# Patient Record
Sex: FEMALE | Race: WHITE | Employment: OTHER | URBAN - METROPOLITAN AREA
[De-identification: names, ages, dates, MRNs, and addresses within clinical notes are randomized per-mention and may not be internally consistent; named-entity substitution may affect disease eponyms.]

---

## 2017-10-04 PROBLEM — R30.0 DYSURIA: Status: ACTIVE | Noted: 2017-10-04

## 2017-10-04 PROBLEM — R39.12 WEAK URINARY STREAM: Status: ACTIVE | Noted: 2017-10-04

## 2017-10-04 PROBLEM — R35.0 FREQUENCY OF URINATION: Status: ACTIVE | Noted: 2017-10-04

## 2017-11-08 PROBLEM — R39.12 WEAK URINARY STREAM: Status: RESOLVED | Noted: 2017-10-04 | Resolved: 2017-11-08

## 2017-11-08 PROBLEM — R30.0 DYSURIA: Status: RESOLVED | Noted: 2017-10-04 | Resolved: 2017-11-08

## 2018-04-30 ENCOUNTER — APPOINTMENT (OUTPATIENT)
Dept: GENERAL RADIOLOGY | Age: 73
DRG: 872 | End: 2018-04-30
Payer: MEDICARE

## 2018-04-30 ENCOUNTER — HOSPITAL ENCOUNTER (INPATIENT)
Age: 73
LOS: 4 days | Discharge: NURSING, REHAB OR LONG-TERM CARE | DRG: 872 | End: 2018-05-04
Attending: EMERGENCY MEDICINE | Admitting: FAMILY MEDICINE
Payer: MEDICARE

## 2018-04-30 ENCOUNTER — APPOINTMENT (OUTPATIENT)
Dept: CT IMAGING | Age: 73
DRG: 872 | End: 2018-04-30
Payer: MEDICARE

## 2018-04-30 DIAGNOSIS — A41.9 SEVERE SEPSIS (HCC): Primary | ICD-10-CM

## 2018-04-30 DIAGNOSIS — E86.0 DEHYDRATION: ICD-10-CM

## 2018-04-30 DIAGNOSIS — J18.9 PNEUMONIA DUE TO ORGANISM: ICD-10-CM

## 2018-04-30 DIAGNOSIS — R65.20 SEVERE SEPSIS (HCC): Primary | ICD-10-CM

## 2018-04-30 LAB
-: ABNORMAL
ABSOLUTE EOS #: 0 K/UL (ref 0–0.4)
ABSOLUTE IMMATURE GRANULOCYTE: ABNORMAL K/UL (ref 0–0.3)
ABSOLUTE LYMPH #: 0.54 K/UL (ref 1–4.8)
ABSOLUTE MONO #: 0.91 K/UL (ref 0.2–0.8)
ACETAMINOPHEN LEVEL: <10 UG/ML (ref 10–30)
AMORPHOUS: ABNORMAL
AMPHETAMINE SCREEN URINE: NEGATIVE
ANION GAP SERPL CALCULATED.3IONS-SCNC: 14 MMOL/L (ref 9–17)
BACTERIA: ABNORMAL
BARBITURATE SCREEN URINE: NEGATIVE
BASOPHILS # BLD: 0 %
BASOPHILS ABSOLUTE: 0 K/UL (ref 0–0.2)
BENZODIAZEPINE SCREEN, URINE: POSITIVE
BILIRUBIN URINE: NEGATIVE
BNP INTERPRETATION: NORMAL
BUN BLDV-MCNC: 17 MG/DL (ref 8–23)
BUN/CREAT BLD: 9 (ref 9–20)
BUPRENORPHINE URINE: ABNORMAL
CALCIUM SERPL-MCNC: 9.5 MG/DL (ref 8.6–10.4)
CANNABINOID SCREEN URINE: NEGATIVE
CASTS UA: ABNORMAL /LPF
CHLORIDE BLD-SCNC: 102 MMOL/L (ref 98–107)
CO2: 25 MMOL/L (ref 20–31)
COCAINE METABOLITE, URINE: NEGATIVE
COLOR: YELLOW
COMMENT UA: ABNORMAL
CREAT SERPL-MCNC: 1.99 MG/DL (ref 0.5–0.9)
CRYSTALS, UA: ABNORMAL /HPF
DIFFERENTIAL TYPE: ABNORMAL
EKG ATRIAL RATE: 73 BPM
EKG P AXIS: 37 DEGREES
EKG P-R INTERVAL: 152 MS
EKG Q-T INTERVAL: 404 MS
EKG QRS DURATION: 82 MS
EKG QTC CALCULATION (BAZETT): 445 MS
EKG R AXIS: 41 DEGREES
EKG T AXIS: 13 DEGREES
EKG VENTRICULAR RATE: 73 BPM
EOSINOPHILS RELATIVE PERCENT: 0 % (ref 1–4)
EPITHELIAL CELLS UA: ABNORMAL /HPF (ref 0–5)
ETHANOL PERCENT: <0.01 %
ETHANOL: <10 MG/DL
GFR AFRICAN AMERICAN: 30 ML/MIN
GFR NON-AFRICAN AMERICAN: 25 ML/MIN
GFR SERPL CREATININE-BSD FRML MDRD: ABNORMAL ML/MIN/{1.73_M2}
GFR SERPL CREATININE-BSD FRML MDRD: ABNORMAL ML/MIN/{1.73_M2}
GLUCOSE BLD-MCNC: 137 MG/DL (ref 70–99)
GLUCOSE URINE: NEGATIVE
HCT VFR BLD CALC: 46.9 % (ref 36–46)
HEMOGLOBIN: 15.4 G/DL (ref 12–16)
IMMATURE GRANULOCYTES: ABNORMAL %
KETONES, URINE: NEGATIVE
LACTIC ACID, SEPSIS WHOLE BLOOD: ABNORMAL MMOL/L (ref 0.5–1.9)
LACTIC ACID, SEPSIS WHOLE BLOOD: NORMAL MMOL/L (ref 0.5–1.9)
LACTIC ACID, SEPSIS WHOLE BLOOD: NORMAL MMOL/L (ref 0.5–1.9)
LACTIC ACID, SEPSIS: 1.7 MMOL/L (ref 0.5–1.9)
LACTIC ACID, SEPSIS: 1.8 MMOL/L (ref 0.5–1.9)
LACTIC ACID, SEPSIS: 2 MMOL/L (ref 0.5–1.9)
LACTIC ACID: 4.1 MMOL/L (ref 0.5–2.2)
LEUKOCYTE ESTERASE, URINE: NEGATIVE
LYMPHOCYTES # BLD: 3 % (ref 24–44)
MCH RBC QN AUTO: 30.8 PG (ref 26–34)
MCHC RBC AUTO-ENTMCNC: 32.9 G/DL (ref 31–37)
MCV RBC AUTO: 93.5 FL (ref 80–100)
MDMA URINE: ABNORMAL
METHADONE SCREEN, URINE: NEGATIVE
METHAMPHETAMINE, URINE: ABNORMAL
MONOCYTES # BLD: 5 % (ref 1–7)
MUCUS: ABNORMAL
MYOGLOBIN: 675 NG/ML (ref 25–58)
NITRITE, URINE: NEGATIVE
NRBC AUTOMATED: ABNORMAL PER 100 WBC
OPIATES, URINE: NEGATIVE
OTHER OBSERVATIONS UA: ABNORMAL
OXYCODONE SCREEN URINE: POSITIVE
PDW BLD-RTO: 14.2 % (ref 11.5–14.5)
PH UA: 5.5 (ref 5–8)
PHENCYCLIDINE, URINE: NEGATIVE
PLATELET # BLD: 132 K/UL (ref 130–400)
PLATELET ESTIMATE: ABNORMAL
PMV BLD AUTO: 11.3 FL (ref 6–12)
POTASSIUM SERPL-SCNC: 4.6 MMOL/L (ref 3.7–5.3)
PRO-BNP: 263 PG/ML
PROPOXYPHENE, URINE: ABNORMAL
PROTEIN UA: NEGATIVE
RBC # BLD: 5.02 M/UL (ref 4–5.2)
RBC # BLD: ABNORMAL 10*6/UL
RBC UA: ABNORMAL /HPF (ref 0–2)
RENAL EPITHELIAL, UA: ABNORMAL /HPF
SALICYLATE LEVEL: <1 MG/DL (ref 3–10)
SEG NEUTROPHILS: 92 % (ref 36–66)
SEGMENTED NEUTROPHILS ABSOLUTE COUNT: 16.65 K/UL (ref 1.8–7.7)
SODIUM BLD-SCNC: 141 MMOL/L (ref 135–144)
SPECIFIC GRAVITY UA: 1.01 (ref 1–1.03)
TEST INFORMATION: ABNORMAL
TOTAL CK: 233 U/L (ref 26–192)
TOXIC TRICYCLIC SC,BLOOD: NEGATIVE
TRICHOMONAS: ABNORMAL
TRICYCLIC ANTIDEPRESSANTS, UR: ABNORMAL
TROPONIN INTERP: ABNORMAL
TROPONIN T: <0.03 NG/ML
TURBIDITY: ABNORMAL
URINE HGB: ABNORMAL
UROBILINOGEN, URINE: NORMAL
WBC # BLD: 18.1 K/UL (ref 3.5–11)
WBC # BLD: ABNORMAL 10*3/UL
WBC UA: ABNORMAL /HPF (ref 0–5)
YEAST: ABNORMAL

## 2018-04-30 PROCEDURE — 2580000003 HC RX 258: Performed by: PHYSICIAN ASSISTANT

## 2018-04-30 PROCEDURE — 73610 X-RAY EXAM OF ANKLE: CPT

## 2018-04-30 PROCEDURE — 73502 X-RAY EXAM HIP UNI 2-3 VIEWS: CPT

## 2018-04-30 PROCEDURE — 73630 X-RAY EXAM OF FOOT: CPT

## 2018-04-30 PROCEDURE — 83880 ASSAY OF NATRIURETIC PEPTIDE: CPT

## 2018-04-30 PROCEDURE — 70450 CT HEAD/BRAIN W/O DYE: CPT

## 2018-04-30 PROCEDURE — 6360000002 HC RX W HCPCS: Performed by: PHYSICIAN ASSISTANT

## 2018-04-30 PROCEDURE — 93005 ELECTROCARDIOGRAM TRACING: CPT

## 2018-04-30 PROCEDURE — 71046 X-RAY EXAM CHEST 2 VIEWS: CPT

## 2018-04-30 PROCEDURE — G0480 DRUG TEST DEF 1-7 CLASSES: HCPCS

## 2018-04-30 PROCEDURE — 87040 BLOOD CULTURE FOR BACTERIA: CPT

## 2018-04-30 PROCEDURE — 36415 COLL VENOUS BLD VENIPUNCTURE: CPT

## 2018-04-30 PROCEDURE — 72125 CT NECK SPINE W/O DYE: CPT

## 2018-04-30 PROCEDURE — 2000000000 HC ICU R&B

## 2018-04-30 PROCEDURE — 96374 THER/PROPH/DIAG INJ IV PUSH: CPT

## 2018-04-30 PROCEDURE — 83605 ASSAY OF LACTIC ACID: CPT

## 2018-04-30 PROCEDURE — 84484 ASSAY OF TROPONIN QUANT: CPT

## 2018-04-30 PROCEDURE — 99285 EMERGENCY DEPT VISIT HI MDM: CPT

## 2018-04-30 PROCEDURE — 96361 HYDRATE IV INFUSION ADD-ON: CPT

## 2018-04-30 PROCEDURE — 87205 SMEAR GRAM STAIN: CPT

## 2018-04-30 PROCEDURE — 73562 X-RAY EXAM OF KNEE 3: CPT

## 2018-04-30 PROCEDURE — 80048 BASIC METABOLIC PNL TOTAL CA: CPT

## 2018-04-30 PROCEDURE — 85025 COMPLETE CBC W/AUTO DIFF WBC: CPT

## 2018-04-30 PROCEDURE — 2580000003 HC RX 258: Performed by: INTERNAL MEDICINE

## 2018-04-30 PROCEDURE — 81001 URINALYSIS AUTO W/SCOPE: CPT

## 2018-04-30 PROCEDURE — 83874 ASSAY OF MYOGLOBIN: CPT

## 2018-04-30 PROCEDURE — 80307 DRUG TEST PRSMV CHEM ANLYZR: CPT

## 2018-04-30 PROCEDURE — 82550 ASSAY OF CK (CPK): CPT

## 2018-04-30 RX ORDER — 0.9 % SODIUM CHLORIDE 0.9 %
1100 INTRAVENOUS SOLUTION INTRAVENOUS ONCE
Status: COMPLETED | OUTPATIENT
Start: 2018-04-30 | End: 2018-04-30

## 2018-04-30 RX ORDER — SODIUM CHLORIDE 0.9 % (FLUSH) 0.9 %
10 SYRINGE (ML) INJECTION EVERY 12 HOURS SCHEDULED
Status: DISCONTINUED | OUTPATIENT
Start: 2018-04-30 | End: 2018-05-04 | Stop reason: HOSPADM

## 2018-04-30 RX ORDER — SODIUM CHLORIDE 9 MG/ML
INJECTION, SOLUTION INTRAVENOUS CONTINUOUS
Status: DISCONTINUED | OUTPATIENT
Start: 2018-04-30 | End: 2018-05-04

## 2018-04-30 RX ORDER — PANTOPRAZOLE SODIUM 40 MG/1
40 TABLET, DELAYED RELEASE ORAL DAILY
Refills: 0 | Status: ON HOLD | COMMUNITY
Start: 2018-03-10 | End: 2020-02-25 | Stop reason: HOSPADM

## 2018-04-30 RX ORDER — ONDANSETRON 2 MG/ML
4 INJECTION INTRAMUSCULAR; INTRAVENOUS EVERY 6 HOURS PRN
Status: DISCONTINUED | OUTPATIENT
Start: 2018-04-30 | End: 2018-05-04 | Stop reason: HOSPADM

## 2018-04-30 RX ORDER — SIMVASTATIN 40 MG
40 TABLET ORAL NIGHTLY
Status: DISCONTINUED | OUTPATIENT
Start: 2018-04-30 | End: 2018-04-30

## 2018-04-30 RX ORDER — BENZTROPINE MESYLATE 2 MG/1
2 TABLET ORAL DAILY
Status: ON HOLD | COMMUNITY
End: 2018-05-04 | Stop reason: HOSPADM

## 2018-04-30 RX ORDER — SODIUM CHLORIDE 0.9 % (FLUSH) 0.9 %
10 SYRINGE (ML) INJECTION EVERY 12 HOURS SCHEDULED
Status: DISCONTINUED | OUTPATIENT
Start: 2018-04-30 | End: 2018-04-30 | Stop reason: SDUPTHER

## 2018-04-30 RX ORDER — SODIUM CHLORIDE 0.9 % (FLUSH) 0.9 %
10 SYRINGE (ML) INJECTION PRN
Status: DISCONTINUED | OUTPATIENT
Start: 2018-04-30 | End: 2018-05-04 | Stop reason: HOSPADM

## 2018-04-30 RX ORDER — SODIUM CHLORIDE 0.9 % (FLUSH) 0.9 %
10 SYRINGE (ML) INJECTION PRN
Status: DISCONTINUED | OUTPATIENT
Start: 2018-04-30 | End: 2018-04-30 | Stop reason: SDUPTHER

## 2018-04-30 RX ORDER — ALPRAZOLAM 1 MG/1
1 TABLET ORAL 3 TIMES DAILY PRN
Status: ON HOLD | COMMUNITY
End: 2018-05-04 | Stop reason: HOSPADM

## 2018-04-30 RX ORDER — BISACODYL 10 MG
10 SUPPOSITORY, RECTAL RECTAL DAILY PRN
Status: DISCONTINUED | OUTPATIENT
Start: 2018-04-30 | End: 2018-05-04 | Stop reason: HOSPADM

## 2018-04-30 RX ORDER — ONDANSETRON 2 MG/ML
4 INJECTION INTRAMUSCULAR; INTRAVENOUS EVERY 6 HOURS PRN
Status: DISCONTINUED | OUTPATIENT
Start: 2018-04-30 | End: 2018-04-30 | Stop reason: SDUPTHER

## 2018-04-30 RX ORDER — CITALOPRAM 20 MG/1
20 TABLET ORAL DAILY
Status: DISCONTINUED | OUTPATIENT
Start: 2018-04-30 | End: 2018-05-04 | Stop reason: HOSPADM

## 2018-04-30 RX ORDER — LEVOFLOXACIN 5 MG/ML
500 INJECTION, SOLUTION INTRAVENOUS EVERY 24 HOURS
Status: DISCONTINUED | OUTPATIENT
Start: 2018-05-01 | End: 2018-05-03

## 2018-04-30 RX ORDER — CALCIUM CARBONATE 200(500)MG
500 TABLET,CHEWABLE ORAL 3 TIMES DAILY PRN
Status: DISCONTINUED | OUTPATIENT
Start: 2018-04-30 | End: 2018-05-04 | Stop reason: HOSPADM

## 2018-04-30 RX ORDER — NICOTINE 21 MG/24HR
1 PATCH, TRANSDERMAL 24 HOURS TRANSDERMAL DAILY PRN
Status: DISCONTINUED | OUTPATIENT
Start: 2018-04-30 | End: 2018-05-04 | Stop reason: HOSPADM

## 2018-04-30 RX ORDER — 0.9 % SODIUM CHLORIDE 0.9 %
1000 INTRAVENOUS SOLUTION INTRAVENOUS ONCE
Status: COMPLETED | OUTPATIENT
Start: 2018-04-30 | End: 2018-04-30

## 2018-04-30 RX ORDER — QUETIAPINE FUMARATE 100 MG/1
100 TABLET, FILM COATED ORAL 2 TIMES DAILY
Status: ON HOLD | COMMUNITY
End: 2018-05-04 | Stop reason: HOSPADM

## 2018-04-30 RX ORDER — SIMVASTATIN 40 MG
40 TABLET ORAL DAILY
Status: DISCONTINUED | OUTPATIENT
Start: 2018-05-01 | End: 2018-05-04 | Stop reason: HOSPADM

## 2018-04-30 RX ORDER — LEVOFLOXACIN 5 MG/ML
750 INJECTION, SOLUTION INTRAVENOUS ONCE
Status: COMPLETED | OUTPATIENT
Start: 2018-04-30 | End: 2018-04-30

## 2018-04-30 RX ORDER — SODIUM CHLORIDE 9 MG/ML
INJECTION, SOLUTION INTRAVENOUS CONTINUOUS
Status: DISCONTINUED | OUTPATIENT
Start: 2018-04-30 | End: 2018-04-30

## 2018-04-30 RX ORDER — MAGNESIUM SULFATE 1 G/100ML
1 INJECTION INTRAVENOUS PRN
Status: DISCONTINUED | OUTPATIENT
Start: 2018-04-30 | End: 2018-04-30 | Stop reason: SDUPTHER

## 2018-04-30 RX ORDER — CEFTRIAXONE 1 G/1
1 INJECTION, POWDER, FOR SOLUTION INTRAMUSCULAR; INTRAVENOUS EVERY 24 HOURS
Status: DISCONTINUED | OUTPATIENT
Start: 2018-05-01 | End: 2018-04-30 | Stop reason: SDUPTHER

## 2018-04-30 RX ORDER — TRAZODONE HYDROCHLORIDE 50 MG/1
75 TABLET ORAL NIGHTLY
Status: DISCONTINUED | OUTPATIENT
Start: 2018-04-30 | End: 2018-05-04 | Stop reason: HOSPADM

## 2018-04-30 RX ORDER — LEVOFLOXACIN 5 MG/ML
500 INJECTION, SOLUTION INTRAVENOUS ONCE
Status: DISCONTINUED | OUTPATIENT
Start: 2018-04-30 | End: 2018-04-30

## 2018-04-30 RX ORDER — ONDANSETRON 4 MG/1
4 TABLET, ORALLY DISINTEGRATING ORAL EVERY 6 HOURS PRN
Status: DISCONTINUED | OUTPATIENT
Start: 2018-04-30 | End: 2018-05-04 | Stop reason: HOSPADM

## 2018-04-30 RX ADMIN — SODIUM CHLORIDE 1000 ML: 9 INJECTION, SOLUTION INTRAVENOUS at 17:30

## 2018-04-30 RX ADMIN — LEVOFLOXACIN 750 MG: 5 INJECTION, SOLUTION INTRAVENOUS at 17:52

## 2018-04-30 RX ADMIN — Medication 10 ML: at 22:31

## 2018-04-30 RX ADMIN — CEFTRIAXONE SODIUM 1 G: 1 INJECTION, POWDER, FOR SOLUTION INTRAMUSCULAR; INTRAVENOUS at 17:47

## 2018-04-30 RX ADMIN — SODIUM CHLORIDE 1000 ML: 9 INJECTION, SOLUTION INTRAVENOUS at 15:38

## 2018-04-30 RX ADMIN — SODIUM CHLORIDE: 9 INJECTION, SOLUTION INTRAVENOUS at 22:26

## 2018-04-30 ASSESSMENT — PAIN SCALES - GENERAL
PAINLEVEL_OUTOF10: 0

## 2018-05-01 ENCOUNTER — APPOINTMENT (OUTPATIENT)
Dept: GENERAL RADIOLOGY | Age: 73
DRG: 872 | End: 2018-05-01
Payer: MEDICARE

## 2018-05-01 PROBLEM — R33.9 URINE RETENTION: Status: ACTIVE | Noted: 2018-05-01
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
## 2018-05-01 PROBLEM — N17.9 ACUTE KIDNEY INJURY (HCC): Status: ACTIVE | Noted: 2018-05-01

## 2018-05-01 LAB
ANION GAP SERPL CALCULATED.3IONS-SCNC: 9 MMOL/L (ref 9–17)
BUN BLDV-MCNC: 20 MG/DL (ref 8–23)
BUN/CREAT BLD: 20 (ref 9–20)
CALCIUM SERPL-MCNC: 8.7 MG/DL (ref 8.6–10.4)
CHLORIDE BLD-SCNC: 109 MMOL/L (ref 98–107)
CO2: 25 MMOL/L (ref 20–31)
CREAT SERPL-MCNC: 0.99 MG/DL (ref 0.5–0.9)
GFR AFRICAN AMERICAN: >60 ML/MIN
GFR NON-AFRICAN AMERICAN: 55 ML/MIN
GFR SERPL CREATININE-BSD FRML MDRD: ABNORMAL ML/MIN/{1.73_M2}
GFR SERPL CREATININE-BSD FRML MDRD: ABNORMAL ML/MIN/{1.73_M2}
GLUCOSE BLD-MCNC: 92 MG/DL (ref 70–99)
HCT VFR BLD CALC: 41.1 % (ref 36–46)
HEMOGLOBIN: 13.5 G/DL (ref 12–16)
INR BLD: 1.1
MCH RBC QN AUTO: 30.5 PG (ref 26–34)
MCHC RBC AUTO-ENTMCNC: 32.8 G/DL (ref 31–37)
MCV RBC AUTO: 92.8 FL (ref 80–100)
NRBC AUTOMATED: ABNORMAL PER 100 WBC
PDW BLD-RTO: 14.2 % (ref 11.5–14.5)
PLATELET # BLD: 121 K/UL (ref 130–400)
PMV BLD AUTO: 11.4 FL (ref 6–12)
POTASSIUM SERPL-SCNC: 4 MMOL/L (ref 3.7–5.3)
PROTHROMBIN TIME: 11.4 SEC (ref 9.7–11.6)
RBC # BLD: 4.43 M/UL (ref 4–5.2)
SODIUM BLD-SCNC: 143 MMOL/L (ref 135–144)
WBC # BLD: 13 K/UL (ref 3.5–11)

## 2018-05-01 PROCEDURE — 1200000000 HC SEMI PRIVATE

## 2018-05-01 PROCEDURE — 6370000000 HC RX 637 (ALT 250 FOR IP): Performed by: INTERNAL MEDICINE

## 2018-05-01 PROCEDURE — 97530 THERAPEUTIC ACTIVITIES: CPT

## 2018-05-01 PROCEDURE — 6370000000 HC RX 637 (ALT 250 FOR IP): Performed by: NURSE PRACTITIONER

## 2018-05-01 PROCEDURE — 6360000002 HC RX W HCPCS: Performed by: INTERNAL MEDICINE

## 2018-05-01 PROCEDURE — 85610 PROTHROMBIN TIME: CPT

## 2018-05-01 PROCEDURE — 2580000003 HC RX 258: Performed by: PHYSICIAN ASSISTANT

## 2018-05-01 PROCEDURE — 2580000003 HC RX 258: Performed by: INTERNAL MEDICINE

## 2018-05-01 PROCEDURE — 51798 US URINE CAPACITY MEASURE: CPT

## 2018-05-01 PROCEDURE — 80048 BASIC METABOLIC PNL TOTAL CA: CPT

## 2018-05-01 PROCEDURE — 85027 COMPLETE CBC AUTOMATED: CPT

## 2018-05-01 PROCEDURE — 87086 URINE CULTURE/COLONY COUNT: CPT

## 2018-05-01 PROCEDURE — 51702 INSERT TEMP BLADDER CATH: CPT

## 2018-05-01 PROCEDURE — 6360000002 HC RX W HCPCS: Performed by: NURSE PRACTITIONER

## 2018-05-01 PROCEDURE — 72100 X-RAY EXAM L-S SPINE 2/3 VWS: CPT

## 2018-05-01 PROCEDURE — 2580000003 HC RX 258: Performed by: NURSE PRACTITIONER

## 2018-05-01 PROCEDURE — 36415 COLL VENOUS BLD VENIPUNCTURE: CPT

## 2018-05-01 PROCEDURE — 97116 GAIT TRAINING THERAPY: CPT

## 2018-05-01 PROCEDURE — 97162 PT EVAL MOD COMPLEX 30 MIN: CPT

## 2018-05-01 PROCEDURE — 99222 1ST HOSP IP/OBS MODERATE 55: CPT | Performed by: INTERNAL MEDICINE

## 2018-05-01 PROCEDURE — G8979 MOBILITY GOAL STATUS: HCPCS

## 2018-05-01 PROCEDURE — G8978 MOBILITY CURRENT STATUS: HCPCS

## 2018-05-01 RX ORDER — QUETIAPINE FUMARATE 100 MG/1
100 TABLET, FILM COATED ORAL 2 TIMES DAILY
Status: DISCONTINUED | OUTPATIENT
Start: 2018-05-01 | End: 2018-05-04 | Stop reason: HOSPADM

## 2018-05-01 RX ORDER — BENZTROPINE MESYLATE 1 MG/1
2 TABLET ORAL DAILY
Status: DISCONTINUED | OUTPATIENT
Start: 2018-05-01 | End: 2018-05-04 | Stop reason: HOSPADM

## 2018-05-01 RX ORDER — METOPROLOL SUCCINATE 25 MG/1
25 TABLET, EXTENDED RELEASE ORAL DAILY
Status: DISCONTINUED | OUTPATIENT
Start: 2018-05-01 | End: 2018-05-04 | Stop reason: HOSPADM

## 2018-05-01 RX ORDER — ALPRAZOLAM 0.5 MG/1
0.5 TABLET ORAL 3 TIMES DAILY PRN
Status: DISCONTINUED | OUTPATIENT
Start: 2018-05-01 | End: 2018-05-04 | Stop reason: HOSPADM

## 2018-05-01 RX ORDER — OXYCODONE HYDROCHLORIDE AND ACETAMINOPHEN 5; 325 MG/1; MG/1
1 TABLET ORAL EVERY 4 HOURS PRN
Status: DISCONTINUED | OUTPATIENT
Start: 2018-05-01 | End: 2018-05-02

## 2018-05-01 RX ORDER — FAMOTIDINE 20 MG/1
20 TABLET, FILM COATED ORAL DAILY
Status: DISCONTINUED | OUTPATIENT
Start: 2018-05-01 | End: 2018-05-02

## 2018-05-01 RX ORDER — GABAPENTIN 300 MG/1
300 CAPSULE ORAL 3 TIMES DAILY
Status: DISCONTINUED | OUTPATIENT
Start: 2018-05-01 | End: 2018-05-04 | Stop reason: HOSPADM

## 2018-05-01 RX ORDER — CHLORDIAZEPOXIDE HYDROCHLORIDE 5 MG/1
5 CAPSULE, GELATIN COATED ORAL 3 TIMES DAILY
Status: DISCONTINUED | OUTPATIENT
Start: 2018-05-01 | End: 2018-05-04 | Stop reason: HOSPADM

## 2018-05-01 RX ORDER — MELOXICAM 7.5 MG/1
7.5 TABLET ORAL DAILY
Status: DISCONTINUED | OUTPATIENT
Start: 2018-05-01 | End: 2018-05-04 | Stop reason: HOSPADM

## 2018-05-01 RX ADMIN — CHLORDIAZEPOXIDE HYDROCHLORIDE 5 MG: 5 CAPSULE, GELATIN COATED ORAL at 20:48

## 2018-05-01 RX ADMIN — ONDANSETRON 4 MG: 2 INJECTION INTRAMUSCULAR; INTRAVENOUS at 06:42

## 2018-05-01 RX ADMIN — Medication 10 ML: at 08:03

## 2018-05-01 RX ADMIN — MELOXICAM 7.5 MG: 7.5 TABLET ORAL at 11:46

## 2018-05-01 RX ADMIN — FAMOTIDINE 20 MG: 20 TABLET ORAL at 11:51

## 2018-05-01 RX ADMIN — BENZTROPINE MESYLATE 2 MG: 1 TABLET ORAL at 11:46

## 2018-05-01 RX ADMIN — QUETIAPINE FUMARATE 100 MG: 100 TABLET ORAL at 11:46

## 2018-05-01 RX ADMIN — ANTACID TABLETS 500 MG: 500 TABLET, CHEWABLE ORAL at 18:31

## 2018-05-01 RX ADMIN — CHLORDIAZEPOXIDE HYDROCHLORIDE 5 MG: 5 CAPSULE, GELATIN COATED ORAL at 13:55

## 2018-05-01 RX ADMIN — GABAPENTIN 300 MG: 300 CAPSULE ORAL at 20:44

## 2018-05-01 RX ADMIN — SIMVASTATIN 40 MG: 40 TABLET, FILM COATED ORAL at 08:03

## 2018-05-01 RX ADMIN — CEFTRIAXONE SODIUM 1 G: 1 INJECTION, POWDER, FOR SOLUTION INTRAMUSCULAR; INTRAVENOUS at 18:32

## 2018-05-01 RX ADMIN — ALPRAZOLAM 0.5 MG: 0.5 TABLET ORAL at 07:26

## 2018-05-01 RX ADMIN — SODIUM CHLORIDE: 9 INJECTION, SOLUTION INTRAVENOUS at 11:51

## 2018-05-01 RX ADMIN — ANTACID TABLETS 500 MG: 500 TABLET, CHEWABLE ORAL at 06:39

## 2018-05-01 RX ADMIN — QUETIAPINE FUMARATE 100 MG: 100 TABLET ORAL at 20:44

## 2018-05-01 RX ADMIN — TRAZODONE HYDROCHLORIDE 75 MG: 50 TABLET ORAL at 20:44

## 2018-05-01 RX ADMIN — OXYCODONE HYDROCHLORIDE AND ACETAMINOPHEN 1 TABLET: 5; 325 TABLET ORAL at 11:46

## 2018-05-01 RX ADMIN — METOPROLOL SUCCINATE 25 MG: 25 TABLET, FILM COATED, EXTENDED RELEASE ORAL at 11:47

## 2018-05-01 RX ADMIN — CITALOPRAM HYDROBROMIDE 20 MG: 20 TABLET ORAL at 08:03

## 2018-05-01 RX ADMIN — GABAPENTIN 300 MG: 300 CAPSULE ORAL at 13:55

## 2018-05-01 RX ADMIN — ANTACID TABLETS 500 MG: 500 TABLET, CHEWABLE ORAL at 00:11

## 2018-05-01 RX ADMIN — LEVOFLOXACIN 500 MG: 5 INJECTION, SOLUTION INTRAVENOUS at 20:21

## 2018-05-01 RX ADMIN — SODIUM CHLORIDE: 9 INJECTION, SOLUTION INTRAVENOUS at 05:03

## 2018-05-01 RX ADMIN — TRAZODONE HYDROCHLORIDE 75 MG: 50 TABLET ORAL at 00:11

## 2018-05-01 RX ADMIN — OXYCODONE HYDROCHLORIDE AND ACETAMINOPHEN 1 TABLET: 5; 325 TABLET ORAL at 15:45

## 2018-05-01 RX ADMIN — ALPRAZOLAM 0.5 MG: 0.5 TABLET ORAL at 15:45

## 2018-05-01 ASSESSMENT — PAIN SCALES - GENERAL
PAINLEVEL_OUTOF10: 6
PAINLEVEL_OUTOF10: 0
PAINLEVEL_OUTOF10: 8
PAINLEVEL_OUTOF10: 0
PAINLEVEL_OUTOF10: 8
PAINLEVEL_OUTOF10: 0

## 2018-05-01 ASSESSMENT — PAIN DESCRIPTION - LOCATION
LOCATION: LEG;FOOT;HIP
LOCATION: KNEE

## 2018-05-01 ASSESSMENT — PAIN DESCRIPTION - DESCRIPTORS: DESCRIPTORS: THROBBING

## 2018-05-01 ASSESSMENT — PAIN DESCRIPTION - ORIENTATION
ORIENTATION: LEFT

## 2018-05-01 ASSESSMENT — PAIN DESCRIPTION - PAIN TYPE: TYPE: ACUTE PAIN

## 2018-05-02 ENCOUNTER — APPOINTMENT (OUTPATIENT)
Dept: GENERAL RADIOLOGY | Age: 73
DRG: 872 | End: 2018-05-02
Payer: MEDICARE

## 2018-05-02 LAB
ANION GAP SERPL CALCULATED.3IONS-SCNC: 11 MMOL/L (ref 9–17)
BUN BLDV-MCNC: 12 MG/DL (ref 8–23)
BUN/CREAT BLD: 14 (ref 9–20)
CALCIUM SERPL-MCNC: 8.2 MG/DL (ref 8.6–10.4)
CHLORIDE BLD-SCNC: 108 MMOL/L (ref 98–107)
CO2: 24 MMOL/L (ref 20–31)
CREAT SERPL-MCNC: 0.88 MG/DL (ref 0.5–0.9)
CULTURE: ABNORMAL
CULTURE: NO GROWTH
CULTURE: NORMAL
GFR AFRICAN AMERICAN: >60 ML/MIN
GFR NON-AFRICAN AMERICAN: >60 ML/MIN
GFR SERPL CREATININE-BSD FRML MDRD: ABNORMAL ML/MIN/{1.73_M2}
GFR SERPL CREATININE-BSD FRML MDRD: ABNORMAL ML/MIN/{1.73_M2}
GLUCOSE BLD-MCNC: 86 MG/DL (ref 70–99)
HCT VFR BLD CALC: 39.5 % (ref 36–46)
HEMOGLOBIN: 12.7 G/DL (ref 12–16)
Lab: ABNORMAL
Lab: NORMAL
MCH RBC QN AUTO: 30.2 PG (ref 26–34)
MCHC RBC AUTO-ENTMCNC: 32.2 G/DL (ref 31–37)
MCV RBC AUTO: 93.7 FL (ref 80–100)
NRBC AUTOMATED: ABNORMAL PER 100 WBC
PDW BLD-RTO: 14.3 % (ref 11.5–14.5)
PLATELET # BLD: 116 K/UL (ref 130–400)
PMV BLD AUTO: 11.6 FL (ref 6–12)
POTASSIUM SERPL-SCNC: 4 MMOL/L (ref 3.7–5.3)
RBC # BLD: 4.22 M/UL (ref 4–5.2)
SODIUM BLD-SCNC: 143 MMOL/L (ref 135–144)
SPECIMEN DESCRIPTION: ABNORMAL
SPECIMEN DESCRIPTION: ABNORMAL
SPECIMEN DESCRIPTION: NORMAL
SPECIMEN DESCRIPTION: NORMAL
STATUS: ABNORMAL
STATUS: NORMAL
WBC # BLD: 8.4 K/UL (ref 3.5–11)

## 2018-05-02 PROCEDURE — 85027 COMPLETE CBC AUTOMATED: CPT

## 2018-05-02 PROCEDURE — 97530 THERAPEUTIC ACTIVITIES: CPT

## 2018-05-02 PROCEDURE — 36415 COLL VENOUS BLD VENIPUNCTURE: CPT

## 2018-05-02 PROCEDURE — 6360000002 HC RX W HCPCS: Performed by: INTERNAL MEDICINE

## 2018-05-02 PROCEDURE — 6370000000 HC RX 637 (ALT 250 FOR IP): Performed by: NURSE PRACTITIONER

## 2018-05-02 PROCEDURE — 71046 X-RAY EXAM CHEST 2 VIEWS: CPT

## 2018-05-02 PROCEDURE — 1200000000 HC SEMI PRIVATE

## 2018-05-02 PROCEDURE — 97110 THERAPEUTIC EXERCISES: CPT

## 2018-05-02 PROCEDURE — 6370000000 HC RX 637 (ALT 250 FOR IP): Performed by: INTERNAL MEDICINE

## 2018-05-02 PROCEDURE — 2580000003 HC RX 258: Performed by: NURSE PRACTITIONER

## 2018-05-02 PROCEDURE — 6370000000 HC RX 637 (ALT 250 FOR IP): Performed by: FAMILY MEDICINE

## 2018-05-02 PROCEDURE — 97116 GAIT TRAINING THERAPY: CPT

## 2018-05-02 PROCEDURE — 80048 BASIC METABOLIC PNL TOTAL CA: CPT

## 2018-05-02 PROCEDURE — 6360000002 HC RX W HCPCS: Performed by: NURSE PRACTITIONER

## 2018-05-02 PROCEDURE — 2580000003 HC RX 258: Performed by: INTERNAL MEDICINE

## 2018-05-02 PROCEDURE — 2580000003 HC RX 258: Performed by: PHYSICIAN ASSISTANT

## 2018-05-02 RX ORDER — OXYCODONE HYDROCHLORIDE AND ACETAMINOPHEN 5; 325 MG/1; MG/1
1 TABLET ORAL EVERY 4 HOURS PRN
Status: DISCONTINUED | OUTPATIENT
Start: 2018-05-02 | End: 2018-05-04 | Stop reason: HOSPADM

## 2018-05-02 RX ORDER — FAMOTIDINE 20 MG/1
40 TABLET, FILM COATED ORAL DAILY
Status: DISCONTINUED | OUTPATIENT
Start: 2018-05-03 | End: 2018-05-04 | Stop reason: HOSPADM

## 2018-05-02 RX ORDER — ACETAMINOPHEN 325 MG/1
650 TABLET ORAL EVERY 4 HOURS PRN
Status: DISCONTINUED | OUTPATIENT
Start: 2018-05-02 | End: 2018-05-04 | Stop reason: HOSPADM

## 2018-05-02 RX ADMIN — OXYCODONE HYDROCHLORIDE AND ACETAMINOPHEN 1 TABLET: 5; 325 TABLET ORAL at 19:00

## 2018-05-02 RX ADMIN — SODIUM CHLORIDE: 9 INJECTION, SOLUTION INTRAVENOUS at 02:13

## 2018-05-02 RX ADMIN — CHLORDIAZEPOXIDE HYDROCHLORIDE 5 MG: 5 CAPSULE, GELATIN COATED ORAL at 14:10

## 2018-05-02 RX ADMIN — OXYCODONE HYDROCHLORIDE AND ACETAMINOPHEN 1 TABLET: 5; 325 TABLET ORAL at 07:16

## 2018-05-02 RX ADMIN — Medication 10 ML: at 22:44

## 2018-05-02 RX ADMIN — MELOXICAM 7.5 MG: 7.5 TABLET ORAL at 09:11

## 2018-05-02 RX ADMIN — SIMVASTATIN 40 MG: 40 TABLET, FILM COATED ORAL at 09:11

## 2018-05-02 RX ADMIN — METOPROLOL SUCCINATE 25 MG: 25 TABLET, FILM COATED, EXTENDED RELEASE ORAL at 09:11

## 2018-05-02 RX ADMIN — SODIUM CHLORIDE: 9 INJECTION, SOLUTION INTRAVENOUS at 14:32

## 2018-05-02 RX ADMIN — ALPRAZOLAM 0.5 MG: 0.5 TABLET ORAL at 14:27

## 2018-05-02 RX ADMIN — CHLORDIAZEPOXIDE HYDROCHLORIDE 5 MG: 5 CAPSULE, GELATIN COATED ORAL at 22:36

## 2018-05-02 RX ADMIN — CHLORDIAZEPOXIDE HYDROCHLORIDE 5 MG: 5 CAPSULE, GELATIN COATED ORAL at 09:12

## 2018-05-02 RX ADMIN — LEVOFLOXACIN 500 MG: 5 INJECTION, SOLUTION INTRAVENOUS at 17:24

## 2018-05-02 RX ADMIN — CEFTRIAXONE SODIUM 1 G: 1 INJECTION, POWDER, FOR SOLUTION INTRAMUSCULAR; INTRAVENOUS at 18:04

## 2018-05-02 RX ADMIN — ANTACID TABLETS 500 MG: 500 TABLET, CHEWABLE ORAL at 18:04

## 2018-05-02 RX ADMIN — QUETIAPINE FUMARATE 100 MG: 100 TABLET ORAL at 09:11

## 2018-05-02 RX ADMIN — OXYCODONE HYDROCHLORIDE AND ACETAMINOPHEN 1 TABLET: 5; 325 TABLET ORAL at 02:26

## 2018-05-02 RX ADMIN — GABAPENTIN 300 MG: 300 CAPSULE ORAL at 22:37

## 2018-05-02 RX ADMIN — GABAPENTIN 300 MG: 300 CAPSULE ORAL at 09:11

## 2018-05-02 RX ADMIN — FAMOTIDINE 20 MG: 20 TABLET ORAL at 09:11

## 2018-05-02 RX ADMIN — ALPRAZOLAM 0.5 MG: 0.5 TABLET ORAL at 20:08

## 2018-05-02 RX ADMIN — ACETAMINOPHEN 650 MG: 325 TABLET ORAL at 18:04

## 2018-05-02 RX ADMIN — GABAPENTIN 300 MG: 300 CAPSULE ORAL at 14:10

## 2018-05-02 RX ADMIN — BENZTROPINE MESYLATE 2 MG: 1 TABLET ORAL at 09:11

## 2018-05-02 RX ADMIN — QUETIAPINE FUMARATE 100 MG: 100 TABLET ORAL at 22:38

## 2018-05-02 RX ADMIN — TRAZODONE HYDROCHLORIDE 75 MG: 50 TABLET ORAL at 22:38

## 2018-05-02 RX ADMIN — CITALOPRAM HYDROBROMIDE 20 MG: 20 TABLET ORAL at 09:11

## 2018-05-02 ASSESSMENT — PAIN DESCRIPTION - FREQUENCY
FREQUENCY: CONTINUOUS
FREQUENCY: INTERMITTENT

## 2018-05-02 ASSESSMENT — PAIN DESCRIPTION - ONSET
ONSET: ON-GOING
ONSET: PROGRESSIVE

## 2018-05-02 ASSESSMENT — PAIN DESCRIPTION - LOCATION
LOCATION: GENERALIZED
LOCATION: LEG;HIP;FOOT

## 2018-05-02 ASSESSMENT — PAIN SCALES - GENERAL
PAINLEVEL_OUTOF10: 10
PAINLEVEL_OUTOF10: 9
PAINLEVEL_OUTOF10: 6
PAINLEVEL_OUTOF10: 0
PAINLEVEL_OUTOF10: 5
PAINLEVEL_OUTOF10: 3

## 2018-05-02 ASSESSMENT — PAIN DESCRIPTION - PAIN TYPE
TYPE: ACUTE PAIN
TYPE: ACUTE PAIN

## 2018-05-02 ASSESSMENT — PAIN DESCRIPTION - DESCRIPTORS: DESCRIPTORS: PATIENT UNABLE TO DESCRIBE

## 2018-05-02 ASSESSMENT — PAIN DESCRIPTION - ORIENTATION: ORIENTATION: LEFT;RIGHT

## 2018-05-03 LAB
HCT VFR BLD CALC: 39 % (ref 36–46)
HEMOGLOBIN: 13 G/DL (ref 12–16)
MCH RBC QN AUTO: 30.9 PG (ref 26–34)
MCHC RBC AUTO-ENTMCNC: 33.2 G/DL (ref 31–37)
MCV RBC AUTO: 93 FL (ref 80–100)
NRBC AUTOMATED: ABNORMAL PER 100 WBC
PDW BLD-RTO: 14.2 % (ref 11.5–14.5)
PLATELET # BLD: 122 K/UL (ref 130–400)
PMV BLD AUTO: 10.7 FL (ref 6–12)
RBC # BLD: 4.2 M/UL (ref 4–5.2)
WBC # BLD: 6 K/UL (ref 3.5–11)

## 2018-05-03 PROCEDURE — 6370000000 HC RX 637 (ALT 250 FOR IP): Performed by: FAMILY MEDICINE

## 2018-05-03 PROCEDURE — 2580000003 HC RX 258: Performed by: PHYSICIAN ASSISTANT

## 2018-05-03 PROCEDURE — 6370000000 HC RX 637 (ALT 250 FOR IP): Performed by: INTERNAL MEDICINE

## 2018-05-03 PROCEDURE — 97110 THERAPEUTIC EXERCISES: CPT

## 2018-05-03 PROCEDURE — 85027 COMPLETE CBC AUTOMATED: CPT

## 2018-05-03 PROCEDURE — 97116 GAIT TRAINING THERAPY: CPT

## 2018-05-03 PROCEDURE — 2580000003 HC RX 258: Performed by: INTERNAL MEDICINE

## 2018-05-03 PROCEDURE — 1200000000 HC SEMI PRIVATE

## 2018-05-03 PROCEDURE — 99221 1ST HOSP IP/OBS SF/LOW 40: CPT | Performed by: INTERNAL MEDICINE

## 2018-05-03 PROCEDURE — 6370000000 HC RX 637 (ALT 250 FOR IP): Performed by: NURSE PRACTITIONER

## 2018-05-03 PROCEDURE — 36415 COLL VENOUS BLD VENIPUNCTURE: CPT

## 2018-05-03 RX ADMIN — SIMVASTATIN 40 MG: 40 TABLET, FILM COATED ORAL at 09:53

## 2018-05-03 RX ADMIN — ANTACID TABLETS 500 MG: 500 TABLET, CHEWABLE ORAL at 21:24

## 2018-05-03 RX ADMIN — OXYCODONE HYDROCHLORIDE AND ACETAMINOPHEN 1 TABLET: 5; 325 TABLET ORAL at 05:24

## 2018-05-03 RX ADMIN — GABAPENTIN 300 MG: 300 CAPSULE ORAL at 14:00

## 2018-05-03 RX ADMIN — GABAPENTIN 300 MG: 300 CAPSULE ORAL at 09:54

## 2018-05-03 RX ADMIN — SODIUM CHLORIDE: 9 INJECTION, SOLUTION INTRAVENOUS at 06:53

## 2018-05-03 RX ADMIN — Medication 10 ML: at 21:22

## 2018-05-03 RX ADMIN — CHLORDIAZEPOXIDE HYDROCHLORIDE 5 MG: 5 CAPSULE, GELATIN COATED ORAL at 14:01

## 2018-05-03 RX ADMIN — QUETIAPINE FUMARATE 100 MG: 100 TABLET ORAL at 21:22

## 2018-05-03 RX ADMIN — TRAZODONE HYDROCHLORIDE 75 MG: 50 TABLET ORAL at 21:22

## 2018-05-03 RX ADMIN — CITALOPRAM HYDROBROMIDE 20 MG: 20 TABLET ORAL at 09:53

## 2018-05-03 RX ADMIN — QUETIAPINE FUMARATE 100 MG: 100 TABLET ORAL at 09:54

## 2018-05-03 RX ADMIN — BENZTROPINE MESYLATE 2 MG: 1 TABLET ORAL at 09:54

## 2018-05-03 RX ADMIN — SODIUM CHLORIDE: 9 INJECTION, SOLUTION INTRAVENOUS at 20:09

## 2018-05-03 RX ADMIN — MELOXICAM 7.5 MG: 7.5 TABLET ORAL at 09:54

## 2018-05-03 RX ADMIN — CHLORDIAZEPOXIDE HYDROCHLORIDE 5 MG: 5 CAPSULE, GELATIN COATED ORAL at 09:54

## 2018-05-03 RX ADMIN — OXYCODONE HYDROCHLORIDE AND ACETAMINOPHEN 1 TABLET: 5; 325 TABLET ORAL at 20:19

## 2018-05-03 RX ADMIN — GABAPENTIN 300 MG: 300 CAPSULE ORAL at 21:21

## 2018-05-03 RX ADMIN — METOPROLOL SUCCINATE 25 MG: 25 TABLET, FILM COATED, EXTENDED RELEASE ORAL at 09:54

## 2018-05-03 RX ADMIN — CHLORDIAZEPOXIDE HYDROCHLORIDE 5 MG: 5 CAPSULE, GELATIN COATED ORAL at 21:21

## 2018-05-03 RX ADMIN — ANTACID TABLETS 500 MG: 500 TABLET, CHEWABLE ORAL at 09:55

## 2018-05-03 RX ADMIN — FAMOTIDINE 40 MG: 20 TABLET, FILM COATED ORAL at 09:54

## 2018-05-03 RX ADMIN — Medication 10 ML: at 09:54

## 2018-05-03 ASSESSMENT — PAIN DESCRIPTION - PAIN TYPE
TYPE: ACUTE PAIN
TYPE: ACUTE PAIN

## 2018-05-03 ASSESSMENT — PAIN DESCRIPTION - ONSET: ONSET: ON-GOING

## 2018-05-03 ASSESSMENT — PAIN DESCRIPTION - FREQUENCY: FREQUENCY: CONTINUOUS

## 2018-05-03 ASSESSMENT — PAIN SCALES - GENERAL
PAINLEVEL_OUTOF10: 7
PAINLEVEL_OUTOF10: 8
PAINLEVEL_OUTOF10: 5

## 2018-05-03 ASSESSMENT — PAIN DESCRIPTION - ORIENTATION
ORIENTATION: RIGHT;LEFT
ORIENTATION: RIGHT;LEFT;ANTERIOR

## 2018-05-03 ASSESSMENT — PAIN DESCRIPTION - LOCATION
LOCATION: LEG
LOCATION: LEG;HIP

## 2018-05-03 ASSESSMENT — PAIN DESCRIPTION - DESCRIPTORS: DESCRIPTORS: ACHING;SHARP

## 2018-05-04 VITALS
HEIGHT: 63 IN | TEMPERATURE: 98.6 F | DIASTOLIC BLOOD PRESSURE: 81 MMHG | HEART RATE: 74 BPM | BODY MASS INDEX: 31.89 KG/M2 | OXYGEN SATURATION: 92 % | SYSTOLIC BLOOD PRESSURE: 141 MMHG | RESPIRATION RATE: 16 BRPM | WEIGHT: 180 LBS

## 2018-05-04 LAB
HCT VFR BLD CALC: 43.5 % (ref 36–46)
HEMOGLOBIN: 14.3 G/DL (ref 12–16)
MCH RBC QN AUTO: 30.5 PG (ref 26–34)
MCHC RBC AUTO-ENTMCNC: 32.8 G/DL (ref 31–37)
MCV RBC AUTO: 93 FL (ref 80–100)
NRBC AUTOMATED: NORMAL PER 100 WBC
PDW BLD-RTO: 14 % (ref 11.5–14.5)
PLATELET # BLD: 152 K/UL (ref 130–400)
PMV BLD AUTO: 10.7 FL (ref 6–12)
RBC # BLD: 4.68 M/UL (ref 4–5.2)
WBC # BLD: 6 K/UL (ref 3.5–11)

## 2018-05-04 PROCEDURE — 97116 GAIT TRAINING THERAPY: CPT

## 2018-05-04 PROCEDURE — 6370000000 HC RX 637 (ALT 250 FOR IP): Performed by: INTERNAL MEDICINE

## 2018-05-04 PROCEDURE — 6370000000 HC RX 637 (ALT 250 FOR IP): Performed by: FAMILY MEDICINE

## 2018-05-04 PROCEDURE — 85027 COMPLETE CBC AUTOMATED: CPT

## 2018-05-04 PROCEDURE — 90792 PSYCH DIAG EVAL W/MED SRVCS: CPT | Performed by: NURSE PRACTITIONER

## 2018-05-04 PROCEDURE — 2580000003 HC RX 258: Performed by: PHYSICIAN ASSISTANT

## 2018-05-04 PROCEDURE — 36415 COLL VENOUS BLD VENIPUNCTURE: CPT

## 2018-05-04 PROCEDURE — 97110 THERAPEUTIC EXERCISES: CPT

## 2018-05-04 PROCEDURE — 6370000000 HC RX 637 (ALT 250 FOR IP): Performed by: NURSE PRACTITIONER

## 2018-05-04 RX ORDER — ESCITALOPRAM OXALATE 10 MG/1
10 TABLET ORAL DAILY
Qty: 30 TABLET | Refills: 0 | Status: ON HOLD
Start: 2018-05-04 | End: 2020-03-10

## 2018-05-04 RX ORDER — CLONAZEPAM 0.5 MG/1
0.5 TABLET ORAL 3 TIMES DAILY
Qty: 21 TABLET | Refills: 0 | Status: SHIPPED | OUTPATIENT
Start: 2018-05-04 | End: 2018-06-17

## 2018-05-04 RX ADMIN — METOPROLOL SUCCINATE 25 MG: 25 TABLET, FILM COATED, EXTENDED RELEASE ORAL at 08:17

## 2018-05-04 RX ADMIN — QUETIAPINE FUMARATE 100 MG: 100 TABLET ORAL at 08:17

## 2018-05-04 RX ADMIN — SIMVASTATIN 40 MG: 40 TABLET, FILM COATED ORAL at 08:17

## 2018-05-04 RX ADMIN — FAMOTIDINE 40 MG: 20 TABLET, FILM COATED ORAL at 08:17

## 2018-05-04 RX ADMIN — OXYCODONE HYDROCHLORIDE AND ACETAMINOPHEN 1 TABLET: 5; 325 TABLET ORAL at 11:35

## 2018-05-04 RX ADMIN — GABAPENTIN 300 MG: 300 CAPSULE ORAL at 15:01

## 2018-05-04 RX ADMIN — Medication 10 ML: at 08:18

## 2018-05-04 RX ADMIN — ANTACID TABLETS 500 MG: 500 TABLET, CHEWABLE ORAL at 08:17

## 2018-05-04 RX ADMIN — GABAPENTIN 300 MG: 300 CAPSULE ORAL at 08:17

## 2018-05-04 RX ADMIN — CHLORDIAZEPOXIDE HYDROCHLORIDE 5 MG: 5 CAPSULE, GELATIN COATED ORAL at 08:18

## 2018-05-04 RX ADMIN — MELOXICAM 7.5 MG: 7.5 TABLET ORAL at 08:17

## 2018-05-04 RX ADMIN — BENZTROPINE MESYLATE 2 MG: 1 TABLET ORAL at 08:18

## 2018-05-04 RX ADMIN — OXYCODONE HYDROCHLORIDE AND ACETAMINOPHEN 1 TABLET: 5; 325 TABLET ORAL at 06:15

## 2018-05-04 RX ADMIN — CITALOPRAM HYDROBROMIDE 20 MG: 20 TABLET ORAL at 08:18

## 2018-05-04 ASSESSMENT — PAIN DESCRIPTION - LOCATION
LOCATION: LEG
LOCATION: LEG

## 2018-05-04 ASSESSMENT — PAIN SCALES - GENERAL
PAINLEVEL_OUTOF10: 5
PAINLEVEL_OUTOF10: 7
PAINLEVEL_OUTOF10: 5
PAINLEVEL_OUTOF10: 7
PAINLEVEL_OUTOF10: 5

## 2018-05-04 ASSESSMENT — PAIN DESCRIPTION - PAIN TYPE
TYPE: ACUTE PAIN
TYPE: ACUTE PAIN

## 2018-05-04 ASSESSMENT — PAIN DESCRIPTION - ORIENTATION
ORIENTATION: RIGHT;LEFT
ORIENTATION: RIGHT;LEFT

## 2018-05-04 ASSESSMENT — PAIN DESCRIPTION - DESCRIPTORS: DESCRIPTORS: ACHING;SHARP

## 2018-05-04 ASSESSMENT — PAIN DESCRIPTION - FREQUENCY: FREQUENCY: CONTINUOUS

## 2018-05-04 ASSESSMENT — PAIN DESCRIPTION - ONSET: ONSET: ON-GOING

## 2018-05-06 LAB
CULTURE: NORMAL
CULTURE: NORMAL
Lab: NORMAL
SPECIMEN DESCRIPTION: NORMAL
SPECIMEN DESCRIPTION: NORMAL
STATUS: NORMAL
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
## 2018-06-17 ENCOUNTER — APPOINTMENT (OUTPATIENT)
Dept: CT IMAGING | Age: 73
End: 2018-06-17
Payer: MEDICARE

## 2018-06-17 ENCOUNTER — HOSPITAL ENCOUNTER (EMERGENCY)
Age: 73
Discharge: HOME | End: 2018-06-17
Attending: EMERGENCY MEDICINE
Payer: MEDICARE

## 2018-06-17 VITALS
RESPIRATION RATE: 16 BRPM | BODY MASS INDEX: 29.53 KG/M2 | DIASTOLIC BLOOD PRESSURE: 63 MMHG | HEART RATE: 68 BPM | OXYGEN SATURATION: 98 % | WEIGHT: 160.5 LBS | HEIGHT: 62 IN | TEMPERATURE: 98.6 F | SYSTOLIC BLOOD PRESSURE: 133 MMHG

## 2018-06-17 DIAGNOSIS — R10.9 FLANK PAIN: Primary | ICD-10-CM

## 2018-06-17 LAB
-: NORMAL
ABSOLUTE EOS #: 0.1 K/UL (ref 0–0.4)
ABSOLUTE IMMATURE GRANULOCYTE: ABNORMAL K/UL (ref 0–0.3)
ABSOLUTE LYMPH #: 1.2 K/UL (ref 1–4.8)
ABSOLUTE MONO #: 0.5 K/UL (ref 0.2–0.8)
AMORPHOUS: NORMAL
ANION GAP SERPL CALCULATED.3IONS-SCNC: 11 MMOL/L (ref 9–17)
BACTERIA: NORMAL
BASOPHILS # BLD: 0 % (ref 0–2)
BASOPHILS ABSOLUTE: 0 K/UL (ref 0–0.2)
BILIRUBIN URINE: NEGATIVE
BUN BLDV-MCNC: 14 MG/DL (ref 8–23)
BUN/CREAT BLD: 11 (ref 9–20)
CALCIUM SERPL-MCNC: 8.9 MG/DL (ref 8.6–10.4)
CASTS UA: NORMAL /LPF
CHLORIDE BLD-SCNC: 106 MMOL/L (ref 98–107)
CO2: 24 MMOL/L (ref 20–31)
COLOR: YELLOW
COMMENT UA: ABNORMAL
CREAT SERPL-MCNC: 1.31 MG/DL (ref 0.5–0.9)
CRYSTALS, UA: NORMAL /HPF
DIFFERENTIAL TYPE: ABNORMAL
EOSINOPHILS RELATIVE PERCENT: 3 % (ref 1–4)
EPITHELIAL CELLS UA: NORMAL /HPF (ref 0–5)
GFR AFRICAN AMERICAN: 48 ML/MIN
GFR NON-AFRICAN AMERICAN: 40 ML/MIN
GFR SERPL CREATININE-BSD FRML MDRD: ABNORMAL ML/MIN/{1.73_M2}
GFR SERPL CREATININE-BSD FRML MDRD: ABNORMAL ML/MIN/{1.73_M2}
GLUCOSE BLD-MCNC: 103 MG/DL (ref 70–99)
GLUCOSE URINE: NEGATIVE
HCT VFR BLD CALC: 44.1 % (ref 36–46)
HEMOGLOBIN: 14.3 G/DL (ref 12–16)
IMMATURE GRANULOCYTES: ABNORMAL %
KETONES, URINE: NEGATIVE
LEUKOCYTE ESTERASE, URINE: NEGATIVE
LYMPHOCYTES # BLD: 29 % (ref 24–44)
MCH RBC QN AUTO: 30.5 PG (ref 26–34)
MCHC RBC AUTO-ENTMCNC: 32.5 G/DL (ref 31–37)
MCV RBC AUTO: 94.1 FL (ref 80–100)
MONOCYTES # BLD: 12 % (ref 1–7)
MUCUS: NORMAL
NITRITE, URINE: NEGATIVE
NRBC AUTOMATED: ABNORMAL PER 100 WBC
OTHER OBSERVATIONS UA: NORMAL
PDW BLD-RTO: 14.1 % (ref 11.5–14.5)
PH UA: 5.5 (ref 5–8)
PLATELET # BLD: 146 K/UL (ref 130–400)
PLATELET ESTIMATE: ABNORMAL
PMV BLD AUTO: 11.4 FL (ref 6–12)
POTASSIUM SERPL-SCNC: 4.7 MMOL/L (ref 3.7–5.3)
PROTEIN UA: NEGATIVE
RBC # BLD: 4.69 M/UL (ref 4–5.2)
RBC # BLD: ABNORMAL 10*6/UL
RBC UA: NORMAL /HPF (ref 0–2)
RENAL EPITHELIAL, UA: NORMAL /HPF
SEG NEUTROPHILS: 56 % (ref 36–66)
SEGMENTED NEUTROPHILS ABSOLUTE COUNT: 2.3 K/UL (ref 1.8–7.7)
SODIUM BLD-SCNC: 141 MMOL/L (ref 135–144)
SPECIFIC GRAVITY UA: 1.01 (ref 1–1.03)
TRICHOMONAS: NORMAL
TURBIDITY: ABNORMAL
URINE HGB: ABNORMAL
UROBILINOGEN, URINE: NORMAL
WBC # BLD: 4.1 K/UL (ref 3.5–11)
WBC # BLD: ABNORMAL 10*3/UL
WBC UA: NORMAL /HPF (ref 0–5)
YEAST: NORMAL

## 2018-06-17 PROCEDURE — 74176 CT ABD & PELVIS W/O CONTRAST: CPT

## 2018-06-17 PROCEDURE — 96375 TX/PRO/DX INJ NEW DRUG ADDON: CPT

## 2018-06-17 PROCEDURE — 99284 EMERGENCY DEPT VISIT MOD MDM: CPT

## 2018-06-17 PROCEDURE — 81001 URINALYSIS AUTO W/SCOPE: CPT

## 2018-06-17 PROCEDURE — 96374 THER/PROPH/DIAG INJ IV PUSH: CPT

## 2018-06-17 PROCEDURE — 2580000003 HC RX 258: Performed by: NURSE PRACTITIONER

## 2018-06-17 PROCEDURE — 6360000002 HC RX W HCPCS: Performed by: NURSE PRACTITIONER

## 2018-06-17 PROCEDURE — 87086 URINE CULTURE/COLONY COUNT: CPT

## 2018-06-17 PROCEDURE — 80048 BASIC METABOLIC PNL TOTAL CA: CPT

## 2018-06-17 PROCEDURE — 85025 COMPLETE CBC W/AUTO DIFF WBC: CPT

## 2018-06-17 RX ORDER — 0.9 % SODIUM CHLORIDE 0.9 %
500 INTRAVENOUS SOLUTION INTRAVENOUS ONCE
Status: COMPLETED | OUTPATIENT
Start: 2018-06-17 | End: 2018-06-17

## 2018-06-17 RX ORDER — MORPHINE SULFATE 2 MG/ML
2 INJECTION, SOLUTION INTRAMUSCULAR; INTRAVENOUS ONCE
Status: COMPLETED | OUTPATIENT
Start: 2018-06-17 | End: 2018-06-17

## 2018-06-17 RX ORDER — 0.9 % SODIUM CHLORIDE 0.9 %
500 INTRAVENOUS SOLUTION INTRAVENOUS ONCE
Status: DISCONTINUED | OUTPATIENT
Start: 2018-06-17 | End: 2018-06-17 | Stop reason: HOSPADM

## 2018-06-17 RX ORDER — ONDANSETRON 4 MG/1
4 TABLET, FILM COATED ORAL EVERY 6 HOURS PRN
Status: ON HOLD | COMMUNITY
End: 2020-02-25 | Stop reason: HOSPADM

## 2018-06-17 RX ORDER — ONDANSETRON 2 MG/ML
4 INJECTION INTRAMUSCULAR; INTRAVENOUS ONCE
Status: COMPLETED | OUTPATIENT
Start: 2018-06-17 | End: 2018-06-17

## 2018-06-17 RX ORDER — CLONAZEPAM 0.5 MG/1
0.5 TABLET ORAL 2 TIMES DAILY
Status: ON HOLD | COMMUNITY
End: 2020-07-07 | Stop reason: SDUPTHER

## 2018-06-17 RX ORDER — BUSPIRONE HYDROCHLORIDE 7.5 MG/1
20 TABLET ORAL 3 TIMES DAILY
Status: ON HOLD | COMMUNITY
End: 2020-03-10

## 2018-06-17 RX ORDER — CALCIUM CARBONATE 200(500)MG
1 TABLET,CHEWABLE ORAL 3 TIMES DAILY PRN
COMMUNITY
End: 2022-09-01

## 2018-06-17 RX ORDER — POLYETHYLENE GLYCOL 3350 17 G/17G
17 POWDER, FOR SOLUTION ORAL DAILY PRN
Status: ON HOLD | COMMUNITY
End: 2020-04-06 | Stop reason: HOSPADM

## 2018-06-17 RX ORDER — METHOCARBAMOL 500 MG/1
500 TABLET, FILM COATED ORAL 3 TIMES DAILY PRN
Qty: 20 TABLET | Refills: 0 | Status: SHIPPED | OUTPATIENT
Start: 2018-06-17 | End: 2018-06-27

## 2018-06-17 RX ADMIN — MORPHINE SULFATE 2 MG: 2 INJECTION, SOLUTION INTRAMUSCULAR; INTRAVENOUS at 11:13

## 2018-06-17 RX ADMIN — ONDANSETRON 4 MG: 2 INJECTION INTRAMUSCULAR; INTRAVENOUS at 09:51

## 2018-06-17 RX ADMIN — SODIUM CHLORIDE 500 ML: 9 INJECTION, SOLUTION INTRAVENOUS at 09:51

## 2018-06-17 ASSESSMENT — ENCOUNTER SYMPTOMS
VOMITING: 0
ABDOMINAL PAIN: 1
BACK PAIN: 1
DIARRHEA: 0
CONSTIPATION: 0
SHORTNESS OF BREATH: 0
NAUSEA: 1

## 2018-06-17 ASSESSMENT — PAIN SCALES - GENERAL
PAINLEVEL_OUTOF10: 9
PAINLEVEL_OUTOF10: 9
PAINLEVEL_OUTOF10: 6

## 2018-06-19 LAB
CULTURE: NO GROWTH
Lab: NORMAL
SPECIMEN DESCRIPTION: NORMAL
STATUS: NORMAL

## 2018-06-29 ENCOUNTER — APPOINTMENT (OUTPATIENT)
Dept: CT IMAGING | Age: 73
End: 2018-06-29
Payer: MEDICARE

## 2018-06-29 ENCOUNTER — HOSPITAL ENCOUNTER (EMERGENCY)
Age: 73
Discharge: HOME | End: 2018-06-29
Attending: EMERGENCY MEDICINE
Payer: MEDICARE

## 2018-06-29 VITALS
HEART RATE: 85 BPM | DIASTOLIC BLOOD PRESSURE: 87 MMHG | RESPIRATION RATE: 16 BRPM | OXYGEN SATURATION: 99 % | SYSTOLIC BLOOD PRESSURE: 155 MMHG | TEMPERATURE: 98.3 F | HEIGHT: 62 IN | WEIGHT: 158 LBS | BODY MASS INDEX: 29.08 KG/M2

## 2018-06-29 DIAGNOSIS — R10.30 LOWER ABDOMINAL PAIN: Primary | ICD-10-CM

## 2018-06-29 LAB
-: NORMAL
ABSOLUTE EOS #: 0.1 K/UL (ref 0–0.4)
ABSOLUTE IMMATURE GRANULOCYTE: ABNORMAL K/UL (ref 0–0.3)
ABSOLUTE LYMPH #: 1.2 K/UL (ref 1–4.8)
ABSOLUTE MONO #: 0.6 K/UL (ref 0.2–0.8)
AMORPHOUS: NORMAL
ANION GAP SERPL CALCULATED.3IONS-SCNC: 12 MMOL/L (ref 9–17)
APPEARANCE: CLEAR
BACTERIA: NORMAL
BASOPHILS # BLD: 1 % (ref 0–2)
BASOPHILS ABSOLUTE: 0 K/UL (ref 0–0.2)
BILIRUBIN URINE: NEGATIVE
BILIRUBIN, POC: NEGATIVE
BLOOD URINE, POC: NORMAL
BUN BLDV-MCNC: 6 MG/DL (ref 8–23)
BUN/CREAT BLD: 6 (ref 9–20)
CALCIUM SERPL-MCNC: 9.4 MG/DL (ref 8.6–10.4)
CASTS UA: NORMAL /LPF
CHLORIDE BLD-SCNC: 104 MMOL/L (ref 98–107)
CHP ED QC CHECK: YES
CLARITY, POC: CLEAR
CO2: 24 MMOL/L (ref 20–31)
COLOR, POC: YELLOW
COLOR: YELLOW
COMMENT UA: ABNORMAL
CREAT SERPL-MCNC: 1 MG/DL (ref 0.5–0.9)
CRYSTALS, UA: NORMAL /HPF
DIFFERENTIAL TYPE: ABNORMAL
EOSINOPHILS RELATIVE PERCENT: 2 % (ref 1–4)
EPITHELIAL CELLS UA: NORMAL /HPF (ref 0–5)
GFR AFRICAN AMERICAN: >60 ML/MIN
GFR NON-AFRICAN AMERICAN: 55 ML/MIN
GFR SERPL CREATININE-BSD FRML MDRD: ABNORMAL ML/MIN/{1.73_M2}
GFR SERPL CREATININE-BSD FRML MDRD: ABNORMAL ML/MIN/{1.73_M2}
GLUCOSE BLD-MCNC: 105 MG/DL (ref 70–99)
GLUCOSE URINE, POC: NEGATIVE
GLUCOSE URINE: NEGATIVE
HCT VFR BLD CALC: 44.9 % (ref 36–46)
HEMOGLOBIN: 14.6 G/DL (ref 12–16)
IMMATURE GRANULOCYTES: ABNORMAL %
KETONES, POC: NEGATIVE
KETONES, URINE: NEGATIVE
LEUKOCYTE EST, POC: NEGATIVE
LEUKOCYTE ESTERASE, URINE: NEGATIVE
LYMPHOCYTES # BLD: 30 % (ref 24–44)
MCH RBC QN AUTO: 30.1 PG (ref 26–34)
MCHC RBC AUTO-ENTMCNC: 32.4 G/DL (ref 31–37)
MCV RBC AUTO: 93 FL (ref 80–100)
MONOCYTES # BLD: 14 % (ref 1–7)
MUCUS: NORMAL
NITRITE, POC: NEGATIVE
NITRITE, URINE: NEGATIVE
NRBC AUTOMATED: ABNORMAL PER 100 WBC
OTHER OBSERVATIONS UA: NORMAL
PDW BLD-RTO: 13.8 % (ref 11.5–14.5)
PH UA: 6.5 (ref 5–8)
PH, POC: 7
PLATELET # BLD: 130 K/UL (ref 130–400)
PLATELET ESTIMATE: ABNORMAL
PMV BLD AUTO: 10.8 FL (ref 6–12)
POTASSIUM SERPL-SCNC: 4 MMOL/L (ref 3.7–5.3)
PROTEIN UA: NEGATIVE
PROTEIN, POC: NEGATIVE
RBC # BLD: 4.83 M/UL (ref 4–5.2)
RBC # BLD: ABNORMAL 10*6/UL
RBC UA: NORMAL /HPF (ref 0–2)
RENAL EPITHELIAL, UA: NORMAL /HPF
SEG NEUTROPHILS: 53 % (ref 36–66)
SEGMENTED NEUTROPHILS ABSOLUTE COUNT: 2.2 K/UL (ref 1.8–7.7)
SODIUM BLD-SCNC: 140 MMOL/L (ref 135–144)
SPECIFIC GRAVITY UA: 1 (ref 1–1.03)
SPECIFIC GRAVITY, POC: 1.01
TRICHOMONAS: NORMAL
TURBIDITY: CLEAR
URINE HGB: ABNORMAL
UROBILINOGEN, POC: 0.2
UROBILINOGEN, URINE: NORMAL
WBC # BLD: 4.1 K/UL (ref 3.5–11)
WBC # BLD: ABNORMAL 10*3/UL
WBC UA: NORMAL /HPF (ref 0–5)
YEAST: NORMAL

## 2018-06-29 PROCEDURE — 96374 THER/PROPH/DIAG INJ IV PUSH: CPT

## 2018-06-29 PROCEDURE — 6360000004 HC RX CONTRAST MEDICATION: Performed by: EMERGENCY MEDICINE

## 2018-06-29 PROCEDURE — 99284 EMERGENCY DEPT VISIT MOD MDM: CPT

## 2018-06-29 PROCEDURE — 80048 BASIC METABOLIC PNL TOTAL CA: CPT

## 2018-06-29 PROCEDURE — 81001 URINALYSIS AUTO W/SCOPE: CPT

## 2018-06-29 PROCEDURE — 74177 CT ABD & PELVIS W/CONTRAST: CPT

## 2018-06-29 PROCEDURE — 2580000003 HC RX 258: Performed by: EMERGENCY MEDICINE

## 2018-06-29 PROCEDURE — 6360000002 HC RX W HCPCS: Performed by: EMERGENCY MEDICINE

## 2018-06-29 PROCEDURE — 85025 COMPLETE CBC W/AUTO DIFF WBC: CPT

## 2018-06-29 RX ORDER — PHENAZOPYRIDINE HYDROCHLORIDE 200 MG/1
200 TABLET, FILM COATED ORAL 3 TIMES DAILY PRN
Qty: 15 TABLET | Refills: 0 | Status: SHIPPED | OUTPATIENT
Start: 2018-06-29 | End: 2020-02-10 | Stop reason: ALTCHOICE

## 2018-06-29 RX ORDER — 0.9 % SODIUM CHLORIDE 0.9 %
80 INTRAVENOUS SOLUTION INTRAVENOUS ONCE
Status: COMPLETED | OUTPATIENT
Start: 2018-06-29 | End: 2018-06-29

## 2018-06-29 RX ORDER — SODIUM CHLORIDE 0.9 % (FLUSH) 0.9 %
10 SYRINGE (ML) INJECTION
Status: COMPLETED | OUTPATIENT
Start: 2018-06-29 | End: 2018-06-29

## 2018-06-29 RX ORDER — MORPHINE SULFATE 4 MG/ML
4 INJECTION, SOLUTION INTRAMUSCULAR; INTRAVENOUS ONCE
Status: COMPLETED | OUTPATIENT
Start: 2018-06-29 | End: 2018-06-29

## 2018-06-29 RX ADMIN — MORPHINE SULFATE 4 MG: 4 INJECTION, SOLUTION INTRAMUSCULAR; INTRAVENOUS at 11:06

## 2018-06-29 RX ADMIN — SODIUM CHLORIDE 80 ML: 9 INJECTION, SOLUTION INTRAVENOUS at 10:37

## 2018-06-29 RX ADMIN — IOPAMIDOL 75 ML: 755 INJECTION, SOLUTION INTRAVENOUS at 10:36

## 2018-06-29 RX ADMIN — Medication 10 ML: at 10:37

## 2018-06-29 ASSESSMENT — PAIN DESCRIPTION - DESCRIPTORS: DESCRIPTORS: CONSTANT

## 2018-06-29 ASSESSMENT — ENCOUNTER SYMPTOMS
ABDOMINAL PAIN: 0
SHORTNESS OF BREATH: 0
CONSTIPATION: 0
EYE DISCHARGE: 0
DIARRHEA: 0
COUGH: 0
COLOR CHANGE: 0
FACIAL SWELLING: 0
VOMITING: 0
EYE REDNESS: 0

## 2018-06-29 ASSESSMENT — PAIN DESCRIPTION - ORIENTATION: ORIENTATION: LOWER

## 2018-06-29 ASSESSMENT — PAIN DESCRIPTION - PAIN TYPE: TYPE: ACUTE PAIN

## 2018-06-29 ASSESSMENT — PAIN SCALES - GENERAL
PAINLEVEL_OUTOF10: 9
PAINLEVEL_OUTOF10: 6
PAINLEVEL_OUTOF10: 9

## 2018-06-29 ASSESSMENT — PAIN DESCRIPTION - PROGRESSION: CLINICAL_PROGRESSION: GRADUALLY IMPROVING

## 2018-06-29 ASSESSMENT — PAIN DESCRIPTION - LOCATION: LOCATION: ABDOMEN

## 2018-06-29 NOTE — ED NOTES
Pt to room 14 via w/c with c/o suprapubic pain and dysuria, onset approximately 3 days ago. Pt reports she was seen and tx'd at urgent care at time of onset, dx with UTI, and Rx'd PO Cipro with no relief. Pt states \"Last night around 9:30 I went to pee and I had these excruciating bladder spasms and it's so painful\". Pt reports she was also seen and tx'd per PCP approximately 1 month ago for UTI and was Rx'd PO Bactrim, states \"I was okay after the Bactrim but then it just seemed like it came back again\". Pt reports diaphoresis with bladder pain last night, slight lightheadedness, and intermittent nausea. Pt denies any CP, SOB, emesis, diarrhea, or fevers. Abd soft, rounded, and BS active x4 quads. Respirations even and non labored. Skin pale, cool, and dry, MMM. NAD noted.       Terrell Khan RN  06/29/18 6525

## 2018-06-29 NOTE — ED PROVIDER NOTES
[address] [place] [address] ED  eMERGENCY dEPARTMENT eNCOUnter      Pt Name: Geovanna Lerma  MRN: [id number]  [place] 1945  Date of evaluation: 6/29/2018  Provider: Theodore Santo MD    CHIEF COMPLAINT       Chief Complaint   Patient presents with    Abdominal Pain    Dysuria         HISTORY OF PRESENT ILLNESS  (Location/Symptom, Timing/Onset, Context/Setting, Quality, Duration, Modifying Factors, Severity.)   Geovanna Lerma is a 67 y.o. female who presents to the emergency department For abdominal pain. 3 days ago she went to an urgent care center and was diagnosed with UTI and was put on Cipro. Tendinitis seemed to get a lot worse and she had felt severe pain in the suprapubic area and describes it as a bladder spasm. No gross hematuria or back pain. She states she broke out into a sweat but did not have fever. No injury. Several weeks ago she was also diagnosed with UTI and was placed on Bactrim. Nursing Notes were reviewed. ALLERGIES     Compazine [prochlorperazine maleate]; Penicillin v potassium; and Penicillins    CURRENT MEDICATIONS       Previous Medications    ACETAMINOPHEN 500 MG CAPS    Take 1 tablet by mouth as needed for Pain (follow OTC instructions)    BUSPIRONE (BUSPAR) 7.5 MG TABLET    Take 7.5 mg by mouth 3 times daily    CALCIUM CARBONATE (TUMS) 500 MG CHEWABLE TABLET    Take 1 tablet by mouth 3 times daily as needed for Heartburn    CLONAZEPAM (KLONOPIN) 0.5 MG TABLET    Take 0.5 mg by mouth every 8 hours. .    ESCITALOPRAM (LEXAPRO) 10 MG TABLET    Take 1 tablet by mouth daily    FAMOTIDINE (PEPCID) 40 MG TABLET    take 1 tablet by mouth once daily    GABAPENTIN (NEURONTIN) 300 MG CAPSULE    Take 300 mg by mouth 3 times daily. MELOXICAM (MOBIC) 7.5 MG TABLET    Take 7.5 mg by mouth daily.     METOPROLOL SUCCINATE (TOPROL XL) 25 MG EXTENDED RELEASE TABLET    take 1 tablet by mouth once daily    ONDANSETRON (ZOFRAN) 4 MG TABLET    Take 4 mg by mouth every 6 hours as needed for Nausea or Vomiting    OYSTER SHELL 500 MG TABS    Take 500 mg by mouth daily    PANTOPRAZOLE (PROTONIX) 40 MG TABLET    Take 40 mg by mouth daily     POLYETHYLENE GLYCOL (MIRALAX) PACK PACKET    Take 17 g by mouth daily as needed    SIMVASTATIN (ZOCOR) 40 MG TABLET    Take 40 mg by mouth nightly. TRAZODONE (DESYREL) 150 MG TABLET    Take 75 mg by mouth nightly. PAST MEDICAL HISTORY         Diagnosis Date    Anxiety     Arthritis     Back pain     Bronchitis     Caffeine use     8 coffee / day    Carpal tunnel syndrome     Cataracts, bilateral     Constipation     Depression     Diarrhea     GERD (gastroesophageal reflux disease)     Hyperlipidemia     Mumps     MVP (mitral valve prolapse)     Recurrent UTI     Sinusitis     Ulcerative esophagitis        SURGICAL HISTORY           Procedure Laterality Date    APPENDECTOMY      CARPAL TUNNEL RELEASE      CYSTOSCOPY      HAND SURGERY      HYSTERECTOMY      Abdominal    OVARY REMOVAL      RHINOPLASTY      TONSILLECTOMY      TOTAL HIP ARTHROPLASTY      TOTAL NEPHRECTOMY      atrophic from infections, age 13   [employer] TUBAL LIGATION           FAMILY HISTORY       Family History   Problem Relation Age of Onset    Cancer Mother         uterine    Heart Attack Mother     Heart Attack Father      Family Status   Relation Status    Mother     Father         SOCIAL HISTORY      reports that she has been smoking Cigarettes. She has a 50.00 pack-year smoking history. She has never used smokeless tobacco. She reports that she does not drink alcohol or use drugs. REVIEW OF SYSTEMS    (2-9 systems for level 4, 10 or more for level 5)     Review of Systems   Constitutional: Negative for chills, fatigue and fever. HENT: Negative for congestion, ear discharge and facial swelling. Eyes: Negative for discharge and redness. Respiratory: Negative for cough and shortness of breath. Cardiovascular: Negative for chest pain. Gastrointestinal: Negative for abdominal pain, constipation, diarrhea and vomiting. Genitourinary: Positive for dysuria. Negative for flank pain and hematuria. Musculoskeletal: Negative for arthralgias. Skin: Negative for color change and rash. Neurological: Negative for syncope, numbness and headaches. Hematological: Negative for adenopathy. Psychiatric/Behavioral: Negative for confusion. The patient is not nervous/anxious. Except as noted above the remainder of the review of systems was reviewed and negative. PHYSICAL EXAM    (up to 7 for level 4, 8 or more for level 5)     Vitals:    06/29/18 0831   BP: (!) 155/87   Pulse: 85   Resp: 16   Temp: 98.3 °F (36.8 °C)   TempSrc: Oral   SpO2: 99%   Weight: 158 lb (71.7 kg)   Height: 5' 2\" (1.575 m)       Physical Exam   Constitutional: She is oriented to person, place, and time. She appears well-developed and well-nourished. No distress. HENT:   Head: Normocephalic and atraumatic. Eyes: Right eye exhibits no discharge. Left eye exhibits no discharge. No scleral icterus. Neck: Neck supple. Cardiovascular: Normal rate and regular rhythm. Pulmonary/Chest: Effort normal and breath sounds normal. No stridor. No respiratory distress. She has no wheezes. She has no rales. Abdominal: Soft. She exhibits no distension and no mass. There is no tenderness. There is no rebound and no guarding. Musculoskeletal: Normal range of motion. Lymphadenopathy:     She has no cervical adenopathy. Neurological: She is alert and oriented to person, place, and time. Skin: Skin is warm and dry. No rash noted. She is not diaphoretic. No erythema. Psychiatric: She has a normal mood and affect. Her behavior is normal.   Vitals reviewed.         DIAGNOSTIC RESULTS     EKG: All EKG's are interpreted by the Emergency Department Physician who either signs or Co-signs this chart in the absence of a cardiologist.    Not indicated    RADIOLOGY:   Skyler Sober without evidence cholecystitis. Small hiatal hernia. LABS:  Labs Reviewed   URINALYSIS - Abnormal; Notable for the following:        Result Value    Specific Gravity, UA 1.003 (*)     Urine Hgb TRACE (*)     All other components within normal limits   CBC WITH AUTO DIFFERENTIAL - Abnormal; Notable for the following:     Monocytes 14 (*)     All other components within normal limits   BASIC METABOLIC PANEL - Abnormal; Notable for the following:     Glucose 105 (*)     BUN 6 (*)     CREATININE 1.00 (*)     Bun/Cre Ratio 6 (*)     GFR Non- 55 (*)     All other components within normal limits   POCT URINALYSIS DIPSTICK - Normal   MICROSCOPIC URINALYSIS       All other labs were within normal range or not returned as of this dictation. EMERGENCY DEPARTMENT COURSE and DIFFERENTIAL DIAGNOSIS/MDM:   Vitals:    Vitals:    06/29/18 0831   BP: (!) 155/87   Pulse: 85   Resp: 16   Temp: 98.3 °F (36.8 °C)   TempSrc: Oral   SpO2: 99%   Weight: 158 lb (71.7 kg)   Height: 5' 2\" (1.575 m)       Orders Placed This Encounter   Medications    0.9 % sodium chloride bolus    iopamidol (ISOVUE-370) 76 % injection 75 mL    sodium chloride flush 0.9 % injection 10 mL    morphine injection 4 mg    phenazopyridine (PYRIDIUM) 200 MG tablet     Sig: Take 1 tablet by mouth 3 times daily as needed for Pain (bladder spasm/pain)     Dispense:  15 tablet     Refill:  0       Medical Decision Making: The patient's workup is negative. She will be treated symptomatically with Pyridium and will follow-up with her family doctor. Treatment diagnosis and follow-up were discussed with the patient. CONSULTS:  None    PROCEDURES:  None    FINAL IMPRESSION      1.  Lower abdominal pain          DISPOSITION/PLAN   DISPOSITION Decision To Discharge 06/29/2018 12:05:43 PM      PATIENT REFERRED TO:   Matthew Mcdonald MD  [address] [facility] [address] [phone]      As needed    [facility] ED  [place] [address]

## 2020-02-04 ENCOUNTER — INITIAL CONSULT (OUTPATIENT)
Dept: BARIATRICS/WEIGHT MGMT | Age: 75
End: 2020-02-04
Payer: MEDICARE

## 2020-02-04 VITALS
BODY MASS INDEX: 34.27 KG/M2 | SYSTOLIC BLOOD PRESSURE: 128 MMHG | HEART RATE: 72 BPM | DIASTOLIC BLOOD PRESSURE: 70 MMHG | HEIGHT: 62 IN | RESPIRATION RATE: 20 BRPM | WEIGHT: 186.2 LBS

## 2020-02-04 PROBLEM — R13.19 ESOPHAGEAL DYSPHAGIA: Status: ACTIVE | Noted: 2020-02-04

## 2020-02-04 PROCEDURE — 4004F PT TOBACCO SCREEN RCVD TLK: CPT | Performed by: SURGERY

## 2020-02-04 PROCEDURE — 3017F COLORECTAL CA SCREEN DOC REV: CPT | Performed by: SURGERY

## 2020-02-04 PROCEDURE — 1123F ACP DISCUSS/DSCN MKR DOCD: CPT | Performed by: SURGERY

## 2020-02-04 PROCEDURE — G8417 CALC BMI ABV UP PARAM F/U: HCPCS | Performed by: SURGERY

## 2020-02-04 PROCEDURE — 1090F PRES/ABSN URINE INCON ASSESS: CPT | Performed by: SURGERY

## 2020-02-04 PROCEDURE — G8484 FLU IMMUNIZE NO ADMIN: HCPCS | Performed by: SURGERY

## 2020-02-04 PROCEDURE — 99204 OFFICE O/P NEW MOD 45 MIN: CPT | Performed by: SURGERY

## 2020-02-04 PROCEDURE — 4040F PNEUMOC VAC/ADMIN/RCVD: CPT | Performed by: SURGERY

## 2020-02-04 PROCEDURE — G8400 PT W/DXA NO RESULTS DOC: HCPCS | Performed by: SURGERY

## 2020-02-04 PROCEDURE — G8427 DOCREV CUR MEDS BY ELIG CLIN: HCPCS | Performed by: SURGERY

## 2020-02-04 NOTE — PATIENT INSTRUCTIONS
myotomy. In this surgery, the doctor makes small cuts on the muscles inside the esophagus. This allows the opening to the stomach to relax so that food and drinks can pass through. It can be done through a thin tube that is placed in a small cut in the belly. Sometimes it's done through a larger cut (incision). You will stay in the hospital for 1 or 2 days after the surgery. Peroral endoscopic myotomy (POEM). This surgery also involves making small cuts on the muscles. But it's done through a scope that goes in through the mouth. You will stay in the hospital for a day after the surgery. Achalasia may also be treated with a botulinum toxin A (Botox) injection. Botox is injected into the muscle near the opening to the stomach through a scope. It helps the muscle relax. You may need another shot of Botox after 6 months or a year. Follow-up care is a key part of your treatment and safety. Be sure to make and go to all appointments, and call your doctor if you are having problems. It's also a good idea to know your test results and keep a list of the medicines you take. Where can you learn more? Go to [email or web address] and sign in to your [employer] account. Enter A002 in the [employer] Manager box to learn more about \"Learning About Achalasia. \"     If you do not have an account, please click on the \"Sign Up Now\" link. Current as of: August 11, 2019  Content Version: 12.3  © 4653-8433 Healthwise, Incorporated. Care instructions adapted under license by [facility] ([facility]). If you have questions about a medical condition or this instruction, always ask your healthcare professional. [address] any warranty or liability for your use of this information.

## 2020-02-04 NOTE — PROGRESS NOTES
[]   [] Gallstones   []   []   Anemia   []   [] Refulx or Heartburn   []   []   Blood Clots   []   []      High Cholesterol   [x]   [] Muscoloskeletal YES NO   High Triglycerides   []   [] Joint Limitations   []   []      Muscle Weakness   []   []   Eyes, Ears, Nose, Throat YES NO Multiple Sclerosis   []   []   Cataracts   []   [] Arthritis   []   []   Glasses   [x]   []      Blurred Vision   [x]   [] Cancer   []   []   Hearing Aids   []   [] Type:     Ringing in Ears   []   []      Difficulty Swallowing   [x]   [] Encodrine YES NO      Diabetes   []   []   Neurological YES NO Thyroid   []   []   Stroke   []   []      Seizure   []   [] Psychiatric Disorder YES NO   Dizziness/Blackouts/Fainting   []   [] Depression   [x]   []   Memory Impairement   [x]   [] Bipolar   []   []   Parkinson's   []   [] Anxiety disorder   [x]   []           Genitourinary/Gyn YES NO Skin Intact   [x]   []   Urinary Infection   []   []      Stones   []   [] Sleep YES NO   Kidney Disease   []   [] Excessive daytime sleepiness   []   []   Incontinent   []   [] Snoring   []   []   Irregular menstrual cycles   []   [] Unrefreshed sleep   []   []   Possibly Pregnant? []     [] Other:     Date of LMP:   Preferred location:               Allergies:   Allergies   Allergen Reactions    Compazine [Prochlorperazine Maleate]     Penicillin V Potassium     Penicillins Other (See Comments)     shock       Past Surgical History:  Past Surgical History:   Procedure Laterality Date    APPENDECTOMY      CARPAL TUNNEL RELEASE      CYSTOSCOPY      HAND SURGERY      HYSTERECTOMY      Abdominal    OVARY REMOVAL      RHINOPLASTY      TONSILLECTOMY      TOTAL HIP ARTHROPLASTY      TOTAL NEPHRECTOMY      atrophic from infections, age 13   [employer] TUBAL LIGATION         Family History:  Family History   Problem Relation Age of Onset    Cancer Mother         uterine    Heart Attack Mother     Heart Attack Father        Social History:  Social History Socioeconomic History    Marital status:      Spouse name: Not on file    Number of children: 2    Years of education: Not on file    Highest education level: Not on file   Occupational History    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   Social Needs    Financial resource strain: Not on file    Food insecurity:     Worry: Not on file     Inability: Not on file    Transportation needs:     Medical: Not on file     Non-medical: Not on file   Tobacco Use    Smoking status: Current Every Day Smoker     Packs/day: 1.00     Years: 50.00     Pack years: 50.00     Types: Cigarettes    Smokeless tobacco: Never Used   Substance and Sexual Activity    Alcohol use: No    Drug use: No    Sexual activity: Never   Lifestyle    Physical activity:     Days per week: Not on file     Minutes per session: Not on file    Stress: Not on file   Relationships    Social connections:     Talks on phone: Not on file     Gets together: Not on file     Attends Christianity service: Not on file     Active member of club or organization: Not on file     Attends meetings of clubs or organizations: Not on file     Relationship status: Not on file    Intimate partner violence:     Fear of current or ex partner: Not on file     Emotionally abused: Not on file     Physically abused: Not on file     Forced sexual activity: Not on file   Other Topics Concern    Not on file   Social History Narrative    Not on file         Objective      Physical Exam   Constitutional:  Vital signs are normal. The patient appears well-developed and well-nourished. HEENT:   Head: Normocephalic. Atraumatic  Eyes: pupils are equal and reactive. No scleral icterus is present. Neck: No mass and no thyromegaly present. Cardiovascular: Normal rate, regular rhythm, S1 normal and S2 normal.    Pulmonary/Chest: Effort normal and breath sounds normal.   Abdominal: Soft. Normal appearance. There is no organomegaly. No tenderness.  There is no rigidity, no rebound, no guarding and no Kendrick's sign. Musculoskeletal:        Right lower leg: Normal. No tenderness and no edema. Left lower leg: Normal. No tenderness and no edema. Lymphadenopathy:     No cervical adenopathy, No Exrtemity Adenopathy. Neurological: The patient is alert and oriented. Skin: Skin is warm, dry and intact. Psychiatric: The patient has a normal mood and affect. Speech is normal and behavior is normal. Judgment and thought content normal. Cognition and memory are normal.     Esophagram:    FINDINGS:    There is no abnormality of swallowing function. There is a moderate-sized hiatal hernia resulting in a tortuous  redundant distal esophagus. Evidence of esophageal dysmotility with incomplete and week primary  stripping wave and disorganized tertiary contractions, resulting in a  standing column of contrast.    Esophageal contour pattern is normal. There are no esophageal  strictures, diverticula or abnormal filling defects. No gastroesophageal  reflux is observed. 13 mm barium pill passed easily from the esophagus to the stomach. IMPRESSION:   IMPRESSION:    1. Moderate sized sliding hiatal hernia with a tortuous distal  esophagus. 2. Evidence of esophageal dysmotility with limited peristalsis and a  standing column of contrast.    3. No focal esophageal stricture or narrowing of the GE junction. No  evidence to suggest achalasia. Assessment     Patient Active Problem List   Diagnosis    Frequency of urination    Severe sepsis (HCC)    Back pain    GERD (gastroesophageal reflux disease)    MVP (mitral valve prolapse)    Depression    Anxiety    Urine retention    Acute kidney injury ([address]    Esophageal dysphagia     Hiatal hernia hernia and GERD  Esophageal motility-possible achalasia    Plan   We discussed the diagnosis of achalasia. Without esophageal manometry I explained that this cannot be definitively diagnosed.   Her esophagram does not suggest true

## 2020-02-05 ENCOUNTER — TELEPHONE (OUTPATIENT)
Dept: BARIATRICS/WEIGHT MGMT | Age: 75
End: 2020-02-05

## 2020-02-05 NOTE — TELEPHONE ENCOUNTER
Pt saw Dr. Mahamed Ugalde in consultation yesterday and will be a general surgery case later this month. She states that she forgot to tell him about this additional problem and is calling for advise. She has sudden and unanticipated diarrhea. States \"it just shoots out of me and I don't know what to do\".  She takes lomotil after the fact but is looking to be more proactive about her situation

## 2020-02-10 ENCOUNTER — HOSPITAL ENCOUNTER (OUTPATIENT)
Dept: PREADMISSION TESTING | Age: 75
Discharge: HOME | End: 2020-02-14
Payer: MEDICARE

## 2020-02-10 VITALS
OXYGEN SATURATION: 93 % | HEART RATE: 94 BPM | BODY MASS INDEX: 34.24 KG/M2 | DIASTOLIC BLOOD PRESSURE: 62 MMHG | HEIGHT: 62 IN | TEMPERATURE: 97.3 F | WEIGHT: 186.07 LBS | SYSTOLIC BLOOD PRESSURE: 113 MMHG

## 2020-02-10 LAB
ANION GAP SERPL CALCULATED.3IONS-SCNC: 17 MMOL/L (ref 9–17)
BUN BLDV-MCNC: 16 MG/DL (ref 8–23)
BUN/CREAT BLD: ABNORMAL (ref 9–20)
CALCIUM SERPL-MCNC: 10.9 MG/DL (ref 8.6–10.4)
CHLORIDE BLD-SCNC: 99 MMOL/L (ref 98–107)
CO2: 24 MMOL/L (ref 20–31)
CREAT SERPL-MCNC: 1.18 MG/DL (ref 0.5–0.9)
GFR AFRICAN AMERICAN: 54 ML/MIN
GFR NON-AFRICAN AMERICAN: 45 ML/MIN
GFR SERPL CREATININE-BSD FRML MDRD: ABNORMAL ML/MIN/{1.73_M2}
GFR SERPL CREATININE-BSD FRML MDRD: ABNORMAL ML/MIN/{1.73_M2}
GLUCOSE BLD-MCNC: 86 MG/DL (ref 70–99)
HCT VFR BLD CALC: 50.9 % (ref 36.3–47.1)
HEMOGLOBIN: 16.5 G/DL (ref 11.9–15.1)
INR BLD: 1
POTASSIUM SERPL-SCNC: 3.9 MMOL/L (ref 3.7–5.3)
PROTHROMBIN TIME: 11 SEC (ref 9–12)
SODIUM BLD-SCNC: 140 MMOL/L (ref 135–144)

## 2020-02-10 PROCEDURE — 85014 HEMATOCRIT: CPT

## 2020-02-10 PROCEDURE — 36415 COLL VENOUS BLD VENIPUNCTURE: CPT

## 2020-02-10 PROCEDURE — 85018 HEMOGLOBIN: CPT

## 2020-02-10 PROCEDURE — 93005 ELECTROCARDIOGRAM TRACING: CPT | Performed by: SURGERY

## 2020-02-10 PROCEDURE — 85610 PROTHROMBIN TIME: CPT

## 2020-02-10 PROCEDURE — 80048 BASIC METABOLIC PNL TOTAL CA: CPT

## 2020-02-10 RX ORDER — AMITRIPTYLINE HYDROCHLORIDE 25 MG/1
25 TABLET, FILM COATED ORAL 3 TIMES DAILY
Status: ON HOLD | COMMUNITY
End: 2020-07-07 | Stop reason: SDUPTHER

## 2020-02-10 RX ORDER — AMLODIPINE BESYLATE 10 MG/1
15 TABLET ORAL NIGHTLY
Status: ON HOLD | COMMUNITY
End: 2020-04-06 | Stop reason: HOSPADM

## 2020-02-10 RX ORDER — FAMOTIDINE 10 MG
10 TABLET ORAL NIGHTLY
Status: ON HOLD | COMMUNITY
End: 2020-04-06 | Stop reason: HOSPADM

## 2020-02-10 RX ORDER — SODIUM CHLORIDE, SODIUM LACTATE, POTASSIUM CHLORIDE, CALCIUM CHLORIDE 600; 310; 30; 20 MG/100ML; MG/100ML; MG/100ML; MG/100ML
1000 INJECTION, SOLUTION INTRAVENOUS CONTINUOUS
Status: CANCELLED | OUTPATIENT
Start: 2020-02-10

## 2020-02-10 ASSESSMENT — PAIN DESCRIPTION - DESCRIPTORS: DESCRIPTORS: BURNING;SQUEEZING

## 2020-02-10 ASSESSMENT — PAIN DESCRIPTION - PROGRESSION: CLINICAL_PROGRESSION: GRADUALLY WORSENING

## 2020-02-10 ASSESSMENT — PAIN DESCRIPTION - LOCATION: LOCATION: CHEST;THROAT

## 2020-02-10 ASSESSMENT — PAIN SCALES - GENERAL: PAINLEVEL_OUTOF10: 8

## 2020-02-10 ASSESSMENT — PAIN DESCRIPTION - FREQUENCY: FREQUENCY: CONTINUOUS

## 2020-02-10 ASSESSMENT — PAIN DESCRIPTION - ORIENTATION: ORIENTATION: ANTERIOR

## 2020-02-10 ASSESSMENT — PAIN DESCRIPTION - PAIN TYPE: TYPE: CHRONIC PAIN

## 2020-02-10 ASSESSMENT — PAIN DESCRIPTION - ONSET: ONSET: ON-GOING

## 2020-02-10 NOTE — H&P (VIEW-ONLY)
History and Physical    Pt Name: Nelson Reilly  MRN: [id number]  YOB: 1945  Date of evaluation: 2/10/2020  Primary Care Physician: Jean Calvin MD  Patient evaluated at the request of  Dr. Shawn Pascual    Reason for evaluation:   Hiatal hernia, dysphagia , GERD  SUBJECTIVE:   History of Chief Complaint:   According to Dr. Shawn Pascual note in Feb/2020    Oralia Dill is a 76 y.o. female , The patient is a 76 y.o. female being seen regarding dysphagia which is worsening, a known hiatal hernia and possible achalasia. The patient's PCP is Soni Willoughby MD .  Patient is seen with her son today. She reports worsening heartburn, vomiting, and epigastric discomfort over the last several months. She reports struggling with GERD for a long time now. At some point she was diagnosed with achalasia. Review of her medical records does show barium upper GIs which confirm severe esophageal dilatation with tertiary contractions. She reports that she had manometry but I am unable to find this in any of her records. She does report that she can tolerate most foods although the vomiting does happen on a daily basis. She does not vomit everything that she eats. She continues to smoke 1/2 pack a day. Past Medical History      has a past medical history of Anxiety, Arthritis, Back pain, Bronchitis, Caffeine use, Carpal tunnel syndrome, Cataracts, bilateral, Constipation, Depression, Diarrhea, GERD (gastroesophageal reflux disease), Hyperlipidemia, Mumps, MVP (mitral valve prolapse), Recurrent UTI, Sinusitis, and Ulcerative esophagitis. Past Surgical History   has a past surgical history that includes Hysterectomy; total nephrectomy; Tonsillectomy; Appendectomy; Cystoscopy; Ovary removal; Tubal ligation; rhinoplasty; Carpal tunnel release; Hand surgery; and Total hip arthroplasty.        Medications   Scheduled Meds:  Current Outpatient Rx   Medication Sig Dispense Refill    Oyster Shell 500 MG TABS Take 500 mg by mouth 2 times daily       phenazopyridine (PYRIDIUM) 200 MG tablet Take 1 tablet by mouth 3 times daily as needed for Pain (bladder spasm/pain) 15 tablet 0    clonazePAM (KLONOPIN) 0.5 MG tablet Take 0.5 mg by mouth 2 times daily as needed.  busPIRone (BUSPAR) 7.5 MG tablet Take 7.5 mg by mouth 3 times daily      ondansetron (ZOFRAN) 4 MG tablet Take 4 mg by mouth every 6 hours as needed for Nausea or Vomiting      polyethylene glycol (MIRALAX) PACK packet Take 17 g by mouth daily as needed      Acetaminophen 500 MG CAPS Take 1 tablet by mouth as needed for Pain (follow OTC instructions)      calcium carbonate (TUMS) 500 MG chewable tablet Take 1 tablet by mouth 3 times daily as needed for Heartburn      escitalopram (LEXAPRO) 10 MG tablet Take 1 tablet by mouth daily 30 tablet 0    pantoprazole (PROTONIX) 40 MG tablet Take 40 mg by mouth 2 times daily   0    metoprolol succinate (TOPROL XL) 25 MG extended release tablet take 1 tablet by mouth once daily  0    gabapentin (NEURONTIN) 300 MG capsule Take 300 mg by mouth 3 times daily.  simvastatin (ZOCOR) 40 MG tablet Take 40 mg by mouth nightly.  traZODone (DESYREL) 150 MG tablet Take 100 mg by mouth nightly        Continuous Infusions:  PRN Meds:. Allergies  is allergic to compazine [prochlorperazine maleate]; penicillin v potassium; and penicillins. Family History    family history includes Cancer in her mother; Heart Attack in her father and mother. Family Status   Relation Name Status    Mother      Father           Social History  Social History     Socioeconomic History    Marital status:       Spouse name: Not on file    Number of children: 2    Years of education: Not on file    Highest education level: Not on file   Occupational History    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   Social Needs    Financial resource strain: Not on file    Food insecurity:     Worry: Not on file     Inability: Not on file   [place] needs:     Medical: Not on file     Non-medical: Not on file   Tobacco Use    Smoking status: Current Every Day Smoker     Packs/day: 1.00     Years: 50.00     Pack years: 50.00     Types: Cigarettes    Smokeless tobacco: Never Used   Substance and Sexual Activity    Alcohol use: No    Drug use: No    Sexual activity: Never   Lifestyle    Physical activity:     Days per week: Not on file     Minutes per session: Not on file    Stress: Not on file   Relationships    Social connections:     Talks on phone: Not on file     Gets together: Not on file     Attends Hindu service: Not on file     Active member of club or organization: Not on file     Attends meetings of clubs or organizations: Not on file     Relationship status: Not on file    Intimate partner violence:     Fear of current or ex partner: Not on file     Emotionally abused: Not on file     Physically abused: Not on file     Forced sexual activity: Not on file   Other Topics Concern    Not on file   Social History Narrative    Not on file               Hobbies:  Great grandmother , brain games     OBJECTIVE:   VITALS:  vitals were not taken for this visit. CONSTITUTIONAL: Alert and oriented times 3, no acute distress and cooperative to examination. friendly and pleasant     SKIN: rash No    HEENT: Head is normocephalic, atraumatic. EOMI, PERRLA    Oral air way :slightly narrow Yes    NECK: neck supple, no lymphadenopathy noted, trachea midline and straight       2+ carotid, no bruit    LUNGS: Chest expands equally bilaterally upon respiration, no accessory muscle used. Ausculation reveals no adventitious breath sounds. CARDIOVASCULAR: \"Heart sounds are normal.  Regular rate and rhythm without murmur,    ABDOMEN: Bowel sounds are present in all four quadrants      GENATALIA:Deferred. NEUROLOGIC: \"CN II-XII are grossly intact.        EXTREMITIES: Pitting edema:  No,  Varicose veins: No     [place]

## 2020-02-10 NOTE — ANESTHESIA PRE-OP
Anesthesia Focused Assessment    STOP-BANG Sleep Apnea Questionnaire    Obstructive Sleep Apnea:                                                                 No     Machine used:                                                                                  No            SNORE loudly (heard through closed doors)? No      TIRED, fatigued, sleepy during daytime? No      OBSERVED stopping breathing during sleep? No      High blood PRESSURE being treated? Yes      BMI over 35? No  AGE over 48? Yes  NECK circumference over 16\"? No  GENDER (male)? No                High risk 5-8  Intermediate risk 3-4  Low risk 0-2    Total 3  High risk 5-8  Intermediate risk 3-4  Low risk 0-2      Type 1 DM:                                                                                        No    TYPE 2:                                                                                             No    If yes, insulin dependent? No    Coronary Artery Disease :                                                                No        Active smoker                                                                                   Yes    Drinks Alcohol                                                                                   No    Dentition: Dentures                                                                            No              Partial                                                                                                 Yes    Any loose or missing teeth                                                                 No    Defib / AICD / Pacemaker:                                                               No    Renal Failure: No    If Yes, On dialysis?       Hx of anesthesia complications with Patient                               No    Hx of anesthesia complications with family

## 2020-02-10 NOTE — H&P
History and Physical    Pt Name: Anjum Goff  MRN: [id number]  YOB: 1945  Date of evaluation: 2/10/2020  Primary Care Physician: Sera Belle MD  Patient evaluated at the request of  Dr. Jeremiah Alex    Reason for evaluation:   Hiatal hernia, dysphagia , GERD  SUBJECTIVE:   History of Chief Complaint:   According to Dr. Jeremiah Alex note in Feb/2020    Paris Márquez is a 76 y.o. female , The patient is a 76 y.o. female being seen regarding dysphagia which is worsening, a known hiatal hernia and possible achalasia. The patient's PCP is Felicita Dewey MD .  Patient is seen with her son today. She reports worsening heartburn, vomiting, and epigastric discomfort over the last several months. She reports struggling with GERD for a long time now. At some point she was diagnosed with achalasia. Review of her medical records does show barium upper GIs which confirm severe esophageal dilatation with tertiary contractions. She reports that she had manometry but I am unable to find this in any of her records. She does report that she can tolerate most foods although the vomiting does happen on a daily basis. She does not vomit everything that she eats. She continues to smoke 1/2 pack a day. Past Medical History      has a past medical history of Anxiety, Arthritis, Back pain, Bronchitis, Caffeine use, Carpal tunnel syndrome, Cataracts, bilateral, Constipation, Depression, Diarrhea, GERD (gastroesophageal reflux disease), Hyperlipidemia, Mumps, MVP (mitral valve prolapse), Recurrent UTI, Sinusitis, and Ulcerative esophagitis. Past Surgical History   has a past surgical history that includes Hysterectomy; total nephrectomy; Tonsillectomy; Appendectomy; Cystoscopy; Ovary removal; Tubal ligation; rhinoplasty; Carpal tunnel release; Hand surgery; and Total hip arthroplasty.        Medications   Scheduled Meds:  Current Outpatient Rx   Medication Sig Dispense Refill    Oyster Shell 500 MG TABS

## 2020-02-11 LAB
EKG ATRIAL RATE: 80 BPM
EKG P AXIS: 44 DEGREES
EKG P-R INTERVAL: 146 MS
EKG Q-T INTERVAL: 360 MS
EKG QRS DURATION: 82 MS
EKG QTC CALCULATION (BAZETT): 415 MS
EKG R AXIS: 56 DEGREES
EKG T AXIS: 32 DEGREES
EKG VENTRICULAR RATE: 80 BPM

## 2020-02-23 ENCOUNTER — ANESTHESIA EVENT (OUTPATIENT)
Dept: OPERATING ROOM | Age: 75
DRG: 165 | End: 2020-02-23
Payer: MEDICARE

## 2020-02-24 ENCOUNTER — ANESTHESIA (OUTPATIENT)
Dept: OPERATING ROOM | Age: 75
DRG: 165 | End: 2020-02-24
Payer: MEDICARE

## 2020-02-24 ENCOUNTER — HOSPITAL ENCOUNTER (INPATIENT)
Age: 75
LOS: 1 days | Discharge: HOME HEALTH | DRG: 165 | End: 2020-02-28
Attending: SURGERY | Admitting: SURGERY
Payer: MEDICARE

## 2020-02-24 VITALS — OXYGEN SATURATION: 96 % | TEMPERATURE: 96.9 F | DIASTOLIC BLOOD PRESSURE: 68 MMHG | SYSTOLIC BLOOD PRESSURE: 124 MMHG

## 2020-02-24 PROBLEM — K44.9 PARAESOPHAGEAL HERNIA: Status: ACTIVE | Noted: 2020-02-24

## 2020-02-24 PROCEDURE — 43281 LAP PARAESOPHAG HERN REPAIR: CPT | Performed by: SURGERY

## 2020-02-24 PROCEDURE — 2580000003 HC RX 258: Performed by: SURGERY

## 2020-02-24 PROCEDURE — 6360000002 HC RX W HCPCS: Performed by: SURGERY

## 2020-02-24 PROCEDURE — 2500000003 HC RX 250 WO HCPCS: Performed by: NURSE ANESTHETIST, CERTIFIED REGISTERED

## 2020-02-24 PROCEDURE — 0DV44ZZ RESTRICTION OF ESOPHAGOGASTRIC JUNCTION, PERCUTANEOUS ENDOSCOPIC APPROACH: ICD-10-PCS | Performed by: SURGERY

## 2020-02-24 PROCEDURE — 2500000003 HC RX 250 WO HCPCS: Performed by: SURGERY

## 2020-02-24 PROCEDURE — 2709999900 HC NON-CHARGEABLE SUPPLY: Performed by: SURGERY

## 2020-02-24 PROCEDURE — 51701 INSERT BLADDER CATHETER: CPT

## 2020-02-24 PROCEDURE — 6370000000 HC RX 637 (ALT 250 FOR IP): Performed by: SURGERY

## 2020-02-24 PROCEDURE — 3600000019 HC SURGERY ROBOT ADDTL 15MIN: Performed by: SURGERY

## 2020-02-24 PROCEDURE — G0378 HOSPITAL OBSERVATION PER HR: HCPCS

## 2020-02-24 PROCEDURE — 3600000009 HC SURGERY ROBOT BASE: Performed by: SURGERY

## 2020-02-24 PROCEDURE — 7100000000 HC PACU RECOVERY - FIRST 15 MIN: Performed by: SURGERY

## 2020-02-24 PROCEDURE — 2580000003 HC RX 258: Performed by: NURSE ANESTHETIST, CERTIFIED REGISTERED

## 2020-02-24 PROCEDURE — S2900 ROBOTIC SURGICAL SYSTEM: HCPCS | Performed by: SURGERY

## 2020-02-24 PROCEDURE — 2720000010 HC SURG SUPPLY STERILE: Performed by: SURGERY

## 2020-02-24 PROCEDURE — 8E0W4CZ ROBOTIC ASSISTED PROCEDURE OF TRUNK REGION, PERCUTANEOUS ENDOSCOPIC APPROACH: ICD-10-PCS | Performed by: SURGERY

## 2020-02-24 PROCEDURE — 3700000000 HC ANESTHESIA ATTENDED CARE: Performed by: SURGERY

## 2020-02-24 PROCEDURE — 6360000002 HC RX W HCPCS

## 2020-02-24 PROCEDURE — 6360000002 HC RX W HCPCS: Performed by: NURSE ANESTHETIST, CERTIFIED REGISTERED

## 2020-02-24 PROCEDURE — 3700000001 HC ADD 15 MINUTES (ANESTHESIA): Performed by: SURGERY

## 2020-02-24 PROCEDURE — 0BQT4ZZ REPAIR DIAPHRAGM, PERCUTANEOUS ENDOSCOPIC APPROACH: ICD-10-PCS | Performed by: SURGERY

## 2020-02-24 PROCEDURE — 51798 US URINE CAPACITY MEASURE: CPT

## 2020-02-24 PROCEDURE — 7100000001 HC PACU RECOVERY - ADDTL 15 MIN: Performed by: SURGERY

## 2020-02-24 RX ORDER — SODIUM CHLORIDE 0.9 % (FLUSH) 0.9 %
10 SYRINGE (ML) INJECTION PRN
Status: DISCONTINUED | OUTPATIENT
Start: 2020-02-24 | End: 2020-02-28 | Stop reason: HOSPADM

## 2020-02-24 RX ORDER — NICOTINE 21 MG/24HR
1 PATCH, TRANSDERMAL 24 HOURS TRANSDERMAL DAILY
Status: DISCONTINUED | OUTPATIENT
Start: 2020-02-24 | End: 2020-02-28 | Stop reason: HOSPADM

## 2020-02-24 RX ORDER — GLYCOPYRROLATE 1 MG/5 ML
SYRINGE (ML) INTRAVENOUS PRN
Status: DISCONTINUED | OUTPATIENT
Start: 2020-02-24 | End: 2020-02-24 | Stop reason: SDUPTHER

## 2020-02-24 RX ORDER — HEPARIN SODIUM 5000 [USP'U]/ML
5000 INJECTION, SOLUTION INTRAVENOUS; SUBCUTANEOUS
Status: COMPLETED | OUTPATIENT
Start: 2020-02-24 | End: 2020-02-24

## 2020-02-24 RX ORDER — SODIUM CHLORIDE, SODIUM LACTATE, POTASSIUM CHLORIDE, AND CALCIUM CHLORIDE .6; .31; .03; .02 G/100ML; G/100ML; G/100ML; G/100ML
500 INJECTION, SOLUTION INTRAVENOUS ONCE
Status: DISCONTINUED | OUTPATIENT
Start: 2020-02-24 | End: 2020-02-28 | Stop reason: HOSPADM

## 2020-02-24 RX ORDER — LIDOCAINE HYDROCHLORIDE 10 MG/ML
INJECTION, SOLUTION EPIDURAL; INFILTRATION; INTRACAUDAL; PERINEURAL PRN
Status: DISCONTINUED | OUTPATIENT
Start: 2020-02-24 | End: 2020-02-24 | Stop reason: SDUPTHER

## 2020-02-24 RX ORDER — CLONAZEPAM 0.5 MG/1
0.5 TABLET ORAL 2 TIMES DAILY
Status: DISCONTINUED | OUTPATIENT
Start: 2020-02-24 | End: 2020-02-28 | Stop reason: HOSPADM

## 2020-02-24 RX ORDER — SODIUM CHLORIDE, SODIUM LACTATE, POTASSIUM CHLORIDE, CALCIUM CHLORIDE 600; 310; 30; 20 MG/100ML; MG/100ML; MG/100ML; MG/100ML
1000 INJECTION, SOLUTION INTRAVENOUS CONTINUOUS
Status: DISCONTINUED | OUTPATIENT
Start: 2020-02-24 | End: 2020-02-24

## 2020-02-24 RX ORDER — SODIUM CHLORIDE, SODIUM LACTATE, POTASSIUM CHLORIDE, CALCIUM CHLORIDE 600; 310; 30; 20 MG/100ML; MG/100ML; MG/100ML; MG/100ML
INJECTION, SOLUTION INTRAVENOUS CONTINUOUS
Status: DISCONTINUED | OUTPATIENT
Start: 2020-02-24 | End: 2020-02-28 | Stop reason: HOSPADM

## 2020-02-24 RX ORDER — MAGNESIUM HYDROXIDE 1200 MG/15ML
LIQUID ORAL CONTINUOUS PRN
Status: COMPLETED | OUTPATIENT
Start: 2020-02-24 | End: 2020-02-24

## 2020-02-24 RX ORDER — ESCITALOPRAM OXALATE 10 MG/1
10 TABLET ORAL DAILY
Status: DISCONTINUED | OUTPATIENT
Start: 2020-02-25 | End: 2020-02-28 | Stop reason: HOSPADM

## 2020-02-24 RX ORDER — PROPOFOL 10 MG/ML
INJECTION, EMULSION INTRAVENOUS PRN
Status: DISCONTINUED | OUTPATIENT
Start: 2020-02-24 | End: 2020-02-24 | Stop reason: SDUPTHER

## 2020-02-24 RX ORDER — SIMETHICONE 80 MG
80 TABLET,CHEWABLE ORAL EVERY 6 HOURS PRN
Status: DISCONTINUED | OUTPATIENT
Start: 2020-02-24 | End: 2020-02-28 | Stop reason: HOSPADM

## 2020-02-24 RX ORDER — PANTOPRAZOLE SODIUM 40 MG/1
40 TABLET, DELAYED RELEASE ORAL DAILY
Status: DISCONTINUED | OUTPATIENT
Start: 2020-02-25 | End: 2020-02-28 | Stop reason: HOSPADM

## 2020-02-24 RX ORDER — AMITRIPTYLINE HYDROCHLORIDE 10 MG/1
10 TABLET, FILM COATED ORAL 3 TIMES DAILY
Status: DISCONTINUED | OUTPATIENT
Start: 2020-02-24 | End: 2020-02-28 | Stop reason: HOSPADM

## 2020-02-24 RX ORDER — BUSPIRONE HYDROCHLORIDE 5 MG/1
7.5 TABLET ORAL 3 TIMES DAILY
Status: DISCONTINUED | OUTPATIENT
Start: 2020-02-24 | End: 2020-02-28 | Stop reason: HOSPADM

## 2020-02-24 RX ORDER — GABAPENTIN 300 MG/1
300 CAPSULE ORAL 3 TIMES DAILY
Status: DISCONTINUED | OUTPATIENT
Start: 2020-02-24 | End: 2020-02-28 | Stop reason: HOSPADM

## 2020-02-24 RX ORDER — BUPIVACAINE HYDROCHLORIDE AND EPINEPHRINE 5; 5 MG/ML; UG/ML
INJECTION, SOLUTION PERINEURAL PRN
Status: DISCONTINUED | OUTPATIENT
Start: 2020-02-24 | End: 2020-02-24 | Stop reason: ALTCHOICE

## 2020-02-24 RX ORDER — KETOROLAC TROMETHAMINE 30 MG/ML
INJECTION, SOLUTION INTRAMUSCULAR; INTRAVENOUS PRN
Status: DISCONTINUED | OUTPATIENT
Start: 2020-02-24 | End: 2020-02-24 | Stop reason: SDUPTHER

## 2020-02-24 RX ORDER — METOPROLOL SUCCINATE 25 MG/1
25 TABLET, EXTENDED RELEASE ORAL DAILY
Status: DISCONTINUED | OUTPATIENT
Start: 2020-02-25 | End: 2020-02-28 | Stop reason: HOSPADM

## 2020-02-24 RX ORDER — AMLODIPINE BESYLATE 10 MG/1
10 TABLET ORAL NIGHTLY
Status: DISCONTINUED | OUTPATIENT
Start: 2020-02-24 | End: 2020-02-28 | Stop reason: HOSPADM

## 2020-02-24 RX ORDER — ONDANSETRON 2 MG/ML
INJECTION INTRAMUSCULAR; INTRAVENOUS PRN
Status: DISCONTINUED | OUTPATIENT
Start: 2020-02-24 | End: 2020-02-24 | Stop reason: SDUPTHER

## 2020-02-24 RX ORDER — DEXAMETHASONE SODIUM PHOSPHATE 10 MG/ML
INJECTION INTRAMUSCULAR; INTRAVENOUS PRN
Status: DISCONTINUED | OUTPATIENT
Start: 2020-02-24 | End: 2020-02-24 | Stop reason: SDUPTHER

## 2020-02-24 RX ORDER — TRAZODONE HYDROCHLORIDE 100 MG/1
100 TABLET ORAL NIGHTLY
Status: DISCONTINUED | OUTPATIENT
Start: 2020-02-24 | End: 2020-02-28 | Stop reason: HOSPADM

## 2020-02-24 RX ORDER — OXYCODONE HYDROCHLORIDE 5 MG/1
5 TABLET ORAL EVERY 4 HOURS PRN
Status: DISCONTINUED | OUTPATIENT
Start: 2020-02-24 | End: 2020-02-28 | Stop reason: HOSPADM

## 2020-02-24 RX ORDER — SODIUM CHLORIDE, SODIUM LACTATE, POTASSIUM CHLORIDE, CALCIUM CHLORIDE 600; 310; 30; 20 MG/100ML; MG/100ML; MG/100ML; MG/100ML
INJECTION, SOLUTION INTRAVENOUS CONTINUOUS PRN
Status: DISCONTINUED | OUTPATIENT
Start: 2020-02-24 | End: 2020-02-24 | Stop reason: SDUPTHER

## 2020-02-24 RX ORDER — ONDANSETRON 2 MG/ML
4 INJECTION INTRAMUSCULAR; INTRAVENOUS EVERY 6 HOURS PRN
Status: DISCONTINUED | OUTPATIENT
Start: 2020-02-24 | End: 2020-02-28 | Stop reason: HOSPADM

## 2020-02-24 RX ORDER — GLUCOSAMINE/CHONDR SU A SOD 750-600 MG
TABLET ORAL
Status: ON HOLD | COMMUNITY
Start: 2020-02-14 | End: 2021-12-19 | Stop reason: HOSPADM

## 2020-02-24 RX ORDER — PHENYLEPHRINE HYDROCHLORIDE 10 MG/ML
INJECTION INTRAVENOUS PRN
Status: DISCONTINUED | OUTPATIENT
Start: 2020-02-24 | End: 2020-02-24 | Stop reason: SDUPTHER

## 2020-02-24 RX ORDER — KETOROLAC TROMETHAMINE 15 MG/ML
15 INJECTION, SOLUTION INTRAMUSCULAR; INTRAVENOUS EVERY 6 HOURS PRN
Status: DISCONTINUED | OUTPATIENT
Start: 2020-02-24 | End: 2020-02-28 | Stop reason: HOSPADM

## 2020-02-24 RX ORDER — FENTANYL CITRATE 50 UG/ML
INJECTION, SOLUTION INTRAMUSCULAR; INTRAVENOUS PRN
Status: DISCONTINUED | OUTPATIENT
Start: 2020-02-24 | End: 2020-02-24 | Stop reason: SDUPTHER

## 2020-02-24 RX ORDER — ROCURONIUM BROMIDE 10 MG/ML
INJECTION, SOLUTION INTRAVENOUS PRN
Status: DISCONTINUED | OUTPATIENT
Start: 2020-02-24 | End: 2020-02-24 | Stop reason: SDUPTHER

## 2020-02-24 RX ORDER — HEPARIN SODIUM 5000 [USP'U]/ML
INJECTION, SOLUTION INTRAVENOUS; SUBCUTANEOUS
Status: COMPLETED
Start: 2020-02-24 | End: 2020-02-24

## 2020-02-24 RX ORDER — VANCOMYCIN HYDROCHLORIDE 1 G/200ML
1000 INJECTION, SOLUTION INTRAVENOUS EVERY 12 HOURS
Status: COMPLETED | OUTPATIENT
Start: 2020-02-24 | End: 2020-02-24

## 2020-02-24 RX ORDER — SODIUM CHLORIDE 0.9 % (FLUSH) 0.9 %
10 SYRINGE (ML) INJECTION EVERY 12 HOURS SCHEDULED
Status: DISCONTINUED | OUTPATIENT
Start: 2020-02-24 | End: 2020-02-28 | Stop reason: HOSPADM

## 2020-02-24 RX ORDER — SODIUM CHLORIDE, SODIUM LACTATE, POTASSIUM CHLORIDE, CALCIUM CHLORIDE 600; 310; 30; 20 MG/100ML; MG/100ML; MG/100ML; MG/100ML
INJECTION, SOLUTION INTRAVENOUS CONTINUOUS
Status: DISCONTINUED | OUTPATIENT
Start: 2020-02-24 | End: 2020-02-24

## 2020-02-24 RX ORDER — NEOSTIGMINE METHYLSULFATE 5 MG/5 ML
SYRINGE (ML) INTRAVENOUS PRN
Status: DISCONTINUED | OUTPATIENT
Start: 2020-02-24 | End: 2020-02-24 | Stop reason: SDUPTHER

## 2020-02-24 RX ADMIN — GABAPENTIN 300 MG: 300 CAPSULE ORAL at 20:39

## 2020-02-24 RX ADMIN — DEXAMETHASONE SODIUM PHOSPHATE 10 MG: 10 INJECTION INTRAMUSCULAR; INTRAVENOUS at 07:29

## 2020-02-24 RX ADMIN — AMLODIPINE BESYLATE 10 MG: 10 TABLET ORAL at 20:39

## 2020-02-24 RX ADMIN — ONDANSETRON 4 MG: 2 INJECTION, SOLUTION INTRAMUSCULAR; INTRAVENOUS at 09:23

## 2020-02-24 RX ADMIN — PHENYLEPHRINE HYDROCHLORIDE 100 MCG: 10 INJECTION INTRAVENOUS at 07:37

## 2020-02-24 RX ADMIN — Medication 0.6 MG: at 09:23

## 2020-02-24 RX ADMIN — BUSPIRONE HYDROCHLORIDE 7.5 MG: 5 TABLET ORAL at 20:39

## 2020-02-24 RX ADMIN — ROCURONIUM BROMIDE 50 MG: 10 INJECTION INTRAVENOUS at 07:15

## 2020-02-24 RX ADMIN — LIDOCAINE HYDROCHLORIDE 50 MG: 10 INJECTION, SOLUTION EPIDURAL; INFILTRATION; INTRACAUDAL; PERINEURAL at 07:15

## 2020-02-24 RX ADMIN — OXYCODONE HYDROCHLORIDE 5 MG: 5 TABLET ORAL at 17:13

## 2020-02-24 RX ADMIN — HEPARIN SODIUM 5000 UNITS: 5000 INJECTION, SOLUTION INTRAVENOUS; SUBCUTANEOUS at 06:55

## 2020-02-24 RX ADMIN — OXYCODONE HYDROCHLORIDE 5 MG: 5 TABLET ORAL at 13:05

## 2020-02-24 RX ADMIN — KETOROLAC TROMETHAMINE 15 MG: 15 INJECTION, SOLUTION INTRAMUSCULAR; INTRAVENOUS at 20:39

## 2020-02-24 RX ADMIN — HEPARIN SODIUM 5000 UNITS: 5000 INJECTION INTRAVENOUS; SUBCUTANEOUS at 06:55

## 2020-02-24 RX ADMIN — TRAZODONE HYDROCHLORIDE 100 MG: 100 TABLET ORAL at 20:39

## 2020-02-24 RX ADMIN — VANCOMYCIN HYDROCHLORIDE 1000 MG: 1 INJECTION, SOLUTION INTRAVENOUS at 20:38

## 2020-02-24 RX ADMIN — CLONAZEPAM 0.5 MG: 0.5 TABLET ORAL at 20:38

## 2020-02-24 RX ADMIN — ROCURONIUM BROMIDE 10 MG: 10 INJECTION INTRAVENOUS at 08:32

## 2020-02-24 RX ADMIN — SODIUM CHLORIDE, POTASSIUM CHLORIDE, SODIUM LACTATE AND CALCIUM CHLORIDE: 600; 310; 30; 20 INJECTION, SOLUTION INTRAVENOUS at 12:04

## 2020-02-24 RX ADMIN — OXYCODONE HYDROCHLORIDE 5 MG: 5 TABLET ORAL at 20:38

## 2020-02-24 RX ADMIN — SODIUM CHLORIDE, POTASSIUM CHLORIDE, SODIUM LACTATE AND CALCIUM CHLORIDE: 600; 310; 30; 20 INJECTION, SOLUTION INTRAVENOUS at 07:09

## 2020-02-24 RX ADMIN — GABAPENTIN 300 MG: 300 CAPSULE ORAL at 12:06

## 2020-02-24 RX ADMIN — Medication 3 MG: at 09:23

## 2020-02-24 RX ADMIN — AMITRIPTYLINE HYDROCHLORIDE 10 MG: 10 TABLET, FILM COATED ORAL at 20:39

## 2020-02-24 RX ADMIN — VANCOMYCIN HYDROCHLORIDE 1250 MG: 10 INJECTION, POWDER, LYOPHILIZED, FOR SOLUTION INTRAVENOUS at 07:24

## 2020-02-24 RX ADMIN — FENTANYL CITRATE 100 MCG: 50 INJECTION INTRAMUSCULAR; INTRAVENOUS at 07:15

## 2020-02-24 RX ADMIN — BUSPIRONE HYDROCHLORIDE 7.5 MG: 5 TABLET ORAL at 12:07

## 2020-02-24 RX ADMIN — PROPOFOL 200 MG: 10 INJECTION, EMULSION INTRAVENOUS at 07:15

## 2020-02-24 RX ADMIN — KETOROLAC TROMETHAMINE 30 MG: 30 INJECTION, SOLUTION INTRAMUSCULAR at 09:24

## 2020-02-24 RX ADMIN — AMITRIPTYLINE HYDROCHLORIDE 10 MG: 10 TABLET, FILM COATED ORAL at 12:07

## 2020-02-24 ASSESSMENT — PULMONARY FUNCTION TESTS
PIF_VALUE: 24
PIF_VALUE: 23
PIF_VALUE: 17
PIF_VALUE: 21
PIF_VALUE: 23
PIF_VALUE: 1
PIF_VALUE: 23
PIF_VALUE: 19
PIF_VALUE: 19
PIF_VALUE: 24
PIF_VALUE: 23
PIF_VALUE: 24
PIF_VALUE: 23
PIF_VALUE: 23
PIF_VALUE: 24
PIF_VALUE: 24
PIF_VALUE: 22
PIF_VALUE: 23
PIF_VALUE: 16
PIF_VALUE: 24
PIF_VALUE: 23
PIF_VALUE: 24
PIF_VALUE: 17
PIF_VALUE: 23
PIF_VALUE: 23
PIF_VALUE: 1
PIF_VALUE: 19
PIF_VALUE: 18
PIF_VALUE: 3
PIF_VALUE: 24
PIF_VALUE: 0
PIF_VALUE: 23
PIF_VALUE: 6
PIF_VALUE: 23
PIF_VALUE: 17
PIF_VALUE: 4
PIF_VALUE: 20
PIF_VALUE: 23
PIF_VALUE: 18
PIF_VALUE: 18
PIF_VALUE: 23
PIF_VALUE: 23
PIF_VALUE: 20
PIF_VALUE: 22
PIF_VALUE: 23
PIF_VALUE: 24
PIF_VALUE: 23
PIF_VALUE: 24
PIF_VALUE: 23
PIF_VALUE: 19
PIF_VALUE: 18
PIF_VALUE: 21
PIF_VALUE: 17
PIF_VALUE: 25
PIF_VALUE: 23
PIF_VALUE: 21
PIF_VALUE: 1
PIF_VALUE: 1
PIF_VALUE: 23
PIF_VALUE: 24
PIF_VALUE: 19
PIF_VALUE: 23
PIF_VALUE: 21
PIF_VALUE: 23
PIF_VALUE: 15
PIF_VALUE: 22
PIF_VALUE: 23
PIF_VALUE: 15
PIF_VALUE: 23
PIF_VALUE: 24
PIF_VALUE: 23
PIF_VALUE: 24
PIF_VALUE: 23
PIF_VALUE: 21
PIF_VALUE: 23
PIF_VALUE: 17
PIF_VALUE: 21
PIF_VALUE: 23
PIF_VALUE: 1
PIF_VALUE: 23
PIF_VALUE: 20
PIF_VALUE: 24
PIF_VALUE: 19
PIF_VALUE: 21
PIF_VALUE: 23
PIF_VALUE: 0
PIF_VALUE: 15
PIF_VALUE: 17
PIF_VALUE: 16
PIF_VALUE: 24
PIF_VALUE: 23
PIF_VALUE: 24
PIF_VALUE: 24
PIF_VALUE: 21
PIF_VALUE: 23
PIF_VALUE: 20
PIF_VALUE: 23
PIF_VALUE: 23
PIF_VALUE: 19
PIF_VALUE: 21
PIF_VALUE: 17
PIF_VALUE: 6
PIF_VALUE: 17
PIF_VALUE: 7
PIF_VALUE: 20
PIF_VALUE: 22
PIF_VALUE: 19
PIF_VALUE: 23
PIF_VALUE: 24
PIF_VALUE: 18
PIF_VALUE: 23
PIF_VALUE: 18
PIF_VALUE: 23
PIF_VALUE: 18
PIF_VALUE: 23
PIF_VALUE: 23
PIF_VALUE: 24
PIF_VALUE: 23
PIF_VALUE: 23
PIF_VALUE: 24
PIF_VALUE: 23

## 2020-02-24 ASSESSMENT — PAIN DESCRIPTION - DESCRIPTORS: DESCRIPTORS: ACHING

## 2020-02-24 ASSESSMENT — PAIN SCALES - GENERAL
PAINLEVEL_OUTOF10: 6
PAINLEVEL_OUTOF10: 7
PAINLEVEL_OUTOF10: 6
PAINLEVEL_OUTOF10: 5
PAINLEVEL_OUTOF10: 7
PAINLEVEL_OUTOF10: 0
PAINLEVEL_OUTOF10: 0

## 2020-02-24 ASSESSMENT — PAIN - FUNCTIONAL ASSESSMENT: PAIN_FUNCTIONAL_ASSESSMENT: ACTIVITIES ARE NOT PREVENTED

## 2020-02-24 ASSESSMENT — PAIN DESCRIPTION - LOCATION: LOCATION: ABDOMEN;CHEST

## 2020-02-24 ASSESSMENT — PAIN DESCRIPTION - PAIN TYPE: TYPE: ACUTE PAIN;SURGICAL PAIN

## 2020-02-24 ASSESSMENT — PAIN DESCRIPTION - FREQUENCY: FREQUENCY: CONTINUOUS

## 2020-02-24 ASSESSMENT — PAIN DESCRIPTION - ORIENTATION: ORIENTATION: MID

## 2020-02-24 ASSESSMENT — PAIN DESCRIPTION - ONSET: ONSET: ON-GOING

## 2020-02-24 ASSESSMENT — PAIN DESCRIPTION - PROGRESSION: CLINICAL_PROGRESSION: GRADUALLY WORSENING

## 2020-02-24 NOTE — ANESTHESIA PRE PROCEDURE
Department of Anesthesiology  Preprocedure Note       Name:  Joseline Rice   Age:  76 y.o.  :  1945                                          MRN:  [id number]         Date:  2020      Surgeon: Luis E Luciano):  Abhijit Mcgowan MD    Procedure: XI LAPAROSCOPIC ROBOTIC HIATAL HERNIA REPAIR, NISSEN FUNDOPLICATION (N/A )    Medications prior to admission:   Prior to Admission medications    Medication Sig Start Date End Date Taking? Authorizing Provider   RA VITAMIN D-3 50 MCG (2000) CAPS take 1 capsule by mouth once daily 20  Yes Historical Provider, MD   famotidine (PEPCID) 10 MG tablet Take 10 mg by mouth nightly   Yes Historical Provider, MD   amitriptyline (ELAVIL) 10 MG tablet Take 10 mg by mouth three times daily   Yes Historical Provider, MD   amLODIPine (NORVASC) 10 MG tablet Take 10 mg by mouth nightly   Yes Historical Provider, MD   Oyster Shell 500 MG TABS Take 500 mg by mouth 2 times daily    Yes Historical Provider, MD   clonazePAM (KLONOPIN) 0.5 MG tablet Take 0.5 mg by mouth 2 times daily. Taking 1/2 tablet BID   Yes Historical Provider, MD   busPIRone (BUSPAR) 7.5 MG tablet Take 7.5 mg by mouth 3 times daily Taking 20 mg TID. Yes Historical Provider, MD   polyethylene glycol (MIRALAX) PACK packet Take 17 g by mouth daily as needed   Yes Historical Provider, MD   calcium carbonate (TUMS) 500 MG chewable tablet Take 1 tablet by mouth 3 times daily as needed for Heartburn   Yes Historical Provider, MD   escitalopram (LEXAPRO) 10 MG tablet Take 1 tablet by mouth daily 18  Yes Sheree Corrales MD   pantoprazole (PROTONIX) 40 MG tablet Take 40 mg by mouth daily  3/10/18  Yes Historical Provider, MD   metoprolol succinate (TOPROL XL) 25 MG extended release tablet take 1 tablet by mouth once daily 17  Yes Historical Provider, MD   gabapentin (NEURONTIN) 300 MG capsule Take 300 mg by mouth 3 times daily.    Yes Historical Provider, MD   simvastatin (ZOCOR) 40 MG tablet Take 40 mg by mouth nightly. Yes Historical Provider, MD   traZODone (DESYREL) 150 MG tablet Take 100 mg by mouth nightly    Yes Historical Provider, MD   ondansetron (ZOFRAN) 4 MG tablet Take 4 mg by mouth every 6 hours as needed for Nausea or Vomiting    Historical Provider, MD   Acetaminophen 500 MG CAPS Take 1 tablet by mouth as needed for Pain (follow OTC instructions)    Historical Provider, MD       Current medications:    Current Facility-Administered Medications   Medication Dose Route Frequency Provider Last Rate Last Dose    heparin (porcine) injection 5,000 Units  5,000 Units Subcutaneous On Call to [address], MD        vancomycin (VANCOCIN) 1250 mg in dextrose 5 % 250 mL IVPB  1,250 mg Intravenous On Call to [address] [facility], MD        lactated ringers infusion 1,000 mL  1,000 mL Intravenous Continuous Alyce Ureña MD        heparin (porcine) 5000 UNIT/ML injection                Allergies:     Allergies   Allergen Reactions    Compazine [Prochlorperazine Maleate]     Penicillin V Potassium     Penicillins Other (See Comments)     shock       Problem List:    Patient Active Problem List   Diagnosis Code    Frequency of urination R35.0    Severe sepsis ([facility] [address] A41.9, R65.20    Back pain M54.9    GERD (gastroesophageal reflux disease) K21.9    MVP (mitral valve prolapse) I34.1    Depression F32.9    Anxiety F41.9    Urine retention R33.9    Acute kidney injury ([facility] [address] N17.9    Esophageal dysphagia R13.10       Past Medical History:        Diagnosis Date    Anxiety     Arthritis     Back pain     Bronchitis     Caffeine use     8 coffee / day    Carpal tunnel syndrome     Cataracts, bilateral     Constipation     Depression     Diarrhea     GERD (gastroesophageal reflux disease)     Hyperlipidemia     Hypertension     Mumps     MVP (mitral valve prolapse)     Dr. Hsu Bending in May 2019    Recurrent UTI     Dr. Duff     Sinusitis     Ulcerative esophagitis     Wellness

## 2020-02-24 NOTE — ANESTHESIA POSTPROCEDURE EVALUATION
Department of Anesthesiology  Postprocedure Note    Patient: Keshia Concepcion  MRN: [id number]  YOB: 1945  Date of evaluation: 2/24/2020  Time:  1:48 PM     Procedure Summary     Date:  02/24/20 Room / Location:  [address]    Anesthesia Start:  7867 Anesthesia Stop:  5670    Procedure:  XI LAPAROSCOPIC ROBOTIC HIATAL HERNIA REPAIR, CHERYL FUNDOPLICATION (N/A ) Diagnosis:  (HIATAL HERNIA)    Surgeon:  Laila Davidson MD Responsible Provider:  Heinz Saint, MD    Anesthesia Type:  general ASA Status:  3          Anesthesia Type: general    Alhaji Phase I: Alhaji Score: 9    Alhaji Phase II:      Last vitals: Reviewed and per EMR flowsheets.        Anesthesia Post Evaluation    Patient location during evaluation: PACU  Patient participation: complete - patient participated  Level of consciousness: awake and alert  Pain score: 5  Airway patency: patent  Nausea & Vomiting: no nausea and no vomiting  Complications: no  Cardiovascular status: hemodynamically stable  Respiratory status: room air  Hydration status: euvolemic

## 2020-02-25 ENCOUNTER — APPOINTMENT (OUTPATIENT)
Dept: GENERAL RADIOLOGY | Age: 75
DRG: 165 | End: 2020-02-25
Attending: SURGERY
Payer: MEDICARE

## 2020-02-25 LAB
ANION GAP SERPL CALCULATED.3IONS-SCNC: 14 MMOL/L (ref 9–17)
BILIRUBIN URINE: NEGATIVE
BUN BLDV-MCNC: 10 MG/DL (ref 8–23)
BUN/CREAT BLD: ABNORMAL (ref 9–20)
CALCIUM SERPL-MCNC: 8.6 MG/DL (ref 8.6–10.4)
CHLORIDE BLD-SCNC: 103 MMOL/L (ref 98–107)
CO2: 21 MMOL/L (ref 20–31)
COLOR: YELLOW
COMMENT UA: NORMAL
CREAT SERPL-MCNC: 1.03 MG/DL (ref 0.5–0.9)
D-DIMER QUANTITATIVE: 1.64 MG/L FEU
EKG ATRIAL RATE: 81 BPM
EKG P AXIS: 43 DEGREES
EKG P-R INTERVAL: 150 MS
EKG Q-T INTERVAL: 342 MS
EKG QRS DURATION: 72 MS
EKG QTC CALCULATION (BAZETT): 397 MS
EKG R AXIS: 27 DEGREES
EKG T AXIS: 52 DEGREES
EKG VENTRICULAR RATE: 81 BPM
GFR AFRICAN AMERICAN: >60 ML/MIN
GFR NON-AFRICAN AMERICAN: 52 ML/MIN
GFR SERPL CREATININE-BSD FRML MDRD: ABNORMAL ML/MIN/{1.73_M2}
GFR SERPL CREATININE-BSD FRML MDRD: ABNORMAL ML/MIN/{1.73_M2}
GLUCOSE BLD-MCNC: 97 MG/DL (ref 70–99)
GLUCOSE URINE: NEGATIVE
HCT VFR BLD CALC: 43.9 % (ref 36.3–47.1)
HEMOGLOBIN: 13.7 G/DL (ref 11.9–15.1)
KETONES, URINE: NEGATIVE
LEUKOCYTE ESTERASE, URINE: NEGATIVE
MCH RBC QN AUTO: 31.9 PG (ref 25.2–33.5)
MCHC RBC AUTO-ENTMCNC: 31.2 G/DL (ref 28.4–34.8)
MCV RBC AUTO: 102.1 FL (ref 82.6–102.9)
NITRITE, URINE: NEGATIVE
NRBC AUTOMATED: 0 PER 100 WBC
PDW BLD-RTO: 13.8 % (ref 11.8–14.4)
PH UA: 6 (ref 5–8)
PLATELET # BLD: 168 K/UL (ref 138–453)
PMV BLD AUTO: 12.2 FL (ref 8.1–13.5)
POTASSIUM SERPL-SCNC: 4 MMOL/L (ref 3.7–5.3)
PROTEIN UA: NEGATIVE
RBC # BLD: 4.3 M/UL (ref 3.95–5.11)
SODIUM BLD-SCNC: 138 MMOL/L (ref 135–144)
SPECIFIC GRAVITY UA: 1.01 (ref 1–1.03)
TROPONIN INTERP: ABNORMAL
TROPONIN T: ABNORMAL NG/ML
TROPONIN, HIGH SENSITIVITY: 16 NG/L (ref 0–14)
TROPONIN, HIGH SENSITIVITY: 17 NG/L (ref 0–14)
TROPONIN, HIGH SENSITIVITY: 18 NG/L (ref 0–14)
TURBIDITY: CLEAR
URINE HGB: NEGATIVE
UROBILINOGEN, URINE: NORMAL
WBC # BLD: 10 K/UL (ref 3.5–11.3)

## 2020-02-25 PROCEDURE — G0378 HOSPITAL OBSERVATION PER HR: HCPCS

## 2020-02-25 PROCEDURE — 51701 INSERT BLADDER CATHETER: CPT

## 2020-02-25 PROCEDURE — 51798 US URINE CAPACITY MEASURE: CPT

## 2020-02-25 PROCEDURE — 6370000000 HC RX 637 (ALT 250 FOR IP): Performed by: SURGERY

## 2020-02-25 PROCEDURE — 93010 ELECTROCARDIOGRAM REPORT: CPT | Performed by: INTERNAL MEDICINE

## 2020-02-25 PROCEDURE — 81003 URINALYSIS AUTO W/O SCOPE: CPT

## 2020-02-25 PROCEDURE — 36415 COLL VENOUS BLD VENIPUNCTURE: CPT

## 2020-02-25 PROCEDURE — 99024 POSTOP FOLLOW-UP VISIT: CPT | Performed by: SURGERY

## 2020-02-25 PROCEDURE — 85379 FIBRIN DEGRADATION QUANT: CPT

## 2020-02-25 PROCEDURE — 71045 X-RAY EXAM CHEST 1 VIEW: CPT

## 2020-02-25 PROCEDURE — 96374 THER/PROPH/DIAG INJ IV PUSH: CPT

## 2020-02-25 PROCEDURE — 96372 THER/PROPH/DIAG INJ SC/IM: CPT

## 2020-02-25 PROCEDURE — 2580000003 HC RX 258: Performed by: SURGERY

## 2020-02-25 PROCEDURE — 84484 ASSAY OF TROPONIN QUANT: CPT

## 2020-02-25 PROCEDURE — 6360000002 HC RX W HCPCS: Performed by: SURGERY

## 2020-02-25 PROCEDURE — 80048 BASIC METABOLIC PNL TOTAL CA: CPT

## 2020-02-25 PROCEDURE — 93005 ELECTROCARDIOGRAM TRACING: CPT | Performed by: SURGERY

## 2020-02-25 PROCEDURE — 85027 COMPLETE CBC AUTOMATED: CPT

## 2020-02-25 RX ORDER — OXYCODONE HYDROCHLORIDE 5 MG/1
5 TABLET ORAL EVERY 6 HOURS PRN
Qty: 28 TABLET | Refills: 0 | Status: ON HOLD | OUTPATIENT
Start: 2020-02-25 | End: 2020-04-06 | Stop reason: HOSPADM

## 2020-02-25 RX ORDER — ONDANSETRON 4 MG/1
4 TABLET, FILM COATED ORAL DAILY PRN
Qty: 30 TABLET | Refills: 0 | Status: ON HOLD | OUTPATIENT
Start: 2020-02-25 | End: 2020-04-06 | Stop reason: HOSPADM

## 2020-02-25 RX ORDER — TAMSULOSIN HYDROCHLORIDE 0.4 MG/1
0.4 CAPSULE ORAL DAILY
Status: DISCONTINUED | OUTPATIENT
Start: 2020-02-25 | End: 2020-02-28 | Stop reason: HOSPADM

## 2020-02-25 RX ORDER — PANTOPRAZOLE SODIUM 40 MG/1
40 TABLET, DELAYED RELEASE ORAL
Qty: 30 TABLET | Refills: 5 | Status: ON HOLD | OUTPATIENT
Start: 2020-02-25 | End: 2020-04-06 | Stop reason: HOSPADM

## 2020-02-25 RX ADMIN — OXYCODONE HYDROCHLORIDE 5 MG: 5 TABLET ORAL at 16:24

## 2020-02-25 RX ADMIN — CLONAZEPAM 0.5 MG: 0.5 TABLET ORAL at 07:42

## 2020-02-25 RX ADMIN — BUSPIRONE HYDROCHLORIDE 7.5 MG: 5 TABLET ORAL at 22:40

## 2020-02-25 RX ADMIN — TAMSULOSIN HYDROCHLORIDE 0.4 MG: 0.4 CAPSULE ORAL at 09:52

## 2020-02-25 RX ADMIN — OXYCODONE HYDROCHLORIDE 5 MG: 5 TABLET ORAL at 00:31

## 2020-02-25 RX ADMIN — ENOXAPARIN SODIUM 40 MG: 40 INJECTION SUBCUTANEOUS at 09:53

## 2020-02-25 RX ADMIN — AMITRIPTYLINE HYDROCHLORIDE 10 MG: 10 TABLET, FILM COATED ORAL at 22:41

## 2020-02-25 RX ADMIN — OXYCODONE HYDROCHLORIDE 5 MG: 5 TABLET ORAL at 11:50

## 2020-02-25 RX ADMIN — GABAPENTIN 300 MG: 300 CAPSULE ORAL at 09:51

## 2020-02-25 RX ADMIN — SODIUM CHLORIDE, POTASSIUM CHLORIDE, SODIUM LACTATE AND CALCIUM CHLORIDE: 600; 310; 30; 20 INJECTION, SOLUTION INTRAVENOUS at 00:31

## 2020-02-25 RX ADMIN — GABAPENTIN 300 MG: 300 CAPSULE ORAL at 22:40

## 2020-02-25 RX ADMIN — BUSPIRONE HYDROCHLORIDE 7.5 MG: 5 TABLET ORAL at 11:54

## 2020-02-25 RX ADMIN — PANTOPRAZOLE SODIUM 40 MG: 40 TABLET, DELAYED RELEASE ORAL at 09:51

## 2020-02-25 RX ADMIN — OXYCODONE HYDROCHLORIDE 5 MG: 5 TABLET ORAL at 22:45

## 2020-02-25 RX ADMIN — KETOROLAC TROMETHAMINE 15 MG: 15 INJECTION, SOLUTION INTRAMUSCULAR; INTRAVENOUS at 05:33

## 2020-02-25 RX ADMIN — ESCITALOPRAM OXALATE 10 MG: 10 TABLET ORAL at 09:51

## 2020-02-25 RX ADMIN — BUSPIRONE HYDROCHLORIDE 7.5 MG: 5 TABLET ORAL at 09:52

## 2020-02-25 RX ADMIN — METOPROLOL SUCCINATE 25 MG: 25 TABLET, FILM COATED, EXTENDED RELEASE ORAL at 09:51

## 2020-02-25 RX ADMIN — OXYCODONE HYDROCHLORIDE 5 MG: 5 TABLET ORAL at 05:33

## 2020-02-25 RX ADMIN — CLONAZEPAM 0.5 MG: 0.5 TABLET ORAL at 22:40

## 2020-02-25 RX ADMIN — AMLODIPINE BESYLATE 10 MG: 10 TABLET ORAL at 22:40

## 2020-02-25 RX ADMIN — GABAPENTIN 300 MG: 300 CAPSULE ORAL at 11:54

## 2020-02-25 RX ADMIN — TRAZODONE HYDROCHLORIDE 100 MG: 100 TABLET ORAL at 22:40

## 2020-02-25 RX ADMIN — AMITRIPTYLINE HYDROCHLORIDE 10 MG: 10 TABLET, FILM COATED ORAL at 07:42

## 2020-02-25 RX ADMIN — AMITRIPTYLINE HYDROCHLORIDE 10 MG: 10 TABLET, FILM COATED ORAL at 11:54

## 2020-02-25 ASSESSMENT — PAIN DESCRIPTION - LOCATION
LOCATION: ABDOMEN;CHEST
LOCATION: CHEST

## 2020-02-25 ASSESSMENT — PAIN DESCRIPTION - DESCRIPTORS
DESCRIPTORS: BURNING;DISCOMFORT
DESCRIPTORS: PRESSURE
DESCRIPTORS: DULL;ACHING;PRESSURE
DESCRIPTORS: PRESSURE

## 2020-02-25 ASSESSMENT — PAIN DESCRIPTION - PAIN TYPE
TYPE: ACUTE PAIN
TYPE: ACUTE PAIN
TYPE: ACUTE PAIN;SURGICAL PAIN
TYPE: ACUTE PAIN

## 2020-02-25 ASSESSMENT — PAIN DESCRIPTION - PROGRESSION
CLINICAL_PROGRESSION: NOT CHANGED

## 2020-02-25 ASSESSMENT — PAIN DESCRIPTION - ORIENTATION
ORIENTATION: MID

## 2020-02-25 ASSESSMENT — PAIN SCALES - GENERAL
PAINLEVEL_OUTOF10: 5
PAINLEVEL_OUTOF10: 7
PAINLEVEL_OUTOF10: 6
PAINLEVEL_OUTOF10: 7
PAINLEVEL_OUTOF10: 6
PAINLEVEL_OUTOF10: 5
PAINLEVEL_OUTOF10: 8

## 2020-02-25 ASSESSMENT — PAIN DESCRIPTION - FREQUENCY
FREQUENCY: CONTINUOUS
FREQUENCY: INTERMITTENT
FREQUENCY: INTERMITTENT
FREQUENCY: CONTINUOUS

## 2020-02-25 ASSESSMENT — PAIN DESCRIPTION - ONSET
ONSET: ON-GOING
ONSET: SUDDEN
ONSET: ON-GOING
ONSET: GRADUAL

## 2020-02-25 NOTE — CONSULTS
tablet by mouth every morning (before breakfast) 2/25/20  Yes Patricia Earl,    RA VITAMIN D-3 50 MCG (2000 UT) CAPS take 1 capsule by mouth once daily 2/14/20  Yes Historical Provider, MD   famotidine (PEPCID) 10 MG tablet Take 10 mg by mouth nightly   Yes Historical Provider, MD   amitriptyline (ELAVIL) 10 MG tablet Take 10 mg by mouth three times daily   Yes Historical Provider, MD   amLODIPine (NORVASC) 10 MG tablet Take 10 mg by mouth nightly   Yes Historical Provider, MD   Oyster Shell 500 MG TABS Take 500 mg by mouth 2 times daily    Yes Historical Provider, MD   clonazePAM (KLONOPIN) 0.5 MG tablet Take 0.5 mg by mouth 2 times daily. Taking 1/2 tablet BID   Yes Historical Provider, MD   busPIRone (BUSPAR) 7.5 MG tablet Take 7.5 mg by mouth 3 times daily Taking 20 mg TID. Yes Historical Provider, MD   polyethylene glycol (MIRALAX) PACK packet Take 17 g by mouth daily as needed   Yes Historical Provider, MD   calcium carbonate (TUMS) 500 MG chewable tablet Take 1 tablet by mouth 3 times daily as needed for Heartburn   Yes Historical Provider, MD   escitalopram (LEXAPRO) 10 MG tablet Take 1 tablet by mouth daily 5/4/18  Yes Sabi Villegas MD   metoprolol succinate (TOPROL XL) 25 MG extended release tablet take 1 tablet by mouth once daily 9/30/17  Yes Historical Provider, MD   gabapentin (NEURONTIN) 300 MG capsule Take 300 mg by mouth 3 times daily. Yes Historical Provider, MD   simvastatin (ZOCOR) 40 MG tablet Take 40 mg by mouth nightly. Yes Historical Provider, MD   traZODone (DESYREL) 150 MG tablet Take 100 mg by mouth nightly    Yes Historical Provider, MD   Acetaminophen 500 MG CAPS Take 1 tablet by mouth as needed for Pain (follow OTC instructions)    Historical Provider, MD       Allergies:  Compazine [prochlorperazine maleate]; Penicillin v potassium; and Penicillins    Social History:   reports that she has been smoking cigarettes. She has a 50.00 pack-year smoking history.  She has never used smokeless tobacco. She reports that she does not drink alcohol or use drugs. Family History: family history includes Cancer in her mother; Heart Attack in her father and mother; Other in her maternal grandfather, paternal grandfather, and paternal grandmother. REVIEW OF SYSTEMS:    · Constitutional: there has been no unanticipated weight loss. There's been No change in energy level, No change in activity level. · Eyes: No visual changes or diplopia. No scleral icterus. · ENT: No Headaches, hearing loss or vertigo. No mouth sores or sore throat. · Cardiovascular: Yes chest pain, Yes dyspnea on exertion, No palpitations or No loss of consciousness. No cough, hemoptysis, No pleuritic pain, or phlebitis. · Respiratory: No cough or wheezing, no sputum production. No hematemesis. · Gastrointestinal: No abdominal pain, appetite loss, blood in stools. No change in bowel or bladder habits. · Genitourinary: No dysuria, trouble voiding, or hematuria. · Musculoskeletal:  No gait disturbance, No weakness or joint complaints. · Integumentary: No rash or pruritis. · Neurological: No headache, diplopia, change in muscle strength, numbness or tingling. No change in gait, balance, coordination, mood, affect, memory, mentation, behavior. · Psychiatric: No anxiety, or depression. · Endocrine: No temperature intolerance. No excessive thirst, fluid intake, or urination. No tremor. · Hematologic/Lymphatic: No abnormal bruising or bleeding, blood clots or swollen lymph nodes. · Allergic/Immunologic: No nasal congestion or hives. PHYSICAL EXAM:    Physical Examination:    /74   Pulse 87   Temp 99.3 °F (37.4 °C) (Oral)   Resp 16   Ht 5' 2\" (1.575 m)   Wt 183 lb 6.4 oz (83.2 kg)   SpO2 94%   BMI 33.54 kg/m²    Constitutional and General Appearance: alert, cooperative, no distress and appears stated age  HEENT: PERRL, no cervical lymphadenopathy. No masses palpable.  Normal oral mucosa  Respiratory:  · Normal excursion and expansion without use of accessory muscles  · Resp Auscultation: Good respiratory effort. No for increased work of breathing. On auscultation: clear to auscultation bilaterally  Cardiovascular:  · The apical impulse is not displaced  · Heart tones are crisp and normal. regular S1 and S2.  · Jugular venous pulsation Normal  · The carotid upstroke is normal in amplitude and contour without delay or bruit  · Peripheral pulses are symmetrical and full   Abdomen:  · No masses or tenderness  · Bowel sounds present  Extremities:  ·  No Cyanosis or Clubbing  ·  Lower extremity edema: No  ·  Skin: Warm and dry  Neurological:  · Alert and oriented. · Moves all extremities well  · No abnormalities of mood, affect, memory, mentation, or behavior are noted    DATA:    Diagnostics:    Ekg. Normal sinus rhythm    Labs:     CBC:   Recent Labs     02/25/20  0602   WBC 10.0   HGB 13.7   HCT 43.9        BMP:   Recent Labs     02/25/20  0602      K 4.0   CO2 21   BUN 10   CREATININE 1.03*   LABGLOM 52*   GLUCOSE 97     BNP: No results for input(s): BNP in the last 72 hours. PT/INR: No results for input(s): PROTIME, INR in the last 72 hours. APTT:No results for input(s): APTT in the last 72 hours. CARDIAC ENZYMES:No results for input(s): CKTOTAL, CKMB, CKMBINDEX, TROPONINI in the last 72 hours. FASTING LIPID PANEL:No results found for: HDL, LDLDIRECT, LDLCALC, TRIG  LIVER PROFILE:No results for input(s): AST, ALT, LABALBU in the last 72 hours. IMPRESSION:    Patient Active Problem List   Diagnosis    Frequency of urination    Severe sepsis (HCC)    Back pain    GERD (gastroesophageal reflux disease)    MVP (mitral valve prolapse)    Depression    Anxiety    Urine retention    Acute kidney injury ([facility] [address]    Esophageal dysphagia    Paraesophageal hernia       RECOMMENDATIONS:  1. Patient presents with atypical chest pain.  She had normal troponins and this is due

## 2020-02-25 NOTE — PROGRESS NOTES
Contacted Andie Larkin, DO multiple times to let her know patient is unable to void, bladder scan showed 440 in bladder, asked for another order to either straight cath or place chu twice and MD never responded.  RN will continue to monitor

## 2020-02-25 NOTE — OP NOTE
performed,a syeda fundoplication was performed.  An 0 silk suture was used to re-create the angle of His.  Once the angle of His was re-created, 3-0 silk sutures were used to suture the greater curvature of the stomach to the right alphonse.  The wrap was not too tight. With the fundoplication completed, I inspected both sides of the stomach to make sure there was no evidence of any ischemia. Having confirmed this, then the bougie was removed under direct vision. I then removed the [facility] under direct vision and then all ports instruments were removed under direct vision as well. With all ports out, all skin incisions were injected with local anesthetic solution,they were closed with 4-0 Monocryl suture and then DermaFlex material was  applied. The patient tolerated the procedure well, was transferred to  recovery room in satisfactory condition.  All sponge and instrument counts were correct at the completion of the case.  Dr Josephine Amezquita was present for the entirety of the procedure

## 2020-02-26 ENCOUNTER — APPOINTMENT (OUTPATIENT)
Dept: CT IMAGING | Age: 75
DRG: 165 | End: 2020-02-26
Attending: SURGERY
Payer: MEDICARE

## 2020-02-26 LAB
ABSOLUTE EOS #: 0.06 K/UL (ref 0–0.44)
ABSOLUTE IMMATURE GRANULOCYTE: 0.07 K/UL (ref 0–0.3)
ABSOLUTE LYMPH #: 0.95 K/UL (ref 1.1–3.7)
ABSOLUTE MONO #: 0.62 K/UL (ref 0.1–1.2)
ANION GAP SERPL CALCULATED.3IONS-SCNC: 10 MMOL/L (ref 9–17)
BASOPHILS # BLD: 0 % (ref 0–2)
BASOPHILS ABSOLUTE: 0.03 K/UL (ref 0–0.2)
BUN BLDV-MCNC: 5 MG/DL (ref 8–23)
BUN/CREAT BLD: ABNORMAL (ref 9–20)
CALCIUM SERPL-MCNC: 8.3 MG/DL (ref 8.6–10.4)
CHLORIDE BLD-SCNC: 103 MMOL/L (ref 98–107)
CO2: 25 MMOL/L (ref 20–31)
CREAT SERPL-MCNC: 0.88 MG/DL (ref 0.5–0.9)
DIFFERENTIAL TYPE: ABNORMAL
EKG ATRIAL RATE: 91 BPM
EKG P AXIS: 31 DEGREES
EKG P-R INTERVAL: 148 MS
EKG Q-T INTERVAL: 328 MS
EKG QRS DURATION: 78 MS
EKG QTC CALCULATION (BAZETT): 403 MS
EKG R AXIS: 35 DEGREES
EKG T AXIS: 42 DEGREES
EKG VENTRICULAR RATE: 91 BPM
EOSINOPHILS RELATIVE PERCENT: 1 % (ref 1–4)
GFR AFRICAN AMERICAN: >60 ML/MIN
GFR NON-AFRICAN AMERICAN: >60 ML/MIN
GFR SERPL CREATININE-BSD FRML MDRD: ABNORMAL ML/MIN/{1.73_M2}
GFR SERPL CREATININE-BSD FRML MDRD: ABNORMAL ML/MIN/{1.73_M2}
GLUCOSE BLD-MCNC: 108 MG/DL (ref 70–99)
HCT VFR BLD CALC: 37.7 % (ref 36.3–47.1)
HEMOGLOBIN: 11.7 G/DL (ref 11.9–15.1)
IMMATURE GRANULOCYTES: 1 %
LV EF: 68 %
LVEF MODALITY: NORMAL
LYMPHOCYTES # BLD: 11 % (ref 24–43)
MCH RBC QN AUTO: 31.5 PG (ref 25.2–33.5)
MCHC RBC AUTO-ENTMCNC: 31 G/DL (ref 28.4–34.8)
MCV RBC AUTO: 101.3 FL (ref 82.6–102.9)
MONOCYTES # BLD: 7 % (ref 3–12)
NRBC AUTOMATED: 0 PER 100 WBC
PDW BLD-RTO: 13.8 % (ref 11.8–14.4)
PLATELET # BLD: 160 K/UL (ref 138–453)
PLATELET ESTIMATE: ABNORMAL
PMV BLD AUTO: 11.6 FL (ref 8.1–13.5)
POTASSIUM SERPL-SCNC: 4.4 MMOL/L (ref 3.7–5.3)
RBC # BLD: 3.72 M/UL (ref 3.95–5.11)
RBC # BLD: ABNORMAL 10*6/UL
SEG NEUTROPHILS: 80 % (ref 36–65)
SEGMENTED NEUTROPHILS ABSOLUTE COUNT: 7.32 K/UL (ref 1.5–8.1)
SODIUM BLD-SCNC: 138 MMOL/L (ref 135–144)
TROPONIN INTERP: ABNORMAL
TROPONIN T: ABNORMAL NG/ML
TROPONIN, HIGH SENSITIVITY: 17 NG/L (ref 0–14)
WBC # BLD: 9.1 K/UL (ref 3.5–11.3)
WBC # BLD: ABNORMAL 10*3/UL

## 2020-02-26 PROCEDURE — 6370000000 HC RX 637 (ALT 250 FOR IP): Performed by: SURGERY

## 2020-02-26 PROCEDURE — 80048 BASIC METABOLIC PNL TOTAL CA: CPT

## 2020-02-26 PROCEDURE — 2580000003 HC RX 258: Performed by: SURGERY

## 2020-02-26 PROCEDURE — 71260 CT THORAX DX C+: CPT

## 2020-02-26 PROCEDURE — 84484 ASSAY OF TROPONIN QUANT: CPT

## 2020-02-26 PROCEDURE — G0378 HOSPITAL OBSERVATION PER HR: HCPCS

## 2020-02-26 PROCEDURE — 36415 COLL VENOUS BLD VENIPUNCTURE: CPT

## 2020-02-26 PROCEDURE — 6370000000 HC RX 637 (ALT 250 FOR IP): Performed by: STUDENT IN AN ORGANIZED HEALTH CARE EDUCATION/TRAINING PROGRAM

## 2020-02-26 PROCEDURE — 6360000004 HC RX CONTRAST MEDICATION: Performed by: SURGERY

## 2020-02-26 PROCEDURE — 93306 TTE W/DOPPLER COMPLETE: CPT

## 2020-02-26 PROCEDURE — 93005 ELECTROCARDIOGRAM TRACING: CPT | Performed by: NURSE PRACTITIONER

## 2020-02-26 PROCEDURE — 93010 ELECTROCARDIOGRAM REPORT: CPT | Performed by: INTERNAL MEDICINE

## 2020-02-26 PROCEDURE — 6360000002 HC RX W HCPCS: Performed by: SURGERY

## 2020-02-26 PROCEDURE — 99024 POSTOP FOLLOW-UP VISIT: CPT | Performed by: SURGERY

## 2020-02-26 PROCEDURE — 6370000000 HC RX 637 (ALT 250 FOR IP): Performed by: NURSE PRACTITIONER

## 2020-02-26 PROCEDURE — 85025 COMPLETE CBC W/AUTO DIFF WBC: CPT

## 2020-02-26 PROCEDURE — 94761 N-INVAS EAR/PLS OXIMETRY MLT: CPT

## 2020-02-26 PROCEDURE — 96372 THER/PROPH/DIAG INJ SC/IM: CPT

## 2020-02-26 RX ORDER — NITROGLYCERIN 0.4 MG/1
0.4 TABLET SUBLINGUAL 3 TIMES DAILY PRN
Status: DISCONTINUED | OUTPATIENT
Start: 2020-02-26 | End: 2020-02-28 | Stop reason: HOSPADM

## 2020-02-26 RX ORDER — CYCLOBENZAPRINE HCL 5 MG
5 TABLET ORAL EVERY 8 HOURS
Status: DISCONTINUED | OUTPATIENT
Start: 2020-02-26 | End: 2020-02-28 | Stop reason: HOSPADM

## 2020-02-26 RX ADMIN — OXYCODONE HYDROCHLORIDE 5 MG: 5 TABLET ORAL at 12:51

## 2020-02-26 RX ADMIN — BUSPIRONE HYDROCHLORIDE 7.5 MG: 5 TABLET ORAL at 13:37

## 2020-02-26 RX ADMIN — PANTOPRAZOLE SODIUM 40 MG: 40 TABLET, DELAYED RELEASE ORAL at 09:51

## 2020-02-26 RX ADMIN — OXYCODONE HYDROCHLORIDE 5 MG: 5 TABLET ORAL at 21:18

## 2020-02-26 RX ADMIN — GABAPENTIN 300 MG: 300 CAPSULE ORAL at 09:50

## 2020-02-26 RX ADMIN — GABAPENTIN 300 MG: 300 CAPSULE ORAL at 21:18

## 2020-02-26 RX ADMIN — TAMSULOSIN HYDROCHLORIDE 0.4 MG: 0.4 CAPSULE ORAL at 09:51

## 2020-02-26 RX ADMIN — CYCLOBENZAPRINE HYDROCHLORIDE 5 MG: 5 TABLET, FILM COATED ORAL at 01:31

## 2020-02-26 RX ADMIN — AMITRIPTYLINE HYDROCHLORIDE 10 MG: 10 TABLET, FILM COATED ORAL at 09:50

## 2020-02-26 RX ADMIN — BUSPIRONE HYDROCHLORIDE 7.5 MG: 5 TABLET ORAL at 09:51

## 2020-02-26 RX ADMIN — TRAZODONE HYDROCHLORIDE 100 MG: 100 TABLET ORAL at 21:18

## 2020-02-26 RX ADMIN — ESCITALOPRAM OXALATE 10 MG: 10 TABLET ORAL at 09:51

## 2020-02-26 RX ADMIN — SODIUM CHLORIDE, POTASSIUM CHLORIDE, SODIUM LACTATE AND CALCIUM CHLORIDE: 600; 310; 30; 20 INJECTION, SOLUTION INTRAVENOUS at 04:32

## 2020-02-26 RX ADMIN — GABAPENTIN 300 MG: 300 CAPSULE ORAL at 13:37

## 2020-02-26 RX ADMIN — OXYCODONE HYDROCHLORIDE 5 MG: 5 TABLET ORAL at 16:58

## 2020-02-26 RX ADMIN — OXYCODONE HYDROCHLORIDE 5 MG: 5 TABLET ORAL at 02:54

## 2020-02-26 RX ADMIN — OXYCODONE HYDROCHLORIDE 5 MG: 5 TABLET ORAL at 07:40

## 2020-02-26 RX ADMIN — IOHEXOL 75 ML: 350 INJECTION, SOLUTION INTRAVENOUS at 10:14

## 2020-02-26 RX ADMIN — Medication 10 ML: at 09:52

## 2020-02-26 RX ADMIN — BUSPIRONE HYDROCHLORIDE 7.5 MG: 5 TABLET ORAL at 21:19

## 2020-02-26 RX ADMIN — AMITRIPTYLINE HYDROCHLORIDE 10 MG: 10 TABLET, FILM COATED ORAL at 13:37

## 2020-02-26 RX ADMIN — CLONAZEPAM 0.5 MG: 0.5 TABLET ORAL at 09:51

## 2020-02-26 RX ADMIN — CYCLOBENZAPRINE HYDROCHLORIDE 5 MG: 5 TABLET, FILM COATED ORAL at 17:21

## 2020-02-26 RX ADMIN — NITROGLYCERIN 0.4 MG: 0.4 TABLET SUBLINGUAL at 01:07

## 2020-02-26 RX ADMIN — ENOXAPARIN SODIUM 40 MG: 40 INJECTION SUBCUTANEOUS at 09:51

## 2020-02-26 RX ADMIN — CYCLOBENZAPRINE HYDROCHLORIDE 5 MG: 5 TABLET, FILM COATED ORAL at 09:51

## 2020-02-26 RX ADMIN — METOPROLOL SUCCINATE 25 MG: 25 TABLET, FILM COATED, EXTENDED RELEASE ORAL at 09:51

## 2020-02-26 RX ADMIN — AMITRIPTYLINE HYDROCHLORIDE 10 MG: 10 TABLET, FILM COATED ORAL at 21:19

## 2020-02-26 RX ADMIN — CLONAZEPAM 0.5 MG: 0.5 TABLET ORAL at 21:18

## 2020-02-26 ASSESSMENT — PAIN SCALES - GENERAL
PAINLEVEL_OUTOF10: 8
PAINLEVEL_OUTOF10: 7
PAINLEVEL_OUTOF10: 5
PAINLEVEL_OUTOF10: 5
PAINLEVEL_OUTOF10: 6
PAINLEVEL_OUTOF10: 9
PAINLEVEL_OUTOF10: 5
PAINLEVEL_OUTOF10: 6
PAINLEVEL_OUTOF10: 5
PAINLEVEL_OUTOF10: 6

## 2020-02-26 ASSESSMENT — PAIN DESCRIPTION - PAIN TYPE: TYPE: SURGICAL PAIN

## 2020-02-26 ASSESSMENT — PAIN DESCRIPTION - LOCATION: LOCATION: ABDOMEN

## 2020-02-26 ASSESSMENT — PAIN DESCRIPTION - DESCRIPTORS: DESCRIPTORS: ACHING

## 2020-02-26 ASSESSMENT — PAIN DESCRIPTION - FREQUENCY: FREQUENCY: CONTINUOUS

## 2020-02-26 NOTE — PLAN OF CARE
Problem: Falls - Risk of:  Goal: Will remain free from falls  Description  Will remain free from falls  2/26/2020 0517 by Erica Almaraz, RN  Outcome: Met This Shift  2/25/2020 1800 by Vicki Castro RN  Outcome: Ongoing  Goal: Absence of physical injury  Description  Absence of physical injury  2/26/2020 0517 by Erica Almaraz, RN  Outcome: Met This Shift  2/25/2020 1800 by Vicki Castro RN  Outcome: Ongoing     Problem: Pain:  Goal: Pain level will decrease  Description  Pain level will decrease  2/26/2020 0517 by Erica Almaraz, RN  Outcome: Ongoing  2/25/2020 1800 by Vicki Castro RN  Outcome: Ongoing  Goal: Control of acute pain  Description  Control of acute pain  2/26/2020 0517 by Erica Almaraz, RN  Outcome: Ongoing  2/25/2020 1800 by Vicki Castro RN  Outcome: Ongoing  Goal: Control of chronic pain  Description  Control of chronic pain  2/26/2020 0517 by Erica Almaraz, RN  Outcome: Ongoing  2/25/2020 1800 by Vicki Castro RN  Outcome: Ongoing

## 2020-02-26 NOTE — PROGRESS NOTES
Notified Dr. Abdullahi Craig of patients temp of 101.2. Patient is 83% on 4L NC. No new orders. Will continue to monitor.

## 2020-02-26 NOTE — PROGRESS NOTES
[facility]   Section of Cardiology  Progress Note      Date:  2/26/2020  Patient: Bismark Montes  Admission:  2/24/2020  5:38 AM  Admit DX: Paraesophageal hernia [K44.9]  Age:  76 y.o., 1945                           LOS: 0 days     Reason for evaluation:   Chest pain  SP surgery      SUBJECTIVE:     The patient was seen and examined. Notes and labs reviewed. There were complications over night. She was hypoxic overnight. CT scan of the chest showed no evidence of pulmonary embolism. She however had atelectasis and this could be secondary to either medications from surgery or pain from the surgical procedure      OBJECTIVE:    /63   Pulse 86   Temp 98.7 °F (37.1 °C) (Oral)   Resp 18   Ht 5' 2\" (1.575 m)   Wt 183 lb 6.4 oz (83.2 kg)   SpO2 (!) 89%   BMI 33.54 kg/m²     Intake/Output Summary (Last 24 hours) at 2/26/2020 1554  Last data filed at 2/26/2020 1030  Gross per 24 hour   Intake --   Output 3200 ml   Net -3200 ml       EXAM:   CONSTITUTIONAL:  awake, alert, cooperative, no apparent distress, and appears stated age. HEENT: Normal jugular venous pulsations, no carotid bruits. Head is atraumatic, normocephalic. Eyes and oral mucosa are normal.  LUNGS: Good respiratory effort. No for increased work of breathing. On auscultation: clear to auscultation bilaterally  CARDIOVASCULAR:  Normal apical impulse, regular rate and rhythm, normal S1 and S2, no S3 or S4, and no murmur or rub noted. ABDOMEN: Soft, nontender, nondistended. Bowel sounds present. No masses or tenderness. SKIN: Warm and dry. EXTREMITIES: No lower extremity edema. Motor movement grossly intact. No cyanosis or clubbing.     Current Inpatient Medications:   cyclobenzaprine  5 mg Oral Q8H    tamsulosin  0.4 mg Oral Daily    sodium chloride flush  10 mL Intravenous 2 times per day    enoxaparin  40 mg Subcutaneous Daily    pantoprazole  40 mg Oral Daily    amitriptyline  10 mg Oral TID    amLODIPine  10 mg Oral

## 2020-02-26 NOTE — PROGRESS NOTES
Surgery Progress Note            PATIENT NAME: Krzysztof Ramirez     TODAY'S DATE: 2/26/2020, 6:37 AM    SUBJECTIVE:    Pt S+E. Overnight patient hypoxic while on 5 L NC. She was also noted to be febrile with temperature of 101.2. She denies CP and SOB. Abdominal incision pain is well controlled. She is ambulating with walker. She is tolerating FLD without nausea or vomiting. OBJECTIVE:   VITALS:  BP (!) 111/58   Pulse 84   Temp 99.9 °F (37.7 °C) (Oral)   Resp 16   Ht 5' 2\" (1.575 m)   Wt 183 lb 6.4 oz (83.2 kg)   SpO2 90%   BMI 33.54 kg/m²      INTAKE/OUTPUT:      Intake/Output Summary (Last 24 hours) at 2/26/2020 7192  Last data filed at 2/26/2020 0433  Gross per 24 hour   Intake --   Output 3330 ml   Net -3330 ml       PHYSICAL EXAM  Constitutional:  Vital signs are normal. The patient appears well-developed and well-nourished. HEENT:      Head: Normocephalic. Atraumatic     Eyes: pupils are equal and reactive. No scleral icterus is present. Neck: No mass and no thyromegaly present. Cardiovascular: Normal rate, regular rhythm, S1 normal and S2 normal.  Bilateral pulses present. Pulmonary/Chest: Effort normal and breath sounds normal. No retractions. Abdominal: Soft. Incisions C/D/I. Tender at incisions. Musculoskeletal:      Right lower leg: Normal. No tenderness and no edema. Left lower leg: Normal. No tenderness and no edema. Lymphadenopathy:     No cervical adenopathy, No Exrtemity Adenopathy. Neurological: The patient is alert and oriented. Moving all four extremities equally, sensation grossly intact bilateral.  Skin: Skin is warm, dry and intact. POD #2 HHR with Nissen Fundoplication  - AM labs  - OOB, ambulate, IS  - CTA Thorax to evaluate for PE  - FLD, no carbonation or straws  - Cardiology recs  - Home O2 eval  - will possibly pull chu catheter later today      Electronically signed by Vanessa Jefferson DO  on 2/26/2020 at 6:37 AM     Patient seen and examined. Agree with above assessment plan. Respiratory status not improving. In part likely due to her heavy smoking history.   Agree with CT of the chest to evaluate for PE.

## 2020-02-26 NOTE — PROGRESS NOTES
Spoke with NP Tasneem Cruz via telephone. Ordered EKG. New order see MAR. Asked general surgery resident if they wanted a pulmonary consult. They said no. Will continue to monitor.

## 2020-02-27 PROBLEM — Z98.890 HISTORY OF REPAIR OF HIATAL HERNIA: Status: ACTIVE | Noted: 2020-02-27

## 2020-02-27 PROBLEM — Z87.19 HISTORY OF REPAIR OF HIATAL HERNIA: Status: ACTIVE | Noted: 2020-02-27

## 2020-02-27 LAB
ABSOLUTE EOS #: 0.09 K/UL (ref 0–0.44)
ABSOLUTE IMMATURE GRANULOCYTE: 0.07 K/UL (ref 0–0.3)
ABSOLUTE LYMPH #: 0.71 K/UL (ref 1.1–3.7)
ABSOLUTE MONO #: 0.59 K/UL (ref 0.1–1.2)
ANION GAP SERPL CALCULATED.3IONS-SCNC: 9 MMOL/L (ref 9–17)
BASOPHILS # BLD: 0 % (ref 0–2)
BASOPHILS ABSOLUTE: 0.03 K/UL (ref 0–0.2)
BUN BLDV-MCNC: 5 MG/DL (ref 8–23)
BUN/CREAT BLD: ABNORMAL (ref 9–20)
CALCIUM SERPL-MCNC: 8.3 MG/DL (ref 8.6–10.4)
CHLORIDE BLD-SCNC: 103 MMOL/L (ref 98–107)
CO2: 24 MMOL/L (ref 20–31)
CREAT SERPL-MCNC: 0.81 MG/DL (ref 0.5–0.9)
DIFFERENTIAL TYPE: ABNORMAL
EOSINOPHILS RELATIVE PERCENT: 1 % (ref 1–4)
GFR AFRICAN AMERICAN: >60 ML/MIN
GFR NON-AFRICAN AMERICAN: >60 ML/MIN
GFR SERPL CREATININE-BSD FRML MDRD: ABNORMAL ML/MIN/{1.73_M2}
GFR SERPL CREATININE-BSD FRML MDRD: ABNORMAL ML/MIN/{1.73_M2}
GLUCOSE BLD-MCNC: 96 MG/DL (ref 70–99)
HCT VFR BLD CALC: 38.5 % (ref 36.3–47.1)
HEMOGLOBIN: 11.9 G/DL (ref 11.9–15.1)
IMMATURE GRANULOCYTES: 1 %
LYMPHOCYTES # BLD: 8 % (ref 24–43)
MCH RBC QN AUTO: 31.2 PG (ref 25.2–33.5)
MCHC RBC AUTO-ENTMCNC: 30.9 G/DL (ref 28.4–34.8)
MCV RBC AUTO: 101 FL (ref 82.6–102.9)
MONOCYTES # BLD: 6 % (ref 3–12)
NRBC AUTOMATED: 0 PER 100 WBC
PDW BLD-RTO: 13.6 % (ref 11.8–14.4)
PLATELET # BLD: ABNORMAL K/UL (ref 138–453)
PLATELET ESTIMATE: ABNORMAL
PLATELET, FLUORESCENCE: 130 K/UL (ref 138–453)
PLATELET, IMMATURE FRACTION: 5.3 % (ref 1.1–10.3)
PMV BLD AUTO: ABNORMAL FL (ref 8.1–13.5)
POTASSIUM SERPL-SCNC: 4.1 MMOL/L (ref 3.7–5.3)
RBC # BLD: 3.81 M/UL (ref 3.95–5.11)
RBC # BLD: ABNORMAL 10*6/UL
SEG NEUTROPHILS: 84 % (ref 36–65)
SEGMENTED NEUTROPHILS ABSOLUTE COUNT: 7.89 K/UL (ref 1.5–8.1)
SODIUM BLD-SCNC: 136 MMOL/L (ref 135–144)
WBC # BLD: 9.4 K/UL (ref 3.5–11.3)
WBC # BLD: ABNORMAL 10*3/UL

## 2020-02-27 PROCEDURE — 80048 BASIC METABOLIC PNL TOTAL CA: CPT

## 2020-02-27 PROCEDURE — 2700000000 HC OXYGEN THERAPY PER DAY

## 2020-02-27 PROCEDURE — 6370000000 HC RX 637 (ALT 250 FOR IP): Performed by: STUDENT IN AN ORGANIZED HEALTH CARE EDUCATION/TRAINING PROGRAM

## 2020-02-27 PROCEDURE — 85025 COMPLETE CBC W/AUTO DIFF WBC: CPT

## 2020-02-27 PROCEDURE — 36415 COLL VENOUS BLD VENIPUNCTURE: CPT

## 2020-02-27 PROCEDURE — 1200000000 HC SEMI PRIVATE

## 2020-02-27 PROCEDURE — 6360000002 HC RX W HCPCS: Performed by: SURGERY

## 2020-02-27 PROCEDURE — 99024 POSTOP FOLLOW-UP VISIT: CPT | Performed by: SURGERY

## 2020-02-27 PROCEDURE — 6370000000 HC RX 637 (ALT 250 FOR IP): Performed by: SURGERY

## 2020-02-27 PROCEDURE — 51798 US URINE CAPACITY MEASURE: CPT

## 2020-02-27 PROCEDURE — 85055 RETICULATED PLATELET ASSAY: CPT

## 2020-02-27 PROCEDURE — 94760 N-INVAS EAR/PLS OXIMETRY 1: CPT

## 2020-02-27 RX ADMIN — CYCLOBENZAPRINE HYDROCHLORIDE 5 MG: 5 TABLET, FILM COATED ORAL at 01:15

## 2020-02-27 RX ADMIN — BUSPIRONE HYDROCHLORIDE 7.5 MG: 5 TABLET ORAL at 14:11

## 2020-02-27 RX ADMIN — OXYCODONE HYDROCHLORIDE 5 MG: 5 TABLET ORAL at 17:47

## 2020-02-27 RX ADMIN — PANTOPRAZOLE SODIUM 40 MG: 40 TABLET, DELAYED RELEASE ORAL at 08:28

## 2020-02-27 RX ADMIN — BUSPIRONE HYDROCHLORIDE 7.5 MG: 5 TABLET ORAL at 08:27

## 2020-02-27 RX ADMIN — BUSPIRONE HYDROCHLORIDE 7.5 MG: 5 TABLET ORAL at 21:25

## 2020-02-27 RX ADMIN — GABAPENTIN 300 MG: 300 CAPSULE ORAL at 08:28

## 2020-02-27 RX ADMIN — TAMSULOSIN HYDROCHLORIDE 0.4 MG: 0.4 CAPSULE ORAL at 08:28

## 2020-02-27 RX ADMIN — OXYCODONE HYDROCHLORIDE 5 MG: 5 TABLET ORAL at 12:31

## 2020-02-27 RX ADMIN — TRAZODONE HYDROCHLORIDE 100 MG: 100 TABLET ORAL at 21:25

## 2020-02-27 RX ADMIN — ENOXAPARIN SODIUM 40 MG: 40 INJECTION SUBCUTANEOUS at 08:27

## 2020-02-27 RX ADMIN — AMITRIPTYLINE HYDROCHLORIDE 10 MG: 10 TABLET, FILM COATED ORAL at 14:11

## 2020-02-27 RX ADMIN — CLONAZEPAM 0.5 MG: 0.5 TABLET ORAL at 08:28

## 2020-02-27 RX ADMIN — AMITRIPTYLINE HYDROCHLORIDE 10 MG: 10 TABLET, FILM COATED ORAL at 21:25

## 2020-02-27 RX ADMIN — CLONAZEPAM 0.5 MG: 0.5 TABLET ORAL at 21:25

## 2020-02-27 RX ADMIN — OXYCODONE HYDROCHLORIDE 5 MG: 5 TABLET ORAL at 05:52

## 2020-02-27 RX ADMIN — GABAPENTIN 300 MG: 300 CAPSULE ORAL at 14:11

## 2020-02-27 RX ADMIN — CYCLOBENZAPRINE HYDROCHLORIDE 5 MG: 5 TABLET, FILM COATED ORAL at 17:47

## 2020-02-27 RX ADMIN — METOPROLOL SUCCINATE 25 MG: 25 TABLET, FILM COATED, EXTENDED RELEASE ORAL at 08:28

## 2020-02-27 RX ADMIN — SIMETHICONE 80 MG: 80 TABLET, CHEWABLE ORAL at 21:25

## 2020-02-27 RX ADMIN — CYCLOBENZAPRINE HYDROCHLORIDE 5 MG: 5 TABLET, FILM COATED ORAL at 08:28

## 2020-02-27 RX ADMIN — SIMETHICONE 80 MG: 80 TABLET, CHEWABLE ORAL at 12:31

## 2020-02-27 RX ADMIN — ESCITALOPRAM OXALATE 10 MG: 10 TABLET ORAL at 08:28

## 2020-02-27 RX ADMIN — AMITRIPTYLINE HYDROCHLORIDE 10 MG: 10 TABLET, FILM COATED ORAL at 08:27

## 2020-02-27 RX ADMIN — GABAPENTIN 300 MG: 300 CAPSULE ORAL at 21:25

## 2020-02-27 ASSESSMENT — PAIN SCALES - GENERAL
PAINLEVEL_OUTOF10: 3
PAINLEVEL_OUTOF10: 3
PAINLEVEL_OUTOF10: 7
PAINLEVEL_OUTOF10: 6
PAINLEVEL_OUTOF10: 6
PAINLEVEL_OUTOF10: 2

## 2020-02-27 ASSESSMENT — PAIN DESCRIPTION - LOCATION: LOCATION: ABDOMEN

## 2020-02-27 ASSESSMENT — PAIN DESCRIPTION - DESCRIPTORS: DESCRIPTORS: ACHING

## 2020-02-27 ASSESSMENT — PAIN DESCRIPTION - PAIN TYPE: TYPE: SURGICAL PAIN

## 2020-02-27 NOTE — PROGRESS NOTES
Dr Ashleigh Christian made aware of low oxygen levels and if an acapella would benefit patient  Order received and respiratory called to assist patient.   No further orders received at this time

## 2020-02-27 NOTE — PLAN OF CARE
Problem: Falls - Risk of:  Goal: Will remain free from falls  Description  Will remain free from falls  2/27/2020 1815 by Sherin Hurtado RN  Outcome: Ongoing  2/27/2020 0804 by Candice Kohler RN  Outcome: Ongoing  Goal: Absence of physical injury  Description  Absence of physical injury  2/27/2020 1815 by Sherin Hurtado RN  Outcome: Ongoing  2/27/2020 0804 by Candice Kohler RN  Outcome: Ongoing     Problem: Pain:  Goal: Pain level will decrease  Description  Pain level will decrease  2/27/2020 1815 by Sherin Hurtado RN  Outcome: Ongoing  2/27/2020 0804 by Candice Kohler RN  Outcome: Ongoing  Goal: Control of acute pain  Description  Control of acute pain  2/27/2020 1815 by Sherin Hurtado RN  Outcome: Ongoing  2/27/2020 0804 by Candice Kohler RN  Outcome: Ongoing  Goal: Control of chronic pain  Description  Control of chronic pain  Outcome: Ongoing

## 2020-02-27 NOTE — DISCHARGE INSTR - COC
Continuity of Care Form    Patient Name: Lindsey Mosher   :  1945  MRN:  [id number]    Admit date:  2020  Discharge date:  20    Code Status Order: Full Code   Advance Directives:   [facility] Documentation     Date/Time Healthcare Directive Type of Healthcare Directive Copy in [address]  [address] Agent's Name Healthcare Agent's Phone Number    [phone]  Yes, patient has an advance directive for healthcare treatment copy requested from patient -- -- -- -- --          Admitting Physician:  Rayetta Dance, MD  PCP: Nasir Headley MD    Discharging Nurse: Valentin Hilariopherd [facility] Unit/Room#: 4316/4420-35  Discharging Unit Phone Number: [phone]    Emergency Contact:   Extended Emergency Contact Information  Primary Emergency Contact: Hamilton Ramirez  Home Phone: [phone]  Relation: Child    Past Surgical History:  Past Surgical History:   Procedure Laterality Date    APPENDECTOMY      CARPAL TUNNEL RELEASE      COLONOSCOPY      CYSTOSCOPY      EYE SURGERY      patricia IOL    GASTRIC FUNDOPLICATION N/A 8320    XI LAPAROSCOPIC ROBOTIC HIATAL HERNIA REPAIR, CHERYL FUNDOPLICATION performed by Rayetta Dance, MD at [address] [facility]  2020    LAPAROSCOPIC ROBOTIC [facility], CHERYL FUNDOPLICATION     HYSTERECTOMY      Abdominal    JOINT REPLACEMENT Right     right hip    OVARY REMOVAL      RHINOPLASTY      TONSILLECTOMY      TOTAL HIP ARTHROPLASTY      TOTAL NEPHRECTOMY      atrophic from infections, age 13   [facility] TUBAL LIGATION         Immunization History: There is no immunization history on file for this patient.     Active Problems:  Patient Active Problem List   Diagnosis Code    Frequency of urination R35.0    Severe sepsis (HCC) A41.9, R65.20    Back pain M54.9    GERD (gastroesophageal reflux disease) K21.9    MVP (mitral valve prolapse) I34.1    Depression F32.9    Anxiety F41.9

## 2020-02-28 VITALS
TEMPERATURE: 98.2 F | HEIGHT: 62 IN | RESPIRATION RATE: 16 BRPM | OXYGEN SATURATION: 93 % | DIASTOLIC BLOOD PRESSURE: 63 MMHG | SYSTOLIC BLOOD PRESSURE: 139 MMHG | HEART RATE: 90 BPM | BODY MASS INDEX: 33.75 KG/M2 | WEIGHT: 183.4 LBS

## 2020-02-28 PROCEDURE — 6370000000 HC RX 637 (ALT 250 FOR IP): Performed by: SURGERY

## 2020-02-28 PROCEDURE — 6370000000 HC RX 637 (ALT 250 FOR IP): Performed by: STUDENT IN AN ORGANIZED HEALTH CARE EDUCATION/TRAINING PROGRAM

## 2020-02-28 PROCEDURE — 6360000002 HC RX W HCPCS: Performed by: SURGERY

## 2020-02-28 PROCEDURE — 2580000003 HC RX 258: Performed by: SURGERY

## 2020-02-28 PROCEDURE — 99024 POSTOP FOLLOW-UP VISIT: CPT | Performed by: SURGERY

## 2020-02-28 RX ORDER — CALCIUM CARBONATE 200(500)MG
500 TABLET,CHEWABLE ORAL 3 TIMES DAILY PRN
Status: DISCONTINUED | OUTPATIENT
Start: 2020-02-28 | End: 2020-02-28 | Stop reason: HOSPADM

## 2020-02-28 RX ADMIN — PANTOPRAZOLE SODIUM 40 MG: 40 TABLET, DELAYED RELEASE ORAL at 08:21

## 2020-02-28 RX ADMIN — OXYCODONE HYDROCHLORIDE 5 MG: 5 TABLET ORAL at 04:20

## 2020-02-28 RX ADMIN — BUSPIRONE HYDROCHLORIDE 7.5 MG: 5 TABLET ORAL at 08:21

## 2020-02-28 RX ADMIN — SIMETHICONE 80 MG: 80 TABLET, CHEWABLE ORAL at 08:21

## 2020-02-28 RX ADMIN — TAMSULOSIN HYDROCHLORIDE 0.4 MG: 0.4 CAPSULE ORAL at 08:20

## 2020-02-28 RX ADMIN — AMITRIPTYLINE HYDROCHLORIDE 10 MG: 10 TABLET, FILM COATED ORAL at 08:21

## 2020-02-28 RX ADMIN — CYCLOBENZAPRINE HYDROCHLORIDE 5 MG: 5 TABLET, FILM COATED ORAL at 08:20

## 2020-02-28 RX ADMIN — ENOXAPARIN SODIUM 40 MG: 40 INJECTION SUBCUTANEOUS at 08:20

## 2020-02-28 RX ADMIN — SODIUM CHLORIDE, POTASSIUM CHLORIDE, SODIUM LACTATE AND CALCIUM CHLORIDE: 600; 310; 30; 20 INJECTION, SOLUTION INTRAVENOUS at 06:21

## 2020-02-28 RX ADMIN — GABAPENTIN 300 MG: 300 CAPSULE ORAL at 08:20

## 2020-02-28 RX ADMIN — OXYCODONE HYDROCHLORIDE 5 MG: 5 TABLET ORAL at 08:21

## 2020-02-28 RX ADMIN — METOPROLOL SUCCINATE 25 MG: 25 TABLET, FILM COATED, EXTENDED RELEASE ORAL at 08:20

## 2020-02-28 RX ADMIN — ESCITALOPRAM OXALATE 10 MG: 10 TABLET ORAL at 08:20

## 2020-02-28 RX ADMIN — CLONAZEPAM 0.5 MG: 0.5 TABLET ORAL at 08:21

## 2020-02-28 RX ADMIN — CYCLOBENZAPRINE HYDROCHLORIDE 5 MG: 5 TABLET, FILM COATED ORAL at 01:29

## 2020-02-28 ASSESSMENT — PAIN SCALES - GENERAL
PAINLEVEL_OUTOF10: 7
PAINLEVEL_OUTOF10: 6
PAINLEVEL_OUTOF10: 7
PAINLEVEL_OUTOF10: 5

## 2020-02-28 ASSESSMENT — PAIN DESCRIPTION - LOCATION: LOCATION: ABDOMEN

## 2020-02-28 ASSESSMENT — PAIN DESCRIPTION - PAIN TYPE: TYPE: SURGICAL PAIN

## 2020-02-28 ASSESSMENT — PAIN DESCRIPTION - DESCRIPTORS: DESCRIPTORS: PRESSURE;ACHING

## 2020-02-28 ASSESSMENT — PAIN DESCRIPTION - ORIENTATION: ORIENTATION: UPPER

## 2020-02-28 NOTE — PLAN OF CARE
Problem: Falls - Risk of:  Goal: Will remain free from falls  Description  Will remain free from falls  2/28/2020 1223 by Ayaka Luna RN  Outcome: Met This Shift  2/28/2020 0431 by Yumi Callejas RN  Outcome: Ongoing  Goal: Absence of physical injury  Description  Absence of physical injury  2/28/2020 1223 by Ayaka Luna RN  Outcome: Met This Shift  2/28/2020 0431 by Yumi Callejas RN  Outcome: Ongoing     Problem: Pain:  Goal: Pain level will decrease  Description  Pain level will decrease  2/28/2020 1223 by Ayaka Luna RN  Outcome: Met This Shift  2/28/2020 0431 by Yumi Callejas RN  Outcome: Ongoing  Goal: Control of acute pain  Description  Control of acute pain  2/28/2020 1223 by Ayaka Luna RN  Outcome: Met This Shift  2/28/2020 0431 by Yumi Callejas RN  Outcome: Ongoing  Goal: Control of chronic pain  Description  Control of chronic pain  Outcome: Met This Shift

## 2020-02-28 NOTE — CARE COORDINATION
Transitional Planning  Faxed DME order signed by attending , face to face co signed by attending, H&P, home oxygen eval to [facility] notified VIA [facility] for [employer]. Oxygen delivered to room      9:25  Spoke with pt about home care feel she would benefit from [facility]  Provided her with list for freedom of choice she has had [facility] in past and would like to resume.   Perfect served Dr for home care order as ulises is signed    10:30  Home care order placed  12:30  Called [facility] to notify of discharge spoke with Selena Mohs      Discharge [facility]  Written by: Denny Loja RN    Patient Name: Mendoza Rigoberto  Attending Provider: No att. providers found  Admit Date: 2020  5:38 AM  MRN: [id number]  Account: [de-identified]                     : 1945  Discharge Date: 2020      Disposition: home with [facility] and Home Oxygen with Monet Barreto RN

## 2020-02-29 ENCOUNTER — HOSPITAL ENCOUNTER (EMERGENCY)
Age: 75
Discharge: ANOTHER HOSPITAL | End: 2020-02-29
Attending: EMERGENCY MEDICINE | Admitting: INTERNAL MEDICINE
Payer: MEDICARE

## 2020-02-29 ENCOUNTER — APPOINTMENT (OUTPATIENT)
Dept: CT IMAGING | Age: 75
End: 2020-02-29
Payer: MEDICARE

## 2020-02-29 ENCOUNTER — HOSPITAL ENCOUNTER (INPATIENT)
Age: 75
LOS: 37 days | Discharge: NURSING, REHAB OR LONG-TERM CARE | DRG: 004 | End: 2020-04-06
Attending: INTERNAL MEDICINE | Admitting: INTERNAL MEDICINE
Payer: MEDICARE

## 2020-02-29 ENCOUNTER — APPOINTMENT (OUTPATIENT)
Dept: GENERAL RADIOLOGY | Age: 75
End: 2020-02-29
Payer: MEDICARE

## 2020-02-29 ENCOUNTER — APPOINTMENT (OUTPATIENT)
Dept: GENERAL RADIOLOGY | Age: 75
DRG: 004 | End: 2020-02-29
Attending: INTERNAL MEDICINE
Payer: MEDICARE

## 2020-02-29 VITALS
RESPIRATION RATE: 20 BRPM | DIASTOLIC BLOOD PRESSURE: 82 MMHG | SYSTOLIC BLOOD PRESSURE: 125 MMHG | OXYGEN SATURATION: 95 % | HEIGHT: 63 IN | TEMPERATURE: 96.3 F | BODY MASS INDEX: 32.97 KG/M2 | WEIGHT: 186.07 LBS | HEART RATE: 80 BPM

## 2020-02-29 PROBLEM — J69.0 ASPIRATION PNEUMONIA (HCC): Status: ACTIVE | Noted: 2020-02-29

## 2020-02-29 PROBLEM — J96.90 RESPIRATORY FAILURE (HCC): Status: ACTIVE | Noted: 2020-02-29

## 2020-02-29 LAB
% CKMB: 2.8 % (ref 0–3)
-: NORMAL
ABSOLUTE EOS #: 0.1 K/UL (ref 0–0.4)
ABSOLUTE IMMATURE GRANULOCYTE: 0.1 K/UL (ref 0–0.3)
ABSOLUTE LYMPH #: 0.68 K/UL (ref 1–4.8)
ABSOLUTE MONO #: 0.49 K/UL (ref 0.2–0.8)
AMORPHOUS: NORMAL
ANION GAP SERPL CALCULATED.3IONS-SCNC: 14 MMOL/L (ref 9–17)
BACTERIA: NORMAL
BASOPHILS # BLD: 1 %
BASOPHILS ABSOLUTE: 0.1 K/UL (ref 0–0.2)
BILIRUBIN URINE: NEGATIVE
BNP INTERPRETATION: ABNORMAL
BUN BLDV-MCNC: 9 MG/DL (ref 8–23)
BUN/CREAT BLD: 8 (ref 9–20)
CALCIUM SERPL-MCNC: 10.6 MG/DL (ref 8.6–10.4)
CASTS UA: NORMAL /LPF (ref 0–8)
CHLORIDE BLD-SCNC: 98 MMOL/L (ref 98–107)
CK MB: 3.4 NG/ML
CKMB INTERPRETATION: NORMAL
CO2: 27 MMOL/L (ref 20–31)
COLOR: YELLOW
COMMENT UA: ABNORMAL
CREAT SERPL-MCNC: 1.07 MG/DL (ref 0.5–0.9)
CRYSTALS, UA: NORMAL /HPF
DIFFERENTIAL TYPE: ABNORMAL
EOSINOPHILS RELATIVE PERCENT: 1 % (ref 1–4)
EPITHELIAL CELLS UA: NORMAL /HPF (ref 0–5)
FIO2: 100
GFR AFRICAN AMERICAN: >60 ML/MIN
GFR NON-AFRICAN AMERICAN: 50 ML/MIN
GFR SERPL CREATININE-BSD FRML MDRD: ABNORMAL ML/MIN/{1.73_M2}
GFR SERPL CREATININE-BSD FRML MDRD: ABNORMAL ML/MIN/{1.73_M2}
GLUCOSE BLD-MCNC: 108 MG/DL (ref 70–99)
GLUCOSE URINE: NEGATIVE
HCT VFR BLD CALC: 42.3 % (ref 36.3–47.1)
HEMOGLOBIN: 13.2 G/DL (ref 11.9–15.1)
IMMATURE GRANULOCYTES: 1 %
KETONES, URINE: NEGATIVE
LACTIC ACID, SEPSIS WHOLE BLOOD: ABNORMAL MMOL/L (ref 0.5–1.9)
LACTIC ACID, SEPSIS WHOLE BLOOD: NORMAL MMOL/L (ref 0.5–1.9)
LACTIC ACID, SEPSIS: 1.3 MMOL/L (ref 0.5–1.9)
LACTIC ACID, SEPSIS: 2.5 MMOL/L (ref 0.5–1.9)
LEUKOCYTE ESTERASE, URINE: NEGATIVE
LYMPHOCYTES # BLD: 7 % (ref 24–44)
MAGNESIUM: 1.6 MG/DL (ref 1.6–2.6)
MCH RBC QN AUTO: 31.1 PG (ref 25.2–33.5)
MCHC RBC AUTO-ENTMCNC: 31.2 G/DL (ref 28.4–34.8)
MCV RBC AUTO: 99.8 FL (ref 82.6–102.9)
MONOCYTES # BLD: 5 % (ref 1–7)
MUCUS: NORMAL
MYOGLOBIN: 125 NG/ML (ref 25–58)
NEGATIVE BASE EXCESS, ART: ABNORMAL (ref 0–2)
NITRITE, URINE: NEGATIVE
NRBC AUTOMATED: 0 PER 100 WBC
O2 DEVICE/FLOW/%: ABNORMAL
OTHER OBSERVATIONS UA: NORMAL
PATIENT TEMP: 37
PDW BLD-RTO: 13.6 % (ref 11.8–14.4)
PH UA: 6 (ref 5–8)
PLATELET # BLD: 201 K/UL (ref 138–453)
PLATELET ESTIMATE: ABNORMAL
PMV BLD AUTO: 11.2 FL (ref 8.1–13.5)
POC HCO3: 26.9 MMOL/L (ref 22–27)
POC O2 SATURATION: 99 %
POC PCO2 TEMP: ABNORMAL MM HG
POC PCO2: 48 MM HG (ref 32–45)
POC PH TEMP: ABNORMAL
POC PH: 7.36 (ref 7.35–7.45)
POC PO2 TEMP: ABNORMAL MM HG
POC PO2: 130 MM HG (ref 75–95)
POSITIVE BASE EXCESS, ART: 1 (ref 0–2)
POTASSIUM SERPL-SCNC: 3.7 MMOL/L (ref 3.7–5.3)
PRO-BNP: 469 PG/ML
PROTEIN UA: ABNORMAL
RBC # BLD: 4.24 M/UL (ref 3.95–5.11)
RBC # BLD: ABNORMAL 10*6/UL
RBC UA: NORMAL /HPF (ref 0–4)
RENAL EPITHELIAL, UA: NORMAL /HPF
SEG NEUTROPHILS: 85 % (ref 36–66)
SEGMENTED NEUTROPHILS ABSOLUTE COUNT: 8.23 K/UL (ref 1.8–7.7)
SODIUM BLD-SCNC: 139 MMOL/L (ref 135–144)
SPECIFIC GRAVITY UA: 1.06 (ref 1–1.03)
TCO2 (CALC), ART: 28 MMOL/L (ref 23–28)
TOTAL CK: 123 U/L (ref 26–192)
TRICHOMONAS: NORMAL
TROPONIN INTERP: ABNORMAL
TROPONIN INTERP: NORMAL
TROPONIN T: ABNORMAL NG/ML
TROPONIN T: NORMAL NG/ML
TROPONIN, HIGH SENSITIVITY: 13 NG/L (ref 0–14)
TROPONIN, HIGH SENSITIVITY: 21 NG/L (ref 0–14)
TURBIDITY: CLEAR
URINE HGB: ABNORMAL
UROBILINOGEN, URINE: NORMAL
WBC # BLD: 9.7 K/UL (ref 3.5–11.3)
WBC # BLD: ABNORMAL 10*3/UL
WBC UA: NORMAL /HPF (ref 0–5)
YEAST: NORMAL

## 2020-02-29 PROCEDURE — 71260 CT THORAX DX C+: CPT

## 2020-02-29 PROCEDURE — 82553 CREATINE MB FRACTION: CPT

## 2020-02-29 PROCEDURE — 74018 RADEX ABDOMEN 1 VIEW: CPT

## 2020-02-29 PROCEDURE — 80048 BASIC METABOLIC PNL TOTAL CA: CPT

## 2020-02-29 PROCEDURE — 6360000002 HC RX W HCPCS: Performed by: STUDENT IN AN ORGANIZED HEALTH CARE EDUCATION/TRAINING PROGRAM

## 2020-02-29 PROCEDURE — 6360000004 HC RX CONTRAST MEDICATION: Performed by: EMERGENCY MEDICINE

## 2020-02-29 PROCEDURE — 82803 BLOOD GASES ANY COMBINATION: CPT

## 2020-02-29 PROCEDURE — 36600 WITHDRAWAL OF ARTERIAL BLOOD: CPT

## 2020-02-29 PROCEDURE — 87040 BLOOD CULTURE FOR BACTERIA: CPT

## 2020-02-29 PROCEDURE — 81001 URINALYSIS AUTO W/SCOPE: CPT

## 2020-02-29 PROCEDURE — 82550 ASSAY OF CK (CPK): CPT

## 2020-02-29 PROCEDURE — 99291 CRITICAL CARE FIRST HOUR: CPT | Performed by: INTERNAL MEDICINE

## 2020-02-29 PROCEDURE — 94640 AIRWAY INHALATION TREATMENT: CPT

## 2020-02-29 PROCEDURE — 93005 ELECTROCARDIOGRAM TRACING: CPT

## 2020-02-29 PROCEDURE — 96366 THER/PROPH/DIAG IV INF ADDON: CPT

## 2020-02-29 PROCEDURE — 96365 THER/PROPH/DIAG IV INF INIT: CPT

## 2020-02-29 PROCEDURE — 2000000000 HC ICU R&B

## 2020-02-29 PROCEDURE — 2700000000 HC OXYGEN THERAPY PER DAY

## 2020-02-29 PROCEDURE — 2500000003 HC RX 250 WO HCPCS: Performed by: STUDENT IN AN ORGANIZED HEALTH CARE EDUCATION/TRAINING PROGRAM

## 2020-02-29 PROCEDURE — 94660 CPAP INITIATION&MGMT: CPT

## 2020-02-29 PROCEDURE — 87641 MR-STAPH DNA AMP PROBE: CPT

## 2020-02-29 PROCEDURE — 6360000002 HC RX W HCPCS: Performed by: EMERGENCY MEDICINE

## 2020-02-29 PROCEDURE — 2580000003 HC RX 258: Performed by: EMERGENCY MEDICINE

## 2020-02-29 PROCEDURE — 6370000000 HC RX 637 (ALT 250 FOR IP): Performed by: EMERGENCY MEDICINE

## 2020-02-29 PROCEDURE — 6370000000 HC RX 637 (ALT 250 FOR IP): Performed by: STUDENT IN AN ORGANIZED HEALTH CARE EDUCATION/TRAINING PROGRAM

## 2020-02-29 PROCEDURE — 96375 TX/PRO/DX INJ NEW DRUG ADDON: CPT

## 2020-02-29 PROCEDURE — 2500000003 HC RX 250 WO HCPCS: Performed by: EMERGENCY MEDICINE

## 2020-02-29 PROCEDURE — 71045 X-RAY EXAM CHEST 1 VIEW: CPT

## 2020-02-29 PROCEDURE — 94761 N-INVAS EAR/PLS OXIMETRY MLT: CPT

## 2020-02-29 PROCEDURE — 2580000003 HC RX 258: Performed by: STUDENT IN AN ORGANIZED HEALTH CARE EDUCATION/TRAINING PROGRAM

## 2020-02-29 PROCEDURE — 36415 COLL VENOUS BLD VENIPUNCTURE: CPT

## 2020-02-29 PROCEDURE — 83605 ASSAY OF LACTIC ACID: CPT

## 2020-02-29 PROCEDURE — 83880 ASSAY OF NATRIURETIC PEPTIDE: CPT

## 2020-02-29 PROCEDURE — 96368 THER/DIAG CONCURRENT INF: CPT

## 2020-02-29 PROCEDURE — 99285 EMERGENCY DEPT VISIT HI MDM: CPT

## 2020-02-29 PROCEDURE — 84484 ASSAY OF TROPONIN QUANT: CPT

## 2020-02-29 PROCEDURE — 93005 ELECTROCARDIOGRAM TRACING: CPT | Performed by: EMERGENCY MEDICINE

## 2020-02-29 PROCEDURE — 96367 TX/PROPH/DG ADDL SEQ IV INF: CPT

## 2020-02-29 PROCEDURE — 85025 COMPLETE CBC W/AUTO DIFF WBC: CPT

## 2020-02-29 PROCEDURE — 83735 ASSAY OF MAGNESIUM: CPT

## 2020-02-29 PROCEDURE — 83874 ASSAY OF MYOGLOBIN: CPT

## 2020-02-29 PROCEDURE — C9113 INJ PANTOPRAZOLE SODIUM, VIA: HCPCS | Performed by: STUDENT IN AN ORGANIZED HEALTH CARE EDUCATION/TRAINING PROGRAM

## 2020-02-29 RX ORDER — SODIUM CHLORIDE 0.9 % (FLUSH) 0.9 %
10 SYRINGE (ML) INJECTION PRN
Status: CANCELLED | OUTPATIENT
Start: 2020-02-29

## 2020-02-29 RX ORDER — NICOTINE 21 MG/24HR
1 PATCH, TRANSDERMAL 24 HOURS TRANSDERMAL DAILY
Status: DISCONTINUED | OUTPATIENT
Start: 2020-02-29 | End: 2020-04-03

## 2020-02-29 RX ORDER — SODIUM CHLORIDE 0.9 % (FLUSH) 0.9 %
10 SYRINGE (ML) INJECTION ONCE
Status: COMPLETED | OUTPATIENT
Start: 2020-02-29 | End: 2020-02-29

## 2020-02-29 RX ORDER — GABAPENTIN 300 MG/1
300 CAPSULE ORAL 3 TIMES DAILY
Status: DISCONTINUED | OUTPATIENT
Start: 2020-02-29 | End: 2020-03-21

## 2020-02-29 RX ORDER — ONDANSETRON 2 MG/ML
4 INJECTION INTRAMUSCULAR; INTRAVENOUS EVERY 6 HOURS PRN
Status: CANCELLED | OUTPATIENT
Start: 2020-02-29

## 2020-02-29 RX ORDER — METHYLPREDNISOLONE SODIUM SUCCINATE 125 MG/2ML
125 INJECTION, POWDER, LYOPHILIZED, FOR SOLUTION INTRAMUSCULAR; INTRAVENOUS ONCE
Status: COMPLETED | OUTPATIENT
Start: 2020-02-29 | End: 2020-02-29

## 2020-02-29 RX ORDER — ACETAMINOPHEN 325 MG/1
650 TABLET ORAL EVERY 6 HOURS PRN
Status: DISCONTINUED | OUTPATIENT
Start: 2020-02-29 | End: 2020-03-19

## 2020-02-29 RX ORDER — CLINDAMYCIN PHOSPHATE 900 MG/50ML
900 INJECTION INTRAVENOUS ONCE
Status: COMPLETED | OUTPATIENT
Start: 2020-02-29 | End: 2020-02-29

## 2020-02-29 RX ORDER — CLINDAMYCIN PHOSPHATE 600 MG/50ML
600 INJECTION INTRAVENOUS EVERY 8 HOURS
Status: DISCONTINUED | OUTPATIENT
Start: 2020-02-29 | End: 2020-03-08

## 2020-02-29 RX ORDER — IPRATROPIUM BROMIDE AND ALBUTEROL SULFATE 2.5; .5 MG/3ML; MG/3ML
1 SOLUTION RESPIRATORY (INHALATION) ONCE
Status: COMPLETED | OUTPATIENT
Start: 2020-02-29 | End: 2020-02-29

## 2020-02-29 RX ORDER — METOPROLOL SUCCINATE 25 MG/1
25 TABLET, EXTENDED RELEASE ORAL DAILY
Status: CANCELLED | OUTPATIENT
Start: 2020-02-29

## 2020-02-29 RX ORDER — ESCITALOPRAM OXALATE 10 MG/1
10 TABLET ORAL DAILY
Status: CANCELLED | OUTPATIENT
Start: 2020-02-29

## 2020-02-29 RX ORDER — 0.9 % SODIUM CHLORIDE 0.9 %
30 INTRAVENOUS SOLUTION INTRAVENOUS ONCE
Status: COMPLETED | OUTPATIENT
Start: 2020-02-29 | End: 2020-02-29

## 2020-02-29 RX ORDER — LEVOFLOXACIN 5 MG/ML
500 INJECTION, SOLUTION INTRAVENOUS EVERY 24 HOURS
Status: DISCONTINUED | OUTPATIENT
Start: 2020-02-29 | End: 2020-02-29

## 2020-02-29 RX ORDER — ESCITALOPRAM OXALATE 10 MG/1
20 TABLET ORAL DAILY
Status: DISCONTINUED | OUTPATIENT
Start: 2020-03-01 | End: 2020-04-06 | Stop reason: HOSPADM

## 2020-02-29 RX ORDER — METOPROLOL SUCCINATE 25 MG/1
25 TABLET, EXTENDED RELEASE ORAL DAILY
Status: DISCONTINUED | OUTPATIENT
Start: 2020-02-29 | End: 2020-02-29

## 2020-02-29 RX ORDER — SODIUM CHLORIDE 9 MG/ML
10 INJECTION INTRAVENOUS DAILY
Status: DISCONTINUED | OUTPATIENT
Start: 2020-02-29 | End: 2020-02-29

## 2020-02-29 RX ORDER — AMITRIPTYLINE HYDROCHLORIDE 10 MG/1
10 TABLET, FILM COATED ORAL 3 TIMES DAILY
Status: CANCELLED | OUTPATIENT
Start: 2020-02-29

## 2020-02-29 RX ORDER — FAMOTIDINE 20 MG/1
10 TABLET, FILM COATED ORAL NIGHTLY
Status: CANCELLED | OUTPATIENT
Start: 2020-02-29

## 2020-02-29 RX ORDER — BUSPIRONE HYDROCHLORIDE 10 MG/1
20 TABLET ORAL 3 TIMES DAILY
Status: DISCONTINUED | OUTPATIENT
Start: 2020-02-29 | End: 2020-04-06 | Stop reason: HOSPADM

## 2020-02-29 RX ORDER — MORPHINE SULFATE 2 MG/ML
2 INJECTION, SOLUTION INTRAMUSCULAR; INTRAVENOUS ONCE
Status: COMPLETED | OUTPATIENT
Start: 2020-02-29 | End: 2020-02-29

## 2020-02-29 RX ORDER — ACETAMINOPHEN 650 MG/1
650 SUPPOSITORY RECTAL EVERY 6 HOURS PRN
Status: DISCONTINUED | OUTPATIENT
Start: 2020-02-29 | End: 2020-03-19

## 2020-02-29 RX ORDER — SODIUM CHLORIDE 0.9 % (FLUSH) 0.9 %
10 SYRINGE (ML) INJECTION EVERY 12 HOURS SCHEDULED
Status: CANCELLED | OUTPATIENT
Start: 2020-02-29

## 2020-02-29 RX ORDER — ONDANSETRON 2 MG/ML
4 INJECTION INTRAMUSCULAR; INTRAVENOUS ONCE
Status: COMPLETED | OUTPATIENT
Start: 2020-02-29 | End: 2020-02-29

## 2020-02-29 RX ORDER — PANTOPRAZOLE SODIUM 40 MG/1
40 TABLET, DELAYED RELEASE ORAL
Status: DISCONTINUED | OUTPATIENT
Start: 2020-03-01 | End: 2020-02-29

## 2020-02-29 RX ORDER — ONDANSETRON 4 MG/1
4 TABLET, FILM COATED ORAL DAILY PRN
Status: CANCELLED | OUTPATIENT
Start: 2020-02-29

## 2020-02-29 RX ORDER — 0.9 % SODIUM CHLORIDE 0.9 %
80 INTRAVENOUS SOLUTION INTRAVENOUS ONCE
Status: COMPLETED | OUTPATIENT
Start: 2020-02-29 | End: 2020-02-29

## 2020-02-29 RX ORDER — METOPROLOL SUCCINATE 25 MG/1
25 TABLET, EXTENDED RELEASE ORAL NIGHTLY
Status: DISCONTINUED | OUTPATIENT
Start: 2020-02-29 | End: 2020-04-06 | Stop reason: HOSPADM

## 2020-02-29 RX ORDER — POLYETHYLENE GLYCOL 3350 17 G/17G
17 POWDER, FOR SOLUTION ORAL DAILY PRN
Status: CANCELLED | OUTPATIENT
Start: 2020-02-29

## 2020-02-29 RX ORDER — PANTOPRAZOLE SODIUM 40 MG/10ML
40 INJECTION, POWDER, LYOPHILIZED, FOR SOLUTION INTRAVENOUS DAILY
Status: DISCONTINUED | OUTPATIENT
Start: 2020-02-29 | End: 2020-02-29

## 2020-02-29 RX ORDER — ACETAMINOPHEN 650 MG/1
650 SUPPOSITORY RECTAL EVERY 6 HOURS PRN
Status: CANCELLED | OUTPATIENT
Start: 2020-02-29

## 2020-02-29 RX ORDER — AMITRIPTYLINE HYDROCHLORIDE 10 MG/1
10 TABLET, FILM COATED ORAL 3 TIMES DAILY
Status: DISCONTINUED | OUTPATIENT
Start: 2020-02-29 | End: 2020-03-30

## 2020-02-29 RX ORDER — POLYETHYLENE GLYCOL 3350 17 G/17G
17 POWDER, FOR SOLUTION ORAL DAILY PRN
Status: DISCONTINUED | OUTPATIENT
Start: 2020-02-29 | End: 2020-03-27

## 2020-02-29 RX ORDER — SODIUM CHLORIDE 9 MG/ML
10 INJECTION INTRAVENOUS DAILY
Status: DISCONTINUED | OUTPATIENT
Start: 2020-03-01 | End: 2020-04-06 | Stop reason: HOSPADM

## 2020-02-29 RX ORDER — METOPROLOL TARTRATE 5 MG/5ML
5 INJECTION INTRAVENOUS EVERY 6 HOURS PRN
Status: DISCONTINUED | OUTPATIENT
Start: 2020-02-29 | End: 2020-04-06 | Stop reason: HOSPADM

## 2020-02-29 RX ORDER — ONDANSETRON 2 MG/ML
4 INJECTION INTRAMUSCULAR; INTRAVENOUS EVERY 6 HOURS PRN
Status: DISCONTINUED | OUTPATIENT
Start: 2020-02-29 | End: 2020-04-06 | Stop reason: HOSPADM

## 2020-02-29 RX ORDER — AMLODIPINE BESYLATE 10 MG/1
10 TABLET ORAL NIGHTLY
Status: DISCONTINUED | OUTPATIENT
Start: 2020-02-29 | End: 2020-04-06 | Stop reason: HOSPADM

## 2020-02-29 RX ORDER — IPRATROPIUM BROMIDE AND ALBUTEROL SULFATE 2.5; .5 MG/3ML; MG/3ML
1 SOLUTION RESPIRATORY (INHALATION)
Status: DISCONTINUED | OUTPATIENT
Start: 2020-02-29 | End: 2020-03-04

## 2020-02-29 RX ORDER — GABAPENTIN 300 MG/1
300 CAPSULE ORAL 3 TIMES DAILY
Status: CANCELLED | OUTPATIENT
Start: 2020-02-29

## 2020-02-29 RX ORDER — BUSPIRONE HYDROCHLORIDE 5 MG/1
7.5 TABLET ORAL 3 TIMES DAILY
Status: DISCONTINUED | OUTPATIENT
Start: 2020-02-29 | End: 2020-02-29

## 2020-02-29 RX ORDER — PANTOPRAZOLE SODIUM 40 MG/10ML
40 INJECTION, POWDER, LYOPHILIZED, FOR SOLUTION INTRAVENOUS 2 TIMES DAILY
Status: DISCONTINUED | OUTPATIENT
Start: 2020-02-29 | End: 2020-03-09

## 2020-02-29 RX ORDER — SODIUM CHLORIDE 0.9 % (FLUSH) 0.9 %
10 SYRINGE (ML) INJECTION PRN
Status: DISCONTINUED | OUTPATIENT
Start: 2020-02-29 | End: 2020-04-06 | Stop reason: HOSPADM

## 2020-02-29 RX ORDER — LEVOFLOXACIN 5 MG/ML
500 INJECTION, SOLUTION INTRAVENOUS ONCE
Status: COMPLETED | OUTPATIENT
Start: 2020-02-29 | End: 2020-02-29

## 2020-02-29 RX ORDER — BUSPIRONE HYDROCHLORIDE 5 MG/1
7.5 TABLET ORAL 3 TIMES DAILY
Status: CANCELLED | OUTPATIENT
Start: 2020-02-29

## 2020-02-29 RX ORDER — SODIUM CHLORIDE, SODIUM LACTATE, POTASSIUM CHLORIDE, CALCIUM CHLORIDE 600; 310; 30; 20 MG/100ML; MG/100ML; MG/100ML; MG/100ML
INJECTION, SOLUTION INTRAVENOUS CONTINUOUS
Status: DISCONTINUED | OUTPATIENT
Start: 2020-02-29 | End: 2020-03-15

## 2020-02-29 RX ORDER — FAMOTIDINE 20 MG/1
10 TABLET, FILM COATED ORAL NIGHTLY
Status: DISCONTINUED | OUTPATIENT
Start: 2020-02-29 | End: 2020-02-29

## 2020-02-29 RX ORDER — TRAZODONE HYDROCHLORIDE 100 MG/1
100 TABLET ORAL NIGHTLY
Status: DISCONTINUED | OUTPATIENT
Start: 2020-02-29 | End: 2020-02-29

## 2020-02-29 RX ORDER — BISACODYL 10 MG
10 SUPPOSITORY, RECTAL RECTAL DAILY PRN
Status: DISCONTINUED | OUTPATIENT
Start: 2020-02-29 | End: 2020-03-04

## 2020-02-29 RX ORDER — SODIUM CHLORIDE 0.9 % (FLUSH) 0.9 %
10 SYRINGE (ML) INJECTION EVERY 12 HOURS SCHEDULED
Status: DISCONTINUED | OUTPATIENT
Start: 2020-02-29 | End: 2020-04-06 | Stop reason: HOSPADM

## 2020-02-29 RX ORDER — ACETAMINOPHEN 325 MG/1
650 TABLET ORAL EVERY 6 HOURS PRN
Status: CANCELLED | OUTPATIENT
Start: 2020-02-29

## 2020-02-29 RX ORDER — ESCITALOPRAM OXALATE 10 MG/1
10 TABLET ORAL DAILY
Status: DISCONTINUED | OUTPATIENT
Start: 2020-02-29 | End: 2020-02-29

## 2020-02-29 RX ADMIN — LEVOFLOXACIN 500 MG: 5 INJECTION, SOLUTION INTRAVENOUS at 05:02

## 2020-02-29 RX ADMIN — ONDANSETRON 4 MG: 2 INJECTION INTRAMUSCULAR; INTRAVENOUS at 19:32

## 2020-02-29 RX ADMIN — GABAPENTIN 300 MG: 300 CAPSULE ORAL at 13:11

## 2020-02-29 RX ADMIN — AZTREONAM 1 G: 1 INJECTION, POWDER, LYOPHILIZED, FOR SOLUTION INTRAMUSCULAR; INTRAVENOUS at 22:16

## 2020-02-29 RX ADMIN — Medication 2 SPRAY: at 14:50

## 2020-02-29 RX ADMIN — SODIUM CHLORIDE, POTASSIUM CHLORIDE, SODIUM LACTATE AND CALCIUM CHLORIDE: 600; 310; 30; 20 INJECTION, SOLUTION INTRAVENOUS at 12:30

## 2020-02-29 RX ADMIN — IPRATROPIUM BROMIDE AND ALBUTEROL SULFATE 1 AMPULE: .5; 3 SOLUTION RESPIRATORY (INHALATION) at 15:05

## 2020-02-29 RX ADMIN — PANTOPRAZOLE SODIUM 40 MG: 40 INJECTION, POWDER, FOR SOLUTION INTRAVENOUS at 22:11

## 2020-02-29 RX ADMIN — LEVOFLOXACIN 500 MG: 5 INJECTION, SOLUTION INTRAVENOUS at 12:30

## 2020-02-29 RX ADMIN — IOPAMIDOL 75 ML: 755 INJECTION, SOLUTION INTRAVENOUS at 06:20

## 2020-02-29 RX ADMIN — ACETAMINOPHEN 650 MG: 325 TABLET ORAL at 19:22

## 2020-02-29 RX ADMIN — BUSPIRONE HYDROCHLORIDE 20 MG: 10 TABLET ORAL at 22:11

## 2020-02-29 RX ADMIN — BISACODYL 10 MG: 10 SUPPOSITORY RECTAL at 18:38

## 2020-02-29 RX ADMIN — CLINDAMYCIN IN 5 PERCENT DEXTROSE 600 MG: 12 INJECTION, SOLUTION INTRAVENOUS at 14:46

## 2020-02-29 RX ADMIN — AMITRIPTYLINE HYDROCHLORIDE 10 MG: 10 TABLET, FILM COATED ORAL at 22:11

## 2020-02-29 RX ADMIN — AMITRIPTYLINE HYDROCHLORIDE 10 MG: 10 TABLET, FILM COATED ORAL at 13:11

## 2020-02-29 RX ADMIN — ONDANSETRON 4 MG: 2 INJECTION INTRAMUSCULAR; INTRAVENOUS at 04:37

## 2020-02-29 RX ADMIN — MORPHINE SULFATE 2 MG: 2 INJECTION, SOLUTION INTRAMUSCULAR; INTRAVENOUS at 04:38

## 2020-02-29 RX ADMIN — IPRATROPIUM BROMIDE AND ALBUTEROL SULFATE 1 AMPULE: .5; 3 SOLUTION RESPIRATORY (INHALATION) at 20:00

## 2020-02-29 RX ADMIN — IPRATROPIUM BROMIDE AND ALBUTEROL SULFATE 1 AMPULE: .5; 3 SOLUTION RESPIRATORY (INHALATION) at 04:31

## 2020-02-29 RX ADMIN — SODIUM CHLORIDE, PRESERVATIVE FREE 10 ML: 5 INJECTION INTRAVENOUS at 22:11

## 2020-02-29 RX ADMIN — GABAPENTIN 300 MG: 300 CAPSULE ORAL at 22:11

## 2020-02-29 RX ADMIN — VANCOMYCIN HYDROCHLORIDE 2500 MG: 1 INJECTION, POWDER, LYOPHILIZED, FOR SOLUTION INTRAVENOUS at 06:35

## 2020-02-29 RX ADMIN — BUSPIRONE HYDROCHLORIDE 20 MG: 10 TABLET ORAL at 13:10

## 2020-02-29 RX ADMIN — SODIUM CHLORIDE 1000 ML: 9 INJECTION, SOLUTION INTRAVENOUS at 04:38

## 2020-02-29 RX ADMIN — AZTREONAM 1 G: 1 INJECTION, POWDER, LYOPHILIZED, FOR SOLUTION INTRAMUSCULAR; INTRAVENOUS at 14:45

## 2020-02-29 RX ADMIN — METHYLPREDNISOLONE SODIUM SUCCINATE 125 MG: 125 INJECTION, POWDER, FOR SOLUTION INTRAMUSCULAR; INTRAVENOUS at 04:37

## 2020-02-29 RX ADMIN — SODIUM CHLORIDE 80 ML: 0.9 INJECTION, SOLUTION INTRAVENOUS at 05:45

## 2020-02-29 RX ADMIN — CLINDAMYCIN PHOSPHATE 900 MG: 900 INJECTION, SOLUTION INTRAVENOUS at 05:12

## 2020-02-29 RX ADMIN — Medication 10 ML: at 06:20

## 2020-02-29 RX ADMIN — CLINDAMYCIN IN 5 PERCENT DEXTROSE 600 MG: 12 INJECTION, SOLUTION INTRAVENOUS at 23:30

## 2020-02-29 ASSESSMENT — PULMONARY FUNCTION TESTS
PIF_VALUE: 17
PIF_VALUE: 18
PIF_VALUE: 18
PIF_VALUE: 17

## 2020-02-29 ASSESSMENT — PAIN DESCRIPTION - PAIN TYPE: TYPE: ACUTE PAIN

## 2020-02-29 ASSESSMENT — PAIN SCALES - GENERAL
PAINLEVEL_OUTOF10: 0
PAINLEVEL_OUTOF10: 9
PAINLEVEL_OUTOF10: 9
PAINLEVEL_OUTOF10: 3

## 2020-02-29 ASSESSMENT — PAIN DESCRIPTION - LOCATION: LOCATION: HEAD

## 2020-02-29 NOTE — CONSULTS
Active member of club or organization: Not on file     Attends meetings of clubs or organizations: Not on file     Relationship status: Not on file    Intimate partner violence:     Fear of current or ex partner: Not on file     Emotionally abused: Not on file     Physically abused: Not on file     Forced sexual activity: Not on file   Other Topics Concern    Not on file   Social History Narrative    Not on file       Family History:       Problem Relation Age of Onset    Cancer Mother         uterine    Heart Attack Mother     Heart Attack Father     Other Maternal Grandfather         foot gangrene    Other Paternal Grandmother         bleeding ulcers    Other Paternal Grandfather         lung cancer       REVIEW OF SYSTEMS:    General: Denies fever, chills   HEENT: Denies sore throat  Cardiovascular: Denies chest pain  Pulmonary: +For SOB; denies productive cough   GI:  Denies significant abdominal pain but reports distention and an episode of emesis; Denies any BM  : Denies hematuria  Endocrine: Denies diabetes, thyroid problems.   Hem/Onc: Denies hx of bleeding disorders  Neurological: Denies h/o CVA, TIA  Skin: Denies rashes, ulcers  Musculoskeletal: Denies muscle, joint, back pain      PHYSICAL EXAM:    VITALS:  BP (!) 110/56   Pulse 76   Temp 99.5 °F (37.5 °C) (Core)   Resp 24   Ht 5' 2\" (1.575 m)   Wt 196 lb 3.4 oz (89 kg)   SpO2 95%   BMI 35.89 kg/m²     CONSTITUTIONAL: awake, alert, NAD, BIPAP mask in place  HEAD: atraumatic, normocephalic  EYES: sclera clear  ENT: ears are symmetric, nares patent  NECK: Supple, symmetrical, trachea midline  LUNGS: mild increased WOB ; no audible wheezing or crackles   CARDIOVASCULAR: +S1/S2  ABDOMEN: softly distended, no peritoneal signs; incisions C/D/I with some bruising present but no drainage and healing well, TTP at incision sites  MUSCULOSKELETAL: full range of motion noted  NEUROLOGIC: Awake, alert, oriented to name, place and time  EXTREMITIES: peripheral pulses normal  SKIN: normal coloration and turgor      CBC:   Lab Results   Component Value Date    WBC 9.7 02/29/2020    RBC 4.24 02/29/2020    HGB 13.2 02/29/2020    HCT 42.3 02/29/2020    MCV 99.8 02/29/2020    MCH 31.1 02/29/2020    MCHC 31.2 02/29/2020    RDW 13.6 02/29/2020     02/29/2020    MPV 11.2 02/29/2020     BMP:    Lab Results   Component Value Date     02/29/2020    K 3.7 02/29/2020    CL 98 02/29/2020    CO2 27 02/29/2020    BUN 9 02/29/2020    CREATININE 1.07 02/29/2020    CALCIUM 10.6 02/29/2020    GFRAA >60 02/29/2020    LABGLOM 50 02/29/2020    GLUCOSE 108 02/29/2020       Pertinent Radiology:     Ct Chest Pulmonary Embolism W Contrast  Result Date: 2/29/2020  1. No evidence of pulmonary embolism. 2.  Bilateral small pleural effusions. Basilar atelectasis. 3.  Consolidative process in the left lower lobe, with interval worsening since prior examination. 4.  Patchy ground-glass infiltrates involving both upper lobes and superior segment of the right middle lobe, with interval worsening since prior study. 5.  Status post hiatal hernia repair with patulous esophagus. Other findings as above. Ct Chest Pulmonary Embolism W Contrast  Result Date: 2/26/2020  No evidence of large or central pulmonary embolism; small peripheral nonobstructing PE could be missed on this study, especially to the lower lobes. Hazy ground-glass and atelectatic changes upper lobes, more on the right; developing infectious or inflammatory causes should be included. Bilat pleural effusions with considerable mostly lower lobe volume loss. Abnormal appearing esophagus status post reported hiatal hernia repair; some distal thickening and recurrent hiatal hernia suspected. Mild emphysema. Additional findings, as noted in the body of the report. RECOMMENDATIONS: Clinical correlation to the above with appropriate imaging follow-up.              ASSESSMENT     Respiratory distress

## 2020-02-29 NOTE — ED PROVIDER NOTES
(ELAVIL) 10 MG tablet Take 10 mg by mouth three times dailyHistorical Med      amLODIPine (NORVASC) 10 MG tablet Take 10 mg by mouth nightlyHistorical Med      Oyster Shell 500 MG TABS Take 500 mg by mouth 2 times daily Historical Med      clonazePAM (KLONOPIN) 0.5 MG tablet Take 0.5 mg by mouth 2 times daily. Taking 1/2 tablet BIDHistorical Med      busPIRone (BUSPAR) 7.5 MG tablet Take 7.5 mg by mouth 3 times daily Taking 20 mg TID. Historical Med      polyethylene glycol (MIRALAX) PACK packet Take 17 g by mouth daily as neededHistorical Med      Acetaminophen 500 MG CAPS Take 1 tablet by mouth as needed for Pain (follow OTC instructions)Historical Med      calcium carbonate (TUMS) 500 MG chewable tablet Take 1 tablet by mouth 3 times daily as needed for HeartburnHistorical Med      escitalopram (LEXAPRO) 10 MG tablet Take 1 tablet by mouth daily, Disp-30 tablet, R-0NO PRINT      metoprolol succinate (TOPROL XL) 25 MG extended release tablet take 1 tablet by mouth once daily, R-0Historical Med      gabapentin (NEURONTIN) 300 MG capsule Take 300 mg by mouth 3 times daily. simvastatin (ZOCOR) 40 MG tablet Take 40 mg by mouth nightly.       traZODone (DESYREL) 150 MG tablet Take 100 mg by mouth nightly Historical Med             PAST MEDICAL HISTORY         Diagnosis Date    Anxiety     Arthritis     Back pain     Bronchitis     Caffeine use     8 coffee / day    Carpal tunnel syndrome     Cataracts, bilateral     Constipation     Depression     Diarrhea     GERD (gastroesophageal reflux disease)     Hyperlipidemia     Hypertension     Mumps     MVP (mitral valve prolapse)     Dr. Sarah Workman in May 2019    Recurrent UTI     Dr. Concepcion Estrada    Sinusitis     Ulcerative esophagitis     Wellness examination     Dr. Effie Wagner seen in Jan 2020       SURGICAL HISTORY           Procedure Laterality Date    APPENDECTOMY      CARPAL TUNNEL RELEASE      COLONOSCOPY      CYSTOSCOPY      EYE SURGERY      patricia IOL    GASTRIC FUNDOPLICATION N/A 5151    XI LAPAROSCOPIC ROBOTIC HIATAL HERNIA REPAIR, CHERYL FUNDOPLICATION performed by Kennedi Diaz MD at [address]  2020    LAPAROSCOPIC ROBOTIC HIATAL HERNIA REPAIR, CHERYL FUNDOPLICATION     HYSTERECTOMY      Abdominal    JOINT REPLACEMENT Right     right hip    OVARY REMOVAL      RHINOPLASTY      TONSILLECTOMY      TOTAL HIP ARTHROPLASTY      TOTAL NEPHRECTOMY      atrophic from infections, age 13   [facility] TUBAL LIGATION           FAMILY HISTORY           Problem Relation Age of Onset    Cancer Mother         uterine    Heart Attack Mother     Heart Attack Father     Other Maternal Grandfather         foot gangrene    Other Paternal Grandmother         bleeding ulcers    Other Paternal Grandfather         lung cancer     Family Status   Relation Name Status    Mother     [facility] [employer] Father     [employer] Sister     [employer] MGM      MGF      PGM      PGF      Sister          SOCIAL HISTORY      reports that she has been smoking cigarettes. She has a 50.00 pack-year smoking history. She has never used smokeless tobacco. She reports that she does not drink alcohol or use drugs. REVIEW OF SYSTEMS    (2-9 systems for level 4, 10 or more for level 5)     Review of Systems   All other systems reviewed and are negative. Except as noted above the remainder of the review of systems was reviewed and negative. PHYSICAL EXAM    (up to 7 for level 4, 8 or more for level 5)     Vitals:    20 0836 20 0851 20 0906 20 0921   BP: 121/83 126/81 131/85 125/82   Pulse: 78 79 80 80   Resp:  20   Temp:       TempSrc:       SpO2: 94% 94% 95% 95%   Weight:       Height:           Physical exam reflects a female in distress. She is currently afebrile she is tachypneic tachycardic. She is hypoxic on arrival.  She is mildly cyanotic. Integument is dry. Bun/Cre Ratio 8 (*)     Calcium 10.6 (*)     GFR Non- 50 (*)     All other components within normal limits   BRAIN NATRIURETIC PEPTIDE - Abnormal; Notable for the following components:    Pro- (*)     All other components within normal limits   CBC WITH AUTO DIFFERENTIAL - Abnormal; Notable for the following components:    Seg Neutrophils 85 (*)     Lymphocytes 7 (*)     Immature Granulocytes 1 (*)     Segs Absolute 8.23 (*)     Absolute Lymph # 0.68 (*)     All other components within normal limits   LACTATE, SEPSIS - Abnormal; Notable for the following components:    Lactic Acid, Sepsis 2.5 (*)     All other components within normal limits   TROP/MYOGLOBIN - Abnormal; Notable for the following components:    Troponin, High Sensitivity 21 (*)     Myoglobin 125 (*)     All other components within normal limits   POC PANEL (G3)-ART - Abnormal; Notable for the following components:    POC pCO2 48 (*)     POC PO2 130 (*)     All other components within normal limits   CULTURE, BLOOD 1   CULTURE, BLOOD 1   LACTATE, SEPSIS   CK ISOENZYMES   MAGNESIUM   URINALYSIS       All other labs were within normal range or not returned as of this dictation. EMERGENCY DEPARTMENT COURSE and DIFFERENTIAL DIAGNOSIS/MDM:   Vitals:    Vitals:    02/29/20 0836 02/29/20 0851 02/29/20 0906 02/29/20 0921   BP: 121/83 126/81 131/85 125/82   Pulse: 78 79 80 80   Resp: 21 20 20 20   Temp:       TempSrc:       SpO2: 94% 94% 95% 95%   Weight:       Height:         Patient was evaluated on arrival she was placed on BiPAP which she tolerates well and her vital signs significantly improved. She is afebrile. Chest x-ray demonstrates postoperative change possible pleural effusion on the left and appearance of pneumonia on the right. Blood cultures were obtained as is lactic acid. She is started on antibiotic coverage. She did receive fluid bolus. Investigations confirm her worsening pneumonia.   Patient been stable on

## 2020-02-29 NOTE — PLAN OF CARE
Problem: Pain:  Goal: Pain level will decrease  Description  Pain level will decrease  Outcome: Met This Shift  Goal: Control of acute pain  Description  Control of acute pain  Outcome: Met This Shift     Problem: Anxiety/Stress:  Goal: Level of anxiety will decrease  Description  Level of anxiety will decrease  Outcome: Met This Shift

## 2020-02-29 NOTE — ED NOTES
Pt taken off bi-pap for a drink, her oxygen saturation dropped into upper 70's. Pt placed back on bi-pap and sats zia into the mid 90's.      Madai Nicole RN  02/29/20 8109

## 2020-02-29 NOTE — ED NOTES
Pt to ED via EMS for respiratory distress. Upon arrival to home EMS state that patient o2 saturation was 50%, pt was placed on CPAP and only came up to 70%. Upon arrival pt is in apparent distress, 70% on cpap, Rachel BOWENS places patient on bipap immediately, SPO2 90%. Pt is A&Ox4, tachypneic, skin cool & dry, able to answer questions appropriately. R side lungs C/D. L side lung sounds very diminished. ST on monitor, EKG obtained.  Will monitor      Oumou Oneill RN  02/29/20 5563

## 2020-02-29 NOTE — PROGRESS NOTES
Pharmacy Note  Vancomycin Consult    Anuj Ram is a 76 y.o. female started on Vancomycin for aspiration pneumonia prophylaxis; consult received from Dr. Ludivina Christopher to manage therapy. Also receiving the following antibiotics: levaquin. Patient Active Problem List   Diagnosis    Frequency of urination    Severe sepsis (HCC)    Back pain    GERD (gastroesophageal reflux disease)    MVP (mitral valve prolapse)    Depression    Anxiety    Urine retention    Acute kidney injury ([address]    Esophageal dysphagia    Paraesophageal hernia    History of repair of hiatal hernia    Aspiration pneumonia (HCC)    Respiratory failure ([facility])       Allergies:  Compazine [prochlorperazine maleate]; Penicillin v potassium; and Penicillins     Temp max: 99.5 F    Recent Labs     02/27/20  0537 02/29/20  0433   BUN 5* 9       Recent Labs     02/27/20  0537 02/29/20  0433   CREATININE 0.81 1.07*       Recent Labs     02/27/20  0537 02/29/20  0433   WBC 9.4 9.7         Intake/Output Summary (Last 24 hours) at 2/29/2020 1224  Last data filed at 2/29/2020 1219  Gross per 24 hour   Intake --   Output 400 ml   Net -400 ml       Culture Date      Source                       Results  N/a    Ht Readings from Last 1 Encounters:   02/29/20 5' 2\" (1.575 m)        Wt Readings from Last 1 Encounters:   02/29/20 196 lb 3.4 oz (89 kg)         Body mass index is 35.89 kg/m². Estimated Creatinine Clearance: 48 mL/min (A) (based on SCr of 1.07 mg/dL (H)). Goal Trough Level: 15-20 mcg/mL    Assessment/Plan:  Patient received 2500mg loading dose before being transferred to 's. Will initiate vancomycin 1250 mg IV every 24 hours starting tomorrow. Timing of trough level will be determined based on culture results, renal function, and clinical response. Thank you for the consult. Will continue to follow.      Kylah OronaD

## 2020-02-29 NOTE — H&P
Critical Care - History and Physical Examination    Patient's name:  Maya Jung  Medical Record Number: [id number]  Patient's account/billing number: [de-identified]  Patient's YOB: 1945  Age: 76 y.o. Date of Admission: 2/29/2020 10:37 AM  Date of History and Physical Examination: 2/29/2020      Primary Care Physician: Lalita Neves MD      Code Status: Full Code    Chief complaint: ACUTE RESPIRATORY FAILURE; UPPER GI BLEED; RECENT NISSEN    HISTORY OF PRESENT ILLNESS:       Maya Jung is a 76 y. o. with PMH of presentation to [facility] from home after d/c [place] from [facility]'s s/p nissen fundoplication on 7/03/29 with bariatrics by Dr. Maida Castro. Pt came with worsening SOB since d/c, complaint of dark red emesis this morning. At outside hospital she was placed on Bipap after arriving with O2 sats 60%. Bp and HR were stable, but pt was quite dependent on bipap to maintain sat. CT-PE showed bilateral pleural effusions, no PE. Pt was afebrile, WBC 9.7; hg 13.2; creatine slightly over 1. Was started on vanc, levaquin, clindamycin due to penicillin allergy. Blood cultures were taken and the pt was transferred to [facility] ICU for impending resp failure with surgery consulted. Admission ABG showed pH of 7.36, PCO2 48of , HCO3 of 26.9* and Lactic of 1.3    [facility] [employer] felt the patient was having issues maintaining O2 levels on high fio2 during transit, but pt saturating well with good volumes during transition to Bipap in the ICU.       Past Medical History:        Diagnosis Date    Anxiety     Arthritis     Back pain     Bronchitis     Caffeine use     8 coffee / day    Carpal tunnel syndrome     Cataracts, bilateral     Constipation     Depression     Diarrhea     GERD (gastroesophageal reflux disease)     Hyperlipidemia     Hypertension     Mumps     MVP (mitral valve prolapse)     Dr. Nancy Amezcua in May 2019    Recurrent UTI     Dr. Joselin Shrestha    Sinusitis     Ulcerative esophagitis     Negative for lightheadedness ,chest pain, palpitations   · Respiratory:increased SOB; hypoxia; cough  · Gastrointestinal: positive for emesis with dark red material; some n/v; little abdominal pain; constipation. · Genitourinary:urinary retention  · Musculoskeletal: Negative for joint pain   · Neurological: Negative for headache, change in muscle strength numbness/tingling    Physical Exam:    Vitals: /65   Pulse 83   Temp 99.3 °F (37.4 °C) (Core)   Resp 18   Ht 5' 2\" (1.575 m)   Wt 196 lb 3.4 oz (89 kg)   SpO2 96%   BMI 35.89 kg/m²     Last Body weight:   Wt Readings from Last 3 Encounters:   02/29/20 196 lb 3.4 oz (89 kg)   02/29/20 186 lb 1.1 oz (84.4 kg)   02/24/20 183 lb 6.4 oz (83.2 kg)       Body Mass Index : Body mass index is 35.89 kg/m². PHYSICAL EXAMINATION :  · General appearance: awake, alert, cooperative on bipap  · HEENT: Head: Normocephalic, no lesions, without obvious abnormality. · Lungs: clear to auscultation bilaterally  · Heart: regular rate and rhythm, S1, S2 normal, no murmur  · Abdomen: well-healing surgical scars;   · Extremities: extremities normal, atraumatic, no cyanosis or edema  · Neurological:  Awake, alert, oriented to name, place and time. Cranial nerves II-XII are grossly intact. Reflexes normal and symmetric.  Sensation grossly normal  · Eye no icterus no redness    Any additional physical findings:    VENT SETTINGS (Comprehensive) (if applicable):  Vent Information  Vent Type: Servo i  Vent Mode: NIV/PC  Rate Set: 15 bmp  FiO2 : 60 %  PEEP/CPAP: 7  I Time/ I Time %: 0.9 s  Additional Respiratory  Assessments  Pulse: 83  Resp: 18  SpO2: 96 %    Laboratory findings:-    CBC:   Recent Labs     02/29/20 0433   WBC 9.7   HGB 13.2        BMP:    Recent Labs     02/27/20  0537 02/29/20 0433    139   K 4.1 3.7    98   CO2 24 27   BUN 5* 9   CREATININE 0.81 1.07*   GLUCOSE 96 108*     S. Calcium:  Recent Labs     02/29/20 0433   CALCIUM 10.6* view of the chest.  Patient rotation to the left. Normal lung volume. Prominent pulmonary vasculature. Bibasilar heterogeneous opacities. No lobar consolidation. No pleural effusion or pneumothorax. Grossly stable cardiomediastinal silhouette and great vessels given patient rotation with redemonstration of cardiomegaly. Stable regional skeleton. Cardiomegaly and pulmonary vascular congestion. Bibasilar heterogeneous opacities may represent subsegmental atelectasis or mild pulmonary edema. Superimposed infection is difficult to entirely exclude. Ct Chest Pulmonary Embolism W Contrast    Result Date: 2/29/2020  EXAMINATION: CTA OF THE CHEST 2/29/2020 5:26 am TECHNIQUE: CTA of the chest was performed after the administration of intravenous contrast.  Multiplanar reformatted images are provided for review. MIP images are provided for review. Dose modulation, iterative reconstruction, and/or weight based adjustment of the mA/kV was utilized to reduce the radiation dose to as low as reasonably achievable. COMPARISON: 26 February 2020 HISTORY: ORDERING SYSTEM PROVIDED HISTORY: Chest Pain r/o PE TECHNOLOGIST PROVIDED HISTORY: Chest Pain r/o PE FINDINGS: Pulmonary Arteries: Pulmonary arteries are adequately opacified for evaluation. No evidence of intraluminal filling defect to suggest pulmonary embolism. Main pulmonary artery is normal in caliber. Mediastinum: Esophagus is dilated and fluid-filled. Mild mediastinal adenopathy is noted with 12 mm pretracheal and borderline size pre-vascular lymph nodes. No acute aortic abnormality. Cardiomegaly is present. No pericardial effusion or epicardial adenopathy is noted. Lungs/pleura: Small bilateral pleural effusions are noted. There is consolidative process involving much of the left lower lobe with patchy ground-glass infiltrates in the upper lobes, and superior aspect of the right middle lobe. Atelectasis is evident.  Upper Abdomen: Status post hiatal patchy areas of infiltrate in upper lung field and also a small right pleural effusion with mild atelectasis previously seen right basilar atelectasis improved but worsening left lower lobe atelectasis/consolidation. CT scan of the chest was also showed severe esophageal dilatation, surgery service has been consulted, also abdominal x-ray was concerning for ileus. In [facility] hospital she was kept on noninvasive ventilation on arrival to ICU she was alert and awake on noninvasive ventilation she was alert awake on my exam she was able to talk complete sentences and not in distress but tachypneic she was maintaining saturation 94 to 96% on 60% FiO2 with pressure control of 10 and PEEP of 7. Arterial blood gas incident shows 7.3 6/48/130/27    She does have also hypovolemia with increasing creatinine especially she had CT scan twice both with contrast for PE protocol on 02/2620 and also today 02/29/2020. She never was diagnosed with COPD but she had long history of smoking 50 pack year and CT scan of the chest shows centrilobular mild emphysematous changes present. Continue with noninvasive ventilation. Will watch closely for aspiration/vomiting. I would advise to keep n.p.o. but will leave it up to surgery service. Hold antihypertensive medication. IV fluids to continue monitor urine output. Saldana's catheter. May try with high flow nasal cannula in between the noninvasive ventilation. DuoNeb to start. Monitor blood sugar. Surgery service has been consulted. Encourage deep breathing incentive spirometry and cough while she is off noninvasive ventilation. Chest x-ray tomorrow. Total critical care time caring for this patient with life threatening, unstable organ failure, including direct patient contact, management of life support systems, review of data including imaging and labs, discussions with other team members and physicians at least 48  Min so far today, excluding procedures.       Please note that this chart was generated using voice recognition Dragon dictation software. Although every effort was made to ensure the accuracy of this automated transcription, some errors in transcription may have occurred.       Alyssia Parks MD  2/29/2020 12:56 PM

## 2020-03-01 ENCOUNTER — APPOINTMENT (OUTPATIENT)
Dept: GENERAL RADIOLOGY | Age: 75
DRG: 004 | End: 2020-03-01
Attending: INTERNAL MEDICINE
Payer: MEDICARE

## 2020-03-01 ENCOUNTER — APPOINTMENT (OUTPATIENT)
Dept: CT IMAGING | Age: 75
DRG: 004 | End: 2020-03-01
Attending: INTERNAL MEDICINE
Payer: MEDICARE

## 2020-03-01 ENCOUNTER — ANESTHESIA EVENT (OUTPATIENT)
Dept: ICU | Age: 75
DRG: 004 | End: 2020-03-01
Payer: MEDICARE

## 2020-03-01 ENCOUNTER — ANESTHESIA (OUTPATIENT)
Dept: ICU | Age: 75
DRG: 004 | End: 2020-03-01
Payer: MEDICARE

## 2020-03-01 LAB
ABSOLUTE EOS #: 0 K/UL (ref 0–0.4)
ABSOLUTE IMMATURE GRANULOCYTE: 0.1 K/UL (ref 0–0.3)
ABSOLUTE LYMPH #: 0.3 K/UL (ref 1–4.8)
ABSOLUTE MONO #: 0.4 K/UL (ref 0.1–0.8)
ALBUMIN SERPL-MCNC: 2.4 G/DL (ref 3.5–5.2)
ALBUMIN/GLOBULIN RATIO: 0.7 (ref 1–2.5)
ALLEN TEST: POSITIVE
ALP BLD-CCNC: 80 U/L (ref 35–104)
ALT SERPL-CCNC: 193 U/L (ref 5–33)
ANION GAP SERPL CALCULATED.3IONS-SCNC: 15 MMOL/L (ref 9–17)
AST SERPL-CCNC: 22 U/L
BASOPHILS # BLD: 0 % (ref 0–2)
BASOPHILS ABSOLUTE: 0 K/UL (ref 0–0.2)
BILIRUB SERPL-MCNC: 0.34 MG/DL (ref 0.3–1.2)
BUN BLDV-MCNC: 10 MG/DL (ref 8–23)
BUN/CREAT BLD: ABNORMAL (ref 9–20)
CALCIUM SERPL-MCNC: 9.6 MG/DL (ref 8.6–10.4)
CHLORIDE BLD-SCNC: 104 MMOL/L (ref 98–107)
CO2: 22 MMOL/L (ref 20–31)
CREAT SERPL-MCNC: 0.79 MG/DL (ref 0.5–0.9)
DIFFERENTIAL TYPE: ABNORMAL
EOSINOPHILS RELATIVE PERCENT: 0 % (ref 1–4)
FIO2: 60
GFR AFRICAN AMERICAN: >60 ML/MIN
GFR NON-AFRICAN AMERICAN: >60 ML/MIN
GFR SERPL CREATININE-BSD FRML MDRD: ABNORMAL ML/MIN/{1.73_M2}
GFR SERPL CREATININE-BSD FRML MDRD: ABNORMAL ML/MIN/{1.73_M2}
GLUCOSE BLD-MCNC: 110 MG/DL (ref 70–99)
HCT VFR BLD CALC: 35.4 % (ref 36.3–47.1)
HCT VFR BLD CALC: 35.8 % (ref 36.3–47.1)
HEMOGLOBIN: 10.8 G/DL (ref 11.9–15.1)
HEMOGLOBIN: 11.3 G/DL (ref 11.9–15.1)
IMMATURE GRANULOCYTES: 1 %
LYMPHOCYTES # BLD: 3 % (ref 24–44)
MAGNESIUM: 1.8 MG/DL (ref 1.6–2.6)
MCH RBC QN AUTO: 30.4 PG (ref 25.2–33.5)
MCHC RBC AUTO-ENTMCNC: 30.2 G/DL (ref 28.4–34.8)
MCV RBC AUTO: 100.8 FL (ref 82.6–102.9)
MODE: ABNORMAL
MONOCYTES # BLD: 4 % (ref 1–7)
MORPHOLOGY: NORMAL
MRSA, DNA, NASAL: NORMAL
NEGATIVE BASE EXCESS, ART: ABNORMAL (ref 0–2)
NRBC AUTOMATED: 0 PER 100 WBC
O2 DEVICE/FLOW/%: ABNORMAL
PATIENT TEMP: 38
PDW BLD-RTO: 13.7 % (ref 11.8–14.4)
PHOSPHORUS: 3.6 MG/DL (ref 2.6–4.5)
PLATELET # BLD: 164 K/UL (ref 138–453)
PLATELET ESTIMATE: ABNORMAL
PMV BLD AUTO: 11 FL (ref 8.1–13.5)
POC HCO3: 28.8 MMOL/L (ref 21–28)
POC O2 SATURATION: 93 % (ref 94–98)
POC PCO2 TEMP: 49 MM HG
POC PCO2: 46.4 MM HG (ref 35–48)
POC PH TEMP: 7.39
POC PH: 7.4 (ref 7.35–7.45)
POC PO2 TEMP: 73 MM HG
POC PO2: 67.8 MM HG (ref 83–108)
POSITIVE BASE EXCESS, ART: 3 (ref 0–3)
POTASSIUM SERPL-SCNC: 4.1 MMOL/L (ref 3.7–5.3)
RBC # BLD: 3.55 M/UL (ref 3.95–5.11)
RBC # BLD: ABNORMAL 10*6/UL
SAMPLE SITE: ABNORMAL
SEG NEUTROPHILS: 92 % (ref 36–66)
SEGMENTED NEUTROPHILS ABSOLUTE COUNT: 9.2 K/UL (ref 1.8–7.7)
SODIUM BLD-SCNC: 141 MMOL/L (ref 135–144)
SPECIMEN DESCRIPTION: NORMAL
TCO2 (CALC), ART: 30 MMOL/L (ref 22–29)
TOTAL PROTEIN: 5.7 G/DL (ref 6.4–8.3)
TROPONIN INTERP: NORMAL
TROPONIN T: NORMAL NG/ML
TROPONIN, HIGH SENSITIVITY: 12 NG/L (ref 0–14)
WBC # BLD: 10 K/UL (ref 3.5–11.3)
WBC # BLD: ABNORMAL 10*3/UL

## 2020-03-01 PROCEDURE — 6360000002 HC RX W HCPCS

## 2020-03-01 PROCEDURE — C9113 INJ PANTOPRAZOLE SODIUM, VIA: HCPCS | Performed by: STUDENT IN AN ORGANIZED HEALTH CARE EDUCATION/TRAINING PROGRAM

## 2020-03-01 PROCEDURE — 99291 CRITICAL CARE FIRST HOUR: CPT | Performed by: INTERNAL MEDICINE

## 2020-03-01 PROCEDURE — 82803 BLOOD GASES ANY COMBINATION: CPT

## 2020-03-01 PROCEDURE — 94770 HC ETCO2 MONITOR DAILY: CPT

## 2020-03-01 PROCEDURE — 36600 WITHDRAWAL OF ARTERIAL BLOOD: CPT

## 2020-03-01 PROCEDURE — 84100 ASSAY OF PHOSPHORUS: CPT

## 2020-03-01 PROCEDURE — 6360000002 HC RX W HCPCS: Performed by: STUDENT IN AN ORGANIZED HEALTH CARE EDUCATION/TRAINING PROGRAM

## 2020-03-01 PROCEDURE — 36415 COLL VENOUS BLD VENIPUNCTURE: CPT

## 2020-03-01 PROCEDURE — 6370000000 HC RX 637 (ALT 250 FOR IP): Performed by: STUDENT IN AN ORGANIZED HEALTH CARE EDUCATION/TRAINING PROGRAM

## 2020-03-01 PROCEDURE — 94640 AIRWAY INHALATION TREATMENT: CPT

## 2020-03-01 PROCEDURE — 93005 ELECTROCARDIOGRAM TRACING: CPT | Performed by: STUDENT IN AN ORGANIZED HEALTH CARE EDUCATION/TRAINING PROGRAM

## 2020-03-01 PROCEDURE — 5A1955Z RESPIRATORY VENTILATION, GREATER THAN 96 CONSECUTIVE HOURS: ICD-10-PCS | Performed by: INTERNAL MEDICINE

## 2020-03-01 PROCEDURE — 80053 COMPREHEN METABOLIC PANEL: CPT

## 2020-03-01 PROCEDURE — 0BH18EZ INSERTION OF ENDOTRACHEAL AIRWAY INTO TRACHEA, VIA NATURAL OR ARTIFICIAL OPENING ENDOSCOPIC: ICD-10-PCS | Performed by: ANESTHESIOLOGY

## 2020-03-01 PROCEDURE — 85014 HEMATOCRIT: CPT

## 2020-03-01 PROCEDURE — 2700000000 HC OXYGEN THERAPY PER DAY

## 2020-03-01 PROCEDURE — 85025 COMPLETE CBC W/AUTO DIFF WBC: CPT

## 2020-03-01 PROCEDURE — 2000000000 HC ICU R&B

## 2020-03-01 PROCEDURE — 2500000003 HC RX 250 WO HCPCS: Performed by: STUDENT IN AN ORGANIZED HEALTH CARE EDUCATION/TRAINING PROGRAM

## 2020-03-01 PROCEDURE — 31500 INSERT EMERGENCY AIRWAY: CPT | Performed by: ANESTHESIOLOGY

## 2020-03-01 PROCEDURE — 6360000004 HC RX CONTRAST MEDICATION: Performed by: STUDENT IN AN ORGANIZED HEALTH CARE EDUCATION/TRAINING PROGRAM

## 2020-03-01 PROCEDURE — 31500 INSERT EMERGENCY AIRWAY: CPT

## 2020-03-01 PROCEDURE — 71260 CT THORAX DX C+: CPT

## 2020-03-01 PROCEDURE — 2580000003 HC RX 258: Performed by: STUDENT IN AN ORGANIZED HEALTH CARE EDUCATION/TRAINING PROGRAM

## 2020-03-01 PROCEDURE — 94660 CPAP INITIATION&MGMT: CPT

## 2020-03-01 PROCEDURE — 71045 X-RAY EXAM CHEST 1 VIEW: CPT

## 2020-03-01 PROCEDURE — 6360000004 HC RX CONTRAST MEDICATION: Performed by: SURGERY

## 2020-03-01 PROCEDURE — 94761 N-INVAS EAR/PLS OXIMETRY MLT: CPT

## 2020-03-01 PROCEDURE — 6370000000 HC RX 637 (ALT 250 FOR IP): Performed by: SURGERY

## 2020-03-01 PROCEDURE — 84484 ASSAY OF TROPONIN QUANT: CPT

## 2020-03-01 PROCEDURE — 85018 HEMOGLOBIN: CPT

## 2020-03-01 PROCEDURE — 83735 ASSAY OF MAGNESIUM: CPT

## 2020-03-01 PROCEDURE — 94002 VENT MGMT INPAT INIT DAY: CPT

## 2020-03-01 RX ORDER — FENTANYL CITRATE 50 UG/ML
50 INJECTION, SOLUTION INTRAMUSCULAR; INTRAVENOUS
Status: DISCONTINUED | OUTPATIENT
Start: 2020-03-01 | End: 2020-03-09

## 2020-03-01 RX ORDER — ONDANSETRON 2 MG/ML
4 INJECTION INTRAMUSCULAR; INTRAVENOUS ONCE
Status: COMPLETED | OUTPATIENT
Start: 2020-03-01 | End: 2020-03-01

## 2020-03-01 RX ORDER — FENTANYL CITRATE 50 UG/ML
50 INJECTION, SOLUTION INTRAMUSCULAR; INTRAVENOUS ONCE
Status: COMPLETED | OUTPATIENT
Start: 2020-03-01 | End: 2020-03-01

## 2020-03-01 RX ORDER — ALBUTEROL SULFATE 2.5 MG/3ML
2.5 SOLUTION RESPIRATORY (INHALATION) 2 TIMES DAILY
Status: DISCONTINUED | OUTPATIENT
Start: 2020-03-02 | End: 2020-03-04

## 2020-03-01 RX ORDER — LORAZEPAM 2 MG/ML
0.5 INJECTION INTRAMUSCULAR ONCE
Status: DISCONTINUED | OUTPATIENT
Start: 2020-03-01 | End: 2020-03-01

## 2020-03-01 RX ORDER — SCOLOPAMINE TRANSDERMAL SYSTEM 1 MG/1
1 PATCH, EXTENDED RELEASE TRANSDERMAL
Status: DISCONTINUED | OUTPATIENT
Start: 2020-03-01 | End: 2020-03-02 | Stop reason: SDUPTHER

## 2020-03-01 RX ORDER — CLONAZEPAM 0.5 MG/1
0.5 TABLET ORAL 2 TIMES DAILY
Status: DISCONTINUED | OUTPATIENT
Start: 2020-03-01 | End: 2020-04-06 | Stop reason: HOSPADM

## 2020-03-01 RX ORDER — FENTANYL CITRATE 50 UG/ML
INJECTION, SOLUTION INTRAMUSCULAR; INTRAVENOUS
Status: COMPLETED
Start: 2020-03-01 | End: 2020-03-01

## 2020-03-01 RX ORDER — SCOLOPAMINE TRANSDERMAL SYSTEM 1 MG/1
1 PATCH, EXTENDED RELEASE TRANSDERMAL
Status: DISCONTINUED | OUTPATIENT
Start: 2020-03-01 | End: 2020-03-06

## 2020-03-01 RX ORDER — PROPOFOL 10 MG/ML
10 INJECTION, EMULSION INTRAVENOUS
Status: DISCONTINUED | OUTPATIENT
Start: 2020-03-01 | End: 2020-03-02

## 2020-03-01 RX ORDER — FENTANYL CITRATE 50 UG/ML
100 INJECTION, SOLUTION INTRAMUSCULAR; INTRAVENOUS ONCE
Status: COMPLETED | OUTPATIENT
Start: 2020-03-01 | End: 2020-03-01

## 2020-03-01 RX ORDER — PROPOFOL 10 MG/ML
INJECTION, EMULSION INTRAVENOUS
Status: COMPLETED
Start: 2020-03-01 | End: 2020-03-01

## 2020-03-01 RX ORDER — TRAZODONE HYDROCHLORIDE 100 MG/1
100 TABLET ORAL NIGHTLY
Status: DISCONTINUED | OUTPATIENT
Start: 2020-03-01 | End: 2020-04-06 | Stop reason: HOSPADM

## 2020-03-01 RX ADMIN — FENTANYL CITRATE 50 MCG: 50 INJECTION, SOLUTION INTRAMUSCULAR; INTRAVENOUS at 13:38

## 2020-03-01 RX ADMIN — ESCITALOPRAM OXALATE 20 MG: 10 TABLET ORAL at 07:52

## 2020-03-01 RX ADMIN — BUSPIRONE HYDROCHLORIDE 20 MG: 10 TABLET ORAL at 07:51

## 2020-03-01 RX ADMIN — PANTOPRAZOLE SODIUM 40 MG: 40 INJECTION, POWDER, FOR SOLUTION INTRAVENOUS at 21:43

## 2020-03-01 RX ADMIN — SODIUM CHLORIDE, POTASSIUM CHLORIDE, SODIUM LACTATE AND CALCIUM CHLORIDE: 600; 310; 30; 20 INJECTION, SOLUTION INTRAVENOUS at 00:59

## 2020-03-01 RX ADMIN — PROPOFOL 10 MCG/KG/MIN: 10 INJECTION, EMULSION INTRAVENOUS at 20:35

## 2020-03-01 RX ADMIN — FENTANYL CITRATE 50 MCG: 50 INJECTION, SOLUTION INTRAMUSCULAR; INTRAVENOUS at 18:03

## 2020-03-01 RX ADMIN — TRAZODONE HYDROCHLORIDE 100 MG: 100 TABLET ORAL at 01:00

## 2020-03-01 RX ADMIN — IOHEXOL 75 ML: 350 INJECTION, SOLUTION INTRAVENOUS at 18:22

## 2020-03-01 RX ADMIN — IPRATROPIUM BROMIDE AND ALBUTEROL SULFATE 1 AMPULE: .5; 3 SOLUTION RESPIRATORY (INHALATION) at 11:45

## 2020-03-01 RX ADMIN — IPRATROPIUM BROMIDE AND ALBUTEROL SULFATE 1 AMPULE: .5; 3 SOLUTION RESPIRATORY (INHALATION) at 08:06

## 2020-03-01 RX ADMIN — IOHEXOL 50 ML: 240 INJECTION, SOLUTION INTRATHECAL; INTRAVASCULAR; INTRAVENOUS; ORAL at 17:14

## 2020-03-01 RX ADMIN — ONDANSETRON 4 MG: 2 INJECTION INTRAMUSCULAR; INTRAVENOUS at 13:39

## 2020-03-01 RX ADMIN — GABAPENTIN 300 MG: 300 CAPSULE ORAL at 07:52

## 2020-03-01 RX ADMIN — FENTANYL CITRATE 50 MCG: 50 INJECTION, SOLUTION INTRAMUSCULAR; INTRAVENOUS at 20:46

## 2020-03-01 RX ADMIN — CLINDAMYCIN IN 5 PERCENT DEXTROSE 600 MG: 12 INJECTION, SOLUTION INTRAVENOUS at 13:19

## 2020-03-01 RX ADMIN — Medication 25 MCG/HR: at 21:34

## 2020-03-01 RX ADMIN — PANTOPRAZOLE SODIUM 40 MG: 40 INJECTION, POWDER, FOR SOLUTION INTRAVENOUS at 07:51

## 2020-03-01 RX ADMIN — GABAPENTIN 300 MG: 300 CAPSULE ORAL at 13:21

## 2020-03-01 RX ADMIN — IPRATROPIUM BROMIDE AND ALBUTEROL SULFATE 1 AMPULE: .5; 3 SOLUTION RESPIRATORY (INHALATION) at 16:27

## 2020-03-01 RX ADMIN — ONDANSETRON 4 MG: 2 INJECTION INTRAMUSCULAR; INTRAVENOUS at 18:05

## 2020-03-01 RX ADMIN — CLINDAMYCIN IN 5 PERCENT DEXTROSE 600 MG: 12 INJECTION, SOLUTION INTRAVENOUS at 22:45

## 2020-03-01 RX ADMIN — CLINDAMYCIN IN 5 PERCENT DEXTROSE 600 MG: 12 INJECTION, SOLUTION INTRAVENOUS at 06:20

## 2020-03-01 RX ADMIN — ONDANSETRON 4 MG: 2 INJECTION INTRAMUSCULAR; INTRAVENOUS at 04:42

## 2020-03-01 RX ADMIN — CLONAZEPAM 0.5 MG: 0.5 TABLET ORAL at 08:54

## 2020-03-01 RX ADMIN — ONDANSETRON 4 MG: 2 INJECTION INTRAMUSCULAR; INTRAVENOUS at 12:02

## 2020-03-01 RX ADMIN — BUSPIRONE HYDROCHLORIDE 20 MG: 10 TABLET ORAL at 13:20

## 2020-03-01 RX ADMIN — AMITRIPTYLINE HYDROCHLORIDE 10 MG: 10 TABLET, FILM COATED ORAL at 13:21

## 2020-03-01 RX ADMIN — Medication 10 ML: at 07:54

## 2020-03-01 RX ADMIN — MAGNESIUM HYDROXIDE 30 ML: 400 SUSPENSION ORAL at 08:09

## 2020-03-01 RX ADMIN — IPRATROPIUM BROMIDE AND ALBUTEROL SULFATE 1 AMPULE: .5; 3 SOLUTION RESPIRATORY (INHALATION) at 19:33

## 2020-03-01 RX ADMIN — SODIUM CHLORIDE, PRESERVATIVE FREE 10 ML: 5 INJECTION INTRAVENOUS at 07:53

## 2020-03-01 RX ADMIN — BISACODYL 10 MG: 10 SUPPOSITORY RECTAL at 14:42

## 2020-03-01 RX ADMIN — ALBUTEROL SULFATE 2.5 MG: 2.5 SOLUTION RESPIRATORY (INHALATION) at 23:13

## 2020-03-01 RX ADMIN — FENTANYL CITRATE 100 MCG: 50 INJECTION, SOLUTION INTRAMUSCULAR; INTRAVENOUS at 16:45

## 2020-03-01 RX ADMIN — AMITRIPTYLINE HYDROCHLORIDE 10 MG: 10 TABLET, FILM COATED ORAL at 07:51

## 2020-03-01 RX ADMIN — AZTREONAM 1 G: 1 INJECTION, POWDER, LYOPHILIZED, FOR SOLUTION INTRAMUSCULAR; INTRAVENOUS at 06:53

## 2020-03-01 RX ADMIN — SODIUM CHLORIDE, PRESERVATIVE FREE 10 ML: 5 INJECTION INTRAVENOUS at 21:43

## 2020-03-01 RX ADMIN — Medication 1 MG/HR: at 21:05

## 2020-03-01 RX ADMIN — AZTREONAM 1 G: 1 INJECTION, POWDER, LYOPHILIZED, FOR SOLUTION INTRAMUSCULAR; INTRAVENOUS at 13:19

## 2020-03-01 RX ADMIN — ONDANSETRON 4 MG: 2 INJECTION INTRAMUSCULAR; INTRAVENOUS at 09:55

## 2020-03-01 RX ADMIN — AZTREONAM 1 G: 1 INJECTION, POWDER, LYOPHILIZED, FOR SOLUTION INTRAMUSCULAR; INTRAVENOUS at 21:46

## 2020-03-01 ASSESSMENT — PAIN SCALES - GENERAL
PAINLEVEL_OUTOF10: 9
PAINLEVEL_OUTOF10: 0

## 2020-03-01 ASSESSMENT — PULMONARY FUNCTION TESTS
PIF_VALUE: 15
PIF_VALUE: 14
PIF_VALUE: 24
PIF_VALUE: 15
PIF_VALUE: 24
PIF_VALUE: 15

## 2020-03-01 NOTE — PROGRESS NOTES
Surgery Progress Note            PATIENT NAME: Garland Mena     TODAY'S DATE: 3/1/2020, 8:10 AM    SUBJECTIVE:    Pt S+E. No acute events overnight. Had small BM per nurse. Patient states she has some heartburn this AM.  Denies CP and current SOB on HFNC. States she has some nausea but no vomiting this AM.  Denies abdominal pain. Denies fevers and chills      OBJECTIVE:   VITALS:  BP 97/68   Pulse 78   Temp 98.5 °F (36.9 °C) (Oral)   Resp 24   Ht 5' 2\" (1.575 m)   Wt 196 lb 3.4 oz (89 kg)   SpO2 94%   BMI 35.89 kg/m²      INTAKE/OUTPUT:      Intake/Output Summary (Last 24 hours) at 3/1/2020 0810  Last data filed at 3/1/2020 0600  Gross per 24 hour   Intake 1910.5 ml   Output 2630 ml   Net -719.5 ml       PHYSICAL EXAM  Constitutional:  Vital signs are normal. The patient appears well-developed and well-nourished. HEENT:      Head: Normocephalic. Atraumatic     Eyes: pupils are equal and reactive. No scleral icterus is present. Neck: No mass and no thyromegaly present. Cardiovascular: Normal rate, regular rhythm, S1 normal and S2 normal.  Bilateral pulses present. Pulmonary/Chest: Effort normal and breath sounds normal. No retractions. Abdominal: Soft. Incisions look fine. Tender at incisions. Musculoskeletal:      Right lower leg: Normal. No tenderness and no edema. Left lower leg: Normal. No tenderness and no edema. Lymphadenopathy:     No cervical adenopathy, No Exrtemity Adenopathy. Neurological: The patient is alert and oriented. Moving all four extremities equally, sensation grossly intact bilateral.  Skin: Skin is warm, dry and intact. Respiratory distress secondary to atelectasis vs PNA  Abdominal distention likely secondary to post op ileus  POD#5 hiatal hernia repair w/ syeda fundoplication (significantly large hiatal hernia)  BENEDICT    1. Keep NPO   2. Gentle IVF hydration   3. Soap suds enema not tolerated, given magnesium citrate  4.  Admitted to ICU under critical care due to pulmonary concerns. Transitioned from BIPAP to HFNC this AM . Duoneb q4h. Clinda IV per crit care. 5.  BENEDICT resolved  6.   Will follow    Electronically signed by Ricky Espinal DO  on 3/1/2020 at 8:10 AM

## 2020-03-01 NOTE — DISCHARGE INSTR - COC
cmH20    Rehab Therapies: {THERAPEUTIC INTERVENTION:[id number]}  Weight Bearing Status/Restrictions: No weight bearing restirctions  Other Medical Equipment (for information only, NOT a DME order):  hospital bed  Other Treatments: ***    Patient's personal belongings (please select all that are sent with patient):  Glasses    RN SIGNATURE:  Electronically signed by Corey Cruz RN on 4/6/2020 at 10:39 AM  CASE MANAGEMENT/SOCIAL WORK SECTION    Inpatient Status Date: 02/29/2020    Readmission Risk Assessment Score:  Readmission Risk              Risk of Unplanned Readmission:        12           Discharging to Facility/ Agency   · [address] [facility]    Dialysis Facility (if applicable)   · Name:  · Address:  · Dialysis Schedule:  · Phone:  · Fax:    / signature:Electronically signed by Brock Sanchez RN on 4/6/2020 at 8:14 AM      PHYSICIAN SECTION    Prognosis: Fair    Condition at Discharge: Stable    Rehab Potential (if transferring to Rehab): Fair    Recommended Labs or Other Treatments After Discharge:   TPN as per surgery / PCP  Patient has hypotensive episodes with opioid analgesics  Not tolerating tube feeds  Tolerating CPAP      Physician Certification: I certify the above information and transfer of Mortimer Guys  is necessary for the continuing treatment of the diagnosis listed and that she requires LTAC for greater 30 days.      Update Admission H&P: No change in H&P    PHYSICIAN SIGNATURE:  Electronically signed by Robert Escobedo MD on 4/4/20 at 10:18 AM EDT

## 2020-03-01 NOTE — PROGRESS NOTES
Anuradha De La O, PPatient Assessment complete. Aspiration pneumonia, unspecified aspiration pneumonia type, unspecified laterality, unspecified part [facility] [J69.0] . Vitals:    02/29/20 2002   BP:    Pulse:    Resp: 20   Temp:    SpO2: 94%      Pt has a hx of smoking, here for SOB and hypoxia.      RR 20  Breath Sounds: clear, dim      · Bronchodilator assessment at level  3  [x]    Bronchodilator Assessment  BRONCHODILATOR ASSESSMENT SCORE  Score 0 1 2 3 4 5   Breath Sounds   []  Patient Baseline []  No Wheeze good aeration [x]  Faint, scattered wheezing, good aeration []  Expiratory Wheezing and or moderately diminished []  Insp/Exp wheeze and/or very diminished []  Insp/Exp and/ or marked distress   Respiratory Rate   []  Patient Baseline []  Less than 20 []  Less than 20 [x]  20-25 []  Greater than 25 []  Greater than 25   Peak flow % of Pred or PB []  NA   []  Greater than 90%  []  81-90% []  71-80% []  Less than or equal to 70%  or unable to perform []  Unable due to Respiratory Distress   Dyspnea re []  Patient Baseline []  No SOB []  No SOB [x]  SOB on exertion []  SOB min activity []  At rest/acute   e FEV% Predicted       []  NA []  Above 69%  []  Unable []  Above 60-69%  []  Unable []  Above 50-59%  []  Unable []  Above 35-49%  []  Unable []  Less than 35%  []  Unable

## 2020-03-01 NOTE — PLAN OF CARE
BRONCHOSPASM/BRONCHOCONSTRICTION     [x]         IMPROVE AERATION/BREATH SOUNDS  [x]   ADMINISTER BRONCHODILATOR THERAPY AS APPROPRIATE  [x]   ASSESS BREATH SOUNDS  [x]   IMPLEMENT AEROSOL/MDI PROTOCOL  [x]   PATIENT EDUCATION AS NEEDED    PROVIDE ADEQUATE OXYGENATION WITH ACCEPTABLE SP02/ABG'S    [x]  IDENTIFY APPROPRIATE OXYGEN THERAPY  [x]   MONITOR SP02/ABG'S AS NEEDED   [x]   PATIENT EDUCATION AS NEEDED    NONINVASIVE VENTILATION    PROVIDE OPTIMAL VENTILATION/ACCEPTABLE SPO2   IMPLEMENT NONINVASIVE VENTILATION PROTOCOL   MAINTAIN ACCEPTABLE SPO2   ASSESS SKIN INTEGRITY/BREAKDOWN SCORE   PATIENT EDUCATION AS NEEDED   BIPAP AS NEEDED

## 2020-03-01 NOTE — PROGRESS NOTES
time. Cranial nerves II-XII are grossly intact. Reflexes normal and symmetric.  Sensation grossly normal  · Eye no icterus no redness  · Psych: Patient teary, anxious    MEDICATIONS:  Scheduled Meds:   traZODone  100 mg Oral Nightly    magnesium citrate  296 mL Oral Once    amitriptyline  10 mg Oral TID    [Held by provider] amLODIPine  10 mg Oral Nightly    gabapentin  300 mg Oral TID    sodium chloride flush  10 mL Intravenous 2 times per day    busPIRone  20 mg Oral TID    [Held by provider] metoprolol succinate  25 mg Oral Nightly    nicotine  1 patch Transdermal Daily    pantoprazole  40 mg Intravenous BID    And    sodium chloride (PF)  10 mL Intravenous Daily    ipratropium-albuterol  1 ampule Inhalation Q4H WA    aztreonam  1 g Intravenous Q8H    clindamycin (CLEOCIN) IV  600 mg Intravenous Q8H    escitalopram  20 mg Oral Daily     Continuous Infusions:   lactated ringers 100 mL/hr at 03/01/20 0059     PRN Meds:   magnesium hydroxide, 30 mL, Daily PRN  bismuth subsalicylate, 30 mL, U6K PRN  sodium chloride flush, 10 mL, PRN  acetaminophen, 650 mg, Q6H PRN    Or  acetaminophen, 650 mg, Q6H PRN  polyethylene glycol, 17 g, Daily PRN  glycerin moisturizing mouth spray, 2 spray, PRN  metoprolol, 5 mg, Q6H PRN  bisacodyl, 10 mg, Daily PRN  ondansetron, 4 mg, Q6H PRN        SUPPORT DEVICES: [] Ventilator [x] BIPAP  [x] Nasal Cannula [] Room Air    VENT SETTINGS (Comprehensive) (if applicable):    Vent Information  Skin Assessment: Clean, dry, & intact  Vent Type: Servo i  Vent Mode: NIV/PC  Pressure Ordered: (S) 8  Rate Set: 15 bmp  FiO2 : (S) 40 %  PEEP/CPAP: 7  I Time/ I Time %: 0.9 s  Humidification Source: Heated wire  Humidification Temp: 31  Additional Respiratory  Assessments  Pulse: 88  Resp: 22  SpO2: 92 %  Humidification Source: Heated wire  Humidification Temp: 31    ABGs:     Lab Results   Component Value Date    PDZ9RRM 28 02/29/2020    FIO2 100.0 02/29/2020     DATA:  Complete Blood XR ABDOMEN (KUB) (SINGLE AP VIEW)   Final Result   Large amount of stool in the proximal colon with gas in the transverse and   proximal descending colon may reflect ileus or constipation. ASSESSMENT:     Patient Active Problem List    Diagnosis Date Noted    Aspiration pneumonia ([facility] 02/29/2020    Respiratory failure ([facility] 02/29/2020    Centrilobular emphysema (HCC)     Atelectasis     Pleural effusion     Esophageal dilatation     History of repair of hiatal hernia 02/27/2020    Paraesophageal hernia 02/24/2020    Esophageal dysphagia 02/04/2020    Urine retention 05/01/2018    Acute kidney injury ([facility] 05/01/2018    Back pain     GERD (gastroesophageal reflux disease)     MVP (mitral valve prolapse)     Depression     Anxiety     [facility] 04/30/2018    Frequency of urination 10/04/2017          PLAN:     WEAN PER PROTOCOL:  [] No   [] Yes  [] N/A    ICU PROPHYLAXIS:  Stress ulcer:  [x] PPI Agent  [x] [id number] [] Sucralfate  [] Other:  VTE:   [x] Enoxaparin -- held for now [] Unfract. Heparin Subcut  [x] EPC Cuffs    NUTRITION:  [] NPO [] Tube Feeding (Specify: ) [] TPN  [x] PO -- sips with meds   HOME MEDS RECONCILED: [] No  [x] Yes    CONSULTATION NEEDED:  [x] No  [] Yes    FAMILY UPDATED:    [x] No  [] Yes    TRANSFER OUT OF ICU:   [] No  [x] Yes    CNS:   mentating well at present  Resume psych meds when able  Elavil; klonopin; lexapro; neurontin;trazadone-restarted home Klonopin  Patient teary this morning    [employer]  Echo with mild mitral regurg; pulmonary HTN  norvasc 10mg; toprol 25 mg q; zocor 40 mg qd -- holding antihypertensive medications for hypotension this morning    Have low index of suspicion for tachycardia and hypotension the patient is having GI bleed    Pulm  CT-PE negative; echo with increased pulm pressures----suggested bilateral pleural effusions; consolidation of LLL, possible post surgical atelectasis or injury vs PNA.    Wean fio2  Intubate

## 2020-03-02 ENCOUNTER — TELEPHONE (OUTPATIENT)
Dept: BARIATRICS/WEIGHT MGMT | Age: 75
End: 2020-03-02

## 2020-03-02 LAB
ALLEN TEST: POSITIVE
ANION GAP SERPL CALCULATED.3IONS-SCNC: 12 MMOL/L (ref 9–17)
BUN BLDV-MCNC: 13 MG/DL (ref 8–23)
BUN/CREAT BLD: ABNORMAL (ref 9–20)
CALCIUM SERPL-MCNC: 8.4 MG/DL (ref 8.6–10.4)
CHLORIDE BLD-SCNC: 103 MMOL/L (ref 98–107)
CO2: 25 MMOL/L (ref 20–31)
CREAT SERPL-MCNC: 0.78 MG/DL (ref 0.5–0.9)
EKG ATRIAL RATE: 94 BPM
EKG P AXIS: 41 DEGREES
EKG P-R INTERVAL: 146 MS
EKG Q-T INTERVAL: 358 MS
EKG QRS DURATION: 74 MS
EKG QTC CALCULATION (BAZETT): 447 MS
EKG R AXIS: 70 DEGREES
EKG T AXIS: 15 DEGREES
EKG VENTRICULAR RATE: 94 BPM
FIO2: 60
GFR AFRICAN AMERICAN: >60 ML/MIN
GFR NON-AFRICAN AMERICAN: >60 ML/MIN
GFR SERPL CREATININE-BSD FRML MDRD: ABNORMAL ML/MIN/{1.73_M2}
GFR SERPL CREATININE-BSD FRML MDRD: ABNORMAL ML/MIN/{1.73_M2}
GLUCOSE BLD-MCNC: 138 MG/DL (ref 70–99)
MAGNESIUM: 2.3 MG/DL (ref 1.6–2.6)
MODE: ABNORMAL
NEGATIVE BASE EXCESS, ART: ABNORMAL (ref 0–2)
O2 DEVICE/FLOW/%: ABNORMAL
PATIENT TEMP: 37.7
POC HCO3: 30.6 MMOL/L (ref 21–28)
POC O2 SATURATION: 98 % (ref 94–98)
POC PCO2 TEMP: 47 MM HG
POC PCO2: 45.2 MM HG (ref 35–48)
POC PH TEMP: 7.43
POC PH: 7.44 (ref 7.35–7.45)
POC PO2 TEMP: 105 MM HG
POC PO2: 100.6 MM HG (ref 83–108)
POSITIVE BASE EXCESS, ART: 6 (ref 0–3)
POTASSIUM SERPL-SCNC: 3.4 MMOL/L (ref 3.7–5.3)
SAMPLE SITE: ABNORMAL
SODIUM BLD-SCNC: 140 MMOL/L (ref 135–144)
TCO2 (CALC), ART: 32 MMOL/L (ref 22–29)

## 2020-03-02 PROCEDURE — 6370000000 HC RX 637 (ALT 250 FOR IP): Performed by: STUDENT IN AN ORGANIZED HEALTH CARE EDUCATION/TRAINING PROGRAM

## 2020-03-02 PROCEDURE — 80048 BASIC METABOLIC PNL TOTAL CA: CPT

## 2020-03-02 PROCEDURE — C9113 INJ PANTOPRAZOLE SODIUM, VIA: HCPCS | Performed by: STUDENT IN AN ORGANIZED HEALTH CARE EDUCATION/TRAINING PROGRAM

## 2020-03-02 PROCEDURE — 6360000002 HC RX W HCPCS: Performed by: STUDENT IN AN ORGANIZED HEALTH CARE EDUCATION/TRAINING PROGRAM

## 2020-03-02 PROCEDURE — 2500000003 HC RX 250 WO HCPCS: Performed by: STUDENT IN AN ORGANIZED HEALTH CARE EDUCATION/TRAINING PROGRAM

## 2020-03-02 PROCEDURE — 2000000000 HC ICU R&B

## 2020-03-02 PROCEDURE — 2700000000 HC OXYGEN THERAPY PER DAY

## 2020-03-02 PROCEDURE — 99291 CRITICAL CARE FIRST HOUR: CPT | Performed by: INTERNAL MEDICINE

## 2020-03-02 PROCEDURE — 94640 AIRWAY INHALATION TREATMENT: CPT

## 2020-03-02 PROCEDURE — 94761 N-INVAS EAR/PLS OXIMETRY MLT: CPT

## 2020-03-02 PROCEDURE — 2580000003 HC RX 258: Performed by: STUDENT IN AN ORGANIZED HEALTH CARE EDUCATION/TRAINING PROGRAM

## 2020-03-02 PROCEDURE — 36600 WITHDRAWAL OF ARTERIAL BLOOD: CPT

## 2020-03-02 PROCEDURE — 94770 HC ETCO2 MONITOR DAILY: CPT

## 2020-03-02 PROCEDURE — 83735 ASSAY OF MAGNESIUM: CPT

## 2020-03-02 PROCEDURE — 94003 VENT MGMT INPAT SUBQ DAY: CPT

## 2020-03-02 PROCEDURE — 82803 BLOOD GASES ANY COMBINATION: CPT

## 2020-03-02 PROCEDURE — 36415 COLL VENOUS BLD VENIPUNCTURE: CPT

## 2020-03-02 PROCEDURE — 93010 ELECTROCARDIOGRAM REPORT: CPT | Performed by: INTERNAL MEDICINE

## 2020-03-02 RX ORDER — POTASSIUM CHLORIDE 7.45 MG/ML
10 INJECTION INTRAVENOUS PRN
Status: DISCONTINUED | OUTPATIENT
Start: 2020-03-02 | End: 2020-04-06 | Stop reason: HOSPADM

## 2020-03-02 RX ORDER — POTASSIUM CHLORIDE 20 MEQ/1
40 TABLET, EXTENDED RELEASE ORAL PRN
Status: DISCONTINUED | OUTPATIENT
Start: 2020-03-02 | End: 2020-04-06 | Stop reason: HOSPADM

## 2020-03-02 RX ORDER — POTASSIUM CHLORIDE 7.45 MG/ML
10 INJECTION INTRAVENOUS
Status: ACTIVE | OUTPATIENT
Start: 2020-03-02 | End: 2020-03-02

## 2020-03-02 RX ADMIN — PANTOPRAZOLE SODIUM 40 MG: 40 INJECTION, POWDER, FOR SOLUTION INTRAVENOUS at 08:28

## 2020-03-02 RX ADMIN — POTASSIUM CHLORIDE 10 MEQ: 7.46 INJECTION, SOLUTION INTRAVENOUS at 10:51

## 2020-03-02 RX ADMIN — Medication 10 ML: at 08:29

## 2020-03-02 RX ADMIN — CLINDAMYCIN IN 5 PERCENT DEXTROSE 600 MG: 12 INJECTION, SOLUTION INTRAVENOUS at 13:22

## 2020-03-02 RX ADMIN — CLINDAMYCIN IN 5 PERCENT DEXTROSE 600 MG: 12 INJECTION, SOLUTION INTRAVENOUS at 06:16

## 2020-03-02 RX ADMIN — AZTREONAM 1 G: 1 INJECTION, POWDER, LYOPHILIZED, FOR SOLUTION INTRAMUSCULAR; INTRAVENOUS at 05:44

## 2020-03-02 RX ADMIN — AZTREONAM 1 G: 1 INJECTION, POWDER, LYOPHILIZED, FOR SOLUTION INTRAMUSCULAR; INTRAVENOUS at 21:09

## 2020-03-02 RX ADMIN — PANTOPRAZOLE SODIUM 40 MG: 40 INJECTION, POWDER, FOR SOLUTION INTRAVENOUS at 20:51

## 2020-03-02 RX ADMIN — ALBUTEROL SULFATE 2.5 MG: 2.5 SOLUTION RESPIRATORY (INHALATION) at 23:15

## 2020-03-02 RX ADMIN — ENOXAPARIN SODIUM 40 MG: 40 INJECTION SUBCUTANEOUS at 08:26

## 2020-03-02 RX ADMIN — BISACODYL 10 MG: 10 SUPPOSITORY RECTAL at 22:22

## 2020-03-02 RX ADMIN — IPRATROPIUM BROMIDE AND ALBUTEROL SULFATE 1 AMPULE: .5; 3 SOLUTION RESPIRATORY (INHALATION) at 11:16

## 2020-03-02 RX ADMIN — POTASSIUM CHLORIDE 10 MEQ: 7.46 INJECTION, SOLUTION INTRAVENOUS at 10:00

## 2020-03-02 RX ADMIN — POTASSIUM CHLORIDE 10 MEQ: 7.46 INJECTION, SOLUTION INTRAVENOUS at 13:02

## 2020-03-02 RX ADMIN — SODIUM CHLORIDE, POTASSIUM CHLORIDE, SODIUM LACTATE AND CALCIUM CHLORIDE: 600; 310; 30; 20 INJECTION, SOLUTION INTRAVENOUS at 20:43

## 2020-03-02 RX ADMIN — Medication 6 MG/HR: at 20:15

## 2020-03-02 RX ADMIN — Medication 2 MCG/MIN: at 13:16

## 2020-03-02 RX ADMIN — CLINDAMYCIN IN 5 PERCENT DEXTROSE 600 MG: 12 INJECTION, SOLUTION INTRAVENOUS at 21:09

## 2020-03-02 RX ADMIN — Medication 100 MCG/HR: at 19:09

## 2020-03-02 RX ADMIN — POTASSIUM CHLORIDE 10 MEQ: 7.46 INJECTION, SOLUTION INTRAVENOUS at 08:17

## 2020-03-02 RX ADMIN — IPRATROPIUM BROMIDE AND ALBUTEROL SULFATE 1 AMPULE: .5; 3 SOLUTION RESPIRATORY (INHALATION) at 19:21

## 2020-03-02 RX ADMIN — IPRATROPIUM BROMIDE AND ALBUTEROL SULFATE 1 AMPULE: .5; 3 SOLUTION RESPIRATORY (INHALATION) at 07:30

## 2020-03-02 RX ADMIN — IPRATROPIUM BROMIDE AND ALBUTEROL SULFATE 1 AMPULE: .5; 3 SOLUTION RESPIRATORY (INHALATION) at 14:59

## 2020-03-02 RX ADMIN — ALBUTEROL SULFATE 2.5 MG: 2.5 SOLUTION RESPIRATORY (INHALATION) at 03:21

## 2020-03-02 RX ADMIN — AZTREONAM 1 G: 1 INJECTION, POWDER, LYOPHILIZED, FOR SOLUTION INTRAMUSCULAR; INTRAVENOUS at 13:21

## 2020-03-02 RX ADMIN — SODIUM CHLORIDE, PRESERVATIVE FREE 10 ML: 5 INJECTION INTRAVENOUS at 20:51

## 2020-03-02 ASSESSMENT — PULMONARY FUNCTION TESTS
PIF_VALUE: 20
PIF_VALUE: 24
PIF_VALUE: 25
PIF_VALUE: 23
PIF_VALUE: 12
PIF_VALUE: 21
PIF_VALUE: 23
PIF_VALUE: 16

## 2020-03-02 ASSESSMENT — PAIN SCALES - GENERAL: PAINLEVEL_OUTOF10: 0

## 2020-03-02 NOTE — TELEPHONE ENCOUNTER
Karson Katz states he spoke with Dr. Momo Shaw a little while ago and would like to speak with him again PACU then floor

## 2020-03-02 NOTE — FLOWSHEET NOTE
03/02/20 1300   Provider Notification   Reason for Communication Evaluate; Review case   Provider Name Dr. Juan M Rausch   Provider Notification Resident   Method of Communication Face to face   Response See orders   Notification Time 1300     Continue to monitor patient closely  Agustin Monteiro

## 2020-03-02 NOTE — ANESTHESIA PROCEDURE NOTES
Airway  Date/Time: 3/1/2020 8:38 PM  Urgency: emergent    Difficult airway    General Information and Staff    Patient location during procedure: ICU    Consent for Airway (if performed for an anesthetic, see related documentation for consents)  Patient identity confirmed: per hospital policy  Consent: The procedure was performed in an emergent situation. Verbal consent not obtained. Written consent not obtained.   Risks and benefits: risks, benefits and alternatives were not discussed      Indications and Patient Condition  Indications for airway management: airway protection, pulmonary toilet and respiratory failure  Spontaneous ventilation: present  Sedation level: minimal  Preoxygenated: yes  MILS not maintained throughout      Final Airway Details  Final airway type: endotracheal airway      Successful airway: ETT  Cuffed: yes   Successful intubation technique: direct laryngoscopy  Facilitating devices/methods: cricoid pressure  Endotracheal tube insertion site: oral  Blade: Ritika  Blade size: #3  ETT size (mm): 7.5  Cormack-Lehane Classification: grade IIa - partial view of glottis  Placement verified by: chest auscultation and capnometry   Inital cuff pressure (cm H2O): 23  Measured from: lips  Number of attempts at approach: 2  Ventilation between attempts: bag mask  Number of other approaches attempted: 2    Other Attempts  Unsuccessful attempted endotracheal techniques: video laryngoscopy    Additional Comments  Pt with pneumonia,aspiration increasing resp distress,on PS,

## 2020-03-02 NOTE — PROGRESS NOTES
following commands   Extremities:  peripheral pulses normal, no pedal edema,.     MEDICATIONS:  Scheduled Meds:   potassium chloride  10 mEq Intravenous Q1H    traZODone  100 mg Oral Nightly    magnesium citrate  296 mL Oral Once    enoxaparin  40 mg Subcutaneous Daily    clonazePAM  0.5 mg Oral BID    scopolamine  1 patch Transdermal Q72H    scopolamine  1 patch Transdermal Q72H    albuterol  2.5 mg Nebulization BID    amitriptyline  10 mg Oral TID    [Held by provider] amLODIPine  10 mg Oral Nightly    gabapentin  300 mg Oral TID    sodium chloride flush  10 mL Intravenous 2 times per day    busPIRone  20 mg Oral TID    [Held by provider] metoprolol succinate  25 mg Oral Nightly    nicotine  1 patch Transdermal Daily    pantoprazole  40 mg Intravenous BID    And    sodium chloride (PF)  10 mL Intravenous Daily    ipratropium-albuterol  1 ampule Inhalation Q4H WA    aztreonam  1 g Intravenous Q8H    clindamycin (CLEOCIN) IV  600 mg Intravenous Q8H    escitalopram  20 mg Oral Daily     Continuous Infusions:   fentaNYL 25 mcg/hr (03/01/20 2134)    midazolam 2 mg/hr (03/01/20 2140)    lactated ringers 50 mL/hr at 03/01/20 1143     PRN Meds:   potassium chloride, 40 mEq, PRN    Or  potassium alternative oral replacement, 40 mEq, PRN    Or  potassium chloride, 10 mEq, PRN  magnesium hydroxide, 30 mL, Daily PRN  bismuth subsalicylate, 30 mL, T0J PRN  fentanNYL, 50 mcg, Q3H PRN  sodium chloride flush, 10 mL, PRN  acetaminophen, 650 mg, Q6H PRN    Or  acetaminophen, 650 mg, Q6H PRN  polyethylene glycol, 17 g, Daily PRN  glycerin moisturizing mouth spray, 2 spray, PRN  metoprolol, 5 mg, Q6H PRN  bisacodyl, 10 mg, Daily PRN  ondansetron, 4 mg, Q6H PRN        SUPPORT DEVICES: [x] Ventilator [] BIPAP  [] Nasal Cannula [] Room Air    VENT SETTINGS (Comprehensive) (if applicable):  Vent Information  $Ventilation: $Initial Day  Skin Assessment: Clean, dry, & intact  Equipment Changed: HME  Vent Type: Servo NEGATIVE 02/29/2020    UROBILINOGEN Normal 02/29/2020    BILIRUBINUR NEGATIVE 02/29/2020    BILIRUBINUR Negative 06/29/2018    BLOODU Non-hemolyzed trace 06/29/2018    GLUCOSEU NEGATIVE 02/29/2020    KETUA NEGATIVE 02/29/2020    AMORPHOUS NOT REPORTED 02/29/2020       CARDIAC ENZYMES:   Recent Labs     02/29/20  0433   CKMB 3.4     BNP: No results for input(s): BNP in the last 72 hours. LFTS  Recent Labs     03/01/20  0432   ALKPHOS 80   *   AST 22   BILITOT 0.34   LABALBU 2.4*     Last 3 Blood Glucose:   Recent Labs     02/29/20  0433 03/01/20  0432 03/02/20  0402   GLUCOSE 108* 110* 138*     Cultures during this admission:     Blood cultures:                 [x] None drawn      [] Negative             []  Positive (Details:  )  Urine Culture:                   [x] None drawn      [] Negative             []  Positive (Details:  )  Sputum Culture:               [x] None drawn       [] Negative             []  Positive (Details:  )   Endotracheal aspirate:     [x] None drawn       [] Negative             []  Positive (Details:  )      ASSESSMENT:     Active Problems:    Aspiration pneumonia (HCC)    Centrilobular emphysema (HCC)    Atelectasis    Pleural effusion    Esophageal dilatation  Resolved Problems:    * No resolved hospital problems. *    PLAN:   1. Status post intubated on 3/1/2020 to prevent risk of aspiration from esophageal dilatation. Currently on fentanyl and Versed. 2. Bilateral lower lobe consolidation with possible aspiration as well. Continue IV aztreonam and IV clindamycin. Continue duonebs and RT aerosol protocol. 3. Centrilobular emphysema. Continue as needed DuoNeb and RT aerosol protocol. 4. Hypertension. Hold antihypertensive due to multiple episodes of hypotension. 5. Status post laparoscopic hiatal hernia repair and Nissen fundoplication on 2/80/9292. Bariatric following. 6. Esophageal dilatation likely due to #4. Bariatric following.   7. Mild BENEDICT likely due to

## 2020-03-02 NOTE — PLAN OF CARE
Problem: OXYGENATION/RESPIRATORY FUNCTION  Goal: Patient will maintain patent airway  3/2/2020 0922 by Fallon Paul RCP  Outcome: Ongoing  3/2/2020 0624 by Michelle Cabral RN  Outcome: Ongoing  3/1/2020 2040 by Michaela Arevalo RCP  Outcome: Ongoing     Problem: OXYGENATION/RESPIRATORY FUNCTION  Goal: Patient will achieve/maintain normal respiratory rate/effort  Description  Respiratory rate and effort will be within normal limits for the patient  3/2/2020 4090 by Fallon Paul RCP  Outcome: Ongoing  3/2/2020 0624 by Michelle Cabral RN  Outcome: Ongoing  3/1/2020 2040 by Michaela Arevalo RCP  Outcome: Ongoing     Problem: MECHANICAL VENTILATION  Goal: Patient will maintain patent airway  3/2/2020 0922 by Fallon Paul RCP  Outcome: Ongoing  3/2/2020 0624 by Michelle Cabral RN  Outcome: Ongoing  3/1/2020 2040 by Michaela Arevalo RCP  Outcome: Ongoing     Problem: MECHANICAL VENTILATION  Goal: Oral health is maintained or improved  3/2/2020 0922 by Fallon Paul RCP  Outcome: Ongoing  3/2/2020 0624 by Michelle Cabral RN  Outcome: Ongoing  3/1/2020 2040 by Michaela Arevalo RCP  Outcome: Ongoing     Problem: MECHANICAL VENTILATION  Goal: ET tube will be managed safely  3/2/2020 0922 by Fallon Paul RCP  Outcome: Ongoing  3/2/2020 0624 by Michelle Cabral RN  Outcome: Ongoing  3/1/2020 2040 by Michaela Arevalo RCP  Outcome: Ongoing     Problem: MECHANICAL VENTILATION  Goal: Ability to express needs and understand communication  3/2/2020 0922 by Fallon Paul RCP  Outcome: Ongoing  3/2/2020 0624 by Michelle Cabral RN  Outcome: Ongoing  3/1/2020 2040 by Michaela Arevalo RCP  Outcome: Ongoing     Problem: MECHANICAL VENTILATION  Goal: Mobility/activity is maintained at optimum level for patient  3/2/2020 8271 by Fallon Paul RCP  Outcome: Ongoing  3/2/2020 0624 by Michelle Cabral RN  Outcome: Ongoing  3/1/2020 2040 by Michaela Arevalo RCP  Outcome: Ongoing

## 2020-03-02 NOTE — FLOWSHEET NOTE
03/02/20 1130   Provider Notification   Reason for Communication Evaluate; Review case   Provider Name Dr. Astrid Alejandre   Provider Notification Physician   Method of Communication Face to face   Response At bedside   Notification Time    Continue to monitor patient closely  Agustin Monteiro RN

## 2020-03-02 NOTE — PROGRESS NOTES
Patient seen and examined at bedside. Discussed CT scan findings in depth with patient. Discussed worsening of her bilateral pneumonia and a dilated/distended esophagus with contrast material present in the esophagus. Overall we discussed that since the patient is requiring more O2 requirements, she has increased from high flow nasal cannula now to BiPAP mask that we should prophylactically intubate her due to this very large dilated contrast-filled esophagus and her worsening O2 status. Patient was in agreement, we discussed these findings with her son over the phone. The anesthesia team was then contacted. The patient then underwent rapid sequence intubation. After successful intubation and a 16 Algerian OG tube was placed 25 cm into her esophagus and 400 mL contrast fluid was removed. Discussed with critical care resident at bedside. Critical care team to manage ventilator. Recommend OG tube be taped at the lip at 25 cm and to not be advanced.       Electronically signed by Myla Rodriguez DO on 3/1/2020 at 8:42 PM

## 2020-03-02 NOTE — FLOWSHEET NOTE
03/02/20 1100   Provider Notification   Reason for Communication Evaluate; Review case   Provider Name Dr. Brodie Thurston   Provider Notification Physician   Method of Communication Face to face   Response At bedside   Notification Time [phone]   Continue to monitor patient closely  Renetta Shukla RN

## 2020-03-02 NOTE — PROGRESS NOTES
Ventilator Bronchodilator assessment    Breath sounds: clear,diminished  Inspiratory Pressure: 20  Plateau Pressure: 14    Patient assessed at level 3        []    Bronchodilator Assessment    BRONCHODILATOR ASSESSMENT SCORE  Score 0 (Home) 1 2 3 4   Breath Sounds   []  Chronic Ventilator: Patient at baseline []  Mild Wheezes/ Clear []  Intermittent wheezes with good air entry [x]  Bilateral/unilateral wheezing with diminished air entry []  Insp/Exp wheeze and/or poor aeration   Ventilator Pressures   []  Chronic Ventilator [x]  Insp. Pressure less than 25 cm H20 [x]  Insp. Pressure less than 25 cm H20 []  Insp. Pressure exceeds 25 cm H20 []  Insp.  Pressure exceeds 30 cm H20   Plateau Pressure []  NA   []  Plateau Pressure less than 4  []  Plateau Pressure less than or equal to 5 [x]  Plateau Pressure greater than or equal to 6 []  Plateau Pressure greater than or equal to 8       MAMIE JACK  9:26 AM

## 2020-03-02 NOTE — FLOWSHEET NOTE
03/02/20 0830   Provider Notification   Reason for Communication Evaluate; Review case   Provider Name Dr. Shell Vega   Provider Notification Resident   Method of Communication Face to face   Response In department   Notification Time [id number]     Continue to monitor patient closely  Estefany Jolley RN

## 2020-03-02 NOTE — PLAN OF CARE
Problem: Restraint Use - Nonviolent/Non-Self-Destructive Behavior:  Goal: Absence of restraint-related injury  Description  Absence of restraint-related injury  Outcome: Met This Shift     Problem: Pain:  Goal: Pain level will decrease  Description  Pain level will decrease  Outcome: Ongoing  Goal: Control of acute pain  Description  Control of acute pain  Outcome: Ongoing  Goal: Control of chronic pain  Description  Control of chronic pain  Outcome: Ongoing     Problem: Discharge Planning:  Goal: Participates in care planning  Description  Participates in care planning  Outcome: Ongoing  Goal: Discharged to appropriate level of care  Description  Discharged to appropriate level of care  Outcome: Ongoing     Problem: Airway Clearance - Ineffective:  Goal: Ability to maintain a clear airway will improve  Description  Ability to maintain a clear airway will improve  Outcome: Ongoing     Problem: Anxiety/Stress:  Goal: Level of anxiety will decrease  Description  Level of anxiety will decrease  Outcome: Ongoing     Problem: Aspiration:  Goal: Absence of aspiration  Description  Absence of aspiration  Outcome: Ongoing     Problem:  Bowel Function - Altered:  Goal: Bowel elimination is within specified parameters  Description  Bowel elimination is within specified parameters  Outcome: Ongoing     Problem: Cardiac Output - Decreased:  Goal: Hemodynamic stability will improve  Description  Hemodynamic stability will improve  Outcome: Ongoing     Problem: Fluid Volume - Imbalance:  Goal: Absence of imbalanced fluid volume signs and symptoms  Description  Absence of imbalanced fluid volume signs and symptoms  Outcome: Ongoing     Problem: Gas Exchange - Impaired:  Goal: Levels of oxygenation will improve  Description  Levels of oxygenation will improve  Outcome: Ongoing     Problem: Mental Status - Impaired:  Goal: Mental status will be restored to baseline  Description  Mental status will be restored to baseline  Outcome: JOSE RAMON Esparza  Outcome: Ongoing  Goal: Oral health is maintained or improved  3/2/2020 0624 by Carmen David RN  Outcome: Ongoing  3/1/2020 2040 by JOSE RAMON Sen  Outcome: Ongoing  Goal: ET tube will be managed safely  3/2/2020 0624 by Carmen David RN  Outcome: Ongoing  3/1/2020 2040 by JOSE RAMON Sen  Outcome: Ongoing  Goal: Ability to express needs and understand communication  3/2/2020 0624 by Carmen David RN  Outcome: Ongoing  3/1/2020 2040 by JOSE RAMON Sen  Outcome: Ongoing  Goal: Mobility/activity is maintained at optimum level for patient  3/2/2020 4662 by Carmen David RN  Outcome: Ongoing  3/1/2020 2040 by JOSE RAMON Sen  Outcome: Ongoing     Problem: SKIN INTEGRITY  Goal: Skin integrity is maintained or improved  3/2/2020 0624 by Carmen David RN  Outcome: Ongoing  3/1/2020 2040 by JOSE RAMON Sen  Outcome: Ongoing     Problem: Restraint Use - Nonviolent/Non-Self-Destructive Behavior:  Goal: Absence of restraint indications  Description  Absence of restraint indications  Outcome: Not Met This Shift

## 2020-03-02 NOTE — PROGRESS NOTES
Anesthesia at bedside to intubate patient. Dr Dimas Lopez at bedside.    2017 0.1 mg of atropine given   2019 100 mg lidocaine given  2020 100 mg propofol given by Dr Dimas Lopez  2020 100 mg succ given    First intubation attempt unsuccessful 2021  Bagging patient 2022  Second intubation attempt successful 2023    Dr Dimas Lopez placed OG at 22 , requested not to be advanced

## 2020-03-03 ENCOUNTER — APPOINTMENT (OUTPATIENT)
Dept: GENERAL RADIOLOGY | Age: 75
DRG: 004 | End: 2020-03-03
Attending: INTERNAL MEDICINE
Payer: MEDICARE

## 2020-03-03 LAB
ALLEN TEST: POSITIVE
ANION GAP SERPL CALCULATED.3IONS-SCNC: 12 MMOL/L (ref 9–17)
BUN BLDV-MCNC: 12 MG/DL (ref 8–23)
BUN/CREAT BLD: ABNORMAL (ref 9–20)
CALCIUM SERPL-MCNC: 8.6 MG/DL (ref 8.6–10.4)
CHLORIDE BLD-SCNC: 104 MMOL/L (ref 98–107)
CO2: 25 MMOL/L (ref 20–31)
CREAT SERPL-MCNC: 0.63 MG/DL (ref 0.5–0.9)
EKG ATRIAL RATE: 88 BPM
EKG P AXIS: 47 DEGREES
EKG P-R INTERVAL: 152 MS
EKG Q-T INTERVAL: 404 MS
EKG QRS DURATION: 82 MS
EKG QTC CALCULATION (BAZETT): 488 MS
EKG R AXIS: 64 DEGREES
EKG T AXIS: 11 DEGREES
EKG VENTRICULAR RATE: 88 BPM
FIO2: 70
GFR AFRICAN AMERICAN: >60 ML/MIN
GFR NON-AFRICAN AMERICAN: >60 ML/MIN
GFR SERPL CREATININE-BSD FRML MDRD: ABNORMAL ML/MIN/{1.73_M2}
GFR SERPL CREATININE-BSD FRML MDRD: ABNORMAL ML/MIN/{1.73_M2}
GLUCOSE BLD-MCNC: 107 MG/DL (ref 70–99)
HCT VFR BLD CALC: 29.5 % (ref 36.3–47.1)
HEMOGLOBIN: 9.2 G/DL (ref 11.9–15.1)
MCH RBC QN AUTO: 31.2 PG (ref 25.2–33.5)
MCHC RBC AUTO-ENTMCNC: 31.2 G/DL (ref 28.4–34.8)
MCV RBC AUTO: 100 FL (ref 82.6–102.9)
MODE: ABNORMAL
NEGATIVE BASE EXCESS, ART: ABNORMAL (ref 0–2)
NRBC AUTOMATED: 0 PER 100 WBC
O2 DEVICE/FLOW/%: ABNORMAL
PATIENT TEMP: ABNORMAL
PDW BLD-RTO: 14.6 % (ref 11.8–14.4)
PLATELET # BLD: 203 K/UL (ref 138–453)
PMV BLD AUTO: 11.8 FL (ref 8.1–13.5)
POC HCO3: 29.9 MMOL/L (ref 21–28)
POC O2 SATURATION: 95 % (ref 94–98)
POC PCO2 TEMP: ABNORMAL MM HG
POC PCO2: 47 MM HG (ref 35–48)
POC PH TEMP: ABNORMAL
POC PH: 7.41 (ref 7.35–7.45)
POC PO2 TEMP: ABNORMAL MM HG
POC PO2: 75.8 MM HG (ref 83–108)
POSITIVE BASE EXCESS, ART: 4 (ref 0–3)
POTASSIUM SERPL-SCNC: 3.7 MMOL/L (ref 3.7–5.3)
RBC # BLD: 2.95 M/UL (ref 3.95–5.11)
SAMPLE SITE: ABNORMAL
SODIUM BLD-SCNC: 141 MMOL/L (ref 135–144)
TCO2 (CALC), ART: 31 MMOL/L (ref 22–29)
WBC # BLD: 11 K/UL (ref 3.5–11.3)

## 2020-03-03 PROCEDURE — 6360000002 HC RX W HCPCS: Performed by: STUDENT IN AN ORGANIZED HEALTH CARE EDUCATION/TRAINING PROGRAM

## 2020-03-03 PROCEDURE — 99291 CRITICAL CARE FIRST HOUR: CPT | Performed by: INTERNAL MEDICINE

## 2020-03-03 PROCEDURE — 97161 PT EVAL LOW COMPLEX 20 MIN: CPT

## 2020-03-03 PROCEDURE — C9113 INJ PANTOPRAZOLE SODIUM, VIA: HCPCS | Performed by: STUDENT IN AN ORGANIZED HEALTH CARE EDUCATION/TRAINING PROGRAM

## 2020-03-03 PROCEDURE — 2580000003 HC RX 258: Performed by: STUDENT IN AN ORGANIZED HEALTH CARE EDUCATION/TRAINING PROGRAM

## 2020-03-03 PROCEDURE — 36600 WITHDRAWAL OF ARTERIAL BLOOD: CPT

## 2020-03-03 PROCEDURE — 6370000000 HC RX 637 (ALT 250 FOR IP): Performed by: STUDENT IN AN ORGANIZED HEALTH CARE EDUCATION/TRAINING PROGRAM

## 2020-03-03 PROCEDURE — 2000000000 HC ICU R&B

## 2020-03-03 PROCEDURE — 94761 N-INVAS EAR/PLS OXIMETRY MLT: CPT

## 2020-03-03 PROCEDURE — 94640 AIRWAY INHALATION TREATMENT: CPT

## 2020-03-03 PROCEDURE — 94003 VENT MGMT INPAT SUBQ DAY: CPT

## 2020-03-03 PROCEDURE — 82803 BLOOD GASES ANY COMBINATION: CPT

## 2020-03-03 PROCEDURE — 2500000003 HC RX 250 WO HCPCS: Performed by: STUDENT IN AN ORGANIZED HEALTH CARE EDUCATION/TRAINING PROGRAM

## 2020-03-03 PROCEDURE — 85027 COMPLETE CBC AUTOMATED: CPT

## 2020-03-03 PROCEDURE — 94770 HC ETCO2 MONITOR DAILY: CPT

## 2020-03-03 PROCEDURE — 71045 X-RAY EXAM CHEST 1 VIEW: CPT

## 2020-03-03 PROCEDURE — 36415 COLL VENOUS BLD VENIPUNCTURE: CPT

## 2020-03-03 PROCEDURE — 80048 BASIC METABOLIC PNL TOTAL CA: CPT

## 2020-03-03 PROCEDURE — 97110 THERAPEUTIC EXERCISES: CPT

## 2020-03-03 PROCEDURE — 2700000000 HC OXYGEN THERAPY PER DAY

## 2020-03-03 RX ORDER — FUROSEMIDE 10 MG/ML
40 INJECTION INTRAMUSCULAR; INTRAVENOUS ONCE
Status: COMPLETED | OUTPATIENT
Start: 2020-03-03 | End: 2020-03-03

## 2020-03-03 RX ORDER — SODIUM CHLORIDE, SODIUM LACTATE, POTASSIUM CHLORIDE, AND CALCIUM CHLORIDE .6; .31; .03; .02 G/100ML; G/100ML; G/100ML; G/100ML
250 INJECTION, SOLUTION INTRAVENOUS ONCE
Status: COMPLETED | OUTPATIENT
Start: 2020-03-03 | End: 2020-03-03

## 2020-03-03 RX ORDER — METHYLPREDNISOLONE SODIUM SUCCINATE 40 MG/ML
40 INJECTION, POWDER, LYOPHILIZED, FOR SOLUTION INTRAMUSCULAR; INTRAVENOUS EVERY 8 HOURS
Status: DISCONTINUED | OUTPATIENT
Start: 2020-03-03 | End: 2020-03-08

## 2020-03-03 RX ADMIN — IPRATROPIUM BROMIDE AND ALBUTEROL SULFATE 1 AMPULE: .5; 3 SOLUTION RESPIRATORY (INHALATION) at 11:25

## 2020-03-03 RX ADMIN — IPRATROPIUM BROMIDE AND ALBUTEROL SULFATE 1 AMPULE: .5; 3 SOLUTION RESPIRATORY (INHALATION) at 19:46

## 2020-03-03 RX ADMIN — AZTREONAM 1 G: 1 INJECTION, POWDER, LYOPHILIZED, FOR SOLUTION INTRAMUSCULAR; INTRAVENOUS at 21:14

## 2020-03-03 RX ADMIN — AZTREONAM 1 G: 1 INJECTION, POWDER, LYOPHILIZED, FOR SOLUTION INTRAMUSCULAR; INTRAVENOUS at 05:32

## 2020-03-03 RX ADMIN — IPRATROPIUM BROMIDE AND ALBUTEROL SULFATE 1 AMPULE: .5; 3 SOLUTION RESPIRATORY (INHALATION) at 15:44

## 2020-03-03 RX ADMIN — Medication 10 ML: at 09:26

## 2020-03-03 RX ADMIN — PANTOPRAZOLE SODIUM 40 MG: 40 INJECTION, POWDER, FOR SOLUTION INTRAVENOUS at 09:26

## 2020-03-03 RX ADMIN — SODIUM CHLORIDE, PRESERVATIVE FREE 10 ML: 5 INJECTION INTRAVENOUS at 20:51

## 2020-03-03 RX ADMIN — SODIUM CHLORIDE, POTASSIUM CHLORIDE, SODIUM LACTATE AND CALCIUM CHLORIDE: 600; 310; 30; 20 INJECTION, SOLUTION INTRAVENOUS at 14:29

## 2020-03-03 RX ADMIN — METHYLPREDNISOLONE SODIUM SUCCINATE 40 MG: 40 INJECTION, POWDER, FOR SOLUTION INTRAMUSCULAR; INTRAVENOUS at 19:43

## 2020-03-03 RX ADMIN — ALBUTEROL SULFATE 2.5 MG: 2.5 SOLUTION RESPIRATORY (INHALATION) at 23:26

## 2020-03-03 RX ADMIN — ALBUTEROL SULFATE 2.5 MG: 2.5 SOLUTION RESPIRATORY (INHALATION) at 03:10

## 2020-03-03 RX ADMIN — Medication 100 MCG/HR: at 05:10

## 2020-03-03 RX ADMIN — IPRATROPIUM BROMIDE AND ALBUTEROL SULFATE 1 AMPULE: .5; 3 SOLUTION RESPIRATORY (INHALATION) at 07:55

## 2020-03-03 RX ADMIN — FUROSEMIDE 40 MG: 10 INJECTION, SOLUTION INTRAMUSCULAR; INTRAVENOUS at 12:30

## 2020-03-03 RX ADMIN — CLINDAMYCIN IN 5 PERCENT DEXTROSE 600 MG: 12 INJECTION, SOLUTION INTRAVENOUS at 21:14

## 2020-03-03 RX ADMIN — METHYLPREDNISOLONE SODIUM SUCCINATE 40 MG: 40 INJECTION, POWDER, FOR SOLUTION INTRAMUSCULAR; INTRAVENOUS at 11:10

## 2020-03-03 RX ADMIN — AZTREONAM 1 G: 1 INJECTION, POWDER, LYOPHILIZED, FOR SOLUTION INTRAMUSCULAR; INTRAVENOUS at 13:51

## 2020-03-03 RX ADMIN — CLINDAMYCIN IN 5 PERCENT DEXTROSE 600 MG: 12 INJECTION, SOLUTION INTRAVENOUS at 13:51

## 2020-03-03 RX ADMIN — CLINDAMYCIN IN 5 PERCENT DEXTROSE 600 MG: 12 INJECTION, SOLUTION INTRAVENOUS at 05:32

## 2020-03-03 RX ADMIN — ENOXAPARIN SODIUM 40 MG: 40 INJECTION SUBCUTANEOUS at 09:25

## 2020-03-03 RX ADMIN — PANTOPRAZOLE SODIUM 40 MG: 40 INJECTION, POWDER, FOR SOLUTION INTRAVENOUS at 20:46

## 2020-03-03 RX ADMIN — Medication 100 MCG/HR: at 15:11

## 2020-03-03 RX ADMIN — Medication 4 MG/HR: at 19:41

## 2020-03-03 RX ADMIN — SODIUM CHLORIDE, POTASSIUM CHLORIDE, SODIUM LACTATE AND CALCIUM CHLORIDE 250 ML: 600; 310; 30; 20 INJECTION, SOLUTION INTRAVENOUS at 01:23

## 2020-03-03 RX ADMIN — SODIUM CHLORIDE, POTASSIUM CHLORIDE, SODIUM LACTATE AND CALCIUM CHLORIDE: 600; 310; 30; 20 INJECTION, SOLUTION INTRAVENOUS at 14:30

## 2020-03-03 ASSESSMENT — PAIN - FUNCTIONAL ASSESSMENT: PAIN_FUNCTIONAL_ASSESSMENT: ACTIVITIES ARE NOT PREVENTED

## 2020-03-03 ASSESSMENT — PULMONARY FUNCTION TESTS
PIF_VALUE: 24
PIF_VALUE: 26
PIF_VALUE: 30
PIF_VALUE: 23
PIF_VALUE: 25
PIF_VALUE: 25

## 2020-03-03 ASSESSMENT — PAIN DESCRIPTION - PAIN TYPE: TYPE: ACUTE PAIN

## 2020-03-03 ASSESSMENT — PAIN SCALES - GENERAL: PAINLEVEL_OUTOF10: 0

## 2020-03-03 ASSESSMENT — PAIN DESCRIPTION - LOCATION: LOCATION: ABDOMEN

## 2020-03-03 ASSESSMENT — PAIN SCALES - WONG BAKER: WONGBAKER_NUMERICALRESPONSE: 2

## 2020-03-03 NOTE — PLAN OF CARE
falls  Description  Will remain free from falls  Outcome: Ongoing  Goal: Absence of physical injury  Description  Absence of physical injury  Outcome: Ongoing     Problem: OXYGENATION/RESPIRATORY FUNCTION  Goal: Patient will maintain patent airway  Outcome: Ongoing  Goal: Patient will achieve/maintain normal respiratory rate/effort  Description  Respiratory rate and effort will be within normal limits for the patient  Outcome: Ongoing     Problem: MECHANICAL VENTILATION  Goal: Patient will maintain patent airway  Outcome: Ongoing  Goal: Oral health is maintained or improved  Outcome: Ongoing  Goal: ET tube will be managed safely  Outcome: Ongoing  Goal: Ability to express needs and understand communication  Outcome: Ongoing  Goal: Mobility/activity is maintained at optimum level for patient  Outcome: Ongoing     Problem: SKIN INTEGRITY  Goal: Skin integrity is maintained or improved  Outcome: Ongoing     Problem: Restraint Use - Nonviolent/Non-Self-Destructive Behavior:  Goal: Absence of restraint indications  Description  Absence of restraint indications  3/3/2020 0218 by Shanae Jimenez, RN  Outcome: Not Met This Shift  3/2/2020 1746 by Valentin Muir RN  Outcome: Not Met This Shift

## 2020-03-03 NOTE — PROGRESS NOTES
Physical Therapy    Facility/Department: [address] [facility]U  Initial Assessment    NAME: Alee Quiñones  : 1945  MRN: [id number]    Date of Service: 3/3/2020  Pt presented with respiratory distress- was recently discharged from the hospital.     Discharge Recommendations:  Continue to assess pending progress   PT Equipment Recommendations  Equipment Needed: (TBD)    Assessment   Body structures, Functions, Activity limitations: Decreased strength;Decreased functional mobility   Assessment: Pt's evaluation limited to AAROM only this date as pt is inbutated and with sedation per notes. Will have to continue to assess discharge needs once able to attempt mobility. Prognosis: Good  Decision Making: Low Complexity  PT Education: PT Role;Plan of Care;Family Education;Home Exercise Program  Patient Education: Educated on role of supine exercises while intubated  REQUIRES PT FOLLOW UP: Yes  Activity Tolerance  Activity Tolerance: Treatment limited secondary to medical complications (free text)  Activity Tolerance: Inbutated/sedated       Patient Diagnosis(es): There were no encounter diagnoses. has a past medical history of Anxiety, Arthritis, Back pain, Bronchitis, Caffeine use, Carpal tunnel syndrome, Cataracts, bilateral, Constipation, Depression, Diarrhea, GERD (gastroesophageal reflux disease), Hyperlipidemia, Hypertension, Mumps, MVP (mitral valve prolapse), Recurrent UTI, Sinusitis, Ulcerative esophagitis, and Wellness examination. has a past surgical history that includes Hysterectomy; total nephrectomy; Tonsillectomy; Appendectomy; Cystoscopy; Ovary removal; Tubal ligation; rhinoplasty; Carpal tunnel release; Hand surgery; Total hip arthroplasty; eye surgery; Colonoscopy; joint replacement (Right); hiatal hernia repair (2020); and Gastric fundoplication (N/A, ).     Restrictions  Restrictions/Precautions  Restrictions/Precautions: General Precautions  Required Braces or Orthoses?: No  Position Activity Restriction  Other position/activity restrictions: up with assistance, intubated/sedated  Vision/Hearing  Vision: Within Functional Limits  Hearing: Within functional limits     Subjective  General  Patient assessed for rehabilitation services?: Yes  Response To Previous Treatment: Not applicable  Family / Caregiver Present: Yes(son at end of session)  Follows Commands: Within Functional Limits(Follows simple commands)  Subjective  Subjective: Pt supine in bed and agreeable to therapy this afternoon. Pt able to answer most yes/no questions for writer but limited communication secondary to pt being intubated. Pt does point to abdomen when asked about pain.    Pain Screening  Patient Currently in Pain: Yes  Pain Assessment  Pain Assessment: Faces  Figueroa-Baker Pain Rating: Hurts a little bit  Pain Type: Acute pain  Pain Location: Abdomen  Functional Pain Assessment: Activities are not prevented  Non-Pharmaceutical Pain Intervention(s): Distraction;Repositioned  Response to Pain Intervention: Patient Satisfied  Vital Signs  Patient Currently in Pain: Yes       Social/Functional History  Social/Functional History  Lives With: Alone  Type of Home: House  Home Layout: One level  Home Access: Stairs to enter with rails  Entrance Stairs - Number of Steps: 3  Entrance Stairs - Rails: Left  Bathroom Shower/Tub: Walk-in shower  Bathroom Toilet: Standard  Bathroom Equipment: Grab bars in shower, Shower chair  Home Equipment: Rolling walker, 4 wheeled walker, Cane(Pt's son reports use of rollator at all times)  ADL Assistance: Independent  Homemaking Assistance: Independent  Homemaking Responsibilities: Yes  Meal Prep Responsibility: Primary  Laundry Responsibility: Primary  Cleaning Responsibility: Primary  Shopping Responsibility: Primary  Ambulation Assistance: Independent  Transfer Assistance: Independent  Active : No  Patient's  Info: son drives  Mode of Transportation: Family  Occupation: Retired  Type of

## 2020-03-03 NOTE — PROGRESS NOTES
Nutrition Assessment    Type and Reason for Visit: Initial(vent )    Nutrition Recommendations: Start nutrition as able. If TPN needed, suggest starting with 200 g Dex, 50 g AA at 41.7 ml/hr with 250 mL 20% lipids. Will continue to follow/monitor plans. Nutrition Assessment: Chart reviewed. S/p robotic hiatal hernia repair with fundoplication on 1/67/5384. Pt came in with with c/o SOB, and episode of emesis. Pt intubated 3/1/20. No nutrition at present. Malnutrition Assessment:  · Malnutrition Status: At risk for malnutrition  · Context: Acute illness or injury  · Findings of the 6 clinical characteristics of malnutrition (Minimum of 2 out of 6 clinical characteristics is required to make the diagnosis of moderate or severe Protein Calorie Malnutrition based on AND/ASPEN Guidelines):  1. Energy Intake-Less than or equal to 75% of estimated energy requirement, Greater than or equal to 7 days    2. Weight Loss-No significant weight loss, (3 weeks)  3. Fat Loss-No significant subcutaneous fat loss,    4. Muscle Loss-No significant muscle mass loss,    5. Fluid Accumulation-No significant fluid accumulation,    6.   Strength-Not measured    Nutrition Risk Level: High    Nutrient Needs:  · Estimated Daily Total Kcal: 1300 kcal/day   · Estimated Daily Protein (g):  g pro/day     Nutrition Diagnosis:   · Problem: Inadequate oral intake  · Etiology: related to Alteration in GI function, Impaired respiratory function-inability to consume food     Signs and symptoms:  as evidenced by NPO status due to medical condition    Objective Information:  · Current Nutrition Therapies:  · Oral Diet Orders: NPO   · Anthropometric Measures:  · Ht: 5' 2\" (157.5 cm)   · Current Body Wt: 197 lb 12 oz (89.7 kg)  · Usual Body Wt: 186 lb on 2/10/20 (standing scale)   · Ideal Body Wt: 110 lb (49.9 kg), % Ideal Body 179%  · BMI Classification: BMI 35.0 - 39.9 Obese Class II    Nutrition Interventions:   Start nutrition as able. If TPN needed, suggest starting with 200 g Dex, 50 g AA at 41.7 ml/hr with 250 mL 20% lipids.    Continued Inpatient Monitoring, Education Not Indicated    Nutrition Evaluation:   · Evaluation: Goals set   · Goals: meet % of estimated nutrition needs     · Monitoring: Nutrition Progression, I&O, Weight, Pertinent Labs, Monitor Bowel Function      Electronically signed by Vianney Sung RD, LD on 3/3/20 at 11:40 AM    Contact Number: [phone]

## 2020-03-03 NOTE — PROGRESS NOTES
PRN        SUPPORT DEVICES: [x] Ventilator [] BIPAP  [] Nasal Cannula [] Room Air    VENT SETTINGS (Comprehensive) (if applicable):  Vent Information  $Ventilation: $Subsequent Day  Skin Assessment: Clean, dry, & intact  Equipment Changed: HME  Vent Type: Servo i  Vent Mode: PRVC  Vt Ordered: 420 mL  Pressure Ordered: 8  Rate Set: 20 bmp  Pressure Support: 6 cmH20  FiO2 : 70 %  Sensitivity: 5  PEEP/CPAP: 10  I Time/ I Time %: 0.7 s  Humidification Source: HME  Humidification Temp: 31  Additional Respiratory  Assessments  Pulse: 76  Resp: 20  SpO2: 96 %  End Tidal CO2: 48 (%)  Position: Semi-Shelton's  Humidification Source: HME  Humidification Temp: 31  Oral Care Completed?: Yes  Oral Care: Mouthwash, Mouth swabbed, Mouth suctioned  Subglottic Suction Done?: Yes  Skin barrier applied: Yes    ABGs:   Lab Results   Component Value Date    FCP9WEZ 31 03/03/2020    FIO2 70.0 03/03/2020     Lactic Acid:   Lab Results   Component Value Date    LACTA 4.1 04/30/2018     DATA:  Complete Blood Count:   Recent Labs     03/01/20  0432 03/01/20  1022 03/03/20  0715   WBC 10.0  --  11.0   HGB 10.8* 11.3* 9.2*   .8  --  100.0     --  203   RBC 3.55*  --  2.95*   HCT 35.8* 35.4* 29.5*   MCH 30.4  --  31.2   MCHC 30.2  --  31.2   RDW 13.7  --  14.6*   MPV 11.0  --  11.8      PT/INR:    Lab Results   Component Value Date    PROTIME 11.0 02/10/2020    INR 1.0 02/10/2020     Basal Metabolic Profile:   Recent Labs     03/01/20  0432 03/02/20  0402 03/03/20  0715    140 141   K 4.1 3.4* 3.7   BUN 10 13 12   CREATININE 0.79 0.78 0.63    103 104   CO2 22 25 25      Magnesium:   Lab Results   Component Value Date    MG 2.3 03/02/2020    MG 1.8 03/01/2020    MG 1.6 02/29/2020     Phosphorus:   Lab Results   Component Value Date    PHOS 3.6 03/01/2020     S.  Calcium:  Recent Labs     03/03/20  0715   CALCIUM 8.6     Urinalysis:   Lab Results   Component Value Date    NITRU NEGATIVE 02/29/2020    COLORU YELLOW hypotension. 5. Status post laparoscopic hiatal hernia repair and Nissen fundoplication on 9/22/8179. Bariatric following. 6. Esophageal dilatation likely due to #4. Bariatric following. 7. Mild BENEDICT likely due to hypotension. Resolved. 8. Possible upper GI bleed. Hemoglobin initially down trended from 13 to 9.2. Continue IV Protonix for now. Continue to monitor Hb.          Continue DVT prophylaxis with Lovenox and EPC cuff  Continue GI prophylaxis with Protonix  Pt/OT consulted        Joseline Garcia MD.  Internal Medicine Resident   [id number] [facility]   3/3/2020, 8:18 AM

## 2020-03-03 NOTE — PLAN OF CARE
BRONCHOSPASM/BRONCHOCONSTRICTION     [x]         IMPROVE AERATION/BREATH SOUNDS  [x]   ADMINISTER BRONCHODILATOR THERAPY AS APPROPRIATE  [x]   ASSESS BREATH SOUNDS  [x]   IMPLEMENT AEROSOL/MDI PROTOCOL  [x]   PATIENT EDUCATION AS NEEDED   PROVIDE ADEQUATE OXYGENATION WITH ACCEPTABLE SP02/ABG'S    [x]  IDENTIFY APPROPRIATE OXYGEN THERAPY  [x]   MONITOR SP02/ABG'S AS NEEDED   [x]   PATIENT EDUCATION AS NEEDED   Ventilator Bronchodilator assessment    Breath sounds: diminished  Inspiratory Pressure: 25  Plateau Pressure: 21    Patient assessed at level 3          [x]    Bronchodilator Assessment    BRONCHODILATOR ASSESSMENT SCORE  Score 0 (Home) 1 2 3 4   Breath Sounds   []  Chronic Ventilator: Patient at baseline []  Mild Wheezes/ Clear []  Intermittent wheezes with good air entry [x]  Bilateral/unilateral wheezing with diminished air entry []  Insp/Exp wheeze and/or poor aeration   Ventilator Pressures   []  Chronic Ventilator []  Insp. Pressure less than 25 cm H20 []  Insp. Pressure less than 25 cm H20 [x]  Insp. Pressure exceeds 25 cm H20 []  Insp.  Pressure exceeds 30 cm H20   Plateau Pressure []  NA   [x]  Plateau Pressure less than 4  []  Plateau Pressure less than or equal to 5 []  Plateau Pressure greater than or equal to 6 []  Plateau Pressure greater than or equal to 8       Fausto & Jaquan  1:49 PM   Problem: OXYGENATION/RESPIRATORY FUNCTION  Goal: Patient will maintain patent airway  Outcome: Ongoing  Goal: Patient will achieve/maintain normal respiratory rate/effort  Respiratory rate and effort will be within normal limits for the patient  Outcome: Ongoing    Problem: MECHANICAL VENTILATION  Goal: Patient will maintain patent airway  Outcome: Ongoing  Goal: Oral health is maintained or improved  Outcome: Ongoing  Goal: ET tube will be managed safely  Outcome: Ongoing  Goal: Ability to express needs and understand communication  Outcome: Ongoing  Goal: Mobility/activity is maintained at optimum level for patient  Outcome: Ongoing    Problem: ASPIRATION PRECAUTIONS  Goal: Patients risk of aspiration is minimized  Outcome: Ongoing    Problem: SKIN INTEGRITY  Goal: Skin integrity is maintained or improved  Outcome: Ongoing

## 2020-03-03 NOTE — FLOWSHEET NOTE
Patient alert but unable to speak due to tube. Son in chair, bedside.  engaged in conversation. Patient coping and son is supportive. Son has big sense of humor.  provided empathetic listening.

## 2020-03-04 ENCOUNTER — APPOINTMENT (OUTPATIENT)
Dept: GENERAL RADIOLOGY | Age: 75
DRG: 004 | End: 2020-03-04
Attending: INTERNAL MEDICINE
Payer: MEDICARE

## 2020-03-04 LAB
ABSOLUTE EOS #: 0 K/UL (ref 0–0.4)
ABSOLUTE IMMATURE GRANULOCYTE: 0.28 K/UL (ref 0–0.3)
ABSOLUTE LYMPH #: 0.47 K/UL (ref 1–4.8)
ABSOLUTE MONO #: 0.19 K/UL (ref 0.1–0.8)
ALLEN TEST: POSITIVE
BASOPHILS # BLD: 0 % (ref 0–2)
BASOPHILS ABSOLUTE: 0 K/UL (ref 0–0.2)
DIFFERENTIAL TYPE: ABNORMAL
EOSINOPHILS RELATIVE PERCENT: 0 % (ref 1–4)
FIO2: 55
GLUCOSE BLD-MCNC: 120 MG/DL (ref 65–105)
HCT VFR BLD CALC: 30.8 % (ref 36.3–47.1)
HEMOGLOBIN: 9.6 G/DL (ref 11.9–15.1)
IMMATURE GRANULOCYTES: 3 %
LYMPHOCYTES # BLD: 5 % (ref 24–44)
MCH RBC QN AUTO: 31.4 PG (ref 25.2–33.5)
MCHC RBC AUTO-ENTMCNC: 31.2 G/DL (ref 28.4–34.8)
MCV RBC AUTO: 100.7 FL (ref 82.6–102.9)
MODE: ABNORMAL
MONOCYTES # BLD: 2 % (ref 1–7)
MORPHOLOGY: ABNORMAL
NEGATIVE BASE EXCESS, ART: ABNORMAL (ref 0–2)
NRBC AUTOMATED: 0 PER 100 WBC
O2 DEVICE/FLOW/%: ABNORMAL
PATIENT TEMP: ABNORMAL
PDW BLD-RTO: 14.6 % (ref 11.8–14.4)
PLATELET # BLD: 234 K/UL (ref 138–453)
PLATELET ESTIMATE: ABNORMAL
PMV BLD AUTO: 11.8 FL (ref 8.1–13.5)
POC HCO3: 33 MMOL/L (ref 21–28)
POC O2 SATURATION: 94 % (ref 94–98)
POC PCO2 TEMP: ABNORMAL MM HG
POC PCO2: 38.1 MM HG (ref 35–48)
POC PH TEMP: ABNORMAL
POC PH: 7.54 (ref 7.35–7.45)
POC PO2 TEMP: ABNORMAL MM HG
POC PO2: 63 MM HG (ref 83–108)
POSITIVE BASE EXCESS, ART: 10 (ref 0–3)
RBC # BLD: 3.06 M/UL (ref 3.95–5.11)
RBC # BLD: ABNORMAL 10*6/UL
SAMPLE SITE: ABNORMAL
SEG NEUTROPHILS: 90 % (ref 36–66)
SEGMENTED NEUTROPHILS ABSOLUTE COUNT: 8.46 K/UL (ref 1.8–7.7)
TCO2 (CALC), ART: 34 MMOL/L (ref 22–29)
WBC # BLD: 9.4 K/UL (ref 3.5–11.3)
WBC # BLD: ABNORMAL 10*3/UL

## 2020-03-04 PROCEDURE — 36600 WITHDRAWAL OF ARTERIAL BLOOD: CPT

## 2020-03-04 PROCEDURE — 02HV33Z INSERTION OF INFUSION DEVICE INTO SUPERIOR VENA CAVA, PERCUTANEOUS APPROACH: ICD-10-PCS | Performed by: INTERNAL MEDICINE

## 2020-03-04 PROCEDURE — 94003 VENT MGMT INPAT SUBQ DAY: CPT

## 2020-03-04 PROCEDURE — 36569 INSJ PICC 5 YR+ W/O IMAGING: CPT

## 2020-03-04 PROCEDURE — 85025 COMPLETE CBC W/AUTO DIFF WBC: CPT

## 2020-03-04 PROCEDURE — 82803 BLOOD GASES ANY COMBINATION: CPT

## 2020-03-04 PROCEDURE — 36415 COLL VENOUS BLD VENIPUNCTURE: CPT

## 2020-03-04 PROCEDURE — 6360000002 HC RX W HCPCS: Performed by: STUDENT IN AN ORGANIZED HEALTH CARE EDUCATION/TRAINING PROGRAM

## 2020-03-04 PROCEDURE — 2500000003 HC RX 250 WO HCPCS: Performed by: STUDENT IN AN ORGANIZED HEALTH CARE EDUCATION/TRAINING PROGRAM

## 2020-03-04 PROCEDURE — 6370000000 HC RX 637 (ALT 250 FOR IP): Performed by: STUDENT IN AN ORGANIZED HEALTH CARE EDUCATION/TRAINING PROGRAM

## 2020-03-04 PROCEDURE — 2700000000 HC OXYGEN THERAPY PER DAY

## 2020-03-04 PROCEDURE — C1751 CATH, INF, PER/CENT/MIDLINE: HCPCS

## 2020-03-04 PROCEDURE — 2580000003 HC RX 258: Performed by: STUDENT IN AN ORGANIZED HEALTH CARE EDUCATION/TRAINING PROGRAM

## 2020-03-04 PROCEDURE — 94640 AIRWAY INHALATION TREATMENT: CPT

## 2020-03-04 PROCEDURE — 94770 HC ETCO2 MONITOR DAILY: CPT

## 2020-03-04 PROCEDURE — 94761 N-INVAS EAR/PLS OXIMETRY MLT: CPT

## 2020-03-04 PROCEDURE — 82947 ASSAY GLUCOSE BLOOD QUANT: CPT

## 2020-03-04 PROCEDURE — 2000000000 HC ICU R&B

## 2020-03-04 PROCEDURE — 76937 US GUIDE VASCULAR ACCESS: CPT

## 2020-03-04 PROCEDURE — C9113 INJ PANTOPRAZOLE SODIUM, VIA: HCPCS | Performed by: STUDENT IN AN ORGANIZED HEALTH CARE EDUCATION/TRAINING PROGRAM

## 2020-03-04 PROCEDURE — 71045 X-RAY EXAM CHEST 1 VIEW: CPT

## 2020-03-04 PROCEDURE — 99291 CRITICAL CARE FIRST HOUR: CPT | Performed by: INTERNAL MEDICINE

## 2020-03-04 RX ORDER — SODIUM CHLORIDE 0.9 % (FLUSH) 0.9 %
10 SYRINGE (ML) INJECTION PRN
Status: DISCONTINUED | OUTPATIENT
Start: 2020-03-04 | End: 2020-04-06 | Stop reason: HOSPADM

## 2020-03-04 RX ORDER — SODIUM CHLORIDE 0.9 % (FLUSH) 0.9 %
10 SYRINGE (ML) INJECTION EVERY 12 HOURS SCHEDULED
Status: DISCONTINUED | OUTPATIENT
Start: 2020-03-04 | End: 2020-04-06 | Stop reason: HOSPADM

## 2020-03-04 RX ORDER — IPRATROPIUM BROMIDE AND ALBUTEROL SULFATE 2.5; .5 MG/3ML; MG/3ML
1 SOLUTION RESPIRATORY (INHALATION) 4 TIMES DAILY
Status: DISCONTINUED | OUTPATIENT
Start: 2020-03-04 | End: 2020-03-17

## 2020-03-04 RX ORDER — IPRATROPIUM BROMIDE AND ALBUTEROL SULFATE 2.5; .5 MG/3ML; MG/3ML
1 SOLUTION RESPIRATORY (INHALATION) EVERY 6 HOURS
Status: DISCONTINUED | OUTPATIENT
Start: 2020-03-04 | End: 2020-03-04

## 2020-03-04 RX ORDER — ALBUTEROL SULFATE 2.5 MG/3ML
2.5 SOLUTION RESPIRATORY (INHALATION) 2 TIMES DAILY
Status: DISCONTINUED | OUTPATIENT
Start: 2020-03-05 | End: 2020-03-05

## 2020-03-04 RX ORDER — ALBUTEROL SULFATE 2.5 MG/3ML
2.5 SOLUTION RESPIRATORY (INHALATION) EVERY 6 HOURS PRN
Status: DISCONTINUED | OUTPATIENT
Start: 2020-03-04 | End: 2020-03-18

## 2020-03-04 RX ORDER — BISACODYL 10 MG
10 SUPPOSITORY, RECTAL RECTAL DAILY
Status: DISCONTINUED | OUTPATIENT
Start: 2020-03-04 | End: 2020-04-06 | Stop reason: HOSPADM

## 2020-03-04 RX ORDER — LIDOCAINE HYDROCHLORIDE 10 MG/ML
5 INJECTION, SOLUTION INFILTRATION; PERINEURAL ONCE
Status: DISCONTINUED | OUTPATIENT
Start: 2020-03-04 | End: 2020-04-06 | Stop reason: HOSPADM

## 2020-03-04 RX ADMIN — SODIUM CHLORIDE, PRESERVATIVE FREE 10 ML: 5 INJECTION INTRAVENOUS at 09:20

## 2020-03-04 RX ADMIN — Medication 100 MCG/HR: at 20:16

## 2020-03-04 RX ADMIN — SODIUM CHLORIDE, POTASSIUM CHLORIDE, SODIUM LACTATE AND CALCIUM CHLORIDE: 600; 310; 30; 20 INJECTION, SOLUTION INTRAVENOUS at 12:35

## 2020-03-04 RX ADMIN — METHYLPREDNISOLONE SODIUM SUCCINATE 40 MG: 40 INJECTION, POWDER, FOR SOLUTION INTRAMUSCULAR; INTRAVENOUS at 09:22

## 2020-03-04 RX ADMIN — ASCORBIC ACID, VITAMIN A PALMITATE, CHOLECALCIFEROL, THIAMINE HYDROCHLORIDE, RIBOFLAVIN-5 PHOSPHATE SODIUM, PYRIDOXINE HYDROCHLORIDE, NIACINAMIDE, DEXPANTHENOL, ALPHA-TOCOPHEROL ACETATE, VITAMIN K1, FOLIC ACID, BIOTIN, CYANOCOBALAMIN: 200; 3300; 200; 6; 3.6; 6; 40; 15; 10; 150; 600; 60; 5 INJECTION, SOLUTION INTRAVENOUS at 18:21

## 2020-03-04 RX ADMIN — SODIUM CHLORIDE, POTASSIUM CHLORIDE, SODIUM LACTATE AND CALCIUM CHLORIDE: 600; 310; 30; 20 INJECTION, SOLUTION INTRAVENOUS at 12:37

## 2020-03-04 RX ADMIN — CLINDAMYCIN IN 5 PERCENT DEXTROSE 600 MG: 12 INJECTION, SOLUTION INTRAVENOUS at 05:08

## 2020-03-04 RX ADMIN — METHYLPREDNISOLONE SODIUM SUCCINATE 40 MG: 40 INJECTION, POWDER, FOR SOLUTION INTRAMUSCULAR; INTRAVENOUS at 02:21

## 2020-03-04 RX ADMIN — SODIUM CHLORIDE, PRESERVATIVE FREE 10 ML: 5 INJECTION INTRAVENOUS at 21:17

## 2020-03-04 RX ADMIN — CLINDAMYCIN IN 5 PERCENT DEXTROSE 600 MG: 12 INJECTION, SOLUTION INTRAVENOUS at 13:55

## 2020-03-04 RX ADMIN — ENOXAPARIN SODIUM 40 MG: 40 INJECTION SUBCUTANEOUS at 09:20

## 2020-03-04 RX ADMIN — IPRATROPIUM BROMIDE AND ALBUTEROL SULFATE 1 AMPULE: .5; 3 SOLUTION RESPIRATORY (INHALATION) at 15:52

## 2020-03-04 RX ADMIN — IPRATROPIUM BROMIDE AND ALBUTEROL SULFATE 1 AMPULE: .5; 3 SOLUTION RESPIRATORY (INHALATION) at 07:19

## 2020-03-04 RX ADMIN — SODIUM CHLORIDE, POTASSIUM CHLORIDE, SODIUM LACTATE AND CALCIUM CHLORIDE: 600; 310; 30; 20 INJECTION, SOLUTION INTRAVENOUS at 18:29

## 2020-03-04 RX ADMIN — METHYLPREDNISOLONE SODIUM SUCCINATE 40 MG: 40 INJECTION, POWDER, FOR SOLUTION INTRAMUSCULAR; INTRAVENOUS at 18:10

## 2020-03-04 RX ADMIN — AZTREONAM 1 G: 1 INJECTION, POWDER, LYOPHILIZED, FOR SOLUTION INTRAMUSCULAR; INTRAVENOUS at 05:07

## 2020-03-04 RX ADMIN — Medication 100 MCG/HR: at 00:20

## 2020-03-04 RX ADMIN — IPRATROPIUM BROMIDE AND ALBUTEROL SULFATE 1 AMPULE: .5; 3 SOLUTION RESPIRATORY (INHALATION) at 11:35

## 2020-03-04 RX ADMIN — Medication 4 MG/HR: at 12:34

## 2020-03-04 RX ADMIN — PANTOPRAZOLE SODIUM 40 MG: 40 INJECTION, POWDER, FOR SOLUTION INTRAVENOUS at 09:20

## 2020-03-04 RX ADMIN — Medication 100 MCG/HR: at 10:20

## 2020-03-04 RX ADMIN — CLINDAMYCIN IN 5 PERCENT DEXTROSE 600 MG: 12 INJECTION, SOLUTION INTRAVENOUS at 21:16

## 2020-03-04 RX ADMIN — IPRATROPIUM BROMIDE AND ALBUTEROL SULFATE 1 AMPULE: .5; 3 SOLUTION RESPIRATORY (INHALATION) at 20:23

## 2020-03-04 RX ADMIN — I.V. FAT EMULSION 100 ML: 20 EMULSION INTRAVENOUS at 18:25

## 2020-03-04 RX ADMIN — ALBUTEROL SULFATE 2.5 MG: 2.5 SOLUTION RESPIRATORY (INHALATION) at 03:01

## 2020-03-04 RX ADMIN — ONDANSETRON 4 MG: 2 INJECTION INTRAMUSCULAR; INTRAVENOUS at 12:33

## 2020-03-04 RX ADMIN — BISACODYL 10 MG: 10 SUPPOSITORY RECTAL at 10:09

## 2020-03-04 RX ADMIN — Medication 10 ML: at 09:20

## 2020-03-04 RX ADMIN — PANTOPRAZOLE SODIUM 40 MG: 40 INJECTION, POWDER, FOR SOLUTION INTRAVENOUS at 21:16

## 2020-03-04 ASSESSMENT — PULMONARY FUNCTION TESTS
PIF_VALUE: 23
PIF_VALUE: 25
PIF_VALUE: 19
PIF_VALUE: 19
PIF_VALUE: 12
PIF_VALUE: 26
PIF_VALUE: 24

## 2020-03-04 NOTE — PLAN OF CARE
Imbalance:  Goal: Absence of imbalanced fluid volume signs and symptoms  Description  Absence of imbalanced fluid volume signs and symptoms  3/3/2020 2007 by Jose L Parham RN  Outcome: Ongoing     Problem: Gas Exchange - Impaired:  Goal: Levels of oxygenation will improve  Description  Levels of oxygenation will improve  3/3/2020 2007 by Jose L Parham RN  Outcome: Ongoing     Problem: Mental Status - Impaired:  Goal: Mental status will be restored to baseline  Description  Mental status will be restored to baseline  3/3/2020 2007 by Jose L Parham RN  Outcome: Ongoing     Problem: Nutrition Deficit:  Goal: Ability to achieve adequate nutritional intake will improve  Description  Ability to achieve adequate nutritional intake will improve  3/3/2020 2007 by Jose L Parham RN  Outcome: Ongoing     Problem: Pain:  Description  Pain management should include both nonpharmacologic and pharmacologic interventions.   Goal: Recognizes and communicates pain  Description  Recognizes and communicates pain  3/4/2020 0554 by Vidhi Bhatti RN  Outcome: Ongoing  3/3/2020 2007 by Jose L Parham RN  Outcome: Ongoing  Goal: Control of acute pain  Description  Control of acute pain  3/3/2020 2007 by Jose L Parham RN  Outcome: Ongoing  Goal: Control of chronic pain  Description  Control of chronic pain  3/3/2020 2007 by Jose L Parham RN  Outcome: Ongoing     Problem: Skin Integrity - Impaired:  Goal: Will show no infection signs and symptoms  Description  Will show no infection signs and symptoms  3/3/2020 2007 by Jose L Parham RN  Outcome: Ongoing  Goal: Absence of new skin breakdown  Description  Absence of new skin breakdown  3/3/2020 2007 by Jose L Parham RN  Outcome: Ongoing     Problem: Sleep Pattern Disturbance:  Goal: Appears well-rested  Description  Appears well-rested  3/3/2020 2007 by Jose L Parham RN  Outcome: Ongoing     Problem: Risk for Impaired Skin Integrity  Goal: Tissue integrity - skin maintained or improved  3/4/2020 0503 by Irma Quinn RCP  Outcome: Ongoing  3/3/2020 2007 by Karlee Rivera RN  Outcome: Ongoing     Problem: Restraint Use - Nonviolent/Non-Self-Destructive Behavior:  Goal: Absence of restraint-related injury  Description  Absence of restraint-related injury  3/4/2020 0554 by Justice Kehr, RN  Outcome: Ongoing  3/3/2020 2007 by Karlee Rivera RN  Outcome: Ongoing     Problem: Nutrition  Goal: Optimal nutrition therapy  3/3/2020 2007 by Karlee Rivera RN  Outcome: Ongoing     Problem: Musculor/Skeletal Functional Status  Goal: Highest potential functional level  3/3/2020 2007 by Karlee Rivera RN  Outcome: Ongoing     Problem: Restraint Use - Nonviolent/Non-Self-Destructive Behavior:  Goal: Absence of restraint indications  Description  Absence of restraint indications  3/4/2020 0554 by Justice Kehr, RN  Outcome: Not Met This Shift  3/3/2020 2007 by Karlee Rivera RN  Outcome: Ongoing

## 2020-03-04 NOTE — PROGRESS NOTES
Critical Care Team - Daily Progress Note      Date and time: 3/4/2020 6:51 AM  Patient's name:  Richard Waldrop  Medical Record Number: [id number]  Patient's account/billing number: [de-identified]  Patient's YOB: 1945  Age: 76 y.o. Date of Admission: 2/29/2020 10:37 AM  Length of stay during current admission: 4  Primary Care Physician: Gabrielle Guan MD  ICU Attending Physician: Dr. Pineda Preciado Status: Full Code    Reason for ICU admission: No chief complaint on file. Subjective:     OVERNIGHT EVENTS: Overnight patient was getting frequent episodes of anxiety. She is still on fentanyl 100 continuous, Versed 6. Urine output 2100 in 24 hours. Patient is still intubated, on PRVC mode, 60/12/20/420. Hemoglobin stable at 9.6 this morning. ABGs from this morning showed metabolic alkalosis with pH 7.5, PCO2 38, HCO3 33. Surgery recommending remain n.p.o., considering TPN. Continue bowel prep regimen. She is remained on aztreonam and clindamycin for aspiration pneumonia. Chest x-ray from this morning showed Cardiomegaly and pulmonary vascular congestion.  Generalized interstitial prominence. Findings are essentially unchanged.      AWAKE & FOLLOWING COMMANDS:  [] No   [x] Yes    CURRENT VENTILATION STATUS:     [x] Ventilator  [] BIPAP  [] Nasal Cannula [] Room Air      IF INTUBATED, ET TUBE MARKING AT LOWER LIP:    24 cms    SECRETIONS Amount:  [] Small [] Moderate  [] Large  [x] None  Color:     [] White [] Colored  [] Bloody    SEDATION:  RAAS Score:  [] Propofol gtt  [x] Versed gtt  [] Ativan gtt   [] No Sedation    PARALYZED:  [x] No    [] Yes    DIARRHEA:                [x] No                [] Yes  (C. Difficile status: [] positive                                                                                                                       [] negative                                                                                                                     [] NITRU NEGATIVE 02/29/2020    COLORU YELLOW 02/29/2020    PHUR 6.0 02/29/2020    WBCUA None 02/29/2020    RBCUA 2 TO 5 02/29/2020    MUCUS NOT REPORTED 02/29/2020    TRICHOMONAS NOT REPORTED 02/29/2020    YEAST NOT REPORTED 02/29/2020    BACTERIA NOT REPORTED 02/29/2020    CLARITYU Clear 06/29/2018    SPECGRAV 1.059 02/29/2020    LEUKOCYTESUR NEGATIVE 02/29/2020    UROBILINOGEN Normal 02/29/2020    BILIRUBINUR NEGATIVE 02/29/2020    BILIRUBINUR Negative 06/29/2018    BLOODU Non-hemolyzed trace 06/29/2018    GLUCOSEU NEGATIVE 02/29/2020    KETUA NEGATIVE 02/29/2020    AMORPHOUS NOT REPORTED 02/29/2020     Last 3 Blood Glucose:   Recent Labs     03/02/20  0402 03/03/20  0715   GLUCOSE 138* 107*      Cultures during this admission:     Blood cultures:                 [x] None drawn      [] Negative             []  Positive (Details:  )  Urine Culture:                   [x] None drawn      [] Negative             []  Positive (Details:  )  Sputum Culture:               [x] None drawn       [] Negative             []  Positive (Details:  )   Endotracheal aspirate:     [x] None drawn       [] Negative             []  Positive (Details:  )      ASSESSMENT:     Active Problems:    Aspiration pneumonia (HCC)    Centrilobular emphysema (HCC)    Atelectasis    Pleural effusion    Esophageal dilatation  Resolved Problems:    * No resolved hospital problems. *    PLAN:   1. Status post intubated on 3/1/2020 due to high O2 requirment and to prevent resp failure. Currently on continuous fentanyl and Versed. 2. Bilateral lower lobe consolidation with possible aspiration as well. Continue IV aztreonam and IV clindamycin.  Continue duonebs and RT aerosol protocol. Start Iv solumedrol 40 Q8h  3. Centrilobular emphysema. Continue as needed DuoNeb and RT aerosol protocol. Start Iv solumedrol 40 Q8h. 4. Hypertension. Hold antihypertensive due to multiple episodes of hypotension.    5. Status post laparoscopic hiatal hernia repair and

## 2020-03-04 NOTE — PLAN OF CARE
Nonviolent/Non-Self-Destructive Behavior:  Goal: Absence of restraint indications  Description  Absence of restraint indications  Outcome: Ongoing  Goal: Absence of restraint-related injury  Description  Absence of restraint-related injury  Outcome: Ongoing     Problem: Nutrition  Goal: Optimal nutrition therapy  3/3/2020 2007 by Robson Aguilar RN  Outcome: Ongoing  3/3/2020 1141 by Carole Moore RD, LD  Outcome: Ongoing  Note:   Nutrition Problem: Inadequate oral intake  Intervention: Food and/or Nutrient Delivery: (Start nutrition as able.  If TPN needed, suggest starting with 200 g Dex, 50 g AA at 41.7 ml/hr with 250 mL 20% lipids. )  Nutritional Goals: meet % of estimated nutrition needs       Problem: Musculor/Skeletal Functional Status  Goal: Highest potential functional level  Outcome: Ongoing

## 2020-03-04 NOTE — PROGRESS NOTES
Nutrition Assessment    Type and Reason for Visit: Reassess, Consult(PN ordering/management )    Nutrition Recommendations: Start Parenteral Nutrition-start with Premix Central Solution (5/20) at 41.7 mL/hr with 100 mL 20% lipids (=1080 kcal and 50 g pro/day). Monitor labs and modify PN as needed/able. Nutrition Assessment: Pt remains on vent. Nutrition plan discussed with critical care team. Plan to start TPN tonight--consulted for ordering/management. Order for PICC line noted. Malnutrition Assessment:  · Malnutrition Status: At risk for malnutrition  · Context: Acute illness or injury  · Findings of the 6 clinical characteristics of malnutrition (Minimum of 2 out of 6 clinical characteristics is required to make the diagnosis of moderate or severe Protein Calorie Malnutrition based on AND/ASPEN Guidelines):  1. Energy Intake-Less than or equal to 75% of estimated energy requirement, Greater than or equal to 7 days    2. Weight Loss-No significant weight loss, 3 weeks  3. Fat Loss-No significant subcutaneous fat loss,    4. Muscle Loss-No significant muscle mass loss,    5. Fluid Accumulation-No significant fluid accumulation,    6.   Strength-Not measured    Nutrition Risk Level: High    Nutrient Needs:  · Estimated Daily Total Kcal: 3138-9860 kcal/day   · Estimated Daily Protein (g):  g pro/day     Nutrition Diagnosis:   · Problem: Inadequate oral intake  · Etiology: related to Alteration in GI function, Impaired respiratory function-inability to consume food     Signs and symptoms:  as evidenced by NPO status due to medical condition, need for Parenteral Nutrition Support    Objective Information:  · Current Nutrition Therapies:  · Oral Diet Orders: NPO   · Anthropometric Measures:  · Ht: 5' 2\" (157.5 cm)   · Current Body Wt: 197 lb 12 oz (89.7 kg)  · Admission Body Wt: 197 lb 12 oz (89.7 kg)  · Usual Body Wt: 186 lb on 2/10/20  · Ideal Body Wt: 110 lb (49.9 kg), % Ideal Body 179%  · BMI

## 2020-03-04 NOTE — PROCEDURES
History/labs/allergies reviewed  Placed by Yulissa PIZARRO   Assisted by none  Consent signed and obtained by physician  Time out performed using two identifiers  Catheter type triple  lumen picc  Product type solo2 w 3cg  Lot # [id number]  Expiration date 12/31/20  Catheter size 5  Khmer  Trimmed at 39  Total length 40  External catheter length 0 cm  Location [facility]  Number of attempts 1  Estimated blood loss 2 ml  Pre procedure cardiac Rhythm NS per bedside telemetry and/or 3CG Tracing. Placement verified by- CXR and/or 3cg Max P wave noted by amplitude changes of the P wave, positive blood return, flushes easily  Special equipment used- ultrasound, micro-introducer technique and 3cg technology if indicated  Catheter secured with statlock  Dressing applied- Tegaderm CHG  Lidocaine administered intradermally conc. 1% 2 mL  PICC line education:   [ x ] Discussed with patient/Family or POA prior to signing Informed Procedural Consent. Risks and Benefits along with reason for procedure were discussed and teaching was reinforced with an education handout on PICC insertion. [facility] FAQ Catheter Associated Blood Stream Infections and [address] REV. 7/13 Nursing and Bard Booklet left at bedside or in chart. Patient (Family or POA) acknowledged understanding of information taught and agreed to procedure. [  ] Was not discussed with patient/family or POA due to pts medical status at time of procedure. pts family or POA not available to discuss PICC education.  [facility] FAQ Catheter Associated Blood Stream Infections and [address] REV. 7/13 Nursing and Bard Booklet left at bedside or in chart

## 2020-03-05 ENCOUNTER — APPOINTMENT (OUTPATIENT)
Dept: GENERAL RADIOLOGY | Age: 75
DRG: 004 | End: 2020-03-05
Attending: INTERNAL MEDICINE
Payer: MEDICARE

## 2020-03-05 LAB
ALBUMIN SERPL-MCNC: 2 G/DL (ref 3.5–5.2)
ALBUMIN/GLOBULIN RATIO: 0.5 (ref 1–2.5)
ALLEN TEST: POSITIVE
ALP BLD-CCNC: 79 U/L (ref 35–104)
ALT SERPL-CCNC: 61 U/L (ref 5–33)
ANION GAP SERPL CALCULATED.3IONS-SCNC: 12 MMOL/L (ref 9–17)
AST SERPL-CCNC: 22 U/L
BILIRUB SERPL-MCNC: 0.25 MG/DL (ref 0.3–1.2)
BUN BLDV-MCNC: 16 MG/DL (ref 8–23)
BUN/CREAT BLD: ABNORMAL (ref 9–20)
CALCIUM SERPL-MCNC: 8.5 MG/DL (ref 8.6–10.4)
CHLORIDE BLD-SCNC: 102 MMOL/L (ref 98–107)
CO2: 24 MMOL/L (ref 20–31)
CREAT SERPL-MCNC: 0.48 MG/DL (ref 0.5–0.9)
FIO2: 50
GFR AFRICAN AMERICAN: >60 ML/MIN
GFR NON-AFRICAN AMERICAN: >60 ML/MIN
GFR SERPL CREATININE-BSD FRML MDRD: ABNORMAL ML/MIN/{1.73_M2}
GFR SERPL CREATININE-BSD FRML MDRD: ABNORMAL ML/MIN/{1.73_M2}
GLUCOSE BLD-MCNC: 133 MG/DL (ref 65–105)
GLUCOSE BLD-MCNC: 133 MG/DL (ref 65–105)
GLUCOSE BLD-MCNC: 137 MG/DL (ref 70–99)
GLUCOSE BLD-MCNC: 139 MG/DL (ref 74–100)
MAGNESIUM: 2.3 MG/DL (ref 1.6–2.6)
MODE: ABNORMAL
NEGATIVE BASE EXCESS, ART: ABNORMAL (ref 0–2)
O2 DEVICE/FLOW/%: ABNORMAL
PATIENT TEMP: ABNORMAL
PHOSPHORUS: 2 MG/DL (ref 2.6–4.5)
POC HCO3: 30.2 MMOL/L (ref 21–28)
POC O2 SATURATION: 90 % (ref 94–98)
POC PCO2 TEMP: ABNORMAL MM HG
POC PCO2: 44.5 MM HG (ref 35–48)
POC PH TEMP: ABNORMAL
POC PH: 7.44 (ref 7.35–7.45)
POC PO2 TEMP: ABNORMAL MM HG
POC PO2: 56 MM HG (ref 83–108)
POSITIVE BASE EXCESS, ART: 5 (ref 0–3)
POTASSIUM SERPL-SCNC: 4.4 MMOL/L (ref 3.7–5.3)
SAMPLE SITE: ABNORMAL
SODIUM BLD-SCNC: 138 MMOL/L (ref 135–144)
TCO2 (CALC), ART: 32 MMOL/L (ref 22–29)
TOTAL PROTEIN: 5.7 G/DL (ref 6.4–8.3)
TRIGL SERPL-MCNC: 215 MG/DL

## 2020-03-05 PROCEDURE — 97110 THERAPEUTIC EXERCISES: CPT

## 2020-03-05 PROCEDURE — 2500000003 HC RX 250 WO HCPCS: Performed by: STUDENT IN AN ORGANIZED HEALTH CARE EDUCATION/TRAINING PROGRAM

## 2020-03-05 PROCEDURE — 82803 BLOOD GASES ANY COMBINATION: CPT

## 2020-03-05 PROCEDURE — 6360000002 HC RX W HCPCS: Performed by: STUDENT IN AN ORGANIZED HEALTH CARE EDUCATION/TRAINING PROGRAM

## 2020-03-05 PROCEDURE — 36600 WITHDRAWAL OF ARTERIAL BLOOD: CPT

## 2020-03-05 PROCEDURE — 83735 ASSAY OF MAGNESIUM: CPT

## 2020-03-05 PROCEDURE — 2580000003 HC RX 258: Performed by: STUDENT IN AN ORGANIZED HEALTH CARE EDUCATION/TRAINING PROGRAM

## 2020-03-05 PROCEDURE — 6370000000 HC RX 637 (ALT 250 FOR IP): Performed by: STUDENT IN AN ORGANIZED HEALTH CARE EDUCATION/TRAINING PROGRAM

## 2020-03-05 PROCEDURE — 36415 COLL VENOUS BLD VENIPUNCTURE: CPT

## 2020-03-05 PROCEDURE — 84100 ASSAY OF PHOSPHORUS: CPT

## 2020-03-05 PROCEDURE — 94640 AIRWAY INHALATION TREATMENT: CPT

## 2020-03-05 PROCEDURE — C9113 INJ PANTOPRAZOLE SODIUM, VIA: HCPCS | Performed by: STUDENT IN AN ORGANIZED HEALTH CARE EDUCATION/TRAINING PROGRAM

## 2020-03-05 PROCEDURE — 94761 N-INVAS EAR/PLS OXIMETRY MLT: CPT

## 2020-03-05 PROCEDURE — 94003 VENT MGMT INPAT SUBQ DAY: CPT

## 2020-03-05 PROCEDURE — 99291 CRITICAL CARE FIRST HOUR: CPT | Performed by: INTERNAL MEDICINE

## 2020-03-05 PROCEDURE — 84478 ASSAY OF TRIGLYCERIDES: CPT

## 2020-03-05 PROCEDURE — 80053 COMPREHEN METABOLIC PANEL: CPT

## 2020-03-05 PROCEDURE — 82947 ASSAY GLUCOSE BLOOD QUANT: CPT

## 2020-03-05 PROCEDURE — 2700000000 HC OXYGEN THERAPY PER DAY

## 2020-03-05 PROCEDURE — 71045 X-RAY EXAM CHEST 1 VIEW: CPT

## 2020-03-05 PROCEDURE — 94770 HC ETCO2 MONITOR DAILY: CPT

## 2020-03-05 PROCEDURE — 99024 POSTOP FOLLOW-UP VISIT: CPT | Performed by: SURGERY

## 2020-03-05 PROCEDURE — 2000000000 HC ICU R&B

## 2020-03-05 RX ADMIN — METHYLPREDNISOLONE SODIUM SUCCINATE 40 MG: 40 INJECTION, POWDER, FOR SOLUTION INTRAMUSCULAR; INTRAVENOUS at 10:01

## 2020-03-05 RX ADMIN — IPRATROPIUM BROMIDE AND ALBUTEROL SULFATE 1 AMPULE: .5; 3 SOLUTION RESPIRATORY (INHALATION) at 07:34

## 2020-03-05 RX ADMIN — SODIUM CHLORIDE, PRESERVATIVE FREE 10 ML: 5 INJECTION INTRAVENOUS at 07:41

## 2020-03-05 RX ADMIN — IPRATROPIUM BROMIDE AND ALBUTEROL SULFATE 1 AMPULE: .5; 3 SOLUTION RESPIRATORY (INHALATION) at 11:07

## 2020-03-05 RX ADMIN — PANTOPRAZOLE SODIUM 40 MG: 40 INJECTION, POWDER, FOR SOLUTION INTRAVENOUS at 07:40

## 2020-03-05 RX ADMIN — PANTOPRAZOLE SODIUM 40 MG: 40 INJECTION, POWDER, FOR SOLUTION INTRAVENOUS at 21:16

## 2020-03-05 RX ADMIN — Medication 7 MG/HR: at 01:41

## 2020-03-05 RX ADMIN — IPRATROPIUM BROMIDE AND ALBUTEROL SULFATE 1 AMPULE: .5; 3 SOLUTION RESPIRATORY (INHALATION) at 19:23

## 2020-03-05 RX ADMIN — SODIUM CHLORIDE, POTASSIUM CHLORIDE, SODIUM LACTATE AND CALCIUM CHLORIDE: 600; 310; 30; 20 INJECTION, SOLUTION INTRAVENOUS at 05:00

## 2020-03-05 RX ADMIN — SODIUM CHLORIDE, PRESERVATIVE FREE 10 ML: 5 INJECTION INTRAVENOUS at 21:23

## 2020-03-05 RX ADMIN — METHYLPREDNISOLONE SODIUM SUCCINATE 40 MG: 40 INJECTION, POWDER, FOR SOLUTION INTRAMUSCULAR; INTRAVENOUS at 18:55

## 2020-03-05 RX ADMIN — Medication 10 ML: at 07:41

## 2020-03-05 RX ADMIN — SODIUM CHLORIDE, PRESERVATIVE FREE 10 ML: 5 INJECTION INTRAVENOUS at 21:16

## 2020-03-05 RX ADMIN — CLINDAMYCIN IN 5 PERCENT DEXTROSE 600 MG: 12 INJECTION, SOLUTION INTRAVENOUS at 21:16

## 2020-03-05 RX ADMIN — ENOXAPARIN SODIUM 40 MG: 40 INJECTION SUBCUTANEOUS at 07:40

## 2020-03-05 RX ADMIN — METHYLPREDNISOLONE SODIUM SUCCINATE 40 MG: 40 INJECTION, POWDER, FOR SOLUTION INTRAMUSCULAR; INTRAVENOUS at 02:39

## 2020-03-05 RX ADMIN — I.V. FAT EMULSION 100 ML: 20 EMULSION INTRAVENOUS at 18:56

## 2020-03-05 RX ADMIN — CLINDAMYCIN IN 5 PERCENT DEXTROSE 600 MG: 12 INJECTION, SOLUTION INTRAVENOUS at 05:52

## 2020-03-05 RX ADMIN — POTASSIUM CHLORIDE: 2 INJECTION, SOLUTION, CONCENTRATE INTRAVENOUS at 18:53

## 2020-03-05 RX ADMIN — BISACODYL 10 MG: 10 SUPPOSITORY RECTAL at 07:40

## 2020-03-05 RX ADMIN — IPRATROPIUM BROMIDE AND ALBUTEROL SULFATE 1 AMPULE: .5; 3 SOLUTION RESPIRATORY (INHALATION) at 15:17

## 2020-03-05 RX ADMIN — Medication 100 MCG/HR: at 05:41

## 2020-03-05 RX ADMIN — Medication 75 MCG/HR: at 18:53

## 2020-03-05 RX ADMIN — Medication 7 MG/HR: at 12:49

## 2020-03-05 RX ADMIN — CLINDAMYCIN IN 5 PERCENT DEXTROSE 600 MG: 12 INJECTION, SOLUTION INTRAVENOUS at 13:49

## 2020-03-05 ASSESSMENT — PULMONARY FUNCTION TESTS
PIF_VALUE: 25
PIF_VALUE: 16
PIF_VALUE: 18
PIF_VALUE: 28
PIF_VALUE: 29
PIF_VALUE: 25
PIF_VALUE: 27
PIF_VALUE: 28
PIF_VALUE: 18
PIF_VALUE: 24
PIF_VALUE: 22
PIF_VALUE: 20
PIF_VALUE: 20
PIF_VALUE: 22
PIF_VALUE: 23
PIF_VALUE: 26
PIF_VALUE: 13
PIF_VALUE: 22

## 2020-03-05 ASSESSMENT — PAIN DESCRIPTION - LOCATION: LOCATION: ABDOMEN

## 2020-03-05 ASSESSMENT — PAIN DESCRIPTION - PAIN TYPE: TYPE: ACUTE PAIN

## 2020-03-05 NOTE — PLAN OF CARE
membranes  Description  Structural intactness and normal physiological function of skin and  mucous membranes.   3/4/2020 1947 by Bryan Torre RN  Outcome: Ongoing     Problem: Falls - Risk of:  Goal: Will remain free from falls  Description  Will remain free from falls  3/5/2020 0655 by West Frey RN  Outcome: Ongoing  3/4/2020 1947 by Bryan Torre RN  Outcome: Ongoing  Goal: Absence of physical injury  Description  Absence of physical injury  3/4/2020 1947 by Bryan Torre RN  Outcome: Ongoing     Problem: OXYGENATION/RESPIRATORY FUNCTION  Goal: Patient will maintain patent airway  3/4/2020 1947 by Bryan Torre RN  Outcome: Ongoing  3/4/2020 1919 by Raul Bacon RCP  Outcome: Ongoing  Goal: Patient will achieve/maintain normal respiratory rate/effort  Description  Respiratory rate and effort will be within normal limits for the patient  3/4/2020 1947 by Bryan Torre RN  Outcome: Ongoing  3/4/2020 1919 by Raul Bacon RCP  Outcome: Ongoing     Problem: MECHANICAL VENTILATION  Goal: Patient will maintain patent airway  3/4/2020 1947 by Bryan Torre RN  Outcome: Ongoing  3/4/2020 1919 by Raul Bacon RCP  Outcome: Ongoing  Goal: Oral health is maintained or improved  3/4/2020 1947 by Bryan Torre RN  Outcome: Ongoing  3/4/2020 1919 by Raul Bacon RCP  Outcome: Ongoing  Goal: ET tube will be managed safely  3/4/2020 1947 by Bryan Torre RN  Outcome: Ongoing  3/4/2020 1919 by Raul Bacon RCP  Outcome: Ongoing  Goal: Ability to express needs and understand communication  3/4/2020 1947 by Bryan Torre RN  Outcome: Ongoing  3/4/2020 1919 by Raul Bacon RCP  Outcome: Ongoing  Goal: Mobility/activity is maintained at optimum level for patient  3/4/2020 1947 by Bryan Torre RN  Outcome: Ongoing  3/4/2020 1919 by Raul Bacon RCP  Outcome: Ongoing     Problem: SKIN INTEGRITY  Goal: Skin integrity is maintained or improved  3/4/2020 1947 by Bryan Torre RN  Outcome:

## 2020-03-05 NOTE — PLAN OF CARE
be restored to baseline  Description  Mental status will be restored to baseline  Outcome: Ongoing     Problem: Nutrition Deficit:  Goal: Ability to achieve adequate nutritional intake will improve  Description  Ability to achieve adequate nutritional intake will improve  Outcome: Ongoing     Problem: Pain:  Goal: Recognizes and communicates pain  Description  Recognizes and communicates pain  3/4/2020 1947 by Luis Sheffield RN  Outcome: Ongoing  3/4/2020 0554 by Magdalena Mcclure RN  Outcome: Ongoing  Goal: Control of acute pain  Description  Control of acute pain  Outcome: Ongoing  Goal: Control of chronic pain  Description  Control of chronic pain  Outcome: Ongoing     Problem: Skin Integrity - Impaired:  Goal: Will show no infection signs and symptoms  Description  Will show no infection signs and symptoms  Outcome: Ongoing  Goal: Absence of new skin breakdown  Description  Absence of new skin breakdown  Outcome: Ongoing     Problem: Sleep Pattern Disturbance:  Goal: Appears well-rested  Description  Appears well-rested  Outcome: Ongoing     Problem: Risk for Impaired Skin Integrity  Goal: Tissue integrity - skin and mucous membranes  Description  Structural intactness and normal physiological function of skin and  mucous membranes.   Outcome: Ongoing     Problem: Falls - Risk of:  Goal: Will remain free from falls  Description  Will remain free from falls  Outcome: Ongoing  Goal: Absence of physical injury  Description  Absence of physical injury  Outcome: Ongoing     Problem: OXYGENATION/RESPIRATORY FUNCTION  Goal: Patient will maintain patent airway  3/4/2020 1947 by Luis Sheffield RN  Outcome: Ongoing  3/4/2020 1919 by Alexandre Kinsey RCP  Outcome: Ongoing  3/4/2020 0906 by Cass Mosher RCP  Outcome: Ongoing  Goal: Patient will achieve/maintain normal respiratory rate/effort  Description  Respiratory rate and effort will be within normal limits for the patient  3/4/2020 1947 by Luis Sheffield RN  Outcome: Ongoing  3/4/2020 1919 by Flavia Diamond RCP  Outcome: Ongoing  3/4/2020 0906 by ZULEYMA RojasP  Outcome: Ongoing     Problem: MECHANICAL VENTILATION  Goal: Patient will maintain patent airway  3/4/2020 1947 by Kaylen Conroy RN  Outcome: Ongoing  3/4/2020 1919 by Flavia Diamond RCP  Outcome: Ongoing  3/4/2020 0906 by ZULEYMA RojasP  Outcome: Ongoing  3/4/2020 0554 by Mitzy Damon RN  Outcome: Ongoing  Goal: Oral health is maintained or improved  3/4/2020 1947 by Kaylen Conroy RN  Outcome: Ongoing  3/4/2020 1919 by Flavia Diamond RCP  Outcome: Ongoing  3/4/2020 0906 by ZULEYMA RojasP  Outcome: Ongoing  Goal: ET tube will be managed safely  3/4/2020 1947 by Kaylne Conroy RN  Outcome: Ongoing  3/4/2020 1919 by ZULEYMA LynchP  Outcome: Ongoing  3/4/2020 0906 by Camilla Mak RCP  Outcome: Ongoing  3/4/2020 0554 by Mitzy Damon RN  Outcome: Ongoing  Goal: Ability to express needs and understand communication  3/4/2020 1947 by Kaylen Conroy RN  Outcome: Ongoing  3/4/2020 1919 by Flavia Diamond RCP  Outcome: Ongoing  3/4/2020 0906 by Camilla Mak RCP  Outcome: Ongoing  Goal: Mobility/activity is maintained at optimum level for patient  3/4/2020 1947 by Kaylen Conroy RN  Outcome: Ongoing  3/4/2020 1919 by Flavia Diamond RCP  Outcome: Ongoing  3/4/2020 0906 by ZULEYMA RojasP  Outcome: Ongoing     Problem: SKIN INTEGRITY  Goal: Skin integrity is maintained or improved  3/4/2020 1947 by Kaylen Conroy RN  Outcome: Ongoing  3/4/2020 1919 by Flavia Diamond RCP  Outcome: Ongoing     Problem: Restraint Use - Nonviolent/Non-Self-Destructive Behavior:  Goal: Absence of restraint indications  Description  Absence of restraint indications  3/4/2020 1947 by Kaylen Conroy RN  Outcome: Ongoing  3/4/2020 0554 by Mitzy Damon RN  Outcome: Not Met This Shift  Goal: Absence of restraint-related injury  Description  Absence of restraint-related injury  3/4/2020 1947 by Maria A Force

## 2020-03-05 NOTE — PLAN OF CARE
Problem: OXYGENATION/RESPIRATORY FUNCTION  Goal: Patient will maintain patent airway  3/5/2020 0922 by Anali Grewal RCP  Outcome: Ongoing  3/4/2020 1947 by Michelle Agarwal RN  Outcome: Ongoing     Problem: OXYGENATION/RESPIRATORY FUNCTION  Goal: Patient will achieve/maintain normal respiratory rate/effort  Description  Respiratory rate and effort will be within normal limits for the patient  3/5/2020 6998 by ZULEYMA RiveraP  Outcome: Ongoing  3/4/2020 1947 by Michelle Agarwal RN  Outcome: Ongoing     Problem: MECHANICAL VENTILATION  Goal: Patient will maintain patent airway  3/5/2020 0922 by Anali Grewal RCP  Outcome: Ongoing  3/4/2020 1947 by Michelle Agarwal RN  Outcome: Ongoing     Problem: MECHANICAL VENTILATION  Goal: Oral health is maintained or improved  3/5/2020 0922 by Anali Grewal RCP  Outcome: Ongoing  3/4/2020 1947 by Michelle Agarwal RN  Outcome: Ongoing     Problem: MECHANICAL VENTILATION  Goal: ET tube will be managed safely  3/5/2020 0922 by Anali Grewal RCP  Outcome: Ongoing  3/4/2020 1947 by Michelle Agarwal RN  Outcome: Ongoing     Problem: MECHANICAL VENTILATION  Goal: Mobility/activity is maintained at optimum level for patient  3/5/2020 8990 by Anali Grewal RCP  Outcome: Ongoing  3/4/2020 1947 by Michelle Agarwal RN  Outcome: Ongoing

## 2020-03-05 NOTE — CARE COORDINATION
Transition planning:  Met with pt's son Jim Adams at bedside, pt is awake remains intubated nods head yes to speak with son and returns to eyes closed and does not participate with conversation. Discussed d/c needs and plans informed pt may likely need SNF or rehab after hospital stay d/t prolonged ventilation and weakness. He informs that his mom will not go to a SNF and she will return home. His brother Hillary Disla is the primary local point of contact and he will discuss with him. He informs they have a list.   He informs their goal is for the pt to return home and continue with [address] and additional support through the [employer]. CM provided a [employer] pamphlet with contact information and description of programs. Discussed pt having two recent inpatient stays in the past 30-days. He informs he understands but his mom will not go to a SNF. He does inform that she has bad knees and lives alone. Reassurance offered and CM informs we will continue to follow pt's progress. CM to continue to follow and monitor progress    [address] from Kartik Garcia on unit, she informs that she will need an updated home oxygen order and may need a new home O2 eval. She will continue to follow pt's progress.

## 2020-03-05 NOTE — FLOWSHEET NOTE
DATE: 3/5/2020  NAME: Adalberto Johnson  MRN: [id number]   : 1945    Discharge Recommendations: Further therapy recommended at discharge. Subjective: RN agreeable to ROM. Pain: Pt shook head \"yes\" but did not rate pain and could not give location  Patient follows: Most Commands  Is patient on ventilator: Yes  Is patient on sedation: No  Precautions: B wrist restraints    Therapeutic exercises:  UE/LE(s)  Bilateral Active-assist range of motion all planes x 10 reps  bilateral gastrocnemius stretching 2 reps x 30 seconds    Goals  Short Term Goals  Short term goal 1: Pt to participate in AROM x4 extremities for gross strengthening  Short term goal 2: Pt to tolerate 30 minutes of therapy for endurance  Short term goal 3: Progress mobility as medically able          Plan: Progress functional mobility as medically appropriate.    Time In: 1153 am  Time Out: 1210 pm  Time Coded Minutes (treatment minutes): 17  Rehab Potential: fair  Treatments/week: 2-3x until extubated    [address], PTA

## 2020-03-05 NOTE — TELEPHONE ENCOUNTER
Pt in hospital - assume this issue was addressed with daily round of communication assist from hospital staff

## 2020-03-05 NOTE — PROGRESS NOTES
03/05/2020    PHOS 3.6 03/01/2020     S. Calcium:  Recent Labs     03/05/20  0424   CALCIUM 8.5*     Urinalysis:   Lab Results   Component Value Date    NITRU NEGATIVE 02/29/2020    COLORU YELLOW 02/29/2020    PHUR 6.0 02/29/2020    WBCUA None 02/29/2020    RBCUA 2 TO 5 02/29/2020    MUCUS NOT REPORTED 02/29/2020    TRICHOMONAS NOT REPORTED 02/29/2020    YEAST NOT REPORTED 02/29/2020    BACTERIA NOT REPORTED 02/29/2020    CLARITYU Clear 06/29/2018    SPECGRAV 1.059 02/29/2020    LEUKOCYTESUR NEGATIVE 02/29/2020    UROBILINOGEN Normal 02/29/2020    BILIRUBINUR NEGATIVE 02/29/2020    BILIRUBINUR Negative 06/29/2018    BLOODU Non-hemolyzed trace 06/29/2018    GLUCOSEU NEGATIVE 02/29/2020    KETUA NEGATIVE 02/29/2020    AMORPHOUS NOT REPORTED 02/29/2020     Last 3 Blood Glucose:   Recent Labs     03/03/20  0715 03/05/20  0424   GLUCOSE 107* 137*      Cultures during this admission:     Blood cultures:                 [x] None drawn      [] Negative             []  Positive (Details:  )  Urine Culture:                   [x] None drawn      [] Negative             []  Positive (Details:  )  Sputum Culture:               [x] None drawn       [] Negative             []  Positive (Details:  )   Endotracheal aspirate:     [x] None drawn       [] Negative             []  Positive (Details:  )      ASSESSMENT:     Active Problems:    Aspiration pneumonia (HCC)    Centrilobular emphysema (HCC)    Atelectasis    Pleural effusion    Esophageal dilatation  Resolved Problems:    * No resolved hospital problems. *    PLAN:   1. Status post intubated on 3/1/2020 due to high O2 requirment and to prevent resp failure. Currently on continuous fentanyl and Versed.-Continue to wean ventilator, consider extubation tomorrow if able to have bowel movement decompress abdomen  2. Bilateral lower lobe consolidation with possible aspiration as well. Continue IV aztreonam and IV clindamycin.  Continue duonebs and RT aerosol protocol.  Start Iv

## 2020-03-05 NOTE — PROGRESS NOTES
Surgery Progress Note            PATIENT NAME: Elena Ramirez     TODAY'S DATE: 3/5/2020, 6:57 AM    SUBJECTIVE:    Pt S+E. FiO2 slowing weaning down. Alert and denies abdominal pain. No bowel movement in several days. No NGT output. OBJECTIVE:   VITALS:  /69   Pulse 83   Temp 97.9 °F (36.6 °C) (Core)   Resp 20   Ht 5' 2\" (1.575 m)   Wt 197 lb 12 oz (89.7 kg)   SpO2 94%   BMI 36.17 kg/m²      INTAKE/OUTPUT:      Intake/Output Summary (Last 24 hours) at 3/5/2020 0657  Last data filed at 3/5/2020 0600  Gross per 24 hour   Intake 3087.3 ml   Output 893 ml   Net 2194.3 ml       PHYSICAL EXAM  Constitutional:  Vital signs are normal. Alert and awake. HEENT:      Head: Normocephalic. Atraumatic     Eyes: pupils are equal and reactive. Cardiovascular: Regular rate and rhythm  Pulmonary/Chest:Mechanical ventilation, decreasing FiO2, rhonci heard b/l  Neurological: No gross motor deficits  Skin: Skin is warm, dry and intact. POD#11 from hiatal hernia repair with syeda funduplication  Pneumonia- AM CXR, Abx per ICU, vent management per ICU     Plan:  1. Keep NPO; continue TPN  2. IVF hydration - recommend total fluid order - currently running at 200 cc/hr. 3. Bowel regimen: No bowel movement with suppository yesterday - recommend soap suds enema today  4. Mechanical ventilation per critical care  5. Medical management per primary team. Will follow along. Electronically signed by Bryant Horton DO  on 3/5/2020 at 6:57 AM     Patient seen and examined. Agree with above A/P. Still not weaning off vent well which is not entirely unexpected. Appreciate [facility] help. Will be more aggressive today trying to get bowels working.

## 2020-03-06 LAB
ALLEN TEST: ABNORMAL
ANION GAP SERPL CALCULATED.3IONS-SCNC: 12 MMOL/L (ref 9–17)
BUN BLDV-MCNC: 15 MG/DL (ref 8–23)
BUN/CREAT BLD: ABNORMAL (ref 9–20)
CALCIUM SERPL-MCNC: 8.4 MG/DL (ref 8.6–10.4)
CHLORIDE BLD-SCNC: 100 MMOL/L (ref 98–107)
CO2: 23 MMOL/L (ref 20–31)
CREAT SERPL-MCNC: 0.52 MG/DL (ref 0.5–0.9)
CULTURE: NORMAL
CULTURE: NORMAL
FIO2: 40
GFR AFRICAN AMERICAN: >60 ML/MIN
GFR NON-AFRICAN AMERICAN: >60 ML/MIN
GFR SERPL CREATININE-BSD FRML MDRD: ABNORMAL ML/MIN/{1.73_M2}
GFR SERPL CREATININE-BSD FRML MDRD: ABNORMAL ML/MIN/{1.73_M2}
GLUCOSE BLD-MCNC: 125 MG/DL (ref 70–99)
Lab: NORMAL
Lab: NORMAL
MAGNESIUM: 1.9 MG/DL (ref 1.6–2.6)
MODE: ABNORMAL
NEGATIVE BASE EXCESS, ART: ABNORMAL (ref 0–2)
O2 DEVICE/FLOW/%: ABNORMAL
PATIENT TEMP: ABNORMAL
PHOSPHORUS: 2.2 MG/DL (ref 2.6–4.5)
POC HCO3: 28.3 MMOL/L (ref 21–28)
POC O2 SATURATION: 91 % (ref 94–98)
POC PCO2 TEMP: ABNORMAL MM HG
POC PCO2: 37.2 MM HG (ref 35–48)
POC PH TEMP: ABNORMAL
POC PH: 7.49 (ref 7.35–7.45)
POC PO2 TEMP: ABNORMAL MM HG
POC PO2: 55.1 MM HG (ref 83–108)
POSITIVE BASE EXCESS, ART: 5 (ref 0–3)
POTASSIUM SERPL-SCNC: 4 MMOL/L (ref 3.7–5.3)
SAMPLE SITE: ABNORMAL
SODIUM BLD-SCNC: 135 MMOL/L (ref 135–144)
SPECIMEN DESCRIPTION: NORMAL
SPECIMEN DESCRIPTION: NORMAL
TCO2 (CALC), ART: 29 MMOL/L (ref 22–29)

## 2020-03-06 PROCEDURE — 94761 N-INVAS EAR/PLS OXIMETRY MLT: CPT

## 2020-03-06 PROCEDURE — 6370000000 HC RX 637 (ALT 250 FOR IP): Performed by: STUDENT IN AN ORGANIZED HEALTH CARE EDUCATION/TRAINING PROGRAM

## 2020-03-06 PROCEDURE — 36600 WITHDRAWAL OF ARTERIAL BLOOD: CPT

## 2020-03-06 PROCEDURE — 6360000002 HC RX W HCPCS: Performed by: STUDENT IN AN ORGANIZED HEALTH CARE EDUCATION/TRAINING PROGRAM

## 2020-03-06 PROCEDURE — 2580000003 HC RX 258: Performed by: STUDENT IN AN ORGANIZED HEALTH CARE EDUCATION/TRAINING PROGRAM

## 2020-03-06 PROCEDURE — 94770 HC ETCO2 MONITOR DAILY: CPT

## 2020-03-06 PROCEDURE — 82803 BLOOD GASES ANY COMBINATION: CPT

## 2020-03-06 PROCEDURE — C9113 INJ PANTOPRAZOLE SODIUM, VIA: HCPCS | Performed by: STUDENT IN AN ORGANIZED HEALTH CARE EDUCATION/TRAINING PROGRAM

## 2020-03-06 PROCEDURE — 94003 VENT MGMT INPAT SUBQ DAY: CPT

## 2020-03-06 PROCEDURE — 83735 ASSAY OF MAGNESIUM: CPT

## 2020-03-06 PROCEDURE — 84100 ASSAY OF PHOSPHORUS: CPT

## 2020-03-06 PROCEDURE — 80048 BASIC METABOLIC PNL TOTAL CA: CPT

## 2020-03-06 PROCEDURE — 2500000003 HC RX 250 WO HCPCS: Performed by: STUDENT IN AN ORGANIZED HEALTH CARE EDUCATION/TRAINING PROGRAM

## 2020-03-06 PROCEDURE — 94640 AIRWAY INHALATION TREATMENT: CPT

## 2020-03-06 PROCEDURE — 97110 THERAPEUTIC EXERCISES: CPT

## 2020-03-06 PROCEDURE — 2000000000 HC ICU R&B

## 2020-03-06 PROCEDURE — 36415 COLL VENOUS BLD VENIPUNCTURE: CPT

## 2020-03-06 PROCEDURE — 2700000000 HC OXYGEN THERAPY PER DAY

## 2020-03-06 RX ORDER — LORAZEPAM 2 MG/ML
0.5 INJECTION INTRAMUSCULAR ONCE
Status: COMPLETED | OUTPATIENT
Start: 2020-03-06 | End: 2020-03-06

## 2020-03-06 RX ADMIN — Medication 10 ML: at 07:43

## 2020-03-06 RX ADMIN — IPRATROPIUM BROMIDE AND ALBUTEROL SULFATE 1 AMPULE: .5; 3 SOLUTION RESPIRATORY (INHALATION) at 15:25

## 2020-03-06 RX ADMIN — PANTOPRAZOLE SODIUM 40 MG: 40 INJECTION, POWDER, FOR SOLUTION INTRAVENOUS at 07:43

## 2020-03-06 RX ADMIN — BISACODYL 10 MG: 10 SUPPOSITORY RECTAL at 07:44

## 2020-03-06 RX ADMIN — DEXMEDETOMIDINE HYDROCHLORIDE 0.2 MCG/KG/HR: 100 INJECTION, SOLUTION INTRAVENOUS at 20:13

## 2020-03-06 RX ADMIN — Medication 7 MG/HR: at 13:30

## 2020-03-06 RX ADMIN — CLINDAMYCIN IN 5 PERCENT DEXTROSE 600 MG: 12 INJECTION, SOLUTION INTRAVENOUS at 20:19

## 2020-03-06 RX ADMIN — LORAZEPAM 0.5 MG: 2 INJECTION INTRAMUSCULAR; INTRAVENOUS at 19:21

## 2020-03-06 RX ADMIN — Medication 8 MG/HR: at 01:09

## 2020-03-06 RX ADMIN — Medication 100 MCG/HR: at 23:05

## 2020-03-06 RX ADMIN — I.V. FAT EMULSION 100 ML: 20 EMULSION INTRAVENOUS at 18:19

## 2020-03-06 RX ADMIN — Medication 240 ML: at 10:16

## 2020-03-06 RX ADMIN — POTASSIUM CHLORIDE: 2 INJECTION, SOLUTION, CONCENTRATE INTRAVENOUS at 18:19

## 2020-03-06 RX ADMIN — SODIUM CHLORIDE, POTASSIUM CHLORIDE, SODIUM LACTATE AND CALCIUM CHLORIDE: 600; 310; 30; 20 INJECTION, SOLUTION INTRAVENOUS at 03:36

## 2020-03-06 RX ADMIN — CLINDAMYCIN IN 5 PERCENT DEXTROSE 600 MG: 12 INJECTION, SOLUTION INTRAVENOUS at 14:01

## 2020-03-06 RX ADMIN — IPRATROPIUM BROMIDE AND ALBUTEROL SULFATE 1 AMPULE: .5; 3 SOLUTION RESPIRATORY (INHALATION) at 11:25

## 2020-03-06 RX ADMIN — ENOXAPARIN SODIUM 40 MG: 40 INJECTION SUBCUTANEOUS at 07:44

## 2020-03-06 RX ADMIN — PANTOPRAZOLE SODIUM 40 MG: 40 INJECTION, POWDER, FOR SOLUTION INTRAVENOUS at 20:19

## 2020-03-06 RX ADMIN — Medication 100 MCG/HR: at 13:50

## 2020-03-06 RX ADMIN — ACETAMINOPHEN 650 MG: 650 SUPPOSITORY RECTAL at 01:48

## 2020-03-06 RX ADMIN — IPRATROPIUM BROMIDE AND ALBUTEROL SULFATE 1 AMPULE: .5; 3 SOLUTION RESPIRATORY (INHALATION) at 19:19

## 2020-03-06 RX ADMIN — METHYLPREDNISOLONE SODIUM SUCCINATE 40 MG: 40 INJECTION, POWDER, FOR SOLUTION INTRAMUSCULAR; INTRAVENOUS at 18:19

## 2020-03-06 RX ADMIN — IPRATROPIUM BROMIDE AND ALBUTEROL SULFATE 1 AMPULE: .5; 3 SOLUTION RESPIRATORY (INHALATION) at 07:46

## 2020-03-06 RX ADMIN — METHYLPREDNISOLONE SODIUM SUCCINATE 40 MG: 40 INJECTION, POWDER, FOR SOLUTION INTRAMUSCULAR; INTRAVENOUS at 03:40

## 2020-03-06 RX ADMIN — METHYLPREDNISOLONE SODIUM SUCCINATE 40 MG: 40 INJECTION, POWDER, FOR SOLUTION INTRAMUSCULAR; INTRAVENOUS at 10:02

## 2020-03-06 RX ADMIN — SODIUM CHLORIDE, PRESERVATIVE FREE 10 ML: 5 INJECTION INTRAVENOUS at 20:20

## 2020-03-06 RX ADMIN — SODIUM CHLORIDE, POTASSIUM CHLORIDE, SODIUM LACTATE AND CALCIUM CHLORIDE: 600; 310; 30; 20 INJECTION, SOLUTION INTRAVENOUS at 14:03

## 2020-03-06 RX ADMIN — CLINDAMYCIN IN 5 PERCENT DEXTROSE 600 MG: 12 INJECTION, SOLUTION INTRAVENOUS at 05:14

## 2020-03-06 RX ADMIN — Medication 100 MCG/HR: at 05:08

## 2020-03-06 ASSESSMENT — PULMONARY FUNCTION TESTS
PIF_VALUE: 16
PIF_VALUE: 25
PIF_VALUE: 25
PIF_VALUE: 21
PIF_VALUE: 20
PIF_VALUE: 20
PIF_VALUE: 27
PIF_VALUE: 12
PIF_VALUE: 41
PIF_VALUE: 18
PIF_VALUE: 25
PIF_VALUE: 27

## 2020-03-06 NOTE — FLOWSHEET NOTE
DATE: 3/6/2020  NAME: Ge Reno  MRN: [id number]   : 1945    Discharge Recommendations: Continue to Assess (pending progress)     Subjective: RN and pt agreeable to ROM  Pain: shook head \"no\"  Patient follows: Most Commands  Is patient on ventilator: Yes  Is patient on sedation: No  Precautions: B wrist restraints    Therapeutic exercises:  UE/LE(s)  Bilateral Active-assist range of motion all planes x 10 reps  bilateral gastrocnemius stretching 2 reps x 30 seconds    Goals  Short Term Goals  Short term goal 1: Pt to participate in AROM x4 extremities for gross strengthening  Short term goal 2: Pt to tolerate 30 minutes of therapy for endurance  Short term goal 3: Progress mobility as medically able          Plan: Progress functional mobility as medically appropriate.    Time In: 239 pm  Time Out: 256  pm  Time Coded Minutes (treatment minutes): 15 minutes  Rehab Potential: fair  Treatments/week: 2-3x     Jennifer Zhou, PTA

## 2020-03-06 NOTE — FLOWSHEET NOTE
Patient fading in and out of sleep. Her two sons were present in room. They were in good spirits, with one of them saying he is relieved that she is going to get better and that it's just going to take some time. Engaged in conversation. Good sense of humor.  provided active listening and emotional support. Family expressed gratitude for visit. Follow as needed/requested.        03/06/20 1541   Encounter Summary   Services provided to: Patient and family together   Referral/Consult From: [facility] Family members   Continue Visiting   (3/6/2020)   Complexity of Encounter Low   Length of Encounter 15 minutes   Spiritual Assessment Completed Yes   Routine   Type Follow up   Assessment Approachable;Sleeping   Intervention Explored feelings, thoughts, concerns;Sustaining presence/ Ministry of presence   Outcome Expressed gratitude;Coping

## 2020-03-06 NOTE — PROGRESS NOTES
VITAL SIGNS:  BP (!) 122/59   Pulse 81   Temp 100.6 °F (38.1 °C) (Core)   Resp 29   Ht 5' 2\" (1.575 m)   Wt 207 lb 0.2 oz (93.9 kg)   SpO2 96%   BMI 37.86 kg/m²   Tmax over 24 hours:  Temp (24hrs), Av.4 °F (37.4 °C), Min:98.6 °F (37 °C), Max:100.6 °F (38.1 °C)    Patient Vitals for the past 8 hrs:   BP Temp Temp src Pulse Resp SpO2 Weight   20 0600 (!) 122/59 -- -- 81 29 -- 207 lb 0.2 oz (93.9 kg)   20 0500 (!) 141/77 -- -- 91 27 96 % --   20 0400 116/65 100.6 °F (38.1 °C) CORE 69 25 95 % --   20 0309 -- -- -- 72 25 94 % --   20 0300 (!) 109/51 -- -- 72 25 -- --   20 0200 (!) 142/66 -- -- 90 (!) 36 -- --   20 0100 (!) 117/57 -- -- 77 26 -- --   20 0038 -- -- -- 94 (!) 39 -- --   20 0037 -- -- -- 96 (!) 35 -- --   20 0034 -- -- -- 92 (!) 31 -- --   20 0033 -- -- -- 94 (!) 31 -- --   20 0008 -- -- -- 86 (!) 34 -- --   20 0006 -- -- -- 97 (!) 34 -- --   20 0002 -- -- -- 87 (!) 35 -- --   20 0000 126/69 99.9 °F (37.7 °C) CORE 79 29 92 % --       Intake/Output Summary (Last 24 hours) at 3/6/2020 0730  Last data filed at 3/6/2020 0400  Gross per 24 hour   Intake 3826.42 ml   Output 1110 ml   Net 2716.42 ml     Date 20 0000 - 20 2359   Shift 4801-7758 9310-1942 3979-9116 24 Hour Total   INTAKE   I.V.(mL/kg) 846. 9(9)   846. 9(9)   TPN(mL/kg) 563.4(6)   563.4(6)   Shift Total(mL/kg) 1410.2(15)   1410.2(15)   OUTPUT   Urine(mL/kg/hr) 365   365   Shift Total(mL/kg) 365(3.9)   365(3.9)   Weight (kg) 93.9 93.9 93.9 93.9     Wt Readings from Last 3 Encounters:   20 207 lb 0.2 oz (93.9 kg)   20 186 lb 1.1 oz (84.4 kg)   20 183 lb 6.4 oz (83.2 kg)     Body mass index is 37.86 kg/m². PHYSICAL EXAM:  Constitutional: intubated, awake and alert. Following commands.   Respiratory: Clear to auscultation on anterior chest.  Cardiovascular: regular rate and rhythm, normal S1, S2, no murmur noted  Abdomen: Date    NITRU NEGATIVE 02/29/2020    COLORU YELLOW 02/29/2020    PHUR 6.0 02/29/2020    WBCUA None 02/29/2020    RBCUA 2 TO 5 02/29/2020    MUCUS NOT REPORTED 02/29/2020    TRICHOMONAS NOT REPORTED 02/29/2020    YEAST NOT REPORTED 02/29/2020    BACTERIA NOT REPORTED 02/29/2020    CLARITYU Clear 06/29/2018    SPECGRAV 1.059 02/29/2020    LEUKOCYTESUR NEGATIVE 02/29/2020    UROBILINOGEN Normal 02/29/2020    BILIRUBINUR NEGATIVE 02/29/2020    BILIRUBINUR Negative 06/29/2018    BLOODU Non-hemolyzed trace 06/29/2018    GLUCOSEU NEGATIVE 02/29/2020    KETUA NEGATIVE 02/29/2020    AMORPHOUS NOT REPORTED 02/29/2020     LFTS  Recent Labs     03/05/20  0424   ALKPHOS 79   ALT 61*   AST 22   BILITOT 0.25*   LABALBU 2.0*     Last 3 Blood Glucose:   Recent Labs     03/05/20  0424 03/06/20  0507   GLUCOSE 137* 125*      Cultures during this admission:     Blood cultures:                 [x] None drawn      [] Negative             []  Positive (Details:  )  Urine Culture:                   [x] None drawn      [] Negative             []  Positive (Details:  )  Sputum Culture:               [x] None drawn       [] Negative             []  Positive (Details:  )   Endotracheal aspirate:     [x] None drawn       [] Negative             []  Positive (Details:  )    ASSESSMENT:     Active Problems:    Aspiration pneumonia (HCC)    Centrilobular emphysema (HCC)    Atelectasis    Pleural effusion    Esophageal dilatation  Resolved Problems:    * No resolved hospital problems. *    PLAN:   1. Status post intubated on 3/1/2020 due to high O2 requirment and to prevent resp failure. Currently on continuous fentanyl and Versed. Currently on PRVC with 55/12/20/420. No ABGs obtained this morning. 2. Bilateral lower lobe consolidation with possible aspiration. IV aztreonam stopped on 3/4. Continue IV clindamycin.  Continue duonebs, RT aerosol protocol and Solu-Medrol 40 every 8 h.   3. Centrilobular emphysema.  Continue as needed DuoNeb and RT aerosol protocol. Continue Iv solumedrol 40 Q8h. 4. Hypertension. Hold antihypertensive due to multiple episodes of hypotension. 5. Status post laparoscopic hiatal hernia repair and Nissen fundoplication on 3/93/9700.  Bariatric following. 6. Esophageal dilatation. NG placed in the esophagus. Patient currently on TPN. 7. Mild BENEDICT likely due to hypotension. Resolved.   8. Possible upper GI bleed.  Hemoglobin stable at 9.6. Continue IV Protonix for now. Continue to monitor Hb.         Continue DVT prophylaxis with Lovenox and EPC cuff  Continue GI prophylaxis with Protonix  PT/OT consulted          MD Bernice.  Internal Medicine Resident   [facility]   3/6/2020, 7:30 AM

## 2020-03-06 NOTE — PLAN OF CARE
Ongoing  3/6/2020 0555 by Lola Block RN  Outcome: Ongoing  Goal: Absence of physical injury  Description  Absence of physical injury  Outcome: Ongoing     Problem: OXYGENATION/RESPIRATORY FUNCTION  Goal: Patient will maintain patent airway  3/6/2020 1636 by Nu Sanchez RN  Outcome: Ongoing  3/6/2020 0555 by Lola Block RN  Outcome: Ongoing  Goal: Patient will achieve/maintain normal respiratory rate/effort  Description  Respiratory rate and effort will be within normal limits for the patient  Outcome: Ongoing     Problem: MECHANICAL VENTILATION  Goal: Patient will maintain patent airway  Outcome: Ongoing  Goal: Oral health is maintained or improved  Outcome: Ongoing  Goal: ET tube will be managed safely  Outcome: Ongoing  Goal: Ability to express needs and understand communication  Outcome: Ongoing  Goal: Mobility/activity is maintained at optimum level for patient  Outcome: Ongoing     Problem: SKIN INTEGRITY  Goal: Skin integrity is maintained or improved  Outcome: Ongoing     Problem: Restraint Use - Nonviolent/Non-Self-Destructive Behavior:  Goal: Absence of restraint-related injury  Description  Absence of restraint-related injury  3/6/2020 1636 by Nu Sanchez RN  Outcome: Ongoing  3/6/2020 0555 by Lola Block RN  Outcome: Ongoing     Problem: Nutrition  Goal: Optimal nutrition therapy  Outcome: Ongoing     Problem: Musculor/Skeletal Functional Status  Goal: Highest potential functional level  Outcome: Ongoing

## 2020-03-06 NOTE — PROGRESS NOTES
Nutrition Assessment (Parenteral Nutrition)    Type and Reason for Visit: Reassess    Nutrition Recommendations: Continue Parenteral Nutrition-increase Na, Ca, Mg, and Phos. Will continue to monitor PN adequacy/ labs - modify PN as needed. Nutrition Assessment: Pt remains on vent. TPN continues. No BM x few days. Malnutrition Assessment:  · Malnutrition Status: At risk for malnutrition  · Context: Acute illness or injury  · Findings of the 6 clinical characteristics of malnutrition (Minimum of 2 out of 6 clinical characteristics is required to make the diagnosis of moderate or severe Protein Calorie Malnutrition based on AND/ASPEN Guidelines):  1. Energy Intake-Less than or equal to 75% of estimated energy requirement, Greater than or equal to 7 days    2. Weight Loss-No significant weight loss, (3 weeks)  3. Fat Loss-No significant subcutaneous fat loss,    4. Muscle Loss-No significant muscle mass loss,    5. Fluid Accumulation-No significant fluid accumulation,    6.   Strength-Not measured    Nutrition Risk Level: High    Nutrient Needs:  · Estimated Daily Total Kcal: 5361-8513 kcal/day   · Estimated Daily Protein (g):  g pro/day     Nutrition Diagnosis:   · Problem: Inadequate oral intake  · Etiology: related to Alteration in GI function, Impaired respiratory function-inability to consume food     Signs and symptoms:  as evidenced by NPO status due to medical condition, Nutrition support - PN    Objective Information:  · Current Nutrition Therapies:  · Oral Diet Orders: NPO   · Parenteral Nutrition Orders:  · Type and Formula: 2-in-1 Custom(250 g Dex, 80 g AA)   · Lipids: 100ml, Daily  · Rate/Volume: 55 mL/hr   · Duration: Continuous  · Current PN Order Provides: 1370 kcal and 80 g pro/day   · Additional Calories: none  · Anthropometric Measures:  · Ht: 5' 2\" (157.5 cm)   · Current Body Wt: 207 lb 0.2 oz (93.9 kg)  · Admission Body Wt: 197 lb 12 oz (89.7 kg)  · Usual Body Wt: (186 lb on

## 2020-03-06 NOTE — PLAN OF CARE
Problem: OXYGENATION/RESPIRATORY FUNCTION  Goal: Patient will maintain patent airway  3/5/2020 1939 by Franklin Jorge RCP  Outcome: Ongoing     Problem: OXYGENATION/RESPIRATORY FUNCTION  Goal: Patient will achieve/maintain normal respiratory rate/effort  Description  Respiratory rate and effort will be within normal limits for the patient  3/5/2020 1939 by Franklin Jorge RCP  Outcome: Ongoing     Problem: MECHANICAL VENTILATION  Goal: Patient will maintain patent airway  3/5/2020 1939 by Franklin Jorge RCP  Outcome: Ongoing     Problem: MECHANICAL VENTILATION  Goal: Oral health is maintained or improved  3/5/2020 1939 by Franklin Jorge RCP  Outcome: Ongoing     Problem: MECHANICAL VENTILATION  Goal: ET tube will be managed safely  3/5/2020 1939 by Franklin Jorge RCP  Outcome: Ongoing     Problem: MECHANICAL VENTILATION  Goal: Ability to express needs and understand communication  3/5/2020 1939 by Franklin Jorge RCP  Outcome: Ongoing     Problem: MECHANICAL VENTILATION  Goal: Mobility/activity is maintained at optimum level for patient  3/5/2020 1939 by Franklin Jorge RCP  Outcome: Ongoing     Problem: SKIN INTEGRITY  Goal: Skin integrity is maintained or improved  Outcome: Ongoing

## 2020-03-07 LAB
ABSOLUTE EOS #: 0 K/UL (ref 0–0.4)
ABSOLUTE IMMATURE GRANULOCYTE: 0.78 K/UL (ref 0–0.3)
ABSOLUTE LYMPH #: 1.67 K/UL (ref 1–4.8)
ABSOLUTE MONO #: 0 K/UL (ref 0.1–0.8)
ALLEN TEST: POSITIVE
ANION GAP SERPL CALCULATED.3IONS-SCNC: 12 MMOL/L (ref 9–17)
BASOPHILS # BLD: 0 % (ref 0–2)
BASOPHILS ABSOLUTE: 0 K/UL (ref 0–0.2)
BUN BLDV-MCNC: 16 MG/DL (ref 8–23)
BUN/CREAT BLD: ABNORMAL (ref 9–20)
CALCIUM SERPL-MCNC: 8.4 MG/DL (ref 8.6–10.4)
CHLORIDE BLD-SCNC: 102 MMOL/L (ref 98–107)
CO2: 22 MMOL/L (ref 20–31)
CREAT SERPL-MCNC: 0.42 MG/DL (ref 0.5–0.9)
DIFFERENTIAL TYPE: ABNORMAL
EKG ATRIAL RATE: 73 BPM
EKG P AXIS: 51 DEGREES
EKG P-R INTERVAL: 146 MS
EKG Q-T INTERVAL: 384 MS
EKG QRS DURATION: 82 MS
EKG QTC CALCULATION (BAZETT): 423 MS
EKG R AXIS: 63 DEGREES
EKG T AXIS: 41 DEGREES
EKG VENTRICULAR RATE: 73 BPM
EOSINOPHILS RELATIVE PERCENT: 0 % (ref 1–4)
FIO2: 60
GFR AFRICAN AMERICAN: >60 ML/MIN
GFR NON-AFRICAN AMERICAN: >60 ML/MIN
GFR SERPL CREATININE-BSD FRML MDRD: ABNORMAL ML/MIN/{1.73_M2}
GFR SERPL CREATININE-BSD FRML MDRD: ABNORMAL ML/MIN/{1.73_M2}
GLUCOSE BLD-MCNC: 143 MG/DL (ref 65–105)
GLUCOSE BLD-MCNC: 177 MG/DL (ref 70–99)
HCT VFR BLD CALC: 30 % (ref 36.3–47.1)
HEMOGLOBIN: 9.3 G/DL (ref 11.9–15.1)
IMMATURE GRANULOCYTES: 7 %
LIPASE: 54 U/L (ref 13–60)
LYMPHOCYTES # BLD: 15 % (ref 24–44)
MAGNESIUM: 2 MG/DL (ref 1.6–2.6)
MCH RBC QN AUTO: 31.5 PG (ref 25.2–33.5)
MCHC RBC AUTO-ENTMCNC: 31 G/DL (ref 28.4–34.8)
MCV RBC AUTO: 101.7 FL (ref 82.6–102.9)
MODE: ABNORMAL
MONOCYTES # BLD: 0 % (ref 1–7)
MORPHOLOGY: ABNORMAL
NEGATIVE BASE EXCESS, ART: ABNORMAL (ref 0–2)
NRBC AUTOMATED: 0.2 PER 100 WBC
O2 DEVICE/FLOW/%: ABNORMAL
PATIENT TEMP: ABNORMAL
PDW BLD-RTO: 14.7 % (ref 11.8–14.4)
PHOSPHORUS: 3.2 MG/DL (ref 2.6–4.5)
PLATELET # BLD: 265 K/UL (ref 138–453)
PLATELET ESTIMATE: ABNORMAL
PMV BLD AUTO: 12.3 FL (ref 8.1–13.5)
POC HCO3: 26.5 MMOL/L (ref 21–28)
POC O2 SATURATION: 90 % (ref 94–98)
POC PCO2 TEMP: ABNORMAL MM HG
POC PCO2: 36.9 MM HG (ref 35–48)
POC PH TEMP: ABNORMAL
POC PH: 7.46 (ref 7.35–7.45)
POC PO2 TEMP: ABNORMAL MM HG
POC PO2: 54.5 MM HG (ref 83–108)
POSITIVE BASE EXCESS, ART: 3 (ref 0–3)
POTASSIUM SERPL-SCNC: 4.4 MMOL/L (ref 3.7–5.3)
RBC # BLD: 2.95 M/UL (ref 3.95–5.11)
RBC # BLD: ABNORMAL 10*6/UL
SAMPLE SITE: ABNORMAL
SEG NEUTROPHILS: 78 % (ref 36–66)
SEGMENTED NEUTROPHILS ABSOLUTE COUNT: 8.65 K/UL (ref 1.8–7.7)
SODIUM BLD-SCNC: 136 MMOL/L (ref 135–144)
TCO2 (CALC), ART: 28 MMOL/L (ref 22–29)
TRIGL SERPL-MCNC: 209 MG/DL
TROPONIN INTERP: ABNORMAL
TROPONIN T: ABNORMAL NG/ML
TROPONIN, HIGH SENSITIVITY: 19 NG/L (ref 0–14)
WBC # BLD: 11.1 K/UL (ref 3.5–11.3)
WBC # BLD: ABNORMAL 10*3/UL

## 2020-03-07 PROCEDURE — 82947 ASSAY GLUCOSE BLOOD QUANT: CPT

## 2020-03-07 PROCEDURE — 93005 ELECTROCARDIOGRAM TRACING: CPT | Performed by: STUDENT IN AN ORGANIZED HEALTH CARE EDUCATION/TRAINING PROGRAM

## 2020-03-07 PROCEDURE — 2500000003 HC RX 250 WO HCPCS: Performed by: STUDENT IN AN ORGANIZED HEALTH CARE EDUCATION/TRAINING PROGRAM

## 2020-03-07 PROCEDURE — 82803 BLOOD GASES ANY COMBINATION: CPT

## 2020-03-07 PROCEDURE — 83690 ASSAY OF LIPASE: CPT

## 2020-03-07 PROCEDURE — 6370000000 HC RX 637 (ALT 250 FOR IP): Performed by: STUDENT IN AN ORGANIZED HEALTH CARE EDUCATION/TRAINING PROGRAM

## 2020-03-07 PROCEDURE — 94761 N-INVAS EAR/PLS OXIMETRY MLT: CPT

## 2020-03-07 PROCEDURE — 36415 COLL VENOUS BLD VENIPUNCTURE: CPT

## 2020-03-07 PROCEDURE — 2700000000 HC OXYGEN THERAPY PER DAY

## 2020-03-07 PROCEDURE — 94770 HC ETCO2 MONITOR DAILY: CPT

## 2020-03-07 PROCEDURE — 2580000003 HC RX 258: Performed by: STUDENT IN AN ORGANIZED HEALTH CARE EDUCATION/TRAINING PROGRAM

## 2020-03-07 PROCEDURE — 83735 ASSAY OF MAGNESIUM: CPT

## 2020-03-07 PROCEDURE — C9113 INJ PANTOPRAZOLE SODIUM, VIA: HCPCS | Performed by: STUDENT IN AN ORGANIZED HEALTH CARE EDUCATION/TRAINING PROGRAM

## 2020-03-07 PROCEDURE — 93010 ELECTROCARDIOGRAM REPORT: CPT | Performed by: INTERNAL MEDICINE

## 2020-03-07 PROCEDURE — 6360000002 HC RX W HCPCS: Performed by: STUDENT IN AN ORGANIZED HEALTH CARE EDUCATION/TRAINING PROGRAM

## 2020-03-07 PROCEDURE — 2000000000 HC ICU R&B

## 2020-03-07 PROCEDURE — 84478 ASSAY OF TRIGLYCERIDES: CPT

## 2020-03-07 PROCEDURE — 80048 BASIC METABOLIC PNL TOTAL CA: CPT

## 2020-03-07 PROCEDURE — 99291 CRITICAL CARE FIRST HOUR: CPT | Performed by: INTERNAL MEDICINE

## 2020-03-07 PROCEDURE — 85025 COMPLETE CBC W/AUTO DIFF WBC: CPT

## 2020-03-07 PROCEDURE — 94640 AIRWAY INHALATION TREATMENT: CPT

## 2020-03-07 PROCEDURE — 84484 ASSAY OF TROPONIN QUANT: CPT

## 2020-03-07 PROCEDURE — 84100 ASSAY OF PHOSPHORUS: CPT

## 2020-03-07 PROCEDURE — 37799 UNLISTED PX VASCULAR SURGERY: CPT

## 2020-03-07 PROCEDURE — 94003 VENT MGMT INPAT SUBQ DAY: CPT

## 2020-03-07 RX ORDER — OLANZAPINE 10 MG/1
5 INJECTION, POWDER, LYOPHILIZED, FOR SOLUTION INTRAMUSCULAR ONCE
Status: COMPLETED | OUTPATIENT
Start: 2020-03-07 | End: 2020-03-07

## 2020-03-07 RX ORDER — FENTANYL CITRATE 50 UG/ML
50 INJECTION, SOLUTION INTRAMUSCULAR; INTRAVENOUS
Status: DISCONTINUED | OUTPATIENT
Start: 2020-03-07 | End: 2020-03-09

## 2020-03-07 RX ORDER — HALOPERIDOL 5 MG/ML
5 INJECTION INTRAMUSCULAR ONCE
Status: COMPLETED | OUTPATIENT
Start: 2020-03-07 | End: 2020-03-07

## 2020-03-07 RX ADMIN — ENOXAPARIN SODIUM 40 MG: 40 INJECTION SUBCUTANEOUS at 07:15

## 2020-03-07 RX ADMIN — PANTOPRAZOLE SODIUM 40 MG: 40 INJECTION, POWDER, FOR SOLUTION INTRAVENOUS at 21:54

## 2020-03-07 RX ADMIN — SODIUM CHLORIDE, PRESERVATIVE FREE 10 ML: 5 INJECTION INTRAVENOUS at 21:15

## 2020-03-07 RX ADMIN — IPRATROPIUM BROMIDE AND ALBUTEROL SULFATE 1 AMPULE: .5; 3 SOLUTION RESPIRATORY (INHALATION) at 15:22

## 2020-03-07 RX ADMIN — FENTANYL CITRATE 50 MCG: 50 INJECTION, SOLUTION INTRAMUSCULAR; INTRAVENOUS at 22:50

## 2020-03-07 RX ADMIN — DEXMEDETOMIDINE HYDROCHLORIDE 0.8 MCG/KG/HR: 100 INJECTION, SOLUTION INTRAVENOUS at 19:13

## 2020-03-07 RX ADMIN — OLANZAPINE 5 MG: 10 INJECTION, POWDER, LYOPHILIZED, FOR SOLUTION INTRAMUSCULAR at 20:58

## 2020-03-07 RX ADMIN — METHYLPREDNISOLONE SODIUM SUCCINATE 40 MG: 40 INJECTION, POWDER, FOR SOLUTION INTRAMUSCULAR; INTRAVENOUS at 10:02

## 2020-03-07 RX ADMIN — HALOPERIDOL LACTATE 5 MG: 5 INJECTION, SOLUTION INTRAMUSCULAR at 14:36

## 2020-03-07 RX ADMIN — I.V. FAT EMULSION 100 ML: 20 EMULSION INTRAVENOUS at 18:03

## 2020-03-07 RX ADMIN — SODIUM CHLORIDE, POTASSIUM CHLORIDE, SODIUM LACTATE AND CALCIUM CHLORIDE 100 ML/HR: 600; 310; 30; 20 INJECTION, SOLUTION INTRAVENOUS at 01:38

## 2020-03-07 RX ADMIN — METHYLPREDNISOLONE SODIUM SUCCINATE 40 MG: 40 INJECTION, POWDER, FOR SOLUTION INTRAMUSCULAR; INTRAVENOUS at 18:04

## 2020-03-07 RX ADMIN — IPRATROPIUM BROMIDE AND ALBUTEROL SULFATE 1 AMPULE: .5; 3 SOLUTION RESPIRATORY (INHALATION) at 07:31

## 2020-03-07 RX ADMIN — SODIUM CHLORIDE, PRESERVATIVE FREE 10 ML: 5 INJECTION INTRAVENOUS at 21:14

## 2020-03-07 RX ADMIN — DEXMEDETOMIDINE HYDROCHLORIDE 1.3 MCG/KG/HR: 100 INJECTION, SOLUTION INTRAVENOUS at 22:52

## 2020-03-07 RX ADMIN — IPRATROPIUM BROMIDE AND ALBUTEROL SULFATE 1 AMPULE: .5; 3 SOLUTION RESPIRATORY (INHALATION) at 20:11

## 2020-03-07 RX ADMIN — SODIUM CHLORIDE, POTASSIUM CHLORIDE, SODIUM LACTATE AND CALCIUM CHLORIDE: 600; 310; 30; 20 INJECTION, SOLUTION INTRAVENOUS at 16:24

## 2020-03-07 RX ADMIN — FENTANYL CITRATE 50 MCG: 50 INJECTION, SOLUTION INTRAMUSCULAR; INTRAVENOUS at 16:34

## 2020-03-07 RX ADMIN — IPRATROPIUM BROMIDE AND ALBUTEROL SULFATE 1 AMPULE: .5; 3 SOLUTION RESPIRATORY (INHALATION) at 12:03

## 2020-03-07 RX ADMIN — DEXMEDETOMIDINE HYDROCHLORIDE 0.4 MCG/KG/HR: 100 INJECTION, SOLUTION INTRAVENOUS at 09:26

## 2020-03-07 RX ADMIN — WATER 2.1 ML: 1 INJECTION INTRAMUSCULAR; INTRAVENOUS; SUBCUTANEOUS at 21:13

## 2020-03-07 RX ADMIN — BISACODYL 10 MG: 10 SUPPOSITORY RECTAL at 07:15

## 2020-03-07 RX ADMIN — Medication 5 MG/HR: at 00:32

## 2020-03-07 RX ADMIN — CLINDAMYCIN IN 5 PERCENT DEXTROSE 600 MG: 12 INJECTION, SOLUTION INTRAVENOUS at 14:12

## 2020-03-07 RX ADMIN — FENTANYL CITRATE 50 MCG: 50 INJECTION, SOLUTION INTRAMUSCULAR; INTRAVENOUS at 19:52

## 2020-03-07 RX ADMIN — POTASSIUM CHLORIDE: 2 INJECTION, SOLUTION, CONCENTRATE INTRAVENOUS at 18:03

## 2020-03-07 RX ADMIN — PANTOPRAZOLE SODIUM 40 MG: 40 INJECTION, POWDER, FOR SOLUTION INTRAVENOUS at 07:14

## 2020-03-07 RX ADMIN — Medication 10 ML: at 07:15

## 2020-03-07 RX ADMIN — METHYLPREDNISOLONE SODIUM SUCCINATE 40 MG: 40 INJECTION, POWDER, FOR SOLUTION INTRAMUSCULAR; INTRAVENOUS at 02:02

## 2020-03-07 RX ADMIN — CLINDAMYCIN IN 5 PERCENT DEXTROSE 600 MG: 12 INJECTION, SOLUTION INTRAVENOUS at 05:22

## 2020-03-07 ASSESSMENT — PAIN SCALES - GENERAL: PAINLEVEL_OUTOF10: 10

## 2020-03-07 ASSESSMENT — PULMONARY FUNCTION TESTS
PIF_VALUE: 21
PIF_VALUE: 22
PIF_VALUE: 14

## 2020-03-07 NOTE — PROGRESS NOTES
Nutrition Assessment (Parenteral Nutrition)    Type and Reason for Visit: Reassess    Nutrition Recommendations:   -Continue NPO   -Continue PN 2-in-1 central custom @ 55 mL/hr (1320 mLs) + 20% of 100 mL IV lipids+ 250 gms dextrose + 80 gms AA ~> 1370 kcals, 80 gms protein  -Suggest increasing Ca in next bag   -Will continue to monitor TPN and labs     Nutrition Assessment: Pt remains NPO and intubated. TPN continues. Pt remains constipated w/ tinkling bowel sounds. Malnutrition Assessment:  · Malnutrition Status: At risk for malnutrition  · Context: Acute illness or injury  · Findings of the 6 clinical characteristics of malnutrition (Minimum of 2 out of 6 clinical characteristics is required to make the diagnosis of moderate or severe Protein Calorie Malnutrition based on AND/ASPEN Guidelines):  1. Energy Intake-Greater than 75% of estimated energy requirement, (x 2 days )    2. Weight Loss-No significant weight loss, (3 weeks)  3. Fat Loss-No significant subcutaneous fat loss,    4. Muscle Loss-No significant muscle mass loss,    5. Fluid Accumulation-Mild fluid accumulation, Extremities, Generalized  6.   Strength-Not measured    Nutrition Risk Level: High    Nutrient Needs:  · Estimated Daily Total Kcal: 9515-8166 kcal/day   · Estimated Daily Protein (g):  g pro/day     Nutrition Diagnosis:   · Problem: Inadequate oral intake  · Etiology: related to Alteration in GI function, Impaired respiratory function-inability to consume food     Signs and symptoms:  as evidenced by NPO status due to medical condition, Nutrition support - PN    Objective Information:  · Wound Type: Surgical Wound  · Current Nutrition Therapies:  · Oral Diet Orders: NPO   · Parenteral Nutrition Orders:  · Type and Formula: 2-in-1 Custom(250 g Dex, 80 g AA)   · Lipids: 100ml, Daily  · Rate/Volume: 55 mL/hr   · Duration: Continuous  · Current PN Order Provides: 1370 kcal and 80 g pro/day   · Goal PN Orders Provides: 2189 kcal, 80 gm pro/d  · Additional Calories: none  · Anthropometric Measures:  · Ht: 5' 2\" (157.5 cm)   · Current Body Wt: 216 lb (98 kg)  · Admission Body Wt: 197 lb 12 oz (89.7 kg)  · Usual Body Wt: (186 lb on 2/10/20)  · Ideal Body Wt: 110 lb (49.9 kg), % Ideal Body 179% (adm/ideal)   · BMI Classification: BMI 35.0 - 39.9 Obese Class II    Nutrition Interventions:   Continue NPO, Continue Parenteral Nutrition  Continued Inpatient Monitoring, Education Not Indicated    Nutrition Evaluation:   · Evaluation: Goal achieved   · Goals: meet % of estimated nutrition needs    · Monitoring: PN Intake, PN Tolerance, Skin Integrity, Wound Healing, I&O, Weight, Pertinent Labs, Monitor Bowel Function      Electronically signed by Aminata Garcia RD, LD on 3/7/20 at 9:53 AM EST    Contact Number:[phone]

## 2020-03-07 NOTE — PLAN OF CARE
Mental status will be restored to baseline  Description: Mental status will be restored to baseline  Outcome: Ongoing     Problem: Nutrition Deficit:  Goal: Ability to achieve adequate nutritional intake will improve  Description: Ability to achieve adequate nutritional intake will improve  Outcome: Ongoing     Problem: Pain:  Goal: Recognizes and communicates pain  Description: Recognizes and communicates pain  Outcome: Ongoing  Goal: Control of acute pain  Description: Control of acute pain  Outcome: Ongoing  Goal: Control of chronic pain  Description: Control of chronic pain  Outcome: Ongoing     Problem: Skin Integrity - Impaired:  Goal: Will show no infection signs and symptoms  Description: Will show no infection signs and symptoms  Outcome: Ongoing  Goal: Absence of new skin breakdown  Description: Absence of new skin breakdown  Outcome: Ongoing     Problem: Sleep Pattern Disturbance:  Goal: Appears well-rested  Description: Appears well-rested  Outcome: Ongoing     Problem: Risk for Impaired Skin Integrity  Goal: Tissue integrity - skin and mucous membranes  Description: Structural intactness and normal physiological function of skin and  mucous membranes.   Outcome: Ongoing     Problem: Falls - Risk of:  Goal: Will remain free from falls  Description: Will remain free from falls  Outcome: Ongoing  Goal: Absence of physical injury  Description: Absence of physical injury  Outcome: Ongoing     Problem: OXYGENATION/RESPIRATORY FUNCTION  Goal: Patient will maintain patent airway  3/7/2020 1258 by Margueritte Klinefelter, RN  Outcome: Ongoing  3/7/2020 1037 by Devin Guaman RCP  Outcome: Ongoing  3/7/2020 0407 by Fadumo Osorio RCP  Outcome: Ongoing  Goal: Patient will achieve/maintain normal respiratory rate/effort  Description: Respiratory rate and effort will be within normal limits for the patient  3/7/2020 1258 by Margueritte Klinefelter, RN  Outcome: Ongoing  3/7/2020 1037 by Devin Guaman RCP  Outcome:

## 2020-03-07 NOTE — PROGRESS NOTES
03/07/20  0501   CALCIUM 8.4*     Urinalysis:   Lab Results   Component Value Date    NITRU NEGATIVE 02/29/2020    COLORU YELLOW 02/29/2020    PHUR 6.0 02/29/2020    WBCUA None 02/29/2020    RBCUA 2 TO 5 02/29/2020    MUCUS NOT REPORTED 02/29/2020    TRICHOMONAS NOT REPORTED 02/29/2020    YEAST NOT REPORTED 02/29/2020    BACTERIA NOT REPORTED 02/29/2020    CLARITYU Clear 06/29/2018    SPECGRAV 1.059 02/29/2020    LEUKOCYTESUR NEGATIVE 02/29/2020    UROBILINOGEN Normal 02/29/2020    BILIRUBINUR NEGATIVE 02/29/2020    BILIRUBINUR Negative 06/29/2018    BLOODU Non-hemolyzed trace 06/29/2018    GLUCOSEU NEGATIVE 02/29/2020    KETUA NEGATIVE 02/29/2020    AMORPHOUS NOT REPORTED 02/29/2020     LFTS  Recent Labs     03/05/20  0424   ALKPHOS 79   ALT 61*   AST 22   BILITOT 0.25*   LABALBU 2.0*     Last 3 Blood Glucose:   Recent Labs     03/05/20  0424 03/06/20  0507 03/07/20  0501   GLUCOSE 137* 125* 177*      Cultures during this admission:     Blood cultures:                 [x] None drawn      [] Negative             []  Positive (Details:  )  Urine Culture:                   [x] None drawn      [] Negative             []  Positive (Details:  )  Sputum Culture:               [x] None drawn       [] Negative             []  Positive (Details:  )   Endotracheal aspirate:     [x] None drawn       [] Negative             []  Positive (Details:  )    ASSESSMENT:     Active Problems:    Aspiration pneumonia (HCC)    Centrilobular emphysema (HCC)    Atelectasis    Pleural effusion    Esophageal dilatation  Resolved Problems:    * No resolved hospital problems. *    PLAN:   1. Status post intubated on 3/1/2020 due to high O2 requirment and to prevent resp failure and risk of aspiration. on continuous fentanyl, versed and precedex. Currently on PRVC with 60/12/20/420. ABGs from this morning 7.46/36/26/90.  2. Bilateral lower lobe consolidation with possible aspiration. IV aztreonam stopped on 3/4. Continue IV clindamycin. Continue duonebs, RT aerosol protocol and Solu-Medrol 40 every 8 h.    3. Centrilobular emphysema. Continue as needed DuoNeb and RT aerosol protocol. Continue Iv solumedrol 40 Q8h. 4. Hypertension. Hold meds for now as BP is in acceptable range. 5. Status post laparoscopic hiatal hernia repair and Nissen fundoplication on 1/84/7026.  Bariatric following. 6. Esophageal dilatation. NG placed in the esophagus. Patient currently on TPN. 7. Mild BENEDICT likely due to hypotension. Resolved.   8. Possible upper GI bleed.  Hemoglobin stable at 9.6. Continue IV Protonix for now. Continue to monitor Hb.         Continue DVT prophylaxis with Lovenox and EPC cuff  Continue GI prophylaxis with Protonix  PT/OT consulted          MD Bernice.  Internal Medicine Resident   [id number]   3/7/2020, 8:09 AM

## 2020-03-07 NOTE — FLOWSHEET NOTE
Assessment  Patient is a 76year old female. Patient was just extubated and she is very afraid and shaking. Patient is [employer] and so we talk about it being [place]- a day of rest.  A time to trust in the [facility]. Patient son Marquise Yen was in the room on a chair close to the bed. Patient also has another son. Patient said that her chest hurts lot and that she doesn't feel comfortable  In the position  She is laying. Intervention   provided a ministry of presence and active, compassionate listening to the patient and her son.  also  explored the patient's coping skills an her support system.  nurtured hope in the patient letting her know that [facility] would not abandon her.  prayed with the patietn. Outcome  Patient expressed gratitude. 03/07/20 1649   Encounter Summary   Services provided to: Patient and family together   Referral/Consult From: Nurse   Support System Family members   Place of [employer]   Continue Visiting   (3/07/2020)   Complexity of Encounter High   Length of Encounter 30 minutes   Spiritual Assessment Completed Yes   Spiritual/Confucianist   Type Spiritual support   Assessment Approachable;Tearful; Anxious; Fearful;Coping   Intervention Active listening;Explored feelings, thoughts, concerns;Explored coping resources;Nurtured hope;Prayer;Sustaining presence/ Ministry of presence   Outcome Expressed gratitude

## 2020-03-07 NOTE — PLAN OF CARE
Problem: OXYGENATION/RESPIRATORY FUNCTION  Goal: Patient will maintain patent airway  3/7/2020 1037 by ZULEYMA CollazoP  Outcome: Ongoing  3/7/2020 0407 by Keesha Mo RCANGI  Outcome: Ongoing  Goal: Patient will achieve/maintain normal respiratory rate/effort  Description: Respiratory rate and effort will be within normal limits for the patient  3/7/2020 1037 by Shayne Pompa, RCP  Outcome: Ongoing  3/7/2020 0407 by Keesha Mo RCP  Outcome: Ongoing     Problem: MECHANICAL VENTILATION  Goal: Patient will maintain patent airway  3/7/2020 1037 by Shayne Pompa, RCP  Outcome: Ongoing  3/7/2020 0407 by Keesha Mo RCP  Outcome: Ongoing  Goal: Oral health is maintained or improved  3/7/2020 1037 by Shayne Pompa, RCP  Outcome: Ongoing  3/7/2020 0407 by Keesha Mo RCP  Outcome: Ongoing  Goal: ET tube will be managed safely  3/7/2020 1037 by Shayne Pompa, RCP  Outcome: Ongoing  3/7/2020 0407 by Keesha Mo RCP  Outcome: Ongoing  Goal: Ability to express needs and understand communication  3/7/2020 1037 by Shayne Pompa RCP  Outcome: Ongoing  3/7/2020 0407 by Keesha Mo RCP  Outcome: Ongoing  Goal: Mobility/activity is maintained at optimum level for patient  3/7/2020 1037 by Shayne Pompa, RCP  Outcome: Ongoing  3/7/2020 0407 by Keesha Mo RCP  Outcome: Ongoing     Problem: SKIN INTEGRITY  Goal: Skin integrity is maintained or improved  3/7/2020 0407 by Keesha Mo RCP  Outcome: Ongoing

## 2020-03-08 LAB
ABSOLUTE EOS #: 0.27 K/UL (ref 0–0.4)
ABSOLUTE IMMATURE GRANULOCYTE: 0.82 K/UL (ref 0–0.3)
ABSOLUTE LYMPH #: 1.09 K/UL (ref 1–4.8)
ABSOLUTE MONO #: 0.14 K/UL (ref 0.1–0.8)
ALLEN TEST: ABNORMAL
ANION GAP SERPL CALCULATED.3IONS-SCNC: 11 MMOL/L (ref 9–17)
BASOPHILS # BLD: 0 % (ref 0–2)
BASOPHILS ABSOLUTE: 0 K/UL (ref 0–0.2)
BUN BLDV-MCNC: 18 MG/DL (ref 8–23)
BUN/CREAT BLD: ABNORMAL (ref 9–20)
CALCIUM SERPL-MCNC: 8.5 MG/DL (ref 8.6–10.4)
CHLORIDE BLD-SCNC: 101 MMOL/L (ref 98–107)
CO2: 21 MMOL/L (ref 20–31)
CREAT SERPL-MCNC: 0.42 MG/DL (ref 0.5–0.9)
DATE, STOOL #1: ABNORMAL
DATE, STOOL #2: ABNORMAL
DATE, STOOL #3: ABNORMAL
DIFFERENTIAL TYPE: ABNORMAL
EOSINOPHILS RELATIVE PERCENT: 2 % (ref 1–4)
FIO2: 75
GFR AFRICAN AMERICAN: >60 ML/MIN
GFR NON-AFRICAN AMERICAN: >60 ML/MIN
GFR SERPL CREATININE-BSD FRML MDRD: ABNORMAL ML/MIN/{1.73_M2}
GFR SERPL CREATININE-BSD FRML MDRD: ABNORMAL ML/MIN/{1.73_M2}
GLUCOSE BLD-MCNC: 179 MG/DL (ref 74–100)
GLUCOSE BLD-MCNC: 182 MG/DL (ref 70–99)
HCT VFR BLD CALC: 31.3 % (ref 36.3–47.1)
HEMOCCULT SP1 STL QL: POSITIVE
HEMOCCULT SP2 STL QL: ABNORMAL
HEMOCCULT SP3 STL QL: ABNORMAL
HEMOGLOBIN: 10.2 G/DL (ref 11.9–15.1)
IMMATURE GRANULOCYTES: 6 %
LYMPHOCYTES # BLD: 8 % (ref 24–44)
MAGNESIUM: 2 MG/DL (ref 1.6–2.6)
MCH RBC QN AUTO: 32.1 PG (ref 25.2–33.5)
MCHC RBC AUTO-ENTMCNC: 32.6 G/DL (ref 28.4–34.8)
MCV RBC AUTO: 98.4 FL (ref 82.6–102.9)
MODE: ABNORMAL
MONOCYTES # BLD: 1 % (ref 1–7)
MORPHOLOGY: ABNORMAL
NEGATIVE BASE EXCESS, ART: ABNORMAL (ref 0–2)
NRBC AUTOMATED: 0.1 PER 100 WBC
O2 DEVICE/FLOW/%: ABNORMAL
PATIENT TEMP: ABNORMAL
PDW BLD-RTO: 14.6 % (ref 11.8–14.4)
PHOSPHORUS: 3.5 MG/DL (ref 2.6–4.5)
PLATELET # BLD: 381 K/UL (ref 138–453)
PLATELET ESTIMATE: ABNORMAL
PMV BLD AUTO: 12.1 FL (ref 8.1–13.5)
POC HCO3: 25.2 MMOL/L (ref 21–28)
POC O2 SATURATION: 94 % (ref 94–98)
POC PCO2 TEMP: ABNORMAL MM HG
POC PCO2: 31.1 MM HG (ref 35–48)
POC PH TEMP: ABNORMAL
POC PH: 7.52 (ref 7.35–7.45)
POC PO2 TEMP: ABNORMAL MM HG
POC PO2: 64.3 MM HG (ref 83–108)
POSITIVE BASE EXCESS, ART: 3 (ref 0–3)
POTASSIUM SERPL-SCNC: 3.8 MMOL/L (ref 3.7–5.3)
RBC # BLD: 3.18 M/UL (ref 3.95–5.11)
RBC # BLD: ABNORMAL 10*6/UL
SAMPLE SITE: ABNORMAL
SEG NEUTROPHILS: 83 % (ref 36–66)
SEGMENTED NEUTROPHILS ABSOLUTE COUNT: 11.28 K/UL (ref 1.8–7.7)
SODIUM BLD-SCNC: 133 MMOL/L (ref 135–144)
TCO2 (CALC), ART: 26 MMOL/L (ref 22–29)
TIME, STOOL #1: ABNORMAL
TIME, STOOL #2: ABNORMAL
TIME, STOOL #3: ABNORMAL
WBC # BLD: 13.6 K/UL (ref 3.5–11.3)
WBC # BLD: ABNORMAL 10*3/UL

## 2020-03-08 PROCEDURE — 2580000003 HC RX 258: Performed by: STUDENT IN AN ORGANIZED HEALTH CARE EDUCATION/TRAINING PROGRAM

## 2020-03-08 PROCEDURE — 36415 COLL VENOUS BLD VENIPUNCTURE: CPT

## 2020-03-08 PROCEDURE — 2000000000 HC ICU R&B

## 2020-03-08 PROCEDURE — 2700000000 HC OXYGEN THERAPY PER DAY

## 2020-03-08 PROCEDURE — G0328 FECAL BLOOD SCRN IMMUNOASSAY: HCPCS

## 2020-03-08 PROCEDURE — 99291 CRITICAL CARE FIRST HOUR: CPT | Performed by: INTERNAL MEDICINE

## 2020-03-08 PROCEDURE — 84100 ASSAY OF PHOSPHORUS: CPT

## 2020-03-08 PROCEDURE — 94640 AIRWAY INHALATION TREATMENT: CPT

## 2020-03-08 PROCEDURE — C9113 INJ PANTOPRAZOLE SODIUM, VIA: HCPCS | Performed by: STUDENT IN AN ORGANIZED HEALTH CARE EDUCATION/TRAINING PROGRAM

## 2020-03-08 PROCEDURE — 2500000003 HC RX 250 WO HCPCS: Performed by: STUDENT IN AN ORGANIZED HEALTH CARE EDUCATION/TRAINING PROGRAM

## 2020-03-08 PROCEDURE — 83735 ASSAY OF MAGNESIUM: CPT

## 2020-03-08 PROCEDURE — 6370000000 HC RX 637 (ALT 250 FOR IP): Performed by: STUDENT IN AN ORGANIZED HEALTH CARE EDUCATION/TRAINING PROGRAM

## 2020-03-08 PROCEDURE — 94761 N-INVAS EAR/PLS OXIMETRY MLT: CPT

## 2020-03-08 PROCEDURE — 6360000002 HC RX W HCPCS: Performed by: STUDENT IN AN ORGANIZED HEALTH CARE EDUCATION/TRAINING PROGRAM

## 2020-03-08 PROCEDURE — 82947 ASSAY GLUCOSE BLOOD QUANT: CPT

## 2020-03-08 PROCEDURE — 82803 BLOOD GASES ANY COMBINATION: CPT

## 2020-03-08 PROCEDURE — 36600 WITHDRAWAL OF ARTERIAL BLOOD: CPT

## 2020-03-08 PROCEDURE — 85025 COMPLETE CBC W/AUTO DIFF WBC: CPT

## 2020-03-08 PROCEDURE — 80048 BASIC METABOLIC PNL TOTAL CA: CPT

## 2020-03-08 RX ORDER — OLANZAPINE 10 MG/1
5 INJECTION, POWDER, LYOPHILIZED, FOR SOLUTION INTRAMUSCULAR ONCE
Status: DISCONTINUED | OUTPATIENT
Start: 2020-03-08 | End: 2020-03-08

## 2020-03-08 RX ORDER — MIDAZOLAM HYDROCHLORIDE 1 MG/ML
0.5 INJECTION INTRAMUSCULAR; INTRAVENOUS ONCE
Status: COMPLETED | OUTPATIENT
Start: 2020-03-08 | End: 2020-03-08

## 2020-03-08 RX ORDER — PREDNISONE 20 MG/1
40 TABLET ORAL DAILY
Status: DISCONTINUED | OUTPATIENT
Start: 2020-03-08 | End: 2020-03-09

## 2020-03-08 RX ORDER — QUETIAPINE FUMARATE 25 MG/1
25 TABLET, FILM COATED ORAL NIGHTLY PRN
Status: DISCONTINUED | OUTPATIENT
Start: 2020-03-08 | End: 2020-03-09

## 2020-03-08 RX ORDER — MIDAZOLAM HYDROCHLORIDE 1 MG/ML
INJECTION INTRAMUSCULAR; INTRAVENOUS
Status: DISPENSED
Start: 2020-03-08 | End: 2020-03-08

## 2020-03-08 RX ORDER — DIPHENHYDRAMINE HYDROCHLORIDE 50 MG/ML
25 INJECTION INTRAMUSCULAR; INTRAVENOUS ONCE
Status: COMPLETED | OUTPATIENT
Start: 2020-03-08 | End: 2020-03-08

## 2020-03-08 RX ORDER — HALOPERIDOL 5 MG/ML
INJECTION INTRAMUSCULAR
Status: DISPENSED
Start: 2020-03-08 | End: 2020-03-08

## 2020-03-08 RX ORDER — QUETIAPINE FUMARATE 25 MG/1
25 TABLET, FILM COATED ORAL ONCE
Status: COMPLETED | OUTPATIENT
Start: 2020-03-08 | End: 2020-03-09

## 2020-03-08 RX ORDER — QUETIAPINE FUMARATE 25 MG/1
25 TABLET, FILM COATED ORAL 2 TIMES DAILY
Status: DISCONTINUED | OUTPATIENT
Start: 2020-03-08 | End: 2020-03-08

## 2020-03-08 RX ORDER — LORAZEPAM 2 MG/ML
1 INJECTION INTRAMUSCULAR ONCE
Status: COMPLETED | OUTPATIENT
Start: 2020-03-08 | End: 2020-03-08

## 2020-03-08 RX ORDER — ZIPRASIDONE MESYLATE 20 MG/ML
20 INJECTION, POWDER, LYOPHILIZED, FOR SOLUTION INTRAMUSCULAR ONCE
Status: COMPLETED | OUTPATIENT
Start: 2020-03-08 | End: 2020-03-08

## 2020-03-08 RX ORDER — OLANZAPINE 10 MG/1
5 INJECTION, POWDER, LYOPHILIZED, FOR SOLUTION INTRAMUSCULAR ONCE
Status: COMPLETED | OUTPATIENT
Start: 2020-03-08 | End: 2020-03-08

## 2020-03-08 RX ORDER — HALOPERIDOL 5 MG/ML
10 INJECTION INTRAMUSCULAR ONCE
Status: COMPLETED | OUTPATIENT
Start: 2020-03-08 | End: 2020-03-08

## 2020-03-08 RX ADMIN — WATER 2.1 ML: 1 INJECTION INTRAMUSCULAR; INTRAVENOUS; SUBCUTANEOUS at 01:37

## 2020-03-08 RX ADMIN — CLONAZEPAM 0.5 MG: 0.5 TABLET ORAL at 09:45

## 2020-03-08 RX ADMIN — AMITRIPTYLINE HYDROCHLORIDE 10 MG: 10 TABLET, FILM COATED ORAL at 08:22

## 2020-03-08 RX ADMIN — IPRATROPIUM BROMIDE AND ALBUTEROL SULFATE 1 AMPULE: .5; 3 SOLUTION RESPIRATORY (INHALATION) at 20:11

## 2020-03-08 RX ADMIN — ESCITALOPRAM OXALATE 20 MG: 10 TABLET ORAL at 08:22

## 2020-03-08 RX ADMIN — DEXMEDETOMIDINE HYDROCHLORIDE 1.4 MCG/KG/HR: 100 INJECTION, SOLUTION INTRAVENOUS at 03:25

## 2020-03-08 RX ADMIN — AMITRIPTYLINE HYDROCHLORIDE 10 MG: 10 TABLET, FILM COATED ORAL at 13:43

## 2020-03-08 RX ADMIN — BUSPIRONE HYDROCHLORIDE 20 MG: 10 TABLET ORAL at 13:43

## 2020-03-08 RX ADMIN — CLONAZEPAM 0.5 MG: 0.5 TABLET ORAL at 20:41

## 2020-03-08 RX ADMIN — IPRATROPIUM BROMIDE AND ALBUTEROL SULFATE 1 AMPULE: .5; 3 SOLUTION RESPIRATORY (INHALATION) at 11:16

## 2020-03-08 RX ADMIN — GABAPENTIN 300 MG: 300 CAPSULE ORAL at 08:22

## 2020-03-08 RX ADMIN — IPRATROPIUM BROMIDE AND ALBUTEROL SULFATE 1 AMPULE: .5; 3 SOLUTION RESPIRATORY (INHALATION) at 15:40

## 2020-03-08 RX ADMIN — ONDANSETRON 4 MG: 2 INJECTION INTRAMUSCULAR; INTRAVENOUS at 19:40

## 2020-03-08 RX ADMIN — SODIUM CHLORIDE, PRESERVATIVE FREE 10 ML: 5 INJECTION INTRAVENOUS at 20:33

## 2020-03-08 RX ADMIN — PANTOPRAZOLE SODIUM 40 MG: 40 INJECTION, POWDER, FOR SOLUTION INTRAVENOUS at 20:28

## 2020-03-08 RX ADMIN — LORAZEPAM 1 MG: 2 INJECTION INTRAMUSCULAR; INTRAVENOUS at 10:56

## 2020-03-08 RX ADMIN — METHYLPREDNISOLONE SODIUM SUCCINATE 40 MG: 40 INJECTION, POWDER, FOR SOLUTION INTRAMUSCULAR; INTRAVENOUS at 03:55

## 2020-03-08 RX ADMIN — WATER 1.2 ML: 1 INJECTION INTRAMUSCULAR; INTRAVENOUS; SUBCUTANEOUS at 11:54

## 2020-03-08 RX ADMIN — FENTANYL CITRATE 50 MCG: 50 INJECTION, SOLUTION INTRAMUSCULAR; INTRAVENOUS at 21:38

## 2020-03-08 RX ADMIN — MIDAZOLAM HYDROCHLORIDE 0.5 MG: 1 INJECTION, SOLUTION INTRAMUSCULAR; INTRAVENOUS at 00:11

## 2020-03-08 RX ADMIN — PANTOPRAZOLE SODIUM 40 MG: 40 INJECTION, POWDER, FOR SOLUTION INTRAVENOUS at 08:23

## 2020-03-08 RX ADMIN — METOPROLOL SUCCINATE 25 MG: 25 TABLET, FILM COATED, EXTENDED RELEASE ORAL at 20:27

## 2020-03-08 RX ADMIN — METHYLPREDNISOLONE SODIUM SUCCINATE 40 MG: 40 INJECTION, POWDER, FOR SOLUTION INTRAMUSCULAR; INTRAVENOUS at 09:45

## 2020-03-08 RX ADMIN — OLANZAPINE 5 MG: 10 INJECTION, POWDER, LYOPHILIZED, FOR SOLUTION INTRAMUSCULAR at 01:36

## 2020-03-08 RX ADMIN — DEXMEDETOMIDINE HYDROCHLORIDE 1.4 MCG/KG/HR: 100 INJECTION, SOLUTION INTRAVENOUS at 07:03

## 2020-03-08 RX ADMIN — ONDANSETRON 4 MG: 2 INJECTION INTRAMUSCULAR; INTRAVENOUS at 15:38

## 2020-03-08 RX ADMIN — DEXMEDETOMIDINE HYDROCHLORIDE 0.8 MCG/KG/HR: 100 INJECTION, SOLUTION INTRAVENOUS at 10:45

## 2020-03-08 RX ADMIN — BUSPIRONE HYDROCHLORIDE 20 MG: 10 TABLET ORAL at 08:22

## 2020-03-08 RX ADMIN — I.V. FAT EMULSION 100 ML: 20 EMULSION INTRAVENOUS at 18:11

## 2020-03-08 RX ADMIN — PREDNISONE 40 MG: 20 TABLET ORAL at 12:11

## 2020-03-08 RX ADMIN — ACETAMINOPHEN 650 MG: 650 SUPPOSITORY RECTAL at 15:42

## 2020-03-08 RX ADMIN — IPRATROPIUM BROMIDE AND ALBUTEROL SULFATE 1 AMPULE: .5; 3 SOLUTION RESPIRATORY (INHALATION) at 07:22

## 2020-03-08 RX ADMIN — BUSPIRONE HYDROCHLORIDE 20 MG: 10 TABLET ORAL at 20:29

## 2020-03-08 RX ADMIN — SODIUM CHLORIDE, POTASSIUM CHLORIDE, SODIUM LACTATE AND CALCIUM CHLORIDE: 600; 310; 30; 20 INJECTION, SOLUTION INTRAVENOUS at 15:32

## 2020-03-08 RX ADMIN — ENOXAPARIN SODIUM 40 MG: 40 INJECTION SUBCUTANEOUS at 08:22

## 2020-03-08 RX ADMIN — FENTANYL CITRATE 50 MCG: 50 INJECTION, SOLUTION INTRAMUSCULAR; INTRAVENOUS at 03:21

## 2020-03-08 RX ADMIN — ZIPRASIDONE MESYLATE 20 MG: 20 INJECTION, POWDER, LYOPHILIZED, FOR SOLUTION INTRAMUSCULAR at 11:52

## 2020-03-08 RX ADMIN — TRAZODONE HYDROCHLORIDE 100 MG: 100 TABLET ORAL at 20:36

## 2020-03-08 RX ADMIN — QUETIAPINE FUMARATE 25 MG: 25 TABLET ORAL at 20:36

## 2020-03-08 RX ADMIN — POTASSIUM CHLORIDE: 2 INJECTION, SOLUTION, CONCENTRATE INTRAVENOUS at 18:10

## 2020-03-08 RX ADMIN — HALOPERIDOL LACTATE 10 MG: 5 INJECTION, SOLUTION INTRAMUSCULAR at 10:56

## 2020-03-08 RX ADMIN — Medication 10 ML: at 08:23

## 2020-03-08 RX ADMIN — GABAPENTIN 300 MG: 300 CAPSULE ORAL at 13:43

## 2020-03-08 RX ADMIN — DIPHENHYDRAMINE HYDROCHLORIDE 25 MG: 50 INJECTION, SOLUTION INTRAMUSCULAR; INTRAVENOUS at 10:56

## 2020-03-08 RX ADMIN — CLINDAMYCIN IN 5 PERCENT DEXTROSE 600 MG: 12 INJECTION, SOLUTION INTRAVENOUS at 04:58

## 2020-03-08 RX ADMIN — AMITRIPTYLINE HYDROCHLORIDE 10 MG: 10 TABLET, FILM COATED ORAL at 20:29

## 2020-03-08 RX ADMIN — FENTANYL CITRATE 50 MCG: 50 INJECTION, SOLUTION INTRAMUSCULAR; INTRAVENOUS at 06:15

## 2020-03-08 RX ADMIN — FENTANYL CITRATE 50 MCG: 50 INJECTION, SOLUTION INTRAMUSCULAR; INTRAVENOUS at 23:28

## 2020-03-08 ASSESSMENT — PAIN SCALES - GENERAL
PAINLEVEL_OUTOF10: 0
PAINLEVEL_OUTOF10: 2
PAINLEVEL_OUTOF10: 2
PAINLEVEL_OUTOF10: 0

## 2020-03-08 NOTE — PROGRESS NOTES
mg, Q6H PRN      SUPPORT DEVICES: [x] Ventilator [] BIPAP  [] Nasal Cannula [] Room Air    VENT SETTINGS (Comprehensive) (if applicable):  Vent Information  $Ventilation: $Subsequent Day  Ventilator Stopped: Yes  Skin Assessment: Clean, dry, & intact  Equipment Changed: Expiratory Filter  Vent Type: Servo i  Vent Mode: PRVC  Vt Ordered: 420 mL  Pressure Ordered: 8  Rate Set: 20 bmp  Pressure Support: 8 cmH20  FiO2 : 75 %  Sensitivity: 5  PEEP/CPAP: 12  I Time/ I Time %: 0.7 s  Humidification Source: Heated wire  Humidification Temp: 31  Humidification Temp Measured: 31  Circuit Condensation: Drained  Additional Respiratory  Assessments  Pulse: 73  Resp: (!) 37  SpO2: 93 %  End Tidal CO2: 33 (%)  Position: Shelton's  Humidification Source: Heated wire  Humidification Temp: 31  Circuit Condensation: Drained  Oral Care Completed?: Yes  Oral Care: Mouth swabbed  Subglottic Suction Done?: Yes  Skin barrier applied: No    ABGs:   Lab Results   Component Value Date    CWN2ZAM 26 03/08/2020    FIO2 75.0 03/08/2020     Lactic Acid:   Lab Results   Component Value Date    LACTA 4.1 04/30/2018     DATA:  Complete Blood Count:   Recent Labs     03/07/20  0501 03/08/20  0512   WBC 11.1 13.6*   HGB 9.3* 10.2*   .7 98.4    381   RBC 2.95* 3.18*   HCT 30.0* 31.3*   MCH 31.5 32.1   MCHC 31.0 32.6   RDW 14.7* 14.6*   MPV 12.3 12.1      PT/INR:    Lab Results   Component Value Date    PROTIME 11.0 02/10/2020    INR 1.0 02/10/2020     Basal Metabolic Profile:   Recent Labs     03/06/20  0507 03/07/20  0501 03/08/20  0512    136 133*   K 4.0 4.4 3.8   BUN 15 16 18   CREATININE 0.52 0.42* 0.42*    102 101   CO2 23 22 21      Magnesium:   Lab Results   Component Value Date    MG 2.0 03/08/2020    MG 2.0 03/07/2020    MG 1.9 03/06/2020     Phosphorus:   Lab Results   Component Value Date    PHOS 3.5 03/08/2020    PHOS 3.2 03/07/2020    PHOS 2.2 03/06/2020     S.  Calcium:  Recent Labs     03/08/20  0512   CALCIUM

## 2020-03-08 NOTE — PLAN OF CARE
improve  Outcome: Ongoing     Problem: Pain:  Goal: Recognizes and communicates pain  Description: Recognizes and communicates pain  Outcome: Ongoing  Goal: Control of acute pain  Description: Control of acute pain  Outcome: Ongoing  Goal: Control of chronic pain  Description: Control of chronic pain  Outcome: Ongoing     Problem: Skin Integrity - Impaired:  Goal: Will show no infection signs and symptoms  Description: Will show no infection signs and symptoms  Outcome: Ongoing  Goal: Absence of new skin breakdown  Description: Absence of new skin breakdown  Outcome: Ongoing     Problem: Sleep Pattern Disturbance:  Goal: Appears well-rested  Description: Appears well-rested  Outcome: Ongoing     Problem: Risk for Impaired Skin Integrity  Goal: Tissue integrity - skin and mucous membranes  Description: Structural intactness and normal physiological function of skin and  mucous membranes.   Outcome: Ongoing     Problem: Falls - Risk of:  Goal: Will remain free from falls  Description: Will remain free from falls  Outcome: Ongoing  Goal: Absence of physical injury  Description: Absence of physical injury  Outcome: Ongoing     Problem: OXYGENATION/RESPIRATORY FUNCTION  Goal: Patient will maintain patent airway  Outcome: Ongoing  Goal: Patient will achieve/maintain normal respiratory rate/effort  Description: Respiratory rate and effort will be within normal limits for the patient  Outcome: Ongoing     Problem: Restraint Use - Nonviolent/Non-Self-Destructive Behavior:  Goal: Absence of restraint indications  Description: Absence of restraint indications  Outcome: Ongoing  Goal: Absence of restraint-related injury  Description: Absence of restraint-related injury  Outcome: Ongoing     Problem: Nutrition  Goal: Optimal nutrition therapy  3/8/2020 1637 by Rob Aggarwal RN  Outcome: Ongoing  3/8/2020 1238 by Anand Chester RD, LD  Outcome: Ongoing  Note: Nutrition Problem: Inadequate oral intake  Intervention: Food and/or Nutrient Delivery: Continue NPO, Continue Parenteral Nutrition  Nutritional Goals: meet % of estimated nutrition needs       Problem: Musculor/Skeletal Functional Status  Goal: Highest potential functional level  Outcome: Ongoing

## 2020-03-08 NOTE — PROGRESS NOTES
Patient have episodes of emesis. Emesis appears to be dark in color. Critical care resident notified of occurences.

## 2020-03-09 ENCOUNTER — TELEPHONE (OUTPATIENT)
Dept: BARIATRICS/WEIGHT MGMT | Age: 75
End: 2020-03-09

## 2020-03-09 LAB
ABSOLUTE EOS #: 0 K/UL (ref 0–0.44)
ABSOLUTE IMMATURE GRANULOCYTE: 1.19 K/UL (ref 0–0.3)
ABSOLUTE LYMPH #: 0.85 K/UL (ref 1.1–3.7)
ABSOLUTE MONO #: 0.85 K/UL (ref 0.1–1.2)
ANION GAP SERPL CALCULATED.3IONS-SCNC: 10 MMOL/L (ref 9–17)
BASOPHILS # BLD: 1 % (ref 0–2)
BASOPHILS ABSOLUTE: 0.17 K/UL (ref 0–0.2)
BUN BLDV-MCNC: 27 MG/DL (ref 8–23)
BUN/CREAT BLD: ABNORMAL (ref 9–20)
CALCIUM SERPL-MCNC: 8.4 MG/DL (ref 8.6–10.4)
CHLORIDE BLD-SCNC: 102 MMOL/L (ref 98–107)
CO2: 22 MMOL/L (ref 20–31)
CREAT SERPL-MCNC: 0.54 MG/DL (ref 0.5–0.9)
DIFFERENTIAL TYPE: ABNORMAL
EKG ATRIAL RATE: 74 BPM
EKG P AXIS: 49 DEGREES
EKG P-R INTERVAL: 150 MS
EKG Q-T INTERVAL: 380 MS
EKG QRS DURATION: 70 MS
EKG QTC CALCULATION (BAZETT): 421 MS
EKG R AXIS: 58 DEGREES
EKG T AXIS: 31 DEGREES
EKG VENTRICULAR RATE: 74 BPM
EOSINOPHILS RELATIVE PERCENT: 0 % (ref 1–4)
GFR AFRICAN AMERICAN: >60 ML/MIN
GFR NON-AFRICAN AMERICAN: >60 ML/MIN
GFR SERPL CREATININE-BSD FRML MDRD: ABNORMAL ML/MIN/{1.73_M2}
GFR SERPL CREATININE-BSD FRML MDRD: ABNORMAL ML/MIN/{1.73_M2}
GLUCOSE BLD-MCNC: 135 MG/DL (ref 70–99)
HCT VFR BLD CALC: 33 % (ref 36.3–47.1)
HEMOGLOBIN: 10.3 G/DL (ref 11.9–15.1)
IMMATURE GRANULOCYTES: 7 %
LYMPHOCYTES # BLD: 5 % (ref 24–43)
MAGNESIUM: 2 MG/DL (ref 1.6–2.6)
MCH RBC QN AUTO: 30.9 PG (ref 25.2–33.5)
MCHC RBC AUTO-ENTMCNC: 31.2 G/DL (ref 28.4–34.8)
MCV RBC AUTO: 99.1 FL (ref 82.6–102.9)
MONOCYTES # BLD: 5 % (ref 3–12)
MORPHOLOGY: ABNORMAL
NRBC AUTOMATED: 0.1 PER 100 WBC
PDW BLD-RTO: 14.6 % (ref 11.8–14.4)
PHOSPHORUS: 3.6 MG/DL (ref 2.6–4.5)
PLATELET # BLD: 319 K/UL (ref 138–453)
PLATELET ESTIMATE: ABNORMAL
PMV BLD AUTO: 11.9 FL (ref 8.1–13.5)
POTASSIUM SERPL-SCNC: 4 MMOL/L (ref 3.7–5.3)
RBC # BLD: 3.33 M/UL (ref 3.95–5.11)
RBC # BLD: ABNORMAL 10*6/UL
SEG NEUTROPHILS: 82 % (ref 36–65)
SEGMENTED NEUTROPHILS ABSOLUTE COUNT: 13.94 K/UL (ref 1.5–8.1)
SODIUM BLD-SCNC: 134 MMOL/L (ref 135–144)
WBC # BLD: 17 K/UL (ref 3.5–11.3)
WBC # BLD: ABNORMAL 10*3/UL

## 2020-03-09 PROCEDURE — 6360000002 HC RX W HCPCS: Performed by: STUDENT IN AN ORGANIZED HEALTH CARE EDUCATION/TRAINING PROGRAM

## 2020-03-09 PROCEDURE — 2580000003 HC RX 258: Performed by: STUDENT IN AN ORGANIZED HEALTH CARE EDUCATION/TRAINING PROGRAM

## 2020-03-09 PROCEDURE — 83735 ASSAY OF MAGNESIUM: CPT

## 2020-03-09 PROCEDURE — 6370000000 HC RX 637 (ALT 250 FOR IP): Performed by: STUDENT IN AN ORGANIZED HEALTH CARE EDUCATION/TRAINING PROGRAM

## 2020-03-09 PROCEDURE — 80048 BASIC METABOLIC PNL TOTAL CA: CPT

## 2020-03-09 PROCEDURE — 2700000000 HC OXYGEN THERAPY PER DAY

## 2020-03-09 PROCEDURE — 6370000000 HC RX 637 (ALT 250 FOR IP): Performed by: SURGERY

## 2020-03-09 PROCEDURE — 84100 ASSAY OF PHOSPHORUS: CPT

## 2020-03-09 PROCEDURE — 6360000002 HC RX W HCPCS

## 2020-03-09 PROCEDURE — 97530 THERAPEUTIC ACTIVITIES: CPT

## 2020-03-09 PROCEDURE — 93010 ELECTROCARDIOGRAM REPORT: CPT | Performed by: INTERNAL MEDICINE

## 2020-03-09 PROCEDURE — 94761 N-INVAS EAR/PLS OXIMETRY MLT: CPT

## 2020-03-09 PROCEDURE — 2500000003 HC RX 250 WO HCPCS: Performed by: STUDENT IN AN ORGANIZED HEALTH CARE EDUCATION/TRAINING PROGRAM

## 2020-03-09 PROCEDURE — 36415 COLL VENOUS BLD VENIPUNCTURE: CPT

## 2020-03-09 PROCEDURE — C9113 INJ PANTOPRAZOLE SODIUM, VIA: HCPCS | Performed by: STUDENT IN AN ORGANIZED HEALTH CARE EDUCATION/TRAINING PROGRAM

## 2020-03-09 PROCEDURE — 85025 COMPLETE CBC W/AUTO DIFF WBC: CPT

## 2020-03-09 PROCEDURE — 99291 CRITICAL CARE FIRST HOUR: CPT | Performed by: INTERNAL MEDICINE

## 2020-03-09 PROCEDURE — 94640 AIRWAY INHALATION TREATMENT: CPT

## 2020-03-09 PROCEDURE — 2000000000 HC ICU R&B

## 2020-03-09 RX ORDER — MIDAZOLAM HYDROCHLORIDE 1 MG/ML
INJECTION INTRAMUSCULAR; INTRAVENOUS
Status: COMPLETED
Start: 2020-03-09 | End: 2020-03-09

## 2020-03-09 RX ORDER — SCOLOPAMINE TRANSDERMAL SYSTEM 1 MG/1
1 PATCH, EXTENDED RELEASE TRANSDERMAL
Status: DISCONTINUED | OUTPATIENT
Start: 2020-03-09 | End: 2020-03-16

## 2020-03-09 RX ORDER — PROMETHAZINE HYDROCHLORIDE 25 MG/ML
12.5 INJECTION, SOLUTION INTRAMUSCULAR; INTRAVENOUS ONCE
Status: COMPLETED | OUTPATIENT
Start: 2020-03-09 | End: 2020-03-09

## 2020-03-09 RX ORDER — MIDAZOLAM HYDROCHLORIDE 1 MG/ML
2 INJECTION INTRAMUSCULAR; INTRAVENOUS
Status: DISCONTINUED | OUTPATIENT
Start: 2020-03-09 | End: 2020-03-09

## 2020-03-09 RX ORDER — PANTOPRAZOLE SODIUM 40 MG/1
40 TABLET, DELAYED RELEASE ORAL
Status: DISCONTINUED | OUTPATIENT
Start: 2020-03-09 | End: 2020-03-09

## 2020-03-09 RX ORDER — PROCHLORPERAZINE EDISYLATE 5 MG/ML
10 INJECTION INTRAMUSCULAR; INTRAVENOUS EVERY 6 HOURS PRN
Status: DISCONTINUED | OUTPATIENT
Start: 2020-03-09 | End: 2020-03-09

## 2020-03-09 RX ADMIN — ONDANSETRON 4 MG: 2 INJECTION INTRAMUSCULAR; INTRAVENOUS at 01:05

## 2020-03-09 RX ADMIN — FENTANYL CITRATE 50 MCG: 50 INJECTION, SOLUTION INTRAMUSCULAR; INTRAVENOUS at 13:45

## 2020-03-09 RX ADMIN — MIDAZOLAM HYDROCHLORIDE 2 MG: 1 INJECTION, SOLUTION INTRAMUSCULAR; INTRAVENOUS at 04:10

## 2020-03-09 RX ADMIN — Medication 10 ML: at 09:28

## 2020-03-09 RX ADMIN — ENOXAPARIN SODIUM 40 MG: 40 INJECTION SUBCUTANEOUS at 09:11

## 2020-03-09 RX ADMIN — ONDANSETRON 4 MG: 2 INJECTION INTRAMUSCULAR; INTRAVENOUS at 21:37

## 2020-03-09 RX ADMIN — CLONAZEPAM 0.5 MG: 0.5 TABLET ORAL at 08:37

## 2020-03-09 RX ADMIN — FENTANYL CITRATE 50 MCG: 50 INJECTION, SOLUTION INTRAMUSCULAR; INTRAVENOUS at 09:15

## 2020-03-09 RX ADMIN — GABAPENTIN 300 MG: 300 CAPSULE ORAL at 00:10

## 2020-03-09 RX ADMIN — IPRATROPIUM BROMIDE AND ALBUTEROL SULFATE 1 AMPULE: .5; 3 SOLUTION RESPIRATORY (INHALATION) at 11:55

## 2020-03-09 RX ADMIN — IPRATROPIUM BROMIDE AND ALBUTEROL SULFATE 1 AMPULE: .5; 3 SOLUTION RESPIRATORY (INHALATION) at 07:56

## 2020-03-09 RX ADMIN — ONDANSETRON 4 MG: 2 INJECTION INTRAMUSCULAR; INTRAVENOUS at 15:49

## 2020-03-09 RX ADMIN — MIDAZOLAM HYDROCHLORIDE 2 MG: 1 INJECTION, SOLUTION INTRAMUSCULAR; INTRAVENOUS at 02:17

## 2020-03-09 RX ADMIN — MIDAZOLAM HYDROCHLORIDE 2 MG: 1 INJECTION, SOLUTION INTRAMUSCULAR; INTRAVENOUS at 10:41

## 2020-03-09 RX ADMIN — QUETIAPINE FUMARATE 25 MG: 25 TABLET ORAL at 00:07

## 2020-03-09 RX ADMIN — SODIUM CHLORIDE 8 MG/HR: 9 INJECTION, SOLUTION INTRAVENOUS at 10:26

## 2020-03-09 RX ADMIN — PANTOPRAZOLE SODIUM 40 MG: 40 TABLET, DELAYED RELEASE ORAL at 08:34

## 2020-03-09 RX ADMIN — PROMETHAZINE HYDROCHLORIDE 12.5 MG: 25 INJECTION, SOLUTION INTRAMUSCULAR; INTRAVENOUS at 10:37

## 2020-03-09 RX ADMIN — IPRATROPIUM BROMIDE AND ALBUTEROL SULFATE 1 AMPULE: .5; 3 SOLUTION RESPIRATORY (INHALATION) at 15:55

## 2020-03-09 RX ADMIN — SODIUM CHLORIDE 80 MG: 9 INJECTION, SOLUTION INTRAVENOUS at 09:29

## 2020-03-09 RX ADMIN — I.V. FAT EMULSION 100 ML: 20 EMULSION INTRAVENOUS at 17:48

## 2020-03-09 RX ADMIN — SODIUM CHLORIDE 8 MG/HR: 9 INJECTION, SOLUTION INTRAVENOUS at 17:38

## 2020-03-09 RX ADMIN — DEXMEDETOMIDINE HYDROCHLORIDE 0.8 MCG/KG/HR: 100 INJECTION, SOLUTION INTRAVENOUS at 20:50

## 2020-03-09 RX ADMIN — DEXMEDETOMIDINE HYDROCHLORIDE 0.2 MCG/KG/HR: 100 INJECTION, SOLUTION INTRAVENOUS at 14:24

## 2020-03-09 RX ADMIN — FENTANYL CITRATE 50 MCG: 50 INJECTION, SOLUTION INTRAMUSCULAR; INTRAVENOUS at 01:47

## 2020-03-09 RX ADMIN — POTASSIUM CHLORIDE: 2 INJECTION, SOLUTION, CONCENTRATE INTRAVENOUS at 17:43

## 2020-03-09 RX ADMIN — IPRATROPIUM BROMIDE AND ALBUTEROL SULFATE 1 AMPULE: .5; 3 SOLUTION RESPIRATORY (INHALATION) at 19:14

## 2020-03-09 RX ADMIN — MIDAZOLAM HYDROCHLORIDE 2 MG: 1 INJECTION, SOLUTION INTRAMUSCULAR; INTRAVENOUS at 06:16

## 2020-03-09 ASSESSMENT — PAIN SCALES - GENERAL
PAINLEVEL_OUTOF10: 5
PAINLEVEL_OUTOF10: 9
PAINLEVEL_OUTOF10: 0
PAINLEVEL_OUTOF10: 7
PAINLEVEL_OUTOF10: 9
PAINLEVEL_OUTOF10: 9

## 2020-03-09 ASSESSMENT — PAIN - FUNCTIONAL ASSESSMENT: PAIN_FUNCTIONAL_ASSESSMENT: ACTIVITIES ARE NOT PREVENTED

## 2020-03-09 ASSESSMENT — PAIN DESCRIPTION - DESCRIPTORS: DESCRIPTORS: ACHING

## 2020-03-09 ASSESSMENT — PAIN DESCRIPTION - PAIN TYPE: TYPE: ACUTE PAIN

## 2020-03-09 ASSESSMENT — PAIN DESCRIPTION - LOCATION: LOCATION: ABDOMEN

## 2020-03-09 NOTE — PLAN OF CARE
Problem: OXYGENATION/RESPIRATORY FUNCTION  Goal: Patient will maintain patent airway  3/8/2020 2017 by Chasity eLmus RCP  Outcome: Ongoing     Problem: OXYGENATION/RESPIRATORY FUNCTION  Goal: Patient will achieve/maintain normal respiratory rate/effort  Description: Respiratory rate and effort will be within normal limits for the patient  3/8/2020 2017 by Chasity Lemus RCP  Outcome: Ongoing     Problem: RESPIRATORY  Intervention: Respiratory assessment  Note: BRONCHOSPASM/BRONCHOCONSTRICTION     [x]         IMPROVE AERATION/BREATH SOUNDS  [x]   ADMINISTER BRONCHODILATOR THERAPY AS APPROPRIATE  [x]   ASSESS BREATH SOUNDS  []   IMPLEMENT AEROSOL/MDI PROTOCOL  [x]   PATIENT EDUCATION AS NEEDED

## 2020-03-09 NOTE — PROGRESS NOTES
Critical Care Team - Daily Progress Note      Date and time: 3/9/2020 8:37 AM  Patient's name:  Geronimo Dillon  Medical Record Number: [id number]  Patient's account/billing number: [de-identified]  Patient's YOB: 1945  Age: 76 y.o. Date of Admission: 2/29/2020 10:37 AM  Length of stay during current admission: 9      Primary Care Physician: Vika Sierra MD  ICU Attending Physician: Dr. Rene Reese Status: Full Code    Reason for ICU admission: worsening shortness of breath    Subjective:     OVERNIGHT EVENTS:   Overnight patient had an episode of 100 temperature. Also she had 3-4 episodes of coffee-ground colored emesis. This morning she had 2-3 episodes of same coffee-ground colored emesis. She is also complaining of epigastric pain. Patient still has no bowel movement. She is still on heated high flow oxygen 60 FiO2 and 45 L. Leukocytosis worsened to 17 from 10 yesterday. Hemoglobin stable at 10.3. She is still getting anxious and nervous. She was given multiple doses of Haldol olanzapine and Benadryl but did not help much. Currently she is on Versed 2 IV push every 2 hours.     AWAKE & FOLLOWING COMMANDS:  [] No   [x] Yes    CURRENT VENTILATION STATUS:     [] Ventilator  [] BIPAP  [x] Nasal Cannula [] Room Air      SECRETIONS Amount:  [] Small [] Moderate  [] Large  [x] None  Color:     [] White [] Colored  [] Bloody    SEDATION:  RAAS Score:  [] Propofol gtt  [] Versed gtt  [] Ativan gtt   [x] No Sedation    PARALYZED:  [x] No    [] Yes    DIARRHEA:                [x] No                [] Yes  (C. Difficile status: [] positive                                                                                                                       [] negative                                                                                                                     [] pending)    VASOPRESSORS:  [x] No    [] Yes    If yes -   [] Levophed       [] Dopamine     [] Vasopressin       [] Date    LACTA 4.1 04/30/2018     DATA:  Complete Blood Count:   Recent Labs     03/07/20  0501 03/08/20  0512 03/09/20  0424   WBC 11.1 13.6* 17.0*   HGB 9.3* 10.2* 10.3*   .7 98.4 99.1    381 319   RBC 2.95* 3.18* 3.33*   HCT 30.0* 31.3* 33.0*   MCH 31.5 32.1 30.9   MCHC 31.0 32.6 31.2   RDW 14.7* 14.6* 14.6*   MPV 12.3 12.1 11.9        PT/INR:    Lab Results   Component Value Date    PROTIME 11.0 02/10/2020    INR 1.0 02/10/2020     PTT:  No results found for: APTT, PTT    Basal Metabolic Profile:   Recent Labs     03/07/20  0501 03/08/20  0512 03/09/20  0424    133* 134*   K 4.4 3.8 4.0   BUN 16 18 27*   CREATININE 0.42* 0.42* 0.54    101 102   CO2 22 21 22      Magnesium:   Lab Results   Component Value Date    MG 2.0 03/09/2020    MG 2.0 03/08/2020    MG 2.0 03/07/2020     Phosphorus:   Lab Results   Component Value Date    PHOS 3.6 03/09/2020    PHOS 3.5 03/08/2020    PHOS 3.2 03/07/2020     S. Calcium:  Recent Labs     03/09/20  0424   CALCIUM 8.4*     S. Ionized Calcium:No results for input(s): IONCA in the last 72 hours. Urinalysis:   Lab Results   Component Value Date    NITRU NEGATIVE 02/29/2020    COLORU YELLOW 02/29/2020    PHUR 6.0 02/29/2020    WBCUA None 02/29/2020    RBCUA 2 TO 5 02/29/2020    MUCUS NOT REPORTED 02/29/2020    TRICHOMONAS NOT REPORTED 02/29/2020    YEAST NOT REPORTED 02/29/2020    BACTERIA NOT REPORTED 02/29/2020    CLARITYU Clear 06/29/2018    SPECGRAV 1.059 02/29/2020    LEUKOCYTESUR NEGATIVE 02/29/2020    UROBILINOGEN Normal 02/29/2020    BILIRUBINUR NEGATIVE 02/29/2020    BILIRUBINUR Negative 06/29/2018    BLOODU Non-hemolyzed trace 06/29/2018    GLUCOSEU NEGATIVE 02/29/2020    KETUA NEGATIVE 02/29/2020    AMORPHOUS NOT REPORTED 02/29/2020       CARDIAC ENZYMES: No results for input(s): CKMB, CKMBINDEX, TROPONINI in the last 72 hours. Invalid input(s): CKTOTAL;3  BNP: No results for input(s): BNP in the last 72 hours.     LFTS  No results for

## 2020-03-09 NOTE — PROGRESS NOTES
[facility])  Occupational Therapy Not Seen Note    DATE: 3/9/2020  Name: Enma Gama  : 1945  MRN: [id number]    Patient not available for Occupational Therapy due to:    [] Testing:    [] Hemodialysis    [] Blood Transfusion in Progress    []Refusal by Patient:    [] Surgery/Procedure:    [] Strict Bedrest    [] Sedation    [] Spine Precautions     [] Pt being transferred to palliative care at this time. Spoke with pt/family and OT services to be defered. [] Pt independent with functional mobility and functional tasks.  Pt with no OT acute care needs at this time, will defer OT eval.    [x] Other: Per RN, pt with multiple episodes of nausea/vomiting this AM.     Next Scheduled Treatment: Will check back as able or 3/10/2020    Aurelia Silva OTR/L

## 2020-03-09 NOTE — PLAN OF CARE
Problem: Restraint Use - Nonviolent/Non-Self-Destructive Behavior:  Goal: Absence of restraint indications  Description: Absence of restraint indications  3/9/2020 0032 by Addison Amaya RN  Outcome: Ongoing  3/8/2020 1637 by Angela Cruz RN  Outcome: Ongoing  Goal: Absence of restraint-related injury  Description: Absence of restraint-related injury  3/9/2020 0032 by Addison Amaya RN  Outcome: Ongoing  3/8/2020 1637 by Angela Cruz RN  Outcome: Ongoing     Problem: Nutrition  Goal: Optimal nutrition therapy  3/9/2020 0032 by Addison Amaya RN  Outcome: Ongoing  3/8/2020 1637 by Angela Cruz RN  Outcome: Ongoing  3/8/2020 1238 by Anuel Roberto RD, LD  Outcome: Ongoing  Note: Nutrition Problem: Inadequate oral intake  Intervention: Food and/or Nutrient Delivery: Continue NPO, Continue Parenteral Nutrition  Nutritional Goals: meet % of estimated nutrition needs       Problem: Musculor/Skeletal Functional Status  Goal: Highest potential functional level  3/9/2020 0032 by Addison Amaya RN  Outcome: Ongoing  3/8/2020 1637 by Angela Cruz RN  Outcome: Ongoing

## 2020-03-09 NOTE — PLAN OF CARE
Problem: RESPIRATORY  Intervention: Respiratory assessment  Note: BRONCHOSPASM/BRONCHOCONSTRICTION     [x]         IMPROVE AERATION/BREATH SOUNDS  [x]   ADMINISTER BRONCHODILATOR THERAPY AS APPROPRIATE  [x]   ASSESS BREATH SOUNDS  []   IMPLEMENT AEROSOL/MDI PROTOCOL  [x]   PATIENT EDUCATION AS NEEDED

## 2020-03-09 NOTE — PROGRESS NOTES
session. Family / Caregiver Present: Yes(son)  Subjective  Subjective: Pt in bed; anxious; reporting abdominal pain. Agreeable to PT with encouragement  General Comment  Comments: Pt returned to bed after session with call light in reach  Pain Screening  Patient Currently in Pain: Yes  Pain Assessment  Pain Assessment: 0-10  Pain Level: 5  Pain Type: Acute pain  Pain Location: Abdomen  Pain Descriptors: Aching  Functional Pain Assessment: Activities are not prevented  Vital Signs  Patient Currently in Pain: Yes       Orientation  Orientation  Overall Orientation Status: Within Functional Limits  Cognition      Objective   Bed mobility  Supine to Sit: Moderate assistance(cues given for sequencing, cues to reach for hand rail)  Sit to Supine: Maximum assistance  Scooting: Moderate assistance           Balance  Posture: Fair  Sitting - Static: Fair;+(pt sat EOB for 15' with Michael/CGA; very anxious requiring constant redirection and cues for pursed lip breathing)      Goals  Short term goals  Time Frame for Short term goals: 14 visits  Short term goal 1: Pt to participate in AROM x4 extremities for gross strengthening  Short term goal 2: Pt to tolerate 30 minutes of therapy for endurance  Short term goal 3: Progress mobility as medically able  Patient Goals   Patient goals : None stated    Plan    Plan  Times per week: 2-3x  Current Treatment Recommendations: Strengthening, ROM, Functional Mobility Training, Home Exercise Program, Patient/Caregiver Education & Training, Positioning  Safety Devices  Type of devices:  All fall risk precautions in place, Call light within reach, Patient at risk for falls, Left in bed, Nurse notified  Restraints  Initially in place: No  Restraints: bilateral soft wrist restraints in place upon arrival and donned again prior to exit     Therapy Time   Individual Concurrent Group Co-treatment   Time In 0243         Time Out 0313         Minutes 30         Timed Code Treatment Minutes: 30

## 2020-03-10 ENCOUNTER — APPOINTMENT (OUTPATIENT)
Dept: GENERAL RADIOLOGY | Age: 75
DRG: 004 | End: 2020-03-10
Attending: INTERNAL MEDICINE
Payer: MEDICARE

## 2020-03-10 LAB
ABSOLUTE EOS #: 0.2 K/UL (ref 0–0.4)
ABSOLUTE IMMATURE GRANULOCYTE: 0 K/UL (ref 0–0.3)
ABSOLUTE LYMPH #: 1.58 K/UL (ref 1–4.8)
ABSOLUTE MONO #: 1.19 K/UL (ref 0.1–0.8)
BASOPHILS # BLD: 0 % (ref 0–2)
BASOPHILS ABSOLUTE: 0 K/UL (ref 0–0.2)
DIFFERENTIAL TYPE: ABNORMAL
EOSINOPHILS RELATIVE PERCENT: 1 % (ref 1–4)
HCT VFR BLD CALC: 31.6 % (ref 36.3–47.1)
HEMOGLOBIN: 9.8 G/DL (ref 11.9–15.1)
IMMATURE GRANULOCYTES: 0 %
LYMPHOCYTES # BLD: 8 % (ref 24–44)
MCH RBC QN AUTO: 31.1 PG (ref 25.2–33.5)
MCHC RBC AUTO-ENTMCNC: 31 G/DL (ref 28.4–34.8)
MCV RBC AUTO: 100.3 FL (ref 82.6–102.9)
MONOCYTES # BLD: 6 % (ref 1–7)
MORPHOLOGY: ABNORMAL
NRBC AUTOMATED: 0.1 PER 100 WBC
PDW BLD-RTO: 14.8 % (ref 11.8–14.4)
PLATELET # BLD: 291 K/UL (ref 138–453)
PLATELET ESTIMATE: ABNORMAL
PMV BLD AUTO: 11.9 FL (ref 8.1–13.5)
RBC # BLD: 3.15 M/UL (ref 3.95–5.11)
RBC # BLD: ABNORMAL 10*6/UL
SEG NEUTROPHILS: 85 % (ref 36–66)
SEGMENTED NEUTROPHILS ABSOLUTE COUNT: 16.83 K/UL (ref 1.8–7.7)
WBC # BLD: 19.8 K/UL (ref 3.5–11.3)
WBC # BLD: ABNORMAL 10*3/UL

## 2020-03-10 PROCEDURE — 6370000000 HC RX 637 (ALT 250 FOR IP): Performed by: STUDENT IN AN ORGANIZED HEALTH CARE EDUCATION/TRAINING PROGRAM

## 2020-03-10 PROCEDURE — 97164 PT RE-EVAL EST PLAN CARE: CPT

## 2020-03-10 PROCEDURE — 2000000000 HC ICU R&B

## 2020-03-10 PROCEDURE — 2580000003 HC RX 258: Performed by: STUDENT IN AN ORGANIZED HEALTH CARE EDUCATION/TRAINING PROGRAM

## 2020-03-10 PROCEDURE — 97530 THERAPEUTIC ACTIVITIES: CPT

## 2020-03-10 PROCEDURE — 94761 N-INVAS EAR/PLS OXIMETRY MLT: CPT

## 2020-03-10 PROCEDURE — C9113 INJ PANTOPRAZOLE SODIUM, VIA: HCPCS | Performed by: STUDENT IN AN ORGANIZED HEALTH CARE EDUCATION/TRAINING PROGRAM

## 2020-03-10 PROCEDURE — 71045 X-RAY EXAM CHEST 1 VIEW: CPT

## 2020-03-10 PROCEDURE — 94640 AIRWAY INHALATION TREATMENT: CPT

## 2020-03-10 PROCEDURE — 6360000002 HC RX W HCPCS: Performed by: STUDENT IN AN ORGANIZED HEALTH CARE EDUCATION/TRAINING PROGRAM

## 2020-03-10 PROCEDURE — 99024 POSTOP FOLLOW-UP VISIT: CPT | Performed by: SURGERY

## 2020-03-10 PROCEDURE — 2700000000 HC OXYGEN THERAPY PER DAY

## 2020-03-10 PROCEDURE — 36415 COLL VENOUS BLD VENIPUNCTURE: CPT

## 2020-03-10 PROCEDURE — 85025 COMPLETE CBC W/AUTO DIFF WBC: CPT

## 2020-03-10 PROCEDURE — 2500000003 HC RX 250 WO HCPCS: Performed by: STUDENT IN AN ORGANIZED HEALTH CARE EDUCATION/TRAINING PROGRAM

## 2020-03-10 PROCEDURE — 99291 CRITICAL CARE FIRST HOUR: CPT | Performed by: INTERNAL MEDICINE

## 2020-03-10 RX ORDER — METOCLOPRAMIDE HYDROCHLORIDE 5 MG/ML
10 INJECTION INTRAMUSCULAR; INTRAVENOUS EVERY 6 HOURS
Status: DISCONTINUED | OUTPATIENT
Start: 2020-03-10 | End: 2020-04-06 | Stop reason: HOSPADM

## 2020-03-10 RX ORDER — ESCITALOPRAM OXALATE 20 MG/1
20 TABLET ORAL DAILY
Status: ON HOLD | COMMUNITY
End: 2020-04-06 | Stop reason: HOSPADM

## 2020-03-10 RX ORDER — SODIUM CHLORIDE 9 MG/ML
10 INJECTION INTRAVENOUS DAILY
Status: DISCONTINUED | OUTPATIENT
Start: 2020-03-10 | End: 2020-03-10

## 2020-03-10 RX ORDER — TRAZODONE HYDROCHLORIDE 100 MG/1
100 TABLET ORAL NIGHTLY PRN
Status: ON HOLD | COMMUNITY
End: 2020-04-06 | Stop reason: HOSPADM

## 2020-03-10 RX ORDER — SODIUM CHLORIDE 9 MG/ML
10 INJECTION INTRAVENOUS 2 TIMES DAILY
Status: DISCONTINUED | OUTPATIENT
Start: 2020-03-11 | End: 2020-03-14

## 2020-03-10 RX ORDER — PANTOPRAZOLE SODIUM 40 MG/10ML
40 INJECTION, POWDER, LYOPHILIZED, FOR SOLUTION INTRAVENOUS DAILY
Status: DISCONTINUED | OUTPATIENT
Start: 2020-03-10 | End: 2020-03-10

## 2020-03-10 RX ORDER — PANTOPRAZOLE SODIUM 40 MG/10ML
40 INJECTION, POWDER, LYOPHILIZED, FOR SOLUTION INTRAVENOUS 2 TIMES DAILY
Status: DISCONTINUED | OUTPATIENT
Start: 2020-03-11 | End: 2020-03-14

## 2020-03-10 RX ORDER — BUSPIRONE HYDROCHLORIDE 10 MG/1
20 TABLET ORAL 3 TIMES DAILY
Status: ON HOLD | COMMUNITY
End: 2020-04-06 | Stop reason: HOSPADM

## 2020-03-10 RX ADMIN — IPRATROPIUM BROMIDE AND ALBUTEROL SULFATE 1 AMPULE: .5; 3 SOLUTION RESPIRATORY (INHALATION) at 07:12

## 2020-03-10 RX ADMIN — DEXMEDETOMIDINE HYDROCHLORIDE 0.7 MCG/KG/HR: 100 INJECTION, SOLUTION INTRAVENOUS at 08:54

## 2020-03-10 RX ADMIN — GABAPENTIN 300 MG: 300 CAPSULE ORAL at 20:36

## 2020-03-10 RX ADMIN — SODIUM CHLORIDE 8 MG/HR: 9 INJECTION, SOLUTION INTRAVENOUS at 04:00

## 2020-03-10 RX ADMIN — DEXMEDETOMIDINE HYDROCHLORIDE 0.7 MCG/KG/HR: 100 INJECTION, SOLUTION INTRAVENOUS at 15:59

## 2020-03-10 RX ADMIN — BUSPIRONE HYDROCHLORIDE 20 MG: 10 TABLET ORAL at 13:41

## 2020-03-10 RX ADMIN — POTASSIUM CHLORIDE: 2 INJECTION, SOLUTION, CONCENTRATE INTRAVENOUS at 17:35

## 2020-03-10 RX ADMIN — IPRATROPIUM BROMIDE AND ALBUTEROL SULFATE 1 AMPULE: .5; 3 SOLUTION RESPIRATORY (INHALATION) at 16:25

## 2020-03-10 RX ADMIN — IPRATROPIUM BROMIDE AND ALBUTEROL SULFATE 1 AMPULE: .5; 3 SOLUTION RESPIRATORY (INHALATION) at 19:20

## 2020-03-10 RX ADMIN — METOCLOPRAMIDE 10 MG: 5 INJECTION, SOLUTION INTRAMUSCULAR; INTRAVENOUS at 23:43

## 2020-03-10 RX ADMIN — TRAZODONE HYDROCHLORIDE 100 MG: 100 TABLET ORAL at 20:36

## 2020-03-10 RX ADMIN — AMITRIPTYLINE HYDROCHLORIDE 10 MG: 10 TABLET, FILM COATED ORAL at 13:41

## 2020-03-10 RX ADMIN — GABAPENTIN 300 MG: 300 CAPSULE ORAL at 13:41

## 2020-03-10 RX ADMIN — SODIUM CHLORIDE, POTASSIUM CHLORIDE, SODIUM LACTATE AND CALCIUM CHLORIDE: 600; 310; 30; 20 INJECTION, SOLUTION INTRAVENOUS at 21:42

## 2020-03-10 RX ADMIN — GABAPENTIN 300 MG: 300 CAPSULE ORAL at 09:50

## 2020-03-10 RX ADMIN — METOCLOPRAMIDE 10 MG: 5 INJECTION, SOLUTION INTRAMUSCULAR; INTRAVENOUS at 17:28

## 2020-03-10 RX ADMIN — AMITRIPTYLINE HYDROCHLORIDE 10 MG: 10 TABLET, FILM COATED ORAL at 09:52

## 2020-03-10 RX ADMIN — SODIUM CHLORIDE, PRESERVATIVE FREE 10 ML: 5 INJECTION INTRAVENOUS at 20:36

## 2020-03-10 RX ADMIN — BISACODYL 10 MG: 10 SUPPOSITORY RECTAL at 09:50

## 2020-03-10 RX ADMIN — CLONAZEPAM 0.5 MG: 0.5 TABLET ORAL at 20:36

## 2020-03-10 RX ADMIN — ESCITALOPRAM OXALATE 20 MG: 10 TABLET ORAL at 09:51

## 2020-03-10 RX ADMIN — SODIUM CHLORIDE, POTASSIUM CHLORIDE, SODIUM LACTATE AND CALCIUM CHLORIDE: 600; 310; 30; 20 INJECTION, SOLUTION INTRAVENOUS at 02:00

## 2020-03-10 RX ADMIN — IPRATROPIUM BROMIDE AND ALBUTEROL SULFATE 1 AMPULE: .5; 3 SOLUTION RESPIRATORY (INHALATION) at 11:34

## 2020-03-10 RX ADMIN — CLONAZEPAM 0.5 MG: 0.5 TABLET ORAL at 09:50

## 2020-03-10 RX ADMIN — BUSPIRONE HYDROCHLORIDE 20 MG: 10 TABLET ORAL at 20:36

## 2020-03-10 RX ADMIN — ONDANSETRON 4 MG: 2 INJECTION INTRAMUSCULAR; INTRAVENOUS at 08:48

## 2020-03-10 RX ADMIN — I.V. FAT EMULSION 100 ML: 20 EMULSION INTRAVENOUS at 17:34

## 2020-03-10 RX ADMIN — BUSPIRONE HYDROCHLORIDE 20 MG: 10 TABLET ORAL at 09:50

## 2020-03-10 RX ADMIN — SODIUM CHLORIDE 8 MG/HR: 9 INJECTION, SOLUTION INTRAVENOUS at 16:05

## 2020-03-10 ASSESSMENT — PAIN DESCRIPTION - LOCATION: LOCATION: ABDOMEN;LEG;BACK

## 2020-03-10 ASSESSMENT — PAIN DESCRIPTION - DESCRIPTORS: DESCRIPTORS: ACHING;DISCOMFORT

## 2020-03-10 ASSESSMENT — PAIN SCALES - GENERAL: PAINLEVEL_OUTOF10: 8

## 2020-03-10 ASSESSMENT — PAIN DESCRIPTION - PAIN TYPE: TYPE: ACUTE PAIN

## 2020-03-10 NOTE — PROGRESS NOTES
Nutrition Assessment (Parenteral Nutrition)    Type and Reason for Visit: Reassess    Nutrition Recommendations: Continue Parenteral Nutrition-increase Dex, AA in next bag. Continue to monitor PN adequacy/labs and for start of oral diet. Nutrition Assessment: Pt remains NPO , TPN continues. Malnutrition Assessment:  · Malnutrition Status: At risk for malnutrition  · Context: Acute illness or injury  · Findings of the 6 clinical characteristics of malnutrition (Minimum of 2 out of 6 clinical characteristics is required to make the diagnosis of moderate or severe Protein Calorie Malnutrition based on AND/ASPEN Guidelines):  1. Energy Intake-Greater than 75% of estimated energy requirement, Greater than or equal to 5 days    2. Weight Loss-No significant weight loss, (3 weeks)  3. Fat Loss-No significant subcutaneous fat loss,    4. Muscle Loss-No significant muscle mass loss,    5. Fluid Accumulation-Mild fluid accumulation, Extremities, Generalized  6.   Strength-Not measured    Nutrition Risk Level: High    Nutrient Needs:  · Estimated Daily Total Kcal: 1600 kcal/day   · Estimated Daily Protein (g):  g pro/day     Nutrition Diagnosis:   · Problem: Inadequate oral intake  · Etiology: related to Alteration in GI function     Signs and symptoms:  as evidenced by NPO status due to medical condition, Nutrition support - PN    Objective Information:  · Wound Type: Surgical Wound  · Current Nutrition Therapies:  · Oral Diet Orders: NPO   · Parenteral Nutrition Orders:  · Type and Formula: 2-in-1 Custom(250 g Dex, 80 g AA)   · Lipids: 100ml, Daily  · Rate/Volume: 55 mL/hr   · Duration: Continuous  · Current PN Order Provides: 1370 kcal and 80 g pro/day   · Anthropometric Measures:  · Ht: 5' 2\" (157.5 cm)   · Current Body Wt: 198 lb 10.2 oz (90.1 kg)  · Admission Body Wt: 197 lb 12 oz (89.7 kg)  · Usual Body Wt: (186 lb on 2/10/20)  · Ideal Body Wt: 110 lb (49.9 kg), % Ideal Body 179% (adm/ideal) · BMI Classification: BMI 35.0 - 39.9 Obese Class II    Nutrition Interventions:   Continue Parenteral Nutrition-increase Dex, AA in next bag.   Continued Inpatient Monitoring, Education Not Indicated    Nutrition Evaluation:   · Evaluation: Goal achieved   · Goals: meet % of estimated nutrition needs    · Monitoring: PN Intake, PN Tolerance, Pertinent Labs, Weight, Monitor Bowel Function, Nausea or Vomiting, I&O      Electronically signed by Sharifa Castaneda RD, VEE on 3/10/20 at 12:44 PM EDT    Contact Number: [phone]

## 2020-03-10 NOTE — PLAN OF CARE
Problem: Pain:  Description: Pain management should include both nonpharmacologic and pharmacologic interventions.   Goal: Pain level will decrease  Description: Pain level will decrease  3/10/2020 1527 by Dick Mijares RN  Outcome: Ongoing  3/10/2020 0740 by Carolyne Wick RN  Outcome: Ongoing  Goal: Control of acute pain  Description: Control of acute pain  3/10/2020 1527 by Dick Mijares RN  Outcome: Ongoing  3/10/2020 0740 by Carolyne Wick RN  Outcome: Ongoing  Goal: Control of chronic pain  Description: Control of chronic pain  3/10/2020 1527 by Dick Mijares RN  Outcome: Ongoing  3/10/2020 0740 by Carolyne Wick RN  Outcome: Ongoing     Problem: Discharge Planning:  Goal: Participates in care planning  Description: Participates in care planning  3/10/2020 1527 by Dick Mijares RN  Outcome: Ongoing  3/10/2020 0740 by Carolyne Wick RN  Outcome: Ongoing  Goal: Discharged to appropriate level of care  Description: Discharged to appropriate level of care  3/10/2020 1527 by Dick Mijares RN  Outcome: Ongoing  3/10/2020 0740 by Carolyne Wick RN  Outcome: Ongoing     Problem: Airway Clearance - Ineffective:  Goal: Ability to maintain a clear airway will improve  Description: Ability to maintain a clear airway will improve  3/10/2020 1527 by Dick Mijares RN  Outcome: Ongoing  3/10/2020 0740 by Carolyne Wick RN  Outcome: Ongoing     Problem: Anxiety/Stress:  Goal: Level of anxiety will decrease  Description: Level of anxiety will decrease  3/10/2020 1527 by Dick Mijares RN  Outcome: Ongoing  3/10/2020 0740 by Carolyne Wick RN  Outcome: Ongoing     Problem: Anxiety/Stress:  Goal: Level of anxiety will decrease  Description: Level of anxiety will decrease  3/10/2020 1527 by Dick Mijares RN  Outcome: Ongoing  3/10/2020 0740 by Carolyne Wick RN  Outcome: Ongoing     Problem: Aspiration:  Goal: Absence of aspiration  Description: Absence of aspiration  3/10/2020 1527 by Irina DO

## 2020-03-10 NOTE — PLAN OF CARE
improve  Outcome: Ongoing     Problem: Pain:  Goal: Recognizes and communicates pain  Description: Recognizes and communicates pain  Outcome: Ongoing  Goal: Control of acute pain  Description: Control of acute pain  Outcome: Ongoing  Goal: Control of chronic pain  Description: Control of chronic pain  Outcome: Ongoing     Problem: Skin Integrity - Impaired:  Goal: Will show no infection signs and symptoms  Description: Will show no infection signs and symptoms  Outcome: Ongoing  Goal: Absence of new skin breakdown  Description: Absence of new skin breakdown  Outcome: Ongoing     Problem: Sleep Pattern Disturbance:  Goal: Appears well-rested  Description: Appears well-rested  Outcome: Ongoing     Problem: Risk for Impaired Skin Integrity  Goal: Tissue integrity - skin and mucous membranes  Description: Structural intactness and normal physiological function of skin and  mucous membranes.   Outcome: Ongoing     Problem: Falls - Risk of:  Goal: Will remain free from falls  Description: Will remain free from falls  Outcome: Ongoing  Goal: Absence of physical injury  Description: Absence of physical injury  Outcome: Ongoing     Problem: OXYGENATION/RESPIRATORY FUNCTION  Goal: Patient will maintain patent airway  Outcome: Ongoing  Goal: Patient will achieve/maintain normal respiratory rate/effort  Description: Respiratory rate and effort will be within normal limits for the patient  Outcome: Ongoing     Problem: Restraint Use - Nonviolent/Non-Self-Destructive Behavior:  Goal: Absence of restraint indications  Description: Absence of restraint indications  Outcome: Ongoing  Goal: Absence of restraint-related injury  Description: Absence of restraint-related injury  Outcome: Ongoing     Problem: Nutrition  Goal: Optimal nutrition therapy  Outcome: Ongoing     Problem: Musculor/Skeletal Functional Status  Goal: Highest potential functional level  Outcome: Ongoing     Problem: RESPIRATORY  Intervention: Respiratory

## 2020-03-10 NOTE — PROGRESS NOTES
Surgery Progress Note            PATIENT NAME: Anuj Ram     TODAY'S DATE: 3/10/2020, 7:44 AM    SUBJECTIVE:    Pt S+E. Patient is continues to be very anxious. She is on precedex for anxiety per the ICU. She did have episode of emesis last night. She denies current nausea and is requesting water to drink. OBJECTIVE:   VITALS:  /68   Pulse 73   Temp 99.1 °F (37.3 °C) (Oral)   Resp (!) 34   Ht 5' 2\" (1.575 m)   Wt 198 lb 10.2 oz (90.1 kg)   SpO2 90%   BMI 36.33 kg/m²      INTAKE/OUTPUT:      Intake/Output Summary (Last 24 hours) at 3/10/2020 0744  Last data filed at 3/10/2020 0600  Gross per 24 hour   Intake 4602 ml   Output 1830 ml   Net 2772 ml       PHYSICAL EXAM  Constitutional:  Vital signs are normal. The patient appears well-developed and well-nourished. HEENT:      Head: Normocephalic. Atraumatic     Eyes: pupils are equal and reactive. No scleral icterus is present. Neck: No mass and no thyromegaly present. Cardiovascular: Normal rate, regular rhythm, S1 normal and S2 normal.  Bilateral pulses present. Pulmonary/Chest: Effort normal and breath sounds normal. No retractions. Abdominal: Soft. Incisions look fine. Tender at incisions. Musculoskeletal:      Right lower leg: Normal. No tenderness and no edema. Left lower leg: Normal. No tenderness and no edema. Lymphadenopathy:     No cervical adenopathy, No Exrtemity Adenopathy. Neurological: The patient is alert and oriented. Moving all four extremities equally, sensation grossly intact bilateral.  Skin: Skin is warm, dry and intact. POD#16 from hiatal hernia repair with syeda funduplication  Pneumonia- AM CXR, Abx per ICU  Anxiety     Plan:  1. Starts sips of Water; continue TPN  2. Bowel regimen  3. Steroids have been discontinued  4. Medical management per primary team. Will follow along. Electronically signed by Becca Holland DO  on 3/10/2020 at 7:44 AM     Patient seen and examined.   Agree with above assessment and plan. Very anxious. She admits that she works herself up and worsens her nausea. Hemodynamically, she is doing well. We will try sips of water again today. I tried to reinforce the need to go very slow.

## 2020-03-10 NOTE — PROGRESS NOTES
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 Home information is obtained through yes/no questions with pt and confirmed with son. Home information obtained from initial evaluation. Objective          AROM RLE (degrees)  RLE AROM: WFL  AROM LLE (degrees)  LLE AROM : WFL  AROM RUE (degrees)  RUE AROM : WFL  AROM LUE (degrees)  LUE AROM : WFL  Strength RLE  Comment: grossly 4-/5  Strength LLE  Comment: grossly 4-/5  Strength RUE  Comment: grossly 4-/5  Strength LUE  Comment: grossly 4-/5     Sensation  Overall Sensation Status: Impaired  Additional Comments: Pt reports numbness/tingling in bilateral hands and feet  Bed mobility  Supine to Sit: Moderate assistance(Cues for sequencing and use of bed rail)  Sit to Supine: Maximum assistance(mostly LE management)  Scooting: Moderate assistance  Comment: Pt able to sit EOB x8 minutes SBA this date. Pt very anxious and requires cues for sequencing but able to perform tasks. Transfers  Sit to Stand: Minimal Assistance;2 Person Assistance  Stand to sit: Minimal Assistance;2 Person Assistance  Comment: min Ax2 HHA- able to stand x30 seconds with max verbal cues secondary to anxiety.  Oxygen saturation remained in 90s throughout all attempts of mobility  Ambulation  Ambulation?: No  Stairs/Curb  Stairs?: No     Balance  Posture: Fair  Sitting - Static: Fair;+  Sitting - Dynamic: Fair  Standing - Static: Fair;-  Standing - Dynamic: Fair;-  Comments: Standing balance assessed with HHA        Plan   Plan  Times per week: 5x  Times per day: Daily  Current Treatment Recommendations: Strengthening, ROM, Functional Mobility Training, Home Exercise Program, Patient/Caregiver Education & Training, Positioning, Balance Training, Transfer

## 2020-03-10 NOTE — PROGRESS NOTES
Pt has vomited 3 times thus far this shift once moderately large amount brown coffee ground appearing liquid. Each time pt vomits she was coughing strongly prior. Resident informed.   zofran given

## 2020-03-10 NOTE — PROGRESS NOTES
prophylaxis-Lovenox held. EPC cuffs. Aspiration precautions  GI prophylaxis-PPI drip    Alpa Blanco MD      Department of Internal Medicine  [facility], [place]         3/10/2020, 1:03 PM    Attending Physician Statement  I have discussed the care of Obey Barnes, including pertinent history and exam findings with the resident. I have reviewed the key elements of all parts of the encounter with the resident. I have seen and examined the patient with the resident. I agree with the assessment and plan and status of the problem list as documented. I have seen the patient during my rounds today, have reviewed the labs, chart seen and medications reviewed. Yesterday she was started on Precedex drip he is much less anxious and much more cooperative today although she still complain of feeling nervous and anxious, she did not require any fentanyl this morning and she is on Precedex drip. She continues to have regurgitation and intermittent vomiting according to nursing staff especially every time she coughs she has regurgitation/vomiting, her hemoglobin has been stable and she is currently on Protonix drip, she is also on TPN she was seen by surgery and she was started on sips today, she is also started on her home dose of Klonopin, Lexapro, Neurontin and BuSpar if she tolerated oral.  She remained on high flow nasal cannula with 60% FiO2 and 40 L, she is not in distress but  mildly tachypneic and anxious, she has bilateral basal crackles she has poor inspiratory effort, she does not mobilize. We will try to wean Precedex drip. DC Protonix drip and start Protonix daily. Hold amitriptyline until confirmed dose. Continue with Lexapro, Klonopin, BuSpar and Neurontin. Will use fentanyl as needed. Aspiration precaution as much as can be done. Continue with DuoNeb aerosol    Discussed with son and updated. Discussed with nursing staff, treatment plan discussed.     Total critical care

## 2020-03-10 NOTE — PLAN OF CARE
BRONCHOSPASM/BRONCHOCONSTRICTION     [x]         IMPROVE AERATION/BREATH SOUNDS  [x]   ADMINISTER BRONCHODILATOR THERAPY AS APPROPRIATE  [x]   ASSESS BREATH SOUNDS  [x]   IMPLEMENT AEROSOL/MDI PROTOCOL  [x]   PATIENT EDUCATION AS NEEDED   PATIENT REFUSES TO WEAR BIPAP     [x] Risks and benefits explained to patient   [x] Patient refuses to wear Bipap  [x] Patient verbalizes understanding of information presented.       PROVIDE ADEQUATE OXYGENATION WITH ACCEPTABLE SP02/ABG'S    [x]  IDENTIFY APPROPRIATE OXYGEN THERAPY  [x]   MONITOR SP02/ABG'S AS NEEDED   [x]   PATIENT EDUCATION AS NEEDED

## 2020-03-11 LAB
-: NORMAL
ABSOLUTE EOS #: 0 K/UL (ref 0–0.4)
ABSOLUTE IMMATURE GRANULOCYTE: 0.48 K/UL (ref 0–0.3)
ABSOLUTE LYMPH #: 1.43 K/UL (ref 1–4.8)
ABSOLUTE MONO #: 0.48 K/UL (ref 0.1–0.8)
ANION GAP SERPL CALCULATED.3IONS-SCNC: 11 MMOL/L (ref 9–17)
BASOPHILS # BLD: 0 % (ref 0–2)
BASOPHILS ABSOLUTE: 0 K/UL (ref 0–0.2)
BUN BLDV-MCNC: 24 MG/DL (ref 8–23)
BUN/CREAT BLD: ABNORMAL (ref 9–20)
CALCIUM SERPL-MCNC: 8.2 MG/DL (ref 8.6–10.4)
CHLORIDE BLD-SCNC: 105 MMOL/L (ref 98–107)
CO2: 24 MMOL/L (ref 20–31)
CREAT SERPL-MCNC: 0.7 MG/DL (ref 0.5–0.9)
DIFFERENTIAL TYPE: ABNORMAL
EOSINOPHILS RELATIVE PERCENT: 0 % (ref 1–4)
GFR AFRICAN AMERICAN: >60 ML/MIN
GFR NON-AFRICAN AMERICAN: >60 ML/MIN
GFR SERPL CREATININE-BSD FRML MDRD: ABNORMAL ML/MIN/{1.73_M2}
GFR SERPL CREATININE-BSD FRML MDRD: ABNORMAL ML/MIN/{1.73_M2}
GLUCOSE BLD-MCNC: 139 MG/DL (ref 70–99)
GLUCOSE BLD-MCNC: 146 MG/DL (ref 65–105)
GLUCOSE BLD-MCNC: 157 MG/DL (ref 65–105)
HCT VFR BLD CALC: 30.7 % (ref 36.3–47.1)
HEMOGLOBIN: 9.7 G/DL (ref 11.9–15.1)
IMMATURE GRANULOCYTES: 2 %
LYMPHOCYTES # BLD: 6 % (ref 24–44)
MAGNESIUM: 2 MG/DL (ref 1.6–2.6)
MCH RBC QN AUTO: 31.5 PG (ref 25.2–33.5)
MCHC RBC AUTO-ENTMCNC: 31.6 G/DL (ref 28.4–34.8)
MCV RBC AUTO: 99.7 FL (ref 82.6–102.9)
MONOCYTES # BLD: 2 % (ref 1–7)
MORPHOLOGY: ABNORMAL
NRBC AUTOMATED: 0 PER 100 WBC
NUCLEATED RED BLOOD CELLS: 1 PER 100 WBC
PDW BLD-RTO: 15.1 % (ref 11.8–14.4)
PHOSPHORUS: 3.5 MG/DL (ref 2.6–4.5)
PLATELET # BLD: 362 K/UL (ref 138–453)
PLATELET ESTIMATE: ABNORMAL
PMV BLD AUTO: 12.4 FL (ref 8.1–13.5)
POTASSIUM SERPL-SCNC: 3.9 MMOL/L (ref 3.7–5.3)
RBC # BLD: 3.08 M/UL (ref 3.95–5.11)
RBC # BLD: ABNORMAL 10*6/UL
REASON FOR REJECTION: NORMAL
SEG NEUTROPHILS: 90 % (ref 36–66)
SEGMENTED NEUTROPHILS ABSOLUTE COUNT: 21.51 K/UL (ref 1.8–7.7)
SODIUM BLD-SCNC: 140 MMOL/L (ref 135–144)
WBC # BLD: 23.9 K/UL (ref 3.5–11.3)
WBC # BLD: ABNORMAL 10*3/UL
ZZ NTE CLEAN UP: ORDERED TEST: NORMAL
ZZ NTE WITH NAME CLEAN UP: SPECIMEN SOURCE: NORMAL

## 2020-03-11 PROCEDURE — 97530 THERAPEUTIC ACTIVITIES: CPT

## 2020-03-11 PROCEDURE — 6360000002 HC RX W HCPCS: Performed by: STUDENT IN AN ORGANIZED HEALTH CARE EDUCATION/TRAINING PROGRAM

## 2020-03-11 PROCEDURE — C9113 INJ PANTOPRAZOLE SODIUM, VIA: HCPCS | Performed by: STUDENT IN AN ORGANIZED HEALTH CARE EDUCATION/TRAINING PROGRAM

## 2020-03-11 PROCEDURE — 6370000000 HC RX 637 (ALT 250 FOR IP): Performed by: SURGERY

## 2020-03-11 PROCEDURE — 84100 ASSAY OF PHOSPHORUS: CPT

## 2020-03-11 PROCEDURE — 6370000000 HC RX 637 (ALT 250 FOR IP): Performed by: STUDENT IN AN ORGANIZED HEALTH CARE EDUCATION/TRAINING PROGRAM

## 2020-03-11 PROCEDURE — 2580000003 HC RX 258: Performed by: STUDENT IN AN ORGANIZED HEALTH CARE EDUCATION/TRAINING PROGRAM

## 2020-03-11 PROCEDURE — 83735 ASSAY OF MAGNESIUM: CPT

## 2020-03-11 PROCEDURE — 99291 CRITICAL CARE FIRST HOUR: CPT | Performed by: INTERNAL MEDICINE

## 2020-03-11 PROCEDURE — 99024 POSTOP FOLLOW-UP VISIT: CPT | Performed by: SURGERY

## 2020-03-11 PROCEDURE — 80048 BASIC METABOLIC PNL TOTAL CA: CPT

## 2020-03-11 PROCEDURE — 2500000003 HC RX 250 WO HCPCS: Performed by: STUDENT IN AN ORGANIZED HEALTH CARE EDUCATION/TRAINING PROGRAM

## 2020-03-11 PROCEDURE — 82947 ASSAY GLUCOSE BLOOD QUANT: CPT

## 2020-03-11 PROCEDURE — 94761 N-INVAS EAR/PLS OXIMETRY MLT: CPT

## 2020-03-11 PROCEDURE — 36415 COLL VENOUS BLD VENIPUNCTURE: CPT

## 2020-03-11 PROCEDURE — 97110 THERAPEUTIC EXERCISES: CPT

## 2020-03-11 PROCEDURE — 2700000000 HC OXYGEN THERAPY PER DAY

## 2020-03-11 PROCEDURE — 2000000000 HC ICU R&B

## 2020-03-11 PROCEDURE — 94640 AIRWAY INHALATION TREATMENT: CPT

## 2020-03-11 PROCEDURE — 85025 COMPLETE CBC W/AUTO DIFF WBC: CPT

## 2020-03-11 RX ORDER — GUAIFENESIN DEXTROMETHORPHAN HYDROBROMIDE ORAL SOLUTION 10; 100 MG/5ML; MG/5ML
10 SOLUTION ORAL EVERY 4 HOURS PRN
Status: DISCONTINUED | OUTPATIENT
Start: 2020-03-11 | End: 2020-04-06 | Stop reason: HOSPADM

## 2020-03-11 RX ORDER — CHLORPROMAZINE HYDROCHLORIDE 25 MG/1
25 TABLET, FILM COATED ORAL EVERY 6 HOURS PRN
Status: DISCONTINUED | OUTPATIENT
Start: 2020-03-11 | End: 2020-03-11

## 2020-03-11 RX ADMIN — METOPROLOL SUCCINATE 25 MG: 25 TABLET, FILM COATED, EXTENDED RELEASE ORAL at 20:01

## 2020-03-11 RX ADMIN — ACETAMINOPHEN 650 MG: 325 TABLET ORAL at 21:00

## 2020-03-11 RX ADMIN — PANTOPRAZOLE SODIUM 40 MG: 40 INJECTION, POWDER, FOR SOLUTION INTRAVENOUS at 08:04

## 2020-03-11 RX ADMIN — GABAPENTIN 300 MG: 300 CAPSULE ORAL at 08:06

## 2020-03-11 RX ADMIN — Medication 10 ML: at 10:07

## 2020-03-11 RX ADMIN — METOCLOPRAMIDE 10 MG: 5 INJECTION, SOLUTION INTRAMUSCULAR; INTRAVENOUS at 05:40

## 2020-03-11 RX ADMIN — IPRATROPIUM BROMIDE AND ALBUTEROL SULFATE 1 AMPULE: .5; 3 SOLUTION RESPIRATORY (INHALATION) at 16:05

## 2020-03-11 RX ADMIN — GABAPENTIN 300 MG: 300 CAPSULE ORAL at 13:12

## 2020-03-11 RX ADMIN — BUSPIRONE HYDROCHLORIDE 20 MG: 10 TABLET ORAL at 20:00

## 2020-03-11 RX ADMIN — BUSPIRONE HYDROCHLORIDE 20 MG: 10 TABLET ORAL at 08:04

## 2020-03-11 RX ADMIN — IPRATROPIUM BROMIDE AND ALBUTEROL SULFATE 1 AMPULE: .5; 3 SOLUTION RESPIRATORY (INHALATION) at 11:12

## 2020-03-11 RX ADMIN — GABAPENTIN 300 MG: 300 CAPSULE ORAL at 20:01

## 2020-03-11 RX ADMIN — PANTOPRAZOLE SODIUM 40 MG: 40 INJECTION, POWDER, FOR SOLUTION INTRAVENOUS at 19:59

## 2020-03-11 RX ADMIN — IPRATROPIUM BROMIDE AND ALBUTEROL SULFATE 1 AMPULE: .5; 3 SOLUTION RESPIRATORY (INHALATION) at 19:29

## 2020-03-11 RX ADMIN — ONDANSETRON 4 MG: 2 INJECTION INTRAMUSCULAR; INTRAVENOUS at 04:11

## 2020-03-11 RX ADMIN — BUSPIRONE HYDROCHLORIDE 20 MG: 10 TABLET ORAL at 13:12

## 2020-03-11 RX ADMIN — CLONAZEPAM 0.5 MG: 0.5 TABLET ORAL at 20:04

## 2020-03-11 RX ADMIN — METOCLOPRAMIDE 10 MG: 5 INJECTION, SOLUTION INTRAMUSCULAR; INTRAVENOUS at 17:42

## 2020-03-11 RX ADMIN — Medication 10 ML: at 20:19

## 2020-03-11 RX ADMIN — CLONAZEPAM 0.5 MG: 0.5 TABLET ORAL at 08:04

## 2020-03-11 RX ADMIN — TRAZODONE HYDROCHLORIDE 100 MG: 100 TABLET ORAL at 20:00

## 2020-03-11 RX ADMIN — IPRATROPIUM BROMIDE AND ALBUTEROL SULFATE 1 AMPULE: .5; 3 SOLUTION RESPIRATORY (INHALATION) at 07:38

## 2020-03-11 RX ADMIN — METOCLOPRAMIDE 10 MG: 5 INJECTION, SOLUTION INTRAMUSCULAR; INTRAVENOUS at 11:44

## 2020-03-11 RX ADMIN — ONDANSETRON 4 MG: 2 INJECTION INTRAMUSCULAR; INTRAVENOUS at 22:00

## 2020-03-11 RX ADMIN — SODIUM CHLORIDE, PRESERVATIVE FREE 10 ML: 5 INJECTION INTRAVENOUS at 08:05

## 2020-03-11 RX ADMIN — POTASSIUM CHLORIDE: 2 INJECTION, SOLUTION, CONCENTRATE INTRAVENOUS at 17:42

## 2020-03-11 RX ADMIN — ONDANSETRON 4 MG: 2 INJECTION INTRAMUSCULAR; INTRAVENOUS at 10:07

## 2020-03-11 RX ADMIN — ESCITALOPRAM OXALATE 20 MG: 10 TABLET ORAL at 08:05

## 2020-03-11 RX ADMIN — I.V. FAT EMULSION 100 ML: 20 EMULSION INTRAVENOUS at 17:42

## 2020-03-11 ASSESSMENT — PULMONARY FUNCTION TESTS: PEFR_L/MIN: 24

## 2020-03-11 ASSESSMENT — PAIN SCALES - GENERAL
PAINLEVEL_OUTOF10: 0
PAINLEVEL_OUTOF10: 8
PAINLEVEL_OUTOF10: 0

## 2020-03-11 ASSESSMENT — PAIN DESCRIPTION - PAIN TYPE
TYPE: SURGICAL PAIN
TYPE: CHRONIC PAIN

## 2020-03-11 NOTE — PROGRESS NOTES
signed by Cooper Rivera DO  on 3/11/2020 at 6:53 AM     Patient seen and examined. Agree with above A/P. Discussed situation with son and patient. She does relay that all her vomiting is occurring after coughing. She did hold down small amounts of water and some PO meds yesterday. Discussed trying to take in warm liquids today. Will also try some robitussin to help with cough suppression.

## 2020-03-11 NOTE — PROGRESS NOTES
used while assessing standing balance  Exercises  Comments: EOB ~12min, grossly CGA. cues throughout on deep breath, verbal and demonstration. Pt repsponded best to demonstration. RR up to 40 bpm, able to return to 24 bpm with demonstration. frequent rest breaks. supine 15x Bilat: ankle pumps, quad sets, glute sets. pt performed sit to stand 3x, ambulataed 2nd time. Slow with all mobility, initially dizzy EOB and returned supine, slowly raised HOB and less dizziness with 2nd attempt.    AROM RLE (degrees)  RLE AROM: WFL  AROM LLE (degrees)  LLE AROM : WFL  AROM RUE (degrees)  RUE AROM : WFL  AROM LUE (degrees)  LUE AROM : WFL           Goals  Short term goals  Time Frame for Short term goals: 14 visits  Short term goal 1: Pt to ambulate 100ft supervision with RW  Short term goal 2: Pt to sit <> stand transfer supervision  Short term goal 3: Pt to tolerate 30 minutes of therapy for endurance  Short term goal 4: Pt to demonstrate supervision bed mobility  Short term goal 5: Pt to demonstrate good to good - standing balance to decrease risk of falls  Patient Goals   Patient goals : Pt would like to feel better    Plan    Plan  Times per week: 5x  Times per day: Daily  Current Treatment Recommendations: Strengthening, ROM, Functional Mobility Training, Home Exercise Program, Patient/Caregiver Education & Training, Positioning, Balance Training, Transfer Training, Endurance Training, Gait Training, Safety Education & Training  Safety Devices  Type of devices: Call light within reach, Nurse notified, Gait belt, Patient at risk for falls, Left in bed, All fall risk precautions in place  Restraints  Initially in place: No  Restraints: None     Therapy Time   Individual Concurrent Group Co-treatment   Time In 0907         Time Out 0945         Minutes 38         Timed Code Treatment Minutes: 31 [facility] Ruth, PT

## 2020-03-11 NOTE — PROGRESS NOTES
2953-3740 24 Hour Total   INTAKE   Shift Total(mL/kg)       OUTPUT   Urine(mL/kg/hr) 410(0.6)   410   Shift Total(mL/kg) 410(4.6)   410(4.6)   Weight (kg) 90 90 90 90     Wt Readings from Last 3 Encounters:   03/11/20 198 lb 6.6 oz (90 kg)   02/29/20 186 lb 1.1 oz (84.4 kg)   02/24/20 183 lb 6.4 oz (83.2 kg)     Body mass index is 36.29 kg/m². PHYSICAL EXAM:  Constitutional: Patient is very anxious, keep on asking please see my life. She is alert and oriented to time place and person. Respiratory: Bilateral basal crackles with poor inspiratory effort. Clear to auscultation on anterior chest.  No wheezes appreciated. .  Cardiovascular: regular rate and rhythm, normal S1, S2, no murmur noted and 2+ pulses throughout  Abdomen: soft, nontender, nondistended, no masses or organomegaly  NEUROLOGIC. Alert and oriented but very anxious. Extremities:  peripheral pulses normal, no pedal edema,.       Any additional physical findings:      MEDICATIONS:  Scheduled Meds:   pantoprazole  40 mg Intravenous BID    And    sodium chloride (PF)  10 mL Intravenous BID    metoclopramide  10 mg Intravenous Q6H    scopolamine  1 patch Transdermal Q72H    milk and molasses  240 mL Rectal Once    bisacodyl  10 mg Rectal Daily    ipratropium-albuterol  1 ampule Inhalation 4x daily    lidocaine 1 % injection  5 mL Intradermal Once    sodium chloride flush  10 mL Intravenous 2 times per day    fat emulsion  100 mL Intravenous Daily    traZODone  100 mg Oral Nightly    magnesium citrate  296 mL Oral Once    [Held by provider] enoxaparin  40 mg Subcutaneous Daily    clonazePAM  0.5 mg Oral BID    [Held by provider] amitriptyline  10 mg Oral TID    [Held by provider] amLODIPine  10 mg Oral Nightly    gabapentin  300 mg Oral TID    sodium chloride flush  10 mL Intravenous 2 times per day    busPIRone  20 mg Oral TID    metoprolol succinate  25 mg Oral Nightly    nicotine  1 patch Transdermal Daily    sodium chloride (PF)  10 mL Intravenous Daily    escitalopram  20 mg Oral Daily     Continuous Infusions:   PN-Adult 2-in-1 Central Line (Standard) 60 mL/hr at 03/10/20 1735    dexmedetomidine (PRECEDEX) IV infusion Stopped (03/10/20 1915)    lactated ringers 50 mL/hr at 03/10/20 2142     PRN Meds:   chlorproMAZINE, 25 mg, Q6H PRN  albuterol, 2.5 mg, Q6H PRN  sodium chloride flush, 10 mL, PRN  potassium chloride, 40 mEq, PRN    Or  potassium alternative oral replacement, 40 mEq, PRN    Or  potassium chloride, 10 mEq, PRN  magnesium hydroxide, 30 mL, Daily PRN  sodium chloride flush, 10 mL, PRN  acetaminophen, 650 mg, Q6H PRN    Or  acetaminophen, 650 mg, Q6H PRN  polyethylene glycol, 17 g, Daily PRN  glycerin moisturizing mouth spray, 2 spray, PRN  metoprolol, 5 mg, Q6H PRN  ondansetron, 4 mg, Q6H PRN        SUPPORT DEVICES: [] Ventilator [] BIPAP  [x]?  Nasal Cannula (high flow heated O2)  [] Room Air    VENT SETTINGS (Comprehensive) (if applicable):  Vent Information  $Ventilation: $Subsequent Day  Ventilator Stopped: Yes  Skin Assessment: Clean, dry, & intact  Equipment Changed: Expiratory Filter  Vent Type: Servo i  Vent Mode: PRVC  Vt Ordered: 420 mL  Pressure Ordered: 8  Rate Set: 20 bmp  Pressure Support: 8 cmH20  FiO2 : 55 %  Sensitivity: 5  PEEP/CPAP: 12  I Time/ I Time %: 0.7 s  Humidification Source: Heated wire  Humidification Temp: 31  Humidification Temp Measured: 31  Circuit Condensation: Drained  Additional Respiratory  Assessments  Pulse: 105  Resp: (!) 39  SpO2: 94 %  End Tidal CO2: 33 (%)  Position: Shelton's  Humidification Source: Heated wire  Humidification Temp: 31  Circuit Condensation: Drained  Oral Care Completed?: Yes  Oral Care: Mouth swabbed, Mouth moisturizer, Mouth suctioned  Subglottic Suction Done?: Yes  Skin barrier applied: No    ABGs:     Lab Results   Component Value Date    WFC7LSB 26 03/08/2020    FIO2 75.0 03/08/2020     Lactic Acid:   Lab Results   Component Value Date    LACTA 4.1 protocol. 6. Centrilobular emphysema. Continue as needed DuoNeb and RT aerosol protocol  7. Status post laparoscopic hiatal hernia repair and Nissen fundoplication on 7/23/6999. XTOVFJC following. 8. Mild BENEDICT likely due to hypotension. Resolve  9. Leukocytosis likely reactive. Clinically afebrile and no signs of infection. Will continue to monitor         Diet-n.p.o.. Few sips of water permissible. DVT prophylaxis-Lovenox held. EPC cuffs. Aspiration precautions  GI prophylaxis-PPI drip          Leobardo Spangler MD.  Internal Medicine Resident   [address] [facility]   3/11/2020, 8:14 AM        Attending Physician Statement  I have discussed the care of Vannesa Torres, including pertinent history and exam findings with the resident. I have reviewed the key elements of all parts of the encounter with the resident. I have seen and examined the patient with the resident. I agree with the assessment and plan and status of the problem list as documented. I have reviewed the chart, lab seen and medications reviewed. Although she is afebrile but her white cell count is increased to 23 today  She is off Precedex drip and currently is on her oral medication although she is not keeping it down, she continued to have regurgitation from esophagus and vomiting every time she has cough, cough is likely related to regurgitation of gastric content through the esophagus causing her to have cough and because of increased intrathoracic pressure from cough most likely regurgitation of food from esophagus likely secondary to dysmotility. Need to follow-up WBC count and temperature. Need definite plan from surgery service and apparently surgery service does not want endoscopy to no need for GI evaluation unless surgery service wound endoscopy or they want to do endoscopy themselves as she continued to have vomiting and aspiration. She is on Reglan.   She is already been treated with clindamycin and Azactam initially for aspiration pneumonitis. She is only on sips and surgery want her to be on warm sips now we will continue with Klonopin Neurontin Lexapro and BuSpar if she is able to keep it down as she has severe anxiety. She is on high flow 60% and 40 L and will continue. Total critical care time caring for this patient with life threatening, unstable organ failure, including direct patient contact, management of life support systems, review of data including imaging and labs, discussions with other team members and physicians at least 27  Min so far today, excluding procedures. Please note that this chart was generated using voice recognition Dragon dictation software. Although every effort was made to ensure the accuracy of this automated transcription, some errors in transcription may have occurred.       William Maza MD  3/11/2020 11:56 AM

## 2020-03-11 NOTE — PLAN OF CARE
Problem: Pain:  Goal: Pain level will decrease  Description: Pain level will decrease  3/11/2020 0020 by Andrea Ruvalcaba RN  Outcome: Ongoing  3/10/2020 1527 by Mana Shukla RN  Outcome: Ongoing  Goal: Control of acute pain  Description: Control of acute pain  3/11/2020 0020 by Andrea Ruvalcaba RN  Outcome: Ongoing  3/10/2020 1527 by Mana Shukla RN  Outcome: Ongoing  Goal: Control of chronic pain  Description: Control of chronic pain  3/11/2020 0020 by Andrea Ruvalcaba RN  Outcome: Ongoing  3/10/2020 1527 by Mana Shukla RN  Outcome: Ongoing     Problem: Discharge Planning:  Goal: Participates in care planning  Description: Participates in care planning  3/11/2020 0020 by Andrea Ruvalcaba RN  Outcome: Ongoing  3/10/2020 1527 by Mana Shukla RN  Outcome: Ongoing  Goal: Discharged to appropriate level of care  Description: Discharged to appropriate level of care  3/11/2020 0020 by Andrea Ruvalcaba RN  Outcome: Ongoing  3/10/2020 1527 by Mana Shukla RN  Outcome: Ongoing     Problem: Airway Clearance - Ineffective:  Goal: Ability to maintain a clear airway will improve  Description: Ability to maintain a clear airway will improve  3/11/2020 0020 by Andrea Ruvalcaba RN  Outcome: Ongoing  3/10/2020 1527 by Mana Shukla RN  Outcome: Ongoing     Problem: Anxiety/Stress:  Goal: Level of anxiety will decrease  Description: Level of anxiety will decrease  3/11/2020 0020 by Andrea Ruvalcaba RN  Outcome: Ongoing  3/10/2020 1527 by Mana Shukla RN  Outcome: Ongoing     Problem: Aspiration:  Goal: Absence of aspiration  Description: Absence of aspiration  3/11/2020 0020 by Andrea Ruvalcaba RN  Outcome: Ongoing  3/10/2020 1527 by Mana Shukla RN  Outcome: Ongoing     Problem:  Bowel Function - Altered:  Goal: Bowel elimination is within specified parameters  Description: Bowel elimination is within specified parameters  3/11/2020 0020 by Andrea Ruvalcaba RN  Outcome: Ongoing  3/10/2020 1527 by Mana Shukla RN  Outcome: Ongoing Problem: Cardiac Output - Decreased:  Goal: Hemodynamic stability will improve  Description: Hemodynamic stability will improve  3/11/2020 0020 by Bubba Hernandez RN  Outcome: Ongoing  3/10/2020 1527 by Deepali Clark RN  Outcome: Ongoing     Problem: Fluid Volume - Imbalance:  Goal: Absence of imbalanced fluid volume signs and symptoms  Description: Absence of imbalanced fluid volume signs and symptoms  3/11/2020 0020 by Bubba Hernandez RN  Outcome: Ongoing  3/10/2020 1527 by Deepali Clark RN  Outcome: Ongoing     Problem: Gas Exchange - Impaired:  Goal: Levels of oxygenation will improve  Description: Levels of oxygenation will improve  3/11/2020 0020 by Bubba Hernandez RN  Outcome: Ongoing  3/10/2020 1527 by Deepali Clark RN  Outcome: Ongoing     Problem: Mental Status - Impaired:  Goal: Mental status will be restored to baseline  Description: Mental status will be restored to baseline  3/11/2020 0020 by Bubba Hernandez RN  Outcome: Ongoing  3/10/2020 1527 by Deepali Clark RN  Outcome: Ongoing     Problem: Nutrition Deficit:  Goal: Ability to achieve adequate nutritional intake will improve  Description: Ability to achieve adequate nutritional intake will improve  3/11/2020 0020 by Bubba Hernandez RN  Outcome: Ongoing  3/10/2020 1527 by Deepali Clark RN  Outcome: Ongoing     Problem: Pain:  Goal: Recognizes and communicates pain  Description: Recognizes and communicates pain  3/11/2020 0020 by Bubba Hernandez RN  Outcome: Ongoing  3/10/2020 1527 by Deepali Clark RN  Outcome: Ongoing  Goal: Control of acute pain  Description: Control of acute pain  3/11/2020 0020 by Bubba Hernandez RN  Outcome: Ongoing  3/10/2020 1527 by Deepali Clark RN  Outcome: Ongoing  Goal: Control of chronic pain  Description: Control of chronic pain  3/11/2020 0020 by Bubba Hernandez RN  Outcome: Ongoing  3/10/2020 1527 by Deepali Clark RN  Outcome: Ongoing     Problem: Skin Integrity - Impaired:  Goal: Will show no infection signs and symptoms  Description: Will show no infection signs and symptoms  3/11/2020 0020 by Olga Mcgraw RN  Outcome: Ongoing  3/10/2020 1527 by Atiya Cartagena RN  Outcome: Ongoing  Goal: Absence of new skin breakdown  Description: Absence of new skin breakdown  3/11/2020 0020 by Olga Mcgraw RN  Outcome: Ongoing  3/10/2020 1527 by Atiya Cartagena RN  Outcome: Ongoing     Problem: Sleep Pattern Disturbance:  Goal: Appears well-rested  Description: Appears well-rested  3/11/2020 0020 by Olga Mcgraw RN  Outcome: Ongoing  3/10/2020 1527 by Atiya Cartagena RN  Outcome: Ongoing     Problem: Risk for Impaired Skin Integrity  Goal: Tissue integrity - skin and mucous membranes  Description: Structural intactness and normal physiological function of skin and  mucous membranes.   3/11/2020 0020 by Olga Mcgraw RN  Outcome: Ongoing  3/10/2020 1527 by Atiya Cartagena RN  Outcome: Ongoing     Problem: Falls - Risk of:  Goal: Will remain free from falls  Description: Will remain free from falls  3/11/2020 0020 by Olga Mcgraw RN  Outcome: Ongoing  3/10/2020 1527 by Atiya Cartagena RN  Outcome: Ongoing  Goal: Absence of physical injury  Description: Absence of physical injury  3/11/2020 0020 by Olga Mcgraw RN  Outcome: Ongoing  3/10/2020 1527 by Atiya Cartagena RN  Outcome: Ongoing     Problem: OXYGENATION/RESPIRATORY FUNCTION  Goal: Patient will maintain patent airway  3/11/2020 0020 by Olga Mcgraw RN  Outcome: Ongoing  3/10/2020 1527 by Atiya Cartagena RN  Outcome: Ongoing  Goal: Patient will achieve/maintain normal respiratory rate/effort  Description: Respiratory rate and effort will be within normal limits for the patient  3/11/2020 0020 by Olga Mcgraw RN  Outcome: Ongoing  3/10/2020 1527 by Atiya Cartagena RN  Outcome: Ongoing     Problem: Nutrition  Goal: Optimal nutrition therapy  3/11/2020 0020 by Olga Mcgraw RN  Outcome: Ongoing  3/10/2020 1527 by Atiya Cartagena RN  Outcome: Ongoing     Problem: Musculor/Skeletal Functional Status  Goal: Highest potential functional level  3/11/2020 0020 by Ashely Hathaway RN  Outcome: Ongoing  3/10/2020 1527 by Monse See RN  Outcome: Ongoing

## 2020-03-11 NOTE — PLAN OF CARE
BRONCHOSPASM/BRONCHOCONSTRICTION     [x]         IMPROVE AERATION/BREATH SOUNDS  [x]   ADMINISTER BRONCHODILATOR THERAPY AS APPROPRIATE  [x]   ASSESS BREATH SOUNDS  [x]   IMPLEMENT AEROSOL/MDI PROTOCOL  []   PATIENT EDUCATION AS NEEDED

## 2020-03-11 NOTE — PLAN OF CARE
improve  Outcome: Ongoing     Problem: Pain:  Goal: Recognizes and communicates pain  Description: Recognizes and communicates pain  Outcome: Ongoing  Goal: Control of acute pain  Description: Control of acute pain  Outcome: Ongoing  Goal: Control of chronic pain  Description: Control of chronic pain  Outcome: Ongoing     Problem: Skin Integrity - Impaired:  Goal: Will show no infection signs and symptoms  Description: Will show no infection signs and symptoms  Outcome: Ongoing  Goal: Absence of new skin breakdown  Description: Absence of new skin breakdown  Outcome: Ongoing     Problem: Sleep Pattern Disturbance:  Goal: Appears well-rested  Description: Appears well-rested  Outcome: Ongoing     Problem: Risk for Impaired Skin Integrity  Goal: Tissue integrity - skin and mucous membranes  Description: Structural intactness and normal physiological function of skin and  mucous membranes.   Outcome: Ongoing     Problem: Falls - Risk of:  Goal: Will remain free from falls  Description: Will remain free from falls  Outcome: Ongoing  Goal: Absence of physical injury  Description: Absence of physical injury  Outcome: Ongoing     Problem: OXYGENATION/RESPIRATORY FUNCTION  Goal: Patient will maintain patent airway  Outcome: Ongoing  Goal: Patient will achieve/maintain normal respiratory rate/effort  Description: Respiratory rate and effort will be within normal limits for the patient  Outcome: Ongoing     Problem: Nutrition  Goal: Optimal nutrition therapy  Outcome: Ongoing     Problem: Musculor/Skeletal Functional Status  Goal: Highest potential functional level  Outcome: Ongoing

## 2020-03-12 LAB
ABSOLUTE EOS #: 0 K/UL (ref 0–0.4)
ABSOLUTE IMMATURE GRANULOCYTE: 0.62 K/UL (ref 0–0.3)
ABSOLUTE LYMPH #: 0.92 K/UL (ref 1–4.8)
ABSOLUTE MONO #: 0.46 K/UL (ref 0.1–0.8)
ALBUMIN SERPL-MCNC: 1.8 G/DL (ref 3.5–5.2)
ALBUMIN/GLOBULIN RATIO: 0.7 (ref 1–2.5)
ALP BLD-CCNC: 94 U/L (ref 35–104)
ALT SERPL-CCNC: 114 U/L (ref 5–33)
ANION GAP SERPL CALCULATED.3IONS-SCNC: 10 MMOL/L (ref 9–17)
AST SERPL-CCNC: 63 U/L
BASOPHILS # BLD: 0 % (ref 0–2)
BASOPHILS ABSOLUTE: 0 K/UL (ref 0–0.2)
BILIRUB SERPL-MCNC: 0.3 MG/DL (ref 0.3–1.2)
BUN BLDV-MCNC: 24 MG/DL (ref 8–23)
BUN/CREAT BLD: ABNORMAL (ref 9–20)
CALCIUM SERPL-MCNC: 7.9 MG/DL (ref 8.6–10.4)
CHLORIDE BLD-SCNC: 102 MMOL/L (ref 98–107)
CO2: 25 MMOL/L (ref 20–31)
CREAT SERPL-MCNC: 0.67 MG/DL (ref 0.5–0.9)
DIFFERENTIAL TYPE: ABNORMAL
EOSINOPHILS RELATIVE PERCENT: 0 % (ref 1–4)
GFR AFRICAN AMERICAN: >60 ML/MIN
GFR NON-AFRICAN AMERICAN: >60 ML/MIN
GFR SERPL CREATININE-BSD FRML MDRD: ABNORMAL ML/MIN/{1.73_M2}
GFR SERPL CREATININE-BSD FRML MDRD: ABNORMAL ML/MIN/{1.73_M2}
GLUCOSE BLD-MCNC: 134 MG/DL (ref 70–99)
GLUCOSE BLD-MCNC: 138 MG/DL (ref 65–105)
HCT VFR BLD CALC: 26.5 % (ref 36.3–47.1)
HEMOGLOBIN: 8.1 G/DL (ref 11.9–15.1)
IMMATURE GRANULOCYTES: 4 %
LYMPHOCYTES # BLD: 6 % (ref 24–44)
MCH RBC QN AUTO: 31.8 PG (ref 25.2–33.5)
MCHC RBC AUTO-ENTMCNC: 30.6 G/DL (ref 28.4–34.8)
MCV RBC AUTO: 103.9 FL (ref 82.6–102.9)
MONOCYTES # BLD: 3 % (ref 1–7)
MORPHOLOGY: ABNORMAL
MORPHOLOGY: ABNORMAL
NRBC AUTOMATED: 0.1 PER 100 WBC
PDW BLD-RTO: 15.2 % (ref 11.8–14.4)
PLATELET # BLD: 266 K/UL (ref 138–453)
PLATELET ESTIMATE: ABNORMAL
PMV BLD AUTO: 11.8 FL (ref 8.1–13.5)
POTASSIUM SERPL-SCNC: 3.8 MMOL/L (ref 3.7–5.3)
RBC # BLD: 2.55 M/UL (ref 3.95–5.11)
RBC # BLD: ABNORMAL 10*6/UL
SEG NEUTROPHILS: 87 % (ref 36–66)
SEGMENTED NEUTROPHILS ABSOLUTE COUNT: 13.4 K/UL (ref 1.8–7.7)
SODIUM BLD-SCNC: 137 MMOL/L (ref 135–144)
TOTAL PROTEIN: 4.5 G/DL (ref 6.4–8.3)
WBC # BLD: 15.4 K/UL (ref 3.5–11.3)
WBC # BLD: ABNORMAL 10*3/UL

## 2020-03-12 PROCEDURE — 2700000000 HC OXYGEN THERAPY PER DAY

## 2020-03-12 PROCEDURE — C9113 INJ PANTOPRAZOLE SODIUM, VIA: HCPCS | Performed by: STUDENT IN AN ORGANIZED HEALTH CARE EDUCATION/TRAINING PROGRAM

## 2020-03-12 PROCEDURE — 2500000003 HC RX 250 WO HCPCS: Performed by: STUDENT IN AN ORGANIZED HEALTH CARE EDUCATION/TRAINING PROGRAM

## 2020-03-12 PROCEDURE — 97166 OT EVAL MOD COMPLEX 45 MIN: CPT

## 2020-03-12 PROCEDURE — 80053 COMPREHEN METABOLIC PANEL: CPT

## 2020-03-12 PROCEDURE — 94640 AIRWAY INHALATION TREATMENT: CPT

## 2020-03-12 PROCEDURE — 85025 COMPLETE CBC W/AUTO DIFF WBC: CPT

## 2020-03-12 PROCEDURE — 97110 THERAPEUTIC EXERCISES: CPT

## 2020-03-12 PROCEDURE — 82947 ASSAY GLUCOSE BLOOD QUANT: CPT

## 2020-03-12 PROCEDURE — 97535 SELF CARE MNGMENT TRAINING: CPT

## 2020-03-12 PROCEDURE — 2580000003 HC RX 258: Performed by: EMERGENCY MEDICINE

## 2020-03-12 PROCEDURE — 6370000000 HC RX 637 (ALT 250 FOR IP): Performed by: STUDENT IN AN ORGANIZED HEALTH CARE EDUCATION/TRAINING PROGRAM

## 2020-03-12 PROCEDURE — 2580000003 HC RX 258: Performed by: STUDENT IN AN ORGANIZED HEALTH CARE EDUCATION/TRAINING PROGRAM

## 2020-03-12 PROCEDURE — 94761 N-INVAS EAR/PLS OXIMETRY MLT: CPT

## 2020-03-12 PROCEDURE — 6360000002 HC RX W HCPCS: Performed by: STUDENT IN AN ORGANIZED HEALTH CARE EDUCATION/TRAINING PROGRAM

## 2020-03-12 PROCEDURE — 36415 COLL VENOUS BLD VENIPUNCTURE: CPT

## 2020-03-12 PROCEDURE — 99291 CRITICAL CARE FIRST HOUR: CPT | Performed by: INTERNAL MEDICINE

## 2020-03-12 PROCEDURE — 97530 THERAPEUTIC ACTIVITIES: CPT

## 2020-03-12 PROCEDURE — 6370000000 HC RX 637 (ALT 250 FOR IP): Performed by: SURGERY

## 2020-03-12 PROCEDURE — 2000000000 HC ICU R&B

## 2020-03-12 RX ORDER — SODIUM CHLORIDE, SODIUM LACTATE, POTASSIUM CHLORIDE, AND CALCIUM CHLORIDE .6; .31; .03; .02 G/100ML; G/100ML; G/100ML; G/100ML
500 INJECTION, SOLUTION INTRAVENOUS ONCE
Status: COMPLETED | OUTPATIENT
Start: 2020-03-12 | End: 2020-03-12

## 2020-03-12 RX ADMIN — GABAPENTIN 300 MG: 300 CAPSULE ORAL at 13:23

## 2020-03-12 RX ADMIN — BUSPIRONE HYDROCHLORIDE 20 MG: 10 TABLET ORAL at 13:23

## 2020-03-12 RX ADMIN — ACETAMINOPHEN 650 MG: 325 TABLET ORAL at 12:01

## 2020-03-12 RX ADMIN — GABAPENTIN 300 MG: 300 CAPSULE ORAL at 21:13

## 2020-03-12 RX ADMIN — IPRATROPIUM BROMIDE AND ALBUTEROL SULFATE 1 AMPULE: .5; 3 SOLUTION RESPIRATORY (INHALATION) at 11:08

## 2020-03-12 RX ADMIN — SODIUM CHLORIDE, POTASSIUM CHLORIDE, SODIUM LACTATE AND CALCIUM CHLORIDE 500 ML: 600; 310; 30; 20 INJECTION, SOLUTION INTRAVENOUS at 15:25

## 2020-03-12 RX ADMIN — METOCLOPRAMIDE 10 MG: 5 INJECTION, SOLUTION INTRAMUSCULAR; INTRAVENOUS at 00:00

## 2020-03-12 RX ADMIN — METOCLOPRAMIDE 10 MG: 5 INJECTION, SOLUTION INTRAMUSCULAR; INTRAVENOUS at 11:48

## 2020-03-12 RX ADMIN — ONDANSETRON 4 MG: 2 INJECTION INTRAMUSCULAR; INTRAVENOUS at 02:05

## 2020-03-12 RX ADMIN — CLONAZEPAM 0.5 MG: 0.5 TABLET ORAL at 21:13

## 2020-03-12 RX ADMIN — I.V. FAT EMULSION 100 ML: 20 EMULSION INTRAVENOUS at 17:45

## 2020-03-12 RX ADMIN — ACETAMINOPHEN 650 MG: 325 TABLET ORAL at 20:06

## 2020-03-12 RX ADMIN — ONDANSETRON 4 MG: 2 INJECTION INTRAMUSCULAR; INTRAVENOUS at 21:08

## 2020-03-12 RX ADMIN — ESCITALOPRAM OXALATE 20 MG: 10 TABLET ORAL at 07:59

## 2020-03-12 RX ADMIN — BUSPIRONE HYDROCHLORIDE 20 MG: 10 TABLET ORAL at 21:13

## 2020-03-12 RX ADMIN — SODIUM CHLORIDE, PRESERVATIVE FREE 10 ML: 5 INJECTION INTRAVENOUS at 08:00

## 2020-03-12 RX ADMIN — GABAPENTIN 300 MG: 300 CAPSULE ORAL at 04:41

## 2020-03-12 RX ADMIN — GABAPENTIN 300 MG: 300 CAPSULE ORAL at 07:59

## 2020-03-12 RX ADMIN — PANTOPRAZOLE SODIUM 40 MG: 40 INJECTION, POWDER, FOR SOLUTION INTRAVENOUS at 07:59

## 2020-03-12 RX ADMIN — IPRATROPIUM BROMIDE AND ALBUTEROL SULFATE 1 AMPULE: .5; 3 SOLUTION RESPIRATORY (INHALATION) at 07:49

## 2020-03-12 RX ADMIN — Medication 10 ML: at 21:15

## 2020-03-12 RX ADMIN — ENOXAPARIN SODIUM 40 MG: 40 INJECTION SUBCUTANEOUS at 09:47

## 2020-03-12 RX ADMIN — CLONAZEPAM 0.5 MG: 0.5 TABLET ORAL at 07:57

## 2020-03-12 RX ADMIN — METOCLOPRAMIDE 10 MG: 5 INJECTION, SOLUTION INTRAMUSCULAR; INTRAVENOUS at 17:45

## 2020-03-12 RX ADMIN — METOCLOPRAMIDE 10 MG: 5 INJECTION, SOLUTION INTRAMUSCULAR; INTRAVENOUS at 06:07

## 2020-03-12 RX ADMIN — PANTOPRAZOLE SODIUM 40 MG: 40 INJECTION, POWDER, FOR SOLUTION INTRAVENOUS at 21:13

## 2020-03-12 RX ADMIN — ONDANSETRON 4 MG: 2 INJECTION INTRAMUSCULAR; INTRAVENOUS at 06:07

## 2020-03-12 RX ADMIN — BISACODYL 10 MG: 10 SUPPOSITORY RECTAL at 07:58

## 2020-03-12 RX ADMIN — IPRATROPIUM BROMIDE AND ALBUTEROL SULFATE 1 AMPULE: .5; 3 SOLUTION RESPIRATORY (INHALATION) at 15:15

## 2020-03-12 RX ADMIN — SODIUM CHLORIDE, POTASSIUM CHLORIDE, SODIUM LACTATE AND CALCIUM CHLORIDE: 600; 310; 30; 20 INJECTION, SOLUTION INTRAVENOUS at 04:41

## 2020-03-12 RX ADMIN — POTASSIUM CHLORIDE: 2 INJECTION, SOLUTION, CONCENTRATE INTRAVENOUS at 17:45

## 2020-03-12 RX ADMIN — IPRATROPIUM BROMIDE AND ALBUTEROL SULFATE 1 AMPULE: .5; 3 SOLUTION RESPIRATORY (INHALATION) at 19:54

## 2020-03-12 RX ADMIN — TRAZODONE HYDROCHLORIDE 100 MG: 100 TABLET ORAL at 21:13

## 2020-03-12 RX ADMIN — BUSPIRONE HYDROCHLORIDE 20 MG: 10 TABLET ORAL at 07:58

## 2020-03-12 RX ADMIN — SODIUM CHLORIDE, POTASSIUM CHLORIDE, SODIUM LACTATE AND CALCIUM CHLORIDE 500 ML: 600; 310; 30; 20 INJECTION, SOLUTION INTRAVENOUS at 18:33

## 2020-03-12 ASSESSMENT — PAIN DESCRIPTION - PAIN TYPE
TYPE: ACUTE PAIN;CHRONIC PAIN
TYPE: CHRONIC PAIN

## 2020-03-12 ASSESSMENT — PAIN SCALES - GENERAL
PAINLEVEL_OUTOF10: 0
PAINLEVEL_OUTOF10: 9
PAINLEVEL_OUTOF10: 0
PAINLEVEL_OUTOF10: 8
PAINLEVEL_OUTOF10: 5
PAINLEVEL_OUTOF10: 8
PAINLEVEL_OUTOF10: 6
PAINLEVEL_OUTOF10: 9

## 2020-03-12 ASSESSMENT — PAIN - FUNCTIONAL ASSESSMENT: PAIN_FUNCTIONAL_ASSESSMENT: ACTIVITIES ARE NOT PREVENTED

## 2020-03-12 ASSESSMENT — PAIN DESCRIPTION - LOCATION
LOCATION: LEG
LOCATION: BACK;KNEE
LOCATION: GENERALIZED
LOCATION: BACK;KNEE

## 2020-03-12 ASSESSMENT — PAIN DESCRIPTION - DESCRIPTORS: DESCRIPTORS: ACHING;DISCOMFORT;SORE

## 2020-03-12 ASSESSMENT — PAIN DESCRIPTION - ORIENTATION
ORIENTATION: RIGHT;LEFT;MID
ORIENTATION: RIGHT;LEFT

## 2020-03-12 ASSESSMENT — PAIN DESCRIPTION - FREQUENCY: FREQUENCY: CONTINUOUS

## 2020-03-12 ASSESSMENT — PAIN DESCRIPTION - PROGRESSION: CLINICAL_PROGRESSION: GRADUALLY WORSENING

## 2020-03-12 ASSESSMENT — PAIN DESCRIPTION - ONSET: ONSET: ON-GOING

## 2020-03-12 NOTE — PLAN OF CARE
Problem: Pain:  Description: Pain management should include both nonpharmacologic and pharmacologic interventions. Goal: Pain level will decrease  Description: Pain level will decrease  3/11/2020 2156 by Libia Huerat RN  Outcome: Ongoing  3/11/2020 1550 by Unruly Negron RN  Outcome: Ongoing  Goal: Control of acute pain  Description: Control of acute pain  3/11/2020 2156 by Libia Huerta RN  Outcome: Ongoing  3/11/2020 1550 by Unruly Negron RN  Outcome: Ongoing  Goal: Control of chronic pain  Description: Control of chronic pain  3/11/2020 2156 by Libia Huerta RN  Outcome: Ongoing  3/11/2020 1550 by Unruly Negron RN  Outcome: Ongoing     Problem: Discharge Planning:  Goal: Participates in care planning  Description: Participates in care planning  3/11/2020 2156 by Libia Huerta RN  Outcome: Ongoing  3/11/2020 1550 by Unruly Negron RN  Outcome: Ongoing  Goal: Discharged to appropriate level of care  Description: Discharged to appropriate level of care  3/11/2020 2156 by Libia Huerta RN  Outcome: Ongoing  3/11/2020 1550 by Unruly Negron RN  Outcome: Ongoing     Problem: Airway Clearance - Ineffective:  Goal: Ability to maintain a clear airway will improve  Description: Ability to maintain a clear airway will improve  3/11/2020 2156 by Libia Huerta RN  Outcome: Ongoing  3/11/2020 1550 by Unruly Negron RN  Outcome: Ongoing     Problem: Anxiety/Stress:  Goal: Level of anxiety will decrease  Description: Level of anxiety will decrease  3/11/2020 2156 by Libia Huerta RN  Outcome: Ongoing  3/11/2020 1550 by Unruly Negron RN  Outcome: Ongoing     Problem: Aspiration:  Goal: Absence of aspiration  Description: Absence of aspiration  3/11/2020 2156 by Libia Huerta RN  Outcome: Ongoing  3/11/2020 [place] by Unruly Negron RN  Outcome: Ongoing     Problem:  Bowel Function - Altered:  Goal: Bowel elimination is within specified Briseida Edmondson RN  Outcome: Ongoing  Goal: Patient will achieve/maintain normal respiratory rate/effort  Description: Respiratory rate and effort will be within normal limits for the patient  3/11/2020 2156 by Vani Arana RN  Outcome: Ongoing  3/11/2020 2126 by Cecilia Mcdonnell RCP  Outcome: Ongoing  3/11/2020 1550 by Briseida Edmondson RN  Outcome: Ongoing     Problem: Nutrition  Goal: Optimal nutrition therapy  3/11/2020 2156 by Vani Arana RN  Outcome: Ongoing  3/11/2020 1550 by Briseida Edmondson RN  Outcome: Ongoing     Problem: Musculor/Skeletal Functional Status  Goal: Highest potential functional level  3/11/2020 2156 by Vani Arana RN  Outcome: Ongoing  3/11/2020 1550 by Briseida Edmondson RN  Outcome: Ongoing

## 2020-03-12 NOTE — PLAN OF CARE
Problem: OXYGENATION/RESPIRATORY FUNCTION  Goal: Patient will maintain patent airway  3/11/2020 2126 by Ariella Hoyt RCP  Outcome: Ongoing  Goal: Patient will achieve/maintain normal respiratory rate/effort  Description: Respiratory rate and effort will be within normal limits for the patient  3/11/2020 2126 by Ariella Hoyt RCP  Outcome: Ongoing     BRONCHOSPASM/BRONCHOCONSTRICTION   [x]         IMPROVE AERATION/BREATH SOUNDS  [x]   ADMINISTER BRONCHODILATOR THERAPY AS APPROPRIATE  [x]   ASSESS BREATH SOUNDS  []   IMPLEMENT AEROSOL/MDI PROTOCOL  [x]   PATIENT EDUCATION AS NEEDED

## 2020-03-12 NOTE — CARE COORDINATION
Care Transition  Met with patient and son, patient is titrating down high flow requirements. If able to transition to nasal cannula oxygen, patient would like to go to [facility] where she once was before. Referral sent.      Called Samara Guidry at [facility] to notify of referral.

## 2020-03-12 NOTE — PROGRESS NOTES
Braces or Orthoses?: No  Position Activity Restriction  Other position/activity restrictions: up with assistance, extubated on 3/6; high flow nasal cannula  Subjective   General  Chart Reviewed: Yes  Response To Previous Treatment: Patient with no complaints from previous session. Family / Caregiver Present: No  Subjective  Subjective: Pt alert in bed; in good spirits and eager to mobilize. Reports 8/10 generalized pain. General Comment  Comments: Pt left in chair with call light in reach and BLEs elevated. Pain Screening  Patient Currently in Pain: Yes  Pain Assessment  Pain Assessment: 0-10  Pain Level: 8  Pain Type: Chronic pain  Pain Location: Generalized  Functional Pain Assessment: Activities are not prevented  Non-Pharmaceutical Pain Intervention(s): Ambulation/Increased Activity;Repositioned;Relaxation techniques; Emotional support;Distraction  Vital Signs  Patient Currently in Pain: Yes       Orientation  Orientation  Overall Orientation Status: Within Normal Limits  Cognition      Objective   Bed mobility  Supine to Sit: Moderate assistance  Scooting: Minimal assistance  Transfers  Sit to Stand: Minimal Assistance  Stand to sit: Minimal Assistance  Bed to Chair: Minimal assistance  Stand Pivot Transfers: Minimal Assistance  Ambulation  Ambulation?: Yes  Ambulation 1  Surface: level tile  Device: Rolling Walker  Assistance: Minimal assistance  Quality of Gait: decreased step length, required assist advancing RW, fatigued quickly  Distance: 1ft fwd and several lateral steps from bed to chair  Comments: on hi-flow O2; SPO2 remained 94% during transfer to chair  Stairs/Curb  Stairs?: No     Balance  Posture: Fair  Sitting - Static: Fair;+  Sitting - Dynamic: Fair  Standing - Static: Fair;-  Standing - Dynamic: Fair       Exercises:  Seated: LAQ x 5 reps  Ankle pumps x 10 reps    Goals  Short term goals  Time Frame for Short term goals: 14 visits  Short term goal 1: Pt to ambulate 100ft supervision with RW  Short term goal 2: Pt to sit <> stand transfer supervision  Short term goal 3: Pt to tolerate 30 minutes of therapy for endurance  Short term goal 4: Pt to demonstrate supervision bed mobility  Short term goal 5: Pt to demonstrate good to good - standing balance to decrease risk of falls  Patient Goals   Patient goals : Pt would like to feel better    Plan    Plan  Times per week: 5x  Times per day: Daily  Current Treatment Recommendations: Strengthening, ROM, Functional Mobility Training, Home Exercise Program, Patient/Caregiver Education & Training, Positioning, Balance Training, Transfer Training, Endurance Training, Gait Training, Safety Education & Training  Safety Devices  Type of devices:  All fall risk precautions in place, Left in chair, Call light within reach, Chair alarm in place, Gait belt, Patient at risk for falls, Nurse notified  Restraints  Initially in place: No  Restraints: None     Therapy Time   Individual Concurrent Group Co-treatment   Time In 0207         Time Out 0246         Minutes 39         Timed Code Treatment Minutes: Eleuterio Huddleston 157, PTA

## 2020-03-12 NOTE — PROGRESS NOTES
(03/12/20 0442)     PRN Meds:   dextromethorphan-guaiFENesin, 10 mL, Q4H PRN  albuterol, 2.5 mg, Q6H PRN  sodium chloride flush, 10 mL, PRN  potassium chloride, 40 mEq, PRN    Or  potassium alternative oral replacement, 40 mEq, PRN    Or  potassium chloride, 10 mEq, PRN  magnesium hydroxide, 30 mL, Daily PRN  sodium chloride flush, 10 mL, PRN  acetaminophen, 650 mg, Q6H PRN    Or  acetaminophen, 650 mg, Q6H PRN  polyethylene glycol, 17 g, Daily PRN  glycerin moisturizing mouth spray, 2 spray, PRN  metoprolol, 5 mg, Q6H PRN  ondansetron, 4 mg, Q6H PRN      SUPPORT DEVICES: [] Ventilator [] BIPAP  [x] Nasal Cannula (high flow heated O2)  Room Air    VENT SETTINGS (Comprehensive) (if applicable):  Vent Information  $Ventilation: $Subsequent Day  Ventilator Stopped: Yes  Skin Assessment: Clean, dry, & intact  Equipment Changed: Expiratory Filter  Vent Type: Servo i  Vent Mode: PRVC  Vt Ordered: 420 mL  Pressure Ordered: 8  Rate Set: 20 bmp  Pressure Support: 8 cmH20  FiO2 : 40 %  Sensitivity: 5  PEEP/CPAP: 12  I Time/ I Time %: 0.7 s  Humidification Source: Heated wire  Humidification Temp: 31  Humidification Temp Measured: 30.8  Circuit Condensation: Drained  Additional Respiratory  Assessments  Pulse: 98  Resp: 28  SpO2: 93 %  End Tidal CO2: 33 (%)  Position: Shelton's  Humidification Source: Heated wire  Humidification Temp: 31  Circuit Condensation: Drained  Oral Care Completed?: Yes  Oral Care: Mouth swabbed, Mouth moisturizer, Mouth suctioned  Subglottic Suction Done?: Yes  Skin barrier applied: No    ABGs:   Lab Results   Component Value Date    MBM2MIA 26 03/08/2020    FIO2 75.0 03/08/2020     Lactic Acid:   Lab Results   Component Value Date    LACTA 4.1 04/30/2018     DATA:  Complete Blood Count:   Recent Labs     03/10/20  0441 03/11/20  0544   WBC 19.8* 23.9*   HGB 9.8* 9.7*   .3 99.7    362   RBC 3.15* 3.08*   HCT 31.6* 30.7*   MCH 31.1 31.5   MCHC 31.0 31.6   RDW 14.8* 15.1*   MPV 11.9 12.4 signs of infection. Will continue to monitor         Diet-n.p.o. . Few sips of water permissible. DVT prophylaxis-Lovenox held.  EPC cuffs. Aspiration precautions  GI prophylaxis-PPI         Bina Garcia MD.  Internal Medicine Resident   [facility], [place]   3/12/2020, 5:23 AM      Attending Physician Statement  I have discussed the care of Nikky Lara, including pertinent history and exam findings with the resident. I have reviewed the key elements of all parts of the encounter with the resident. I have seen and examined the patient with the resident. I agree with the assessment and plan and status of the problem list as documented. I have seen the patient during my round today, reviewed chart and labs seen. She is doing better today she is able to tolerate some liquid diet without vomiting she does have mild cough but did not have any vomiting. She has low-grade temperature to 100 today but her WBC count is better 15.4. Clinically she looks better she is feeling better she is able to tolerate her multiple anti-anxiety medication and she is less nervous and less tremorous. She is on high flow nasal cannula with 40 to 50% FiO2 and on 30 L. Encourage her to have incentive spirometry deep breathing. On TPN and on liquid diet  On klonipin, neurontin, buspar, neurontin  On metoprolol her blood pressure is borderline    Total critical care time caring for this patient with life threatening, unstable organ failure, including direct patient contact, management of life support systems, review of data including imaging and labs, discussions with other team members and physicians at least 27  Min so far today, excluding procedures. Please note that this chart was generated using voice recognition Dragon dictation software. Although every effort was made to ensure the accuracy of this automated transcription, some errors in transcription may have occurred.       Devon Nolan MD  3/12/2020 2:09 PM

## 2020-03-12 NOTE — PROGRESS NOTES
Occupational Therapy   Occupational Therapy Initial Assessment  Date: 3/12/2020   Patient Name: Henna Hernandez  MRN: [id number]     : 1945    Copied from H&P:  Henna Hernandez is a 76 y. o. with PMH of presentation to [facility] from home after d/c yesteday from [facility]'s s/p nissen fundoplication on  with bariatrics by Dr. Mauri Wheatley. Pt came with worsening SOB since d/c, complaint of dark red emesis this morning. At outside hospital she was placed on Bipap after arriving with O2 sats 60%. Bp and HR were stable, but pt was quite dependent on bipap to maintain sat. CT-PE showed bilateral pleural effusions, no PE. Pt was afebrile, WBC 9.7; hg 13.2; creatine slightly over 1. Was started on vanc, levaquin, clindamycin due to penicillin allergy. Blood cultures were taken and the pt was transferred to [facility]'s ICU for impending resp failure with surgery consulted. Date of Service: 3/12/2020    Discharge Recommendations:    Further therapy recommended at discharge. OT Equipment Recommendations  Other: CTA pending progress     Assessment   Performance deficits / Impairments: Decreased functional mobility ; Decreased ADL status; Decreased endurance  Assessment: Pt would benefit from continued acute care and post acute care OT to address moderate deficits in ADL/ functional activities, endurance and functional transfers/ mobility. Pt required mod A for functional transfers and use of high flow O2. Treatment Diagnosis: Pneumonia  Prognosis: Good  Decision Making: Medium Complexity  OT Education: Plan of Care;OT Role;Transfer Training  REQUIRES OT FOLLOW UP: Yes  Activity Tolerance  Activity Tolerance: Patient Tolerated treatment well  Safety Devices  Safety Devices in place: Yes  Type of devices: Patient at risk for falls;Call light within reach;Nurse notified; Left in bed;Gait belt  Restraints  Initially in place: No         Patient Diagnosis(es): There were no encounter diagnoses.      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 Home information is obtained through yes/no questions with pt and confirmed with son. Objective   Vision: Within Functional Limits  Hearing: Within functional limits    Orientation  Overall Orientation Status: Within Functional Limits  Observation/Palpation  Posture: Good  Observation: pt reports anxiety throughout session, cues for proper pursed lipped breathing   Balance  Sitting Balance: Contact guard assistance(seated EOB )  Standing Balance: Minimal assistance(2 person assistance for safety )  Functional Mobility  Functional - Mobility Device: Other(face to face assistance )  Activity: Other  Assist Level: Moderate assistance  Functional Mobility Comments: steps <> BSC  Toilet Transfers  Toilet - Technique: Stand step  Equipment Used: Standard bedside commode  Toilet Transfer: Moderate assistance  Toilet Transfers Comments: min A x2      ADL  Feeding: Independent  Grooming: Contact guard assistance;Setup(to wash face seated on BSC)  UE Bathing: Minimal assistance;Setup(to wash back seated on BSC)  LE Bathing: Moderate assistance;Setup  UE Dressing: Minimal assistance;Setup(to don/ doff gown seated EOB )  LE Dressing: Moderate assistance;Setup(to don socks supine in bed )  Toileting: Maximum assistance(pericare following toileting on BSC)  Additional Comments: Pt supine in bed on arrival. Pt assisted to complete bed mobility and sit at EOB. Pt required VCs for proper pursed lipped breathing reports increased anxiety with activity. Pt assisted to complete sit > stand transfer an steps to [facility]. Pt completed toileting and was assisted to complete pericare. Pt assisted to return to bed, call light in reach and RN notiifed on therapist exit. Tone RUE  RUE Tone: Normotonic  Tone LUE  LUE Tone: Normotonic  Coordination  Movements Are Fluid And Coordinated: Yes        Bed mobility  Supine to Sit: Minimal assistance  Sit to Supine:  Moderate assistance  Scooting: Contact guard assistance  Comment: HOB raised      Transfers  Stand Step Transfers: Minimal assistance;2 Person assistance  Sit to stand: Minimal assistance;2 Person assistance  Stand to sit: Minimal assistance;2 Person assistance    Cognition  Overall Cognitive Status: [facility]  Cognition Comment: Pt reports aniety during session, respiratory rate between 25-27, -105     Perception  Overall Perceptual Status: [facility]        Sensation  Overall Sensation Status: Impaired  Additional Comments: Pt reports numbness/tingling in bilateral fingers and toes, chronic    LUE AROM (degrees)  LUE AROM : WFL  RUE AROM (degrees)  RUE AROM : WFL  LUE Strength  Gross LUE Strength: WFL  RUE Strength  Gross RUE Strength: WFL     Plan   Plan  Times per week: 4-5x/wk   Current Treatment Recommendations: Functional Mobility Training, Endurance Training, Safety Education & Training, Equipment Evaluation, Education, & procurement, Patient/Caregiver Education & Training, Self-Care / ADL    AM-PAC Score  AM-[facility] Inpatient Daily Activity Raw Score: 16 (03/12/20 1329)  AM-PAC Inpatient ADL T-Scale Score : 35.96 (03/12/20 1329)  ADL Inpatient CMS 0-100% Score: 53.32 (03/12/20 1329)  ADL Inpatient CMS G-Code Modifier : CK (03/12/20 1329)    Goals  Short term goals  Time Frame for Short term goals: by discharge, pt will  Short term goal 1: demo I in UE ADL activities   Short term goal 2: demo mod A for LE ADL activities   Short term goal 3: demo CGA for functional transfers/ mobility with use of LRD and good safety

## 2020-03-13 LAB
ABSOLUTE EOS #: 0 K/UL (ref 0–0.4)
ABSOLUTE IMMATURE GRANULOCYTE: 0.52 K/UL (ref 0–0.3)
ABSOLUTE LYMPH #: 1.7 K/UL (ref 1–4.8)
ABSOLUTE MONO #: 0.52 K/UL (ref 0.1–0.8)
ANION GAP SERPL CALCULATED.3IONS-SCNC: 8 MMOL/L (ref 9–17)
BASOPHILS # BLD: 0 % (ref 0–2)
BASOPHILS ABSOLUTE: 0 K/UL (ref 0–0.2)
BUN BLDV-MCNC: 27 MG/DL (ref 8–23)
BUN/CREAT BLD: ABNORMAL (ref 9–20)
CALCIUM SERPL-MCNC: 7.9 MG/DL (ref 8.6–10.4)
CHLORIDE BLD-SCNC: 106 MMOL/L (ref 98–107)
CO2: 25 MMOL/L (ref 20–31)
CREAT SERPL-MCNC: 0.63 MG/DL (ref 0.5–0.9)
DIFFERENTIAL TYPE: ABNORMAL
EOSINOPHILS RELATIVE PERCENT: 0 % (ref 1–4)
GFR AFRICAN AMERICAN: >60 ML/MIN
GFR NON-AFRICAN AMERICAN: >60 ML/MIN
GFR SERPL CREATININE-BSD FRML MDRD: ABNORMAL ML/MIN/{1.73_M2}
GFR SERPL CREATININE-BSD FRML MDRD: ABNORMAL ML/MIN/{1.73_M2}
GLUCOSE BLD-MCNC: 131 MG/DL (ref 70–99)
GLUCOSE BLD-MCNC: 142 MG/DL (ref 65–105)
HCT VFR BLD CALC: 23.7 % (ref 36.3–47.1)
HEMOGLOBIN: 7.3 G/DL (ref 11.9–15.1)
IMMATURE GRANULOCYTES: 4 %
LYMPHOCYTES # BLD: 13 % (ref 24–44)
MAGNESIUM: 1.9 MG/DL (ref 1.6–2.6)
MCH RBC QN AUTO: 31.6 PG (ref 25.2–33.5)
MCHC RBC AUTO-ENTMCNC: 30.8 G/DL (ref 28.4–34.8)
MCV RBC AUTO: 102.6 FL (ref 82.6–102.9)
MONOCYTES # BLD: 4 % (ref 1–7)
MORPHOLOGY: ABNORMAL
MORPHOLOGY: ABNORMAL
NRBC AUTOMATED: 0.2 PER 100 WBC
PDW BLD-RTO: 15.4 % (ref 11.8–14.4)
PHOSPHORUS: 3.3 MG/DL (ref 2.6–4.5)
PLATELET # BLD: 301 K/UL (ref 138–453)
PLATELET ESTIMATE: ABNORMAL
PMV BLD AUTO: 12.1 FL (ref 8.1–13.5)
POTASSIUM SERPL-SCNC: 3.9 MMOL/L (ref 3.7–5.3)
RBC # BLD: 2.31 M/UL (ref 3.95–5.11)
RBC # BLD: ABNORMAL 10*6/UL
SEG NEUTROPHILS: 79 % (ref 36–66)
SEGMENTED NEUTROPHILS ABSOLUTE COUNT: 10.36 K/UL (ref 1.8–7.7)
SODIUM BLD-SCNC: 139 MMOL/L (ref 135–144)
WBC # BLD: 13.1 K/UL (ref 3.5–11.3)
WBC # BLD: ABNORMAL 10*3/UL

## 2020-03-13 PROCEDURE — 2580000003 HC RX 258: Performed by: STUDENT IN AN ORGANIZED HEALTH CARE EDUCATION/TRAINING PROGRAM

## 2020-03-13 PROCEDURE — 6360000002 HC RX W HCPCS: Performed by: STUDENT IN AN ORGANIZED HEALTH CARE EDUCATION/TRAINING PROGRAM

## 2020-03-13 PROCEDURE — 2000000000 HC ICU R&B

## 2020-03-13 PROCEDURE — 36415 COLL VENOUS BLD VENIPUNCTURE: CPT

## 2020-03-13 PROCEDURE — 83735 ASSAY OF MAGNESIUM: CPT

## 2020-03-13 PROCEDURE — 6370000000 HC RX 637 (ALT 250 FOR IP): Performed by: SURGERY

## 2020-03-13 PROCEDURE — 94640 AIRWAY INHALATION TREATMENT: CPT

## 2020-03-13 PROCEDURE — 99024 POSTOP FOLLOW-UP VISIT: CPT | Performed by: SURGERY

## 2020-03-13 PROCEDURE — 97530 THERAPEUTIC ACTIVITIES: CPT

## 2020-03-13 PROCEDURE — 2580000003 HC RX 258: Performed by: EMERGENCY MEDICINE

## 2020-03-13 PROCEDURE — 85025 COMPLETE CBC W/AUTO DIFF WBC: CPT

## 2020-03-13 PROCEDURE — C9113 INJ PANTOPRAZOLE SODIUM, VIA: HCPCS | Performed by: STUDENT IN AN ORGANIZED HEALTH CARE EDUCATION/TRAINING PROGRAM

## 2020-03-13 PROCEDURE — 6370000000 HC RX 637 (ALT 250 FOR IP): Performed by: STUDENT IN AN ORGANIZED HEALTH CARE EDUCATION/TRAINING PROGRAM

## 2020-03-13 PROCEDURE — 80048 BASIC METABOLIC PNL TOTAL CA: CPT

## 2020-03-13 PROCEDURE — 84100 ASSAY OF PHOSPHORUS: CPT

## 2020-03-13 PROCEDURE — 2500000003 HC RX 250 WO HCPCS: Performed by: STUDENT IN AN ORGANIZED HEALTH CARE EDUCATION/TRAINING PROGRAM

## 2020-03-13 PROCEDURE — 2700000000 HC OXYGEN THERAPY PER DAY

## 2020-03-13 PROCEDURE — 99291 CRITICAL CARE FIRST HOUR: CPT | Performed by: INTERNAL MEDICINE

## 2020-03-13 PROCEDURE — 82947 ASSAY GLUCOSE BLOOD QUANT: CPT

## 2020-03-13 PROCEDURE — 97110 THERAPEUTIC EXERCISES: CPT

## 2020-03-13 RX ORDER — SODIUM CHLORIDE, SODIUM LACTATE, POTASSIUM CHLORIDE, AND CALCIUM CHLORIDE .6; .31; .03; .02 G/100ML; G/100ML; G/100ML; G/100ML
500 INJECTION, SOLUTION INTRAVENOUS ONCE
Status: COMPLETED | OUTPATIENT
Start: 2020-03-13 | End: 2020-03-13

## 2020-03-13 RX ADMIN — SODIUM CHLORIDE, POTASSIUM CHLORIDE, SODIUM LACTATE AND CALCIUM CHLORIDE: 600; 310; 30; 20 INJECTION, SOLUTION INTRAVENOUS at 00:54

## 2020-03-13 RX ADMIN — TRAZODONE HYDROCHLORIDE 100 MG: 100 TABLET ORAL at 20:17

## 2020-03-13 RX ADMIN — IPRATROPIUM BROMIDE AND ALBUTEROL SULFATE 1 AMPULE: .5; 3 SOLUTION RESPIRATORY (INHALATION) at 11:06

## 2020-03-13 RX ADMIN — I.V. FAT EMULSION 100 ML: 20 EMULSION INTRAVENOUS at 18:03

## 2020-03-13 RX ADMIN — Medication 10 ML: at 03:06

## 2020-03-13 RX ADMIN — BUSPIRONE HYDROCHLORIDE 20 MG: 10 TABLET ORAL at 08:29

## 2020-03-13 RX ADMIN — POTASSIUM CHLORIDE: 2 INJECTION, SOLUTION, CONCENTRATE INTRAVENOUS at 18:01

## 2020-03-13 RX ADMIN — ACETAMINOPHEN 650 MG: 325 TABLET ORAL at 03:14

## 2020-03-13 RX ADMIN — SODIUM CHLORIDE, POTASSIUM CHLORIDE, SODIUM LACTATE AND CALCIUM CHLORIDE 500 ML: 600; 310; 30; 20 INJECTION, SOLUTION INTRAVENOUS at 18:23

## 2020-03-13 RX ADMIN — PANTOPRAZOLE SODIUM 40 MG: 40 INJECTION, POWDER, FOR SOLUTION INTRAVENOUS at 08:30

## 2020-03-13 RX ADMIN — Medication 10 ML: at 08:30

## 2020-03-13 RX ADMIN — BUSPIRONE HYDROCHLORIDE 20 MG: 10 TABLET ORAL at 14:43

## 2020-03-13 RX ADMIN — METOCLOPRAMIDE 10 MG: 5 INJECTION, SOLUTION INTRAMUSCULAR; INTRAVENOUS at 00:30

## 2020-03-13 RX ADMIN — Medication 10 ML: at 14:42

## 2020-03-13 RX ADMIN — IPRATROPIUM BROMIDE AND ALBUTEROL SULFATE 1 AMPULE: .5; 3 SOLUTION RESPIRATORY (INHALATION) at 08:08

## 2020-03-13 RX ADMIN — IPRATROPIUM BROMIDE AND ALBUTEROL SULFATE 1 AMPULE: .5; 3 SOLUTION RESPIRATORY (INHALATION) at 14:48

## 2020-03-13 RX ADMIN — GABAPENTIN 300 MG: 300 CAPSULE ORAL at 14:43

## 2020-03-13 RX ADMIN — ENOXAPARIN SODIUM 40 MG: 40 INJECTION SUBCUTANEOUS at 08:28

## 2020-03-13 RX ADMIN — ACETAMINOPHEN 650 MG: 325 TABLET ORAL at 08:26

## 2020-03-13 RX ADMIN — ONDANSETRON 4 MG: 2 INJECTION INTRAMUSCULAR; INTRAVENOUS at 03:06

## 2020-03-13 RX ADMIN — PANTOPRAZOLE SODIUM 40 MG: 40 INJECTION, POWDER, FOR SOLUTION INTRAVENOUS at 20:14

## 2020-03-13 RX ADMIN — ONDANSETRON 4 MG: 2 INJECTION INTRAMUSCULAR; INTRAVENOUS at 10:30

## 2020-03-13 RX ADMIN — GABAPENTIN 300 MG: 300 CAPSULE ORAL at 08:28

## 2020-03-13 RX ADMIN — SODIUM CHLORIDE, POTASSIUM CHLORIDE, SODIUM LACTATE AND CALCIUM CHLORIDE: 600; 310; 30; 20 INJECTION, SOLUTION INTRAVENOUS at 18:18

## 2020-03-13 RX ADMIN — IPRATROPIUM BROMIDE AND ALBUTEROL SULFATE 1 AMPULE: .5; 3 SOLUTION RESPIRATORY (INHALATION) at 20:35

## 2020-03-13 RX ADMIN — ESCITALOPRAM OXALATE 20 MG: 10 TABLET ORAL at 08:27

## 2020-03-13 RX ADMIN — METOPROLOL SUCCINATE 25 MG: 25 TABLET, FILM COATED, EXTENDED RELEASE ORAL at 20:54

## 2020-03-13 RX ADMIN — ACETAMINOPHEN 650 MG: 325 TABLET ORAL at 20:54

## 2020-03-13 RX ADMIN — SODIUM CHLORIDE, PRESERVATIVE FREE 10 ML: 5 INJECTION INTRAVENOUS at 20:15

## 2020-03-13 RX ADMIN — Medication 10 ML: at 23:26

## 2020-03-13 RX ADMIN — GABAPENTIN 300 MG: 300 CAPSULE ORAL at 20:14

## 2020-03-13 RX ADMIN — CLONAZEPAM 0.5 MG: 0.5 TABLET ORAL at 08:27

## 2020-03-13 RX ADMIN — CLONAZEPAM 0.5 MG: 0.5 TABLET ORAL at 20:13

## 2020-03-13 RX ADMIN — METOCLOPRAMIDE 10 MG: 5 INJECTION, SOLUTION INTRAMUSCULAR; INTRAVENOUS at 06:26

## 2020-03-13 RX ADMIN — METOCLOPRAMIDE 10 MG: 5 INJECTION, SOLUTION INTRAMUSCULAR; INTRAVENOUS at 18:00

## 2020-03-13 RX ADMIN — BUSPIRONE HYDROCHLORIDE 20 MG: 10 TABLET ORAL at 20:17

## 2020-03-13 RX ADMIN — ACETAMINOPHEN 650 MG: 325 TABLET ORAL at 14:41

## 2020-03-13 RX ADMIN — Medication 10 ML: at 20:14

## 2020-03-13 RX ADMIN — METOCLOPRAMIDE 10 MG: 5 INJECTION, SOLUTION INTRAMUSCULAR; INTRAVENOUS at 12:36

## 2020-03-13 ASSESSMENT — PAIN SCALES - WONG BAKER
WONGBAKER_NUMERICALRESPONSE: 8

## 2020-03-13 ASSESSMENT — PAIN DESCRIPTION - PROGRESSION
CLINICAL_PROGRESSION: NOT CHANGED
CLINICAL_PROGRESSION: GRADUALLY WORSENING

## 2020-03-13 ASSESSMENT — PAIN DESCRIPTION - ONSET
ONSET: ON-GOING

## 2020-03-13 ASSESSMENT — PAIN DESCRIPTION - PAIN TYPE
TYPE: CHRONIC PAIN;ACUTE PAIN
TYPE: CHRONIC PAIN
TYPE: ACUTE PAIN;CHRONIC PAIN

## 2020-03-13 ASSESSMENT — PAIN SCALES - GENERAL
PAINLEVEL_OUTOF10: 8
PAINLEVEL_OUTOF10: 2
PAINLEVEL_OUTOF10: 8
PAINLEVEL_OUTOF10: 2
PAINLEVEL_OUTOF10: 4

## 2020-03-13 ASSESSMENT — PAIN DESCRIPTION - FREQUENCY
FREQUENCY: CONTINUOUS

## 2020-03-13 ASSESSMENT — PAIN DESCRIPTION - LOCATION
LOCATION: ABDOMEN;BACK;KNEE
LOCATION: ABDOMEN

## 2020-03-13 ASSESSMENT — PAIN DESCRIPTION - DESCRIPTORS
DESCRIPTORS: SORE
DESCRIPTORS: DISCOMFORT
DESCRIPTORS: SORE

## 2020-03-13 ASSESSMENT — PAIN DESCRIPTION - ORIENTATION
ORIENTATION: MID
ORIENTATION: RIGHT;LEFT

## 2020-03-13 NOTE — PLAN OF CARE
improve  Outcome: Ongoing     Problem: Pain:  Goal: Recognizes and communicates pain  Description: Recognizes and communicates pain  Outcome: Ongoing  Goal: Control of acute pain  Description: Control of acute pain  Outcome: Ongoing  Goal: Control of chronic pain  Description: Control of chronic pain  Outcome: Ongoing     Problem: Skin Integrity - Impaired:  Goal: Will show no infection signs and symptoms  Description: Will show no infection signs and symptoms  Outcome: Ongoing  Goal: Absence of new skin breakdown  Description: Absence of new skin breakdown  Outcome: Ongoing     Problem: Sleep Pattern Disturbance:  Goal: Appears well-rested  Description: Appears well-rested  Outcome: Ongoing     Problem: Risk for Impaired Skin Integrity  Goal: Tissue integrity - skin and mucous membranes  Description: Structural intactness and normal physiological function of skin and  mucous membranes.   Outcome: Ongoing     Problem: Falls - Risk of:  Goal: Will remain free from falls  Description: Will remain free from falls  Outcome: Ongoing  Goal: Absence of physical injury  Description: Absence of physical injury  Outcome: Ongoing     Problem: OXYGENATION/RESPIRATORY FUNCTION  Goal: Patient will maintain patent airway  3/13/2020 0435 by Krystian Lewis RN  Outcome: Ongoing  3/12/2020 2202 by Roderick Bryant RCP  Outcome: Ongoing  Goal: Patient will achieve/maintain normal respiratory rate/effort  Description: Respiratory rate and effort will be within normal limits for the patient  3/13/2020 0435 by Krystian Lewis RN  Outcome: Ongoing  3/12/2020 2202 by Roderick Bryant RCP  Outcome: Ongoing     Problem: Nutrition  Goal: Optimal nutrition therapy  Outcome: Ongoing     Problem: Musculor/Skeletal Functional Status  Goal: Highest potential functional level  Outcome: Ongoing

## 2020-03-13 NOTE — PROGRESS NOTES
Attending Physician Statement  I have discussed the care of Cece Farmer, including pertinent history and exam findings with the resident. I have reviewed the key elements of all parts of the encounter with the resident. I have seen and examined the patient with the resident. I agree with the assessment and plan and status of the problem list as documented. I have seen the patient during my round today, I have reviewed the chart, lab seen and medications reviewed. Overnight events noted and she is gradually improving she is more alert less tachycardic and less anxious yesterday she had episodes of hypotension requiring fluid bolus overnight this morning her blood pressure is better. She has mild cough she did not have significant vomiting and she is able to tolerate liquid diet she still on TPN abdomen is soft mild tenderness present no rigidity she is tolerating clear liquid. She still on high flow nasal cannula 50% / 25 L and continued to have suboptimal effort for any incentive spirometry. Her WBC count is better and decreasing to 13 hemoglobin though dropped to 7.3. Continue with clear liquid and TPN. Continue with Klonopin, BuSpar, gabapentin, trazodone and Lexapro. We will hold beta-blocker if she is hypotensive. Continue effort for incentive spirometry deep breathing and out of bed to chair. We will transfer to stepdown unit and will follow as pulmonary    Total critical care time caring for this patient with life threatening, unstable organ failure, including direct patient contact, management of life support systems, review of data including imaging and labs, discussions with other team members and physicians at least 27  Min so far today, excluding procedures. Please note that this chart was generated using voice recognition Dragon dictation software.  Although every effort was made to ensure the accuracy of this automated transcription, some errors in transcription may have

## 2020-03-13 NOTE — PROGRESS NOTES
03/12/20  0459 03/13/20  0549   WBC 23.9* 15.4* 13.1*   HGB 9.7* 8.1* 7.3*   MCV 99.7 103.9* 102.6    266 301   RBC 3.08* 2.55* 2.31*   HCT 30.7* 26.5* 23.7*   MCH 31.5 31.8 31.6   MCHC 31.6 30.6 30.8   RDW 15.1* 15.2* 15.4*   MPV 12.4 11.8 12.1        PT/INR:    Lab Results   Component Value Date    PROTIME 11.0 02/10/2020    INR 1.0 02/10/2020     PTT:  No results found for: APTT, PTT    Basal Metabolic Profile:   Recent Labs     03/11/20  1117 03/12/20  0459 03/13/20  0549    137 139   K 3.9 3.8 3.9   BUN 24* 24* 27*   CREATININE 0.70 0.67 0.63    102 106   CO2 24 25 25      Magnesium:   Lab Results   Component Value Date    MG 1.9 03/13/2020    MG 2.0 03/11/2020    MG 2.0 03/09/2020     Phosphorus:   Lab Results   Component Value Date    PHOS 3.3 03/13/2020    PHOS 3.5 03/11/2020    PHOS 3.6 03/09/2020     S. Calcium:  Recent Labs     03/13/20  0549   CALCIUM 7.9*     S. Ionized Calcium:No results for input(s): IONCA in the last 72 hours. Urinalysis:   Lab Results   Component Value Date    NITRU NEGATIVE 02/29/2020    COLORU YELLOW 02/29/2020    PHUR 6.0 02/29/2020    WBCUA None 02/29/2020    RBCUA 2 TO 5 02/29/2020    MUCUS NOT REPORTED 02/29/2020    TRICHOMONAS NOT REPORTED 02/29/2020    YEAST NOT REPORTED 02/29/2020    BACTERIA NOT REPORTED 02/29/2020    CLARITYU Clear 06/29/2018    SPECGRAV 1.059 02/29/2020    LEUKOCYTESUR NEGATIVE 02/29/2020    UROBILINOGEN Normal 02/29/2020    BILIRUBINUR NEGATIVE 02/29/2020    BILIRUBINUR Negative 06/29/2018    BLOODU Non-hemolyzed trace 06/29/2018    GLUCOSEU NEGATIVE 02/29/2020    KETUA NEGATIVE 02/29/2020    AMORPHOUS NOT REPORTED 02/29/2020       CARDIAC ENZYMES: No results for input(s): CKMB, CKMBINDEX, TROPONINI in the last 72 hours. Invalid input(s): CKTOTAL;3  BNP: No results for input(s): BNP in the last 72 hours.     LFTS  Recent Labs     03/12/20  0459   ALKPHOS 94   *   AST 63*   BILITOT 0.30   LABALBU 1.8* bed to chair.     We will transfer to stepdown unit and will follow as pulmonary     Total critical care time caring for this patient with life threatening, unstable organ failure, including direct patient contact, management of life support systems, review of data including imaging and labs, discussions with other team members and physicians at least 27  Min so far today, excluding procedures.        Please note that this chart was generated using voice recognition Dragon dictation software. Although every effort was made to ensure the accuracy of this automated transcription, some errors in transcription may have occurred.         Bakari Mora MD  3/13/2020 8:20 PM

## 2020-03-14 ENCOUNTER — APPOINTMENT (OUTPATIENT)
Dept: GENERAL RADIOLOGY | Age: 75
DRG: 004 | End: 2020-03-14
Attending: INTERNAL MEDICINE
Payer: MEDICARE

## 2020-03-14 LAB
ABSOLUTE EOS #: 0.52 K/UL (ref 0–0.4)
ABSOLUTE IMMATURE GRANULOCYTE: 0.65 K/UL (ref 0–0.3)
ABSOLUTE LYMPH #: 1.43 K/UL (ref 1–4.8)
ABSOLUTE MONO #: 0.91 K/UL (ref 0.1–0.8)
ANION GAP SERPL CALCULATED.3IONS-SCNC: 10 MMOL/L (ref 9–17)
BASOPHILS # BLD: 0 % (ref 0–2)
BASOPHILS ABSOLUTE: 0 K/UL (ref 0–0.2)
BLOOD BANK SPECIMEN: NORMAL
BUN BLDV-MCNC: 26 MG/DL (ref 8–23)
BUN/CREAT BLD: ABNORMAL (ref 9–20)
CALCIUM SERPL-MCNC: 8 MG/DL (ref 8.6–10.4)
CHLORIDE BLD-SCNC: 104 MMOL/L (ref 98–107)
CO2: 24 MMOL/L (ref 20–31)
CREAT SERPL-MCNC: 0.71 MG/DL (ref 0.5–0.9)
DIFFERENTIAL TYPE: ABNORMAL
EOSINOPHILS RELATIVE PERCENT: 4 % (ref 1–4)
GFR AFRICAN AMERICAN: >60 ML/MIN
GFR NON-AFRICAN AMERICAN: >60 ML/MIN
GFR SERPL CREATININE-BSD FRML MDRD: ABNORMAL ML/MIN/{1.73_M2}
GFR SERPL CREATININE-BSD FRML MDRD: ABNORMAL ML/MIN/{1.73_M2}
GLUCOSE BLD-MCNC: 128 MG/DL (ref 65–105)
GLUCOSE BLD-MCNC: 132 MG/DL (ref 65–105)
GLUCOSE BLD-MCNC: 135 MG/DL (ref 65–105)
GLUCOSE BLD-MCNC: 137 MG/DL (ref 70–99)
GLUCOSE BLD-MCNC: 141 MG/DL (ref 65–105)
GLUCOSE BLD-MCNC: 146 MG/DL (ref 65–105)
HCT VFR BLD CALC: 20.8 % (ref 36.3–47.1)
HCT VFR BLD CALC: 22.9 % (ref 36.3–47.1)
HCT VFR BLD CALC: 25.2 % (ref 36.3–47.1)
HEMOGLOBIN: 6.3 G/DL (ref 11.9–15.1)
HEMOGLOBIN: 7.1 G/DL (ref 11.9–15.1)
HEMOGLOBIN: 7.8 G/DL (ref 11.9–15.1)
IMMATURE GRANULOCYTES: 5 %
LYMPHOCYTES # BLD: 11 % (ref 24–44)
MCH RBC QN AUTO: 31.1 PG (ref 25.2–33.5)
MCH RBC QN AUTO: 31.2 PG (ref 25.2–33.5)
MCHC RBC AUTO-ENTMCNC: 30.3 G/DL (ref 28.4–34.8)
MCHC RBC AUTO-ENTMCNC: 31 G/DL (ref 28.4–34.8)
MCV RBC AUTO: 100.4 FL (ref 82.6–102.9)
MCV RBC AUTO: 103 FL (ref 82.6–102.9)
MONOCYTES # BLD: 7 % (ref 1–7)
MORPHOLOGY: ABNORMAL
MORPHOLOGY: ABNORMAL
NRBC AUTOMATED: 0.5 PER 100 WBC
NRBC AUTOMATED: 1.2 PER 100 WBC
NUCLEATED RED BLOOD CELLS: 1 PER 100 WBC
PDW BLD-RTO: 15.4 % (ref 11.8–14.4)
PDW BLD-RTO: 15.6 % (ref 11.8–14.4)
PLATELET # BLD: 284 K/UL (ref 138–453)
PLATELET # BLD: 307 K/UL (ref 138–453)
PLATELET ESTIMATE: ABNORMAL
PMV BLD AUTO: 11.5 FL (ref 8.1–13.5)
PMV BLD AUTO: 12.5 FL (ref 8.1–13.5)
POTASSIUM SERPL-SCNC: 4.3 MMOL/L (ref 3.7–5.3)
RBC # BLD: 2.02 M/UL (ref 3.95–5.11)
RBC # BLD: 2.28 M/UL (ref 3.95–5.11)
RBC # BLD: ABNORMAL 10*6/UL
SEG NEUTROPHILS: 73 % (ref 36–66)
SEGMENTED NEUTROPHILS ABSOLUTE COUNT: 9.49 K/UL (ref 1.8–7.7)
SODIUM BLD-SCNC: 138 MMOL/L (ref 135–144)
WBC # BLD: 13 K/UL (ref 3.5–11.3)
WBC # BLD: 13.1 K/UL (ref 3.5–11.3)
WBC # BLD: ABNORMAL 10*3/UL

## 2020-03-14 PROCEDURE — 97535 SELF CARE MNGMENT TRAINING: CPT

## 2020-03-14 PROCEDURE — 2580000003 HC RX 258: Performed by: STUDENT IN AN ORGANIZED HEALTH CARE EDUCATION/TRAINING PROGRAM

## 2020-03-14 PROCEDURE — 6370000000 HC RX 637 (ALT 250 FOR IP): Performed by: STUDENT IN AN ORGANIZED HEALTH CARE EDUCATION/TRAINING PROGRAM

## 2020-03-14 PROCEDURE — 2700000000 HC OXYGEN THERAPY PER DAY

## 2020-03-14 PROCEDURE — 85025 COMPLETE CBC W/AUTO DIFF WBC: CPT

## 2020-03-14 PROCEDURE — 94640 AIRWAY INHALATION TREATMENT: CPT

## 2020-03-14 PROCEDURE — 85018 HEMOGLOBIN: CPT

## 2020-03-14 PROCEDURE — 6360000002 HC RX W HCPCS: Performed by: STUDENT IN AN ORGANIZED HEALTH CARE EDUCATION/TRAINING PROGRAM

## 2020-03-14 PROCEDURE — P9040 RBC LEUKOREDUCED IRRADIATED: HCPCS

## 2020-03-14 PROCEDURE — 2000000000 HC ICU R&B

## 2020-03-14 PROCEDURE — 86901 BLOOD TYPING SEROLOGIC RH(D): CPT

## 2020-03-14 PROCEDURE — 86920 COMPATIBILITY TEST SPIN: CPT

## 2020-03-14 PROCEDURE — 85027 COMPLETE CBC AUTOMATED: CPT

## 2020-03-14 PROCEDURE — 82947 ASSAY GLUCOSE BLOOD QUANT: CPT

## 2020-03-14 PROCEDURE — 36415 COLL VENOUS BLD VENIPUNCTURE: CPT

## 2020-03-14 PROCEDURE — 2500000003 HC RX 250 WO HCPCS: Performed by: STUDENT IN AN ORGANIZED HEALTH CARE EDUCATION/TRAINING PROGRAM

## 2020-03-14 PROCEDURE — 94770 HC ETCO2 MONITOR DAILY: CPT

## 2020-03-14 PROCEDURE — 86900 BLOOD TYPING SEROLOGIC ABO: CPT

## 2020-03-14 PROCEDURE — 6360000002 HC RX W HCPCS: Performed by: NURSE PRACTITIONER

## 2020-03-14 PROCEDURE — C9113 INJ PANTOPRAZOLE SODIUM, VIA: HCPCS | Performed by: STUDENT IN AN ORGANIZED HEALTH CARE EDUCATION/TRAINING PROGRAM

## 2020-03-14 PROCEDURE — 6360000002 HC RX W HCPCS: Performed by: HOSPITALIST

## 2020-03-14 PROCEDURE — 80048 BASIC METABOLIC PNL TOTAL CA: CPT

## 2020-03-14 PROCEDURE — 94761 N-INVAS EAR/PLS OXIMETRY MLT: CPT

## 2020-03-14 PROCEDURE — 74018 RADEX ABDOMEN 1 VIEW: CPT

## 2020-03-14 PROCEDURE — 85014 HEMATOCRIT: CPT

## 2020-03-14 PROCEDURE — 99232 SBSQ HOSP IP/OBS MODERATE 35: CPT | Performed by: HOSPITALIST

## 2020-03-14 PROCEDURE — 2580000003 HC RX 258: Performed by: NURSE PRACTITIONER

## 2020-03-14 PROCEDURE — 86850 RBC ANTIBODY SCREEN: CPT

## 2020-03-14 PROCEDURE — 99233 SBSQ HOSP IP/OBS HIGH 50: CPT | Performed by: INTERNAL MEDICINE

## 2020-03-14 RX ORDER — LORAZEPAM 2 MG/ML
1 INJECTION INTRAMUSCULAR ONCE
Status: COMPLETED | OUTPATIENT
Start: 2020-03-14 | End: 2020-03-14

## 2020-03-14 RX ORDER — LORAZEPAM 2 MG/ML
1 INJECTION INTRAMUSCULAR EVERY 6 HOURS PRN
Status: DISCONTINUED | OUTPATIENT
Start: 2020-03-14 | End: 2020-04-06 | Stop reason: HOSPADM

## 2020-03-14 RX ORDER — 0.9 % SODIUM CHLORIDE 0.9 %
250 INTRAVENOUS SOLUTION INTRAVENOUS ONCE
Status: COMPLETED | OUTPATIENT
Start: 2020-03-14 | End: 2020-03-14

## 2020-03-14 RX ADMIN — ONDANSETRON 4 MG: 2 INJECTION INTRAMUSCULAR; INTRAVENOUS at 04:14

## 2020-03-14 RX ADMIN — METOCLOPRAMIDE 10 MG: 5 INJECTION, SOLUTION INTRAMUSCULAR; INTRAVENOUS at 12:37

## 2020-03-14 RX ADMIN — Medication 10 ML: at 09:06

## 2020-03-14 RX ADMIN — SODIUM CHLORIDE, POTASSIUM CHLORIDE, SODIUM LACTATE AND CALCIUM CHLORIDE: 600; 310; 30; 20 INJECTION, SOLUTION INTRAVENOUS at 09:40

## 2020-03-14 RX ADMIN — Medication 10 ML: at 09:07

## 2020-03-14 RX ADMIN — POTASSIUM CHLORIDE: 2 INJECTION, SOLUTION, CONCENTRATE INTRAVENOUS at 18:28

## 2020-03-14 RX ADMIN — METOCLOPRAMIDE 10 MG: 5 INJECTION, SOLUTION INTRAMUSCULAR; INTRAVENOUS at 18:27

## 2020-03-14 RX ADMIN — ONDANSETRON 4 MG: 2 INJECTION INTRAMUSCULAR; INTRAVENOUS at 16:38

## 2020-03-14 RX ADMIN — LORAZEPAM 1 MG: 2 INJECTION INTRAMUSCULAR; INTRAVENOUS at 23:17

## 2020-03-14 RX ADMIN — I.V. FAT EMULSION 100 ML: 20 EMULSION INTRAVENOUS at 18:28

## 2020-03-14 RX ADMIN — PANTOPRAZOLE SODIUM 40 MG: 40 INJECTION, POWDER, FOR SOLUTION INTRAVENOUS at 09:07

## 2020-03-14 RX ADMIN — METOCLOPRAMIDE 10 MG: 5 INJECTION, SOLUTION INTRAMUSCULAR; INTRAVENOUS at 04:14

## 2020-03-14 RX ADMIN — METOCLOPRAMIDE 10 MG: 5 INJECTION, SOLUTION INTRAMUSCULAR; INTRAVENOUS at 06:15

## 2020-03-14 RX ADMIN — ACETAMINOPHEN 650 MG: 325 TABLET ORAL at 03:48

## 2020-03-14 RX ADMIN — LORAZEPAM 1 MG: 2 INJECTION INTRAMUSCULAR at 11:01

## 2020-03-14 RX ADMIN — IPRATROPIUM BROMIDE AND ALBUTEROL SULFATE 1 AMPULE: .5; 3 SOLUTION RESPIRATORY (INHALATION) at 15:46

## 2020-03-14 RX ADMIN — ONDANSETRON 4 MG: 2 INJECTION INTRAMUSCULAR; INTRAVENOUS at 10:16

## 2020-03-14 RX ADMIN — LORAZEPAM 1 MG: 2 INJECTION INTRAMUSCULAR at 19:01

## 2020-03-14 RX ADMIN — ONDANSETRON 4 MG: 2 INJECTION INTRAMUSCULAR; INTRAVENOUS at 23:14

## 2020-03-14 RX ADMIN — SODIUM CHLORIDE, PRESERVATIVE FREE 10 ML: 5 INJECTION INTRAVENOUS at 09:08

## 2020-03-14 RX ADMIN — SODIUM CHLORIDE 8 MG/HR: 9 INJECTION, SOLUTION INTRAVENOUS at 12:03

## 2020-03-14 RX ADMIN — SODIUM CHLORIDE 250 ML: 9 INJECTION, SOLUTION INTRAVENOUS at 09:56

## 2020-03-14 ASSESSMENT — PAIN DESCRIPTION - LOCATION: LOCATION: ABDOMEN

## 2020-03-14 ASSESSMENT — PAIN DESCRIPTION - PAIN TYPE: TYPE: CHRONIC PAIN

## 2020-03-14 ASSESSMENT — PAIN SCALES - GENERAL
PAINLEVEL_OUTOF10: 8
PAINLEVEL_OUTOF10: 8

## 2020-03-14 ASSESSMENT — PAIN DESCRIPTION - FREQUENCY: FREQUENCY: CONTINUOUS

## 2020-03-14 NOTE — PROGRESS NOTES
Surgery Progress Note            PATIENT NAME: Nikky Lara     TODAY'S DATE: 3/14/2020, 8:06 AM    SUBJECTIVE:    Pt seen and examined at bedside. States that she feels okay but has been throwing up - dark coffee ground like emesis. Hasn't passed a BM but feels like she needs to and wants something to help it pass. Vitals normal and stable. Hgb dropped to 6.3 this AM.      OBJECTIVE:   VITALS:  /61   Pulse 80   Temp 99.3 °F (37.4 °C) (Oral)   Resp 25   Ht 5' 2\" (1.575 m)   Wt 205 lb 4.8 oz (93.1 kg)   SpO2 91%   BMI 37.55 kg/m²      INTAKE/OUTPUT:      Intake/Output Summary (Last 24 hours) at 3/14/2020 0806  Last data filed at 3/14/2020 0641  Gross per 24 hour   Intake 3603.54 ml   Output 1275 ml   Net 2328.54 ml       PHYSICAL EXAM  Constitutional:  Alert and oriented  HEENT:      Head: Normocephalic. Atraumatic     Eyes: pupils are equal and reactive. Cardiovascular: Regular rate and rhythm  Pulmonary/Chest: No obvious respiratory distress, on high flow  Abdominal: Soft. Non distended, mild tenderness to palpation diffusely although no guarding or rebound tenderness  Musculoskeletal: equal ROM  Neurological: No gross motor deficits equally  Skin: Skin is warm, dry and intact. S/p hiatal hernia repair with syeda funduplication on 3/75/14  Pneumonia- AM CXR, Abx per primary  Anxiety  Anemia     Plan:  1. Continue TPN; Continue CLD  2. Monitor H/H - 6.3 this AM  1. Recommend PRBC  2. Likely will need EGD  3. Medical management per primary team.  4. Continue aggressive bowel regimen    Electronically signed by Morales Cuellar DO  on 3/14/2020 at 8:06 AM       General Surgery Attending     I have seen and examined this patient with Dr. Valeriano Ferreira, including review of pertinent history and exam findings, labs, imaging, vitals. I have performed the key elements of the encounter.  I agree with the assessment, plan, and orders as documented by the surgical resident and any pertinent changes or updates have been

## 2020-03-14 NOTE — PROGRESS NOTES
hour   Intake 3833.54 ml   Output 740 ml   Net 3093.54 ml       Labs:  Hematology:  Recent Labs     03/12/20  0459 03/13/20  0549 03/14/20  0544 03/14/20  1439   WBC 15.4* 13.1* 13.0*  --    RBC 2.55* 2.31* 2.02*  --    HGB 8.1* 7.3* 6.3* 7.8*   HCT 26.5* 23.7* 20.8* 25.2*   .9* 102.6 103.0*  --    MCH 31.8 31.6 31.2  --    MCHC 30.6 30.8 30.3  --    RDW 15.2* 15.4* 15.6*  --     301 307  --    MPV 11.8 12.1 12.5  --      Chemistry:  Recent Labs     03/12/20  0459 03/13/20  0549 03/14/20  1624    139 138   K 3.8 3.9 4.3    106 104   CO2 25 25 24   GLUCOSE 134* 131* 137*   BUN 24* 27* 26*   CREATININE 0.67 0.63 0.71   MG  --  1.9  --    ANIONGAP 10 8* 10   LABGLOM >60 >60 >60   GFRAA >60 >60 >60   CALCIUM 7.9* 7.9* 8.0*   PHOS  --  3.3  --      Recent Labs     03/12/20  0459 03/12/20  1530 03/13/20  1054 03/14/20  0554 03/14/20  0736 03/14/20  1101 03/14/20  1555   PROT 4.5*  --   --   --   --   --   --    LABALBU 1.8*  --   --   --   --   --   --    AST 63*  --   --   --   --   --   --    *  --   --   --   --   --   --    ALKPHOS 94  --   --   --   --   --   --    BILITOT 0.30  --   --   --   --   --   --    POCGLU  --  138* 142* 135* 146* 141* 128*     ABG:  Lab Results   Component Value Date    POCPH 7.518 03/08/2020    POCPCO2 31.1 03/08/2020    POCPO2 64.3 03/08/2020    POCHCO3 25.2 03/08/2020    NBEA NOT REPORTED 03/08/2020    PBEA 3 03/08/2020    WTF1CFF 26 03/08/2020    IYDF9YNU 94 03/08/2020    FIO2 75.0 03/08/2020     Lab Results   Component Value Date/Time    SPECIAL 2ML LT HAND 02/29/2020 04:43 AM     Lab Results   Component Value Date/Time    CULTURE NO GROWTH 6 DAYS 02/29/2020 04:43 AM       Radiology:  Aneta Wesley Chest Portable    Result Date: 3/10/2020  Interval extubation. No significant change in bilateral airspace disease. Probable small effusions.        Physical Examination:        General appearance:  alert, cooperative and no distress  Mental Status:  oriented to person, place and time and normal affect  Lungs: Bilateral crackles  Heart:  regular rate and rhythm, no murmur  Abdomen: Dominant is soft. No distention. Generalized tenderness noted. Extremities:  no edema, redness, tenderness in the calves  Skin:  no gross lesions, rashes, induration    Assessment:        Hospital Problems           Last Modified POA    Aspiration pneumonia ([address] 2/29/2020 Yes    Centrilobular emphysema ([address] 2/29/2020 Yes    Atelectasis 2/29/2020 Yes    Pleural effusion 2/29/2020 Yes    Esophageal dilatation 2/29/2020 Yes          Plan:        1. Acute respiratory failure -patient was intubated and stayed in ICU for extended period of time. Currently on high flow oxygen. 2. Coffee-ground emesis -patient had another episode this morning. Continue Protonix drip. She had PRBC transfusion. Repeat CBC is stable. Patient is hemodynamically stable. 3. Depression anxiety -stable for now. Ativan IV as needed. 4. Centrilobular emphysema -stable. Management per pulmonology. 5. Bilateral lower lobe consolidation with possible aspiration -improving. Continue antibiotics. 6. Status post laparoscopic hiatal hernia repair and Nissen fundoplication on 6/76/8219 -general surgery is following. Management per surgery.     Leia Avilez DO  3/14/2020  6:40 PM

## 2020-03-14 NOTE — PROGRESS NOTES
goal 3: demo CGA for functional transfers/ mobility with use of LRD and good safety awareness during functional activities   Short term goal 4: demo increased activity tolerance of of 20+ min to assist with ADL/ functional activities   Short term goal 5: demo understanding and I use of ECWS, fall prevention and proper pursed lipped breathing tech during functional activities   Patient Goals   Patient goals : to move better       Therapy Time   Individual Concurrent Group Co-treatment   Time In  14:40         Time Out  15:40         Minutes  60             Time Code min:60 min    NIRAJ Cruz/L

## 2020-03-14 NOTE — H&P
Neuro Critical Care Sign Out to Internal Medicine      Date and time: 3/13/2020 8:43 PM  Patient's name:  Nelson Reilly  Medical Record Number: [id number]  Patient's account/billing number: [de-identified]  Patient's YOB: 1945  Age: 76 y.o. Date of Admission: 2/29/2020 10:37 AM  Length of stay during current admission: 13    Primary Care Physician: Jean Calvin MD    Code Status: Full Code    Mode of physician to physician communication:        [x] Via telephone   [] In person     Date and time of sign-out: 3/13/2020 8:43 PM    Accepting Internal Medicine Attending: Dr. Covarrubias Expose    Accepting Medicine Team: Intermed      Patient's current ICU Bed:  116    Patient's assigned bed on floor:  402        [] Med-Surg Monitored [x] Step-down       [] Psychiatry ICU       [] Psych floor     Reason for ICU admission:     ACUTE RESPIRATORY FAILURE; UPPER GI BLEED    ICU course summary:     Nelson Reilly is a 76 y. o. with PMH of presentation to [facility] from home after d/c [facility] from [facility]'s s/p nissen fundoplication on 4/24/15 with bariatrics by Dr. Shawn Pascual. Pt came with worsening SOB since d/c, complaint of dark red emesis this morning. At outside hospital she was placed on Bipap after arriving with O2 sats 60%. Bp and HR were stable, but pt was quite dependent on bipap to maintain sat. CT-PE showed bilateral pleural effusions, no PE. Pt was afebrile, WBC 9.7; hg 13.2; creatine slightly over 1. Was started on vanc, levaquin, clindamycin due to penicillin allergy. Blood cultures were taken and the pt was transferred to [facility]'s ICU for impending resp failure with surgery consulted. On hospital day 1, pt mentating well this morning, wore BiPAP overnight with high flow nasal cannula this morning, not tachypneic saturating well on 40% FiO2. Pt subsequently developed respiratory distress, intubated on HOD 1 due to concern for airway protection. Clindamycin/aztreonam, supportive care was continued.   Pt started on assessment:    1. Recommended Follow-up:     1. Status post intubated on 3/1/2020 due to high O2 requirment and to prevent resp failure and risk of aspiration. Extubated on 3/6/2020. off of precedex ggt.  Currently on high flow oxygen fio2 50% 25 L  2. coffee ground color emesis. Improved. Hemoglobin 7.3 down trending from 9.7. continue Iv Protonix bid. Scopolamine patch per gen surgery. Okay for sips of water. Continued on TPN. Bowel regimen. Continue Reglan 10 Q 6 h  3. Severe anxiety with panic attacks. Off of Precedex drip. Continue home dose BuSpar 20, Lexapro 20, clonopin 0.5 bid, amitriptyline 10 (on hold, will confirm the home dose)   4. Insomnia. Continue trazodone 100 nightly  5. Bilateral lower lobe consolidation with possible aspiration. Completed antibiotic course I.e IV aztreonam and clindamycin. Stopped po prednisone because she said she is not tolerating. Continue duonebs, RT aerosol protocol. 6. Centrilobular emphysema. Continue as needed DuoNeb and RT aerosol protocol  7. Status post laparoscopic hiatal hernia repair and Nissen fundoplication on 0/91/1820. LXVUZSU following. 8. Mild BENEDICT likely due to hypotension. Resolve  9. Leukocytosis likely reactive. Improving. Clinically afebrile and no signs of infection.           Above mentioned assessment and plan was discussed by me with the admitting medicine resident. The medicine team assigned to the patient by medicine admitting resident will be following up the patient from now onwards on the floor.      Karl Gillespie MD  Neuro Critical Care  3/13/2020, 8:43 PM

## 2020-03-14 NOTE — PROGRESS NOTES
acetaminophen, polyethylene glycol, glycerin moisturizing mouth spray, metoprolol, ondansetron    Objective:    CBC:   Recent Labs     03/12/20  0459 03/13/20  0549 03/14/20  0544   WBC 15.4* 13.1* 13.0*   HGB 8.1* 7.3* 6.3*    301 307     BMP:    Recent Labs     03/11/20  1117 03/12/20  0459 03/13/20  0549    137 139   K 3.9 3.8 3.9    102 106   CO2 24 25 25   BUN 24* 24* 27*   CREATININE 0.70 0.67 0.63   GLUCOSE 139* 134* 131*     Calcium:  Recent Labs     03/13/20  0549   CALCIUM 7.9*     Ionized Calcium:No results for input(s): IONCA in the last 72 hours. Magnesium:  Recent Labs     03/13/20  0549   MG 1.9     Phosphorus:  Recent Labs     03/13/20  0549   PHOS 3.3     BNP:No results for input(s): BNP in the last 72 hours. Glucose:  Recent Labs     03/14/20  0554 03/14/20  0736 03/14/20  1101   POCGLU 135* 146* 141*     HgbA1C: No results for input(s): LABA1C in the last 72 hours. INR: No results for input(s): INR in the last 72 hours. Hepatic:   Recent Labs     03/12/20  0459   ALKPHOS 94   *   AST 63*   PROT 4.5*   BILITOT 0.30   LABALBU 1.8*     Amylase and Lipase:No results for input(s): LACTA, AMYLASE in the last 72 hours. Lactic Acid: No results for input(s): LACTA in the last 72 hours. CARDIAC ENZYMES:No results for input(s): CKTOTAL, CKMB, CKMBINDEX, TROPONINI in the last 72 hours. BNP: No results for input(s): BNP in the last 72 hours. Lipids: No results for input(s): CHOL, TRIG, HDL, LDLCALC in the last 72 hours. Invalid input(s): LDL  ABGs: No results found for: PH, PCO2, PO2, HCO3, O2SAT  Thyroid: No results found for: TSH   Urinalysis: No results for input(s): BACTERIA, BLOODU, CLARITYU, COLORU, PHUR, PROTEINU, RBCUA, SPECGRAV, BILIRUBINUR, NITRU, WBCUA, LEUKOCYTESUR, GLUCOSEU in the last 72 hours.     CULTURES:    CXR  Showed persistent diffuse bilateral airspace opacity suggesting pulmonary edema or multilobar pneumonia     CT chest showed multifocal pneumonia hypotensive and dropped hemoglobin to 6.3 and she received packed RBC, GI was consulted and general surgery already on the case. Her last blood pressure is 138/64. She is on Reglan and scopolamine patch per surgery service. She is alert and awake not in distress, and she is only slightly tachypneic and she is on high flow nasal cannula 40% 30 L and she was saturating 96% she has bilateral distant breath sounds slightly decreased at bases and mild crackles at bases poor respiratory effort. From pulmonary standpoint we will continue with bronchodilators no need for steroids, continue with high flow nasal cannula and wean as tolerated encourage ambulation cough and incentive spirometry. She has severe anxiety and she is on multiple medications but currently she is n.p.o. so her medications are on hold which include Klonopin, BuSpar, trazodone, Neurontin, Lexapro    Please note that this chart was generated using voice recognition Dragon dictation software. Although every effort was made to ensure the accuracy of this automated transcription, some errors in transcription may have occurred.     Chyna Arceo MD  3/14/2020 6:02 PM

## 2020-03-15 ENCOUNTER — APPOINTMENT (OUTPATIENT)
Dept: GENERAL RADIOLOGY | Age: 75
DRG: 004 | End: 2020-03-15
Attending: INTERNAL MEDICINE
Payer: MEDICARE

## 2020-03-15 LAB
ABSOLUTE EOS #: 0 K/UL (ref 0–0.4)
ABSOLUTE IMMATURE GRANULOCYTE: 0.49 K/UL (ref 0–0.3)
ABSOLUTE LYMPH #: 1.34 K/UL (ref 1–4.8)
ABSOLUTE MONO #: 0.85 K/UL (ref 0.1–0.8)
ANION GAP SERPL CALCULATED.3IONS-SCNC: 9 MMOL/L (ref 9–17)
BASOPHILS # BLD: 0 % (ref 0–2)
BASOPHILS ABSOLUTE: 0 K/UL (ref 0–0.2)
BUN BLDV-MCNC: 22 MG/DL (ref 8–23)
BUN/CREAT BLD: ABNORMAL (ref 9–20)
CALCIUM SERPL-MCNC: 7.8 MG/DL (ref 8.6–10.4)
CHLORIDE BLD-SCNC: 104 MMOL/L (ref 98–107)
CO2: 25 MMOL/L (ref 20–31)
CREAT SERPL-MCNC: 0.69 MG/DL (ref 0.5–0.9)
DIFFERENTIAL TYPE: ABNORMAL
EOSINOPHILS RELATIVE PERCENT: 0 % (ref 1–4)
GFR AFRICAN AMERICAN: >60 ML/MIN
GFR NON-AFRICAN AMERICAN: >60 ML/MIN
GFR SERPL CREATININE-BSD FRML MDRD: ABNORMAL ML/MIN/{1.73_M2}
GFR SERPL CREATININE-BSD FRML MDRD: ABNORMAL ML/MIN/{1.73_M2}
GLUCOSE BLD-MCNC: 139 MG/DL (ref 70–99)
HCT VFR BLD CALC: 20.9 % (ref 36.3–47.1)
HCT VFR BLD CALC: 22.5 % (ref 36.3–47.1)
HCT VFR BLD CALC: 22.7 % (ref 36.3–47.1)
HCT VFR BLD CALC: 24.7 % (ref 36.3–47.1)
HCT VFR BLD CALC: 24.9 % (ref 36.3–47.1)
HCT VFR BLD CALC: 26.9 % (ref 36.3–47.1)
HEMOGLOBIN: 6.5 G/DL (ref 11.9–15.1)
HEMOGLOBIN: 7 G/DL (ref 11.9–15.1)
HEMOGLOBIN: 7 G/DL (ref 11.9–15.1)
HEMOGLOBIN: 7.8 G/DL (ref 11.9–15.1)
HEMOGLOBIN: 7.9 G/DL (ref 11.9–15.1)
HEMOGLOBIN: 8.4 G/DL (ref 11.9–15.1)
IMMATURE GRANULOCYTES: 4 %
LYMPHOCYTES # BLD: 11 % (ref 24–44)
MCH RBC QN AUTO: 31.5 PG (ref 25.2–33.5)
MCH RBC QN AUTO: 31.8 PG (ref 25.2–33.5)
MCHC RBC AUTO-ENTMCNC: 30.8 G/DL (ref 28.4–34.8)
MCHC RBC AUTO-ENTMCNC: 31.1 G/DL (ref 28.4–34.8)
MCV RBC AUTO: 102.3 FL (ref 82.6–102.9)
MCV RBC AUTO: 102.3 FL (ref 82.6–102.9)
MONOCYTES # BLD: 7 % (ref 1–7)
MORPHOLOGY: ABNORMAL
NRBC AUTOMATED: 0.6 PER 100 WBC
NRBC AUTOMATED: 0.9 PER 100 WBC
NUCLEATED RED BLOOD CELLS: 1 PER 100 WBC
PDW BLD-RTO: 15.5 % (ref 11.8–14.4)
PDW BLD-RTO: 15.8 % (ref 11.8–14.4)
PLATELET # BLD: 288 K/UL (ref 138–453)
PLATELET # BLD: 289 K/UL (ref 138–453)
PLATELET ESTIMATE: ABNORMAL
PMV BLD AUTO: 11.3 FL (ref 8.1–13.5)
PMV BLD AUTO: 12 FL (ref 8.1–13.5)
POTASSIUM SERPL-SCNC: 4.1 MMOL/L (ref 3.7–5.3)
RBC # BLD: 2.2 M/UL (ref 3.95–5.11)
RBC # BLD: 2.22 M/UL (ref 3.95–5.11)
RBC # BLD: ABNORMAL 10*6/UL
SEG NEUTROPHILS: 78 % (ref 36–66)
SEGMENTED NEUTROPHILS ABSOLUTE COUNT: 9.52 K/UL (ref 1.8–7.7)
SODIUM BLD-SCNC: 138 MMOL/L (ref 135–144)
WBC # BLD: 12 K/UL (ref 3.5–11.3)
WBC # BLD: 12.2 K/UL (ref 3.5–11.3)
WBC # BLD: ABNORMAL 10*3/UL

## 2020-03-15 PROCEDURE — 2500000003 HC RX 250 WO HCPCS: Performed by: STUDENT IN AN ORGANIZED HEALTH CARE EDUCATION/TRAINING PROGRAM

## 2020-03-15 PROCEDURE — 85018 HEMOGLOBIN: CPT

## 2020-03-15 PROCEDURE — 80048 BASIC METABOLIC PNL TOTAL CA: CPT

## 2020-03-15 PROCEDURE — 94640 AIRWAY INHALATION TREATMENT: CPT

## 2020-03-15 PROCEDURE — 6360000002 HC RX W HCPCS: Performed by: HOSPITALIST

## 2020-03-15 PROCEDURE — 6360000002 HC RX W HCPCS: Performed by: STUDENT IN AN ORGANIZED HEALTH CARE EDUCATION/TRAINING PROGRAM

## 2020-03-15 PROCEDURE — 99291 CRITICAL CARE FIRST HOUR: CPT | Performed by: INTERNAL MEDICINE

## 2020-03-15 PROCEDURE — 6370000000 HC RX 637 (ALT 250 FOR IP): Performed by: STUDENT IN AN ORGANIZED HEALTH CARE EDUCATION/TRAINING PROGRAM

## 2020-03-15 PROCEDURE — 94760 N-INVAS EAR/PLS OXIMETRY 1: CPT

## 2020-03-15 PROCEDURE — 85027 COMPLETE CBC AUTOMATED: CPT

## 2020-03-15 PROCEDURE — 85025 COMPLETE CBC W/AUTO DIFF WBC: CPT

## 2020-03-15 PROCEDURE — 51798 US URINE CAPACITY MEASURE: CPT

## 2020-03-15 PROCEDURE — 51701 INSERT BLADDER CATHETER: CPT

## 2020-03-15 PROCEDURE — 94761 N-INVAS EAR/PLS OXIMETRY MLT: CPT

## 2020-03-15 PROCEDURE — 51702 INSERT TEMP BLADDER CATH: CPT

## 2020-03-15 PROCEDURE — 36415 COLL VENOUS BLD VENIPUNCTURE: CPT

## 2020-03-15 PROCEDURE — C9113 INJ PANTOPRAZOLE SODIUM, VIA: HCPCS | Performed by: STUDENT IN AN ORGANIZED HEALTH CARE EDUCATION/TRAINING PROGRAM

## 2020-03-15 PROCEDURE — 2580000003 HC RX 258: Performed by: STUDENT IN AN ORGANIZED HEALTH CARE EDUCATION/TRAINING PROGRAM

## 2020-03-15 PROCEDURE — 2000000000 HC ICU R&B

## 2020-03-15 PROCEDURE — 85014 HEMATOCRIT: CPT

## 2020-03-15 PROCEDURE — P9016 RBC LEUKOCYTES REDUCED: HCPCS

## 2020-03-15 PROCEDURE — 6370000000 HC RX 637 (ALT 250 FOR IP): Performed by: SURGERY

## 2020-03-15 PROCEDURE — 71045 X-RAY EXAM CHEST 1 VIEW: CPT

## 2020-03-15 PROCEDURE — 86900 BLOOD TYPING SEROLOGIC ABO: CPT

## 2020-03-15 PROCEDURE — 2700000000 HC OXYGEN THERAPY PER DAY

## 2020-03-15 RX ORDER — FENTANYL CITRATE 50 UG/ML
25 INJECTION, SOLUTION INTRAMUSCULAR; INTRAVENOUS ONCE
Status: COMPLETED | OUTPATIENT
Start: 2020-03-15 | End: 2020-03-15

## 2020-03-15 RX ORDER — 0.9 % SODIUM CHLORIDE 0.9 %
20 INTRAVENOUS SOLUTION INTRAVENOUS ONCE
Status: COMPLETED | OUTPATIENT
Start: 2020-03-15 | End: 2020-03-15

## 2020-03-15 RX ORDER — 0.9 % SODIUM CHLORIDE 0.9 %
20 INTRAVENOUS SOLUTION INTRAVENOUS ONCE
Status: DISCONTINUED | OUTPATIENT
Start: 2020-03-15 | End: 2020-03-27

## 2020-03-15 RX ORDER — DEXTROSE, SODIUM CHLORIDE, AND POTASSIUM CHLORIDE 5; .45; .15 G/100ML; G/100ML; G/100ML
INJECTION INTRAVENOUS CONTINUOUS
Status: DISCONTINUED | OUTPATIENT
Start: 2020-03-15 | End: 2020-03-17

## 2020-03-15 RX ADMIN — IPRATROPIUM BROMIDE AND ALBUTEROL SULFATE 1 AMPULE: .5; 3 SOLUTION RESPIRATORY (INHALATION) at 11:56

## 2020-03-15 RX ADMIN — METOCLOPRAMIDE 10 MG: 5 INJECTION, SOLUTION INTRAMUSCULAR; INTRAVENOUS at 17:39

## 2020-03-15 RX ADMIN — IPRATROPIUM BROMIDE AND ALBUTEROL SULFATE 1 AMPULE: .5; 3 SOLUTION RESPIRATORY (INHALATION) at 20:09

## 2020-03-15 RX ADMIN — ONDANSETRON 4 MG: 2 INJECTION INTRAMUSCULAR; INTRAVENOUS at 06:13

## 2020-03-15 RX ADMIN — BISACODYL 10 MG: 10 SUPPOSITORY RECTAL at 08:52

## 2020-03-15 RX ADMIN — METOCLOPRAMIDE 10 MG: 5 INJECTION, SOLUTION INTRAMUSCULAR; INTRAVENOUS at 01:00

## 2020-03-15 RX ADMIN — LORAZEPAM 1 MG: 2 INJECTION INTRAMUSCULAR at 16:59

## 2020-03-15 RX ADMIN — SODIUM CHLORIDE, POTASSIUM CHLORIDE, SODIUM LACTATE AND CALCIUM CHLORIDE: 600; 310; 30; 20 INJECTION, SOLUTION INTRAVENOUS at 03:17

## 2020-03-15 RX ADMIN — METOCLOPRAMIDE 10 MG: 5 INJECTION, SOLUTION INTRAMUSCULAR; INTRAVENOUS at 12:01

## 2020-03-15 RX ADMIN — SODIUM CHLORIDE 8 MG/HR: 9 INJECTION, SOLUTION INTRAVENOUS at 02:21

## 2020-03-15 RX ADMIN — I.V. FAT EMULSION 100 ML: 20 EMULSION INTRAVENOUS at 17:36

## 2020-03-15 RX ADMIN — LORAZEPAM 1 MG: 2 INJECTION INTRAMUSCULAR at 23:51

## 2020-03-15 RX ADMIN — POTASSIUM CHLORIDE, DEXTROSE MONOHYDRATE AND SODIUM CHLORIDE 100 ML/HR: 150; 5; 450 INJECTION, SOLUTION INTRAVENOUS at 08:54

## 2020-03-15 RX ADMIN — ACETAMINOPHEN 650 MG: 650 SUPPOSITORY RECTAL at 15:47

## 2020-03-15 RX ADMIN — POTASSIUM CHLORIDE: 2 INJECTION, SOLUTION, CONCENTRATE INTRAVENOUS at 17:36

## 2020-03-15 RX ADMIN — ONDANSETRON 4 MG: 2 INJECTION INTRAMUSCULAR; INTRAVENOUS at 20:33

## 2020-03-15 RX ADMIN — METOCLOPRAMIDE 10 MG: 5 INJECTION, SOLUTION INTRAMUSCULAR; INTRAVENOUS at 06:15

## 2020-03-15 RX ADMIN — LORAZEPAM 1 MG: 2 INJECTION INTRAMUSCULAR at 10:30

## 2020-03-15 RX ADMIN — IPRATROPIUM BROMIDE AND ALBUTEROL SULFATE 1 AMPULE: .5; 3 SOLUTION RESPIRATORY (INHALATION) at 15:20

## 2020-03-15 RX ADMIN — SODIUM CHLORIDE 8 MG/HR: 9 INJECTION, SOLUTION INTRAVENOUS at 12:40

## 2020-03-15 RX ADMIN — ONDANSETRON 4 MG: 2 INJECTION INTRAMUSCULAR; INTRAVENOUS at 12:03

## 2020-03-15 RX ADMIN — SODIUM CHLORIDE 20 ML: 9 INJECTION, SOLUTION INTRAVENOUS at 13:56

## 2020-03-15 RX ADMIN — FENTANYL CITRATE 25 MCG: 50 INJECTION, SOLUTION INTRAMUSCULAR; INTRAVENOUS at 21:14

## 2020-03-15 RX ADMIN — IPRATROPIUM BROMIDE AND ALBUTEROL SULFATE 1 AMPULE: .5; 3 SOLUTION RESPIRATORY (INHALATION) at 08:18

## 2020-03-15 RX ADMIN — SODIUM CHLORIDE 8 MG/HR: 9 INJECTION, SOLUTION INTRAVENOUS at 22:13

## 2020-03-15 RX ADMIN — LORAZEPAM 1 MG: 2 INJECTION INTRAMUSCULAR at 04:30

## 2020-03-15 ASSESSMENT — PAIN SCALES - GENERAL
PAINLEVEL_OUTOF10: 8

## 2020-03-15 ASSESSMENT — PAIN DESCRIPTION - LOCATION
LOCATION: ABDOMEN
LOCATION: ABDOMEN
LOCATION: ABDOMEN;CHEST;KNEE

## 2020-03-15 ASSESSMENT — PAIN DESCRIPTION - FREQUENCY
FREQUENCY: CONTINUOUS

## 2020-03-15 ASSESSMENT — PAIN DESCRIPTION - PAIN TYPE
TYPE: ACUTE PAIN;CHRONIC PAIN
TYPE: CHRONIC PAIN;ACUTE PAIN
TYPE: ACUTE PAIN;CHRONIC PAIN

## 2020-03-15 ASSESSMENT — PAIN DESCRIPTION - PROGRESSION
CLINICAL_PROGRESSION: NOT CHANGED

## 2020-03-15 ASSESSMENT — PAIN DESCRIPTION - ORIENTATION: ORIENTATION: RIGHT;LEFT

## 2020-03-15 ASSESSMENT — PAIN DESCRIPTION - ONSET
ONSET: ON-GOING

## 2020-03-15 NOTE — PROGRESS NOTES
NG tube placed with Dr Mcdonough Leader and no complications. Intermittent suction on and 350ml of output. Patient tolerated . Family called to check on patient and had many concerns about mothers health. Writer did contact attending as  family requested a call since they are not allowed to visit at this time. Writer contacted primary and voiced concerns that family had and would like to be contacted.

## 2020-03-15 NOTE — PROGRESS NOTES
Order received to transfer patient to [facility]. Writer called report as well as updated family.

## 2020-03-15 NOTE — PROGRESS NOTES
by the surgical resident and any pertinent changes or updates have been made. NGT with nonbloody output. Exam stable. Labs noted. Spoke with Dr. Will Bradshaw today to give him update. EGD possibly tomorrow or this week pending hemoglobin trend. She got one unit PRBC yesterday. Decrease total fluids to 100 ml/hr. Continue PPI drip. Likely will get another PRBC unit today by medicine per report. Patient updated on everything and agreeable to plans.    Dr. Anju Lutz

## 2020-03-15 NOTE — PROGRESS NOTES
Education Not Indicated    Nutrition Evaluation:   · Evaluation: Goal achieved   · Goals: meet % of estimated nutrition needs    · Monitoring: PN Intake, PN Tolerance, Pertinent Labs, Wound Healing, Monitor Bowel Function      Electronically signed by Jose Arshad RD, LD on 3/15/20 at 2:38 PM EDT    Contact Number: [phone]

## 2020-03-15 NOTE — PROGRESS NOTES
Critical Care Team - Daily Progress Note      Date and time: 3/15/2020 2:59 AM  Patient's name:  Lindsey Mosher  Medical Record Number: [id number]  Patient's account/billing number: [de-identified]  Patient's YOB: 1945  Age: 76 y.o. Date of Admission: 2/29/2020 10:37 AM  Length of stay during current admission: 15      Primary Care Physician: Nasir Headley MD  ICU Attending Physician: Dr. Angeles Liriano Status: Full Code    Reason for ICU admission: No chief complaint on file.         SUBJECTIVE:     OVERNIGHT EVENTS:       Patient was transferred again to ICU because of her upper GI bleed  Coffee ground emesis and changed to bright red hemetemesis  GI consulted  Started on Protonix  NG output 400- 450 aylin brown liquid content  Hemodynamically stable  Hb dropped to 6.3  TENDERNESS IN THE EPIGASTRIUM AND SUPRAPUBIC AREA  BLADDER SCAN SHOWING   T-max of 100.3 overnight      AWAKE & FOLLOWING COMMANDS:  [] No   [x] Yes    CURRENT VENTILATION STATUS:     [] Ventilator  [] BIPAP  [x] Nasal Cannula [] Room Air      IF INTUBATED, ET TUBE MARKING AT LOWER LIP:       cms    SECRETIONS Amount:  [] Small [] Moderate  [] Large  [x] None  Color:     [] White [] Colored  [] Bloody    SEDATION:  RAAS Score:  [] Propofol gtt  [] Versed gtt  [] Ativan gtt   [x] No Sedation    PARALYZED:  [x] No    [] Yes    DIARRHEA:                [x] No                [] Yes  (C. Difficile status: [] positive                                                                                                                       [] negative                                                                                                                     [] pending)    VASOPRESSORS:  [x] No    [] Yes    If yes -   [] Levophed       [] Dopamine     [] Vasopressin       [] Dobutamine  [] Phenylephrine         [] Epinephrine    CENTRAL LINES:     [x] No   [] Yes   (Date of Insertion:   )           If yes -     [] Right IJ     [] Left IJ [] Component Value Date    PHOS 3.3 03/13/2020    PHOS 3.5 03/11/2020    PHOS 3.6 03/09/2020     S. Calcium:  Recent Labs     03/14/20  1624   CALCIUM 8.0*     S. Ionized Calcium:No results for input(s): IONCA in the last 72 hours. Urinalysis:   Lab Results   Component Value Date    NITRU NEGATIVE 02/29/2020    COLORU YELLOW 02/29/2020    PHUR 6.0 02/29/2020    WBCUA None 02/29/2020    RBCUA 2 TO 5 02/29/2020    MUCUS NOT REPORTED 02/29/2020    TRICHOMONAS NOT REPORTED 02/29/2020    YEAST NOT REPORTED 02/29/2020    BACTERIA NOT REPORTED 02/29/2020    CLARITYU Clear 06/29/2018    SPECGRAV 1.059 02/29/2020    LEUKOCYTESUR NEGATIVE 02/29/2020    UROBILINOGEN Normal 02/29/2020    BILIRUBINUR NEGATIVE 02/29/2020    BILIRUBINUR Negative 06/29/2018    BLOODU Non-hemolyzed trace 06/29/2018    GLUCOSEU NEGATIVE 02/29/2020    KETUA NEGATIVE 02/29/2020    AMORPHOUS NOT REPORTED 02/29/2020       CARDIAC ENZYMES: No results for input(s): CKMB, CKMBINDEX, TROPONINI in the last 72 hours. Invalid input(s): CKTOTAL;3  BNP: No results for input(s): BNP in the last 72 hours. LFTS  Recent Labs     03/12/20  0459   ALKPHOS 94   *   AST 63*   BILITOT 0.30   LABALBU 1.8*       AMYLASE/LIPASE/AMMONIA  No results for input(s): AMYLASE, LIPASE, AMMONIA in the last 72 hours. Last 3 Blood Glucose:   Recent Labs     03/12/20  0459 03/13/20  0549 03/14/20  1624   GLUCOSE 134* 131* 137*      HgBA1c:  No results found for: LABA1C      TSH:  No results found for: TSH  ANEMIA STUDIES  No results for input(s): LABIRON, TIBC, FERRITIN, CVUJCSTU34, FOLATE, OCCULTBLD in the last 72 hours.             Cultures during this admission:     Blood cultures:                 [x] None drawn      [] Negative             []  Positive (Details:  )  Urine Culture:                   [x] None drawn      [] Negative             []  Positive (Details:  )  Sputum Culture:               [x] None drawn       [] Negative             []  Positive (Details:

## 2020-03-15 NOTE — PLAN OF CARE
Ongoing  3/14/2020 1923 by Nancy Amezcua RN  Outcome: Ongoing  Goal: Control of acute pain  Description: Control of acute pain  3/15/2020 0540 by Terry Beyer RN  Outcome: Ongoing  3/14/2020 1923 by Nancy Amezcua RN  Outcome: Ongoing  Goal: Control of chronic pain  Description: Control of chronic pain  3/15/2020 0540 by Terry Beyer RN  Outcome: Ongoing  3/14/2020 1923 by Nancy Amezcua RN  Outcome: Ongoing     Problem: Skin Integrity - Impaired:  Goal: Will show no infection signs and symptoms  Description: Will show no infection signs and symptoms  3/15/2020 0540 by Terry Beyer RN  Outcome: Ongoing  3/14/2020 1923 by Nancy Amezcua RN  Outcome: Ongoing  Goal: Absence of new skin breakdown  Description: Absence of new skin breakdown  3/15/2020 0540 by Terry Beyer RN  Outcome: Ongoing  3/14/2020 1923 by Nancy Amezcua RN  Outcome: Ongoing     Problem: Sleep Pattern Disturbance:  Goal: Appears well-rested  Description: Appears well-rested  3/15/2020 0540 by Terry Beyer RN  Outcome: Ongoing  3/14/2020 1923 by Nancy Amezcua RN  Outcome: Ongoing     Problem: Risk for Impaired Skin Integrity  Goal: Tissue integrity - skin and mucous membranes  Description: Structural intactness and normal physiological function of skin and  mucous membranes.   3/15/2020 0540 by Terry Beyer RN  Outcome: Ongoing  3/14/2020 1923 by Nancy Amezcua RN  Outcome: Ongoing     Problem: Falls - Risk of:  Goal: Will remain free from falls  Description: Will remain free from falls  3/15/2020 0540 by Terry Beyer RN  Outcome: Ongoing  3/14/2020 1923 by Nancy Amezcua RN  Outcome: Ongoing  Goal: Absence of physical injury  Description: Absence of physical injury  3/15/2020 0540 by Terry Beyer RN  Outcome: Ongoing  3/14/2020 1923 by Nancy Amezcua RN  Outcome: Ongoing     Problem: OXYGENATION/RESPIRATORY FUNCTION  Goal: Patient will maintain patent airway  3/15/2020 0540 by Terry Beyer RN  Outcome:

## 2020-03-15 NOTE — PROGRESS NOTES
Had a discussion with general surgeon resident. No intervention per general surgery except for monitoring of H&H. Planning for endoscopy by general surgery on Monday.     Kendrick Obando MD,  Internal Medicine Resident, PGY-2   [address]  3/15/2020,1:18 AM

## 2020-03-16 ENCOUNTER — APPOINTMENT (OUTPATIENT)
Dept: GENERAL RADIOLOGY | Age: 75
DRG: 004 | End: 2020-03-16
Attending: INTERNAL MEDICINE
Payer: MEDICARE

## 2020-03-16 LAB
ABO/RH: NORMAL
ABSOLUTE EOS #: 0.32 K/UL (ref 0–0.4)
ABSOLUTE IMMATURE GRANULOCYTE: 0.24 K/UL (ref 0–0.3)
ABSOLUTE LYMPH #: 1.3 K/UL (ref 1–4.8)
ABSOLUTE MONO #: 0.65 K/UL (ref 0.1–0.8)
ANION GAP SERPL CALCULATED.3IONS-SCNC: 10 MMOL/L (ref 9–17)
ANTIBODY SCREEN: NEGATIVE
ARM BAND NUMBER: NORMAL
BASOPHILS # BLD: 0 % (ref 0–2)
BASOPHILS ABSOLUTE: 0 K/UL (ref 0–0.2)
BLD PROD TYP BPU: NORMAL
BLD PROD TYP BPU: NORMAL
BUN BLDV-MCNC: 17 MG/DL (ref 8–23)
BUN/CREAT BLD: ABNORMAL (ref 9–20)
CALCIUM SERPL-MCNC: 7.8 MG/DL (ref 8.6–10.4)
CHLORIDE BLD-SCNC: 103 MMOL/L (ref 98–107)
CO2: 21 MMOL/L (ref 20–31)
CREAT SERPL-MCNC: 0.64 MG/DL (ref 0.5–0.9)
CROSSMATCH RESULT: NORMAL
CROSSMATCH RESULT: NORMAL
DIFFERENTIAL TYPE: ABNORMAL
DISPENSE STATUS BLOOD BANK: NORMAL
DISPENSE STATUS BLOOD BANK: NORMAL
EOSINOPHILS RELATIVE PERCENT: 4 % (ref 1–4)
EXPIRATION DATE: NORMAL
GFR AFRICAN AMERICAN: >60 ML/MIN
GFR NON-AFRICAN AMERICAN: >60 ML/MIN
GFR SERPL CREATININE-BSD FRML MDRD: ABNORMAL ML/MIN/{1.73_M2}
GFR SERPL CREATININE-BSD FRML MDRD: ABNORMAL ML/MIN/{1.73_M2}
GLUCOSE BLD-MCNC: 135 MG/DL (ref 70–99)
HCT VFR BLD CALC: 24.7 % (ref 36.3–47.1)
HCT VFR BLD CALC: 25.2 % (ref 36.3–47.1)
HCT VFR BLD CALC: 25.8 % (ref 36.3–47.1)
HEMOGLOBIN: 7.8 G/DL (ref 11.9–15.1)
HEMOGLOBIN: 7.9 G/DL (ref 11.9–15.1)
HEMOGLOBIN: 8.1 G/DL (ref 11.9–15.1)
IMMATURE GRANULOCYTES: 3 %
LYMPHOCYTES # BLD: 16 % (ref 24–44)
MAGNESIUM: 1.7 MG/DL (ref 1.6–2.6)
MCH RBC QN AUTO: 31.9 PG (ref 25.2–33.5)
MCHC RBC AUTO-ENTMCNC: 31.4 G/DL (ref 28.4–34.8)
MCV RBC AUTO: 101.6 FL (ref 82.6–102.9)
MONOCYTES # BLD: 8 % (ref 1–7)
MORPHOLOGY: ABNORMAL
NRBC AUTOMATED: 0.6 PER 100 WBC
PDW BLD-RTO: 15.9 % (ref 11.8–14.4)
PHOSPHORUS: 3.8 MG/DL (ref 2.6–4.5)
PLATELET # BLD: 237 K/UL (ref 138–453)
PLATELET ESTIMATE: ABNORMAL
PMV BLD AUTO: 10.9 FL (ref 8.1–13.5)
POTASSIUM SERPL-SCNC: 4.2 MMOL/L (ref 3.7–5.3)
RBC # BLD: 2.54 M/UL (ref 3.95–5.11)
RBC # BLD: ABNORMAL 10*6/UL
SEG NEUTROPHILS: 69 % (ref 36–66)
SEGMENTED NEUTROPHILS ABSOLUTE COUNT: 5.59 K/UL (ref 1.8–7.7)
SODIUM BLD-SCNC: 134 MMOL/L (ref 135–144)
TRANSFUSION STATUS: NORMAL
TRANSFUSION STATUS: NORMAL
UNIT DIVISION: 0
UNIT DIVISION: 0
UNIT NUMBER: NORMAL
UNIT NUMBER: NORMAL
WBC # BLD: 8.1 K/UL (ref 3.5–11.3)
WBC # BLD: ABNORMAL 10*3/UL

## 2020-03-16 PROCEDURE — 6360000002 HC RX W HCPCS: Performed by: STUDENT IN AN ORGANIZED HEALTH CARE EDUCATION/TRAINING PROGRAM

## 2020-03-16 PROCEDURE — 2500000003 HC RX 250 WO HCPCS: Performed by: STUDENT IN AN ORGANIZED HEALTH CARE EDUCATION/TRAINING PROGRAM

## 2020-03-16 PROCEDURE — 2700000000 HC OXYGEN THERAPY PER DAY

## 2020-03-16 PROCEDURE — 6360000002 HC RX W HCPCS

## 2020-03-16 PROCEDURE — 94761 N-INVAS EAR/PLS OXIMETRY MLT: CPT

## 2020-03-16 PROCEDURE — 6370000000 HC RX 637 (ALT 250 FOR IP): Performed by: STUDENT IN AN ORGANIZED HEALTH CARE EDUCATION/TRAINING PROGRAM

## 2020-03-16 PROCEDURE — 36415 COLL VENOUS BLD VENIPUNCTURE: CPT

## 2020-03-16 PROCEDURE — 99291 CRITICAL CARE FIRST HOUR: CPT | Performed by: INTERNAL MEDICINE

## 2020-03-16 PROCEDURE — 6360000002 HC RX W HCPCS: Performed by: SURGERY

## 2020-03-16 PROCEDURE — 97530 THERAPEUTIC ACTIVITIES: CPT

## 2020-03-16 PROCEDURE — 74018 RADEX ABDOMEN 1 VIEW: CPT

## 2020-03-16 PROCEDURE — 80048 BASIC METABOLIC PNL TOTAL CA: CPT

## 2020-03-16 PROCEDURE — 85018 HEMOGLOBIN: CPT

## 2020-03-16 PROCEDURE — 97535 SELF CARE MNGMENT TRAINING: CPT

## 2020-03-16 PROCEDURE — 6360000002 HC RX W HCPCS: Performed by: HOSPITALIST

## 2020-03-16 PROCEDURE — 94640 AIRWAY INHALATION TREATMENT: CPT

## 2020-03-16 PROCEDURE — 85025 COMPLETE CBC W/AUTO DIFF WBC: CPT

## 2020-03-16 PROCEDURE — 2000000000 HC ICU R&B

## 2020-03-16 PROCEDURE — 2580000003 HC RX 258: Performed by: STUDENT IN AN ORGANIZED HEALTH CARE EDUCATION/TRAINING PROGRAM

## 2020-03-16 PROCEDURE — 84100 ASSAY OF PHOSPHORUS: CPT

## 2020-03-16 PROCEDURE — 85014 HEMATOCRIT: CPT

## 2020-03-16 PROCEDURE — 99024 POSTOP FOLLOW-UP VISIT: CPT | Performed by: SURGERY

## 2020-03-16 PROCEDURE — C9113 INJ PANTOPRAZOLE SODIUM, VIA: HCPCS | Performed by: STUDENT IN AN ORGANIZED HEALTH CARE EDUCATION/TRAINING PROGRAM

## 2020-03-16 PROCEDURE — 83735 ASSAY OF MAGNESIUM: CPT

## 2020-03-16 RX ORDER — FENTANYL CITRATE 50 UG/ML
INJECTION, SOLUTION INTRAMUSCULAR; INTRAVENOUS
Status: COMPLETED
Start: 2020-03-16 | End: 2020-03-16

## 2020-03-16 RX ORDER — FENTANYL CITRATE 50 UG/ML
25 INJECTION, SOLUTION INTRAMUSCULAR; INTRAVENOUS EVERY 4 HOURS PRN
Status: DISCONTINUED | OUTPATIENT
Start: 2020-03-16 | End: 2020-03-17

## 2020-03-16 RX ORDER — FENTANYL CITRATE 50 UG/ML
50 INJECTION, SOLUTION INTRAMUSCULAR; INTRAVENOUS ONCE
Status: COMPLETED | OUTPATIENT
Start: 2020-03-16 | End: 2020-03-16

## 2020-03-16 RX ADMIN — LORAZEPAM 1 MG: 2 INJECTION INTRAMUSCULAR at 15:00

## 2020-03-16 RX ADMIN — ONDANSETRON 4 MG: 2 INJECTION INTRAMUSCULAR; INTRAVENOUS at 11:20

## 2020-03-16 RX ADMIN — FENTANYL CITRATE 25 MCG: 50 INJECTION, SOLUTION INTRAMUSCULAR; INTRAVENOUS at 21:40

## 2020-03-16 RX ADMIN — CLONAZEPAM 0.5 MG: 0.5 TABLET ORAL at 12:40

## 2020-03-16 RX ADMIN — IPRATROPIUM BROMIDE AND ALBUTEROL SULFATE 1 AMPULE: .5; 3 SOLUTION RESPIRATORY (INHALATION) at 11:02

## 2020-03-16 RX ADMIN — POTASSIUM CHLORIDE: 2 INJECTION, SOLUTION, CONCENTRATE INTRAVENOUS at 18:28

## 2020-03-16 RX ADMIN — LORAZEPAM 1 MG: 2 INJECTION INTRAMUSCULAR at 23:54

## 2020-03-16 RX ADMIN — ONDANSETRON 4 MG: 2 INJECTION INTRAMUSCULAR; INTRAVENOUS at 04:51

## 2020-03-16 RX ADMIN — GABAPENTIN 300 MG: 300 CAPSULE ORAL at 16:17

## 2020-03-16 RX ADMIN — METOCLOPRAMIDE 10 MG: 5 INJECTION, SOLUTION INTRAMUSCULAR; INTRAVENOUS at 12:51

## 2020-03-16 RX ADMIN — GABAPENTIN 300 MG: 300 CAPSULE ORAL at 21:36

## 2020-03-16 RX ADMIN — FENTANYL CITRATE 50 MCG: 50 INJECTION, SOLUTION INTRAMUSCULAR; INTRAVENOUS at 08:36

## 2020-03-16 RX ADMIN — TRAZODONE HYDROCHLORIDE 100 MG: 100 TABLET ORAL at 21:37

## 2020-03-16 RX ADMIN — ACETAMINOPHEN 650 MG: 650 SUPPOSITORY RECTAL at 00:04

## 2020-03-16 RX ADMIN — IPRATROPIUM BROMIDE AND ALBUTEROL SULFATE 1 AMPULE: .5; 3 SOLUTION RESPIRATORY (INHALATION) at 08:21

## 2020-03-16 RX ADMIN — METOCLOPRAMIDE 10 MG: 5 INJECTION, SOLUTION INTRAMUSCULAR; INTRAVENOUS at 06:12

## 2020-03-16 RX ADMIN — BUSPIRONE HYDROCHLORIDE 20 MG: 10 TABLET ORAL at 16:17

## 2020-03-16 RX ADMIN — CLONAZEPAM 0.5 MG: 0.5 TABLET ORAL at 21:37

## 2020-03-16 RX ADMIN — POTASSIUM CHLORIDE, DEXTROSE MONOHYDRATE AND SODIUM CHLORIDE: 150; 5; 450 INJECTION, SOLUTION INTRAVENOUS at 00:11

## 2020-03-16 RX ADMIN — LORAZEPAM 1 MG: 2 INJECTION INTRAMUSCULAR at 08:47

## 2020-03-16 RX ADMIN — METOCLOPRAMIDE 10 MG: 5 INJECTION, SOLUTION INTRAMUSCULAR; INTRAVENOUS at 00:24

## 2020-03-16 RX ADMIN — BISACODYL 10 MG: 10 SUPPOSITORY RECTAL at 08:42

## 2020-03-16 RX ADMIN — METOCLOPRAMIDE 10 MG: 5 INJECTION, SOLUTION INTRAMUSCULAR; INTRAVENOUS at 23:58

## 2020-03-16 RX ADMIN — SODIUM CHLORIDE 8 MG/HR: 9 INJECTION, SOLUTION INTRAVENOUS at 08:34

## 2020-03-16 RX ADMIN — METOCLOPRAMIDE 10 MG: 5 INJECTION, SOLUTION INTRAMUSCULAR; INTRAVENOUS at 18:39

## 2020-03-16 RX ADMIN — ESCITALOPRAM OXALATE 20 MG: 10 TABLET ORAL at 12:41

## 2020-03-16 RX ADMIN — SODIUM CHLORIDE 8 MG/HR: 9 INJECTION, SOLUTION INTRAVENOUS at 18:36

## 2020-03-16 RX ADMIN — IPRATROPIUM BROMIDE AND ALBUTEROL SULFATE 1 AMPULE: .5; 3 SOLUTION RESPIRATORY (INHALATION) at 15:20

## 2020-03-16 RX ADMIN — ACETAMINOPHEN 650 MG: 325 TABLET ORAL at 16:19

## 2020-03-16 RX ADMIN — GABAPENTIN 300 MG: 300 CAPSULE ORAL at 12:42

## 2020-03-16 RX ADMIN — FENTANYL CITRATE 25 MCG: 50 INJECTION, SOLUTION INTRAMUSCULAR; INTRAVENOUS at 12:37

## 2020-03-16 RX ADMIN — BUSPIRONE HYDROCHLORIDE 20 MG: 10 TABLET ORAL at 21:36

## 2020-03-16 RX ADMIN — IPRATROPIUM BROMIDE AND ALBUTEROL SULFATE 1 AMPULE: .5; 3 SOLUTION RESPIRATORY (INHALATION) at 20:13

## 2020-03-16 RX ADMIN — I.V. FAT EMULSION 100 ML: 20 EMULSION INTRAVENOUS at 22:16

## 2020-03-16 RX ADMIN — BUSPIRONE HYDROCHLORIDE 20 MG: 10 TABLET ORAL at 12:41

## 2020-03-16 ASSESSMENT — PAIN DESCRIPTION - PROGRESSION

## 2020-03-16 ASSESSMENT — PAIN DESCRIPTION - LOCATION
LOCATION: ABDOMEN

## 2020-03-16 ASSESSMENT — PAIN - FUNCTIONAL ASSESSMENT: PAIN_FUNCTIONAL_ASSESSMENT: PREVENTS OR INTERFERES SOME ACTIVE ACTIVITIES AND ADLS

## 2020-03-16 ASSESSMENT — PAIN DESCRIPTION - PAIN TYPE
TYPE: ACUTE PAIN;CHRONIC PAIN
TYPE: ACUTE PAIN;CHRONIC PAIN
TYPE: ACUTE PAIN
TYPE: ACUTE PAIN

## 2020-03-16 ASSESSMENT — PAIN SCALES - GENERAL
PAINLEVEL_OUTOF10: 8

## 2020-03-16 ASSESSMENT — PAIN DESCRIPTION - ONSET
ONSET: ON-GOING

## 2020-03-16 ASSESSMENT — PAIN DESCRIPTION - ORIENTATION
ORIENTATION: RIGHT;LEFT
ORIENTATION: LOWER
ORIENTATION: RIGHT;LEFT

## 2020-03-16 ASSESSMENT — PAIN DESCRIPTION - DESCRIPTORS
DESCRIPTORS: ACHING;DISCOMFORT
DESCRIPTORS: DISCOMFORT
DESCRIPTORS: ACHING;CONSTANT;DISCOMFORT;SORE
DESCRIPTORS: ACHING;CONSTANT;DISCOMFORT;DULL;SORE

## 2020-03-16 ASSESSMENT — PAIN DESCRIPTION - FREQUENCY
FREQUENCY: CONTINUOUS

## 2020-03-16 NOTE — PROGRESS NOTES
arthroplasty; eye surgery; Colonoscopy; joint replacement (Right); hiatal hernia repair (02/24/2020); Gastric fundoplication (N/A, 8/70/0228); and hc cath power picc triple (3/4/2020). Restrictions  Restrictions/Precautions  Restrictions/Precautions: General Precautions, Fall Risk  Required Braces or Orthoses?: No  Position Activity Restriction  Other position/activity restrictions: up with assistance, extubated on 3/6; high flow nasal cannula     Subjective   General  Patient assessed for rehabilitation services?: Yes  Family / Caregiver Present: No  General Comment  Comments: RN ok'd pt for therapy this PM. Pt agreeable to OT session and pleasant/cooperative throughout  Pain Assessment  Pain Assessment: 0-10  Pain Level: 8  Pain Type: Acute pain  Pain Location: Abdomen  Pain Descriptors: Aching;Discomfort  Vital Signs  Patient Currently in Pain: Yes     Objective    ADL  UE Bathing: Setup;Verbal cueing; Increased time to complete;Maximum assistance(pt requires Max A to complete UB bathing while sitting EOB; setup, mod VCs, and increased time throughout)  UE Dressing: Setup;Verbal cueing; Increased time to complete;Minimal assistance(pt doffed dirty and donned clean gown while supine in bed with VCs and increased time)  Additional Comments: pt requires Max encouragement throughout session. Pt completed UB bathing while seated EOB; pt doffed and donned gown while supine. Pt c/o nausea while seated upright requesting to return to supine to complete dressing       Balance  Sitting Balance: Contact guard assistance(pt sat EOB ~8 minutes for bathing activity; CGA requried throughout as pt fatigues quickly)  Standing Balance: Unable to assess(comment)(pt declining standing this date d/t nausea and fatigue)  Bed mobility  Supine to Sit: Moderate assistance  Sit to Supine: Moderate assistance  Scooting:  Moderate assistance  Comment: HOB raised; max VCs throughout for technique, increased time required Plan   Plan  Times per week: 4-5x/wk   Current Treatment Recommendations: Functional Mobility Training, Endurance Training, Safety Education & Training, Equipment Evaluation, Education, & procurement, Patient/Caregiver Education & Training, Self-Care / ADL, Pain Management, Balance Training, Strengthening    AM-PAC Score        AM-PAC Inpatient Daily Activity Raw Score: 12 (03/16/20 1623)  AM-PAC Inpatient ADL T-Scale Score : 30.6 (03/16/20 1623)  ADL Inpatient CMS 0-100% Score: 66.57 (03/16/20 1623)  ADL Inpatient CMS G-Code Modifier : CL (03/16/20 1623)    Goals  Short term goals  Time Frame for Short term goals: by discharge, pt will  Short term goal 1: demo I in UE ADL activities   Short term goal 2: demo mod A for LE ADL activities   Short term goal 3: demo CGA for functional transfers/ mobility with use of LRD and good safety awareness during functional activities   Short term goal 4: demo increased activity tolerance of of 20+ min to assist with ADL/ functional activities   Short term goal 5: demo understanding and I use of ECWS, fall prevention and proper pursed lipped breathing tech during functional activities   Patient Goals   Patient goals : to move better       Therapy Time   Individual Concurrent Group Co-treatment   Time In 1537         Time Out 1613         Minutes 36         Timed Code Treatment Minutes: 24 Minutes       Tiffanie Ahmadi OTR/L

## 2020-03-16 NOTE — PROGRESS NOTES
Critical Care Team - Daily Progress Note      Date and time: 3/16/2020 1:09 PM  Patient's name:  Tiarra Almazan  Medical Record Number: [id number]  Patient's account/billing number: [de-identified]  Patient's YOB: 1945  Age: 76 y.o. Date of Admission: 2/29/2020 10:37 AM  Length of stay during current admission: 16      Primary Care Physician: Carito Ferrari MD  ICU Attending Physician: Dr. Tabitha Mccloud Status: Full Code    Reason for ICU admission: No chief complaint on file.         SUBJECTIVE:     OVERNIGHT EVENTS:       Patient received 1 unit of PRBC yesterday  Hemoglobin stable at 7.8  Complaining of belly pain 8 out of 10  We will do KUB  1 dose of fentanyl      AWAKE & FOLLOWING COMMANDS:  [] No   [x] Yes    CURRENT VENTILATION STATUS:     [] Ventilator  [] BIPAP  [x] Nasal Cannula [] Room Air      IF INTUBATED, ET TUBE MARKING AT LOWER LIP:       cms    SECRETIONS Amount:  [] Small [] Moderate  [] Large  [x] None  Color:     [] White [] Colored  [] Bloody    SEDATION:  RAAS Score:  [] Propofol gtt  [] Versed gtt  [] Ativan gtt   [x] No Sedation    PARALYZED:  [x] No    [] Yes    DIARRHEA:                [x] No                [] Yes  (C. Difficile status: [] positive                                                                                                                       [] negative                                                                                                                     [] pending)    VASOPRESSORS:  [x] No    [] Yes    If yes -   [] Levophed       [] Dopamine     [] Vasopressin       [] Dobutamine  [] Phenylephrine         [] Epinephrine    CENTRAL LINES:     [x] No   [] Yes   (Date of Insertion:   )           If yes -     [] Right IJ     [] Left IJ [] Right Femoral [] Left Femoral                   [] Right Subclavian [] Left Subclavian       DUDLEY'S CATHETER:   [x] No   [] Yes  (Date of Insertion:   )     URINE OUTPUT:            [x] Good   [] Low [] Anuric      OBJECTIVE:     VITAL SIGNS:  BP (!) 133/52   Pulse 91   Temp 100 °F (37.8 °C) (Core)   Resp 25   Ht 5' 2\" (1.575 m)   Wt 206 lb 5.6 oz (93.6 kg)   SpO2 100%   BMI 37.74 kg/m²   Tmax over 24 hours:  Temp (24hrs), Av.4 °F (38 °C), Min:99.7 °F (37.6 °C), Max:101.5 °F (38.6 °C)      Patient Vitals for the past 8 hrs:   BP Temp Temp src Pulse Resp SpO2 Weight   20 1200 (!) 133/52 100 °F (37.8 °C) CORE 91 25 -- --   20 1103 -- -- -- -- 23 100 % --   20 1100 (!) 103/48 -- -- 81 (!) 33 -- --   20 1000 (!) 112/59 -- -- 87 (!) 34 -- --   20 0813 -- -- -- -- 25 100 % --   20 0800 (!) 133/53 100.2 °F (37.9 °C) CORE 92 26 99 % --   20 0700 138/63 -- -- 93 (!) 36 97 % --   20 0600 (!) 130/50 -- -- 82 (!) 36 99 % 206 lb 5.6 oz (93.6 kg)         Intake/Output Summary (Last 24 hours) at 3/16/2020 1309  Last data filed at 3/16/2020 1254  Gross per 24 hour   Intake 5000.5 ml   Output 4225 ml   Net 775.5 ml     Date 20 0000 - 20 2359   Shift 8792-4178 9844-8752 3809-6604 24 Hour Total   INTAKE   I.V.(mL/kg) 268(2.9)   268(2.9)   TPN(mL/kg) 457(4.9)   457(4.9)   Shift Total(mL/kg) 725(7.7)   725(7.7)   OUTPUT   Urine(mL/kg/hr) 1475(2) 950  2425   Emesis/NG output(mL/kg) 900(9.6)   900(9.6)   Shift Total(mL/kg) 2375(25.4) 950(10.1)  3325(35.5)   Weight (kg) 93.6 93.6 93.6 93.6     Wt Readings from Last 3 Encounters:   20 206 lb 5.6 oz (93.6 kg)   20 186 lb 1.1 oz (84.4 kg)   20 183 lb 6.4 oz (83.2 kg)     Body mass index is 37.74 kg/m². PHYSICAL EXAM:  Constitutional: Appears well, no distress. On HFNC  EENT: PERRLA, EOMI, sclera clear, anicteric, oropharynx clear, no lesions, neck supple with midline trachea. Neck: Supple, symmetrical, trachea midline, no adenopathy, thyroid symmetric, no jvd skin normal  Respiratory: clear to auscultation, no wheezes or rales and unlabored breathing.  No intercostal tenderness  Cardiovascular: regular rate and rhythm, normal S1, S2, no murmur noted and 2+ pulses throughout  Abdomen: tenderness in epigastrium and suprapubic area  NEUROLOGIC Awake, alert, oriented to name, place and time. Cranial nerves II-XII are grossly intact. Motor is 5 out of 5 bilaterally. Sensory is intact.   Extremities:  peripheral pulses normal, no pedal edema, no clubbing or cyanosis  SKIN: normal coloration and turgor    Any additional physical findings:      MEDICATIONS:  Scheduled Meds:   sodium chloride  20 mL Intravenous Once    metoclopramide  10 mg Intravenous Q6H    scopolamine  1 patch Transdermal Q72H    milk and molasses  240 mL Rectal Once    bisacodyl  10 mg Rectal Daily    ipratropium-albuterol  1 ampule Inhalation 4x daily    lidocaine 1 % injection  5 mL Intradermal Once    sodium chloride flush  10 mL Intravenous 2 times per day    fat emulsion  100 mL Intravenous Daily    traZODone  100 mg Oral Nightly    magnesium citrate  296 mL Oral Once    clonazePAM  0.5 mg Oral BID    [Held by provider] amitriptyline  10 mg Oral TID    [Held by provider] amLODIPine  10 mg Oral Nightly    gabapentin  300 mg Oral TID    sodium chloride flush  10 mL Intravenous 2 times per day    busPIRone  20 mg Oral TID    [Held by provider] metoprolol succinate  25 mg Oral Nightly    nicotine  1 patch Transdermal Daily    sodium chloride (PF)  10 mL Intravenous Daily    escitalopram  20 mg Oral Daily     Continuous Infusions:   PN-Adult 2-in-1 Central Line (Standard)      dextrose 5% and 0.45% NaCl with KCl 20 mEq 30 mL/hr at 03/16/20 0011    PN-Adult 2-in-1 Central Line (Standard) 60 mL/hr at 03/15/20 1736    pantoprozole (PROTONIX) infusion 8 mg/hr (03/16/20 0834)     PRN Meds:   fentanNYL, 25 mcg, Q4H PRN  LORazepam, 1 mg, Q6H PRN  dextromethorphan-guaiFENesin, 10 mL, Q4H PRN  albuterol, 2.5 mg, Q6H PRN  sodium chloride flush, 10 mL, PRN  potassium chloride, 40 mEq, PRN physicians at least 27  Min so far today, excluding procedures. Please note that this chart was generated using voice recognition Dragon dictation software. Although every effort was made to ensure the accuracy of this automated transcription, some errors in transcription may have occurred.       Brittnee Santamaria MD  3/16/2020 2:51 PM

## 2020-03-16 NOTE — PROGRESS NOTES
Nutrition Assessment (Parenteral Nutrition)    Type and Reason for Visit: Reassess    Nutrition Recommendations:   - Continue TPN - suggest increase Dextrose to 300 gm and increase NaCl in next bag. At 60 mL/hr with 300 gm Dextrose, 90 gm AA, and 100 mL 20% IL will provide 1580 kcal and 90 gm pro per day. - Monitor labs and modify TPN as needed. - Will monitor for restart of liquid diet, bowel function, and plan of care. Nutrition Assessment: TPN to continue. RN reports pt did not have any more emesis overnight but with c/o nausea. NG remains to suction. Noted last BM 3/15. Malnutrition Assessment:  · Malnutrition Status: At risk for malnutrition    Nutrition Risk Level: High    Nutrient Needs:  · Estimated Daily Total Kcal: 1600 kcal/day   · Estimated Daily Protein (g):  g pro/day     Nutrition Diagnosis:   · Problem: Inadequate oral intake  · Etiology: related to Alteration in GI function, Nausea, Vomiting     Signs and symptoms:  as evidenced by Nutrition support - PN    Objective Information:  · Nutrition-Focused Physical Findings: Last BM 3/15.   · Wound Type: Surgical Wound  · Current Nutrition Therapies:  · Oral Diet Orders: NPO   · Parenteral Nutrition Orders:  · Type and Formula: 2-in-1 Custom(290 gm Dextrose, 90 gm AA)   · Lipids: 100ml, Daily  · Rate/Volume: 60 mL/hr (1440 mL/day)  · Duration: Continuous  · Current PN Order Provides: 1546 kcal and 90 gm pro/day  · Goal PN Orders Provides: 1580 kcal and 90 gm pro/day  · Additional Calories: D5%0.45%NaCl+20mEqKCl at 30 mL/hr = 122 kcal/day  · Anthropometric Measures:  · Ht: 5' 2\" (157.5 cm)   · Current Body Wt: 206 lb 5.8 oz (93.6 kg)  · Admission Body Wt: 197 lb 12 oz (89.7 kg)  · Usual Body Wt: (186 lb on 2/10/20)  · Ideal Body Wt: 110 lb (49.9 kg), % Ideal Body 179% (adm/ideal)   · BMI Classification: BMI 35.0 - 39.9 Obese Class II    Nutrition Interventions:   Modify current Parenteral Nutrition(Suggest in next bag increase Dextrose to 300 gm and increase NaCl.)  Continued Inpatient Monitoring, Education Not Indicated    Nutrition Evaluation:   · Evaluation: Goal achieved   · Goals: meet % of estimated nutrition needs    · Monitoring: PN Intake, PN Tolerance, Skin Integrity, I&O, Weight, Pertinent Labs, Nausea or Vomiting, Monitor Hemodynamic Status, Monitor Bowel Function    Electronically signed by Marygrace Brunner, RD, LD on 3/16/20 at 12:56 PM EDT    Contact Number: [phone]

## 2020-03-17 ENCOUNTER — ANESTHESIA (OUTPATIENT)
Dept: ICU | Age: 75
DRG: 004 | End: 2020-03-17
Payer: MEDICARE

## 2020-03-17 ENCOUNTER — APPOINTMENT (OUTPATIENT)
Dept: CT IMAGING | Age: 75
DRG: 004 | End: 2020-03-17
Attending: INTERNAL MEDICINE
Payer: MEDICARE

## 2020-03-17 ENCOUNTER — APPOINTMENT (OUTPATIENT)
Dept: GENERAL RADIOLOGY | Age: 75
DRG: 004 | End: 2020-03-17
Attending: INTERNAL MEDICINE
Payer: MEDICARE

## 2020-03-17 ENCOUNTER — ANESTHESIA EVENT (OUTPATIENT)
Dept: ICU | Age: 75
DRG: 004 | End: 2020-03-17
Payer: MEDICARE

## 2020-03-17 LAB
-: ABNORMAL
ABSOLUTE EOS #: 0.1 K/UL (ref 0–0.4)
ABSOLUTE EOS #: 0.12 K/UL (ref 0–0.4)
ABSOLUTE IMMATURE GRANULOCYTE: 0 K/UL (ref 0–0.3)
ABSOLUTE IMMATURE GRANULOCYTE: 0.1 K/UL (ref 0–0.3)
ABSOLUTE LYMPH #: 0.71 K/UL (ref 1–4.8)
ABSOLUTE LYMPH #: 1.02 K/UL (ref 1–4.8)
ABSOLUTE MONO #: 0.36 K/UL (ref 0.1–0.8)
ABSOLUTE MONO #: 0.71 K/UL (ref 0.1–0.8)
ALLEN TEST: POSITIVE
ALLEN TEST: POSITIVE
AMORPHOUS: ABNORMAL
ANION GAP SERPL CALCULATED.3IONS-SCNC: 8 MMOL/L (ref 9–17)
ANION GAP SERPL CALCULATED.3IONS-SCNC: 8 MMOL/L (ref 9–17)
BACTERIA: ABNORMAL
BASOPHILS # BLD: 0 % (ref 0–2)
BASOPHILS # BLD: 0 % (ref 0–2)
BASOPHILS ABSOLUTE: 0 K/UL (ref 0–0.2)
BASOPHILS ABSOLUTE: 0 K/UL (ref 0–0.2)
BILIRUBIN URINE: NEGATIVE
BUN BLDV-MCNC: 15 MG/DL (ref 8–23)
BUN BLDV-MCNC: 16 MG/DL (ref 8–23)
BUN/CREAT BLD: ABNORMAL (ref 9–20)
BUN/CREAT BLD: ABNORMAL (ref 9–20)
CALCIUM SERPL-MCNC: 8 MG/DL (ref 8.6–10.4)
CALCIUM SERPL-MCNC: 8.2 MG/DL (ref 8.6–10.4)
CASTS UA: ABNORMAL /LPF (ref 0–2)
CHLORIDE BLD-SCNC: 104 MMOL/L (ref 98–107)
CHLORIDE BLD-SCNC: 104 MMOL/L (ref 98–107)
CO2: 22 MMOL/L (ref 20–31)
CO2: 23 MMOL/L (ref 20–31)
COLOR: YELLOW
CREAT SERPL-MCNC: 0.61 MG/DL (ref 0.5–0.9)
CREAT SERPL-MCNC: 0.79 MG/DL (ref 0.5–0.9)
CRYSTALS, UA: ABNORMAL /HPF
DIFFERENTIAL TYPE: ABNORMAL
DIFFERENTIAL TYPE: ABNORMAL
EOSINOPHILS RELATIVE PERCENT: 1 % (ref 1–4)
EOSINOPHILS RELATIVE PERCENT: 2 % (ref 1–4)
EPITHELIAL CELLS UA: ABNORMAL /HPF (ref 0–5)
FIO2: 35
FIO2: 50
GFR AFRICAN AMERICAN: >60 ML/MIN
GFR AFRICAN AMERICAN: >60 ML/MIN
GFR NON-AFRICAN AMERICAN: >60 ML/MIN
GFR NON-AFRICAN AMERICAN: >60 ML/MIN
GFR SERPL CREATININE-BSD FRML MDRD: ABNORMAL ML/MIN/{1.73_M2}
GLUCOSE BLD-MCNC: 134 MG/DL (ref 70–99)
GLUCOSE BLD-MCNC: 147 MG/DL (ref 74–100)
GLUCOSE BLD-MCNC: 157 MG/DL (ref 70–99)
GLUCOSE URINE: NEGATIVE
HCT VFR BLD CALC: 24.9 % (ref 36.3–47.1)
HCT VFR BLD CALC: 25.3 % (ref 36.3–47.1)
HCT VFR BLD CALC: 27.5 % (ref 36.3–47.1)
HCT VFR BLD CALC: 30.9 % (ref 36.3–47.1)
HEMOGLOBIN: 7.6 G/DL (ref 11.9–15.1)
HEMOGLOBIN: 7.8 G/DL (ref 11.9–15.1)
HEMOGLOBIN: 8.6 G/DL (ref 11.9–15.1)
HEMOGLOBIN: 9.9 G/DL (ref 11.9–15.1)
IMMATURE GRANULOCYTES: 0 %
IMMATURE GRANULOCYTES: 1 %
KETONES, URINE: NEGATIVE
LACTIC ACID, WHOLE BLOOD: 1.6 MMOL/L (ref 0.7–2.1)
LACTIC ACID, WHOLE BLOOD: 1.8 MMOL/L (ref 0.7–2.1)
LEUKOCYTE ESTERASE, URINE: NEGATIVE
LYMPHOCYTES # BLD: 17 % (ref 24–44)
LYMPHOCYTES # BLD: 7 % (ref 24–44)
MCH RBC QN AUTO: 30.9 PG (ref 25.2–33.5)
MCH RBC QN AUTO: 31.4 PG (ref 25.2–33.5)
MCHC RBC AUTO-ENTMCNC: 30.5 G/DL (ref 28.4–34.8)
MCHC RBC AUTO-ENTMCNC: 31.3 G/DL (ref 28.4–34.8)
MCV RBC AUTO: 102.9 FL (ref 82.6–102.9)
MCV RBC AUTO: 98.9 FL (ref 82.6–102.9)
MODE: ABNORMAL
MODE: ABNORMAL
MONOCYTES # BLD: 6 % (ref 1–7)
MONOCYTES # BLD: 7 % (ref 1–7)
MORPHOLOGY: ABNORMAL
MORPHOLOGY: ABNORMAL
MUCUS: ABNORMAL
NEGATIVE BASE EXCESS, ART: ABNORMAL (ref 0–2)
NEGATIVE BASE EXCESS, ART: ABNORMAL (ref 0–2)
NITRITE, URINE: NEGATIVE
NRBC AUTOMATED: 0.2 PER 100 WBC
NRBC AUTOMATED: 0.3 PER 100 WBC
O2 DEVICE/FLOW/%: ABNORMAL
O2 DEVICE/FLOW/%: ABNORMAL
OTHER OBSERVATIONS UA: ABNORMAL
PATIENT TEMP: 39.8
PATIENT TEMP: 40
PDW BLD-RTO: 15.5 % (ref 11.8–14.4)
PDW BLD-RTO: 15.7 % (ref 11.8–14.4)
PH UA: 5.5 (ref 5–8)
PLATELET # BLD: 234 K/UL (ref 138–453)
PLATELET # BLD: 244 K/UL (ref 138–453)
PLATELET ESTIMATE: ABNORMAL
PLATELET ESTIMATE: ABNORMAL
PMV BLD AUTO: 10.7 FL (ref 8.1–13.5)
PMV BLD AUTO: 11 FL (ref 8.1–13.5)
POC HCO3: 24.4 MMOL/L (ref 21–28)
POC HCO3: 25 MMOL/L (ref 21–28)
POC O2 SATURATION: 96 % (ref 94–98)
POC O2 SATURATION: 97 % (ref 94–98)
POC PCO2 TEMP: 35 MM HG
POC PCO2 TEMP: 42 MM HG
POC PCO2: 30.9 MM HG (ref 35–48)
POC PCO2: 37.1 MM HG (ref 35–48)
POC PH TEMP: 7.39
POC PH TEMP: 7.46
POC PH: 7.44 (ref 7.35–7.45)
POC PH: 7.51 (ref 7.35–7.45)
POC PO2 TEMP: 97 MM HG
POC PO2 TEMP: 98 MM HG
POC PO2: 80.6 MM HG (ref 83–108)
POC PO2: 80.8 MM HG (ref 83–108)
POSITIVE BASE EXCESS, ART: 1 (ref 0–3)
POSITIVE BASE EXCESS, ART: 2 (ref 0–3)
POTASSIUM SERPL-SCNC: 4.3 MMOL/L (ref 3.7–5.3)
POTASSIUM SERPL-SCNC: 4.4 MMOL/L (ref 3.7–5.3)
PROTEIN UA: ABNORMAL
RBC # BLD: 2.42 M/UL (ref 3.95–5.11)
RBC # BLD: 2.78 M/UL (ref 3.95–5.11)
RBC # BLD: ABNORMAL 10*6/UL
RBC # BLD: ABNORMAL 10*6/UL
RBC UA: ABNORMAL /HPF (ref 0–2)
RENAL EPITHELIAL, UA: ABNORMAL /HPF
SAMPLE SITE: ABNORMAL
SAMPLE SITE: ABNORMAL
SEG NEUTROPHILS: 75 % (ref 36–66)
SEG NEUTROPHILS: 84 % (ref 36–66)
SEGMENTED NEUTROPHILS ABSOLUTE COUNT: 4.5 K/UL (ref 1.8–7.7)
SEGMENTED NEUTROPHILS ABSOLUTE COUNT: 8.58 K/UL (ref 1.8–7.7)
SODIUM BLD-SCNC: 134 MMOL/L (ref 135–144)
SODIUM BLD-SCNC: 135 MMOL/L (ref 135–144)
SPECIFIC GRAVITY UA: 1.01 (ref 1–1.03)
TCO2 (CALC), ART: 25 MMOL/L (ref 22–29)
TCO2 (CALC), ART: 26 MMOL/L (ref 22–29)
TRICHOMONAS: ABNORMAL
TURBIDITY: ABNORMAL
URINE HGB: ABNORMAL
UROBILINOGEN, URINE: NORMAL
WBC # BLD: 10.2 K/UL (ref 3.5–11.3)
WBC # BLD: 6 K/UL (ref 3.5–11.3)
WBC # BLD: ABNORMAL 10*3/UL
WBC # BLD: ABNORMAL 10*3/UL
WBC UA: ABNORMAL /HPF (ref 0–5)
YEAST: ABNORMAL

## 2020-03-17 PROCEDURE — 87086 URINE CULTURE/COLONY COUNT: CPT

## 2020-03-17 PROCEDURE — 6360000002 HC RX W HCPCS

## 2020-03-17 PROCEDURE — 87070 CULTURE OTHR SPECIMN AEROBIC: CPT

## 2020-03-17 PROCEDURE — 0100U HC RESPIRPTHGN MULT REV TRANS & AMP PRB TECH 21 TRGT: CPT

## 2020-03-17 PROCEDURE — 2500000003 HC RX 250 WO HCPCS: Performed by: STUDENT IN AN ORGANIZED HEALTH CARE EDUCATION/TRAINING PROGRAM

## 2020-03-17 PROCEDURE — 94640 AIRWAY INHALATION TREATMENT: CPT

## 2020-03-17 PROCEDURE — 89220 SPUTUM SPECIMEN COLLECTION: CPT

## 2020-03-17 PROCEDURE — 82947 ASSAY GLUCOSE BLOOD QUANT: CPT

## 2020-03-17 PROCEDURE — 71045 X-RAY EXAM CHEST 1 VIEW: CPT

## 2020-03-17 PROCEDURE — 2000000000 HC ICU R&B

## 2020-03-17 PROCEDURE — 94761 N-INVAS EAR/PLS OXIMETRY MLT: CPT

## 2020-03-17 PROCEDURE — 2580000003 HC RX 258: Performed by: STUDENT IN AN ORGANIZED HEALTH CARE EDUCATION/TRAINING PROGRAM

## 2020-03-17 PROCEDURE — 6360000002 HC RX W HCPCS: Performed by: HOSPITALIST

## 2020-03-17 PROCEDURE — 82803 BLOOD GASES ANY COMBINATION: CPT

## 2020-03-17 PROCEDURE — 74177 CT ABD & PELVIS W/CONTRAST: CPT

## 2020-03-17 PROCEDURE — 74018 RADEX ABDOMEN 1 VIEW: CPT

## 2020-03-17 PROCEDURE — 6360000002 HC RX W HCPCS: Performed by: STUDENT IN AN ORGANIZED HEALTH CARE EDUCATION/TRAINING PROGRAM

## 2020-03-17 PROCEDURE — 2500000003 HC RX 250 WO HCPCS

## 2020-03-17 PROCEDURE — 94660 CPAP INITIATION&MGMT: CPT

## 2020-03-17 PROCEDURE — C9113 INJ PANTOPRAZOLE SODIUM, VIA: HCPCS | Performed by: STUDENT IN AN ORGANIZED HEALTH CARE EDUCATION/TRAINING PROGRAM

## 2020-03-17 PROCEDURE — 94770 HC ETCO2 MONITOR DAILY: CPT

## 2020-03-17 PROCEDURE — 94003 VENT MGMT INPAT SUBQ DAY: CPT

## 2020-03-17 PROCEDURE — 85014 HEMATOCRIT: CPT

## 2020-03-17 PROCEDURE — 71260 CT THORAX DX C+: CPT

## 2020-03-17 PROCEDURE — 85025 COMPLETE CBC W/AUTO DIFF WBC: CPT

## 2020-03-17 PROCEDURE — 87641 MR-STAPH DNA AMP PROBE: CPT

## 2020-03-17 PROCEDURE — 99291 CRITICAL CARE FIRST HOUR: CPT | Performed by: INTERNAL MEDICINE

## 2020-03-17 PROCEDURE — 87040 BLOOD CULTURE FOR BACTERIA: CPT

## 2020-03-17 PROCEDURE — 6370000000 HC RX 637 (ALT 250 FOR IP): Performed by: STUDENT IN AN ORGANIZED HEALTH CARE EDUCATION/TRAINING PROGRAM

## 2020-03-17 PROCEDURE — 6360000002 HC RX W HCPCS: Performed by: SURGERY

## 2020-03-17 PROCEDURE — 2700000000 HC OXYGEN THERAPY PER DAY

## 2020-03-17 PROCEDURE — 93005 ELECTROCARDIOGRAM TRACING: CPT | Performed by: STUDENT IN AN ORGANIZED HEALTH CARE EDUCATION/TRAINING PROGRAM

## 2020-03-17 PROCEDURE — 43235 EGD DIAGNOSTIC BRUSH WASH: CPT | Performed by: SURGERY

## 2020-03-17 PROCEDURE — 87205 SMEAR GRAM STAIN: CPT

## 2020-03-17 PROCEDURE — 80048 BASIC METABOLIC PNL TOTAL CA: CPT

## 2020-03-17 PROCEDURE — 83605 ASSAY OF LACTIC ACID: CPT

## 2020-03-17 PROCEDURE — 85018 HEMOGLOBIN: CPT

## 2020-03-17 PROCEDURE — 0BH18EZ INSERTION OF ENDOTRACHEAL AIRWAY INTO TRACHEA, VIA NATURAL OR ARTIFICIAL OPENING ENDOSCOPIC: ICD-10-PCS | Performed by: ANESTHESIOLOGY

## 2020-03-17 PROCEDURE — 6360000004 HC RX CONTRAST MEDICATION: Performed by: STUDENT IN AN ORGANIZED HEALTH CARE EDUCATION/TRAINING PROGRAM

## 2020-03-17 PROCEDURE — 6370000000 HC RX 637 (ALT 250 FOR IP): Performed by: INTERNAL MEDICINE

## 2020-03-17 PROCEDURE — 36600 WITHDRAWAL OF ARTERIAL BLOOD: CPT

## 2020-03-17 PROCEDURE — 76937 US GUIDE VASCULAR ACCESS: CPT

## 2020-03-17 PROCEDURE — 81001 URINALYSIS AUTO W/SCOPE: CPT

## 2020-03-17 PROCEDURE — 3609017100 HC EGD: Performed by: SURGERY

## 2020-03-17 PROCEDURE — 31500 INSERT EMERGENCY AIRWAY: CPT | Performed by: ANESTHESIOLOGY

## 2020-03-17 PROCEDURE — 36415 COLL VENOUS BLD VENIPUNCTURE: CPT

## 2020-03-17 RX ORDER — SODIUM CHLORIDE 9 MG/ML
INJECTION, SOLUTION INTRAVENOUS CONTINUOUS
Status: DISCONTINUED | OUTPATIENT
Start: 2020-03-17 | End: 2020-04-06 | Stop reason: HOSPADM

## 2020-03-17 RX ORDER — MIDAZOLAM HYDROCHLORIDE 1 MG/ML
2 INJECTION INTRAMUSCULAR; INTRAVENOUS ONCE
Status: DISCONTINUED | OUTPATIENT
Start: 2020-03-17 | End: 2020-04-03

## 2020-03-17 RX ORDER — PROPOFOL 10 MG/ML
10 INJECTION, EMULSION INTRAVENOUS
Status: DISCONTINUED | OUTPATIENT
Start: 2020-03-17 | End: 2020-03-17

## 2020-03-17 RX ORDER — VANCOMYCIN HYDROCHLORIDE 1 G/200ML
1000 INJECTION, SOLUTION INTRAVENOUS EVERY 12 HOURS
Status: DISCONTINUED | OUTPATIENT
Start: 2020-03-17 | End: 2020-03-18 | Stop reason: RX

## 2020-03-17 RX ORDER — 0.9 % SODIUM CHLORIDE 0.9 %
500 INTRAVENOUS SOLUTION INTRAVENOUS ONCE
Status: COMPLETED | OUTPATIENT
Start: 2020-03-17 | End: 2020-03-17

## 2020-03-17 RX ORDER — 0.9 % SODIUM CHLORIDE 0.9 %
1000 INTRAVENOUS SOLUTION INTRAVENOUS ONCE
Status: COMPLETED | OUTPATIENT
Start: 2020-03-17 | End: 2020-03-17

## 2020-03-17 RX ORDER — MIDAZOLAM HYDROCHLORIDE 1 MG/ML
INJECTION INTRAMUSCULAR; INTRAVENOUS
Status: DISPENSED
Start: 2020-03-17 | End: 2020-03-17

## 2020-03-17 RX ORDER — PROPOFOL 10 MG/ML
INJECTION, EMULSION INTRAVENOUS
Status: COMPLETED
Start: 2020-03-17 | End: 2020-03-17

## 2020-03-17 RX ORDER — FENTANYL CITRATE 50 UG/ML
50 INJECTION, SOLUTION INTRAMUSCULAR; INTRAVENOUS ONCE
Status: DISCONTINUED | OUTPATIENT
Start: 2020-03-17 | End: 2020-03-22

## 2020-03-17 RX ORDER — PANTOPRAZOLE SODIUM 40 MG/10ML
40 INJECTION, POWDER, LYOPHILIZED, FOR SOLUTION INTRAVENOUS 2 TIMES DAILY
Status: DISCONTINUED | OUTPATIENT
Start: 2020-03-17 | End: 2020-04-06 | Stop reason: HOSPADM

## 2020-03-17 RX ORDER — SODIUM CHLORIDE 9 MG/ML
10 INJECTION INTRAVENOUS 2 TIMES DAILY
Status: DISCONTINUED | OUTPATIENT
Start: 2020-03-17 | End: 2020-04-06 | Stop reason: HOSPADM

## 2020-03-17 RX ORDER — FENTANYL CITRATE 50 UG/ML
50 INJECTION, SOLUTION INTRAMUSCULAR; INTRAVENOUS
Status: DISCONTINUED | OUTPATIENT
Start: 2020-03-17 | End: 2020-03-19

## 2020-03-17 RX ORDER — FENTANYL CITRATE 50 UG/ML
INJECTION, SOLUTION INTRAMUSCULAR; INTRAVENOUS
Status: COMPLETED
Start: 2020-03-17 | End: 2020-03-17

## 2020-03-17 RX ORDER — 0.9 % SODIUM CHLORIDE 0.9 %
1000 INTRAVENOUS SOLUTION INTRAVENOUS ONCE
Status: COMPLETED | OUTPATIENT
Start: 2020-03-18 | End: 2020-03-18

## 2020-03-17 RX ORDER — SUCRALFATE 1 G/1
1 TABLET ORAL EVERY 6 HOURS SCHEDULED
Status: DISCONTINUED | OUTPATIENT
Start: 2020-03-17 | End: 2020-04-06 | Stop reason: HOSPADM

## 2020-03-17 RX ORDER — MIDAZOLAM HYDROCHLORIDE 1 MG/ML
1 INJECTION INTRAMUSCULAR; INTRAVENOUS ONCE
Status: COMPLETED | OUTPATIENT
Start: 2020-03-17 | End: 2020-03-17

## 2020-03-17 RX ORDER — PROPOFOL 10 MG/ML
INJECTION, EMULSION INTRAVENOUS
Status: DISCONTINUED
Start: 2020-03-17 | End: 2020-03-17 | Stop reason: WASHOUT

## 2020-03-17 RX ORDER — CLINDAMYCIN PHOSPHATE 600 MG/50ML
600 INJECTION INTRAVENOUS EVERY 8 HOURS
Status: DISCONTINUED | OUTPATIENT
Start: 2020-03-17 | End: 2020-03-17

## 2020-03-17 RX ORDER — IPRATROPIUM BROMIDE AND ALBUTEROL SULFATE 2.5; .5 MG/3ML; MG/3ML
1 SOLUTION RESPIRATORY (INHALATION) EVERY 6 HOURS
Status: DISCONTINUED | OUTPATIENT
Start: 2020-03-17 | End: 2020-03-18

## 2020-03-17 RX ORDER — FENTANYL CITRATE 50 UG/ML
50 INJECTION, SOLUTION INTRAMUSCULAR; INTRAVENOUS ONCE
Status: COMPLETED | OUTPATIENT
Start: 2020-03-17 | End: 2020-03-17

## 2020-03-17 RX ADMIN — GABAPENTIN 300 MG: 300 CAPSULE ORAL at 08:33

## 2020-03-17 RX ADMIN — POTASSIUM CHLORIDE: 2 INJECTION, SOLUTION, CONCENTRATE INTRAVENOUS at 18:32

## 2020-03-17 RX ADMIN — SODIUM CHLORIDE: 9 INJECTION, SOLUTION INTRAVENOUS at 15:51

## 2020-03-17 RX ADMIN — BUSPIRONE HYDROCHLORIDE 20 MG: 10 TABLET ORAL at 16:27

## 2020-03-17 RX ADMIN — PROPOFOL 10 MCG/KG/MIN: 10 INJECTION, EMULSION INTRAVENOUS at 13:35

## 2020-03-17 RX ADMIN — IPRATROPIUM BROMIDE AND ALBUTEROL SULFATE 1 AMPULE: .5; 3 SOLUTION RESPIRATORY (INHALATION) at 19:44

## 2020-03-17 RX ADMIN — IPRATROPIUM BROMIDE AND ALBUTEROL SULFATE 1 AMPULE: .5; 3 SOLUTION RESPIRATORY (INHALATION) at 08:26

## 2020-03-17 RX ADMIN — FENTANYL CITRATE 25 MCG: 50 INJECTION, SOLUTION INTRAMUSCULAR; INTRAVENOUS at 06:38

## 2020-03-17 RX ADMIN — SODIUM CHLORIDE 8 MG/HR: 9 INJECTION, SOLUTION INTRAVENOUS at 05:30

## 2020-03-17 RX ADMIN — FENTANYL CITRATE 50 MCG: 50 INJECTION, SOLUTION INTRAMUSCULAR; INTRAVENOUS at 07:33

## 2020-03-17 RX ADMIN — ONDANSETRON 4 MG: 2 INJECTION INTRAMUSCULAR; INTRAVENOUS at 02:03

## 2020-03-17 RX ADMIN — IOHEXOL 75 ML: 350 INJECTION, SOLUTION INTRAVENOUS at 15:15

## 2020-03-17 RX ADMIN — IPRATROPIUM BROMIDE AND ALBUTEROL SULFATE 1 AMPULE: .5; 3 SOLUTION RESPIRATORY (INHALATION) at 15:29

## 2020-03-17 RX ADMIN — LORAZEPAM 1 MG: 2 INJECTION INTRAMUSCULAR at 09:11

## 2020-03-17 RX ADMIN — ACETAMINOPHEN 650 MG: 650 SUPPOSITORY RECTAL at 11:01

## 2020-03-17 RX ADMIN — ACETAMINOPHEN 650 MG: 325 TABLET ORAL at 17:01

## 2020-03-17 RX ADMIN — GABAPENTIN 300 MG: 300 CAPSULE ORAL at 16:27

## 2020-03-17 RX ADMIN — POTASSIUM CHLORIDE, DEXTROSE MONOHYDRATE AND SODIUM CHLORIDE: 150; 5; 450 INJECTION, SOLUTION INTRAVENOUS at 05:33

## 2020-03-17 RX ADMIN — DEXMEDETOMIDINE HYDROCHLORIDE 0.2 MCG/KG/HR: 100 INJECTION, SOLUTION INTRAVENOUS at 19:38

## 2020-03-17 RX ADMIN — MEROPENEM 1 G: 1 INJECTION, POWDER, FOR SOLUTION INTRAVENOUS at 21:07

## 2020-03-17 RX ADMIN — FENTANYL CITRATE 50 MCG: 50 INJECTION, SOLUTION INTRAMUSCULAR; INTRAVENOUS at 16:40

## 2020-03-17 RX ADMIN — SUCRALFATE 1 G: 1 TABLET ORAL at 10:48

## 2020-03-17 RX ADMIN — SUCRALFATE 1 G: 1 TABLET ORAL at 18:44

## 2020-03-17 RX ADMIN — ESCITALOPRAM OXALATE 20 MG: 10 TABLET ORAL at 08:33

## 2020-03-17 RX ADMIN — METOCLOPRAMIDE 10 MG: 5 INJECTION, SOLUTION INTRAMUSCULAR; INTRAVENOUS at 11:54

## 2020-03-17 RX ADMIN — METOCLOPRAMIDE 10 MG: 5 INJECTION, SOLUTION INTRAMUSCULAR; INTRAVENOUS at 05:34

## 2020-03-17 RX ADMIN — SODIUM CHLORIDE, PRESERVATIVE FREE 10 ML: 5 INJECTION INTRAVENOUS at 08:14

## 2020-03-17 RX ADMIN — FENTANYL CITRATE 50 MCG: 50 INJECTION, SOLUTION INTRAMUSCULAR; INTRAVENOUS at 14:17

## 2020-03-17 RX ADMIN — ONDANSETRON 4 MG: 2 INJECTION INTRAMUSCULAR; INTRAVENOUS at 09:11

## 2020-03-17 RX ADMIN — BUSPIRONE HYDROCHLORIDE 20 MG: 10 TABLET ORAL at 08:33

## 2020-03-17 RX ADMIN — CLONAZEPAM 0.5 MG: 0.5 TABLET ORAL at 08:33

## 2020-03-17 RX ADMIN — SODIUM CHLORIDE, PRESERVATIVE FREE 10 ML: 5 INJECTION INTRAVENOUS at 21:03

## 2020-03-17 RX ADMIN — SODIUM CHLORIDE, PRESERVATIVE FREE 10 ML: 5 INJECTION INTRAVENOUS at 21:04

## 2020-03-17 RX ADMIN — VANCOMYCIN HYDROCHLORIDE 1000 MG: 1 INJECTION, SOLUTION INTRAVENOUS at 16:29

## 2020-03-17 RX ADMIN — GABAPENTIN 300 MG: 300 CAPSULE ORAL at 21:07

## 2020-03-17 RX ADMIN — PANTOPRAZOLE SODIUM 40 MG: 40 INJECTION, POWDER, FOR SOLUTION INTRAVENOUS at 21:07

## 2020-03-17 RX ADMIN — BISACODYL 10 MG: 10 SUPPOSITORY RECTAL at 09:03

## 2020-03-17 RX ADMIN — METOCLOPRAMIDE 10 MG: 5 INJECTION, SOLUTION INTRAMUSCULAR; INTRAVENOUS at 18:44

## 2020-03-17 RX ADMIN — FENTANYL CITRATE 25 MCG: 50 INJECTION, SOLUTION INTRAMUSCULAR; INTRAVENOUS at 01:59

## 2020-03-17 RX ADMIN — ACETAMINOPHEN 650 MG: 325 TABLET ORAL at 02:55

## 2020-03-17 RX ADMIN — MEROPENEM 1 G: 1 INJECTION, POWDER, FOR SOLUTION INTRAVENOUS at 12:17

## 2020-03-17 RX ADMIN — Medication 10 ML: at 21:07

## 2020-03-17 RX ADMIN — FENTANYL CITRATE 25 MCG: 50 INJECTION, SOLUTION INTRAMUSCULAR; INTRAVENOUS at 10:58

## 2020-03-17 RX ADMIN — SODIUM CHLORIDE, PRESERVATIVE FREE 10 ML: 5 INJECTION INTRAVENOUS at 08:15

## 2020-03-17 RX ADMIN — TRAZODONE HYDROCHLORIDE 100 MG: 100 TABLET ORAL at 21:07

## 2020-03-17 RX ADMIN — BUSPIRONE HYDROCHLORIDE 20 MG: 10 TABLET ORAL at 21:07

## 2020-03-17 RX ADMIN — LORAZEPAM 1 MG: 2 INJECTION INTRAMUSCULAR at 18:13

## 2020-03-17 RX ADMIN — SODIUM CHLORIDE 500 ML: 9 INJECTION, SOLUTION INTRAVENOUS at 16:56

## 2020-03-17 RX ADMIN — CLONAZEPAM 0.5 MG: 0.5 TABLET ORAL at 21:07

## 2020-03-17 RX ADMIN — SODIUM CHLORIDE 1000 ML: 9 INJECTION, SOLUTION INTRAVENOUS at 13:50

## 2020-03-17 RX ADMIN — IPRATROPIUM BROMIDE AND ALBUTEROL SULFATE 1 AMPULE: .5; 3 SOLUTION RESPIRATORY (INHALATION) at 12:14

## 2020-03-17 RX ADMIN — Medication 1 MG/HR: at 19:37

## 2020-03-17 RX ADMIN — Medication 2 MCG/MIN: at 20:41

## 2020-03-17 RX ADMIN — MIDAZOLAM HYDROCHLORIDE 1 MG: 1 INJECTION, SOLUTION INTRAMUSCULAR; INTRAVENOUS at 07:33

## 2020-03-17 ASSESSMENT — PAIN SCALES - GENERAL
PAINLEVEL_OUTOF10: 8

## 2020-03-17 ASSESSMENT — PAIN DESCRIPTION - PROGRESSION
CLINICAL_PROGRESSION: NOT CHANGED

## 2020-03-17 ASSESSMENT — PAIN DESCRIPTION - ORIENTATION
ORIENTATION: LOWER

## 2020-03-17 ASSESSMENT — PAIN DESCRIPTION - LOCATION
LOCATION: ABDOMEN

## 2020-03-17 ASSESSMENT — PAIN DESCRIPTION - PAIN TYPE
TYPE: ACUTE PAIN

## 2020-03-17 ASSESSMENT — PULMONARY FUNCTION TESTS
PIF_VALUE: 18
PIF_VALUE: 11
PIF_VALUE: 19
PIF_VALUE: 18
PIF_VALUE: 10
PIF_VALUE: 9
PIF_VALUE: 17

## 2020-03-17 ASSESSMENT — PAIN DESCRIPTION - FREQUENCY
FREQUENCY: CONTINUOUS

## 2020-03-17 ASSESSMENT — PAIN DESCRIPTION - ONSET
ONSET: ON-GOING

## 2020-03-17 ASSESSMENT — PAIN DESCRIPTION - DESCRIPTORS
DESCRIPTORS: DISCOMFORT
DESCRIPTORS: DISCOMFORT
DESCRIPTORS: ACHING;DISCOMFORT
DESCRIPTORS: DISCOMFORT

## 2020-03-17 NOTE — PROGRESS NOTES
mL/day)  · Duration: Continuous  · Current PN Order Provides: 1580 kcal and 90 gm pro/day  · Goal PN Orders Provides: 1580 kcal and 90 gm pro/day  · Additional Calories: Propofol at 16.7 mL/hr = 441 kcal/day  · Anthropometric Measures:  · Ht: 5' 2\" (157.5 cm)   · Current Body Wt: 205 lb 0.4 oz (93 kg)  · Admission Body Wt: 197 lb 12 oz (89.7 kg)  · Usual Body Wt: (186 lb on 2/10/20)  · Ideal Body Wt: 110 lb (49.9 kg), % Ideal Body 179% (adm/ideal)   · BMI Classification: BMI 35.0 - 39.9 Obese Class II    Nutrition Interventions:   Modify current Parenteral Nutrition(Suggest discontinuing IV lipids with TPN d/t start of propofol.)  Continued Inpatient Monitoring, Education Not Indicated    Nutrition Evaluation:   · Evaluation: Goal achieved   · Goals: meet % of estimated nutrition needs    · Monitoring: PN Intake, PN Tolerance, Skin Integrity, I&O, Weight, Pertinent Labs, Monitor Hemodynamic Status, Monitor Bowel Function    Electronically signed by Tahira Gilliam RD, LD on 3/17/20 at 3:23 PM EDT    Contact Number: [phone]

## 2020-03-17 NOTE — ANESTHESIA PROCEDURE NOTES
Airway  Date/Time: 3/17/2020 1:30 PM  Urgency: urgent    Airway not difficult    General Information and Staff    Patient location during procedure: ICU  Anesthesiologist: Chris Arroyo MD  Performed: anesthesiologist     Consent for Airway (if performed for an anesthetic, see related documentation for consents)  Patient identity confirmed: per hospital policy  Consent: The procedure was performed in an emergent situation. Verbal consent not obtained. Written consent not obtained.   Risks and benefits: risks, benefits and alternatives were not discussed      Code status verified:yes  Indications and Patient Condition  Indications for airway management: respiratory failure and airway protection  Spontaneous ventilation: present  Sedation level: deep  Preoxygenated: yes  Patient position: sniffing  MILS not maintained throughout  Mask difficulty assessment: not attempted    Final Airway Details  Final airway type: endotracheal airway      Successful airway: ETT  Cuffed: yes   Successful intubation technique: direct laryngoscopy  Endotracheal tube insertion site: oral  Blade: Ritika  Blade size: #3  ETT size (mm): 7.5  Cormack-Lehane Classification: grade IIb - view of arytenoids or posterior of glottis only  Placement verified by: chest auscultation and capnometry   Measured from: gums  ETT to gums (cm): 21  Number of attempts at approach: 1  Ventilation between attempts: bag mask  Number of other approaches attempted: 0    no

## 2020-03-17 NOTE — PLAN OF CARE
membranes  Description: Structural intactness and normal physiological function of skin and  mucous membranes.   Outcome: Ongoing     Problem: OXYGENATION/RESPIRATORY FUNCTION  Goal: Patient will achieve/maintain normal respiratory rate/effort  Description: Respiratory rate and effort will be within normal limits for the patient  3/17/2020 0527 by Shama Caba RN  Outcome: Ongoing  3/16/2020 2208 by Rosemarie Szymanski RCP  Outcome: Ongoing     Problem: Nutrition  Goal: Optimal nutrition therapy  Outcome: Ongoing     Problem: Musculor/Skeletal Functional Status  Goal: Highest potential functional level  Outcome: Ongoing     Problem: Discharge Planning:  Goal: Discharged to appropriate level of care  Description: Discharged to appropriate level of care  Outcome: Not Met This Shift     Problem: Nutrition Deficit:  Goal: Ability to achieve adequate nutritional intake will improve  Description: Ability to achieve adequate nutritional intake will improve  Outcome: Not Met This Shift

## 2020-03-17 NOTE — PROGRESS NOTES
Surgery Progress Note            PATIENT NAME: Missy Pagan     TODAY'S DATE: 3/17/2020, 7:59 AM    SUBJECTIVE:    Pt seen and examined at bedside. No emesis. Hemoglobin stable. Patient anxious. OBJECTIVE:   VITALS:  BP (!) 115/43   Pulse 89   Temp 100.8 °F (38.2 °C) (Core)   Resp 28   Ht 5' 2\" (1.575 m)   Wt 205 lb 0.4 oz (93 kg)   SpO2 93%   BMI 37.50 kg/m²      INTAKE/OUTPUT:      Intake/Output Summary (Last 24 hours) at 3/17/2020 0759  Last data filed at 3/17/2020 0400  Gross per 24 hour   Intake 2457.95 ml   Output 3925 ml   Net -1467.05 ml       PHYSICAL EXAM  Constitutional:  Alert and oriented. Anxious  HEENT:      Head: Normocephalic. Atraumatic     Eyes: pupils are equal and reactive. Cardiovascular: Regular rate and rhythm  Pulmonary/Chest: Regular respiratory rate, on high flow  Abdominal: Soft. Non distended, non tender to palpation  Musculoskeletal: equal ROM  Neurological: No gross motor deficits equally  Skin: Skin is warm, dry and intact. S/p hiatal hernia repair with syeda funduplication on 2/55/11  Pneumonia- AM CXR, Abx per primary  Anxiety  Anemia     Plan:  1. Continue TPN  2. NPO with NGT to LIS -- will clamp NGT later today. Ok to start clears 30 cc every 15 minutes. No carbonated beverages or straws  3. Monitor H/H  1. Hgb 7.6 (8.1)  4. EGD performed at bedside: Appears to be an ulcer at the GE junction and gastritis  5. Continue to monitor hemodynamics - stable at this time  6. Add carafate 4 times a day  7. Ok to do protonix BID instead of drip  8. Continue bowel regimen  9.  Medical management per primary team.      Electronically signed by Julia Harris DO  on 3/17/2020 at 7:59 AM

## 2020-03-17 NOTE — PLAN OF CARE
Lana Schmitz RCP  Reactivated  Goal: ET tube will be managed safely  3/17/2020 1738 by Emilia Gamble RN  Outcome: Ongoing  3/17/2020 1547 by Lana Schmitz RCP  Reactivated     Problem: Nutrition  Goal: Optimal nutrition therapy  3/17/2020 1738 by Emilia Gamble RN  Outcome: Ongoing  3/17/2020 0527 by Lubna Domingo RN  Outcome: Ongoing     Problem: Musculor/Skeletal Functional Status  Goal: Highest potential functional level  3/17/2020 0527 by Lubna Domingo RN  Outcome: Ongoing     Problem: Discharge Planning:  Goal: Discharged to appropriate level of care  Description: Discharged to appropriate level of care  3/17/2020 0527 by Lubna Domingo RN  Outcome: Not Met This Shift     Problem: Restraint Use - Nonviolent/Non-Self-Destructive Behavior:  Goal: Absence of restraint indications  Description: Absence of restraint indications  Outcome: Not Met This Shift

## 2020-03-17 NOTE — PROGRESS NOTES
auscultation, no wheezes or rales and unlabored breathing. No intercostal tenderness  Cardiovascular: regular rate and rhythm, normal S1, S2, no murmur noted and 2+ pulses throughout  Abdomen: tenderness in epigastrium and suprapubic area  NEUROLOGIC Awake, alert, oriented to name, place and time. Cranial nerves II-XII are grossly intact. Motor is 5 out of 5 bilaterally. Sensory is intact.   Extremities:  peripheral pulses normal, no pedal edema, no clubbing or cyanosis  SKIN: normal coloration and turgor    Any additional physical findings:      MEDICATIONS:  Scheduled Meds:   fentanNYL  50 mcg Intravenous Once    midazolam  2 mg Intravenous Once    sucralfate  1 g Oral 4 times per day    meropenem  1 g Intravenous Q8H    sodium chloride  20 mL Intravenous Once    metoclopramide  10 mg Intravenous Q6H    milk and molasses  240 mL Rectal Once    bisacodyl  10 mg Rectal Daily    ipratropium-albuterol  1 ampule Inhalation 4x daily    lidocaine 1 % injection  5 mL Intradermal Once    sodium chloride flush  10 mL Intravenous 2 times per day    fat emulsion  100 mL Intravenous Daily    traZODone  100 mg Oral Nightly    magnesium citrate  296 mL Oral Once    clonazePAM  0.5 mg Oral BID    [Held by provider] amitriptyline  10 mg Oral TID    [Held by provider] amLODIPine  10 mg Oral Nightly    gabapentin  300 mg Oral TID    sodium chloride flush  10 mL Intravenous 2 times per day    busPIRone  20 mg Oral TID    [Held by provider] metoprolol succinate  25 mg Oral Nightly    nicotine  1 patch Transdermal Daily    sodium chloride (PF)  10 mL Intravenous Daily    escitalopram  20 mg Oral Daily     Continuous Infusions:   PN-Adult 2-in-1 Central Line (Standard) 60 mL/hr at 03/16/20 1828    dextrose 5% and 0.45% NaCl with KCl 20 mEq 22 mL/hr at 03/17/20 0533    pantoprozole (PROTONIX) infusion 8 mg/hr (03/17/20 0530)     PRN Meds:   fentanNYL, 25 mcg, Q4H PRN  LORazepam, 1 mg, Q6H   --  237  --   --  234   RBC 2.22*  --  2.54*  --   --  2.42*   HCT 22.7*   < > 25.8* 24.7* 25.2* 24.9*   MCH 31.5  --  31.9  --   --  31.4   MCHC 30.8  --  31.4  --   --  30.5   RDW 15.8*  --  15.9*  --   --  15.7*   MPV 12.0  --  10.9  --   --  11.0    < > = values in this interval not displayed. PT/INR:    Lab Results   Component Value Date    PROTIME 11.0 02/10/2020    INR 1.0 02/10/2020     PTT:  No results found for: APTT, PTT    Basal Metabolic Profile:   Recent Labs     03/15/20  1019 03/16/20  0406 03/17/20  0438    134* 134*   K 4.1 4.2 4.4   BUN 22 17 15   CREATININE 0.69 0.64 0.61    103 104   CO2 25 21 22      Magnesium:   Lab Results   Component Value Date    MG 1.7 03/16/2020    MG 1.9 03/13/2020    MG 2.0 03/11/2020     Phosphorus:   Lab Results   Component Value Date    PHOS 3.8 03/16/2020    PHOS 3.3 03/13/2020    PHOS 3.5 03/11/2020     S. Calcium:  Recent Labs     03/17/20  0438   CALCIUM 8.0*     S. Ionized Calcium:No results for input(s): IONCA in the last 72 hours. Urinalysis:   Lab Results   Component Value Date    NITRU NEGATIVE 02/29/2020    COLORU YELLOW 02/29/2020    PHUR 6.0 02/29/2020    WBCUA None 02/29/2020    RBCUA 2 TO 5 02/29/2020    MUCUS NOT REPORTED 02/29/2020    TRICHOMONAS NOT REPORTED 02/29/2020    YEAST NOT REPORTED 02/29/2020    BACTERIA NOT REPORTED 02/29/2020    CLARITYU Clear 06/29/2018    SPECGRAV 1.059 02/29/2020    LEUKOCYTESUR NEGATIVE 02/29/2020    UROBILINOGEN Normal 02/29/2020    BILIRUBINUR NEGATIVE 02/29/2020    BILIRUBINUR Negative 06/29/2018    BLOODU Non-hemolyzed trace 06/29/2018    GLUCOSEU NEGATIVE 02/29/2020    KETUA NEGATIVE 02/29/2020    AMORPHOUS NOT REPORTED 02/29/2020       CARDIAC ENZYMES: No results for input(s): CKMB, CKMBINDEX, TROPONINI in the last 72 hours. Invalid input(s): CKTOTAL;3  BNP: No results for input(s): BNP in the last 72 hours.     LFTS  No results for input(s): ALKPHOS, ALT, AST, BILITOT, BILIDIR, LABALBU in the last 72 hours. AMYLASE/LIPASE/AMMONIA  No results for input(s): AMYLASE, LIPASE, AMMONIA in the last 72 hours. Last 3 Blood Glucose:   Recent Labs     03/14/20  1624 03/15/20  1019 03/16/20  0406 03/17/20  0438   GLUCOSE 137* 139* 135* 134*      HgBA1c:  No results found for: LABA1C      TSH:  No results found for: TSH  ANEMIA STUDIES  No results for input(s): LABIRON, TIBC, FERRITIN, LTKRVDEW50, FOLATE, OCCULTBLD in the last 72 hours.             Cultures during this admission:     Blood cultures:                 [x] None drawn      [] Negative             []  Positive (Details:  )  Urine Culture:                   [x] None drawn      [] Negative             []  Positive (Details:  )  Sputum Culture:               [x] None drawn       [] Negative             []  Positive (Details:  )   Endotracheal aspirate:     [x] None drawn       [] Negative             []  Positive (Details:  )             Chest Xray (3/17/2020):    ASSESSMENT:     Patient Active Problem List   Diagnosis    Frequency of urination    Severe sepsis ([address]    Back pain    GERD (gastroesophageal reflux disease)    MVP (mitral valve prolapse)    Depression    Anxiety    Urine retention    Acute kidney injury ([address]    Esophageal dysphagia    Paraesophageal hernia    History of repair of hiatal hernia    Aspiration pneumonia ([address]    Respiratory failure ([address]    Centrilobular emphysema ([address]    Atelectasis    Pleural effusion    Esophageal dilatation           PLAN:     Acute hypoxic respiratory failure from possible PE/aspiration/pneumomediastinum  Acute blood loss anemia from possible upper GI bleed from GE junction ulcer  On Protonix drip  Hemoglobin today 9.9   We will do H&H every 6 hours  Will transfuse if hemoglobin less than 7  Undergone EGD showed GE junction ulcer and gastritis    After procedure, High grade fever 40.3, Tachycardic, tachypnea  Switched from High flow to BIPAP  Intubated  Sedated time.     Ventilator setting post PRVC/22/400/5/100% will wean FiO2  ABG ABG was done and reviewed  CXR done and reviewed endotracheal tube was in good position I did not see pneumomediastinum on chest x-ray, basilar atelectasis infiltrate seen as previously  We will give fluid bolus as she is tachycardic. We will get blood culture urinalysis and tracheal aspirate culture. We will start her on broad-spectrum antibiotic with meropenem and vancomycin. We will follow-up H&H later. Carafate was started by surgery and will continue with PPI drip. We will check lactic acid and if elevated then will give her more fluid and then follow-up lactic acid. Will start on propofol drip post and if she does not respond she had responded previously better to Precedex then will start Precedex drip. We will try fentanyl otherwise will start fentanyl drip. We will continue with BuSpar Klonopin Lexapro and trazodone  Will continue with IV fluids. ID Consultation  We will get CT scan of the chest and abdomen pelvis to determine source of infection, discussed with surgery attending Dr. Karissa Campbell he has no suspicion of any leak and does not want to give contrast for CT scan. As she become acutely tachypneic tachycardic and had not been on DVT prophylaxis for days because of GI bleeding we will get a CTA chest for PE protocol along with CT of the abdomen and pelvis.        Discussed with nursing staff, treatment plan discussed  Discussed with the respiratory therapy      Total critical care time caring for this patient with life threatening, unstable organ failure, including direct patient contact, management of life support systems, review of data including imaging and labs, discussions with other team members and physicians at least 39  Min so far today, excluding procedures.        Please note that this chart was generated using voice recognition Dragon dictation software.  Although every effort was made to ensure the accuracy of

## 2020-03-17 NOTE — PROGRESS NOTES
Patient is less responsive. , RR 40s, SpO2 97%. Decision to intubate.  at bedside. Anesthesia also at bedside. Etomidate 14 mg given @ 1326  Succ 100 mg given @ 1326    7.5 ETT placed @ 1327, secured 23 @ lips   + Bilat breath sounds, + color change. CXR ordered STAT.

## 2020-03-17 NOTE — PROGRESS NOTES
Was paged by nursing about patient's clinical status after procedure. Came to bedside. Patient on BIPAP. She states she hurts in her chest. Clinically, she is breathing >40 times per minute, HR in 120s, febrile to 40 F. CXR reviewed ordered by critical care - no signs of pneumomediastinum. There is a left pleural effusion, which appears stable from CXR two days prior. Would not recommend UGI or CT scan at this point, because patient at high risk for aspirating contrast and making matters worse. Will continue to follow. Labs pending from sepsis work up per critical care.     Electronically signed by Brian Palafox DO on 3/17/2020 at 12:22 PM

## 2020-03-17 NOTE — PROGRESS NOTES
Pharmacy Note  Vancomycin Consult    [facility] Doctor is a 76 y.o. female started on Vancomycin for aspiration pneumonia/intraabdominal infection; consult received from Dr. Darlyn Ruiz to manage therapy. Also receiving the following antibiotics: meropenem. Patient Active Problem List   Diagnosis    Frequency of urination    Severe sepsis (HCC)    Back pain    GERD (gastroesophageal reflux disease)    MVP (mitral valve prolapse)    Depression    Anxiety    Urine retention    Acute kidney injury ([facility] [facility]    Esophageal dysphagia    Paraesophageal hernia    History of repair of hiatal hernia    Aspiration pneumonia (HCC)    Respiratory failure (HCC)    Centrilobular emphysema (HCC)    Atelectasis    Pleural effusion    Esophageal dilatation       Allergies:  Compazine [prochlorperazine maleate]; Penicillin v potassium; and Penicillins     Temp max: 100.8F    Recent Labs     03/16/20  0406 03/17/20  0438   BUN 17 15       Recent Labs     03/16/20  0406 03/17/20  0438   CREATININE 0.64 0.61       Recent Labs     03/16/20  0406 03/17/20  0438   WBC 8.1 6.0         Intake/Output Summary (Last 24 hours) at 3/17/2020 1147  Last data filed at 3/17/2020 1100  Gross per 24 hour   Intake 2813.95 ml   Output 4105 ml   Net -1291.05 ml       Culture Date      Source                       Results  03.17.20              MRSA Nares              Pend  03.17.20              Blood                          Pend  03.17.20              Sputum                       Pend    Ht Readings from Last 1 Encounters:   02/29/20 5' 2\" (1.575 m)        Wt Readings from Last 1 Encounters:   03/17/20 205 lb 0.4 oz (93 kg)         Body mass index is 37.5 kg/m². Estimated Creatinine Clearance: 86 mL/min (based on SCr of 0.61 mg/dL). Goal Trough Level: 10-20 mcg/mL    Assessment/Plan:  Will initiate vancomycin 1000 mg IV every 12 hours.   Timing of trough level will be determined based on culture results, renal function, and clinical

## 2020-03-17 NOTE — CARE COORDINATION
Patient now intubated, follow progress.  Prior transition plan - [facility], possible LTACH

## 2020-03-17 NOTE — PROGRESS NOTES
Attending Physician Statement  I have discussed the care of Marcos Maza, including pertinent history and exam findings with the resident. I have reviewed the key elements of all parts of the encounter with the resident. I have seen and examined the patient with the resident. I agree with the assessment and plan and status of the problem list as documented. I have seen the patient during the round today this afternoon, events noted from yesterday and this morning. She remained on high flow nasal cannula 40% and 30 L, she had intermittent low-grade fever with WBC count of 10.2 and she has a hemoglobin of 8.6 without any decrease in hemoglobin and NG tube was reported to be dark bilious material and no active bleeding was seen. She had endoscopy done by surgery service today apparently tolerated procedure well and according to surgery service superficial ulcer seen at the gastroesophageal junction and mild gastritis no active bleeding was seen and they recommended to his start patient on Protonix twice daily and discontinue drip. Later patient become more tachycardic and tachypneic she had a fever spike of 103 apparently was having tremors as she had a history of difficult intubation per anesthesia for her previous surgery anesthesia apparently was called for intubation and she was intubated apparently not a difficult intubation per anesthesia this time. Ventilator setting post PRVC/22/400/5/100% will wean FiO2  ABG ABG was done and reviewed  CXR done and reviewed endotracheal tube was in good position I did not see pneumomediastinum on chest x-ray, basilar atelectasis infiltrate seen as previously  We will give fluid bolus as she is tachycardic. We will get blood culture urinalysis and tracheal aspirate culture. We will start her on broad-spectrum antibiotic with meropenem and vancomycin. We will follow-up H&H later. Carafate was started by surgery and will continue with PPI drip.   We will check lactic acid and if elevated then will give her more fluid and then follow-up lactic acid. Will start on propofol drip post and if she does not respond she had responded previously better to Precedex then will start Precedex drip. We will try fentanyl otherwise will start fentanyl drip. We will continue with BuSpar Klonopin Lexapro and trazodone  Will continue with IV fluids. ID Consultation  We will get CT scan of the chest and abdomen pelvis to determine source of infection, discussed with surgery attending Dr. Marivel Sauer he has no suspicion of any leak and does not want to give contrast for CT scan. As she become acutely tachypneic tachycardic and had not been on DVT prophylaxis for days because of GI bleeding we will get a CTA chest for PE protocol along with CT of the abdomen and pelvis. Discussed with nursing staff, treatment plan discussed  Discussed with the respiratory therapy     Total critical care time caring for this patient with life threatening, unstable organ failure, including direct patient contact, management of life support systems, review of data including imaging and labs, discussions with other team members and physicians at least 39  Min so far today, excluding procedures. Please note that this chart was generated using voice recognition Dragon dictation software. Although every effort was made to ensure the accuracy of this automated transcription, some errors in transcription may have occurred.     Chyna Arceo MD  3/17/2020 1:52 PM

## 2020-03-17 NOTE — PROGRESS NOTES
Had discussion with SON, Mr Karl Ojeda, need for emergent intubation because of worsening respiratory status over phone. Agreed with it. Was confirmed Dr Ann Marie Baker. Barba Bosworth, MD,  Internal Medicine Resident, PGY-2,   [address].   3/17/2020,2:20 PM

## 2020-03-18 ENCOUNTER — APPOINTMENT (OUTPATIENT)
Dept: GENERAL RADIOLOGY | Age: 75
DRG: 004 | End: 2020-03-18
Attending: INTERNAL MEDICINE
Payer: MEDICARE

## 2020-03-18 LAB
ABSOLUTE EOS #: 0.14 K/UL (ref 0–0.44)
ABSOLUTE IMMATURE GRANULOCYTE: 0.14 K/UL (ref 0–0.3)
ABSOLUTE LYMPH #: 0.83 K/UL (ref 1.1–3.7)
ABSOLUTE MONO #: 0.42 K/UL (ref 0.1–1.2)
ADENOVIRUS PCR: NOT DETECTED
ALLEN TEST: POSITIVE
ANION GAP SERPL CALCULATED.3IONS-SCNC: 10 MMOL/L (ref 9–17)
BASOPHILS # BLD: 0 % (ref 0–2)
BASOPHILS ABSOLUTE: 0 K/UL (ref 0–0.2)
BORDETELLA PARAPERTUSSIS: NOT DETECTED
BORDETELLA PERTUSSIS PCR: NOT DETECTED
BUN BLDV-MCNC: 16 MG/DL (ref 8–23)
BUN/CREAT BLD: ABNORMAL (ref 9–20)
CALCIUM SERPL-MCNC: 7.7 MG/DL (ref 8.6–10.4)
CHLAMYDIA PNEUMONIAE BY PCR: NOT DETECTED
CHLORIDE BLD-SCNC: 110 MMOL/L (ref 98–107)
CO2: 20 MMOL/L (ref 20–31)
CORONAVIRUS 229E PCR: NOT DETECTED
CORONAVIRUS HKU1 PCR: NOT DETECTED
CORONAVIRUS NL63 PCR: NOT DETECTED
CORONAVIRUS OC43 PCR: NOT DETECTED
CREAT SERPL-MCNC: 0.64 MG/DL (ref 0.5–0.9)
DIFFERENTIAL TYPE: ABNORMAL
EKG ATRIAL RATE: 120 BPM
EKG Q-T INTERVAL: 288 MS
EKG QRS DURATION: 64 MS
EKG QTC CALCULATION (BAZETT): 398 MS
EKG R AXIS: 61 DEGREES
EKG T AXIS: 36 DEGREES
EKG VENTRICULAR RATE: 115 BPM
EOSINOPHILS RELATIVE PERCENT: 1 % (ref 1–4)
FIO2: 40
GFR AFRICAN AMERICAN: >60 ML/MIN
GFR NON-AFRICAN AMERICAN: >60 ML/MIN
GFR SERPL CREATININE-BSD FRML MDRD: ABNORMAL ML/MIN/{1.73_M2}
GFR SERPL CREATININE-BSD FRML MDRD: ABNORMAL ML/MIN/{1.73_M2}
GLUCOSE BLD-MCNC: 127 MG/DL (ref 65–105)
GLUCOSE BLD-MCNC: 146 MG/DL (ref 65–105)
GLUCOSE BLD-MCNC: 197 MG/DL (ref 70–99)
GLUCOSE BLD-MCNC: 211 MG/DL (ref 74–100)
HCT VFR BLD CALC: 25.1 % (ref 36.3–47.1)
HCT VFR BLD CALC: 25.3 % (ref 36.3–47.1)
HCT VFR BLD CALC: 28.1 % (ref 36.3–47.1)
HEMOGLOBIN: 7.5 G/DL (ref 11.9–15.1)
HEMOGLOBIN: 7.7 G/DL (ref 11.9–15.1)
HEMOGLOBIN: 8.6 G/DL (ref 11.9–15.1)
HUMAN METAPNEUMOVIRUS PCR: NOT DETECTED
IMMATURE GRANULOCYTES: 1 %
INFLUENZA A BY PCR: NOT DETECTED
INFLUENZA A H1 (2009) PCR: NORMAL
INFLUENZA A H1 PCR: NORMAL
INFLUENZA A H3 PCR: NORMAL
INFLUENZA B BY PCR: NOT DETECTED
LACTIC ACID, WHOLE BLOOD: 1.3 MMOL/L (ref 0.7–2.1)
LACTIC ACID, WHOLE BLOOD: 1.5 MMOL/L (ref 0.7–2.1)
LYMPHOCYTES # BLD: 6 % (ref 24–43)
MAGNESIUM: 1.8 MG/DL (ref 1.6–2.6)
MCH RBC QN AUTO: 31.1 PG (ref 25.2–33.5)
MCHC RBC AUTO-ENTMCNC: 29.6 G/DL (ref 28.4–34.8)
MCV RBC AUTO: 105 FL (ref 82.6–102.9)
MODE: ABNORMAL
MONOCYTES # BLD: 3 % (ref 3–12)
MORPHOLOGY: ABNORMAL
MORPHOLOGY: ABNORMAL
MRSA, DNA, NASAL: NORMAL
MYCOPLASMA PNEUMONIAE PCR: NOT DETECTED
NEGATIVE BASE EXCESS, ART: ABNORMAL (ref 0–2)
NRBC AUTOMATED: 0 PER 100 WBC
O2 DEVICE/FLOW/%: ABNORMAL
PARAINFLUENZA 1 PCR: NOT DETECTED
PARAINFLUENZA 2 PCR: NOT DETECTED
PARAINFLUENZA 3 PCR: NOT DETECTED
PARAINFLUENZA 4 PCR: NOT DETECTED
PATIENT TEMP: ABNORMAL
PDW BLD-RTO: 15.7 % (ref 11.8–14.4)
PHOSPHORUS: 3.3 MG/DL (ref 2.6–4.5)
PLATELET # BLD: 241 K/UL (ref 138–453)
PLATELET ESTIMATE: ABNORMAL
PMV BLD AUTO: 11.1 FL (ref 8.1–13.5)
POC HCO3: 25 MMOL/L (ref 21–28)
POC LACTIC ACID: 1.04 MMOL/L (ref 0.56–1.39)
POC O2 SATURATION: 96 % (ref 94–98)
POC PCO2 TEMP: ABNORMAL MM HG
POC PCO2: 35.9 MM HG (ref 35–48)
POC PH TEMP: ABNORMAL
POC PH: 7.45 (ref 7.35–7.45)
POC PO2 TEMP: ABNORMAL MM HG
POC PO2: 75.1 MM HG (ref 83–108)
POSITIVE BASE EXCESS, ART: 1 (ref 0–3)
POTASSIUM SERPL-SCNC: 3.7 MMOL/L (ref 3.7–5.3)
RBC # BLD: 2.41 M/UL (ref 3.95–5.11)
RBC # BLD: ABNORMAL 10*6/UL
RESP SYNCYTIAL VIRUS PCR: NOT DETECTED
RHINO/ENTEROVIRUS PCR: NOT DETECTED
SAMPLE SITE: ABNORMAL
SEG NEUTROPHILS: 89 % (ref 36–65)
SEGMENTED NEUTROPHILS ABSOLUTE COUNT: 12.37 K/UL (ref 1.5–8.1)
SODIUM BLD-SCNC: 140 MMOL/L (ref 135–144)
SPECIMEN DESCRIPTION: NORMAL
SPECIMEN DESCRIPTION: NORMAL
TCO2 (CALC), ART: 26 MMOL/L (ref 22–29)
WBC # BLD: 13.9 K/UL (ref 3.5–11.3)
WBC # BLD: ABNORMAL 10*3/UL

## 2020-03-18 PROCEDURE — C9113 INJ PANTOPRAZOLE SODIUM, VIA: HCPCS | Performed by: STUDENT IN AN ORGANIZED HEALTH CARE EDUCATION/TRAINING PROGRAM

## 2020-03-18 PROCEDURE — 2500000003 HC RX 250 WO HCPCS: Performed by: STUDENT IN AN ORGANIZED HEALTH CARE EDUCATION/TRAINING PROGRAM

## 2020-03-18 PROCEDURE — 97110 THERAPEUTIC EXERCISES: CPT

## 2020-03-18 PROCEDURE — 82947 ASSAY GLUCOSE BLOOD QUANT: CPT

## 2020-03-18 PROCEDURE — 80048 BASIC METABOLIC PNL TOTAL CA: CPT

## 2020-03-18 PROCEDURE — 2580000003 HC RX 258: Performed by: STUDENT IN AN ORGANIZED HEALTH CARE EDUCATION/TRAINING PROGRAM

## 2020-03-18 PROCEDURE — 6370000000 HC RX 637 (ALT 250 FOR IP): Performed by: STUDENT IN AN ORGANIZED HEALTH CARE EDUCATION/TRAINING PROGRAM

## 2020-03-18 PROCEDURE — 6360000002 HC RX W HCPCS: Performed by: STUDENT IN AN ORGANIZED HEALTH CARE EDUCATION/TRAINING PROGRAM

## 2020-03-18 PROCEDURE — 6360000002 HC RX W HCPCS: Performed by: EMERGENCY MEDICINE

## 2020-03-18 PROCEDURE — 99291 CRITICAL CARE FIRST HOUR: CPT | Performed by: INTERNAL MEDICINE

## 2020-03-18 PROCEDURE — 82803 BLOOD GASES ANY COMBINATION: CPT

## 2020-03-18 PROCEDURE — 85025 COMPLETE CBC W/AUTO DIFF WBC: CPT

## 2020-03-18 PROCEDURE — 2000000000 HC ICU R&B

## 2020-03-18 PROCEDURE — 99024 POSTOP FOLLOW-UP VISIT: CPT | Performed by: SURGERY

## 2020-03-18 PROCEDURE — 85018 HEMOGLOBIN: CPT

## 2020-03-18 PROCEDURE — 94770 HC ETCO2 MONITOR DAILY: CPT

## 2020-03-18 PROCEDURE — 83605 ASSAY OF LACTIC ACID: CPT

## 2020-03-18 PROCEDURE — 94761 N-INVAS EAR/PLS OXIMETRY MLT: CPT

## 2020-03-18 PROCEDURE — 83735 ASSAY OF MAGNESIUM: CPT

## 2020-03-18 PROCEDURE — 2700000000 HC OXYGEN THERAPY PER DAY

## 2020-03-18 PROCEDURE — 94640 AIRWAY INHALATION TREATMENT: CPT

## 2020-03-18 PROCEDURE — 94003 VENT MGMT INPAT SUBQ DAY: CPT

## 2020-03-18 PROCEDURE — 6370000000 HC RX 637 (ALT 250 FOR IP): Performed by: INTERNAL MEDICINE

## 2020-03-18 PROCEDURE — 99222 1ST HOSP IP/OBS MODERATE 55: CPT | Performed by: INTERNAL MEDICINE

## 2020-03-18 PROCEDURE — 93010 ELECTROCARDIOGRAM REPORT: CPT | Performed by: INTERNAL MEDICINE

## 2020-03-18 PROCEDURE — 2580000003 HC RX 258: Performed by: INTERNAL MEDICINE

## 2020-03-18 PROCEDURE — 36415 COLL VENOUS BLD VENIPUNCTURE: CPT

## 2020-03-18 PROCEDURE — 84100 ASSAY OF PHOSPHORUS: CPT

## 2020-03-18 PROCEDURE — 85014 HEMATOCRIT: CPT

## 2020-03-18 PROCEDURE — 71045 X-RAY EXAM CHEST 1 VIEW: CPT

## 2020-03-18 PROCEDURE — 36600 WITHDRAWAL OF ARTERIAL BLOOD: CPT

## 2020-03-18 PROCEDURE — 6370000000 HC RX 637 (ALT 250 FOR IP): Performed by: EMERGENCY MEDICINE

## 2020-03-18 RX ORDER — NICOTINE POLACRILEX 4 MG
15 LOZENGE BUCCAL PRN
Status: DISCONTINUED | OUTPATIENT
Start: 2020-03-18 | End: 2020-04-06 | Stop reason: HOSPADM

## 2020-03-18 RX ORDER — DEXTROSE MONOHYDRATE 25 G/50ML
12.5 INJECTION, SOLUTION INTRAVENOUS PRN
Status: DISCONTINUED | OUTPATIENT
Start: 2020-03-18 | End: 2020-04-06 | Stop reason: HOSPADM

## 2020-03-18 RX ORDER — DEXTROSE MONOHYDRATE 50 MG/ML
100 INJECTION, SOLUTION INTRAVENOUS PRN
Status: DISCONTINUED | OUTPATIENT
Start: 2020-03-18 | End: 2020-04-06 | Stop reason: HOSPADM

## 2020-03-18 RX ORDER — ALBUTEROL SULFATE 2.5 MG/3ML
2.5 SOLUTION RESPIRATORY (INHALATION) EVERY 6 HOURS PRN
Status: DISCONTINUED | OUTPATIENT
Start: 2020-03-18 | End: 2020-04-06 | Stop reason: HOSPADM

## 2020-03-18 RX ADMIN — IPRATROPIUM BROMIDE AND ALBUTEROL SULFATE 1 AMPULE: .5; 3 SOLUTION RESPIRATORY (INHALATION) at 03:15

## 2020-03-18 RX ADMIN — INSULIN LISPRO 1 UNITS: 100 INJECTION, SOLUTION INTRAVENOUS; SUBCUTANEOUS at 17:14

## 2020-03-18 RX ADMIN — METOCLOPRAMIDE 10 MG: 5 INJECTION, SOLUTION INTRAMUSCULAR; INTRAVENOUS at 00:11

## 2020-03-18 RX ADMIN — DEXMEDETOMIDINE HYDROCHLORIDE 0.9 MCG/KG/HR: 100 INJECTION, SOLUTION INTRAVENOUS at 12:33

## 2020-03-18 RX ADMIN — CLONAZEPAM 0.5 MG: 0.5 TABLET ORAL at 20:35

## 2020-03-18 RX ADMIN — MEROPENEM 1 G: 1 INJECTION, POWDER, FOR SOLUTION INTRAVENOUS at 05:30

## 2020-03-18 RX ADMIN — ENOXAPARIN SODIUM 40 MG: 40 INJECTION SUBCUTANEOUS at 15:00

## 2020-03-18 RX ADMIN — SUCRALFATE 1 G: 1 TABLET ORAL at 13:27

## 2020-03-18 RX ADMIN — FENTANYL CITRATE 50 MCG: 50 INJECTION, SOLUTION INTRAMUSCULAR; INTRAVENOUS at 23:21

## 2020-03-18 RX ADMIN — SODIUM CHLORIDE: 9 INJECTION, SOLUTION INTRAVENOUS at 00:04

## 2020-03-18 RX ADMIN — SUCRALFATE 1 G: 1 TABLET ORAL at 16:55

## 2020-03-18 RX ADMIN — GABAPENTIN 300 MG: 300 CAPSULE ORAL at 13:31

## 2020-03-18 RX ADMIN — I.V. FAT EMULSION 100 ML: 20 EMULSION INTRAVENOUS at 18:38

## 2020-03-18 RX ADMIN — DEXMEDETOMIDINE HYDROCHLORIDE 0.9 MCG/KG/HR: 100 INJECTION, SOLUTION INTRAVENOUS at 23:43

## 2020-03-18 RX ADMIN — METOCLOPRAMIDE 10 MG: 5 INJECTION, SOLUTION INTRAMUSCULAR; INTRAVENOUS at 23:34

## 2020-03-18 RX ADMIN — SUCRALFATE 1 G: 1 TABLET ORAL at 00:49

## 2020-03-18 RX ADMIN — ACETAMINOPHEN 650 MG: 325 TABLET ORAL at 23:25

## 2020-03-18 RX ADMIN — METOCLOPRAMIDE 10 MG: 5 INJECTION, SOLUTION INTRAMUSCULAR; INTRAVENOUS at 06:01

## 2020-03-18 RX ADMIN — SODIUM CHLORIDE 1000 ML: 9 INJECTION, SOLUTION INTRAVENOUS at 00:04

## 2020-03-18 RX ADMIN — FENTANYL CITRATE 50 MCG: 50 INJECTION, SOLUTION INTRAMUSCULAR; INTRAVENOUS at 20:26

## 2020-03-18 RX ADMIN — CLONAZEPAM 0.5 MG: 0.5 TABLET ORAL at 09:06

## 2020-03-18 RX ADMIN — ACETAMINOPHEN 650 MG: 325 TABLET ORAL at 16:54

## 2020-03-18 RX ADMIN — PANTOPRAZOLE SODIUM 40 MG: 40 INJECTION, POWDER, FOR SOLUTION INTRAVENOUS at 20:27

## 2020-03-18 RX ADMIN — CALCIUM GLUCONATE: 98 INJECTION, SOLUTION INTRAVENOUS at 18:38

## 2020-03-18 RX ADMIN — DEXMEDETOMIDINE HYDROCHLORIDE 0.9 MCG/KG/HR: 100 INJECTION, SOLUTION INTRAVENOUS at 02:17

## 2020-03-18 RX ADMIN — VANCOMYCIN HYDROCHLORIDE 1000 MG: 1 INJECTION, SOLUTION INTRAVENOUS at 00:49

## 2020-03-18 RX ADMIN — FENTANYL CITRATE 50 MCG: 50 INJECTION, SOLUTION INTRAMUSCULAR; INTRAVENOUS at 09:03

## 2020-03-18 RX ADMIN — PANTOPRAZOLE SODIUM 40 MG: 40 INJECTION, POWDER, FOR SOLUTION INTRAVENOUS at 08:56

## 2020-03-18 RX ADMIN — DEXMEDETOMIDINE HYDROCHLORIDE 0.9 MCG/KG/HR: 100 INJECTION, SOLUTION INTRAVENOUS at 07:47

## 2020-03-18 RX ADMIN — GABAPENTIN 300 MG: 300 CAPSULE ORAL at 20:35

## 2020-03-18 RX ADMIN — BUSPIRONE HYDROCHLORIDE 20 MG: 10 TABLET ORAL at 20:35

## 2020-03-18 RX ADMIN — MEROPENEM 1 G: 1 INJECTION, POWDER, FOR SOLUTION INTRAVENOUS at 13:18

## 2020-03-18 RX ADMIN — METOCLOPRAMIDE 10 MG: 5 INJECTION, SOLUTION INTRAMUSCULAR; INTRAVENOUS at 13:25

## 2020-03-18 RX ADMIN — Medication 10 ML: at 20:27

## 2020-03-18 RX ADMIN — FENTANYL CITRATE 50 MCG: 50 INJECTION, SOLUTION INTRAMUSCULAR; INTRAVENOUS at 17:05

## 2020-03-18 RX ADMIN — Medication 1000 MG: at 13:50

## 2020-03-18 RX ADMIN — METOCLOPRAMIDE 10 MG: 5 INJECTION, SOLUTION INTRAMUSCULAR; INTRAVENOUS at 16:55

## 2020-03-18 RX ADMIN — TRAZODONE HYDROCHLORIDE 100 MG: 100 TABLET ORAL at 20:35

## 2020-03-18 RX ADMIN — DEXMEDETOMIDINE HYDROCHLORIDE 0.9 MCG/KG/HR: 100 INJECTION, SOLUTION INTRAVENOUS at 18:06

## 2020-03-18 RX ADMIN — SUCRALFATE 1 G: 1 TABLET ORAL at 06:01

## 2020-03-18 RX ADMIN — Medication 2 MG/HR: at 18:02

## 2020-03-18 RX ADMIN — GABAPENTIN 300 MG: 300 CAPSULE ORAL at 08:55

## 2020-03-18 RX ADMIN — SUCRALFATE 1 G: 1 TABLET ORAL at 23:26

## 2020-03-18 RX ADMIN — ESCITALOPRAM OXALATE 20 MG: 10 TABLET ORAL at 08:55

## 2020-03-18 RX ADMIN — BUSPIRONE HYDROCHLORIDE 20 MG: 10 TABLET ORAL at 08:55

## 2020-03-18 RX ADMIN — FENTANYL CITRATE 50 MCG: 50 INJECTION, SOLUTION INTRAMUSCULAR; INTRAVENOUS at 15:10

## 2020-03-18 RX ADMIN — BUSPIRONE HYDROCHLORIDE 20 MG: 10 TABLET ORAL at 13:31

## 2020-03-18 RX ADMIN — MEROPENEM 1 G: 1 INJECTION, POWDER, FOR SOLUTION INTRAVENOUS at 20:26

## 2020-03-18 RX ADMIN — Medication 10 ML: at 08:56

## 2020-03-18 RX ADMIN — IPRATROPIUM BROMIDE AND ALBUTEROL SULFATE 1 AMPULE: .5; 3 SOLUTION RESPIRATORY (INHALATION) at 08:11

## 2020-03-18 RX ADMIN — BISACODYL 10 MG: 10 SUPPOSITORY RECTAL at 08:56

## 2020-03-18 ASSESSMENT — PULMONARY FUNCTION TESTS
PIF_VALUE: 17
PIF_VALUE: 10
PIF_VALUE: 8
PIF_VALUE: 8
PIF_VALUE: 16
PIF_VALUE: 13
PIF_VALUE: 7
PIF_VALUE: 14
PIF_VALUE: 10
PIF_VALUE: 14
PIF_VALUE: 11
PIF_VALUE: 10
PIF_VALUE: 11
PIF_VALUE: 16
PIF_VALUE: 20
PIF_VALUE: 20
PIF_VALUE: 21
PIF_VALUE: 9
PIF_VALUE: 15
PIF_VALUE: 19
PIF_VALUE: 10
PIF_VALUE: 20
PIF_VALUE: 12
PIF_VALUE: 17
PIF_VALUE: 19
PIF_VALUE: 7
PIF_VALUE: 14
PIF_VALUE: 20
PIF_VALUE: 12
PIF_VALUE: 19
PIF_VALUE: 14
PIF_VALUE: 18
PIF_VALUE: 14
PIF_VALUE: 9
PIF_VALUE: 19
PIF_VALUE: 19

## 2020-03-18 NOTE — PROGRESS NOTES
40 mg Intravenous BID    And    sodium chloride (PF)  10 mL Intravenous BID    sodium chloride  20 mL Intravenous Once    metoclopramide  10 mg Intravenous Q6H    milk and molasses  240 mL Rectal Once    bisacodyl  10 mg Rectal Daily    lidocaine 1 % injection  5 mL Intradermal Once    sodium chloride flush  10 mL Intravenous 2 times per day    traZODone  100 mg Oral Nightly    magnesium citrate  296 mL Oral Once    clonazePAM  0.5 mg Oral BID    [Held by provider] amitriptyline  10 mg Oral TID    [Held by provider] amLODIPine  10 mg Oral Nightly    gabapentin  300 mg Oral TID    sodium chloride flush  10 mL Intravenous 2 times per day    busPIRone  20 mg Oral TID    [Held by provider] metoprolol succinate  25 mg Oral Nightly    nicotine  1 patch Transdermal Daily    sodium chloride (PF)  10 mL Intravenous Daily    escitalopram  20 mg Oral Daily     Continuous Infusions:   sodium chloride 75 mL/hr at 03/18/20 0004    PN-Adult 2-in-1 Central Line (Standard) 60 mL/hr at 03/17/20 1832    norepinephrine 3 mcg/min (03/18/20 0620)    midazolam 2 mg/hr (03/18/20 0929)    dexmedetomidine (PRECEDEX) IV infusion 0.9 mcg/kg/hr (03/18/20 0747)     PRN Meds:   fentanNYL, 50 mcg, Q2H PRN  LORazepam, 1 mg, Q6H PRN  dextromethorphan-guaiFENesin, 10 mL, Q4H PRN  albuterol, 2.5 mg, Q6H PRN  sodium chloride flush, 10 mL, PRN  potassium chloride, 40 mEq, PRN    Or  potassium alternative oral replacement, 40 mEq, PRN    Or  potassium chloride, 10 mEq, PRN  magnesium hydroxide, 30 mL, Daily PRN  sodium chloride flush, 10 mL, PRN  acetaminophen, 650 mg, Q6H PRN    Or  acetaminophen, 650 mg, Q6H PRN  polyethylene glycol, 17 g, Daily PRN  glycerin moisturizing mouth spray, 2 spray, PRN  [Held by provider] metoprolol, 5 mg, Q6H PRN  ondansetron, 4 mg, Q6H PRN        SUPPORT DEVICES: [x] Ventilator [] BIPAP  [] Nasal Cannula [] Room Air    VENT SETTINGS (Comprehensive) (if applicable):   PRVC Fi02% 40, Rate 22, Tv 400, with life threatening, unstable organ failure, including direct patient contact, management of life support systems, review of data including imaging and labs, discussions with other team members and physicians at least 27  Min so far today, excluding procedures. Please note that this chart was generated using voice recognition Dragon dictation software. Although every effort was made to ensure the accuracy of this automated transcription, some errors in transcription may have occurred.           Ashia Edgar MD  3/18/2020 6:07 PM

## 2020-03-18 NOTE — PROGRESS NOTES
Surgery Progress Note            PATIENT NAME: Liu Frederick     TODAY'S DATE: 3/18/2020, 7:19 AM    SUBJECTIVE:    Pt seen and examined at bedside. On minimal pressor support. Afebrile overnight, but was febrile to 40.1 yesterday. Ulcer noted on scope. OBJECTIVE:   VITALS:  BP (!) 114/58   Pulse 64   Temp 97.7 °F (36.5 °C) (Core)   Resp (!) 34   Ht 5' 2\" (1.575 m)   Wt 199 lb 8.3 oz (90.5 kg)   SpO2 100%   BMI 36.49 kg/m²      INTAKE/OUTPUT:      Intake/Output Summary (Last 24 hours) at 3/18/2020 0719  Last data filed at 3/18/2020 5073  Gross per 24 hour   Intake 6030.9 ml   Output 4725 ml   Net 1305.9 ml       PHYSICAL EXAM  Constitutional:  Sedated  HEENT:      Head: Normocephalic. Atraumatic     Eyes: pupils are equal and reactive. Cardiovascular: Regular rate and rhythm  Pulmonary/Chest: tachypnic, regular rate and rhythm  Abdominal: Soft. Non distended, non tender to palpation  Musculoskeletal: equal ROM  Neurological: No gross motor deficits equally  Skin: Skin is warm, dry and intact. S/p hiatal hernia repair with syeda funduplication on 9/16/63  Pneumonia- AM CXR, Abx per primary  Anxiety  Anemia  GE junction ulcer     Plan:  1. Continue TPN  2. NPO with NGT to LIS   3. EGD performed at bedside: Appears to be an ulcer at the GE junction and gastritis  4. Add carafate 4 times a day  5. Ok to do protonix BID instead of drip  6. Continue bowel regimen  7. Medical management per primary team.      Electronically signed by Latosha Matias DO  on 3/18/2020 at 7:19 AM     Patient seen and examined. Agree with above A/P. Events of yesterday noted and reviewed with CC attending yesterday. Unsure as to etiology of high fever yesterday but this has resolved. Vent weaning per CCM.   Will continue with NG and GI risk reduction regimen

## 2020-03-18 NOTE — PLAN OF CARE
Problem: Falls - Risk of:  Goal: Will remain free from falls  Description: Will remain free from falls  3/18/2020 1525 by Mustapha Rodriguez RN  Outcome: Met This Shift  Problem: Pain:  Goal: Pain level will decrease  Description: Pain level will decrease  3/18/2020 1525 by Mustapha Rodriguez RN  Outcome: Ongoing     Problem: Anxiety/Stress:  Goal: Level of anxiety will decrease  Description: Level of anxiety will decrease  3/18/2020 1525 by Mustapha Rodriguez RN  Outcome: Ongoing  Problem: Nutrition Deficit:  Goal: Ability to achieve adequate nutritional intake will improve  Description: Ability to achieve adequate nutritional intake will improve  3/18/2020 1525 by Mustapha Rodriguez RN  Outcome: Ongoing  Problem: Skin Integrity - Impaired:  Goal: Will show no infection signs and symptoms  Description: Will show no infection signs and symptoms  3/18/2020 1525 by Mustapha Rodriguez RN  Outcome: Ongoing  Problem: Falls - Risk of:  Goal: Absence of physical injury  Description: Absence of physical injury  Problem: Restraint Use - Nonviolent/Non-Self-Destructive Behavior:  Goal: Absence of restraint indications  Description: Absence of restraint indications  3/18/2020 1525 by Anaya Rodriguez RN  Outcome: Ongoing

## 2020-03-18 NOTE — PROGRESS NOTES
03/18/20 1139   Vent Information   Vent Mode CPAP   Pressure Support 8 cmH20   FiO2  30 %   Sensitivity 3   PEEP/CPAP 5     Started wean 113

## 2020-03-18 NOTE — PLAN OF CARE
BRONCHOSPASM/BRONCHOCONSTRICTION     [x]         IMPROVE AERATION/BREATH SOUNDS  [x]   ADMINISTER BRONCHODILATOR THERAPY AS APPROPRIATE  [x]   ASSESS BREATH SOUNDS  [x]   IMPLEMENT AEROSOL/MDI PROTOCOL  [x]   PATIENT EDUCATION AS NEEDED     PROVIDE ADEQUATE OXYGENATION WITH ACCEPTABLE SP02/ABG'S    [x]  IDENTIFY APPROPRIATE OXYGEN THERAPY  [x]   MONITOR SP02/ABG'S AS NEEDED   [x]   PATIENT EDUCATION AS NEEDED     Problem: OXYGENATION/RESPIRATORY FUNCTION  Goal: Patient will maintain patent airway  3/18/2020 1041 by Sam Harper RCP  Outcome: Ongoing  3/18/2020 0805 by Emy Martinez RN  Outcome: Ongoing     Problem: OXYGENATION/RESPIRATORY FUNCTION  Goal: Patient will achieve/maintain normal respiratory rate/effort  Description: Respiratory rate and effort will be within normal limits for the patient  3/18/2020 1041 by Sam Harper RCP  Outcome: Ongoing  3/18/2020 0805 by Emy Martinez RN  Outcome: Ongoing     Problem: MECHANICAL VENTILATION  Goal: Patient will maintain patent airway  3/18/2020 1041 by Sam Harper RCP  Outcome: Ongoing  3/18/2020 0805 by Emy Martinez RN  Outcome: Ongoing     Problem: MECHANICAL VENTILATION  Goal: Oral health is maintained or improved  3/18/2020 1041 by Sam Harper RCP  Outcome: Ongoing  3/18/2020 0805 by Emy Martinez RN  Outcome: Ongoing     Problem: MECHANICAL VENTILATION  Goal: ET tube will be managed safely  3/18/2020 1041 by Sam Harper RCP  Outcome: Ongoing  3/18/2020 0805 by Emy Martinez RN  Outcome: Ongoing     Problem: MECHANICAL VENTILATION  Goal: Ability to express needs and understand communication  3/18/2020 1041 by Sam Harper RCP  Outcome: Ongoing  3/18/2020 0805 by Emy Martinez RN  Outcome: Ongoing     Problem: MECHANICAL VENTILATION  Goal: Mobility/activity is maintained at optimum level for patient  3/18/2020 1041 by Sam Harper RCP  Outcome: Ongoing  3/18/2020 0805 by Emy Martinez RN  Outcome: Ongoing    Ventilator Bronchodilator assessment    Breath sounds: Clear, Diminished   Inspiratory Pressure: 21  Plateau Pressure: 20    Patient assessed at level 1. Pt does not not have a history of COPD  Pt does not show signs of respiratory distress          [x]    Bronchodilator Assessment    BRONCHODILATOR ASSESSMENT SCORE  Score 0 (Home) 1 2 3 4   Breath Sounds   []  Chronic Ventilator: Patient at baseline [x]  Mild Wheezes/ Clear []  Intermittent wheezes with good air entry []  Bilateral/unilateral wheezing with diminished air entry []  Insp/Exp wheeze and/or poor aeration   Ventilator Pressures   []  Chronic Ventilator [x]  Insp. Pressure less than 25 cm H20 []  Insp. Pressure less than 25 cm H20 []  Insp. Pressure exceeds 25 cm H20 []  Insp.  Pressure exceeds 30 cm H20   Plateau Pressure []  NA   [x]  Plateau Pressure less than 4  []  Plateau Pressure less than or equal to 5 []  Plateau Pressure greater than or equal to 6 []  Plateau Pressure greater than or equal to [facility]  10:43 AM

## 2020-03-18 NOTE — PLAN OF CARE
Problem: Pain:  Goal: Pain level will decrease  Description: Pain level will decrease  Outcome: Ongoing  Goal: Control of acute pain  Description: Control of acute pain  Outcome: Ongoing  Goal: Control of chronic pain  Description: Control of chronic pain  Outcome: Ongoing     Problem: Discharge Planning:  Goal: Participates in care planning  Description: Participates in care planning  Outcome: Ongoing  Goal: Discharged to appropriate level of care  Description: Discharged to appropriate level of care  Outcome: Ongoing     Problem: Anxiety/Stress:  Goal: Level of anxiety will decrease  Description: Level of anxiety will decrease  Outcome: Ongoing     Problem: Bowel Function - Altered:  Goal: Bowel elimination is within specified parameters  Description: Bowel elimination is within specified parameters  Outcome: Ongoing     Problem: Nutrition Deficit:  Goal: Ability to achieve adequate nutritional intake will improve  Description: Ability to achieve adequate nutritional intake will improve  Outcome: Ongoing     Problem: Pain:  Goal: Recognizes and communicates pain  Description: Recognizes and communicates pain  Outcome: Ongoing  Goal: Control of acute pain  Description: Control of acute pain  Outcome: Ongoing  Goal: Control of chronic pain  Description: Control of chronic pain  Outcome: Ongoing     Problem: Skin Integrity - Impaired:  Goal: Will show no infection signs and symptoms  Description: Will show no infection signs and symptoms  Outcome: Ongoing  Goal: Absence of new skin breakdown  Description: Absence of new skin breakdown  Outcome: Ongoing     Problem: Sleep Pattern Disturbance:  Goal: Appears well-rested  Description: Appears well-rested  Outcome: Ongoing     Problem: Risk for Impaired Skin Integrity  Goal: Tissue integrity - skin and mucous membranes  Description: Structural intactness and normal physiological function of skin and  mucous membranes.   Outcome: Ongoing     Problem: Falls - Risk of:  Goal: Will remain free from falls  Description: Will remain free from falls  Outcome: Ongoing  Goal: Absence of physical injury  Description: Absence of physical injury  Outcome: Ongoing     Problem: OXYGENATION/RESPIRATORY FUNCTION  Goal: Patient will maintain patent airway  Outcome: Ongoing  Goal: Patient will achieve/maintain normal respiratory rate/effort  Description: Respiratory rate and effort will be within normal limits for the patient  Outcome: Ongoing     Problem: MECHANICAL VENTILATION  Goal: Patient will maintain patent airway  Outcome: Ongoing  Goal: Oral health is maintained or improved  Outcome: Ongoing  Goal: ET tube will be managed safely  Outcome: Ongoing  Goal: Ability to express needs and understand communication  Outcome: Ongoing  Goal: Mobility/activity is maintained at optimum level for patient  Outcome: Ongoing     Problem: SKIN INTEGRITY  Goal: Skin integrity is maintained or improved  Outcome: Ongoing     Problem: Nutrition  Goal: Optimal nutrition therapy  Outcome: Ongoing     Problem: Musculor/Skeletal Functional Status  Goal: Highest potential functional level  Outcome: Ongoing

## 2020-03-19 LAB
ABSOLUTE EOS #: 0.18 K/UL (ref 0–0.4)
ABSOLUTE IMMATURE GRANULOCYTE: 0 K/UL (ref 0–0.3)
ABSOLUTE LYMPH #: 1.25 K/UL (ref 1–4.8)
ABSOLUTE MONO #: 0.53 K/UL (ref 0.1–0.8)
ALBUMIN SERPL-MCNC: 1.4 G/DL (ref 3.5–5.2)
ALBUMIN/GLOBULIN RATIO: 0.4 (ref 1–2.5)
ALLEN TEST: POSITIVE
ALP BLD-CCNC: 122 U/L (ref 35–104)
ALT SERPL-CCNC: 81 U/L (ref 5–33)
ANION GAP SERPL CALCULATED.3IONS-SCNC: 10 MMOL/L (ref 9–17)
AST SERPL-CCNC: 26 U/L
BASOPHILS # BLD: 0 % (ref 0–2)
BASOPHILS ABSOLUTE: 0 K/UL (ref 0–0.2)
BILIRUB SERPL-MCNC: 0.18 MG/DL (ref 0.3–1.2)
BUN BLDV-MCNC: 15 MG/DL (ref 8–23)
BUN/CREAT BLD: ABNORMAL (ref 9–20)
CALCIUM SERPL-MCNC: 7.7 MG/DL (ref 8.6–10.4)
CHLORIDE BLD-SCNC: 104 MMOL/L (ref 98–107)
CO2: 21 MMOL/L (ref 20–31)
CREAT SERPL-MCNC: 0.55 MG/DL (ref 0.5–0.9)
CULTURE: ABNORMAL
DIFFERENTIAL TYPE: ABNORMAL
DIRECT EXAM: ABNORMAL
EOSINOPHILS RELATIVE PERCENT: 2 % (ref 1–4)
FIO2: 40
GFR AFRICAN AMERICAN: >60 ML/MIN
GFR NON-AFRICAN AMERICAN: >60 ML/MIN
GFR SERPL CREATININE-BSD FRML MDRD: ABNORMAL ML/MIN/{1.73_M2}
GFR SERPL CREATININE-BSD FRML MDRD: ABNORMAL ML/MIN/{1.73_M2}
GLUCOSE BLD-MCNC: 102 MG/DL (ref 65–105)
GLUCOSE BLD-MCNC: 120 MG/DL (ref 65–105)
GLUCOSE BLD-MCNC: 134 MG/DL (ref 70–99)
GLUCOSE BLD-MCNC: 144 MG/DL (ref 74–100)
GLUCOSE BLD-MCNC: 91 MG/DL (ref 65–105)
HCT VFR BLD CALC: 24.3 % (ref 36.3–47.1)
HCT VFR BLD CALC: 27.8 % (ref 36.3–47.1)
HCT VFR BLD CALC: 29.6 % (ref 36.3–47.1)
HEMOGLOBIN: 7.3 G/DL (ref 11.9–15.1)
HEMOGLOBIN: 8.3 G/DL (ref 11.9–15.1)
HEMOGLOBIN: 9.3 G/DL (ref 11.9–15.1)
IMMATURE GRANULOCYTES: 0 %
LYMPHOCYTES # BLD: 14 % (ref 24–44)
Lab: ABNORMAL
Lab: ABNORMAL
MCH RBC QN AUTO: 30.8 PG (ref 25.2–33.5)
MCHC RBC AUTO-ENTMCNC: 30 G/DL (ref 28.4–34.8)
MCV RBC AUTO: 102.5 FL (ref 82.6–102.9)
MODE: NORMAL
MONOCYTES # BLD: 6 % (ref 1–7)
MORPHOLOGY: ABNORMAL
NEGATIVE BASE EXCESS, ART: NORMAL (ref 0–2)
NRBC AUTOMATED: 0.2 PER 100 WBC
O2 DEVICE/FLOW/%: NORMAL
PATIENT TEMP: NORMAL
PDW BLD-RTO: 15.1 % (ref 11.8–14.4)
PLATELET # BLD: 192 K/UL (ref 138–453)
PLATELET ESTIMATE: ABNORMAL
PMV BLD AUTO: 11 FL (ref 8.1–13.5)
POC HCO3: 24.3 MMOL/L (ref 21–28)
POC O2 SATURATION: 97 % (ref 94–98)
POC PCO2 TEMP: NORMAL MM HG
POC PCO2: 36.8 MM HG (ref 35–48)
POC PH TEMP: NORMAL
POC PH: 7.43 (ref 7.35–7.45)
POC PO2 TEMP: NORMAL MM HG
POC PO2: 89.5 MM HG (ref 83–108)
POSITIVE BASE EXCESS, ART: 0 (ref 0–3)
POTASSIUM SERPL-SCNC: 3.6 MMOL/L (ref 3.7–5.3)
RBC # BLD: 2.37 M/UL (ref 3.95–5.11)
RBC # BLD: ABNORMAL 10*6/UL
SAMPLE SITE: NORMAL
SEG NEUTROPHILS: 78 % (ref 36–66)
SEGMENTED NEUTROPHILS ABSOLUTE COUNT: 6.94 K/UL (ref 1.8–7.7)
SODIUM BLD-SCNC: 135 MMOL/L (ref 135–144)
SPECIMEN DESCRIPTION: ABNORMAL
SPECIMEN DESCRIPTION: ABNORMAL
TCO2 (CALC), ART: 26 MMOL/L (ref 22–29)
TOTAL PROTEIN: 4.9 G/DL (ref 6.4–8.3)
WBC # BLD: 8.9 K/UL (ref 3.5–11.3)
WBC # BLD: ABNORMAL 10*3/UL

## 2020-03-19 PROCEDURE — 80053 COMPREHEN METABOLIC PANEL: CPT

## 2020-03-19 PROCEDURE — 85025 COMPLETE CBC W/AUTO DIFF WBC: CPT

## 2020-03-19 PROCEDURE — 94761 N-INVAS EAR/PLS OXIMETRY MLT: CPT

## 2020-03-19 PROCEDURE — 6360000002 HC RX W HCPCS: Performed by: STUDENT IN AN ORGANIZED HEALTH CARE EDUCATION/TRAINING PROGRAM

## 2020-03-19 PROCEDURE — 2700000000 HC OXYGEN THERAPY PER DAY

## 2020-03-19 PROCEDURE — C9113 INJ PANTOPRAZOLE SODIUM, VIA: HCPCS | Performed by: STUDENT IN AN ORGANIZED HEALTH CARE EDUCATION/TRAINING PROGRAM

## 2020-03-19 PROCEDURE — 6370000000 HC RX 637 (ALT 250 FOR IP): Performed by: STUDENT IN AN ORGANIZED HEALTH CARE EDUCATION/TRAINING PROGRAM

## 2020-03-19 PROCEDURE — 2580000003 HC RX 258: Performed by: STUDENT IN AN ORGANIZED HEALTH CARE EDUCATION/TRAINING PROGRAM

## 2020-03-19 PROCEDURE — 82947 ASSAY GLUCOSE BLOOD QUANT: CPT

## 2020-03-19 PROCEDURE — 82803 BLOOD GASES ANY COMBINATION: CPT

## 2020-03-19 PROCEDURE — 2000000000 HC ICU R&B

## 2020-03-19 PROCEDURE — 2500000003 HC RX 250 WO HCPCS: Performed by: STUDENT IN AN ORGANIZED HEALTH CARE EDUCATION/TRAINING PROGRAM

## 2020-03-19 PROCEDURE — 6360000002 HC RX W HCPCS: Performed by: HOSPITALIST

## 2020-03-19 PROCEDURE — 36620 INSERTION CATHETER ARTERY: CPT

## 2020-03-19 PROCEDURE — 99291 CRITICAL CARE FIRST HOUR: CPT | Performed by: INTERNAL MEDICINE

## 2020-03-19 PROCEDURE — 94770 HC ETCO2 MONITOR DAILY: CPT

## 2020-03-19 PROCEDURE — 36600 WITHDRAWAL OF ARTERIAL BLOOD: CPT

## 2020-03-19 PROCEDURE — 94003 VENT MGMT INPAT SUBQ DAY: CPT

## 2020-03-19 PROCEDURE — 85018 HEMOGLOBIN: CPT

## 2020-03-19 PROCEDURE — 36415 COLL VENOUS BLD VENIPUNCTURE: CPT

## 2020-03-19 PROCEDURE — 99233 SBSQ HOSP IP/OBS HIGH 50: CPT | Performed by: INTERNAL MEDICINE

## 2020-03-19 PROCEDURE — 6360000002 HC RX W HCPCS: Performed by: EMERGENCY MEDICINE

## 2020-03-19 PROCEDURE — 85014 HEMATOCRIT: CPT

## 2020-03-19 RX ORDER — ACETAMINOPHEN 650 MG/1
650 SUPPOSITORY RECTAL EVERY 6 HOURS PRN
Status: DISCONTINUED | OUTPATIENT
Start: 2020-03-19 | End: 2020-04-06 | Stop reason: HOSPADM

## 2020-03-19 RX ORDER — FENTANYL CITRATE 50 UG/ML
50 INJECTION, SOLUTION INTRAMUSCULAR; INTRAVENOUS
Status: DISCONTINUED | OUTPATIENT
Start: 2020-03-19 | End: 2020-03-19

## 2020-03-19 RX ORDER — ACETAMINOPHEN 325 MG/1
650 TABLET ORAL EVERY 4 HOURS PRN
Status: DISCONTINUED | OUTPATIENT
Start: 2020-03-19 | End: 2020-04-06 | Stop reason: HOSPADM

## 2020-03-19 RX ADMIN — FENTANYL CITRATE 50 MCG: 50 INJECTION, SOLUTION INTRAMUSCULAR; INTRAVENOUS at 12:28

## 2020-03-19 RX ADMIN — METOPROLOL TARTRATE 5 MG: 5 INJECTION, SOLUTION INTRAVENOUS at 22:31

## 2020-03-19 RX ADMIN — ENOXAPARIN SODIUM 40 MG: 40 INJECTION SUBCUTANEOUS at 08:16

## 2020-03-19 RX ADMIN — GABAPENTIN 300 MG: 300 CAPSULE ORAL at 08:15

## 2020-03-19 RX ADMIN — GABAPENTIN 300 MG: 300 CAPSULE ORAL at 20:32

## 2020-03-19 RX ADMIN — TRAZODONE HYDROCHLORIDE 100 MG: 100 TABLET ORAL at 20:32

## 2020-03-19 RX ADMIN — BUSPIRONE HYDROCHLORIDE 20 MG: 10 TABLET ORAL at 08:14

## 2020-03-19 RX ADMIN — METOCLOPRAMIDE 10 MG: 5 INJECTION, SOLUTION INTRAMUSCULAR; INTRAVENOUS at 18:07

## 2020-03-19 RX ADMIN — MEROPENEM 1 G: 1 INJECTION, POWDER, FOR SOLUTION INTRAVENOUS at 20:07

## 2020-03-19 RX ADMIN — FENTANYL CITRATE 50 MCG: 50 INJECTION, SOLUTION INTRAMUSCULAR; INTRAVENOUS at 06:38

## 2020-03-19 RX ADMIN — MEROPENEM 1 G: 1 INJECTION, POWDER, FOR SOLUTION INTRAVENOUS at 04:00

## 2020-03-19 RX ADMIN — SUCRALFATE 1 G: 1 TABLET ORAL at 18:06

## 2020-03-19 RX ADMIN — CLONAZEPAM 0.5 MG: 0.5 TABLET ORAL at 20:32

## 2020-03-19 RX ADMIN — FENTANYL CITRATE 50 MCG: 50 INJECTION, SOLUTION INTRAMUSCULAR; INTRAVENOUS at 11:28

## 2020-03-19 RX ADMIN — SODIUM CHLORIDE, PRESERVATIVE FREE 10 ML: 5 INJECTION INTRAVENOUS at 20:32

## 2020-03-19 RX ADMIN — SUCRALFATE 1 G: 1 TABLET ORAL at 11:27

## 2020-03-19 RX ADMIN — Medication 50 MCG/HR: at 13:58

## 2020-03-19 RX ADMIN — DEXMEDETOMIDINE HYDROCHLORIDE 1 MCG/KG/HR: 100 INJECTION, SOLUTION INTRAVENOUS at 03:47

## 2020-03-19 RX ADMIN — ACETAMINOPHEN 650 MG: 325 TABLET ORAL at 11:15

## 2020-03-19 RX ADMIN — METOCLOPRAMIDE 10 MG: 5 INJECTION, SOLUTION INTRAMUSCULAR; INTRAVENOUS at 11:27

## 2020-03-19 RX ADMIN — ACETAMINOPHEN 650 MG: 325 TABLET ORAL at 18:05

## 2020-03-19 RX ADMIN — DEXMEDETOMIDINE HYDROCHLORIDE 0.7 MCG/KG/HR: 100 INJECTION, SOLUTION INTRAVENOUS at 12:19

## 2020-03-19 RX ADMIN — FENTANYL CITRATE 50 MCG: 50 INJECTION, SOLUTION INTRAMUSCULAR; INTRAVENOUS at 02:51

## 2020-03-19 RX ADMIN — FENTANYL CITRATE 50 MCG: 50 INJECTION, SOLUTION INTRAMUSCULAR; INTRAVENOUS at 04:01

## 2020-03-19 RX ADMIN — Medication 125 MCG/HR: at 21:52

## 2020-03-19 RX ADMIN — SUCRALFATE 1 G: 1 TABLET ORAL at 23:42

## 2020-03-19 RX ADMIN — LORAZEPAM 1 MG: 2 INJECTION INTRAMUSCULAR at 22:00

## 2020-03-19 RX ADMIN — SUCRALFATE 1 G: 1 TABLET ORAL at 05:25

## 2020-03-19 RX ADMIN — METOCLOPRAMIDE 10 MG: 5 INJECTION, SOLUTION INTRAMUSCULAR; INTRAVENOUS at 05:19

## 2020-03-19 RX ADMIN — GABAPENTIN 300 MG: 300 CAPSULE ORAL at 14:01

## 2020-03-19 RX ADMIN — CLONAZEPAM 0.5 MG: 0.5 TABLET ORAL at 13:39

## 2020-03-19 RX ADMIN — I.V. FAT EMULSION 100 ML: 20 EMULSION INTRAVENOUS at 18:06

## 2020-03-19 RX ADMIN — PANTOPRAZOLE SODIUM 40 MG: 40 INJECTION, POWDER, FOR SOLUTION INTRAVENOUS at 20:56

## 2020-03-19 RX ADMIN — BUSPIRONE HYDROCHLORIDE 20 MG: 10 TABLET ORAL at 20:32

## 2020-03-19 RX ADMIN — FENTANYL CITRATE 50 MCG: 50 INJECTION, SOLUTION INTRAMUSCULAR; INTRAVENOUS at 01:20

## 2020-03-19 RX ADMIN — LORAZEPAM 1 MG: 2 INJECTION INTRAMUSCULAR at 01:10

## 2020-03-19 RX ADMIN — Medication 10 ML: at 20:57

## 2020-03-19 RX ADMIN — DEXMEDETOMIDINE HYDROCHLORIDE 1 MCG/KG/HR: 100 INJECTION, SOLUTION INTRAVENOUS at 08:08

## 2020-03-19 RX ADMIN — ACETAMINOPHEN 650 MG: 325 TABLET ORAL at 05:25

## 2020-03-19 RX ADMIN — BISACODYL 10 MG: 10 SUPPOSITORY RECTAL at 08:14

## 2020-03-19 RX ADMIN — ESCITALOPRAM OXALATE 20 MG: 10 TABLET ORAL at 08:16

## 2020-03-19 RX ADMIN — MEROPENEM 1 G: 1 INJECTION, POWDER, FOR SOLUTION INTRAVENOUS at 12:15

## 2020-03-19 RX ADMIN — CALCIUM GLUCONATE: 98 INJECTION, SOLUTION INTRAVENOUS at 18:15

## 2020-03-19 RX ADMIN — BUSPIRONE HYDROCHLORIDE 20 MG: 10 TABLET ORAL at 14:02

## 2020-03-19 RX ADMIN — SODIUM CHLORIDE: 9 INJECTION, SOLUTION INTRAVENOUS at 03:00

## 2020-03-19 RX ADMIN — METOCLOPRAMIDE 10 MG: 5 INJECTION, SOLUTION INTRAMUSCULAR; INTRAVENOUS at 23:42

## 2020-03-19 ASSESSMENT — PULMONARY FUNCTION TESTS
PIF_VALUE: 10
PIF_VALUE: 8
PIF_VALUE: 17
PIF_VALUE: 18
PIF_VALUE: 14
PIF_VALUE: 13
PIF_VALUE: 18
PIF_VALUE: 18
PIF_VALUE: 7
PIF_VALUE: 6
PIF_VALUE: 13
PIF_VALUE: 12
PIF_VALUE: 14
PIF_VALUE: 15
PIF_VALUE: 14
PIF_VALUE: 6
PIF_VALUE: 14
PIF_VALUE: 11
PIF_VALUE: 9
PIF_VALUE: 10
PIF_VALUE: 10
PIF_VALUE: 9
PIF_VALUE: 22
PIF_VALUE: 11
PIF_VALUE: 12
PIF_VALUE: 9
PIF_VALUE: 11
PIF_VALUE: 10
PIF_VALUE: 19
PIF_VALUE: 10
PIF_VALUE: 13
PIF_VALUE: 17
PIF_VALUE: 12
PIF_VALUE: 17
PIF_VALUE: 14
PIF_VALUE: 10
PIF_VALUE: 8
PIF_VALUE: 16
PIF_VALUE: 10
PIF_VALUE: 11
PIF_VALUE: 11
PIF_VALUE: 18
PIF_VALUE: 10
PIF_VALUE: 6
PIF_VALUE: 17
PIF_VALUE: 14
PIF_VALUE: 21
PIF_VALUE: 12
PIF_VALUE: 16
PIF_VALUE: 22
PIF_VALUE: 14
PIF_VALUE: 12
PIF_VALUE: 14
PIF_VALUE: 10
PIF_VALUE: 18
PIF_VALUE: 15
PIF_VALUE: 6
PIF_VALUE: 10
PIF_VALUE: 10
PIF_VALUE: 16
PIF_VALUE: 7
PIF_VALUE: 10
PIF_VALUE: 11
PIF_VALUE: 5
PIF_VALUE: 10
PIF_VALUE: 13
PIF_VALUE: 17
PIF_VALUE: 10
PIF_VALUE: 7
PIF_VALUE: 13
PIF_VALUE: 13
PIF_VALUE: 10
PIF_VALUE: 9
PIF_VALUE: 9
PIF_VALUE: 17
PIF_VALUE: 9
PIF_VALUE: 6
PIF_VALUE: 9
PIF_VALUE: 7
PIF_VALUE: 13
PIF_VALUE: 16
PIF_VALUE: 9
PIF_VALUE: 13
PIF_VALUE: 11
PIF_VALUE: 11
PIF_VALUE: 9
PIF_VALUE: 20
PIF_VALUE: 11

## 2020-03-19 NOTE — PROGRESS NOTES
Critical Care Team - Daily Progress Note      Date and time: 3/19/2020 7:33 AM  Patient's name:  Cece Farmer  Medical Record Number: [id number]  Patient's account/billing number: [de-identified]  Patient's YOB: 1945  Age: 76 y.o. Date of Admission: 2/29/2020 10:37 AM  Length of stay during current admission: 19  Primary Care Physician: Holly Fontana MD  ICU Attending Physician: Dr. Wilner Cross Status: Full Code    Reason for ICU admission: ACUTE RESPIRATORY FAILURE; UPPER GI BLEED; RECENT NISSEN      SUBJECTIVE:   OVERNIGHT EVENTS:     No issues overnight noted. Patient is alert and oriented x3, following commands. Patient is still on fentanyl 50 every 1 hour, Versed 3 and Precedex 1 mcg/kg. She is on normal saline 75 mL/h and TPN 65 mL/h. She was febrile with a T-max of 102 Fahrenheit, Responded well to Tylenol and ice packs. She is still on Levophed 2. Urine output  2 L in last 24 hours with positive fluid balance of 1854. She is intubated, on PRVC mode 22/400/5/40.     AWAKE & FOLLOWING COMMANDS:  [] No   [x] Yes    CURRENT VENTILATION STATUS:     [x] Ventilator  [] BIPAP  [] Nasal Cannula [] Room Air      SECRETIONS Amount:  [x] Small [] Moderate  [] Large  [] None  Color:     [] White [x] Colored  [] Bloody    SEDATION:  RAAS Score:  [] Propofol gtt  [x] Versed gtt  [] Ativan gtt   [] No Sedation    PARALYZED:  [x] No    [] Yes    DIARRHEA:                [x] No                [] Yes  (C. Difficile status: [] positive                                                                                                                       [] negative                                                                                                                     [] pending)    VASOPRESSORS:  [] No    [x] Yes    If yes -   [x] Levophed       [] Dopamine     [] Vasopressin       [] Dobutamine  [] Phenylephrine         [] Epinephrine    CENTRAL LINES:     [x] No  RUE PICC line [] Yes   (Date of Insertion:   )           If yes -     [] Right IJ     [] Left IJ [] Right Femoral [] Left Femoral                   [] Right Subclavian [] Left Subclavian       DUDLEY'S CATHETER:   [] No   [x] Yes  (Date of Insertion:   )     URINE OUTPUT:            [x] Good  [] Low              [] Anuric    OBJECTIVE:     VITAL SIGNS:  BP (!) 111/56   Pulse 65   Temp 100.8 °F (38.2 °C) (Core)   Resp 29   Ht 5' 2\" (1.575 m)   Wt 209 lb 10.5 oz (95.1 kg)   SpO2 98%   BMI 38.35 kg/m²   Tmax over 24 hours:  Temp (24hrs), Av.3 °F (37.9 °C), Min:97.2 °F (36.2 °C), Max:102.6 °F (39.2 °C)      Patient Vitals for the past 8 hrs:   BP Temp Temp src Pulse Resp SpO2 Weight   20 0645 (!) 111/56 -- -- 65 29 98 % --   20 0630 (!) 115/57 -- -- 66 (!) 31 99 % --   20 0615 (!) 117/49 -- -- 66 29 99 % --   20 0600 (!) 120/50 -- -- 67 30 99 % --   20 0545 (!) 120/51 -- -- 67 (!) 31 99 % --   20 0541 -- -- -- -- 30 99 % --   20 0530 (!) 116/55 -- -- 68 (!) 31 99 % --   20 0515 (!) 120/49 -- -- 68 (!) 31 99 % 209 lb 10.5 oz (95.1 kg)   20 0500 (!) 111/47 -- -- 68 (!) 32 98 % --   20 0445 (!) 110/52 -- -- 64 29 98 % --   20 0430 103/66 -- -- 63 28 98 % --   20 0415 108/62 -- -- 68 27 98 % --   20 0400 (!) 117/59 100.8 °F (38.2 °C) CORE 63 28 98 % --   20 0345 (!) 109/55 -- -- 65 28 97 % --   20 0330 (!) 115/52 -- -- 64 27 97 % --   20 0315 128/61 -- -- 71 30 98 % --   20 0300 125/62 -- -- 71 (!) 32 98 % --   20 0245 (!) 123/59 -- -- 70 (!) 33 98 % --   20 0230 (!) 127/59 -- -- 68 30 99 % --   20 0215 (!) 122/59 -- -- 69 (!) 32 98 % --   20 0200 (!) 123/58 -- -- 71 29 98 % --   20 0145 (!) 120/56 -- -- 72 (!) 31 98 % --   20 0130 (!) 117/57 -- -- 72 27 98 % --   20 0115 (!) 118/54 -- -- 70 (!) 31 98 % --   20 0100 131/62 -- -- 73 28 99 % --   20 0045 127/60 -- -- 75 28 99 % --   20

## 2020-03-19 NOTE — PROGRESS NOTES
Nutrition Assessment (Parenteral Nutrition)    Type and Reason for Visit: Reassess    Nutrition Recommendations:   Increased K in PN. Recommend Vital Af (Semielemental)  Trickle tube feed at 10 ml per hour. Nutrition Assessment: Vent and TPN continue. No diprivan . Trickle Tube feed to start. Malnutrition Assessment:  · Malnutrition Status: At risk for malnutrition  · Context: Acute illness or injury  · Findings of the 6 clinical characteristics of malnutrition (Minimum of 2 out of 6 clinical characteristics is required to make the diagnosis of moderate or severe Protein Calorie Malnutrition based on AND/ASPEN Guidelines):  1. Energy Intake-Greater than 75% of estimated energy requirement, Greater than or equal to 5 days(with start of TPN)    2. Weight Loss-No significant weight loss, (3 weeks)  3. Fat Loss-Mild subcutaneous fat loss, Orbital  4. Muscle Loss-No significant muscle mass loss,    5. Fluid Accumulation-(Mild to moderate fluid accumulation), Extremities, Generalized  6.  Strength-Not measured    Nutrition Risk Level: High    Nutrient Needs:  · Estimated Daily Total Kcal: 7873-4261 kcal/day  · Estimated Daily Protein (g):  g pro/day     Nutrition Diagnosis:   · Problem: Inadequate oral intake  · Etiology: related to Impaired respiratory function-inability to consume food, Alteration in GI function, Nausea, Vomiting     Signs and symptoms:  as evidenced by NPO status due to medical condition, Nutrition support - EN, Nutrition support - PN    Objective Information:  · Nutrition-Focused Physical Findings: Last BM 3/15.   · Wound Type: Surgical Wound  · Current Nutrition Therapies:  · Oral Diet Orders: NPO   · Parenteral Nutrition Orders:  · Type and Formula: (300 g Dex, 90 g AA )   · Lipids: 100ml, Daily  · Rate/Volume: 60 mL/hr   · Duration: Continuous  · Goal PN Orders Provides: 1580 kcal, 90 g protein  · Anthropometric Measures:  · Ht: 5' 2\" (157.5 cm)   · Current Body Wt: 209 lb

## 2020-03-19 NOTE — PROGRESS NOTES
[facility])  Occupational Therapy Not Seen Note    DATE: 3/19/2020  Name: Bismark Montes  : 1945  MRN: [id number]    Patient not available for Occupational Therapy due to:    [] Testing:    [] Hemodialysis    [] Blood Transfusion in Progress    []Refusal by Patient:    [] Surgery/Procedure:    [] Strict Bedrest    [] Sedation    [] Spine Precautions     [] Pt being transferred to palliative care at this time. Spoke with pt/family and OT services to be defered. [] Pt independent with functional mobility and functional tasks.  Pt with no OT acute care needs at this time, will defer OT eval.    [x] Other: Intubated/sedated (fentanyl)    Next Scheduled Treatment: Will check 3/20/2020    Seymour Gaming OTR/L

## 2020-03-19 NOTE — PLAN OF CARE
Problem: Pain:  Goal: Pain level will decrease  Description: Pain level will decrease  Outcome: Ongoing  Goal: Control of acute pain  Description: Control of acute pain  Outcome: Ongoing  Goal: Control of chronic pain  Description: Control of chronic pain  Outcome: Ongoing     Problem: Discharge Planning:  Goal: Participates in care planning  Description: Participates in care planning  Outcome: Ongoing  Goal: Discharged to appropriate level of care  Description: Discharged to appropriate level of care  Outcome: Ongoing     Problem: Anxiety/Stress:  Goal: Level of anxiety will decrease  Description: Level of anxiety will decrease  Outcome: Ongoing     Problem: Bowel Function - Altered:  Goal: Bowel elimination is within specified parameters  Description: Bowel elimination is within specified parameters  Outcome: Ongoing     Problem: Nutrition Deficit:  Goal: Ability to achieve adequate nutritional intake will improve  Description: Ability to achieve adequate nutritional intake will improve  Outcome: Ongoing     Problem: Pain:  Goal: Recognizes and communicates pain  Description: Recognizes and communicates pain  Outcome: Ongoing  Goal: Control of acute pain  Description: Control of acute pain  Outcome: Ongoing  Goal: Control of chronic pain  Description: Control of chronic pain  Outcome: Ongoing     Problem: Skin Integrity - Impaired:  Goal: Will show no infection signs and symptoms  Description: Will show no infection signs and symptoms  Outcome: Ongoing  Goal: Absence of new skin breakdown  Description: Absence of new skin breakdown  Outcome: Ongoing     Problem: Sleep Pattern Disturbance:  Goal: Appears well-rested  Description: Appears well-rested  Outcome: Ongoing     Problem: Risk for Impaired Skin Integrity  Goal: Tissue integrity - skin and mucous membranes  Description: Structural intactness and normal physiological function of skin and  mucous membranes.   Outcome: Ongoing     Problem: Falls - Risk INTEGRITY  Goal: Skin integrity is maintained or improved  3/19/2020 1845 by Greg Butler RN  Outcome: Ongoing  3/19/2020 0822 by Mary Beal RCP  Outcome: Ongoing     Problem: Nutrition  Goal: Optimal nutrition therapy  Outcome: Ongoing     Problem: Musculor/Skeletal Functional Status  Goal: Highest potential functional level  Outcome: Ongoing     Problem: Restraint Use - Nonviolent/Non-Self-Destructive Behavior:  Goal: Absence of restraint indications  Description: Absence of restraint indications  Outcome: Ongoing  Goal: Absence of restraint-related injury  Description: Absence of restraint-related injury  Outcome: Ongoing

## 2020-03-19 NOTE — PLAN OF CARE
tube will be managed safely  3/19/2020 0242 by Kashif Martinez RN  Outcome: Ongoing  3/18/2020 1932 by Carmina Buerger, RCP  Outcome: Ongoing  Goal: Ability to express needs and understand communication  3/19/2020 0242 by Kashif Martinez RN  Outcome: Ongoing  3/18/2020 1932 by Carmina Buerger, RCP  Outcome: Ongoing  Goal: Mobility/activity is maintained at optimum level for patient  3/19/2020 0242 by Kashif Martinez RN  Outcome: Ongoing  3/18/2020 1932 by Carmina Buerger, RCP  Outcome: Ongoing     Problem: SKIN INTEGRITY  Goal: Skin integrity is maintained or improved  3/19/2020 0242 by Kashif Martinez RN  Outcome: Ongoing  3/18/2020 1932 by Carmina Buerger, RCP  Outcome: Ongoing     Problem: Nutrition  Goal: Optimal nutrition therapy  Outcome: Ongoing     Problem: Musculor/Skeletal Functional Status  Goal: Highest potential functional level  Outcome: Ongoing     Problem: Restraint Use - Nonviolent/Non-Self-Destructive Behavior:  Goal: Absence of restraint indications  Description: Absence of restraint indications  3/19/2020 0242 by Kashif Martinez RN  Outcome: Not Met This Shift  3/18/2020 1525 by Robert Elizabeth RN  Outcome: Ongoing

## 2020-03-19 NOTE — PROGRESS NOTES
[facility] -Progress Note    Today's Date and Time: 3/19/2020, 9:30 AM    Impression :     · Acute respiratory failure s/p intubation 3-17  · S/p EGD 3-17  · PUD  · GERD  · S/P hiatal hernia repair and nisson fundoplication 7-23-99  · Pleural effusion  · Acute blood loss anemia  · BENEDICT    Recommendations:   · Continue IV meropenem day 3  · Discontinue Vancomycin  · No treatment for candida in urine or sputum at this time, likely represents colonization and normal teresa  · Supportive care  · Monitor clinical progression  · Low probability of COVID, no need to test at this time    Medical Decision Making/Summary/Discussion:3/19/2020     ·   Infection Control Recommendations   · [place] Precautions      Antimicrobial Stewardship Recommendations     · Simplification of therapy  · Targeted therapy    Coordination of Outpatient Care:   · Estimated Length of IV antimicrobials:TBD  · Patient will need Midline Catheter Insertion: TBD  · Patient will need PICC line Insertion:  · Patient will need: Home IV , [place],  SNF,  LTAC:TBD  · Patient will need outpatient wound care:TBD    Chief complaint/reason for consultation:    High grade fever    History of Present Illness:   Mortimer Guys is a 76y.o.-year-old  female who was initially admitted on 2/29/2020. Patient seen at the request of Dr. Vin Kowalski:    Patient has a history of severe GERD despite being on PPI, H2 blocker and Tums. She underwent laparoscopic robotic hiatal hernia repair and fundoplication last month on 02/24/2020 and postoperatively she developed hypoxia. CT chest done on 02/26/2020 showed bibasilar atelectasis/consolidation and patchy area of infiltrate in the right upper lobe. Patient was discharged on home oxygen on 2-28. She presented back to [facility] ER on 2-29 with worsening shortness of breath, dyspnea as well as an episode of dark red emesis. Her SaO2 was 60 % in the ED.  There 296 mL Oral Once    clonazePAM  0.5 mg Oral BID    [Held by provider] amitriptyline  10 mg Oral TID    [Held by provider] amLODIPine  10 mg Oral Nightly    gabapentin  300 mg Oral TID    sodium chloride flush  10 mL Intravenous 2 times per day    busPIRone  20 mg Oral TID    [Held by provider] metoprolol succinate  25 mg Oral Nightly    nicotine  1 patch Transdermal Daily    sodium chloride (PF)  10 mL Intravenous Daily    escitalopram  20 mg Oral Daily       Social History:     Social History     Socioeconomic History    Marital status:       Spouse name: Not on file    Number of children: 2    Years of education: Not on file    Highest education level: Not on file   Occupational History    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   Social Needs    Financial resource strain: Not on file    Food insecurity     Worry: Not on file     Inability: Not on file   [employer] needs     Medical: Not on file     Non-medical: Not on file   Tobacco Use    Smoking status: Current Every Day Smoker     Packs/day: 1.00     Years: 50.00     Pack years: 50.00     Types: Cigarettes    Smokeless tobacco: Never Used   Substance and Sexual Activity    Alcohol use: No    Drug use: No    Sexual activity: Never   Lifestyle    Physical activity     Days per week: Not on file     Minutes per session: Not on file    Stress: Not on file   Relationships    Social connections     Talks on phone: Not on file     Gets together: Not on file     Attends Yazidism service: Not on file     Active member of club or organization: Not on file     Attends meetings of clubs or organizations: Not on file     Relationship status: Not on file    Intimate partner violence     Fear of current or ex partner: Not on file     Emotionally abused: Not on file     Physically abused: Not on file     Forced sexual activity: Not on file   Other Topics Concern    Not on file   Social History Narrative    Not on file       Family History:     Family History   Problem Relation Age of Onset    Cancer Mother         uterine    Heart Attack Mother     Heart Attack Father     Other Maternal Grandfather         foot gangrene    Other Paternal Grandmother         bleeding ulcers    Other Paternal Grandfather         lung cancer        Allergies:   Compazine [prochlorperazine maleate]; Penicillin v potassium; and Penicillins     Review of Systems:     3/19/2020 UTO, pt intubated, sedated. Constitutional: No fevers or chills. No systemic complaints  Head: No headaches  Eyes: No double vision or blurry vision. No conjunctival inflammation. ENT: No sore throat or runny nose. . No hearing loss, tinnitus or vertigo. Cardiovascular: No chest pain or palpitations. No shortness of breath. No HOLLAND  Lung: No shortness of breath or cough. No sputum production  Abdomen: No nausea, vomiting, diarrhea, or abdominal pain. Francisca Mantis No cramps. Genitourinary: No increased urinary frequency, or dysuria. No hematuria. No suprapubic or CVA pain  Musculoskeletal: No muscle aches or pains. No joint effusions, swelling or deformities  Hematologic: No bleeding or bruising. Neurologic: No headache, weakness, numbness, or tingling. Integument: No rash, no ulcers. Psychiatric: No depression. Endocrine: No polyuria, no polydipsia, no polyphagia.     Physical Examination :     Patient Vitals for the past 8 hrs:   BP Temp Temp src Pulse Resp SpO2 Weight   03/19/20 0915 (!) 117/50 -- -- 72 (!) 31 98 % --   03/19/20 0900 (!) 116/51 -- -- 67 (!) 31 98 % --   03/19/20 0845 (!) 115/51 -- -- 67 30 99 % --   03/19/20 0830 125/61 -- -- 70 30 99 % --   03/19/20 0815 (!) 118/59 -- -- 68 (!) 31 99 % --   03/19/20 0800 (!) 106/49 99.9 °F (37.7 °C) Oral 69 30 99 % --   03/19/20 0745 102/60 -- -- 72 (!) 34 98 % --   03/19/20 0740 -- -- -- 67 30 98 % --   03/19/20 0730 (!) 115/56 -- -- 66 30 98 % --   03/19/20 0645 (!) 111/56 -- -- 65 29 98 % --   03/19/20 0630 (!) 115/57 -- -- 66 (!) 31 99 % --   03/19/20

## 2020-03-20 ENCOUNTER — APPOINTMENT (OUTPATIENT)
Dept: GENERAL RADIOLOGY | Age: 75
DRG: 004 | End: 2020-03-20
Attending: INTERNAL MEDICINE
Payer: MEDICARE

## 2020-03-20 LAB
ABSOLUTE EOS #: 0.1 K/UL (ref 0–0.4)
ABSOLUTE IMMATURE GRANULOCYTE: 0.19 K/UL (ref 0–0.3)
ABSOLUTE LYMPH #: 0.68 K/UL (ref 1–4.8)
ABSOLUTE MONO #: 0.29 K/UL (ref 0.1–0.8)
ALLEN TEST: ABNORMAL
ANION GAP SERPL CALCULATED.3IONS-SCNC: 10 MMOL/L (ref 9–17)
BASOPHILS # BLD: 0 % (ref 0–2)
BASOPHILS ABSOLUTE: 0 K/UL (ref 0–0.2)
BUN BLDV-MCNC: 14 MG/DL (ref 8–23)
BUN/CREAT BLD: ABNORMAL (ref 9–20)
CALCIUM SERPL-MCNC: 8 MG/DL (ref 8.6–10.4)
CHLORIDE BLD-SCNC: 100 MMOL/L (ref 98–107)
CO2: 24 MMOL/L (ref 20–31)
CREAT SERPL-MCNC: 0.46 MG/DL (ref 0.5–0.9)
DIFFERENTIAL TYPE: ABNORMAL
EOSINOPHILS RELATIVE PERCENT: 1 % (ref 1–4)
FIO2: 50
GFR AFRICAN AMERICAN: >60 ML/MIN
GFR NON-AFRICAN AMERICAN: >60 ML/MIN
GFR SERPL CREATININE-BSD FRML MDRD: ABNORMAL ML/MIN/{1.73_M2}
GFR SERPL CREATININE-BSD FRML MDRD: ABNORMAL ML/MIN/{1.73_M2}
GLUCOSE BLD-MCNC: 121 MG/DL (ref 65–105)
GLUCOSE BLD-MCNC: 126 MG/DL (ref 65–105)
GLUCOSE BLD-MCNC: 127 MG/DL (ref 70–99)
GLUCOSE BLD-MCNC: 130 MG/DL (ref 65–105)
GLUCOSE BLD-MCNC: 133 MG/DL (ref 65–105)
GLUCOSE BLD-MCNC: 136 MG/DL (ref 74–100)
HCT VFR BLD CALC: 23.1 % (ref 36.3–47.1)
HCT VFR BLD CALC: 26.1 % (ref 36.3–47.1)
HCT VFR BLD CALC: 26.4 % (ref 36.3–47.1)
HEMOGLOBIN: 7.4 G/DL (ref 11.9–15.1)
HEMOGLOBIN: 8.1 G/DL (ref 11.9–15.1)
HEMOGLOBIN: 8.2 G/DL (ref 11.9–15.1)
IMMATURE GRANULOCYTES: 2 %
LYMPHOCYTES # BLD: 7 % (ref 24–44)
MAGNESIUM: 1.7 MG/DL (ref 1.6–2.6)
MCH RBC QN AUTO: 30.3 PG (ref 25.2–33.5)
MCHC RBC AUTO-ENTMCNC: 31 G/DL (ref 28.4–34.8)
MCV RBC AUTO: 97.8 FL (ref 82.6–102.9)
MODE: ABNORMAL
MONOCYTES # BLD: 3 % (ref 1–7)
MORPHOLOGY: ABNORMAL
MORPHOLOGY: ABNORMAL
NEGATIVE BASE EXCESS, ART: ABNORMAL (ref 0–2)
NRBC AUTOMATED: 0 PER 100 WBC
O2 DEVICE/FLOW/%: ABNORMAL
PATIENT TEMP: ABNORMAL
PDW BLD-RTO: 14.7 % (ref 11.8–14.4)
PHOSPHORUS: 3.1 MG/DL (ref 2.6–4.5)
PLATELET # BLD: 228 K/UL (ref 138–453)
PLATELET ESTIMATE: ABNORMAL
PMV BLD AUTO: 10.8 FL (ref 8.1–13.5)
POC HCO3: 28 MMOL/L (ref 21–28)
POC LACTIC ACID: 0.84 MMOL/L (ref 0.56–1.39)
POC O2 SATURATION: 92 % (ref 94–98)
POC PCO2 TEMP: ABNORMAL MM HG
POC PCO2: 44.3 MM HG (ref 35–48)
POC PH TEMP: ABNORMAL
POC PH: 7.41 (ref 7.35–7.45)
POC PO2 TEMP: ABNORMAL MM HG
POC PO2: 62.4 MM HG (ref 83–108)
POSITIVE BASE EXCESS, ART: 3 (ref 0–3)
POTASSIUM SERPL-SCNC: 3.5 MMOL/L (ref 3.7–5.3)
RBC # BLD: 2.67 M/UL (ref 3.95–5.11)
RBC # BLD: ABNORMAL 10*6/UL
SAMPLE SITE: ABNORMAL
SEG NEUTROPHILS: 87 % (ref 36–66)
SEGMENTED NEUTROPHILS ABSOLUTE COUNT: 8.44 K/UL (ref 1.8–7.7)
SODIUM BLD-SCNC: 134 MMOL/L (ref 135–144)
TCO2 (CALC), ART: 29 MMOL/L (ref 22–29)
WBC # BLD: 9.7 K/UL (ref 3.5–11.3)
WBC # BLD: ABNORMAL 10*3/UL

## 2020-03-20 PROCEDURE — 71045 X-RAY EXAM CHEST 1 VIEW: CPT

## 2020-03-20 PROCEDURE — 6370000000 HC RX 637 (ALT 250 FOR IP): Performed by: STUDENT IN AN ORGANIZED HEALTH CARE EDUCATION/TRAINING PROGRAM

## 2020-03-20 PROCEDURE — C9113 INJ PANTOPRAZOLE SODIUM, VIA: HCPCS | Performed by: STUDENT IN AN ORGANIZED HEALTH CARE EDUCATION/TRAINING PROGRAM

## 2020-03-20 PROCEDURE — 2580000003 HC RX 258: Performed by: STUDENT IN AN ORGANIZED HEALTH CARE EDUCATION/TRAINING PROGRAM

## 2020-03-20 PROCEDURE — 6360000002 HC RX W HCPCS: Performed by: STUDENT IN AN ORGANIZED HEALTH CARE EDUCATION/TRAINING PROGRAM

## 2020-03-20 PROCEDURE — 2000000000 HC ICU R&B

## 2020-03-20 PROCEDURE — 82803 BLOOD GASES ANY COMBINATION: CPT

## 2020-03-20 PROCEDURE — 6360000002 HC RX W HCPCS: Performed by: EMERGENCY MEDICINE

## 2020-03-20 PROCEDURE — 80048 BASIC METABOLIC PNL TOTAL CA: CPT

## 2020-03-20 PROCEDURE — 6370000000 HC RX 637 (ALT 250 FOR IP): Performed by: INTERNAL MEDICINE

## 2020-03-20 PROCEDURE — 99233 SBSQ HOSP IP/OBS HIGH 50: CPT | Performed by: INTERNAL MEDICINE

## 2020-03-20 PROCEDURE — 37799 UNLISTED PX VASCULAR SURGERY: CPT

## 2020-03-20 PROCEDURE — 99291 CRITICAL CARE FIRST HOUR: CPT | Performed by: INTERNAL MEDICINE

## 2020-03-20 PROCEDURE — 97110 THERAPEUTIC EXERCISES: CPT

## 2020-03-20 PROCEDURE — 2500000003 HC RX 250 WO HCPCS: Performed by: STUDENT IN AN ORGANIZED HEALTH CARE EDUCATION/TRAINING PROGRAM

## 2020-03-20 PROCEDURE — 94761 N-INVAS EAR/PLS OXIMETRY MLT: CPT

## 2020-03-20 PROCEDURE — 94640 AIRWAY INHALATION TREATMENT: CPT

## 2020-03-20 PROCEDURE — 85018 HEMOGLOBIN: CPT

## 2020-03-20 PROCEDURE — 94770 HC ETCO2 MONITOR DAILY: CPT

## 2020-03-20 PROCEDURE — 83605 ASSAY OF LACTIC ACID: CPT

## 2020-03-20 PROCEDURE — 2700000000 HC OXYGEN THERAPY PER DAY

## 2020-03-20 PROCEDURE — 84100 ASSAY OF PHOSPHORUS: CPT

## 2020-03-20 PROCEDURE — 85025 COMPLETE CBC W/AUTO DIFF WBC: CPT

## 2020-03-20 PROCEDURE — 94003 VENT MGMT INPAT SUBQ DAY: CPT

## 2020-03-20 PROCEDURE — 83735 ASSAY OF MAGNESIUM: CPT

## 2020-03-20 PROCEDURE — 85014 HEMATOCRIT: CPT

## 2020-03-20 PROCEDURE — 82947 ASSAY GLUCOSE BLOOD QUANT: CPT

## 2020-03-20 PROCEDURE — 99024 POSTOP FOLLOW-UP VISIT: CPT | Performed by: SURGERY

## 2020-03-20 PROCEDURE — 6360000002 HC RX W HCPCS: Performed by: HOSPITALIST

## 2020-03-20 RX ORDER — FLUCONAZOLE 200 MG/1
400 TABLET ORAL DAILY
Status: DISCONTINUED | OUTPATIENT
Start: 2020-03-20 | End: 2020-03-22

## 2020-03-20 RX ORDER — ALBUTEROL SULFATE 2.5 MG/3ML
2.5 SOLUTION RESPIRATORY (INHALATION) 2 TIMES DAILY
Status: DISCONTINUED | OUTPATIENT
Start: 2020-03-21 | End: 2020-03-24

## 2020-03-20 RX ORDER — 0.9 % SODIUM CHLORIDE 0.9 %
500 INTRAVENOUS SOLUTION INTRAVENOUS ONCE
Status: COMPLETED | OUTPATIENT
Start: 2020-03-20 | End: 2020-03-20

## 2020-03-20 RX ORDER — IPRATROPIUM BROMIDE AND ALBUTEROL SULFATE 2.5; .5 MG/3ML; MG/3ML
1 SOLUTION RESPIRATORY (INHALATION) 4 TIMES DAILY
Status: DISCONTINUED | OUTPATIENT
Start: 2020-03-20 | End: 2020-03-24

## 2020-03-20 RX ADMIN — Medication 150 MCG/HR: at 18:17

## 2020-03-20 RX ADMIN — GABAPENTIN 300 MG: 300 CAPSULE ORAL at 20:32

## 2020-03-20 RX ADMIN — SODIUM CHLORIDE 500 ML: 9 INJECTION, SOLUTION INTRAVENOUS at 21:35

## 2020-03-20 RX ADMIN — I.V. FAT EMULSION 100 ML: 20 EMULSION INTRAVENOUS at 17:28

## 2020-03-20 RX ADMIN — Medication 10 ML: at 20:33

## 2020-03-20 RX ADMIN — SUCRALFATE 1 G: 1 TABLET ORAL at 17:00

## 2020-03-20 RX ADMIN — ENOXAPARIN SODIUM 40 MG: 40 INJECTION SUBCUTANEOUS at 08:02

## 2020-03-20 RX ADMIN — METOCLOPRAMIDE 10 MG: 5 INJECTION, SOLUTION INTRAMUSCULAR; INTRAVENOUS at 06:25

## 2020-03-20 RX ADMIN — METOPROLOL TARTRATE 5 MG: 5 INJECTION, SOLUTION INTRAVENOUS at 04:53

## 2020-03-20 RX ADMIN — Medication 10 ML: at 08:20

## 2020-03-20 RX ADMIN — MEROPENEM 1 G: 1 INJECTION, POWDER, FOR SOLUTION INTRAVENOUS at 04:20

## 2020-03-20 RX ADMIN — Medication 4 MG/HR: at 04:30

## 2020-03-20 RX ADMIN — SODIUM CHLORIDE, PRESERVATIVE FREE 10 ML: 5 INJECTION INTRAVENOUS at 20:33

## 2020-03-20 RX ADMIN — BUSPIRONE HYDROCHLORIDE 20 MG: 10 TABLET ORAL at 08:01

## 2020-03-20 RX ADMIN — ACETAMINOPHEN 650 MG: 325 TABLET ORAL at 06:25

## 2020-03-20 RX ADMIN — SUCRALFATE 1 G: 1 TABLET ORAL at 06:25

## 2020-03-20 RX ADMIN — MEROPENEM 1 G: 1 INJECTION, POWDER, FOR SOLUTION INTRAVENOUS at 20:31

## 2020-03-20 RX ADMIN — IPRATROPIUM BROMIDE AND ALBUTEROL SULFATE 1 AMPULE: .5; 3 SOLUTION RESPIRATORY (INHALATION) at 19:46

## 2020-03-20 RX ADMIN — Medication 10 ML: at 08:19

## 2020-03-20 RX ADMIN — CLONAZEPAM 0.5 MG: 0.5 TABLET ORAL at 20:32

## 2020-03-20 RX ADMIN — TRAZODONE HYDROCHLORIDE 100 MG: 100 TABLET ORAL at 20:31

## 2020-03-20 RX ADMIN — FLUCONAZOLE 400 MG: 200 TABLET ORAL at 18:35

## 2020-03-20 RX ADMIN — CLONAZEPAM 0.5 MG: 0.5 TABLET ORAL at 11:57

## 2020-03-20 RX ADMIN — Medication 100 MCG/HR: at 11:55

## 2020-03-20 RX ADMIN — IPRATROPIUM BROMIDE AND ALBUTEROL SULFATE 1 AMPULE: .5; 3 SOLUTION RESPIRATORY (INHALATION) at 11:01

## 2020-03-20 RX ADMIN — ACETAMINOPHEN 650 MG: 325 TABLET ORAL at 20:32

## 2020-03-20 RX ADMIN — BISACODYL 10 MG: 10 SUPPOSITORY RECTAL at 07:18

## 2020-03-20 RX ADMIN — ONDANSETRON 4 MG: 2 INJECTION INTRAMUSCULAR; INTRAVENOUS at 06:25

## 2020-03-20 RX ADMIN — Medication 150 MCG/HR: at 04:31

## 2020-03-20 RX ADMIN — METOCLOPRAMIDE 10 MG: 5 INJECTION, SOLUTION INTRAMUSCULAR; INTRAVENOUS at 17:00

## 2020-03-20 RX ADMIN — IPRATROPIUM BROMIDE AND ALBUTEROL SULFATE 1 AMPULE: .5; 3 SOLUTION RESPIRATORY (INHALATION) at 15:31

## 2020-03-20 RX ADMIN — MEROPENEM 1 G: 1 INJECTION, POWDER, FOR SOLUTION INTRAVENOUS at 12:00

## 2020-03-20 RX ADMIN — METOCLOPRAMIDE 10 MG: 5 INJECTION, SOLUTION INTRAMUSCULAR; INTRAVENOUS at 11:10

## 2020-03-20 RX ADMIN — ESCITALOPRAM OXALATE 20 MG: 10 TABLET ORAL at 08:05

## 2020-03-20 RX ADMIN — Medication 240 ML: at 15:00

## 2020-03-20 RX ADMIN — LORAZEPAM 1 MG: 2 INJECTION INTRAMUSCULAR at 13:30

## 2020-03-20 RX ADMIN — BUSPIRONE HYDROCHLORIDE 20 MG: 10 TABLET ORAL at 13:30

## 2020-03-20 RX ADMIN — GABAPENTIN 300 MG: 300 CAPSULE ORAL at 13:30

## 2020-03-20 RX ADMIN — PANTOPRAZOLE SODIUM 40 MG: 40 INJECTION, POWDER, FOR SOLUTION INTRAVENOUS at 08:03

## 2020-03-20 RX ADMIN — SUCRALFATE 1 G: 1 TABLET ORAL at 11:10

## 2020-03-20 RX ADMIN — Medication 6 MCG/MIN: at 08:51

## 2020-03-20 RX ADMIN — POTASSIUM CHLORIDE: 2 INJECTION, SOLUTION, CONCENTRATE INTRAVENOUS at 17:29

## 2020-03-20 RX ADMIN — GABAPENTIN 300 MG: 300 CAPSULE ORAL at 08:02

## 2020-03-20 RX ADMIN — BUSPIRONE HYDROCHLORIDE 20 MG: 10 TABLET ORAL at 20:31

## 2020-03-20 ASSESSMENT — PULMONARY FUNCTION TESTS
PIF_VALUE: 14
PIF_VALUE: 19
PIF_VALUE: 25
PIF_VALUE: 7
PIF_VALUE: 17
PIF_VALUE: 14
PIF_VALUE: 14
PIF_VALUE: 13
PIF_VALUE: 19
PIF_VALUE: 27
PIF_VALUE: 7
PIF_VALUE: 12
PIF_VALUE: 14
PIF_VALUE: 9
PIF_VALUE: 19
PIF_VALUE: 15
PIF_VALUE: 10
PIF_VALUE: 14
PIF_VALUE: 12
PIF_VALUE: 20
PIF_VALUE: 9
PIF_VALUE: 37
PIF_VALUE: 9
PIF_VALUE: 21
PIF_VALUE: 27
PIF_VALUE: 17
PIF_VALUE: 23
PIF_VALUE: 20
PIF_VALUE: 15
PIF_VALUE: 10
PIF_VALUE: 6
PIF_VALUE: 5
PIF_VALUE: 10
PIF_VALUE: 12
PIF_VALUE: 9
PIF_VALUE: 13
PIF_VALUE: 11
PIF_VALUE: 11
PIF_VALUE: 10
PIF_VALUE: 17
PIF_VALUE: 14
PIF_VALUE: 16
PIF_VALUE: 10
PIF_VALUE: 24
PIF_VALUE: 23
PIF_VALUE: 9
PIF_VALUE: 9
PIF_VALUE: 15
PIF_VALUE: 8
PIF_VALUE: 4
PIF_VALUE: 10
PIF_VALUE: 10
PIF_VALUE: 26
PIF_VALUE: 13
PIF_VALUE: 12
PIF_VALUE: 11
PIF_VALUE: 27
PIF_VALUE: 13
PIF_VALUE: 24
PIF_VALUE: 14
PIF_VALUE: 12
PIF_VALUE: 33
PIF_VALUE: 20
PIF_VALUE: 7
PIF_VALUE: 7
PIF_VALUE: 13
PIF_VALUE: 11
PIF_VALUE: 18
PIF_VALUE: 9
PIF_VALUE: 15
PIF_VALUE: 15
PIF_VALUE: 17

## 2020-03-20 NOTE — PROGRESS NOTES
Occupational Therapy Not Seen Note    DATE: 3/20/2020  Name: Christelle Isabel  : 1945  MRN: [id number]    Patient not available for Occupational Therapy due to:    Sedation (fentanyl & versed) / intubated    Next Scheduled Treatment: 2020    Electronically signed by KYRA Almeida on 3/20/2020 at 1:54 PM

## 2020-03-20 NOTE — CARE COORDINATION
Care Transition  45 [facility] liaison to discuss referral. She is still following. She is unable to take patient patient remains on TPN.

## 2020-03-20 NOTE — PROGRESS NOTES
Nutrition Assessment (Parenteral Nutrition)    Type and Reason for Visit: Reassess    Nutrition Recommendations: Increase K in PN. No other changes recommended. Nutrition Assessment: Emesis this AM. Tube feed held. Vent and PN continue. Malnutrition Assessment:  · Malnutrition Status: At risk for malnutrition  · Context: Acute illness or injury  · Findings of the 6 clinical characteristics of malnutrition (Minimum of 2 out of 6 clinical characteristics is required to make the diagnosis of moderate or severe Protein Calorie Malnutrition based on AND/ASPEN Guidelines):  1. Energy Intake-Greater than 75% of estimated energy requirement, Greater than or equal to 5 days(with start of TPN)    2. Weight Loss-No significant weight loss, (3 weeks)  3. Fat Loss-Mild subcutaneous fat loss, Orbital  4. Muscle Loss-No significant muscle mass loss,    5. Fluid Accumulation-(Mild to moderate fluid accumulation), Extremities, Generalized  6.  Strength-Not measured    Nutrition Risk Level: High    Nutrient Needs:  · Estimated Daily Total Kcal: 8032-8238 kcal/day  · Estimated Daily Protein (g):  g pro/day   Nutrition Diagnosis:   · Problem: Inadequate oral intake  · Etiology: related to Impaired respiratory function-inability to consume food, Alteration in GI function, Nausea, Vomiting     Signs and symptoms:  as evidenced by Nutrition support - PN, NPO status due to medical condition    Objective Information:  · Nutrition-Focused Physical Findings: Last BM 3/15.   · Wound Type: Surgical Wound  · Current Nutrition Therapies:  · Oral Diet Orders: NPO   · Parenteral Nutrition Orders:  · Type and Formula: (300 g Dex, 90 g AA )   · Lipids: 100ml, Daily  · Rate/Volume: 60 mL/hr   · Duration: Continuous  · Goal PN Orders Provides: 1580 kcal, 90 g protein  · Anthropometric Measures:  · Ht: 5' 2\" (157.5 cm)   · Current Body Wt: 213 lb (96.6 kg)  · Admission Body Wt: 197 lb 12 oz (89.7 kg)  · Usual Body Wt: (186 lb on

## 2020-03-20 NOTE — PROGRESS NOTES
[x] Yes    If yes -   [x] Levophed       [] Dopamine     [] Vasopressin       [] Dobutamine  [] Phenylephrine         [] Epinephrine    CENTRAL LINES:     [x] No  RUE PICC line [] Yes   (Date of Insertion:   )           If yes -     [] Right IJ     [] Left IJ [] Right Femoral [] Left Femoral                   [] Right Subclavian [] Left Subclavian       DUDLEY'S CATHETER:   [] No   [x] Yes  (Date of Insertion:   )     URINE OUTPUT:            [x] Good  [] Low              [] Anuric  OBJECTIVE:     VITAL SIGNS:  BP (!) 133/59   Pulse 79   Temp 99.9 °F (37.7 °C) (Core)   Resp 19   Ht 5' 2\" (1.575 m)   Wt 213 lb 10 oz (96.9 kg)   SpO2 100%   BMI 39.07 kg/m²   Tmax over 24 hours:  Temp (24hrs), Av.5 °F (38.1 °C), Min:99.3 °F (37.4 °C), Max:101.8 °F (38.8 °C)      Patient Vitals for the past 8 hrs:   BP Temp Temp src Pulse Resp SpO2 Weight   20 0700 -- -- -- 79 19 100 % --   20 0645 -- -- -- 83 18 100 % --   20 0630 -- -- -- 83 20 100 % --   20 0615 -- -- -- 85 18 100 % --   20 0600 -- -- -- 88 18 100 % 213 lb 10 oz (96.9 kg)   20 0545 -- -- -- 89 25 100 % --   20 0544 -- -- -- 89 21 93 % --   20 0533 -- -- -- 88 28 99 % --   20 0530 -- -- -- 84 22 98 % --   20 0515 -- -- -- 80 26 100 % --   20 0500 -- -- -- 80 20 100 % --   20 0445 -- -- -- 90 20 100 % --   20 0430 -- -- -- 90 19 100 % --   20 0415 -- -- -- 93 20 97 % --   20 0400 (!) 133/59 99.9 °F (37.7 °C) CORE 91 19 100 % --   20 0345 -- -- -- 93 17 100 % --   20 0330 -- -- -- 97 23 94 % --   20 0326 -- -- -- 97 19 93 % --   20 0315 -- -- -- 100 24 92 % --   20 0300 -- -- -- 108 (!) 34 93 % --   20 0245 -- -- -- 99 24 96 % --   20 0230 -- -- -- 105 28 95 % --   20 0215 -- -- -- 95 26 94 % --   20 0200 -- -- -- 88 17 95 % --   20 0145 -- -- -- 88 17 96 % --   20 0130 -- -- -- 85 16 96 % --   20 Information  $Ventilation: $Subsequent Day  Ventilator Started: Yes  Ventilator Stopped: Yes  Skin Assessment: Clean, dry, & intact  Equipment Changed: HME  Vent Type: Servo i  Vent Mode: PRVC  Vt Ordered: 400 mL  Pressure Ordered: 7  Rate Set: 22 bmp  Pressure Support: 8 cmH20  FiO2 : 70 %  Sensitivity: 3  PEEP/CPAP: 8  I Time/ I Time %: 0.75 s  Cuff Pressure (cm H2O): 25 cm H2O  Humidification Source: HME  Humidification Temp: 31  Humidification Temp Measured: 31  Circuit Condensation: Drained  Nitric Oxide/Epoprostenol In Use?: No  Additional Respiratory  Assessments  Pulse: 79  Resp: 19  SpO2: 100 %  End Tidal CO2: 44 (%)  pCO2 (TCOM, mmHg): 36 mmHg  Position: Semi-Shelton's  Humidification Source: HME  Humidification Temp: 31  Circuit Condensation: Drained  Oral Care Completed?: Yes  Oral Care: Teeth brushed, Mouth swabbed, Mouth suctioned  Subglottic Suction Done?: Yes  Cuff Pressure (cm H2O): 25 cm H2O  Skin barrier applied: No    ABGs:   Lab Results   Component Value Date    KCN1HLU 29 03/20/2020    FIO2 50.0 03/20/2020     Lactic Acid:   Lab Results   Component Value Date    LACTA 4.1 04/30/2018         DATA:  Complete Blood Count:   Recent Labs     03/18/20  0416  03/19/20  0404 03/19/20  1107 03/19/20  2235 03/20/20  0511   WBC 13.9*  --  8.9  --   --  9.7   HGB 7.5*   < > 7.3* 8.3* 9.3* 8.1*   .0*  --  102.5  --   --  97.8     --  192  --   --  228   RBC 2.41*  --  2.37*  --   --  2.67*   HCT 25.3*   < > 24.3* 27.8* 29.6* 26.1*   MCH 31.1  --  30.8  --   --  30.3   MCHC 29.6  --  30.0  --   --  31.0   RDW 15.7*  --  15.1*  --   --  14.7*   MPV 11.1  --  11.0  --   --  10.8    < > = values in this interval not displayed.         PT/INR:    Lab Results   Component Value Date    PROTIME 11.0 02/10/2020    INR 1.0 02/10/2020     PTT:  No results found for: APTT, PTT    Basal Metabolic Profile:   Recent Labs     03/18/20  0416 03/19/20  0404 03/20/20  0511    135 134*   K 3.7 3.6* 3.5*   BUN 16 15 14   CREATININE 0.64 0.55 0.46*   * 104 100   CO2 20 21 24      Magnesium:   Lab Results   Component Value Date    MG 1.7 03/20/2020    MG 1.8 03/18/2020    MG 1.7 03/16/2020     Phosphorus:   Lab Results   Component Value Date    PHOS 3.1 03/20/2020    PHOS 3.3 03/18/2020    PHOS 3.8 03/16/2020     S. Calcium:  Recent Labs     03/20/20  0511   CALCIUM 8.0*     S. Ionized Calcium:No results for input(s): IONCA in the last 72 hours. Urinalysis:   Lab Results   Component Value Date    NITRU NEGATIVE 03/17/2020    COLORU YELLOW 03/17/2020    PHUR 5.5 03/17/2020    WBCUA 0 TO 2 03/17/2020    RBCUA 20 TO 50 03/17/2020    MUCUS NOT REPORTED 03/17/2020    TRICHOMONAS NOT REPORTED 03/17/2020    YEAST MANY 03/17/2020    BACTERIA NOT REPORTED 03/17/2020    CLARITYU Clear 06/29/2018    SPECGRAV 1.013 03/17/2020    LEUKOCYTESUR NEGATIVE 03/17/2020    UROBILINOGEN Normal 03/17/2020    BILIRUBINUR NEGATIVE 03/17/2020    BILIRUBINUR Negative 06/29/2018    BLOODU Non-hemolyzed trace 06/29/2018    GLUCOSEU NEGATIVE 03/17/2020    KETUA NEGATIVE 03/17/2020    AMORPHOUS NOT REPORTED 03/17/2020       CARDIAC ENZYMES: No results for input(s): CKMB, CKMBINDEX, TROPONINI in the last 72 hours. Invalid input(s): CKTOTAL;3  BNP: No results for input(s): BNP in the last 72 hours. LFTS  Recent Labs     03/19/20  0404   ALKPHOS 122*   ALT 81*   AST 26   BILITOT 0.18*   LABALBU 1.4*       AMYLASE/LIPASE/AMMONIA  No results for input(s): AMYLASE, LIPASE, AMMONIA in the last 72 hours. Last 3 Blood Glucose:   Recent Labs     03/17/20  1423 03/18/20  0416 03/19/20  0404 03/20/20  0511   GLUCOSE 157* 197* 134* 127*      HgBA1c:  No results found for: LABA1C      TSH:  No results found for: TSH  ANEMIA STUDIES  No results for input(s): LABIRON, TIBC, FERRITIN, [id number], FOLATE, OCCULTBLD in the last 72 hours.     Cultures during this admission:     Blood cultures:                 [] None drawn      [x] Negative (3/17) []  Positive (Details:  )  Urine Culture:                   [] None drawn      [] Negative             [x]  Positive (Details: candida )  Sputum Culture:               [x] None drawn       [] Negative             []  Positive (Details:  )   Endotracheal aspirate:     [] None drawn       [] Negative             [x]  Positive (Details: pseudohyphae, budding yeast cells, rare mixed bacterial )   Respiratory Viral PCR     [] None drawn      [x] Negative (3/17)            []  Positive (Details:  )    Chest Xray (3/20/2020):  1. Moderate pulmonary vascular congestion. Small bilateral pleural effusions and bibasilar airspace disease, left greater than right. Follow-up is recommended to document resolution. ASSESSMENT:     Patient Active Problem List   Diagnosis    Frequency of urination    Severe sepsis (HCC)    Back pain    GERD (gastroesophageal reflux disease)    MVP (mitral valve prolapse)    Depression    Anxiety    Urine retention    Acute kidney injury ([facility] [place] [address]    Esophageal dysphagia    Paraesophageal hernia    History of repair of hiatal hernia    Aspiration pneumonia (HCC)    Respiratory failure (HCC)    Centrilobular emphysema (HCC)    Atelectasis    Pleural effusion    Esophageal dilatation    Upper GI bleeding    Acute on chronic blood loss anemia     PLAN:   Acute hypoxic respiratory failure likely due to aspiration pneumonitis   Intubated 3/17 due to respiratory distress. Currently sedated with Versed 4, fentanyl ggt at 150. On PRVC mode 22/400/8/70. ABG today 7.4/36/89/24  CXR today showed findings compatible with pulmonary edema  and small pleural effusions. CT chest/abd/pelvis 3/17. Negative for PE. Indicated b/l plural effusions. Mildly dilated esophagus w/ air-fluid level. No pneumomediastinum. Hepatic steatosis. R nephrectomy. Currently on meropenem 1 g every 8 hour. Vancomycin discontinued on 3/18.     Acute blood loss anemia from possible upper GI bleed from GE junction ulcer on EGD 3/17  Currently on Protonix 40 twice daily. Carafate 4 times daily  Hemoglobin 8.1 today down from 9.3 yesterday   Monitor H&H every 12 hour. Will transfuse if hemoglobin less than 7     Hx of severe Anxiety and panic attacks  Continue with BuSpar, Lexapro, and trazodone. Ativan PRN. Sepsis likely due to underlying aspiration pneumonitis. Resolving   ID following along  Viral PCR panel negative. Tracheal aspirate positive for pseudohyphae/hyphae. Mix bacteria. Blood cultures negative so far. Urine culture grew Candida, Per ID no need of treatment. Continue meropenem  Leukocytosis resolved, 9.7 today  UOP 2.9 L per 24-hour    S/p hiatal hernia repair and Nissen fundoplication on 5/70/0320  Surgery following along           Diet: TPN, TF on hold   NG to low intermittent suction  DVT - Lovenox 40 mg qd, SCDs        Karla Duenas MD.  Internal Medicine Resident   [facility], [facility]   3/20/2020, 7:36 AM      Attending Physician Statement  I have discussed the care of Umair Hutchinson, including pertinent history and exam findings with the resident. I have reviewed the key elements of all parts of the encounter with the resident. I have seen and examined the patient with the resident. I agree with the assessment and plan and status of the problem list as documented. I am seeing the patient doing well on today, events noted overnight. She was started on 10 mL of tube feeding which she did not tolerate and had vomiting and tube feeding was stopped. Overnight she was requiring more FiO2 and she was on 70% FiO2 early morning her PEEP was 8, her endotracheal secretion had no change, chest x-ray did not show any change in the bilateral infiltrate but showed bilateral interstitial worsening infiltrate which could be edema but otherwise no change. She also had worsening hypotension this morning requiring Levophed highest up to 10 arabella and coming down again.   She had been

## 2020-03-20 NOTE — PROGRESS NOTES
[facility] -Progress Note    Today's Date and Time: 3/20/2020, 8:34 AM    Impression :     · Acute respiratory failure s/p intubation 3-17  · S/p EGD 3-17  · PUD  · GERD  · S/P hiatal hernia repair and nisson fundoplication 2-57-92  · Pleural effusion  · Acute blood loss anemia  · BENEDICT    Recommendations:   · Continue IV meropenem day 4  · Discontinued Vancomycin  · Diflucan 400 mg IV q day x 7 days  · Supportive care  · Monitor clinical progression  · Low probability of COVID, no need to test at this time    Medical Decision Making/Summary/Discussion:3/20/2020     ·   Infection Control Recommendations   · Gay Precautions    Antimicrobial Stewardship Recommendations     · Simplification of therapy  · Targeted therapy    Coordination of Outpatient Care:   · Estimated Length of IV antimicrobials:TBD  · Patient will need Midline Catheter Insertion: TBD  · Patient will need PICC line Insertion:  · Patient will need: Home IV , [place],  SNF,  LTAC:TBD  · Patient will need outpatient wound care:TBD    Chief complaint/reason for consultation:    High grade fever    History of Present Illness:   Enma Gama is a 76y.o.-year-old  female who was initially admitted on 2/29/2020. Patient seen at the request of Dr. Bernadette Mckenzie:    Patient has a history of severe GERD despite being on PPI, H2 blocker and Tums. She underwent laparoscopic robotic hiatal hernia repair and fundoplication last month on 02/24/2020 and postoperatively she developed hypoxia. CT chest done on 02/26/2020 showed bibasilar atelectasis/consolidation and patchy area of infiltrate in the right upper lobe. Patient was discharged on home oxygen on 2-28. She presented back to [facility] ER on 2-29 with worsening shortness of breath, dyspnea as well as an episode of dark red emesis. Her SaO2 was 60 % in the ED. There was concern of aspiration.   CTA chest done was negative for PE, showed bilateral pleural effusions and severe esophageal dilatation, surgery was consulted. Patient was transferred to [facility] for further management. Patient was started on noninvasive ventilation and then intubated and admitted to the ICU. She improved, was extubated on 3-7 to high flow O2. Pt was transferred out of the ICU on 3-13. On 3-14 pts hgb dropped to 6.3 and she developed coffee-ground then bright red emesis, and was again transferred back to medical ICU. General surgery performed endoscopy on 3/17 which showed an ulcer at the GE junction and mild gastritis with no active bleeding. After the procedure she developed acute respiratory distress and a fever of 39.8. She was emergently intubated and started on Meropenem and Vancomycin. ID is consulted for antibiotic management. Patient was continued on meropenem. Vancomycin D/C    CURRENT EXAMINATION: 3/20/2020     Pt seen and examined in the ICU. She is intubated following commands. On Levophed at very small doses     Febrile with T max 100. F  VSS  Chest x-ray shows bilateral infiltrates  On TPN. Patient could not tolerate tube feeds last night    HRR  Abd soft, quiet bowel sounds. Unable to tolerate any tube feeds    Per RN, no acute issues    Labs, X rays reviewed: 3/20/2020    BUN: 16->15->14  Cr: 0.64->0.55    WBC: 10.2--> 13.9-->8.9-> 9.7  Hb: 7.7-->7.3-> 8.2  Plat: 241-->192-> 228    3-17 Viral PCR negative    Cultures:  Urine:  · 3-17: Candida albicans  Blood:  · 3-17: No growth  Sputum :  · Tracheal aspirate 3-17: Pseudohyphae, few budding yeast cells seen. Candida albicans  Wound:  ·     Discussed with patient, RN    I have personally reviewed the past medical history, past surgical history, medications, social history, and family history, and I have updated the database accordingly.   Past Medical History:     Past Medical History:   Diagnosis Date    Anxiety     Arthritis     Back pain     Bronchitis     Caffeine use     8 coffee / day    Carpal tunnel syndrome     Cataracts, bilateral     Constipation     Depression     Diarrhea     GERD (gastroesophageal reflux disease)     Hyperlipidemia     Hypertension     Mumps     MVP (mitral valve prolapse)     Dr. Tasha Donnelly in May 2019    Recurrent UTI     Dr. Enid Mann    Sinusitis     Ulcerative esophagitis     Wellness examination     Dr. Baron Justice seen in Jan 2020       Past Surgical  History:     Past Surgical History:   Procedure Laterality Date    APPENDECTOMY      CARPAL TUNNEL RELEASE      COLONOSCOPY      CYSTOSCOPY      EGD  3/17/2020         EYE SURGERY      patricia IOL    GASTRIC FUNDOPLICATION N/A 2/77/2075    XI LAPAROSCOPIC ROBOTIC HIATAL HERNIA REPAIR, CHERYL FUNDOPLICATION performed by Janet Hernandez MD at [address] [facility] CATH POWER PICC TRIPLE  3/4/2020        [address] [place] [address]  02/24/2020    LAPAROSCOPIC ROBOTIC HIATAL HERNIA REPAIR, CHERYL FUNDOPLICATION     HYSTERECTOMY      Abdominal    JOINT REPLACEMENT Right     right hip    OVARY REMOVAL      RHINOPLASTY      TONSILLECTOMY      TOTAL HIP ARTHROPLASTY      TOTAL NEPHRECTOMY      atrophic from infections, age 13   [facility] TUBAL LIGATION      UPPER GASTROINTESTINAL ENDOSCOPY N/A 3/17/2020    BEDSIDE EGD ESOPHAGOGASTRODUODENOSCOPY (ICU) performed by [facility] MD at [facility]       Medications:      fat emulsion  100 mL Intravenous Daily    enoxaparin  40 mg Subcutaneous Daily    insulin lispro  0-6 Units Subcutaneous Q6H    fentanNYL  50 mcg Intravenous Once    midazolam  2 mg Intravenous Once    sucralfate  1 g Oral 4 times per day    meropenem  1 g Intravenous Q8H    pantoprazole  40 mg Intravenous BID    And    sodium chloride (PF)  10 mL Intravenous BID    sodium chloride  20 mL Intravenous Once    metoclopramide  10 mg Intravenous Q6H    milk and molasses  240 mL Rectal Once    bisacodyl  10 mg Rectal Daily    lidocaine 1 % injection  5 mL Intradermal Once    sodium chloride flush  10 mL Intravenous 2 times per day    traZODone  100 mg Oral Nightly    magnesium citrate  296 mL Oral Once    clonazePAM  0.5 mg Oral BID    [Held by provider] amitriptyline  10 mg Oral TID    [Held by provider] amLODIPine  10 mg Oral Nightly    gabapentin  300 mg Oral TID    sodium chloride flush  10 mL Intravenous 2 times per day    busPIRone  20 mg Oral TID    [Held by provider] metoprolol succinate  25 mg Oral Nightly    nicotine  1 patch Transdermal Daily    sodium chloride (PF)  10 mL Intravenous Daily    escitalopram  20 mg Oral Daily       Social History:     Social History     Socioeconomic History    Marital status:       Spouse name: Not on file    Number of children: 2    Years of education: Not on file    Highest education level: Not on file   Occupational History    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   Social Needs    Financial resource strain: Not on file    Food insecurity     Worry: Not on file     Inability: Not on file   [employer] needs     Medical: Not on file     Non-medical: Not on file   Tobacco Use    Smoking status: Current Every Day Smoker     Packs/day: 1.00     Years: 50.00     Pack years: 50.00     Types: Cigarettes    Smokeless tobacco: Never Used   Substance and Sexual Activity    Alcohol use: No    Drug use: No    Sexual activity: Never   Lifestyle    Physical activity     Days per week: Not on file     Minutes per session: Not on file    Stress: Not on file   Relationships    Social connections     Talks on phone: Not on file     Gets together: Not on file     Attends Christianity service: Not on file     Active member of club or organization: Not on file     Attends meetings of clubs or organizations: Not on file     Relationship status: Not on file    Intimate partner violence     Fear of current or ex partner: Not on file     Emotionally abused: Not on file     Physically abused: Not on file     Forced sexual activity: Not BMP:   Recent Labs     03/18/20  0416 03/19/20  0404 03/20/20  0511    135 134*   K 3.7 3.6* 3.5*   * 104 100   CO2 20 21 24   BUN 16 15 14   CREATININE 0.64 0.55 0.46*   MG 1.8  --  1.7     Hepatic Function Panel:   Recent Labs     03/19/20  0404   PROT 4.9*   LABALBU 1.4*   BILITOT 0.18*   ALKPHOS 122*   ALT 81*   AST 26     No results for input(s): RPR in the last 72 hours. No results for input(s): HIV in the last 72 hours. No results for input(s): BC in the last 72 hours. Lab Results   Component Value Date    MUCUS NOT REPORTED 03/17/2020    RBC 2.67 03/20/2020    TRICHOMONAS NOT REPORTED 03/17/2020    WBC 9.7 03/20/2020    YEAST MANY 03/17/2020    TURBIDITY CLOUDY 03/17/2020     Lab Results   Component Value Date    CREATININE 0.46 03/20/2020    GLUCOSE 127 03/20/2020       Medical Decision Making-Imaging:   CT chest PE: 3/17  CT PA: No gross pulmonary embolism. 2. Bilateral pleural effusion with bibasilar relaxation atelectasis could be   infectious or inflammatory including sequela from aspiration pneumonitis or   sequela from CHF. 3. Breathing motion greatly blurs detail.  Even considering this, there   appears to be some patchy ground-glass or alveolar density lateral within the   mid and upper lungs bilateral, right greater than left which can be seen with   pneumonitis or pulmonary edema. 4. Mildly dilated esophagus with air-fluid level. 5. No pneumomediastinum. 6. Mild mediastinal lymph node enlargement is very likely reactive. 7. CT ABDOMEN AND PELVIS: No CT evidence of an acute intra-abdominal or   intrapelvic process. 8. Hepatic steatosis. 9. Right nephrectomy. Chest x-ray 3/18: Lenka Curia Moderate pulmonary vascular congestion.  Small bilateral pleural effusions   and bibasilar airspace disease, left greater than right.  Follow-up is   recommended to document resolution.      X-ray abdomen 3/17:  Chest: No significant interval change.  Bibasilar airspace opacities and

## 2020-03-20 NOTE — PLAN OF CARE
Problem: Pain:  Goal: Pain level will decrease  Description: Pain level will decrease  Outcome: Ongoing  Goal: Control of acute pain  Description: Control of acute pain  Outcome: Ongoing  Goal: Control of chronic pain  Description: Control of chronic pain  Outcome: Ongoing     Problem: Discharge Planning:  Goal: Participates in care planning  Description: Participates in care planning  Outcome: Ongoing  Goal: Discharged to appropriate level of care  Description: Discharged to appropriate level of care  Outcome: Ongoing     Problem: Anxiety/Stress:  Goal: Level of anxiety will decrease  Description: Level of anxiety will decrease  Outcome: Ongoing     Problem:  Bowel Function - Altered:  Goal: Bowel elimination is within specified parameters  Description: Bowel elimination is within specified parameters  Outcome: Ongoing     Problem: Nutrition Deficit:  Goal: Ability to achieve adequate nutritional intake will improve  Description: Ability to achieve adequate nutritional intake will improve  Outcome: Ongoing     Problem: Pain:  Goal: Recognizes and communicates pain  Description: Recognizes and communicates pain  Outcome: Ongoing  Goal: Control of acute pain  Description: Control of acute pain  Outcome: Ongoing  Goal: Control of chronic pain  Description: Control of chronic pain  Outcome: Ongoing     Problem: Skin Integrity - Impaired:  Goal: Will show no infection signs and symptoms  Description: Will show no infection signs and symptoms  3/20/2020 1618 by Terrence Smith RN  Outcome: Ongoing  3/20/2020 0938 by Kiera Acuna RCP  Outcome: Ongoing  Goal: Absence of new skin breakdown  Description: Absence of new skin breakdown  3/20/2020 1618 by Terrence Smith RN  Outcome: Ongoing  3/20/2020 0938 by Kiera Acuna RCP  Outcome: Ongoing     Problem: Sleep Pattern Disturbance:  Goal: Appears well-rested  Description: Appears well-rested  Outcome: Ongoing     Problem: Risk for Impaired Skin Integrity  Goal: Tissue integrity - skin and mucous membranes  Description: Structural intactness and normal physiological function of skin and  mucous membranes.   Outcome: Ongoing     Problem: Falls - Risk of:  Goal: Will remain free from falls  Description: Will remain free from falls  Outcome: Ongoing  Goal: Absence of physical injury  Description: Absence of physical injury  Outcome: Ongoing     Problem: OXYGENATION/RESPIRATORY FUNCTION  Goal: Patient will maintain patent airway  3/20/2020 1618 by Jerry Pena RN  Outcome: Ongoing  3/20/2020 0938 by Tata Fowler RCP  Outcome: Ongoing  Goal: Patient will achieve/maintain normal respiratory rate/effort  Description: Respiratory rate and effort will be within normal limits for the patient  3/20/2020 1618 by Jerry Pena RN  Outcome: Ongoing  3/20/2020 0938 by Tata Fowler RCP  Outcome: Ongoing     Problem: MECHANICAL VENTILATION  Goal: Patient will maintain patent airway  3/20/2020 1618 by Jerry Pena RN  Outcome: Ongoing  3/20/2020 0938 by Tata Fowler RCP  Outcome: Ongoing  Goal: Oral health is maintained or improved  3/20/2020 1618 by Jerry Pena RN  Outcome: Ongoing  3/20/2020 0938 by Tata Fowler RCP  Outcome: Ongoing  Goal: ET tube will be managed safely  3/20/2020 1618 by Jerry Pena RN  Outcome: Ongoing  3/20/2020 0938 by Tata Fowler RCP  Outcome: Ongoing  Goal: Ability to express needs and understand communication  3/20/2020 1618 by Jerry Pena RN  Outcome: Ongoing  3/20/2020 0938 by Tata Fowler RCP  Outcome: Ongoing  Goal: Mobility/activity is maintained at optimum level for patient  3/20/2020 1618 by Jerry Pena RN  Outcome: Ongoing  3/20/2020 0938 by Tata Fowler RCP  Outcome: Ongoing     Problem: SKIN INTEGRITY  Goal: Skin integrity is maintained or improved  3/20/2020 1618 by Jerry Pena RN  Outcome: Ongoing  3/20/2020 0938 by Tata Fowler RCP  Outcome: Ongoing     Problem: Nutrition  Goal:

## 2020-03-20 NOTE — FLOWSHEET NOTE
0300 -  Tan colored emesis from patient's mouth. Tube feeding stopped, NG connected to LIWS. 300mL returned.

## 2020-03-21 ENCOUNTER — APPOINTMENT (OUTPATIENT)
Dept: GENERAL RADIOLOGY | Age: 75
DRG: 004 | End: 2020-03-21
Attending: INTERNAL MEDICINE
Payer: MEDICARE

## 2020-03-21 LAB
ABSOLUTE EOS #: 0.07 K/UL (ref 0–0.4)
ABSOLUTE IMMATURE GRANULOCYTE: 0.22 K/UL (ref 0–0.3)
ABSOLUTE LYMPH #: 0.37 K/UL (ref 1–4.8)
ABSOLUTE MONO #: 0.22 K/UL (ref 0.1–0.8)
ALLEN TEST: ABNORMAL
ALLEN TEST: ABNORMAL
ANION GAP SERPL CALCULATED.3IONS-SCNC: 9 MMOL/L (ref 9–17)
BASOPHILS # BLD: 0 % (ref 0–2)
BASOPHILS ABSOLUTE: 0 K/UL (ref 0–0.2)
BUN BLDV-MCNC: 14 MG/DL (ref 8–23)
BUN/CREAT BLD: ABNORMAL (ref 9–20)
CALCIUM SERPL-MCNC: 7.8 MG/DL (ref 8.6–10.4)
CHLORIDE BLD-SCNC: 100 MMOL/L (ref 98–107)
CO2: 27 MMOL/L (ref 20–31)
CREAT SERPL-MCNC: 0.43 MG/DL (ref 0.5–0.9)
DIFFERENTIAL TYPE: ABNORMAL
EOSINOPHILS RELATIVE PERCENT: 1 % (ref 1–4)
FIO2: 45
FIO2: 50
GFR AFRICAN AMERICAN: >60 ML/MIN
GFR NON-AFRICAN AMERICAN: >60 ML/MIN
GFR SERPL CREATININE-BSD FRML MDRD: ABNORMAL ML/MIN/{1.73_M2}
GFR SERPL CREATININE-BSD FRML MDRD: ABNORMAL ML/MIN/{1.73_M2}
GLUCOSE BLD-MCNC: 109 MG/DL (ref 65–105)
GLUCOSE BLD-MCNC: 113 MG/DL (ref 65–105)
GLUCOSE BLD-MCNC: 119 MG/DL (ref 65–105)
GLUCOSE BLD-MCNC: 120 MG/DL (ref 65–105)
GLUCOSE BLD-MCNC: 120 MG/DL (ref 70–99)
GLUCOSE BLD-MCNC: 120 MG/DL (ref 74–100)
GLUCOSE BLD-MCNC: 128 MG/DL (ref 65–105)
HCT VFR BLD CALC: 20.5 % (ref 36.3–47.1)
HCT VFR BLD CALC: 24.3 % (ref 36.3–47.1)
HCT VFR BLD CALC: 24.7 % (ref 36.3–47.1)
HEMOGLOBIN: 6.2 G/DL (ref 11.9–15.1)
HEMOGLOBIN: 7.7 G/DL (ref 11.9–15.1)
HEMOGLOBIN: 8.1 G/DL (ref 11.9–15.1)
IMMATURE GRANULOCYTES: 3 %
LIPASE: 29 U/L (ref 13–60)
LYMPHOCYTES # BLD: 5 % (ref 24–44)
MCH RBC QN AUTO: 31 PG (ref 25.2–33.5)
MCHC RBC AUTO-ENTMCNC: 31.7 G/DL (ref 28.4–34.8)
MCV RBC AUTO: 98 FL (ref 82.6–102.9)
MODE: ABNORMAL
MODE: ABNORMAL
MONOCYTES # BLD: 3 % (ref 1–7)
MORPHOLOGY: ABNORMAL
NEGATIVE BASE EXCESS, ART: ABNORMAL (ref 0–2)
NEGATIVE BASE EXCESS, ART: ABNORMAL (ref 0–2)
NRBC AUTOMATED: 0 PER 100 WBC
O2 DEVICE/FLOW/%: ABNORMAL
O2 DEVICE/FLOW/%: ABNORMAL
PATIENT TEMP: 37.4
PATIENT TEMP: 38.3
PDW BLD-RTO: 14.8 % (ref 11.8–14.4)
PLATELET # BLD: 223 K/UL (ref 138–453)
PLATELET ESTIMATE: ABNORMAL
PMV BLD AUTO: 10.7 FL (ref 8.1–13.5)
POC HCO3: 32.9 MMOL/L (ref 21–28)
POC HCO3: 34.1 MMOL/L (ref 21–28)
POC O2 SATURATION: 94 % (ref 94–98)
POC O2 SATURATION: 97 % (ref 94–98)
POC PCO2 TEMP: 54 MM HG
POC PCO2 TEMP: 58 MM HG
POC PCO2: 52.6 MM HG (ref 35–48)
POC PCO2: 55.1 MM HG (ref 35–48)
POC PH TEMP: 7.38
POC PH TEMP: 7.4
POC PH: 7.4 (ref 7.35–7.45)
POC PH: 7.4 (ref 7.35–7.45)
POC PO2 TEMP: 80 MM HG
POC PO2 TEMP: 90 MM HG
POC PO2: 73.2 MM HG (ref 83–108)
POC PO2: 88 MM HG (ref 83–108)
POSITIVE BASE EXCESS, ART: 7 (ref 0–3)
POSITIVE BASE EXCESS, ART: 8 (ref 0–3)
POTASSIUM SERPL-SCNC: 3.6 MMOL/L (ref 3.7–5.3)
RBC # BLD: 2.48 M/UL (ref 3.95–5.11)
RBC # BLD: ABNORMAL 10*6/UL
SAMPLE SITE: ABNORMAL
SAMPLE SITE: ABNORMAL
SEG NEUTROPHILS: 88 % (ref 36–66)
SEGMENTED NEUTROPHILS ABSOLUTE COUNT: 6.52 K/UL (ref 1.8–7.7)
SODIUM BLD-SCNC: 136 MMOL/L (ref 135–144)
TCO2 (CALC), ART: 35 MMOL/L (ref 22–29)
TCO2 (CALC), ART: 36 MMOL/L (ref 22–29)
WBC # BLD: 7.4 K/UL (ref 3.5–11.3)
WBC # BLD: ABNORMAL 10*3/UL

## 2020-03-21 PROCEDURE — 6370000000 HC RX 637 (ALT 250 FOR IP): Performed by: STUDENT IN AN ORGANIZED HEALTH CARE EDUCATION/TRAINING PROGRAM

## 2020-03-21 PROCEDURE — 85018 HEMOGLOBIN: CPT

## 2020-03-21 PROCEDURE — 83690 ASSAY OF LIPASE: CPT

## 2020-03-21 PROCEDURE — 6360000002 HC RX W HCPCS: Performed by: EMERGENCY MEDICINE

## 2020-03-21 PROCEDURE — 6360000002 HC RX W HCPCS: Performed by: STUDENT IN AN ORGANIZED HEALTH CARE EDUCATION/TRAINING PROGRAM

## 2020-03-21 PROCEDURE — 6370000000 HC RX 637 (ALT 250 FOR IP): Performed by: INTERNAL MEDICINE

## 2020-03-21 PROCEDURE — C9113 INJ PANTOPRAZOLE SODIUM, VIA: HCPCS | Performed by: STUDENT IN AN ORGANIZED HEALTH CARE EDUCATION/TRAINING PROGRAM

## 2020-03-21 PROCEDURE — 85025 COMPLETE CBC W/AUTO DIFF WBC: CPT

## 2020-03-21 PROCEDURE — 86920 COMPATIBILITY TEST SPIN: CPT

## 2020-03-21 PROCEDURE — 99233 SBSQ HOSP IP/OBS HIGH 50: CPT | Performed by: INTERNAL MEDICINE

## 2020-03-21 PROCEDURE — 94761 N-INVAS EAR/PLS OXIMETRY MLT: CPT

## 2020-03-21 PROCEDURE — 6360000002 HC RX W HCPCS: Performed by: INTERNAL MEDICINE

## 2020-03-21 PROCEDURE — 2700000000 HC OXYGEN THERAPY PER DAY

## 2020-03-21 PROCEDURE — 82803 BLOOD GASES ANY COMBINATION: CPT

## 2020-03-21 PROCEDURE — 86850 RBC ANTIBODY SCREEN: CPT

## 2020-03-21 PROCEDURE — 80048 BASIC METABOLIC PNL TOTAL CA: CPT

## 2020-03-21 PROCEDURE — 94003 VENT MGMT INPAT SUBQ DAY: CPT

## 2020-03-21 PROCEDURE — 94640 AIRWAY INHALATION TREATMENT: CPT

## 2020-03-21 PROCEDURE — P9016 RBC LEUKOCYTES REDUCED: HCPCS

## 2020-03-21 PROCEDURE — 94770 HC ETCO2 MONITOR DAILY: CPT

## 2020-03-21 PROCEDURE — 2580000003 HC RX 258: Performed by: STUDENT IN AN ORGANIZED HEALTH CARE EDUCATION/TRAINING PROGRAM

## 2020-03-21 PROCEDURE — 99291 CRITICAL CARE FIRST HOUR: CPT | Performed by: INTERNAL MEDICINE

## 2020-03-21 PROCEDURE — 86900 BLOOD TYPING SEROLOGIC ABO: CPT

## 2020-03-21 PROCEDURE — 85014 HEMATOCRIT: CPT

## 2020-03-21 PROCEDURE — 71045 X-RAY EXAM CHEST 1 VIEW: CPT

## 2020-03-21 PROCEDURE — 2500000003 HC RX 250 WO HCPCS: Performed by: STUDENT IN AN ORGANIZED HEALTH CARE EDUCATION/TRAINING PROGRAM

## 2020-03-21 PROCEDURE — 2000000000 HC ICU R&B

## 2020-03-21 PROCEDURE — 86901 BLOOD TYPING SEROLOGIC RH(D): CPT

## 2020-03-21 PROCEDURE — 37799 UNLISTED PX VASCULAR SURGERY: CPT

## 2020-03-21 RX ORDER — GABAPENTIN 250 MG/5ML
300 SOLUTION ORAL EVERY 8 HOURS SCHEDULED
Status: DISCONTINUED | OUTPATIENT
Start: 2020-03-21 | End: 2020-04-06 | Stop reason: HOSPADM

## 2020-03-21 RX ORDER — MAGNESIUM CARB/ALUMINUM HYDROX 105-160MG
45 TABLET,CHEWABLE ORAL DAILY PRN
Status: DISCONTINUED | OUTPATIENT
Start: 2020-03-21 | End: 2020-03-27

## 2020-03-21 RX ORDER — 0.9 % SODIUM CHLORIDE 0.9 %
250 INTRAVENOUS SOLUTION INTRAVENOUS ONCE
Status: COMPLETED | OUTPATIENT
Start: 2020-03-21 | End: 2020-03-22

## 2020-03-21 RX ORDER — MINERAL OIL AND WHITE PETROLATUM 150; 830 MG/G; MG/G
OINTMENT OPHTHALMIC PRN
Status: DISCONTINUED | OUTPATIENT
Start: 2020-03-21 | End: 2020-04-06 | Stop reason: HOSPADM

## 2020-03-21 RX ADMIN — GABAPENTIN 300 MG: 300 CAPSULE ORAL at 14:35

## 2020-03-21 RX ADMIN — Medication 125 MCG/HR: at 01:51

## 2020-03-21 RX ADMIN — ALBUTEROL SULFATE 2.5 MG: 2.5 SOLUTION RESPIRATORY (INHALATION) at 03:35

## 2020-03-21 RX ADMIN — SODIUM CHLORIDE, PRESERVATIVE FREE 10 ML: 5 INJECTION INTRAVENOUS at 09:49

## 2020-03-21 RX ADMIN — BUSPIRONE HYDROCHLORIDE 20 MG: 10 TABLET ORAL at 09:34

## 2020-03-21 RX ADMIN — I.V. FAT EMULSION 100 ML: 20 EMULSION INTRAVENOUS at 19:24

## 2020-03-21 RX ADMIN — METOCLOPRAMIDE 10 MG: 5 INJECTION, SOLUTION INTRAMUSCULAR; INTRAVENOUS at 18:36

## 2020-03-21 RX ADMIN — SUCRALFATE 1 G: 1 TABLET ORAL at 18:36

## 2020-03-21 RX ADMIN — FLUCONAZOLE 400 MG: 200 TABLET ORAL at 09:34

## 2020-03-21 RX ADMIN — PANTOPRAZOLE SODIUM 40 MG: 40 INJECTION, POWDER, FOR SOLUTION INTRAVENOUS at 00:30

## 2020-03-21 RX ADMIN — SUCRALFATE 1 G: 1 TABLET ORAL at 12:58

## 2020-03-21 RX ADMIN — SODIUM CHLORIDE 250 ML: 9 INJECTION, SOLUTION INTRAVENOUS at 17:49

## 2020-03-21 RX ADMIN — Medication 10 ML: at 09:49

## 2020-03-21 RX ADMIN — Medication 10 ML: at 21:51

## 2020-03-21 RX ADMIN — METOCLOPRAMIDE 10 MG: 5 INJECTION, SOLUTION INTRAMUSCULAR; INTRAVENOUS at 00:29

## 2020-03-21 RX ADMIN — ALBUTEROL SULFATE 2.5 MG: 2.5 SOLUTION RESPIRATORY (INHALATION) at 23:20

## 2020-03-21 RX ADMIN — PANTOPRAZOLE SODIUM 40 MG: 40 INJECTION, POWDER, FOR SOLUTION INTRAVENOUS at 09:35

## 2020-03-21 RX ADMIN — ESCITALOPRAM OXALATE 20 MG: 10 TABLET ORAL at 09:34

## 2020-03-21 RX ADMIN — SODIUM CHLORIDE, PRESERVATIVE FREE 10 ML: 5 INJECTION INTRAVENOUS at 21:51

## 2020-03-21 RX ADMIN — SUCRALFATE 1 G: 1 TABLET ORAL at 00:29

## 2020-03-21 RX ADMIN — IPRATROPIUM BROMIDE AND ALBUTEROL SULFATE 1 AMPULE: .5; 3 SOLUTION RESPIRATORY (INHALATION) at 16:08

## 2020-03-21 RX ADMIN — IPRATROPIUM BROMIDE AND ALBUTEROL SULFATE 1 AMPULE: .5; 3 SOLUTION RESPIRATORY (INHALATION) at 11:13

## 2020-03-21 RX ADMIN — Medication 10 ML: at 09:48

## 2020-03-21 RX ADMIN — TRAZODONE HYDROCHLORIDE 100 MG: 100 TABLET ORAL at 21:50

## 2020-03-21 RX ADMIN — BUSPIRONE HYDROCHLORIDE 20 MG: 10 TABLET ORAL at 14:40

## 2020-03-21 RX ADMIN — SUCRALFATE 1 G: 1 TABLET ORAL at 06:28

## 2020-03-21 RX ADMIN — ACETAMINOPHEN 650 MG: 325 TABLET ORAL at 06:38

## 2020-03-21 RX ADMIN — GABAPENTIN 300 MG: 300 CAPSULE ORAL at 09:34

## 2020-03-21 RX ADMIN — MINERAL OIL 45 ML: 1000 SOLUTION ORAL at 22:04

## 2020-03-21 RX ADMIN — METOPROLOL TARTRATE 5 MG: 5 INJECTION, SOLUTION INTRAVENOUS at 13:25

## 2020-03-21 RX ADMIN — Medication 150 MCG/HR: at 21:25

## 2020-03-21 RX ADMIN — Medication 150 MCG/HR: at 16:00

## 2020-03-21 RX ADMIN — MEROPENEM 1 G: 1 INJECTION, POWDER, FOR SOLUTION INTRAVENOUS at 21:45

## 2020-03-21 RX ADMIN — GABAPENTIN 300 MG: 250 SOLUTION ORAL at 21:50

## 2020-03-21 RX ADMIN — ENOXAPARIN SODIUM 40 MG: 40 INJECTION SUBCUTANEOUS at 09:35

## 2020-03-21 RX ADMIN — Medication 4 MG/HR: at 22:27

## 2020-03-21 RX ADMIN — CLONAZEPAM 0.5 MG: 0.5 TABLET ORAL at 21:50

## 2020-03-21 RX ADMIN — ACETAMINOPHEN 650 MG: 650 SUPPOSITORY RECTAL at 15:19

## 2020-03-21 RX ADMIN — IPRATROPIUM BROMIDE AND ALBUTEROL SULFATE 1 AMPULE: .5; 3 SOLUTION RESPIRATORY (INHALATION) at 20:16

## 2020-03-21 RX ADMIN — POTASSIUM CHLORIDE: 2 INJECTION, SOLUTION, CONCENTRATE INTRAVENOUS at 19:23

## 2020-03-21 RX ADMIN — MEROPENEM 1 G: 1 INJECTION, POWDER, FOR SOLUTION INTRAVENOUS at 04:01

## 2020-03-21 RX ADMIN — METOCLOPRAMIDE 10 MG: 5 INJECTION, SOLUTION INTRAMUSCULAR; INTRAVENOUS at 12:58

## 2020-03-21 RX ADMIN — CLONAZEPAM 0.5 MG: 0.5 TABLET ORAL at 09:34

## 2020-03-21 RX ADMIN — Medication 125 MCG/HR: at 08:18

## 2020-03-21 RX ADMIN — METOCLOPRAMIDE 10 MG: 5 INJECTION, SOLUTION INTRAMUSCULAR; INTRAVENOUS at 06:28

## 2020-03-21 RX ADMIN — MEROPENEM 1 G: 1 INJECTION, POWDER, FOR SOLUTION INTRAVENOUS at 14:32

## 2020-03-21 RX ADMIN — IPRATROPIUM BROMIDE AND ALBUTEROL SULFATE 1 AMPULE: .5; 3 SOLUTION RESPIRATORY (INHALATION) at 07:27

## 2020-03-21 RX ADMIN — PANTOPRAZOLE SODIUM 40 MG: 40 INJECTION, POWDER, FOR SOLUTION INTRAVENOUS at 21:50

## 2020-03-21 RX ADMIN — BISACODYL 10 MG: 10 SUPPOSITORY RECTAL at 09:35

## 2020-03-21 RX ADMIN — BUSPIRONE HYDROCHLORIDE 20 MG: 10 TABLET ORAL at 21:50

## 2020-03-21 RX ADMIN — ACETAMINOPHEN 650 MG: 650 SUPPOSITORY RECTAL at 19:37

## 2020-03-21 ASSESSMENT — PULMONARY FUNCTION TESTS
PIF_VALUE: 17
PIF_VALUE: 12
PIF_VALUE: 13
PIF_VALUE: 27
PIF_VALUE: 25
PIF_VALUE: 13
PIF_VALUE: 19
PIF_VALUE: 12

## 2020-03-21 NOTE — PLAN OF CARE
Pressure exceeds 25 cm H20 []  Insp.  Pressure exceeds 30 cm H20   Plateau Pressure []  NA   [x]  Plateau Pressure less than 4  []  Plateau Pressure less than or equal to 5 []  Plateau Pressure greater than or equal to 6 []  Plateau Pressure greater than or equal to 8       LAYO GOLDBERG  7:30 AM

## 2020-03-21 NOTE — PROGRESS NOTES
atelectasis could be   infectious or inflammatory including sequela from aspiration pneumonitis or   sequela from CHF. 3. Breathing motion greatly blurs detail.  Even considering this, there   appears to be some patchy ground-glass or alveolar density lateral within the   mid and upper lungs bilateral, right greater than left which can be seen with   pneumonitis or pulmonary edema. 4. Mildly dilated esophagus with air-fluid level. 5. No pneumomediastinum. 6. Mild mediastinal lymph node enlargement is very likely reactive. 7. CT ABDOMEN AND PELVIS: No CT evidence of an acute intra-abdominal or   intrapelvic process. 8. Hepatic steatosis. 9. Right nephrectomy. Chest x-ray 3/18: Cipriano Ny Moderate pulmonary vascular congestion.  Small bilateral pleural effusions   and bibasilar airspace disease, left greater than right.  Follow-up is   recommended to document resolution. X-ray abdomen 3/17:  Chest: No significant interval change.  Bibasilar airspace opacities and   small left pleural effusion.       2.  Abdomen: Enteric tube with distal tip in the mid stomach.  No evidence of   bowel obstruction.  Large stool burden. Medical Decision [id number]:       Medical Decision Making-Other:     Note:  · Labs, medications, radiologic studies were reviewed with personal review of films  · Moderate Large amounts of data were reviewed  · Discussed with nursing Staff, Discharge planner  · Infection Control and Prevention measures reviewed  · All prior entries were reviewed  · Administer medications as ordered  · Prognosis: Fair  · Discharge planning reviewed  · Follow up as outpatient. Thank you for allowing us to participate in the care of this patient. Please call with questions. Pamela Spangler MD  PGY1 Internal Medicine Resident  [facility], [address]:    I have discussed the case, including pertinent history and exam findings with the residents and students.

## 2020-03-21 NOTE — PLAN OF CARE
Radha Marcus RN  Outcome: Ongoing  Goal: Control of acute pain  Description: Control of acute pain  3/21/2020 0218 by Aroldo Villatoro RN  Outcome: Ongoing  3/20/2020 1618 by Paz Villaseñor RN  Outcome: Ongoing  Goal: Control of chronic pain  Description: Control of chronic pain  3/21/2020 0218 by Aroldo Villatoro RN  Outcome: Ongoing  3/20/2020 1618 by Paz Villaseñor RN  Outcome: Ongoing     Problem: Skin Integrity - Impaired:  Goal: Will show no infection signs and symptoms  Description: Will show no infection signs and symptoms  3/21/2020 0218 by Aroldo Villatoro RN  Outcome: Ongoing  3/20/2020 1618 by Paz Villaseñor RN  Outcome: Ongoing  Goal: Absence of new skin breakdown  Description: Absence of new skin breakdown  3/21/2020 0218 by Aroldo Villatoro RN  Outcome: Ongoing  3/20/2020 1618 by Paz Villaseñor RN  Outcome: Ongoing     Problem: Sleep Pattern Disturbance:  Goal: Appears well-rested  Description: Appears well-rested  3/21/2020 0218 by Aroldo Villatoro RN  Outcome: Ongoing  3/20/2020 1618 by Paz Villaseñor RN  Outcome: Ongoing     Problem: Risk for Impaired Skin Integrity  Goal: Tissue integrity - skin and mucous membranes  Description: Structural intactness and normal physiological function of skin and  mucous membranes.   3/21/2020 0218 by Aroldo Villatoro RN  Outcome: Ongoing  3/20/2020 1618 by Paz Villaseñor RN  Outcome: Ongoing     Problem: Falls - Risk of:  Goal: Will remain free from falls  Description: Will remain free from falls  3/21/2020 0218 by Aroldo Villatoro RN  Outcome: Ongoing  3/20/2020 1618 by Paz Villaseñor RN  Outcome: Ongoing  Goal: Absence of physical injury  Description: Absence of physical injury  3/21/2020 0218 by Aroldo Villatoro RN  Outcome: Ongoing  3/20/2020 1618 by Paz Villaseñor RN  Outcome: Ongoing     Problem: OXYGENATION/RESPIRATORY FUNCTION  Goal: Patient will maintain patent airway  3/21/2020 0218 by Aroldo Villatoro RN  Outcome: Ongoing  3/20/2020 1618 by Paz Villaseñor RN  Outcome: Ongoing  Goal: Patient will achieve/maintain normal respiratory rate/effort  Description: Respiratory rate and effort will be within normal limits for the patient  3/21/2020 0206 by Mariela Abdul RN  Outcome: Ongoing  3/20/2020 1618 by Alejo Conroy RN  Outcome: Ongoing     Problem: MECHANICAL VENTILATION  Goal: Patient will maintain patent airway  3/21/2020 0218 by Mariela Abdul RN  Outcome: Ongoing  3/20/2020 1618 by Alejo Conroy RN  Outcome: Ongoing  Goal: Oral health is maintained or improved  3/21/2020 0218 by Mariela Abdul RN  Outcome: Ongoing  3/20/2020 1618 by Alejo Conroy RN  Outcome: Ongoing  Goal: ET tube will be managed safely  3/21/2020 0218 by Mariela Abdul RN  Outcome: Ongoing  3/20/2020 1618 by Alejo Conroy RN  Outcome: Ongoing  Goal: Ability to express needs and understand communication  3/21/2020 0218 by Mariela Abdul RN  Outcome: Ongoing  3/20/2020 1618 by Alejo Conroy RN  Outcome: Ongoing  Goal: Mobility/activity is maintained at optimum level for patient  3/21/2020 0218 by Mariela Abdul RN  Outcome: Ongoing  3/20/2020 1618 by Alejo Conroy RN  Outcome: Ongoing     Problem: SKIN INTEGRITY  Goal: Skin integrity is maintained or improved  3/21/2020 0218 by Mariela Abdul RN  Outcome: Ongoing  3/20/2020 1618 by Alejo Conroy RN  Outcome: Ongoing     Problem: Nutrition  Goal: Optimal nutrition therapy  3/21/2020 0218 by Mariela Abdul RN  Outcome: Ongoing  3/20/2020 1618 by Alejo Conroy RN  Outcome: Ongoing     Problem: Musculor/Skeletal Functional Status  Goal: Highest potential functional level  3/21/2020 0218 by Mariela Abdul RN  Outcome: Ongoing  3/20/2020 1618 by Alejo Conroy RN  Outcome: Ongoing     Problem: Restraint Use - Nonviolent/Non-Self-Destructive Behavior:  Goal: Absence of restraint indications  Description: Absence of restraint indications  3/21/2020 0218 by Mariela Abdul RN  Outcome: Not Met This Shift  3/20/2020 1618 by Alejo Conroy RN  Outcome:

## 2020-03-21 NOTE — PROGRESS NOTES
Critical Care Team - Daily Progress Note      Date and time: 3/21/2020 10:26 AM  Patient's name:  Umair Hutchinson  Medical Record Number: [id number]  Patient's account/billing number: [de-identified]  Patient's YOB: 1945  Age: 76 y.o. Date of Admission: 2/29/2020 10:37 AM  Length of stay during current admission: 21      Primary Care Physician: Bart Simons MD  ICU Attending Physician: Dr. Lupe Coles Status: Full Code    Reason for ICU admission: No chief complaint on file.         SUBJECTIVE:     OVERNIGHT EVENTS:       Patient is sedated on Versed   following commands  Intubated  Still having fever T-max 100.8    AWAKE & FOLLOWING COMMANDS:  [] No   [x] Yes    CURRENT VENTILATION STATUS:     [x] Ventilator  [] BIPAP  [] Nasal Cannula [] Room Air      IF INTUBATED, ET TUBE MARKING AT LOWER LIP:       cms    SECRETIONS Amount:  [] Small [] Moderate  [] Large  [x] None  Color:     [] White [] Colored  [] Bloody    SEDATION:  RAAS Score:  [] Propofol gtt  [x] Versed gtt  [] Ativan gtt   [] No Sedation    PARALYZED:  [x] No    [] Yes    DIARRHEA:                [x] No                [] Yes  (C. Difficile status: [] positive                                                                                                                       [] negative                                                                                                                     [] pending)    VASOPRESSORS:  [x] No    [] Yes    If yes -   [] Levophed       [] Dopamine     [] Vasopressin       [] Dobutamine  [] Phenylephrine         [] Epinephrine    CENTRAL LINES:     [x] No   [] Yes   (Date of Insertion:   )           If yes -     [] Right IJ     [] Left IJ [] Right Femoral [] Left Femoral                   [] Right Subclavian [] Left Subclavian       DUDLEY'S CATHETER:   [] No   [x] Yes  (Date of Insertion:   )     URINE OUTPUT:            [x] Good   [] Low              [] Anuric      OBJECTIVE:     VITAL SIGNS:  BP (!) sedated on Versed, following commands  Extremities:  peripheral pulses normal, no pedal edema, no clubbing or cyanosis  SKIN: normal coloration and turgor    Any additional physical findings:      MEDICATIONS:  Scheduled Meds:   ipratropium-albuterol  1 ampule Inhalation 4x daily    albuterol  2.5 mg Nebulization BID    fluconazole  400 mg Oral Daily    fat emulsion  100 mL Intravenous Daily    enoxaparin  40 mg Subcutaneous Daily    insulin lispro  0-6 Units Subcutaneous Q6H    fentanNYL  50 mcg Intravenous Once    midazolam  2 mg Intravenous Once    sucralfate  1 g Oral 4 times per day    meropenem  1 g Intravenous Q8H    pantoprazole  40 mg Intravenous BID    And    sodium chloride (PF)  10 mL Intravenous BID    sodium chloride  20 mL Intravenous Once    metoclopramide  10 mg Intravenous Q6H    milk and molasses  240 mL Rectal Once    bisacodyl  10 mg Rectal Daily    lidocaine 1 % injection  5 mL Intradermal Once    sodium chloride flush  10 mL Intravenous 2 times per day    traZODone  100 mg Oral Nightly    magnesium citrate  296 mL Oral Once    clonazePAM  0.5 mg Oral BID    [Held by provider] amitriptyline  10 mg Oral TID    [Held by provider] amLODIPine  10 mg Oral Nightly    gabapentin  300 mg Oral TID    sodium chloride flush  10 mL Intravenous 2 times per day    busPIRone  20 mg Oral TID    [Held by provider] metoprolol succinate  25 mg Oral Nightly    nicotine  1 patch Transdermal Daily    sodium chloride (PF)  10 mL Intravenous Daily    escitalopram  20 mg Oral Daily     Continuous Infusions:   PN-Adult 2-in-1 Central Line (Standard) 65 mL/hr at 03/20/20 1729    fentaNYL 125 mcg/hr (03/21/20 0818)    dextrose      sodium chloride 10 mL/hr at 03/19/20 1400    norepinephrine Stopped (03/21/20 0350)    midazolam 2 mg/hr (03/21/20 0645)     PRN Meds:   acetaminophen, 650 mg, Q4H PRN    Or  acetaminophen, 650 mg, Q6H PRN  albuterol, 2.5 mg, Q6H PRN  glucose, 15 g, PRN  dextrose, 12.5 g, PRN  glucagon (rDNA), 1 mg, PRN  dextrose, 100 mL/hr, PRN  LORazepam, 1 mg, Q6H PRN  dextromethorphan-guaiFENesin, 10 mL, Q4H PRN  sodium chloride flush, 10 mL, PRN  potassium chloride, 40 mEq, PRN    Or  potassium alternative oral replacement, 40 mEq, PRN    Or  potassium chloride, 10 mEq, PRN  magnesium hydroxide, 30 mL, Daily PRN  sodium chloride flush, 10 mL, PRN  polyethylene glycol, 17 g, Daily PRN  glycerin moisturizing mouth spray, 2 spray, PRN  metoprolol, 5 mg, Q6H PRN  ondansetron, 4 mg, Q6H PRN        SUPPORT DEVICES: [x] Ventilator [] BIPAP  [] Nasal Cannula [] Room Air    VENT SETTINGS (Comprehensive) (if applicable):    Vent Information  $Ventilation: $Subsequent Day  Ventilator Started: Yes  Ventilator Stopped: Yes  Skin Assessment: Clean, dry, & intact  Equipment Changed: HME  Vent Type: Servo i  Vent Mode: PRVC  Vt Ordered: 400 mL  Pressure Ordered: 7  Rate Set: 22 bmp  Pressure Support: 8 cmH20  FiO2 : 100 %  Sensitivity: 1  PEEP/CPAP: 8  I Time/ I Time %: 0.8 s  Cuff Pressure (cm H2O): 25 cm H2O  Humidification Source: HME  Humidification Temp: 31  Humidification Temp Measured: 31  Circuit Condensation: Drained  Nitric Oxide/Epoprostenol In Use?: No  Additional Respiratory  Assessments  Pulse: 97  Resp: 21  SpO2: 100 %  End Tidal CO2: 44 (%)  pCO2 (TCOM, mmHg): 36 mmHg  Position: Semi-Shelton's  Humidification Source: HME  Humidification Temp: 31  Circuit Condensation: Drained  Oral Care Completed?: Yes  Oral Care: Mouthwash, Lip moisturizer applied, Mouth moisturizer, Mouth suctioned  Subglottic Suction Done?: Yes  Cuff Pressure (cm H2O): 25 cm H2O  Skin barrier applied: No    ABGs:     Lab Results   Component Value Date    LRJ9OQT 35 03/21/2020    FIO2 50.0 03/21/2020     Lactic Acid:   Lab Results   Component Value Date    LACTA 4.1 04/30/2018         DATA:  Complete Blood Count:   Recent Labs     03/19/20  0404  03/20/20  0511 03/20/20  1116 03/20/20  2320 03/21/20  6600 WBC 8.9  --  9.7  --   --  7.4   HGB 7.3*   < > 8.1* 8.2* 7.4* 7.7*   .5  --  97.8  --   --  98.0     --  228  --   --  223   RBC 2.37*  --  2.67*  --   --  2.48*   HCT 24.3*   < > 26.1* 26.4* 23.1* 24.3*   MCH 30.8  --  30.3  --   --  31.0   MCHC 30.0  --  31.0  --   --  31.7   RDW 15.1*  --  14.7*  --   --  14.8*   MPV 11.0  --  10.8  --   --  10.7    < > = values in this interval not displayed. PT/INR:    Lab Results   Component Value Date    PROTIME 11.0 02/10/2020    INR 1.0 02/10/2020     PTT:  No results found for: APTT, PTT    Basal Metabolic Profile:   Recent Labs     03/19/20  0404 03/20/20  0511 03/21/20  0859    134* 136   K 3.6* 3.5* 3.6*   BUN 15 14 14   CREATININE 0.55 0.46* 0.43*    100 100   CO2 21 24 27      Magnesium:   Lab Results   Component Value Date    MG 1.7 03/20/2020    MG 1.8 03/18/2020    MG 1.7 03/16/2020     Phosphorus:   Lab Results   Component Value Date    PHOS 3.1 03/20/2020    PHOS 3.3 03/18/2020    PHOS 3.8 03/16/2020     S. Calcium:  Recent Labs     03/21/20  0859   CALCIUM 7.8*     S. Ionized Calcium:No results for input(s): IONCA in the last 72 hours. Urinalysis:   Lab Results   Component Value Date    NITRU NEGATIVE 03/17/2020    COLORU YELLOW 03/17/2020    PHUR 5.5 03/17/2020    WBCUA 0 TO 2 03/17/2020    RBCUA 20 TO 50 03/17/2020    MUCUS NOT REPORTED 03/17/2020    TRICHOMONAS NOT REPORTED 03/17/2020    YEAST MANY 03/17/2020    BACTERIA NOT REPORTED 03/17/2020    CLARITYU Clear 06/29/2018    SPECGRAV 1.013 03/17/2020    LEUKOCYTESUR NEGATIVE 03/17/2020    UROBILINOGEN Normal 03/17/2020    BILIRUBINUR NEGATIVE 03/17/2020    BILIRUBINUR Negative 06/29/2018    BLOODU Non-hemolyzed trace 06/29/2018    GLUCOSEU NEGATIVE 03/17/2020    KETUA NEGATIVE 03/17/2020    AMORPHOUS NOT REPORTED 03/17/2020       CARDIAC ENZYMES: No results for input(s): CKMB, CKMBINDEX, TROPONINI in the last 72 hours.     Invalid input(s): CKTOTAL;3  BNP: No results

## 2020-03-21 NOTE — PROGRESS NOTES
Nutrition Assessment (Parenteral Nutrition)    Type and Reason for Visit: Reassess    Nutrition Recommendations:   -Continue with current TPN-increase KCl in next TPN bag-no other changes made  -Monitor labs daily and adjust as needed    Nutrition Assessment: Discussed with RN-continue with TPN at this time. TF remains on hold due to intolerance, possible PEG tube in future. Malnutrition Assessment:  · Malnutrition Status: At risk for malnutrition  · Context: Acute illness or injury  · Findings of the 6 clinical characteristics of malnutrition (Minimum of 2 out of 6 clinical characteristics is required to make the diagnosis of moderate or severe Protein Calorie Malnutrition based on AND/ASPEN Guidelines):  1. Energy Intake-Greater than 75% of estimated energy requirement, Greater than or equal to 5 days(with start of TPN)    2. Weight Loss-No significant weight loss, (3 weeks)  3. Fat Loss-Mild subcutaneous fat loss, Orbital  4. Muscle Loss-No significant muscle mass loss,    5. Fluid Accumulation-(Mild to moderate fluid accumulation), Extremities, Generalized  6.   Strength-Not measured    Nutrition Risk Level: High    Nutrient Needs:  · Estimated Daily Total Kcal: 7549-6976 kcal/day  · Estimated Daily Protein (g):  g pro/day     Nutrition Diagnosis:   · Problem: Inadequate oral intake  · Etiology: related to Impaired respiratory function-inability to consume food, Alteration in GI function, Nausea, Vomiting     Signs and symptoms:  as evidenced by Nutrition support - PN, NPO status due to medical condition    Objective Information:  · Nutrition-Focused Physical Findings: -  · Wound Type: Surgical Wound  · Current Nutrition Therapies:  · Oral Diet Orders: NPO   · Oral Diet intake: NPO  · Oral Nutrition Supplement (ONS) Orders: None  · ONS intake: NPO  · Parenteral Nutrition Orders:  · Type and Formula: 2-in-1 Custom(300gm dextrose, 90gm AA)   · Lipids: 100ml, Daily  · Rate/Volume: 65ml per hr  · Duration: Continuous  · Current PN Order Provides: see below  · Goal PN Orders Provides: 1580 kcal, 90gm protein  · Additional Calories: none   · Anthropometric Measures:  · Ht: 5' 2\" (157.5 cm)   · Current Body Wt: 206 lb 12.7 oz (93.8 kg)  · Admission Body Wt: 197 lb 12 oz (89.7 kg)  · Usual Body Wt: (186 lb on 2/10/20)  · Ideal Body Wt: 110 lb (49.9 kg), % Ideal Body 179% (adm/ideal)   · BMI Classification: BMI 35.0 - 39.9 Obese Class II    Nutrition Interventions:   Continue Parenteral Nutrition  Continued Inpatient Monitoring, Education Not Indicated    Nutrition Evaluation:   · Evaluation: Progressing toward goals   · Goals: meet % of estimated nutrition needs    · Monitoring: Nutrition Progression, PN Intake, PN Tolerance, Wound Healing, Weight, Pertinent Labs, Monitor Bowel Function      Electronically signed by Supa Aponte RD, LD on 3/21/20 at 10:24 AM EDT    Contact Number: [phone]

## 2020-03-22 ENCOUNTER — APPOINTMENT (OUTPATIENT)
Dept: GENERAL RADIOLOGY | Age: 75
DRG: 004 | End: 2020-03-22
Attending: INTERNAL MEDICINE
Payer: MEDICARE

## 2020-03-22 LAB
ABO/RH: NORMAL
ABSOLUTE EOS #: 0.28 K/UL (ref 0–0.4)
ABSOLUTE IMMATURE GRANULOCYTE: 0.07 K/UL (ref 0–0.3)
ABSOLUTE LYMPH #: 1.1 K/UL (ref 1–4.8)
ABSOLUTE MONO #: 0.28 K/UL (ref 0.1–0.8)
ALLEN TEST: ABNORMAL
ANTIBODY SCREEN: NEGATIVE
ARM BAND NUMBER: NORMAL
BASOPHILS # BLD: 0 % (ref 0–2)
BASOPHILS ABSOLUTE: 0 K/UL (ref 0–0.2)
BLD PROD TYP BPU: NORMAL
CROSSMATCH RESULT: NORMAL
DIFFERENTIAL TYPE: ABNORMAL
DISPENSE STATUS BLOOD BANK: NORMAL
EOSINOPHILS RELATIVE PERCENT: 4 % (ref 1–4)
EXPIRATION DATE: NORMAL
FIO2: 45
GLUCOSE BLD-MCNC: 113 MG/DL (ref 65–105)
GLUCOSE BLD-MCNC: 122 MG/DL (ref 65–105)
GLUCOSE BLD-MCNC: 125 MG/DL (ref 65–105)
GLUCOSE BLD-MCNC: 171 MG/DL (ref 65–105)
HCT VFR BLD CALC: 24.7 % (ref 36.3–47.1)
HEMOGLOBIN: 7.6 G/DL (ref 11.9–15.1)
IMMATURE GRANULOCYTES: 1 %
LYMPHOCYTES # BLD: 16 % (ref 24–44)
MCH RBC QN AUTO: 29.6 PG (ref 25.2–33.5)
MCHC RBC AUTO-ENTMCNC: 30.8 G/DL (ref 28.4–34.8)
MCV RBC AUTO: 96.1 FL (ref 82.6–102.9)
MODE: ABNORMAL
MONOCYTES # BLD: 4 % (ref 1–7)
MORPHOLOGY: ABNORMAL
NEGATIVE BASE EXCESS, ART: ABNORMAL (ref 0–2)
NRBC AUTOMATED: 0 PER 100 WBC
O2 DEVICE/FLOW/%: ABNORMAL
PATIENT TEMP: 37.9
PDW BLD-RTO: 15.9 % (ref 11.8–14.4)
PLATELET # BLD: 200 K/UL (ref 138–453)
PLATELET ESTIMATE: ABNORMAL
PMV BLD AUTO: 10.7 FL (ref 8.1–13.5)
POC HCO3: 35.5 MMOL/L (ref 21–28)
POC O2 SATURATION: 97 % (ref 94–98)
POC PCO2 TEMP: 57 MM HG
POC PCO2: 55.1 MM HG (ref 35–48)
POC PH TEMP: 7.4
POC PH: 7.42 (ref 7.35–7.45)
POC PO2 TEMP: 102 MM HG
POC PO2: 96.9 MM HG (ref 83–108)
POSITIVE BASE EXCESS, ART: 10 (ref 0–3)
RBC # BLD: 2.57 M/UL (ref 3.95–5.11)
RBC # BLD: ABNORMAL 10*6/UL
SAMPLE SITE: ABNORMAL
SEG NEUTROPHILS: 75 % (ref 36–66)
SEGMENTED NEUTROPHILS ABSOLUTE COUNT: 5.17 K/UL (ref 1.8–7.7)
TCO2 (CALC), ART: 37 MMOL/L (ref 22–29)
TRANSFUSION STATUS: NORMAL
UNIT DIVISION: 0
UNIT NUMBER: NORMAL
WBC # BLD: 6.9 K/UL (ref 3.5–11.3)
WBC # BLD: ABNORMAL 10*3/UL

## 2020-03-22 PROCEDURE — 2580000003 HC RX 258: Performed by: STUDENT IN AN ORGANIZED HEALTH CARE EDUCATION/TRAINING PROGRAM

## 2020-03-22 PROCEDURE — 6370000000 HC RX 637 (ALT 250 FOR IP): Performed by: STUDENT IN AN ORGANIZED HEALTH CARE EDUCATION/TRAINING PROGRAM

## 2020-03-22 PROCEDURE — C9113 INJ PANTOPRAZOLE SODIUM, VIA: HCPCS | Performed by: STUDENT IN AN ORGANIZED HEALTH CARE EDUCATION/TRAINING PROGRAM

## 2020-03-22 PROCEDURE — 85018 HEMOGLOBIN: CPT

## 2020-03-22 PROCEDURE — 94003 VENT MGMT INPAT SUBQ DAY: CPT

## 2020-03-22 PROCEDURE — 6360000002 HC RX W HCPCS: Performed by: EMERGENCY MEDICINE

## 2020-03-22 PROCEDURE — 6370000000 HC RX 637 (ALT 250 FOR IP): Performed by: INTERNAL MEDICINE

## 2020-03-22 PROCEDURE — 6360000002 HC RX W HCPCS: Performed by: STUDENT IN AN ORGANIZED HEALTH CARE EDUCATION/TRAINING PROGRAM

## 2020-03-22 PROCEDURE — 94761 N-INVAS EAR/PLS OXIMETRY MLT: CPT

## 2020-03-22 PROCEDURE — 71045 X-RAY EXAM CHEST 1 VIEW: CPT

## 2020-03-22 PROCEDURE — 82947 ASSAY GLUCOSE BLOOD QUANT: CPT

## 2020-03-22 PROCEDURE — 94640 AIRWAY INHALATION TREATMENT: CPT

## 2020-03-22 PROCEDURE — 94770 HC ETCO2 MONITOR DAILY: CPT

## 2020-03-22 PROCEDURE — 2700000000 HC OXYGEN THERAPY PER DAY

## 2020-03-22 PROCEDURE — 2000000000 HC ICU R&B

## 2020-03-22 PROCEDURE — 82803 BLOOD GASES ANY COMBINATION: CPT

## 2020-03-22 PROCEDURE — 85025 COMPLETE CBC W/AUTO DIFF WBC: CPT

## 2020-03-22 PROCEDURE — 99233 SBSQ HOSP IP/OBS HIGH 50: CPT | Performed by: INTERNAL MEDICINE

## 2020-03-22 PROCEDURE — 85014 HEMATOCRIT: CPT

## 2020-03-22 PROCEDURE — 2500000003 HC RX 250 WO HCPCS: Performed by: STUDENT IN AN ORGANIZED HEALTH CARE EDUCATION/TRAINING PROGRAM

## 2020-03-22 PROCEDURE — 99291 CRITICAL CARE FIRST HOUR: CPT | Performed by: INTERNAL MEDICINE

## 2020-03-22 PROCEDURE — 6360000002 HC RX W HCPCS: Performed by: INTERNAL MEDICINE

## 2020-03-22 PROCEDURE — 37799 UNLISTED PX VASCULAR SURGERY: CPT

## 2020-03-22 RX ORDER — FLUCONAZOLE 2 MG/ML
400 INJECTION, SOLUTION INTRAVENOUS EVERY 24 HOURS
Status: COMPLETED | OUTPATIENT
Start: 2020-03-23 | End: 2020-03-26

## 2020-03-22 RX ADMIN — GABAPENTIN 300 MG: 250 SOLUTION ORAL at 23:57

## 2020-03-22 RX ADMIN — Medication 150 MCG/HR: at 23:07

## 2020-03-22 RX ADMIN — I.V. FAT EMULSION 100 ML: 20 EMULSION INTRAVENOUS at 18:30

## 2020-03-22 RX ADMIN — FLUCONAZOLE 400 MG: 200 TABLET ORAL at 10:04

## 2020-03-22 RX ADMIN — SUCRALFATE 1 G: 1 TABLET ORAL at 05:35

## 2020-03-22 RX ADMIN — TRAZODONE HYDROCHLORIDE 100 MG: 100 TABLET ORAL at 21:13

## 2020-03-22 RX ADMIN — IPRATROPIUM BROMIDE AND ALBUTEROL SULFATE 1 AMPULE: .5; 3 SOLUTION RESPIRATORY (INHALATION) at 12:22

## 2020-03-22 RX ADMIN — IPRATROPIUM BROMIDE AND ALBUTEROL SULFATE 1 AMPULE: .5; 3 SOLUTION RESPIRATORY (INHALATION) at 19:36

## 2020-03-22 RX ADMIN — METOCLOPRAMIDE 10 MG: 5 INJECTION, SOLUTION INTRAMUSCULAR; INTRAVENOUS at 14:25

## 2020-03-22 RX ADMIN — PANTOPRAZOLE SODIUM 40 MG: 40 INJECTION, POWDER, FOR SOLUTION INTRAVENOUS at 10:05

## 2020-03-22 RX ADMIN — IPRATROPIUM BROMIDE AND ALBUTEROL SULFATE 1 AMPULE: .5; 3 SOLUTION RESPIRATORY (INHALATION) at 15:25

## 2020-03-22 RX ADMIN — PANTOPRAZOLE SODIUM 40 MG: 40 INJECTION, POWDER, FOR SOLUTION INTRAVENOUS at 21:13

## 2020-03-22 RX ADMIN — CLONAZEPAM 0.5 MG: 0.5 TABLET ORAL at 10:04

## 2020-03-22 RX ADMIN — MEROPENEM 1 G: 1 INJECTION, POWDER, FOR SOLUTION INTRAVENOUS at 04:30

## 2020-03-22 RX ADMIN — BUSPIRONE HYDROCHLORIDE 20 MG: 10 TABLET ORAL at 14:25

## 2020-03-22 RX ADMIN — SODIUM CHLORIDE, PRESERVATIVE FREE 10 ML: 5 INJECTION INTRAVENOUS at 10:28

## 2020-03-22 RX ADMIN — Medication 10 ML: at 10:28

## 2020-03-22 RX ADMIN — ALBUTEROL SULFATE 2.5 MG: 2.5 SOLUTION RESPIRATORY (INHALATION) at 03:28

## 2020-03-22 RX ADMIN — ESCITALOPRAM OXALATE 20 MG: 10 TABLET ORAL at 10:04

## 2020-03-22 RX ADMIN — SODIUM CHLORIDE, PRESERVATIVE FREE 10 ML: 5 INJECTION INTRAVENOUS at 21:14

## 2020-03-22 RX ADMIN — INSULIN LISPRO 1 UNITS: 100 INJECTION, SOLUTION INTRAVENOUS; SUBCUTANEOUS at 23:56

## 2020-03-22 RX ADMIN — GABAPENTIN 300 MG: 250 SOLUTION ORAL at 17:00

## 2020-03-22 RX ADMIN — MINERAL OIL AND WHITE PETROLATUM: 150; 830 OINTMENT OPHTHALMIC at 14:26

## 2020-03-22 RX ADMIN — IPRATROPIUM BROMIDE AND ALBUTEROL SULFATE 1 AMPULE: .5; 3 SOLUTION RESPIRATORY (INHALATION) at 07:26

## 2020-03-22 RX ADMIN — SODIUM CHLORIDE, PRESERVATIVE FREE 10 ML: 5 INJECTION INTRAVENOUS at 10:06

## 2020-03-22 RX ADMIN — Medication 10 ML: at 21:14

## 2020-03-22 RX ADMIN — MEROPENEM 1 G: 1 INJECTION, POWDER, FOR SOLUTION INTRAVENOUS at 20:07

## 2020-03-22 RX ADMIN — BUSPIRONE HYDROCHLORIDE 20 MG: 10 TABLET ORAL at 10:04

## 2020-03-22 RX ADMIN — SUCRALFATE 1 G: 1 TABLET ORAL at 14:25

## 2020-03-22 RX ADMIN — SODIUM CHLORIDE: 9 INJECTION, SOLUTION INTRAVENOUS at 06:20

## 2020-03-22 RX ADMIN — MEROPENEM 1 G: 1 INJECTION, POWDER, FOR SOLUTION INTRAVENOUS at 14:26

## 2020-03-22 RX ADMIN — GABAPENTIN 300 MG: 250 SOLUTION ORAL at 05:35

## 2020-03-22 RX ADMIN — ACETAMINOPHEN 650 MG: 650 SUPPOSITORY RECTAL at 09:58

## 2020-03-22 RX ADMIN — SUCRALFATE 1 G: 1 TABLET ORAL at 18:00

## 2020-03-22 RX ADMIN — Medication 150 MCG/HR: at 16:23

## 2020-03-22 RX ADMIN — POTASSIUM CHLORIDE: 2 INJECTION, SOLUTION, CONCENTRATE INTRAVENOUS at 18:31

## 2020-03-22 RX ADMIN — Medication 150 MCG/HR: at 09:44

## 2020-03-22 RX ADMIN — CLONAZEPAM 0.5 MG: 0.5 TABLET ORAL at 21:13

## 2020-03-22 RX ADMIN — ALBUTEROL SULFATE 2.5 MG: 2.5 SOLUTION RESPIRATORY (INHALATION) at 23:10

## 2020-03-22 RX ADMIN — Medication 5 MG/HR: at 17:36

## 2020-03-22 RX ADMIN — Medication 150 MCG/HR: at 04:06

## 2020-03-22 RX ADMIN — METOCLOPRAMIDE 10 MG: 5 INJECTION, SOLUTION INTRAMUSCULAR; INTRAVENOUS at 05:35

## 2020-03-22 RX ADMIN — Medication 10 ML: at 10:06

## 2020-03-22 RX ADMIN — METOCLOPRAMIDE 10 MG: 5 INJECTION, SOLUTION INTRAMUSCULAR; INTRAVENOUS at 18:00

## 2020-03-22 RX ADMIN — SUCRALFATE 1 G: 1 TABLET ORAL at 00:34

## 2020-03-22 RX ADMIN — METOCLOPRAMIDE 10 MG: 5 INJECTION, SOLUTION INTRAMUSCULAR; INTRAVENOUS at 00:34

## 2020-03-22 RX ADMIN — METOCLOPRAMIDE 10 MG: 5 INJECTION, SOLUTION INTRAMUSCULAR; INTRAVENOUS at 23:57

## 2020-03-22 RX ADMIN — BUSPIRONE HYDROCHLORIDE 20 MG: 10 TABLET ORAL at 21:13

## 2020-03-22 RX ADMIN — INSULIN LISPRO 1 UNITS: 100 INJECTION, SOLUTION INTRAVENOUS; SUBCUTANEOUS at 18:10

## 2020-03-22 RX ADMIN — SUCRALFATE 1 G: 1 TABLET ORAL at 23:57

## 2020-03-22 ASSESSMENT — PULMONARY FUNCTION TESTS
PIF_VALUE: 26
PIF_VALUE: 18
PIF_VALUE: 14
PIF_VALUE: 15
PIF_VALUE: 15
PIF_VALUE: 26

## 2020-03-22 NOTE — PROGRESS NOTES
Surgery Progress Note            PATIENT NAME: Zahida Smith     TODAY'S DATE: 3/22/2020, 7:53 AM    SUBJECTIVE:    Pt seen and examined. No clinical change overnight. Remains intubated and sedated. NGT with 800 ml output. Had 2 soap opal enemas and mineral oil enema with no results. Abd benign. Febrile and tachy. OBJECTIVE:   VITALS:  BP (!) 98/39   Pulse 97   Temp 101.7 °F (38.7 °C) (Oral)   Resp 18   Ht 5' 2\" (1.575 m)   Wt 209 lb 10.5 oz (95.1 kg)   SpO2 96%   BMI 38.35 kg/m²      INTAKE/OUTPUT:      Intake/Output Summary (Last 24 hours) at 3/22/2020 0753  Last data filed at 3/22/2020 0600  Gross per 24 hour   Intake 3289 ml   Output 3368 ml   Net -79 ml       PHYSICAL EXAM  Constitutional:  Sedated and intubated  HEENT:      Head: Normocephalic. Atraumatic     Eyes: pupils are equal and reactive. Cardiovascular: Tachycardic, regular rhythm  Pulmonary/Chest: Tachypnic,   Abdominal: Soft. Non distended, non tender to palpation  Musculoskeletal: equal ROM. + Pitting edema  Neurological: No gross motor deficits equally  Skin: Skin is warm, dry and intact. S/p hiatal hernia repair with syeda funduplication on 2/17/53  Pneumonia- AM CXR, Abx per primary  Anxiety  Anemia  GE junction ulcer     Plan:  1. Continue TPN  2. NGT LIWS   3. Carafate 4 times a day  4. Ok to do protonix BID  5. BID soap suds enema, mineral oil enema, add dulcolax as well  6. Discussed with son at bedside yesterday - will tentatively plan for PEG due to needing enteral access. Timing will be based on febrile state and extubation timing. Will plan to perform PEG prior to extubation. May need to consider trach as well if patient is not weaning well.    7. Medical management per primary team.      Electronically signed by Stephany Miles DO  on 3/22/2020 at 7:53 AM

## 2020-03-22 NOTE — PROGRESS NOTES
deformities  Hematologic: No bleeding or bruising. Neurologic: No headache, weakness, numbness, or tingling. Integument: No rash, no ulcers. Psychiatric: No depression. Endocrine: No polyuria, no polydipsia, no polyphagia. Physical Examination :     Patient Vitals for the past 8 hrs:   BP Temp Temp src Pulse Resp SpO2   03/22/20 1330 -- -- -- 93 18 --   03/22/20 1300 (!) 97/44 -- -- 96 23 --   03/22/20 1230 -- -- -- 104 25 --   03/22/20 1228 -- -- -- -- 23 99 %   03/22/20 1222 -- -- -- 102 24 96 %   03/22/20 1200 -- -- -- 111 28 --   03/22/20 1130 -- -- -- 116 24 --   03/22/20 1100 (!) 107/46 -- -- 120 28 --   03/22/20 0900 (!) 96/50 102.2 °F (39 °C) CORE 126 25 --   03/22/20 0830 -- -- -- 121 24 --   03/22/20 0731 -- -- -- -- 18 96 %   03/22/20 0730 -- -- -- 97 20 97 %   03/22/20 0727 -- -- -- 94 18 98 %   03/22/20 0715 -- -- -- 97 19 96 %   03/22/20 0700 (!) 98/39 -- -- 96 20 96 %     General Appearance: Sedated, intubated,   Head:  Normocephalic, no trauma  Eyes: Pupils equal, round, reactive to light   Neck:Supple, without lymphadenopathy. Thyroid normal, No bruits. Pulmonary/Chest: Clear to auscultation. Coarse breath sounds/ crackles appreciated diffusely   Cardiovascular: Regular rate and rhythm without murmurs, rubs, or gallops. Abdomen: Soft, non tender. Bowel sounds very quiet. No organomegaly  All four Extremities:Generalized edema, bilateral LE 2+ edema  Neurologic: Intubated and sedated  Skin: Warm and dry with good turgor. No signs of peripheral arterial or venous insufficiency. No ulcerations. No open wounds.  Abd incisions clean    Medical Decision Making -Laboratory:   I have independently reviewed/ordered the following labs:    CBC with Differential:   Recent Labs     03/21/20  0609  03/21/20  2319 03/22/20  0536   WBC 7.4  --   --  6.9   HGB 7.7*   < > 8.1* 7.6*   HCT 24.3*   < > 24.7* 24.7*     --   --  200   LYMPHOPCT 5*  --   --  16*   MONOPCT 3  --   --  4    < > = values in effusion.       2.  Abdomen: Enteric tube with distal tip in the mid stomach.  No evidence of   bowel obstruction.  Large stool burden. Medical Decision [facility]fls-Vbqhln[facility]:     Results for Maico Bowers (MRN [id number]) as of 3/22/2020 14:57   Ref. Range 3/17/2020 23:28   Chlamydia pneumoniae By PCR Latest Ref Range: Not Detected  Not Detected   Rhino/Enterovirus PCR Latest Ref Range: Not Detected  Not Detected   Specimen Description Unknown . NASOPHARYNGEAL SWAB   Human Metapneumovirus PCR Latest Ref Range: Not Detected  Not Detected   Influenza A H1 PCR Latest Ref Range: Not Detected  NOT REPORTED   Adenovirus PCR Latest Ref Range: Not Detected  Not Detected   B Pertussis by PCR Latest Ref Range: Not Detected  Not Detected   Coronavirus 229E PCR Latest Ref Range: Not Detected  Not Detected   Coronavirus HKU1 PCR Latest Ref Range: Not Detected  Not Detected   Coronavirus NL63 PCR Latest Ref Range: Not Detected  Not Detected   Coronavirus OC Latest Ref Range: Not Detected  Not Detected   Influenza A by PCR Latest Ref Range: Not Detected  Not Detected   Influenza A H1 (2009) PCR Latest Ref Range: Not Detected  NOT REPORTED   Influenza A H3 PCR Latest Ref Range: Not Detected  NOT REPORTED   Influenza B by PCR Latest Ref Range: Not Detected  Not Detected   Parainfluenza 1 PCR Latest Ref Range: Not Detected  Not Detected   Parainfluenza 2 PCR Latest Ref Range: Not Detected  Not Detected   Parainfluenza 3 PCR Latest Ref Range: Not Detected  Not Detected   Parainfluenza 4 PCR Latest Ref Range: Not Detected  Not Detected   Resp Syncytial Virus PCR Latest Ref Range: Not Detected  Not Detected   Mycoplasma pneumo by PCR Latest Ref Range: Not Detected  Not Detected       3/19/2020  7:50 AM - [place], [facility] Incoming Lab Results From [facility]     Specimen Information: Urine        Component Collected Lab   Specimen Description 03/17/2020  2:47  [address]   . URINE    Special Requests 03/17/2020  2:47 PM

## 2020-03-22 NOTE — PLAN OF CARE
Problem: Pain:  Goal: Pain level will decrease  Description: Pain level will decrease  3/22/2020 0246 by Tejal Aguayo RN  Outcome: Ongoing  3/21/2020 1818 by Tre Monroy RN  Outcome: Ongoing  Goal: Control of acute pain  Description: Control of acute pain  3/22/2020 0246 by Tejal Aguayo RN  Outcome: Ongoing  3/21/2020 1818 by Tre Monroy RN  Outcome: Ongoing  Goal: Control of chronic pain  Description: Control of chronic pain  3/22/2020 0246 by Tejal Aguayo RN  Outcome: Ongoing  3/21/2020 1818 by Tre Monroy RN  Outcome: Ongoing     Problem: Discharge Planning:  Goal: Participates in care planning  Description: Participates in care planning  3/22/2020 0246 by Tejal Aguayo RN  Outcome: Ongoing  3/21/2020 1818 by Tre Monroy RN  Outcome: Ongoing  Goal: Discharged to appropriate level of care  Description: Discharged to appropriate level of care  3/22/2020 0246 by Tejal Aguayo RN  Outcome: Ongoing  3/21/2020 1818 by Tre Monroy RN  Outcome: Ongoing     Problem: Anxiety/Stress:  Goal: Level of anxiety will decrease  Description: Level of anxiety will decrease  3/22/2020 0246 by Tejal Aguayo RN  Outcome: Ongoing  3/21/2020 1818 by Tre Monroy RN  Outcome: Ongoing     Problem:  Bowel Function - Altered:  Goal: Bowel elimination is within specified parameters  Description: Bowel elimination is within specified parameters  3/22/2020 0246 by Tejal Aguayo RN  Outcome: Ongoing  3/21/2020 1818 by Tre Monroy RN  Outcome: Ongoing     Problem: Nutrition Deficit:  Goal: Ability to achieve adequate nutritional intake will improve  Description: Ability to achieve adequate nutritional intake will improve  3/22/2020 0246 by Tejal Aguayo RN  Outcome: Ongoing  3/21/2020 1818 by Tre Monroy RN  Outcome: Ongoing     Problem: Pain:  Goal: Recognizes and communicates pain  Description: Recognizes and communicates pain  3/22/2020 0246 by Tejal Aguayo RN  Outcome: Ongoing  3/21/2020

## 2020-03-22 NOTE — PROGRESS NOTES
Nutrition Assessment (Parenteral Nutrition)    Type and Reason for Visit: Reassess    Nutrition Recommendations:   -Continue with current TPN-no changes made to next bag  -Monitor labs/plans and adjust as needed    Nutrition Assessment: Remains intubated-NG output noted and no BM. Continue with TPN at this time with possible PEG in future. No new labs for today. Malnutrition Assessment:  · Malnutrition Status: At risk for malnutrition  · Context: Acute illness or injury  · Findings of the 6 clinical characteristics of malnutrition (Minimum of 2 out of 6 clinical characteristics is required to make the diagnosis of moderate or severe Protein Calorie Malnutrition based on AND/ASPEN Guidelines):  1. Energy Intake-Greater than 75% of estimated energy requirement, Greater than or equal to 5 days(with start of TPN)    2. Weight Loss-No significant weight loss, (3 weeks)  3. Fat Loss-Mild subcutaneous fat loss, Orbital  4. Muscle Loss-No significant muscle mass loss,    5. Fluid Accumulation-(Mild to moderate fluid accumulation), Extremities, Generalized  6.   Strength-Not measured    Nutrition Risk Level: High    Nutrient Needs:  · Estimated Daily Total Kcal: 7203-7441 kcal/day  · Estimated Daily Protein (g):  g pro/day     Nutrition Diagnosis:   · Problem: Inadequate oral intake  · Etiology: related to Impaired respiratory function-inability to consume food, Alteration in GI function, Nausea, Vomiting     Signs and symptoms:  as evidenced by Nutrition support - PN, NPO status due to medical condition    Objective Information:  · Nutrition-Focused Physical Findings: -  · Wound Type: Surgical Wound  · Current Nutrition Therapies:  · Oral Diet Orders: NPO   · Oral Diet intake: NPO  · Oral Nutrition Supplement (ONS) Orders: None  · ONS intake: NPO  · Parenteral Nutrition Orders:  · Type and Formula: 2-in-1 Custom(300gm dextrose, 90gm AA)   · Lipids: 100ml, Daily  · Rate/Volume: 65ml per hr  · Duration: Continuous  · Current PN Order Provides: see below  · Goal PN Orders Provides: 1580 kcal, 90gm protein  · Additional Calories: none   · Anthropometric Measures:  · Ht: 5' 2\" (157.5 cm)   · Current Body Wt: 206 lb 12.7 oz (93.8 kg)  · Admission Body Wt: 197 lb 12 oz (89.7 kg)  · Usual Body Wt: (186 lb on 2/10/20)  · Ideal Body Wt: 110 lb (49.9 kg), % Ideal Body 179% (adm/ideal)   · BMI Classification: BMI 35.0 - 39.9 Obese Class II    Nutrition Interventions:   Continue Parenteral Nutrition  Continued Inpatient Monitoring, Education Not Indicated    Nutrition Evaluation:   · Evaluation: Progressing toward goals   · Goals: meet % of estimated nutrition needs    · Monitoring: Nutrition Progression, PN Intake, PN Tolerance, Wound Healing, I&O, Weight, Pertinent Labs, Monitor Bowel Function      Electronically signed by Scott Live RD, LD on 3/22/20 at 11:30 AM EDT    Contact Number: [phone]

## 2020-03-22 NOTE — PROGRESS NOTES
Critical Care Team - Daily Progress Note    Date and time: 3/22/2020 7:36 AM  Patient's name:  Zahida Smith  Medical Record Number: [id number]  Patient's account/billing number: [de-identified]  Patient's YOB: 1945  Age: 76 y.o. Date of Admission: 2/29/2020 10:37 AM  Length of stay during current admission: 22  Primary Care Physician: Toi Sin MD  ICU Attending Physician: Dr. Von Simpson Status: Full Code     Reason for ICU admission: aspiration pneumonitis     SUBJECTIVE:   OVERNIGHT EVENTS:   No issues overnight noted. Patient is still intubated on PRVC mode 18/400/8/40 5%. ABG from this morning 7.4/55/96/35. She is sedated with Versed 5 and fentanyl 150 continues. She has been off of Levophed since yesterday. This morning blood pressure 98/39 with a map 53. She follows commands. Urine output 2.5 L last 24 hours. Febrile, tachycardic. NG with 800 mL output in last 24 hours. Overnight she was desatted multiple times with saturation in 80s. She was suctioned with bright red secretion came out.     AWAKE & FOLLOWING COMMANDS:  [] No   [x] Yes    CURRENT VENTILATION STATUS:     [x] Ventilator  [] BIPAP  [] Nasal Cannula [] Room Air      SECRETIONS Amount:  [] Small [] Moderate  [] Large  [x] None  Color:     [] White [] Colored  [] Bloody    SEDATION:  RAAS Score:  [] Propofol gtt  [x] Versed gtt  [] Ativan gtt   [] No Sedation    PARALYZED:  [x] No    [] Yes    DIARRHEA:                [x] No                [] Yes  (C. Difficile status: [] positive                                                                                                                       [] negative                                                                                                                     [] pending)    VASOPRESSORS:  [x] No    [] Yes    If yes -   [] Levophed       [] Dopamine     [] Vasopressin       [] Dobutamine  [] Phenylephrine         [] Epinephrine    CENTRAL LINES:     [] No [x] Yes   (Date of Insertion:   )           If yes -     [] Right IJ     [] Left IJ [] Right Femoral [] Left Femoral                   [x] Right Subclavian [] Left Subclavian     DUDLEY'S CATHETER:   [] No   [x] Yes  (Date of Insertion:   )     URINE OUTPUT:            [x] Good   [] Low              [] Anuric    OBJECTIVE:   VITAL SIGNS:  BP (!) 156/61   Pulse 94   Temp 101.7 °F (38.7 °C) (Oral)   Resp 18   Ht 5' 2\" (1.575 m)   Wt 209 lb 10.5 oz (95.1 kg)   SpO2 96%   BMI 38.35 kg/m²   Tmax over 24 hours:  Temp (24hrs), Av.2 °F (38.4 °C), Min:100.4 °F (38 °C), Max:101.7 °F (38.7 °C)      Patient Vitals for the past 8 hrs:   Pulse Resp SpO2 Weight   20 0731 -- 18 96 % --   20 0727 94 18 98 % --   20 0607 -- -- -- 209 lb 10.5 oz (95.1 kg)   20 0334 89 19 100 % --         Intake/Output Summary (Last 24 hours) at 3/22/2020 0736  Last data filed at 3/22/2020 0600  Gross per 24 hour   Intake 3289 ml   Output 2568 ml   Net 721 ml     Date 20 0000 - 20 2359   Shift 1274-6206 2754-2556 7715-9482 24 Hour Total   INTAKE   I.V.(mL/kg) 305(3.2)   305(3.2)   TPN(mL/kg) 587(6.2)   587(6.2)   Shift Total(mL/kg) 892(9.4)   892(9.4)   OUTPUT   Urine(mL/kg/hr) 920   920   Shift Total(mL/kg) 920(9.7)   920(9.7)   Weight (kg) 95.1 95.1 95.1 95.1     Wt Readings from Last 3 Encounters:   20 209 lb 10.5 oz (95.1 kg)   20 186 lb 1.1 oz (84.4 kg)   20 183 lb 6.4 oz (83.2 kg)     Body mass index is 38.35 kg/m². PHYSICAL EXAM:  Constitutional: Intubated, sedated on Versed, following commands  EENT: PERRLA, EOMI, sclera clear, anicteric, oropharynx clear, no lesions, neck supple with midline trachea. Neck: Supple, symmetrical, trachea midline, no adenopathy, thyroid symmetric, no jvd skin normal  Respiratory: clear to auscultation, no wheezes or rales and unlabored breathing.  No intercostal tenderness  Cardiovascular: regular rate and rhythm, normal S1, S2, no murmur (03/22/20 7208)     PRN Meds:   artificial tears, , PRN  mineral oil, 45 mL, Daily PRN  acetaminophen, 650 mg, Q4H PRN    Or  acetaminophen, 650 mg, Q6H PRN  albuterol, 2.5 mg, Q6H PRN  glucose, 15 g, PRN  dextrose, 12.5 g, PRN  glucagon (rDNA), 1 mg, PRN  dextrose, 100 mL/hr, PRN  LORazepam, 1 mg, Q6H PRN  dextromethorphan-guaiFENesin, 10 mL, Q4H PRN  sodium chloride flush, 10 mL, PRN  potassium chloride, 40 mEq, PRN    Or  potassium alternative oral replacement, 40 mEq, PRN    Or  potassium chloride, 10 mEq, PRN  magnesium hydroxide, 30 mL, Daily PRN  sodium chloride flush, 10 mL, PRN  polyethylene glycol, 17 g, Daily PRN  glycerin moisturizing mouth spray, 2 spray, PRN  metoprolol, 5 mg, Q6H PRN  ondansetron, 4 mg, Q6H PRN        SUPPORT DEVICES: [x] Ventilator [] BIPAP  [] Nasal Cannula [] Room Air    VENT SETTINGS (Comprehensive) (if applicable):  Vent Information  $Ventilation: $Subsequent Day  Ventilator Started: Yes  Ventilator Stopped: Yes  Skin Assessment: Clean, dry, & intact  Equipment Changed: HME  Vent Type: Servo i  Vent Mode: PRVC  Vt Ordered: 400 mL  Pressure Ordered: 7  Rate Set: 16 bmp  Pressure Support: 8 cmH20  FiO2 : 45 %  Sensitivity: 1  PEEP/CPAP: 8  I Time/ I Time %: 0.75 s  Cuff Pressure (cm H2O): 25 cm H2O  Humidification Source: HME  Humidification Temp: 31  Humidification Temp Measured: 31  Circuit Condensation: Drained  Nitric Oxide/Epoprostenol In Use?: No  Additional Respiratory  Assessments  Pulse: 94  Resp: 18  SpO2: 96 %  End Tidal CO2: 45 (%)  pCO2 (TCOM, mmHg): 36 mmHg  Position: Semi-Shelton's  Humidification Source: E  Humidification Temp: 31  Circuit Condensation: Drained  Oral Care Completed?: Yes  Oral Care: Teeth brushed, Mouth swabbed, Mouth moisturizer, Mouth suctioned  Subglottic Suction Done?: Yes  Cuff Pressure (cm H2O): 25 cm H2O  Skin barrier applied: No    ABGs:   Lab Results   Component Value Date    MVM1TNF 37 03/22/2020    FIO2 45.0 03/22/2020     Lactic Acid: Lab Results   Component Value Date    LACTA 4.1 04/30/2018     DATA:  Complete Blood Count:   Recent Labs     03/20/20  0511  03/21/20  0609 03/21/20  1455 03/21/20  2319 03/22/20  0536   WBC 9.7  --  7.4  --   --  6.9   HGB 8.1*   < > 7.7* 6.2* 8.1* 7.6*   MCV 97.8  --  98.0  --   --  96.1     --  223  --   --  200   RBC 2.67*  --  2.48*  --   --  2.57*   HCT 26.1*   < > 24.3* 20.5* 24.7* 24.7*   MCH 30.3  --  31.0  --   --  29.6   MCHC 31.0  --  31.7  --   --  30.8   RDW 14.7*  --  14.8*  --   --  15.9*   MPV 10.8  --  10.7  --   --  10.7    < > = values in this interval not displayed. PT/INR:    Lab Results   Component Value Date    PROTIME 11.0 02/10/2020    INR 1.0 02/10/2020     PTT:  No results found for: APTT, PTT    Basal Metabolic Profile:   Recent Labs     03/20/20  0511 03/21/20  0859   * 136   K 3.5* 3.6*   BUN 14 14   CREATININE 0.46* 0.43*    100   CO2 24 27      Magnesium:   Lab Results   Component Value Date    MG 1.7 03/20/2020    MG 1.8 03/18/2020    MG 1.7 03/16/2020     Phosphorus:   Lab Results   Component Value Date    PHOS 3.1 03/20/2020    PHOS 3.3 03/18/2020    PHOS 3.8 03/16/2020     S. Calcium:  Recent Labs     03/21/20  0859   CALCIUM 7.8*     S. Ionized Calcium:No results for input(s): IONCA in the last 72 hours.       Urinalysis:   Lab Results   Component Value Date    NITRU NEGATIVE 03/17/2020    COLORU YELLOW 03/17/2020    PHUR 5.5 03/17/2020    WBCUA 0 TO 2 03/17/2020    RBCUA 20 TO 50 03/17/2020    MUCUS NOT REPORTED 03/17/2020    TRICHOMONAS NOT REPORTED 03/17/2020    YEAST MANY 03/17/2020    BACTERIA NOT REPORTED 03/17/2020    CLARITYU Clear 06/29/2018    SPECGRAV 1.013 03/17/2020    LEUKOCYTESUR NEGATIVE 03/17/2020    UROBILINOGEN Normal 03/17/2020    BILIRUBINUR NEGATIVE 03/17/2020    BILIRUBINUR Negative 06/29/2018    BLOODU Non-hemolyzed trace 06/29/2018    GLUCOSEU NEGATIVE 03/17/2020    KETUA NEGATIVE 03/17/2020    AMORPHOUS NOT REPORTED 03/17/2020

## 2020-03-22 NOTE — PLAN OF CARE
Hernando Garcia RCP  Outcome: Ongoing  3/22/2020 7798 by Samantha Ruff RCP  Outcome: Ongoing  Goal: Oral health is maintained or improved  3/22/2020 1949 by Fatmata Garay RN  Outcome: Ongoing  3/22/2020 1940 by Hernando Garcia RCP  Outcome: Ongoing  3/22/2020 0814 by Samantha Ruff RCP  Outcome: Ongoing  Goal: ET tube will be managed safely  3/22/2020 1949 by Fatmata Garay RN  Outcome: Ongoing  3/22/2020 1940 by Hernando Garcia RCP  Outcome: Ongoing  3/22/2020 0814 by Samantha Ruff RCP  Outcome: Ongoing  Goal: Ability to express needs and understand communication  3/22/2020 1949 by Fatmata Garay RN  Outcome: Ongoing  3/22/2020 1940 by Hernando Garcia RCP  Outcome: Ongoing  3/22/2020 0814 by Samantha Ruff RCP  Outcome: Ongoing  Goal: Mobility/activity is maintained at optimum level for patient  3/22/2020 1949 by Fatmata Garay RN  Outcome: Ongoing  3/22/2020 1940 by Hernando Garcia RCP  Outcome: Ongoing  3/22/2020 0814 by Samantha Ruff RCP  Outcome: Ongoing     Problem: SKIN INTEGRITY  Goal: Skin integrity is maintained or improved  3/22/2020 1949 by Fatmata Garay RN  Outcome: Ongoing  3/22/2020 1940 by Hernando Garcia RCANGI  Outcome: Ongoing  3/22/2020 0814 by Samantha Ruff RCP  Outcome: Ongoing     Problem: Nutrition  Goal: Optimal nutrition therapy  Description: Nutrition Problem: Inadequate oral intake  Intervention: Food and/or Nutrient Delivery: Continue Parenteral Nutrition  Nutritional Goals: meet % of estimated nutrition needs    Outcome: Ongoing     Problem: Musculor/Skeletal Functional Status  Goal: Highest potential functional level  Outcome: Ongoing     Problem: Restraint Use - Nonviolent/Non-Self-Destructive Behavior:  Goal: Absence of restraint indications  Description: Absence of restraint indications  Outcome: Ongoing  Goal: Absence of restraint-related injury  Description: Absence of restraint-related injury  Outcome: Ongoing

## 2020-03-22 NOTE — PLAN OF CARE
Problem: Restraint Use - Nonviolent/Non-Self-Destructive Behavior:  Goal: Absence of restraint indications  Description: Absence of restraint indications  3/22/2020 0246 by Tejal Aguayo RN  Outcome: Not Met This Shift  3/22/2020 0246 by Tejal Aguayo RN  Outcome: Not Met This Shift  3/21/2020 1818 by Tre Monroy RN  Outcome: Ongoing  Goal: Absence of restraint-related injury  Description: Absence of restraint-related injury  3/22/2020 0246 by Tejal Aguayo RN  Outcome: Not Met This Shift  3/22/2020 0246 by Tejal Aguayo RN  Outcome: Not Met This Shift  3/21/2020 1818 by Tre Monroy RN  Outcome: Ongoing

## 2020-03-23 ENCOUNTER — APPOINTMENT (OUTPATIENT)
Dept: GENERAL RADIOLOGY | Age: 75
DRG: 004 | End: 2020-03-23
Attending: INTERNAL MEDICINE
Payer: MEDICARE

## 2020-03-23 LAB
ABSOLUTE EOS #: 0.16 K/UL (ref 0–0.44)
ABSOLUTE IMMATURE GRANULOCYTE: 0.26 K/UL (ref 0–0.3)
ABSOLUTE LYMPH #: 0.95 K/UL (ref 1.1–3.7)
ABSOLUTE MONO #: 0.25 K/UL (ref 0.1–1.2)
ALLEN TEST: ABNORMAL
ANION GAP SERPL CALCULATED.3IONS-SCNC: 10 MMOL/L (ref 9–17)
BASOPHILS # BLD: 0 % (ref 0–2)
BASOPHILS ABSOLUTE: <0.03 K/UL (ref 0–0.2)
BUN BLDV-MCNC: 15 MG/DL (ref 8–23)
BUN/CREAT BLD: ABNORMAL (ref 9–20)
CALCIUM SERPL-MCNC: 8.6 MG/DL (ref 8.6–10.4)
CHLORIDE BLD-SCNC: 105 MMOL/L (ref 98–107)
CO2: 32 MMOL/L (ref 20–31)
CREAT SERPL-MCNC: 0.38 MG/DL (ref 0.5–0.9)
CULTURE: NORMAL
CULTURE: NORMAL
DIFFERENTIAL TYPE: ABNORMAL
EOSINOPHILS RELATIVE PERCENT: 2 % (ref 1–4)
FIO2: 45
GFR AFRICAN AMERICAN: >60 ML/MIN
GFR NON-AFRICAN AMERICAN: >60 ML/MIN
GFR SERPL CREATININE-BSD FRML MDRD: ABNORMAL ML/MIN/{1.73_M2}
GFR SERPL CREATININE-BSD FRML MDRD: ABNORMAL ML/MIN/{1.73_M2}
GLUCOSE BLD-MCNC: 120 MG/DL (ref 65–105)
GLUCOSE BLD-MCNC: 139 MG/DL (ref 70–99)
GLUCOSE BLD-MCNC: 146 MG/DL (ref 65–105)
GLUCOSE BLD-MCNC: 149 MG/DL (ref 74–100)
GLUCOSE BLD-MCNC: 155 MG/DL (ref 65–105)
HCT VFR BLD CALC: 25.3 % (ref 36.3–47.1)
HCT VFR BLD CALC: 25.8 % (ref 36.3–47.1)
HCT VFR BLD CALC: 29.4 % (ref 36.3–47.1)
HEMOGLOBIN: 7.7 G/DL (ref 11.9–15.1)
HEMOGLOBIN: 7.9 G/DL (ref 11.9–15.1)
HEMOGLOBIN: 9 G/DL (ref 11.9–15.1)
IMMATURE GRANULOCYTES: 4 %
LYMPHOCYTES # BLD: 14 % (ref 24–43)
Lab: NORMAL
Lab: NORMAL
MAGNESIUM: 1.9 MG/DL (ref 1.6–2.6)
MCH RBC QN AUTO: 29.7 PG (ref 25.2–33.5)
MCHC RBC AUTO-ENTMCNC: 30.4 G/DL (ref 28.4–34.8)
MCV RBC AUTO: 97.7 FL (ref 82.6–102.9)
MODE: ABNORMAL
MONOCYTES # BLD: 4 % (ref 3–12)
NEGATIVE BASE EXCESS, ART: ABNORMAL (ref 0–2)
NRBC AUTOMATED: 0 PER 100 WBC
O2 DEVICE/FLOW/%: ABNORMAL
PATIENT TEMP: ABNORMAL
PDW BLD-RTO: 15.6 % (ref 11.8–14.4)
PHOSPHORUS: 2.6 MG/DL (ref 2.6–4.5)
PLATELET # BLD: 173 K/UL (ref 138–453)
PLATELET ESTIMATE: ABNORMAL
PMV BLD AUTO: 10.5 FL (ref 8.1–13.5)
POC HCO3: 37.8 MMOL/L (ref 21–28)
POC LACTIC ACID: 1.2 MMOL/L (ref 0.56–1.39)
POC O2 SATURATION: 99 % (ref 94–98)
POC PCO2 TEMP: ABNORMAL MM HG
POC PCO2: 69.5 MM HG (ref 35–48)
POC PH TEMP: ABNORMAL
POC PH: 7.34 (ref 7.35–7.45)
POC PO2 TEMP: ABNORMAL MM HG
POC PO2: 142.5 MM HG (ref 83–108)
POSITIVE BASE EXCESS, ART: 10 (ref 0–3)
POTASSIUM SERPL-SCNC: 4.1 MMOL/L (ref 3.7–5.3)
RBC # BLD: 2.59 M/UL (ref 3.95–5.11)
RBC # BLD: ABNORMAL 10*6/UL
SAMPLE SITE: ABNORMAL
SEG NEUTROPHILS: 77 % (ref 36–65)
SEGMENTED NEUTROPHILS ABSOLUTE COUNT: 5.34 K/UL (ref 1.5–8.1)
SODIUM BLD-SCNC: 147 MMOL/L (ref 135–144)
SPECIMEN DESCRIPTION: NORMAL
SPECIMEN DESCRIPTION: NORMAL
TCO2 (CALC), ART: 40 MMOL/L (ref 22–29)
WBC # BLD: 7 K/UL (ref 3.5–11.3)
WBC # BLD: ABNORMAL 10*3/UL

## 2020-03-23 PROCEDURE — 2500000003 HC RX 250 WO HCPCS: Performed by: STUDENT IN AN ORGANIZED HEALTH CARE EDUCATION/TRAINING PROGRAM

## 2020-03-23 PROCEDURE — 2580000003 HC RX 258: Performed by: STUDENT IN AN ORGANIZED HEALTH CARE EDUCATION/TRAINING PROGRAM

## 2020-03-23 PROCEDURE — 94770 HC ETCO2 MONITOR DAILY: CPT

## 2020-03-23 PROCEDURE — 6370000000 HC RX 637 (ALT 250 FOR IP): Performed by: STUDENT IN AN ORGANIZED HEALTH CARE EDUCATION/TRAINING PROGRAM

## 2020-03-23 PROCEDURE — 6360000002 HC RX W HCPCS: Performed by: STUDENT IN AN ORGANIZED HEALTH CARE EDUCATION/TRAINING PROGRAM

## 2020-03-23 PROCEDURE — 94761 N-INVAS EAR/PLS OXIMETRY MLT: CPT

## 2020-03-23 PROCEDURE — 2000000000 HC ICU R&B

## 2020-03-23 PROCEDURE — 82947 ASSAY GLUCOSE BLOOD QUANT: CPT

## 2020-03-23 PROCEDURE — 94640 AIRWAY INHALATION TREATMENT: CPT

## 2020-03-23 PROCEDURE — 82803 BLOOD GASES ANY COMBINATION: CPT

## 2020-03-23 PROCEDURE — 99233 SBSQ HOSP IP/OBS HIGH 50: CPT | Performed by: INTERNAL MEDICINE

## 2020-03-23 PROCEDURE — 85025 COMPLETE CBC W/AUTO DIFF WBC: CPT

## 2020-03-23 PROCEDURE — C9113 INJ PANTOPRAZOLE SODIUM, VIA: HCPCS | Performed by: STUDENT IN AN ORGANIZED HEALTH CARE EDUCATION/TRAINING PROGRAM

## 2020-03-23 PROCEDURE — 6360000002 HC RX W HCPCS: Performed by: EMERGENCY MEDICINE

## 2020-03-23 PROCEDURE — 99222 1ST HOSP IP/OBS MODERATE 55: CPT | Performed by: FAMILY MEDICINE

## 2020-03-23 PROCEDURE — 6370000000 HC RX 637 (ALT 250 FOR IP): Performed by: INTERNAL MEDICINE

## 2020-03-23 PROCEDURE — 71045 X-RAY EXAM CHEST 1 VIEW: CPT

## 2020-03-23 PROCEDURE — 80048 BASIC METABOLIC PNL TOTAL CA: CPT

## 2020-03-23 PROCEDURE — 94664 DEMO&/EVAL PT USE INHALER: CPT

## 2020-03-23 PROCEDURE — 83605 ASSAY OF LACTIC ACID: CPT

## 2020-03-23 PROCEDURE — 85014 HEMATOCRIT: CPT

## 2020-03-23 PROCEDURE — 37799 UNLISTED PX VASCULAR SURGERY: CPT

## 2020-03-23 PROCEDURE — 84100 ASSAY OF PHOSPHORUS: CPT

## 2020-03-23 PROCEDURE — 85018 HEMOGLOBIN: CPT

## 2020-03-23 PROCEDURE — 99291 CRITICAL CARE FIRST HOUR: CPT | Performed by: INTERNAL MEDICINE

## 2020-03-23 PROCEDURE — 83735 ASSAY OF MAGNESIUM: CPT

## 2020-03-23 PROCEDURE — 2700000000 HC OXYGEN THERAPY PER DAY

## 2020-03-23 PROCEDURE — 94003 VENT MGMT INPAT SUBQ DAY: CPT

## 2020-03-23 PROCEDURE — 6360000002 HC RX W HCPCS: Performed by: INTERNAL MEDICINE

## 2020-03-23 RX ADMIN — SODIUM CHLORIDE, PRESERVATIVE FREE 10 ML: 5 INJECTION INTRAVENOUS at 20:11

## 2020-03-23 RX ADMIN — POTASSIUM CHLORIDE: 2 INJECTION, SOLUTION, CONCENTRATE INTRAVENOUS at 18:12

## 2020-03-23 RX ADMIN — IPRATROPIUM BROMIDE AND ALBUTEROL SULFATE 1 AMPULE: .5; 3 SOLUTION RESPIRATORY (INHALATION) at 07:15

## 2020-03-23 RX ADMIN — Medication 4 MG/HR: at 11:16

## 2020-03-23 RX ADMIN — GABAPENTIN 300 MG: 250 SOLUTION ORAL at 23:33

## 2020-03-23 RX ADMIN — GABAPENTIN 300 MG: 250 SOLUTION ORAL at 06:36

## 2020-03-23 RX ADMIN — IPRATROPIUM BROMIDE AND ALBUTEROL SULFATE 1 AMPULE: .5; 3 SOLUTION RESPIRATORY (INHALATION) at 11:00

## 2020-03-23 RX ADMIN — METOCLOPRAMIDE 10 MG: 5 INJECTION, SOLUTION INTRAMUSCULAR; INTRAVENOUS at 06:36

## 2020-03-23 RX ADMIN — ESCITALOPRAM OXALATE 20 MG: 10 TABLET ORAL at 08:19

## 2020-03-23 RX ADMIN — PANTOPRAZOLE SODIUM 40 MG: 40 INJECTION, POWDER, FOR SOLUTION INTRAVENOUS at 08:18

## 2020-03-23 RX ADMIN — METOCLOPRAMIDE 10 MG: 5 INJECTION, SOLUTION INTRAMUSCULAR; INTRAVENOUS at 17:37

## 2020-03-23 RX ADMIN — BUSPIRONE HYDROCHLORIDE 20 MG: 10 TABLET ORAL at 14:15

## 2020-03-23 RX ADMIN — INSULIN LISPRO 1 UNITS: 100 INJECTION, SOLUTION INTRAVENOUS; SUBCUTANEOUS at 17:43

## 2020-03-23 RX ADMIN — SUCRALFATE 1 G: 1 TABLET ORAL at 23:33

## 2020-03-23 RX ADMIN — CLONAZEPAM 0.5 MG: 0.5 TABLET ORAL at 08:29

## 2020-03-23 RX ADMIN — IPRATROPIUM BROMIDE AND ALBUTEROL SULFATE 1 AMPULE: .5; 3 SOLUTION RESPIRATORY (INHALATION) at 15:08

## 2020-03-23 RX ADMIN — BUSPIRONE HYDROCHLORIDE 20 MG: 10 TABLET ORAL at 08:19

## 2020-03-23 RX ADMIN — SUCRALFATE 1 G: 1 TABLET ORAL at 06:36

## 2020-03-23 RX ADMIN — FLUCONAZOLE 400 MG: 2 INJECTION, SOLUTION INTRAVENOUS at 10:47

## 2020-03-23 RX ADMIN — Medication 2 MCG/MIN: at 06:55

## 2020-03-23 RX ADMIN — SODIUM CHLORIDE, PRESERVATIVE FREE 10 ML: 5 INJECTION INTRAVENOUS at 20:12

## 2020-03-23 RX ADMIN — SUCRALFATE 1 G: 1 TABLET ORAL at 13:02

## 2020-03-23 RX ADMIN — Medication 150 MCG/HR: at 05:22

## 2020-03-23 RX ADMIN — ALBUTEROL SULFATE 2.5 MG: 2.5 SOLUTION RESPIRATORY (INHALATION) at 23:39

## 2020-03-23 RX ADMIN — Medication 10 ML: at 20:11

## 2020-03-23 RX ADMIN — MEROPENEM 1 G: 1 INJECTION, POWDER, FOR SOLUTION INTRAVENOUS at 12:59

## 2020-03-23 RX ADMIN — METOCLOPRAMIDE 10 MG: 5 INJECTION, SOLUTION INTRAMUSCULAR; INTRAVENOUS at 23:33

## 2020-03-23 RX ADMIN — CLONAZEPAM 0.5 MG: 0.5 TABLET ORAL at 20:11

## 2020-03-23 RX ADMIN — Medication 150 MCG/HR: at 12:01

## 2020-03-23 RX ADMIN — PANTOPRAZOLE SODIUM 40 MG: 40 INJECTION, POWDER, FOR SOLUTION INTRAVENOUS at 20:10

## 2020-03-23 RX ADMIN — METOCLOPRAMIDE 10 MG: 5 INJECTION, SOLUTION INTRAMUSCULAR; INTRAVENOUS at 11:57

## 2020-03-23 RX ADMIN — SUCRALFATE 1 G: 1 TABLET ORAL at 17:38

## 2020-03-23 RX ADMIN — MINERAL OIL AND WHITE PETROLATUM: 150; 830 OINTMENT OPHTHALMIC at 04:00

## 2020-03-23 RX ADMIN — BISACODYL 10 MG: 10 SUPPOSITORY RECTAL at 08:19

## 2020-03-23 RX ADMIN — Medication 10 ML: at 08:20

## 2020-03-23 RX ADMIN — MEROPENEM 1 G: 1 INJECTION, POWDER, FOR SOLUTION INTRAVENOUS at 20:10

## 2020-03-23 RX ADMIN — IPRATROPIUM BROMIDE AND ALBUTEROL SULFATE 1 AMPULE: .5; 3 SOLUTION RESPIRATORY (INHALATION) at 19:42

## 2020-03-23 RX ADMIN — TRAZODONE HYDROCHLORIDE 100 MG: 100 TABLET ORAL at 20:11

## 2020-03-23 RX ADMIN — MEROPENEM 1 G: 1 INJECTION, POWDER, FOR SOLUTION INTRAVENOUS at 04:00

## 2020-03-23 RX ADMIN — I.V. FAT EMULSION 100 ML: 20 EMULSION INTRAVENOUS at 18:13

## 2020-03-23 RX ADMIN — GABAPENTIN 300 MG: 250 SOLUTION ORAL at 14:15

## 2020-03-23 RX ADMIN — Medication 125 MCG/HR: at 19:02

## 2020-03-23 RX ADMIN — BUSPIRONE HYDROCHLORIDE 20 MG: 10 TABLET ORAL at 20:11

## 2020-03-23 ASSESSMENT — PULMONARY FUNCTION TESTS
PIF_VALUE: 24
PIF_VALUE: 32
PIF_VALUE: 28
PIF_VALUE: 12
PIF_VALUE: 31

## 2020-03-23 ASSESSMENT — PAIN SCALES - GENERAL
PAINLEVEL_OUTOF10: 0
PAINLEVEL_OUTOF10: 4
PAINLEVEL_OUTOF10: 0

## 2020-03-23 NOTE — PROGRESS NOTES
Occupational Therapy    Occupational Therapy Not Seen Note    DATE: 3/23/2020  Name: Bismark Montes  : 1945  MRN: [id number]    Patient not available for Occupational Therapy due to:    Sedation: Intubated/sedated.         Electronically signed by JEWEL Regalado on 3/23/2020 at 9:42 AM

## 2020-03-23 NOTE — PROGRESS NOTES
Critical Care Team - Daily Progress Note      Date and time: 3/23/2020 6:56 AM  Patient's name:  Cece Farmer  Medical Record Number: [id number]  Patient's account/billing number: [de-identified]  Patient's YOB: 1945  Age: 76 y.o. Date of Admission: 2/29/2020 10:37 AM  Length of stay during current admission: 23      Primary Care Physician: Holly Fontana MD  ICU Attending Physician: Dr. Ruperto Rojas Status: Full Code    Reason for ICU admission: No chief complaint on file. Subjective:   OVERNIGHT EVENTS: No issues overnight noted. Patient was started back on Levophed yesterday in the morning because of low blood pressure. It went up to 10 later weaned off to 2 this morning. She is still intubated on PRVC mode 20/400/10/40. Feeding Diet: TPN    Fluids: [facility] 10  Family: Son considering withdrawing support if she is not going to get better. Analgesic: Fentanyl 150  Sedation: Versed 5  Thrombo-prophylaxis: [] Enoxaparin, [] Unfract. Heparin Subcutaneously, [x] EPC Cuffs  Mobility: none  Heads up:   Ulcer prophylaxis: [x] PPI Agent, [] E1Vyusd, [] Sucralfate, [] Other:  Glycemic control: good   Spontaneous breathing trial: none   Bowel regimen/urine output: Zero BM/24 hours, 2.4 L UOP/24 hrs, OG  ml/24 hrs, negative fluid balance of 895.    Indwelling catheter/lines: yes/ right basilic PICC line/ art line left radial.   De-escalation: still on meropenem day 7 and fluconazole day 4    AWAKE & FOLLOWING COMMANDS:  [] No   [x] Yes    CURRENT VENTILATION STATUS:     [x] Ventilator  [] BIPAP  [] Nasal Cannula [] Room Air      SECRETIONS Amount:  [x] Small [] Moderate  [] Large  [] None  Color:     [] White [] Colored  [] Bloody    SEDATION:  RAAS Score:  [] Propofol gtt  [x] Versed gtt  [] Ativan gtt   [] No Sedation    PARALYZED:  [x] No    [] Yes    DIARRHEA:                [x] No                [] Yes  (C. Difficile status: [] positive Mouth swabbed, Mouth suctioned  Subglottic Suction Done?: Yes  Cuff Pressure (cm H2O): 25 cm H2O  Skin barrier applied: No    ABGs:   Lab Results   Component Value Date    VNG8URA 40 03/23/2020    FIO2 45.0 03/23/2020     Lactic Acid:   Lab Results   Component Value Date    LACTA 4.1 04/30/2018     DATA:  Complete Blood Count:   Recent Labs     03/21/20  0609  03/22/20  0536 03/22/20  2354 03/23/20  0511   WBC 7.4  --  6.9  --  7.0   HGB 7.7*   < > 7.6* 7.9* 7.7*   MCV 98.0  --  96.1  --  97.7     --  200  --  173   RBC 2.48*  --  2.57*  --  2.59*   HCT 24.3*   < > 24.7* 25.8* 25.3*   MCH 31.0  --  29.6  --  29.7   MCHC 31.7  --  30.8  --  30.4   RDW 14.8*  --  15.9*  --  15.6*   MPV 10.7  --  10.7  --  10.5    < > = values in this interval not displayed. PT/INR:    Lab Results   Component Value Date    PROTIME 11.0 02/10/2020    INR 1.0 02/10/2020     PTT:  No results found for: APTT, PTT    Basal Metabolic Profile:   Recent Labs     03/21/20  0859 03/23/20  0511    147*   K 3.6* 4.1   BUN 14 15   CREATININE 0.43* 0.38*    105   CO2 27 32*      Magnesium:   Lab Results   Component Value Date    MG 1.9 03/23/2020    MG 1.7 03/20/2020    MG 1.8 03/18/2020     Phosphorus:   Lab Results   Component Value Date    PHOS 2.6 03/23/2020    PHOS 3.1 03/20/2020    PHOS 3.3 03/18/2020     S. Calcium:  Recent Labs     03/23/20  0511   CALCIUM 8.6     S. Ionized Calcium:No results for input(s): IONCA in the last 72 hours.       Urinalysis:   Lab Results   Component Value Date    NITRU NEGATIVE 03/17/2020    COLORU YELLOW 03/17/2020    PHUR 5.5 03/17/2020    WBCUA 0 TO 2 03/17/2020    RBCUA 20 TO 50 03/17/2020    MUCUS NOT REPORTED 03/17/2020    TRICHOMONAS NOT REPORTED 03/17/2020    YEAST MANY 03/17/2020    BACTERIA NOT REPORTED 03/17/2020    CLARITYU Clear 06/29/2018    SPECGRAV 1.013 03/17/2020    LEUKOCYTESUR NEGATIVE 03/17/2020    UROBILINOGEN Normal 03/17/2020    BILIRUBINUR NEGATIVE 03/17/2020 BILIRUBINUR Negative 06/29/2018    BLOODU Non-hemolyzed trace 06/29/2018    GLUCOSEU NEGATIVE 03/17/2020    KETUA NEGATIVE 03/17/2020    AMORPHOUS NOT REPORTED 03/17/2020       CARDIAC ENZYMES: No results for input(s): CKMB, CKMBINDEX, TROPONINI in the last 72 hours. Invalid input(s): CKTOTAL;3  BNP: No results for input(s): BNP in the last 72 hours. LFTS  No results for input(s): ALKPHOS, ALT, AST, BILITOT, BILIDIR, LABALBU in the last 72 hours. AMYLASE/LIPASE/AMMONIA  Recent Labs     03/21/20  0859   LIPASE 29       Last 3 Blood Glucose:   Recent Labs     03/21/20  0859 03/23/20  0511   GLUCOSE 120* 139*      HgBA1c:  No results found for: LABA1C    TSH:  No results found for: TSH  ANEMIA STUDIES  No results for input(s): LABIRON, TIBC, FERRITIN, [id number], FOLATE, OCCULTBLD in the last 72 hours. Cultures during this admission:   Blood cultures:                 []? None drawn      [x]? Negative             []?  Positive (Details:  )  Urine Culture:                   []? None drawn      []? Negative             [x]? Positive (Details: PRESUMPTIVE FLORA ALBICANS >[id number] CFU/MLAbnormal  )  Sputum Culture:               [x]? None drawn       [x]? Negative             []?  Positive (Details: )   Endotracheal aspirate:     []? None drawn       []? Negative             [x]? Positive (Details: PREDOMINANT ORGANISM: PSEUDOHYPHAE/HYPHAE SEENFEW BUDDING YEAST CELLS SEEN)   ASSESSMENT:     Active Problems:    Aspiration pneumonia (HCC)    Centrilobular emphysema (HCC)    Atelectasis    Pleural effusion    Esophageal dilatation    Upper GI bleeding    Acute on chronic blood loss anemia    Shock (HCC)    Fever    Acute postoperative respiratory insufficiency    Critical illness polyneuropathy ([facility] [address]  Resolved Problems:    * No resolved hospital problems. *    PLAN:   Acute hypoxic respiratory failure likely due to aspiration pneumonitis.  Worsening   Intubated 3/17 due to respiratory distress and high O2

## 2020-03-23 NOTE — PLAN OF CARE
Problem: OXYGENATION/RESPIRATORY FUNCTION  Goal: Patient will maintain patent airway  3/23/2020 0754 by Jamey Chavez RCP  Outcome: Ongoing     Problem: OXYGENATION/RESPIRATORY FUNCTION  Goal: Patient will achieve/maintain normal respiratory rate/effort  Description: Respiratory rate and effort will be within normal limits for the patient  3/23/2020 0754 by Jamey Chavez RCP  Outcome: Ongoing     Problem: MECHANICAL VENTILATION  Goal: Patient will maintain patent airway  3/23/2020 0754 by Jamey Chavez RCP  Outcome: Ongoing     Problem: MECHANICAL VENTILATION  Goal: Oral health is maintained or improved  3/23/2020 0754 by Jamey Chavez RCP  Outcome: Ongoing     Problem: MECHANICAL VENTILATION  Goal: ET tube will be managed safely  3/23/2020 0754 by Jamey Chavez RCP  Outcome: Ongoing     Problem: MECHANICAL VENTILATION  Goal: Ability to express needs and understand communication  3/23/2020 0754 by Jamey Chavez RCP  Outcome: Ongoing     Problem: MECHANICAL VENTILATION  Goal: Mobility/activity is maintained at optimum level for patient  3/23/2020 0754 by Jamey Chavez RCP  Outcome: Ongoing     Problem: SKIN INTEGRITY  Goal: Skin integrity is maintained or improved  3/23/2020 0754 by Jamey Chavez RCP  Outcome: Ongoing

## 2020-03-23 NOTE — PLAN OF CARE
RN  Outcome: Ongoing  Goal: Control of acute pain  Description: Control of acute pain  3/23/2020 1221 by Sam Live RN  Outcome: Ongoing  3/23/2020 0814 by Aroldo Villatoro RN  Outcome: Ongoing  Goal: Control of chronic pain  Description: Control of chronic pain  3/23/2020 0814 by Aroldo Villatoro RN  Outcome: Ongoing     Problem: Skin Integrity - Impaired:  Goal: Will show no infection signs and symptoms  Description: Will show no infection signs and symptoms  3/23/2020 1221 by Sam Live RN  Outcome: Ongoing  3/23/2020 0814 by Aroldo Villatoro RN  Outcome: Ongoing  Goal: Absence of new skin breakdown  Description: Absence of new skin breakdown  3/23/2020 0814 by Aroldo Villatoro RN  Outcome: Ongoing     Problem: Sleep Pattern Disturbance:  Goal: Appears well-rested  Description: Appears well-rested  3/23/2020 1221 by Sma Live RN  Outcome: Ongoing  3/23/2020 0814 by Aroldo Villatoro RN  Outcome: Ongoing     Problem: Risk for Impaired Skin Integrity  Goal: Tissue integrity - skin and mucous membranes  Description: Structural intactness and normal physiological function of skin and  mucous membranes.   3/23/2020 1221 by Sam Live RN  Outcome: Ongoing  3/23/2020 0814 by Aroldo Villatoro RN  Outcome: Ongoing     Problem: Falls - Risk of:  Goal: Will remain free from falls  Description: Will remain free from falls  3/23/2020 0814 by Aroldo Villatoro RN  Outcome: Ongoing  Goal: Absence of physical injury  Description: Absence of physical injury  3/23/2020 0814 by Aroldo Villatoro RN  Outcome: Ongoing     Problem: OXYGENATION/RESPIRATORY FUNCTION  Goal: Patient will maintain patent airway  3/23/2020 0814 by Aroldo Villatoro RN  Outcome: Ongoing  3/23/2020 0754 by Pedro Rg [facility]  Outcome: Ongoing  Goal: Patient will achieve/maintain normal respiratory rate/effort  Description: Respiratory rate and effort will be within normal limits for the patient  3/23/2020 0156 by Aroldo Villatoro RN  Outcome: Ramon Ware RN  Outcome: Ongoing  3/23/2020 0814 by Crystal Modi RN  Outcome: Ongoing     Problem: Restraint Use - Nonviolent/Non-Self-Destructive Behavior:  Goal: Absence of restraint-related injury  Description: Absence of restraint-related injury  3/23/2020 1221 by Alexandra Lynch RN  Outcome: Ongoing  3/23/2020 0814 by Crystal Modi RN  Outcome: Not Met This Shift     Problem: Restraint Use - Nonviolent/Non-Self-Destructive Behavior:  Goal: Absence of restraint indications  Description: Absence of restraint indications  3/23/2020 1221 by Alexandra Lynch RN  Outcome: Not Met This Shift  Note: Continue with bilateral soft wrist restraints d/t pt reaching for lines/tubes. RN to continue to monitor.   3/23/2020 0814 by Crystal Modi RN  Outcome: Not Met This Shift

## 2020-03-23 NOTE — CONSULTS
ICU and extubated on high flow and transferred out of ICU on 3/13. On 3/14 patient had a drop in her hemoglobin to 6.3 and developed coffee-ground and then bright red emesis and was transferred back to the MICU. General surgery performed endoscopy and showed an ulcer at the GE junction and mild gastritis with no active bleeding on 3/17. After the procedure patient developed acute respiratory distress and fever and was reintubated. ID has been consulted. Patient remains intubated and febrile and on pressors. Palliative care consulted for goals of care, CODE STATUS discussion, family support and symptom management.       Active Hospital Problems    Diagnosis Date Noted    Acute postoperative respiratory insufficiency [J95.89]     Critical illness polyneuropathy (HCC) [G62.81]     Shock ([address] [R57.9]     Fever [R50.9]     Upper GI bleeding [K92.2]     Acute on chronic blood loss anemia [D62]     Aspiration pneumonia (HCC) [J69.0] 02/29/2020    Centrilobular emphysema (HCC) [J43.2]     Atelectasis [J98.11]     Pleural effusion [J90]     Esophageal dilatation [K22.8]        PAST MEDICAL HISTORY      Diagnosis Date    Anxiety     Arthritis     Back pain     Bronchitis     Caffeine use     8 coffee / day    Carpal tunnel syndrome     Cataracts, bilateral     Constipation     Depression     Diarrhea     GERD (gastroesophageal reflux disease)     Hyperlipidemia     Hypertension     Mumps     MVP (mitral valve prolapse)     Dr. Feliz Trevino in May 2019    Recurrent UTI     Dr. Migue Rich    Sinusitis     Ulcerative esophagitis     Wellness examination     Dr. Mark Shaikh seen in Jan 2020       PAST SURGICAL HISTORY  Past Surgical History:   Procedure Laterality Date    APPENDECTOMY      CARPAL TUNNEL RELEASE      COLONOSCOPY      CYSTOSCOPY      EGD  3/17/2020         EYE SURGERY      patricia IOL    GASTRIC FUNDOPLICATION N/A 6/82/8701    XI LAPAROSCOPIC [address] [place] [address] FUNDOPLICATION performed by Melanie Rivero MD at [address] POWER PICC TRIPLE  3/4/2020         HIATAL HERNIA REPAIR  02/24/2020    LAPAROSCOPIC ROBOTIC HIATAL HERNIA REPAIR, CHERYL FUNDOPLICATION     HYSTERECTOMY      Abdominal    JOINT REPLACEMENT Right     right hip    OVARY REMOVAL      RHINOPLASTY      TONSILLECTOMY      TOTAL HIP ARTHROPLASTY      TOTAL NEPHRECTOMY      atrophic from infections, age 14    TUBAL LIGATION      UPPER GASTROINTESTINAL ENDOSCOPY N/A 3/17/2020    BEDSIDE EGD ESOPHAGOGASTRODUODENOSCOPY (ICU) performed by Melanie Rivero MD at [facility]       SOCIAL HISTORY  Social History     Tobacco Use    Smoking status: Current Every Day Smoker     Packs/day: 1.00     Years: 50.00     Pack years: 50.00     Types: Cigarettes    Smokeless tobacco: Never Used   Substance Use Topics    Alcohol use: No    Drug use: No       ALLERGIES  Allergies   Allergen Reactions    Compazine [Prochlorperazine Maleate]     Penicillin V Potassium     Penicillins Other (See Comments)     shock         MEDICATIONS  Current Medications    fluconazole  400 mg Intravenous Q24H    gabapentin  300 mg Oral 3 times per day    ipratropium-albuterol  1 ampule Inhalation 4x daily    albuterol  2.5 mg Nebulization BID    fat emulsion  100 mL Intravenous Daily    insulin lispro  0-6 Units Subcutaneous Q6H    midazolam  2 mg Intravenous Once    sucralfate  1 g Oral 4 times per day    meropenem  1 g Intravenous Q8H    pantoprazole  40 mg Intravenous BID    And    sodium chloride (PF)  10 mL Intravenous BID    sodium chloride  20 mL Intravenous Once    metoclopramide  10 mg Intravenous Q6H    milk and molasses  240 mL Rectal Once    bisacodyl  10 mg Rectal Daily    lidocaine 1 % injection  5 mL Intradermal Once    sodium chloride flush  10 mL Intravenous 2 times per day    traZODone  100 mg Oral Nightly    magnesium citrate  296 mL Oral Once    clonazePAM  0.5 mg Oral BID    [Held by provider] amitriptyline  10 mg Oral TID    [Held by provider] amLODIPine  10 mg Oral Nightly    sodium chloride flush  10 mL Intravenous 2 times per day    busPIRone  20 mg Oral TID    [Held by provider] metoprolol succinate  25 mg Oral Nightly    nicotine  1 patch Transdermal Daily    sodium chloride (PF)  10 mL Intravenous Daily    escitalopram  20 mg Oral Daily     artificial tears, mineral oil, acetaminophen **OR** acetaminophen, albuterol, glucose, dextrose, glucagon (rDNA), dextrose, LORazepam, dextromethorphan-guaiFENesin, sodium chloride flush, potassium chloride **OR** potassium alternative oral replacement **OR** potassium chloride, magnesium hydroxide, sodium chloride flush, polyethylene glycol, glycerin moisturizing mouth spray, metoprolol, ondansetron  IV Drips/Infusions   PN-Adult 2-in-1 Central Line (Standard) 65 mL/hr at 03/22/20 1831    fentaNYL 100 mcg/hr (03/23/20 0901)    dextrose      sodium chloride 10 mL/hr at 03/22/20 0620    norepinephrine Stopped (03/23/20 0656)    midazolam Stopped (03/23/20 0756)     Home Medications  No current facility-administered medications on file prior to encounter.       Current Outpatient Medications on File Prior to Encounter   Medication Sig Dispense Refill    escitalopram (LEXAPRO) 20 MG tablet Take 20 mg by mouth daily      traZODone (DESYREL) 100 MG tablet Take 100 mg by mouth nightly as needed for Sleep      busPIRone (BUSPAR) 10 MG tablet Take 20 mg by mouth 3 times daily      ondansetron (ZOFRAN) 4 MG tablet Take 1 tablet by mouth daily as needed for Nausea or Vomiting 30 tablet 0    pantoprazole (PROTONIX) 40 MG tablet Take 1 tablet by mouth every morning (before breakfast) 30 tablet 5    RA VITAMIN D-3 50 MCG (2000 UT) CAPS take 1 capsule by mouth once daily      famotidine (PEPCID) 10 MG tablet Take 10 mg by mouth nightly      amitriptyline (ELAVIL) 25 MG tablet Take 25 mg by mouth three times daily       amLODIPine (NORVASC) 10 MG tablet Take 15 mg by mouth nightly Takes with 5 mg tab for total daily dose of 15 mg      Oyster Shell 500 MG TABS Take 500 mg by mouth 2 times daily       clonazePAM (KLONOPIN) 0.5 MG tablet Take 0.5 mg by mouth 2 times daily. Taking 1/2 tablet BID      polyethylene glycol (MIRALAX) PACK packet Take 17 g by mouth daily as needed      Acetaminophen 500 MG CAPS Take 1 tablet by mouth as needed for Pain (follow OTC instructions)      calcium carbonate (TUMS) 500 MG chewable tablet Take 1 tablet by mouth 3 times daily as needed for Heartburn      metoprolol succinate (TOPROL XL) 25 MG extended release tablet take 1 tablet by mouth once daily  0    gabapentin (NEURONTIN) 300 MG capsule Take 300 mg by mouth 3 times daily.  simvastatin (ZOCOR) 40 MG tablet Take 40 mg by mouth nightly. Data         BP (!) 98/50   Pulse 83   Temp 98.5 °F (36.9 °C) (Oral)   Resp 20   Ht 5' 2\" (1.575 m)   Wt 209 lb 10.5 oz (95.1 kg)   SpO2 98%   BMI 38.35 kg/m²     Wt Readings from Last 3 Encounters:   03/22/20 209 lb 10.5 oz (95.1 kg)   02/29/20 186 lb 1.1 oz (84.4 kg)   02/24/20 183 lb 6.4 oz (83.2 kg)        Code Status: Full Code     ADVANCED CARE PLANNING:  Patient has capacity for medical decisions: no  Health Care Power of : no  Living Will: no     Personal, Social, and Family History  Marital Status: not asked  Living situation:not asked  Does patient understand diagnosis/treatment? no  Does caregiver understand diagnosis/treatment? not asked      Assessment        REVIEW OF SYSTEMS    ROS unable to be done, patient intubated and sedated    PHYSICAL ASSESSMENT:  Constitutional: Intubated, sedated  Head: Normocephalic and atraumatic. Eyes: EOM are normal. Pupils are equal, round   Neck: Normal range of motion. Neck supple. No tracheal deviation present.    Cardiovascular: Normal rate and regular rhythm, S1, S2, no murmur   Pulmonary/Chest: On mechanical ventilator, no rales or wheezes. Abdomen: Soft. No tenderness, not distended, no ascites, no organomegaly   Musculoskeletal: Normal range of motion. ,+ edema lower ext. Neurological: Intubated, sedated, not following commands  Skin: Normal turgor, no bleeding, no bruising     Palliative Performance Scale:  ___60%  Ambulation reduced; Significant disease; Can't do hobbies/housework; intake normal or reduced; occasional assist; LOC full/confusion  ___50%  Mainly sit/lie; Extensive disease; Can't do any work; Considerable assist; intake normal or reduced; LOC full/confusion  ___40%  Mainly in bed; Extensive disease; Mainly assist; intake normal or reduced; LOC full/confusion   ___30%  Bed Bound; Extensive disease; Total care; intake reduced; LOCfull/confusion  __x_20%  Bed Bound; Extensive disease; Total care; intake minimal; Drowsy/coma  ___10%  Bed Bound; Extensive disease;  Total care; Mouth care only; Drowsy/coma  ___0       Death      Plan      Palliative Interaction:  The patient was seen today and remains intubated, sedated and not following commands    No family member was present in patient's room    I met with critical care team and attending Dr. Lina Canavan and discussed patient's current medical conditions with them    Dr. Lina Canavan informed me that patient is showing some clinical improvement and he talked with patient's son today and discussed patient's current medical conditions with him    Dr. Lina Canavan told me that they would want to see for few day how the patient does clinically    I offered comfort and emotional support to the patient      Education/support to staff  Education/support to family  Education/support to patient  Discharge planning/helping to coordinate care  Communications with primary service  Caregiver support/education  Code status clarified: Full Code  Code status clarified: [facility]  Code status clarified: [facility]  Other major issues     Principle Problem/Diagnosis:  Acute respiratory distress    Additional Assessments:   Active Problems:    Aspiration pneumonia (HCC)    Centrilobular emphysema (HCC)    Atelectasis    Pleural effusion    Esophageal dilatation    Upper GI bleeding    Acute on chronic blood loss anemia    Shock (HCC)    Fever    Acute postoperative respiratory insufficiency    Critical illness polyneuropathy (HCC)    1- Symptom management/ pain control     Pain Assessment:  Pain is controlled with current analgesics. Medication(s) being used: acetaminophen, narcotic analgesics including fentanyl IV               Anxiety:  none                          Dyspnea:  none                          Fatigue:  none    Other: Intubated and sedated    We feel the patient symptoms are being controlled. her current regimen is reviewed by myself and discussed with the staff. 2- Goals of care evaluation   The patient goals of care are spiritual needs, strengthening relationships, preserve independence/autonomy/control and support for family/caregiver   Goals of care discussed with:    [] Patient independently    [] Patient and Family    [] Family or Healthcare DPOA independently    [x] Unable to discuss with patient, family/DPOA not present    3- Code Status  Full Code    4- Other recommendations   - We will continue to provide comfort and support to the patient and the family  Please call with any palliative questions or concerns. Palliative Care Team is available via perfect serve or via phone. Palliative Care will continue to follow Ms. Ramirez's care as needed. Thank you for allowing Palliative Care to participate in the care of Ms. Hanna Fuller . This note has been dictated by dragon, typing errors may be a possibility.     The total time I spent in seeing the patient, discussing goals of care, advanced directives, code status and other major issues was more than 55 minutes      Electronically signed by   Amaury Camargo MD  Palliative Care Team  on 3/23/2020 at 9:26 AM    Palliative care office: [phone]

## 2020-03-23 NOTE — PLAN OF CARE
Problem: Pain:  Goal: Pain level will decrease  Description: Pain level will decrease  3/23/2020 0814 by Germaine Kyle RN  Outcome: Ongoing  3/22/2020 1949 by Clayton Millan RN  Outcome: Ongoing  Goal: Control of acute pain  Description: Control of acute pain  3/23/2020 0814 by Germaine Kyle RN  Outcome: Ongoing  3/22/2020 1949 by Clayton Millan RN  Outcome: Ongoing  Goal: Control of chronic pain  Description: Control of chronic pain  3/23/2020 0814 by Germaine Kyle RN  Outcome: Ongoing  3/22/2020 1949 by Clayton Millan RN  Outcome: Ongoing     Problem: Discharge Planning:  Goal: Participates in care planning  Description: Participates in care planning  3/23/2020 0814 by Germaine Kyle RN  Outcome: Ongoing  3/22/2020 1949 by Clayton Millan RN  Outcome: Ongoing  Goal: Discharged to appropriate level of care  Description: Discharged to appropriate level of care  3/23/2020 0814 by Germaine Kyle RN  Outcome: Ongoing  3/22/2020 1949 by Clayton Millan RN  Outcome: Ongoing     Problem: Anxiety/Stress:  Goal: Level of anxiety will decrease  Description: Level of anxiety will decrease  3/23/2020 0814 by Germaine Kyle RN  Outcome: Ongoing  3/22/2020 1949 by Clayton Millan RN  Outcome: Ongoing     Problem:  Bowel Function - Altered:  Goal: Bowel elimination is within specified parameters  Description: Bowel elimination is within specified parameters  3/23/2020 0814 by Germaine Kyle RN  Outcome: Ongoing  3/22/2020 1949 by Clayton Millan RN  Outcome: Ongoing     Problem: Nutrition Deficit:  Goal: Ability to achieve adequate nutritional intake will improve  Description: Ability to achieve adequate nutritional intake will improve  3/23/2020 0814 by Germaine Kyle RN  Outcome: Ongoing  3/22/2020 1949 by Clayton Millan RN  Outcome: Ongoing     Problem: Pain:  Goal: Recognizes and communicates pain  Description: Recognizes and communicates pain  3/23/2020 0814 by Germaine Kyle RN  Outcome: Ongoing  3/22/2020 Casi Hays RCP  Outcome: Ongoing  3/22/2020 1949 by Tina Raya RN  Outcome: Ongoing  3/22/2020 1940 by Xochitl Castillo RCP  Outcome: Ongoing  Goal: Patient will achieve/maintain normal respiratory rate/effort  Description: Respiratory rate and effort will be within normal limits for the patient  3/23/2020 [id number] by Geno Hernandez RN  Outcome: Ongoing  3/23/2020 0754 by Casi Hays RCP  Outcome: Ongoing  3/22/2020 1949 by Tina Raya RN  Outcome: Ongoing  3/22/2020 1940 by Xochitl Castillo RCP  Outcome: Ongoing     Problem: MECHANICAL VENTILATION  Goal: Patient will maintain patent airway  3/23/2020 0814 by Geno Hernandez RN  Outcome: Ongoing  3/23/2020 0754 by Casi Hays RCP  Outcome: Ongoing  3/22/2020 1949 by Tina Raya RN  Outcome: Ongoing  3/22/2020 1940 by Xochitl Castillo RCP  Outcome: Ongoing  Goal: Oral health is maintained or improved  3/23/2020 0814 by Geno Hernandez RN  Outcome: Ongoing  3/23/2020 0754 by Casi Hays RCP  Outcome: Ongoing  3/22/2020 1949 by Tina Raya RN  Outcome: Ongoing  3/22/2020 1940 by Xochitl Castillo RCP  Outcome: Ongoing  Goal: ET tube will be managed safely  3/23/2020 0814 by Geno Hernandez RN  Outcome: Ongoing  3/23/2020 0754 by Casi Hays RCP  Outcome: Ongoing  3/22/2020 1949 by Tina Raya RN  Outcome: Ongoing  3/22/2020 1940 by Xochitl Castillo RCP  Outcome: Ongoing  Goal: Ability to express needs and understand communication  3/23/2020 0814 by Geno Hernandez RN  Outcome: Ongoing  3/23/2020 0754 by aCsi Hays RCP  Outcome: Ongoing  3/22/2020 1949 by Tina Raya RN  Outcome: Ongoing  3/22/2020 1940 by Xochitl Castillo RCP  Outcome: Ongoing  Goal: Mobility/activity is maintained at optimum level for patient  3/23/2020 0814 by Geno Hernandez RN  Outcome: Ongoing  3/23/2020 0754 by Casi Hays RCP  Outcome: Ongoing  3/22/2020 1949 by Tina Raya RN  Outcome: Ongoing  3/22/2020 1940 by Xochitl Castillo RCP  Outcome:

## 2020-03-23 NOTE — PROGRESS NOTES
incisions clean    Medical Decision Making -Laboratory:   I have independently reviewed/ordered the following labs:    CBC with Differential:   Recent Labs     03/22/20  0536  03/23/20  0511 03/23/20  1134   WBC 6.9  --  7.0  --    HGB 7.6*   < > 7.7* 9.0*   HCT 24.7*   < > 25.3* 29.4*     --  173  --    LYMPHOPCT 16*  --  14*  --    MONOPCT 4  --  4  --     < > = values in this interval not displayed. BMP:   Recent Labs     03/21/20  0859 03/23/20  0511    147*   K 3.6* 4.1    105   CO2 27 32*   BUN 14 15   CREATININE 0.43* 0.38*   MG  --  1.9     Hepatic Function Panel:   No results for input(s): PROT, LABALBU, BILIDIR, IBILI, BILITOT, ALKPHOS, ALT, AST in the last 72 hours. No results for input(s): RPR in the last 72 hours. No results for input(s): HIV in the last 72 hours. No results for input(s): BC in the last 72 hours. Lab Results   Component Value Date    MUCUS NOT REPORTED 03/17/2020    RBC 2.59 03/23/2020    TRICHOMONAS NOT REPORTED 03/17/2020    WBC 7.0 03/23/2020    YEAST MANY 03/17/2020    TURBIDITY CLOUDY 03/17/2020     Lab Results   Component Value Date    CREATININE 0.38 03/23/2020    GLUCOSE 139 03/23/2020       Medical Decision Making-Imaging:     3-23 CXR    EXAMINATION:   ONE XRAY VIEW OF THE CHEST       3/23/2020 6:51 am       COMPARISON:   03/22/2020       HISTORY:   ORDERING SYSTEM PROVIDED HISTORY: Intubated; eval for change   TECHNOLOGIST PROVIDED HISTORY:   Intubated; eval for change   Reason for Exam: uprt port   Acuity: Unknown   Type of Exam: Unknown       Respiratory failure.       FINDINGS:   Endotracheal tube projects 2.6 cm from the sangeeta.  Enteric tube passes   beneath the diaphragm.  Enlarged heart.  Multifocal opacities right greater   than left, similar to before accounting for positional differences.  No   pneumothorax.           Impression   Stable multifocal opacities accounting for positional differences.          CT chest PE: 3/17  CT PA: No gross

## 2020-03-24 ENCOUNTER — APPOINTMENT (OUTPATIENT)
Dept: GENERAL RADIOLOGY | Age: 75
DRG: 004 | End: 2020-03-24
Attending: INTERNAL MEDICINE
Payer: MEDICARE

## 2020-03-24 LAB
ABSOLUTE EOS #: 0.13 K/UL (ref 0–0.4)
ABSOLUTE IMMATURE GRANULOCYTE: 0.2 K/UL (ref 0–0.3)
ABSOLUTE LYMPH #: 1.37 K/UL (ref 1–4.8)
ABSOLUTE MONO #: 0.2 K/UL (ref 0.1–0.8)
ALLEN TEST: ABNORMAL
BASOPHILS # BLD: 0 % (ref 0–2)
BASOPHILS ABSOLUTE: 0 K/UL (ref 0–0.2)
DIFFERENTIAL TYPE: ABNORMAL
EOSINOPHILS RELATIVE PERCENT: 2 % (ref 1–4)
FIO2: 45
GLUCOSE BLD-MCNC: 108 MG/DL (ref 65–105)
GLUCOSE BLD-MCNC: 111 MG/DL (ref 65–105)
GLUCOSE BLD-MCNC: 127 MG/DL (ref 65–105)
GLUCOSE BLD-MCNC: 128 MG/DL (ref 65–105)
GLUCOSE BLD-MCNC: 139 MG/DL (ref 65–105)
HCT VFR BLD CALC: 26.5 % (ref 36.3–47.1)
HEMOGLOBIN: 8 G/DL (ref 11.9–15.1)
IMMATURE GRANULOCYTES: 3 %
LACTIC ACID, WHOLE BLOOD: 1.2 MMOL/L (ref 0.7–2.1)
LYMPHOCYTES # BLD: 21 % (ref 24–44)
MCH RBC QN AUTO: 29.9 PG (ref 25.2–33.5)
MCHC RBC AUTO-ENTMCNC: 30.2 G/DL (ref 28.4–34.8)
MCV RBC AUTO: 98.9 FL (ref 82.6–102.9)
MODE: ABNORMAL
MONOCYTES # BLD: 3 % (ref 1–7)
MORPHOLOGY: ABNORMAL
MORPHOLOGY: ABNORMAL
NEGATIVE BASE EXCESS, ART: ABNORMAL (ref 0–2)
NRBC AUTOMATED: 0 PER 100 WBC
O2 DEVICE/FLOW/%: ABNORMAL
PATIENT TEMP: ABNORMAL
PDW BLD-RTO: 15.6 % (ref 11.8–14.4)
PLATELET # BLD: 190 K/UL (ref 138–453)
PLATELET ESTIMATE: ABNORMAL
PMV BLD AUTO: 10.6 FL (ref 8.1–13.5)
POC HCO3: 37.9 MMOL/L (ref 21–28)
POC O2 SATURATION: 97 % (ref 94–98)
POC PCO2 TEMP: ABNORMAL MM HG
POC PCO2: 53 MM HG (ref 35–48)
POC PH TEMP: ABNORMAL
POC PH: 7.46 (ref 7.35–7.45)
POC PO2 TEMP: ABNORMAL MM HG
POC PO2: 86.4 MM HG (ref 83–108)
POSITIVE BASE EXCESS, ART: 13 (ref 0–3)
RBC # BLD: 2.68 M/UL (ref 3.95–5.11)
RBC # BLD: ABNORMAL 10*6/UL
SAMPLE SITE: ABNORMAL
SEG NEUTROPHILS: 71 % (ref 36–66)
SEGMENTED NEUTROPHILS ABSOLUTE COUNT: 4.6 K/UL (ref 1.8–7.7)
TCO2 (CALC), ART: 40 MMOL/L (ref 22–29)
WBC # BLD: 6.5 K/UL (ref 3.5–11.3)
WBC # BLD: ABNORMAL 10*3/UL

## 2020-03-24 PROCEDURE — 6370000000 HC RX 637 (ALT 250 FOR IP): Performed by: STUDENT IN AN ORGANIZED HEALTH CARE EDUCATION/TRAINING PROGRAM

## 2020-03-24 PROCEDURE — 99024 POSTOP FOLLOW-UP VISIT: CPT | Performed by: SURGERY

## 2020-03-24 PROCEDURE — 99291 CRITICAL CARE FIRST HOUR: CPT | Performed by: INTERNAL MEDICINE

## 2020-03-24 PROCEDURE — 6360000002 HC RX W HCPCS: Performed by: STUDENT IN AN ORGANIZED HEALTH CARE EDUCATION/TRAINING PROGRAM

## 2020-03-24 PROCEDURE — 71045 X-RAY EXAM CHEST 1 VIEW: CPT

## 2020-03-24 PROCEDURE — 2580000003 HC RX 258: Performed by: STUDENT IN AN ORGANIZED HEALTH CARE EDUCATION/TRAINING PROGRAM

## 2020-03-24 PROCEDURE — 2700000000 HC OXYGEN THERAPY PER DAY

## 2020-03-24 PROCEDURE — 2500000003 HC RX 250 WO HCPCS: Performed by: STUDENT IN AN ORGANIZED HEALTH CARE EDUCATION/TRAINING PROGRAM

## 2020-03-24 PROCEDURE — 83605 ASSAY OF LACTIC ACID: CPT

## 2020-03-24 PROCEDURE — 6360000002 HC RX W HCPCS: Performed by: INTERNAL MEDICINE

## 2020-03-24 PROCEDURE — 94003 VENT MGMT INPAT SUBQ DAY: CPT

## 2020-03-24 PROCEDURE — 2000000000 HC ICU R&B

## 2020-03-24 PROCEDURE — 6370000000 HC RX 637 (ALT 250 FOR IP): Performed by: INTERNAL MEDICINE

## 2020-03-24 PROCEDURE — C9113 INJ PANTOPRAZOLE SODIUM, VIA: HCPCS | Performed by: STUDENT IN AN ORGANIZED HEALTH CARE EDUCATION/TRAINING PROGRAM

## 2020-03-24 PROCEDURE — 94664 DEMO&/EVAL PT USE INHALER: CPT

## 2020-03-24 PROCEDURE — 85025 COMPLETE CBC W/AUTO DIFF WBC: CPT

## 2020-03-24 PROCEDURE — 94770 HC ETCO2 MONITOR DAILY: CPT

## 2020-03-24 PROCEDURE — 82803 BLOOD GASES ANY COMBINATION: CPT

## 2020-03-24 PROCEDURE — 94640 AIRWAY INHALATION TREATMENT: CPT

## 2020-03-24 PROCEDURE — 94761 N-INVAS EAR/PLS OXIMETRY MLT: CPT

## 2020-03-24 PROCEDURE — 6360000002 HC RX W HCPCS: Performed by: EMERGENCY MEDICINE

## 2020-03-24 PROCEDURE — 37799 UNLISTED PX VASCULAR SURGERY: CPT

## 2020-03-24 PROCEDURE — 99233 SBSQ HOSP IP/OBS HIGH 50: CPT | Performed by: INTERNAL MEDICINE

## 2020-03-24 RX ORDER — IPRATROPIUM BROMIDE AND ALBUTEROL SULFATE 2.5; .5 MG/3ML; MG/3ML
1 SOLUTION RESPIRATORY (INHALATION) EVERY 6 HOURS
Status: DISCONTINUED | OUTPATIENT
Start: 2020-03-24 | End: 2020-03-26

## 2020-03-24 RX ORDER — LABETALOL HYDROCHLORIDE 5 MG/ML
10 INJECTION, SOLUTION INTRAVENOUS ONCE
Status: DISCONTINUED | OUTPATIENT
Start: 2020-03-24 | End: 2020-04-06 | Stop reason: HOSPADM

## 2020-03-24 RX ADMIN — SUCRALFATE 1 G: 1 TABLET ORAL at 23:33

## 2020-03-24 RX ADMIN — METOCLOPRAMIDE 10 MG: 5 INJECTION, SOLUTION INTRAMUSCULAR; INTRAVENOUS at 06:54

## 2020-03-24 RX ADMIN — GABAPENTIN 300 MG: 250 SOLUTION ORAL at 22:09

## 2020-03-24 RX ADMIN — Medication 125 MCG/HR: at 03:00

## 2020-03-24 RX ADMIN — Medication 100 MCG/HR: at 09:44

## 2020-03-24 RX ADMIN — FLUCONAZOLE 400 MG: 2 INJECTION, SOLUTION INTRAVENOUS at 09:54

## 2020-03-24 RX ADMIN — MEROPENEM 1 G: 1 INJECTION, POWDER, FOR SOLUTION INTRAVENOUS at 20:08

## 2020-03-24 RX ADMIN — BUSPIRONE HYDROCHLORIDE 20 MG: 10 TABLET ORAL at 08:40

## 2020-03-24 RX ADMIN — BUSPIRONE HYDROCHLORIDE 20 MG: 10 TABLET ORAL at 20:20

## 2020-03-24 RX ADMIN — TRAZODONE HYDROCHLORIDE 100 MG: 100 TABLET ORAL at 20:20

## 2020-03-24 RX ADMIN — ESCITALOPRAM OXALATE 20 MG: 10 TABLET ORAL at 08:40

## 2020-03-24 RX ADMIN — SODIUM CHLORIDE, PRESERVATIVE FREE 10 ML: 5 INJECTION INTRAVENOUS at 22:10

## 2020-03-24 RX ADMIN — MAGNESIUM HYDROXIDE 30 ML: 400 SUSPENSION ORAL at 12:26

## 2020-03-24 RX ADMIN — METOCLOPRAMIDE 10 MG: 5 INJECTION, SOLUTION INTRAMUSCULAR; INTRAVENOUS at 12:23

## 2020-03-24 RX ADMIN — ACETAMINOPHEN 650 MG: 325 TABLET ORAL at 06:54

## 2020-03-24 RX ADMIN — METOPROLOL TARTRATE 5 MG: 5 INJECTION, SOLUTION INTRAVENOUS at 18:07

## 2020-03-24 RX ADMIN — BISACODYL 10 MG: 10 SUPPOSITORY RECTAL at 08:40

## 2020-03-24 RX ADMIN — CLONAZEPAM 0.5 MG: 0.5 TABLET ORAL at 08:42

## 2020-03-24 RX ADMIN — GABAPENTIN 300 MG: 250 SOLUTION ORAL at 06:54

## 2020-03-24 RX ADMIN — I.V. FAT EMULSION 100 ML: 20 EMULSION INTRAVENOUS at 17:54

## 2020-03-24 RX ADMIN — SUCRALFATE 1 G: 1 TABLET ORAL at 12:24

## 2020-03-24 RX ADMIN — SUCRALFATE 1 G: 1 TABLET ORAL at 18:42

## 2020-03-24 RX ADMIN — BUSPIRONE HYDROCHLORIDE 20 MG: 10 TABLET ORAL at 15:13

## 2020-03-24 RX ADMIN — METOCLOPRAMIDE 10 MG: 5 INJECTION, SOLUTION INTRAMUSCULAR; INTRAVENOUS at 17:54

## 2020-03-24 RX ADMIN — IPRATROPIUM BROMIDE AND ALBUTEROL SULFATE 1 AMPULE: .5; 3 SOLUTION RESPIRATORY (INHALATION) at 19:37

## 2020-03-24 RX ADMIN — Medication 6 MG/HR: at 00:57

## 2020-03-24 RX ADMIN — POTASSIUM CHLORIDE: 2 INJECTION, SOLUTION, CONCENTRATE INTRAVENOUS at 17:54

## 2020-03-24 RX ADMIN — Medication 10 ML: at 08:39

## 2020-03-24 RX ADMIN — GABAPENTIN 300 MG: 250 SOLUTION ORAL at 15:14

## 2020-03-24 RX ADMIN — ACETAMINOPHEN 650 MG: 325 TABLET ORAL at 20:19

## 2020-03-24 RX ADMIN — MEROPENEM 1 G: 1 INJECTION, POWDER, FOR SOLUTION INTRAVENOUS at 04:34

## 2020-03-24 RX ADMIN — MEROPENEM 1 G: 1 INJECTION, POWDER, FOR SOLUTION INTRAVENOUS at 12:23

## 2020-03-24 RX ADMIN — PANTOPRAZOLE SODIUM 40 MG: 40 INJECTION, POWDER, FOR SOLUTION INTRAVENOUS at 08:40

## 2020-03-24 RX ADMIN — IPRATROPIUM BROMIDE AND ALBUTEROL SULFATE 1 AMPULE: .5; 3 SOLUTION RESPIRATORY (INHALATION) at 16:11

## 2020-03-24 RX ADMIN — MINERAL OIL AND WHITE PETROLATUM: 150; 830 OINTMENT OPHTHALMIC at 04:34

## 2020-03-24 RX ADMIN — METOCLOPRAMIDE 10 MG: 5 INJECTION, SOLUTION INTRAMUSCULAR; INTRAVENOUS at 23:33

## 2020-03-24 RX ADMIN — ALBUTEROL SULFATE 2.5 MG: 2.5 SOLUTION RESPIRATORY (INHALATION) at 04:27

## 2020-03-24 RX ADMIN — PANTOPRAZOLE SODIUM 40 MG: 40 INJECTION, POWDER, FOR SOLUTION INTRAVENOUS at 22:09

## 2020-03-24 RX ADMIN — IPRATROPIUM BROMIDE AND ALBUTEROL SULFATE 1 AMPULE: .5; 3 SOLUTION RESPIRATORY (INHALATION) at 12:26

## 2020-03-24 RX ADMIN — CLONAZEPAM 0.5 MG: 0.5 TABLET ORAL at 20:20

## 2020-03-24 RX ADMIN — Medication 150 MCG/HR: at 19:01

## 2020-03-24 RX ADMIN — IPRATROPIUM BROMIDE AND ALBUTEROL SULFATE 1 AMPULE: .5; 3 SOLUTION RESPIRATORY (INHALATION) at 08:01

## 2020-03-24 RX ADMIN — SUCRALFATE 1 G: 1 TABLET ORAL at 06:54

## 2020-03-24 ASSESSMENT — PULMONARY FUNCTION TESTS
PIF_VALUE: 37
PIF_VALUE: 34
PIF_VALUE: 37
PIF_VALUE: 35
PIF_VALUE: 40
PIF_VALUE: 35
PIF_VALUE: 23
PIF_VALUE: 31
PIF_VALUE: 38
PIF_VALUE: 37
PIF_VALUE: 39
PIF_VALUE: 16
PIF_VALUE: 34
PIF_VALUE: 37
PIF_VALUE: 35
PIF_VALUE: 35
PIF_VALUE: 29

## 2020-03-24 ASSESSMENT — PAIN SCALES - GENERAL: PAINLEVEL_OUTOF10: 0

## 2020-03-24 NOTE — PROGRESS NOTES
17 g, Daily PRN  glycerin moisturizing mouth spray, 2 spray, PRN  metoprolol, 5 mg, Q6H PRN  ondansetron, 4 mg, Q6H PRN        SUPPORT DEVICES: [x] Ventilator [] BIPAP  [] Nasal Cannula [] Room Air    VENT SETTINGS (Comprehensive) (if applicable):  Vent Information  $Ventilation: $Subsequent Day  Ventilator Started: Yes  Ventilator Stopped: Yes  Skin Assessment: Clean, dry, & intact  Equipment ID: [id number]  Equipment Changed: HME  Vent Type: Servo i  Vent Mode: PRVC  Vt Ordered: 400 mL  Pressure Ordered: 7  Rate Set: 30 bmp  Pressure Support: 8 cmH20  FiO2 : 45 %  Sensitivity: 1  PEEP/CPAP: 10  I Time/ I Time %: 0.75 s  Cuff Pressure (cm H2O): 25 cm H2O  Humidification Source: HME  Humidification Temp: 31  Humidification Temp Measured: 31  Circuit Condensation: Drained  Nitric Oxide/Epoprostenol In Use?: No  Additional Respiratory  Assessments  Pulse: 94  Resp: 28  SpO2: 99 %  End Tidal CO2: 45 (%)  pCO2 (TCOM, mmHg): 36 mmHg  Position: Semi-Shelton's  Humidification Source: HME  Humidification Temp: 31  Circuit Condensation: Drained  Oral Care Completed?: Yes  Oral Care: Mouth swabbed, Mouth moisturizer, Mouth suctioned  Subglottic Suction Done?: Yes  Cuff Pressure (cm H2O): 25 cm H2O  Skin barrier applied: No    ABGs:   Lab Results   Component Value Date    VUT6RAS 40 03/24/2020    FIO2 45.0 03/24/2020     Lactic Acid:   Lab Results   Component Value Date    LACTA 4.1 04/30/2018     DATA:  Complete Blood Count:   Recent Labs     03/22/20  0536  03/23/20  0511 03/23/20  1134 03/24/20  0557   WBC 6.9  --  7.0  --  6.5   HGB 7.6*   < > 7.7* 9.0* 8.0*   MCV 96.1  --  97.7  --  98.9     --  173  --  190   RBC 2.57*  --  2.59*  --  2.68*   HCT 24.7*   < > 25.3* 29.4* 26.5*   MCH 29.6  --  29.7  --  29.9   MCHC 30.8  --  30.4  --  30.2   RDW 15.9*  --  15.6*  --  15.6*   MPV 10.7  --  10.5  --  10.6    < > = values in this interval not displayed.         PT/INR:    Lab Results   Component Value Date    PROTIME

## 2020-03-24 NOTE — PROGRESS NOTES
241-->192-> 228--> 200->173    3-17 Viral PCR negative    Cultures:  Urine:  · 3-17: Candida albicans  Blood:  · 3-17: No growth  Sputum :  · Tracheal aspirate 3-17: Pseudohyphae, few budding yeast cells seen. Candida albicans  Wound:  ·     Discussed with patient, RN, CC. I have personally reviewed the past medical history, past surgical history, medications, social history, and family history, and I have updated the database accordingly.   Past Medical History:     Past Medical History:   Diagnosis Date    Anxiety     Arthritis     Back pain     Bronchitis     Caffeine use     8 coffee / day    Carpal tunnel syndrome     Cataracts, bilateral     Constipation     Depression     Diarrhea     GERD (gastroesophageal reflux disease)     Hyperlipidemia     Hypertension     Mumps     MVP (mitral valve prolapse)     Dr. Vangie Mike in May 2019    Recurrent UTI     Dr. Davis Bone    Sinusitis     Ulcerative esophagitis     Wellness examination     Dr. Lucille Gaming seen in Jan 2020       Past Surgical  History:     Past Surgical History:   Procedure Laterality Date    APPENDECTOMY      CARPAL TUNNEL RELEASE      COLONOSCOPY      CYSTOSCOPY      EGD  3/17/2020         EYE SURGERY      patricia IOL    GASTRIC FUNDOPLICATION N/A 9/86/8553    XI LAPAROSCOPIC ROBOTIC HIATAL HERNIA REPAIR, CHERYL FUNDOPLICATION performed by Elijah Bass MD at [address] [place] [address] [address] CATH POWER PICC TRIPLE  3/4/2020        [facility]  02/24/2020    LAPAROSCOPIC ROBOTIC HIATAL HERNIA REPAIR, CHERYL FUNDOPLICATION     HYSTERECTOMY      Abdominal    JOINT REPLACEMENT Right     right hip    OVARY REMOVAL      RHINOPLASTY      TONSILLECTOMY      TOTAL HIP ARTHROPLASTY      TOTAL NEPHRECTOMY      atrophic from infections, age 13   Heddie Catena TUBAL LIGATION      UPPER GASTROINTESTINAL ENDOSCOPY N/A 3/17/2020    BEDSIDE EGD ESOPHAGOGASTRODUODENOSCOPY (ICU) performed by Elijah Bass MD at [facility] Medications:      fluconazole  400 mg Intravenous Q24H    gabapentin  300 mg Oral 3 times per day    ipratropium-albuterol  1 ampule Inhalation 4x daily    albuterol  2.5 mg Nebulization BID    fat emulsion  100 mL Intravenous Daily    insulin lispro  0-6 Units Subcutaneous Q6H    midazolam  2 mg Intravenous Once    sucralfate  1 g Oral 4 times per day    meropenem  1 g Intravenous Q8H    pantoprazole  40 mg Intravenous BID    And    sodium chloride (PF)  10 mL Intravenous BID    sodium chloride  20 mL Intravenous Once    metoclopramide  10 mg Intravenous Q6H    milk and molasses  240 mL Rectal Once    bisacodyl  10 mg Rectal Daily    lidocaine 1 % injection  5 mL Intradermal Once    sodium chloride flush  10 mL Intravenous 2 times per day    traZODone  100 mg Oral Nightly    magnesium citrate  296 mL Oral Once    clonazePAM  0.5 mg Oral BID    [Held by provider] amitriptyline  10 mg Oral TID    [Held by provider] amLODIPine  10 mg Oral Nightly    sodium chloride flush  10 mL Intravenous 2 times per day    busPIRone  20 mg Oral TID    [Held by provider] metoprolol succinate  25 mg Oral Nightly    nicotine  1 patch Transdermal Daily    sodium chloride (PF)  10 mL Intravenous Daily    escitalopram  20 mg Oral Daily       Social History:     Social History     Socioeconomic History    Marital status:       Spouse name: Not on file    Number of children: 2    Years of education: Not on file    Highest education level: Not on file   Occupational History    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   Social Needs    Financial resource strain: Not on file    Food insecurity     Worry: Not on file     Inability: Not on file   [employer] needs     Medical: Not on file     Non-medical: Not on file   Tobacco Use    Smoking status: Current Every Day Smoker     Packs/day: 1.00     Years: 50.00     Pack years: 50.00     Types: Cigarettes    Smokeless tobacco: Never Used   Substance and Sexual seen and examined the patient and the key elements of the encounter have been performed by me. I was present when the student obtained his information or examined the patient. I have reviewed the laboratory data, other diagnostic studies and discussed them with the residents. I have updated the medical record where necessary. I agree with the assessment, plan and orders as documented by the resident/ student.     Felix Stearns MD.    3/24/2020 11:23 AM

## 2020-03-24 NOTE — PLAN OF CARE
Problem: OXYGENATION/RESPIRATORY FUNCTION  Goal: Patient will maintain patent airway  3/24/2020 0812 by Jossy Osorio RCP  Outcome: Ongoing  3/24/2020 0509 by Lisa Mg RCP  Outcome: Ongoing  3/23/2020 2243 by Ania Mcgowan RN  Outcome: Ongoing     Problem: OXYGENATION/RESPIRATORY FUNCTION  Goal: Patient will achieve/maintain normal respiratory rate/effort  Description: Respiratory rate and effort will be within normal limits for the patient  3/24/2020 2822 by ZULEYMA CoreaP  Outcome: Ongoing  3/24/2020 0509 by ZULEYMA AyalaP  Outcome: Ongoing  3/23/2020 2243 by Ania Mcgowan RN  Outcome: Ongoing     Problem: MECHANICAL VENTILATION  Goal: Patient will maintain patent airway  3/24/2020 0812 by ZULEYMA CoreaP  Outcome: Ongoing  3/24/2020 0509 by Lisa Mg RCP  Outcome: Ongoing  3/23/2020 2243 by Ania Mcgowan RN  Outcome: Ongoing     Problem: MECHANICAL VENTILATION  Goal: Oral health is maintained or improved  3/24/2020 0812 by Jossy Osorio RCP  Outcome: Ongoing  3/24/2020 0509 by Lisa Mg RCP  Outcome: Ongoing  3/23/2020 2243 by Ania Mcgowan RN  Outcome: Ongoing     Problem: MECHANICAL VENTILATION  Goal: ET tube will be managed safely  3/24/2020 0812 by Jossy Osorio RCP  Outcome: Ongoing  3/24/2020 0509 by Lisa Mg RCP  Outcome: Ongoing  3/23/2020 2243 by Ania Mcgowan RN  Outcome: Ongoing     Problem: MECHANICAL VENTILATION  Goal: Ability to express needs and understand communication  3/24/2020 0812 by Jossy Osorio RCP  Outcome: Ongoing     Problem: MECHANICAL VENTILATION  Goal: Ability to express needs and understand communication  3/24/2020 0812 by ZULEYMA CoreaP  Outcome: Ongoing  3/24/2020 0509 by Lisa Mg RCP  Outcome: Ongoing  3/23/2020 2243 by Ania Mcgowan RN  Outcome: Ongoing     Problem: MECHANICAL VENTILATION  Goal: Mobility/activity is maintained at optimum level for patient  3/24/2020 9420 by ZULEYMA CoreaP  Outcome: Ongoing  3/24/2020 0509 by Alex Justice RCP  Outcome: Ongoing  3/23/2020 2243 by Princess Remington RN  Outcome: Ongoing     Problem: SKIN INTEGRITY  Goal: Skin integrity is maintained or improved  3/24/2020 0812 by Arabella Cardona RCP  Outcome: Ongoing  3/24/2020 0509 by Alex Justice RCP  Outcome: Ongoing  3/23/2020 2243 by Princess Macedo RN  Outcome: Ongoing

## 2020-03-24 NOTE — PLAN OF CARE
PROVIDE ADEQUATE OXYGENATION WITH ACCEPTABLE SP02/ABG'S    [x]  IDENTIFY APPROPRIATE OXYGEN THERAPY  [x]   MONITOR SP02/ABG'S AS NEEDED   [x]   PATIENT EDUCATION AS NEEDED        BRONCHOSPASM/BRONCHOCONSTRICTION     [x]         IMPROVE AERATION/BREATH SOUNDS  [x]   ADMINISTER BRONCHODILATOR THERAPY AS APPROPRIATE  [x]   ASSESS BREATH SOUNDS  []   IMPLEMENT AEROSOL/MDI PROTOCOL  [x]   PATIENT EDUCATION AS NEEDED    MECHANICAL VENTILATION     [x]   PROVIDE OPTIMAL VENTILATION  [x]   ASSESS FOR EXTUBATION READINESS  [x]   ASSESS FOR WEANING READINESS  [x]  EXTUBATE AS TOLERATED  [x]  IMPLEMENT ADULT MECHANICAL VENTILATION PROTOCOL  [x]  MAINTAIN ADEQUATE OXYGENATION  [x]  PERFORM SPONTANEOUS WEANING TRIAL AS TOLERATED    Assessment for weaning readiness    PRE-TRIAL PATIENT ASSESSMENT - COMPLETED AT 0800    Completed by:  Arabella Cardona  8:08 AM    PARAMETER CRITERIA FOR WEANING CRITERIA FOR WEANING   Diaphoresis, agitation or dyspnea None present No   Heart Rate    Pulse: 85 Pulse less than 50 or greater than 140   No   Blood Pressure  BP: (!) 935/50 Systolic Blood Pressure less than 90 mmHg No   Vaspressors Vasopressors greater than or equal to 5mcg No   SpO2: 99 %  FiO2 : 45 %     SpO2 less than 90% and or   FiO2 is greater than 50%  Peep is greater than 8   No   P/F ratio: 191  :No results found for: PHART, EWT3VQH, PO2ART, L5QJGFJF, UDN0WEW, BEART   PaO2/FiO2 less than or equal to 150 No   Sedation Patient in unable to follow simple commands on a continuous infusion of Midazolam, Ativan, Diprivan, or other hypnosedatives with the exception of PCA morphine, Fentanyl and Dilaudid with a basal rate Yes     Pt is currently on the ARDS protocol at this time, pt is not a ventilator wean candidate at this time.

## 2020-03-24 NOTE — PLAN OF CARE
pain  Description: Control of acute pain  3/23/2020 2243 by Ganesh Culver RN  Outcome: Ongoing  3/23/2020 1221 by Arnaldo Ledesma RN  Outcome: Ongoing  Goal: Control of chronic pain  Description: Control of chronic pain  Outcome: Ongoing     Problem: Skin Integrity - Impaired:  Goal: Will show no infection signs and symptoms  Description: Will show no infection signs and symptoms  3/23/2020 2243 by Ganesh Culver RN  Outcome: Ongoing  3/23/2020 1221 by Arnaldo Ledesma RN  Outcome: Ongoing  Goal: Absence of new skin breakdown  Description: Absence of new skin breakdown  Outcome: Ongoing     Problem: Sleep Pattern Disturbance:  Goal: Appears well-rested  Description: Appears well-rested  3/23/2020 2243 by Ganesh Culver RN  Outcome: Ongoing  3/23/2020 1221 by Arnaldo Ledesma RN  Outcome: Ongoing     Problem: Risk for Impaired Skin Integrity  Goal: Tissue integrity - skin and mucous membranes  Description: Structural intactness and normal physiological function of skin and  mucous membranes.   3/23/2020 2243 by Ganesh Culver RN  Outcome: Ongoing  3/23/2020 1221 by Arnaldo Ledesma RN  Outcome: Ongoing     Problem: Falls - Risk of:  Goal: Will remain free from falls  Description: Will remain free from falls  Outcome: Ongoing  Goal: Absence of physical injury  Description: Absence of physical injury  Outcome: Ongoing     Problem: OXYGENATION/RESPIRATORY FUNCTION  Goal: Patient will maintain patent airway  Outcome: Ongoing  Goal: Patient will achieve/maintain normal respiratory rate/effort  Description: Respiratory rate and effort will be within normal limits for the patient  Outcome: Ongoing     Problem: MECHANICAL VENTILATION  Goal: Patient will maintain patent airway  3/23/2020 2243 by Ganesh Culver RN  Outcome: Ongoing  3/23/2020 1221 by Arnaldo Ledesma RN  Outcome: Ongoing  Goal: Oral health is maintained or improved  3/23/2020 2243 by Ganesh Culver RN  Outcome: Ongoing  3/23/2020 1221 by Cheri

## 2020-03-25 LAB
ABSOLUTE EOS #: 0 K/UL (ref 0–0.4)
ABSOLUTE IMMATURE GRANULOCYTE: 0.3 K/UL (ref 0–0.3)
ABSOLUTE LYMPH #: 1.39 K/UL (ref 1–4.8)
ABSOLUTE MONO #: 0.4 K/UL (ref 0.1–0.8)
ALLEN TEST: ABNORMAL
ANION GAP SERPL CALCULATED.3IONS-SCNC: 10 MMOL/L (ref 9–17)
BASOPHILS # BLD: 0 % (ref 0–2)
BASOPHILS ABSOLUTE: 0 K/UL (ref 0–0.2)
BUN BLDV-MCNC: 17 MG/DL (ref 8–23)
BUN/CREAT BLD: ABNORMAL (ref 9–20)
CALCIUM SERPL-MCNC: 8.7 MG/DL (ref 8.6–10.4)
CHLORIDE BLD-SCNC: 98 MMOL/L (ref 98–107)
CO2: 31 MMOL/L (ref 20–31)
CREAT SERPL-MCNC: 0.46 MG/DL (ref 0.5–0.9)
DIFFERENTIAL TYPE: ABNORMAL
EOSINOPHILS RELATIVE PERCENT: 0 % (ref 1–4)
FIO2: 45
GFR AFRICAN AMERICAN: >60 ML/MIN
GFR NON-AFRICAN AMERICAN: >60 ML/MIN
GFR SERPL CREATININE-BSD FRML MDRD: ABNORMAL ML/MIN/{1.73_M2}
GFR SERPL CREATININE-BSD FRML MDRD: ABNORMAL ML/MIN/{1.73_M2}
GLUCOSE BLD-MCNC: 112 MG/DL (ref 65–105)
GLUCOSE BLD-MCNC: 115 MG/DL (ref 65–105)
GLUCOSE BLD-MCNC: 130 MG/DL (ref 70–99)
GLUCOSE BLD-MCNC: 143 MG/DL (ref 74–100)
HCT VFR BLD CALC: 29.5 % (ref 36.3–47.1)
HEMOGLOBIN: 9.1 G/DL (ref 11.9–15.1)
IMMATURE GRANULOCYTES: 3 %
LYMPHOCYTES # BLD: 14 % (ref 24–44)
MAGNESIUM: 1.9 MG/DL (ref 1.6–2.6)
MCH RBC QN AUTO: 30 PG (ref 25.2–33.5)
MCHC RBC AUTO-ENTMCNC: 30.8 G/DL (ref 28.4–34.8)
MCV RBC AUTO: 97.4 FL (ref 82.6–102.9)
MODE: ABNORMAL
MONOCYTES # BLD: 4 % (ref 1–7)
MORPHOLOGY: ABNORMAL
NEGATIVE BASE EXCESS, ART: ABNORMAL (ref 0–2)
NRBC AUTOMATED: 0.2 PER 100 WBC
NUCLEATED RED BLOOD CELLS: 2 PER 100 WBC
O2 DEVICE/FLOW/%: ABNORMAL
PATIENT TEMP: 37.4
PDW BLD-RTO: 15.4 % (ref 11.8–14.4)
PHOSPHORUS: 3.2 MG/DL (ref 2.6–4.5)
PLATELET # BLD: 213 K/UL (ref 138–453)
PLATELET ESTIMATE: ABNORMAL
PMV BLD AUTO: 10.6 FL (ref 8.1–13.5)
POC HCO3: 33.6 MMOL/L (ref 21–28)
POC O2 SATURATION: 99 % (ref 94–98)
POC PCO2 TEMP: 48 MM HG
POC PCO2: 47.2 MM HG (ref 35–48)
POC PH TEMP: 7.46
POC PH: 7.46 (ref 7.35–7.45)
POC PO2 TEMP: 123 MM HG
POC PO2: 120.8 MM HG (ref 83–108)
POSITIVE BASE EXCESS, ART: 9 (ref 0–3)
POTASSIUM SERPL-SCNC: 4.8 MMOL/L (ref 3.7–5.3)
RBC # BLD: 3.03 M/UL (ref 3.95–5.11)
RBC # BLD: ABNORMAL 10*6/UL
SAMPLE SITE: ABNORMAL
SEG NEUTROPHILS: 79 % (ref 36–66)
SEGMENTED NEUTROPHILS ABSOLUTE COUNT: 7.81 K/UL (ref 1.8–7.7)
SODIUM BLD-SCNC: 139 MMOL/L (ref 135–144)
TCO2 (CALC), ART: 35 MMOL/L (ref 22–29)
WBC # BLD: 9.9 K/UL (ref 3.5–11.3)
WBC # BLD: ABNORMAL 10*3/UL

## 2020-03-25 PROCEDURE — 82947 ASSAY GLUCOSE BLOOD QUANT: CPT

## 2020-03-25 PROCEDURE — 82803 BLOOD GASES ANY COMBINATION: CPT

## 2020-03-25 PROCEDURE — 99291 CRITICAL CARE FIRST HOUR: CPT | Performed by: INTERNAL MEDICINE

## 2020-03-25 PROCEDURE — 2000000000 HC ICU R&B

## 2020-03-25 PROCEDURE — 84100 ASSAY OF PHOSPHORUS: CPT

## 2020-03-25 PROCEDURE — 6370000000 HC RX 637 (ALT 250 FOR IP): Performed by: STUDENT IN AN ORGANIZED HEALTH CARE EDUCATION/TRAINING PROGRAM

## 2020-03-25 PROCEDURE — 2700000000 HC OXYGEN THERAPY PER DAY

## 2020-03-25 PROCEDURE — 6360000002 HC RX W HCPCS: Performed by: INTERNAL MEDICINE

## 2020-03-25 PROCEDURE — 6360000002 HC RX W HCPCS: Performed by: EMERGENCY MEDICINE

## 2020-03-25 PROCEDURE — 99233 SBSQ HOSP IP/OBS HIGH 50: CPT | Performed by: INTERNAL MEDICINE

## 2020-03-25 PROCEDURE — C9113 INJ PANTOPRAZOLE SODIUM, VIA: HCPCS | Performed by: STUDENT IN AN ORGANIZED HEALTH CARE EDUCATION/TRAINING PROGRAM

## 2020-03-25 PROCEDURE — 37799 UNLISTED PX VASCULAR SURGERY: CPT

## 2020-03-25 PROCEDURE — 2580000003 HC RX 258: Performed by: STUDENT IN AN ORGANIZED HEALTH CARE EDUCATION/TRAINING PROGRAM

## 2020-03-25 PROCEDURE — 94770 HC ETCO2 MONITOR DAILY: CPT

## 2020-03-25 PROCEDURE — 6360000002 HC RX W HCPCS: Performed by: STUDENT IN AN ORGANIZED HEALTH CARE EDUCATION/TRAINING PROGRAM

## 2020-03-25 PROCEDURE — 2500000003 HC RX 250 WO HCPCS: Performed by: STUDENT IN AN ORGANIZED HEALTH CARE EDUCATION/TRAINING PROGRAM

## 2020-03-25 PROCEDURE — 83735 ASSAY OF MAGNESIUM: CPT

## 2020-03-25 PROCEDURE — 85025 COMPLETE CBC W/AUTO DIFF WBC: CPT

## 2020-03-25 PROCEDURE — 6370000000 HC RX 637 (ALT 250 FOR IP): Performed by: INTERNAL MEDICINE

## 2020-03-25 PROCEDURE — 97110 THERAPEUTIC EXERCISES: CPT

## 2020-03-25 PROCEDURE — 94761 N-INVAS EAR/PLS OXIMETRY MLT: CPT

## 2020-03-25 PROCEDURE — 94640 AIRWAY INHALATION TREATMENT: CPT

## 2020-03-25 PROCEDURE — 94003 VENT MGMT INPAT SUBQ DAY: CPT

## 2020-03-25 PROCEDURE — 80048 BASIC METABOLIC PNL TOTAL CA: CPT

## 2020-03-25 RX ORDER — FUROSEMIDE 10 MG/ML
20 INJECTION INTRAMUSCULAR; INTRAVENOUS ONCE
Status: COMPLETED | OUTPATIENT
Start: 2020-03-25 | End: 2020-03-25

## 2020-03-25 RX ADMIN — IPRATROPIUM BROMIDE AND ALBUTEROL SULFATE 1 AMPULE: .5; 3 SOLUTION RESPIRATORY (INHALATION) at 13:08

## 2020-03-25 RX ADMIN — Medication 100 MCG/HR: at 11:42

## 2020-03-25 RX ADMIN — Medication 12 MCG/MIN: at 02:23

## 2020-03-25 RX ADMIN — BUSPIRONE HYDROCHLORIDE 20 MG: 10 TABLET ORAL at 08:32

## 2020-03-25 RX ADMIN — GABAPENTIN 300 MG: 250 SOLUTION ORAL at 14:49

## 2020-03-25 RX ADMIN — SODIUM CHLORIDE, PRESERVATIVE FREE 10 ML: 5 INJECTION INTRAVENOUS at 08:32

## 2020-03-25 RX ADMIN — SUCRALFATE 1 G: 1 TABLET ORAL at 05:38

## 2020-03-25 RX ADMIN — METOCLOPRAMIDE 10 MG: 5 INJECTION, SOLUTION INTRAMUSCULAR; INTRAVENOUS at 12:41

## 2020-03-25 RX ADMIN — Medication 10 ML: at 22:03

## 2020-03-25 RX ADMIN — SUCRALFATE 1 G: 1 TABLET ORAL at 17:55

## 2020-03-25 RX ADMIN — FUROSEMIDE 20 MG: 10 INJECTION, SOLUTION INTRAMUSCULAR; INTRAVENOUS at 15:21

## 2020-03-25 RX ADMIN — PANTOPRAZOLE SODIUM 40 MG: 40 INJECTION, POWDER, FOR SOLUTION INTRAVENOUS at 08:32

## 2020-03-25 RX ADMIN — INSULIN LISPRO 1 UNITS: 100 INJECTION, SOLUTION INTRAVENOUS; SUBCUTANEOUS at 05:02

## 2020-03-25 RX ADMIN — MEROPENEM 1 G: 1 INJECTION, POWDER, FOR SOLUTION INTRAVENOUS at 22:02

## 2020-03-25 RX ADMIN — IPRATROPIUM BROMIDE AND ALBUTEROL SULFATE 1 AMPULE: .5; 3 SOLUTION RESPIRATORY (INHALATION) at 02:04

## 2020-03-25 RX ADMIN — BUSPIRONE HYDROCHLORIDE 20 MG: 10 TABLET ORAL at 14:49

## 2020-03-25 RX ADMIN — Medication 10 ML: at 08:32

## 2020-03-25 RX ADMIN — MEROPENEM 1 G: 1 INJECTION, POWDER, FOR SOLUTION INTRAVENOUS at 12:40

## 2020-03-25 RX ADMIN — POLYETHYLENE GLYCOL 3350 17 G: 17 POWDER, FOR SOLUTION ORAL at 08:41

## 2020-03-25 RX ADMIN — SODIUM CHLORIDE, PRESERVATIVE FREE 10 ML: 5 INJECTION INTRAVENOUS at 22:05

## 2020-03-25 RX ADMIN — IPRATROPIUM BROMIDE AND ALBUTEROL SULFATE 1 AMPULE: .5; 3 SOLUTION RESPIRATORY (INHALATION) at 19:30

## 2020-03-25 RX ADMIN — GABAPENTIN 300 MG: 250 SOLUTION ORAL at 05:38

## 2020-03-25 RX ADMIN — BISACODYL 10 MG: 10 SUPPOSITORY RECTAL at 08:32

## 2020-03-25 RX ADMIN — ACETAMINOPHEN 650 MG: 325 TABLET ORAL at 22:02

## 2020-03-25 RX ADMIN — GABAPENTIN 300 MG: 250 SOLUTION ORAL at 22:02

## 2020-03-25 RX ADMIN — SODIUM CHLORIDE, PRESERVATIVE FREE 10 ML: 5 INJECTION INTRAVENOUS at 22:04

## 2020-03-25 RX ADMIN — Medication 100 MCG/HR: at 01:52

## 2020-03-25 RX ADMIN — SODIUM CHLORIDE: 9 INJECTION, SOLUTION INTRAVENOUS at 21:59

## 2020-03-25 RX ADMIN — FLUCONAZOLE 400 MG: 2 INJECTION, SOLUTION INTRAVENOUS at 10:52

## 2020-03-25 RX ADMIN — CLONAZEPAM 0.5 MG: 0.5 TABLET ORAL at 22:02

## 2020-03-25 RX ADMIN — ACETAMINOPHEN 650 MG: 325 TABLET ORAL at 08:41

## 2020-03-25 RX ADMIN — I.V. FAT EMULSION 100 ML: 20 EMULSION INTRAVENOUS at 19:57

## 2020-03-25 RX ADMIN — ESCITALOPRAM OXALATE 20 MG: 10 TABLET ORAL at 08:32

## 2020-03-25 RX ADMIN — METOCLOPRAMIDE 10 MG: 5 INJECTION, SOLUTION INTRAMUSCULAR; INTRAVENOUS at 05:38

## 2020-03-25 RX ADMIN — Medication 6 MG/HR: at 01:04

## 2020-03-25 RX ADMIN — CLONAZEPAM 0.5 MG: 0.5 TABLET ORAL at 08:41

## 2020-03-25 RX ADMIN — TRAZODONE HYDROCHLORIDE 100 MG: 100 TABLET ORAL at 22:02

## 2020-03-25 RX ADMIN — METOCLOPRAMIDE 10 MG: 5 INJECTION, SOLUTION INTRAMUSCULAR; INTRAVENOUS at 17:55

## 2020-03-25 RX ADMIN — ACETAMINOPHEN 650 MG: 325 TABLET ORAL at 14:49

## 2020-03-25 RX ADMIN — POTASSIUM CHLORIDE: 2 INJECTION, SOLUTION, CONCENTRATE INTRAVENOUS at 17:50

## 2020-03-25 RX ADMIN — Medication 100 MCG/HR: at 21:54

## 2020-03-25 RX ADMIN — MEROPENEM 1 G: 1 INJECTION, POWDER, FOR SOLUTION INTRAVENOUS at 05:00

## 2020-03-25 RX ADMIN — BUSPIRONE HYDROCHLORIDE 20 MG: 10 TABLET ORAL at 22:02

## 2020-03-25 RX ADMIN — PANTOPRAZOLE SODIUM 40 MG: 40 INJECTION, POWDER, FOR SOLUTION INTRAVENOUS at 22:02

## 2020-03-25 RX ADMIN — SUCRALFATE 1 G: 1 TABLET ORAL at 12:41

## 2020-03-25 RX ADMIN — MAGNESIUM HYDROXIDE 30 ML: 400 SUSPENSION ORAL at 08:41

## 2020-03-25 RX ADMIN — Medication 6 MG/HR: at 14:56

## 2020-03-25 RX ADMIN — IPRATROPIUM BROMIDE AND ALBUTEROL SULFATE 1 AMPULE: .5; 3 SOLUTION RESPIRATORY (INHALATION) at 08:32

## 2020-03-25 ASSESSMENT — PULMONARY FUNCTION TESTS
PIF_VALUE: 26
PIF_VALUE: 24
PIF_VALUE: 26
PIF_VALUE: 25
PIF_VALUE: 24
PIF_VALUE: 20
PIF_VALUE: 24
PIF_VALUE: 12
PIF_VALUE: 30
PIF_VALUE: 39
PIF_VALUE: 38
PIF_VALUE: 17
PIF_VALUE: 26
PIF_VALUE: 17
PIF_VALUE: 14
PIF_VALUE: 24
PIF_VALUE: 20
PIF_VALUE: 24
PIF_VALUE: 19
PIF_VALUE: 19
PIF_VALUE: 24
PIF_VALUE: 16
PIF_VALUE: 14
PIF_VALUE: 37
PIF_VALUE: 22
PIF_VALUE: 24
PIF_VALUE: 26
PIF_VALUE: 30
PIF_VALUE: 18
PIF_VALUE: 16
PIF_VALUE: 27
PIF_VALUE: 15
PIF_VALUE: 37
PIF_VALUE: 25
PIF_VALUE: 24
PIF_VALUE: 39
PIF_VALUE: 17
PIF_VALUE: 27
PIF_VALUE: 25
PIF_VALUE: 23
PIF_VALUE: 9
PIF_VALUE: 40
PIF_VALUE: 36
PIF_VALUE: 25
PIF_VALUE: 40
PIF_VALUE: 27
PIF_VALUE: 20
PIF_VALUE: 11
PIF_VALUE: 23
PIF_VALUE: 14
PIF_VALUE: 24
PIF_VALUE: 40
PIF_VALUE: 24

## 2020-03-25 ASSESSMENT — PAIN SCALES - GENERAL
PAINLEVEL_OUTOF10: 0

## 2020-03-25 NOTE — PLAN OF CARE
Problem: OXYGENATION/RESPIRATORY FUNCTION  Goal: Patient will maintain patent airway  Outcome: Ongoing     Problem: OXYGENATION/RESPIRATORY FUNCTION  Goal: Patient will achieve/maintain normal respiratory rate/effort  Description: Respiratory rate and effort will be within normal limits for the patient  Outcome: Ongoing     Problem: MECHANICAL VENTILATION  Goal: Patient will maintain patent airway  Outcome: Ongoing     Problem: MECHANICAL VENTILATION  Goal: Oral health is maintained or improved  Outcome: Ongoing     Problem: MECHANICAL VENTILATION  Goal: ET tube will be managed safely  Outcome: Ongoing     Problem: MECHANICAL VENTILATION  Goal: Ability to express needs and understand communication  Outcome: Ongoing     Problem: RESPIRATORY  Intervention: Respiratory assessment  Note: BRONCHOSPASM/BRONCHOCONSTRICTION     [x]         IMPROVE AERATION/BREATH SOUNDS  [x]   ADMINISTER BRONCHODILATOR THERAPY AS APPROPRIATE  [x]   ASSESS BREATH SOUNDS  [x]   IMPLEMENT AEROSOL/MDI PROTOCOL  [x]   PATIENT EDUCATION AS NEEDED   PROVIDE ADEQUATE OXYGENATION WITH ACCEPTABLE SP02/ABG'S    [x]  IDENTIFY APPROPRIATE OXYGEN THERAPY  [x]   MONITOR SP02/ABG'S AS NEEDED   [x]   PATIENT EDUCATION AS NEEDED   MECHANICAL VENTILATION     [x]  PROVIDE OPTIMAL VENTILATION  [x]   ASSESS FOR EXTUBATION READINESS  [x]   ASSESS FOR WEANING READINESS  [x]  EXTUBATE AS TOLERATED  [x]  IMPLEMENT ADULT MECHANICAL VENTILATION PROTOCOL  [x]  MAINTAIN ADEQUATE OXYGENATION  [x]  PERFORM SPONTANEOUS WEANING TRIAL AS TOLERATED

## 2020-03-25 NOTE — PLAN OF CARE
Problem: OXYGENATION/RESPIRATORY FUNCTION  Goal: Patient will maintain patent airway  3/24/2020 2213 by Sandra Mir RCP  Outcome: Ongoing     Problem: OXYGENATION/RESPIRATORY FUNCTION  Goal: Patient will achieve/maintain normal respiratory rate/effort  Description: Respiratory rate and effort will be within normal limits for the patient  3/24/2020 2213 by Sandra Mir RCP  Outcome: Ongoing     Problem: MECHANICAL VENTILATION  Goal: Patient will maintain patent airway  3/25/2020 0908 by Morgan Kruger RCP  Outcome: Ongoing  3/24/2020 2213 by Sandra Mir RCP  Outcome: Ongoing     Problem: MECHANICAL VENTILATION  Goal: Oral health is maintained or improved  3/25/2020 0908 by Morgan Kruger RCP  Outcome: Ongoing  3/24/2020 2213 by Sandra Mir RCP  Outcome: Ongoing     Problem: MECHANICAL VENTILATION  Goal: ET tube will be managed safely  3/25/2020 0908 by Morgan Kruger RCP  Outcome: Ongoing  3/24/2020 2213 by Sandra Mir RCP  Outcome: Ongoing     Problem: MECHANICAL VENTILATION  Goal: Ability to express needs and understand communication  3/25/2020 0908 by Morgan Kruger RCP  Outcome: Ongoing  3/24/2020 2213 by Sandra Mir RCP  Outcome: Ongoing     Problem: MECHANICAL VENTILATION  Goal: Mobility/activity is maintained at optimum level for patient  Outcome: Ongoing

## 2020-03-25 NOTE — PLAN OF CARE
Problem: OXYGENATION/RESPIRATORY FUNCTION  Goal: Patient will maintain patent airway  Outcome: Ongoing     Problem: OXYGENATION/RESPIRATORY FUNCTION  Goal: Patient will achieve/maintain normal respiratory rate/effort  Description: Respiratory rate and effort will be within normal limits for the patient  Outcome: Ongoing     Problem: MECHANICAL VENTILATION  Goal: Patient will maintain patent airway  3/25/2020 1927 by Tod Santo RCP  Outcome: Ongoing     Problem: MECHANICAL VENTILATION  Goal: Oral health is maintained or improved  3/25/2020 1927 by Tod Santo RCP  Outcome: Ongoing     Problem: MECHANICAL VENTILATION  Goal: ET tube will be managed safely  3/25/2020 1927 by Tod Santo RCP  Outcome: Ongoing     Problem: MECHANICAL VENTILATION  Goal: Ability to express needs and understand communication  3/25/2020 1927 by Tod Santo RCP  Outcome: Ongoing     Problem: MECHANICAL VENTILATION  Goal: Mobility/activity is maintained at optimum level for patient  3/25/2020 1927 by Tod Santo RCP  Outcome: Ongoing     Problem: SKIN INTEGRITY  Goal: Skin integrity is maintained or improved  Outcome: Ongoing

## 2020-03-25 NOTE — PROGRESS NOTES
kg)  · Admission Body Wt: 197 lb 12 oz (89.7 kg)  · Usual Body Wt: (186 lb on 2/10/20)  · Ideal Body Wt: 110 lb (49.9 kg), % Ideal Body 179% (adm/ideal)   · BMI Classification: BMI 35.0 - 39.9 Obese Class II    Nutrition Interventions:   Continue Parenteral Nutrition  Continued Inpatient Monitoring, Education Not Indicated    Nutrition Evaluation:   · Evaluation: Goal achieved   · Goals: meet % of estimated nutrition needs    · Monitoring: PN Intake, PN Tolerance, Pertinent Labs, Wound Healing      Electronically signed by Tano Shafer RD, LD on 3/25/20 at 11:01 AM EDT    Contact Number: [phone]

## 2020-03-25 NOTE — PROGRESS NOTES
[facility] -Progress Note    Today's Date and Time: 3/25/2020, 1:37 PM    Impression :     · Acute respiratory failure s/p intubation 3-17  · S/p EGD 3-17  · PUD  · GERD  · S/P hiatal hernia repair and Deshaun semi fundoplication [id number]  · Urine culture growing Candida  · Pleural effusion  · Acute blood loss anemia  · BENEDICT    Recommendations:   · Continue IV meropenem day 9  · Continue Diflucan 400 mg po q day x 7 days stop date 3-26  · PEG tube placement at some point as she is unable to tolerate tube feeds. · Supportive care  · Monitor clinical progression  · Low probability of COVID, no need to test at this time    Medical Decision Making/Summary/Discussion:3/25/2020     ·   Infection Control Recommendations   · [place] Precautions    Antimicrobial Stewardship Recommendations     · Simplification of therapy  · Targeted therapy    Coordination of Outpatient Care:   · Estimated Length of IV antimicrobials:TBD  · Patient will need Midline Catheter Insertion: TBD  · Patient will need PICC line Insertion:  · Patient will need: Home IV , [employer],  SNF,  LTAC:TBD  · Patient will need outpatient wound care:TBD    Chief complaint/reason for consultation:    High grade fever    History of Present Illness:   Nelson Reilly is a 76y.o.-year-old  female who was initially admitted on 2/29/2020. Patient seen at the request of Dr. Scott Angeles:    Patient has a history of severe GERD despite being on PPI, H2 blocker and Tums. She underwent laparoscopic robotic hiatal hernia repair and fundoplication last month on 02/24/2020 and postoperatively she developed hypoxia. CT chest done on 02/26/2020 showed bibasilar atelectasis/consolidation and patchy area of infiltrate in the right upper lobe. Patient was discharged on home oxygen on 2-28.      She presented back to [facility] ER on 2-29 with worsening shortness of breath, dyspnea as well as an episode of dark red emesis. Her SaO2 was 60 % in the ED. There was concern of aspiration. CTA chest done was negative for PE, showed bilateral pleural effusions and severe esophageal dilatation, surgery was consulted. Patient was transferred to [facility] for further management. Patient was started on noninvasive ventilation and then intubated and admitted to the ICU. She improved, was extubated on 3-7 to high flow O2. Pt was transferred out of the ICU on 3-13. On 3-14 pts hgb dropped to 6.3 and she developed coffee-ground then bright red emesis, and was again transferred back to medical ICU. General surgery performed endoscopy on 3/17 which showed an ulcer at the GE junction and mild gastritis with no active bleeding. After the procedure she developed acute respiratory distress and a fever of 39.8. She was emergently intubated and started on Meropenem and Vancomycin. ID is consulted for antibiotic management. Patient was continued on meropenem. Vancomycin D/C    CURRENT EXAMINATION: 3/25/2020     Pt seen and examined in the ICU. She is intubated. On fentanyl and versed  On Levophed intermittently    Sedation increased for improved vent complaince  ARDS protocol initiated    Afebrile  VS stable, intermittent hypotension    Has NG tube in place to LIS  Constipated. On reglan and dulcolax, has had multiple enemas without BM  On TPN. Patient could not tolerate tube feeds . Planning to place PEG tube once more stable. Chest x-rays (3-18-->3-21-20) show bilateral infiltrates   CXR 3-22-20: Shows worsening of bilateral infiltrates and new RLL opacity  CXR 3-23  Stable multifocal opacities accounting for positional differences  CXR 3-24: Improvement in diffuse interstitial opacities    HRR - tachycardic  Abd soft, quiet bowel sounds.     Labs, X rays reviewed: 3/25/2020    BUN: 16->15->14->17  Cr: 0.64->0.55-->0.43->0.38-->0.46    WBC: 10.2--> 13.9-->8.9-> 9.7-> 7.4-->6.9->7.0-> 6.5-->9.9  Hb: 7.7-->7.3-> 8.2-> Substance and Sexual Activity    Alcohol use: No    Drug use: No    Sexual activity: Never   Lifestyle    Physical activity     Days per week: Not on file     Minutes per session: Not on file    Stress: Not on file   Relationships    Social connections     Talks on phone: Not on file     Gets together: Not on file     Attends [employer] service: Not on file     Active member of club or organization: Not on file     Attends meetings of clubs or organizations: Not on file     Relationship status: Not on file    Intimate partner violence     Fear of current or ex partner: Not on file     Emotionally abused: Not on file     Physically abused: Not on file     Forced sexual activity: Not on file   Other Topics Concern    Not on file   Social History Narrative    Not on file       Family History:     Family History   Problem Relation Age of Onset    Cancer Mother         uterine    Heart Attack Mother     Heart Attack Father     Other Maternal Grandfather         foot gangrene    Other Paternal Grandmother         bleeding ulcers    Other Paternal Grandfather         lung cancer        Allergies:   Compazine [prochlorperazine maleate]; Penicillin v potassium; and Penicillins     Review of Systems:     3/25/2020 UTO, pt intubated, sedated. Constitutional: No fevers or chills. No systemic complaints  Head: No headaches  Eyes: No double vision or blurry vision. No conjunctival inflammation. ENT: No sore throat or runny nose. . No hearing loss, tinnitus or vertigo. Cardiovascular: No chest pain or palpitations. No shortness of breath. No HOLLAND  Lung: No shortness of breath or cough. No sputum production  Abdomen: No nausea, vomiting, diarrhea, or abdominal pain. Erven Hurl No cramps. Genitourinary: No increased urinary frequency, or dysuria. No hematuria. No suprapubic or CVA pain  Musculoskeletal: No muscle aches or pains. No joint effusions, swelling or deformities  Hematologic: No bleeding or bruising.   Neurologic: No headache, weakness, numbness, or tingling. Integument: No rash, no ulcers. Psychiatric: No depression. Endocrine: No polyuria, no polydipsia, no polyphagia. Physical Examination :     Patient Vitals for the past 8 hrs:   BP Temp Temp src Pulse Resp SpO2   03/25/20 1300 -- -- -- 113 (!) 36 90 %   03/25/20 1205 -- -- -- 103 -- --   03/25/20 1200 -- 99.9 °F (37.7 °C) Bladder 104 (!) 31 95 %   03/25/20 1141 -- -- -- 80 28 95 %   03/25/20 1100 -- 99.9 °F (37.7 °C) Bladder 82 23 95 %   03/25/20 1000 -- 99.9 °F (37.7 °C) Bladder 83 22 94 %   03/25/20 0945 -- -- -- 89 24 93 %   03/25/20 0930 -- -- -- 96 25 93 %   03/25/20 0926 -- -- -- 103 25 92 %   03/25/20 0915 -- -- -- 100 23 92 %   03/25/20 0900 -- 100 °F (37.8 °C) Bladder 100 21 94 %   03/25/20 0845 -- 100 °F (37.8 °C) Bladder 100 22 97 %   03/25/20 0837 -- -- -- 82 26 99 %   03/25/20 0830 -- -- -- 76 28 98 %   03/25/20 0815 -- -- -- 76 28 98 %   03/25/20 0801 -- 100.2 °F (37.9 °C) Oral -- -- --   03/25/20 0800 -- -- -- 76 28 98 %   03/25/20 0750 -- -- -- 76 -- --   03/25/20 0700 (!) 101/43 -- -- 75 28 98 %   03/25/20 0645 -- -- -- 75 28 99 %   03/25/20 0630 -- -- -- 76 29 99 %   03/25/20 0615 -- -- -- 83 28 98 %   03/25/20 0600 (!) 116/54 -- -- 75 30 99 %   03/25/20 0545 -- -- -- 76 30 99 %     General Appearance: Sedated, intubated,   Head:  Normocephalic, no trauma  Eyes: Pupils equal, round, reactive to light   Neck:Supple, without lymphadenopathy. Thyroid normal, No bruits. Pulmonary/Chest:  Coarse breath sounds/ crackles appreciated diffusely   Cardiovascular: Regular tachycardic, rate and rhythm without murmurs, rubs, or gallops. Abdomen: Soft, non tender. Bowel sounds very quiet. No organomegaly  All four Extremities:Generalized edema, bilateral LE 2+ edema  Neurologic: Intubated and sedated  Skin: Warm and dry with good turgor. No signs of peripheral arterial or venous insufficiency. No ulcerations. No open wounds.  Abd incisions clean    Medical Detected   Coronavirus NL63 PCR Latest Ref Range: Not Detected  Not Detected   Coronavirus OC Latest Ref Range: Not Detected  Not Detected   Influenza A by PCR Latest Ref Range: Not Detected  Not Detected   Influenza A H1 (2009) PCR Latest Ref Range: Not Detected  NOT REPORTED   Influenza A H3 PCR Latest Ref Range: Not Detected  NOT REPORTED   Influenza B by PCR Latest Ref Range: Not Detected  Not Detected   Parainfluenza 1 PCR Latest Ref Range: Not Detected  Not Detected   Parainfluenza 2 PCR Latest Ref Range: Not Detected  Not Detected   Parainfluenza 3 PCR Latest Ref Range: Not Detected  Not Detected   Parainfluenza 4 PCR Latest Ref Range: Not Detected  Not Detected   Resp Syncytial Virus PCR Latest Ref Range: Not Detected  Not Detected   Mycoplasma pneumo by PCR Latest Ref Range: Not Detected  Not Detected       3/19/2020  7:50 AM - Ashish Adams Incoming Lab Results From [employer]     Specimen Information: Urine        Component Collected Lab   Specimen Description 03/17/2020  2:47  [address]   . URINE    Special Requests 03/17/2020  2:47  [address]   NOT REPORTED    Culture Abnormal  03/17/2020  2:47  [address]   PRESUMPTIVE FLORA ALBICANS >[id number] CFU/ML    Testing Performed By       Medical Decision Making-Other:     Note:  · Labs, medications, radiologic studies were reviewed with personal review of films  · Moderate Large amounts of data were reviewed  · Discussed with nursing Staff, Discharge planner  · Infection Control and Prevention measures reviewed  · All prior entries were reviewed  · Administer medications as ordered  · Prognosis: Guarded  · Discharge planning reviewed  · Follow up as outpatient. Thank you for allowing us to participate in the care of this patient. Please call with questions.   Ciaran Medina MD.    3/25/2020 1:37 PM

## 2020-03-25 NOTE — PROGRESS NOTES
PRN  glucagon (rDNA), 1 mg, PRN  dextrose, 100 mL/hr, PRN  LORazepam, 1 mg, Q6H PRN  dextromethorphan-guaiFENesin, 10 mL, Q4H PRN  sodium chloride flush, 10 mL, PRN  potassium chloride, 40 mEq, PRN    Or  potassium alternative oral replacement, 40 mEq, PRN    Or  potassium chloride, 10 mEq, PRN  magnesium hydroxide, 30 mL, Daily PRN  sodium chloride flush, 10 mL, PRN  polyethylene glycol, 17 g, Daily PRN  glycerin moisturizing mouth spray, 2 spray, PRN  metoprolol, 5 mg, Q6H PRN  ondansetron, 4 mg, Q6H PRN        SUPPORT DEVICES: [x] Ventilator [] BIPAP  [] Nasal Cannula [] Room Air    VENT SETTINGS (Comprehensive) (if applicable):  Vent Information  $Ventilation: (P) $Subsequent Day  Ventilator Started: Yes  Ventilator Stopped: Yes  Skin Assessment: Clean, dry, & intact  Equipment ID: [id number]  Equipment Changed: HME  Vent Type: (P) Servo i  Vent Mode: (P) CPAP  Vt Ordered: 350 mL  Pressure Ordered: 7  Rate Set: 28 bmp  Pressure Support: 8 cmH20  FiO2 : 40 %  Sensitivity: 1  PEEP/CPAP: 8  I Time/ I Time %: 0.75 s  Cuff Pressure (cm H2O): 25 cm H2O  Humidification Source: HME  Humidification Temp: 31  Humidification Temp Measured: 31  Circuit Condensation: Drained  Nitric Oxide/Epoprostenol In Use?: No  Additional Respiratory  Assessments  Pulse: 76  Resp: 28  SpO2: 98 %  End Tidal CO2: 36 (%)  pCO2 (TCOM, mmHg): 36 mmHg  Position: Semi-Shelton's  Humidification Source: HME  Humidification Temp: 31  Circuit Condensation: Drained  Oral Care Completed?: Yes  Oral Care: Mouthwash, Mouth swabbed, Mouth moisturizer, Mouth suctioned  Subglottic Suction Done?: No  Cuff Pressure (cm H2O): 25 cm H2O  Skin barrier applied: No    ABGs:   Lab Results   Component Value Date    EDP3KYT 35 03/25/2020    FIO2 45.0 03/25/2020     Lactic Acid:   Lab Results   Component Value Date    LACTA 4.1 04/30/2018     DATA:  Complete Blood Count:   Recent Labs     03/23/20  0511 03/23/20  1134 03/24/20  0557 03/25/20  0433   WBC 7.0  --  6.5 9.9 HGB 7.7* 9.0* 8.0* 9.1*   MCV 97.7  --  98.9 97.4     --  190 213   RBC 2.59*  --  2.68* 3.03*   HCT 25.3* 29.4* 26.5* 29.5*   MCH 29.7  --  29.9 30.0   MCHC 30.4  --  30.2 30.8   RDW 15.6*  --  15.6* 15.4*   MPV 10.5  --  10.6 10.6        PT/INR:    Lab Results   Component Value Date    PROTIME 11.0 02/10/2020    INR 1.0 02/10/2020     PTT:  No results found for: APTT, PTT    Basal Metabolic Profile:   Recent Labs     03/23/20  0511 03/25/20  0433   * 139   K 4.1 4.8   BUN 15 17   CREATININE 0.38* 0.46*    98   CO2 32* 31      Magnesium:   Lab Results   Component Value Date    MG 1.9 03/25/2020    MG 1.9 03/23/2020    MG 1.7 03/20/2020     Phosphorus:   Lab Results   Component Value Date    PHOS 3.2 03/25/2020    PHOS 2.6 03/23/2020    PHOS 3.1 03/20/2020     S. Calcium:  Recent Labs     03/25/20  0433   CALCIUM 8.7     S. Ionized Calcium:No results for input(s): IONCA in the last 72 hours. Urinalysis:   Lab Results   Component Value Date    NITRU NEGATIVE 03/17/2020    COLORU YELLOW 03/17/2020    PHUR 5.5 03/17/2020    WBCUA 0 TO 2 03/17/2020    RBCUA 20 TO 50 03/17/2020    MUCUS NOT REPORTED 03/17/2020    TRICHOMONAS NOT REPORTED 03/17/2020    YEAST MANY 03/17/2020    BACTERIA NOT REPORTED 03/17/2020    CLARITYU Clear 06/29/2018    SPECGRAV 1.013 03/17/2020    LEUKOCYTESUR NEGATIVE 03/17/2020    UROBILINOGEN Normal 03/17/2020    BILIRUBINUR NEGATIVE 03/17/2020    BILIRUBINUR Negative 06/29/2018    BLOODU Non-hemolyzed trace 06/29/2018    GLUCOSEU NEGATIVE 03/17/2020    KETUA NEGATIVE 03/17/2020    AMORPHOUS NOT REPORTED 03/17/2020       CARDIAC ENZYMES: No results for input(s): CKMB, CKMBINDEX, TROPONINI in the last 72 hours. Invalid input(s): CKTOTAL;3  BNP: No results for input(s): BNP in the last 72 hours. LFTS  No results for input(s): ALKPHOS, ALT, AST, BILITOT, BILIDIR, LABALBU in the last 72 hours. AMYLASE/LIPASE/AMMONIA  No results for input(s):  AMYLASE, LIPASE, AMMONIA along, plan is for PEG tube once stable.      Hx of severe Anxiety and panic attacks  Continue with BuSpar, Lexapro, and trazodone. Ativan PRN.      Sepsis likely due to underlying aspiration pneumonitis. Resolving   ID following along  Viral PCR panel negative. Tracheal aspirate positive for pseudohyphae/hyphae. Mix bacteria. Urine culture grew Candida. Blood cultures negative so far.   Currently on meropenem day 9 and fluconazole day 6  Leukocytosis resolved  UOP 1 L in 24-hour.     S/p hiatal hernia repair and Nissen fundoplication on 0/43/6281  Surgery following along. Plan for PEG tube      palliative care following the patient.       Samara Whiting MD.  Internal Medicine Resident   [facility], [place]   3/25/2020, 8:40 AM

## 2020-03-25 NOTE — PLAN OF CARE
Patients safety rights and dignity maintained throughout this shift. Frequently monitoring bilat wrist.  ROM performed q 2. Bed in low position. Side rails up x 2. Bed alarm in place. Will continue to monitor. Annemarie Rogers

## 2020-03-26 LAB
ABSOLUTE EOS #: 0.09 K/UL (ref 0–0.4)
ABSOLUTE IMMATURE GRANULOCYTE: 0.38 K/UL (ref 0–0.3)
ABSOLUTE LYMPH #: 0.94 K/UL (ref 1–4.8)
ABSOLUTE MONO #: 0.19 K/UL (ref 0.1–0.8)
ALBUMIN SERPL-MCNC: 1.5 G/DL (ref 3.5–5.2)
ALBUMIN/GLOBULIN RATIO: 0.4 (ref 1–2.5)
ALLEN TEST: ABNORMAL
ALP BLD-CCNC: 133 U/L (ref 35–104)
ALT SERPL-CCNC: 25 U/L (ref 5–33)
ANION GAP SERPL CALCULATED.3IONS-SCNC: 8 MMOL/L (ref 9–17)
AST SERPL-CCNC: 34 U/L
BASOPHILS # BLD: 0 % (ref 0–2)
BASOPHILS ABSOLUTE: 0 K/UL (ref 0–0.2)
BILIRUB SERPL-MCNC: 0.29 MG/DL (ref 0.3–1.2)
BUN BLDV-MCNC: 18 MG/DL (ref 8–23)
BUN/CREAT BLD: ABNORMAL (ref 9–20)
CALCIUM SERPL-MCNC: 8.6 MG/DL (ref 8.6–10.4)
CHLORIDE BLD-SCNC: 98 MMOL/L (ref 98–107)
CO2: 31 MMOL/L (ref 20–31)
CREAT SERPL-MCNC: 0.45 MG/DL (ref 0.5–0.9)
DIFFERENTIAL TYPE: ABNORMAL
EOSINOPHILS RELATIVE PERCENT: 1 % (ref 1–4)
FIO2: 40
GFR AFRICAN AMERICAN: >60 ML/MIN
GFR NON-AFRICAN AMERICAN: >60 ML/MIN
GFR SERPL CREATININE-BSD FRML MDRD: ABNORMAL ML/MIN/{1.73_M2}
GFR SERPL CREATININE-BSD FRML MDRD: ABNORMAL ML/MIN/{1.73_M2}
GLUCOSE BLD-MCNC: 108 MG/DL (ref 65–105)
GLUCOSE BLD-MCNC: 111 MG/DL (ref 65–105)
GLUCOSE BLD-MCNC: 112 MG/DL (ref 65–105)
GLUCOSE BLD-MCNC: 116 MG/DL (ref 70–99)
GLUCOSE BLD-MCNC: 129 MG/DL (ref 74–100)
HCT VFR BLD CALC: 23.9 % (ref 36.3–47.1)
HEMOGLOBIN: 7.3 G/DL (ref 11.9–15.1)
IMMATURE GRANULOCYTES: 4 %
LYMPHOCYTES # BLD: 10 % (ref 24–44)
MCH RBC QN AUTO: 29.7 PG (ref 25.2–33.5)
MCHC RBC AUTO-ENTMCNC: 30.5 G/DL (ref 28.4–34.8)
MCV RBC AUTO: 97.2 FL (ref 82.6–102.9)
MODE: ABNORMAL
MONOCYTES # BLD: 2 % (ref 1–7)
MORPHOLOGY: ABNORMAL
NEGATIVE BASE EXCESS, ART: ABNORMAL (ref 0–2)
NRBC AUTOMATED: 0 PER 100 WBC
O2 DEVICE/FLOW/%: ABNORMAL
PATIENT TEMP: ABNORMAL
PDW BLD-RTO: 15.2 % (ref 11.8–14.4)
PLATELET # BLD: 188 K/UL (ref 138–453)
PLATELET ESTIMATE: ABNORMAL
PMV BLD AUTO: 10.7 FL (ref 8.1–13.5)
POC HCO3: 35.6 MMOL/L (ref 21–28)
POC LACTIC ACID: 0.92 MMOL/L (ref 0.56–1.39)
POC O2 SATURATION: 97 % (ref 94–98)
POC PCO2 TEMP: ABNORMAL MM HG
POC PCO2: 52.9 MM HG (ref 35–48)
POC PH TEMP: ABNORMAL
POC PH: 7.44 (ref 7.35–7.45)
POC PO2 TEMP: ABNORMAL MM HG
POC PO2: 87.8 MM HG (ref 83–108)
POSITIVE BASE EXCESS, ART: 10 (ref 0–3)
POTASSIUM SERPL-SCNC: 4.7 MMOL/L (ref 3.7–5.3)
RBC # BLD: 2.46 M/UL (ref 3.95–5.11)
RBC # BLD: ABNORMAL 10*6/UL
SAMPLE SITE: ABNORMAL
SEG NEUTROPHILS: 83 % (ref 36–66)
SEGMENTED NEUTROPHILS ABSOLUTE COUNT: 7.8 K/UL (ref 1.8–7.7)
SODIUM BLD-SCNC: 137 MMOL/L (ref 135–144)
TCO2 (CALC), ART: 37 MMOL/L (ref 22–29)
TOTAL PROTEIN: 5.6 G/DL (ref 6.4–8.3)
WBC # BLD: 9.4 K/UL (ref 3.5–11.3)
WBC # BLD: ABNORMAL 10*3/UL

## 2020-03-26 PROCEDURE — 2580000003 HC RX 258: Performed by: STUDENT IN AN ORGANIZED HEALTH CARE EDUCATION/TRAINING PROGRAM

## 2020-03-26 PROCEDURE — 87040 BLOOD CULTURE FOR BACTERIA: CPT

## 2020-03-26 PROCEDURE — 6370000000 HC RX 637 (ALT 250 FOR IP): Performed by: INTERNAL MEDICINE

## 2020-03-26 PROCEDURE — 6360000002 HC RX W HCPCS: Performed by: STUDENT IN AN ORGANIZED HEALTH CARE EDUCATION/TRAINING PROGRAM

## 2020-03-26 PROCEDURE — 80053 COMPREHEN METABOLIC PANEL: CPT

## 2020-03-26 PROCEDURE — 6370000000 HC RX 637 (ALT 250 FOR IP): Performed by: STUDENT IN AN ORGANIZED HEALTH CARE EDUCATION/TRAINING PROGRAM

## 2020-03-26 PROCEDURE — 36415 COLL VENOUS BLD VENIPUNCTURE: CPT

## 2020-03-26 PROCEDURE — 85025 COMPLETE CBC W/AUTO DIFF WBC: CPT

## 2020-03-26 PROCEDURE — 94761 N-INVAS EAR/PLS OXIMETRY MLT: CPT

## 2020-03-26 PROCEDURE — 82803 BLOOD GASES ANY COMBINATION: CPT

## 2020-03-26 PROCEDURE — 6360000002 HC RX W HCPCS: Performed by: EMERGENCY MEDICINE

## 2020-03-26 PROCEDURE — 82947 ASSAY GLUCOSE BLOOD QUANT: CPT

## 2020-03-26 PROCEDURE — 94770 HC ETCO2 MONITOR DAILY: CPT

## 2020-03-26 PROCEDURE — 94640 AIRWAY INHALATION TREATMENT: CPT

## 2020-03-26 PROCEDURE — 2000000000 HC ICU R&B

## 2020-03-26 PROCEDURE — 83605 ASSAY OF LACTIC ACID: CPT

## 2020-03-26 PROCEDURE — 6360000002 HC RX W HCPCS: Performed by: INTERNAL MEDICINE

## 2020-03-26 PROCEDURE — 94003 VENT MGMT INPAT SUBQ DAY: CPT

## 2020-03-26 PROCEDURE — C9113 INJ PANTOPRAZOLE SODIUM, VIA: HCPCS | Performed by: STUDENT IN AN ORGANIZED HEALTH CARE EDUCATION/TRAINING PROGRAM

## 2020-03-26 PROCEDURE — 2500000003 HC RX 250 WO HCPCS: Performed by: STUDENT IN AN ORGANIZED HEALTH CARE EDUCATION/TRAINING PROGRAM

## 2020-03-26 PROCEDURE — 37799 UNLISTED PX VASCULAR SURGERY: CPT

## 2020-03-26 PROCEDURE — 99232 SBSQ HOSP IP/OBS MODERATE 35: CPT | Performed by: INTERNAL MEDICINE

## 2020-03-26 PROCEDURE — 2700000000 HC OXYGEN THERAPY PER DAY

## 2020-03-26 PROCEDURE — 99291 CRITICAL CARE FIRST HOUR: CPT | Performed by: INTERNAL MEDICINE

## 2020-03-26 RX ORDER — HEPARIN SODIUM 5000 [USP'U]/ML
5000 INJECTION, SOLUTION INTRAVENOUS; SUBCUTANEOUS EVERY 8 HOURS SCHEDULED
Status: DISCONTINUED | OUTPATIENT
Start: 2020-03-26 | End: 2020-04-06 | Stop reason: HOSPADM

## 2020-03-26 RX ORDER — IPRATROPIUM BROMIDE AND ALBUTEROL SULFATE 2.5; .5 MG/3ML; MG/3ML
1 SOLUTION RESPIRATORY (INHALATION) 4 TIMES DAILY
Status: DISCONTINUED | OUTPATIENT
Start: 2020-03-26 | End: 2020-04-06 | Stop reason: HOSPADM

## 2020-03-26 RX ORDER — FUROSEMIDE 10 MG/ML
20 INJECTION INTRAMUSCULAR; INTRAVENOUS DAILY
Status: DISCONTINUED | OUTPATIENT
Start: 2020-03-26 | End: 2020-04-06 | Stop reason: HOSPADM

## 2020-03-26 RX ORDER — FUROSEMIDE 10 MG/ML
INJECTION INTRAMUSCULAR; INTRAVENOUS
Status: DISPENSED
Start: 2020-03-26 | End: 2020-03-26

## 2020-03-26 RX ORDER — ALBUTEROL SULFATE 2.5 MG/3ML
2.5 SOLUTION RESPIRATORY (INHALATION) 2 TIMES DAILY
Status: DISCONTINUED | OUTPATIENT
Start: 2020-03-27 | End: 2020-03-27

## 2020-03-26 RX ORDER — MIDODRINE HYDROCHLORIDE 5 MG/1
5 TABLET ORAL
Status: DISCONTINUED | OUTPATIENT
Start: 2020-03-26 | End: 2020-04-03

## 2020-03-26 RX ADMIN — GABAPENTIN 300 MG: 250 SOLUTION ORAL at 13:37

## 2020-03-26 RX ADMIN — SUCRALFATE 1 G: 1 TABLET ORAL at 22:37

## 2020-03-26 RX ADMIN — MEROPENEM 1 G: 1 INJECTION, POWDER, FOR SOLUTION INTRAVENOUS at 05:31

## 2020-03-26 RX ADMIN — POTASSIUM CHLORIDE: 2 INJECTION, SOLUTION, CONCENTRATE INTRAVENOUS at 17:13

## 2020-03-26 RX ADMIN — SODIUM CHLORIDE, PRESERVATIVE FREE 10 ML: 5 INJECTION INTRAVENOUS at 08:27

## 2020-03-26 RX ADMIN — IPRATROPIUM BROMIDE AND ALBUTEROL SULFATE 1 AMPULE: .5; 3 SOLUTION RESPIRATORY (INHALATION) at 07:41

## 2020-03-26 RX ADMIN — MEROPENEM 1 G: 1 INJECTION, POWDER, FOR SOLUTION INTRAVENOUS at 11:54

## 2020-03-26 RX ADMIN — METOCLOPRAMIDE 10 MG: 5 INJECTION, SOLUTION INTRAMUSCULAR; INTRAVENOUS at 11:54

## 2020-03-26 RX ADMIN — Medication 10 ML: at 08:27

## 2020-03-26 RX ADMIN — METOCLOPRAMIDE 10 MG: 5 INJECTION, SOLUTION INTRAMUSCULAR; INTRAVENOUS at 17:41

## 2020-03-26 RX ADMIN — METOCLOPRAMIDE 10 MG: 5 INJECTION, SOLUTION INTRAMUSCULAR; INTRAVENOUS at 05:31

## 2020-03-26 RX ADMIN — Medication 240 ML: at 09:23

## 2020-03-26 RX ADMIN — METOCLOPRAMIDE 10 MG: 5 INJECTION, SOLUTION INTRAMUSCULAR; INTRAVENOUS at 00:10

## 2020-03-26 RX ADMIN — Medication 100 MCG/HR: at 07:01

## 2020-03-26 RX ADMIN — BISACODYL 10 MG: 10 SUPPOSITORY RECTAL at 08:26

## 2020-03-26 RX ADMIN — MIDODRINE HYDROCHLORIDE 5 MG: 5 TABLET ORAL at 17:41

## 2020-03-26 RX ADMIN — IPRATROPIUM BROMIDE AND ALBUTEROL SULFATE 1 AMPULE: .5; 3 SOLUTION RESPIRATORY (INHALATION) at 13:33

## 2020-03-26 RX ADMIN — BUSPIRONE HYDROCHLORIDE 20 MG: 10 TABLET ORAL at 22:00

## 2020-03-26 RX ADMIN — ACETAMINOPHEN 650 MG: 325 TABLET ORAL at 07:16

## 2020-03-26 RX ADMIN — Medication 6 MG/HR: at 05:31

## 2020-03-26 RX ADMIN — MEROPENEM 1 G: 1 INJECTION, POWDER, FOR SOLUTION INTRAVENOUS at 21:00

## 2020-03-26 RX ADMIN — SUCRALFATE 1 G: 1 TABLET ORAL at 17:41

## 2020-03-26 RX ADMIN — SODIUM CHLORIDE, PRESERVATIVE FREE 10 ML: 5 INJECTION INTRAVENOUS at 21:00

## 2020-03-26 RX ADMIN — FLUCONAZOLE 400 MG: 2 INJECTION, SOLUTION INTRAVENOUS at 10:25

## 2020-03-26 RX ADMIN — METOCLOPRAMIDE 10 MG: 5 INJECTION, SOLUTION INTRAMUSCULAR; INTRAVENOUS at 22:38

## 2020-03-26 RX ADMIN — ESCITALOPRAM OXALATE 20 MG: 10 TABLET ORAL at 08:26

## 2020-03-26 RX ADMIN — MIDODRINE HYDROCHLORIDE 5 MG: 5 TABLET ORAL at 10:51

## 2020-03-26 RX ADMIN — PANTOPRAZOLE SODIUM 40 MG: 40 INJECTION, POWDER, FOR SOLUTION INTRAVENOUS at 08:26

## 2020-03-26 RX ADMIN — Medication 10 ML: at 07:15

## 2020-03-26 RX ADMIN — Medication 125 MCG/HR: at 22:33

## 2020-03-26 RX ADMIN — BUSPIRONE HYDROCHLORIDE 20 MG: 10 TABLET ORAL at 08:26

## 2020-03-26 RX ADMIN — TRAZODONE HYDROCHLORIDE 100 MG: 100 TABLET ORAL at 22:12

## 2020-03-26 RX ADMIN — I.V. FAT EMULSION 100 ML: 20 EMULSION INTRAVENOUS at 19:30

## 2020-03-26 RX ADMIN — Medication 125 MCG/HR: at 14:16

## 2020-03-26 RX ADMIN — SODIUM CHLORIDE: 9 INJECTION, SOLUTION INTRAVENOUS at 05:32

## 2020-03-26 RX ADMIN — GABAPENTIN 300 MG: 250 SOLUTION ORAL at 22:11

## 2020-03-26 RX ADMIN — Medication 10 ML: at 21:00

## 2020-03-26 RX ADMIN — HEPARIN SODIUM 5000 UNITS: 5000 INJECTION INTRAVENOUS; SUBCUTANEOUS at 22:12

## 2020-03-26 RX ADMIN — SUCRALFATE 1 G: 1 TABLET ORAL at 11:54

## 2020-03-26 RX ADMIN — SUCRALFATE 1 G: 1 TABLET ORAL at 00:10

## 2020-03-26 RX ADMIN — ACETAMINOPHEN 650 MG: 325 TABLET ORAL at 12:55

## 2020-03-26 RX ADMIN — FUROSEMIDE 20 MG: 10 INJECTION, SOLUTION INTRAMUSCULAR; INTRAVENOUS at 10:51

## 2020-03-26 RX ADMIN — SUCRALFATE 1 G: 1 TABLET ORAL at 05:33

## 2020-03-26 RX ADMIN — PANTOPRAZOLE SODIUM 40 MG: 40 INJECTION, POWDER, FOR SOLUTION INTRAVENOUS at 21:00

## 2020-03-26 RX ADMIN — BUSPIRONE HYDROCHLORIDE 20 MG: 10 TABLET ORAL at 13:37

## 2020-03-26 RX ADMIN — IPRATROPIUM BROMIDE AND ALBUTEROL SULFATE 1 AMPULE: .5; 3 SOLUTION RESPIRATORY (INHALATION) at 19:57

## 2020-03-26 RX ADMIN — IPRATROPIUM BROMIDE AND ALBUTEROL SULFATE 1 AMPULE: .5; 3 SOLUTION RESPIRATORY (INHALATION) at 02:55

## 2020-03-26 RX ADMIN — Medication 7 MG/HR: at 17:38

## 2020-03-26 RX ADMIN — GABAPENTIN 300 MG: 250 SOLUTION ORAL at 07:19

## 2020-03-26 RX ADMIN — ALBUTEROL SULFATE 2.5 MG: 2.5 SOLUTION RESPIRATORY (INHALATION) at 23:38

## 2020-03-26 RX ADMIN — CLONAZEPAM 0.5 MG: 0.5 TABLET ORAL at 22:37

## 2020-03-26 RX ADMIN — HEPARIN SODIUM 5000 UNITS: 5000 INJECTION INTRAVENOUS; SUBCUTANEOUS at 13:37

## 2020-03-26 ASSESSMENT — PULMONARY FUNCTION TESTS
PIF_VALUE: 23
PIF_VALUE: 26
PIF_VALUE: 13
PIF_VALUE: 22
PIF_VALUE: 30
PIF_VALUE: 12
PIF_VALUE: 15
PIF_VALUE: 24
PIF_VALUE: 21
PIF_VALUE: 28
PIF_VALUE: 27
PIF_VALUE: 29
PIF_VALUE: 7
PIF_VALUE: 32
PIF_VALUE: 18
PIF_VALUE: 16
PIF_VALUE: 22
PIF_VALUE: 23
PIF_VALUE: 12

## 2020-03-26 ASSESSMENT — PAIN SCALES - GENERAL
PAINLEVEL_OUTOF10: 0

## 2020-03-26 NOTE — PLAN OF CARE
Problem: OXYGENATION/RESPIRATORY FUNCTION  Goal: Patient will maintain patent airway  3/26/2020 0750 by Claudell Pou, RCP  Outcome: Ongoing     Problem: OXYGENATION/RESPIRATORY FUNCTION  Goal: Patient will achieve/maintain normal respiratory rate/effort  Description: Respiratory rate and effort will be within normal limits for the patient  3/26/2020 0750 by Claudell Pou, RCP  Outcome: Ongoing     Problem: MECHANICAL VENTILATION  Goal: Patient will maintain patent airway  3/26/2020 0750 by Claudell Pou, RCP  Outcome: Ongoing     Problem: MECHANICAL VENTILATION  Goal: Oral health is maintained or improved  3/26/2020 0750 by Claudell Pou, RCP  Outcome: Ongoing     Problem: MECHANICAL VENTILATION  Goal: ET tube will be managed safely  3/26/2020 0750 by Claudell Pou, RCP  Outcome: Ongoing     Problem: MECHANICAL VENTILATION  Goal: Ability to express needs and understand communication  3/26/2020 0750 by Claudell Pou, RCP  Outcome: Ongoing     Problem: MECHANICAL VENTILATION  Goal: Mobility/activity is maintained at optimum level for patient  3/26/2020 0750 by Claudell Pou, RCP  Outcome: Ongoing   Ventilator Bronchodilator assessment    Breath sounds: Rhonchi, Diminished  Inspiratory Pressure: 14  Plateau Pressure: unable  Patient assessed at level: 3          [x]    Bronchodilator Assessment    BRONCHODILATOR ASSESSMENT SCORE  Score 0 (Home) 1 2 3 4   Breath Sounds   []  Chronic Ventilator: Patient at baseline []  Mild Wheezes/ Clear []  Intermittent wheezes with good air entry [x]  Bilateral/unilateral wheezing with diminished air entry []  Insp/Exp wheeze and/or poor aeration   Ventilator Pressures   []  Chronic Ventilator [x]  Insp. Pressure less than 25 cm H20 [x]  Insp. Pressure less than 25 cm H20 []  Insp. Pressure exceeds 25 cm H20 []  Insp.  Pressure exceeds 30 cm H20   Plateau Pressure [x]  NA   []  Plateau Pressure less than 4  []  Plateau Pressure less than or equal to 5 []  [facility] [place]

## 2020-03-26 NOTE — FLOWSHEET NOTE
0600 Patient's son arrived to ICU stating he was to be here to meet with surgery team to discuss trach/peg. Writer requested son to wait in unit waiting area while write finishes patient care. 0630 Dr Cruz Hutchins at bedside, updated on pt condition. Temp max 101.1, pt response to stimulus increased [id number], -170, RR 35-45 and spO2 85-90% requiring periods of 100% fiO2 to increase and maintain spO2 above 90%. Patient improves when no stimulation with care. Dr Cruz Hutchins spoke with patient's son regarding surgery plans for next week. 4716 patient's son visiting at bedside, he was able to see the patient's response to stimulus as described above. He denied further questions for writer at this time. Day shift nurse reported to bedside for shift report. Son voiced new questions and concerns to day shift nurse. He voiced confusion about meeting with surgeon this early morning and confusion about timeframe for surgery, and why patient was not going to surgery this morning.   Son appeared frustrated, day shift nurse clarified that original plan for surgery was undecided based on patient condition and his conversation with surgery team.

## 2020-03-26 NOTE — PROGRESS NOTES
Critical Care Team - Daily Progress Note      Date and time: 3/26/2020 10:12 AM  Patient's name:  Adalberto Johnson  Medical Record Number: [id number]  Patient's account/billing number: [de-identified]  Patient's YOB: 1945  Age: 76 y.o. Date of Admission: 2/29/2020 10:37 AM  Length of stay during current admission: 26      Primary Care Physician: Keshav Zamarripa MD  ICU Attending Physician: Dr. Renae Ojeda Status: Full Code    Reason for ICU admission: No chief complaint on file.         SUBJECTIVE:     OVERNIGHT EVENTS:      Patient is still breath stacking  Overnight went up on FiO2 100%  Now on  FiO2 50%  Still has fever    AWAKE & FOLLOWING COMMANDS:  [x] No   [] Yes    CURRENT VENTILATION STATUS:     [x] Ventilator  [] BIPAP  [] Nasal Cannula [] Room Air      IF INTUBATED, ET TUBE MARKING AT LOWER LIP:       cms    SECRETIONS Amount:  [] Small [x] Moderate  [] Large  [] None  Color:     [] White [x] Colored  [] Bloody    SEDATION:  RAAS Score:  [] Propofol gtt  [x] Versed gtt  [] Ativan gtt   [] No Sedation    PARALYZED:  [x] No    [] Yes    DIARRHEA:                [x] No                [] Yes  (C. Difficile status: [] positive                                                                                                                       [] negative                                                                                                                     [] pending)    VASOPRESSORS:  [x] No    [] Yes    If yes -   [] Levophed       [] Dopamine     [] Vasopressin       [] Dobutamine  [] Phenylephrine         [] Epinephrine     CENTRAL LINES:     [x] No       DUDLEY'S CATHETER:   [] No   [x] Yes  (Date of Insertion:   )     URINE OUTPUT:            [x] Good   [] Low              [] Anuric      OBJECTIVE:     VITAL SIGNS:  BP (!) 101/43   Pulse 95   Temp 100.6 °F (38.1 °C) (Bladder)   Resp 27   Ht 5' 2\" (1.575 m)   Wt 206 lb 9.1 oz (93.7 kg)   SpO2 97%   BMI 37.78 kg/m²   Tmax over midazolam 7 mg/hr (03/26/20 0945)     PRN Meds:   artificial tears, , PRN  mineral oil, 45 mL, Daily PRN  acetaminophen, 650 mg, Q4H PRN    Or  acetaminophen, 650 mg, Q6H PRN  albuterol, 2.5 mg, Q6H PRN  glucose, 15 g, PRN  dextrose, 12.5 g, PRN  glucagon (rDNA), 1 mg, PRN  dextrose, 100 mL/hr, PRN  LORazepam, 1 mg, Q6H PRN  dextromethorphan-guaiFENesin, 10 mL, Q4H PRN  sodium chloride flush, 10 mL, PRN  potassium chloride, 40 mEq, PRN    Or  potassium alternative oral replacement, 40 mEq, PRN    Or  potassium chloride, 10 mEq, PRN  magnesium hydroxide, 30 mL, Daily PRN  sodium chloride flush, 10 mL, PRN  polyethylene glycol, 17 g, Daily PRN  glycerin moisturizing mouth spray, 2 spray, PRN  metoprolol, 5 mg, Q6H PRN  ondansetron, 4 mg, Q6H PRN        SUPPORT DEVICES: [x] Ventilator [] BIPAP  [] Nasal Cannula [] Room Air    VENT SETTINGS (Comprehensive) (if applicable):    Vent Information  $Ventilation: $Subsequent Day  Ventilator Started: Yes  Ventilator Stopped: Yes  Skin Assessment: Clean, dry, & intact  Equipment ID: [id number]  Equipment Changed: HME  Vent Type: Servo i  Vent Mode: PRVC  Vt Ordered: 350 mL  Pressure Ordered: 7  Rate Set: 28 bmp  Pressure Support: 8 cmH20  FiO2 : 50 %  Sensitivity: 1  PEEP/CPAP: 8  I Time/ I Time %: 0.7 s  Cuff Pressure (cm H2O): 25 cm H2O  Humidification Source: E  Humidification Temp: 31  Humidification Temp Measured: 31  Circuit Condensation: Drained  Nitric Oxide/Epoprostenol In Use?: No  Additional Respiratory  Assessments  Pulse: 95  Resp: 27  SpO2: 97 %  End Tidal CO2: 46 (%)  pCO2 (TCOM, mmHg): 36 mmHg  Position: Semi-Shelton's  Humidification Source: E  Humidification Temp: 31  Circuit Condensation: Drained  Oral Care Completed?: Yes  Oral Care: Mouthwash, Mouth suctioned, Lip moisturizer applied  Subglottic Suction Done?: Yes  Cuff Pressure (cm H2O): 25 cm H2O  Skin barrier applied: No    ABGs:     Lab Results   Component Value Date    FWD2EQX 37 03/26/2020    FIO2 40.0 03/26/20  0520   ALKPHOS 133*   ALT 25   AST 34*   BILITOT 0.29*   LABALBU 1.5*       AMYLASE/LIPASE/AMMONIA  No results for input(s): AMYLASE, LIPASE, AMMONIA in the last 72 hours. Last 3 Blood Glucose:   Recent Labs     03/25/20  0433 03/26/20  0520   GLUCOSE 130* 116*      HgBA1c:  No results found for: LABA1C      TSH:  No results found for: TSH  ANEMIA STUDIES  No results for input(s): LABIRON, TIBC, FERRITIN, [id number], FOLATE, OCCULTBLD in the last 72 hours. Cultures during this admission:     Blood cultures:                 [] None drawn      [x] Negative             []  Positive (Details:  )  Urine Culture:                   [] None drawn      [] Negative             [x]  Positive (Details: Candida albicans)  Sputum Culture:               [] None drawn       [] Negative             []  Positive (Details: Candida albicans)   Endotracheal aspirate:     [] None drawn       [] Negative             []  Positive (Details: Candida albicans)             Chest Xray (3/26/2020):    ASSESSMENT:     Patient Active Problem List   Diagnosis    Frequency of urination    Severe sepsis ([address]    Back pain    GERD (gastroesophageal reflux disease)    MVP (mitral valve prolapse)    Depression    Anxiety    Urine retention    Acute kidney injury ([address]    Esophageal dysphagia    Paraesophageal hernia    History of repair of hiatal hernia    Aspiration pneumonia ([address]    Respiratory failure ([address]    Centrilobular emphysema ([address]    Atelectasis    Pleural effusion    Esophageal dilatation    Upper GI bleeding    Acute on chronic blood loss anemia    Shock ([address]    Fever    Acute postoperative respiratory insufficiency    Critical illness polyneuropathy ([address]           PLAN:     Feeding  TPN 65 ml/hr    1. Fluids KVO    2. Family YES     3. Analgesic fentanyl  125 mcg gtt    4. Sedation versed 8    5. Thrombo-prophylaxis heparin 5000 tid    6. Mobility poor     7.  Heads up 30

## 2020-03-26 NOTE — CARE COORDINATION
Care Transition  Met with nurse and critial care team. Surgery round this morning and do not feel patient is appropriate for surgery. Patient will need trach and Peg when more medically stable. Critical care team will coordinate trach and peg surgery with the surgical team. Referrals to [address] [place] [address]

## 2020-03-26 NOTE — PROGRESS NOTES
[facility]  Progress Note    Ada Barba  MRN: [id number]  Date: 3/26/2020    Reason for F/U :   Fever, Aspiration pneumonia. Impression :   1. Acute hypoxic respiratory failure currently ventilator dependent-intubated 3/17  2. Aspiration pneumonia with ARDS-like picture  3. Hiatal hernia with recent fundoplication 8/84/1324  4. GI bleeding related to a GE junction ulcer status post EGD 3/17  5. Bilateral pleural effusions  6. Persistent fever    Recommendations:   · The patient continues on meropenem day #10  · Continue Diflucan through 3/26 given that the patient did have funguria as well as yeast isolated in the sputum  · If the fevers persist I would consider a repeat CT imaging of the chest  · The patient will have PEG tube and tracheostomy placement early next week per the surgical service    Infection Control Recommendations:   Universal precautions    Discharge Planning:   Estimated Length of IV antimicrobials: To be determined  Patient will need Midline Catheter Insertion/ PICC line Insertion: No  Patient will need: Home IV , [place],  SNF,  LTAC: Undetermined  Patient willneed outpatient wound care: No    MedicalDecision making / Summary of Stay:   Ada Barba is a 76y.o.-year-old  female who was initially admitted on 2/29/2020. Patient seen at the request of Dr. Eliana Thakkar     INITIAL HISTORY:     She underwent laparoscopic robotic hiatal hernia repair and fundoplication last month on 02/24/2020 and postoperatively she developed hypoxia. CT chest done on 02/26/2020 showed bibasilar atelectasis/consolidation and patchy area of infiltrate in the right upper lobe. Patient was discharged on home oxygen on 2-28.     She presented back to [facility] ER on 2-29 with worsening shortness of breath, dyspnea as well as an episode of dark red emesis. Her SaO2 was 60 % in the ED. There was concern of aspiration.     CTA chest done was negative for PE, showed bilateral pleural

## 2020-03-26 NOTE — PROGRESS NOTES
Nutrition Assessment (Parenteral Nutrition)    Type and Reason for Visit: Reassess    Nutrition Recommendations: Continue Parenteral Nutrition-decrease potassium slightly in next bag. Will continue to monitor TPN/labs and care plans. Nutrition Assessment: Pt remains on vent and TPN. Nutrition Risk Level: High    Nutrient Needs:  · Estimated Daily Total Kcal: 8126-7337 kcal/day  · Estimated Daily Protein (g):  g pro/day     Nutrition Diagnosis:   · Problem: Inadequate oral intake  · Etiology: related to Impaired respiratory function-inability to consume food, Alteration in GI function, Nausea, Vomiting     Signs and symptoms:  as evidenced by Nutrition support - PN, NPO status due to medical condition    Objective Information:  · Wound Type: Surgical Wound  · Current Nutrition Therapies:  · Oral Diet Orders: NPO   · Parenteral Nutrition Orders:  · Type and Formula: 2-in-1 Custom(300gm dextrose, 90gm AA)   · Lipids: 100ml, Daily  · Rate/Volume: 65ml per hr  · Duration: Continuous  · Current PN Order Provides: 1580 kcal, 90gm protein  · Anthropometric Measures:  · Ht: 5' 2\" (157.5 cm)   · Current Body Wt: 206 lb 9.1 oz (93.7 kg)  · Admission Body Wt: 197 lb 12 oz (89.7 kg)  · Usual Body Wt: (186 lb on 2/10/20)  · Ideal Body Wt: 110 lb (49.9 kg), % Ideal Body 179% (adm/ideal)   · BMI Classification: BMI 35.0 - 39.9 Obese Class II    Nutrition Interventions:   Continue Parenteral Nutrition-decrease potassium in next bag. Will continue to monitor TPN/labs and care plans.    Continued Inpatient Monitoring, Education Not Indicated    Nutrition Evaluation:   · Evaluation: Goal achieved   · Goals: meet % of estimated nutrition needs    · Monitoring: PN Intake, PN Tolerance, I&O, Weight, Pertinent Labs, Monitor Bowel Function      Electronically signed by Don Perez RD, LD on 3/26/20 at 2:15 PM EDT    Contact Number: [phone]

## 2020-03-27 ENCOUNTER — ANESTHESIA (OUTPATIENT)
Dept: OPERATING ROOM | Age: 75
DRG: 004 | End: 2020-03-27
Payer: MEDICARE

## 2020-03-27 ENCOUNTER — ANESTHESIA EVENT (OUTPATIENT)
Dept: OPERATING ROOM | Age: 75
DRG: 004 | End: 2020-03-27
Payer: MEDICARE

## 2020-03-27 ENCOUNTER — APPOINTMENT (OUTPATIENT)
Dept: GENERAL RADIOLOGY | Age: 75
DRG: 004 | End: 2020-03-27
Attending: INTERNAL MEDICINE
Payer: MEDICARE

## 2020-03-27 VITALS — RESPIRATION RATE: 20 BRPM | OXYGEN SATURATION: 100 % | TEMPERATURE: 98.2 F

## 2020-03-27 LAB
ABSOLUTE EOS #: 0.12 K/UL (ref 0–0.4)
ABSOLUTE IMMATURE GRANULOCYTE: 0.37 K/UL (ref 0–0.3)
ABSOLUTE LYMPH #: 1.35 K/UL (ref 1–4.8)
ABSOLUTE MONO #: 0.49 K/UL (ref 0.1–0.8)
ALLEN TEST: ABNORMAL
ANION GAP SERPL CALCULATED.3IONS-SCNC: 11 MMOL/L (ref 9–17)
BASOPHILS # BLD: 0 % (ref 0–2)
BASOPHILS ABSOLUTE: 0 K/UL (ref 0–0.2)
BUN BLDV-MCNC: 20 MG/DL (ref 8–23)
BUN/CREAT BLD: ABNORMAL (ref 9–20)
CALCIUM SERPL-MCNC: 9.2 MG/DL (ref 8.6–10.4)
CHLORIDE BLD-SCNC: 97 MMOL/L (ref 98–107)
CO2: 30 MMOL/L (ref 20–31)
CREAT SERPL-MCNC: 0.48 MG/DL (ref 0.5–0.9)
DIFFERENTIAL TYPE: ABNORMAL
EOSINOPHILS RELATIVE PERCENT: 1 % (ref 1–4)
FIO2: 50
GFR AFRICAN AMERICAN: >60 ML/MIN
GFR NON-AFRICAN AMERICAN: >60 ML/MIN
GFR SERPL CREATININE-BSD FRML MDRD: ABNORMAL ML/MIN/{1.73_M2}
GFR SERPL CREATININE-BSD FRML MDRD: ABNORMAL ML/MIN/{1.73_M2}
GLUCOSE BLD-MCNC: 112 MG/DL (ref 70–99)
GLUCOSE BLD-MCNC: 136 MG/DL (ref 65–105)
HCT VFR BLD CALC: 25.4 % (ref 36.3–47.1)
HEMOGLOBIN: 7.7 G/DL (ref 11.9–15.1)
IMMATURE GRANULOCYTES: 3 %
INR BLD: 1
LYMPHOCYTES # BLD: 11 % (ref 24–44)
MAGNESIUM: 2 MG/DL (ref 1.6–2.6)
MCH RBC QN AUTO: 29.4 PG (ref 25.2–33.5)
MCHC RBC AUTO-ENTMCNC: 30.3 G/DL (ref 28.4–34.8)
MCV RBC AUTO: 96.9 FL (ref 82.6–102.9)
MODE: ABNORMAL
MONOCYTES # BLD: 4 % (ref 1–7)
MORPHOLOGY: ABNORMAL
NEGATIVE BASE EXCESS, ART: ABNORMAL (ref 0–2)
NRBC AUTOMATED: 0 PER 100 WBC
O2 DEVICE/FLOW/%: ABNORMAL
PARTIAL THROMBOPLASTIN TIME: 26.2 SEC (ref 20.5–30.5)
PATIENT TEMP: ABNORMAL
PDW BLD-RTO: 15.5 % (ref 11.8–14.4)
PHOSPHORUS: 3.3 MG/DL (ref 2.6–4.5)
PLATELET # BLD: 215 K/UL (ref 138–453)
PLATELET ESTIMATE: ABNORMAL
PMV BLD AUTO: 11.3 FL (ref 8.1–13.5)
POC HCO3: 36.6 MMOL/L (ref 21–28)
POC O2 SATURATION: 91 % (ref 94–98)
POC PCO2 TEMP: ABNORMAL MM HG
POC PCO2: 49.7 MM HG (ref 35–48)
POC PH TEMP: ABNORMAL
POC PH: 7.47 (ref 7.35–7.45)
POC PO2 TEMP: ABNORMAL MM HG
POC PO2: 57.9 MM HG (ref 83–108)
POSITIVE BASE EXCESS, ART: 12 (ref 0–3)
POTASSIUM SERPL-SCNC: 4.8 MMOL/L (ref 3.7–5.3)
PROTHROMBIN TIME: 10 SEC (ref 9–12)
RBC # BLD: 2.62 M/UL (ref 3.95–5.11)
RBC # BLD: ABNORMAL 10*6/UL
SAMPLE SITE: ABNORMAL
SEG NEUTROPHILS: 81 % (ref 36–66)
SEGMENTED NEUTROPHILS ABSOLUTE COUNT: 9.97 K/UL (ref 1.8–7.7)
SODIUM BLD-SCNC: 138 MMOL/L (ref 135–144)
TCO2 (CALC), ART: 38 MMOL/L (ref 22–29)
WBC # BLD: 12.3 K/UL (ref 3.5–11.3)
WBC # BLD: ABNORMAL 10*3/UL

## 2020-03-27 PROCEDURE — 3700000000 HC ANESTHESIA ATTENDED CARE: Performed by: SURGERY

## 2020-03-27 PROCEDURE — 6370000000 HC RX 637 (ALT 250 FOR IP): Performed by: STUDENT IN AN ORGANIZED HEALTH CARE EDUCATION/TRAINING PROGRAM

## 2020-03-27 PROCEDURE — 6360000002 HC RX W HCPCS: Performed by: STUDENT IN AN ORGANIZED HEALTH CARE EDUCATION/TRAINING PROGRAM

## 2020-03-27 PROCEDURE — 6360000002 HC RX W HCPCS: Performed by: INTERNAL MEDICINE

## 2020-03-27 PROCEDURE — 85730 THROMBOPLASTIN TIME PARTIAL: CPT

## 2020-03-27 PROCEDURE — 99233 SBSQ HOSP IP/OBS HIGH 50: CPT | Performed by: INTERNAL MEDICINE

## 2020-03-27 PROCEDURE — C9113 INJ PANTOPRAZOLE SODIUM, VIA: HCPCS | Performed by: STUDENT IN AN ORGANIZED HEALTH CARE EDUCATION/TRAINING PROGRAM

## 2020-03-27 PROCEDURE — 2000000000 HC ICU R&B

## 2020-03-27 PROCEDURE — 2580000003 HC RX 258: Performed by: STUDENT IN AN ORGANIZED HEALTH CARE EDUCATION/TRAINING PROGRAM

## 2020-03-27 PROCEDURE — 6370000000 HC RX 637 (ALT 250 FOR IP): Performed by: SURGERY

## 2020-03-27 PROCEDURE — 3700000001 HC ADD 15 MINUTES (ANESTHESIA): Performed by: SURGERY

## 2020-03-27 PROCEDURE — 80048 BASIC METABOLIC PNL TOTAL CA: CPT

## 2020-03-27 PROCEDURE — 31600 PLANNED TRACHEOSTOMY: CPT | Performed by: SURGERY

## 2020-03-27 PROCEDURE — 2500000003 HC RX 250 WO HCPCS: Performed by: STUDENT IN AN ORGANIZED HEALTH CARE EDUCATION/TRAINING PROGRAM

## 2020-03-27 PROCEDURE — 3609013300 HC EGD TUBE PLACEMENT: Performed by: SURGERY

## 2020-03-27 PROCEDURE — 37799 UNLISTED PX VASCULAR SURGERY: CPT

## 2020-03-27 PROCEDURE — 6360000002 HC RX W HCPCS: Performed by: EMERGENCY MEDICINE

## 2020-03-27 PROCEDURE — 6370000000 HC RX 637 (ALT 250 FOR IP): Performed by: INTERNAL MEDICINE

## 2020-03-27 PROCEDURE — 82803 BLOOD GASES ANY COMBINATION: CPT

## 2020-03-27 PROCEDURE — 2720000010 HC SURG SUPPLY STERILE: Performed by: SURGERY

## 2020-03-27 PROCEDURE — 2709999900 HC NON-CHARGEABLE SUPPLY: Performed by: SURGERY

## 2020-03-27 PROCEDURE — 2580000003 HC RX 258: Performed by: SPECIALIST

## 2020-03-27 PROCEDURE — 83735 ASSAY OF MAGNESIUM: CPT

## 2020-03-27 PROCEDURE — 0DH63UZ INSERTION OF FEEDING DEVICE INTO STOMACH, PERCUTANEOUS APPROACH: ICD-10-PCS | Performed by: SURGERY

## 2020-03-27 PROCEDURE — 2500000003 HC RX 250 WO HCPCS: Performed by: SPECIALIST

## 2020-03-27 PROCEDURE — 2580000003 HC RX 258: Performed by: SURGERY

## 2020-03-27 PROCEDURE — 43246 EGD PLACE GASTROSTOMY TUBE: CPT | Performed by: SURGERY

## 2020-03-27 PROCEDURE — 85025 COMPLETE CBC W/AUTO DIFF WBC: CPT

## 2020-03-27 PROCEDURE — 94770 HC ETCO2 MONITOR DAILY: CPT

## 2020-03-27 PROCEDURE — 0B110F4 BYPASS TRACHEA TO CUTANEOUS WITH TRACHEOSTOMY DEVICE, OPEN APPROACH: ICD-10-PCS | Performed by: SURGERY

## 2020-03-27 PROCEDURE — 94761 N-INVAS EAR/PLS OXIMETRY MLT: CPT

## 2020-03-27 PROCEDURE — 74018 RADEX ABDOMEN 1 VIEW: CPT

## 2020-03-27 PROCEDURE — 82947 ASSAY GLUCOSE BLOOD QUANT: CPT

## 2020-03-27 PROCEDURE — 2700000000 HC OXYGEN THERAPY PER DAY

## 2020-03-27 PROCEDURE — 71045 X-RAY EXAM CHEST 1 VIEW: CPT

## 2020-03-27 PROCEDURE — 84100 ASSAY OF PHOSPHORUS: CPT

## 2020-03-27 PROCEDURE — 94003 VENT MGMT INPAT SUBQ DAY: CPT

## 2020-03-27 PROCEDURE — 99232 SBSQ HOSP IP/OBS MODERATE 35: CPT | Performed by: FAMILY MEDICINE

## 2020-03-27 PROCEDURE — 85610 PROTHROMBIN TIME: CPT

## 2020-03-27 PROCEDURE — 99291 CRITICAL CARE FIRST HOUR: CPT | Performed by: INTERNAL MEDICINE

## 2020-03-27 PROCEDURE — 3600000030 HC TRACHEOSTOMY: Performed by: SURGERY

## 2020-03-27 PROCEDURE — 6360000002 HC RX W HCPCS: Performed by: SPECIALIST

## 2020-03-27 PROCEDURE — 94640 AIRWAY INHALATION TREATMENT: CPT

## 2020-03-27 RX ORDER — POLYETHYLENE GLYCOL 3350 17 G/17G
17 POWDER, FOR SOLUTION ORAL DAILY
Status: DISCONTINUED | OUTPATIENT
Start: 2020-03-27 | End: 2020-04-06 | Stop reason: HOSPADM

## 2020-03-27 RX ORDER — ULTRASOUND COUPLING MEDIUM
GEL (GRAM) TOPICAL PRN
Status: DISCONTINUED | OUTPATIENT
Start: 2020-03-27 | End: 2020-03-27 | Stop reason: ALTCHOICE

## 2020-03-27 RX ORDER — SODIUM CHLORIDE 9 MG/ML
INJECTION, SOLUTION INTRAVENOUS CONTINUOUS PRN
Status: DISCONTINUED | OUTPATIENT
Start: 2020-03-27 | End: 2020-03-27 | Stop reason: SDUPTHER

## 2020-03-27 RX ORDER — FENTANYL CITRATE 50 UG/ML
INJECTION, SOLUTION INTRAMUSCULAR; INTRAVENOUS PRN
Status: DISCONTINUED | OUTPATIENT
Start: 2020-03-27 | End: 2020-03-27 | Stop reason: SDUPTHER

## 2020-03-27 RX ORDER — ROCURONIUM BROMIDE 10 MG/ML
INJECTION, SOLUTION INTRAVENOUS PRN
Status: DISCONTINUED | OUTPATIENT
Start: 2020-03-27 | End: 2020-03-27 | Stop reason: SDUPTHER

## 2020-03-27 RX ORDER — MAGNESIUM HYDROXIDE 1200 MG/15ML
LIQUID ORAL PRN
Status: DISCONTINUED | OUTPATIENT
Start: 2020-03-27 | End: 2020-03-27 | Stop reason: ALTCHOICE

## 2020-03-27 RX ORDER — MAGNESIUM CARB/ALUMINUM HYDROX 105-160MG
45 TABLET,CHEWABLE ORAL DAILY
Status: DISCONTINUED | OUTPATIENT
Start: 2020-03-27 | End: 2020-04-06 | Stop reason: HOSPADM

## 2020-03-27 RX ORDER — ALBUTEROL SULFATE 2.5 MG/3ML
2.5 SOLUTION RESPIRATORY (INHALATION) 2 TIMES DAILY
Status: DISCONTINUED | OUTPATIENT
Start: 2020-03-28 | End: 2020-04-06 | Stop reason: HOSPADM

## 2020-03-27 RX ADMIN — IPRATROPIUM BROMIDE AND ALBUTEROL SULFATE 1 AMPULE: .5; 3 SOLUTION RESPIRATORY (INHALATION) at 09:18

## 2020-03-27 RX ADMIN — Medication 10 ML: at 22:10

## 2020-03-27 RX ADMIN — IPRATROPIUM BROMIDE AND ALBUTEROL SULFATE 1 AMPULE: .5; 3 SOLUTION RESPIRATORY (INHALATION) at 12:34

## 2020-03-27 RX ADMIN — MEROPENEM 1 G: 1 INJECTION, POWDER, FOR SOLUTION INTRAVENOUS at 13:00

## 2020-03-27 RX ADMIN — ALBUTEROL SULFATE 2.5 MG: 2.5 SOLUTION RESPIRATORY (INHALATION) at 03:44

## 2020-03-27 RX ADMIN — MEROPENEM 1 G: 1 INJECTION, POWDER, FOR SOLUTION INTRAVENOUS at 05:17

## 2020-03-27 RX ADMIN — FENTANYL CITRATE 50 MCG: 50 INJECTION INTRAMUSCULAR; INTRAVENOUS at 10:32

## 2020-03-27 RX ADMIN — PHENYLEPHRINE HYDROCHLORIDE 200 MCG: 10 INJECTION INTRAVENOUS at 10:49

## 2020-03-27 RX ADMIN — Medication 125 MCG/HR: at 06:26

## 2020-03-27 RX ADMIN — HEPARIN SODIUM 5000 UNITS: 5000 INJECTION INTRAVENOUS; SUBCUTANEOUS at 14:32

## 2020-03-27 RX ADMIN — Medication 7 MG/HR: at 08:37

## 2020-03-27 RX ADMIN — Medication 100 MCG/HR: at 14:28

## 2020-03-27 RX ADMIN — TRAZODONE HYDROCHLORIDE 100 MG: 100 TABLET ORAL at 20:52

## 2020-03-27 RX ADMIN — MIDODRINE HYDROCHLORIDE 5 MG: 5 TABLET ORAL at 07:57

## 2020-03-27 RX ADMIN — PANTOPRAZOLE SODIUM 40 MG: 40 INJECTION, POWDER, FOR SOLUTION INTRAVENOUS at 22:10

## 2020-03-27 RX ADMIN — ROCURONIUM BROMIDE 50 MG: 10 INJECTION INTRAVENOUS at 10:29

## 2020-03-27 RX ADMIN — Medication 150 MCG/HR: at 23:40

## 2020-03-27 RX ADMIN — GABAPENTIN 300 MG: 250 SOLUTION ORAL at 21:12

## 2020-03-27 RX ADMIN — IPRATROPIUM BROMIDE AND ALBUTEROL SULFATE 1 AMPULE: .5; 3 SOLUTION RESPIRATORY (INHALATION) at 16:46

## 2020-03-27 RX ADMIN — PANTOPRAZOLE SODIUM 40 MG: 40 INJECTION, POWDER, FOR SOLUTION INTRAVENOUS at 08:40

## 2020-03-27 RX ADMIN — SUCRALFATE 1 G: 1 TABLET ORAL at 23:50

## 2020-03-27 RX ADMIN — MEROPENEM 1 G: 1 INJECTION, POWDER, FOR SOLUTION INTRAVENOUS at 20:52

## 2020-03-27 RX ADMIN — Medication 4 MG/HR: at 14:27

## 2020-03-27 RX ADMIN — PHENYLEPHRINE HYDROCHLORIDE 200 MCG: 10 INJECTION INTRAVENOUS at 10:54

## 2020-03-27 RX ADMIN — METOCLOPRAMIDE 10 MG: 5 INJECTION, SOLUTION INTRAMUSCULAR; INTRAVENOUS at 05:17

## 2020-03-27 RX ADMIN — SUCRALFATE 1 G: 1 TABLET ORAL at 17:26

## 2020-03-27 RX ADMIN — FUROSEMIDE 20 MG: 10 INJECTION, SOLUTION INTRAMUSCULAR; INTRAVENOUS at 08:40

## 2020-03-27 RX ADMIN — METOCLOPRAMIDE 10 MG: 5 INJECTION, SOLUTION INTRAMUSCULAR; INTRAVENOUS at 17:27

## 2020-03-27 RX ADMIN — IPRATROPIUM BROMIDE AND ALBUTEROL SULFATE 1 AMPULE: .5; 3 SOLUTION RESPIRATORY (INHALATION) at 20:02

## 2020-03-27 RX ADMIN — ACETAMINOPHEN 650 MG: 650 SUPPOSITORY RECTAL at 07:56

## 2020-03-27 RX ADMIN — CLONAZEPAM 0.5 MG: 0.5 TABLET ORAL at 20:58

## 2020-03-27 RX ADMIN — PHENYLEPHRINE HYDROCHLORIDE 100 MCG: 10 INJECTION INTRAVENOUS at 10:47

## 2020-03-27 RX ADMIN — METOCLOPRAMIDE 10 MG: 5 INJECTION, SOLUTION INTRAMUSCULAR; INTRAVENOUS at 23:50

## 2020-03-27 RX ADMIN — SODIUM CHLORIDE: 9 INJECTION, SOLUTION INTRAVENOUS at 10:21

## 2020-03-27 RX ADMIN — POTASSIUM CHLORIDE: 2 INJECTION, SOLUTION, CONCENTRATE INTRAVENOUS at 17:54

## 2020-03-27 RX ADMIN — MINERAL OIL 45 ML: 1000 SOLUTION ORAL at 12:45

## 2020-03-27 RX ADMIN — ACETAMINOPHEN 650 MG: 325 TABLET ORAL at 23:21

## 2020-03-27 RX ADMIN — Medication 3 MCG/MIN: at 08:38

## 2020-03-27 RX ADMIN — GABAPENTIN 300 MG: 250 SOLUTION ORAL at 14:32

## 2020-03-27 RX ADMIN — I.V. FAT EMULSION 100 ML: 20 EMULSION INTRAVENOUS at 17:54

## 2020-03-27 RX ADMIN — HEPARIN SODIUM 5000 UNITS: 5000 INJECTION INTRAVENOUS; SUBCUTANEOUS at 21:12

## 2020-03-27 RX ADMIN — MIDODRINE HYDROCHLORIDE 5 MG: 5 TABLET ORAL at 17:25

## 2020-03-27 RX ADMIN — BUSPIRONE HYDROCHLORIDE 20 MG: 10 TABLET ORAL at 20:52

## 2020-03-27 RX ADMIN — POLYETHYLENE GLYCOL 3350 17 G: 17 POWDER, FOR SOLUTION ORAL at 12:45

## 2020-03-27 RX ADMIN — ROCURONIUM BROMIDE 20 MG: 10 INJECTION INTRAVENOUS at 11:19

## 2020-03-27 RX ADMIN — FENTANYL CITRATE 50 MCG: 50 INJECTION INTRAMUSCULAR; INTRAVENOUS at 11:20

## 2020-03-27 RX ADMIN — BUSPIRONE HYDROCHLORIDE 20 MG: 10 TABLET ORAL at 14:32

## 2020-03-27 RX ADMIN — SODIUM CHLORIDE: 9 INJECTION, SOLUTION INTRAVENOUS at 10:38

## 2020-03-27 ASSESSMENT — PULMONARY FUNCTION TESTS
PIF_VALUE: 34
PIF_VALUE: 22
PIF_VALUE: 34
PIF_VALUE: 28
PIF_VALUE: 36
PIF_VALUE: 35
PIF_VALUE: 25
PIF_VALUE: 36
PIF_VALUE: 19
PIF_VALUE: 28
PIF_VALUE: 25
PIF_VALUE: 33
PIF_VALUE: 33
PIF_VALUE: 26
PIF_VALUE: 28
PIF_VALUE: 35
PIF_VALUE: 33
PIF_VALUE: 34
PIF_VALUE: 13
PIF_VALUE: 33
PIF_VALUE: 34
PIF_VALUE: 21
PIF_VALUE: 34
PIF_VALUE: 35
PIF_VALUE: 34
PIF_VALUE: 28
PIF_VALUE: 24
PIF_VALUE: 34
PIF_VALUE: 35
PIF_VALUE: 34
PIF_VALUE: 29
PIF_VALUE: 10
PIF_VALUE: 24
PIF_VALUE: 34
PIF_VALUE: 24
PIF_VALUE: 35
PIF_VALUE: 16
PIF_VALUE: 34
PIF_VALUE: 26
PIF_VALUE: 27
PIF_VALUE: 35
PIF_VALUE: 29
PIF_VALUE: 35
PIF_VALUE: 36
PIF_VALUE: 35
PIF_VALUE: 26
PIF_VALUE: 20
PIF_VALUE: 28
PIF_VALUE: 34
PIF_VALUE: 24
PIF_VALUE: 2
PIF_VALUE: 28
PIF_VALUE: 35
PIF_VALUE: 28
PIF_VALUE: 24
PIF_VALUE: 33
PIF_VALUE: 34
PIF_VALUE: 23
PIF_VALUE: 34
PIF_VALUE: 28
PIF_VALUE: 34
PIF_VALUE: 32
PIF_VALUE: 34
PIF_VALUE: 18
PIF_VALUE: 12

## 2020-03-27 ASSESSMENT — PAIN SCALES - GENERAL
PAINLEVEL_OUTOF10: 0

## 2020-03-27 NOTE — FLOWSHEET NOTE
DATE: 3/27/2020    NAME: Garrett Guan  MRN: [id number]   : 1945    Patient not seen this date for Physical Therapy due to:  [] Blood transfusion in progress  [] Hemodialysis  []  Patient Declined  [] Spine Precautions   [] Strict Bedrest  [x] Surgery/ Procedure; trach/peg  [] Testing      [] Other        [] PT being discontinued at this time. Patient independent. No further needs. [] PT being discontinued at this time as the patient has been transferred to palliative care. No further needs.     Franky Hogan, PTA

## 2020-03-27 NOTE — PROGRESS NOTES
Function      Electronically signed by Syed Jung RD, LD on 3/27/20 at 3:03 PM EDT    Contact Number: [phone]

## 2020-03-27 NOTE — CARE COORDINATION
Left VM for Agus at [facility] to discuss patient, await return call    [id number] received call from Topher at [facility], updated on trach/peg plan, they are following

## 2020-03-27 NOTE — OP NOTE
tracheal rings 2-3 with a #11 blade. A tracheal  was used to dilate the incision. A #7 Shiley was then opened. The balloon was checked. Anesthesia withdrew the ET tube under visualization. The Shiley tracheostomy tube was passed in the trachea with little resistance. A obturator was removed and the inner cannula was placed and connected to the vent. Anesthesia confirmed end tidal. Adequate tidal volumes were noted. The tracheostomy was then secured at the anterior neck with 2-0 nylon x2. The cuff was inflated and no evidence of air leak was noted. No evidence of bleeding was noted. The deep dermal layer superior and inferior to the tracheostomy were approximated using 3-0 vicryl suture. At this point, the procedure was concluded and the patient tolerated the procedure well. A stat CXR will be ordered and reviewed. Dr. Nhung Clemens was present throughout the procedure and all instruments were accounted for.     Plan:  - Keep gastrostomy tube clamped immediately postop  - Okay for medications through gastrostomy tube at 1730 today  - Okay to start tube feeds per critical care tomorrow morning at 0600  - Son updated by Dr. Nhung Clemens    Electronically signed by Mellissa Nichole DO  on 3/27/2020 at 11:53 AM

## 2020-03-27 NOTE — PLAN OF CARE
Problem: Pain:  Goal: Pain level will decrease  Description: Pain level will decrease  Outcome: Ongoing  Goal: Control of acute pain  Description: Control of acute pain  Outcome: Ongoing     Problem: Falls - Risk of:  Goal: Will remain free from falls  Description: Will remain free from falls  Outcome: Met This Shift  Goal: Absence of physical injury  Description: Absence of physical injury  Outcome: Met This Shift     Problem: Restraint Use - Nonviolent/Non-Self-Destructive Behavior:  Goal: Absence of restraint indications  Description: Absence of restraint indications  Outcome: Not Met This Shift  Goal: Absence of restraint-related injury  Description: Absence of restraint-related injury  Outcome: Met This Shift

## 2020-03-27 NOTE — PROGRESS NOTES
Pseudohyphae, few budding yeast cells seen. Candida albicans  Wound:  ·     Discussed with patient, RN. I have personally reviewed the past medical history, past surgical history, medications, social history, and family history, and I have updated the database accordingly.   Past Medical History:     Past Medical History:   Diagnosis Date    Anxiety     Arthritis     Back pain     Bronchitis     Caffeine use     8 coffee / day    Carpal tunnel syndrome     Cataracts, bilateral     Constipation     Depression     Diarrhea     GERD (gastroesophageal reflux disease)     Hyperlipidemia     Hypertension     Mumps     MVP (mitral valve prolapse)     Dr. Xavier Bowen in May 2019    Recurrent UTI     Dr. Farheen Jaimes    Sinusitis     Ulcerative esophagitis     Wellness examination     Dr. Chacho Osman seen in Jan 2020       Past Surgical  History:     Past Surgical History:   Procedure Laterality Date    APPENDECTOMY      CARPAL TUNNEL RELEASE      COLONOSCOPY      CYSTOSCOPY      EGD  3/17/2020         EYE SURGERY      patricia IOL    GASTRIC FUNDOPLICATION N/A 0/26/3647    XI LAPAROSCOPIC ROBOTIC HIATAL HERNIA REPAIR, CHERYL FUNDOPLICATION performed by Klever Latif MD at [address] [facility] [address] CATH POWER PICC TRIPLE  3/4/2020        [address] [place] [address]  02/24/2020    LAPAROSCOPIC ROBOTIC HIATAL HERNIA REPAIR, CHERYL FUNDOPLICATION     HYSTERECTOMY      Abdominal    JOINT REPLACEMENT Right     right hip    OVARY REMOVAL      RHINOPLASTY      TONSILLECTOMY      TOTAL HIP ARTHROPLASTY      TOTAL NEPHRECTOMY      atrophic from infections, age 13   [facility] TUBAL LIGATION      UPPER GASTROINTESTINAL ENDOSCOPY N/A 3/17/2020    BEDSIDE EGD ESOPHAGOGASTRODUODENOSCOPY (ICU) performed by Klever Latif MD at [facility] End[facility]       Medications:      [START ON 3/28/2020] albuterol  2.5 mg Nebulization BID    mineral oil  45 mL Per G Tube Daily    polyethylene glycol  17 g Per G Tube Daily    midodrine 5 mg Oral TID WC    heparin (porcine)  5,000 Units Subcutaneous 3 times per day    furosemide  20 mg Intravenous Daily    ipratropium-albuterol  1 ampule Inhalation 4x daily    labetalol  10 mg Intravenous Once    gabapentin  300 mg Oral 3 times per day    fat emulsion  100 mL Intravenous Daily    insulin lispro  0-6 Units Subcutaneous Q6H    midazolam  2 mg Intravenous Once    sucralfate  1 g Oral 4 times per day    meropenem  1 g Intravenous Q8H    pantoprazole  40 mg Intravenous BID    And    sodium chloride (PF)  10 mL Intravenous BID    metoclopramide  10 mg Intravenous Q6H    bisacodyl  10 mg Rectal Daily    lidocaine 1 % injection  5 mL Intradermal Once    sodium chloride flush  10 mL Intravenous 2 times per day    traZODone  100 mg Oral Nightly    magnesium citrate  296 mL Oral Once    clonazePAM  0.5 mg Oral BID    [Held by provider] amitriptyline  10 mg Oral TID    [Held by provider] amLODIPine  10 mg Oral Nightly    sodium chloride flush  10 mL Intravenous 2 times per day    busPIRone  20 mg Oral TID    [Held by provider] metoprolol succinate  25 mg Oral Nightly    nicotine  1 patch Transdermal Daily    sodium chloride (PF)  10 mL Intravenous Daily    escitalopram  20 mg Oral Daily       Social History:     Social History     Socioeconomic History    Marital status:      Spouse name: Not on file    Number of children: 2    Years of education: Not on file    Highest education level: Not on file   Occupational History    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   Social Needs    Financial resource strain: Not on file    Food insecurity     Worry: Not on file     Inability: Not on file   [employer] needs     Medical: Not on file     Non-medical: Not on file   Tobacco Use    Smoking status: Current Every Day Smoker     Packs/day: 1.00     Years: 50.00     Pack years: 50.00     Types: Cigarettes    Smokeless tobacco: Never Used   Substance and Sexual Activity    Alcohol use:  No

## 2020-03-27 NOTE — PROGRESS NOTES
Critical Care Team - Daily Progress Note      Date and time: 3/27/2020 2:29 PM  Patient's name:  Ada Barba  Medical Record Number: [id number]  Patient's account/billing number: [de-identified]  Patient's YOB: 1945  Age: 76 y.o. Date of Admission: 2/29/2020 10:37 AM  Length of stay during current admission: 27      Primary Care Physician: Luh Wyman MD  ICU Attending Physician: Dr. Hernando Stevenson Status: Full Code    Reason for ICU admission: No chief complaint on file. Subjective:   OVERNIGHT EVENTS: No issues overnight noted. This morning ABG showed low oxygen for which her FiO2 requirement increased to 70% and PEEP increased to 10. She gets tachypneic and agitated with stimulation. Surgery is planning to do PEG and trach today.  Feeding Diet: TPN 65   Fluids: Tim Polio   Family: uptodate with current treatment plan    Analgesic: fentanyl 125   Sedation: versed 7    Thrombo-prophylaxis: EPC Cuffs   Mobility: none   Heads up: 30   Ulcer prophylaxis: Protonix 40 twice daily, Carafate 1 g every 6 hour, Reglan 10 every 6 hour.  Glycemic control: good   Spontaneous breathing trial: not tolerating     Bowel regimen/urine output: NO BM/24 hours in spite of getting suppositories enemas and mineral oil. 3.2 L UOP/24H, No OG OP , + fluid balance of 196.  Indwelling catheter/lines: chu catheter/ right PICC line/ Left art line   De-escalation: PEG and trach to be done today.      AWAKE & FOLLOWING COMMANDS:  [x] No   [] Yes    CURRENT VENTILATION STATUS:     [x] Ventilator  [] BIPAP  [] Nasal Cannula [] Room Air      IF INTUBATED, ET TUBE MARKING AT LOWER LIP:  24cms    SECRETIONS Amount:  [x] Small [] Moderate  [] Large  [] None  Color:     [x] White [] Colored  [] Bloody    SEDATION:  RAAS Score:  [] Propofol gtt  [x] Versed gtt  [] Ativan gtt   [] No Sedation    PARALYZED:  [x] No      DIARRHEA:                [x] No                 VASOPRESSORS:  [x] No      CENTRAL LINES:     [x] No DUDLEY'S CATHETER:   [] No   [x] Yes  (Date of Insertion:   )     URINE OUTPUT:            [x] Good   [] Low              [] Anuric    REVIEW OF SYSTEMS:  Not obtained as patient is intubated    OBJECTIVE:     VITAL SIGNS:  BP (!) 101/43   Pulse 102   Temp 100 °F (37.8 °C) (Bladder)   Resp 25   Ht 5' 2\" (1.575 m)   Wt 203 lb 14.8 oz (92.5 kg)   SpO2 99%   BMI 37.30 kg/m²   Tmax over 24 hours:  Temp (24hrs), Av.9 °F (33.8 °C), Min:55.4 °F (13 °C), Max:100 °F (37.8 °C)      Patient Vitals for the past 8 hrs:   Pulse Resp SpO2   20 0928 102 25 99 %   20 0927 103 27 100 %   20 0700 107 (!) 31 99 %         Intake/Output Summary (Last 24 hours) at 3/27/2020 1429  Last data filed at 3/27/2020 1200  Gross per 24 hour   Intake 3509.6 ml   Output 3530 ml   Net -20.4 ml     Date 20 0000 - 20 2359   Shift 4623-5272 5553-3063 0144-0216 24 Hour Total   INTAKE   I.V.(mL/kg) 976. 9(10.6) 263.5(2.8)  1240.5(13.4)   TPN(mL/kg) 6348(48.3)   3056(52.8)   Shift Total(mL/kg) 2631. 9(28.5) 263.5(2.8)  2895. 5(31.3)   OUTPUT   Urine(mL/kg/hr) 750(1) 750  1500   Emesis/NG output(mL/kg) 500(5.4)   500(5.4)   Shift Total(mL/kg) 1250(13.5) 750(8.1)  2000(21.6)   Weight (kg) 92.5 92.5 92.5 92.5     Wt Readings from Last 3 Encounters:   20 203 lb 14.8 oz (92.5 kg)   20 186 lb 1.1 oz (84.4 kg)   20 183 lb 6.4 oz (83.2 kg)     Body mass index is 37.3 kg/m². PHYSICAL EXAM:  Constitutional: Intubated, sedated on Versed, not following any commands  Neck: Supple, symmetrical, trachea midline, no adenopathy, thyroid symmetric, no jvd skin normal  Respiratory: clear to auscultation, no wheezes or rales and unlabored breathing.  No intercostal tenderness  Cardiovascular: regular rate and rhythm, normal S1, S2, no murmur noted and 2+ pulses throughout  Abdomen: soft, nontender, nondistended, no masses or organomegaly  NEUROLOGIC Intubated, sedated and was not following any commands extremities:  peripheral pulses normal, no pedal edema, no clubbing or cyanosis  SKIN: normal coloration and turgor     Any additional physical findings:      MEDICATIONS:  Scheduled Meds:   [START ON 3/28/2020] albuterol  2.5 mg Nebulization BID    mineral oil  45 mL Per G Tube Daily    polyethylene glycol  17 g Per G Tube Daily    midodrine  5 mg Oral TID WC    heparin (porcine)  5,000 Units Subcutaneous 3 times per day    furosemide  20 mg Intravenous Daily    ipratropium-albuterol  1 ampule Inhalation 4x daily    labetalol  10 mg Intravenous Once    gabapentin  300 mg Oral 3 times per day    fat emulsion  100 mL Intravenous Daily    insulin lispro  0-6 Units Subcutaneous Q6H    midazolam  2 mg Intravenous Once    sucralfate  1 g Oral 4 times per day    meropenem  1 g Intravenous Q8H    pantoprazole  40 mg Intravenous BID    And    sodium chloride (PF)  10 mL Intravenous BID    metoclopramide  10 mg Intravenous Q6H    bisacodyl  10 mg Rectal Daily    lidocaine 1 % injection  5 mL Intradermal Once    sodium chloride flush  10 mL Intravenous 2 times per day    traZODone  100 mg Oral Nightly    magnesium citrate  296 mL Oral Once    clonazePAM  0.5 mg Oral BID    [Held by provider] amitriptyline  10 mg Oral TID    [Held by provider] amLODIPine  10 mg Oral Nightly    sodium chloride flush  10 mL Intravenous 2 times per day    busPIRone  20 mg Oral TID    [Held by provider] metoprolol succinate  25 mg Oral Nightly    nicotine  1 patch Transdermal Daily    sodium chloride (PF)  10 mL Intravenous Daily    escitalopram  20 mg Oral Daily     Continuous Infusions:   PN-Adult 2-in-1 Central Line (Standard) 65 mL/hr at 03/26/20 1713    fentaNYL 126 mcg/hr (03/27/20 0900)    dextrose      sodium chloride 10 mL/hr at 03/27/20 0900    norepinephrine 8 mcg/min (03/27/20 1405)    midazolam 4 mg/hr (03/27/20 1427)     PRN Meds:   artificial tears, , PRN  acetaminophen, 650 mg, Q4H PRN

## 2020-03-27 NOTE — ANESTHESIA PRE PROCEDURE
chewable tablet Take 1 tablet by mouth 3 times daily as needed for Heartburn    Historical Provider, MD   metoprolol succinate (TOPROL XL) 25 MG extended release tablet take 1 tablet by mouth once daily 9/30/17   Historical Provider, MD   gabapentin (NEURONTIN) 300 MG capsule Take 300 mg by mouth 3 times daily. Historical Provider, MD   simvastatin (ZOCOR) 40 MG tablet Take 40 mg by mouth nightly. Historical Provider, MD       Current medications:    No current facility-administered medications for this visit. No current outpatient medications on file.      Facility-Administered Medications Ordered in Other Visits   Medication Dose Route Frequency Provider Last Rate Last Dose    [START ON 3/28/2020] albuterol (PROVENTIL) nebulizer solution 2.5 mg  2.5 mg Nebulization BID [facility], DO        mineral oil liquid 45 mL  45 mL Per G Tube Daily [facility], DO        polyethylene glycol (GLYCOLAX) packet 17 g  17 g Per G Tube Daily Teresa South, DO        midodrine (PROAMATINE) tablet 5 mg  5 mg Oral TID WC [facility], DO   5 mg at 03/27/20 0757    heparin (porcine) injection 5,000 Units  5,000 Units Subcutaneous 3 times per day [facility], DO   5,000 Units at 03/26/20 2212    furosemide (LASIX) injection 20 mg  20 mg Intravenous Daily [facility], DO   20 mg at 03/27/20 0840    PN-Adult 2-in-1 Central Line (Standard)   Intravenous Continuous TPN Teresa South, DO 65 mL/hr at 03/26/20 1713      ipratropium-albuterol (DUONEB) nebulizer solution 1 ampule  1 ampule Inhalation 4x daily [facility], DO   1 ampule at 03/27/20 1234    labetalol (NORMODYNE;TRANDATE) injection 10 mg  10 mg Intravenous Once Teresa South, DO        gabapentin (NEURONTIN) 250 MG/5ML solution 300 mg  300 mg Oral 3 times per day Teresa South, DO   300 mg at 03/26/20 2211    lubrifresh P.M. (artificial tears) ophthalmic ointment   Both Eyes PRN [facility], DO        fentaNYL 20 mcg/mL Infusion  25 mcg/hr Intravenous Continuous Teresa South, DO 6.3 mL/hr at 03/27/20 0900 126 mcg/hr at 03/27/20 0900    acetaminophen (TYLENOL) tablet 650 mg  650 mg Oral Q4H PRN Jojo Paz, DO   650 mg at 03/26/20 1255    Or    acetaminophen (TYLENOL) suppository 650 mg  650 mg Rectal Q6H PRN Jojo Paz, DO   650 mg at 03/27/20 0756    albuterol (PROVENTIL) nebulizer solution 2.5 mg  2.5 mg Nebulization Q6H PRN Jojo Paz, DO        fat emulsion 20 % infusion 100 mL  100 mL Intravenous Daily Jojo Paz, DO   Stopped at 03/27/20 0825    glucose (GLUTOSE) 40 % oral gel 15 g  15 g Oral PRN Jojo Paz, DO        dextrose 50 % IV solution  12.5 g Intravenous PRN Jojo Paz, DO        glucagon (rDNA) injection 1 mg  1 mg Intramuscular PRN Jojo Paz, DO        dextrose 5 % solution  100 mL/hr Intravenous PRN Jojo Paz, DO        insulin lispro (HUMALOG) injection vial 0-6 Units  0-6 Units Subcutaneous Q6H Jojo Paz, DO   1 Units at 03/25/20 0502    midazolam (VERSED) injection 2 mg  2 mg Intravenous Once Jojo Paz, DO        sucralfate (CARAFATE) tablet 1 g  1 g Oral 4 times per day Jojo Paz, DO   1 g at 03/26/20 2237    meropenem (MERREM) 1 g in sodium chloride 0.9 % 100 mL IVPB (mini-bag)  1 g Intravenous Q8H Jojo Paz, DO   Stopped at 03/27/20 0825    0.9 % sodium chloride infusion   Intravenous Continuous Jojo Paz, DO 10 mL/hr at 03/27/20 0900      norepinephrine (LEVOPHED) 16 mg in dextrose 5% 250 mL infusion  2 mcg/min Intravenous Continuous Jojo Paz, DO 2.8 mL/hr at 03/27/20 0900 2.987 mcg/min at 03/27/20 0900    midazolam (VERSED) 100 mg in dextrose 5% 100 mL infusion  1 mg/hr Intravenous Continuous Jojo Paz, DO 7 mL/hr at 03/27/20 0900 7 mg/hr at 03/27/20 0900    pantoprazole (PROTONIX) injection 40 mg  40 mg Intravenous BID Jojo Paz, DO   40 mg at 03/27/20 0840    And    sodium chloride (PF) 0.9 % injection 10 mL  10 mL Intravenous BID Jojo Paz DO   10 mL at 03/26/20 2100    LORazepam (ATIVAN) injection 1 mg  1 mg Intravenous Q6H PRN Janann Older, DO   1 mg at 03/20/20 1330    dextromethorphan-guaiFENesin (ROBITUSSIN-DM)  MG/5ML liquid 10 mL  10 mL Oral Q4H PRN Janann Older, DO   10 mL at 03/13/20 2326    metoclopramide (REGLAN) injection 10 mg  10 mg Intravenous Q6H Janann Older, DO   10 mg at 03/27/20 0517    bisacodyl (DULCOLAX) suppository 10 mg  10 mg Rectal Daily Janann Older, DO   10 mg at 03/26/20 0826    lidocaine 1 % injection 5 mL  5 mL Intradermal Once Janann Older, DO        sodium chloride flush 0.9 % injection 10 mL  10 mL Intravenous 2 times per day Janann Older, DO   10 mL at 03/26/20 2100    sodium chloride flush 0.9 % injection 10 mL  10 mL Intravenous PRN Janann Older, DO        potassium chloride (KLOR-CON M) extended release tablet 40 mEq  40 mEq Oral PRN Janann Older, DO        Or    potassium bicarb-citric acid (EFFER-K) effervescent tablet 40 mEq  40 mEq Oral PRN Janann Older, DO        Or    potassium chloride 10 mEq/100 mL IVPB (Peripheral Line)  10 mEq Intravenous PRN Janann Older,  mL/hr at 03/02/20 1302 10 mEq at 03/02/20 1302    traZODone (DESYREL) tablet 100 mg  100 mg Oral Nightly Janann Older, DO   100 mg at 03/26/20 2212    magnesium hydroxide (MILK OF MAGNESIA) 400 MG/5ML suspension 30 mL  30 mL Oral Daily PRN Janann Older, DO   30 mL at 03/25/20 0841    magnesium citrate solution 296 mL  296 mL Oral Once Janann Older, DO   Stopped at 03/01/20 2044    clonazePAM (KLONOPIN) tablet 0.5 mg  0.5 mg Oral BID Janann Older, DO   0.5 mg at 03/26/20 2237    [Held by provider] amitriptyline (ELAVIL) tablet 10 mg  10 mg Oral TID Janann Older, DO   10 mg at 03/10/20 1341    [Held by provider] amLODIPine (NORVASC) tablet 10 mg  10 mg Oral Nightly Janann Older, DO        sodium chloride flush 0.9 % injection 10 mL  10 mL Intravenous 2 times per day Shani Loja DO   10 mL at 03/26/20 2100    sodium chloride flush 0.9 % injection 10 mL  10 mL Intravenous PRN Shani Loja DO        glycerin moisturizing mouth spray (OASIS) 35 % 2 spray  2 spray Mouth/Throat PRN Windber Banana, DO   2 spray at 02/29/20 1450    busPIRone (BUSPAR) tablet 20 mg  20 mg Oral TID Alvaro Banana, DO   20 mg at 03/26/20 2200    [Held by provider] metoprolol succinate (TOPROL XL) extended release tablet 25 mg  25 mg Oral Nightly Alvaro Banana, DO   25 mg at 03/13/20 2054    nicotine (NICODERM CQ) 14 MG/24HR 1 patch  1 patch Transdermal Daily Windber Banana, DO   1 patch at 03/27/20 0850    sodium chloride (PF) 0.9 % injection 10 mL  10 mL Intravenous Daily Windber Banana, DO   10 mL at 03/26/20 0715    metoprolol (LOPRESSOR) injection 5 mg  5 mg Intravenous Q6H PRN Alvaro Banana, DO   5 mg at 03/24/20 1807    escitalopram (LEXAPRO) tablet 20 mg  20 mg Oral Daily Windber Banana, DO   20 mg at 03/26/20 0826    ondansetron (ZOFRAN) injection 4 mg  4 mg Intravenous Q6H PRN Avlaro Banana, DO   4 mg at 03/20/20 9796       Allergies:     Allergies   Allergen Reactions    Prednisone Anxiety    Compazine [Prochlorperazine Maleate]     Penicillin V Potassium     Penicillins Other (See Comments)     shock       Problem List:    Patient Active Problem List   Diagnosis Code    Frequency of urination R35.0    Severe sepsis ([address] A41.9, R65.20    Back pain M54.9    GERD (gastroesophageal reflux disease) K21.9    MVP (mitral valve prolapse) I34.1    Depression F32.9    Anxiety F41.9    Urine retention R33.9    Acute kidney injury ([address] N17.9    Esophageal dysphagia R13.10    Paraesophageal hernia K44.9    History of repair of hiatal hernia Z98.890, Z87.19    Aspiration pneumonia ([address] J69.0    Respiratory failure ([address] J96.90    Centrilobular emphysema ([address] J43.2    Atelectasis J98.11    Pleural effusion J90    Esophageal dilatation K22.8    Upper GI bleeding K92.2    Acute on chronic blood loss anemia D62    Shock ([address] R57.9    Fever R50.9    Acute postoperative respiratory insufficiency J95.89    Critical illness polyneuropathy ([address] G62.81       Past Medical History: 125/82   02/28/20 139/63       NPO Status:                                                                                 BMI:   Wt Readings from Last 3 Encounters:   03/27/20 203 lb 14.8 oz (92.5 kg)   02/29/20 186 lb 1.1 oz (84.4 kg)   02/24/20 183 lb 6.4 oz (83.2 kg)     There is no height or weight on file to calculate BMI.    CBC:   Lab Results   Component Value Date    WBC 12.3 03/27/2020    RBC 2.62 03/27/2020    HGB 7.7 03/27/2020    HCT 25.4 03/27/2020    MCV 96.9 03/27/2020    RDW 15.5 03/27/2020     03/27/2020       CMP:   Lab Results   Component Value Date     03/27/2020    K 4.8 03/27/2020    CL 97 03/27/2020    CO2 30 03/27/2020    BUN 20 03/27/2020    CREATININE 0.48 03/27/2020    GFRAA >60 03/27/2020    LABGLOM >60 03/27/2020    GLUCOSE 112 03/27/2020    PROT 5.6 03/26/2020    CALCIUM 9.2 03/27/2020    BILITOT 0.29 03/26/2020    ALKPHOS 133 03/26/2020    AST 34 03/26/2020    ALT 25 03/26/2020       POC Tests:   Recent Labs     03/26/20  1115   POCGLU 111*       Coags:   Lab Results   Component Value Date    PROTIME 10.0 03/27/2020    INR 1.0 03/27/2020    APTT 26.2 03/27/2020       HCG (If Applicable): No results found for: PREGTESTUR, PREGSERUM, HCG, HCGQUANT     ABGs: No results found for: PHART, PO2ART, LFX9LUC, OKC3QLB, BEART, I2TLFPQD     Type & Screen (If Applicable):  No results found for: LABABO, [address]    Anesthesia Evaluation  Patient summary reviewed and Nursing notes reviewed no history of anesthetic complications:   Airway: Mallampati: II  TM distance: >3 FB   Neck ROM: full  Mouth opening: > = 3 FB Dental: normal exam   (+) partials  Comment: Very poor dentition     Pulmonary:Negative Pulmonary ROS and normal exam    (+) COPD:                             Cardiovascular:  Exercise tolerance: good (>4 METS),   (+) hypertension:,     (-) past MI, CAD, CABG/stent, dysrhythmias,  angina,  CHF and orthopnea      Rhythm: regular  Rate: normal           Beta Blocker:  Not on Beta Blocker         Neuro/Psych:   (+) neuromuscular disease:, psychiatric history:            GI/Hepatic/Renal:   (+) GERD:, PUD,           Endo/Other:                     Abdominal:           Vascular:                                          Anesthesia Plan      general     ASA 4       Induction: intravenous and inhalational.    MIPS: Postoperative opioids intended and Prophylactic antiemetics administered. Anesthetic plan and risks discussed with healthcare power of .       Plan discussed with CRNA and surgical team.                  Alexandr Patricia MD   3/27/2020

## 2020-03-27 NOTE — PLAN OF CARE
PROVIDE ADEQUATE OXYGENATION WITH ACCEPTABLE SP02/ABG'S    [x]  IDENTIFY APPROPRIATE OXYGEN THERAPY  [x]   MONITOR SP02/ABG'S AS NEEDED   [x]   PATIENT EDUCATION AS NEEDED     Problem: OXYGENATION/RESPIRATORY FUNCTION  Goal: Patient will maintain patent airway  3/26/2020 2007 by Lakeshia Alcantar RCP  Outcome: Ongoing     Problem: OXYGENATION/RESPIRATORY FUNCTION  Goal: Patient will achieve/maintain normal respiratory rate/effort  Description: Respiratory rate and effort will be within normal limits for the patient  3/26/2020 2007 by Lakeshia Alcantar RCP  Outcome: Ongoing     Problem: MECHANICAL VENTILATION  Goal: Patient will maintain patent airway  3/26/2020 2007 by Lakeshia Alcantar RCP  Outcome: Ongoing     Problem: MECHANICAL VENTILATION  Goal: Oral health is maintained or improved  3/26/2020 2007 by Lakeshia Alcantar RCP  Outcome: Ongoing     Problem: MECHANICAL VENTILATION  Goal: ET tube will be managed safely  3/26/2020 2007 by Lakeshia Alcantar RCP  Outcome: Ongoing     Problem: MECHANICAL VENTILATION  Goal: Ability to express needs and understand communication  3/26/2020 2007 by Lakeshia Alcantar RCP  Outcome: Ongoing     Problem: MECHANICAL VENTILATION  Goal: Mobility/activity is maintained at optimum level for patient  3/26/2020 2007 by Lakeshia Alcantar RCP  Outcome: Ongoing    Ventilator Bronchodilator assessment    Breath sounds: rhonchi, diminished  Inspiratory Pressure: 24  Plateau Pressure: 20      Patient assessed at level 2          [x]    Bronchodilator Assessment    BRONCHODILATOR ASSESSMENT SCORE  Score 0 (Home) 1 2 3 4   Breath Sounds   []  Chronic Ventilator: Patient at baseline []  Mild Wheezes/ Clear []  Intermittent wheezes with good air entry [x]  Bilateral/unilateral wheezing with diminished air entry []  Insp/Exp wheeze and/or poor aeration   Ventilator Pressures   []  Chronic Ventilator []  Insp. Pressure less than 25 cm H20 [x]  Insp. Pressure less than 25 cm H20 []  Insp.  Pressure exceeds 25 cm H20 []  Insp.  Pressure exceeds 30 cm H20   Plateau Pressure []  NA   []  Plateau Pressure less than 4  [x]  Plateau Pressure less than or equal to 5 []  Plateau Pressure greater than or equal to 6 []  Plateau Pressure greater than or equal to [facility]  8:09 PM

## 2020-03-27 NOTE — PROGRESS NOTES
[facility]  Occupational Therapy Not Seen Note    DATE: 3/27/2020  Name: Umair Hutchinson  : 1945  MRN: [id number]    Patient not available for Occupational Therapy due to:    [] Testing:    [] Hemodialysis    [] Blood Transfusion in Progress    []Refusal by Patient:    [x] Surgery/Procedure: Trach/PEG    [] Strict Bedrest    [] Sedation    [] Spine Precautions     [] Pt being transferred to palliative care at this time. Spoke with pt/family and OT services to be defered. [] Pt independent with functional mobility and functional tasks.  Pt with no OT acute care needs at this time, will defer OT eval.    [] Other    Next Scheduled Treatment: Ck 3/28    Guy Goins OTR/L

## 2020-03-28 ENCOUNTER — APPOINTMENT (OUTPATIENT)
Dept: GENERAL RADIOLOGY | Age: 75
DRG: 004 | End: 2020-03-28
Attending: INTERNAL MEDICINE
Payer: MEDICARE

## 2020-03-28 LAB
ABSOLUTE EOS #: 0.11 K/UL (ref 0–0.44)
ABSOLUTE IMMATURE GRANULOCYTE: 0.36 K/UL (ref 0–0.3)
ABSOLUTE LYMPH #: 1.49 K/UL (ref 1.1–3.7)
ABSOLUTE MONO #: 0.38 K/UL (ref 0.1–1.2)
ALLEN TEST: ABNORMAL
ANION GAP SERPL CALCULATED.3IONS-SCNC: 10 MMOL/L (ref 9–17)
BASOPHILS # BLD: 0 % (ref 0–2)
BASOPHILS ABSOLUTE: <0.03 K/UL (ref 0–0.2)
BUN BLDV-MCNC: 22 MG/DL (ref 8–23)
BUN/CREAT BLD: ABNORMAL (ref 9–20)
CALCIUM SERPL-MCNC: 9.3 MG/DL (ref 8.6–10.4)
CHLORIDE BLD-SCNC: 98 MMOL/L (ref 98–107)
CO2: 30 MMOL/L (ref 20–31)
CREAT SERPL-MCNC: 0.47 MG/DL (ref 0.5–0.9)
DIFFERENTIAL TYPE: ABNORMAL
EKG ATRIAL RATE: 340 BPM
EKG Q-T INTERVAL: 346 MS
EKG QRS DURATION: 80 MS
EKG QTC CALCULATION (BAZETT): 434 MS
EKG R AXIS: 50 DEGREES
EKG T AXIS: 24 DEGREES
EKG VENTRICULAR RATE: 95 BPM
EOSINOPHILS RELATIVE PERCENT: 1 % (ref 1–4)
FIO2: 45
GFR AFRICAN AMERICAN: >60 ML/MIN
GFR NON-AFRICAN AMERICAN: >60 ML/MIN
GFR SERPL CREATININE-BSD FRML MDRD: ABNORMAL ML/MIN/{1.73_M2}
GFR SERPL CREATININE-BSD FRML MDRD: ABNORMAL ML/MIN/{1.73_M2}
GLUCOSE BLD-MCNC: 113 MG/DL (ref 65–105)
GLUCOSE BLD-MCNC: 127 MG/DL (ref 65–105)
GLUCOSE BLD-MCNC: 136 MG/DL (ref 70–99)
GLUCOSE BLD-MCNC: 142 MG/DL (ref 65–105)
GLUCOSE BLD-MCNC: 144 MG/DL (ref 65–105)
GLUCOSE BLD-MCNC: 148 MG/DL (ref 65–105)
HCT VFR BLD CALC: 23.9 % (ref 36.3–47.1)
HEMOGLOBIN: 7.3 G/DL (ref 11.9–15.1)
IMMATURE GRANULOCYTES: 3 %
LYMPHOCYTES # BLD: 14 % (ref 24–43)
MCH RBC QN AUTO: 29.7 PG (ref 25.2–33.5)
MCHC RBC AUTO-ENTMCNC: 30.5 G/DL (ref 28.4–34.8)
MCV RBC AUTO: 97.2 FL (ref 82.6–102.9)
MODE: ABNORMAL
MONOCYTES # BLD: 4 % (ref 3–12)
NEGATIVE BASE EXCESS, ART: ABNORMAL (ref 0–2)
NRBC AUTOMATED: 0 PER 100 WBC
O2 DEVICE/FLOW/%: ABNORMAL
PATIENT TEMP: ABNORMAL
PDW BLD-RTO: 15.5 % (ref 11.8–14.4)
PLATELET # BLD: 253 K/UL (ref 138–453)
PLATELET ESTIMATE: ABNORMAL
PMV BLD AUTO: 10.9 FL (ref 8.1–13.5)
POC HCO3: 37.8 MMOL/L (ref 21–28)
POC O2 SATURATION: 99 % (ref 94–98)
POC PCO2 TEMP: ABNORMAL MM HG
POC PCO2: 56.1 MM HG (ref 35–48)
POC PH TEMP: ABNORMAL
POC PH: 7.44 (ref 7.35–7.45)
POC PO2 TEMP: ABNORMAL MM HG
POC PO2: 139.7 MM HG (ref 83–108)
POSITIVE BASE EXCESS, ART: 12 (ref 0–3)
POTASSIUM SERPL-SCNC: 4.3 MMOL/L (ref 3.7–5.3)
RBC # BLD: 2.46 M/UL (ref 3.95–5.11)
RBC # BLD: ABNORMAL 10*6/UL
SAMPLE SITE: ABNORMAL
SEG NEUTROPHILS: 78 % (ref 36–65)
SEGMENTED NEUTROPHILS ABSOLUTE COUNT: 8.58 K/UL (ref 1.5–8.1)
SODIUM BLD-SCNC: 138 MMOL/L (ref 135–144)
TCO2 (CALC), ART: 40 MMOL/L (ref 22–29)
WBC # BLD: 10.9 K/UL (ref 3.5–11.3)
WBC # BLD: ABNORMAL 10*3/UL

## 2020-03-28 PROCEDURE — 6370000000 HC RX 637 (ALT 250 FOR IP): Performed by: STUDENT IN AN ORGANIZED HEALTH CARE EDUCATION/TRAINING PROGRAM

## 2020-03-28 PROCEDURE — 2580000003 HC RX 258: Performed by: STUDENT IN AN ORGANIZED HEALTH CARE EDUCATION/TRAINING PROGRAM

## 2020-03-28 PROCEDURE — 94640 AIRWAY INHALATION TREATMENT: CPT

## 2020-03-28 PROCEDURE — 37799 UNLISTED PX VASCULAR SURGERY: CPT

## 2020-03-28 PROCEDURE — 82803 BLOOD GASES ANY COMBINATION: CPT

## 2020-03-28 PROCEDURE — 6360000002 HC RX W HCPCS: Performed by: STUDENT IN AN ORGANIZED HEALTH CARE EDUCATION/TRAINING PROGRAM

## 2020-03-28 PROCEDURE — 93005 ELECTROCARDIOGRAM TRACING: CPT | Performed by: STUDENT IN AN ORGANIZED HEALTH CARE EDUCATION/TRAINING PROGRAM

## 2020-03-28 PROCEDURE — 74018 RADEX ABDOMEN 1 VIEW: CPT

## 2020-03-28 PROCEDURE — 2500000003 HC RX 250 WO HCPCS: Performed by: STUDENT IN AN ORGANIZED HEALTH CARE EDUCATION/TRAINING PROGRAM

## 2020-03-28 PROCEDURE — 2000000000 HC ICU R&B

## 2020-03-28 PROCEDURE — 99291 CRITICAL CARE FIRST HOUR: CPT | Performed by: INTERNAL MEDICINE

## 2020-03-28 PROCEDURE — 93010 ELECTROCARDIOGRAM REPORT: CPT | Performed by: INTERNAL MEDICINE

## 2020-03-28 PROCEDURE — 80048 BASIC METABOLIC PNL TOTAL CA: CPT

## 2020-03-28 PROCEDURE — 2700000000 HC OXYGEN THERAPY PER DAY

## 2020-03-28 PROCEDURE — 85025 COMPLETE CBC W/AUTO DIFF WBC: CPT

## 2020-03-28 PROCEDURE — C9113 INJ PANTOPRAZOLE SODIUM, VIA: HCPCS | Performed by: STUDENT IN AN ORGANIZED HEALTH CARE EDUCATION/TRAINING PROGRAM

## 2020-03-28 PROCEDURE — 94003 VENT MGMT INPAT SUBQ DAY: CPT

## 2020-03-28 PROCEDURE — 94761 N-INVAS EAR/PLS OXIMETRY MLT: CPT

## 2020-03-28 PROCEDURE — 94770 HC ETCO2 MONITOR DAILY: CPT

## 2020-03-28 RX ADMIN — HEPARIN SODIUM 5000 UNITS: 5000 INJECTION INTRAVENOUS; SUBCUTANEOUS at 05:58

## 2020-03-28 RX ADMIN — IPRATROPIUM BROMIDE AND ALBUTEROL SULFATE 1 AMPULE: .5; 3 SOLUTION RESPIRATORY (INHALATION) at 15:29

## 2020-03-28 RX ADMIN — MINERAL OIL 45 ML: 1000 SOLUTION ORAL at 08:35

## 2020-03-28 RX ADMIN — MEROPENEM 1 G: 1 INJECTION, POWDER, FOR SOLUTION INTRAVENOUS at 20:51

## 2020-03-28 RX ADMIN — ESCITALOPRAM OXALATE 20 MG: 10 TABLET ORAL at 08:36

## 2020-03-28 RX ADMIN — ALBUTEROL SULFATE 2.5 MG: 2.5 SOLUTION RESPIRATORY (INHALATION) at 03:44

## 2020-03-28 RX ADMIN — METOCLOPRAMIDE 10 MG: 5 INJECTION, SOLUTION INTRAMUSCULAR; INTRAVENOUS at 05:59

## 2020-03-28 RX ADMIN — BUSPIRONE HYDROCHLORIDE 20 MG: 10 TABLET ORAL at 08:36

## 2020-03-28 RX ADMIN — PANTOPRAZOLE SODIUM 40 MG: 40 INJECTION, POWDER, FOR SOLUTION INTRAVENOUS at 22:24

## 2020-03-28 RX ADMIN — ALBUTEROL SULFATE 2.5 MG: 2.5 SOLUTION RESPIRATORY (INHALATION) at 23:18

## 2020-03-28 RX ADMIN — Medication 5 MG/HR: at 06:26

## 2020-03-28 RX ADMIN — BISACODYL 10 MG: 10 SUPPOSITORY RECTAL at 09:00

## 2020-03-28 RX ADMIN — MEROPENEM 1 G: 1 INJECTION, POWDER, FOR SOLUTION INTRAVENOUS at 11:40

## 2020-03-28 RX ADMIN — SODIUM CHLORIDE, PRESERVATIVE FREE 10 ML: 5 INJECTION INTRAVENOUS at 20:51

## 2020-03-28 RX ADMIN — SUCRALFATE 1 G: 1 TABLET ORAL at 05:59

## 2020-03-28 RX ADMIN — INSULIN LISPRO 1 UNITS: 100 INJECTION, SOLUTION INTRAVENOUS; SUBCUTANEOUS at 11:51

## 2020-03-28 RX ADMIN — INSULIN LISPRO 1 UNITS: 100 INJECTION, SOLUTION INTRAVENOUS; SUBCUTANEOUS at 05:59

## 2020-03-28 RX ADMIN — Medication 10 ML: at 22:24

## 2020-03-28 RX ADMIN — HEPARIN SODIUM 5000 UNITS: 5000 INJECTION INTRAVENOUS; SUBCUTANEOUS at 22:24

## 2020-03-28 RX ADMIN — METOCLOPRAMIDE 10 MG: 5 INJECTION, SOLUTION INTRAMUSCULAR; INTRAVENOUS at 11:29

## 2020-03-28 RX ADMIN — SUCRALFATE 1 G: 1 TABLET ORAL at 17:20

## 2020-03-28 RX ADMIN — SUCRALFATE 1 G: 1 TABLET ORAL at 11:40

## 2020-03-28 RX ADMIN — I.V. FAT EMULSION 100 ML: 20 EMULSION INTRAVENOUS at 17:41

## 2020-03-28 RX ADMIN — GABAPENTIN 300 MG: 250 SOLUTION ORAL at 13:27

## 2020-03-28 RX ADMIN — MIDODRINE HYDROCHLORIDE 5 MG: 5 TABLET ORAL at 08:36

## 2020-03-28 RX ADMIN — LORAZEPAM 1 MG: 2 INJECTION INTRAMUSCULAR at 11:31

## 2020-03-28 RX ADMIN — IPRATROPIUM BROMIDE AND ALBUTEROL SULFATE 1 AMPULE: .5; 3 SOLUTION RESPIRATORY (INHALATION) at 20:40

## 2020-03-28 RX ADMIN — IPRATROPIUM BROMIDE AND ALBUTEROL SULFATE 1 AMPULE: .5; 3 SOLUTION RESPIRATORY (INHALATION) at 08:33

## 2020-03-28 RX ADMIN — DEXMEDETOMIDINE HYDROCHLORIDE 0.2 MCG/KG/HR: 100 INJECTION, SOLUTION INTRAVENOUS at 11:25

## 2020-03-28 RX ADMIN — CLONAZEPAM 0.5 MG: 0.5 TABLET ORAL at 09:27

## 2020-03-28 RX ADMIN — POTASSIUM CHLORIDE: 2 INJECTION, SOLUTION, CONCENTRATE INTRAVENOUS at 17:41

## 2020-03-28 RX ADMIN — IPRATROPIUM BROMIDE AND ALBUTEROL SULFATE 1 AMPULE: .5; 3 SOLUTION RESPIRATORY (INHALATION) at 11:36

## 2020-03-28 RX ADMIN — HEPARIN SODIUM 5000 UNITS: 5000 INJECTION INTRAVENOUS; SUBCUTANEOUS at 13:27

## 2020-03-28 RX ADMIN — BUSPIRONE HYDROCHLORIDE 20 MG: 10 TABLET ORAL at 13:27

## 2020-03-28 RX ADMIN — INSULIN LISPRO 1 UNITS: 100 INJECTION, SOLUTION INTRAVENOUS; SUBCUTANEOUS at 17:28

## 2020-03-28 RX ADMIN — GABAPENTIN 300 MG: 250 SOLUTION ORAL at 05:59

## 2020-03-28 RX ADMIN — METOCLOPRAMIDE 10 MG: 5 INJECTION, SOLUTION INTRAMUSCULAR; INTRAVENOUS at 17:20

## 2020-03-28 RX ADMIN — FUROSEMIDE 20 MG: 10 INJECTION, SOLUTION INTRAMUSCULAR; INTRAVENOUS at 08:36

## 2020-03-28 RX ADMIN — DOCUSATE SODIUM 100 MG: 50 LIQUID ORAL at 09:27

## 2020-03-28 RX ADMIN — MIDODRINE HYDROCHLORIDE 5 MG: 5 TABLET ORAL at 17:19

## 2020-03-28 RX ADMIN — PANTOPRAZOLE SODIUM 40 MG: 40 INJECTION, POWDER, FOR SOLUTION INTRAVENOUS at 08:36

## 2020-03-28 RX ADMIN — METOCLOPRAMIDE 10 MG: 5 INJECTION, SOLUTION INTRAMUSCULAR; INTRAVENOUS at 23:30

## 2020-03-28 RX ADMIN — METOPROLOL TARTRATE 5 MG: 5 INJECTION, SOLUTION INTRAVENOUS at 04:57

## 2020-03-28 RX ADMIN — MEROPENEM 1 G: 1 INJECTION, POWDER, FOR SOLUTION INTRAVENOUS at 04:42

## 2020-03-28 RX ADMIN — POLYETHYLENE GLYCOL 3350 17 G: 17 POWDER, FOR SOLUTION ORAL at 09:27

## 2020-03-28 RX ADMIN — MIDODRINE HYDROCHLORIDE 5 MG: 5 TABLET ORAL at 11:40

## 2020-03-28 RX ADMIN — ACETAMINOPHEN 650 MG: 325 TABLET ORAL at 11:30

## 2020-03-28 RX ADMIN — Medication 10 ML: at 08:37

## 2020-03-28 ASSESSMENT — PULMONARY FUNCTION TESTS
PIF_VALUE: 18
PIF_VALUE: 9
PIF_VALUE: 6
PIF_VALUE: 17
PIF_VALUE: 18
PIF_VALUE: 24
PIF_VALUE: 9
PIF_VALUE: 17
PIF_VALUE: 17
PIF_VALUE: 9
PIF_VALUE: 15
PIF_VALUE: 11
PIF_VALUE: 18
PIF_VALUE: 17
PIF_VALUE: 17
PIF_VALUE: 8

## 2020-03-28 ASSESSMENT — PAIN SCALES - GENERAL
PAINLEVEL_OUTOF10: 2
PAINLEVEL_OUTOF10: 0

## 2020-03-28 NOTE — PROGRESS NOTES
Respiratory Therapist went to pt bedside to perform day 1 trach care of changing the inner cannula of the trach. Rt found no inner cannula, RT paged general surgery to bedside for the pt's trach. The trach in place was not a recognized type as it has no inner cannula. On flexible trach plate trach is labeld as SHILEY 7.0 SCT upon further research SCT stands for single cannula trach. Rt asked gen surg resident for more of this type of trach at bedside in case of dislodgement or plugging as there is no inner cannulas to change or clean. Rt also mentioned a trach with no inner cannula should probably be changed out at least once a week to help prevent bacterial growth, infections and plugging of the trach.

## 2020-03-28 NOTE — PROGRESS NOTES
Trach care done without incident. No inner cannula for this type trach. No oozing at stoma. No redness noted. Dr. Dominic Stovall aware of trach type and concerns.

## 2020-03-28 NOTE — PROGRESS NOTES
Critical Care Team - Daily Progress Note      Date and time: 3/28/2020 8:28 AM  Patient's name:  Nelson Reilly  Medical Record Number: [id number]  Patient's account/billing number: [de-identified]  Patient's YOB: 1945  Age: 76 y.o. Date of Admission: 2/29/2020 10:37 AM  Length of stay during current admission: 28  Primary Care Physician: Jean Calvin MD  ICU Attending Physician: Dr. Leland Marroquin Status: Full Code    Reason for ICU admission: No chief complaint on file. Subjective:   OVERNIGHT EVENTS: Overnight patient was hypertensive she was given a dose of Lopressor few hours later patient became hypotensive and patient was started on Levophed 10. She got trach and PEG tube yesterday. She is still intubated on PRVC mode 28/350/6/40, she is breathing over the vent with RR 40. ABG this morning 7.43/56/99/37. Feeding Diet: TPN 65, PEG tube feeding not yet started. Fluids: Rossana Spikes  Family: uptodate with current treatment plan   Analgesic: fentanyl 125  Sedation: versed 5   Thrombo-prophylaxis: EPC Cuffs, heparin subcu 5000 unit. Mobility: none  Heads up: 30  Ulcer prophylaxis: Protonix 40 twice daily, Carafate 1 g every 6 hour, Reglan 10 every 6 hour. Glycemic control: good  Spontaneous breathing trial: not tolerating    Bowel regimen/urine output: NO BM/24 hours in spite of getting suppositories enemas and mineral oil. 2.7 L UOP/24H, No OG OP , + fluid balance of 786. Indwelling catheter/lines: chu catheter/ right PICC line/ Left art line  De-escalation: We will start the patient on PEG tube feeding later today.  Will wean fentanyl and start on precedex     AWAKE & FOLLOWING COMMANDS:  [x] No   [] Yes    CURRENT VENTILATION STATUS:     [x] Ventilator  [] BIPAP  [] Nasal Cannula [] Room Air      IF INTUBATED, ET TUBE MARKING AT LOWER LIP:  24cms    SECRETIONS Amount:  [x] Small [] Moderate  [] Large  [] None  Color:     [x] White [] Colored  [] Bloody    SEDATION:  RAAS Score:  []

## 2020-03-28 NOTE — PROGRESS NOTES
Ventilator Bronchodilator assessment    Breath sounds: clear  Inspiratory Pressure: 18  Plateau Pressure: 19    Patient assessed at level 1  High PaCo2 on abgs. High HCO3 on abgs. []    Bronchodilator Assessment    BRONCHODILATOR ASSESSMENT SCORE  Score 0 (Home) 1 2 3 4   Breath Sounds   []  Chronic Ventilator: Patient at baseline [x]  Mild Wheezes/ Clear [x]  Intermittent wheezes with good air entry []  Bilateral/unilateral wheezing with diminished air entry []  Insp/Exp wheeze and/or poor aeration   Ventilator Pressures   []  Chronic Ventilator [x]  Insp. Pressure less than 25 cm H20 [x]  Insp. Pressure less than 25 cm H20 []  Insp. Pressure exceeds 25 cm H20 []  Insp.  Pressure exceeds 30 cm H20   Plateau Pressure []  NA   [x]  Plateau Pressure less than 4  [x]  Plateau Pressure less than or equal to 5 []  Plateau Pressure greater than or equal to 6 []  Plateau Pressure greater than or equal to [place]  8:31 AM

## 2020-03-28 NOTE — PLAN OF CARE
Problem: Pain:  Goal: Pain level will decrease  Description: Pain level will decrease  Outcome: Ongoing  Goal: Control of acute pain  Description: Control of acute pain  Outcome: Ongoing  Goal: Control of chronic pain  Description: Control of chronic pain  Outcome: Ongoing     Problem: Discharge Planning:  Goal: Participates in care planning  Description: Participates in care planning  Outcome: Ongoing  Goal: Discharged to appropriate level of care  Description: Discharged to appropriate level of care  Outcome: Ongoing     Problem: Anxiety/Stress:  Goal: Level of anxiety will decrease  Description: Level of anxiety will decrease  Outcome: Ongoing     Problem: Bowel Function - Altered:  Goal: Bowel elimination is within specified parameters  Description: Bowel elimination is within specified parameters  Outcome: Ongoing     Problem: Nutrition Deficit:  Goal: Ability to achieve adequate nutritional intake will improve  Description: Ability to achieve adequate nutritional intake will improve  Outcome: Ongoing     Problem: Pain:  Goal: Recognizes and communicates pain  Description: Recognizes and communicates pain  Outcome: Ongoing  Goal: Control of acute pain  Description: Control of acute pain  Outcome: Ongoing  Goal: Control of chronic pain  Description: Control of chronic pain  Outcome: Ongoing     Problem: Skin Integrity - Impaired:  Goal: Will show no infection signs and symptoms  Description: Will show no infection signs and symptoms  Outcome: Ongoing  Goal: Absence of new skin breakdown  Description: Absence of new skin breakdown  Outcome: Ongoing     Problem: Sleep Pattern Disturbance:  Goal: Appears well-rested  Description: Appears well-rested  Outcome: Ongoing     Problem: Risk for Impaired Skin Integrity  Goal: Tissue integrity - skin and mucous membranes  Description: Structural intactness and normal physiological function of skin and  mucous membranes.   Outcome: Ongoing     Problem: Falls - Risk

## 2020-03-29 LAB
ABSOLUTE EOS #: 0.5 K/UL (ref 0–0.4)
ABSOLUTE IMMATURE GRANULOCYTE: 0.38 K/UL (ref 0–0.3)
ABSOLUTE LYMPH #: 3.63 K/UL (ref 1–4.8)
ABSOLUTE MONO #: 0.75 K/UL (ref 0.1–0.8)
ALLEN TEST: ABNORMAL
ANION GAP SERPL CALCULATED.3IONS-SCNC: 11 MMOL/L (ref 9–17)
BASOPHILS # BLD: 1 % (ref 0–2)
BASOPHILS ABSOLUTE: 0.13 K/UL (ref 0–0.2)
BUN BLDV-MCNC: 22 MG/DL (ref 8–23)
BUN/CREAT BLD: ABNORMAL (ref 9–20)
CALCIUM SERPL-MCNC: 9.3 MG/DL (ref 8.6–10.4)
CHLORIDE BLD-SCNC: 98 MMOL/L (ref 98–107)
CO2: 29 MMOL/L (ref 20–31)
CREAT SERPL-MCNC: 0.44 MG/DL (ref 0.5–0.9)
DIFFERENTIAL TYPE: ABNORMAL
EOSINOPHILS RELATIVE PERCENT: 4 % (ref 1–4)
FIO2: 35
GFR AFRICAN AMERICAN: >60 ML/MIN
GFR NON-AFRICAN AMERICAN: >60 ML/MIN
GFR SERPL CREATININE-BSD FRML MDRD: ABNORMAL ML/MIN/{1.73_M2}
GFR SERPL CREATININE-BSD FRML MDRD: ABNORMAL ML/MIN/{1.73_M2}
GLUCOSE BLD-MCNC: 125 MG/DL (ref 65–105)
GLUCOSE BLD-MCNC: 130 MG/DL (ref 70–99)
GLUCOSE BLD-MCNC: 134 MG/DL (ref 65–105)
GLUCOSE BLD-MCNC: 137 MG/DL (ref 65–105)
HCT VFR BLD CALC: 25.5 % (ref 36.3–47.1)
HEMOGLOBIN: 7.7 G/DL (ref 11.9–15.1)
IMMATURE GRANULOCYTES: 3 %
LYMPHOCYTES # BLD: 29 % (ref 24–44)
MCH RBC QN AUTO: 29.1 PG (ref 25.2–33.5)
MCHC RBC AUTO-ENTMCNC: 30.2 G/DL (ref 28.4–34.8)
MCV RBC AUTO: 96.2 FL (ref 82.6–102.9)
MODE: ABNORMAL
MONOCYTES # BLD: 6 % (ref 1–7)
MORPHOLOGY: ABNORMAL
NEGATIVE BASE EXCESS, ART: ABNORMAL (ref 0–2)
NRBC AUTOMATED: 0.3 PER 100 WBC
O2 DEVICE/FLOW/%: ABNORMAL
PATIENT TEMP: 38.3
PDW BLD-RTO: 15.4 % (ref 11.8–14.4)
PLATELET # BLD: 319 K/UL (ref 138–453)
PLATELET ESTIMATE: ABNORMAL
PMV BLD AUTO: 10.8 FL (ref 8.1–13.5)
POC HCO3: 36.9 MMOL/L (ref 21–28)
POC O2 SATURATION: 94 % (ref 94–98)
POC PCO2 TEMP: 61 MM HG
POC PCO2: 57.5 MM HG (ref 35–48)
POC PH TEMP: 7.4
POC PH: 7.42 (ref 7.35–7.45)
POC PO2 TEMP: 80 MM HG
POC PO2: 73.5 MM HG (ref 83–108)
POSITIVE BASE EXCESS, ART: 11 (ref 0–3)
POTASSIUM SERPL-SCNC: 4.2 MMOL/L (ref 3.7–5.3)
RBC # BLD: 2.65 M/UL (ref 3.95–5.11)
RBC # BLD: ABNORMAL 10*6/UL
SAMPLE SITE: ABNORMAL
SEG NEUTROPHILS: 57 % (ref 36–66)
SEGMENTED NEUTROPHILS ABSOLUTE COUNT: 7.11 K/UL (ref 1.8–7.7)
SODIUM BLD-SCNC: 138 MMOL/L (ref 135–144)
TCO2 (CALC), ART: 39 MMOL/L (ref 22–29)
WBC # BLD: 12.5 K/UL (ref 3.5–11.3)
WBC # BLD: ABNORMAL 10*3/UL

## 2020-03-29 PROCEDURE — 82947 ASSAY GLUCOSE BLOOD QUANT: CPT

## 2020-03-29 PROCEDURE — 37799 UNLISTED PX VASCULAR SURGERY: CPT

## 2020-03-29 PROCEDURE — 80048 BASIC METABOLIC PNL TOTAL CA: CPT

## 2020-03-29 PROCEDURE — 2500000003 HC RX 250 WO HCPCS: Performed by: STUDENT IN AN ORGANIZED HEALTH CARE EDUCATION/TRAINING PROGRAM

## 2020-03-29 PROCEDURE — 6360000002 HC RX W HCPCS: Performed by: STUDENT IN AN ORGANIZED HEALTH CARE EDUCATION/TRAINING PROGRAM

## 2020-03-29 PROCEDURE — 6360000002 HC RX W HCPCS: Performed by: EMERGENCY MEDICINE

## 2020-03-29 PROCEDURE — 99232 SBSQ HOSP IP/OBS MODERATE 35: CPT | Performed by: INTERNAL MEDICINE

## 2020-03-29 PROCEDURE — 6370000000 HC RX 637 (ALT 250 FOR IP): Performed by: STUDENT IN AN ORGANIZED HEALTH CARE EDUCATION/TRAINING PROGRAM

## 2020-03-29 PROCEDURE — 99291 CRITICAL CARE FIRST HOUR: CPT | Performed by: INTERNAL MEDICINE

## 2020-03-29 PROCEDURE — 6360000002 HC RX W HCPCS

## 2020-03-29 PROCEDURE — 94003 VENT MGMT INPAT SUBQ DAY: CPT

## 2020-03-29 PROCEDURE — 94761 N-INVAS EAR/PLS OXIMETRY MLT: CPT

## 2020-03-29 PROCEDURE — 2580000003 HC RX 258: Performed by: STUDENT IN AN ORGANIZED HEALTH CARE EDUCATION/TRAINING PROGRAM

## 2020-03-29 PROCEDURE — 85025 COMPLETE CBC W/AUTO DIFF WBC: CPT

## 2020-03-29 PROCEDURE — 2000000000 HC ICU R&B

## 2020-03-29 PROCEDURE — 2700000000 HC OXYGEN THERAPY PER DAY

## 2020-03-29 PROCEDURE — C9113 INJ PANTOPRAZOLE SODIUM, VIA: HCPCS | Performed by: STUDENT IN AN ORGANIZED HEALTH CARE EDUCATION/TRAINING PROGRAM

## 2020-03-29 PROCEDURE — 2500000003 HC RX 250 WO HCPCS: Performed by: EMERGENCY MEDICINE

## 2020-03-29 PROCEDURE — 94770 HC ETCO2 MONITOR DAILY: CPT

## 2020-03-29 PROCEDURE — 94640 AIRWAY INHALATION TREATMENT: CPT

## 2020-03-29 PROCEDURE — 82803 BLOOD GASES ANY COMBINATION: CPT

## 2020-03-29 RX ORDER — FENTANYL CITRATE 50 UG/ML
100 INJECTION, SOLUTION INTRAMUSCULAR; INTRAVENOUS
Status: DISCONTINUED | OUTPATIENT
Start: 2020-03-29 | End: 2020-04-04

## 2020-03-29 RX ORDER — FENTANYL CITRATE 50 UG/ML
100 INJECTION, SOLUTION INTRAMUSCULAR; INTRAVENOUS
Status: DISCONTINUED | OUTPATIENT
Start: 2020-03-29 | End: 2020-03-29

## 2020-03-29 RX ORDER — VECURONIUM BROMIDE 1 MG/ML
1 INJECTION, POWDER, LYOPHILIZED, FOR SOLUTION INTRAVENOUS ONCE
Status: DISCONTINUED | OUTPATIENT
Start: 2020-03-29 | End: 2020-03-29 | Stop reason: DRUGHIGH

## 2020-03-29 RX ORDER — FENTANYL CITRATE 50 UG/ML
INJECTION, SOLUTION INTRAMUSCULAR; INTRAVENOUS
Status: COMPLETED
Start: 2020-03-29 | End: 2020-03-29

## 2020-03-29 RX ORDER — VECURONIUM BROMIDE 1 MG/ML
2 INJECTION, POWDER, LYOPHILIZED, FOR SOLUTION INTRAVENOUS ONCE
Status: DISCONTINUED | OUTPATIENT
Start: 2020-03-29 | End: 2020-03-29

## 2020-03-29 RX ORDER — FENTANYL CITRATE 50 UG/ML
75 INJECTION, SOLUTION INTRAMUSCULAR; INTRAVENOUS
Status: DISCONTINUED | OUTPATIENT
Start: 2020-03-29 | End: 2020-04-04

## 2020-03-29 RX ORDER — HYDRALAZINE HYDROCHLORIDE 20 MG/ML
5 INJECTION INTRAMUSCULAR; INTRAVENOUS ONCE
Status: COMPLETED | OUTPATIENT
Start: 2020-03-29 | End: 2020-03-29

## 2020-03-29 RX ORDER — VECURONIUM BROMIDE 1 MG/ML
9 INJECTION, POWDER, LYOPHILIZED, FOR SOLUTION INTRAVENOUS ONCE
Status: COMPLETED | OUTPATIENT
Start: 2020-03-30 | End: 2020-03-30

## 2020-03-29 RX ORDER — FENTANYL CITRATE 50 UG/ML
75 INJECTION, SOLUTION INTRAMUSCULAR; INTRAVENOUS
Status: DISCONTINUED | OUTPATIENT
Start: 2020-03-29 | End: 2020-03-29

## 2020-03-29 RX ORDER — VECURONIUM BROMIDE 1 MG/ML
9 INJECTION, POWDER, LYOPHILIZED, FOR SOLUTION INTRAVENOUS ONCE
Status: COMPLETED | OUTPATIENT
Start: 2020-03-29 | End: 2020-03-29

## 2020-03-29 RX ADMIN — IPRATROPIUM BROMIDE AND ALBUTEROL SULFATE 1 AMPULE: .5; 3 SOLUTION RESPIRATORY (INHALATION) at 11:34

## 2020-03-29 RX ADMIN — VECURONIUM BROMIDE 9 MG: 1 INJECTION, POWDER, LYOPHILIZED, FOR SOLUTION INTRAVENOUS at 04:47

## 2020-03-29 RX ADMIN — ACETAMINOPHEN 650 MG: 650 SUPPOSITORY RECTAL at 03:38

## 2020-03-29 RX ADMIN — MINERAL OIL 45 ML: 1000 SOLUTION ORAL at 08:05

## 2020-03-29 RX ADMIN — CLONAZEPAM 0.5 MG: 0.5 TABLET ORAL at 20:31

## 2020-03-29 RX ADMIN — MEROPENEM 1 G: 1 INJECTION, POWDER, FOR SOLUTION INTRAVENOUS at 05:11

## 2020-03-29 RX ADMIN — PANTOPRAZOLE SODIUM 40 MG: 40 INJECTION, POWDER, FOR SOLUTION INTRAVENOUS at 20:32

## 2020-03-29 RX ADMIN — CLONAZEPAM 0.5 MG: 0.5 TABLET ORAL at 08:05

## 2020-03-29 RX ADMIN — DOCUSATE SODIUM 100 MG: 50 LIQUID ORAL at 08:05

## 2020-03-29 RX ADMIN — INSULIN LISPRO 1 UNITS: 100 INJECTION, SOLUTION INTRAVENOUS; SUBCUTANEOUS at 23:27

## 2020-03-29 RX ADMIN — DEXMEDETOMIDINE HYDROCHLORIDE 0.2 MCG/KG/HR: 100 INJECTION, SOLUTION INTRAVENOUS at 08:16

## 2020-03-29 RX ADMIN — MEROPENEM 1 G: 1 INJECTION, POWDER, FOR SOLUTION INTRAVENOUS at 11:33

## 2020-03-29 RX ADMIN — SUCRALFATE 1 G: 1 TABLET ORAL at 23:29

## 2020-03-29 RX ADMIN — METOCLOPRAMIDE 10 MG: 5 INJECTION, SOLUTION INTRAMUSCULAR; INTRAVENOUS at 05:11

## 2020-03-29 RX ADMIN — SODIUM CHLORIDE, PRESERVATIVE FREE 10 ML: 5 INJECTION INTRAVENOUS at 20:32

## 2020-03-29 RX ADMIN — IPRATROPIUM BROMIDE AND ALBUTEROL SULFATE 1 AMPULE: .5; 3 SOLUTION RESPIRATORY (INHALATION) at 20:10

## 2020-03-29 RX ADMIN — DEXMEDETOMIDINE HYDROCHLORIDE 0.7 MCG/KG/HR: 100 INJECTION, SOLUTION INTRAVENOUS at 17:52

## 2020-03-29 RX ADMIN — FENTANYL CITRATE 100 MCG: 50 INJECTION, SOLUTION INTRAMUSCULAR; INTRAVENOUS at 04:22

## 2020-03-29 RX ADMIN — BISACODYL 10 MG: 10 SUPPOSITORY RECTAL at 08:05

## 2020-03-29 RX ADMIN — LORAZEPAM 1 MG: 2 INJECTION INTRAMUSCULAR at 08:30

## 2020-03-29 RX ADMIN — METOCLOPRAMIDE 10 MG: 5 INJECTION, SOLUTION INTRAMUSCULAR; INTRAVENOUS at 23:29

## 2020-03-29 RX ADMIN — HEPARIN SODIUM 5000 UNITS: 5000 INJECTION INTRAVENOUS; SUBCUTANEOUS at 05:11

## 2020-03-29 RX ADMIN — Medication 10 ML: at 20:32

## 2020-03-29 RX ADMIN — HEPARIN SODIUM 5000 UNITS: 5000 INJECTION INTRAVENOUS; SUBCUTANEOUS at 14:44

## 2020-03-29 RX ADMIN — SUCRALFATE 1 G: 1 TABLET ORAL at 16:45

## 2020-03-29 RX ADMIN — BUSPIRONE HYDROCHLORIDE 20 MG: 10 TABLET ORAL at 14:44

## 2020-03-29 RX ADMIN — LORAZEPAM 1 MG: 2 INJECTION INTRAMUSCULAR at 03:40

## 2020-03-29 RX ADMIN — Medication 5 MG/HR: at 00:47

## 2020-03-29 RX ADMIN — MIDODRINE HYDROCHLORIDE 5 MG: 5 TABLET ORAL at 11:34

## 2020-03-29 RX ADMIN — FENTANYL CITRATE 100 MCG: 50 INJECTION, SOLUTION INTRAMUSCULAR; INTRAVENOUS at 16:08

## 2020-03-29 RX ADMIN — HEPARIN SODIUM 5000 UNITS: 5000 INJECTION INTRAVENOUS; SUBCUTANEOUS at 21:53

## 2020-03-29 RX ADMIN — ALBUTEROL SULFATE 2.5 MG: 2.5 SOLUTION RESPIRATORY (INHALATION) at 03:31

## 2020-03-29 RX ADMIN — POTASSIUM CHLORIDE: 2 INJECTION, SOLUTION, CONCENTRATE INTRAVENOUS at 16:56

## 2020-03-29 RX ADMIN — HYDRALAZINE HYDROCHLORIDE 5 MG: 20 INJECTION INTRAMUSCULAR; INTRAVENOUS at 05:37

## 2020-03-29 RX ADMIN — ESCITALOPRAM OXALATE 20 MG: 10 TABLET ORAL at 08:05

## 2020-03-29 RX ADMIN — ALBUTEROL SULFATE 2.5 MG: 2.5 SOLUTION RESPIRATORY (INHALATION) at 23:34

## 2020-03-29 RX ADMIN — SUCRALFATE 1 G: 1 TABLET ORAL at 11:41

## 2020-03-29 RX ADMIN — BUSPIRONE HYDROCHLORIDE 20 MG: 10 TABLET ORAL at 08:05

## 2020-03-29 RX ADMIN — Medication 8 MG/HR: at 14:42

## 2020-03-29 RX ADMIN — TRAZODONE HYDROCHLORIDE 100 MG: 100 TABLET ORAL at 20:32

## 2020-03-29 RX ADMIN — PANTOPRAZOLE SODIUM 40 MG: 40 INJECTION, POWDER, FOR SOLUTION INTRAVENOUS at 08:05

## 2020-03-29 RX ADMIN — MEROPENEM 1 G: 1 INJECTION, POWDER, FOR SOLUTION INTRAVENOUS at 20:31

## 2020-03-29 RX ADMIN — Medication 10 ML: at 08:05

## 2020-03-29 RX ADMIN — DOCUSATE SODIUM 100 MG: 50 LIQUID ORAL at 20:31

## 2020-03-29 RX ADMIN — FUROSEMIDE 20 MG: 10 INJECTION, SOLUTION INTRAMUSCULAR; INTRAVENOUS at 08:05

## 2020-03-29 RX ADMIN — METOPROLOL TARTRATE 5 MG: 5 INJECTION, SOLUTION INTRAVENOUS at 05:02

## 2020-03-29 RX ADMIN — MIDODRINE HYDROCHLORIDE 5 MG: 5 TABLET ORAL at 16:45

## 2020-03-29 RX ADMIN — I.V. FAT EMULSION 100 ML: 20 EMULSION INTRAVENOUS at 16:56

## 2020-03-29 RX ADMIN — GABAPENTIN 300 MG: 250 SOLUTION ORAL at 14:45

## 2020-03-29 RX ADMIN — FENTANYL CITRATE 100 MCG: 50 INJECTION, SOLUTION INTRAMUSCULAR; INTRAVENOUS at 13:33

## 2020-03-29 RX ADMIN — METOCLOPRAMIDE 10 MG: 5 INJECTION, SOLUTION INTRAMUSCULAR; INTRAVENOUS at 17:23

## 2020-03-29 RX ADMIN — METOCLOPRAMIDE 10 MG: 5 INJECTION, SOLUTION INTRAMUSCULAR; INTRAVENOUS at 11:33

## 2020-03-29 RX ADMIN — POLYETHYLENE GLYCOL 3350 17 G: 17 POWDER, FOR SOLUTION ORAL at 08:05

## 2020-03-29 RX ADMIN — IPRATROPIUM BROMIDE AND ALBUTEROL SULFATE 1 AMPULE: .5; 3 SOLUTION RESPIRATORY (INHALATION) at 15:21

## 2020-03-29 RX ADMIN — IPRATROPIUM BROMIDE AND ALBUTEROL SULFATE 1 AMPULE: .5; 3 SOLUTION RESPIRATORY (INHALATION) at 08:08

## 2020-03-29 RX ADMIN — GABAPENTIN 300 MG: 250 SOLUTION ORAL at 21:53

## 2020-03-29 RX ADMIN — BUSPIRONE HYDROCHLORIDE 20 MG: 10 TABLET ORAL at 20:31

## 2020-03-29 ASSESSMENT — PAIN SCALES - GENERAL
PAINLEVEL_OUTOF10: 0
PAINLEVEL_OUTOF10: 3
PAINLEVEL_OUTOF10: 2
PAINLEVEL_OUTOF10: 0
PAINLEVEL_OUTOF10: 2

## 2020-03-29 ASSESSMENT — PULMONARY FUNCTION TESTS
PIF_VALUE: 10
PIF_VALUE: 10
PIF_VALUE: 20
PIF_VALUE: 13
PIF_VALUE: 18
PIF_VALUE: 13
PIF_VALUE: 12
PIF_VALUE: 9
PIF_VALUE: 17
PIF_VALUE: 26
PIF_VALUE: 16
PIF_VALUE: 10
PIF_VALUE: 11
PIF_VALUE: 10
PIF_VALUE: 21
PIF_VALUE: 10
PIF_VALUE: 13
PIF_VALUE: 17

## 2020-03-29 NOTE — PROGRESS NOTES
-- -- 96 (!) 36 94 % --         Intake/Output Summary (Last 24 hours) at 3/29/2020 1052  Last data filed at 3/29/2020 1000  Gross per 24 hour   Intake 2564.95 ml   Output 3750 ml   Net -1185.05 ml     Date 03/29/20 0000 - 03/29/20 2359   Shift 2503-3712 3348-0633 9247-6102 24 Hour Total   INTAKE   I.V.(mL/kg) 253. 6(3) 134(1.6)  387. 6(4.5)   NG/GT(mL/kg)  100(1.2)  100(1.2)   TPN(mL/kg) 589. 3(6.9) 194(2.3)  783. 3(9.2)   Shift Total(mL/kg) 843(9.8) 428(5)  3514(26.9)   OUTPUT   Urine(mL/kg/hr) 795(1.2) 1325  2120   Emesis/NG output(mL/kg) 100(1.2)   100(1.2)   Shift Total(mL/kg) 895(10.5) 1325(15.5)  2220(25.9)   Weight (kg) 85.6 85.6 85.6 85.6     Wt Readings from Last 3 Encounters:   03/29/20 188 lb 11.4 oz (85.6 kg)   02/29/20 186 lb 1.1 oz (84.4 kg)   02/24/20 183 lb 6.4 oz (83.2 kg)     Body mass index is 34.52 kg/m². PHYSICAL EXAM:  Constitutional: Intubated, sedated on Precedex and Versed, not following any commands  EENT: PERRLA, EOMI, sclera clear, anicteric, oropharynx clear, no lesions, neck supple with midline trachea. Neck: Supple, symmetrical, trachea midline, no adenopathy, thyroid symmetric, no jvd skin normal  Respiratory: clear to auscultation, no wheezes or rales and unlabored breathing.  No intercostal tenderness  Cardiovascular: regular rate and rhythm, normal S1, S2, no murmur noted and 2+ pulses throughout  Abdomen: Has a PEG tube   NEUROLOGIC Intubated, sedated on Precedex and Versed, not following any commands  Extremities:  peripheral pulses normal, no pedal edema, no clubbing or cyanosis  SKIN: normal coloration and turgor    Any additional physical findings:      MEDICATIONS:  Scheduled Meds:   docusate  100 mg Oral BID    albuterol  2.5 mg Nebulization BID    mineral oil  45 mL Per G Tube Daily    polyethylene glycol  17 g Per G Tube Daily    midodrine  5 mg Oral TID WC    heparin (porcine)  5,000 Units Subcutaneous 3 times per day    furosemide  20 mg Intravenous Daily    ipratropium-albuterol  1 ampule Inhalation 4x daily    labetalol  10 mg Intravenous Once    gabapentin  300 mg Oral 3 times per day    fat emulsion  100 mL Intravenous Daily    insulin lispro  0-6 Units Subcutaneous Q6H    midazolam  2 mg Intravenous Once    sucralfate  1 g Oral 4 times per day    meropenem  1 g Intravenous Q8H    pantoprazole  40 mg Intravenous BID    And    sodium chloride (PF)  10 mL Intravenous BID    metoclopramide  10 mg Intravenous Q6H    bisacodyl  10 mg Rectal Daily    lidocaine 1 % injection  5 mL Intradermal Once    sodium chloride flush  10 mL Intravenous 2 times per day    traZODone  100 mg Oral Nightly    magnesium citrate  296 mL Oral Once    clonazePAM  0.5 mg Oral BID    [Held by provider] amitriptyline  10 mg Oral TID    [Held by provider] amLODIPine  10 mg Oral Nightly    sodium chloride flush  10 mL Intravenous 2 times per day    busPIRone  20 mg Oral TID    [Held by provider] metoprolol succinate  25 mg Oral Nightly    nicotine  1 patch Transdermal Daily    sodium chloride (PF)  10 mL Intravenous Daily    escitalopram  20 mg Oral Daily     Continuous Infusions:   dexmedetomidine (PRECEDEX) IV infusion 0.4 mcg/kg/hr (03/29/20 0945)    PN-Adult 2-in-1 Central Line (Standard) 65 mL/hr at 03/28/20 1741    dextrose      sodium chloride 10 mL/hr at 03/27/20 0900    norepinephrine Stopped (03/29/20 0200)    midazolam 7 mg/hr (03/29/20 0800)     PRN Meds:   artificial tears, , PRN  acetaminophen, 650 mg, Q4H PRN    Or  acetaminophen, 650 mg, Q6H PRN  albuterol, 2.5 mg, Q6H PRN  glucose, 15 g, PRN  dextrose, 12.5 g, PRN  glucagon (rDNA), 1 mg, PRN  dextrose, 100 mL/hr, PRN  LORazepam, 1 mg, Q6H PRN  dextromethorphan-guaiFENesin, 10 mL, Q4H PRN  sodium chloride flush, 10 mL, PRN  potassium chloride, 40 mEq, PRN    Or  potassium alternative oral replacement, 40 mEq, PRN    Or  potassium chloride, 10 mEq, PRN  magnesium hydroxide, 30 mL, Daily PRN  sodium Profile:   Recent Labs     03/27/20  0400 03/28/20  0500 03/29/20  0412    138 138   K 4.8 4.3 4.2   BUN 20 22 22   CREATININE 0.48* 0.47* 0.44*   CL 97* 98 98   CO2 30 30 29      Magnesium:   Lab Results   Component Value Date    MG 2.0 03/27/2020    MG 1.9 03/25/2020    MG 1.9 03/23/2020     Phosphorus:   Lab Results   Component Value Date    PHOS 3.3 03/27/2020    PHOS 3.2 03/25/2020    PHOS 2.6 03/23/2020     S. Calcium:  Recent Labs     03/29/20  0412   CALCIUM 9.3     S. Ionized Calcium:No results for input(s): IONCA in the last 72 hours. Urinalysis:   Lab Results   Component Value Date    NITRU NEGATIVE 03/17/2020    COLORU YELLOW 03/17/2020    PHUR 5.5 03/17/2020    WBCUA 0 TO 2 03/17/2020    RBCUA 20 TO 50 03/17/2020    MUCUS NOT REPORTED 03/17/2020    TRICHOMONAS NOT REPORTED 03/17/2020    YEAST MANY 03/17/2020    BACTERIA NOT REPORTED 03/17/2020    CLARITYU Clear 06/29/2018    SPECGRAV 1.013 03/17/2020    LEUKOCYTESUR NEGATIVE 03/17/2020    UROBILINOGEN Normal 03/17/2020    BILIRUBINUR NEGATIVE 03/17/2020    BILIRUBINUR Negative 06/29/2018    BLOODU Non-hemolyzed trace 06/29/2018    GLUCOSEU NEGATIVE 03/17/2020    KETUA NEGATIVE 03/17/2020    AMORPHOUS NOT REPORTED 03/17/2020       CARDIAC ENZYMES: No results for input(s): CKMB, CKMBINDEX, TROPONINI in the last 72 hours. Invalid input(s): CKTOTAL;3  BNP: No results for input(s): BNP in the last 72 hours. LFTS  No results for input(s): ALKPHOS, ALT, AST, BILITOT, BILIDIR, LABALBU in the last 72 hours. AMYLASE/LIPASE/AMMONIA  No results for input(s): AMYLASE, LIPASE, AMMONIA in the last 72 hours. Last 3 Blood Glucose:   Recent Labs     03/27/20  0400 03/28/20  0500 03/29/20  0412   GLUCOSE 112* 136* 130*      HgBA1c:  No results found for: LABA1C      TSH:  No results found for: TSH  ANEMIA STUDIES  No results for input(s): LABIRON, TIBC, FERRITIN, [id number], FOLATE, OCCULTBLD in the last 72 hours.             Cultures during this admission:     Blood cultures:                 [] None drawn      [x] Negative             []  Positive (Details:  )  Urine Culture:                   [] None drawn      [] Negative             [x]  Positive (Details: candida albicans )  Sputum Culture:               [] None drawn       [] Negative             [x]  Positive (Details: candida albicans   )   Endotracheal aspirate:     [] None drawn       [] Negative             []  Positive (Details:  )             Chest Xray (3/29/2020):    ASSESSMENT:     Patient Active Problem List   Diagnosis    Frequency of urination    Severe sepsis ([address]    Back pain    GERD (gastroesophageal reflux disease)    MVP (mitral valve prolapse)    Depression    Anxiety    Urine retention    Acute kidney injury ([address]    Esophageal dysphagia    Paraesophageal hernia    History of repair of hiatal hernia    Aspiration pneumonia ([address]    Respiratory failure ([address]    Centrilobular emphysema ([address]    Atelectasis    Pleural effusion    Esophageal dilatation    Upper GI bleeding    Acute on chronic blood loss anemia    Shock ([address]    Fever    Acute postoperative respiratory insufficiency    Critical illness polyneuropathy ([address]           PLAN:       Feeding Diet:  TPN 65 ml/hr    1. Fluids TF 10 ml/hr    2. Family none     3. Analgesic precedex    4. Sedation precedex & versed    5. Thrombo-prophylaxis heparin 5000 U TID     6. Mobility none     7. Heads up yes     8. Ulcer prophylaxis  IV Protonix 40 bd    9. Glycemic control , low dose ISS, required none     10. Spontaneous breathing trial  none    11. Bowel regimen/urine output  glycolax daily, mineral oil daily, dulcolax suppository, UOP 2775 ml over 24 hrs. Lasix 20 mg IV daily, negative 922 ml yesterday    12. Indwelling catheter/lines right basilic PICC line, chu, PEG tube    13.  De-escalation none     Acute hypoxic respiratory failure from possible aspiration pneumonia with some component of COPD

## 2020-03-29 NOTE — PLAN OF CARE
Problem: OXYGENATION/RESPIRATORY FUNCTION  Goal: Patient will maintain patent airway  3/29/2020 0845 by Cassie Love RCP  Outcome: Ongoing     Problem: OXYGENATION/RESPIRATORY FUNCTION  Goal: Patient will achieve/maintain normal respiratory rate/effort  Description: Respiratory rate and effort will be within normal limits for the patient  3/29/2020 0845 by Cassie Love RCP  Outcome: Ongoing     Problem: MECHANICAL VENTILATION  Goal: Patient will maintain patent airway  3/29/2020 0845 by Cassie Love RCP  Outcome: Ongoing     Problem: MECHANICAL VENTILATION  Goal: Oral health is maintained or improved  3/29/2020 0845 by Cassie Love RCP  Outcome: Ongoing     Problem: MECHANICAL VENTILATION  Goal: Ability to express needs and understand communication  3/29/2020 0845 by Cassie Love RCP  Outcome: Ongoing     Problem: MECHANICAL VENTILATION  Goal: Mobility/activity is maintained at optimum level for patient  3/29/2020 0845 by Cassie Love RCP  Outcome: Ongoing     Problem: MECHANICAL VENTILATION  Goal: Tracheostomy will be managed safely  3/29/2020 0845 by Cassie Love RCP  Outcome: Ongoing     Problem: SKIN INTEGRITY  Goal: Skin integrity is maintained or improved  3/29/2020 0845 by Cassie Love RCP  Outcome: Ongoing    BRONCHOSPASM/BRONCHOCONSTRICTION     [x]         IMPROVE AERATION/BREATH SOUNDS  [x]   ADMINISTER BRONCHODILATOR THERAPY AS APPROPRIATE  [x]   ASSESS BREATH SOUNDS  [x]   IMPLEMENT AEROSOL/MDI PROTOCOL  [x]   PATIENT EDUCATION AS NEEDED

## 2020-03-29 NOTE — PROGRESS NOTES
Nutrition Assessment (Parenteral Nutrition)    Type and Reason for Visit: Reassess    Nutrition Recommendations:   - Continue current TPN - no changes made to next bag. Provides 1580 kcal and 90 gm pro per day. - Monitor labs and modify as needed. - Continue trickle TF of Vital AF 1.2 (semi-elemental) at 10 mL/hr - as able slowly advance to goal rate of 55 mL/hr.    - Will monitor bowel function and plan of care. Nutrition Assessment: RN reports TF were started yesterday but then pt having bile-like secretions - TF held and G-tube set to gravity. Trickle TF have been restarted at 10 mL/hr per RN. Pt recieving reglan. TPN to continue. Noted pt with BM overnight. Malnutrition Assessment:  · Malnutrition Status:  At risk for malnutrition    Nutrition Risk Level: High    Nutrient Needs:  · Estimated Daily Total Kcal: 4003-7735 kcal/day  · Estimated Daily Protein (g):  g pro/day     Nutrition Diagnosis:   · Problem: Inadequate oral intake  · Etiology: related to Impaired respiratory function-inability to consume food, Alteration in GI function, Nausea, Vomiting     Signs and symptoms:  as evidenced by NPO status due to medical condition, Nutrition support - PN, Nutrition support - EN    Objective Information:  · Wound Type: Surgical Wound  · Current Nutrition Therapies:  · Oral Diet Orders: NPO   · Tube Feeding (TF) Orders:   · Feeding Route: Gastrostomy  · Formula: Semi-elemental  · Rate (ml/hr):10 mL/hr - trickle feeds    · Volume (ml/day): 240 mL/day / 1320 mL/day  · Duration: Continuous  · Current TF & Flush Orders Provides: 10 mL/hr = 288 kcal and 18 gm pro/day  · Goal TF & Flush Orders Provides: 55 mL/hr = 1584 kcal and 99 gm pro/day  · Parenteral Nutrition Orders:  · Type and Formula: 2-in-1 Custom(300gm dextrose, 90gm AA)   · Lipids: 100ml, Daily  · Rate/Volume: 65ml per hr  · Duration: Continuous  · Current PN Order Provides: see below  · Goal PN Orders Provides: 1580 kcal, 90gm

## 2020-03-29 NOTE — PLAN OF CARE
Mita Bryan RN  Outcome: Ongoing  3/28/2020 2121 by Analilia Sanders  Outcome: Ongoing     Problem: SKIN INTEGRITY  Goal: Skin integrity is maintained or improved  3/29/2020 0936 by Tia Mendez RN  Outcome: Ongoing  3/29/2020 0845 by Junior Hyacinth RCP  Outcome: Ongoing  3/28/2020 2217 by Bernadette Esquivel RN  Outcome: Ongoing  3/28/2020 2121 by Analilia Sanders  Outcome: Ongoing     Problem: Nutrition  Goal: Optimal nutrition therapy  Description: Nutrition Problem: Inadequate oral intake  Intervention: Food and/or Nutrient Delivery: Continue Parenteral Nutrition  Nutritional Goals: meet % of estimated nutrition needs    3/29/2020 0936 by Tia Mendez RN  Outcome: Ongoing  3/28/2020 2217 by Bernadette Esquivel RN  Outcome: Ongoing     Problem: Musculor/Skeletal Functional Status  Goal: Highest potential functional level  3/29/2020 0936 by Tia Mendez RN  Outcome: Ongoing  3/28/2020 2217 by Bernadette Esquivel RN  Outcome: Ongoing     Problem: MECHANICAL VENTILATION  Goal: ET tube will be managed safely  3/28/2020 2121 by Analilia Sanders  Outcome: Completed     Problem: Restraint Use - Nonviolent/Non-Self-Destructive Behavior:  Goal: Absence of restraint indications  Description: Absence of restraint indications  3/29/2020 0936 by Tia Mendez RN  Outcome: Completed  3/28/2020 2217 by Bernadette Esquivel RN  Outcome: Ongoing  Goal: Absence of restraint-related injury  Description: Absence of restraint-related injury  3/29/2020 0936 by Tia Mendez RN  Outcome: Completed  3/28/2020 2217 by Bernadette Esquivel RN  Outcome: Ongoing

## 2020-03-29 NOTE — PROGRESS NOTES
03/29/20 1141   Surgical Airway (trach) Shiley Cuffed   Placement Date/Time: 03/27/20 1121   Timeout: Patient;Procedure;Site/Side;Appropriate Equipment; Consent Confirmed;Sterile Technique using full body drape; Correct Position  Placed By: In surgery  Surgical Airway Type: Tracheostomy  Brand: Devante  Style. .. Status Secured   Site Assessment Clean;Dry   Site Care Cleansed;Dried;Dressing applied   Inner Cannula Care No inner cannula   Ties Assessment Dry; Intact; Secure   trach care completed

## 2020-03-29 NOTE — CARE COORDINATION
Care Transition  Patient remains intubated and sedated. Needs trach exchanged by surgery either Monday or Tuesday. Patient temp was 100.9 in past 24 hours.

## 2020-03-30 ENCOUNTER — APPOINTMENT (OUTPATIENT)
Dept: CT IMAGING | Age: 75
DRG: 004 | End: 2020-03-30
Attending: INTERNAL MEDICINE
Payer: MEDICARE

## 2020-03-30 LAB
ABSOLUTE EOS #: 0 K/UL (ref 0–0.4)
ABSOLUTE IMMATURE GRANULOCYTE: 0.13 K/UL (ref 0–0.3)
ABSOLUTE LYMPH #: 1.78 K/UL (ref 1–4.8)
ABSOLUTE MONO #: 0.13 K/UL (ref 0.1–0.8)
ALLEN TEST: ABNORMAL
ANION GAP SERPL CALCULATED.3IONS-SCNC: 10 MMOL/L (ref 9–17)
BASOPHILS # BLD: 1 % (ref 0–2)
BASOPHILS ABSOLUTE: 0.13 K/UL (ref 0–0.2)
BUN BLDV-MCNC: 26 MG/DL (ref 8–23)
BUN/CREAT BLD: ABNORMAL (ref 9–20)
CALCIUM SERPL-MCNC: 9.3 MG/DL (ref 8.6–10.4)
CHLORIDE BLD-SCNC: 98 MMOL/L (ref 98–107)
CO2: 29 MMOL/L (ref 20–31)
CREAT SERPL-MCNC: 0.42 MG/DL (ref 0.5–0.9)
DIFFERENTIAL TYPE: ABNORMAL
EOSINOPHILS RELATIVE PERCENT: 0 % (ref 1–4)
FIO2: 35
GFR AFRICAN AMERICAN: >60 ML/MIN
GFR NON-AFRICAN AMERICAN: >60 ML/MIN
GFR SERPL CREATININE-BSD FRML MDRD: ABNORMAL ML/MIN/{1.73_M2}
GFR SERPL CREATININE-BSD FRML MDRD: ABNORMAL ML/MIN/{1.73_M2}
GLUCOSE BLD-MCNC: 123 MG/DL (ref 65–105)
GLUCOSE BLD-MCNC: 132 MG/DL (ref 65–105)
GLUCOSE BLD-MCNC: 138 MG/DL (ref 65–105)
GLUCOSE BLD-MCNC: 153 MG/DL (ref 65–105)
GLUCOSE BLD-MCNC: 159 MG/DL (ref 70–99)
HCT VFR BLD CALC: 24.3 % (ref 36.3–47.1)
HEMOGLOBIN: 7.4 G/DL (ref 11.9–15.1)
IMMATURE GRANULOCYTES: 1 %
LYMPHOCYTES # BLD: 14 % (ref 24–44)
MAGNESIUM: 2.1 MG/DL (ref 1.6–2.6)
MCH RBC QN AUTO: 29.4 PG (ref 25.2–33.5)
MCHC RBC AUTO-ENTMCNC: 30.5 G/DL (ref 28.4–34.8)
MCV RBC AUTO: 96.4 FL (ref 82.6–102.9)
MODE: ABNORMAL
MONOCYTES # BLD: 1 % (ref 1–7)
MORPHOLOGY: ABNORMAL
MORPHOLOGY: ABNORMAL
NEGATIVE BASE EXCESS, ART: ABNORMAL (ref 0–2)
NRBC AUTOMATED: 0.2 PER 100 WBC
NUCLEATED RED BLOOD CELLS: 1 PER 100 WBC
O2 DEVICE/FLOW/%: ABNORMAL
PATIENT TEMP: ABNORMAL
PDW BLD-RTO: 15.7 % (ref 11.8–14.4)
PHOSPHORUS: 3.6 MG/DL (ref 2.6–4.5)
PLATELET # BLD: 330 K/UL (ref 138–453)
PLATELET ESTIMATE: ABNORMAL
PMV BLD AUTO: 10.8 FL (ref 8.1–13.5)
POC HCO3: 32.1 MMOL/L (ref 21–28)
POC O2 SATURATION: 99 % (ref 94–98)
POC PCO2 TEMP: ABNORMAL MM HG
POC PCO2: 44.7 MM HG (ref 35–48)
POC PH TEMP: ABNORMAL
POC PH: 7.46 (ref 7.35–7.45)
POC PO2 TEMP: ABNORMAL MM HG
POC PO2: 112.7 MM HG (ref 83–108)
POSITIVE BASE EXCESS, ART: 8 (ref 0–3)
POTASSIUM SERPL-SCNC: 3.7 MMOL/L (ref 3.7–5.3)
RBC # BLD: 2.52 M/UL (ref 3.95–5.11)
RBC # BLD: ABNORMAL 10*6/UL
SAMPLE SITE: ABNORMAL
SEG NEUTROPHILS: 83 % (ref 36–66)
SEGMENTED NEUTROPHILS ABSOLUTE COUNT: 10.53 K/UL (ref 1.8–7.7)
SODIUM BLD-SCNC: 137 MMOL/L (ref 135–144)
TCO2 (CALC), ART: 34 MMOL/L (ref 22–29)
WBC # BLD: 12.7 K/UL (ref 3.5–11.3)
WBC # BLD: ABNORMAL 10*3/UL

## 2020-03-30 PROCEDURE — 2580000003 HC RX 258: Performed by: STUDENT IN AN ORGANIZED HEALTH CARE EDUCATION/TRAINING PROGRAM

## 2020-03-30 PROCEDURE — 71250 CT THORAX DX C-: CPT

## 2020-03-30 PROCEDURE — 6360000002 HC RX W HCPCS: Performed by: STUDENT IN AN ORGANIZED HEALTH CARE EDUCATION/TRAINING PROGRAM

## 2020-03-30 PROCEDURE — 6370000000 HC RX 637 (ALT 250 FOR IP): Performed by: STUDENT IN AN ORGANIZED HEALTH CARE EDUCATION/TRAINING PROGRAM

## 2020-03-30 PROCEDURE — 94761 N-INVAS EAR/PLS OXIMETRY MLT: CPT

## 2020-03-30 PROCEDURE — 2500000003 HC RX 250 WO HCPCS: Performed by: STUDENT IN AN ORGANIZED HEALTH CARE EDUCATION/TRAINING PROGRAM

## 2020-03-30 PROCEDURE — 93971 EXTREMITY STUDY: CPT

## 2020-03-30 PROCEDURE — 80048 BASIC METABOLIC PNL TOTAL CA: CPT

## 2020-03-30 PROCEDURE — 37799 UNLISTED PX VASCULAR SURGERY: CPT

## 2020-03-30 PROCEDURE — 94640 AIRWAY INHALATION TREATMENT: CPT

## 2020-03-30 PROCEDURE — 6360000002 HC RX W HCPCS: Performed by: INTERNAL MEDICINE

## 2020-03-30 PROCEDURE — 82803 BLOOD GASES ANY COMBINATION: CPT

## 2020-03-30 PROCEDURE — 99291 CRITICAL CARE FIRST HOUR: CPT | Performed by: INTERNAL MEDICINE

## 2020-03-30 PROCEDURE — 85025 COMPLETE CBC W/AUTO DIFF WBC: CPT

## 2020-03-30 PROCEDURE — 99233 SBSQ HOSP IP/OBS HIGH 50: CPT | Performed by: INTERNAL MEDICINE

## 2020-03-30 PROCEDURE — 2700000000 HC OXYGEN THERAPY PER DAY

## 2020-03-30 PROCEDURE — C9113 INJ PANTOPRAZOLE SODIUM, VIA: HCPCS | Performed by: STUDENT IN AN ORGANIZED HEALTH CARE EDUCATION/TRAINING PROGRAM

## 2020-03-30 PROCEDURE — 97110 THERAPEUTIC EXERCISES: CPT

## 2020-03-30 PROCEDURE — 83735 ASSAY OF MAGNESIUM: CPT

## 2020-03-30 PROCEDURE — 84100 ASSAY OF PHOSPHORUS: CPT

## 2020-03-30 PROCEDURE — 2580000003 HC RX 258: Performed by: INTERNAL MEDICINE

## 2020-03-30 PROCEDURE — 82947 ASSAY GLUCOSE BLOOD QUANT: CPT

## 2020-03-30 PROCEDURE — 2000000000 HC ICU R&B

## 2020-03-30 PROCEDURE — 94770 HC ETCO2 MONITOR DAILY: CPT

## 2020-03-30 PROCEDURE — 94003 VENT MGMT INPAT SUBQ DAY: CPT

## 2020-03-30 RX ORDER — PROPOFOL 10 MG/ML
10 INJECTION, EMULSION INTRAVENOUS
Status: DISCONTINUED | OUTPATIENT
Start: 2020-03-30 | End: 2020-04-03

## 2020-03-30 RX ORDER — AMITRIPTYLINE HYDROCHLORIDE 25 MG/1
25 TABLET, FILM COATED ORAL 3 TIMES DAILY
Status: DISCONTINUED | OUTPATIENT
Start: 2020-03-30 | End: 2020-04-06 | Stop reason: HOSPADM

## 2020-03-30 RX ADMIN — DOCUSATE SODIUM 100 MG: 50 LIQUID ORAL at 08:42

## 2020-03-30 RX ADMIN — MINERAL OIL 45 ML: 1000 SOLUTION ORAL at 08:39

## 2020-03-30 RX ADMIN — ALBUTEROL SULFATE 2.5 MG: 2.5 SOLUTION RESPIRATORY (INHALATION) at 03:15

## 2020-03-30 RX ADMIN — BUSPIRONE HYDROCHLORIDE 20 MG: 10 TABLET ORAL at 22:20

## 2020-03-30 RX ADMIN — Medication 10 ML: at 22:20

## 2020-03-30 RX ADMIN — CLONAZEPAM 0.5 MG: 0.5 TABLET ORAL at 08:38

## 2020-03-30 RX ADMIN — IPRATROPIUM BROMIDE AND ALBUTEROL SULFATE 1 AMPULE: .5; 3 SOLUTION RESPIRATORY (INHALATION) at 07:57

## 2020-03-30 RX ADMIN — METOCLOPRAMIDE 10 MG: 5 INJECTION, SOLUTION INTRAMUSCULAR; INTRAVENOUS at 06:15

## 2020-03-30 RX ADMIN — DOCUSATE SODIUM 100 MG: 50 LIQUID ORAL at 22:19

## 2020-03-30 RX ADMIN — IPRATROPIUM BROMIDE AND ALBUTEROL SULFATE 1 AMPULE: .5; 3 SOLUTION RESPIRATORY (INHALATION) at 11:13

## 2020-03-30 RX ADMIN — IPRATROPIUM BROMIDE AND ALBUTEROL SULFATE 1 AMPULE: .5; 3 SOLUTION RESPIRATORY (INHALATION) at 20:30

## 2020-03-30 RX ADMIN — DEXMEDETOMIDINE HYDROCHLORIDE 0.9 MCG/HR: 100 INJECTION, SOLUTION INTRAVENOUS at 23:30

## 2020-03-30 RX ADMIN — IPRATROPIUM BROMIDE AND ALBUTEROL SULFATE 1 AMPULE: .5; 3 SOLUTION RESPIRATORY (INHALATION) at 17:12

## 2020-03-30 RX ADMIN — PROPOFOL 10 MCG/KG/MIN: 10 INJECTION, EMULSION INTRAVENOUS at 11:14

## 2020-03-30 RX ADMIN — HEPARIN SODIUM 5000 UNITS: 5000 INJECTION INTRAVENOUS; SUBCUTANEOUS at 22:19

## 2020-03-30 RX ADMIN — AMITRIPTYLINE HYDROCHLORIDE 25 MG: 25 TABLET, FILM COATED ORAL at 22:20

## 2020-03-30 RX ADMIN — CLONAZEPAM 0.5 MG: 0.5 TABLET ORAL at 22:20

## 2020-03-30 RX ADMIN — MIDODRINE HYDROCHLORIDE 5 MG: 5 TABLET ORAL at 17:48

## 2020-03-30 RX ADMIN — PANTOPRAZOLE SODIUM 40 MG: 40 INJECTION, POWDER, FOR SOLUTION INTRAVENOUS at 22:20

## 2020-03-30 RX ADMIN — Medication 10 MG/HR: at 21:00

## 2020-03-30 RX ADMIN — ESCITALOPRAM OXALATE 20 MG: 10 TABLET ORAL at 08:39

## 2020-03-30 RX ADMIN — PANTOPRAZOLE SODIUM 40 MG: 40 INJECTION, POWDER, FOR SOLUTION INTRAVENOUS at 08:38

## 2020-03-30 RX ADMIN — BISACODYL 10 MG: 10 SUPPOSITORY RECTAL at 08:38

## 2020-03-30 RX ADMIN — SUCRALFATE 1 G: 1 TABLET ORAL at 06:15

## 2020-03-30 RX ADMIN — BUSPIRONE HYDROCHLORIDE 20 MG: 10 TABLET ORAL at 14:26

## 2020-03-30 RX ADMIN — FUROSEMIDE 20 MG: 10 INJECTION, SOLUTION INTRAMUSCULAR; INTRAVENOUS at 08:38

## 2020-03-30 RX ADMIN — FENTANYL CITRATE 100 MCG: 50 INJECTION, SOLUTION INTRAMUSCULAR; INTRAVENOUS at 22:19

## 2020-03-30 RX ADMIN — METOCLOPRAMIDE 10 MG: 5 INJECTION, SOLUTION INTRAMUSCULAR; INTRAVENOUS at 17:34

## 2020-03-30 RX ADMIN — DEXMEDETOMIDINE HYDROCHLORIDE 0.9 MCG/KG/HR: 100 INJECTION, SOLUTION INTRAVENOUS at 17:56

## 2020-03-30 RX ADMIN — TRAZODONE HYDROCHLORIDE 100 MG: 100 TABLET ORAL at 22:20

## 2020-03-30 RX ADMIN — ALBUTEROL SULFATE 2.5 MG: 2.5 SOLUTION RESPIRATORY (INHALATION) at 23:51

## 2020-03-30 RX ADMIN — AMITRIPTYLINE HYDROCHLORIDE 25 MG: 25 TABLET, FILM COATED ORAL at 14:26

## 2020-03-30 RX ADMIN — MINERAL OIL AND WHITE PETROLATUM: 150; 830 OINTMENT OPHTHALMIC at 22:19

## 2020-03-30 RX ADMIN — GABAPENTIN 300 MG: 250 SOLUTION ORAL at 06:15

## 2020-03-30 RX ADMIN — Medication 10 ML: at 08:38

## 2020-03-30 RX ADMIN — DEXMEDETOMIDINE HYDROCHLORIDE 0.8 MCG/KG/HR: 100 INJECTION, SOLUTION INTRAVENOUS at 00:51

## 2020-03-30 RX ADMIN — GABAPENTIN 300 MG: 250 SOLUTION ORAL at 22:20

## 2020-03-30 RX ADMIN — SUCRALFATE 1 G: 1 TABLET ORAL at 17:48

## 2020-03-30 RX ADMIN — MIDODRINE HYDROCHLORIDE 5 MG: 5 TABLET ORAL at 11:58

## 2020-03-30 RX ADMIN — VECURONIUM BROMIDE 9 MG: 1 INJECTION, POWDER, LYOPHILIZED, FOR SOLUTION INTRAVENOUS at 00:06

## 2020-03-30 RX ADMIN — Medication 10 MG/HR: at 08:57

## 2020-03-30 RX ADMIN — BUSPIRONE HYDROCHLORIDE 20 MG: 10 TABLET ORAL at 08:38

## 2020-03-30 RX ADMIN — POLYETHYLENE GLYCOL 3350 17 G: 17 POWDER, FOR SOLUTION ORAL at 08:41

## 2020-03-30 RX ADMIN — MEROPENEM 1 G: 1 INJECTION, POWDER, FOR SOLUTION INTRAVENOUS at 11:58

## 2020-03-30 RX ADMIN — HEPARIN SODIUM 5000 UNITS: 5000 INJECTION INTRAVENOUS; SUBCUTANEOUS at 14:26

## 2020-03-30 RX ADMIN — POTASSIUM CHLORIDE: 2 INJECTION, SOLUTION, CONCENTRATE INTRAVENOUS at 17:40

## 2020-03-30 RX ADMIN — HEPARIN SODIUM 5000 UNITS: 5000 INJECTION INTRAVENOUS; SUBCUTANEOUS at 06:12

## 2020-03-30 RX ADMIN — GABAPENTIN 300 MG: 250 SOLUTION ORAL at 14:26

## 2020-03-30 RX ADMIN — MEROPENEM 1 G: 1 INJECTION, POWDER, FOR SOLUTION INTRAVENOUS at 04:09

## 2020-03-30 RX ADMIN — DEXMEDETOMIDINE HYDROCHLORIDE 0.8 MCG/KG/HR: 100 INJECTION, SOLUTION INTRAVENOUS at 06:20

## 2020-03-30 RX ADMIN — MIDODRINE HYDROCHLORIDE 5 MG: 5 TABLET ORAL at 08:38

## 2020-03-30 RX ADMIN — SUCRALFATE 1 G: 1 TABLET ORAL at 11:57

## 2020-03-30 RX ADMIN — METOCLOPRAMIDE 10 MG: 5 INJECTION, SOLUTION INTRAMUSCULAR; INTRAVENOUS at 11:57

## 2020-03-30 RX ADMIN — MEROPENEM 1 G: 1 INJECTION, POWDER, FOR SOLUTION INTRAVENOUS at 20:11

## 2020-03-30 ASSESSMENT — PAIN SCALES - GENERAL
PAINLEVEL_OUTOF10: 2
PAINLEVEL_OUTOF10: 2
PAINLEVEL_OUTOF10: 0
PAINLEVEL_OUTOF10: 0

## 2020-03-30 ASSESSMENT — PULMONARY FUNCTION TESTS
PIF_VALUE: 11
PIF_VALUE: 10
PIF_VALUE: 13
PIF_VALUE: 14
PIF_VALUE: 14
PIF_VALUE: 19
PIF_VALUE: 23
PIF_VALUE: 10
PIF_VALUE: 13
PIF_VALUE: 14

## 2020-03-30 NOTE — PROGRESS NOTES
Her SaO2 was 60 % in the ED. There was concern of aspiration. CTA chest done was negative for PE, showed bilateral pleural effusions and severe esophageal dilatation, surgery was consulted. Patient was transferred to [facility] for further management. Patient was started on noninvasive ventilation and then intubated and admitted to the ICU. She improved, was extubated on 3-7 to high flow O2. Pt was transferred out of the ICU on 3-13. On 3-14 pts hgb dropped to 6.3 and she developed coffee-ground then bright red emesis, and was again transferred back to medical ICU. General surgery performed endoscopy on 3/17 which showed an ulcer at the GE junction and mild gastritis with no active bleeding. After the procedure she developed acute respiratory distress and a fever of 39.8. She was emergently intubated and started on Meropenem and Vancomycin. ID is consulted for antibiotic management. Patient was continued on meropenem. Vancomycin D/C    CURRENT EXAMINATION: 3/30/2020     Pt seen and examined in the ICU. S/P trach and PEG placement 3/27  Tracheal exchange is planned in OR on 3-28-20 pending approval by son. Off fentanyl  On Propofol and midazolam  On Levophed 2mcg    Afebrile - tmax 100  VS stable on levophed 3 mcg    Constipated  Flat plate of abdomen 4-35-25 with nonobstructive gas pattern with retained enteric contrast in the colon  On TPN. Patient could not tolerate tube feeds. Has superficial venous clot Rt  Mid forearm to antecubital area    Chest x-rays (3-18-->3-21-20) show bilateral infiltrates   CXR 3-22-20: Shows worsening of bilateral infiltrates and new RLL opacity  CXR 3-23  Stable multifocal opacities accounting for positional differences  CXR 3-24: Improvement in diffuse interstitial opacities  3-27-20: Small Lt effusion, diffuse infiltrates bilaterally    HRR   Abd soft, quiet bowel sounds.     Labs, X rays reviewed: 3/30/2020    BUN: 16->15->14->17->20  Cr: atrophic from infections, age 13   Kenzie Crow TRACHEOSTOMY N/A 3/27/2020    **ADD ON **WANTS 10:00AM **TRACHEOTOMY performed by Sandra Liriano MD at [address] ENDOSCOPY N/A 3/17/2020    BEDSIDE EGD ESOPHAGOGASTRODUODENOSCOPY (ICU) performed by Sandra Liriano MD at [facility]       Medications:      amitriptyline  25 mg Oral TID    docusate  100 mg Oral BID    albuterol  2.5 mg Nebulization BID    mineral oil  45 mL Per G Tube Daily    polyethylene glycol  17 g Per G Tube Daily    midodrine  5 mg Oral TID WC    heparin (porcine)  5,000 Units Subcutaneous 3 times per day    furosemide  20 mg Intravenous Daily    ipratropium-albuterol  1 ampule Inhalation 4x daily    labetalol  10 mg Intravenous Once    gabapentin  300 mg Oral 3 times per day    fat emulsion  100 mL Intravenous Daily    insulin lispro  0-6 Units Subcutaneous Q6H    midazolam  2 mg Intravenous Once    sucralfate  1 g Oral 4 times per day    meropenem  1 g Intravenous Q8H    pantoprazole  40 mg Intravenous BID    And    sodium chloride (PF)  10 mL Intravenous BID    metoclopramide  10 mg Intravenous Q6H    bisacodyl  10 mg Rectal Daily    lidocaine 1 % injection  5 mL Intradermal Once    sodium chloride flush  10 mL Intravenous 2 times per day    traZODone  100 mg Oral Nightly    magnesium citrate  296 mL Oral Once    clonazePAM  0.5 mg Oral BID    [Held by provider] amLODIPine  10 mg Oral Nightly    sodium chloride flush  10 mL Intravenous 2 times per day    busPIRone  20 mg Oral TID    [Held by provider] metoprolol succinate  25 mg Oral Nightly    nicotine  1 patch Transdermal Daily    sodium chloride (PF)  10 mL Intravenous Daily    escitalopram  20 mg Oral Daily       Social History:     Social History     Socioeconomic History    Marital status:       Spouse name: Not on file    Number of children: 2    Years of education: Not on file    Highest education tinnitus or vertigo. Cardiovascular: No chest pain or palpitations. No shortness of breath. No HOLLAND  Lung: No shortness of breath or cough. No sputum production  Abdomen: No nausea, vomiting, diarrhea, or abdominal pain. Lenka Curia No cramps. Genitourinary: No increased urinary frequency, or dysuria. No hematuria. No suprapubic or CVA pain  Musculoskeletal: No muscle aches or pains. No joint effusions, swelling or deformities  Hematologic: No bleeding or bruising. Neurologic: No headache, weakness, numbness, or tingling. Integument: No rash, no ulcers. Psychiatric: No depression. Endocrine: No polyuria, no polydipsia, no polyphagia.     Physical Examination :     Patient Vitals for the past 8 hrs:   BP Temp Temp src Pulse Resp SpO2   03/30/20 1245 -- -- -- 86 27 98 %   03/30/20 1230 -- -- -- 89 30 98 %   03/30/20 1215 -- -- -- 94 (!) 36 98 %   03/30/20 1200 134/63 100 °F (37.8 °C) CORE 91 (!) 32 97 %   03/30/20 1145 -- -- -- 86 (!) 33 99 %   03/30/20 1130 -- -- -- 99 (!) 33 96 %   03/30/20 1117 -- -- -- 84 (!) 42 97 %   03/30/20 1115 -- -- -- 77 (!) 39 97 %   03/30/20 1100 -- -- -- 84 (!) 37 97 %   03/30/20 1049 -- -- -- 83 (!) 40 97 %   03/30/20 1045 -- -- -- 87 (!) 46 97 %   03/30/20 1030 -- -- -- 91 (!) 39 97 %   03/30/20 1015 -- -- -- 92 (!) 37 97 %   03/30/20 1000 -- -- -- 86 (!) 41 98 %   03/30/20 0945 -- -- -- 64 -- 97 %   03/30/20 0930 -- -- -- 82 (!) 33 95 %   03/30/20 0923 -- -- -- 101 (!) 36 91 %   03/30/20 0915 -- -- -- 99 (!) 37 92 %   03/30/20 0900 -- -- -- 95 (!) 35 94 %   03/30/20 0845 -- -- -- 87 (!) 42 95 %   03/30/20 0830 -- -- -- 87 (!) 40 93 %   03/30/20 0815 -- -- -- 77 (!) 36 96 %   03/30/20 0804 -- -- -- 73 (!) 38 96 %   03/30/20 0800 (!) 117/47 98.2 °F (36.8 °C) CORE 58 28 96 %   03/30/20 0745 -- -- -- 58 (!) 33 96 %   03/30/20 0730 -- -- -- 59 (!) 33 96 %   03/30/20 0715 -- -- -- 59 30 96 %   03/30/20 0700 -- -- -- 59 (!) 32 96 %   03/30/20 0645 -- -- -- 58 (!) 32 96 %   03/30/20 0630 -- -- -- 58 Detected   Influenza A H1 (2009) PCR Latest Ref Range: Not Detected  NOT REPORTED   Influenza A H3 PCR Latest Ref Range: Not Detected  NOT REPORTED   Influenza B by PCR Latest Ref Range: Not Detected  Not Detected   Parainfluenza 1 PCR Latest Ref Range: Not Detected  Not Detected   Parainfluenza 2 PCR Latest Ref Range: Not Detected  Not Detected   Parainfluenza 3 PCR Latest Ref Range: Not Detected  Not Detected   Parainfluenza 4 PCR Latest Ref Range: Not Detected  Not Detected   Resp Syncytial Virus PCR Latest Ref Range: Not Detected  Not Detected   Mycoplasma pneumo by PCR Latest Ref Range: Not Detected  Not Detected       3/19/2020  7:50 AM - [place], [facility] Incoming Lab Results From [facility]     Specimen Information: Urine        Component Collected Lab   Specimen Description 03/17/2020  2:47  [address]   . URINE    Special Requests 03/17/2020  2:47  [address]   NOT REPORTED    Culture Abnormal  03/17/2020  2:47  [address]   PRESUMPTIVE FLORA ALBICANS >030284 CFU/ML    Testing Performed By       Medical Decision Making-Other:     Note:  · Labs, medications, radiologic studies were reviewed with personal review of films  · Moderate Large amounts of data were reviewed  · Discussed with nursing Staff, Discharge planner  · Infection Control and Prevention measures reviewed  · All prior entries were reviewed  · Administer medications as ordered  · Prognosis: Guarded  · Discharge planning reviewed  · Follow up as outpatient. Thank you for allowing us to participate in the care of this patient. Please call with questions.   Marisol Sierra MD.    3/30/2020 1:17 PM

## 2020-03-30 NOTE — CARE COORDINATION
[facility] to see how patient is doing, I returned the call with update and plan for surgery tomorrow at 7:30am, [facility] following.

## 2020-03-30 NOTE — PLAN OF CARE
Problem: Pain:  Goal: Pain level will decrease  Description: Pain level will decrease  3/29/2020 2002 by Rosalia Clark RN  Outcome: Ongoing  3/29/2020 0936 by Joshua Hamilton RN  Outcome: Ongoing  Goal: Control of acute pain  Description: Control of acute pain  3/29/2020 2002 by Rosalia Clark RN  Outcome: Ongoing  3/29/2020 0936 by Joshua Hamilton RN  Outcome: Ongoing  Goal: Control of chronic pain  Description: Control of chronic pain  3/29/2020 2002 by Rosalia Clark RN  Outcome: Ongoing  3/29/2020 0936 by Joshua Hamilton RN  Outcome: Ongoing     Problem: Discharge Planning:  Goal: Participates in care planning  Description: Participates in care planning  3/29/2020 2002 by Rosalia Clark RN  Outcome: Ongoing  3/29/2020 0936 by Joshua Hamilton RN  Outcome: Ongoing  Goal: Discharged to appropriate level of care  Description: Discharged to appropriate level of care  3/29/2020 2002 by Rosalia Clark RN  Outcome: Ongoing  3/29/2020 0936 by Joshua Hamilton RN  Outcome: Ongoing     Problem: Anxiety/Stress:  Goal: Level of anxiety will decrease  Description: Level of anxiety will decrease  3/29/2020 2002 by Rosalia Clark RN  Outcome: Ongoing  3/29/2020 0936 by Joshua Hamilton RN  Outcome: Ongoing     Problem:  Bowel Function - Altered:  Goal: Bowel elimination is within specified parameters  Description: Bowel elimination is within specified parameters  3/29/2020 2002 by Rosalia Clark RN  Outcome: Ongoing  3/29/2020 0936 by Joshua Hamilton RN  Outcome: Ongoing     Problem: Nutrition Deficit:  Goal: Ability to achieve adequate nutritional intake will improve  Description: Ability to achieve adequate nutritional intake will improve  3/29/2020 2002 by Rosalia Clark RN  Outcome: Ongoing  3/29/2020 0936 by Joshua Hamilton RN  Outcome: Ongoing     Problem: Pain:  Goal: Recognizes and communicates pain  Description: Recognizes and communicates pain  3/29/2020 2002 by Rosalia Clark RN  Outcome: Ongoing  3/29/2020 0936 by Ciara Good Ongoing  3/29/2020 0845 by Starr Michele RCP  Outcome: Ongoing  Goal: Patient will achieve/maintain normal respiratory rate/effort  Description: Respiratory rate and effort will be within normal limits for the patient  3/29/2020 2002 by Iraj Peng RN  Outcome: Ongoing  3/29/2020 0936 by Alejandro Urrutia RN  Outcome: Ongoing  3/29/2020 0845 by Starr Michele RCP  Outcome: Ongoing     Problem: MECHANICAL VENTILATION  Goal: Patient will maintain patent airway  3/29/2020 2002 by Iraj Peng RN  Outcome: Ongoing  3/29/2020 0936 by Alejandro Urrutia RN  Outcome: Ongoing  3/29/2020 0845 by Starr Michele RCP  Outcome: Ongoing  Goal: Oral health is maintained or improved  3/29/2020 2002 by Iraj Peng RN  Outcome: Ongoing  3/29/2020 0936 by Alejandro Urrutia RN  Outcome: Ongoing  3/29/2020 0845 by Starr Michele RCP  Outcome: Ongoing  Goal: Ability to express needs and understand communication  3/29/2020 2002 by Iraj Peng RN  Outcome: Ongoing  3/29/2020 0936 by Alejandro Urrutia RN  Outcome: Ongoing  3/29/2020 0845 by Starr Michele RCP  Outcome: Ongoing  Goal: Mobility/activity is maintained at optimum level for patient  3/29/2020 2002 by Iraj Peng RN  Outcome: Ongoing  3/29/2020 0936 by Alejandro Urrutia RN  Outcome: Ongoing  3/29/2020 0845 by Starr Michele RCP  Outcome: Ongoing  Goal: Tracheostomy will be managed safely  3/29/2020 2002 by Iraj Peng RN  Outcome: Ongoing  3/29/2020 0936 by Alejandro Urrutia RN  Outcome: Ongoing  3/29/2020 0845 by Starr Michele RCP  Outcome: Ongoing     Problem: SKIN INTEGRITY  Goal: Skin integrity is maintained or improved  3/29/2020 2002 by Iraj Peng RN  Outcome: Ongoing  3/29/2020 0936 by Alejandro Urrutia RN  Outcome: Ongoing  3/29/2020 0845 by Starr Michele RCP  Outcome: Ongoing     Problem: Nutrition  Goal: Optimal nutrition therapy  Description: Nutrition Problem: Inadequate oral intake  Intervention: Food and/or Nutrient Delivery: Continue Parenteral Nutrition  Nutritional Goals: meet % of estimated nutrition needs    3/29/2020 2002 by Veronica Pagan RN  Outcome: Ongoing  3/29/2020 0936 by Aislinn Hollingsworth RN  Outcome: Ongoing     Problem: Musculor/Skeletal Functional Status  Goal: Highest potential functional level  3/29/2020 2002 by Veronica Pagan RN  Outcome: Ongoing  3/29/2020 0936 by Aislinn Hollingsworth RN  Outcome: Ongoing

## 2020-03-30 NOTE — PROGRESS NOTES
Nutrition Assessment (Enteral/Parenteral Nutrition)    Type and Reason for Visit: Reassess    Nutrition Recommendations: Continue TPN- decrease kcals/pro in next bag d/t start of TF. Will continue to monitor TPN/labs and TF tolerance. Recommend eventual TF goal rate of 55 mL/hr. Nutrition Assessment: Tube Feeding advanced to 20 mL/hr this morning- to keep at this rate for now per RN. TPN continues at this time. Malnutrition Assessment:  · Malnutrition Status: At risk for malnutrition  · Context: Acute illness or injury  · Findings of the 6 clinical characteristics of malnutrition (Minimum of 2 out of 6 clinical characteristics is required to make the diagnosis of moderate or severe Protein Calorie Malnutrition based on AND/ASPEN Guidelines):  1. Energy Intake-Greater than 75% of estimated energy requirement, Greater than or equal to 5 days(with start of TPN)    2. Weight Loss-No significant weight loss, (3 weeks)  3. Fat Loss-Mild subcutaneous fat loss, Orbital  4. Muscle Loss-No significant muscle mass loss,    5. Fluid Accumulation-Moderate to severe fluid accumulation, Extremities, Generalized  6.  Strength-Not measured    Nutrition Risk Level: High    Nutrient Needs:  · Estimated Daily Total Kcal: 9377-1575 kcal/day  · Estimated Daily Protein (g):  g pro/day     Nutrition Diagnosis:   · Problem: Inadequate oral intake  · Etiology: related to Impaired respiratory function-inability to consume food, Alteration in GI function, Nausea, Vomiting     Signs and symptoms:  as evidenced by NPO status due to medical condition, Nutrition support - PN, Nutrition support - EN    Objective Information:  · Current Nutrition Therapies:  · Oral Diet Orders: NPO   · Enteral Nutrition Orders: Semi-elemental at 20 mL/hr =576 kcal and 36 g pro/day.    · Parenteral Nutrition Orders:  · Type and Formula: 2-in-1 Custom(300gm dextrose, 90gm AA)   · Lipids: 100ml, Daily  · Rate/Volume: 65ml per hr  · Duration: Continuous  · Current PN Orders Provides: 1580 kcal, 90gm protein  · Additional Calories: none   · Anthropometric Measures:  · Ht: 5' 2\" (157.5 cm)   · Current Body Wt: 195 lb 5.2 oz (88.6 kg)  · Admission Body Wt: 197 lb 12 oz (89.7 kg)  · Usual Body Wt: 186 lb on 2/10/20  · Ideal Body Wt: 110 lb (49.9 kg), % Ideal Body 177%   · BMI Classification: BMI 35.0 - 39.9 Obese Class II    Nutrition Interventions:   Continue Parenteral Nutrition, Continue current Tube Feeding  Continued Inpatient Monitoring, Education Not Indicated    Nutrition Evaluation:   · Evaluation: Goal achieved   · Goals: meet % of estimated nutrition needs    · Monitoring: TF Intake, TF Tolerance, I&O, Weight, Pertinent Labs, PN Intake, PN Tolerance      Electronically signed by Doroteo Ramirez RD, LD on 3/30/20 at 2:38 PM EDT    Contact Number: [phone]

## 2020-03-30 NOTE — PROGRESS NOTES
chloride flush  10 mL Intravenous 2 times per day    busPIRone  20 mg Oral TID    [Held by provider] metoprolol succinate  25 mg Oral Nightly    nicotine  1 patch Transdermal Daily    sodium chloride (PF)  10 mL Intravenous Daily    escitalopram  20 mg Oral Daily           [facility]  Mary Salgado MD  [employer] messaging  OFFICE: [phone]      Electronically signed by Mary Salgado MD on 3/29/2020 at 8:31 PM  Thank you for allowing us to participate in the care of this patient. Please call with questions. This note iscreated with the assistance of a speech recognition program.  While intending to generate a document that actually reflects the content of the visit, the document can still have some errors including those of syntax andsound a like substitutions which may escape proof reading. In such instances, actual meaning can be extrapolated by contextual diversion.

## 2020-03-30 NOTE — PROGRESS NOTES
Physical Therapy  DATE: 3/30/2020  NAME: Enma Gama  MRN: [id number]   : 1945    Discharge Recommendations: Continue to Assess (pending progress)     Subjective: RN agreeable to ROM. Pain: LIZZY, no facial grimacing noted  Patient follows: No Commands  Is patient on ventilator: Yes  Is patient on sedation: Yes  Precautions:General     Therapeutic exercises:  UE/LE(s)  Bilateral Passive range of motion all planes x 10 reps  bilateral gastrocnemius stretching 3 reps x 30 seconds  FDS switched to RLE. All vitals remained stable throughout       Goals  Short Term Goals  Short term goal 1: Pt to ambulate 100ft supervision with RW  Short term goal 2: Pt to sit <> stand transfer supervision  Short term goal 3: Pt to tolerate 30 minutes of therapy for endurance  Short term goal 4: Pt to demonstrate supervision bed mobility  Short term goal 5: Pt to demonstrate good to good - standing balance to decrease risk of falls    Plan: Progress functional mobility as medically appropriate.    Time In: 3074  Time Out: 1511  Time Coded Minutes (treatment minutes): 16  Rehab Potential: Fair  Treatments/week: 2-3x/wk    Dex Abdalla PTA

## 2020-03-30 NOTE — PROGRESS NOTES
[facility] -Progress Note    Today's Date and Time: 3/31/2020, 12:02 PM    Impression :     · Acute respiratory failure s/p intubation 3-17, trach placement 3-27  · S/P PEG placement 3-27  · S/p EGD 3-17  · PUD  · GERD  · S/P hiatal hernia repair and Deshaun semi fundoplication 5-20-52  · Urine culture growing Candida  · Pleural effusion  · Acute blood loss anemia  · BENEDICT    Recommendations:   · Discontinue IV meropenem. Stop date 3-31-20  · Discontinued Diflucan 400 mg po x 7 days stop date 3-26  · Supportive care  · Monitor clinical progression  · Low probability of COVID, no need to test at this time  · Abd xray    Medical Decision Making/Summary/Discussion:3/31/2020     ·   Infection Control Recommendations   · [place] Precautions    Antimicrobial Stewardship Recommendations     · Simplification of therapy  · Targeted therapy    Coordination of Outpatient Care:   · Estimated Length of IV antimicrobials:TBD  · Patient will need Midline Catheter Insertion: TBD  · Patient will need PICC line Insertion:  · Patient will need: Home IV , [place],  SNF,  LTAC:TBD  · Patient will need outpatient wound care:TBD    Chief complaint/reason for consultation:    High grade fever    History of Present Illness:   Adalberto Johnson is a 76y.o.-year-old  female who was initially admitted on 2/29/2020. Patient seen at the request of Dr. Bowser Meals:    Patient has a history of severe GERD despite being on PPI, H2 blocker and Tums. She underwent laparoscopic robotic hiatal hernia repair and fundoplication last month on 02/24/2020 and postoperatively she developed hypoxia. CT chest done on 02/26/2020 showed bibasilar atelectasis/consolidation and patchy area of infiltrate in the right upper lobe. Patient was discharged on home oxygen on 2-28. She presented back to [address] [place] [address] ER on 2-29 with worsening shortness of breath, dyspnea as well as an episode of dark red emesis. quiet bowel sounds. Labs, X rays reviewed: 3/31/2020    BUN: 16->15->14->17->20  Cr: 0.38-->0.46->0.48->0.42    WBC: 7.0-> 6.5-->9.9->9.4->12.3->10.3  Hb: 9.0-> 8.0-->9.1->7.3->7.7->7.2  Plat: 228--> 200->173->215->349    3-17 Viral PCR negative    Cultures:  Urine:  · 3-17: Candida albicans  Blood:  · 3-17: No growth  · 3-26 x 2 pending  Sputum :  · Tracheal aspirate 3-17: Pseudohyphae, few budding yeast cells seen. Candida albicans  Wound:  ·     Discussed with patient, RN. I have personally reviewed the past medical history, past surgical history, medications, social history, and family history, and I have updated the database accordingly.   Past Medical History:     Past Medical History:   Diagnosis Date    Anxiety     Arthritis     Back pain     Bronchitis     Caffeine use     8 coffee / day    Carpal tunnel syndrome     Cataracts, bilateral     Constipation     Depression     Diarrhea     GERD (gastroesophageal reflux disease)     Hyperlipidemia     Hypertension     Mumps     MVP (mitral valve prolapse)     Dr. Hall Cutting in May 2019    Recurrent UTI     Dr. Etta Granda    Sinusitis     Ulcerative esophagitis     Wellness examination     Dr. Smooth Dwyer seen in Jan 2020       Past Surgical  History:     Past Surgical History:   Procedure Laterality Date    APPENDECTOMY      CARPAL TUNNEL RELEASE      COLONOSCOPY      CYSTOSCOPY      EGD  3/17/2020         EYE SURGERY      patircia IOL    GASTRIC FUNDOPLICATION N/A 8/49/0247    XI LAPAROSCOPIC ROBOTIC HIATAL HERNIA REPAIR, CHERYL FUNDOPLICATION performed by Aime Lomas MD at [facility] N/A 3/27/2020    EGD PEG TUBE PLACEMENT performed by Aime Lomas MD at [address] CATH POWER PICC TRIPLE  3/4/2020         HIATAL HERNIA REPAIR  02/24/2020    LAPAROSCOPIC ROBOTIC HIATAL HERNIA REPAIR, CHERYL FUNDOPLICATION     HYSTERECTOMY      Abdominal    JOINT REPLACEMENT Right     right hip    OVARY REMOVAL      RHINOPLASTY      TONSILLECTOMY      TOTAL HIP ARTHROPLASTY      TOTAL NEPHRECTOMY      atrophic from infections, age 13   24 [facility]"" TRACHEOSTOMY N/A 3/27/2020    **ADD ON **WANTS 10:00AM **TRACHEOTOMY performed by Sudeep Moore MD at [facility] N/A 3/17/2020    BEDSIDE EGD ESOPHAGOGASTRODUODENOSCOPY (ICU) performed by Sudeep Moore MD at [facility]       Medications:      amitriptyline  25 mg Oral TID    docusate  100 mg Oral BID    albuterol  2.5 mg Nebulization BID    mineral oil  45 mL Per G Tube Daily    polyethylene glycol  17 g Per G Tube Daily    midodrine  5 mg Oral TID WC    heparin (porcine)  5,000 Units Subcutaneous 3 times per day    furosemide  20 mg Intravenous Daily    ipratropium-albuterol  1 ampule Inhalation 4x daily    labetalol  10 mg Intravenous Once    gabapentin  300 mg Oral 3 times per day    insulin lispro  0-6 Units Subcutaneous Q6H    midazolam  2 mg Intravenous Once    sucralfate  1 g Oral 4 times per day    meropenem  1 g Intravenous Q8H    pantoprazole  40 mg Intravenous BID    And    sodium chloride (PF)  10 mL Intravenous BID    metoclopramide  10 mg Intravenous Q6H    bisacodyl  10 mg Rectal Daily    lidocaine 1 % injection  5 mL Intradermal Once    sodium chloride flush  10 mL Intravenous 2 times per day    traZODone  100 mg Oral Nightly    magnesium citrate  296 mL Oral Once    clonazePAM  0.5 mg Oral BID    [Held by provider] amLODIPine  10 mg Oral Nightly    sodium chloride flush  10 mL Intravenous 2 times per day    busPIRone  20 mg Oral TID    [Held by provider] metoprolol succinate  25 mg Oral Nightly    nicotine  1 patch Transdermal Daily    sodium chloride (PF)  10 mL Intravenous Daily    escitalopram  20 mg Oral Daily       Social History:     Social History     Socioeconomic History    Marital status:       Spouse name: Not on file    Number of children: 2    Years of education: Not on file    Highest education level: Not on file   Occupational History    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   Social Needs    Financial resource strain: Not on file    Food insecurity     Worry: Not on file     Inability: Not on file   [employer] needs     Medical: Not on file     Non-medical: Not on file   Tobacco Use    Smoking status: Current Every Day Smoker     Packs/day: 1.00     Years: 50.00     Pack years: 50.00     Types: Cigarettes    Smokeless tobacco: Never Used   Substance and Sexual Activity    Alcohol use: No    Drug use: No    Sexual activity: Never   Lifestyle    Physical activity     Days per week: Not on file     Minutes per session: Not on file    Stress: Not on file   Relationships    Social connections     Talks on phone: Not on file     Gets together: Not on file     Attends Judaism service: Not on file     Active member of club or organization: Not on file     Attends meetings of clubs or organizations: Not on file     Relationship status: Not on file    Intimate partner violence     Fear of current or ex partner: Not on file     Emotionally abused: Not on file     Physically abused: Not on file     Forced sexual activity: Not on file   Other Topics Concern    Not on file   Social History Narrative    Not on file       Family History:     Family History   Problem Relation Age of Onset    Cancer Mother         uterine    Heart Attack Mother     Heart Attack Father     Other Maternal Grandfather         foot gangrene    Other Paternal Grandmother         bleeding ulcers    Other Paternal Grandfather         lung cancer        Allergies:   Prednisone; Compazine [prochlorperazine maleate]; Penicillin v potassium; and Penicillins     Review of Systems:     3/31/2020 UTO, trached on vent, sedated. Constitutional: No fevers or chills. No systemic complaints  Head: No headaches  Eyes: No double vision or blurry vision.  No conjunctival

## 2020-03-30 NOTE — PROGRESS NOTES
Dr. Brodie Thurston at bedside with patient's son, Artem Burgess & pt's other son  (via phone call on speaker with other son), about patient's overall prognosis, plan of care and any other questions that the patient's sons had.

## 2020-03-30 NOTE — PROGRESS NOTES
Critical Care Team - Daily Progress Note      Date and time: 3/30/2020 7:05 AM  Patient's name:  Nikky Lara  Medical Record Number: [id number]  Patient's account/billing number: [de-identified]  Patient's YOB: 1945  Age: 76 y.o. Date of Admission: 2/29/2020 10:37 AM  Length of stay during current admission: 30  Primary Care Physician: David Menard MD  ICU Attending Physician: Dr. Daryl Díaz     Code Status: Full Code    Reason for ICU admission: respiratory failure and UGIB  Subjective:   OVERNIGHT EVENTS: Overnight patient was tachypneic again with RR 45. She was paralyzed again with vecuronium 9 mg. Precedex increased to 0.8. She is on PRVC mode 26/400/5/30. She is breathing over the vent with 33. She is sedated with fentanyl push 100 as needed, Versed 10 and Precedex 0.8. She is still on Levophed 3 she is maintaining her blood pressure. Tube feed through PEG tube. She is on TPN 65 as well. Feeding Diet: TPN 65, tube feed through PEG   Family: uptodate with current treatment plan   Analgesic: fentanyl 100 push PRN  Sedation: versed 10, precedex 0.8  Thrombo-prophylaxis: EPC Cuffs, heparin subcu 5000 unit. Mobility: none  Heads up: 30  Ulcer prophylaxis: Protonix 40 twice daily, Carafate 1 g every 6 hour, Reglan 10 every 6 hour. Glycemic control: good  Spontaneous breathing trial: not tolerating    Bowel regimen/urine output: NO BM/24 hours in spite of getting suppositories enemas and mineral oil. 3.2 L UOP/24H, No OG OP , - fluid balance of 336.     AWAKE & FOLLOWING COMMANDS:  [x] No   [] Yes    CURRENT VENTILATION STATUS:     [x] Ventilator  [] BIPAP  [] Nasal Cannula [] Room Air      IF INTUBATED, ET TUBE MARKING AT LOWER LIP:  24cms    SECRETIONS Amount:  [x] Small [] Moderate  [] Large  [] None  Color:     [x] White [] Colored  [] Bloody    SEDATION:  RAAS Score:  [] Propofol gtt  [x] Versed gtt  [] Ativan gtt   [] No Sedation    PARALYZED:  [x] No      DIARRHEA: mouth spray, 2 spray, PRN  metoprolol, 5 mg, Q6H PRN  ondansetron, 4 mg, Q6H PRN        SUPPORT DEVICES: [x] Ventilator [] BIPAP  [] Nasal Cannula [] Room Air    VENT SETTINGS (Comprehensive) (if applicable):  Vent Information  $Ventilation: $Subsequent Day  Ventilator Started: Yes  Ventilator Stopped: Yes  Skin Assessment: Clean, dry, & intact  Equipment ID: [id number]  Equipment Changed: HME  Vent Type: Servo i  Vent Mode: PRVC  Vt Ordered: 400 mL  Pressure Ordered: 0  Rate Set: 26 bmp  Pressure Support: 8 cmH20  FiO2 : (S) 30 %  Sensitivity: 1  PEEP/CPAP: 5  I Time/ I Time %: 0.85 s  Cuff Pressure (cm H2O): 20 cm H2O  Humidification Source: HME  Humidification Temp: 31  Humidification Temp Measured: 31  Circuit Condensation: Drained  Nitric Oxide/Epoprostenol In Use?: No  Additional Respiratory  Assessments  Pulse: 59  Resp: (!) 32  SpO2: 96 %  End Tidal CO2: 31 (%)  pCO2 (TCOM, mmHg): 36 mmHg  Position: Semi-Shelton's  Humidification Source: HME  Humidification Temp: 31  Circuit Condensation: Drained  Oral Care Completed?: Yes  Oral Care: Mouthwash, Mouth swabbed, Mouth moisturizer, Suction toothette  Subglottic Suction Done?: Yes  Cuff Pressure (cm H2O): 20 cm H2O  Skin barrier applied: No    ABGs:     Lab Results   Component Value Date    HCE8HDR 34 03/30/2020    FIO2 35.0 03/30/2020     Lactic Acid:   Lab Results   Component Value Date    LACTA 4.1 04/30/2018         DATA:  Complete Blood Count:   Recent Labs     03/28/20  0442 03/29/20  0412 03/30/20  0337   WBC 10.9 12.5* 12.7*   HGB 7.3* 7.7* 7.4*   MCV 97.2 96.2 96.4    319 330   RBC 2.46* 2.65* 2.52*   HCT 23.9* 25.5* 24.3*   MCH 29.7 29.1 29.4   MCHC 30.5 30.2 30.5   RDW 15.5* 15.4* 15.7*   MPV 10.9 10.8 10.8        PT/INR:    Lab Results   Component Value Date    PROTIME 10.0 03/27/2020    INR 1.0 03/27/2020     PTT:    Lab Results   Component Value Date    APTT 26.2 03/27/2020       Basal Metabolic Profile:   Recent Labs     03/28/20  0500 None drawn      [x]? Negative             []?  Positive (Details:  )  Urine Culture:                   []? None drawn      []? Negative             [x]? Positive (Details: Candida albicans)  Sputum Culture:               []? None drawn       []? Negative             [x]? Positive (Details: Candida albicans)   Endotracheal aspirate:     []? None drawn       []? Negative             [x]? Positive (Details: Candida albicans)   ASSESSMENT:     Active Problems:    Aspiration pneumonia (HCC)    Centrilobular emphysema (HCC)    Atelectasis    Pleural effusion    Esophageal dilatation    Upper GI bleeding    Acute on chronic blood loss anemia    Shock (HCC)    Fever    Acute postoperative respiratory insufficiency    Critical illness polyneuropathy ([facility] [address]  Resolved Problems:    * No resolved hospital problems. *    PLAN:   1. Acute hypoxic respiratory failure likely due to aspiration pneumonitis. Worsening, Intubated on 3/17,  S/p trach tube 3/27, On ARDS protocol. Wean down on precedex and start propofol. Continue versed and fentanyl. Follow up on CT chest. Continue Iv lasix 20 daily. 2. Acute blood loss anemia from upper GI bleed from GE junction ulcer on EGD 3/17. Stable now, S/p 2 PRBC rec'd so far. Hb stable at 7.4. Currently on Protonix 40 bid. Carafate 1 gm Qid and Reglan 10 Q6H. On TPN 65. Tube feed through PEG tube. 3. Hx of severe Anxiety and panic attacks. Continue with BuSpar, Lexapro, and trazodone. Ativan PRN. 4. Sepsis with shock likely due to underlying aspiration pneumonitis. Resolving, currently on levophed 3. tracheal aspirate positive for pseudohyphae/hyphae. Urine culture grew Candida. Blood cultures negative so far. completed course of diflucan 400 stop date 3-26. Currently on IV meropenem Stop date 3-30-2. ID following along. 5. S/p hiatal hernia repair and Nissen fundoplication on 3/25/1886, s/p PEG tube 3/27. Surgery following along.       Nayana Good MD.  Internal Medicine Resident   [id number] [facility]

## 2020-03-30 NOTE — PLAN OF CARE
Problem: Pain:  Goal: Pain level will decrease  Description: Pain level will decrease  Outcome: Ongoing     Problem: Pain:  Goal: Control of acute pain  Description: Control of acute pain  Outcome: Ongoing     Problem: Pain:  Goal: Control of chronic pain  Description: Control of chronic pain  Outcome: Ongoing     Problem: Discharge Planning:  Goal: Participates in care planning  Description: Participates in care planning  Outcome: Ongoing     Problem: Discharge Planning:  Goal: Discharged to appropriate level of care  Description: Discharged to appropriate level of care  Outcome: Ongoing     Problem: Anxiety/Stress:  Goal: Level of anxiety will decrease  Description: Level of anxiety will decrease  Outcome: Ongoing     Problem:  Bowel Function - Altered:  Goal: Bowel elimination is within specified parameters  Description: Bowel elimination is within specified parameters  Outcome: Ongoing     Problem: Nutrition Deficit:  Goal: Ability to achieve adequate nutritional intake will improve  Description: Ability to achieve adequate nutritional intake will improve  Outcome: Ongoing     Problem: Pain:  Goal: Recognizes and communicates pain  Description: Recognizes and communicates pain  Outcome: Ongoing     Problem: Pain:  Goal: Control of acute pain  Description: Control of acute pain  Outcome: Ongoing     Problem: Pain:  Goal: Control of chronic pain  Description: Control of chronic pain  Outcome: Ongoing     Problem: Skin Integrity - Impaired:  Goal: Will show no infection signs and symptoms  Description: Will show no infection signs and symptoms  Outcome: Ongoing     Problem: Skin Integrity - Impaired:  Goal: Absence of new skin breakdown  Description: Absence of new skin breakdown  Outcome: Ongoing     Problem: Sleep Pattern Disturbance:  Goal: Appears well-rested  Description: Appears well-rested  Outcome: Ongoing     Problem: Risk for Impaired Skin Integrity  Goal: Tissue integrity - skin and mucous membranes  Description: Structural intactness and normal physiological function of skin and  mucous membranes.   Outcome: Ongoing     Problem: Falls - Risk of:  Goal: Will remain free from falls  Description: Will remain free from falls  Outcome: Ongoing     Problem: Falls - Risk of:  Goal: Absence of physical injury  Description: Absence of physical injury  Outcome: Ongoing     Problem: OXYGENATION/RESPIRATORY FUNCTION  Goal: Patient will maintain patent airway  3/30/2020 1045 by Ean Burton RN  Outcome: Ongoing     Problem: OXYGENATION/RESPIRATORY FUNCTION  Goal: Patient will achieve/maintain normal respiratory rate/effort  Description: Respiratory rate and effort will be within normal limits for the patient  3/30/2020 1045 by Ean Burton RN  Outcome: Ongoing     Problem: MECHANICAL VENTILATION  Goal: Patient will maintain patent airway  3/30/2020 1045 by Ean Burton RN  Outcome: Ongoing     Problem: MECHANICAL VENTILATION  Goal: Oral health is maintained or improved  3/30/2020 1045 by Ean Burton RN  Outcome: Ongoing     Problem: MECHANICAL VENTILATION  Goal: Ability to express needs and understand communication  3/30/2020 1045 by Ean Burton RN  Outcome: Ongoing     Problem: MECHANICAL VENTILATION  Goal: Mobility/activity is maintained at optimum level for patient  3/30/2020 1045 by Ean Burton RN  Outcome: Ongoing     Problem: MECHANICAL VENTILATION  Goal: Tracheostomy will be managed safely  3/30/2020 1045 by Ean Burton RN  Outcome: Ongoing     Problem: SKIN INTEGRITY  Goal: Skin integrity is maintained or improved  Outcome: Ongoing     Problem: Nutrition  Goal: Optimal nutrition therapy  Description: Nutrition Problem: Inadequate oral intake  Intervention: Food and/or Nutrient Delivery: Continue Parenteral Nutrition  Nutritional Goals: meet % of estimated nutrition needs    Outcome: Ongoing     Problem: Musculor/Skeletal Functional Status  Goal: Highest potential functional level  Outcome: Ongoing

## 2020-03-31 ENCOUNTER — APPOINTMENT (OUTPATIENT)
Dept: GENERAL RADIOLOGY | Age: 75
DRG: 004 | End: 2020-03-31
Attending: INTERNAL MEDICINE
Payer: MEDICARE

## 2020-03-31 LAB
ABSOLUTE EOS #: 0.1 K/UL (ref 0–0.4)
ABSOLUTE IMMATURE GRANULOCYTE: 0.1 K/UL (ref 0–0.3)
ABSOLUTE LYMPH #: 1.96 K/UL (ref 1–4.8)
ABSOLUTE MONO #: 0.72 K/UL (ref 0.1–0.8)
ALLEN TEST: ABNORMAL
BASOPHILS # BLD: 0 % (ref 0–2)
BASOPHILS ABSOLUTE: 0 K/UL (ref 0–0.2)
DIFFERENTIAL TYPE: ABNORMAL
EOSINOPHILS RELATIVE PERCENT: 1 % (ref 1–4)
FIO2: 40
GLUCOSE BLD-MCNC: 103 MG/DL (ref 65–105)
GLUCOSE BLD-MCNC: 110 MG/DL (ref 65–105)
GLUCOSE BLD-MCNC: 112 MG/DL (ref 65–105)
GLUCOSE BLD-MCNC: 113 MG/DL (ref 65–105)
HCT VFR BLD CALC: 23.9 % (ref 36.3–47.1)
HEMOGLOBIN: 7.2 G/DL (ref 11.9–15.1)
IMMATURE GRANULOCYTES: 1 %
LACTATE DEHYDROGENASE: 200 U/L (ref 135–214)
LYMPHOCYTES # BLD: 19 % (ref 24–44)
MCH RBC QN AUTO: 29.5 PG (ref 25.2–33.5)
MCHC RBC AUTO-ENTMCNC: 30.1 G/DL (ref 28.4–34.8)
MCV RBC AUTO: 98 FL (ref 82.6–102.9)
MODE: ABNORMAL
MONOCYTES # BLD: 7 % (ref 1–7)
MORPHOLOGY: ABNORMAL
MORPHOLOGY: ABNORMAL
NEGATIVE BASE EXCESS, ART: ABNORMAL (ref 0–2)
NRBC AUTOMATED: 0.4 PER 100 WBC
O2 DEVICE/FLOW/%: ABNORMAL
PATIENT TEMP: ABNORMAL
PDW BLD-RTO: 16.1 % (ref 11.8–14.4)
PLATELET # BLD: 349 K/UL (ref 138–453)
PLATELET ESTIMATE: ABNORMAL
PMV BLD AUTO: 11 FL (ref 8.1–13.5)
POC HCO3: 33.7 MMOL/L (ref 21–28)
POC O2 SATURATION: 98 % (ref 94–98)
POC PCO2 TEMP: ABNORMAL MM HG
POC PCO2: 46.7 MM HG (ref 35–48)
POC PH TEMP: ABNORMAL
POC PH: 7.47 (ref 7.35–7.45)
POC PO2 TEMP: ABNORMAL MM HG
POC PO2: 100.6 MM HG (ref 83–108)
POSITIVE BASE EXCESS, ART: 9 (ref 0–3)
RBC # BLD: 2.44 M/UL (ref 3.95–5.11)
RBC # BLD: ABNORMAL 10*6/UL
SAMPLE SITE: ABNORMAL
SEG NEUTROPHILS: 72 % (ref 36–66)
SEGMENTED NEUTROPHILS ABSOLUTE COUNT: 7.42 K/UL (ref 1.8–7.7)
TCO2 (CALC), ART: 35 MMOL/L (ref 22–29)
TOTAL PROTEIN: 6.2 G/DL (ref 6.4–8.3)
TRIGL SERPL-MCNC: 303 MG/DL
WBC # BLD: 10.3 K/UL (ref 3.5–11.3)
WBC # BLD: ABNORMAL 10*3/UL

## 2020-03-31 PROCEDURE — 2580000003 HC RX 258

## 2020-03-31 PROCEDURE — 6370000000 HC RX 637 (ALT 250 FOR IP): Performed by: STUDENT IN AN ORGANIZED HEALTH CARE EDUCATION/TRAINING PROGRAM

## 2020-03-31 PROCEDURE — 94640 AIRWAY INHALATION TREATMENT: CPT

## 2020-03-31 PROCEDURE — 2580000003 HC RX 258: Performed by: STUDENT IN AN ORGANIZED HEALTH CARE EDUCATION/TRAINING PROGRAM

## 2020-03-31 PROCEDURE — 83615 LACTATE (LD) (LDH) ENZYME: CPT

## 2020-03-31 PROCEDURE — 6360000002 HC RX W HCPCS: Performed by: INTERNAL MEDICINE

## 2020-03-31 PROCEDURE — 94770 HC ETCO2 MONITOR DAILY: CPT

## 2020-03-31 PROCEDURE — C9113 INJ PANTOPRAZOLE SODIUM, VIA: HCPCS | Performed by: STUDENT IN AN ORGANIZED HEALTH CARE EDUCATION/TRAINING PROGRAM

## 2020-03-31 PROCEDURE — 2500000003 HC RX 250 WO HCPCS: Performed by: STUDENT IN AN ORGANIZED HEALTH CARE EDUCATION/TRAINING PROGRAM

## 2020-03-31 PROCEDURE — 36415 COLL VENOUS BLD VENIPUNCTURE: CPT

## 2020-03-31 PROCEDURE — 37799 UNLISTED PX VASCULAR SURGERY: CPT

## 2020-03-31 PROCEDURE — 82947 ASSAY GLUCOSE BLOOD QUANT: CPT

## 2020-03-31 PROCEDURE — 82803 BLOOD GASES ANY COMBINATION: CPT

## 2020-03-31 PROCEDURE — 99291 CRITICAL CARE FIRST HOUR: CPT | Performed by: INTERNAL MEDICINE

## 2020-03-31 PROCEDURE — 2580000003 HC RX 258: Performed by: INTERNAL MEDICINE

## 2020-03-31 PROCEDURE — 2500000003 HC RX 250 WO HCPCS

## 2020-03-31 PROCEDURE — 84155 ASSAY OF PROTEIN SERUM: CPT

## 2020-03-31 PROCEDURE — 94003 VENT MGMT INPAT SUBQ DAY: CPT

## 2020-03-31 PROCEDURE — 99233 SBSQ HOSP IP/OBS HIGH 50: CPT | Performed by: INTERNAL MEDICINE

## 2020-03-31 PROCEDURE — 6360000002 HC RX W HCPCS: Performed by: STUDENT IN AN ORGANIZED HEALTH CARE EDUCATION/TRAINING PROGRAM

## 2020-03-31 PROCEDURE — 85025 COMPLETE CBC W/AUTO DIFF WBC: CPT

## 2020-03-31 PROCEDURE — 84478 ASSAY OF TRIGLYCERIDES: CPT

## 2020-03-31 PROCEDURE — 94761 N-INVAS EAR/PLS OXIMETRY MLT: CPT

## 2020-03-31 PROCEDURE — 2000000000 HC ICU R&B

## 2020-03-31 PROCEDURE — 2700000000 HC OXYGEN THERAPY PER DAY

## 2020-03-31 RX ORDER — VECURONIUM BROMIDE 1 MG/ML
INJECTION, POWDER, LYOPHILIZED, FOR SOLUTION INTRAVENOUS
Status: COMPLETED
Start: 2020-03-31 | End: 2020-03-31

## 2020-03-31 RX ORDER — VECURONIUM BROMIDE 1 MG/ML
9 INJECTION, POWDER, LYOPHILIZED, FOR SOLUTION INTRAVENOUS ONCE
Status: COMPLETED | OUTPATIENT
Start: 2020-03-31 | End: 2020-03-31

## 2020-03-31 RX ORDER — VECURONIUM BROMIDE 1 MG/ML
9 INJECTION, POWDER, LYOPHILIZED, FOR SOLUTION INTRAVENOUS ONCE
Status: DISCONTINUED | OUTPATIENT
Start: 2020-03-31 | End: 2020-03-31

## 2020-03-31 RX ADMIN — Medication 10 MG/HR: at 16:59

## 2020-03-31 RX ADMIN — DOCUSATE SODIUM 100 MG: 50 LIQUID ORAL at 21:02

## 2020-03-31 RX ADMIN — MEROPENEM 1 G: 1 INJECTION, POWDER, FOR SOLUTION INTRAVENOUS at 04:23

## 2020-03-31 RX ADMIN — PROPOFOL 25 MCG/KG/MIN: 10 INJECTION, EMULSION INTRAVENOUS at 09:33

## 2020-03-31 RX ADMIN — GABAPENTIN 300 MG: 250 SOLUTION ORAL at 23:01

## 2020-03-31 RX ADMIN — HEPARIN SODIUM 5000 UNITS: 5000 INJECTION INTRAVENOUS; SUBCUTANEOUS at 13:19

## 2020-03-31 RX ADMIN — SUCRALFATE 1 G: 1 TABLET ORAL at 13:03

## 2020-03-31 RX ADMIN — GABAPENTIN 300 MG: 250 SOLUTION ORAL at 13:17

## 2020-03-31 RX ADMIN — METOCLOPRAMIDE 10 MG: 5 INJECTION, SOLUTION INTRAMUSCULAR; INTRAVENOUS at 13:03

## 2020-03-31 RX ADMIN — FENTANYL CITRATE 100 MCG: 50 INJECTION, SOLUTION INTRAMUSCULAR; INTRAVENOUS at 15:57

## 2020-03-31 RX ADMIN — PANTOPRAZOLE SODIUM 40 MG: 40 INJECTION, POWDER, FOR SOLUTION INTRAVENOUS at 21:02

## 2020-03-31 RX ADMIN — ALBUTEROL SULFATE 2.5 MG: 2.5 SOLUTION RESPIRATORY (INHALATION) at 03:18

## 2020-03-31 RX ADMIN — MIDODRINE HYDROCHLORIDE 5 MG: 5 TABLET ORAL at 13:05

## 2020-03-31 RX ADMIN — PROPOFOL 45 MCG/KG/MIN: 10 INJECTION, EMULSION INTRAVENOUS at 15:33

## 2020-03-31 RX ADMIN — METOCLOPRAMIDE 10 MG: 5 INJECTION, SOLUTION INTRAMUSCULAR; INTRAVENOUS at 18:36

## 2020-03-31 RX ADMIN — MIDODRINE HYDROCHLORIDE 5 MG: 5 TABLET ORAL at 18:35

## 2020-03-31 RX ADMIN — HEPARIN SODIUM 5000 UNITS: 5000 INJECTION INTRAVENOUS; SUBCUTANEOUS at 21:03

## 2020-03-31 RX ADMIN — CLONAZEPAM 0.5 MG: 0.5 TABLET ORAL at 20:58

## 2020-03-31 RX ADMIN — FENTANYL CITRATE 100 MCG: 50 INJECTION, SOLUTION INTRAMUSCULAR; INTRAVENOUS at 05:57

## 2020-03-31 RX ADMIN — SODIUM CHLORIDE, PRESERVATIVE FREE 10 ML: 5 INJECTION INTRAVENOUS at 00:11

## 2020-03-31 RX ADMIN — MEROPENEM 1 G: 1 INJECTION, POWDER, FOR SOLUTION INTRAVENOUS at 20:58

## 2020-03-31 RX ADMIN — SUCRALFATE 1 G: 1 TABLET ORAL at 18:49

## 2020-03-31 RX ADMIN — Medication 10 ML: at 09:32

## 2020-03-31 RX ADMIN — Medication 10 ML: at 23:01

## 2020-03-31 RX ADMIN — IPRATROPIUM BROMIDE AND ALBUTEROL SULFATE 1 AMPULE: .5; 3 SOLUTION RESPIRATORY (INHALATION) at 07:45

## 2020-03-31 RX ADMIN — AMITRIPTYLINE HYDROCHLORIDE 25 MG: 25 TABLET, FILM COATED ORAL at 23:00

## 2020-03-31 RX ADMIN — METOPROLOL TARTRATE 5 MG: 5 INJECTION, SOLUTION INTRAVENOUS at 21:23

## 2020-03-31 RX ADMIN — SODIUM CHLORIDE, PRESERVATIVE FREE 10 ML: 5 INJECTION INTRAVENOUS at 23:02

## 2020-03-31 RX ADMIN — PROPOFOL 50 MCG/KG/MIN: 10 INJECTION, EMULSION INTRAVENOUS at 20:07

## 2020-03-31 RX ADMIN — PROPOFOL 50 MCG/KG/MIN: 10 INJECTION, EMULSION INTRAVENOUS at 23:09

## 2020-03-31 RX ADMIN — ALBUTEROL SULFATE 2.5 MG: 2.5 SOLUTION RESPIRATORY (INHALATION) at 23:55

## 2020-03-31 RX ADMIN — METOCLOPRAMIDE 10 MG: 5 INJECTION, SOLUTION INTRAMUSCULAR; INTRAVENOUS at 05:57

## 2020-03-31 RX ADMIN — PANTOPRAZOLE SODIUM 40 MG: 40 INJECTION, POWDER, FOR SOLUTION INTRAVENOUS at 09:28

## 2020-03-31 RX ADMIN — METOCLOPRAMIDE 10 MG: 5 INJECTION, SOLUTION INTRAMUSCULAR; INTRAVENOUS at 00:04

## 2020-03-31 RX ADMIN — VECURONIUM BROMIDE 9 MG: 10 INJECTION, POWDER, LYOPHILIZED, FOR SOLUTION INTRAVENOUS at 15:35

## 2020-03-31 RX ADMIN — Medication 10 ML: at 09:33

## 2020-03-31 RX ADMIN — VECURONIUM BROMIDE 9 MG: 10 INJECTION, POWDER, FOR SOLUTION INTRAVENOUS at 20:51

## 2020-03-31 RX ADMIN — FENTANYL CITRATE 100 MCG: 50 INJECTION, SOLUTION INTRAMUSCULAR; INTRAVENOUS at 20:07

## 2020-03-31 RX ADMIN — IPRATROPIUM BROMIDE AND ALBUTEROL SULFATE 1 AMPULE: .5; 3 SOLUTION RESPIRATORY (INHALATION) at 15:26

## 2020-03-31 RX ADMIN — WATER 10 ML: 1 INJECTION INTRAMUSCULAR; INTRAVENOUS; SUBCUTANEOUS at 21:03

## 2020-03-31 RX ADMIN — IPRATROPIUM BROMIDE AND ALBUTEROL SULFATE 1 AMPULE: .5; 3 SOLUTION RESPIRATORY (INHALATION) at 11:07

## 2020-03-31 RX ADMIN — BUSPIRONE HYDROCHLORIDE 20 MG: 10 TABLET ORAL at 23:00

## 2020-03-31 RX ADMIN — HEPARIN SODIUM 5000 UNITS: 5000 INJECTION INTRAVENOUS; SUBCUTANEOUS at 05:57

## 2020-03-31 RX ADMIN — IPRATROPIUM BROMIDE AND ALBUTEROL SULFATE 1 AMPULE: .5; 3 SOLUTION RESPIRATORY (INHALATION) at 20:13

## 2020-03-31 RX ADMIN — BUSPIRONE HYDROCHLORIDE 20 MG: 10 TABLET ORAL at 13:12

## 2020-03-31 RX ADMIN — Medication 10 MG/HR: at 07:59

## 2020-03-31 RX ADMIN — GABAPENTIN 300 MG: 250 SOLUTION ORAL at 06:37

## 2020-03-31 RX ADMIN — VECURONIUM BROMIDE 9 MG: 1 INJECTION, POWDER, LYOPHILIZED, FOR SOLUTION INTRAVENOUS at 20:51

## 2020-03-31 RX ADMIN — POTASSIUM CHLORIDE: 2 INJECTION, SOLUTION, CONCENTRATE INTRAVENOUS at 18:01

## 2020-03-31 RX ADMIN — SUCRALFATE 1 G: 1 TABLET ORAL at 00:04

## 2020-03-31 RX ADMIN — AMITRIPTYLINE HYDROCHLORIDE 25 MG: 25 TABLET, FILM COATED ORAL at 13:15

## 2020-03-31 RX ADMIN — DEXMEDETOMIDINE HYDROCHLORIDE 0.9 MCG/HR: 100 INJECTION, SOLUTION INTRAVENOUS at 05:55

## 2020-03-31 RX ADMIN — PROPOFOL 25 MCG/KG/MIN: 10 INJECTION, EMULSION INTRAVENOUS at 03:00

## 2020-03-31 RX ADMIN — MEROPENEM 1 G: 1 INJECTION, POWDER, FOR SOLUTION INTRAVENOUS at 12:25

## 2020-03-31 RX ADMIN — FENTANYL CITRATE 100 MCG: 50 INJECTION, SOLUTION INTRAMUSCULAR; INTRAVENOUS at 13:43

## 2020-03-31 RX ADMIN — ANTI-FUNGAL POWDER MICONAZOLE NITRATE TALC FREE: 1.42 POWDER TOPICAL at 23:02

## 2020-03-31 RX ADMIN — ACETAMINOPHEN 650 MG: 325 TABLET ORAL at 20:58

## 2020-03-31 RX ADMIN — SUCRALFATE 1 G: 1 TABLET ORAL at 05:57

## 2020-03-31 RX ADMIN — POLYETHYLENE GLYCOL 3350 17 G: 17 POWDER, FOR SOLUTION ORAL at 09:02

## 2020-03-31 RX ADMIN — BISACODYL 10 MG: 10 SUPPOSITORY RECTAL at 13:08

## 2020-03-31 RX ADMIN — FENTANYL CITRATE 100 MCG: 50 INJECTION, SOLUTION INTRAMUSCULAR; INTRAVENOUS at 00:51

## 2020-03-31 ASSESSMENT — PULMONARY FUNCTION TESTS
PIF_VALUE: 21
PIF_VALUE: 18
PIF_VALUE: 21
PIF_VALUE: 23
PIF_VALUE: 12
PIF_VALUE: 18
PIF_VALUE: 22

## 2020-03-31 ASSESSMENT — PAIN SCALES - GENERAL: PAINLEVEL_OUTOF10: 0

## 2020-03-31 NOTE — PROGRESS NOTES
Critical Care Team - Daily Progress Note      Date and time: 3/31/2020 7:41 AM  Patient's name:  Umair Hutchinson  Medical Record Number: [id number]  Patient's account/billing number: [de-identified]  Patient's YOB: 1945  Age: 76 y.o. Date of Admission: 2/29/2020 10:37 AM  Length of stay during current admission: 31  Primary Care Physician: Bart Simons MD  ICU Attending Physician: Dr. Janett Haley Status: Full Code    Reason for ICU admission: respiratory failure and UGIB    Subjective:   OVERNIGHT EVENTS: patient had an episode of green emesis yesterday and overnight. Tube feeds held. Patient is still not responding to commands. On PRVC mode 26/400/8/40, she is breathing over the vent with 33. She is sedated with fentanyl 50/100 push PRN, propofol 25 and Precedex 0.9. ABG today   7.46/46/100/33. She is still on Levophed 2. CT chest without contrast done yesterday showed   Multifocal ground-glass opacities and patchy basilar opacities favor pneumonia and edema. The nodular opacity in the left lower lung is most likely acute airspace disease given its interval development from prior exam.  Moderate bilateral pleural effusions. Background pneumonitis or edema are considered.     AWAKE & FOLLOWING COMMANDS:  [x] No   [] Yes    CURRENT VENTILATION STATUS:     [x] Ventilator  [] BIPAP  [] Nasal Cannula [] Room Air      IF INTUBATED, ET TUBE MARKING AT LOWER LIP:  24cms    SECRETIONS Amount:  [] Small [x] Moderate  [] Large  [] None  Color:     [x] White [] Colored  [] Bloody    SEDATION:  RAAS Score:  [] Propofol gtt  [x] Versed gtt  [] Ativan gtt   [] No Sedation    PARALYZED:  [x] No      DIARRHEA:                [x] No                 VASOPRESSORS:  [x] yes      CENTRAL LINES:     [x] No      DUDLEY'S CATHETER:   [] No   [x] Yes  (Date of Insertion:   )     URINE OUTPUT:            [x] Good   [] Low              [] Anuric    REVIEW OF SYSTEMS:  Not obtained as patient is intubated    OBJECTIVE: 10 mL, PRN  potassium chloride, 40 mEq, PRN    Or  potassium alternative oral replacement, 40 mEq, PRN    Or  potassium chloride, 10 mEq, PRN  magnesium hydroxide, 30 mL, Daily PRN  sodium chloride flush, 10 mL, PRN  glycerin moisturizing mouth spray, 2 spray, PRN  metoprolol, 5 mg, Q6H PRN  ondansetron, 4 mg, Q6H PRN        SUPPORT DEVICES: [x] Ventilator [] BIPAP  [] Nasal Cannula [] Room Air    VENT SETTINGS (Comprehensive) (if applicable):  Vent Information  $Ventilation: $Subsequent Day  Ventilator Started: Yes  Ventilator Stopped: Yes  Skin Assessment: Clean, dry, & intact  Equipment ID: [id number]  Equipment Changed: HME  Vent Type: Servo i  Vent Mode: PRVC  Vt Ordered: 400 mL  Pressure Ordered: 0  Rate Set: 26 bmp  Pressure Support: 8 cmH20  FiO2 : 40 %  Sensitivity: 1  PEEP/CPAP: 8  I Time/ I Time %: 0.7 s  Cuff Pressure (cm H2O): 20 cm H2O  Humidification Source: HME  Humidification Temp: 31  Humidification Temp Measured: 31  Circuit Condensation: Drained  Nitric Oxide/Epoprostenol In Use?: No  Additional Respiratory  Assessments  Pulse: 59  Resp: (!) 33  SpO2: 100 %  End Tidal CO2: 34 (%)  pCO2 (TCOM, mmHg): 36 mmHg  Position: Semi-Shelton's  Humidification Source: HME  Humidification Temp: 31  Circuit Condensation: Drained  Oral Care Completed?: Yes  Oral Care: Mouthwash, Lip moisturizer applied, Mouth moisturizer, Mouth suctioned  Subglottic Suction Done?: Yes  Cuff Pressure (cm H2O): 20 cm H2O  Skin barrier applied: No    ABGs:     Lab Results   Component Value Date    QDH0QIA 35 03/31/2020    FIO2 40.0 03/31/2020     Lactic Acid:   Lab Results   Component Value Date    LACTA 4.1 04/30/2018     DATA:  Complete Blood Count:   Recent Labs     03/29/20  0412 03/30/20  0337 03/31/20  0447   WBC 12.5* 12.7* 10.3   HGB 7.7* 7.4* 7.2*   MCV 96.2 96.4 98.0    330 349   RBC 2.65* 2.52* 2.44*   HCT 25.5* 24.3* 23.9*   MCH 29.1 29.4 29.5   MCHC 30.2 30.5 30.1   RDW 15.4* 15.7* 16.1*   MPV 10.8 10.8 11.0 PT/INR:    Lab Results   Component Value Date    PROTIME 10.0 03/27/2020    INR 1.0 03/27/2020     PTT:    Lab Results   Component Value Date    APTT 26.2 03/27/2020       Basal Metabolic Profile:   Recent Labs     03/29/20  0412 03/30/20  0338    137   K 4.2 3.7   BUN 22 26*   CREATININE 0.44* 0.42*   CL 98 98   CO2 29 29      Magnesium:   Lab Results   Component Value Date    MG 2.1 03/30/2020    MG 2.0 03/27/2020    MG 1.9 03/25/2020     Phosphorus:   Lab Results   Component Value Date    PHOS 3.6 03/30/2020    PHOS 3.3 03/27/2020    PHOS 3.2 03/25/2020     S. Calcium:  Recent Labs     03/30/20 0338   CALCIUM 9.3     S. Ionized Calcium:No results for input(s): IONCA in the last 72 hours. Urinalysis:   Lab Results   Component Value Date    NITRU NEGATIVE 03/17/2020    COLORU YELLOW 03/17/2020    PHUR 5.5 03/17/2020    WBCUA 0 TO 2 03/17/2020    RBCUA 20 TO 50 03/17/2020    MUCUS NOT REPORTED 03/17/2020    TRICHOMONAS NOT REPORTED 03/17/2020    YEAST MANY 03/17/2020    BACTERIA NOT REPORTED 03/17/2020    CLARITYU Clear 06/29/2018    SPECGRAV 1.013 03/17/2020    LEUKOCYTESUR NEGATIVE 03/17/2020    UROBILINOGEN Normal 03/17/2020    BILIRUBINUR NEGATIVE 03/17/2020    BILIRUBINUR Negative 06/29/2018    BLOODU Non-hemolyzed trace 06/29/2018    GLUCOSEU NEGATIVE 03/17/2020    KETUA NEGATIVE 03/17/2020    AMORPHOUS NOT REPORTED 03/17/2020       CARDIAC ENZYMES: No results for input(s): CKMB, CKMBINDEX, TROPONINI in the last 72 hours. Invalid input(s): CKTOTAL;3  BNP: No results for input(s): BNP in the last 72 hours. LFTS  No results for input(s): ALKPHOS, ALT, AST, BILITOT, BILIDIR, LABALBU in the last 72 hours. AMYLASE/LIPASE/AMMONIA  No results for input(s): AMYLASE, LIPASE, AMMONIA in the last 72 hours.     Last 3 Blood Glucose:   Recent Labs     03/29/20  0412 03/30/20  0338   GLUCOSE 130* 159*      HgBA1c:  No results found for: LABA1C      TSH:  No results found for: TSH  ANEMIA STUDIES  No along.  5. S/p hiatal hernia repair and Nissen fundoplication on 2/23/0923, s/p PEG tube 3/27. Surgery following along. 6. Emesis. Peg tube feed on hold now.          Kayley Moreno MD.  Internal Medicine Resident   [facility]   3/31/2020, 7:41 AM

## 2020-03-31 NOTE — PROGRESS NOTES
[facility]  Provided information about hospice  Validating patient/family distress  Continued communication updates    Will continue to support family and patient. Please contact via perfect serve with any issues.     Electronically signed by   Jamshid Torres RN  Palliative Care Team  on 3/31/2020 at 10:09 AM

## 2020-03-31 NOTE — PLAN OF CARE
Risk of:  Goal: Will remain free from falls  Description: Will remain free from falls  3/31/2020 1024 by Batsheva Teague RN  Outcome: Ongoing  3/31/2020 0647 by Maribel Last RN  Outcome: Ongoing

## 2020-03-31 NOTE — FLOWSHEET NOTE
SPIRITUAL CARE PROGRESS NOTE    Spiritual Assessment:  contacted patient's son Elodia Vaughn) following a request from another  and a nurse to see how he was doing with his mother in the hospital.  When  called patient's son, he shared he was doing as well as he could be.  asked patient's son about support he has and patient's son shared he has emotional support through family. Patient's son did not exhibit being in any emotional or spiritual distress during the phone call. Intervention:  provided active listening to patient's son and was a compassionate spiritual presence. Outcome: Chaplains will remain available to offer spiritual and emotional support as needed. 03/31/20 1543   Encounter Summary   Services provided to: Family   Referral/Consult From: Other    Support System Children   Continue Visiting   (3/31/2020)   Complexity of Encounter Moderate   Length of Encounter 15 minutes   Routine   Type Follow up   Assessment Approachable;Calm   Intervention Active listening;Explored feelings, thoughts, concerns   Outcome Comfort; Acceptance       Electronically signed by Isaac Vargas on 3/31/2020 at 3:48 PM.  [facility]  [phone]

## 2020-03-31 NOTE — DISCHARGE SUMMARY
Physician Discharge Summary     Patient ID:  Raman Meza  [id number]  76 y.o.  1945    Admit date: 2/24/2020    Discharge date and time: 2/28/2020 12:24 PM     Admitting Physician: Melisa Coates MD     Discharge Physician: aMya Fuentes    Admission Diagnoses: Paraesophageal hernia [K44.9]  History of repair of hiatal hernia [Z98.890, Z87.19]    Discharge Diagnoses: s/p paraesophageal hernia repair    Admission Condition: good    Discharged Condition: good    Indication for Admission: s/p hernia repair    Hospital Course: Complicated by PNA    Consults: none    Significant Diagnostic Studies: labs    Treatments: surgery    Discharge Exam:  /63   Pulse 90   Temp 98.2 °F (36.8 °C) (Oral)   Resp 16   Ht 5' 2\" (1.575 m)   Wt 183 lb 6.4 oz (83.2 kg)   SpO2 93%   BMI 33.54 kg/m²     General Appearance:    Alert, cooperative, no distress, appears stated age   Head:    Normocephalic, without obvious abnormality, atraumatic   Eyes:    PERRL, conjunctiva/corneas clear, EOM's intact, fundi     benign, both eyes   Ears:    Normal TM's and external ear canals, both ears   Nose:   Nares normal, septum midline, mucosa normal, no drainage    or sinus tenderness   Throat:   Lips, mucosa, and tongue normal; teeth and gums normal   Neck:   Supple, symmetrical, trachea midline, no adenopathy;     thyroid:  no enlargement/tenderness/nodules; no carotid    bruit or JVD   Back:     Symmetric, no curvature, ROM normal, no CVA tenderness   Lungs:     Clear to auscultation bilaterally, respirations unlabored   Chest Wall:    No tenderness or deformity    Heart:    Regular rate and rhythm, S1 and S2 normal, no murmur, rub   or gallop   Breast Exam:    No tenderness, masses, or nipple abnormality   Abdomen:     Soft, non-tender, bowel sounds active all four quadrants,     no masses, no organomegaly   Genitalia:    Normal female without lesion, discharge or tenderness   Rectal:    Normal tone ;guaiac negative stool   Extremities: Extremities normal, atraumatic, no cyanosis or edema   Pulses:   2+ and symmetric all extremities   Skin:   Skin color, texture, turgor normal, no rashes or lesions   Lymph nodes:   Cervical, supraclavicular, and axillary nodes normal   Neurologic:   CNII-XII intact, normal strength, sensation and reflexes     throughout       Disposition: home    In process/preliminary results:  Outstanding Order Results     No orders found from 1/26/2020 to 2/25/2020. Patient Instructions:   Discharge Medication List as of 2/28/2020  9:29 AM      START taking these medications    Details   oxyCODONE (ROXICODONE) 5 MG immediate release tablet Take 1 tablet by mouth every 6 hours as needed for Pain for up to 7 days. Intended supply: 7 days. Take lowest dose possible to manage pain, Disp-28 tablet, R-0Print         CONTINUE these medications which have CHANGED    Details   ondansetron (ZOFRAN) 4 MG tablet Take 1 tablet by mouth daily as needed for Nausea or Vomiting, Disp-30 tablet, R-0Print      pantoprazole (PROTONIX) 40 MG tablet Take 1 tablet by mouth every morning (before breakfast), Disp-30 tablet, R-5Print         CONTINUE these medications which have NOT CHANGED    Details   RA VITAMIN D-3 50 MCG (2000 UT) CAPS take 1 capsule by mouth once daily, DAWHistorical Med      famotidine (PEPCID) 10 MG tablet Take 10 mg by mouth nightlyHistorical Med      amitriptyline (ELAVIL) 25 MG tablet Take 10 mg by mouth three times dailyHistorical Med      amLODIPine (NORVASC) 10 MG tablet Take 10 mg by mouth nightlyHistorical Med      Oyster Shell 500 MG TABS Take 500 mg by mouth 2 times daily Historical Med      clonazePAM (KLONOPIN) 0.5 MG tablet Take 0.5 mg by mouth 2 times daily.  Taking 1/2 tablet BIDHistorical Med      polyethylene glycol (MIRALAX) PACK packet Take 17 g by mouth daily as neededHistorical Med      Acetaminophen 500 MG CAPS Take 1 tablet by mouth as needed for Pain (follow OTC instructions)Historical Med

## 2020-03-31 NOTE — FLOWSHEET NOTE
Dr. Hilary Yi here called the son Deboraha Mcburney and reviewed overall condition, explained about some asychronous and rapid breathing that may in time resolve and recover, and as well perhaps some additional time for bowel recovery; Deboraha Mcburney was understanding and stated that he was in agreement with Dr. Remington Perales to continue supportive care.  I was present for his conversation as well as Dr. Alfredo Vizcaino near by

## 2020-04-01 ENCOUNTER — ANESTHESIA EVENT (OUTPATIENT)
Dept: OPERATING ROOM | Age: 75
DRG: 004 | End: 2020-04-01
Payer: MEDICARE

## 2020-04-01 LAB
ABSOLUTE EOS #: 0 K/UL (ref 0–0.4)
ABSOLUTE IMMATURE GRANULOCYTE: 0.58 K/UL (ref 0–0.3)
ABSOLUTE LYMPH #: 1.04 K/UL (ref 1–4.8)
ABSOLUTE MONO #: 0.93 K/UL (ref 0.1–0.8)
ALLEN TEST: ABNORMAL
ANION GAP SERPL CALCULATED.3IONS-SCNC: 10 MMOL/L (ref 9–17)
BASOPHILS # BLD: 1 % (ref 0–2)
BASOPHILS ABSOLUTE: 0.12 K/UL (ref 0–0.2)
BUN BLDV-MCNC: 26 MG/DL (ref 8–23)
BUN/CREAT BLD: ABNORMAL (ref 9–20)
CALCIUM SERPL-MCNC: 9.1 MG/DL (ref 8.6–10.4)
CHLORIDE BLD-SCNC: 104 MMOL/L (ref 98–107)
CO2: 27 MMOL/L (ref 20–31)
CREAT SERPL-MCNC: 0.5 MG/DL (ref 0.5–0.9)
CULTURE: NORMAL
CULTURE: NORMAL
DIFFERENTIAL TYPE: ABNORMAL
EOSINOPHILS RELATIVE PERCENT: 0 % (ref 1–4)
FIO2: 40
GFR AFRICAN AMERICAN: >60 ML/MIN
GFR NON-AFRICAN AMERICAN: >60 ML/MIN
GFR SERPL CREATININE-BSD FRML MDRD: ABNORMAL ML/MIN/{1.73_M2}
GFR SERPL CREATININE-BSD FRML MDRD: ABNORMAL ML/MIN/{1.73_M2}
GLUCOSE BLD-MCNC: 116 MG/DL (ref 65–105)
GLUCOSE BLD-MCNC: 119 MG/DL (ref 70–99)
GLUCOSE BLD-MCNC: 120 MG/DL (ref 65–105)
GLUCOSE BLD-MCNC: 153 MG/DL (ref 65–105)
HCT VFR BLD CALC: 23.8 % (ref 36.3–47.1)
HCT VFR BLD CALC: 28.2 % (ref 36.3–47.1)
HEMOGLOBIN: 7 G/DL (ref 11.9–15.1)
HEMOGLOBIN: 8.6 G/DL (ref 11.9–15.1)
IMMATURE GRANULOCYTES: 5 %
LYMPHOCYTES # BLD: 9 % (ref 24–44)
Lab: NORMAL
Lab: NORMAL
MAGNESIUM: 2.1 MG/DL (ref 1.6–2.6)
MCH RBC QN AUTO: 28.9 PG (ref 25.2–33.5)
MCHC RBC AUTO-ENTMCNC: 29.4 G/DL (ref 28.4–34.8)
MCV RBC AUTO: 98.3 FL (ref 82.6–102.9)
MODE: ABNORMAL
MONOCYTES # BLD: 8 % (ref 1–7)
MORPHOLOGY: ABNORMAL
MORPHOLOGY: ABNORMAL
NEGATIVE BASE EXCESS, ART: ABNORMAL (ref 0–2)
NRBC AUTOMATED: 0.4 PER 100 WBC
O2 DEVICE/FLOW/%: ABNORMAL
PATIENT TEMP: ABNORMAL
PDW BLD-RTO: 16.6 % (ref 11.8–14.4)
PHOSPHORUS: 3.4 MG/DL (ref 2.6–4.5)
PLATELET # BLD: 418 K/UL (ref 138–453)
PLATELET ESTIMATE: ABNORMAL
PMV BLD AUTO: 10.8 FL (ref 8.1–13.5)
POC HCO3: 31.9 MMOL/L (ref 21–28)
POC O2 SATURATION: 98 % (ref 94–98)
POC PCO2 TEMP: ABNORMAL MM HG
POC PCO2: 45 MM HG (ref 35–48)
POC PH TEMP: ABNORMAL
POC PH: 7.46 (ref 7.35–7.45)
POC PO2 TEMP: ABNORMAL MM HG
POC PO2: 94.4 MM HG (ref 83–108)
POSITIVE BASE EXCESS, ART: 7 (ref 0–3)
POTASSIUM SERPL-SCNC: 4.4 MMOL/L (ref 3.7–5.3)
RBC # BLD: 2.42 M/UL (ref 3.95–5.11)
RBC # BLD: ABNORMAL 10*6/UL
SAMPLE SITE: ABNORMAL
SEG NEUTROPHILS: 77 % (ref 36–66)
SEGMENTED NEUTROPHILS ABSOLUTE COUNT: 8.93 K/UL (ref 1.8–7.7)
SODIUM BLD-SCNC: 141 MMOL/L (ref 135–144)
SPECIMEN DESCRIPTION: NORMAL
SPECIMEN DESCRIPTION: NORMAL
TCO2 (CALC), ART: 33 MMOL/L (ref 22–29)
WBC # BLD: 11.6 K/UL (ref 3.5–11.3)
WBC # BLD: ABNORMAL 10*3/UL

## 2020-04-01 PROCEDURE — 6370000000 HC RX 637 (ALT 250 FOR IP): Performed by: STUDENT IN AN ORGANIZED HEALTH CARE EDUCATION/TRAINING PROGRAM

## 2020-04-01 PROCEDURE — 2000000000 HC ICU R&B

## 2020-04-01 PROCEDURE — 86850 RBC ANTIBODY SCREEN: CPT

## 2020-04-01 PROCEDURE — 36430 TRANSFUSION BLD/BLD COMPNT: CPT

## 2020-04-01 PROCEDURE — 94770 HC ETCO2 MONITOR DAILY: CPT

## 2020-04-01 PROCEDURE — 6360000002 HC RX W HCPCS: Performed by: STUDENT IN AN ORGANIZED HEALTH CARE EDUCATION/TRAINING PROGRAM

## 2020-04-01 PROCEDURE — 86920 COMPATIBILITY TEST SPIN: CPT

## 2020-04-01 PROCEDURE — 80048 BASIC METABOLIC PNL TOTAL CA: CPT

## 2020-04-01 PROCEDURE — 82803 BLOOD GASES ANY COMBINATION: CPT

## 2020-04-01 PROCEDURE — 99291 CRITICAL CARE FIRST HOUR: CPT | Performed by: INTERNAL MEDICINE

## 2020-04-01 PROCEDURE — 99233 SBSQ HOSP IP/OBS HIGH 50: CPT | Performed by: INTERNAL MEDICINE

## 2020-04-01 PROCEDURE — 97110 THERAPEUTIC EXERCISES: CPT

## 2020-04-01 PROCEDURE — 94003 VENT MGMT INPAT SUBQ DAY: CPT

## 2020-04-01 PROCEDURE — 93005 ELECTROCARDIOGRAM TRACING: CPT | Performed by: STUDENT IN AN ORGANIZED HEALTH CARE EDUCATION/TRAINING PROGRAM

## 2020-04-01 PROCEDURE — 2580000003 HC RX 258: Performed by: STUDENT IN AN ORGANIZED HEALTH CARE EDUCATION/TRAINING PROGRAM

## 2020-04-01 PROCEDURE — 94761 N-INVAS EAR/PLS OXIMETRY MLT: CPT

## 2020-04-01 PROCEDURE — 84100 ASSAY OF PHOSPHORUS: CPT

## 2020-04-01 PROCEDURE — 94640 AIRWAY INHALATION TREATMENT: CPT

## 2020-04-01 PROCEDURE — 99213 OFFICE O/P EST LOW 20 MIN: CPT

## 2020-04-01 PROCEDURE — 85014 HEMATOCRIT: CPT

## 2020-04-01 PROCEDURE — C9113 INJ PANTOPRAZOLE SODIUM, VIA: HCPCS | Performed by: STUDENT IN AN ORGANIZED HEALTH CARE EDUCATION/TRAINING PROGRAM

## 2020-04-01 PROCEDURE — 2700000000 HC OXYGEN THERAPY PER DAY

## 2020-04-01 PROCEDURE — 86900 BLOOD TYPING SEROLOGIC ABO: CPT

## 2020-04-01 PROCEDURE — 85025 COMPLETE CBC W/AUTO DIFF WBC: CPT

## 2020-04-01 PROCEDURE — P9016 RBC LEUKOCYTES REDUCED: HCPCS

## 2020-04-01 PROCEDURE — 6360000002 HC RX W HCPCS: Performed by: INTERNAL MEDICINE

## 2020-04-01 PROCEDURE — 2500000003 HC RX 250 WO HCPCS: Performed by: STUDENT IN AN ORGANIZED HEALTH CARE EDUCATION/TRAINING PROGRAM

## 2020-04-01 PROCEDURE — 86901 BLOOD TYPING SEROLOGIC RH(D): CPT

## 2020-04-01 PROCEDURE — 83735 ASSAY OF MAGNESIUM: CPT

## 2020-04-01 PROCEDURE — 85018 HEMOGLOBIN: CPT

## 2020-04-01 PROCEDURE — 82947 ASSAY GLUCOSE BLOOD QUANT: CPT

## 2020-04-01 PROCEDURE — 37799 UNLISTED PX VASCULAR SURGERY: CPT

## 2020-04-01 RX ORDER — 0.9 % SODIUM CHLORIDE 0.9 %
20 INTRAVENOUS SOLUTION INTRAVENOUS ONCE
Status: COMPLETED | OUTPATIENT
Start: 2020-04-01 | End: 2020-04-01

## 2020-04-01 RX ORDER — QUETIAPINE FUMARATE 100 MG/1
100 TABLET, FILM COATED ORAL NIGHTLY
Status: DISCONTINUED | OUTPATIENT
Start: 2020-04-01 | End: 2020-04-06 | Stop reason: HOSPADM

## 2020-04-01 RX ORDER — QUETIAPINE FUMARATE 25 MG/1
50 TABLET, FILM COATED ORAL NIGHTLY
Status: DISCONTINUED | OUTPATIENT
Start: 2020-04-01 | End: 2020-04-01

## 2020-04-01 RX ADMIN — HEPARIN SODIUM 5000 UNITS: 5000 INJECTION INTRAVENOUS; SUBCUTANEOUS at 22:54

## 2020-04-01 RX ADMIN — SUCRALFATE 1 G: 1 TABLET ORAL at 00:41

## 2020-04-01 RX ADMIN — ALBUTEROL SULFATE 2.5 MG: 2.5 SOLUTION RESPIRATORY (INHALATION) at 23:16

## 2020-04-01 RX ADMIN — PROPOFOL 50 MCG/KG/MIN: 10 INJECTION, EMULSION INTRAVENOUS at 02:43

## 2020-04-01 RX ADMIN — CLONAZEPAM 0.5 MG: 0.5 TABLET ORAL at 22:54

## 2020-04-01 RX ADMIN — POLYETHYLENE GLYCOL 3350 17 G: 17 POWDER, FOR SOLUTION ORAL at 10:24

## 2020-04-01 RX ADMIN — INSULIN LISPRO 1 UNITS: 100 INJECTION, SOLUTION INTRAVENOUS; SUBCUTANEOUS at 13:19

## 2020-04-01 RX ADMIN — SODIUM CHLORIDE, PRESERVATIVE FREE 10 ML: 5 INJECTION INTRAVENOUS at 22:56

## 2020-04-01 RX ADMIN — PROPOFOL 50 MCG/KG/MIN: 10 INJECTION, EMULSION INTRAVENOUS at 10:30

## 2020-04-01 RX ADMIN — AMITRIPTYLINE HYDROCHLORIDE 25 MG: 25 TABLET, FILM COATED ORAL at 15:00

## 2020-04-01 RX ADMIN — MIDODRINE HYDROCHLORIDE 5 MG: 5 TABLET ORAL at 17:29

## 2020-04-01 RX ADMIN — FENTANYL CITRATE 100 MCG: 50 INJECTION, SOLUTION INTRAMUSCULAR; INTRAVENOUS at 11:02

## 2020-04-01 RX ADMIN — Medication 10 MG/HR: at 13:44

## 2020-04-01 RX ADMIN — DOCUSATE SODIUM 100 MG: 50 LIQUID ORAL at 08:30

## 2020-04-01 RX ADMIN — QUETIAPINE FUMARATE 100 MG: 100 TABLET ORAL at 22:54

## 2020-04-01 RX ADMIN — CLONAZEPAM 0.5 MG: 0.5 TABLET ORAL at 10:20

## 2020-04-01 RX ADMIN — Medication 10 MG/HR: at 23:10

## 2020-04-01 RX ADMIN — AMITRIPTYLINE HYDROCHLORIDE 25 MG: 25 TABLET, FILM COATED ORAL at 08:30

## 2020-04-01 RX ADMIN — MIDODRINE HYDROCHLORIDE 5 MG: 5 TABLET ORAL at 08:30

## 2020-04-01 RX ADMIN — IPRATROPIUM BROMIDE AND ALBUTEROL SULFATE 1 AMPULE: .5; 3 SOLUTION RESPIRATORY (INHALATION) at 09:02

## 2020-04-01 RX ADMIN — SODIUM CHLORIDE, PRESERVATIVE FREE 10 ML: 5 INJECTION INTRAVENOUS at 09:30

## 2020-04-01 RX ADMIN — BISACODYL 10 MG: 10 SUPPOSITORY RECTAL at 10:20

## 2020-04-01 RX ADMIN — PROPOFOL 50 MCG/KG/MIN: 10 INJECTION, EMULSION INTRAVENOUS at 13:45

## 2020-04-01 RX ADMIN — IPRATROPIUM BROMIDE AND ALBUTEROL SULFATE 1 AMPULE: .5; 3 SOLUTION RESPIRATORY (INHALATION) at 16:47

## 2020-04-01 RX ADMIN — METOCLOPRAMIDE 10 MG: 5 INJECTION, SOLUTION INTRAMUSCULAR; INTRAVENOUS at 13:08

## 2020-04-01 RX ADMIN — PROPOFOL 50 MCG/KG/MIN: 10 INJECTION, EMULSION INTRAVENOUS at 17:33

## 2020-04-01 RX ADMIN — ANTI-FUNGAL POWDER MICONAZOLE NITRATE TALC FREE: 1.42 POWDER TOPICAL at 22:56

## 2020-04-01 RX ADMIN — SODIUM CHLORIDE, PRESERVATIVE FREE 10 ML: 5 INJECTION INTRAVENOUS at 10:23

## 2020-04-01 RX ADMIN — GABAPENTIN 300 MG: 250 SOLUTION ORAL at 06:37

## 2020-04-01 RX ADMIN — MINERAL OIL 45 ML: 1000 SOLUTION ORAL at 10:25

## 2020-04-01 RX ADMIN — GABAPENTIN 300 MG: 250 SOLUTION ORAL at 22:53

## 2020-04-01 RX ADMIN — BUSPIRONE HYDROCHLORIDE 20 MG: 10 TABLET ORAL at 15:30

## 2020-04-01 RX ADMIN — DOCUSATE SODIUM 100 MG: 50 LIQUID ORAL at 22:53

## 2020-04-01 RX ADMIN — METOCLOPRAMIDE 10 MG: 5 INJECTION, SOLUTION INTRAMUSCULAR; INTRAVENOUS at 01:46

## 2020-04-01 RX ADMIN — SUCRALFATE 1 G: 1 TABLET ORAL at 10:23

## 2020-04-01 RX ADMIN — IPRATROPIUM BROMIDE AND ALBUTEROL SULFATE 1 AMPULE: .5; 3 SOLUTION RESPIRATORY (INHALATION) at 12:35

## 2020-04-01 RX ADMIN — HEPARIN SODIUM 5000 UNITS: 5000 INJECTION INTRAVENOUS; SUBCUTANEOUS at 06:36

## 2020-04-01 RX ADMIN — SUCRALFATE 1 G: 1 TABLET ORAL at 13:08

## 2020-04-01 RX ADMIN — METOCLOPRAMIDE 10 MG: 5 INJECTION, SOLUTION INTRAMUSCULAR; INTRAVENOUS at 06:37

## 2020-04-01 RX ADMIN — HEPARIN SODIUM 5000 UNITS: 5000 INJECTION INTRAVENOUS; SUBCUTANEOUS at 14:54

## 2020-04-01 RX ADMIN — PROPOFOL 50 MCG/KG/MIN: 10 INJECTION, EMULSION INTRAVENOUS at 23:09

## 2020-04-01 RX ADMIN — SUCRALFATE 1 G: 1 TABLET ORAL at 22:57

## 2020-04-01 RX ADMIN — FUROSEMIDE 20 MG: 10 INJECTION, SOLUTION INTRAMUSCULAR; INTRAVENOUS at 10:19

## 2020-04-01 RX ADMIN — POTASSIUM CHLORIDE: 2 INJECTION, SOLUTION, CONCENTRATE INTRAVENOUS at 17:48

## 2020-04-01 RX ADMIN — GABAPENTIN 300 MG: 250 SOLUTION ORAL at 15:04

## 2020-04-01 RX ADMIN — Medication 10 ML: at 10:23

## 2020-04-01 RX ADMIN — TRAZODONE HYDROCHLORIDE 100 MG: 100 TABLET ORAL at 22:54

## 2020-04-01 RX ADMIN — IPRATROPIUM BROMIDE AND ALBUTEROL SULFATE 1 AMPULE: .5; 3 SOLUTION RESPIRATORY (INHALATION) at 19:27

## 2020-04-01 RX ADMIN — Medication 10 MG/HR: at 02:30

## 2020-04-01 RX ADMIN — PROPOFOL 50 MCG/KG/MIN: 10 INJECTION, EMULSION INTRAVENOUS at 06:48

## 2020-04-01 RX ADMIN — METOCLOPRAMIDE 10 MG: 5 INJECTION, SOLUTION INTRAMUSCULAR; INTRAVENOUS at 17:30

## 2020-04-01 RX ADMIN — PANTOPRAZOLE SODIUM 40 MG: 40 INJECTION, POWDER, FOR SOLUTION INTRAVENOUS at 10:00

## 2020-04-01 RX ADMIN — PANTOPRAZOLE SODIUM 40 MG: 40 INJECTION, POWDER, FOR SOLUTION INTRAVENOUS at 22:54

## 2020-04-01 RX ADMIN — Medication 10 ML: at 22:55

## 2020-04-01 RX ADMIN — TRAZODONE HYDROCHLORIDE 100 MG: 100 TABLET ORAL at 00:41

## 2020-04-01 RX ADMIN — METOCLOPRAMIDE 10 MG: 5 INJECTION, SOLUTION INTRAMUSCULAR; INTRAVENOUS at 22:56

## 2020-04-01 RX ADMIN — AMITRIPTYLINE HYDROCHLORIDE 25 MG: 25 TABLET, FILM COATED ORAL at 22:54

## 2020-04-01 RX ADMIN — ANTI-FUNGAL POWDER MICONAZOLE NITRATE TALC FREE: 1.42 POWDER TOPICAL at 10:24

## 2020-04-01 RX ADMIN — MIDODRINE HYDROCHLORIDE 5 MG: 5 TABLET ORAL at 13:08

## 2020-04-01 RX ADMIN — ALBUTEROL SULFATE 2.5 MG: 2.5 SOLUTION RESPIRATORY (INHALATION) at 03:53

## 2020-04-01 RX ADMIN — SUCRALFATE 1 G: 1 TABLET ORAL at 17:30

## 2020-04-01 RX ADMIN — SODIUM CHLORIDE 20 ML: 0.9 INJECTION, SOLUTION INTRAVENOUS at 12:30

## 2020-04-01 RX ADMIN — FENTANYL CITRATE 100 MCG: 50 INJECTION, SOLUTION INTRAMUSCULAR; INTRAVENOUS at 06:37

## 2020-04-01 RX ADMIN — SODIUM CHLORIDE, PRESERVATIVE FREE 10 ML: 5 INJECTION INTRAVENOUS at 22:55

## 2020-04-01 RX ADMIN — BUSPIRONE HYDROCHLORIDE 20 MG: 10 TABLET ORAL at 22:54

## 2020-04-01 RX ADMIN — Medication 10 ML: at 10:24

## 2020-04-01 RX ADMIN — BUSPIRONE HYDROCHLORIDE 20 MG: 10 TABLET ORAL at 10:21

## 2020-04-01 RX ADMIN — ESCITALOPRAM OXALATE 20 MG: 10 TABLET ORAL at 10:21

## 2020-04-01 ASSESSMENT — PULMONARY FUNCTION TESTS
PIF_VALUE: 20
PIF_VALUE: 16
PIF_VALUE: 20
PIF_VALUE: 18
PIF_VALUE: 20

## 2020-04-01 NOTE — ANESTHESIA PRE PROCEDURE
for Heartburn    Historical Provider, MD   metoprolol succinate (TOPROL XL) 25 MG extended release tablet take 1 tablet by mouth once daily 9/30/17   Historical Provider, MD   gabapentin (NEURONTIN) 300 MG capsule Take 300 mg by mouth 3 times daily. Historical Provider, MD   simvastatin (ZOCOR) 40 MG tablet Take 40 mg by mouth nightly. Historical Provider, MD       Current medications:    No current facility-administered medications for this visit. No current outpatient medications on file.      Facility-Administered Medications Ordered in Other Visits   Medication Dose Route Frequency Provider Last Rate Last Dose    0.9 % sodium chloride bolus  20 mL Intravenous Once Haseeb Kendrick MD 10 mL/hr at 04/01/20 1230 20 mL at 04/01/20 1230    QUEtiapine (SEROQUEL) tablet 50 mg  50 mg Oral Nightly Amber Tiwari MD        PN-Adult 2-in-1 Central Line (Standard)   Intravenous Continuous TPN Kirk Chan MD 65 mL/hr at 03/31/20 1801      miconazole (MICOTIN) 2 % powder   Topical BID Haseeb Kendrick MD        propofol injection  10 mcg/kg/min Intravenous Titrated Torrey Block MD 26.6 mL/hr at 04/01/20 1030 50 mcg/kg/min at 04/01/20 1030    amitriptyline (ELAVIL) tablet 25 mg  25 mg Oral TID German Runner, MD   25 mg at 04/01/20 0830    fentaNYL (SUBLIMAZE) injection 100 mcg  100 mcg Intravenous Q2H PRN Boom Lazo MD   100 mcg at 04/01/20 1102    Or    fentaNYL (SUBLIMAZE) injection 75 mcg  75 mcg Intravenous Q2H PRN Boom Lazo MD        docusate (COLACE) 50 MG/5ML liquid 100 mg  100 mg Oral BID Darien Ramonita, DO   100 mg at 04/01/20 0830    albuterol (PROVENTIL) nebulizer solution 2.5 mg  2.5 mg Nebulization BID Darien Ramonita, DO   2.5 mg at 04/01/20 0353    mineral oil liquid 45 mL  45 mL Per G Tube Daily Darienhedy Bellos, DO   45 mL at 04/01/20 1025    polyethylene glycol (GLYCOLAX) packet 17 g  17 g Per G Tube Daily Darienhedy Bellos, DO   17 g at 04/01/20 1024    midodrine (PROAMATINE) tablet 5 mg  5 mg Oral TID WC [facility]   5 mg at 04/01/20 1308    heparin (porcine) injection 5,000 Units  5,000 Units Subcutaneous 3 times per day [facility] Massed, DO   5,000 Units at 04/01/20 0636    furosemide (LASIX) injection 20 mg  20 mg Intravenous Daily [facility] Massed, DO   20 mg at 04/01/20 1019    ipratropium-albuterol (DUONEB) nebulizer solution 1 ampule  1 ampule Inhalation 4x daily [facility] Massed, DO   1 ampule at 04/01/20 1235    labetalol (NORMODYNE;TRANDATE) injection 10 mg  10 mg Intravenous Once [facility], DO        gabapentin (NEURONTIN) 250 MG/5ML solution 300 mg  300 mg Oral 3 times per day [facility], DO   300 mg at 04/01/20 2625    lubrifresh P.M. (artificial tears) ophthalmic ointment   Both Eyes PRN [facility], DO        acetaminophen (TYLENOL) tablet 650 mg  650 mg Oral Q4H PRN [facility], DO   650 mg at 03/31/20 2058    Or    acetaminophen (TYLENOL) suppository 650 mg  650 mg Rectal Q6H PRN [facility], DO   650 mg at 03/29/20 0338    albuterol (PROVENTIL) nebulizer solution 2.5 mg  2.5 mg Nebulization Q6H PRN [facility], DO        glucose (GLUTOSE) 40 % oral gel 15 g  15 g Oral PRN [facility], DO        dextrose 50 % IV solution  12.5 g Intravenous PRN [facility], DO        glucagon (rDNA) injection 1 mg  1 mg Intramuscular PRN [facility], DO        dextrose 5 % solution  100 mL/hr Intravenous PRN [facility], DO        insulin lispro (HUMALOG) injection vial 0-6 Units  0-6 Units Subcutaneous Q6H [facility] Massed, DO   1 Units at 04/01/20 1319    midazolam (VERSED) injection 2 mg  2 mg Intravenous Once [facility] Massed, DO        sucralfate (CARAFATE) tablet 1 g  1 g Oral 4 times per day [facility] Massed, DO   1 g at 04/01/20 1308    0.9 % sodium chloride infusion   Intravenous Continuous [facility] Massed, DO 10 mL/hr at 03/27/20 0900      norepinephrine (LEVOPHED) 16 mg in dextrose 5% 250 mL infusion  2 mcg/min Intravenous Continuous Gaylin Massed, DO 5.6 mL/hr at 04/01/20 0250 6 mcg/min at emphysema ([facility]) J43.2    Atelectasis J98.11    Pleural effusion J90    Esophageal dilatation K22.8    Upper GI bleeding K92.2    Acute on chronic blood loss anemia D62    Shock ([facility]) R57.9    Fever R50.9    Acute postoperative respiratory insufficiency J95.89    Critical illness polyneuropathy ([facility]) G62.81       Past Medical History:        Diagnosis Date    Anxiety     Arthritis     Back pain     Bronchitis     Caffeine use     8 coffee / day    Carpal tunnel syndrome     Cataracts, bilateral     Constipation     Depression     Diarrhea     GERD (gastroesophageal reflux disease)     Hyperlipidemia     Hypertension     Mumps     MVP (mitral valve prolapse)     Dr. Venkat John in May 2019    Recurrent UTI     Dr. Joe Owen    Sinusitis     Ulcerative esophagitis     Wellness examination     Dr. Janet Sanchez seen in Jan 2020       Past Surgical History:        Procedure Laterality Date    APPENDECTOMY      CARPAL TUNNEL RELEASE      COLONOSCOPY      CYSTOSCOPY      EGD  3/17/2020         EYE SURGERY      patricia IOL    GASTRIC FUNDOPLICATION N/A 5/13/8261    XI LAPAROSCOPIC ROBOTIC HIATAL HERNIA REPAIR, CHERYL FUNDOPLICATION performed by Tyson Ramirez MD at [facility] N/A 3/27/2020    EGD PEG TUBE PLACEMENT performed by Tyson Ramirez MD at [address] [place] [address] CATH POWER PICC TRIPLE  3/4/2020         HIATAL HERNIA REPAIR  02/24/2020    LAPAROSCOPIC ROBOTIC HIATAL HERNIA REPAIR, CHERYL FUNDOPLICATION     HYSTERECTOMY      Abdominal    JOINT REPLACEMENT Right     right hip    OVARY REMOVAL      RHINOPLASTY      TONSILLECTOMY      TOTAL HIP ARTHROPLASTY      TOTAL NEPHRECTOMY      atrophic from infections, age 13   Tianna Butt TRACHEOSTOMY N/A 3/27/2020    **ADD ON **WANTS 10:00AM **TRACHEOTOMY performed by Tyson Ramirez MD at [address] N/A 3/17/2020    BEDSIDE EGD ESOPHAGOGASTRODUODENOSCOPY (ICU) performed by Mackenzie Taylor MD at [facility]       Social History:    Social History     Tobacco Use    Smoking status: Current Every Day Smoker     Packs/day: 1.00     Years: 50.00     Pack years: 50.00     Types: Cigarettes    Smokeless tobacco: Never Used   Substance Use Topics    Alcohol use: No                                Ready to quit: Not Answered  Counseling given: Not Answered      Vital Signs (Current): There were no vitals filed for this visit.                                            BP Readings from Last 3 Encounters:   04/01/20 (!) 91/46   02/29/20 125/82   02/28/20 139/63       NPO Status:                                                                                 BMI:   Wt Readings from Last 3 Encounters:   04/01/20 191 lb 2.2 oz (86.7 kg)   02/29/20 186 lb 1.1 oz (84.4 kg)   02/24/20 183 lb 6.4 oz (83.2 kg)     There is no height or weight on file to calculate BMI.    CBC:   Lab Results   Component Value Date    WBC 11.6 04/01/2020    RBC 2.42 04/01/2020    HGB 7.0 04/01/2020    HCT 23.8 04/01/2020    MCV 98.3 04/01/2020    RDW 16.6 04/01/2020     04/01/2020       CMP:   Lab Results   Component Value Date     04/01/2020    K 4.4 04/01/2020     04/01/2020    CO2 27 04/01/2020    BUN 26 04/01/2020    CREATININE 0.50 04/01/2020    GFRAA >60 04/01/2020    LABGLOM >60 04/01/2020    GLUCOSE 119 04/01/2020    PROT 6.2 03/31/2020    CALCIUM 9.1 04/01/2020    BILITOT 0.29 03/26/2020    ALKPHOS 133 03/26/2020    AST 34 03/26/2020    ALT 25 03/26/2020       POC Tests:   Recent Labs     04/01/20  1306   POCGLU 153*       Coags:   Lab Results   Component Value Date    PROTIME 10.0 03/27/2020    INR 1.0 03/27/2020    APTT 26.2 03/27/2020       HCG (If Applicable): No results found for: PREGTESTUR, PREGSERUM, HCG, HCGQUANT     ABGs: No results found for: PHART, PO2ART, EOO1EWR, BTC9YXS, BEART, A4PNFSYW     Type & Screen (If Applicable):  No results found for: LABABO,

## 2020-04-01 NOTE — PROGRESS NOTES
OVARY REMOVAL      RHINOPLASTY      TONSILLECTOMY      TOTAL HIP ARTHROPLASTY      TOTAL NEPHRECTOMY      atrophic from infections, age 13   [facility] TRACHEOSTOMY N/A 3/27/2020    **ADD ON **WANTS 10:00AM **TRACHEOTOMY performed by Lizeth Reis MD at [address] N/A 3/17/2020    BEDSIDE EGD ESOPHAGOGASTRODUODENOSCOPY (ICU) performed by Lizeth Reis MD at [facility]       Medications:      miconazole   Topical BID    amitriptyline  25 mg Oral TID    docusate  100 mg Oral BID    albuterol  2.5 mg Nebulization BID    mineral oil  45 mL Per G Tube Daily    polyethylene glycol  17 g Per G Tube Daily    midodrine  5 mg Oral TID WC    heparin (porcine)  5,000 Units Subcutaneous 3 times per day    furosemide  20 mg Intravenous Daily    ipratropium-albuterol  1 ampule Inhalation 4x daily    labetalol  10 mg Intravenous Once    gabapentin  300 mg Oral 3 times per day    insulin lispro  0-6 Units Subcutaneous Q6H    midazolam  2 mg Intravenous Once    sucralfate  1 g Oral 4 times per day    pantoprazole  40 mg Intravenous BID    And    sodium chloride (PF)  10 mL Intravenous BID    metoclopramide  10 mg Intravenous Q6H    bisacodyl  10 mg Rectal Daily    lidocaine 1 % injection  5 mL Intradermal Once    sodium chloride flush  10 mL Intravenous 2 times per day    traZODone  100 mg Oral Nightly    magnesium citrate  296 mL Oral Once    clonazePAM  0.5 mg Oral BID    [Held by provider] amLODIPine  10 mg Oral Nightly    sodium chloride flush  10 mL Intravenous 2 times per day    busPIRone  20 mg Oral TID    [Held by provider] metoprolol succinate  25 mg Oral Nightly    nicotine  1 patch Transdermal Daily    sodium chloride (PF)  10 mL Intravenous Daily    escitalopram  20 mg Oral Daily       Social History:     Social History     Socioeconomic History    Marital status:       Spouse name: Not on file    Number of children: 2 inflammation. ENT: No sore throat or runny nose. . No hearing loss, tinnitus or vertigo. Cardiovascular: No chest pain or palpitations. No shortness of breath. No HOLLAND  Lung: No shortness of breath or cough. No sputum production  Abdomen: No nausea, vomiting, diarrhea, or abdominal pain. [place] Semen No cramps. Genitourinary: No increased urinary frequency, or dysuria. No hematuria. No suprapubic or CVA pain  Musculoskeletal: No muscle aches or pains. No joint effusions, swelling or deformities  Hematologic: No bleeding or bruising. Neurologic: No headache, weakness, numbness, or tingling. Integument: No rash, no ulcers. Psychiatric: No depression. Endocrine: No polyuria, no polydipsia, no polyphagia. Physical Examination :     Patient Vitals for the past 8 hrs:   Pulse Resp SpO2 Weight   04/01/20 0901 -- 26 -- --   04/01/20 0654 -- -- -- 191 lb 2.2 oz (86.7 kg)   04/01/20 0600 83 27 99 % --   04/01/20 0545 85 (!) 31 99 % --   04/01/20 0530 85 26 99 % --   04/01/20 0515 87 26 99 % --   04/01/20 0500 91 29 98 % --   04/01/20 0445 90 25 98 % --   04/01/20 0430 95 (!) 34 97 % --   04/01/20 0415 93 (!) 33 97 % --   04/01/20 0400 86 25 99 % --   04/01/20 0357 85 27 99 % --   04/01/20 0345 82 26 99 % --   04/01/20 0330 83 25 100 % --   04/01/20 0315 85 26 99 % --   04/01/20 0300 86 27 99 % --     General Appearance: Sedated, trached,   Head:  Normocephalic, no trauma  Eyes: Pupils equal, round, reactive to light   Neck:Supple, without lymphadenopathy. Thyroid normal, No bruits. Trach site with small amount of blood  Pulmonary/Chest:  Coarse breath sounds/ crackles appreciated diffusely   Cardiovascular: Regular tachycardic, rate and rhythm without murmurs, rubs, or gallops. Abdomen: Soft, non tender. Bowel sounds very quiet. No organomegaly. Gtube in place, site is clean  All four Extremities:Generalized edema, bilateral LE 2+ edema  Neurologic:  sedated  Skin: Warm and dry with good turgor. No signs of peripheral arterial

## 2020-04-01 NOTE — PROGRESS NOTES
04/01/20 0530 85 26 99 % --   04/01/20 0515 87 26 99 % --   04/01/20 0500 91 29 98 % --   04/01/20 0445 90 25 98 % --   04/01/20 0430 95 (!) 34 97 % --   04/01/20 0415 93 (!) 33 97 % --   04/01/20 0400 86 25 99 % --   04/01/20 0357 85 27 99 % --   04/01/20 0345 82 26 99 % --   04/01/20 0330 83 25 100 % --         Intake/Output Summary (Last 24 hours) at 4/1/2020 1120  Last data filed at 4/1/2020 0648  Gross per 24 hour   Intake 2387.61 ml   Output 3180 ml   Net -792.39 ml     Date 04/01/20 0000 - 04/01/20 2359   Shift 9514-6942 5156-4765 5226-4159 24 Hour Total   INTAKE   I.V.(mL/kg) 514(5.9)   514(5.9)   TPN(mL/kg) 589(6.8)   589(6.8)   Shift Total(mL/kg) 1103(12.7)   1103(12.7)   OUTPUT   Urine(mL/kg/hr) 990(1.4)   990   Emesis/NG output(mL/kg) 400(4.6)   400(4.6)   Shift Total(mL/kg) 1390(16)   1390(16)   Weight (kg) 86.7 86.7 86.7 86.7     Wt Readings from Last 3 Encounters:   04/01/20 191 lb 2.2 oz (86.7 kg)   02/29/20 186 lb 1.1 oz (84.4 kg)   02/24/20 183 lb 6.4 oz (83.2 kg)     Body mass index is 34.96 kg/m². PHYSICAL EXAM:  Constitutional: Intubated, sedated on Versed and Precedex, not following any commands  Neck: trach intact, clean and dry  Respiratory: clear to auscultation, no wheezes or rales and unlabored breathing. Cardiovascular: regular rate and rhythm, normal S1, S2, no murmur noted and 2+ pulses throughout  Abdomen: softly distended, no masses or organomegaly. PEG tube in the epigastrium covered with abdominal binder.   NEUROLOGIC Intubated, sedated and was not following any commands   extremities:  peripheral pulses normal,1+ pedal edema, no clubbing or cyanosis  SKIN: normal coloration and turgor     Any additional physical findings:  MEDICATIONS:  Scheduled Meds:   sodium chloride  20 mL Intravenous Once    QUEtiapine  50 mg Oral Nightly    miconazole   Topical BID    amitriptyline  25 mg Oral TID    docusate  100 mg Oral BID    albuterol  2.5 mg Nebulization BID    mineral

## 2020-04-01 NOTE — PROGRESS NOTES
Nutrition Assessment (Parenteral Nutrition)    Type and Reason for Visit: Reassess    Nutrition Recommendations: Continue current TPN at this time. Will continue to monitor labs, weight, medications, and care plans. Modify PN as needed. Nutrition Assessment: Tube Feeding remains on hold at this time. TPN continues. Malnutrition Assessment:  · Malnutrition Status: At risk for malnutrition  · Context: Acute illness or injury  · Findings of the 6 clinical characteristics of malnutrition (Minimum of 2 out of 6 clinical characteristics is required to make the diagnosis of moderate or severe Protein Calorie Malnutrition based on AND/ASPEN Guidelines):  1. Energy Intake-Greater than 75% of estimated energy requirement, Greater than or equal to 5 days(with TPN)    2. Weight Loss-No significant weight loss,  3. Fat Loss-Mild subcutaneous fat loss, Orbital  4. Muscle Loss-No significant muscle mass loss,    5. Fluid Accumulation-Moderate to severe fluid accumulation, Extremities, Generalized  6.   Strength-Not measured    Nutrition Risk Level: High    Nutrient Needs:  · Estimated Daily Total Kcal: 5010-8019 kcal/day  · Estimated Daily Protein (g):  g pro/day     Nutrition Diagnosis:   · Problem: Inadequate oral intake  · Etiology: related to Impaired respiratory function-inability to consume food, Alteration in GI function     Signs and symptoms:  as evidenced by NPO status due to medical condition, Nutrition support - PN    Objective Information:  · Wound Type: Surgical Wound  · Current Nutrition Therapies:  · Oral Diet Orders: NPO   · Parenteral Nutrition Orders:  · Type and Formula:Custom Central :250 g Dex, 90 g AA  · Lipids: None  · Rate/Volume: 65 mL/hr  · Duration: Continuous  · Current PN Order Provides: 1210 kcal and 90 g pro/day   · Additional Calories: Propofol currently at 26.6 ml/jp=788 kcal/day  · Anthropometric Measures:  · Ht: 5' 2\" (157.5 cm)   · Current Body Wt: 191 lb 2.2 oz (86.7

## 2020-04-01 NOTE — PROGRESS NOTES
Physical Therapy  DATE: 2020  NAME: Lindsey Mosher  MRN: [id number]   : 1945    Discharge Recommendations: Continue to Assess (pending progress)     Subjective: RN agreeable to ROM. Pain: LIZZY, no facial grimacing noted  Patient follows: No Commands  Is patient on ventilator: Yes  Is patient on sedation: Yes  Precautions:General, L art line, Hgb 7.0     Therapeutic exercises:  UE/LE(s)  Bilateral Passive range of motion all planes x 10 reps  bilateral gastrocnemius stretching 3 reps x 30 seconds  FDS switched to RLE. All vitals remained stable throughout       Goals  Short Term Goals  Short term goal 1: Pt to ambulate 100ft supervision with RW  Short term goal 2: Pt to sit <> stand transfer supervision  Short term goal 3: Pt to tolerate 30 minutes of therapy for endurance  Short term goal 4: Pt to demonstrate supervision bed mobility  Short term goal 5: Pt to demonstrate good to good - standing balance to decrease risk of falls    Plan: Progress functional mobility as medically appropriate.    Time In: 06  Time Out: 0941  Time Coded Minutes (treatment minutes): 16  Rehab Potential: Fair  Treatments/week: 2-3x/wk    Tamika Penaloza, VENU negative

## 2020-04-01 NOTE — PROGRESS NOTES
ARTHROPLASTY      TOTAL NEPHRECTOMY      atrophic from infections, age 13   Juliana Samples TRACHEOSTOMY N/A 3/27/2020    **ADD ON **WANTS 10:00AM **TRACHEOTOMY performed by Veronika Claros MD at [address] ENDOSCOPY N/A 3/17/2020    BEDSIDE EGD ESOPHAGOGASTRODUODENOSCOPY (ICU) performed by Veronika Claros MD at [facility]       FAMILY HISTORY    Family History   Problem Relation Age of Onset    Cancer Mother         uterine    Heart Attack Mother     Heart Attack Father     Other Maternal Grandfather         foot gangrene    Other Paternal Grandmother         bleeding ulcers    Other Paternal Grandfather         lung cancer       SOCIAL HISTORY    Social History     Tobacco Use    Smoking status: Current Every Day Smoker     Packs/day: 1.00     Years: 50.00     Pack years: 50.00     Types: Cigarettes    Smokeless tobacco: Never Used   Substance Use Topics    Alcohol use: No    Drug use: No       ALLERGIES    Allergies   Allergen Reactions    Prednisone Anxiety    Compazine [Prochlorperazine Maleate]     Penicillin V Potassium     Penicillins Other (See Comments)     shock           Objective:      BP (!) 116/48   Pulse 83   Temp 100 °F (37.8 °C) (Oral)   Resp 26   Ht 5' 2\" (1.575 m)   Wt 191 lb 2.2 oz (86.7 kg)   SpO2 99%   BMI 34.96 kg/m²   Ed Risk Score: Ed Scale Score: 9    LABS    CBC:   Lab Results   Component Value Date    WBC 11.6 04/01/2020    RBC 2.42 04/01/2020    HGB 7.0 04/01/2020     CMP:  Albumin:    Lab Results   Component Value Date    LABALBU 1.5 03/26/2020     PT/INR:    Lab Results   Component Value Date    PROTIME 10.0 03/27/2020    INR 1.0 03/27/2020     HgBA1c:  No results found for: LABA1C  PTT: No components found for: LABPTT      Assessment:     Patient Active Problem List   Diagnosis    Frequency of urination    Severe sepsis (HCC)    Back pain    GERD (gastroesophageal reflux disease)    MVP (mitral valve prolapse)    Depression    Anxiety    Urine retention    Acute kidney injury ([address]    Esophageal dysphagia    Paraesophageal hernia    History of repair of hiatal hernia    Aspiration pneumonia (HCC)    Respiratory failure (HCC)    Centrilobular emphysema (HCC)    Atelectasis    Pleural effusion    Esophageal dilatation    Upper GI bleeding    Acute on chronic blood loss anemia    Shock ([address]    Fever    Acute postoperative respiratory insufficiency    Critical illness polyneuropathy ([address]       Measurements:     04/01/20 1115   Wound 04/01/20 Neck Mid;Distal non-healing area of tracheostomy incision   Date First Assessed/Time First Assessed: 04/01/20 1115   Present on Hospital Admission: No  Location: Neck  Wound Location Orientation: Mid;Distal  Wound Description (Comments): non-healing area of tracheostomy incision   Wound Non-Healing Surgical   Dressing Changed Changed/New   Dressing/Treatment Foam   Wound Cleansed Rinsed/Irrigated with saline   Dressing Change Due 04/04/20   Wound Length (cm) 1.2 cm   Wound Width (cm) 0.6 cm   Wound Depth (cm) 0.3 cm   Wound Surface Area (cm^2) 0.72 cm^2   Wound Volume (cm^3) 0.22 cm^3   Wound Assessment Dry;Red;Yellow   Drainage Amount None   Odor None   Any-wound Assessment Dry; Intact; Red     Inferior aspect of the trach plate is pushing into the distal incision when patient is positioned onto a head/ standard pillow at 30 degrees. Response to treatment:  Well tolerated by patient. Plan:     Plan of Care: Turn every 2 hours  Float heels off of bed with pillows under calves    Use lift sling to reposition patient to minimize potential for shear injury. Foam sacrum dressing to sacrococcygeal area. Peel back dressing, inspect skin beneath, re-secure. Change every 72 hours and prn wrinkles, soilage. Discontinue Sacral dressing if repeatedly soiled by incontinence.      Routine incontinence care with incontinence barrier cloths and zinc oxide

## 2020-04-01 NOTE — PROGRESS NOTES
Site thoroughly cleaned. Pressure sore below plate noted, cushioned with gauze, RN notified. Wound care consult requested.

## 2020-04-02 ENCOUNTER — APPOINTMENT (OUTPATIENT)
Dept: GENERAL RADIOLOGY | Age: 75
DRG: 004 | End: 2020-04-02
Attending: INTERNAL MEDICINE
Payer: MEDICARE

## 2020-04-02 ENCOUNTER — ANESTHESIA (OUTPATIENT)
Dept: OPERATING ROOM | Age: 75
DRG: 004 | End: 2020-04-02
Payer: MEDICARE

## 2020-04-02 VITALS — TEMPERATURE: 96.8 F | OXYGEN SATURATION: 100 % | RESPIRATION RATE: 18 BRPM

## 2020-04-02 LAB
ABO/RH: NORMAL
ABSOLUTE EOS #: 0.32 K/UL (ref 0–0.4)
ABSOLUTE IMMATURE GRANULOCYTE: 0.53 K/UL (ref 0–0.3)
ABSOLUTE LYMPH #: 2.33 K/UL (ref 1–4.8)
ABSOLUTE MONO #: 0.11 K/UL (ref 0.1–0.8)
ALBUMIN SERPL-MCNC: 2 G/DL (ref 3.5–5.2)
ALBUMIN/GLOBULIN RATIO: 0.5 (ref 1–2.5)
ALLEN TEST: ABNORMAL
ALP BLD-CCNC: 176 U/L (ref 35–104)
ALT SERPL-CCNC: 46 U/L (ref 5–33)
ANION GAP SERPL CALCULATED.3IONS-SCNC: 12 MMOL/L (ref 9–17)
ANTIBODY SCREEN: NEGATIVE
ARM BAND NUMBER: NORMAL
AST SERPL-CCNC: 51 U/L
BASOPHILS # BLD: 0 % (ref 0–2)
BASOPHILS ABSOLUTE: 0 K/UL (ref 0–0.2)
BILIRUB SERPL-MCNC: 0.25 MG/DL (ref 0.3–1.2)
BLD PROD TYP BPU: NORMAL
BUN BLDV-MCNC: 27 MG/DL (ref 8–23)
BUN/CREAT BLD: ABNORMAL (ref 9–20)
CALCIUM SERPL-MCNC: 9.3 MG/DL (ref 8.6–10.4)
CHLORIDE BLD-SCNC: 103 MMOL/L (ref 98–107)
CO2: 28 MMOL/L (ref 20–31)
CREAT SERPL-MCNC: 0.51 MG/DL (ref 0.5–0.9)
CROSSMATCH RESULT: NORMAL
DIFFERENTIAL TYPE: ABNORMAL
DISPENSE STATUS BLOOD BANK: NORMAL
EKG ATRIAL RATE: 82 BPM
EKG P AXIS: 58 DEGREES
EKG P-R INTERVAL: 134 MS
EKG Q-T INTERVAL: 346 MS
EKG QRS DURATION: 70 MS
EKG QTC CALCULATION (BAZETT): 404 MS
EKG R AXIS: 51 DEGREES
EKG T AXIS: 44 DEGREES
EKG VENTRICULAR RATE: 82 BPM
EOSINOPHILS RELATIVE PERCENT: 3 % (ref 1–4)
EXPIRATION DATE: NORMAL
FIO2: 40
GFR AFRICAN AMERICAN: >60 ML/MIN
GFR NON-AFRICAN AMERICAN: >60 ML/MIN
GFR SERPL CREATININE-BSD FRML MDRD: ABNORMAL ML/MIN/{1.73_M2}
GFR SERPL CREATININE-BSD FRML MDRD: ABNORMAL ML/MIN/{1.73_M2}
GLUCOSE BLD-MCNC: 124 MG/DL (ref 65–105)
GLUCOSE BLD-MCNC: 128 MG/DL (ref 65–105)
GLUCOSE BLD-MCNC: 128 MG/DL (ref 70–99)
GLUCOSE BLD-MCNC: 129 MG/DL (ref 65–105)
HCT VFR BLD CALC: 28.9 % (ref 36.3–47.1)
HEMOGLOBIN: 8.9 G/DL (ref 11.9–15.1)
IMMATURE GRANULOCYTES: 5 %
LYMPHOCYTES # BLD: 22 % (ref 24–44)
MCH RBC QN AUTO: 29.4 PG (ref 25.2–33.5)
MCHC RBC AUTO-ENTMCNC: 30.8 G/DL (ref 28.4–34.8)
MCV RBC AUTO: 95.4 FL (ref 82.6–102.9)
MODE: ABNORMAL
MONOCYTES # BLD: 1 % (ref 1–7)
MORPHOLOGY: ABNORMAL
NEGATIVE BASE EXCESS, ART: ABNORMAL (ref 0–2)
NRBC AUTOMATED: 0.2 PER 100 WBC
O2 DEVICE/FLOW/%: ABNORMAL
PATIENT TEMP: ABNORMAL
PDW BLD-RTO: 16.5 % (ref 11.8–14.4)
PLATELET # BLD: 385 K/UL (ref 138–453)
PLATELET ESTIMATE: ABNORMAL
PMV BLD AUTO: 10.8 FL (ref 8.1–13.5)
POC HCO3: 32.2 MMOL/L (ref 21–28)
POC O2 SATURATION: 96 % (ref 94–98)
POC PCO2 TEMP: ABNORMAL MM HG
POC PCO2: 46.5 MM HG (ref 35–48)
POC PH TEMP: ABNORMAL
POC PH: 7.45 (ref 7.35–7.45)
POC PO2 TEMP: ABNORMAL MM HG
POC PO2: 81.1 MM HG (ref 83–108)
POSITIVE BASE EXCESS, ART: 7 (ref 0–3)
POTASSIUM SERPL-SCNC: 3.8 MMOL/L (ref 3.7–5.3)
RBC # BLD: 3.03 M/UL (ref 3.95–5.11)
RBC # BLD: ABNORMAL 10*6/UL
SAMPLE SITE: ABNORMAL
SEG NEUTROPHILS: 69 % (ref 36–66)
SEGMENTED NEUTROPHILS ABSOLUTE COUNT: 7.31 K/UL (ref 1.8–7.7)
SODIUM BLD-SCNC: 143 MMOL/L (ref 135–144)
TCO2 (CALC), ART: 34 MMOL/L (ref 22–29)
TOTAL PROTEIN: 6.3 G/DL (ref 6.4–8.3)
TRANSFUSION STATUS: NORMAL
TRIGL SERPL-MCNC: 465 MG/DL
UNIT DIVISION: 0
UNIT NUMBER: NORMAL
WBC # BLD: 10.6 K/UL (ref 3.5–11.3)
WBC # BLD: ABNORMAL 10*3/UL

## 2020-04-02 PROCEDURE — 6370000000 HC RX 637 (ALT 250 FOR IP): Performed by: STUDENT IN AN ORGANIZED HEALTH CARE EDUCATION/TRAINING PROGRAM

## 2020-04-02 PROCEDURE — 6360000002 HC RX W HCPCS: Performed by: STUDENT IN AN ORGANIZED HEALTH CARE EDUCATION/TRAINING PROGRAM

## 2020-04-02 PROCEDURE — 3600000004 HC SURGERY LEVEL 4 BASE: Performed by: SURGERY

## 2020-04-02 PROCEDURE — 82803 BLOOD GASES ANY COMBINATION: CPT

## 2020-04-02 PROCEDURE — 80053 COMPREHEN METABOLIC PANEL: CPT

## 2020-04-02 PROCEDURE — 2000000000 HC ICU R&B

## 2020-04-02 PROCEDURE — 2580000003 HC RX 258: Performed by: STUDENT IN AN ORGANIZED HEALTH CARE EDUCATION/TRAINING PROGRAM

## 2020-04-02 PROCEDURE — 31613 TRACHEOSTOMA REVJ SIMPLE: CPT | Performed by: SURGERY

## 2020-04-02 PROCEDURE — 85025 COMPLETE CBC W/AUTO DIFF WBC: CPT

## 2020-04-02 PROCEDURE — 99291 CRITICAL CARE FIRST HOUR: CPT | Performed by: INTERNAL MEDICINE

## 2020-04-02 PROCEDURE — 2709999900 HC NON-CHARGEABLE SUPPLY: Performed by: SURGERY

## 2020-04-02 PROCEDURE — 3700000001 HC ADD 15 MINUTES (ANESTHESIA): Performed by: SURGERY

## 2020-04-02 PROCEDURE — 3700000000 HC ANESTHESIA ATTENDED CARE: Performed by: SURGERY

## 2020-04-02 PROCEDURE — 71045 X-RAY EXAM CHEST 1 VIEW: CPT

## 2020-04-02 PROCEDURE — 2500000003 HC RX 250 WO HCPCS: Performed by: STUDENT IN AN ORGANIZED HEALTH CARE EDUCATION/TRAINING PROGRAM

## 2020-04-02 PROCEDURE — 6360000002 HC RX W HCPCS: Performed by: INTERNAL MEDICINE

## 2020-04-02 PROCEDURE — 99233 SBSQ HOSP IP/OBS HIGH 50: CPT | Performed by: INTERNAL MEDICINE

## 2020-04-02 PROCEDURE — 94640 AIRWAY INHALATION TREATMENT: CPT

## 2020-04-02 PROCEDURE — C9113 INJ PANTOPRAZOLE SODIUM, VIA: HCPCS | Performed by: STUDENT IN AN ORGANIZED HEALTH CARE EDUCATION/TRAINING PROGRAM

## 2020-04-02 PROCEDURE — 2500000003 HC RX 250 WO HCPCS: Performed by: NURSE ANESTHETIST, CERTIFIED REGISTERED

## 2020-04-02 PROCEDURE — 37799 UNLISTED PX VASCULAR SURGERY: CPT

## 2020-04-02 PROCEDURE — 0B21XFZ CHANGE TRACHEOSTOMY DEVICE IN TRACHEA, EXTERNAL APPROACH: ICD-10-PCS | Performed by: SURGERY

## 2020-04-02 PROCEDURE — 94003 VENT MGMT INPAT SUBQ DAY: CPT

## 2020-04-02 PROCEDURE — 93010 ELECTROCARDIOGRAM REPORT: CPT | Performed by: INTERNAL MEDICINE

## 2020-04-02 PROCEDURE — 3600000014 HC SURGERY LEVEL 4 ADDTL 15MIN: Performed by: SURGERY

## 2020-04-02 PROCEDURE — 94761 N-INVAS EAR/PLS OXIMETRY MLT: CPT

## 2020-04-02 PROCEDURE — 82947 ASSAY GLUCOSE BLOOD QUANT: CPT

## 2020-04-02 PROCEDURE — 84478 ASSAY OF TRIGLYCERIDES: CPT

## 2020-04-02 PROCEDURE — 2700000000 HC OXYGEN THERAPY PER DAY

## 2020-04-02 RX ORDER — ROCURONIUM BROMIDE 10 MG/ML
INJECTION, SOLUTION INTRAVENOUS PRN
Status: DISCONTINUED | OUTPATIENT
Start: 2020-04-02 | End: 2020-04-02 | Stop reason: SDUPTHER

## 2020-04-02 RX ADMIN — BISACODYL 10 MG: 10 SUPPOSITORY RECTAL at 10:00

## 2020-04-02 RX ADMIN — GABAPENTIN 300 MG: 250 SOLUTION ORAL at 14:40

## 2020-04-02 RX ADMIN — METOCLOPRAMIDE 10 MG: 5 INJECTION, SOLUTION INTRAMUSCULAR; INTRAVENOUS at 06:16

## 2020-04-02 RX ADMIN — PANTOPRAZOLE SODIUM 40 MG: 40 INJECTION, POWDER, FOR SOLUTION INTRAVENOUS at 09:59

## 2020-04-02 RX ADMIN — ACETAMINOPHEN 650 MG: 325 TABLET ORAL at 11:10

## 2020-04-02 RX ADMIN — DOCUSATE SODIUM 100 MG: 50 LIQUID ORAL at 21:10

## 2020-04-02 RX ADMIN — Medication 10 ML: at 10:00

## 2020-04-02 RX ADMIN — IPRATROPIUM BROMIDE AND ALBUTEROL SULFATE 1 AMPULE: .5; 3 SOLUTION RESPIRATORY (INHALATION) at 20:24

## 2020-04-02 RX ADMIN — IPRATROPIUM BROMIDE AND ALBUTEROL SULFATE 1 AMPULE: .5; 3 SOLUTION RESPIRATORY (INHALATION) at 15:21

## 2020-04-02 RX ADMIN — METOCLOPRAMIDE 10 MG: 5 INJECTION, SOLUTION INTRAMUSCULAR; INTRAVENOUS at 12:02

## 2020-04-02 RX ADMIN — POTASSIUM CHLORIDE: 2 INJECTION, SOLUTION, CONCENTRATE INTRAVENOUS at 18:30

## 2020-04-02 RX ADMIN — ESCITALOPRAM OXALATE 20 MG: 10 TABLET ORAL at 10:01

## 2020-04-02 RX ADMIN — HEPARIN SODIUM 5000 UNITS: 5000 INJECTION INTRAVENOUS; SUBCUTANEOUS at 14:40

## 2020-04-02 RX ADMIN — PROPOFOL 50 MCG/KG/MIN: 10 INJECTION, EMULSION INTRAVENOUS at 16:59

## 2020-04-02 RX ADMIN — SUCRALFATE 1 G: 1 TABLET ORAL at 18:35

## 2020-04-02 RX ADMIN — IPRATROPIUM BROMIDE AND ALBUTEROL SULFATE 1 AMPULE: .5; 3 SOLUTION RESPIRATORY (INHALATION) at 11:45

## 2020-04-02 RX ADMIN — QUETIAPINE FUMARATE 100 MG: 100 TABLET ORAL at 21:10

## 2020-04-02 RX ADMIN — ALBUTEROL SULFATE 2.5 MG: 2.5 SOLUTION RESPIRATORY (INHALATION) at 23:18

## 2020-04-02 RX ADMIN — SUCRALFATE 1 G: 1 TABLET ORAL at 23:47

## 2020-04-02 RX ADMIN — SODIUM CHLORIDE, PRESERVATIVE FREE 10 ML: 5 INJECTION INTRAVENOUS at 21:14

## 2020-04-02 RX ADMIN — MIDODRINE HYDROCHLORIDE 5 MG: 5 TABLET ORAL at 12:02

## 2020-04-02 RX ADMIN — ACETAMINOPHEN 650 MG: 325 TABLET ORAL at 15:15

## 2020-04-02 RX ADMIN — METOCLOPRAMIDE 10 MG: 5 INJECTION, SOLUTION INTRAMUSCULAR; INTRAVENOUS at 23:46

## 2020-04-02 RX ADMIN — PANTOPRAZOLE SODIUM 40 MG: 40 INJECTION, POWDER, FOR SOLUTION INTRAVENOUS at 21:10

## 2020-04-02 RX ADMIN — ANTI-FUNGAL POWDER MICONAZOLE NITRATE TALC FREE: 1.42 POWDER TOPICAL at 09:59

## 2020-04-02 RX ADMIN — PROPOFOL 50 MCG/KG/MIN: 10 INJECTION, EMULSION INTRAVENOUS at 09:07

## 2020-04-02 RX ADMIN — AMITRIPTYLINE HYDROCHLORIDE 25 MG: 25 TABLET, FILM COATED ORAL at 10:01

## 2020-04-02 RX ADMIN — ROCURONIUM BROMIDE 40 MG: 10 INJECTION INTRAVENOUS at 08:18

## 2020-04-02 RX ADMIN — HEPARIN SODIUM 5000 UNITS: 5000 INJECTION INTRAVENOUS; SUBCUTANEOUS at 21:10

## 2020-04-02 RX ADMIN — SUCRALFATE 1 G: 1 TABLET ORAL at 12:02

## 2020-04-02 RX ADMIN — CLONAZEPAM 0.5 MG: 0.5 TABLET ORAL at 22:04

## 2020-04-02 RX ADMIN — MINERAL OIL 45 ML: 1000 SOLUTION ORAL at 09:59

## 2020-04-02 RX ADMIN — MIDODRINE HYDROCHLORIDE 5 MG: 5 TABLET ORAL at 10:01

## 2020-04-02 RX ADMIN — GABAPENTIN 300 MG: 250 SOLUTION ORAL at 21:10

## 2020-04-02 RX ADMIN — MIDODRINE HYDROCHLORIDE 5 MG: 5 TABLET ORAL at 18:35

## 2020-04-02 RX ADMIN — PROPOFOL 50 MCG/KG/MIN: 10 INJECTION, EMULSION INTRAVENOUS at 21:07

## 2020-04-02 RX ADMIN — SUCRALFATE 1 G: 1 TABLET ORAL at 06:16

## 2020-04-02 RX ADMIN — PROPOFOL 50 MCG/KG/MIN: 10 INJECTION, EMULSION INTRAVENOUS at 12:49

## 2020-04-02 RX ADMIN — BUSPIRONE HYDROCHLORIDE 20 MG: 10 TABLET ORAL at 21:10

## 2020-04-02 RX ADMIN — AMITRIPTYLINE HYDROCHLORIDE 25 MG: 25 TABLET, FILM COATED ORAL at 22:04

## 2020-04-02 RX ADMIN — HEPARIN SODIUM 5000 UNITS: 5000 INJECTION INTRAVENOUS; SUBCUTANEOUS at 06:16

## 2020-04-02 RX ADMIN — BUSPIRONE HYDROCHLORIDE 20 MG: 10 TABLET ORAL at 14:40

## 2020-04-02 RX ADMIN — DOCUSATE SODIUM 100 MG: 50 LIQUID ORAL at 09:57

## 2020-04-02 RX ADMIN — Medication 10 MG/HR: at 07:31

## 2020-04-02 RX ADMIN — ANTI-FUNGAL POWDER MICONAZOLE NITRATE TALC FREE: 1.42 POWDER TOPICAL at 21:19

## 2020-04-02 RX ADMIN — GABAPENTIN 300 MG: 250 SOLUTION ORAL at 06:16

## 2020-04-02 RX ADMIN — AMITRIPTYLINE HYDROCHLORIDE 25 MG: 25 TABLET, FILM COATED ORAL at 14:40

## 2020-04-02 RX ADMIN — ALBUTEROL SULFATE 2.5 MG: 2.5 SOLUTION RESPIRATORY (INHALATION) at 04:03

## 2020-04-02 RX ADMIN — TRAZODONE HYDROCHLORIDE 100 MG: 100 TABLET ORAL at 22:04

## 2020-04-02 RX ADMIN — METOCLOPRAMIDE 10 MG: 5 INJECTION, SOLUTION INTRAMUSCULAR; INTRAVENOUS at 18:35

## 2020-04-02 RX ADMIN — FUROSEMIDE 20 MG: 10 INJECTION, SOLUTION INTRAMUSCULAR; INTRAVENOUS at 11:53

## 2020-04-02 RX ADMIN — PROPOFOL 50 MCG/KG/MIN: 10 INJECTION, EMULSION INTRAVENOUS at 06:26

## 2020-04-02 RX ADMIN — Medication 10 ML: at 21:11

## 2020-04-02 ASSESSMENT — PULMONARY FUNCTION TESTS
PIF_VALUE: 22
PIF_VALUE: 28
PIF_VALUE: 27
PIF_VALUE: 30
PIF_VALUE: 20
PIF_VALUE: 5
PIF_VALUE: 30
PIF_VALUE: 34
PIF_VALUE: 30
PIF_VALUE: 16
PIF_VALUE: 5
PIF_VALUE: 29
PIF_VALUE: 19
PIF_VALUE: 28
PIF_VALUE: 29
PIF_VALUE: 3
PIF_VALUE: 18
PIF_VALUE: 28
PIF_VALUE: 17
PIF_VALUE: 20
PIF_VALUE: 3
PIF_VALUE: 26
PIF_VALUE: 16
PIF_VALUE: 31
PIF_VALUE: 10
PIF_VALUE: 29
PIF_VALUE: 3
PIF_VALUE: 26
PIF_VALUE: 25
PIF_VALUE: 29
PIF_VALUE: 21
PIF_VALUE: 25
PIF_VALUE: 25
PIF_VALUE: 27
PIF_VALUE: 3
PIF_VALUE: 17
PIF_VALUE: 30
PIF_VALUE: 26
PIF_VALUE: 12
PIF_VALUE: 26
PIF_VALUE: 34
PIF_VALUE: 33

## 2020-04-02 ASSESSMENT — PAIN SCALES - GENERAL
PAINLEVEL_OUTOF10: 0
PAINLEVEL_OUTOF10: 0

## 2020-04-02 ASSESSMENT — COPD QUESTIONNAIRES: CAT_SEVERITY: NO INTERVAL CHANGE

## 2020-04-02 NOTE — OP NOTE
Urine Odor Malodorous 4/1/2020  6:18 PM   Output (mL) 110 mL 4/2/2020  6:00 AM       [REMOVED] NG/OG/NJ/NE Tube (Removed)   Surrounding Skin Dry; Intact 3/7/2020  8:00 AM   Securement device Yes 3/7/2020  8:00 AM   Status Suction-low intermittent 3/7/2020  8:00 AM   Placement Verified by External Catheter Length 3/7/2020  8:00 AM   NG/OG/NJ/NE External Measurement (cm) 24 cm 3/7/2020  8:00 AM   Drainage Appearance Bile 3/7/2020  8:00 AM   Output (mL) 0 ml 3/4/2020  4:20 PM       [REMOVED] NG/OG/NJ/NE Tube Nasogastric Right nostril (Removed)   Surrounding Skin Dry; Intact 3/27/2020  4:00 AM   Securement device Yes 3/27/2020  4:00 AM   Status Suction-low intermittent 3/26/2020  8:00 PM   Placement Verified by Gastric Contents 3/26/2020  8:00 PM   NG/OG/NJ/NE External Measurement (cm) 65 cm 3/26/2020  8:00 PM   Drainage Appearance Bile;Green 3/27/2020  4:00 AM   Tube Feeding Semi-elemental 3/25/2020  4:00 PM   Tube Feeding Status Stopped 3/25/2020  4:00 PM   Rate/Schedule 10 mL/hr 3/20/2020 12:00 AM   Tube Feeding Intake (mL) 60 ml 3/25/2020  2:00 PM   Free Water Flush (mL) 240 mL 3/26/2020 12:00 PM   Residual Volume (ml) 0 ml 3/20/2020 12:00 AM   Output (mL) 275 ml 3/27/2020  4:00 AM       [REMOVED] Urethral Catheter 16 fr (Removed)   Catheter Indications Acute urinary retention/obstruction 2/26/2020  8:00 AM   Site Assessment No urethral drainage 2/26/2020  8:00 AM   Urine Color Yellow 2/26/2020  8:00 AM   Urine Appearance Clear 2/26/2020  8:00 AM   Output (mL) 300 mL 2/27/2020  7:00 AM       [REMOVED] Urethral Catheter Temperature probe 16 fr (Removed)   Catheter Indications Acute urinary retention/obstruction; Need for fluid management in critically ill patients in a critical care setting not able to be managed by other means such as BSC with hat, bedpan, urinal, condom catheter, or short term intermittent urethral catherization 3/14/2020  4:00 PM   Securement Device Date Changed 02/29/20 3/13/2020  7:33 PM   Site Assessment No urethral drainage 3/14/2020  4:00 PM   Urine Color Yellow 3/14/2020  4:00 PM   Urine Appearance Clear 3/14/2020  4:00 PM   Output (mL) 1300 mL 3/14/2020  5:00 PM       History: This is a 76year old female with respiratory failure requiring tracheostomy. The initial procedure was performed one week ago. Patient requires a trach with inner cannula before discharged to her long term facility. Procedure, purpose, risks, benefits and alternatives were discussed in detail with patient's family and informed consent was obtained. Details: The patient was brought to the OR and remained on her hospital bed in supine position. A timeout was performed to confirm patient and procedure. Patient is receiving antibiotics in the ICU. General anesthesia was then induced. The patient was then prepped and draped in the usual sterile fashion. A shoulder bump was then placed to gently hyperextend the patient's neck. The patient was preoxygenated by Anesthesia. A 6.0 cuffed siley with inner cannula was opened and balloon was tested and found to be intact. The trach sutures were then removed. The ventilator was disconnected from the trach and a bougie was easily passed through the trachea. Old trach was passed off over the bougie and the replacement trach was then placed into the tract following the curve of the bougie. Inner cannula was then attached. The ventilator was then attached to the trach and end tidal was confirmed. The trach was then sutured in place using vicryl suture. One single interrupted stich was placed at the inferior portion of the incision, again with vicryl suture. Drain sponge and velcro strap were then applied. This concluded the case. Patient tolerated well and was transferred back to the ICU in stable condition. Dr Adriel Nayak was present for the entirety of the case.     Electronically signed by Vani Nazario MD on 4/2/2020 at 8:50 AM

## 2020-04-02 NOTE — PROGRESS NOTES
[facility] -Progress Note    Today's Date and Time: 4/2/2020, 12:20 PM    Impression :     · Acute respiratory failure s/p intubation 3-17, trach placement 3-27  · S/P PEG placement 3-27  · S/p EGD 3-17  · PUD  · GERD  · S/P hiatal hernia repair and Deshaun semi fundoplication 3-15-80  · Urine culture growing Candida  · Pulmonary edema  · Pleural effusions  · Acute blood loss anemia  · BENEDICT    Recommendations:   · Discontinue IV meropenem. Stop date 3-31-20  · Discontinued Diflucan 400 mg po x 7 days stop date 3-26  · Supportive care  · Monitor clinical progression  · Low probability of COVID, no need to test at this time  · Diuretics    Medical Decision Making/Summary/Discussion:4/2/2020     ·   Infection Control Recommendations   · Mineral Precautions    Antimicrobial Stewardship Recommendations     · Simplification of therapy  · Targeted therapy    Coordination of Outpatient Care:   · Estimated Length of IV antimicrobials:TBD  · Patient will need Midline Catheter Insertion: TBD  · Patient will need PICC line Insertion:  · Patient will need: Home IV , [place],  SNF,  LTAC:TBD  · Patient will need outpatient wound care:TBD    Chief complaint/reason for consultation:    High grade fever    History of Present Illness:   Gem Charles is a 76y.o.-year-old  female who was initially admitted on 2/29/2020. Patient seen at the request of Dr. Efren Kolb:    Patient has a history of severe GERD despite being on PPI, H2 blocker and Tums. She underwent laparoscopic robotic hiatal hernia repair and fundoplication last month on 02/24/2020 and postoperatively she developed hypoxia. CT chest done on 02/26/2020 showed bibasilar atelectasis/consolidation and patchy area of infiltrate in the right upper lobe. Patient was discharged on home oxygen on 2-28.      She presented back to [facility] ER on 2-29 with worsening shortness of breath, dyspnea as well as an episode of dark red emesis. Her SaO2 was 60 % in the ED. There was concern of aspiration. CTA chest done was negative for PE, showed bilateral pleural effusions and severe esophageal dilatation, surgery was consulted. Patient was transferred to [facility] for further management. Patient was started on noninvasive ventilation and then intubated and admitted to the ICU. She improved, was extubated on 3-7 to high flow O2. Pt was transferred out of the ICU on 3-13. On 3-14 pts hgb dropped to 6.3 and she developed coffee-ground then bright red emesis, and was again transferred back to medical ICU. General surgery performed endoscopy on 3/17 which showed an ulcer at the GE junction and mild gastritis with no active bleeding. After the procedure she developed acute respiratory distress and a fever of 39.8. She was emergently intubated and started on Meropenem and Vancomycin. ID is consulted for antibiotic management. Patient was continued on meropenem. Vancomycin D/C    CURRENT EXAMINATION: 3/31/20     Pt seen and examined in the ICU. S/P trach and PEG placement 3/27  Tracheal exchange in OR done on 4-2-20. Off fentanyl  On Propofol and midazolam  Off Levophed    Afebrile. Single elevation to 100.8F  VS stable  Remains on ventilator    Flat plate of abdomen 4-84-19 with nonobstructive gas pattern with retained enteric contrast in the colon  On TPN. Patient could not tolerate tube feeds. Has superficial venous clot Rt  Mid forearm to antecubital area    Chest x-rays (3-18-->3-21-20) show bilateral infiltrates   CXR 3-22-20: Shows worsening of bilateral infiltrates and new RLL opacity  CXR 3-23  Stable multifocal opacities accounting for positional differences  CXR 3-24: Improvement in diffuse interstitial opacities  CXR 3-27-20: Small Lt effusion, diffuse infiltrates bilaterally    Abdominal x-ray 3-28-20:  Nonobstructive bowel gas pattern. .  Similar appearance of retained enteric   contrast in the colon. HRR   Abd soft, quiet bowel sounds. CT scan 3-30-20: Fluid retention, bilateral effusions  Wt 85 Kg-->89.2 Kg    Labs, X rays reviewed: 4/2/2020    BUN: 16->15->14->17->26  Cr: 0.38-->0.46->0.48->0.50    WBC: 7.0-> 6.5-->9.9->9.4->12.3->10.3-->11.6  Hb: 9.0-> 8.0-->9.1->7.3->7.7->7.0  Plat: 228--> 200->173->215->349-->410    3-17 Viral PCR negative    Cultures:  Urine:  · 3-17: Candida albicans  Blood:  · 3-17: No growth  · 3-26 x 2 pending  Sputum :  · Tracheal aspirate 3-17: Pseudohyphae, few budding yeast cells seen. Candida albicans  Wound:  ·     Discussed with patient, RN. I have personally reviewed the past medical history, past surgical history, medications, social history, and family history, and I have updated the database accordingly.   Past Medical History:     Past Medical History:   Diagnosis Date    Anxiety     Arthritis     Back pain     Bronchitis     Caffeine use     8 coffee / day    Carpal tunnel syndrome     Cataracts, bilateral     Constipation     Depression     Diarrhea     GERD (gastroesophageal reflux disease)     Hyperlipidemia     Hypertension     Mumps     MVP (mitral valve prolapse)     Dr. Nancy Amezcua in May 2019    Recurrent UTI     Dr. Joselin Shrestha    Sinusitis     Ulcerative esophagitis     Wellness examination     Dr. Sj Naylor seen in Jan 2020       Past Surgical  History:     Past Surgical History:   Procedure Laterality Date    APPENDECTOMY      CARPAL TUNNEL RELEASE      COLONOSCOPY      CYSTOSCOPY      EGD  3/17/2020         EYE SURGERY      patricia IOL    GASTRIC FUNDOPLICATION N/A 5/42/9194    XI LAPAROSCOPIC ROBOTIC HIATAL HERNIA REPAIR, CHERYL FUNDOPLICATION performed by Kolton Treadwell MD at [facility] N/A 3/27/2020    EGD PEG TUBE PLACEMENT performed by Kolton Treadwell MD at [facility] POWER PICC TRIPLE  3/4/2020         HIATAL HERNIA REPAIR  02/24/2020    LAPAROSCOPIC ROBOTIC HIATAL tachycardic, rate and rhythm without murmurs, rubs, or gallops. Abdomen: Soft, non tender. Bowel sounds very quiet. No organomegaly. Gtube in place, site is clean  All four Extremities:Generalized edema, bilateral LE 2+ edema  Neurologic:  sedated  Skin: Warm and dry with good turgor. No signs of peripheral arterial or venous insufficiency. No ulcerations. No open wounds. Abd incisions clean    Medical Decision Making -Laboratory:   I have independently reviewed/ordered the following labs:    CBC with Differential:   Recent Labs     04/01/20  0403 04/01/20  1857 04/02/20  0510   WBC 11.6*  --  10.6   HGB 7.0* 8.6* 8.9*   HCT 23.8* 28.2* 28.9*     --  385   LYMPHOPCT 9*  --  22*   MONOPCT 8*  --  1     BMP:   Recent Labs     04/01/20  0403 04/02/20  0510    143   K 4.4 3.8    103   CO2 27 28   BUN 26* 27*   CREATININE 0.50 0.51   MG 2.1  --      Hepatic Function Panel:   Recent Labs     03/31/20  0447 04/02/20  0510   PROT 6.2* 6.3*   LABALBU  --  2.0*   BILITOT  --  0.25*   ALKPHOS  --  176*   ALT  --  46*   AST  --  51*     No results for input(s): RPR in the last 72 hours. No results for input(s): HIV in the last 72 hours. No results for input(s): BC in the last 72 hours. Lab Results   Component Value Date    MUCUS NOT REPORTED 03/17/2020    RBC 3.03 04/02/2020    TRICHOMONAS NOT REPORTED 03/17/2020    WBC 10.6 04/02/2020    YEAST MANY 03/17/2020    TURBIDITY CLOUDY 03/17/2020     Lab Results   Component Value Date    CREATININE 0.51 04/02/2020    GLUCOSE 128 04/02/2020       Medical Decision Making-Imaging:     3-24 CXR    EXAMINATION:   ONE XRAY VIEW OF THE CHEST       3/24/2020 6:46 am       COMPARISON:   03/23/2020       HISTORY:   ORDERING SYSTEM PROVIDED HISTORY: Intubated; eval for change   TECHNOLOGIST PROVIDED HISTORY:   Intubated; eval for change       FINDINGS:   NG tube courses into the abdomen.  ETT terminates about 2.2 cm above the   sangeeta.  There is right PICC line in place.

## 2020-04-02 NOTE — PROGRESS NOTES
PRN  artificial tears, , PRN  acetaminophen, 650 mg, Q4H PRN    Or  acetaminophen, 650 mg, Q6H PRN  albuterol, 2.5 mg, Q6H PRN  glucose, 15 g, PRN  dextrose, 12.5 g, PRN  glucagon (rDNA), 1 mg, PRN  dextrose, 100 mL/hr, PRN  LORazepam, 1 mg, Q6H PRN  dextromethorphan-guaiFENesin, 10 mL, Q4H PRN  sodium chloride flush, 10 mL, PRN  potassium chloride, 40 mEq, PRN    Or  potassium alternative oral replacement, 40 mEq, PRN    Or  potassium chloride, 10 mEq, PRN  magnesium hydroxide, 30 mL, Daily PRN  sodium chloride flush, 10 mL, PRN  glycerin moisturizing mouth spray, 2 spray, PRN  metoprolol, 5 mg, Q6H PRN  ondansetron, 4 mg, Q6H PRN        SUPPORT DEVICES: [x] Ventilator [] BIPAP  [] Nasal Cannula [] Room Air    VENT SETTINGS (Comprehensive) (if applicable):  Vent Information  $Ventilation: $Subsequent Day  Ventilator Started: Yes  Ventilator Stopped: Yes  Skin Assessment: Clean, dry, & intact  Equipment ID: [id number]  Equipment Changed: HME  Vent Type: Servo i  Vent Mode: PRVC  Vt Ordered: 400 mL  Pressure Ordered: 0  Rate Set: 26 bmp  Pressure Support: 8 cmH20  FiO2 : 40 %  Sensitivity: 1  PEEP/CPAP: 5  I Time/ I Time %: 0.7 s  Cuff Pressure (cm H2O): (mlt)  Humidification Source: HME  Humidification Temp: 31  Humidification Temp Measured: 31  Circuit Condensation: Drained  Nitric Oxide/Epoprostenol In Use?: No  Additional Respiratory  Assessments  Pulse: 115  Resp: (!) 40  SpO2: 95 %  End Tidal CO2: 37 (%)  pCO2 (TCOM, mmHg): 36 mmHg  Position: Semi-Shelton's  Humidification Source: HME  Humidification Temp: 31  Circuit Condensation: Drained  Oral Care Completed?: Yes  Oral Care: Mouthwash, Mouth swabbed, Mouth moisturizer, Mouth suctioned, Teeth brushed  Subglottic Suction Done?: No  Cuff Pressure (cm H2O): (mlt)  Skin barrier applied: No    ABGs:     Lab Results   Component Value Date    ILF4OHM 34 04/02/2020    FIO2 40.0 04/02/2020     Lactic Acid:   Lab Results   Component Value Date    LACTA 4.1 04/30/2018 100 mg at night. Continue with BuSpar, Lexapro, and trazodone. Ativan PRN. Off of Versed. 4. Sepsis with shock likely due to underlying aspiration pneumonitis. Resolved. Currently on Levophed 2. tracheal aspirate positive for pseudohyphae/hyphae. Urine culture grew Candida. Blood cultures negative so far. completed course of diflucan 400 stop date 3-26 and  meropenem . ID following along. 5. S/p hiatal hernia repair and Nissen fundoplication on 2/77/4427, s/p PEG tube 3/27. Surgery following along. 6. Emesis. Peg tube feed on hold now.        Yenny Looney MD  PGY-1, Internal medicine resident  [facility], [place], [place]

## 2020-04-02 NOTE — PROGRESS NOTES
[J69.0] 02/29/2020    Centrilobular emphysema (HCC) [J43.2]     Atelectasis [J98.11]     Pleural effusion [J90]     Esophageal dilatation [K22.8]        76 y.o. female s/p hiatal hernia repair with syeda fundoplication on 5/26/59   -s/p trach/peg 3/27/20     Plan:  1. Continue TPN  2. Carafate 4 times a day  3. Protonix BID  4. Continue aggressive bowel reg  5. Continue reglan  6. Medical management per primary team.  7. After discussion with patients margarette Covarrubias, agreeable to tracheostomy exchange. Procedure scheduled for this AM.     Electronically signed by Sudha Hope MD  on 4/2/2020 at 7:03 AM       Senior Addendum:  Patient seen and examined. Trach site with erythema and secretions noted. Breathing much improved today. More comfortable. No green emesis, but did have 450 out of G tube overnight. Plan: Proceed to OR for tracheostomy evaluation and exchange.     Electronically signed by Anamaria John DO on 4/2/2020 at 7:29 AM

## 2020-04-02 NOTE — PROGRESS NOTES
Nutrition Assessment (Parenteral Nutrition)    Type and Reason for Visit: Reassess    Nutrition Recommendations:  -Continue NPO status  -Recommend decreasing TPN 2-in-1 central custom @ 65 mL/hr (1560 mLs) + 200 gms dextrose + 50 gms AA ~> 1040 kcals, 90 gms protein (w/ propofol running ~>1742 kcals/d)  -Restart trickle tube feeding as able of Vital 1.2 (Semi-elemental) @ 10 mL/hr - as able, slowly advance to goal rate of 55 mL/hr  -Will continue to monitor TPN and labds    Nutrition Assessment: Pt remains NPO and intubated. TPN continues. Tube feeding remains on hold - 900 mL G-tube output. Pt s/p trach exchange this morning. Will continue to monitor. Malnutrition Assessment:  · Malnutrition Status: At risk for malnutrition  · Context: Acute illness or injury  · Findings of the 6 clinical characteristics of malnutrition (Minimum of 2 out of 6 clinical characteristics is required to make the diagnosis of moderate or severe Protein Calorie Malnutrition based on AND/ASPEN Guidelines):  1. Energy Intake-Greater than 75% of estimated energy requirement, Greater than or equal to 7 days    2. Weight Loss-No significant weight loss, (3 weeks)  3. Fat Loss-Mild subcutaneous fat loss, Orbital  4. Muscle Loss-No significant muscle mass loss,    5. Fluid Accumulation-Moderate to severe fluid accumulation, Extremities, Generalized  6.   Strength-Not measured    Nutrition Risk Level: High    Nutrient Needs:  · Estimated Daily Total Kcal: 3078-7541 kcal/day  · Estimated Daily Protein (g):  g pro/day     Nutrition Diagnosis:   · Problem: Inadequate oral intake  · Etiology: related to Impaired respiratory function-inability to consume food, Alteration in GI function     Signs and symptoms:  as evidenced by NPO status due to medical condition, Nutrition support - PN    Objective Information:  · Wound Type: (non-healing wound )  · Current Nutrition Therapies:  · Oral Diet Orders: NPO   · Tube Feeding (TF) Orders:

## 2020-04-03 LAB
ABSOLUTE EOS #: 0 K/UL (ref 0–0.4)
ABSOLUTE IMMATURE GRANULOCYTE: 0.68 K/UL (ref 0–0.3)
ABSOLUTE LYMPH #: 2.26 K/UL (ref 1–4.8)
ABSOLUTE MONO #: 0.45 K/UL (ref 0.1–0.8)
ALLEN TEST: ABNORMAL
ANION GAP SERPL CALCULATED.3IONS-SCNC: 12 MMOL/L (ref 9–17)
BASOPHILS # BLD: 0 % (ref 0–2)
BASOPHILS ABSOLUTE: 0 K/UL (ref 0–0.2)
BUN BLDV-MCNC: 26 MG/DL (ref 8–23)
BUN/CREAT BLD: ABNORMAL (ref 9–20)
CALCIUM SERPL-MCNC: 9.4 MG/DL (ref 8.6–10.4)
CHLORIDE BLD-SCNC: 103 MMOL/L (ref 98–107)
CO2: 27 MMOL/L (ref 20–31)
CREAT SERPL-MCNC: 0.52 MG/DL (ref 0.5–0.9)
DIFFERENTIAL TYPE: ABNORMAL
EOSINOPHILS RELATIVE PERCENT: 0 % (ref 1–4)
FIO2: 40
GFR AFRICAN AMERICAN: >60 ML/MIN
GFR NON-AFRICAN AMERICAN: >60 ML/MIN
GFR SERPL CREATININE-BSD FRML MDRD: ABNORMAL ML/MIN/{1.73_M2}
GFR SERPL CREATININE-BSD FRML MDRD: ABNORMAL ML/MIN/{1.73_M2}
GLUCOSE BLD-MCNC: 104 MG/DL (ref 65–105)
GLUCOSE BLD-MCNC: 123 MG/DL (ref 70–99)
GLUCOSE BLD-MCNC: 127 MG/DL (ref 65–105)
GLUCOSE BLD-MCNC: 133 MG/DL (ref 65–105)
GLUCOSE BLD-MCNC: 134 MG/DL (ref 74–100)
GLUCOSE BLD-MCNC: 139 MG/DL (ref 65–105)
HCT VFR BLD CALC: 30.9 % (ref 36.3–47.1)
HEMOGLOBIN: 9.6 G/DL (ref 11.9–15.1)
IMMATURE GRANULOCYTES: 6 %
LYMPHOCYTES # BLD: 20 % (ref 24–44)
MAGNESIUM: 2.1 MG/DL (ref 1.6–2.6)
MCH RBC QN AUTO: 30.1 PG (ref 25.2–33.5)
MCHC RBC AUTO-ENTMCNC: 31.1 G/DL (ref 28.4–34.8)
MCV RBC AUTO: 96.9 FL (ref 82.6–102.9)
MODE: ABNORMAL
MONOCYTES # BLD: 4 % (ref 1–7)
MORPHOLOGY: ABNORMAL
NEGATIVE BASE EXCESS, ART: ABNORMAL (ref 0–2)
NRBC AUTOMATED: 0 PER 100 WBC
O2 DEVICE/FLOW/%: ABNORMAL
PATIENT TEMP: 37
PDW BLD-RTO: 16.4 % (ref 11.8–14.4)
PHOSPHORUS: 4 MG/DL (ref 2.6–4.5)
PLATELET # BLD: 439 K/UL (ref 138–453)
PLATELET ESTIMATE: ABNORMAL
PMV BLD AUTO: 10.7 FL (ref 8.1–13.5)
POC HCO3: 31.5 MMOL/L (ref 21–28)
POC O2 SATURATION: 95 % (ref 94–98)
POC PCO2 TEMP: ABNORMAL MM HG
POC PCO2: 47.5 MM HG (ref 35–48)
POC PH TEMP: ABNORMAL
POC PH: 7.43 (ref 7.35–7.45)
POC PO2 TEMP: ABNORMAL MM HG
POC PO2: 76.2 MM HG (ref 83–108)
POSITIVE BASE EXCESS, ART: 6 (ref 0–3)
POTASSIUM SERPL-SCNC: 4 MMOL/L (ref 3.7–5.3)
RBC # BLD: 3.19 M/UL (ref 3.95–5.11)
RBC # BLD: ABNORMAL 10*6/UL
SAMPLE SITE: ABNORMAL
SEG NEUTROPHILS: 70 % (ref 36–66)
SEGMENTED NEUTROPHILS ABSOLUTE COUNT: 7.91 K/UL (ref 1.8–7.7)
SODIUM BLD-SCNC: 142 MMOL/L (ref 135–144)
TCO2 (CALC), ART: 33 MMOL/L (ref 22–29)
WBC # BLD: 11.3 K/UL (ref 3.5–11.3)
WBC # BLD: ABNORMAL 10*3/UL

## 2020-04-03 PROCEDURE — 84100 ASSAY OF PHOSPHORUS: CPT

## 2020-04-03 PROCEDURE — 6370000000 HC RX 637 (ALT 250 FOR IP): Performed by: STUDENT IN AN ORGANIZED HEALTH CARE EDUCATION/TRAINING PROGRAM

## 2020-04-03 PROCEDURE — 6360000002 HC RX W HCPCS: Performed by: STUDENT IN AN ORGANIZED HEALTH CARE EDUCATION/TRAINING PROGRAM

## 2020-04-03 PROCEDURE — 82803 BLOOD GASES ANY COMBINATION: CPT

## 2020-04-03 PROCEDURE — 94640 AIRWAY INHALATION TREATMENT: CPT

## 2020-04-03 PROCEDURE — 2580000003 HC RX 258: Performed by: STUDENT IN AN ORGANIZED HEALTH CARE EDUCATION/TRAINING PROGRAM

## 2020-04-03 PROCEDURE — 83735 ASSAY OF MAGNESIUM: CPT

## 2020-04-03 PROCEDURE — 94003 VENT MGMT INPAT SUBQ DAY: CPT

## 2020-04-03 PROCEDURE — 80048 BASIC METABOLIC PNL TOTAL CA: CPT

## 2020-04-03 PROCEDURE — 2000000000 HC ICU R&B

## 2020-04-03 PROCEDURE — 2500000003 HC RX 250 WO HCPCS: Performed by: STUDENT IN AN ORGANIZED HEALTH CARE EDUCATION/TRAINING PROGRAM

## 2020-04-03 PROCEDURE — 99233 SBSQ HOSP IP/OBS HIGH 50: CPT | Performed by: INTERNAL MEDICINE

## 2020-04-03 PROCEDURE — 82947 ASSAY GLUCOSE BLOOD QUANT: CPT

## 2020-04-03 PROCEDURE — 2700000000 HC OXYGEN THERAPY PER DAY

## 2020-04-03 PROCEDURE — C9113 INJ PANTOPRAZOLE SODIUM, VIA: HCPCS | Performed by: STUDENT IN AN ORGANIZED HEALTH CARE EDUCATION/TRAINING PROGRAM

## 2020-04-03 PROCEDURE — 94761 N-INVAS EAR/PLS OXIMETRY MLT: CPT

## 2020-04-03 PROCEDURE — 94770 HC ETCO2 MONITOR DAILY: CPT

## 2020-04-03 PROCEDURE — 99291 CRITICAL CARE FIRST HOUR: CPT | Performed by: INTERNAL MEDICINE

## 2020-04-03 PROCEDURE — 85025 COMPLETE CBC W/AUTO DIFF WBC: CPT

## 2020-04-03 PROCEDURE — 37799 UNLISTED PX VASCULAR SURGERY: CPT

## 2020-04-03 PROCEDURE — 97110 THERAPEUTIC EXERCISES: CPT

## 2020-04-03 RX ORDER — 0.9 % SODIUM CHLORIDE 0.9 %
500 INTRAVENOUS SOLUTION INTRAVENOUS ONCE
Status: DISCONTINUED | OUTPATIENT
Start: 2020-04-03 | End: 2020-04-06 | Stop reason: HOSPADM

## 2020-04-03 RX ADMIN — DEXMEDETOMIDINE HYDROCHLORIDE 0.2 MCG/KG/HR: 100 INJECTION, SOLUTION INTRAVENOUS at 12:24

## 2020-04-03 RX ADMIN — SUCRALFATE 1 G: 1 TABLET ORAL at 17:25

## 2020-04-03 RX ADMIN — BUSPIRONE HYDROCHLORIDE 20 MG: 10 TABLET ORAL at 08:27

## 2020-04-03 RX ADMIN — CLONAZEPAM 0.5 MG: 0.5 TABLET ORAL at 08:34

## 2020-04-03 RX ADMIN — METOCLOPRAMIDE 10 MG: 5 INJECTION, SOLUTION INTRAMUSCULAR; INTRAVENOUS at 17:25

## 2020-04-03 RX ADMIN — IPRATROPIUM BROMIDE AND ALBUTEROL SULFATE 1 AMPULE: .5; 3 SOLUTION RESPIRATORY (INHALATION) at 08:43

## 2020-04-03 RX ADMIN — PANTOPRAZOLE SODIUM 40 MG: 40 INJECTION, POWDER, FOR SOLUTION INTRAVENOUS at 08:27

## 2020-04-03 RX ADMIN — BISACODYL 10 MG: 10 SUPPOSITORY RECTAL at 08:27

## 2020-04-03 RX ADMIN — METOCLOPRAMIDE 10 MG: 5 INJECTION, SOLUTION INTRAMUSCULAR; INTRAVENOUS at 06:19

## 2020-04-03 RX ADMIN — GABAPENTIN 300 MG: 250 SOLUTION ORAL at 12:25

## 2020-04-03 RX ADMIN — Medication 10 ML: at 08:30

## 2020-04-03 RX ADMIN — FENTANYL CITRATE 100 MCG: 50 INJECTION, SOLUTION INTRAMUSCULAR; INTRAVENOUS at 13:42

## 2020-04-03 RX ADMIN — PANTOPRAZOLE SODIUM 40 MG: 40 INJECTION, POWDER, FOR SOLUTION INTRAVENOUS at 21:39

## 2020-04-03 RX ADMIN — Medication 10 ML: at 08:28

## 2020-04-03 RX ADMIN — AMITRIPTYLINE HYDROCHLORIDE 25 MG: 25 TABLET, FILM COATED ORAL at 08:27

## 2020-04-03 RX ADMIN — ANTI-FUNGAL POWDER MICONAZOLE NITRATE TALC FREE: 1.42 POWDER TOPICAL at 08:31

## 2020-04-03 RX ADMIN — IPRATROPIUM BROMIDE AND ALBUTEROL SULFATE 1 AMPULE: .5; 3 SOLUTION RESPIRATORY (INHALATION) at 12:00

## 2020-04-03 RX ADMIN — GABAPENTIN 300 MG: 250 SOLUTION ORAL at 21:08

## 2020-04-03 RX ADMIN — IPRATROPIUM BROMIDE AND ALBUTEROL SULFATE 1 AMPULE: .5; 3 SOLUTION RESPIRATORY (INHALATION) at 15:38

## 2020-04-03 RX ADMIN — AMITRIPTYLINE HYDROCHLORIDE 25 MG: 25 TABLET, FILM COATED ORAL at 12:25

## 2020-04-03 RX ADMIN — BUSPIRONE HYDROCHLORIDE 20 MG: 10 TABLET ORAL at 12:25

## 2020-04-03 RX ADMIN — ALBUTEROL SULFATE 2.5 MG: 2.5 SOLUTION RESPIRATORY (INHALATION) at 03:31

## 2020-04-03 RX ADMIN — DEXMEDETOMIDINE HYDROCHLORIDE 0.4 MCG/KG/HR: 100 INJECTION, SOLUTION INTRAVENOUS at 21:07

## 2020-04-03 RX ADMIN — ANTI-FUNGAL POWDER MICONAZOLE NITRATE TALC FREE: 1.42 POWDER TOPICAL at 21:07

## 2020-04-03 RX ADMIN — SUCRALFATE 1 G: 1 TABLET ORAL at 12:26

## 2020-04-03 RX ADMIN — FUROSEMIDE 20 MG: 10 INJECTION, SOLUTION INTRAMUSCULAR; INTRAVENOUS at 08:27

## 2020-04-03 RX ADMIN — Medication 10 ML: at 08:29

## 2020-04-03 RX ADMIN — FENTANYL CITRATE 100 MCG: 50 INJECTION, SOLUTION INTRAMUSCULAR; INTRAVENOUS at 17:25

## 2020-04-03 RX ADMIN — QUETIAPINE FUMARATE 100 MG: 100 TABLET ORAL at 22:10

## 2020-04-03 RX ADMIN — GABAPENTIN 300 MG: 250 SOLUTION ORAL at 06:19

## 2020-04-03 RX ADMIN — CLONAZEPAM 0.5 MG: 0.5 TABLET ORAL at 21:06

## 2020-04-03 RX ADMIN — HEPARIN SODIUM 5000 UNITS: 5000 INJECTION INTRAVENOUS; SUBCUTANEOUS at 06:19

## 2020-04-03 RX ADMIN — IPRATROPIUM BROMIDE AND ALBUTEROL SULFATE 1 AMPULE: .5; 3 SOLUTION RESPIRATORY (INHALATION) at 20:23

## 2020-04-03 RX ADMIN — FENTANYL CITRATE 100 MCG: 50 INJECTION, SOLUTION INTRAMUSCULAR; INTRAVENOUS at 19:10

## 2020-04-03 RX ADMIN — BUSPIRONE HYDROCHLORIDE 20 MG: 10 TABLET ORAL at 21:06

## 2020-04-03 RX ADMIN — METOCLOPRAMIDE 10 MG: 5 INJECTION, SOLUTION INTRAMUSCULAR; INTRAVENOUS at 12:25

## 2020-04-03 RX ADMIN — FENTANYL CITRATE 100 MCG: 50 INJECTION, SOLUTION INTRAMUSCULAR; INTRAVENOUS at 10:24

## 2020-04-03 RX ADMIN — CALCIUM GLUCONATE: 98 INJECTION, SOLUTION INTRAVENOUS at 18:08

## 2020-04-03 RX ADMIN — DOCUSATE SODIUM 100 MG: 50 LIQUID ORAL at 08:28

## 2020-04-03 RX ADMIN — PROPOFOL 50 MCG/KG/MIN: 10 INJECTION, EMULSION INTRAVENOUS at 04:51

## 2020-04-03 RX ADMIN — Medication 10 ML: at 21:40

## 2020-04-03 RX ADMIN — HEPARIN SODIUM 5000 UNITS: 5000 INJECTION INTRAVENOUS; SUBCUTANEOUS at 12:25

## 2020-04-03 RX ADMIN — ESCITALOPRAM OXALATE 20 MG: 10 TABLET ORAL at 08:27

## 2020-04-03 RX ADMIN — POLYETHYLENE GLYCOL 3350 17 G: 17 POWDER, FOR SOLUTION ORAL at 08:34

## 2020-04-03 RX ADMIN — AMITRIPTYLINE HYDROCHLORIDE 25 MG: 25 TABLET, FILM COATED ORAL at 21:06

## 2020-04-03 RX ADMIN — TRAZODONE HYDROCHLORIDE 100 MG: 100 TABLET ORAL at 21:39

## 2020-04-03 RX ADMIN — HEPARIN SODIUM 5000 UNITS: 5000 INJECTION INTRAVENOUS; SUBCUTANEOUS at 21:06

## 2020-04-03 RX ADMIN — SUCRALFATE 1 G: 1 TABLET ORAL at 06:19

## 2020-04-03 RX ADMIN — PROPOFOL 40 MCG/KG/MIN: 10 INJECTION, EMULSION INTRAVENOUS at 09:27

## 2020-04-03 ASSESSMENT — PULMONARY FUNCTION TESTS
PIF_VALUE: 11
PIF_VALUE: 24
PIF_VALUE: 14
PIF_VALUE: 13
PIF_VALUE: 18
PIF_VALUE: 25
PIF_VALUE: 12

## 2020-04-03 ASSESSMENT — PAIN SCALES - GENERAL: PAINLEVEL_OUTOF10: 8

## 2020-04-03 NOTE — PROGRESS NOTES
12.3->10.3-->11.6->11.3  Hb: 9.1->7.3->7.7->7.->9.6  Plat: 349-->410->439    3-17 Viral PCR negative    Cultures:  Urine:  · 3-17: Candida albicans  Blood:  · 3-17: No growth  · 3-26 x 2 no growth  Sputum :  · Tracheal aspirate 3-17: Pseudohyphae, few budding yeast cells seen. Candida albicans  Wound:  ·     Discussed with patient, RN. I have personally reviewed the past medical history, past surgical history, medications, social history, and family history, and I have updated the database accordingly.   Past Medical History:     Past Medical History:   Diagnosis Date    Anxiety     Arthritis     Back pain     Bronchitis     Caffeine use     8 coffee / day    Carpal tunnel syndrome     Cataracts, bilateral     Constipation     Depression     Diarrhea     GERD (gastroesophageal reflux disease)     Hyperlipidemia     Hypertension     Mumps     MVP (mitral valve prolapse)     Dr. Kennedy Marshall in May 2019    Recurrent UTI     Dr. Zackery Skelton    Sinusitis     Ulcerative esophagitis     Wellness examination     Dr. Rosina Kenyon seen in Jan 2020       Past Surgical  History:     Past Surgical History:   Procedure Laterality Date    APPENDECTOMY      CARPAL TUNNEL RELEASE      COLONOSCOPY      CYSTOSCOPY      EGD  3/17/2020         EYE SURGERY      patricia IOL    GASTRIC FUNDOPLICATION N/A 5/77/7623    XI LAPAROSCOPIC ROBOTIC HIATAL HERNIA REPAIR, CHERYL FUNDOPLICATION performed by Mackenzie Taylor MD at [facility] N/A 3/27/2020    EGD PEG TUBE PLACEMENT performed by Mackenzie Taylor MD at [address] CATH POWER PICC TRIPLE  3/4/2020         HIATAL HERNIA REPAIR  02/24/2020    LAPAROSCOPIC ROBOTIC HIATAL HERNIA REPAIR, CHERYL FUNDOPLICATION     HYSTERECTOMY      Abdominal    JOINT REPLACEMENT Right     right hip    OVARY REMOVAL      RHINOPLASTY      TONSILLECTOMY      TOTAL HIP ARTHROPLASTY      TOTAL NEPHRECTOMY      atrophic from infections, age 14    TRACHEOSTOMY N/A 3/27/2020    **ADD ON **WANTS 10:00AM **TRACHEOTOMY performed by Deonna Cheema MD at [address] N/A 4/2/2020    TRACHEOTOMY EXCHANGE performed by Deonna Cheema MD at [address] ENDOSCOPY N/A 3/17/2020    BEDSIDE EGD ESOPHAGOGASTRODUODENOSCOPY (ICU) performed by Deonna Cheema MD at [facility]       Medications:      QUEtiapine  100 mg Oral Nightly    miconazole   Topical BID    amitriptyline  25 mg Oral TID    docusate  100 mg Oral BID    albuterol  2.5 mg Nebulization BID    mineral oil  45 mL Per G Tube Daily    polyethylene glycol  17 g Per G Tube Daily    heparin (porcine)  5,000 Units Subcutaneous 3 times per day    furosemide  20 mg Intravenous Daily    ipratropium-albuterol  1 ampule Inhalation 4x daily    labetalol  10 mg Intravenous Once    gabapentin  300 mg Oral 3 times per day    insulin lispro  0-6 Units Subcutaneous Q6H    sucralfate  1 g Oral 4 times per day    pantoprazole  40 mg Intravenous BID    And    sodium chloride (PF)  10 mL Intravenous BID    metoclopramide  10 mg Intravenous Q6H    bisacodyl  10 mg Rectal Daily    lidocaine 1 % injection  5 mL Intradermal Once    sodium chloride flush  10 mL Intravenous 2 times per day    traZODone  100 mg Oral Nightly    magnesium citrate  296 mL Oral Once    clonazePAM  0.5 mg Oral BID    [Held by provider] amLODIPine  10 mg Oral Nightly    sodium chloride flush  10 mL Intravenous 2 times per day    busPIRone  20 mg Oral TID    [Held by provider] metoprolol succinate  25 mg Oral Nightly    sodium chloride (PF)  10 mL Intravenous Daily    escitalopram  20 mg Oral Daily       Social History:     Social History     Socioeconomic History    Marital status:       Spouse name: Not on file    Number of children: 2    Years of education: Not on file    Highest education level: Not on file   Occupational History    Occupation: palpitations. No shortness of breath. No HOLLAND  Lung: No shortness of breath or cough. No sputum production  Abdomen: No nausea, vomiting, diarrhea, or abdominal pain. Vernestine Ket No cramps. Genitourinary: No increased urinary frequency, or dysuria. No hematuria. No suprapubic or CVA pain  Musculoskeletal: No muscle aches or pains. No joint effusions, swelling or deformities  Hematologic: No bleeding or bruising. Neurologic: No headache, weakness, numbness, or tingling. Integument: No rash, no ulcers. Psychiatric: No depression. Endocrine: No polyuria, no polydipsia, no polyphagia. Physical Examination :     Patient Vitals for the past 8 hrs:   BP Temp Temp src Pulse Resp SpO2   04/03/20 1100 -- -- -- 120 (!) 38 --   04/03/20 1000 -- -- -- 110 (!) 38 --   04/03/20 0900 -- -- -- 110 (!) 39 96 %   04/03/20 0848 -- -- -- 115 (!) 40 97 %   04/03/20 0800 (!) 148/67 99.5 °F (37.5 °C) CORE 87 29 100 %   04/03/20 0438 -- -- -- -- (!) 32 96 %     General Appearance: Sedated, trached,   Head:  Normocephalic, no trauma  Eyes: Pupils equal, round, reactive to light   Neck:Supple, without lymphadenopathy. Thyroid normal, No bruits. Trach site with small amount of blood  Pulmonary/Chest:  Coarse breath sounds/ crackles appreciated diffusely   Cardiovascular: Regular tachycardic, rate and rhythm without murmurs, rubs, or gallops. Abdomen: Soft, non tender. Bowel sounds hypoactive. No organomegaly. Gtube in place, site is clean  : [place] with clear urine  All four Extremities:Generalized edema, bilateral LE 2+ edema  Neurologic:  sedated  Skin: Warm and dry with good turgor. No signs of peripheral arterial or venous insufficiency. No ulcerations. No open wounds.  Abd incisions clean    Medical Decision Making -Laboratory:   I have independently reviewed/ordered the following labs:    CBC with Differential:   Recent Labs     04/02/20  0510 04/03/20  0458   WBC 10.6 11.3   HGB 8.9* 9.6*   HCT 28.9* 30.9*    439   LYMPHOPCT 22* 20* MONOPCT 1 4     BMP:   Recent Labs     20  0403 20  0510 20  0459    143 142   K 4.4 3.8 4.0    103 103   CO2 27 28 27   BUN 26* 27* 26*   CREATININE 0.50 0.51 0.52   MG 2.1  --  2.1     Hepatic Function Panel:   Recent Labs     20  0510   PROT 6.3*   LABALBU 2.0*   BILITOT 0.25*   ALKPHOS 176*   ALT 46*   AST 51*     No results for input(s): RPR in the last 72 hours. No results for input(s): HIV in the last 72 hours. No results for input(s): BC in the last 72 hours. Lab Results   Component Value Date    MUCUS NOT REPORTED 2020    RBC 3.19 2020    TRICHOMONAS NOT REPORTED 2020    WBC 11.3 2020    YEAST MANY 2020    TURBIDITY CLOUDY 2020     Lab Results   Component Value Date    CREATININE 0.52 2020    GLUCOSE 123 2020       Medical Decision Making-Imagin- CXR    EXAMINATION:   ONE XRAY VIEW OF THE CHEST       2020 1:22 pm       COMPARISON:   2020       HISTORY:   ORDERING SYSTEM PROVIDED HISTORY: s/p trach exchange   TECHNOLOGIST PROVIDED HISTORY:   s/p trach exchange   Acuity: Unknown   Type of Exam: Unknown       FINDINGS:   Tracheostomy tube in place appears to be in satisfactory position.  The heart   size is stable.  Bilateral pulmonary opacities are not significantly changed. No pneumothorax.  Bilateral pleural effusions.           Impression   1. Tracheostomy tube appears to be in satisfactory position   2.  Persistent bilateral pulmonary opacities and bilateral pleural effusions         3-24 CXR    EXAMINATION:   ONE XRAY VIEW OF THE CHEST       3/24/2020 6:46 am       COMPARISON:   2020       HISTORY:   ORDERING SYSTEM PROVIDED HISTORY: Intubated; eval for change   TECHNOLOGIST PROVIDED HISTORY:   Intubated; eval for change       FINDINGS:   NG tube courses into the abdomen.  ETT terminates about 2.2 cm above the   sangeeta.  There is right PICC line in place.       Normal cardiomediastinal silhouette.  No change in diffuse reticular   opacities with superimposed patchy airspace disease at the right lung base. A more homogeneous left lung base opacification also noted probably   combination of consolidation and effusion.  Overall no change.  No   pneumothorax.           Impression   No change in diffuse interstitial opacities with superimposed bibasilar   airspace disease left greater than right. 3-23 CXR    EXAMINATION:   ONE XRAY VIEW OF THE CHEST       3/23/2020 6:51 am       COMPARISON:   03/22/2020       HISTORY:   ORDERING SYSTEM PROVIDED HISTORY: Intubated; eval for change   TECHNOLOGIST PROVIDED HISTORY:   Intubated; eval for change   Reason for Exam: uprt port   Acuity: Unknown   Type of Exam: Unknown       Respiratory failure.       FINDINGS:   Endotracheal tube projects 2.6 cm from the sangeeta.  Enteric tube passes   beneath the diaphragm.  Enlarged heart.  Multifocal opacities right greater   than left, similar to before accounting for positional differences.  No   pneumothorax.           Impression   Stable multifocal opacities accounting for positional differences. CT chest PE: 3/17  CT PA: No gross pulmonary embolism. 2. Bilateral pleural effusion with bibasilar relaxation atelectasis could be   infectious or inflammatory including sequela from aspiration pneumonitis or   sequela from CHF. 3. Breathing motion greatly blurs detail.  Even considering this, there   appears to be some patchy ground-glass or alveolar density lateral within the   mid and upper lungs bilateral, right greater than left which can be seen with   pneumonitis or pulmonary edema. 4. Mildly dilated esophagus with air-fluid level. 5. No pneumomediastinum. 6. Mild mediastinal lymph node enlargement is very likely reactive. 7. CT ABDOMEN AND PELVIS: No CT evidence of an acute intra-abdominal or   intrapelvic process. 8. Hepatic steatosis. 9. Right nephrectomy. Chest x-ray 3/18:  . Not Detected  Not Detected   Parainfluenza 3 PCR Latest Ref Range: Not Detected  Not Detected   Parainfluenza 4 PCR Latest Ref Range: Not Detected  Not Detected   Resp Syncytial Virus PCR Latest Ref Range: Not Detected  Not Detected   Mycoplasma pneumo by PCR Latest Ref Range: Not Detected  Not Detected       3/19/2020  7:50 AM - [place], [place] Incoming Lab Results From [facility]     Specimen Information: Urine        Component Collected Lab   Specimen Description 03/17/2020  2:[address]  [address]   . URINE    Special Requests 03/17/2020  2:47  [address]   NOT REPORTED    Culture Abnormal  03/17/2020  2:47  [address]   PRESUMPTIVE FLORA ALBICANS >[id number] CFU/ML    Testing Performed By       Medical Decision Making-Other:     Note:  · Labs, medications, radiologic studies were reviewed with personal review of films  · Moderate Large amounts of data were reviewed  · Discussed with nursing Staff, Discharge planner  · Infection Control and Prevention measures reviewed  · All prior entries were reviewed  · Administer medications as ordered  · Prognosis: Guarded  · Discharge planning reviewed  · Follow up as outpatient. Thank you for allowing us to participate in the care of this patient. Please call with questions.   Minus MD Sher.    4/3/2020 11:44 AM

## 2020-04-03 NOTE — PROGRESS NOTES
Ventilator Bronchodilator assessment    Breath sounds:diminished ronchi  Inspiratory Pressure: 24  Plateau Pressure: 19    Patient assessed at level 3          []    Bronchodilator Assessment    BRONCHODILATOR ASSESSMENT SCORE  Score 0 (Home) 1 2 3 4   Breath Sounds   []  Chronic Ventilator: Patient at baseline []  Mild Wheezes/ Clear []  Intermittent wheezes with good air entry [x]  Bilateral/unilateral wheezing with diminished air entry []  Insp/Exp wheeze and/or poor aeration   Ventilator Pressures   []  Chronic Ventilator []  Insp. Pressure less than 25 cm H20 [x]  Insp. Pressure less than 25 cm H20 []  Insp. Pressure exceeds 25 cm H20 []  Insp.  Pressure exceeds 30 cm H20   Plateau Pressure []  NA   []  Plateau Pressure less than 4  []  Plateau Pressure less than or equal to 5 [x]  Plateau Pressure greater than or equal to 6 []  Plateau Pressure greater than or equal to [address]  8:46 AM

## 2020-04-03 NOTE — PROGRESS NOTES
Centrilobular emphysema (HCC) [J43.2]     Atelectasis [J98.11]     Pleural effusion [J90]     Esophageal dilatation [K22.8]        76 y.o. female s/p hiatal hernia repair with syeda fundoplication on 4/67/9018  -s/p trach/peg 3/27/2020  -s/p trach exchange 4/2/2020    Plan:  1. Continue TPN  2. OK to trickle tube feeds this AM  3. Carafate 4 times a day, Protonix BID  4. Continue aggressive bowel regimen  5. Continue reglan  6.  Medical management per primary team    Electronically signed by Bren Gomez MD  on 4/3/2020 at 6:39 AM

## 2020-04-03 NOTE — FLOWSHEET NOTE
DATE: 4/3/2020  NAME: Bismark Montes  MRN: [id number]   : 1945    Discharge Recommendations: Continue to Assess (pending progress)     Subjective: RN agreeable to ROM  Pain: LIZZY, no facial grimacing  Patient follows: No Commands  Is patient on ventilator: Yes  Is patient on sedation: Yes  Precautions: trach    Therapeutic exercises:  UE/LE(s)  Bilateral Passive range of motion all planes x 10 reps  bilateral gastrocnemius stretching 2 reps x 30 seconds    Goals  Short Term Goals  Short term goal 1: Pt to ambulate 100ft supervision with RW  Short term goal 2: Pt to sit <> stand transfer supervision  Short term goal 3: Pt to tolerate 30 minutes of therapy for endurance  Short term goal 4: Pt to demonstrate supervision bed mobility  Short term goal 5: Pt to demonstrate good to good - standing balance to decrease risk of falls    Plan: Progress functional mobility as medically appropriate.    Time In: 918 am  Time Out: 931 am  Time Coded Minutes (treatment minutes): 13  Rehab Potential: guarded  Treatments/week: 2-3x    [address], PTA

## 2020-04-04 LAB
ALLEN TEST: POSITIVE
FIO2: 40
GLUCOSE BLD-MCNC: 111 MG/DL (ref 65–105)
GLUCOSE BLD-MCNC: 124 MG/DL (ref 65–105)
GLUCOSE BLD-MCNC: 137 MG/DL (ref 65–105)
GLUCOSE BLD-MCNC: 142 MG/DL (ref 65–105)
GLUCOSE BLD-MCNC: 147 MG/DL (ref 65–105)
MODE: ABNORMAL
NEGATIVE BASE EXCESS, ART: ABNORMAL (ref 0–2)
O2 DEVICE/FLOW/%: ABNORMAL
PATIENT TEMP: ABNORMAL
POC HCO3: 32.5 MMOL/L (ref 21–28)
POC O2 SATURATION: 98 % (ref 94–98)
POC PCO2 TEMP: ABNORMAL MM HG
POC PCO2: 52.8 MM HG (ref 35–48)
POC PH TEMP: ABNORMAL
POC PH: 7.4 (ref 7.35–7.45)
POC PO2 TEMP: ABNORMAL MM HG
POC PO2: 103.7 MM HG (ref 83–108)
POSITIVE BASE EXCESS, ART: 6 (ref 0–3)
SAMPLE SITE: ABNORMAL
TCO2 (CALC), ART: 34 MMOL/L (ref 22–29)

## 2020-04-04 PROCEDURE — 82803 BLOOD GASES ANY COMBINATION: CPT

## 2020-04-04 PROCEDURE — 6370000000 HC RX 637 (ALT 250 FOR IP): Performed by: STUDENT IN AN ORGANIZED HEALTH CARE EDUCATION/TRAINING PROGRAM

## 2020-04-04 PROCEDURE — 2000000000 HC ICU R&B

## 2020-04-04 PROCEDURE — 2580000003 HC RX 258: Performed by: STUDENT IN AN ORGANIZED HEALTH CARE EDUCATION/TRAINING PROGRAM

## 2020-04-04 PROCEDURE — 94770 HC ETCO2 MONITOR DAILY: CPT

## 2020-04-04 PROCEDURE — 94003 VENT MGMT INPAT SUBQ DAY: CPT

## 2020-04-04 PROCEDURE — 36600 WITHDRAWAL OF ARTERIAL BLOOD: CPT

## 2020-04-04 PROCEDURE — 82947 ASSAY GLUCOSE BLOOD QUANT: CPT

## 2020-04-04 PROCEDURE — 99232 SBSQ HOSP IP/OBS MODERATE 35: CPT | Performed by: NURSE PRACTITIONER

## 2020-04-04 PROCEDURE — 6360000002 HC RX W HCPCS: Performed by: STUDENT IN AN ORGANIZED HEALTH CARE EDUCATION/TRAINING PROGRAM

## 2020-04-04 PROCEDURE — 94640 AIRWAY INHALATION TREATMENT: CPT

## 2020-04-04 PROCEDURE — 2700000000 HC OXYGEN THERAPY PER DAY

## 2020-04-04 PROCEDURE — 2500000003 HC RX 250 WO HCPCS: Performed by: STUDENT IN AN ORGANIZED HEALTH CARE EDUCATION/TRAINING PROGRAM

## 2020-04-04 PROCEDURE — C9113 INJ PANTOPRAZOLE SODIUM, VIA: HCPCS | Performed by: STUDENT IN AN ORGANIZED HEALTH CARE EDUCATION/TRAINING PROGRAM

## 2020-04-04 PROCEDURE — 99291 CRITICAL CARE FIRST HOUR: CPT | Performed by: INTERNAL MEDICINE

## 2020-04-04 PROCEDURE — 94761 N-INVAS EAR/PLS OXIMETRY MLT: CPT

## 2020-04-04 RX ADMIN — BUSPIRONE HYDROCHLORIDE 20 MG: 10 TABLET ORAL at 21:20

## 2020-04-04 RX ADMIN — INSULIN LISPRO 1 UNITS: 100 INJECTION, SOLUTION INTRAVENOUS; SUBCUTANEOUS at 13:23

## 2020-04-04 RX ADMIN — HEPARIN SODIUM 5000 UNITS: 5000 INJECTION INTRAVENOUS; SUBCUTANEOUS at 06:21

## 2020-04-04 RX ADMIN — DEXMEDETOMIDINE HYDROCHLORIDE 0.8 MCG/KG/HR: 100 INJECTION, SOLUTION INTRAVENOUS at 22:40

## 2020-04-04 RX ADMIN — SUCRALFATE 1 G: 1 TABLET ORAL at 18:16

## 2020-04-04 RX ADMIN — BUSPIRONE HYDROCHLORIDE 20 MG: 10 TABLET ORAL at 09:03

## 2020-04-04 RX ADMIN — I.V. FAT EMULSION 100 ML: 20 EMULSION INTRAVENOUS at 18:17

## 2020-04-04 RX ADMIN — GABAPENTIN 300 MG: 250 SOLUTION ORAL at 06:21

## 2020-04-04 RX ADMIN — METOCLOPRAMIDE 10 MG: 5 INJECTION, SOLUTION INTRAMUSCULAR; INTRAVENOUS at 00:33

## 2020-04-04 RX ADMIN — CLONAZEPAM 0.5 MG: 0.5 TABLET ORAL at 09:08

## 2020-04-04 RX ADMIN — SUCRALFATE 1 G: 1 TABLET ORAL at 13:05

## 2020-04-04 RX ADMIN — ONDANSETRON 4 MG: 2 INJECTION INTRAMUSCULAR; INTRAVENOUS at 00:54

## 2020-04-04 RX ADMIN — AMITRIPTYLINE HYDROCHLORIDE 25 MG: 25 TABLET, FILM COATED ORAL at 09:03

## 2020-04-04 RX ADMIN — PANTOPRAZOLE SODIUM 40 MG: 40 INJECTION, POWDER, FOR SOLUTION INTRAVENOUS at 21:20

## 2020-04-04 RX ADMIN — Medication 10 ML: at 09:11

## 2020-04-04 RX ADMIN — ANTI-FUNGAL POWDER MICONAZOLE NITRATE TALC FREE: 1.42 POWDER TOPICAL at 09:15

## 2020-04-04 RX ADMIN — TRAZODONE HYDROCHLORIDE 100 MG: 100 TABLET ORAL at 21:21

## 2020-04-04 RX ADMIN — BUSPIRONE HYDROCHLORIDE 20 MG: 10 TABLET ORAL at 13:05

## 2020-04-04 RX ADMIN — DOCUSATE SODIUM 100 MG: 50 LIQUID ORAL at 21:19

## 2020-04-04 RX ADMIN — AMITRIPTYLINE HYDROCHLORIDE 25 MG: 25 TABLET, FILM COATED ORAL at 13:05

## 2020-04-04 RX ADMIN — PANTOPRAZOLE SODIUM 40 MG: 40 INJECTION, POWDER, FOR SOLUTION INTRAVENOUS at 09:03

## 2020-04-04 RX ADMIN — METOCLOPRAMIDE 10 MG: 5 INJECTION, SOLUTION INTRAMUSCULAR; INTRAVENOUS at 13:05

## 2020-04-04 RX ADMIN — HEPARIN SODIUM 5000 UNITS: 5000 INJECTION INTRAVENOUS; SUBCUTANEOUS at 13:06

## 2020-04-04 RX ADMIN — IPRATROPIUM BROMIDE AND ALBUTEROL SULFATE 1 AMPULE: .5; 3 SOLUTION RESPIRATORY (INHALATION) at 07:35

## 2020-04-04 RX ADMIN — ESCITALOPRAM OXALATE 20 MG: 10 TABLET ORAL at 09:03

## 2020-04-04 RX ADMIN — IPRATROPIUM BROMIDE AND ALBUTEROL SULFATE 1 AMPULE: .5; 3 SOLUTION RESPIRATORY (INHALATION) at 19:50

## 2020-04-04 RX ADMIN — IPRATROPIUM BROMIDE AND ALBUTEROL SULFATE 1 AMPULE: .5; 3 SOLUTION RESPIRATORY (INHALATION) at 11:37

## 2020-04-04 RX ADMIN — METOCLOPRAMIDE 10 MG: 5 INJECTION, SOLUTION INTRAMUSCULAR; INTRAVENOUS at 18:16

## 2020-04-04 RX ADMIN — FUROSEMIDE 20 MG: 10 INJECTION, SOLUTION INTRAMUSCULAR; INTRAVENOUS at 09:04

## 2020-04-04 RX ADMIN — METOCLOPRAMIDE 10 MG: 5 INJECTION, SOLUTION INTRAMUSCULAR; INTRAVENOUS at 06:21

## 2020-04-04 RX ADMIN — HEPARIN SODIUM 5000 UNITS: 5000 INJECTION INTRAVENOUS; SUBCUTANEOUS at 21:20

## 2020-04-04 RX ADMIN — ANTI-FUNGAL POWDER MICONAZOLE NITRATE TALC FREE: 1.42 POWDER TOPICAL at 21:22

## 2020-04-04 RX ADMIN — SUCRALFATE 1 G: 1 TABLET ORAL at 00:54

## 2020-04-04 RX ADMIN — ALBUTEROL SULFATE 2.5 MG: 2.5 SOLUTION RESPIRATORY (INHALATION) at 23:25

## 2020-04-04 RX ADMIN — CLONAZEPAM 0.5 MG: 0.5 TABLET ORAL at 21:20

## 2020-04-04 RX ADMIN — ALBUTEROL SULFATE 2.5 MG: 2.5 SOLUTION RESPIRATORY (INHALATION) at 00:36

## 2020-04-04 RX ADMIN — GABAPENTIN 300 MG: 250 SOLUTION ORAL at 13:05

## 2020-04-04 RX ADMIN — AMITRIPTYLINE HYDROCHLORIDE 25 MG: 25 TABLET, FILM COATED ORAL at 21:20

## 2020-04-04 RX ADMIN — DEXMEDETOMIDINE HYDROCHLORIDE 0.4 MCG/KG/HR: 100 INJECTION, SOLUTION INTRAVENOUS at 09:55

## 2020-04-04 RX ADMIN — QUETIAPINE FUMARATE 100 MG: 100 TABLET ORAL at 21:21

## 2020-04-04 RX ADMIN — DOCUSATE SODIUM 100 MG: 50 LIQUID ORAL at 09:03

## 2020-04-04 RX ADMIN — GABAPENTIN 300 MG: 250 SOLUTION ORAL at 21:22

## 2020-04-04 RX ADMIN — IPRATROPIUM BROMIDE AND ALBUTEROL SULFATE 1 AMPULE: .5; 3 SOLUTION RESPIRATORY (INHALATION) at 15:40

## 2020-04-04 RX ADMIN — MINERAL OIL 45 ML: 1000 SOLUTION ORAL at 09:03

## 2020-04-04 RX ADMIN — POTASSIUM CHLORIDE: 2 INJECTION, SOLUTION, CONCENTRATE INTRAVENOUS at 18:17

## 2020-04-04 RX ADMIN — SUCRALFATE 1 G: 1 TABLET ORAL at 06:21

## 2020-04-04 RX ADMIN — ALBUTEROL SULFATE 2.5 MG: 2.5 SOLUTION RESPIRATORY (INHALATION) at 03:05

## 2020-04-04 ASSESSMENT — PULMONARY FUNCTION TESTS
PIF_VALUE: 24
PIF_VALUE: 17
PIF_VALUE: 24
PIF_VALUE: 10
PIF_VALUE: 16
PIF_VALUE: 21
PIF_VALUE: 23
PIF_VALUE: 23

## 2020-04-04 NOTE — PLAN OF CARE
Problem: OXYGENATION/RESPIRATORY FUNCTION  Goal: Patient will maintain patent airway  4/4/2020 1958 by Cassie Hart RCP  Outcome: Ongoing     Problem: OXYGENATION/RESPIRATORY FUNCTION  Goal: Patient will achieve/maintain normal respiratory rate/effort  Description: Respiratory rate and effort will be within normal limits for the patient  4/4/2020 1958 by Cassie Hart RCP  Outcome: Ongoing     Problem: MECHANICAL VENTILATION  Goal: Patient will maintain patent airway  4/4/2020 1958 by Cassie Hart RCP  Outcome: Ongoing     Problem: MECHANICAL VENTILATION  Goal: Oral health is maintained or improved  4/4/2020 1958 by Cassie Hart RCP  Outcome: Ongoing     Problem: MECHANICAL VENTILATION  Goal: Ability to express needs and understand communication  4/4/2020 1958 by Cassie Hart RCP  Outcome: Ongoing     Problem: MECHANICAL VENTILATION  Goal: Mobility/activity is maintained at optimum level for patient  4/4/2020 1958 by Cassie Hart RCP  Outcome: Ongoing     Problem: MECHANICAL VENTILATION  Goal: Tracheostomy will be managed safely  4/4/2020 1958 by Cassie Hart RCP  Outcome: Ongoing     Problem: SKIN INTEGRITY  Goal: Skin integrity is maintained or improved  4/4/2020 1958 by Cassie Hart RCP  Outcome: Ongoing

## 2020-04-04 NOTE — PROGRESS NOTES
INTENSIVE CARE UNIT  Resident Physician Progress Note    Patient - iTarra Almazan  Date of Admission -  2020 10:37 AM  Date of Evaluation -  2020  Room and Bed Number -  0107/0107-01   Hospital Day - 35  Reason for ICU admission: Acute respiratory failure  CODE STATUS: Full code    SUBJECTIVE:     OVERNIGHT EVENTS:    Reported to have vomited when tube feeds started yesterday. Tube feeds subsequently held. Patient had an episode of hypotension this morning. Resolved with IV fluid. TODAY: She denies any chest pain but nods in information when questioned about abdominal pain. Remains on Precedex was requiring only small infusion rate. Remains on TPN. Making good amounts of urine-about 2.8 L in last 24 hours. Sugars stable.     AWAKE & FOLLOWING COMMANDS:  [] No   [x] Yes    SECRETIONS Amount:  [x] Small [] Moderate  [] Large  [] None  Color:     [x] White [] Colored  [] Bloody    SEDATION:  RAAS Score:  [x]Precedex [] Versed gtt  [] Ativan gtt   [] No Sedation    PARALYZED:  [x] No    [] Yes    VASOPRESSORS:  [x] No    [] Yes  [] Levophed [] Dopamine [] Vasopressin  [] Dobutamine [] Phenylephrine [] Epinephrine      OBJECTIVE:     VITAL SIGNS:  /73   Pulse 88   Temp 99 °F (37.2 °C) (Core)   Resp 23   Ht 5' 2\" (1.575 m)   Wt 188 lb 4.4 oz (85.4 kg)   SpO2 97%   BMI 34.44 kg/m²   Tmax over 24 hours:  Temp (24hrs), Av.1 °F (37.3 °C), Min:98.8 °F (37.1 °C), Max:99.9 °F (37.7 °C)      Patient Vitals for the past 8 hrs:   BP Temp Temp src Pulse Resp SpO2 Weight   20 1137 -- -- -- 88 23 97 % --   20 1100 131/73 -- -- 90 25 97 % --   20 0900 (!) 113/56 -- -- 77 25 98 % --   20 0800 (!) 108/55 99 °F (37.2 °C) CORE 80 22 97 % --   20 0735 -- -- -- 82 23 97 % --   20 0700 (!) 159/71 -- -- 95 (!) 32 97 % --   20 0600 138/72 -- -- 88 23 99 % --   20 0500 (!) 95/46 98.8 °F (37.1 °C) Bladder 69 28 99 % 188 lb 4.4 oz (85.4 kg)   20 3904 -- -- -- -- 29 99 % --   04/04/20 0400 (!) 87/48 -- -- 77 28 98 % --         Intake/Output Summary (Last 24 hours) at 4/4/2020 1149  Last data filed at 4/4/2020 1100  Gross per 24 hour   Intake 2158 ml   Output 3025 ml   Net -867 ml     Date 04/04/20 0000 - 04/04/20 2359   Shift 7308-3591 9673-6478 5220-0372 24 Hour Total   INTAKE   I.V.(mL/kg) 120(1.4) 35(0.4)  155(1.8)   NG/GT(mL/kg) 152(1.8) 50(0.6)  202(2.4)   TPN(mL/kg) 775(9.1) 246(2.9)  1021(12)   Shift Total(mL/kg) 1047(12.3) 331(3.9)  1378(16.1)   OUTPUT   Urine(mL/kg/hr) 745(1.1) 980  1725   Shift Total(mL/kg) 745(8.7) 980(11.5)  1725(20.2)   Weight (kg) 85.4 85.4 85.4 85.4     Wt Readings from Last 3 Encounters:   04/04/20 188 lb 4.4 oz (85.4 kg)   02/29/20 186 lb 1.1 oz (84.4 kg)   02/24/20 183 lb 6.4 oz (83.2 kg)     Body mass index is 34.44 kg/m². PHYSICAL EXAM:  GEN:   mechanically ventilated via tracheostomy tube, mildly sedated with Precedex  EYES:  pupils equal, round, and reactive to light  HEENT:  Neck supple, no obvious bleeding around tracheostomy site  LUNGS:  Ventilated breath sounds  CV:    normal apical impulse, regular rate and rhythm and normal S1 and S2  ABDOMEN:   PEG tube in situ, normal bowel sounds, soft and non-distended  NEURO:   Obeys commands, most extremities albeit weakly.   SKIN:   Normal skin color, texture, turgor  EXTREMITIES:  No pedal or leg edema, no calf tenderness/swelling, no erythema, distal pulses intact       MEDICATIONS:  Scheduled Meds:   sodium chloride  500 mL Intravenous Once    QUEtiapine  100 mg Oral Nightly    miconazole   Topical BID    amitriptyline  25 mg Oral TID    docusate  100 mg Oral BID    albuterol  2.5 mg Nebulization BID    mineral oil  45 mL Per G Tube Daily    polyethylene glycol  17 g Per G Tube Daily    heparin (porcine)  5,000 Units Subcutaneous 3 times per day    furosemide  20 mg Intravenous Daily    ipratropium-albuterol  1 ampule Inhalation 4x daily    labetalol  10 mg $Subsequent Day  Ventilator Started: Yes  Ventilator Stopped: Yes  Skin Assessment: Clean, dry, & intact  Equipment ID: [id number]  Equipment Changed: HME  Vent Type: Servo i  Vent Mode: PRVC  Vt Ordered: 400 mL  Pressure Ordered: 0  Rate Set: 26 bmp  Pressure Support: 8 cmH20  FiO2 : 40 %  Sensitivity: 1  PEEP/CPAP: 5  I Time/ I Time %: 0.7 s  Cuff Pressure (cm H2O): 20 cm H2O  Humidification Source: HME  Humidification Temp: 31  Humidification Temp Measured: 31  Circuit Condensation: Drained  Nitric Oxide/Epoprostenol In Use?: No  Additional Respiratory  Assessments  Pulse: 88  Resp: 23  SpO2: 97 %  End Tidal CO2: 38 (%)  pCO2 (TCOM, mmHg): 36 mmHg  Position: Semi-[place]'s  Humidification Source: HME  Humidification Temp: 31  Circuit Condensation: Drained  Oral Care Completed?: Yes  Oral Care: Mouthwash, Lip moisturizer applied, Mouth swabbed, Mouth suctioned  Subglottic Suction Done?: No  Cuff Pressure (cm H2O): 20 cm H2O  Skin barrier applied: No    ABGs:   Arterial Blood Gas result:  pH 7.395; pCO2 52.8; pO2 103.7;  %O2 Sat 97%.   Lab Results   Component Value Date    CTI7OBD 34 04/04/2020    FIO2 40.0 04/04/2020         DATA:  Complete Blood Count:   Recent Labs     04/01/20  1857 04/02/20  0510 04/03/20  0458   WBC  --  10.6 11.3   RBC  --  3.03* 3.19*   HGB 8.6* 8.9* 9.6*   HCT 28.2* 28.9* 30.9*   MCV  --  95.4 96.9   MCH  --  29.4 30.1   MCHC  --  30.8 31.1   RDW  --  16.5* 16.4*   PLT  --  385 439   MPV  --  10.8 10.7        Last 3 Blood Glucose:   Recent Labs     04/02/20  0510 04/03/20  0459   GLUCOSE 128* 123*        PT/INR:    Lab Results   Component Value Date    PROTIME 10.0 03/27/2020    INR 1.0 03/27/2020     PTT:    Lab Results   Component Value Date    APTT 26.2 03/27/2020       Comprehensive Metabolic Profile:   Recent Labs     04/02/20  0510 04/03/20  0459    142   K 3.8 4.0    103   CO2 28 27   BUN 27* 26*   CREATININE 0.51 0.52   GLUCOSE 128* 123*   CALCIUM 9.3 9.4   PROT 6.3*  --

## 2020-04-04 NOTE — PLAN OF CARE
of:  Goal: Will remain free from falls  Description: Will remain free from falls  Outcome: Ongoing  Goal: Absence of physical injury  Description: Absence of physical injury  Outcome: Ongoing     Problem: OXYGENATION/RESPIRATORY FUNCTION  Goal: Patient will maintain patent airway  4/4/2020 1341 by Gustabo Harkins RN  Outcome: Ongoing  4/4/2020 0851 by Olegario Constantino RCP  Outcome: Ongoing  Goal: Patient will achieve/maintain normal respiratory rate/effort  Description: Respiratory rate and effort will be within normal limits for the patient  4/4/2020 1341 by Gustabo Harkins RN  Outcome: Ongoing  4/4/2020 0851 by Olegario Constantino RCP  Outcome: Ongoing     Problem: MECHANICAL VENTILATION  Goal: Patient will maintain patent airway  4/4/2020 1341 by Gustabo Harkins RN  Outcome: Ongoing  4/4/2020 0851 by Olegario Constantino RCP  Outcome: Ongoing  Goal: Oral health is maintained or improved  4/4/2020 1341 by Gustabo Harkins RN  Outcome: Ongoing  4/4/2020 0851 by Olegario Constantino RCP  Outcome: Ongoing  Goal: Ability to express needs and understand communication  4/4/2020 1341 by Gustabo Harkins RN  Outcome: Ongoing  4/4/2020 0851 by Olegario Constantino RCP  Outcome: Ongoing  Goal: Mobility/activity is maintained at optimum level for patient  4/4/2020 1341 by Gustabo Harkins RN  Outcome: Ongoing  4/4/2020 0851 by Olegario Constantino RCP  Outcome: Ongoing  Goal: Tracheostomy will be managed safely  4/4/2020 1341 by Gustabo Harkins RN  Outcome: Ongoing  4/4/2020 0851 by Olegario Constantino RCP  Outcome: Ongoing     Problem: SKIN INTEGRITY  Goal: Skin integrity is maintained or improved  4/4/2020 1341 by Gustabo Harkins RN  Outcome: Ongoing  4/4/2020 0851 by Olegario Constantino RCP  Outcome: Ongoing     Problem: Nutrition  Goal: Optimal nutrition therapy  Description: Nutrition Problem: Inadequate oral intake  Intervention: Food and/or Nutrient Delivery: Continue Parenteral Nutrition  Nutritional Goals: meet % of estimated nutrition needs    Outcome: Ongoing     Problem: Musculor/Skeletal Functional Status  Goal: Highest potential functional level  Outcome: Ongoing

## 2020-04-04 NOTE — PROGRESS NOTES
Centrilobular emphysema (HCC) [J43.2]     Atelectasis [J98.11]     Pleural effusion [J90]     Esophageal dilatation [K22.8]        76 y.o. female s/p hiatal hernia repair with syeda fundoplication on 3/21/7851  -s/p trach/peg 3/27/2020  -s/p trach exchange 4/2/2020    Plan:  1. Hole tube feeds. Keep G-tube to gravity  2. Serial abdominal exams  3. Continue TPN  4. Carafate 4 times a day, Protonix BID  5. Continue aggressive bowel regimen  6. Continue reglan  7.  Medical management per primary team    Electronically signed by Johanna Herndon DO  on 4/4/2020 at 7:06 AM

## 2020-04-04 NOTE — PLAN OF CARE
Ventilator Bronchodilator assessment    Breath sounds: Clear/Diminished  Inspiratory Pressure: 24  Plateau Pressure: 14    Patient assessed at level 3; keeping pt on Q4 treatments          [x]    Bronchodilator Assessment    BRONCHODILATOR ASSESSMENT SCORE  Score 0 (Home) 1 2 3 4   Breath Sounds   []  Chronic Ventilator: Patient at baseline []  Mild Wheezes/ Clear []  Intermittent wheezes with good air entry [x]  Bilateral/unilateral wheezing with diminished air entry []  Insp/Exp wheeze and/or poor aeration   Ventilator Pressures   []  Chronic Ventilator [x]  Insp. Pressure less than 25 cm H20 [x]  Insp. Pressure less than 25 cm H20 []  Insp. Pressure exceeds 25 cm H20 []  Insp.  Pressure exceeds 30 cm H20   Plateau Pressure []  NA   []  Plateau Pressure less than 4  []  Plateau Pressure less than or equal to 5 []  Plateau Pressure greater than or equal to 6 [x]  Plateau Pressure greater than or equal to Sophia Troncoso [id number]  8:52 AM

## 2020-04-04 NOTE — PROGRESS NOTES
[facility]  Progress Note    Sharon Olivo  MRN: [id number]  Date: 4/4/2020    Reason for F/U :   Acute respiratory failure    Impression :   1. Acute respiratory failure status post intubation 3/17, trach placement 3/27, Replacement in OR 4/2/2020  2. Status post PEG tube placement 327  3. Peptic ulcer disease  4. Status post hiatal hernia repair and Nissen semi-fundoplication 6/56/0385  5. Pulmonary edema  6. Pleural effusions  7. Acute kidney injury    Recommendations:   · Continue to monitor patient off antibiotics  · Meropenem discontinued 3/31/2020  · Diflucan discontinued 3/26/2020  · Continue supportive care    Infection Control Recommendations:   Standard precautions    Discharge Planning:   Estimated Length of IV antimicrobials: Stop 3/31/2020  Patient will need Midline Catheter Insertion/ PICC line Insertion: No  Patient will need: Home IV , [place],  SNF,  LTAC: Undetermined  Patient willneed outpatient wound care: No    MedicalDecision making / Summary of Stay:   Sharon Olivo is a 76y.o.-year-old  female who was initially admitted on 2/29/2020. Patient seen at the request of Dr. Gerard Ward:     Patient has a history of severe GERD despite being on PPI, H2 blocker and Tums. She underwent laparoscopic robotic hiatal hernia repair and fundoplication last month on 02/24/2020 and postoperatively she developed hypoxia. CT chest done on 02/26/2020 showed bibasilar atelectasis/consolidation and patchy area of infiltrate in the right upper lobe. Patient was discharged on home oxygen on 2-28.      She presented back to [facility] ER on 2-29 with worsening shortness of breath, dyspnea as well as an episode of dark red emesis. Her SaO2 was 60 % in the ED. There was concern of aspiration. CTA chest done was negative for PE, showed bilateral pleural effusions and severe esophageal dilatation, surgery was consulted.   Patient was transferred to [facility] for further management.     Patient was started on noninvasive ventilation and then intubated and admitted to the ICU. She improved, was extubated on 3-7 to high flow O2. Pt was transferred out of the ICU on 3-13.      On 3-14 pts hgb dropped to 6.3 and she developed coffee-ground then bright red emesis, and was again transferred back to medical ICU.     General surgery performed endoscopy on 3/17 which showed an ulcer at the GE junction and mild gastritis with no active bleeding. After the procedure she developed acute respiratory distress and a fever of 39.8. She was emergently intubated and started on Meropenem and Vancomycin.      ID is consulted for antibiotic management. Patient was continued on meropenem. Vancomycin D/C     S/P trach and PEG placement 3/27     Flat plate of abdomen 3-13-10 with nonobstructive gas pattern with retained enteric contrast in the colon  On TPN. Patient could not tolerate tube feeds. Has superficial venous clot Rt  Mid forearm to antecubital      Tracheal exchange in OR done on 20. Current evaluation:2020    Patient seen and examined in the ICU  T-max in 24 hours 99.1  Patient is sitting up in bed, shakes her head yes to every question, unable to perform accurate review of systems. No acute distress. BP (!) 143/58   Pulse 83   Temp 99.1 °F (37.3 °C) (Core)   Resp 24   Ht 5' 2\" (1.575 m)   Wt 188 lb 4.4 oz (85.4 kg)   SpO2 99%   BMI 34.44 kg/m²     Temperature Range: Temp: 99.1 °F (37.3 °C) Temp  Av °F (37.2 °C)  Min: 98.8 °F (37.1 °C)  Max: 99.1 °F (37.3 °C)        Physical Examination :     Physical Exam  Constitutional:       General: She is not in acute distress. HENT:      Head: Normocephalic and atraumatic. Neck:      Comments: Trach in place to vent  Cardiovascular:      Rate and Rhythm: Normal rate and regular rhythm. Pulmonary:      Effort: Pulmonary effort is normal. No respiratory distress. Breath sounds: Normal breath sounds.    Abdominal: General: Abdomen is flat. Palpations: Abdomen is soft. Comments: PEG tube in place. Surgical incisions without surrounding erythema or drainage. Skin:     General: Skin is warm and dry. Neurological:      Mental Status: She is alert. Laboratory data:   I have independently reviewed the followinglabs:  CBC with Differential:   Recent Labs     04/02/20  0510 04/03/20  0458   WBC 10.6 11.3   HGB 8.9* 9.6*   HCT 28.9* 30.9*    439   LYMPHOPCT 22* 20*   MONOPCT 1 4     BMP:   Recent Labs     04/02/20  0510 04/03/20  0459    142   K 3.8 4.0    103   CO2 28 27   BUN 27* 26*   CREATININE 0.51 0.52   MG  --  2.1     Hepatic Function Panel:   Recent Labs     04/02/20  0510   PROT 6.3*   LABALBU 2.0*   BILITOT 0.25*   ALKPHOS 176*   ALT 46*   AST 51*     No results for input(s): VANCOTROUGH in the last 72 hours. No results found for: CRP  No results found for: SEDRATE    No results for input(s): PROCAL in the last 72 hours.     Imaging Studies:     No new imaging  Cultures:     No new culture data  Medications:      fat emulsion  100 mL Intravenous Daily    sodium chloride  500 mL Intravenous Once    QUEtiapine  100 mg Oral Nightly    miconazole   Topical BID    amitriptyline  25 mg Oral TID    docusate  100 mg Oral BID    albuterol  2.5 mg Nebulization BID    mineral oil  45 mL Per G Tube Daily    polyethylene glycol  17 g Per G Tube Daily    heparin (porcine)  5,000 Units Subcutaneous 3 times per day    furosemide  20 mg Intravenous Daily    ipratropium-albuterol  1 ampule Inhalation 4x daily    labetalol  10 mg Intravenous Once    gabapentin  300 mg Oral 3 times per day    insulin lispro  0-6 Units Subcutaneous Q6H    sucralfate  1 g Oral 4 times per day    pantoprazole  40 mg Intravenous BID    And    sodium chloride (PF)  10 mL Intravenous BID    metoclopramide  10 mg Intravenous Q6H    bisacodyl  10 mg Rectal Daily    lidocaine 1 % injection  5 mL

## 2020-04-04 NOTE — PLAN OF CARE
Problem: OXYGENATION/RESPIRATORY FUNCTION  Goal: Patient will maintain patent airway  Outcome: Ongoing     Problem: OXYGENATION/RESPIRATORY FUNCTION  Goal: Patient will achieve/maintain normal respiratory rate/effort  Description: Respiratory rate and effort will be within normal limits for the patient  Outcome: Ongoing     Problem: MECHANICAL VENTILATION  Goal: Patient will maintain patent airway  Outcome: Ongoing     Problem: MECHANICAL VENTILATION  Goal: Oral health is maintained or improved  Outcome: Ongoing     Problem: MECHANICAL VENTILATION  Goal: Ability to express needs and understand communication  Outcome: Ongoing     Problem: MECHANICAL VENTILATION  Goal: Mobility/activity is maintained at optimum level for patient  Outcome: Ongoing     Problem: MECHANICAL VENTILATION  Goal: Tracheostomy will be managed safely  Outcome: Ongoing     Problem: SKIN INTEGRITY  Goal: Skin integrity is maintained or improved  Outcome: Ongoing

## 2020-04-05 LAB
ABSOLUTE EOS #: 0.1 K/UL (ref 0–0.4)
ABSOLUTE IMMATURE GRANULOCYTE: 0.59 K/UL (ref 0–0.3)
ABSOLUTE LYMPH #: 1.37 K/UL (ref 1–4.8)
ABSOLUTE MONO #: 0.29 K/UL (ref 0.1–0.8)
BASOPHILS # BLD: 0 % (ref 0–2)
BASOPHILS ABSOLUTE: 0 K/UL (ref 0–0.2)
DIFFERENTIAL TYPE: ABNORMAL
EOSINOPHILS RELATIVE PERCENT: 1 % (ref 1–4)
GLUCOSE BLD-MCNC: 100 MG/DL (ref 65–105)
GLUCOSE BLD-MCNC: 113 MG/DL (ref 65–105)
GLUCOSE BLD-MCNC: 115 MG/DL (ref 65–105)
HCT VFR BLD CALC: 35 % (ref 36.3–47.1)
HEMOGLOBIN: 10 G/DL (ref 11.9–15.1)
IMMATURE GRANULOCYTES: 6 %
LYMPHOCYTES # BLD: 14 % (ref 24–44)
MCH RBC QN AUTO: 28.8 PG (ref 25.2–33.5)
MCHC RBC AUTO-ENTMCNC: 28.6 G/DL (ref 28.4–34.8)
MCV RBC AUTO: 100.9 FL (ref 82.6–102.9)
MONOCYTES # BLD: 3 % (ref 1–7)
MORPHOLOGY: ABNORMAL
NRBC AUTOMATED: 0 PER 100 WBC
PDW BLD-RTO: 16.5 % (ref 11.8–14.4)
PLATELET # BLD: 424 K/UL (ref 138–453)
PLATELET ESTIMATE: ABNORMAL
PMV BLD AUTO: 10.8 FL (ref 8.1–13.5)
RBC # BLD: 3.47 M/UL (ref 3.95–5.11)
RBC # BLD: ABNORMAL 10*6/UL
SEG NEUTROPHILS: 76 % (ref 36–66)
SEGMENTED NEUTROPHILS ABSOLUTE COUNT: 7.45 K/UL (ref 1.8–7.7)
WBC # BLD: 9.8 K/UL (ref 3.5–11.3)
WBC # BLD: ABNORMAL 10*3/UL

## 2020-04-05 PROCEDURE — 6360000002 HC RX W HCPCS: Performed by: STUDENT IN AN ORGANIZED HEALTH CARE EDUCATION/TRAINING PROGRAM

## 2020-04-05 PROCEDURE — 94770 HC ETCO2 MONITOR DAILY: CPT

## 2020-04-05 PROCEDURE — 6370000000 HC RX 637 (ALT 250 FOR IP): Performed by: STUDENT IN AN ORGANIZED HEALTH CARE EDUCATION/TRAINING PROGRAM

## 2020-04-05 PROCEDURE — 2000000000 HC ICU R&B

## 2020-04-05 PROCEDURE — 2700000000 HC OXYGEN THERAPY PER DAY

## 2020-04-05 PROCEDURE — 99291 CRITICAL CARE FIRST HOUR: CPT | Performed by: INTERNAL MEDICINE

## 2020-04-05 PROCEDURE — 82947 ASSAY GLUCOSE BLOOD QUANT: CPT

## 2020-04-05 PROCEDURE — 2580000003 HC RX 258: Performed by: STUDENT IN AN ORGANIZED HEALTH CARE EDUCATION/TRAINING PROGRAM

## 2020-04-05 PROCEDURE — C9113 INJ PANTOPRAZOLE SODIUM, VIA: HCPCS | Performed by: STUDENT IN AN ORGANIZED HEALTH CARE EDUCATION/TRAINING PROGRAM

## 2020-04-05 PROCEDURE — 94640 AIRWAY INHALATION TREATMENT: CPT

## 2020-04-05 PROCEDURE — 94003 VENT MGMT INPAT SUBQ DAY: CPT

## 2020-04-05 PROCEDURE — 99233 SBSQ HOSP IP/OBS HIGH 50: CPT | Performed by: INTERNAL MEDICINE

## 2020-04-05 PROCEDURE — 2500000003 HC RX 250 WO HCPCS: Performed by: STUDENT IN AN ORGANIZED HEALTH CARE EDUCATION/TRAINING PROGRAM

## 2020-04-05 PROCEDURE — 85025 COMPLETE CBC W/AUTO DIFF WBC: CPT

## 2020-04-05 PROCEDURE — 94761 N-INVAS EAR/PLS OXIMETRY MLT: CPT

## 2020-04-05 PROCEDURE — 36415 COLL VENOUS BLD VENIPUNCTURE: CPT

## 2020-04-05 RX ADMIN — IPRATROPIUM BROMIDE AND ALBUTEROL SULFATE 1 AMPULE: .5; 3 SOLUTION RESPIRATORY (INHALATION) at 15:12

## 2020-04-05 RX ADMIN — CLONAZEPAM 0.5 MG: 0.5 TABLET ORAL at 08:42

## 2020-04-05 RX ADMIN — SODIUM CHLORIDE, PRESERVATIVE FREE 10 ML: 5 INJECTION INTRAVENOUS at 21:29

## 2020-04-05 RX ADMIN — ALBUTEROL SULFATE 2.5 MG: 2.5 SOLUTION RESPIRATORY (INHALATION) at 02:55

## 2020-04-05 RX ADMIN — ESCITALOPRAM OXALATE 20 MG: 10 TABLET ORAL at 07:53

## 2020-04-05 RX ADMIN — Medication 10 ML: at 13:18

## 2020-04-05 RX ADMIN — PANTOPRAZOLE SODIUM 40 MG: 40 INJECTION, POWDER, FOR SOLUTION INTRAVENOUS at 07:53

## 2020-04-05 RX ADMIN — IPRATROPIUM BROMIDE AND ALBUTEROL SULFATE 1 AMPULE: .5; 3 SOLUTION RESPIRATORY (INHALATION) at 19:57

## 2020-04-05 RX ADMIN — INSULIN LISPRO 1 UNITS: 100 INJECTION, SOLUTION INTRAVENOUS; SUBCUTANEOUS at 00:26

## 2020-04-05 RX ADMIN — SUCRALFATE 1 G: 1 TABLET ORAL at 00:23

## 2020-04-05 RX ADMIN — BUSPIRONE HYDROCHLORIDE 20 MG: 10 TABLET ORAL at 13:18

## 2020-04-05 RX ADMIN — DOCUSATE SODIUM 100 MG: 50 LIQUID ORAL at 07:55

## 2020-04-05 RX ADMIN — GABAPENTIN 300 MG: 250 SOLUTION ORAL at 13:18

## 2020-04-05 RX ADMIN — BISACODYL 10 MG: 10 SUPPOSITORY RECTAL at 07:54

## 2020-04-05 RX ADMIN — GABAPENTIN 300 MG: 250 SOLUTION ORAL at 21:26

## 2020-04-05 RX ADMIN — IPRATROPIUM BROMIDE AND ALBUTEROL SULFATE 1 AMPULE: .5; 3 SOLUTION RESPIRATORY (INHALATION) at 07:39

## 2020-04-05 RX ADMIN — HEPARIN SODIUM 5000 UNITS: 5000 INJECTION INTRAVENOUS; SUBCUTANEOUS at 21:26

## 2020-04-05 RX ADMIN — METOCLOPRAMIDE 10 MG: 5 INJECTION, SOLUTION INTRAMUSCULAR; INTRAVENOUS at 17:34

## 2020-04-05 RX ADMIN — GABAPENTIN 300 MG: 250 SOLUTION ORAL at 05:34

## 2020-04-05 RX ADMIN — Medication 10 ML: at 08:43

## 2020-04-05 RX ADMIN — SUCRALFATE 1 G: 1 TABLET ORAL at 17:34

## 2020-04-05 RX ADMIN — FUROSEMIDE 20 MG: 10 INJECTION, SOLUTION INTRAMUSCULAR; INTRAVENOUS at 07:54

## 2020-04-05 RX ADMIN — SUCRALFATE 1 G: 1 TABLET ORAL at 05:34

## 2020-04-05 RX ADMIN — SODIUM CHLORIDE, PRESERVATIVE FREE 10 ML: 5 INJECTION INTRAVENOUS at 08:42

## 2020-04-05 RX ADMIN — ACETAMINOPHEN 650 MG: 325 TABLET ORAL at 13:17

## 2020-04-05 RX ADMIN — HEPARIN SODIUM 5000 UNITS: 5000 INJECTION INTRAVENOUS; SUBCUTANEOUS at 05:34

## 2020-04-05 RX ADMIN — LORAZEPAM 1 MG: 2 INJECTION INTRAMUSCULAR at 05:01

## 2020-04-05 RX ADMIN — HEPARIN SODIUM 5000 UNITS: 5000 INJECTION INTRAVENOUS; SUBCUTANEOUS at 13:17

## 2020-04-05 RX ADMIN — SUCRALFATE 1 G: 1 TABLET ORAL at 12:30

## 2020-04-05 RX ADMIN — ANTI-FUNGAL POWDER MICONAZOLE NITRATE TALC FREE: 1.42 POWDER TOPICAL at 08:43

## 2020-04-05 RX ADMIN — TRAZODONE HYDROCHLORIDE 100 MG: 100 TABLET ORAL at 21:26

## 2020-04-05 RX ADMIN — DOCUSATE SODIUM 100 MG: 50 LIQUID ORAL at 21:26

## 2020-04-05 RX ADMIN — AMITRIPTYLINE HYDROCHLORIDE 25 MG: 25 TABLET, FILM COATED ORAL at 07:52

## 2020-04-05 RX ADMIN — SODIUM CHLORIDE, PRESERVATIVE FREE 10 ML: 5 INJECTION INTRAVENOUS at 08:43

## 2020-04-05 RX ADMIN — AMITRIPTYLINE HYDROCHLORIDE 25 MG: 25 TABLET, FILM COATED ORAL at 13:18

## 2020-04-05 RX ADMIN — LORAZEPAM 1 MG: 2 INJECTION INTRAMUSCULAR at 21:21

## 2020-04-05 RX ADMIN — PANTOPRAZOLE SODIUM 40 MG: 40 INJECTION, POWDER, FOR SOLUTION INTRAVENOUS at 21:26

## 2020-04-05 RX ADMIN — Medication 10 ML: at 07:56

## 2020-04-05 RX ADMIN — BUSPIRONE HYDROCHLORIDE 20 MG: 10 TABLET ORAL at 21:25

## 2020-04-05 RX ADMIN — POTASSIUM CHLORIDE: 2 INJECTION, SOLUTION, CONCENTRATE INTRAVENOUS at 17:35

## 2020-04-05 RX ADMIN — METOCLOPRAMIDE 10 MG: 5 INJECTION, SOLUTION INTRAMUSCULAR; INTRAVENOUS at 05:34

## 2020-04-05 RX ADMIN — CLONAZEPAM 0.5 MG: 0.5 TABLET ORAL at 21:28

## 2020-04-05 RX ADMIN — METOCLOPRAMIDE 10 MG: 5 INJECTION, SOLUTION INTRAMUSCULAR; INTRAVENOUS at 12:30

## 2020-04-05 RX ADMIN — Medication 10 ML: at 21:27

## 2020-04-05 RX ADMIN — I.V. FAT EMULSION 100 ML: 20 EMULSION INTRAVENOUS at 17:34

## 2020-04-05 RX ADMIN — ANTI-FUNGAL POWDER MICONAZOLE NITRATE TALC FREE: 1.42 POWDER TOPICAL at 21:30

## 2020-04-05 RX ADMIN — IPRATROPIUM BROMIDE AND ALBUTEROL SULFATE 1 AMPULE: .5; 3 SOLUTION RESPIRATORY (INHALATION) at 11:12

## 2020-04-05 RX ADMIN — METOCLOPRAMIDE 10 MG: 5 INJECTION, SOLUTION INTRAMUSCULAR; INTRAVENOUS at 00:23

## 2020-04-05 RX ADMIN — QUETIAPINE FUMARATE 100 MG: 100 TABLET ORAL at 21:26

## 2020-04-05 RX ADMIN — MINERAL OIL AND WHITE PETROLATUM: 150; 830 OINTMENT OPHTHALMIC at 07:54

## 2020-04-05 RX ADMIN — BUSPIRONE HYDROCHLORIDE 20 MG: 10 TABLET ORAL at 08:42

## 2020-04-05 RX ADMIN — AMITRIPTYLINE HYDROCHLORIDE 25 MG: 25 TABLET, FILM COATED ORAL at 21:26

## 2020-04-05 RX ADMIN — ALBUTEROL SULFATE 2.5 MG: 2.5 SOLUTION RESPIRATORY (INHALATION) at 23:31

## 2020-04-05 ASSESSMENT — PULMONARY FUNCTION TESTS
PIF_VALUE: 13
PIF_VALUE: 14
PIF_VALUE: 14
PIF_VALUE: 8
PIF_VALUE: 14
PIF_VALUE: 13

## 2020-04-05 ASSESSMENT — PAIN SCALES - GENERAL
PAINLEVEL_OUTOF10: 3
PAINLEVEL_OUTOF10: 0

## 2020-04-05 NOTE — PROGRESS NOTES
atraumatic. Neck:      Comments: Trach in place to vent  Cardiovascular:      Rate and Rhythm: Normal rate and regular rhythm. Pulmonary:      Effort: Pulmonary effort is normal. No respiratory distress. Breath sounds: Normal breath sounds. Abdominal:      General: Abdomen is flat. Palpations: Abdomen is soft. Comments: PEG tube in place. Surgical incisions without surrounding erythema or drainage. Skin:     General: Skin is warm and dry. Neurological:      Mental Status: She is alert. Laboratory data:   I have independently reviewed the followinglabs:  CBC with Differential:   Recent Labs     04/03/20  0458 04/05/20  0430   WBC 11.3 9.8   HGB 9.6* 10.0*   HCT 30.9* 35.0*    424   LYMPHOPCT 20* 14*   MONOPCT 4 3     BMP:   Recent Labs     04/03/20  0459      K 4.0      CO2 27   BUN 26*   CREATININE 0.52   MG 2.1     Hepatic Function Panel:   No results for input(s): PROT, LABALBU, BILIDIR, IBILI, BILITOT, ALKPHOS, ALT, AST in the last 72 hours. No results for input(s): VANCOTROUGH in the last 72 hours. No results found for: CRP  No results found for: SEDRATE    No results for input(s): PROCAL in the last 72 hours.     Imaging Studies:     No new imaging  Cultures:     No new culture data  Medications:      fat emulsion  100 mL Intravenous Daily    sodium chloride  500 mL Intravenous Once    QUEtiapine  100 mg Oral Nightly    miconazole   Topical BID    amitriptyline  25 mg Oral TID    docusate  100 mg Oral BID    albuterol  2.5 mg Nebulization BID    mineral oil  45 mL Per G Tube Daily    polyethylene glycol  17 g Per G Tube Daily    heparin (porcine)  5,000 Units Subcutaneous 3 times per day    furosemide  20 mg Intravenous Daily    ipratropium-albuterol  1 ampule Inhalation 4x daily    labetalol  10 mg Intravenous Once    gabapentin  300 mg Oral 3 times per day    insulin lispro  0-6 Units Subcutaneous Q6H    sucralfate  1 g Oral 4 times per day  pantoprazole  40 mg Intravenous BID    And    sodium chloride (PF)  10 mL Intravenous BID    metoclopramide  10 mg Intravenous Q6H    bisacodyl  10 mg Rectal Daily    lidocaine 1 % injection  5 mL Intradermal Once    sodium chloride flush  10 mL Intravenous 2 times per day    traZODone  100 mg Oral Nightly    magnesium citrate  296 mL Oral Once    clonazePAM  0.5 mg Oral BID    [Held by provider] amLODIPine  10 mg Oral Nightly    sodium chloride flush  10 mL Intravenous 2 times per day    busPIRone  20 mg Oral TID    [Held by provider] metoprolol succinate  25 mg Oral Nightly    sodium chloride (PF)  10 mL Intravenous Daily    escitalopram  20 mg Oral Daily     Medical Decision Making-Other:      Note:  · Labs, medications, radiologic studies were reviewed with personal review of films  · Moderate Large amounts of data were reviewed  · Discussed with nursing Staff, Discharge planner  · Infection Control and Prevention measures reviewed  · All prior entries were reviewed  · Administer medications as ordered  · Prognosis: Guarded  · Discharge planning reviewed  · Follow up as outpatient.     Thank you for allowing us to participate in the care of this patient. Please call with questions. Elvie Boone MD.    ATTESTATION:    I have discussed the case, including pertinent history and exam findings with the residents and students. I have seen and examined the patient and the key elements of the encounter have been performed by me. I was present when the student obtained his information or examined the patient. I have reviewed the laboratory data, other diagnostic studies and discussed them with the residents. I have updated the medical record where necessary. I agree with the assessment, plan and orders as documented by the resident/ student.     Angela Galvin MD.

## 2020-04-05 NOTE — PROGRESS NOTES
General Surgery:  Daily Progress Note                    PATIENT NAME: Hannah Pacheco       TODAY'S DATE: 4/5/2020, 7:04 AM      SUBJECTIVE:     Pt seen and examined at bedside. No acute issues overnight but patient has had issues tolerating tube feeds. Trickle tube feeds resulting in emesis again. Pt continues to have bowel movements/flatus and abdomen remains soft. Afebrile. VSS. OBJECTIVE:   VITALS:  BP (!) 108/40   Pulse 82   Temp 98.8 °F (37.1 °C)   Resp 27   Ht 5' 2\" (1.575 m)   Wt 188 lb 4.4 oz (85.4 kg)   SpO2 99%   BMI 34.44 kg/m²      INTAKE/OUTPUT:      Intake/Output Summary (Last 24 hours) at 4/5/2020 0704  Last data filed at 4/5/2020 0700  Gross per 24 hour   Intake 2185 ml   Output 2780 ml   Net -595 ml       PHYSICAL EXAM:  Constitutional: awake and alert. NAD   HEENT:  Trach in place  Heart: +S1/S2  Lungs: mechanically ventilated w/ tracheostomy - no distress    Abdomen: soft, non-distended, no diffuse peritoneal signs  Extremities: No cyanosis   Skin: Skin color, texture, turgor normal. No rashes or lesions.     Data:  CBC:   Lab Results   Component Value Date    WBC 9.8 04/05/2020    RBC 3.47 04/05/2020    HGB 10.0 04/05/2020    HCT 35.0 04/05/2020    .9 04/05/2020    MCH 28.8 04/05/2020    MCHC 28.6 04/05/2020    RDW 16.5 04/05/2020     04/05/2020    MPV 10.8 04/05/2020     BMP:    Lab Results   Component Value Date     04/03/2020    K 4.0 04/03/2020     04/03/2020    CO2 27 04/03/2020    BUN 26 04/03/2020    LABALBU 2.0 04/02/2020    CREATININE 0.52 04/03/2020    CALCIUM 9.4 04/03/2020    GFRAA >60 04/03/2020    LABGLOM >60 04/03/2020    GLUCOSE 123 04/03/2020         ASSESSMENT:  Active Hospital Problems    Diagnosis Date Noted    Acute postoperative respiratory insufficiency [J95.89]     Critical illness polyneuropathy ([address] [G62.81]     Shock ([facility] [R57.9]     Fever [R50.9]     Upper GI bleeding [K92.2]     Acute on chronic blood loss anemia [D62]     Aspiration pneumonia ([facility] [address] [J69.0] 02/29/2020    Centrilobular emphysema ([facility] [address] [J43.2]     Atelectasis [J98.11]     Pleural effusion [J90]     Esophageal dilatation [K22.8]        76 y.o. female s/p hiatal hernia repair with syeda fundoplication on 6/89/4800  -s/p trach/peg 3/27/2020  -s/p trach exchange 4/2/2020    Plan:  1. Will hold tube feeds for 24 hours from this am. Do not resume trickle tube feeds later today and we will re-assess tomorrow. 2. Continue reglan 10mg q6h   3. Serial abdominal exams  4. Continue TPN at this time  5. Carafate 4 times a day, Protonix BID  6. Scheduled bowel regimen to continue  7.  Medical management per primary team      Electronically signed by Deirdre Basurto DO  on 4/5/2020 at 7:04 AM

## 2020-04-05 NOTE — PROGRESS NOTES
Esophageal dilatation    Upper GI bleeding    Acute on chronic blood loss anemia    Shock (HCC)    Fever    Acute postoperative respiratory insufficiency    Critical illness polyneuropathy ([facility] [address]  Resolved Problems:    * No resolved hospital problems. *           PLAN:     WEAN PER PROTOCOL:  [] No   [] Yes  [x] N/A    ICU PROPHYLAXIS:  Stress ulcer:  [x] PPI Agent  [] X2Radse [] Sucralfate  [] Other:  VTE:   [] Enoxaparin  [x] Unfract. Heparin Subcut  [] EPC Cuffs    NUTRITION:  [] NPO [] Tube Feeding (Specify: ) [x] TPN  [] PO    HOME MEDS RECONCILED: [] No  [x] Yes    CONSULTATION NEEDED:  [x] No  [] Yes    FAMILY UPDATED:    [] No  [x] Yes    TRANSFER OUT OF ICU:   [] No  [x] Yes      Plan:  1. Acute respiratory failure with hypoxia due to aspiration pneumonitis. s/p intubation on 3/17, s/p tracheostomy on 3/27 with revision on 4/2. Improving. Continue mechanical ventilation. Wean Precedex as tolerated. 2. Acute blood loss anemia. Secondary to upper GI bleed. S/p 3 PRBC. hemoglobin stable. 3. Generalized anxiety disorder. Stable. Continue BuSpar, Lexapro and trazodone. Also on Seroquel 100 mg nightly. 4. Septic shock. Resolved. Completed meropenem and Diflucan. 5. Status post Nissen fundoplication and hiatal hernia repair. General surgery following. Hold TF per General Surgery at this time. Continue TPN, Reglan and aggressive bowel regimen. 6. Subcu heparin for DVT prophylaxis. 7. Discharge planning. Okay for transfer to [facility].         Josiah Roa DO  Emergency Medicine/Critical [address]  4/5/2020 7:28 AM

## 2020-04-06 ENCOUNTER — HOSPITAL ENCOUNTER (OUTPATIENT)
Dept: MEDSURG UNIT | Age: 75
Discharge: ANOTHER HOSPITAL | End: 2020-05-12
Attending: INTERNAL MEDICINE | Admitting: INTERNAL MEDICINE

## 2020-04-06 ENCOUNTER — TELEPHONE (OUTPATIENT)
Dept: PULMONOLOGY | Age: 75
End: 2020-04-06

## 2020-04-06 VITALS
RESPIRATION RATE: 25 BRPM | WEIGHT: 179.01 LBS | DIASTOLIC BLOOD PRESSURE: 67 MMHG | HEIGHT: 62 IN | OXYGEN SATURATION: 97 % | BODY MASS INDEX: 32.94 KG/M2 | HEART RATE: 101 BPM | SYSTOLIC BLOOD PRESSURE: 148 MMHG | TEMPERATURE: 99.5 F

## 2020-04-06 LAB
ABSOLUTE EOS #: 0.42 K/UL (ref 0–0.4)
ABSOLUTE IMMATURE GRANULOCYTE: 0.52 K/UL (ref 0–0.3)
ABSOLUTE LYMPH #: 1.87 K/UL (ref 1–4.8)
ABSOLUTE MONO #: 1.04 K/UL (ref 0.1–0.8)
ALLEN TEST: POSITIVE
ANION GAP SERPL CALCULATED.3IONS-SCNC: 12 MMOL/L (ref 9–17)
BASOPHILS # BLD: 0 % (ref 0–2)
BASOPHILS ABSOLUTE: 0 K/UL (ref 0–0.2)
BUN BLDV-MCNC: 27 MG/DL (ref 8–23)
BUN/CREAT BLD: ABNORMAL (ref 9–20)
CALCIUM SERPL-MCNC: 9.7 MG/DL (ref 8.6–10.4)
CHLORIDE BLD-SCNC: 105 MMOL/L (ref 98–107)
CO2: 26 MMOL/L (ref 20–31)
CREAT SERPL-MCNC: 0.54 MG/DL (ref 0.5–0.9)
DIFFERENTIAL TYPE: ABNORMAL
EOSINOPHILS RELATIVE PERCENT: 4 % (ref 1–4)
FIO2: 40
GFR AFRICAN AMERICAN: >60 ML/MIN
GFR NON-AFRICAN AMERICAN: >60 ML/MIN
GFR SERPL CREATININE-BSD FRML MDRD: ABNORMAL ML/MIN/{1.73_M2}
GFR SERPL CREATININE-BSD FRML MDRD: ABNORMAL ML/MIN/{1.73_M2}
GLUCOSE BLD-MCNC: 100 MG/DL (ref 65–105)
GLUCOSE BLD-MCNC: 117 MG/DL (ref 70–99)
GLUCOSE BLD-MCNC: 123 MG/DL (ref 74–100)
GLUCOSE BLD-MCNC: 124 MG/DL (ref 65–105)
HCT VFR BLD CALC: 34.5 % (ref 36.3–47.1)
HEMOGLOBIN: 10 G/DL (ref 11.9–15.1)
IMMATURE GRANULOCYTES: 5 %
LYMPHOCYTES # BLD: 18 % (ref 24–44)
MAGNESIUM: 1.8 MG/DL (ref 1.6–2.6)
MCH RBC QN AUTO: 29.1 PG (ref 25.2–33.5)
MCHC RBC AUTO-ENTMCNC: 29 G/DL (ref 28.4–34.8)
MCV RBC AUTO: 100.3 FL (ref 82.6–102.9)
MODE: ABNORMAL
MONOCYTES # BLD: 10 % (ref 1–7)
MORPHOLOGY: ABNORMAL
NEGATIVE BASE EXCESS, ART: ABNORMAL (ref 0–2)
NRBC AUTOMATED: 0 PER 100 WBC
O2 DEVICE/FLOW/%: ABNORMAL
PATIENT TEMP: 37.7
PDW BLD-RTO: 16.6 % (ref 11.8–14.4)
PHOSPHORUS: 4.1 MG/DL (ref 2.6–4.5)
PLATELET # BLD: 362 K/UL (ref 138–453)
PLATELET ESTIMATE: ABNORMAL
PMV BLD AUTO: 11.6 FL (ref 8.1–13.5)
POC HCO3: 28.4 MMOL/L (ref 21–28)
POC O2 SATURATION: 99 % (ref 94–98)
POC PCO2 TEMP: 43 MM HG
POC PCO2: 41.9 MM HG (ref 35–48)
POC PH TEMP: 7.43
POC PH: 7.44 (ref 7.35–7.45)
POC PO2 TEMP: 130 MM HG
POC PO2: 125.9 MM HG (ref 83–108)
POSITIVE BASE EXCESS, ART: 4 (ref 0–3)
POTASSIUM SERPL-SCNC: 4 MMOL/L (ref 3.7–5.3)
RBC # BLD: 3.44 M/UL (ref 3.95–5.11)
RBC # BLD: ABNORMAL 10*6/UL
SAMPLE SITE: ABNORMAL
SEG NEUTROPHILS: 63 % (ref 36–66)
SEGMENTED NEUTROPHILS ABSOLUTE COUNT: 6.55 K/UL (ref 1.8–7.7)
SODIUM BLD-SCNC: 143 MMOL/L (ref 135–144)
TCO2 (CALC), ART: 30 MMOL/L (ref 22–29)
TRIGL SERPL-MCNC: 468 MG/DL
WBC # BLD: 10.4 K/UL (ref 3.5–11.3)
WBC # BLD: ABNORMAL 10*3/UL

## 2020-04-06 PROCEDURE — 82803 BLOOD GASES ANY COMBINATION: CPT

## 2020-04-06 PROCEDURE — 99233 SBSQ HOSP IP/OBS HIGH 50: CPT | Performed by: INTERNAL MEDICINE

## 2020-04-06 PROCEDURE — 6360000002 HC RX W HCPCS: Performed by: STUDENT IN AN ORGANIZED HEALTH CARE EDUCATION/TRAINING PROGRAM

## 2020-04-06 PROCEDURE — 6370000000 HC RX 637 (ALT 250 FOR IP): Performed by: STUDENT IN AN ORGANIZED HEALTH CARE EDUCATION/TRAINING PROGRAM

## 2020-04-06 PROCEDURE — 84478 ASSAY OF TRIGLYCERIDES: CPT

## 2020-04-06 PROCEDURE — C9113 INJ PANTOPRAZOLE SODIUM, VIA: HCPCS | Performed by: STUDENT IN AN ORGANIZED HEALTH CARE EDUCATION/TRAINING PROGRAM

## 2020-04-06 PROCEDURE — 2580000003 HC RX 258: Performed by: STUDENT IN AN ORGANIZED HEALTH CARE EDUCATION/TRAINING PROGRAM

## 2020-04-06 PROCEDURE — 94770 HC ETCO2 MONITOR DAILY: CPT

## 2020-04-06 PROCEDURE — 94761 N-INVAS EAR/PLS OXIMETRY MLT: CPT

## 2020-04-06 PROCEDURE — 36415 COLL VENOUS BLD VENIPUNCTURE: CPT

## 2020-04-06 PROCEDURE — 80048 BASIC METABOLIC PNL TOTAL CA: CPT

## 2020-04-06 PROCEDURE — 84100 ASSAY OF PHOSPHORUS: CPT

## 2020-04-06 PROCEDURE — 82947 ASSAY GLUCOSE BLOOD QUANT: CPT

## 2020-04-06 PROCEDURE — 94640 AIRWAY INHALATION TREATMENT: CPT

## 2020-04-06 PROCEDURE — 85025 COMPLETE CBC W/AUTO DIFF WBC: CPT

## 2020-04-06 PROCEDURE — 2700000000 HC OXYGEN THERAPY PER DAY

## 2020-04-06 PROCEDURE — 36600 WITHDRAWAL OF ARTERIAL BLOOD: CPT

## 2020-04-06 PROCEDURE — 94003 VENT MGMT INPAT SUBQ DAY: CPT

## 2020-04-06 PROCEDURE — 83735 ASSAY OF MAGNESIUM: CPT

## 2020-04-06 RX ORDER — METOCLOPRAMIDE HYDROCHLORIDE 5 MG/ML
10 INJECTION INTRAMUSCULAR; INTRAVENOUS EVERY 6 HOURS
Qty: 20 ML | Refills: 0 | Status: ON HOLD | OUTPATIENT
Start: 2020-04-06 | End: 2020-11-10 | Stop reason: HOSPADM

## 2020-04-06 RX ORDER — MINERAL OIL AND WHITE PETROLATUM 150; 830 MG/G; MG/G
OINTMENT OPHTHALMIC PRN
Qty: 1 TUBE | Refills: 2 | Status: ON HOLD | OUTPATIENT
Start: 2020-04-06 | End: 2020-05-29 | Stop reason: HOSPADM

## 2020-04-06 RX ORDER — BUSPIRONE HYDROCHLORIDE 10 MG/1
20 TABLET ORAL 3 TIMES DAILY
Qty: 30 TABLET | Refills: 2 | Status: ON HOLD | OUTPATIENT
Start: 2020-04-06 | End: 2020-07-07 | Stop reason: SDUPTHER

## 2020-04-06 RX ORDER — ESCITALOPRAM OXALATE 20 MG/1
20 TABLET ORAL DAILY
Qty: 30 TABLET | Refills: 3 | Status: ON HOLD | OUTPATIENT
Start: 2020-04-06 | End: 2020-07-07 | Stop reason: SDUPTHER

## 2020-04-06 RX ORDER — QUETIAPINE FUMARATE 100 MG/1
100 TABLET, FILM COATED ORAL NIGHTLY
Qty: 60 TABLET | Refills: 3 | Status: ON HOLD | OUTPATIENT
Start: 2020-04-06 | End: 2020-12-16 | Stop reason: HOSPADM

## 2020-04-06 RX ORDER — FUROSEMIDE 20 MG/1
20 TABLET ORAL DAILY
Qty: 60 TABLET | Refills: 3 | Status: ON HOLD | OUTPATIENT
Start: 2020-04-06 | End: 2020-12-16 | Stop reason: HOSPADM

## 2020-04-06 RX ORDER — MAGNESIUM CARB/ALUMINUM HYDROX 105-160MG
45 TABLET,CHEWABLE ORAL DAILY
Qty: 100 ML | Refills: 2 | Status: ON HOLD | OUTPATIENT
Start: 2020-04-06 | End: 2020-05-29 | Stop reason: HOSPADM

## 2020-04-06 RX ORDER — PANTOPRAZOLE SODIUM 40 MG/10ML
40 INJECTION, POWDER, LYOPHILIZED, FOR SOLUTION INTRAVENOUS 2 TIMES DAILY
Qty: 2 EACH | Refills: 0 | Status: ON HOLD | OUTPATIENT
Start: 2020-04-06 | End: 2020-07-07 | Stop reason: HOSPADM

## 2020-04-06 RX ORDER — BISACODYL 10 MG
10 SUPPOSITORY, RECTAL RECTAL DAILY
Qty: 30 SUPPOSITORY | Refills: 0 | Status: SHIPPED | OUTPATIENT
Start: 2020-04-06 | End: 2020-05-06

## 2020-04-06 RX ORDER — POLYETHYLENE GLYCOL 3350 17 G/17G
17 POWDER, FOR SOLUTION ORAL DAILY
Qty: 527 G | Refills: 1 | Status: SHIPPED | OUTPATIENT
Start: 2020-04-06 | End: 2020-05-06

## 2020-04-06 RX ORDER — METOPROLOL SUCCINATE 25 MG/1
25 TABLET, EXTENDED RELEASE ORAL NIGHTLY
Qty: 30 TABLET | Refills: 3 | Status: ON HOLD | OUTPATIENT
Start: 2020-04-06 | End: 2020-05-15

## 2020-04-06 RX ORDER — SUCRALFATE 1 G/1
1 TABLET ORAL 4 TIMES DAILY
Qty: 120 TABLET | Refills: 3 | Status: SHIPPED | OUTPATIENT
Start: 2020-04-06

## 2020-04-06 RX ORDER — TRAZODONE HYDROCHLORIDE 100 MG/1
100 TABLET ORAL NIGHTLY
Qty: 30 TABLET | Refills: 0 | Status: ON HOLD | OUTPATIENT
Start: 2020-04-06 | End: 2021-11-25 | Stop reason: SDUPTHER

## 2020-04-06 RX ADMIN — AMITRIPTYLINE HYDROCHLORIDE 25 MG: 25 TABLET, FILM COATED ORAL at 08:19

## 2020-04-06 RX ADMIN — DOCUSATE SODIUM 100 MG: 50 LIQUID ORAL at 08:21

## 2020-04-06 RX ADMIN — METOCLOPRAMIDE 10 MG: 5 INJECTION, SOLUTION INTRAMUSCULAR; INTRAVENOUS at 00:25

## 2020-04-06 RX ADMIN — ESCITALOPRAM OXALATE 20 MG: 10 TABLET ORAL at 08:21

## 2020-04-06 RX ADMIN — Medication 10 ML: at 08:28

## 2020-04-06 RX ADMIN — SUCRALFATE 1 G: 1 TABLET ORAL at 05:26

## 2020-04-06 RX ADMIN — IPRATROPIUM BROMIDE AND ALBUTEROL SULFATE 1 AMPULE: .5; 3 SOLUTION RESPIRATORY (INHALATION) at 08:18

## 2020-04-06 RX ADMIN — ANTI-FUNGAL POWDER MICONAZOLE NITRATE TALC FREE: 1.42 POWDER TOPICAL at 08:25

## 2020-04-06 RX ADMIN — CLONAZEPAM 0.5 MG: 0.5 TABLET ORAL at 08:31

## 2020-04-06 RX ADMIN — FUROSEMIDE 20 MG: 10 INJECTION, SOLUTION INTRAMUSCULAR; INTRAVENOUS at 08:23

## 2020-04-06 RX ADMIN — METOCLOPRAMIDE 10 MG: 5 INJECTION, SOLUTION INTRAMUSCULAR; INTRAVENOUS at 05:26

## 2020-04-06 RX ADMIN — GABAPENTIN 300 MG: 250 SOLUTION ORAL at 05:26

## 2020-04-06 RX ADMIN — SUCRALFATE 1 G: 1 TABLET ORAL at 13:00

## 2020-04-06 RX ADMIN — LORAZEPAM 1 MG: 2 INJECTION INTRAMUSCULAR at 03:24

## 2020-04-06 RX ADMIN — ACETAMINOPHEN 650 MG: 325 TABLET ORAL at 09:22

## 2020-04-06 RX ADMIN — BUSPIRONE HYDROCHLORIDE 20 MG: 10 TABLET ORAL at 08:20

## 2020-04-06 RX ADMIN — LORAZEPAM 1 MG: 2 INJECTION INTRAMUSCULAR at 10:08

## 2020-04-06 RX ADMIN — HEPARIN SODIUM 5000 UNITS: 5000 INJECTION INTRAVENOUS; SUBCUTANEOUS at 05:27

## 2020-04-06 RX ADMIN — PANTOPRAZOLE SODIUM 40 MG: 40 INJECTION, POWDER, FOR SOLUTION INTRAVENOUS at 08:26

## 2020-04-06 RX ADMIN — SUCRALFATE 1 G: 1 TABLET ORAL at 00:27

## 2020-04-06 RX ADMIN — BISACODYL 10 MG: 10 SUPPOSITORY RECTAL at 09:23

## 2020-04-06 RX ADMIN — IPRATROPIUM BROMIDE AND ALBUTEROL SULFATE 1 AMPULE: .5; 3 SOLUTION RESPIRATORY (INHALATION) at 12:04

## 2020-04-06 RX ADMIN — Medication 10 ML: at 08:26

## 2020-04-06 RX ADMIN — MINERAL OIL 45 ML: 1000 SOLUTION ORAL at 08:24

## 2020-04-06 RX ADMIN — ALBUTEROL SULFATE 2.5 MG: 2.5 SOLUTION RESPIRATORY (INHALATION) at 04:01

## 2020-04-06 RX ADMIN — METOCLOPRAMIDE 10 MG: 5 INJECTION, SOLUTION INTRAMUSCULAR; INTRAVENOUS at 13:00

## 2020-04-06 ASSESSMENT — PULMONARY FUNCTION TESTS
PIF_VALUE: 14

## 2020-04-06 NOTE — PROGRESS NOTES
Centrilobular emphysema (HCC) [J43.2]     Atelectasis [J98.11]     Pleural effusion [J90]     Esophageal dilatation [K22.8]        76 y.o. female s/p hiatal hernia repair with syeda fundoplication on 3/26/6295  -s/p trach/peg 3/27/2020  -s/p trach exchange 4/2/2020    Plan:  1. Ok to trickle TF at 10 mL/hr  2. Continue reglan 10mg q6h   3. Serial abdominal exams  4. Continue TPN at this time  5. Carafate 4 times a day, Protonix BID  6. Scheduled bowel regimen to continue  7. Medical management per primary team  8.  From a surgical standpoint, ok to discharge to [facility]      Electronically signed by Abigail Merino DO  on 4/6/2020 at 5:48 AM

## 2020-04-06 NOTE — PLAN OF CARE
OXYGENATION/RESPIRATORY FUNCTION  Goal: Patient will maintain patent airway  4/6/2020 0743 by Lyndsay Tafoya RN  Outcome: Ongoing  4/5/2020 2015 by Elena Rome RCP  Outcome: Ongoing  Goal: Patient will achieve/maintain normal respiratory rate/effort  Description: Respiratory rate and effort will be within normal limits for the patient  4/6/2020 0743 by Lyndsay Tafoya RN  Outcome: Ongoing  4/5/2020 2015 by Elena Rome RCP  Outcome: Ongoing     Problem: MECHANICAL VENTILATION  Goal: Patient will maintain patent airway  4/6/2020 0743 by Lyndsay Tafoya RN  Outcome: Ongoing  4/5/2020 2015 by Elena Rome RCP  Outcome: Ongoing  Goal: Oral health is maintained or improved  4/6/2020 0743 by Lyndsay Tafoya RN  Outcome: Ongoing  4/5/2020 2015 by Elena Rome RCP  Outcome: Ongoing  Goal: Ability to express needs and understand communication  4/6/2020 0743 by Lyndsay Tafoya RN  Outcome: Ongoing  4/5/2020 2015 by Elena Rome RCP  Outcome: Ongoing  Goal: Mobility/activity is maintained at optimum level for patient  4/6/2020 0743 by Lyndsay Tafoya RN  Outcome: Ongoing  4/5/2020 2015 by Elena Rome RCP  Outcome: Ongoing  Goal: Tracheostomy will be managed safely  4/6/2020 0743 by Lyndsay Tafoya RN  Outcome: Ongoing  4/5/2020 2015 by Elena Rome RCP  Outcome: Ongoing     Problem: SKIN INTEGRITY  Goal: Skin integrity is maintained or improved  4/6/2020 0743 by Lyndsay Tafoya RN  Outcome: Ongoing  4/5/2020 2015 by Elena Rome RCP  Outcome: Ongoing     Problem: Nutrition  Goal: Optimal nutrition therapy  Description: Nutrition Problem: Inadequate oral intake  Intervention: Food and/or Nutrient Delivery: Continue Parenteral Nutrition  Nutritional Goals: meet % of estimated nutrition needs    Outcome: Ongoing     Problem: Musculor/Skeletal Functional Status  Goal: Highest potential functional level  Outcome: Ongoing

## 2020-04-06 NOTE — PROGRESS NOTES
Pt status- callesd report to [facility] AT Critical access hospital nurse . Questions answered. Transportation to be here at 1 pm..

## 2020-04-06 NOTE — PROGRESS NOTES
CPAP  Vt Ordered: 400 mL  Pressure Ordered: 0  Rate Set: 26 bmp  Pressure Support: 8 cmH20  FiO2 : 30 %  Sensitivity: 1  PEEP/CPAP: 5  I Time/ I Time %: 0.7 s  Cuff Pressure (cm H2O): 20 cm H2O  Humidification Source: HME  Humidification Temp: 31  Humidification Temp Measured: 31  Circuit Condensation: Drained  Nitric Oxide/Epoprostenol In Use?: No  Additional Respiratory  Assessments  Pulse: 109  Resp: 29  SpO2: 95 %  End Tidal CO2: 36 (%)  pCO2 (TCOM, mmHg): 40 mmHg  Position: Semi-Shelton's  Humidification Source: HME  Humidification Temp: 31  Circuit Condensation: Drained  Oral Care Completed?: Yes  Oral Care: Suction toothette, Mouth suctioned, Mouth swabbed, Mouthwash  Subglottic Suction Done?: No  Cuff Pressure (cm H2O): 20 cm H2O  Skin barrier applied: No    ABGs:   Arterial Blood Gas result:  pH 7.439; pCO2 41.9; pO2 125  Recent Labs     04/04/20  0434 04/06/20  0425   POCPH 7.397 7.439   POCPCO2 52.8* 41.9   POCPO2 103.7 125.9*   POCHCO3 32.5* 28.4*       Lab Results   Component Value Date    EDK3AXP 30 04/06/2020    FIO2 40.0 04/06/2020         DATA:  Complete Blood Count:   Recent Labs     04/05/20  0430 04/06/20  0513   WBC 9.8 10.4   RBC 3.47* 3.44*   HGB 10.0* 10.0*   HCT 35.0* 34.5*   .9 100.3   MCH 28.8 29.1   MCHC 28.6 29.0   RDW 16.5* 16.6*    362   MPV 10.8 11.6        Last 3 Blood Glucose:   Recent Labs     04/06/20  0513   GLUCOSE 117*        PT/INR:    Lab Results   Component Value Date    PROTIME 10.0 03/27/2020    INR 1.0 03/27/2020     PTT:    Lab Results   Component Value Date    APTT 26.2 03/27/2020       Comprehensive Metabolic Profile:   Recent Labs     04/06/20  0513      K 4.0      CO2 26   BUN 27*   CREATININE 0.54   GLUCOSE 117*   CALCIUM 9.7      Magnesium:   Lab Results   Component Value Date    MG 1.8 04/06/2020    MG 2.1 04/03/2020    MG 2.1 04/01/2020     Phosphorus:   Lab Results   Component Value Date    PHOS 4.1 04/06/2020    PHOS 4.0 04/03/2020 PHOS 3.4 04/01/2020     Ionized Calcium: No results found for: CAION     Urinalysis:   Lab Results   Component Value Date    NITRU NEGATIVE 03/17/2020    COLORU YELLOW 03/17/2020    PHUR 5.5 03/17/2020    WBCUA 0 TO 2 03/17/2020    RBCUA 20 TO 50 03/17/2020    MUCUS NOT REPORTED 03/17/2020    TRICHOMONAS NOT REPORTED 03/17/2020    YEAST MANY 03/17/2020    BACTERIA NOT REPORTED 03/17/2020    CLARITYU Clear 06/29/2018    SPECGRAV 1.013 03/17/2020    LEUKOCYTESUR NEGATIVE 03/17/2020    UROBILINOGEN Normal 03/17/2020    BILIRUBINUR NEGATIVE 03/17/2020    BILIRUBINUR Negative 06/29/2018    BLOODU Non-hemolyzed trace 06/29/2018    GLUCOSEU NEGATIVE 03/17/2020    KETUA NEGATIVE 03/17/2020    AMORPHOUS NOT REPORTED 03/17/2020       HgBA1c:  No results found for: LABA1C  TSH:  No results found for: TSH  Lactic Acid:   Lab Results   Component Value Date    LACTA 4.1 04/30/2018      Troponin: No results for input(s): TROPONINI in the last 72 hours.     Microbiology:        Other Labs:  CBC with Differential:    Lab Results   Component Value Date    WBC 10.4 04/06/2020    RBC 3.44 04/06/2020    HGB 10.0 04/06/2020    HCT 34.5 04/06/2020     04/06/2020    .3 04/06/2020    MCH 29.1 04/06/2020    MCHC 29.0 04/06/2020    RDW 16.6 04/06/2020    NRBC 1 03/30/2020    LYMPHOPCT 18 04/06/2020    MONOPCT 10 04/06/2020    BASOPCT 0 04/06/2020    MONOSABS 1.04 04/06/2020    LYMPHSABS 1.87 04/06/2020    EOSABS 0.42 04/06/2020    BASOSABS 0.00 04/06/2020    DIFFTYPE NOT REPORTED 04/06/2020     BMP:    Lab Results   Component Value Date     04/06/2020    K 4.0 04/06/2020     04/06/2020    CO2 26 04/06/2020    BUN 27 04/06/2020    LABALBU 2.0 04/02/2020    CREATININE 0.54 04/06/2020    CALCIUM 9.7 04/06/2020    GFRAA >60 04/06/2020    LABGLOM >60 04/06/2020    GLUCOSE 117 04/06/2020         Radiology/Imaging:  Persistent bilateral pulmonary opacities and bilateral pleural effusions    ASSESSMENT:     Active Problems: Aspiration pneumonia (HCC)    Centrilobular emphysema (HCC)    Atelectasis    Pleural effusion    Esophageal dilatation    Upper GI bleeding    Acute on chronic blood loss anemia    Shock (HCC)    Fever    Acute postoperative respiratory insufficiency    Critical illness polyneuropathy ([facility]  Resolved Problems:    * No resolved hospital problems. *      PLAN:     WEAN PER PROTOCOL:  [] No   [x] Yes  [] N/A    ICU PROPHYLAXIS:  Stress ulcer:  [x] PPI Agent  [] [id number] [] Sucralfate  [] Other:  VTE:   [] Enoxaparin  [x] Unfract. Heparin Subcut  [] EPC Cuffs    NUTRITION:  [] NPO [] Tube Feeding (Specify: ) [x] TPN  [] PO    HOME MEDS RECONCILED: [] No  [x] Yes    CONSULTATION NEEDED:  [x] No  [] Yes    FAMILY UPDATED:    [] No  [x] Yes    TRANSFER OUT OF ICU:   [] No  [x] Yes      Plan:  1. Acute respiratory failure with hypoxia due to aspiration pneumonitis. s/p intubation on 3/17, s/p tracheostomy on 3/27 with revision on 4/2. Improving. Continue mechanical ventilation. Off precedex. Tolerating weaning on CPAP    2. Acute blood loss anemia. Secondary to upper GI bleed. S/p 3 PRBC. hemoglobin stable. 3. Generalized anxiety disorder. Stable. Continue BuSpar, Lexapro and trazodone. Also on Seroquel 100 mg nightly. 4. Septic shock. Resolved. Completed meropenem and Diflucan. 5. Status post Nissen fundoplication and hiatal hernia repair. General surgery following. Hold TF per General Surgery at this time. Continue TPN, Reglan and aggressive bowel regimen. 6. Subcu heparin for DVT prophylaxis. 7. Discharge planning. Okay for [place]. Glenn Anaya MD  PGY-2 Internal Medicine Resident/ [address] [place] [address]  4/6/2020 12:12 PM  Attending Physician Statement  I have discussed the care of [facility] Doctor, including pertinent history and exam findings,  with the resident.  I have seen and examined the patient and the key elements of all parts of the encounter have been performed by me. I agree with the assessment, plan and orders as documented by the resident with additions . Treatment plan Discussed with nursing staff in detail , all questions answered . Electronically signed by Michael Dhillon MD on   4/6/20 at 8:16 PM EDT    Please note that this chart was generated using voice recognition Dragon dictation software. Although every effort was made to ensure the accuracy of this automated transcription, some errors in transcription may have occurred.

## 2020-04-07 LAB
ABSOLUTE EOS #: 0.19 K/UL (ref 0–0.44)
ABSOLUTE IMMATURE GRANULOCYTE: 0.3 K/UL (ref 0–0.3)
ABSOLUTE LYMPH #: 2.3 K/UL (ref 1.1–3.7)
ABSOLUTE MONO #: 1.05 K/UL (ref 0.1–1.2)
BASOPHILS # BLD: 1 % (ref 0–2)
BASOPHILS ABSOLUTE: 0.06 K/UL (ref 0–0.2)
DIFFERENTIAL TYPE: ABNORMAL
EOSINOPHILS RELATIVE PERCENT: 2 % (ref 1–4)
HCT VFR BLD CALC: 35.6 % (ref 36.3–47.1)
HEMOGLOBIN: 10.3 G/DL (ref 11.9–15.1)
IMMATURE GRANULOCYTES: 3 %
LYMPHOCYTES # BLD: 20 % (ref 24–43)
MCH RBC QN AUTO: 28.9 PG (ref 25.2–33.5)
MCHC RBC AUTO-ENTMCNC: 28.9 G/DL (ref 28.4–34.8)
MCV RBC AUTO: 100 FL (ref 82.6–102.9)
MONOCYTES # BLD: 9 % (ref 3–12)
NRBC AUTOMATED: 0 PER 100 WBC
PDW BLD-RTO: 16.5 % (ref 11.8–14.4)
PLATELET # BLD: 180 K/UL (ref 138–453)
PLATELET ESTIMATE: ABNORMAL
PMV BLD AUTO: 11.9 FL (ref 8.1–13.5)
RBC # BLD: 3.56 M/UL (ref 3.95–5.11)
RBC # BLD: ABNORMAL 10*6/UL
SEG NEUTROPHILS: 65 % (ref 36–65)
SEGMENTED NEUTROPHILS ABSOLUTE COUNT: 7.69 K/UL (ref 1.5–8.1)
WBC # BLD: 11.6 K/UL (ref 3.5–11.3)
WBC # BLD: ABNORMAL 10*3/UL

## 2020-04-07 PROCEDURE — 85025 COMPLETE CBC W/AUTO DIFF WBC: CPT

## 2020-04-10 LAB — MAGNESIUM: 2.9 MG/DL (ref 1.6–2.6)

## 2020-04-10 PROCEDURE — 83735 ASSAY OF MAGNESIUM: CPT

## 2020-04-11 LAB
ANION GAP SERPL CALCULATED.3IONS-SCNC: 19 MMOL/L (ref 9–17)
BUN BLDV-MCNC: 73 MG/DL (ref 8–23)
BUN/CREAT BLD: 48 (ref 9–20)
CALCIUM SERPL-MCNC: 10 MG/DL (ref 8.6–10.4)
CHLORIDE BLD-SCNC: 105 MMOL/L (ref 98–107)
CO2: 26 MMOL/L (ref 20–31)
CREAT SERPL-MCNC: 1.53 MG/DL (ref 0.5–0.9)
GFR AFRICAN AMERICAN: 40 ML/MIN
GFR NON-AFRICAN AMERICAN: 33 ML/MIN
GFR SERPL CREATININE-BSD FRML MDRD: ABNORMAL ML/MIN/{1.73_M2}
GFR SERPL CREATININE-BSD FRML MDRD: ABNORMAL ML/MIN/{1.73_M2}
GLUCOSE BLD-MCNC: 132 MG/DL (ref 70–99)
MAGNESIUM: 3 MG/DL (ref 1.6–2.6)
POTASSIUM SERPL-SCNC: 4.7 MMOL/L (ref 3.7–5.3)
PROCALCITONIN: 0.76 NG/ML
SODIUM BLD-SCNC: 150 MMOL/L (ref 135–144)

## 2020-04-11 PROCEDURE — 87086 URINE CULTURE/COLONY COUNT: CPT

## 2020-04-11 PROCEDURE — 83735 ASSAY OF MAGNESIUM: CPT

## 2020-04-11 PROCEDURE — 80048 BASIC METABOLIC PNL TOTAL CA: CPT

## 2020-04-11 PROCEDURE — 84145 PROCALCITONIN (PCT): CPT

## 2020-04-11 PROCEDURE — 87040 BLOOD CULTURE FOR BACTERIA: CPT

## 2020-04-12 LAB
CULTURE: NORMAL
Lab: NORMAL
SPECIMEN DESCRIPTION: NORMAL

## 2020-04-12 NOTE — DISCHARGE SUMMARY
metoprolol succinate (TOPROL XL) 25 MG extended release tablet Take 1 tablet by mouth nightly, Disp-30 tablet, R-3Normal      polyethylene glycol (GLYCOLAX) packet 17 g by Per G Tube route daily, Disp-527 g, R-1Normal      traZODone (DESYREL) 100 MG tablet Take 1 tablet by mouth nightly, Disp-30 tablet, R-0Normal         CONTINUE these medications which have NOT CHANGED    Details   RA VITAMIN D-3 50 MCG (2000 UT) CAPS take 1 capsule by mouth once daily, DAWHistorical Med      amitriptyline (ELAVIL) 25 MG tablet Take 25 mg by mouth three times daily Historical Med      Oyster Shell 500 MG TABS Take 500 mg by mouth 2 times daily Historical Med      clonazePAM (KLONOPIN) 0.5 MG tablet Take 0.5 mg by mouth 2 times daily. Taking 1/2 tablet BIDHistorical Med      Acetaminophen 500 MG CAPS Take 1 tablet by mouth as needed for Pain (follow OTC instructions)Historical Med      calcium carbonate (TUMS) 500 MG chewable tablet Take 1 tablet by mouth 3 times daily as needed for HeartburnHistorical Med      gabapentin (NEURONTIN) 300 MG capsule Take 300 mg by mouth 3 times daily. simvastatin (ZOCOR) 40 MG tablet Take 40 mg by mouth nightly.          STOP taking these medications       oxyCODONE (ROXICODONE) 5 MG immediate release tablet Comments:   Reason for Stopping:         ondansetron (ZOFRAN) 4 MG tablet Comments:   Reason for Stopping:         pantoprazole (PROTONIX) 40 MG tablet Comments:   Reason for Stopping:         famotidine (PEPCID) 10 MG tablet Comments:   Reason for Stopping:         amLODIPine (NORVASC) 10 MG tablet Comments:   Reason for Stopping:             Activity: activity as tolerated    Diet: TPN, advance TF as tolerated    Disposition: long term care facility    Follow-up:  Follow up with Kaia David MD,  As directed    Electronically signed by Pao Peres MD on 4/12/2020 at 9:08 AM     Time Spent on discharge is more than 30 minutes in the examination, evaluation, counseling and review of

## 2020-04-13 LAB
ALBUMIN SERPL-MCNC: 2.8 G/DL (ref 3.5–5.2)
ALBUMIN/GLOBULIN RATIO: ABNORMAL (ref 1–2.5)
ALP BLD-CCNC: 148 U/L (ref 35–104)
ALT SERPL-CCNC: 41 U/L (ref 5–33)
ANION GAP SERPL CALCULATED.3IONS-SCNC: 15 MMOL/L (ref 9–17)
AST SERPL-CCNC: 42 U/L
BILIRUB SERPL-MCNC: 0.36 MG/DL (ref 0.3–1.2)
BUN BLDV-MCNC: 68 MG/DL (ref 8–23)
BUN/CREAT BLD: 46 (ref 9–20)
CALCIUM SERPL-MCNC: 9.2 MG/DL (ref 8.6–10.4)
CHLORIDE BLD-SCNC: 106 MMOL/L (ref 98–107)
CO2: 22 MMOL/L (ref 20–31)
CREAT SERPL-MCNC: 1.49 MG/DL (ref 0.5–0.9)
GFR AFRICAN AMERICAN: 41 ML/MIN
GFR NON-AFRICAN AMERICAN: 34 ML/MIN
GFR SERPL CREATININE-BSD FRML MDRD: ABNORMAL ML/MIN/{1.73_M2}
GFR SERPL CREATININE-BSD FRML MDRD: ABNORMAL ML/MIN/{1.73_M2}
GLUCOSE BLD-MCNC: 124 MG/DL (ref 70–99)
MAGNESIUM: 2.8 MG/DL (ref 1.6–2.6)
PHOSPHORUS: 5.4 MG/DL (ref 2.6–4.5)
POTASSIUM SERPL-SCNC: 5.2 MMOL/L (ref 3.7–5.3)
SODIUM BLD-SCNC: 143 MMOL/L (ref 135–144)
TOTAL PROTEIN: 7.1 G/DL (ref 6.4–8.3)
TRIGL SERPL-MCNC: 349 MG/DL

## 2020-04-13 PROCEDURE — 84100 ASSAY OF PHOSPHORUS: CPT

## 2020-04-13 PROCEDURE — 80053 COMPREHEN METABOLIC PANEL: CPT

## 2020-04-13 PROCEDURE — 87070 CULTURE OTHR SPECIMN AEROBIC: CPT

## 2020-04-13 PROCEDURE — 83735 ASSAY OF MAGNESIUM: CPT

## 2020-04-13 PROCEDURE — 84478 ASSAY OF TRIGLYCERIDES: CPT

## 2020-04-13 PROCEDURE — 87205 SMEAR GRAM STAIN: CPT

## 2020-04-14 LAB
ANION GAP SERPL CALCULATED.3IONS-SCNC: 14 MMOL/L (ref 9–17)
BUN BLDV-MCNC: 61 MG/DL (ref 8–23)
BUN/CREAT BLD: 49 (ref 9–20)
CALCIUM SERPL-MCNC: 9.4 MG/DL (ref 8.6–10.4)
CHLORIDE BLD-SCNC: 109 MMOL/L (ref 98–107)
CO2: 22 MMOL/L (ref 20–31)
CREAT SERPL-MCNC: 1.25 MG/DL (ref 0.5–0.9)
GFR AFRICAN AMERICAN: 51 ML/MIN
GFR NON-AFRICAN AMERICAN: 42 ML/MIN
GFR SERPL CREATININE-BSD FRML MDRD: ABNORMAL ML/MIN/{1.73_M2}
GFR SERPL CREATININE-BSD FRML MDRD: ABNORMAL ML/MIN/{1.73_M2}
GLUCOSE BLD-MCNC: 118 MG/DL (ref 70–99)
POTASSIUM SERPL-SCNC: 4.4 MMOL/L (ref 3.7–5.3)
SODIUM BLD-SCNC: 145 MMOL/L (ref 135–144)

## 2020-04-14 PROCEDURE — 80048 BASIC METABOLIC PNL TOTAL CA: CPT

## 2020-04-15 LAB
ANION GAP SERPL CALCULATED.3IONS-SCNC: 16 MMOL/L (ref 9–17)
BUN BLDV-MCNC: 55 MG/DL (ref 8–23)
BUN/CREAT BLD: 52 (ref 9–20)
CALCIUM SERPL-MCNC: 9.8 MG/DL (ref 8.6–10.4)
CHLORIDE BLD-SCNC: 102 MMOL/L (ref 98–107)
CO2: 22 MMOL/L (ref 20–31)
CREAT SERPL-MCNC: 1.06 MG/DL (ref 0.5–0.9)
CULTURE: NORMAL
DIRECT EXAM: NORMAL
GFR AFRICAN AMERICAN: >60 ML/MIN
GFR NON-AFRICAN AMERICAN: 51 ML/MIN
GFR SERPL CREATININE-BSD FRML MDRD: ABNORMAL ML/MIN/{1.73_M2}
GFR SERPL CREATININE-BSD FRML MDRD: ABNORMAL ML/MIN/{1.73_M2}
GLUCOSE BLD-MCNC: 117 MG/DL (ref 70–99)
Lab: NORMAL
MAGNESIUM: 1.8 MG/DL (ref 1.6–2.6)
PHOSPHORUS: 3.1 MG/DL (ref 2.6–4.5)
PLATELET # BLD: 151 K/UL (ref 138–453)
POTASSIUM SERPL-SCNC: 3.8 MMOL/L (ref 3.7–5.3)
SODIUM BLD-SCNC: 140 MMOL/L (ref 135–144)
SPECIMEN DESCRIPTION: NORMAL

## 2020-04-15 PROCEDURE — 83735 ASSAY OF MAGNESIUM: CPT

## 2020-04-15 PROCEDURE — 80048 BASIC METABOLIC PNL TOTAL CA: CPT

## 2020-04-15 PROCEDURE — 85049 AUTOMATED PLATELET COUNT: CPT

## 2020-04-15 PROCEDURE — 84100 ASSAY OF PHOSPHORUS: CPT

## 2020-04-16 LAB
ABSOLUTE EOS #: 0.4 K/UL (ref 0–0.4)
ABSOLUTE IMMATURE GRANULOCYTE: 1.45 K/UL (ref 0–0.3)
ABSOLUTE LYMPH #: 2.38 K/UL (ref 1–4.8)
ABSOLUTE MONO #: 1.19 K/UL (ref 0.2–0.8)
ALBUMIN SERPL-MCNC: 2.9 G/DL (ref 3.5–5.2)
ALBUMIN/GLOBULIN RATIO: ABNORMAL (ref 1–2.5)
ALP BLD-CCNC: 143 U/L (ref 35–104)
ALT SERPL-CCNC: 38 U/L (ref 5–33)
ANION GAP SERPL CALCULATED.3IONS-SCNC: 16 MMOL/L (ref 9–17)
AST SERPL-CCNC: 44 U/L
BASOPHILS # BLD: 1 %
BASOPHILS ABSOLUTE: 0.13 K/UL (ref 0–0.2)
BILIRUB SERPL-MCNC: 0.27 MG/DL (ref 0.3–1.2)
BUN BLDV-MCNC: 50 MG/DL (ref 8–23)
BUN/CREAT BLD: 58 (ref 9–20)
CALCIUM SERPL-MCNC: 10.4 MG/DL (ref 8.6–10.4)
CHLORIDE BLD-SCNC: 99 MMOL/L (ref 98–107)
CO2: 23 MMOL/L (ref 20–31)
CREAT SERPL-MCNC: 0.86 MG/DL (ref 0.5–0.9)
DIFFERENTIAL TYPE: ABNORMAL
EOSINOPHILS RELATIVE PERCENT: 3 % (ref 1–4)
GFR AFRICAN AMERICAN: >60 ML/MIN
GFR NON-AFRICAN AMERICAN: >60 ML/MIN
GFR SERPL CREATININE-BSD FRML MDRD: ABNORMAL ML/MIN/{1.73_M2}
GFR SERPL CREATININE-BSD FRML MDRD: ABNORMAL ML/MIN/{1.73_M2}
GLUCOSE BLD-MCNC: 133 MG/DL (ref 70–99)
HCT VFR BLD CALC: 35.2 % (ref 36.3–47.1)
HEMOGLOBIN: 11.2 G/DL (ref 11.9–15.1)
IMMATURE GRANULOCYTES: 11 %
LYMPHOCYTES # BLD: 18 % (ref 24–44)
MAGNESIUM: 1.7 MG/DL (ref 1.6–2.6)
MCH RBC QN AUTO: 28.8 PG (ref 25.2–33.5)
MCHC RBC AUTO-ENTMCNC: 31.8 G/DL (ref 28.4–34.8)
MCV RBC AUTO: 90.5 FL (ref 82.6–102.9)
MONOCYTES # BLD: 9 % (ref 1–7)
NRBC AUTOMATED: 0.2 PER 100 WBC
PDW BLD-RTO: 15.5 % (ref 11.8–14.4)
PLATELET # BLD: 180 K/UL (ref 138–453)
PLATELET ESTIMATE: ABNORMAL
PMV BLD AUTO: ABNORMAL FL (ref 8.1–13.5)
POTASSIUM SERPL-SCNC: 3.5 MMOL/L (ref 3.7–5.3)
RBC # BLD: 3.89 M/UL (ref 3.95–5.11)
RBC # BLD: ABNORMAL 10*6/UL
SEG NEUTROPHILS: 58 % (ref 36–66)
SEGMENTED NEUTROPHILS ABSOLUTE COUNT: 7.65 K/UL (ref 1.8–7.7)
SODIUM BLD-SCNC: 138 MMOL/L (ref 135–144)
TOTAL PROTEIN: 7.2 G/DL (ref 6.4–8.3)
WBC # BLD: 13.2 K/UL (ref 3.5–11.3)
WBC # BLD: ABNORMAL 10*3/UL

## 2020-04-16 PROCEDURE — 83735 ASSAY OF MAGNESIUM: CPT

## 2020-04-16 PROCEDURE — 85025 COMPLETE CBC W/AUTO DIFF WBC: CPT

## 2020-04-16 PROCEDURE — 80053 COMPREHEN METABOLIC PANEL: CPT

## 2020-04-17 LAB
ANION GAP SERPL CALCULATED.3IONS-SCNC: 15 MMOL/L (ref 9–17)
BUN BLDV-MCNC: 50 MG/DL (ref 8–23)
BUN/CREAT BLD: 72 (ref 9–20)
CALCIUM SERPL-MCNC: 10.5 MG/DL (ref 8.6–10.4)
CHLORIDE BLD-SCNC: 96 MMOL/L (ref 98–107)
CO2: 24 MMOL/L (ref 20–31)
CREAT SERPL-MCNC: 0.69 MG/DL (ref 0.5–0.9)
CULTURE: NORMAL
CULTURE: NORMAL
GFR AFRICAN AMERICAN: >60 ML/MIN
GFR NON-AFRICAN AMERICAN: >60 ML/MIN
GFR SERPL CREATININE-BSD FRML MDRD: ABNORMAL ML/MIN/{1.73_M2}
GFR SERPL CREATININE-BSD FRML MDRD: ABNORMAL ML/MIN/{1.73_M2}
GLUCOSE BLD-MCNC: 121 MG/DL (ref 70–99)
Lab: NORMAL
Lab: NORMAL
MAGNESIUM: 2.4 MG/DL (ref 1.6–2.6)
POTASSIUM SERPL-SCNC: 3.9 MMOL/L (ref 3.7–5.3)
SODIUM BLD-SCNC: 135 MMOL/L (ref 135–144)
SPECIMEN DESCRIPTION: NORMAL
SPECIMEN DESCRIPTION: NORMAL
TRIGL SERPL-MCNC: 477 MG/DL

## 2020-04-17 PROCEDURE — 84478 ASSAY OF TRIGLYCERIDES: CPT

## 2020-04-17 PROCEDURE — 83735 ASSAY OF MAGNESIUM: CPT

## 2020-04-17 PROCEDURE — 80048 BASIC METABOLIC PNL TOTAL CA: CPT

## 2020-04-21 LAB — PLATELET # BLD: 166 K/UL (ref 138–453)

## 2020-04-21 PROCEDURE — 85049 AUTOMATED PLATELET COUNT: CPT

## 2020-04-28 PROCEDURE — 87070 CULTURE OTHR SPECIMN AEROBIC: CPT

## 2020-04-28 PROCEDURE — 87205 SMEAR GRAM STAIN: CPT

## 2020-04-29 LAB
CULTURE: ABNORMAL
DIRECT EXAM: ABNORMAL
Lab: ABNORMAL
SPECIMEN DESCRIPTION: ABNORMAL

## 2020-05-03 PROCEDURE — 87205 SMEAR GRAM STAIN: CPT

## 2020-05-03 PROCEDURE — 87186 SC STD MICRODIL/AGAR DIL: CPT

## 2020-05-03 PROCEDURE — 87077 CULTURE AEROBIC IDENTIFY: CPT

## 2020-05-03 PROCEDURE — 87070 CULTURE OTHR SPECIMN AEROBIC: CPT

## 2020-05-03 PROCEDURE — 87086 URINE CULTURE/COLONY COUNT: CPT

## 2020-05-03 PROCEDURE — 87040 BLOOD CULTURE FOR BACTERIA: CPT

## 2020-05-04 ENCOUNTER — TELEPHONE (OUTPATIENT)
Dept: BARIATRICS/WEIGHT MGMT | Age: 75
End: 2020-05-04

## 2020-05-06 LAB
CULTURE: ABNORMAL
DIRECT EXAM: ABNORMAL
Lab: ABNORMAL
Lab: ABNORMAL
SPECIMEN DESCRIPTION: ABNORMAL
SPECIMEN DESCRIPTION: ABNORMAL

## 2020-05-06 NOTE — TELEPHONE ENCOUNTER
Son called to check on previous phone inquiry about his mother who is vomiting  His call back number is [phone]

## 2020-05-07 NOTE — TELEPHONE ENCOUNTER
I spoke with [facility], they are not sending patients out of the facility at this time per the  of the company. I notified the son of this also.

## 2020-05-10 LAB
CULTURE: NORMAL
CULTURE: NORMAL
Lab: NORMAL
Lab: NORMAL
SPECIMEN DESCRIPTION: NORMAL
SPECIMEN DESCRIPTION: NORMAL

## 2020-05-12 ENCOUNTER — HOSPITAL ENCOUNTER (INPATIENT)
Age: 75
LOS: 21 days | Discharge: NURSING, REHAB OR LONG-TERM CARE | DRG: 326 | End: 2020-06-02
Attending: FAMILY MEDICINE | Admitting: FAMILY MEDICINE
Payer: MEDICARE

## 2020-05-12 PROBLEM — Z72.0 TOBACCO ABUSE: Status: ACTIVE | Noted: 2020-05-12

## 2020-05-12 PROBLEM — Z93.1 STATUS POST INSERTION OF PERCUTANEOUS ENDOSCOPIC GASTROSTOMY (PEG) TUBE (HCC): Status: ACTIVE | Noted: 2020-05-12

## 2020-05-12 PROBLEM — J96.11 CHRONIC RESPIRATORY FAILURE WITH HYPOXIA (HCC): Status: ACTIVE | Noted: 2020-05-12

## 2020-05-12 PROBLEM — Z98.890 S/P NISSEN FUNDOPLICATION (WITHOUT GASTROSTOMY TUBE) PROCEDURE: Status: ACTIVE | Noted: 2020-05-12

## 2020-05-12 PROBLEM — K56.609 BOWEL OBSTRUCTION (HCC): Status: ACTIVE | Noted: 2020-05-12

## 2020-05-12 LAB
ABSOLUTE EOS #: 0.3 K/UL (ref 0–0.4)
ABSOLUTE IMMATURE GRANULOCYTE: 0.46 K/UL (ref 0–0.3)
ABSOLUTE LYMPH #: 1.52 K/UL (ref 1–4.8)
ABSOLUTE MONO #: 0.91 K/UL (ref 0.1–0.8)
ALBUMIN SERPL-MCNC: 2.9 G/DL (ref 3.5–5.2)
ALBUMIN/GLOBULIN RATIO: 0.7 (ref 1–2.5)
ALP BLD-CCNC: 189 U/L (ref 35–104)
ALT SERPL-CCNC: 34 U/L (ref 5–33)
ANION GAP SERPL CALCULATED.3IONS-SCNC: 15 MMOL/L (ref 9–17)
AST SERPL-CCNC: 24 U/L
BASOPHILS # BLD: 0 % (ref 0–2)
BASOPHILS ABSOLUTE: 0 K/UL (ref 0–0.2)
BILIRUB SERPL-MCNC: 0.22 MG/DL (ref 0.3–1.2)
BILIRUBIN DIRECT: 0.09 MG/DL
BILIRUBIN, INDIRECT: 0.13 MG/DL (ref 0–1)
BUN BLDV-MCNC: 23 MG/DL (ref 8–23)
BUN/CREAT BLD: ABNORMAL (ref 9–20)
CALCIUM SERPL-MCNC: 10.4 MG/DL (ref 8.6–10.4)
CHLORIDE BLD-SCNC: 101 MMOL/L (ref 98–107)
CO2: 22 MMOL/L (ref 20–31)
CREAT SERPL-MCNC: 0.78 MG/DL (ref 0.5–0.9)
DIFFERENTIAL TYPE: ABNORMAL
EOSINOPHILS RELATIVE PERCENT: 2 % (ref 1–4)
GFR AFRICAN AMERICAN: >60 ML/MIN
GFR NON-AFRICAN AMERICAN: >60 ML/MIN
GFR SERPL CREATININE-BSD FRML MDRD: ABNORMAL ML/MIN/{1.73_M2}
GFR SERPL CREATININE-BSD FRML MDRD: ABNORMAL ML/MIN/{1.73_M2}
GLOBULIN: ABNORMAL G/DL (ref 1.5–3.8)
GLUCOSE BLD-MCNC: 103 MG/DL (ref 65–105)
GLUCOSE BLD-MCNC: 106 MG/DL (ref 70–99)
HCT VFR BLD CALC: 30.8 % (ref 36.3–47.1)
HEMOGLOBIN: 9 G/DL (ref 11.9–15.1)
IMMATURE GRANULOCYTES: 3 %
LYMPHOCYTES # BLD: 10 % (ref 24–44)
MCH RBC QN AUTO: 27.4 PG (ref 25.2–33.5)
MCHC RBC AUTO-ENTMCNC: 29.2 G/DL (ref 28.4–34.8)
MCV RBC AUTO: 93.6 FL (ref 82.6–102.9)
MONOCYTES # BLD: 6 % (ref 1–7)
MORPHOLOGY: ABNORMAL
NRBC AUTOMATED: 0.2 PER 100 WBC
NUCLEATED RED BLOOD CELLS: 1 PER 100 WBC
PDW BLD-RTO: 17.4 % (ref 11.8–14.4)
PLATELET # BLD: 383 K/UL (ref 138–453)
PLATELET ESTIMATE: ABNORMAL
PMV BLD AUTO: 10.6 FL (ref 8.1–13.5)
POTASSIUM SERPL-SCNC: 5.2 MMOL/L (ref 3.7–5.3)
RBC # BLD: 3.29 M/UL (ref 3.95–5.11)
RBC # BLD: ABNORMAL 10*6/UL
SEG NEUTROPHILS: 79 % (ref 36–66)
SEGMENTED NEUTROPHILS ABSOLUTE COUNT: 12.01 K/UL (ref 1.8–7.7)
SODIUM BLD-SCNC: 138 MMOL/L (ref 135–144)
TOTAL PROTEIN: 7 G/DL (ref 6.4–8.3)
WBC # BLD: 15.2 K/UL (ref 3.5–11.3)
WBC # BLD: ABNORMAL 10*3/UL

## 2020-05-12 PROCEDURE — 6360000002 HC RX W HCPCS: Performed by: FAMILY MEDICINE

## 2020-05-12 PROCEDURE — C9113 INJ PANTOPRAZOLE SODIUM, VIA: HCPCS | Performed by: NURSE PRACTITIONER

## 2020-05-12 PROCEDURE — 2060000000 HC ICU INTERMEDIATE R&B

## 2020-05-12 PROCEDURE — 2500000003 HC RX 250 WO HCPCS: Performed by: NURSE PRACTITIONER

## 2020-05-12 PROCEDURE — 36415 COLL VENOUS BLD VENIPUNCTURE: CPT

## 2020-05-12 PROCEDURE — 2580000003 HC RX 258: Performed by: NURSE PRACTITIONER

## 2020-05-12 PROCEDURE — 82947 ASSAY GLUCOSE BLOOD QUANT: CPT

## 2020-05-12 PROCEDURE — 99232 SBSQ HOSP IP/OBS MODERATE 35: CPT | Performed by: FAMILY MEDICINE

## 2020-05-12 PROCEDURE — 80048 BASIC METABOLIC PNL TOTAL CA: CPT

## 2020-05-12 PROCEDURE — 80076 HEPATIC FUNCTION PANEL: CPT

## 2020-05-12 PROCEDURE — 94761 N-INVAS EAR/PLS OXIMETRY MLT: CPT

## 2020-05-12 PROCEDURE — 6370000000 HC RX 637 (ALT 250 FOR IP): Performed by: FAMILY MEDICINE

## 2020-05-12 PROCEDURE — 6360000002 HC RX W HCPCS: Performed by: NURSE PRACTITIONER

## 2020-05-12 PROCEDURE — 85025 COMPLETE CBC W/AUTO DIFF WBC: CPT

## 2020-05-12 PROCEDURE — 2700000000 HC OXYGEN THERAPY PER DAY

## 2020-05-12 RX ORDER — SODIUM CHLORIDE 0.9 % (FLUSH) 0.9 %
10 SYRINGE (ML) INJECTION PRN
Status: DISCONTINUED | OUTPATIENT
Start: 2020-05-12 | End: 2020-06-02 | Stop reason: HOSPADM

## 2020-05-12 RX ORDER — AMITRIPTYLINE HYDROCHLORIDE 25 MG/1
25 TABLET, FILM COATED ORAL 3 TIMES DAILY
Status: DISCONTINUED | OUTPATIENT
Start: 2020-05-12 | End: 2020-06-02 | Stop reason: HOSPADM

## 2020-05-12 RX ORDER — PANTOPRAZOLE SODIUM 40 MG/10ML
40 INJECTION, POWDER, LYOPHILIZED, FOR SOLUTION INTRAVENOUS DAILY
Status: DISCONTINUED | OUTPATIENT
Start: 2020-05-12 | End: 2020-05-18

## 2020-05-12 RX ORDER — MORPHINE SULFATE 2 MG/ML
2 INJECTION, SOLUTION INTRAMUSCULAR; INTRAVENOUS EVERY 4 HOURS PRN
Status: DISCONTINUED | OUTPATIENT
Start: 2020-05-12 | End: 2020-05-13

## 2020-05-12 RX ORDER — FUROSEMIDE 10 MG/ML
20 INJECTION INTRAMUSCULAR; INTRAVENOUS DAILY
Status: DISCONTINUED | OUTPATIENT
Start: 2020-05-12 | End: 2020-05-15

## 2020-05-12 RX ORDER — LORAZEPAM 2 MG/ML
0.5 INJECTION INTRAMUSCULAR EVERY 4 HOURS PRN
Status: DISCONTINUED | OUTPATIENT
Start: 2020-05-12 | End: 2020-05-14

## 2020-05-12 RX ORDER — METOPROLOL TARTRATE 5 MG/5ML
5 INJECTION INTRAVENOUS EVERY 4 HOURS
Status: DISCONTINUED | OUTPATIENT
Start: 2020-05-12 | End: 2020-05-17

## 2020-05-12 RX ORDER — SODIUM CHLORIDE 9 MG/ML
10 INJECTION INTRAVENOUS DAILY
Status: DISCONTINUED | OUTPATIENT
Start: 2020-05-12 | End: 2020-05-18

## 2020-05-12 RX ORDER — BUSPIRONE HYDROCHLORIDE 10 MG/1
20 TABLET ORAL 3 TIMES DAILY
Status: DISCONTINUED | OUTPATIENT
Start: 2020-05-12 | End: 2020-06-02 | Stop reason: HOSPADM

## 2020-05-12 RX ORDER — ESCITALOPRAM OXALATE 10 MG/1
20 TABLET ORAL DAILY
Status: DISCONTINUED | OUTPATIENT
Start: 2020-05-12 | End: 2020-06-02 | Stop reason: HOSPADM

## 2020-05-12 RX ORDER — GABAPENTIN 300 MG/1
300 CAPSULE ORAL 3 TIMES DAILY
Status: DISCONTINUED | OUTPATIENT
Start: 2020-05-12 | End: 2020-06-02 | Stop reason: HOSPADM

## 2020-05-12 RX ORDER — SODIUM CHLORIDE 0.9 % (FLUSH) 0.9 %
10 SYRINGE (ML) INJECTION EVERY 12 HOURS SCHEDULED
Status: DISCONTINUED | OUTPATIENT
Start: 2020-05-12 | End: 2020-06-02 | Stop reason: HOSPADM

## 2020-05-12 RX ORDER — METOCLOPRAMIDE HYDROCHLORIDE 5 MG/ML
10 INJECTION INTRAMUSCULAR; INTRAVENOUS EVERY 6 HOURS
Status: DISCONTINUED | OUTPATIENT
Start: 2020-05-12 | End: 2020-06-02 | Stop reason: HOSPADM

## 2020-05-12 RX ORDER — POTASSIUM CHLORIDE 20 MEQ/1
40 TABLET, EXTENDED RELEASE ORAL PRN
Status: DISCONTINUED | OUTPATIENT
Start: 2020-05-12 | End: 2020-06-02 | Stop reason: HOSPADM

## 2020-05-12 RX ORDER — LORAZEPAM 2 MG/ML
0.5 INJECTION INTRAMUSCULAR EVERY 6 HOURS
Status: DISCONTINUED | OUTPATIENT
Start: 2020-05-12 | End: 2020-05-12

## 2020-05-12 RX ORDER — DEXTROSE AND SODIUM CHLORIDE 5; .45 G/100ML; G/100ML
INJECTION, SOLUTION INTRAVENOUS CONTINUOUS
Status: DISCONTINUED | OUTPATIENT
Start: 2020-05-12 | End: 2020-05-30

## 2020-05-12 RX ORDER — MAGNESIUM SULFATE 1 G/100ML
1 INJECTION INTRAVENOUS PRN
Status: DISCONTINUED | OUTPATIENT
Start: 2020-05-12 | End: 2020-06-02 | Stop reason: HOSPADM

## 2020-05-12 RX ORDER — NICOTINE 21 MG/24HR
1 PATCH, TRANSDERMAL 24 HOURS TRANSDERMAL DAILY PRN
Status: DISCONTINUED | OUTPATIENT
Start: 2020-05-12 | End: 2020-06-02 | Stop reason: HOSPADM

## 2020-05-12 RX ORDER — LORAZEPAM 2 MG/ML
0.5 INJECTION INTRAMUSCULAR EVERY 6 HOURS PRN
Status: CANCELLED | OUTPATIENT
Start: 2020-05-12

## 2020-05-12 RX ORDER — PANTOPRAZOLE SODIUM 40 MG/10ML
40 INJECTION, POWDER, LYOPHILIZED, FOR SOLUTION INTRAVENOUS DAILY
Status: DISCONTINUED | OUTPATIENT
Start: 2020-05-12 | End: 2020-05-12

## 2020-05-12 RX ORDER — POTASSIUM CHLORIDE 7.45 MG/ML
10 INJECTION INTRAVENOUS PRN
Status: DISCONTINUED | OUTPATIENT
Start: 2020-05-12 | End: 2020-06-02 | Stop reason: HOSPADM

## 2020-05-12 RX ADMIN — GABAPENTIN 300 MG: 300 CAPSULE ORAL at 20:12

## 2020-05-12 RX ADMIN — METOCLOPRAMIDE 10 MG: 5 INJECTION, SOLUTION INTRAMUSCULAR; INTRAVENOUS at 17:29

## 2020-05-12 RX ADMIN — Medication 10 ML: at 17:47

## 2020-05-12 RX ADMIN — AMITRIPTYLINE HYDROCHLORIDE 25 MG: 25 TABLET, FILM COATED ORAL at 20:12

## 2020-05-12 RX ADMIN — DEXTROSE AND SODIUM CHLORIDE: 5; 450 INJECTION, SOLUTION INTRAVENOUS at 17:27

## 2020-05-12 RX ADMIN — MORPHINE SULFATE 2 MG: 2 INJECTION, SOLUTION INTRAMUSCULAR; INTRAVENOUS at 17:27

## 2020-05-12 RX ADMIN — PANTOPRAZOLE SODIUM 40 MG: 40 INJECTION, POWDER, LYOPHILIZED, FOR SOLUTION INTRAVENOUS at 17:30

## 2020-05-12 RX ADMIN — METOPROLOL TARTRATE 5 MG: 1 INJECTION, SOLUTION INTRAVENOUS at 23:58

## 2020-05-12 RX ADMIN — BUSPIRONE HYDROCHLORIDE 20 MG: 10 TABLET ORAL at 20:12

## 2020-05-12 RX ADMIN — SODIUM CHLORIDE, PRESERVATIVE FREE 10 ML: 5 INJECTION INTRAVENOUS at 20:15

## 2020-05-12 RX ADMIN — ENOXAPARIN SODIUM 40 MG: 40 INJECTION SUBCUTANEOUS at 17:26

## 2020-05-12 RX ADMIN — LORAZEPAM 0.5 MG: 2 INJECTION INTRAMUSCULAR at 20:12

## 2020-05-12 RX ADMIN — ESCITALOPRAM OXALATE 20 MG: 10 TABLET ORAL at 20:12

## 2020-05-12 RX ADMIN — METOPROLOL TARTRATE 5 MG: 1 INJECTION, SOLUTION INTRAVENOUS at 17:26

## 2020-05-12 RX ADMIN — MORPHINE SULFATE 2 MG: 2 INJECTION, SOLUTION INTRAMUSCULAR; INTRAVENOUS at 23:59

## 2020-05-12 RX ADMIN — METOCLOPRAMIDE 10 MG: 5 INJECTION, SOLUTION INTRAMUSCULAR; INTRAVENOUS at 23:59

## 2020-05-12 RX ADMIN — FUROSEMIDE 20 MG: 10 INJECTION, SOLUTION INTRAMUSCULAR; INTRAVENOUS at 17:29

## 2020-05-12 ASSESSMENT — PAIN DESCRIPTION - PROGRESSION: CLINICAL_PROGRESSION: NOT CHANGED

## 2020-05-12 ASSESSMENT — PAIN SCALES - GENERAL
PAINLEVEL_OUTOF10: 7
PAINLEVEL_OUTOF10: 0
PAINLEVEL_OUTOF10: 7

## 2020-05-12 ASSESSMENT — PAIN DESCRIPTION - LOCATION: LOCATION: ABDOMEN

## 2020-05-12 ASSESSMENT — PAIN DESCRIPTION - FREQUENCY: FREQUENCY: CONTINUOUS

## 2020-05-12 ASSESSMENT — PAIN - FUNCTIONAL ASSESSMENT: PAIN_FUNCTIONAL_ASSESSMENT: PREVENTS OR INTERFERES SOME ACTIVE ACTIVITIES AND ADLS

## 2020-05-12 ASSESSMENT — PAIN DESCRIPTION - DESCRIPTORS: DESCRIPTORS: ACHING

## 2020-05-12 ASSESSMENT — PAIN DESCRIPTION - PAIN TYPE: TYPE: ACUTE PAIN

## 2020-05-12 ASSESSMENT — PAIN DESCRIPTION - ONSET: ONSET: ON-GOING

## 2020-05-12 NOTE — PROGRESS NOTES
Patient placed on cool aerosol 28% 5L via trach mask. Patient currently wearing Passy [place] valve.  Trach size: 6.0 Shiley

## 2020-05-12 NOTE — H&P
[facility]    HISTORY AND PHYSICAL EXAMINATION            Date:   5/12/2020  Patient name:  Marcos Maza  Date of admission:  5/12/2020  3:19 PM  MRN:   [id number]  Account:  [de-identified]  YOB: 1945  PCP:    Kierra Arthur MD  Room:   4266/8910-45  Code Status:    Full Code    Chief Complaint:     Abdominal pain/ nausea and vomiting    History Obtained From:     patient, electronic medical record    History of Present Illness:     Marcos Maza is a 76 y.o. Non-/non  female who presents with No chief complaint on file. and is admitted to the hospital for the management of SBO  Patient transferred to our facility from [facility] with possibility of bowel obstruction, 2 months ago she had prolonged extensive hospital stay, at the end of February had a Nissen fundoplication with possible aspiration issues and was admitted and stayed in the hospital for 37 days during that time she was intubated twice had hematemesis and had EGD that showed gastric ulcer and was seen by a specialist and after long course she was successfully transitioned to a trach and PEG and discharged to [facility] on 4/6. This time the patient has been having increasing abdominal pain nausea and vomiting over that has been worsening since she was discharged over at [facility]  Patient had ongoing issues since then, had SBO that resolved , since then she has an issue  with tolerating TF,  a PICC line placed on 4/25 at [facility] and was startd TPN , given she had persistent N/V, TF held and  PEG tube has been down to gravity for the last couple of days. She also has been treated for UTI and finishing a course of Zyvox ordered by ID.     On my evaluation patient seems so very anxious , has abdominal pain , nausea, She denies any chest pain, sob,  fever or chills    Past Medical History:     Past Medical History:   Diagnosis Date    Anxiety     Arthritis     Back calcium carbonate (TUMS) 500 MG chewable tablet Take 1 tablet by mouth 3 times daily as needed for Heartburn    Historical Provider, MD   gabapentin (NEURONTIN) 300 MG capsule Take 300 mg by mouth 3 times daily. Historical Provider, MD   simvastatin (ZOCOR) 40 MG tablet Take 40 mg by mouth nightly. Historical Provider, MD        Allergies:     Prednisone; Compazine [prochlorperazine maleate]; Penicillin v potassium; and Penicillins    Social History:     Tobacco:    reports that she has been smoking cigarettes. She has a 50.00 pack-year smoking history. She has never used smokeless tobacco.  Alcohol:      reports no history of alcohol use. Drug Use:  reports no history of drug use. Family History:     Family History   Problem Relation Age of Onset    Cancer Mother         uterine    Heart Attack Mother     Heart Attack Father     Other Maternal Grandfather         foot gangrene    Other Paternal Grandmother         bleeding ulcers    Other Paternal Grandfather         lung cancer       Review of Systems:     Positive and Negative as described in HPI.     CONSTITUTIONAL:  negative for fevers, chills, sweats, fatigue, weight loss  HEENT:  negative for vision, hearing changes, runny nose, throat pain  RESPIRATORY:  negative for shortness of breath, cough, congestion, wheezing  CARDIOVASCULAR:  negative for chest pain, palpitations  GASTROINTESTINAL:  negative for nausea, vomiting, diarrhea, constipation, change in bowel habits, abdominal pain   GENITOURINARY:  negative for difficulty of urination, burning with urination, frequency   INTEGUMENT:  negative for rash, skin lesions, easy bruising   HEMATOLOGIC/LYMPHATIC:  negative for swelling/edema   ALLERGIC/IMMUNOLOGIC:  negative for urticaria , itching  ENDOCRINE:  negative increase in drinking, increase in urination, hot or cold intolerance  MUSCULOSKELETAL:  negative joint pains, muscle aches, swelling of joints  NEUROLOGICAL:  negative for headaches, dizziness, lightheadedness, numbness, pain, tingling extremities  BEHAVIOR/PSYCH:  +ve for severe depression and  anxiety    Physical Exam:   BP (!) 131/59   Pulse 97   Temp 99.3 °F (37.4 °C) (Temporal)   Resp 29   SpO2 96%   Temp (24hrs), Av.3 °F (37.4 °C), Min:99.3 °F (37.4 °C), Max:99.3 °F (37.4 °C)    Recent Labs     20  1631   POCGLU 103     No intake or output data in the 24 hours ending 20 1727    General Appearance: alert, sick appearing, seems very anxious  Mental status: oriented to person, place, and time  Head: normocephalic, atraumatic  Eye: no icterus, redness, pupils equal and reactive, extraocular eye movements intact, conjunctiva clear  Ear: normal external ear, no discharge, hearing intact  Nose: no drainage noted  Mouth: mucous membranes moist  Neck: supple, no carotid bruits, thyroid not palpable  Lungs: Bilateral diminished  air entry, no  rales or rhonchi, normal effort  Cardiovascular: normal rate, regular rhythm, no murmur, gallop, rub  Abdomen: Soft, tender to lower abdomen , PEG tube noted,  nondistended, normal bowel sounds, no hepatomegaly or splenomegaly  Neurologic: There are no new focal motor or sensory deficits, normal muscle tone and bulk, no abnormal sensation, normal speech, cranial nerves II through XII grossly intact  Skin: No gross lesions, rashes, bruising or bleeding on exposed skin area  Extremities: peripheral pulses palpable, no pedal edema or calf pain with palpation  Psych: Very anxious     Investigations:      Laboratory Testing:  Recent Results (from the past 24 hour(s))   POC Glucose Fingerstick    Collection Time: 20  4:31 PM   Result Value Ref Range    POC Glucose 103 65 - 105 mg/dL       Imaging/Diagnostics:  No results found.     Assessment :      Hospital Problems           Last Modified POA    * (Principal) Bowel obstruction ([address] 2020 Yes    MVP (mitral valve prolapse) 2020 Yes    Centrilobular emphysema ([address] 2020 Yes

## 2020-05-13 ENCOUNTER — APPOINTMENT (OUTPATIENT)
Dept: GENERAL RADIOLOGY | Age: 75
DRG: 326 | End: 2020-05-13
Attending: FAMILY MEDICINE
Payer: MEDICARE

## 2020-05-13 LAB
ABSOLUTE EOS #: 0.16 K/UL (ref 0–0.4)
ABSOLUTE IMMATURE GRANULOCYTE: 0.16 K/UL (ref 0–0.3)
ABSOLUTE LYMPH #: 1.55 K/UL (ref 1–4.8)
ABSOLUTE MONO #: 1.09 K/UL (ref 0.1–0.8)
BASOPHILS # BLD: 0 % (ref 0–2)
BASOPHILS ABSOLUTE: 0 K/UL (ref 0–0.2)
C-REACTIVE PROTEIN: 115.1 MG/L (ref 0–5)
DIFFERENTIAL TYPE: ABNORMAL
EOSINOPHILS RELATIVE PERCENT: 1 % (ref 1–4)
GLUCOSE BLD-MCNC: 127 MG/DL (ref 65–105)
HCT VFR BLD CALC: 30.1 % (ref 36.3–47.1)
HEMOGLOBIN: 9 G/DL (ref 11.9–15.1)
IMMATURE GRANULOCYTES: 1 %
INR BLD: 1.1
LYMPHOCYTES # BLD: 10 % (ref 24–44)
MCH RBC QN AUTO: 26.9 PG (ref 25.2–33.5)
MCHC RBC AUTO-ENTMCNC: 29.9 G/DL (ref 28.4–34.8)
MCV RBC AUTO: 90.1 FL (ref 82.6–102.9)
MONOCYTES # BLD: 7 % (ref 1–7)
MORPHOLOGY: ABNORMAL
NRBC AUTOMATED: 0 PER 100 WBC
PDW BLD-RTO: 17.7 % (ref 11.8–14.4)
PLATELET # BLD: 386 K/UL (ref 138–453)
PLATELET ESTIMATE: ABNORMAL
PMV BLD AUTO: 10.5 FL (ref 8.1–13.5)
PROCALCITONIN: 0.16 NG/ML
PROTHROMBIN TIME: 11.1 SEC (ref 9–12)
RBC # BLD: 3.34 M/UL (ref 3.95–5.11)
RBC # BLD: ABNORMAL 10*6/UL
SEG NEUTROPHILS: 81 % (ref 36–66)
SEGMENTED NEUTROPHILS ABSOLUTE COUNT: 12.54 K/UL (ref 1.8–7.7)
WBC # BLD: 15.5 K/UL (ref 3.5–11.3)
WBC # BLD: ABNORMAL 10*3/UL

## 2020-05-13 PROCEDURE — APPSS60 APP SPLIT SHARED TIME 46-60 MINUTES: Performed by: NURSE PRACTITIONER

## 2020-05-13 PROCEDURE — 71045 X-RAY EXAM CHEST 1 VIEW: CPT

## 2020-05-13 PROCEDURE — 87077 CULTURE AEROBIC IDENTIFY: CPT

## 2020-05-13 PROCEDURE — 97530 THERAPEUTIC ACTIVITIES: CPT

## 2020-05-13 PROCEDURE — 99232 SBSQ HOSP IP/OBS MODERATE 35: CPT | Performed by: FAMILY MEDICINE

## 2020-05-13 PROCEDURE — 6370000000 HC RX 637 (ALT 250 FOR IP): Performed by: NURSE PRACTITIONER

## 2020-05-13 PROCEDURE — 2500000003 HC RX 250 WO HCPCS: Performed by: NURSE PRACTITIONER

## 2020-05-13 PROCEDURE — 84145 PROCALCITONIN (PCT): CPT

## 2020-05-13 PROCEDURE — 97166 OT EVAL MOD COMPLEX 45 MIN: CPT

## 2020-05-13 PROCEDURE — 97535 SELF CARE MNGMENT TRAINING: CPT

## 2020-05-13 PROCEDURE — 97162 PT EVAL MOD COMPLEX 30 MIN: CPT

## 2020-05-13 PROCEDURE — 2500000003 HC RX 250 WO HCPCS: Performed by: FAMILY MEDICINE

## 2020-05-13 PROCEDURE — 6370000000 HC RX 637 (ALT 250 FOR IP): Performed by: FAMILY MEDICINE

## 2020-05-13 PROCEDURE — 6360000002 HC RX W HCPCS: Performed by: NURSE PRACTITIONER

## 2020-05-13 PROCEDURE — 36415 COLL VENOUS BLD VENIPUNCTURE: CPT

## 2020-05-13 PROCEDURE — C9113 INJ PANTOPRAZOLE SODIUM, VIA: HCPCS | Performed by: NURSE PRACTITIONER

## 2020-05-13 PROCEDURE — 82947 ASSAY GLUCOSE BLOOD QUANT: CPT

## 2020-05-13 PROCEDURE — 94761 N-INVAS EAR/PLS OXIMETRY MLT: CPT

## 2020-05-13 PROCEDURE — 2580000003 HC RX 258: Performed by: NURSE PRACTITIONER

## 2020-05-13 PROCEDURE — 2060000000 HC ICU INTERMEDIATE R&B

## 2020-05-13 PROCEDURE — 6360000002 HC RX W HCPCS: Performed by: FAMILY MEDICINE

## 2020-05-13 PROCEDURE — 87086 URINE CULTURE/COLONY COUNT: CPT

## 2020-05-13 PROCEDURE — 2700000000 HC OXYGEN THERAPY PER DAY

## 2020-05-13 PROCEDURE — 86140 C-REACTIVE PROTEIN: CPT

## 2020-05-13 PROCEDURE — 87186 SC STD MICRODIL/AGAR DIL: CPT

## 2020-05-13 PROCEDURE — 99222 1ST HOSP IP/OBS MODERATE 55: CPT | Performed by: INTERNAL MEDICINE

## 2020-05-13 PROCEDURE — 85610 PROTHROMBIN TIME: CPT

## 2020-05-13 PROCEDURE — 85025 COMPLETE CBC W/AUTO DIFF WBC: CPT

## 2020-05-13 RX ORDER — LORAZEPAM 2 MG/ML
1 INJECTION INTRAMUSCULAR
Status: COMPLETED | OUTPATIENT
Start: 2020-05-13 | End: 2020-05-13

## 2020-05-13 RX ORDER — MORPHINE SULFATE 2 MG/ML
2 INJECTION, SOLUTION INTRAMUSCULAR; INTRAVENOUS
Status: DISCONTINUED | OUTPATIENT
Start: 2020-05-13 | End: 2020-05-23

## 2020-05-13 RX ORDER — QUETIAPINE FUMARATE 100 MG/1
100 TABLET, FILM COATED ORAL NIGHTLY
Status: DISCONTINUED | OUTPATIENT
Start: 2020-05-13 | End: 2020-06-02 | Stop reason: HOSPADM

## 2020-05-13 RX ORDER — ACETAMINOPHEN 325 MG/1
650 TABLET ORAL EVERY 4 HOURS PRN
Status: DISCONTINUED | OUTPATIENT
Start: 2020-05-13 | End: 2020-06-02 | Stop reason: HOSPADM

## 2020-05-13 RX ORDER — FUROSEMIDE 10 MG/ML
INJECTION INTRAMUSCULAR; INTRAVENOUS
Status: DISPENSED
Start: 2020-05-13 | End: 2020-05-13

## 2020-05-13 RX ORDER — CLONAZEPAM 0.5 MG/1
0.5 TABLET ORAL 2 TIMES DAILY
Status: DISCONTINUED | OUTPATIENT
Start: 2020-05-13 | End: 2020-05-31

## 2020-05-13 RX ORDER — TAMSULOSIN HYDROCHLORIDE 0.4 MG/1
0.4 CAPSULE ORAL DAILY
Status: DISCONTINUED | OUTPATIENT
Start: 2020-05-13 | End: 2020-06-02 | Stop reason: HOSPADM

## 2020-05-13 RX ORDER — TRAZODONE HYDROCHLORIDE 100 MG/1
100 TABLET ORAL NIGHTLY
Status: DISCONTINUED | OUTPATIENT
Start: 2020-05-13 | End: 2020-06-02 | Stop reason: HOSPADM

## 2020-05-13 RX ORDER — LORAZEPAM 2 MG/ML
0.5 INJECTION INTRAMUSCULAR ONCE
Status: COMPLETED | OUTPATIENT
Start: 2020-05-13 | End: 2020-05-13

## 2020-05-13 RX ADMIN — BUSPIRONE HYDROCHLORIDE 20 MG: 10 TABLET ORAL at 08:21

## 2020-05-13 RX ADMIN — ACETAMINOPHEN 650 MG: 325 TABLET ORAL at 10:54

## 2020-05-13 RX ADMIN — FUROSEMIDE 20 MG: 10 INJECTION, SOLUTION INTRAMUSCULAR; INTRAVENOUS at 09:18

## 2020-05-13 RX ADMIN — METOPROLOL TARTRATE 5 MG: 1 INJECTION, SOLUTION INTRAVENOUS at 08:22

## 2020-05-13 RX ADMIN — METOPROLOL TARTRATE 5 MG: 1 INJECTION, SOLUTION INTRAVENOUS at 13:51

## 2020-05-13 RX ADMIN — AMITRIPTYLINE HYDROCHLORIDE 25 MG: 25 TABLET, FILM COATED ORAL at 08:21

## 2020-05-13 RX ADMIN — POTASSIUM CHLORIDE: 2 INJECTION, SOLUTION, CONCENTRATE INTRAVENOUS at 18:08

## 2020-05-13 RX ADMIN — MORPHINE SULFATE 2 MG: 2 INJECTION, SOLUTION INTRAMUSCULAR; INTRAVENOUS at 21:01

## 2020-05-13 RX ADMIN — METOCLOPRAMIDE 10 MG: 5 INJECTION, SOLUTION INTRAMUSCULAR; INTRAVENOUS at 09:25

## 2020-05-13 RX ADMIN — LORAZEPAM 0.5 MG: 2 INJECTION INTRAMUSCULAR at 01:37

## 2020-05-13 RX ADMIN — DEXTROSE AND SODIUM CHLORIDE: 5; 450 INJECTION, SOLUTION INTRAVENOUS at 13:55

## 2020-05-13 RX ADMIN — LORAZEPAM 1 MG: 2 INJECTION INTRAMUSCULAR; INTRAVENOUS at 08:31

## 2020-05-13 RX ADMIN — SODIUM CHLORIDE, PRESERVATIVE FREE 10 ML: 5 INJECTION INTRAVENOUS at 23:03

## 2020-05-13 RX ADMIN — ESCITALOPRAM OXALATE 20 MG: 10 TABLET ORAL at 08:19

## 2020-05-13 RX ADMIN — GABAPENTIN 300 MG: 300 CAPSULE ORAL at 23:03

## 2020-05-13 RX ADMIN — I.V. FAT EMULSION 250 ML: 20 EMULSION INTRAVENOUS at 17:40

## 2020-05-13 RX ADMIN — PANTOPRAZOLE SODIUM 40 MG: 40 INJECTION, POWDER, LYOPHILIZED, FOR SOLUTION INTRAVENOUS at 08:26

## 2020-05-13 RX ADMIN — MORPHINE SULFATE 2 MG: 2 INJECTION, SOLUTION INTRAMUSCULAR; INTRAVENOUS at 03:49

## 2020-05-13 RX ADMIN — TRAZODONE HYDROCHLORIDE 100 MG: 100 TABLET ORAL at 21:01

## 2020-05-13 RX ADMIN — BUSPIRONE HYDROCHLORIDE 20 MG: 10 TABLET ORAL at 21:01

## 2020-05-13 RX ADMIN — DEXTROSE AND SODIUM CHLORIDE: 5; 450 INJECTION, SOLUTION INTRAVENOUS at 04:05

## 2020-05-13 RX ADMIN — GABAPENTIN 300 MG: 300 CAPSULE ORAL at 08:19

## 2020-05-13 RX ADMIN — SODIUM CHLORIDE, PRESERVATIVE FREE 10 ML: 5 INJECTION INTRAVENOUS at 09:20

## 2020-05-13 RX ADMIN — BUSPIRONE HYDROCHLORIDE 20 MG: 10 TABLET ORAL at 13:49

## 2020-05-13 RX ADMIN — GABAPENTIN 300 MG: 300 CAPSULE ORAL at 13:50

## 2020-05-13 RX ADMIN — METOCLOPRAMIDE 10 MG: 5 INJECTION, SOLUTION INTRAMUSCULAR; INTRAVENOUS at 21:01

## 2020-05-13 RX ADMIN — AMITRIPTYLINE HYDROCHLORIDE 25 MG: 25 TABLET, FILM COATED ORAL at 13:49

## 2020-05-13 RX ADMIN — MORPHINE SULFATE 2 MG: 2 INJECTION, SOLUTION INTRAMUSCULAR; INTRAVENOUS at 10:38

## 2020-05-13 RX ADMIN — METOCLOPRAMIDE 10 MG: 5 INJECTION, SOLUTION INTRAMUSCULAR; INTRAVENOUS at 03:48

## 2020-05-13 RX ADMIN — QUETIAPINE FUMARATE 100 MG: 100 TABLET ORAL at 21:01

## 2020-05-13 RX ADMIN — ENOXAPARIN SODIUM 40 MG: 40 INJECTION SUBCUTANEOUS at 08:25

## 2020-05-13 RX ADMIN — LORAZEPAM 0.5 MG: 2 INJECTION INTRAMUSCULAR at 03:07

## 2020-05-13 RX ADMIN — METOCLOPRAMIDE 10 MG: 5 INJECTION, SOLUTION INTRAMUSCULAR; INTRAVENOUS at 17:50

## 2020-05-13 RX ADMIN — AMITRIPTYLINE HYDROCHLORIDE 25 MG: 25 TABLET, FILM COATED ORAL at 21:01

## 2020-05-13 RX ADMIN — METOPROLOL TARTRATE 5 MG: 1 INJECTION, SOLUTION INTRAVENOUS at 22:58

## 2020-05-13 RX ADMIN — CLONAZEPAM 0.5 MG: 0.5 TABLET ORAL at 08:33

## 2020-05-13 RX ADMIN — CLONAZEPAM 0.5 MG: 0.5 TABLET ORAL at 21:01

## 2020-05-13 ASSESSMENT — PAIN DESCRIPTION - FREQUENCY
FREQUENCY: CONTINUOUS
FREQUENCY: CONTINUOUS

## 2020-05-13 ASSESSMENT — ENCOUNTER SYMPTOMS
ABDOMINAL DISTENTION: 1
SHORTNESS OF BREATH: 0
CONSTIPATION: 0
COUGH: 0
WHEEZING: 0
NAUSEA: 0
DIARRHEA: 0
SINUS PAIN: 0
ABDOMINAL PAIN: 1
VOMITING: 0
SINUS PRESSURE: 0

## 2020-05-13 ASSESSMENT — PAIN DESCRIPTION - ORIENTATION
ORIENTATION: LOWER

## 2020-05-13 ASSESSMENT — PAIN DESCRIPTION - PAIN TYPE
TYPE: ACUTE PAIN

## 2020-05-13 ASSESSMENT — PAIN SCALES - GENERAL
PAINLEVEL_OUTOF10: 8
PAINLEVEL_OUTOF10: 8
PAINLEVEL_OUTOF10: 9
PAINLEVEL_OUTOF10: 7
PAINLEVEL_OUTOF10: 7
PAINLEVEL_OUTOF10: 4

## 2020-05-13 ASSESSMENT — PAIN DESCRIPTION - ONSET: ONSET: ON-GOING

## 2020-05-13 ASSESSMENT — PAIN DESCRIPTION - LOCATION
LOCATION: ABDOMEN

## 2020-05-13 ASSESSMENT — PAIN DESCRIPTION - DESCRIPTORS: DESCRIPTORS: CONSTANT;ACHING

## 2020-05-13 ASSESSMENT — PAIN - FUNCTIONAL ASSESSMENT: PAIN_FUNCTIONAL_ASSESSMENT: PREVENTS OR INTERFERES SOME ACTIVE ACTIVITIES AND ADLS

## 2020-05-13 ASSESSMENT — PAIN DESCRIPTION - PROGRESSION
CLINICAL_PROGRESSION: NOT CHANGED
CLINICAL_PROGRESSION: NOT CHANGED

## 2020-05-13 NOTE — PROGRESS NOTES
Orthoses?: No  Position Activity Restriction  Other position/activity restrictions: Up with assistance   Vision/Hearing  Subjective  General  Patient assessed for rehabilitation services?: Yes  Response To Previous Treatment: Not applicable  Family / Caregiver Present: No  Follows Commands: Within Functional Limits  Pain Screening  Patient Currently in Pain: Yes  Pain Assessment  Pain Assessment: 0-10  Pain Level: 8  Pain Location: Abdomen  Vital Signs  Patient Currently in Pain: Yes     Orientation  Orientation  Overall Orientation Status: Within Normal Limits  Social/Functional History  Social/Functional History  Lives With: Alone  Type of Home: House  Home Layout: One level  Home Access: Stairs to enter with rails  Entrance Stairs - Number of Steps: 3 CHITRA   Entrance Stairs - Rails: Right  Bathroom Shower/Tub: Walk-in shower  Bathroom Toilet: Standard  Bathroom Equipment: Shower chair  Home Equipment: (none )  ADL Assistance: Needs assistance  Homemaking Assistance: Needs assistance  Homemaking Responsibilities: Yes  Meal Prep Responsibility: Primary(granddaughters completed )  Laundry Responsibility: Primary  Cleaning Responsibility: Secondary(granddaughters were completing home care )  Shopping Responsibility: Secondary(Pt reports son had groceries delievered to home )  Ambulation Assistance: Needs assistance  Transfer Assistance: Needs assistance  Active : No  Patient's  Info: son drives  Occupation: Retired  Leisure & Hobbies: play on [address], word searches, watch TV, go out to lunch   Additional Comments: Pt was at [facility] prior to admission, plans to return.  Pt reports pt was IND hospital admission and rehab stay, ~2 months ago   Cognition      Objective     AROM RLE (degrees)  RLE AROM: WFL  AROM LLE (degrees)  LLE AROM : WFL  AROM RUE (degrees)  RUE AROM : WFL  AROM LUE (degrees)  LUE AROM : WFL  Strength RLE  Strength RLE: WFL  Strength LLE  Strength LLE: WFL  Strength RUE  Strength RUE:

## 2020-05-13 NOTE — PROGRESS NOTES
[facility]    Progress Note    5/13/2020    7:53 AM    Name:   Enma Gama  MRN:     [id number]     Acct:      [de-identified]   Room:   [address] [place] [address] Day:  1  Admit Date:  5/12/2020  3:19 PM    PCP:   Tam Gale MD  Code Status:  Full Code    Subjective:     C/C: intractable nausea and vomiting  Interval History Status: not changed. Patient seen and evaluated in room sitting in bed in no acute distress. She is anxious at baseline. Vitals are stable. Awaiting general surgery's input for further diagnostic imaging. Continue n.p.o. with IV fluids for now. PEG tube to gravity. Only p.o. intake should be oral medication at this time    Brief History:     Per records:    Enma Gama is a 76 y.o. Non-/non  female who presents with No chief complaint on file. and is admitted to the hospital for the management of SBO  Patient transferred to our facility from [facility] with possibility of bowel obstruction, 2 months ago she had prolonged extensive hospital stay, at the end of February had a Nissen fundoplication with possible aspiration issues and was admitted and stayed in the hospital for 37 days during that time she was intubated twice had hematemesis and had EGD that showed gastric ulcer and was seen by a specialist and after long course she was successfully transitioned to a trach and PEG and discharged to [facility] on 4/6. This time the patient has been having increasing abdominal pain nausea and vomiting over that has been worsening since she was discharged over at [facility]  Patient had ongoing issues since then, had SBO that resolved , since then she has an issue  with tolerating TF,  a PICC line placed on 4/25 at [facility] and was startd TPN , given she had persistent N/V, TF held and  PEG tube has been down to gravity for the last couple of days.   She also has been treated for UTI and finishing a course of Zyvox ordered 34*   ALKPHOS  --  189*   BILITOT  --  0.22*   BILIDIR  --  0.09   POCGLU 103  --      ABG:  Lab Results   Component Value Date    POCPH 7.439 04/06/2020    POCPCO2 41.9 04/06/2020    POCPO2 125.9 04/06/2020    POCHCO3 28.4 04/06/2020    NBEA NOT REPORTED 04/06/2020    PBEA 4 04/06/2020    PDZ4AQW 30 04/06/2020    PRZK1EPZ 99 04/06/2020    FIO2 40.0 04/06/2020     Lab Results   Component Value Date/Time    SPECIAL NOT REPORTED 05/03/2020 09:20 PM     Lab Results   Component Value Date/Time    CULTURE NO GROWTH 6 DAYS 05/03/2020 09:20 PM       Radiology:  No results found. Physical Examination:        Physical Exam  Vitals signs and nursing note reviewed. Constitutional:       General: She is not in acute distress. Appearance: Normal appearance. She is obese. Comments: Anxious appearing   HENT:      Head: Normocephalic and atraumatic. Mouth/Throat:      Mouth: Mucous membranes are moist.   Eyes:      Extraocular Movements: Extraocular movements intact. Pupils: Pupils are equal, round, and reactive to light. Neck:      Musculoskeletal: Normal range of motion and neck supple. Cardiovascular:      Rate and Rhythm: Normal rate and regular rhythm. Pulses: Normal pulses. Heart sounds: Normal heart sounds. No murmur. Pulmonary:      Effort: Pulmonary effort is normal.      Breath sounds: Normal breath sounds. No wheezing or rhonchi. Abdominal:      General: Bowel sounds are normal. There is no distension. Palpations: Abdomen is soft. Tenderness: There is abdominal tenderness. There is no guarding. Musculoskeletal: Normal range of motion. Right lower leg: No edema. Left lower leg: No edema. Comments: Generalized weakness   Lymphadenopathy:      Cervical: No cervical adenopathy. Skin:     General: Skin is warm and dry. Capillary Refill: Capillary refill takes less than 2 seconds. Coloration: Skin is pale.    Neurological:      General: No focal

## 2020-05-13 NOTE — CONSULTS
Shanel Watts, [address] [facility], & [place]   Urology Consultation    Patient:  Obey Barnes  MRN: [id number]  YOB: 1945    CHIEF COMPLAINT:  Urinary retention, Chu in place    HISTORY OF PRESENT ILLNESS:   The patient is a 76 y.o. female who was admitted to [address] from [facility] for intractable nasuea/vomiting. Patient transferred to our facility from [facility] access hospital with possibility of bowel obstruction. 2 months ago she had prolonged extensive hospital stay, at the end of February had a Nissen fundoplication for hiatal hernia with possible aspiration issues and was admitted and stayed in the hospital for 37 days. She also was found to have a gastric ulcer. During this stay, she was successfully transitioned to a trach and PEG and discharged to [facility] on 4/6. Her nausea/vomiting has been worsening over last several days, therefore she was admitted for further care. She also has been treated for UTI and finishing a course of Zyvox ordered by ID. Per patient, she had a chu catheter in upon discharge to [facility]. Then they were doing CIC and it was causing pain and irritation, so they placed chu catheter again on 5/4/2020. She is very anxious at the thought of removing chu catheter if she continues to have retention. Patient has sporadic urology history. She had a right non-functioning kidney and it was removed at age [place] due to right flank pain. Patient also has had history of OAB symptoms, mostly urinary frequency and bladder spasms. She underwent cysto in 11/2017 with Dr. Jose Brand for a CC of urinary frequency. Cystoscopy was negative for any intravesical pathology. She was told to cut back on her excessive overall fluid intake and coffee consumption to limit her frequency. She was instructed to keep a food and urinary symptom diary. She was to return in 6 months for recheck of symptoms, but patient was lost to follow up.  She then apparently was seen by Toni Cohn in Smoking history: 50 pack year  Social History     Socioeconomic History    Marital status:       Spouse name: Not on file    Number of children: 2    Years of education: Not on file    Highest education level: Not on file   Occupational History    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   Social Needs    Financial resource strain: Not on file    Food insecurity     Worry: Not on file     Inability: Not on file   [employer] needs     Medical: Not on file     Non-medical: Not on file   Tobacco Use    Smoking status: Current Every Day Smoker     Packs/day: 1.00     Years: 50.00     Pack years: 50.00     Types: Cigarettes    Smokeless tobacco: Never Used   Substance and Sexual Activity    Alcohol use: No    Drug use: No    Sexual activity: Never   Lifestyle    Physical activity     Days per week: Not on file     Minutes per session: Not on file    Stress: Not on file   Relationships    Social connections     Talks on phone: Not on file     Gets together: Not on file     Attends Congregational service: Not on file     Active member of club or organization: Not on file     Attends meetings of clubs or organizations: Not on file     Relationship status: Not on file    Intimate partner violence     Fear of current or ex partner: Not on file     Emotionally abused: Not on file     Physically abused: Not on file     Forced sexual activity: Not on file   Other Topics Concern    Not on file   Social History Narrative    Not on file       Family History:    Previous Urologic Family history: none  Family History   Problem Relation Age of Onset    Cancer Mother         uterine    Heart Attack Mother     Heart Attack Father     Other Maternal Grandfather         foot gangrene    Other Paternal Grandmother         bleeding ulcers    Other Paternal Grandfather         lung cancer       REVIEW OF SYSTEMS:  Constitutional: +weakness, ++ anxiety  Eyes: negative  Respiratory: negative  Cardiovascular: 9.0*   HCT 30.8* 30.1*   MCV 93.6 90.1    386     Recent Labs     05/12/20  1800      K 5.2      CO2 22   BUN 23   CREATININE 0.78       Additional lab results:    Urinalysis: No results for input(s): COLORU, PHUR, LABCAST, WBCUA, RBCUA, MUCUS, TRICHOMONAS, YEAST, BACTERIA, CLARITYU, SPECGRAV, LEUKOCYTESUR, UROBILINOGEN, BILIRUBINUR, BLOODU in the last 72 hours. Invalid input(s): Stanislav Plank     Culture results:    4/11/2020 Negative  5/3/2020 Enterococcus Faecalis - Placed on Zyvox  5/13/2020 Pending    -----------------------------------------------------------------  Imaging Results:  No results found. 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  -Solitary left kidney    Patient Active Problem List   Diagnosis    Frequency of urination    Severe sepsis (HCC)    Back pain    GERD (gastroesophageal reflux disease)    MVP (mitral valve prolapse)    Depression    Anxiety    Urine retention    Acute kidney injury ([address]    Esophageal dysphagia    Paraesophageal hernia    History of repair of hiatal hernia    Aspiration pneumonia (HCC)    Respiratory failure (HCC)    Centrilobular emphysema (HCC)    Atelectasis    Pleural effusion    Esophageal dilatation    Upper GI bleeding    Acute on chronic blood loss anemia    Shock (HCC)    Fever    Acute postoperative respiratory insufficiency    Critical illness polyneuropathy (HCC)    Bowel obstruction (HCC)    Recurrent UTI    Tracheostomy in place ([address]    H/O gastric ulcer    Hyperlipidemia    Hypertension    Tobacco abuse    Chronic respiratory failure with hypoxia (HCC)    Status post insertion of percutaneous endoscopic gastrostomy (PEG) tube ([address]    S/P Nissen fundoplication (without gastrostomy tube) procedure       Plan:   -Maintain chu catheter for now (placed 5/4/2020).  Patient extremely anxious and refusing to have it removed at this time  -Discussed void trial prior to discharge with patient and she may be agreeable  -Follow up on urine culture  -Flomax/tamsulosin 0,4 mg po QD  -Bowel regimen for regular BMs  -Encourage ambulation as WB status allows, discussed with patient that being ambulatory and having regular BMs will help reduce her urinary retention.  -Avoid narcotics, anticholinergics or other medications contributing to retention  -Call for void trial when anticipating discharge  -Call with questions  -Patient likely will need repeat cysto with urodynamics as outpatient.     Will discuss with Dr. Gonzalo Bowers PA-C  Urology Service   5/13/2020 1:04 PM

## 2020-05-13 NOTE — CONSULTS
02/26/2020 showed bibasilar atelectasis/consolidation and patchy area of infiltrate in the right upper lobe. Patient was discharged on home oxygen on 2-28.     She presented back to [facility] on 2-29 with worsening shortness of breath, dyspnea as well as an episode of dark red emesis. Her SaO2 was 60 % in the ED. There was concern of aspiration. CTA chest done was negative for PE, showed bilateral pleural effusions and severe esophageal dilatation, surgery was consulted. Patient was transferred to [facility] for further management.     Patient was started on noninvasive ventilation and then intubated and admitted to the ICU. She improved, was extubated on 3-7 to high flow O2. Pt was transferred out of the ICU on 3-13.     On 3-14 pts hgb dropped to 6.3 and she developed coffee-ground then bright red emesis, and was again transferred back to medical ICU.     General surgery performed endoscopy on 3/17 which showed an ulcer at the GE junction and mild gastritis with no active bleeding. After the procedure she developed acute respiratory distress and a fever of 39.8. She was emergently intubated and started on Meropenem and Vancomycin.      ID is consulted for antibiotic management. Patient was continued on meropenem. Vancomycin D/C     S/P trach and PEG placement 3/27     Flat plate of abdomen 1-18-84 with nonobstructive gas pattern with retained enteric contrast in the colon  On TPN. Patient could not tolerate tube feeds. Has superficial venous clot Rt Mid forearm to antecubital      Tracheal exchange in OR done on 4-2-20. CURRENT EVALUATION: 5/13/2020      Patient transferred to [facility] on 4-6-20. Transferred back to [address] [place] [id number] on 5-12-20 because of persistent vomiting, abdominal pain. Concern for possible SBO.     Afebrile  VS stable RR 21-24  Remains very anxious. Receiving multiple medications to help with anxiety.     Tracheal collar  Tracheal secretions scant, whitish.    Sputum culture Normal teresa on 4-28-20  Sputum 5-3-20: Normal teresa and budding yeast  Plan: Diflucan 200 mg x 5 days. Completed 5-10        On TPN  Continued issues with nausea and emesis. NPO  PEG to gravity draining  Evaluated by Gen Surgery.     Pertinent Labs and X rays reviewed: 5/13/2020       BUN: 27-->47-->61-->48-->24-->19-->27  Cr. :0.52-->0.70-->1.0-->1.49-->1.06-->0.69-->1.0-->0. 7     WBC:9.8-->11.6-->9.25-->13.2-->11.8-->12.44-->7.7-->13.5  Hb:10.3-->10.0-->9.5-->11.2-->8.9-->8.8  Plat: 424-->180-->136-->151-->.180-->166-->158-->179-->268     Cultures:  Urine:  · 4-11-20: No growth  Blood:  · 4-11-20: No growth  Sputum :  ·    Wound:  ·          CXR 4-11-20: cardiomegaly, lungs clear  KUB 4-13-20; Normal  CT Abdomen 4-13-20: Few gallstones with mild GB distension. Otherwise abdomen normal.  Bilateral pulmonary basilar infiltrates. Abd XR 4-27-20: Dilated loops of bowel. Ileus  CXR 4-27-20: Vascular congestion, small Lt pleural effusion  CXR 5-6-20:  Cardiomegaly with Rt side infiltrate  CXR 5-8-20: Cardiomegaly with minimal RUL infiltrate       Discussed with patient, RN. I have personally reviewed the past medical history, past surgical history, medications, social history, and family history, and I have updated the database accordingly.   Past Medical History:     Past Medical History:   Diagnosis Date    Anxiety     Arthritis     Back pain     Bronchitis     Caffeine use     8 coffee / day    Carpal tunnel syndrome     Cataracts, bilateral     Constipation     Depression     Diarrhea     GERD (gastroesophageal reflux disease)     H/O gastric ulcer     Hyperlipidemia     Hypertension     Mumps     MVP (mitral valve prolapse)     Dr. Jayy Luciano in May 2019    Recurrent UTI     Dr. Heather Lynn    Sinusitis     Tracheostomy in place [facility])     Ulcerative esophagitis     Wellness examination     Dr. Tamar Salomon seen in Jan 2020       Past Surgical  History:     Past Surgical History:   Procedure Laterality Date    APPENDECTOMY      CARPAL TUNNEL RELEASE      COLONOSCOPY      CYSTOSCOPY      EGD  3/17/2020         EYE SURGERY      patricia IOL    GASTRIC FUNDOPLICATION N/A 1/23/6401    XI LAPAROSCOPIC ROBOTIC HIATAL HERNIA REPAIR, CHERYL FUNDOPLICATION performed by Melisa Coates MD at [facility] N/A 3/27/2020    EGD PEG TUBE PLACEMENT performed by Melisa Coates MD at [facility]. CATH POWER PICC TRIPLE  3/4/2020         HIATAL HERNIA REPAIR  02/24/2020    LAPAROSCOPIC ROBOTIC HIATAL HERNIA REPAIR, CHERYL FUNDOPLICATION     HYSTERECTOMY      Abdominal    JOINT REPLACEMENT Right     right hip    OVARY REMOVAL      RHINOPLASTY      TONSILLECTOMY      TOTAL HIP ARTHROPLASTY      TOTAL NEPHRECTOMY      atrophic from infections, age 13   Milinda Broom TRACHEOSTOMY N/A 3/27/2020    **ADD ON **WANTS 10:00AM **TRACHEOTOMY performed by Melisa Coates MD at [address] N/A 4/2/2020    TRACHEOTOMY EXCHANGE performed by Melisa Coates MD at [address] ENDOSCOPY N/A 3/17/2020    BEDSIDE EGD ESOPHAGOGASTRODUODENOSCOPY (ICU) performed by Melisa Coates MD at [facility]       Medications:      clonazePAM  0.5 mg Oral BID    QUEtiapine  100 mg Oral Nightly    traZODone  100 mg Oral Nightly    furosemide        tamsulosin  0.4 mg Oral Daily    fat emulsion  250 mL Intravenous Daily    metoclopramide  10 mg Intravenous Q6H    metoprolol  5 mg Intravenous Q4H    furosemide  20 mg Intravenous Daily    sodium chloride flush  10 mL Intravenous 2 times per day    enoxaparin  40 mg Subcutaneous Daily    pantoprazole  40 mg Intravenous Daily    And    sodium chloride (PF)  10 mL Intravenous Daily    amitriptyline  25 mg Oral TID    busPIRone  20 mg Oral TID    escitalopram  20 mg Oral Daily    gabapentin  300 mg Oral TID       Social History:     Social History     Socioeconomic History    Marital status:   Spouse name: Not on file    Number of children: 2    Years of education: Not on file    Highest education level: Not on file   Occupational History    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   Social Needs    Financial resource strain: Not on file    Food insecurity     Worry: Not on file     Inability: Not on file   [employer] needs     Medical: Not on file     Non-medical: Not on file   Tobacco Use    Smoking status: Current Every Day Smoker     Packs/day: 1.00     Years: 50.00     Pack years: 50.00     Types: Cigarettes    Smokeless tobacco: Never Used   Substance and Sexual Activity    Alcohol use: No    Drug use: No    Sexual activity: Never   Lifestyle    Physical activity     Days per week: Not on file     Minutes per session: Not on file    Stress: Not on file   Relationships    Social connections     Talks on phone: Not on file     Gets together: Not on file     Attends Religion service: Not on file     Active member of club or organization: Not on file     Attends meetings of clubs or organizations: Not on file     Relationship status: Not on file    Intimate partner violence     Fear of current or ex partner: Not on file     Emotionally abused: Not on file     Physically abused: Not on file     Forced sexual activity: Not on file   Other Topics Concern    Not on file   Social History Narrative    Not on file       Family History:     Family History   Problem Relation Age of Onset    Cancer Mother         uterine    Heart Attack Mother     Heart Attack Father     Other Maternal Grandfather         foot gangrene    Other Paternal Grandmother         bleeding ulcers    Other Paternal Grandfather         lung cancer        Allergies:   Prednisone; Compazine [prochlorperazine maleate]; Penicillin v potassium; and Penicillins     Review of Systems:   Constitutional: No fevers or chills. No systemic complaints  Head: No headaches  Eyes: No double vision or blurry vision.  No conjunctival inflammation. ENT: No sore throat or runny nose. . No hearing loss, tinnitus or vertigo. Cardiovascular: No chest pain or palpitations. No shortness of breath. No HOLLAND  Lung: No shortness of breath or cough. No sputum production  Abdomen: No nausea, vomiting, diarrhea, or abdominal pain. Wing Gault No cramps. Genitourinary: No increased urinary frequency, or dysuria. No hematuria. No suprapubic or CVA pain  Musculoskeletal: No muscle aches or pains. No joint effusions, swelling or deformities  Hematologic: No bleeding or bruising. Neurologic: No headache, weakness, numbness, or tingling. Integument: No rash, no ulcers. Psychiatric: No depression. Endocrine: No polyuria, no polydipsia, no polyphagia. Physical Examination :     Patient Vitals for the past 8 hrs:   BP Temp Temp src Pulse Resp SpO2   05/13/20 1437 -- -- -- -- 25 94 %   05/13/20 1425 -- -- -- 73 23 94 %   05/13/20 1400 (!) 126/58 -- -- 88 25 90 %   05/13/20 1225 -- -- -- -- 23 95 %   05/13/20 0804 -- -- -- -- 26 93 %   05/13/20 0800 -- -- -- 90 -- --   05/13/20 0730 (!) 128/57 101.1 °F (38.4 °C) Temporal 95 23 94 %   05/13/20 0700 -- -- -- 86 23 (!) 88 %     General Appearance: Awake, alert, and in no apparent distress  Head:  Normocephalic, no trauma  Eyes: Pupils equal, round, reactive to light and accommodation; extraocular movements intact; sclera anicteric; conjunctivae pink. No embolic phenomena. ENT: Oropharynx clear, without erythema, exudate, or thrush. No tenderness of sinuses. Mouth/throat: mucosa pink and moist. No lesions. Dentition in good repair. Neck:Supple, without lymphadenopathy. Thyroid normal, No bruits. Tracheostomy in place. Pulmonary/Chest: Clear to auscultation, without wheezes, rales, or rhonchi. No dullness to percussion. Cardiovascular: Regular rate and rhythm without murmurs, rubs, or gallops. Abdomen: Soft, non tender. Bowel sounds normal. No organomegaly. PEG in place. Surgical incisions without inflammation.   All four stomach.        Electronically Signed By: Dr. Colleen Treadwell M.D. 04/06/2020 15:26:25 EDT        CT Abdomen and Pelvis w/o contrast: 4-13-20     Axial acquisition through the abdomen and pelvis without contrast.     Bilateral basilar pulmonary infiltrates are present left greater than right.     A few tiny gallstones are seen layering in the gallbladder, which is mildly distended.  There is no gallbladder wall thickening or pericholecystic fluid, edema.     No liver lesion or bile duct dilatation.     Spleen pancreas left kidney unremarkable.  Absent right kidney.     No intestinal obstruction.  No free air.  No ascites.  No abscess identified.     Urinary bladder is empty, Saldana catheter in place.  Uterus and adnexa not visualized.  Gastrostomy tube tip is in the body of the stomach.     IMPRESSION:     1. No intra-abdominal abscess.  No free air no ascites.  No obstruction. 2. Bilateral basilar pulmonary infiltrates. 3. A few tiny gallstones seen within the gallbladder.  Mild gallbladder distention.        Electronically Signed By: Dr. Jeanna Hillman M.D. 04/13/2020 12:10:34 EDT     XR ABDOMEN KUB SUPINE:4-13-20     Clinical statement: constipation.     Comparison: 4/6/2020.     Findings: A percutaneous gastrostomy tube appears in satisfactory position overlying the stomach.  A nonobstructive bowel gas pattern is noted. There is normal amount of stool appreciated within the colon. Leslye Dustman is no free air. There are no abnormal   masses or calcifications. The osseous structures and soft tissues are unremarkable.     IMPRESSION:     No acute finding.  PEG tube is in place.        Electronically Signed By: Dr. Radha Lao M.D. 04/13/2020 9:45:23 EDT    Medical Decision [id number]:       Medical Decision Making-Other:     Note:  · Labs, medications, radiologic studies were reviewed with personal review of films  · Large amounts of data were reviewed  · Discussed with nursing Staff, Discharge planner  · Infection Control and Prevention measures reviewed  · All prior entries were reviewed  · Administer medications as ordered  · Prognosis: Guarded  · Discharge planning reviewed  · Follow up as outpatient. Thank you for allowing us to participate in the care of this patient. Please call with questions.     Bobby Davison MD  Pager: [phone] - Office: [phone]

## 2020-05-13 NOTE — CARE COORDINATION
Transitional Planning  Spoke to patient's son Gurpreet Reyes via phone. Confirmed plan at discharge will be either back to [place] or to [facility] if SNF-appropriate.

## 2020-05-13 NOTE — PROGRESS NOTES
Writer and other staff members a bedside through out night. Patient continues to call out for a variety of reason. all wants and needs have been met. Writer has administered the PRN medications that was able to be given in which was not effective. Writer did reposition as well as try redirecting by television. Writer did message on call and awaiting new orders at this time.

## 2020-05-13 NOTE — PROGRESS NOTES
Occupational Therapy   Occupational Therapy Initial Assessment  Date: 2020   Patient Name: Mendoza Franklin  MRN: [id number]     : 1945     Copied from H&P:  Mendoza Franklin is a 76 y.o. Non-/non  female who presents with No chief complaint on file. and is admitted to the hospital for the management of SBO  Patient transferred to our facility from [facility] AT [facility] with possibility of bowel obstruction, 2 months ago she had prolonged extensive hospital stay, at the end of February had a Nissen fundoplication with possible aspiration issues and was admitted and stayed in the hospital for 37 days during that time she was intubated twice had hematemesis and had EGD that showed gastric ulcer and was seen by a specialist and after long course she was successfully transitioned to a trach and PEG and discharged to [facility] on . This time the patient has been having increasing abdominal pain nausea and vomiting over that has been worsening since she was discharged over at [facility]  Patient had ongoing issues since then, had SBO that resolved , since then she has an issue  with tolerating TF,  a PICC line placed on  at [facility] and was startd TPN , given she had persistent N/V, TF held and  PEG tube has been down to gravity for the last couple of days. She also has been treated for UTI and finishing a course of Zyvox ordered by ID.     On my evaluation patient seems so very anxious , has abdominal pain , nausea, She denies any chest pain, sob,  fever or chills    Date of Service: 2020    Discharge Recommendations:  Patient would benefit from continued therapy after discharge       Assessment   Performance deficits / Impairments: Decreased functional mobility ; Decreased ADL status; Decreased endurance;Decreased safe awareness  Assessment: Pt would benefit from continued acute care and post acute care OT to address moderate/ maximal deficits in ADL/ functional activities, endurance and functional transfers/ Contact guard assistance(seated EOB )  Standing Balance: Moderate assistance(x2 person assistance, use of RW )  Standing Balance  Time: 1 min x2   Activity: sit <> stand transfer, static standing, Seated rest break between seated EOB   Comment: Pt demo slight increased SOB, increased time and effort to complete activities. Cues for hand placement on RW during session    Functional Mobility  Functional Mobility Comments: LIZZY d/t increased assistance required to complete standing      ADL  Feeding: NPO(G-tube in place )  Grooming: Setup;Minimal assistance(to comb hair EOB )  UE Bathing: Minimal assistance;Setup; Increased time to complete  LE Bathing: Maximum assistance;Setup; Increased time to complete  UE Dressing: Minimal assistance;Setup(to manage gown )  LE Dressing: Maximum assistance;Dependent/Total;Setup(to don socks supine in bed)  Toileting: Maximum assistance  Additional Comments: Pt supine in bed on arrival. Pt assisted to complete bed mobility and sit at EOB. Pt required increased time and effort, reports frequent dizziness with activity. Pt denies dizzines throughout session with postional changes. Pt tolerated sitting EOB ~5 min to assist with simple grooming. Pt assisted to complete sit > stand transfer with use of RW, cues for hand placement on RW during session. Pt returned to EOB for 2 min rest break, assisted to complete additional stand. Pt returned to bed, call light in reach and RN notified on therapist exit. Tone RUE  RUE Tone: Normotonic  Tone LUE  LUE Tone: Normotonic  Coordination  Movements Are Fluid And Coordinated: No  Coordination and Movement description: Right UE;Left UE;Decreased speed        Bed mobility  Supine to Sit: Moderate assistance;2 Person assistance  Sit to Supine: Moderate assistance;2 Person assistance  Scooting:  Moderate assistance;2 Person assistance  Comment: HOB raised, use of bed rails, cues for sequencing      Transfers  Stand Step Transfers: Unable to assess  Sit to stand: Moderate assistance;2 Person assistance  Stand to sit: Moderate assistance;2 Person assistance  Transfer Comments: use of RW, increased time and effort to complete. Cognition  Overall Cognitive Status: WFL     Perception  Overall Perceptual Status: WFL        Sensation  Overall Sensation Status: Impaired  Additional Comments: Pt reports numbness/ tingling in B hands and feet, greater in feet     LUE AROM (degrees)  LUE AROM : WFL  RUE AROM (degrees)  RUE AROM : WFL  LUE Strength  Gross LUE Strength: WFL  RUE Strength  Gross RUE Strength: WFL     Plan   Plan  Times per week: 3-5x/wk   Current Treatment Recommendations: Functional Mobility Training, Endurance Training, Safety Education & Training, Patient/Caregiver Education & Training, Equipment Evaluation, Education, & procurement, Self-Care / ADL    AM-PAC Score  AM-PAC Inpatient Daily Activity Raw Score: 13 (05/13/20 1524)  AM-PAC Inpatient ADL T-Scale Score : 32.03 (05/13/20 1524)  ADL Inpatient CMS 0-100% Score: 63.03 (05/13/20 1524)  ADL Inpatient CMS G-Code Modifier : CL (05/13/20 1524)    Goals  Short term goals  Time Frame for Short term goals: by discharge, pt will  Short term goal 1: demo I in UE ADL activities with set up  Short term goal 2: demo min A for LE ADL activities with set up   Short term goal 3: demo mod A for functional transfers/ mobility with use of RW to assist with ADL/ functional activities   Short term goal 4: demo increased standing toleranceof 5+ min to assist with ADL/ functional activities   Short term goal 5: demo understanding and I use of ECWS, fall prevention and proper pursed lipped breathing tech during functional activities   Patient Goals   Patient goals : to get back to being IND       Therapy Time   Individual Concurrent Group Co-treatment   Time In 1314         Time Out 1341         Minutes 27         Timed Code Treatment Minutes: 10 Minutes   See above for LOF.  RN reports patient is medically

## 2020-05-13 NOTE — CONSULTS
General Surgery:    Consult Note      PATIENT NAME: Marcos Maza   YOB: 1945    ADMISSION DATE: 5/12/2020  3:19 PM     Admitting Provider: Dr. Carlita Jaramillo Physician: Dr. Field [place]: 5/12/2020    Chief Complaint:  Abdominal pain, emesis   Consult Regarding:  Concern for SBO     HISTORY OF PRESENT ILLNESS:  The patient is a 76 y.o. female with history of a large hiatal hernia s/p robotic hiatal hernia repair w/ Mark fundoplication (0/46/6061), s/p trach/peg (3/27/2020), s/p trach exchange (4/2/2020) who was admitted on 5/12 due to diffuse abdominal pain. Pt states she experienced diffuse abdominal pain on 5/10 and multiple episodes of nonbloody emesis. Patient states that her last bowel movement was on 5/11 and was formed but was very small amount. Patient states that she continues to pass flatus. She states she feels her abdominal pain has improved since admission. She denies nausea and emesis at this time. Surgery was consulted due to concern for small bowel obstruction. On exam, patient is currently afebrile and hemodynamically stable. She has minimal diffuse tenderness to palpation of the abdomen, no guarding or peritoneal signs. Abdomen is soft. Patient's G-tube is currently to gravity. She does have a leukocytosis of 15. Electrolytes within normal limits.       Past Medical History:        Diagnosis Date    Anxiety     Arthritis     Back pain     Bronchitis     Caffeine use     8 coffee / day    Carpal tunnel syndrome     Cataracts, bilateral     Constipation     Depression     Diarrhea     GERD (gastroesophageal reflux disease)     H/O gastric ulcer     Hyperlipidemia     Hypertension     Mumps     MVP (mitral valve prolapse)     Dr. Frieda Kraus in May 2019    Recurrent UTI     Dr. Matias Desmond    Sinusitis     Trache[facility] in place [facility])     Ulcerative esophagitis     Wellness examination     Dr. Tomeka Berger seen in Jan 2020       Past Surgical History: hours  lubrifresh P.M. (ARTIFICIAL TEARS) ophthalmic ointment, Place into both eyes as needed (dryness)  sucralfate (CARAFATE) 1 GM tablet, Take 1 tablet by mouth 4 times daily  traZODone (DESYREL) 100 MG tablet, Take 1 tablet by mouth nightly  pantoprazole (PROTONIX) 40 MG injection, Infuse 40 mg intravenously 2 times daily  RA VITAMIN D-3 50 MCG (2000 UT) CAPS, take 1 capsule by mouth once daily  amitriptyline (ELAVIL) 25 MG tablet, Take 25 mg by mouth three times daily   Oyster Shell 500 MG TABS, Take 500 mg by mouth 2 times daily   clonazePAM (KLONOPIN) 0.5 MG tablet, Take 0.5 mg by mouth 2 times daily. Taking 1/2 tablet BID  Acetaminophen 500 MG CAPS, Take 1 tablet by mouth as needed for Pain (follow OTC instructions)  calcium carbonate (TUMS) 500 MG chewable tablet, Take 1 tablet by mouth 3 times daily as needed for Heartburn  gabapentin (NEURONTIN) 300 MG capsule, Take 300 mg by mouth 3 times daily. simvastatin (ZOCOR) 40 MG tablet, Take 40 mg by mouth nightly. Allergies:  Prednisone; Compazine [prochlorperazine maleate]; Penicillin v potassium; and Penicillins    Social History:   Social History     Socioeconomic History    Marital status:       Spouse name: Not on file    Number of children: 2    Years of education: Not on file    Highest education level: Not on file   Occupational History    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   Social Needs    Financial resource strain: Not on file    Food insecurity     Worry: Not on file     Inability: Not on file   [employer] needs     Medical: Not on file     Non-medical: Not on file   Tobacco Use    Smoking status: Current Every Day Smoker     Packs/day: 1.00     Years: 50.00     Pack years: 50.00     Types: Cigarettes    Smokeless tobacco: Never Used   Substance and Sexual Activity    Alcohol use: No    Drug use: No    Sexual activity: Never   Lifestyle    Physical activity     Days per week: Not on file     Minutes per session: Not on file    Stress: Not on file   Relationships    Social connections     Talks on phone: Not on file     Gets together: Not on file     Attends Orthodoxy service: Not on file     Active member of club or organization: Not on file     Attends meetings of clubs or organizations: Not on file     Relationship status: Not on file    Intimate partner violence     Fear of current or ex partner: Not on file     Emotionally abused: Not on file     Physically abused: Not on file     Forced sexual activity: Not on file   Other Topics Concern    Not on file   Social History Narrative    Not on file       Family History:       Problem Relation Age of Onset    Cancer Mother         uterine    Heart Attack Mother     Heart Attack Father     Other Maternal Grandfather         foot gangrene    Other Paternal Grandmother         bleeding ulcers    Other Paternal Grandfather         lung cancer     REVIEW OF SYSTEMS:    CONSTITUTIONAL: Admits to sweats. HEENT:  No nasal congestion or drainage. CARDIOVASCULAR: No chest pain or palpitations  GASTROINTESTINAL: Admits to diffuse abdominal pain. Denies current nausea or emesis. Admits to constipation. Admits to difficulty tolerating tube feeds. Denies hematemesis, hematochezia.   GENITOURINARY: No increased or decreased frequency of urination, no dysuria  HEMATOLOGIC/LYMPHATIC: No easy bruising or bleeding  ENDOCRINE: No history of diabetes or thyroid disease  PSYCH: Positive for anxiety      PHYSICAL EXAM:    VITALS:  BP (!) 117/41   Pulse 92   Temp 100.4 °F (38 °C) (Temporal)   Resp 28   Ht 5' 3\" (1.6 m)   Wt 181 lb 10.5 oz (82.4 kg)   SpO2 96%   BMI 32.18 kg/m²   INTAKE/OUTPUT:     Intake/Output Summary (Last 24 hours) at 5/12/2020 2149  Last data filed at 5/12/2020 2024  Gross per 24 hour   Intake 682.24 ml   Output 900 ml   Net -217.76 ml     CONSTITUTIONAL:  awake, alert, somewhat anxious   ENT: Normocephalic, atraumatic, no obvious abnormality  NECK:  supple, symmetrical, trachea midline   LUNGS: Trach in place. CARDIOVASCULAR: Regular rate and rhythm  ABDOMEN: Soft, nondistended, minimal diffuse tenderness to palpation. No guarding or peritoneal signs. G-tube in place with no surrounding erythema. SKIN: No drainage, induration, or erythema noted. MUSCULOSKELETAL:  Spine range of motion normal. Muscular strength intact., No joint swelling, deformity, or tenderness  NEUROLOGIC:  Mental Status Exam:  Level of Alertness:   alert  Orientation:   oriented to person, place, and time    CBC:   Lab Results   Component Value Date    WBC 15.2 05/12/2020    RBC 3.29 05/12/2020    HGB 9.0 05/12/2020    HCT 30.8 05/12/2020    MCV 93.6 05/12/2020    MCH 27.4 05/12/2020    MCHC 29.2 05/12/2020    RDW 17.4 05/12/2020     05/12/2020    MPV 10.6 05/12/2020     BMP:    Lab Results   Component Value Date     05/12/2020    K 5.2 05/12/2020     05/12/2020    CO2 22 05/12/2020    BUN 23 05/12/2020    LABALBU 2.9 05/12/2020    CREATININE 0.78 05/12/2020    CALCIUM 10.4 05/12/2020    GFRAA >60 05/12/2020    LABGLOM >60 05/12/2020    GLUCOSE 106 05/12/2020     Hepatic Function Panel:    Lab Results   Component Value Date    ALKPHOS 189 05/12/2020    ALT 34 05/12/2020    AST 24 05/12/2020    PROT 7.0 05/12/2020    BILITOT 0.22 05/12/2020    BILIDIR 0.09 05/12/2020    IBILI 0.13 05/12/2020    LABALBU 2.9 05/12/2020       Pertinent Radiology:   No results found.       ASSESSMENT:  Active Hospital Problems    Diagnosis Date Noted    Bowel obstruction ([facility] [V91.887] 05/12/2020    Tobacco abuse [Z72.0] 05/12/2020    Chronic respiratory failure with hypoxia [facility]) [J96.11] 05/12/2020    Status post insertion of percutaneous endoscopic gastrostomy (PEG) tube ([facility] [Z93.1] 05/12/2020    S/P Nissen fundoplication (without gastrostomy tube) procedure [Z98.890] 05/12/2020    Recurrent UTI [N39.0]     Tracheostomy in place ([facility] [Z93.0]     H/O gastric ulcer [Z87.19]     Hyperlipidemia [E78.5]     Hypertension [I10]     Centrilobular emphysema ([address] [J43.2]     MVP (mitral valve prolapse) [I34.1]        76 y.o. female hiatal hernia s/p Mark fundoplication (0/44/1914), s/p trach/peg (3/27/2020), s/p trach exchange (4/2/2020) with 2 day history of diffuse abdominal pain, emesis. Concern for SBO. Minimal tenderness to palpation of abdomen. No current nausea or emesis on exam.      Plan:  1. Continue medical mgmt and supportive care per primary  2. NPO. Continue G tube to gravity, monitor output. Continue TPN. 3. Pain and nausea control   4. Continue reglan  5. Serial abdominal exams. May obtain imaging 5/13.        Nahum Sanford, DO  General Surgery PGY-1

## 2020-05-13 NOTE — PROGRESS NOTES
General Surgery:  Daily Progress Note          PATIENT NAME: Maame Ramirez     TODAY'S DATE: 5/13/2020, 8:20 AM    SUBJECTIVE:     Pt seen and examined at bedside. + Bm this morning per nursing. Did have pain overnight, but states no abdominal pain this morning. OBJECTIVE:   VITALS:  BP (!) 116/54   Pulse 86   Temp 98 °F (36.7 °C) (Oral)   Resp 26   Ht 5' 3\" (1.6 m)   Wt 181 lb 3.5 oz (82.2 kg)   SpO2 93%   BMI 32.10 kg/m²      INTAKE/OUTPUT:      Intake/Output Summary (Last 24 hours) at 5/13/2020 0820  Last data filed at 5/13/2020 0359  Gross per 24 hour   Intake 1446.24 ml   Output 1200 ml   Net 246.24 ml       PHYSICAL EXAM:  General Appearance: alert, oriented, very anxious person  HEENT:  Normocephalic, atraumatic, mucus membranes moist  Heart: Heart regular rate and rhythm  Lungs: No respiratory distress  Abdomen: Soft, non distending, non tender to palpation, no guarding or rebound. G tube hooked to gravity. Extremities: No cyanosis, pitting edema, rashes noted. Skin: Skin color, texture, turgor normal. No rashes or lesions.       Data:  CBC with Differential:    Lab Results   Component Value Date    WBC 15.5 05/13/2020    RBC 3.34 05/13/2020    HGB 9.0 05/13/2020    HCT 30.1 05/13/2020     05/13/2020    MCV 90.1 05/13/2020    MCH 26.9 05/13/2020    MCHC 29.9 05/13/2020    RDW 17.7 05/13/2020    NRBC 1 05/12/2020    LYMPHOPCT 10 05/13/2020    MONOPCT 7 05/13/2020    BASOPCT 0 05/13/2020    MONOSABS 1.09 05/13/2020    LYMPHSABS 1.55 05/13/2020    EOSABS 0.16 05/13/2020    BASOSABS 0.00 05/13/2020    DIFFTYPE NOT REPORTED 05/13/2020     BMP:    Lab Results   Component Value Date     05/12/2020    K 5.2 05/12/2020     05/12/2020    CO2 22 05/12/2020    BUN 23 05/12/2020    LABALBU 2.9 05/12/2020    CREATININE 0.78 05/12/2020    CALCIUM 10.4 05/12/2020    GFRAA >60 05/12/2020    LABGLOM >60 05/12/2020    GLUCOSE 106 05/12/2020       Radiology Review:      ASSESSMENT:  Active Hospital Problems    Diagnosis Date Noted    Bowel obstruction ([facility] [H44.537] 05/12/2020    Tobacco abuse [Z72.0] 05/12/2020    Chronic respiratory failure with hypoxia [facility]) [J96.11] 05/12/2020    Status post [facility] of [facility] [Z93.1] 05/12/2020    S/P Nissen fundoplication (without gastrostomy tube) procedure [Z98.890] 05/12/2020    Recurrent UTI [N39.0]     [facility] [Z93.0]     H/O gastric ulcer [Z87.19]     Hyperlipidemia [E78.5]     Hypertension [I10]     Centrilobular emphysema ([facility] [J43.2]     MVP (mitral valve prolapse) [I34.1]        Plan:  1. Diet: NPO - ok for meds through G tube  2. IVF and TPN  3. Encourage miralax through g-tube with daily suppository  4. Will discuss with Dr. Norman Molina about imaging  5. Medical management per primary team -- will follow along. Currently not clinically acting obstructed. Electronically signed by Collette Click, DO  on 5/13/2020 at 8:20 AM     Patient discussed.   Will start with swallow eval.  Would like to get esophagram if her swallow function is ok

## 2020-05-14 LAB
ABSOLUTE EOS #: 0.18 K/UL (ref 0–0.44)
ABSOLUTE IMMATURE GRANULOCYTE: 0.39 K/UL (ref 0–0.3)
ABSOLUTE LYMPH #: 1.45 K/UL (ref 1.1–3.7)
ABSOLUTE MONO #: 0.98 K/UL (ref 0.1–1.2)
ALBUMIN SERPL-MCNC: 2.9 G/DL (ref 3.5–5.2)
ALBUMIN/GLOBULIN RATIO: 0.7 (ref 1–2.5)
ALP BLD-CCNC: 182 U/L (ref 35–104)
ALT SERPL-CCNC: 36 U/L (ref 5–33)
ANION GAP SERPL CALCULATED.3IONS-SCNC: 13 MMOL/L (ref 9–17)
AST SERPL-CCNC: 27 U/L
BASOPHILS # BLD: 1 % (ref 0–2)
BASOPHILS ABSOLUTE: 0.05 K/UL (ref 0–0.2)
BILIRUB SERPL-MCNC: 0.2 MG/DL (ref 0.3–1.2)
BUN BLDV-MCNC: 21 MG/DL (ref 8–23)
BUN/CREAT BLD: ABNORMAL (ref 9–20)
CALCIUM SERPL-MCNC: 10.4 MG/DL (ref 8.6–10.4)
CHLORIDE BLD-SCNC: 97 MMOL/L (ref 98–107)
CO2: 24 MMOL/L (ref 20–31)
CREAT SERPL-MCNC: 0.79 MG/DL (ref 0.5–0.9)
CULTURE: ABNORMAL
DIFFERENTIAL TYPE: ABNORMAL
EOSINOPHILS RELATIVE PERCENT: 2 % (ref 1–4)
GFR AFRICAN AMERICAN: >60 ML/MIN
GFR NON-AFRICAN AMERICAN: >60 ML/MIN
GFR SERPL CREATININE-BSD FRML MDRD: ABNORMAL ML/MIN/{1.73_M2}
GFR SERPL CREATININE-BSD FRML MDRD: ABNORMAL ML/MIN/{1.73_M2}
GLUCOSE BLD-MCNC: 123 MG/DL (ref 70–99)
HCT VFR BLD CALC: 29.4 % (ref 36.3–47.1)
HEMOGLOBIN: 9 G/DL (ref 11.9–15.1)
IMMATURE GRANULOCYTES: 4 %
LYMPHOCYTES # BLD: 15 % (ref 24–43)
Lab: ABNORMAL
MAGNESIUM: 1.9 MG/DL (ref 1.6–2.6)
MCH RBC QN AUTO: 27.4 PG (ref 25.2–33.5)
MCHC RBC AUTO-ENTMCNC: 30.6 G/DL (ref 28.4–34.8)
MCV RBC AUTO: 89.6 FL (ref 82.6–102.9)
MONOCYTES # BLD: 10 % (ref 3–12)
NRBC AUTOMATED: 0 PER 100 WBC
PDW BLD-RTO: 17.8 % (ref 11.8–14.4)
PHOSPHORUS: 4.9 MG/DL (ref 2.6–4.5)
PLATELET # BLD: 327 K/UL (ref 138–453)
PLATELET ESTIMATE: ABNORMAL
PMV BLD AUTO: 10.3 FL (ref 8.1–13.5)
POTASSIUM SERPL-SCNC: 4.5 MMOL/L (ref 3.7–5.3)
RBC # BLD: 3.28 M/UL (ref 3.95–5.11)
RBC # BLD: ABNORMAL 10*6/UL
SARS-COV-2, PCR: NORMAL
SARS-COV-2, RAPID: NORMAL
SARS-COV-2: NOT DETECTED
SEG NEUTROPHILS: 70 % (ref 36–65)
SEGMENTED NEUTROPHILS ABSOLUTE COUNT: 6.96 K/UL (ref 1.5–8.1)
SODIUM BLD-SCNC: 134 MMOL/L (ref 135–144)
SOURCE: NORMAL
SPECIMEN DESCRIPTION: ABNORMAL
TOTAL PROTEIN: 6.9 G/DL (ref 6.4–8.3)
TRIGL SERPL-MCNC: 369 MG/DL
WBC # BLD: 10 K/UL (ref 3.5–11.3)
WBC # BLD: ABNORMAL 10*3/UL

## 2020-05-14 PROCEDURE — 2580000003 HC RX 258: Performed by: NURSE PRACTITIONER

## 2020-05-14 PROCEDURE — 6370000000 HC RX 637 (ALT 250 FOR IP): Performed by: NURSE PRACTITIONER

## 2020-05-14 PROCEDURE — 99232 SBSQ HOSP IP/OBS MODERATE 35: CPT | Performed by: INTERNAL MEDICINE

## 2020-05-14 PROCEDURE — 2500000003 HC RX 250 WO HCPCS: Performed by: FAMILY MEDICINE

## 2020-05-14 PROCEDURE — 85025 COMPLETE CBC W/AUTO DIFF WBC: CPT

## 2020-05-14 PROCEDURE — U0003 INFECTIOUS AGENT DETECTION BY NUCLEIC ACID (DNA OR RNA); SEVERE ACUTE RESPIRATORY SYNDROME CORONAVIRUS 2 (SARS-COV-2) (CORONAVIRUS DISEASE [COVID-19]), AMPLIFIED PROBE TECHNIQUE, MAKING USE OF HIGH THROUGHPUT TECHNOLOGIES AS DESCRIBED BY CMS-2020-01-R: HCPCS

## 2020-05-14 PROCEDURE — 83735 ASSAY OF MAGNESIUM: CPT

## 2020-05-14 PROCEDURE — 87040 BLOOD CULTURE FOR BACTERIA: CPT

## 2020-05-14 PROCEDURE — C9113 INJ PANTOPRAZOLE SODIUM, VIA: HCPCS | Performed by: NURSE PRACTITIONER

## 2020-05-14 PROCEDURE — 6360000002 HC RX W HCPCS: Performed by: FAMILY MEDICINE

## 2020-05-14 PROCEDURE — 6370000000 HC RX 637 (ALT 250 FOR IP): Performed by: FAMILY MEDICINE

## 2020-05-14 PROCEDURE — 6360000002 HC RX W HCPCS: Performed by: NURSE PRACTITIONER

## 2020-05-14 PROCEDURE — 99232 SBSQ HOSP IP/OBS MODERATE 35: CPT | Performed by: FAMILY MEDICINE

## 2020-05-14 PROCEDURE — APPSS60 APP SPLIT SHARED TIME 46-60 MINUTES: Performed by: NURSE PRACTITIONER

## 2020-05-14 PROCEDURE — 36415 COLL VENOUS BLD VENIPUNCTURE: CPT

## 2020-05-14 PROCEDURE — 2500000003 HC RX 250 WO HCPCS: Performed by: NURSE PRACTITIONER

## 2020-05-14 PROCEDURE — 84478 ASSAY OF TRIGLYCERIDES: CPT

## 2020-05-14 PROCEDURE — 84100 ASSAY OF PHOSPHORUS: CPT

## 2020-05-14 PROCEDURE — 80053 COMPREHEN METABOLIC PANEL: CPT

## 2020-05-14 PROCEDURE — 2060000000 HC ICU INTERMEDIATE R&B

## 2020-05-14 RX ORDER — BISACODYL 10 MG
10 SUPPOSITORY, RECTAL RECTAL DAILY
Status: DISCONTINUED | OUTPATIENT
Start: 2020-05-14 | End: 2020-06-02 | Stop reason: HOSPADM

## 2020-05-14 RX ORDER — LORAZEPAM 2 MG/ML
1 INJECTION INTRAMUSCULAR EVERY 4 HOURS PRN
Status: DISCONTINUED | OUTPATIENT
Start: 2020-05-14 | End: 2020-05-31

## 2020-05-14 RX ADMIN — LORAZEPAM 1 MG: 2 INJECTION INTRAMUSCULAR at 23:15

## 2020-05-14 RX ADMIN — AMITRIPTYLINE HYDROCHLORIDE 25 MG: 25 TABLET, FILM COATED ORAL at 20:35

## 2020-05-14 RX ADMIN — SODIUM CHLORIDE, PRESERVATIVE FREE 10 ML: 5 INJECTION INTRAVENOUS at 09:35

## 2020-05-14 RX ADMIN — MORPHINE SULFATE 2 MG: 2 INJECTION, SOLUTION INTRAMUSCULAR; INTRAVENOUS at 09:11

## 2020-05-14 RX ADMIN — METOCLOPRAMIDE 10 MG: 5 INJECTION, SOLUTION INTRAMUSCULAR; INTRAVENOUS at 23:14

## 2020-05-14 RX ADMIN — METOCLOPRAMIDE 10 MG: 5 INJECTION, SOLUTION INTRAMUSCULAR; INTRAVENOUS at 05:14

## 2020-05-14 RX ADMIN — MORPHINE SULFATE 2 MG: 2 INJECTION, SOLUTION INTRAMUSCULAR; INTRAVENOUS at 20:35

## 2020-05-14 RX ADMIN — FUROSEMIDE 20 MG: 10 INJECTION, SOLUTION INTRAMUSCULAR; INTRAVENOUS at 09:34

## 2020-05-14 RX ADMIN — METOCLOPRAMIDE 10 MG: 5 INJECTION, SOLUTION INTRAMUSCULAR; INTRAVENOUS at 09:09

## 2020-05-14 RX ADMIN — CLONAZEPAM 0.5 MG: 0.5 TABLET ORAL at 09:11

## 2020-05-14 RX ADMIN — TAMSULOSIN HYDROCHLORIDE 0.4 MG: 0.4 CAPSULE ORAL at 09:11

## 2020-05-14 RX ADMIN — GABAPENTIN 300 MG: 300 CAPSULE ORAL at 09:10

## 2020-05-14 RX ADMIN — BUSPIRONE HYDROCHLORIDE 20 MG: 10 TABLET ORAL at 13:03

## 2020-05-14 RX ADMIN — ENOXAPARIN SODIUM 40 MG: 40 INJECTION SUBCUTANEOUS at 09:10

## 2020-05-14 RX ADMIN — MORPHINE SULFATE 2 MG: 2 INJECTION, SOLUTION INTRAMUSCULAR; INTRAVENOUS at 01:21

## 2020-05-14 RX ADMIN — AMITRIPTYLINE HYDROCHLORIDE 25 MG: 25 TABLET, FILM COATED ORAL at 13:03

## 2020-05-14 RX ADMIN — GABAPENTIN 300 MG: 300 CAPSULE ORAL at 13:03

## 2020-05-14 RX ADMIN — POTASSIUM CHLORIDE: 2 INJECTION, SOLUTION, CONCENTRATE INTRAVENOUS at 17:15

## 2020-05-14 RX ADMIN — CLONAZEPAM 0.5 MG: 0.5 TABLET ORAL at 20:36

## 2020-05-14 RX ADMIN — BUSPIRONE HYDROCHLORIDE 20 MG: 10 TABLET ORAL at 09:10

## 2020-05-14 RX ADMIN — QUETIAPINE FUMARATE 100 MG: 100 TABLET ORAL at 20:35

## 2020-05-14 RX ADMIN — LORAZEPAM 0.5 MG: 2 INJECTION INTRAMUSCULAR at 13:03

## 2020-05-14 RX ADMIN — SODIUM CHLORIDE, PRESERVATIVE FREE 10 ML: 5 INJECTION INTRAVENOUS at 20:43

## 2020-05-14 RX ADMIN — METOPROLOL TARTRATE 5 MG: 1 INJECTION, SOLUTION INTRAVENOUS at 05:15

## 2020-05-14 RX ADMIN — METOCLOPRAMIDE 10 MG: 5 INJECTION, SOLUTION INTRAMUSCULAR; INTRAVENOUS at 17:21

## 2020-05-14 RX ADMIN — GABAPENTIN 300 MG: 300 CAPSULE ORAL at 20:35

## 2020-05-14 RX ADMIN — LORAZEPAM 0.5 MG: 2 INJECTION INTRAMUSCULAR at 09:11

## 2020-05-14 RX ADMIN — Medication 10 ML: at 09:34

## 2020-05-14 RX ADMIN — METOPROLOL TARTRATE 5 MG: 1 INJECTION, SOLUTION INTRAVENOUS at 09:10

## 2020-05-14 RX ADMIN — BUSPIRONE HYDROCHLORIDE 20 MG: 10 TABLET ORAL at 20:35

## 2020-05-14 RX ADMIN — MORPHINE SULFATE 2 MG: 2 INJECTION, SOLUTION INTRAMUSCULAR; INTRAVENOUS at 17:05

## 2020-05-14 RX ADMIN — METOPROLOL TARTRATE 5 MG: 1 INJECTION, SOLUTION INTRAVENOUS at 20:36

## 2020-05-14 RX ADMIN — TRAZODONE HYDROCHLORIDE 100 MG: 100 TABLET ORAL at 20:35

## 2020-05-14 RX ADMIN — PANTOPRAZOLE SODIUM 40 MG: 40 INJECTION, POWDER, LYOPHILIZED, FOR SOLUTION INTRAVENOUS at 09:09

## 2020-05-14 RX ADMIN — MORPHINE SULFATE 2 MG: 2 INJECTION, SOLUTION INTRAMUSCULAR; INTRAVENOUS at 13:03

## 2020-05-14 RX ADMIN — AMITRIPTYLINE HYDROCHLORIDE 25 MG: 25 TABLET, FILM COATED ORAL at 09:11

## 2020-05-14 RX ADMIN — LORAZEPAM 0.5 MG: 2 INJECTION INTRAMUSCULAR at 01:18

## 2020-05-14 RX ADMIN — ESCITALOPRAM OXALATE 20 MG: 10 TABLET ORAL at 09:10

## 2020-05-14 ASSESSMENT — PAIN SCALES - GENERAL
PAINLEVEL_OUTOF10: 7
PAINLEVEL_OUTOF10: 7
PAINLEVEL_OUTOF10: 0
PAINLEVEL_OUTOF10: 8
PAINLEVEL_OUTOF10: 7
PAINLEVEL_OUTOF10: 7
PAINLEVEL_OUTOF10: 3
PAINLEVEL_OUTOF10: 4
PAINLEVEL_OUTOF10: 3

## 2020-05-14 ASSESSMENT — ENCOUNTER SYMPTOMS
NAUSEA: 0
DIARRHEA: 0
SHORTNESS OF BREATH: 0
SINUS PAIN: 0
VOMITING: 0
ABDOMINAL DISTENTION: 1
ABDOMINAL PAIN: 1
SINUS PRESSURE: 0
WHEEZING: 0
CONSTIPATION: 1
COUGH: 0

## 2020-05-14 NOTE — PROGRESS NOTES
iNurka Gamble [facility]    Progress Note    5/14/2020    8:12 AM    Name:   Ruy Ching  MRN:     [id number]     Acct:      [de-identified]   Room:   [address] [place] [address] Day:  2  Admit Date:  5/12/2020  3:19 PM    PCP:   Colby Wallace MD  Code Status:  Full Code    Subjective:     C/C: intractable nausea and vomiting  Interval History Status: not changed. Patient seen and evaluated in room sitting in bed in no acute distress. She is anxious at baseline. Vitals are stable. Awaiting general surgery's input for further diagnostic imaging. Continue n.p.o. with IV fluids for now. PEG tube to gravity. Only p.o. intake should be oral medication at this time    Brief History:     Per records:    Ruy Ching is a 76 y.o. Non-/non  female who presents with No chief complaint on file. and is admitted to the hospital for the management of SBO  Patient transferred to our facility from [facility] with possibility of bowel obstruction, 2 months ago she had prolonged extensive hospital stay, at the end of February had a Nissen fundoplication with possible aspiration issues and was admitted and stayed in the hospital for 37 days during that time she was intubated twice had hematemesis and had EGD that showed gastric ulcer and was seen by a specialist and after long course she was successfully transitioned to a trach and PEG and discharged to [facility] on 4/6. This time the patient has been having increasing abdominal pain nausea and vomiting over that has been worsening since she was discharged over at [facility]  Patient had ongoing issues since then, had SBO that resolved , since then she has an issue  with tolerating TF,  a PICC line placed on 4/25 at [facility] and was startd TPN , given she had persistent N/V, TF held and  PEG tube has been down to gravity for the last couple of days.   She also has been treated for UTI and finishing a course of Zyvox ordered by ID. Anxiety continues overnight  VSS- modified barium   COVID swab  esogram possible if warranted by VSS; and possible peg tube eval  Fevers overnight prompting blood cultures x2 urinalysis with culture all pending at current time    Review of Systems:     Review of Systems   Constitutional: Positive for activity change and appetite change. Negative for chills, fatigue and fever. HENT: Negative for congestion, sinus pressure and sinus pain. Eyes: Negative for visual disturbance. Respiratory: Negative for cough, shortness of breath and wheezing. Cardiovascular: Negative for chest pain, palpitations and leg swelling. Gastrointestinal: Positive for abdominal distention, abdominal pain and constipation. Negative for diarrhea, nausea and vomiting. Genitourinary: Positive for difficulty urinating and urgency. Skin: Negative for rash and wound. Neurological: Negative for dizziness, weakness and headaches. Hematological: Negative for adenopathy. Psychiatric/Behavioral: Negative for confusion. Medications: Allergies:     Allergies   Allergen Reactions    Prednisone Anxiety    Compazine [Prochlorperazine Maleate]     Penicillin V Potassium     Penicillins Other (See Comments)     shock       Current Meds:   Scheduled Meds:    bisacodyl  10 mg Rectal Daily    clonazePAM  0.5 mg Oral BID    QUEtiapine  100 mg Oral Nightly    traZODone  100 mg Oral Nightly    tamsulosin  0.4 mg Oral Daily    fat emulsion  250 mL Intravenous Daily    metoclopramide  10 mg Intravenous Q6H    metoprolol  5 mg Intravenous Q4H    furosemide  20 mg Intravenous Daily    sodium chloride flush  10 mL Intravenous 2 times per day    enoxaparin  40 mg Subcutaneous Daily    pantoprazole  40 mg Intravenous Daily    And    sodium chloride (PF)  10 mL Intravenous Daily    amitriptyline  25 mg Oral TID    busPIRone  20 mg Oral TID    escitalopram  20 mg Oral Daily    gabapentin  300 mg Oral TID Pulses: Normal pulses. Heart sounds: Normal heart sounds. No murmur. Pulmonary:      Effort: Pulmonary effort is normal.      Breath sounds: Normal breath sounds. No wheezing or rhonchi. Abdominal:      General: There is no distension. Palpations: Abdomen is soft. Tenderness: There is abdominal tenderness. There is no guarding. Comments: Hypoactive bowel sounds   Musculoskeletal: Normal range of motion. Right lower leg: No edema. Left lower leg: No edema. Comments: Generalized weakness   Lymphadenopathy:      Cervical: No cervical adenopathy. Skin:     General: Skin is warm and dry. Capillary Refill: Capillary refill takes less than 2 seconds. Coloration: Skin is pale. Neurological:      General: No focal deficit present. Mental Status: She is alert and oriented to person, place, and time. Psychiatric:      Comments: Anxious         Assessment:        Hospital Problems           Last Modified POA    * (Principal) Bowel obstruction ([address] 5/12/2020 Yes    MVP (mitral valve prolapse) 5/12/2020 Yes    Centrilobular emphysema ([address] 5/12/2020 Yes    Recurrent UTI 5/12/2020 Yes    Overview Signed 5/12/2020  5:22 PM by MD Dr. Ciaran Rick         Tracheostomy in place [facility]) 5/12/2020 Yes    H/O gastric ulcer 5/12/2020 Yes    Hyperlipidemia 5/12/2020 Yes    Hypertension 5/12/2020 Yes    Tobacco abuse 5/12/2020 Yes    Chronic respiratory failure with hypoxia ([address] 5/12/2020 Yes    Status post insertion of percutaneous endoscopic gastrostomy (PEG) tube ([address] 5/12/2020 Yes    S/P Nissen fundoplication (without gastrostomy tube) procedure 5/12/2020 Yes          Plan:        1. Intractable nausea and vomiting: Peg to gravity, reglan scheduled. NPO except medications, IVF. Gen surg consult. Hold off on any ABD imaging for now anad continue to reassess  2. S/P nissen fundoplication in 7/7545, consult gen surg- Dr. Iva Mendosa for management.  Video swallow study discontinued yesterday. Plan for barium SS today the esogram- needs covid swab prior to barium SS  3. Anxiety: guarded. PO medications have been resumed and ativan IVP for added relief. Continue to assess  4. Recurrent UTI: with recent completion of zyvox at [facility] per ID recs. Consult ID for follow up  5. HTN: Stable, continue current medications   6. HLD: continue statin therapy  7. Chronic resp failure: stable, chronic trach  8.  H/O gastric ulceration: monitor H & H  9. GI/DVT proph: PPI BID, lovenox    Mariluz Almodovar, APRN - NP  5/14/2020  8:12 AM

## 2020-05-14 NOTE — PROGRESS NOTES
Nutrition Assessment (Parenteral Nutrition)    Type and Reason for Visit: Reassess    Nutrition Recommendations:   -Continue NPO status  -Recommend increasing PN 2-in-1 custom central @ 50 mL/hr x 24 hrs (1200 mLs) + 200 gms dextrose + 75 gms AA ~> 980 kcals, 75 gms protein -- With D5 @ 100 mL/hr ~> 1388 kcals/d  -Recommend d/c IV lipids 2/2 elevated TG's; will rech TG in a few days  -Recommend increasing NaCl and omitting KPhos in net bag   -Recommend to d/c D5 @ 100 mL/hr now that pt is started on TPN  -Will monitor TPN and labs     Nutrition Assessment: Pt improving from a nutritional standpoint aeb TPN infusing. Pt remains NPO - swallow study pending; awaiting on COVID-19 results. Pt noted w/ BM today. Pt noted w/ trach and PEG tube. D5 running @ 100 mL/hr. Pt noted w/ elevated TG's at 369 - will d/c IV lipids for tonight and recheck TG in a few days. Will continue TPN and monitor. Malnutrition Assessment:  · Malnutrition Status: At risk for malnutrition  · Context: Acute illness or injury  · Findings of the 6 clinical characteristics of malnutrition (Minimum of 2 out of 6 clinical characteristics is required to make the diagnosis of moderate or severe Protein Calorie Malnutrition based on AND/ASPEN Guidelines):  1. Energy Intake-Greater than 75% of estimated energy requirement, Greater than or equal to 1 month(w/ TPN)    2. Weight Loss-No significant weight loss,    3. Fat Loss-No significant subcutaneous fat loss,    4. Muscle Loss-No significant muscle mass loss,    5. Fluid Accumulation-No significant fluid accumulation,    6.   Strength-Not measured    Nutrition Risk Level: High    Nutrient Needs:  · Estimated Daily Total Kcal: 1.2-1.3 ~> 7519-2563 kcals/d   · Estimated Daily Protein (g): 1.2-1.4 gm/kg ~> 60-75 gms/d     Nutrition Diagnosis:   · Problem: Inadequate oral intake  · Etiology: related to Impaired respiratory function-inability to consume food, Alteration in GI function(Trach)  Signs and symptoms:  as evidenced by NPO status due to medical condition, Nutrition support - PN    Objective Information:  · Nutrition-Focused Physical Findings: labs/meds reviewed   · Wound Type: None  · Current Nutrition Therapies:  · Oral Diet Orders: NPO   · Parenteral Nutrition Orders:  · Type and Formula: 2-in-1 Custom   · Lipids: 250ml, Daily  · Rate/Volume: 50 mL/hr; 1200 mLs   · Duration: Continuous  · Current PN Order Provides: 130 gms dextrose + 75 gms AA ~> 1259 kcals, 75 gms protein (w/ D5 @ 100 mL/hr ~> 1667 kcals/d)  · Goal PN Orders Provides: 200 gms dextrose + 75 gms AA + 0 gms lipids ~> 980 kcals, 75 gms protein (w/ D5 @ 100 mL/hr ~> 1388 kcals/d)  · Additional Calories: D5 @ 100 ml/hr ~> 408 kcals   · Anthropometric Measures:  · Ht: 5' 3\" (160 cm)   · Current Body Wt: 181 lb (82.1 kg)  · Admission Body Wt: 181 lb (82.1 kg)  · Usual Body Wt: 158 lb (71.7 kg)(per pt )  · % Weight Change:  ,  179-186 lbs over 3 mo per EMR   · Ideal Body Wt: 115 lb (52.2 kg), % Ideal Body 157% adm/ideal  · BMI Classification: BMI 30.0 - 34.9 Obese Class I    Nutrition Interventions:   Continue NPO, Modify current Parenteral Nutrition  Continued Inpatient Monitoring, Education Not Indicated    Nutrition Evaluation:   · Evaluation: Goal achieved   · Goals: Pt to meet % of est'd needs via TPN   · Monitoring: Nutrition Progression, PN Intake, PN Tolerance, Skin Integrity, I&O, Weight, Pertinent Labs, Monitor Bowel Function      Electronically signed by Carla Craig RD, LD on 5/14/20 at 2:07 PM EDT    Contact Number:[phone]

## 2020-05-14 NOTE — CARE COORDINATION
Spoke with Winifred Quiñones at [facility], they are holding her bed as a [facility] bed, he is following and they are planning to take patient back there.  Goal is [facility]

## 2020-05-14 NOTE — PROGRESS NOTES
[facility] - Progress Note    Today's Date and Time: 5/14/2020, 3:36 PM    Impression :   1. Acute respiratory failure status post intubation 3/17, trach placement 3/27, Replacement in OR 4/2/2020  2. Status post PEG tube placement 3-27-20  3. Peptic ulcer disease  4. Status post large hiatal hernia repair and Nissen semi-fundoplication 5/29/1244  5. Pulmonary edema- Resolved  6. Pleural effusions  7. Acute kidney injury- Resolved  8. Persistent Nausea and intermittent vomiting  9. Abdominal pain  10. Reactive Leukocytosis    Recommendations:   · Antibiotic modifications:             Completed IV meropenem stop date 4/15//2020  ·         Discontinued Diflucan 400 mg p.o. x7 days stop date 3/26/2020  -         Completed Diflucan 200 mg po x 5 days. Stop date 5-10-20   Supportive care  · Monitor clinical progression. Medical Decision Making/Summary/Discussion:5/14/2020     ·   Infection Control Recommendations   · Elizabeth Precautions    Antimicrobial Stewardship Recommendations     · Discontinuation of therapy  Coordination of Outpatient Care:     · Estimated Length of IV antimicrobials:None  · Patient will need Midline Catheter Insertion: No  · Patient will need PICC line Insertion:No  · Patient will [facility]: [facility] , [facility],  SNF: TBD  · Patient will need outpatient wound care:Yes  Chief complaint/reason for consultation:   · Intraabdominal infection  · Pneumonia      History of Present Illness:   Zahida Smith is a 76y.o.-year-old  female who was initially admitted on 5/12/2020. Patient seen at the request of Krys Dixon. INITIAL EVALUATION:     Patient known to my service from prior evaluation at [facility].     Patient has a history of severe GERD despite being on PPI, H2 blocker and Tums. She underwent laparoscopic robotic hiatal hernia repair and fundoplication last month on 02/24/2020 and postoperatively she developed hypoxia.  CT chest done on 02/26/2020 showed bibasilar atelectasis/consolidation and patchy area of infiltrate in the right upper lobe. Patient was discharged on home oxygen on 2-28.     She presented back to [facility] on 2-29 with worsening shortness of breath, dyspnea as well as an episode of dark red emesis. Her SaO2 was 60 % in the ED. There was concern of aspiration. CTA chest done was negative for PE, showed bilateral pleural effusions and severe esophageal dilatation, surgery was consulted. Patient was transferred to [facility] for further management.     Patient was started on noninvasive ventilation and then intubated and admitted to the ICU. She improved, was extubated on 3-7 to high flow O2. Pt was transferred out of the ICU on 3-13.     On 3-14 pts hgb dropped to 6.3 and she developed coffee-ground then bright red emesis, and was again transferred back to medical ICU.     General surgery performed endoscopy on 3/17 which showed an ulcer at the GE junction and mild gastritis with no active bleeding. After the procedure she developed acute respiratory distress and a fever of 39.8. She was emergently intubated and started on Meropenem and Vancomycin.      ID is consulted for antibiotic management. Patient was continued on meropenem. Vancomycin D/C     S/P trach and PEG placement 3/27     Flat plate of abdomen 8-25-89 with nonobstructive gas pattern with retained enteric contrast in the colon  On TPN. Patient could not tolerate tube feeds. Has superficial venous clot Rt Mid forearm to antecubital      Tracheal exchange in OR done on 4-2-20. CURRENT EVALUATION: 5/14/2020      Patient transferred to [facility] on 4-6-20. Transferred back to [facility] on 5-12-20 because of persistent vomiting, abdominal pain. Concern for possible SBO.     Afebrile  VS stable RR 21-24  Remains very anxious. Receiving multiple medications to help with anxiety.     Tracheal collar  Tracheal secretions scant, whitish.    Sputum culture Normal teresa on 4-28-20  Sputum Surgical  History:     Past Surgical History:   Procedure Laterality Date    APPENDECTOMY      CARPAL TUNNEL RELEASE      COLONOSCOPY      CYSTOSCOPY      EGD  3/17/2020         EYE SURGERY      patricia IOL    GASTRIC FUNDOPLICATION N/A 7/95/5063    XI LAPAROSCOPIC ROBOTIC HIATAL HERNIA REPAIR, CHERYL FUNDOPLICATION performed by Mejia Gonsales MD at [facility] N/A 3/27/2020    EGD PEG TUBE PLACEMENT performed by eMjia Gonsales MD at [address] CATH POWER PICC TRIPLE  3/4/2020         HIATAL HERNIA REPAIR  02/24/2020    LAPAROSCOPIC ROBOTIC HIATAL HERNIA REPAIR, CHERYL FUNDOPLICATION     HYSTERECTOMY      Abdominal    JOINT REPLACEMENT Right     right hip    OVARY REMOVAL      RHINOPLASTY      TONSILLECTOMY      TOTAL HIP ARTHROPLASTY      TOTAL NEPHRECTOMY      atrophic from infections, age 13   Danya Alfonsoas TRACHEOSTOMY N/A 3/27/2020    **ADD ON **WANTS 10:00AM **TRACHEOTOMY performed by Mejia Gonsales MD at [address] N/A 4/2/2020    TRACHEOTOMY EXCHANGE performed by Mejia Gonsales MD at [address] [place] [address] ENDOSCOPY N/A 3/17/2020    BEDSIDE EGD ESOPHAGOGASTRODUODENOSCOPY (ICU) performed by Mejia Gonsales MD at [facility]       Medications:      bisacodyl  10 mg Rectal Daily    clonazePAM  0.5 mg Oral BID    QUEtiapine  100 mg Oral Nightly    traZODone  100 mg Oral Nightly    tamsulosin  0.4 mg Oral Daily    metoclopramide  10 mg Intravenous Q6H    metoprolol  5 mg Intravenous Q4H    furosemide  20 mg Intravenous Daily    sodium chloride flush  10 mL Intravenous 2 times per day    enoxaparin  40 mg Subcutaneous Daily    pantoprazole  40 mg Intravenous Daily    And    sodium chloride (PF)  10 mL Intravenous Daily    amitriptyline  25 mg Oral TID    busPIRone  20 mg Oral TID    escitalopram  20 mg Oral Daily    gabapentin  300 mg Oral TID       Social History:     Social History Socioeconomic History    Marital status:      Spouse name: Not on file    Number of children: 2    Years of education: Not on file    Highest education level: Not on file   Occupational History    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   Social Needs    Financial resource strain: Not on file    Food insecurity     Worry: Not on file     Inability: Not on file   [employer] needs     Medical: Not on file     Non-medical: Not on file   Tobacco Use    Smoking status: Current Every Day Smoker     Packs/day: 1.00     Years: 50.00     Pack years: 50.00     Types: Cigarettes    Smokeless tobacco: Never Used   Substance and Sexual Activity    Alcohol use: No    Drug use: No    Sexual activity: Never   Lifestyle    Physical activity     Days per week: Not on file     Minutes per session: Not on file    Stress: Not on file   Relationships    Social connections     Talks on phone: Not on file     Gets together: Not on file     Attends Sabianism service: Not on file     Active member of club or organization: Not on file     Attends meetings of clubs or organizations: Not on file     Relationship status: Not on file    Intimate partner violence     Fear of current or ex partner: Not on file     Emotionally abused: Not on file     Physically abused: Not on file     Forced sexual activity: Not on file   Other Topics Concern    Not on file   Social History Narrative    Not on file       Family History:     Family History   Problem Relation Age of Onset    Cancer Mother         uterine    Heart Attack Mother     Heart Attack Father     Other Maternal Grandfather         foot gangrene    Other Paternal Grandmother         bleeding ulcers    Other Paternal Grandfather         lung cancer        Allergies:   Prednisone; Compazine [prochlorperazine maleate]; Penicillin v potassium; and Penicillins     Review of Systems:   Constitutional: No fevers or chills.  No systemic complaints  Head: No headaches  Eyes: No double vision or blurry vision. No conjunctival inflammation. ENT: No sore throat or runny nose. . No hearing loss, tinnitus or vertigo. Cardiovascular: No chest pain or palpitations. No shortness of breath. No HOLLAND  Lung: No shortness of breath or cough. No sputum production  Abdomen: No nausea, vomiting, diarrhea, or abdominal pain. Marijean Fallow No cramps. Genitourinary: No increased urinary frequency, or dysuria. No hematuria. No suprapubic or CVA pain  Musculoskeletal: No muscle aches or pains. No joint effusions, swelling or deformities  Hematologic: No bleeding or bruising. Neurologic: No headache, weakness, numbness, or tingling. Integument: No rash, no ulcers. Psychiatric: No depression. Endocrine: No polyuria, no polydipsia, no polyphagia. Physical Examination :     Patient Vitals for the past 8 hrs:   BP Temp Pulse Resp SpO2   05/14/20 1530 -- -- 89 22 96 %   05/14/20 1145 118/65 97.9 °F (36.6 °C) 85 20 --   05/14/20 1130 -- -- 78 21 97 %     General Appearance: Awake, alert, and in no apparent distress  Head:  Normocephalic, no trauma  Eyes: Pupils equal, round, reactive to light and accommodation; extraocular movements intact; sclera anicteric; conjunctivae pink. No embolic phenomena. ENT: Oropharynx clear, without erythema, exudate, or thrush. No tenderness of sinuses. Mouth/throat: mucosa pink and moist. No lesions. Dentition in good repair. Neck:Supple, without lymphadenopathy. Thyroid normal, No bruits. Tracheostomy in place. Pulmonary/Chest: Clear to auscultation, without wheezes, rales, or rhonchi. No dullness to percussion. Cardiovascular: Regular rate and rhythm without murmurs, rubs, or gallops. Abdomen: Soft, non tender. Bowel sounds normal. No organomegaly. PEG in place. Surgical incisions without inflammation. All four Extremities: No cyanosis, clubbing, edema, or effusions. Neurologic: No gross sensory or motor deficits. Skin: Warm and dry with good turgor. No signs of peripheral

## 2020-05-15 ENCOUNTER — APPOINTMENT (OUTPATIENT)
Dept: GENERAL RADIOLOGY | Age: 75
DRG: 326 | End: 2020-05-15
Attending: FAMILY MEDICINE
Payer: MEDICARE

## 2020-05-15 LAB
ABSOLUTE EOS #: 0.17 K/UL (ref 0–0.44)
ABSOLUTE IMMATURE GRANULOCYTE: 0.23 K/UL (ref 0–0.3)
ABSOLUTE LYMPH #: 1.39 K/UL (ref 1.1–3.7)
ABSOLUTE MONO #: 1.07 K/UL (ref 0.1–1.2)
ANION GAP SERPL CALCULATED.3IONS-SCNC: 13 MMOL/L (ref 9–17)
BASOPHILS # BLD: 1 % (ref 0–2)
BASOPHILS ABSOLUTE: 0.06 K/UL (ref 0–0.2)
BUN BLDV-MCNC: 30 MG/DL (ref 8–23)
BUN/CREAT BLD: ABNORMAL (ref 9–20)
CALCIUM SERPL-MCNC: 10.9 MG/DL (ref 8.6–10.4)
CHLORIDE BLD-SCNC: 99 MMOL/L (ref 98–107)
CO2: 26 MMOL/L (ref 20–31)
CREAT SERPL-MCNC: 0.76 MG/DL (ref 0.5–0.9)
DIFFERENTIAL TYPE: ABNORMAL
EOSINOPHILS RELATIVE PERCENT: 2 % (ref 1–4)
GFR AFRICAN AMERICAN: >60 ML/MIN
GFR NON-AFRICAN AMERICAN: >60 ML/MIN
GFR SERPL CREATININE-BSD FRML MDRD: ABNORMAL ML/MIN/{1.73_M2}
GFR SERPL CREATININE-BSD FRML MDRD: ABNORMAL ML/MIN/{1.73_M2}
GLUCOSE BLD-MCNC: 122 MG/DL (ref 65–105)
GLUCOSE BLD-MCNC: 126 MG/DL (ref 65–105)
GLUCOSE BLD-MCNC: 129 MG/DL (ref 70–99)
HCT VFR BLD CALC: 32.1 % (ref 36.3–47.1)
HEMOGLOBIN: 9.5 G/DL (ref 11.9–15.1)
IMMATURE GRANULOCYTES: 3 %
LYMPHOCYTES # BLD: 16 % (ref 24–43)
MAGNESIUM: 2.1 MG/DL (ref 1.6–2.6)
MCH RBC QN AUTO: 26.9 PG (ref 25.2–33.5)
MCHC RBC AUTO-ENTMCNC: 29.6 G/DL (ref 28.4–34.8)
MCV RBC AUTO: 90.9 FL (ref 82.6–102.9)
MONOCYTES # BLD: 12 % (ref 3–12)
NRBC AUTOMATED: 0 PER 100 WBC
PDW BLD-RTO: 17.9 % (ref 11.8–14.4)
PHOSPHORUS: 3.6 MG/DL (ref 2.6–4.5)
PLATELET # BLD: 342 K/UL (ref 138–453)
PLATELET ESTIMATE: ABNORMAL
PMV BLD AUTO: 10.5 FL (ref 8.1–13.5)
POTASSIUM SERPL-SCNC: 4.4 MMOL/L (ref 3.7–5.3)
RBC # BLD: 3.53 M/UL (ref 3.95–5.11)
RBC # BLD: ABNORMAL 10*6/UL
SEG NEUTROPHILS: 66 % (ref 36–65)
SEGMENTED NEUTROPHILS ABSOLUTE COUNT: 6 K/UL (ref 1.5–8.1)
SODIUM BLD-SCNC: 138 MMOL/L (ref 135–144)
WBC # BLD: 8.9 K/UL (ref 3.5–11.3)
WBC # BLD: ABNORMAL 10*3/UL

## 2020-05-15 PROCEDURE — 6370000000 HC RX 637 (ALT 250 FOR IP): Performed by: NURSE PRACTITIONER

## 2020-05-15 PROCEDURE — 6370000000 HC RX 637 (ALT 250 FOR IP): Performed by: FAMILY MEDICINE

## 2020-05-15 PROCEDURE — 2060000000 HC ICU INTERMEDIATE R&B

## 2020-05-15 PROCEDURE — 6360000002 HC RX W HCPCS: Performed by: NURSE PRACTITIONER

## 2020-05-15 PROCEDURE — 2500000003 HC RX 250 WO HCPCS: Performed by: NURSE PRACTITIONER

## 2020-05-15 PROCEDURE — 83735 ASSAY OF MAGNESIUM: CPT

## 2020-05-15 PROCEDURE — 99232 SBSQ HOSP IP/OBS MODERATE 35: CPT | Performed by: FAMILY MEDICINE

## 2020-05-15 PROCEDURE — 97530 THERAPEUTIC ACTIVITIES: CPT

## 2020-05-15 PROCEDURE — C9113 INJ PANTOPRAZOLE SODIUM, VIA: HCPCS | Performed by: NURSE PRACTITIONER

## 2020-05-15 PROCEDURE — 82947 ASSAY GLUCOSE BLOOD QUANT: CPT

## 2020-05-15 PROCEDURE — 36415 COLL VENOUS BLD VENIPUNCTURE: CPT

## 2020-05-15 PROCEDURE — 97535 SELF CARE MNGMENT TRAINING: CPT

## 2020-05-15 PROCEDURE — 97110 THERAPEUTIC EXERCISES: CPT

## 2020-05-15 PROCEDURE — 6360000004 HC RX CONTRAST MEDICATION: Performed by: SURGERY

## 2020-05-15 PROCEDURE — 94761 N-INVAS EAR/PLS OXIMETRY MLT: CPT

## 2020-05-15 PROCEDURE — 74230 X-RAY XM SWLNG FUNCJ C+: CPT

## 2020-05-15 PROCEDURE — 80048 BASIC METABOLIC PNL TOTAL CA: CPT

## 2020-05-15 PROCEDURE — 74220 X-RAY XM ESOPHAGUS 1CNTRST: CPT

## 2020-05-15 PROCEDURE — 6370000000 HC RX 637 (ALT 250 FOR IP): Performed by: STUDENT IN AN ORGANIZED HEALTH CARE EDUCATION/TRAINING PROGRAM

## 2020-05-15 PROCEDURE — 85025 COMPLETE CBC W/AUTO DIFF WBC: CPT

## 2020-05-15 PROCEDURE — 92611 MOTION FLUOROSCOPY/SWALLOW: CPT

## 2020-05-15 PROCEDURE — 74240 X-RAY XM UPR GI TRC 1CNTRST: CPT

## 2020-05-15 PROCEDURE — 84100 ASSAY OF PHOSPHORUS: CPT

## 2020-05-15 PROCEDURE — 2580000003 HC RX 258: Performed by: NURSE PRACTITIONER

## 2020-05-15 PROCEDURE — 2500000003 HC RX 250 WO HCPCS: Performed by: FAMILY MEDICINE

## 2020-05-15 PROCEDURE — 2700000000 HC OXYGEN THERAPY PER DAY

## 2020-05-15 PROCEDURE — 99232 SBSQ HOSP IP/OBS MODERATE 35: CPT | Performed by: INTERNAL MEDICINE

## 2020-05-15 PROCEDURE — 99232 SBSQ HOSP IP/OBS MODERATE 35: CPT | Performed by: SURGERY

## 2020-05-15 RX ORDER — FUROSEMIDE 20 MG/1
20 TABLET ORAL DAILY
Status: DISCONTINUED | OUTPATIENT
Start: 2020-05-15 | End: 2020-06-02 | Stop reason: HOSPADM

## 2020-05-15 RX ADMIN — LORAZEPAM 1 MG: 2 INJECTION INTRAMUSCULAR at 12:05

## 2020-05-15 RX ADMIN — FUROSEMIDE 20 MG: 20 TABLET ORAL at 12:14

## 2020-05-15 RX ADMIN — AMITRIPTYLINE HYDROCHLORIDE 25 MG: 25 TABLET, FILM COATED ORAL at 19:52

## 2020-05-15 RX ADMIN — MORPHINE SULFATE 2 MG: 2 INJECTION, SOLUTION INTRAMUSCULAR; INTRAVENOUS at 13:24

## 2020-05-15 RX ADMIN — GABAPENTIN 300 MG: 300 CAPSULE ORAL at 13:18

## 2020-05-15 RX ADMIN — SODIUM CHLORIDE, PRESERVATIVE FREE 10 ML: 5 INJECTION INTRAVENOUS at 09:00

## 2020-05-15 RX ADMIN — BUSPIRONE HYDROCHLORIDE 20 MG: 10 TABLET ORAL at 07:58

## 2020-05-15 RX ADMIN — PANTOPRAZOLE SODIUM 40 MG: 40 INJECTION, POWDER, LYOPHILIZED, FOR SOLUTION INTRAVENOUS at 07:59

## 2020-05-15 RX ADMIN — GABAPENTIN 300 MG: 300 CAPSULE ORAL at 07:58

## 2020-05-15 RX ADMIN — TAMSULOSIN HYDROCHLORIDE 0.4 MG: 0.4 CAPSULE ORAL at 07:59

## 2020-05-15 RX ADMIN — CLONAZEPAM 0.5 MG: 0.5 TABLET ORAL at 08:19

## 2020-05-15 RX ADMIN — IOHEXOL 40 ML: 240 INJECTION, SOLUTION INTRATHECAL; INTRAVASCULAR; INTRAVENOUS; ORAL at 14:18

## 2020-05-15 RX ADMIN — QUETIAPINE FUMARATE 100 MG: 100 TABLET ORAL at 20:04

## 2020-05-15 RX ADMIN — AMITRIPTYLINE HYDROCHLORIDE 25 MG: 25 TABLET, FILM COATED ORAL at 13:18

## 2020-05-15 RX ADMIN — Medication 10 ML: at 08:08

## 2020-05-15 RX ADMIN — SODIUM CHLORIDE, PRESERVATIVE FREE 10 ML: 5 INJECTION INTRAVENOUS at 20:05

## 2020-05-15 RX ADMIN — METOPROLOL TARTRATE 5 MG: 1 INJECTION, SOLUTION INTRAVENOUS at 12:13

## 2020-05-15 RX ADMIN — AMITRIPTYLINE HYDROCHLORIDE 25 MG: 25 TABLET, FILM COATED ORAL at 07:58

## 2020-05-15 RX ADMIN — IOHEXOL 60 ML: 240 INJECTION, SOLUTION INTRATHECAL; INTRAVASCULAR; INTRAVENOUS; ORAL at 14:21

## 2020-05-15 RX ADMIN — CALCIUM GLUCONATE: 98 INJECTION, SOLUTION INTRAVENOUS at 17:44

## 2020-05-15 RX ADMIN — CLONAZEPAM 0.5 MG: 0.5 TABLET ORAL at 19:52

## 2020-05-15 RX ADMIN — BUSPIRONE HYDROCHLORIDE 20 MG: 10 TABLET ORAL at 20:03

## 2020-05-15 RX ADMIN — GABAPENTIN 300 MG: 300 CAPSULE ORAL at 21:13

## 2020-05-15 RX ADMIN — BISACODYL 10 MG: 10 SUPPOSITORY RECTAL at 07:58

## 2020-05-15 RX ADMIN — METOCLOPRAMIDE 10 MG: 5 INJECTION, SOLUTION INTRAMUSCULAR; INTRAVENOUS at 06:09

## 2020-05-15 RX ADMIN — MORPHINE SULFATE 2 MG: 2 INJECTION, SOLUTION INTRAMUSCULAR; INTRAVENOUS at 16:27

## 2020-05-15 RX ADMIN — ENOXAPARIN SODIUM 40 MG: 40 INJECTION SUBCUTANEOUS at 07:59

## 2020-05-15 RX ADMIN — METOCLOPRAMIDE 10 MG: 5 INJECTION, SOLUTION INTRAMUSCULAR; INTRAVENOUS at 21:13

## 2020-05-15 RX ADMIN — ESCITALOPRAM OXALATE 20 MG: 10 TABLET ORAL at 07:59

## 2020-05-15 RX ADMIN — METOPROLOL TARTRATE 5 MG: 1 INJECTION, SOLUTION INTRAVENOUS at 06:09

## 2020-05-15 RX ADMIN — TRAZODONE HYDROCHLORIDE 100 MG: 100 TABLET ORAL at 21:13

## 2020-05-15 RX ADMIN — METOCLOPRAMIDE 10 MG: 5 INJECTION, SOLUTION INTRAMUSCULAR; INTRAVENOUS at 10:19

## 2020-05-15 RX ADMIN — LORAZEPAM 1 MG: 2 INJECTION INTRAMUSCULAR at 19:53

## 2020-05-15 RX ADMIN — MORPHINE SULFATE 2 MG: 2 INJECTION, SOLUTION INTRAMUSCULAR; INTRAVENOUS at 10:24

## 2020-05-15 RX ADMIN — LORAZEPAM 1 MG: 2 INJECTION INTRAMUSCULAR at 08:07

## 2020-05-15 RX ADMIN — METOCLOPRAMIDE 10 MG: 5 INJECTION, SOLUTION INTRAMUSCULAR; INTRAVENOUS at 16:25

## 2020-05-15 RX ADMIN — BUSPIRONE HYDROCHLORIDE 20 MG: 10 TABLET ORAL at 13:17

## 2020-05-15 ASSESSMENT — PAIN DESCRIPTION - PROGRESSION: CLINICAL_PROGRESSION: NOT CHANGED

## 2020-05-15 ASSESSMENT — PAIN DESCRIPTION - FREQUENCY
FREQUENCY: CONTINUOUS
FREQUENCY: CONTINUOUS

## 2020-05-15 ASSESSMENT — PAIN DESCRIPTION - LOCATION
LOCATION: ABDOMEN

## 2020-05-15 ASSESSMENT — PAIN DESCRIPTION - PAIN TYPE
TYPE: CHRONIC PAIN
TYPE: ACUTE PAIN
TYPE: CHRONIC PAIN

## 2020-05-15 ASSESSMENT — PAIN SCALES - GENERAL
PAINLEVEL_OUTOF10: 0
PAINLEVEL_OUTOF10: 7
PAINLEVEL_OUTOF10: 8
PAINLEVEL_OUTOF10: 0
PAINLEVEL_OUTOF10: 8
PAINLEVEL_OUTOF10: 5

## 2020-05-15 ASSESSMENT — PAIN DESCRIPTION - ORIENTATION
ORIENTATION: LOWER
ORIENTATION: LOWER

## 2020-05-15 ASSESSMENT — PAIN - FUNCTIONAL ASSESSMENT: PAIN_FUNCTIONAL_ASSESSMENT: PREVENTS OR INTERFERES SOME ACTIVE ACTIVITIES AND ADLS

## 2020-05-15 ASSESSMENT — PAIN DESCRIPTION - DESCRIPTORS
DESCRIPTORS: ACHING
DESCRIPTORS: ACHING

## 2020-05-15 ASSESSMENT — PAIN DESCRIPTION - ONSET: ONSET: ON-GOING

## 2020-05-15 NOTE — PROGRESS NOTES
admission. Treatment Diagnosis: Bowel Obstruction  Prognosis: Fair  OT Education: OT Role;Plan of Care;Transfer Training  Patient Education: purpose of evaluation, importance of activity, use of AD to assist with transfers, good return   REQUIRES OT FOLLOW UP: Yes  Safety Devices  Safety Devices in place: Yes  Type of devices: Left in bed;Call light within reach  Restraints  Initially in place: No       Patient Diagnosis(es): There were no encounter diagnoses. has a past medical history of Anxiety, Arthritis, Back pain, Bronchitis, Caffeine use, Carpal tunnel syndrome, Cataracts, bilateral, Constipation, Depression, Diarrhea, GERD (gastroesophageal reflux disease), H/O gastric ulcer, Hyperlipidemia, Hypertension, Mumps, MVP (mitral valve prolapse), Recurrent UTI, Sinusitis, Tracheostomy in place [facility]), Ulcerative esophagitis, and Wellness examination. has a past surgical history that includes Hysterectomy; total nephrectomy; Tonsillectomy; Appendectomy; Cystoscopy; Ovary removal; Tubal ligation; rhinoplasty; Carpal tunnel release; Hand surgery; Total hip arthroplasty; eye surgery; Colonoscopy; joint replacement (Right); hiatal hernia repair (02/24/2020); Gastric fundoplication (N/A, 9/54/1906); hc cath power picc triple (3/4/2020); EGD (3/17/2020); Upper gastrointestinal endoscopy (N/A, 3/17/2020); tracheostomy (N/A, 3/27/2020); Gastrostomy tube placement (N/A, 3/27/2020); and tracheostomy (N/A, 4/2/2020). Restrictions  Restrictions/Precautions  Restrictions/Precautions: Fall Risk  Required Braces or Orthoses?: No  Position Activity Restriction  Other position/activity restrictions: Up with assistance   Subjective   General  Patient assessed for rehabilitation services?: Yes  Family / Caregiver Present: No  Pain Assessment  Pain Assessment: 0-10  Pain Level: 5  Pain Type: Acute pain  Pain Location: Abdomen  Non-Pharmaceutical Pain Intervention(s): Distraction; Therapeutic presence;Repositioned  Vital Signs  Patient Currently in Pain: Yes  Oxygen Therapy  O2 Device: Trach mask  O2 Flow Rate (L/min): 5 L/min   Orientation  Orientation  Overall Orientation Status: Within Functional Limits  Objective    ADL  UE Bathing: Minimal assistance;Setup; Increased time to complete  UE Dressing: Minimal assistance;Setup(to manage gown )  Toileting: Maximum assistance  Additional Comments: Pt supine in bed on arrival. Pt assisted to complete bed mobility and sit at EOB. Pt required increased time and effort, reports frequent dizziness with activity. Pt denies dizzines throughout session with postional changes. Pt tolerated sitting EOB ~8 min to assist with simple grooming. Pt declined standing due to testing this afternoon that is scheduled and increased fatigue. Pt retired supine in bed, call light in reach       Balance  Sitting Balance: Contact guard assistance  Bed mobility  Rolling to Right: Moderate assistance  Supine to Sit: Moderate assistance  Sit to Supine: Moderate assistance  Scooting:  Moderate assistance  Comment: HOB raised, use of bed rails     Cognition  Overall Cognitive Status: WFL      Plan   Plan  Times per week: 3-5x/wk   Current Treatment Recommendations: Functional Mobility Training, Endurance Training, Safety Education & Training, Patient/Caregiver Education & Training, Equipment Evaluation, Education, & procurement, Self-Care / ADL     Goals  Short term goals  Time Frame for Short term goals: by discharge, pt will  Short term goal 1: demo I in UE ADL activities with set up  Short term goal 2: demo min A for LE ADL activities with set up   Short term goal 3: demo mod A for functional transfers/ mobility with use of RW to assist with ADL/ functional activities   Short term goal 4: demo increased standing toleranceof 5+ min to assist with ADL/ functional activities   Short term goal 5: demo understanding and I use of ECWS, fall prevention and proper pursed lipped breathing tech during functional activities Patient Goals   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

## 2020-05-15 NOTE — PROGRESS NOTES
Pt back from swallow study. Message Dr Evi Garcia and sx made aware of speech therapists recommendations for diet.

## 2020-05-15 NOTE — PROGRESS NOTES
[employer] sent to Dr Eddi Triplett to clarify need for D5 and 0.45 NS @20/hr. Pt is on TPN. Awaiting response.

## 2020-05-15 NOTE — PROGRESS NOTES
[facility] [L57.357] 05/12/2020    Tobacco abuse [Z72.0] 05/12/2020    Chronic respiratory failure with hypoxia [facility]) [J96.11] 05/12/2020    Status post insertion of percutaneous endoscopic gastrostomy (PEG) tube ([facility] [Z93.1] 05/12/2020    S/P Nissen fundoplication (without gastrostomy tube) procedure [Z98.890] 05/12/2020    Recurrent UTI [N39.0]     [facility] in place ([facility] [Z93.0]     H/O gastric ulcer [Z87.19]     Hyperlipidemia [E78.5]     Hypertension [I10]     Fever [R50.9]     Centrilobular emphysema ([facility] [J43.2]     MVP (mitral valve prolapse) [I34.1]        Plan:  1. Diet: NPO   2. IVF and TPN  3. Recommend swallow study today. If negative for aspiration --> will perform esophagram to evaluate the hiatal hernia repair if the esophagram appears normal--> we will plan for a PEGogram stomach is emptying and evaluate whether the stomach is emptying appropriately. 4. Medical management per primary team -- will follow continue to follow closely. Electronically signed by Tomás Núñez DO  on 5/15/2020 at 7:17 AM     Patient seen and examined. Agree with above A/P. Delay in work up due to [place] testing. Unsure as to why she is having so much trouble with vomiting. Work up as above and treatment will be based on findings.

## 2020-05-15 NOTE — PROCEDURES
the end of February had a Nissen fundoplication with possible aspiration issues and was admitted and stayed in the hospital for 37 days during that time she was intubated twice had hematemesis and had EGD that showed gastric ulcer and was seen by a specialist and after long course she was successfully transitioned to a trach and PEG and discharged to [facility] on 4/6. This time the patient has been having increasing abdominal pain nausea and vomiting over that has been worsening since she was discharged over at [facility]  Patient had ongoing issues since then, had SBO that resolved , since then she has an issue  with tolerating TF,  a PICC line placed on 4/25 at [facility] and was startd TPN , given she had persistent N/V, TF held and  PEG tube has been down to gravity for the last couple of days. Behavior/Cognition/Vision/Hearing:  Behavior/Cognition: Alert; Cooperative  Vision: Impaired  Hearing: Within functional limits    Impressions:  Pt. With # 6 trach with passey faith speaking valve for MBSS completion. Patient presents with  safe swallow for Dysphagia soft and bite/sized (Dysphagia III) diet with thin liquids as evidenced by no observed aspiration noted with consistencies tested. Recommend small sips and bites, only feed when alert and awake and upright at 90 degrees for all PO intake. Recommend close monitoring for overt/clinical s/s of aspiration and D/C PO intake and complete Modified Barium Swallow Study should they occur. Results and recommendations reported to RN. Patient Position: Lateral and Patient Degrees: 90      Consistencies Administered: Dysphagia Soft and Bite-Sized (Dysphagia III); Dysphagia Minced and Moist (Dysphagia II); Nectar  teaspoon;Nectar straw; Thin straw    Compensatory Swallowing Strategies Attempted: Small bites/sips;Eat/Feed slowly;Upright as possible for all oral intake  Postural Changes and/or Swallow Maneuvers Trialed: Upright 90 degrees      Recommended Diet:  Solid

## 2020-05-15 NOTE — PROGRESS NOTES
(Standard) 50 mL/hr at 20 1715    dextrose 5 % and 0.45 % NaCl 100 mL/hr at 20 1355     PRN Meds: LORazepam, morphine, acetaminophen, sodium chloride flush, potassium chloride **OR** potassium alternative oral replacement **OR** potassium chloride, magnesium sulfate, nicotine    Data:     Past Medical History:   has a past medical history of Anxiety, Arthritis, Back pain, Bronchitis, Caffeine use, Carpal tunnel syndrome, Cataracts, bilateral, Constipation, Depression, Diarrhea, GERD (gastroesophageal reflux disease), H/O gastric ulcer, Hyperlipidemia, Hypertension, Mumps, MVP (mitral valve prolapse), Recurrent UTI, Sinusitis, Tracheostomy in place [facility]), Ulcerative esophagitis, and Wellness examination. Social History:   reports that she has been smoking cigarettes. She has a 50.00 pack-year smoking history. She has never used smokeless tobacco. She reports that she does not drink alcohol or use drugs. Family History:   Family History   Problem Relation Age of Onset   Morris José Miguel Cancer Mother         uterine    Heart Attack Mother     Heart Attack Father     Other Maternal Grandfather         foot gangrene    Other Paternal Grandmother         bleeding ulcers    Other Paternal Grandfather         lung cancer       Vitals:  /64   Pulse 93   Temp 98.7 °F (37.1 °C) (Axillary)   Resp 23   Ht 5' 3\" (1.6 m)   Wt 181 lb 3.5 oz (82.2 kg)   SpO2 93%   BMI 32.10 kg/m²   Temp (24hrs), Av.9 °F (37.2 °C), Min:98.3 °F (36.8 °C), Max:99.8 °F (37.7 °C)    Recent Labs     20  1631 20  2305 05/15/20  1210   POCGLU 103 127* 126*       I/O (24Hr):     Intake/Output Summary (Last 24 hours) at 5/15/2020 1535  Last data filed at 5/15/2020 1500  Gross per 24 hour   Intake 1800 ml   Output 3000 ml   Net -1200 ml       Labs:  Hematology:  Recent Labs     20  0532 20  0550 05/15/20  0426   WBC 15.5* 10.0 8.9   RBC 3.34* 3.28* 3.53*   HGB 9.0* 9.0* 9.5*   HCT 30.1* 29.4* 32.1*   MCV 90.1 89.6 90.9   MCH 26.9 27.4 26.9   MCHC 29.9 30.6 29.6   RDW 17.7* 17.8* 17.9*    327 342   MPV 10.5 10.3 10.5   .1*  --   --    INR 1.1  --   --      Chemistry:  Recent Labs     05/12/20  1800 05/14/20  0550 05/15/20  0426    134* 138   K 5.2 4.5 4.4    97* 99   CO2 22 24 26   GLUCOSE 106* 123* 129*   BUN 23 21 30*   CREATININE 0.78 0.79 0.76   MG  --  1.9 2.1   ANIONGAP 15 13 13   LABGLOM >60 >60 >60   GFRAA >60 >60 >60   CALCIUM 10.4 10.4 10.9*   PHOS  --  4.9* 3.6     Recent Labs     05/12/20  1631 05/12/20  1800 05/13/20  2305 05/14/20  0550 05/15/20  1210   PROT  --  7.0  --  6.9  --    LABALBU  --  2.9*  --  2.9*  --    AST  --  24  --  27  --    ALT  --  34*  --  36*  --    ALKPHOS  --  189*  --  182*  --    BILITOT  --  0.22*  --  0.20*  --    BILIDIR  --  0.09  --   --   --    TRIG  --   --   --  369*  --    POCGLU 103  --  127*  --  126*     ABG:  Lab Results   Component Value Date    POCPH 7.439 04/06/2020    POCPCO2 41.9 04/06/2020    POCPO2 125.9 04/06/2020    POCHCO3 28.4 04/06/2020    NBEA NOT REPORTED 04/06/2020    PBEA 4 04/06/2020    GVI9XBQ 30 04/06/2020    CXRX6IVY 99 04/06/2020    FIO2 40.0 04/06/2020     Lab Results   Component Value Date/Time    SPECIAL R HAND 2ML 05/14/2020 11:10 AM     Lab Results   Component Value Date/Time    CULTURE NO GROWTH 1 DAY 05/14/2020 11:10 AM       Radiology:  Xr Chest Portable    Result Date: 5/13/2020  Moderate edema improved     Fl Modified Barium Swallow W Video    Result Date: 5/15/2020  Swallowing mechanism grossly within normal limits without evidence of aspiration. Please see separate speech pathology report for full discussion of findings and recommendations.        Physical Examination:        General appearance:  alert, cooperative and no distress  Mental Status:  oriented to person, place and time and normal affect  Lungs:  clear to auscultation bilaterally, normal effort  Heart:  regular rate and rhythm, no murmur  Abdomen:

## 2020-05-15 NOTE — PROGRESS NOTES
Right-sided PICC line is present with the tip in the SVC.     IMPRESSION:     Mild cardiomegaly with mild prominence of the pulmonary interstitium.        Electronically Signed By: Heidi Brandt D.O. M.SOliverio 04/06/2020 15:25:51 EDT     XR ABDOMEN KUB SUPINE:     Portable KUB.     Contrast injected through the feeding tube directly enters the mid stomach.  Bowel pattern is normal.  No incidental findings.     IMPRESSION:     Peg tube in the stomach.        Electronically Signed By: Dr. Ngozi Whiting M.D. 04/06/2020 15:26:25 EDT        CT Abdomen and Pelvis w/o contrast: 4-13-20     Axial acquisition through the abdomen and pelvis without contrast.     Bilateral basilar pulmonary infiltrates are present left greater than right.     A few tiny gallstones are seen layering in the gallbladder, which is mildly distended.  There is no gallbladder wall thickening or pericholecystic fluid, edema.     No liver lesion or bile duct dilatation.     Spleen pancreas left kidney unremarkable.  Absent right kidney.     No intestinal obstruction.  No free air.  No ascites.  No abscess identified.     Urinary bladder is empty, Saldana catheter in place.  Uterus and adnexa not visualized.  Gastrostomy tube tip is in the body of the stomach.     IMPRESSION:     1. No intra-abdominal abscess.  No free air no ascites.  No obstruction. 2. Bilateral basilar pulmonary infiltrates. 3. A few tiny gallstones seen within the gallbladder.  Mild gallbladder distention.        Electronically Signed By: Dr. Clemencia Ching M.D. 04/13/2020 12:10:34 EDT     XR ABDOMEN KUB SUPINE:4-13-20     Clinical statement: constipation.     Comparison: 4/6/2020.     Findings: A percutaneous gastrostomy tube appears in satisfactory position overlying the stomach.  A nonobstructive bowel gas pattern is noted. There is normal amount of stool appreciated within the colon. Glorietta Misael is no free air. There are no abnormal   masses or calcifications.  The osseous structures and soft tissues are unremarkable.     IMPRESSION:     No acute finding. PEG tube is in place.        Electronically Signed By: Dr. Karissa Lee M.D. 04/13/2020 9:45:23 EDT    Medical Decision [id number]:       Medical Decision Making-Other:     Note:  · Labs, medications, radiologic studies were reviewed with personal review of films  · Large amounts of data were reviewed  · Discussed with nursing Staff, Discharge planner  · Infection Control and Prevention measures reviewed  · All prior entries were reviewed  · Administer medications as ordered  · Prognosis: Guarded  · Discharge planning reviewed  · Follow up as outpatient. Thank you for allowing us to participate in the care of this patient. Please call with questions.     Jr Rubi MD  Pager: [phone] - Office: [phone]

## 2020-05-16 ENCOUNTER — APPOINTMENT (OUTPATIENT)
Dept: ULTRASOUND IMAGING | Age: 75
DRG: 326 | End: 2020-05-16
Attending: FAMILY MEDICINE
Payer: MEDICARE

## 2020-05-16 PROBLEM — K31.1 GASTRIC OUTLET OBSTRUCTION: Status: ACTIVE | Noted: 2020-05-12

## 2020-05-16 LAB
ABSOLUTE EOS #: 0.15 K/UL (ref 0–0.44)
ABSOLUTE IMMATURE GRANULOCYTE: 0.1 K/UL (ref 0–0.3)
ABSOLUTE LYMPH #: 1.56 K/UL (ref 1.1–3.7)
ABSOLUTE MONO #: 0.83 K/UL (ref 0.1–1.2)
ANION GAP SERPL CALCULATED.3IONS-SCNC: 13 MMOL/L (ref 9–17)
BASOPHILS # BLD: 0 % (ref 0–2)
BASOPHILS ABSOLUTE: 0.03 K/UL (ref 0–0.2)
BUN BLDV-MCNC: 29 MG/DL (ref 8–23)
BUN/CREAT BLD: ABNORMAL (ref 9–20)
CALCIUM SERPL-MCNC: 10.7 MG/DL (ref 8.6–10.4)
CHLORIDE BLD-SCNC: 100 MMOL/L (ref 98–107)
CO2: 24 MMOL/L (ref 20–31)
CREAT SERPL-MCNC: 0.7 MG/DL (ref 0.5–0.9)
DIFFERENTIAL TYPE: ABNORMAL
EOSINOPHILS RELATIVE PERCENT: 2 % (ref 1–4)
GFR AFRICAN AMERICAN: >60 ML/MIN
GFR NON-AFRICAN AMERICAN: >60 ML/MIN
GFR SERPL CREATININE-BSD FRML MDRD: ABNORMAL ML/MIN/{1.73_M2}
GFR SERPL CREATININE-BSD FRML MDRD: ABNORMAL ML/MIN/{1.73_M2}
GLUCOSE BLD-MCNC: 105 MG/DL (ref 65–105)
GLUCOSE BLD-MCNC: 110 MG/DL (ref 65–105)
GLUCOSE BLD-MCNC: 114 MG/DL (ref 65–105)
GLUCOSE BLD-MCNC: 115 MG/DL (ref 70–99)
GLUCOSE BLD-MCNC: 124 MG/DL (ref 65–105)
HAV IGM SER IA-ACNC: NONREACTIVE
HCT VFR BLD CALC: 32.3 % (ref 36.3–47.1)
HEMOGLOBIN: 9.3 G/DL (ref 11.9–15.1)
HEPATITIS B CORE IGM ANTIBODY: NONREACTIVE
HEPATITIS B CORE TOTAL ANTIBODY: NONREACTIVE
HEPATITIS B SURFACE ANTIGEN: NONREACTIVE
HEPATITIS C ANTIBODY: NONREACTIVE
IMMATURE GRANULOCYTES: 1 %
LYMPHOCYTES # BLD: 17 % (ref 24–43)
MAGNESIUM: 2 MG/DL (ref 1.6–2.6)
MCH RBC QN AUTO: 27.2 PG (ref 25.2–33.5)
MCHC RBC AUTO-ENTMCNC: 28.8 G/DL (ref 28.4–34.8)
MCV RBC AUTO: 94.4 FL (ref 82.6–102.9)
MONOCYTES # BLD: 9 % (ref 3–12)
NRBC AUTOMATED: 0 PER 100 WBC
PDW BLD-RTO: 18.4 % (ref 11.8–14.4)
PHOSPHORUS: 3.4 MG/DL (ref 2.6–4.5)
PLATELET # BLD: 359 K/UL (ref 138–453)
PLATELET ESTIMATE: ABNORMAL
PMV BLD AUTO: 11.2 FL (ref 8.1–13.5)
POTASSIUM SERPL-SCNC: 4.2 MMOL/L (ref 3.7–5.3)
RBC # BLD: 3.42 M/UL (ref 3.95–5.11)
RBC # BLD: ABNORMAL 10*6/UL
SEG NEUTROPHILS: 71 % (ref 36–65)
SEGMENTED NEUTROPHILS ABSOLUTE COUNT: 6.51 K/UL (ref 1.5–8.1)
SODIUM BLD-SCNC: 137 MMOL/L (ref 135–144)
WBC # BLD: 9.2 K/UL (ref 3.5–11.3)
WBC # BLD: ABNORMAL 10*3/UL

## 2020-05-16 PROCEDURE — 84100 ASSAY OF PHOSPHORUS: CPT

## 2020-05-16 PROCEDURE — 2500000003 HC RX 250 WO HCPCS: Performed by: NURSE PRACTITIONER

## 2020-05-16 PROCEDURE — 86704 HEP B CORE ANTIBODY TOTAL: CPT

## 2020-05-16 PROCEDURE — 36415 COLL VENOUS BLD VENIPUNCTURE: CPT

## 2020-05-16 PROCEDURE — 6370000000 HC RX 637 (ALT 250 FOR IP): Performed by: FAMILY MEDICINE

## 2020-05-16 PROCEDURE — 83516 IMMUNOASSAY NONANTIBODY: CPT

## 2020-05-16 PROCEDURE — 2060000000 HC ICU INTERMEDIATE R&B

## 2020-05-16 PROCEDURE — 80074 ACUTE HEPATITIS PANEL: CPT

## 2020-05-16 PROCEDURE — 6370000000 HC RX 637 (ALT 250 FOR IP): Performed by: NURSE PRACTITIONER

## 2020-05-16 PROCEDURE — 6360000002 HC RX W HCPCS: Performed by: NURSE PRACTITIONER

## 2020-05-16 PROCEDURE — 2500000003 HC RX 250 WO HCPCS: Performed by: FAMILY MEDICINE

## 2020-05-16 PROCEDURE — 6370000000 HC RX 637 (ALT 250 FOR IP): Performed by: STUDENT IN AN ORGANIZED HEALTH CARE EDUCATION/TRAINING PROGRAM

## 2020-05-16 PROCEDURE — 80048 BASIC METABOLIC PNL TOTAL CA: CPT

## 2020-05-16 PROCEDURE — 86038 ANTINUCLEAR ANTIBODIES: CPT

## 2020-05-16 PROCEDURE — 97110 THERAPEUTIC EXERCISES: CPT

## 2020-05-16 PROCEDURE — C9113 INJ PANTOPRAZOLE SODIUM, VIA: HCPCS | Performed by: NURSE PRACTITIONER

## 2020-05-16 PROCEDURE — 99232 SBSQ HOSP IP/OBS MODERATE 35: CPT | Performed by: INTERNAL MEDICINE

## 2020-05-16 PROCEDURE — 76705 ECHO EXAM OF ABDOMEN: CPT

## 2020-05-16 PROCEDURE — 97530 THERAPEUTIC ACTIVITIES: CPT

## 2020-05-16 PROCEDURE — 2580000003 HC RX 258: Performed by: NURSE PRACTITIONER

## 2020-05-16 PROCEDURE — 83735 ASSAY OF MAGNESIUM: CPT

## 2020-05-16 PROCEDURE — 99222 1ST HOSP IP/OBS MODERATE 55: CPT | Performed by: INTERNAL MEDICINE

## 2020-05-16 PROCEDURE — 85025 COMPLETE CBC W/AUTO DIFF WBC: CPT

## 2020-05-16 PROCEDURE — 99232 SBSQ HOSP IP/OBS MODERATE 35: CPT | Performed by: FAMILY MEDICINE

## 2020-05-16 RX ADMIN — ENOXAPARIN SODIUM 40 MG: 40 INJECTION SUBCUTANEOUS at 07:43

## 2020-05-16 RX ADMIN — QUETIAPINE FUMARATE 100 MG: 100 TABLET ORAL at 20:39

## 2020-05-16 RX ADMIN — ESCITALOPRAM OXALATE 20 MG: 10 TABLET ORAL at 07:45

## 2020-05-16 RX ADMIN — AMITRIPTYLINE HYDROCHLORIDE 25 MG: 25 TABLET, FILM COATED ORAL at 20:39

## 2020-05-16 RX ADMIN — AMITRIPTYLINE HYDROCHLORIDE 25 MG: 25 TABLET, FILM COATED ORAL at 07:43

## 2020-05-16 RX ADMIN — METOPROLOL TARTRATE 5 MG: 1 INJECTION, SOLUTION INTRAVENOUS at 04:15

## 2020-05-16 RX ADMIN — METOCLOPRAMIDE 10 MG: 5 INJECTION, SOLUTION INTRAMUSCULAR; INTRAVENOUS at 10:00

## 2020-05-16 RX ADMIN — MORPHINE SULFATE 2 MG: 2 INJECTION, SOLUTION INTRAMUSCULAR; INTRAVENOUS at 04:11

## 2020-05-16 RX ADMIN — GABAPENTIN 300 MG: 300 CAPSULE ORAL at 07:45

## 2020-05-16 RX ADMIN — BUSPIRONE HYDROCHLORIDE 20 MG: 10 TABLET ORAL at 07:44

## 2020-05-16 RX ADMIN — TRAZODONE HYDROCHLORIDE 100 MG: 100 TABLET ORAL at 20:39

## 2020-05-16 RX ADMIN — METOCLOPRAMIDE 10 MG: 5 INJECTION, SOLUTION INTRAMUSCULAR; INTRAVENOUS at 21:47

## 2020-05-16 RX ADMIN — BUSPIRONE HYDROCHLORIDE 20 MG: 10 TABLET ORAL at 14:35

## 2020-05-16 RX ADMIN — METOPROLOL TARTRATE 5 MG: 1 INJECTION, SOLUTION INTRAVENOUS at 20:41

## 2020-05-16 RX ADMIN — GABAPENTIN 300 MG: 300 CAPSULE ORAL at 20:39

## 2020-05-16 RX ADMIN — LORAZEPAM 1 MG: 2 INJECTION INTRAMUSCULAR at 01:00

## 2020-05-16 RX ADMIN — MORPHINE SULFATE 2 MG: 2 INJECTION, SOLUTION INTRAMUSCULAR; INTRAVENOUS at 12:13

## 2020-05-16 RX ADMIN — METOPROLOL TARTRATE 5 MG: 1 INJECTION, SOLUTION INTRAVENOUS at 07:58

## 2020-05-16 RX ADMIN — LORAZEPAM 1 MG: 2 INJECTION INTRAMUSCULAR at 13:11

## 2020-05-16 RX ADMIN — TAMSULOSIN HYDROCHLORIDE 0.4 MG: 0.4 CAPSULE ORAL at 07:43

## 2020-05-16 RX ADMIN — AMITRIPTYLINE HYDROCHLORIDE 25 MG: 25 TABLET, FILM COATED ORAL at 14:35

## 2020-05-16 RX ADMIN — LORAZEPAM 1 MG: 2 INJECTION INTRAMUSCULAR at 21:47

## 2020-05-16 RX ADMIN — BUSPIRONE HYDROCHLORIDE 20 MG: 10 TABLET ORAL at 20:39

## 2020-05-16 RX ADMIN — FUROSEMIDE 20 MG: 20 TABLET ORAL at 07:46

## 2020-05-16 RX ADMIN — PANTOPRAZOLE SODIUM 40 MG: 40 INJECTION, POWDER, LYOPHILIZED, FOR SOLUTION INTRAVENOUS at 07:45

## 2020-05-16 RX ADMIN — METOPROLOL TARTRATE 5 MG: 1 INJECTION, SOLUTION INTRAVENOUS at 12:13

## 2020-05-16 RX ADMIN — LORAZEPAM 1 MG: 2 INJECTION INTRAMUSCULAR at 17:39

## 2020-05-16 RX ADMIN — GABAPENTIN 300 MG: 300 CAPSULE ORAL at 14:35

## 2020-05-16 RX ADMIN — MORPHINE SULFATE 2 MG: 2 INJECTION, SOLUTION INTRAMUSCULAR; INTRAVENOUS at 07:45

## 2020-05-16 RX ADMIN — Medication 10 ML: at 07:47

## 2020-05-16 RX ADMIN — LORAZEPAM 1 MG: 2 INJECTION INTRAMUSCULAR at 00:52

## 2020-05-16 RX ADMIN — SODIUM CHLORIDE, PRESERVATIVE FREE 10 ML: 5 INJECTION INTRAVENOUS at 20:41

## 2020-05-16 RX ADMIN — POTASSIUM CHLORIDE: 2 INJECTION, SOLUTION, CONCENTRATE INTRAVENOUS at 18:29

## 2020-05-16 RX ADMIN — BISACODYL 10 MG: 10 SUPPOSITORY RECTAL at 07:46

## 2020-05-16 RX ADMIN — METOCLOPRAMIDE 10 MG: 5 INJECTION, SOLUTION INTRAMUSCULAR; INTRAVENOUS at 04:12

## 2020-05-16 RX ADMIN — CLONAZEPAM 0.5 MG: 0.5 TABLET ORAL at 07:43

## 2020-05-16 RX ADMIN — METOCLOPRAMIDE 10 MG: 5 INJECTION, SOLUTION INTRAMUSCULAR; INTRAVENOUS at 15:58

## 2020-05-16 RX ADMIN — CLONAZEPAM 0.5 MG: 0.5 TABLET ORAL at 20:39

## 2020-05-16 ASSESSMENT — PAIN DESCRIPTION - FREQUENCY: FREQUENCY: CONTINUOUS

## 2020-05-16 ASSESSMENT — PAIN SCALES - GENERAL
PAINLEVEL_OUTOF10: 0
PAINLEVEL_OUTOF10: 0
PAINLEVEL_OUTOF10: 8
PAINLEVEL_OUTOF10: 8
PAINLEVEL_OUTOF10: 0
PAINLEVEL_OUTOF10: 0
PAINLEVEL_OUTOF10: 8

## 2020-05-16 ASSESSMENT — PAIN DESCRIPTION - ORIENTATION: ORIENTATION: LOWER

## 2020-05-16 ASSESSMENT — PAIN DESCRIPTION - ONSET: ONSET: ON-GOING

## 2020-05-16 ASSESSMENT — PAIN DESCRIPTION - PAIN TYPE: TYPE: CHRONIC PAIN

## 2020-05-16 ASSESSMENT — PAIN DESCRIPTION - DESCRIPTORS: DESCRIPTORS: ACHING

## 2020-05-16 ASSESSMENT — PAIN DESCRIPTION - LOCATION: LOCATION: ABDOMEN;BACK

## 2020-05-16 ASSESSMENT — PAIN - FUNCTIONAL ASSESSMENT: PAIN_FUNCTIONAL_ASSESSMENT: PREVENTS OR INTERFERES SOME ACTIVE ACTIVITIES AND ADLS

## 2020-05-16 ASSESSMENT — PAIN DESCRIPTION - PROGRESSION: CLINICAL_PROGRESSION: NOT CHANGED

## 2020-05-16 NOTE — PROGRESS NOTES
Physical Therapy  Facility/Department: [address] STEPDOWN  Daily Treatment Note  NAME: Linden Gomez  : 1945  MRN: [id number]    Date of Service: 2020    Discharge Recommendations:  Patient would benefit from continued therapy after discharge        Assessment   Body structures, Functions, Activity limitations: Decreased functional mobility ; Decreased endurance;Decreased strength;Decreased balance  Assessment: pt requiring modA for bed mobility, declined standing, Tolerated sitting EOB x 15 mins with encouragement; Pt not safe to return home w/o assistance  Prognosis: Good  Decision Making: Medium Complexity  PT Education: Plan of Care;General Safety; Functional Mobility Training;Home Exercise Program  Patient Education: Importance of mobility and ROM  REQUIRES PT FOLLOW UP: Yes  Activity Tolerance  Activity Tolerance: Patient limited by fatigue;Patient limited by pain; Patient limited by endurance     Patient Diagnosis(es): There were no encounter diagnoses. has a past medical history of Anxiety, Arthritis, Back pain, Bronchitis, Caffeine use, Carpal tunnel syndrome, Cataracts, bilateral, Constipation, Depression, Diarrhea, GERD (gastroesophageal reflux disease), H/O gastric ulcer, Hyperlipidemia, Hypertension, Mumps, MVP (mitral valve prolapse), Recurrent UTI, Sinusitis, Tracheostomy in place [facility]), Ulcerative esophagitis, and Wellness examination. has a past surgical history that includes Hysterectomy; total nephrectomy; Tonsillectomy; Appendectomy; Cystoscopy; Ovary removal; Tubal ligation; rhinoplasty; Carpal tunnel release; Hand surgery; Total hip arthroplasty; eye surgery; Colonoscopy; joint replacement (Right); hiatal hernia repair (2020); Gastric fundoplication (N/A, 952);  cath power picc triple (3/4/2020); EGD (3/17/2020); Upper gastrointestinal endoscopy (N/A, 3/17/2020); tracheostomy (N/A, 3/27/2020);  Gastrostomy tube placement (N/A, 3/27/2020); and tracheostomy (N/A, Training  Safety Devices  Type of devices: Left in bed, Nurse notified, Call light within reach, Patient at risk for falls, Bed alarm in place, All fall risk precautions in place     Therapy Time   Individual Concurrent Group Co-treatment   Time In 1455         Time Out 1528         Minutes 33          Time coded minutes 180 Tony Taylor, PTA

## 2020-05-16 NOTE — PROGRESS NOTES
Surgery Progress Note            PATIENT NAME: Richard Waldrop     TODAY'S DATE: 5/16/2020, 6:47 AM    SUBJECTIVE:    Pt resting comfortable in bed. No new events overnight. UGI results noted. OBJECTIVE:   VITALS:  BP (!) 120/59   Pulse 88   Temp 98.5 °F (36.9 °C) (Oral)   Resp 25   Ht 5' 3\" (1.6 m)   Wt 188 lb 7.9 oz (85.5 kg)   SpO2 95%   BMI 33.39 kg/m²      INTAKE/OUTPUT:      Intake/Output Summary (Last 24 hours) at 5/16/2020 0647  Last data filed at 5/16/2020 0519  Gross per 24 hour   Intake 1211 ml   Output 2150 ml   Net -939 ml       PHYSICAL EXAM  Constitutional:  Vital signs are normal.   No apparent distress     Data:  CBC:   Lab Results   Component Value Date    WBC 9.2 05/16/2020    RBC 3.42 05/16/2020    HGB 9.3 05/16/2020    HCT 32.3 05/16/2020    MCV 94.4 05/16/2020    MCH 27.2 05/16/2020    MCHC 28.8 05/16/2020    RDW 18.4 05/16/2020     05/16/2020    MPV 11.2 05/16/2020     BMP:    Lab Results   Component Value Date     05/15/2020    K 4.4 05/15/2020    CL 99 05/15/2020    CO2 26 05/15/2020    BUN 30 05/15/2020    LABALBU 2.9 05/14/2020    CREATININE 0.76 05/15/2020    CALCIUM 10.9 05/15/2020    GFRAA >60 05/15/2020    LABGLOM >60 05/15/2020    GLUCOSE 129 05/15/2020       Radiology Review:  UGI:   Impression   Findings are suspicious of at least partial or intermittent gastric outlet   obstruction/paralysis of the pylorus.       I discussed these findings with Dr. Stephanie Smith at 3:29 p.m. on 5/15/2020             ASSESSMENT   1. Gastric outlet obstruction     Plan  1.  Will consult Dr Leonora Mcdaniel for botox injection of pylorus    Electronically signed by Mejia Gonsales MD  on 5/16/2020 at 6:47 AM

## 2020-05-16 NOTE — PLAN OF CARE
Problem: Pain:  Description: Pain management should include both nonpharmacologic and pharmacologic interventions.   Goal: Pain level will decrease  Description: Pain level will decrease  5/16/2020 1027 by Kerri Christiansen RN  Outcome: Ongoing  5/16/2020 0459 by Linda Jha RN  Outcome: Ongoing  Goal: Control of acute pain  Description: Control of acute pain  5/16/2020 1027 by Kerri Christiansen RN  Outcome: Ongoing  5/16/2020 0459 by Linda Jha RN  Outcome: Ongoing  Goal: Control of chronic pain  Description: Control of chronic pain  5/16/2020 1027 by Kerri Christiansen RN  Outcome: Ongoing  5/16/2020 0459 by Linda Jha RN  Outcome: Ongoing

## 2020-05-16 NOTE — PROGRESS NOTES
[facility]ur[facility]    Progress Note    5/16/2020    3:18 PM    Name:   Irma Mendes  MRN:     [id number]     Acct:      [de-identified]   Room:   [address] [place] [address] Day:  4  Admit Date:  5/12/2020  3:19 PM    PCP:   Troy Bahena MD  Code Status:  Full Code    Subjective:     C/C: abdominal pain, N/V    Interval History Status: improved. Patient seen and examined at bedside, no acute events overnight. Her upper GI series yesterday confirmed gastric outlet obstruction, GI was consulted by general surgery. Otherwise patient asymptomatic and afebrile overnight. Continue to have good bowel movements  Patient denies any chest pain, shortness of breath, chills, fevers, nausea or vomiting. Patient vitals, labs and all providers notes were reviewed,from overnight shift and morning updates were noted and discussed with the nurse    Brief History:     Per records:     Prakash Martines a 76 y.o. Non-/non  female who presents with No chief complaint on file.   and is admitted to the hospital for the management of SBO  Patient transferred to our facility from [facility] with possibility of bowel obstruction, 2 months ago she had prolonged extensive hospital stay, at the end of February had a Nissen fundoplication with possible aspiration issues and was admitted and stayed in the hospital for 37 days during that time she was intubated twice had hematemesis and had EGD that showed gastric ulcer and was seen by a specialist and after long course she was successfully transitioned to a trach and PEG and discharged to [facility] on 4/6.   This time the patient has been having increasing abdominal pain nausea and vomiting over that has been worsening since she was discharged over at [facility]  Patient had ongoing issues since then, had SBO that resolved , since then she has an issue  with tolerating TF,  a PICC line placed on 4/25 at [facility] and was startd TPN , NaCl 20 mL/hr at 05/15/20 1626     PRN Meds: LORazepam, morphine, acetaminophen, sodium chloride flush, potassium chloride **OR** potassium alternative oral replacement **OR** potassium chloride, magnesium sulfate, nicotine    Data:     Past Medical History:   has a past medical history of Anxiety, Arthritis, Back pain, Bronchitis, Caffeine use, Carpal tunnel syndrome, Cataracts, bilateral, Constipation, Depression, Diarrhea, GERD (gastroesophageal reflux disease), H/O gastric ulcer, Hyperlipidemia, Hypertension, Mumps, MVP (mitral valve prolapse), Recurrent UTI, Sinusitis, Tracheostomy in place [facility]), Ulcerative esophagitis, and Wellness examination. Social History:   reports that she has been smoking cigarettes. She has a 50.00 pack-year smoking history. She has never used smokeless tobacco. She reports that she does not drink alcohol or use drugs. Family History:   Family History   Problem Relation Age of Onset   [facility] Cancer Mother         uterine    Heart Attack Mother     Heart Attack Father     Other Maternal Grandfather         foot gangrene    Other Paternal Grandmother         bleeding ulcers    Other Paternal Grandfather         lung cancer       Vitals:  /61   Pulse 81   Temp 98.1 °F (36.7 °C) (Oral)   Resp 21   Ht 5' 3\" (1.6 m)   Wt 188 lb 7.9 oz (85.5 kg)   SpO2 94%   BMI 33.39 kg/m²   Temp (24hrs), Av.1 °F (36.7 °C), Min:97.8 °F (36.6 °C), Max:98.5 °F (36.9 °C)    Recent Labs     05/15/20  1949 05/15/20  2359 20  0609 20  1102   POCGLU 122* 124* 110* 105       I/O (24Hr):     Intake/Output Summary (Last 24 hours) at 2020 1518  Last data filed at 2020 1159  Gross per 24 hour   Intake 1211 ml   Output 2475 ml   Net -1264 ml       Labs:  Hematology:  Recent Labs     20  0550 05/15/20  0426 20  0547   WBC 10.0 8.9 9.2   RBC 3.28* 3.53* 3.42*   HGB 9.0* 9.5* 9.3*   HCT 29.4* 32.1* 32.3*   MCV 89.6 90.9 94.4   MCH 27.4 26.9 27.2   MCHC 30.6 29.6 28.8 hypoglycemia protocol    DVT prophylaxis    Discussed with the patient and the nurse        Tahmina Haq MD  5/16/2020  3:18 PM

## 2020-05-16 NOTE — PROGRESS NOTES
showed bibasilar atelectasis/consolidation and patchy area of infiltrate in the right upper lobe. Patient was discharged on home oxygen on 2-28.     She presented back to [facility] ER on 2-29 with worsening shortness of breath, dyspnea as well as an episode of dark red emesis. Her SaO2 was 60 % in the ED. There was concern of aspiration. CTA chest done was negative for PE, showed bilateral pleural effusions and severe esophageal dilatation, surgery was consulted. Patient was transferred to [facility] for further management.     Patient was started on noninvasive ventilation and then intubated and admitted to the ICU. She improved, was extubated on 3-7 to high flow O2. Pt was transferred out of the ICU on 3-13.     On 3-14 pts hgb dropped to 6.3 and she developed coffee-ground then bright red emesis, and was again transferred back to medical ICU.     General surgery performed endoscopy on 3/17 which showed an ulcer at the GE junction and mild gastritis with no active bleeding. After the procedure she developed acute respiratory distress and a fever of 39.8. She was emergently intubated and started on Meropenem and Vancomycin.      ID is consulted for antibiotic management. Patient was continued on meropenem. Vancomycin D/C     S/P trach and PEG placement 3/27     Flat plate of abdomen 3-25-01 with nonobstructive gas pattern with retained enteric contrast in the colon  On TPN. Patient could not tolerate tube feeds. Has superficial venous clot Rt Mid forearm to antecubital      Tracheal exchange in OR done on 4-2-20. Patient transferred to [facility] on 4-6-20. Transferred back to [facility] on 5-12-20 because of persistent vomiting, abdominal pain. Concern for possible SBO. Pt NPO on TPN. 5-15 Passed swallow study to Dys III w/thickened liquids  Per surgery, pt for esophagram to evaluate the hiatal hernia repair.  If the esophagram appears normal--> we will plan for a PEGogram and evaluate whether the stomach is emptying appropriately. CURRENT EVALUATION: 5/16/2020     Afebrile  VS stable RR 20-23  Remains anxious. Receiving medications to help with anxiety.     Tracheostomy capped. Tracheal secretions scant, whitish.      On TPN  X rays suspicious of at least partial or intermittent gastric outlet obstruction/paralysis of the pylorus. Botox injection planned. PEG to gravity draining     Pertinent Labs and X rays reviewed: 5/16/2020       BUN: 24-->19-->21->30  Cr. :0.69-->1.0-->0.79->0.76     WBC: 7.7-->13.5-->10.0->8.9  Hb:8.8-->9.0->9.5  Plat: 179-->268-->327->342     Cultures:  Urine:  · 4-11-20: No growth  Blood:  · 4-11-20: No growth  · 5-3 x 2 no growth  · 5-14 x 2 no growth to date  Sputum :  ·    Wound:  ·    5-14 COVID negative      CXR 4-11-20: cardiomegaly, lungs clear  KUB 4-13-20; Normal  CT Abdomen 4-13-20: Few gallstones with mild GB distension. Otherwise abdomen normal.  Bilateral pulmonary basilar infiltrates. Abd XR 4-27-20: Dilated loops of bowel. Ileus  CXR 4-27-20: Vascular congestion, small Lt pleural effusion  CXR 5-6-20:  Cardiomegaly with Rt side infiltrate  CXR 5-8-20: Cardiomegaly with minimal RUL infiltrate   CXR 5-13-20: Moderate edema improved    Discussed with patient, RN. I have personally reviewed the past medical history, past surgical history, medications, social history, and family history, and I have updated the database accordingly.   Past Medical History:     Past Medical History:   Diagnosis Date    Anxiety     Arthritis     Back pain     Bronchitis     Caffeine use     8 coffee / day    Carpal tunnel syndrome     Cataracts, bilateral     Constipation     Depression     Diarrhea     GERD (gastroesophageal reflux disease)     H/O gastric ulcer     Hyperlipidemia     Hypertension     Mumps     MVP (mitral valve prolapse)     Dr. Tal Melgar in May 2019    Recurrent UTI     Dr. Rikki Villagomez    Sinusitis     Tracheostomy in place [facility])     Ulcerative esophagitis with nursing Staff, Discharge planner  · Infection Control and Prevention measures reviewed  · All prior entries were reviewed  · Administer medications as ordered  · Prognosis: Guarded  · Discharge planning reviewed  · Follow up as outpatient. Thank you for allowing us to participate in the care of this patient. Please call with questions.     Rogelio Kent MD  Pager: [phone] - Office: [phone]

## 2020-05-16 NOTE — PROGRESS NOTES
Nutrition Assessment (Parenteral Nutrition)    Type and Reason for Visit: Reassess    Nutrition Recommendations: Continue current diet, Start CL ONS, Continue Parenteral Nutrition- increase dextrose until able to better meet needs with oral diet. Will also decrease Ca in next bag. Await next triglyceride level -add lipids back if level is ok. Nutrition Assessment: Noted pt was started on a CL diet this afternoon. Pt is currently eating jello. TPN continues at this time. Meds/Labs reviewed. Malnutrition Assessment:  · Malnutrition Status: At risk for malnutrition  · Context: Acute illness or injury  · Findings of the 6 clinical characteristics of malnutrition (Minimum of 2 out of 6 clinical characteristics is required to make the diagnosis of moderate or severe Protein Calorie Malnutrition based on AND/ASPEN Guidelines):  1. Energy Intake-Greater than 75% of estimated energy requirement, Greater than or equal to 1 month(w/ TPN)    2. Weight Loss-No significant weight loss,    3. Fat Loss-No significant subcutaneous fat loss,    4. Muscle Loss-No significant muscle mass loss,    5. Fluid Accumulation-No significant fluid accumulation,    6.   Strength-Not measured    Nutrition Risk Level: High    Nutrient Needs:  · Estimated Daily Total Kcal:7031-1764 kcals/d   · Estimated Daily Protein (g): 60-75 g/d     Nutrition Diagnosis:   · Problem: Inadequate oral intake  · Etiology: related to Alteration in GI function     Signs and symptoms:  as evidenced by Nutrition support - PN, NPO/CLD     Objective Information:  · Current Nutrition Therapies:  · Oral Diet Orders: Clear Liquid   · Oral Diet intake: Clear liquid diet just started -eating jello at present  · Oral Nutrition Supplement (ONS) Orders: None  · Parenteral Nutrition Orders:  · Type and Formula: 2-in-1 Custom(150 g Dex, 70 g AA)   · Lipids: None  · Rate/Volume: 50 ml/hr; 1200 mL/day   · Duration: Continuous  · Current PN Order Provides: 790 kcal and

## 2020-05-17 LAB
ABSOLUTE EOS #: 0.15 K/UL (ref 0–0.44)
ABSOLUTE IMMATURE GRANULOCYTE: 0.14 K/UL (ref 0–0.3)
ABSOLUTE LYMPH #: 2.28 K/UL (ref 1.1–3.7)
ABSOLUTE MONO #: 1.19 K/UL (ref 0.1–1.2)
ANION GAP SERPL CALCULATED.3IONS-SCNC: 16 MMOL/L (ref 9–17)
BASOPHILS # BLD: 1 % (ref 0–2)
BASOPHILS ABSOLUTE: 0.05 K/UL (ref 0–0.2)
BUN BLDV-MCNC: 30 MG/DL (ref 8–23)
BUN/CREAT BLD: ABNORMAL (ref 9–20)
CALCIUM SERPL-MCNC: 10.5 MG/DL (ref 8.6–10.4)
CHLORIDE BLD-SCNC: 102 MMOL/L (ref 98–107)
CO2: 24 MMOL/L (ref 20–31)
CREAT SERPL-MCNC: 0.74 MG/DL (ref 0.5–0.9)
DIFFERENTIAL TYPE: ABNORMAL
EOSINOPHILS RELATIVE PERCENT: 1 % (ref 1–4)
GFR AFRICAN AMERICAN: >60 ML/MIN
GFR NON-AFRICAN AMERICAN: >60 ML/MIN
GFR SERPL CREATININE-BSD FRML MDRD: ABNORMAL ML/MIN/{1.73_M2}
GFR SERPL CREATININE-BSD FRML MDRD: ABNORMAL ML/MIN/{1.73_M2}
GLUCOSE BLD-MCNC: 105 MG/DL (ref 70–99)
GLUCOSE BLD-MCNC: 111 MG/DL (ref 65–105)
HCT VFR BLD CALC: 32.5 % (ref 36.3–47.1)
HEMOGLOBIN: 9.9 G/DL (ref 11.9–15.1)
IMMATURE GRANULOCYTES: 1 %
LYMPHOCYTES # BLD: 21 % (ref 24–43)
MAGNESIUM: 2.1 MG/DL (ref 1.6–2.6)
MCH RBC QN AUTO: 27.7 PG (ref 25.2–33.5)
MCHC RBC AUTO-ENTMCNC: 30.5 G/DL (ref 28.4–34.8)
MCV RBC AUTO: 91 FL (ref 82.6–102.9)
MONOCYTES # BLD: 11 % (ref 3–12)
NRBC AUTOMATED: 0 PER 100 WBC
PDW BLD-RTO: 18.1 % (ref 11.8–14.4)
PHOSPHORUS: 3.1 MG/DL (ref 2.6–4.5)
PLATELET # BLD: 351 K/UL (ref 138–453)
PLATELET ESTIMATE: ABNORMAL
PMV BLD AUTO: 10.7 FL (ref 8.1–13.5)
POTASSIUM SERPL-SCNC: 4.5 MMOL/L (ref 3.7–5.3)
RBC # BLD: 3.57 M/UL (ref 3.95–5.11)
RBC # BLD: ABNORMAL 10*6/UL
SEG NEUTROPHILS: 65 % (ref 36–65)
SEGMENTED NEUTROPHILS ABSOLUTE COUNT: 7.26 K/UL (ref 1.5–8.1)
SODIUM BLD-SCNC: 142 MMOL/L (ref 135–144)
TRIGL SERPL-MCNC: 310 MG/DL
WBC # BLD: 11.1 K/UL (ref 3.5–11.3)
WBC # BLD: ABNORMAL 10*3/UL

## 2020-05-17 PROCEDURE — 6360000002 HC RX W HCPCS: Performed by: NURSE PRACTITIONER

## 2020-05-17 PROCEDURE — 99232 SBSQ HOSP IP/OBS MODERATE 35: CPT | Performed by: FAMILY MEDICINE

## 2020-05-17 PROCEDURE — 36415 COLL VENOUS BLD VENIPUNCTURE: CPT

## 2020-05-17 PROCEDURE — 2500000003 HC RX 250 WO HCPCS: Performed by: NURSE PRACTITIONER

## 2020-05-17 PROCEDURE — 6370000000 HC RX 637 (ALT 250 FOR IP): Performed by: NURSE PRACTITIONER

## 2020-05-17 PROCEDURE — 6370000000 HC RX 637 (ALT 250 FOR IP): Performed by: FAMILY MEDICINE

## 2020-05-17 PROCEDURE — 84100 ASSAY OF PHOSPHORUS: CPT

## 2020-05-17 PROCEDURE — 2580000003 HC RX 258: Performed by: NURSE PRACTITIONER

## 2020-05-17 PROCEDURE — APPNB45 APP NON BILLABLE 31-45 MINUTES: Performed by: INTERNAL MEDICINE

## 2020-05-17 PROCEDURE — C9113 INJ PANTOPRAZOLE SODIUM, VIA: HCPCS | Performed by: NURSE PRACTITIONER

## 2020-05-17 PROCEDURE — 85025 COMPLETE CBC W/AUTO DIFF WBC: CPT

## 2020-05-17 PROCEDURE — 97535 SELF CARE MNGMENT TRAINING: CPT

## 2020-05-17 PROCEDURE — 84478 ASSAY OF TRIGLYCERIDES: CPT

## 2020-05-17 PROCEDURE — 80048 BASIC METABOLIC PNL TOTAL CA: CPT

## 2020-05-17 PROCEDURE — 82947 ASSAY GLUCOSE BLOOD QUANT: CPT

## 2020-05-17 PROCEDURE — 99232 SBSQ HOSP IP/OBS MODERATE 35: CPT | Performed by: INTERNAL MEDICINE

## 2020-05-17 PROCEDURE — 83735 ASSAY OF MAGNESIUM: CPT

## 2020-05-17 PROCEDURE — 6370000000 HC RX 637 (ALT 250 FOR IP): Performed by: STUDENT IN AN ORGANIZED HEALTH CARE EDUCATION/TRAINING PROGRAM

## 2020-05-17 PROCEDURE — 2500000003 HC RX 250 WO HCPCS: Performed by: FAMILY MEDICINE

## 2020-05-17 PROCEDURE — 2060000000 HC ICU INTERMEDIATE R&B

## 2020-05-17 RX ORDER — METOPROLOL SUCCINATE 25 MG/1
25 TABLET, EXTENDED RELEASE ORAL DAILY
Status: DISCONTINUED | OUTPATIENT
Start: 2020-05-17 | End: 2020-06-02 | Stop reason: HOSPADM

## 2020-05-17 RX ADMIN — Medication 10 ML: at 08:13

## 2020-05-17 RX ADMIN — TRAZODONE HYDROCHLORIDE 100 MG: 100 TABLET ORAL at 21:31

## 2020-05-17 RX ADMIN — METOCLOPRAMIDE 10 MG: 5 INJECTION, SOLUTION INTRAMUSCULAR; INTRAVENOUS at 14:53

## 2020-05-17 RX ADMIN — CLONAZEPAM 0.5 MG: 0.5 TABLET ORAL at 07:42

## 2020-05-17 RX ADMIN — PANTOPRAZOLE SODIUM 40 MG: 40 INJECTION, POWDER, LYOPHILIZED, FOR SOLUTION INTRAVENOUS at 08:13

## 2020-05-17 RX ADMIN — SODIUM CHLORIDE, PRESERVATIVE FREE 10 ML: 5 INJECTION INTRAVENOUS at 21:32

## 2020-05-17 RX ADMIN — BUSPIRONE HYDROCHLORIDE 20 MG: 10 TABLET ORAL at 21:31

## 2020-05-17 RX ADMIN — TAMSULOSIN HYDROCHLORIDE 0.4 MG: 0.4 CAPSULE ORAL at 07:42

## 2020-05-17 RX ADMIN — AMITRIPTYLINE HYDROCHLORIDE 25 MG: 25 TABLET, FILM COATED ORAL at 14:52

## 2020-05-17 RX ADMIN — AMITRIPTYLINE HYDROCHLORIDE 25 MG: 25 TABLET, FILM COATED ORAL at 07:43

## 2020-05-17 RX ADMIN — BUSPIRONE HYDROCHLORIDE 20 MG: 10 TABLET ORAL at 07:42

## 2020-05-17 RX ADMIN — MORPHINE SULFATE 2 MG: 2 INJECTION, SOLUTION INTRAMUSCULAR; INTRAVENOUS at 14:54

## 2020-05-17 RX ADMIN — GABAPENTIN 300 MG: 300 CAPSULE ORAL at 07:42

## 2020-05-17 RX ADMIN — FUROSEMIDE 20 MG: 20 TABLET ORAL at 07:42

## 2020-05-17 RX ADMIN — LORAZEPAM 1 MG: 2 INJECTION INTRAMUSCULAR at 07:43

## 2020-05-17 RX ADMIN — QUETIAPINE FUMARATE 100 MG: 100 TABLET ORAL at 21:31

## 2020-05-17 RX ADMIN — BISACODYL 10 MG: 10 SUPPOSITORY RECTAL at 07:42

## 2020-05-17 RX ADMIN — MORPHINE SULFATE 2 MG: 2 INJECTION, SOLUTION INTRAMUSCULAR; INTRAVENOUS at 17:46

## 2020-05-17 RX ADMIN — LORAZEPAM 1 MG: 2 INJECTION INTRAMUSCULAR at 22:08

## 2020-05-17 RX ADMIN — METOCLOPRAMIDE 10 MG: 5 INJECTION, SOLUTION INTRAMUSCULAR; INTRAVENOUS at 21:32

## 2020-05-17 RX ADMIN — SODIUM CHLORIDE, PRESERVATIVE FREE 10 ML: 5 INJECTION INTRAVENOUS at 08:14

## 2020-05-17 RX ADMIN — AMITRIPTYLINE HYDROCHLORIDE 25 MG: 25 TABLET, FILM COATED ORAL at 21:32

## 2020-05-17 RX ADMIN — LORAZEPAM 1 MG: 2 INJECTION INTRAMUSCULAR at 03:36

## 2020-05-17 RX ADMIN — POTASSIUM CHLORIDE: 2 INJECTION, SOLUTION, CONCENTRATE INTRAVENOUS at 17:48

## 2020-05-17 RX ADMIN — ESCITALOPRAM OXALATE 20 MG: 10 TABLET ORAL at 07:42

## 2020-05-17 RX ADMIN — BUSPIRONE HYDROCHLORIDE 20 MG: 10 TABLET ORAL at 14:53

## 2020-05-17 RX ADMIN — MORPHINE SULFATE 2 MG: 2 INJECTION, SOLUTION INTRAMUSCULAR; INTRAVENOUS at 11:56

## 2020-05-17 RX ADMIN — MORPHINE SULFATE 2 MG: 2 INJECTION, SOLUTION INTRAMUSCULAR; INTRAVENOUS at 07:43

## 2020-05-17 RX ADMIN — LORAZEPAM 1 MG: 2 INJECTION INTRAMUSCULAR at 17:46

## 2020-05-17 RX ADMIN — CLONAZEPAM 0.5 MG: 0.5 TABLET ORAL at 21:32

## 2020-05-17 RX ADMIN — GABAPENTIN 300 MG: 300 CAPSULE ORAL at 14:53

## 2020-05-17 RX ADMIN — METOCLOPRAMIDE 10 MG: 5 INJECTION, SOLUTION INTRAMUSCULAR; INTRAVENOUS at 09:37

## 2020-05-17 RX ADMIN — MORPHINE SULFATE 2 MG: 2 INJECTION, SOLUTION INTRAMUSCULAR; INTRAVENOUS at 21:33

## 2020-05-17 RX ADMIN — METOPROLOL TARTRATE 5 MG: 1 INJECTION, SOLUTION INTRAVENOUS at 03:36

## 2020-05-17 RX ADMIN — METOPROLOL SUCCINATE 25 MG: 25 TABLET, FILM COATED, EXTENDED RELEASE ORAL at 10:00

## 2020-05-17 RX ADMIN — GABAPENTIN 300 MG: 300 CAPSULE ORAL at 21:31

## 2020-05-17 RX ADMIN — ENOXAPARIN SODIUM 40 MG: 40 INJECTION SUBCUTANEOUS at 07:42

## 2020-05-17 RX ADMIN — LORAZEPAM 1 MG: 2 INJECTION INTRAMUSCULAR at 11:55

## 2020-05-17 RX ADMIN — METOCLOPRAMIDE 10 MG: 5 INJECTION, SOLUTION INTRAMUSCULAR; INTRAVENOUS at 03:36

## 2020-05-17 ASSESSMENT — PAIN DESCRIPTION - PAIN TYPE: TYPE: CHRONIC PAIN

## 2020-05-17 ASSESSMENT — PAIN DESCRIPTION - ORIENTATION: ORIENTATION: LOWER

## 2020-05-17 ASSESSMENT — PAIN SCALES - GENERAL
PAINLEVEL_OUTOF10: 7
PAINLEVEL_OUTOF10: 8
PAINLEVEL_OUTOF10: 8
PAINLEVEL_OUTOF10: 0
PAINLEVEL_OUTOF10: 7

## 2020-05-17 ASSESSMENT — PAIN DESCRIPTION - LOCATION: LOCATION: ABDOMEN

## 2020-05-17 ASSESSMENT — PAIN DESCRIPTION - PROGRESSION: CLINICAL_PROGRESSION: NOT CHANGED

## 2020-05-17 NOTE — CARE COORDINATION
Patient to have an EGD completed tomorrow. Goal for discharge [facility]. [facility] is following. 2nd choice [facility].

## 2020-05-17 NOTE — PROGRESS NOTES
ULTRASOUND       5/16/2020 12:45 pm       COMPARISON:   None.       HISTORY:   ORDERING SYSTEM PROVIDED HISTORY: elevated LFts, abd pain and nauseea   TECHNOLOGIST PROVIDED HISTORY:   elevated LFts, abd pain and nauseea       FINDINGS:   LIVER:  The liver demonstrates normal echogenicity without evidence of   intrahepatic biliary ductal dilatation.  No morphologic evidence for   cirrhosis.       BILIARY SYSTEM:  Cholelithiasis without wall thickening or evidence for   pericholecystic fluid.       Mildly dilated common duct measuring 8 mm.       RIGHT KIDNEY: The right kidney is grossly unremarkable without evidence of   hydronephrosis.       PANCREAS:  Not well visualized.       OTHER: No evidence of right upper quadrant ascites.           Impression   Cholelithiasis without evidence for acute cholecystitis.       Enlargement of the common duct measuring up to 8 mm without intrahepatic   biliary dilatation.  This appears new since prior CT imaging.  Consider   further evaluation with MRI/MRCP if clinically appropriate. ASSESSMENT   77 y/o female with gastric outlet obstruction  - US RUQ with dilated CBD compared to CT, as of 5/14 no elevation in bilirubin     Plan  1. Plan for EGD with GI on Monday. Recommend pyloric botox injections   2.  TPN  3. Medical management and supportive care per primary team     Electronically signed by John Lopez DO  on 5/17/2020 at 7:02 AM

## 2020-05-17 NOTE — PROGRESS NOTES
mL/hr at 20 1829     PRN Meds: LORazepam, morphine, acetaminophen, sodium chloride flush, potassium chloride **OR** potassium alternative oral replacement **OR** potassium chloride, magnesium sulfate, nicotine    Data:     Past Medical History:   has a past medical history of Anxiety, Arthritis, Back pain, Bronchitis, Caffeine use, Carpal tunnel syndrome, Cataracts, bilateral, Constipation, Depression, Diarrhea, GERD (gastroesophageal reflux disease), H/O gastric ulcer, Hyperlipidemia, Hypertension, Mumps, MVP (mitral valve prolapse), Recurrent UTI, Sinusitis, Tracheostomy in place [facility]), Ulcerative esophagitis, and Wellness examination. Social History:   reports that she has been smoking cigarettes. She has a 50.00 pack-year smoking history. She has never used smokeless tobacco. She reports that she does not drink alcohol or use drugs. Family History:   Family History   Problem Relation Age of Onset   Demetrius Mayorga Cancer Mother         uterine    Heart Attack Mother     Heart Attack Father     Other Maternal Grandfather         foot gangrene    Other Paternal Grandmother         bleeding ulcers    Other Paternal Grandfather         lung cancer       Vitals:  BP 96/63   Pulse 92   Temp 97.9 °F (36.6 °C) (Oral)   Resp 20   Ht 5' 3\" (1.6 m)   Wt 189 lb 13.1 oz (86.1 kg)   SpO2 94%   BMI 33.62 kg/m²   Temp (24hrs), Av.9 °F (36.6 °C), Min:97.7 °F (36.5 °C), Max:98.4 °F (36.9 °C)    Recent Labs     20  0609 20  1102 20  2346 20  0723   POCGLU 110* 105 114* 111*       I/O (24Hr):     Intake/Output Summary (Last 24 hours) at 2020 1252  Last data filed at 2020 2996  Gross per 24 hour   Intake 2627 ml   Output 6701 ml   Net -4074 ml       Labs:  Hematology:  Recent Labs     05/15/20  0426 20  0547 20  0621   WBC 8.9 9.2 11.1   RBC 3.53* 3.42* 3.57*   HGB 9.5* 9.3* 9.9*   HCT 32.1* 32.3* 32.5*   MCV 90.9 94.4 91.0   MCH 26.9 27.2 27.7   MCHC 29.6 28.8 30.5   RDW Gastric outlet obstruction 5/16/2020 Yes    MVP (mitral valve prolapse) 5/12/2020 Yes    Centrilobular emphysema ([address] 5/12/2020 Yes    Fever 5/14/2020 Yes    Recurrent UTI 5/12/2020 Yes    Overview Signed 5/12/2020  5:22 PM by MD Dr. Wood Valentino         Tracheostomy in place [facility]) 5/12/2020 Yes    H/O gastric ulcer 5/12/2020 Yes    Hyperlipidemia 5/12/2020 Yes    Hypertension 5/12/2020 Yes    Tobacco abuse 5/12/2020 Yes    Chronic respiratory failure with hypoxia ([address] 5/12/2020 Yes    Status post insertion of percutaneous endoscopic gastrostomy (PEG) tube ([address] 5/12/2020 Yes    S/P Nissen fundoplication (without gastrostomy tube) procedure 5/12/2020 Yes          Plan:          S/p Mark fundoplication     Gastric outlet obstruction: Gi consulted by surgery for possible pyloric botox injection    Intractable nausea and vomiting/tube feeds intolerance: Likely secondary to above,  tube feed continue to be on hold  Patient is placed o CLD and was throwing up when I was in the room   Recurrent UTI: No evidence of current UTI, no need for antibiotics, ID on board. CBD dilation without intra biliary dilation : GI plan for MRCP today     At risk of malnutrition: Continue TPN for now    Chronic respiratory failure with hypoxia/tracheostomy in place: Trach care per respiratory therapy, continue supplemental oxygen.     Urinary retention: Patient refusing intermittent cath, urology consulted recommends to keep the [place] in, Flomax added    COPD: Continue inhalers     History of bipolar/anxiety: Continue psych medications, continue Ativan for anxiety    HTN: continue home medications    HPL: continue home medications    DM2: Continue diabetes home medications , insulin sliding scale, hypoglycemia protocol    DVT prophylaxis    Discussed with the patient and the nurse    Discussed with GI NP     Jennifer Moreau MD  5/17/2020  12:52 PM

## 2020-05-17 NOTE — PLAN OF CARE
Problem: Pain:  Goal: Pain level will decrease  Description: Pain level will decrease  Outcome: Ongoing   Pt's pain assessed frequently with hourly rounding; assessed all pain characteristics including level, type, location, frequency, and onset. Pt medicated by RN per PRN orders. Non-pharmacologic interventions offered to pt as well. Pt states pain is tolerable at this time. Will continue to monitor. Problem: Falls - Risk of:  Goal: Will remain free from falls  Description: Will remain free from falls  Outcome: Ongoing   Pt remains free from falls at this time. Floor free from obstacles, and bed is locked and in lowest position. Adequate lighting provided. Call light within reach; pt encouraged to call before getting OOB for any need. Will continue to monitor needs during hourly rounding. Problem: Skin Integrity:  Goal: Skin integrity will stabilize  Description: Skin integrity will stabilize  Outcome: Ongoing   Full skin assessment completed this shift. No new skin breakdown at this time. Pt repositioned q2h and prn. Pt kept clean and dry. Gel overlay mattress in place on bed, heels floated. Will continue to monitor.     Electronically signed by Robert Loaiza RN on 5/17/2020 at 3:19 AM  \

## 2020-05-17 NOTE — PROGRESS NOTES
· Current Body Wt: 189 lb 13.1 oz (86.1 kg)  · Admission Body Wt: 181 lb (82.1 kg)  · Usual Body Wt: 158 lb (71.7 kg)(per pt )  · % Weight Change: 179-186 lbs over 3 mo per EMR   · Ideal Body Wt: 115 lb (52.2 kg), % Ideal Body 157% adm/ideal  · BMI Classification: BMI 30.0 - 34.9 Obese Class I    Nutrition Interventions:   Continue current diet, Start ONS, Continue Parenteral Nutrition  Continued Inpatient Monitoring, Education Not Indicated    Nutrition Evaluation:   · Evaluation: Achieved with PN and oral diet   · Goals: meet % of estimated nutrition needs with nutrition support/oral diet   · Monitoring: Meal Intake, Supplement Intake, Diet Tolerance, Nutrition Progression, PN Intake, PN Tolerance, I&O, Weight, Pertinent Labs, Monitor Bowel Function      Electronically signed by Lizeth Douglas RD, LD on 5/17/20 at 1:57 PM EDT    Contact Number: [phone]

## 2020-05-17 NOTE — PROGRESS NOTES
placement (N/A, 3/27/2020); and tracheostomy (N/A, 4/2/2020). Restrictions  Restrictions/Precautions  Restrictions/Precautions: General Precautions, Fall Risk, Up as Tolerated  Required Braces or Orthoses?: No  Position Activity Restriction  Other position/activity restrictions: Up with assistance   Subjective   General  Patient assessed for rehabilitation services?: Yes  Family / Caregiver Present: No  Pain Assessment  Pain Assessment: 0-10  Pain Level: 7  Pain Type: Chronic pain  Pain Location: Abdomen  Pain Orientation: Lower  Clinical Progression: Not changed  Response to Pain Intervention: Patient Satisfied(RN notified of need for pain meds)  Vital Signs  Patient Currently in Pain: Yes      Objective    ADL  Feeding: Minimal assistance;Setup; Increased time to complete(G-tube in place, clearn liquid diet)  Grooming: Stand by assistance;Minimal assistance;Maximum assistance;Setup; Increased time to complete(SBA-wash face, Min-shave chin and oral care to hold emesis basin and water for rinsing, Max-comb hair)  UE Bathing: Moderate assistance;Setup; Increased time to complete(pt would need assist w/back and BUE)  LE Bathing: Maximum assistance;Setup; Increased time to complete(pt is able to reach tops of legs only)  UE Dressing: Minimal assistance;Setup(would need assist w/gown)  LE Dressing: Maximum assistance;Setup(w/footies)  Toileting: Maximum assistance(ext female cath and brief)      Pt in bed upon arrival. Setup at tray table for grooming and demon U/LB bathing(see above for LOF). Pt remained in bed w/HOB elevated for self care d/t pain of 7/10 in abdomen. Call light in reach, RN notified. RN gave pain meds. Lunch tray arrived and setup for pt. Pt needed increased time to complete d/t pain and needed rest breaks.          Bed mobility  Comment: Pt declined eob d/t pain 7/10 in abdomen    Plan   Plan  Times per week: 3-5x/wk   Current Treatment Recommendations: Functional Mobility Training, Endurance Training,

## 2020-05-17 NOTE — PROGRESS NOTES
remains somewhat narrow and caliber. Contrast appears to pole within the somewhat dependent portions of the esophagus. Contrast flows into the stomach. Postsurgical changes near the GE junction from prior hiatal hernia but the gastric fundus appears subdiaphragmatic. Dilated esophagus without evidence of esophageal outlet obstruction; contrast flows into the stomach. Somewhat narrow caliber of the GE junction. Us Gallbladder Ruq    Result Date: 5/16/2020  EXAMINATION: RIGHT UPPER QUADRANT ULTRASOUND 5/16/2020 12:45 pm COMPARISON: None. HISTORY: ORDERING SYSTEM PROVIDED HISTORY: elevated LFts, abd pain and nauseea TECHNOLOGIST PROVIDED HISTORY: elevated LFts, abd pain and nauseea FINDINGS: LIVER:  The liver demonstrates normal echogenicity without evidence of intrahepatic biliary ductal dilatation. No morphologic evidence for cirrhosis. BILIARY SYSTEM:  Cholelithiasis without wall thickening or evidence for pericholecystic fluid. Mildly dilated common duct measuring 8 mm. RIGHT KIDNEY: The right kidney is grossly unremarkable without evidence of hydronephrosis. PANCREAS:  Not well visualized. OTHER: No evidence of right upper quadrant ascites. Cholelithiasis without evidence for acute cholecystitis. Enlargement of the common duct measuring up to 8 mm without intrahepatic biliary dilatation. This appears new since prior CT imaging. Consider further evaluation with MRI/MRCP if clinically appropriate. Xr Chest Portable    Result Date: 5/13/2020  EXAMINATION: ONE XRAY VIEW OF THE CHEST 5/13/2020 7:05 pm COMPARISON: April 2, 2020 HISTORY: ORDERING SYSTEM PROVIDED HISTORY: fever, cough TECHNOLOGIST PROVIDED HISTORY: fever, cough FINDINGS: PICC line on the right and trach tube stable. Heart and mediastinum normal. Moderate increase in interstitial markings improved. Bony thorax intact.      Moderate edema improved     Fl Modified Barium Swallow W Video    Result Date: 5/15/2020  EXAMINATION: MODIFIED

## 2020-05-17 NOTE — PROGRESS NOTES
 Wellness examination     Dr. Jose Urrutia seen in Jan 2020       Past Surgical  History:     Past Surgical History:   Procedure Laterality Date    APPENDECTOMY      CARPAL TUNNEL RELEASE      COLONOSCOPY      CYSTOSCOPY      EGD  3/17/2020         EYE SURGERY      patricia IOL    GASTRIC FUNDOPLICATION N/A 7/18/3893    XI LAPAROSCOPIC ROBOTIC HIATAL HERNIA REPAIR, CHERYL FUNDOPLICATION performed by Brian Martinez MD at [facility] N/A 3/27/2020    EGD PEG TUBE PLACEMENT performed by Brian Martinez MD at [address] CATH POWER PICC TRIPLE  3/4/2020         HIATAL HERNIA REPAIR  02/24/2020    LAPAROSCOPIC ROBOTIC HIATAL HERNIA REPAIR, CHERYL FUNDOPLICATION     HYSTERECTOMY      Abdominal    JOINT REPLACEMENT Right     right hip    OVARY REMOVAL      RHINOPLASTY      TONSILLECTOMY      TOTAL HIP ARTHROPLASTY      TOTAL NEPHRECTOMY      atrophic from infections, age 13   Praful Coad TRACHEOSTOMY N/A 3/27/2020    **ADD ON **WANTS 10:00AM **TRACHEOTOMY performed by Brian Martinez MD at [address] N/A 4/2/2020    TRACHEOTOMY EXCHANGE performed by Brian Martinez MD at [address] [place] [address] N/A 3/17/2020    BEDSIDE EGD ESOPHAGOGASTRODUODENOSCOPY (ICU) performed by Brian Martinez MD at [facility]       Medications:      metoprolol succinate  25 mg Oral Daily    [Held by provider] furosemide  20 mg Oral Daily    bisacodyl  10 mg Rectal Daily    clonazePAM  0.5 mg Oral BID    QUEtiapine  100 mg Oral Nightly    traZODone  100 mg Oral Nightly    tamsulosin  0.4 mg Oral Daily    metoclopramide  10 mg Intravenous Q6H    sodium chloride flush  10 mL Intravenous 2 times per day    enoxaparin  40 mg Subcutaneous Daily    pantoprazole  40 mg Intravenous Daily    And    sodium chloride (PF)  10 mL Intravenous Daily    amitriptyline  25 mg Oral TID    busPIRone  20 mg Oral TID    escitalopram  20 mg Constitutional: No fevers or chills. No systemic complaints  Head: No headaches  Eyes: No double vision or blurry vision. No conjunctival inflammation. ENT: No sore throat or runny nose. . No hearing loss, tinnitus or vertigo. Cardiovascular: No chest pain or palpitations. No shortness of breath. No HOLLAND  Lung: No shortness of breath or cough. No sputum production  Abdomen: No nausea, vomiting, diarrhea, or abdominal pain. Yulia Abebe No cramps. Genitourinary: No increased urinary frequency, or dysuria. No hematuria. No suprapubic or CVA pain  Musculoskeletal: No muscle aches or pains. No joint effusions, swelling or deformities  Hematologic: No bleeding or bruising. Neurologic: No headache, weakness, numbness, or tingling. Integument: No rash, no ulcers. Psychiatric: No depression. Endocrine: No polyuria, no polydipsia, no polyphagia. Physical Examination :     Patient Vitals for the past 8 hrs:   BP Temp Temp src Pulse Resp SpO2   05/17/20 1249 96/63 97.9 °F (36.6 °C) Oral 92 20 94 %   05/17/20 0800 -- -- -- 90 -- --     General Appearance: Awake, alert, and in no apparent distress  Head:  Normocephalic, no trauma  Eyes: Pupils equal, round, reactive to light and accommodation; extraocular movements intact; sclera anicteric; conjunctivae pink. No embolic phenomena. ENT: Oropharynx clear, without erythema, exudate, or thrush. No tenderness of sinuses. Mouth/throat: mucosa pink and moist. No lesions. Dentition in good repair. Neck:Supple, without lymphadenopathy. Thyroid normal, No bruits. Tracheostomy in place. Pulmonary/Chest: Clear to auscultation, without wheezes, rales, or rhonchi. No dullness to percussion. Cardiovascular: Regular rate and rhythm without murmurs, rubs, or gallops. Abdomen: Soft, non tender. Bowel sounds normal. No organomegaly. PEG in place. Surgical incisions without inflammation. All four Extremities: No cyanosis, clubbing, edema, or effusions.   Neurologic: No gross sensory or motor reviewed  · Administer medications as ordered  · Prognosis: Guarded  · Discharge planning reviewed  · Follow up as outpatient. Thank you for allowing us to participate in the care of this patient. Please call with questions.     Mart Turner MD  Pager: [phone] - Office: [phone]

## 2020-05-18 ENCOUNTER — ANESTHESIA EVENT (OUTPATIENT)
Dept: ENDOSCOPY | Age: 75
DRG: 326 | End: 2020-05-18
Payer: MEDICARE

## 2020-05-18 ENCOUNTER — ANESTHESIA (OUTPATIENT)
Dept: ENDOSCOPY | Age: 75
DRG: 326 | End: 2020-05-18
Payer: MEDICARE

## 2020-05-18 ENCOUNTER — APPOINTMENT (OUTPATIENT)
Dept: MRI IMAGING | Age: 75
DRG: 326 | End: 2020-05-18
Attending: FAMILY MEDICINE
Payer: MEDICARE

## 2020-05-18 VITALS — SYSTOLIC BLOOD PRESSURE: 109 MMHG | DIASTOLIC BLOOD PRESSURE: 59 MMHG | OXYGEN SATURATION: 98 %

## 2020-05-18 LAB
ABSOLUTE EOS #: 0.14 K/UL (ref 0–0.44)
ABSOLUTE IMMATURE GRANULOCYTE: 0.08 K/UL (ref 0–0.3)
ABSOLUTE LYMPH #: 1.74 K/UL (ref 1.1–3.7)
ABSOLUTE MONO #: 0.92 K/UL (ref 0.1–1.2)
ANION GAP SERPL CALCULATED.3IONS-SCNC: 13 MMOL/L (ref 9–17)
ANTI-NUCLEAR ANTIBODY (ANA): NEGATIVE
BASOPHILS # BLD: 1 % (ref 0–2)
BASOPHILS ABSOLUTE: 0.04 K/UL (ref 0–0.2)
BUN BLDV-MCNC: 31 MG/DL (ref 8–23)
BUN/CREAT BLD: ABNORMAL (ref 9–20)
CALCIUM SERPL-MCNC: 10.3 MG/DL (ref 8.6–10.4)
CHLORIDE BLD-SCNC: 103 MMOL/L (ref 98–107)
CO2: 25 MMOL/L (ref 20–31)
CREAT SERPL-MCNC: 0.81 MG/DL (ref 0.5–0.9)
DIFFERENTIAL TYPE: ABNORMAL
EOSINOPHILS RELATIVE PERCENT: 2 % (ref 1–4)
GFR AFRICAN AMERICAN: >60 ML/MIN
GFR NON-AFRICAN AMERICAN: >60 ML/MIN
GFR SERPL CREATININE-BSD FRML MDRD: ABNORMAL ML/MIN/{1.73_M2}
GFR SERPL CREATININE-BSD FRML MDRD: ABNORMAL ML/MIN/{1.73_M2}
GLUCOSE BLD-MCNC: 111 MG/DL (ref 65–105)
GLUCOSE BLD-MCNC: 112 MG/DL (ref 65–105)
GLUCOSE BLD-MCNC: 118 MG/DL (ref 65–105)
GLUCOSE BLD-MCNC: 118 MG/DL (ref 65–105)
GLUCOSE BLD-MCNC: 123 MG/DL (ref 70–99)
GLUCOSE BLD-MCNC: 133 MG/DL (ref 65–105)
HCT VFR BLD CALC: 34.8 % (ref 36.3–47.1)
HEMOGLOBIN: 10 G/DL (ref 11.9–15.1)
IMMATURE GRANULOCYTES: 1 %
LYMPHOCYTES # BLD: 22 % (ref 24–43)
MAGNESIUM: 2.1 MG/DL (ref 1.6–2.6)
MCH RBC QN AUTO: 27 PG (ref 25.2–33.5)
MCHC RBC AUTO-ENTMCNC: 28.7 G/DL (ref 28.4–34.8)
MCV RBC AUTO: 93.8 FL (ref 82.6–102.9)
MONOCYTES # BLD: 12 % (ref 3–12)
NRBC AUTOMATED: 0 PER 100 WBC
PDW BLD-RTO: 18.4 % (ref 11.8–14.4)
PHOSPHORUS: 4.4 MG/DL (ref 2.6–4.5)
PLATELET # BLD: 339 K/UL (ref 138–453)
PLATELET ESTIMATE: ABNORMAL
PMV BLD AUTO: 10.7 FL (ref 8.1–13.5)
POTASSIUM SERPL-SCNC: 4 MMOL/L (ref 3.7–5.3)
RBC # BLD: 3.71 M/UL (ref 3.95–5.11)
RBC # BLD: ABNORMAL 10*6/UL
SEG NEUTROPHILS: 62 % (ref 36–65)
SEGMENTED NEUTROPHILS ABSOLUTE COUNT: 4.91 K/UL (ref 1.5–8.1)
SODIUM BLD-SCNC: 141 MMOL/L (ref 135–144)
WBC # BLD: 7.8 K/UL (ref 3.5–11.3)
WBC # BLD: ABNORMAL 10*3/UL

## 2020-05-18 PROCEDURE — 43239 EGD BIOPSY SINGLE/MULTIPLE: CPT | Performed by: INTERNAL MEDICINE

## 2020-05-18 PROCEDURE — C9113 INJ PANTOPRAZOLE SODIUM, VIA: HCPCS | Performed by: INTERNAL MEDICINE

## 2020-05-18 PROCEDURE — 7100000011 HC PHASE II RECOVERY - ADDTL 15 MIN: Performed by: INTERNAL MEDICINE

## 2020-05-18 PROCEDURE — 6360000002 HC RX W HCPCS: Performed by: NURSE ANESTHETIST, CERTIFIED REGISTERED

## 2020-05-18 PROCEDURE — 6360000002 HC RX W HCPCS: Performed by: INTERNAL MEDICINE

## 2020-05-18 PROCEDURE — 99232 SBSQ HOSP IP/OBS MODERATE 35: CPT | Performed by: FAMILY MEDICINE

## 2020-05-18 PROCEDURE — 2709999900 HC NON-CHARGEABLE SUPPLY: Performed by: INTERNAL MEDICINE

## 2020-05-18 PROCEDURE — 6360000002 HC RX W HCPCS: Performed by: NURSE PRACTITIONER

## 2020-05-18 PROCEDURE — 6360000002 HC RX W HCPCS: Performed by: ANESTHESIOLOGY

## 2020-05-18 PROCEDURE — 1200000000 HC SEMI PRIVATE

## 2020-05-18 PROCEDURE — 84100 ASSAY OF PHOSPHORUS: CPT

## 2020-05-18 PROCEDURE — 82947 ASSAY GLUCOSE BLOOD QUANT: CPT

## 2020-05-18 PROCEDURE — 2580000003 HC RX 258: Performed by: INTERNAL MEDICINE

## 2020-05-18 PROCEDURE — 3700000000 HC ANESTHESIA ATTENDED CARE: Performed by: INTERNAL MEDICINE

## 2020-05-18 PROCEDURE — 3609017700 HC EGD DILATION GASTRIC/DUODENAL STRICTURE: Performed by: INTERNAL MEDICINE

## 2020-05-18 PROCEDURE — 7100000010 HC PHASE II RECOVERY - FIRST 15 MIN: Performed by: INTERNAL MEDICINE

## 2020-05-18 PROCEDURE — 43245 EGD DILATE STRICTURE: CPT | Performed by: INTERNAL MEDICINE

## 2020-05-18 PROCEDURE — 3700000001 HC ADD 15 MINUTES (ANESTHESIA): Performed by: INTERNAL MEDICINE

## 2020-05-18 PROCEDURE — 2580000003 HC RX 258: Performed by: NURSE PRACTITIONER

## 2020-05-18 PROCEDURE — 2500000003 HC RX 250 WO HCPCS: Performed by: NURSE ANESTHETIST, CERTIFIED REGISTERED

## 2020-05-18 PROCEDURE — 3609012400 HC EGD TRANSORAL BIOPSY SINGLE/MULTIPLE: Performed by: INTERNAL MEDICINE

## 2020-05-18 PROCEDURE — 88305 TISSUE EXAM BY PATHOLOGIST: CPT

## 2020-05-18 PROCEDURE — 36415 COLL VENOUS BLD VENIPUNCTURE: CPT

## 2020-05-18 PROCEDURE — 6370000000 HC RX 637 (ALT 250 FOR IP): Performed by: NURSE PRACTITIONER

## 2020-05-18 PROCEDURE — 74181 MRI ABDOMEN W/O CONTRAST: CPT

## 2020-05-18 PROCEDURE — 0DB68ZX EXCISION OF STOMACH, VIA NATURAL OR ARTIFICIAL OPENING ENDOSCOPIC, DIAGNOSTIC: ICD-10-PCS | Performed by: INTERNAL MEDICINE

## 2020-05-18 PROCEDURE — C1726 CATH, BAL DIL, NON-VASCULAR: HCPCS | Performed by: INTERNAL MEDICINE

## 2020-05-18 PROCEDURE — 99232 SBSQ HOSP IP/OBS MODERATE 35: CPT | Performed by: SURGERY

## 2020-05-18 PROCEDURE — 99232 SBSQ HOSP IP/OBS MODERATE 35: CPT | Performed by: INTERNAL MEDICINE

## 2020-05-18 PROCEDURE — C9113 INJ PANTOPRAZOLE SODIUM, VIA: HCPCS | Performed by: NURSE PRACTITIONER

## 2020-05-18 PROCEDURE — 2500000003 HC RX 250 WO HCPCS: Performed by: FAMILY MEDICINE

## 2020-05-18 PROCEDURE — 6370000000 HC RX 637 (ALT 250 FOR IP): Performed by: INTERNAL MEDICINE

## 2020-05-18 PROCEDURE — 0D768ZZ DILATION OF STOMACH, VIA NATURAL OR ARTIFICIAL OPENING ENDOSCOPIC: ICD-10-PCS | Performed by: INTERNAL MEDICINE

## 2020-05-18 PROCEDURE — 80048 BASIC METABOLIC PNL TOTAL CA: CPT

## 2020-05-18 PROCEDURE — 6370000000 HC RX 637 (ALT 250 FOR IP): Performed by: STUDENT IN AN ORGANIZED HEALTH CARE EDUCATION/TRAINING PROGRAM

## 2020-05-18 PROCEDURE — 97110 THERAPEUTIC EXERCISES: CPT

## 2020-05-18 PROCEDURE — 83735 ASSAY OF MAGNESIUM: CPT

## 2020-05-18 PROCEDURE — 2580000003 HC RX 258: Performed by: ANESTHESIOLOGY

## 2020-05-18 PROCEDURE — 85025 COMPLETE CBC W/AUTO DIFF WBC: CPT

## 2020-05-18 PROCEDURE — 6370000000 HC RX 637 (ALT 250 FOR IP): Performed by: FAMILY MEDICINE

## 2020-05-18 RX ORDER — MIDAZOLAM HYDROCHLORIDE 1 MG/ML
1 INJECTION INTRAMUSCULAR; INTRAVENOUS EVERY 5 MIN PRN
Status: COMPLETED | OUTPATIENT
Start: 2020-05-18 | End: 2020-05-18

## 2020-05-18 RX ORDER — LIDOCAINE HYDROCHLORIDE 10 MG/ML
INJECTION, SOLUTION EPIDURAL; INFILTRATION; INTRACAUDAL; PERINEURAL PRN
Status: DISCONTINUED | OUTPATIENT
Start: 2020-05-18 | End: 2020-05-18 | Stop reason: SDUPTHER

## 2020-05-18 RX ORDER — SUCRALFATE 1 G/1
1 TABLET ORAL EVERY 6 HOURS SCHEDULED
Status: DISCONTINUED | OUTPATIENT
Start: 2020-05-18 | End: 2020-06-02 | Stop reason: HOSPADM

## 2020-05-18 RX ORDER — SODIUM CHLORIDE 9 MG/ML
10 INJECTION INTRAVENOUS 2 TIMES DAILY
Status: DISCONTINUED | OUTPATIENT
Start: 2020-05-18 | End: 2020-06-02 | Stop reason: HOSPADM

## 2020-05-18 RX ORDER — FENTANYL CITRATE 50 UG/ML
50 INJECTION, SOLUTION INTRAMUSCULAR; INTRAVENOUS EVERY 5 MIN PRN
Status: DISCONTINUED | OUTPATIENT
Start: 2020-05-18 | End: 2020-05-30

## 2020-05-18 RX ORDER — FENTANYL CITRATE 50 UG/ML
25 INJECTION, SOLUTION INTRAMUSCULAR; INTRAVENOUS EVERY 5 MIN PRN
Status: DISCONTINUED | OUTPATIENT
Start: 2020-05-18 | End: 2020-05-30

## 2020-05-18 RX ORDER — ONDANSETRON 2 MG/ML
4 INJECTION INTRAMUSCULAR; INTRAVENOUS
Status: ACTIVE | OUTPATIENT
Start: 2020-05-18 | End: 2020-05-18

## 2020-05-18 RX ORDER — HYDRALAZINE HYDROCHLORIDE 20 MG/ML
5 INJECTION INTRAMUSCULAR; INTRAVENOUS EVERY 10 MIN PRN
Status: DISCONTINUED | OUTPATIENT
Start: 2020-05-18 | End: 2020-05-30

## 2020-05-18 RX ORDER — 0.9 % SODIUM CHLORIDE 0.9 %
500 INTRAVENOUS SOLUTION INTRAVENOUS
Status: ACTIVE | OUTPATIENT
Start: 2020-05-18 | End: 2020-05-18

## 2020-05-18 RX ORDER — SODIUM CHLORIDE, SODIUM LACTATE, POTASSIUM CHLORIDE, CALCIUM CHLORIDE 600; 310; 30; 20 MG/100ML; MG/100ML; MG/100ML; MG/100ML
INJECTION, SOLUTION INTRAVENOUS CONTINUOUS
Status: DISCONTINUED | OUTPATIENT
Start: 2020-05-18 | End: 2020-05-19

## 2020-05-18 RX ORDER — PANTOPRAZOLE SODIUM 40 MG/10ML
40 INJECTION, POWDER, LYOPHILIZED, FOR SOLUTION INTRAVENOUS 2 TIMES DAILY
Status: DISCONTINUED | OUTPATIENT
Start: 2020-05-18 | End: 2020-06-02 | Stop reason: HOSPADM

## 2020-05-18 RX ORDER — OXYCODONE HYDROCHLORIDE AND ACETAMINOPHEN 5; 325 MG/1; MG/1
1 TABLET ORAL EVERY 4 HOURS PRN
Status: DISCONTINUED | OUTPATIENT
Start: 2020-05-18 | End: 2020-06-02 | Stop reason: HOSPADM

## 2020-05-18 RX ORDER — PROPOFOL 10 MG/ML
INJECTION, EMULSION INTRAVENOUS PRN
Status: DISCONTINUED | OUTPATIENT
Start: 2020-05-18 | End: 2020-05-18 | Stop reason: SDUPTHER

## 2020-05-18 RX ORDER — LABETALOL 20 MG/4 ML (5 MG/ML) INTRAVENOUS SYRINGE
5 EVERY 10 MIN PRN
Status: DISCONTINUED | OUTPATIENT
Start: 2020-05-18 | End: 2020-06-02 | Stop reason: HOSPADM

## 2020-05-18 RX ORDER — DIPHENHYDRAMINE HYDROCHLORIDE 50 MG/ML
12.5 INJECTION INTRAMUSCULAR; INTRAVENOUS
Status: ACTIVE | OUTPATIENT
Start: 2020-05-18 | End: 2020-05-18

## 2020-05-18 RX ORDER — SUCRALFATE ORAL 1 G/10ML
1 SUSPENSION ORAL EVERY 6 HOURS SCHEDULED
Status: DISCONTINUED | OUTPATIENT
Start: 2020-05-18 | End: 2020-05-18

## 2020-05-18 RX ADMIN — GABAPENTIN 300 MG: 300 CAPSULE ORAL at 20:52

## 2020-05-18 RX ADMIN — BUSPIRONE HYDROCHLORIDE 20 MG: 10 TABLET ORAL at 20:52

## 2020-05-18 RX ADMIN — MORPHINE SULFATE 2 MG: 2 INJECTION, SOLUTION INTRAMUSCULAR; INTRAVENOUS at 04:50

## 2020-05-18 RX ADMIN — SUCRALFATE 1 G: 1 TABLET ORAL at 20:52

## 2020-05-18 RX ADMIN — SODIUM CHLORIDE, POTASSIUM CHLORIDE, SODIUM LACTATE AND CALCIUM CHLORIDE 100 ML/HR: 600; 310; 30; 20 INJECTION, SOLUTION INTRAVENOUS at 17:40

## 2020-05-18 RX ADMIN — PROPOFOL 30 MG: 10 INJECTION, EMULSION INTRAVENOUS at 18:43

## 2020-05-18 RX ADMIN — MORPHINE SULFATE 2 MG: 2 INJECTION, SOLUTION INTRAMUSCULAR; INTRAVENOUS at 07:59

## 2020-05-18 RX ADMIN — MORPHINE SULFATE 2 MG: 2 INJECTION, SOLUTION INTRAMUSCULAR; INTRAVENOUS at 23:59

## 2020-05-18 RX ADMIN — GABAPENTIN 300 MG: 300 CAPSULE ORAL at 09:51

## 2020-05-18 RX ADMIN — PANTOPRAZOLE SODIUM 40 MG: 40 INJECTION, POWDER, LYOPHILIZED, FOR SOLUTION INTRAVENOUS at 20:52

## 2020-05-18 RX ADMIN — PROPOFOL 50 MG: 10 INJECTION, EMULSION INTRAVENOUS at 18:32

## 2020-05-18 RX ADMIN — SODIUM CHLORIDE: 234 INJECTION INTRAMUSCULAR; INTRAVENOUS; SUBCUTANEOUS at 19:44

## 2020-05-18 RX ADMIN — METOPROLOL SUCCINATE 25 MG: 25 TABLET, FILM COATED, EXTENDED RELEASE ORAL at 09:50

## 2020-05-18 RX ADMIN — LORAZEPAM 1 MG: 2 INJECTION INTRAMUSCULAR at 06:06

## 2020-05-18 RX ADMIN — ESCITALOPRAM OXALATE 20 MG: 10 TABLET ORAL at 09:50

## 2020-05-18 RX ADMIN — LORAZEPAM 1 MG: 2 INJECTION INTRAMUSCULAR at 01:54

## 2020-05-18 RX ADMIN — MIDAZOLAM HYDROCHLORIDE 1 MG: 1 INJECTION, SOLUTION INTRAMUSCULAR; INTRAVENOUS at 18:02

## 2020-05-18 RX ADMIN — METOCLOPRAMIDE 10 MG: 5 INJECTION, SOLUTION INTRAMUSCULAR; INTRAVENOUS at 23:00

## 2020-05-18 RX ADMIN — AMITRIPTYLINE HYDROCHLORIDE 25 MG: 25 TABLET, FILM COATED ORAL at 09:50

## 2020-05-18 RX ADMIN — TRAZODONE HYDROCHLORIDE 100 MG: 100 TABLET ORAL at 20:52

## 2020-05-18 RX ADMIN — MORPHINE SULFATE 2 MG: 2 INJECTION, SOLUTION INTRAMUSCULAR; INTRAVENOUS at 12:17

## 2020-05-18 RX ADMIN — PANTOPRAZOLE SODIUM 40 MG: 40 INJECTION, POWDER, LYOPHILIZED, FOR SOLUTION INTRAVENOUS at 09:51

## 2020-05-18 RX ADMIN — PROPOFOL 50 MG: 10 INJECTION, EMULSION INTRAVENOUS at 18:30

## 2020-05-18 RX ADMIN — PROPOFOL 20 MG: 10 INJECTION, EMULSION INTRAVENOUS at 18:44

## 2020-05-18 RX ADMIN — MIDAZOLAM HYDROCHLORIDE 1 MG: 1 INJECTION, SOLUTION INTRAMUSCULAR; INTRAVENOUS at 17:45

## 2020-05-18 RX ADMIN — CLONAZEPAM 0.5 MG: 0.5 TABLET ORAL at 09:49

## 2020-05-18 RX ADMIN — Medication 10 ML: at 20:53

## 2020-05-18 RX ADMIN — Medication 10 ML: at 11:06

## 2020-05-18 RX ADMIN — BISACODYL 10 MG: 10 SUPPOSITORY RECTAL at 09:00

## 2020-05-18 RX ADMIN — LORAZEPAM 1 MG: 2 INJECTION INTRAMUSCULAR at 11:01

## 2020-05-18 RX ADMIN — Medication 10 ML: at 22:48

## 2020-05-18 RX ADMIN — PHENYLEPHRINE HYDROCHLORIDE 50 MCG: 10 INJECTION INTRAVENOUS at 18:43

## 2020-05-18 RX ADMIN — BUSPIRONE HYDROCHLORIDE 20 MG: 10 TABLET ORAL at 09:51

## 2020-05-18 RX ADMIN — LORAZEPAM 1 MG: 2 INJECTION INTRAMUSCULAR at 21:09

## 2020-05-18 RX ADMIN — AMITRIPTYLINE HYDROCHLORIDE 25 MG: 25 TABLET, FILM COATED ORAL at 20:52

## 2020-05-18 RX ADMIN — METOCLOPRAMIDE 10 MG: 5 INJECTION, SOLUTION INTRAMUSCULAR; INTRAVENOUS at 09:51

## 2020-05-18 RX ADMIN — MORPHINE SULFATE 2 MG: 2 INJECTION, SOLUTION INTRAMUSCULAR; INTRAVENOUS at 16:18

## 2020-05-18 RX ADMIN — PROPOFOL 50 MG: 10 INJECTION, EMULSION INTRAVENOUS at 18:38

## 2020-05-18 RX ADMIN — LORAZEPAM 1 MG: 2 INJECTION INTRAMUSCULAR at 16:44

## 2020-05-18 RX ADMIN — LIDOCAINE HYDROCHLORIDE 50 MG: 10 INJECTION, SOLUTION EPIDURAL; INFILTRATION; INTRACAUDAL; PERINEURAL at 18:30

## 2020-05-18 RX ADMIN — CLONAZEPAM 0.5 MG: 0.5 TABLET ORAL at 20:52

## 2020-05-18 RX ADMIN — METOCLOPRAMIDE 10 MG: 5 INJECTION, SOLUTION INTRAMUSCULAR; INTRAVENOUS at 04:50

## 2020-05-18 RX ADMIN — MORPHINE SULFATE 2 MG: 2 INJECTION, SOLUTION INTRAMUSCULAR; INTRAVENOUS at 00:41

## 2020-05-18 RX ADMIN — QUETIAPINE FUMARATE 100 MG: 100 TABLET ORAL at 20:52

## 2020-05-18 RX ADMIN — MORPHINE SULFATE 2 MG: 2 INJECTION, SOLUTION INTRAMUSCULAR; INTRAVENOUS at 21:09

## 2020-05-18 ASSESSMENT — PAIN SCALES - GENERAL
PAINLEVEL_OUTOF10: 8
PAINLEVEL_OUTOF10: 7
PAINLEVEL_OUTOF10: 0
PAINLEVEL_OUTOF10: 6
PAINLEVEL_OUTOF10: 0
PAINLEVEL_OUTOF10: 8
PAINLEVEL_OUTOF10: 8
PAINLEVEL_OUTOF10: 0
PAINLEVEL_OUTOF10: 8
PAINLEVEL_OUTOF10: 7
PAINLEVEL_OUTOF10: 8
PAINLEVEL_OUTOF10: 0

## 2020-05-18 ASSESSMENT — PULMONARY FUNCTION TESTS
PIF_VALUE: 0
PIF_VALUE: 2
PIF_VALUE: 0
PIF_VALUE: 1
PIF_VALUE: 0

## 2020-05-18 ASSESSMENT — PAIN DESCRIPTION - ORIENTATION
ORIENTATION: LOWER
ORIENTATION: UPPER;LOWER
ORIENTATION: LOWER

## 2020-05-18 ASSESSMENT — PAIN DESCRIPTION - PAIN TYPE
TYPE: CHRONIC PAIN
TYPE: CHRONIC PAIN

## 2020-05-18 ASSESSMENT — PAIN DESCRIPTION - DESCRIPTORS
DESCRIPTORS: ACHING
DESCRIPTORS: ACHING

## 2020-05-18 ASSESSMENT — PAIN DESCRIPTION - LOCATION
LOCATION: ABDOMEN

## 2020-05-18 ASSESSMENT — PAIN - FUNCTIONAL ASSESSMENT
PAIN_FUNCTIONAL_ASSESSMENT: PREVENTS OR INTERFERES WITH MANY ACTIVE NOT PASSIVE ACTIVITIES
PAIN_FUNCTIONAL_ASSESSMENT: PREVENTS OR INTERFERES SOME ACTIVE ACTIVITIES AND ADLS
PAIN_FUNCTIONAL_ASSESSMENT: 0-10

## 2020-05-18 ASSESSMENT — PAIN DESCRIPTION - PROGRESSION: CLINICAL_PROGRESSION: NOT CHANGED

## 2020-05-18 ASSESSMENT — PAIN DESCRIPTION - FREQUENCY
FREQUENCY: CONTINUOUS
FREQUENCY: CONTINUOUS

## 2020-05-18 ASSESSMENT — COPD QUESTIONNAIRES: CAT_SEVERITY: NO INTERVAL CHANGE

## 2020-05-18 ASSESSMENT — PAIN DESCRIPTION - ONSET
ONSET: ON-GOING
ONSET: ON-GOING

## 2020-05-18 NOTE — PROGRESS NOTES
emptying appropriately. CURRENT EVALUATION: 5/18/2020     Afebrile  VS stable RR 20-23  Remains anxious. Receiving medications to help with anxiety.     Tracheostomy capped. Tracheal secretions scant, whitish.      On TPN  X rays suspicious of at least partial or intermittent gastric outlet obstruction/paralysis of the pylorus. Botox injection planned. Patient to have EGD done this afternoon. PEG to gravity draining     Pertinent Labs and X rays reviewed: 5/18/2020       BUN: 24-->19-->21->31  Cr. :0.69-->1.0-->0.79->0.76-->0.81     WBC: 7.7-->13.5-->10.0->8.9-->7.8  Hb:8.8-->9.0->9.5-->10  Plat: 179-->268-->327->342-->339     Cultures:  Urine:  · 4-11-20: No growth  Blood:  · 4-11-20: No growth  · 5-3 x 2 no growth  · 5-14 x 2 no growth to date  Sputum :  ·    Wound:  ·    5-14 COVID negative      CXR 4-11-20: cardiomegaly, lungs clear  KUB 4-13-20; Normal  CT Abdomen 4-13-20: Few gallstones with mild GB distension. Otherwise abdomen normal.  Bilateral pulmonary basilar infiltrates. Abd XR 4-27-20: Dilated loops of bowel. Ileus  CXR 4-27-20: Vascular congestion, small Lt pleural effusion  CXR 5-6-20:  Cardiomegaly with Rt side infiltrate  CXR 5-8-20: Cardiomegaly with minimal RUL infiltrate   CXR 5-13-20: Moderate edema improved    Discussed with patient, RN. I have personally reviewed the past medical history, past surgical history, medications, social history, and family history, and I have updated the database accordingly.   Past Medical History:     Past Medical History:   Diagnosis Date    Anxiety     Arthritis     Back pain     Bronchitis     Caffeine use     8 coffee / day    Carpal tunnel syndrome     Cataracts, bilateral     Constipation     Depression     Diarrhea     GERD (gastroesophageal reflux disease)     H/O gastric ulcer     Hyperlipidemia     Hypertension     Mumps     MVP (mitral valve prolapse)     Dr. Jayy Luciano in May 2019    Recurrent UTI     Dr. China Evans Sinusitis     [facility] in place [facility])     Ulcerative esophagitis     Wellness examination     Dr. Amie Esquivel seen in Jan 2020       Past Surgical  History:     Past Surgical History:   Procedure Laterality Date    APPENDECTOMY      CARPAL TUNNEL RELEASE      COLONOSCOPY      CYSTOSCOPY      EGD  3/17/2020         EYE SURGERY      patricia IOL    GASTRIC FUNDOPLICATION N/A 7/32/1293    XI LAPAROSCOPIC ROBOTIC HIATAL HERNIA REPAIR, CHERYL FUNDOPLICATION performed by Vreonika Claros MD at [facility] N/A 3/27/2020    EGD PEG TUBE PLACEMENT performed by Veronika Claros MD at [address] CATH POWER PICC TRIPLE  3/4/2020         HIATAL HERNIA REPAIR  02/24/2020    LAPAROSCOPIC ROBOTIC HIATAL HERNIA REPAIR, CHERYL FUNDOPLICATION     HYSTERECTOMY      Abdominal    JOINT REPLACEMENT Right     right hip    OVARY REMOVAL      RHINOPLASTY      TONSILLECTOMY      TOTAL HIP ARTHROPLASTY      TOTAL NEPHRECTOMY      atrophic from infections, age 13   [place] [facility] TRACHEOSTOMY N/A 3/27/2020    **ADD ON **WANTS 10:00AM **TRACHEOTOMY performed by Veronika Claros MD at [address] N/A 4/2/2020    TRACHEOTOMY EXCHANGE performed by Veronika Claros MD at [address] ENDOSCOPY N/A 3/17/2020    BEDSIDE EGD ESOPHAGOGASTRODUODENOSCOPY (ICU) performed by Veronika Claros MD at [facility]       Medications:      metoprolol succinate  25 mg Oral Daily    [Held by provider] furosemide  20 mg Oral Daily    bisacodyl  10 mg Rectal Daily    clonazePAM  0.5 mg Oral BID    QUEtiapine  100 mg Oral Nightly    traZODone  100 mg Oral Nightly    tamsulosin  0.4 mg Oral Daily    metoclopramide  10 mg Intravenous Q6H    sodium chloride flush  10 mL Intravenous 2 times per day    enoxaparin  40 mg Subcutaneous Daily    pantoprazole  40 mg Intravenous Daily    And    sodium chloride (PF)  10 mL Intravenous Daily   

## 2020-05-18 NOTE — ANESTHESIA POSTPROCEDURE EVALUATION
Department of Anesthesiology  Postprocedure Note    Patient: Lindsey Mosher  MRN: [id number]  YOB: 1945  Date of evaluation: 5/18/2020  Time:  7:04 PM     Procedure Summary     Date:  05/18/20 Room / Location:  [address] [place] [address]    Anesthesia Start:  [address] Anesthesia Stop:  1854    Procedures:       EGD BIOPSY (N/A )      EGD DILATION BALLOON Diagnosis:  (RULE OUT ANASTOMATIC ULCER)    Surgeon:  Ami Smith MD Responsible Provider:  Jovana Mueller MD    Anesthesia Type:  MAC ASA Status:  4          Anesthesia Type: MAC    Alhaji Phase I: Alhaji Score: 10    Alhaji Phase II:      Last vitals: Reviewed and per EMR flowsheets.        Anesthesia Post Evaluation    Patient location during evaluation: PACU  Patient participation: complete - patient participated  Level of consciousness: awake  Pain score: 1  Airway patency: patent  Nausea & Vomiting: no nausea and no vomiting  Complications: no  Cardiovascular status: blood pressure returned to baseline and hemodynamically stable  Respiratory status: acceptable  Hydration status: euvolemic

## 2020-05-18 NOTE — PROGRESS NOTES
Surgery Progress Note            PATIENT NAME: Richard Waldrop     TODAY'S DATE: 5/18/2020, 7:01 AM    SUBJECTIVE:    Pt seen and examined. No acute events overnight. Patient is very anxious and worried about the EGD today. Denies abdominal pain, nausea or vomiting. Tolerated clears yesterday. OBJECTIVE:   VITALS:  /82   Pulse 92   Temp 98.9 °F (37.2 °C) (Oral)   Resp 17   Ht 5' 3\" (1.6 m)   Wt 189 lb 13.1 oz (86.1 kg)   SpO2 95%   BMI 33.62 kg/m²      INTAKE/OUTPUT:      Intake/Output Summary (Last 24 hours) at 5/18/2020 0701  Last data filed at 5/18/2020 5742  Gross per 24 hour   Intake 1395 ml   Output 3926 ml   Net -2531 ml       PHYSICAL EXAM  General Appearance:  awake, alert, oriented, in no acute distress  Skin:  Warm and dry   Head/face:  NCAT and sclera anicteric   Lungs:  Respirations even and unlabored   Heart:  Heart regular rate and rhythm  Abdomen:  Soft, non-distended, non-tender. PEG tube to gravity drainage, no erythema or drainage at tube site.    Extremities: no edema and no cyanosis          Data:  CBC with Differential:    Lab Results   Component Value Date    WBC 7.8 05/18/2020    RBC 3.71 05/18/2020    HGB 10.0 05/18/2020    HCT 34.8 05/18/2020     05/18/2020    MCV 93.8 05/18/2020    MCH 27.0 05/18/2020    MCHC 28.7 05/18/2020    RDW 18.4 05/18/2020    NRBC 1 05/12/2020    LYMPHOPCT 22 05/18/2020    MONOPCT 12 05/18/2020    BASOPCT 1 05/18/2020    MONOSABS 0.92 05/18/2020    LYMPHSABS 1.74 05/18/2020    EOSABS 0.14 05/18/2020    BASOSABS 0.04 05/18/2020    DIFFTYPE NOT REPORTED 05/18/2020     BMP:    Lab Results   Component Value Date     05/18/2020    K 4.0 05/18/2020     05/18/2020    CO2 25 05/18/2020    BUN 31 05/18/2020    LABALBU 2.9 05/14/2020    CREATININE 0.81 05/18/2020    CALCIUM 10.3 05/18/2020    GFRAA >60 05/18/2020    LABGLOM >60 05/18/2020    GLUCOSE 123 05/18/2020       Radiology Review:    EXAMINATION:   RIGHT UPPER QUADRANT ULTRASOUND

## 2020-05-18 NOTE — PROGRESS NOTES
TPN continuous to infuse at 51 ml/hr. Pt is VERY anxious,  Much moral support given. Pt much more relaxed after versed.

## 2020-05-18 NOTE — ANESTHESIA PRE PROCEDURE
(LEXAPRO) tablet 20 mg  20 mg Oral Daily Maikel Henry MD   20 mg at 05/18/20 0950    gabapentin (NEURONTIN) capsule 300 mg  300 mg Oral TID Maikel Henry MD   300 mg at 05/18/20 0951       Allergies:     Allergies   Allergen Reactions    Prednisone Anxiety    Compazine [Prochlorperazine Maleate]     Penicillin V Potassium     Penicillins Other (See Comments)     shock       Problem List:    Patient Active Problem List   Diagnosis Code    Frequency of urination R35.0    Severe sepsis ([address] A41.9, R65.20    Back pain M54.9    GERD (gastroesophageal reflux disease) K21.9    MVP (mitral valve prolapse) I34.1    Depression F32.9    Anxiety F41.9    Urine retention R33.9    Acute kidney injury ([address] N17.9    Esophageal dysphagia R13.10    Paraesophageal hernia K44.9    History of repair of hiatal hernia Z98.890, Z87.19    Aspiration pneumonia ([facility]) J69.0    Respiratory failure ([facility]) J96.90    Centrilobular emphysema ([address] J43.2    Atelectasis J98.11    Pleural effusion J90    Esophageal dilatation K22.8    Upper GI bleeding K92.2    Acute on chronic blood loss anemia D62    Shock ([address] R57.9    Fever R50.9    Acute postoperative respiratory insufficiency J95.89    Critical illness polyneuropathy ([facility]) G62.81    Gastric outlet obstruction K31.1    Recurrent UTI N39.0    Tracheostomy in place ([facility] Z93.0    H/O gastric ulcer Z87.19    Hyperlipidemia E78.5    Hypertension I10    Tobacco abuse Z72.0    Chronic respiratory failure with hypoxia ([facility]) J96.11    Status post insertion of percutaneous endoscopic gastrostomy (PEG) tube ([facility]) Z93.1    S/P Nissen fundoplication (without gastrostomy tube) procedure Z98.890    Paralytic ileus ([facility] K56.0    Elevated liver enzymes R74.8       Past Medical History:        Diagnosis Date    Anxiety     Arthritis     Back pain     Bronchitis     Caffeine use     8 coffee / day    Carpal tunnel syndrome     Cataracts, bilateral     Constipation     Depression     Diarrhea     GERD (gastroesophageal reflux disease)     H/O gastric ulcer     Hyperlipidemia     Hypertension     Mumps     MVP (mitral valve prolapse)     Dr. Xavier Bowne in May 2019    Recurrent UTI     Dr. Farheen Jaimes    Sinusitis     Tr[facility] in [facility])     Ulcerative esophagitis     Wellness examination     Dr. Chacho Osman seen in Jan 2020       Past Surgical History:        Procedure Laterality Date    APPENDECTOMY      CARPAL TUNNEL RELEASE      COLONOSCOPY      CYSTOSCOPY      EGD  3/17/2020         EYE SURGERY      patricia IOL    GASTRIC FUNDOPLICATION N/A 0/99/1553    XI LAPAROSCOPIC ROBOTIC HIATAL HERNIA REPAIR, CHERYL FUNDOPLICATION performed by Klever Latif MD at [facility] N/A 3/27/2020    EGD PEG TUBE PLACEMENT performed by Klever Latif MD at [address] CATH POWER PICC TRIPLE  3/4/2020         HIATAL HERNIA REPAIR  02/24/2020    LAPAROSCOPIC ROBOTIC HIATAL HERNIA REPAIR, CHERYL FUNDOPLICATION     HYSTERECTOMY      Abdominal    JOINT REPLACEMENT Right     right hip    OVARY REMOVAL      RHINOPLASTY      TONSILLECTOMY      TOTAL HIP ARTHROPLASTY      TOTAL NEPHRECTOMY      atrophic from infections, age 13   [facility] TRACHEOSTOMY N/A 3/27/2020    **ADD ON **WANTS 10:00AM **TRACHEOTOMY performed by Klever Latif MD at [address] N/A 4/2/2020    TRACHEOTOMY EXCHANGE performed by Klever Latif MD at [address] ENDOSCOPY N/A 3/17/2020    BEDSIDE EGD ESOPHAGOGASTRODUODENOSCOPY (ICU) performed by Klever Latif MD at [facility]       Social History:    Social History     Tobacco Use    Smoking status: Current Every Day Smoker     Packs/day: 1.00     Years: 50.00     Pack years: 50.00     Types: Cigarettes    Smokeless tobacco: Never Used   Substance Use Topics    Alcohol use:  No                                Ready to quit: Not Answered  Counseling given: Not Answered      Vital Signs (Current): There were no vitals filed for this visit.                                            BP Readings from Last 3 Encounters:   05/18/20 138/60   04/06/20 (!) 148/67   02/29/20 125/82       NPO Status:                                                                                 BMI:   Wt Readings from Last 3 Encounters:   05/17/20 189 lb 13.1 oz (86.1 kg)   04/06/20 179 lb 0.2 oz (81.2 kg)   02/29/20 186 lb 1.1 oz (84.4 kg)     There is no height or weight on file to calculate BMI.    CBC:   Lab Results   Component Value Date    WBC 7.8 05/18/2020    RBC 3.71 05/18/2020    HGB 10.0 05/18/2020    HCT 34.8 05/18/2020    MCV 93.8 05/18/2020    RDW 18.4 05/18/2020     05/18/2020       CMP:   Lab Results   Component Value Date     05/18/2020    K 4.0 05/18/2020     05/18/2020    CO2 25 05/18/2020    BUN 31 05/18/2020    CREATININE 0.81 05/18/2020    GFRAA >60 05/18/2020    LABGLOM >60 05/18/2020    GLUCOSE 123 05/18/2020    PROT 6.9 05/14/2020    CALCIUM 10.3 05/18/2020    BILITOT 0.20 05/14/2020    ALKPHOS 182 05/14/2020    AST 27 05/14/2020    ALT 36 05/14/2020       POC Tests:   Recent Labs     05/18/20  1325   POCGLU 118*       Coags:   Lab Results   Component Value Date    PROTIME 11.1 05/13/2020    INR 1.1 05/13/2020    APTT 26.2 03/27/2020       HCG (If Applicable): No results found for: PREGTESTUR, PREGSERUM, HCG, HCGQUANT     ABGs: No results found for: PHART, PO2ART, DEE0AKK, NOB1GIK, BEART, R7GTINXM     Type & Screen (If Applicable):  No results found for: JUSTIN [facility]    Anesthesia Evaluation  Patient summary reviewed and Nursing notes reviewed no history of anesthetic complications:   Airway: Mallampati: II  TM distance: >3 FB   Neck ROM: full  Comment: Trach in place  Mouth opening: > = 3 FB Dental:    (+) partials  Comment: Very poor dentition     Pulmonary:Negative Pulmonary ROS and normal exam    (+) pneumonia: no

## 2020-05-18 NOTE — CARE COORDINATION
Transitional Planning  Spoke to Travis Jones, confirmed patient is a bed hold at [facility]. Aware of plan for EGD today. Notify when patient is ready to return.

## 2020-05-18 NOTE — PROGRESS NOTES
Nutrition Assessment (Parenteral Nutrition)    Type and Reason for Visit: Reassess    Nutrition Recommendations:   -Continue NPO status  -Restart diet as able   -Recommend ensure enlive/ensure clear supplements BID as able   -Recommend increasing PN 2-in-1 custom central @ 60 mL/hr x 24 hrs (1440 mLs) + 300 gms dextrose + 75 gms AA ~> 1320 kcals, 75 gms protein -- With D5 @ 20 mL/hr ~> 1402 kcals/d  -Will continue monitor TPN, labs and start of diet      Nutrition Assessment: Pt declining from a nutritional standpoint aeb pt is now NPO for EGD today. TPN continues. Pt tolerated clear liquids ok yesterday. Will monitor for restart of diet and add supplements as able. Malnutrition Assessment:  · Malnutrition Status: At risk for malnutrition  · Context: Acute illness or injury  · Findings of the 6 clinical characteristics of malnutrition (Minimum of 2 out of 6 clinical characteristics is required to make the diagnosis of moderate or severe Protein Calorie Malnutrition based on AND/ASPEN Guidelines):  1. Energy Intake-Greater than 75% of estimated energy requirement, Greater than or equal to 1 month(w/ TPN)    2. Weight Loss-No significant weight loss,    3. Fat Loss-No significant subcutaneous fat loss,    4. Muscle Loss-No significant muscle mass loss,    5. Fluid Accumulation-No significant fluid accumulation,    6.   Strength-Not measured    Nutrition Risk Level: High    Nutrient Needs:  · Estimated Daily Total Kcal: 1.2-1.3 ~> 5915-8688 kcals/d   · Estimated Daily Protein (g): 1.2-1.4 gm/kg ~> 60-75 gms/d     Nutrition Diagnosis:   · Problem: Inadequate oral intake  · Etiology: related to Alteration in GI function     Signs and symptoms:  as evidenced by NPO status due to medical condition, Nutrition support - PN    Objective Information:  · Nutrition-Focused Physical Findings: Meds/labs reviewed   · Wound Type: None  · Current Nutrition Therapies:  · Oral Diet Orders: NPO   · Parenteral Nutrition

## 2020-05-18 NOTE — DISCHARGE INSTR - COC
Test Resulted          Nurse Assessment:  Last Vital Signs: BP (!) 140/70   Pulse 93   Temp 97.5 °F (36.4 °C) (Oral)   Resp 16   Ht 5' 3\" (1.6 m)   Wt 189 lb 13.1 oz (86.1 kg)   SpO2 96%   BMI 33.62 kg/m²     Last documented pain score (0-10 scale): Pain Level: 8  Last Weight:   Wt Readings from Last 1 Encounters:   05/17/20 189 lb 13.1 oz (86.1 kg)     Mental Status:  oriented and alert    IV Access:  PICC line Rt upper arm inserted 05/02/2020    Nursing Mobility/ADLs:  Walking   Assisted  Transfer  Assisted  Bathing  Assisted  Dressing  Assisted  Toileting  Dependent  Feeding  Assisted  Med Admin  Dependent  Med Delivery   crushed and prefers mixed with pudding    Wound Care Documentation and Therapy:  Wound 04/01/20 Neck Mid;Distal non-healing area of tracheostomy incision (Active)   Number of days: 47        Elimination:  Continence:   · Bowel: No  · Bladder: No  Urinary Catheter: Indication for Use of Catheter: Need for fluid management in critally ill patients in a critical care setting not able to be managed by other means such as BSC with hat, bedpan, urinal, condom catheter, or short term intermittent urethral catheterization   Colostomy/Ileostomy/Ileal Conduit: No       Date of Last BM: 6/1/2020    Intake/Output Summary (Last 24 hours) at 5/18/2020 1227  Last data filed at 5/18/2020 0517  Gross per 24 hour   Intake 1095 ml   Output 3925 ml   Net -2830 ml     I/O last 3 completed shifts: In: 9377 [P.O.:300; I.V.:495]  Out: 2512 [Urine:1000; Emesis/NG output:2925; Stool:1]    Safety Concerns:      At Risk for Falls    Impairments/Disabilities:          Nutrition Therapy:  Current Nutrition Therapy:   - Oral Diet:  Dysphagia 1 pureed  - Tube Feedings:  10 ml/hr over 24 hrs per day  - Parenteral Nutrition:  60 ml over 24 hrs per day (see PN orders for content)    Routes of Feeding: Oral, Gastrostomy Tube and Parenteral Nutrition (PN)  Liquids: Honey Thick Liquids  Daily Fluid Restriction: no  Last

## 2020-05-19 LAB
ABSOLUTE EOS #: 0.14 K/UL (ref 0–0.44)
ABSOLUTE IMMATURE GRANULOCYTE: 0.12 K/UL (ref 0–0.3)
ABSOLUTE LYMPH #: 1.55 K/UL (ref 1.1–3.7)
ABSOLUTE MONO #: 0.88 K/UL (ref 0.1–1.2)
BASOPHILS # BLD: 1 % (ref 0–2)
BASOPHILS ABSOLUTE: 0.04 K/UL (ref 0–0.2)
DIFFERENTIAL TYPE: ABNORMAL
EOSINOPHILS RELATIVE PERCENT: 2 % (ref 1–4)
GLUCOSE BLD-MCNC: 113 MG/DL (ref 65–105)
GLUCOSE BLD-MCNC: 124 MG/DL (ref 65–105)
GLUCOSE BLD-MCNC: 156 MG/DL (ref 65–105)
HCT VFR BLD CALC: 34.2 % (ref 36.3–47.1)
HEMOGLOBIN: 9.9 G/DL (ref 11.9–15.1)
IMMATURE GRANULOCYTES: 2 %
LYMPHOCYTES # BLD: 19 % (ref 24–43)
MCH RBC QN AUTO: 27.3 PG (ref 25.2–33.5)
MCHC RBC AUTO-ENTMCNC: 28.9 G/DL (ref 28.4–34.8)
MCV RBC AUTO: 94.5 FL (ref 82.6–102.9)
MONOCYTES # BLD: 11 % (ref 3–12)
NRBC AUTOMATED: 0 PER 100 WBC
PDW BLD-RTO: 18.5 % (ref 11.8–14.4)
PLATELET # BLD: 320 K/UL (ref 138–453)
PLATELET ESTIMATE: ABNORMAL
PMV BLD AUTO: 10.9 FL (ref 8.1–13.5)
RBC # BLD: 3.62 M/UL (ref 3.95–5.11)
RBC # BLD: ABNORMAL 10*6/UL
SEG NEUTROPHILS: 67 % (ref 36–65)
SEGMENTED NEUTROPHILS ABSOLUTE COUNT: 5.43 K/UL (ref 1.5–8.1)
SMOOTH MUSCLE ANTIBODY: 15 UNITS (ref 0–19)
WBC # BLD: 8.2 K/UL (ref 3.5–11.3)
WBC # BLD: ABNORMAL 10*3/UL

## 2020-05-19 PROCEDURE — 85025 COMPLETE CBC W/AUTO DIFF WBC: CPT

## 2020-05-19 PROCEDURE — 2580000003 HC RX 258: Performed by: NURSE PRACTITIONER

## 2020-05-19 PROCEDURE — 6360000002 HC RX W HCPCS: Performed by: NURSE PRACTITIONER

## 2020-05-19 PROCEDURE — 6370000000 HC RX 637 (ALT 250 FOR IP): Performed by: NURSE PRACTITIONER

## 2020-05-19 PROCEDURE — 99232 SBSQ HOSP IP/OBS MODERATE 35: CPT | Performed by: INTERNAL MEDICINE

## 2020-05-19 PROCEDURE — 6370000000 HC RX 637 (ALT 250 FOR IP): Performed by: FAMILY MEDICINE

## 2020-05-19 PROCEDURE — 1200000000 HC SEMI PRIVATE

## 2020-05-19 PROCEDURE — 2580000003 HC RX 258: Performed by: INTERNAL MEDICINE

## 2020-05-19 PROCEDURE — 36415 COLL VENOUS BLD VENIPUNCTURE: CPT

## 2020-05-19 PROCEDURE — 6370000000 HC RX 637 (ALT 250 FOR IP): Performed by: INTERNAL MEDICINE

## 2020-05-19 PROCEDURE — 82947 ASSAY GLUCOSE BLOOD QUANT: CPT

## 2020-05-19 PROCEDURE — 6370000000 HC RX 637 (ALT 250 FOR IP): Performed by: ANESTHESIOLOGY

## 2020-05-19 PROCEDURE — C9113 INJ PANTOPRAZOLE SODIUM, VIA: HCPCS | Performed by: INTERNAL MEDICINE

## 2020-05-19 PROCEDURE — 99232 SBSQ HOSP IP/OBS MODERATE 35: CPT | Performed by: HOSPITALIST

## 2020-05-19 PROCEDURE — 2500000003 HC RX 250 WO HCPCS: Performed by: FAMILY MEDICINE

## 2020-05-19 PROCEDURE — 6360000002 HC RX W HCPCS: Performed by: INTERNAL MEDICINE

## 2020-05-19 PROCEDURE — 6370000000 HC RX 637 (ALT 250 FOR IP): Performed by: STUDENT IN AN ORGANIZED HEALTH CARE EDUCATION/TRAINING PROGRAM

## 2020-05-19 RX ADMIN — SUCRALFATE 1 G: 1 TABLET ORAL at 05:06

## 2020-05-19 RX ADMIN — CLONAZEPAM 0.5 MG: 0.5 TABLET ORAL at 20:40

## 2020-05-19 RX ADMIN — SODIUM CHLORIDE, PRESERVATIVE FREE 10 ML: 5 INJECTION INTRAVENOUS at 10:28

## 2020-05-19 RX ADMIN — LORAZEPAM 1 MG: 2 INJECTION INTRAMUSCULAR at 01:31

## 2020-05-19 RX ADMIN — METOCLOPRAMIDE 10 MG: 5 INJECTION, SOLUTION INTRAMUSCULAR; INTRAVENOUS at 03:59

## 2020-05-19 RX ADMIN — LORAZEPAM 1 MG: 2 INJECTION INTRAMUSCULAR at 05:35

## 2020-05-19 RX ADMIN — TRAZODONE HYDROCHLORIDE 100 MG: 100 TABLET ORAL at 21:50

## 2020-05-19 RX ADMIN — CLONAZEPAM 0.5 MG: 0.5 TABLET ORAL at 10:23

## 2020-05-19 RX ADMIN — SODIUM CHLORIDE, PRESERVATIVE FREE 10 ML: 5 INJECTION INTRAVENOUS at 20:44

## 2020-05-19 RX ADMIN — MORPHINE SULFATE 2 MG: 2 INJECTION, SOLUTION INTRAMUSCULAR; INTRAVENOUS at 07:01

## 2020-05-19 RX ADMIN — SUCRALFATE 1 G: 1 TABLET ORAL at 17:16

## 2020-05-19 RX ADMIN — POTASSIUM CHLORIDE: 2 INJECTION, SOLUTION, CONCENTRATE INTRAVENOUS at 17:45

## 2020-05-19 RX ADMIN — AMITRIPTYLINE HYDROCHLORIDE 25 MG: 25 TABLET, FILM COATED ORAL at 14:02

## 2020-05-19 RX ADMIN — PANTOPRAZOLE SODIUM 40 MG: 40 INJECTION, POWDER, LYOPHILIZED, FOR SOLUTION INTRAVENOUS at 21:51

## 2020-05-19 RX ADMIN — MORPHINE SULFATE 2 MG: 2 INJECTION, SOLUTION INTRAMUSCULAR; INTRAVENOUS at 12:35

## 2020-05-19 RX ADMIN — ESCITALOPRAM OXALATE 20 MG: 10 TABLET ORAL at 10:23

## 2020-05-19 RX ADMIN — ENOXAPARIN SODIUM 40 MG: 40 INJECTION SUBCUTANEOUS at 10:24

## 2020-05-19 RX ADMIN — MORPHINE SULFATE 2 MG: 2 INJECTION, SOLUTION INTRAMUSCULAR; INTRAVENOUS at 03:58

## 2020-05-19 RX ADMIN — BUSPIRONE HYDROCHLORIDE 20 MG: 10 TABLET ORAL at 20:40

## 2020-05-19 RX ADMIN — METOCLOPRAMIDE 10 MG: 5 INJECTION, SOLUTION INTRAMUSCULAR; INTRAVENOUS at 17:16

## 2020-05-19 RX ADMIN — BISACODYL 10 MG: 10 SUPPOSITORY RECTAL at 10:24

## 2020-05-19 RX ADMIN — BUSPIRONE HYDROCHLORIDE 20 MG: 10 TABLET ORAL at 10:23

## 2020-05-19 RX ADMIN — AMITRIPTYLINE HYDROCHLORIDE 25 MG: 25 TABLET, FILM COATED ORAL at 10:24

## 2020-05-19 RX ADMIN — METOCLOPRAMIDE 10 MG: 5 INJECTION, SOLUTION INTRAMUSCULAR; INTRAVENOUS at 20:43

## 2020-05-19 RX ADMIN — GABAPENTIN 300 MG: 300 CAPSULE ORAL at 10:24

## 2020-05-19 RX ADMIN — OXYCODONE HYDROCHLORIDE AND ACETAMINOPHEN 1 TABLET: 5; 325 TABLET ORAL at 14:02

## 2020-05-19 RX ADMIN — LORAZEPAM 1 MG: 2 INJECTION INTRAMUSCULAR at 09:15

## 2020-05-19 RX ADMIN — GABAPENTIN 300 MG: 300 CAPSULE ORAL at 20:40

## 2020-05-19 RX ADMIN — LORAZEPAM 1 MG: 2 INJECTION INTRAMUSCULAR at 13:55

## 2020-05-19 RX ADMIN — Medication 10 ML: at 10:28

## 2020-05-19 RX ADMIN — Medication 10 ML: at 21:51

## 2020-05-19 RX ADMIN — LORAZEPAM 1 MG: 2 INJECTION INTRAMUSCULAR at 18:27

## 2020-05-19 RX ADMIN — METOCLOPRAMIDE 10 MG: 5 INJECTION, SOLUTION INTRAMUSCULAR; INTRAVENOUS at 10:24

## 2020-05-19 RX ADMIN — AMITRIPTYLINE HYDROCHLORIDE 25 MG: 25 TABLET, FILM COATED ORAL at 20:40

## 2020-05-19 RX ADMIN — BUSPIRONE HYDROCHLORIDE 20 MG: 10 TABLET ORAL at 14:02

## 2020-05-19 RX ADMIN — GABAPENTIN 300 MG: 300 CAPSULE ORAL at 14:02

## 2020-05-19 RX ADMIN — MORPHINE SULFATE 2 MG: 2 INJECTION, SOLUTION INTRAMUSCULAR; INTRAVENOUS at 15:40

## 2020-05-19 RX ADMIN — PANTOPRAZOLE SODIUM 40 MG: 40 INJECTION, POWDER, LYOPHILIZED, FOR SOLUTION INTRAVENOUS at 10:24

## 2020-05-19 RX ADMIN — QUETIAPINE FUMARATE 100 MG: 100 TABLET ORAL at 20:40

## 2020-05-19 RX ADMIN — SUCRALFATE 1 G: 1 TABLET ORAL at 12:14

## 2020-05-19 ASSESSMENT — PAIN SCALES - GENERAL
PAINLEVEL_OUTOF10: 8
PAINLEVEL_OUTOF10: 7
PAINLEVEL_OUTOF10: 8
PAINLEVEL_OUTOF10: 0
PAINLEVEL_OUTOF10: 8

## 2020-05-19 NOTE — PROGRESS NOTES
Nutrition Assessment (Parenteral Nutrition)    Type and Reason for Visit: Reassess    Nutrition Recommendations:   -Continue Clear Liquid diet, advance as tolerated  -Recommend ensure clear supplements TID  -Continue PN 2-in-1 custom central @ 60 mL/hr x 24 hrs (1440 mLs) + 300 gms dextrose + 75 gms AA ~> 1320 kcals, 75 gms protein -- With D5 @ 20 mL/hr ~> 1402 kcals/d  -Will continue monitor TPN, po intake and labs      Nutrition Assessment: Pt improving from a nutritional standpoint aeb pt has been restarted on a CL diet late this morning. TPN continues. Pt s/p EGD. Will add supplements to compliment po intake and continue to monitor. Malnutrition Assessment:  · Malnutrition Status: At risk for malnutrition  · Context: Acute illness or injury  · Findings of the 6 clinical characteristics of malnutrition (Minimum of 2 out of 6 clinical characteristics is required to make the diagnosis of moderate or severe Protein Calorie Malnutrition based on AND/ASPEN Guidelines):  1. Energy Intake-Greater than 75% of estimated energy requirement, Greater than or equal to 1 month(w/ TPN)    2. Weight Loss-No significant weight loss,    3. Fat Loss-No significant subcutaneous fat loss,    4. Muscle Loss-No significant muscle mass loss,    5. Fluid Accumulation-No significant fluid accumulation,    6.   Strength-Not measured    Nutrition Risk Level: High    Nutrient Needs:  · Estimated Daily Total Kcal: 1.2-1.3 ~> 8332-9301 kcals/d   · Estimated Daily Protein (g): 1.2-1.4 gm/kg ~> 60-75 gms/d     Nutrition Diagnosis:   · Problem: Inadequate oral intake  · Etiology: related to Alteration in GI function     Signs and symptoms:  as evidenced by Nutrition support - PN(Start of CL diet)    Objective Information:  · Nutrition-Focused Physical Findings: Meds/labs reviewed   · Wound Type: Open Wounds  · Current Nutrition Therapies:  · Oral Diet Orders: Clear Liquid   · Oral Diet intake: Unable to assess(started late this

## 2020-05-19 NOTE — PROGRESS NOTES
fundoplication last month on 02/24/2020 and postoperatively she developed hypoxia. CT chest done on 02/26/2020 showed bibasilar atelectasis/consolidation and patchy area of infiltrate in the right upper lobe. Patient was discharged on home oxygen on 2-28.     She presented back to [facility] ER on 2-29 with worsening shortness of breath, dyspnea as well as an episode of dark red emesis. Her SaO2 was 60 % in the ED. There was concern of aspiration. CTA chest done was negative for PE, showed bilateral pleural effusions and severe esophageal dilatation, surgery was consulted. Patient was transferred to [facility] for further management.     Patient was started on noninvasive ventilation and then intubated and admitted to the ICU. She improved, was extubated on 3-7 to high flow O2. Pt was transferred out of the ICU on 3-13.     On 3-14 pts hgb dropped to 6.3 and she developed coffee-ground then bright red emesis, and was again transferred back to medical ICU.     General surgery performed endoscopy on 3/17 which showed an ulcer at the GE junction and mild gastritis with no active bleeding. After the procedure she developed acute respiratory distress and a fever of 39.8. She was emergently intubated and started on Meropenem and Vancomycin.      ID is consulted for antibiotic management. Patient was continued on meropenem. Vancomycin D/C     S/P trach and PEG placement 3/27     Flat plate of abdomen 6-19-33 with nonobstructive gas pattern with retained enteric contrast in the colon  On TPN. Patient could not tolerate tube feeds. Has superficial venous clot Rt Mid forearm to antecubital      Tracheal exchange in OR done on 4-2-20. Patient transferred to [facility] Counts include [facility] beds at the [facility] on 4-6-20. Transferred back to [facility] on 5-12-20 because of persistent vomiting, abdominal pain. Concern for possible SBO. Pt NPO on TPN.    5-15 Passed swallow study to Dys III w/thickened liquids  Per surgery, pt for esophagram to evaluate the hiatal hernia repair. If the esophagram appears normal--> we will plan for a PEGogram and evaluate whether the stomach is emptying appropriately. CURRENT EVALUATION: 5/19/2020     Afebrile  VS stable RR 20-23  Remains anxious. Receiving medications to help with anxiety.     Tracheostomy capped. Tracheal secretions scant, whitish.      On TPN  X rays suspicious of at least partial or intermittent gastric outlet obstruction/paralysis of the pylorus. Botox injection planned. Severe esophagitis, nodular gastritis, pyloric stenosis by EGD on 5-18-20. S/P pyloric balloon dilatation. PEG to gravity draining     Pertinent Labs and X rays reviewed: 5/19/2020       BUN: 24-->19-->21->31  Cr. :0.69-->1.0-->0.79->0.76-->0.81     WBC: 7.7-->13.5-->10.0->8.9-->7.8  Hb:8.8-->9.0->9.5-->10  Plat: 179-->268-->327->342-->339     Cultures:  Urine:  · 4-11-20: No growth  Blood:  · 4-11-20: No growth  · 5-3 x 2 no growth  · 5-14 x 2 no growth to date  Sputum :  ·    Wound:  ·    5-14 COVID negative      CXR 4-11-20: cardiomegaly, lungs clear  KUB 4-13-20; Normal  CT Abdomen 4-13-20: Few gallstones with mild GB distension. Otherwise abdomen normal.  Bilateral pulmonary basilar infiltrates. Abd XR 4-27-20: Dilated loops of bowel. Ileus  CXR 4-27-20: Vascular congestion, small Lt pleural effusion  CXR 5-6-20:  Cardiomegaly with Rt side infiltrate  CXR 5-8-20: Cardiomegaly with minimal RUL infiltrate   CXR 5-13-20: Moderate edema improved    Discussed with patient, RN. I have personally reviewed the past medical history, past surgical history, medications, social history, and family history, and I have updated the database accordingly.   Past Medical History:     Past Medical History:   Diagnosis Date    Anxiety     Arthritis     Back pain     Bronchitis     Caffeine use     8 coffee / day    Carpal tunnel syndrome     Cataracts, bilateral     Constipation     Depression     Diarrhea     GERD (gastroesophageal reflux disease) [place] H/O gastric ulcer     Hyperlipidemia     Hypertension     Mumps     MVP (mitral valve prolapse)     Dr. Lencho Villa in May 2019    Recurrent UTI     Dr. Lupe Damon    Sinusitis     Tracheostomy in place [facility])     Ulcerative esophagitis     Wellness examination     Dr. Tee Rowland seen in Jan 2020       Past Surgical  History:     Past Surgical History:   Procedure Laterality Date    APPENDECTOMY      CARPAL TUNNEL RELEASE      COLONOSCOPY      CYSTOSCOPY      EGD  3/17/2020         EYE SURGERY      patricia IOL    GASTRIC FUNDOPLICATION N/A 9/38/8895    XI LAPAROSCOPIC ROBOTIC HIATAL HERNIA REPAIR, CHERYL FUNDOPLICATION performed by Macario Wagner MD at [facility] N/A 3/27/2020    EGD PEG TUBE PLACEMENT performed by Macario Wagner MD at [address] CATH POWER PICC TRIPLE  3/4/2020         HIATAL HERNIA REPAIR  02/24/2020    LAPAROSCOPIC ROBOTIC HIATAL HERNIA REPAIR, CHERYL FUNDOPLICATION     HYSTERECTOMY      Abdominal    JOINT REPLACEMENT Right     right hip    OVARY REMOVAL      RHINOPLASTY      TONSILLECTOMY      TOTAL HIP ARTHROPLASTY      TOTAL NEPHRECTOMY      atrophic from infections, age 13   [place] TRACHEOSTOMY N/A 3/27/2020    **ADD ON **WANTS 10:00AM **TRACHEOTOMY performed by Macario Wagner MD at [address] N/A 4/2/2020    TRACHEOTOMY EXCHANGE performed by Macario Wagner MD at [address] [facility] 3/17/2020    BEDSIDE EGD ESOPHAGOGASTRODUODENOSCOPY (ICU) performed by Macario Wagner MD at [address] [place] [address]  05/18/2020    with balloon dialation and biopsy    UPPER GASTROINTESTINAL ENDOSCOPY N/A 5/18/2020    EGD BIOPSY performed by Huey Cruz MD at [address] [place] [address]  5/18/2020    EGD DILATION BALLOON performed by Huey Cruz MD at [facility]       Medications:      pantoprazole  40 mg Intravenous abused: Not on file     Forced sexual activity: Not on file   Other Topics Concern    Not on file   Social History Narrative    Not on file       Family History:     Family History   Problem Relation Age of Onset    Cancer Mother         uterine    Heart Attack Mother     Heart Attack Father     Other Maternal Grandfather         foot gangrene    Other Paternal Grandmother         bleeding ulcers    Other Paternal Grandfather         lung cancer        Allergies:   Prednisone; Compazine [prochlorperazine maleate]; Penicillin v potassium; and Penicillins     Review of Systems:   Constitutional: No fevers or chills. No systemic complaints  Head: No headaches  Eyes: No double vision or blurry vision. No conjunctival inflammation. ENT: No sore throat or runny nose. . No hearing loss, tinnitus or vertigo. Cardiovascular: No chest pain or palpitations. No shortness of breath. No HOLLAND  Lung: No shortness of breath or cough. No sputum production  Abdomen: No nausea, vomiting, diarrhea, or abdominal pain. Winda He No cramps. Genitourinary: No increased urinary frequency, or dysuria. No hematuria. No suprapubic or CVA pain  Musculoskeletal: No muscle aches or pains. No joint effusions, swelling or deformities  Hematologic: No bleeding or bruising. Neurologic: No headache, weakness, numbness, or tingling. Integument: No rash, no ulcers. Psychiatric: No depression. Endocrine: No polyuria, no polydipsia, no polyphagia. Physical Examination :     Patient Vitals for the past 8 hrs:   BP Temp Temp src Pulse Resp SpO2   05/19/20 0902 -- -- -- -- -- 95 %   05/19/20 0837 137/66 99 °F (37.2 °C) Oral 100 16 95 %     General Appearance: Awake, alert, and in no apparent distress  Head:  Normocephalic, no trauma  Eyes: Pupils equal, round, reactive to light and accommodation; extraocular movements intact; sclera anicteric; conjunctivae pink. No embolic phenomena. ENT: Oropharynx clear, without erythema, exudate, or thrush.  No are no abnormal   masses or calcifications. The osseous structures and soft tissues are unremarkable.     IMPRESSION:     No acute finding. PEG tube is in place.        Electronically Signed By: Dr. Rashida Narvaez M.D. 04/13/2020 9:45:23 EDT    Medical Decision Ibpopr-Trwybyug-Gcmsq:       Medical Decision Making-Other:     Note:  · Labs, medications, radiologic studies were reviewed with personal review of films  · Large amounts of data were reviewed  · Discussed with nursing Staff, Discharge planner  · Infection Control and Prevention measures reviewed  · All prior entries were reviewed  · Administer medications as ordered  · Prognosis: Guarded  · Discharge planning reviewed  · Follow up as outpatient. Thank you for allowing us to participate in the care of this patient. Please call with questions.     Lilly Jovel MD  Pager: [phone] - Office: [phone]

## 2020-05-19 NOTE — PROGRESS NOTES
[facility]   Progress Note    Ada Barba is a 76 y.o. female patient. Hospitalization Day:7      Chief consult reason: For Botox injections to pylorus    Subjective:  Pt seen and examined. Pt requesting diet. Pt is s/p EGD yesterday per Dr. Reza Joseph that revealed severe Esophagitis, nodular gastritis-bx taken, stenotic pylorus that was dilated. 2020 EGD per Dr. Reza Joseph:  Findings:     Retropharyngeal area was grossly normal appearing     Esophagus: abnormal: Severe esophagitis with geographic appearance to mucosa, with spared araes and erythematous inflamed areas, the entire lower 1/3 of the esophagus is looking like that .     Stomach:  Severe nodular type  gastritis, bxs taken   PEG in place      Pyloric orifice was tight at first and was difficult to pass scope through but after that it was wide open, but because of the barium study result I did dilate the pyloric orifice with 15-16-18 mm balloon   Now it is wide open see image      Duodenum:     Descending: normal    Bulb: normal     The scope was removed and the patient tolerated the procedure well.      Recommendations/Plan:   1. Start TF consciously   2. High dose PPI and Carafate  3. treat H pylori if positive   4. If tolerate TF will try liquid diet      Electronically signed by Rosa Huerta MD  on 2020 at 6:51 PM     VITALS:  /66   Pulse 100   Temp 99 °F (37.2 °C) (Oral)   Resp 16   Ht 5' 3\" (1.6 m)   Wt 182 lb (82.6 kg)   SpO2 95%   BMI 32.24 kg/m²   TEMPERATURE:  Current - Temp: 99 °F (37.2 °C);  Max - Temp  Av.8 °F (36.6 °C)  Min: 97 °F (36.1 °C)  Max: 99 °F (37.2 °C)    Physical Assessment:  General appearance:  alert, cooperative and moderate emotional distress  Mental Status:  oriented to person, place and time and normal affect  Lungs:  clear to auscultation bilaterally, normal effort  Heart:  regular rate and rhythm, no murmur  Abdomen:  soft, nontender, nondistended, normal bowel sounds, no Problems:    * No resolved hospital problems. *       GI Assessment and plan:  1. Severe esophagitis, gastritis. BX taken-not resulted yet  -Rec Protonix 40 mg IV BID will in house-then can transition to po   -Rec Carafate 1 gr QID-crush to make slurry for administration  -Diet per GS    2. Abnormal LFT's. MRI reviewed-concern for  Possible CBD obstruction  -Will monitor labs in am      Thank you for allowing me to participate in the care of your patient. Please feel free to contact me with any questions or concerns. [place] [address] [facility]  [phone]  Attending Physician Statement  I have discussed the care of Nikky Lara and   I have examined the patient myselft independently, and taken ros and hpi , including pertinent history and exam findings,  with the author of this note . I have reviewed the key elements of all parts of the encounter with the nurse practitioner/resident.     I agree with the assessment, plan and orders as documented by the above health care provider       MRI result was noted   We will repeat the LFTs in the morning and make a decision on ERCP versus not     electronically signed by Glo Velázquez MD

## 2020-05-19 NOTE — PROGRESS NOTES
21 [phone] -Spoke with pt's son this am. He voiced concern regarding the care of his mother the prior day, the day of her surgery. His concerns were passed on to attending physician along with his phone number. Questions and concerns were addressed as needed. Reassurance provided. Son spoke with his mother. 1230- Diet advanced per pt request, to clear liquids. tolerating well .   1300- c/o nausea  1330- vomiting. Removed all clear liquids from pt reach and educated on importance of taking it easy d/t condition in her esophagus. Ice chips only provided.

## 2020-05-19 NOTE — PROGRESS NOTES
finishing a course of Zyvox ordered by ID.     Anxiety continues overnight  VSS- modified barium   COVID swab  esogram possible if warranted by VSS; and possible peg tube eval  Fevers overnight prompting blood cultures x2 urinalysis with culture all pending at current time  Passed swallow study  UGI series show gastric outlet obstruction,GI consult added by surgery    Review of Systems:     Constitutional:  negative for chills, fevers, sweats  Respiratory:  negative for cough, dyspnea on exertion, shortness of breath, wheezing  Cardiovascular:  negative for chest pain, chest pressure/discomfort, lower extremity edema, palpitations  Gastrointestinal:  abdominal pain is better , no  constipation, diarrhea, nausea, vomiting  Neurological:  negative for dizziness, headache    Medications: Allergies:     Allergies   Allergen Reactions    Prednisone Anxiety    Compazine [Prochlorperazine Maleate]     Penicillin V Potassium     Penicillins Other (See Comments)     shock       Current Meds:   Scheduled Meds:    pantoprazole  40 mg Intravenous BID    And    sodium chloride (PF)  10 mL Intravenous BID    sucralfate  1 g Oral 4 times per day    metoprolol succinate  25 mg Oral Daily    [Held by provider] furosemide  20 mg Oral Daily    bisacodyl  10 mg Rectal Daily    clonazePAM  0.5 mg Oral BID    QUEtiapine  100 mg Oral Nightly    traZODone  100 mg Oral Nightly    tamsulosin  0.4 mg Oral Daily    metoclopramide  10 mg Intravenous Q6H    sodium chloride flush  10 mL Intravenous 2 times per day    enoxaparin  40 mg Subcutaneous Daily    amitriptyline  25 mg Oral TID    busPIRone  20 mg Oral TID    escitalopram  20 mg Oral Daily    gabapentin  300 mg Oral TID     Continuous Infusions:    PN-Adult 2-in-1 Central Line (Standard) 60 mL/hr at 05/18/20 1944    dextrose 5 % and 0.45 % NaCl 20 mL/hr at 05/16/20 1829     PRN Meds: fentanNYL, fentanNYL, fentanNYL, fentanNYL, oxyCODONE-acetaminophen, labetalol, hydrALAZINE, LORazepam, morphine, acetaminophen, sodium chloride flush, potassium chloride **OR** potassium alternative oral replacement **OR** potassium chloride, magnesium sulfate, nicotine    Data:     Past Medical History:   has a past medical history of Anxiety, Arthritis, Back pain, Bronchitis, Caffeine use, Carpal tunnel syndrome, Cataracts, bilateral, Constipation, Depression, Diarrhea, GERD (gastroesophageal reflux disease), H/O gastric ulcer, Hyperlipidemia, Hypertension, Mumps, MVP (mitral valve prolapse), Recurrent UTI, Sinusitis, Tracheostomy in place [facility]), Ulcerative esophagitis, and Wellness examination. Social History:   reports that she has been smoking cigarettes. She has a 50.00 pack-year smoking history. She has never used smokeless tobacco. She reports that she does not drink alcohol or use drugs. Family History:   Family History   Problem Relation Age of Onset   Tia Berman Cancer Mother         uterine    Heart Attack Mother     Heart Attack Father     Other Maternal Grandfather         foot gangrene    Other Paternal Grandmother         bleeding ulcers    Other Paternal Grandfather         lung cancer       Vitals:  /66   Pulse 100   Temp 99 °F (37.2 °C) (Oral)   Resp 16   Ht 5' 3\" (1.6 m)   Wt 182 lb (82.6 kg)   SpO2 95%   BMI 32.24 kg/m²   Temp (24hrs), Av.8 °F (36.6 °C), Min:97 °F (36.1 °C), Max:99 °F (37.2 °C)    Recent Labs     20  0608 20  1325 20  2318 20  0516   POCGLU 112* 118* 133* 124*       I/O (24Hr):     Intake/Output Summary (Last 24 hours) at 2020 1136  Last data filed at 2020 0735  Gross per 24 hour   Intake 1670 ml   Output 1675 ml   Net -5 ml       Labs:  Hematology:  Recent Labs     20  0621 20  0518 20  0531   WBC 11.1 7.8 8.2   RBC 3.57* 3.71* 3.62*   HGB 9.9* 10.0* 9.9*   HCT 32.5* 34.8* 34.2*   MCV 91.0 93.8 94.5   MCH 27.7 27.0 27.3   MCHC 30.5 28.7 28.9   RDW 18.1* 18.4* 18.5*    339 320

## 2020-05-20 LAB
ABSOLUTE EOS #: 0.18 K/UL (ref 0–0.44)
ABSOLUTE IMMATURE GRANULOCYTE: 0.07 K/UL (ref 0–0.3)
ABSOLUTE LYMPH #: 1.94 K/UL (ref 1.1–3.7)
ABSOLUTE MONO #: 0.89 K/UL (ref 0.1–1.2)
ALBUMIN SERPL-MCNC: 2.7 G/DL (ref 3.5–5.2)
ALBUMIN/GLOBULIN RATIO: 0.8 (ref 1–2.5)
ALP BLD-CCNC: 162 U/L (ref 35–104)
ALT SERPL-CCNC: 80 U/L (ref 5–33)
ANION GAP SERPL CALCULATED.3IONS-SCNC: 15 MMOL/L (ref 9–17)
AST SERPL-CCNC: 51 U/L
BASOPHILS # BLD: 0 % (ref 0–2)
BASOPHILS ABSOLUTE: 0.03 K/UL (ref 0–0.2)
BILIRUB SERPL-MCNC: 0.3 MG/DL (ref 0.3–1.2)
BILIRUBIN DIRECT: 0.13 MG/DL
BILIRUBIN, INDIRECT: 0.17 MG/DL (ref 0–1)
BUN BLDV-MCNC: 27 MG/DL (ref 8–23)
BUN/CREAT BLD: ABNORMAL (ref 9–20)
CALCIUM SERPL-MCNC: 9.3 MG/DL (ref 8.6–10.4)
CHLORIDE BLD-SCNC: 102 MMOL/L (ref 98–107)
CO2: 21 MMOL/L (ref 20–31)
CREAT SERPL-MCNC: 0.75 MG/DL (ref 0.5–0.9)
CULTURE: NORMAL
CULTURE: NORMAL
DIFFERENTIAL TYPE: ABNORMAL
EOSINOPHILS RELATIVE PERCENT: 2 % (ref 1–4)
GFR AFRICAN AMERICAN: >60 ML/MIN
GFR NON-AFRICAN AMERICAN: >60 ML/MIN
GFR SERPL CREATININE-BSD FRML MDRD: ABNORMAL ML/MIN/{1.73_M2}
GFR SERPL CREATININE-BSD FRML MDRD: ABNORMAL ML/MIN/{1.73_M2}
GLOBULIN: ABNORMAL G/DL (ref 1.5–3.8)
GLUCOSE BLD-MCNC: 103 MG/DL (ref 65–105)
GLUCOSE BLD-MCNC: 103 MG/DL (ref 65–105)
GLUCOSE BLD-MCNC: 108 MG/DL (ref 70–99)
GLUCOSE BLD-MCNC: 111 MG/DL (ref 65–105)
HCT VFR BLD CALC: 33.8 % (ref 36.3–47.1)
HEMOGLOBIN: 9.8 G/DL (ref 11.9–15.1)
IMMATURE GRANULOCYTES: 1 %
LYMPHOCYTES # BLD: 25 % (ref 24–43)
Lab: NORMAL
Lab: NORMAL
MAGNESIUM: 1.9 MG/DL (ref 1.6–2.6)
MCH RBC QN AUTO: 27.4 PG (ref 25.2–33.5)
MCHC RBC AUTO-ENTMCNC: 29 G/DL (ref 28.4–34.8)
MCV RBC AUTO: 94.4 FL (ref 82.6–102.9)
MITOCHONDRIAL ANTIBODY: 9.7 UNITS (ref 0–20)
MONOCYTES # BLD: 12 % (ref 3–12)
NRBC AUTOMATED: 0 PER 100 WBC
PDW BLD-RTO: 18.9 % (ref 11.8–14.4)
PHOSPHORUS: 3.1 MG/DL (ref 2.6–4.5)
PLATELET # BLD: 288 K/UL (ref 138–453)
PLATELET ESTIMATE: ABNORMAL
PMV BLD AUTO: 10.8 FL (ref 8.1–13.5)
POTASSIUM SERPL-SCNC: 4.1 MMOL/L (ref 3.7–5.3)
RBC # BLD: 3.58 M/UL (ref 3.95–5.11)
RBC # BLD: ABNORMAL 10*6/UL
SEG NEUTROPHILS: 60 % (ref 36–65)
SEGMENTED NEUTROPHILS ABSOLUTE COUNT: 4.63 K/UL (ref 1.5–8.1)
SODIUM BLD-SCNC: 138 MMOL/L (ref 135–144)
SPECIMEN DESCRIPTION: NORMAL
SPECIMEN DESCRIPTION: NORMAL
SURGICAL PATHOLOGY REPORT: NORMAL
TOTAL PROTEIN: 6.1 G/DL (ref 6.4–8.3)
WBC # BLD: 7.7 K/UL (ref 3.5–11.3)
WBC # BLD: ABNORMAL 10*3/UL

## 2020-05-20 PROCEDURE — 6370000000 HC RX 637 (ALT 250 FOR IP): Performed by: FAMILY MEDICINE

## 2020-05-20 PROCEDURE — 6360000002 HC RX W HCPCS: Performed by: INTERNAL MEDICINE

## 2020-05-20 PROCEDURE — 97530 THERAPEUTIC ACTIVITIES: CPT

## 2020-05-20 PROCEDURE — 1200000000 HC SEMI PRIVATE

## 2020-05-20 PROCEDURE — 6370000000 HC RX 637 (ALT 250 FOR IP): Performed by: NURSE PRACTITIONER

## 2020-05-20 PROCEDURE — 94761 N-INVAS EAR/PLS OXIMETRY MLT: CPT

## 2020-05-20 PROCEDURE — 99232 SBSQ HOSP IP/OBS MODERATE 35: CPT | Performed by: HOSPITALIST

## 2020-05-20 PROCEDURE — 82947 ASSAY GLUCOSE BLOOD QUANT: CPT

## 2020-05-20 PROCEDURE — 99232 SBSQ HOSP IP/OBS MODERATE 35: CPT | Performed by: SURGERY

## 2020-05-20 PROCEDURE — 6360000002 HC RX W HCPCS: Performed by: NURSE PRACTITIONER

## 2020-05-20 PROCEDURE — 85025 COMPLETE CBC W/AUTO DIFF WBC: CPT

## 2020-05-20 PROCEDURE — 36415 COLL VENOUS BLD VENIPUNCTURE: CPT

## 2020-05-20 PROCEDURE — 80048 BASIC METABOLIC PNL TOTAL CA: CPT

## 2020-05-20 PROCEDURE — 6360000002 HC RX W HCPCS: Performed by: HOSPITALIST

## 2020-05-20 PROCEDURE — 2580000003 HC RX 258: Performed by: NURSE PRACTITIONER

## 2020-05-20 PROCEDURE — 84100 ASSAY OF PHOSPHORUS: CPT

## 2020-05-20 PROCEDURE — U0003 INFECTIOUS AGENT DETECTION BY NUCLEIC ACID (DNA OR RNA); SEVERE ACUTE RESPIRATORY SYNDROME CORONAVIRUS 2 (SARS-COV-2) (CORONAVIRUS DISEASE [COVID-19]), AMPLIFIED PROBE TECHNIQUE, MAKING USE OF HIGH THROUGHPUT TECHNOLOGIES AS DESCRIBED BY CMS-2020-01-R: HCPCS

## 2020-05-20 PROCEDURE — 6370000000 HC RX 637 (ALT 250 FOR IP): Performed by: ANESTHESIOLOGY

## 2020-05-20 PROCEDURE — 99232 SBSQ HOSP IP/OBS MODERATE 35: CPT | Performed by: INTERNAL MEDICINE

## 2020-05-20 PROCEDURE — 2500000003 HC RX 250 WO HCPCS: Performed by: FAMILY MEDICINE

## 2020-05-20 PROCEDURE — 80076 HEPATIC FUNCTION PANEL: CPT

## 2020-05-20 PROCEDURE — C9113 INJ PANTOPRAZOLE SODIUM, VIA: HCPCS | Performed by: INTERNAL MEDICINE

## 2020-05-20 PROCEDURE — 2580000003 HC RX 258: Performed by: INTERNAL MEDICINE

## 2020-05-20 PROCEDURE — 83735 ASSAY OF MAGNESIUM: CPT

## 2020-05-20 PROCEDURE — 6370000000 HC RX 637 (ALT 250 FOR IP): Performed by: STUDENT IN AN ORGANIZED HEALTH CARE EDUCATION/TRAINING PROGRAM

## 2020-05-20 RX ORDER — ONDANSETRON 2 MG/ML
4 INJECTION INTRAMUSCULAR; INTRAVENOUS EVERY 6 HOURS PRN
Status: DISCONTINUED | OUTPATIENT
Start: 2020-05-20 | End: 2020-06-02 | Stop reason: HOSPADM

## 2020-05-20 RX ADMIN — METOCLOPRAMIDE 10 MG: 5 INJECTION, SOLUTION INTRAMUSCULAR; INTRAVENOUS at 23:32

## 2020-05-20 RX ADMIN — GABAPENTIN 300 MG: 300 CAPSULE ORAL at 10:12

## 2020-05-20 RX ADMIN — METOPROLOL SUCCINATE 25 MG: 25 TABLET, FILM COATED, EXTENDED RELEASE ORAL at 10:12

## 2020-05-20 RX ADMIN — AMITRIPTYLINE HYDROCHLORIDE 25 MG: 25 TABLET, FILM COATED ORAL at 10:12

## 2020-05-20 RX ADMIN — METOCLOPRAMIDE 10 MG: 5 INJECTION, SOLUTION INTRAMUSCULAR; INTRAVENOUS at 10:12

## 2020-05-20 RX ADMIN — ENOXAPARIN SODIUM 40 MG: 40 INJECTION SUBCUTANEOUS at 10:12

## 2020-05-20 RX ADMIN — PANTOPRAZOLE SODIUM 40 MG: 40 INJECTION, POWDER, LYOPHILIZED, FOR SOLUTION INTRAVENOUS at 23:33

## 2020-05-20 RX ADMIN — TAMSULOSIN HYDROCHLORIDE 0.4 MG: 0.4 CAPSULE ORAL at 10:12

## 2020-05-20 RX ADMIN — AMITRIPTYLINE HYDROCHLORIDE 25 MG: 25 TABLET, FILM COATED ORAL at 23:33

## 2020-05-20 RX ADMIN — SUCRALFATE 1 G: 1 TABLET ORAL at 00:45

## 2020-05-20 RX ADMIN — CLONAZEPAM 0.5 MG: 0.5 TABLET ORAL at 10:34

## 2020-05-20 RX ADMIN — BISACODYL 10 MG: 10 SUPPOSITORY RECTAL at 10:12

## 2020-05-20 RX ADMIN — METOCLOPRAMIDE 10 MG: 5 INJECTION, SOLUTION INTRAMUSCULAR; INTRAVENOUS at 17:46

## 2020-05-20 RX ADMIN — AMITRIPTYLINE HYDROCHLORIDE 25 MG: 25 TABLET, FILM COATED ORAL at 15:07

## 2020-05-20 RX ADMIN — MORPHINE SULFATE 2 MG: 2 INJECTION, SOLUTION INTRAMUSCULAR; INTRAVENOUS at 12:10

## 2020-05-20 RX ADMIN — ONDANSETRON 4 MG: 2 INJECTION INTRAMUSCULAR; INTRAVENOUS at 09:39

## 2020-05-20 RX ADMIN — SUCRALFATE 1 G: 1 TABLET ORAL at 05:47

## 2020-05-20 RX ADMIN — SUCRALFATE 1 G: 1 TABLET ORAL at 12:55

## 2020-05-20 RX ADMIN — PANTOPRAZOLE SODIUM 40 MG: 40 INJECTION, POWDER, LYOPHILIZED, FOR SOLUTION INTRAVENOUS at 10:12

## 2020-05-20 RX ADMIN — OXYCODONE HYDROCHLORIDE AND ACETAMINOPHEN 1 TABLET: 5; 325 TABLET ORAL at 13:01

## 2020-05-20 RX ADMIN — GABAPENTIN 300 MG: 300 CAPSULE ORAL at 15:07

## 2020-05-20 RX ADMIN — BUSPIRONE HYDROCHLORIDE 20 MG: 10 TABLET ORAL at 23:32

## 2020-05-20 RX ADMIN — SUCRALFATE 1 G: 1 TABLET ORAL at 17:47

## 2020-05-20 RX ADMIN — Medication 10 ML: at 23:32

## 2020-05-20 RX ADMIN — TRAZODONE HYDROCHLORIDE 100 MG: 100 TABLET ORAL at 23:58

## 2020-05-20 RX ADMIN — Medication 10 ML: at 10:12

## 2020-05-20 RX ADMIN — LORAZEPAM 1 MG: 2 INJECTION INTRAMUSCULAR at 09:39

## 2020-05-20 RX ADMIN — POTASSIUM CHLORIDE: 2 INJECTION, SOLUTION, CONCENTRATE INTRAVENOUS at 17:46

## 2020-05-20 RX ADMIN — BUSPIRONE HYDROCHLORIDE 20 MG: 10 TABLET ORAL at 15:07

## 2020-05-20 RX ADMIN — Medication 10 ML: at 04:43

## 2020-05-20 RX ADMIN — BUSPIRONE HYDROCHLORIDE 20 MG: 10 TABLET ORAL at 10:12

## 2020-05-20 RX ADMIN — SODIUM CHLORIDE, PRESERVATIVE FREE 10 ML: 5 INJECTION INTRAVENOUS at 23:34

## 2020-05-20 RX ADMIN — SODIUM CHLORIDE, PRESERVATIVE FREE 10 ML: 5 INJECTION INTRAVENOUS at 09:39

## 2020-05-20 RX ADMIN — GABAPENTIN 300 MG: 300 CAPSULE ORAL at 23:33

## 2020-05-20 RX ADMIN — SUCRALFATE 1 G: 1 TABLET ORAL at 23:33

## 2020-05-20 RX ADMIN — QUETIAPINE FUMARATE 100 MG: 100 TABLET ORAL at 23:32

## 2020-05-20 RX ADMIN — ESCITALOPRAM OXALATE 20 MG: 10 TABLET ORAL at 10:12

## 2020-05-20 RX ADMIN — METOCLOPRAMIDE 10 MG: 5 INJECTION, SOLUTION INTRAMUSCULAR; INTRAVENOUS at 04:43

## 2020-05-20 RX ADMIN — CLONAZEPAM 0.5 MG: 0.5 TABLET ORAL at 23:33

## 2020-05-20 ASSESSMENT — PAIN SCALES - GENERAL
PAINLEVEL_OUTOF10: 9
PAINLEVEL_OUTOF10: 10

## 2020-05-20 NOTE — PROGRESS NOTES
[facility] - Progress Note    Today's Date and Time: 5/20/2020, 2:27 PM    Impression :   1. Acute respiratory failure status post intubation 3/17, trach placement 3/27, Replacement in OR 4/2/2020  2. Status post PEG tube placement 3-27-20  3. Peptic ulcer disease  4. Status post large hiatal hernia repair and Nissen semi-fundoplication 9/60/6474  5. Pulmonary edema- Resolved  6. Pleural effusions  7. Acute kidney injury- Resolved  8. Persistent Nausea and intermittent vomiting  9. Abdominal pain  10. Reactive Leukocytosis  11. Severe esophagitis, nodular gastritis, pyloric stenosis by EGD on 5-18-20. S/P pyloric balloon dilatation. Recommendations:   · Antibiotic modifications:             Completed IV meropenem stop date 4/15//2020  ·         Discontinued Diflucan 400 mg p.o. x7 days stop date 3/26/2020  -         Completed Diflucan 200 mg po x 5 days. Stop date 5-10-20   Supportive care  · Monitor clinical progression. Medical Decision Making/Summary/Discussion:5/20/2020     ·   Infection Control Recommendations   · [place] Precautions    Antimicrobial Stewardship Recommendations     · Discontinuation of therapy  Coordination of Outpatient Care:     · Estimated Length of IV antimicrobials:None  · Patient will need Midline Catheter Insertion: No  · Patient will need PICC line Insertion:No  · Patient will need: Home IV , [facility]  [facility]: TBD  · Patient will need outpatient wound care:Yes  Chief complaint/reason for consultation:   · Intraabdominal infection  · Pneumonia    History of Present Illness:   Nikky Lara is a 76y.o.-year-old  female who was initially admitted on 5/12/2020. Patient seen at the request of Nimisha Lara. INITIAL EVALUATION:     Patient known to my service from prior evaluation at [place] [address].     Patient has a history of severe GERD despite being on PPI, H2 blocker and Tums.  She underwent laparoscopic robotic hiatal hernia repair and fundoplication last month on 02/24/2020 and postoperatively she developed hypoxia. CT chest done on 02/26/2020 showed bibasilar atelectasis/consolidation and patchy area of infiltrate in the right upper lobe. Patient was discharged on home oxygen on 2-28.     She [facility] [place] [facility] on 2-29 with worsening shortness of breath, dyspnea as well as an episode of dark red emesis. Her SaO2 was 60 % in the ED. There was concern of aspiration. CTA chest done was negative for PE, showed bilateral pleural effusions and severe esophageal dilatation, surgery was consulted. Patient was transferred to [facility] for further management.     Patient was started on noninvasive ventilation and then intubated and admitted to the ICU. She improved, was extubated on 3-7 to high flow O2. Pt was transferred out of the ICU on 3-13.     On 3-14 pts hgb dropped to 6.3 and she developed coffee-ground then bright red emesis, and was again transferred back to medical ICU.     General surgery performed endoscopy on 3/17 which showed an ulcer at the GE junction and mild gastritis with no active bleeding. After the procedure she developed acute respiratory distress and a fever of 39.8. She was emergently intubated and started on Meropenem and Vancomycin.      ID is consulted for antibiotic management. Patient was continued on meropenem. Vancomycin D/C     S/P trach and PEG placement 3/27     Flat plate of abdomen 7-32-91 with nonobstructive gas pattern with retained enteric contrast in the colon  On TPN. Patient could not tolerate tube feeds. Has superficial venous clot Rt Mid forearm to antecubital      Tracheal exchange in OR done on 4-2-20. Patient transferred to [place] on 4-6-20. Transferred back to [facility] on 5-12-20 because of persistent vomiting, abdominal pain. Concern for possible SBO. Pt NPO on TPN.    5-15 Passed swallow study to Dys III w/thickened liquids  Per surgery, pt for esophagram to evaluate the hiatal hernia repair. If the esophagram appears normal--> we will plan for a PEGogram and evaluate whether the stomach is emptying appropriately. CURRENT EVALUATION: 5/20/2020     Afebrile  VS stable RR 20-23  Remains anxious. Receiving medications to help with anxiety.     Tracheostomy capped. Tracheal secretions scant, whitish.      On TPN  X rays suspicious of at least partial or intermittent gastric outlet obstruction/paralysis of the pylorus. Severe esophagitis, nodular gastritis, pyloric stenosis by EGD on 5-18-20. S/P pyloric balloon dilatation. Biopsies negative for H pylori or tumor. Did not tolerate clear fluids   PEG clamped  If no problems clamping, additional trial of clear fluids     Pertinent Labs and X rays reviewed: 5/20/2020       BUN: 24-->19-->21->31-->27  Cr. :0.69-->1.0-->0.79->0.76-->0.81-->0.75     WBC: 7.7-->13.5-->10.0->8.9-->7.7  Hb:8.8-->9.0->9.5-->10-->9.8  Plat: 179-->268-->327->342-->339-->288     Cultures:  Urine:  · 4-11-20: No growth  Blood:  · 4-11-20: No growth  · 5-3 x 2 no growth  · 5-14 x 2 no growth to date  Sputum :  ·    Wound:  ·    5-14 COVID negative      CXR 4-11-20: cardiomegaly, lungs clear  KUB 4-13-20; Normal  CT Abdomen 4-13-20: Few gallstones with mild GB distension. Otherwise abdomen normal.  Bilateral pulmonary basilar infiltrates. Abd XR 4-27-20: Dilated loops of bowel. Ileus  CXR 4-27-20: Vascular congestion, small Lt pleural effusion  CXR 5-6-20:  Cardiomegaly with Rt side infiltrate  CXR 5-8-20: Cardiomegaly with minimal RUL infiltrate   CXR 5-13-20: Moderate edema improved    Discussed with patient, RN. I have personally reviewed the past medical history, past surgical history, medications, social history, and family history, and I have updated the database accordingly.   Past Medical History:     Past Medical History:   Diagnosis Date    Anxiety     Arthritis     Back pain     Bronchitis     Caffeine use     8 coffee / day    Carpal tunnel syndrome  Cataracts, bilateral     Constipation     Depression     Diarrhea     GERD (gastroesophageal reflux disease)     H/O gastric ulcer     Hyperlipidemia     Hypertension     Mumps     MVP (mitral valve prolapse)     Dr. Saad Ewing in May 2019    Recurrent UTI     Dr. Alyssia Mitchell    Sinusitis     Tracheostomy in place [facility])     Ulcerative esophagitis     Wellness examination     Dr. Js Vázquez seen in Jan 2020       Past Surgical  History:     Past Surgical History:   Procedure Laterality Date    APPENDECTOMY      CARPAL TUNNEL RELEASE      COLONOSCOPY      CYSTOSCOPY      EGD  3/17/2020         EYE SURGERY      patricia IOL    GASTRIC FUNDOPLICATION N/A 9/21/4073    XI LAPAROSCOPIC ROBOTIC HIATAL HERNIA REPAIR, CHERYL FUNDOPLICATION performed by Pool Bonds MD at [facility] N/A 3/27/2020    EGD PEG TUBE PLACEMENT performed by Pool Bonds MD at [address] [place] [address] CATH POWER PICC TRIPLE  3/4/2020         HIATAL HERNIA REPAIR  02/24/2020    LAPAROSCOPIC ROBOTIC HIATAL HERNIA REPAIR, CHERYL FUNDOPLICATION     HYSTERECTOMY      Abdominal    JOINT REPLACEMENT Right     right hip    OVARY REMOVAL      RHINOPLASTY      TONSILLECTOMY      TOTAL HIP ARTHROPLASTY      TOTAL NEPHRECTOMY      atrophic from infections, age 13   [place] TRACHEOSTOMY N/A 3/27/2020    **ADD ON **WANTS 10:00AM **TRACHEOTOMY performed by Pool Bonds MD at [address] N/A 4/2/2020    TRACHEOTOMY EXCHANGE performed by Pool Bonds MD at [address] 3/17/2020    BEDSIDE EGD ESOPHAGOGASTRODUODENOSCOPY (ICU) performed by Pool Bonds MD at [address] [place] [address]  05/18/2020    with balloon dialation and biopsy    UPPER GASTROINTESTINAL ENDOSCOPY N/A 5/18/2020    EGD BIOPSY performed by Jean-Pierre Adams MD at [address] [place] [address]  5/18/2020    EGD Decision Making-Imaging:   XR CHEST 1 VW:     Findings:  Mild cardiomegaly is present.  There is mild prominence of the pulmonary interstitium.  No consolidation, pleural effusion, or pneumothorax is present.  Midline tracheostomy tube is present.  Right-sided PICC line is present with the tip in the SVC.     IMPRESSION:     Mild cardiomegaly with mild prominence of the pulmonary interstitium.        Electronically Signed By: Rachelle Wagner D.O. M.SOliverio 04/06/2020 15:25:51 EDT     XR ABDOMEN KUB SUPINE:     Portable KUB.     Contrast injected through the feeding tube directly enters the mid stomach.  Bowel pattern is normal.  No incidental findings.     IMPRESSION:     Peg tube in the stomach.        Electronically Signed By: Dr. Preeti Tillman M.D. 04/06/2020 15:26:25 EDT        CT Abdomen and Pelvis w/o contrast: 4-13-20     Axial acquisition through the abdomen and pelvis without contrast.     Bilateral basilar pulmonary infiltrates are present left greater than right.     A few tiny gallstones are seen layering in the gallbladder, which is mildly distended.  There is no gallbladder wall thickening or pericholecystic fluid, edema.     No liver lesion or bile duct dilatation.     Spleen pancreas left kidney unremarkable.  Absent right kidney.     No intestinal obstruction.  No free air.  No ascites.  No abscess identified.     Urinary bladder is empty, Saldana catheter in place.  Uterus and adnexa not visualized.  Gastrostomy tube tip is in the body of the stomach.     IMPRESSION:     1. No intra-abdominal abscess.  No free air no ascites.  No obstruction. 2. Bilateral basilar pulmonary infiltrates.   3. A few tiny gallstones seen within the gallbladder.  Mild gallbladder distention.        Electronically Signed By: Dr. Alexy Dickerson M.D. 04/13/2020 12:10:34 EDT     XR ABDOMEN KUB SUPINE:4-13-20     Clinical statement: constipation.     Comparison: 4/6/2020.     Findings: A percutaneous gastrostomy tube appears in satisfactory position overlying the stomach.  A nonobstructive bowel gas pattern is noted. There is normal amount of stool appreciated within the colon. [facility] is no free air. There are no abnormal   masses or calcifications. The osseous structures and soft tissues are unremarkable.     IMPRESSION:     No acute finding. PEG tube is in place.        Electronically Signed By: Dr. Avery Singh M.D. 04/13/2020 9:45:23 EDT    Medical Decision Vnfubm-Xsmcaiis-Gcdid:       Medical Decision Making-Other:     Note:  · Labs, medications, radiologic studies were reviewed with personal review of films  · Large amounts of data were reviewed  · Discussed with nursing Staff, Discharge planner  · Infection Control and Prevention measures reviewed  · All prior entries were reviewed  · Administer medications as ordered  · Prognosis: Guarded  · Discharge planning reviewed  · Follow up as outpatient. Thank you for allowing us to participate in the care of this patient. Please call with questions.     Lavell Foss MD  Pager: [phone] - Office: [phone]

## 2020-05-20 NOTE — PLAN OF CARE
Problem: Pain:  Description: Pain management should include both nonpharmacologic and pharmacologic interventions.   Goal: Pain level will decrease  Description: Pain level will decrease  Outcome: Ongoing  Goal: Control of acute pain  Description: Control of acute pain  Outcome: Ongoing  Goal: Control of chronic pain  Description: Control of chronic pain  Outcome: Ongoing     Problem: Falls - Risk of:  Goal: Will remain free from falls  Description: Will remain free from falls  Outcome: Ongoing  Goal: Absence of physical injury  Description: Absence of physical injury  Outcome: Ongoing     Problem: Nutrition  Goal: Optimal nutrition therapy  Outcome: Ongoing     Problem: Musculor/Skeletal Functional Status  Goal: Highest potential functional level  Outcome: Ongoing     Problem: Infection:  Goal: Will remain free from infection  Description: Will remain free from infection  Outcome: Ongoing     Problem: Safety:  Goal: Free from accidental physical injury  Description: Free from accidental physical injury  Outcome: Ongoing  Goal: Free from intentional harm  Description: Free from intentional harm  Outcome: Ongoing     Problem: Daily Care:  Goal: Daily care needs are met  Description: Daily care needs are met  Outcome: Ongoing     Problem: Skin Integrity:  Goal: Skin integrity will stabilize  Description: Skin integrity will stabilize  Outcome: Ongoing     Problem: Discharge Planning:  Goal: Patients continuum of care needs are met  Description: Patients continuum of care needs are met  Outcome: Ongoing     Problem: MECHANICAL VENTILATION  Goal: Tracheostomy will be managed safely  Outcome: Ongoing     Problem: ABCDS Injury Assessment  Goal: Absence of physical injury  Outcome: Ongoing

## 2020-05-20 NOTE — PROGRESS NOTES
Surgery Progress Note            PATIENT NAME: Litzy Sy     TODAY'S DATE: 5/20/2020, 7:45 AM    SUBJECTIVE:    Pt seen and examined. Vomited yesterday after starting clears in the afternoon although she says she tolerated clears yesterday. Patient is very sleepy this AM and falling asleep during exam. Afebrile. OBJECTIVE:   VITALS:  /72   Pulse 88   Temp 98.4 °F (36.9 °C) (Oral)   Resp 16   Ht 5' 3\" (1.6 m)   Wt 177 lb (80.3 kg)   SpO2 96%   BMI 31.35 kg/m²      INTAKE/OUTPUT:      Intake/Output Summary (Last 24 hours) at 5/20/2020 0745  Last data filed at 5/20/2020 5903  Gross per 24 hour   Intake 2157 ml   Output 810 ml   Net 1347 ml       PHYSICAL EXAM  General Appearance: in no acute distress  Skin:  Warm and dry   Head/face:  NCAT and sclera anicteric   Lungs:  Respirations even and unlabored   Heart:  Heart regular rate and rhythm  Abdomen:  Soft, non-distended, non-tender. PEG tube clmaped; no erythema or drainage at tube site.    Extremities: no edema and no cyanosis          Data:  CBC with Differential:    Lab Results   Component Value Date    WBC 7.7 05/20/2020    RBC 3.58 05/20/2020    HGB 9.8 05/20/2020    HCT 33.8 05/20/2020     05/20/2020    MCV 94.4 05/20/2020    MCH 27.4 05/20/2020    MCHC 29.0 05/20/2020    RDW 18.9 05/20/2020    NRBC 1 05/12/2020    LYMPHOPCT 25 05/20/2020    MONOPCT 12 05/20/2020    BASOPCT 0 05/20/2020    MONOSABS 0.89 05/20/2020    LYMPHSABS 1.94 05/20/2020    EOSABS 0.18 05/20/2020    BASOSABS 0.03 05/20/2020    DIFFTYPE NOT REPORTED 05/20/2020     BMP:    Lab Results   Component Value Date     05/20/2020    K 4.1 05/20/2020     05/20/2020    CO2 21 05/20/2020    BUN 27 05/20/2020    LABALBU 2.7 05/20/2020    CREATININE 0.75 05/20/2020    CALCIUM 9.3 05/20/2020    GFRAA >60 05/20/2020    LABGLOM >60 05/20/2020    GLUCOSE 108 05/20/2020       Radiology Review:    EXAMINATION:   RIGHT UPPER QUADRANT ULTRASOUND       5/16/2020 12:45 pm

## 2020-05-20 NOTE — PROGRESS NOTES
Niur[facility]    Progress Note    5/20/2020    11:17 AM    Name:   Anuj Ram  MRN:     [id number]     Acct:      [de-identified]   Room:   [address] [place] [address] Day:  8  Admit Date:  5/12/2020  3:19 PM    PCP:   Kristen Franco MD  Code Status:  Full Code    Subjective:     C/C: Abdominal pain    Interval History Status: worsened. She was seen and examined at bedside. Patient was vomiting overnight. Patient also able to tolerate clears yesterday but her symptoms worsened. No other acute issue. Denies fever chills. No chest pain or shortness of breath. Brief History:     Per records:     Silvio Roldan a 76 y.o. Non-/non  female who presents with No chief complaint on file.   and is admitted to the hospital for the management of SBO  Patient transferred to our facility from [facility] with possibility of bowel obstruction, 2 months ago she had prolonged extensive hospital stay, at the end of February had a Nissen fundoplication with possible aspiration issues and was admitted and stayed in the hospital for 37 days during that time she was intubated twice had hematemesis and had EGD that showed gastric ulcer and was seen by a specialist and after long course she was successfully transitioned to a trach and PEG and discharged to [facility] on 4/6. This time the patient has been having increasing abdominal pain nausea and vomiting over that has been worsening since she was discharged over at [facility]  Patient had ongoing issues since then, had SBO that resolved , since then she has an issue  with tolerating TF,  a PICC line placed on 4/25 at [facility] and was startd TPN , given she had persistent N/V, TF held and  PEG tube has been down to gravity for the last couple of days.   She also has been treated for UTI and finishing a course of Zyvox ordered by ID.     Anxiety continues overnight  VSS- modified barium   COVID swab  esogram possible if warranted by VSS; and possible peg tube eval  Fevers overnight prompting blood cultures x2 urinalysis with culture all pending at current time  Passed swallow study  UGI series show gastric outlet obstruction,GI consult added by surgery    Review of Systems:     Constitutional:  negative for chills, fevers, sweats  Respiratory:  negative for cough, dyspnea on exertion, shortness of breath, wheezing  Cardiovascular:  negative for chest pain, chest pressure/discomfort, lower extremity edema, palpitations  Gastrointestinal: Patient has nausea and vomiting. Patient has abdominal pain. Neurological:  negative for dizziness, headache    Medications: Allergies:     Allergies   Allergen Reactions    Prednisone Anxiety    Compazine [Prochlorperazine Maleate]     Penicillin V Potassium     Penicillins Other (See Comments)     shock       Current Meds:   Scheduled Meds:    pantoprazole  40 mg Intravenous BID    And    sodium chloride (PF)  10 mL Intravenous BID    sucralfate  1 g Oral 4 times per day    metoprolol succinate  25 mg Oral Daily    [Held by provider] furosemide  20 mg Oral Daily    bisacodyl  10 mg Rectal Daily    clonazePAM  0.5 mg Oral BID    QUEtiapine  100 mg Oral Nightly    traZODone  100 mg Oral Nightly    tamsulosin  0.4 mg Oral Daily    metoclopramide  10 mg Intravenous Q6H    sodium chloride flush  10 mL Intravenous 2 times per day    enoxaparin  40 mg Subcutaneous Daily    amitriptyline  25 mg Oral TID    busPIRone  20 mg Oral TID    escitalopram  20 mg Oral Daily    gabapentin  300 mg Oral TID     Continuous Infusions:    PN-Adult 2-in-1 Central Line (Standard) 60 mL/hr at 05/19/20 1745    dextrose 5 % and 0.45 % NaCl 20 mL/hr at 05/16/20 1829     PRN Meds: ondansetron, fentanNYL, fentanNYL, fentanNYL, fentanNYL, oxyCODONE-acetaminophen, labetalol, hydrALAZINE, LORazepam, morphine, acetaminophen, sodium chloride flush, potassium chloride **OR** potassium alternative oral

## 2020-05-20 NOTE — PROGRESS NOTES
Panel:  Lab Results   Component Value Date    HEPBSAG NONREACTIVE 05/16/2020    HEPCAB NONREACTIVE 05/16/2020    HEPBIGM NONREACTIVE 05/16/2020    HEPAIGM NONREACTIVE 05/16/2020       HCV Genotype:  No results found for: HEPATITISCGENOTYPE    HCV Quantitative:  No results found for: HCVQNT    LIVER WORK UP:    AFP  No results found for: AFP    Alpha 1 antitrypsin   No results found for: A1A    Anti - Liver/Kidney Ab  No results found for: LIVER-KIDNEYMICROSOMALAB    TOÑO  Lab Results   Component Value Date    TOÑO NEGATIVE 05/16/2020       AMA  Lab Results   Component Value Date    MITOAB 9.7 05/16/2020       ASMA  Lab Results   Component Value Date    SMOOTHMUSCAB 15 05/16/2020       Ceruloplasmin  No results found for: CERULOPLSM    Celiac panel  No results found for: TISSTRNTIIGG, TTGIGA, IGA    PT/INR  No results for input(s): PROTIME, INR in the last 72 hours. Cancer Markers:  CEA:  No results for input(s): CEA in the last 72 hours. Ca 125:  No results for input(s):  in the last 72 hours. Ca 19-9:   Invalid input(s):   AFP: No results for input(s): AFP in the last 72 hours.   Lactic acid:Invalid input(s): LACTIC ACID    Radiology Review:    5/18/2020 MRI:  FINDINGS:   Liver appears normal in contour with mild hepatic steatosis.  No intrahepatic   bile duct dilatation.  No abnormal T2 lesion is seen to suggest underlying   mass.       The gallbladder demonstrates cholelithiasis, confirmed on the ultrasound.  No   pericholecystic fluid.  No gallbladder wall thickening.  The CBD appears   mildly prominent measuring 8 mm in greatest dimension with a questionable   filling defect seen within the distal CBD at the level of the ampulla best   seen on series 9, image 13.  Pancreatic divisum.  Pancreatic duct appears   normal in caliber.  Spleen is grossly unremarkable.  Pancreas appears grossly   unremarkable.  Adrenal glands appear grossly unremarkable.  Right kidney not   visualized.  Left kidney demonstrates no hydronephrosis.  Subcentimeter T2   hyperintense lesion is identified involving the left kidney, too small for   accurate characterization.  Abdominal aorta appears normal in caliber.  No   bulky lymphadenopathy or ascites.  No pleural effusion.  Moderate size hiatal   hernia.  Scoliosis of the visualized spine.           Impression   1. Cholelithiasis. Charlotte Alejadnro prominent CBD with a suspected filling defect seen   within the distal CBD suggestive of choledocholithiasis.  Further evaluation   with ERCP is recommended. 2. Hepatic steatosis. 3. Moderate size hiatal hernia. Principal Problem:    Gastric outlet obstruction  Active Problems:    MVP (mitral valve prolapse)    Centrilobular emphysema (HCC)    Fever    Recurrent UTI    Tracheostomy in place [facility])    H/O gastric ulcer    Hyperlipidemia    Hypertension    Tobacco abuse    Chronic respiratory failure with hypoxia (HCC)    Status post insertion of percutaneous endoscopic gastrostomy (PEG) tube (HCC)    S/P Nissen fundoplication (without gastrostomy tube) procedure    Paralytic ileus ([address]    Elevated liver enzymes  Resolved Problems:    * No resolved hospital problems. *       GI Assessment and plan:  1. Severe esophagitis, gastritis. BX taken-No metaplasia, dysplasia, neoplasm, or H. Pylori  -Rec Protonix 40 mg IV BID will in house-then can transition to po   -Rec Carafate 1 gr QID-crush to make slurry for administration  -Diet per GS    2. Abnormal LFT's. Transaminase slightly increased today  Given sx over the last 24 hrs, increased transaminase and imaging. Choledocholithiasis is suspected. -Will monitor labs in am  -Will plan for ERCP tomorrow-NPO [place]. No morning Lovenox-COVID testing ordered(last was 5/14/2020)  -Discussed plan at bedside with Dr. Iva Mendosa who plans for Cholecystectomy Friday with pyeloplasty (son called with decision)     Will follow  Thank you for allowing me to participate in the care of your patient.

## 2020-05-20 NOTE — PROGRESS NOTES
Physical Therapy  Facility/Department: [facility]  Daily Treatment Note  NAME: Cece Farmer  : 1945  MRN: [id number]    Date of Service: 2020    Discharge Recommendations:  Patient would benefit from continued therapy after discharge        Assessment   Body structures, Functions, Activity limitations: Decreased functional mobility ; Decreased endurance;Decreased strength;Decreased balance  Assessment: pt requiring modA for bed mobility, Pt displays decreased tolerance to EOB this date, pt c/o pain and fatigue; Pt not safe to return home w/o assistance  Prognosis: Good  Decision Making: Medium Complexity  PT Education: Plan of Care;General Safety; Functional Mobility Training;Home Exercise Program  Patient Education: Importance of mobility and ROM  REQUIRES PT FOLLOW UP: Yes  Activity Tolerance  Activity Tolerance: Patient limited by fatigue;Patient limited by pain; Patient limited by endurance     Patient Diagnosis(es): There were no encounter diagnoses. has a past medical history of Anxiety, Arthritis, Back pain, Bronchitis, Caffeine use, Carpal tunnel syndrome, Cataracts, bilateral, Constipation, Depression, Diarrhea, GERD (gastroesophageal reflux disease), H/O gastric ulcer, Hyperlipidemia, Hypertension, Mumps, MVP (mitral valve prolapse), Recurrent UTI, Sinusitis, Tracheostomy in place [facility]), Ulcerative esophagitis, and Wellness examination. has a past surgical history that includes Hysterectomy; total nephrectomy; Tonsillectomy; Appendectomy; Cystoscopy; Ovary removal; Tubal ligation; rhinoplasty; Carpal tunnel release; Hand surgery; Total hip arthroplasty; eye surgery; Colonoscopy; joint replacement (Right); hiatal hernia repair (2020); Gastric fundoplication (N/A, 3/59/1138);  cath power picc triple (3/4/2020); EGD (3/17/2020); Upper gastrointestinal endoscopy (N/A, 3/17/2020); tracheostomy (N/A, 3/27/2020);  Gastrostomy tube placement (N/A, 3/27/2020); tracheostomy (N/A, Education & Training  Safety Devices  Type of devices: Left in bed, Nurse notified, Call light within reach, Patient at risk for falls, All fall risk precautions in place, Bed alarm in place     Therapy Time   Individual Concurrent Group Co-treatment   Time In 1314         Time Out 1333         Minutes [id number] Amaris Moreno [facility],[address], PTA

## 2020-05-20 NOTE — PROGRESS NOTES
Trach care performed:  Site cleaned, dressing and inner cannula changed. Site was clear, ostomy margins clean, pink and dry.  Patient has speaking valve in place

## 2020-05-20 NOTE — PROGRESS NOTES
Nutrition Assessment (Parenteral Nutrition)    Type and Reason for Visit: Reassess    Nutrition Recommendations:   -Continue Clear Liquid diet, advance as tolerated  -Continue ensure clear supplements TID  -Continue PN 2-in-1 custom central @ 60 mL/hr x 24 hrs (1440 mLs) + 300 gms dextrose + 75 gms AA ~> 1320 kcals, 75 gms protein -- With D5 @ 20 mL/hr ~> 1402 kcals/d  -Recommend rechecking triglycerides   -Will continue monitor TPN, po intake and labs      Nutrition Assessment: TPN continues. Pt reports that she has been having emesis after she consumes her CL meals/supplements. Will continue supplements if pt is able to tolerate within the next few days. Pt plans for ERCP tomorrow. Will continue to monitor. Malnutrition Assessment:  · Malnutrition Status: At risk for malnutrition  · Context: Acute illness or injury  · Findings of the 6 clinical characteristics of malnutrition (Minimum of 2 out of 6 clinical characteristics is required to make the diagnosis of moderate or severe Protein Calorie Malnutrition based on AND/ASPEN Guidelines):  1. Energy Intake-Greater than 75% of estimated energy requirement, Greater than or equal to 1 month(w/ TPN)    2. Weight Loss-No significant weight loss,    3. Fat Loss-No significant subcutaneous fat loss,    4. Muscle Loss-No significant muscle mass loss,    5. Fluid Accumulation-No significant fluid accumulation,    6.   Strength-Not measured    Nutrition Risk Level: High    Nutrient Needs:  · Estimated Daily Total Kcal: 1.2-1.3 ~> 0825-2583 kcals/d   · Estimated Daily Protein (g): 1.2-1.4 gm/kg ~> 60-75 gms/d     Nutrition Diagnosis:   · Problem: Inadequate oral intake  · Etiology: related to Alteration in GI function     Signs and symptoms:  as evidenced by Intake 0-25%, Nutrition support - PN(Need for ONS )    Objective Information:  · Nutrition-Focused Physical Findings: Meds/labs reviewed   · Wound Type: Open Wounds  · Current Nutrition Therapies:  · Oral Diet

## 2020-05-21 ENCOUNTER — ANESTHESIA EVENT (OUTPATIENT)
Dept: OPERATING ROOM | Age: 75
DRG: 326 | End: 2020-05-21
Payer: MEDICARE

## 2020-05-21 ENCOUNTER — ANESTHESIA (OUTPATIENT)
Dept: ENDOSCOPY | Age: 75
DRG: 326 | End: 2020-05-21
Payer: MEDICARE

## 2020-05-21 ENCOUNTER — APPOINTMENT (OUTPATIENT)
Dept: GENERAL RADIOLOGY | Age: 75
DRG: 326 | End: 2020-05-21
Attending: FAMILY MEDICINE
Payer: MEDICARE

## 2020-05-21 ENCOUNTER — ANESTHESIA EVENT (OUTPATIENT)
Dept: ENDOSCOPY | Age: 75
DRG: 326 | End: 2020-05-21
Payer: MEDICARE

## 2020-05-21 VITALS — TEMPERATURE: 96.8 F | DIASTOLIC BLOOD PRESSURE: 74 MMHG | SYSTOLIC BLOOD PRESSURE: 98 MMHG | OXYGEN SATURATION: 97 %

## 2020-05-21 LAB
ABSOLUTE EOS #: 0.23 K/UL (ref 0–0.44)
ABSOLUTE IMMATURE GRANULOCYTE: 0.09 K/UL (ref 0–0.3)
ABSOLUTE LYMPH #: 1.93 K/UL (ref 1.1–3.7)
ABSOLUTE MONO #: 0.85 K/UL (ref 0.1–1.2)
ALBUMIN SERPL-MCNC: 2.8 G/DL (ref 3.5–5.2)
ALBUMIN/GLOBULIN RATIO: 0.9 (ref 1–2.5)
ALP BLD-CCNC: 147 U/L (ref 35–104)
ALT SERPL-CCNC: 61 U/L (ref 5–33)
ANION GAP SERPL CALCULATED.3IONS-SCNC: 14 MMOL/L (ref 9–17)
AST SERPL-CCNC: 31 U/L
BASOPHILS # BLD: 0 % (ref 0–2)
BASOPHILS ABSOLUTE: 0.03 K/UL (ref 0–0.2)
BILIRUB SERPL-MCNC: 0.2 MG/DL (ref 0.3–1.2)
BUN BLDV-MCNC: 25 MG/DL (ref 8–23)
BUN/CREAT BLD: ABNORMAL (ref 9–20)
CALCIUM SERPL-MCNC: 9.2 MG/DL (ref 8.6–10.4)
CHLORIDE BLD-SCNC: 104 MMOL/L (ref 98–107)
CO2: 21 MMOL/L (ref 20–31)
CREAT SERPL-MCNC: 0.8 MG/DL (ref 0.5–0.9)
DIFFERENTIAL TYPE: ABNORMAL
EOSINOPHILS RELATIVE PERCENT: 3 % (ref 1–4)
GFR AFRICAN AMERICAN: >60 ML/MIN
GFR NON-AFRICAN AMERICAN: >60 ML/MIN
GFR SERPL CREATININE-BSD FRML MDRD: ABNORMAL ML/MIN/{1.73_M2}
GFR SERPL CREATININE-BSD FRML MDRD: ABNORMAL ML/MIN/{1.73_M2}
GLUCOSE BLD-MCNC: 108 MG/DL (ref 65–105)
GLUCOSE BLD-MCNC: 110 MG/DL (ref 70–99)
GLUCOSE BLD-MCNC: 115 MG/DL (ref 65–105)
GLUCOSE BLD-MCNC: 120 MG/DL (ref 65–105)
GLUCOSE BLD-MCNC: 120 MG/DL (ref 65–105)
GLUCOSE BLD-MCNC: 123 MG/DL (ref 65–105)
GLUCOSE BLD-MCNC: 98 MG/DL (ref 65–105)
HCT VFR BLD CALC: 31 % (ref 36.3–47.1)
HEMOGLOBIN: 8.8 G/DL (ref 11.9–15.1)
IMMATURE GRANULOCYTES: 1 %
LYMPHOCYTES # BLD: 28 % (ref 24–43)
MCH RBC QN AUTO: 26.7 PG (ref 25.2–33.5)
MCHC RBC AUTO-ENTMCNC: 28.4 G/DL (ref 28.4–34.8)
MCV RBC AUTO: 94.2 FL (ref 82.6–102.9)
MONOCYTES # BLD: 12 % (ref 3–12)
NRBC AUTOMATED: 0 PER 100 WBC
PDW BLD-RTO: 18.5 % (ref 11.8–14.4)
PLATELET # BLD: 266 K/UL (ref 138–453)
PLATELET ESTIMATE: ABNORMAL
PMV BLD AUTO: 10.9 FL (ref 8.1–13.5)
POTASSIUM SERPL-SCNC: 4.3 MMOL/L (ref 3.7–5.3)
RBC # BLD: 3.29 M/UL (ref 3.95–5.11)
RBC # BLD: ABNORMAL 10*6/UL
SARS-COV-2, PCR: NORMAL
SARS-COV-2, RAPID: NORMAL
SARS-COV-2: NOT DETECTED
SEG NEUTROPHILS: 56 % (ref 36–65)
SEGMENTED NEUTROPHILS ABSOLUTE COUNT: 3.83 K/UL (ref 1.5–8.1)
SODIUM BLD-SCNC: 139 MMOL/L (ref 135–144)
SOURCE: NORMAL
TOTAL PROTEIN: 5.9 G/DL (ref 6.4–8.3)
TRIGL SERPL-MCNC: 408 MG/DL
WBC # BLD: 7 K/UL (ref 3.5–11.3)
WBC # BLD: ABNORMAL 10*3/UL

## 2020-05-21 PROCEDURE — 6360000002 HC RX W HCPCS: Performed by: INTERNAL MEDICINE

## 2020-05-21 PROCEDURE — 0F798DZ DILATION OF COMMON BILE DUCT WITH INTRALUMINAL DEVICE, VIA NATURAL OR ARTIFICIAL OPENING ENDOSCOPIC: ICD-10-PCS | Performed by: INTERNAL MEDICINE

## 2020-05-21 PROCEDURE — 97110 THERAPEUTIC EXERCISES: CPT

## 2020-05-21 PROCEDURE — 6370000000 HC RX 637 (ALT 250 FOR IP): Performed by: NURSE PRACTITIONER

## 2020-05-21 PROCEDURE — 6370000000 HC RX 637 (ALT 250 FOR IP): Performed by: INTERNAL MEDICINE

## 2020-05-21 PROCEDURE — 6360000002 HC RX W HCPCS: Performed by: NURSE ANESTHETIST, CERTIFIED REGISTERED

## 2020-05-21 PROCEDURE — 80053 COMPREHEN METABOLIC PANEL: CPT

## 2020-05-21 PROCEDURE — C9113 INJ PANTOPRAZOLE SODIUM, VIA: HCPCS | Performed by: INTERNAL MEDICINE

## 2020-05-21 PROCEDURE — 99232 SBSQ HOSP IP/OBS MODERATE 35: CPT | Performed by: INTERNAL MEDICINE

## 2020-05-21 PROCEDURE — 7100000000 HC PACU RECOVERY - FIRST 15 MIN: Performed by: INTERNAL MEDICINE

## 2020-05-21 PROCEDURE — 2709999900 HC NON-CHARGEABLE SUPPLY: Performed by: INTERNAL MEDICINE

## 2020-05-21 PROCEDURE — 6360000002 HC RX W HCPCS: Performed by: NURSE PRACTITIONER

## 2020-05-21 PROCEDURE — 85025 COMPLETE CBC W/AUTO DIFF WBC: CPT

## 2020-05-21 PROCEDURE — 2500000003 HC RX 250 WO HCPCS: Performed by: NURSE ANESTHETIST, CERTIFIED REGISTERED

## 2020-05-21 PROCEDURE — 2580000003 HC RX 258: Performed by: INTERNAL MEDICINE

## 2020-05-21 PROCEDURE — C9113 INJ PANTOPRAZOLE SODIUM, VIA: HCPCS | Performed by: NURSE PRACTITIONER

## 2020-05-21 PROCEDURE — 99232 SBSQ HOSP IP/OBS MODERATE 35: CPT | Performed by: SURGERY

## 2020-05-21 PROCEDURE — 82947 ASSAY GLUCOSE BLOOD QUANT: CPT

## 2020-05-21 PROCEDURE — 7100000001 HC PACU RECOVERY - ADDTL 15 MIN: Performed by: INTERNAL MEDICINE

## 2020-05-21 PROCEDURE — 36415 COLL VENOUS BLD VENIPUNCTURE: CPT

## 2020-05-21 PROCEDURE — 2580000003 HC RX 258: Performed by: NURSE ANESTHETIST, CERTIFIED REGISTERED

## 2020-05-21 PROCEDURE — C2625 STENT, NON-COR, TEM W/DEL SY: HCPCS | Performed by: INTERNAL MEDICINE

## 2020-05-21 PROCEDURE — 6360000002 HC RX W HCPCS: Performed by: ANESTHESIOLOGY

## 2020-05-21 PROCEDURE — 1200000000 HC SEMI PRIVATE

## 2020-05-21 PROCEDURE — 43274 ERCP DUCT STENT PLACEMENT: CPT | Performed by: INTERNAL MEDICINE

## 2020-05-21 PROCEDURE — 3609014300 HC ERCP BALLOON DILATE BILIARY/PANC DUCT/AMPULLA EA: Performed by: INTERNAL MEDICINE

## 2020-05-21 PROCEDURE — 6360000002 HC RX W HCPCS: Performed by: STUDENT IN AN ORGANIZED HEALTH CARE EDUCATION/TRAINING PROGRAM

## 2020-05-21 PROCEDURE — 3700000000 HC ANESTHESIA ATTENDED CARE: Performed by: INTERNAL MEDICINE

## 2020-05-21 PROCEDURE — 74328 X-RAY BILE DUCT ENDOSCOPY: CPT

## 2020-05-21 PROCEDURE — 2580000003 HC RX 258: Performed by: NURSE PRACTITIONER

## 2020-05-21 PROCEDURE — C1769 GUIDE WIRE: HCPCS | Performed by: INTERNAL MEDICINE

## 2020-05-21 PROCEDURE — 99232 SBSQ HOSP IP/OBS MODERATE 35: CPT | Performed by: HOSPITALIST

## 2020-05-21 PROCEDURE — 3700000001 HC ADD 15 MINUTES (ANESTHESIA): Performed by: INTERNAL MEDICINE

## 2020-05-21 PROCEDURE — 74330 X-RAY BILE/PANC ENDOSCOPY: CPT | Performed by: INTERNAL MEDICINE

## 2020-05-21 PROCEDURE — 97530 THERAPEUTIC ACTIVITIES: CPT

## 2020-05-21 PROCEDURE — 2500000003 HC RX 250 WO HCPCS: Performed by: NURSE PRACTITIONER

## 2020-05-21 PROCEDURE — 84478 ASSAY OF TRIGLYCERIDES: CPT

## 2020-05-21 PROCEDURE — 3609014900 HC ERCP W/SPHINCTEROTOMY &/OR PAPILLOTOMY: Performed by: INTERNAL MEDICINE

## 2020-05-21 PROCEDURE — 2720000010 HC SURG SUPPLY STERILE: Performed by: INTERNAL MEDICINE

## 2020-05-21 PROCEDURE — BF111ZZ FLUOROSCOPY OF BILIARY AND PANCREATIC DUCTS USING LOW OSMOLAR CONTRAST: ICD-10-PCS | Performed by: INTERNAL MEDICINE

## 2020-05-21 PROCEDURE — 3609015100 HC ERCP STENT PLACEMENT BILIARY/PANCREATIC DUCT: Performed by: INTERNAL MEDICINE

## 2020-05-21 DEVICE — BILIARY STENT WITH NAVIFLEXTM RX DELIVERY SYSTEM
Type: IMPLANTABLE DEVICE | Status: FUNCTIONAL
Brand: ADVANIX™ BILIARY

## 2020-05-21 RX ORDER — EPHEDRINE SULFATE/0.9% NACL/PF 50 MG/5 ML
SYRINGE (ML) INTRAVENOUS PRN
Status: DISCONTINUED | OUTPATIENT
Start: 2020-05-21 | End: 2020-05-21 | Stop reason: SDUPTHER

## 2020-05-21 RX ORDER — ONDANSETRON 2 MG/ML
4 INJECTION INTRAMUSCULAR; INTRAVENOUS
Status: DISCONTINUED | OUTPATIENT
Start: 2020-05-21 | End: 2020-05-21

## 2020-05-21 RX ORDER — PROPOFOL 10 MG/ML
INJECTION, EMULSION INTRAVENOUS PRN
Status: DISCONTINUED | OUTPATIENT
Start: 2020-05-21 | End: 2020-05-21 | Stop reason: SDUPTHER

## 2020-05-21 RX ORDER — DIPHENHYDRAMINE HYDROCHLORIDE 50 MG/ML
12.5 INJECTION INTRAMUSCULAR; INTRAVENOUS
Status: DISCONTINUED | OUTPATIENT
Start: 2020-05-21 | End: 2020-05-21

## 2020-05-21 RX ORDER — FENTANYL CITRATE 50 UG/ML
25 INJECTION, SOLUTION INTRAMUSCULAR; INTRAVENOUS EVERY 5 MIN PRN
Status: DISCONTINUED | OUTPATIENT
Start: 2020-05-21 | End: 2020-05-21

## 2020-05-21 RX ORDER — MIDAZOLAM HYDROCHLORIDE 1 MG/ML
INJECTION INTRAMUSCULAR; INTRAVENOUS PRN
Status: DISCONTINUED | OUTPATIENT
Start: 2020-05-21 | End: 2020-05-21 | Stop reason: SDUPTHER

## 2020-05-21 RX ORDER — OXYCODONE HYDROCHLORIDE AND ACETAMINOPHEN 5; 325 MG/1; MG/1
2 TABLET ORAL PRN
Status: DISCONTINUED | OUTPATIENT
Start: 2020-05-21 | End: 2020-05-21

## 2020-05-21 RX ORDER — SODIUM CHLORIDE, SODIUM LACTATE, POTASSIUM CHLORIDE, CALCIUM CHLORIDE 600; 310; 30; 20 MG/100ML; MG/100ML; MG/100ML; MG/100ML
INJECTION, SOLUTION INTRAVENOUS CONTINUOUS PRN
Status: DISCONTINUED | OUTPATIENT
Start: 2020-05-21 | End: 2020-05-21 | Stop reason: SDUPTHER

## 2020-05-21 RX ORDER — MORPHINE SULFATE 2 MG/ML
2 INJECTION, SOLUTION INTRAMUSCULAR; INTRAVENOUS EVERY 5 MIN PRN
Status: DISCONTINUED | OUTPATIENT
Start: 2020-05-21 | End: 2020-05-21

## 2020-05-21 RX ORDER — OXYCODONE HYDROCHLORIDE AND ACETAMINOPHEN 5; 325 MG/1; MG/1
1 TABLET ORAL PRN
Status: DISCONTINUED | OUTPATIENT
Start: 2020-05-21 | End: 2020-05-21

## 2020-05-21 RX ADMIN — PROPOFOL 50 MG: 10 INJECTION, EMULSION INTRAVENOUS at 15:35

## 2020-05-21 RX ADMIN — Medication 10 MG: at 14:36

## 2020-05-21 RX ADMIN — SODIUM CHLORIDE, PRESERVATIVE FREE 10 ML: 5 INJECTION INTRAVENOUS at 21:50

## 2020-05-21 RX ADMIN — PANTOPRAZOLE SODIUM 40 MG: 40 INJECTION, POWDER, LYOPHILIZED, FOR SOLUTION INTRAVENOUS at 21:47

## 2020-05-21 RX ADMIN — METOCLOPRAMIDE 10 MG: 5 INJECTION, SOLUTION INTRAMUSCULAR; INTRAVENOUS at 10:26

## 2020-05-21 RX ADMIN — SODIUM CHLORIDE: 234 INJECTION INTRAMUSCULAR; INTRAVENOUS; SUBCUTANEOUS at 18:02

## 2020-05-21 RX ADMIN — GABAPENTIN 300 MG: 300 CAPSULE ORAL at 17:55

## 2020-05-21 RX ADMIN — PANTOPRAZOLE SODIUM 40 MG: 40 INJECTION, POWDER, LYOPHILIZED, FOR SOLUTION INTRAVENOUS at 10:26

## 2020-05-21 RX ADMIN — METOCLOPRAMIDE 10 MG: 5 INJECTION, SOLUTION INTRAMUSCULAR; INTRAVENOUS at 05:32

## 2020-05-21 RX ADMIN — FENTANYL CITRATE 50 MCG: 50 INJECTION, SOLUTION INTRAMUSCULAR; INTRAVENOUS at 16:26

## 2020-05-21 RX ADMIN — MORPHINE SULFATE 2 MG: 2 INJECTION, SOLUTION INTRAMUSCULAR; INTRAVENOUS at 09:35

## 2020-05-21 RX ADMIN — BUSPIRONE HYDROCHLORIDE 20 MG: 10 TABLET ORAL at 17:55

## 2020-05-21 RX ADMIN — GABAPENTIN 300 MG: 300 CAPSULE ORAL at 21:49

## 2020-05-21 RX ADMIN — QUETIAPINE FUMARATE 100 MG: 100 TABLET ORAL at 21:49

## 2020-05-21 RX ADMIN — METOCLOPRAMIDE 10 MG: 5 INJECTION, SOLUTION INTRAMUSCULAR; INTRAVENOUS at 17:55

## 2020-05-21 RX ADMIN — Medication 5 MG: at 14:59

## 2020-05-21 RX ADMIN — SUCRALFATE 1 G: 1 TABLET ORAL at 17:55

## 2020-05-21 RX ADMIN — MIDAZOLAM HYDROCHLORIDE 2 MG: 1 INJECTION, SOLUTION INTRAMUSCULAR; INTRAVENOUS at 14:28

## 2020-05-21 RX ADMIN — Medication 5 MG: at 15:17

## 2020-05-21 RX ADMIN — PROPOFOL 100 MG: 10 INJECTION, EMULSION INTRAVENOUS at 14:32

## 2020-05-21 RX ADMIN — FENTANYL CITRATE 50 MCG: 50 INJECTION, SOLUTION INTRAMUSCULAR; INTRAVENOUS at 16:33

## 2020-05-21 RX ADMIN — AMITRIPTYLINE HYDROCHLORIDE 25 MG: 25 TABLET, FILM COATED ORAL at 17:55

## 2020-05-21 RX ADMIN — ONDANSETRON 4 MG: 2 INJECTION INTRAMUSCULAR; INTRAVENOUS at 19:01

## 2020-05-21 RX ADMIN — OXYCODONE HYDROCHLORIDE AND ACETAMINOPHEN 1 TABLET: 5; 325 TABLET ORAL at 21:46

## 2020-05-21 RX ADMIN — ENOXAPARIN SODIUM 40 MG: 40 INJECTION SUBCUTANEOUS at 21:51

## 2020-05-21 RX ADMIN — Medication 10 ML: at 10:26

## 2020-05-21 RX ADMIN — SODIUM CHLORIDE, POTASSIUM CHLORIDE, SODIUM LACTATE AND CALCIUM CHLORIDE: 600; 310; 30; 20 INJECTION, SOLUTION INTRAVENOUS at 14:25

## 2020-05-21 RX ADMIN — AMITRIPTYLINE HYDROCHLORIDE 25 MG: 25 TABLET, FILM COATED ORAL at 21:48

## 2020-05-21 RX ADMIN — Medication 10 ML: at 21:47

## 2020-05-21 RX ADMIN — PROPOFOL 50 MG: 10 INJECTION, EMULSION INTRAVENOUS at 15:08

## 2020-05-21 RX ADMIN — Medication 5 MG: at 14:43

## 2020-05-21 RX ADMIN — TRAZODONE HYDROCHLORIDE 100 MG: 100 TABLET ORAL at 21:50

## 2020-05-21 RX ADMIN — MORPHINE SULFATE 2 MG: 2 INJECTION, SOLUTION INTRAMUSCULAR; INTRAVENOUS at 19:01

## 2020-05-21 RX ADMIN — LORAZEPAM 1 MG: 2 INJECTION INTRAMUSCULAR at 10:50

## 2020-05-21 RX ADMIN — METOCLOPRAMIDE 10 MG: 5 INJECTION, SOLUTION INTRAMUSCULAR; INTRAVENOUS at 21:48

## 2020-05-21 RX ADMIN — MORPHINE SULFATE 2 MG: 2 INJECTION, SOLUTION INTRAMUSCULAR; INTRAVENOUS at 05:32

## 2020-05-21 RX ADMIN — LORAZEPAM 1 MG: 2 INJECTION INTRAMUSCULAR at 19:34

## 2020-05-21 RX ADMIN — CLONAZEPAM 0.5 MG: 0.5 TABLET ORAL at 21:50

## 2020-05-21 RX ADMIN — BUSPIRONE HYDROCHLORIDE 20 MG: 10 TABLET ORAL at 21:48

## 2020-05-21 ASSESSMENT — PULMONARY FUNCTION TESTS
PIF_VALUE: 15
PIF_VALUE: 2
PIF_VALUE: 13
PIF_VALUE: 14
PIF_VALUE: 10
PIF_VALUE: 10
PIF_VALUE: 3
PIF_VALUE: 13
PIF_VALUE: 2
PIF_VALUE: 14
PIF_VALUE: 14
PIF_VALUE: 3
PIF_VALUE: 25
PIF_VALUE: 10
PIF_VALUE: 22
PIF_VALUE: 3
PIF_VALUE: 12
PIF_VALUE: 19
PIF_VALUE: 26
PIF_VALUE: 3
PIF_VALUE: 20
PIF_VALUE: 16
PIF_VALUE: 2
PIF_VALUE: 9
PIF_VALUE: 15
PIF_VALUE: 15
PIF_VALUE: 13
PIF_VALUE: 12
PIF_VALUE: 17
PIF_VALUE: 14
PIF_VALUE: 16
PIF_VALUE: 1
PIF_VALUE: 13
PIF_VALUE: 14
PIF_VALUE: 13
PIF_VALUE: 1
PIF_VALUE: 6
PIF_VALUE: 11
PIF_VALUE: 3
PIF_VALUE: 3
PIF_VALUE: 20
PIF_VALUE: 13
PIF_VALUE: 16
PIF_VALUE: 13
PIF_VALUE: 10
PIF_VALUE: 14
PIF_VALUE: 1
PIF_VALUE: 10
PIF_VALUE: 10
PIF_VALUE: 17
PIF_VALUE: 15
PIF_VALUE: 9
PIF_VALUE: 4
PIF_VALUE: 14
PIF_VALUE: 2
PIF_VALUE: 1
PIF_VALUE: 16
PIF_VALUE: 15
PIF_VALUE: 3
PIF_VALUE: 13
PIF_VALUE: 2
PIF_VALUE: 15
PIF_VALUE: 16
PIF_VALUE: 7
PIF_VALUE: 3
PIF_VALUE: 15
PIF_VALUE: 2
PIF_VALUE: 12
PIF_VALUE: 1
PIF_VALUE: 10
PIF_VALUE: 9
PIF_VALUE: 3
PIF_VALUE: 15
PIF_VALUE: 14
PIF_VALUE: 2
PIF_VALUE: 13
PIF_VALUE: 19
PIF_VALUE: 12
PIF_VALUE: 18
PIF_VALUE: 12
PIF_VALUE: 11
PIF_VALUE: 1
PIF_VALUE: 13
PIF_VALUE: 19
PIF_VALUE: 7
PIF_VALUE: 14
PIF_VALUE: 9
PIF_VALUE: 13
PIF_VALUE: 15
PIF_VALUE: 16
PIF_VALUE: 19
PIF_VALUE: 21

## 2020-05-21 ASSESSMENT — PAIN SCALES - GENERAL
PAINLEVEL_OUTOF10: 7
PAINLEVEL_OUTOF10: 9
PAINLEVEL_OUTOF10: 10
PAINLEVEL_OUTOF10: 5
PAINLEVEL_OUTOF10: 8
PAINLEVEL_OUTOF10: 5
PAINLEVEL_OUTOF10: 5
PAINLEVEL_OUTOF10: 8
PAINLEVEL_OUTOF10: 8
PAINLEVEL_OUTOF10: 7
PAINLEVEL_OUTOF10: 8

## 2020-05-21 ASSESSMENT — PAIN DESCRIPTION - LOCATION
LOCATION: ABDOMEN
LOCATION_2: SHOULDER
LOCATION: ABDOMEN
LOCATION: ABDOMEN

## 2020-05-21 ASSESSMENT — PAIN DESCRIPTION - PROGRESSION
CLINICAL_PROGRESSION: GRADUALLY IMPROVING
CLINICAL_PROGRESSION: NOT CHANGED

## 2020-05-21 ASSESSMENT — PAIN DESCRIPTION - INTENSITY: RATING_2: 8

## 2020-05-21 ASSESSMENT — PAIN DESCRIPTION - PAIN TYPE
TYPE: ACUTE PAIN
TYPE: ACUTE PAIN
TYPE: OTHER (COMMENT)
TYPE: CHRONIC PAIN

## 2020-05-21 ASSESSMENT — PAIN DESCRIPTION - ONSET
ONSET: ON-GOING
ONSET_2: SUDDEN
ONSET: ON-GOING

## 2020-05-21 ASSESSMENT — PAIN DESCRIPTION - ORIENTATION
ORIENTATION_2: RIGHT
ORIENTATION: RIGHT
ORIENTATION: RIGHT
ORIENTATION: RIGHT;LEFT;MID

## 2020-05-21 ASSESSMENT — LIFESTYLE VARIABLES: SMOKING_STATUS: 1

## 2020-05-21 ASSESSMENT — PAIN - FUNCTIONAL ASSESSMENT
PAIN_FUNCTIONAL_ASSESSMENT: PREVENTS OR INTERFERES SOME ACTIVE ACTIVITIES AND ADLS
PAIN_FUNCTIONAL_ASSESSMENT: 0-10

## 2020-05-21 ASSESSMENT — PAIN DESCRIPTION - FREQUENCY
FREQUENCY: CONTINUOUS
FREQUENCY: CONTINUOUS

## 2020-05-21 ASSESSMENT — COPD QUESTIONNAIRES
CAT_SEVERITY: MODERATE
CAT_SEVERITY: NO INTERVAL CHANGE

## 2020-05-21 ASSESSMENT — PAIN DESCRIPTION - DESCRIPTORS: DESCRIPTORS: ACHING;CONSTANT;DISCOMFORT

## 2020-05-21 NOTE — ANESTHESIA PRE PROCEDURE
mg  5 mg Intravenous Q10 Min PRN Katia Marshall MD        pantoprazole (PROTONIX) injection 40 mg  40 mg Intravenous BID Antoine Houser MD   40 mg at 05/21/20 1026    And    sodium chloride (PF) 0.9 % injection 10 mL  10 mL Intravenous BID Antoine Houser MD   10 mL at 05/21/20 1026    sucralfate (CARAFATE) tablet 1 g  1 g Oral 4 times per day MARGOTH Cho - CNP   1 g at 05/20/20 2333    metoprolol succinate (TOPROL XL) extended release tablet 25 mg  25 mg Oral Daily Aristides Tabares MD   25 mg at 05/20/20 1012    [Held by provider] furosemide (LASIX) tablet 20 mg  20 mg Oral Daily Aristides Tabares MD   20 mg at 05/17/20 0742    bisacodyl (DULCOLAX) suppository 10 mg  10 mg Rectal Daily No Martinez DO   10 mg at 05/20/20 1012    LORazepam (ATIVAN) injection 1 mg  1 mg Intravenous Q4H PRN Priya Rojas APRN - CNP   1 mg at 05/21/20 1050    morphine (PF) injection 2 mg  2 mg Intravenous Q3H PRN Obdulio Cid, APRN - CNP   2 mg at 05/21/20 0935    clonazePAM (KLONOPIN) tablet 0.5 mg  0.5 mg Oral BID Jacqualyn Albino, APRN - NP   0.5 mg at 05/20/20 2333    QUEtiapine (SEROQUEL) tablet 100 mg  100 mg Oral Nightly Jacqualyn Albino, APRN - NP   100 mg at 05/20/20 2332    traZODone (DESYREL) tablet 100 mg  100 mg Oral Nightly Jacqualyn Albino, APRN - NP   100 mg at 05/20/20 2358    tamsulosin (FLOMAX) capsule 0.4 mg  0.4 mg Oral Daily Rishi [address], APRN - NP   0.4 mg at 05/20/20 1012    acetaminophen (TYLENOL) tablet 650 mg  650 mg Oral Q4H PRN Rishi Mary, APRN - NP   650 mg at 05/13/20 1054    metoclopramide (REGLAN) injection 10 mg  10 mg Intravenous Q6H Rishi Mary, APRN - NP   10 mg at 05/21/20 1026    sodium chloride flush 0.9 % injection 10 mL  10 mL Intravenous 2 times per day Rishi Hernandez, APRN - NP   10 mL at 05/20/20 2334    sodium chloride flush 0.9 % injection 10 mL  10 mL Intravenous PRN Rishi Hernandez, APRN - NP   10 mL at 05/20/20 4813    potassium chloride (KLOR-CON M) extended release tablet 40 mEq  40 mEq Oral PRN Kirstin Reach, APRN - NP        Or    potassium bicarb-citric acid (EFFER-K) effervescent tablet 40 mEq  40 mEq Oral PRN Kirstin Reach, APRN - NP        Or    potassium chloride 10 mEq/100 mL IVPB (Peripheral Line)  10 mEq Intravenous PRN Kirstin Reach, APRN - NP        magnesium sulfate 1 g in dextrose 5% 100 mL IVPB  1 g Intravenous PRN Kirstin Reach, APRN - NP        nicotine (NICODERM CQ) 21 MG/24HR 1 patch  1 patch Transdermal Daily PRN Kirstin Reach, APRN - NP   1 patch at 05/15/20 1954    dextrose 5 % and 0.45 % sodium chloride infusion   Intravenous Continuous Ghazal Dnucan MD 20 mL/hr at 05/16/20 1829      amitriptyline (ELAVIL) tablet 25 mg  25 mg Oral TID Ghazal Duncan MD   25 mg at 05/20/20 2333    busPIRone (BUSPAR) tablet 20 mg  20 mg Oral TID Ghazal Duncan MD   20 mg at 05/20/20 2332    escitalopram (LEXAPRO) tablet 20 mg  20 mg Oral Daily Ghazal Duncan MD   20 mg at 05/20/20 1012    gabapentin (NEURONTIN) capsule 300 mg  300 mg Oral TID Ghazal Duncan MD   300 mg at 05/20/20 2333       Allergies:     Allergies   Allergen Reactions    Prednisone Anxiety    Compazine [Prochlorperazine Maleate]     Penicillin V Potassium     Penicillins Other (See Comments)     shock       Problem List:    Patient Active Problem List   Diagnosis Code    Frequency of urination R35.0    Severe sepsis ([address] A41.9, R65.20    Back pain M54.9    GERD (gastroesophageal reflux disease) K21.9    MVP (mitral valve prolapse) I34.1    Depression F32.9    Anxiety F41.9    Urine retention R33.9    Acute kidney injury ([address] N17.9    Esophageal dysphagia R13.10    Paraesophageal hernia K44.9    History of repair of hiatal hernia Z98.890, Z87.19    Aspiration pneumonia (HCC) J69.0    Respiratory failure (HCC) J96.90    Centrilobular emphysema (HCC) J43.2    Atelectasis J98.11    Pleural effusion J90    Esophageal dilatation K22.8    Upper GI bleeding K92.2    Acute on chronic blood loss anemia D62    Shock ([facility] R57.9    Fever R50.9    Acute postoperative respiratory insufficiency J95.89    Critical illness polyneuropathy (HCC) G62.81    Gastric outlet obstruction K31.1    Recurrent UTI N39.0    Tracheostomy in place ([facility] Z93.0    H/O gastric ulcer Z87.19    Hyperlipidemia E78.5    Hypertension I10    Tobacco abuse Z72.0    Chronic respiratory failure with hypoxia (HCC) J96.11    Status post insertion of percutaneous endoscopic gastrostomy (PEG) tube (HCC) Z93.1    S/P Nissen fundoplication (without gastrostomy tube) procedure Z98.890    Paralytic ileus (HCC) K56.0    Elevated liver enzymes R74.8       Past Medical History:        Diagnosis Date    Anxiety     Arthritis     Back pain     Bronchitis     Caffeine use     8 coffee / day    Carpal tunnel syndrome     Cataracts, bilateral     Constipation     Depression     Diarrhea     GERD (gastroesophageal reflux disease)     H/O gastric ulcer     Hyperlipidemia     Hypertension     Mumps     MVP (mitral valve prolapse)     Dr. Nancy Amezcua in May 2019    Recurrent UTI     Dr. Joselin Shrestha    Sinusitis     Tracheostomy in place [facility])     Ulcerative esophagitis     Wellness examination     Dr. Sj Naylor seen in Jan 2020       Past Surgical History:        Procedure Laterality Date    APPENDECTOMY      CARPAL TUNNEL RELEASE      COLONOSCOPY      CYSTOSCOPY      EGD  3/17/2020         EYE SURGERY      patricia IOL    GASTRIC FUNDOPLICATION N/A 8/57/6702    XI LAPAROSCOPIC ROBOTIC HIATAL HERNIA REPAIR, CHERYL FUNDOPLICATION performed by Kolton Treadwell MD at [facility] N/A 3/27/2020    EGD [facility] [address] C[facility] POWER PICC TRIPLE  3/4/2020         HIATAL HERNIA REPAIR  02/24/2020    LAPAROSCOPIC ROBOTIC HIATAL HERNIA REPAIR, CHERYL FUNDOPLICATION     HYSTERECTOMY      Abdominal    JOINT REPLACEMENT Right     right hip    OVARY

## 2020-05-21 NOTE — PLAN OF CARE
Problem: Pain:  Goal: Pain level will decrease  Description: Pain level will decrease  5/21/2020 1648 by Cassi Llanos RN  Outcome: Ongoing  5/21/2020 0429 by Dillon Bianchi RN  Outcome: Ongoing  Goal: Control of acute pain  Description: Control of acute pain  5/21/2020 1648 by Cassi Llanos RN  Outcome: Ongoing  5/21/2020 0429 by Dillon Bianchi RN  Outcome: Ongoing  Goal: Control of chronic pain  Description: Control of chronic pain  5/21/2020 1648 by Cassi Llanos RN  Outcome: Ongoing  5/21/2020 0429 by Dillon Bianchi RN  Outcome: Ongoing     Problem: Falls - Risk of:  Goal: Will remain free from falls  Description: Will remain free from falls  5/21/2020 1648 by Cassi Llanos RN  Outcome: Ongoing  5/21/2020 0429 by Dillon Bianchi RN  Outcome: Ongoing  Goal: Absence of physical injury  Description: Absence of physical injury  5/21/2020 1648 by Cassi Llanos RN  Outcome: Ongoing  5/21/2020 0429 by Dillon Bianchi RN  Outcome: Ongoing     Problem: Nutrition  Goal: Optimal nutrition therapy  5/21/2020 1648 by Cassi Llanos RN  Outcome: Ongoing  5/21/2020 1421 by Alyce Jin RD, LD  Outcome: Ongoing  Note: Nutrition Problem: Inadequate oral intake  Intervention: Food and/or Nutrient Delivery: Continue NPO, Modify current Parenteral Nutrition(Restart diet as able )  Nutritional Goals: meet % of estimated nutrition needs with nutrition support/oral diet   5/21/2020 0429 by Dillon Bianchi RN  Outcome: Ongoing     Problem: Musculor/Skeletal Functional Status  Goal: Highest potential functional level  5/21/2020 1648 by Cassi Llanos RN  Outcome: Ongoing  5/21/2020 0429 by Dillon Bianchi RN  Outcome: Ongoing     Problem: Infection:  Goal: Will remain free from infection  Description: Will remain free from infection  5/21/2020 1648 by Cassi Llanos RN  Outcome: Ongoing  5/21/2020 0429 by Dillon Bianchi RN  Outcome: Ongoing     Problem: Safety:  Goal: Free from accidental physical injury  Description: Free from accidental

## 2020-05-21 NOTE — PROGRESS NOTES
Surgery Progress Note            PATIENT NAME: Richard Waldrop     TODAY'S DATE: 5/21/2020, 7:18 AM    SUBJECTIVE:    Pt seen and examined. Denies N/V this morning but has had some intermittent nausea over last 24 hours. Reports periumbilical \"soreness. \" Passing some flatus and 2x BM yesterday per RN. NPO for ERCP this morning. OBJECTIVE:   VITALS:  BP (!) 117/55   Pulse 78   Temp 98.1 °F (36.7 °C) (Oral)   Resp 18   Ht 5' 3\" (1.6 m)   Wt 178 lb 4 oz (80.9 kg)   SpO2 93%   BMI 31.58 kg/m²      INTAKE/OUTPUT:      Intake/Output Summary (Last 24 hours) at 5/21/2020 4821  Last data filed at 5/21/2020 0606  Gross per 24 hour   Intake 1641 ml   Output 550 ml   Net 1091 ml       PHYSICAL EXAM  General Appearance: in no acute distress  Skin:  Warm and dry   Head/face:  NCAT and sclera anicteric   Lungs:  Respirations even and unlabored   Heart:  Heart regular rate and rhythm  Abdomen:  Soft, non-distended, non-tender. PEG tube intact; no erythema or drainage at tube site.    Extremities: no edema and no cyanosis          Data:  CBC with Differential:    Lab Results   Component Value Date    WBC 7.0 05/21/2020    RBC 3.29 05/21/2020    HGB 8.8 05/21/2020    HCT 31.0 05/21/2020     05/21/2020    MCV 94.2 05/21/2020    MCH 26.7 05/21/2020    MCHC 28.4 05/21/2020    RDW 18.5 05/21/2020    NRBC 1 05/12/2020    LYMPHOPCT 28 05/21/2020    MONOPCT 12 05/21/2020    BASOPCT 0 05/21/2020    MONOSABS 0.85 05/21/2020    LYMPHSABS 1.93 05/21/2020    EOSABS 0.23 05/21/2020    BASOSABS 0.03 05/21/2020    DIFFTYPE NOT REPORTED 05/21/2020     BMP:    Lab Results   Component Value Date     05/21/2020    K 4.3 05/21/2020     05/21/2020    CO2 21 05/21/2020    BUN 25 05/21/2020    LABALBU 2.8 05/21/2020    CREATININE 0.80 05/21/2020    CALCIUM 9.2 05/21/2020    GFRAA >60 05/21/2020    LABGLOM >60 05/21/2020    GLUCOSE 110 05/21/2020       Radiology Review:    EXAMINATION:   RIGHT UPPER QUADRANT ULTRASOUND

## 2020-05-21 NOTE — PROGRESS NOTES
Occupational Therapy    Occupational Therapy Not Seen Note    DATE: 2020  Name: Anuj Ram  : 1945  MRN: [id number]    Patient not available for Occupational Therapy due to:    Surgery/Procedure: Pt OOR and at EGD this pm.    Next Scheduled Treatment: Attempt on  as appropriate.     Electronically signed by Megan Morales OT on 2020 at 2:10 PM

## 2020-05-21 NOTE — PLAN OF CARE
Problem: Pain:  Description: Pain management should include both nonpharmacologic and pharmacologic interventions.   Goal: Pain level will decrease  Description: Pain level will decrease  Outcome: Ongoing  Goal: Control of acute pain  Description: Control of acute pain  Outcome: Ongoing  Goal: Control of chronic pain  Description: Control of chronic pain  Outcome: Ongoing     Problem: Falls - Risk of:  Goal: Will remain free from falls  Description: Will remain free from falls  Outcome: Ongoing  Goal: Absence of physical injury  Description: Absence of physical injury  Outcome: Ongoing     Problem: Nutrition  Goal: Optimal nutrition therapy  Outcome: Ongoing     Problem: Musculor/Skeletal Functional Status  Goal: Highest potential functional level  Outcome: Ongoing     Problem: Infection:  Goal: Will remain free from infection  Description: Will remain free from infection  Outcome: Ongoing     Problem: Safety:  Goal: Free from accidental physical injury  Description: Free from accidental physical injury  Outcome: Ongoing  Goal: Free from intentional harm  Description: Free from intentional harm  Outcome: Ongoing     Problem: Daily Care:  Goal: Daily care needs are met  Description: Daily care needs are met  Outcome: Ongoing     Problem: Skin Integrity:  Goal: Skin integrity will stabilize  Description: Skin integrity will stabilize  Outcome: Ongoing     Problem: Discharge Planning:  Goal: Patients continuum of care needs are met  Description: Patients continuum of care needs are met  Outcome: Ongoing     Problem: MECHANICAL VENTILATION  Goal: Tracheostomy will be managed safely  5/21/2020 0429 by Daniel Mendoza RN  Outcome: Ongoing  5/20/2020 1743 by Dong Kinsey RCP  Outcome: Ongoing     Problem: ABCDS Injury Assessment  Goal: Absence of physical injury  Outcome: Ongoing

## 2020-05-21 NOTE — ANESTHESIA POSTPROCEDURE EVALUATION
Department of Anesthesiology  Postprocedure Note    Patient: Zahida Smith  MRN: [id number]  YOB: 1945  Date of evaluation: 5/21/2020  Time:  7:55 PM     Procedure Summary     Date:  05/21/20 Room / Location:  [facility] / [address] [facility]    Anesthesia Start:  1425 Anesthesia Stop:  1606    Procedures:       ** CASE IN OR W/ GI STAFF - ERCP ENDOSCOPIC RETROGRADE CHOLANGIOPANCREATOGRAPHY (N/A )      ** CASE IN OR WITY GI STAFF** ERCP STENT INSERTION      ** CASE IN OR WITH GI STAFF**ERCP SPHINCTER/PAPILLOTOMY      ** CASE IN OR WITH GI STAFF**ERCP DILATION BALLOON Diagnosis:  (BILIARY OBSTRUCTION)    Surgeon:  Brennen Mendez MD Responsible Provider:  Chele Mehta MD    Anesthesia Type:  general ASA Status:  4          Anesthesia Type: general    Alhaji Phase I: Alhaji Score: 9    Alhaji Phase II: Alhaji Score: 9    Last vitals: Reviewed and per EMR flowsheets.        Anesthesia Post Evaluation    Patient location during evaluation: PACU  Patient participation: complete - patient participated  Level of consciousness: awake and alert  Pain score: 5 (same as preop)  Nausea & Vomiting: no nausea  Cardiovascular status: hemodynamically stable  Respiratory status: room air  Hydration status: euvolemic

## 2020-05-21 NOTE — ANESTHESIA PRE PROCEDURE
Department of Anesthesiology  Preprocedure Note       Name:  Bismark Montes   Age:  76 y.o.  :  1945                                          MRN:  [id number]         Date:  2020      Surgeon: Obdulia Miguel):  Kody Mahmood MD    Procedure:  CASE IN OR WITH GI STAFF**\* EGD ESOPHAGOGASTRODUODENOSCOPY (N/A )    Department of Anesthesiology  Pre-Anesthesia Evaluation/Consultation         Name:  Bismark Montes                                         Age:  76 y.o.   MRN:  [id number]             Medications  Current Facility-Administered Medications   Medication Dose Route Frequency Provider Last Rate Last Dose    [START ON 2020] meropenem (MERREM) 1 g in sodium chloride 0.9 % 100 mL IVPB (mini-bag)  1 g Intravenous Q8H Lavell Foss MD        [facility] injection 4 mg  4 mg Intravenous Q6H PRN Júnior Mandujano DO   4 mg at 20 2502    PN-Adult 2-in-1 Central Line (Standard)   Intravenous Continuous TPN Librado Conroy MD 60 mL/hr at 20 1746      enoxaparin (LOVENOX) injection 40 mg  40 mg Subcutaneous Daily MARGOTH Kumar - CNP        fentaNYL (SUBLIMAZE) injection 25 mcg  25 mcg Intravenous Q5 Min PRN Marina Drummond MD        fentaNYL (SUBLIMAZE) injection 50 mcg  50 mcg Intravenous Q5 Min PRN Marina Drummond MD        fentaNYL (SUBLIMAZE) injection 25 mcg  25 mcg Intravenous Q5 Min PRN Marina Drummond MD        fentaNYL (SUBLIMAZE) injection 50 mcg  50 mcg Intravenous Q5 Min PRN Marina Drummond MD        oxyCODONE-acetaminophen (PERCOCET) 5-325 MG per tablet 1 tablet  1 tablet Oral Q4H PRN Marina Drummond MD   1 tablet at 20 1301    labetalol (NORMODYNE;TRANDATE) injection syringe 5 mg  5 mg Intravenous Q10 Min PRN Marina Drummond MD        hydrALAZINE (APRESOLINE) injection 5 mg  5 mg Intravenous Q10 Min PRN Marina Drummond MD        pantoprazole (PROTONIX) injection 40 mg  40 mg Intravenous BID Antoine Houser MD   40 mg at 20 1026    And    sodium 4 mg at 05/20/20 3064    PN-Adult 2-in-1 Central Line (Standard)   Intravenous Continuous TPN Tatyana Mckenzie MD 60 mL/hr at 05/20/20 1746      enoxaparin (LOVENOX) injection 40 mg  40 mg Subcutaneous Daily MARGOTH Kumar CNP        fentaNYL (SUBLIMAZE) injection 25 mcg  25 mcg Intravenous Q5 Min PRN Rm Haynes MD        fentaNYL (SUBLIMAZE) injection 50 mcg  50 mcg Intravenous Q5 Min PRN Rm Haynes MD        fentaNYL (SUBLIMAZE) injection 25 mcg  25 mcg Intravenous Q5 Min PRN Rm Haynes MD        fentaNYL (SUBLIMAZE) injection 50 mcg  50 mcg Intravenous Q5 Min PRN Rm Haynes MD        oxyCODONE-acetaminophen (PERCOCET) 5-325 MG per tablet 1 tablet  1 tablet Oral Q4H PRN Rm Haynes MD   1 tablet at 05/20/20 1301    labetalol (NORMODYNE;TRANDATE) injection syringe 5 mg  5 mg Intravenous Q10 Min PRN Rm Haynes MD        hydrALAZINE (APRESOLINE) injection 5 mg  5 mg Intravenous Q10 Min PRN Rm Haynes MD        pantoprazole (PROTONIX) injection 40 mg  40 mg Intravenous BID Antoine Houser MD   40 mg at 05/21/20 1026    And    sodium chloride (PF) 0.9 % injection 10 mL  10 mL Intravenous BID Antoine Houser MD   10 mL at 05/21/20 1026    sucralfate (CARAFATE) tablet 1 g  1 g Oral 4 times per day MARGOTH Young CNP   1 g at 05/20/20 2333    metoprolol succinate (TOPROL XL) extended release tablet 25 mg  25 mg Oral Daily Tatyana Mckenzie MD   25 mg at 05/20/20 1012    [Held by provider] furosemide (LASIX) tablet 20 mg  20 mg Oral Daily Tatyana Mckenzie MD   20 mg at 05/17/20 0742    bisacodyl (DULCOLAX) suppository 10 mg  10 mg Rectal Daily No Martinez DO   10 mg at 05/20/20 1012    LORazepam (ATIVAN) injection 1 mg  1 mg Intravenous Q4H PRN MARGOTH Bose CNP   1 mg at 05/21/20 1050    morphine (PF) injection 2 mg  2 mg Intravenous Q3H PRN MARGOTH Cueto CNP   2 mg at 05/21/20 0935    clonazePAM (KLONOPIN) tablet 0.5 mg  0.5

## 2020-05-21 NOTE — OP NOTE
Operative Note        ERCP      Patient:   Nelson Reilly   :    1945  Acc#:    [id number]   Referring/PCP: Jean Calvin MD  Date:     2020   Facility:                       [facility]  Endoscopist: MAURICIO Peña. FACP,FACG  Procedure: ERCP with  cholangiogram  , sphincterotomy, balloon sweep, stent placement, pancreatogram, fluoroscopy        Indication: CBD stone seen on MRCP        pre op diagnosis: see indication  Post op diagnosis: see findings  Complications: none, no blood loss       Anesthesia:  General     Description of Procedure:  Prior to the procedure, a history and physical exam was performed and informed consent was obtained. The risks were discussed including pancreatitis, bleeding, and perforation. The patient was counseled at length about the risks of jhoan Covid-19 during their perioperative period and any recovery window from their procedure. The patient was made aware that jhoan Covid-19  may worsen their prognosis for recovering from their procedure  and lend to a higher morbidity and/or mortality risk. All material risks, benefits, and reasonable alternatives including postponing the procedure were discussed. The patient does wish to proceed with the procedure at this time. After the patient was placed in the prone position eved, the therapeutic duodenoscope scope was inserted into the mouth and advanced to the second portion of the duodenum allowing the papilla to be visualized. Using the a wire guided approach with the sphincterotome, the CBD was cannulated and a cholangiogram was performed.         Findings:  The procedure was very limited, as per anesthesia could not put the patient in a prone position she has to be on her left side because of her trach, which makes the fluoroscopy picture especially with her deformities and anatomy very difficult, in addition to that she was breathing very heavy as she is not under general anesthesia, for which the appearance of the fluoroscopy was very difficult to read. Initial cholangiogram revealed no filling defect but the distal part of the CBD was very narrowed and strictured, the proximal part was dilated up to 9 mm - 10 mm, but curved and deformed, at first the intrahepatic ducts would not fill I have to put the balloon and inflated then I could see the intrahepatic ducts, we swept the balloon couple 3 times, no stone came out and it was difficult to push the balloon through the distal one third of the CBD which looked strictured for which I went ahead and placed the stent, 7 [place], 9 cm with that we have very good drainage. Before I could cannulate the CBD the wire would go across the spine indicating that I was falling into the pancreatic duct, we injected the pancreatic duct once and it felt all the way to the tail. \  Post sedation note : The patient's SPO2 remained above 90% throughout the procedure. the vital signs remained stable , and no immediate complication form the procedure noted, patient will be ready for d/c when criteria is met .     Plan:    Patient supposed to have surgery tomorrow  , With cholecystectomy  We will follow clinically and 4 weeks as an outpatient we can remove the stent and have another cholangiogram with better circumstances with the patient would be under general anesthesia with an ET tube instead of the trach, and we can place her on her prone position    Electronically signed by   Manish Wiley   on 5/21/2020 at 5:10 PM

## 2020-05-21 NOTE — PROGRESS NOTES
[facility]    Progress Note    5/21/2020    3:43 PM    Name:   Ada Barba  MRN:     [id number]     Acct:      [de-identified]   Room:   [address] [place] [address] Day:  5  Admit Date:  5/12/2020  3:19 PM    PCP:   Luh Wyman MD  Code Status:  Full Code    Subjective:     C/C: Abdominal Pain    Patient Active Problem List   Diagnosis    Frequency of urination    Severe sepsis ([address]    Back pain    GERD (gastroesophageal reflux disease)    MVP (mitral valve prolapse)    Depression    Anxiety    Urine retention    Acute kidney injury ([address]    Esophageal dysphagia    Paraesophageal hernia    History of repair of hiatal hernia    Aspiration pneumonia ([address]    Respiratory failure ([address]    Centrilobular emphysema ([address]    Atelectasis    Pleural effusion    Esophageal dilatation    Upper GI bleeding    Acute on chronic blood loss anemia    Shock ([address]    Fever    Acute postoperative respiratory insufficiency    Critical illness polyneuropathy ([address]    Gastric outlet obstruction    Recurrent UTI    Tracheostomy in place ([address]    H/O gastric ulcer    Hyperlipidemia    Hypertension    Tobacco abuse    Chronic respiratory failure with hypoxia ([address]    Status post insertion of percutaneous endoscopic gastrostomy (PEG) tube ([address]    S/P Nissen fundoplication (without gastrostomy tube) procedure    Paralytic ileus ([address]    Elevated liver enzymes     Interval History Status: not changed. Patient was seen and examined at bedside. Patient had intermittent nausea over night. Also has abdominal soreness. No new complaint. No fever or chills. No chest pain or shortness of breath. Patient feels anxious.     Brief History:     Per records:     Cynthia RAMÍREZYXSGT QA a 76 y.o. Non-/non  female who presents with No chief complaint on file.   and is admitted to the hospital for the management of SBO  Patient transferred to Current Meds:   Scheduled Meds:    [START ON 5/22/2020] meropenem  1 g Intravenous Q8H    enoxaparin  40 mg Subcutaneous Daily    pantoprazole  40 mg Intravenous BID    And    sodium chloride (PF)  10 mL Intravenous BID    sucralfate  1 g Oral 4 times per day    metoprolol succinate  25 mg Oral Daily    [Held by provider] furosemide  20 mg Oral Daily    bisacodyl  10 mg Rectal Daily    clonazePAM  0.5 mg Oral BID    QUEtiapine  100 mg Oral Nightly    traZODone  100 mg Oral Nightly    tamsulosin  0.4 mg Oral Daily    metoclopramide  10 mg Intravenous Q6H    sodium chloride flush  10 mL Intravenous 2 times per day    amitriptyline  25 mg Oral TID    busPIRone  20 mg Oral TID    escitalopram  20 mg Oral Daily    gabapentin  300 mg Oral TID     Continuous Infusions:    PN-Adult 2-in-1 [place] (Standard)      PN-Adult 2-in-1 [employer] (Standard) 60 mL/hr at 05/20/20 1746    dextrose 5 % and 0.45 % NaCl 20 mL/hr at 05/16/20 1829     PRN Meds: ondansetron, fentanNYL, fentanNYL, fentanNYL, fentanNYL, oxyCODONE-acetaminophen, labetalol, hydrALAZINE, LORazepam, morphine, acetaminophen, sodium chloride flush, potassium chloride **OR** potassium alternative oral replacement **OR** potassium chloride, magnesium sulfate, nicotine    Data:     Past Medical History:   has a past medical history of Anxiety, Arthritis, Back pain, Bronchitis, Caffeine use, Carpal tunnel syndrome, Cataracts, bilateral, Constipation, Depression, Diarrhea, GERD (gastroesophageal reflux disease), H/O gastric ulcer, Hyperlipidemia, Hypertension, Mumps, MVP (mitral valve prolapse), Recurrent UTI, Sinusitis, Tracheostomy in place [facility]), Ulcerative esophagitis, and Wellness examination. Social History:   reports that she has been smoking cigarettes. She has a 50.00 pack-year smoking history. She has never used smokeless tobacco. She reports that she does not drink alcohol or use drugs.      Family History:   Family History place and time and normal affect  Lungs:  clear to auscultation bilaterally, normal effort  Heart:  regular rate and rhythm, no murmur  Abdomen:  soft, nontender, nondistended, normal bowel sounds, no masses, hepatomegaly, splenomegaly  Extremities:  no edema, redness, tenderness in the calves  Skin:  no gross lesions, rashes, induration    Assessment:        Hospital Problems           Last Modified POA    * (Principal) Gastric outlet obstruction 5/16/2020 Yes    MVP (mitral valve prolapse) 5/12/2020 Yes    Centrilobular emphysema ([address] 5/12/2020 Yes    Fever 5/14/2020 Yes    Recurrent UTI 5/12/2020 Yes    Overview Signed 5/12/2020  5:22 PM by MD Dr. Edelmira Calzada         Tracheostomy in place [facility]) 5/12/2020 Yes    H/O gastric ulcer 5/12/2020 Yes    Hyperlipidemia 5/12/2020 Yes    Hypertension 5/12/2020 Yes    Tobacco abuse 5/12/2020 Yes    Chronic respiratory failure with hypoxia ([address] 5/12/2020 Yes    Status post insertion of percutaneous endoscopic gastrostomy (PEG) tube ([address] 5/12/2020 Yes    S/P Nissen fundoplication (without gastrostomy tube) procedure 5/12/2020 Yes    Paralytic ileus ([address] 5/17/2020 Yes    Elevated liver enzymes 5/17/2020 Yes          Plan:        1. Gastric outlet obstruction/pylorus paralysis -status post EGD. EGD showed severe gastritis and pyloric stenosis. Stenosis was followed but symptoms came back today. ERCP planned for tomorrow. Depending on ERCP results surgery may intervene on Friday. 2. Recurrent UTI -no evidence of current UTI. 3. CBD dilation without intrabiliary dilatation -ERCP planned. 4. Chronic respiratory failure with hypoxia -patient has trach in place. Continue trach care and respiratory therapy. Continue supplemental oxygen. 5. Urinary retention -patient refusing intermittent cath. Urology consult recommended to keep the [place] in. Flomax is added to the regimen. 6. COPD -compensated. Continue current medical therapy.   7. History of bipolar

## 2020-05-21 NOTE — PROGRESS NOTES
laparoscopic robotic hiatal hernia repair and fundoplication last month on 02/24/2020 and postoperatively she developed hypoxia. CT chest done on 02/26/2020 showed bibasilar atelectasis/consolidation and patchy area of infiltrate in the right upper lobe. Patient was discharged on home oxygen on 2-28.     She presented back to [facility] ER on 2-29 with worsening shortness of breath, dyspnea as well as an episode of dark red emesis. Her SaO2 was 60 % in the ED. There was concern of aspiration. CTA chest done was negative for PE, showed bilateral pleural effusions and severe esophageal dilatation, surgery was consulted. Patient was transferred to [facility] for further management.     Patient was started on noninvasive ventilation and then intubated and admitted to the ICU. She improved, was extubated on 3-7 to high flow O2. Pt was transferred out of the ICU on 3-13.     On 3-14 pts hgb dropped to 6.3 and she developed coffee-ground then bright red emesis, and was again transferred back to medical ICU.     General surgery performed endoscopy on 3/17 which showed an ulcer at the GE junction and mild gastritis with no active bleeding. After the procedure she developed acute respiratory distress and a fever of 39.8. She was emergently intubated and started on Meropenem and Vancomycin.      ID is consulted for antibiotic management. Patient was continued on meropenem. Vancomycin D/C     S/P trach and PEG placement 3/27     Flat plate of abdomen 1-17-43 with nonobstructive gas pattern with retained enteric contrast in the colon  On TPN. Patient could not tolerate tube feeds. Has superficial venous clot Rt Mid forearm to antecubital      Tracheal exchange in OR done on 4-2-20. Patient transferred to [facility] on 4-6-20. Transferred back to [facility] on 5-12-20 because of persistent vomiting, abdominal pain. Concern for possible SBO. Pt NPO on TPN.    5-15 Passed swallow study to Dys III w/thickened liquids  Per surgery, pt  UPPER GASTROINTESTINAL ENDOSCOPY  5/18/2020    EGD DILATION BALLOON performed by Lula Baumgarten, MD at [facility]       Medications:      enoxaparin  40 mg Subcutaneous Daily    pantoprazole  40 mg Intravenous BID    And    sodium chloride (PF)  10 mL Intravenous BID    sucralfate  1 g Oral 4 times per day    metoprolol succinate  25 mg Oral Daily    [Held by provider] furosemide  20 mg Oral Daily    bisacodyl  10 mg Rectal Daily    clonazePAM  0.5 mg Oral BID    QUEtiapine  100 mg Oral Nightly    traZODone  100 mg Oral Nightly    tamsulosin  0.4 mg Oral Daily    metoclopramide  10 mg Intravenous Q6H    sodium chloride flush  10 mL Intravenous 2 times per day    amitriptyline  25 mg Oral TID    busPIRone  20 mg Oral TID    escitalopram  20 mg Oral Daily    gabapentin  300 mg Oral TID       Social History:     Social History     Socioeconomic History    Marital status:       Spouse name: Not on file    Number of children: 2    Years of education: Not on file    Highest education level: Not on file   Occupational History    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   Social Needs    Financial resource strain: Not on file    Food insecurity     Worry: Not on file     Inability: Not on file   [employer] needs     Medical: Not on file     Non-medical: Not on file   Tobacco Use    Smoking status: Current Every Day Smoker     Packs/day: 1.00     Years: 50.00     Pack years: 50.00     Types: Cigarettes    Smokeless tobacco: Never Used   Substance and Sexual Activity    Alcohol use: No    Drug use: No    Sexual activity: Never   Lifestyle    Physical activity     Days per week: Not on file     Minutes per session: Not on file    Stress: Not on file   Relationships    Social connections     Talks on phone: Not on file     Gets together: Not on file     Attends Gnosticist service: Not on file     Active member of club or organization: Not on file     Attends meetings of clubs or organizations: Not apparent distress  Head:  Normocephalic, no trauma  Eyes: Pupils equal, round, reactive to light and accommodation; extraocular movements intact; sclera anicteric; conjunctivae pink. No embolic phenomena. ENT: Oropharynx clear, without erythema, exudate, or thrush. No tenderness of sinuses. Mouth/throat: mucosa pink and moist. No lesions. Dentition in good repair. Neck:Supple, without lymphadenopathy. Thyroid normal, No bruits. Tracheostomy in place. Pulmonary/Chest: Clear to auscultation, without wheezes, rales, or rhonchi. No dullness to percussion. Cardiovascular: Regular rate and rhythm without murmurs, rubs, or gallops. Abdomen: Soft, non tender. Bowel sounds normal. No organomegaly. PEG in place. Surgical incisions without inflammation. All four Extremities: No cyanosis, clubbing, edema, or effusions. Neurologic: No gross sensory or motor deficits. Skin: Warm and dry with good turgor. No signs of peripheral arterial or venous insufficiency. No ulcerations. No open wounds.         Medical Decision Making -Laboratory:   I have independently reviewed/ordered the following labs:    CBC with Differential:   Recent Labs     05/20/20 0622 05/21/20  0450   WBC 7.7 7.0   HGB 9.8* 8.8*   HCT 33.8* 31.0*    266   LYMPHOPCT 25 28   MONOPCT 12 12     BMP:   Recent Labs     05/20/20  0622 05/21/20  0450    139   K 4.1 4.3    104   CO2 21 21   BUN 27* 25*   CREATININE 0.75 0.80   MG 1.9  --      Hepatic Function Panel:   Recent Labs     05/20/20 0622 05/21/20  0450   PROT 6.1* 5.9*   LABALBU 2.7* 2.8*   BILIDIR 0.13  --    IBILI 0.17  --    BILITOT 0.30 0.20*   ALKPHOS 162* 147*   ALT 80* 61*   AST 51* 31     No results for input(s): RPR in the last 72 hours. No results for input(s): HIV in the last 72 hours. No results for input(s): BC in the last 72 hours.   Lab Results   Component Value Date    MUCUS NOT REPORTED 03/17/2020    RBC 3.29 05/21/2020    TRICHOMONAS NOT REPORTED 03/17/2020    WBC

## 2020-05-22 ENCOUNTER — ANESTHESIA (OUTPATIENT)
Dept: OPERATING ROOM | Age: 75
DRG: 326 | End: 2020-05-22
Payer: MEDICARE

## 2020-05-22 VITALS — TEMPERATURE: 96.3 F | OXYGEN SATURATION: 100 % | DIASTOLIC BLOOD PRESSURE: 62 MMHG | SYSTOLIC BLOOD PRESSURE: 89 MMHG

## 2020-05-22 PROBLEM — R13.10 DYSPHAGIA: Status: ACTIVE | Noted: 2020-02-04

## 2020-05-22 LAB
ABSOLUTE EOS #: 0.24 K/UL (ref 0–0.44)
ABSOLUTE IMMATURE GRANULOCYTE: 0.12 K/UL (ref 0–0.3)
ABSOLUTE LYMPH #: 1.64 K/UL (ref 1.1–3.7)
ABSOLUTE MONO #: 0.73 K/UL (ref 0.1–1.2)
ANION GAP SERPL CALCULATED.3IONS-SCNC: 13 MMOL/L (ref 9–17)
BASOPHILS # BLD: 0 % (ref 0–2)
BASOPHILS ABSOLUTE: <0.03 K/UL (ref 0–0.2)
BUN BLDV-MCNC: 22 MG/DL (ref 8–23)
BUN/CREAT BLD: ABNORMAL (ref 9–20)
CALCIUM SERPL-MCNC: 8.9 MG/DL (ref 8.6–10.4)
CHLORIDE BLD-SCNC: 103 MMOL/L (ref 98–107)
CO2: 19 MMOL/L (ref 20–31)
CREAT SERPL-MCNC: 0.73 MG/DL (ref 0.5–0.9)
DIFFERENTIAL TYPE: ABNORMAL
EOSINOPHILS RELATIVE PERCENT: 3 % (ref 1–4)
GFR AFRICAN AMERICAN: >60 ML/MIN
GFR NON-AFRICAN AMERICAN: >60 ML/MIN
GFR SERPL CREATININE-BSD FRML MDRD: ABNORMAL ML/MIN/{1.73_M2}
GFR SERPL CREATININE-BSD FRML MDRD: ABNORMAL ML/MIN/{1.73_M2}
GLUCOSE BLD-MCNC: 125 MG/DL (ref 70–99)
GLUCOSE BLD-MCNC: 132 MG/DL (ref 65–105)
HCT VFR BLD CALC: 30.3 % (ref 36.3–47.1)
HEMOGLOBIN: 8.8 G/DL (ref 11.9–15.1)
IMMATURE GRANULOCYTES: 2 %
LYMPHOCYTES # BLD: 22 % (ref 24–43)
MAGNESIUM: 1.8 MG/DL (ref 1.6–2.6)
MCH RBC QN AUTO: 27.3 PG (ref 25.2–33.5)
MCHC RBC AUTO-ENTMCNC: 29 G/DL (ref 28.4–34.8)
MCV RBC AUTO: 94.1 FL (ref 82.6–102.9)
MONOCYTES # BLD: 10 % (ref 3–12)
NRBC AUTOMATED: 0 PER 100 WBC
PDW BLD-RTO: 18.6 % (ref 11.8–14.4)
PHOSPHORUS: 3.3 MG/DL (ref 2.6–4.5)
PLATELET # BLD: 255 K/UL (ref 138–453)
PLATELET ESTIMATE: ABNORMAL
PMV BLD AUTO: 11.3 FL (ref 8.1–13.5)
POTASSIUM SERPL-SCNC: 4.1 MMOL/L (ref 3.7–5.3)
RBC # BLD: 3.22 M/UL (ref 3.95–5.11)
RBC # BLD: ABNORMAL 10*6/UL
SEG NEUTROPHILS: 63 % (ref 36–65)
SEGMENTED NEUTROPHILS ABSOLUTE COUNT: 4.63 K/UL (ref 1.5–8.1)
SODIUM BLD-SCNC: 135 MMOL/L (ref 135–144)
WBC # BLD: 7.4 K/UL (ref 3.5–11.3)
WBC # BLD: ABNORMAL 10*3/UL

## 2020-05-22 PROCEDURE — 2580000003 HC RX 258: Performed by: SPECIALIST

## 2020-05-22 PROCEDURE — 80048 BASIC METABOLIC PNL TOTAL CA: CPT

## 2020-05-22 PROCEDURE — 6360000002 HC RX W HCPCS: Performed by: NURSE PRACTITIONER

## 2020-05-22 PROCEDURE — 6360000002 HC RX W HCPCS: Performed by: SPECIALIST

## 2020-05-22 PROCEDURE — 6360000002 HC RX W HCPCS: Performed by: SURGERY

## 2020-05-22 PROCEDURE — 6360000002 HC RX W HCPCS: Performed by: INTERNAL MEDICINE

## 2020-05-22 PROCEDURE — 47562 LAPAROSCOPIC CHOLECYSTECTOMY: CPT | Performed by: SURGERY

## 2020-05-22 PROCEDURE — 6360000002 HC RX W HCPCS: Performed by: ANESTHESIOLOGY

## 2020-05-22 PROCEDURE — 2580000003 HC RX 258: Performed by: SURGERY

## 2020-05-22 PROCEDURE — 6370000000 HC RX 637 (ALT 250 FOR IP): Performed by: SURGERY

## 2020-05-22 PROCEDURE — 36415 COLL VENOUS BLD VENIPUNCTURE: CPT

## 2020-05-22 PROCEDURE — 3600000019 HC SURGERY ROBOT ADDTL 15MIN: Performed by: SURGERY

## 2020-05-22 PROCEDURE — 7100000001 HC PACU RECOVERY - ADDTL 15 MIN: Performed by: SURGERY

## 2020-05-22 PROCEDURE — 88304 TISSUE EXAM BY PATHOLOGIST: CPT

## 2020-05-22 PROCEDURE — 3600000009 HC SURGERY ROBOT BASE: Performed by: SURGERY

## 2020-05-22 PROCEDURE — 3700000000 HC ANESTHESIA ATTENDED CARE: Performed by: SURGERY

## 2020-05-22 PROCEDURE — 8E0W4CZ ROBOTIC ASSISTED PROCEDURE OF TRUNK REGION, PERCUTANEOUS ENDOSCOPIC APPROACH: ICD-10-PCS | Performed by: SURGERY

## 2020-05-22 PROCEDURE — 2500000003 HC RX 250 WO HCPCS: Performed by: SPECIALIST

## 2020-05-22 PROCEDURE — 99232 SBSQ HOSP IP/OBS MODERATE 35: CPT | Performed by: INTERNAL MEDICINE

## 2020-05-22 PROCEDURE — 82947 ASSAY GLUCOSE BLOOD QUANT: CPT

## 2020-05-22 PROCEDURE — 2700000000 HC OXYGEN THERAPY PER DAY

## 2020-05-22 PROCEDURE — C9113 INJ PANTOPRAZOLE SODIUM, VIA: HCPCS | Performed by: SURGERY

## 2020-05-22 PROCEDURE — 83735 ASSAY OF MAGNESIUM: CPT

## 2020-05-22 PROCEDURE — 85025 COMPLETE CBC W/AUTO DIFF WBC: CPT

## 2020-05-22 PROCEDURE — 99232 SBSQ HOSP IP/OBS MODERATE 35: CPT | Performed by: HOSPITALIST

## 2020-05-22 PROCEDURE — 0D874ZZ DIVISION OF STOMACH, PYLORUS, PERCUTANEOUS ENDOSCOPIC APPROACH: ICD-10-PCS | Performed by: SURGERY

## 2020-05-22 PROCEDURE — 2500000003 HC RX 250 WO HCPCS: Performed by: SURGERY

## 2020-05-22 PROCEDURE — 2580000003 HC RX 258: Performed by: ANESTHESIOLOGY

## 2020-05-22 PROCEDURE — 2500000003 HC RX 250 WO HCPCS: Performed by: FAMILY MEDICINE

## 2020-05-22 PROCEDURE — 0FT44ZZ RESECTION OF GALLBLADDER, PERCUTANEOUS ENDOSCOPIC APPROACH: ICD-10-PCS | Performed by: SURGERY

## 2020-05-22 PROCEDURE — 6360000002 HC RX W HCPCS: Performed by: HOSPITALIST

## 2020-05-22 PROCEDURE — 2709999900 HC NON-CHARGEABLE SUPPLY: Performed by: SURGERY

## 2020-05-22 PROCEDURE — L3650 SO 8 ABD RESTRAINT PRE OTS: HCPCS | Performed by: SURGERY

## 2020-05-22 PROCEDURE — 2580000003 HC RX 258: Performed by: INTERNAL MEDICINE

## 2020-05-22 PROCEDURE — 94761 N-INVAS EAR/PLS OXIMETRY MLT: CPT

## 2020-05-22 PROCEDURE — 84100 ASSAY OF PHOSPHORUS: CPT

## 2020-05-22 PROCEDURE — 43520 INCISION OF PYLORIC MUSCLE: CPT | Performed by: SURGERY

## 2020-05-22 PROCEDURE — 7100000000 HC PACU RECOVERY - FIRST 15 MIN: Performed by: SURGERY

## 2020-05-22 PROCEDURE — S2900 ROBOTIC SURGICAL SYSTEM: HCPCS | Performed by: SURGERY

## 2020-05-22 PROCEDURE — 6370000000 HC RX 637 (ALT 250 FOR IP): Performed by: NURSE PRACTITIONER

## 2020-05-22 PROCEDURE — 2580000003 HC RX 258: Performed by: NURSE PRACTITIONER

## 2020-05-22 PROCEDURE — 1200000000 HC SEMI PRIVATE

## 2020-05-22 PROCEDURE — 3700000001 HC ADD 15 MINUTES (ANESTHESIA): Performed by: SURGERY

## 2020-05-22 RX ORDER — LIDOCAINE HYDROCHLORIDE 10 MG/ML
1 INJECTION, SOLUTION EPIDURAL; INFILTRATION; INTRACAUDAL; PERINEURAL
Status: ACTIVE | OUTPATIENT
Start: 2020-05-22 | End: 2020-05-22

## 2020-05-22 RX ORDER — PROPOFOL 10 MG/ML
INJECTION, EMULSION INTRAVENOUS PRN
Status: DISCONTINUED | OUTPATIENT
Start: 2020-05-22 | End: 2020-05-22 | Stop reason: SDUPTHER

## 2020-05-22 RX ORDER — MEPERIDINE HYDROCHLORIDE 50 MG/ML
12.5 INJECTION INTRAMUSCULAR; INTRAVENOUS; SUBCUTANEOUS EVERY 5 MIN PRN
Status: DISCONTINUED | OUTPATIENT
Start: 2020-05-22 | End: 2020-05-22 | Stop reason: HOSPADM

## 2020-05-22 RX ORDER — ROCURONIUM BROMIDE 10 MG/ML
INJECTION, SOLUTION INTRAVENOUS PRN
Status: DISCONTINUED | OUTPATIENT
Start: 2020-05-22 | End: 2020-05-22 | Stop reason: SDUPTHER

## 2020-05-22 RX ORDER — MIDAZOLAM HYDROCHLORIDE 1 MG/ML
1 INJECTION INTRAMUSCULAR; INTRAVENOUS EVERY 10 MIN PRN
Status: DISCONTINUED | OUTPATIENT
Start: 2020-05-22 | End: 2020-06-02 | Stop reason: HOSPADM

## 2020-05-22 RX ORDER — FENTANYL CITRATE 50 UG/ML
INJECTION, SOLUTION INTRAMUSCULAR; INTRAVENOUS PRN
Status: DISCONTINUED | OUTPATIENT
Start: 2020-05-22 | End: 2020-05-22 | Stop reason: SDUPTHER

## 2020-05-22 RX ORDER — FENTANYL CITRATE 50 UG/ML
25 INJECTION, SOLUTION INTRAMUSCULAR; INTRAVENOUS EVERY 5 MIN PRN
Status: COMPLETED | OUTPATIENT
Start: 2020-05-22 | End: 2020-05-22

## 2020-05-22 RX ORDER — SODIUM CHLORIDE 0.9 % (FLUSH) 0.9 %
10 SYRINGE (ML) INJECTION EVERY 12 HOURS SCHEDULED
Status: DISCONTINUED | OUTPATIENT
Start: 2020-05-22 | End: 2020-06-02 | Stop reason: HOSPADM

## 2020-05-22 RX ORDER — BUPIVACAINE HYDROCHLORIDE AND EPINEPHRINE 5; 5 MG/ML; UG/ML
INJECTION, SOLUTION EPIDURAL; INTRACAUDAL; PERINEURAL PRN
Status: DISCONTINUED | OUTPATIENT
Start: 2020-05-22 | End: 2020-05-22 | Stop reason: ALTCHOICE

## 2020-05-22 RX ORDER — FENTANYL CITRATE 50 UG/ML
50 INJECTION, SOLUTION INTRAMUSCULAR; INTRAVENOUS ONCE
Status: COMPLETED | OUTPATIENT
Start: 2020-05-22 | End: 2020-05-22

## 2020-05-22 RX ORDER — SODIUM CHLORIDE 9 MG/ML
INJECTION, SOLUTION INTRAVENOUS CONTINUOUS PRN
Status: DISCONTINUED | OUTPATIENT
Start: 2020-05-22 | End: 2020-05-22 | Stop reason: SDUPTHER

## 2020-05-22 RX ORDER — SODIUM CHLORIDE 0.9 % (FLUSH) 0.9 %
10 SYRINGE (ML) INJECTION PRN
Status: DISCONTINUED | OUTPATIENT
Start: 2020-05-22 | End: 2020-06-02 | Stop reason: HOSPADM

## 2020-05-22 RX ORDER — LIDOCAINE HYDROCHLORIDE 10 MG/ML
INJECTION, SOLUTION INFILTRATION; PERINEURAL PRN
Status: DISCONTINUED | OUTPATIENT
Start: 2020-05-22 | End: 2020-05-22 | Stop reason: SDUPTHER

## 2020-05-22 RX ORDER — MEROPENEM 1 G/1
INJECTION, POWDER, FOR SOLUTION INTRAVENOUS PRN
Status: DISCONTINUED | OUTPATIENT
Start: 2020-05-22 | End: 2020-05-22 | Stop reason: SDUPTHER

## 2020-05-22 RX ORDER — CALCIUM CHLORIDE 100 MG/ML
INJECTION INTRAVENOUS; INTRAVENTRICULAR PRN
Status: DISCONTINUED | OUTPATIENT
Start: 2020-05-22 | End: 2020-05-22 | Stop reason: SDUPTHER

## 2020-05-22 RX ORDER — MAGNESIUM HYDROXIDE 1200 MG/15ML
LIQUID ORAL CONTINUOUS PRN
Status: COMPLETED | OUTPATIENT
Start: 2020-05-22 | End: 2020-05-22

## 2020-05-22 RX ORDER — FENTANYL CITRATE 50 UG/ML
25 INJECTION, SOLUTION INTRAMUSCULAR; INTRAVENOUS EVERY 5 MIN PRN
Status: DISCONTINUED | OUTPATIENT
Start: 2020-05-22 | End: 2020-05-30

## 2020-05-22 RX ORDER — SODIUM CHLORIDE, SODIUM LACTATE, POTASSIUM CHLORIDE, CALCIUM CHLORIDE 600; 310; 30; 20 MG/100ML; MG/100ML; MG/100ML; MG/100ML
INJECTION, SOLUTION INTRAVENOUS CONTINUOUS
Status: DISCONTINUED | OUTPATIENT
Start: 2020-05-22 | End: 2020-05-25

## 2020-05-22 RX ADMIN — PROPOFOL 150 MG: 10 INJECTION, EMULSION INTRAVENOUS at 07:21

## 2020-05-22 RX ADMIN — PHENYLEPHRINE HYDROCHLORIDE 100 MCG: 10 INJECTION INTRAVENOUS at 09:11

## 2020-05-22 RX ADMIN — Medication 10 ML: at 20:27

## 2020-05-22 RX ADMIN — SUCRALFATE 1 G: 1 TABLET ORAL at 12:44

## 2020-05-22 RX ADMIN — OXYCODONE HYDROCHLORIDE AND ACETAMINOPHEN 1 TABLET: 5; 325 TABLET ORAL at 16:37

## 2020-05-22 RX ADMIN — MORPHINE SULFATE 2 MG: 2 INJECTION, SOLUTION INTRAMUSCULAR; INTRAVENOUS at 21:01

## 2020-05-22 RX ADMIN — ONDANSETRON 4 MG: 2 INJECTION INTRAMUSCULAR; INTRAVENOUS at 20:47

## 2020-05-22 RX ADMIN — PANTOPRAZOLE SODIUM 40 MG: 40 INJECTION, POWDER, LYOPHILIZED, FOR SOLUTION INTRAVENOUS at 20:28

## 2020-05-22 RX ADMIN — PHENYLEPHRINE HYDROCHLORIDE 100 MCG: 10 INJECTION INTRAVENOUS at 07:25

## 2020-05-22 RX ADMIN — SODIUM CHLORIDE, POTASSIUM CHLORIDE, SODIUM LACTATE AND CALCIUM CHLORIDE: 600; 310; 30; 20 INJECTION, SOLUTION INTRAVENOUS at 12:24

## 2020-05-22 RX ADMIN — METOCLOPRAMIDE 10 MG: 5 INJECTION, SOLUTION INTRAMUSCULAR; INTRAVENOUS at 16:37

## 2020-05-22 RX ADMIN — MORPHINE SULFATE 2 MG: 2 INJECTION, SOLUTION INTRAMUSCULAR; INTRAVENOUS at 03:45

## 2020-05-22 RX ADMIN — MORPHINE SULFATE 2 MG: 2 INJECTION, SOLUTION INTRAMUSCULAR; INTRAVENOUS at 18:04

## 2020-05-22 RX ADMIN — PHENYLEPHRINE HYDROCHLORIDE 200 MCG: 10 INJECTION INTRAVENOUS at 07:34

## 2020-05-22 RX ADMIN — DEXTROSE AND SODIUM CHLORIDE: 5; 450 INJECTION, SOLUTION INTRAVENOUS at 00:29

## 2020-05-22 RX ADMIN — SODIUM CHLORIDE, PRESERVATIVE FREE 10 ML: 5 INJECTION INTRAVENOUS at 11:45

## 2020-05-22 RX ADMIN — SUGAMMADEX 200 MG: 100 INJECTION, SOLUTION INTRAVENOUS at 09:27

## 2020-05-22 RX ADMIN — PHENYLEPHRINE HYDROCHLORIDE 200 MCG: 10 INJECTION INTRAVENOUS at 08:42

## 2020-05-22 RX ADMIN — FENTANYL CITRATE 25 MCG: 50 INJECTION, SOLUTION INTRAMUSCULAR; INTRAVENOUS at 10:10

## 2020-05-22 RX ADMIN — MORPHINE SULFATE 2 MG: 2 INJECTION, SOLUTION INTRAMUSCULAR; INTRAVENOUS at 14:44

## 2020-05-22 RX ADMIN — CALCIUM CHLORIDE 0.5 G: 100 INJECTION INTRAVENOUS; INTRAVENTRICULAR at 07:33

## 2020-05-22 RX ADMIN — METOCLOPRAMIDE 10 MG: 5 INJECTION, SOLUTION INTRAMUSCULAR; INTRAVENOUS at 20:27

## 2020-05-22 RX ADMIN — LORAZEPAM 1 MG: 2 INJECTION INTRAMUSCULAR at 03:54

## 2020-05-22 RX ADMIN — ROCURONIUM BROMIDE 20 MG: 10 INJECTION INTRAVENOUS at 07:28

## 2020-05-22 RX ADMIN — LORAZEPAM 1 MG: 2 INJECTION INTRAMUSCULAR at 11:52

## 2020-05-22 RX ADMIN — PHENYLEPHRINE HYDROCHLORIDE 100 MCG: 10 INJECTION INTRAVENOUS at 08:25

## 2020-05-22 RX ADMIN — SODIUM CHLORIDE: 9 INJECTION, SOLUTION INTRAVENOUS at 07:13

## 2020-05-22 RX ADMIN — SUCRALFATE 1 G: 1 TABLET ORAL at 00:11

## 2020-05-22 RX ADMIN — LORAZEPAM 1 MG: 2 INJECTION INTRAMUSCULAR at 16:37

## 2020-05-22 RX ADMIN — ROCURONIUM BROMIDE 30 MG: 10 INJECTION INTRAVENOUS at 07:21

## 2020-05-22 RX ADMIN — METOCLOPRAMIDE 10 MG: 5 INJECTION, SOLUTION INTRAMUSCULAR; INTRAVENOUS at 03:45

## 2020-05-22 RX ADMIN — PHENYLEPHRINE HYDROCHLORIDE 200 MCG: 10 INJECTION INTRAVENOUS at 07:43

## 2020-05-22 RX ADMIN — FENTANYL CITRATE 50 MCG: 50 INJECTION INTRAMUSCULAR; INTRAVENOUS at 07:21

## 2020-05-22 RX ADMIN — LIDOCAINE HYDROCHLORIDE 50 MG: 10 INJECTION, SOLUTION INFILTRATION; PERINEURAL at 07:21

## 2020-05-22 RX ADMIN — FENTANYL CITRATE 50 MCG: 50 INJECTION, SOLUTION INTRAMUSCULAR; INTRAVENOUS at 13:23

## 2020-05-22 RX ADMIN — MORPHINE SULFATE 2 MG: 2 INJECTION, SOLUTION INTRAMUSCULAR; INTRAVENOUS at 00:11

## 2020-05-22 RX ADMIN — TRAZODONE HYDROCHLORIDE 100 MG: 100 TABLET ORAL at 20:27

## 2020-05-22 RX ADMIN — FENTANYL CITRATE 25 MCG: 50 INJECTION, SOLUTION INTRAMUSCULAR; INTRAVENOUS at 10:15

## 2020-05-22 RX ADMIN — PHENYLEPHRINE HYDROCHLORIDE 200 MCG: 10 INJECTION INTRAVENOUS at 07:28

## 2020-05-22 RX ADMIN — ONDANSETRON 4 MG: 2 INJECTION INTRAMUSCULAR; INTRAVENOUS at 09:17

## 2020-05-22 RX ADMIN — FENTANYL CITRATE 50 MCG: 50 INJECTION INTRAMUSCULAR; INTRAVENOUS at 08:42

## 2020-05-22 RX ADMIN — SODIUM CHLORIDE: 234 INJECTION INTRAMUSCULAR; INTRAVENOUS; SUBCUTANEOUS at 18:04

## 2020-05-22 RX ADMIN — MEROPENEM 1 G: 1 INJECTION, POWDER, FOR SOLUTION INTRAVENOUS at 07:39

## 2020-05-22 RX ADMIN — MEROPENEM 1 G: 1 INJECTION, POWDER, FOR SOLUTION INTRAVENOUS at 00:29

## 2020-05-22 RX ADMIN — MORPHINE SULFATE 2 MG: 2 INJECTION, SOLUTION INTRAMUSCULAR; INTRAVENOUS at 11:37

## 2020-05-22 RX ADMIN — OXYCODONE HYDROCHLORIDE AND ACETAMINOPHEN 1 TABLET: 5; 325 TABLET ORAL at 12:44

## 2020-05-22 RX ADMIN — FENTANYL CITRATE 25 MCG: 50 INJECTION, SOLUTION INTRAMUSCULAR; INTRAVENOUS at 10:05

## 2020-05-22 RX ADMIN — Medication 10 ML: at 11:37

## 2020-05-22 RX ADMIN — FENTANYL CITRATE 25 MCG: 50 INJECTION, SOLUTION INTRAMUSCULAR; INTRAVENOUS at 10:20

## 2020-05-22 ASSESSMENT — PULMONARY FUNCTION TESTS
PIF_VALUE: 25
PIF_VALUE: 7
PIF_VALUE: 24
PIF_VALUE: 25
PIF_VALUE: 0
PIF_VALUE: 24
PIF_VALUE: 23
PIF_VALUE: 25
PIF_VALUE: 23
PIF_VALUE: 24
PIF_VALUE: 37
PIF_VALUE: 1
PIF_VALUE: 25
PIF_VALUE: 36
PIF_VALUE: 25
PIF_VALUE: 50
PIF_VALUE: 36
PIF_VALUE: 1
PIF_VALUE: 24
PIF_VALUE: 6
PIF_VALUE: 25
PIF_VALUE: 36
PIF_VALUE: 36
PIF_VALUE: 37
PIF_VALUE: 25
PIF_VALUE: 36
PIF_VALUE: 25
PIF_VALUE: 23
PIF_VALUE: 24
PIF_VALUE: 26
PIF_VALUE: 26
PIF_VALUE: 1
PIF_VALUE: 24
PIF_VALUE: 25
PIF_VALUE: 36
PIF_VALUE: 28
PIF_VALUE: 26
PIF_VALUE: 0
PIF_VALUE: 1
PIF_VALUE: 26
PIF_VALUE: 25
PIF_VALUE: 36
PIF_VALUE: 25
PIF_VALUE: 24
PIF_VALUE: 41
PIF_VALUE: 42
PIF_VALUE: 25
PIF_VALUE: 24
PIF_VALUE: 25
PIF_VALUE: 24
PIF_VALUE: 23
PIF_VALUE: 36
PIF_VALUE: 25
PIF_VALUE: 36
PIF_VALUE: 23
PIF_VALUE: 24
PIF_VALUE: 26
PIF_VALUE: 25
PIF_VALUE: 36
PIF_VALUE: 36
PIF_VALUE: 22
PIF_VALUE: 1
PIF_VALUE: 25
PIF_VALUE: 24
PIF_VALUE: 25
PIF_VALUE: 36
PIF_VALUE: 25
PIF_VALUE: 24
PIF_VALUE: 7
PIF_VALUE: 26
PIF_VALUE: 25
PIF_VALUE: 24
PIF_VALUE: 0
PIF_VALUE: 36
PIF_VALUE: 25
PIF_VALUE: 0
PIF_VALUE: 36
PIF_VALUE: 36
PIF_VALUE: 25
PIF_VALUE: 36
PIF_VALUE: 36
PIF_VALUE: 25
PIF_VALUE: 37
PIF_VALUE: 24
PIF_VALUE: 25
PIF_VALUE: 5
PIF_VALUE: 25
PIF_VALUE: 24
PIF_VALUE: 25
PIF_VALUE: 7
PIF_VALUE: 28
PIF_VALUE: 36
PIF_VALUE: 23
PIF_VALUE: 26
PIF_VALUE: 25
PIF_VALUE: 8
PIF_VALUE: 6
PIF_VALUE: 6
PIF_VALUE: 25
PIF_VALUE: 4
PIF_VALUE: 1
PIF_VALUE: 30
PIF_VALUE: 25
PIF_VALUE: 25
PIF_VALUE: 36
PIF_VALUE: 25
PIF_VALUE: 36
PIF_VALUE: 5
PIF_VALUE: 36
PIF_VALUE: 25
PIF_VALUE: 25
PIF_VALUE: 1
PIF_VALUE: 24
PIF_VALUE: 22
PIF_VALUE: 36
PIF_VALUE: 25
PIF_VALUE: 36
PIF_VALUE: 25
PIF_VALUE: 6
PIF_VALUE: 1
PIF_VALUE: 24
PIF_VALUE: 25
PIF_VALUE: 26
PIF_VALUE: 28
PIF_VALUE: 25
PIF_VALUE: 24
PIF_VALUE: 36
PIF_VALUE: 8
PIF_VALUE: 5
PIF_VALUE: 36
PIF_VALUE: 26
PIF_VALUE: 25
PIF_VALUE: 36
PIF_VALUE: 36
PIF_VALUE: 1
PIF_VALUE: 36
PIF_VALUE: 36
PIF_VALUE: 35
PIF_VALUE: 25
PIF_VALUE: 36
PIF_VALUE: 4
PIF_VALUE: 24
PIF_VALUE: 37

## 2020-05-22 ASSESSMENT — PAIN DESCRIPTION - PAIN TYPE
TYPE: SURGICAL PAIN;ACUTE PAIN
TYPE: SURGICAL PAIN

## 2020-05-22 ASSESSMENT — PAIN SCALES - GENERAL
PAINLEVEL_OUTOF10: 6
PAINLEVEL_OUTOF10: 9
PAINLEVEL_OUTOF10: 8
PAINLEVEL_OUTOF10: 8
PAINLEVEL_OUTOF10: 9
PAINLEVEL_OUTOF10: 5
PAINLEVEL_OUTOF10: 8
PAINLEVEL_OUTOF10: 10
PAINLEVEL_OUTOF10: 9
PAINLEVEL_OUTOF10: 4
PAINLEVEL_OUTOF10: 9
PAINLEVEL_OUTOF10: 10
PAINLEVEL_OUTOF10: 10
PAINLEVEL_OUTOF10: 8

## 2020-05-22 ASSESSMENT — PAIN SCALES - WONG BAKER
WONGBAKER_NUMERICALRESPONSE: 4
WONGBAKER_NUMERICALRESPONSE: 4
WONGBAKER_NUMERICALRESPONSE: 0
WONGBAKER_NUMERICALRESPONSE: 2

## 2020-05-22 ASSESSMENT — PAIN DESCRIPTION - LOCATION: LOCATION: SHOULDER;RIB CAGE;ABDOMEN

## 2020-05-22 ASSESSMENT — PAIN DESCRIPTION - FREQUENCY: FREQUENCY: CONTINUOUS

## 2020-05-22 ASSESSMENT — PAIN DESCRIPTION - DESCRIPTORS
DESCRIPTORS: SHARP
DESCRIPTORS: SHARP

## 2020-05-22 ASSESSMENT — PAIN DESCRIPTION - ONSET: ONSET: ON-GOING

## 2020-05-22 ASSESSMENT — PAIN DESCRIPTION - ORIENTATION: ORIENTATION: RIGHT

## 2020-05-22 NOTE — PROGRESS NOTES
Surgery Progress Note            PATIENT NAME: Raman Meza     TODAY'S DATE: 5/22/2020, 6:22 AM    SUBJECTIVE:    Pt seen and examined. No acute overnight events. Sleeping comfortably in bed. States she is feel like \"crap\" and she doesn't feel good. States she has abdominal pain and feels nauseous. No vomiting. Vitals normal and stable. Afebrile. Understands she is going for surgery today. OBJECTIVE:   VITALS:  BP (!) 95/55   Pulse 93   Temp 98.2 °F (36.8 °C) (Oral)   Resp 15   Ht 5' 3\" (1.6 m)   Wt 178 lb (80.7 kg)   SpO2 93%   BMI 31.53 kg/m²      INTAKE/OUTPUT:      Intake/Output Summary (Last 24 hours) at 5/22/2020 1342  Last data filed at 5/22/2020 4873  Gross per 24 hour   Intake 2916 ml   Output 1000 ml   Net 1916 ml       PHYSICAL EXAM  General Appearance: in no acute distress  Skin:  Warm and dry   Head/face:  NCAT and sclera anicteric   Lungs:  Respirations even and unlabored   Heart:  Heart regular rate and rhythm  Abdomen:  Soft, non-distended, non-tender. PEG tube intact; no erythema or drainage at tube site.    Extremities: no edema and no cyanosis          Data:  CBC with Differential:    Lab Results   Component Value Date    WBC 7.4 05/22/2020    RBC 3.22 05/22/2020    HGB 8.8 05/22/2020    HCT 30.3 05/22/2020     05/22/2020    MCV 94.1 05/22/2020    MCH 27.3 05/22/2020    MCHC 29.0 05/22/2020    RDW 18.6 05/22/2020    NRBC 1 05/12/2020    LYMPHOPCT 22 05/22/2020    MONOPCT 10 05/22/2020    BASOPCT 0 05/22/2020    MONOSABS 0.73 05/22/2020    LYMPHSABS 1.64 05/22/2020    EOSABS 0.24 05/22/2020    BASOSABS <0.03 05/22/2020    DIFFTYPE NOT REPORTED 05/22/2020     BMP:    Lab Results   Component Value Date     05/22/2020    K 4.1 05/22/2020     05/22/2020    CO2 19 05/22/2020    BUN 22 05/22/2020    LABALBU 2.8 05/21/2020    CREATININE 0.73 05/22/2020    CALCIUM 8.9 05/22/2020    GFRAA >60 05/22/2020    LABGLOM >60 05/22/2020    GLUCOSE 125 05/22/2020       Radiology

## 2020-05-22 NOTE — PROGRESS NOTES
Formula: 2-in-1 Custom   · Lipids: None  · Rate/Volume: 60 mL/hr   · Duration: Continuous  · Current PN Order Provides: 325 gms dextrose + 75 gms AA ~> 1405 kcals, 75 gms protein   · Goal PN Orders Provides: 325 gms dextrose + 75 gms AA ~> 1405 kcals, 75 gms protein   · Additional Calories: D5%0.45%NaCl at 20 ml/hr =82 kcal/day  · Anthropometric Measures:  · Ht: 5' 3\" (160 cm)   · Current Body Wt: 178 lb (80.7 kg)  · Admission Body Wt: 181 lb (82.1 kg)  · Usual Body Wt: 158 lb (71.7 kg)(per pt )  · % Weight Change:  ,  179-186 lbs over 3 mo per EMR   · Ideal Body Wt: 115 lb (52.2 kg), % Ideal Body 157% adm/ideal  · BMI Classification: BMI 30.0 - 34.9 Obese Class I    Nutrition Interventions:   Continue NPO, Modify current Parenteral Nutrition(Restart diet as able )  Continued Inpatient Monitoring, Education Not Indicated    Nutrition Evaluation:   · Evaluation: Goal achieved   · Goals: meet % of estimated nutrition needs with nutrition support/oral diet   · Monitoring: Nutrition Progression, PN Tolerance, PN Intake, Skin Integrity, Wound Healing, I&O, Weight, Pertinent Labs, Monitor Bowel Function      Electronically signed by Anand Chester RD, LD on 5/22/20 at 10:53 AM EDT    Contact Number:[phone]

## 2020-05-22 NOTE — PLAN OF CARE
integrity will stabilize  Description: Skin integrity will stabilize  5/22/2020 1812 by Adriane Koroma RN  Outcome: Ongoing  5/22/2020 0534 by Juan Diego Edge RN  Outcome: Ongoing     Problem: Discharge Planning:  Goal: Patients continuum of care needs are met  Description: Patients continuum of care needs are met  5/22/2020 1812 by Adriane Koroma RN  Outcome: Ongoing  5/22/2020 0534 by Juan Diego Edge RN  Outcome: Ongoing     Problem: MECHANICAL VENTILATION  Goal: Tracheostomy will be managed safely  Outcome: Ongoing     Problem: ABCDS Injury Assessment  Goal: Absence of physical injury  5/22/2020 1812 by Adriane Koroma RN  Outcome: Ongoing  5/22/2020 0534 by Juan Diego Edge RN  Outcome: Met This Shift

## 2020-05-22 NOTE — BRIEF OP NOTE
Brief Postoperative Note      Patient: Tari Horn  YOB: 1945  MRN: [id number]    Date of Procedure: 5/22/2020    Pre-Op Diagnosis: CHOLELITHIASIS, Gastric Outlet Obstruction    Post-Op Diagnosis: Same as above       Procedure(s):  XI LAPAROSCOPIC ROBOTIC CHOLECYSTECTOMY, PYLOROPLASTY    Surgeon(s):  Senait Anand MD    Assistant:  First Assistant: Stewart Shi  Fellow: Scottie Nicole DO  Resident: Ivette Maza DO; Martin Diop DO    Anesthesia: General    Estimated Blood Loss (mL): Minimal    Complications: None    Specimens:   ID Type Source Tests Collected by Time Destination   A : GALLBLADDER AND CONTENTS Tissue Gallbladder SURGICAL PATHOLOGY Senait Anand MD 5/22/2020 1989        Implants:  Implant Name Type Inv. Item Serial No.  Lot No. LRB No. Used Action   [email or web address]     [id number] N/A 1 Implanted         Drains:   Gastrostomy/Enterostomy/Jejunostomy Percutaneous Endoscopic Gastrostomy (PEG) LLQ 1 20 fr (Active)   Drainage Appearance Bile 5/19/2020  4:00 AM   Site Description Healing 5/22/2020  7:07 AM   Drain Status Clamped 5/22/2020  7:07 AM   Surrounding Skin Dry; Intact 5/22/2020  7:07 AM   Dressing Status Clean;Dry; Intact; Changed 5/22/2020  7:07 AM   Dressing Type Split gauze 5/22/2020  7:07 AM   Free Water Flush (mL) 250 mL 5/21/2020  9:50 PM   Output (mL) 550 ml 5/19/2020  7:35 AM   Tube Placement Verified Yes 5/18/2020  5:23 PM       Urethral Catheter (Active)   Catheter Indications Urology/Urologist seeing this patient or inserted indwelling catheter 5/22/2020  7:07 AM   Securement Device Date Changed 05/16/20 5/20/2020 11:00 PM   Site Assessment No urethral drainage 5/21/2020  9:53 PM   Urine Color Kathleen 5/22/2020  7:07 AM   Urine Appearance Clear 5/22/2020  7:07 AM   Urine Odor Malodorous 5/18/2020  5:17 AM   Output (mL) 550 mL 5/22/2020  4:12 AM       [REMOVED] Urethral Catheter Temperature probe 16 fr (Removed)       Findings: See Op Note    Electronically

## 2020-05-22 NOTE — PLAN OF CARE
Problem: Pain:  Goal: Pain level will decrease  Description: Pain level will decrease  5/22/2020 1933 by Maci Jennings RN  Outcome: Ongoing     Problem: Pain:  Goal: Control of acute pain  Description: Control of acute pain  5/22/2020 1933 by Maci Jennings RN  Outcome: Ongoing     Problem: Pain:  Goal: Control of chronic pain  Description: Control of chronic pain  5/22/2020 1933 by Maci Jennings RN  Outcome: Ongoing     Problem: Falls - Risk of:  Goal: Will remain free from falls  Description: Will remain free from falls  5/22/2020 1933 by Maci Jennings RN  Outcome: Ongoing     Problem: Falls - Risk of:  Goal: Absence of physical injury  Description: Absence of physical injury  5/22/2020 1933 by Maci Jennings RN  Outcome: Ongoing     Problem: Nutrition  Goal: Optimal nutrition therapy  5/22/2020 1933 by Maci Jennings RN  Outcome: Ongoing     Problem: Musculor/Skeletal Functional Status  Goal: Highest potential functional level  5/22/2020 1933 by Maci Jennings RN  Outcome: Ongoing     Problem: Infection:  Goal: Will remain free from infection  Description: Will remain free from infection  5/22/2020 1933 by Maci Jennings RN  Outcome: Ongoing     Problem: Safety:  Goal: Free from accidental physical injury  Description: Free from accidental physical injury  5/22/2020 1933 by Maci Jennnigs RN  Outcome: Ongoing     Problem: Daily Care:  Goal: Daily care needs are met  Description: Daily care needs are met  5/22/2020 1933 by Maci Jennings RN  Outcome: Ongoing     Problem: Skin Integrity:  Goal: Skin integrity will stabilize  Description: Skin integrity will stabilize  5/22/2020 1933 by Maci Jennings RN  Outcome: Ongoing     Problem: Discharge Planning:  Goal: Patients continuum of care needs are met  Description: Patients continuum of care needs are met  5/22/2020 1933 by Maci Jennings RN  Outcome: Ongoing     Problem: MECHANICAL VENTILATION  Goal: Tracheostomy will be managed safely  5/22/2020 1933 by Marry Alatorre, RN  Outcome: Ongoing     Problem: ABCDS Injury Assessment  Goal: Absence of physical injury  5/22/2020 1933 by Marry Alatorre, RN  Outcome: Ongoing

## 2020-05-22 NOTE — ANESTHESIA POSTPROCEDURE EVALUATION
Department of Anesthesiology  Postprocedure Note    Patient: Anuj Ram  MRN: [id number]  YOB: 1945  Date of evaluation: 5/22/2020  Time:  11:00 AM     Procedure Summary     Date:  05/22/20 Room / Location:  [address]    Anesthesia Start:  2187 Anesthesia Stop:  3166    Procedure:  XI LAPAROSCOPIC ROBOTIC CHOLECYSTECTOMY, PYLOROPLASTY (N/A ) Diagnosis:  (CHOLELITHIASIS)    Surgeon:  Deonna Cheema MD Responsible Provider:  Yovana Jackson MD    Anesthesia Type:  general ASA Status:  3          Anesthesia Type: general    Alhaji Phase I: Alhaji Score: 9    Alhaji Phase II: Alhaji Score: 9    Last vitals: Reviewed and per EMR flowsheets.        Anesthesia Post Evaluation    Patient location during evaluation: PACU  Patient participation: complete - patient participated  Level of consciousness: awake and alert  Pain score: 3  Airway patency: patent  Nausea & Vomiting: no vomiting and no nausea  Complications: no  Cardiovascular status: hemodynamically stable  Respiratory status: acceptable  Hydration status: stable

## 2020-05-22 NOTE — PROGRESS NOTES
Niur[facility] Gamble [facility]    Progress Note    5/22/2020    11:36 AM    Name:   Linden Gomez  MRN:     [id number]     Acct:      [de-identified]   Room:   [address] [place] [address] Day:  8  Admit Date:  5/12/2020  3:19 PM    PCP:   Fatemeh Sebastian MD  Code Status:  Full Code    Subjective:     C/C: No chief complaint on file. Patient Active Problem List   Diagnosis    Frequency of urination    Severe sepsis (HCC)    Back pain    GERD (gastroesophageal reflux disease)    MVP (mitral valve prolapse)    Depression    Anxiety    Urine retention    Acute kidney injury ([address]    Dysphagia    Paraesophageal hernia    History of repair of hiatal hernia    Aspiration pneumonia (HCC)    Respiratory failure (HCC)    Centrilobular emphysema (HCC)    Atelectasis    Pleural effusion    Esophageal dilatation    Upper GI bleeding    Acute on chronic blood loss anemia    Shock ([address]    Fever    Acute postoperative respiratory insufficiency    Critical illness polyneuropathy (HCC)    Gastric outlet obstruction    Recurrent UTI    Tracheostomy in place ([address]    H/O gastric ulcer    Hyperlipidemia    Hypertension    Tobacco abuse    Chronic respiratory failure with hypoxia (HCC)    Status post insertion of percutaneous endoscopic gastrostomy (PEG) tube (HCC)    S/P Nissen fundoplication (without gastrostomy tube) procedure    Paralytic ileus ([address]    Elevated liver enzymes     Interval History Status: not changed. Patient was seen and examined at bedside. No acute overnight event. Patient is status post cholecystectomy and pyloroplasty today. Patient tolerated the procedure well and now is in recovery in her room. No new complaint. No fever chills. No chest pain or shortness of breath.     Brief History:     Per records:     Derejegulshan Briceñojose RAMÍREZ a 76 y.o. Non-/non  female who presents with No chief complaint on file.   and is admitted Penicillin V Potassium     Penicillins Other (See Comments)     shock       Current Meds:   Scheduled Meds:    sodium chloride flush  10 mL Intravenous 2 times per day    sodium chloride flush  10 mL Intravenous 2 times per day    [MAR Hold] meropenem  1 g Intravenous Q8H    enoxaparin  40 mg Subcutaneous Daily    pantoprazole  40 mg Intravenous BID    And    sodium chloride (PF)  10 mL Intravenous BID    sucralfate  1 g Oral 4 times per day    metoprolol succinate  25 mg Oral Daily    [Held by provider] furosemide  20 mg Oral Daily    bisacodyl  10 mg Rectal Daily    clonazePAM  0.5 mg Oral BID    QUEtiapine  100 mg Oral Nightly    traZODone  100 mg Oral Nightly    tamsulosin  0.4 mg Oral Daily    metoclopramide  10 mg Intravenous Q6H    sodium chloride flush  10 mL Intravenous 2 times per day    amitriptyline  25 mg Oral TID    busPIRone  20 mg Oral TID    escitalopram  20 mg Oral Daily    gabapentin  300 mg Oral TID     Continuous Infusions:    lactated ringers      PN-Adult 2-in-1 [employer] (Standard)      PN-Adult 2-in-1 [employer] (Standard) 60 mL/hr at 05/21/20 1802    dextrose 5 % and 0.45 % NaCl 20 mL/hr at 05/22/20 0029     PRN Meds: sodium chloride flush, lidocaine PF, fentanNYL, midazolam, sodium chloride flush, ondansetron, fentanNYL, fentanNYL, fentanNYL, fentanNYL, oxyCODONE-acetaminophen, labetalol, hydrALAZINE, LORazepam, morphine, acetaminophen, sodium chloride flush, potassium chloride **OR** potassium alternative oral replacement **OR** potassium chloride, magnesium sulfate, nicotine    Data:     Past Medical History:   has a past medical history of Anxiety, Arthritis, Back pain, Bronchitis, Caffeine use, Carpal tunnel syndrome, Cataracts, bilateral, Constipation, Depression, Diarrhea, GERD (gastroesophageal reflux disease), H/O gastric ulcer, Hyperlipidemia, Hypertension, Mumps, MVP (mitral valve prolapse), Recurrent UTI, Sinusitis, Tracheostomy in place [facility]), 2.8*  --   --   --   --   --    AST 51*  --   --  31  --   --   --   --   --    ALT 80*  --   --  61*  --   --   --   --   --    ALKPHOS 162*  --   --  147*  --   --   --   --   --    BILITOT 0.30  --   --  0.20*  --   --   --   --   --    BILIDIR 0.13  --   --   --   --   --   --   --   --    TRIG  --   --   --  408*  --   --   --   --   --    POCGLU  --    < > 120*  --  115* 120* 123* 98 132*    < > = values in this interval not displayed. ABG:  Lab Results   Component Value Date    POCPH 7.439 04/06/2020    POCPCO2 41.9 04/06/2020    POCPO2 125.9 04/06/2020    POCHCO3 28.4 04/06/2020    NBEA NOT REPORTED 04/06/2020    PBEA 4 04/06/2020    CYJ7LZL 30 04/06/2020    JLVY1NBR 99 04/06/2020    FIO2 40.0 04/06/2020     Lab Results   Component Value Date/Time    SPECIAL R HAND 2ML 05/14/2020 11:10 AM     Lab Results   Component Value Date/Time    CULTURE NO GROWTH 6 DAYS 05/14/2020 11:10 AM       Radiology:  Fl Esophagram    Result Date: 5/15/2020  Dilated esophagus without evidence of esophageal outlet obstruction; contrast flows into the stomach. Somewhat narrow caliber of the GE junction. Fl Ercp Biliary S&i    Result Date: 5/21/2020  Intraprocedural fluoroscopic spot images as above. See separate procedure report for more information. Us Gallbladder Ruq    Result Date: 5/16/2020  Cholelithiasis without evidence for acute cholecystitis. Enlargement of the common duct measuring up to 8 mm without intrahepatic biliary dilatation. This appears new since prior CT imaging. Consider further evaluation with MRI/MRCP if clinically appropriate. Mri Abdomen Wo Contrast Mrcp    Result Date: 5/18/2020  1. Cholelithiasis. Mildly prominent CBD with a suspected filling defect seen within the distal CBD suggestive of choledocholithiasis. Further evaluation with ERCP is recommended. 2. Hepatic steatosis. 3. Moderate size hiatal hernia.      Fl Ugi    Result Date: 5/15/2020  Findings are suspicious of at least

## 2020-05-22 NOTE — PROGRESS NOTES
procedure she developed acute respiratory distress and a fever of 39.8. She was emergently intubated and started on Meropenem and Vancomycin. · Patient improved with treatment. · Transferred to [facility] on 4-6-20   · ransferred back to [facility] on 5-12-20 because of persistent vomiting, abdominal pain. Concern for possible SBO. · Found to have Severe esophagitis, nodular gastritis, pyloric stenosis by EGD on 5-18-20. S/P pyloric balloon dilatation. · Underwent cholecystectomy and pyloroplasty 5-22-20  · On meropenem perioperatively. Infection Control Recommendations   · [place] Precautions    Antimicrobial Stewardship Recommendations     · Discontinuation of therapy  Coordination of Outpatient Care:     · Estimated Length of IV antimicrobials:None  · Patient will need Midline Catheter Insertion: No  · Patient will need PICC line Insertion:No  · Patient will need: Home IV , [facility],  SNF: TBD  · Patient will need outpatient wound care:Yes  Chief complaint/reason for consultation:   · Intraabdominal infection  · Pneumonia    History of Present Illness:   Al Otoole is a 76y.o.-year-old  female who was initially admitted on 5/12/2020. Patient seen at the request of Tatyana Fung. INITIAL EVALUATION:     Patient known to my service from prior evaluation at [facility].     Patient has a history of severe GERD despite being on PPI, H2 blocker and Tums. She underwent laparoscopic robotic hiatal hernia repair and fundoplication last month on 02/24/2020 and postoperatively she developed hypoxia. CT chest done on 02/26/2020 showed bibasilar atelectasis/consolidation and patchy area of infiltrate in the right upper lobe. Patient was discharged on home oxygen on 2-28.     She [facility] on [facility] with worsening shortness of breath, dyspnea as well as an episode of dark red emesis. Her SaO2 was 60 % in the ED. There was concern of aspiration.  CTA chest done was negative for PE, showed bilateral pleural effusions and severe esophageal dilatation, surgery was consulted. Patient was transferred to [facility] for further management.     Patient was started on noninvasive ventilation and then intubated and admitted to the ICU. She improved, was extubated on 3-7 to high flow O2. Pt was transferred out of the ICU on 3-13.     On 3-14 pts hgb dropped to 6.3 and she developed coffee-ground then bright red emesis, and was again transferred back to medical ICU.     General surgery performed endoscopy on 3/17 which showed an ulcer at the GE junction and mild gastritis with no active bleeding. After the procedure she developed acute respiratory distress and a fever of 39.8. She was emergently intubated and started on Meropenem and Vancomycin.      ID is consulted for antibiotic management. Patient was continued on meropenem. Vancomycin D/C     S/P trach and PEG placement 3/27     Flat plate of abdomen 6-85-79 with nonobstructive gas pattern with retained enteric contrast in the colon  On TPN. Patient could not tolerate tube feeds. Has superficial venous clot Rt Mid forearm to antecubital      Tracheal exchange in OR done on 4-2-20. Patient transferred to [facility] on 4-6-20. Transferred back to [facility] on 5-12-20 because of persistent vomiting, abdominal pain. Concern for possible SBO. Pt NPO on TPN. 5-15 Passed swallow study to Dys III w/thickened liquids  Per surgery, pt for esophagram to evaluate the hiatal hernia repair. If the esophagram appears normal--> we will plan for a PEGogram and evaluate whether the stomach is emptying appropriately. CURRENT EVALUATION: 5/22/2020     Afebrile  VS stable  Underwent cholecystectomy and pyloroplasty 5-22-20  Indicating pain post op     Abdomen distended  Few bowel sounds  On meropenem isabel-op. Has severe allergy to penicillins but has tolerated Meropenem. Tracheostomy capped.   Tracheal secretions scant, whitish.      On TPN     Pertinent Labs and X rays 05/21/2020    with stent insertion, balloon dilation, sphinctereotomy    EYE SURGERY      patricia IOL    GASTRIC FUNDOPLICATION N/A 4/65/5921    XI LAPAROSCOPIC ROBOTIC HIATAL HERNIA REPAIR, CHERYL FUNDOPLICATION performed by Christina Best MD at [facility] N/A 3/27/2020    EGD PEG TUBE PLACEMENT performed by Christina Best MD at [address] [place]. CATH POWER PICC TRIPLE  3/4/2020         HYSTERECTOMY      Abdominal    JOINT REPLACEMENT Right     right hip    OVARY REMOVAL      RHINOPLASTY      TONSILLECTOMY      TOTAL HIP ARTHROPLASTY      TOTAL NEPHRECTOMY      atrophic from infections, age 13   [employer] TRACHEOSTOMY N/A 3/27/2020    **ADD ON **WANTS 10:00AM **TRACHEOTOMY performed by Christina Best MD at [address] 4/2/2020    TRACHEOTOMY EXCHANGE performed by Christina Best MD at [address] 3/17/2020    BEDSIDE EGD ESOPHAGOGASTRODUODENOSCOPY (ICU) performed by Christina Best MD at [facility]    UPPER GASTROINTESTINAL ENDOSCOPY N/A 5/18/2020    EGD BIOPSY performed by Chel Block MD at [facility]  5/18/2020    EGD DILATION BALLOON performed by Chel Block MD at [facility]       Medications:      sodium chloride flush  10 mL Intravenous 2 times per day    sodium chloride flush  10 mL Intravenous 2 times per day    [MAR Hold] meropenem  1 g Intravenous Q8H    enoxaparin  40 mg Subcutaneous Daily    pantoprazole  40 mg Intravenous BID    And    sodium chloride (PF)  10 mL Intravenous BID    sucralfate  1 g Oral 4 times per day    metoprolol succinate  25 mg Oral Daily    [Held by provider] furosemide  20 mg Oral Daily    bisacodyl  10 mg Rectal Daily    clonazePAM  0.5 mg Oral BID    QUEtiapine  100 mg Oral Nightly    traZODone  100 mg Oral Nightly    tamsulosin  0.4 mg Oral Daily    metoclopramide  10 mg Intravenous lung cancer        Allergies:   Prednisone; Compazine [prochlorperazine maleate]; Penicillin v potassium; and Penicillins     Review of Systems:   Constitutional: No fevers or chills. No systemic complaints  Head: No headaches  Eyes: No double vision or blurry vision. No conjunctival inflammation. ENT: No sore throat or runny nose. . No hearing loss, tinnitus or vertigo. Cardiovascular: No chest pain or palpitations. No shortness of breath. No HOLLAND  Lung: No shortness of breath or cough. No sputum production  Abdomen: No nausea, vomiting, diarrhea, or abdominal pain. Erven Hurl No cramps. Genitourinary: No increased urinary frequency, or dysuria. No hematuria. No suprapubic or CVA pain  Musculoskeletal: No muscle aches or pains. No joint effusions, swelling or deformities  Hematologic: No bleeding or bruising. Neurologic: No headache, weakness, numbness, or tingling. Integument: No rash, no ulcers. Psychiatric: No depression. Endocrine: No polyuria, no polydipsia, no polyphagia. Physical Examination :     Patient Vitals for the past 8 hrs:   BP Temp Temp src Pulse Resp SpO2   05/22/20 1304 112/68 98 °F (36.7 °C) Axillary 97 -- 100 %   05/22/20 1252 -- -- -- -- 24 --   05/22/20 1059 (!) 99/44 98.1 °F (36.7 °C) Oral 92 20 99 %   05/22/20 1030 95/67 98.6 °F (37 °C) -- 91 24 97 %   05/22/20 1015 (!) 110/53 -- -- 91 25 99 %   05/22/20 1000 (!) 110/51 -- -- 95 24 96 %   05/22/20 0945 (!) 94/54 98.6 °F (37 °C) Temporal 95 (!) 31 94 %   05/22/20 0654 (!) 97/27 98.2 °F (36.8 °C) Temporal 89 16 98 %     General Appearance: Awake, alert, and in no apparent distress  Head:  Normocephalic, no trauma  Eyes: Pupils equal, round, reactive to light and accommodation; extraocular movements intact; sclera anicteric; conjunctivae pink. No embolic phenomena. ENT: Oropharynx clear, without erythema, exudate, or thrush. No tenderness of sinuses. Mouth/throat: mucosa pink and moist. No lesions. Dentition in good repair.   Neck:Supple, is no free air. There are no abnormal   masses or calcifications. The osseous structures and soft tissues are unremarkable.     IMPRESSION:     No acute finding. PEG tube is in place.        Electronically Signed By: Dr. Toma Aden M.D. 04/13/2020 9:45:23 EDT    Medical Decision [id number]:       Medical Decision Making-Other:     Note:  · Labs, medications, radiologic studies were reviewed with personal review of films  · Large amounts of data were reviewed  · Discussed with nursing Staff, Discharge planner  · Infection Control and Prevention measures reviewed  · All prior entries were reviewed  · Administer medications as ordered  · Prognosis: Guarded  · Discharge planning reviewed  · Follow up as outpatient. Thank you for allowing us to participate in the care of this patient. Please call with questions.     Bobby Davison MD  Pager: [phone] - Office: [phone]

## 2020-05-22 NOTE — PLAN OF CARE
Problem: Falls - Risk of:  Goal: Will remain free from falls  Description: Will remain free from falls  5/22/2020 0534 by Lin Eden RN  Outcome: Met This Shift  5/21/2020 1648 by Randa Irwin RN  Outcome: Ongoing  Goal: Absence of physical injury  Description: Absence of physical injury  5/22/2020 0534 by Lin Eden RN  Outcome: Met This Shift  5/21/2020 1648 by Randa Irwin RN  Outcome: Ongoing     Problem: Safety:  Goal: Free from accidental physical injury  Description: Free from accidental physical injury  5/22/2020 0534 by Lin Eden RN  Outcome: Met This Shift  5/21/2020 1648 by aRnda Irwin RN  Outcome: Ongoing     Problem: Daily Care:  Goal: Daily care needs are met  Description: Daily care needs are met  5/22/2020 0534 by Lin Eden RN  Outcome: Met This Shift  5/21/2020 1648 by Randa Irwin RN  Outcome: Ongoing     Problem: ABCDS Injury Assessment  Goal: Absence of physical injury  5/22/2020 0534 by Lin Eden RN  Outcome: Met This Shift  5/21/2020 1648 by Randa Irwin RN  Outcome: Ongoing     Problem: Pain:  Goal: Pain level will decrease  Description: Pain level will decrease  5/22/2020 0534 by Lin Eden RN  Outcome: Ongoing  5/21/2020 1648 by Randa Irwin RN  Outcome: Ongoing  Goal: Control of acute pain  Description: Control of acute pain  5/22/2020 0534 by Lin Eden RN  Outcome: Ongoing  5/21/2020 1648 by Randa Irwin RN  Outcome: Ongoing  Goal: Control of chronic pain  Description: Control of chronic pain  5/22/2020 0534 by Lin Eden RN  Outcome: Ongoing  5/21/2020 1648 by Randa Irwin RN  Outcome: Ongoing     Problem: Nutrition  Goal: Optimal nutrition therapy  5/21/2020 1648 by Randa Irwin RN  Outcome: Ongoing     Problem: Musculor/Skeletal Functional Status  Goal: Highest potential functional level  5/22/2020 0534 by Lin Eden RN  Outcome: Ongoing  5/21/2020 1648 by Randa Irwin RN  Outcome: Ongoing     Problem: Infection:  Goal: Will

## 2020-05-22 NOTE — CARE COORDINATION
Spoke with Trace from [facility]. Informed him that the patient had her surgery and the discharge is probable tomorrow.   Trace states that he is ob call all weekend and to call when discharge is confirmed  [place] phone [phone]

## 2020-05-23 LAB
ABSOLUTE EOS #: <0.03 K/UL (ref 0–0.44)
ABSOLUTE IMMATURE GRANULOCYTE: 0.11 K/UL (ref 0–0.3)
ABSOLUTE LYMPH #: 0.94 K/UL (ref 1.1–3.7)
ABSOLUTE MONO #: 1.32 K/UL (ref 0.1–1.2)
ALBUMIN SERPL-MCNC: 2.8 G/DL (ref 3.5–5.2)
ALBUMIN/GLOBULIN RATIO: 0.8 (ref 1–2.5)
ALP BLD-CCNC: 160 U/L (ref 35–104)
ALT SERPL-CCNC: 42 U/L (ref 5–33)
ANION GAP SERPL CALCULATED.3IONS-SCNC: 14 MMOL/L (ref 9–17)
AST SERPL-CCNC: 24 U/L
BASOPHILS # BLD: 0 % (ref 0–2)
BASOPHILS ABSOLUTE: 0.04 K/UL (ref 0–0.2)
BILIRUB SERPL-MCNC: 0.32 MG/DL (ref 0.3–1.2)
BILIRUBIN DIRECT: 0.11 MG/DL
BILIRUBIN, INDIRECT: 0.21 MG/DL (ref 0–1)
BUN BLDV-MCNC: 20 MG/DL (ref 8–23)
BUN/CREAT BLD: ABNORMAL (ref 9–20)
CALCIUM SERPL-MCNC: 9.1 MG/DL (ref 8.6–10.4)
CHLORIDE BLD-SCNC: 103 MMOL/L (ref 98–107)
CO2: 20 MMOL/L (ref 20–31)
CREAT SERPL-MCNC: 0.67 MG/DL (ref 0.5–0.9)
DIFFERENTIAL TYPE: ABNORMAL
EOSINOPHILS RELATIVE PERCENT: 0 % (ref 1–4)
GFR AFRICAN AMERICAN: >60 ML/MIN
GFR NON-AFRICAN AMERICAN: >60 ML/MIN
GFR SERPL CREATININE-BSD FRML MDRD: ABNORMAL ML/MIN/{1.73_M2}
GFR SERPL CREATININE-BSD FRML MDRD: ABNORMAL ML/MIN/{1.73_M2}
GLOBULIN: ABNORMAL G/DL (ref 1.5–3.8)
GLUCOSE BLD-MCNC: 140 MG/DL (ref 65–105)
GLUCOSE BLD-MCNC: 147 MG/DL (ref 65–105)
GLUCOSE BLD-MCNC: 152 MG/DL (ref 70–99)
HCT VFR BLD CALC: 33.9 % (ref 36.3–47.1)
HEMOGLOBIN: 10.2 G/DL (ref 11.9–15.1)
IMMATURE GRANULOCYTES: 1 %
LYMPHOCYTES # BLD: 6 % (ref 24–43)
MCH RBC QN AUTO: 27.6 PG (ref 25.2–33.5)
MCHC RBC AUTO-ENTMCNC: 30.1 G/DL (ref 28.4–34.8)
MCV RBC AUTO: 91.6 FL (ref 82.6–102.9)
MONOCYTES # BLD: 8 % (ref 3–12)
NRBC AUTOMATED: 0 PER 100 WBC
PDW BLD-RTO: 18.7 % (ref 11.8–14.4)
PLATELET # BLD: 297 K/UL (ref 138–453)
PLATELET ESTIMATE: ABNORMAL
PMV BLD AUTO: 12 FL (ref 8.1–13.5)
POTASSIUM SERPL-SCNC: 4.1 MMOL/L (ref 3.7–5.3)
PROCALCITONIN: 0.53 NG/ML
RBC # BLD: 3.7 M/UL (ref 3.95–5.11)
RBC # BLD: ABNORMAL 10*6/UL
SEG NEUTROPHILS: 86 % (ref 36–65)
SEGMENTED NEUTROPHILS ABSOLUTE COUNT: 14.32 K/UL (ref 1.5–8.1)
SODIUM BLD-SCNC: 137 MMOL/L (ref 135–144)
TOTAL PROTEIN: 6.1 G/DL (ref 6.4–8.3)
WBC # BLD: 16.7 K/UL (ref 3.5–11.3)
WBC # BLD: ABNORMAL 10*3/UL

## 2020-05-23 PROCEDURE — 85025 COMPLETE CBC W/AUTO DIFF WBC: CPT

## 2020-05-23 PROCEDURE — 2580000003 HC RX 258: Performed by: SURGERY

## 2020-05-23 PROCEDURE — 6360000002 HC RX W HCPCS: Performed by: SURGERY

## 2020-05-23 PROCEDURE — 6370000000 HC RX 637 (ALT 250 FOR IP): Performed by: SURGERY

## 2020-05-23 PROCEDURE — 84145 PROCALCITONIN (PCT): CPT

## 2020-05-23 PROCEDURE — 2700000000 HC OXYGEN THERAPY PER DAY

## 2020-05-23 PROCEDURE — 2500000003 HC RX 250 WO HCPCS: Performed by: FAMILY MEDICINE

## 2020-05-23 PROCEDURE — 99232 SBSQ HOSP IP/OBS MODERATE 35: CPT | Performed by: HOSPITALIST

## 2020-05-23 PROCEDURE — 36415 COLL VENOUS BLD VENIPUNCTURE: CPT

## 2020-05-23 PROCEDURE — 6360000002 HC RX W HCPCS: Performed by: STUDENT IN AN ORGANIZED HEALTH CARE EDUCATION/TRAINING PROGRAM

## 2020-05-23 PROCEDURE — 87040 BLOOD CULTURE FOR BACTERIA: CPT

## 2020-05-23 PROCEDURE — 6360000002 HC RX W HCPCS: Performed by: INTERNAL MEDICINE

## 2020-05-23 PROCEDURE — 1200000000 HC SEMI PRIVATE

## 2020-05-23 PROCEDURE — 6360000002 HC RX W HCPCS: Performed by: HOSPITALIST

## 2020-05-23 PROCEDURE — 82947 ASSAY GLUCOSE BLOOD QUANT: CPT

## 2020-05-23 PROCEDURE — 80048 BASIC METABOLIC PNL TOTAL CA: CPT

## 2020-05-23 PROCEDURE — 80076 HEPATIC FUNCTION PANEL: CPT

## 2020-05-23 PROCEDURE — 2580000003 HC RX 258: Performed by: INTERNAL MEDICINE

## 2020-05-23 PROCEDURE — 99232 SBSQ HOSP IP/OBS MODERATE 35: CPT | Performed by: INTERNAL MEDICINE

## 2020-05-23 PROCEDURE — C9113 INJ PANTOPRAZOLE SODIUM, VIA: HCPCS | Performed by: SURGERY

## 2020-05-23 PROCEDURE — 94761 N-INVAS EAR/PLS OXIMETRY MLT: CPT

## 2020-05-23 RX ORDER — MORPHINE SULFATE 2 MG/ML
2 INJECTION, SOLUTION INTRAMUSCULAR; INTRAVENOUS
Status: DISCONTINUED | OUTPATIENT
Start: 2020-05-23 | End: 2020-05-26

## 2020-05-23 RX ORDER — MORPHINE SULFATE 4 MG/ML
4 INJECTION, SOLUTION INTRAMUSCULAR; INTRAVENOUS
Status: DISCONTINUED | OUTPATIENT
Start: 2020-05-23 | End: 2020-05-25

## 2020-05-23 RX ORDER — LORAZEPAM 2 MG/ML
INJECTION INTRAMUSCULAR
Status: DISPENSED
Start: 2020-05-23 | End: 2020-05-23

## 2020-05-23 RX ORDER — FUROSEMIDE 10 MG/ML
20 INJECTION INTRAMUSCULAR; INTRAVENOUS ONCE
Status: COMPLETED | OUTPATIENT
Start: 2020-05-23 | End: 2020-05-23

## 2020-05-23 RX ADMIN — GABAPENTIN 300 MG: 300 CAPSULE ORAL at 09:45

## 2020-05-23 RX ADMIN — LORAZEPAM 1 MG: 2 INJECTION INTRAMUSCULAR at 11:35

## 2020-05-23 RX ADMIN — MORPHINE SULFATE 4 MG: 4 INJECTION INTRAVENOUS at 06:10

## 2020-05-23 RX ADMIN — MORPHINE SULFATE 2 MG: 2 INJECTION, SOLUTION INTRAMUSCULAR; INTRAVENOUS at 00:07

## 2020-05-23 RX ADMIN — ESCITALOPRAM OXALATE 20 MG: 10 TABLET ORAL at 09:45

## 2020-05-23 RX ADMIN — SUCRALFATE 1 G: 1 TABLET ORAL at 17:59

## 2020-05-23 RX ADMIN — ONDANSETRON 4 MG: 2 INJECTION INTRAMUSCULAR; INTRAVENOUS at 06:10

## 2020-05-23 RX ADMIN — ENOXAPARIN SODIUM 40 MG: 40 INJECTION SUBCUTANEOUS at 09:45

## 2020-05-23 RX ADMIN — AMITRIPTYLINE HYDROCHLORIDE 25 MG: 25 TABLET, FILM COATED ORAL at 15:10

## 2020-05-23 RX ADMIN — OXYCODONE HYDROCHLORIDE AND ACETAMINOPHEN 1 TABLET: 5; 325 TABLET ORAL at 15:21

## 2020-05-23 RX ADMIN — SODIUM CHLORIDE, PRESERVATIVE FREE 10 ML: 5 INJECTION INTRAVENOUS at 10:02

## 2020-05-23 RX ADMIN — POTASSIUM CHLORIDE: 2 INJECTION, SOLUTION, CONCENTRATE INTRAVENOUS at 17:50

## 2020-05-23 RX ADMIN — MORPHINE SULFATE 2 MG: 2 INJECTION, SOLUTION INTRAMUSCULAR; INTRAVENOUS at 03:03

## 2020-05-23 RX ADMIN — Medication 10 ML: at 10:01

## 2020-05-23 RX ADMIN — MORPHINE SULFATE 4 MG: 4 INJECTION INTRAVENOUS at 22:36

## 2020-05-23 RX ADMIN — OXYCODONE HYDROCHLORIDE AND ACETAMINOPHEN 1 TABLET: 5; 325 TABLET ORAL at 19:00

## 2020-05-23 RX ADMIN — METOCLOPRAMIDE 10 MG: 5 INJECTION, SOLUTION INTRAMUSCULAR; INTRAVENOUS at 22:36

## 2020-05-23 RX ADMIN — ONDANSETRON 4 MG: 2 INJECTION INTRAMUSCULAR; INTRAVENOUS at 15:21

## 2020-05-23 RX ADMIN — MEROPENEM 1 G: 1 INJECTION, POWDER, FOR SOLUTION INTRAVENOUS at 18:42

## 2020-05-23 RX ADMIN — GABAPENTIN 300 MG: 300 CAPSULE ORAL at 15:10

## 2020-05-23 RX ADMIN — METOPROLOL SUCCINATE 25 MG: 25 TABLET, FILM COATED, EXTENDED RELEASE ORAL at 09:45

## 2020-05-23 RX ADMIN — BUSPIRONE HYDROCHLORIDE 20 MG: 10 TABLET ORAL at 15:10

## 2020-05-23 RX ADMIN — ONDANSETRON 4 MG: 2 INJECTION INTRAMUSCULAR; INTRAVENOUS at 22:15

## 2020-05-23 RX ADMIN — BUSPIRONE HYDROCHLORIDE 20 MG: 10 TABLET ORAL at 09:45

## 2020-05-23 RX ADMIN — METOCLOPRAMIDE 10 MG: 5 INJECTION, SOLUTION INTRAMUSCULAR; INTRAVENOUS at 17:59

## 2020-05-23 RX ADMIN — PANTOPRAZOLE SODIUM 40 MG: 40 INJECTION, POWDER, LYOPHILIZED, FOR SOLUTION INTRAVENOUS at 23:21

## 2020-05-23 RX ADMIN — TAMSULOSIN HYDROCHLORIDE 0.4 MG: 0.4 CAPSULE ORAL at 09:44

## 2020-05-23 RX ADMIN — BISACODYL 10 MG: 10 SUPPOSITORY RECTAL at 09:45

## 2020-05-23 RX ADMIN — SODIUM CHLORIDE, PRESERVATIVE FREE 10 ML: 5 INJECTION INTRAVENOUS at 22:15

## 2020-05-23 RX ADMIN — CLONAZEPAM 0.5 MG: 0.5 TABLET ORAL at 08:24

## 2020-05-23 RX ADMIN — MORPHINE SULFATE 4 MG: 4 INJECTION INTRAVENOUS at 08:16

## 2020-05-23 RX ADMIN — PANTOPRAZOLE SODIUM 40 MG: 40 INJECTION, POWDER, LYOPHILIZED, FOR SOLUTION INTRAVENOUS at 09:45

## 2020-05-23 RX ADMIN — SODIUM CHLORIDE, PRESERVATIVE FREE 10 ML: 5 INJECTION INTRAVENOUS at 09:46

## 2020-05-23 RX ADMIN — LORAZEPAM 1 MG: 2 INJECTION INTRAMUSCULAR at 01:02

## 2020-05-23 RX ADMIN — METOCLOPRAMIDE 10 MG: 5 INJECTION, SOLUTION INTRAMUSCULAR; INTRAVENOUS at 09:45

## 2020-05-23 RX ADMIN — Medication 10 ML: at 23:22

## 2020-05-23 RX ADMIN — AMITRIPTYLINE HYDROCHLORIDE 25 MG: 25 TABLET, FILM COATED ORAL at 08:24

## 2020-05-23 RX ADMIN — FUROSEMIDE 20 MG: 10 INJECTION, SOLUTION INTRAMUSCULAR; INTRAVENOUS at 18:57

## 2020-05-23 ASSESSMENT — PAIN SCALES - GENERAL
PAINLEVEL_OUTOF10: 8
PAINLEVEL_OUTOF10: 8
PAINLEVEL_OUTOF10: 10
PAINLEVEL_OUTOF10: 5
PAINLEVEL_OUTOF10: 10
PAINLEVEL_OUTOF10: 5
PAINLEVEL_OUTOF10: 7
PAINLEVEL_OUTOF10: 6
PAINLEVEL_OUTOF10: 8
PAINLEVEL_OUTOF10: 10

## 2020-05-23 NOTE — PROGRESS NOTES
the right upper lobe. Patient was discharged on home oxygen on 2-28.     She presented back to [facility] ER on 2-29 with worsening shortness of breath, dyspnea as well as an episode of dark red emesis. Her SaO2 was 60 % in the ED. There was concern of aspiration. CTA chest done was negative for PE, showed bilateral pleural effusions and severe esophageal dilatation, surgery was consulted. Patient was transferred to [facility] for further management.     Patient was started on noninvasive ventilation and then intubated and admitted to the ICU. She improved, was extubated on 3-7 to high flow O2. Pt was transferred out of the ICU on 3-13.     On 3-14 pts hgb dropped to 6.3 and she developed coffee-ground then bright red emesis, and was again transferred back to medical ICU.     General surgery performed endoscopy on 3/17 which showed an ulcer at the GE junction and mild gastritis with no active bleeding. After the procedure she developed acute respiratory distress and a fever of 39.8. She was emergently intubated and started on Meropenem and Vancomycin.      ID is consulted for antibiotic management. Patient was continued on meropenem. Vancomycin D/C     S/P trach and PEG placement 3/27     Flat plate of abdomen 6-44-11 with nonobstructive gas pattern with retained enteric contrast in the colon  On TPN. Patient could not tolerate tube feeds. Has superficial venous clot Rt Mid forearm to antecubital      Tracheal exchange in OR done on 4-2-20.     Patient transferred to [place] on 4-6-20. Transferred back to [facility] on 5-12-20 because of persistent vomiting, abdominal pain. Concern for possible SBO.     Pt NPO on TPN. 5-15 Passed swallow study to Dys III w/thickened liquids  Per surgery, pt for esophagram to evaluate the hiatal hernia repair. If the esophagram appears normal--> we will plan for a PEGogram and evaluate whether the stomach is emptying appropriately.    · Patient with severe GERD   · She underwent not really able to answer my questions though she is awake and alert oriented to self. Review of Systems   Unable to perform ROS: Mental status change   Constitutional: Positive for diaphoresis (per the nurse). Psychiatric/Behavioral: Positive for confusion (per the nurse). Physical Examination :     /74   Pulse 114   Temp 98.6 °F (37 °C) (Oral)   Resp 24   Ht 5' 3\" (1.6 m)   Wt 178 lb (80.7 kg)   SpO2 93%   BMI 31.53 kg/m²     Temperature Range: Temp: 98.6 °F (37 °C) Temp  Av.8 °F (37.7 °C)  Min: 98.6 °F (37 °C)  Max: 100.8 °F (38.2 °C)      \"[x]\" Indicates a positive item  \"[]\" Indicates a negative item      Constitutional: [] Appears well-developed and well-nourished [] No apparent distress      [x] Abnormal-the patient appears frail and elderly   Mental status  [x] Alert and awake  [x] Oriented to person/place/time [x]Able to follow commands      Eyes:  EOM    []  Normal  [] Abnormal-  Sclera  [x]  Normal  [] Abnormal -         Discharge [x]  None visible  [] Abnormal -    HENT:   [x] Normocephalic, atraumatic.   [] Abnormal   [x] Mouth/Throat: Mucous membranes are moist.     External Ears [x] Normal  [] Abnormal-     Neck: [x] No visualized mass     Pulmonary/Chest: [x] Respiratory effort normal.  [x] No visualized signs of difficulty breathing or respiratory distress        [] Abnormal-       Neurological:        [x] No Facial Asymmetry (Cranial nerve 7 motor function) (limited exam to video visit)          [x] No gaze palsy        [] Abnormal-         Skin:        [x] No significant exanthematous lesions or discoloration noted on facial skin         [] Abnormal-            Psychiatric:       [x] Normal Affect [x] No Hallucinations        [] Abnormal-       Laboratory data:   I have independently reviewed the followinglabs:  CBC with Differential:   Recent Labs     20  0446 20  0547   WBC 7.4 16.7*   HGB 8.8* 10.2*   HCT 30.3* 33.9*    297   LYMPHOPCT 22* 6* MONOPCT 10 8     BMP:   Recent Labs     05/22/20  0446 05/23/20  0547    137   K 4.1 4.1    103   CO2 19* 20   BUN 22 20   CREATININE 0.73 0.67   MG 1.8  --      Hepatic Function Panel:   Recent Labs     05/21/20  0450 05/23/20  0547   PROT 5.9* 6.1*   LABALBU 2.8* 2.8*   BILIDIR  --  0.11   IBILI  --  0.21   BILITOT 0.20* 0.32   ALKPHOS 147* 160*   ALT 61* 42*   AST 31 24     No results for input(s): VANCOTROUGH in the last 72 hours. Lab Results   Component Value Date    .1 (H) 05/13/2020     No results found for: SEDRATE    No results for input(s): PROCAL in the last 72 hours. Imaging Studies:   No new imaging    Cultures:     Culture, Blood 1 [[id number]] Collected: 05/14/20 1105   Order Status: Completed Specimen: Blood Updated: 05/20/20 0407    Specimen Description . BLOOD    Special Requests L HAND 2ML    Culture NO GROWTH 6 DAYS   Culture, Blood 1 [[id number]] Collected: 05/14/20 1110   Order Status: Completed Specimen: Blood Updated: 05/20/20 0407    Specimen Description . BLOOD    Special Requests R HAND 2ML    Culture NO GROWTH 6 DAYS   Culture, Urine [[id number]] (Abnormal)  Collected: 05/13/20 1115   Order Status: Completed Specimen: Urine, clean catch Updated: 05/14/20 2142    Specimen Description . CLEAN CATCH URINE    Special Requests NOT REPORTED    Culture ENTEROCOCCUS FAECALIS 10 to 50,000 CFU/MLAbnormal    Enterococcus  faecalis (1)     Antibiotic Interpretation MOHAN Status    ampicillin Sensitive  Final     <=2  SUSCEPTIBLE   penicillin   Final     NOT REPORTED   ciprofloxacin Sensitive  Final     <=0.5  SUSCEPTIBLE   erythromycin   Final     NOT REPORTED   Gentamicin, High Level  NOT REPORTED Final    levofloxacin Sensitive  Final     1  SUSCEPTIBLE   linezolid   Final     NOT REPORTED   nitrofurantoin Sensitive  Final     <=16  SUSCEPTIBLE   Synercid   Final     NOT REPORTED   Streptomycin, Hi Level  NOT REPORTED Final    tetracycline Sensitive  Final     <=1  SUSCEPTIBLE tigecycline   Final     NOT REPORTED   vancomycin Sensitive  Final     1  SUSCEPTIBLE           Medications:      sodium chloride flush  10 mL Intravenous 2 times per day    sodium chloride flush  10 mL Intravenous 2 times per day    enoxaparin  40 mg Subcutaneous Daily    pantoprazole  40 mg Intravenous BID    And    sodium chloride (PF)  10 mL Intravenous BID    sucralfate  1 g Oral 4 times per day    metoprolol succinate  25 mg Oral Daily    [Held by provider] furosemide  20 mg Oral Daily    bisacodyl  10 mg Rectal Daily    clonazePAM  0.5 mg Oral BID    QUEtiapine  100 mg Oral Nightly    traZODone  100 mg Oral Nightly    tamsulosin  0.4 mg Oral Daily    metoclopramide  10 mg Intravenous Q6H    sodium chloride flush  10 mL Intravenous 2 times per day    amitriptyline  25 mg Oral TID    busPIRone  20 mg Oral TID    escitalopram  20 mg Oral Daily    gabapentin  300 mg Oral TID           [facility]  Mulugeta Roberts MD  [facility]  OFFICE: [phone]      Electronically signed by Mulugeta Roberts MD on 5/23/2020 at 4:41 PM  Thank you for allowing us to participate in the care of this patient. Please call with questions. Gem Charles is a 76 y.o. female being evaluated by a Virtual Visit (video visit) encounter to address concerns as mentioned above. A caregiver was present when appropriate. Due to this being a TeleHealth encounter ([facility]), evaluation of the following organ systems was limited: Vitals/Constitutional/EENT/Resp/CV/GI//MS/Neuro/Skin/Heme-Lymph-Imm.     Pursuant to the emergency declaration under the [address] [place] [address], [address] [place] [address] authority and the [employer] and [employer] Act, this Virtual Visit was conducted with patient's (and/or legal guardian's) consent, to reduce the patient's risk of exposure to COVID-19 and provide

## 2020-05-23 NOTE — PROGRESS NOTES
[facility]   Progress Note    Cece Farmer is a 76 y.o. female patient. Hospitalization Day:10    Patient had cholecystectomy today  Requiring a lot of pain medication according to the nursing staff  Just had her pain medication few minutes ago  Still complaining of diffuse abdominal pain  Abdomen is slightly distended  No vomiting  Liver enzymes before cholecystectomy has improved much from before indicating that the stent is working well  White blood count was normal  Have any fever during the night indicating no complication from our procedure      VITALS:  BP (!) 124/46   Pulse 110   Temp 98.2 °F (36.8 °C) (Oral)   Resp 18   Ht 5' 3\" (1.6 m)   Wt 178 lb (80.7 kg)   SpO2 100%   BMI 31.53 kg/m²   TEMPERATURE:  Current - Temp: 98.2 °F (36.8 °C); Max - Temp  Av.9 °F (36.1 °C)  Min: 92.9 °F (33.8 °C)  Max: 98.6 °F (37 °C)    Physical Assessment:  General appearance:  alert, cooperative and moderate emotional distress  Mental Status:  oriented to person, place and time and normal affect  Lungs:  clear to auscultation bilaterally, normal effort  Heart:  regular rate and rhythm, no murmur  Abdomen:  soft, nontender, nondistended, normal bowel sounds, no masses, hepatomegaly, splenomegaly  Extremities:  no edema, redness, tenderness in the calves  Skin:  no gross lesions, rashes, induration    Data Review:    Labs and Imaging:     CBC:  Recent Labs     20  0622 20  0450 20  0446   WBC 7.7 7.0 7.4   HGB 9.8* 8.8* 8.8*   MCV 94.4 94.2 94.1   RDW 18.9* 18.5* 18.6*    266 255       ANEMIA STUDIES:  No results for input(s): LABIRON, TIBC, FERRITIN, ARUXRXQA23, FOLATE, OCCULTBLD in the last 72 hours.     BMP:  Recent Labs     20  0622 20  0450 20  0446    139 135   K 4.1 4.3 4.1    104 103   CO2 21 21 19*   BUN 27* 25* 22   CREATININE 0.75 0.80 0.73   GLUCOSE 108* 110* 125*   CALCIUM 9.3 9.2 8.9       LFTS:  Recent Labs     20  0676 05/21/20  0450   ALKPHOS 162* 147*   ALT 80* 61*   AST 51* 31   BILITOT 0.30 0.20*   BILIDIR 0.13  --    LABALBU 2.7* 2.8*       Amylase/Lipase and Ammonia:  No results for input(s): AMYLASE, LIPASE, AMMONIA in the last 72 hours. Acute Hepatitis Panel:  Lab Results   Component Value Date    HEPBSAG NONREACTIVE 05/16/2020    HEPCAB NONREACTIVE 05/16/2020    HEPBIGM NONREACTIVE 05/16/2020    HEPAIGM NONREACTIVE 05/16/2020       HCV Genotype:  No results found for: HEPATITISCGENOTYPE    HCV Quantitative:  No results found for: HCVQNT    LIVER WORK UP:    AFP  No results found for: AFP    Alpha 1 antitrypsin   No results found for: A1A    Anti - Liver/Kidney Ab  No results found for: LIVER-KIDNEYMICROSOMALAB    TOÑO  Lab Results   Component Value Date    TOÑO NEGATIVE 05/16/2020       AMA  Lab Results   Component Value Date    MITOAB 9.7 05/16/2020       ASMA  Lab Results   Component Value Date    SMOOTHMUSCAB 15 05/16/2020       Ceruloplasmin  No results found for: CERULOPLSM    Celiac panel  No results found for: TISSTRNTIIGG, TTGIGA, IGA    PT/INR  No results for input(s): PROTIME, INR in the last 72 hours. Cancer Markers:  CEA:  No results for input(s): CEA in the last 72 hours. Ca 125:  No results for input(s):  in the last 72 hours. Ca 19-9:   Invalid input(s):   AFP: No results for input(s): AFP in the last 72 hours.   Lactic acid:Invalid input(s): LACTIC ACID    Radiology Review:    5/18/2020 MRI:  FINDINGS:   Liver appears normal in contour with mild hepatic steatosis.  No intrahepatic   bile duct dilatation.  No abnormal T2 lesion is seen to suggest underlying   mass.       The gallbladder demonstrates cholelithiasis, confirmed on the ultrasound.  No   pericholecystic fluid.  No gallbladder wall thickening.  The CBD appears   mildly prominent measuring 8 mm in greatest dimension with a questionable   filling defect seen within the distal CBD at the level of the ampulla best   seen on series 9,

## 2020-05-23 NOTE — PROGRESS NOTES
Nutrition Assessment (Parenteral Nutrition)    Type and Reason for Visit: Reassess    Nutrition Recommendations:   -Continue with current TPN-no changes made to next bag  -Monitor labs and adjust as needed  -Encourage po intake    Nutrition Assessment: Discussed with RN-reports to continue TPN-states pt still taking in minimal po . Malnutrition Assessment:  · Malnutrition Status: At risk for malnutrition  · Context: Acute illness or injury  · Findings of the 6 clinical characteristics of malnutrition (Minimum of 2 out of 6 clinical characteristics is required to make the diagnosis of moderate or severe Protein Calorie Malnutrition based on AND/ASPEN Guidelines):  1. Energy Intake-Greater than 75% of estimated energy requirement, Greater than or equal to 1 month(w/ TPN)    2. Weight Loss-No significant weight loss,    3. Fat Loss-No significant subcutaneous fat loss,    4. Muscle Loss-No significant muscle mass loss,    5. Fluid Accumulation-No significant fluid accumulation,    6.   Strength-Not measured    Nutrition Risk Level: High    Nutrient Needs:  · Estimated Daily Total Kcal: 1.2-1.3 ~> 7466-8072 kcals/d   · Estimated Daily Protein (g): 1.2-1.4 gm/kg ~> 60-75 gms/d     Nutrition Diagnosis:   · Problem: Inadequate oral intake  · Etiology: related to Alteration in GI function     Signs and symptoms:  as evidenced by NPO status due to medical condition, Nutrition support - PN    Objective Information:  · Nutrition-Focused Physical Findings: meds/labs reviewed  · Wound Type: Open Wounds  · Current Nutrition Therapies:  · Oral Diet Orders: Clear Liquid   · Oral Diet intake: 1-25%  · Oral Nutrition Supplement (ONS) Orders: Clear Liquid Oral Supplement  · ONS intake: 1-25%  · Parenteral Nutrition Orders:  · Type and Formula: 2-in-1 Custom   · Lipids: None  · Rate/Volume: 60 mL/hr   · Duration: Continuous  · Current PN Order Provides: 325 gms dextrose + 75 gms AA ~> 1405 kcals, 75 gms protein   · Goal PN Orders Provides: 325 gms dextrose + 75 gms AA ~> 1405 kcals, 75 gms protein   · Additional Calories: D5% with 0.45% NaCl at 20ml per hr=82 kcal  · Anthropometric Measures:  · Ht: 5' 3\" (160 cm)   · Current Body Wt: 178 lb (80.7 kg)  · Admission Body Wt: 181 lb (82.1 kg)  · Usual Body Wt: 158 lb (71.7 kg)(per pt )  · % Weight Change:  ,  179-186 lbs over 3 mo per EMR   · Ideal Body Wt: 115 lb (52.2 kg), % Ideal Body 157% adm/ideal  · BMI Classification: BMI 30.0 - 34.9 Obese Class I    Nutrition Interventions:   Continue current diet, Continue current ONS, Continue Parenteral Nutrition  Continued Inpatient Monitoring, Education Not Indicated    Nutrition Evaluation:   · Evaluation: Goal achieved   · Goals: meet % of estimated nutrition needs with nutrition support/oral diet   · Monitoring: Nutrition Progression, Meal Intake, Supplement Intake, PN Intake, PN Tolerance, Weight, Pertinent Labs, I&O, Monitor Bowel Function      Electronically signed by Christian Davis RD, LD on 5/23/20 at 1:55 PM EDT    Contact Number: [phone]

## 2020-05-24 ENCOUNTER — APPOINTMENT (OUTPATIENT)
Dept: GENERAL RADIOLOGY | Age: 75
DRG: 326 | End: 2020-05-24
Attending: FAMILY MEDICINE
Payer: MEDICARE

## 2020-05-24 LAB
ABSOLUTE EOS #: 0.05 K/UL (ref 0–0.44)
ABSOLUTE IMMATURE GRANULOCYTE: 0.15 K/UL (ref 0–0.3)
ABSOLUTE LYMPH #: 0.99 K/UL (ref 1.1–3.7)
ABSOLUTE MONO #: 1.22 K/UL (ref 0.1–1.2)
BASOPHILS # BLD: 0 % (ref 0–2)
BASOPHILS ABSOLUTE: 0.03 K/UL (ref 0–0.2)
DIFFERENTIAL TYPE: ABNORMAL
EOSINOPHILS RELATIVE PERCENT: 0 % (ref 1–4)
GLUCOSE BLD-MCNC: 131 MG/DL (ref 65–105)
GLUCOSE BLD-MCNC: 144 MG/DL (ref 65–105)
GLUCOSE BLD-MCNC: 92 MG/DL (ref 65–105)
HCT VFR BLD CALC: 27.5 % (ref 36.3–47.1)
HEMOGLOBIN: 8.1 G/DL (ref 11.9–15.1)
IMMATURE GRANULOCYTES: 1 %
LYMPHOCYTES # BLD: 6 % (ref 24–43)
MCH RBC QN AUTO: 27.4 PG (ref 25.2–33.5)
MCHC RBC AUTO-ENTMCNC: 29.5 G/DL (ref 28.4–34.8)
MCV RBC AUTO: 92.9 FL (ref 82.6–102.9)
MONOCYTES # BLD: 8 % (ref 3–12)
NRBC AUTOMATED: 0 PER 100 WBC
PDW BLD-RTO: 18.3 % (ref 11.8–14.4)
PLATELET # BLD: 246 K/UL (ref 138–453)
PLATELET ESTIMATE: ABNORMAL
PMV BLD AUTO: 11.7 FL (ref 8.1–13.5)
RBC # BLD: 2.96 M/UL (ref 3.95–5.11)
RBC # BLD: ABNORMAL 10*6/UL
SEG NEUTROPHILS: 85 % (ref 36–65)
SEGMENTED NEUTROPHILS ABSOLUTE COUNT: 13.25 K/UL (ref 1.5–8.1)
WBC # BLD: 15.7 K/UL (ref 3.5–11.3)
WBC # BLD: ABNORMAL 10*3/UL

## 2020-05-24 PROCEDURE — 6360000002 HC RX W HCPCS: Performed by: SURGERY

## 2020-05-24 PROCEDURE — 6360000002 HC RX W HCPCS: Performed by: INTERNAL MEDICINE

## 2020-05-24 PROCEDURE — 6360000002 HC RX W HCPCS: Performed by: STUDENT IN AN ORGANIZED HEALTH CARE EDUCATION/TRAINING PROGRAM

## 2020-05-24 PROCEDURE — C9113 INJ PANTOPRAZOLE SODIUM, VIA: HCPCS | Performed by: SURGERY

## 2020-05-24 PROCEDURE — 85025 COMPLETE CBC W/AUTO DIFF WBC: CPT

## 2020-05-24 PROCEDURE — 6370000000 HC RX 637 (ALT 250 FOR IP): Performed by: SURGERY

## 2020-05-24 PROCEDURE — 2580000003 HC RX 258: Performed by: SURGERY

## 2020-05-24 PROCEDURE — 1200000000 HC SEMI PRIVATE

## 2020-05-24 PROCEDURE — 82947 ASSAY GLUCOSE BLOOD QUANT: CPT

## 2020-05-24 PROCEDURE — 71045 X-RAY EXAM CHEST 1 VIEW: CPT

## 2020-05-24 PROCEDURE — 99232 SBSQ HOSP IP/OBS MODERATE 35: CPT | Performed by: HOSPITALIST

## 2020-05-24 PROCEDURE — 74018 RADEX ABDOMEN 1 VIEW: CPT

## 2020-05-24 PROCEDURE — 99233 SBSQ HOSP IP/OBS HIGH 50: CPT | Performed by: INTERNAL MEDICINE

## 2020-05-24 PROCEDURE — 2500000003 HC RX 250 WO HCPCS: Performed by: FAMILY MEDICINE

## 2020-05-24 PROCEDURE — 2700000000 HC OXYGEN THERAPY PER DAY

## 2020-05-24 PROCEDURE — 6370000000 HC RX 637 (ALT 250 FOR IP): Performed by: INTERNAL MEDICINE

## 2020-05-24 PROCEDURE — 36415 COLL VENOUS BLD VENIPUNCTURE: CPT

## 2020-05-24 PROCEDURE — 2580000003 HC RX 258: Performed by: INTERNAL MEDICINE

## 2020-05-24 RX ORDER — ACETAMINOPHEN 650 MG/1
650 SUPPOSITORY RECTAL EVERY 4 HOURS PRN
Status: DISCONTINUED | OUTPATIENT
Start: 2020-05-24 | End: 2020-06-02 | Stop reason: HOSPADM

## 2020-05-24 RX ADMIN — MEROPENEM 1 G: 1 INJECTION, POWDER, FOR SOLUTION INTRAVENOUS at 09:34

## 2020-05-24 RX ADMIN — METOCLOPRAMIDE 10 MG: 5 INJECTION, SOLUTION INTRAMUSCULAR; INTRAVENOUS at 15:37

## 2020-05-24 RX ADMIN — MORPHINE SULFATE 4 MG: 4 INJECTION INTRAVENOUS at 09:23

## 2020-05-24 RX ADMIN — ACETAMINOPHEN 650 MG: 650 SUPPOSITORY RECTAL at 16:48

## 2020-05-24 RX ADMIN — SUCRALFATE 1 G: 1 TABLET ORAL at 06:03

## 2020-05-24 RX ADMIN — Medication 10 ML: at 22:17

## 2020-05-24 RX ADMIN — PANTOPRAZOLE SODIUM 40 MG: 40 INJECTION, POWDER, LYOPHILIZED, FOR SOLUTION INTRAVENOUS at 22:17

## 2020-05-24 RX ADMIN — BUSPIRONE HYDROCHLORIDE 20 MG: 10 TABLET ORAL at 09:52

## 2020-05-24 RX ADMIN — CLONAZEPAM 0.5 MG: 0.5 TABLET ORAL at 22:18

## 2020-05-24 RX ADMIN — Medication 10 ML: at 09:53

## 2020-05-24 RX ADMIN — ENOXAPARIN SODIUM 40 MG: 40 INJECTION SUBCUTANEOUS at 09:22

## 2020-05-24 RX ADMIN — ONDANSETRON 4 MG: 2 INJECTION INTRAMUSCULAR; INTRAVENOUS at 17:05

## 2020-05-24 RX ADMIN — CLONAZEPAM 0.5 MG: 0.5 TABLET ORAL at 09:52

## 2020-05-24 RX ADMIN — ONDANSETRON 4 MG: 2 INJECTION INTRAMUSCULAR; INTRAVENOUS at 09:38

## 2020-05-24 RX ADMIN — AMITRIPTYLINE HYDROCHLORIDE 25 MG: 25 TABLET, FILM COATED ORAL at 22:18

## 2020-05-24 RX ADMIN — LORAZEPAM 1 MG: 2 INJECTION INTRAMUSCULAR at 15:37

## 2020-05-24 RX ADMIN — TAMSULOSIN HYDROCHLORIDE 0.4 MG: 0.4 CAPSULE ORAL at 09:52

## 2020-05-24 RX ADMIN — GABAPENTIN 300 MG: 300 CAPSULE ORAL at 22:18

## 2020-05-24 RX ADMIN — BISACODYL 10 MG: 10 SUPPOSITORY RECTAL at 09:58

## 2020-05-24 RX ADMIN — SODIUM CHLORIDE, PRESERVATIVE FREE 10 ML: 5 INJECTION INTRAVENOUS at 09:53

## 2020-05-24 RX ADMIN — TRAZODONE HYDROCHLORIDE 100 MG: 100 TABLET ORAL at 22:18

## 2020-05-24 RX ADMIN — METOCLOPRAMIDE 10 MG: 5 INJECTION, SOLUTION INTRAMUSCULAR; INTRAVENOUS at 22:18

## 2020-05-24 RX ADMIN — METOCLOPRAMIDE 10 MG: 5 INJECTION, SOLUTION INTRAMUSCULAR; INTRAVENOUS at 09:22

## 2020-05-24 RX ADMIN — GABAPENTIN 300 MG: 300 CAPSULE ORAL at 09:52

## 2020-05-24 RX ADMIN — POTASSIUM CHLORIDE: 2 INJECTION, SOLUTION, CONCENTRATE INTRAVENOUS at 17:49

## 2020-05-24 RX ADMIN — PANTOPRAZOLE SODIUM 40 MG: 40 INJECTION, POWDER, LYOPHILIZED, FOR SOLUTION INTRAVENOUS at 09:22

## 2020-05-24 RX ADMIN — METOCLOPRAMIDE 10 MG: 5 INJECTION, SOLUTION INTRAMUSCULAR; INTRAVENOUS at 04:37

## 2020-05-24 RX ADMIN — SODIUM CHLORIDE, PRESERVATIVE FREE 10 ML: 5 INJECTION INTRAVENOUS at 22:19

## 2020-05-24 RX ADMIN — METOPROLOL SUCCINATE 25 MG: 25 TABLET, FILM COATED, EXTENDED RELEASE ORAL at 09:58

## 2020-05-24 RX ADMIN — MEROPENEM 1 G: 1 INJECTION, POWDER, FOR SOLUTION INTRAVENOUS at 01:38

## 2020-05-24 RX ADMIN — AMITRIPTYLINE HYDROCHLORIDE 25 MG: 25 TABLET, FILM COATED ORAL at 09:52

## 2020-05-24 RX ADMIN — QUETIAPINE FUMARATE 100 MG: 100 TABLET ORAL at 22:18

## 2020-05-24 RX ADMIN — LORAZEPAM 1 MG: 2 INJECTION INTRAMUSCULAR at 09:38

## 2020-05-24 RX ADMIN — MEROPENEM 1 G: 1 INJECTION, POWDER, FOR SOLUTION INTRAVENOUS at 17:05

## 2020-05-24 RX ADMIN — BUSPIRONE HYDROCHLORIDE 20 MG: 10 TABLET ORAL at 22:18

## 2020-05-24 RX ADMIN — ESCITALOPRAM OXALATE 20 MG: 10 TABLET ORAL at 09:52

## 2020-05-24 ASSESSMENT — ENCOUNTER SYMPTOMS
CHEST TIGHTNESS: 1
COUGH: 1
VOMITING: 1
ABDOMINAL PAIN: 1

## 2020-05-24 ASSESSMENT — PAIN SCALES - GENERAL
PAINLEVEL_OUTOF10: 4
PAINLEVEL_OUTOF10: 9
PAINLEVEL_OUTOF10: 7

## 2020-05-24 NOTE — PROGRESS NOTES
Nutrition Assessment (Parenteral Nutrition)    Type and Reason for Visit: Reassess    Nutrition Recommendations:   -Continue with current TPN-no changes made to next bag  -Monitor labs/adjust as needed  -Encourage po intake of meals/ONS    Nutrition Assessment: Emesis x2 overnight. Plan to continue TPN-minimal po intake. No new labs today-labs ordered MW    Malnutrition Assessment:  · Malnutrition Status: At risk for malnutrition  · Context: Acute illness or injury  · Findings of the 6 clinical characteristics of malnutrition (Minimum of 2 out of 6 clinical characteristics is required to make the diagnosis of moderate or severe Protein Calorie Malnutrition based on AND/ASPEN Guidelines):  1. Energy Intake-Greater than 75% of estimated energy requirement, Greater than or equal to 1 month(w/ TPN)    2. Weight Loss-No significant weight loss,    3. Fat Loss-No significant subcutaneous fat loss,    4. Muscle Loss-No significant muscle mass loss,    5. Fluid Accumulation-No significant fluid accumulation,    6.   Strength-Not measured    Nutrition Risk Level: High    Nutrient Needs:  · Estimated Daily Total Kcal: 1.2-1.3 ~> 2048-1565 kcals/d   · Estimated Daily Protein (g): 1.2-1.4 gm/kg ~> 60-75 gms/d     Nutrition Diagnosis:   · Problem: Inadequate oral intake  · Etiology: related to Alteration in GI function     Signs and symptoms:  as evidenced by NPO status due to medical condition, Nutrition support - PN    Objective Information:  · Nutrition-Focused Physical Findings: meds/labs reviewed  · Wound Type: Open Wounds  · Current Nutrition Therapies:  · Oral Diet Orders: Clear Liquid   · Oral Diet intake: 1-25%  · Oral Nutrition Supplement (ONS) Orders: Clear Liquid Oral Supplement  · ONS intake: 1-25%  · Parenteral Nutrition Orders:  · Type and Formula: 2-in-1 Custom   · Lipids: None  · Rate/Volume: 60 mL/hr   · Duration: Continuous  · Current PN Order Provides: 325 gms dextrose + 75 gms AA ~> 1405 kcals, 75

## 2020-05-24 NOTE — PROGRESS NOTES
Antibiotic Interpretation MOHAN Status    ampicillin Sensitive  Final     <=2  SUSCEPTIBLE   penicillin   Final     NOT REPORTED   ciprofloxacin Sensitive  Final     <=0.5  SUSCEPTIBLE   erythromycin   Final     NOT REPORTED   Gentamicin, High Level  NOT REPORTED Final    levofloxacin Sensitive  Final     1  SUSCEPTIBLE   linezolid   Final     NOT REPORTED   nitrofurantoin Sensitive  Final     <=16  SUSCEPTIBLE   Synercid   Final     NOT REPORTED   Streptomycin, Hi Level  NOT REPORTED Final    tetracycline Sensitive  Final     <=1  SUSCEPTIBLE   tigecycline   Final     NOT REPORTED   vancomycin Sensitive  Final     1  SUSCEPTIBLE           Medications:      meropenem  1 g Intravenous Q8H    sodium chloride flush  10 mL Intravenous 2 times per day    sodium chloride flush  10 mL Intravenous 2 times per day    enoxaparin  40 mg Subcutaneous Daily    pantoprazole  40 mg Intravenous BID    And    sodium chloride (PF)  10 mL Intravenous BID    sucralfate  1 g Oral 4 times per day    metoprolol succinate  25 mg Oral Daily    [Held by provider] furosemide  20 mg Oral Daily    bisacodyl  10 mg Rectal Daily    clonazePAM  0.5 mg Oral BID    QUEtiapine  100 mg Oral Nightly    traZODone  100 mg Oral Nightly    tamsulosin  0.4 mg Oral Daily    metoclopramide  10 mg Intravenous Q6H    sodium chloride flush  10 mL Intravenous 2 times per day    amitriptyline  25 mg Oral TID    busPIRone  20 mg Oral TID    escitalopram  20 mg Oral Daily    gabapentin  300 mg Oral TID           [facility]  Mariella Larsen MD  [facility]  OFFICE: [phone]      Electronically signed by Mariella Larsen MD on 5/24/2020 at 3:49 PM  Thank you for allowing us to participate in the care of this patient. Please call with questions. Enma Gama is a 76 y.o. female being evaluated by a Virtual Visit (video visit) encounter to address concerns as mentioned above.   A caregiver was

## 2020-05-24 NOTE — PROGRESS NOTES
Surgery Progress Note            PATIENT NAME: Rosemarie Lehmna     TODAY'S DATE: 5/24/2020, 8:29 AM    SUBJECTIVE:    Pt seen and examined. T max 38.2. Intermittent tachycardia to 110s. Overnight pt w/ emesis X2. Pain controlled. UOP remains adequate. Refusing certain meds due to bad taste. OBJECTIVE:   VITALS:  /72   Pulse 100   Temp 99.1 °F (37.3 °C) (Oral)   Resp 16   Ht 5' 3\" (1.6 m)   Wt 181 lb 2 oz (82.2 kg)   SpO2 94%   BMI 32.08 kg/m²      INTAKE/OUTPUT:      Intake/Output Summary (Last 24 hours) at 5/24/2020 4385  Last data filed at 5/24/2020 4342  Gross per 24 hour   Intake 3785 ml   Output 2875 ml   Net 910 ml       PHYSICAL EXAM  General Appearance: in no acute distress  Skin:  Warm and dry   Head/face:  NCAT and sclera anicteric   Lungs:  Respirations even and unlabored   Heart:  Heart regular rate and rhythm  Abdomen:  Soft, non-distended, minimally tender to palpation, improved from yesterday. PEG tube intact; no erythema or drainage at tube site. Incisions c/d/i w/ skin glue.    Extremities: no edema and no cyanosis          Data:  CBC with Differential:    Lab Results   Component Value Date    WBC 15.7 05/24/2020    RBC 2.96 05/24/2020    HGB 8.1 05/24/2020    HCT 27.5 05/24/2020     05/24/2020    MCV 92.9 05/24/2020    MCH 27.4 05/24/2020    MCHC 29.5 05/24/2020    RDW 18.3 05/24/2020    NRBC 1 05/12/2020    LYMPHOPCT 6 05/24/2020    MONOPCT 8 05/24/2020    BASOPCT 0 05/24/2020    MONOSABS 1.22 05/24/2020    LYMPHSABS 0.99 05/24/2020    EOSABS 0.05 05/24/2020    BASOSABS 0.03 05/24/2020    DIFFTYPE NOT REPORTED 05/24/2020     BMP:    Lab Results   Component Value Date     05/23/2020    K 4.1 05/23/2020     05/23/2020    CO2 20 05/23/2020    BUN 20 05/23/2020    LABALBU 2.8 05/23/2020    CREATININE 0.67 05/23/2020    CALCIUM 9.1 05/23/2020    GFRAA >60 05/23/2020    LABGLOM >60 05/23/2020    GLUCOSE 152 05/23/2020       Radiology Review:    EXAMINATION:   RIGHT UPPER

## 2020-05-24 NOTE — PROGRESS NOTES
with possibility of bowel obstruction, 2 months ago she had prolonged extensive hospital stay, at the end of February had a Nissen fundoplication with possible aspiration issues and was admitted and stayed in the hospital for 37 days during that time she was intubated twice had hematemesis and had EGD that showed gastric ulcer and was seen by a specialist and after long course she was successfully transitioned to a trach and PEG and discharged to [facility] on 4/6. This time the patient has been having increasing abdominal pain nausea and vomiting over that has been worsening since she was discharged over at [facility]  Patient had ongoing issues since then, had SBO that resolved , since then she has an issue  with tolerating TF,  a PICC line placed on 4/25 at [facility] and was startd TPN , given she had persistent N/V, TF held and  PEG tube has been down to gravity for the last couple of days. She also has been treated for UTI and finishing a course of Zyvox ordered by ID.     Anxiety continues overnight  VSS- modified barium   COVID swab  esogram possible if warranted by VSS; and possible peg tube eval  Fevers overnight prompting blood cultures x2 urinalysis with culture all pending at current time  Passed swallow study  UGI series show gastric outlet obstruction,GI consult added by surgery    Review of Systems:     Unable to collect review of system due to altered mental status. Medications: Allergies:     Allergies   Allergen Reactions    Prednisone Anxiety    Compazine [Prochlorperazine Maleate]     Penicillin V Potassium     Penicillins Other (See Comments)     shock       Current Meds:   Scheduled Meds:    meropenem  1 g Intravenous Q8H    sodium chloride flush  10 mL Intravenous 2 times per day    sodium chloride flush  10 mL Intravenous 2 times per day    enoxaparin  40 mg Subcutaneous Daily    pantoprazole  40 mg Intravenous BID    And    sodium chloride (PF)  10 mL Intravenous BID    sucralfate  1 g Oral 4 times per day    metoprolol succinate  25 mg Oral Daily    [Held by provider] furosemide  20 mg Oral Daily    bisacodyl  10 mg Rectal Daily    clonazePAM  0.5 mg Oral BID    QUEtiapine  100 mg Oral Nightly    traZODone  100 mg Oral Nightly    tamsulosin  0.4 mg Oral Daily    metoclopramide  10 mg Intravenous Q6H    sodium chloride flush  10 mL Intravenous 2 times per day    amitriptyline  25 mg Oral TID    busPIRone  20 mg Oral TID    escitalopram  20 mg Oral Daily    gabapentin  300 mg Oral TID     Continuous Infusions:    PN-Adult 2-in-1 Central Line (Standard) 60 mL/hr at 05/23/20 1750    lactated ringers 125 mL/hr at 05/22/20 1224    dextrose 5 % and 0.45 % NaCl 20 mL/hr at 05/22/20 0029     PRN Meds: morphine **OR** morphine, sodium chloride flush, fentanNYL, midazolam, sodium chloride flush, ondansetron, fentanNYL, fentanNYL, fentanNYL, fentanNYL, oxyCODONE-acetaminophen, labetalol, hydrALAZINE, LORazepam, acetaminophen, sodium chloride flush, potassium chloride **OR** potassium alternative oral replacement **OR** potassium chloride, magnesium sulfate, nicotine    Data:     Past Medical History:   has a past medical history of Anxiety, Arthritis, Back pain, Bronchitis, Caffeine use, Carpal tunnel syndrome, Cataracts, bilateral, Constipation, Depression, Diarrhea, GERD (gastroesophageal reflux disease), H/O gastric ulcer, Hyperlipidemia, Hypertension, Mumps, MVP (mitral valve prolapse), Recurrent UTI, Sinusitis, Tracheostomy in place [facility]), Ulcerative esophagitis, and Wellness examination. Social History:   reports that she has been smoking cigarettes. She has a 50.00 pack-year smoking history. She has never used smokeless tobacco. She reports that she does not drink alcohol or use drugs.      Family History:   Family History   Problem Relation Age of Onset    Cancer Mother         uterine    Heart Attack Mother     Heart Attack Father     Other Maternal Grandfather         foot gangrene    Other Paternal Grandmother         bleeding ulcers    Other Paternal Grandfather         lung cancer       Vitals:  BP (!) 108/56   Pulse 98   Temp 100.8 °F (38.2 °C) (Oral)   Resp 17   Ht 5' 3\" (1.6 m)   Wt 178 lb (80.7 kg)   SpO2 93%   BMI 31.53 kg/m²   Temp (24hrs), Av.8 °F (37.7 °C), Min:98.6 °F (37 °C), Max:100.8 °F (38.2 °C)    Recent Labs     20  17120  0015 20  1132 20  1709   POCGLU 98 132* 140* 147*       I/O (24Hr): Intake/Output Summary (Last 24 hours) at 2020 2017  Last data filed at 2020 1814  Gross per 24 hour   Intake 2608 ml   Output 2725 ml   Net -117 ml       Labs:  Hematology:  Recent Labs     20  0547   WBC 7.0 7.4 16.7*   RBC 3.29* 3.22* 3.70*   HGB 8.8* 8.8* 10.2*   HCT 31.0* 30.3* 33.9*   MCV 94.2 94.1 91.6   MCH 26.7 27.3 27.6   MCHC 28.4 29.0 30.1   RDW 18.5* 18.6* 18.7*    255 297   MPV 10.9 11.3 12.0     Chemistry:  Recent Labs     20  0446 20  0547    135 137   K 4.3 4.1 4.1    103 103   CO2 21 19* 20   GLUCOSE 110* 125* 152*   BUN 25* 22 20   CREATININE 0.80 0.73 0.67   MG  --  1.8  --    ANIONGAP 14 13 14   LABGLOM >60 >60 >60   GFRAA >60 >60 >60   CALCIUM 9.2 8.9 9.1   PHOS  --  3.3  --      Recent Labs     20  0450  20  0844 20  1137 20  1719 20  0015 20  0547 20  1132 20  1709   PROT 5.9*  --   --   --   --   --  6.1*  --   --    LABALBU 2.8*  --   --   --   --   --  2.8*  --   --    AST 31  --   --   --   --   --  24  --   --    ALT 61*  --   --   --   --   --  42*  --   --    ALKPHOS 147*  --   --   --   --   --  160*  --   --    BILITOT 0.20*  --   --   --   --   --  0.32  --   --    BILIDIR  --   --   --   --   --   --  0.11  --   --    TRIG 408*  --   --   --   --   --   --   --   --    POCGLU  --    < > 120* 123* 98 132*  --  140* 147*    < > = values in this interval not displayed. ABG:  Lab Results   Component Value Date    POCPH 7.439 04/06/2020    POCPCO2 41.9 04/06/2020    POCPO2 125.9 04/06/2020    POCHCO3 28.4 04/06/2020    NBEA NOT REPORTED 04/06/2020    PBEA 4 04/06/2020    UXI8WGQ 30 04/06/2020    IOJI5GAK 99 04/06/2020    FIO2 40.0 04/06/2020     Lab Results   Component Value Date/Time    SPECIAL R HAND 2ML 05/14/2020 11:10 AM     Lab Results   Component Value Date/Time    CULTURE NO GROWTH 6 DAYS 05/14/2020 11:10 AM       Radiology:  Fl Ercp Biliary S&i    Result Date: 5/21/2020  Intraprocedural fluoroscopic spot images as above. See separate procedure report for more information. Mri Abdomen Wo Contrast Mrcp    Result Date: 5/18/2020  1. Cholelithiasis. Mildly prominent CBD with a suspected filling defect seen within the distal CBD suggestive of choledocholithiasis. Further evaluation with ERCP is recommended. 2. Hepatic steatosis. 3. Moderate size hiatal hernia. Physical Examination:        General appearance: Patient appears weak. Patient is confused.   Mental Status: Postop delirium  Lungs:  clear to auscultation bilaterally, normal effort  Heart:  regular rate and rhythm, no murmur  Abdomen:  soft, nontender, nondistended, normal bowel sounds, no masses, hepatomegaly, splenomegaly  Extremities:  no edema, redness, tenderness in the calves  Skin:  no gross lesions, rashes, induration    Assessment:        Hospital Problems           Last Modified POA    * (Principal) Gastric outlet obstruction 5/16/2020 Yes    MVP (mitral valve prolapse) 5/12/2020 Yes    Dysphagia 5/22/2020 Yes    Centrilobular emphysema ([address] 5/12/2020 Yes    Fever 5/14/2020 Yes    Recurrent UTI 5/12/2020 Yes    Overview Signed 5/12/2020  5:22 PM by MD Dr. Ryan Diaz Bone         Tracheostomy in place [facility]) 5/12/2020 Yes    H/O gastric ulcer 5/12/2020 Yes    Hyperlipidemia 5/12/2020 Yes    Hypertension 5/12/2020 Yes    Tobacco abuse 5/12/2020 Yes    Chronic respiratory failure with

## 2020-05-24 NOTE — PLAN OF CARE
Ongoing     Problem: Skin Integrity:  Goal: Skin integrity will stabilize  Description: Skin integrity will stabilize  5/24/2020 1805 by Joseph Bose RN  Outcome: Ongoing  5/24/2020 0548 by Lacey Guzman RN  Outcome: Ongoing     Problem: Discharge Planning:  Goal: Patients continuum of care needs are met  Description: Patients continuum of care needs are met  5/24/2020 1805 by Joseph Bose RN  Outcome: Ongoing  5/24/2020 0548 by Lacey Guzman RN  Outcome: Ongoing     Problem: ABCDS Injury Assessment  Goal: Absence of physical injury  5/24/2020 1805 by Joseph Bose RN  Outcome: Ongoing  5/24/2020 0548 by Lacey Guzman RN  Outcome: Ongoing

## 2020-05-24 NOTE — PROGRESS NOTES
[facility]    Progress Note    5/24/2020    3:45 PM    Name:   Anuj Ram  MRN:     [id number]     Acct:      [de-identified]   Room:   [address] [place] [address] Day:  12  Admit Date:  5/12/2020  3:19 PM    PCP:   Kristen Franco MD  Code Status:  Full Code    Subjective:     C/C: No chief complaint on file. Patient Active Problem List   Diagnosis    Frequency of urination    Severe sepsis (HCC)    Back pain    GERD (gastroesophageal reflux disease)    MVP (mitral valve prolapse)    Depression    Anxiety    Urine retention    Acute kidney injury ([address]    Dysphagia    Paraesophageal hernia    History of repair of hiatal hernia    Aspiration pneumonia (HCC)    Respiratory failure (HCC)    Centrilobular emphysema (HCC)    Atelectasis    Pleural effusion    Esophageal dilatation    Upper GI bleeding    Acute on chronic blood loss anemia    Shock ([address]    Fever    Acute postoperative respiratory insufficiency    Critical illness polyneuropathy (HCC)    Gastric outlet obstruction    Recurrent UTI    Tracheostomy in place ([address]    H/O gastric ulcer    Hyperlipidemia    Hypertension    Tobacco abuse    Chronic respiratory failure with hypoxia (HCC)    Status post insertion of percutaneous endoscopic gastrostomy (PEG) tube (HCC)    S/P Nissen fundoplication (without gastrostomy tube) procedure    Paralytic ileus ([address]    Elevated liver enzymes     Interval History Status: not changed. Patient was seen and examined at bedside. Patient remains to be somewhat delirious. Patient had nausea and vomiting this morning.     Brief History:     Silvio Roldan a 76 y.o. Non-/non  female who presents with No chief complaint on file.   and is admitted to the hospital for the management of SBO  Patient transferred to our facility from [facility] with possibility of bowel obstruction, 2 months ago she had prolonged 132*  --  140* 147* 131* 144* 92     ABG:  Lab Results   Component Value Date    POCPH 7.439 04/06/2020    POCPCO2 41.9 04/06/2020    POCPO2 125.9 04/06/2020    POCHCO3 28.4 04/06/2020    NBEA NOT REPORTED 04/06/2020    PBEA 4 04/06/2020    PEJ4WIL 30 04/06/2020    VHPB0VXJ 99 04/06/2020    FIO2 40.0 04/06/2020     Lab Results   Component Value Date/Time    SPECIAL LT HAND 6ML 05/23/2020 06:30 PM     Lab Results   Component Value Date/Time    CULTURE NO GROWTH 16 HOURS 05/23/2020 06:30 PM       Radiology:  Fl Ercp Biliary S&i    Result Date: 5/21/2020  Intraprocedural fluoroscopic spot images as above. See separate procedure report for more information. Mri Abdomen Wo Contrast Mrcp    Result Date: 5/18/2020  1. Cholelithiasis. Mildly prominent CBD with a suspected filling defect seen within the distal CBD suggestive of choledocholithiasis. Further evaluation with ERCP is recommended. 2. Hepatic steatosis. 3. Moderate size hiatal hernia.        Physical Examination:        General appearance:  alert, cooperative and no distress  Mental Status:  oriented to person, place and time and normal affect  Lungs:  clear to auscultation bilaterally, normal effort  Heart:  regular rate and rhythm, no murmur  Abdomen:  soft, nontender, nondistended, normal bowel sounds, no masses, hepatomegaly, splenomegaly  Extremities:  no edema, redness, tenderness in the calves  Skin:  no gross lesions, rashes, induration    Assessment:        Hospital Problems           Last Modified POA    * (Principal) Gastric outlet obstruction 5/16/2020 Yes    MVP (mitral valve prolapse) 5/12/2020 Yes    Dysphagia 5/22/2020 Yes    Centrilobular emphysema ([address] 5/12/2020 Yes    Fever 5/14/2020 Yes    Recurrent UTI 5/12/2020 Yes    Overview Signed 5/12/2020  5:22 PM by MD Dr. Joelle Ortiz         Tracheostomy in place [facility]) 5/12/2020 Yes    H/O gastric ulcer 5/12/2020 Yes    Hyperlipidemia 5/12/2020 Yes    Hypertension 5/12/2020 Yes Tobacco abuse 5/12/2020 Yes    Chronic respiratory failure with hypoxia ([facility] 5/12/2020 Yes    Status post insertion of percutaneous endoscopic gastrostomy (PEG) tube ([address] 5/12/2020 Yes    S/P Nissen fundoplication (without gastrostomy tube) procedure 5/12/2020 Yes    Paralytic ileus ([address] 5/17/2020 Yes    Elevated liver enzymes 5/17/2020 Yes          Plan:        1. Gastric outlet obstruction/pylorus paralysis -patient is status post EGD patient is status post pyloroplasty and cholecystectomy. Patient had postop delirium and postop atelectasis with fever. Patient is gradually improving but mentation is still altered. 2. CBD dilation without intrabiliary dilation -status post cholecystectomy. 3. Chronic respiratory failure with hypoxia -patient has chronic tracheostomy tube. Continue oxygen supplement. 4. Urinary retention -patient has Saldana inserted. Flomax added by urology. 5. COPD -stable. Continue home medications. 6. History of bipolar disorder and anxiety -continue current medical management. 7. Essential hypertension and hyperlipidemia -stable. Continue home medications. 8. Diabetes mellitus type 2 -sliding scale insulin. 9. BENEDICT -resolved  10. Pulmonary edema -resolved  11. Fever and delirium -likely secondary to postop complication. Infectious disease work-up ongoing. Continue to monitor.     Serafin Cool DO  5/24/2020  3:45 PM

## 2020-05-25 LAB
ABSOLUTE EOS #: 0.07 K/UL (ref 0–0.44)
ABSOLUTE IMMATURE GRANULOCYTE: 0.07 K/UL (ref 0–0.3)
ABSOLUTE LYMPH #: 0.71 K/UL (ref 1.1–3.7)
ABSOLUTE MONO #: 0.68 K/UL (ref 0.1–1.2)
ANION GAP SERPL CALCULATED.3IONS-SCNC: 11 MMOL/L (ref 9–17)
BASOPHILS # BLD: 0 % (ref 0–2)
BASOPHILS ABSOLUTE: <0.03 K/UL (ref 0–0.2)
BUN BLDV-MCNC: 17 MG/DL (ref 8–23)
BUN/CREAT BLD: ABNORMAL (ref 9–20)
CALCIUM SERPL-MCNC: 9 MG/DL (ref 8.6–10.4)
CHLORIDE BLD-SCNC: 104 MMOL/L (ref 98–107)
CO2: 24 MMOL/L (ref 20–31)
CREAT SERPL-MCNC: 0.65 MG/DL (ref 0.5–0.9)
DIFFERENTIAL TYPE: ABNORMAL
EOSINOPHILS RELATIVE PERCENT: 1 % (ref 1–4)
GFR AFRICAN AMERICAN: >60 ML/MIN
GFR NON-AFRICAN AMERICAN: >60 ML/MIN
GFR SERPL CREATININE-BSD FRML MDRD: ABNORMAL ML/MIN/{1.73_M2}
GFR SERPL CREATININE-BSD FRML MDRD: ABNORMAL ML/MIN/{1.73_M2}
GLUCOSE BLD-MCNC: 115 MG/DL (ref 70–99)
GLUCOSE BLD-MCNC: 122 MG/DL (ref 65–105)
GLUCOSE BLD-MCNC: 123 MG/DL (ref 65–105)
GLUCOSE BLD-MCNC: 94 MG/DL (ref 65–105)
HCT VFR BLD CALC: 31.3 % (ref 36.3–47.1)
HEMOGLOBIN: 9 G/DL (ref 11.9–15.1)
IMMATURE GRANULOCYTES: 1 %
LYMPHOCYTES # BLD: 8 % (ref 24–43)
MAGNESIUM: 1.9 MG/DL (ref 1.6–2.6)
MCH RBC QN AUTO: 26.9 PG (ref 25.2–33.5)
MCHC RBC AUTO-ENTMCNC: 28.8 G/DL (ref 28.4–34.8)
MCV RBC AUTO: 93.4 FL (ref 82.6–102.9)
MONOCYTES # BLD: 8 % (ref 3–12)
NRBC AUTOMATED: 0 PER 100 WBC
PDW BLD-RTO: 18 % (ref 11.8–14.4)
PHOSPHORUS: 1.7 MG/DL (ref 2.6–4.5)
PLATELET # BLD: 234 K/UL (ref 138–453)
PLATELET ESTIMATE: ABNORMAL
PMV BLD AUTO: 11.5 FL (ref 8.1–13.5)
POTASSIUM SERPL-SCNC: 3.8 MMOL/L (ref 3.7–5.3)
RBC # BLD: 3.35 M/UL (ref 3.95–5.11)
RBC # BLD: ABNORMAL 10*6/UL
SEG NEUTROPHILS: 82 % (ref 36–65)
SEGMENTED NEUTROPHILS ABSOLUTE COUNT: 6.88 K/UL (ref 1.5–8.1)
SODIUM BLD-SCNC: 139 MMOL/L (ref 135–144)
WBC # BLD: 8.4 K/UL (ref 3.5–11.3)
WBC # BLD: ABNORMAL 10*3/UL

## 2020-05-25 PROCEDURE — 6370000000 HC RX 637 (ALT 250 FOR IP): Performed by: SURGERY

## 2020-05-25 PROCEDURE — 6360000002 HC RX W HCPCS: Performed by: INTERNAL MEDICINE

## 2020-05-25 PROCEDURE — 6360000002 HC RX W HCPCS: Performed by: SURGERY

## 2020-05-25 PROCEDURE — 82947 ASSAY GLUCOSE BLOOD QUANT: CPT

## 2020-05-25 PROCEDURE — C9113 INJ PANTOPRAZOLE SODIUM, VIA: HCPCS | Performed by: SURGERY

## 2020-05-25 PROCEDURE — 85025 COMPLETE CBC W/AUTO DIFF WBC: CPT

## 2020-05-25 PROCEDURE — 6370000000 HC RX 637 (ALT 250 FOR IP): Performed by: HOSPITALIST

## 2020-05-25 PROCEDURE — 2580000003 HC RX 258: Performed by: SURGERY

## 2020-05-25 PROCEDURE — 94640 AIRWAY INHALATION TREATMENT: CPT

## 2020-05-25 PROCEDURE — 80048 BASIC METABOLIC PNL TOTAL CA: CPT

## 2020-05-25 PROCEDURE — 2500000003 HC RX 250 WO HCPCS: Performed by: FAMILY MEDICINE

## 2020-05-25 PROCEDURE — 2700000000 HC OXYGEN THERAPY PER DAY

## 2020-05-25 PROCEDURE — 83735 ASSAY OF MAGNESIUM: CPT

## 2020-05-25 PROCEDURE — 6370000000 HC RX 637 (ALT 250 FOR IP): Performed by: FAMILY MEDICINE

## 2020-05-25 PROCEDURE — 36415 COLL VENOUS BLD VENIPUNCTURE: CPT

## 2020-05-25 PROCEDURE — 6360000002 HC RX W HCPCS: Performed by: STUDENT IN AN ORGANIZED HEALTH CARE EDUCATION/TRAINING PROGRAM

## 2020-05-25 PROCEDURE — 94761 N-INVAS EAR/PLS OXIMETRY MLT: CPT

## 2020-05-25 PROCEDURE — 99232 SBSQ HOSP IP/OBS MODERATE 35: CPT | Performed by: HOSPITALIST

## 2020-05-25 PROCEDURE — 1200000000 HC SEMI PRIVATE

## 2020-05-25 PROCEDURE — 6370000000 HC RX 637 (ALT 250 FOR IP): Performed by: INTERNAL MEDICINE

## 2020-05-25 PROCEDURE — 84100 ASSAY OF PHOSPHORUS: CPT

## 2020-05-25 PROCEDURE — 99232 SBSQ HOSP IP/OBS MODERATE 35: CPT | Performed by: INTERNAL MEDICINE

## 2020-05-25 PROCEDURE — 2580000003 HC RX 258: Performed by: INTERNAL MEDICINE

## 2020-05-25 RX ORDER — IPRATROPIUM BROMIDE AND ALBUTEROL SULFATE 2.5; .5 MG/3ML; MG/3ML
1 SOLUTION RESPIRATORY (INHALATION) EVERY 4 HOURS PRN
Status: DISCONTINUED | OUTPATIENT
Start: 2020-05-25 | End: 2020-06-02 | Stop reason: HOSPADM

## 2020-05-25 RX ADMIN — PANTOPRAZOLE SODIUM 40 MG: 40 INJECTION, POWDER, LYOPHILIZED, FOR SOLUTION INTRAVENOUS at 21:32

## 2020-05-25 RX ADMIN — CLONAZEPAM 0.5 MG: 0.5 TABLET ORAL at 09:25

## 2020-05-25 RX ADMIN — ACETAMINOPHEN 650 MG: 650 SUPPOSITORY RECTAL at 12:55

## 2020-05-25 RX ADMIN — TRAZODONE HYDROCHLORIDE 100 MG: 100 TABLET ORAL at 21:32

## 2020-05-25 RX ADMIN — MORPHINE SULFATE 2 MG: 2 INJECTION, SOLUTION INTRAMUSCULAR; INTRAVENOUS at 01:17

## 2020-05-25 RX ADMIN — BUSPIRONE HYDROCHLORIDE 20 MG: 10 TABLET ORAL at 09:25

## 2020-05-25 RX ADMIN — MEROPENEM 1 G: 1 INJECTION, POWDER, FOR SOLUTION INTRAVENOUS at 17:32

## 2020-05-25 RX ADMIN — QUETIAPINE FUMARATE 100 MG: 100 TABLET ORAL at 21:12

## 2020-05-25 RX ADMIN — SUCRALFATE 1 G: 1 TABLET ORAL at 00:13

## 2020-05-25 RX ADMIN — ONDANSETRON 4 MG: 2 INJECTION INTRAMUSCULAR; INTRAVENOUS at 22:02

## 2020-05-25 RX ADMIN — MORPHINE SULFATE 2 MG: 2 INJECTION, SOLUTION INTRAMUSCULAR; INTRAVENOUS at 17:37

## 2020-05-25 RX ADMIN — CLONAZEPAM 0.5 MG: 0.5 TABLET ORAL at 21:33

## 2020-05-25 RX ADMIN — ESCITALOPRAM OXALATE 20 MG: 10 TABLET ORAL at 09:24

## 2020-05-25 RX ADMIN — POTASSIUM CHLORIDE: 2 INJECTION, SOLUTION, CONCENTRATE INTRAVENOUS at 17:34

## 2020-05-25 RX ADMIN — METOCLOPRAMIDE 10 MG: 5 INJECTION, SOLUTION INTRAMUSCULAR; INTRAVENOUS at 16:16

## 2020-05-25 RX ADMIN — LORAZEPAM 1 MG: 2 INJECTION INTRAMUSCULAR at 01:17

## 2020-05-25 RX ADMIN — ONDANSETRON 4 MG: 2 INJECTION INTRAMUSCULAR; INTRAVENOUS at 11:22

## 2020-05-25 RX ADMIN — OXYCODONE HYDROCHLORIDE AND ACETAMINOPHEN 1 TABLET: 5; 325 TABLET ORAL at 06:31

## 2020-05-25 RX ADMIN — Medication 10 ML: at 09:33

## 2020-05-25 RX ADMIN — OXYCODONE HYDROCHLORIDE AND ACETAMINOPHEN 1 TABLET: 5; 325 TABLET ORAL at 16:16

## 2020-05-25 RX ADMIN — ENOXAPARIN SODIUM 40 MG: 40 INJECTION SUBCUTANEOUS at 09:33

## 2020-05-25 RX ADMIN — FUROSEMIDE 20 MG: 20 TABLET ORAL at 09:24

## 2020-05-25 RX ADMIN — MEROPENEM 1 G: 1 INJECTION, POWDER, FOR SOLUTION INTRAVENOUS at 09:28

## 2020-05-25 RX ADMIN — OXYCODONE HYDROCHLORIDE AND ACETAMINOPHEN 1 TABLET: 5; 325 TABLET ORAL at 10:46

## 2020-05-25 RX ADMIN — BUSPIRONE HYDROCHLORIDE 20 MG: 10 TABLET ORAL at 21:32

## 2020-05-25 RX ADMIN — IPRATROPIUM BROMIDE AND ALBUTEROL SULFATE 1 AMPULE: .5; 3 SOLUTION RESPIRATORY (INHALATION) at 11:15

## 2020-05-25 RX ADMIN — METOCLOPRAMIDE 10 MG: 5 INJECTION, SOLUTION INTRAMUSCULAR; INTRAVENOUS at 04:26

## 2020-05-25 RX ADMIN — GABAPENTIN 300 MG: 300 CAPSULE ORAL at 21:32

## 2020-05-25 RX ADMIN — BISACODYL 10 MG: 10 SUPPOSITORY RECTAL at 10:46

## 2020-05-25 RX ADMIN — TAMSULOSIN HYDROCHLORIDE 0.4 MG: 0.4 CAPSULE ORAL at 09:24

## 2020-05-25 RX ADMIN — METOCLOPRAMIDE 10 MG: 5 INJECTION, SOLUTION INTRAMUSCULAR; INTRAVENOUS at 09:36

## 2020-05-25 RX ADMIN — LORAZEPAM 1 MG: 2 INJECTION INTRAMUSCULAR at 06:23

## 2020-05-25 RX ADMIN — MEROPENEM 1 G: 1 INJECTION, POWDER, FOR SOLUTION INTRAVENOUS at 01:17

## 2020-05-25 RX ADMIN — SUCRALFATE 1 G: 1 TABLET ORAL at 12:55

## 2020-05-25 RX ADMIN — Medication 10 ML: at 21:33

## 2020-05-25 RX ADMIN — AMITRIPTYLINE HYDROCHLORIDE 25 MG: 25 TABLET, FILM COATED ORAL at 21:32

## 2020-05-25 RX ADMIN — LORAZEPAM 1 MG: 2 INJECTION INTRAMUSCULAR at 16:20

## 2020-05-25 RX ADMIN — SUCRALFATE 1 G: 1 TABLET ORAL at 17:32

## 2020-05-25 RX ADMIN — SUCRALFATE 1 G: 1 TABLET ORAL at 06:21

## 2020-05-25 RX ADMIN — AMITRIPTYLINE HYDROCHLORIDE 25 MG: 25 TABLET, FILM COATED ORAL at 09:24

## 2020-05-25 RX ADMIN — METOCLOPRAMIDE 10 MG: 5 INJECTION, SOLUTION INTRAMUSCULAR; INTRAVENOUS at 21:32

## 2020-05-25 RX ADMIN — PANTOPRAZOLE SODIUM 40 MG: 40 INJECTION, POWDER, LYOPHILIZED, FOR SOLUTION INTRAVENOUS at 09:33

## 2020-05-25 RX ADMIN — GABAPENTIN 300 MG: 300 CAPSULE ORAL at 09:24

## 2020-05-25 ASSESSMENT — PAIN DESCRIPTION - PAIN TYPE: TYPE: SURGICAL PAIN

## 2020-05-25 ASSESSMENT — PAIN DESCRIPTION - PROGRESSION: CLINICAL_PROGRESSION: NOT CHANGED

## 2020-05-25 ASSESSMENT — PAIN SCALES - GENERAL
PAINLEVEL_OUTOF10: 7
PAINLEVEL_OUTOF10: 7
PAINLEVEL_OUTOF10: 6
PAINLEVEL_OUTOF10: 6
PAINLEVEL_OUTOF10: 9
PAINLEVEL_OUTOF10: 6
PAINLEVEL_OUTOF10: 7

## 2020-05-25 ASSESSMENT — PAIN DESCRIPTION - ONSET: ONSET: ON-GOING

## 2020-05-25 ASSESSMENT — PAIN - FUNCTIONAL ASSESSMENT: PAIN_FUNCTIONAL_ASSESSMENT: PREVENTS OR INTERFERES SOME ACTIVE ACTIVITIES AND ADLS

## 2020-05-25 ASSESSMENT — PAIN DESCRIPTION - DESCRIPTORS: DESCRIPTORS: SHARP

## 2020-05-25 ASSESSMENT — PAIN DESCRIPTION - FREQUENCY: FREQUENCY: CONTINUOUS

## 2020-05-25 ASSESSMENT — PAIN DESCRIPTION - LOCATION: LOCATION: ABDOMEN;RIB CAGE

## 2020-05-25 ASSESSMENT — PAIN SCALES - WONG BAKER: WONGBAKER_NUMERICALRESPONSE: 0

## 2020-05-25 NOTE — PROGRESS NOTES
Surgery Progress Note            PATIENT NAME: Tari Horn     TODAY'S DATE: 5/25/2020, 6:35 AM    SUBJECTIVE:    Pt seen and examined. No acute events overnight. Tolerating sips of water w/out n/v. Febrile X1 Tm 38.4C. +BM/flatus     OBJECTIVE:   VITALS:  /61   Pulse 95   Temp 99.8 °F (37.7 °C) (Oral)   Resp 24   Ht 5' 3\" (1.6 m)   Wt 181 lb 2 oz (82.2 kg)   SpO2 94%   BMI 32.08 kg/m²      INTAKE/OUTPUT:      Intake/Output Summary (Last 24 hours) at 5/25/2020 8823  Last data filed at 5/24/2020 1751  Gross per 24 hour   Intake 2158 ml   Output 950 ml   Net 1208 ml       PHYSICAL EXAM  General Appearance: in no acute distress  Skin:  Warm and dry   Head/face:  NCAT and sclera anicteric   Lungs:  Respirations even and unlabored   Heart: s1+s2  Abdomen:  Soft, mild distention, attp. No rebound or guarding. Incisions c/d/io w/ skin glue. G tube intact and site clean and dry.    Extremities: no edema and no cyanosis          Data:  CBC with Differential:    Lab Results   Component Value Date    WBC 8.4 05/25/2020    RBC 3.35 05/25/2020    HGB 9.0 05/25/2020    HCT 31.3 05/25/2020     05/25/2020    MCV 93.4 05/25/2020    MCH 26.9 05/25/2020    MCHC 28.8 05/25/2020    RDW 18.0 05/25/2020    NRBC 1 05/12/2020    LYMPHOPCT 8 05/25/2020    MONOPCT 8 05/25/2020    BASOPCT 0 05/25/2020    MONOSABS 0.68 05/25/2020    LYMPHSABS 0.71 05/25/2020    EOSABS 0.07 05/25/2020    BASOSABS <0.03 05/25/2020    DIFFTYPE NOT REPORTED 05/25/2020     BMP:    Lab Results   Component Value Date     05/25/2020    K 3.8 05/25/2020     05/25/2020    CO2 24 05/25/2020    BUN 17 05/25/2020    LABALBU 2.8 05/23/2020    CREATININE 0.65 05/25/2020    CALCIUM 9.0 05/25/2020    GFRAA >60 05/25/2020    LABGLOM >60 05/25/2020    GLUCOSE 115 05/25/2020       Radiology Review:    EXAMINATION:   RIGHT UPPER QUADRANT ULTRASOUND       5/16/2020 12:45 pm       COMPARISON:   None.       HISTORY:   ORDERING SYSTEM PROVIDED HISTORY:

## 2020-05-25 NOTE — PROGRESS NOTES
[facility] - Progress Note    Today's Date and Time: 5/25/2020, 10:59 AM    Impression :   1. Acute respiratory failure status post intubation 3/17, trach placement 3/27, Replacement in OR 4/2/2020  2. Status post PEG tube placement 3-27-20  3. Peptic ulcer disease  4. Status post large hiatal hernia repair and Nissen semi-fundoplication 8/86/6867  5. Pulmonary edema- Resolved  6. Pleural effusions  7. Acute kidney injury- Resolved  8. Persistent Nausea and intermittent vomiting  9. Abdominal pain  10. Reactive Leukocytosis  11. Severe esophagitis, nodular gastritis, pyloric stenosis by EGD on 5-18-20. S/P pyloric balloon dilatation. 12. Allergy to penicillins- Severe  13- S/P cholecystectomy and pyloroplasty 5-22-20    Recommendations:   · Meropenem 1 gm IV q 8 hr .Start 5-22-20. Stop date 5-29-20. · Prior Antibiotic modifications:  · Completed IV meropenem stop date 4/15//2020  · Completed Diflucan 200 mg po x 5 days. Stop date 5-10-20  ·  Supportive care    Medical Decision Making/Summary/Discussion:5/25/2020     · Patient with severe GERD   · She underwent laparoscopic robotic hiatal hernia repair and fundoplication on 46/90/5091  · She presented [facility] on 2[facility]29 with worsening shortness of breath, dyspnea as well as an episode of dark red emesis. · CTA chest was negative for PE but it showed bilateral pleural effusions and severe esophageal dilatation. · Surgery was consulted. Patient was transferred to [facility] for further management. · Patient was intubated and admitted to the ICU. She improved, was extubated on 3-7 to high flow O2. Pt was transferred out of the ICU on 3-13. · On 3-14 pts hgb dropped to 6.3 and she developed coffee-ground, then bright red emesis, and was again transferred back to medical ICU. · General surgery performed endoscopy on 3/17 which showed an ulcer at the GE junction and mild gastritis with no active bleeding.    · After the SVC.     IMPRESSION:     Mild cardiomegaly with mild prominence of the pulmonary interstitium.        Electronically Signed By: Rene Walsh D.O. MOliverioSOliverio 04/06/2020 15:25:51 EDT     XR ABDOMEN KUB SUPINE:     Portable KUB.     Contrast injected through the feeding tube directly enters the mid stomach.  Bowel pattern is normal.  No incidental findings.     IMPRESSION:     Peg tube in the stomach.        Electronically Signed By: Dr. Paola Pleitez M.D. 04/06/2020 15:26:25 EDT        CT Abdomen and Pelvis w/o contrast: 4-13-20     Axial acquisition through the abdomen and pelvis without contrast.     Bilateral basilar pulmonary infiltrates are present left greater than right.     A few tiny gallstones are seen layering in the gallbladder, which is mildly distended.  There is no gallbladder wall thickening or pericholecystic fluid, edema.     No liver lesion or bile duct dilatation.     Spleen pancreas left kidney unremarkable.  Absent right kidney.     No intestinal obstruction.  No free air.  No ascites.  No abscess identified.     Urinary bladder is empty, Saldana catheter in place.  Uterus and adnexa not visualized.  Gastrostomy tube tip is in the body of the stomach.     IMPRESSION:     1. No intra-abdominal abscess.  No free air no ascites.  No obstruction. 2. Bilateral basilar pulmonary infiltrates. 3. A few tiny gallstones seen within the gallbladder.  Mild gallbladder distention.        Electronically Signed By: Dr. Matt Mack M.D. 04/13/2020 12:10:34 EDT     XR ABDOMEN KUB SUPINE:4-13-20     Clinical statement: constipation.     Comparison: 4/6/2020.     Findings: A percutaneous gastrostomy tube appears in satisfactory position overlying the stomach.  A nonobstructive bowel gas pattern is noted. There is normal amount of stool appreciated within the colon. Luster Schlichter is no free air. There are no abnormal   masses or calcifications.  The osseous structures and soft tissues are unremarkable.     IMPRESSION:     No acute finding. PEG tube is in place.        Electronically Signed By: Dr. Lavinia Marinelli M.D. 04/13/2020 9:45:23 EDT    Medical Decision [id number]:       Medical Decision Making-Other:     Note:  · Labs, medications, radiologic studies were reviewed with personal review of films  · Large amounts of data were reviewed  · Discussed with nursing Staff, Discharge planner  · Infection Control and Prevention measures reviewed  · All prior entries were reviewed  · Administer medications as ordered  · Prognosis: Guarded  · Discharge planning reviewed  · Follow up as outpatient. Thank you for allowing us to participate in the care of this patient. Please call with questions.     [address], [place]  Pager: [phone] - Office: [phone]

## 2020-05-25 NOTE — PLAN OF CARE
Problem: Pain:  Description: Pain management should include both nonpharmacologic and pharmacologic interventions.   Goal: Pain level will decrease  Description: Pain level will decrease  5/25/2020 0445 by Keyana Castro RN  Outcome: Ongoing  5/24/2020 1805 by Vannesa Crandall RN  Outcome: Ongoing  Goal: Control of acute pain  Description: Control of acute pain  5/25/2020 0445 by Keyana Castro RN  Outcome: Ongoing  5/24/2020 1805 by Vannesa Crandall RN  Outcome: Ongoing  Goal: Control of chronic pain  Description: Control of chronic pain  5/25/2020 0445 by Keyana Castro RN  Outcome: Ongoing  5/24/2020 1805 by Vannesa Crandall RN  Outcome: Ongoing     Problem: Falls - Risk of:  Goal: Will remain free from falls  Description: Will remain free from falls  5/25/2020 0445 by Keyana Castro RN  Outcome: Ongoing  5/24/2020 1805 by Vannesa Crandall RN  Outcome: Ongoing  Goal: Absence of physical injury  Description: Absence of physical injury  5/25/2020 0445 by Keyana Castro RN  Outcome: Ongoing  5/24/2020 1805 by Vannesa Crandall RN  Outcome: Ongoing     Problem: Nutrition  Goal: Optimal nutrition therapy  5/25/2020 0445 by Keyana Castro RN  Outcome: Ongoing  5/24/2020 1805 by Vannesa Crandall RN  Outcome: Ongoing     Problem: Musculor/Skeletal Functional Status  Goal: Highest potential functional level  5/25/2020 0445 by Keyana Castro RN  Outcome: Ongoing  5/24/2020 1805 by Vannesa Crandall RN  Outcome: Ongoing     Problem: Infection:  Goal: Will remain free from infection  Description: Will remain free from infection  5/25/2020 0445 by Keyana Castro RN  Outcome: Ongoing  5/24/2020 1805 by Vannesa Crandall RN  Outcome: Ongoing     Problem: Safety:  Goal: Free from accidental physical injury  Description: Free from accidental physical injury  5/25/2020 0445 by Keyana Castro RN  Outcome: Ongoing  5/24/2020 1805 by Vannesa Crandall RN  Outcome: Ongoing     Problem: Daily Care:  Goal: Daily care needs are met  Description: Daily care needs are

## 2020-05-25 NOTE — PROGRESS NOTES
hospital for the management of SBO  Patient transferred to our facility from [facility] AT [facility] with possibility of bowel obstruction, 2 months ago she had prolonged extensive hospital stay, at the end of February had a Nissen fundoplication with possible aspiration issues and was admitted and stayed in the hospital for 37 days during that time she was intubated twice had hematemesis and had EGD that showed gastric ulcer and was seen by a specialist and after long course she was successfully transitioned to a trach and PEG and discharged to [place] [address] on 4/6. This time the patient has been having increasing abdominal pain nausea and vomiting over that has been worsening since she was discharged over at [facility]  Patient had ongoing issues since then, had SBO that resolved , since then she has an issue  with tolerating TF,  a PICC line placed on 4/25 at [facility] and was startd TPN , given she had persistent N/V, TF held and  PEG tube has been down to gravity for the last couple of days. She also has been treated for UTI and finishing a course of Zyvox ordered by ID.     Anxiety continues overnight  VSS- modified barium   COVID swab  esogram possible if warranted by VSS; and possible peg tube eval  Fevers overnight prompting blood cultures x2 urinalysis with culture all pending at current time  Passed swallow study  UGI series show gastric outlet obstruction,GI consult added by surgery    Review of Systems:     Constitutional:  negative for chills, fevers, sweats  Respiratory:  negative for cough, dyspnea on exertion, shortness of breath, wheezing  Cardiovascular:  negative for chest pain, chest pressure/discomfort, lower extremity edema, palpitations  Gastrointestinal:  negative for abdominal pain, constipation, diarrhea, nausea, vomiting  Neurological:  negative for dizziness, headache    Medications: Allergies:     Allergies   Allergen Reactions    Prednisone Anxiety    Compazine [Prochlorperazine Maleate]     Penicillin V Potassium     Penicillins Other (See Comments)     shock       Current Meds:   Scheduled Meds:    meropenem  1 g Intravenous Q8H    sodium chloride flush  10 mL Intravenous 2 times per day    sodium chloride flush  10 mL Intravenous 2 times per day    enoxaparin  40 mg Subcutaneous Daily    pantoprazole  40 mg Intravenous BID    And    sodium chloride (PF)  10 mL Intravenous BID    sucralfate  1 g Oral 4 times per day    metoprolol succinate  25 mg Oral Daily    furosemide  20 mg Oral Daily    bisacodyl  10 mg Rectal Daily    clonazePAM  0.5 mg Oral BID    QUEtiapine  100 mg Oral Nightly    traZODone  100 mg Oral Nightly    tamsulosin  0.4 mg Oral Daily    metoclopramide  10 mg Intravenous Q6H    sodium chloride flush  10 mL Intravenous 2 times per day    amitriptyline  25 mg Oral TID    busPIRone  20 mg Oral TID    escitalopram  20 mg Oral Daily    gabapentin  300 mg Oral TID     Continuous Infusions:    PN-Adult 2-in-1 Central Line (Standard) 60 mL/hr at 05/24/20 1749    dextrose 5 % and 0.45 % NaCl 20 mL/hr at 05/22/20 0029     PRN Meds: ipratropium-albuterol, acetaminophen, morphine **OR** [DISCONTINUED] morphine, sodium chloride flush, fentanNYL, midazolam, sodium chloride flush, ondansetron, fentanNYL, fentanNYL, fentanNYL, fentanNYL, oxyCODONE-acetaminophen, labetalol, hydrALAZINE, LORazepam, acetaminophen, sodium chloride flush, potassium chloride **OR** potassium alternative oral replacement **OR** potassium chloride, magnesium sulfate, nicotine    Data:     Past Medical History:   has a past medical history of Anxiety, Arthritis, Back pain, Bronchitis, Caffeine use, Carpal tunnel syndrome, Cataracts, bilateral, Constipation, Depression, Diarrhea, GERD (gastroesophageal reflux disease), H/O gastric ulcer, Hyperlipidemia, Hypertension, Mumps, MVP (mitral valve prolapse), Recurrent UTI, Sinusitis, Tracheostomy in place [facility]), Ulcerative esophagitis, and Wellness examination.     Social

## 2020-05-26 ENCOUNTER — APPOINTMENT (OUTPATIENT)
Dept: GENERAL RADIOLOGY | Age: 75
DRG: 326 | End: 2020-05-26
Attending: FAMILY MEDICINE
Payer: MEDICARE

## 2020-05-26 LAB
ABSOLUTE EOS #: 0.08 K/UL (ref 0–0.44)
ABSOLUTE IMMATURE GRANULOCYTE: 0.05 K/UL (ref 0–0.3)
ABSOLUTE LYMPH #: 0.84 K/UL (ref 1.1–3.7)
ABSOLUTE MONO #: 0.68 K/UL (ref 0.1–1.2)
ANION GAP SERPL CALCULATED.3IONS-SCNC: 13 MMOL/L (ref 9–17)
BASOPHILS # BLD: 0 % (ref 0–2)
BASOPHILS ABSOLUTE: <0.03 K/UL (ref 0–0.2)
BUN BLDV-MCNC: 16 MG/DL (ref 8–23)
BUN/CREAT BLD: ABNORMAL (ref 9–20)
CALCIUM SERPL-MCNC: 8.6 MG/DL (ref 8.6–10.4)
CHLORIDE BLD-SCNC: 100 MMOL/L (ref 98–107)
CO2: 23 MMOL/L (ref 20–31)
CREAT SERPL-MCNC: 0.63 MG/DL (ref 0.5–0.9)
DIFFERENTIAL TYPE: ABNORMAL
EOSINOPHILS RELATIVE PERCENT: 1 % (ref 1–4)
GFR AFRICAN AMERICAN: >60 ML/MIN
GFR NON-AFRICAN AMERICAN: >60 ML/MIN
GFR SERPL CREATININE-BSD FRML MDRD: ABNORMAL ML/MIN/{1.73_M2}
GFR SERPL CREATININE-BSD FRML MDRD: ABNORMAL ML/MIN/{1.73_M2}
GLUCOSE BLD-MCNC: 104 MG/DL (ref 65–105)
GLUCOSE BLD-MCNC: 105 MG/DL (ref 65–105)
GLUCOSE BLD-MCNC: 121 MG/DL (ref 70–99)
HCT VFR BLD CALC: 30.4 % (ref 36.3–47.1)
HEMOGLOBIN: 8.7 G/DL (ref 11.9–15.1)
IMMATURE GRANULOCYTES: 1 %
LYMPHOCYTES # BLD: 14 % (ref 24–43)
MCH RBC QN AUTO: 26.9 PG (ref 25.2–33.5)
MCHC RBC AUTO-ENTMCNC: 28.6 G/DL (ref 28.4–34.8)
MCV RBC AUTO: 93.8 FL (ref 82.6–102.9)
MONOCYTES # BLD: 11 % (ref 3–12)
NRBC AUTOMATED: 0 PER 100 WBC
PDW BLD-RTO: 17.8 % (ref 11.8–14.4)
PLATELET # BLD: 238 K/UL (ref 138–453)
PLATELET ESTIMATE: ABNORMAL
PMV BLD AUTO: 11.6 FL (ref 8.1–13.5)
POTASSIUM SERPL-SCNC: 4.2 MMOL/L (ref 3.7–5.3)
RBC # BLD: 3.24 M/UL (ref 3.95–5.11)
RBC # BLD: ABNORMAL 10*6/UL
SEG NEUTROPHILS: 73 % (ref 36–65)
SEGMENTED NEUTROPHILS ABSOLUTE COUNT: 4.49 K/UL (ref 1.5–8.1)
SODIUM BLD-SCNC: 136 MMOL/L (ref 135–144)
SURGICAL PATHOLOGY REPORT: NORMAL
TRIGL SERPL-MCNC: 200 MG/DL
WBC # BLD: 6.2 K/UL (ref 3.5–11.3)
WBC # BLD: ABNORMAL 10*3/UL

## 2020-05-26 PROCEDURE — 81001 URINALYSIS AUTO W/SCOPE: CPT

## 2020-05-26 PROCEDURE — 6370000000 HC RX 637 (ALT 250 FOR IP): Performed by: SURGERY

## 2020-05-26 PROCEDURE — 74240 X-RAY XM UPR GI TRC 1CNTRST: CPT

## 2020-05-26 PROCEDURE — 2580000003 HC RX 258: Performed by: SURGERY

## 2020-05-26 PROCEDURE — 6360000002 HC RX W HCPCS: Performed by: SURGERY

## 2020-05-26 PROCEDURE — 99232 SBSQ HOSP IP/OBS MODERATE 35: CPT | Performed by: INTERNAL MEDICINE

## 2020-05-26 PROCEDURE — 85025 COMPLETE CBC W/AUTO DIFF WBC: CPT

## 2020-05-26 PROCEDURE — 6360000004 HC RX CONTRAST MEDICATION: Performed by: FAMILY MEDICINE

## 2020-05-26 PROCEDURE — 87186 SC STD MICRODIL/AGAR DIL: CPT

## 2020-05-26 PROCEDURE — 2580000003 HC RX 258: Performed by: ANESTHESIOLOGY

## 2020-05-26 PROCEDURE — 2700000000 HC OXYGEN THERAPY PER DAY

## 2020-05-26 PROCEDURE — 94760 N-INVAS EAR/PLS OXIMETRY 1: CPT

## 2020-05-26 PROCEDURE — 2500000003 HC RX 250 WO HCPCS: Performed by: FAMILY MEDICINE

## 2020-05-26 PROCEDURE — 84478 ASSAY OF TRIGLYCERIDES: CPT

## 2020-05-26 PROCEDURE — 6360000002 HC RX W HCPCS: Performed by: INTERNAL MEDICINE

## 2020-05-26 PROCEDURE — 6370000000 HC RX 637 (ALT 250 FOR IP): Performed by: FAMILY MEDICINE

## 2020-05-26 PROCEDURE — 82947 ASSAY GLUCOSE BLOOD QUANT: CPT

## 2020-05-26 PROCEDURE — 71045 X-RAY EXAM CHEST 1 VIEW: CPT

## 2020-05-26 PROCEDURE — 99232 SBSQ HOSP IP/OBS MODERATE 35: CPT | Performed by: FAMILY MEDICINE

## 2020-05-26 PROCEDURE — 2580000003 HC RX 258: Performed by: INTERNAL MEDICINE

## 2020-05-26 PROCEDURE — 97110 THERAPEUTIC EXERCISES: CPT

## 2020-05-26 PROCEDURE — C9113 INJ PANTOPRAZOLE SODIUM, VIA: HCPCS | Performed by: SURGERY

## 2020-05-26 PROCEDURE — 97535 SELF CARE MNGMENT TRAINING: CPT

## 2020-05-26 PROCEDURE — 80048 BASIC METABOLIC PNL TOTAL CA: CPT

## 2020-05-26 PROCEDURE — 1200000000 HC SEMI PRIVATE

## 2020-05-26 PROCEDURE — 87077 CULTURE AEROBIC IDENTIFY: CPT

## 2020-05-26 PROCEDURE — 87086 URINE CULTURE/COLONY COUNT: CPT

## 2020-05-26 PROCEDURE — 6360000002 HC RX W HCPCS: Performed by: STUDENT IN AN ORGANIZED HEALTH CARE EDUCATION/TRAINING PROGRAM

## 2020-05-26 RX ADMIN — METOCLOPRAMIDE 10 MG: 5 INJECTION, SOLUTION INTRAMUSCULAR; INTRAVENOUS at 17:07

## 2020-05-26 RX ADMIN — SUCRALFATE 1 G: 1 TABLET ORAL at 00:59

## 2020-05-26 RX ADMIN — SUCRALFATE 1 G: 1 TABLET ORAL at 23:37

## 2020-05-26 RX ADMIN — SODIUM CHLORIDE, PRESERVATIVE FREE 10 ML: 5 INJECTION INTRAVENOUS at 08:38

## 2020-05-26 RX ADMIN — SUCRALFATE 1 G: 1 TABLET ORAL at 18:09

## 2020-05-26 RX ADMIN — BISACODYL 10 MG: 10 SUPPOSITORY RECTAL at 08:21

## 2020-05-26 RX ADMIN — GABAPENTIN 300 MG: 300 CAPSULE ORAL at 08:21

## 2020-05-26 RX ADMIN — TRAZODONE HYDROCHLORIDE 100 MG: 100 TABLET ORAL at 22:26

## 2020-05-26 RX ADMIN — METOCLOPRAMIDE 10 MG: 5 INJECTION, SOLUTION INTRAMUSCULAR; INTRAVENOUS at 22:25

## 2020-05-26 RX ADMIN — PANTOPRAZOLE SODIUM 40 MG: 40 INJECTION, POWDER, LYOPHILIZED, FOR SOLUTION INTRAVENOUS at 23:36

## 2020-05-26 RX ADMIN — CLONAZEPAM 0.5 MG: 0.5 TABLET ORAL at 22:26

## 2020-05-26 RX ADMIN — Medication 10 ML: at 08:22

## 2020-05-26 RX ADMIN — SODIUM CHLORIDE, PRESERVATIVE FREE 10 ML: 5 INJECTION INTRAVENOUS at 08:37

## 2020-05-26 RX ADMIN — MORPHINE SULFATE 2 MG: 2 INJECTION, SOLUTION INTRAMUSCULAR; INTRAVENOUS at 08:43

## 2020-05-26 RX ADMIN — ESCITALOPRAM OXALATE 20 MG: 10 TABLET ORAL at 08:21

## 2020-05-26 RX ADMIN — SODIUM CHLORIDE, PRESERVATIVE FREE 10 ML: 5 INJECTION INTRAVENOUS at 22:26

## 2020-05-26 RX ADMIN — QUETIAPINE FUMARATE 100 MG: 100 TABLET ORAL at 22:26

## 2020-05-26 RX ADMIN — LORAZEPAM 1 MG: 2 INJECTION INTRAMUSCULAR at 03:20

## 2020-05-26 RX ADMIN — FUROSEMIDE 20 MG: 20 TABLET ORAL at 08:21

## 2020-05-26 RX ADMIN — SUCRALFATE 1 G: 1 TABLET ORAL at 05:23

## 2020-05-26 RX ADMIN — MORPHINE SULFATE 2 MG: 2 INJECTION, SOLUTION INTRAMUSCULAR; INTRAVENOUS at 13:33

## 2020-05-26 RX ADMIN — METOCLOPRAMIDE 10 MG: 5 INJECTION, SOLUTION INTRAMUSCULAR; INTRAVENOUS at 03:20

## 2020-05-26 RX ADMIN — METOCLOPRAMIDE 10 MG: 5 INJECTION, SOLUTION INTRAMUSCULAR; INTRAVENOUS at 11:13

## 2020-05-26 RX ADMIN — ONDANSETRON 4 MG: 2 INJECTION INTRAMUSCULAR; INTRAVENOUS at 03:20

## 2020-05-26 RX ADMIN — AMITRIPTYLINE HYDROCHLORIDE 25 MG: 25 TABLET, FILM COATED ORAL at 08:21

## 2020-05-26 RX ADMIN — ACETAMINOPHEN 650 MG: 325 TABLET ORAL at 05:23

## 2020-05-26 RX ADMIN — MEROPENEM 1 G: 1 INJECTION, POWDER, FOR SOLUTION INTRAVENOUS at 11:13

## 2020-05-26 RX ADMIN — GABAPENTIN 300 MG: 300 CAPSULE ORAL at 22:26

## 2020-05-26 RX ADMIN — LORAZEPAM 1 MG: 2 INJECTION INTRAMUSCULAR at 11:20

## 2020-05-26 RX ADMIN — PANTOPRAZOLE SODIUM 40 MG: 40 INJECTION, POWDER, LYOPHILIZED, FOR SOLUTION INTRAVENOUS at 08:22

## 2020-05-26 RX ADMIN — GABAPENTIN 300 MG: 300 CAPSULE ORAL at 13:30

## 2020-05-26 RX ADMIN — LORAZEPAM 1 MG: 2 INJECTION INTRAMUSCULAR at 15:27

## 2020-05-26 RX ADMIN — Medication 10 ML: at 23:36

## 2020-05-26 RX ADMIN — AMITRIPTYLINE HYDROCHLORIDE 25 MG: 25 TABLET, FILM COATED ORAL at 22:26

## 2020-05-26 RX ADMIN — TAMSULOSIN HYDROCHLORIDE 0.4 MG: 0.4 CAPSULE ORAL at 08:21

## 2020-05-26 RX ADMIN — MEROPENEM 1 G: 1 INJECTION, POWDER, FOR SOLUTION INTRAVENOUS at 18:09

## 2020-05-26 RX ADMIN — MORPHINE SULFATE 2 MG: 2 INJECTION, SOLUTION INTRAMUSCULAR; INTRAVENOUS at 17:07

## 2020-05-26 RX ADMIN — SUCRALFATE 1 G: 1 TABLET ORAL at 13:30

## 2020-05-26 RX ADMIN — MEROPENEM 1 G: 1 INJECTION, POWDER, FOR SOLUTION INTRAVENOUS at 02:33

## 2020-05-26 RX ADMIN — CLONAZEPAM 0.5 MG: 0.5 TABLET ORAL at 08:22

## 2020-05-26 RX ADMIN — IOHEXOL 200 ML: 240 INJECTION, SOLUTION INTRATHECAL; INTRAVASCULAR; INTRAVENOUS; ORAL at 10:15

## 2020-05-26 RX ADMIN — BUSPIRONE HYDROCHLORIDE 20 MG: 10 TABLET ORAL at 13:30

## 2020-05-26 RX ADMIN — ENOXAPARIN SODIUM 40 MG: 40 INJECTION SUBCUTANEOUS at 08:22

## 2020-05-26 RX ADMIN — BUSPIRONE HYDROCHLORIDE 20 MG: 10 TABLET ORAL at 08:21

## 2020-05-26 RX ADMIN — BUSPIRONE HYDROCHLORIDE 20 MG: 10 TABLET ORAL at 22:26

## 2020-05-26 RX ADMIN — ACETAMINOPHEN 650 MG: 325 TABLET ORAL at 17:00

## 2020-05-26 RX ADMIN — POTASSIUM CHLORIDE: 2 INJECTION, SOLUTION, CONCENTRATE INTRAVENOUS at 18:09

## 2020-05-26 RX ADMIN — AMITRIPTYLINE HYDROCHLORIDE 25 MG: 25 TABLET, FILM COATED ORAL at 13:31

## 2020-05-26 ASSESSMENT — PAIN SCALES - GENERAL
PAINLEVEL_OUTOF10: 6
PAINLEVEL_OUTOF10: 9
PAINLEVEL_OUTOF10: 8
PAINLEVEL_OUTOF10: 8
PAINLEVEL_OUTOF10: 0
PAINLEVEL_OUTOF10: 8
PAINLEVEL_OUTOF10: 8

## 2020-05-26 ASSESSMENT — PAIN DESCRIPTION - PAIN TYPE: TYPE: SURGICAL PAIN

## 2020-05-26 ASSESSMENT — PAIN DESCRIPTION - LOCATION: LOCATION: ABDOMEN;GENERALIZED;BACK

## 2020-05-26 ASSESSMENT — PAIN DESCRIPTION - FREQUENCY: FREQUENCY: CONTINUOUS

## 2020-05-26 NOTE — PROGRESS NOTES
5/18/2020); Upper gastrointestinal endoscopy (5/18/2020); ERCP (05/21/2020); Cholecystectomy, laparoscopic (05/22/2020); ERCP (5/21/2020); ERCP (5/21/2020); ERCP (5/21/2020); and Cholecystectomy, laparoscopic (N/A, 5/22/2020). Restrictions  Restrictions/Precautions  Restrictions/Precautions: General Precautions, Fall Risk, Up as Tolerated  Required Braces or Orthoses?: No  Position Activity Restriction  Other position/activity restrictions: Up with assistance   Subjective   General  Patient assessed for rehabilitation services?: Yes  Family / Caregiver Present: No  Pain Assessment  Pain Level: 9  Pain Type: Surgical pain  Pain Location: Abdomen;Generalized;Back  Pain Frequency: Continuous  Response to Pain Intervention: None(RN arrived to provide pain meds)  Vital Signs  Patient Currently in Pain: Yes   Orientation  Orientation  Overall Orientation Status: Within Functional Limits  Objective    ADL  Feeding: NPO  Grooming: Setup; Increased time to complete;Verbal cueing; Moderate assistance;Maximum assistance  UE Bathing: Setup;Verbal cueing; Increased time to complete;Maximum assistance  UE Dressing: Setup;Verbal cueing; Increased time to complete;Maximum assistance      Bed mobility  Rolling to Left: Moderate assistance;Maximum assistance  Rolling to Right: Moderate assistance;Maximum assistance  Comment: pt in too much pain and declined EOB     Attendance  Participation: Active participation      Pt slow to process information and required verbal/tactile cues to initiate/sequence and stay on task- good return      Plan   Plan  Times per week: 3-5x/wk   Current Treatment Recommendations: Functional Mobility Training, Endurance Training, Safety Education & Training, Patient/Caregiver Education & Training, Equipment Evaluation, Education, & procurement, Self-Care / ADL    Goals  Short term goals  Time Frame for Short term goals: by discharge, pt will  Short term goal 1: demo I in UE ADL activities with set up  Short

## 2020-05-26 NOTE — PROGRESS NOTES
Nutrition Assessment (Parenteral Nutrition)    Type and Reason for Visit: Reassess    Nutrition Recommendations:   -Continue CL diet, advance as tolerated   -Continue ensure clear supplements TID   -Continue PN 2-in-1 custom central @ 60 mL/hr x 24 hrs (1440 mLs) + 325 gms dextrose + 75 gms AA ~> 1405 kcals, 75 gms protein -- With D5% @ 20 mL/hr ~> 1487 kcals/d  -If trickle tube feeding is desired, recommend Osmolite 1.2 (Standard w/out fiber) @ 10 mL/hr x 24 hrs ~> 288 kcals, 13 gms protein  -Will continue to hold IV lipids until triglycerides are WNL   -Recommend increasing NaCl and adding Ca in next bag  -Will continue monitor TPN, po intake and labs     Nutrition Assessment: Pt continues to have a poor appetite and is consuming minimal amounts of her meals/supplements. TPN continues. + BM. If trickle tube feeding is desired, please see rec's above. Triglyerdies are now 200 mg/dL - will continue to hold IV lipids until WNL. Will recheck lipids in a few days. Will continue to monitor. Malnutrition Assessment:  · Malnutrition Status: At risk for malnutrition  · Context: Acute illness or injury  · Findings of the 6 clinical characteristics of malnutrition (Minimum of 2 out of 6 clinical characteristics is required to make the diagnosis of moderate or severe Protein Calorie Malnutrition based on AND/ASPEN Guidelines):  1. Energy Intake-Greater than 75% of estimated energy requirement, Greater than or equal to 1 month(w/ TPN)    2. Weight Loss-No significant weight loss,    3. Fat Loss-No significant subcutaneous fat loss,    4. Muscle Loss-No significant muscle mass loss,    5. Fluid Accumulation-Mild fluid accumulation, Extremities  6.   Strength-Not measured    Nutrition Risk Level: High    Nutrient Needs:  · Estimated Daily Total Kcal: 1.2-1.3 ~> 4794-1142 kcals/d   · Estimated Daily Protein (g): 1.2-1.4 gm/kg ~> 60-75 gms/d     Nutrition Diagnosis:   · Problem: Inadequate oral intake  · Etiology:

## 2020-05-26 NOTE — PROGRESS NOTES
cooperative,looks anxious   Trach noted  Mental Status:  oriented to person, place and time and normal affect  Lungs:  diminished bilaterally, normal effort  Heart:  regular rate and rhythm, no murmur  Abdomen: Peg tube noted,  soft, nontender, nondistended, normal bowel sounds, no masses, hepatomegaly, splenomegaly  Extremities:  no edema, redness, tenderness in the calves  Skin:  no gross lesions, rashes, induration    Assessment:        Hospital Problems           Last Modified POA    * (Principal) Gastric outlet obstruction 5/16/2020 Yes    MVP (mitral valve prolapse) 5/12/2020 Yes    Dysphagia 5/22/2020 Yes    Centrilobular emphysema ([address] 5/12/2020 Yes    Fever 5/14/2020 Yes    Recurrent UTI 5/12/2020 Yes    Overview Signed 5/12/2020  5:22 PM by MD Dr. Farheen Kaplan         Tracheostomy in place [facility]) 5/12/2020 Yes    H/O gastric ulcer 5/12/2020 Yes    Hyperlipidemia 5/12/2020 Yes    Hypertension 5/12/2020 Yes    Tobacco abuse 5/12/2020 Yes    Chronic respiratory failure with hypoxia ([address] 5/12/2020 Yes    Status post insertion of percutaneous endoscopic gastrostomy (PEG) tube ([address] 5/12/2020 Yes    S/P Nissen fundoplication (without gastrostomy tube) procedure 5/12/2020 Yes    Paralytic ileus ([address] 5/17/2020 Yes    Elevated liver enzymes 5/17/2020 Yes          Plan:        S/p hiatal hernia repair with Mark  fundoplication ( early in 7/0840)       Gastric outlet obstruction/ pylorus paralysis: S/P pylorotomy on 5/22, still not tolerating TF, held again , UGI FT ordered this am by surgery    Status post cholecystectomy per surgery on 5/22     Dilated CBD on MRCP : S/P ERCP 5/21 with sphincterotomy, balloon sweeping and stent placement for CBD distal stricture    Severe esophagitis/gastritis and stenotic pylorus on EGD: S/p balloon dilatation    Recurrent UTI: No evidence of current UTI, no need for antibiotics, ID on board.     At risk of malnutrition: Continue TPN for now    Fever overnight:

## 2020-05-26 NOTE — PROGRESS NOTES
Group Topic:  Behavioral Activation Group    Date: 2/10/2020  Start Time: 11:00 AM  End Time: 12:00 PM  Facilitators: Cullen Buck; Celestino Murphy LPC    Focus: Goals  Number in attendance: 10    Licensed Provider Directing Treatment: Celestino Murphy LPC    Documentation Completed By (Under Supervision of Licensed Provider):   Cullen Buck, Counseling Intern    I was present and agree with the content of the note.   Celestino Murphy LPC    Attendance: Present  Patient Response: Attentive, Hyperverbal, and Good eye contact    Pt's goals are to avoid worrying as well as overthinking, to clean her apartment, to work more hrs, listen to music, play her PS4, and possibly go for a walk.        Surgery Progress Note            PATIENT NAME: Raman Meza     TODAY'S DATE: 5/26/2020, 7:10 AM    SUBJECTIVE:    Pt seen and examined. Febrile x2 overnight. Emesis after starting tube feeds - were discontinued. States she feels lousy this AM. Reports nausea and abdominal pain. OBJECTIVE:   VITALS:  /68   Pulse 92   Temp 101.1 °F (38.4 °C) (Oral)   Resp 16   Ht 5' 3\" (1.6 m)   Wt 183 lb 2 oz (83.1 kg)   SpO2 96%   BMI 32.44 kg/m²      INTAKE/OUTPUT:      Intake/Output Summary (Last 24 hours) at 5/26/2020 0710  Last data filed at 5/26/2020 0324  Gross per 24 hour   Intake 1050 ml   Output 750 ml   Net 300 ml       PHYSICAL EXAM  General Appearance: in no acute distress  Skin:  Warm and dry   Head/face:  NCAT and sclera anicteric   Lungs:  Respirations even and unlabored  Heart: RRR  Abdomen:  Soft, mild distention, minimal tenderness. No rebound or guarding. Incisions c/d/io w/ skin glue. G tube intact and site clean and dry.    Extremities: no edema and no cyanosis          Data:  CBC with Differential:    Lab Results   Component Value Date    WBC 6.2 05/26/2020    RBC 3.24 05/26/2020    HGB 8.7 05/26/2020    HCT 30.4 05/26/2020     05/26/2020    MCV 93.8 05/26/2020    MCH 26.9 05/26/2020    MCHC 28.6 05/26/2020    RDW 17.8 05/26/2020    NRBC 1 05/12/2020    LYMPHOPCT 14 05/26/2020    MONOPCT 11 05/26/2020    BASOPCT 0 05/26/2020    MONOSABS 0.68 05/26/2020    LYMPHSABS 0.84 05/26/2020    EOSABS 0.08 05/26/2020    BASOSABS <0.03 05/26/2020    DIFFTYPE NOT REPORTED 05/26/2020     BMP:    Lab Results   Component Value Date     05/26/2020    K 4.2 05/26/2020     05/26/2020    CO2 23 05/26/2020    BUN 16 05/26/2020    LABALBU 2.8 05/23/2020    CREATININE 0.63 05/26/2020    CALCIUM 8.6 05/26/2020    GFRAA >60 05/26/2020    LABGLOM >60 05/26/2020    GLUCOSE 121 05/26/2020       Radiology Review:    EXAMINATION:   RIGHT UPPER QUADRANT ULTRASOUND       5/16/2020 12:45 pm

## 2020-05-26 NOTE — PROGRESS NOTES
procedure she developed acute respiratory distress and a fever of 39.8. She was emergently intubated and started on Meropenem and Vancomycin. · Patient improved with treatment. · Transferred to [facility] AT [facility] on 4-6-20   · ransferred back to [facility] on 5-12-20 because of persistent vomiting, abdominal pain. Concern for possible SBO. · Found to have Severe esophagitis, nodular gastritis, pyloric stenosis by EGD on 5-18-20. S/P pyloric balloon dilatation. · Underwent cholecystectomy and pyloroplasty 5-22-20  · On meropenem perioperatively. · Running low grade fevers. RUL infiltrate improved. Possible effusion/consolidation LLL     Infection Control Recommendations   · [place] Precautions    Antimicrobial Stewardship Recommendations     · Discontinuation of therapy  Coordination of Outpatient Care:     · Estimated Length of IV antimicrobials:None  · Patient will need Midline Catheter Insertion: No  · Patient will need PICC line Insertion:No  · [facility]  [facility]: TBD  · Patient will need outpatient wound care:Yes  Chief complaint/reason for consultation:   · Intraabdominal infection  · Pneumonia    History of Present Illness:   Vin Abel is a 76y.o.-year-old  female who was initially admitted on 5/12/2020. Patient seen at the request of Izaiah Mcghee. INITIAL EVALUATION:     Patient known to my service from prior evaluation at [place] [address].     Patient has a history of severe GERD despite being on PPI, H2 blocker and Tums. She underwent laparoscopic robotic hiatal hernia repair and fundoplication last month on 02/24/2020 and postoperatively she developed hypoxia. CT chest done on 02/26/2020 showed bibasilar atelectasis/consolidation and patchy area of infiltrate in the right upper lobe. Patient was discharged on home oxygen on 2-28.     She [facility] with worsening shortness of breath, dyspnea as well as an episode of dark red emesis.  Her SaO2 was 60 % tender  Few bowel sounds  Abdominal incision clean and dry  G tube site clean and dry    Tracheostomy capped. Tracheal secretions scant, whitish.      On TPN     Pertinent Labs and X rays reviewed: 5/26/2020       BUN: 24-->19-->21->31-->22-->16  Cr. :0.69-->1.0-->0.79->0.76-->0.81-->0.63     WBC: 7.7-->13.5-->10.0->8.9-->7.4-->8.4-->6.2  Hb:8.8-->9.0->9.5-->10-->9.8-->8.8-->9.0-->8.7  Plat: 179-->268-->327->342-->339-->255-->238     Cultures:  Urine:  · 4-11-20: No growth  Blood:  · 4-11-20: No growth  · 5-3 x 2 no growth  · 5-14 x 2 no growth to date  Sputum :  ·    Wound:  ·    5-14 COVID negative      CXR 4-11-20: cardiomegaly, lungs clear  KUB 4-13-20; Normal  CT Abdomen 4-13-20: Few gallstones with mild GB distension. Otherwise abdomen normal.  Bilateral pulmonary basilar infiltrates. Abd XR 4-27-20: Dilated loops of bowel. Ileus  CXR 4-27-20: Vascular congestion, small Lt pleural effusion  CXR 5-6-20:  Cardiomegaly with Rt side infiltrate  CXR 5-8-20: Cardiomegaly with minimal RUL infiltrate   CXR 5-13-20: Moderate edema improved  CXR 5-24-20: LLL opacity. Improvement prior RUL infiltrate  CXR 5-26-20: vascular congestion. Worsening LLL opacity suggesting pleural effusion. FL UGI 5-26-20: No gastric outlet obstruction    Discussed with patient, RN. I have personally reviewed the past medical history, past surgical history, medications, social history, and family history, and I have updated the database accordingly.   Past Medical History:     Past Medical History:   Diagnosis Date    Anxiety     Arthritis     Back pain     Bronchitis     Caffeine use     8 coffee / day    Carpal tunnel syndrome     Cataracts, bilateral     Constipation     Depression     Diarrhea     GERD (gastroesophageal reflux disease)     H/O gastric ulcer     Hyperlipidemia     Hypertension     Mumps     MVP (mitral valve prolapse)     Dr. Nichole Prader in May 2019    Recurrent UTI     Dr. Garay Even    Sinusitis     gallbladder distention.        Electronically Signed By: Dr. Jaskaran Espinoza M.D. 04/13/2020 12:10:34 EDT     XR ABDOMEN KUB SUPINE:4-13-20     Clinical statement: constipation.     Comparison: 4/6/2020.     Findings: A percutaneous gastrostomy tube appears in satisfactory position overlying the stomach.  A nonobstructive bowel gas pattern is noted. There is normal amount of stool appreciated within the colon. Katty Clock is no free air. There are no abnormal   masses or calcifications. The osseous structures and soft tissues are unremarkable.     IMPRESSION:     No acute finding. PEG tube is in place.        Electronically Signed By: Dr. Dennis Dempsey M.D. 04/13/2020 9:45:23 EDT    EXAMINATION:   SINGLE CONTRAST UPPER GI SERIES       5/26/2020       HISTORY:   ORDERING SYSTEM PROVIDED HISTORY: Gastric outlet obstruction. Please do   through PEG   TECHNOLOGIST PROVIDED HISTORY:   Please do UGI through PEG   Gastric outlet obstruction.  Please do through PEG       COMPARISON:   None.       TECHNIQUE:   Multiple single contrast images of the junction and stomach were obtained   following the oral administration of 200 mL of Omnipaque 240 through the   indwelling percutaneous gastrostomy tube as per ordering instructions.       FLUOROSCOPY DOSE AND TYPE OR TIME AND EXPOSURES:   DAP [id number]       FINDINGS:   There is prompt filling of the stomach.  No extravasation of contrast from   the gastrostomy tube of stomach.       There is no evidence for gastric distension.  Normal configuration of the   stomach.  Prompt passage of contrast from stomach into nondilated duodenal   loops and a few jejunal loops.           Impression   No fluoroscopic evidence for gastric outlet obstruction.             Medical Decision [id number]:       Medical Decision Making-Other:     Note:  · Labs, medications, radiologic studies were reviewed with personal review of films  · Large amounts of data were reviewed  · Discussed with

## 2020-05-27 ENCOUNTER — APPOINTMENT (OUTPATIENT)
Dept: GENERAL RADIOLOGY | Age: 75
DRG: 326 | End: 2020-05-27
Attending: FAMILY MEDICINE
Payer: MEDICARE

## 2020-05-27 LAB
-: NORMAL
ABSOLUTE EOS #: 0.2 K/UL (ref 0–0.44)
ABSOLUTE IMMATURE GRANULOCYTE: 0.08 K/UL (ref 0–0.3)
ABSOLUTE LYMPH #: 1.01 K/UL (ref 1.1–3.7)
ABSOLUTE MONO #: 0.73 K/UL (ref 0.1–1.2)
AMORPHOUS: NORMAL
ANION GAP SERPL CALCULATED.3IONS-SCNC: 11 MMOL/L (ref 9–17)
BACTERIA: NORMAL
BASOPHILS # BLD: 0 % (ref 0–2)
BASOPHILS ABSOLUTE: <0.03 K/UL (ref 0–0.2)
BILIRUBIN URINE: NEGATIVE
BUN BLDV-MCNC: 17 MG/DL (ref 8–23)
BUN/CREAT BLD: ABNORMAL (ref 9–20)
CALCIUM SERPL-MCNC: 8.9 MG/DL (ref 8.6–10.4)
CASTS UA: NORMAL /LPF (ref 0–8)
CHLORIDE BLD-SCNC: 100 MMOL/L (ref 98–107)
CO2: 26 MMOL/L (ref 20–31)
COLOR: YELLOW
COMMENT UA: ABNORMAL
CREAT SERPL-MCNC: 0.65 MG/DL (ref 0.5–0.9)
CRYSTALS, UA: NORMAL /HPF
DIFFERENTIAL TYPE: ABNORMAL
EOSINOPHILS RELATIVE PERCENT: 3 % (ref 1–4)
EPITHELIAL CELLS UA: NORMAL /HPF (ref 0–5)
GFR AFRICAN AMERICAN: >60 ML/MIN
GFR NON-AFRICAN AMERICAN: >60 ML/MIN
GFR SERPL CREATININE-BSD FRML MDRD: ABNORMAL ML/MIN/{1.73_M2}
GFR SERPL CREATININE-BSD FRML MDRD: ABNORMAL ML/MIN/{1.73_M2}
GLUCOSE BLD-MCNC: 103 MG/DL (ref 65–105)
GLUCOSE BLD-MCNC: 104 MG/DL (ref 65–105)
GLUCOSE BLD-MCNC: 110 MG/DL (ref 65–105)
GLUCOSE BLD-MCNC: 113 MG/DL (ref 70–99)
GLUCOSE BLD-MCNC: 126 MG/DL (ref 65–105)
GLUCOSE URINE: NEGATIVE
HCT VFR BLD CALC: 28.3 % (ref 36.3–47.1)
HEMOGLOBIN: 8.3 G/DL (ref 11.9–15.1)
IMMATURE GRANULOCYTES: 1 %
KETONES, URINE: NEGATIVE
LEUKOCYTE ESTERASE, URINE: NEGATIVE
LYMPHOCYTES # BLD: 17 % (ref 24–43)
MAGNESIUM: 2 MG/DL (ref 1.6–2.6)
MCH RBC QN AUTO: 26.3 PG (ref 25.2–33.5)
MCHC RBC AUTO-ENTMCNC: 29.3 G/DL (ref 28.4–34.8)
MCV RBC AUTO: 89.8 FL (ref 82.6–102.9)
MONOCYTES # BLD: 12 % (ref 3–12)
MUCUS: NORMAL
NITRITE, URINE: NEGATIVE
NRBC AUTOMATED: 0 PER 100 WBC
OTHER OBSERVATIONS UA: NORMAL
PDW BLD-RTO: 18.3 % (ref 11.8–14.4)
PH UA: >9 (ref 5–8)
PHOSPHORUS: 3.2 MG/DL (ref 2.6–4.5)
PLATELET # BLD: 262 K/UL (ref 138–453)
PLATELET ESTIMATE: ABNORMAL
PMV BLD AUTO: 12.5 FL (ref 8.1–13.5)
POTASSIUM SERPL-SCNC: 4.5 MMOL/L (ref 3.7–5.3)
PROTEIN UA: ABNORMAL
RBC # BLD: 3.15 M/UL (ref 3.95–5.11)
RBC # BLD: ABNORMAL 10*6/UL
RBC UA: NORMAL /HPF (ref 0–4)
RENAL EPITHELIAL, UA: NORMAL /HPF
SEG NEUTROPHILS: 67 % (ref 36–65)
SEGMENTED NEUTROPHILS ABSOLUTE COUNT: 4.06 K/UL (ref 1.5–8.1)
SODIUM BLD-SCNC: 137 MMOL/L (ref 135–144)
SPECIFIC GRAVITY UA: 1.01 (ref 1–1.03)
TRICHOMONAS: NORMAL
TURBIDITY: ABNORMAL
URINE HGB: ABNORMAL
UROBILINOGEN, URINE: NORMAL
WBC # BLD: 6.1 K/UL (ref 3.5–11.3)
WBC # BLD: ABNORMAL 10*3/UL
WBC UA: NORMAL /HPF (ref 0–5)
YEAST: NORMAL

## 2020-05-27 PROCEDURE — 6360000002 HC RX W HCPCS: Performed by: SURGERY

## 2020-05-27 PROCEDURE — 94760 N-INVAS EAR/PLS OXIMETRY 1: CPT

## 2020-05-27 PROCEDURE — 87324 CLOSTRIDIUM AG IA: CPT

## 2020-05-27 PROCEDURE — 2580000003 HC RX 258: Performed by: SURGERY

## 2020-05-27 PROCEDURE — 80048 BASIC METABOLIC PNL TOTAL CA: CPT

## 2020-05-27 PROCEDURE — 2580000003 HC RX 258: Performed by: INTERNAL MEDICINE

## 2020-05-27 PROCEDURE — 6370000000 HC RX 637 (ALT 250 FOR IP): Performed by: FAMILY MEDICINE

## 2020-05-27 PROCEDURE — 84100 ASSAY OF PHOSPHORUS: CPT

## 2020-05-27 PROCEDURE — 2700000000 HC OXYGEN THERAPY PER DAY

## 2020-05-27 PROCEDURE — 99232 SBSQ HOSP IP/OBS MODERATE 35: CPT | Performed by: FAMILY MEDICINE

## 2020-05-27 PROCEDURE — 6370000000 HC RX 637 (ALT 250 FOR IP): Performed by: SURGERY

## 2020-05-27 PROCEDURE — 6360000002 HC RX W HCPCS: Performed by: INTERNAL MEDICINE

## 2020-05-27 PROCEDURE — 99232 SBSQ HOSP IP/OBS MODERATE 35: CPT | Performed by: INTERNAL MEDICINE

## 2020-05-27 PROCEDURE — 83735 ASSAY OF MAGNESIUM: CPT

## 2020-05-27 PROCEDURE — 2500000003 HC RX 250 WO HCPCS: Performed by: FAMILY MEDICINE

## 2020-05-27 PROCEDURE — C9113 INJ PANTOPRAZOLE SODIUM, VIA: HCPCS | Performed by: SURGERY

## 2020-05-27 PROCEDURE — 99024 POSTOP FOLLOW-UP VISIT: CPT | Performed by: SURGERY

## 2020-05-27 PROCEDURE — 85025 COMPLETE CBC W/AUTO DIFF WBC: CPT

## 2020-05-27 PROCEDURE — 97535 SELF CARE MNGMENT TRAINING: CPT

## 2020-05-27 PROCEDURE — 36415 COLL VENOUS BLD VENIPUNCTURE: CPT

## 2020-05-27 PROCEDURE — 87449 NOS EACH ORGANISM AG IA: CPT

## 2020-05-27 PROCEDURE — 1200000000 HC SEMI PRIVATE

## 2020-05-27 PROCEDURE — 71045 X-RAY EXAM CHEST 1 VIEW: CPT

## 2020-05-27 RX ADMIN — Medication 10 ML: at 09:00

## 2020-05-27 RX ADMIN — CLONAZEPAM 0.5 MG: 0.5 TABLET ORAL at 21:16

## 2020-05-27 RX ADMIN — SUCRALFATE 1 G: 1 TABLET ORAL at 05:37

## 2020-05-27 RX ADMIN — METOCLOPRAMIDE 10 MG: 5 INJECTION, SOLUTION INTRAMUSCULAR; INTRAVENOUS at 17:48

## 2020-05-27 RX ADMIN — BUSPIRONE HYDROCHLORIDE 20 MG: 10 TABLET ORAL at 13:42

## 2020-05-27 RX ADMIN — Medication 10 ML: at 21:22

## 2020-05-27 RX ADMIN — ACETAMINOPHEN 650 MG: 325 TABLET ORAL at 09:26

## 2020-05-27 RX ADMIN — PANTOPRAZOLE SODIUM 40 MG: 40 INJECTION, POWDER, LYOPHILIZED, FOR SOLUTION INTRAVENOUS at 09:26

## 2020-05-27 RX ADMIN — ENOXAPARIN SODIUM 40 MG: 40 INJECTION SUBCUTANEOUS at 09:26

## 2020-05-27 RX ADMIN — MEROPENEM 1 G: 1 INJECTION, POWDER, FOR SOLUTION INTRAVENOUS at 13:43

## 2020-05-27 RX ADMIN — POTASSIUM CHLORIDE: 2 INJECTION, SOLUTION, CONCENTRATE INTRAVENOUS at 17:48

## 2020-05-27 RX ADMIN — SUCRALFATE 1 G: 1 TABLET ORAL at 12:34

## 2020-05-27 RX ADMIN — GABAPENTIN 300 MG: 300 CAPSULE ORAL at 21:16

## 2020-05-27 RX ADMIN — ESCITALOPRAM OXALATE 20 MG: 10 TABLET ORAL at 09:26

## 2020-05-27 RX ADMIN — FUROSEMIDE 20 MG: 20 TABLET ORAL at 09:26

## 2020-05-27 RX ADMIN — LORAZEPAM 1 MG: 2 INJECTION INTRAMUSCULAR at 08:12

## 2020-05-27 RX ADMIN — AMITRIPTYLINE HYDROCHLORIDE 25 MG: 25 TABLET, FILM COATED ORAL at 09:26

## 2020-05-27 RX ADMIN — METOCLOPRAMIDE 10 MG: 5 INJECTION, SOLUTION INTRAMUSCULAR; INTRAVENOUS at 04:12

## 2020-05-27 RX ADMIN — TRAZODONE HYDROCHLORIDE 100 MG: 100 TABLET ORAL at 21:16

## 2020-05-27 RX ADMIN — QUETIAPINE FUMARATE 100 MG: 100 TABLET ORAL at 21:16

## 2020-05-27 RX ADMIN — AMITRIPTYLINE HYDROCHLORIDE 25 MG: 25 TABLET, FILM COATED ORAL at 13:42

## 2020-05-27 RX ADMIN — PANTOPRAZOLE SODIUM 40 MG: 40 INJECTION, POWDER, LYOPHILIZED, FOR SOLUTION INTRAVENOUS at 21:16

## 2020-05-27 RX ADMIN — AMITRIPTYLINE HYDROCHLORIDE 25 MG: 25 TABLET, FILM COATED ORAL at 21:16

## 2020-05-27 RX ADMIN — GABAPENTIN 300 MG: 300 CAPSULE ORAL at 09:26

## 2020-05-27 RX ADMIN — METOCLOPRAMIDE 10 MG: 5 INJECTION, SOLUTION INTRAMUSCULAR; INTRAVENOUS at 12:34

## 2020-05-27 RX ADMIN — BUSPIRONE HYDROCHLORIDE 20 MG: 10 TABLET ORAL at 09:26

## 2020-05-27 RX ADMIN — MEROPENEM 1 G: 1 INJECTION, POWDER, FOR SOLUTION INTRAVENOUS at 21:56

## 2020-05-27 RX ADMIN — TAMSULOSIN HYDROCHLORIDE 0.4 MG: 0.4 CAPSULE ORAL at 09:26

## 2020-05-27 RX ADMIN — METOCLOPRAMIDE 10 MG: 5 INJECTION, SOLUTION INTRAMUSCULAR; INTRAVENOUS at 21:56

## 2020-05-27 RX ADMIN — METOPROLOL SUCCINATE 25 MG: 25 TABLET, FILM COATED, EXTENDED RELEASE ORAL at 10:05

## 2020-05-27 RX ADMIN — MEROPENEM 1 G: 1 INJECTION, POWDER, FOR SOLUTION INTRAVENOUS at 01:46

## 2020-05-27 RX ADMIN — CLONAZEPAM 0.5 MG: 0.5 TABLET ORAL at 09:29

## 2020-05-27 RX ADMIN — LORAZEPAM 1 MG: 2 INJECTION INTRAMUSCULAR at 17:54

## 2020-05-27 RX ADMIN — GABAPENTIN 300 MG: 300 CAPSULE ORAL at 13:42

## 2020-05-27 RX ADMIN — SUCRALFATE 1 G: 1 TABLET ORAL at 17:48

## 2020-05-27 RX ADMIN — BUSPIRONE HYDROCHLORIDE 20 MG: 10 TABLET ORAL at 21:16

## 2020-05-27 ASSESSMENT — PAIN SCALES - GENERAL
PAINLEVEL_OUTOF10: 7
PAINLEVEL_OUTOF10: 8
PAINLEVEL_OUTOF10: 7
PAINLEVEL_OUTOF10: 8
PAINLEVEL_OUTOF10: 0

## 2020-05-27 ASSESSMENT — PAIN DESCRIPTION - PAIN TYPE: TYPE: SURGICAL PAIN

## 2020-05-27 ASSESSMENT — PAIN DESCRIPTION - ORIENTATION: ORIENTATION: RIGHT

## 2020-05-27 ASSESSMENT — PAIN DESCRIPTION - LOCATION: LOCATION: ABDOMEN

## 2020-05-27 ASSESSMENT — PAIN DESCRIPTION - PROGRESSION: CLINICAL_PROGRESSION: NOT CHANGED

## 2020-05-27 NOTE — PROGRESS NOTES
course of Zyvox ordered by ID.     Anxiety continues overnight  VSS- modified barium   COVID swab  esogram possible if warranted by VSS; and possible peg tube eval  Fevers overnight prompting blood cultures x2 urinalysis with culture all pending at current time  Passed swallow study  UGI series show gastric outlet obstruction,GI consult added by surgery    Review of Systems:     Constitutional:  negative for chills, fevers, sweats  Respiratory:  negative for cough, dyspnea on exertion, shortness of breath, wheezing  Cardiovascular:  negative for chest pain, chest pressure/discomfort, lower extremity edema, palpitations  Gastrointestinal:  negative for abdominal pain, constipation, diarrhea, nausea, vomiting  Neurological:  negative for dizziness, headache    Medications: Allergies:     Allergies   Allergen Reactions    Prednisone Anxiety    Compazine [Prochlorperazine Maleate]     Penicillin V Potassium     Penicillins Other (See Comments)     shock       Current Meds:   Scheduled Meds:    meropenem  1 g Intravenous Q8H    sodium chloride flush  10 mL Intravenous 2 times per day    sodium chloride flush  10 mL Intravenous 2 times per day    enoxaparin  40 mg Subcutaneous Daily    pantoprazole  40 mg Intravenous BID    And    sodium chloride (PF)  10 mL Intravenous BID    sucralfate  1 g Oral 4 times per day    metoprolol succinate  25 mg Oral Daily    furosemide  20 mg Oral Daily    bisacodyl  10 mg Rectal Daily    clonazePAM  0.5 mg Oral BID    QUEtiapine  100 mg Oral Nightly    traZODone  100 mg Oral Nightly    tamsulosin  0.4 mg Oral Daily    metoclopramide  10 mg Intravenous Q6H    sodium chloride flush  10 mL Intravenous 2 times per day    amitriptyline  25 mg Oral TID    busPIRone  20 mg Oral TID    escitalopram  20 mg Oral Daily    gabapentin  300 mg Oral TID     Continuous Infusions:    PN-Adult 2-in-1 Central Line (Standard) 60 mL/hr at 05/26/20 1807    dextrose 5 % and 0.45 % opacification.        Physical Examination:        General appearance:  alert, cooperative,looks anxious   Trach noted  Mental Status:  oriented to person, place and time and normal affect  Lungs:  diminished bilaterally, normal effort  Heart:  regular rate and rhythm, no murmur  Abdomen: Peg tube noted,  soft, nontender, nondistended, normal bowel sounds, no masses, hepatomegaly, splenomegaly  Extremities:  no edema, redness, tenderness in the calves  Skin:  no gross lesions, rashes, induration    Assessment:        Hospital Problems           Last Modified POA    * (Principal) Gastric outlet obstruction 5/16/2020 Yes    MVP (mitral valve prolapse) 5/12/2020 Yes    Dysphagia 5/22/2020 Yes    Centrilobular emphysema ([address] 5/12/2020 Yes    Fever 5/14/2020 Yes    Recurrent UTI 5/12/2020 Yes    Overview Signed 5/12/2020  5:22 PM by MD Dr. Angelica Clayton         Tracheostomy in place [facility]) 5/12/2020 Yes    H/O gastric ulcer 5/12/2020 Yes    Hyperlipidemia 5/12/2020 Yes    Hypertension 5/12/2020 Yes    Tobacco abuse 5/12/2020 Yes    Chronic respiratory failure with hypoxia ([address] 5/12/2020 Yes    Status post insertion of percutaneous endoscopic gastrostomy (PEG) tube ([address] 5/12/2020 Yes    S/P Nissen fundoplication (without gastrostomy tube) procedure 5/12/2020 Yes    Paralytic ileus ([address] 5/17/2020 Yes    Elevated liver enzymes 5/17/2020 Yes          Plan:        S/p hiatal hernia repair with Mark  fundoplication ( early in 6/0435)       Gastric outlet obstruction/ pylorus paralysis: S/P pylorotomy on 5/22, still not tolerating TF, held again , UGI FT ordered this am by surgery    Status post cholecystectomy per surgery on 5/22     Dilated CBD on MRCP : S/P ERCP 5/21 with sphincterotomy, balloon sweeping and stent placement for CBD distal stricture    Severe esophagitis/gastritis and stenotic pylorus on EGD: S/p balloon dilatation    Recurrent UTI: No evidence of current UTI    At risk of malnutrition: Continue

## 2020-05-27 NOTE — PLAN OF CARE
Problem: OXYGENATION/RESPIRATORY FUNCTION  Intervention: Oxygen administration  Note:   PROVIDE ADEQUATE OXYGENATION WITH ACCEPTABLE SP02/ABG'S    [x]  IDENTIFY APPROPRIATE OXYGEN THERAPY  [x]   MONITOR SP02/ABG'S AS NEEDED   [x]   PATIENT EDUCATION AS NEEDED

## 2020-05-27 NOTE — PROGRESS NOTES
Trach care performed. Site cleansed. Inner cannula changed. Site is clean. Strong NPC.  Pt. Declined to be suctioned

## 2020-05-27 NOTE — PROGRESS NOTES
bleeding. · After the procedure she developed acute respiratory distress and a fever of 39.8. She was emergently intubated and started on Meropenem and Vancomycin. · Patient improved with treatment. · Transferred to [facility] on 4-6-20   · ransferred back to [facility] on 5-12-20 because of persistent vomiting, abdominal pain. Concern for possible SBO. · Found to have Severe esophagitis, nodular gastritis, pyloric stenosis by EGD on 5-18-20. S/P pyloric balloon dilatation. · Underwent cholecystectomy and pyloroplasty 5-22-20  · On meropenem perioperatively. · Running low grade fevers. RUL infiltrate improved. Possible effusion/consolidation LLL     Infection Control Recommendations   · [place] Precautions    Antimicrobial Stewardship Recommendations     · Discontinuation of therapy  Coordination of Outpatient Care:     · Estimated Length of IV antimicrobials:None  · Patient will need Midline Catheter Insertion: No  · Patient will need PICC line Insertion:No  · Patient will need: Home IV , [facility],  SNF: TBD  · Patient will need outpatient wound care:Yes  Chief complaint/reason for consultation:   · Intraabdominal infection  · Pneumonia    History of Present Illness:   Maya Jung is a 76y.o.-year-old  female who was initially admitted on 5/12/2020. Patient seen at the request of Arabella Herndon. INITIAL EVALUATION:     Patient known to my service from prior evaluation at [place] [address].     Patient has a history of severe GERD despite being on PPI, H2 blocker and Tums. She underwent laparoscopic robotic hiatal hernia repair and fundoplication last month on 02/24/2020 and postoperatively she developed hypoxia. CT chest done on 02/26/2020 showed bibasilar atelectasis/consolidation and patchy area of infiltrate in the right upper lobe.  Patient was discharged on home oxygen on 2-28.     She presented back to [facility] ER on 2-29 with worsening shortness of breath, dyspnea as well as an episode of dark red 8 coffee / day    Carpal tunnel syndrome     Cataracts, bilateral     Constipation     Depression     Diarrhea     GERD (gastroesophageal reflux disease)     H/O gastric ulcer     Hyperlipidemia     Hypertension     Mumps     MVP (mitral valve prolapse)     Dr. Patricia Lopez in May 2019    Recurrent UTI     Dr. Dale Canseco    Sinusitis     Trache[facility] in place [facility])     Ulcerative esophagitis     Wellness examination     Dr. Magda Kowalski seen in Jan 2020       Past Surgical  History:     Past Surgical History:   Procedure Laterality Date    APPENDECTOMY     [address], LAPAROSCOPIC  05/22/2020     LAPAROSCOPIC ROBOTIC CHOLECYSTECTOMY, PYLOROPLASTY     CHOLECYSTECTOMY, LAPAROSCOPIC N/A 5/22/2020    XI LAPAROSCOPIC ROBOTIC CHOLECYSTECTOMY, PYLOROPLASTY performed by Senait Anand MD at [address]      EGD  3/17/2020         ERCP  05/21/2020    with stent insertion, balloon dilation, sphinctereotomy    ERCP  5/21/2020    ** CASE IN OR WITY GI STAFF** ERCP STENT INSERTION performed by Leobardo Lara MD at [facility]    ERCP  5/21/2020    ** CASE IN OR WITH GI STAFF**ERCP SPHINCTER/PAPILLOTOMY performed by Leobardo Lara MD at [facility]    ERCP  5/21/2020    ** CASE IN OR WITH GI STAFF**ERCP DILATION BALLOON performed by Leobardo Lara MD at [address]      patricia IOL    GASTRIC FUNDOPLICATION N/A 1/79/2359    XI LAPAROSCOPIC ROBOTIC HIATAL HERNIA REPAIR, CHERYL FUNDOPLICATION performed by Senait Anand MD at [facility] N/A 3/27/2020    EGD PEG TUBE PLACEMENT performed by Senait Anand MD at [address] [facility]. CATH POWER PICC TRIPLE  3/4/2020         HYSTERECTOMY      Abdominal    JOINT REPLACEMENT Right     right hip    OVARY REMOVAL      RHINOPLASTY      TONSILLECTOMY      TOTAL HIP ARTHROPLASTY      TOTAL NEPHRECTOMY      atrophic from infections, age 14    pain  Musculoskeletal: No muscle aches or pains. No joint effusions, swelling or deformities  Hematologic: No bleeding or bruising. Neurologic: No headache, weakness, numbness, or tingling. Integument: No rash, no ulcers. Psychiatric: No depression. Endocrine: No polyuria, no polydipsia, no polyphagia. Physical Examination :     Patient Vitals for the past 8 hrs:   BP Temp Temp src Pulse Resp SpO2   05/27/20 1230 (!) 93/52 100.2 °F (37.9 °C) Oral 60 18 --   05/27/20 1210 -- -- -- -- 20 97 %   05/27/20 0920 -- -- -- -- 24 96 %   05/27/20 0910 (!) 148/71 102.4 °F (39.1 °C) Oral 118 20 97 %     General Appearance: Awake, alert, and in no apparent distress  Head:  Normocephalic, no trauma  Eyes: Pupils equal, round, reactive to light and accommodation; extraocular movements intact; sclera anicteric; conjunctivae pink. No embolic phenomena. ENT: Oropharynx clear, without erythema, exudate, or thrush. No tenderness of sinuses. Mouth/throat: mucosa pink and moist. No lesions. Dentition in good repair. Neck:Supple, without lymphadenopathy. Thyroid normal, No bruits. Tracheostomy in place. Pulmonary/Chest: Clear to auscultation, without wheezes, rales, or rhonchi. No dullness to percussion. Cardiovascular: Regular rate and rhythm without murmurs, rubs, or gallops. Abdomen: Soft, non tender. Bowel sounds normal. No organomegaly. PEG in place. Surgical incisions without inflammation. All four Extremities: No cyanosis, clubbing, edema, or effusions. Neurologic: No gross sensory or motor deficits. Skin: Warm and dry with good turgor. No signs of peripheral arterial or venous insufficiency. No ulcerations.  No open wounds.         Medical Decision Making -Laboratory:   I have independently reviewed/ordered the following labs:    CBC with Differential:   Recent Labs     05/26/20  0556 05/27/20  0459   WBC 6.2 6.1   HGB 8.7* 8.3*   HCT 30.4* 28.3*    262   LYMPHOPCT 14* 17*   MONOPCT 11 12     BMP:   Recent

## 2020-05-27 NOTE — PROGRESS NOTES
Surgery Progress Note            PATIENT NAME: Elvia Grace     TODAY'S DATE: 5/27/2020, 6:49 AM    SUBJECTIVE:    Pt seen and examined. Afebrile overnight. Tolerated TF and had jello at dinner time. No emesis. Did not require pain medicine overnight per RN. + BM. OBJECTIVE:   VITALS:  BP (!) 109/56   Pulse 88   Temp 99.1 °F (37.3 °C) (Oral)   Resp 18   Ht 5' 3\" (1.6 m)   Wt 195 lb 3 oz (88.5 kg)   SpO2 98%   BMI 34.58 kg/m²      INTAKE/OUTPUT:      Intake/Output Summary (Last 24 hours) at 5/27/2020 6990  Last data filed at 5/27/2020 0600  Gross per 24 hour   Intake 3223 ml   Output 4800 ml   Net -1577 ml       PHYSICAL EXAM  General Appearance: in no acute distress  Skin:  Warm and dry   Head/face:  NCAT and sclera anicteric   Lungs:  Respirations even and unlabored  Heart: RRR  Abdomen:  Soft, mild distention, minimal tenderness around umbilicus. No rebound or guarding. Incisions c/d/io w/ skin glue. G tube intact and site clean and dry.    Extremities: no edema and no cyanosis          Data:  CBC with Differential:    Lab Results   Component Value Date    WBC 6.1 05/27/2020    RBC 3.15 05/27/2020    HGB 8.3 05/27/2020    HCT 28.3 05/27/2020     05/27/2020    MCV 89.8 05/27/2020    MCH 26.3 05/27/2020    MCHC 29.3 05/27/2020    RDW 18.3 05/27/2020    NRBC 1 05/12/2020    LYMPHOPCT 17 05/27/2020    MONOPCT 12 05/27/2020    BASOPCT 0 05/27/2020    MONOSABS 0.73 05/27/2020    LYMPHSABS 1.01 05/27/2020    EOSABS 0.20 05/27/2020    BASOSABS <0.03 05/27/2020    DIFFTYPE NOT REPORTED 05/27/2020     BMP:    Lab Results   Component Value Date     05/27/2020    K 4.5 05/27/2020     05/27/2020    CO2 26 05/27/2020    BUN 17 05/27/2020    LABALBU 2.8 05/23/2020    CREATININE 0.65 05/27/2020    CALCIUM 8.9 05/27/2020    GFRAA >60 05/27/2020    LABGLOM >60 05/27/2020    GLUCOSE 113 05/27/2020       Radiology Review:    EXAMINATION:   RIGHT UPPER QUADRANT ULTRASOUND       5/16/2020 12:45 pm

## 2020-05-28 LAB
ABSOLUTE EOS #: 0.22 K/UL (ref 0–0.44)
ABSOLUTE IMMATURE GRANULOCYTE: 0.12 K/UL (ref 0–0.3)
ABSOLUTE LYMPH #: 1.06 K/UL (ref 1.1–3.7)
ABSOLUTE MONO #: 0.61 K/UL (ref 0.1–1.2)
ALBUMIN SERPL-MCNC: 2.1 G/DL (ref 3.5–5.2)
ALBUMIN/GLOBULIN RATIO: 0.6 (ref 1–2.5)
ALP BLD-CCNC: 155 U/L (ref 35–104)
ALT SERPL-CCNC: 168 U/L (ref 5–33)
ANION GAP SERPL CALCULATED.3IONS-SCNC: 10 MMOL/L (ref 9–17)
AST SERPL-CCNC: 103 U/L
BASOPHILS # BLD: 0 % (ref 0–2)
BASOPHILS ABSOLUTE: <0.03 K/UL (ref 0–0.2)
BILIRUB SERPL-MCNC: 0.21 MG/DL (ref 0.3–1.2)
BUN BLDV-MCNC: 18 MG/DL (ref 8–23)
BUN/CREAT BLD: ABNORMAL (ref 9–20)
CALCIUM SERPL-MCNC: 9.1 MG/DL (ref 8.6–10.4)
CHLORIDE BLD-SCNC: 103 MMOL/L (ref 98–107)
CO2: 24 MMOL/L (ref 20–31)
CREAT SERPL-MCNC: 0.57 MG/DL (ref 0.5–0.9)
CULTURE: ABNORMAL
DIFFERENTIAL TYPE: ABNORMAL
EOSINOPHILS RELATIVE PERCENT: 4 % (ref 1–4)
GFR AFRICAN AMERICAN: >60 ML/MIN
GFR NON-AFRICAN AMERICAN: >60 ML/MIN
GFR SERPL CREATININE-BSD FRML MDRD: ABNORMAL ML/MIN/{1.73_M2}
GFR SERPL CREATININE-BSD FRML MDRD: ABNORMAL ML/MIN/{1.73_M2}
GLUCOSE BLD-MCNC: 111 MG/DL (ref 65–105)
GLUCOSE BLD-MCNC: 113 MG/DL (ref 65–105)
GLUCOSE BLD-MCNC: 115 MG/DL (ref 70–99)
GLUCOSE BLD-MCNC: 139 MG/DL (ref 65–105)
HCT VFR BLD CALC: 27.7 % (ref 36.3–47.1)
HEMOGLOBIN: 7.9 G/DL (ref 11.9–15.1)
IMMATURE GRANULOCYTES: 2 %
LYMPHOCYTES # BLD: 18 % (ref 24–43)
Lab: ABNORMAL
MCH RBC QN AUTO: 26.6 PG (ref 25.2–33.5)
MCHC RBC AUTO-ENTMCNC: 28.5 G/DL (ref 28.4–34.8)
MCV RBC AUTO: 93.3 FL (ref 82.6–102.9)
MONOCYTES # BLD: 10 % (ref 3–12)
NRBC AUTOMATED: 0 PER 100 WBC
PDW BLD-RTO: 18.4 % (ref 11.8–14.4)
PLATELET # BLD: 249 K/UL (ref 138–453)
PLATELET ESTIMATE: ABNORMAL
PMV BLD AUTO: 11.8 FL (ref 8.1–13.5)
POTASSIUM SERPL-SCNC: 4.5 MMOL/L (ref 3.7–5.3)
RBC # BLD: 2.97 M/UL (ref 3.95–5.11)
RBC # BLD: ABNORMAL 10*6/UL
SEG NEUTROPHILS: 66 % (ref 36–65)
SEGMENTED NEUTROPHILS ABSOLUTE COUNT: 3.99 K/UL (ref 1.5–8.1)
SODIUM BLD-SCNC: 137 MMOL/L (ref 135–144)
SPECIMEN DESCRIPTION: ABNORMAL
TOTAL PROTEIN: 5.4 G/DL (ref 6.4–8.3)
WBC # BLD: 6 K/UL (ref 3.5–11.3)
WBC # BLD: ABNORMAL 10*3/UL

## 2020-05-28 PROCEDURE — 6370000000 HC RX 637 (ALT 250 FOR IP): Performed by: SURGERY

## 2020-05-28 PROCEDURE — 6360000002 HC RX W HCPCS: Performed by: SURGERY

## 2020-05-28 PROCEDURE — 2700000000 HC OXYGEN THERAPY PER DAY

## 2020-05-28 PROCEDURE — 85025 COMPLETE CBC W/AUTO DIFF WBC: CPT

## 2020-05-28 PROCEDURE — 99024 POSTOP FOLLOW-UP VISIT: CPT | Performed by: SURGERY

## 2020-05-28 PROCEDURE — 2580000003 HC RX 258: Performed by: SURGERY

## 2020-05-28 PROCEDURE — 2500000003 HC RX 250 WO HCPCS: Performed by: FAMILY MEDICINE

## 2020-05-28 PROCEDURE — 80053 COMPREHEN METABOLIC PANEL: CPT

## 2020-05-28 PROCEDURE — C9113 INJ PANTOPRAZOLE SODIUM, VIA: HCPCS | Performed by: SURGERY

## 2020-05-28 PROCEDURE — 97110 THERAPEUTIC EXERCISES: CPT

## 2020-05-28 PROCEDURE — 1200000000 HC SEMI PRIVATE

## 2020-05-28 PROCEDURE — 6360000002 HC RX W HCPCS: Performed by: INTERNAL MEDICINE

## 2020-05-28 PROCEDURE — 99232 SBSQ HOSP IP/OBS MODERATE 35: CPT | Performed by: FAMILY MEDICINE

## 2020-05-28 PROCEDURE — 2580000003 HC RX 258: Performed by: INTERNAL MEDICINE

## 2020-05-28 PROCEDURE — 99232 SBSQ HOSP IP/OBS MODERATE 35: CPT | Performed by: INTERNAL MEDICINE

## 2020-05-28 PROCEDURE — 36415 COLL VENOUS BLD VENIPUNCTURE: CPT

## 2020-05-28 PROCEDURE — 82947 ASSAY GLUCOSE BLOOD QUANT: CPT

## 2020-05-28 RX ADMIN — GABAPENTIN 300 MG: 300 CAPSULE ORAL at 22:03

## 2020-05-28 RX ADMIN — LORAZEPAM 1 MG: 2 INJECTION INTRAMUSCULAR at 00:21

## 2020-05-28 RX ADMIN — OXYCODONE HYDROCHLORIDE AND ACETAMINOPHEN 1 TABLET: 5; 325 TABLET ORAL at 09:42

## 2020-05-28 RX ADMIN — OXYCODONE HYDROCHLORIDE AND ACETAMINOPHEN 1 TABLET: 5; 325 TABLET ORAL at 13:46

## 2020-05-28 RX ADMIN — LORAZEPAM 1 MG: 2 INJECTION INTRAMUSCULAR at 13:46

## 2020-05-28 RX ADMIN — METOCLOPRAMIDE 10 MG: 5 INJECTION, SOLUTION INTRAMUSCULAR; INTRAVENOUS at 04:18

## 2020-05-28 RX ADMIN — METOCLOPRAMIDE 10 MG: 5 INJECTION, SOLUTION INTRAMUSCULAR; INTRAVENOUS at 16:55

## 2020-05-28 RX ADMIN — SUCRALFATE 1 G: 1 TABLET ORAL at 06:20

## 2020-05-28 RX ADMIN — GABAPENTIN 300 MG: 300 CAPSULE ORAL at 14:11

## 2020-05-28 RX ADMIN — TAMSULOSIN HYDROCHLORIDE 0.4 MG: 0.4 CAPSULE ORAL at 09:41

## 2020-05-28 RX ADMIN — CLONAZEPAM 0.5 MG: 0.5 TABLET ORAL at 09:40

## 2020-05-28 RX ADMIN — LORAZEPAM 1 MG: 2 INJECTION INTRAMUSCULAR at 09:40

## 2020-05-28 RX ADMIN — AMITRIPTYLINE HYDROCHLORIDE 25 MG: 25 TABLET, FILM COATED ORAL at 09:41

## 2020-05-28 RX ADMIN — OXYCODONE HYDROCHLORIDE AND ACETAMINOPHEN 1 TABLET: 5; 325 TABLET ORAL at 00:21

## 2020-05-28 RX ADMIN — CALCIUM GLUCONATE: 98 INJECTION, SOLUTION INTRAVENOUS at 18:04

## 2020-05-28 RX ADMIN — CLONAZEPAM 0.5 MG: 0.5 TABLET ORAL at 22:03

## 2020-05-28 RX ADMIN — METOCLOPRAMIDE 10 MG: 5 INJECTION, SOLUTION INTRAMUSCULAR; INTRAVENOUS at 09:40

## 2020-05-28 RX ADMIN — ESCITALOPRAM OXALATE 20 MG: 10 TABLET ORAL at 09:41

## 2020-05-28 RX ADMIN — LORAZEPAM 1 MG: 2 INJECTION INTRAMUSCULAR at 18:03

## 2020-05-28 RX ADMIN — OXYCODONE HYDROCHLORIDE AND ACETAMINOPHEN 1 TABLET: 5; 325 TABLET ORAL at 18:04

## 2020-05-28 RX ADMIN — MEROPENEM 1 G: 1 INJECTION, POWDER, FOR SOLUTION INTRAVENOUS at 04:18

## 2020-05-28 RX ADMIN — ENOXAPARIN SODIUM 40 MG: 40 INJECTION SUBCUTANEOUS at 09:41

## 2020-05-28 RX ADMIN — QUETIAPINE FUMARATE 100 MG: 100 TABLET ORAL at 22:03

## 2020-05-28 RX ADMIN — GABAPENTIN 300 MG: 300 CAPSULE ORAL at 09:41

## 2020-05-28 RX ADMIN — MEROPENEM 1 G: 1 INJECTION, POWDER, FOR SOLUTION INTRAVENOUS at 18:41

## 2020-05-28 RX ADMIN — PANTOPRAZOLE SODIUM 40 MG: 40 INJECTION, POWDER, LYOPHILIZED, FOR SOLUTION INTRAVENOUS at 09:41

## 2020-05-28 RX ADMIN — TRAZODONE HYDROCHLORIDE 100 MG: 100 TABLET ORAL at 22:03

## 2020-05-28 RX ADMIN — Medication 10 ML: at 22:03

## 2020-05-28 RX ADMIN — AMITRIPTYLINE HYDROCHLORIDE 25 MG: 25 TABLET, FILM COATED ORAL at 14:11

## 2020-05-28 RX ADMIN — BUSPIRONE HYDROCHLORIDE 20 MG: 10 TABLET ORAL at 09:41

## 2020-05-28 RX ADMIN — SUCRALFATE 1 G: 1 TABLET ORAL at 12:14

## 2020-05-28 RX ADMIN — AMITRIPTYLINE HYDROCHLORIDE 25 MG: 25 TABLET, FILM COATED ORAL at 22:03

## 2020-05-28 RX ADMIN — METOCLOPRAMIDE 10 MG: 5 INJECTION, SOLUTION INTRAMUSCULAR; INTRAVENOUS at 22:03

## 2020-05-28 RX ADMIN — ACETAMINOPHEN 650 MG: 325 TABLET ORAL at 16:55

## 2020-05-28 RX ADMIN — Medication 10 ML: at 09:40

## 2020-05-28 RX ADMIN — SUCRALFATE 1 G: 1 TABLET ORAL at 00:21

## 2020-05-28 RX ADMIN — MEROPENEM 1 G: 1 INJECTION, POWDER, FOR SOLUTION INTRAVENOUS at 10:44

## 2020-05-28 RX ADMIN — SUCRALFATE 1 G: 1 TABLET ORAL at 18:04

## 2020-05-28 RX ADMIN — BUSPIRONE HYDROCHLORIDE 20 MG: 10 TABLET ORAL at 14:11

## 2020-05-28 RX ADMIN — BUSPIRONE HYDROCHLORIDE 20 MG: 10 TABLET ORAL at 22:03

## 2020-05-28 RX ADMIN — PANTOPRAZOLE SODIUM 40 MG: 40 INJECTION, POWDER, LYOPHILIZED, FOR SOLUTION INTRAVENOUS at 22:03

## 2020-05-28 ASSESSMENT — PAIN DESCRIPTION - PAIN TYPE
TYPE: SURGICAL PAIN
TYPE: SURGICAL PAIN

## 2020-05-28 ASSESSMENT — PAIN SCALES - GENERAL
PAINLEVEL_OUTOF10: 7
PAINLEVEL_OUTOF10: 4
PAINLEVEL_OUTOF10: 6
PAINLEVEL_OUTOF10: 6
PAINLEVEL_OUTOF10: 7
PAINLEVEL_OUTOF10: 3
PAINLEVEL_OUTOF10: 6
PAINLEVEL_OUTOF10: 6

## 2020-05-28 ASSESSMENT — PAIN DESCRIPTION - PROGRESSION: CLINICAL_PROGRESSION: GRADUALLY IMPROVING

## 2020-05-28 ASSESSMENT — PAIN DESCRIPTION - ONSET
ONSET: ON-GOING
ONSET: ON-GOING

## 2020-05-28 ASSESSMENT — PAIN DESCRIPTION - FREQUENCY
FREQUENCY: CONTINUOUS
FREQUENCY: CONTINUOUS

## 2020-05-28 ASSESSMENT — PAIN DESCRIPTION - ORIENTATION
ORIENTATION: RIGHT
ORIENTATION: RIGHT

## 2020-05-28 ASSESSMENT — PAIN DESCRIPTION - DESCRIPTORS
DESCRIPTORS: SHARP
DESCRIPTORS: ACHING;SHARP
DESCRIPTORS: ACHING;SHARP

## 2020-05-28 ASSESSMENT — PAIN DESCRIPTION - LOCATION
LOCATION: ABDOMEN
LOCATION: ABDOMEN
LOCATION: ABDOMEN;HEAD

## 2020-05-28 ASSESSMENT — PAIN - FUNCTIONAL ASSESSMENT: PAIN_FUNCTIONAL_ASSESSMENT: PREVENTS OR INTERFERES WITH ALL ACTIVE AND SOME PASSIVE ACTIVITIES

## 2020-05-28 NOTE — PROGRESS NOTES
pain     Bronchitis     Caffeine use     8 coffee / day    Carpal tunnel syndrome     Cataracts, bilateral     Constipation     Depression     Diarrhea     GERD (gastroesophageal reflux disease)     H/O gastric ulcer     Hyperlipidemia     Hypertension     Mumps     MVP (mitral valve prolapse)     Dr. hWit Chilel in May 2019    Recurrent UTI     Dr. Enrico Oliveira    Sinusitis     [facility]     Ulcerative esophagitis     Wellness examination     Dr. Rosalea Sicard seen in Jan 2020       Past Surgical  History:     Past Surgical History:   Procedure Laterality Date    APPENDECTOMY      CARPAL TUNNEL RELEASE      CHOLECYSTECTOMY, LAPAROSCOPIC  05/22/2020     LAPAROSCOPIC ROBOTIC CHOLECYSTECTOMY, PYLOROPLASTY     CHOLECYSTECTOMY, LAPAROSCOPIC N/A 5/22/2020    XI LAPAROSCOPIC ROBOTIC CHOLECYSTECTOMY, PYLOROPLASTY performed by Melanie Rivero MD at [address]      EGD  3/17/2020         ERCP  05/21/2020    with stent insertion, balloon dilation, sphinctereotomy    ERCP  5/21/2020    ** CASE IN OR WITY GI STAFF** ERCP STENT INSERTION performed by James Pham MD at [facility]    ERCP  5/21/2020    ** CASE IN OR WITH GI STAFF**ERCP SPHINCTER/PAPILLOTOMY performed by James Pham MD at [facility]    ERCP  5/21/2020    ** CASE IN OR WITH GI STAFF**ERCP DILATION BALLOON performed by James Pham MD at [address]    GASTRIC FUNDOPLICATION N/A 7/64/7768    XI LAPAROSCOPIC ROBOTIC HIATAL HERNIA REPAIR, CHERYL FUNDOPLICATION performed by Melanie Rivero MD at [address] N/A 3/27/2020    EGD PEG TUBE PLACEMENT performed by Melanie Rivero MD at [address] [facility]. CATH POWER PICC TRIPLE  3/4/2020         HYSTERECTOMY      Abdominal    JOINT REPLACEMENT Right     right hip    OVARY REMOVAL      RHINOPLASTY      TONSILLECTOMY      TOTAL HIP ARTHROPLASTY      TOTAL NEPHRECTOMY atrophic from infections, age 13   [facility] TRACHEOSTOMY N/A 3/27/2020    **ADD ON **WANTS 10:00AM **TRACHEOTOMY performed by Mona Huston MD at [address] N/A 4/2/2020    TRACHEOTOMY EXCHANGE performed by Mona Huston MD at [address] ENDOSCOPY N/A 3/17/2020    BEDSIDE EGD ESOPHAGOGASTRODUODENOSCOPY (ICU) performed by Mona Huston MD at [facility]    UPPER GASTROINTESTINAL ENDOSCOPY N/A 5/18/2020    EGD BIOPSY performed by Pascual Amezquita MD at [facility]  5/18/2020    EGD DILATION BALLOON performed by Pascual Amezquita MD at [facility]       Medications:      meropenem  1 g Intravenous Q8H    sodium chloride flush  10 mL Intravenous 2 times per day    sodium chloride flush  10 mL Intravenous 2 times per day    enoxaparin  40 mg Subcutaneous Daily    pantoprazole  40 mg Intravenous BID    And    sodium chloride (PF)  10 mL Intravenous BID    sucralfate  1 g Oral 4 times per day    metoprolol succinate  25 mg Oral Daily    [Held by provider] furosemide  20 mg Oral Daily    bisacodyl  10 mg Rectal Daily    clonazePAM  0.5 mg Oral BID    QUEtiapine  100 mg Oral Nightly    traZODone  100 mg Oral Nightly    tamsulosin  0.4 mg Oral Daily    metoclopramide  10 mg Intravenous Q6H    sodium chloride flush  10 mL Intravenous 2 times per day    amitriptyline  25 mg Oral TID    busPIRone  20 mg Oral TID    escitalopram  20 mg Oral Daily    gabapentin  300 mg Oral TID       Social History:     Social History     Socioeconomic History    Marital status:       Spouse name: Not on file    Number of children: 2    Years of education: Not on file    Highest education level: Not on file   Occupational History    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   Social Needs    Financial resource strain: Not on file    Food insecurity     Worry: Not on file     Inability: Not on file   Cryptopay needs or dysuria. No hematuria. No suprapubic or CVA pain  Musculoskeletal: No muscle aches or pains. No joint effusions, swelling or deformities  Hematologic: No bleeding or bruising. Neurologic: No headache, weakness, numbness, or tingling. Integument: No rash, no ulcers. Psychiatric: No depression. Endocrine: No polyuria, no polydipsia, no polyphagia. Physical Examination :     Patient Vitals for the past 8 hrs:   BP Temp Temp src Pulse Resp SpO2 Weight   05/28/20 0751 -- -- -- -- 16 95 % --   05/28/20 0740 (!) 111/50 98.9 °F (37.2 °C) Oral 84 22 99 % --   05/28/20 0547 -- -- -- -- -- -- 197 lb 3.2 oz (89.4 kg)     General Appearance: Awake, alert, and in no apparent distress  Head:  Normocephalic, no trauma  Eyes: Pupils equal, round, reactive to light and accommodation; extraocular movements intact; sclera anicteric; conjunctivae pink. No embolic phenomena. ENT: Oropharynx clear, without erythema, exudate, or thrush. No tenderness of sinuses. Mouth/throat: mucosa pink and moist. No lesions. Dentition in good repair. Neck:Supple, without lymphadenopathy. Thyroid normal, No bruits. Tracheostomy in place. Pulmonary/Chest: Clear to auscultation, without wheezes, rales, or rhonchi. No dullness to percussion. Cardiovascular: Regular rate and rhythm without murmurs, rubs, or gallops. Abdomen: Soft, non tender. Bowel sounds normal. No organomegaly. PEG in place. Surgical incisions without inflammation. All four Extremities: No cyanosis, clubbing, edema, or effusions. Neurologic: No gross sensory or motor deficits. Skin: Warm and dry with good turgor. No signs of peripheral arterial or venous insufficiency. No ulcerations.  No open wounds.         Medical Decision Making -Laboratory:   I have independently reviewed/ordered the following labs:    CBC with Differential:   Recent Labs     05/27/20  0459 05/28/20  0642   WBC 6.1 6.0   HGB 8.3* 7.9*   HCT 28.3* 27.7*    249   LYMPHOPCT 17* 18*   MONOPCT 12 10

## 2020-05-28 NOTE — PROGRESS NOTES
Surgery Progress Note            PATIENT NAME: Ge Reno     TODAY'S DATE: 5/28/2020, 5:07 AM    SUBJECTIVE:    Pt seen and examined. Afebrile overnight. Tolerated TF. No emesis. Continues to be anxious but RN reports she slept well overnight. UOP adequate. + BM. OBJECTIVE:   VITALS:  BP (!) 95/52   Pulse 85   Temp 98.5 °F (36.9 °C) (Oral)   Resp 18   Ht 5' 3\" (1.6 m)   Wt 195 lb 3 oz (88.5 kg)   SpO2 93%   BMI 34.58 kg/m²      INTAKE/OUTPUT:      Intake/Output Summary (Last 24 hours) at 5/28/2020 0507  Last data filed at 5/28/2020 0450  Gross per 24 hour   Intake 2097 ml   Output 4050 ml   Net -1953 ml       PHYSICAL EXAM  General Appearance: in no acute distress  Skin:  Warm and dry   Head/face:  NCAT and sclera anicteric   Lungs:  Respirations even and unlabored  Heart: RRR  Abdomen:  Softly distended, No rebound or guarding. Mild tenderness at umbilicus. Incisions c/d/i, G tube intact and site clean and dry.    Extremities: no edema and no cyanosis          Data:  CBC with Differential:    Lab Results   Component Value Date    WBC 6.1 05/27/2020    RBC 3.15 05/27/2020    HGB 8.3 05/27/2020    HCT 28.3 05/27/2020     05/27/2020    MCV 89.8 05/27/2020    MCH 26.3 05/27/2020    MCHC 29.3 05/27/2020    RDW 18.3 05/27/2020    NRBC 1 05/12/2020    LYMPHOPCT 17 05/27/2020    MONOPCT 12 05/27/2020    BASOPCT 0 05/27/2020    MONOSABS 0.73 05/27/2020    LYMPHSABS 1.01 05/27/2020    EOSABS 0.20 05/27/2020    BASOSABS <0.03 05/27/2020    DIFFTYPE NOT REPORTED 05/27/2020     BMP:    Lab Results   Component Value Date     05/27/2020    K 4.5 05/27/2020     05/27/2020    CO2 26 05/27/2020    BUN 17 05/27/2020    LABALBU 2.8 05/23/2020    CREATININE 0.65 05/27/2020    CALCIUM 8.9 05/27/2020    GFRAA >60 05/27/2020    LABGLOM >60 05/27/2020    GLUCOSE 113 05/27/2020       Radiology Review:    EXAMINATION:   RIGHT UPPER QUADRANT ULTRASOUND       5/16/2020 12:45 pm       COMPARISON:   None.

## 2020-05-28 NOTE — PROGRESS NOTES
ordered by ID.     Anxiety continues overnight  VSS- modified barium   COVID swab  esogram possible if warranted by VSS; and possible peg tube eval  Fevers overnight prompting blood cultures x2 urinalysis with culture all pending at current time  Passed swallow study  UGI series show gastric outlet obstruction,GI consult added by surgery    Review of Systems:     Constitutional:  negative for chills, fevers, sweats  Respiratory:  negative for cough, dyspnea on exertion, shortness of breath, wheezing  Cardiovascular:  negative for chest pain, chest pressure/discomfort, lower extremity edema, palpitations  Gastrointestinal:  negative for abdominal pain, constipation, diarrhea, nausea, vomiting  Neurological:  negative for dizziness, headache    Medications: Allergies:     Allergies   Allergen Reactions    Prednisone Anxiety    Compazine [Prochlorperazine Maleate]     Penicillin V Potassium     Penicillins Other (See Comments)     shock       Current Meds:   Scheduled Meds:    meropenem  1 g Intravenous Q8H    sodium chloride flush  10 mL Intravenous 2 times per day    sodium chloride flush  10 mL Intravenous 2 times per day    enoxaparin  40 mg Subcutaneous Daily    pantoprazole  40 mg Intravenous BID    And    sodium chloride (PF)  10 mL Intravenous BID    sucralfate  1 g Oral 4 times per day    metoprolol succinate  25 mg Oral Daily    furosemide  20 mg Oral Daily    bisacodyl  10 mg Rectal Daily    clonazePAM  0.5 mg Oral BID    QUEtiapine  100 mg Oral Nightly    traZODone  100 mg Oral Nightly    tamsulosin  0.4 mg Oral Daily    metoclopramide  10 mg Intravenous Q6H    sodium chloride flush  10 mL Intravenous 2 times per day    amitriptyline  25 mg Oral TID    busPIRone  20 mg Oral TID    escitalopram  20 mg Oral Daily    gabapentin  300 mg Oral TID     Continuous Infusions:    PN-Adult 2-in-1 Central Line (Standard) 60 mL/hr at 05/27/20 1743    dextrose 5 % and 0.45 % NaCl 75 mL/hr at tolerating TF, held again , UGI FT ordered this am by surgery    Status post cholecystectomy per surgery on 5/22     Dilated CBD on MRCP : S/P ERCP 5/21 with sphincterotomy, balloon sweeping and stent placement for CBD distal stricture    Severe esophagitis/gastritis and stenotic pylorus on EGD: S/p balloon dilatation    Recurrent UTI: No evidence of current UTI    At risk of malnutrition: Continue TPN for now along with dysphagia diet   She is still with poor tolerance     Fever :  Source unclear yet, given the diarrhea ID added C diff.  Also I will repeat CXR      Will continue with the same  Fluid rate      Chronic respiratory failure with hypoxia/tracheostomy in place: Trach care per respiratory therapy, continue supplemental oxygen.     Urinary retention: Patient refusing intermittent cath, urology consulted recommends to keep the [place] in, Flomax added     COPD: Continue inhalers      History of bipolar/anxiety: Continue psych medications, continue Ativan for anxiety     HTN: continue home medications     HPL: continue home medications     DM2: Continue diabetes home medications , insulin sliding scale, hypoglycemia protocol     DVT prophylaxis     Discussed with the patient and the nurse           Adriana Parker MD  5/28/2020  7:54 AM

## 2020-05-29 LAB
ABSOLUTE EOS #: 0.19 K/UL (ref 0–0.4)
ABSOLUTE IMMATURE GRANULOCYTE: 0.25 K/UL (ref 0–0.3)
ABSOLUTE LYMPH #: 1.3 K/UL (ref 1–4.8)
ABSOLUTE MONO #: 0.5 K/UL (ref 0.1–0.8)
ANION GAP SERPL CALCULATED.3IONS-SCNC: 11 MMOL/L (ref 9–17)
BASOPHILS # BLD: 1 % (ref 0–2)
BASOPHILS ABSOLUTE: 0.06 K/UL (ref 0–0.2)
BUN BLDV-MCNC: 16 MG/DL (ref 8–23)
BUN/CREAT BLD: ABNORMAL (ref 9–20)
CALCIUM SERPL-MCNC: 8.8 MG/DL (ref 8.6–10.4)
CHLORIDE BLD-SCNC: 98 MMOL/L (ref 98–107)
CO2: 23 MMOL/L (ref 20–31)
CREAT SERPL-MCNC: 0.65 MG/DL (ref 0.5–0.9)
CULTURE: NORMAL
CULTURE: NORMAL
DIFFERENTIAL TYPE: ABNORMAL
EOSINOPHILS RELATIVE PERCENT: 3 % (ref 1–4)
GFR AFRICAN AMERICAN: >60 ML/MIN
GFR NON-AFRICAN AMERICAN: >60 ML/MIN
GFR SERPL CREATININE-BSD FRML MDRD: ABNORMAL ML/MIN/{1.73_M2}
GFR SERPL CREATININE-BSD FRML MDRD: ABNORMAL ML/MIN/{1.73_M2}
GLUCOSE BLD-MCNC: 109 MG/DL (ref 70–99)
GLUCOSE BLD-MCNC: 110 MG/DL (ref 65–105)
GLUCOSE BLD-MCNC: 113 MG/DL (ref 65–105)
GLUCOSE BLD-MCNC: 120 MG/DL (ref 65–105)
HCT VFR BLD CALC: 25.9 % (ref 36.3–47.1)
HEMOGLOBIN: 7.7 G/DL (ref 11.9–15.1)
IMMATURE GRANULOCYTES: 4 %
LYMPHOCYTES # BLD: 21 % (ref 24–44)
Lab: NORMAL
Lab: NORMAL
MAGNESIUM: 1.9 MG/DL (ref 1.6–2.6)
MCH RBC QN AUTO: 26.3 PG (ref 25.2–33.5)
MCHC RBC AUTO-ENTMCNC: 29.7 G/DL (ref 28.4–34.8)
MCV RBC AUTO: 88.4 FL (ref 82.6–102.9)
MONOCYTES # BLD: 8 % (ref 1–7)
MORPHOLOGY: ABNORMAL
NRBC AUTOMATED: 0 PER 100 WBC
PDW BLD-RTO: 18.5 % (ref 11.8–14.4)
PHOSPHORUS: 3.2 MG/DL (ref 2.6–4.5)
PLATELET # BLD: 283 K/UL (ref 138–453)
PLATELET ESTIMATE: ABNORMAL
PMV BLD AUTO: 11.4 FL (ref 8.1–13.5)
POTASSIUM SERPL-SCNC: 4.5 MMOL/L (ref 3.7–5.3)
PROCALCITONIN: 0.27 NG/ML
RBC # BLD: 2.93 M/UL (ref 3.95–5.11)
RBC # BLD: ABNORMAL 10*6/UL
SEG NEUTROPHILS: 63 % (ref 36–66)
SEGMENTED NEUTROPHILS ABSOLUTE COUNT: 3.9 K/UL (ref 1.8–7.7)
SODIUM BLD-SCNC: 132 MMOL/L (ref 135–144)
SPECIMEN DESCRIPTION: NORMAL
SPECIMEN DESCRIPTION: NORMAL
WBC # BLD: 6.2 K/UL (ref 3.5–11.3)
WBC # BLD: ABNORMAL 10*3/UL

## 2020-05-29 PROCEDURE — 6370000000 HC RX 637 (ALT 250 FOR IP): Performed by: SURGERY

## 2020-05-29 PROCEDURE — 2500000003 HC RX 250 WO HCPCS: Performed by: FAMILY MEDICINE

## 2020-05-29 PROCEDURE — 99239 HOSP IP/OBS DSCHRG MGMT >30: CPT | Performed by: FAMILY MEDICINE

## 2020-05-29 PROCEDURE — 6360000002 HC RX W HCPCS: Performed by: INTERNAL MEDICINE

## 2020-05-29 PROCEDURE — 2700000000 HC OXYGEN THERAPY PER DAY

## 2020-05-29 PROCEDURE — 36415 COLL VENOUS BLD VENIPUNCTURE: CPT

## 2020-05-29 PROCEDURE — 82947 ASSAY GLUCOSE BLOOD QUANT: CPT

## 2020-05-29 PROCEDURE — 85025 COMPLETE CBC W/AUTO DIFF WBC: CPT

## 2020-05-29 PROCEDURE — 99233 SBSQ HOSP IP/OBS HIGH 50: CPT | Performed by: INTERNAL MEDICINE

## 2020-05-29 PROCEDURE — 6360000002 HC RX W HCPCS: Performed by: SURGERY

## 2020-05-29 PROCEDURE — 94760 N-INVAS EAR/PLS OXIMETRY 1: CPT

## 2020-05-29 PROCEDURE — 2580000003 HC RX 258: Performed by: INTERNAL MEDICINE

## 2020-05-29 PROCEDURE — 84100 ASSAY OF PHOSPHORUS: CPT

## 2020-05-29 PROCEDURE — 2580000003 HC RX 258: Performed by: SURGERY

## 2020-05-29 PROCEDURE — C9113 INJ PANTOPRAZOLE SODIUM, VIA: HCPCS | Performed by: SURGERY

## 2020-05-29 PROCEDURE — 80048 BASIC METABOLIC PNL TOTAL CA: CPT

## 2020-05-29 PROCEDURE — 84145 PROCALCITONIN (PCT): CPT

## 2020-05-29 PROCEDURE — 1200000000 HC SEMI PRIVATE

## 2020-05-29 PROCEDURE — 83735 ASSAY OF MAGNESIUM: CPT

## 2020-05-29 RX ORDER — METOPROLOL SUCCINATE 25 MG/1
25 TABLET, EXTENDED RELEASE ORAL DAILY
Qty: 30 TABLET | Refills: 3 | DISCHARGE
Start: 2020-05-30

## 2020-05-29 RX ADMIN — LORAZEPAM 1 MG: 2 INJECTION INTRAMUSCULAR at 01:35

## 2020-05-29 RX ADMIN — PANTOPRAZOLE SODIUM 40 MG: 40 INJECTION, POWDER, LYOPHILIZED, FOR SOLUTION INTRAVENOUS at 09:43

## 2020-05-29 RX ADMIN — GABAPENTIN 300 MG: 300 CAPSULE ORAL at 09:42

## 2020-05-29 RX ADMIN — MEROPENEM 1 G: 1 INJECTION, POWDER, FOR SOLUTION INTRAVENOUS at 10:11

## 2020-05-29 RX ADMIN — SUCRALFATE 1 G: 1 TABLET ORAL at 23:58

## 2020-05-29 RX ADMIN — GABAPENTIN 300 MG: 300 CAPSULE ORAL at 22:28

## 2020-05-29 RX ADMIN — GABAPENTIN 300 MG: 300 CAPSULE ORAL at 14:03

## 2020-05-29 RX ADMIN — AMITRIPTYLINE HYDROCHLORIDE 25 MG: 25 TABLET, FILM COATED ORAL at 22:28

## 2020-05-29 RX ADMIN — LORAZEPAM 1 MG: 2 INJECTION INTRAMUSCULAR at 10:05

## 2020-05-29 RX ADMIN — AMITRIPTYLINE HYDROCHLORIDE 25 MG: 25 TABLET, FILM COATED ORAL at 14:03

## 2020-05-29 RX ADMIN — TRAZODONE HYDROCHLORIDE 100 MG: 100 TABLET ORAL at 22:29

## 2020-05-29 RX ADMIN — SUCRALFATE 1 G: 1 TABLET ORAL at 12:01

## 2020-05-29 RX ADMIN — MEROPENEM 1 G: 1 INJECTION, POWDER, FOR SOLUTION INTRAVENOUS at 01:35

## 2020-05-29 RX ADMIN — OXYCODONE HYDROCHLORIDE AND ACETAMINOPHEN 1 TABLET: 5; 325 TABLET ORAL at 14:13

## 2020-05-29 RX ADMIN — METOCLOPRAMIDE 10 MG: 5 INJECTION, SOLUTION INTRAMUSCULAR; INTRAVENOUS at 16:58

## 2020-05-29 RX ADMIN — METOCLOPRAMIDE 10 MG: 5 INJECTION, SOLUTION INTRAMUSCULAR; INTRAVENOUS at 09:43

## 2020-05-29 RX ADMIN — PANTOPRAZOLE SODIUM 40 MG: 40 INJECTION, POWDER, LYOPHILIZED, FOR SOLUTION INTRAVENOUS at 22:28

## 2020-05-29 RX ADMIN — ENOXAPARIN SODIUM 40 MG: 40 INJECTION SUBCUTANEOUS at 10:04

## 2020-05-29 RX ADMIN — TAMSULOSIN HYDROCHLORIDE 0.4 MG: 0.4 CAPSULE ORAL at 09:42

## 2020-05-29 RX ADMIN — BUSPIRONE HYDROCHLORIDE 20 MG: 10 TABLET ORAL at 22:28

## 2020-05-29 RX ADMIN — Medication 10 ML: at 22:28

## 2020-05-29 RX ADMIN — CLONAZEPAM 0.5 MG: 0.5 TABLET ORAL at 10:04

## 2020-05-29 RX ADMIN — CLONAZEPAM 0.5 MG: 0.5 TABLET ORAL at 22:28

## 2020-05-29 RX ADMIN — BUSPIRONE HYDROCHLORIDE 20 MG: 10 TABLET ORAL at 09:42

## 2020-05-29 RX ADMIN — AMITRIPTYLINE HYDROCHLORIDE 25 MG: 25 TABLET, FILM COATED ORAL at 09:42

## 2020-05-29 RX ADMIN — SUCRALFATE 1 G: 1 TABLET ORAL at 18:40

## 2020-05-29 RX ADMIN — SUCRALFATE 1 G: 1 TABLET ORAL at 09:42

## 2020-05-29 RX ADMIN — BUSPIRONE HYDROCHLORIDE 20 MG: 10 TABLET ORAL at 14:03

## 2020-05-29 RX ADMIN — Medication 10 ML: at 10:06

## 2020-05-29 RX ADMIN — QUETIAPINE FUMARATE 100 MG: 100 TABLET ORAL at 22:28

## 2020-05-29 RX ADMIN — ESCITALOPRAM OXALATE 20 MG: 10 TABLET ORAL at 09:42

## 2020-05-29 RX ADMIN — SUCRALFATE 1 G: 1 TABLET ORAL at 01:35

## 2020-05-29 RX ADMIN — METOCLOPRAMIDE 10 MG: 5 INJECTION, SOLUTION INTRAMUSCULAR; INTRAVENOUS at 22:29

## 2020-05-29 RX ADMIN — LORAZEPAM 1 MG: 2 INJECTION INTRAMUSCULAR at 14:03

## 2020-05-29 RX ADMIN — LORAZEPAM 1 MG: 2 INJECTION INTRAMUSCULAR at 18:32

## 2020-05-29 RX ADMIN — METOPROLOL SUCCINATE 25 MG: 25 TABLET, FILM COATED, EXTENDED RELEASE ORAL at 09:42

## 2020-05-29 RX ADMIN — METOCLOPRAMIDE 10 MG: 5 INJECTION, SOLUTION INTRAMUSCULAR; INTRAVENOUS at 04:42

## 2020-05-29 RX ADMIN — ACETAMINOPHEN 650 MG: 325 TABLET ORAL at 04:42

## 2020-05-29 RX ADMIN — POTASSIUM CHLORIDE: 2 INJECTION, SOLUTION, CONCENTRATE INTRAVENOUS at 18:24

## 2020-05-29 RX ADMIN — OXYCODONE HYDROCHLORIDE AND ACETAMINOPHEN 1 TABLET: 5; 325 TABLET ORAL at 18:40

## 2020-05-29 ASSESSMENT — PAIN SCALES - GENERAL
PAINLEVEL_OUTOF10: 8
PAINLEVEL_OUTOF10: 8
PAINLEVEL_OUTOF10: 7

## 2020-05-29 NOTE — PROGRESS NOTES
Nutrition Assessment (Parenteral Nutrition)    Type and Reason for Visit: Reassess    Nutrition Recommendations: Recommend continue 325g Dextrose, 75 g AA at 60 ml per hour + Vital AF at 10 ml per hour and MARTI. This will provide 1693 kcal, 93 g protein (not including po) meeting nutrition needs. Recommend increase in Na to next bag of PN. Nutrition Assessment: Plan is for discharge to rehab on PN and trickle tube feed (po as tolerated). Malnutrition Assessment:  · Malnutrition Status: At risk for malnutrition  · Context: Acute illness or injury  · Findings of the 6 clinical characteristics of malnutrition (Minimum of 2 out of 6 clinical characteristics is required to make the diagnosis of moderate or severe Protein Calorie Malnutrition based on AND/ASPEN Guidelines):  1. Energy Intake-Greater than 75% of estimated energy requirement, Greater than or equal to 1 month(w/ TPN)    2. Weight Loss-No significant weight loss,    3. Fat Loss-No significant subcutaneous fat loss,    4. Muscle Loss-No significant muscle mass loss,    5. Fluid Accumulation-Moderate to severe fluid accumulation, Extremities  6.   Strength-Not measured    Nutrition Risk Level: High    Nutrient Needs:  · Estimated Daily Total Kcal: 1.2-1.3 ~> 2781-5551 kcals/d   · Estimated Daily Protein (g): 1.2-1.4 gm/kg ~> 60-75 gms/d   Nutrition Diagnosis:   · Problem: Inadequate oral intake  · Etiology: related to Alteration in GI function     Signs and symptoms:  as evidenced by Intake 0-25%, Nutrition support - PN, Nutrition support - EN    Objective Information:  · Nutrition-Focused Physical Findings: Labs/Meds Reviewed,  · Wound Type: Multiple, Open Wounds  · Current Nutrition Therapies:  · Oral Diet Orders: Dysphagia Pureed (Dysphagia 1), Mildly Thick   · Oral Diet intake: 1-25%  · Oral Nutrition Supplement (ONS) Orders: None  · ONS intake: (D/C ONS as not appropriate liquid consistency)  · Parenteral Nutrition Orders:  · Type and

## 2020-05-29 NOTE — CARE COORDINATION
Spoke with Jasmina at [facility]. [place], admissions, states that they can take the patient if ID is willing to discharge and follow over to [facility].

## 2020-05-29 NOTE — PROGRESS NOTES
mild gastritis with no active bleeding. · After the procedure she developed acute respiratory distress and a fever of 39.8. She was emergently intubated and started on Meropenem and Vancomycin. · Patient improved with treatment. · Transferred to [facility] on 4-6-20   · [facility] on 5-12-20 because of persistent vomiting, abdominal pain. Concern for possible SBO. · Found to have Severe esophagitis, nodular gastritis, pyloric stenosis by EGD on 5-18-20. S/P pyloric balloon dilatation. · Underwent cholecystectomy and pyloroplasty 5-22-20  · On meropenem perioperatively. · Running low grade fevers. RUL infiltrate improved. Possible effusion/consolidation LLL     Infection Control Recommendations   · [place] Precautions    Antimicrobial Stewardship Recommendations     · Discontinuation of therapy  Coordination of Outpatient Care:     · Estimated Length of IV antimicrobials:None  · Patient will need Midline Catheter Insertion: No  · Patient will need PICC line Insertion:No  · Patient will need: Home IV , [facility],  SNF: TBD  · Patient will need outpatient wound care:Yes  Chief complaint/reason for consultation:   · Intraabdominal infection  · Pneumonia    History of Present Illness:   Liu Frederick is a 76y.o.-year-old  female who was initially admitted on 5/12/2020. Patient seen at the request of Rodo Espino. INITIAL EVALUATION:     Patient known to my service from prior evaluation at [facility].     Patient has a history of severe GERD despite being on PPI, H2 blocker and Tums. She underwent laparoscopic robotic hiatal hernia repair and fundoplication last month on 02/24/2020 and postoperatively she developed hypoxia. CT chest done on 02/26/2020 showed bibasilar atelectasis/consolidation and patchy area of infiltrate in the right upper lobe.  Patient was discharged on home oxygen on 2-28.     [facility] with worsening shortness of breath, dyspnea as day    Carpal tunnel syndrome     Cataracts, bilateral     Constipation     Depression     Diarrhea     GERD (gastroesophageal reflux disease)     H/O gastric ulcer     Hyperlipidemia     Hypertension     Mumps     MVP (mitral valve prolapse)     Dr. Nancy Amezcua in May 2019    Recurrent UTI     Dr. Joselin Shrestha    Sinusitis     Tracheostomy in place [facility])     Ulcerative esophagitis     Wellness examination     Dr. Sj Naylor seen in Jan 2020       Past Surgical  History:     Past Surgical History:   Procedure Laterality Date    APPENDECTOMY     [address], LAPAROSCOPIC  05/22/2020     LAPAROSCOPIC ROBOTIC CHOLECYSTECTOMY, PYLOROPLASTY     CHOLECYSTECTOMY, LAPAROSCOPIC N/A 5/22/2020    XI LAPAROSCOPIC ROBOTIC CHOLECYSTECTOMY, PYLOROPLASTY performed by Kolton Treadwell MD at [address]      EGD  3/17/2020         ERCP  05/21/2020    with stent insertion, balloon dilation, sphinctereotomy    ERCP  5/21/2020    ** CASE IN OR WITY GI STAFF** ERCP STENT INSERTION performed by Lennox Mccall MD at [facility]    ERCP  5/21/2020    ** CASE IN OR WITH GI STAFF**ERCP SPHINCTER/PAPILLOTOMY performed by Lennox Mccall MD at [facility]    ERCP  5/21/2020    ** CASE IN OR WITH GI STAFF**ERCP DILATION BALLOON performed by Lennox Mccall MD at [address]      patricia IOL    GASTRIC FUNDOPLICATION N/A 9/87/1232    XI LAPAROSCOPIC ROBOTIC HIATAL HERNIA REPAIR, CHERYL FUNDOPLICATION performed by Kolton Treadwell MD at [facility] N/A 3/27/2020    EGD PEG TUBE PLACEMENT performed by Kolton Treadwell MD at [address] [facility] CATH POWER PICC TRIPLE  3/4/2020         HYSTERECTOMY      Abdominal    JOINT REPLACEMENT Right     right hip    OVARY REMOVAL      RHINOPLASTY      TONSILLECTOMY      TOTAL HIP ARTHROPLASTY      TOTAL NEPHRECTOMY      atrophic from infections, age 13   [facility] A TRACHEOSTOMY N/A 3/27/2020    **ADD ON **WANTS 10:00AM **TRACHEOTOMY performed by Lesly Greene MD at [address] N/A 4/2/2020    TRACHEOTOMY EXCHANGE performed by Lesly Greene MD at [address] [facility] ENDOSCOPY N/A 3/17/2020    BEDSIDE EGD ESOPHAGOGASTRODUODENOSCOPY (ICU) performed by Lesly Greene MD at [facility]    UPPER GASTROINTESTINAL ENDOSCOPY N/A 5/18/2020    EGD BIOPSY performed by Paulina Davis MD at [address] [place] [address]  5/18/2020    EGD DILATION BALLOON performed by Paulina Davis MD at [facility]       Medications:      meropenem  1 g Intravenous Q8H    sodium chloride flush  10 mL Intravenous 2 times per day    sodium chloride flush  10 mL Intravenous 2 times per day    enoxaparin  40 mg Subcutaneous Daily    pantoprazole  40 mg Intravenous BID    And    sodium chloride (PF)  10 mL Intravenous BID    sucralfate  1 g Oral 4 times per day    metoprolol succinate  25 mg Oral Daily    [Held by provider] furosemide  20 mg Oral Daily    bisacodyl  10 mg Rectal Daily    clonazePAM  0.5 mg Oral BID    QUEtiapine  100 mg Oral Nightly    traZODone  100 mg Oral Nightly    tamsulosin  0.4 mg Oral Daily    metoclopramide  10 mg Intravenous Q6H    sodium chloride flush  10 mL Intravenous 2 times per day    amitriptyline  25 mg Oral TID    busPIRone  20 mg Oral TID    escitalopram  20 mg Oral Daily    gabapentin  300 mg Oral TID       Social History:     Social History     Socioeconomic History    Marital status:       Spouse name: Not on file    Number of children: 2    Years of education: Not on file    Highest education level: Not on file   Occupational History    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   Social Needs    Financial resource strain: Not on file    Food insecurity     Worry: Not on file     Inability: Not on file   [employer] needs     Medical: Not on file     Non-medical: Not on file Tobacco Use    Smoking status: Current Every Day Smoker     Packs/day: 1.00     Years: 50.00     Pack years: 50.00     Types: Cigarettes    Smokeless tobacco: Never Used   Substance and Sexual Activity    Alcohol use: No    Drug use: No    Sexual activity: Never   Lifestyle    Physical activity     Days per week: Not on file     Minutes per session: Not on file    Stress: Not on file   Relationships    Social connections     Talks on phone: Not on file     Gets together: Not on file     Attends Jewish service: Not on file     Active member of club or organization: Not on file     Attends meetings of clubs or organizations: Not on file     Relationship status: Not on file    Intimate partner violence     Fear of current or ex partner: Not on file     Emotionally abused: Not on file     Physically abused: Not on file     Forced sexual activity: Not on file   Other Topics Concern    Not on file   Social History Narrative    Not on file       Family History:     Family History   Problem Relation Age of Onset    Cancer Mother         uterine    Heart Attack Mother     Heart Attack Father     Other Maternal Grandfather         foot gangrene    Other Paternal Grandmother         bleeding ulcers    Other Paternal Grandfather         lung cancer        Allergies:   Prednisone; Compazine [prochlorperazine maleate]; Penicillin v potassium; and Penicillins     Review of Systems:   Constitutional: No fevers or chills. No systemic complaints  Head: No headaches  Eyes: No double vision or blurry vision. No conjunctival inflammation. ENT: No sore throat or runny nose. . No hearing loss, tinnitus or vertigo. Cardiovascular: No chest pain or palpitations. No shortness of breath. No [place]  Lung: No shortness of breath or cough. No sputum production  Abdomen: No nausea, vomiting, diarrhea, or abdominal pain. [place] No cramps. Genitourinary: No increased urinary frequency, or dysuria. No hematuria.  No suprapubic or CVA pain  Musculoskeletal: No muscle aches or pains. No joint effusions, swelling or deformities  Hematologic: No bleeding or bruising. Neurologic: No headache, weakness, numbness, or tingling. Integument: No rash, no ulcers. Psychiatric: No depression. Endocrine: No polyuria, no polydipsia, no polyphagia. Physical Examination :     Patient Vitals for the past 8 hrs:   BP Temp Temp src Pulse Resp SpO2   05/29/20 1255 -- -- -- -- 20 96 %   05/29/20 1226 130/67 97.8 °F (36.6 °C) Oral 82 18 100 %   05/29/20 1003 -- -- -- -- 24 --   05/29/20 0854 (!) 104/50 98.7 °F (37.1 °C) Oral 79 23 99 %     General Appearance: Awake, alert, and in no apparent distress  Head:  Normocephalic, no trauma  Eyes: Pupils equal, round, reactive to light and accommodation; extraocular movements intact; sclera anicteric; conjunctivae pink. No embolic phenomena. ENT: Oropharynx clear, without erythema, exudate, or thrush. No tenderness of sinuses. Mouth/throat: mucosa pink and moist. No lesions. Dentition in good repair. Neck:Supple, without lymphadenopathy. Thyroid normal, No bruits. Tracheostomy in place. Pulmonary/Chest: Clear to auscultation, without wheezes, rales, or rhonchi. No dullness to percussion. Cardiovascular: Regular rate and rhythm without murmurs, rubs, or gallops. Abdomen: Soft, non tender. Bowel sounds normal. No organomegaly. PEG in place. Surgical incisions without inflammation. All four Extremities: No cyanosis, clubbing, edema, or effusions. Neurologic: No gross sensory or motor deficits. Skin: Warm and dry with good turgor. No signs of peripheral arterial or venous insufficiency. No ulcerations.  No open wounds.         Medical Decision Making -Laboratory:   I have independently reviewed/ordered the following labs:    CBC with Differential:   Recent Labs     05/28/20  0642 05/29/20  0458   WBC 6.0 6.2   HGB 7.9* 7.7*   HCT 27.7* 25.9*    283   LYMPHOPCT 18* 21*   MONOPCT 10 8*     BMP:   Recent Labs

## 2020-05-29 NOTE — PROGRESS NOTES
of Zyvox ordered by ID.     Anxiety continues overnight  VSS- modified barium   COVID swab  esogram possible if warranted by VSS; and possible peg tube eval  Fevers overnight prompting blood cultures x2 urinalysis with culture all pending at current time  Passed swallow study  UGI series show gastric outlet obstruction,GI consult added by surgery    Review of Systems:     Constitutional:  negative for chills, fevers, sweats  Respiratory:  negative for cough, dyspnea on exertion, shortness of breath, wheezing  Cardiovascular:  negative for chest pain, chest pressure/discomfort, lower extremity edema, palpitations  Gastrointestinal:  negative for abdominal pain, constipation, diarrhea, nausea, vomiting  Neurological:  negative for dizziness, headache    Medications: Allergies:     Allergies   Allergen Reactions    Prednisone Anxiety    Compazine [Prochlorperazine Maleate]     Penicillin V Potassium     Penicillins Other (See Comments)     shock       Current Meds:   Scheduled Meds:    meropenem  1 g Intravenous Q8H    sodium chloride flush  10 mL Intravenous 2 times per day    sodium chloride flush  10 mL Intravenous 2 times per day    enoxaparin  40 mg Subcutaneous Daily    pantoprazole  40 mg Intravenous BID    And    sodium chloride (PF)  10 mL Intravenous BID    sucralfate  1 g Oral 4 times per day    metoprolol succinate  25 mg Oral Daily    [Held by provider] furosemide  20 mg Oral Daily    bisacodyl  10 mg Rectal Daily    clonazePAM  0.5 mg Oral BID    QUEtiapine  100 mg Oral Nightly    traZODone  100 mg Oral Nightly    tamsulosin  0.4 mg Oral Daily    metoclopramide  10 mg Intravenous Q6H    sodium chloride flush  10 mL Intravenous 2 times per day    amitriptyline  25 mg Oral TID    busPIRone  20 mg Oral TID    escitalopram  20 mg Oral Daily    gabapentin  300 mg Oral TID     Continuous Infusions:    PN-Adult 2-in-1 Central Line (Standard) 60 mL/hr at 05/28/20 1736    dextrose 5 % and 0.45 % NaCl 75 mL/hr at 20 1707     PRN Meds: ipratropium-albuterol, acetaminophen, sodium chloride flush, fentanNYL, midazolam, sodium chloride flush, ondansetron, fentanNYL, fentanNYL, fentanNYL, fentanNYL, oxyCODONE-acetaminophen, labetalol, hydrALAZINE, LORazepam, acetaminophen, sodium chloride flush, potassium chloride **OR** potassium alternative oral replacement **OR** potassium chloride, magnesium sulfate, nicotine    Data:     Past Medical History:   has a past medical history of Anxiety, Arthritis, Back pain, Bronchitis, Caffeine use, Carpal tunnel syndrome, Cataracts, bilateral, Constipation, Depression, Diarrhea, GERD (gastroesophageal reflux disease), H/O gastric ulcer, Hyperlipidemia, Hypertension, Mumps, MVP (mitral valve prolapse), Recurrent UTI, Sinusitis, Tracheostomy in place [facility]), Ulcerative esophagitis, and Wellness examination. Social History:   reports that she has been smoking cigarettes. She has a 50.00 pack-year smoking history. She has never used smokeless tobacco. She reports that she does not drink alcohol or use drugs. Family History:   Family History   Problem Relation Age of Onset   Elena Sosaer Cancer Mother         uterine    Heart Attack Mother     Heart Attack Father     Other Maternal Grandfather         foot gangrene    Other Paternal Grandmother         bleeding ulcers    Other Paternal Grandfather         lung cancer       Vitals:  BP (!) 115/55   Pulse 88   Temp 99.5 °F (37.5 °C) (Axillary)   Resp 20   Ht 5' 3\" (1.6 m)   Wt 201 lb 6.4 oz (91.4 kg)   SpO2 94%   BMI 35.68 kg/m²   Temp (24hrs), Av.2 °F (37.9 °C), Min:99.5 °F (37.5 °C), Max:101.5 °F (38.6 °C)    Recent Labs     20  0020 20  1137 20  0719   POCGLU 111* 113* 139* 120*       I/O (24Hr):     Intake/Output Summary (Last 24 hours) at 2020 0810  Last data filed at 2020 0441  Gross per 24 hour   Intake 2214 ml   Output 1975 ml   Net 239 ml interstitial opacities remain in place. Retrocardiac opacity. Possible pneumonia with or without pleural effusion     Xr Chest Portable    Result Date: 5/24/2020  Left lower lobe opacification concerning for infiltrate and pneumonia. Continued interval clearing of right upper lobe opacification.        Physical Examination:        General appearance:  alert, cooperative,looks anxious   Trach noted  Mental Status:  oriented to person, place and time and normal affect  Lungs:  diminished bilaterally, normal effort  Heart:  regular rate and rhythm, no murmur  Abdomen: Peg tube noted,  soft, nontender, nondistended, normal bowel sounds, no masses, hepatomegaly, splenomegaly  Extremities:  no edema, redness, tenderness in the calves  Skin:  no gross lesions, rashes, induration    Assessment:        Hospital Problems           Last Modified POA    * (Principal) Gastric outlet obstruction 5/16/2020 Yes    MVP (mitral valve prolapse) 5/12/2020 Yes    Dysphagia 5/22/2020 Yes    Centrilobular emphysema ([address] 5/12/2020 Yes    Fever 5/14/2020 Yes    Recurrent UTI 5/12/2020 Yes    Overview Signed 5/12/2020  5:22 PM by MD Dr. Yahir Kaye         Tracheostomy in place [facility]) 5/12/2020 Yes    H/O gastric ulcer 5/12/2020 Yes    Hyperlipidemia 5/12/2020 Yes    Hypertension 5/12/2020 Yes    Tobacco abuse 5/12/2020 Yes    Chronic respiratory failure with hypoxia ([address] 5/12/2020 Yes    Status post insertion of percutaneous endoscopic gastrostomy (PEG) tube ([address] 5/12/2020 Yes    S/P Nissen fundoplication (without gastrostomy tube) procedure 5/12/2020 Yes    Paralytic ileus ([address] 5/17/2020 Yes    Elevated liver enzymes 5/17/2020 Yes          Plan:        S/p hiatal hernia repair with Mark  fundoplication ( early in 2/1614)       Gastric outlet obstruction/ pylorus paralysis: S/P pylorotomy on 5/22, still not tolerating TF, held again , UGI FT ordered this am by surgery    Status post cholecystectomy per surgery on 5/22     Dilated CBD on MRCP : S/P ERCP 5/21 with sphincterotomy, balloon sweeping and stent placement for CBD distal stricture    Severe esophagitis/gastritis and stenotic pylorus on EGD: S/p balloon dilatation    Recurrent UTI: No evidence of current UTI    At risk of malnutrition: Continue TPN for now along with dysphagia diet   She is still with poor tolerance     Fever :  Source unclear yet, given the diarrhea ID added C diff.  Also I will repeat CXR      Will continue with the same  Fluid rate      Chronic respiratory failure with hypoxia/tracheostomy in place: Trach care per respiratory therapy, continue supplemental oxygen.     Urinary retention: Patient refusing intermittent cath, urology consulted recommends to keep the Chu in, Flomax added   WiIll be discharged with chu   I discussed with the patient and encouraged her to agree to removing it      COPD: Continue inhalers      History of bipolar/anxiety: Continue psych medications, continue Ativan for anxiety     HTN: continue home medications     HPL: continue home medications     DM2: Continue diabetes home medications , insulin sliding scale, hypoglycemia protocol     DVT prophylaxis     Discussed with the patient and the nurse     Ok to DC per ID and general surgery    Will go back to [employer] rec done  Scripts added   ZARA signed  30+ minutes spent        Arnold Lloyd MD  5/29/2020  8:10 AM

## 2020-05-29 NOTE — PROGRESS NOTES
Writer called to pt room, for suction. Checked on pt, refused suction said she was nausea and able to cough \"just fine\".

## 2020-05-30 ENCOUNTER — APPOINTMENT (OUTPATIENT)
Dept: GENERAL RADIOLOGY | Age: 75
DRG: 326 | End: 2020-05-30
Attending: FAMILY MEDICINE
Payer: MEDICARE

## 2020-05-30 LAB
ABSOLUTE EOS #: 0.16 K/UL (ref 0–0.4)
ABSOLUTE IMMATURE GRANULOCYTE: 0.22 K/UL (ref 0–0.3)
ABSOLUTE LYMPH #: 1.78 K/UL (ref 1–4.8)
ABSOLUTE MONO #: 0.22 K/UL (ref 0.1–0.8)
ALLEN TEST: POSITIVE
AMMONIA: 49 UMOL/L (ref 11–51)
ANION GAP SERPL CALCULATED.3IONS-SCNC: 12 MMOL/L (ref 9–17)
BASOPHILS # BLD: 0 % (ref 0–2)
BASOPHILS ABSOLUTE: 0 K/UL (ref 0–0.2)
BUN BLDV-MCNC: 18 MG/DL (ref 8–23)
BUN/CREAT BLD: ABNORMAL (ref 9–20)
CALCIUM SERPL-MCNC: 8.7 MG/DL (ref 8.6–10.4)
CHLORIDE BLD-SCNC: 100 MMOL/L (ref 98–107)
CO2: 22 MMOL/L (ref 20–31)
CREAT SERPL-MCNC: 0.66 MG/DL (ref 0.5–0.9)
DIFFERENTIAL TYPE: ABNORMAL
EKG ATRIAL RATE: 75 BPM
EKG P AXIS: 56 DEGREES
EKG P-R INTERVAL: 142 MS
EKG Q-T INTERVAL: 386 MS
EKG QRS DURATION: 72 MS
EKG QTC CALCULATION (BAZETT): 431 MS
EKG R AXIS: 58 DEGREES
EKG T AXIS: 31 DEGREES
EKG VENTRICULAR RATE: 75 BPM
EOSINOPHILS RELATIVE PERCENT: 3 % (ref 1–4)
FIO2: 40
GFR AFRICAN AMERICAN: >60 ML/MIN
GFR NON-AFRICAN AMERICAN: >60 ML/MIN
GFR SERPL CREATININE-BSD FRML MDRD: ABNORMAL ML/MIN/{1.73_M2}
GFR SERPL CREATININE-BSD FRML MDRD: ABNORMAL ML/MIN/{1.73_M2}
GLUCOSE BLD-MCNC: 104 MG/DL (ref 65–105)
GLUCOSE BLD-MCNC: 114 MG/DL (ref 65–105)
GLUCOSE BLD-MCNC: 117 MG/DL (ref 70–99)
GLUCOSE BLD-MCNC: 124 MG/DL (ref 65–105)
HCT VFR BLD CALC: 25.6 % (ref 36.3–47.1)
HEMOGLOBIN: 7.3 G/DL (ref 11.9–15.1)
IMMATURE GRANULOCYTES: 4 %
LACTIC ACID, WHOLE BLOOD: 1 MMOL/L (ref 0.7–2.1)
LACTIC ACID: NORMAL MMOL/L
LYMPHOCYTES # BLD: 33 % (ref 24–44)
MAGNESIUM: 2.1 MG/DL (ref 1.6–2.6)
MCH RBC QN AUTO: 26.3 PG (ref 25.2–33.5)
MCHC RBC AUTO-ENTMCNC: 28.5 G/DL (ref 28.4–34.8)
MCV RBC AUTO: 92.1 FL (ref 82.6–102.9)
MODE: ABNORMAL
MONOCYTES # BLD: 4 % (ref 1–7)
MORPHOLOGY: ABNORMAL
NEGATIVE BASE EXCESS, ART: ABNORMAL (ref 0–2)
NRBC AUTOMATED: 0 PER 100 WBC
O2 DEVICE/FLOW/%: ABNORMAL
PATIENT TEMP: ABNORMAL
PDW BLD-RTO: 18.6 % (ref 11.8–14.4)
PLATELET # BLD: 310 K/UL (ref 138–453)
PLATELET ESTIMATE: ABNORMAL
PMV BLD AUTO: 11.4 FL (ref 8.1–13.5)
POC HCO3: 28.6 MMOL/L (ref 21–28)
POC LACTIC ACID: 0.58 MMOL/L (ref 0.56–1.39)
POC O2 SATURATION: 99 % (ref 94–98)
POC PCO2 TEMP: ABNORMAL MM HG
POC PCO2: 42.3 MM HG (ref 35–48)
POC PH TEMP: ABNORMAL
POC PH: 7.44 (ref 7.35–7.45)
POC PO2 TEMP: ABNORMAL MM HG
POC PO2: 129.9 MM HG (ref 83–108)
POSITIVE BASE EXCESS, ART: 4 (ref 0–3)
POTASSIUM SERPL-SCNC: 4.4 MMOL/L (ref 3.7–5.3)
RBC # BLD: 2.78 M/UL (ref 3.95–5.11)
RBC # BLD: ABNORMAL 10*6/UL
SAMPLE SITE: ABNORMAL
SEG NEUTROPHILS: 56 % (ref 36–66)
SEGMENTED NEUTROPHILS ABSOLUTE COUNT: 3.02 K/UL (ref 1.8–7.7)
SODIUM BLD-SCNC: 134 MMOL/L (ref 135–144)
TCO2 (CALC), ART: 30 MMOL/L (ref 22–29)
TROPONIN INTERP: ABNORMAL
TROPONIN INTERP: ABNORMAL
TROPONIN T: ABNORMAL NG/ML
TROPONIN T: ABNORMAL NG/ML
TROPONIN, HIGH SENSITIVITY: 59 NG/L (ref 0–14)
TROPONIN, HIGH SENSITIVITY: 63 NG/L (ref 0–14)
WBC # BLD: 5.4 K/UL (ref 3.5–11.3)
WBC # BLD: ABNORMAL 10*3/UL

## 2020-05-30 PROCEDURE — C9113 INJ PANTOPRAZOLE SODIUM, VIA: HCPCS | Performed by: SURGERY

## 2020-05-30 PROCEDURE — 6360000002 HC RX W HCPCS: Performed by: SURGERY

## 2020-05-30 PROCEDURE — 2700000000 HC OXYGEN THERAPY PER DAY

## 2020-05-30 PROCEDURE — 6370000000 HC RX 637 (ALT 250 FOR IP): Performed by: SURGERY

## 2020-05-30 PROCEDURE — 82947 ASSAY GLUCOSE BLOOD QUANT: CPT

## 2020-05-30 PROCEDURE — 2580000003 HC RX 258: Performed by: SURGERY

## 2020-05-30 PROCEDURE — 36415 COLL VENOUS BLD VENIPUNCTURE: CPT

## 2020-05-30 PROCEDURE — 1200000000 HC SEMI PRIVATE

## 2020-05-30 PROCEDURE — 93010 ELECTROCARDIOGRAM REPORT: CPT | Performed by: INTERNAL MEDICINE

## 2020-05-30 PROCEDURE — 82803 BLOOD GASES ANY COMBINATION: CPT

## 2020-05-30 PROCEDURE — 6370000000 HC RX 637 (ALT 250 FOR IP): Performed by: NURSE PRACTITIONER

## 2020-05-30 PROCEDURE — 2580000003 HC RX 258: Performed by: FAMILY MEDICINE

## 2020-05-30 PROCEDURE — 93005 ELECTROCARDIOGRAM TRACING: CPT | Performed by: NURSE PRACTITIONER

## 2020-05-30 PROCEDURE — 82140 ASSAY OF AMMONIA: CPT

## 2020-05-30 PROCEDURE — 85025 COMPLETE CBC W/AUTO DIFF WBC: CPT

## 2020-05-30 PROCEDURE — 2500000003 HC RX 250 WO HCPCS: Performed by: FAMILY MEDICINE

## 2020-05-30 PROCEDURE — 99239 HOSP IP/OBS DSCHRG MGMT >30: CPT | Performed by: FAMILY MEDICINE

## 2020-05-30 PROCEDURE — 2580000003 HC RX 258: Performed by: NURSE PRACTITIONER

## 2020-05-30 PROCEDURE — 6370000000 HC RX 637 (ALT 250 FOR IP): Performed by: FAMILY MEDICINE

## 2020-05-30 PROCEDURE — 97530 THERAPEUTIC ACTIVITIES: CPT

## 2020-05-30 PROCEDURE — 2060000000 HC ICU INTERMEDIATE R&B

## 2020-05-30 PROCEDURE — 80048 BASIC METABOLIC PNL TOTAL CA: CPT

## 2020-05-30 PROCEDURE — 99232 SBSQ HOSP IP/OBS MODERATE 35: CPT | Performed by: INTERNAL MEDICINE

## 2020-05-30 PROCEDURE — 83735 ASSAY OF MAGNESIUM: CPT

## 2020-05-30 PROCEDURE — 97110 THERAPEUTIC EXERCISES: CPT

## 2020-05-30 PROCEDURE — 83605 ASSAY OF LACTIC ACID: CPT

## 2020-05-30 PROCEDURE — 84484 ASSAY OF TROPONIN QUANT: CPT

## 2020-05-30 PROCEDURE — 36600 WITHDRAWAL OF ARTERIAL BLOOD: CPT

## 2020-05-30 PROCEDURE — 71045 X-RAY EXAM CHEST 1 VIEW: CPT

## 2020-05-30 RX ORDER — 0.9 % SODIUM CHLORIDE 0.9 %
500 INTRAVENOUS SOLUTION INTRAVENOUS ONCE
Status: COMPLETED | OUTPATIENT
Start: 2020-05-30 | End: 2020-05-30

## 2020-05-30 RX ORDER — NOREPINEPHRINE BIT/0.9 % NACL 16MG/250ML
2 INFUSION BOTTLE (ML) INTRAVENOUS CONTINUOUS
Status: DISCONTINUED | OUTPATIENT
Start: 2020-05-30 | End: 2020-06-02 | Stop reason: HOSPADM

## 2020-05-30 RX ORDER — NALOXONE HYDROCHLORIDE 0.4 MG/ML
0.4 INJECTION, SOLUTION INTRAMUSCULAR; INTRAVENOUS; SUBCUTANEOUS PRN
Status: DISCONTINUED | OUTPATIENT
Start: 2020-05-30 | End: 2020-06-02 | Stop reason: HOSPADM

## 2020-05-30 RX ORDER — 0.9 % SODIUM CHLORIDE 0.9 %
500 INTRAVENOUS SOLUTION INTRAVENOUS ONCE
Status: DISCONTINUED | OUTPATIENT
Start: 2020-05-30 | End: 2020-06-02 | Stop reason: HOSPADM

## 2020-05-30 RX ORDER — MIDODRINE HYDROCHLORIDE 5 MG/1
10 TABLET ORAL
Status: DISCONTINUED | OUTPATIENT
Start: 2020-05-30 | End: 2020-06-02 | Stop reason: HOSPADM

## 2020-05-30 RX ORDER — 0.9 % SODIUM CHLORIDE 0.9 %
250 INTRAVENOUS SOLUTION INTRAVENOUS ONCE
Status: DISCONTINUED | OUTPATIENT
Start: 2020-05-30 | End: 2020-06-02 | Stop reason: HOSPADM

## 2020-05-30 RX ORDER — FENTANYL CITRATE 50 UG/ML
50 INJECTION, SOLUTION INTRAMUSCULAR; INTRAVENOUS
Status: COMPLETED | OUTPATIENT
Start: 2020-05-30 | End: 2020-06-01

## 2020-05-30 RX ORDER — MIDODRINE HYDROCHLORIDE 5 MG/1
5 TABLET ORAL ONCE
Status: COMPLETED | OUTPATIENT
Start: 2020-05-30 | End: 2020-05-30

## 2020-05-30 RX ORDER — 0.9 % SODIUM CHLORIDE 0.9 %
1000 INTRAVENOUS SOLUTION INTRAVENOUS ONCE
Status: COMPLETED | OUTPATIENT
Start: 2020-05-30 | End: 2020-05-30

## 2020-05-30 RX ADMIN — SODIUM CHLORIDE 500 ML: 0.9 INJECTION, SOLUTION INTRAVENOUS at 10:15

## 2020-05-30 RX ADMIN — ACETAMINOPHEN 650 MG: 325 TABLET ORAL at 13:32

## 2020-05-30 RX ADMIN — TRAZODONE HYDROCHLORIDE 100 MG: 100 TABLET ORAL at 21:44

## 2020-05-30 RX ADMIN — BUSPIRONE HYDROCHLORIDE 20 MG: 10 TABLET ORAL at 13:32

## 2020-05-30 RX ADMIN — SODIUM CHLORIDE 1000 ML: 9 INJECTION, SOLUTION INTRAVENOUS at 05:00

## 2020-05-30 RX ADMIN — METOCLOPRAMIDE 10 MG: 5 INJECTION, SOLUTION INTRAMUSCULAR; INTRAVENOUS at 10:23

## 2020-05-30 RX ADMIN — SUCRALFATE 1 G: 1 TABLET ORAL at 16:24

## 2020-05-30 RX ADMIN — AMITRIPTYLINE HYDROCHLORIDE 25 MG: 25 TABLET, FILM COATED ORAL at 13:32

## 2020-05-30 RX ADMIN — SUCRALFATE 1 G: 1 TABLET ORAL at 21:44

## 2020-05-30 RX ADMIN — GABAPENTIN 300 MG: 300 CAPSULE ORAL at 13:36

## 2020-05-30 RX ADMIN — POTASSIUM CHLORIDE: 2 INJECTION, SOLUTION, CONCENTRATE INTRAVENOUS at 18:43

## 2020-05-30 RX ADMIN — METOCLOPRAMIDE 10 MG: 5 INJECTION, SOLUTION INTRAMUSCULAR; INTRAVENOUS at 04:37

## 2020-05-30 RX ADMIN — LORAZEPAM 1 MG: 2 INJECTION INTRAMUSCULAR at 01:58

## 2020-05-30 RX ADMIN — SUCRALFATE 1 G: 1 TABLET ORAL at 10:22

## 2020-05-30 RX ADMIN — PANTOPRAZOLE SODIUM 40 MG: 40 INJECTION, POWDER, LYOPHILIZED, FOR SOLUTION INTRAVENOUS at 21:44

## 2020-05-30 RX ADMIN — MIDODRINE HYDROCHLORIDE 10 MG: 5 TABLET ORAL at 16:24

## 2020-05-30 RX ADMIN — METOCLOPRAMIDE 10 MG: 5 INJECTION, SOLUTION INTRAMUSCULAR; INTRAVENOUS at 16:24

## 2020-05-30 RX ADMIN — METOCLOPRAMIDE 10 MG: 5 INJECTION, SOLUTION INTRAMUSCULAR; INTRAVENOUS at 21:44

## 2020-05-30 RX ADMIN — Medication 10 ML: at 21:45

## 2020-05-30 RX ADMIN — Medication 10 ML: at 01:59

## 2020-05-30 RX ADMIN — QUETIAPINE FUMARATE 100 MG: 100 TABLET ORAL at 21:44

## 2020-05-30 RX ADMIN — PANTOPRAZOLE SODIUM 40 MG: 40 INJECTION, POWDER, LYOPHILIZED, FOR SOLUTION INTRAVENOUS at 09:39

## 2020-05-30 RX ADMIN — MIDODRINE HYDROCHLORIDE 5 MG: 5 TABLET ORAL at 06:46

## 2020-05-30 RX ADMIN — ESCITALOPRAM OXALATE 20 MG: 10 TABLET ORAL at 10:22

## 2020-05-30 RX ADMIN — BUSPIRONE HYDROCHLORIDE 20 MG: 10 TABLET ORAL at 10:22

## 2020-05-30 RX ADMIN — ENOXAPARIN SODIUM 40 MG: 40 INJECTION SUBCUTANEOUS at 09:40

## 2020-05-30 RX ADMIN — TAMSULOSIN HYDROCHLORIDE 0.4 MG: 0.4 CAPSULE ORAL at 10:22

## 2020-05-30 RX ADMIN — ONDANSETRON 4 MG: 2 INJECTION INTRAMUSCULAR; INTRAVENOUS at 14:16

## 2020-05-30 RX ADMIN — AMITRIPTYLINE HYDROCHLORIDE 25 MG: 25 TABLET, FILM COATED ORAL at 10:23

## 2020-05-30 RX ADMIN — GABAPENTIN 300 MG: 300 CAPSULE ORAL at 10:22

## 2020-05-30 RX ADMIN — BUSPIRONE HYDROCHLORIDE 20 MG: 10 TABLET ORAL at 21:44

## 2020-05-30 RX ADMIN — AMITRIPTYLINE HYDROCHLORIDE 25 MG: 25 TABLET, FILM COATED ORAL at 21:44

## 2020-05-30 RX ADMIN — OXYCODONE HYDROCHLORIDE AND ACETAMINOPHEN 1 TABLET: 5; 325 TABLET ORAL at 01:58

## 2020-05-30 RX ADMIN — DEXTROSE AND SODIUM CHLORIDE: 5; 450 INJECTION, SOLUTION INTRAVENOUS at 08:08

## 2020-05-30 RX ADMIN — Medication 10 ML: at 09:40

## 2020-05-30 ASSESSMENT — PAIN SCALES - GENERAL
PAINLEVEL_OUTOF10: 7
PAINLEVEL_OUTOF10: 3
PAINLEVEL_OUTOF10: 7
PAINLEVEL_OUTOF10: 0
PAINLEVEL_OUTOF10: 7

## 2020-05-30 ASSESSMENT — PAIN DESCRIPTION - DESCRIPTORS
DESCRIPTORS: ACHING
DESCRIPTORS: DISCOMFORT;SORE

## 2020-05-30 ASSESSMENT — PAIN DESCRIPTION - PAIN TYPE: TYPE: SURGICAL PAIN

## 2020-05-30 ASSESSMENT — PAIN DESCRIPTION - ONSET: ONSET: ON-GOING

## 2020-05-30 ASSESSMENT — PAIN DESCRIPTION - PROGRESSION: CLINICAL_PROGRESSION: NOT CHANGED

## 2020-05-30 ASSESSMENT — PAIN DESCRIPTION - LOCATION
LOCATION: ABDOMEN
LOCATION: ABDOMEN

## 2020-05-30 ASSESSMENT — PAIN DESCRIPTION - FREQUENCY: FREQUENCY: CONTINUOUS

## 2020-05-30 ASSESSMENT — PAIN - FUNCTIONAL ASSESSMENT: PAIN_FUNCTIONAL_ASSESSMENT: PREVENTS OR INTERFERES SOME ACTIVE ACTIVITIES AND ADLS

## 2020-05-30 NOTE — PROGRESS NOTES
[facility] - Progress Note    Today's Date and Time: 5/30/2020, 8:46 AM    Impression :   1. Acute respiratory failure status post intubation 3/17, trach placement 3/27, Replacement in OR 4/2/2020  2. Status post PEG tube placement 3-27-20  3. Peptic ulcer disease  4. Status post large hiatal hernia repair and Nissen semi-fundoplication 9/00/5583  5. Pulmonary edema- Resolved  6. Pleural effusions  7. Acute kidney injury- Resolved  8. Persistent Nausea and intermittent vomiting  9. Abdominal pain  10. Reactive Leukocytosis  11. Severe esophagitis, nodular gastritis, pyloric stenosis by EGD on 5-18-20. S/P pyloric balloon dilatation. 12. Allergy to penicillins- Severe  13- S/P cholecystectomy and pyloroplasty 5-22-20    Recommendations:   · Monitor off antibiotics:   · Completed Meropenem. Start 5-22-20. Stop date 5-30-20. · Prior Antibiotic modifications:  · Completed IV meropenem stop date 4/15//2020  · Completed Diflucan 200 mg po x 5 days. Stop date 5-10-20  ·  Supportive care  · ID wise OK to return to [facility] when stable BP wise   · Suggest simplify CNS acting medications (Elavil, Buspar, Klonopin, Lexapro, Neurontin, Reglan, Seroquel, Trazadone) which may be contributing to mentation changes. Medical Decision Making/Summary/Discussion:5/30/2020     · Patient with severe GERD   · She underwent laparoscopic robotic hiatal hernia repair and fundoplication on 13/59/0280  · She presented back [facility] ER on 2-29 with worsening shortness of breath, dyspnea as well as an episode of dark red emesis. · CTA chest was negative for PE but it showed bilateral pleural effusions and severe esophageal dilatation. · Surgery was consulted. Patient was transferred to [facility] for further management. · Patient was intubated and admitted to the ICU. She improved, was extubated on 3-7 to high flow O2. Pt was transferred out of the ICU on 3-13.   · On 3-14 pts hgb dropped to 6.3 and she developed coffee-ground, then bright red emesis, and was again transferred back to medical ICU. · General surgery performed endoscopy on 3/17 which showed an ulcer at the GE junction and mild gastritis with no active bleeding. · After the procedure she developed acute respiratory distress and a fever of 39.8. She was emergently intubated and started on Meropenem and Vancomycin. · Patient improved with treatment. · Transferred to [facility] on 4-6-20   · ransferred back to [facility] on 5-12-20 because of persistent vomiting, abdominal pain. Concern for possible SBO. · Found to have Severe esophagitis, nodular gastritis, pyloric stenosis by EGD on 5-18-20. S/P pyloric balloon dilatation. · Underwent cholecystectomy and pyloroplasty 5-22-20  · On meropenem perioperatively. · Running low grade fevers. RUL infiltrate improved. Possible effusion/consolidation LLL   · 5-30-20 Patient experienced hypotension and lethargy early this morning. Treated with fluid boluses. Felt to be dehydrated and also to be showing effect of prior Ativan and Percocet administration,    Infection Control Recommendations   · [place] Precautions    Antimicrobial Stewardship Recommendations     · Discontinuation of therapy  Coordination of Outpatient Care:     · Estimated Length of IV antimicrobials:None  · Patient will need Midline Catheter Insertion: No  · Patient will need PICC line Insertion:No  · Patient will need: Home IV , [place],  SNF: TBD  · Patient will need outpatient wound care:Yes  Chief complaint/reason for consultation:   · Intraabdominal infection  · Pneumonia    History of Present Illness:   Curly Rodriguez is a 76y.o.-year-old  female who was initially admitted on 5/12/2020. Patient seen at the request of Alex Wilkerson. INITIAL EVALUATION:     Patient known to my service from prior evaluation at [place] [id number].     Patient has a history of severe GERD despite being on PPI, H2 blocker and Tums.  She underwent opacity. Improvement prior RUL infiltrate  · CXR 5-26-20: vascular congestion. Worsening LLL opacity suggesting pleural effusion. · CXR 5-27-20: Vascular congestion  · CXR 5-30-30: No changes. Residual vascular congestion and Lt basilar opacity.         FL UGI 5-26-20: No gastric outlet obstruction    Discussed with patient, RN. I have personally reviewed the past medical history, past surgical history, medications, social history, and family history, and I have updated the database accordingly.   Past Medical History:     Past Medical History:   Diagnosis Date    Anxiety     Arthritis     Back pain     Bronchitis     Caffeine use     8 coffee / day    Carpal tunnel syndrome     Cataracts, bilateral     Constipation     Depression     Diarrhea     GERD (gastroesophageal reflux disease)     H/O gastric ulcer     Hyperlipidemia     Hypertension     Mumps     MVP (mitral valve prolapse)     Dr. Whit Chilel in May 2019    Recurrent UTI     Dr. Weston Roxanne    Sinusitis     Trache[facility] in place [facility])     Ulcerative esophagitis     Wellness examination     Dr. Rosalea Sicard seen in Jan 2020       Past Surgical  History:     Past Surgical History:   Procedure Laterality Date    APPENDECTOMY      CARPAL TUNNEL RELEASE      CHOLECYSTECTOMY, LAPAROSCOPIC  05/22/2020     LAPAROSCOPIC ROBOTIC CHOLECYSTECTOMY, PYLOROPLASTY     CHOLECYSTECTOMY, LAPAROSCOPIC N/A 5/22/2020    XI LAPAROSCOPIC ROBOTIC CHOLECYSTECTOMY, PYLOROPLASTY performed by Melanie Rivero MD at [address]      EGD  3/17/2020         ERCP  05/21/2020    with stent insertion, balloon dilation, sphinctereotomy    ERCP  5/21/2020    ** CASE IN OR WITY GI STAFF** ERCP STENT INSERTION performed by James Pham MD at [facility]    ERCP  5/21/2020    ** CASE IN OR WITH GI STAFF**ERCP SPHINCTER/PAPILLOTOMY performed by James Pham MD at [facility]    ERCP  5/21/2020    ** CASE IN OR WITH GI STAFF**ERCP DILATION Grandmother         bleeding ulcers    Other Paternal Grandfather         lung cancer        Allergies:   Prednisone; Compazine [prochlorperazine maleate]; Penicillin v potassium; and Penicillins     Review of Systems:   Constitutional: No fevers or chills. No systemic complaints  Head: No headaches  Eyes: No double vision or blurry vision. No conjunctival inflammation. ENT: No sore throat or runny nose. . No hearing loss, tinnitus or vertigo. Cardiovascular: No chest pain or palpitations. No shortness of breath. No HOLLAND  Lung: No shortness of breath or cough. No sputum production  Abdomen: No nausea, vomiting, diarrhea, or abdominal pain. Yulia Abebe No cramps. Genitourinary: No increased urinary frequency, or dysuria. No hematuria. No suprapubic or CVA pain  Musculoskeletal: No muscle aches or pains. No joint effusions, swelling or deformities  Hematologic: No bleeding or bruising. Neurologic: No headache, weakness, numbness, or tingling. Integument: No rash, no ulcers. Psychiatric: No depression. Endocrine: No polyuria, no polydipsia, no polyphagia. Physical Examination :     Patient Vitals for the past 8 hrs:   BP Temp Temp src Pulse Resp SpO2 Weight   05/30/20 0550 -- -- -- -- 24 96 % --   05/30/20 0400 (!) 95/50 98.5 °F (36.9 °C) Axillary 78 22 94 % 198 lb 11.2 oz (90.1 kg)   05/30/20 0215 -- -- -- -- 22 -- --     General Appearance: Awake, alert, and in no apparent distress  Head:  Normocephalic, no trauma  Eyes: Pupils equal, round, reactive to light and accommodation; extraocular movements intact; sclera anicteric; conjunctivae pink. No embolic phenomena. ENT: Oropharynx clear, without erythema, exudate, or thrush. No tenderness of sinuses. Mouth/throat: mucosa pink and moist. No lesions. Dentition in good repair. Neck:Supple, without lymphadenopathy. Thyroid normal, No bruits. Tracheostomy in place. Pulmonary/Chest: Clear to auscultation, without wheezes, rales, or rhonchi.   No dullness to percussion. Cardiovascular: Regular rate and rhythm without murmurs, rubs, or gallops. Abdomen: Soft, non tender. Bowel sounds normal. No organomegaly. PEG in place. Surgical incisions without inflammation. All four Extremities: No cyanosis, clubbing, edema, or effusions. Neurologic: No gross sensory or motor deficits. Skin: Warm and dry with good turgor. No signs of peripheral arterial or venous insufficiency. No ulcerations. No open wounds.         Medical Decision Making -Laboratory:   I have independently reviewed/ordered the following labs:    CBC with Differential:   Recent Labs     05/29/20  0458 05/30/20  0501   WBC 6.2 5.4   HGB 7.7* 7.3*   HCT 25.9* 25.6*    310   LYMPHOPCT 21* 33   MONOPCT 8* 4     BMP:   Recent Labs     05/29/20  0458 05/30/20  0626   * 134*   K 4.5 4.4   CL 98 100   CO2 23 22   BUN 16 18   CREATININE 0.65 0.66   MG 1.9 2.1     Hepatic Function Panel:   Recent Labs     05/28/20  0642   PROT 5.4*   LABALBU 2.1*   BILITOT 0.21*   ALKPHOS 155*   *   *     No results for input(s): RPR in the last 72 hours. No results for input(s): HIV in the last 72 hours. No results for input(s): BC in the last 72 hours.   Lab Results   Component Value Date    MUCUS NOT REPORTED 05/26/2020    RBC 2.78 05/30/2020    TRICHOMONAS NOT REPORTED 05/26/2020    WBC 5.4 05/30/2020    YEAST NOT REPORTED 05/26/2020    TURBIDITY CLOUDY 05/26/2020     Lab Results   Component Value Date    CREATININE 0.66 05/30/2020    GLUCOSE 117 05/30/2020       Medical Decision Making-Imaging:   XR CHEST 1 VW:     Findings:  Mild cardiomegaly is present. Kobe Eleanor is mild prominence of the pulmonary interstitium.  No consolidation, pleural effusion, or pneumothorax is present.  Midline tracheostomy tube is present.  Right-sided PICC line is present with the tip in the SVC.     IMPRESSION:     Mild cardiomegaly with mild prominence of the pulmonary interstitium.        Electronically Signed By: Georgette Segura ROBER M.S. 04/06/2020 15:25:51 EDT     XR ABDOMEN KUB SUPINE:     Portable KUB.     Contrast injected through the feeding tube directly enters the mid stomach.  Bowel pattern is normal.  No incidental findings.     IMPRESSION:     Peg tube in the stomach.        Electronically Signed By: Dr. Colleen Treadwell M.D. 04/06/2020 15:26:25 EDT        CT Abdomen and Pelvis w/o contrast: 4-13-20     Axial acquisition through the abdomen and pelvis without contrast.     Bilateral basilar pulmonary infiltrates are present left greater than right.     A few tiny gallstones are seen layering in the gallbladder, which is mildly distended.  There is no gallbladder wall thickening or pericholecystic fluid, edema.     No liver lesion or bile duct dilatation.     Spleen pancreas left kidney unremarkable.  Absent right kidney.     No intestinal obstruction.  No free air.  No ascites.  No abscess identified.     Urinary bladder is empty, Saldana catheter in place.  Uterus and adnexa not visualized.  Gastrostomy tube tip is in the body of the stomach.     IMPRESSION:     1. No intra-abdominal abscess.  No free air no ascites.  No obstruction. 2. Bilateral basilar pulmonary infiltrates. 3. A few tiny gallstones seen within the gallbladder.  Mild gallbladder distention.        Electronically Signed By: Dr. Jeanna Hillman M.D. 04/13/2020 12:10:34 EDT     XR ABDOMEN KUB SUPINE:4-13-20     Clinical statement: constipation.     Comparison: 4/6/2020.     Findings: A percutaneous gastrostomy tube appears in satisfactory position overlying the stomach.  A nonobstructive bowel gas pattern is noted. There is normal amount of stool appreciated within the colon. Leslye Dustman is no free air. There are no abnormal   masses or calcifications. The osseous structures and soft tissues are unremarkable.     IMPRESSION:     No acute finding.  PEG tube is in place.        Electronically Signed By: Dr. Radha Lao M.D. 04/13/2020 9:45:23 EDT    EXAMINATION:   SINGLE

## 2020-05-30 NOTE — PROGRESS NOTES
Niurka Gamble 19    Progress Note    5/30/2020    8:24 AM    Name:   Gem Charles  MRN:     [id number]     Acct:      [de-identified]   Room:   [address] [place] [address] Day:  25  Admit Date:  5/12/2020  3:19 PM    PCP:   Danny Tobar MD  Code Status:  Full Code    Subjective:     C/C: N/V/ TF intolerance  Interval History Status: improved    Patient seen and examined at bedside, had a rapid response overnight for hypotension and lethargy , given fluids boluses and was transferred to to stepdown   No vomiting   When I saw her she was more awake and   Just finished  Meropenem  Patient vitals, labs and all providers notes were reviewed,from overnight shift and morning updates were noted and discussed with the nurse    Brief History:        Radha Rosales a 76 y.o. Non-/non  female who presents with No chief complaint on file.   and is admitted to the hospital for the management of SBO  Patient transferred to our facility from [facility] with possibility of bowel obstruction, 2 months ago she had prolonged extensive hospital stay, at the end of February had a Nissen fundoplication with possible aspiration issues and was admitted and stayed in the hospital for 37 days during that time she was intubated twice had hematemesis and had EGD that showed gastric ulcer and was seen by a specialist and after long course she was successfully transitioned to a trach and PEG and discharged to [facility] on 4/6. This time the patient has been having increasing abdominal pain nausea and vomiting over that has been worsening since she was discharged over at [facility]  Patient had ongoing issues since then, had SBO that resolved , since then she has an issue  with tolerating TF,  a PICC line placed on 4/25 at [facility] and was startd TPN , given she had persistent N/V, TF held and  PEG tube has been down to gravity for the last couple of days.   She also has been treated for UTI and finishing a course of Zyvox ordered by ID.     Anxiety continues overnight  VSS- modified barium   COVID swab  esogram possible if warranted by VSS; and possible peg tube eval  Fevers overnight prompting blood cultures x2 urinalysis with culture all pending at current time  Passed swallow study  UGI series show gastric outlet obstruction,GI consult added by surgery    Review of Systems:     Constitutional:  negative for chills, fevers, sweats  Respiratory:  negative for cough, dyspnea on exertion, shortness of breath, wheezing  Cardiovascular:  negative for chest pain, chest pressure/discomfort, lower extremity edema, palpitations  Gastrointestinal:  negative for abdominal pain, constipation, diarrhea, nausea, vomiting  Neurological:  negative for dizziness, headache    Medications: Allergies:     Allergies   Allergen Reactions    Prednisone Anxiety    Compazine [Prochlorperazine Maleate]     Penicillin V Potassium     Penicillins Other (See Comments)     shock       Current Meds:   Scheduled Meds:    sodium chloride  250 mL Intravenous Once    sodium chloride  500 mL Intravenous Once    meropenem  1 g Intravenous Q8H    sodium chloride flush  10 mL Intravenous 2 times per day    sodium chloride flush  10 mL Intravenous 2 times per day    enoxaparin  40 mg Subcutaneous Daily    pantoprazole  40 mg Intravenous BID    And    sodium chloride (PF)  10 mL Intravenous BID    sucralfate  1 g Oral 4 times per day    metoprolol succinate  25 mg Oral Daily    [Held by provider] furosemide  20 mg Oral Daily    bisacodyl  10 mg Rectal Daily    clonazePAM  0.5 mg Oral BID    QUEtiapine  100 mg Oral Nightly    traZODone  100 mg Oral Nightly    tamsulosin  0.4 mg Oral Daily    metoclopramide  10 mg Intravenous Q6H    sodium chloride flush  10 mL Intravenous 2 times per day    amitriptyline  25 mg Oral TID    busPIRone  20 mg Oral TID    escitalopram  20 mg Oral Daily    gabapentin  300 mg Oral TID     Continuous Infusions:    PN-Adult 2-in-1 Central Line (Standard) 60 mL/hr at 20 1824    dextrose 5 % and 0.45 % NaCl 75 mL/hr at 20 0808     PRN Meds: ipratropium-albuterol, acetaminophen, sodium chloride flush, fentanNYL, midazolam, sodium chloride flush, ondansetron, fentanNYL, fentanNYL, fentanNYL, fentanNYL, oxyCODONE-acetaminophen, labetalol, hydrALAZINE, LORazepam, acetaminophen, sodium chloride flush, potassium chloride **OR** potassium alternative oral replacement **OR** potassium chloride, magnesium sulfate, nicotine    Data:     Past Medical History:   has a past medical history of Anxiety, Arthritis, Back pain, Bronchitis, Caffeine use, Carpal tunnel syndrome, Cataracts, bilateral, Constipation, Depression, Diarrhea, GERD (gastroesophageal reflux disease), H/O gastric ulcer, Hyperlipidemia, Hypertension, Mumps, MVP (mitral valve prolapse), Recurrent UTI, Sinusitis, Tracheostomy in place [facility]), Ulcerative esophagitis, and Wellness examination. Social History:   reports that she has been smoking cigarettes. She has a 50.00 pack-year smoking history. She has never used smokeless tobacco. She reports that she does not drink alcohol or use drugs. Family History:   Family History   Problem Relation Age of Onset   [facility] Cancer Mother         uterine    Heart Attack Mother     Heart Attack Father     Other Maternal Grandfather         foot gangrene    Other Paternal Grandmother         bleeding ulcers    Other Paternal Grandfather         lung cancer       Vitals:  BP (!) 95/50   Pulse 78   Temp 98.5 °F (36.9 °C) (Axillary)   Resp 24   Ht 5' 3\" (1.6 m)   Wt 198 lb 11.2 oz (90.1 kg)   SpO2 96%   BMI 35.20 kg/m²   Temp (24hrs), Av.6 °F (37 °C), Min:97.8 °F (36.6 °C), Max:99.5 °F (37.5 °C)    Recent Labs     20  0504 20  0535 20  0608   POCGLU 113* 124* 104 114*       I/O (24Hr):     Intake/Output Summary (Last 24 hours) at 2020 0817  Last data filed at 5/30/2020 0506  Gross per 24 hour   Intake 1683 ml   Output 2125 ml   Net -442 ml       Labs:  Hematology:  Recent Labs     05/28/20  0642 05/29/20  0458 05/30/20  0501   WBC 6.0 6.2 5.4   RBC 2.97* 2.93* 2.78*   HGB 7.9* 7.7* 7.3*   HCT 27.7* 25.9* 25.6*   MCV 93.3 88.4 92.1   MCH 26.6 26.3 26.3   MCHC 28.5 29.7 28.5   RDW 18.4* 18.5* 18.6*    283 310   MPV 11.8 11.4 11.4     Chemistry:  Recent Labs     05/28/20  0642 05/29/20  0458 05/30/20  0626    132* 134*   K 4.5 4.5 4.4    98 100   CO2 24 23 22   GLUCOSE 115* 109* 117*   BUN 18 16 18   CREATININE 0.57 0.65 0.66   MG  --  1.9 2.1   ANIONGAP 10 11 12   LABGLOM >60 >60 >60   GFRAA >60 >60 >60   CALCIUM 9.1 8.8 8.7   PHOS  --  3.2  --    TROPHS  --   --  63*   LACTACIDWB  --   --  1.0     Recent Labs     05/28/20  0642  05/29/20  0719 05/29/20  1146 05/29/20 2032 05/30/20  0504 05/30/20  0535 05/30/20  0608 05/30/20  0626   PROT 5.4*  --   --   --   --   --   --   --   --    LABALBU 2.1*  --   --   --   --   --   --   --   --    *  --   --   --   --   --   --   --   --    *  --   --   --   --   --   --   --   --    ALKPHOS 155*  --   --   --   --   --   --   --   --    BILITOT 0.21*  --   --   --   --   --   --   --   --    AMMONIA  --   --   --   --   --   --   --   --  49   POCGLU  --    < > 120* 110* 113* 124* 104 114*  --     < > = values in this interval not displayed.      ABG:  Lab Results   Component Value Date    POCPH 7.437 05/30/2020    POCPCO2 42.3 05/30/2020    POCPO2 129.9 05/30/2020    POCHCO3 28.6 05/30/2020    NBEA NOT REPORTED 05/30/2020    PBEA 4 05/30/2020    VSK1XTB 30 05/30/2020    DGIP0TVY 99 05/30/2020    FIO2 40.0 05/30/2020     Lab Results   Component Value Date/Time    SPECIAL NOT REPORTED 05/26/2020 04:32 PM     Lab Results   Component Value Date/Time    CULTURE ENTEROCOCCUS FAECALIS 10 to 50,000 CFU/ML (A) 05/26/2020 04:32 PM       Radiology:  Mike Mccallum Abdomen (kub) (single Ap View)    Result Date: 5/24/2020  Nonspecific abdominal bowel gas pattern. Mild gaseous distention of several colonic loops. Fl Ercp Biliary S&i    Result Date: 5/21/2020  Intraprocedural fluoroscopic spot images as above. See separate procedure report for more information. Xr Chest Portable    Result Date: 5/26/2020  Coarse reticular interstitial opacities remain in place. Retrocardiac opacity. Possible pneumonia with or without pleural effusion     Xr Chest Portable    Result Date: 5/24/2020  Left lower lobe opacification concerning for infiltrate and pneumonia. Continued interval clearing of right upper lobe opacification.        Physical Examination:        General appearance:  alert, cooperative,looks anxious   Trach noted  Mental Status:  oriented to person, place and time and normal affect  Lungs:  diminished bilaterally, normal effort  Heart:  regular rate and rhythm, no murmur  Abdomen: Peg tube noted,  soft, nontender, nondistended, normal bowel sounds, no masses, hepatomegaly, splenomegaly  Extremities:  no edema, redness, tenderness in the calves  Skin:  no gross lesions, rashes, induration    Assessment:        Hospital Problems           Last Modified POA    * (Principal) Gastric outlet obstruction 5/16/2020 Yes    MVP (mitral valve prolapse) 5/12/2020 Yes    Dysphagia 5/22/2020 Yes    Centrilobular emphysema ([address] 5/12/2020 Yes    Fever 5/14/2020 Yes    Recurrent UTI 5/12/2020 Yes    Overview Signed 5/12/2020  5:22 PM by MD Dr. Edelmira Calzada         Tracheostomy in place [facility]) 5/12/2020 Yes    H/O gastric ulcer 5/12/2020 Yes    Hyperlipidemia 5/12/2020 Yes    Hypertension 5/12/2020 Yes    Tobacco abuse 5/12/2020 Yes    Chronic respiratory failure with hypoxia ([address] 5/12/2020 Yes    Status post insertion of percutaneous endoscopic gastrostomy (PEG) tube ([address] 5/12/2020 Yes    S/P Nissen fundoplication (without gastrostomy tube) procedure 5/12/2020 Yes    Paralytic ileus ([address] 5/17/2020 Yes Elevated liver enzymes 5/17/2020 Yes          Plan:           Encephalopathy : Multifactorial likely secondary to hypotension and polypharmacy. Patient has significant psychiatric history we did communicate with the PCP on admission who mentioned she is a stable on the current regimen, for now we will hold Ativan and Neurontin   Mentation is currently much better after blood pressure improvement. Hypotension:   Likely 2/2 multiple medications, need to R/O sepsis  Will hold Ativan, neurontin  and Narcotics for now   Add Midodrine tid    S/p hiatal hernia repair with Mark  fundoplication ( early in 9/6179)       Gastric outlet obstruction/ pylorus paralysis: S/P pylorotomy on 5/22, UGI FT with no leak or obstruction     Status post cholecystectomy per surgery on 5/22     Dilated CBD on MRCP : S/P ERCP 5/21 with sphincterotomy, balloon sweeping and stent placement for CBD distal stricture    Severe esophagitis/gastritis and stenotic pylorus on EGD: S/p balloon dilatation    Recurrent UTI: No evidence of current UTI    At risk of malnutrition: Continue TPN for now along with dysphagia diet   She is still with poor tolerance     Fever :  Source unclear yet, given the diarrhea ID added C diff.  Also I will repeat CXR      Will continue with the same  Fluid rate      Chronic respiratory failure with hypoxia/tracheostomy in place: Trach care per respiratory therapy, continue supplemental oxygen.     Urinary retention: Patient refusing intermittent cath, urology consulted recommends to keep the Chu in, Flomax added   WiIll be discharged with chu   I discussed with the patient and encouraged her to agree to removing it      COPD: Continue inhalers      History of bipolar/anxiety: Continue psych medications, continue Ativan for anxiety     HTN: continue home medications     HPL: continue home medications     DM2: Continue diabetes home medications , insulin sliding scale, hypoglycemia protocol     DVT prophylaxis     Discussed with Dr Jose Andino ,  Currently improved and stable , will continue to  monitor and if got worse ( hypotensive , lethargic     Discussed with the patient and the nurse        Spent 40 minutes  in examining, assessing and adjusting treatment / adding work up and discussing the plane with  with patient/staff and specialist       Jerson Camarena MD  5/30/2020  8:24 AM

## 2020-05-30 NOTE — PROGRESS NOTES
Physical Therapy  Facility/Department: [address] STEPDOWN  Daily Treatment Note  NAME: Nelson Reilly  : 1945  MRN: [id number]    Date of Service: 2020    Discharge Recommendations:  Patient would benefit from continued therapy after discharge   PT Equipment Recommendations  Equipment Needed: No    Assessment   Body structures, Functions, Activity limitations: Decreased functional mobility ; Decreased endurance;Decreased strength;Decreased balance;Decreased ROM; Increased pain  Assessment: Pt c/o pain and fatigue and increased pain with mobility, requiring max encouragement and education to participate in PT today. Pt requiring MaxA for bed mobility and ModA for sitting balance at EOB. Pt not safe to return home w/o assistance  Prognosis: Guarded  PT Education: Transfer Training;General Safety;Gait Training;Functional Mobility Training;Energy Conservation  Patient Education: Importance of mobility and ROM  REQUIRES PT FOLLOW UP: Yes  Activity Tolerance  Activity Tolerance: Patient limited by fatigue;Patient limited by pain; Patient limited by endurance     Patient Diagnosis(es): There were no encounter diagnoses. has a past medical history of Anxiety, Arthritis, Back pain, Bronchitis, Caffeine use, Carpal tunnel syndrome, Cataracts, bilateral, Constipation, Depression, Diarrhea, GERD (gastroesophageal reflux disease), H/O gastric ulcer, Hyperlipidemia, Hypertension, Mumps, MVP (mitral valve prolapse), Recurrent UTI, Sinusitis, Tracheostomy in place [facility]), Ulcerative esophagitis, and Wellness examination. has a past surgical history that includes Hysterectomy; total nephrectomy; Tonsillectomy; Appendectomy; Cystoscopy; Ovary removal; Tubal ligation; rhinoplasty; Carpal tunnel release; Hand surgery; Total hip arthroplasty; eye surgery; Colonoscopy; joint replacement (Right); Gastric fundoplication (N/A, );  cath power picc triple (3/4/2020); EGD (3/17/2020);  Upper gastrointestinal endoscopy (N/A, 3/17/2020); tracheostomy (N/A, 3/27/2020); Gastrostomy tube placement (N/A, 3/27/2020); tracheostomy (N/A, 4/2/2020); Upper gastrointestinal endoscopy (N/A, 5/18/2020); Upper gastrointestinal endoscopy (5/18/2020); ERCP (05/21/2020); Cholecystectomy, laparoscopic (05/22/2020); ERCP (5/21/2020); ERCP (5/21/2020); ERCP (5/21/2020); and Cholecystectomy, laparoscopic (N/A, 5/22/2020). Restrictions  Restrictions/Precautions  Restrictions/Precautions: General Precautions, Fall Risk, Up as Tolerated, Surgical Protocols  Required Braces or Orthoses?: No  Position Activity Restriction  Other position/activity restrictions: Up with assistance   Subjective   General  Chart Reviewed: Yes  Response To Previous Treatment: Patient reporting fatigue but able to participate. Family / Caregiver Present: No  Subjective  Subjective: RN agreeable to PT. Pt alert in bed upon arrival, very anxious/tearful re: medical issues. Required mod encouragement for participation. General Comment  Comments: Pt left in bed with call light within reach  Pain Screening  Patient Currently in Pain: Yes  Pain Assessment  Pain Assessment: 0-10  Pain Level: 7  Pain Type: Surgical pain  Pain Location: Abdomen  Pain Descriptors: Discomfort; Sore  Pain Frequency: Continuous  Pain Onset: On-going  Clinical Progression: Not changed  Functional Pain Assessment: Prevents or interferes some active activities and ADLs  Non-Pharmaceutical Pain Intervention(s): Ambulation/Increased Activity;Repositioned; Therapeutic presence  Vital Signs  Patient Currently in Pain: Yes       Orientation  Orientation  Overall Orientation Status: Within Functional Limits  Cognition      Objective   Bed mobility  Rolling to Left: Maximum assistance  Rolling to Right: Maximum assistance  Supine to Sit: Maximum assistance  Sit to Supine: Maximum assistance  Scooting: Maximal assistance  Comment: Pt agreeable to sit EOB only, very fearful of mobility and c/o increased pain with Yamila Stringer, PTA

## 2020-05-30 NOTE — FLOWSHEET NOTE
SPIRITUAL CARE DEPARTMENT - [address]  PROGRESS NOTE    Shift date: 5/30/2020  Shift day: Friday   Shift # 3    Room # 1267/4683-36   Name: Eleonora Madrid            Age: 76 y.o. Gender: female          Catholic: Religious   Place of Hoah[place]ism:    Referral: Rapid Response    Admit Date & Time: 5/12/2020  3:19 PM    PATIENT/EVENT DESCRIPTION:  Eleonora Madrid is a 76 y.o. female in room 444.  responded to Rapid Response. SPIRITUAL ASSESSMENT/INTERVENTION:   responded to Rapid Response via [employer]. Patient was being treated by multi disciplinary team.   prepared and offerred to call patient's son Haseeb Lassiter [phone], nurse will call.  was ministry of presence. Nurses focusing on keeping patient awake, not alert.  will follow up as needed. SPIRITUAL CARE FOLLOW-UP PLAN:  Chaplains will remain available to offer spiritual and emotional support as needed. Electronically signed by Ben Clarke Resident      05/30/20 3435   Encounter Summary   Services provided to: Patient   Referral/Consult From: Multi-disciplinary team   Support System Children;Family members   Place of Scientologist   (Adelaiderkristie Lei)   Continue Visiting   (5/30/2020)   Complexity of Encounter Low   Length of Encounter 15 minutes   Spiritual Assessment Completed Yes   Crisis   Type Rapid response   Assessment Approachable; Anxious   Intervention Sustaining presence/ Ministry of presence   Outcome Did not respond   , on 5/30/2020 at [address]  [phone]

## 2020-05-31 LAB
ABSOLUTE EOS #: 0.33 K/UL (ref 0–0.4)
ABSOLUTE IMMATURE GRANULOCYTE: 0.25 K/UL (ref 0–0.3)
ABSOLUTE LYMPH #: 1.58 K/UL (ref 1–4.8)
ABSOLUTE MONO #: 0.5 K/UL (ref 0.1–0.8)
BASOPHILS # BLD: 0 % (ref 0–2)
BASOPHILS ABSOLUTE: 0 K/UL (ref 0–0.2)
DIFFERENTIAL TYPE: ABNORMAL
EOSINOPHILS RELATIVE PERCENT: 4 % (ref 1–4)
GLUCOSE BLD-MCNC: 122 MG/DL (ref 65–105)
GLUCOSE BLD-MCNC: 125 MG/DL (ref 65–105)
HCT VFR BLD CALC: 24.1 % (ref 36.3–47.1)
HEMOGLOBIN: 7.5 G/DL (ref 11.9–15.1)
IMMATURE GRANULOCYTES: 3 %
LYMPHOCYTES # BLD: 19 % (ref 24–44)
MCH RBC QN AUTO: 26.7 PG (ref 25.2–33.5)
MCHC RBC AUTO-ENTMCNC: 31.1 G/DL (ref 28.4–34.8)
MCV RBC AUTO: 85.8 FL (ref 82.6–102.9)
MONOCYTES # BLD: 6 % (ref 1–7)
MORPHOLOGY: ABNORMAL
NRBC AUTOMATED: 0 PER 100 WBC
PDW BLD-RTO: 18.5 % (ref 11.8–14.4)
PLATELET # BLD: 352 K/UL (ref 138–453)
PLATELET ESTIMATE: ABNORMAL
PMV BLD AUTO: 11.6 FL (ref 8.1–13.5)
RBC # BLD: 2.81 M/UL (ref 3.95–5.11)
RBC # BLD: ABNORMAL 10*6/UL
SEG NEUTROPHILS: 68 % (ref 36–66)
SEGMENTED NEUTROPHILS ABSOLUTE COUNT: 5.64 K/UL (ref 1.8–7.7)
WBC # BLD: 8.3 K/UL (ref 3.5–11.3)
WBC # BLD: ABNORMAL 10*3/UL

## 2020-05-31 PROCEDURE — 82947 ASSAY GLUCOSE BLOOD QUANT: CPT

## 2020-05-31 PROCEDURE — 2700000000 HC OXYGEN THERAPY PER DAY

## 2020-05-31 PROCEDURE — 2580000003 HC RX 258: Performed by: SURGERY

## 2020-05-31 PROCEDURE — 6360000002 HC RX W HCPCS: Performed by: SURGERY

## 2020-05-31 PROCEDURE — 6370000000 HC RX 637 (ALT 250 FOR IP): Performed by: SURGERY

## 2020-05-31 PROCEDURE — 6370000000 HC RX 637 (ALT 250 FOR IP): Performed by: STUDENT IN AN ORGANIZED HEALTH CARE EDUCATION/TRAINING PROGRAM

## 2020-05-31 PROCEDURE — 1200000000 HC SEMI PRIVATE

## 2020-05-31 PROCEDURE — 99232 SBSQ HOSP IP/OBS MODERATE 35: CPT | Performed by: INTERNAL MEDICINE

## 2020-05-31 PROCEDURE — 85025 COMPLETE CBC W/AUTO DIFF WBC: CPT

## 2020-05-31 PROCEDURE — 6370000000 HC RX 637 (ALT 250 FOR IP): Performed by: FAMILY MEDICINE

## 2020-05-31 PROCEDURE — 99232 SBSQ HOSP IP/OBS MODERATE 35: CPT | Performed by: FAMILY MEDICINE

## 2020-05-31 PROCEDURE — 2500000003 HC RX 250 WO HCPCS: Performed by: FAMILY MEDICINE

## 2020-05-31 PROCEDURE — 6360000002 HC RX W HCPCS: Performed by: FAMILY MEDICINE

## 2020-05-31 PROCEDURE — 2580000003 HC RX 258: Performed by: ANESTHESIOLOGY

## 2020-05-31 PROCEDURE — 97535 SELF CARE MNGMENT TRAINING: CPT

## 2020-05-31 PROCEDURE — 36415 COLL VENOUS BLD VENIPUNCTURE: CPT

## 2020-05-31 PROCEDURE — 93005 ELECTROCARDIOGRAM TRACING: CPT | Performed by: FAMILY MEDICINE

## 2020-05-31 PROCEDURE — C9113 INJ PANTOPRAZOLE SODIUM, VIA: HCPCS | Performed by: SURGERY

## 2020-05-31 RX ORDER — CLONAZEPAM 0.5 MG/1
0.25 TABLET ORAL 2 TIMES DAILY
Status: DISCONTINUED | OUTPATIENT
Start: 2020-05-31 | End: 2020-06-02 | Stop reason: HOSPADM

## 2020-05-31 RX ORDER — DOCUSATE SODIUM 100 MG/1
100 CAPSULE, LIQUID FILLED ORAL DAILY
Status: DISCONTINUED | OUTPATIENT
Start: 2020-05-31 | End: 2020-06-02 | Stop reason: HOSPADM

## 2020-05-31 RX ORDER — LORAZEPAM 2 MG/ML
0.5 INJECTION INTRAMUSCULAR EVERY 4 HOURS PRN
Status: DISCONTINUED | OUTPATIENT
Start: 2020-05-31 | End: 2020-06-02 | Stop reason: HOSPADM

## 2020-05-31 RX ADMIN — FUROSEMIDE 20 MG: 20 TABLET ORAL at 09:42

## 2020-05-31 RX ADMIN — AMITRIPTYLINE HYDROCHLORIDE 25 MG: 25 TABLET, FILM COATED ORAL at 13:48

## 2020-05-31 RX ADMIN — POTASSIUM CHLORIDE: 2 INJECTION, SOLUTION, CONCENTRATE INTRAVENOUS at 18:08

## 2020-05-31 RX ADMIN — DOCUSATE SODIUM 100 MG: 100 CAPSULE, LIQUID FILLED ORAL at 09:35

## 2020-05-31 RX ADMIN — SUCRALFATE 1 G: 1 TABLET ORAL at 20:34

## 2020-05-31 RX ADMIN — BUSPIRONE HYDROCHLORIDE 20 MG: 10 TABLET ORAL at 13:47

## 2020-05-31 RX ADMIN — ONDANSETRON 4 MG: 2 INJECTION INTRAMUSCULAR; INTRAVENOUS at 09:18

## 2020-05-31 RX ADMIN — LORAZEPAM 1 MG: 2 INJECTION INTRAMUSCULAR at 09:22

## 2020-05-31 RX ADMIN — SODIUM CHLORIDE, PRESERVATIVE FREE 10 ML: 5 INJECTION INTRAVENOUS at 09:34

## 2020-05-31 RX ADMIN — BUSPIRONE HYDROCHLORIDE 20 MG: 10 TABLET ORAL at 09:17

## 2020-05-31 RX ADMIN — ENOXAPARIN SODIUM 40 MG: 40 INJECTION SUBCUTANEOUS at 09:17

## 2020-05-31 RX ADMIN — AMITRIPTYLINE HYDROCHLORIDE 25 MG: 25 TABLET, FILM COATED ORAL at 09:18

## 2020-05-31 RX ADMIN — SODIUM CHLORIDE, PRESERVATIVE FREE 10 ML: 5 INJECTION INTRAVENOUS at 09:35

## 2020-05-31 RX ADMIN — LORAZEPAM 0.5 MG: 2 INJECTION INTRAMUSCULAR; INTRAVENOUS at 13:44

## 2020-05-31 RX ADMIN — PANTOPRAZOLE SODIUM 40 MG: 40 INJECTION, POWDER, LYOPHILIZED, FOR SOLUTION INTRAVENOUS at 09:19

## 2020-05-31 RX ADMIN — METOCLOPRAMIDE 10 MG: 5 INJECTION, SOLUTION INTRAMUSCULAR; INTRAVENOUS at 04:43

## 2020-05-31 RX ADMIN — OXYCODONE HYDROCHLORIDE AND ACETAMINOPHEN 1 TABLET: 5; 325 TABLET ORAL at 15:40

## 2020-05-31 RX ADMIN — LORAZEPAM 1 MG: 2 INJECTION INTRAMUSCULAR at 04:44

## 2020-05-31 RX ADMIN — Medication 10 ML: at 09:20

## 2020-05-31 RX ADMIN — SUCRALFATE 1 G: 1 TABLET ORAL at 04:43

## 2020-05-31 RX ADMIN — TAMSULOSIN HYDROCHLORIDE 0.4 MG: 0.4 CAPSULE ORAL at 09:33

## 2020-05-31 RX ADMIN — METOCLOPRAMIDE 10 MG: 5 INJECTION, SOLUTION INTRAMUSCULAR; INTRAVENOUS at 15:31

## 2020-05-31 RX ADMIN — SUCRALFATE 1 G: 1 TABLET ORAL at 15:31

## 2020-05-31 RX ADMIN — Medication 10 ML: at 20:36

## 2020-05-31 RX ADMIN — QUETIAPINE FUMARATE 100 MG: 100 TABLET ORAL at 20:34

## 2020-05-31 RX ADMIN — AMITRIPTYLINE HYDROCHLORIDE 25 MG: 25 TABLET, FILM COATED ORAL at 20:34

## 2020-05-31 RX ADMIN — CLONAZEPAM 0.25 MG: 0.5 TABLET ORAL at 20:35

## 2020-05-31 RX ADMIN — SUCRALFATE 1 G: 1 TABLET ORAL at 09:35

## 2020-05-31 RX ADMIN — ESCITALOPRAM OXALATE 20 MG: 10 TABLET ORAL at 09:17

## 2020-05-31 RX ADMIN — METOCLOPRAMIDE 10 MG: 5 INJECTION, SOLUTION INTRAMUSCULAR; INTRAVENOUS at 20:34

## 2020-05-31 RX ADMIN — METOCLOPRAMIDE 10 MG: 5 INJECTION, SOLUTION INTRAMUSCULAR; INTRAVENOUS at 09:22

## 2020-05-31 RX ADMIN — TRAZODONE HYDROCHLORIDE 100 MG: 100 TABLET ORAL at 20:34

## 2020-05-31 RX ADMIN — SODIUM CHLORIDE, PRESERVATIVE FREE 10 ML: 5 INJECTION INTRAVENOUS at 20:35

## 2020-05-31 RX ADMIN — BUSPIRONE HYDROCHLORIDE 20 MG: 10 TABLET ORAL at 20:34

## 2020-05-31 RX ADMIN — PANTOPRAZOLE SODIUM 40 MG: 40 INJECTION, POWDER, LYOPHILIZED, FOR SOLUTION INTRAVENOUS at 20:34

## 2020-05-31 ASSESSMENT — PAIN SCALES - GENERAL
PAINLEVEL_OUTOF10: 8

## 2020-05-31 ASSESSMENT — PAIN DESCRIPTION - DESCRIPTORS
DESCRIPTORS: ACHING
DESCRIPTORS: ACHING;DISCOMFORT
DESCRIPTORS: ACHING

## 2020-05-31 ASSESSMENT — PAIN DESCRIPTION - PROGRESSION
CLINICAL_PROGRESSION: NOT CHANGED

## 2020-05-31 ASSESSMENT — PAIN DESCRIPTION - LOCATION
LOCATION: ABDOMEN

## 2020-05-31 ASSESSMENT — PAIN DESCRIPTION - ONSET
ONSET: ON-GOING

## 2020-05-31 ASSESSMENT — PAIN DESCRIPTION - ORIENTATION: ORIENTATION: RIGHT

## 2020-05-31 ASSESSMENT — PAIN DESCRIPTION - FREQUENCY
FREQUENCY: CONTINUOUS

## 2020-05-31 ASSESSMENT — PAIN DESCRIPTION - PAIN TYPE
TYPE: ACUTE PAIN;SURGICAL PAIN
TYPE: ACUTE PAIN;SURGICAL PAIN
TYPE: SURGICAL PAIN;ACUTE PAIN

## 2020-05-31 ASSESSMENT — PAIN - FUNCTIONAL ASSESSMENT
PAIN_FUNCTIONAL_ASSESSMENT: PREVENTS OR INTERFERES WITH MANY ACTIVE NOT PASSIVE ACTIVITIES
PAIN_FUNCTIONAL_ASSESSMENT: PREVENTS OR INTERFERES SOME ACTIVE ACTIVITIES AND ADLS

## 2020-05-31 NOTE — PROGRESS NOTES
[facility] - Progress Note    Today's Date and Time: 5/31/2020, 9:39 AM    Impression :   1. Acute respiratory failure status post intubation 3/17, trach placement 3/27, Replacement in OR 4/2/2020  2. Status post PEG tube placement 3-27-20  3. Peptic ulcer disease  4. Status post large hiatal hernia repair and Nissen semi-fundoplication 9/01/2985  5. Pulmonary edema- Resolved  6. Pleural effusions  7. Acute kidney injury- Resolved  8. Persistent Nausea and intermittent vomiting  9. Abdominal pain  10. Reactive Leukocytosis  11. Severe esophagitis, nodular gastritis, pyloric stenosis by EGD on 5-18-20. S/P pyloric balloon dilatation. 12. Allergy to penicillins- Severe  13- S/P cholecystectomy and pyloroplasty 5-22-20    Recommendations:   · Monitor off antibiotics:   · Completed Meropenem. Start 5-22-20. Stop date 5-30-20. · Prior Antibiotic modifications:  · Completed IV meropenem stop date 4/15//2020  · Completed Diflucan 200 mg po x 5 days. Stop date 5-10-20  ·  Supportive care  · ID wise OK to return to [facility] when stable BP wise   · Suggest simplify CNS acting medications (Elavil, Buspar, Klonopin, Lexapro, Neurontin, Reglan, Seroquel, Trazadone) which may be contributing to mentation changes. Medical Decision Making/Summary/Discussion:5/31/2020     · Patient with severe GERD   · She underwent laparoscopic robotic hiatal hernia repair and fundoplication on 31/62/2562  · She presented [facility] on [facility] with worsening shortness of breath, dyspnea as well as an episode of dark red emesis. · CTA chest was negative for PE but it showed bilateral pleural effusions and severe esophageal dilatation. · Surgery was consulted. Patient was transferred to [facility] for further management. · Patient was intubated and admitted to the ICU. She improved, was extubated on 3-7 to high flow O2. Pt was transferred out of the ICU on 3-13.   · On 3-14 pts hgb dropped to for esophagram to evaluate the hiatal hernia repair. If the esophagram appears normal--> we will plan for a PEGogram and evaluate whether the stomach is emptying appropriately. Underwent cholecystectomy and pyloroplasty 5-22-20    CURRENT EVALUATION: 5/31/2020     Afebrile  VS stable    Awake,more responsive  Tolerating tube feeds better    Abdomen mildly distended, minimally tender  Has normal bowel sounds  Abdominal incision clean and dry  G tube site clean and dry    Tracheostomy capped. Tracheal secretions scant, whitish. Patient is possibly silently aspirating which could account for the low grade fevers  CXR 5-30-30: No changes. Residual vascular congestion and Lt basilar opacity.     Patient to return to [facility] when stable,  where we will continue to follow.     Pertinent Labs and X rays reviewed: 5/31/2020       BUN: 24-->19-->21->31-->22-->18  Cr. :0.69-->1.0-->0.79->0.76-->0.81-->0.65-->0.57-->0.66     WBC: 7.7-->13.5->8.9-->7.4-->8.4-->6.2-->5.4  Hb:8.8-->9.0->9.5-->8.8-->9.0-->8.3-->7.3  Plat: 179-->339-->238-->262-->249-->390     Cultures:  Urine:  · 4-11-20: No growth  · 5-26-20: E faecalis 10-50K  Blood:  · 4-11-20: No growth  · 5-3 x 2 no growth  · 5-14 x 2 no growth   · 5-23 x 2 no growth    Sputum :  ·    Wound:    COVID tests:  · 5-14 COVID negative   · 5-20 COVID negative      · CXR 4-11-20: cardiomegaly, lungs clear  · KUB 4-13-20; Normal  · CT Abdomen 4-13-20: Few gallstones with mild GB distension. Otherwise abdomen normal.  · Bilateral pulmonary basilar infiltrates. · Abd XR 4-27-20: Dilated loops of bowel. Ileus  · CXR 4-27-20: Vascular congestion, small Lt pleural effusion  · CXR 5-6-20:  Cardiomegaly with Rt side infiltrate  · CXR 5-8-20: Cardiomegaly with minimal RUL infiltrate  ·  CXR 5-13-20: Moderate edema improved  · CXR 5-24-20: LLL opacity. Improvement prior RUL infiltrate  · CXR 5-26-20: vascular congestion. Worsening LLL opacity suggesting pleural effusion.   · CXR Grandfather         lung cancer        Allergies:   Prednisone; Compazine [prochlorperazine maleate]; Penicillin v potassium; and Penicillins     Review of Systems:   Constitutional: No fevers or chills. No systemic complaints  Head: No headaches  Eyes: No double vision or blurry vision. No conjunctival inflammation. ENT: No sore throat or runny nose. . No hearing loss, tinnitus or vertigo. Cardiovascular: No chest pain or palpitations. No shortness of breath. No HOLLAND  Lung: No shortness of breath or cough. No sputum production  Abdomen: No nausea, vomiting, diarrhea, or abdominal pain. Kathye Mash No cramps. Genitourinary: No increased urinary frequency, or dysuria. No hematuria. No suprapubic or CVA pain  Musculoskeletal: No muscle aches or pains. No joint effusions, swelling or deformities  Hematologic: No bleeding or bruising. Neurologic: No headache, weakness, numbness, or tingling. Integument: No rash, no ulcers. Psychiatric: No depression. Endocrine: No polyuria, no polydipsia, no polyphagia. Physical Examination :     Patient Vitals for the past 8 hrs:   BP Temp Temp src Pulse Resp SpO2 Weight   05/31/20 0900 (!) 134/48 98.2 °F (36.8 °C) Temporal 89 23 98 % --   05/31/20 0851 -- -- -- -- (!) 33 98 % --   05/31/20 0436 (!) 133/56 98.4 °F (36.9 °C) Oral -- -- -- 186 lb 1.1 oz (84.4 kg)     General Appearance: Awake, alert, and in no apparent distress  Head:  Normocephalic, no trauma  Eyes: Pupils equal, round, reactive to light and accommodation; extraocular movements intact; sclera anicteric; conjunctivae pink. No embolic phenomena. ENT: Oropharynx clear, without erythema, exudate, or thrush. No tenderness of sinuses. Mouth/throat: mucosa pink and moist. No lesions. Dentition in good repair. Neck:Supple, without lymphadenopathy. Thyroid normal, No bruits. Tracheostomy in place. Pulmonary/Chest: Clear to auscultation, without wheezes, rales, or rhonchi. No dullness to percussion.   Cardiovascular: Regular rate HISTORY:   ORDERING SYSTEM PROVIDED HISTORY: Gastric outlet obstruction. Please do   through PEG   TECHNOLOGIST PROVIDED HISTORY:   Please do UGI through PEG   Gastric outlet obstruction. Please do through PEG       COMPARISON:   None.       TECHNIQUE:   Multiple single contrast images of the junction and stomach were obtained   following the oral administration of 200 mL of Omnipaque 240 through the   indwelling percutaneous gastrostomy tube as per ordering instructions.       FLUOROSCOPY DOSE AND TYPE OR TIME AND EXPOSURES:   DAP 57.146Ntlt3       FINDINGS:   There is prompt filling of the stomach.  No extravasation of contrast from   the gastrostomy tube of stomach.       There is no evidence for gastric distension.  Normal configuration of the   stomach.  Prompt passage of contrast from stomach into nondilated duodenal   loops and a few jejunal loops.           Impression   No fluoroscopic evidence for gastric outlet obstruction.             Medical Decision Rmncon-[id number]-Jextg:       Medical Decision Making-Other:     Note:  · Labs, medications, radiologic studies were reviewed with personal review of films  · Large amounts of data were reviewed  · Discussed with nursing Staff, Discharge planner  · Infection Control and Prevention measures reviewed  · All prior entries were reviewed  · Administer medications as ordered  · Prognosis: Guarded  · Discharge planning reviewed  · Follow up as outpatient. Thank you for allowing us to participate in the care of this patient. Please call with questions.     [address], [place]  Pager: [phone] - Office: [phone]

## 2020-05-31 NOTE — PROGRESS NOTES
Pt complained of chest pain in the center of chest. Described pain as pressure and tightness. 12 lead Ekg obtained. Dr Rosa Isela Guzmán notified. Will continue to monitor.

## 2020-05-31 NOTE — PROGRESS NOTES
endoscopy (N/A, 3/17/2020); tracheostomy (N/A, 3/27/2020); Gastrostomy tube placement (N/A, 3/27/2020); tracheostomy (N/A, 4/2/2020); Upper gastrointestinal endoscopy (N/A, 5/18/2020); Upper gastrointestinal endoscopy (5/18/2020); ERCP (05/21/2020); Cholecystectomy, laparoscopic (05/22/2020); ERCP (5/21/2020); ERCP (5/21/2020); ERCP (5/21/2020); and Cholecystectomy, laparoscopic (N/A, 5/22/2020). Restrictions  Restrictions/Precautions  Restrictions/Precautions: General Precautions, Fall Risk, Up as Tolerated, Surgical Protocols  Required Braces or Orthoses?: No  Position Activity Restriction  Other position/activity restrictions: Up with assistance   Subjective   General  Patient assessed for rehabilitation services?: Yes  Family / Caregiver Present: No  Pain Assessment  Pain Assessment: 0-10  Pain Level: 8  Pain Type: Acute pain;Surgical pain  Pain Location: Abdomen  Clinical Progression: Not changed  Response to Pain Intervention: Patient Satisfied  Vital Signs  Patient Currently in Pain: Yes   Orientation     Objective    ADL  Feeding: Minimal assistance;Setup; Increased time to complete(PEG Tube Feed)  Grooming: Stand by assistance;Minimal assistance;Setup;Verbal cueing; Increased time to complete(SBA-wash face, brush teeth, Min-hold basin for rinsing)  UE Bathing: Minimal assistance;Setup;Verbal cueing; Increased time to complete(pt needs assist w/back)  LE Bathing: Maximum assistance;Setup; Increased time to complete(pt able to reach tops of legs only)  UE Dressing: Moderate assistance;Setup;Verbal cueing; Increased time to complete(w/gown)  LE Dressing: Maximum assistance;Setup(w/footies)  Toileting: Maximum assistance(ext female cath and brief)      Pt in bed upon arrival. Setup at tray table for self care (see above for LOF). Pt C/O chest pressure and pain, RN notified and EKG brought in to be done.         Bed mobility  Supine to Sit: Unable to assess (pt declined to sit eob, HOB elevated to tray table for self care)  Sit to Supine: Unable to assess   Comment: Pt states not feeling well and \"I just want to lay down\"    Plan   Plan  Times per week: 3-5x/wk   Current Treatment Recommendations: Functional Mobility Training, Endurance Training, Safety Education & Training, Patient/Caregiver Education & Training, Equipment Evaluation, Education, & procurement, Self-Care / ADL    Goals  Short term goals  Time Frame for Short term goals: by discharge, pt will  Short term goal 1: demo I in UE ADL activities with set up  Short term goal 2: demo min A for LE ADL activities with set up   Short term goal 3: demo mod A for functional transfers/ mobility with use of RW to assist with ADL/ functional activities   Short term goal 4: demo increased standing toleranceof 5+ min to assist with ADL/ functional activities   Short term goal 5: demo understanding and I use of ECWS, fall prevention and proper pursed lipped breathing tech during functional activities   Patient Goals   Patient goals : to get back to being IND     Therapy Time   Individual Concurrent Group Co-treatment   Time In  0936         Time Out  1004         Minutes  28 total tx time                 [address]/HI

## 2020-05-31 NOTE — CARE COORDINATION
Spoke with Francine Lai at [place], updated that pt will not be discharged today.  Plan remains [facility] when medically stable

## 2020-05-31 NOTE — PROGRESS NOTES
Progress note:    Patient seen and examined this AM. Patient this AM states she doesn't really remember much of what happened the night prior with hypotension and altered mental status. Feels okay. Feels a little nauseous from the thickened liquids. Doesn't remember if she has vomited. Receiving tube feeds. SBP 130s, HR 80s. Hgb stable. Tmax 38.2. Trach collar in place. SaO2 95%. Abd soft, nondistended, nontender to palpation. PEG in place without drainage. No signs of infection. Continue tube feeds and thickened liquids as tolerated. Continue protonix, reglan and carafate. Daily suppository, add colace. OK for d/c to [facility] from surgery standpoint.      Ky Espino D.O. PGY-2  Department of Surgery  Pager # [phone]  5/31/2020, 9:01 AM

## 2020-05-31 NOTE — PLAN OF CARE
Problem: SKIN INTEGRITY  Goal: Skin integrity is maintained or improved  Outcome: Ongoing   Pt skin remains intact. Redness on sacrum did not worsen. Zinc oxide was applied. Pt turned Q2H. Pt encouraged to void Q2H in advance of need. TPN and tube feed are running.

## 2020-05-31 NOTE — PROGRESS NOTES
EN    Objective Information:  · Nutrition-Focused Physical Findings: No new labs today; meds reviewed   · Wound Type: Multiple, Open Wounds  · Current Nutrition Therapies:  · Oral Diet Orders: Dysphagia Pureed (Dysphagia 1), Mildly Thick   · Oral Diet intake: 1-25%  · Oral Nutrition Supplement (ONS) Orders: None  · ONS intake: (D/C ONS as not appropriate liquid consistency)  · Parenteral Nutrition Orders:  · Type and Formula: 2-in-1 Custom   · Lipids: None  · Rate/Volume: 60 mL/hr (1440 mL/day)  · Duration: Continuous  · Current PN Order Provides: 1405 kcals, 75 gm protein  · Goal PN Orders Provides: 325 gms dextrose + 75 gms AA ~> 1405 kcals, 75 gms protein   · Anthropometric Measures:  · Ht: 5' 3\" (160 cm)   · Current Body Wt: 186 lb (84.4 kg)  · Admission Body Wt: 181 lb (82.1 kg)  · Usual Body Wt: 158 lb (71.7 kg)(per pt )  · % Weight Change:  ,  179-186 lbs over 3 mo per EMR   · Ideal Body Wt: 115 lb (52.2 kg), % Ideal Body 157% adm/ideal  · BMI Classification: BMI 30.0 - 34.9 Obese Class I    Nutrition Interventions:   Continue current diet, Continue current ONS, Continue Parenteral Nutrition  Continued Inpatient Monitoring, Education Not Indicated    Nutrition Evaluation:   · Evaluation: Goal achieved   · Goals: meet % of estimated nutrition needs with nutrition support/oral diet   · Monitoring: Nutrition Progression, Meal Intake, TF Intake, PN Intake, Diet Tolerance, TF Tolerance, PN Tolerance, Skin Integrity, Wound Healing, I&O, Weight, Pertinent Labs, Monitor Bowel Function      Electronically signed by Deedee Guerra RD, LD on 5/31/20 at 11:21 AM EDT    Contact Number: [phone]

## 2020-05-31 NOTE — PROGRESS NOTES
Labs:  Hematology:  Recent Labs     05/29/20  0458 05/30/20  0501 05/31/20  0723   WBC 6.2 5.4 8.3   RBC 2.93* 2.78* 2.81*   HGB 7.7* 7.3* 7.5*   HCT 25.9* 25.6* 24.1*   MCV 88.4 92.1 85.8   MCH 26.3 26.3 26.7   MCHC 29.7 28.5 31.1   RDW 18.5* 18.6* 18.5*    310 352   MPV 11.4 11.4 11.6     Chemistry:  Recent Labs     05/29/20  0458 05/30/20  0626 05/30/20  1221   * 134*  --    K 4.5 4.4  --    CL 98 100  --    CO2 23 22  --    GLUCOSE 109* 117*  --    BUN 16 18  --    CREATININE 0.65 0.66  --    MG 1.9 2.1  --    ANIONGAP 11 12  --    LABGLOM >60 >60  --    GFRAA >60 >60  --    CALCIUM 8.8 8.7  --    PHOS 3.2  --   --    TROPHS  --  63* 59*   LACTACIDWB  --  1.0  --      Recent Labs     05/29/20  0719 05/29/20  1146 05/29/20  2032 05/30/20  0504 05/30/20  0535 05/30/20  0608 05/30/20  0626   AMMONIA  --   --   --   --   --   --  49   POCGLU 120* 110* 113* 124* 104 114*  --      ABG:  Lab Results   Component Value Date    POCPH 7.437 05/30/2020    POCPCO2 42.3 05/30/2020    POCPO2 129.9 05/30/2020    POCHCO3 28.6 05/30/2020    NBEA NOT REPORTED 05/30/2020    PBEA 4 05/30/2020    VWL6OIX 30 05/30/2020    PJOQ4TWA 99 05/30/2020    FIO2 40.0 05/30/2020     Lab Results   Component Value Date/Time    SPECIAL NOT REPORTED 05/26/2020 04:32 PM     Lab Results   Component Value Date/Time    CULTURE ENTEROCOCCUS FAECALIS 10 to 50,000 CFU/ML (A) 05/26/2020 04:32 PM       Radiology:  Reese Blount Abdomen (kub) (single Ap View)    Result Date: 5/24/2020  Nonspecific abdominal bowel gas pattern. Mild gaseous distention of several colonic loops. Fl Ercp Biliary S&i    Result Date: 5/21/2020  Intraprocedural fluoroscopic spot images as above. See separate procedure report for more information. Xr Chest Portable    Result Date: 5/26/2020  Coarse reticular interstitial opacities remain in place. Retrocardiac opacity.   Possible pneumonia with or without pleural effusion     Xr Chest Portable    Result Date:

## 2020-06-01 LAB
ABSOLUTE EOS #: 0.43 K/UL (ref 0–0.4)
ABSOLUTE IMMATURE GRANULOCYTE: 0.17 K/UL (ref 0–0.3)
ABSOLUTE LYMPH #: 0.85 K/UL (ref 1–4.8)
ABSOLUTE MONO #: 0.68 K/UL (ref 0.1–0.8)
ANION GAP SERPL CALCULATED.3IONS-SCNC: 13 MMOL/L (ref 9–17)
BASOPHILS # BLD: 0 % (ref 0–2)
BASOPHILS ABSOLUTE: 0 K/UL (ref 0–0.2)
BUN BLDV-MCNC: 16 MG/DL (ref 8–23)
BUN/CREAT BLD: ABNORMAL (ref 9–20)
CALCIUM SERPL-MCNC: 9.2 MG/DL (ref 8.6–10.4)
CHLORIDE BLD-SCNC: 99 MMOL/L (ref 98–107)
CO2: 23 MMOL/L (ref 20–31)
CREAT SERPL-MCNC: 0.57 MG/DL (ref 0.5–0.9)
DIFFERENTIAL TYPE: ABNORMAL
EKG ATRIAL RATE: 89 BPM
EKG P AXIS: 39 DEGREES
EKG P-R INTERVAL: 130 MS
EKG Q-T INTERVAL: 366 MS
EKG QRS DURATION: 80 MS
EKG QTC CALCULATION (BAZETT): 445 MS
EKG R AXIS: 32 DEGREES
EKG T AXIS: 23 DEGREES
EKG VENTRICULAR RATE: 89 BPM
EOSINOPHILS RELATIVE PERCENT: 5 % (ref 1–4)
GFR AFRICAN AMERICAN: >60 ML/MIN
GFR NON-AFRICAN AMERICAN: >60 ML/MIN
GFR SERPL CREATININE-BSD FRML MDRD: ABNORMAL ML/MIN/{1.73_M2}
GFR SERPL CREATININE-BSD FRML MDRD: ABNORMAL ML/MIN/{1.73_M2}
GLUCOSE BLD-MCNC: 116 MG/DL (ref 65–105)
GLUCOSE BLD-MCNC: 117 MG/DL (ref 65–105)
GLUCOSE BLD-MCNC: 119 MG/DL (ref 65–105)
GLUCOSE BLD-MCNC: 127 MG/DL (ref 70–99)
GLUCOSE BLD-MCNC: 130 MG/DL (ref 65–105)
HCT VFR BLD CALC: 26.1 % (ref 36.3–47.1)
HEMOGLOBIN: 7.6 G/DL (ref 11.9–15.1)
IMMATURE GRANULOCYTES: 2 %
LYMPHOCYTES # BLD: 10 % (ref 24–44)
MAGNESIUM: 1.9 MG/DL (ref 1.6–2.6)
MCH RBC QN AUTO: 26 PG (ref 25.2–33.5)
MCHC RBC AUTO-ENTMCNC: 29.1 G/DL (ref 28.4–34.8)
MCV RBC AUTO: 89.4 FL (ref 82.6–102.9)
MONOCYTES # BLD: 8 % (ref 1–7)
MORPHOLOGY: ABNORMAL
NRBC AUTOMATED: 0 PER 100 WBC
PDW BLD-RTO: 18.6 % (ref 11.8–14.4)
PHOSPHORUS: 4.2 MG/DL (ref 2.6–4.5)
PLATELET # BLD: 365 K/UL (ref 138–453)
PLATELET ESTIMATE: ABNORMAL
PMV BLD AUTO: 11 FL (ref 8.1–13.5)
POTASSIUM SERPL-SCNC: 4.6 MMOL/L (ref 3.7–5.3)
RBC # BLD: 2.92 M/UL (ref 3.95–5.11)
RBC # BLD: ABNORMAL 10*6/UL
SEG NEUTROPHILS: 75 % (ref 36–66)
SEGMENTED NEUTROPHILS ABSOLUTE COUNT: 6.37 K/UL (ref 1.8–7.7)
SODIUM BLD-SCNC: 135 MMOL/L (ref 135–144)
WBC # BLD: 8.5 K/UL (ref 3.5–11.3)
WBC # BLD: ABNORMAL 10*3/UL

## 2020-06-01 PROCEDURE — 36415 COLL VENOUS BLD VENIPUNCTURE: CPT

## 2020-06-01 PROCEDURE — 6360000002 HC RX W HCPCS: Performed by: SURGERY

## 2020-06-01 PROCEDURE — 94640 AIRWAY INHALATION TREATMENT: CPT

## 2020-06-01 PROCEDURE — 6370000000 HC RX 637 (ALT 250 FOR IP): Performed by: SURGERY

## 2020-06-01 PROCEDURE — 6370000000 HC RX 637 (ALT 250 FOR IP): Performed by: FAMILY MEDICINE

## 2020-06-01 PROCEDURE — 6370000000 HC RX 637 (ALT 250 FOR IP): Performed by: STUDENT IN AN ORGANIZED HEALTH CARE EDUCATION/TRAINING PROGRAM

## 2020-06-01 PROCEDURE — 97110 THERAPEUTIC EXERCISES: CPT

## 2020-06-01 PROCEDURE — 2580000003 HC RX 258: Performed by: SURGERY

## 2020-06-01 PROCEDURE — 82947 ASSAY GLUCOSE BLOOD QUANT: CPT

## 2020-06-01 PROCEDURE — 83735 ASSAY OF MAGNESIUM: CPT

## 2020-06-01 PROCEDURE — 93010 ELECTROCARDIOGRAM REPORT: CPT | Performed by: INTERNAL MEDICINE

## 2020-06-01 PROCEDURE — 1200000000 HC SEMI PRIVATE

## 2020-06-01 PROCEDURE — 97530 THERAPEUTIC ACTIVITIES: CPT

## 2020-06-01 PROCEDURE — 2500000003 HC RX 250 WO HCPCS: Performed by: FAMILY MEDICINE

## 2020-06-01 PROCEDURE — 80048 BASIC METABOLIC PNL TOTAL CA: CPT

## 2020-06-01 PROCEDURE — 2700000000 HC OXYGEN THERAPY PER DAY

## 2020-06-01 PROCEDURE — 6360000002 HC RX W HCPCS: Performed by: FAMILY MEDICINE

## 2020-06-01 PROCEDURE — 99232 SBSQ HOSP IP/OBS MODERATE 35: CPT | Performed by: INTERNAL MEDICINE

## 2020-06-01 PROCEDURE — 85025 COMPLETE CBC W/AUTO DIFF WBC: CPT

## 2020-06-01 PROCEDURE — 6370000000 HC RX 637 (ALT 250 FOR IP): Performed by: HOSPITALIST

## 2020-06-01 PROCEDURE — 99239 HOSP IP/OBS DSCHRG MGMT >30: CPT | Performed by: FAMILY MEDICINE

## 2020-06-01 PROCEDURE — C9113 INJ PANTOPRAZOLE SODIUM, VIA: HCPCS | Performed by: SURGERY

## 2020-06-01 PROCEDURE — 84100 ASSAY OF PHOSPHORUS: CPT

## 2020-06-01 RX ORDER — MIDODRINE HYDROCHLORIDE 5 MG/1
5 TABLET ORAL 3 TIMES DAILY PRN
Status: ON HOLD | DISCHARGE
Start: 2020-06-01 | End: 2020-12-16 | Stop reason: HOSPADM

## 2020-06-01 RX ADMIN — TRAZODONE HYDROCHLORIDE 100 MG: 100 TABLET ORAL at 20:16

## 2020-06-01 RX ADMIN — FENTANYL CITRATE 50 MCG: 50 INJECTION, SOLUTION INTRAMUSCULAR; INTRAVENOUS at 04:01

## 2020-06-01 RX ADMIN — LORAZEPAM 0.5 MG: 2 INJECTION INTRAMUSCULAR; INTRAVENOUS at 01:17

## 2020-06-01 RX ADMIN — TAMSULOSIN HYDROCHLORIDE 0.4 MG: 0.4 CAPSULE ORAL at 08:21

## 2020-06-01 RX ADMIN — METOCLOPRAMIDE 10 MG: 5 INJECTION, SOLUTION INTRAMUSCULAR; INTRAVENOUS at 23:13

## 2020-06-01 RX ADMIN — OXYCODONE HYDROCHLORIDE AND ACETAMINOPHEN 1 TABLET: 5; 325 TABLET ORAL at 00:12

## 2020-06-01 RX ADMIN — MIDODRINE HYDROCHLORIDE 10 MG: 5 TABLET ORAL at 18:12

## 2020-06-01 RX ADMIN — ONDANSETRON 4 MG: 2 INJECTION INTRAMUSCULAR; INTRAVENOUS at 12:46

## 2020-06-01 RX ADMIN — AMITRIPTYLINE HYDROCHLORIDE 25 MG: 25 TABLET, FILM COATED ORAL at 15:51

## 2020-06-01 RX ADMIN — AMITRIPTYLINE HYDROCHLORIDE 25 MG: 25 TABLET, FILM COATED ORAL at 20:16

## 2020-06-01 RX ADMIN — SUCRALFATE 1 G: 1 TABLET ORAL at 22:02

## 2020-06-01 RX ADMIN — ONDANSETRON 4 MG: 2 INJECTION INTRAMUSCULAR; INTRAVENOUS at 20:10

## 2020-06-01 RX ADMIN — MIDODRINE HYDROCHLORIDE 10 MG: 5 TABLET ORAL at 12:49

## 2020-06-01 RX ADMIN — IPRATROPIUM BROMIDE AND ALBUTEROL SULFATE 1 AMPULE: .5; 3 SOLUTION RESPIRATORY (INHALATION) at 02:57

## 2020-06-01 RX ADMIN — PANTOPRAZOLE SODIUM 40 MG: 40 INJECTION, POWDER, LYOPHILIZED, FOR SOLUTION INTRAVENOUS at 21:10

## 2020-06-01 RX ADMIN — CLONAZEPAM 0.25 MG: 0.5 TABLET ORAL at 08:21

## 2020-06-01 RX ADMIN — OXYCODONE HYDROCHLORIDE AND ACETAMINOPHEN 1 TABLET: 5; 325 TABLET ORAL at 20:10

## 2020-06-01 RX ADMIN — FENTANYL CITRATE 50 MCG: 50 INJECTION, SOLUTION INTRAMUSCULAR; INTRAVENOUS at 08:21

## 2020-06-01 RX ADMIN — I.V. FAT EMULSION 100 ML: 20 EMULSION INTRAVENOUS at 18:10

## 2020-06-01 RX ADMIN — Medication 10 ML: at 12:49

## 2020-06-01 RX ADMIN — ONDANSETRON 4 MG: 2 INJECTION INTRAMUSCULAR; INTRAVENOUS at 07:02

## 2020-06-01 RX ADMIN — POTASSIUM CHLORIDE: 2 INJECTION, SOLUTION, CONCENTRATE INTRAVENOUS at 17:53

## 2020-06-01 RX ADMIN — ENOXAPARIN SODIUM 40 MG: 40 INJECTION SUBCUTANEOUS at 08:22

## 2020-06-01 RX ADMIN — BUSPIRONE HYDROCHLORIDE 20 MG: 10 TABLET ORAL at 20:16

## 2020-06-01 RX ADMIN — FUROSEMIDE 20 MG: 20 TABLET ORAL at 08:21

## 2020-06-01 RX ADMIN — ONDANSETRON 4 MG: 2 INJECTION INTRAMUSCULAR; INTRAVENOUS at 00:12

## 2020-06-01 RX ADMIN — LORAZEPAM 0.5 MG: 2 INJECTION INTRAMUSCULAR; INTRAVENOUS at 11:04

## 2020-06-01 RX ADMIN — FENTANYL CITRATE 50 MCG: 50 INJECTION, SOLUTION INTRAMUSCULAR; INTRAVENOUS at 12:46

## 2020-06-01 RX ADMIN — SUCRALFATE 1 G: 1 TABLET ORAL at 08:21

## 2020-06-01 RX ADMIN — BUSPIRONE HYDROCHLORIDE 20 MG: 10 TABLET ORAL at 08:21

## 2020-06-01 RX ADMIN — METOCLOPRAMIDE 10 MG: 5 INJECTION, SOLUTION INTRAMUSCULAR; INTRAVENOUS at 11:04

## 2020-06-01 RX ADMIN — METOCLOPRAMIDE 10 MG: 5 INJECTION, SOLUTION INTRAMUSCULAR; INTRAVENOUS at 05:04

## 2020-06-01 RX ADMIN — SODIUM CHLORIDE, PRESERVATIVE FREE 10 ML: 5 INJECTION INTRAVENOUS at 08:23

## 2020-06-01 RX ADMIN — AMITRIPTYLINE HYDROCHLORIDE 25 MG: 25 TABLET, FILM COATED ORAL at 08:21

## 2020-06-01 RX ADMIN — PANTOPRAZOLE SODIUM 40 MG: 40 INJECTION, POWDER, LYOPHILIZED, FOR SOLUTION INTRAVENOUS at 08:20

## 2020-06-01 RX ADMIN — Medication 10 ML: at 12:47

## 2020-06-01 RX ADMIN — ESCITALOPRAM OXALATE 20 MG: 10 TABLET ORAL at 08:21

## 2020-06-01 RX ADMIN — MIDODRINE HYDROCHLORIDE 10 MG: 5 TABLET ORAL at 08:21

## 2020-06-01 RX ADMIN — SUCRALFATE 1 G: 1 TABLET ORAL at 12:49

## 2020-06-01 RX ADMIN — FENTANYL CITRATE 50 MCG: 50 INJECTION, SOLUTION INTRAMUSCULAR; INTRAVENOUS at 15:50

## 2020-06-01 RX ADMIN — Medication 10 ML: at 08:22

## 2020-06-01 RX ADMIN — QUETIAPINE FUMARATE 100 MG: 100 TABLET ORAL at 20:16

## 2020-06-01 RX ADMIN — BUSPIRONE HYDROCHLORIDE 20 MG: 10 TABLET ORAL at 15:51

## 2020-06-01 RX ADMIN — CLONAZEPAM 0.25 MG: 0.5 TABLET ORAL at 20:15

## 2020-06-01 RX ADMIN — METOCLOPRAMIDE 10 MG: 5 INJECTION, SOLUTION INTRAMUSCULAR; INTRAVENOUS at 17:53

## 2020-06-01 RX ADMIN — LORAZEPAM 0.5 MG: 2 INJECTION INTRAMUSCULAR; INTRAVENOUS at 18:34

## 2020-06-01 ASSESSMENT — PAIN DESCRIPTION - LOCATION: LOCATION: ABDOMEN

## 2020-06-01 ASSESSMENT — PAIN SCALES - GENERAL
PAINLEVEL_OUTOF10: 7
PAINLEVEL_OUTOF10: 3
PAINLEVEL_OUTOF10: 7
PAINLEVEL_OUTOF10: 7
PAINLEVEL_OUTOF10: 8
PAINLEVEL_OUTOF10: 7
PAINLEVEL_OUTOF10: 6
PAINLEVEL_OUTOF10: 9

## 2020-06-01 ASSESSMENT — PAIN DESCRIPTION - ONSET: ONSET: ON-GOING

## 2020-06-01 ASSESSMENT — PAIN SCALES - WONG BAKER: WONGBAKER_NUMERICALRESPONSE: 4

## 2020-06-01 ASSESSMENT — PAIN DESCRIPTION - PAIN TYPE
TYPE: ACUTE PAIN
TYPE: CHRONIC PAIN

## 2020-06-01 ASSESSMENT — PAIN DESCRIPTION - DESCRIPTORS
DESCRIPTORS: ACHING
DESCRIPTORS: DISCOMFORT

## 2020-06-01 ASSESSMENT — PAIN DESCRIPTION - FREQUENCY: FREQUENCY: INTERMITTENT

## 2020-06-01 ASSESSMENT — PAIN DESCRIPTION - ORIENTATION: ORIENTATION: MID

## 2020-06-01 ASSESSMENT — PAIN - FUNCTIONAL ASSESSMENT: PAIN_FUNCTIONAL_ASSESSMENT: ACTIVITIES ARE NOT PREVENTED

## 2020-06-01 NOTE — DISCHARGE SUMMARY
Niurka Gamble [address]    Discharge Summary     Patient ID: Mendoza Franklin  :  1945   MRN: [id number]     ACCOUNT:  [de-identified]   Patient's PCP: Connie Haq MD  Admit Date: 2020   Discharge Date: 2020     Length of Stay: 20  Code Status:  Full Code  Admitting Physician: Ellard Schirmer, MD  Discharge Physician: Ellard Schirmer, MD     Active Discharge Diagnoses:     Hospital Problem Lists:  Principal Problem:    Gastric outlet obstruction  Active Problems:    MVP (mitral valve prolapse)    Dysphagia    Centrilobular emphysema (HCC)    Fever    Recurrent UTI    Tracheostomy in place [facility])    H/O gastric ulcer    Hyperlipidemia    Hypertension    Tobacco abuse    Chronic respiratory failure with hypoxia ([address]    Status post insertion of percutaneous endoscopic gastrostomy (PEG) tube (HCC)    S/P Nissen fundoplication (without gastrostomy tube) procedure    Paralytic ileus ([address]    Elevated liver enzymes  Resolved Problems:    * No resolved hospital problems. *      Admission Condition:  poor     Discharged Condition: stable    Hospital Stay:     Hospital Course:      Jose Corral a 76 y.o. Non-/non  female who presents with No chief complaint on file.   and is admitted to the hospital for the management of SBO  Patient transferred to our facility from [facility] with possibility of bowel obstruction, 2 months ago she had prolonged extensive hospital stay, at the end of February had a Nissen fundoplication with possible aspiration issues and was admitted and stayed in the hospital for 37 days during that time she was intubated twice had hematemesis and had EGD that showed gastric ulcer and was seen by a specialist and after long course she was successfully transitioned to a trach and PEG and discharged to [facility] on .   This time the patient has been having increasing abdominal pain nausea and vomiting over that has been worsening CONSULT TO INFECTIOUS DISEASES  IP CONSULT TO UROLOGY  IP CONSULT TO DIETITIAN  IP CONSULT TO GI  IP CONSULT TO SOCIAL WORK  IP CONSULT TO CRITICAL CARE      The patient was seen and examined on day of discharge and this discharge summary is in conjunction with any daily progress note from day of discharge.     Discharge plan:     Disposition: Long-Term Acute Care    Physician Follow Up:     Nasir Headley MD  [address] [facility] [address]  [address], [address], [place] [address]  [address] [facility] [address]  [phone]    Schedule an appointment as soon as possible for a visit         Requiring Further Evaluation/Follow Up POST HOSPITALIZATION/Incidental Findings:     Diet: Dysphagia diet/tube feeds/TPN    Activity: As tolerated    Instructions to Patient:     Discharge Medications:      Medication List      CHANGE how you take these medications    metoprolol succinate 25 MG extended release tablet  Commonly known as:  TOPROL XL  Take 1 tablet by mouth daily  What changed:  when to take this        CONTINUE taking these medications    Acetaminophen 500 MG Caps     amitriptyline 25 MG tablet  Commonly known as:  ELAVIL     busPIRone 10 MG tablet  Commonly known as:  BUSPAR  Take 2 tablets by mouth 3 times daily     calcium carbonate 500 MG chewable tablet  Commonly known as:  TUMS     clonazePAM 0.5 MG tablet  Commonly known as:  KLONOPIN     docusate 50 MG/5ML liquid  Commonly known as:  COLACE  10 mLs by Per G Tube route 2 times daily     escitalopram 20 MG tablet  Commonly known as:  Lexapro  Take 1 tablet by mouth daily     furosemide 20 MG tablet  Commonly known as:  Lasix  Take 1 tablet by mouth daily     gabapentin 300 MG capsule  Commonly known as:  NEURONTIN     metoclopramide 5 MG/ML injection  Commonly known as:  REGLAN  Infuse 2 mLs intravenously every 6 hours     Oyster Shell 500 MG Tabs     pantoprazole 40 MG injection  Commonly known as:  PROTONIX  Infuse 40 mg intravenously 2 times daily     QUEtiapine 100 MG tablet  Commonly known as:  SEROQUEL  Take 1 tablet by mouth nightly     RA Vitamin D-3 50 MCG (2000 UT) Caps  Generic drug:  Cholecalciferol     simvastatin 40 MG tablet  Commonly known as:  ZOCOR     sucralfate 1 GM tablet  Commonly known as:  CARAFATE  Take 1 tablet by mouth 4 times daily     traZODone 100 MG tablet  Commonly known as:  DESYREL  Take 1 tablet by mouth nightly        STOP taking these medications    lubrifresh P.M. ophthalmic ointment     miconazole 2 % powder  Commonly known as:  MICOTIN     mineral oil liquid           Where to Get Your Medications      Information about where to get these medications is not yet available    Ask your nurse or doctor about these medications  · metoprolol succinate 25 MG extended release tablet         No discharge procedures on file. Time Spent on discharge is  35 mins in patient examination, evaluation, counseling as well as medication reconciliation, prescriptions for required medications, discharge plan and follow up. Electronically signed by   Tahmina Haq MD  6/1/2020  10:59 AM      Thank you Dr. Batsheva Heaton MD for the opportunity to be involved in this patient's care.

## 2020-06-01 NOTE — PROGRESS NOTES
Physical Therapy  Facility/Department: [facility] RENAL//MED SURG  Daily Treatment Note  NAME: Vin Abel  : 1945  MRN: [id number]    Date of Service: 2020    Discharge Recommendations:  Patient would benefit from continued therapy after discharge   PT Equipment Recommendations  Equipment Needed: No    Assessment   Body structures, Functions, Activity limitations: Decreased functional mobility ; Decreased endurance;Decreased strength;Decreased balance;Decreased ROM; Increased pain  Assessment: Pt c/o pain and fatigue and increased pain with mobility. Pt requiring MaxA for bed mobility, functional mobility limited due to nausea and vomiting this date. Pt would benefit from continued skilled physical therapy to address functional mobility deficits and return pt to prior level of function. Prognosis: Guarded  PT Education: Transfer Training;General Safety;Gait Training;Functional Mobility Training;Energy Conservation  Patient Education: Importance of mobility and ROM; bed mobility for pressure relief and promote skin integrity  REQUIRES PT FOLLOW UP: Yes  Activity Tolerance  Activity Tolerance: Patient limited by fatigue;Patient limited by pain; Patient limited by endurance;Treatment limited secondary to medical complications (free text)  Activity Tolerance: Limited due to nausea and vomiting     Patient Diagnosis(es): There were no encounter diagnoses. has a past medical history of Anxiety, Arthritis, Back pain, Bronchitis, Caffeine use, Carpal tunnel syndrome, Cataracts, bilateral, Constipation, Depression, Diarrhea, GERD (gastroesophageal reflux disease), H/O gastric ulcer, Hyperlipidemia, Hypertension, Mumps, MVP (mitral valve prolapse), Recurrent UTI, Sinusitis, Tracheostomy in place [facility]), Ulcerative esophagitis, and Wellness examination. has a past surgical history that includes Hysterectomy; total nephrectomy; Tonsillectomy; Appendectomy; Cystoscopy;  Ovary removal; Tubal ligation; rhinoplasty; floated, pillow to prevent adduction of bilateral LEs. FDS donned of RLE, to prevent breakdown, unable to don on LLE due to pt report of significant LE pain. RN notified of pt's vomiting and present upon writer's exit. Transfers  Comment: Pt refused out of bed mobility this date due to pain. Ambulation  Ambulation?: No  More Ambulation?: No  Stairs/Curb  Stairs?: No     Balance  Comments: Unable to assess- activity tolerance limited this date due to nausea and vomiting    Supine Exercises: Ankle Pumps, Heel Slides, Hip ABD/ADD, Hip IR/ER, Quad Sets. Reps: x10 AAROM  Bilateral gastroc stretch 3x20s  Comments: Unable to obtain ankle DF on RLE due to tightness. Goals  Short term goals  Time Frame for Short term goals: 10 visits  Short term goal 1: transfers with min assist  Short term goal 2: amb 25 ft with a RW x min assist  Short term goal 3: bed mobility activity with min assist   Short term goal 4: exercise program x SBGA  Patient Goals   Patient goals : Return home    Plan    Plan  Times per week: 3-5 visits weekly  Times per day: Daily  Current Treatment Recommendations: Strengthening, Balance Training, Endurance Training, Functional Mobility Training, Safety Education & Training, ROM, Transfer Training, Wheelchair Mobility Training, Pain Management, Home Exercise Program, Patient/Caregiver Education & Training, Equipment Evaluation, Education, & procurement, Positioning  Safety Devices  Type of devices: Bed alarm in place, Call light within reach, Patient at risk for falls, Left in bed, Nurse notified, All fall risk precautions in place(FDS doffed off RLE per wear schedule.  Attempted to place on LLE, pt unable to tolerate due to pain. )  Restraints  Initially in place: No     Therapy Time   Individual Concurrent Group Co-treatment   Time In 1515         Time Out 1538         Minutes [address] [place] [address], PT

## 2020-06-01 NOTE — PROGRESS NOTES
Niurka Gamble 19    Progress Note    6/1/2020    10:59 AM    Name:   Ge Reno  MRN:     [id number]     Acct:      [de-identified]   Room:   [address] [place] [address] Day:  21  Admit Date:  5/12/2020  3:19 PM    PCP:   Felicia Herman MD  Code Status:  Full Code    Subjective:     C/C: N/V/ TF intolerance  Interval History Status: improved    Patient seen and examined at bedside,   Feels better and BP is better   No vomiting   Awake and conversant  Does not like the food   Patient vitals, labs and all providers notes were reviewed,from overnight shift and morning updates were noted and discussed with the nurse    Brief History:        Valeria Giang a 76 y.o. Non-/non  female who presents with No chief complaint on file.   and is admitted to the hospital for the management of SBO  Patient transferred to our facility from [facility] with possibility of bowel obstruction, 2 months ago she had prolonged extensive hospital stay, at the end of February had a Nissen fundoplication with possible aspiration issues and was admitted and stayed in the hospital for 37 days during that time she was intubated twice had hematemesis and had EGD that showed gastric ulcer and was seen by a specialist and after long course she was successfully transitioned to a trach and PEG and discharged to [facility] on 4/6. This time the patient has been having increasing abdominal pain nausea and vomiting over that has been worsening since she was discharged over at [facility]  Patient had ongoing issues since then, had SBO that resolved , since then she has an issue  with tolerating TF,  a PICC line placed on 4/25 at [facility] and was startd TPN , given she had persistent N/V, TF held and  PEG tube has been down to gravity for the last couple of days.   She also has been treated for UTI and finishing a course of Zyvox ordered by ID.     Anxiety continues overnight  VSS- modified barium COVID swab  esogram possible if warranted by VSS; and possible peg tube eval  Fevers overnight prompting blood cultures x2 urinalysis with culture all pending at current time  Passed swallow study  UGI series show gastric outlet obstruction,GI consult added by surgery    Review of Systems:     Constitutional:  negative for chills, fevers, sweats  Respiratory:  negative for cough, dyspnea on exertion, shortness of breath, wheezing  Cardiovascular:  negative for chest pain, chest pressure/discomfort, lower extremity edema, palpitations  Gastrointestinal:  negative for abdominal pain, constipation, diarrhea, nausea, vomiting  Neurological:  negative for dizziness, headache    Medications: Allergies:     Allergies   Allergen Reactions    Prednisone Anxiety    Compazine [Prochlorperazine Maleate]     Penicillin V Potassium     Penicillins Other (See Comments)     shock       Current Meds:   Scheduled Meds:    docusate sodium  100 mg Oral Daily    clonazePAM  0.25 mg Oral BID    sodium chloride  250 mL Intravenous Once    sodium chloride  500 mL Intravenous Once    midodrine  10 mg Oral TID WC    sodium chloride flush  10 mL Intravenous 2 times per day    sodium chloride flush  10 mL Intravenous 2 times per day    enoxaparin  40 mg Subcutaneous Daily    pantoprazole  40 mg Intravenous BID    And    sodium chloride (PF)  10 mL Intravenous BID    sucralfate  1 g Oral 4 times per day    [Held by provider] metoprolol succinate  25 mg Oral Daily    furosemide  20 mg Oral Daily    bisacodyl  10 mg Rectal Daily    QUEtiapine  100 mg Oral Nightly    traZODone  100 mg Oral Nightly    tamsulosin  0.4 mg Oral Daily    metoclopramide  10 mg Intravenous Q6H    sodium chloride flush  10 mL Intravenous 2 times per day    amitriptyline  25 mg Oral TID    busPIRone  20 mg Oral TID    escitalopram  20 mg Oral Daily    [Held by provider] gabapentin  300 mg Oral TID     Continuous Infusions:    PN-Adult Add Midodrine tid    S/p hiatal hernia repair with Mark  fundoplication ( early in 4/4166)       Gastric outlet obstruction/ pylorus paralysis: S/P pylorotomy on 5/22, UGI FT with no leak or obstruction     Status post cholecystectomy per surgery on 5/22     Dilated CBD on MRCP : S/P ERCP 5/21 with sphincterotomy, balloon sweeping and stent placement for CBD distal stricture    Severe esophagitis/gastritis and stenotic pylorus on EGD: S/p balloon dilatation    Recurrent UTI: No evidence of current UTI    At risk of malnutrition: Continue TPN for now along with dysphagia diet   She is still with poor tolerance     Fever :  Source unclear yet, given the diarrhea ID added C diff.  Also I will repeat CXR      Will continue with the same  Fluid rate      Chronic respiratory failure with hypoxia/tracheostomy in place: Trach care per respiratory therapy, continue supplemental oxygen.     Urinary retention: Patient refusing intermittent cath, urology consulted recommends to keep the Chu in, Flomax added   WiIll be discharged with chu   I discussed with the patient and encouraged her to agree to removing it      COPD: Continue inhalers      History of bipolar/anxiety: Continue psych medications, continue Ativan for anxiety     HTN: continue home medications     HPL: continue home medications     DM2: Continue diabetes home medications , insulin sliding scale, hypoglycemia protocol     DVT prophylaxis       Discussed with the patient and the nurse          Joseph Portillo MD  6/1/2020  10:59 AM

## 2020-06-01 NOTE — PROGRESS NOTES
for esophagram to evaluate the hiatal hernia repair. If the esophagram appears normal--> we will plan for a PEGogram and evaluate whether the stomach is emptying appropriately. Underwent cholecystectomy and pyloroplasty 5-22-20    CURRENT EVALUATION: 6/1/2020     Afebrile  VS stable    Awake,more responsive  Tolerating tube feeds better but still experiencing vomiting    Abdomen mildly distended, minimally tender  Has normal bowel sounds  Abdominal incision clean and dry  G tube site clean and dry    Tracheostomy capped. Tracheal secretions scant, whitish. Patient is possibly silently aspirating which could account for the low grade fevers  CXR 5-30-30: No changes. Residual vascular congestion and Lt basilar opacity.     Patient to return to [facility] when stable,  where we will continue to follow.     Pertinent Labs and X rays reviewed: 6/1/2020       BUN: 24-->19-->21->31-->22-->16  Cr. :0.69-->1.0-->0.79->0.76-->0.81-->0.65-->0.57-->0.66-->0.57     WBC: 7.7-->13.5->8.9-->7.4-->8.4-->6.2-->5.4-->8.5  Hb:8.8-->9.0->9.5-->8.8-->9.0-->8.3-->7.6  Plat: 179-->339-->238-->262-->249-->390-->365     Cultures:  Urine:  · 4-11-20: No growth  · 5-26-20: E faecalis 10-50K  Blood:  · 4-11-20: No growth  · 5-3 x 2 no growth  · 5-14 x 2 no growth   · 5-23 x 2 no growth    Sputum :  ·    Wound:    COVID tests:  · 5-14 COVID negative   · 5-20 COVID negative      · CXR 4-11-20: cardiomegaly, lungs clear  · KUB 4-13-20; Normal  · CT Abdomen 4-13-20: Few gallstones with mild GB distension. Otherwise abdomen normal.  · Bilateral pulmonary basilar infiltrates. · Abd XR 4-27-20: Dilated loops of bowel. Ileus  · CXR 4-27-20: Vascular congestion, small Lt pleural effusion  · CXR 5-6-20:  Cardiomegaly with Rt side infiltrate  · CXR 5-8-20: Cardiomegaly with minimal RUL infiltrate  ·  CXR 5-13-20: Moderate edema improved  · CXR 5-24-20: LLL opacity. Improvement prior RUL infiltrate  · CXR 5-26-20: vascular congestion.  Worsening LLL GASTRIC FUNDOPLICATION N/A 4/71/1557    XI LAPAROSCOPIC ROBOTIC HIATAL HERNIA REPAIR, CHERYL FUNDOPLICATION performed by Mackenzie Taylor MD at [facility] N/A 3/27/2020    EGD PEG TUBE PLACEMENT performed by Mackenzie Taylor MD at [address] [facility] [place]. CATH POWER PICC TRIPLE  3/4/2020         HYSTERECTOMY      Abdominal    JOINT REPLACEMENT Right     right hip    OVARY REMOVAL      RHINOPLASTY      TONSILLECTOMY      TOTAL HIP ARTHROPLASTY      TOTAL NEPHRECTOMY      atrophic from infections, age 13   Kylie Rhianna TRACHEOSTOMY N/A 3/27/2020    **ADD ON **WANTS 10:00AM **TRACHEOTOMY performed by Mackenzie Taylor MD at [facility] 4/2/2020    TRACHEOTOMY EXCHANGE performed by Mackenzie Taylor MD at [facility] 3/17/2020    BEDSIDE EGD ESOPHAGOGASTRODUODENOSCOPY (ICU) performed by Mackenzie Taylor MD at [facility]    UPPER GASTROINTESTINAL ENDOSCOPY N/A 5/18/2020    EGD BIOPSY performed by Elida Corrales MD at [address] [place] [address]  5/18/2020    EGD DILATION BALLOON performed by Elida Corrales MD at [facility]       Medications:      docusate sodium  100 mg Oral Daily    clonazePAM  0.25 mg Oral BID    sodium chloride  250 mL Intravenous Once    sodium chloride  500 mL Intravenous Once    midodrine  10 mg Oral TID WC    sodium chloride flush  10 mL Intravenous 2 times per day    sodium chloride flush  10 mL Intravenous 2 times per day    enoxaparin  40 mg Subcutaneous Daily    pantoprazole  40 mg Intravenous BID    And    sodium chloride (PF)  10 mL Intravenous BID    sucralfate  1 g Oral 4 times per day    [Held by provider] metoprolol succinate  25 mg Oral Daily    furosemide  20 mg Oral Daily    bisacodyl  10 mg Rectal Daily    QUEtiapine  100 mg Oral Nightly    traZODone  100 mg Oral Nightly    tamsulosin  0.4 mg Oral Daily    metoclopramide  10 TECHNOLOGIST PROVIDED HISTORY:   Please do UGI through PEG   Gastric outlet obstruction. Please do through PEG       COMPARISON:   None.       TECHNIQUE:   Multiple single contrast images of the junction and stomach were obtained   following the oral administration of 200 mL of Omnipaque 240 through the   indwelling percutaneous gastrostomy tube as per ordering instructions.       FLUOROSCOPY DOSE AND TYPE OR TIME AND EXPOSURES:   DAP 57.114Kmyl2       FINDINGS:   There is prompt filling of the stomach.  No extravasation of contrast from   the gastrostomy tube of stomach.       There is no evidence for gastric distension.  Normal configuration of the   stomach.  Prompt passage of contrast from stomach into nondilated duodenal   loops and a few jejunal loops.           Impression   No fluoroscopic evidence for gastric outlet obstruction.             Medical Decision Nynhuu-Lhormzeu-Fodea:       Medical Decision Making-Other:     Note:  · Labs, medications, radiologic studies were reviewed with personal review of films  · Large amounts of data were reviewed  · Discussed with nursing Staff, Discharge planner  · Infection Control and Prevention measures reviewed  · All prior entries were reviewed  · Administer medications as ordered  · Prognosis: Guarded  · Discharge planning reviewed  · Follow up as outpatient. Thank you for allowing us to participate in the care of this patient. Please call with questions.     Messi Martin MD  Pager: [phone] - Office: [phone]

## 2020-06-02 ENCOUNTER — HOSPITAL ENCOUNTER (OUTPATIENT)
Dept: MEDSURG UNIT | Age: 75
Discharge: NURSING, REHAB OR LONG-TERM CARE | End: 2020-06-20
Attending: INTERNAL MEDICINE | Admitting: INTERNAL MEDICINE

## 2020-06-02 VITALS
HEART RATE: 91 BPM | BODY MASS INDEX: 32.97 KG/M2 | TEMPERATURE: 98.4 F | OXYGEN SATURATION: 97 % | DIASTOLIC BLOOD PRESSURE: 57 MMHG | RESPIRATION RATE: 18 BRPM | SYSTOLIC BLOOD PRESSURE: 133 MMHG | HEIGHT: 63 IN | WEIGHT: 186.07 LBS

## 2020-06-02 LAB
ABSOLUTE EOS #: 0.34 K/UL (ref 0–0.4)
ABSOLUTE IMMATURE GRANULOCYTE: 0.25 K/UL (ref 0–0.3)
ABSOLUTE LYMPH #: 1.18 K/UL (ref 1–4.8)
ABSOLUTE MONO #: 0.59 K/UL (ref 0.1–0.8)
BASOPHILS # BLD: 0 % (ref 0–2)
BASOPHILS ABSOLUTE: 0 K/UL (ref 0–0.2)
DIFFERENTIAL TYPE: ABNORMAL
EOSINOPHILS RELATIVE PERCENT: 4 % (ref 1–4)
GLUCOSE BLD-MCNC: 111 MG/DL (ref 65–105)
HCT VFR BLD CALC: 27.8 % (ref 36.3–47.1)
HEMOGLOBIN: 8.1 G/DL (ref 11.9–15.1)
IMMATURE GRANULOCYTES: 3 %
LYMPHOCYTES # BLD: 14 % (ref 24–44)
MCH RBC QN AUTO: 25.8 PG (ref 25.2–33.5)
MCHC RBC AUTO-ENTMCNC: 29.1 G/DL (ref 28.4–34.8)
MCV RBC AUTO: 88.5 FL (ref 82.6–102.9)
MONOCYTES # BLD: 7 % (ref 1–7)
MORPHOLOGY: ABNORMAL
NRBC AUTOMATED: 0 PER 100 WBC
NUCLEATED RED BLOOD CELLS: 1 PER 100 WBC
PDW BLD-RTO: 18.6 % (ref 11.8–14.4)
PLATELET # BLD: 398 K/UL (ref 138–453)
PLATELET ESTIMATE: ABNORMAL
PMV BLD AUTO: 11.1 FL (ref 8.1–13.5)
RBC # BLD: 3.14 M/UL (ref 3.95–5.11)
RBC # BLD: ABNORMAL 10*6/UL
SEG NEUTROPHILS: 72 % (ref 36–66)
SEGMENTED NEUTROPHILS ABSOLUTE COUNT: 6.04 K/UL (ref 1.8–7.7)
WBC # BLD: 8.4 K/UL (ref 3.5–11.3)
WBC # BLD: ABNORMAL 10*3/UL

## 2020-06-02 PROCEDURE — 6370000000 HC RX 637 (ALT 250 FOR IP): Performed by: SURGERY

## 2020-06-02 PROCEDURE — C9113 INJ PANTOPRAZOLE SODIUM, VIA: HCPCS | Performed by: SURGERY

## 2020-06-02 PROCEDURE — 6360000002 HC RX W HCPCS: Performed by: SURGERY

## 2020-06-02 PROCEDURE — 85025 COMPLETE CBC W/AUTO DIFF WBC: CPT

## 2020-06-02 PROCEDURE — 6370000000 HC RX 637 (ALT 250 FOR IP): Performed by: FAMILY MEDICINE

## 2020-06-02 PROCEDURE — 82947 ASSAY GLUCOSE BLOOD QUANT: CPT

## 2020-06-02 PROCEDURE — 36415 COLL VENOUS BLD VENIPUNCTURE: CPT

## 2020-06-02 PROCEDURE — 2580000003 HC RX 258: Performed by: SURGERY

## 2020-06-02 RX ADMIN — AMITRIPTYLINE HYDROCHLORIDE 25 MG: 25 TABLET, FILM COATED ORAL at 09:17

## 2020-06-02 RX ADMIN — ONDANSETRON 4 MG: 2 INJECTION INTRAMUSCULAR; INTRAVENOUS at 06:22

## 2020-06-02 RX ADMIN — PANTOPRAZOLE SODIUM 40 MG: 40 INJECTION, POWDER, LYOPHILIZED, FOR SOLUTION INTRAVENOUS at 09:19

## 2020-06-02 RX ADMIN — CLONAZEPAM 0.25 MG: 0.5 TABLET ORAL at 09:17

## 2020-06-02 RX ADMIN — SUCRALFATE 1 G: 1 TABLET ORAL at 09:18

## 2020-06-02 RX ADMIN — Medication 10 ML: at 09:27

## 2020-06-02 RX ADMIN — METOCLOPRAMIDE 10 MG: 5 INJECTION, SOLUTION INTRAMUSCULAR; INTRAVENOUS at 09:18

## 2020-06-02 RX ADMIN — FUROSEMIDE 20 MG: 20 TABLET ORAL at 09:18

## 2020-06-02 RX ADMIN — BUSPIRONE HYDROCHLORIDE 20 MG: 10 TABLET ORAL at 09:18

## 2020-06-02 RX ADMIN — OXYCODONE HYDROCHLORIDE AND ACETAMINOPHEN 1 TABLET: 5; 325 TABLET ORAL at 05:41

## 2020-06-02 RX ADMIN — MIDODRINE HYDROCHLORIDE 10 MG: 5 TABLET ORAL at 09:17

## 2020-06-02 RX ADMIN — ENOXAPARIN SODIUM 40 MG: 40 INJECTION SUBCUTANEOUS at 09:18

## 2020-06-02 RX ADMIN — ONDANSETRON 4 MG: 2 INJECTION INTRAMUSCULAR; INTRAVENOUS at 11:03

## 2020-06-02 RX ADMIN — TAMSULOSIN HYDROCHLORIDE 0.4 MG: 0.4 CAPSULE ORAL at 09:18

## 2020-06-02 RX ADMIN — ESCITALOPRAM OXALATE 20 MG: 10 TABLET ORAL at 09:18

## 2020-06-02 RX ADMIN — SUCRALFATE 1 G: 1 TABLET ORAL at 05:43

## 2020-06-02 RX ADMIN — METOCLOPRAMIDE 10 MG: 5 INJECTION, SOLUTION INTRAMUSCULAR; INTRAVENOUS at 05:41

## 2020-06-02 ASSESSMENT — PAIN SCALES - GENERAL: PAINLEVEL_OUTOF10: 8

## 2020-06-02 NOTE — PLAN OF CARE
- Informed by RN that patient was here overnight sec to unavailability of bed at SNF. No acute events overnight. Patient is scheduled to be transported this morning to LTAC at 10.30 AM. I did not evaluate this patient .    Jose Antonio Mata

## 2020-06-17 PROCEDURE — 87086 URINE CULTURE/COLONY COUNT: CPT

## 2020-06-17 PROCEDURE — 87040 BLOOD CULTURE FOR BACTERIA: CPT

## 2020-06-18 LAB
CULTURE: NORMAL
Lab: NORMAL
SPECIMEN DESCRIPTION: NORMAL

## 2020-06-18 PROCEDURE — U0003 INFECTIOUS AGENT DETECTION BY NUCLEIC ACID (DNA OR RNA); SEVERE ACUTE RESPIRATORY SYNDROME CORONAVIRUS 2 (SARS-COV-2) (CORONAVIRUS DISEASE [COVID-19]), AMPLIFIED PROBE TECHNIQUE, MAKING USE OF HIGH THROUGHPUT TECHNOLOGIES AS DESCRIBED BY CMS-2020-01-R: HCPCS

## 2020-06-19 ENCOUNTER — TELEPHONE (OUTPATIENT)
Dept: GASTROENTEROLOGY | Age: 75
End: 2020-06-19

## 2020-06-19 LAB
IRON SATURATION: 17 % (ref 20–55)
IRON: 40 UG/DL (ref 37–145)
TOTAL IRON BINDING CAPACITY: 232 UG/DL (ref 250–450)
UNSATURATED IRON BINDING CAPACITY: 192 UG/DL (ref 112–347)

## 2020-06-19 PROCEDURE — 83550 IRON BINDING TEST: CPT

## 2020-06-19 PROCEDURE — 83540 ASSAY OF IRON: CPT

## 2020-07-05 ENCOUNTER — HOSPITAL ENCOUNTER (INPATIENT)
Age: 75
LOS: 2 days | Discharge: NURSING, REHAB OR LONG-TERM CARE | DRG: 380 | End: 2020-07-07
Attending: EMERGENCY MEDICINE | Admitting: INTERNAL MEDICINE
Payer: MEDICARE

## 2020-07-05 ENCOUNTER — APPOINTMENT (OUTPATIENT)
Dept: GENERAL RADIOLOGY | Age: 75
DRG: 380 | End: 2020-07-05
Payer: MEDICARE

## 2020-07-05 PROBLEM — A41.9 SEVERE SEPSIS (HCC): Status: RESOLVED | Noted: 2018-04-30 | Resolved: 2020-07-05

## 2020-07-05 PROBLEM — R65.20 SEVERE SEPSIS (HCC): Status: RESOLVED | Noted: 2018-04-30 | Resolved: 2020-07-05

## 2020-07-05 PROBLEM — N17.9 ACUTE KIDNEY INJURY (HCC): Status: RESOLVED | Noted: 2018-05-01 | Resolved: 2020-07-05

## 2020-07-05 PROBLEM — J69.0 ASPIRATION PNEUMONIA (HCC): Status: RESOLVED | Noted: 2020-02-29 | Resolved: 2020-07-05

## 2020-07-05 PROBLEM — D62 ACUTE BLOOD LOSS ANEMIA: Status: ACTIVE | Noted: 2020-07-05

## 2020-07-05 PROBLEM — J96.90 RESPIRATORY FAILURE (HCC): Status: RESOLVED | Noted: 2020-02-29 | Resolved: 2020-07-05

## 2020-07-05 PROBLEM — K92.2 ACUTE UPPER GI BLEED: Status: ACTIVE | Noted: 2020-07-05

## 2020-07-05 LAB
-: NORMAL
ABSOLUTE EOS #: 0 K/UL (ref 0–0.4)
ABSOLUTE IMMATURE GRANULOCYTE: 0 K/UL (ref 0–0.3)
ABSOLUTE LYMPH #: 1.51 K/UL (ref 1–4.8)
ABSOLUTE MONO #: 1.26 K/UL (ref 0.1–0.8)
ALBUMIN SERPL-MCNC: 2.9 G/DL (ref 3.5–5.2)
ALBUMIN/GLOBULIN RATIO: 0.9 (ref 1–2.5)
ALP BLD-CCNC: 104 U/L (ref 35–104)
ALT SERPL-CCNC: 22 U/L (ref 5–33)
AMORPHOUS: NORMAL
AMYLASE: 101 U/L (ref 28–100)
ANION GAP SERPL CALCULATED.3IONS-SCNC: 13 MMOL/L (ref 9–17)
AST SERPL-CCNC: 22 U/L
BACTERIA: NORMAL
BASOPHILS # BLD: 0 % (ref 0–2)
BASOPHILS ABSOLUTE: 0 K/UL (ref 0–0.2)
BILIRUB SERPL-MCNC: 0.19 MG/DL (ref 0.3–1.2)
BILIRUBIN URINE: NEGATIVE
BUN BLDV-MCNC: 21 MG/DL (ref 8–23)
BUN/CREAT BLD: ABNORMAL (ref 9–20)
CALCIUM SERPL-MCNC: 9.1 MG/DL (ref 8.6–10.4)
CASTS UA: NORMAL /LPF (ref 0–8)
CHLORIDE BLD-SCNC: 96 MMOL/L (ref 98–107)
CO2: 25 MMOL/L (ref 20–31)
COLLAGEN ADENOSINE-5'-DIPHOSPHATE (ADP) TIME: 79 SEC (ref 67–112)
COLLAGEN EPINEPHRINE TIME: 86 SEC (ref 85–172)
COLOR: YELLOW
COMMENT UA: ABNORMAL
CREAT SERPL-MCNC: 0.64 MG/DL (ref 0.5–0.9)
CRYSTALS, UA: NORMAL /HPF
CULTURE: NORMAL
DIFFERENTIAL TYPE: ABNORMAL
EOSINOPHILS RELATIVE PERCENT: 0 % (ref 1–4)
EPITHELIAL CELLS UA: NORMAL /HPF (ref 0–5)
GFR AFRICAN AMERICAN: >60 ML/MIN
GFR NON-AFRICAN AMERICAN: >60 ML/MIN
GFR SERPL CREATININE-BSD FRML MDRD: ABNORMAL ML/MIN/{1.73_M2}
GFR SERPL CREATININE-BSD FRML MDRD: ABNORMAL ML/MIN/{1.73_M2}
GLUCOSE BLD-MCNC: 126 MG/DL (ref 65–105)
GLUCOSE BLD-MCNC: 128 MG/DL (ref 70–99)
GLUCOSE URINE: NEGATIVE
HCT VFR BLD CALC: 21.2 % (ref 36.3–47.1)
HCT VFR BLD CALC: 23.5 % (ref 36.3–47.1)
HCT VFR BLD CALC: 25.6 % (ref 36.3–47.1)
HEMOGLOBIN: 6.1 G/DL (ref 11.9–15.1)
HEMOGLOBIN: 7.3 G/DL (ref 11.9–15.1)
HEMOGLOBIN: 7.6 G/DL (ref 11.9–15.1)
IMMATURE GRANULOCYTES: 0 %
INR BLD: 1.2
KETONES, URINE: NEGATIVE
LACTIC ACID, WHOLE BLOOD: 1.4 MMOL/L (ref 0.7–2.1)
LEUKOCYTE ESTERASE, URINE: ABNORMAL
LIPASE: 12 U/L (ref 13–60)
LYMPHOCYTES # BLD: 6 % (ref 24–44)
Lab: NORMAL
MAGNESIUM: 1.5 MG/DL (ref 1.6–2.6)
MCH RBC QN AUTO: 23.8 PG (ref 25.2–33.5)
MCHC RBC AUTO-ENTMCNC: 29.7 G/DL (ref 28.4–34.8)
MCV RBC AUTO: 80 FL (ref 82.6–102.9)
MONOCYTES # BLD: 5 % (ref 1–7)
MORPHOLOGY: ABNORMAL
MORPHOLOGY: ABNORMAL
MUCUS: NORMAL
NITRITE, URINE: NEGATIVE
NRBC AUTOMATED: 0 PER 100 WBC
OTHER OBSERVATIONS UA: NORMAL
PDW BLD-RTO: 17.1 % (ref 11.8–14.4)
PH UA: 5.5 (ref 5–8)
PHOSPHORUS: 3.2 MG/DL (ref 2.6–4.5)
PLATELET # BLD: 293 K/UL (ref 138–453)
PLATELET ESTIMATE: ABNORMAL
PLATELET FUNCTION INTERP: NORMAL
PMV BLD AUTO: 10.5 FL (ref 8.1–13.5)
POTASSIUM SERPL-SCNC: 3.9 MMOL/L (ref 3.7–5.3)
PROCALCITONIN: 1.3 NG/ML
PROTEIN UA: ABNORMAL
PROTHROMBIN TIME: 12.1 SEC (ref 9–12)
RBC # BLD: 3.2 M/UL (ref 3.95–5.11)
RBC # BLD: ABNORMAL 10*6/UL
RBC UA: NORMAL /HPF (ref 0–4)
REASON FOR REJECTION: NORMAL
REASON FOR REJECTION: NORMAL
RENAL EPITHELIAL, UA: NORMAL /HPF
SARS-COV-2, PCR: NORMAL
SARS-COV-2, RAPID: NORMAL
SARS-COV-2: NOT DETECTED
SEG NEUTROPHILS: 89 % (ref 36–66)
SEGMENTED NEUTROPHILS ABSOLUTE COUNT: 22.33 K/UL (ref 1.8–7.7)
SODIUM BLD-SCNC: 134 MMOL/L (ref 135–144)
SOURCE: NORMAL
SPECIFIC GRAVITY UA: 1.02 (ref 1–1.03)
SPECIMEN DESCRIPTION: NORMAL
TOTAL PROTEIN: 6.1 G/DL (ref 6.4–8.3)
TRICHOMONAS: NORMAL
TROPONIN INTERP: ABNORMAL
TROPONIN INTERP: ABNORMAL
TROPONIN T: ABNORMAL NG/ML
TROPONIN T: ABNORMAL NG/ML
TROPONIN, HIGH SENSITIVITY: 71 NG/L (ref 0–14)
TROPONIN, HIGH SENSITIVITY: 83 NG/L (ref 0–14)
TURBIDITY: CLEAR
URINE HGB: NEGATIVE
UROBILINOGEN, URINE: NORMAL
WBC # BLD: 25.1 K/UL (ref 3.5–11.3)
WBC # BLD: ABNORMAL 10*3/UL
WBC UA: NORMAL /HPF (ref 0–5)
YEAST: NORMAL
ZZ NTE CLEAN UP: ORDERED TEST: NORMAL
ZZ NTE CLEAN UP: ORDERED TEST: NORMAL
ZZ NTE WITH NAME CLEAN UP: SPECIMEN SOURCE: NORMAL
ZZ NTE WITH NAME CLEAN UP: SPECIMEN SOURCE: NORMAL

## 2020-07-05 PROCEDURE — 6360000002 HC RX W HCPCS: Performed by: GENERAL PRACTICE

## 2020-07-05 PROCEDURE — 99285 EMERGENCY DEPT VISIT HI MDM: CPT

## 2020-07-05 PROCEDURE — 81001 URINALYSIS AUTO W/SCOPE: CPT

## 2020-07-05 PROCEDURE — 36430 TRANSFUSION BLD/BLD COMPNT: CPT

## 2020-07-05 PROCEDURE — 85018 HEMOGLOBIN: CPT

## 2020-07-05 PROCEDURE — P9016 RBC LEUKOCYTES REDUCED: HCPCS

## 2020-07-05 PROCEDURE — 6370000000 HC RX 637 (ALT 250 FOR IP): Performed by: INTERNAL MEDICINE

## 2020-07-05 PROCEDURE — 86920 COMPATIBILITY TEST SPIN: CPT

## 2020-07-05 PROCEDURE — 86850 RBC ANTIBODY SCREEN: CPT

## 2020-07-05 PROCEDURE — 84100 ASSAY OF PHOSPHORUS: CPT

## 2020-07-05 PROCEDURE — 86901 BLOOD TYPING SEROLOGIC RH(D): CPT

## 2020-07-05 PROCEDURE — 85025 COMPLETE CBC W/AUTO DIFF WBC: CPT

## 2020-07-05 PROCEDURE — 2580000003 HC RX 258: Performed by: GENERAL PRACTICE

## 2020-07-05 PROCEDURE — 84484 ASSAY OF TROPONIN QUANT: CPT

## 2020-07-05 PROCEDURE — 93005 ELECTROCARDIOGRAM TRACING: CPT | Performed by: GENERAL PRACTICE

## 2020-07-05 PROCEDURE — 84145 PROCALCITONIN (PCT): CPT

## 2020-07-05 PROCEDURE — 85576 BLOOD PLATELET AGGREGATION: CPT

## 2020-07-05 PROCEDURE — 82947 ASSAY GLUCOSE BLOOD QUANT: CPT

## 2020-07-05 PROCEDURE — 6360000002 HC RX W HCPCS: Performed by: NURSE PRACTITIONER

## 2020-07-05 PROCEDURE — 2580000003 HC RX 258: Performed by: INTERNAL MEDICINE

## 2020-07-05 PROCEDURE — 2580000003 HC RX 258: Performed by: FAMILY MEDICINE

## 2020-07-05 PROCEDURE — 82150 ASSAY OF AMYLASE: CPT

## 2020-07-05 PROCEDURE — 74022 RADEX COMPL AQT ABD SERIES: CPT

## 2020-07-05 PROCEDURE — 83690 ASSAY OF LIPASE: CPT

## 2020-07-05 PROCEDURE — U0003 INFECTIOUS AGENT DETECTION BY NUCLEIC ACID (DNA OR RNA); SEVERE ACUTE RESPIRATORY SYNDROME CORONAVIRUS 2 (SARS-COV-2) (CORONAVIRUS DISEASE [COVID-19]), AMPLIFIED PROBE TECHNIQUE, MAKING USE OF HIGH THROUGHPUT TECHNOLOGIES AS DESCRIBED BY CMS-2020-01-R: HCPCS

## 2020-07-05 PROCEDURE — 86900 BLOOD TYPING SEROLOGIC ABO: CPT

## 2020-07-05 PROCEDURE — C9113 INJ PANTOPRAZOLE SODIUM, VIA: HCPCS | Performed by: GENERAL PRACTICE

## 2020-07-05 PROCEDURE — 87040 BLOOD CULTURE FOR BACTERIA: CPT

## 2020-07-05 PROCEDURE — 99222 1ST HOSP IP/OBS MODERATE 55: CPT | Performed by: NURSE PRACTITIONER

## 2020-07-05 PROCEDURE — 2580000003 HC RX 258: Performed by: EMERGENCY MEDICINE

## 2020-07-05 PROCEDURE — 99223 1ST HOSP IP/OBS HIGH 75: CPT | Performed by: FAMILY MEDICINE

## 2020-07-05 PROCEDURE — 85014 HEMATOCRIT: CPT

## 2020-07-05 PROCEDURE — 2500000003 HC RX 250 WO HCPCS: Performed by: EMERGENCY MEDICINE

## 2020-07-05 PROCEDURE — 1200000000 HC SEMI PRIVATE

## 2020-07-05 PROCEDURE — 6360000002 HC RX W HCPCS: Performed by: EMERGENCY MEDICINE

## 2020-07-05 PROCEDURE — 83605 ASSAY OF LACTIC ACID: CPT

## 2020-07-05 PROCEDURE — 85610 PROTHROMBIN TIME: CPT

## 2020-07-05 PROCEDURE — 80053 COMPREHEN METABOLIC PANEL: CPT

## 2020-07-05 PROCEDURE — 83735 ASSAY OF MAGNESIUM: CPT

## 2020-07-05 RX ORDER — 0.9 % SODIUM CHLORIDE 0.9 %
500 INTRAVENOUS SOLUTION INTRAVENOUS ONCE
Status: COMPLETED | OUTPATIENT
Start: 2020-07-05 | End: 2020-07-05

## 2020-07-05 RX ORDER — ONDANSETRON 2 MG/ML
4 INJECTION INTRAMUSCULAR; INTRAVENOUS ONCE
Status: COMPLETED | OUTPATIENT
Start: 2020-07-05 | End: 2020-07-05

## 2020-07-05 RX ORDER — ACETAMINOPHEN 325 MG/1
650 TABLET ORAL EVERY 6 HOURS PRN
Status: DISCONTINUED | OUTPATIENT
Start: 2020-07-05 | End: 2020-07-05 | Stop reason: SDUPTHER

## 2020-07-05 RX ORDER — ACETAMINOPHEN 650 MG/1
650 SUPPOSITORY RECTAL EVERY 6 HOURS PRN
Status: DISCONTINUED | OUTPATIENT
Start: 2020-07-05 | End: 2020-07-07 | Stop reason: HOSPADM

## 2020-07-05 RX ORDER — PROMETHAZINE HYDROCHLORIDE 25 MG/ML
12.5 INJECTION, SOLUTION INTRAMUSCULAR; INTRAVENOUS ONCE
Status: COMPLETED | OUTPATIENT
Start: 2020-07-05 | End: 2020-07-05

## 2020-07-05 RX ORDER — SODIUM CHLORIDE 9 MG/ML
INJECTION, SOLUTION INTRAVENOUS CONTINUOUS
Status: DISCONTINUED | OUTPATIENT
Start: 2020-07-05 | End: 2020-07-06

## 2020-07-05 RX ORDER — MAGNESIUM SULFATE 1 G/100ML
1 INJECTION INTRAVENOUS
Status: COMPLETED | OUTPATIENT
Start: 2020-07-05 | End: 2020-07-05

## 2020-07-05 RX ORDER — ONDANSETRON 2 MG/ML
4 INJECTION INTRAMUSCULAR; INTRAVENOUS EVERY 6 HOURS PRN
Status: DISCONTINUED | OUTPATIENT
Start: 2020-07-05 | End: 2020-07-05 | Stop reason: SDUPTHER

## 2020-07-05 RX ORDER — 0.9 % SODIUM CHLORIDE 0.9 %
250 INTRAVENOUS SOLUTION INTRAVENOUS ONCE
Status: COMPLETED | OUTPATIENT
Start: 2020-07-05 | End: 2020-07-05

## 2020-07-05 RX ORDER — SODIUM CHLORIDE 0.9 % (FLUSH) 0.9 %
10 SYRINGE (ML) INJECTION EVERY 12 HOURS SCHEDULED
Status: DISCONTINUED | OUTPATIENT
Start: 2020-07-05 | End: 2020-07-07 | Stop reason: HOSPADM

## 2020-07-05 RX ORDER — 0.9 % SODIUM CHLORIDE 0.9 %
500 INTRAVENOUS SOLUTION INTRAVENOUS ONCE
Status: DISCONTINUED | OUTPATIENT
Start: 2020-07-05 | End: 2020-07-07 | Stop reason: HOSPADM

## 2020-07-05 RX ORDER — ACETAMINOPHEN 650 MG/1
650 SUPPOSITORY RECTAL EVERY 6 HOURS PRN
Status: DISCONTINUED | OUTPATIENT
Start: 2020-07-05 | End: 2020-07-05 | Stop reason: SDUPTHER

## 2020-07-05 RX ORDER — SODIUM CHLORIDE 0.9 % (FLUSH) 0.9 %
10 SYRINGE (ML) INJECTION EVERY 12 HOURS SCHEDULED
Status: DISCONTINUED | OUTPATIENT
Start: 2020-07-05 | End: 2020-07-05 | Stop reason: SDUPTHER

## 2020-07-05 RX ORDER — LORAZEPAM 0.5 MG/1
0.5 TABLET ORAL EVERY 6 HOURS PRN
Status: DISCONTINUED | OUTPATIENT
Start: 2020-07-05 | End: 2020-07-05

## 2020-07-05 RX ORDER — ACETAMINOPHEN 325 MG/1
650 TABLET ORAL EVERY 6 HOURS PRN
Status: DISCONTINUED | OUTPATIENT
Start: 2020-07-05 | End: 2020-07-07 | Stop reason: HOSPADM

## 2020-07-05 RX ORDER — SODIUM CHLORIDE 0.9 % (FLUSH) 0.9 %
10 SYRINGE (ML) INJECTION PRN
Status: DISCONTINUED | OUTPATIENT
Start: 2020-07-05 | End: 2020-07-07 | Stop reason: HOSPADM

## 2020-07-05 RX ORDER — LORAZEPAM 2 MG/ML
0.5 INJECTION INTRAMUSCULAR EVERY 6 HOURS PRN
Status: DISCONTINUED | OUTPATIENT
Start: 2020-07-05 | End: 2020-07-07 | Stop reason: HOSPADM

## 2020-07-05 RX ORDER — SODIUM CHLORIDE 0.9 % (FLUSH) 0.9 %
10 SYRINGE (ML) INJECTION PRN
Status: DISCONTINUED | OUTPATIENT
Start: 2020-07-05 | End: 2020-07-05 | Stop reason: SDUPTHER

## 2020-07-05 RX ORDER — ONDANSETRON 2 MG/ML
4 INJECTION INTRAMUSCULAR; INTRAVENOUS EVERY 6 HOURS PRN
Status: DISCONTINUED | OUTPATIENT
Start: 2020-07-05 | End: 2020-07-07 | Stop reason: HOSPADM

## 2020-07-05 RX ADMIN — SODIUM CHLORIDE: 9 INJECTION, SOLUTION INTRAVENOUS at 13:30

## 2020-07-05 RX ADMIN — Medication 1250 MG: at 06:11

## 2020-07-05 RX ADMIN — ONDANSETRON 4 MG: 2 INJECTION INTRAMUSCULAR; INTRAVENOUS at 04:34

## 2020-07-05 RX ADMIN — SODIUM CHLORIDE 500 ML: 9 INJECTION, SOLUTION INTRAVENOUS at 04:27

## 2020-07-05 RX ADMIN — LORAZEPAM 0.5 MG: 2 INJECTION INTRAMUSCULAR; INTRAVENOUS at 19:51

## 2020-07-05 RX ADMIN — MAGNESIUM SULFATE HEPTAHYDRATE 1 G: 1 INJECTION, SOLUTION INTRAVENOUS at 10:55

## 2020-07-05 RX ADMIN — PROMETHAZINE HYDROCHLORIDE 12.5 MG: 25 INJECTION INTRAMUSCULAR; INTRAVENOUS at 08:01

## 2020-07-05 RX ADMIN — SODIUM CHLORIDE 8 MG/HR: 9 INJECTION, SOLUTION INTRAVENOUS at 05:47

## 2020-07-05 RX ADMIN — ONDANSETRON 4 MG: 2 INJECTION INTRAMUSCULAR; INTRAVENOUS at 08:01

## 2020-07-05 RX ADMIN — SODIUM CHLORIDE 80 MG: 9 INJECTION, SOLUTION INTRAVENOUS at 05:41

## 2020-07-05 RX ADMIN — SODIUM CHLORIDE: 9 INJECTION, SOLUTION INTRAVENOUS at 18:30

## 2020-07-05 RX ADMIN — FAMOTIDINE 20 MG: 10 INJECTION, SOLUTION INTRAVENOUS at 08:01

## 2020-07-05 RX ADMIN — Medication 10 ML: at 19:55

## 2020-07-05 RX ADMIN — MEROPENEM 1 G: 1 INJECTION, POWDER, FOR SOLUTION INTRAVENOUS at 06:52

## 2020-07-05 RX ADMIN — ACETAMINOPHEN 650 MG: 650 SUPPOSITORY RECTAL at 18:30

## 2020-07-05 RX ADMIN — SODIUM CHLORIDE 250 ML: 0.9 INJECTION, SOLUTION INTRAVENOUS at 13:00

## 2020-07-05 RX ADMIN — MAGNESIUM SULFATE HEPTAHYDRATE 1 G: 1 INJECTION, SOLUTION INTRAVENOUS at 12:35

## 2020-07-05 RX ADMIN — SODIUM CHLORIDE 8 MG/HR: 9 INJECTION, SOLUTION INTRAVENOUS at 18:30

## 2020-07-05 ASSESSMENT — ENCOUNTER SYMPTOMS
NAUSEA: 1
DIARRHEA: 0
CONSTIPATION: 0
RHINORRHEA: 0
SHORTNESS OF BREATH: 0
VOMITING: 1
COUGH: 0
CHEST TIGHTNESS: 0
ABDOMINAL PAIN: 1
BLOOD IN STOOL: 1
WHEEZING: 0

## 2020-07-05 ASSESSMENT — PAIN DESCRIPTION - PAIN TYPE
TYPE: ACUTE PAIN

## 2020-07-05 ASSESSMENT — PAIN DESCRIPTION - ORIENTATION
ORIENTATION: MID
ORIENTATION: MID
ORIENTATION: MID;UPPER

## 2020-07-05 ASSESSMENT — PAIN DESCRIPTION - LOCATION
LOCATION: ABDOMEN

## 2020-07-05 ASSESSMENT — PAIN DESCRIPTION - ONSET
ONSET: ON-GOING

## 2020-07-05 ASSESSMENT — PAIN DESCRIPTION - DESCRIPTORS
DESCRIPTORS: SHARP
DESCRIPTORS: SHARP
DESCRIPTORS: ACHING

## 2020-07-05 ASSESSMENT — PAIN DESCRIPTION - FREQUENCY
FREQUENCY: CONTINUOUS

## 2020-07-05 ASSESSMENT — PAIN SCALES - GENERAL
PAINLEVEL_OUTOF10: 9
PAINLEVEL_OUTOF10: 8
PAINLEVEL_OUTOF10: 8

## 2020-07-05 NOTE — ED NOTES
Writer answered a call from a man that stated that he was pt's son. Pt's son updated on plan of care for admission and that the pt was currently resting comfortably. Pt's son stated that he isnt allowed in the ER so writer needs to tell him everything.  Pt 's son became angry and hung up the phone     Adrianna Peralta RN  07/05/20 [id number]

## 2020-07-05 NOTE — ED NOTES
Pt blood finished infusing, pt denies pain or needs at this time, will continue to monitor, waiting for bed placement on floor     Nabor Garcia RN  07/05/20 [id number]

## 2020-07-05 NOTE — ED PROVIDER NOTES
[facility] ED  Emergency Department  Emergency Medicine Resident Sign-out     Care of Anita Teague was assumed from Dr. Travis Lemus and is being seen for Abdominal Pain and Nausea & Vomiting  . The patient's initial evaluation and plan have been discussed with the prior provider who initially evaluated the patient.      EMERGENCY DEPARTMENT COURSE / MEDICAL DECISION MAKING:       MEDICATIONS GIVEN:  Orders Placed This Encounter   Medications    LORazepam (ATIVAN) 2 MG/ML injection     Rohini Sandhu: cabinet override    pantoprazole (PROTONIX) 80 mg in sodium chloride 0.9 % 50 mL bolus    0.9 % sodium chloride bolus    pantoprazole (PROTONIX) 80 mg in sodium chloride 0.9 % 100 mL infusion    ondansetron (ZOFRAN) injection 4 mg    vancomycin (VANCOCIN) 1250 mg in dextrose 5 % 250 mL IVPB    meropenem (MERREM) 1 g in sodium chloride 0.9 % 100 mL IVPB (mini-bag)    promethazine (PHENERGAN) injection 12.5 mg    ondansetron (ZOFRAN) injection 4 mg    famotidine (PEPCID) injection 20 mg    0.9 % sodium chloride bolus    magnesium sulfate 1 g in dextrose 5% 100 mL IVPB    0.9 % sodium chloride bolus    0.9 % sodium chloride infusion    sodium chloride flush 0.9 % injection 10 mL    sodium chloride flush 0.9 % injection 10 mL    OR Linked Order Group     acetaminophen (TYLENOL) tablet 650 mg     acetaminophen (TYLENOL) suppository 650 mg    ondansetron (ZOFRAN) injection 4 mg    DISCONTD: enoxaparin (LOVENOX) injection 40 mg       LABS / RADIOLOGY:     Labs Reviewed   CBC WITH AUTO DIFFERENTIAL - Abnormal; Notable for the following components:       Result Value    WBC 25.1 (*)     RBC 3.20 (*)     Hemoglobin 7.6 (*)     Hematocrit 25.6 (*)     MCV 80.0 (*)     MCH 23.8 (*)     RDW 17.1 (*)     Seg Neutrophils 89 (*)     Lymphocytes 6 (*)     Eosinophils % 0 (*)     Segs Absolute 22.33 (*)     Absolute Mono # 1.26 (*)     All other components within normal limits   COMPREHENSIVE METABOLIC PANEL - Abnormal; Notable for the following components:    Glucose 128 (*)     Sodium 134 (*)     Chloride 96 (*)     Total Bilirubin 0.19 (*)     Total Protein 6.1 (*)     Alb 2.9 (*)     Albumin/Globulin Ratio 0.9 (*)     All other components within normal limits   TROPONIN - Abnormal; Notable for the following components:    Troponin, High Sensitivity 71 (*)     All other components within normal limits   LIPASE - Abnormal; Notable for the following components:    Lipase 12 (*)     All other components within normal limits   URINALYSIS - Abnormal; Notable for the following components:    Protein, UA TRACE (*)     Leukocyte Esterase, Urine SMALL (*)     All other components within normal limits   MAGNESIUM - Abnormal; Notable for the following components:    Magnesium 1.5 (*)     All other components within normal limits   AMYLASE - Abnormal; Notable for the following components:    Amylase 101 (*)     All other components within normal limits   PROCALCITONIN - Abnormal; Notable for the following components:    Procalcitonin 1.30 (*)     All other components within normal limits   PROTIME-INR - Abnormal; Notable for the following components:    Protime 12.1 (*)     All other components within normal limits   HEMOGLOBIN AND HEMATOCRIT, BLOOD - Abnormal; Notable for the following components:    Hemoglobin 6.1 (*)     Hematocrit 21.2 (*)     All other components within normal limits   TROPONIN - Abnormal; Notable for the following components:    Troponin, High Sensitivity 83 (*)     All other components within normal limits   POC GLUCOSE FINGERSTICK - Abnormal; Notable for the following components:    POC Glucose 126 (*)     All other components within normal limits   CULTURE, BLOOD 1   CULTURE, BLOOD 2   LACTIC ACID, WHOLE BLOOD   PHOSPHORUS   SPECIMEN REJECTION   MICROSCOPIC URINALYSIS   SPECIMEN REJECTION   PLATELET FUNCTION TEST   PREVIOUS SPECIMEN   PREVIOUS SPECIMEN   COVID-19   HEMOGLOBIN AND HEMATOCRIT, BLOOD   HEMOGLOBIN AND HEMATOCRIT, BLOOD   TYPE AND SCREEN   PREPARE RBC (CROSSMATCH)       Xr Acute Abd Series Chest 1 Vw    Result Date: 7/5/2020  EXAMINATION: TWO XRAY VIEWS OF THE ABDOMEN AND SINGLE  XRAY VIEW OF THE CHEST 7/5/2020 5:01 am COMPARISON: 05/30/2020.  05/24/2020. HISTORY: ORDERING SYSTEM PROVIDED HISTORY: abdominal pain TECHNOLOGIST PROVIDED HISTORY: abdominal pain Reason for Exam: pain FINDINGS: Chest: Frontal view. Patient rotation to the left. Normal lung volume. Bilateral heterogeneous opacities with left basilar consolidation. Indistinct pulmonary vasculature. Small left pleural effusion. No pneumothorax. Cardiomegaly. Multilevel degenerative disc disease. Abdomen: Frontal supine and upright views. No intraperitoneal free air. Gastrostomy tube overlies the left upper quadrant. Grossly stable biliary stent. Nonspecific, nonobstructive bowel gas pattern. Stable regional skeleton. Bilateral heterogeneous pulmonary opacities with left basilar consolidation. Differential considerations include atelectasis, asymmetric pulmonary edema and an infectious/inflammatory process. Small left pleural effusion. No intraperitoneal free air. Nonobstructive bowel gas pattern. Gastrostomy tube overlies the left upper quadrant. RECENT VITALS:     Temp: 99.2 °F (37.3 °C),  Pulse: 89, Resp: 20, BP: (!) 110/55, SpO2: 99 %    This patient is a 76 y.o. Female with complaints of worsening abdominal pain, nausea vomiting, hematemesis with coffee-ground emesis and melanotic stools. History of Nissen fundoplication with Dr. Alia Pacheco 2/20. Significant history of complications following the procedure with recurrent respiratory failure placement of trach as well as malnutrition unable to eat with a PEG tube placed. Patient reports that she uses neither at this time. She is still residing in an LTAC. Was found to be anemic and had recurrent episodes of hematemesis here.   Surgery has no plans

## 2020-07-05 NOTE — ED NOTES
Pt report from [facility], pt is here for vomiting blood, pt has dark red blood in suction canister and positive hemoccult, waiting for surgery plan of care     [address] [place] [address], RN  07/05/20 [id number]

## 2020-07-05 NOTE — CONSULTS
General Surgery:  Consult Note        PATIENT NAME: Letty Modi OF BIRTH: 1945    ADMISSION DATE: 7/5/2020  3:57 AM     Admitting Provider: Dr. Jonathon Le Physician: Dr. Lund Rm: 7/5/2020    Chief Complaint:  Hematemesis   Consult Regarding:  Upper GI bleed     HISTORY OF PRESENT ILLNESS:  The patient is a 76 y.o. female  who was admitted on 7/5/20 for hematemesis. Patient has history of robotic Nissen fundoplication and hiatal hernia CHERYL repair complicated by extended ICU stay with trach and PEG placement in Feb/Mar, robotic cholecystectomy and pyloroplasty in May, ulcerative esophagitis and gastric ulcers. Patient complains of abdominal,  coughing x 3 days, nausea and vomiting x 2 days and hematemesis x 1 day. In the ED patient had hematemesis with coffee ground colored vomitus, FOBT was positive. Hgb 7.6, leukocytosis of 25 and troponin of 71. Patient no longer has the tracheostomy, she is tolerating a full po diet and only flushing the PEG tube. Bowel function has been normal. Patient was sent here from [facility].        Past Medical History:        Diagnosis Date    Anxiety     Arthritis     Back pain     Bronchitis     Caffeine use     8 coffee / day    Carpal tunnel syndrome     Cataracts, bilateral     Constipation     Depression     Diarrhea     GERD (gastroesophageal reflux disease)     H/O gastric ulcer     Hyperlipidemia     Hypertension     Mumps     MVP (mitral valve prolapse)     Dr. Jaime Blackburn in May 2019    Recurrent UTI     Dr. Rocha Galvan    Sinusitis     Trache[facility] in place [facility])     Ulcerative esophagitis     Wellness examination     Dr. Olegario Blizzard seen in Jan 2020       Past Surgical History:        Procedure Laterality Date    APPENDECTOMY     [address], LAPAROSCOPIC  05/22/2020     LAPAROSCOPIC ROBOTIC CHOLECYSTECTOMY, PYLOROPLASTY     CHOLECYSTECTOMY, LAPAROSCOPIC N/A 5/22/2020    XI LAPAROSCOPIC ROBOTIC CHOLECYSTECTOMY, PYLOROPLASTY performed by Oscar Whitman MD at [address]      EGD  3/17/2020         ERCP  05/21/2020    with stent insertion, balloon dilation, sphinctereotomy    ERCP  5/21/2020    ** CASE IN OR WITY GI STAFF** ERCP STENT INSERTION performed by Zeinab Nolan MD at [facility]    ERCP  5/21/2020    ** CASE IN OR WITH GI STAFF**ERCP SPHINCTER/PAPILLOTOMY performed by Zeinab Nolan MD at [facility]    ERCP  5/21/2020    ** CASE IN OR WITH GI STAFF**ERCP DILATION BALLOON performed by Zeinab Nolan MD at [address]    GASTRIC FUNDOPLICATION N/A 4/62/0723    XI LAPAROSCOPIC ROBOTIC HIATAL HERNIA REPAIR, CHERYL FUNDOPLICATION performed by Oscar Whitman MD at [facility] N/A 3/27/2020    EGD PEG TUBE PLACEMENT performed by Oscar Whitman MD at [address] [place]. CATH POWER PICC TRIPLE  3/4/2020         HYSTERECTOMY      Abdominal    JOINT REPLACEMENT Right     right hip    OVARY REMOVAL      RHINOPLASTY      TONSILLECTOMY      TOTAL HIP ARTHROPLASTY      TOTAL NEPHRECTOMY      atrophic from infections, age 13   [facility] TRACHEOSTOMY N/A 3/27/2020    **ADD ON **WANTS 10:00AM **TRACHEOTOMY performed by Oscar Whitman MD at [address]  N/A 4/2/2020    TRACHEOTOMY EXCHANGE performed by Oscar Whitman MD at [facility] 3/17/2020    BEDSIDE EGD ESOPHAGOGASTRODUODENOSCOPY (ICU) performed by Oscar Whitman MD at [address] [place] [address] N/A 5/18/2020    EGD BIOPSY performed by Zeinab Nolan MD at [address] [place] [address]  5/18/2020    EGD DILATION BALLOON performed by Zeinab Nolan MD at [facility]       Medications Prior to Admission:   Not in a hospital admission. Allergies:  Prednisone;  Compazine [prochlorperazine maleate]; Penicillin v potassium; and Penicillins    Social History:   Social History     Socioeconomic History    Marital status:      Spouse name: Not on file    Number of children: 2    Years of education: Not on file    Highest education level: Not on file   Occupational History    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   Social Needs    Financial resource strain: Not on file    Food insecurity     Worry: Not on file     Inability: Not on file   [employer] needs     Medical: Not on file     Non-medical: Not on file   Tobacco Use    Smoking status: Current Every Day Smoker     Packs/day: 1.00     Years: 50.00     Pack years: 50.00     Types: Cigarettes    Smokeless tobacco: Never Used   Substance and Sexual Activity    Alcohol use: No    Drug use: No    Sexual activity: Never   Lifestyle    Physical activity     Days per week: Not on file     Minutes per session: Not on file    Stress: Not on file   Relationships    Social connections     Talks on phone: Not on file     Gets together: Not on file     Attends Jainism service: Not on file     Active member of club or organization: Not on file     Attends meetings of clubs or organizations: Not on file     Relationship status: Not on file    Intimate partner violence     Fear of current or ex partner: Not on file     Emotionally abused: Not on file     Physically abused: Not on file     Forced sexual activity: Not on file   Other Topics Concern    Not on file   Social History Narrative    Not on file       Family History:       Problem Relation Age of Onset    Cancer Mother         uterine    Heart Attack Mother     Heart Attack Father     Other Maternal Grandfather         foot gangrene    Other Paternal Grandmother         bleeding ulcers    Other Paternal Grandfather         lung cancer       REVIEW OF SYSTEMS:    CONSTITUTIONAL: Denies recent weight loss, fatigue, fevers, chills. HEENT: Denies rhinorrhea, dysphagia, odynphagia.    CARDIOVASCULAR: 07/05/2020    K 3.9 07/05/2020    CL 96 07/05/2020    CO2 25 07/05/2020    BUN 21 07/05/2020    LABALBU 2.9 07/05/2020    CREATININE 0.64 07/05/2020    CALCIUM 9.1 07/05/2020    GFRAA >60 07/05/2020    LABGLOM >60 07/05/2020    GLUCOSE 128 07/05/2020     Troponin:  No results found for: TROPONINI    Pertinent Radiology:   Xr Acute Abd Series Chest 1 Vw    Result Date: 7/5/2020  EXAMINATION: TWO XRAY VIEWS OF THE ABDOMEN AND SINGLE  XRAY VIEW OF THE CHEST 7/5/2020 5:01 am COMPARISON: 05/30/2020.  05/24/2020. HISTORY: ORDERING SYSTEM PROVIDED HISTORY: abdominal pain TECHNOLOGIST PROVIDED HISTORY: abdominal pain Reason for Exam: pain FINDINGS: Chest: Frontal view. Patient rotation to the left. Normal lung volume. Bilateral heterogeneous opacities with left basilar consolidation. Indistinct pulmonary vasculature. Small left pleural effusion. No pneumothorax. Cardiomegaly. Multilevel degenerative disc disease. Abdomen: Frontal supine and upright views. No intraperitoneal free air. Gastrostomy tube overlies the left upper quadrant. Grossly stable biliary stent. Nonspecific, nonobstructive bowel gas pattern. Stable regional skeleton. Bilateral heterogeneous pulmonary opacities with left basilar consolidation. Differential considerations include atelectasis, asymmetric pulmonary edema and an infectious/inflammatory process. Small left pleural effusion. No intraperitoneal free air. Nonobstructive bowel gas pattern. Gastrostomy tube overlies the left upper quadrant. ASSESSMENT:  There are no active hospital problems to display for this patient. 77 yo F with complicated surgical history presented with hematemesis due to upper GI bleed. No free air on KUB or signs of perforation. S/p Robotic nissen fundoplication and hiatal hernia repair 2/24/20  S/p Trach and PEG 3/27/20  S/p Cholecystectomy and pyloroplasty s/p 5/24/2020  Left lower lobe consolidation     Plan:  1.  Continue medical mgmt and supportive care per primary  2. No surgical intervention at this time  3. Recommend follow up outpatient for elective endoscopy        Electronically signed by Darrel Corral MD  on 7/5/2020 at 8:33 AM     I have reviewed the resident's note and I either performed the key elements of the medical history and physical exam or was present with the resident when the key elements of the medical history and physical exam were performed. I have discussed the findings, established the care plan and recommendations with Resident. Patient does not have an acute surgical abdomen. Patient is hemodynamically stable. Patient can follow up with foregut surgeon for outpatient endoscopy.     Electronically signed by Micky Sahu IV, DO on 7/8/20 at 9:29 AM EDT

## 2020-07-05 NOTE — ED NOTES
Pt assisted in changing the channel, pt denies needs at this time, will continue to monitor     Veronica Herrera RN  07/05/20 [id number]

## 2020-07-05 NOTE — ED PROVIDER NOTES
[facility] ED  Emergency Department  Faculty Sign-Out Addendum     Care of Alexus Love was assumed from previous attending and is being seen for Abdominal Pain and Nausea & Vomiting  . The patient's initial evaluation and plan have been discussed with the prior provider who initially evaluated the patient. Handoff taken on the following patient from prior Attending Physician:    Carol Ann Aguilera    I was available and discussed any additional care issues that arose and coordinated the management plans with the resident(s) caring for the patient during my duty period. Any areas of disagreement with residents documentation of care or procedures are noted on the chart. I was personally present for the key portions of any/all procedures during my duty period. I have documented in the chart those procedures where I was not present during the key portions.       EMERGENCY DEPARTMENT COURSE / MEDICAL DECISION MAKING:       MEDICATIONS GIVEN:  Orders Placed This Encounter   Medications    LORazepam (ATIVAN) 2 MG/ML injection     Rohini Sandhu: cabinet override    pantoprazole (PROTONIX) 80 mg in sodium chloride 0.9 % 50 mL bolus    0.9 % sodium chloride bolus    pantoprazole (PROTONIX) 80 mg in sodium chloride 0.9 % 100 mL infusion    ondansetron (ZOFRAN) injection 4 mg    vancomycin (VANCOCIN) 1250 mg in dextrose 5 % 250 mL IVPB    meropenem (MERREM) 1 g in sodium chloride 0.9 % 100 mL IVPB (mini-bag)    promethazine (PHENERGAN) injection 12.5 mg    ondansetron (ZOFRAN) injection 4 mg    famotidine (PEPCID) injection 20 mg    0.9 % sodium chloride bolus       LABS / RADIOLOGY:     Labs Reviewed   CBC WITH AUTO DIFFERENTIAL - Abnormal; Notable for the following components:       Result Value    WBC 25.1 (*)     RBC 3.20 (*)     Hemoglobin 7.6 (*)     Hematocrit 25.6 (*)     MCV 80.0 (*)     MCH 23.8 (*)     RDW 17.1 (*)     Seg Neutrophils 89 (*)     Lymphocytes 6 (*)     Eosinophils % 0 (*)     Segs Absolute 22.33 (*)     Absolute Mono # 1.26 (*)     All other components within normal limits   COMPREHENSIVE METABOLIC PANEL - Abnormal; Notable for the following components:    Glucose 128 (*)     Sodium 134 (*)     Chloride 96 (*)     Total Bilirubin 0.19 (*)     Total Protein 6.1 (*)     Alb 2.9 (*)     Albumin/Globulin Ratio 0.9 (*)     All other components within normal limits   TROPONIN - Abnormal; Notable for the following components:    Troponin, High Sensitivity 71 (*)     All other components within normal limits   LIPASE - Abnormal; Notable for the following components:    Lipase 12 (*)     All other components within normal limits   URINALYSIS - Abnormal; Notable for the following components:    Protein, UA TRACE (*)     Leukocyte Esterase, Urine SMALL (*)     All other components within normal limits   MAGNESIUM - Abnormal; Notable for the following components:    Magnesium 1.5 (*)     All other components within normal limits   AMYLASE - Abnormal; Notable for the following components:    Amylase 101 (*)     All other components within normal limits   PROCALCITONIN - Abnormal; Notable for the following components:    Procalcitonin 1.30 (*)     All other components within normal limits   PROTIME-INR - Abnormal; Notable for the following components:    Protime 12.1 (*)     All other components within normal limits   POC GLUCOSE FINGERSTICK - Abnormal; Notable for the following components:    POC Glucose 126 (*)     All other components within normal limits   CULTURE, BLOOD 1   CULTURE, BLOOD 2   LACTIC ACID, WHOLE BLOOD   PHOSPHORUS   SPECIMEN REJECTION   MICROSCOPIC URINALYSIS   SPECIMEN REJECTION   PLATELET FUNCTION TEST   TROPONIN   PREVIOUS SPECIMEN   PREVIOUS SPECIMEN   HEMOGLOBIN AND HEMATOCRIT, BLOOD   TYPE AND SCREEN       Xr Acute Abd Series Chest 1 Vw    Result Date: 7/5/2020  EXAMINATION: TWO XRAY VIEWS OF THE ABDOMEN AND SINGLE  XRAY VIEW OF THE CHEST 7/5/2020 5:01 am COMPARISON: 05/30/2020.  05/24/2020. HISTORY: ORDERING SYSTEM PROVIDED HISTORY: abdominal pain TECHNOLOGIST PROVIDED HISTORY: abdominal pain Reason for Exam: pain FINDINGS: Chest: Frontal view. Patient rotation to the left. Normal lung volume. Bilateral heterogeneous opacities with left basilar consolidation. Indistinct pulmonary vasculature. Small left pleural effusion. No pneumothorax. Cardiomegaly. Multilevel degenerative disc disease. Abdomen: Frontal supine and upright views. No intraperitoneal free air. Gastrostomy tube overlies the left upper quadrant. Grossly stable biliary stent. Nonspecific, nonobstructive bowel gas pattern. Stable regional skeleton. Bilateral heterogeneous pulmonary opacities with left basilar consolidation. Differential considerations include atelectasis, asymmetric pulmonary edema and an infectious/inflammatory process. Small left pleural effusion. No intraperitoneal free air. Nonobstructive bowel gas pattern. Gastrostomy tube overlies the left upper quadrant. RECENT VITALS:     Temp: 99.2 °F (37.3 °C),  Pulse: 93, Resp: 19, BP: (!) 101/54, SpO2: 98 %    This patient is a 76 y.o. Female with abdominal pain. Has had some cough productive of green sputum. Guaiac positive. White count of 25. Antibiotics initiated. Admission. Bariatrics consult. OUTSTANDING TASKS / RECOMMENDATIONS:    1.  Admission      Michaela Fernandez MD, [facility]  Attending Emergency Physician  [facility] ED        Luis Guevara MD  07/05/20 1981

## 2020-07-05 NOTE — ED PROVIDER NOTES
METABOLIC PANEL - Abnormal; Notable for the following components:    Glucose 128 (*)     Sodium 134 (*)     Chloride 96 (*)     Total Bilirubin 0.19 (*)     Total Protein 6.1 (*)     Alb 2.9 (*)     Albumin/Globulin Ratio 0.9 (*)     All other components within normal limits   TROPONIN - Abnormal; Notable for the following components:    Troponin, High Sensitivity 71 (*)     All other components within normal limits   LIPASE - Abnormal; Notable for the following components:    Lipase 12 (*)     All other components within normal limits   URINALYSIS - Abnormal; Notable for the following components:    Protein, UA TRACE (*)     Leukocyte Esterase, Urine SMALL (*)     All other components within normal limits   MAGNESIUM - Abnormal; Notable for the following components:    Magnesium 1.5 (*)     All other components within normal limits   AMYLASE - Abnormal; Notable for the following components:    Amylase 101 (*)     All other components within normal limits   PROCALCITONIN - Abnormal; Notable for the following components:    Procalcitonin 1.30 (*)     All other components within normal limits   PROTIME-INR - Abnormal; Notable for the following components:    Protime 12.1 (*)     All other components within normal limits   HEMOGLOBIN AND HEMATOCRIT, BLOOD - Abnormal; Notable for the following components:    Hemoglobin 6.1 (*)     Hematocrit 21.2 (*)     All other components within normal limits   TROPONIN - Abnormal; Notable for the following components:    Troponin, High Sensitivity 83 (*)     All other components within normal limits   POC GLUCOSE FINGERSTICK - Abnormal; Notable for the following components:    POC Glucose 126 (*)     All other components within normal limits   CULTURE, BLOOD 1   CULTURE, BLOOD 2   LACTIC ACID, WHOLE BLOOD   PHOSPHORUS   SPECIMEN REJECTION   MICROSCOPIC URINALYSIS   SPECIMEN REJECTION   PLATELET FUNCTION TEST   PREVIOUS SPECIMEN   PREVIOUS SPECIMEN   COVID-19   HEMOGLOBIN AND tube placement) ([facility]    3. UGIB (upper gastrointestinal bleed)    4. Septicemia ([facility]    5.  Acute blood loss anemia        DISPOSITION:         DISPOSITION:  []  Discharge   []  Transfer -    [x]  Admission -  Intermed   []  Against Medical Advice   []  Eloped   FOLLOW-UP: Lisa [phone]  [facility] New Pres[facility]    No medications on file           Tedi Leyden, MD  Emergency Medicine Resident  [address] [facility]        Tedi Leyden, MD  Resident  07/06/20 [id number]

## 2020-07-05 NOTE — ED PROVIDER NOTES
[facility]. 3  Emergency Department Encounter  EmergencyMedicine Resident     Pt Name:Erin Mackey  MRN: [id number]  [place] 1945  Date of evaluation: 7/5/20  PCP:  Khanh Esparza MD    [address] [place] [address]       Chief Complaint   Patient presents with    Abdominal Pain    Nausea & Vomiting       HISTORY OF PRESENT ILLNESS  (Location/Symptom, Timing/Onset, Context/Setting, Quality, Duration, Modifying Factors, Severity.)      Naun Plascencia is a 76 y.o. female who presents with 4 hours of abdominal pain, coffee-ground emesis, dark tarry stools per nurse at [facility], patient states she underwent a Nissen fundoplication in February, patient states she has had some dark coffee-ground emesis since yesterday, and also states that the nurse at her facility noticed melena like stools. Patient recently had long intubation. As well as long hospital stay was transferred to [facility] after suffering for aspiration pneumonia, respiratory failure patient received PEG and tracheostomy. Patient states she has been having productive cough of dark brown-green sputum, fatigue, fevers    PAST MEDICAL / SURGICAL / SOCIAL / FAMILY HISTORY      has a past medical history of Anxiety, Arthritis, Back pain, Bronchitis, Caffeine use, Carpal tunnel syndrome, Cataracts, bilateral, Constipation, Depression, Diarrhea, GERD (gastroesophageal reflux disease), H/O gastric ulcer, Hyperlipidemia, Hypertension, Mumps, MVP (mitral valve prolapse), Recurrent UTI, Sinusitis, Tracheostomy in place [facility]), Ulcerative esophagitis, and Wellness examination. has a past surgical history that includes Hysterectomy; total nephrectomy; Tonsillectomy; Appendectomy; Cystoscopy; Ovary removal; Tubal ligation; rhinoplasty; Carpal tunnel release; Hand surgery; Total hip arthroplasty; eye surgery; Colonoscopy; joint replacement (Right); Gastric fundoplication (N/A, 1/29/8975); hc cath power picc triple (3/4/2020); EGD (3/17/2020);  Upper gastrointestinal endoscopy (N/A, 3/17/2020); tracheostomy (N/A, 3/27/2020); Gastrostomy tube placement (N/A, 3/27/2020); tracheostomy (N/A, 4/2/2020); Upper gastrointestinal endoscopy (N/A, 5/18/2020); Upper gastrointestinal endoscopy (5/18/2020); ERCP (05/21/2020); Cholecystectomy, laparoscopic (05/22/2020); ERCP (5/21/2020); ERCP (5/21/2020); ERCP (5/21/2020); and Cholecystectomy, laparoscopic (N/A, 5/22/2020). Social History     Socioeconomic History    Marital status:       Spouse name: Not on file    Number of children: 2    Years of education: Not on file    Highest education level: Not on file   Occupational History    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   Social Needs    Financial resource strain: Not on file    Food insecurity     Worry: Not on file     Inability: Not on file   [employer] needs     Medical: Not on file     Non-medical: Not on file   Tobacco Use    Smoking status: Current Every Day Smoker     Packs/day: 1.00     Years: 50.00     Pack years: 50.00     Types: Cigarettes    Smokeless tobacco: Never Used   Substance and Sexual Activity    Alcohol use: No    Drug use: No    Sexual activity: Never   Lifestyle    Physical activity     Days per week: Not on file     Minutes per session: Not on file    Stress: Not on file   Relationships    Social connections     Talks on phone: Not on file     Gets together: Not on file     Attends Muslim service: Not on file     Active member of club or organization: Not on file     Attends meetings of clubs or organizations: Not on file     Relationship status: Not on file    Intimate partner violence     Fear of current or ex partner: Not on file     Emotionally abused: Not on file     Physically abused: Not on file     Forced sexual activity: Not on file   Other Topics Concern    Not on file   Social History Narrative    Not on file       Family History   Problem Relation Age of Onset    Cancer Mother         uterine    Heart Attack Mother     Heart Attack Father     Other Maternal Grandfather         foot gangrene    Other Paternal Grandmother         bleeding ulcers    Other Paternal Grandfather         lung cancer       Allergies:  Prednisone; Compazine [prochlorperazine maleate]; Penicillin v potassium; and Penicillins    Home Medications:  Prior to Admission medications    Medication Sig Start Date End Date Taking?  Authorizing Provider   midodrine (PROAMATINE) 5 MG tablet Take 1 tablet by mouth 3 times daily as needed (if SBP < 100) 6/1/20   Ryan Felder MD   metoprolol succinate (TOPROL XL) 25 MG extended release tablet Take 1 tablet by mouth daily 5/30/20   Ryan Felder MD   busPIRone (BUSPAR) 10 MG tablet Take 2 tablets by mouth 3 times daily 4/6/20   Yamile Garcia MD   escitalopram (LEXAPRO) 20 MG tablet Take 1 tablet by mouth daily 4/6/20   Yamile Garcia MD   QUEtiapine (SEROQUEL) 100 MG tablet Take 1 tablet by mouth nightly 4/6/20   Yamile Garcia MD   furosemide (LASIX) 20 MG tablet Take 1 tablet by mouth daily 4/6/20   Yamile Garcia MD   docusate (COLACE) 50 MG/5ML liquid 10 mLs by Per G Tube route 2 times daily 4/6/20   Yamile Garcia MD   metoclopramide (REGLAN) 5 MG/ML injection Infuse 2 mLs intravenously every 6 hours 4/6/20   Yamile Garcia MD   sucralfate (CARAFATE) 1 GM tablet Take 1 tablet by mouth 4 times daily 4/6/20   Yamile Garcia MD   traZODone (DESYREL) 100 MG tablet Take 1 tablet by mouth nightly 4/6/20   Yamile Garcia MD   pantoprazole (PROTONIX) 40 MG injection Infuse 40 mg intravenously 2 times daily 4/6/20   Yamile Garcia MD   RA VITAMIN D-3 50 MCG (2000 UT) CAPS take 1 capsule by mouth once daily 2/14/20   Historical Provider, MD   amitriptyline (ELAVIL) 25 MG tablet Take 25 mg by mouth three times daily     Historical Provider, MD   Oyster Shell 500 MG TABS Take 500 mg by mouth 2 times daily     Historical Provider, MD   clonazePAM (KLONOPIN) 0.5 MG tablet Take 0.5 mg by mouth 2 times daily. Taking 1/2 tablet BID    Historical Provider, MD   Acetaminophen 500 MG CAPS Take 1 tablet by mouth as needed for Pain (follow OTC instructions)    Historical Provider, MD   calcium carbonate (TUMS) 500 MG chewable tablet Take 1 tablet by mouth 3 times daily as needed for Heartburn    Historical Provider, MD   gabapentin (NEURONTIN) 300 MG capsule Take 300 mg by mouth 3 times daily. Historical Provider, MD   simvastatin (ZOCOR) 40 MG tablet Take 40 mg by mouth nightly. Historical Provider, MD   Sepsis Times and Checklist  Vital Signs: BP: (!) 127/51  Pulse: 99  Resp: 21  Temp: 100.4 °F (38 °C) SpO2: 94 %    SIRS (>2)   Temp > 38.3C or < 36C   HR > 90   RR > 20   WBC > 12 or < 4 or >10% bands    SIRS (>2) and confirmed or suspected source of infection = Sepsis     Sepsis Identified:  7/5/20  Time: 457am       CBC: Yes   CMP: Yes   PT/PTT: Yes   Blood Cultures x2: Yes   Urinalysis and Urine Culture:  Yes   Lactate: Yes   Broad Spectrum Antibiotics Given within 3 hrs Yes   IV Crystalloid given: Yes               Fluids completed within 3 hours of sepsis identification. No        REVIEW OF SYSTEMS    (2-9 systems for level 4, 10 or more for level 5)      Review of Systems   Constitutional: Positive for activity change, appetite change and fatigue. Respiratory: Negative for shortness of breath. Cardiovascular: Negative for chest pain. Gastrointestinal: Positive for abdominal pain, blood in stool, nausea and vomiting. Genitourinary: Negative for dysuria. Neurological: Negative for syncope, light-headedness and headaches. PHYSICAL EXAM   (up to 7 for level 4, 8 or more for level 5)      INITIAL VITALS:   BP (!) 127/51   Pulse 99   Temp 100.4 °F (38 °C) (Oral)   Resp 21   Ht 5' 3\" (1.6 m)   Wt 183 lb 10.3 oz (83.3 kg)   SpO2 94%   BMI 32.53 kg/m²     Physical Exam  Constitutional:       General: She is not in acute distress.      Appearance: She is ill-appearing and diaphoretic. HENT:      Head: Normocephalic and atraumatic. Mouth/Throat:      Mouth: Mucous membranes are moist.      Pharynx: No oropharyngeal exudate or posterior oropharyngeal erythema. Cardiovascular:      Rate and Rhythm: Tachycardia present. Pulses: Normal pulses. Heart sounds: Normal heart sounds. Pulmonary:      Effort: Pulmonary effort is normal.      Comments: Rhonchi and decreased breath sounds on left  Abdominal:      General: Abdomen is flat. Tenderness: There is abdominal tenderness. Comments: Tenderness, no guarding, no distention, no rebound, PEG tube in place no signs of infection   Musculoskeletal:      Right lower leg: Edema present. Left lower leg: Edema present. Skin:     Comments: Patient is clammy and diaphoretic, pale   Neurological:      General: No focal deficit present. Mental Status: She is alert and oriented to person, place, and time. Psychiatric:         Mood and Affect: Mood normal.         Thought Content:  Thought content normal.         DIFFERENTIAL  DIAGNOSIS     PLAN (LABS / IMAGING / EKG):  Orders Placed This Encounter   Procedures    Culture, Blood 1    Culture, Blood 2    Culture, Blood 1    Culture, Blood 2    XR Acute Abd Series Chest 1 VW    CBC Auto Differential    Comprehensive Metabolic Panel    Lactic Acid, Whole Blood    Lipase    Urinalysis    Magnesium    Phosphorus    Procalcitonin    Amylase    Procalcitonin    SPECIMEN REJECTION    PREVIOUS SPECIMEN    Microscopic Urinalysis    SPECIMEN REJECTION    Platelet function test    PREVIOUS SPECIMEN    Protime-INR    HEMOGLOBIN AND HEMATOCRIT, BLOOD    COVID-19    Hemoglobin and Hematocrit, Blood, Post Transfusion    Troponin    Basic Metabolic Panel w/ Reflex to MG    Iron and TIBC    Hemoglobin and Hematocrit, Blood    CBC auto differential    Protime-INR    Basic Metabolic Panel w/ Reflex to MG    Iron and TIBC    Hemoglobin and Hematocrit, Blood    Lactate, Sepsis    Diet NPO Effective Now Exceptions are: Ice Chips    VITAL SIGNS PER TRANSFUSION PROTOCOL    Verify informed consent    TRANSFUSION REACTION MANAGEMENT    Vital signs per unit routine    Notify physician    Bedrest with bathroom privileges    Daily weights    Intake and output    Nursing to document all stools for color and consistency    Place intermittent pneumatic compression device    Endo Procedure    Vital signs per unit routine    Notify physician    Bedrest with bathroom privileges    Daily weights    Intake and output    Nursing to document all stools for color and consistency    Place intermittent pneumatic compression device    Daily weights    Intake and output    Limited Code    Inpatient consult to General Surgery    Inpatient consult to Cardiology    Inpatient consult to GI    Inpatient consult to Hospitalist    Consult to GI    Consult to GI    Inpatient consult to Infectious Diseases    Initiate Oxygen Therapy Protocol    Initiate Oxygen Therapy Protocol    POC Glucose Fingerstick    EKG 12 Lead    TYPE AND SCREEN    PREPARE RBC (CROSSMATCH), 1 Units    PATIENT STATUS (FROM ED OR OR/PROCEDURAL) Inpatient    PATIENT STATUS (FROM ED OR OR/PROCEDURAL) Inpatient       MEDICATIONS ORDERED:  Orders Placed This Encounter   Medications    LORazepam (ATIVAN) 2 MG/ML injection     Rohini Sandhu: cabinet override    pantoprazole (PROTONIX) 80 mg in sodium chloride 0.9 % 50 mL bolus    0.9 % sodium chloride bolus    pantoprazole (PROTONIX) 80 mg in sodium chloride 0.9 % 100 mL infusion    ondansetron (ZOFRAN) injection 4 mg    vancomycin (VANCOCIN) 1250 mg in dextrose 5 % 250 mL IVPB    meropenem (MERREM) 1 g in sodium chloride 0.9 % 100 mL IVPB (mini-bag)    promethazine (PHENERGAN) injection 12.5 mg    ondansetron (ZOFRAN) injection 4 mg    famotidine (PEPCID) injection 20 mg    0.9 % sodium chloride bolus  magnesium sulfate 1 g in dextrose 5% 100 mL IVPB    0.9 % sodium chloride bolus    0.9 % sodium chloride infusion    DISCONTD: sodium chloride flush 0.9 % injection 10 mL    DISCONTD: sodium chloride flush 0.9 % injection 10 mL    DISCONTD: acetaminophen (TYLENOL) tablet 650 mg    DISCONTD: acetaminophen (TYLENOL) suppository 650 mg    DISCONTD: ondansetron (ZOFRAN) injection 4 mg    DISCONTD: enoxaparin (LOVENOX) injection 40 mg    0.9 % sodium chloride infusion    DISCONTD: sodium chloride flush 0.9 % injection 10 mL    DISCONTD: sodium chloride flush 0.9 % injection 10 mL    OR Linked Order Group     acetaminophen (TYLENOL) tablet 650 mg     acetaminophen (TYLENOL) suppository 650 mg    ondansetron (ZOFRAN) injection 4 mg    enoxaparin (LOVENOX) injection 40 mg    sodium chloride flush 0.9 % injection 10 mL    sodium chloride flush 0.9 % injection 10 mL    DISCONTD: LORazepam (ATIVAN) tablet 0.5 mg    LORazepam (ATIVAN) injection 0.5 mg       DDX: Peptic ulcer disease, upper GI bleed, lower GI bleed, ACS/MI, pneumonia, septicemia, postsurgical complication    DIAGNOSTIC RESULTS / EMERGENCY DEPARTMENT COURSE / MDM   :  Results for orders placed or performed during the hospital encounter of 07/05/20   Culture, Blood 1   Result Value Ref Range    Specimen Description . BLOOD     Special Requests LHAND 20ML     Culture NO GROWTH 7 HOURS    Culture, Blood 2   Result Value Ref Range    Specimen Description . BLOOD     Special Requests 20 RH     Culture NO GROWTH 7 HOURS    Culture, Blood 1   Result Value Ref Range    Specimen Description . BLOOD     Special Requests NOT REPORTED     Culture NOT NEED PER RN    CBC Auto Differential   Result Value Ref Range    WBC 25.1 (H) 3.5 - 11.3 k/uL    RBC 3.20 (L) 3.95 - 5.11 m/uL    Hemoglobin 7.6 (L) 11.9 - 15.1 g/dL    Hematocrit 25.6 (L) 36.3 - 47.1 %    MCV 80.0 (L) 82.6 - 102.9 fL    MCH 23.8 (L) 25.2 - 33.5 pg    MCHC 29.7 28.4 - 34.8 g/dL    RDW 17.1 pulmonary opacities with left basilar consolidation. Differential considerations include atelectasis, asymmetric pulmonary edema   and an infectious/inflammatory process.       Small left pleural effusion.       No intraperitoneal free air.  Nonobstructive bowel gas pattern.       Gastrostomy tube overlies the left upper quadrant.             EKG  Sinus tachycardia, rate of 106, normal axis, normal intervals, good R wave progression, minimal ST depression in V4 V5, no acute T wave abnormalities. All EKG's are interpreted by the Emergency Department Physician who either signs or Co-signs this chart in the absence of a cardiologist.    EMERGENCY DEPARTMENT COURSE/IMPRESSION  51-year-old female present emergency department with 1 day of abdominal pain, hematemesis, hematochezia with melena, patient has significant medical history of peptic ulcer disease, Nissen fundoplication, tracheostomy and PEG placement. Patient was tachycardic, febrile on arrival, appeared ill, stool guaiac positive, patient had one bout of coffee-ground emesis which was positive for heme. Concern for sepsis, patient elevated white blood cell count, broad-spectrum antibiotics were given with vancomycin and cefepime, patient was started on Protonix bolus and Protonix drip. Patient elevated troponin, cardiology was consulted recommend trending troponins they will evaluate patient, patient had surgery completed by Dr. Ba Isabel, general surgery service was consulted came down to evaluate patient.   Patient was signed out to Dr. Erin Novak pending general surgery recommendations and disposition, patient states only has 1 kidney, concern for fluid overload, was not given 30 mg/kg bolus due to this reason, fluids were gently given due to history of CHF and concern for renal impairment    PROCEDURES:  None    CONSULTS:  IP CONSULT TO GENERAL SURGERY  IP CONSULT TO CARDIOLOGY  IP CONSULT TO GI  IP CONSULT TO HOSPITALIST  IP CONSULT TO GI  IP CONSULT TO GI  IP CONSULT TO INFECTIOUS DISEASES    CRITICAL CARE:  None    FINAL IMPRESSION      1. Generalized abdominal pain    2. S/P Nissen fundoplication (with gastrostomy tube placement) ([facility] [address]    3. UGIB (upper gastrointestinal bleed)    4. Septicemia ([facility] [address]    5.  Acute blood loss anemia          DISPOSITION / PLAN     DISPOSITION Admitted 07/05/2020 12:59:51 PM      PATIENT REFERRED TO:  Wygreyteresa Vu, [address]             DISCHARGE MEDICATIONS:  Current Discharge Medication List          Will Dawson DO  Emergency Medicine Resident    (Please note that portions of thisnote were completed with a voice recognition program.  Efforts were made to edit the dictations but occasionally words are mis-transcribed.)     Will Dawson DO  Resident  07/05/20 [id number]

## 2020-07-05 NOTE — ED NOTES
Pt resting on stretcher with eyes closed.  Pt waiting for admission and clean inpatient bed     [facility]  07/05/20 [id number]

## 2020-07-05 NOTE — ED PROVIDER NOTES
[facility]     Emergency Department     Faculty Attestation    I performed a history and physical examination of the patient and discussed management with the resident. I have reviewed and agree with the residents findings including all diagnostic interpretations, and treatment plans as written at the time of my review. Any areas of disagreement are noted on the chart. I was personally present for the key portions of any procedures. I have documented in the chart those procedures where I was not present during the key portions. For Physician Assistant/ Nurse Practitioner cases/documentation I have personally evaluated this patient and have completed at least one if not all key elements of the E/M (history, physical exam, and MDM). Additional findings are as noted. This patient was evaluated in the Emergency Department for symptoms described in the history of present illness. The patient was evaluated in the context of the global COVID-19 pandemic, which necessitated consideration that the patient might be at risk for infection with the SARS-CoV-2 virus that causes COVID-19. Institutional protocols and algorithms that pertain to the evaluation of patients at risk for COVID-19 are in a state of rapid change based on information released by regulatory bodies including the CDC and federal and state organizations. These policies and algorithms were followed during the patient's care in the ED. Primary Care Physician: Andres Avalos MD    History: This is a 76 y.o. female who presents to the Emergency Department with complaint of nausea abdominal pain cough. Yesterday patient had some coughing with a productive greenish sputum but denies any fever chills or sweats. She then began having nausea vomiting and abdominal pain. She was given enema which did produce stool. The patient states the nurses told her that it was very melanotic.     Physical:   weight

## 2020-07-05 NOTE — ED PROVIDER NOTES
PANEL - Abnormal; Notable for the following components:    Glucose 128 (*)     Sodium 134 (*)     Chloride 96 (*)     Total Bilirubin 0.19 (*)     Total Protein 6.1 (*)     Alb 2.9 (*)     Albumin/Globulin Ratio 0.9 (*)     All other components within normal limits   TROPONIN - Abnormal; Notable for the following components:    Troponin, High Sensitivity 71 (*)     All other components within normal limits   LIPASE - Abnormal; Notable for the following components:    Lipase 12 (*)     All other components within normal limits   URINALYSIS - Abnormal; Notable for the following components:    Protein, UA TRACE (*)     Leukocyte Esterase, Urine SMALL (*)     All other components within normal limits   MAGNESIUM - Abnormal; Notable for the following components:    Magnesium 1.5 (*)     All other components within normal limits   AMYLASE - Abnormal; Notable for the following components:    Amylase 101 (*)     All other components within normal limits   PROCALCITONIN - Abnormal; Notable for the following components:    Procalcitonin 1.30 (*)     All other components within normal limits   PROTIME-INR - Abnormal; Notable for the following components:    Protime 12.1 (*)     All other components within normal limits   HEMOGLOBIN AND HEMATOCRIT, BLOOD - Abnormal; Notable for the following components:    Hemoglobin 6.1 (*)     Hematocrit 21.2 (*)     All other components within normal limits   TROPONIN - Abnormal; Notable for the following components:    Troponin, High Sensitivity 83 (*)     All other components within normal limits   POC GLUCOSE FINGERSTICK - Abnormal; Notable for the following components:    POC Glucose 126 (*)     All other components within normal limits   CULTURE, BLOOD 1   CULTURE, BLOOD 2   LACTIC ACID, WHOLE BLOOD   PHOSPHORUS   SPECIMEN REJECTION   MICROSCOPIC URINALYSIS   SPECIMEN REJECTION   PLATELET FUNCTION TEST   PREVIOUS SPECIMEN   PREVIOUS SPECIMEN   COVID-19   HEMOGLOBIN AND HEMATOCRIT, BLOOD HEMOGLOBIN AND HEMATOCRIT, BLOOD   TYPE AND SCREEN   PREPARE RBC (CROSSMATCH)       Xr Acute Abd Series Chest 1 Vw    Result Date: 7/5/2020  EXAMINATION: TWO XRAY VIEWS OF THE ABDOMEN AND SINGLE  XRAY VIEW OF THE CHEST 7/5/2020 5:01 am COMPARISON: 05/30/2020.  05/24/2020. HISTORY: ORDERING SYSTEM PROVIDED HISTORY: abdominal pain TECHNOLOGIST PROVIDED HISTORY: abdominal pain Reason for Exam: pain FINDINGS: Chest: Frontal view. Patient rotation to the left. Normal lung volume. Bilateral heterogeneous opacities with left basilar consolidation. Indistinct pulmonary vasculature. Small left pleural effusion. No pneumothorax. Cardiomegaly. Multilevel degenerative disc disease. Abdomen: Frontal supine and upright views. No intraperitoneal free air. Gastrostomy tube overlies the left upper quadrant. Grossly stable biliary stent. Nonspecific, nonobstructive bowel gas pattern. Stable regional skeleton. Bilateral heterogeneous pulmonary opacities with left basilar consolidation. Differential considerations include atelectasis, asymmetric pulmonary edema and an infectious/inflammatory process. Small left pleural effusion. No intraperitoneal free air. Nonobstructive bowel gas pattern. Gastrostomy tube overlies the left upper quadrant. RECENT VITALS:     Temp: 98.5 °F (36.9 °C),  Pulse: 93, Resp: 20, BP: 128/65, SpO2: 91 %    This patient is a 76 y.o. Female with abdominal pain, altered mentation however hemoglobin 6. Elevated white blood count. Prior bariatric surgery. Adding antibiotics and admitted. OUTSTANDING TASKS / RECOMMENDATIONS:    1. Awaiting admission      Skye Gonzales.  Antonio Pretty MD, Romy Moreau  Attending Emergency Physician  [facility] ED        Lisa Bach MD  07/05/20 [id number]

## 2020-07-05 NOTE — CONSULTS
THE [facility] AT [facility]  Consultation Note     . REASON FOR CONSULTATION:    Repeated coffee ground emesis, melena  Abdominal pain, HX of Nissen 2/2020    HISTORY OF PRESENT ILLNESS:     This is a 76 y.o. female with PMH including complicated medical history including robotic nissen fundoplication and hiatal hernia repair 2/242020, s/p trach and peg after extended hospitalization. 5/18/2020 EGD showed Severe Esophagitis, severe Gastritis   5/21/2020 Pt had ERCP completed with cholangiogram, sphincterotomy and stent placed in the CBD due to stricture. 5/24/2020 Cholecystectomy and pyloroplasty   Additionally, PMH includes HLD, HTN, MVP, recurrent UTI, and anxiety    Pt presents from [facility] with c/o cough and vomiting that turned into hematemesis yesterday am. Upon arrival to our ER, she had moderate episode of coffee ground hematemesis this am and was started on IV fluids and PPI drip. Hgb was found to be 6.1 for which she will be recieving 1 unit PRBC's. Pt states since discharge, she had been doing well. Tolerating diet, had trach removed and no weight loss. Currently, no fever, chills, shortness of breath, diarrhea, or blood in stools. NO alcohol, smoking or drug use. No Nsaid use. Pt has been taking Carafate but no PPI.     KUB today was negative for perforation    Previous GI history:     5/18/2020 EGD per Dr. Wyatt Hays:  Findings:     Retropharyngeal area was grossly normal appearing     Esophagus: abnormal: Severe esophagitis with geographic appearance to mucosa, with spared araes and erythematous inflamed areas, the entire lower 1/3 of the esophagus is looking like that .     Stomach:  Severe nodular type  gastritis, bxs taken   PEG in place      Pyloric orifice was tight at first and was difficult to pass scope through but after that it was wide open, but because of the barium study result I did dilate the pyloric orifice with 15-16-18 mm balloon   Now it is wide open see image      Duodenum: STVZ OR    GASTROSTOMY TUBE PLACEMENT N/A 3/27/2020    EGD PEG TUBE PLACEMENT performed by Catalina Macias MD at [address] CATH POWER PICC TRIPLE  3/4/2020         HYSTERECTOMY      Abdominal    JOINT REPLACEMENT Right     right hip    OVARY REMOVAL      RHINOPLASTY      TONSILLECTOMY      TOTAL HIP ARTHROPLASTY      TOTAL NEPHRECTOMY      atrophic from infections, age 13   Deysi Levans TRACHEOSTOMY N/A 3/27/2020    **ADD ON **WANTS 10:00AM **TRACHEOTOMY performed by Catalina Macias MD at [address] N/A 4/2/2020    TRACHEOTOMY EXCHANGE performed by Catalina Macias MD at [address] [facility] N/A 3/17/2020    BEDSIDE EGD ESOPHAGOGASTRODUODENOSCOPY (ICU) performed by Catalina Macias MD at [facility] Endoscopy    UPPER GASTROINTESTINAL ENDOSCOPY N/A 5/18/2020    EGD BIOPSY performed by Damien Rivers MD at [facility]  5/18/2020    EGD DILATION BALLOON performed by Damien Rivers MD at [facility] Endoscopy     Family History   Problem Relation Age of Onset    Cancer Mother         uterine    Heart Attack Mother     Heart Attack Father     Other Maternal Grandfather         foot gangrene    Other Paternal Grandmother         bleeding ulcers    Other Paternal Grandfather         lung cancer       Allergies: Allergies   Allergen Reactions    Prednisone Anxiety    Compazine [Prochlorperazine Maleate]     Penicillin V Potassium     Penicillins Other (See Comments)     shock       Home medications:  Prior to Admission medications    Medication Sig Start Date End Date Taking?  Authorizing Provider   midodrine (PROAMATINE) 5 MG tablet Take 1 tablet by mouth 3 times daily as needed (if SBP < 100) 6/1/20   Heather Boast, MD   metoprolol succinate (TOPROL XL) 25 MG extended release tablet Take 1 tablet by mouth daily 5/30/20   Heather Boast, MD   busPIRone (BUSPAR) 10 MG tablet Take 2 tablets by mouth 3 mL Intravenous Once     . Continuous Infusions:   pantoprozole (PROTONIX) infusion 8 mg/hr (20 0547)     . PRN Meds:    REVIEW OF SYSTEMS:     Constitutional: No fever, no chills, no lethargy, no weakness, no weight loss  HEENT:  No headache, otalgia, itchy eyes, nasal discharge or sore throat. Cardiac:  No chest pain, dyspnea, orthopnea or PND. Chest:   No cough, phlegm or wheezing. Abdomen:  Coffee ground emesis, vomiting  Neuro:  No focal weakness, abnormal movements or seizure like activity. Skin:   No rashes, no itching. :   No hematuria, no pyuria, no dysuria, no flank pain. Extremities:  No swelling or joint pains. ROS was otherwise negative except as mentioned in the [address] PHYSICAL EXAM:    BP (!) 110/55   Pulse 89   Temp 99.2 °F (37.3 °C)   Resp 20   Wt 185 lb 9.6 oz (84.2 kg)   SpO2 99%   BMI 32.88 kg/m²   . TMAX[24]    General: Chronically ill appearing  Head:  Normocephalic, Atraumatic  EENT: EOMI, Sclera not icteric, Oropharynx moist  Neck:  Supple, Trachea midline  Lungs:CTA Bilaterally  Heart: RRR, No murmur, No rub, No gallop, PMI nondisplaced. Abdomen:Soft, Non tender, Not distended, BS WNL,  No masses. No hepatomegalia   Ext:No clubbing. No cyanosis. No edema. Skin: No rashes. No jaundice. No stigmata of liver disease. Neuro:  A&O x Three, No focal neurological deficits    Labs and Imagin2020 KUB:     FINDINGS:   There is prompt filling of the stomach.  No extravasation of contrast from   the gastrostomy tube of stomach.       There is no evidence for gastric distension.  Normal configuration of the   stomach.  Prompt passage of contrast from stomach into nondilated duodenal   loops and a few jejunal loops.           Impression   No fluoroscopic evidence for gastric outlet obstruction. Hemotological labs: Anemia studies:  No results for input(s): LABIRON, TIBC, FERRITIN, [id number], FOLATE, OCCULTBLD in the last 72 hours.     CBC:  Recent Labs 07/05/20  0428 07/05/20  1007   WBC 25.1*  --    HGB 7.6* 6.1*   MCV 80.0*  --    RDW 17.1*  --      --        PT/INR:  Recent Labs     07/05/20  0633   PROTIME 12.1*   INR 1.2       BMP:  Recent Labs     07/05/20 0428   *   K 3.9   CL 96*   CO2 25   BUN 21   CREATININE 0.64   GLUCOSE 128*   CALCIUM 9.1       Liver work up:  Hepatitis Functional Panel:  Recent Labs     07/05/20 0428   ALKPHOS 104   ALT 22   AST 22   PROT 6.1*   BILITOT 0.19*   LABALBU 2.9*       Amylase/Lipase/Ammonia:  Recent Labs     07/05/20 0428   AMYLASE 101*   LIPASE 12*       Acute Hepatitis Panel:  Lab Results   Component Value Date    HEPBSAG NONREACTIVE 05/16/2020    HEPCAB NONREACTIVE 05/16/2020    HEPBIGM NONREACTIVE 05/16/2020    HEPAIGM NONREACTIVE 05/16/2020       Cancer Markers:  CEA:    No results for input(s): CEA in the last 72 hours. Ca 125:   No results for input(s):  in the last 72 hours. Ca 19-9:     Invalid input(s):   AFP: No results for input(s): AFP in the last 72 hours. Active Problems:    * No active hospital problems. *  Resolved Problems:    * No resolved hospital problems. *       Assessment and plan of care:   76 yr old admitted with coffee ground hematemesis. Pt with significant surgical and medical history. DDX includes Esophagitis, MWT, Gastritis, ulceration at anastomotic site, PUD, and pill induced Esophagitis. 1. Coffee ground hematemesis:  -Will plan for EGD in am  -Cont PPI drip  -Clear ok-NPO MN  -Monitor for more vomiting  -Supportive care with antiemetics    2. Mixed Anemia-Hgb 6.1, MCV 80.0, Iron Sat 17%  -Transfuse to maintain Hgb greater than 7  -Rec iron supplementation  -Rec MV  -Monitor closely for active bleeding  -Rec CBC, BMP daily.  Hgb q 8 once level is above 7    Will follow close;y  This plan was formulated in collaboration with Dr. Dayna Huizar MD    Electronically signed by:  Courtney Jose [facility], THE [facility]  [phone]  7/5/2020    11:49 AM Attestation:  Patient seen and examined. Case discussed with Ms. Dhara Arenas. Agree with above. Patient has acute on chronic anemia with some melena and coffee ground emesis. Fortunately she is currently hemodynamically stable. Agree with transfusion and once Hgb above or equal to 7 will proceed with EGD. This procedure, the alternatives including no work up or treatment, risks and benefits were discussed. Among the risks discussed included cardiorespiratory suppression, aspiration, bleeding, failure to diagnose cancer or other pathology and perforation requiring surgery. The patient claimed to understand what was explained and all of her questions were answered.

## 2020-07-05 NOTE — ED NOTES
Pt watching tv, denies needs at this time, will continue to monitor, no vomiting noted since [address], RN  07/05/20 9597

## 2020-07-05 NOTE — H&P
3/27/2020    **ADD ON **WANTS 10:00AM **TRACHEOTOMY performed by Bari Andujar MD at [address] N/A 4/2/2020    TRACHEOTOMY EXCHANGE performed by Bari Andujar MD at [address] 3/17/2020    BEDSIDE EGD ESOPHAGOGASTRODUODENOSCOPY (ICU) performed by Bari Andujar MD at [address] [place] [address] N/A 5/18/2020    EGD BIOPSY performed by Tobi Steel MD at [address] [place] [address]  5/18/2020    EGD DILATION BALLOON performed by Tobi Steel MD at [facility] Endoscopy        Medications Prior to Admission:     Prior to Admission medications    Medication Sig Start Date End Date Taking?  Authorizing Provider   midodrine (PROAMATINE) 5 MG tablet Take 1 tablet by mouth 3 times daily as needed (if SBP < 100) 6/1/20   Sadie Blackman MD   metoprolol succinate (TOPROL XL) 25 MG extended release tablet Take 1 tablet by mouth daily 5/30/20   Sadie Blackman MD   busPIRone (BUSPAR) 10 MG tablet Take 2 tablets by mouth 3 times daily 4/6/20   Klever Pepe MD   escitalopram (LEXAPRO) 20 MG tablet Take 1 tablet by mouth daily 4/6/20   Klever Pepe MD   QUEtiapine (SEROQUEL) 100 MG tablet Take 1 tablet by mouth nightly 4/6/20   Klever Pepe MD   furosemide (LASIX) 20 MG tablet Take 1 tablet by mouth daily 4/6/20   Klever Pepe MD   docusate (COLACE) 50 MG/5ML liquid 10 mLs by Per G Tube route 2 times daily 4/6/20   Klever Pepe MD   metoclopramide (REGLAN) 5 MG/ML injection Infuse 2 mLs intravenously every 6 hours 4/6/20   Klever Pepe MD   sucralfate (CARAFATE) 1 GM tablet Take 1 tablet by mouth 4 times daily 4/6/20   Klever Pepe MD   traZODone (DESYREL) 100 MG tablet Take 1 tablet by mouth nightly 4/6/20   Klever Pepe MD   pantoprazole (PROTONIX) 40 MG injection Infuse 40 mg intravenously 2 times daily 4/6/20   Elizabeth Blank Dima Grewal MD   RA VITAMIN D-3 50 MCG (2000 UT) CAPS take 1 capsule by mouth once daily 2/14/20   Historical Provider, MD   amitriptyline (ELAVIL) 25 MG tablet Take 25 mg by mouth three times daily     Historical Provider, MD   Oyster Shell 500 MG TABS Take 500 mg by mouth 2 times daily     Historical Provider, MD   clonazePAM (KLONOPIN) 0.5 MG tablet Take 0.5 mg by mouth 2 times daily. Taking 1/2 tablet BID    Historical Provider, MD   Acetaminophen 500 MG CAPS Take 1 tablet by mouth as needed for Pain (follow OTC instructions)    Historical Provider, MD   calcium carbonate (TUMS) 500 MG chewable tablet Take 1 tablet by mouth 3 times daily as needed for Heartburn    Historical Provider, MD   gabapentin (NEURONTIN) 300 MG capsule Take 300 mg by mouth 3 times daily. Historical Provider, MD   simvastatin (ZOCOR) 40 MG tablet Take 40 mg by mouth nightly. Historical Provider, MD        Allergies:     Prednisone; Compazine [prochlorperazine maleate]; Penicillin v potassium; and Penicillins    Social History:     Tobacco:    reports that she has been smoking cigarettes. She has a 50.00 pack-year smoking history. She has never used smokeless tobacco.  Alcohol:      reports no history of alcohol use. Drug Use:  reports no history of drug use. Family History:     Family History   Problem Relation Age of Onset    Cancer Mother         uterine    Heart Attack Mother     Heart Attack Father     Other Maternal Grandfather         foot gangrene    Other Paternal Grandmother         bleeding ulcers    Other Paternal Grandfather         lung cancer       Review of Systems:     Positive and Negative as described in HPI. Review of Systems   Constitutional: Positive for fatigue. Negative for appetite change, fever and unexpected weight change. HENT: Negative for congestion, rhinorrhea and sneezing. Eyes: Negative for visual disturbance.    Respiratory: Negative for cough, chest tightness, shortness of breath and wheezing. Cardiovascular: Negative for chest pain and palpitations. Gastrointestinal: Positive for abdominal pain, blood in stool, nausea and vomiting. Negative for constipation and diarrhea. Genitourinary: Negative for dysuria, enuresis, frequency and hematuria. Musculoskeletal: Negative for arthralgias and myalgias. Skin: Negative for rash. Neurological: Negative for dizziness, weakness, light-headedness and headaches. Hematological: Does not bruise/bleed easily. Psychiatric/Behavioral: Negative for dysphoric mood and sleep disturbance. Physical Exam:   /66   Pulse 96   Temp 98.5 °F (36.9 °C)   Resp 19   Wt 185 lb 9.6 oz (84.2 kg)   SpO2 97%   BMI 32.88 kg/m²   Temp (24hrs), Av.7 °F (37.1 °C), Min:98.1 °F (36.7 °C), Max:99.2 °F (37.3 °C)    Recent Labs     20  0410   POCGLU 126*       Intake/Output Summary (Last 24 hours) at 2020 1712  Last data filed at 2020 0530  Gross per 24 hour   Intake --   Output 200 ml   Net -200 ml     Physical Exam  Vitals signs and nursing note reviewed. Constitutional:       General: She is not in acute distress. Appearance: She is not diaphoretic. HENT:      Head: Normocephalic and atraumatic. Nose:      Right Sinus: No maxillary sinus tenderness or frontal sinus tenderness. Left Sinus: No maxillary sinus tenderness or frontal sinus tenderness. Mouth/Throat:      Pharynx: No oropharyngeal exudate. Eyes:      General: No scleral icterus. Conjunctiva/sclera: Conjunctivae normal.      Pupils: Pupils are equal, round, and reactive to light. Neck:      Musculoskeletal: Full passive range of motion without pain and neck supple. Thyroid: No thyromegaly. Vascular: No JVD. Cardiovascular:      Rate and Rhythm: Normal rate and regular rhythm. Pulses:           Dorsalis pedis pulses are 2+ on the right side and 2+ on the left side. Heart sounds: Normal heart sounds. No murmur.    Pulmonary: Effort: Pulmonary effort is normal.      Breath sounds: Normal breath sounds. No wheezing or rales. Abdominal:      Palpations: Abdomen is soft. There is no mass. Tenderness: There is generalized abdominal tenderness. Lymphadenopathy:      Head:      Right side of head: No submandibular adenopathy. Left side of head: No submandibular adenopathy. Cervical: No cervical adenopathy. Skin:     General: Skin is warm. Neurological:      Mental Status: She is alert and oriented to person, place, and time. Motor: No tremor. Psychiatric:         Behavior: Behavior is cooperative.          Investigations:      Laboratory Testing:  Recent Results (from the past 24 hour(s))   POC Glucose Fingerstick    Collection Time: 07/05/20  4:10 AM   Result Value Ref Range    POC Glucose 126 (H) 65 - 105 mg/dL   CBC Auto Differential    Collection Time: 07/05/20  4:28 AM   Result Value Ref Range    WBC 25.1 (H) 3.5 - 11.3 k/uL    RBC 3.20 (L) 3.95 - 5.11 m/uL    Hemoglobin 7.6 (L) 11.9 - 15.1 g/dL    Hematocrit 25.6 (L) 36.3 - 47.1 %    MCV 80.0 (L) 82.6 - 102.9 fL    MCH 23.8 (L) 25.2 - 33.5 pg    MCHC 29.7 28.4 - 34.8 g/dL    RDW 17.1 (H) 11.8 - 14.4 %    Platelets 277 672 - 594 k/uL    MPV 10.5 8.1 - 13.5 fL    NRBC Automated 0.0 0.0 per 100 WBC    Differential Type NOT REPORTED     WBC Morphology NOT REPORTED     RBC Morphology NOT REPORTED     Platelet Estimate NOT REPORTED     Immature Granulocytes 0 0 %    Seg Neutrophils 89 (H) 36 - 66 %    Lymphocytes 6 (L) 24 - 44 %    Monocytes 5 1 - 7 %    Eosinophils % 0 (L) 1 - 4 %    Basophils 0 0 - 2 %    Absolute Immature Granulocyte 0.00 0.00 - 0.30 k/uL    Segs Absolute 22.33 (H) 1.8 - 7.7 k/uL    Absolute Lymph # 1.51 1.0 - 4.8 k/uL    Absolute Mono # 1.26 (H) 0.1 - 0.8 k/uL    Absolute Eos # 0.00 0.0 - 0.4 k/uL    Basophils Absolute 0.00 0.0 - 0.2 k/uL    Morphology MICROCYTOSIS PRESENT     Morphology ANISOCYTOSIS PRESENT    Comprehensive Metabolic Panel    Collection Time: 07/05/20  4:28 AM   Result Value Ref Range    Glucose 128 (H) 70 - 99 mg/dL    BUN 21 8 - 23 mg/dL    CREATININE 0.64 0.50 - 0.90 mg/dL    Bun/Cre Ratio NOT REPORTED 9 - 20    Calcium 9.1 8.6 - 10.4 mg/dL    Sodium 134 (L) 135 - 144 mmol/L    Potassium 3.9 3.7 - 5.3 mmol/L    Chloride 96 (L) 98 - 107 mmol/L    CO2 25 20 - 31 mmol/L    Anion Gap 13 9 - 17 mmol/L    Alkaline Phosphatase 104 35 - 104 U/L    ALT 22 5 - 33 U/L    AST 22 <32 U/L    Total Bilirubin 0.19 (L) 0.3 - 1.2 mg/dL    Total Protein 6.1 (L) 6.4 - 8.3 g/dL    Alb 2.9 (L) 3.5 - 5.2 g/dL    Albumin/Globulin Ratio 0.9 (L) 1.0 - 2.5    GFR Non-African American >60 >60 mL/min    GFR African American >60 >60 mL/min    GFR Comment          GFR Staging NOT REPORTED    Troponin    Collection Time: 07/05/20  4:28 AM   Result Value Ref Range    Troponin, High Sensitivity 71 (HH) 0 - 14 ng/L    Troponin T NOT REPORTED <0.03 ng/mL    Troponin Interp NOT REPORTED    Lactic Acid, Whole Blood    Collection Time: 07/05/20  4:28 AM   Result Value Ref Range    Lactic Acid, Whole Blood 1.4 0.7 - 2.1 mmol/L   Lipase    Collection Time: 07/05/20  4:28 AM   Result Value Ref Range    Lipase 12 (L) 13 - 60 U/L   Magnesium    Collection Time: 07/05/20  4:28 AM   Result Value Ref Range    Magnesium 1.5 (L) 1.6 - 2.6 mg/dL   Phosphorus    Collection Time: 07/05/20  4:28 AM   Result Value Ref Range    Phosphorus 3.2 2.6 - 4.5 mg/dL   Amylase    Collection Time: 07/05/20  4:28 AM   Result Value Ref Range    Amylase 101 (H) 28 - 100 U/L   Procalcitonin    Collection Time: 07/05/20  4:28 AM   Result Value Ref Range    Procalcitonin 1.30 (H) <0.09 ng/mL   SPECIMEN REJECTION    Collection Time: 07/05/20  4:28 AM   Result Value Ref Range    Specimen Source . BLOOD     Ordered Test PFA     Reason for Rejection       Unable to perform testing: Specimen quantity not sufficient.    - NOT REPORTED    TYPE AND SCREEN    Collection Time: 07/05/20  4:33 AM   Result Value Ref Range    Expiration Date 07/08/2020,2359     Arm Band Number BE [id number]     ABO/Rh O POSITIVE     Antibody Screen NEGATIVE     Unit Number [id number]     Product Code Leukocyte Reduced Red Cell     Unit Divison 00     Dispense Status ISSUED     Transfusion Status OK TO TRANSFUSE     Crossmatch Result COMPATIBLE    Culture, Blood 1    Collection Time: 07/05/20  5:05 AM   Result Value Ref Range    Specimen Description . BLOOD     Special Requests LHAND 20ML     Culture NO GROWTH 7 HOURS    Culture, Blood 2    Collection Time: 07/05/20  5:10 AM   Result Value Ref Range    Specimen Description . BLOOD     Special Requests 20 RH     Culture NO GROWTH 7 HOURS    Urinalysis    Collection Time: 07/05/20  5:53 AM   Result Value Ref Range    Color, UA YELLOW YELLOW    Turbidity UA CLEAR CLEAR    Glucose, Ur NEGATIVE NEGATIVE    Bilirubin Urine NEGATIVE NEGATIVE    Ketones, Urine NEGATIVE NEGATIVE    Specific Gravity, UA 1.021 1.005 - 1.030    Urine Hgb NEGATIVE NEGATIVE    pH, UA 5.5 5.0 - 8.0    Protein, UA TRACE (A) NEGATIVE    Urobilinogen, Urine Normal Normal    Nitrite, Urine NEGATIVE NEGATIVE    Leukocyte Esterase, Urine SMALL (A) NEGATIVE    Urinalysis Comments NOT REPORTED    Microscopic Urinalysis    Collection Time: 07/05/20  5:53 AM   Result Value Ref Range    -          WBC, UA 5 TO 10 0 - 5 /HPF    RBC, UA 5 TO 10 0 - 4 /HPF    Casts UA  0 - 8 /LPF     5 TO 10 HYALINE Reference range defined for non-centrifuged specimen. Crystals, UA NOT REPORTED None /HPF    Epithelial Cells UA 2 TO 5 0 - 5 /HPF    Renal Epithelial, UA NOT REPORTED 0 /HPF    Bacteria, UA NOT REPORTED None    Mucus, UA NOT REPORTED None    Trichomonas, UA NOT REPORTED None    Amorphous, UA NOT REPORTED None    Other Observations UA NOT REPORTED NOT REQ. Yeast, UA NOT REPORTED None   SPECIMEN REJECTION    Collection Time: 07/05/20  5:53 AM   Result Value Ref Range    Specimen Source . BLOOD     Ordered Test PFA     Reason for prolapse) [I34.1]        Plan:     Patient status Admit as inpatient in the  Progressive Unit/Step down    1. Upper GI bleed - Protonix infusion, monitor H&H, GI consult. Strict NPO . 2. Anemia due to acute blood loss s/p 1 unit transfusion. Goal to keep HGB > 7g/dl. 3. Anxiety disorder - ativan as needed. 4. Chronic diastolic CHF - hold lasix for now . 5. H/o achalasia s/p laparoscopic robotic Nissen  Fundoplication        Code status discussed - does not want intubation . Consultations:   IP CONSULT TO GENERAL SURGERY  IP CONSULT TO CARDIOLOGY  IP CONSULT TO GI  IP CONSULT TO HOSPITALIST  IP CONSULT TO GI  IP CONSULT TO GI    Patient is admitted as inpatient status because of co-morbidities listed above, severity of signs and symptoms as outlined, requirement for current medical therapies and most importantly because of direct risk to patient if care not provided in a hospital setting.     Parker Torres MD  7/5/2020    Copy sent to Dr. Marcie Brennan MD    (Please note that portions of this note were completed with a voice recognition program. Efforts were made to edit the dictations but occasionally words are mis-transcribed.)

## 2020-07-05 NOTE — ED NOTES
Pt vomiting moderate amount of blood.  Dr Desiree Gold at the bedside     [facility], [facility]  07/05/20 5531

## 2020-07-05 NOTE — ED NOTES
[phone] pt's son.  Pt provided verbal authorization for her information to be released to her son over the phone     Temo Childers RN  07/05/20 6351

## 2020-07-05 NOTE — ED NOTES
Blue top tubes labeled and sent to lab for Platelet function test     Renny Corral RN  07/05/20 2609

## 2020-07-05 NOTE — ED NOTES
Pt resting on cot respirations even and unlabored, pt watching tv, waiting for bed placement on floor, protonix infusing     Laineyhenia Payment JUAREZ Gambino  07/05/20 4028

## 2020-07-05 NOTE — ED TRIAGE NOTES
Pt to ER with black tarry stool, coffee ground emesis since yesterday. Pt reside at [facility] in [facility]. States that she was constipated and the nurse gave a sup[pository yesterday. Shortly after the suppository, she had black tarry stool, abdominal pain and coffee ground emesis.  Pt alert and oiented x 4

## 2020-07-05 NOTE — ED NOTES
Pt placed on 4L O2 via NC.  Pt states that she normally wears oxygen but is unsure how much     Ariadna Luu RN  07/05/20 6100

## 2020-07-05 NOTE — ED NOTES
Bed: 08  Expected date: 7/5/20  Expected time: 3:49 AM  Means of arrival:   Comments:  ANAND Hastings RN  07/05/20 0515

## 2020-07-06 ENCOUNTER — ANESTHESIA (OUTPATIENT)
Dept: INPATIENT UNIT | Age: 75
DRG: 380 | End: 2020-07-06
Payer: MEDICARE

## 2020-07-06 ENCOUNTER — ANESTHESIA EVENT (OUTPATIENT)
Dept: ENDOSCOPY | Age: 75
DRG: 380 | End: 2020-07-06
Payer: MEDICARE

## 2020-07-06 ENCOUNTER — ANESTHESIA (OUTPATIENT)
Dept: ENDOSCOPY | Age: 75
DRG: 380 | End: 2020-07-06
Payer: MEDICARE

## 2020-07-06 ENCOUNTER — ANESTHESIA EVENT (OUTPATIENT)
Dept: INPATIENT UNIT | Age: 75
DRG: 380 | End: 2020-07-06
Payer: MEDICARE

## 2020-07-06 VITALS — DIASTOLIC BLOOD PRESSURE: 87 MMHG | SYSTOLIC BLOOD PRESSURE: 109 MMHG | OXYGEN SATURATION: 95 %

## 2020-07-06 LAB
ANION GAP SERPL CALCULATED.3IONS-SCNC: 10 MMOL/L (ref 9–17)
BUN BLDV-MCNC: 10 MG/DL (ref 8–23)
BUN/CREAT BLD: ABNORMAL (ref 9–20)
CALCIUM SERPL-MCNC: 8.3 MG/DL (ref 8.6–10.4)
CHLORIDE BLD-SCNC: 105 MMOL/L (ref 98–107)
CO2: 26 MMOL/L (ref 20–31)
CREAT SERPL-MCNC: 0.53 MG/DL (ref 0.5–0.9)
EKG ATRIAL RATE: 106 BPM
EKG P AXIS: 41 DEGREES
EKG P-R INTERVAL: 140 MS
EKG Q-T INTERVAL: 332 MS
EKG QRS DURATION: 74 MS
EKG QTC CALCULATION (BAZETT): 441 MS
EKG R AXIS: 41 DEGREES
EKG T AXIS: 22 DEGREES
EKG VENTRICULAR RATE: 106 BPM
GFR AFRICAN AMERICAN: >60 ML/MIN
GFR NON-AFRICAN AMERICAN: >60 ML/MIN
GFR SERPL CREATININE-BSD FRML MDRD: ABNORMAL ML/MIN/{1.73_M2}
GFR SERPL CREATININE-BSD FRML MDRD: ABNORMAL ML/MIN/{1.73_M2}
GLUCOSE BLD-MCNC: 93 MG/DL (ref 70–99)
HCT VFR BLD CALC: 22.6 % (ref 36.3–47.1)
HCT VFR BLD CALC: 23.3 % (ref 36.3–47.1)
HCT VFR BLD CALC: 25.8 % (ref 36.3–47.1)
HCT VFR BLD CALC: 26.2 % (ref 36.3–47.1)
HCT VFR BLD CALC: 26.3 % (ref 36.3–47.1)
HEMOGLOBIN: 6.9 G/DL (ref 11.9–15.1)
HEMOGLOBIN: 7 G/DL (ref 11.9–15.1)
HEMOGLOBIN: 7.9 G/DL (ref 11.9–15.1)
HEMOGLOBIN: 8 G/DL (ref 11.9–15.1)
HEMOGLOBIN: 8 G/DL (ref 11.9–15.1)
INR BLD: 1.1
IRON SATURATION: 8 % (ref 20–55)
IRON: 13 UG/DL (ref 37–145)
POTASSIUM SERPL-SCNC: 3.6 MMOL/L (ref 3.7–5.3)
PROTHROMBIN TIME: 11.7 SEC (ref 9–12)
SODIUM BLD-SCNC: 141 MMOL/L (ref 135–144)
TOTAL IRON BINDING CAPACITY: 165 UG/DL (ref 250–450)
UNSATURATED IRON BINDING CAPACITY: 152 UG/DL (ref 112–347)

## 2020-07-06 PROCEDURE — 87449 NOS EACH ORGANISM AG IA: CPT

## 2020-07-06 PROCEDURE — 93005 ELECTROCARDIOGRAM TRACING: CPT | Performed by: STUDENT IN AN ORGANIZED HEALTH CARE EDUCATION/TRAINING PROGRAM

## 2020-07-06 PROCEDURE — 2580000003 HC RX 258: Performed by: NURSE PRACTITIONER

## 2020-07-06 PROCEDURE — 6360000002 HC RX W HCPCS: Performed by: NURSE PRACTITIONER

## 2020-07-06 PROCEDURE — 3700000001 HC ADD 15 MINUTES (ANESTHESIA): Performed by: INTERNAL MEDICINE

## 2020-07-06 PROCEDURE — 2580000003 HC RX 258: Performed by: GENERAL PRACTICE

## 2020-07-06 PROCEDURE — 83550 IRON BINDING TEST: CPT

## 2020-07-06 PROCEDURE — 85610 PROTHROMBIN TIME: CPT

## 2020-07-06 PROCEDURE — 6360000002 HC RX W HCPCS: Performed by: FAMILY MEDICINE

## 2020-07-06 PROCEDURE — 6360000002 HC RX W HCPCS: Performed by: GENERAL PRACTICE

## 2020-07-06 PROCEDURE — 6370000000 HC RX 637 (ALT 250 FOR IP): Performed by: INTERNAL MEDICINE

## 2020-07-06 PROCEDURE — 99222 1ST HOSP IP/OBS MODERATE 55: CPT | Performed by: INTERNAL MEDICINE

## 2020-07-06 PROCEDURE — 2580000003 HC RX 258: Performed by: INTERNAL MEDICINE

## 2020-07-06 PROCEDURE — 7100000000 HC PACU RECOVERY - FIRST 15 MIN: Performed by: INTERNAL MEDICINE

## 2020-07-06 PROCEDURE — 3609017100 HC EGD: Performed by: INTERNAL MEDICINE

## 2020-07-06 PROCEDURE — 0DJ08ZZ INSPECTION OF UPPER INTESTINAL TRACT, VIA NATURAL OR ARTIFICIAL OPENING ENDOSCOPIC: ICD-10-PCS | Performed by: INTERNAL MEDICINE

## 2020-07-06 PROCEDURE — 36430 TRANSFUSION BLD/BLD COMPNT: CPT

## 2020-07-06 PROCEDURE — 6360000002 HC RX W HCPCS: Performed by: NURSE ANESTHETIST, CERTIFIED REGISTERED

## 2020-07-06 PROCEDURE — 86900 BLOOD TYPING SEROLOGIC ABO: CPT

## 2020-07-06 PROCEDURE — 85018 HEMOGLOBIN: CPT

## 2020-07-06 PROCEDURE — 2580000003 HC RX 258: Performed by: FAMILY MEDICINE

## 2020-07-06 PROCEDURE — 6370000000 HC RX 637 (ALT 250 FOR IP): Performed by: FAMILY MEDICINE

## 2020-07-06 PROCEDURE — 2060000000 HC ICU INTERMEDIATE R&B

## 2020-07-06 PROCEDURE — 7100000001 HC PACU RECOVERY - ADDTL 15 MIN: Performed by: INTERNAL MEDICINE

## 2020-07-06 PROCEDURE — 85014 HEMATOCRIT: CPT

## 2020-07-06 PROCEDURE — 80048 BASIC METABOLIC PNL TOTAL CA: CPT

## 2020-07-06 PROCEDURE — 93010 ELECTROCARDIOGRAM REPORT: CPT | Performed by: INTERNAL MEDICINE

## 2020-07-06 PROCEDURE — 6360000002 HC RX W HCPCS: Performed by: INTERNAL MEDICINE

## 2020-07-06 PROCEDURE — 3700000000 HC ANESTHESIA ATTENDED CARE: Performed by: INTERNAL MEDICINE

## 2020-07-06 PROCEDURE — C9113 INJ PANTOPRAZOLE SODIUM, VIA: HCPCS | Performed by: NURSE PRACTITIONER

## 2020-07-06 PROCEDURE — 99233 SBSQ HOSP IP/OBS HIGH 50: CPT | Performed by: FAMILY MEDICINE

## 2020-07-06 PROCEDURE — 83540 ASSAY OF IRON: CPT

## 2020-07-06 PROCEDURE — P9016 RBC LEUKOCYTES REDUCED: HCPCS

## 2020-07-06 PROCEDURE — 6360000002 HC RX W HCPCS: Performed by: ANESTHESIOLOGY

## 2020-07-06 PROCEDURE — 36415 COLL VENOUS BLD VENIPUNCTURE: CPT

## 2020-07-06 PROCEDURE — C9113 INJ PANTOPRAZOLE SODIUM, VIA: HCPCS | Performed by: GENERAL PRACTICE

## 2020-07-06 PROCEDURE — 43235 EGD DIAGNOSTIC BRUSH WASH: CPT | Performed by: INTERNAL MEDICINE

## 2020-07-06 RX ORDER — CLONAZEPAM 0.5 MG/1
0.5 TABLET ORAL 2 TIMES DAILY
Status: DISCONTINUED | OUTPATIENT
Start: 2020-07-06 | End: 2020-07-07 | Stop reason: HOSPADM

## 2020-07-06 RX ORDER — LABETALOL HYDROCHLORIDE 5 MG/ML
5 INJECTION, SOLUTION INTRAVENOUS EVERY 10 MIN PRN
Status: DISCONTINUED | OUTPATIENT
Start: 2020-07-06 | End: 2020-07-06

## 2020-07-06 RX ORDER — TRAZODONE HYDROCHLORIDE 100 MG/1
100 TABLET ORAL NIGHTLY
Status: DISCONTINUED | OUTPATIENT
Start: 2020-07-06 | End: 2020-07-07 | Stop reason: HOSPADM

## 2020-07-06 RX ORDER — FENTANYL CITRATE 50 UG/ML
25 INJECTION, SOLUTION INTRAMUSCULAR; INTRAVENOUS EVERY 5 MIN PRN
Status: DISCONTINUED | OUTPATIENT
Start: 2020-07-06 | End: 2020-07-06

## 2020-07-06 RX ORDER — QUETIAPINE FUMARATE 100 MG/1
100 TABLET, FILM COATED ORAL NIGHTLY
Status: DISCONTINUED | OUTPATIENT
Start: 2020-07-06 | End: 2020-07-07 | Stop reason: HOSPADM

## 2020-07-06 RX ORDER — METOCLOPRAMIDE HYDROCHLORIDE 5 MG/ML
10 INJECTION INTRAMUSCULAR; INTRAVENOUS EVERY 6 HOURS
Status: DISCONTINUED | OUTPATIENT
Start: 2020-07-06 | End: 2020-07-07 | Stop reason: HOSPADM

## 2020-07-06 RX ORDER — PANTOPRAZOLE SODIUM 40 MG/10ML
40 INJECTION, POWDER, LYOPHILIZED, FOR SOLUTION INTRAVENOUS 2 TIMES DAILY
Status: DISCONTINUED | OUTPATIENT
Start: 2020-07-06 | End: 2020-07-07 | Stop reason: HOSPADM

## 2020-07-06 RX ORDER — AMITRIPTYLINE HYDROCHLORIDE 25 MG/1
25 TABLET, FILM COATED ORAL 3 TIMES DAILY
Status: DISCONTINUED | OUTPATIENT
Start: 2020-07-06 | End: 2020-07-07 | Stop reason: HOSPADM

## 2020-07-06 RX ORDER — 0.9 % SODIUM CHLORIDE 0.9 %
20 INTRAVENOUS SOLUTION INTRAVENOUS ONCE
Status: COMPLETED | OUTPATIENT
Start: 2020-07-06 | End: 2020-07-06

## 2020-07-06 RX ORDER — GABAPENTIN 300 MG/1
300 CAPSULE ORAL 3 TIMES DAILY
Status: DISCONTINUED | OUTPATIENT
Start: 2020-07-06 | End: 2020-07-07 | Stop reason: HOSPADM

## 2020-07-06 RX ORDER — OXYCODONE HYDROCHLORIDE AND ACETAMINOPHEN 5; 325 MG/1; MG/1
1 TABLET ORAL PRN
Status: DISCONTINUED | OUTPATIENT
Start: 2020-07-06 | End: 2020-07-06

## 2020-07-06 RX ORDER — DIPHENHYDRAMINE HYDROCHLORIDE 50 MG/ML
12.5 INJECTION INTRAMUSCULAR; INTRAVENOUS
Status: DISCONTINUED | OUTPATIENT
Start: 2020-07-06 | End: 2020-07-06

## 2020-07-06 RX ORDER — SODIUM CHLORIDE 9 MG/ML
10 INJECTION INTRAVENOUS 2 TIMES DAILY
Status: DISCONTINUED | OUTPATIENT
Start: 2020-07-06 | End: 2020-07-07 | Stop reason: HOSPADM

## 2020-07-06 RX ORDER — METOPROLOL SUCCINATE 25 MG/1
25 TABLET, EXTENDED RELEASE ORAL DAILY
Status: DISCONTINUED | OUTPATIENT
Start: 2020-07-06 | End: 2020-07-07 | Stop reason: HOSPADM

## 2020-07-06 RX ORDER — POTASSIUM CHLORIDE 7.45 MG/ML
10 INJECTION INTRAVENOUS
Status: DISPENSED | OUTPATIENT
Start: 2020-07-06 | End: 2020-07-06

## 2020-07-06 RX ORDER — BUSPIRONE HYDROCHLORIDE 10 MG/1
20 TABLET ORAL 3 TIMES DAILY
Status: DISCONTINUED | OUTPATIENT
Start: 2020-07-06 | End: 2020-07-07 | Stop reason: HOSPADM

## 2020-07-06 RX ORDER — FUROSEMIDE 20 MG/1
20 TABLET ORAL DAILY
Status: DISCONTINUED | OUTPATIENT
Start: 2020-07-06 | End: 2020-07-07 | Stop reason: HOSPADM

## 2020-07-06 RX ORDER — OXYCODONE HYDROCHLORIDE AND ACETAMINOPHEN 5; 325 MG/1; MG/1
2 TABLET ORAL PRN
Status: DISCONTINUED | OUTPATIENT
Start: 2020-07-06 | End: 2020-07-06

## 2020-07-06 RX ORDER — PROPOFOL 10 MG/ML
INJECTION, EMULSION INTRAVENOUS PRN
Status: DISCONTINUED | OUTPATIENT
Start: 2020-07-06 | End: 2020-07-06 | Stop reason: SDUPTHER

## 2020-07-06 RX ORDER — ESCITALOPRAM OXALATE 10 MG/1
20 TABLET ORAL DAILY
Status: DISCONTINUED | OUTPATIENT
Start: 2020-07-06 | End: 2020-07-07 | Stop reason: HOSPADM

## 2020-07-06 RX ORDER — LINEZOLID 2 MG/ML
600 INJECTION, SOLUTION INTRAVENOUS EVERY 12 HOURS
Status: DISCONTINUED | OUTPATIENT
Start: 2020-07-06 | End: 2020-07-07 | Stop reason: HOSPADM

## 2020-07-06 RX ORDER — MORPHINE SULFATE 2 MG/ML
2 INJECTION, SOLUTION INTRAMUSCULAR; INTRAVENOUS EVERY 5 MIN PRN
Status: DISCONTINUED | OUTPATIENT
Start: 2020-07-06 | End: 2020-07-06

## 2020-07-06 RX ORDER — ONDANSETRON 2 MG/ML
4 INJECTION INTRAMUSCULAR; INTRAVENOUS
Status: COMPLETED | OUTPATIENT
Start: 2020-07-06 | End: 2020-07-06

## 2020-07-06 RX ORDER — MAGNESIUM SULFATE 1 G/100ML
1 INJECTION INTRAVENOUS
Status: DISPENSED | OUTPATIENT
Start: 2020-07-06 | End: 2020-07-06

## 2020-07-06 RX ADMIN — Medication 10 ML: at 21:21

## 2020-07-06 RX ADMIN — PANTOPRAZOLE SODIUM 40 MG: 40 INJECTION, POWDER, FOR SOLUTION INTRAVENOUS at 21:21

## 2020-07-06 RX ADMIN — SODIUM CHLORIDE 8 MG/HR: 9 INJECTION, SOLUTION INTRAVENOUS at 02:37

## 2020-07-06 RX ADMIN — LORAZEPAM 0.5 MG: 2 INJECTION INTRAMUSCULAR; INTRAVENOUS at 02:17

## 2020-07-06 RX ADMIN — LORAZEPAM 0.5 MG: 2 INJECTION INTRAMUSCULAR; INTRAVENOUS at 15:56

## 2020-07-06 RX ADMIN — METOPROLOL SUCCINATE 25 MG: 25 TABLET, FILM COATED, EXTENDED RELEASE ORAL at 19:46

## 2020-07-06 RX ADMIN — ACETAMINOPHEN 650 MG: 650 SUPPOSITORY RECTAL at 02:17

## 2020-07-06 RX ADMIN — GABAPENTIN 300 MG: 300 CAPSULE ORAL at 21:22

## 2020-07-06 RX ADMIN — TRAZODONE HYDROCHLORIDE 100 MG: 100 TABLET ORAL at 21:22

## 2020-07-06 RX ADMIN — ONDANSETRON 4 MG: 2 INJECTION INTRAMUSCULAR; INTRAVENOUS at 14:26

## 2020-07-06 RX ADMIN — METOCLOPRAMIDE 10 MG: 5 INJECTION, SOLUTION INTRAMUSCULAR; INTRAVENOUS at 19:46

## 2020-07-06 RX ADMIN — PROPOFOL 10 MG: 10 INJECTION, EMULSION INTRAVENOUS at 13:22

## 2020-07-06 RX ADMIN — QUETIAPINE FUMARATE 100 MG: 100 TABLET ORAL at 21:22

## 2020-07-06 RX ADMIN — BUSPIRONE HYDROCHLORIDE 20 MG: 10 TABLET ORAL at 21:22

## 2020-07-06 RX ADMIN — PROPOFOL 100 MG: 10 INJECTION, EMULSION INTRAVENOUS at 13:18

## 2020-07-06 RX ADMIN — LINEZOLID 600 MG: 600 INJECTION, SOLUTION INTRAVENOUS at 16:02

## 2020-07-06 RX ADMIN — CLONAZEPAM 0.5 MG: 0.5 TABLET ORAL at 19:47

## 2020-07-06 RX ADMIN — PANTOPRAZOLE SODIUM 40 MG: 40 INJECTION, POWDER, FOR SOLUTION INTRAVENOUS at 17:27

## 2020-07-06 RX ADMIN — SODIUM CHLORIDE: 9 INJECTION, SOLUTION INTRAVENOUS at 15:55

## 2020-07-06 RX ADMIN — MAGNESIUM SULFATE HEPTAHYDRATE 1 G: 1 INJECTION, SOLUTION INTRAVENOUS at 11:59

## 2020-07-06 RX ADMIN — Medication 10 ML: at 17:27

## 2020-07-06 RX ADMIN — SODIUM CHLORIDE 20 ML: 9 INJECTION, SOLUTION INTRAVENOUS at 05:24

## 2020-07-06 RX ADMIN — POTASSIUM CHLORIDE 10 MEQ: 7.46 INJECTION, SOLUTION INTRAVENOUS at 12:03

## 2020-07-06 RX ADMIN — Medication 10 ML: at 08:47

## 2020-07-06 RX ADMIN — FUROSEMIDE 20 MG: 20 TABLET ORAL at 19:52

## 2020-07-06 RX ADMIN — PROPOFOL 10 MG: 10 INJECTION, EMULSION INTRAVENOUS at 13:24

## 2020-07-06 RX ADMIN — AMITRIPTYLINE HYDROCHLORIDE 25 MG: 25 TABLET, FILM COATED ORAL at 19:46

## 2020-07-06 RX ADMIN — SODIUM CHLORIDE: 9 INJECTION, SOLUTION INTRAVENOUS at 01:45

## 2020-07-06 RX ADMIN — ESCITALOPRAM OXALATE 20 MG: 10 TABLET ORAL at 19:46

## 2020-07-06 RX ADMIN — SODIUM CHLORIDE: 9 INJECTION, SOLUTION INTRAVENOUS at 08:45

## 2020-07-06 RX ADMIN — LORAZEPAM 0.5 MG: 2 INJECTION INTRAMUSCULAR; INTRAVENOUS at 08:47

## 2020-07-06 RX ADMIN — PROPOFOL 10 MG: 10 INJECTION, EMULSION INTRAVENOUS at 13:20

## 2020-07-06 ASSESSMENT — ENCOUNTER SYMPTOMS
VOMITING: 0
DIARRHEA: 0
ABDOMINAL PAIN: 0
SHORTNESS OF BREATH: 1
ABDOMINAL PAIN: 1
SHORTNESS OF BREATH: 0
COUGH: 0
VOMITING: 1
BLOOD IN STOOL: 1
CHEST TIGHTNESS: 0
COLOR CHANGE: 0
RHINORRHEA: 0
CONSTIPATION: 0
NAUSEA: 1
WHEEZING: 0

## 2020-07-06 ASSESSMENT — PAIN SCALES - GENERAL
PAINLEVEL_OUTOF10: 6
PAINLEVEL_OUTOF10: 4
PAINLEVEL_OUTOF10: 0
PAINLEVEL_OUTOF10: 6
PAINLEVEL_OUTOF10: 0

## 2020-07-06 ASSESSMENT — PULMONARY FUNCTION TESTS
PIF_VALUE: 0

## 2020-07-06 ASSESSMENT — COPD QUESTIONNAIRES: CAT_SEVERITY: MODERATE

## 2020-07-06 ASSESSMENT — LIFESTYLE VARIABLES: SMOKING_STATUS: 1

## 2020-07-06 ASSESSMENT — PAIN - FUNCTIONAL ASSESSMENT: PAIN_FUNCTIONAL_ASSESSMENT: 0-10

## 2020-07-06 NOTE — PLAN OF CARE
Problem: Falls - Risk of:  Goal: Will remain free from falls  Description: Will remain free from falls  Outcome: Met This Shift  Goal: Absence of physical injury  Description: Absence of physical injury  Outcome: Met This Shift   Patient remained free of falls during shift. Bed in lowest position. Bed breaks locked. 3/4 side rails up. Bed alarm on. Call light and bed side table within reach. Patient calls out appropriately. Continue to monitor.

## 2020-07-06 NOTE — PROGRESS NOTES
Ready to leave. Pt states having a stooll; large semi-formed stool; pt cleaned and brief changed; Ready to leave & pt c/o nausea \"I'm gonna throw-up! \" put alcohol swab under nose - no longer nauseated - zofran given prior to transfer - continues to deny nausea or vomiting.   1430 to room per PCT

## 2020-07-06 NOTE — PROGRESS NOTES
[facility] [place]      [place] Progress Note     Admit Date: 7/5/2020  Bed/Room No.  3012/3012-01  Admitting Physician : Finn Bear MD  Code Status :4517 [facility] Day:  LOS: 1 day   Chief Complaint:     Chief Complaint   Patient presents with    Abdominal Pain    Nausea & Vomiting     Principal Problem:    Acute upper GI bleed  Active Problems:    MVP (mitral valve prolapse)    Paraesophageal hernia    Centrilobular emphysema ([address]    H/O gastric ulcer    Chronic respiratory failure with hypoxia (HCC)    S/P Nissen fundoplication (without gastrostomy tube) procedure    Acute blood loss anemia  Resolved Problems:    * No resolved hospital problems. *    Subjective : Interval History/Significant events :  07/06/20    Patient is having blood in stool and nausea. No hematemesis . She is stable on supplemental oxygen . Denies any chest pain, palpitations . Patient received total 2 units PRBC. Vitals - Stable afebrile  Labs - low HGB     Nursing notes , Consults notes reviewed. Overnight events and updates discussed with Nursing staff . Background History:         Lefty Kitchen is 76 y.o. female  Who was admitted to the hospital on 7/5/2020 for treatment of Acute upper GI bleed. Patient came to emergency room by ambulance after she had nausea, abdominal pain for 1 day. Patient has been having constipation for 3 days and was given enema. Patient endorsed black stools. She also was feeling weak and fatigued. Patient also reports generalized abd pain. Patient has underlying history of hypertension, hyperlipidemia, gastric ulcer, laparoscopy robotic gastric fundoplication 8/29/3143 for achalasia , solitary kidney. She had upper GI bleed secondary ulcer at GE junction about 4 months ago. Patient also developed acute hypoxic respiratory failure after discharge from hospital  post gastric surgery and required mechanical ventilation.   She also had recent cholecystectomy in May 2020 and required  CBD obstruction required CBD stenting prior to cholecystectomy. She had been at [facility].      Initial evaluation showed fever 100.4 , tachycardia 106,  hyponatremia 134, normal kidney function, severely low hemoglobin 7.6 microcytic, hypochromic, leukocytosis 24.1, lymphopenia 6, chest x-ray showed bilateral pulmonary opacities. Patient was treated with protonix infusion and received 2 unit PRBC transfusion so far. PMH:  Past Medical History:   Diagnosis Date    Anxiety     Arthritis     Back pain     Bronchitis     Caffeine use     8 coffee / day    Carpal tunnel syndrome     Cataracts, bilateral     Constipation     Depression     Diarrhea     GERD (gastroesophageal reflux disease)     H/O gastric ulcer     Hyperlipidemia     Hypertension     Mumps     MVP (mitral valve prolapse)     Dr. Daly White in May 2019    Recurrent UTI     Dr. Esvin Frost    Sinusitis     Trache[facility] in place [facility])     Ulcerative esophagitis     Wellness examination     Dr. Luz Carrera seen in Jan 2020      Allergies: Allergies   Allergen Reactions    Prednisone Anxiety    Compazine [Prochlorperazine Maleate]     Penicillin V Potassium     Penicillins Other (See Comments)     shock      Medications :  sodium chloride, 500 mL, Intravenous, Once  enoxaparin, 40 mg, Subcutaneous, Daily  sodium chloride flush, 10 mL, Intravenous, 2 times per day        Review of Systems   Review of Systems   Constitutional: Positive for fatigue. Negative for appetite change, fever and unexpected weight change. HENT: Negative for congestion, rhinorrhea and sneezing. Eyes: Negative for visual disturbance. Respiratory: Negative for cough, chest tightness, shortness of breath and wheezing. Cardiovascular: Negative for chest pain and palpitations. Gastrointestinal: Positive for blood in stool and nausea. Negative for abdominal pain, constipation, diarrhea and vomiting.    Genitourinary: Negative for dysuria, enuresis, frequency and hematuria. Musculoskeletal: Negative for arthralgias and myalgias. Skin: Negative for rash. Neurological: Negative for dizziness, weakness, light-headedness and headaches. Hematological: Does not bruise/bleed easily. Psychiatric/Behavioral: Negative for dysphoric mood and sleep disturbance.      Objective :      Current Vitals : Temp: 98.5 °F (36.9 °C),  Pulse: 91, Resp: 17, BP: 120/60, SpO2: 95 %  Last 24 Hrs Vitals   Patient Vitals for the past 24 hrs:   BP Temp Temp src Pulse Resp SpO2 Height Weight   07/06/20 0630 120/60 98.5 °F (36.9 °C) Oral 91 17 95 % -- --   07/06/20 0625 129/62 98.1 °F (36.7 °C) Oral 95 18 98 % -- --   07/06/20 0559 -- -- -- -- -- -- -- 191 lb 9.3 oz (86.9 kg)   07/06/20 0555 (!) 109/58 98.5 °F (36.9 °C) Oral 90 24 99 % -- --   07/06/20 0540 122/65 98.7 °F (37.1 °C) Oral 87 21 96 % -- --   07/06/20 0535 114/65 98.3 °F (36.8 °C) Oral 89 23 96 % -- --   07/06/20 0530 (!) 112/58 97.8 °F (36.6 °C) Oral 87 20 98 % -- --   07/06/20 0525 (!) 115/57 98.1 °F (36.7 °C) Oral 87 29 98 % -- --   07/06/20 0524 (!) 115/57 98.1 °F (36.7 °C) -- 87 30 99 % -- --   07/06/20 0512 (!) 117/58 98 °F (36.7 °C) Oral 84 25 98 % -- --   07/06/20 0400 (!) 112/59 98.8 °F (37.1 °C) Oral 86 25 98 % -- --   07/06/20 0000 (!) 125/57 98.7 °F (37.1 °C) Oral 91 28 96 % -- --   07/05/20 1957 (!) 122/50 99.1 °F (37.3 °C) Oral 96 19 96 % -- --   07/05/20 1900 (!) 127/51 -- -- 99 21 94 % -- --   07/05/20 1814 (!) 141/65 100.4 °F (38 °C) Oral 106 23 94 % 5' 3\" (1.6 m) 183 lb 10.3 oz (83.3 kg)   07/05/20 1725 136/64 -- -- 99 21 97 % -- --   07/05/20 1620 119/66 -- -- 96 19 97 % -- --   07/05/20 1605 132/61 -- -- 95 25 95 % -- --   07/05/20 1430 128/65 -- -- 93 20 91 % -- --   07/05/20 1420 (!) 126/59 -- -- 96 19 91 % -- --   07/05/20 1405 (!) 117/58 -- -- 89 23 98 % -- --   07/05/20 1350 -- 98.5 °F (36.9 °C) -- -- -- -- -- --   07/05/20 1120 (!) 110/55 -- -- 89 20 99 % -- --   07/05/20 1110 (!) 119/55 99.2 °F (37.3 °C) -- 94 20 96 % -- --   07/05/20 1104 (!) 113/91 99.1 °F (37.3 °C) Oral 92 18 96 % -- --   07/05/20 1057 (!) 120/52 98.1 °F (36.7 °C) Oral 91 16 95 % -- --   07/05/20 1053 122/66 98.2 °F (36.8 °C) -- 91 16 -- -- --     Intake / output   07/05 0701 - 07/06 0700  In: 1757 [I.V.:1482]  Out: 700 [Urine:700]  Physical Exam:  Physical Exam  Vitals signs and nursing note reviewed. Constitutional:       General: She is not in acute distress. Appearance: She is not diaphoretic. Interventions: Nasal cannula in place. HENT:      Head: Normocephalic and atraumatic. Nose:      Right Sinus: No maxillary sinus tenderness or frontal sinus tenderness. Left Sinus: No maxillary sinus tenderness or frontal sinus tenderness. Mouth/Throat:      Pharynx: No oropharyngeal exudate. Eyes:      General: No scleral icterus. Conjunctiva/sclera: Conjunctivae normal.      Pupils: Pupils are equal, round, and reactive to light. Neck:      Musculoskeletal: Full passive range of motion without pain and neck supple. Thyroid: No thyromegaly. Vascular: No JVD. Cardiovascular:      Rate and Rhythm: Normal rate and regular rhythm. Pulses:           Dorsalis pedis pulses are 2+ on the right side and 2+ on the left side. Heart sounds: Normal heart sounds. No murmur. Pulmonary:      Effort: Pulmonary effort is normal.      Breath sounds: Normal breath sounds. No wheezing or rales. Abdominal:      Palpations: Abdomen is soft. There is no mass. Tenderness: There is abdominal tenderness in the epigastric area. Lymphadenopathy:      Head:      Right side of head: No submandibular adenopathy. Left side of head: No submandibular adenopathy. Cervical: No cervical adenopathy. Skin:     General: Skin is warm. Neurological:      Mental Status: She is alert and oriented to person, place, and time. Motor: No tremor.    Psychiatric:         Behavior: Behavior is cooperative. Laboratory findings:    Recent Labs     07/05/20 0428 07/05/20  0633  07/05/20 2012 07/06/20  0020 07/06/20 0428   WBC 25.1*  --   --   --   --   --    HGB 7.6*  --    < > 7.3* 7.0* 6.9*   HCT 25.6*  --    < > 23.5* 23.3* 22.6*     --   --   --   --   --    INR  --  1.2  --   --   --   --     < > = values in this interval not displayed. Recent Labs     07/05/20 0428 07/06/20 0428   * 141   K 3.9 3.6*   CL 96* 105   CO2 25 26   GLUCOSE 128* 93   BUN 21 10   CREATININE 0.64 0.53   MG 1.5*  --    CALCIUM 9.1 8.3*   PHOS 3.2  --      Recent Labs     07/05/20 0428   PROT 6.1*   LABALBU 2.9*   AST 22   ALT 22   ALKPHOS 104   BILITOT 0.19*   AMYLASE 101*   LIPASE 12*          Specific Gravity, UA   Date Value Ref Range Status   07/05/2020 1.021 1.005 - 1.030 Final     Protein, UA   Date Value Ref Range Status   07/05/2020 TRACE (A) NEGATIVE Final     RBC, UA   Date Value Ref Range Status   07/05/2020 5 TO 10 0 - 4 /HPF Final     Comment:     Reference range defined for non-centrifuged specimen. Blood, UA POC   Date Value Ref Range Status   06/29/2018 Non-hemolyzed trace  Final     Bacteria, UA   Date Value Ref Range Status   07/05/2020 NOT REPORTED None Final     Nitrite, Urine   Date Value Ref Range Status   07/05/2020 NEGATIVE NEGATIVE Final     WBC, UA   Date Value Ref Range Status   07/05/2020 5 TO 10 0 - 5 /HPF Final     Leukocyte Esterase, Urine   Date Value Ref Range Status   07/05/2020 SMALL (A) NEGATIVE Final       Imaging / Clinical Data :-   Xr Acute Abd Series Chest 1 Vw    Result Date: 7/5/2020  Bilateral heterogeneous pulmonary opacities with left basilar consolidation. Differential considerations include atelectasis, asymmetric pulmonary edema and an infectious/inflammatory process. Small left pleural effusion. No intraperitoneal free air. Nonobstructive bowel gas pattern. Gastrostomy tube overlies the left upper quadrant.         Clinical Course : stable  Assessment and Plan  :        1. Upper GI bleed - Protonix infusion, monitor H&H, GI following and plan for EGD today. Strict NPO . 2. Anemia due to acute blood loss s/p 2 unit transfusion. Goal to keep HGB > 7g/dl. 3. Anxiety disorder - ativan as needed. 4. Chronic diastolic CHF - hold lasix for now . 5. H/o achalasia s/p laparoscopic robotic Nissen  Fundoplication  6. Elevated trops - Discussed with cardiologist Dr Cunningham Mason , EKG normal , no angina , patient is OK to go for EGD . Correct K and magnesium . GI notified. 7. Suspected COVID - ruled out with negative SARS CoV2 PCR . Transfer out of isolation unit . Remove droplet plus isolation.          Continue to monitor vitals , Intake / output ,  Cell count , HGB , Kidney function, oxygenation  as indicated . Plan and updates discussed with patient ,  answers  explained to satisfaction.    Plan discussed with Staff Fred Murcia RN     (Please note that portions of this note were completed with a voice recognition program. Efforts were made to edit the dictations but occasionally words are mis-transcribed.)      Adeline Martin MD  7/6/2020

## 2020-07-06 NOTE — PROGRESS NOTES
Writing RN updated Abran Asif, pt.'s son and gave him an update on how the pt. Is doing today. Explained that the pt. Needs an Echo prior to the pt. Receiving an EGD. I explained that the  was currently on the phone with the pt. And explained that since she is able to make her own decisions at this time so that is who the  will be speaking to about discharge planning. Abran Asif then explained that he understood. All questions and concerns answered.

## 2020-07-06 NOTE — PLAN OF CARE
Problem: Skin Integrity:  Goal: Will show no infection signs and symptoms  Description: Will show no infection signs and symptoms  Outcome: Ongoing  Goal: Absence of new skin breakdown  Description: Absence of new skin breakdown  Outcome: Ongoing     Problem: Falls - Risk of:  Goal: Will remain free from falls  Description: Will remain free from falls  Outcome: Ongoing  Goal: Absence of physical injury  Description: Absence of physical injury  Outcome: Ongoing     Problem: Pain:  Goal: Pain level will decrease  Description: Pain level will decrease  Outcome: Ongoing  Goal: Control of acute pain  Description: Control of acute pain  Outcome: Ongoing  Goal: Control of chronic pain  Description: Control of chronic pain  Outcome: Ongoing

## 2020-07-06 NOTE — CARE COORDINATION
Case Management Initial Discharge Plan  Ponce Reynoso,             Met with:patient to discuss discharge plans. Information verified: address, contacts, phone number, , insurance Yes    Emergency Contact/Next of Kin name & number: Shane Mendoza [phone]    PCP: Levon Mccoy MD  Date of last visit: 6 months ago    Insurance Provider: Medicare and [address] [place] [address] plan    Discharge Planning    Living Arrangements:  [place]:  Other (Comment)    Home has 1 stories  8 front 0 back stairs to climb to get into front door, n/a stairs to climb to reach second floor  Location of bedroom/bathroom in home main    Patient able to perform ADL's:Independent    Current Services (outpatient & in home) none  DME equipment: walker and cane  DME provider: independent    Receiving oral anticoagulation therapy? No    If indicated:   Physician managing anticoagulation treatment: n/a  Where does patient obtain lab work for ATC treatment? n/a      Potential Assistance Needed:  N/A    Patient agreeable to home care: Yes  Freedom of choice provided:  yes    Prior SNF/Rehab Placement and Facility: [facility] [place] [address] to SNF/Rehab: Yes  [facility] of choice provided: yes     Evaluation: n/a    Expected Discharge date:  07/10/20    Patient expects to be discharged to:  Nursing Home  Follow Up Appointment: Best Day/ Time:      Transportation provider: Gabrielle Jimenez  Transportation arrangements needed for discharge: Yes    Readmission Risk              Risk of Unplanned Readmission:        27             Does patient have a readmission risk score greater than 14?: Yes  If yes, follow-up appointment must be made within 7 days of discharge. Goals of Care: improve blood counts      Discharge Plan: Referral to [facility] (sent here from [facility]). Called admissions and left  for return call.            Electronically signed by Marleny Guzman RN on 20 at 9:59 AM EDT

## 2020-07-06 NOTE — PROCEDURES
Indication for procedure/Preop Diagnosis:  Coffee Ground Emesis                                                                        Anemia    Findings/Post Op Diagnosis:  Duodenitis                                                   Gastritis                                                     Esophagitis with ulceration    Procedure:  EGD    Endoscopist:  Yung Terrazas    Pre Op evaluation:    ASA:  IV  Mallampati:  II    Medications:  Per anesthesia    Procedure:    1. The exam was completed to the second portion of the duodenum. 2. The second portion of the duodenum appeared normal.    3. The duodenal bulb and first portion showed inflammatory changes (moderate) with edema. 4. The stomach showed some mild inflammatory changes of the gastric antrum and distal body. 5. Retroflexed view in the stomach was normal.    6. The esophagus showed severe inflammation with ulceration - LA Class C extending from the GE junction at about 39 cm proximally to about 31 cm from the lip line. There was free reflux of gastric bile seen to occur into the esophagus but no blood or bleeding seen. 7. The very proximal esophagus appeared normal.    Estimated blood loss - minimal    Recommendations:    BID PPI    HOB elevated at 45 degrees at all times    Given severity may ultimately benefit from anti-reflux surgery. Should have OP GI follow up after treatment with repeat EGD in 8-12 weeks.

## 2020-07-06 NOTE — CONSULTS
[facility]    Inpatient Consultation Note               Today's Date: 7/6/2020  Patient Name: Darryl Singh  Date of admission: 7/5/2020  3:57 AM  Patient's age: 76 y.o., 1945  Admission Dx: Acute blood loss anemia [D62]  Acute blood loss anemia [D62]    Reason for  Consult:  Elevated troponin    Requesting Physician: Nigel Elizabeth MD    CHIEF COMPLAINT:     Chief Complaint   Patient presents with    Abdominal Pain    Nausea & Vomiting       History Obtained From:  patient, electronic medical record    HISTORY OF PRESENT ILLNESS:      The patient is a 76 y.o. female who is admitted to the hospital for acute on chronic anemia. Her last echocardiogram was in February/2020 with ejection fraction 68% and pulmonary hypertension and no significant valvular disease. She has a long complicated course in the hospital recently with robotic nissen fundoplication and hiatal hernia repair complicated by extended ICU stay with trach and PEG placement. He presented this time with nausea and coffee-ground emesis. She also reported abdominal pain as well as melanotic stool. On admission her hemoglobin was 7.6 and she is undergoing EGD today. Troponins were 71/83 thus cardiology team got consulted. Denies any chest pain or worsening shortness of breath. No diaphoresis. No palpitation or syncope. EKG showed NSR     Past Medical History:   has a past medical history of Anxiety, Arthritis, Back pain, Bronchitis, Caffeine use, Carpal tunnel syndrome, Cataracts, bilateral, Constipation, Depression, Diarrhea, GERD (gastroesophageal reflux disease), H/O gastric ulcer, Hyperlipidemia, Hypertension, Mumps, MVP (mitral valve prolapse), Recurrent UTI, Sinusitis, Tracheostomy in place [facility]), Ulcerative esophagitis, and Wellness examination. Past Surgical History:   has a past surgical history that includes Hysterectomy; total nephrectomy; Tonsillectomy; Appendectomy; Cystoscopy;  Ovary removal; Tubal ligation; rhinoplasty; Carpal tunnel release; Hand surgery; Total hip arthroplasty; eye surgery; Colonoscopy; joint replacement (Right); Gastric fundoplication (N/A, 0/82/7168); hc cath power picc triple (3/4/2020); EGD (3/17/2020); Upper gastrointestinal endoscopy (N/A, 3/17/2020); tracheostomy (N/A, 3/27/2020); Gastrostomy tube placement (N/A, 3/27/2020); tracheostomy (N/A, 4/2/2020); Upper gastrointestinal endoscopy (N/A, 5/18/2020); Upper gastrointestinal endoscopy (5/18/2020); ERCP (05/21/2020); Cholecystectomy, laparoscopic (05/22/2020); ERCP (5/21/2020); ERCP (5/21/2020); ERCP (5/21/2020); and Cholecystectomy, laparoscopic (N/A, 5/22/2020). Home Medications:    Prior to Admission medications    Medication Sig Start Date End Date Taking?  Authorizing Provider   midodrine (PROAMATINE) 5 MG tablet Take 1 tablet by mouth 3 times daily as needed (if SBP < 100) 6/1/20   Toma White MD   metoprolol succinate (TOPROL XL) 25 MG extended release tablet Take 1 tablet by mouth daily 5/30/20   Toma White MD   busPIRone (BUSPAR) 10 MG tablet Take 2 tablets by mouth 3 times daily 4/6/20   Danita Sevilla MD   escitalopram (LEXAPRO) 20 MG tablet Take 1 tablet by mouth daily 4/6/20   Danita Sevilla MD   QUEtiapine (SEROQUEL) 100 MG tablet Take 1 tablet by mouth nightly 4/6/20   Danita Sevilla MD   furosemide (LASIX) 20 MG tablet Take 1 tablet by mouth daily 4/6/20   Danita Sevilla MD   docusate (COLACE) 50 MG/5ML liquid 10 mLs by Per G Tube route 2 times daily 4/6/20   Danita Sevilla MD   metoclopramide (REGLAN) 5 MG/ML injection Infuse 2 mLs intravenously every 6 hours 4/6/20   Danita Sevilla MD   sucralfate (CARAFATE) 1 GM tablet Take 1 tablet by mouth 4 times daily 4/6/20   Danita Sevilla MD   traZODone (DESYREL) 100 MG tablet Take 1 tablet by mouth nightly 4/6/20   Danita Sevilla MD   pantoprazole (PROTONIX) 40 MG injection Infuse 40 mg intravenously significant pericardial effusion is seen. Stress Test:   N/A  Cardiac Angiography:   N/A    Labs:     CBC:   Recent Labs     07/05/20 0428 07/06/20  0020 07/06/20 0428   WBC 25.1*  --   --   --    HGB 7.6*   < > 7.0* 6.9*   HCT 25.6*   < > 23.3* 22.6*     --   --   --     < > = values in this interval not displayed. BMP:   Recent Labs     07/05/20 0428 07/06/20 0428   * 141   K 3.9 3.6*   CO2 25 26   BUN 21 10   CREATININE 0.64 0.53   LABGLOM >60 >60   GLUCOSE 128* 93     PT/INR:   Recent Labs     07/05/20  0633   PROTIME 12.1*   INR 1.2     APT  Recent Labs     07/05/20 0428 07/05/20  1007   TROPONINT NOT REPORTED NOT REPORTED       FASTING LIPID PANEL:  Lab Results   Component Value Date    TRIG 200 05/26/2020     LIVER PROFILE:  Recent Labs     07/05/20 0428   AST 22   ALT 22   LABALBU 2.9*         Patient's Active Problem List  Principal Problem:    Acute upper GI bleed  Active Problems:    MVP (mitral valve prolapse)    Paraesophageal hernia    Centrilobular emphysema (HCC)    H/O gastric ulcer    Chronic respiratory failure with hypoxia (HCC)    S/P Nissen fundoplication (without gastrostomy tube) procedure    Acute blood loss anemia  Resolved Problems:    * No resolved hospital problems. *        IMPRESSION:    1. Evaded troponins likely due to NSTEMI type 2/demand ischemia  2. Anemia. Pending work-up  3. Hypokalemia  4. Elevated WBC white blood cells. 5. S/p Robotic nissen fundoplication and hiatal hernia repair 2/24/20  6. S/p Trach and PEG 3/27/20  7. S/p Cholecystectomy and pyloroplasty s/p 5/24/2    RECOMMENDATIONS:    1. TYPE 2 MI. Doubt ACS. Troponins mildly elevated due to demand ischemia. Troponin pattern not consistent with acute MI. Continue supportive care and treat underlying illnesses. Will obtain echocardiogram.  2. Rest of care per primary       Thank you for allowing us to participate in the care of Kaylan Cunha.  If you have any questions or concerns, please do

## 2020-07-06 NOTE — CONSULTS
 JOINT REPLACEMENT Right     right hip    OVARY REMOVAL      RHINOPLASTY      TONSILLECTOMY      TOTAL HIP ARTHROPLASTY      TOTAL NEPHRECTOMY      atrophic from infections, age 14    TRACHEOSTOMY N/A 3/27/2020    **ADD ON **WANTS 10:00AM **TRACHEOTOMY performed by Desi Bell MD at [facility] 4/2/2020    TRACHEOTOMY EXCHANGE performed by Desi Bell MD at [address] [place] [address] ENDOSCOPY N/A 3/17/2020    BEDSIDE EGD ESOPHAGOGASTRODUODENOSCOPY (ICU) performed by Desi Bell MD at [address] [place] [address] 5/18/2020    EGD BIOPSY performed by Renny Jovel MD at [address] [place] [address]  5/18/2020    EGD DILATION BALLOON performed by Renny Jovel MD at [facility]       Medications:      magnesium sulfate  1 g Intravenous Q1H    potassium chloride  10 mEq Intravenous Q1H    sodium chloride  500 mL Intravenous Once    enoxaparin  40 mg Subcutaneous Daily    sodium chloride flush  10 mL Intravenous 2 times per day       Social History:     Social History     Socioeconomic History    Marital status:       Spouse name: Not on file    Number of children: 2    Years of education: Not on file    Highest education level: Not on file   Occupational History    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   Social Needs    Financial resource strain: Not on file    Food insecurity     Worry: Not on file     Inability: Not on file   [facility] needs     Medical: Not on file     Non-medical: Not on file   Tobacco Use    Smoking status: Current Every Day Smoker     Packs/day: 1.00     Years: 50.00     Pack years: 50.00     Types: Cigarettes    Smokeless tobacco: Never Used   Substance and Sexual Activity    Alcohol use: No    Drug use: No    Sexual activity: Never   Lifestyle    Physical activity     Days per week: Not on file     Minutes per session: Not on file    Stress: Not on file   Relationships    Social connections     Talks on phone: Not on file     Gets together: Not on file     Attends Sabianist service: Not on file     Active member of club or organization: Not on file     Attends meetings of clubs or organizations: Not on file     Relationship status: Not on file    Intimate partner violence     Fear of current or ex partner: Not on file     Emotionally abused: Not on file     Physically abused: Not on file     Forced sexual activity: Not on file   Other Topics Concern    Not on file   Social History Narrative    Not on file       Family History:     Family History   Problem Relation Age of Onset    Cancer Mother         uterine    Heart Attack Mother     Heart Attack Father     Other Maternal Grandfather         foot gangrene    Other Paternal Grandmother         bleeding ulcers    Other Paternal Grandfather         lung cancer        Allergies:   Prednisone; Compazine [prochlorperazine maleate]; Penicillin v potassium; and Penicillins     Review of Systems:     Review of Systems   Constitutional: Positive for fatigue. Negative for activity change, chills and fever. HENT: Negative for congestion. Eyes: Negative for visual disturbance. Respiratory: Positive for shortness of breath. Negative for cough. Cardiovascular: Negative for chest pain. Gastrointestinal: Positive for abdominal pain and vomiting. Endocrine: Negative for polyuria. Genitourinary: Negative for frequency. Musculoskeletal: Positive for myalgias. Negative for arthralgias. Skin: Negative for color change. Allergic/Immunologic: Negative for immunocompromised state. Neurological: Negative for dizziness and facial asymmetry. Hematological: Negative for adenopathy. Psychiatric/Behavioral: Negative for agitation.        Physical Examination :     Patient Vitals for the past 8 hrs:   BP Temp Temp src Pulse Resp SpO2 Weight   07/06/20 0800 (!) 114/56 98.4 °F (36.9 °C) Oral 86 20 96 % -- 07/06/20 0630 120/60 98.5 °F (36.9 °C) Oral 91 17 95 % --   07/06/20 0625 129/62 98.1 °F (36.7 °C) Oral 95 18 98 % --   07/06/20 0559 -- -- -- -- -- -- 191 lb 9.3 oz (86.9 kg)   07/06/20 0555 (!) 109/58 98.5 °F (36.9 °C) Oral 90 24 99 % --   07/06/20 0540 122/65 98.7 °F (37.1 °C) Oral 87 21 96 % --   07/06/20 0535 114/65 98.3 °F (36.8 °C) Oral 89 23 96 % --   07/06/20 0530 (!) 112/58 97.8 °F (36.6 °C) Oral 87 20 98 % --   07/06/20 0525 (!) 115/57 98.1 °F (36.7 °C) Oral 87 29 98 % --   07/06/20 0524 (!) 115/57 98.1 °F (36.7 °C) -- 87 30 99 % --   07/06/20 0512 (!) 117/58 98 °F (36.7 °C) Oral 84 25 98 % --       Physical Exam  Constitutional:       General: She is not in acute distress. Appearance: Normal appearance. She is not ill-appearing. HENT:      Head: Normocephalic and atraumatic. Nose: Nose normal. No congestion. Mouth/Throat:      Mouth: Mucous membranes are moist.   Eyes:      General: No scleral icterus. Conjunctiva/sclera: Conjunctivae normal.      Pupils: Pupils are equal, round, and reactive to light. Neck:      Musculoskeletal: Neck supple. No neck rigidity. Cardiovascular:      Rate and Rhythm: Normal rate and regular rhythm. Heart sounds: Normal heart sounds. Pulmonary:      Effort: No respiratory distress. Breath sounds: No wheezing. Abdominal:      General: There is no distension. Tenderness: There is abdominal tenderness. Genitourinary:     Comments: chu w clear urine  Musculoskeletal:         General: No swelling or deformity. Skin:     Coloration: Skin is not jaundiced. Findings: No bruising. Neurological:      General: No focal deficit present. Mental Status: She is alert and oriented to person, place, and time. Mental status is at baseline. Psychiatric:         Mood and Affect: Mood normal.         Thought Content:  Thought content normal.           Medical Decision Making:   I have independently reviewed/ordered the following labs:    CBC with Differential:   Recent Labs     07/05/20 0428 07/06/20 0428 07/06/20  0917   WBC 25.1*  --   --   --    HGB 7.6*   < > 6.9* 7.9*   HCT 25.6*   < > 22.6* 26.2*     --   --   --    LYMPHOPCT 6*  --   --   --    MONOPCT 5  --   --   --     < > = values in this interval not displayed. BMP:  Recent Labs     07/05/20 0428 07/06/20 0428   * 141   K 3.9 3.6*   CL 96* 105   CO2 25 26   BUN 21 10   CREATININE 0.64 0.53   MG 1.5*  --      Hepatic Function Panel:   Recent Labs     07/05/20 0428   PROT 6.1*   LABALBU 2.9*   BILITOT 0.19*   ALKPHOS 104   ALT 22   AST 22     No results for input(s): RPR in the last 72 hours. No results for input(s): HIV in the last 72 hours. No results for input(s): BC in the last 72 hours. Lab Results   Component Value Date    CREATININE 0.53 07/06/2020    GLUCOSE 93 07/06/2020       Detailed results: Thank you for allowing us to participate in the care of this patient. Please call with questions. This note is created with the assistance of a speech recognition program.  While intending to generate adocument that actually reflects the content of the visit, the document can still have some errors including those of syntax and sound a like substitutions which may escape proof reading. It such instances, actual meaningcan be extrapolated by contextual diversion.     Maren Ramirez MD  Office: [phone]  Perfect serve / office [phone]

## 2020-07-06 NOTE — ANESTHESIA POSTPROCEDURE EVALUATION
Department of Anesthesiology  Postprocedure Note    Patient: Darryl Singh  MRN: [id number]  YOB: 1945  Date of evaluation: 7/6/2020  Time:  7:24 PM     Procedure Summary     Date:  07/06/20 Room / Location:  [address] [place] [address] 1 / [facility]    Anesthesia Start:  1312 Anesthesia Stop:  1778    Procedure:  ** CASE IN O.R. WITH GI STAFF** EGD ESOPHAGOGASTRODUODENOSCOPY (N/A ) Diagnosis:  (COFFEE GROUNDS EMESIS)    Surgeon:  Christelle Srinivasan MD Responsible Provider:  Franky Gutierrez MD    Anesthesia Type:  general ASA Status:  4          Anesthesia Type: general    Alhaji Phase I: Alhaji Score: 9    Alhaji Phase II:      Last vitals: Reviewed and per EMR flowsheets.      POST-OP ANESTHESIA NOTE       BP (!) 129/58   Pulse 91   Temp 98.4 °F (36.9 °C) (Oral)   Resp 20   Ht 5' 3\" (1.6 m)   Wt 191 lb (86.6 kg)   SpO2 97%   BMI 33.83 kg/m²    Pain Assessment: 0-10  Pain Level: 0       Anesthesia Post Evaluation    Patient location during evaluation: PACU  Patient participation: complete - patient participated  Level of consciousness: awake  Pain score: 0  Airway patency: patent  Nausea & Vomiting: no nausea and no vomiting  Complications: no  Cardiovascular status: hemodynamically stable  Respiratory status: acceptable  Hydration status: stable

## 2020-07-06 NOTE — PROGRESS NOTES
[facility] [place] [facility]   Progress Note    Darlene Veras is a 76 y.o. female patient. Hospitalization Day:1      Chief consult reason:   Repeated coffee ground emesis, melena  Abdominal pain, HX of Nissen 2020    Subjective:  Pt not seen due to pt is on Covid isolation unit  Pt's hgb this am was 6.9-for which she is receiving 1 unit of PRBC's. Cardiology was consulted for elevated troponin, saw pt, feel most likely this is due to demand ischemia secondary to acute blood loss    Staff denies any active bleeding overnight. VSS    VITALS:  /60   Pulse 91   Temp 98.5 °F (36.9 °C) (Oral)   Resp 17   Ht 5' 3\" (1.6 m)   Wt 191 lb 9.3 oz (86.9 kg)   SpO2 95%   BMI 33.94 kg/m²   TEMPERATURE:  Current - Temp: 98.5 °F (36.9 °C);  Max - Temp  Av.6 °F (37 °C)  Min: 97.8 °F (36.6 °C)  Max: 100.4 °F (38 °C)    Physical Assessment:  Deferred to primary as pt is on covid unit    Data Review:    Labs and Imaging:     CBC:  Recent Labs     20  1007 20  0020 20   WBC 25.1*  --   --   --   --    HGB 7.6* 6.1* 7.3* 7.0* 6.9*   MCV 80.0*  --   --   --   --    RDW 17.1*  --   --   --   --      --   --   --   --        ANEMIA STUDIES:  Recent Labs     20   LABIRON 8*   TIBC 165*       BMP:  Recent Labs     20   * 141   K 3.9 3.6*   CL 96* 105   CO2 25 26   BUN 21 10   CREATININE 0.64 0.53   GLUCOSE 128* 93   CALCIUM 9.1 8.3*       LFTS:  Recent Labs     20   ALKPHOS 104   ALT 22   AST 22   BILITOT 0.19*   LABALBU 2.9*       Amylase/Lipase and Ammonia:  Recent Labs     20   AMYLASE 101*   LIPASE 12*       Acute Hepatitis Panel:  Lab Results   Component Value Date    HEPBSAG NONREACTIVE 2020    HEPCAB NONREACTIVE 2020    HEPBIGM NONREACTIVE 2020    HEPAIGM NONREACTIVE 2020       HCV Genotype:  No results found for: HEPATITISCGENOTYPE    HCV Quantitative:  No results found for: HCVQNT    LIVER WORK UP:    AFP  No results found for: AFP    Alpha 1 antitrypsin   No results found for: A1A    Anti - Liver/Kidney Ab  No results found for: LIVER-KIDNEYMICROSOMALAB    TOÑO  Lab Results   Component Value Date    TOÑO NEGATIVE 05/16/2020       AMA  Lab Results   Component Value Date    MITOAB 9.7 05/16/2020       ASMA  Lab Results   Component Value Date    SMOOTHMUSCAB 15 05/16/2020       Ceruloplasmin  No results found for: CERULOPLSM    Celiac panel  No results found for: TISSTRNTIIGG, TTGIGA, IGA    PT/INR  Recent Labs     07/05/20  0633   PROTIME 12.1*   INR 1.2       Cancer Markers:  CEA:  No results for input(s): CEA in the last 72 hours. Ca 125:  No results for input(s):  in the last 72 hours. Ca 19-9:   Invalid input(s):   AFP: No results for input(s): AFP in the last 72 hours. Lactic acid:Invalid input(s): LACTIC ACID    Radiology Review:    No results found. Principal Problem:    Acute upper GI bleed  Active Problems:    MVP (mitral valve prolapse)    Paraesophageal hernia    Centrilobular emphysema (HCC)    H/O gastric ulcer    Chronic respiratory failure with hypoxia (HCC)    S/P Nissen fundoplication (without gastrostomy tube) procedure    Acute blood loss anemia  Resolved Problems:    * No resolved hospital problems. *       GI Assessment and plan:  1. Coffee ground emesis-resolved. -Will plan for endoscopy once cardiology has finished their work up and gives permission for endoscopy. We prefer Hgb to be above 7 for procedure  -Clear diet ok today if cleared with other services  -Cont PPI drip  -Call with active bleeding  -Supportive care with antiemetics    2. Mixed Anemia-Hgb 6.1, MCV 80.0, Iron Sat 17%  -Transfuse to maintain Hgb greater than 7  -Rec iron supplementation  -Rec MV  -Monitor closely for active bleeding  -Rec CBC, BMP daily.  Hgb q 8 once level is above 7    Pt is COVID negative awaiting bed on non-COVID unit  Please call with active bleeding or if pt becomes unstable  Thank you for allowing me to participate in the care of your patient. Please feel free to contact me with any questions or concerns. Kimberley Parry, [address] [facility]  [phone]    Attestation:  Case discussed with Ms. Melody Díaz. No PE performed as patient currently on COVID unit and want to conserve PPE. After discussion with anesthesia - given cardiology's concern about possible NSTEMI (which may be demand ischemia) since the patient is hemodynamically stable, the concern is to complete the cardiac work up and obtain cardiac clearance along with transfusion to get Hgb higher before proceeding to EGD. Please keep NPO after MN and will tentatively plan EGD for tomorrow pending results of transfusion and cardiac work up.

## 2020-07-06 NOTE — CONSULTS
Date:   7/6/2020  Patient name: Ponce Reynoso  Date of admission:  7/5/2020  3:57 AM  MRN:   [id number]  YOB: 1945  PCP: Levon Mccoy MD    Reason for Admission: Acute blood loss anemia [D62]  Acute blood loss anemia [D62]    Cardiology consult: Preprocedure cardiac assessment, abnormal high-sensitivity troponin 71       Impression  Patient admitted with a GI bleeding      History of Nissen fundoplication and hiatal hernia repair 2/24/2020  Postop with respiratory failure required tracheostomy and PEG tube placement  Robotic cholecystectomy and pyloroplasty in May 2020  5/18/2020 EGD showed severe esophagitis, severe gastritis  5/21/2020 patient had ERCP completed with cholangiogram, sphincterectomy and stent placed in the CBD due to stricture  History of hypertension  History of hyperlipidemia   Recurrent UTI  No previous history of coronary intervention  History of total hip arthroplasty right hip        COVID-19 negative    ECG 7/6/2020 normal sinus rhythm heart rate 90  2D echo 2/26/2020: Normal LV size, normal wall thickness, ejection fraction 68%, mild MR, RVSP 45 no significant pericardial effusion    Lab work 7/6/2020 sodium 141 potassium 3.6 BUN 10 creatinine 0.53 GFR more than 60 calcium 8.3  Hemoglobin on 7/5/2020 6.1, current hemoglobin today is 7.9, platelets 660, MCV 80    EGD 7/6/2020 showed a severe inflammation with ulceration of the esophagus. There was free reflux of gastric bile seen to occur into the esophagus but no blood or bleeding seen. Patient history of present illness  28-year-old female got admitted with a complaint of cough and vomiting that turned into hematemesis  Upon arrival to ER she had a moderate episode of coffee-ground hematemesis and she was started on IV fluids and PPI drip. On admission her temperature was 99.2, blood pressure 119/66 and heart rate 102 bpm.  Her respiratory rate was 27 and oxygen saturation 95%.   Abdominal examination revealed diffuse obese bowel sounds present  Extremities: Homans sign is negative, no sign of DVT  Neurologic: Mental status: Alert, oriented, thought content appropriate    EKG: normal sinus rhythm,   ECHO: reviewed.    Ejection fraction: 60% RVSP 45    Assessment / Acute Cardiac Problems:     Patient admitted with GI bleeding, EGD showed severe esophagitis    History of robotic Nissen fundoplication and hernia repair  Postop respiratory failure required tracheostomy and PEG tube placement  Postop severe esophagitis  Status post robotic cholecystectomy and pyloroplasty  Hypertension  Hyperlipidemia  No history of coronary intervention    Patient had EGD today without any complication and EGD showed severe esophagitis    Patient Active Problem List:     Frequency of urination     Back pain     GERD (gastroesophageal reflux disease)     MVP (mitral valve prolapse)     Depression     Anxiety     Urine retention     Dysphagia     Paraesophageal hernia     History of repair of hiatal hernia     Centrilobular emphysema (HCC)     Esophageal dilatation     Acute on chronic blood loss anemia     Shock (HCC)     Fever     Critical illness polyneuropathy (HCC)     Gastric outlet obstruction     Tracheostomy in place [facility])     H/O gastric ulcer     Hyperlipidemia     Hypertension     Tobacco abuse     Chronic respiratory failure with hypoxia (HCC)     Status post insertion of percutaneous endoscopic gastrostomy (PEG) tube (HCC)     S/P Nissen fundoplication (without gastrostomy tube) procedure     Acute upper GI bleed     Acute blood loss anemia      Plan of Treatment:   Medication checked  Agree with current medication and management     Patient does not need any further cardiac work-up      Electronically signed by Kaylee Alex MD on 7/6/2020 at 2:06 PM

## 2020-07-06 NOTE — ANESTHESIA PRE PROCEDURE
Department of Anesthesiology  Preprocedure Note       Name:  Sonal Olivarez   Age:  76 y.o.  :  1945                                          MRN:  [id number]         Date:  2020      Surgeon: Aurora Harrell):  Sofi Tucker MD    Procedure:  CASE IN OR WITH GI STAFF**\* EGD ESOPHAGOGASTRODUODENOSCOPY (N/A )    Department of Anesthesiology  Pre-Anesthesia Evaluation/Consultation         Name:  Sonal Olivarez                                         Age:  76 y.o. MRN:  [id number]             Medications  No current facility-administered medications for this visit. No current outpatient medications on file.      Facility-Administered Medications Ordered in Other Visits   Medication Dose Route Frequency Provider Last Rate Last Dose    magnesium sulfate 1 g in dextrose 5% 100 mL IVPB  1 g Intravenous Q1H Carli Shore  mL/hr at 20 1159 1 g at 20 1159    potassium chloride 10 mEq/100 mL IVPB (Peripheral Line)  10 mEq Intravenous Q1H Carli Shore  mL/hr at 20 1203 10 mEq at 20 1203    linezolid (ZYVOX) IVPB 600 mg  600 mg Intravenous Q12H Philly Gallegos MD        pantoprazole (PROTONIX) 80 mg in sodium chloride 0.9 % 100 mL infusion  8 mg/hr Intravenous Continuous Cristian River DO 10 mL/hr at 20 0237 8 mg/hr at 20 0237    0.9 % sodium chloride bolus  500 mL Intravenous Once Raquel Hall MD        0.9 % sodium chloride infusion   Intravenous Continuous Kvng Collier  mL/hr at 20 1330      0.9 % sodium chloride infusion   Intravenous Continuous Kvng Collier  mL/hr at 20 0845      acetaminophen (TYLENOL) tablet 650 mg  650 mg Oral Q6H PRN Kvng Collier MD        Or    acetaminophen (TYLENOL) suppository 650 mg  650 mg Rectal Q6H PRN Kvng Collier MD   650 mg at 20 0217    ondansetron (ZOFRAN) injection 4 mg  4 mg Intravenous Q6H PRN Kvng Collier MD        enoxaparin (LOVENOX) injection 40 mg 40 mg Subcutaneous Daily Kvng Joseph MD        sodium chloride flush 0.9 % injection 10 mL  10 mL Intravenous 2 times per day Claudia Tee MD   10 mL at 07/06/20 0847    sodium chloride flush 0.9 % injection 10 mL  10 mL Intravenous PRN Claudia Tee MD        LORazepam (ATIVAN) injection 0.5 mg  0.5 mg Intravenous Q6H PRN MARGOTH Cannon - NP   0.5 mg at 07/06/20 3990       Allergies   Allergen Reactions    Prednisone Anxiety    Compazine [Prochlorperazine Maleate]     Penicillin V Potassium     Penicillins Other (See Comments)     shock     Patient Active Problem List   Diagnosis    Frequency of urination    Back pain    GERD (gastroesophageal reflux disease)    MVP (mitral valve prolapse)    Depression    Anxiety    Urine retention    Dysphagia    Paraesophageal hernia    History of repair of hiatal hernia    Centrilobular emphysema ([address]    Esophageal dilatation    Acute on chronic blood loss anemia    Shock ([address]    Fever    Critical illness polyneuropathy ([address]    Gastric outlet obstruction    Tracheostomy in place ([address]    H/O gastric ulcer    Hyperlipidemia    Hypertension    Tobacco abuse    Chronic respiratory failure with hypoxia ([address]    Status post insertion of [facility] tube [facility]    S/P Nissen fundoplication (without gastrostomy tube) procedure    Acute upper GI bleed    Acute blood loss anemia     Past Medical History:   Diagnosis Date    Anxiety     Arthritis     Back pain     Bronchitis     Caffeine use     8 coffee / day    Carpal tunnel syndrome     Cataracts, bilateral     Constipation     Depression     Diarrhea     GERD (gastroesophageal reflux disease)     H/O gastric ulcer     Hyperlipidemia     Hypertension     Mumps     MVP (mitral valve prolapse)     Dr. Samantha Dempsey in May 2019    Recurrent UTI     Dr. Teresa Preciado    Sinusitis     [facility])     Ulcerative esophagitis     Wellness examination     Dr. Erasmo Murillo seen in Jan 2020     Past Surgical History:   Procedure Laterality Date    APPENDECTOMY      CARPAL TUNNEL RELEASE      CHOLECYSTECTOMY, LAPAROSCOPIC  05/22/2020     LAPAROSCOPIC ROBOTIC CHOLECYSTECTOMY, PYLOROPLASTY     CHOLECYSTECTOMY, LAPAROSCOPIC N/A 5/22/2020    XI LAPAROSCOPIC ROBOTIC CHOLECYSTECTOMY, PYLOROPLASTY performed by Catalina Macias MD at [address]      EGD  3/17/2020         ERCP  05/21/2020    with stent insertion, balloon dilation, sphinctereotomy    ERCP  5/21/2020    ** CASE IN OR WITY GI STAFF** ERCP STENT INSERTION performed by Damien Rivers MD at [facility]    ERCP  5/21/2020    ** CASE IN OR WITH GI STAFF**ERCP SPHINCTER/PAPILLOTOMY performed by Damien Rivers MD at [facility]    ERCP  5/21/2020    ** CASE IN OR WITH GI STAFF**ERCP DILATION BALLOON performed by Damien Rivers MD at [address]    GASTRIC FUNDOPLICATION N/A 5/67/2788    XI LAPAROSCOPIC ROBOTIC HIATAL HERNIA REPAIR, CHERYL FUNDOPLICATION performed by Catalina Macias MD at [address] [facility] N/A 3/27/2020    EGD PEG TUBE PLACEMENT performed by Catalina Macias MD at [address] [facility] [place]. CATH POWER PICC TRIPLE  3/4/2020         HYSTERECTOMY      Abdominal    JOINT REPLACEMENT Right     right hip    OVARY REMOVAL      RHINOPLASTY      TONSILLECTOMY      TOTAL HIP ARTHROPLASTY      TOTAL NEPHRECTOMY      atrophic from infections, age 13   [place] [facility] TRACHEOSTOMY N/A 3/27/2020    **ADD ON **WANTS 10:00AM **TRACHEOTOMY performed by Catalina Macias MD at [address] [facility] N/A 4/2/2020    TRACHEOTOMY EXCHANGE performed by Catalina Macias MD at [address] [facility] 3/17/2020    BEDSIDE EGD ESOPHAGOGASTRODUODENOSCOPY (ICU) performed by Catalina Macias MD at [address] [facility] 5/18/2020    EGD BIOPSY performed by Renny Jovel MD at [facility]    UPPER GASTROINTESTINAL ENDOSCOPY  2020    EGD DILATION BALLOON performed by Renny Jovel MD at [facility]     Social History     Tobacco Use    Smoking status: Current Every Day Smoker     Packs/day: 1.00     Years: 50.00     Pack years: 50.00     Types: Cigarettes    Smokeless tobacco: Never Used   Substance Use Topics    Alcohol use: No    Drug use: No         Vital Signs (Current)   There were no vitals filed for this visit. Vital Signs Statistics (for past 48 hrs)     Temp  Av.5 °F (36.9 °C)  Min: 97 °F (36.1 °C)   Min taken time: 20 1246  Max: 100.4 °F (38 °C)   Max taken time: 20 1814  Pulse  Av.4  Min: 80   Min taken time: 20 0512  Max: 108   Max taken time: 20 0404  Resp  Av.4  Min: 12   Min taken time: 20 1057  Max: 30   Max taken time: 20 0524  BP  Min: 101/54   Min taken time: 20 07  Max: 141/65   Max taken time: 20 1814  MAP (mmHg)  Av.1  Min: 72   Min taken time: 20  Max: 104   Max taken time: 20 0446  SpO2  Av.1 %  Min: 90 %   Min taken time: 20 0404  Max: 99 %   Max taken time: 20 1246  BP Readings from Last 3 Encounters:   20 126/72   20 (!) 133/57   20 89/62       BMI  There is no height or weight on file to calculate BMI.     CBC   Lab Results   Component Value Date    WBC 25.1 2020    RBC 3.20 2020    HGB 7.9 2020    HCT 26.2 2020    MCV 80.0 2020    RDW 17.1 2020     2020       CMP    Lab Results   Component Value Date     2020    K 3.6 2020     2020    CO2 26 2020    BUN 10 2020    CREATININE 0.53 2020    GFRAA >60 2020    LABGLOM >60 2020    GLUCOSE 93 2020    PROT 6.1 2020    CALCIUM 8.3 2020    BILITOT 0.19 2020    ALKPHOS 104 2020    AST 22 2020    ALT 22 07/05/2020       BMP    Lab Results   Component Value Date     07/06/2020    K 3.6 07/06/2020     07/06/2020    CO2 26 07/06/2020    BUN 10 07/06/2020    CREATININE 0.53 07/06/2020    CALCIUM 8.3 07/06/2020    GFRAA >60 07/06/2020    LABGLOM >60 07/06/2020    GLUCOSE 93 07/06/2020       POC Testing  Recent Labs     07/05/20  0410   POCGLU 126*       Coags    Lab Results   Component Value Date    PROTIME 12.1 07/05/2020    INR 1.2 07/05/2020    APTT 26.2 03/27/2020       HCG (If Applicable) No results found for: PREGTESTUR, PREGSERUM, HCG, HCGQUANT     ABGs No results found for: PHART, PO2ART, AUA3BQS, CGX4DIB, BEART, E0PTKRII     Type & Screen (If Applicable)  No results found for: LABABO, [address]    Radiology (If Applicable)    Cardiac Testing (If Applicable)     EKG (If Applicable) NSST changes          Medications prior to admission:   Prior to Admission medications    Medication Sig Start Date End Date Taking?  Authorizing Provider   midodrine (PROAMATINE) 5 MG tablet Take 1 tablet by mouth 3 times daily as needed (if SBP < 100) 6/1/20   Ryan Felder MD   metoprolol succinate (TOPROL XL) 25 MG extended release tablet Take 1 tablet by mouth daily 5/30/20   Ryan Felder MD   busPIRone (BUSPAR) 10 MG tablet Take 2 tablets by mouth 3 times daily 4/6/20   Yamile Garcia MD   escitalopram (LEXAPRO) 20 MG tablet Take 1 tablet by mouth daily 4/6/20   Yamile Garcia MD   QUEtiapine (SEROQUEL) 100 MG tablet Take 1 tablet by mouth nightly 4/6/20   Yamile Garcia MD   furosemide (LASIX) 20 MG tablet Take 1 tablet by mouth daily 4/6/20   Yamile Garcia MD   docusate (COLACE) 50 MG/5ML liquid 10 mLs by Per G Tube route 2 times daily 4/6/20   Yamile Garcia MD   metoclopramide (REGLAN) 5 MG/ML injection Infuse 2 mLs intravenously every 6 hours 4/6/20   Yamile Garcia MD   sucralfate (CARAFATE) 1 GM tablet Take 1 tablet by mouth 4 times daily 4/6/20   Yovana Jewell Mallory Kendrick MD   traZODone (DESYREL) 100 MG tablet Take 1 tablet by mouth nightly 4/6/20   Dario Ku MD   pantoprazole (PROTONIX) 40 MG injection Infuse 40 mg intravenously 2 times daily 4/6/20   Dario Ku MD RA VITAMIN D-3 50 MCG (2000 UT) CAPS take 1 capsule by mouth once daily 2/14/20   Historical Provider, MD   amitriptyline (ELAVIL) 25 MG tablet Take 25 mg by mouth three times daily     Historical Provider, MD   Oyster Shell 500 MG TABS Take 500 mg by mouth 2 times daily     Historical Provider, MD   clonazePAM (KLONOPIN) 0.5 MG tablet Take 0.5 mg by mouth 2 times daily. Taking 1/2 tablet BID    Historical Provider, MD   Acetaminophen 500 MG CAPS Take 1 tablet by mouth as needed for Pain (follow OTC instructions)    Historical Provider, MD   calcium carbonate (TUMS) 500 MG chewable tablet Take 1 tablet by mouth 3 times daily as needed for Heartburn    Historical Provider, MD   gabapentin (NEURONTIN) 300 MG capsule Take 300 mg by mouth 3 times daily. Historical Provider, MD   simvastatin (ZOCOR) 40 MG tablet Take 40 mg by mouth nightly. Historical Provider, MD       Current medications:    No current facility-administered medications for this visit. No current outpatient medications on file.      Facility-Administered Medications Ordered in Other Visits   Medication Dose Route Frequency Provider Last Rate Last Dose    magnesium sulfate 1 g in dextrose 5% 100 mL IVPB  1 g Intravenous Q1H Gala Dancer,  mL/hr at 07/06/20 1159 1 g at 07/06/20 1159    potassium chloride 10 mEq/100 mL IVPB (Peripheral Line)  10 mEq Intravenous Q1H Gala Dancer,  mL/hr at 07/06/20 1203 10 mEq at 07/06/20 1203    linezolid (ZYVOX) IVPB 600 mg  600 mg Intravenous Q12H Philly Nicole MD        pantoprazole (PROTONIX) 80 mg in sodium chloride 0.9 % 100 mL infusion  8 mg/hr Intravenous Continuous Tianna Rainey, DO 10 mL/hr at 07/06/20 0237 8 mg/hr at 07/06/20 0237    0.9 % sodium chloride bolus  500 mL Intravenous Once Jada Bianchi MD        0.9 % sodium chloride infusion   Intravenous Continuous Kvng Alcala  mL/hr at 07/05/20 1330      0.9 % sodium chloride infusion   Intravenous Continuous Kvng Alcala  mL/hr at 07/06/20 0845      acetaminophen (TYLENOL) tablet 650 mg  650 mg Oral Q6H PRN Kvng Alcala MD        Or    acetaminophen (TYLENOL) suppository 650 mg  650 mg Rectal Q6H PRN Kvng Alcala MD   650 mg at 07/06/20 0217    ondansetron (ZOFRAN) injection 4 mg  4 mg Intravenous Q6H PRN Kvng Alcala MD        enoxaparin (LOVENOX) injection 40 mg  40 mg Subcutaneous Daily Kvng Alcala MD        sodium chloride flush 0.9 % injection 10 mL  10 mL Intravenous 2 times per day Guerry Hodgkins, MD   10 mL at 07/06/20 0847    sodium chloride flush 0.9 % injection 10 mL  10 mL Intravenous PRN Guerry Hodgkins, MD        LORazepam (ATIVAN) injection 0.5 mg  0.5 mg Intravenous Q6H PRN Conda Fruits, APRN - NP   0.5 mg at 07/06/20 0847       Allergies:     Allergies   Allergen Reactions    Prednisone Anxiety    Compazine [Prochlorperazine Maleate]     Penicillin V Potassium     Penicillins Other (See Comments)     shock       Problem List:    Patient Active Problem List   Diagnosis Code    Frequency of urination R35.0    Back pain M54.9    GERD (gastroesophageal reflux disease) K21.9    MVP (mitral valve prolapse) I34.1    Depression F32.9    Anxiety F41.9    Urine retention R33.9    Dysphagia R13.10    Paraesophageal hernia K44.9    History of repair of hiatal hernia Z98.890, Z87.19    Centrilobular emphysema ([address] J43.2    Esophageal dilatation K22.8    Acute on chronic blood loss anemia D62    Shock ([address] R57.9    Fever R50.9    Critical illness polyneuropathy ([address] G62.81    Gastric outlet obstruction K31.1    Tracheostomy in place ([address] Z93.0    H/O gastric ulcer Z87.19    Hyperlipidemia E78.5    Hypertension I10  Tobacco abuse Z72.0    Chronic respiratory failure with hypoxia ([facility] J96.11    Status post insertion of percutaneous endoscopic gastrostomy (PEG) tube ([facility]) Z93.1    S/P Nissen fundoplication (without gastrostomy tube) procedure Z98.890    Acute upper GI bleed K92.2    Acute blood loss anemia D62       Past Medical History:        Diagnosis Date    Anxiety     Arthritis     Back pain     Bronchitis     Caffeine use     8 coffee / day    Carpal tunnel syndrome     Cataracts, bilateral     Constipation     Depression     Diarrhea     GERD (gastroesophageal reflux disease)     H/O gastric ulcer     Hyperlipidemia     Hypertension     Mumps     MVP (mitral valve prolapse)     Dr. Roman Stern in May 2019    Recurrent UTI     Dr. Wilkinson Plate    Sinusitis     Tracheostomy in place [facility])     Ulcerative esophagitis     Wellness examination     Dr. Thad Moore seen in Jan 2020       Past Surgical History:        Procedure Laterality Date    APPENDECTOMY      CARPAL TUNNEL RELEASE      CHOLECYSTECTOMY, LAPAROSCOPIC  05/22/2020     LAPAROSCOPIC ROBOTIC CHOLECYSTECTOMY, PYLOROPLASTY     CHOLECYSTECTOMY, LAPAROSCOPIC N/A 5/22/2020    XI LAPAROSCOPIC ROBOTIC CHOLECYSTECTOMY, PYLOROPLASTY performed by Jackie Spencer MD at [address]      EGD  3/17/2020         ERCP  05/21/2020    with stent insertion, balloon dilation, sphinctereotomy    ERCP  5/21/2020    ** CASE IN OR WITY GI STAFF** ERCP STENT INSERTION performed by Vishal Perez MD at [facility]    ERCP  5/21/2020    ** CASE IN OR WITH GI STAFF**ERCP SPHINCTER/PAPILLOTOMY performed [facility]al [facility] MD at [facility]    ERCP  5/21/2020    ** CASE IN OR WITH GI STAFF**ERCP DILATION BALLOON performed by Vishal Perez MD at [address]    GASTRIC FUNDOPLICATION N/A 0/43/4741    XI LAPAROSCOPIC ROBOTIC HIATAL HERNIA REPAIR, CHERYL FUNDOPLICATION performed by Jackie Spencer MD at [facility] OR    GASTROSTOMY TUBE PLACEMENT N/A 3/27/2020    EGD PEG TUBE PLACEMENT performed by Jean Kinsey MD at [address] CATH POWER PICC TRIPLE  3/4/2020         HYSTERECTOMY      Abdominal    JOINT REPLACEMENT Right     right hip    OVARY REMOVAL      RHINOPLASTY      TONSILLECTOMY      TOTAL HIP ARTHROPLASTY      TOTAL NEPHRECTOMY      atrophic from infections, age 13   Dorothy Juventino TRACHEOSTOMY N/A 3/27/2020    **ADD ON **WANTS 10:00AM **TRACHEOTOMY performed by Jean Kinsey MD at [address] N/A 4/2/2020    TRACHEOTOMY EXCHANGE performed by Jean Kinsey MD at [address] ENDOSCOPY N/A 3/17/2020    BEDSIDE EGD ESOPHAGOGASTRODUODENOSCOPY (ICU) performed by Jean Kinsey MD at [facility]    UPPER GASTROINTESTINAL ENDOSCOPY N/A 5/18/2020    EGD BIOPSY performed by Hill Mcmillan MD at [address] [facility]  5/18/2020    EGD DILATION BALLOON performed by Hill Mcmillan MD at [facility]       Social History:    Social History     Tobacco Use    Smoking status: Current Every Day Smoker     Packs/day: 1.00     Years: 50.00     Pack years: 50.00     Types: Cigarettes    Smokeless tobacco: Never Used   Substance Use Topics    Alcohol use: No                                Ready to quit: Not Answered  Counseling given: Not Answered      Vital Signs (Current): There were no vitals filed for this visit.                                            BP Readings from Last 3 Encounters:   07/06/20 126/72   06/02/20 (!) 133/57   05/22/20 89/62       NPO Status:  MN     Vomited blood yesterday                                                                          BMI:   Wt Readings from Last 3 Encounters:   07/06/20 191 lb (86.6 kg)   05/31/20 186 lb 1.1 oz (84.4 kg)   04/06/20 179 lb 0.2 oz (81.2 kg)     There is no height or weight on file to calculate BMI.    CBC:   Lab Results   Component Value Date    WBC 25.1 07/05/2020    RBC 3.20 07/05/2020    HGB 7.9 07/06/2020    HCT 26.2 07/06/2020    MCV 80.0 07/05/2020    RDW 17.1 07/05/2020     07/05/2020       CMP:   Lab Results   Component Value Date     07/06/2020    K 3.6 07/06/2020     07/06/2020    CO2 26 07/06/2020    BUN 10 07/06/2020    CREATININE 0.53 07/06/2020    GFRAA >60 07/06/2020    LABGLOM >60 07/06/2020    GLUCOSE 93 07/06/2020    PROT 6.1 07/05/2020    CALCIUM 8.3 07/06/2020    BILITOT 0.19 07/05/2020    ALKPHOS 104 07/05/2020    AST 22 07/05/2020    ALT 22 07/05/2020       POC Tests:   Recent Labs     07/05/20  0410   POCGLU 126*       Coags:   Lab Results   Component Value Date    PROTIME 12.1 07/05/2020    INR 1.2 07/05/2020    APTT 26.2 03/27/2020       HCG (If Applicable): No results found for: PREGTESTUR, PREGSERUM, HCG, HCGQUANT     ABGs: No results found for: PHART, PO2ART, BWS1WDO, GUG5FQW, BEART, R7OOBBWR     Type & Screen (If Applicable):  No results found for: LABABO, [address]    Anesthesia Evaluation  Patient summary reviewed no history of anesthetic complications:   Airway: Mallampati: II  TM distance: >3 FB   Neck ROM: full  Comment: Trach in place  Mouth opening: > = 3 FB Dental:    (+) partials  Comment: Very poor dentition     Pulmonary:Negative Pulmonary ROS and normal exam    (+) pneumonia: no interval change,  COPD: moderate and severe,  current smoker                          ROS comment: ! PPD   Cardiovascular:    (+) hypertension: no interval change,       ECG reviewed  Rhythm: regular  Rate: normal           Beta Blocker:  Not on Beta Blocker      ROS comment: -cp,syn,pnd     Neuro/Psych:   (+) neuromuscular disease:, psychiatric history:depression/anxiety              ROS comment: neuropathy GI/Hepatic/Renal:   (+) hiatal hernia, GERD: no interval change, PUD,          ROS comment: persistant vomiting,GI bleed.    Endo/Other: Negative Endo/Other ROS   (+) blood dyscrasia: anemia:., . Abdominal:           Vascular:                                          Anesthesia Plan      general     ASA 4     (Asa 4)  Induction: intravenous. MIPS: Postoperative opioids intended. Anesthetic plan and risks discussed with patient. Plan discussed with CRNA.                   Sampson Sanders MD   7/6/2020

## 2020-07-06 NOTE — PLAN OF CARE
GI update:    Planning for EGD today but Hgb is 6.9-she will need a unit of blood-ordered. Please infuse stat, obtain redraw.     Kendall Cruz, CNP

## 2020-07-07 VITALS
WEIGHT: 190.26 LBS | DIASTOLIC BLOOD PRESSURE: 69 MMHG | HEIGHT: 63 IN | OXYGEN SATURATION: 92 % | BODY MASS INDEX: 33.71 KG/M2 | SYSTOLIC BLOOD PRESSURE: 125 MMHG | RESPIRATION RATE: 30 BRPM | TEMPERATURE: 98.2 F | HEART RATE: 68 BPM

## 2020-07-07 LAB
ABO/RH: NORMAL
ANTIBODY SCREEN: NEGATIVE
ARM BAND NUMBER: NORMAL
BLD PROD TYP BPU: NORMAL
BLD PROD TYP BPU: NORMAL
CROSSMATCH RESULT: NORMAL
CROSSMATCH RESULT: NORMAL
DISPENSE STATUS BLOOD BANK: NORMAL
DISPENSE STATUS BLOOD BANK: NORMAL
EKG ATRIAL RATE: 90 BPM
EKG P AXIS: 51 DEGREES
EKG P-R INTERVAL: 142 MS
EKG Q-T INTERVAL: 346 MS
EKG QRS DURATION: 80 MS
EKG QTC CALCULATION (BAZETT): 423 MS
EKG R AXIS: 53 DEGREES
EKG T AXIS: 34 DEGREES
EKG VENTRICULAR RATE: 90 BPM
EXPIRATION DATE: NORMAL
HCT VFR BLD CALC: 26.5 % (ref 36.3–47.1)
HEMOGLOBIN: 7.6 G/DL (ref 11.9–15.1)
PROCALCITONIN: 0.41 NG/ML
TRANSFUSION STATUS: NORMAL
TRANSFUSION STATUS: NORMAL
UNIT DIVISION: 0
UNIT DIVISION: 0
UNIT NUMBER: NORMAL
UNIT NUMBER: NORMAL

## 2020-07-07 PROCEDURE — 6360000002 HC RX W HCPCS: Performed by: NURSE PRACTITIONER

## 2020-07-07 PROCEDURE — 84145 PROCALCITONIN (PCT): CPT

## 2020-07-07 PROCEDURE — 6360000002 HC RX W HCPCS: Performed by: INTERNAL MEDICINE

## 2020-07-07 PROCEDURE — 2700000000 HC OXYGEN THERAPY PER DAY

## 2020-07-07 PROCEDURE — 2580000003 HC RX 258: Performed by: NURSE PRACTITIONER

## 2020-07-07 PROCEDURE — 93010 ELECTROCARDIOGRAM REPORT: CPT | Performed by: INTERNAL MEDICINE

## 2020-07-07 PROCEDURE — 6370000000 HC RX 637 (ALT 250 FOR IP): Performed by: NURSE PRACTITIONER

## 2020-07-07 PROCEDURE — 6360000002 HC RX W HCPCS: Performed by: FAMILY MEDICINE

## 2020-07-07 PROCEDURE — 85018 HEMOGLOBIN: CPT

## 2020-07-07 PROCEDURE — 94761 N-INVAS EAR/PLS OXIMETRY MLT: CPT

## 2020-07-07 PROCEDURE — C9113 INJ PANTOPRAZOLE SODIUM, VIA: HCPCS | Performed by: NURSE PRACTITIONER

## 2020-07-07 PROCEDURE — 99239 HOSP IP/OBS DSCHRG MGMT >30: CPT | Performed by: INTERNAL MEDICINE

## 2020-07-07 PROCEDURE — 99232 SBSQ HOSP IP/OBS MODERATE 35: CPT | Performed by: INTERNAL MEDICINE

## 2020-07-07 PROCEDURE — 2580000003 HC RX 258: Performed by: INTERNAL MEDICINE

## 2020-07-07 PROCEDURE — 6370000000 HC RX 637 (ALT 250 FOR IP): Performed by: FAMILY MEDICINE

## 2020-07-07 PROCEDURE — 85014 HEMATOCRIT: CPT

## 2020-07-07 PROCEDURE — 36415 COLL VENOUS BLD VENIPUNCTURE: CPT

## 2020-07-07 RX ORDER — CLONAZEPAM 0.5 MG/1
0.25 TABLET ORAL 2 TIMES DAILY
Qty: 5 TABLET | Refills: 0 | Status: ON HOLD | OUTPATIENT
Start: 2020-07-07 | End: 2021-11-25 | Stop reason: HOSPADM

## 2020-07-07 RX ORDER — PANTOPRAZOLE SODIUM 40 MG/1
40 TABLET, DELAYED RELEASE ORAL 2 TIMES DAILY
Status: ON HOLD | DISCHARGE
Start: 2020-07-07 | End: 2021-11-25 | Stop reason: SDUPTHER

## 2020-07-07 RX ORDER — FERROUS SULFATE 325(65) MG
325 TABLET ORAL 2 TIMES DAILY
Status: ON HOLD | DISCHARGE
Start: 2020-07-07 | End: 2020-12-16 | Stop reason: HOSPADM

## 2020-07-07 RX ORDER — ESCITALOPRAM OXALATE 20 MG/1
20 TABLET ORAL DAILY
Status: ON HOLD | DISCHARGE
Start: 2020-07-14 | End: 2020-12-16 | Stop reason: HOSPADM

## 2020-07-07 RX ORDER — LINEZOLID 600 MG/1
600 TABLET, FILM COATED ORAL 2 TIMES DAILY
DISCHARGE
Start: 2020-07-07 | End: 2020-07-13

## 2020-07-07 RX ORDER — AMITRIPTYLINE HYDROCHLORIDE 25 MG/1
25 TABLET, FILM COATED ORAL NIGHTLY
DISCHARGE
Start: 2020-07-14 | End: 2022-09-13

## 2020-07-07 RX ORDER — BUSPIRONE HYDROCHLORIDE 10 MG/1
20 TABLET ORAL 3 TIMES DAILY
Status: ON HOLD | DISCHARGE
Start: 2020-07-14 | End: 2021-11-30 | Stop reason: HOSPADM

## 2020-07-07 RX ADMIN — ACETAMINOPHEN 650 MG: 325 TABLET ORAL at 01:01

## 2020-07-07 RX ADMIN — CLONAZEPAM 0.5 MG: 0.5 TABLET ORAL at 09:04

## 2020-07-07 RX ADMIN — AMITRIPTYLINE HYDROCHLORIDE 25 MG: 25 TABLET, FILM COATED ORAL at 13:19

## 2020-07-07 RX ADMIN — METOCLOPRAMIDE 10 MG: 5 INJECTION, SOLUTION INTRAMUSCULAR; INTRAVENOUS at 01:01

## 2020-07-07 RX ADMIN — LORAZEPAM 0.5 MG: 2 INJECTION INTRAMUSCULAR; INTRAVENOUS at 12:37

## 2020-07-07 RX ADMIN — ESCITALOPRAM OXALATE 20 MG: 10 TABLET ORAL at 09:00

## 2020-07-07 RX ADMIN — PANTOPRAZOLE SODIUM 40 MG: 40 INJECTION, POWDER, FOR SOLUTION INTRAVENOUS at 08:59

## 2020-07-07 RX ADMIN — Medication 10 ML: at 08:59

## 2020-07-07 RX ADMIN — LINEZOLID 600 MG: 600 INJECTION, SOLUTION INTRAVENOUS at 01:01

## 2020-07-07 RX ADMIN — SODIUM CHLORIDE 200 MG: 9 INJECTION, SOLUTION INTRAVENOUS at 09:04

## 2020-07-07 RX ADMIN — LINEZOLID 600 MG: 600 INJECTION, SOLUTION INTRAVENOUS at 13:21

## 2020-07-07 RX ADMIN — GABAPENTIN 300 MG: 300 CAPSULE ORAL at 13:19

## 2020-07-07 RX ADMIN — ENOXAPARIN SODIUM 40 MG: 40 INJECTION SUBCUTANEOUS at 08:59

## 2020-07-07 RX ADMIN — METOPROLOL SUCCINATE 25 MG: 25 TABLET, FILM COATED, EXTENDED RELEASE ORAL at 09:00

## 2020-07-07 RX ADMIN — BUSPIRONE HYDROCHLORIDE 20 MG: 10 TABLET ORAL at 13:19

## 2020-07-07 RX ADMIN — METOCLOPRAMIDE 10 MG: 5 INJECTION, SOLUTION INTRAMUSCULAR; INTRAVENOUS at 12:08

## 2020-07-07 RX ADMIN — GABAPENTIN 300 MG: 300 CAPSULE ORAL at 09:00

## 2020-07-07 RX ADMIN — METOCLOPRAMIDE 10 MG: 5 INJECTION, SOLUTION INTRAMUSCULAR; INTRAVENOUS at 05:44

## 2020-07-07 RX ADMIN — AMITRIPTYLINE HYDROCHLORIDE 25 MG: 25 TABLET, FILM COATED ORAL at 09:00

## 2020-07-07 RX ADMIN — FUROSEMIDE 20 MG: 20 TABLET ORAL at 09:00

## 2020-07-07 RX ADMIN — METOCLOPRAMIDE 10 MG: 5 INJECTION, SOLUTION INTRAMUSCULAR; INTRAVENOUS at 18:10

## 2020-07-07 RX ADMIN — BUSPIRONE HYDROCHLORIDE 20 MG: 10 TABLET ORAL at 09:00

## 2020-07-07 ASSESSMENT — PAIN DESCRIPTION - PAIN TYPE
TYPE: ACUTE PAIN
TYPE: CHRONIC PAIN

## 2020-07-07 ASSESSMENT — PAIN DESCRIPTION - PROGRESSION
CLINICAL_PROGRESSION: NOT CHANGED

## 2020-07-07 ASSESSMENT — PAIN DESCRIPTION - ORIENTATION: ORIENTATION: LEFT

## 2020-07-07 ASSESSMENT — PAIN SCALES - GENERAL
PAINLEVEL_OUTOF10: 7
PAINLEVEL_OUTOF10: 4

## 2020-07-07 ASSESSMENT — PAIN DESCRIPTION - FREQUENCY
FREQUENCY: CONTINUOUS
FREQUENCY: CONTINUOUS

## 2020-07-07 ASSESSMENT — PAIN DESCRIPTION - DESCRIPTORS
DESCRIPTORS: SHARP
DESCRIPTORS: ACHING

## 2020-07-07 ASSESSMENT — ENCOUNTER SYMPTOMS
VOMITING: 1
COUGH: 0
SHORTNESS OF BREATH: 0
COLOR CHANGE: 0
ABDOMINAL PAIN: 1

## 2020-07-07 ASSESSMENT — PAIN DESCRIPTION - LOCATION
LOCATION: ABDOMEN;KNEE
LOCATION: KNEE

## 2020-07-07 ASSESSMENT — PAIN DESCRIPTION - ONSET
ONSET: ON-GOING
ONSET: ON-GOING

## 2020-07-07 NOTE — PROGRESS NOTES
Attempted to call report, Message left with  Allen Browne. She relates they will call back for report.

## 2020-07-07 NOTE — PLAN OF CARE
Problem: Skin Integrity:  Goal: Will show no infection signs and symptoms  Description: Will show no infection signs and symptoms  Outcome: Met This Shift     Problem: Falls - Risk of:  Goal: Will remain free from falls  Description: Will remain free from falls  7/7/2020 0310 by Delmi Goldstein RN  Outcome: Met This Shift  Patient's bed remained locked and in lowest position throughout shift. Patient belonings and call light remain within reach. 2/4 side rails are up, and non-skid footwear is on. Problem: Bowel Function - Altered:  Goal: Bowel elimination is within specified parameters  Description: Bowel elimination is within specified parameters  Outcome: Met This Shift     Problem: Nausea/Vomiting:  Goal: Absence of nausea/vomiting  Description: Absence of nausea/vomiting  Outcome: Met This Shift     Problem: Pain:  Description: Pain management should include both nonpharmacologic and pharmacologic interventions. Goal: Pain level will decrease  Description: Pain level will decrease  Outcome: Ongoing   Patient rated pain a 7  on a scale from 0-10. Writer administered PRN pain medications to patient.

## 2020-07-07 NOTE — DISCHARGE SUMMARY
Niurka Gamble 19    Discharge Summary     Patient ID: Naun Plascencia  :  1945   MRN: [id number]     ACCOUNT:  [de-identified]   Patient's PCP: Khanh Esparza MD  Admit Date: 2020   Discharge Date: 2020   Length of Stay: 2  Code Status:  Limited  Admitting Physician: Eusebio Rivers MD  Discharge Physician: Elmira Barroso MD     Active Discharge Diagnoses:     Hospital Problem Lists:  Principal Problem:    Acute upper GI bleed  Active Problems:    MVP (mitral valve prolapse)    Paraesophageal hernia    Centrilobular emphysema ([address]    H/O gastric ulcer    Chronic respiratory failure with hypoxia (HCC)    S/P Nissen fundoplication (without gastrostomy tube) procedure    Acute blood loss anemia    Phlebitis after infusion  Resolved Problems:    * No resolved hospital problems. *        Discharged Condition: stable    Hospital Stay:     Hospital Course: This is 76years old female who was admitted to the hospital because of acute upper GI bleed. Patient presented with nausea abdominal pain of 1 day duration. Patient then started having black stools. She was feeling weak. Patient has a history of GI bleed secondary to GE junction ulcer. Patient also underwent robotic gastric fundoplication on  for achalasia. Patient was admitted. She underwent EGD which showed severe esophagitis and GI recommended twice daily Protonix. Patient's hemoglobin remained stable and GI recommended patient can be discharged with outpatient follow-up. She did need 2 units of blood transfusion during this admission. Patient also had leukocytosis. ID evaluated the patient and recommended the patient to be on Zyvox for aspiration pneumonia and also phlebitis. Patient was recommended to keep her head of bed up. On the date of discharge patient was adamant about going back to nursing home. She did not want to stay. She felt back to her baseline. Discussed with infectious disease and the recommended patient can be discharged on oral Zyvox. Antidepressants to be had while on Zyvox. Patient was stabilized and she was discharged back to SNF in a stable condition. Physical examination    Respiratory exam: Bilateral air entry no rhonchi or wheezes  Cardiovascular examination: S1, S2  Abdominal examination: Soft, nontender, bowel sounds present  Extremities: nontender, no edema      Significant therapeutic interventions: As above    Significant Diagnostic Studies:   Labs / Micro:  CBC:   Lab Results   Component Value Date    WBC 25.1 07/05/2020    RBC 3.20 07/05/2020    HGB 7.6 07/07/2020    HCT 26.5 07/07/2020    MCV 80.0 07/05/2020    MCH 23.8 07/05/2020    MCHC 29.7 07/05/2020    RDW 17.1 07/05/2020     07/05/2020     BMP:    Lab Results   Component Value Date    GLUCOSE 93 07/06/2020     07/06/2020    K 3.6 07/06/2020     07/06/2020    CO2 26 07/06/2020    ANIONGAP 10 07/06/2020    BUN 10 07/06/2020    CREATININE 0.53 07/06/2020    BUNCRER NOT REPORTED 07/06/2020    CALCIUM 8.3 07/06/2020    LABGLOM >60 07/06/2020    GFRAA >60 07/06/2020    GFR      07/06/2020    GFR NOT REPORTED 07/06/2020        Radiology:  Xr Acute Abd Series Chest 1 Vw    Result Date: 7/5/2020  Bilateral heterogeneous pulmonary opacities with left basilar consolidation. Differential considerations include atelectasis, asymmetric pulmonary edema and an infectious/inflammatory process. Small left pleural effusion. No intraperitoneal free air. Nonobstructive bowel gas pattern. Gastrostomy tube overlies the left upper quadrant.        Consultations:    Consults:     Final Specialist Recommendations/Findings:   IP CONSULT TO GENERAL SURGERY  IP CONSULT TO CARDIOLOGY  IP CONSULT TO GI  IP CONSULT TO HOSPITALIST  IP CONSULT TO GI  IP CONSULT TO GI  IP CONSULT TO INFECTIOUS DISEASES      The patient was seen and examined on day of discharge and this discharge summary is in conjunction with any daily progress note from day of discharge. Discharge plan:     Disposition: SNF    Physician Follow Up:     Estefania Echeverria MD  [address] [facility] [address]  [address], [address], [address]  [address] [facility] [address]  [phone]    Schedule an appointment as soon as possible for a visit         Requiring Further Evaluation/Follow Up POST HOSPITALIZATION/Incidental Findings: Keep head of the bed up to at least 45 degrees  CBC and hemoglobin monitoring by nursing home doctor  Resume antidepressants once patient is finished with Zyvox    Diet: cardiac diet    Activity: As tolerated        Discharge Medications:      Medication List      START taking these medications    ferrous sulfate 325 (65 Fe) MG tablet  Commonly known as:  IRON 325  Take 1 tablet by mouth 2 times daily     linezolid 600 MG tablet  Commonly known as:  Zyvox  Take 1 tablet by mouth 2 times daily for 6 days     pantoprazole 40 MG tablet  Commonly known as:  PROTONIX  Take 1 tablet by mouth 2 times daily  Replaces:  pantoprazole 40 MG injection        CHANGE how you take these medications    amitriptyline 25 MG tablet  Commonly known as:  ELAVIL  Take 1 tablet by mouth nightly  Start taking on:  July 14, 2020  What changed:    · when to take this  · These instructions start on July 14, 2020. If you are unsure what to do until then, ask your doctor or other care provider. busPIRone 10 MG tablet  Commonly known as:  BUSPAR  Take 2 tablets by mouth 3 times daily  Start taking on:  July 14, 2020  What changed: These instructions start on July 14, 2020. If you are unsure what to do until then, ask your doctor or other care provider. clonazePAM 0.5 MG tablet  Commonly known as:  KLONOPIN  Take 0.5 tablets by mouth 2 times daily for 60 days.  Taking 1/2 tablet BID  What changed:  how much to take     escitalopram 20 MG tablet  Commonly known as:  LEXAPRO  Take 1 tablet by mouth daily  Start taking on:  July 14, 2020  What changed: These instructions start on July 14, 2020. If you are unsure what to do until then, ask your doctor or other care provider.         CONTINUE taking these medications    Acetaminophen 500 MG Caps     calcium carbonate 500 MG chewable tablet  Commonly known as:  TUMS     docusate 50 MG/5ML liquid  Commonly known as:  COLACE  10 mLs by Per G Tube route 2 times daily     furosemide 20 MG tablet  Commonly known as:  Lasix  Take 1 tablet by mouth daily     gabapentin 300 MG capsule  Commonly known as:  NEURONTIN     metoclopramide 5 MG/ML injection  Commonly known as:  REGLAN  Infuse 2 mLs intravenously every 6 hours     metoprolol succinate 25 MG extended release tablet  Commonly known as:  TOPROL XL  Take 1 tablet by mouth daily     midodrine 5 MG tablet  Commonly known as:  PROAMATINE  Take 1 tablet by mouth 3 times daily as needed (if SBP < 100)     Oyster Shell 500 MG Tabs     QUEtiapine 100 MG tablet  Commonly known as:  SEROQUEL  Take 1 tablet by mouth nightly     RA Vitamin D-3 50 MCG (2000 UT) Caps  Generic drug:  Cholecalciferol     simvastatin 40 MG tablet  Commonly known as:  ZOCOR     sucralfate 1 GM tablet  Commonly known as:  CARAFATE  Take 1 tablet by mouth 4 times daily     traZODone 100 MG tablet  Commonly known as:  DESYREL  Take 1 tablet by mouth nightly        STOP taking these medications    pantoprazole 40 MG injection  Commonly known as:  PROTONIX  Replaced by:  pantoprazole 40 MG tablet           Where to Get Your Medications      You can get these medications from any pharmacy    Bring a paper prescription for each of these medications  · clonazePAM 0.5 MG tablet     Information about where to get these medications is not yet available    Ask your nurse or doctor about these medications  · amitriptyline 25 MG tablet  · busPIRone 10 MG tablet  · escitalopram 20 MG tablet  · ferrous sulfate 325 (65 Fe) MG tablet  · linezolid 600 MG

## 2020-07-07 NOTE — PROGRESS NOTES
Patient states she is feeling well. Tolerating po. Afebrile VSS    Lungs:  Fair to good aeration    Heart:  Reg    Abd:  Soft, nontender    Labs:  Hgb stable. A/P:    Severe ulcerated esophagitis. Discussed in detail with patient, her findings and the fact that free reflux was seen during endoscopy. Reinforced that she needs to have her HOB elevated at all times for now, stay on medication and have repeat EGD as OP in 8-12 weeks. Also the fact that she may need repeat anti-reflux surgery. She claimed to understand. From GI standpoint OK to discharge once cleared by medicine as long as orders are sent to whichever rehab facility she is going to that she needs the [facility] elevated at all times at least 45 degrees, stay on anti-peptic therapy and given GI follow up at least in 6-8 weeks. Please recall IP GI service as needed.

## 2020-07-07 NOTE — PROGRESS NOTES
infectious/inflammatory process. I have personally reviewed the past medical history, past surgical history, medications, social history, and family history, and I haveupdated the database accordingly.   Past Medical History:     Past Medical History:   Diagnosis Date    Anxiety     Arthritis     Back pain     Bronchitis     Caffeine use     8 coffee / day    Carpal tunnel syndrome     Cataracts, bilateral     Constipation     Depression     Diarrhea     GERD (gastroesophageal reflux disease)     H/O gastric ulcer     Hyperlipidemia     Hypertension     Mumps     MVP (mitral valve prolapse)     Dr. Margaret Aguayo in May 2019    Recurrent UTI     Dr. Damon Matthew    Sinusitis     Trache[facility] in place [facility])     Ulcerative esophagitis     Wellness examination     Dr. Karoline Garcia seen in Jan 2020       Past Surgical  History:     Past Surgical History:   Procedure Laterality Date    APPENDECTOMY      CARPAL TUNNEL RELEASE      CHOLECYSTECTOMY, LAPAROSCOPIC  05/22/2020     LAPAROSCOPIC ROBOTIC CHOLECYSTECTOMY, PYLOROPLASTY     CHOLECYSTECTOMY, LAPAROSCOPIC N/A 5/22/2020    XI LAPAROSCOPIC ROBOTIC CHOLECYSTECTOMY, PYLOROPLASTY performed by Lauren eHndrix MD at [address]      EGD  3/17/2020         ERCP  05/21/2020    with stent insertion, balloon dilation, sphinctereotomy    ERCP  5/21/2020    ** CASE IN OR WITY GI STAFF** ERCP STENT INSERTION performed by Alexus Mayo MD at [facility]    ERCP  5/21/2020    ** CASE IN OR WITH GI STAFF**ERCP SPHINCTER/PAPILLOTOMY performed by Alexus Mayo MD at [facility]    ERCP  5/21/2020    ** CASE IN OR WITH GI STAFF**ERCP DILATION BALLOON performed by Alexus Mayo MD at [address]    GASTRIC FUNDOPLICATION N/A 1/42/1311    XI LAPAROSCOPIC ROBOTIC HIATAL HERNIA REPAIR, CHERYL FUNDOPLICATION performed by Lauren Hendrix MD at [facility] N/A 3/27/2020    EGD PEG TUBE PLACEMENT performed by Jean Kinsey MD at [address] [facility], [facility]. CATH POWER PICC TRIPLE  3/4/2020         HYSTERECTOMY      Abdominal    JOINT REPLACEMENT Right     right hip    OVARY REMOVAL      RHINOPLASTY      TONSILLECTOMY      TOTAL HIP ARTHROPLASTY      TOTAL NEPHRECTOMY      atrophic from infections, age 13   Dorothy Juventino TRACHEOSTOMY N/A 3/27/2020    **ADD ON **WANTS 10:00AM **TRACHEOTOMY performed by Jean Kinsey MD at [address] N/A 4/2/2020    TRACHEOTOMY EXCHANGE performed by Jean Kinsey MD at [address] ENDOSCOPY N/A 3/17/2020    BEDSIDE EGD ESOPHAGOGASTRODUODENOSCOPY (ICU) performed by Jean Kinsey MD at [facility]    UPPER GASTROINTESTINAL ENDOSCOPY N/A 5/18/2020    EGD BIOPSY performed by Hill Mcmillan MD at [address] [place] [address]  5/18/2020    EGD DILATION BALLOON performed by Hill Mcmillan MD at [address] [place] [address] N/A 7/6/2020    ** CASE IN O.R. WITH GI STAFF** EGD ESOPHAGOGASTRODUODENOSCOPY performed by Dhara Mueller MD at [facility]       Medications:      iron sucrose  200 mg Intravenous Q24H    linezolid  600 mg Intravenous Q12H    pantoprazole  40 mg Intravenous BID    And    sodium chloride (PF)  10 mL Intravenous BID    amitriptyline  25 mg Oral TID    busPIRone  20 mg Oral TID    clonazePAM  0.5 mg Oral BID    escitalopram  20 mg Oral Daily    gabapentin  300 mg Oral TID    metoclopramide  10 mg Intravenous Q6H    metoprolol succinate  25 mg Oral Daily    furosemide  20 mg Oral Daily    QUEtiapine  100 mg Oral Nightly    traZODone  100 mg Oral Nightly    sodium chloride  500 mL Intravenous Once    enoxaparin  40 mg Subcutaneous Daily    sodium chloride flush  10 mL Intravenous 2 times per day       Social History:     Social History     Socioeconomic History    Marital status:       Spouse name: Not on file    Number of children: 2    Years of education: Not on file    Highest education level: Not on file   Occupational History    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   Social Needs    Financial resource strain: Not on file    Food insecurity     Worry: Not on file     Inability: Not on file   [employer] needs     Medical: Not on file     Non-medical: Not on file   Tobacco Use    Smoking status: Current Every Day Smoker     Packs/day: 1.00     Years: 50.00     Pack years: 50.00     Types: Cigarettes    Smokeless tobacco: Never Used   Substance and Sexual Activity    Alcohol use: No    Drug use: No    Sexual activity: Never   Lifestyle    Physical activity     Days per week: Not on file     Minutes per session: Not on file    Stress: Not on file   Relationships    Social connections     Talks on phone: Not on file     Gets together: Not on file     Attends Alevism service: Not on file     Active member of club or organization: Not on file     Attends meetings of clubs or organizations: Not on file     Relationship status: Not on file    Intimate partner violence     Fear of current or ex partner: Not on file     Emotionally abused: Not on file     Physically abused: Not on file     Forced sexual activity: Not on file   Other Topics Concern    Not on file   Social History Narrative    Not on file       Family History:     Family History   Problem Relation Age of Onset    Cancer Mother         uterine    Heart Attack Mother     Heart Attack Father     Other Maternal Grandfather         foot gangrene    Other Paternal Grandmother         bleeding ulcers    Other Paternal Grandfather         lung cancer        Allergies:   Prednisone; Compazine [prochlorperazine maleate]; Penicillin v potassium; and Penicillins     Review of Systems:     Review of Systems   Constitutional: Positive for fatigue. Negative for activity change, chills and fever. HENT: Negative for congestion.     Eyes: Negative for visual disturbance. Respiratory: Negative for cough and shortness of breath. Cardiovascular: Negative for chest pain. Gastrointestinal: Positive for abdominal pain and vomiting. Endocrine: Negative for polyuria. Genitourinary: Negative for dysuria and frequency. Musculoskeletal: Positive for myalgias. Negative for arthralgias. Skin: Negative for color change. Allergic/Immunologic: Negative for immunocompromised state. Neurological: Negative for dizziness, facial asymmetry and numbness. Hematological: Negative for adenopathy. Psychiatric/Behavioral: Negative for agitation. Physical Examination :     Patient Vitals for the past 8 hrs:   BP Temp Temp src Pulse Resp SpO2   07/07/20 0945 -- -- -- -- -- 100 %   07/07/20 0915 -- -- -- 74 27 100 %   07/07/20 0900 -- -- -- 75 23 99 %   07/07/20 0845 -- -- -- 72 26 96 %   07/07/20 0830 -- -- -- 78 26 97 %   07/07/20 0815 -- -- -- 80 23 98 %   07/07/20 0800 -- -- -- 84 25 100 %   07/07/20 0745 -- -- -- 82 21 98 %   07/07/20 0730 -- -- -- 80 19 100 %   07/07/20 0726 113/65 98.2 °F (36.8 °C) Oral 80 17 97 %   07/07/20 0715 -- -- -- 70 24 97 %   07/07/20 0700 -- -- -- 78 26 99 %   07/07/20 0645 -- -- -- 68 22 97 %       Physical Exam  Constitutional:       General: She is not in acute distress. Appearance: Normal appearance. She is not ill-appearing. HENT:      Head: Normocephalic and atraumatic. Nose: Nose normal. No congestion. Mouth/Throat:      Mouth: Mucous membranes are moist.   Eyes:      General: No scleral icterus. Conjunctiva/sclera: Conjunctivae normal.      Pupils: Pupils are equal, round, and reactive to light. Neck:      Musculoskeletal: Neck supple. No neck rigidity. Cardiovascular:      Rate and Rhythm: Normal rate and regular rhythm. Heart sounds: Normal heart sounds. Pulmonary:      Effort: No respiratory distress. Breath sounds: No wheezing. Abdominal:      General: There is no distension. Tenderness: There is abdominal tenderness. Genitourinary:     Comments: chu w clear urine  Musculoskeletal:         General: No swelling or deformity. Skin:     Coloration: Skin is not jaundiced or pale. Findings: No bruising or erythema. Neurological:      General: No focal deficit present. Mental Status: She is alert and oriented to person, place, and time. Mental status is at baseline. Psychiatric:         Mood and Affect: Mood normal.         Thought Content: Thought content normal.           Medical Decision Making:   I have independently reviewed/ordered the following labs:    CBC with Differential:   Recent Labs     07/05/20 0428 07/06/20  2220 07/07/20 0514   WBC 25.1*  --   --   --    HGB 7.6*   < > 8.0* 7.6*   HCT 25.6*   < > 25.8* 26.5*     --   --   --    LYMPHOPCT 6*  --   --   --    MONOPCT 5  --   --   --     < > = values in this interval not displayed. BMP:  Recent Labs     07/05/20 0428 07/06/20 0428   * 141   K 3.9 3.6*   CL 96* 105   CO2 25 26   BUN 21 10   CREATININE 0.64 0.53   MG 1.5*  --      Hepatic Function Panel:   Recent Labs     07/05/20 0428   PROT 6.1*   LABALBU 2.9*   BILITOT 0.19*   ALKPHOS 104   ALT 22   AST 22     No results for input(s): RPR in the last 72 hours. No results for input(s): HIV in the last 72 hours. No results for input(s): BC in the last 72 hours. Lab Results   Component Value Date    CREATININE 0.53 07/06/2020    GLUCOSE 93 07/06/2020       Detailed results: Thank you for allowing us to participate in the care of this patient. Please call with questions. This note is created with the assistance of a speech recognition program.  While intending to generate adocument that actually reflects the content of the visit, the document can still have some errors including those of syntax and sound a like substitutions which may escape proof reading.   It such instances, actual meaningcan be extrapolated by contextual diversion.     Daniel Dhillon MD  Office: [phone]  Perfect serve / office [phone]

## 2020-07-07 NOTE — PROGRESS NOTES
Patient arrived to unit from [address] via hospital bed. VS were obtained. Patient was oriented to room. Patient was turned so writer could assess back. Brief was changed. Sacrum was red with non-blanchable erythema, writer will add to LDAs as stage 1 pressure ulcer. Zinc cream was applied. Bed remains locked and in lowest position with call light in place.

## 2020-07-07 NOTE — DISCHARGE INSTR - COC
 TOTAL NEPHRECTOMY      atrophic from infections, age 13   Mary Gilmore TRACHEOSTOMY N/A 3/27/2020    **ADD ON **WANTS 10:00AM **TRACHEOTOMY performed by Shoshana Espino MD at [address] [facility] [address] N/A 4/2/2020    TRACHEOTOMY EXCHANGE performed by Shoshana Espino MD at [address] [facility] 3/17/2020    BEDSIDE EGD ESOPHAGOGASTRODUODENOSCOPY (ICU) performed by Shoshana Espino MD at [address] [place] [address] N/A 5/18/2020    EGD BIOPSY performed by Renata Bernstein MD at [address] [place] [address]  5/18/2020    EGD DILATION BALLOON performed by Renata Bernstein MD at [address] [place] [address] N/A 7/6/2020    ** CASE IN O.R. WITH GI STAFF** EGD ESOPHAGOGASTRODUODENOSCOPY performed by Magalie Wood MD at [facility]       Immunization History: There is no immunization history on file for this patient.     Active Problems:  Patient Active Problem List   Diagnosis Code    Frequency of urination R35.0    Back pain M54.9    GERD (gastroesophageal reflux disease) K21.9    MVP (mitral valve prolapse) I34.1    Depression F32.9    Anxiety F41.9    Urine retention R33.9    Dysphagia R13.10    Paraesophageal hernia K44.9    History of repair of hiatal hernia Z98.890, Z87.19    Centrilobular emphysema ([address] J43.2    Esophageal dilatation K22.8    Acute on chronic blood loss anemia D62    Shock ([address] R57.9    Fever R50.9    Critical illness polyneuropathy ([address] G62.81    Gastric outlet obstruction K31.1    Tracheostomy in place ([address] Z93.0    H/O gastric ulcer Z87.19    Hyperlipidemia E78.5    Hypertension I10    Tobacco abuse Z72.0    Chronic respiratory failure with hypoxia (HCC) J96.11    Status post insertion of percutaneous endoscopic gastrostomy (PEG) tube (HCC) Z93.1    S/P Nissen fundoplication (without gastrostomy tube) procedure Z98.890    Acute upper GI bleed K92.2    Acute blood loss anemia D62    Phlebitis after infusion T80. 1XXA, I80.9       Isolation/Infection:   Isolation          No Isolation        Patient Infection Status     Infection Onset Added Last Indicated Last Indicated By Review Planned Expiration Resolved Resolved By    None active    Resolved    COVID-19 Rule Out 07/05/20 07/05/20 07/05/20 COVID-19 (Ordered)   07/05/20 Rule-Out Test Resulted    C-diff Rule Out 05/27/20 05/27/20 05/27/20 C DIFF TOXIN/ANTIGEN (Ordered)   05/29/20 Chantell Burt RN    COVID-19 Rule Out 05/20/20 05/20/20 05/20/20 COVID-19 (Ordered)   05/21/20 Rule-Out Test Resulted    COVID-19 Rule Out 05/14/20 05/14/20 05/14/20 COVID-19 (Ordered)   05/14/20 Rule-Out Test Resulted          Nurse Assessment:  Last Vital Signs: /69   Pulse 75   Temp 98.2 °F (36.8 °C) (Oral)   Resp 23   Ht 5' 3\" (1.6 m)   Wt 190 lb 4.1 oz (86.3 kg)   SpO2 100%   BMI 33.70 kg/m²     Last documented pain score (0-10 scale): Pain Level: 4  Last Weight:   Wt Readings from Last 1 Encounters:   07/07/20 190 lb 4.1 oz (86.3 kg)     Mental Status:  alert    IV Access:  - None    Nursing Mobility/ADLs:  Walking   Dependent  Transfer  Dependent  Bathing  Dependent  Dressing  Dependent  Toileting  Assisted  Feeding  Assisted  Med Admin  Dependent  Med Delivery   whole    Wound Care Documentation and Therapy:  Wound 04/01/20 Neck Mid;Distal non-healing area of tracheostomy incision (Active)   Number of days: 97       Wound 07/06/20 Coccyx Stage 1 (Active)   Wound Pressure Stage  1 7/7/2020  7:25 AM   Dressing Status Clean;Dry; Intact 7/7/2020  9:25 AM   Wound Cleansed Other (Comment) 7/6/2020 10:15 PM   Wound Assessment Red;Non-blanchable erythema 7/7/2020  9:25 AM   Drainage Amount None 7/7/2020  9:25 AM   Odor None 7/7/2020  9:25 AM   Any-wound Assessment Intact 7/7/2020  9:25 AM   Number of days: 0        Elimination:  Continence:   · Bowel: No  · Bladder: No  Urinary Catheter: None   Colostomy/Ileostomy/Ileal Conduit: No       Date of Last BM: 07/07/2020    Intake/Output Summary (Last 24 hours) at 7/7/2020 1422  Last data filed at 7/7/2020 1322  Gross per 24 hour   Intake 530 ml   Output 2600 ml   Net -2070 ml     I/O last 3 completed shifts: In: 56 [P.O.:200; I.V.:130; IV Piggyback:300]  Out: 2950 [Urine:2950]    Safety Concerns: At Risk for Falls    Impairments/Disabilities:      None    Nutrition Therapy:  Current Nutrition Therapy:   - Oral Diet:  Clear Liquid    Routes of Feeding: Oral  Liquids: No Restrictions  Daily Fluid Restriction: no  Last Modified Barium Swallow with Video (Video Swallowing Test): not done    Treatments at the Time of Hospital Discharge:   Respiratory Treatments: ***  Oxygen Therapy:  is on oxygen at 2-4 L/min per nasal cannula.   Ventilator:    - No ventilator support    Rehab Therapies: Physical Therapy, Occupational Therapy and Speech/Language Therapy  Weight Bearing Status/Restrictions: No weight bearing restirctions  Other Medical Equipment (for information only, NOT a DME order):  {EQUIPMENT:[id number]}  Other Treatments: Dietitian     Patient's personal belongings (please select all that are sent with patient):  None    RN SIGNATURE:  Electronically signed by Tomas Peraza RN on 7/7/20 at 5:57 PM EDT    CASE MANAGEMENT/SOCIAL WORK SECTION    Inpatient Status Date: ***    Readmission Risk Assessment Score:  Readmission Risk              Risk of Unplanned Readmission:        30           Discharging to Facility/ Agency   · Name: [facility] [address], [place] [address]         Phone: [phone]       Fax: [phone]          Dialysis Facility (if applicable)   · Name:  · Address:  · Dialysis Schedule:  · Phone:  · Fax:    / signature: Electronically signed by Melvi Madrid RN on 7/7/20 at 5:26 PM EDT    PHYSICIAN SECTION    Prognosis: Fair    Condition at Discharge: Stable    Rehab Potential (if transferring to Rehab): Fair    Recommended Labs or Other Treatments After Discharge:   CBC on 7/8/2020 and 7/14/2020. Results to nursing home doctor  PT and OT eval and treat  Monitor vitals, oxygen via nasal cannula 2 L to keep saturation more than 92%  Aerosols as needed  Speech therapy eval and treat  Keep head of the bed up 45 degrees to prevent aspiration  Follow-up with GI and general surgery  DO NOT take Lexapro, amitriptyline, BuSpar until on Zyvox. Can restart once finished with Zyvox  Physician Certification: I certify the above information and transfer of Roxana Covarrubias  is necessary for the continuing treatment of the diagnosis listed and that she requires Bob Haseeb for greater 30 days.      Update Admission H&P: Changes in H&P as follows - See discharge summary    PHYSICIAN SIGNATURE:  Electronically signed by Whit Mahmood MD on 7/7/20 at 2:24 PM EDT

## 2020-07-07 NOTE — CARE COORDINATION
TRANSITIONAL CARE PLANNING/ 2 Rehab Mahamed Day: 2    Reason for Admission: Acute blood loss anemia [D62]  Acute blood loss anemia [D62]     Treatment Plan of Care:EGD yesterday GI signed off. Hgb improving 7.6 today  Cardiology signed off.  ID following     Tests/Procedures still needed:     Barriers to Discharge:     Readmission Risk              Risk of Unplanned Readmission:        30            Patient goals/Treatment Preferences/Transitional Plan:   Spoke with Mundo Harrell at [facility], pt is able to return when medically stable   Referrals Made:     Follow Up needed:

## 2020-07-07 NOTE — PROGRESS NOTES
[facility] Minimally Invasive Surgery Progress Note            PATIENT NAME: Rosie Lobato     TODAY'S DATE: 2020, 10:33 AM    Chief Complaint   Patient presents with    Abdominal Pain    Nausea & Vomiting       SUBJECTIVE:    Patient seen and chart reviewed. Hemoglobin 7.6 from 6.9 yesterday status post 1 unit packed cells. Underwent EGD yesterday and tolerated well. OBJECTIVE:   Vitals:  /65   Pulse 74   Temp 98.2 °F (36.8 °C) (Oral)   Resp 27   Ht 5' 3\" (1.6 m)   Wt 190 lb 4.1 oz (86.3 kg)   SpO2 100%   BMI 33.70 kg/m²    TEMPERATURE:  Current - Temp: 98.2 °F (36.8 °C); Max - Temp  Av.5 °F (36.9 °C)  Min: 97 °F (36.1 °C)  Max: 100.4 °F (38 °C)    INTAKE/OUTPUT:      Intake/Output Summary (Last 24 hours) at 2020 1033  Last data filed at 2020 6676  Gross per 24 hour   Intake 630 ml   Output 2950 ml   Net -2320 ml                 General: AOx3, NAD  Lungs: Symmetrical chest rise bilaterally, no audible wheezes or rhonchi  Heart: Regular rate and rhythm  Abdomen: Soft, mildly tender to palpation in epigastrium and right upper quadrant. G tube in place. Extremity: Moves all extremities x4, palpable pulses in upper and lower extremities bilaterally  Genitourinary: Saldana catheter in place with 700 cc urine charted. Data Review:  CBC:   Recent Labs     20  1757 20  2220 20  0514   WBC 25.1*  --   --   --   --    HGB 7.6*   < > 8.0* 8.0* 7.6*     --   --   --   --     < > = values in this interval not displayed.      BMP:    Recent Labs     20   * 141   K 3.9 3.6*   CL 96* 105   CO2 25 26   BUN 21 10   CREATININE 0.64 0.53   GLUCOSE 128* 93     Hepatic:   Recent Labs     20   AST 22   ALT 22   ALKPHOS 104   BILITOT 0.19*     Coagulation:   Recent Labs     2005/20  0633 20  0415   PROT 6.1*  --   --    INR  --  1.2 1.1       ASSESSMENT     77 yo F with complicated surgical history presented with hematemesis due to upper GI bleed. Also with   -s/p robotic nissen fundoplication and hiatal hernia repair 2/24/20  -s/p trach and PEG 3/27/20  -s/p cholecystectomy and pyloroplasty s/p 5/24/2020    Patient Active Problem List   Diagnosis    Frequency of urination    Back pain    GERD (gastroesophageal reflux disease)    MVP (mitral valve prolapse)    Depression    Anxiety    Urine retention    Dysphagia    Paraesophageal hernia    History of repair of hiatal hernia    Centrilobular emphysema (HCC)    Esophageal dilatation    Acute on chronic blood loss anemia    Shock ([address]    Fever    Critical illness polyneuropathy ([address]    Gastric outlet obstruction    Tracheostomy in place ([address]    H/O gastric ulcer    Hyperlipidemia    Hypertension    Tobacco abuse    Chronic respiratory failure with hypoxia (HCC)    Status post insertion of percutaneous endoscopic gastrostomy (PEG) tube ([address]    S/P Nissen fundoplication (without gastrostomy tube) procedure    Acute upper GI bleed    Acute blood loss anemia     PLAN  -Agree with GI recommendations  -If persistent GI bleed, recommend transfer to tertiary center for consideration for anti-reflux surgery.  -No emergent surgery needed at this time.  -Transfuse PRN.     Neelam Cox MD  General Surgery PGY3  Available via [employer]  Attending: Rashida Montesinos MD

## 2020-07-07 NOTE — PROGRESS NOTES
Pt transported to bed 401. Handed off to [facility]. No personal belongings noted by patient or found in the room.

## 2020-07-12 LAB — INTERVENTION: NORMAL

## 2020-07-13 ENCOUNTER — TELEPHONE (OUTPATIENT)
Dept: GASTROENTEROLOGY | Age: 75
End: 2020-07-13

## 2020-07-13 NOTE — TELEPHONE ENCOUNTER
Received a call from [facility] regarding the patient's appointment on 7/14/20. Stated that patient refuses to have an office visit but will do a phone visit. Appointment changed and advised that they may receive a call between 9:15 and 11:30 am.  Writer thanked and call ended.         Abhijit [phone]

## 2020-07-14 ENCOUNTER — TELEMEDICINE (OUTPATIENT)
Dept: GASTROENTEROLOGY | Age: 75
End: 2020-07-14
Payer: MEDICARE

## 2020-07-14 PROCEDURE — 1111F DSCHRG MED/CURRENT MED MERGE: CPT | Performed by: INTERNAL MEDICINE

## 2020-07-14 PROCEDURE — 3017F COLORECTAL CA SCREEN DOC REV: CPT | Performed by: INTERNAL MEDICINE

## 2020-07-14 PROCEDURE — 1123F ACP DISCUSS/DSCN MKR DOCD: CPT | Performed by: INTERNAL MEDICINE

## 2020-07-14 PROCEDURE — 4040F PNEUMOC VAC/ADMIN/RCVD: CPT | Performed by: INTERNAL MEDICINE

## 2020-07-14 PROCEDURE — 1090F PRES/ABSN URINE INCON ASSESS: CPT | Performed by: INTERNAL MEDICINE

## 2020-07-14 PROCEDURE — G8428 CUR MEDS NOT DOCUMENT: HCPCS | Performed by: INTERNAL MEDICINE

## 2020-07-14 PROCEDURE — G8400 PT W/DXA NO RESULTS DOC: HCPCS | Performed by: INTERNAL MEDICINE

## 2020-07-14 PROCEDURE — 99443 PR PHYS/QHP TELEPHONE EVALUATION 21-30 MIN: CPT | Performed by: INTERNAL MEDICINE

## 2020-07-14 ASSESSMENT — ENCOUNTER SYMPTOMS
COUGH: 0
ABDOMINAL PAIN: 0
VOMITING: 0
TROUBLE SWALLOWING: 0
RECTAL PAIN: 0
CHOKING: 0
CONSTIPATION: 0
ABDOMINAL DISTENTION: 0
BLOOD IN STOOL: 0
DIARRHEA: 1
ANAL BLEEDING: 0
NAUSEA: 0
WHEEZING: 0

## 2020-07-14 NOTE — PROGRESS NOTES
Salvador Hodge is a 76 y.o. female evaluated via telephone on 7/14/2020. Consent:  She and/or health care decision maker is aware that that she may receive a bill for this telephone service, depending on her insurance coverage, and has provided verbal consent to proceed: Yes      GI  FOLLOW UP    INTERVAL HISTORY:   No referring provider defined for this encounter. Chief Complaint   Patient presents with    Follow-up     Patient is f/u on hospital visit and EGD and ERCP. Precharting was completed with nurse [facility] at [facility]. HISTORY OF PRESENT ILLNESS: Yasmeen Coombs is a 76 y.o. female , referred for evaluation of  below  I started seeing this patient in the hospital, when she was admitted with post operative outlet obstruction, elevated liver enzymes, large hiatal hernia status post robotic repair with fundoplication, PEG and trach. Work-up for the elevated liver enzymes showed that the patient had Stephany lithiasis without cholecystitis, there was dilatation of the CBD, other liver disease markers were negative, EGD was done showed severe esophagitis, severe gastritis, pyloric stenosis which was dilated, MRI was done showed CBD obstruction for which an ERCP was done, was difficult to read please refer to the ERCP result. After that the patient presented again to the hospital with GI bleed, visiting gastroenterologist in [facility]. Brian's did another scope on her she did have severe esophagitis the same way no other abnormality. This is a phone visit with the patient, and her nurse, and her doctor, Irma Agudelo    Patient is doing remarkably well she said she is eating and drinking well they advanced her from clear liquid to full liquid and now to soft she is eating she has no severe dysphagia or odynophagia although she is not completely normal yet.     She does not have any abdominal pain she does not have any black stool blood in the stool nausea or vomiting  Her repeat hemoglobin right now is 10.2    The liver enzyme were not repeated after discharge but she has no abdominal pain as mentioned or no other right upper quadrant discomfort        PROCEDURE NOTE     DATE OF PROCEDURE: 5/18/2020      SURGEON: Jersey Garcia MD  [facility]ility: [facility]  ASSISTANT: None  Anesthesia: MAc   PREOPERATIVE DIAGNOSIS:   N/v  S/p robotic hiatal hernia repair with Mark fundoplication (7/03/7211)     Diagnosis:  Severe esophagitis with geographic appearance to mucosa, with spared araes and erythematous inflamed areas, the entire lower 1/3 of the esophagus is looking like that .        Severe nodular type  gastritis, bxs taken   PEG in place      Pyloric orifice was tight at first and was difficult to pass scope through but after that it was wide open, but because of the barium study result I did dilate the pyloric orifice with 15-16-18 mm balloon   Now it is wide open see image                  POSTOPERATIVE DIAGNOSIS: As described below     OPERATION: Upper GI endoscopy with Biopsy     ANESTHESIA: Moderate Sedation      ESTIMATED BLOOD LOSS: Less than 50 ml     COMPLICATIONS: None.      SPECIMENS:  Was Obtained:  As above      HISTORY: The patient is a 76y.o. year old female with history of above preop diagnosis. I recommended esophagogastroduodenoscopy with possible biopsy and I explained the risk, benefits, expected outcome, and alternatives to the procedure. Risks included but are not limited to bleeding, infection, respiratory distress, hypotension, and perforation of the esophagus, stomach, or duodenum.   Patient understands and is in agreement.        The patient was counseled at length about the risks of jhoan Covid-19 during their perioperative period and any recovery window from their procedure.  The patient was made aware that jhoan Covid-19  may worsen their prognosis for recovering from their procedure  and lend to a higher morbidity and/or mortality risk.  All material risks, benefits, and reasonable alternatives including postponing the procedure were discussed. The patient does wish to proceed with the procedure at this time.           PROCEDURE: The patient was given IV conscious sedation. The patient's SPO2 remained above 90% throughout the procedure. The gastroscope was inserted orally and advanced under direct vision through the esophagus, through the stomach, through the pylorus, and into the descending duodenum.       Post sedation note : The patient's SPO2 remained above 90% throughout the procedure. the vital signs remained stable , and no immediate complication form the procedure noted, patient will be ready for d/c when criteria is met .        Findings:     Retropharyngeal area was grossly normal appearing     Esophagus: abnormal: Severe esophagitis with geographic appearance to mucosa, with spared araes and erythematous inflamed areas, the entire lower 1/3 of the esophagus is looking like that .              Stomach:  Severe nodular type  gastritis, bxs taken   PEG in place      Pyloric orifice was tight at first and was difficult to pass scope through but after that it was wide open, but because of the barium study result I did dilate the pyloric orifice with 15-16-18 mm balloon   Now it is wide open see image      Duodenum:     Descending: normal    Bulb: normal     The scope was removed and the patient tolerated the procedure well.      Recommendations/Plan:   1. Start TF consciously   2. High dose PPI and Carafate  3. treat H pylori if positive   4. If tolerate TF will try liquid diet    Electronically signed by Tobi Steel MD  on 5/18/2020 at 6:51 PM-- Diagnosis --   STOMACH, BIOPSY:        - MILD CHRONIC GASTRITIS.        Paris Parker M.D.   **Electronically Signed Out**         [facility]/5/20/2020    Operative Note                                                                                                                ERCP        Patient: Salvador Hodge    :                                  1945  Acc#:                                 [id number]   Referring/PCP:             Rob Galvez MD  Date:                                2020   Facility:                       [facility]  Endoscopist:    MAURICIO Peña FACP,FACG  Procedure: ERCP with  cholangiogram  , sphincterotomy, balloon sweep, stent placement, pancreatogram, fluoroscopy                                                            Indication: CBD stone seen on MRCP           pre op diagnosis: see indication  Post op diagnosis: see findings  Complications: none, no blood loss               Anesthesia:  General      Description of Procedure:  Prior to the procedure, a history and physical exam was performed and informed consent was obtained. The risks were discussed including pancreatitis, bleeding, and perforation.          The patient was counseled at length about the risks of jhoan Covid-19 during their perioperative period and any recovery window from their procedure.  The patient was made aware that jhoan Covid-19  may worsen their prognosis for recovering from their procedure  and lend to a higher morbidity and/or mortality risk.  All material risks, benefits, and reasonable alternatives including postponing the procedure were discussed.  The patient does wish to proceed with the procedure at this time.           After the patient was placed in the prone position eved, the therapeutic duodenoscope scope was inserted into the mouth and advanced to the second portion of the duodenum allowing the papilla to be visualized.                Using the a wire guided approach with the sphincterotome, the CBD was cannulated and a cholangiogram was performed.          Findings:  The procedure was very limited, as per anesthesia could not put the patient in a prone position she has to be on her left side because of her trach, which makes the fluoroscopy picture especially with her deformities and anatomy very difficult, in addition to that she was breathing very heavy as she is not under general anesthesia, for which the appearance of the fluoroscopy was very difficult to read. Initial cholangiogram revealed no filling defect but the distal part of the CBD was very narrowed and strictured, the proximal part was dilated up to 9 mm - 10 mm, but curved and deformed, at first the intrahepatic ducts would not fill I have to put the balloon and inflated then I could see the intrahepatic ducts, we swept the balloon couple 3 times, no stone came out and it was difficult to push the balloon through the distal one third of the CBD which looked strictured for which I went ahead and placed the stent, 7 [place], 9 cm with that we have very good drainage.     Before I could cannulate the CBD the wire would go across the spine indicating that I was falling into the pancreatic duct, we injected the pancreatic duct once and it felt all the way to the tail.           \  Post sedation note : The patient's SPO2 remained above 90% throughout the procedure. the vital signs remained stable , and no immediate complication form the procedure noted, patient will be ready for d/c when criteria is met .     Plan:    Patient supposed to have surgery tomorrow  , With cholecystectomy  We will follow clinically and 4 weeks as an outpatient we can remove the stent and have another cholangiogram with better circumstances with the patient would be under general anesthesia with an ET tube instead of the trach, and we can place her on her prone position     Electronically signed by   Antoine Houser M.D.,FACP,FACG   on 5/21/2020 at 5:10 PM    Past Medical,Family, and Social History reviewed and does contribute to the patient presentingcondition. Patient's PMH/PSH,SH,PSYCH Hx, MEDs, ALLERGIES, and ROS were all reviewed and updated in the appropriate sections.     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13   [facility] TRACHEOSTOMY N/A 3/27/2020    **ADD ON **WANTS 10:00AM **TRACHEOTOMY performed by Christa Rogers MD at [facility] 4/2/2020    TRACHEOTOMY EXCHANGE performed by Christa Rogers MD at [facility] 3/17/2020    BEDSIDE EGD ESOPHAGOGASTRODUODENOSCOPY (ICU) performed by Christa Rogers MD at [address] [place] [address] N/A 5/18/2020    EGD BIOPSY performed by Nanci Walker MD at [address] [place] [address]  5/18/2020    EGD DILATION BALLOON performed by Nanci Walker MD at [address] [place] [address] N/A 7/6/2020    ** CASE IN O.R. WITH GI STAFF** EGD ESOPHAGOGASTRODUODENOSCOPY performed by Phuc Foy MD at [facility]       CURRENT MEDICATIONS:    Current Outpatient Medications:     clonazePAM (KLONOPIN) 0.5 MG tablet, Take 0.5 tablets by mouth 2 times daily for 60 days.  Taking 1/2 tablet BID, Disp: 5 tablet, Rfl: 0    pantoprazole (PROTONIX) 40 MG tablet, Take 1 tablet by mouth 2 times daily, Disp: , Rfl:     ferrous sulfate (IRON 325) 325 (65 Fe) MG tablet, Take 1 tablet by mouth 2 times daily, Disp: , Rfl:     amitriptyline (ELAVIL) 25 MG tablet, Take 1 tablet by mouth nightly, Disp: , Rfl:     busPIRone (BUSPAR) 10 MG tablet, Take 2 tablets by mouth 3 times daily, Disp: , Rfl:     escitalopram (LEXAPRO) 20 MG tablet, Take 1 tablet by mouth daily, Disp: , Rfl:     midodrine (PROAMATINE) 5 MG tablet, Take 1 tablet by mouth 3 times daily as needed (if SBP < 100), Disp: , Rfl:     metoprolol succinate (TOPROL XL) 25 MG extended release tablet, Take 1 tablet by mouth daily, Disp: 30 tablet, Rfl: 3    QUEtiapine (SEROQUEL) 100 MG tablet, Take 1 tablet by mouth nightly, Disp: 60 tablet, Rfl: 3    furosemide (LASIX) 20 MG tablet, Take 1 tablet by mouth daily, Disp: 60 tablet, Rfl: 3    docusate (COLACE) 50 MG/5ML liquid, 10 mLs by Per G Tube route 2 times daily, Disp: 100 mL, Rfl: 2    metoclopramide (REGLAN) 5 MG/ML injection, Infuse 2 mLs intravenously every 6 hours, Disp: 20 mL, Rfl: 0    sucralfate (CARAFATE) 1 GM tablet, Take 1 tablet by mouth 4 times daily, Disp: 120 tablet, Rfl: 3    traZODone (DESYREL) 100 MG tablet, Take 1 tablet by mouth nightly, Disp: 30 tablet, Rfl: 0    RA VITAMIN D-3 50 MCG (2000 UT) CAPS, take 1 capsule by mouth once daily, Disp: , Rfl:     Oyster Shell 500 MG TABS, Take 500 mg by mouth 2 times daily , Disp: , Rfl:     Acetaminophen 500 MG CAPS, Take 1 tablet by mouth as needed for Pain (follow OTC instructions), Disp: , Rfl:     calcium carbonate (TUMS) 500 MG chewable tablet, Take 1 tablet by mouth 3 times daily as needed for Heartburn, Disp: , Rfl:     gabapentin (NEURONTIN) 300 MG capsule, Take 300 mg by mouth 3 times daily. , Disp: , Rfl:     simvastatin (ZOCOR) 40 MG tablet, Take 40 mg by mouth nightly., Disp: , Rfl:     ALLERGIES:   Allergies   Allergen Reactions    Prednisone Anxiety    Compazine [Prochlorperazine Maleate]     Penicillin V Potassium     Penicillins Other (See Comments)     shock       FAMILY HISTORY:       Problem Relation Age of Onset    Cancer Mother         uterine    Heart Attack Mother     Heart Attack Father     Other Maternal Grandfather         foot gangrene    Other Paternal Grandmother         bleeding ulcers    Other Paternal Grandfather         lung cancer         SOCIAL HISTORY:   Social History     Socioeconomic History    Marital status:       Spouse name: Not on file    Number of children: 2    Years of education: Not on file    Highest education level: Not on file   Occupational History    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   Social Needs    Financial resource strain: Not on file    Food insecurity     Worry: Not on file     Inability: Not on file   [employer] needs     Medical: Not on file     Non-medical: Not on file Tobacco Use    Smoking status: Current Every Day Smoker     Packs/day: 1.00     Years: 50.00     Pack years: 50.00     Types: Cigarettes    Smokeless tobacco: Never Used   Substance and Sexual Activity    Alcohol use: No    Drug use: No    Sexual activity: Never   Lifestyle    Physical activity     Days per week: Not on file     Minutes per session: Not on file    Stress: Not on file   Relationships    Social connections     Talks on phone: Not on file     Gets together: Not on file     Attends Zoroastrian service: Not on file     Active member of club or organization: Not on file     Attends meetings of clubs or organizations: Not on file     Relationship status: Not on file    Intimate partner violence     Fear of current or ex partner: Not on file     Emotionally abused: Not on file     Physically abused: Not on file     Forced sexual activity: Not on file   Other Topics Concern    Not on file   Social History Narrative    Not on file       REVIEW OF SYSTEMS: A 12-point review of systemswas obtained and pertinent positives and negatives were enumerated above in the history of present illness. All other reviewed systems / symptoms were negative. Review of Systems   Constitutional: Positive for appetite change and fatigue. Negative for unexpected weight change. HENT: Negative for trouble swallowing. Respiratory: Negative for cough, choking and wheezing. Cardiovascular: Negative for chest pain, palpitations and leg swelling. Gastrointestinal: Positive for diarrhea. Negative for abdominal distention, abdominal pain (indigestion), anal bleeding, blood in stool, constipation, nausea, rectal pain and vomiting. Genitourinary: Negative for difficulty urinating. Allergic/Immunologic: Negative for environmental allergies and food allergies. Neurological: Negative for dizziness, weakness, light-headedness, numbness and headaches. Hematological: Bruises/bleeds easily.    Psychiatric/Behavioral: Negative for sleep disturbance. The patient is not nervous/anxious. LABORATORY DATA: Reviewed  Lab Results   Component Value Date    WBC 25.1 (H) 07/05/2020    HGB 7.6 (L) 07/07/2020    HCT 26.5 (L) 07/07/2020    MCV 80.0 (L) 07/05/2020     07/05/2020     07/06/2020    K 3.6 (L) 07/06/2020     07/06/2020    CO2 26 07/06/2020    BUN 10 07/06/2020    CREATININE 0.53 07/06/2020    LABALBU 2.9 (L) 07/05/2020    BILITOT 0.19 (L) 07/05/2020    ALKPHOS 104 07/05/2020    AST 22 07/05/2020    ALT 22 07/05/2020    INR 1.1 07/06/2020         Lab Results   Component Value Date    RBC 3.20 (L) 07/05/2020    HGB 7.6 (L) 07/07/2020    MCV 80.0 (L) 07/05/2020    MCH 23.8 (L) 07/05/2020    MCHC 29.7 07/05/2020    RDW 17.1 (H) 07/05/2020    MPV 10.5 07/05/2020    BASOPCT 0 07/05/2020    LYMPHSABS 1.51 07/05/2020    MONOSABS 1.26 (H) 07/05/2020    NEUTROABS 22.33 (H) 07/05/2020    EOSABS 0.00 07/05/2020    BASOSABS 0.00 07/05/2020         DIAGNOSTIC TESTING:     Xr Acute Abd Series Chest 1 Vw    Result Date: 7/5/2020  EXAMINATION: TWO XRAY VIEWS OF THE ABDOMEN AND SINGLE  XRAY VIEW OF THE CHEST 7/5/2020 5:01 am COMPARISON: 05/30/2020.  05/24/2020. HISTORY: ORDERING SYSTEM PROVIDED HISTORY: abdominal pain TECHNOLOGIST PROVIDED HISTORY: abdominal pain Reason for Exam: pain FINDINGS: Chest: Frontal view. Patient rotation to the left. Normal lung volume. Bilateral heterogeneous opacities with left basilar consolidation. Indistinct pulmonary vasculature. Small left pleural effusion. No pneumothorax. Cardiomegaly. Multilevel degenerative disc disease. Abdomen: Frontal supine and upright views. No intraperitoneal free air. Gastrostomy tube overlies the left upper quadrant. Grossly stable biliary stent. Nonspecific, nonobstructive bowel gas pattern. Stable regional skeleton. Bilateral heterogeneous pulmonary opacities with left basilar consolidation.  Differential considerations include atelectasis, asymmetric pulmonary edema and an infectious/inflammatory process. Small left pleural effusion. No intraperitoneal free air. Nonobstructive bowel gas pattern. Gastrostomy tube overlies the left upper quadrant. PHYSICAL EXAMINATION:     [ INSTRUCTIONS:  \"[x]\" Indicates a positive item  \"[]\" Indicates a negative item  -- DELETE ALL ITEMS NOT EXAMINED]  Vital Signs: (As obtained by patient/caregiver or practitioner observation)    Blood pressure-  Heart rate-    Respiratory rate-    Temperature-  Pulse oximetry-     Constitutional: [x] Appears well-developed and well-nourished [x] No apparent distress      [] Abnormal-   Mental status  [] Alert and awake  [] Oriented to person/place/time []Able to follow commands      Eyes:  EOM    [x]  Normal  [] Abnormal-  Sclera  [x]  Normal  [] Abnormal -         Discharge [x]  None visible  [] Abnormal -    HENT:   [x] Normocephalic, atraumatic. [] Abnormal   [x] Mouth/Throat: Mucous membranes are moist.     External Ears [x] Normal  [] Abnormal-     Neck: [x] No visualized mass     Pulmonary/Chest: [x] Respiratory effort normal.  [x] No visualized signs of difficulty breathing or respiratory distress        [] Abnormal-      Musculoskeletal:   [x] Normal gait with no signs of ataxia         [x] Normal range of motion of neck        [] Abnormal-       Neurological:        [x] No Facial Asymmetry (Cranial nerve 7 motor function) (limited exam to video visit)          [x] No gaze palsy        [] Abnormal-         Skin:        [x] No significant exanthematous lesions or discoloration noted on facial skin         [] Abnormal-            Psychiatric:       [x] Normal Affect [x] No Hallucinations        [] Abnormal-     Other pertinent observable physical exam findings-         IMPRESSION: Ms. Ashley Pruitt is a 76 y.o. female with      Diagnosis Orders   1. Esophageal dilatation     2. Acute upper GI bleed     3. Gastric outlet obstruction     4.  Gastroesophageal reflux disease with esophagitis     5. S/P Nissen fundoplication (without gastrostomy tube) procedure     6. Esophageal dysphagia     7. Elevated liver enzymes     8. Pain around percutaneous endoscopic gastrostomy (PEG) tube site, initial encounter       Will repeat the liver enzymes the nurse will fax the results to us  Otherwise we will continue with the same continue with a soft diet  We will see this patient back in few weeks we might need to repeat her EGD and dilate her again  We will evaluate the continuous need for PEG or not          Diet/life style/natural hx /complication of the dx were all explained in details   Past medical, past surgical, social history, psychiatric history, medications or allergies, all reviewed and  updated    Brandon Graham is a 76 y.o. female being evaluated by a Virtual Visit (video visit) encounter to address concerns as mentioned above. A caregiver was present when appropriate. Due to this being a TeleHealth encounter (During [facility]- public health emergency), evaluation of the following organ systems was limited: Vitals/Constitutional/EENT/Resp/CV/GI//MS/Neuro/Skin/Heme-Lymph-Imm. Pursuant to the emergency declaration under the [address] [place] [address], [address] [place] [address] authority and the [employer] Act, this Virtual Visit was conducted with patient's (and/or legal guardian's) consent, to reduce the patient's risk of exposure to COVID-19 and provide necessary medical care. The patient (and/or legal guardian) has also been advised to contact this office for worsening conditions or problems, and seek emergency medical treatment and/or call 911 if deemed necessary. Services were provided through a video synchronous discussion virtually to substitute for in-person clinic visit. Patient and provider were located at their individual homes.     -- on 7/14/2020 at 12:34 PM    Total Time: minutes: 21-30 minutes    Services were provided through a video synchronous discussion virtually to substitute for in-person clinic visit. Thank you for allowing me to participate in the care of Ms. Karlene Ferrara. For any further questions please do not hesitate to contact me. I have reviewed and agree with the ROS entered by the MA/RN. Note is dictated utilizing voice recognition software. Unfortunately this leads to occasional typographical errors. Please contact our office if you have any questions. An electronic signature was used to authenticate this note.       Hill Mcmillan MD  [facility]  O: #[phone]

## 2020-11-07 ENCOUNTER — APPOINTMENT (OUTPATIENT)
Dept: GENERAL RADIOLOGY | Age: 75
DRG: 380 | End: 2020-11-07
Payer: MEDICARE

## 2020-11-07 ENCOUNTER — HOSPITAL ENCOUNTER (INPATIENT)
Age: 75
LOS: 3 days | Discharge: NURSING, REHAB OR LONG-TERM CARE | DRG: 380 | End: 2020-11-10
Attending: EMERGENCY MEDICINE | Admitting: INTERNAL MEDICINE
Payer: MEDICARE

## 2020-11-07 ENCOUNTER — APPOINTMENT (OUTPATIENT)
Dept: CT IMAGING | Age: 75
DRG: 380 | End: 2020-11-07
Payer: MEDICARE

## 2020-11-07 PROBLEM — K92.2 GI BLEED: Status: ACTIVE | Noted: 2020-11-07

## 2020-11-07 LAB
ABO/RH: NORMAL
ABSOLUTE EOS #: 0.2 K/UL (ref 0–0.44)
ABSOLUTE IMMATURE GRANULOCYTE: 0.42 K/UL (ref 0–0.3)
ABSOLUTE LYMPH #: 1.48 K/UL (ref 1.1–3.7)
ABSOLUTE MONO #: 0.66 K/UL (ref 0.1–1.2)
ALBUMIN SERPL-MCNC: 3.4 G/DL (ref 3.5–5.2)
ALBUMIN/GLOBULIN RATIO: 1 (ref 1–2.5)
ALP BLD-CCNC: 123 U/L (ref 35–104)
ALT SERPL-CCNC: 17 U/L (ref 5–33)
ANION GAP SERPL CALCULATED.3IONS-SCNC: 16 MMOL/L (ref 9–17)
ANTIBODY SCREEN: NEGATIVE
ARM BAND NUMBER: NORMAL
AST SERPL-CCNC: 14 U/L
BASOPHILS # BLD: 1 % (ref 0–2)
BASOPHILS ABSOLUTE: 0.07 K/UL (ref 0–0.2)
BILIRUB SERPL-MCNC: 0.19 MG/DL (ref 0.3–1.2)
BUN BLDV-MCNC: 32 MG/DL (ref 8–23)
BUN/CREAT BLD: ABNORMAL (ref 9–20)
CALCIUM SERPL-MCNC: 9.6 MG/DL (ref 8.6–10.4)
CHLORIDE BLD-SCNC: 104 MMOL/L (ref 98–107)
CO2: 19 MMOL/L (ref 20–31)
CREAT SERPL-MCNC: 1 MG/DL (ref 0.5–0.9)
DIFFERENTIAL TYPE: ABNORMAL
EKG ATRIAL RATE: 108 BPM
EKG P AXIS: 38 DEGREES
EKG P-R INTERVAL: 138 MS
EKG Q-T INTERVAL: 338 MS
EKG QRS DURATION: 64 MS
EKG QTC CALCULATION (BAZETT): 452 MS
EKG R AXIS: 62 DEGREES
EKG T AXIS: 25 DEGREES
EKG VENTRICULAR RATE: 108 BPM
EOSINOPHILS RELATIVE PERCENT: 2 % (ref 1–4)
EXPIRATION DATE: NORMAL
FERRITIN: 65 UG/L (ref 13–150)
GFR AFRICAN AMERICAN: >60 ML/MIN
GFR NON-AFRICAN AMERICAN: 54 ML/MIN
GFR SERPL CREATININE-BSD FRML MDRD: ABNORMAL ML/MIN/{1.73_M2}
GFR SERPL CREATININE-BSD FRML MDRD: ABNORMAL ML/MIN/{1.73_M2}
GLUCOSE BLD-MCNC: 142 MG/DL (ref 70–99)
HCT VFR BLD CALC: 37 % (ref 36.3–47.1)
HCT VFR BLD CALC: 41.9 % (ref 36.3–47.1)
HCT VFR BLD CALC: 42.7 % (ref 36.3–47.1)
HEMOGLOBIN: 11.6 G/DL (ref 11.9–15.1)
HEMOGLOBIN: 12.8 G/DL (ref 11.9–15.1)
HEMOGLOBIN: 12.9 G/DL (ref 11.9–15.1)
IMMATURE GRANULOCYTES: 3 %
INR BLD: 1
LACTIC ACID, WHOLE BLOOD: 2.3 MMOL/L (ref 0.7–2.1)
LIPASE: 15 U/L (ref 13–60)
LYMPHOCYTES # BLD: 11 % (ref 24–43)
MCH RBC QN AUTO: 26.6 PG (ref 25.2–33.5)
MCH RBC QN AUTO: 27.1 PG (ref 25.2–33.5)
MCHC RBC AUTO-ENTMCNC: 30.2 G/DL (ref 28.4–34.8)
MCHC RBC AUTO-ENTMCNC: 30.5 G/DL (ref 28.4–34.8)
MCV RBC AUTO: 88 FL (ref 82.6–102.9)
MCV RBC AUTO: 88.8 FL (ref 82.6–102.9)
MONOCYTES # BLD: 5 % (ref 3–12)
NRBC AUTOMATED: 0 PER 100 WBC
NRBC AUTOMATED: 0 PER 100 WBC
PARTIAL THROMBOPLASTIN TIME: 24.7 SEC (ref 20.5–30.5)
PDW BLD-RTO: 15.9 % (ref 11.8–14.4)
PDW BLD-RTO: 15.9 % (ref 11.8–14.4)
PLATELET # BLD: 247 K/UL (ref 138–453)
PLATELET # BLD: 257 K/UL (ref 138–453)
PLATELET ESTIMATE: ABNORMAL
PMV BLD AUTO: 10.3 FL (ref 8.1–13.5)
PMV BLD AUTO: 10.8 FL (ref 8.1–13.5)
POTASSIUM SERPL-SCNC: 5 MMOL/L (ref 3.7–5.3)
PROTHROMBIN TIME: 10.1 SEC (ref 9–12)
RBC # BLD: 4.72 M/UL (ref 3.95–5.11)
RBC # BLD: 4.85 M/UL (ref 3.95–5.11)
RBC # BLD: ABNORMAL 10*6/UL
SARS-COV-2, RAPID: NOT DETECTED
SARS-COV-2: NORMAL
SARS-COV-2: NORMAL
SEG NEUTROPHILS: 78 % (ref 36–65)
SEGMENTED NEUTROPHILS ABSOLUTE COUNT: 10.23 K/UL (ref 1.5–8.1)
SODIUM BLD-SCNC: 139 MMOL/L (ref 135–144)
SOURCE: NORMAL
TOTAL PROTEIN: 6.7 G/DL (ref 6.4–8.3)
TROPONIN INTERP: ABNORMAL
TROPONIN INTERP: ABNORMAL
TROPONIN T: ABNORMAL NG/ML
TROPONIN T: ABNORMAL NG/ML
TROPONIN, HIGH SENSITIVITY: 41 NG/L (ref 0–14)
TROPONIN, HIGH SENSITIVITY: 59 NG/L (ref 0–14)
WBC # BLD: 11.4 K/UL (ref 3.5–11.3)
WBC # BLD: 13.1 K/UL (ref 3.5–11.3)
WBC # BLD: ABNORMAL 10*3/UL

## 2020-11-07 PROCEDURE — 85730 THROMBOPLASTIN TIME PARTIAL: CPT

## 2020-11-07 PROCEDURE — 6360000002 HC RX W HCPCS: Performed by: INTERNAL MEDICINE

## 2020-11-07 PROCEDURE — 6370000000 HC RX 637 (ALT 250 FOR IP): Performed by: INTERNAL MEDICINE

## 2020-11-07 PROCEDURE — 2580000003 HC RX 258: Performed by: INTERNAL MEDICINE

## 2020-11-07 PROCEDURE — 6370000000 HC RX 637 (ALT 250 FOR IP): Performed by: NURSE PRACTITIONER

## 2020-11-07 PROCEDURE — 83690 ASSAY OF LIPASE: CPT

## 2020-11-07 PROCEDURE — C9113 INJ PANTOPRAZOLE SODIUM, VIA: HCPCS | Performed by: EMERGENCY MEDICINE

## 2020-11-07 PROCEDURE — 85610 PROTHROMBIN TIME: CPT

## 2020-11-07 PROCEDURE — 82728 ASSAY OF FERRITIN: CPT

## 2020-11-07 PROCEDURE — 85018 HEMOGLOBIN: CPT

## 2020-11-07 PROCEDURE — 6370000000 HC RX 637 (ALT 250 FOR IP): Performed by: STUDENT IN AN ORGANIZED HEALTH CARE EDUCATION/TRAINING PROGRAM

## 2020-11-07 PROCEDURE — 6360000002 HC RX W HCPCS: Performed by: NURSE PRACTITIONER

## 2020-11-07 PROCEDURE — 99283 EMERGENCY DEPT VISIT LOW MDM: CPT

## 2020-11-07 PROCEDURE — U0002 COVID-19 LAB TEST NON-CDC: HCPCS

## 2020-11-07 PROCEDURE — 74177 CT ABD & PELVIS W/CONTRAST: CPT

## 2020-11-07 PROCEDURE — 84484 ASSAY OF TROPONIN QUANT: CPT

## 2020-11-07 PROCEDURE — 83605 ASSAY OF LACTIC ACID: CPT

## 2020-11-07 PROCEDURE — 86900 BLOOD TYPING SEROLOGIC ABO: CPT

## 2020-11-07 PROCEDURE — 6360000004 HC RX CONTRAST MEDICATION: Performed by: EMERGENCY MEDICINE

## 2020-11-07 PROCEDURE — 85027 COMPLETE CBC AUTOMATED: CPT

## 2020-11-07 PROCEDURE — 93005 ELECTROCARDIOGRAM TRACING: CPT | Performed by: EMERGENCY MEDICINE

## 2020-11-07 PROCEDURE — 71045 X-RAY EXAM CHEST 1 VIEW: CPT

## 2020-11-07 PROCEDURE — 80053 COMPREHEN METABOLIC PANEL: CPT

## 2020-11-07 PROCEDURE — 99221 1ST HOSP IP/OBS SF/LOW 40: CPT | Performed by: NURSE PRACTITIONER

## 2020-11-07 PROCEDURE — 85025 COMPLETE CBC W/AUTO DIFF WBC: CPT

## 2020-11-07 PROCEDURE — 2500000003 HC RX 250 WO HCPCS: Performed by: EMERGENCY MEDICINE

## 2020-11-07 PROCEDURE — 85014 HEMATOCRIT: CPT

## 2020-11-07 PROCEDURE — 2580000003 HC RX 258: Performed by: NURSE PRACTITIONER

## 2020-11-07 PROCEDURE — 86850 RBC ANTIBODY SCREEN: CPT

## 2020-11-07 PROCEDURE — 86901 BLOOD TYPING SEROLOGIC RH(D): CPT

## 2020-11-07 PROCEDURE — 96375 TX/PRO/DX INJ NEW DRUG ADDON: CPT

## 2020-11-07 PROCEDURE — 99222 1ST HOSP IP/OBS MODERATE 55: CPT | Performed by: INTERNAL MEDICINE

## 2020-11-07 PROCEDURE — 93010 ELECTROCARDIOGRAM REPORT: CPT | Performed by: INTERNAL MEDICINE

## 2020-11-07 PROCEDURE — 96374 THER/PROPH/DIAG INJ IV PUSH: CPT

## 2020-11-07 PROCEDURE — 96372 THER/PROPH/DIAG INJ SC/IM: CPT

## 2020-11-07 PROCEDURE — 2060000000 HC ICU INTERMEDIATE R&B

## 2020-11-07 PROCEDURE — 6360000002 HC RX W HCPCS: Performed by: EMERGENCY MEDICINE

## 2020-11-07 PROCEDURE — C9113 INJ PANTOPRAZOLE SODIUM, VIA: HCPCS | Performed by: NURSE PRACTITIONER

## 2020-11-07 PROCEDURE — 1200000000 HC SEMI PRIVATE

## 2020-11-07 PROCEDURE — 2580000003 HC RX 258: Performed by: EMERGENCY MEDICINE

## 2020-11-07 PROCEDURE — 2580000003 HC RX 258

## 2020-11-07 RX ORDER — 0.9 % SODIUM CHLORIDE 0.9 %
1000 INTRAVENOUS SOLUTION INTRAVENOUS ONCE
Status: COMPLETED | OUTPATIENT
Start: 2020-11-07 | End: 2020-11-07

## 2020-11-07 RX ORDER — TRAZODONE HYDROCHLORIDE 100 MG/1
100 TABLET ORAL NIGHTLY
Status: DISCONTINUED | OUTPATIENT
Start: 2020-11-07 | End: 2020-11-10 | Stop reason: HOSPADM

## 2020-11-07 RX ORDER — SODIUM CHLORIDE 9 MG/ML
10 INJECTION INTRAVENOUS DAILY
Status: DISCONTINUED | OUTPATIENT
Start: 2020-11-10 | End: 2020-11-09

## 2020-11-07 RX ORDER — AMITRIPTYLINE HYDROCHLORIDE 25 MG/1
25 TABLET, FILM COATED ORAL NIGHTLY
Status: DISCONTINUED | OUTPATIENT
Start: 2020-11-07 | End: 2020-11-10 | Stop reason: HOSPADM

## 2020-11-07 RX ORDER — METOCLOPRAMIDE HYDROCHLORIDE 5 MG/ML
10 INJECTION INTRAMUSCULAR; INTRAVENOUS EVERY 6 HOURS
Status: DISCONTINUED | OUTPATIENT
Start: 2020-11-07 | End: 2020-11-10 | Stop reason: HOSPADM

## 2020-11-07 RX ORDER — ONDANSETRON 2 MG/ML
4 INJECTION INTRAMUSCULAR; INTRAVENOUS EVERY 6 HOURS PRN
Status: DISCONTINUED | OUTPATIENT
Start: 2020-11-07 | End: 2020-11-10 | Stop reason: HOSPADM

## 2020-11-07 RX ORDER — GABAPENTIN 300 MG/1
300 CAPSULE ORAL 3 TIMES DAILY
Status: DISCONTINUED | OUTPATIENT
Start: 2020-11-07 | End: 2020-11-10 | Stop reason: HOSPADM

## 2020-11-07 RX ORDER — ACETAMINOPHEN 650 MG/1
650 SUPPOSITORY RECTAL EVERY 6 HOURS PRN
Status: DISCONTINUED | OUTPATIENT
Start: 2020-11-07 | End: 2020-11-10 | Stop reason: HOSPADM

## 2020-11-07 RX ORDER — LORAZEPAM 2 MG/ML
1 INJECTION INTRAMUSCULAR EVERY 6 HOURS PRN
Status: DISCONTINUED | OUTPATIENT
Start: 2020-11-07 | End: 2020-11-10 | Stop reason: HOSPADM

## 2020-11-07 RX ORDER — SODIUM CHLORIDE 0.9 % (FLUSH) 0.9 %
10 SYRINGE (ML) INJECTION EVERY 12 HOURS SCHEDULED
Status: DISCONTINUED | OUTPATIENT
Start: 2020-11-07 | End: 2020-11-10 | Stop reason: HOSPADM

## 2020-11-07 RX ORDER — ONDANSETRON 2 MG/ML
4 INJECTION INTRAMUSCULAR; INTRAVENOUS ONCE
Status: COMPLETED | OUTPATIENT
Start: 2020-11-07 | End: 2020-11-07

## 2020-11-07 RX ORDER — PROMETHAZINE HYDROCHLORIDE 12.5 MG/1
12.5 TABLET ORAL EVERY 6 HOURS PRN
Status: DISCONTINUED | OUTPATIENT
Start: 2020-11-07 | End: 2020-11-10 | Stop reason: HOSPADM

## 2020-11-07 RX ORDER — DIPHENHYDRAMINE HYDROCHLORIDE 50 MG/ML
12.5 INJECTION INTRAMUSCULAR; INTRAVENOUS ONCE
Status: COMPLETED | OUTPATIENT
Start: 2020-11-07 | End: 2020-11-07

## 2020-11-07 RX ORDER — CALCIUM CARBONATE 200(500)MG
1 TABLET,CHEWABLE ORAL 3 TIMES DAILY PRN
Status: DISCONTINUED | OUTPATIENT
Start: 2020-11-07 | End: 2020-11-10 | Stop reason: HOSPADM

## 2020-11-07 RX ORDER — MAGNESIUM SULFATE 1 G/100ML
1 INJECTION INTRAVENOUS
Status: COMPLETED | OUTPATIENT
Start: 2020-11-07 | End: 2020-11-07

## 2020-11-07 RX ORDER — SODIUM CHLORIDE 0.9 % (FLUSH) 0.9 %
10 SYRINGE (ML) INJECTION PRN
Status: DISCONTINUED | OUTPATIENT
Start: 2020-11-07 | End: 2020-11-10 | Stop reason: HOSPADM

## 2020-11-07 RX ORDER — SODIUM CHLORIDE 450 MG/100ML
INJECTION, SOLUTION INTRAVENOUS
Status: COMPLETED
Start: 2020-11-07 | End: 2020-11-07

## 2020-11-07 RX ORDER — SODIUM CHLORIDE 9 MG/ML
INJECTION, SOLUTION INTRAVENOUS CONTINUOUS
Status: DISCONTINUED | OUTPATIENT
Start: 2020-11-07 | End: 2020-11-10 | Stop reason: HOSPADM

## 2020-11-07 RX ORDER — SODIUM CHLORIDE 450 MG/100ML
INJECTION, SOLUTION INTRAVENOUS CONTINUOUS
Status: DISCONTINUED | OUTPATIENT
Start: 2020-11-07 | End: 2020-11-09

## 2020-11-07 RX ORDER — ESCITALOPRAM OXALATE 10 MG/1
20 TABLET ORAL DAILY
Status: DISCONTINUED | OUTPATIENT
Start: 2020-11-07 | End: 2020-11-10 | Stop reason: HOSPADM

## 2020-11-07 RX ORDER — ATORVASTATIN CALCIUM 20 MG/1
20 TABLET, FILM COATED ORAL DAILY
Status: DISCONTINUED | OUTPATIENT
Start: 2020-11-07 | End: 2020-11-10 | Stop reason: HOSPADM

## 2020-11-07 RX ORDER — METOPROLOL SUCCINATE 25 MG/1
25 TABLET, EXTENDED RELEASE ORAL DAILY
Status: DISCONTINUED | OUTPATIENT
Start: 2020-11-07 | End: 2020-11-10 | Stop reason: HOSPADM

## 2020-11-07 RX ORDER — MAGNESIUM HYDROXIDE/ALUMINUM HYDROXICE/SIMETHICONE 120; 1200; 1200 MG/30ML; MG/30ML; MG/30ML
30 SUSPENSION ORAL ONCE
Status: COMPLETED | OUTPATIENT
Start: 2020-11-07 | End: 2020-11-07

## 2020-11-07 RX ORDER — SUCRALFATE 1 G/1
1 TABLET ORAL 4 TIMES DAILY
Status: DISCONTINUED | OUTPATIENT
Start: 2020-11-07 | End: 2020-11-10 | Stop reason: HOSPADM

## 2020-11-07 RX ORDER — PANTOPRAZOLE SODIUM 40 MG/10ML
40 INJECTION, POWDER, LYOPHILIZED, FOR SOLUTION INTRAVENOUS DAILY
Status: DISCONTINUED | OUTPATIENT
Start: 2020-11-10 | End: 2020-11-09

## 2020-11-07 RX ORDER — CLONAZEPAM 0.5 MG/1
0.25 TABLET ORAL 2 TIMES DAILY
Status: DISCONTINUED | OUTPATIENT
Start: 2020-11-07 | End: 2020-11-10 | Stop reason: HOSPADM

## 2020-11-07 RX ORDER — ACETAMINOPHEN 325 MG/1
650 TABLET ORAL EVERY 6 HOURS PRN
Status: DISCONTINUED | OUTPATIENT
Start: 2020-11-07 | End: 2020-11-10 | Stop reason: HOSPADM

## 2020-11-07 RX ORDER — PROMETHAZINE HYDROCHLORIDE 25 MG/ML
12.5 INJECTION, SOLUTION INTRAMUSCULAR; INTRAVENOUS EVERY 12 HOURS
Status: DISCONTINUED | OUTPATIENT
Start: 2020-11-07 | End: 2020-11-07

## 2020-11-07 RX ORDER — BISACODYL 10 MG
10 SUPPOSITORY, RECTAL RECTAL DAILY PRN
Status: DISCONTINUED | OUTPATIENT
Start: 2020-11-07 | End: 2020-11-10 | Stop reason: HOSPADM

## 2020-11-07 RX ORDER — DICYCLOMINE HYDROCHLORIDE 10 MG/ML
20 INJECTION INTRAMUSCULAR ONCE
Status: COMPLETED | OUTPATIENT
Start: 2020-11-07 | End: 2020-11-07

## 2020-11-07 RX ORDER — BUSPIRONE HYDROCHLORIDE 10 MG/1
20 TABLET ORAL 3 TIMES DAILY
Status: DISCONTINUED | OUTPATIENT
Start: 2020-11-07 | End: 2020-11-10 | Stop reason: HOSPADM

## 2020-11-07 RX ORDER — LANOLIN ALCOHOL/MO/W.PET/CERES
325 CREAM (GRAM) TOPICAL 2 TIMES DAILY
Status: DISCONTINUED | OUTPATIENT
Start: 2020-11-07 | End: 2020-11-09

## 2020-11-07 RX ORDER — QUETIAPINE FUMARATE 100 MG/1
100 TABLET, FILM COATED ORAL NIGHTLY
Status: DISCONTINUED | OUTPATIENT
Start: 2020-11-07 | End: 2020-11-10 | Stop reason: HOSPADM

## 2020-11-07 RX ADMIN — SODIUM CHLORIDE 1000 ML: 9 INJECTION, SOLUTION INTRAVENOUS at 06:49

## 2020-11-07 RX ADMIN — SUCRALFATE 1 G: 1 TABLET ORAL at 16:08

## 2020-11-07 RX ADMIN — QUETIAPINE FUMARATE 100 MG: 100 TABLET ORAL at 23:56

## 2020-11-07 RX ADMIN — SUCRALFATE 1 G: 1 TABLET ORAL at 21:54

## 2020-11-07 RX ADMIN — SODIUM CHLORIDE, PRESERVATIVE FREE 10 ML: 5 INJECTION INTRAVENOUS at 23:58

## 2020-11-07 RX ADMIN — ALUMINUM HYDROXIDE, MAGNESIUM HYDROXIDE, AND SIMETHICONE 30 ML: 200; 200; 20 SUSPENSION ORAL at 08:25

## 2020-11-07 RX ADMIN — AMITRIPTYLINE HYDROCHLORIDE 25 MG: 25 TABLET, FILM COATED ORAL at 23:56

## 2020-11-07 RX ADMIN — MAGNESIUM SULFATE HEPTAHYDRATE 1 G: 1 INJECTION, SOLUTION INTRAVENOUS at 08:27

## 2020-11-07 RX ADMIN — DICYCLOMINE HYDROCHLORIDE 20 MG: 10 INJECTION INTRAMUSCULAR at 05:14

## 2020-11-07 RX ADMIN — SODIUM CHLORIDE 80 MG: 9 INJECTION, SOLUTION INTRAVENOUS at 06:39

## 2020-11-07 RX ADMIN — SODIUM CHLORIDE: 9 INJECTION, SOLUTION INTRAVENOUS at 23:56

## 2020-11-07 RX ADMIN — PROMETHAZINE HYDROCHLORIDE 12.5 MG: 12.5 TABLET ORAL at 21:23

## 2020-11-07 RX ADMIN — PROMETHAZINE HYDROCHLORIDE 12.5 MG: 12.5 TABLET ORAL at 14:57

## 2020-11-07 RX ADMIN — ESCITALOPRAM OXALATE 20 MG: 10 TABLET ORAL at 16:08

## 2020-11-07 RX ADMIN — GABAPENTIN 300 MG: 300 CAPSULE ORAL at 21:53

## 2020-11-07 RX ADMIN — SODIUM CHLORIDE 1 ML: 4.5 INJECTION, SOLUTION INTRAVENOUS at 13:25

## 2020-11-07 RX ADMIN — ONDANSETRON 4 MG: 2 INJECTION INTRAMUSCULAR; INTRAVENOUS at 05:05

## 2020-11-07 RX ADMIN — METOCLOPRAMIDE 10 MG: 5 INJECTION, SOLUTION INTRAMUSCULAR; INTRAVENOUS at 18:22

## 2020-11-07 RX ADMIN — CLONAZEPAM 0.25 MG: 0.5 TABLET ORAL at 21:23

## 2020-11-07 RX ADMIN — LORAZEPAM 1 MG: 2 INJECTION INTRAMUSCULAR; INTRAVENOUS at 16:09

## 2020-11-07 RX ADMIN — GABAPENTIN 300 MG: 300 CAPSULE ORAL at 16:08

## 2020-11-07 RX ADMIN — SODIUM CHLORIDE 8 MG/HR: 9 INJECTION, SOLUTION INTRAVENOUS at 06:37

## 2020-11-07 RX ADMIN — FAMOTIDINE 20 MG: 10 INJECTION, SOLUTION INTRAVENOUS at 06:19

## 2020-11-07 RX ADMIN — IOPAMIDOL 75 ML: 755 INJECTION, SOLUTION INTRAVENOUS at 06:26

## 2020-11-07 RX ADMIN — BUSPIRONE HYDROCHLORIDE 20 MG: 10 TABLET ORAL at 16:08

## 2020-11-07 RX ADMIN — CEFTRIAXONE SODIUM 1 G: 1 INJECTION, POWDER, FOR SOLUTION INTRAMUSCULAR; INTRAVENOUS at 06:50

## 2020-11-07 RX ADMIN — DIPHENHYDRAMINE HYDROCHLORIDE 12.5 MG: 50 INJECTION INTRAMUSCULAR; INTRAVENOUS at 06:21

## 2020-11-07 RX ADMIN — CLONAZEPAM 0.25 MG: 0.5 TABLET ORAL at 13:26

## 2020-11-07 RX ADMIN — METOPROLOL SUCCINATE 25 MG: 25 TABLET, FILM COATED, EXTENDED RELEASE ORAL at 16:08

## 2020-11-07 RX ADMIN — ONDANSETRON 4 MG: 2 INJECTION INTRAMUSCULAR; INTRAVENOUS at 09:03

## 2020-11-07 RX ADMIN — METOCLOPRAMIDE 10 MG: 5 INJECTION, SOLUTION INTRAMUSCULAR; INTRAVENOUS at 21:54

## 2020-11-07 RX ADMIN — SODIUM CHLORIDE: 4.5 INJECTION, SOLUTION INTRAVENOUS at 21:25

## 2020-11-07 RX ADMIN — TRAZODONE HYDROCHLORIDE 100 MG: 100 TABLET ORAL at 23:57

## 2020-11-07 RX ADMIN — MAGNESIUM SULFATE HEPTAHYDRATE 1 G: 1 INJECTION, SOLUTION INTRAVENOUS at 09:25

## 2020-11-07 RX ADMIN — BUSPIRONE HYDROCHLORIDE 20 MG: 10 TABLET ORAL at 21:53

## 2020-11-07 RX ADMIN — SODIUM CHLORIDE 8 MG/HR: 9 INJECTION, SOLUTION INTRAVENOUS at 21:54

## 2020-11-07 ASSESSMENT — ENCOUNTER SYMPTOMS
BACK PAIN: 0
BLOOD IN STOOL: 0
SHORTNESS OF BREATH: 0
ABDOMINAL PAIN: 1
VOMITING: 1
NAUSEA: 1

## 2020-11-07 NOTE — ED PROVIDER NOTES
[facility]     Emergency Department     Faculty Attestation    I performed a history and physical examination of the patient and discussed management with the resident. I have reviewed and agree with the residents findings including all diagnostic interpretations, and treatment plans as written. Any areas of disagreement are noted on the chart. I was personally present for the key portions of any procedures. I have documented in the chart those procedures where I was not present during the key portions. I have reviewed the emergency nurses triage note. I agree with the chief complaint, past medical history, past surgical history, allergies, medications, social and family history as documented unless otherwise noted below. Documentation of the HPI, Physical Exam and Medical Decision Making performed by scribchina is based on my personal performance of the HPI, PE and MDM. For Physician Assistant/ Nurse Practitioner cases/documentation I have personally evaluated this patient and have completed at least one if not all key elements of the E/M (history, physical exam, and MDM). Additional findings are as noted. 77 yo F pt c//o coffee ground emesis, no fever, no cp, pt c/o epigastric discomfort, no injury, no syncope,   pe hr 108, pale appearing, gcs 15, neck supple, abdomen mild epigastric tenderness, no distension, no rigidity,   2 + calf swelling, no calf tenderness,     Coffee ground emesis, protonix started, hgb 12, cr 1, admit  EKG Interpretation    Interpreted by me  As tachycardia, heart rate 108, no ischemia, normal axis, QT corrected 452    CRITICAL CARE: There was a high probability of clinically significant/life threatening deterioration in this patient's condition which required my urgent intervention. Total critical care time was 10 minutes. This excludes any time for separately reportable procedures.        Giovanni [address], DO  11/07/20 [address] [employer], DO  11/07/20 0325

## 2020-11-07 NOTE — ED NOTES
Blood sent to lab by writer. Abbi Bhat, RT at bedside for COVID swab.      Melissa Shah RN  11/07/20 5618

## 2020-11-07 NOTE — ED PROVIDER NOTES
[facility] ED  Emergency Department  Emergency Medicine Resident Sign-out     Care of Lily Graham was assumed from Dr. Stiven Pemberton and is being seen for GI Bleeding (woke up having coffee ground emesis around 0100)   . The patient's initial evaluation and plan have been discussed with the prior provider who initially evaluated the patient. EMERGENCY DEPARTMENT COURSE / MEDICAL DECISION MAKING:       MEDICATIONS GIVEN:  Orders Placed This Encounter   Medications    pantoprazole (PROTONIX) 80 mg in sodium chloride 0.9 % 50 mL bolus    pantoprazole (PROTONIX) 80 mg in sodium chloride 0.9 % 100 mL infusion    ondansetron (ZOFRAN) injection 4 mg    dicyclomine (BENTYL) injection 20 mg    cefTRIAXone (ROCEPHIN) 1 g IVPB in 50 mL D5W minibag     Order Specific Question:   Antimicrobial Indications     Answer:    Other     Order Specific Question:   Other Abx Indication     Answer:   UGIB    iopamidol (ISOVUE-370) 76 % injection 75 mL    famotidine (PEPCID) injection 20 mg    diphenhydrAMINE (BENADRYL) injection 12.5 mg    0.9 % sodium chloride bolus    OR Linked Order Group     promethazine (PHENERGAN) tablet 12.5 mg     ondansetron (ZOFRAN) injection 4 mg    pantoprazole (PROTONIX) 80 mg in sodium chloride 0.9 % 50 mL bolus    pantoprazole (PROTONIX) 80 mg in sodium chloride 0.9 % 100 mL infusion    AND Linked Order Group     pantoprazole (PROTONIX) injection 40 mg     sodium chloride (PF) 0.9 % injection 10 mL    magnesium sulfate 1 g in dextrose 5% 100 mL IVPB    bisacodyl (DULCOLAX) suppository 10 mg    aluminum & magnesium hydroxide-simethicone (MAALOX) 200-200-20 MG/5ML suspension 30 mL       LABS / RADIOLOGY:     Labs Reviewed   CBC WITH AUTO DIFFERENTIAL - Abnormal; Notable for the following components:       Result Value    WBC 13.1 (*)     RDW 15.9 (*)     Seg Neutrophils 78 (*)     Lymphocytes 11 (*)     Immature Granulocytes 3 (*)     Segs Absolute 10.23 (*) Absolute Immature Granulocyte 0.42 (*)     All other components within normal limits   COMPREHENSIVE METABOLIC PANEL - Abnormal; Notable for the following components:    Glucose 142 (*)     BUN 32 (*)     CREATININE 1.00 (*)     CO2 19 (*)     Alkaline Phosphatase 123 (*)     Total Bilirubin 0.19 (*)     Alb 3.4 (*)     GFR Non- 54 (*)     All other components within normal limits   TROPONIN - Abnormal; Notable for the following components:    Troponin, High Sensitivity 59 (*)     All other components within normal limits   LACTIC ACID, WHOLE BLOOD - Abnormal; Notable for the following components:    Lactic Acid, Whole Blood 2.3 (*)     All other components within normal limits   TROPONIN - Abnormal; Notable for the following components:    Troponin, High Sensitivity 41 (*)     All other components within normal limits   LIPASE   PROTIME-INR   APTT   HEMOGLOBIN AND HEMATOCRIT, BLOOD   HEMOGLOBIN AND HEMATOCRIT, BLOOD   CBC   COVID-19   TYPE AND SCREEN       CT ABDOMEN PELVIS W IV CONTRAST   Final Result   *Wall thickening involving the distal esophagus and moderate size hiatal   hernia which are fluid-filled suggesting esophagitis/gastritis etiology of   which may be infectious or inflammatory versus less likely neoplastic. Adjacent subcentimeter distal paraesophageal lymph node which may be reactive   in nature. Recommend GI consultation and consider further assessment with   endoscopy as clinically warranted. *Numerous incidental and chronic/postsurgical findings as detailed above. XR CHEST PORTABLE   Final Result   Cardiomegaly and pulmonary vascular congestion. No focal consolidation. Follow-up PA and lateral chest radiographs may be helpful for further   evaluation as warranted. RECENT VITALS:     Temp: 98.4 °F (36.9 °C),  Pulse: 111, Resp: 11, BP: 99/72, SpO2: 94 %    This patient is a 76 y.o. Female with gi bleed admitted. H/H stable mag for qtc so as to give additional antiemetics. OUTSTANDING TASKS / RECOMMENDATIONS:      1. Await Bed placement  2. FINAL IMPRESSION:     1. UGIB (upper gastrointestinal bleed)        DISPOSITION:       DISPOSITION:  []  Discharge   []  Transfer -    [x]  Admission -     []  Against Medical Advice   []  Eloped   FOLLOW-UP: No follow-up provider specified.    DISCHARGE MEDICATIONS: New Prescriptions    No medications on file          Angela Whitten DO  Emergency Medicine Resident  [address] [place]  Resident  11/07/20 1181

## 2020-11-07 NOTE — H&P
[facility]  Office: [address], DO, Damaristristin Minor DO, Isadoraorly Alan, DO, Kandi Cliffordl Blood, DO, Marlen Hogan MD, Ryan Dial MD, Darryle So, MD, Emilee Emerson MD, Tyree Travis MD, Johann Miller MD, Theodore Moore MD, Sincere Harper MD, Kvng Gudino MD, Rebel Ocampo DO, Geremias Chou MD, Sindhu Putnam MD, Montez Lugo DO, Magalis Atkins MD,  Tuyet Mcbride DO, Dominguez Weaver MD, Berna Ashley MD, Patience Lombardo, [facility], [facility], CNP, Rashi Osorio, CNP, Eric Brooke, CNS, Alan De La Fuente, CNP, Trey Jackson, CNP, Chetan Call, CNP, Gerald Schofield, CNP, Mitchel Denise, CNP, Candace Arechiga PA-C, Ben Villasenor, IVY, Daryn Mckeon, CNP, Leo Roberts, CNP, Daniel Esparza, CNP, Tawana Rubinstein, CNP, Yahir Mcfarland, CNP         [facility]    HISTORY AND PHYSICAL EXAMINATION            Date:   11/7/2020  Patient name:  Vito Ocampo  Date of admission:  11/7/2020  4:32 AM  MRN:   [id number]  Account:  [de-identified]  YOB: 1945  PCP:    Jeff Monge MD  Room:   14/14  Code Status:    Prior    Chief Complaint:     Chief Complaint   Patient presents with    GI Bleeding     woke up having coffee ground emesis around 0100     History Obtained From:     patient    History of Present Illness:     Vito Ocampo is a 76 y.o. Non-/non  female who presents with GI Bleeding (woke up having coffee ground emesis around 0100)   and is admitted to the hospital for the management of Acute upper GI bleed. Patient states she woke up last night with acute episode of coffee-ground emesis which has been worsening throughout the day. Patient has had 4-5 episodes before presenting in the emergency department. Patient states he has a history of upper GI bleed previously seen on July 2020. Patient also has a history of robotic fundoplication on 1/16/6830.   Patient underwent EGD and found to have severe performed by Reji Vazquez MD at [facility] N/A 3/27/2020    EGD PEG TUBE PLACEMENT performed by Reji Vazquez MD at [address] CATH POWER PICC TRIPLE  3/4/2020         HYSTERECTOMY      Abdominal    JOINT REPLACEMENT Right     right hip    OVARY REMOVAL      RHINOPLASTY      TONSILLECTOMY      TOTAL HIP ARTHROPLASTY      TOTAL NEPHRECTOMY      atrophic from infections, age 13   [de-identified] TRACHEOSTOMY N/A 3/27/2020    **ADD ON **WANTS 10:00AM **TRACHEOTOMY performed by Reji Vazquez MD at [address] N/A 4/2/2020    TRACHEOTOMY EXCHANGE performed by Reji Vazquez MD at [address] [facility] 3/17/2020    BEDSIDE EGD ESOPHAGOGASTRODUODENOSCOPY (ICU) performed by Reji Vazquez MD at [address] [place] [address] N/A 5/18/2020    EGD BIOPSY performed by Jama Bautista MD at [address] [place] [address]  5/18/2020    EGD DILATION BALLOON performed by Jama Bautista MD at [address] [place] [address] N/A 7/6/2020    ** CASE IN O.R. WITH GI STAFF** EGD ESOPHAGOGASTRODUODENOSCOPY performed by Maryana Weinberg MD at [facility]        Medications Prior to Admission:     Prior to Admission medications    Medication Sig Start Date End Date Taking? Authorizing Provider   clonazePAM (KLONOPIN) 0.5 MG tablet Take 0.5 tablets by mouth 2 times daily for 60 days.  Taking 1/2 tablet BID 7/7/20 11/7/20 Yes Dipti Malone MD   pantoprazole (PROTONIX) 40 MG tablet Take 1 tablet by mouth 2 times daily 7/7/20  Yes Dipti Malone MD   ferrous sulfate (IRON 325) 325 (65 Fe) MG tablet Take 1 tablet by mouth 2 times daily 7/7/20  Yes Dipti Malone MD   amitriptyline (ELAVIL) 25 MG tablet Take 1 tablet by mouth nightly 7/14/20  Yes Dipti Malone MD   busPIRone (BUSPAR) 10 MG tablet Take 2 tablets by mouth 3 times daily 7/14/20  Yes Dipti Malone MD escitalopram (LEXAPRO) 20 MG tablet Take 1 tablet by mouth daily 7/14/20  Yes Myke Lobo MD   midodrine (PROAMATINE) 5 MG tablet Take 1 tablet by mouth 3 times daily as needed (if SBP < 100) 6/1/20  Yes Maria De Jesus Walter MD   metoprolol succinate (TOPROL XL) 25 MG extended release tablet Take 1 tablet by mouth daily 5/30/20  Yes Maria De Jesus Walter MD   QUEtiapine (SEROQUEL) 100 MG tablet Take 1 tablet by mouth nightly 4/6/20  Yes Lianet Ley MD   furosemide (LASIX) 20 MG tablet Take 1 tablet by mouth daily 4/6/20  Yes Lianet Ley MD   docusate (COLACE) 50 MG/5ML liquid 10 mLs by Per G Tube route 2 times daily 4/6/20  Yes Lianet Ley MD   metoclopramide (REGLAN) 5 MG/ML injection Infuse 2 mLs intravenously every 6 hours 4/6/20  Yes Lianet Ley MD   sucralfate (CARAFATE) 1 GM tablet Take 1 tablet by mouth 4 times daily 4/6/20  Yes Lianet Ley MD   traZODone (DESYREL) 100 MG tablet Take 1 tablet by mouth nightly 4/6/20  Yes Lianet Ley MD RA VITAMIN D-3 50 MCG (2000 UT) CAPS take 1 capsule by mouth once daily 2/14/20  Yes Historical Provider, MD   Oyster Shell 500 MG TABS Take 500 mg by mouth 2 times daily    Yes Historical Provider, MD   gabapentin (NEURONTIN) 300 MG capsule Take 300 mg by mouth 3 times daily. Yes Historical Provider, MD   simvastatin (ZOCOR) 40 MG tablet Take 40 mg by mouth nightly. Yes Historical Provider, MD   Acetaminophen 500 MG CAPS Take 1 tablet by mouth as needed for Pain (follow OTC instructions)    Historical Provider, MD   calcium carbonate (TUMS) 500 MG chewable tablet Take 1 tablet by mouth 3 times daily as needed for Heartburn    Historical Provider, MD        Allergies:     Prednisone; Compazine [prochlorperazine maleate]; Penicillin v potassium; and Penicillins    Social History:     Tobacco:    reports that she has quit smoking. Her smoking use included cigarettes. She has a 50.00 pack-year smoking history.  She has never used smokeless tobacco.  Alcohol:      reports no history of alcohol use. Drug Use:  reports no history of drug use. Family History:     Family History   Problem Relation Age of Onset    Cancer Mother         uterine    Heart Attack Mother     Heart Attack Father     Other Maternal Grandfather         foot gangrene    Other Paternal Grandmother         bleeding ulcers    Other Paternal Grandfather         lung cancer       Review of Systems:     Positive and Negative as described in HPI. CONSTITUTIONAL:  negative for fevers, chills, sweats, fatigue, weight loss  HEENT:  negative for vision, hearing changes, runny nose, throat pain  RESPIRATORY:  negative for shortness of breath, cough, congestion, wheezing  CARDIOVASCULAR:  negative for chest pain, palpitations  GASTROINTESTINAL:  +hematemesis, nausea, vomiting  GENITOURINARY:  negative for difficulty of urination, burning with urination, frequency   INTEGUMENT:  negative for rash, skin lesions, easy bruising   HEMATOLOGIC/LYMPHATIC:  negative for swelling/edema   ALLERGIC/IMMUNOLOGIC:  negative for urticaria , itching  ENDOCRINE:  negative increase in drinking, increase in urination, hot or cold intolerance  MUSCULOSKELETAL:  negative joint pains, muscle aches, swelling of joints  NEUROLOGICAL:  negative for headaches, dizziness, lightheadedness, numbness, pain, tingling extremities  BEHAVIOR/PSYCH:  +anxious    Physical Exam:   /74   Pulse 97   Temp 98.4 °F (36.9 °C) (Oral)   Resp 14   Ht 5' 2\" (1.575 m)   Wt 180 lb (81.6 kg)   SpO2 91%   BMI 32.92 kg/m²   Temp (24hrs), Av.4 °F (36.9 °C), Min:98.4 °F (36.9 °C), Max:98.4 °F (36.9 °C)    No results for input(s): POCGLU in the last 72 hours.     Intake/Output Summary (Last 24 hours) at 2020 1547  Last data filed at 2020 0904  Gross per 24 hour   Intake --   Output 1200 ml   Net -1200 ml       General Appearance: alert, well appearing, and in no acute distress  Mental status: oriented to person, place, and time  Head: normocephalic, atraumatic  Eye: no icterus, redness, pupils equal and reactive, extraocular eye movements intact, conjunctiva clear  Ear: normal external ear, no discharge, hearing intact  Nose: no drainage noted  Mouth: mucous membranes moist  Neck: supple, no carotid bruits, thyroid not palpable  Lungs: Bilateral equal air entry, clear to ausculation, no wheezing, rales or rhonchi, normal effort  Cardiovascular: normal rate, regular rhythm, no murmur, gallop, rub  Abdomen: epigastric tenderness, no guarding, no rebound tenderness  Neurologic: There are no new focal motor or sensory deficits, normal muscle tone and bulk, no abnormal sensation, normal speech, cranial nerves II through XII grossly intact  Skin: No gross lesions, rashes, bruising or bleeding on exposed skin area  Extremities: peripheral pulses palpable, no pedal edema or calf pain with palpation  Psych: normal affect    Investigations:      Laboratory Testing:  Recent Results (from the past 24 hour(s))   CBC WITH AUTO DIFFERENTIAL    Collection Time: 11/07/20  4:41 AM   Result Value Ref Range    WBC 13.1 (H) 3.5 - 11.3 k/uL    RBC 4.72 3.95 - 5.11 m/uL    Hemoglobin 12.8 11.9 - 15.1 g/dL    Hematocrit 41.9 36.3 - 47.1 %    MCV 88.8 82.6 - 102.9 fL    MCH 27.1 25.2 - 33.5 pg    MCHC 30.5 28.4 - 34.8 g/dL    RDW 15.9 (H) 11.8 - 14.4 %    Platelets 678 676 - 416 k/uL    MPV 10.3 8.1 - 13.5 fL    NRBC Automated 0.0 0.0 per 100 WBC    Differential Type NOT REPORTED     Seg Neutrophils 78 (H) 36 - 65 %    Lymphocytes 11 (L) 24 - 43 %    Monocytes 5 3 - 12 %    Eosinophils % 2 1 - 4 %    Basophils 1 0 - 2 %    Immature Granulocytes 3 (H) 0 %    Segs Absolute 10.23 (H) 1.50 - 8.10 k/uL    Absolute Lymph # 1.48 1.10 - 3.70 k/uL    Absolute Mono # 0.66 0.10 - 1.20 k/uL    Absolute Eos # 0.20 0.00 - 0.44 k/uL    Basophils Absolute 0.07 0.00 - 0.20 k/uL    Absolute Immature Granulocyte 0.42 (H) 0.00 - 0.30 k/uL WBC Morphology NOT REPORTED     RBC Morphology ANISOCYTOSIS PRESENT     Platelet Estimate NOT REPORTED    COMPREHENSIVE METABOLIC PANEL    Collection Time: 11/07/20  4:41 AM   Result Value Ref Range    Glucose 142 (H) 70 - 99 mg/dL    BUN 32 (H) 8 - 23 mg/dL    CREATININE 1.00 (H) 0.50 - 0.90 mg/dL    Bun/Cre Ratio NOT REPORTED 9 - 20    Calcium 9.6 8.6 - 10.4 mg/dL    Sodium 139 135 - 144 mmol/L    Potassium 5.0 3.7 - 5.3 mmol/L    Chloride 104 98 - 107 mmol/L    CO2 19 (L) 20 - 31 mmol/L    Anion Gap 16 9 - 17 mmol/L    Alkaline Phosphatase 123 (H) 35 - 104 U/L    ALT 17 5 - 33 U/L    AST 14 <32 U/L    Total Bilirubin 0.19 (L) 0.3 - 1.2 mg/dL    Total Protein 6.7 6.4 - 8.3 g/dL    Alb 3.4 (L) 3.5 - 5.2 g/dL    Albumin/Globulin Ratio 1.0 1.0 - 2.5    GFR Non- 54 (L) >60 mL/min    GFR African American >60 >60 mL/min    GFR Comment          GFR Staging NOT REPORTED    LIPASE    Collection Time: 11/07/20  4:41 AM   Result Value Ref Range    Lipase 15 13 - 60 U/L   Troponin    Collection Time: 11/07/20  4:41 AM   Result Value Ref Range    Troponin, High Sensitivity 59 (HH) 0 - 14 ng/L    Troponin T NOT REPORTED <0.03 ng/mL    Troponin Interp NOT REPORTED    EKG 12 Lead    Collection Time: 11/07/20  4:41 AM   Result Value Ref Range    Ventricular Rate 108 BPM    Atrial Rate 108 BPM    P-R Interval 138 ms    QRS Duration 64 ms    Q-T Interval 338 ms    QTc Calculation (Bazett) 452 ms    P Axis 38 degrees    R Axis 62 degrees    T Axis 25 degrees   PROTIME-INR    Collection Time: 11/07/20  4:41 AM   Result Value Ref Range    Protime 10.1 9.0 - 12.0 sec    INR 1.0    APTT    Collection Time: 11/07/20  4:41 AM   Result Value Ref Range    PTT 24.7 20.5 - 30.5 sec   TYPE AND SCREEN    Collection Time: 11/07/20  4:44 AM   Result Value Ref Range    Expiration Date 11/10/2020,2359     Arm Band Number BE [id number]     ABO/Rh O POSITIVE     Antibody Screen NEGATIVE    Troponin    Collection Time: 11/07/20  6:40 AM Result Value Ref Range    Troponin, High Sensitivity 41 (H) 0 - 14 ng/L    Troponin T NOT REPORTED <0.03 ng/mL    Troponin Interp NOT REPORTED    LACTIC ACID, WHOLE BLOOD    Collection Time: 11/07/20  6:43 AM   Result Value Ref Range    Lactic Acid, Whole Blood 2.3 (H) 0.7 - 2.1 mmol/L   CBC    Collection Time: 11/07/20  9:37 AM   Result Value Ref Range    WBC 11.4 (H) 3.5 - 11.3 k/uL    RBC 4.85 3.95 - 5.11 m/uL    Hemoglobin 12.9 11.9 - 15.1 g/dL    Hematocrit 42.7 36.3 - 47.1 %    MCV 88.0 82.6 - 102.9 fL    MCH 26.6 25.2 - 33.5 pg    MCHC 30.2 28.4 - 34.8 g/dL    RDW 15.9 (H) 11.8 - 14.4 %    Platelets 421 138 - 551 k/uL    MPV 10.8 8.1 - 13.5 fL    NRBC Automated 0.0 0.0 per 100 WBC   COVID-19    Collection Time: 11/07/20  9:52 AM    Specimen: Other   Result Value Ref Range    SARS-CoV-2          SARS-CoV-2, Rapid Not Detected Not Detected    Source . NASOPHARYNGEAL SWAB     SARS-CoV-2             Imaging/Diagnostics:  Ct Abdomen Pelvis W Iv Contrast    Result Date: 11/7/2020  *Wall thickening involving the distal esophagus and moderate size hiatal hernia which are fluid-filled suggesting esophagitis/gastritis etiology of which may be infectious or inflammatory versus less likely neoplastic. Adjacent subcentimeter distal paraesophageal lymph node which may be reactive in nature. Recommend GI consultation and consider further assessment with endoscopy as clinically warranted. *Numerous incidental and chronic/postsurgical findings as detailed above. Xr Chest Portable    Result Date: 11/7/2020  Cardiomegaly and pulmonary vascular congestion. No focal consolidation. Follow-up PA and lateral chest radiographs may be helpful for further evaluation as warranted.        Assessment :      Hospital Problems           Last Modified POA    * (Principal) Acute upper GI bleed 11/7/2020 Yes    Depression 11/7/2020 Yes    Anxiety 11/7/2020 Yes    Urine retention 11/7/2020 Yes    Acute on chronic blood loss anemia 11/7/2020 Yes    H/O gastric ulcer 11/7/2020 Yes    Hyperlipidemia 11/7/2020 Yes    Hypertension 11/7/2020 Yes    Tobacco abuse 11/7/2020 Yes    S/P Nissen fundoplication (without gastrostomy tube) procedure 11/7/2020 Yes                Plan:     Patient status inpatient in the Progressive Unit/Step down    1. Hematemesis -likely upper GI bleed as patient is having coffee-ground emesis and has a recent history of robotic gastric fundoplication on 1/21/7723. Patient was seen on 7/7/2020 for upper GI bleed as well. Patient was started on twice daily PPI, every 6 hour H&H, GI was consulted and will continue to monitor for now. Patient may undergo a EGD tomorrow if further bleeding is noted. Hemoglobin appears to be stable at this time  2. BENEDICT -likely secondary to decreased intravascular volume, baseline of 0.5 creatinine, continue half-normal saline IV fluids. Hemoglobin is currently 12.9 which is suspected to be concentrated. 3. Elevated troponin-likely type II mechanism continue to monitor, no EKG changes  4. Anxiety-patient takes Elavil, BuSpar, Lexapro for anxiety, as needed Ativan added  5. Intractable nausea and vomiting-likely worsened by #4  6. Gastroparesis-patient is on Reglan 10 mg every 6 hours at home  7. Hyperlipidemia  8. Hypertension  9. History of robotic gastric fundoplication    Consultations:   IP CONSULT TO HOSPITALIST  IP CONSULT TO GI     Patient is admitted as inpatient status because of co-morbidities listed above, severity of signs and symptoms as outlined, requirement for current medical therapies and most importantly because of direct risk to patient if care not provided in a hospital setting. Expected length of stay > 48 hours.     Néstor Thakkar DO  11/7/2020  3:47 PM    Copy sent to Dr. Nikolay Bocanegra MD

## 2020-11-07 NOTE — ED PROVIDER NOTES
[address]  Emergency Department Encounter  Emergency Medicine Resident     Pt Name: Shan Wilcox  MRN: [id number]  [place] 1945  Date of evaluation: 11/7/20  PCP:  Jill Tomlinson MD    [address] [place] [address]       Chief Complaint   Patient presents with    GI Bleeding     woke up having coffee ground emesis around 0100       HISTORY OF PRESENT ILLNESS  (Location/Symptom, Timing/Onset, Context/Setting, Quality, Duration, Modifying Factors, Severity.)    Shan Wilcox is a 76 y.o. female who presents to the emergency department via EMS with multiple episodes of coffee-ground like emesis starting around 1 AM.  Patient has had multiple GI bleeds in the past due to ulcers. Patient is currently on a acid inhibitor as well as Carafate at home. She states that she is felt extremely nauseated as well as had crampy abdominal pain as well as chest pain. Denies any lightheadedness or dizziness. Denies any shortness of breath, back pain, numbness or tingling. Denies any syncope. She is not currently on any antiplatelet or anticoagulation medications. Denies any dark tarry or bloody stools. Denies any habitual NSAID use. Denies any alcohol use. PAST MEDICAL / SURGICAL / SOCIAL / FAMILY HISTORY    has a past medical history of Anxiety, Arthritis, Back pain, Bronchitis, Caffeine use, Carpal tunnel syndrome, Cataracts, bilateral, Constipation, Depression, Diarrhea, GERD (gastroesophageal reflux disease), H/O gastric ulcer, Hyperlipidemia, Hypertension, Mumps, MVP (mitral valve prolapse), Recurrent UTI, Sinusitis, Tracheostomy in place [facility]), Ulcerative esophagitis, and Wellness examination. has a past surgical history that includes Hysterectomy; total nephrectomy; Tonsillectomy; Appendectomy; Cystoscopy; Ovary removal; Tubal ligation; rhinoplasty; Carpal tunnel release; Hand surgery; Total hip arthroplasty; eye surgery;  Colonoscopy; joint replacement (Right); Gastric fundoplication (N/A, 2/24/2020); hc cath power picc triple (3/4/2020); EGD (3/17/2020); Upper gastrointestinal endoscopy (N/A, 3/17/2020); tracheostomy (N/A, 3/27/2020); Gastrostomy tube placement (N/A, 3/27/2020); tracheostomy (N/A, 4/2/2020); Upper gastrointestinal endoscopy (N/A, 5/18/2020); Upper gastrointestinal endoscopy (5/18/2020); ERCP (05/21/2020); Cholecystectomy, laparoscopic (05/22/2020); ERCP (5/21/2020); ERCP (5/21/2020); ERCP (5/21/2020); Cholecystectomy, laparoscopic (N/A, 5/22/2020); and Upper gastrointestinal endoscopy (N/A, 7/6/2020). Social History     Socioeconomic History    Marital status:       Spouse name: Not on file    Number of children: 2    Years of education: Not on file    Highest education level: Not on file   Occupational History    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   Social Needs    Financial resource strain: Not on file    Food insecurity     Worry: Not on file     Inability: Not on file   [employer] needs     Medical: Not on file     Non-medical: Not on file   Tobacco Use    Smoking status: Former Smoker     Packs/day: 1.00     Years: 50.00     Pack years: 50.00     Types: Cigarettes    Smokeless tobacco: Never Used    Tobacco comment: quit 1 year ago    Substance and Sexual Activity    Alcohol use: No    Drug use: No    Sexual activity: Never   Lifestyle    Physical activity     Days per week: Not on file     Minutes per session: Not on file    Stress: Not on file   Relationships    Social connections     Talks on phone: Not on file     Gets together: Not on file     Attends Yazdanism service: Not on file     Active member of club or organization: Not on file     Attends meetings of clubs or organizations: Not on file     Relationship status: Not on file    Intimate partner violence     Fear of current or ex partner: Not on file     Emotionally abused: Not on file     Physically abused: Not on file     Forced sexual activity: Not on file   Other Topics Concern    Not on file Social History Narrative    Not on file       Family History   Problem Relation Age of Onset    Cancer Mother         uterine    Heart Attack Mother     Heart Attack Father     Other Maternal Grandfather         foot gangrene    Other Paternal Grandmother         bleeding ulcers    Other Paternal Grandfather         lung cancer       Allergies:    Prednisone; Compazine [prochlorperazine maleate]; Penicillin v potassium; and Penicillins    Home Medications:  Prior to Admission medications    Medication Sig Start Date End Date Taking? Authorizing Provider   clonazePAM (KLONOPIN) 0.5 MG tablet Take 0.5 tablets by mouth 2 times daily for 60 days.  Taking 1/2 tablet BID 7/7/20 9/5/20  Wojciech Prakash MD   pantoprazole (PROTONIX) 40 MG tablet Take 1 tablet by mouth 2 times daily 7/7/20   Wojciech Prakash MD   ferrous sulfate (IRON 325) 325 (65 Fe) MG tablet Take 1 tablet by mouth 2 times daily 7/7/20   Wojciech Prakash MD   amitriptyline (ELAVIL) 25 MG tablet Take 1 tablet by mouth nightly 7/14/20   Wojciech Prakash MD   busPIRone (BUSPAR) 10 MG tablet Take 2 tablets by mouth 3 times daily 7/14/20   Wojciech Prakash MD   escitalopram (LEXAPRO) 20 MG tablet Take 1 tablet by mouth daily 7/14/20   Wojciech Prakash MD   midodrine (PROAMATINE) 5 MG tablet Take 1 tablet by mouth 3 times daily as needed (if SBP < 100) 6/1/20   Chepe Godoy MD   metoprolol succinate (TOPROL XL) 25 MG extended release tablet Take 1 tablet by mouth daily 5/30/20   Chepe Godoy MD   QUEtiapine (SEROQUEL) 100 MG tablet Take 1 tablet by mouth nightly 4/6/20   Ernestine Campos MD   furosemide (LASIX) 20 MG tablet Take 1 tablet by mouth daily 4/6/20   Ernestine Campos MD   docusate (COLACE) 50 MG/5ML liquid 10 mLs by Per G Tube route 2 times daily 4/6/20   Ernestine Campos MD   metoclopramide (REGLAN) 5 MG/ML injection Infuse 2 mLs intravenously every 6 hours 4/6/20   Ernestine Campos MD   sucralfate (CARAFATE) 1 GM tablet Take 1 tablet by mouth 4 times daily 4/6/20   Tacho Leon MD   traZODone (DESYREL) 100 MG tablet Take 1 tablet by mouth nightly 4/6/20   Tacho Leon MD RA VITAMIN D-3 50 MCG (2000 UT) CAPS take 1 capsule by mouth once daily 2/14/20   Historical Provider, MD   Oyster Shell 500 MG TABS Take 500 mg by mouth 2 times daily     Historical Provider, MD   Acetaminophen 500 MG CAPS Take 1 tablet by mouth as needed for Pain (follow OTC instructions)    Historical Provider, MD   calcium carbonate (TUMS) 500 MG chewable tablet Take 1 tablet by mouth 3 times daily as needed for Heartburn    Historical Provider, MD   gabapentin (NEURONTIN) 300 MG capsule Take 300 mg by mouth 3 times daily. Historical Provider, MD   simvastatin (ZOCOR) 40 MG tablet Take 40 mg by mouth nightly. Historical Provider, MD       REVIEW OF SYSTEMS    (2-9 systems for level 4, 10 or more for level 5)    Review of Systems   Constitutional: Positive for diaphoresis. Negative for chills and fever. Respiratory: Negative for shortness of breath. Cardiovascular: Positive for chest pain. Gastrointestinal: Positive for abdominal pain, nausea and vomiting. Negative for blood in stool. Endocrine: Negative for polyuria. Genitourinary: Negative for decreased urine volume, difficulty urinating, dysuria, flank pain, hematuria and urgency. Musculoskeletal: Negative for back pain. Neurological: Negative for dizziness, syncope, weakness and light-headedness. Hematological: Does not bruise/bleed easily. Psychiatric/Behavioral: Negative for confusion. PHYSICAL EXAM   (up to 7 for level 4, 8 or more for level 5)    VITALS:   Vitals:    11/07/20 0433 11/07/20 0450   BP:  112/75   Pulse:  105   Resp:  11   Temp: 98.4 °F (36.9 °C)    TempSrc: Oral    SpO2:  94%   Weight: 180 lb (81.6 kg)    Height: 5' 2\" (1.575 m)        Physical Exam  Vitals signs and nursing note reviewed. Constitutional:       General: She is in acute distress. Appearance: She is well-developed. She is ill-appearing and diaphoretic. HENT:      Head: Normocephalic and atraumatic. Mouth/Throat:      Mouth: Mucous membranes are dry. Eyes:      Conjunctiva/sclera: Conjunctivae normal.      Pupils: Pupils are equal, round, and reactive to light. Neck:      Musculoskeletal: Normal range of motion and neck supple. Cardiovascular:      Rate and Rhythm: Regular rhythm. Tachycardia present. Heart sounds: Normal heart sounds. No murmur. Pulmonary:      Effort: Pulmonary effort is normal. No respiratory distress. Breath sounds: Normal breath sounds. No wheezing or rales. Abdominal:      General: Abdomen is protuberant. A surgical scar is present. There is no distension. Palpations: Abdomen is soft. Tenderness: There is abdominal tenderness. There is no guarding or rebound. Musculoskeletal: Normal range of motion. Right lower leg: Edema present. Left lower leg: Edema present. Skin:     General: Skin is warm. Coloration: Skin is pale. Neurological:      General: No focal deficit present. Mental Status: She is alert.    Psychiatric:         Behavior: Behavior normal.         DIFFERENTIAL  DIAGNOSIS   PLAN (LABS / IMAGING / EKG):  Orders Placed This Encounter   Procedures    CT ABDOMEN PELVIS W IV CONTRAST    XR CHEST PORTABLE    CBC WITH AUTO DIFFERENTIAL    COMPREHENSIVE METABOLIC PANEL    LIPASE    Troponin    PROTIME-INR    APTT    LACTIC ACID, WHOLE BLOOD    Troponin    Telemetry monitoring    Inpatient consult to Hospitalist    EKG 12 Lead    TYPE AND SCREEN    Insert peripheral IV    PATIENT STATUS (FROM ED OR OR/PROCEDURAL) Inpatient       MEDICATIONS ORDERED:  Orders Placed This Encounter   Medications    pantoprazole (PROTONIX) 80 mg in sodium chloride 0.9 % 50 mL bolus    pantoprazole (PROTONIX) 80 mg in sodium chloride 0.9 % 100 mL infusion    ondansetron (ZOFRAN) injection 4 mg    dicyclomine (BENTYL) injection 20 mg    cefTRIAXone (ROCEPHIN) 1 g IVPB in 50 mL D5W minibag     Order Specific Question:   Antimicrobial Indications     Answer:    Other     Order Specific Question:   Other Abx Indication     Answer:   UGIB    iopamidol (ISOVUE-370) 76 % injection 75 mL    famotidine (PEPCID) injection 20 mg    diphenhydrAMINE (BENADRYL) injection 12.5 mg    0.9 % sodium chloride bolus    magnesium sulfate 1 g in dextrose 5% 100 mL IVPB    bisacodyl (DULCOLAX) suppository 10 mg       DDX:   Upper GI bleed, lower GI bleed, small bowel obstruction, perforated viscus, mesenteric ischemia, ACS    DIAGNOSTIC RESULTS / EMERGENCYDEPARTMENT COURSE / MDM   LABS:  Labs Reviewed   CBC WITH AUTO DIFFERENTIAL - Abnormal; Notable for the following components:       Result Value    WBC 13.1 (*)     RDW 15.9 (*)     Seg Neutrophils 78 (*)     Lymphocytes 11 (*)     Immature Granulocytes 3 (*)     Segs Absolute 10.23 (*)     Absolute Immature Granulocyte 0.42 (*)     All other components within normal limits   COMPREHENSIVE METABOLIC PANEL - Abnormal; Notable for the following components:    Glucose 142 (*)     BUN 32 (*)     CREATININE 1.00 (*)     CO2 19 (*)     Alkaline Phosphatase 123 (*)     Total Bilirubin 0.19 (*)     Alb 3.4 (*)     GFR Non- 54 (*)     All other components within normal limits   TROPONIN - Abnormal; Notable for the following components:    Troponin, High Sensitivity 59 (*)     All other components within normal limits   LACTIC ACID, WHOLE BLOOD - Abnormal; Notable for the following components:    Lactic Acid, Whole Blood 2.3 (*)     All other components within normal limits   TROPONIN - Abnormal; Notable for the following components:    Troponin, High Sensitivity 41 (*)     All other components within normal limits   LIPASE   PROTIME-INR   APTT   TYPE AND SCREEN       RADIOLOGY:  Ct Abdomen Pelvis W Iv Contrast    Result Date: 11/7/2020  EXAMINATION: CT OF THE ABDOMEN AND PELVIS WITH CONTRAST 11/7/2020 5:58 am TECHNIQUE: CT of the abdomen and pelvis was performed with the administration of intravenous contrast. Multiplanar reformatted images are provided for review. Dose modulation, iterative reconstruction, and/or weight based adjustment of the mA/kV was utilized to reduce the radiation dose to as low as reasonably achievable. COMPARISON: Multiple prior examinations most recently 03/17/2020 HISTORY: Reason for Exam: abd pain, prior surgical hx, hematemesis Acuity: Acute Type of Exam: Initial FINDINGS: Lower Chest: Normal heart size. Left greater than right bibasilar subsegmental atelectasis and/or scarring. Wall thickening involving the distal esophagus and moderate size hiatal hernia which are fluid-filled. Adjacent subcentimeter distal paraesophageal lymph node. Organs: Hepatic fatty infiltration. Spleen, pancreas and right adrenal gland appear unremarkable. Stable slight nodular thickening of the left adrenal gland. Prior right nephrectomy and cholecystectomy. Common bile duct stent is in place. GI/Bowel: No evidence of bowel obstruction or inflammation. Large amount of stool in the rectosigmoid which may be correlated for constipation. G-tube is in place. Pelvis: Bladder is grossly unremarkable. Hysterectomy. Peritoneum/Retroperitoneum: Normal caliber abdominal aorta moderate to severe infrarenal atherosclerosis. No suspicious lymphadenopathy. No ascites or free air. Bones/Soft Tissues: Stable scoliosis and multilevel degenerative change of the lumbar and visualized thoracic spine. Stable right hip arthroplasty. *Wall thickening involving the distal esophagus and moderate size hiatal hernia which are fluid-filled suggesting esophagitis/gastritis etiology of which may be infectious or inflammatory versus less likely neoplastic. Adjacent subcentimeter distal paraesophageal lymph node which may be reactive in nature.   Recommend GI consultation and consider further assessment with endoscopy as clinically warranted. *Numerous incidental and chronic/postsurgical findings as detailed above. Xr Chest Portable    Result Date: 11/7/2020  EXAMINATION: ONE XRAY VIEW OF THE CHEST 11/7/2020 5:13 am COMPARISON: May 30, 2020. HISTORY: ORDERING SYSTEM PROVIDED HISTORY: cp, abd pain TECHNOLOGIST PROVIDED HISTORY: cp, abd pain Reason for Exam: portable upright/ c/o chest pain, sob and abdominal pain Acuity: Acute Type of Exam: Initial FINDINGS: Frontal portable view of the chest.  Patient rotation to the left. Normal lung volume. Prominent pulmonary vasculature. No focal consolidation. No pleural effusion or pneumothorax. Grossly stable cardiomediastinal silhouette and great vessels. Stable regional skeleton. Cardiomegaly and pulmonary vascular congestion. No focal consolidation. Follow-up PA and lateral chest radiographs may be helpful for further evaluation as warranted.        EKG    EKG Interpretation    Interpreted by emergency department physician    Rhythm: sinus tachycardia  Rate: 108  Axis: normal  Ectopy: none  Conduction: QTc 452  ST Segments: no acute change  T Waves: inversion in  III  Q Waves: III    Clinical Impression: non-specific EKG    All EKG's are interpreted by the Emergency Department Physician whoeither signs or Co-signs this chart in the absence of a cardiologist.    EMERGENCY DEPARTMENT COURSE:  ED Course as of Nov 07 0724   Sat Nov 07, 2020   0507 CBC WITH AUTO DIFFERENTIAL(!):    WBC 13.1(!)   RBC 4.72   Hemoglobin Quant 12.8   Hematocrit 41.9   MCV 88.8   MCH 27.1   MCHC 30.5   RDW 15.9(!)   Platelet Count 125   MPV 10.3   NRBC Automated 0.0   Differential Type NOT REPORTED   Seg Neutrophils 78(!)   Lymphocytes 11(!)   Monocytes 5   Eosinophils % 2   Basophils 1   Immature Granulocytes 3(!)   Segs Absolute 10.23(!)   Absolute Lymph # 1.48   Absolute Mono # 0.66   Absolute Eos # 0.20   Basophils Absolute 0.07   Absolute Immature Granulocyte. .. [AM]   2319 Troponin, High Sensitivity(!!): 59 [AM]   0549 COMPREHENSIVE METABOLIC PANEL(!):    Glucose 142(!)   BUN 32(!)   Creatinine 1.00(!)   Bun/Cre Ratio NOT REPORTED   Calcium 9.6   Sodium 139   Potassium 5.0   Chloride 104   CO2 19(!)   Anion Gap 16   Alk Phos 123(!)   ALT 17   AST 14   Bilirubin 0.19(!)   Total Protein 6.7   Albumin 3.4(!)   Albumin/Globulin Ratio 1.0   GFR Non- 54(!)   GFR  >60   GFR Comment        GFR Staging NOT REPORTED [AM]   0549 Alk Phos(!): 123 [AM]   0549 XR CHEST PORTABLE [AM]   0612 Lipase: 15 [AM]   0845 Intermed to admit    [AM]   0658 Lactic Acid, Whole Blood(!): 2.3 [AM]   4790 Troponin, High Sensitivity(!): 41 [AM]      ED Course User Index  [AM] DO CHANDLER Dillon  Number of Diagnoses or Management Options  UGIB (upper gastrointestinal bleed):   Diagnosis management comments: 70-year-old female with history of prior gastric ulcers presents emergency department multiple episodes of coffee-ground emesis. Initial evaluation patient mildly tachycardic, diaphoretic, ill-appearing, pale. Generalized abdominal tenderness with no focal area of tenderness. Old surgical scars. PEG tube in place. Cardiac and abdominal work-up obtained. Initial troponin elevated, however this is at baseline. Repeat troponin downtrending. BENEDICT noted as well as elevation in her alkaline phosphatase. Lactic acid elevated. CT of the abdomen was obtained due to abdominal tenderness, multiple abdominal surgeries, PEG tube in place, history of hematemesis. Concerning for esophagitis/gastritis. Patient given IV fluids, Rocephin, push dose as well as drip of Protonix, IV Pepcid, Zofran, Benadryl. Patient allergic to Compazine, QTC at 452 so cautious in giving any other prolonging drugs. Patient was given 2 g of magnesium to help close QTC.   No history of alcoholism or your documented history of varices and therefore octreotide was not administered. Patient admitted to [facility] under stepdown status. Amount and/or Complexity of Data Reviewed  Clinical lab tests: ordered and reviewed  Tests in the radiology section of CPT®: ordered and reviewed  Review and summarize past medical records: yes  Discuss the patient with other providers: yes  Independent visualization of images, tracings, or specimens: yes    Patient Progress  Patient progress: stable      CONSULTS:  IP CONSULT TO HOSPITALIST  IP CONSULT TO GI    CRITICAL CARE:  Please see attending note    FINAL IMPRESSION     1. UGIB (upper gastrointestinal bleed)        DISPOSITION / [id number]  Admitted 11/07/2020 06:40:31 AM    Louise [facility] [place]  Emergency Medicine Resident Physician, PGY-3    (Please note that portions of this note were completed with a voice recognition program.  Efforts were made to edit the dictations but occasionally words are mis-transcribed.)        Lucie Najera DO  Resident  11/07/20 [id number]

## 2020-11-07 NOTE — ED NOTES
Patient had emesis of 1,200mL, dark red liquid. Patient remains alert and oriented, her gown and blankets changed and skin cleansed. Dr Rufina Issa notified.      Favian Vines RN  11/07/20 3101

## 2020-11-07 NOTE — ED TRIAGE NOTES
Pt comes to the ED from [facility] via EMS, pt reports that she has been nauseated since yesterday afternoon but did not start vomiting until around 0100 (3.5 hour pta). Pt reports several bouts of coffee ground emesis, which is also causing burning in her chest and throat. Pt reports she's had 2 previous GI bleed from bleeding ulcers. Pt also has a LUQ PEG tube that she states was placed because she wasn't able to keep anything down for a long time, per pt they no longer use it and she takes everything orally. Pt is A&Ox4, she is diaphoretic but RR even and non-labored. Patient changed into gown, PIV checked, labs collected and labeled at bedside and an ekg was obtained. Pt has been placed on continuous telemetry monitoring.

## 2020-11-07 NOTE — ED NOTES
Patient having nausea, one episode of coffee ground emesis. Patient given maalox.      Mary Meier RN  11/07/20 [id number]

## 2020-11-07 NOTE — ED NOTES
Bed: 14  Expected date:   Expected time:   Means of arrival:   Comments:  ANAND James RN  11/07/20 9533

## 2020-11-07 NOTE — CONSULTS
procedure well.      Recommendations/Plan:   1. Start TF consciously   2. High dose PPI and Carafate  3. treat H pylori if positive   4.  If tolerate TF will try liquid diet      Electronically signed by Sis Browne MD  on 5/18/2020 at 6:51 PM     Past Medical/Social/Family History:  Past Medical History:   Diagnosis Date    Anxiety     Arthritis     Back pain     Bronchitis     Caffeine use     8 coffee / day    Carpal tunnel syndrome     Cataracts, bilateral     Constipation     Depression     Diarrhea     GERD (gastroesophageal reflux disease)     H/O gastric ulcer     Hyperlipidemia     Hypertension     Mumps     MVP (mitral valve prolapse)     Dr. Alex Levin in May 2019    Recurrent UTI     Dr. Anthony Tristan    Sinusitis     Tr[facility] in place [facility])     Ulcerative esophagitis     Wellness examination     Dr. Shannan Heller seen in Jan 2020     Past Surgical History:   Procedure Laterality Date    APPENDECTOMY      CARPAL TUNNEL RELEASE      CHOLECYSTECTOMY, LAPAROSCOPIC  05/22/2020     LAPAROSCOPIC ROBOTIC CHOLECYSTECTOMY, PYLOROPLASTY     CHOLECYSTECTOMY, LAPAROSCOPIC N/A 5/22/2020    XI LAPAROSCOPIC ROBOTIC CHOLECYSTECTOMY, PYLOROPLASTY performed by Cheyenne Mccall MD at [address]      EGD  3/17/2020         ERCP  05/21/2020    with stent insertion, balloon dilation, sphinctereotomy    ERCP  5/21/2020    ** CASE IN OR WITY GI STAFF** ERCP STENT INSERTION performed by Sis Browne MD at [facility]    ERCP  5/21/2020    ** CASE IN OR WITH GI STAFF**ERCP SPHINCTER/PAPILLOTOMY performed by Sis Browne MD at [facility]    ERCP  5/21/2020    ** CASE IN OR WITH GI STAFF**ERCP DILATION BALLOON performed by Sis Browne MD at [address]    GASTRIC FUNDOPLICATION N/A 4/59/9629    XI LAPAROSCOPIC ROBOTIC HIATAL HERNIA REPAIR, CHERYL FUNDOPLICATION performed by Cheyenne Mccall MD at [address] 3/27/2020    EGD PEG TUBE PLACEMENT performed by Guerita Rider MD at [address] CATH POWER PICC TRIPLE  3/4/2020         HYSTERECTOMY      Abdominal    JOINT REPLACEMENT Right     right hip    OVARY REMOVAL      RHINOPLASTY      TONSILLECTOMY      TOTAL HIP ARTHROPLASTY      TOTAL NEPHRECTOMY      atrophic from infections, age 13   Jannell Outhouse TRACHEOSTOMY N/A 3/27/2020    **ADD ON **WANTS 10:00AM **TRACHEOTOMY performed by Guerita Rider MD at [address] N/A 4/2/2020    TRACHEOTOMY EXCHANGE performed by Guerita Rider MD at [address] ENDOSCOPY N/A 3/17/2020    BEDSIDE EGD ESOPHAGOGASTRODUODENOSCOPY (ICU) performed by Guerita Rider MD at [facility]    UPPER GASTROINTESTINAL ENDOSCOPY N/A 5/18/2020    EGD BIOPSY performed by Slade Walton MD at [address] [place] [address]  5/18/2020    EGD DILATION BALLOON performed by Slade Walton MD at [address] [place] [address] N/A 7/6/2020    ** CASE IN O.R. WITH GI STAFF** EGD ESOPHAGOGASTRODUODENOSCOPY performed by Clem May MD at [facility]     Family History   Problem Relation Age of Onset    Cancer Mother         uterine    Heart Attack Mother     Heart Attack Father     Other Maternal Grandfather         foot gangrene    Other Paternal Grandmother         bleeding ulcers    Other Paternal Grandfather         lung cancer       Allergies: Allergies   Allergen Reactions    Prednisone Anxiety    Compazine [Prochlorperazine Maleate]     Penicillin V Potassium     Penicillins Other (See Comments)     shock       Home medications:  Prior to Admission medications    Medication Sig Start Date End Date Taking? Authorizing Provider   clonazePAM (KLONOPIN) 0.5 MG tablet Take 0.5 tablets by mouth 2 times daily for 60 days.  Taking 1/2 tablet BID 7/7/20 9/5/20  Cristian Handley MD   pantoprazole (PROTONIX) 40 MG tablet Take 1 tablet by mouth 2 times daily 7/7/20   Alberto Treadwell MD   ferrous sulfate (IRON 325) 325 (65 Fe) MG tablet Take 1 tablet by mouth 2 times daily 7/7/20   Alberto Treadwell MD   amitriptyline (ELAVIL) 25 MG tablet Take 1 tablet by mouth nightly 7/14/20   Alberto Treadwell MD   busPIRone (BUSPAR) 10 MG tablet Take 2 tablets by mouth 3 times daily 7/14/20   Alberto Treadwell MD   escitalopram (LEXAPRO) 20 MG tablet Take 1 tablet by mouth daily 7/14/20   Alberto Treadwell MD   midodrine (PROAMATINE) 5 MG tablet Take 1 tablet by mouth 3 times daily as needed (if SBP < 100) 6/1/20   Jose Angel Wong MD   metoprolol succinate (TOPROL XL) 25 MG extended release tablet Take 1 tablet by mouth daily 5/30/20   Jose Angelroberto Wong MD   QUEtiapine (SEROQUEL) 100 MG tablet Take 1 tablet by mouth nightly 4/6/20   Minnie Nelson MD   furosemide (LASIX) 20 MG tablet Take 1 tablet by mouth daily 4/6/20   Minnie Nelson MD   docusate (COLACE) 50 MG/5ML liquid 10 mLs by Per G Tube route 2 times daily 4/6/20   Minnie Nelson MD   metoclopramide (REGLAN) 5 MG/ML injection Infuse 2 mLs intravenously every 6 hours 4/6/20   Minnie Nelson MD   sucralfate (CARAFATE) 1 GM tablet Take 1 tablet by mouth 4 times daily 4/6/20   Minnie Nelson MD   traZODone (DESYREL) 100 MG tablet Take 1 tablet by mouth nightly 4/6/20   Minnie Nelson MD   RA VITAMIN D-3 50 MCG (2000 UT) CAPS take 1 capsule by mouth once daily 2/14/20   Historical Provider, MD   Oyster Shell 500 MG TABS Take 500 mg by mouth 2 times daily     Historical Provider, MD   Acetaminophen 500 MG CAPS Take 1 tablet by mouth as needed for Pain (follow OTC instructions)    Historical Provider, MD   calcium carbonate (TUMS) 500 MG chewable tablet Take 1 tablet by mouth 3 times daily as needed for Heartburn    Historical Provider, MD   gabapentin (NEURONTIN) 300 MG capsule Take 300 mg by mouth 3 times daily.     Historical Provider, MD   simvastatin (ZOCOR) 40 MG tablet Take scarring.  Wall thickening involving the    distal esophagus and moderate size hiatal hernia which are fluid-filled. Adjacent subcentimeter distal paraesophageal lymph node.         Organs: Hepatic fatty infiltration.  Spleen, pancreas and right adrenal gland    appear unremarkable.  Stable slight nodular thickening of the left adrenal    gland.  Prior right nephrectomy and cholecystectomy.  Common bile duct stent    is in place.         GI/Bowel: No evidence of bowel obstruction or inflammation.  Large amount of    stool in the rectosigmoid which may be correlated for constipation.  G-tube    is in place.         Pelvis: Bladder is grossly unremarkable.  Hysterectomy.         Peritoneum/Retroperitoneum: Normal caliber abdominal aorta moderate to severe    infrarenal atherosclerosis.  No suspicious lymphadenopathy. No ascites or    free air.         Bones/Soft Tissues: Stable scoliosis and multilevel degenerative change of    the lumbar and visualized thoracic spine.  Stable right hip arthroplasty.              Impression    *Wall thickening involving the distal esophagus and moderate size hiatal    hernia which are fluid-filled suggesting esophagitis/gastritis etiology of    which may be infectious or inflammatory versus less likely neoplastic. Adjacent subcentimeter distal paraesophageal lymph node which may be reactive    in nature.  Recommend GI consultation and consider further assessment with    endoscopy as clinically warranted. *Numerous incidental and chronic/postsurgical findings as detailed above. Hemotological labs: Anemia studies:  No results for input(s): LABIRON, TIBC, FERRITIN, [id number], FOLATE, OCCULTBLD in the last 72 hours.     CBC:  Recent Labs     11/07/20 0441   WBC 13.1*   HGB 12.8   MCV 88.8   RDW 15.9*          PT/INR:  Recent Labs     11/07/20 0441   PROTIME 10.1   INR 1.0       BMP:  Recent Labs     11/07/20 0441      K 5.0      CO2 19*   BUN 32* CREATININE 1.00*   GLUCOSE 142*   CALCIUM 9.6       Liver work up:  Hepatitis Functional Panel:  Recent Labs     11/07/20  0441   ALKPHOS 123*   ALT 17   AST 14   PROT 6.7   BILITOT 0.19*   LABALBU 3.4*       Amylase/Lipase/Ammonia:  Recent Labs     11/07/20  0441   LIPASE 15       Acute Hepatitis Panel:  Lab Results   Component Value Date    HEPBSAG NONREACTIVE 05/16/2020    HEPCAB NONREACTIVE 05/16/2020    HEPBIGM NONREACTIVE 05/16/2020    HEPAIGM NONREACTIVE 05/16/2020       Cancer Markers:  CEA:    No results for input(s): CEA in the last 72 hours. Ca 125:   No results for input(s):  in the last 72 hours. Ca 19-9:     Invalid input(s):   AFP: No results for input(s): AFP in the last 72 hours. Active Problems:    GI bleed  Resolved Problems:    * No resolved hospital problems. *       GI Impression:    76 yr old female (from [facility]) admitted with multiple episodes of coffee ground emesis in the setting of previous Nissen fundoplication, previously dx severe Esophagitis and Gastritis. CT imaging suggestive of Esophagitis/Gastrtits still present. Most likely pt contracted viral illness leading to nausea, vomiting causing small superficial mucosal tear likely at anastomotic site. Hgb is stable. Plan and Recommendations:    1. Will cont on PPI drip x 24 hrs. If Hgb is stable, then it can be changed to 40 mg PO BID  2. We will plan for EGD on Monday  3. Clear liquid diet as tolerated  4. Rec CBC, BMP daily. Hgb q 8 hrs  5. Monitor for active bleeding, hematemesis etc    This plan was formulated in collaboration with Dr. Marco Ulloa MD    Electronically signed by:  Shlomo Cobos [address] [place] [address] Gastroenterology  11/7/2020    9:13 AM       Attending Deidre Keith  Patient seen and examined with Ms. Caldwell Roberto, CNP.  I have reviewed the above note and confirmed the key elements of the medical history and physical exam. I have discussed the findings, established the care plan and recommendations with Ms. Devan Tinoco and primary team.    Angela Landin MD  Gastroenterology

## 2020-11-08 LAB
ALBUMIN SERPL-MCNC: 2.7 G/DL (ref 3.5–5.2)
ANION GAP SERPL CALCULATED.3IONS-SCNC: 9 MMOL/L (ref 9–17)
BUN BLDV-MCNC: 30 MG/DL (ref 8–23)
BUN/CREAT BLD: ABNORMAL (ref 9–20)
CALCIUM SERPL-MCNC: 8.1 MG/DL (ref 8.6–10.4)
CHLORIDE BLD-SCNC: 107 MMOL/L (ref 98–107)
CO2: 22 MMOL/L (ref 20–31)
CREAT SERPL-MCNC: 0.99 MG/DL (ref 0.5–0.9)
GFR AFRICAN AMERICAN: >60 ML/MIN
GFR NON-AFRICAN AMERICAN: 55 ML/MIN
GFR SERPL CREATININE-BSD FRML MDRD: ABNORMAL ML/MIN/{1.73_M2}
GFR SERPL CREATININE-BSD FRML MDRD: ABNORMAL ML/MIN/{1.73_M2}
GLUCOSE BLD-MCNC: 91 MG/DL (ref 70–99)
HCT VFR BLD CALC: 30.4 % (ref 36.3–47.1)
HCT VFR BLD CALC: 33 % (ref 36.3–47.1)
HCT VFR BLD CALC: 33 % (ref 36.3–47.1)
HCT VFR BLD CALC: 33.1 % (ref 36.3–47.1)
HEMOGLOBIN: 10 G/DL (ref 11.9–15.1)
HEMOGLOBIN: 8.9 G/DL (ref 11.9–15.1)
HEMOGLOBIN: 9.7 G/DL (ref 11.9–15.1)
HEMOGLOBIN: 9.8 G/DL (ref 11.9–15.1)
IRON SATURATION: 25 % (ref 20–55)
IRON: 56 UG/DL (ref 37–145)
MAGNESIUM: 1.8 MG/DL (ref 1.6–2.6)
MCH RBC QN AUTO: 27.2 PG (ref 25.2–33.5)
MCHC RBC AUTO-ENTMCNC: 29.4 G/DL (ref 28.4–34.8)
MCV RBC AUTO: 92.4 FL (ref 82.6–102.9)
NRBC AUTOMATED: 0 PER 100 WBC
PDW BLD-RTO: 16.6 % (ref 11.8–14.4)
PHOSPHORUS: 3.6 MG/DL (ref 2.6–4.5)
PLATELET # BLD: 201 K/UL (ref 138–453)
PMV BLD AUTO: 10.4 FL (ref 8.1–13.5)
POTASSIUM SERPL-SCNC: 4 MMOL/L (ref 3.7–5.3)
RBC # BLD: 3.57 M/UL (ref 3.95–5.11)
SODIUM BLD-SCNC: 138 MMOL/L (ref 135–144)
TOTAL IRON BINDING CAPACITY: 223 UG/DL (ref 250–450)
UNSATURATED IRON BINDING CAPACITY: 167 UG/DL (ref 112–347)
WBC # BLD: 7.8 K/UL (ref 3.5–11.3)

## 2020-11-08 PROCEDURE — 6370000000 HC RX 637 (ALT 250 FOR IP): Performed by: INTERNAL MEDICINE

## 2020-11-08 PROCEDURE — 85018 HEMOGLOBIN: CPT

## 2020-11-08 PROCEDURE — 83735 ASSAY OF MAGNESIUM: CPT

## 2020-11-08 PROCEDURE — 2580000003 HC RX 258: Performed by: NURSE PRACTITIONER

## 2020-11-08 PROCEDURE — 85014 HEMATOCRIT: CPT

## 2020-11-08 PROCEDURE — 6360000002 HC RX W HCPCS: Performed by: INTERNAL MEDICINE

## 2020-11-08 PROCEDURE — 85027 COMPLETE CBC AUTOMATED: CPT

## 2020-11-08 PROCEDURE — 99231 SBSQ HOSP IP/OBS SF/LOW 25: CPT | Performed by: INTERNAL MEDICINE

## 2020-11-08 PROCEDURE — 2060000000 HC ICU INTERMEDIATE R&B

## 2020-11-08 PROCEDURE — 80069 RENAL FUNCTION PANEL: CPT

## 2020-11-08 PROCEDURE — 6370000000 HC RX 637 (ALT 250 FOR IP): Performed by: NURSE PRACTITIONER

## 2020-11-08 PROCEDURE — 36415 COLL VENOUS BLD VENIPUNCTURE: CPT

## 2020-11-08 PROCEDURE — 99232 SBSQ HOSP IP/OBS MODERATE 35: CPT | Performed by: INTERNAL MEDICINE

## 2020-11-08 PROCEDURE — C9113 INJ PANTOPRAZOLE SODIUM, VIA: HCPCS | Performed by: NURSE PRACTITIONER

## 2020-11-08 PROCEDURE — 6360000002 HC RX W HCPCS: Performed by: NURSE PRACTITIONER

## 2020-11-08 PROCEDURE — 2580000003 HC RX 258: Performed by: INTERNAL MEDICINE

## 2020-11-08 PROCEDURE — 83550 IRON BINDING TEST: CPT

## 2020-11-08 PROCEDURE — 83540 ASSAY OF IRON: CPT

## 2020-11-08 RX ORDER — DOCUSATE SODIUM 100 MG/1
100 CAPSULE, LIQUID FILLED ORAL 2 TIMES DAILY PRN
Status: DISCONTINUED | OUTPATIENT
Start: 2020-11-08 | End: 2020-11-10 | Stop reason: HOSPADM

## 2020-11-08 RX ADMIN — GABAPENTIN 300 MG: 300 CAPSULE ORAL at 09:10

## 2020-11-08 RX ADMIN — QUETIAPINE FUMARATE 100 MG: 100 TABLET ORAL at 20:23

## 2020-11-08 RX ADMIN — BUSPIRONE HYDROCHLORIDE 20 MG: 10 TABLET ORAL at 20:24

## 2020-11-08 RX ADMIN — FERROUS SULFATE TAB EC 325 MG (65 MG FE EQUIVALENT) 325 MG: 325 (65 FE) TABLET DELAYED RESPONSE at 20:24

## 2020-11-08 RX ADMIN — GABAPENTIN 300 MG: 300 CAPSULE ORAL at 20:23

## 2020-11-08 RX ADMIN — ACETAMINOPHEN 650 MG: 325 TABLET ORAL at 06:07

## 2020-11-08 RX ADMIN — SODIUM CHLORIDE: 9 INJECTION, SOLUTION INTRAVENOUS at 13:00

## 2020-11-08 RX ADMIN — METOCLOPRAMIDE 10 MG: 5 INJECTION, SOLUTION INTRAMUSCULAR; INTRAVENOUS at 20:23

## 2020-11-08 RX ADMIN — BUSPIRONE HYDROCHLORIDE 20 MG: 10 TABLET ORAL at 13:00

## 2020-11-08 RX ADMIN — SODIUM CHLORIDE: 9 INJECTION, SOLUTION INTRAVENOUS at 06:08

## 2020-11-08 RX ADMIN — METOCLOPRAMIDE 10 MG: 5 INJECTION, SOLUTION INTRAMUSCULAR; INTRAVENOUS at 17:07

## 2020-11-08 RX ADMIN — SODIUM CHLORIDE, PRESERVATIVE FREE 10 ML: 5 INJECTION INTRAVENOUS at 09:10

## 2020-11-08 RX ADMIN — ESCITALOPRAM OXALATE 20 MG: 10 TABLET ORAL at 09:10

## 2020-11-08 RX ADMIN — METOPROLOL SUCCINATE 25 MG: 25 TABLET, FILM COATED, EXTENDED RELEASE ORAL at 09:10

## 2020-11-08 RX ADMIN — SUCRALFATE 1 G: 1 TABLET ORAL at 12:58

## 2020-11-08 RX ADMIN — ACETAMINOPHEN 650 MG: 325 TABLET ORAL at 17:12

## 2020-11-08 RX ADMIN — SODIUM CHLORIDE 8 MG/HR: 9 INJECTION, SOLUTION INTRAVENOUS at 18:10

## 2020-11-08 RX ADMIN — METOCLOPRAMIDE 10 MG: 5 INJECTION, SOLUTION INTRAMUSCULAR; INTRAVENOUS at 03:39

## 2020-11-08 RX ADMIN — IRON SUCROSE 200 MG: 20 INJECTION, SOLUTION INTRAVENOUS at 09:23

## 2020-11-08 RX ADMIN — TRAZODONE HYDROCHLORIDE 100 MG: 100 TABLET ORAL at 20:23

## 2020-11-08 RX ADMIN — FERROUS SULFATE TAB EC 325 MG (65 MG FE EQUIVALENT) 325 MG: 325 (65 FE) TABLET DELAYED RESPONSE at 09:10

## 2020-11-08 RX ADMIN — BUSPIRONE HYDROCHLORIDE 20 MG: 10 TABLET ORAL at 09:10

## 2020-11-08 RX ADMIN — SUCRALFATE 1 G: 1 TABLET ORAL at 09:10

## 2020-11-08 RX ADMIN — SUCRALFATE 1 G: 1 TABLET ORAL at 20:23

## 2020-11-08 RX ADMIN — CLONAZEPAM 0.25 MG: 0.5 TABLET ORAL at 09:36

## 2020-11-08 RX ADMIN — SODIUM CHLORIDE, PRESERVATIVE FREE 10 ML: 5 INJECTION INTRAVENOUS at 20:24

## 2020-11-08 RX ADMIN — Medication 10 ML: at 17:07

## 2020-11-08 RX ADMIN — CLONAZEPAM 0.25 MG: 0.5 TABLET ORAL at 20:23

## 2020-11-08 RX ADMIN — BISACODYL 10 MG: 10 SUPPOSITORY RECTAL at 12:51

## 2020-11-08 RX ADMIN — AMITRIPTYLINE HYDROCHLORIDE 25 MG: 25 TABLET, FILM COATED ORAL at 20:23

## 2020-11-08 RX ADMIN — GABAPENTIN 300 MG: 300 CAPSULE ORAL at 17:06

## 2020-11-08 RX ADMIN — SUCRALFATE 1 G: 1 TABLET ORAL at 17:11

## 2020-11-08 RX ADMIN — PROMETHAZINE HYDROCHLORIDE 12.5 MG: 12.5 TABLET ORAL at 06:52

## 2020-11-08 RX ADMIN — METOCLOPRAMIDE 10 MG: 5 INJECTION, SOLUTION INTRAMUSCULAR; INTRAVENOUS at 09:09

## 2020-11-08 RX ADMIN — SODIUM CHLORIDE 8 MG/HR: 9 INJECTION, SOLUTION INTRAVENOUS at 06:52

## 2020-11-08 NOTE — CARE COORDINATION
\Case Management Initial Discharge Plan  Alvarez Noyola,             Met with:patient to discuss discharge plans. Information verified: address, contacts, phone number, , insurance Yes    Emergency Contact/Next of Kin name & number: Chris pfeiffer - [phone]    PCP: House doctor at [facility]  Date of last visit: Last week    Insurance Provider: Medicare and [place] medicaid    Discharge Planning    Living Arrangements:      Support Systems:       Home has 1 stories  0 stairs to climb to get into front door, 0stairs to climb to reach second floor  Location of bedroom/bathroom in home 1 st floor    Patient able to perform ADL's:Assisted    Current Services (outpatient & in home) Lives in a SNF  DME equipment: walker  DME provider:     Receiving oral anticoagulation therapy? No    If indicated:   Physician managing anticoagulation treatment:   Where does patient obtain lab work for ATC treatment? Potential Assistance Needed:       Patient agreeable to home care: No  [place] of choice provided:  n/a    Prior SNF/Rehab Placement and Facility: [place] [id number] to SNF/Rehab: No  [place] of choice provided: n/a     Evaluation: yes    Expected Discharge date:       Patient expects to be discharged to: Follow Up Appointment: Best Day/ Time:      Transportation provider: ambulance  Transportation arrangements needed for discharge: Yes will need an ambulance    Readmission Risk              Risk of Unplanned Readmission:        34             Does patient have a readmission risk score greater than 14?: Yes  If yes, follow-up appointment must be made within 7 days of discharge. Goals of Care:       Discharge Plan: Pt plans to return to [facility] where she is a long term resident.           Electronically signed by KELSI Rivera on 20 at 8:59 AM EST

## 2020-11-08 NOTE — PROGRESS NOTES
GASTROENTEROLOGY PROGRESS NOTE      11/8/2020      SUBJECTIVE:  Patient feels better this morning. Denies any nausea, vomiting, fever, chills, abdominal pain or diarrhea or constipation. No further episodes of coffee-ground emesis. Tolerating liquid diet. REVIEW OF SYSTEMS:  A 12 system review was performed and pertinent positives and negatives are documented in the HPI, rest were otherwise negative. PHYSICAL EXAM:    BP (!) 92/44   Pulse 69   Temp 97.9 °F (36.6 °C) (Oral)   Resp 16   Ht 5' 2\" (1.575 m)   Wt 183 lb 9.6 oz (83.3 kg)   SpO2 95%   BMI 33.58 kg/m²   General appearance: Alert, No acute distress  Lungs: Clear bilaterally, nonlabored breathing without use of accessory muscles. Heart:S1S2 - regular rhythm  Abdomen: Soft, ND +BS, no masses, tender to palpation in epigastric region  Skin:  No jaundice, no stigmata of chronic liver disease. Neuro: Awake, alert, follows requests. PERRLA. No focal deficits.        Lab and Imaging Review   Data Review:    Labs and Imaging:     CBC:  Recent Labs     11/07/20 0441 11/07/20  0937 11/07/20  1859 11/08/20  0157 11/08/20  0610 11/08/20  1224   WBC 13.1* 11.4*  --   --  7.8  --    HGB 12.8 12.9 11.6* 10.0* 9.7* 9.8*   MCV 88.8 88.0  --   --  92.4  --    RDW 15.9* 15.9*  --   --  16.6*  --     247  --   --  201  --        ANEMIA STUDIES:  Recent Labs     11/07/20  0640 11/08/20  0610   LABIRON  --  25   TIBC  --  223*   FERRITIN 65  --        BMP:  Recent Labs     11/07/20 0441 11/08/20  0610    138   K 5.0 4.0    107   CO2 19* 22   BUN 32* 30*   CREATININE 1.00* 0.99*   GLUCOSE 142* 91   CALCIUM 9.6 8.1*       LFTS:  Recent Labs     11/07/20 0441 11/08/20  0610   ALKPHOS 123*  --    ALT 17  --    AST 14  --    BILITOT 0.19*  --    LABALBU 3.4* 2.7*       Amylase/Lipase and Ammonia:  Recent Labs     11/07/20  0441   LIPASE 15       Acute

## 2020-11-08 NOTE — PROGRESS NOTES
[facility]  Office: [address], DO, Yuri Rhea, DO, Elina Karimi, DO, Francisco Lrkristie Blood, DO, Ancelmo Metz MD, Rowena Hernandez MD, Gabriela Christy MD, Erendira Mercado MD, Moo Ventura MD, Alfredo Ruiz MD, Tammi Levine MD, Linsey Christie MD, Kvng Greene MD, Yaneli Atkins, DO, Shaheen Glaser MD, Jose Carmona MD, Dorcas Wagner DO, Dangelo Eduardo MD,  Estuardo Caballero DO, Kalyan Farrar MD, Juan Pablo Cunningham MD, Julian Ayala, [facility], [facility], CNP, Willow Loja, CNP, Sherry Hughes, CNS, Camilla Pastscotty, CNP, Shasha Adams, CNP, Samina Pedersen, CNP, Rosetta Nova, CNP, Angella Kwon, CNP, Shayne Odell PA-C, Gagan Ramos, [facility], Rey Koo, CNP, Emile Piper, CNP, Rodrick Gudino, CNP, Elisabet Monroy, CNP, Adolfo Jc, [address] [place] [address]    Progress Note    11/8/2020    11:35 AM    Name:   Tony Parrish  MRN:     [id number]     Acct:      [de-identified]   Room:   2003/2003-01   Day:  1  Admit Date:  11/7/2020  4:32 AM    PCP:   Sherry Nuno MD  Code Status:  Full Code    Subjective:     C/C:   Chief Complaint   Patient presents with    GI Bleeding     woke up having coffee ground emesis around 0100     Interval History Status: improved. No issues overnight, patient's abdominal pain has improved drastically. Hemoglobin has been stable overnight, discussed with gastroenterology today and suspect fundoplication has failed, hiatal hernia has repair, expect severe esophagitis. Plan for EGD tomorrow    Brief History:     Tony Parrish is a 76 y.o. Non-/non  female who presents with GI Bleeding (woke up having coffee ground emesis around 0100)   and is admitted to the hospital for the management of Acute upper GI bleed.     Patient states she woke up last night with acute episode of coffee-ground emesis which has been worsening throughout the day.   Patient has had 4-5 episodes before presenting in the emergency department. Patient states he has a history of upper GI bleed previously seen on July 2020. Patient also has a history of robotic fundoplication on 0/91/3771. Patient underwent EGD and found to have severe esophagitis patient was started on twice daily PPI, patient also noted to have an aspiration pneumonia which was treated with Zyvox. Patient denies any lightheadedness dizziness, she does admit that she is anxious and this can exacerbate her nausea and vomiting. No shortness of breath, no chest pain. Review of Systems:     Constitutional:  negative for chills, fevers, sweats  Respiratory:  negative for cough, dyspnea on exertion, shortness of breath, wheezing  Cardiovascular:  negative for chest pain, chest pressure/discomfort, lower extremity edema, palpitations  Gastrointestinal:  negative for abdominal pain, constipation, diarrhea, nausea, vomiting  Neurological:  negative for dizziness, headache    Medications: Allergies:     Allergies   Allergen Reactions    Prednisone Anxiety    Compazine [Prochlorperazine Maleate]     Penicillin V Potassium     Penicillins Other (See Comments)     shock       Current Meds:   Scheduled Meds:    sodium chloride flush  10 mL Intravenous 2 times per day    [START ON 11/10/2020] pantoprazole  40 mg Intravenous Daily    And    [START ON 11/10/2020] sodium chloride (PF)  10 mL Intravenous Daily    clonazePAM  0.25 mg Oral BID    escitalopram  20 mg Oral Daily    traZODone  100 mg Oral Nightly    busPIRone  20 mg Oral TID    docusate  100 mg Per G Tube BID    ferrous sulfate  325 mg Oral BID    gabapentin  300 mg Oral TID    metoprolol succinate  25 mg Oral Daily    QUEtiapine  100 mg Oral Nightly    sucralfate  1 g Oral 4x Daily    atorvastatin  20 mg Oral Daily    amitriptyline  25 mg Oral Nightly    metoclopramide  10 mg Intravenous Q6H     Continuous Infusions:    sodium chloride 150 mL/hr at 11/08/20 0608    pantoprozole (PROTONIX) infusion 8 mg/hr (20 6363)    sodium chloride Stopped (20 0000)     PRN Meds: sodium chloride flush, acetaminophen **OR** acetaminophen, promethazine **OR** ondansetron, bisacodyl, calcium carbonate, LORazepam    Data:     Past Medical History:   has a past medical history of Anxiety, Arthritis, Back pain, Bronchitis, Caffeine use, Carpal tunnel syndrome, Cataracts, bilateral, Constipation, Depression, Diarrhea, GERD (gastroesophageal reflux disease), H/O gastric ulcer, Hyperlipidemia, Hypertension, Mumps, MVP (mitral valve prolapse), Recurrent UTI, Sinusitis, Tracheostomy in place [facility]), Ulcerative esophagitis, and Wellness examination. Social History:   reports that she has quit smoking. Her smoking use included cigarettes. She has a 50.00 pack-year smoking history. She has never used smokeless tobacco. She reports that she does not drink alcohol or use drugs. Family History:   Family History   Problem Relation Age of Onset   Marya Me Cancer Mother         uterine    Heart Attack Mother     Heart Attack Father     Other Maternal Grandfather         foot gangrene    Other Paternal Grandmother         bleeding ulcers    Other Paternal Grandfather         lung cancer       Vitals:  BP (!) 80/39   Pulse 64   Temp 98.4 °F (36.9 °C) (Oral)   Resp 19   Ht 5' 2\" (1.575 m)   Wt 183 lb 9.6 oz (83.3 kg)   SpO2 100%   BMI 33.58 kg/m²   Temp (24hrs), Av.3 °F (36.8 °C), Min:97.7 °F (36.5 °C), Max:99 °F (37.2 °C)    No results for input(s): POCGLU in the last 72 hours. I/O (24Hr):     Intake/Output Summary (Last 24 hours) at 2020 1135  Last data filed at 2020 0600  Gross per 24 hour   Intake 1355 ml   Output 1480 ml   Net -125 ml       Labs:  Hematology:  Recent Labs     20  0441 20  0937 20  1859 20  0157 20  0610   WBC 13.1* 11.4*  --   --  7.8   RBC 4.72 4.85  --   --  3.57*   HGB 12.8 12.9 11.6* 10.0* 9.7*   HCT 41.9 42.7 37.0 33.0* 33.0*   MCV 88.8 88.0  --   --  92.4   MCH 27.1 26.6  --   --  27.2   MCHC 30.5 30.2  --   --  29.4   RDW 15.9* 15.9*  --   --  16.6*    247  --   --  201   MPV 10.3 10.8  --   --  10.4   INR 1.0  --   --   --   --      Chemistry:  Recent Labs     11/07/20 0441 11/07/20 0640 11/07/20 0643 11/08/20  0610     --   --  138   K 5.0  --   --  4.0     --   --  107   CO2 19*  --   --  22   GLUCOSE 142*  --   --  91   BUN 32*  --   --  30*   CREATININE 1.00*  --   --  0.99*   MG  --   --   --  1.8   ANIONGAP 16  --   --  9   LABGLOM 54*  --   --  55*   GFRAA >60  --   --  >60   CALCIUM 9.6  --   --  8.1*   PHOS  --   --   --  3.6   TROPHS 59* 41*  --   --    LACTACIDWB  --   --  2.3*  --      Recent Labs     11/07/20 0441 11/08/20  0610   PROT 6.7  --    LABALBU 3.4* 2.7*   AST 14  --    ALT 17  --    ALKPHOS 123*  --    BILITOT 0.19*  --    LIPASE 15  --      ABG:  Lab Results   Component Value Date    POCPH 7.437 05/30/2020    POCPCO2 42.3 05/30/2020    POCPO2 129.9 05/30/2020    POCHCO3 28.6 05/30/2020    NBEA NOT REPORTED 05/30/2020    PBEA 4 05/30/2020    OOW0MMJ 30 05/30/2020    OBER6BFJ 99 05/30/2020    FIO2 40.0 05/30/2020     Lab Results   Component Value Date/Time    SPECIAL NOT REPORTED 07/05/2020 06:30 PM     Lab Results   Component Value Date/Time    CULTURE NOT NEED PER RN 07/05/2020 06:30 PM       Radiology:  Ct Abdomen Pelvis W Iv Contrast    Result Date: 11/7/2020  *Wall thickening involving the distal esophagus and moderate size hiatal hernia which are fluid-filled suggesting esophagitis/gastritis etiology of which may be infectious or inflammatory versus less likely neoplastic. Adjacent subcentimeter distal paraesophageal lymph node which may be reactive in nature. Recommend GI consultation and consider further assessment with endoscopy as clinically warranted. *Numerous incidental and chronic/postsurgical findings as detailed above.      Xr Chest Portable    Result Date: 11/7/2020  Cardiomegaly and pulmonary vascular congestion. No focal consolidation. Follow-up PA and lateral chest radiographs may be helpful for further evaluation as warranted. Physical Examination:        General appearance:  alert, cooperative and no distress  Mental Status:  oriented to person, place and time and normal affect  Lungs:  clear to auscultation bilaterally, normal effort  Heart:  regular rate and rhythm, no murmur  Abdomen:  soft, nontender, nondistended, normal bowel sounds, no masses, hepatomegaly, splenomegaly  Extremities:  no edema, redness, tenderness in the calves  Skin:  no gross lesions, rashes, induration    Assessment:        Hospital Problems           Last Modified POA    * (Principal) Acute upper GI bleed 11/7/2020 Yes    Depression 11/7/2020 Yes    Anxiety 11/7/2020 Yes    Urine retention 11/7/2020 Yes    Acute on chronic blood loss anemia 11/7/2020 Yes    H/O gastric ulcer 11/7/2020 Yes    Hyperlipidemia 11/7/2020 Yes    Hypertension 11/7/2020 Yes    Tobacco abuse 11/7/2020 Yes    S/P Nissen fundoplication (without gastrostomy tube) procedure 11/7/2020 Yes                Plan:        1. Hematemesis -likely upper GI bleed as patient is having coffee-ground emesis and has a recent history of robotic gastric fundoplication on 5/42/1709. Patient was seen on 7/7/2020 for upper GI bleed as well. Protonix drip, EGD tomorrow, every 6 hour H&H  2. Elevated troponin-likely type II mechanism continue to monitor, no EKG changes  3. Anxiety-patient takes Elavil, BuSpar, Lexapro for anxiety, as needed Ativan added  4. Intractable nausea and vomiting-improved   5. Gastroparesis-patient is on Reglan 10 mg every 6 hours at home  6. Hyperlipidemia  7. Hypertension  8.  History of robotic gastric fundoplication    Lee Salter DO  11/8/2020  11:35 AM

## 2020-11-09 ENCOUNTER — ANESTHESIA EVENT (OUTPATIENT)
Dept: ENDOSCOPY | Age: 75
DRG: 380 | End: 2020-11-09
Payer: MEDICARE

## 2020-11-09 ENCOUNTER — ANESTHESIA (OUTPATIENT)
Dept: ENDOSCOPY | Age: 75
DRG: 380 | End: 2020-11-09
Payer: MEDICARE

## 2020-11-09 VITALS
DIASTOLIC BLOOD PRESSURE: 96 MMHG | RESPIRATION RATE: 25 BRPM | SYSTOLIC BLOOD PRESSURE: 116 MMHG | OXYGEN SATURATION: 98 %

## 2020-11-09 LAB
ALBUMIN SERPL-MCNC: 2.8 G/DL (ref 3.5–5.2)
ANION GAP SERPL CALCULATED.3IONS-SCNC: 10 MMOL/L (ref 9–17)
BUN BLDV-MCNC: 14 MG/DL (ref 8–23)
BUN/CREAT BLD: ABNORMAL (ref 9–20)
CALCIUM SERPL-MCNC: 8.4 MG/DL (ref 8.6–10.4)
CHLORIDE BLD-SCNC: 113 MMOL/L (ref 98–107)
CO2: 21 MMOL/L (ref 20–31)
CREAT SERPL-MCNC: 0.93 MG/DL (ref 0.5–0.9)
GFR AFRICAN AMERICAN: >60 ML/MIN
GFR NON-AFRICAN AMERICAN: 59 ML/MIN
GFR SERPL CREATININE-BSD FRML MDRD: ABNORMAL ML/MIN/{1.73_M2}
GFR SERPL CREATININE-BSD FRML MDRD: ABNORMAL ML/MIN/{1.73_M2}
GLUCOSE BLD-MCNC: 83 MG/DL (ref 70–99)
HCT VFR BLD CALC: 28 % (ref 36.3–47.1)
HCT VFR BLD CALC: 31.4 % (ref 36.3–47.1)
HEMOGLOBIN: 8.2 G/DL (ref 11.9–15.1)
HEMOGLOBIN: 9 G/DL (ref 11.9–15.1)
MAGNESIUM: 1.9 MG/DL (ref 1.6–2.6)
MCH RBC QN AUTO: 27.1 PG (ref 25.2–33.5)
MCHC RBC AUTO-ENTMCNC: 28.7 G/DL (ref 28.4–34.8)
MCV RBC AUTO: 94.6 FL (ref 82.6–102.9)
NRBC AUTOMATED: 0 PER 100 WBC
PDW BLD-RTO: 16.3 % (ref 11.8–14.4)
PHOSPHORUS: 3.2 MG/DL (ref 2.6–4.5)
PLATELET # BLD: 203 K/UL (ref 138–453)
PMV BLD AUTO: 11.1 FL (ref 8.1–13.5)
POTASSIUM SERPL-SCNC: 3.8 MMOL/L (ref 3.7–5.3)
RBC # BLD: 3.32 M/UL (ref 3.95–5.11)
SODIUM BLD-SCNC: 144 MMOL/L (ref 135–144)
WBC # BLD: 7.1 K/UL (ref 3.5–11.3)

## 2020-11-09 PROCEDURE — 97166 OT EVAL MOD COMPLEX 45 MIN: CPT

## 2020-11-09 PROCEDURE — 99232 SBSQ HOSP IP/OBS MODERATE 35: CPT | Performed by: INTERNAL MEDICINE

## 2020-11-09 PROCEDURE — 6360000002 HC RX W HCPCS: Performed by: INTERNAL MEDICINE

## 2020-11-09 PROCEDURE — 43235 EGD DIAGNOSTIC BRUSH WASH: CPT | Performed by: INTERNAL MEDICINE

## 2020-11-09 PROCEDURE — 85027 COMPLETE CBC AUTOMATED: CPT

## 2020-11-09 PROCEDURE — 6370000000 HC RX 637 (ALT 250 FOR IP): Performed by: INTERNAL MEDICINE

## 2020-11-09 PROCEDURE — 85018 HEMOGLOBIN: CPT

## 2020-11-09 PROCEDURE — C9113 INJ PANTOPRAZOLE SODIUM, VIA: HCPCS | Performed by: NURSE PRACTITIONER

## 2020-11-09 PROCEDURE — 97535 SELF CARE MNGMENT TRAINING: CPT

## 2020-11-09 PROCEDURE — 3700000000 HC ANESTHESIA ATTENDED CARE: Performed by: INTERNAL MEDICINE

## 2020-11-09 PROCEDURE — 6360000002 HC RX W HCPCS: Performed by: NURSE PRACTITIONER

## 2020-11-09 PROCEDURE — 2580000003 HC RX 258: Performed by: NURSE PRACTITIONER

## 2020-11-09 PROCEDURE — 2500000003 HC RX 250 WO HCPCS: Performed by: NURSE ANESTHETIST, CERTIFIED REGISTERED

## 2020-11-09 PROCEDURE — 2580000003 HC RX 258: Performed by: INTERNAL MEDICINE

## 2020-11-09 PROCEDURE — 83735 ASSAY OF MAGNESIUM: CPT

## 2020-11-09 PROCEDURE — 1200000000 HC SEMI PRIVATE

## 2020-11-09 PROCEDURE — 7100000011 HC PHASE II RECOVERY - ADDTL 15 MIN: Performed by: INTERNAL MEDICINE

## 2020-11-09 PROCEDURE — 3609017100 HC EGD: Performed by: INTERNAL MEDICINE

## 2020-11-09 PROCEDURE — 80069 RENAL FUNCTION PANEL: CPT

## 2020-11-09 PROCEDURE — 0DJ08ZZ INSPECTION OF UPPER INTESTINAL TRACT, VIA NATURAL OR ARTIFICIAL OPENING ENDOSCOPIC: ICD-10-PCS | Performed by: INTERNAL MEDICINE

## 2020-11-09 PROCEDURE — 6360000002 HC RX W HCPCS: Performed by: NURSE ANESTHETIST, CERTIFIED REGISTERED

## 2020-11-09 PROCEDURE — 36415 COLL VENOUS BLD VENIPUNCTURE: CPT

## 2020-11-09 PROCEDURE — 7100000010 HC PHASE II RECOVERY - FIRST 15 MIN: Performed by: INTERNAL MEDICINE

## 2020-11-09 PROCEDURE — 6370000000 HC RX 637 (ALT 250 FOR IP): Performed by: NURSE PRACTITIONER

## 2020-11-09 PROCEDURE — 85014 HEMATOCRIT: CPT

## 2020-11-09 RX ORDER — LIDOCAINE HYDROCHLORIDE 10 MG/ML
INJECTION, SOLUTION EPIDURAL; INFILTRATION; INTRACAUDAL; PERINEURAL PRN
Status: DISCONTINUED | OUTPATIENT
Start: 2020-11-09 | End: 2020-11-09 | Stop reason: SDUPTHER

## 2020-11-09 RX ORDER — PANTOPRAZOLE SODIUM 40 MG/1
40 TABLET, DELAYED RELEASE ORAL
Status: DISCONTINUED | OUTPATIENT
Start: 2020-11-09 | End: 2020-11-10 | Stop reason: HOSPADM

## 2020-11-09 RX ORDER — PROPOFOL 10 MG/ML
INJECTION, EMULSION INTRAVENOUS PRN
Status: DISCONTINUED | OUTPATIENT
Start: 2020-11-09 | End: 2020-11-09 | Stop reason: SDUPTHER

## 2020-11-09 RX ORDER — GLYCOPYRROLATE 1 MG/5 ML
SYRINGE (ML) INTRAVENOUS PRN
Status: DISCONTINUED | OUTPATIENT
Start: 2020-11-09 | End: 2020-11-09 | Stop reason: SDUPTHER

## 2020-11-09 RX ORDER — SODIUM CHLORIDE 9 MG/ML
INJECTION, SOLUTION INTRAVENOUS CONTINUOUS
Status: DISCONTINUED | OUTPATIENT
Start: 2020-11-09 | End: 2020-11-09

## 2020-11-09 RX ADMIN — ATORVASTATIN CALCIUM 20 MG: 20 TABLET, FILM COATED ORAL at 20:46

## 2020-11-09 RX ADMIN — METOCLOPRAMIDE 10 MG: 5 INJECTION, SOLUTION INTRAMUSCULAR; INTRAVENOUS at 17:30

## 2020-11-09 RX ADMIN — AMITRIPTYLINE HYDROCHLORIDE 25 MG: 25 TABLET, FILM COATED ORAL at 20:47

## 2020-11-09 RX ADMIN — TRAZODONE HYDROCHLORIDE 100 MG: 100 TABLET ORAL at 20:47

## 2020-11-09 RX ADMIN — GABAPENTIN 300 MG: 300 CAPSULE ORAL at 08:51

## 2020-11-09 RX ADMIN — METOCLOPRAMIDE 10 MG: 5 INJECTION, SOLUTION INTRAMUSCULAR; INTRAVENOUS at 08:54

## 2020-11-09 RX ADMIN — SODIUM CHLORIDE 8 MG/HR: 9 INJECTION, SOLUTION INTRAVENOUS at 03:57

## 2020-11-09 RX ADMIN — BUSPIRONE HYDROCHLORIDE 20 MG: 10 TABLET ORAL at 08:51

## 2020-11-09 RX ADMIN — DOCUSATE SODIUM 100 MG: 100 CAPSULE, LIQUID FILLED ORAL at 20:57

## 2020-11-09 RX ADMIN — SUCRALFATE 1 G: 1 TABLET ORAL at 17:30

## 2020-11-09 RX ADMIN — PROPOFOL 150 MG: 10 INJECTION, EMULSION INTRAVENOUS at 12:53

## 2020-11-09 RX ADMIN — BUSPIRONE HYDROCHLORIDE 20 MG: 10 TABLET ORAL at 20:47

## 2020-11-09 RX ADMIN — SUCRALFATE 1 G: 1 TABLET ORAL at 08:51

## 2020-11-09 RX ADMIN — CLONAZEPAM 0.25 MG: 0.5 TABLET ORAL at 20:48

## 2020-11-09 RX ADMIN — QUETIAPINE FUMARATE 100 MG: 100 TABLET ORAL at 20:47

## 2020-11-09 RX ADMIN — ESCITALOPRAM OXALATE 20 MG: 10 TABLET ORAL at 08:51

## 2020-11-09 RX ADMIN — GABAPENTIN 300 MG: 300 CAPSULE ORAL at 20:47

## 2020-11-09 RX ADMIN — LIDOCAINE HYDROCHLORIDE 25 MG: 10 INJECTION, SOLUTION EPIDURAL; INFILTRATION; INTRACAUDAL; PERINEURAL at 12:52

## 2020-11-09 RX ADMIN — SUCRALFATE 1 G: 1 TABLET ORAL at 20:47

## 2020-11-09 RX ADMIN — PANTOPRAZOLE SODIUM 40 MG: 40 TABLET, DELAYED RELEASE ORAL at 17:29

## 2020-11-09 RX ADMIN — ONDANSETRON 4 MG: 2 INJECTION INTRAMUSCULAR; INTRAVENOUS at 13:40

## 2020-11-09 RX ADMIN — CLONAZEPAM 0.25 MG: 0.5 TABLET ORAL at 08:51

## 2020-11-09 RX ADMIN — SODIUM CHLORIDE: 9 INJECTION, SOLUTION INTRAVENOUS at 11:56

## 2020-11-09 RX ADMIN — METOCLOPRAMIDE 10 MG: 5 INJECTION, SOLUTION INTRAMUSCULAR; INTRAVENOUS at 20:48

## 2020-11-09 RX ADMIN — Medication 0.2 MG: at 12:52

## 2020-11-09 RX ADMIN — FERROUS SULFATE TAB EC 325 MG (65 MG FE EQUIVALENT) 325 MG: 325 (65 FE) TABLET DELAYED RESPONSE at 08:51

## 2020-11-09 ASSESSMENT — LIFESTYLE VARIABLES: SMOKING_STATUS: 1

## 2020-11-09 ASSESSMENT — COPD QUESTIONNAIRES: CAT_SEVERITY: MODERATE

## 2020-11-09 NOTE — PROGRESS NOTES
[facility]  Office: [address], DO, Trishaav Hylton, DO, Gonzalo Horne, DO, Ericka Jensen, DO, Valiant Sicard, MD, Deann Newton MD, Brent Buckley MD, Eulalio Gillette MD, Romeo Marks MD, Francisco J Perkins MD, Elvia Munoz MD, Rell Carlson MD, Kvng Baldwin MD, Hugo Barrientos DO, Cortes Correa MD, Xavier Cantu MD, Itzel Motta DO, Nina Aragon MD,  Jayden Henriquez DO, Lila Snell MD, Justina Velásquez MD, Se Mcclain, [facility], [facility], [facility], Glendy Fernando, [facility], Jakob Foley, CNS, Mirella Willoughby, CNP, Machelle Linares, CNP, Jazmyn Bejarano, CNP, Blanche Brandon, CNP, Jamaica Rowan, CNP, Nigel Gr PA-C, Jayden Young [facility], Jt Rodriguez, CNP, Allen Sawant, CNP, Jaimie Isabel, CNP, Giovana Gonzalez, CNP, Dave Chin, [address] [place] [address]    Progress Note    11/9/2020    3:42 PM    Name:   Nicole Hernandez  MRN:     [id number]     [place]:      [de-identified]   Room:   2004/2004-01   Day:  2  Admit Date:  11/7/2020  4:32 AM    PCP:   Marnie Moulton MD  Code Status:  Full Code    Subjective:     C/C:   Chief Complaint   Patient presents with    GI Bleeding     woke up having coffee ground emesis around 0100     Interval History Status: improved. Patient seen and examined this afternoon. She is just returned from EGD. EGD with extensive gastritis. Medications adjusted by GI. Patient reports that she is hungry. Reports that her GERD is typically very severe in the outpatient setting. Vital signs are stable. Patient working with PT/OT in the outpatient setting and has just begun to stand again. Currently on 2 L of supplemental oxygen. Reports that she does not wear oxygen at home. Brief History:     Nicole Hernandez is a 76 y.o.  Non-/non  female who presents with GI Bleeding (woke up having coffee ground emesis around 0100)   and is admitted to the hospital for the management of Acute upper GI bleed.     Patient states she woke up last night with acute episode of coffee-ground emesis which has been worsening throughout the day. Patient has had 4-5 episodes before presenting in the emergency department. Patient states he has a history of upper GI bleed previously seen on July 2020. Patient also has a history of robotic fundoplication on 9/10/3579. Patient underwent EGD and found to have severe esophagitis patient was started on twice daily PPI, patient also noted to have an aspiration pneumonia which was treated with Zyvox. Patient denies any lightheadedness dizziness, she does admit that she is anxious and this can exacerbate her nausea and vomiting. No shortness of breath, no chest pain. Review of Systems:     Constitutional: Reports that she is hungry; negative for chills, fevers, sweats  Respiratory:  negative for cough, dyspnea on exertion, shortness of breath, wheezing  Cardiovascular:  negative for chest pain, chest pressure/discomfort, lower extremity edema, palpitations  Gastrointestinal:  negative for abdominal pain, constipation, diarrhea, nausea, vomiting  Neurological:  negative for dizziness, headache    Medications: Allergies:     Allergies   Allergen Reactions    Prednisone Anxiety    Compazine [Prochlorperazine Maleate]     Penicillin V Potassium     Penicillins Other (See Comments)     shock       Current Meds:   Scheduled Meds:    pantoprazole  40 mg Oral BID AC    sodium chloride flush  10 mL Intravenous 2 times per day    clonazePAM  0.25 mg Oral BID    escitalopram  20 mg Oral Daily    traZODone  100 mg Oral Nightly    busPIRone  20 mg Oral TID    docusate  100 mg Per G Tube BID    gabapentin  300 mg Oral TID    metoprolol succinate  25 mg Oral Daily    QUEtiapine  100 mg Oral Nightly    sucralfate  1 g Oral 4x Daily    atorvastatin  20 mg Oral Daily    amitriptyline  25 mg Oral Nightly    metoclopramide  10 mg Intravenous Q6H     Continuous Infusions:    sodium chloride 125 mL/hr at 20 0841     PRN Meds: docusate sodium, sodium chloride flush, acetaminophen **OR** acetaminophen, promethazine **OR** ondansetron, bisacodyl, calcium carbonate, LORazepam    Data:     Past Medical History:   has a past medical history of Anxiety, Arthritis, Back pain, Bronchitis, Caffeine use, Carpal tunnel syndrome, Cataracts, bilateral, Constipation, Depression, Diarrhea, GERD (gastroesophageal reflux disease), H/O gastric ulcer, Hyperlipidemia, Hypertension, Mumps, MVP (mitral valve prolapse), Recurrent UTI, Sinusitis, Tracheostomy in place [facility]), Ulcerative esophagitis, and Wellness examination. Social History:   reports that she has quit smoking. Her smoking use included cigarettes. She has a 50.00 pack-year smoking history. She has never used smokeless tobacco. She reports that she does not drink alcohol or use drugs. Family History:   Family History   Problem Relation Age of Onset   Gloriajean Seeds Cancer Mother         uterine    Heart Attack Mother     Heart Attack Father     Other Maternal Grandfather         foot gangrene    Other Paternal Grandmother         bleeding ulcers    Other Paternal Grandfather         lung cancer       Vitals:  /72   Pulse 78   Temp 97.6 °F (36.4 °C)   Resp 17   Ht 5' 2\" (1.575 m)   Wt 183 lb 9.6 oz (83.3 kg)   SpO2 97%   BMI 33.58 kg/m²   Temp (24hrs), Av.8 °F (36.6 °C), Min:96.6 °F (35.9 °C), Max:98.6 °F (37 °C)    No results for input(s): POCGLU in the last 72 hours. I/O (24Hr):     Intake/Output Summary (Last 24 hours) at 2020 1542  Last data filed at 2020 1300  Gross per 24 hour   Intake 1864.51 ml   Output 3470 ml   Net -1605.49 ml       Labs:  Hematology:  Recent Labs     20  0441 20  0937  20  0610  20  1915 20  0152 20  1020   WBC 13.1* 11.4*  --  7.8  --   --   --  7.1   RBC 4.72 4.85  --  3.57*  --   --   --  3.32*   HGB 12.8 12.9   < > 9.7*   < > 8.9* versus less likely neoplastic. Adjacent subcentimeter distal paraesophageal lymph node which may be reactive in nature. Recommend GI consultation and consider further assessment with endoscopy as clinically warranted. *Numerous incidental and chronic/postsurgical findings as detailed above. Xr Chest Portable    Result Date: 11/7/2020  Cardiomegaly and pulmonary vascular congestion. No focal consolidation. Follow-up PA and lateral chest radiographs may be helpful for further evaluation as warranted. Physical Examination:        General appearance:  alert, cooperative and no distress, elderly  female sitting up in bed  Mental Status:  oriented to person, place and time and normal affect  Lungs:  clear to auscultation bilaterally, normal effort  Heart:  regular rate and rhythm, no murmur  Abdomen:  soft, nontender, nondistended, normal bowel sounds, no masses, hepatomegaly, splenomegaly  Extremities:  no edema, redness, tenderness in the calves  Skin:  no gross lesions, rashes, induration    Assessment:        Hospital Problems           Last Modified POA    * (Principal) Acute upper GI bleed 11/7/2020 Yes    Depression 11/7/2020 Yes    Anxiety 11/7/2020 Yes    Urine retention 11/7/2020 Yes    Hiatal hernia 11/8/2020 Yes    UGIB (upper gastrointestinal bleed) 11/8/2020 Yes    Acute on chronic blood loss anemia 11/7/2020 Yes    H/O gastric ulcer 11/7/2020 Yes    Hyperlipidemia 11/7/2020 Yes    Hypertension 11/7/2020 Yes    Tobacco abuse 11/7/2020 Yes    S/P Nissen fundoplication (without gastrostomy tube) procedure 11/7/2020 Yes    Esophagitis determined by endoscopy 11/8/2020 Yes                Plan:        1. Appreciate GI input  2. Protonix 40 mg twice daily x8 weeks and Carafate 1 g 4 times daily x4 weeks  3. Follow-up with GI in the outpatient setting  4. Repeat EGD in 8 to 10 weeks  5. Following up with general surgery to discuss antireflux surgery  6. Advance diet this evening  7.  Remove DAKOTAH chu this evening/tomorrow AM, straight cath PRN  8. Recent history of robotic gastric fundoplication on 8/63/3210  Patient was seen on 7/7/2020 for upper GI bleed as well  9. Continue to monitor hemoglobins  10. Elevated troponin-likely type II mechanism  11. Anxiety-patient takes Elavil, BuSpar, Lexapro for anxiety, as needed Ativan added  12. Intractable nausea and vomiting-improved   13. Gastroparesis-patient is on Reglan 10 mg every 6 hours at home  14. Disposition: Advance diet this afternoon; monitor overnight. Patient tolerating diet in the morning and hemoglobin remained stable, will discharge back to F.     33 Dania Akins DO  11/9/2020  3:42 PM

## 2020-11-09 NOTE — PLAN OF CARE
Problem: Falls - Risk of:  Goal: Will remain free from falls  Description: Will remain free from falls  11/9/2020 0908 by Anurag Roldan RN  Outcome: Ongoing  11/8/2020 1933 by Matt Mora RN  Outcome: Ongoing  Goal: Absence of physical injury  Description: Absence of physical injury  11/9/2020 0908 by Anurag Roldan RN  Outcome: Ongoing  11/8/2020 1933 by Matt Mora RN  Outcome: Ongoing     Problem: Pain:  Goal: Pain level will decrease  Description: Pain level will decrease  Outcome: Ongoing  Goal: Control of acute pain  Description: Control of acute pain  Outcome: Ongoing  Goal: Control of chronic pain  Description: Control of chronic pain  Outcome: Ongoing     Problem: Anxiety:  Goal: Level of anxiety will decrease  Description: Level of anxiety will decrease  Outcome: Ongoing     Problem: Nausea/Vomiting:  Goal: Absence of nausea/vomiting  Description: Absence of nausea/vomiting  Outcome: Ongoing  Goal: Able to drink  Description: Able to drink  Outcome: Ongoing  Goal: Able to eat  Description: Able to eat  Outcome: Ongoing  Goal: Ability to achieve adequate nutritional intake will improve  Description: Ability to achieve adequate nutritional intake will improve  Outcome: Ongoing

## 2020-11-09 NOTE — PROGRESS NOTES
Pt NPO prior to procedure, upon return orders released and dinner order placed. Notified attending that she can not return to [facility] and [facility] until tomorrow d/t bed changes, also notified about pt's request for PT/OT. Pt resting in bed, will continue to monitor.

## 2020-11-09 NOTE — ANESTHESIA PRE PROCEDURE
Department of Anesthesiology  Preprocedure Note       Name:  Chris Akins   Age:  76 y.o.  :  1945                                          MRN:  [id number]         Date:  2020      Surgeon: Kennedi Vivar):  Armond Reddy MD    Procedure:  CASE IN OR WITH GI STAFF**\* EGD ESOPHAGOGASTRODUODENOSCOPY (N/A )    Department of Anesthesiology  Pre-Anesthesia Evaluation/Consultation         Name:  Chris Akins                                         Age:  76 y.o. MRN:  [id number]             Medications  No current facility-administered medications for this visit. No current outpatient medications on file.      Facility-Administered Medications Ordered in Other Visits   Medication Dose Route Frequency Provider Last Rate Last Dose    0.9 % sodium chloride infusion   Intravenous Continuous Migue Andrade  mL/hr at 20 1156      [MAR Hold] docusate sodium (COLACE) capsule 100 mg  100 mg Oral BID PRN Tom Charles APRN - CNP        [facility] Hold] 0.9 % sodium chloride infusion   Intravenous Continuous Tianna Fritz  mL/hr at 20 0841      [MAR Hold] sodium chloride flush 0.9 % injection 10 mL  10 mL Intravenous 2 times per day MARGOTH Ruth - CNP   10 mL at 20    [MAR Hold] sodium chloride flush 0.9 % injection 10 mL  10 mL Intravenous PRN Haley Boo APRN - CNP   10 mL at 20 1707    [MAR Hold] acetaminophen (TYLENOL) tablet 650 mg  650 mg Oral Q6H PRN Haley Boo APRN - CNP   650 mg at 20 1712    Or    [MAR Hold] acetaminophen (TYLENOL) suppository 650 mg  650 mg Rectal Q6H PRN Haley Boo APRN - CNP        [MAR Hold] promethazine (PHENERGAN) tablet 12.5 mg  12.5 mg Oral Q6H PRN Haley Boo APRN - CNP   12.5 mg at 20 0652    Or    [MAR Hold] ondansetron (ZOFRAN) injection 4 mg  4 mg Intravenous Q6H PRN MARGOTH Alba - CNP   4 mg at 20 0903    [MAR Hold] pantoprazole (PROTONIX) 80 mg in sodium chloride 0.9 % 100 mL infusion  8 mg/hr Intravenous Continuous Haley GAETANO HookANNA pillaiN - CNP 10 mL/hr at 11/09/20 0357 8 mg/hr at 11/09/20 0357    [MAR Hold] pantoprazole (PROTONIX) injection 40 mg  40 mg Intravenous Daily Haley GAETANO Monsterrosas, APRN - CNP        And    [MAR Hold] sodium chloride (PF) 0.9 % injection 10 mL  10 mL Intravenous Daily Haley GAETANO Boo, APRN - CNP        [MAR Hold] bisacodyl (DULCOLAX) suppository 10 mg  10 mg Rectal Daily PRN Chrystie Blotter, DO   10 mg at 11/08/20 1251    [MAR Hold] clonazePAM (KLONOPIN) tablet 0.25 mg  0.25 mg Oral BID Chrystie Blotter, DO   0.25 mg at 11/09/20 0851    [MAR Hold] escitalopram (LEXAPRO) tablet 20 mg  20 mg Oral Daily Chrystie Blotter, DO   20 mg at 11/09/20 0851    [MAR Hold] traZODone (DESYREL) tablet 100 mg  100 mg Oral Nightly Chrystie Blotter, DO   100 mg at 11/08/20 2023    [MAR Hold] busPIRone (BUSPAR) tablet 20 mg  20 mg Oral TID Chrystie Blotter, DO   20 mg at 11/09/20 0851    [MAR Hold] calcium carbonate (TUMS) chewable tablet 500 mg  1 tablet Oral TID PRN Chrystie Blotter, DO        [MAR Hold] docusate (COLACE) 50 MG/5ML liquid 100 mg  100 mg Per G Tube BID Chrystie Blotter, DO        [facility] Hold] ferrous sulfate (FE TABS 325) EC tablet 325 mg  325 mg Oral BID Chrystie Blotter, DO   325 mg at 11/09/20 0851    [MAR Hold] gabapentin (NEURONTIN) capsule 300 mg  300 mg Oral TID Chrystie Blotter, DO   300 mg at 11/09/20 0851    [MAR Hold] metoprolol succinate (TOPROL XL) extended release tablet 25 mg  25 mg Oral Daily Chrystie Blotter, DO   25 mg at 11/08/20 0910    [MAR Hold] QUEtiapine (SEROQUEL) tablet 100 mg  100 mg Oral Nightly Chrystie Blotter, DO   100 mg at 11/08/20 2023    [MAR Hold] sucralfate (CARAFATE) tablet 1 g  1 g Oral 4x Daily Chrystie Blotter, DO   1 g at 11/09/20 0851    [MAR Hold] atorvastatin (LIPITOR) tablet 20 mg  20 mg Oral Daily Chrystie Blotter, DO   Stopped at 11/07/20 1739    [MAR Hold] amitriptyline (ELAVIL) tablet 25 mg  25 mg Oral Nightly Alexa Piper Hall, DO   25 mg at 11/08/20 2023    [MAR Hold] metoclopramide (REGLAN) injection 10 mg  10 mg Intravenous Q6H Brittneyrosette Smithley, DO   10 mg at 11/09/20 0854    [MAR Hold] LORazepam (ATIVAN) injection 1 mg  1 mg Intravenous Q6H PRN Doneen Ho, DO   1 mg at 11/07/20 1609       Allergies   Allergen Reactions    Prednisone Anxiety    Compazine [Prochlorperazine Maleate]     Penicillin V Potassium     Penicillins Other (See Comments)     shock     Patient Active Problem List   Diagnosis    Frequency of urination    Back pain    GERD (gastroesophageal reflux disease)    MVP (mitral valve prolapse)    Depression    Anxiety    Urine retention    Dysphagia    Hiatal hernia    History of repair of hiatal hernia    Centrilobular emphysema (HCC)    Esophageal dilatation    UGIB (upper gastrointestinal bleed)    Acute on chronic blood loss anemia    Shock (HCC)    Fever    Critical illness polyneuropathy (HCC)    Gastric outlet obstruction    [facility] ([facility] [address]    H/O gastric ulcer    Hyperlipidemia    Hypertension    Tobacco abuse    Chronic respiratory failure with hypoxia (HCC)    Status post insertion of [facility]stomy ([facility] tube ([facility])    S/P Nissen fundoplication (without gastrostomy tube) procedure    Acute upper GI bleed    Acute blood loss anemia    Phlebitis after infusion    Esophagitis determined by endoscopy     Past Medical History:   Diagnosis Date    Anxiety     Arthritis     Back pain     Bronchitis     Caffeine use     8 coffee / day    Carpal tunnel syndrome     Cataracts, bilateral     Constipation     Depression     Diarrhea     GERD (gastroesophageal reflux disease)     H/O gastric ulcer     Hyperlipidemia     Hypertension     Mumps     MVP (mitral valve prolapse)     Dr. Cheri Irizarry in May 2019    Recurrent UTI     Dr. Layton Jaimes    Sinusitis     Trache[facility] in place [facility])     Ulcerative esophagitis     Wellness examination      Yesica-PCP seen in Jan 2020     Past Surgical History:   Procedure Laterality Date    APPENDECTOMY      CARPAL TUNNEL RELEASE      CHOLECYSTECTOMY, LAPAROSCOPIC  05/22/2020     LAPAROSCOPIC ROBOTIC CHOLECYSTECTOMY, PYLOROPLASTY     CHOLECYSTECTOMY, LAPAROSCOPIC N/A 5/22/2020    XI LAPAROSCOPIC ROBOTIC CHOLECYSTECTOMY, PYLOROPLASTY performed by Julián Gillette MD at [address]      EGD  3/17/2020         ERCP  05/21/2020    with stent insertion, balloon dilation, sphinctereotomy    ERCP  5/21/2020    ** CASE IN OR WITY GI STAFF** ERCP STENT INSERTION performed by Nicky Chen MD at [facility]    ERCP  5/21/2020    ** CASE IN OR WITH GI STAFF**ERCP SPHINCTER/PAPILLOTOMY performed by Nicky Chen MD at [facility]    ERCP  5/21/2020    ** CASE IN OR WITH GI STAFF**ERCP DILATION BALLOON performed by Nicky Chen MD at [address]    GASTRIC FUNDOPLICATION N/A 2/29/5560    XI LAPAROSCOPIC ROBOTIC HIATAL HERNIA REPAIR, CHERYL FUNDOPLICATION performed by Julián [facility]lette MD at [facility] N/A 3/27/2020    EGD PEG TUBE PLACEMENT performed by Julián Gillette MD at [address] [facility] [place]. CATH POWER PICC TRIPLE  3/4/2020         HYSTERECTOMY      Abdominal    JOINT REPLACEMENT Right     right hip    OVARY REMOVAL      RHINOPLASTY      TONSILLECTOMY      TOTAL HIP ARTHROPLASTY      TOTAL NEPHRECTOMY      atrophic from infections, age 13   Floydene Ada TRACHEOSTOMY N/A 3/27/2020    **ADD ON **WANTS 10:00AM **TRACHEOTOMY performed by Julián Gillette MD at [address] [facility] N/A 4/2/2020    TRACHEOTOMY EXCHANGE performed by Julián Gillette MD at [address] [facility] 3/17/2020    BEDSIDE EGD ESOPHAGOGASTRODUODENOSCOPY (ICU) performed by Julián Gillette MD at [address] [facility] 5/18/2020    EGD BIOPSY performed by Antoine Sharita Reid MD at [facility]    UPPER GASTROINTESTINAL ENDOSCOPY  2020    EGD DILATION BALLOON performed by Cielo Sprague MD at [facility] N/A 2020    ** CASE IN O.R. WITH GI STAFF** EGD ESOPHAGOGASTRODUODENOSCOPY performed by Mi Pendleton MD at [facility]     Social History     Tobacco Use    Smoking status: Former Smoker     Packs/day: 1.00     Years: 50.00     Pack years: 50.00     Types: Cigarettes    Smokeless tobacco: Never Used    Tobacco comment: quit 1 year ago    Substance Use Topics    Alcohol use: No    Drug use: No         Vital Signs (Current)   There were no vitals filed for this visit. Vital Signs Statistics (for past 48 hrs)     Temp  Av °F (36.7 °C)  Min: 96.6 °F (35.9 °C)   Min taken time: 20 1151  Max: 99 °F (37.2 °C)   Max taken time: 20 2000  Pulse  Av.2  Min: 59   Min taken time: 20 0903  Max: 103   Max taken time: 20 1900  Resp  Av.2  Min: 15   Min taken time: 20 1600  Max: 24   Max taken time: 20 2100  BP  Min: 73/47   Min taken time: 20 2218  Max: 145/70   Max taken time: 20 1332  MAP (mmHg)  Av.7  Min: 46   Min taken time: 20 2218  Max: 109   Max taken time: 20 2000  SpO2  Av.8 %  Min: 91 %   Min taken time: 20 2100  Max: 100 %   Max taken time: 20 0903  BP Readings from Last 3 Encounters:   20 (!) 118/53   20 125/69   20 109/87       BMI  There is no height or weight on file to calculate BMI.     CBC   Lab Results   Component Value Date    WBC 7.1 2020    RBC 3.32 2020    HGB 9.0 2020    HCT 31.4 2020    MCV 94.6 2020    RDW 16.3 2020     2020       CMP    Lab Results   Component Value Date     2020    K 3.8 2020     2020    CO2 21 2020    BUN 14 2020    CREATININE 0.93 2020    GFRAA >60 2020    LABGLOM 59 11/09/2020    GLUCOSE 83 11/09/2020    PROT 6.7 11/07/2020    CALCIUM 8.4 11/09/2020    BILITOT 0.19 11/07/2020    ALKPHOS 123 11/07/2020    AST 14 11/07/2020    ALT 17 11/07/2020       BMP    Lab Results   Component Value Date     11/09/2020    K 3.8 11/09/2020     11/09/2020    CO2 21 11/09/2020    BUN 14 11/09/2020    CREATININE 0.93 11/09/2020    CALCIUM 8.4 11/09/2020    GFRAA >60 11/09/2020    LABGLOM 59 11/09/2020    GLUCOSE 83 11/09/2020       POC Testing  No results for input(s): POCGLU, POCNA, POCK, POCCL, POCBUN, POCHEMO, POCHCT in the last 72 hours. Coags    Lab Results   Component Value Date    PROTIME 10.1 11/07/2020    INR 1.0 11/07/2020    APTT 24.7 11/07/2020       HCG (If Applicable) No results found for: PREGTESTUR, PREGSERUM, HCG, HCGQUANT     ABGs No results found for: PHART, PO2ART, VNX7NBW, WSW0JAJ, BEART, O8TMKSYZ     Type & Screen (If Applicable)  No results found for: [facility]    Radiology (If Applicable)    Cardiac Testing (If Applicable)     EKG (If Applicable) NSST changes          Medications prior to admission:   Prior to Admission medications    Medication Sig Start Date End Date Taking? Authorizing Provider   clonazePAM (KLONOPIN) 0.5 MG tablet Take 0.5 tablets by mouth 2 times daily for 60 days.  Taking 1/2 tablet BID 7/7/20 11/7/20  Tawana Garcia MD   pantoprazole (PROTONIX) 40 MG tablet Take 1 tablet by mouth 2 times daily 7/7/20   Tawana Garcia MD   ferrous sulfate (IRON 325) 325 (65 Fe) MG tablet Take 1 tablet by mouth 2 times daily 7/7/20   Tawana Garcia MD   amitriptyline (ELAVIL) 25 MG tablet Take 1 tablet by mouth nightly 7/14/20   Tawana Garcia MD   busPIRone (BUSPAR) 10 MG tablet Take 2 tablets by mouth 3 times daily 7/14/20   Tawana Garcia MD   escitalopram (LEXAPRO) 20 MG tablet Take 1 tablet by mouth daily 7/14/20   Tawana Garcia MD   midodrine (PROAMATINE) 5 MG tablet Take 1 tablet by mouth 3 times daily as needed (if SBP < 100) 6/1/20   Alvaro Butler MD metoprolol succinate (TOPROL XL) 25 MG extended release tablet Take 1 tablet by mouth daily 5/30/20   Roxanne Mendoza MD   QUEtiapine (SEROQUEL) 100 MG tablet Take 1 tablet by mouth nightly 4/6/20   Naya Andino MD   furosemide (LASIX) 20 MG tablet Take 1 tablet by mouth daily 4/6/20   Naya Andino MD   docusate (COLACE) 50 MG/5ML liquid 10 mLs by Per G Tube route 2 times daily 4/6/20   Naya Andino MD   metoclopramide (REGLAN) 5 MG/ML injection Infuse 2 mLs intravenously every 6 hours 4/6/20   Naya Andino MD   sucralfate (CARAFATE) 1 GM tablet Take 1 tablet by mouth 4 times daily 4/6/20   Naya Andino MD   traZODone (DESYREL) 100 MG tablet Take 1 tablet by mouth nightly 4/6/20   Naya Andino MD   RA VITAMIN D-3 50 MCG (2000 UT) CAPS take 1 capsule by mouth once daily 2/14/20   Historical Provider, MD   Oyster Shell 500 MG TABS Take 500 mg by mouth 2 times daily     Historical Provider, MD   Acetaminophen 500 MG CAPS Take 1 tablet by mouth as needed for Pain (follow OTC instructions)    Historical Provider, MD   calcium carbonate (TUMS) 500 MG chewable tablet Take 1 tablet by mouth 3 times daily as needed for Heartburn    Historical Provider, MD   gabapentin (NEURONTIN) 300 MG capsule Take 300 mg by mouth 3 times daily. Historical Provider, MD   simvastatin (ZOCOR) 40 MG tablet Take 40 mg by mouth nightly. Historical Provider, MD       Current medications:    No current facility-administered medications for this visit. No current outpatient medications on file.      Facility-Administered Medications Ordered in Other Visits   Medication Dose Route Frequency Provider Last Rate Last Dose    0.9 % sodium chloride infusion   Intravenous Continuous Migue Andrade  mL/hr at 11/09/20 1156      [MAR Hold] docusate sodium (COLACE) capsule 100 mg  100 mg Oral BID PRN MARGOTH Maddox - CNP        [facility] Hold] 0.9 % sodium chloride infusion   Intravenous Continuous Negron Rajas,  mL/hr at 11/09/20 0841      [MAR Hold] sodium chloride flush 0.9 % injection 10 mL  10 mL Intravenous 2 times per day Trevor Vallejo, APRN - CNP   10 mL at 11/08/20 2024    [MAR Hold] sodium chloride flush 0.9 % injection 10 mL  10 mL Intravenous PRN Haley Boo, APRN - CNP   10 mL at 11/08/20 1707    [MAR Hold] acetaminophen (TYLENOL) tablet 650 mg  650 mg Oral Q6H PRN Haley GAETANO Guillermoo, APRN - CNP   650 mg at 11/08/20 1712    Or    [MAR Hold] acetaminophen (TYLENOL) suppository 650 mg  650 mg Rectal Q6H PRN Haley GAETANO Guillermoo, APRN - CNP        [MAR Hold] promethazine (PHENERGAN) tablet 12.5 mg  12.5 mg Oral Q6H PRN Haley GAETANO Boo, APRN - CNP   12.5 mg at 11/08/20 0652    Or    [MAR Hold] ondansetron (ZOFRAN) injection 4 mg  4 mg Intravenous Q6H PRN Haley GAETANO Boo, APRN - CNP   4 mg at 11/07/20 0903    [MAR Hold] pantoprazole (PROTONIX) 80 mg in sodium chloride 0.9 % 100 mL infusion  8 mg/hr Intravenous Continuous Haley GAETANO Boo, APRN - CNP 10 mL/hr at 11/09/20 0357 8 mg/hr at 11/09/20 0357    [MAR Hold] pantoprazole (PROTONIX) injection 40 mg  40 mg Intravenous Daily Haley GAETANO Eagleo, APRN - CNP        And    [MAR Hold] sodium chloride (PF) 0.9 % injection 10 mL  10 mL Intravenous Daily Haley GAETANO Loosso, APRN - CNP        [MAR Hold] bisacodyl (DULCOLAX) suppository 10 mg  10 mg Rectal Daily PRN Octavio Rideau, DO   10 mg at 11/08/20 1251    [MAR Hold] clonazePAM (KLONOPIN) tablet 0.25 mg  0.25 mg Oral BID Octavio Rideau, DO   0.25 mg at 11/09/20 0851    [MAR Hold] escitalopram (LEXAPRO) tablet 20 mg  20 mg Oral Daily Octavio Rideau, DO   20 mg at 11/09/20 0851    [MAR Hold] traZODone (DESYREL) tablet 100 mg  100 mg Oral Nightly Octavio Rideau, DO   100 mg at 11/08/20 2023    [MAR Hold] busPIRone (BUSPAR) tablet 20 mg  20 mg Oral TID Octavio Rideau, DO   20 mg at 11/09/20 0851    [MAR Hold] calcium carbonate (TUMS)  Fever R50.9    Critical illness polyneuropathy (HCC) G62.81    Gastric outlet obstruction K31.1    Trache[facility] in place ([facility] [address] Z93.0    H/O gastric ulcer Z87.19    Hyperlipidemia E78.5    Hypertension I10    Tobacco abuse Z72.0    Chronic respiratory failure with hypoxia (HCC) J96.11    Status post insertion of percutaneous endoscopic gastrostomy (PEG) tube ([facility]) Z93.1    S/P Nissen fundoplication (without gastrostomy tube) procedure Z98.890    Acute upper GI bleed K92.2    Acute blood loss anemia D62    Phlebitis after infusion T80. 1XXA, I80.9    Esophagitis determined by endoscopy K20.90       Past Medical History:        Diagnosis Date    Anxiety     Arthritis     Back pain     Bronchitis     Caffeine use     8 coffee / day    Carpal tunnel syndrome     Cataracts, bilateral     Constipation     Depression     Diarrhea     GERD (gastroesophageal reflux disease)     H/O gastric ulcer     Hyperlipidemia     Hypertension     Mumps     MVP (mitral valve prolapse)     Dr. Jared Sheth in May 2019    Recurrent UTI     Dr. Ritu Sorto    Sinusitis     Tr[facility] in place [facility])     Ulcerative esophagitis     Wellness examination     Dr. Rissa Oates seen in Jan 2020       Past Surgical History:        Procedure Laterality Date    APPENDECTOMY      CARPAL TUNNEL RELEASE      CHOLECYSTECTOMY, LAPAROSCOPIC  05/22/2020     LAPAROSCOPIC ROBOTIC CHOLECYSTECTOMY, PYLOROPLASTY     CHOLECYSTECTOMY, LAPAROSCOPIC N/A 5/22/2020    XI LAPAROSCOPIC ROBOTIC CHOLECYSTECTOMY, PYLOROPLASTY performed by Monika Whaley MD at [address] [place] [address]      EGD  3/17/2020         ERCP  05/21/2020    with stent insertion, balloon dilation, sphinctereotomy    ERCP  5/21/2020    ** CASE IN OR WITY GI STAFF** ERCP STENT INSERTION performed by [facility]emarie Her MD at [facility]    ERCP  5/21/2020    ** CASE IN OR WITH GI STAFF**ERCP SPHINCTER/PAPILLOTOMY performed by Annemarie Her MD at [facility]    ERCP  5/21/2020    ** CASE IN OR WITH GI STAFF**ERCP DILATION BALLOON performed by Lonell Simmonds, MD at [address]      patricia IOL    GASTRIC FUNDOPLICATION N/A 6/23/4930    XI LAPAROSCOPIC ROBOTIC HIATAL HERNIA REPAIR, CHERYL FUNDOPLICATION performed by Kari Agosto MD at [facility] N/A 3/27/2020    EGD PEG TUBE PLACEMENT performed by Kari Agosto MD at [address] [facility]. CATH POWER PICC TRIPLE  3/4/2020         HYSTERECTOMY      Abdominal    JOINT REPLACEMENT Right     right hip    OVARY REMOVAL      RHINOPLASTY      TONSILLECTOMY      TOTAL HIP ARTHROPLASTY      TOTAL NEPHRECTOMY      atrophic from infections, age 13   Lennox Mayotte TRACHEOSTOMY N/A 3/27/2020    **ADD ON **WANTS 10:00AM **TRACHEOTOMY performed by Kari Agosto MD at [address] N/A 4/2/2020    TRACHEOTOMY EXCHANGE performed by Kari Agosto MD at [address] [facility] 3/17/2020    BEDSIDE EGD ESOPHAGOGASTRODUODENOSCOPY (ICU) performed by Kari Agosto MD at [facility]    UPPER GASTROINTESTINAL ENDOSCOPY N/A 5/18/2020    EGD BIOPSY performed by Lonell Simmonds, MD at [address] [place] [address]  5/18/2020    EGD DILATION BALLOON performed by Lonell Simmonds, MD at [address] [place] [address] N/A 7/6/2020    ** CASE IN O.R. WITH GI STAFF** EGD ESOPHAGOGASTRODUODENOSCOPY performed by Michelet Ortiz MD at [facility]       Social History:    Social History     Tobacco Use    Smoking status: Former Smoker     Packs/day: 1.00     Years: 50.00     Pack years: 50.00     Types: Cigarettes    Smokeless tobacco: Never Used    Tobacco comment: quit 1 year ago    Substance Use Topics    Alcohol use: No                                Counseling given: Not Answered  Comment: quit 1 year ago       Vital Signs (Current):    There were no vitals filed for this visit.                                           BP Readings from Last 3 Encounters:   11/09/20 (!) 118/53   07/07/20 125/69   07/06/20 109/87       NPO Status:  MN     Vomited blood yesterday                                                                          BMI:   Wt Readings from Last 3 Encounters:   11/07/20 183 lb 9.6 oz (83.3 kg)   07/07/20 190 lb 4.1 oz (86.3 kg)   05/31/20 186 lb 1.1 oz (84.4 kg)     There is no height or weight on file to calculate BMI.    CBC:   Lab Results   Component Value Date    WBC 7.1 11/09/2020    RBC 3.32 11/09/2020    HGB 9.0 11/09/2020    HCT 31.4 11/09/2020    MCV 94.6 11/09/2020    RDW 16.3 11/09/2020     11/09/2020       CMP:   Lab Results   Component Value Date     11/09/2020    K 3.8 11/09/2020     11/09/2020    CO2 21 11/09/2020    BUN 14 11/09/2020    CREATININE 0.93 11/09/2020    GFRAA >60 11/09/2020    LABGLOM 59 11/09/2020    GLUCOSE 83 11/09/2020    PROT 6.7 11/07/2020    CALCIUM 8.4 11/09/2020    BILITOT 0.19 11/07/2020    ALKPHOS 123 11/07/2020    AST 14 11/07/2020    ALT 17 11/07/2020       POC Tests:   No results for input(s): POCGLU, POCNA, POCK, POCCL, POCBUN, POCHEMO, POCHCT in the last 72 hours.     Coags:   Lab Results   Component Value Date    PROTIME 10.1 11/07/2020    INR 1.0 11/07/2020    APTT 24.7 11/07/2020       HCG (If Applicable): No results found for: PREGTESTUR, PREGSERUM, HCG, HCGQUANT     ABGs: No results found for: PHART, PO2ART, ELI3MWP, LSA5EMF, BEART, T7ZTRVED     Type & Screen (If Applicable):  No results found for: LABABO, [address]    Anesthesia Evaluation  Patient summary reviewed no history of anesthetic complications:   Airway: Mallampati: II  TM distance: >3 FB   Neck ROM: full  Comment: Trach in place  Mouth opening: > = 3 FB Dental:    (+) partials  Comment: Very poor dentition     Pulmonary:Negative Pulmonary ROS and normal exam    (+) pneumonia: no interval change,  COPD: moderate and severe,  current smoker                          ROS comment: Trach removed months ago   Cardiovascular:    (+) hypertension: no interval change,       ECG reviewed  Rhythm: regular  Rate: normal  Echocardiogram reviewed         Beta Blocker:  Not on Beta Blocker      ROS comment: Left ventricle is normal in size, increased septal wall thickness, global  left ventricular systolic function is hyperdynamic, calculated ejection  fraction is 68%. Mild mitral regurgitation. Mild tricuspid regurgitation. Estimated right ventricular systolic pressure is 45 mmHg, suggesting  pulmonary HTN. No significant pericardial effusion is seen.  -cp,syn,pnd     Neuro/Psych:   (+) neuromuscular disease:, psychiatric history:depression/anxiety              ROS comment: neuropathy GI/Hepatic/Renal:   (+) hiatal hernia, GERD: no interval change, PUD,          ROS comment: persistant vomiting,GI bleed. Endo/Other: Negative Endo/Other ROS   (+) blood dyscrasia: anemia:., .                 Abdominal:           Vascular:                                          Anesthesia Plan      MAC     ASA 4     (Asa 4)  Induction: intravenous. Anesthetic plan and risks discussed with patient. Use of blood products discussed with patient whom consented to blood products. Plan discussed with CRNA.                   Pasha Goodrich MD   11/9/2020

## 2020-11-09 NOTE — PROGRESS NOTES
Called [facility] 2 JUAREZ Quiles to inform that patient's folder/chart is here in [place] but is empty, no paper works inside. Also verified if patient needs to have Echo before going back to [facility] TERM ACUTE [facility] 2004 after EGD. Wing Ewing RN stated that this patient does not need an Echo.

## 2020-11-09 NOTE — PROGRESS NOTES
Occupational Therapy   Occupational Therapy Initial Assessment  Date: 2020   Patient Name: Irma Monson  MRN: [id number]     : 1945    Date of Service: 2020    Discharge Recommendations:  Patient would benefit from continued therapy after discharge  OT Equipment Recommendations  Equipment Needed: (CTA)  Copied from Emergency Medicine:  Irma Monson is a 76 y.o. female who presents to the emergency department via EMS with multiple episodes of coffee-ground like emesis starting around 1 AM.  Patient has had multiple GI bleeds in the past due to ulcers. Patient is currently on a acid inhibitor as well as Carafate at home. She states that she is felt extremely nauseated as well as had crampy abdominal pain as well as chest pain. Denies any lightheadedness or dizziness. Denies any shortness of breath, back pain, numbness or tingling. Denies any syncope. She is not currently on any antiplatelet or anticoagulation medications. Denies any dark tarry or bloody stools. Denies any habitual NSAID use. Denies any alcohol use.   Pt had EGD this afternoon  Assessment    Pt lying supine in bed upon entrance to room. Pt completed bed mobility with min assistance. Pt sat at eob 15 minutes with good unsupported sitting balance with forward flexed posture. Sit to stand with min assistance. Pt completed dynamic mob to chair with min assistance. Please refer to below assist levels for adl completion. Pt ed on OT POC, safety awareness tech, proper hand placement for transfers, and energy conservation tech with proper breathing tech with fair return. Pt tends to hold breath with exertion, ed pt on pursed lip breathing with fair return. Pt retired supine in bed with call light and phone in reach, bed alarm activated. All needs met upon exit. Performance deficits / Impairments: Decreased functional mobility ; Decreased safe awareness;Decreased endurance  Treatment Diagnosis: Acute Upper GI Bleed  Prognosis: Fair  Decision Making: Medium Complexity  OT Education: OT Role;Plan of Care  REQUIRES OT FOLLOW UP: Yes  Activity Tolerance  Activity Tolerance: Patient limited by fatigue  Activity Tolerance: limited by anxiety. Pt was told by shaan that SNF could not accept pt until Thursday due to foom changes at facility. Pt upset with news. Emotional support provided along with active listening applied. Pt appreciative. Safety Devices  Safety Devices in place: Yes  Type of devices: Bed alarm in place;Call light within reach; Left in bed  Restraints  Initially in place: No         Patient Diagnosis(es): The encounter diagnosis was UGIB (upper gastrointestinal bleed). has a past medical history of Anxiety, Arthritis, Back pain, Bronchitis, Caffeine use, Carpal tunnel syndrome, Cataracts, bilateral, Constipation, Depression, Diarrhea, GERD (gastroesophageal reflux disease), H/O gastric ulcer, Hyperlipidemia, Hypertension, Mumps, MVP (mitral valve prolapse), Recurrent UTI, Sinusitis, Tracheostomy in place [facility]), Ulcerative esophagitis, and Wellness examination. has a past surgical history that includes Hysterectomy; total nephrectomy; Tonsillectomy; Appendectomy; Cystoscopy; Ovary removal; Tubal ligation; rhinoplasty; Carpal tunnel release; Hand surgery; Total hip arthroplasty; eye surgery; Colonoscopy; joint replacement (Right); Gastric fundoplication (N/A, 9/56/6934); hc cath power picc triple (3/4/2020); EGD (3/17/2020); Upper gastrointestinal endoscopy (N/A, 3/17/2020); tracheostomy (N/A, 3/27/2020); Gastrostomy tube placement (N/A, 3/27/2020); tracheostomy (N/A, 4/2/2020); Upper gastrointestinal endoscopy (N/A, 5/18/2020); Upper gastrointestinal endoscopy (5/18/2020); ERCP (05/21/2020); Cholecystectomy, laparoscopic (05/22/2020); ERCP (5/21/2020); ERCP (5/21/2020); ERCP (5/21/2020); Cholecystectomy, laparoscopic (N/A, 5/22/2020); and Upper gastrointestinal endoscopy (N/A, 7/6/2020).     Treatment Diagnosis: Acute Upper GI Bleed      Restrictions  Restrictions/Precautions  Restrictions/Precautions: Bedrest with Bathroom Privileges, Fall Risk  Required Braces or Orthoses?: No    Subjective   General  Patient assessed for rehabilitation services?: Yes  Family / Caregiver Present: No  Patient Currently in Pain: No  Pain Assessment  Pain Assessment: 0-10  Pain Level: 0  Vital Signs  Patient Currently in Pain: No  Oxygen Therapy  O2 Device: Nasal cannula  O2 Flow Rate (L/min): 2 L/min  Patient Observation  Observations: Pt reports no 02 use at [facility]  Social/Functional History  Social/Functional History  Lives With: Other (comment)  Type of Home: Facility(Pt has been at [facility] since spring 2019.  Pt is a long term resident)  Home Layout: One level  Home Access: Level entry  Bathroom Shower/Tub: Curtain, Walk-in shower  Bathroom Toilet: Handicap height  Bathroom Equipment: Tub transfer bench  Bathroom Accessibility: Wheelchair accessible  Home Equipment: Rolling walker  Receives Help From: Personal care attendant  ADL Assistance: Needs assistance  [address]: Needs assistance  Homemaking Responsibilities: No  Ambulation Assistance: Needs assistance  Transfer Assistance: Needs assistance  Active : No  Patient's  Info: son drives; Karli Hiro service at facility for dr underwood  Mode of Transportation: Car, Raul Broad  Occupation: Retired  Leisure & Hobbies: Play game on i pad, facebook, watch tv, talk to neighbor, go outside  Additional Comments: Pt reports having PT 3 x week and restorative therapy 5 x week     Objective   Vision: Impaired  Vision Exceptions: Wears glasses at all times(glasses left at facility due to leaving emergently)  Hearing: Within functional limits    Orientation  Overall Orientation Status: Within Functional Limits     Balance  Sitting Balance: Supervision  Standing Balance: Contact guard assistance  Standing Balance  Time: ~3 min  Activity: static  Functional Mobility  Functional - Mobility Device: Rolling Walker  Activity: Other(in room)  Assist Level: Minimal assistance  ADL  Feeding: Independent;Setup(pt I with hand to mouth patterning. pt with increased difficulty removing wrapper from spoon, but able to complete. pt unable to open jello container.)  Grooming: Independent;Setup  UE Bathing: Minimal assistance;Setup; Increased time to complete(assist for back)  LE Bathing: Moderate assistance;Setup; Increased time to complete  UE Dressing: Minimal assistance;Setup; Increased time to complete(assist to tie gown in back)  LE Dressing: Moderate assistance;Setup; Increased time to complete  Toileting: Minimal assistance;Setup; Increased time to complete  Tone RUE  RUE Tone: Normotonic  Tone LUE  LUE Tone: Normotonic  Coordination  Movements Are Fluid And Coordinated: Yes     Bed mobility  Rolling to Left: Contact guard assistance  Supine to Sit: Minimal assistance  Sit to Supine: Minimal assistance  Transfers  Stand Step Transfers: Minimal assistance(with r walker)  Sit to stand: Minimal assistance(with r walker)  Stand to sit: Contact guard assistance(with r walker)     Cognition  Overall Cognitive Status: Exceptions  Following Commands: Follows one step commands consistently; Follows multistep commands with increased time  Memory: Decreased short term memory  Sequencing: Requires cues for some     Sensation  Overall Sensation Status: Impaired  Light Touch: Partial deficits in the LUE(pt reports numbness/tingling L hand that comes and goes.  \"It feels like it's asleep\")      LUE PROM (degrees)  LUE PROM: WFL  LUE AROM (degrees)  LUE AROM : WFL  Left Hand PROM (degrees)  Left Hand PROM: WFL  Left Hand AROM (degrees)  Left Hand AROM: WFL  RUE PROM (degrees)  RUE PROM: WFL  RUE AROM (degrees)  RUE AROM : WFL  Right Hand PROM (degrees)  Right Hand PROM: WFL  Right Hand AROM (degrees)  Right Hand AROM: WFL  LUE Strength  Gross LUE Strength: WFL  L Hand General: 4-/5  LUE Strength Comment: 4-/5 overall muscle strength  RUE Strength  Gross RUE Strength: WFL  R Hand General: 4-/5  RUE Strength Comment: 4-/5 overall muscle strength     Plan   Plan  Times per week: 3 x week  Current Treatment Recommendations: Patient/Caregiver Education & Training, Endurance Training, Functional Mobility Training, Safety Education & Training, Self-Care / ADL         AM-PAC Score  AM-PAC Inpatient Daily Activity Raw Score: 18 (11/09/20 1540)  AM-PAC Inpatient ADL T-Scale Score : 38.66 (11/09/20 1540)  ADL Inpatient CMS 0-100% Score: 46.65 (11/09/20 1540)  ADL Inpatient CMS G-Code Modifier : CK (11/09/20 1540)    Goals  Short term goals  Time Frame for Short term goals: Pt will, by discharge:  Short term goal 1: Dem I with bed mobility  Short term goal 2: Dem sba for functional transfers  Short term goal 3: Dem sba for dynamic mob with r walker for adl completion  Short term goal 4: Dem good safety awareness tech for all transfers/mobility  Short term goal 5: Dem 5 min static standing with sba for hygiene purposes       Therapy Time   Individual Concurrent Group Co-treatment   Time In 1446         Time Out 1530         Minutes 44         Timed Code Treatment Minutes: [address], OTR/L

## 2020-11-09 NOTE — OP NOTE
Operative Note      Patient: Felton Diop  YOB: 1945  MRN: [id number]    Date of Procedure: 11/9/2020    Pre-Op Diagnosis: Coffee ground emesis    Post-Op Diagnosis: Same  Severe esophagitis  Patulous EGJ  Bile reflux gastropathy       Procedure(s):  EGD DIAGNOSTIC ONLY    Surgeon(s):  Franky Cook MD    Assistant:   First Assistant: Fede Stearns RN    Anesthesia: Monitor Anesthesia Care    Estimated Blood Loss (mL): None    Complications: None    Specimens:   * No specimens in log *    Implants:  * No implants in log *      Drains:   Gastrostomy/Enterostomy/Jejunostomy Percutaneous Endoscopic Gastrostomy (PEG) LLQ 1 20 fr (Active)       Urethral Catheter (Active)   Catheter Indications Acute urinary retention/obstruction 11/09/20 0800   Site Assessment No urethral drainage 11/09/20 0800   Urine Color Yellow 11/09/20 0800   Urine Appearance Sediment 11/08/20 2001   Output (mL) 2300 mL 11/09/20 0800       Findings:   · Tortuous esophagus. · LA grade D erosive esophagitis extending from 27 to 32 cm. · Irregular Z-line was noted at 32 cm. · Patulous esophageal gastric junction. Bile was seen refluxing up into the distal esophagus. · Large hiatal hernia extending from 32 to 37 cm. No Kirill erosions. · The retroflexed view of the fundus and cardia showed no evidence of prior mentioned fundoplication. · Moderate amount of bile in the entire stomach. This was cleared using scope suctioning. · Congested mucosa in the entire stomach. · The pylorus was widely patent. The mucosa at the pylorus was characterized by erythema, granularity and congestion. · The entire examined duodenum up to the third portion appeared normal.    Recommendations  1. Start Carafate 1 g 3 times daily for 4 weeks. 2. Continue pantoprazole 40 mg twice daily for 8 weeks. 3. Follow an antireflux lifestyle. 4. We will need a repeat EGD in 8 to 10 weeks to check for healing of esophagitis.   5. Follow-up with general surgery to discuss antireflux surgery. 6. Start soft diet and advance as tolerated. 7. If patient is tolerating diet she can be discharged home and follow-up in GI clinic in 2 weeks. 8. No further recommendations from GI standpoint. GI will sign off. Detailed Description of Procedure:   Informed consent was obtained from the patient after explanation of the procedure including indications, description of the procedure,  benefits and possible risks and complications of the procedure, and alternatives. Questions were answered. The patient's history was reviewed and a directed physical examination was performed prior to the procedure. Patient was monitored throughout the procedure with pulse oximetry and periodic assessment of vital signs. Patient was sedated as noted above. With the patient in the left lateral decubitus position, the Olympus videoendoscope was placed in the patient's mouth and under direct visualization passed into the esophagus. Visualization of the esophagus, stomach, and duodenum was performed during both introduction and withdrawal of the endoscope and retroflexed view of the proximal stomach was obtained. The scope was passed to the 3rd portion of the duodenum. The patient tolerated the procedure well and was taken to the recovery area in good condition. The patient  was taken to the recovery area in good condition.     Electronically signed by Marice Leyden, MD on 11/9/2020 at 1:01 PM

## 2020-11-10 ENCOUNTER — TELEPHONE (OUTPATIENT)
Dept: GASTROENTEROLOGY | Age: 75
End: 2020-11-10

## 2020-11-10 VITALS
TEMPERATURE: 99.5 F | WEIGHT: 183 LBS | RESPIRATION RATE: 19 BRPM | DIASTOLIC BLOOD PRESSURE: 68 MMHG | HEART RATE: 80 BPM | HEIGHT: 62 IN | OXYGEN SATURATION: 97 % | BODY MASS INDEX: 33.68 KG/M2 | SYSTOLIC BLOOD PRESSURE: 131 MMHG

## 2020-11-10 PROBLEM — R33.9 URINE RETENTION: Status: RESOLVED | Noted: 2018-05-01 | Resolved: 2020-11-10

## 2020-11-10 PROBLEM — K92.2 ACUTE UPPER GI BLEED: Status: RESOLVED | Noted: 2020-07-05 | Resolved: 2020-11-10

## 2020-11-10 LAB
ALBUMIN SERPL-MCNC: 2.9 G/DL (ref 3.5–5.2)
ANION GAP SERPL CALCULATED.3IONS-SCNC: 9 MMOL/L (ref 9–17)
BUN BLDV-MCNC: 8 MG/DL (ref 8–23)
BUN/CREAT BLD: ABNORMAL (ref 9–20)
CALCIUM SERPL-MCNC: 8.6 MG/DL (ref 8.6–10.4)
CHLORIDE BLD-SCNC: 110 MMOL/L (ref 98–107)
CO2: 24 MMOL/L (ref 20–31)
CREAT SERPL-MCNC: 0.81 MG/DL (ref 0.5–0.9)
GFR AFRICAN AMERICAN: >60 ML/MIN
GFR NON-AFRICAN AMERICAN: >60 ML/MIN
GFR SERPL CREATININE-BSD FRML MDRD: ABNORMAL ML/MIN/{1.73_M2}
GFR SERPL CREATININE-BSD FRML MDRD: ABNORMAL ML/MIN/{1.73_M2}
GLUCOSE BLD-MCNC: 95 MG/DL (ref 70–99)
HCT VFR BLD CALC: 31.2 % (ref 36.3–47.1)
HEMOGLOBIN: 9.3 G/DL (ref 11.9–15.1)
MAGNESIUM: 1.7 MG/DL (ref 1.6–2.6)
MCH RBC QN AUTO: 27.3 PG (ref 25.2–33.5)
MCHC RBC AUTO-ENTMCNC: 29.8 G/DL (ref 28.4–34.8)
MCV RBC AUTO: 91.5 FL (ref 82.6–102.9)
NRBC AUTOMATED: 0 PER 100 WBC
PDW BLD-RTO: 16.1 % (ref 11.8–14.4)
PHOSPHORUS: 3.1 MG/DL (ref 2.6–4.5)
PLATELET # BLD: 191 K/UL (ref 138–453)
PMV BLD AUTO: 10.5 FL (ref 8.1–13.5)
POTASSIUM SERPL-SCNC: 3.8 MMOL/L (ref 3.7–5.3)
RBC # BLD: 3.41 M/UL (ref 3.95–5.11)
SODIUM BLD-SCNC: 143 MMOL/L (ref 135–144)
WBC # BLD: 7 K/UL (ref 3.5–11.3)

## 2020-11-10 PROCEDURE — 80069 RENAL FUNCTION PANEL: CPT

## 2020-11-10 PROCEDURE — 2580000003 HC RX 258: Performed by: INTERNAL MEDICINE

## 2020-11-10 PROCEDURE — 2700000000 HC OXYGEN THERAPY PER DAY

## 2020-11-10 PROCEDURE — 36415 COLL VENOUS BLD VENIPUNCTURE: CPT

## 2020-11-10 PROCEDURE — 6370000000 HC RX 637 (ALT 250 FOR IP): Performed by: NURSE PRACTITIONER

## 2020-11-10 PROCEDURE — 6360000002 HC RX W HCPCS: Performed by: INTERNAL MEDICINE

## 2020-11-10 PROCEDURE — 6370000000 HC RX 637 (ALT 250 FOR IP): Performed by: INTERNAL MEDICINE

## 2020-11-10 PROCEDURE — 94761 N-INVAS EAR/PLS OXIMETRY MLT: CPT

## 2020-11-10 PROCEDURE — 83735 ASSAY OF MAGNESIUM: CPT

## 2020-11-10 PROCEDURE — 99239 HOSP IP/OBS DSCHRG MGMT >30: CPT | Performed by: INTERNAL MEDICINE

## 2020-11-10 PROCEDURE — 85027 COMPLETE CBC AUTOMATED: CPT

## 2020-11-10 RX ADMIN — METOPROLOL SUCCINATE 25 MG: 25 TABLET, FILM COATED, EXTENDED RELEASE ORAL at 08:56

## 2020-11-10 RX ADMIN — METOCLOPRAMIDE 10 MG: 5 INJECTION, SOLUTION INTRAMUSCULAR; INTRAVENOUS at 03:20

## 2020-11-10 RX ADMIN — BUSPIRONE HYDROCHLORIDE 20 MG: 10 TABLET ORAL at 13:35

## 2020-11-10 RX ADMIN — METOCLOPRAMIDE 10 MG: 5 INJECTION, SOLUTION INTRAMUSCULAR; INTRAVENOUS at 08:55

## 2020-11-10 RX ADMIN — SUCRALFATE 1 G: 1 TABLET ORAL at 08:55

## 2020-11-10 RX ADMIN — PANTOPRAZOLE SODIUM 40 MG: 40 TABLET, DELAYED RELEASE ORAL at 08:55

## 2020-11-10 RX ADMIN — CLONAZEPAM 0.25 MG: 0.5 TABLET ORAL at 08:56

## 2020-11-10 RX ADMIN — BENZOCAINE: 100 GEL TOPICAL at 14:24

## 2020-11-10 RX ADMIN — SUCRALFATE 1 G: 1 TABLET ORAL at 12:00

## 2020-11-10 RX ADMIN — ONDANSETRON 4 MG: 2 INJECTION INTRAMUSCULAR; INTRAVENOUS at 12:08

## 2020-11-10 RX ADMIN — ESCITALOPRAM OXALATE 20 MG: 10 TABLET ORAL at 08:56

## 2020-11-10 RX ADMIN — BUSPIRONE HYDROCHLORIDE 20 MG: 10 TABLET ORAL at 08:55

## 2020-11-10 RX ADMIN — Medication 10 ML: at 12:09

## 2020-11-10 RX ADMIN — SODIUM CHLORIDE: 9 INJECTION, SOLUTION INTRAVENOUS at 11:57

## 2020-11-10 RX ADMIN — GABAPENTIN 300 MG: 300 CAPSULE ORAL at 13:35

## 2020-11-10 RX ADMIN — ONDANSETRON 4 MG: 2 INJECTION INTRAMUSCULAR; INTRAVENOUS at 00:55

## 2020-11-10 RX ADMIN — GABAPENTIN 300 MG: 300 CAPSULE ORAL at 08:56

## 2020-11-10 NOTE — TELEPHONE ENCOUNTER
KARINA JAIME [facility] heart center called to make a f/u appt with pt. Pt was put on a recall to receive letter and call us for f/u appt.  The heart center wonders if pt should not be seen before this, could you please check out and let us know if she needs to be seen sooner

## 2020-11-10 NOTE — PROGRESS NOTES
[facility]  Office: [address], DO, Pablo Kras, DO, Rukhsana Kole, DO, Tyra Erica Blood, DO, Che Royal MD, Mariam De La Torre MD, Tasha Loco MD, John Hough MD, Akbar Moreno MD, Ede Aguilar MD, Jono Vasquez MD, Barbara Up MD, Kvng Freitas MD, Aria Pruett DO, Eliana Nickerson MD, [place]ro Ware MD, Veronika Hernández, DO, Mallory Buchanan MD,  Chris Jain, DO, Cyril Limon MD, Melissa Hinton MD, Farooq Mcleod, [facility], [facility], CNP, Surya Diaz, CNP, Yolanda Colin, CNS, Isaiah Reynolds, CNP, Addy Section, CNP, Prince Mancia, CNP, Jessenia Cunningham, CNP, Kaylynn Soldalex, CNP, Preethi Frias PA-C, Terrence Frias [facility], Zev Fernandez, CNP, Orysia Soda, CNP, Wynonia Husbands, CNP, Colleyasmin Finnegan, CNP, Mateo Brooks, [address] [place] [address]    Progress Note    11/10/2020    9:19 AM    Name:   Diana Shah  MRN:     [id number]     Acct:      [de-identified]   Room:   2004/2004-01   Day:  3  Admit Date:  11/7/2020  4:32 AM    PCP:   Jason Pemberton MD  Code Status:  Full Code    Subjective:     C/C:   Chief Complaint   Patient presents with    GI Bleeding     woke up having coffee ground emesis around 0100     Interval History Status: improved. Patient seen and examined this morning. No acute events overnight. Tolerating diet. No nausea or vomiting. Remains very anxious. Hemoglobin improving. Brief History:     Diana Shah is a 76 y.o. Non-/non  female who presents with GI Bleeding (woke up having coffee ground emesis around 0100)   and is admitted to the hospital for the management of Acute upper GI bleed.     Patient states she woke up last night with acute episode of coffee-ground emesis which has been worsening throughout the day. Patient has had 4-5 episodes before presenting in the emergency department.   Patient states he has a history of upper GI bleed previously seen on July 2020.  Patient also has a history of robotic fundoplication on 8/78/4897. Patient underwent EGD and found to have severe esophagitis patient was started on twice daily PPI, patient also noted to have an aspiration pneumonia which was treated with Zyvox. Patient denies any lightheadedness dizziness, she does admit that she is anxious and this can exacerbate her nausea and vomiting. No shortness of breath, no chest pain. Pt also with history of a prolonged hospitalization earlier this year with GI bleeding and acute hypoxic resp failure requiring mechanical ventilation, trach, and peg placement.    - EGD with the following findings/recommendations. Findings:   · Tortuous esophagus. · LA grade D erosive esophagitis extending from 27 to 32 cm. · Irregular Z-line was noted at 32 cm. · Patulous esophageal gastric junction. Bile was seen refluxing up into the distal esophagus. · Large hiatal hernia extending from 32 to 37 cm. No Kirill erosions. · The retroflexed view of the fundus and cardia showed no evidence of prior mentioned fundoplication. · Moderate amount of bile in the entire stomach. This was cleared using scope suctioning. · Congested mucosa in the entire stomach. · The pylorus was widely patent. The mucosa at the pylorus was characterized by erythema, granularity and congestion. · The entire examined duodenum up to the third portion appeared normal.     Recommendations  1. Start Carafate 1 g 3 times daily for 4 weeks. 2. Continue pantoprazole 40 mg twice daily for 8 weeks. 3. Follow an antireflux lifestyle. 4. We will need a repeat EGD in 8 to 10 weeks to check for healing of esophagitis. 5. Follow-up with general surgery to discuss antireflux surgery. 6. Start soft diet and advance as tolerated. 7. If patient is tolerating diet she can be discharged home and follow-up in GI clinic in 2 weeks. 8. No further recommendations from GI standpoint. GI will sign off.     Review of Systems: Constitutional:  negative for chills, fevers, sweats  Respiratory:  negative for cough, dyspnea on exertion, shortness of breath, wheezing  Cardiovascular:  negative for chest pain, chest pressure/discomfort, lower extremity edema, palpitations  Gastrointestinal: reports nausea this morning with eating turkey sausage, but not with eggs or toast; negative for abdominal pain, constipation, diarrhea, nausea, vomiting  Neurological:  negative for dizziness, headache    Medications: Allergies: Allergies   Allergen Reactions    Prednisone Anxiety    Compazine [Prochlorperazine Maleate]     Penicillin V Potassium     Penicillins Other (See Comments)     shock       Current Meds:   Scheduled Meds:    pantoprazole  40 mg Oral BID AC    sodium chloride flush  10 mL Intravenous 2 times per day    clonazePAM  0.25 mg Oral BID    escitalopram  20 mg Oral Daily    traZODone  100 mg Oral Nightly    busPIRone  20 mg Oral TID    docusate  100 mg Per G Tube BID    gabapentin  300 mg Oral TID    metoprolol succinate  25 mg Oral Daily    QUEtiapine  100 mg Oral Nightly    sucralfate  1 g Oral 4x Daily    atorvastatin  20 mg Oral Daily    amitriptyline  25 mg Oral Nightly    metoclopramide  10 mg Intravenous Q6H     Continuous Infusions:    sodium chloride 50 mL/hr at 11/09/20 1719     PRN Meds: docusate sodium, sodium chloride flush, acetaminophen **OR** acetaminophen, promethazine **OR** ondansetron, bisacodyl, calcium carbonate, LORazepam    Data:     Past Medical History:   has a past medical history of Anxiety, Arthritis, Back pain, Bronchitis, Caffeine use, Carpal tunnel syndrome, Cataracts, bilateral, Constipation, Depression, Diarrhea, GERD (gastroesophageal reflux disease), H/O gastric ulcer, Hyperlipidemia, Hypertension, Mumps, MVP (mitral valve prolapse), Recurrent UTI, Sinusitis, Tracheostomy in place [facility]), Ulcerative esophagitis, and Wellness examination.     Social History:   reports that she has quit smoking. Her smoking use included cigarettes. She has a 50.00 pack-year smoking history. She has never used smokeless tobacco. She reports that she does not drink alcohol or use drugs. Family History:   Family History   Problem Relation Age of Onset   Phillip Anguiano Cancer Mother         uterine    Heart Attack Mother     Heart Attack Father     Other Maternal Grandfather         foot gangrene    Other Paternal Grandmother         bleeding ulcers    Other Paternal Grandfather         lung cancer       Vitals:  BP (!) 114/52   Pulse 72   Temp 99 °F (37.2 °C) (Oral)   Resp 18   Ht 5' 2\" (1.575 m)   Wt 183 lb (83 kg)   SpO2 97%   BMI 33.47 kg/m²   Temp (24hrs), Av.2 °F (36.8 °C), Min:96.6 °F (35.9 °C), Max:99.7 °F (37.6 °C)    No results for input(s): POCGLU in the last 72 hours. I/O (24Hr): Intake/Output Summary (Last 24 hours) at 11/10/2020 0919  Last data filed at 11/10/2020 0443  Gross per 24 hour   Intake 898 ml   Output 1700 ml   Net -802 ml       Labs:  Hematology:  Recent Labs     20  0610  11/09/20  0152 11/09/20  1020 11/10/20  0552   WBC 7.8  --   --  7.1 7.0   RBC 3.57*  --   --  3.32* 3.41*   HGB 9.7*   < > 8.2* 9.0* 9.3*   HCT 33.0*   < > 28.0* 31.4* 31.2*   MCV 92.4  --   --  94.6 91.5   MCH 27.2  --   --  27.1 27.3   MCHC 29.4  --   --  28.7 29.8   RDW 16.6*  --   --  16.3* 16.1*     --   --  203 191   MPV 10.4  --   --  11.1 10.5    < > = values in this interval not displayed.      Chemistry:  Recent Labs     20  0610 11/09/20  1020 11/10/20  0552    144 143   K 4.0 3.8 3.8    113* 110*   CO2 22 21 24   GLUCOSE 91 83 95   BUN 30* 14 8   CREATININE 0.99* 0.93* 0.81   MG 1.8 1.9 1.7   ANIONGAP 9 10 9   LABGLOM 55* 59* >60   GFRAA >60 >60 >60   CALCIUM 8.1* 8.4* 8.6   PHOS 3.6 3.2 3.1     Recent Labs     20  0610 20  1020 11/10/20  0552   LABALBU 2.7* 2.8* 2.9*     ABG:  Lab Results   Component Value Date    [facility] 7.437 2020    POCPCO2 42.3 05/30/2020    POCPO2 129.9 05/30/2020    POCHCO3 28.6 05/30/2020    NBEA NOT REPORTED 05/30/2020    PBEA 4 05/30/2020    IPY4JGV 30 05/30/2020    GEIL8FTX 99 05/30/2020    FIO2 40.0 05/30/2020     Lab Results   Component Value Date/Time    SPECIAL NOT REPORTED 07/05/2020 06:30 PM     Lab Results   Component Value Date/Time    CULTURE NOT NEED PER RN 07/05/2020 06:30 PM       Radiology:  Ct Abdomen Pelvis W Iv Contrast    Result Date: 11/7/2020  *Wall thickening involving the distal esophagus and moderate size hiatal hernia which are fluid-filled suggesting esophagitis/gastritis etiology of which may be infectious or inflammatory versus less likely neoplastic. Adjacent subcentimeter distal paraesophageal lymph node which may be reactive in nature. Recommend GI consultation and consider further assessment with endoscopy as clinically warranted. *Numerous incidental and chronic/postsurgical findings as detailed above. Xr Chest Portable    Result Date: 11/7/2020  Cardiomegaly and pulmonary vascular congestion. No focal consolidation. Follow-up PA and lateral chest radiographs may be helpful for further evaluation as warranted.        Physical Examination:        General appearance:  alert, cooperative and no distress, elderly  female sitting up in bed  Mental Status:  oriented to person, place and time and normal affect  Lungs:  clear to auscultation bilaterally, normal effort  Heart:  regular rate and rhythm, no murmur  Abdomen:  soft, nontender, nondistended, normal bowel sounds, no masses, hepatomegaly, splenomegaly  Extremities:  no edema, redness, tenderness in the calves  Skin:  no gross lesions, rashes, induration    Assessment:        Hospital Problems           Last Modified POA    * (Principal) Acute upper GI bleed 11/7/2020 Yes    Depression 11/7/2020 Yes    Anxiety 11/7/2020 Yes    Urine retention 11/7/2020 Yes    Hiatal hernia 11/8/2020 Yes    UGIB (upper gastrointestinal bleed) 11/8/2020 Yes    Acute on chronic blood loss anemia 11/7/2020 Yes    H/O gastric ulcer 11/7/2020 Yes    Hyperlipidemia 11/7/2020 Yes    Hypertension 11/7/2020 Yes    Tobacco abuse 11/7/2020 Yes    S/P Nissen fundoplication (without gastrostomy tube) procedure 11/7/2020 Yes    Esophagitis determined by endoscopy 11/8/2020 Yes                Plan:        1. Appreciate GI input  2. Protonix 40 mg twice daily x8 weeks and Carafate 1 g 4 times daily x4 weeks  3. Follow-up with GI in the outpatient setting, repeat EGD in 8 to 10 weeks  4. Follow up with general surgery to discuss antireflux surgery  5. Recent history of robotic gastric fundoplication on 1/33/7193  Patient was seen on 7/7/2020 for upper GI bleed as well  6. Continue to monitor hemoglobins - improved this am  7. Elevated troponin-likely type II mechanism  8. Anxiety-patient takes Elavil, BuSpar, Lexapro for anxiety, as needed Ativan added  9. Intractable nausea and vomiting-resolved  10. Gastroparesis-patient is on Reglan 10 mg every 6 hours at home  11.  Disposition: ok for discharge back to [facility]    33 Dania Akins DO  11/10/2020  9:19 AM

## 2020-11-10 NOTE — ANESTHESIA POSTPROCEDURE EVALUATION
Department of Anesthesiology  Postprocedure Note    Patient: Tony Parrish  MRN: [id number]  YOB: 1945  Date of evaluation: 11/10/2020  Time:  8:04 AM     Procedure Summary     Date:  11/09/20 Room / Location:  [address] [place] [address] / [facility]    Anesthesia Start:  1250 Anesthesia Stop:  4997    Procedure:  EGD DIAGNOSTIC ONLY (N/A ) Diagnosis:  (Coffee ground emesis)    Surgeon:  Navid Ignacio MD Responsible Provider:  Myke Worthy MD    Anesthesia Type:  MAC ASA Status:  4          Anesthesia Type: MAC    Alhaji Phase I:      Alhaji Phase II: Alhaji Score: 10    Last vitals: Reviewed and per EMR flowsheets.        Anesthesia Post Evaluation    Patient location during evaluation: PACU  Patient participation: complete - patient participated  Level of consciousness: awake and alert  Pain score: 2  Airway patency: patent  Nausea & Vomiting: no nausea and no vomiting  Complications: no  Cardiovascular status: hemodynamically stable  Respiratory status: acceptable  Hydration status: euvolemic

## 2020-11-10 NOTE — CARE COORDINATION
Transitional planning-patient discharged. [facility] for 2PM. Notified Miriam at [facility]. Notified son Alfredo Adler. Notified patient. Faxed AVS to [facility].

## 2020-11-10 NOTE — PLAN OF CARE
Problem: Falls - Risk of:  Goal: Will remain free from falls  Description: Will remain free from falls  11/10/2020 0724 by Aaron Morris RN  Outcome: Ongoing  11/9/2020 2356 by Mlnida Esquivel  Outcome: Ongoing  Goal: Absence of physical injury  Description: Absence of physical injury  11/10/2020 0724 by Aaron Morris RN  Outcome: Ongoing  11/9/2020 2356 by Mlnida Esquivel  Outcome: Ongoing     Problem: Pain:  Goal: Pain level will decrease  Description: Pain level will decrease  11/10/2020 0724 by Aaron Morris RN  Outcome: Ongoing  11/9/2020 2356 by Claudine Esquivel  Outcome: Ongoing  Goal: Control of acute pain  Description: Control of acute pain  11/10/2020 0724 by Aaron Morris RN  Outcome: Ongoing  11/9/2020 2356 by Claudine Esquivel  Outcome: Ongoing  Goal: Control of chronic pain  Description: Control of chronic pain  11/10/2020 0724 by Aaron Morris RN  Outcome: Ongoing  11/9/2020 2356 by Claudine Esquivel  Outcome: Ongoing     Problem: Anxiety:  Goal: Level of anxiety will decrease  Description: Level of anxiety will decrease  11/10/2020 0724 by Aaron Morris RN  Outcome: Ongoing  11/9/2020 2356 by Claudine Esquivel  Outcome: Ongoing     Problem: Nausea/Vomiting:  Goal: Absence of nausea/vomiting  Description: Absence of nausea/vomiting  11/10/2020 0724 by Aaron Morris RN  Outcome: Ongoing  11/9/2020 2356 by Claudine Esquivel  Outcome: Ongoing  Goal: Able to drink  Description: Able to drink  11/10/2020 0724 by Aaron Morris RN  Outcome: Ongoing  11/9/2020 2356 by Claudine Esquivel  Outcome: Ongoing  Goal: Able to eat  Description: Able to eat  11/10/2020 0724 by Aaron Morris RN  Outcome: Ongoing  11/9/2020 2356 by Claudine Esquivel  Outcome: Ongoing  Goal: Ability to achieve adequate nutritional intake will improve  Description: Ability to achieve adequate nutritional intake will improve  11/10/2020 0724 by Aaron Morris RN  Outcome: Ongoing  11/9/2020 2356 by Claudine Esquivel  Outcome: Ongoing Problem: Nutrition Deficit:  Goal: Ability to achieve adequate nutritional intake will improve  Description: Ability to achieve adequate nutritional intake will improve  11/10/2020 0724 by uJanjose Julio RN  Outcome: Ongoing  11/9/2020 2368 by Ramonita Brandon  Outcome: Ongoing

## 2020-11-10 NOTE — DISCHARGE INSTR - COC
Continuity of Care Form    Patient Name: Camilla Lopez   :  1945  MRN:  [id number]    Admit date:  2020  Discharge date:  11-    Code Status Order: Full Code   Advance Directives:      Admitting Physician:  Nila Penaloza DO  PCP: Nicole Frias MD    Discharging Nurse:  [facility] Unit/Room#:   Discharging Unit Phone Number: [phone]    Emergency Contact:   Extended Emergency Contact Information  Primary Emergency Contact: Hamilton Ramirez  Home Phone: [phone]  Relation: Child    Past Surgical History:  Past Surgical History:   Procedure Laterality Date    APPENDECTOMY      CARPAL TUNNEL RELEASE      CHOLECYSTECTOMY, LAPAROSCOPIC  2020     LAPAROSCOPIC ROBOTIC CHOLECYSTECTOMY, PYLOROPLASTY     CHOLECYSTECTOMY, LAPAROSCOPIC N/A 2020    XI LAPAROSCOPIC ROBOTIC CHOLECYSTECTOMY, PYLOROPLASTY performed by Sanjana Norton MD at [address]      EGD  3/17/2020         ERCP  2020    with stent insertion, balloon dilation, sphinctereotomy    ERCP  2020    ** CASE IN OR WITY GI STAFF** ERCP STENT INSERTION performed by Cielo Sprague MD at [facility]    ERCP  2020    ** CASE IN OR WITH GI STAFF**ERCP SPHINCTER/PAPILLOTOMY performed by Cielo Sprague MD at [facility]    ERCP  2020    ** CASE IN OR WITH GI STAFF**ERCP DILATION BALLOON performed by Cielo Sprague MD at [address]    GASTRIC FUNDOPLICATION N/A     XI LAPAROSCOPIC ROBOTIC HIATAL HERNIA REPAIR, CHERYL FUNDOPLICATION performed by Sanjana Norton MD at [facility] N/A 3/27/2020    EGD PEG TUBE PLACEMENT performed by Sanjana Norton MD at [facility]. CATH POWER PICC TRIPLE  3/4/2020         HYSTERECTOMY      Abdominal    JOINT REPLACEMENT Right     right hip    OVARY REMOVAL      RHINOPLASTY      TONSILLECTOMY      TOTAL HIP ARTHROPLASTY      TOTAL NEPHRECTOMY      atrophic from infections, age 13   Yvon Loser TRACHEOSTOMY N/A 3/27/2020    **ADD ON **WANTS 10:00AM **TRACHEOTOMY performed by Jenni Osborne MD at [address] 4/2/2020    TRACHEOTOMY EXCHANGE performed by Jenni Osborne MD at [facility] 3/17/2020    BEDSIDE EGD ESOPHAGOGASTRODUODENOSCOPY (ICU) performed by Jenni Osborne MD at [address] [place] [address] N/A 5/18/2020    EGD BIOPSY performed by Leoncio Hicks MD at [address] [place] [address]  5/18/2020    EGD DILATION BALLOON performed by Leoncio Hicks MD at [address] [place] [address] N/A 7/6/2020    ** CASE IN O.R. WITH GI STAFF** EGD ESOPHAGOGASTRODUODENOSCOPY performed by Lloyd Conklin MD at [address] [place] [address] N/A 11/9/2020    EGD DIAGNOSTIC ONLY performed by Louise Howard MD at [facility]       Immunization History: There is no immunization history on file for this patient.     Active Problems:  Patient Active Problem List   Diagnosis Code    Frequency of urination R35.0    Back pain M54.9    GERD (gastroesophageal reflux disease) K21.9    MVP (mitral valve prolapse) I34.1    Depression F32.9    Anxiety F41.9    Urine retention R33.9    Dysphagia R13.10    Hiatal hernia K44.9    History of repair of hiatal hernia Z98.890, Z87.19    Centrilobular emphysema ([address] J43.2    Esophageal dilatation K22.8    UGIB (upper gastrointestinal bleed) K92.2    Acute on chronic blood loss anemia D62    Shock ([address] R57.9    Fever R50.9    Critical illness polyneuropathy (HCC) G62.81    Gastric outlet obstruction K31.1    Tracheostomy in place ([address] Z93.0    H/O gastric ulcer Z87.19    Hyperlipidemia E78.5    Hypertension I10    Tobacco abuse Z72.0    Chronic respiratory failure with hypoxia ([facility]) J96.11    Status post insertion of percutaneous endoscopic gastrostomy (PEG) tube ([facility]) Z93.1    S/P Nissen fundoplication (without gastrostomy tube) procedure Z98.890    Acute upper GI bleed K92.2    Acute blood loss anemia D62    Phlebitis after infusion T80. 1XXA, I80.9    Esophagitis determined by endoscopy K20.90       Isolation/Infection:   Isolation            No Isolation          Patient Infection Status       Infection Onset Added Last Indicated Last Indicated By Review Planned Expiration Resolved Resolved By    None active    Resolved    COVID-19 Rule Out 07/05/20 07/05/20 07/05/20 COVID-19 (Ordered)   07/05/20 Rule-Out Test Resulted    C-diff Rule Out 05/27/20 05/27/20 05/27/20 C DIFF TOXIN/ANTIGEN (Ordered)   05/29/20 Erich Cortes RN    COVID-19 Rule Out 05/20/20 05/20/20 05/20/20 COVID-19 (Ordered)   05/21/20 Rule-Out Test Resulted    COVID-19 Rule Out 05/14/20 05/14/20 05/14/20 COVID-19 (Ordered)   05/14/20 Rule-Out Test Resulted            Nurse Assessment:  Last Vital Signs: BP (!) 114/52   Pulse 72   Temp 99 °F (37.2 °C) (Oral)   Resp 18   Ht 5' 2\" (1.575 m)   Wt 183 lb (83 kg)   SpO2 97%   BMI 33.47 kg/m²     Last documented pain score (0-10 scale): Pain Level: 0  Last Weight:   Wt Readings from Last 1 Encounters:   11/10/20 183 lb (83 kg)     Mental Status:  oriented    IV Access:  - None    Nursing Mobility/ADLs:  Walking   Assisted  Transfer  Assisted  Bathing  Assisted  Dressing  Assisted  Toileting  Assisted  Feeding  Independent  Med Admin  Assisted  Med Delivery   whole    Wound Care Documentation and Therapy:  Wound 04/01/20 Neck Mid;Distal non-healing area of tracheostomy incision (Active)   Number of days: 222       Wound 07/06/20 Coccyx Stage 1 (Active)   Number of days: 126        Elimination:  Continence:   · Bowel: No  · Bladder: No  Urinary Catheter: None   Colostomy/Ileostomy/Ileal Conduit: PEG (not using)       Date of Last BM: 11/09/2020    Intake/Output Summary (Last 24 hours) at 11/10/2020 0837  Last data filed at 11/10/2020 0443  Gross per 24 hour   Intake 898 ml   Output 1700 ml   Net -802 ml     I/O last 3 completed shifts: In: 898 [I.V.:898]  Out: 4000 [Urine:4000]    Safety Concerns: At risk for falls    Impairments/Disabilities:      Vision    Nutrition Therapy:  Current Nutrition Therapy:   - Oral Diet:  General    Routes of Feeding: Oral  Liquids: No Restrictions  Daily Fluid Restriction: no  Last Modified Barium Swallow with Video (Video Swallowing Test): not done    Treatments at the Time of Hospital Discharge:   Respiratory Treatments: Inhaler  Oxygen Therapy:  is not on home oxygen therapy. Ventilator:    - No ventilator support    Rehab Therapies: Physical Therapy, occupational therapy  Weight Bearing Status/Restrictions: No weight bearing restirctions  Other Medical Equipment (for information only, NOT a DME order):  wheelchair and bath bench  Other Treatments: Restorative therapy at [facility]    Patient's personal belongings (please select all that are sent with patient):  Jamilah Contreras RN SIGNATURE:  Electronically signed by Santi Mccartney RN on 11/10/20 at 8:44 AM EST    CASE MANAGEMENT/SOCIAL WORK SECTION    Inpatient Status Date: ***    Readmission Risk Assessment Score:  Readmission Risk              Risk of Unplanned Readmission:        25           Discharging to Facility/ Agency   · Name: [facility]  · Address:  · Phone:  · Fax:    Dialysis Facility (if applicable)   · Name:  · Address:  · Dialysis Schedule:  · Phone:  · Fax:    / signature: Electronically signed by Rudi Mclean RN on 11/10/20 at 9:42 AM EST    PHYSICIAN SECTION    Prognosis: Fair    Condition at Discharge: Stable    Rehab Potential (if transferring to Rehab): Fair    Recommended Labs or Other Treatments After Discharge: Repeat CBC and BMP in 1 week.     Physician Certification: I certify the above information and transfer of [facility]  is necessary for the continuing treatment of the diagnosis listed and that she requires [place] for greater 30 days.      Update Admission H&P: No change in H&P    PHYSICIAN SIGNATURE:  Electronically signed by Kiran Sharma DO on 11/10/20 at 9:37 AM EST

## 2020-11-10 NOTE — PLAN OF CARE
Problem: Falls - Risk of:  Goal: Will remain free from falls  Description: Will remain free from falls  Outcome: Ongoing  Goal: Absence of physical injury  Description: Absence of physical injury  Outcome: Ongoing     Problem: Pain:  Goal: Pain level will decrease  Description: Pain level will decrease  Outcome: Ongoing  Goal: Control of acute pain  Description: Control of acute pain  Outcome: Ongoing  Goal: Control of chronic pain  Description: Control of chronic pain  Outcome: Ongoing     Problem: Anxiety:  Goal: Level of anxiety will decrease  Description: Level of anxiety will decrease  Outcome: Ongoing     Problem: Nausea/Vomiting:  Goal: Absence of nausea/vomiting  Description: Absence of nausea/vomiting  Outcome: Ongoing  Goal: Able to drink  Description: Able to drink  Outcome: Ongoing  Goal: Able to eat  Description: Able to eat  Outcome: Ongoing  Goal: Ability to achieve adequate nutritional intake will improve  Description: Ability to achieve adequate nutritional intake will improve  Outcome: Ongoing     Problem: Nutrition Deficit:  Goal: Ability to achieve adequate nutritional intake will improve  Description: Ability to achieve adequate nutritional intake will improve  Outcome: Ongoing

## 2020-11-10 NOTE — DISCHARGE SUMMARY
4-5 times on the day of admission. Patient has had a history of multiple GI problems including prior GI bleeds, severe GERD status post robotic fundoplication in February 2020. During that admission, patient had suffered from hypoxic respiratory failure requiring mechanical ventilation, tracheostomy placement, and PEG placement. She had another GI bleed in July 2020. Currently, she is without trach or PEG. She is residing in an [facility] and is working on regaining her strength. She did not require blood transfusion during this admission. She underwent EGD with our GI service. After EGD, patient was tolerating diet and was appropriate for discharge home on previous medications. She will follow-up with GI in the outpatient setting in 2 weeks. She will also need to see a general surgeon for possibly repeating a antireflux surgery. Patient understands and agrees to the above treatment plan set forth. Significant therapeutic interventions:     EGD: 11/09/2020    Findings:   · Tortuous esophagus. · LA grade D erosive esophagitis extending from 27 to 32 cm. · Irregular Z-line was noted at 32 cm. · Patulous esophageal gastric junction. Bile was seen refluxing up into the distal esophagus. · Large hiatal hernia extending from 32 to 37 cm. No Kirill erosions. · The retroflexed view of the fundus and cardia showed no evidence of prior mentioned fundoplication. · Moderate amount of bile in the entire stomach. This was cleared using scope suctioning. · Congested mucosa in the entire stomach. · The pylorus was widely patent. The mucosa at the pylorus was characterized by erythema, granularity and congestion. · The entire examined duodenum up to the third portion appeared normal.     Recommendations  1. Start Carafate 1 g 3 times daily for 4 weeks. 2. Continue pantoprazole 40 mg twice daily for 8 weeks. 3. Follow an antireflux lifestyle.   4. We will need a repeat EGD in 8 to 10 weeks to check for healing of esophagitis. 5. Follow-up with general surgery to discuss antireflux surgery. 6. Start soft diet and advance as tolerated. 7. If patient is tolerating diet she can be discharged home and follow-up in GI clinic in 2 weeks. Significant Diagnostic Studies:   Labs / Micro:  CBC:   Lab Results   Component Value Date    WBC 7.0 11/10/2020    RBC 3.41 11/10/2020    HGB 9.3 11/10/2020    HCT 31.2 11/10/2020    MCV 91.5 11/10/2020    MCH 27.3 11/10/2020    MCHC 29.8 11/10/2020    RDW 16.1 11/10/2020     11/10/2020     CMP:    Lab Results   Component Value Date    GLUCOSE 95 11/10/2020     11/10/2020    K 3.8 11/10/2020     11/10/2020    CO2 24 11/10/2020    BUN 8 11/10/2020    CREATININE 0.81 11/10/2020    ANIONGAP 9 11/10/2020    ALKPHOS 123 11/07/2020    ALT 17 11/07/2020    AST 14 11/07/2020    BILITOT 0.19 11/07/2020    LABALBU 2.9 11/10/2020    ALBUMIN 1.0 11/07/2020    LABGLOM >60 11/10/2020    GFRAA >60 11/10/2020    GFR      11/10/2020    GFR NOT REPORTED 11/10/2020    PROT 6.7 11/07/2020    CALCIUM 8.6 11/10/2020       Radiology:  Ct Abdomen Pelvis W Iv Contrast    Result Date: 11/7/2020  *Wall thickening involving the distal esophagus and moderate size hiatal hernia which are fluid-filled suggesting esophagitis/gastritis etiology of which may be infectious or inflammatory versus less likely neoplastic. Adjacent subcentimeter distal paraesophageal lymph node which may be reactive in nature. Recommend GI consultation and consider further assessment with endoscopy as clinically warranted. *Numerous incidental and chronic/postsurgical findings as detailed above. Xr Chest Portable    Result Date: 11/7/2020  Cardiomegaly and pulmonary vascular congestion. No focal consolidation. Follow-up PA and lateral chest radiographs may be helpful for further evaluation as warranted.        Consultations:    Consults:     Final Specialist Recommendations/Findings:   IP CONSULT TO GI  IP CONSULT TO GI The patient was seen and examined on day of discharge and this discharge summary is in conjunction with any daily progress note from day of discharge. Discharge plan:     Disposition: Skilled nursing facility    Physician Follow Up:     Aileen Guzman MD  Chris Tyler, [address]  [address] [address]  [phone]    Schedule an appointment as soon as possible for a visit  Please call to make a follow up appt     MD Kwabena Choum [address] [place] [address] [address] [facility]  [phone]    Schedule an appointment as soon as possible for a visit in 2 weeks  To discuss options for repeat reflux surgery    Aria Freitas MD  [address] [facility] [address] [phone]    Schedule an appointment as soon as possible for a visit in 1 week  For follow up as needed. Requiring Further Evaluation/Follow Up POST HOSPITALIZATION/Incidental Findings: Follow-up with GI and general surgery as noted above    Diet: cardiac diet, do not eat after 8 PM.  Do not eat spicy foods, acidic foods, or chocolate before bed. Limit drinking fluids that are high in acidity. Activity: As tolerated with assistance    Instructions to Patient: As above    Discharge Medications:      Medication List      START taking these medications    benzocaine 10 % mucosal gel  Commonly known as:  ORAJEL  Take by mouth as needed. CONTINUE taking these medications    Acetaminophen 500 MG Caps     amitriptyline 25 MG tablet  Commonly known as:  ELAVIL  Take 1 tablet by mouth nightly     busPIRone 10 MG tablet  Commonly known as:  BUSPAR  Take 2 tablets by mouth 3 times daily     calcium carbonate 500 MG chewable tablet  Commonly known as:  TUMS     clonazePAM 0.5 MG tablet  Commonly known as:  KLONOPIN  Take 0.5 tablets by mouth 2 times daily for 60 days.  Taking 1/2 tablet BID     docusate 50 MG/5ML liquid  Commonly known as:  COLACE  10 mLs by Per G Tube route 2 times daily     escitalopram 20 MG tablet  Commonly known as:  LEXAPRO  Take 1 tablet by mouth daily     ferrous sulfate 325 (65 Fe) MG tablet  Commonly known as:  IRON 325  Take 1 tablet by mouth 2 times daily     furosemide 20 MG tablet  Commonly known as:  Lasix  Take 1 tablet by mouth daily     gabapentin 300 MG capsule  Commonly known as:  NEURONTIN     metoprolol succinate 25 MG extended release tablet  Commonly known as:  TOPROL XL  Take 1 tablet by mouth daily     midodrine 5 MG tablet  Commonly known as:  PROAMATINE  Take 1 tablet by mouth 3 times daily as needed (if SBP < 100)     Oyster Shell 500 MG Tabs     pantoprazole 40 MG tablet  Commonly known as:  PROTONIX  Take 1 tablet by mouth 2 times daily     QUEtiapine 100 MG tablet  Commonly known as:  SEROQUEL  Take 1 tablet by mouth nightly     RA Vitamin D-3 50 MCG (2000 UT) Caps  Generic drug:  Cholecalciferol     simvastatin 40 MG tablet  Commonly known as:  ZOCOR     sucralfate 1 GM tablet  Commonly known as:  CARAFATE  Take 1 tablet by mouth 4 times daily     traZODone 100 MG tablet  Commonly known as:  DESYREL  Take 1 tablet by mouth nightly        STOP taking these medications    metoclopramide 5 MG/ML injection  Commonly known as:  REGLAN           Where to Get Your Medications      Information about where to get these medications is not yet available    Ask your nurse or doctor about these medications  · benzocaine 10 % mucosal gel         No discharge procedures on file. Time Spent on discharge is  43 mins in patient examination, evaluation, counseling as well as medication reconciliation, prescriptions for required medications, discharge plan and follow up. Electronically signed by   Haylie Nieves DO  11/10/2020  9:38 AM      Thank you Dr. Oneida Moran MD for the opportunity to be involved in this patient's care.

## 2020-11-10 NOTE — PLAN OF CARE
Problem: Falls - Risk of:  Goal: Will remain free from falls  Description: Will remain free from falls  11/10/2020 1327 by Lucy Vazquez RN  Outcome: Completed  11/10/2020 0724 by Lucy Vazquez RN  Outcome: Ongoing  11/9/2020 2356 by Gaby Vega  Outcome: Ongoing  Goal: Absence of physical injury  Description: Absence of physical injury  11/10/2020 1327 by Lucy Vazquez RN  Outcome: Completed  11/10/2020 0724 by Lucy Vazquez RN  Outcome: Ongoing  11/9/2020 2356 by Gaby Vega  Outcome: Ongoing     Problem: Pain:  Goal: Pain level will decrease  Description: Pain level will decrease  11/10/2020 1327 by Lcuy Vazquez RN  Outcome: Completed  11/10/2020 0724 by Lucy Vazquez RN  Outcome: Ongoing  11/9/2020 2356 by Gaby Vega  Outcome: Ongoing  Goal: Control of acute pain  Description: Control of acute pain  11/10/2020 1327 by Lucy Vazquez RN  Outcome: Completed  11/10/2020 0724 by Lucy Vazquez RN  Outcome: Ongoing  11/9/2020 2356 by Gaby Vega  Outcome: Ongoing  Goal: Control of chronic pain  Description: Control of chronic pain  11/10/2020 1327 by Lucy Vazquez RN  Outcome: Completed  11/10/2020 0724 by Lucy Vazquez RN  Outcome: Ongoing  11/9/2020 2356 by Gaby Vega  Outcome: Ongoing     Problem: Anxiety:  Goal: Level of anxiety will decrease  Description: Level of anxiety will decrease  11/10/2020 1327 by Lucy Vazquez RN  Outcome: Completed  11/10/2020 0724 by Lucy Vazquez RN  Outcome: Ongoing  11/9/2020 2356 by Gaby Vega  Outcome: Ongoing     Problem: Nausea/Vomiting:  Goal: Absence of nausea/vomiting  Description: Absence of nausea/vomiting  11/10/2020 1327 by Lucy Vazquez RN  Outcome: Completed  11/10/2020 0724 by Lucy Vazquez RN  Outcome: Ongoing  11/9/2020 2356 by Gaby Vega  Outcome: Ongoing  Goal: Able to drink  Description: Able to drink  11/10/2020 1327 by Lucy Vazquez RN  Outcome: Completed  11/10/2020 0724 by Lucy Vazquez RN  Outcome:

## 2020-11-12 NOTE — TELEPHONE ENCOUNTER
Dr Ramos Davies wants to see patient then EGD. Writer called patient to schedule visit. She states she is at [facility] [phone] and would like us to call them to set up appt. Writer called and spoke with Leda Fgiueroa who states her nurse is on break. She will have her call us back.     Patient can be put in for VV today or 11/30

## 2020-11-12 NOTE — TELEPHONE ENCOUNTER
Received a call back from [place] at [facility] and scheduled VV for 11/30/20 3:45 pm.    Nurse [phone]  They do not know who it will be on this day

## 2020-11-30 ENCOUNTER — VIRTUAL VISIT (OUTPATIENT)
Dept: GASTROENTEROLOGY | Age: 75
End: 2020-11-30
Payer: MEDICARE

## 2020-11-30 PROCEDURE — 3017F COLORECTAL CA SCREEN DOC REV: CPT | Performed by: INTERNAL MEDICINE

## 2020-11-30 PROCEDURE — G8427 DOCREV CUR MEDS BY ELIG CLIN: HCPCS | Performed by: INTERNAL MEDICINE

## 2020-11-30 PROCEDURE — G8400 PT W/DXA NO RESULTS DOC: HCPCS | Performed by: INTERNAL MEDICINE

## 2020-11-30 PROCEDURE — 4040F PNEUMOC VAC/ADMIN/RCVD: CPT | Performed by: INTERNAL MEDICINE

## 2020-11-30 PROCEDURE — 1111F DSCHRG MED/CURRENT MED MERGE: CPT | Performed by: INTERNAL MEDICINE

## 2020-11-30 PROCEDURE — 1123F ACP DISCUSS/DSCN MKR DOCD: CPT | Performed by: INTERNAL MEDICINE

## 2020-11-30 PROCEDURE — 99214 OFFICE O/P EST MOD 30 MIN: CPT | Performed by: INTERNAL MEDICINE

## 2020-11-30 PROCEDURE — 1090F PRES/ABSN URINE INCON ASSESS: CPT | Performed by: INTERNAL MEDICINE

## 2020-11-30 RX ORDER — METOCLOPRAMIDE 5 MG/1
5 TABLET ORAL 4 TIMES DAILY
Status: ON HOLD | COMMUNITY
End: 2020-12-16 | Stop reason: HOSPADM

## 2020-11-30 ASSESSMENT — ENCOUNTER SYMPTOMS
RECTAL PAIN: 0
WHEEZING: 0
TROUBLE SWALLOWING: 1
COUGH: 0
VOMITING: 0
DIARRHEA: 0
BLOOD IN STOOL: 0
NAUSEA: 0
ABDOMINAL DISTENTION: 0
ANAL BLEEDING: 0
CONSTIPATION: 0
ABDOMINAL PAIN: 0
CHOKING: 0

## 2020-11-30 NOTE — PROGRESS NOTES
Chris Akins is a 76 y.o. female evaluated via telephone on 11/30/2020. Consent:  She and/or health care decision maker is aware that that she may receive a bill for this telephone service, depending on her insurance coverage, and has provided verbal consent to proceed: Yes      GI  FOLLOW UP    INTERVAL HISTORY:   No referring provider defined for this encounter. Chief Complaint   Patient presents with    Follow-up     Patient is f/u on EGD and needs repeat. HISTORY OF PRESENT ILLNESS: Mel Davis is a 76 y.o. female , referred for evaluation of*post operative outlet obstruction, elevated liver enzymes, large hiatal hernia status post robotic repair with fundoplication, PEG and trach. When she was in the hospital here  Work-up for the elevated liver enzymes showed that the patient had cholelithiasis without cholecystitis, there was dilatation of the CBD, other liver disease markers were negative, EGD was done showed severe esophagitis, severe gastritis, pyloric stenosis which was dilated, MRI was done showed CBD obstruction for which an ERCP was done, was difficult to read please refer to the ERCP result. According to her she did have surgery at for the gallbladder but I am not sure of that part  Patient is poor historian  Looks like this patient patient presented again to the hospital with GI bleed, visiting gastroenterologist in [facility]. [facility] did another scope on her she did have severe esophagitis the same way no other abnormality.   Operative Note        Patient: Chris Akins  YOB: 1945  MRN: [id number]     Date of Procedure: 11/9/2020     Pre-Op Diagnosis: Coffee ground emesis     Post-Op Diagnosis: Same  Severe esophagitis  Patulous EGJ  Bile reflux gastropathy       Procedure(s):  EGD DIAGNOSTIC ONLY     Surgeon(s):  Armond Reddy MD     Assistant:   First Assistant: Victoriano Read RN     Anesthesia: Monitor Anesthesia Care     Estimated Blood Loss (mL): None     Complications: None     Specimens:   * No specimens in log *     Implants:  * No implants in log *      Drains:        Gastrostomy/Enterostomy/Jejunostomy Percutaneous Endoscopic Gastrostomy (PEG) LLQ 1 20 fr (Active)       Urethral Catheter (Active)   Catheter Indications Acute urinary retention/obstruction 11/09/20 0800   Site Assessment No urethral drainage 11/09/20 0800   Urine Color Yellow 11/09/20 0800   Urine Appearance Sediment 11/08/20 2001   Output (mL) 2300 mL 11/09/20 0800         Findings:   · Tortuous esophagus. · LA grade D erosive esophagitis extending from 27 to 32 cm. · Irregular Z-line was noted at 32 cm. · Patulous esophageal gastric junction. Bile was seen refluxing up into the distal esophagus. · Large hiatal hernia extending from 32 to 37 cm. No Kirill erosions. · The retroflexed view of the fundus and cardia showed no evidence of prior mentioned fundoplication. · Moderate amount of bile in the entire stomach. This was cleared using scope suctioning. · Congested mucosa in the entire stomach. · The pylorus was widely patent. The mucosa at the pylorus was characterized by erythema, granularity and congestion. · The entire examined duodenum up to the third portion appeared normal.     Recommendations  1. Start Carafate 1 g 3 times daily for 4 weeks. 2. Continue pantoprazole 40 mg twice daily for 8 weeks. 3. Follow an antireflux lifestyle. 4. We will need a repeat EGD in 8 to 10 weeks to check for healing of esophagitis. 5. Follow-up with general surgery to discuss antireflux surgery. 6. Start soft diet and advance as tolerated. 7. If patient is tolerating diet she can be discharged home and follow-up in GI clinic in 2 weeks. 8. No further recommendations from GI standpoint.   GI will sign off.      Detailed Description of Procedure:   Informed consent was obtained from the patient after explanation of the procedure including indications, description of the procedure, benefits and possible risks and complications of the procedure, and alternatives. Questions were answered. The patient's history was reviewed and a directed physical examination was performed prior to the procedure.     Patient was monitored throughout the procedure with pulse oximetry and periodic assessment of vital signs. Patient was sedated as noted above. With the patient in the left lateral decubitus position, the Olympus videoendoscope was placed in the patient's mouth and under direct visualization passed into the esophagus. Visualization of the esophagus, stomach, and duodenum was performed during both introduction and withdrawal of the endoscope and retroflexed view of the proximal stomach was obtained. The scope was passed to the 3rd portion of the duodenum.      The patient tolerated the procedure well and was taken to the recovery area in good condition. The patient  was taken to the recovery area in good condition.     Electronically signed by Laina Ng MD on 11/9/2020 at 1:01 PM        Past Medical,Family, and Social History reviewed and does contribute to the patient presentingcondition. Patient's PMH/PSH,SH,PSYCH Hx, MEDs, ALLERGIES, and ROS were all reviewed and updated in the appropriate sections.     PAST MEDICAL HISTORY:  Past Medical History:   Diagnosis Date    Anxiety     Arthritis     Back pain     Bronchitis     Caffeine use     8 coffee / day    Carpal tunnel syndrome     Cataracts, bilateral     Constipation     Depression     Diarrhea     GERD (gastroesophageal reflux disease)     H/O gastric ulcer     Hyperlipidemia     Hypertension     Mumps     MVP (mitral valve prolapse)     Dr. Eliu Roberts in May 2019    Recurrent UTI     Dr. Reba Douglas    Sinusitis     Tracheostomy in place [facility])     Ulcerative esophagitis     Wellness examination     Dr. Azeb Mcgrath seen in Jan 2020       Past Surgical History:   Procedure Laterality Date    APPENDECTOMY      EQASYY CBPXNZ RELEASE      CHOLECYSTECTOMY, LAPAROSCOPIC  05/22/2020     LAPAROSCOPIC ROBOTIC CHOLECYSTECTOMY, PYLOROPLASTY     CHOLECYSTECTOMY, LAPAROSCOPIC N/A 5/22/2020    XI LAPAROSCOPIC ROBOTIC CHOLECYSTECTOMY, PYLOROPLASTY performed by Kellie Polo MD at [facility] EGD  3/17/2020         ERCP  05/21/2020    with stent insertion, balloon dilation, sphinctereotomy    ERCP  5/21/2020    ** CASE IN OR WITY GI STAFF** ERCP STENT INSERTION performed by Vinay Madison MD at [facility]    ERCP  5/21/2020    ** CASE IN OR WITH GI STAFF**ERCP SPHINCTER/PAPILLOTOMY performed by Vinay Madison MD at [facility]    ERCP  5/21/2020    ** CASE IN OR WITH GI STAFF**ERCP DILATION BALLOON performed by Vinay Madison MD at [address]    GASTRIC FUNDOPLICATION N/A 7/65/3893    XI LAPAROSCOPIC ROBOTIC HIATAL HERNIA REPAIR, CHERYL FUNDOPLICATION performed by Kellie Polo MD at [facility] N/A 3/27/2020    EGD PEG TUBE PLACEMENT performed by Kellie Polo MD at [address] [facility] CATH POWER PICC TRIPLE  3/4/2020         HYSTERECTOMY      Abdominal    JOINT REPLACEMENT Right     right hip    OVARY REMOVAL      RHINOPLASTY      TONSILLECTOMY      TOTAL HIP ARTHROPLASTY      TOTAL NEPHRECTOMY      atrophic from infections, age 13   Buddy Self TRACHEOSTOMY N/A 3/27/2020    **ADD ON **WANTS 10:00AM **TRACHEOTOMY performed by Kellie Polo MD at [address] [facility] N/A 4/2/2020    TRACHEOTOMY EXCHANGE performed by Kellie Polo MD at [address] [facility] 3/17/2020    BEDSIDE EGD ESOPHAGOGASTRODUODENOSCOPY (ICU) performed by Kellie Polo MD at [address] [place] [address] 5/18/2020    EGD BIOPSY performed by Vinay Madison MD at [address] [place] [address]  5/18/2020    EGD DILATION BALLOON performed by [facility] MD Corrina at [address] [facility] ENDOSCOPY N/A 7/6/2020    ** CASE IN O.R. WITH GI STAFF** EGD ESOPHAGOGASTRODUODENOSCOPY performed by Indigo Ward MD at [address] [facility] N/A 11/9/2020    EGD DIAGNOSTIC ONLY [facility] at [address]:    Current Outpatient Medications:     metoclopramide (REGLAN) 5 MG tablet, Take 5 mg by mouth 4 times daily, Disp: , Rfl:     pantoprazole (PROTONIX) 40 MG tablet, Take 1 tablet by mouth 2 times daily, Disp: , Rfl:     ferrous sulfate (IRON 325) 325 (65 Fe) MG tablet, Take 1 tablet by mouth 2 times daily, Disp: , Rfl:     amitriptyline (ELAVIL) 25 MG tablet, Take 1 tablet by mouth nightly, Disp: , Rfl:     busPIRone (BUSPAR) 10 MG tablet, Take 2 tablets by mouth 3 times daily, Disp: , Rfl:     escitalopram (LEXAPRO) 20 MG tablet, Take 1 tablet by mouth daily, Disp: , Rfl:     metoprolol succinate (TOPROL XL) 25 MG extended release tablet, Take 1 tablet by mouth daily, Disp: 30 tablet, Rfl: 3    QUEtiapine (SEROQUEL) 100 MG tablet, Take 1 tablet by mouth nightly, Disp: 60 tablet, Rfl: 3    furosemide (LASIX) 20 MG tablet, Take 1 tablet by mouth daily, Disp: 60 tablet, Rfl: 3    docusate (COLACE) 50 MG/5ML liquid, 10 mLs by Per G Tube route 2 times daily, Disp: 100 mL, Rfl: 2    sucralfate (CARAFATE) 1 GM tablet, Take 1 tablet by mouth 4 times daily, Disp: 120 tablet, Rfl: 3    traZODone (DESYREL) 100 MG tablet, Take 1 tablet by mouth nightly, Disp: 30 tablet, Rfl: 0    RA VITAMIN D-3 50 MCG (2000 UT) CAPS, take 1 capsule by mouth once daily, Disp: , Rfl:     Oyster Shell 500 MG TABS, Take 500 mg by mouth 2 times daily , Disp: , Rfl:     Acetaminophen 500 MG CAPS, Take 1 tablet by mouth as needed for Pain (follow OTC instructions), Disp: , Rfl:     calcium carbonate (TUMS) 500 MG chewable tablet, Take 1 tablet by mouth 3 times daily as needed for Heartburn, Disp: , Rfl:     gabapentin (NEURONTIN) 300 MG capsule, Take 300 mg by mouth 3 times daily. , Disp: , Rfl:     simvastatin (ZOCOR) 40 MG tablet, Take 40 mg by mouth nightly., Disp: , Rfl:     benzocaine (ORAJEL) 10 % mucosal gel, Take by mouth as needed. (Patient not taking: Reported on 11/30/2020), Disp: 1 Tube, Rfl: 0    clonazePAM (KLONOPIN) 0.5 MG tablet, Take 0.5 tablets by mouth 2 times daily for 60 days. Taking 1/2 tablet BID, Disp: 5 tablet, Rfl: 0    midodrine (PROAMATINE) 5 MG tablet, Take 1 tablet by mouth 3 times daily as needed (if SBP < 100) (Patient not taking: Reported on 11/30/2020), Disp: , Rfl:     ALLERGIES:   Allergies   Allergen Reactions    Prednisone Anxiety    Compazine [Prochlorperazine Maleate]     Penicillin V Potassium     Penicillins Other (See Comments)     shock       FAMILY HISTORY:       Problem Relation Age of Onset    Cancer Mother         uterine    Heart Attack Mother     Heart Attack Father     Other Maternal Grandfather         foot gangrene    Other Paternal Grandmother         bleeding ulcers    Other Paternal Grandfather         lung cancer         SOCIAL HISTORY:   Social History     Socioeconomic History    Marital status:       Spouse name: Not on file    Number of children: 2    Years of education: Not on file    Highest education level: Not on file   Occupational History    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   Social Needs    Financial resource strain: Not on file    Food insecurity     Worry: Not on file     Inability: Not on file   [employer] needs     Medical: Not on file     Non-medical: Not on file   Tobacco Use    Smoking status: Former Smoker     Packs/day: 1.00     Years: 50.00     Pack years: 50.00     Types: Cigarettes    Smokeless tobacco: Never Used    Tobacco comment: quit 1 year ago    Substance and Sexual Activity    Alcohol use: No    Drug use: No    Sexual activity: Never   Lifestyle    Physical activity     Days per week: Not on file     Minutes per session: Not on file    Stress: Not on file   Relationships    Social connections     Talks on phone: Not on file     Gets together: Not on file     Attends Gnosticism service: Not on file     Active member of club or organization: Not on file     Attends meetings of clubs or organizations: Not on file     Relationship status: Not on file    Intimate partner violence     Fear of current or ex partner: Not on file     Emotionally abused: Not on file     Physically abused: Not on file     Forced sexual activity: Not on file   Other Topics Concern    Not on file   Social History Narrative    Not on file       REVIEW OF SYSTEMS: A 12-point review of systemswas obtained and pertinent positives and negatives were enumerated above in the history of present illness. All other reviewed systems / symptoms were negative. Review of Systems   Constitutional: Negative for appetite change, fatigue and unexpected weight change. HENT: Positive for trouble swallowing. Respiratory: Negative for cough, choking and wheezing. Cardiovascular: Negative for chest pain, palpitations and leg swelling. Gastrointestinal: Negative for abdominal distention, abdominal pain (indigestion), anal bleeding, blood in stool, constipation, diarrhea, nausea, rectal pain and vomiting. Genitourinary: Negative for difficulty urinating. Allergic/Immunologic: Negative for environmental allergies and food allergies. Neurological: Negative for dizziness, weakness, light-headedness, numbness and headaches. Hematological: Bruises/bleeds easily. Psychiatric/Behavioral: Negative for sleep disturbance. The patient is not nervous/anxious.             LABORATORY DATA: Reviewed  Lab Results   Component Value Date    WBC 7.0 11/10/2020    HGB 9.3 (L) 11/10/2020    HCT 31.2 (L) 11/10/2020    MCV 91.5 11/10/2020     11/10/2020     11/10/2020    K 3.8 11/10/2020     (H) 11/10/2020    CO2 24 11/10/2020    BUN 8 11/10/2020    CREATININE 0.81 11/10/2020    LABALBU 2.9 (L) 11/10/2020    BILITOT 0.19 (L) 11/07/2020    ALKPHOS 123 (H) 11/07/2020    AST 14 11/07/2020    ALT 17 11/07/2020    INR 1.0 11/07/2020         Lab Results   Component Value Date    RBC 3.41 (L) 11/10/2020    HGB 9.3 (L) 11/10/2020    MCV 91.5 11/10/2020    MCH 27.3 11/10/2020    MCHC 29.8 11/10/2020    RDW 16.1 (H) 11/10/2020    MPV 10.5 11/10/2020    BASOPCT 1 11/07/2020    LYMPHSABS 1.48 11/07/2020    MONOSABS 0.66 11/07/2020    NEUTROABS 10.23 (H) 11/07/2020    EOSABS 0.20 11/07/2020    BASOSABS 0.07 11/07/2020         DIAGNOSTIC TESTING:     Ct Abdomen Pelvis W Iv Contrast    Result Date: 11/7/2020  EXAMINATION: CT OF THE ABDOMEN AND PELVIS WITH CONTRAST 11/7/2020 5:58 am TECHNIQUE: CT of the abdomen and pelvis was performed with the administration of intravenous contrast. Multiplanar reformatted images are provided for review. Dose modulation, iterative reconstruction, and/or weight based adjustment of the mA/kV was utilized to reduce the radiation dose to as low as reasonably achievable. COMPARISON: Multiple prior examinations most recently 03/17/2020 HISTORY: Reason for Exam: abd pain, prior surgical hx, hematemesis Acuity: Acute Type of Exam: Initial FINDINGS: Lower Chest: Normal heart size. Left greater than right bibasilar subsegmental atelectasis and/or scarring. Wall thickening involving the distal esophagus and moderate size hiatal hernia which are fluid-filled. Adjacent subcentimeter distal paraesophageal lymph node. Organs: Hepatic fatty infiltration. Spleen, pancreas and right adrenal gland appear unremarkable. Stable slight nodular thickening of the left adrenal gland. Prior right nephrectomy and cholecystectomy. Common bile duct stent is in place. GI/Bowel: No evidence of bowel obstruction or inflammation. Large amount of stool in the rectosigmoid which may be correlated for constipation. G-tube is in place.  Pelvis: Bladder is grossly unremarkable. Hysterectomy. Peritoneum/Retroperitoneum: Normal caliber abdominal aorta moderate to severe infrarenal atherosclerosis. No suspicious lymphadenopathy. No ascites or free air. Bones/Soft Tissues: Stable scoliosis and multilevel degenerative change of the lumbar and visualized thoracic spine. Stable right hip arthroplasty. *Wall thickening involving the distal esophagus and moderate size hiatal hernia which are fluid-filled suggesting esophagitis/gastritis etiology of which may be infectious or inflammatory versus less likely neoplastic. Adjacent subcentimeter distal paraesophageal lymph node which may be reactive in nature. Recommend GI consultation and consider further assessment with endoscopy as clinically warranted. *Numerous incidental and chronic/postsurgical findings as detailed above. Xr Chest Portable    Result Date: 11/7/2020  EXAMINATION: ONE XRAY VIEW OF THE CHEST 11/7/2020 5:13 am COMPARISON: May 30, 2020. HISTORY: ORDERING SYSTEM PROVIDED HISTORY: cp, abd pain TECHNOLOGIST PROVIDED HISTORY: cp, abd pain Reason for Exam: portable upright/ c/o chest pain, sob and abdominal pain Acuity: Acute Type of Exam: Initial FINDINGS: Frontal portable view of the chest.  Patient rotation to the left. Normal lung volume. Prominent pulmonary vasculature. No focal consolidation. No pleural effusion or pneumothorax. Grossly stable cardiomediastinal silhouette and great vessels. Stable regional skeleton. Cardiomegaly and pulmonary vascular congestion. No focal consolidation. Follow-up PA and lateral chest radiographs may be helpful for further evaluation as warranted. PHYSICAL EXAMINATION:   Phone visit      IMPRESSION: Ms. Kendrick Magana is a 76 y.o. female with      Diagnosis Orders   1. Esophageal dilatation  EGD   2. Acute upper GI bleed     3. Gastric outlet obstruction     4. S/P Nissen fundoplication (without gastrostomy tube) procedure     5.  Esophageal dysphagia to occasional typographical errors. Please contact our office if you have any questions. An electronic signature was used to authenticate this note.         Luke Lee MD  [facility]  O: #[phone]

## 2020-12-01 NOTE — TELEPHONE ENCOUNTER
Check out for VV 11/30/20. EGD and ERCP ord not scheduled as patient would like to wait until 2021 due to covid concerns. Writer advised patient we will contact her in 2021 to schedule. Patient voiced understanding. 3-month RTC recall placed in chart.

## 2020-12-13 ENCOUNTER — APPOINTMENT (OUTPATIENT)
Dept: GENERAL RADIOLOGY | Age: 75
DRG: 871 | End: 2020-12-13
Payer: MEDICARE

## 2020-12-13 ENCOUNTER — HOSPITAL ENCOUNTER (INPATIENT)
Age: 75
LOS: 3 days | Discharge: NURSING, REHAB OR LONG-TERM CARE | DRG: 871 | End: 2020-12-16
Attending: EMERGENCY MEDICINE | Admitting: INTERNAL MEDICINE
Payer: MEDICARE

## 2020-12-13 ENCOUNTER — APPOINTMENT (OUTPATIENT)
Dept: CT IMAGING | Age: 75
DRG: 871 | End: 2020-12-13
Payer: MEDICARE

## 2020-12-13 PROBLEM — A41.9 SEPSIS (HCC): Status: ACTIVE | Noted: 2020-12-13

## 2020-12-13 LAB
% CKMB: 1.3 % (ref 0–3)
-: ABNORMAL
ABSOLUTE EOS #: 0 K/UL (ref 0–0.4)
ABSOLUTE IMMATURE GRANULOCYTE: 0.19 K/UL (ref 0–0.3)
ABSOLUTE LYMPH #: 0.19 K/UL (ref 1–4.8)
ABSOLUTE MONO #: 0.28 K/UL (ref 0.1–0.8)
AMORPHOUS: ABNORMAL
AMPHETAMINE SCREEN URINE: NEGATIVE
ANION GAP SERPL CALCULATED.3IONS-SCNC: 15 MMOL/L (ref 9–17)
BACTERIA: ABNORMAL
BARBITURATE SCREEN URINE: NEGATIVE
BASOPHILS # BLD: 1 % (ref 0–2)
BASOPHILS ABSOLUTE: 0.05 K/UL (ref 0–0.2)
BENZODIAZEPINE SCREEN, URINE: NEGATIVE
BILIRUBIN URINE: NEGATIVE
BUN BLDV-MCNC: 13 MG/DL (ref 8–23)
BUN/CREAT BLD: ABNORMAL (ref 9–20)
BUPRENORPHINE URINE: NORMAL
CALCIUM SERPL-MCNC: 8.6 MG/DL (ref 8.6–10.4)
CANNABINOID SCREEN URINE: NEGATIVE
CASTS UA: ABNORMAL /LPF (ref 0–8)
CHLORIDE BLD-SCNC: 95 MMOL/L (ref 98–107)
CK MB: 1.1 NG/ML
CKMB INTERPRETATION: ABNORMAL
CO2: 21 MMOL/L (ref 20–31)
COCAINE METABOLITE, URINE: NEGATIVE
COLOR: YELLOW
COMMENT UA: ABNORMAL
CREAT SERPL-MCNC: 1.23 MG/DL (ref 0.5–0.9)
CRYSTALS, UA: ABNORMAL /HPF
DIFFERENTIAL TYPE: ABNORMAL
EOSINOPHILS RELATIVE PERCENT: 0 % (ref 1–4)
EPITHELIAL CELLS UA: ABNORMAL /HPF (ref 0–5)
GFR AFRICAN AMERICAN: 52 ML/MIN
GFR NON-AFRICAN AMERICAN: 43 ML/MIN
GFR SERPL CREATININE-BSD FRML MDRD: ABNORMAL ML/MIN/{1.73_M2}
GFR SERPL CREATININE-BSD FRML MDRD: ABNORMAL ML/MIN/{1.73_M2}
GLUCOSE BLD-MCNC: 148 MG/DL (ref 70–99)
GLUCOSE URINE: NEGATIVE
HCT VFR BLD CALC: 39.2 % (ref 36.3–47.1)
HEMOGLOBIN: 12.2 G/DL (ref 11.9–15.1)
IMMATURE GRANULOCYTES: 4 %
INR BLD: 1
INR BLD: 1
KETONES, URINE: NEGATIVE
LACTIC ACID, SEPSIS WHOLE BLOOD: 1.2 MMOL/L (ref 0.5–1.9)
LACTIC ACID, SEPSIS: NORMAL MMOL/L (ref 0.5–1.9)
LACTIC ACID, WHOLE BLOOD: 3 MMOL/L (ref 0.7–2.1)
LEUKOCYTE ESTERASE, URINE: ABNORMAL
LYMPHOCYTES # BLD: 4 % (ref 24–44)
MCH RBC QN AUTO: 27.4 PG (ref 25.2–33.5)
MCHC RBC AUTO-ENTMCNC: 31.1 G/DL (ref 28.4–34.8)
MCV RBC AUTO: 88.1 FL (ref 82.6–102.9)
MDMA URINE: NORMAL
METHADONE SCREEN, URINE: NEGATIVE
METHAMPHETAMINE, URINE: NORMAL
MONOCYTES # BLD: 6 % (ref 1–7)
MORPHOLOGY: ABNORMAL
MUCUS: ABNORMAL
MYOGLOBIN: 74 NG/ML (ref 25–58)
NITRITE, URINE: NEGATIVE
NRBC AUTOMATED: 0 PER 100 WBC
OPIATES, URINE: NEGATIVE
OTHER OBSERVATIONS UA: ABNORMAL
OXYCODONE SCREEN URINE: NEGATIVE
PARTIAL THROMBOPLASTIN TIME: 24.8 SEC (ref 20.5–30.5)
PARTIAL THROMBOPLASTIN TIME: 27.4 SEC (ref 20.5–30.5)
PDW BLD-RTO: 16 % (ref 11.8–14.4)
PH UA: 6 (ref 5–8)
PHENCYCLIDINE, URINE: NEGATIVE
PLATELET # BLD: 167 K/UL (ref 138–453)
PLATELET ESTIMATE: ABNORMAL
PMV BLD AUTO: 11.2 FL (ref 8.1–13.5)
POTASSIUM SERPL-SCNC: 3.2 MMOL/L (ref 3.7–5.3)
PROCALCITONIN: 0.32 NG/ML
PROLACTIN: 61.09 UG/L (ref 4.79–23.3)
PROPOXYPHENE, URINE: NORMAL
PROTEIN UA: ABNORMAL
PROTHROMBIN TIME: 10.8 SEC (ref 9–12)
PROTHROMBIN TIME: 10.8 SEC (ref 9–12)
RBC # BLD: 4.45 M/UL (ref 3.95–5.11)
RBC # BLD: ABNORMAL 10*6/UL
RBC UA: ABNORMAL /HPF (ref 0–4)
RENAL EPITHELIAL, UA: ABNORMAL /HPF
SARS-COV-2, RAPID: NOT DETECTED
SARS-COV-2: NORMAL
SARS-COV-2: NORMAL
SEG NEUTROPHILS: 85 % (ref 36–66)
SEGMENTED NEUTROPHILS ABSOLUTE COUNT: 3.99 K/UL (ref 1.8–7.7)
SODIUM BLD-SCNC: 131 MMOL/L (ref 135–144)
SOURCE: NORMAL
SPECIFIC GRAVITY UA: 1.01 (ref 1–1.03)
TEST INFORMATION: NORMAL
TOTAL CK: 82 U/L (ref 26–192)
TRICHOMONAS: ABNORMAL
TRICYCLIC ANTIDEPRESSANTS, UR: NORMAL
TROPONIN INTERP: ABNORMAL
TROPONIN T: ABNORMAL NG/ML
TROPONIN, HIGH SENSITIVITY: 47 NG/L (ref 0–14)
TROPONIN, HIGH SENSITIVITY: 51 NG/L (ref 0–14)
TROPONIN, HIGH SENSITIVITY: 58 NG/L (ref 0–14)
TROPONIN, HIGH SENSITIVITY: 60 NG/L (ref 0–14)
TURBIDITY: CLEAR
URINE HGB: ABNORMAL
UROBILINOGEN, URINE: NORMAL
WBC # BLD: 4.7 K/UL (ref 3.5–11.3)
WBC # BLD: ABNORMAL 10*3/UL
WBC UA: ABNORMAL /HPF (ref 0–5)
YEAST: ABNORMAL

## 2020-12-13 PROCEDURE — 6360000002 HC RX W HCPCS: Performed by: STUDENT IN AN ORGANIZED HEALTH CARE EDUCATION/TRAINING PROGRAM

## 2020-12-13 PROCEDURE — 99222 1ST HOSP IP/OBS MODERATE 55: CPT | Performed by: PSYCHIATRY & NEUROLOGY

## 2020-12-13 PROCEDURE — 87077 CULTURE AEROBIC IDENTIFY: CPT

## 2020-12-13 PROCEDURE — 84484 ASSAY OF TROPONIN QUANT: CPT

## 2020-12-13 PROCEDURE — 84145 PROCALCITONIN (PCT): CPT

## 2020-12-13 PROCEDURE — 82550 ASSAY OF CK (CPK): CPT

## 2020-12-13 PROCEDURE — 83036 HEMOGLOBIN GLYCOSYLATED A1C: CPT

## 2020-12-13 PROCEDURE — 96365 THER/PROPH/DIAG IV INF INIT: CPT

## 2020-12-13 PROCEDURE — 70450 CT HEAD/BRAIN W/O DYE: CPT

## 2020-12-13 PROCEDURE — 2060000000 HC ICU INTERMEDIATE R&B

## 2020-12-13 PROCEDURE — 87040 BLOOD CULTURE FOR BACTERIA: CPT

## 2020-12-13 PROCEDURE — 6370000000 HC RX 637 (ALT 250 FOR IP): Performed by: STUDENT IN AN ORGANIZED HEALTH CARE EDUCATION/TRAINING PROGRAM

## 2020-12-13 PROCEDURE — U0002 COVID-19 LAB TEST NON-CDC: HCPCS

## 2020-12-13 PROCEDURE — 36415 COLL VENOUS BLD VENIPUNCTURE: CPT

## 2020-12-13 PROCEDURE — U0003 INFECTIOUS AGENT DETECTION BY NUCLEIC ACID (DNA OR RNA); SEVERE ACUTE RESPIRATORY SYNDROME CORONAVIRUS 2 (SARS-COV-2) (CORONAVIRUS DISEASE [COVID-19]), AMPLIFIED PROBE TECHNIQUE, MAKING USE OF HIGH THROUGHPUT TECHNOLOGIES AS DESCRIBED BY CMS-2020-01-R: HCPCS

## 2020-12-13 PROCEDURE — 80307 DRUG TEST PRSMV CHEM ANLYZR: CPT

## 2020-12-13 PROCEDURE — 87186 SC STD MICRODIL/AGAR DIL: CPT

## 2020-12-13 PROCEDURE — 84146 ASSAY OF PROLACTIN: CPT

## 2020-12-13 PROCEDURE — 87086 URINE CULTURE/COLONY COUNT: CPT

## 2020-12-13 PROCEDURE — 85025 COMPLETE CBC W/AUTO DIFF WBC: CPT

## 2020-12-13 PROCEDURE — 82553 CREATINE MB FRACTION: CPT

## 2020-12-13 PROCEDURE — 80048 BASIC METABOLIC PNL TOTAL CA: CPT

## 2020-12-13 PROCEDURE — 96374 THER/PROPH/DIAG INJ IV PUSH: CPT

## 2020-12-13 PROCEDURE — 85730 THROMBOPLASTIN TIME PARTIAL: CPT

## 2020-12-13 PROCEDURE — 83605 ASSAY OF LACTIC ACID: CPT

## 2020-12-13 PROCEDURE — 6360000002 HC RX W HCPCS: Performed by: INTERNAL MEDICINE

## 2020-12-13 PROCEDURE — 83874 ASSAY OF MYOGLOBIN: CPT

## 2020-12-13 PROCEDURE — 81001 URINALYSIS AUTO W/SCOPE: CPT

## 2020-12-13 PROCEDURE — 2580000003 HC RX 258: Performed by: STUDENT IN AN ORGANIZED HEALTH CARE EDUCATION/TRAINING PROGRAM

## 2020-12-13 PROCEDURE — 71045 X-RAY EXAM CHEST 1 VIEW: CPT

## 2020-12-13 PROCEDURE — 85610 PROTHROMBIN TIME: CPT

## 2020-12-13 PROCEDURE — 93005 ELECTROCARDIOGRAM TRACING: CPT | Performed by: STUDENT IN AN ORGANIZED HEALTH CARE EDUCATION/TRAINING PROGRAM

## 2020-12-13 PROCEDURE — 99285 EMERGENCY DEPT VISIT HI MDM: CPT

## 2020-12-13 RX ORDER — LEVOFLOXACIN 5 MG/ML
500 INJECTION, SOLUTION INTRAVENOUS ONCE
Status: COMPLETED | OUTPATIENT
Start: 2020-12-13 | End: 2020-12-13

## 2020-12-13 RX ORDER — POTASSIUM CHLORIDE 20 MEQ/1
40 TABLET, EXTENDED RELEASE ORAL ONCE
Status: COMPLETED | OUTPATIENT
Start: 2020-12-13 | End: 2020-12-13

## 2020-12-13 RX ORDER — NITROFURANTOIN 25; 75 MG/1; MG/1
100 CAPSULE ORAL ONCE
Status: DISCONTINUED | OUTPATIENT
Start: 2020-12-13 | End: 2020-12-14

## 2020-12-13 RX ORDER — 0.9 % SODIUM CHLORIDE 0.9 %
1000 INTRAVENOUS SOLUTION INTRAVENOUS ONCE
Status: COMPLETED | OUTPATIENT
Start: 2020-12-13 | End: 2020-12-13

## 2020-12-13 RX ORDER — METOCLOPRAMIDE HYDROCHLORIDE 5 MG/ML
5 INJECTION INTRAMUSCULAR; INTRAVENOUS ONCE
Status: COMPLETED | OUTPATIENT
Start: 2020-12-13 | End: 2020-12-13

## 2020-12-13 RX ORDER — MAGNESIUM SULFATE IN WATER 40 MG/ML
2 INJECTION, SOLUTION INTRAVENOUS ONCE
Status: COMPLETED | OUTPATIENT
Start: 2020-12-13 | End: 2020-12-13

## 2020-12-13 RX ORDER — KETOROLAC TROMETHAMINE 15 MG/ML
15 INJECTION, SOLUTION INTRAMUSCULAR; INTRAVENOUS ONCE
Status: COMPLETED | OUTPATIENT
Start: 2020-12-13 | End: 2020-12-13

## 2020-12-13 RX ADMIN — SODIUM CHLORIDE 1000 ML: 9 INJECTION, SOLUTION INTRAVENOUS at 20:52

## 2020-12-13 RX ADMIN — MAGNESIUM SULFATE 2 G: 2 INJECTION INTRAVENOUS at 20:21

## 2020-12-13 RX ADMIN — POTASSIUM CHLORIDE 40 MEQ: 1500 TABLET, EXTENDED RELEASE ORAL at 20:54

## 2020-12-13 RX ADMIN — LEVOFLOXACIN 500 MG: 5 INJECTION, SOLUTION INTRAVENOUS at 22:54

## 2020-12-13 RX ADMIN — SODIUM CHLORIDE 1000 ML: 9 INJECTION, SOLUTION INTRAVENOUS at 22:44

## 2020-12-13 RX ADMIN — KETOROLAC TROMETHAMINE 15 MG: 15 INJECTION, SOLUTION INTRAMUSCULAR; INTRAVENOUS at 21:04

## 2020-12-13 RX ADMIN — METOCLOPRAMIDE 5 MG: 5 INJECTION, SOLUTION INTRAMUSCULAR; INTRAVENOUS at 21:04

## 2020-12-13 ASSESSMENT — PAIN DESCRIPTION - LOCATION
LOCATION: HEAD
LOCATION: ABDOMEN

## 2020-12-13 ASSESSMENT — PAIN SCALES - GENERAL
PAINLEVEL_OUTOF10: 2
PAINLEVEL_OUTOF10: 5

## 2020-12-13 ASSESSMENT — PAIN DESCRIPTION - PAIN TYPE: TYPE: ACUTE PAIN

## 2020-12-14 ENCOUNTER — APPOINTMENT (OUTPATIENT)
Dept: MRI IMAGING | Age: 75
DRG: 871 | End: 2020-12-14
Payer: MEDICARE

## 2020-12-14 PROBLEM — R47.01 EXPRESSIVE APHASIA: Status: ACTIVE | Noted: 2020-12-14

## 2020-12-14 LAB
ALBUMIN SERPL-MCNC: 3.2 G/DL (ref 3.5–5.2)
ALBUMIN/GLOBULIN RATIO: 1 (ref 1–2.5)
ALP BLD-CCNC: 115 U/L (ref 35–104)
ALT SERPL-CCNC: 19 U/L (ref 5–33)
ANION GAP SERPL CALCULATED.3IONS-SCNC: 10 MMOL/L (ref 9–17)
AST SERPL-CCNC: 28 U/L
BILIRUB SERPL-MCNC: 0.2 MG/DL (ref 0.3–1.2)
BUN BLDV-MCNC: 10 MG/DL (ref 8–23)
BUN/CREAT BLD: ABNORMAL (ref 9–20)
CALCIUM IONIZED: 1.09 MMOL/L (ref 1.13–1.33)
CALCIUM SERPL-MCNC: 8.4 MG/DL (ref 8.6–10.4)
CHLORIDE BLD-SCNC: 101 MMOL/L (ref 98–107)
CHOLESTEROL/HDL RATIO: 4.7
CHOLESTEROL: 203 MG/DL
CO2: 24 MMOL/L (ref 20–31)
CREAT SERPL-MCNC: 0.98 MG/DL (ref 0.5–0.9)
EKG ATRIAL RATE: 74 BPM
EKG ATRIAL RATE: 88 BPM
EKG P AXIS: 17 DEGREES
EKG P AXIS: 50 DEGREES
EKG P-R INTERVAL: 150 MS
EKG P-R INTERVAL: 176 MS
EKG Q-T INTERVAL: 408 MS
EKG Q-T INTERVAL: 422 MS
EKG QRS DURATION: 76 MS
EKG QRS DURATION: 82 MS
EKG QTC CALCULATION (BAZETT): 468 MS
EKG QTC CALCULATION (BAZETT): 493 MS
EKG R AXIS: 21 DEGREES
EKG R AXIS: 45 DEGREES
EKG T AXIS: 14 DEGREES
EKG T AXIS: 44 DEGREES
EKG VENTRICULAR RATE: 74 BPM
EKG VENTRICULAR RATE: 88 BPM
ESTIMATED AVERAGE GLUCOSE: 94 MG/DL
ESTIMATED AVERAGE GLUCOSE: 97 MG/DL
GFR AFRICAN AMERICAN: >60 ML/MIN
GFR NON-AFRICAN AMERICAN: 55 ML/MIN
GFR SERPL CREATININE-BSD FRML MDRD: ABNORMAL ML/MIN/{1.73_M2}
GFR SERPL CREATININE-BSD FRML MDRD: ABNORMAL ML/MIN/{1.73_M2}
GLUCOSE BLD-MCNC: 128 MG/DL (ref 70–99)
HBA1C MFR BLD: 4.9 % (ref 4–6)
HBA1C MFR BLD: 5 % (ref 4–6)
HCT VFR BLD CALC: 39.4 % (ref 36.3–47.1)
HDLC SERPL-MCNC: 43 MG/DL
HEMOGLOBIN: 11.9 G/DL (ref 11.9–15.1)
INR BLD: 1
LACTIC ACID, SEPSIS WHOLE BLOOD: 1.7 MMOL/L (ref 0.5–1.9)
LACTIC ACID, SEPSIS WHOLE BLOOD: 1.9 MMOL/L (ref 0.5–1.9)
LACTIC ACID, SEPSIS: NORMAL MMOL/L (ref 0.5–1.9)
LACTIC ACID, SEPSIS: NORMAL MMOL/L (ref 0.5–1.9)
LDL CHOLESTEROL: 106 MG/DL (ref 0–130)
MAGNESIUM: 1.8 MG/DL (ref 1.6–2.6)
MCH RBC QN AUTO: 27.2 PG (ref 25.2–33.5)
MCHC RBC AUTO-ENTMCNC: 30.2 G/DL (ref 28.4–34.8)
MCV RBC AUTO: 90 FL (ref 82.6–102.9)
MYOGLOBIN: 35 NG/ML (ref 25–58)
MYOGLOBIN: 38 NG/ML (ref 25–58)
MYOGLOBIN: 53 NG/ML (ref 25–58)
NRBC AUTOMATED: 0 PER 100 WBC
PDW BLD-RTO: 16.2 % (ref 11.8–14.4)
PHOSPHORUS: 2.3 MG/DL (ref 2.6–4.5)
PLATELET # BLD: ABNORMAL K/UL (ref 138–453)
PLATELET, FLUORESCENCE: 131 K/UL (ref 138–453)
PLATELET, IMMATURE FRACTION: 4.2 % (ref 1.1–10.3)
PMV BLD AUTO: ABNORMAL FL (ref 8.1–13.5)
POTASSIUM SERPL-SCNC: 4 MMOL/L (ref 3.7–5.3)
PROTHROMBIN TIME: 10.5 SEC (ref 9–12)
RBC # BLD: 4.38 M/UL (ref 3.95–5.11)
SODIUM BLD-SCNC: 135 MMOL/L (ref 135–144)
TOTAL PROTEIN: 6.3 G/DL (ref 6.4–8.3)
TRIGL SERPL-MCNC: 268 MG/DL
TROPONIN INTERP: ABNORMAL
TROPONIN T: ABNORMAL NG/ML
TROPONIN, HIGH SENSITIVITY: 36 NG/L (ref 0–14)
TROPONIN, HIGH SENSITIVITY: 54 NG/L (ref 0–14)
TROPONIN, HIGH SENSITIVITY: 57 NG/L (ref 0–14)
VLDLC SERPL CALC-MCNC: ABNORMAL MG/DL (ref 1–30)
WBC # BLD: 3.5 K/UL (ref 3.5–11.3)

## 2020-12-14 PROCEDURE — 83874 ASSAY OF MYOGLOBIN: CPT

## 2020-12-14 PROCEDURE — 99222 1ST HOSP IP/OBS MODERATE 55: CPT | Performed by: INTERNAL MEDICINE

## 2020-12-14 PROCEDURE — 6370000000 HC RX 637 (ALT 250 FOR IP): Performed by: NURSE PRACTITIONER

## 2020-12-14 PROCEDURE — 80061 LIPID PANEL: CPT

## 2020-12-14 PROCEDURE — 2580000003 HC RX 258: Performed by: NURSE PRACTITIONER

## 2020-12-14 PROCEDURE — 97166 OT EVAL MOD COMPLEX 45 MIN: CPT

## 2020-12-14 PROCEDURE — 83036 HEMOGLOBIN GLYCOSYLATED A1C: CPT

## 2020-12-14 PROCEDURE — 99223 1ST HOSP IP/OBS HIGH 75: CPT | Performed by: NURSE PRACTITIONER

## 2020-12-14 PROCEDURE — 84484 ASSAY OF TROPONIN QUANT: CPT

## 2020-12-14 PROCEDURE — 84100 ASSAY OF PHOSPHORUS: CPT

## 2020-12-14 PROCEDURE — 85610 PROTHROMBIN TIME: CPT

## 2020-12-14 PROCEDURE — 83735 ASSAY OF MAGNESIUM: CPT

## 2020-12-14 PROCEDURE — 85055 RETICULATED PLATELET ASSAY: CPT

## 2020-12-14 PROCEDURE — 6360000002 HC RX W HCPCS: Performed by: INTERNAL MEDICINE

## 2020-12-14 PROCEDURE — 95816 EEG AWAKE AND DROWSY: CPT

## 2020-12-14 PROCEDURE — 2580000003 HC RX 258: Performed by: INTERNAL MEDICINE

## 2020-12-14 PROCEDURE — 70551 MRI BRAIN STEM W/O DYE: CPT

## 2020-12-14 PROCEDURE — 2060000000 HC ICU INTERMEDIATE R&B

## 2020-12-14 PROCEDURE — 70547 MR ANGIOGRAPHY NECK W/O DYE: CPT

## 2020-12-14 PROCEDURE — 93010 ELECTROCARDIOGRAM REPORT: CPT | Performed by: INTERNAL MEDICINE

## 2020-12-14 PROCEDURE — 6360000002 HC RX W HCPCS: Performed by: NURSE PRACTITIONER

## 2020-12-14 PROCEDURE — 83605 ASSAY OF LACTIC ACID: CPT

## 2020-12-14 PROCEDURE — 99223 1ST HOSP IP/OBS HIGH 75: CPT | Performed by: PSYCHIATRY & NEUROLOGY

## 2020-12-14 PROCEDURE — 85027 COMPLETE CBC AUTOMATED: CPT

## 2020-12-14 PROCEDURE — 97535 SELF CARE MNGMENT TRAINING: CPT

## 2020-12-14 PROCEDURE — 6370000000 HC RX 637 (ALT 250 FOR IP): Performed by: INTERNAL MEDICINE

## 2020-12-14 PROCEDURE — 95816 EEG AWAKE AND DROWSY: CPT | Performed by: PSYCHIATRY & NEUROLOGY

## 2020-12-14 PROCEDURE — 80053 COMPREHEN METABOLIC PANEL: CPT

## 2020-12-14 PROCEDURE — 70544 MR ANGIOGRAPHY HEAD W/O DYE: CPT

## 2020-12-14 PROCEDURE — 92523 SPEECH SOUND LANG COMPREHEN: CPT

## 2020-12-14 PROCEDURE — 82330 ASSAY OF CALCIUM: CPT

## 2020-12-14 RX ORDER — ACETAMINOPHEN 325 MG/1
650 TABLET ORAL EVERY 6 HOURS PRN
Status: DISCONTINUED | OUTPATIENT
Start: 2020-12-14 | End: 2020-12-16 | Stop reason: HOSPADM

## 2020-12-14 RX ORDER — ASPIRIN 300 MG/1
300 SUPPOSITORY RECTAL DAILY
Status: DISCONTINUED | OUTPATIENT
Start: 2020-12-14 | End: 2020-12-16 | Stop reason: HOSPADM

## 2020-12-14 RX ORDER — CALCIUM CARBONATE 200(500)MG
500 TABLET,CHEWABLE ORAL 3 TIMES DAILY PRN
Status: DISCONTINUED | OUTPATIENT
Start: 2020-12-14 | End: 2020-12-16 | Stop reason: HOSPADM

## 2020-12-14 RX ORDER — ACETAMINOPHEN 650 MG/1
650 SUPPOSITORY RECTAL EVERY 6 HOURS PRN
Status: DISCONTINUED | OUTPATIENT
Start: 2020-12-14 | End: 2020-12-16 | Stop reason: HOSPADM

## 2020-12-14 RX ORDER — LEVOFLOXACIN 5 MG/ML
250 INJECTION, SOLUTION INTRAVENOUS EVERY 24 HOURS
Status: DISCONTINUED | OUTPATIENT
Start: 2020-12-14 | End: 2020-12-14

## 2020-12-14 RX ORDER — NITROFURANTOIN 25; 75 MG/1; MG/1
100 CAPSULE ORAL EVERY 12 HOURS SCHEDULED
Status: DISCONTINUED | OUTPATIENT
Start: 2020-12-14 | End: 2020-12-14

## 2020-12-14 RX ORDER — FUROSEMIDE 20 MG/1
20 TABLET ORAL DAILY
Status: DISCONTINUED | OUTPATIENT
Start: 2020-12-14 | End: 2020-12-16 | Stop reason: HOSPADM

## 2020-12-14 RX ORDER — PANTOPRAZOLE SODIUM 40 MG/1
40 TABLET, DELAYED RELEASE ORAL
Status: DISCONTINUED | OUTPATIENT
Start: 2020-12-15 | End: 2020-12-16 | Stop reason: HOSPADM

## 2020-12-14 RX ORDER — LANOLIN ALCOHOL/MO/W.PET/CERES
325 CREAM (GRAM) TOPICAL
Status: DISCONTINUED | OUTPATIENT
Start: 2020-12-14 | End: 2020-12-16 | Stop reason: HOSPADM

## 2020-12-14 RX ORDER — GABAPENTIN 600 MG/1
300 TABLET ORAL 3 TIMES DAILY
Status: DISCONTINUED | OUTPATIENT
Start: 2020-12-14 | End: 2020-12-16 | Stop reason: HOSPADM

## 2020-12-14 RX ORDER — BUSPIRONE HYDROCHLORIDE 10 MG/1
10 TABLET ORAL 3 TIMES DAILY
Status: DISCONTINUED | OUTPATIENT
Start: 2020-12-14 | End: 2020-12-16 | Stop reason: HOSPADM

## 2020-12-14 RX ORDER — QUETIAPINE FUMARATE 25 MG/1
25 TABLET, FILM COATED ORAL 2 TIMES DAILY
Status: DISCONTINUED | OUTPATIENT
Start: 2020-12-14 | End: 2020-12-16 | Stop reason: HOSPADM

## 2020-12-14 RX ORDER — CLONAZEPAM 0.5 MG/1
0.5 TABLET ORAL 2 TIMES DAILY
Status: DISCONTINUED | OUTPATIENT
Start: 2020-12-14 | End: 2020-12-16 | Stop reason: HOSPADM

## 2020-12-14 RX ORDER — DOCUSATE SODIUM 100 MG/1
100 CAPSULE, LIQUID FILLED ORAL DAILY
Status: DISCONTINUED | OUTPATIENT
Start: 2020-12-14 | End: 2020-12-16 | Stop reason: HOSPADM

## 2020-12-14 RX ORDER — ONDANSETRON 2 MG/ML
4 INJECTION INTRAMUSCULAR; INTRAVENOUS EVERY 6 HOURS PRN
Status: DISCONTINUED | OUTPATIENT
Start: 2020-12-14 | End: 2020-12-16 | Stop reason: HOSPADM

## 2020-12-14 RX ORDER — SODIUM CHLORIDE 0.9 % (FLUSH) 0.9 %
10 SYRINGE (ML) INJECTION EVERY 12 HOURS SCHEDULED
Status: DISCONTINUED | OUTPATIENT
Start: 2020-12-14 | End: 2020-12-16 | Stop reason: HOSPADM

## 2020-12-14 RX ORDER — SODIUM CHLORIDE 0.9 % (FLUSH) 0.9 %
10 SYRINGE (ML) INJECTION PRN
Status: DISCONTINUED | OUTPATIENT
Start: 2020-12-14 | End: 2020-12-14

## 2020-12-14 RX ORDER — SULFAMETHOXAZOLE AND TRIMETHOPRIM 800; 160 MG/1; MG/1
1 TABLET ORAL EVERY 12 HOURS SCHEDULED
Status: DISCONTINUED | OUTPATIENT
Start: 2020-12-14 | End: 2020-12-14

## 2020-12-14 RX ORDER — ATORVASTATIN CALCIUM 80 MG/1
40 TABLET, FILM COATED ORAL NIGHTLY
Status: DISCONTINUED | OUTPATIENT
Start: 2020-12-14 | End: 2020-12-16 | Stop reason: HOSPADM

## 2020-12-14 RX ORDER — TRAZODONE HYDROCHLORIDE 100 MG/1
100 TABLET ORAL NIGHTLY
Status: DISCONTINUED | OUTPATIENT
Start: 2020-12-14 | End: 2020-12-14

## 2020-12-14 RX ORDER — METOPROLOL SUCCINATE 25 MG/1
25 TABLET, EXTENDED RELEASE ORAL DAILY
Status: DISCONTINUED | OUTPATIENT
Start: 2020-12-14 | End: 2020-12-16 | Stop reason: HOSPADM

## 2020-12-14 RX ORDER — ASPIRIN 81 MG/1
81 TABLET ORAL DAILY
Status: DISCONTINUED | OUTPATIENT
Start: 2020-12-14 | End: 2020-12-16 | Stop reason: HOSPADM

## 2020-12-14 RX ORDER — TRAZODONE HYDROCHLORIDE 100 MG/1
100 TABLET ORAL NIGHTLY
Status: DISCONTINUED | OUTPATIENT
Start: 2020-12-14 | End: 2020-12-16 | Stop reason: HOSPADM

## 2020-12-14 RX ORDER — SODIUM CHLORIDE 9 MG/ML
INJECTION, SOLUTION INTRAVENOUS CONTINUOUS
Status: DISCONTINUED | OUTPATIENT
Start: 2020-12-14 | End: 2020-12-15

## 2020-12-14 RX ORDER — ONDANSETRON 4 MG/1
4 TABLET, ORALLY DISINTEGRATING ORAL EVERY 8 HOURS PRN
Status: DISCONTINUED | OUTPATIENT
Start: 2020-12-14 | End: 2020-12-16 | Stop reason: HOSPADM

## 2020-12-14 RX ORDER — AMITRIPTYLINE HYDROCHLORIDE 25 MG/1
25 TABLET, FILM COATED ORAL NIGHTLY
Status: DISCONTINUED | OUTPATIENT
Start: 2020-12-14 | End: 2020-12-16 | Stop reason: HOSPADM

## 2020-12-14 RX ORDER — SUCRALFATE 1 G/1
1 TABLET ORAL EVERY 6 HOURS SCHEDULED
Status: DISCONTINUED | OUTPATIENT
Start: 2020-12-14 | End: 2020-12-16 | Stop reason: HOSPADM

## 2020-12-14 RX ADMIN — CLONAZEPAM 0.5 MG: 0.5 TABLET ORAL at 10:55

## 2020-12-14 RX ADMIN — METOPROLOL SUCCINATE 25 MG: 25 TABLET, FILM COATED, EXTENDED RELEASE ORAL at 10:55

## 2020-12-14 RX ADMIN — FERROUS SULFATE TAB EC 325 MG (65 MG FE EQUIVALENT) 325 MG: 325 (65 FE) TABLET DELAYED RESPONSE at 10:55

## 2020-12-14 RX ADMIN — BUSPIRONE HYDROCHLORIDE 10 MG: 10 TABLET ORAL at 20:49

## 2020-12-14 RX ADMIN — SODIUM CHLORIDE, PRESERVATIVE FREE 10 ML: 5 INJECTION INTRAVENOUS at 10:58

## 2020-12-14 RX ADMIN — QUETIAPINE FUMARATE 25 MG: 25 TABLET ORAL at 20:49

## 2020-12-14 RX ADMIN — MEROPENEM 1 G: 1 INJECTION, POWDER, FOR SOLUTION INTRAVENOUS at 13:27

## 2020-12-14 RX ADMIN — ANTACID TABLETS 500 MG: 500 TABLET, CHEWABLE ORAL at 10:55

## 2020-12-14 RX ADMIN — SODIUM CHLORIDE: 9 INJECTION, SOLUTION INTRAVENOUS at 01:51

## 2020-12-14 RX ADMIN — ATORVASTATIN CALCIUM 40 MG: 80 TABLET, FILM COATED ORAL at 20:49

## 2020-12-14 RX ADMIN — BUSPIRONE HYDROCHLORIDE 10 MG: 10 TABLET ORAL at 10:56

## 2020-12-14 RX ADMIN — Medication 81 MG: at 09:49

## 2020-12-14 RX ADMIN — AMITRIPTYLINE HYDROCHLORIDE 25 MG: 25 TABLET, FILM COATED ORAL at 21:30

## 2020-12-14 RX ADMIN — GABAPENTIN 300 MG: 600 TABLET ORAL at 10:56

## 2020-12-14 RX ADMIN — SODIUM CHLORIDE, PRESERVATIVE FREE 10 ML: 5 INJECTION INTRAVENOUS at 23:39

## 2020-12-14 RX ADMIN — ANTACID TABLETS 500 MG: 500 TABLET, CHEWABLE ORAL at 21:30

## 2020-12-14 RX ADMIN — SUCRALFATE 1 G: 1 TABLET ORAL at 17:45

## 2020-12-14 RX ADMIN — ENOXAPARIN SODIUM 40 MG: 40 INJECTION SUBCUTANEOUS at 09:49

## 2020-12-14 RX ADMIN — CLONAZEPAM 0.5 MG: 0.5 TABLET ORAL at 20:48

## 2020-12-14 RX ADMIN — QUETIAPINE FUMARATE 25 MG: 25 TABLET ORAL at 10:55

## 2020-12-14 RX ADMIN — MAGNESIUM HYDROXIDE 30 ML: 400 SUSPENSION ORAL at 14:06

## 2020-12-14 RX ADMIN — ACETAMINOPHEN 650 MG: 325 TABLET ORAL at 07:35

## 2020-12-14 RX ADMIN — TRAZODONE HYDROCHLORIDE 100 MG: 100 TABLET ORAL at 02:40

## 2020-12-14 RX ADMIN — MEROPENEM 1 G: 1 INJECTION, POWDER, FOR SOLUTION INTRAVENOUS at 21:55

## 2020-12-14 RX ADMIN — TRAZODONE HYDROCHLORIDE 100 MG: 100 TABLET ORAL at 21:30

## 2020-12-14 RX ADMIN — GABAPENTIN 300 MG: 600 TABLET ORAL at 20:49

## 2020-12-14 RX ADMIN — SULFAMETHOXAZOLE AND TRIMETHOPRIM 1 TABLET: 800; 160 TABLET ORAL at 10:56

## 2020-12-14 ASSESSMENT — ENCOUNTER SYMPTOMS
COUGH: 1
BACK PAIN: 0
DIARRHEA: 0
ABDOMINAL PAIN: 0
COLOR CHANGE: 0
RHINORRHEA: 0
TROUBLE SWALLOWING: 0
VOMITING: 0
SORE THROAT: 0
ABDOMINAL PAIN: 1
CHEST TIGHTNESS: 0
CONSTIPATION: 0
COUGH: 0
NAUSEA: 0
PHOTOPHOBIA: 0
WHEEZING: 0
SHORTNESS OF BREATH: 0

## 2020-12-14 ASSESSMENT — PAIN SCALES - GENERAL
PAINLEVEL_OUTOF10: 6
PAINLEVEL_OUTOF10: 8

## 2020-12-14 ASSESSMENT — PAIN DESCRIPTION - ORIENTATION: ORIENTATION: RIGHT;LEFT

## 2020-12-14 ASSESSMENT — PAIN DESCRIPTION - PAIN TYPE: TYPE: ACUTE PAIN

## 2020-12-14 ASSESSMENT — PAIN DESCRIPTION - LOCATION: LOCATION: ABDOMEN;KNEE

## 2020-12-14 NOTE — ED NOTES
Morning labs drawn and sent to pharmacy via tube system. Labs correctly labeled, dark green top sent on ice.      Nan Tolbert RN  12/14/20 0919

## 2020-12-14 NOTE — ED NOTES
Pt passed swallow screen without drooling, coughing, spitting  Pt provided meal tray     Lacy Sutton RN  12/14/20 0659

## 2020-12-14 NOTE — CONSULTS
Department of Endovascular Neurosurgery                                         Resident Consult Note    Reason for Consult:  Aphasia  Requesting Physician:  Junie Jerry DO  Endovascular Neurosurgeon:   []Dr. Amanda Rhodes  [x]Dr. Arnold Reyes    History Obtained From:  patient, electronic medical record    CHIEF COMPLAINT:       Expressive aphasia, unable to speak words. HISTORY OF PRESENT ILLNESS:       The patient is a 76 y.o. female with PMH of MVP, Migraines, GERD, UGIB, Hypotension (On Midodrine), 1 Kidney (born), Unknown Hx of atrial fibrillation who presents with transient event of expressive aphasia. Patient refer she lives at [facility] and today between 1:00PM-3:00PM she had a transient event of being unable to speak. Refer she was trying to tell something to one of the nurses and knew the word but they were not getting out. Refer it lasted less than 5 min. Patient refers this is the frst time this has happened. No weakness, numbness or any other focal deficits. Patient also mentioned that this morning she woke up with bifrontal headaches, that is characterize as pressure associated with nausea, no radiation and is 6/10 on intensity. Refer the frequency of headaches is a couple of them a week. Denies fever, chills, vomiting, SOB, chest pain, abdominal pain, urinary disturbances. Refer she is 90% wheelchair bound. Smoking: Quit 1 year ago but used to smoke 1PPD for 54 years.   Alcohol: Denies  Illicit Drugs: Denies     R Elig[address] [place] [address]: 1:00PM - 3:00PM 12/13/2020  NIHSS: 0  PAST MEDICAL HISTORY :       Past Medical History:        Diagnosis Date    Anxiety     Arthritis     Back pain     Bronchitis     Caffeine use     8 coffee / day    Carpal tunnel syndrome     Cataracts, bilateral     Constipation     Depression     Diarrhea     GERD (gastroesophageal reflux disease)     H/O gastric ulcer     Hyperlipidemia     Hypertension     Mumps     MVP (mitral valve prolapse)     Dr. Levi Barone in May 2019    Recurrent UTI     Dr. Yeimy Holcomb    Sinusitis     Trache[facility] in place [facility])     Ulcerative esophagitis     Wellness examination     Dr. Randa Kelly seen in Jan 2020       Past Surgical History:        Procedure Laterality Date    APPENDECTOMY     [address], LAPAROSCOPIC  05/22/2020     LAPAROSCOPIC ROBOTIC CHOLECYSTECTOMY, PYLOROPLASTY     CHOLECYSTECTOMY, LAPAROSCOPIC N/A 5/22/2020    XI LAPAROSCOPIC ROBOTIC CHOLECYSTECTOMY, PYLOROPLASTY performed by Ric Rogers MD at [address]      EGD  3/17/2020         ERCP  05/21/2020    with stent insertion, balloon dilation, sphinctereotomy    ERCP  5/21/2020    ** CASE IN OR WITY GI STAFF** ERCP STENT INSERTION performed by Sea Hein MD at [facility]    ERCP  5/21/2020    ** CASE IN OR WITH GI STAFF**ERCP SPHINCTER/PAPILLOTOMY performed by Sea Hein MD at [facility]    ERCP  5/21/2020    ** CASE IN OR WITH GI STAFF**ERCP DILATION BALLOON performed by Sea Hein MD at [address]      patricia IOL    GASTRIC FUNDOPLICATION N/A 9/51/1859    XI LAPAROSCOPIC ROBOTIC HIATAL HERNIA REPAIR, CHERYL FUNDOPLICATION performed by Ric Rogers MD at [facility] N/A 3/27/2020    EGD PEG TUBE PLACEMENT performed by Ric Rogers MD at [address] [facility] CATH POWER PICC TRIPLE  3/4/2020         HYSTERECTOMY      Abdominal    JOINT REPLACEMENT Right     right hip    OVARY REMOVAL      RHINOPLASTY      TONSILLECTOMY      TOTAL HIP ARTHROPLASTY      TOTAL NEPHRECTOMY      atrophic from infections, age 13   Cristy Thomas TRACHEOSTOMY N/A 3/27/2020    **ADD ON **WANTS 10:00AM **TRACHEOTOMY performed by Ric Rogers MD at [address] [facility] 4/2/2020    TRACHEOTOMY EXCHANGE performed by Ric Rogers MD at [address] [facility] 3/17/2020    BEDSIDE EGD ESOPHAGOGASTRODUODENOSCOPY (ICU) performed by Sanya Pathak MD at [address] [place] [address] 5/18/2020    EGD BIOPSY performed by Carlos Whittington MD at [address] [place] [address] ENDOSCOPY  5/18/2020    EGD DILATION BALLOON performed by Carlos Whittington MD at [address] [place] [address] N/A 7/6/2020    ** CASE IN O.R. WITH GI STAFF** EGD ESOPHAGOGASTRODUODENOSCOPY performed by Ryley Metz MD at [address] [place] [address] N/A 11/9/2020    EGD DIAGNOSTIC ONLY performed by Swetha Hart MD at [facility]       Social History:   Social History     Socioeconomic History    Marital status:       Spouse name: Not on file    Number of children: 2    Years of education: Not on file    Highest education level: Not on file   Occupational History    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   Social Needs    Financial resource strain: Not on file    Food insecurity     Worry: Not on file     Inability: Not on file   Spot[employer] needs     Medical: Not on file     Non-medical: Not on file   Tobacco Use    Smoking status: Former Smoker     Packs/day: 1.00     Years: 50.00     Pack years: 50.00     Types: Cigarettes    Smokeless tobacco: Never Used    Tobacco comment: quit 1 year ago    Substance and Sexual Activity    Alcohol use: No    Drug use: No    Sexual activity: Never   Lifestyle    Physical activity     Days per week: Not on file     Minutes per session: Not on file    Stress: Not on file   Relationships    Social connections     Talks on phone: Not on file     Gets together: Not on file     Attends Jewish service: Not on file     Active member of club or organization: Not on file     Attends meetings of clubs or organizations: Not on file     Relationship status: Not on file    Intimate partner violence     Fear of current or ex partner: Not on file     Emotionally abused: Not on file Physically abused: Not on file     Forced sexual activity: Not on file   Other Topics Concern    Not on file   Social History Narrative    Not on file       Family History:       Problem Relation Age of Onset    Cancer Mother         uterine    Heart Attack Mother     Heart Attack Father     Other Maternal Grandfather         foot gangrene    Other Paternal Grandmother         bleeding ulcers    Other Paternal Grandfather         lung cancer       Allergies:  Prednisone, Compazine [prochlorperazine maleate], Penicillin v potassium, and Penicillins    Home Medications:  Prior to Admission medications    Medication Sig Start Date End Date Taking? Authorizing Provider   metoclopramide (REGLAN) 5 MG tablet Take 5 mg by mouth 4 times daily    Historical Provider, MD   benzocaine (ORAJEL) 10 % mucosal gel Take by mouth as needed. Patient not taking: Reported on 11/30/2020 11/10/20   Melany Guerra DO   clonazePAM (KLONOPIN) 0.5 MG tablet Take 0.5 tablets by mouth 2 times daily for 60 days.  Taking 1/2 tablet BID 7/7/20 11/7/20  Xiomy Lr MD   pantoprazole (PROTONIX) 40 MG tablet Take 1 tablet by mouth 2 times daily 7/7/20   Xiomy Lr MD   ferrous sulfate (IRON 325) 325 (65 Fe) MG tablet Take 1 tablet by mouth 2 times daily 7/7/20   Xiomy Lr MD   amitriptyline (ELAVIL) 25 MG tablet Take 1 tablet by mouth nightly 7/14/20   Xiomy Lr MD   busPIRone (BUSPAR) 10 MG tablet Take 2 tablets by mouth 3 times daily 7/14/20   Xiomy Lr MD   escitalopram (LEXAPRO) 20 MG tablet Take 1 tablet by mouth daily 7/14/20   Xiomy Lr MD   midodrine (PROAMATINE) 5 MG tablet Take 1 tablet by mouth 3 times daily as needed (if SBP < 100)  Patient not taking: Reported on 11/30/2020 6/1/20   Fred Harvey MD   metoprolol succinate (TOPROL XL) 25 MG extended release tablet Take 1 tablet by mouth daily 5/30/20   Fred Harvey MD   QUEtiapine (SEROQUEL) 100 MG tablet Take 1 tablet by mouth nightly 4/6/20 Tacho Leon MD   furosemide (LASIX) 20 MG tablet Take 1 tablet by mouth daily 4/6/20   Tacho Leon MD   docusate (COLACE) 50 MG/5ML liquid 10 mLs by Per G Tube route 2 times daily 4/6/20   Tacho Leon MD   sucralfate (CARAFATE) 1 GM tablet Take 1 tablet by mouth 4 times daily 4/6/20   Tacho Leon MD   traZODone (DESYREL) 100 MG tablet Take 1 tablet by mouth nightly 4/6/20   Tacho Leon MD   RA VITAMIN D-3 50 MCG (2000 UT) CAPS take 1 capsule by mouth once daily 2/14/20   Historical Provider, MD   Oyster Shell 500 MG TABS Take 500 mg by mouth 2 times daily     Historical Provider, MD   Acetaminophen 500 MG CAPS Take 1 tablet by mouth as needed for Pain (follow OTC instructions)    Historical Provider, MD   calcium carbonate (TUMS) 500 MG chewable tablet Take 1 tablet by mouth 3 times daily as needed for Heartburn    Historical Provider, MD   gabapentin (NEURONTIN) 300 MG capsule Take 300 mg by mouth 3 times daily. Historical Provider, MD   simvastatin (ZOCOR) 40 MG tablet Take 40 mg by mouth nightly. Historical Provider, MD       REVIEW OF SYSTEMS:       CONSTITUTIONAL: negative for fatigue and malaise   EYES: negative for double vision and photophobia    HEENT: negative for tinnitus and sore throat   RESPIRATORY: negative for cough, shortness of breath   CARDIOVASCULAR: negative for chest pain, palpitations   GASTROINTESTINAL: negative for nausea, vomiting   GENITOURINARY: negative for incontinence   MUSCULOSKELETAL: negative for neck or back pain   NEUROLOGICAL: negative for seizures, weakness, numbness  Positive for aphasia, headaches   PSYCHIATRIC: negative for fatigue     PHYSICAL EXAM:       /64   Pulse 88   Temp 100.1 °F (37.8 °C) (Oral)   Resp 23   Ht 5' 3\" (1.6 m)   Wt 180 lb (81.6 kg)   SpO2 100%   BMI 31.89 kg/m²     CONSTITUTIONAL:  Well developed, well nourished, alert and oriented x 3, in no acute distress. GCS 15, nontoxic.  No dysarthria, no aphasia. HEAD:  normocephalic, atraumatic    EYES:  PERRLA, EOMI.   ENT:  moist mucous membranes   NECK:  supple, symmetric, no midline tenderness to palpation    BACK:  without midline tenderness, step-offs or deformities    LUNGS:  Equal air entry bilaterally   CARDIOVASCULAR:  normal s1 / s2   ABDOMEN:  Soft, no rigidity   NEUROLOGIC:  Mental Status:  A & O x3,awake             Cranial Nerves:    cranial nerves II-XII are grossly intact    Motor Exam:    Drift:  absent  Tone:  normal    Motor exam is symmetrical 5 out of 5 in bilateral upper extremities and 4/5 in bilateral lower extremitties    Sensory:    Touch:    Right Upper Extremity:  normal  Left Upper Extremity:  normal  Right Lower Extremity:  normal  Left Lower Extremity:  normal    Deep Tendon Reflexes:    Right Bicep:  2+  Left Bicep:  2+  Right Knee:  2+  Left Knee:  2+    Plantar Response:  Right:  downgoing  Left:  downgoing    Clonus:  absent  Cabrera's:  absent    Coordination/Dysmetria:  Heel to Shin:  Right:  normal  Left:  normal  Finger to Nose:   Right:  normal  Left:  normal     Gait:  Not Done    INITIAL NIH STROKE SCALE:    Time Performed:  7:30 PM     1a. Level of consciousness:  0 - alert; keenly responsive  1b. Level of consciousness questions:  0 - answers both questions correctly  1c. Level of consciousness questions:  0 - performs both tasks correctly  2. Best Gaze:  0 - normal  3. Visual:  0 - no visual loss  4. Facial Palsy:  0 - normal symmetric movement  5a. Motor left arm:  0 - no drift, limb holds 90 (or 45) degrees for full 10 seconds  5b. Motor right arm:  0 - no drift, limb holds 90 (or 45) degrees for full 10 seconds  6a. Motor left le - no drift; leg holds 30 degree position for full 5 seconds  6b. Motor right le - no drift; leg holds 30 degree position for full 5 seconds  7. Limb Ataxia:  0 - absent  8. Sensory:  0 - normal; no sensory loss  9.     Best Language:  0 - no aphasia, normal  10. Dysarthria:  0 - normal  11. Extinction and Inattention:  0 - no abnormality    TOTAL:  0     SKIN:  no rash      Modified Ismael Score Scale:     [] Zero: No symptoms at all   [] 1: No significant disability despite symptoms; able to carry out all usual duties and activities   [] 2: Slight disability; unable to carry out all previous activities, but able to look after own affairs without assistance   [] 3:Moderate disability; requiring some help, but able to walk without assistance   [x] 4: Moderately severe disability; unable to walk and attend to bodily needs without assistance   [] 5:Severe disability; bedridden, incontinent and requiring constant nursing care and attention    LABS AND IMAGING:     CBC with Differential:    Lab Results   Component Value Date    WBC 4.7 12/13/2020    RBC 4.45 12/13/2020    HGB 12.2 12/13/2020    HCT 39.2 12/13/2020     12/13/2020    MCV 88.1 12/13/2020    MCH 27.4 12/13/2020    MCHC 31.1 12/13/2020    RDW 16.0 12/13/2020    NRBC 1 06/02/2020    LYMPHOPCT 4 12/13/2020    MONOPCT 6 12/13/2020    BASOPCT 1 12/13/2020    MONOSABS 0.28 12/13/2020    LYMPHSABS 0.19 12/13/2020    EOSABS 0.00 12/13/2020    BASOSABS 0.05 12/13/2020    DIFFTYPE NOT REPORTED 12/13/2020     BMP:    Lab Results   Component Value Date     12/13/2020    K 3.2 12/13/2020    CL 95 12/13/2020    CO2 21 12/13/2020    BUN 13 12/13/2020    LABALBU 2.9 11/10/2020    CREATININE 1.23 12/13/2020    CALCIUM 8.6 12/13/2020    GFRAA 52 12/13/2020    LABGLOM 43 12/13/2020    GLUCOSE 148 12/13/2020     ASSESSMENT AND PLAN:       Assessment                 76 y.o. female who presents with transient event of expressive aphasia. Patient in ED was found with low grade fever, increased RR, increased lactic acid and pro calcitonin. No Leukocytosis. Still pending CXR result, U/A.    DDx:  1. TIA  2. Encephalopathy secondary to underlying infection/metabolic process  3.  Provoked seizure secondary to infection/Metabolic Process    1. Last Known Well (date and time): Around 1-3PM on 12/13/2020    2. Candidate for IV tPA therapy     Yes []     No  [x] due to the following exclusion criteria: NHSS 0     Contraindications for IV tPA:   ? Symptom onset is unknown, > 4.5 hours, or if patient awoke with stroke   ? Acute or previous intracranial hemorrhage   ? Imaging showing extensive regions of irreversible injury (hypoattenuation)   ? Prior ischemic stroke, severe head trauma, or intracranial/intraspinal surgery within 3 months   ? Symptoms of subarachnoid hemorrhage   ? GI malignancy or GI bleed within 21 days   ? Coagulopathy: (Platelets < 922, 362/QD³, INR > 1.7, aPTT > 40 s, PT > 15 s)   ? Treatment dose of low molecular weight heparin within 24 hours (does not apply to prophylactic doses to prevent VTE)   ? Use of anticoagulant drugs (thrombin inhibitors and factor Xa inhibitors) unless labs are normal or when patient has not taken for >48 hours with normal renal function   ? Use of antiplatelet that inhibits glycoprotein IIb/IIIa receptors (except in clinical trials)   ? Infective endocarditis due to increased risk of intracranial hemorrhage   ? Aortic arch dissection   ? Intra-axial (inside the brain tissue) intracranial neoplasm   ? Persistent elevated blood pressure (systolic > 939 mm Hg or diastolic > 720 mm Hg)   ? Active internal bleeding      3.  Candidate for Thrombectomy    Yes []      No [x] due to the following exclusion criteria: NIHSS 0    - Discussed with Dr. Adriane Gregg     Recommendations:    [] General Neurology Care Status - prefer 5th floor (5A/5C)   [x] Internal Medicine General Care Status   [] NICU Status - (5B)     [] MICU Status   [] Observation Status     - [facility] WO - STAT    - MRI Brain WO    - MRA H/N WO    - Limited TTE - Previous TTE on 2/24/2020 showed EF 68%, negative bubble    - EEG    - U/A, UDS    - CXR    - Will recommend GI evaluation to start ASA 81mg PO D vs Clopidogrel 74mg PO D. Patent with multiple admission for UGIB. Between the 2 Clopidogrel will not give GERD compared to ASA. - Consult IM for admission and treatment. - Neurology Consult for evaluation    - Continue Simvastatin 40mg PO nightly but could consider switching to Atorvastatin or Rosuvastatin    - Folic acid 1mg BID    - Fasting Lipid panel    - TSH    - HgbA1c lab    - PT, OT, Speech eval     - Hydrate    - Telemetry     - Neuro checks per protocol   - We recommend SBP < 160   - Blood glucose goal less than 180   - Please avoid dextrose containing solutions    Additional recommendations may follow    Please contact [facility] with any changes in patients neurologic status. Thank you for your consult.        Nanci Beebe MD  Adult General Neurology Resident PGY-4  12/13/2020 at 7:29 PM

## 2020-12-14 NOTE — CONSULTS
[facility]   [address] [facility]  Neurology Consult Note    Reason for Consult:  Aphasia  Requesting Physician:  Yazmin Villarreal DO     CHIEF COMPLAINT: Expressive aphasia, unable to speak words    History Obtained From:  patient, electronic medical record       HISTORY OF PRESENT ILLNESS:              The patient is a 76 y.o. female with significant past medical history of MVP, Migraines, GERD, UGIB, Hypotension (On Midodrine), 1 Kidney (born), Unknown Hx of atrial fibrillation who presents with transient event of expressive aphasia.                 Patient refer she lives at [facility] and today between 1:00PM-3:00PM she had a transient event of being unable to speak. Refer she was trying to tell something to one of the nurses and knew the word but they were not getting out. Refer it lasted less than 5 min. Patient refers this is the frst time this has happened. No weakness, numbness or any other focal deficits.                  Patient also mentioned that this morning she woke up with bifrontal headaches, that is characterize as pressure associated with nausea, no radiation and is 6/10 on intensity. Refer the frequency of headaches is a couple of them a week.      Denies fever, chills, vomiting, SOB, chest pain, abdominal pain, urinary disturbances. Refer she is 90% wheelchair bound.     Smoking: Quit 1 year ago but used to smoke 1PPD for 54 years. Alcohol: Denies  Illicit Drugs: Denies    Intermittent history:   Patient with history of multiple UTIs in upper GI bleeding. 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 There is some history of atrial fibrillation in the past but there is no other notes writing about it.     Past Medical History:        Diagnosis Date    Anxiety     Arthritis     Back pain     Bronchitis     Caffeine use     8 coffee / day    Carpal tunnel syndrome     Cataracts, bilateral     Constipation     Depression     Diarrhea     GERD (gastroesophageal reflux disease)     H/O gastric ulcer     Hyperlipidemia     Hypertension     Mumps     MVP (mitral valve prolapse)     Dr. Azeb Forde in May 2019    Recurrent UTI     Dr. Clem Perry    Sinusitis     Tracheostomy in place [facility])     Ulcerative esophagitis     Wellness examination     Dr. Whit De León seen in Jan 2020     Past Surgical History:        Procedure Laterality Date    APPENDECTOMY     [address], LAPAROSCOPIC  05/22/2020     LAPAROSCOPIC ROBOTIC CHOLECYSTECTOMY, PYLOROPLASTY     CHOLECYSTECTOMY, LAPAROSCOPIC N/A 5/22/2020    XI LAPAROSCOPIC ROBOTIC CHOLECYSTECTOMY, PYLOROPLASTY performed by Janneth Song MD at [address]      EGD  3/17/2020         ERCP  05/21/2020    with stent insertion, balloon dilation, sphinctereotomy    ERCP  5/21/2020    ** CASE IN OR WITY GI STAFF** ERCP STENT INSERTION performed by Isa Harrington MD at [facility]    ERCP  5/21/2020    ** CASE IN OR WITH GI STAFF**ERCP SPHINCTER/PAPILLOTOMY performed by Isa Harrington MD at [facility]    ERCP  5/21/2020    ** CASE IN OR WITH GI STAFF**ERCP DILATION BALLOON performed by Isa Harrington MD at [address]      patricia IOL    GASTRIC FUNDOPLICATION N/A 5/65/1353    XI LAPAROSCOPIC ROBOTIC HIATAL HERNIA REPAIR, CHERYL FUNDOPLICATION [facility] N/A 3/27/2020    EGD PEG TUBE PLACEMENT performed by Janneth Song MD at [address] [facility]. CATH POWER PICC TRIPLE  3/4/2020         HYSTERECTOMY      Abdominal    JOINT REPLACEMENT Right     right hip    OVARY REMOVAL      RHINOPLASTY      TONSILLECTOMY      TOTAL HIP ARTHROPLASTY      TOTAL NEPHRECTOMY      atrophic from infections, age 13    TRACHEOSTOMY N/A 3/27/2020 **ADD ON **WANTS 10:00AM **TRACHEOTOMY performed by Tim Carpio MD at [address] [facility] 4/2/2020    TRACHEOTOMY EXCHANGE performed by Tim Carpio MD at [address] [facility] 3/17/2020    BEDSIDE EGD ESOPHAGOGASTRODUODENOSCOPY (ICU) performed by Tim Carpio MD at [address] [place] [address] 5/18/2020    EGD BIOPSY performed by Elin Moody MD at [address] [place] [address]  5/18/2020    EGD DILATION BALLOON performed by Elin Moody MD at [address] [place] [address] N/A 7/6/2020    ** CASE IN O.R. WITH GI STAFF** EGD ESOPHAGOGASTRODUODENOSCOPY performed by Elizabeth Campos MD at [address] [place] [address] N/A 11/9/2020    EGD DIAGNOSTIC ONLY performed by Barry Aleman MD at [facility]     Current Medications:   Current Facility-Administered Medications: magnesium sulfate 2 g in 50 mL IVPB premix, 2 g, Intravenous, Once  0.9 % sodium chloride bolus, 1,000 mL, Intravenous, Once  potassium chloride (KLOR-CON M) extended release tablet 40 mEq, 40 mEq, Oral, Once  ketorolac (TORADOL) injection 15 mg, 15 mg, Intravenous, Once **AND** metoclopramide (REGLAN) injection 5 mg, 5 mg, Intravenous, Once  Allergies:  Prednisone, Compazine [prochlorperazine maleate], Penicillin v potassium, and Penicillins    Social History:  TOBACCO:   reports that she has quit smoking. Her smoking use included cigarettes. She has a 50.00 pack-year smoking history. She has never used smokeless tobacco.  ETOH:   reports no history of alcohol use. DRUGS:   reports no history of drug use.   ACTIVITIES OF DAILY LIVING: Patient is wheelchair-bound and needs help  INSTRUMENTAL ACTIVITIES OF DAILY LIVING: Patient is wheelchair-bound and needs help    Family History:       Problem Relation Age of Onset    Cancer Mother         uterine    Heart Attack Mother     Heart Attack Father     Other Maternal Grandfather         foot gangrene    Other Paternal Grandmother         bleeding ulcers    Other Paternal Grandfather         lung cancer       REVIEW OF SYSTEMS:  CONSTITUTIONAL: negative for fatigue and malaise   EYES: negative for double vision and photophobia    HEENT: negative for tinnitus and sore throat   RESPIRATORY: negative for cough, shortness of breath   CARDIOVASCULAR: negative for chest pain, palpitations, or syncope   GASTROINTESTINAL: negative for abdominal pain, nausea, vomiting, diarrhea, or constipation    GENITOURINARY: negative for incontinence or retention    MUSCULOSKELETAL: negative for neck or back pain, negative for extremity pain   NEUROLOGICAL: Negative for seizures, headaches, weakness, numbness, confusion, dysarthria  Positive for aphasia   PSYCHIATRIC: negative for agitation, hallucination, SI/HI   SKIN Negative for spontaneous contusions, rashes, or lesions      PHYSICAL EXAM:    Vitals:  /64   Pulse 81   Temp 100.1 °F (37.8 °C) (Oral)   Resp 14   Ht 5' 3\" (1.6 m)   Wt 180 lb (81.6 kg)   SpO2 91%   BMI 31.89 kg/m²      General Appearance:  Appropiate    MENTAL STATUS:  Alert, Oriented x 3 (Person, Place, Time),    Good Mood, Intact memory (Recent, Remote), No confusion,    Normal speech, Normal language (Spontaneous, Comprehension, Naming, Repetition, Reading, Writing)   No hallucination or delusion   Appropriate, Follows 1, 2, 3 step command, cross over   CRANIAL NERVES: II     -      Visual fields intact to confrontation (blink to threat) and reporting fingers in all 4 quadrants on each eye  Fundoscopic Exam: Not tested  Pupillary Reflex (2 & 3):  PERR (R 3mm, L 3mm) to direct and consensual response bilaterally    III,IV,VI -  EOMs full intact, no ptosis, no diplopia, no nystagmus    V     -     Normal facial sensation bilaterally    VII    -     Normal facial symmetry    VIII   -     Intact hearing bilaterally    IX,X - Symmetrical palate    XI    -     Symmetrical shoulder shrug    XII   -     Midline tongue, no atrophy   MOTOR FUNCTION: Observation: No fasciculations, no atrophy, No tremors/involuntary movements in all 4 extremities proximally and distally    Passive Movement: Normal tone and bulk in all 4 extremities proximally and distally    Active Movement:  RUE: Significant for good strength of grade 5/5 in proximal muscle groups and distal muscle groups    LUE: Significant for good strength of grade 5/5 in proximal muscle groups and distal muscle groups      RLE: Significant for good strength of grade 4/5 in proximal muscle groups and distal muscle groups    LLE: Significant for good strength of grade 4/5 in proximal muscle groups and distal muscle groups    Drift: None   SENSORY FUNCTION:  RUE: Normal sensation to light touch, temperature, pin prick     LUE: Normal sensation to light touch, temperature, pin prick     RLE: Normal sensation to light touch, temperature, pin prick     LLE:Normal sensation to light touch, temperature, pin prick   CEREBELLAR FUNCTION:  Intact fine motor control over bilateral upper extremities   REFLEX FUNCTION:  Right Triceps (C7): 2/2                                             Left Tricep (C7): 2/2   Right Bicep (C5, C6): 2/2                                         Left Bicep (C5, C6): 2/2   Right Brachiocephalic (C6): 2/2                               Left Brachiocephalic (C6): 2/2     Right Patella (L4): 2/2                                              Left Patella (L4): 2/2   Right Achilles (S1): 2/2                                            Left Achilles (S1): 2/2      Cabrera Sign: None   Plantar (Babinski) Response: None     Negative Kerning & Brudzinski   STATION and GAIT  Not done              Additional Examination Elements and Findings:     LUNGS:  Decreased breath sound bilaterally  CARDIOVASCULAR:  RRR, S1, S2, MVP    DATA  Recent Results (from the past 24 hour(s))   STROKE PANEL Collection Time: 12/13/20  7:44 PM   Result Value Ref Range    Glucose 148 (H) 70 - 99 mg/dL    BUN 13 8 - 23 mg/dL    CREATININE 1.23 (H) 0.50 - 0.90 mg/dL    Bun/Cre Ratio NOT REPORTED 9 - 20    Calcium 8.6 8.6 - 10.4 mg/dL    Sodium 131 (L) 135 - 144 mmol/L    Potassium 3.2 (L) 3.7 - 5.3 mmol/L    Chloride 95 (L) 98 - 107 mmol/L    CO2 21 20 - 31 mmol/L    Anion Gap 15 9 - 17 mmol/L    GFR Non-African American 43 (L) >60 mL/min    GFR  52 (L) >60 mL/min    GFR Comment          GFR Staging NOT REPORTED     WBC 4.7 3.5 - 11.3 k/uL    RBC 4.45 3.95 - 5.11 m/uL    Hemoglobin 12.2 11.9 - 15.1 g/dL    Hematocrit 39.2 36.3 - 47.1 %    MCV 88.1 82.6 - 102.9 fL    MCH 27.4 25.2 - 33.5 pg    MCHC 31.1 28.4 - 34.8 g/dL    RDW 16.0 (H) 11.8 - 14.4 %    Platelets 617 605 - 756 k/uL    MPV 11.2 8.1 - 13.5 fL    NRBC Automated 0.0 0.0 per 100 WBC    Total CK 82 26 - 192 U/L    CK-MB 1.1 <5.4 ng/mL    % CKMB 1.3 0.0 - 3.0 %    CKMB Interpretation NORMAL ISOENZYME PATTERN     Differential Type NOT REPORTED     WBC Morphology NOT REPORTED     RBC Morphology NOT REPORTED     Platelet Estimate NOT REPORTED     Immature Granulocytes 4 (H) 0 %    Seg Neutrophils 85 (H) 36 - 66 %    Lymphocytes 4 (L) 24 - 44 %    Monocytes 6 1 - 7 %    Eosinophils % 0 (L) 1 - 4 %    Basophils 1 0 - 2 %    Absolute Immature Granulocyte 0.19 0.00 - 0.30 k/uL    Segs Absolute 3.99 1.8 - 7.7 k/uL    Absolute Lymph # 0.19 (L) 1.0 - 4.8 k/uL    Absolute Mono # 0.28 0.1 - 0.8 k/uL    Absolute Eos # 0.00 0.0 - 0.4 k/uL    Basophils Absolute 0.05 0.0 - 0.2 k/uL    Morphology ANISOCYTOSIS PRESENT     Myoglobin 74 (H) 25 - 58 ng/mL    Protime 10.8 9.0 - 12.0 sec    INR 1.0     PTT 24.8 20.5 - 30.5 sec    Troponin, High Sensitivity 47 (H) 0 - 14 ng/L    Troponin T NOT REPORTED <0.03 ng/mL    Troponin Interp NOT REPORTED    Lactic Acid, Whole Blood    Collection Time: 12/13/20  7:44 PM   Result Value Ref Range    Lactic Acid, Whole Blood 3.0 (H) 0.7 - 2.1 mmol/L   Procalcitonin    Collection Time: 12/13/20  7:44 PM   Result Value Ref Range    Procalcitonin 0.32 (H) <0.09 ng/mL   Prolactin    Collection Time: 12/13/20  7:44 PM   Result Value Ref Range    Prolactin 61.09 (H) 4.79 - 23.30 ug/L   Urinalysis Reflex to Culture    Collection Time: 12/13/20  8:39 PM    Specimen: Urine, clean catch   Result Value Ref Range    Color, UA YELLOW YELLOW    Turbidity UA CLEAR CLEAR    Glucose, Ur NEGATIVE NEGATIVE    Bilirubin Urine NEGATIVE NEGATIVE    Ketones, Urine NEGATIVE NEGATIVE    Specific Gravity, UA 1.014 1.005 - 1.030    Urine Hgb MODERATE (A) NEGATIVE    pH, UA 6.0 5.0 - 8.0    Protein, UA TRACE (A) NEGATIVE    Urobilinogen, Urine Normal Normal    Nitrite, Urine NEGATIVE NEGATIVE    Leukocyte Esterase, Urine SMALL (A) NEGATIVE    Urinalysis Comments NOT REPORTED    Microscopic Urinalysis    Collection Time: 12/13/20  8:39 PM   Result Value Ref Range    -          WBC, UA 20 TO 50 0 - 5 /HPF    RBC, UA 2 TO 5 0 - 4 /HPF    Casts UA NOT REPORTED 0 - 8 /LPF    Crystals, UA NOT REPORTED None /HPF    Epithelial Cells UA None 0 - 5 /HPF    Renal Epithelial, UA NOT REPORTED 0 /HPF    Bacteria, UA MANY (A) None    Mucus, UA NOT REPORTED None    Trichomonas, UA NOT REPORTED None    Amorphous, UA NOT REPORTED None    Other Observations UA NOT REPORTED NOT REQ. Yeast, UA NOT REPORTED None     IMPRESSION     Case of a 69-year-old female patient evaluated for transient event of expressive aphasia    //Encephalopathy secondary to infection/metabolic process//   Patient with transient event of expressive aphasia, unable to express herself and found to have a low-grade fever, UTI, increased lactic acid, increased procalcitonin, mild hyponatremia, hypokalemia. Will recommend to have internal medicine admit patient in manage infection and metabolites.   Patient event could be a transient episode secondary to this infection and metabolic process.      //TIA//   Patient had an event of transient aphasia that could be secondary to metabolic process and infection  But also patient has risk factors to have a transient ischemic attack. Will still get head CT, brain MRI without contrast, MRA head and neck without contrast since patient has only one kidney I will not like to damage the remaining one. Will consider to start patient on Plavix or clopidogrel for this but will need GI evaluation since has multiple upper GI bleeding events. Clopidogrel has less GERD symptoms that aspirin. If found that the patient has more of metabolic process will see if any antiplatelet does not have to be started.    //Seizure secondary to infection/metabolic process //   Patient transient aphasia most likely related to metabolic process can also be a manifestation of a seizure-like event provoked by this metabolic/infectious process. Will like to get EEG to evaluate that there is no other abnormality in the brain waves. No AED at the moment    RECOMMENDATIONS:   1. IM as primary for medical management of low grade fever, Increased lactic acid and procalcitonin, mild hyponatrremia, increased troponin, UTI. There is a obscure history of atrial fibrillation that in some notes that shows and others did not mention it I will need evaluation for it. 2. Will recommend GI evaluation in case there is need for any antiplatelet medication. 3. Head CT WO  4. Brain MRI WO, MRA H/N WO due to only 1 kidney  5. EEG  6. UA, UDS  7. Hemoglobin A1c, lipid panel, TSH, lactic acid, procalcitonin, prolactin  8. Hydration  9. Headache cocktail    Thank you for the consultation. Will follow.      Holly Blake MD [facility]  Adult General Neurology Resident PGY-4  12/13/2020 at 12:13AM

## 2020-12-14 NOTE — ED NOTES
Neurology at bedside     [facility]  12/13/20 [id number]

## 2020-12-14 NOTE — ED PROVIDER NOTES
[facility] ED     Emergency Department     Faculty Attestation        I performed a history and physical examination of the patient and discussed management with the resident. I reviewed the residents note and agree with the documented findings and plan of care. Any areas of disagreement are noted on the chart. I was personally present for the key portions of any procedures. I have documented in the chart those procedures where I was not present during the key portions. I have reviewed the emergency nurses triage note. I agree with the chief complaint, past medical history, past surgical history, allergies, medications, social and family history as documented unless otherwise noted below. For mid-level providers such as nurse practitioners as well as physicians assistants:    I have personally seen and evaluated the patient. I find the patient's history and physical exam are consistent with NP/PA documentation. I agree with the care provided, treatment rendered, disposition, & follow-up plan. Additional findings are as noted. Vital Signs: /64   Pulse 88   Temp 100.1 °F (37.8 °C) (Oral)   Resp 23   Ht 5' 3\" (1.6 m)   Wt 180 lb (81.6 kg)   SpO2 100%   BMI 31.89 kg/m²   PCP:  Solo Dietrich MD    Pertinent Comments:     Patient presents via EMS from a nursing home with concern for stroke. She had a episode were she was aphasic. This was earlier today around 1. She has no focal deficits now. In her chart it says she has a history of atrial fibrillation. She is on no anticoagulation. Her NIH is 0.   Will obtain CT imaging labs, consultation with stroke team, reassessment      Critical Care  None          Ivanna Caballero MD  Attending Emergency Medicine Physician              Kenan Lara MD  12/13/20 Marlo Roman

## 2020-12-14 NOTE — ED NOTES
Pt arrived back from [place],  Dr. Sadie Odonnell, infectious disease @ bedside,  Pt reattached to pure wick & brief changed, call light within reach, will cont to monitor     Connie Robertson RN  12/14/20 0054

## 2020-12-14 NOTE — PROGRESS NOTES
Occupational Therapy   Occupational Therapy Initial Assessment  Date: 2020   Patient Name: Aroldo Jim  MRN: [id number]     : 1945    Date of Service: 2020    Discharge Recommendations:  Patient would benefit from continued therapy after discharge  OT Equipment Recommendations  Equipment Needed: (CTA)  Copied from Emergency Medicine:  Aroldo Jim is a 76 y.o. female who presents by EMS from a nursing facility. Patient states that earlier today at an unknown time she had what she describes as the fact that she was trying to see specific words however the words that came out were not the words that she was trying to say. He denies any slurring of her speech or garbled speech. This appears to be schizoaphasia. Patient reports mild headache denies any trauma unknown last known well. Patient has a history of A. fib not on anticoagulation. Patient denies any focal weakness, numbness or paresthesias. She is asymptomatic currently  Assessment    Pt lying supine in bed upon entrance to room. Pt completed bed mobility with min assistance. Pt sat at eob 6 minutes with fair unsupported sitting balance with forward flexed posture. Sit to stand with mod assistance. Pt with increased anxiety that requires increased time for processing and completion and emotional support/encouragement. Please refer to below assist levels for adl completion. Pts linen were damp, informed RN and requested clean linen, RN reports will change linen after giving pts medication. Pt ed on OT POC, safety awareness tech, proper hand placement for transfers, with proper breathing tech with fair return. Pt retired supine in bed with call light and phone in reach. All needs met upon exit. Performance deficits / Impairments: Decreased functional mobility ; Decreased safe awareness;Decreased balance;Decreased ADL status; Decreased posture;Decreased endurance;Decreased high-level IADLs  Treatment Diagnosis: Sepsis  Prognosis: Sepsis      Restrictions  Restrictions/Precautions  Restrictions/Precautions: Fall Risk, Up as Tolerated  Required Braces or Orthoses?: No    Subjective   General  Patient assessed for rehabilitation services?: Yes  Family / Caregiver Present: No  Patient Currently in Pain: Yes  Pain Assessment  Pain Assessment: 0-10  Pain Level: 8  Pain Type: Acute pain  Pain Location: Abdomen;Knee  Pain Orientation: Right;Left  Non-Pharmaceutical Pain Intervention(s): Repositioned;Distraction; Ambulation/Increased Activity; Therapeutic presence  Vital Signs  Patient Currently in Pain: Yes  Oxygen Therapy  O2 Device: Nasal cannula  O2 Flow Rate (L/min): 2 L/min  Patient Observation  Observations: pt reports no use oh 02 at SNF  Social/Functional History  Social/Functional History  Lives With: Other (comment)  Type of Home: Facility(Pt is a long term resident at [facility]. Pt has been ta resident since the spring of 2019)  Home Layout: One level  Home Access: Level entry  Bathroom Shower/Tub: Walk-in shower, Curtain  Bathroom Toilet: Handicap height  Bathroom Equipment: Grab bars in shower, Tub transfer bench, Hand-held shower  Home Equipment: Rolling walker, [address] Help From: Personal care attendant  ADL Assistance: Needs assistance  Homemaking Assistance: Needs assistance  Homemaking Responsibilities: No  Ambulation Assistance: Needs assistance  Transfer Assistance: Needs assistance  Active : No  Patient's  Info: Pt reports son drives; [place] service at facility for dr underwood but limited since [facility]  Mode of Transportation: [employer]  Occupation: Retired  Leisure & Hobbies: Play games on [employer], [employer] talk to roommate, watch tv  Additional Comments: Pt has 2 supportive sons, one local and one in [facility].  Pt receives PT 3 x week and restorative therapy 5 x week     Objective   Vision: Impaired  Vision Exceptions: Wears glasses at all times(glasses left at [facility])  Hearing: Within functional limits Orientation  Overall Orientation Status: Within Functional Limits     Balance  Sitting Balance: Contact guard assistance  Standing Balance: Minimal assistance  Functional Mobility  Functional Mobility Comments: Functional mobility not completed due to pts increased anxiety and pt receiving a phone call from son, that pt wanted to answer  ADL  Feeding: Independent;Setup  Grooming: Independent;Setup  UE Bathing: Minimal assistance; Increased time to complete;Setup(assist for back)  LE Bathing: Moderate assistance; Increased time to complete;Setup  UE Dressing: Minimal assistance;Setup(to tie gown in back)  LE Dressing: Moderate assistance; Increased time to complete;Setup  Toileting: (purewick)  Tone RUE  RUE Tone: Normotonic  Tone LUE  LUE Tone: Normotonic  Coordination  Movements Are Fluid And Coordinated: Yes     Bed mobility  Rolling to Right: Contact guard assistance  Supine to Sit: Minimal assistance  Sit to Supine: Contact guard assistance  Scootin Person assistance;Maximal assistance(to scoot up in stretcher)  Transfers  Sit to stand: Moderate assistance  Stand to sit: Minimal assistance     Cognition  Overall Cognitive Status: Exceptions  Following Commands: Follows multistep commands with increased time; Follows multistep commands with repitition  Insights: Decreased awareness of deficits  Initiation: Requires cues for some  Sequencing: Requires cues for some  Cognition Comment: pt with increased anxiety.  Pt requires increased time to process requests and moderate emotional support provided     Sensation  Overall Sensation Status: WFL(denies numbness/tingling B hands)      LUE PROM (degrees)  LUE PROM: WFL  LUE AROM (degrees)  LUE AROM : WFL  Left Hand PROM (degrees)  Left Hand PROM: WFL  Left Hand AROM (degrees)  Left Hand AROM: WFL  RUE PROM (degrees)  RUE PROM: WFL  RUE AROM (degrees)  RUE AROM : WFL  Right Hand PROM (degrees)  Right Hand PROM: WFL  Right Hand AROM (degrees)  Right Hand AROM: WFL  LUE Strength  Gross LUE Strength: WFL  L Hand General: 4-/5  LUE Strength Comment: 4-/5 overall uscle strength  RUE Strength  Gross RUE Strength: WFL  R Hand General: 4-/5  RUE Strength Comment: 4-/5 overall muscle strength      Plan   Plan  Times per week: 3-4 x week    AM-PAC Score  AM-PAC Inpatient Daily Activity Raw Score: 18 (12/14/20 1205)  AM-PAC Inpatient ADL T-Scale Score : 38.66 (12/14/20 1205)  ADL Inpatient CMS 0-100% Score: 46.65 (12/14/20 1205)  ADL Inpatient CMS G-Code Modifier : CK (12/14/20 1205)    Goals  Short term goals  Time Frame for Short term goals: Pt will, by discharge:  Short term goal 1: Dem min a for adl performance  Short term goal 2: Dem I with bed mobility  Short term goal 3: Dem 5 min static standing for hygiene purposes with sba  Short term goal 4: Dem good safety awareness tech for all transfers/mobility  Short term goal 5: Dem cga for functional transfers       Therapy Time   Individual Concurrent Group Co-treatment   Time In 1026         Time Out 1054         Minutes 28         Timed Code Treatment Minutes: [facility] [place] [facility], OTR/L

## 2020-12-14 NOTE — ED NOTES
Brief change, isabel care  Pt requesting night medication   Writer too look at [place] orders     Kassandra Mcclain RN  12/14/20 [id number]

## 2020-12-14 NOTE — ED NOTES
Lactic labeled and sent  Pt provided blanket and await trazodone from pharmacy  Pt denies needs at this time     Shepard Goldmann, RN  12/14/20 0401 E Division Guerra RN  12/14/20 9703

## 2020-12-14 NOTE — ED PROVIDER NOTES
[facility] ED  Emergency Department Encounter  EmergencyMedicine Resident     Pt Name:Erin Sidhu  MRN: [id number]  [place] 1945  Date of evaluation: 12/13/20  PCP:  Bijan Hylton MD    [address] [place] [address]       Chief Complaint   Patient presents with    Aphasia       HISTORY OF PRESENT ILLNESS  (Location/Symptom, Timing/Onset, Context/Setting, Quality, Duration, Modifying Factors, Severity.)      Radha Martin is a 76 y.o. female who presents by EMS from a nursing facility. Patient states that earlier today at an unknown time she had what she describes as the fact that she was trying to see specific words however the words that came out were not the words that she was trying to say. He denies any slurring of her speech or garbled speech. This appears to be schizoaphasia. Patient reports mild headache denies any trauma unknown last known well. Patient has a history of A. fib not on anticoagulation. Patient denies any focal weakness, numbness or paresthesias. She is asymptomatic currently. PAST MEDICAL / SURGICAL / SOCIAL / FAMILY HISTORY      has a past medical history of Anxiety, Arthritis, Back pain, Bronchitis, Caffeine use, Carpal tunnel syndrome, Cataracts, bilateral, Constipation, Depression, Diarrhea, GERD (gastroesophageal reflux disease), H/O gastric ulcer, Hyperlipidemia, Hypertension, Mumps, MVP (mitral valve prolapse), Recurrent UTI, Sinusitis, Tracheostomy in place [facility]), Ulcerative esophagitis, and Wellness examination. has a past surgical history that includes Hysterectomy; total nephrectomy; Tonsillectomy; Appendectomy; Cystoscopy; Ovary removal; Tubal ligation; rhinoplasty; Carpal tunnel release; Hand surgery; Total hip arthroplasty; eye surgery; Colonoscopy; joint replacement (Right); Gastric fundoplication (N/A, 0/59/4545); hc cath power picc triple (3/4/2020); EGD (3/17/2020);  Upper gastrointestinal endoscopy (N/A, 3/17/2020); tracheostomy (N/A, 3/27/2020); Gastrostomy tube placement (N/A, 3/27/2020); tracheostomy (N/A, 4/2/2020); Upper gastrointestinal endoscopy (N/A, 5/18/2020); Upper gastrointestinal endoscopy (5/18/2020); ERCP (05/21/2020); Cholecystectomy, laparoscopic (05/22/2020); ERCP (5/21/2020); ERCP (5/21/2020); ERCP (5/21/2020); Cholecystectomy, laparoscopic (N/A, 5/22/2020); Upper gastrointestinal endoscopy (N/A, 7/6/2020); and Upper gastrointestinal endoscopy (N/A, 11/9/2020). Social History     Socioeconomic History    Marital status:       Spouse name: Not on file    Number of children: 2    Years of education: Not on file    Highest education level: Not on file   Occupational History    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   Social Needs    Financial resource strain: Not on file    Food insecurity     Worry: Not on file     Inability: Not on file   [employer] needs     Medical: Not on file     Non-medical: Not on file   Tobacco Use    Smoking status: Former Smoker     Packs/day: 1.00     Years: 50.00     Pack years: 50.00     Types: Cigarettes    Smokeless tobacco: Never Used    Tobacco comment: quit 1 year ago    Substance and Sexual Activity    Alcohol use: No    Drug use: No    Sexual activity: Never   Lifestyle    Physical activity     Days per week: Not on file     Minutes per session: Not on file    Stress: Not on file   Relationships    Social connections     Talks on phone: Not on file     Gets together: Not on file     Attends Nondenominational service: Not on file     Active member of club or organization: Not on file     Attends meetings of clubs or organizations: Not on file     Relationship status: Not on file    Intimate partner violence     Fear of current or ex partner: Not on file     Emotionally abused: Not on file     Physically abused: Not on file     Forced sexual activity: Not on file   Other Topics Concern    Not on file   Social History Narrative    Not on file       Family History   Problem Relation Age of Onset    Cancer Mother         uterine    Heart Attack Mother     Heart Attack Father     Other Maternal Grandfather         foot gangrene    Other Paternal Grandmother         bleeding ulcers    Other Paternal Grandfather         lung cancer       Allergies:  Prednisone, Compazine [prochlorperazine maleate], Penicillin v potassium, and Penicillins    Home Medications:  Prior to Admission medications    Medication Sig Start Date End Date Taking? Authorizing Provider   metoclopramide (REGLAN) 5 MG tablet Take 5 mg by mouth 4 times daily    Historical Provider, MD   benzocaine (ORAJEL) 10 % mucosal gel Take by mouth as needed. Patient not taking: Reported on 11/30/2020 11/10/20   Glenna Hook DO   clonazePAM (KLONOPIN) 0.5 MG tablet Take 0.5 tablets by mouth 2 times daily for 60 days.  Taking 1/2 tablet BID 7/7/20 11/7/20  Melvi Sctot MD   pantoprazole (PROTONIX) 40 MG tablet Take 1 tablet by mouth 2 times daily 7/7/20   Melvi Scott MD   ferrous sulfate (IRON 325) 325 (65 Fe) MG tablet Take 1 tablet by mouth 2 times daily 7/7/20   Melvi Scott MD   amitriptyline (ELAVIL) 25 MG tablet Take 1 tablet by mouth nightly 7/14/20   Melvi Scott MD   busPIRone (BUSPAR) 10 MG tablet Take 2 tablets by mouth 3 times daily 7/14/20   Melvi Scott MD   escitalopram (LEXAPRO) 20 MG tablet Take 1 tablet by mouth daily 7/14/20   Melvi Scott MD   midodrine (PROAMATINE) 5 MG tablet Take 1 tablet by mouth 3 times daily as needed (if SBP < 100)  Patient not taking: Reported on 11/30/2020 6/1/20   Edison Griffith MD   metoprolol succinate (TOPROL XL) 25 MG extended release tablet Take 1 tablet by mouth daily 5/30/20   Edison Griffith MD   QUEtiapine (SEROQUEL) 100 MG tablet Take 1 tablet by mouth nightly 4/6/20   Tacho Leon MD   furosemide (LASIX) 20 MG tablet Take 1 tablet by mouth daily 4/6/20   Tacho Leon MD   docusate (COLACE) 50 MG/5ML liquid 10 mLs by Per G Tube route 2 times daily 4/6/20 Rubye Kawasaki, MD   sucralfate (CARAFATE) 1 GM tablet Take 1 tablet by mouth 4 times daily 4/6/20   Rubye Kawasaki, MD   traZODone (DESYREL) 100 MG tablet Take 1 tablet by mouth nightly 4/6/20   Rubye Kawasaki, MD RA VITAMIN D-3 50 MCG (2000 UT) CAPS take 1 capsule by mouth once daily 2/14/20   Historical Provider, MD   Oyster Shell 500 MG TABS Take 500 mg by mouth 2 times daily     Historical Provider, MD   Acetaminophen 500 MG CAPS Take 1 tablet by mouth as needed for Pain (follow OTC instructions)    Historical Provider, MD   calcium carbonate (TUMS) 500 MG chewable tablet Take 1 tablet by mouth 3 times daily as needed for Heartburn    Historical Provider, MD   gabapentin (NEURONTIN) 300 MG capsule Take 300 mg by mouth 3 times daily. Historical Provider, MD   simvastatin (ZOCOR) 40 MG tablet Take 40 mg by mouth nightly. Historical Provider, MD       REVIEW OF SYSTEMS    (2-9 systems for level 4, 10 or more for level 5)      Review of Systems   Constitutional: Positive for fatigue. HENT: Negative for congestion and rhinorrhea. Eyes: Negative for photophobia. Respiratory: Negative for shortness of breath. Cardiovascular: Negative for chest pain. Gastrointestinal: Positive for abdominal pain. Negative for nausea and vomiting. Suprapubic abdominal pain. No dysuria or difficulty urinating   Genitourinary: Negative for dysuria and urgency. Musculoskeletal: Positive for gait problem. Uses wheelchair at baseline   Skin: Negative for rash. Hematological: Negative for adenopathy. Psychiatric/Behavioral: Negative for agitation. PHYSICAL EXAM   (up to 7 for level 4, 8 or more for level 5)      INITIAL VITALS:   /64   Pulse 88   Temp 100.1 °F (37.8 °C) (Oral)   Resp 23   Ht 5' 3\" (1.6 m)   Wt 180 lb (81.6 kg)   SpO2 100%   BMI 31.89 kg/m²     Physical Exam  Vitals signs and nursing note reviewed. HENT:      Head: Normocephalic.       Right Ear: External ear normal.      Left Ear: External ear normal.      Nose: Nose normal.      Mouth/Throat:      Mouth: Mucous membranes are moist.   Eyes:      General: No visual field deficit. Extraocular Movements: Extraocular movements intact. Conjunctiva/sclera: Conjunctivae normal.   Cardiovascular:      Rate and Rhythm: Normal rate. Pulses: Normal pulses. Pulmonary:      Breath sounds: Normal breath sounds. Abdominal:      Palpations: Abdomen is soft. Tenderness: There is abdominal tenderness. Comments: Suprapubic area. PEG tube observed patient states she does not use it and was placed a while back when she was acutely ill and needs to be removed. Musculoskeletal: Normal range of motion. General: No tenderness. Skin:     General: Skin is warm. Capillary Refill: Capillary refill takes less than 2 seconds. Neurological:      Mental Status: She is alert and oriented to person, place, and time. Cranial Nerves: Cranial nerves are intact. No cranial nerve deficit, dysarthria or facial asymmetry. Sensory: Sensation is intact. No sensory deficit. Motor: Tremor present. No abnormal muscle tone or pronator drift.       Comments: Plantarflexion and dorsiflexion bilaterally is 3/5   Psychiatric:         Mood and Affect: Mood normal.       DIFFERENTIAL  DIAGNOSIS     PLAN (LABS / Carroll Faby / EKG):  Orders Placed This Encounter   Procedures    Culture, Urine    Culture, Blood 1    Culture, Blood 2    XR CHEST PORTABLE    CT HEAD WO CONTRAST    STROKE PANEL    Urinalysis Reflex to Culture    Hemoglobin A1C    Lactic Acid, Whole Blood    Procalcitonin    Prolactin    Urine Drug Screen    Microscopic Urinalysis    Troponin    LIPID PANEL    COMPREHENSIVE METABOLIC PANEL    MAGNESIUM    PHOSPHORUS    Troponin    COVID-19    Protime-INR    Lactate, Sepsis    APTT    Troponin    Straight cath   [facility] Inpatient consult to Neurology   [facility] Inpatient Platelet Estimate NOT REPORTED     Immature Granulocytes 4 (H) 0 %    Seg Neutrophils 85 (H) 36 - 66 %    Lymphocytes 4 (L) 24 - 44 %    Monocytes 6 1 - 7 %    Eosinophils % 0 (L) 1 - 4 %    Basophils 1 0 - 2 %    Absolute Immature Granulocyte 0.19 0.00 - 0.30 k/uL    Segs Absolute 3.99 1.8 - 7.7 k/uL    Absolute Lymph # 0.19 (L) 1.0 - 4.8 k/uL    Absolute Mono # 0.28 0.1 - 0.8 k/uL    Absolute Eos # 0.00 0.0 - 0.4 k/uL    Basophils Absolute 0.05 0.0 - 0.2 k/uL    Morphology ANISOCYTOSIS PRESENT     Myoglobin 74 (H) 25 - 58 ng/mL    Protime 10.8 9.0 - 12.0 sec    INR 1.0     PTT 24.8 20.5 - 30.5 sec    Troponin, High Sensitivity 47 (H) 0 - 14 ng/L    Troponin T NOT REPORTED <0.03 ng/mL    Troponin Interp NOT REPORTED    Urinalysis Reflex to Culture    Specimen: Urine, clean catch   Result Value Ref Range    Color, UA YELLOW YELLOW    Turbidity UA CLEAR CLEAR    Glucose, Ur NEGATIVE NEGATIVE    Bilirubin Urine NEGATIVE NEGATIVE    Ketones, Urine NEGATIVE NEGATIVE    Specific Gravity, UA 1.014 1.005 - 1.030    Urine Hgb MODERATE (A) NEGATIVE    pH, UA 6.0 5.0 - 8.0    Protein, UA TRACE (A) NEGATIVE    Urobilinogen, Urine Normal Normal    Nitrite, Urine NEGATIVE NEGATIVE    Leukocyte Esterase, Urine SMALL (A) NEGATIVE    Urinalysis Comments NOT REPORTED    Lactic Acid, Whole Blood   Result Value Ref Range    Lactic Acid, Whole Blood 3.0 (H) 0.7 - 2.1 mmol/L   Procalcitonin   Result Value Ref Range    Procalcitonin 0.32 (H) <0.09 ng/mL   Prolactin   Result Value Ref Range    Prolactin 61.09 (H) 4.79 - 23.30 ug/L   Urine Drug Screen   Result Value Ref Range    Amphetamine Screen, Ur NEGATIVE NEGATIVE    Barbiturate Screen, Ur NEGATIVE NEGATIVE    Benzodiazepine Screen, Urine NEGATIVE NEGATIVE    Cocaine Metabolite, Urine NEGATIVE NEGATIVE    Methadone Screen, Urine NEGATIVE NEGATIVE    Opiates, Urine NEGATIVE NEGATIVE    Phencyclidine, Urine NEGATIVE NEGATIVE    Propoxyphene, Urine NOT REPORTED NEGATIVE    Cannabinoid dorsiflexion however this is her baseline and she ambulates normally with the chair assistance. Patient has no sensory loss does have some suprapubic tenderness concerning for UTI especially in the setting of her febrile status. Plan will be stroke work-up CT head neurology consult as it is not clear when her last one was and she is currently asymptomatic. RADIOLOGY:  Ct Head Wo Contrast    Result Date: 12/13/2020  EXAMINATION: CT OF THE HEAD WITHOUT CONTRAST  12/13/2020 9:38 pm TECHNIQUE: CT of the head was performed without the administration of intravenous contrast. Dose modulation, iterative reconstruction, and/or weight based adjustment of the mA/kV was utilized to reduce the radiation dose to as low as reasonably achievable. COMPARISON: April 30, 2018 HISTORY: ORDERING SYSTEM PROVIDED HISTORY: schizophasia currently resolved TECHNOLOGIST PROVIDED HISTORY: schizophasia currently resolved Reason for Exam: Traceya [place] currently resolved - Aspasia Acuity: Acute Type of Exam: Initial FINDINGS: BRAIN/VENTRICLES: There is no acute intracranial hemorrhage, mass effect or midline shift. No abnormal extra-axial fluid collection. The gray-white differentiation is maintained without evidence of an acute infarct. There is no evidence of hydrocephalus. Mild cortical atrophy. ORBITS: The visualized portion of the orbits demonstrate no acute abnormality. SINUSES: The visualized paranasal sinuses and mastoid air cells demonstrate no acute abnormality. SOFT TISSUES/SKULL:  No acute abnormality of the visualized skull or soft tissues. No acute intracranial abnormality.      Xr Chest Portable    Result Date: 12/13/2020  EXAMINATION: ONE XRAY VIEW OF THE CHEST 12/13/2020 8:05 pm COMPARISON: November 7, 2020 HISTORY: ORDERING SYSTEM PROVIDED HISTORY: fever evaluation TECHNOLOGIST PROVIDED HISTORY: fever evaluation Reason for Exam: portable upright/ evaluate fever Acuity: Acute Type of Exam: Initial FINDINGS: The lungs are without acute focal process. There is no effusion or pneumothorax. The cardiomediastinal silhouette is without acute process. The osseous structures are without acute process. No acute process. EKG  Sinus rhythm at a rate of 88 normal axis intervals shows QTC mildly prolonged at 493 poor R wave progression Q wave noted in lead III nonspecific ECG. All EKG's are interpreted by the Emergency Department Physician who either signs or Co-signs this chart in the absence of a cardiologist.    EMERGENCY DEPARTMENT COURSE:  ED Course as of Dec 14 0002   Sun Dec 13, 2020   1920 Seen and evaluated workup initiated    [BG]   2037 Neuro will follow recommend admit to medicine.     [BG]   2101 Dispo pending ct imaging    [BG]   2111 Penicillin allergy unsure if can tolerate cephalosporins will give macrobid. Culture sent    [BG]   2200 Will add covid for dispo planning    [BG]   2222 infectious labs sent 1019pm when persistent hypotension identified. Likely source urinary. Patient does not strictly meet sepsis criteria    [BG]   2316 Admitted to intermed    [BG]   2316 Will trend trop repeat ekg and give asa no cp or sob. [BG]   2317 Will hold on ordering heparin drip as has had similar trop in past on further review and at this time I believe these changes are likely related to infectious process in setting of alonso    [BG]   2321 Repeat EKG shows a normal sinus rhythm at a rate of 74 normal axis  R wave progression which has been seen earlier wave remains in the 3 no acute changes    [BG]   2338 [place] screen passed okay for diet    [BG]   Mon Dec 14, 2020   0001 Care Transfer to Dr. Jarrett Raman at 12 AM    [BG]      ED Course User Index  [BG] Jay Gallagher DO         PROCEDURES:  none    CONSULTS:  IP CONSULT TO NEUROLOGY  IP CONSULT TO HOSPITALIST    CRITICAL CARE:  Please see attending note    FINAL IMPRESSION      1. Other speech disturbance    2.  Acute kidney injury ([facility] [address]          DISPOSITION /

## 2020-12-14 NOTE — CONSULTS
[facility] -   Infectious diseases evaluation  admission date 12/13/2020    reason for consultation:   UTI with severe penicillin allergy    Impression :   Current:  · Urinary tract infection with severe penicillin allergy  · Expressive aphasia  · BENEDICT    Other:  ·   Discussion / summary of stay / plan of care   · Patient has severe allergy to penicillin but seems to have tolerated a dose of ceftriaxone in 2020 November, as well as meropenem twice in 2020. ·   Recommendations   · Start meropenem 1g q8   · Plan for 5-7 days according to symptoms  · Post 1 dose Levaquin - stopped because elevated QTc  · Urine cx +lactose fermenting GNR  pending ID by lab  · Post void bladder scan - check if retaining urine    Infection Control Recommendations   · [place] Precautions    Antimicrobial Stewardship Recommendations   · Simplification of therapy  · Targeted therapy      Coordination ofOutpatient Care:   · Estimated Length of IV antimicrobials:  · Patient will need Midline / picc Catheter Insertion:   · Patient will need SNF:  · Patient will need outpatient wound care:     History of Present Illness:   Initial history:  Niranjan Malone is a 76y.o.-year-old female consulted by hospitalist because of severe allergy to penicillin and antibiotic management for UTI. Patient has a severe allergy to penicillin but seems to have tolerated a dose of ceftriaxone in 2020 November, as well as meropenem twice in 2020. Patient initially presented to the hospital for expressive aphasia onset 12/13 that has resolved at this time, she is unsure of duration of symptoms. Patient reports tactile fever. She reports suprapubic pain, dysuria, urgency, and frequency. Unsure if she is retaining urine. She states she has history of recurrent UTI that usually present with dysuria. She denies shortness of breath and reports chronic cough with yellow sputum. White count has been normal 3.5.  Patient has Tmax of 100.1    UA +small leukocyte esterase,   Urine micro: no epithelial cells, WBC 20-50, many bacteria  Urine cx pending      Interval changes  12/14/2020   Patient Vitals for the past 8 hrs:   BP Pulse Resp SpO2   12/14/20 1316 (!) 107/57 98 13 97 %   12/14/20 1310 -- 97 19 96 %   12/14/20 0602 106/85 102 25 91 %   12/14/20 0546 (!) 143/75 95 17 100 %   12/14/20 0530 (!) 150/70 92 17 90 %       Summary of relevant labs:  Labs:  UA +small leukocyte esterase,   Urine micro: no epithelial cells, WBC 20-50, many bacteria  Lactate: 3 - 1.3 - 1.9  Creat 1.23 - 0.98  WBC 4.5 - 3.5  Procal 0.32    Micro:  Blood cx 12/13: no growth 15hrs x2  Urine cx 12/13 lactose fermenting GNR    Imaging:      I have personally reviewed the past medical history, past surgical history, medications, social history, and family history, and I haveupdated the database accordingly. Allergies:   Prednisone, Compazine [prochlorperazine maleate], Penicillin v potassium, and Penicillins     Review of Systems:     Review of Systems   Constitutional: Positive for fever. Negative for chills. HENT: Negative for congestion. Eyes: Negative for photophobia. Respiratory: Positive for cough (chronic). Cardiovascular: Negative for chest pain. Gastrointestinal: Positive for abdominal pain (suprapubic). Genitourinary: Positive for dysuria, frequency and urgency. Negative for hematuria. Musculoskeletal: Negative for arthralgias and back pain. Skin: Negative for color change. Neurological: Positive for speech difficulty. Negative for dizziness. Psychiatric/Behavioral: Negative for agitation. The patient is nervous/anxious. Physical Examination :       Physical Exam  Constitutional:       General: She is not in acute distress. Appearance: Normal appearance. She is obese. She is not ill-appearing. HENT:      Head: Normocephalic and atraumatic.       Nose: Nose normal.      Mouth/Throat:      Mouth: Mucous membranes are moist.   Eyes:      Conjunctiva/sclera: Conjunctivae normal.   Neck:      Musculoskeletal: Normal range of motion. Cardiovascular:      Rate and Rhythm: Normal rate and regular rhythm. Heart sounds: Normal heart sounds. Pulmonary:      Effort: Pulmonary effort is normal. No respiratory distress. Breath sounds: Normal breath sounds. Abdominal:      General: Bowel sounds are normal. There is no distension. Palpations: Abdomen is soft. Tenderness: There is abdominal tenderness. Genitourinary:     Comments: No chu  Skin:     General: Skin is warm and dry. Neurological:      General: No focal deficit present. Mental Status: She is alert and oriented to person, place, and time. Mental status is at baseline. Motor: No weakness. Psychiatric:         Mood and Affect: Mood is anxious.          Past Medical History:     Past Medical History:   Diagnosis Date    Anxiety     Arthritis     Back pain     Bronchitis     Caffeine use     8 coffee / day    Carpal tunnel syndrome     Cataracts, bilateral     Constipation     Depression     Diarrhea     GERD (gastroesophageal reflux disease)     H/O gastric ulcer     Hyperlipidemia     Hypertension     Mumps     MVP (mitral valve prolapse)     Dr. Kailyn Zamora in May 2019    Recurrent UTI     Dr. Anton Gamino    Sinusitis     Tracheostomy in place [facility])     Ulcerative esophagitis     Wellness examination     Dr. Edwin Levine seen in Jan 2020       Past Surgical  History:     Past Surgical History:   Procedure Laterality Date    APPENDECTOMY      CARPAL TUNNEL RELEASE      CHOLECYSTECTOMY, LAPAROSCOPIC  05/22/2020     LAPAROSCOPIC ROBOTIC CHOLECYSTECTOMY, PYLOROPLASTY     CHOLECYSTECTOMY, LAPAROSCOPIC N/A 5/22/2020    XI LAPAROSCOPIC ROBOTIC CHOLECYSTECTOMY, PYLOROPLASTY performed by Jenni Osborne MD at [facility] EGD  3/17/2020         ERCP  05/21/2020    with stent insertion, balloon dilation, sphinctereotomy    ERCP  5/21/2020    ** CASE IN OR WITY GI STAFF** ERCP STENT INSERTION performed by Estelita Smith MD at [facility]    ERCP  5/21/2020    ** CASE IN OR WITH GI STAFF**ERCP SPHINCTER/PAPILLOTOMY performed by Estelita Smith MD at [facility]    ERCP  5/21/2020    ** CASE IN OR WITH GI STAFF**ERCP DILATION BALLOON performed by Estelita Smith MD at [address]      patricia IOL    GASTRIC FUNDOPLICATION N/A 4/41/5758    XI LAPAROSCOPIC ROBOTIC HIATAL HERNIA REPAIR, CHERYL FUNDOPLICATION performed by Joao Avilez MD at [facility] N/A 3/27/2020    EGD PEG TUBE PLACEMENT performed by Joao Avilez MD at [address] [facility] [place]. CATH POWER PICC TRIPLE  3/4/2020         HYSTERECTOMY      Abdominal    JOINT REPLACEMENT Right     right hip    OVARY REMOVAL      RHINOPLASTY      TONSILLECTOMY      TOTAL HIP ARTHROPLASTY      TOTAL NEPHRECTOMY      atrophic from infections, age 13   [facility] TRACHEOSTOMY N/A 3/27/2020    **ADD ON **WANTS 10:00AM **TRACHEOTOMY performed by Joao Avilez MD at [address] N/A 4/2/2020    TRACHEOTOMY EXCHANGE performed by Joao Avilez MD at [address] [facility] 3/17/2020    BEDSIDE EGD ESOPHAGOGASTRODUODENOSCOPY (ICU) performed by Joao Avilez MD at [address] [place] [address] N/A 5/18/2020    EGD BIOPSY performed by Estelita Smith MD at [address] [place] [address]  5/18/2020    EGD DILATION BALLOON performed by Estelita Smith MD at [address] [place] [address] N/A 7/6/2020    ** CASE IN O.R. WITH GI STAFF** EGD ESOPHAGOGASTRODUODENOSCOPY performed by Geno Smyth MD at [address] [place] [address] 11/9/2020    EGD DIAGNOSTIC ONLY performed by Armond Reddy MD at [facility]       Medications:      enoxaparin  40 mg Subcutaneous Daily    aspirin  81 mg Oral Daily    Or    aspirin  300 mg Rectal Daily    atorvastatin  40 mg Oral Nightly    sodium chloride flush  10 mL Intravenous 2 times per day    clonazePAM  0.5 mg Oral BID    [START ON 12/15/2020] pantoprazole  40 mg Oral QAM AC    ferrous sulfate  325 mg Oral Daily with breakfast    amitriptyline  25 mg Oral Nightly    busPIRone  10 mg Oral TID    metoprolol succinate  25 mg Oral Daily    QUEtiapine  25 mg Oral BID    furosemide  20 mg Oral Daily    docusate sodium  100 mg Oral Daily    sucralfate  1 g Oral 4 times per day    gabapentin  300 mg Oral TID    sulfamethoxazole-trimethoprim  1 tablet Oral 2 times per day    meropenem  1 g Intravenous Q8H       Social History:     Social History     Socioeconomic History    Marital status:       Spouse name: Not on file    Number of children: 2    Years of education: Not on file    Highest education level: Not on file   Occupational History    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   Social Needs    Financial resource strain: Not on file    Food insecurity     Worry: Not on file     Inability: Not on file   Achieved[employer] needs     Medical: Not on file     Non-medical: Not on file   Tobacco Use    Smoking status: Former Smoker     Packs/day: 1.00     Years: 50.00     Pack years: 50.00     Types: Cigarettes    Smokeless tobacco: Never Used    Tobacco comment: quit 1 year ago    Substance and Sexual Activity    Alcohol use: No    Drug use: No    Sexual activity: Never   Lifestyle    Physical activity     Days per week: Not on file     Minutes per session: Not on file    Stress: Not on file   Relationships    Social connections     Talks on phone: Not on file     Gets together: Not on file     Attends Mandaeism service: Not on file     Active member of club or organization: Not on file     Attends meetings of clubs or organizations: Not on file     Relationship status: Not on file    Intimate partner violence     Fear of current or ex partner: Not on file     Emotionally abused: Not on file     Physically abused: Not on file     Forced sexual activity: Not on file   Other Topics Concern    Not on file   Social History Narrative    Not on file       Family History:     Family History   Problem Relation Age of Onset    Cancer Mother         uterine    Heart Attack Mother     Heart Attack Father     Other Maternal Grandfather         foot gangrene    Other Paternal Grandmother         bleeding ulcers    Other Paternal Grandfather         lung cancer      Medical Decision Making:   I have independently reviewed/ordered the following labs:    CBC with Differential:   Recent Labs     12/13/20 1944 12/14/20  0551   WBC 4.7 3.5   HGB 12.2 11.9   HCT 39.2 39.4    See Reflexed IPF Result   LYMPHOPCT 4*  --    MONOPCT 6  --      BMP:  Recent Labs     12/13/20 1944 12/14/20  0551   * 135   K 3.2* 4.0   CL 95* 101   CO2 21 24   BUN 13 10   CREATININE 1.23* 0.98*   MG  --  1.8     Hepatic Function Panel:   Recent Labs     12/14/20  0551   PROT 6.3*   LABALBU 3.2*   BILITOT 0.20*   ALKPHOS 115*   ALT 19   AST 28     No results for input(s): RPR in the last 72 hours. No results for input(s): HIV in the last 72 hours. No results for input(s): BC in the last 72 hours. Lab Results   Component Value Date    CREATININE 0.98 12/14/2020    GLUCOSE 128 12/14/2020       Detailed results: Thank you for allowing us to participate in the care of this patient. Please call with questions. This note is created with the assistance of a speech recognition program.  While intending to generate adocument that actually reflects the content of the visit, the document can still have some errors including those of syntax and sound a like substitutions which may escape proof reading. It such instances, actual meaningcan be extrapolated by contextual diversion.     [address]o  Office: [phone]  Perfect serve / office [phone]        I have discussed the care of the patient, including pertinent history and exam findings,  with the student. I have seen and examined the patient and the key elements of all parts of the encounter have been performed by me.   I agree with the assessment, plan and orders as documented by the student    Philly Ewa, Infectious Diseases

## 2020-12-14 NOTE — ED PROVIDER NOTES
[facility]   Emergency Department  Emergency Medicine Attending Sign-out     Care of Shan Wilcox was assumed from previous attending Dr. Rakan Coyle and is being seen for Aphasia  . The patient's initial evaluation and plan have been discussed with the prior provider who initially evaluated the patient. Attestation  I was available and discussed any additional care issues that arose and coordinated the management plans with the resident(s) caring for the patient during my duty period. Any areas of disagreement with resident's documentation of care or procedures are noted on the chart. I was personally present for the key portions of any/all procedures, during my duty period. I have documented in the chart those procedures where I was not present during the key portions. BRIEF PATIENT SUMMARY/MDM COURSE PER INITIAL PROVIDER:   RECENT VITALS:     Temp: 100.1 °F (37.8 °C),  Pulse: 72, Resp: 18, BP: (!) 92/51, SpO2: 97 %    This patient is a 76 y.o. Female with episode of aphasia at [facility] that completely resolved. Does have a UTI.       DIAGNOSTICS/MEDICATIONS:     MEDICATIONS GIVEN:  ED Medication Orders (From admission, onward)    Start Ordered     Status Ordering Provider    12/13/20 2230 12/13/20 2219  0.9 % sodium chloride bolus  ONCE      Last MAR action: New Bag - by Lee Bowser [facility]13[facility]20 [facility]    12/13/20 2230 12/13/20 2220  levoFLOXacin (LEVAQUIN) 500 MG/100ML infusion 500 mg  ONCE     Question:  Antimicrobial Indications  Answer:  Urinary Tract Infection    Last MAR action: New Bag - by Lee Bowser on 12/13/20 at [facility]    12/13/20 2115 12/13/20 2101  nitrofurantoin (macrocrystal-monohydrate) (MACROBID) capsule 100 mg  ONCE      Last MAR action: Not Given - by Lee Bowser on 12/13/20 at [facility]    12/13/20 2100 12/13/20 2046  0.9 % sodium chloride bolus  ONCE      Last MAR action: Stopped - by Lee Bowser on 12/13/20 at 2152 12/13/2020 8:05 pm COMPARISON: November 7, 2020 HISTORY: ORDERING SYSTEM PROVIDED HISTORY: fever evaluation TECHNOLOGIST PROVIDED HISTORY: fever evaluation Reason for Exam: portable upright/ evaluate fever Acuity: Acute Type of Exam: Initial FINDINGS: The lungs are without acute focal process. There is no effusion or pneumothorax. The cardiomediastinal silhouette is without acute process. The osseous structures are without acute process. No acute process. OUTSTANDING TASKS / ADDITIONAL COMMENTS   1. Admit     Of note does have a UTI, but does not meet sepsis criteria at this time. She has had some hypotension and elevated lactic. May be due to dehydration. Does not have an elevated wbc. Was tachypneic initially but no tachycardia and afebrile. Repeat EKG 23:15 NSR with no signs of ischemia.        Heron Goetz MD  Emergency Medicine Attending  [facility]        Raj Singh MD  12/13/20 6728       Raj Singh MD  12/13/20 5556

## 2020-12-14 NOTE — PROGRESS NOTES
Speech Language Pathology  Facility/Department: [facility] ED  Initial Speech/Language/Cognitive Assessment    NAME: Robb Mckeon  : 1945   MRN: [id number]  ADMISSION DATE: 2020  ADMITTING DIAGNOSIS: has Frequency of urination; Back pain; GERD (gastroesophageal reflux disease); MVP (mitral valve prolapse); Depression; Anxiety; BENEDICT (acute kidney injury) ([address] Dysphagia; Hiatal hernia; History of repair of hiatal hernia; Centrilobular emphysema ([address] Esophageal dilatation; Shock ([address] Fever; Critical illness polyneuropathy ([address] Gastric outlet obstruction; Tracheostomy in place [facility]); H/O gastric ulcer; Hyperlipidemia; Hypertension; Tobacco abuse; Chronic respiratory failure with hypoxia ([address] Status post insertion of percutaneous endoscopic gastrostomy (PEG) tube ([address] S/P Nissen fundoplication (without gastrostomy tube) procedure; Acute blood loss anemia; Phlebitis after infusion; Esophagitis determined by endoscopy; Sepsis ([address] Expressive aphasia; and Transient speech disturbance on their problem list.    Date of Eval: 2020   Evaluating Therapist: NASEEM Hughes    Primary Complaint: Robb Mckeon is a 76 y.o. female who presents by EMS from a nursing facility. Patient states that earlier today at an unknown time she had what she describes as the fact that she was trying to see specific words however the words that came out were not the words that she was trying to say. He denies any slurring of her speech or garbled speech. This appears to be schizoaphasia. Patient reports mild headache denies any trauma unknown last known well. Patient has a history of A. fib not on anticoagulation. Patient denies any focal weakness, numbness or paresthesias. She is asymptomatic currently.     Pain:  Pain Assessment  Pain Level: 0    Assessment:  Pt. Presents with mild-moderate cognitive deficits characterized by difficulty with immediate recall of 5 units, delayed recall of 3 units, abstract reasoning, deductive reasoning and word generation. No oral motor deficits, no dysarthria noted. ST to follow up to address noted deficits. Education provided. Recommendations:  Requires SLP Intervention: Yes  Duration/Frequency of Treatment: 3-5 x week  D/C Recommendations: Further therapy recommended at discharge. Plan:   Goals:  Short-term Goals  Goal 1: Pt. with recall 3-5 units with and without distractions with 90% accuracy. Goal 2: Pt. will utilize memory compensatory strategies to aid in recall. Goal 3: Pt. will complete abstract and deductive reasoning tasks with 90% accuracy. Goal 4: Pt. will generate 8-10 members of a category with 90% accuracy. Patient/family involved in developing goals and treatment plan: yes    Subjective:  General  Chart Reviewed: Yes  Family / Caregiver Present: No  Social/Functional History  Type of Home: Facility  Vision  Vision: Within Functional Limits  Hearing  Hearing: Within functional limits           Objective:     Oral/Motor  Oral Motor: Within functional limits      Expression  Primary Mode of Expression: Verbal      Motor Speech  Motor Speech: Within Functional Limits         Cognition:      Orientation  Overall Orientation Status: Within Normal Limits  Attention  Attention: Within Functional Limits  Memory  Memory: Exceptions to [facility]  Short-term Memory: Moderate(2/3)  Immediate Memory: Moderate(3/3, 1/5, 3/3)  Problem Solving  Problem Solving: Exceptions to [facility]  Verbal Reasoning Skills: Mild-[place](Similarities/Differences: 3/4, Deductive Reasonin/3)  Safety/Judgement  Safety/Judgement: Exceptions to [facility]  Flexibility of Thought: Mild(2/3)   Word Associations:   Within Functional Limits  Verbal Sequencing:  Within Functional Limits  Word Generation:  Mild    Prognosis:  Speech Therapy Prognosis  Prognosis: Fair  Individuals consulted  Consulted and agree with results and recommendations: Patient    Education:  Patient

## 2020-12-14 NOTE — ED NOTES
Pt requesting home meds, Dr. Cortés Daily notified and stated he will order meds.         Ambar Carlson RN  12/14/20 1016

## 2020-12-14 NOTE — ED NOTES
Bed: 37  Expected date:   Expected time:   Means of arrival:   Comments:     Shaista Haynes RN  12/14/20 [id number]

## 2020-12-14 NOTE — ED TRIAGE NOTES
Pt arrived to ed c/o slurred speech at unknown time. Per pt \"I was thinking something to say and the words coming out were not what I was thinking\" pt is alert, speech is clear, oriented x4. Pt resides from [facility], pt is not ambulatory majority of the time, is wheelchair bound.

## 2020-12-14 NOTE — H&P
changes, CP, SOB, dizziness/light headedness, numbness, tingling, or weakness. On arrival to the ER, patient remains asymptomatic and without further episodes. However, she is noted to be febrile and hypotensive with BP: 92/51. Patient endorses suprapubic pain without dysuria, hematuria, urgency, or frequency. Denies any previous fevers, body aches or chills. She does have a history of recurrent UTIs. Presenting labs reveal:   Na: 131, K: 3.2, Cl: 95, CRT: 1.23, GFR: 43, lactic Acid: 3.0, procalcitonin: 0.32, Trop: 60-->57, prolactin: 61.09, WBC: 4.7, HGB: 12.2, UA: +bacteria and leuk esterase with moderate hgb. CT head: Negative for acute findings, CXR: negative, and COVID-19 Negative. Neurology is consulted in ER for expressive aphasia and patient is admitted to [facility] for further management of sepsis. On my evaluation, patient is very anxious and tearful stating she is terrified to be in the hospital right now'. She is hemodynamically stable, denies CP, SOB, ABD pain, n/v/d. Speech is clear, she is alert and oriented, without any neuro deficits.     Past Medical History:     Past Medical History:   Diagnosis Date    Anxiety     Arthritis     Back pain     Bronchitis     Caffeine use     8 coffee / day    Carpal tunnel syndrome     Cataracts, bilateral     Constipation     Depression     Diarrhea     GERD (gastroesophageal reflux disease)     H/O gastric ulcer     Hyperlipidemia     Hypertension     Mumps     MVP (mitral valve prolapse)     Dr. Mary Farrell in May 2019    Recurrent UTI     Dr. Sanna Galvez    Sinusitis     Tracheostomy in place [facility])     Ulcerative esophagitis     Wellness examination     Dr. Shari Paget seen in Jan 2020        Past Surgical History:     Past Surgical History:   Procedure Laterality Date    APPENDECTOMY      CARPAL TUNNEL RELEASE      CHOLECYSTECTOMY, LAPAROSCOPIC  05/22/2020     LAPAROSCOPIC ROBOTIC CHOLECYSTECTOMY, PYLOROPLASTY     CHOLECYSTECTOMY, LAPAROSCOPIC N/A 5/22/2020    XI LAPAROSCOPIC ROBOTIC CHOLECYSTECTOMY, PYLOROPLASTY performed by Nick Golden MD at [facility] EGD  3/17/2020         ERCP  05/21/2020    with stent insertion, balloon dilation, sphinctereotomy    ERCP  5/21/2020    ** CASE IN OR WITY GI STAFF** ERCP STENT INSERTION performed by Kelly Miller MD at [facility]    ERCP  5/21/2020    ** CASE IN OR WITH GI STAFF**ERCP SPHINCTER/PAPILLOTOMY performed by Kelly Miller MD at [facility]    ERCP  5/21/2020    ** CASE IN OR WITH GI STAFF**ERCP DILATION BALLOON performed by Kelly Miller MD at [address]    GASTRIC FUNDOPLICATION N/A 5/95/8031    XI LAPAROSCOPIC ROBOTIC HIATAL HERNIA REPAIR, CHERYL FUNDOPLICATION performed by Nick Golden MD at [facility] N/A 3/27/2020    EGD PEG TUBE PLACEMENT performed by Nick Golden MD at [address] [facility] POWER PICC TRIPLE  3/4/2020         HYSTERECTOMY      Abdominal    JOINT REPLACEMENT Right     right hip    OVARY REMOVAL      RHINOPLASTY      TONSILLECTOMY      TOTAL HIP ARTHROPLASTY      TOTAL NEPHRECTOMY      atrophic from infections, age 13   [facility] TRACHEOSTOMY N/A 3/27/2020    **ADD ON **WANTS 10:00AM **TRACHEOTOMY performed by Nick Golden MD at [address] N/A 4/2/2020    TRACHEOTOMY EXCHANGE performed by Nick Golden MD at [address] [facility] 3/17/2020    BEDSIDE EGD ESOPHAGOGASTRODUODENOSCOPY (ICU) performed by Nick Golden MD at [facility] 5/18/2020    EGD BIOPSY performed by Kelly Miller MD at [facility]  5/18/2020    EGD DILATION BALLOON performed by Kelly Miller MD at [facility] N/A 7/6/2020    ** CASE IN O.R. WITH GI STAFF** EGD traZODone (DESYREL) 100 MG tablet Take 1 tablet by mouth nightly 4/6/20   Korin Ghotra MD RA VITAMIN D-3 50 MCG (2000 UT) CAPS take 1 capsule by mouth once daily 2/14/20   Historical Provider, MD   Oyster Shell 500 MG TABS Take 500 mg by mouth 2 times daily     Historical Provider, MD   Acetaminophen 500 MG CAPS Take 1 tablet by mouth as needed for Pain (follow OTC instructions)    Historical Provider, MD   calcium carbonate (TUMS) 500 MG chewable tablet Take 1 tablet by mouth 3 times daily as needed for Heartburn    Historical Provider, MD   gabapentin (NEURONTIN) 300 MG capsule Take 300 mg by mouth 3 times daily. Historical Provider, MD   simvastatin (ZOCOR) 40 MG tablet Take 40 mg by mouth nightly. Historical Provider, MD        Allergies:     Prednisone, Compazine [prochlorperazine maleate], Penicillin v potassium, and Penicillins    Social History:     Tobacco:    reports that she has quit smoking. Her smoking use included cigarettes. She has a 50.00 pack-year smoking history. She has never used smokeless tobacco.  Alcohol:      reports no history of alcohol use. Drug Use:  reports no history of drug use. Family History:     Family History   Problem Relation Age of Onset    Cancer Mother         uterine    Heart Attack Mother     Heart Attack Father     Other Maternal Grandfather         foot gangrene    Other Paternal Grandmother         bleeding ulcers    Other Paternal Grandfather         lung cancer       Review of Systems:     Positive and Negative as described in HPI. Review of Systems   Constitutional: Positive for fever. Negative for appetite change, diaphoresis and fatigue. HENT: Negative for sore throat and trouble swallowing. Eyes: Negative for photophobia and visual disturbance. Respiratory: Negative for cough, chest tightness, shortness of breath and wheezing. Cardiovascular: Negative for chest pain, palpitations and leg swelling.    Gastrointestinal: Negative for abdominal pain, constipation, diarrhea and nausea. Genitourinary: Positive for pelvic pain. Negative for decreased urine volume, difficulty urinating, dysuria, flank pain, hematuria and urgency. Musculoskeletal: Positive for gait problem. Negative for arthralgias, myalgias, neck pain and neck stiffness. Skin: Negative for color change, rash and wound. Neurological: Positive for tremors, speech difficulty and headaches. Negative for dizziness, syncope, facial asymmetry, weakness and light-headedness. Psychiatric/Behavioral: Negative for agitation, behavioral problems and confusion. The patient is nervous/anxious. Physical Exam:   BP (!) 92/51   Pulse 72   Temp 100.1 °F (37.8 °C) (Oral)   Resp 18   Ht 5' 3\" (1.6 m)   Wt 180 lb (81.6 kg)   SpO2 97%   BMI 31.89 kg/m²   Temp (24hrs), Av.1 °F (37.8 °C), Min:100.1 °F (37.8 °C), Max:100.1 °F (37.8 °C)    No results for input(s): POCGLU in the last 72 hours. Intake/Output Summary (Last 24 hours) at 2020 0533  Last data filed at 2020  Gross per 24 hour   Intake --   Output 200 ml   Net -200 ml       Physical Exam  Vitals signs and nursing note reviewed. Constitutional:       General: She is in acute distress. Appearance: Normal appearance. She is obese. She is ill-appearing. She is not toxic-appearing or diaphoretic. HENT:      Head: Normocephalic and atraumatic. Right Ear: External ear normal.      Left Ear: External ear normal.      Nose: Nose normal. No congestion or rhinorrhea. Mouth/Throat:      Mouth: Mucous membranes are dry. Pharynx: Oropharynx is clear. Eyes:      Extraocular Movements: Extraocular movements intact. Conjunctiva/sclera: Conjunctivae normal.      Pupils: Pupils are equal, round, and reactive to light. Neck:      Musculoskeletal: Normal range of motion and neck supple. No neck rigidity or muscular tenderness.    Cardiovascular:      Rate and Rhythm: Normal rate and regular rhythm. Pulses: Normal pulses. Heart sounds: Normal heart sounds. No murmur. No friction rub. No gallop. Pulmonary:      Effort: Pulmonary effort is normal. No respiratory distress. Breath sounds: Normal breath sounds. No wheezing, rhonchi or rales. Abdominal:      General: There is no distension. Palpations: Abdomen is soft. There is no mass. Tenderness: There is no abdominal tenderness. Comments: PEG tube in place, not using   Musculoskeletal:         General: Deformity present. No tenderness or signs of injury. Right lower leg: No edema. Left lower leg: No edema. Skin:     General: Skin is warm. Capillary Refill: Capillary refill takes less than 2 seconds. Coloration: Skin is pale. Skin is not jaundiced. Findings: No bruising or lesion. Neurological:      General: No focal deficit present. Mental Status: She is alert and oriented to person, place, and time. Mental status is at baseline. Cranial Nerves: No cranial nerve deficit. Sensory: No sensory deficit. Motor: No weakness. Psychiatric:         Mood and Affect: Mood normal.         Behavior: Behavior normal.         Thought Content:  Thought content normal.         Judgment: Judgment normal.         Investigations:      Laboratory Testing:  Recent Results (from the past 24 hour(s))   STROKE PANEL    Collection Time: 12/13/20  7:44 PM   Result Value Ref Range    Glucose 148 (H) 70 - 99 mg/dL    BUN 13 8 - 23 mg/dL    CREATININE 1.23 (H) 0.50 - 0.90 mg/dL    Bun/Cre Ratio NOT REPORTED 9 - 20    Calcium 8.6 8.6 - 10.4 mg/dL    Sodium 131 (L) 135 - 144 mmol/L    Potassium 3.2 (L) 3.7 - 5.3 mmol/L    Chloride 95 (L) 98 - 107 mmol/L    CO2 21 20 - 31 mmol/L    Anion Gap 15 9 - 17 mmol/L    GFR Non-African American 43 (L) >60 mL/min    GFR  52 (L) >60 mL/min    GFR Comment          GFR Staging NOT REPORTED     WBC 4.7 3.5 - 11.3 k/uL    RBC 4.45 3.95 - 5.11 Specimen: Other   Result Value Ref Range    SARS-CoV-2          SARS-CoV-2, Rapid Not Detected Not Detected    Source . NASOPHARYNGEAL SWAB     SARS-CoV-2         Troponin    Collection Time: 12/13/20 10:14 PM   Result Value Ref Range    Troponin, High Sensitivity 60 (HH) 0 - 14 ng/L    Troponin T NOT REPORTED <0.03 ng/mL    Troponin Interp NOT REPORTED    Protime-INR    Collection Time: 12/13/20 10:39 PM   Result Value Ref Range    Protime 10.8 9.0 - 12.0 sec    INR 1.0    Lactate, Sepsis    Collection Time: 12/13/20 10:39 PM   Result Value Ref Range    Lactic Acid, Sepsis NOT REPORTED 0.5 - 1.9 mmol/L    Lactic Acid, Sepsis, Whole Blood 1.2 0.5 - 1.9 mmol/L   APTT    Collection Time: 12/13/20 10:39 PM   Result Value Ref Range    PTT 27.4 20.5 - 30.5 sec   Troponin    Collection Time: 12/13/20 11:18 PM   Result Value Ref Range    Troponin, High Sensitivity 58 (HH) 0 - 14 ng/L    Troponin T NOT REPORTED <0.03 ng/mL    Troponin Interp NOT REPORTED    Lactate, Sepsis    Collection Time: 12/14/20  1:49 AM   Result Value Ref Range    Lactic Acid, Sepsis NOT REPORTED 0.5 - 1.9 mmol/L    Lactic Acid, Sepsis, Whole Blood 1.9 0.5 - 1.9 mmol/L   TROP/MYOGLOBIN    Collection Time: 12/14/20  2:41 AM   Result Value Ref Range    Troponin, High Sensitivity 57 (HH) 0 - 14 ng/L    Troponin T NOT REPORTED <0.03 ng/mL    Troponin Interp NOT REPORTED     Myoglobin 53 25 - 58 ng/mL   Lactate, Sepsis    Collection Time: 12/14/20  3:56 AM   Result Value Ref Range    Lactic Acid, Sepsis NOT REPORTED 0.5 - 1.9 mmol/L    Lactic Acid, Sepsis, Whole Blood 1.7 0.5 - 1.9 mmol/L       Imaging/Diagnostics:  Ct Head Wo Contrast    Result Date: 12/13/2020  No acute intracranial abnormality. Xr Chest Portable    Result Date: 12/13/2020  No acute process.        Assessment :      Hospital Problems           Last Modified POA    * (Principal) Sepsis ([address] 12/14/2020 Yes    GERD (gastroesophageal reflux disease) 12/14/2020 Yes    Depression 12/14/2020 Yes    Anxiety 12/14/2020 Yes    BENEDICT (acute kidney injury) ([facility] 12/14/2020 Yes    Hyperlipidemia 12/14/2020 Yes    Hypertension 12/14/2020 Yes    S/P Nissen fundoplication (without gastrostomy tube) procedure 12/14/2020 Yes    Expressive aphasia 12/14/2020 Yes          Plan:     Patient status inpatient in the Med/Surge    1. Sepsis: urinary source identified, follow urine and blood cultures, continue IV fluids, received adequate IV fluid bolus per sepsis pathway, and continue antibiotics  2. Expressive aphasia: Neurology following, MRI and MRA later today and EEG. Consider metabolic causes as well in light of sepsis and BENEDICT  3. BENEDICT: 2/1, continue IV fluids, trend renal function, and avoid nephrotoxic agents. Strict I&O and daily weights  4. Elevated Troponin: continue to trend, suspect due to Type II causes, no active chest pain, continuous telemetry  5. Anxiety/Depression: resume home medication regimen, QTC wnl  6. Severe GERD s/p nissen fundoplication  7. HTN: controlled at present  8. Hx: hypoxc respiratory failure requiring trach/PEG s/p GI bleed. Tracheostomy de cannulated and PEG tube remains in place, but not in use  9. Follow daily labs and keep NPO for now and RN swallow eval before advancing diet  10. DVT prophylaixs: Lovenox   11. Full Code    Consultations:   IP CONSULT TO NEUROLOGY  IP CONSULT TO HOSPITALIST    Patient is admitted as inpatient status because of co-morbidities listed above, severity of signs and symptoms as outlined, requirement for current medical therapies and most importantly because of direct risk to patient if care not provided in a hospital setting. Expected length of stay > 48 hours.     MARGOTH Stephen NP  12/14/2020  5:33 AM    Copy sent to Dr. Nikolas Faustin MD

## 2020-12-14 NOTE — ED NOTES
Pt updating son on cell phone of plan of care  Pt denies needs at  This time  Speaking clearly, alert, oriented,   Will continue to monitor     Lorenzo Atwood RN  12/13/20 2039

## 2020-12-14 NOTE — ED NOTES
[place] provided  Alert, oriented, speaking clearly   No acute distress noted  Will continue to monitor     Shepard Goldmann, RN  12/13/20 2126

## 2020-12-14 NOTE — ED NOTES
Report taken from Kenan Mccray, [place]. Pt now in room 37. On full cardiac monitoring.      Aisha Morrison RN  12/14/20 0755

## 2020-12-15 LAB
ABSOLUTE EOS #: 0.03 K/UL (ref 0–0.4)
ABSOLUTE IMMATURE GRANULOCYTE: 0.03 K/UL (ref 0–0.3)
ABSOLUTE LYMPH #: 0.92 K/UL (ref 1–4.8)
ABSOLUTE MONO #: 0.2 K/UL (ref 0.1–0.8)
ANION GAP SERPL CALCULATED.3IONS-SCNC: 10 MMOL/L (ref 9–17)
BASOPHILS # BLD: 0 % (ref 0–2)
BASOPHILS ABSOLUTE: 0 K/UL (ref 0–0.2)
BUN BLDV-MCNC: 11 MG/DL (ref 8–23)
BUN/CREAT BLD: ABNORMAL (ref 9–20)
CALCIUM SERPL-MCNC: 8.9 MG/DL (ref 8.6–10.4)
CHLORIDE BLD-SCNC: 103 MMOL/L (ref 98–107)
CO2: 21 MMOL/L (ref 20–31)
CREAT SERPL-MCNC: 0.77 MG/DL (ref 0.5–0.9)
CULTURE: ABNORMAL
DIFFERENTIAL TYPE: ABNORMAL
EOSINOPHILS RELATIVE PERCENT: 1 % (ref 1–4)
GFR AFRICAN AMERICAN: >60 ML/MIN
GFR NON-AFRICAN AMERICAN: >60 ML/MIN
GFR SERPL CREATININE-BSD FRML MDRD: ABNORMAL ML/MIN/{1.73_M2}
GFR SERPL CREATININE-BSD FRML MDRD: ABNORMAL ML/MIN/{1.73_M2}
GLUCOSE BLD-MCNC: 102 MG/DL (ref 70–99)
HCT VFR BLD CALC: 34.7 % (ref 36.3–47.1)
HEMOGLOBIN: 10.7 G/DL (ref 11.9–15.1)
IMMATURE GRANULOCYTES: 1 %
LACTIC ACID, SEPSIS WHOLE BLOOD: 1.8 MMOL/L (ref 0.5–1.9)
LACTIC ACID, SEPSIS WHOLE BLOOD: 2.4 MMOL/L (ref 0.5–1.9)
LACTIC ACID, SEPSIS: ABNORMAL MMOL/L (ref 0.5–1.9)
LACTIC ACID, SEPSIS: NORMAL MMOL/L (ref 0.5–1.9)
LV EF: 56 %
LVEF MODALITY: NORMAL
LYMPHOCYTES # BLD: 27 % (ref 24–44)
Lab: ABNORMAL
MAGNESIUM: 2.1 MG/DL (ref 1.6–2.6)
MCH RBC QN AUTO: 27.6 PG (ref 25.2–33.5)
MCHC RBC AUTO-ENTMCNC: 30.8 G/DL (ref 28.4–34.8)
MCV RBC AUTO: 89.7 FL (ref 82.6–102.9)
MONOCYTES # BLD: 6 % (ref 1–7)
MORPHOLOGY: ABNORMAL
NRBC AUTOMATED: 0 PER 100 WBC
PDW BLD-RTO: 16.4 % (ref 11.8–14.4)
PLATELET # BLD: 143 K/UL (ref 138–453)
PLATELET ESTIMATE: ABNORMAL
PMV BLD AUTO: 10.9 FL (ref 8.1–13.5)
POTASSIUM SERPL-SCNC: 4.2 MMOL/L (ref 3.7–5.3)
RBC # BLD: 3.87 M/UL (ref 3.95–5.11)
RBC # BLD: ABNORMAL 10*6/UL
SEG NEUTROPHILS: 65 % (ref 36–66)
SEGMENTED NEUTROPHILS ABSOLUTE COUNT: 2.22 K/UL (ref 1.8–7.7)
SODIUM BLD-SCNC: 134 MMOL/L (ref 135–144)
SPECIMEN DESCRIPTION: ABNORMAL
WBC # BLD: 3.4 K/UL (ref 3.5–11.3)
WBC # BLD: ABNORMAL 10*3/UL

## 2020-12-15 PROCEDURE — 80048 BASIC METABOLIC PNL TOTAL CA: CPT

## 2020-12-15 PROCEDURE — 93306 TTE W/DOPPLER COMPLETE: CPT

## 2020-12-15 PROCEDURE — 2580000003 HC RX 258: Performed by: NURSE PRACTITIONER

## 2020-12-15 PROCEDURE — 6360000002 HC RX W HCPCS: Performed by: INTERNAL MEDICINE

## 2020-12-15 PROCEDURE — 36415 COLL VENOUS BLD VENIPUNCTURE: CPT

## 2020-12-15 PROCEDURE — 6370000000 HC RX 637 (ALT 250 FOR IP): Performed by: NURSE PRACTITIONER

## 2020-12-15 PROCEDURE — 6360000002 HC RX W HCPCS: Performed by: NURSE PRACTITIONER

## 2020-12-15 PROCEDURE — 83605 ASSAY OF LACTIC ACID: CPT

## 2020-12-15 PROCEDURE — 2580000003 HC RX 258: Performed by: INTERNAL MEDICINE

## 2020-12-15 PROCEDURE — 99232 SBSQ HOSP IP/OBS MODERATE 35: CPT | Performed by: INTERNAL MEDICINE

## 2020-12-15 PROCEDURE — 2060000000 HC ICU INTERMEDIATE R&B

## 2020-12-15 PROCEDURE — 6370000000 HC RX 637 (ALT 250 FOR IP): Performed by: INTERNAL MEDICINE

## 2020-12-15 PROCEDURE — 85025 COMPLETE CBC W/AUTO DIFF WBC: CPT

## 2020-12-15 PROCEDURE — 97129 THER IVNTJ 1ST 15 MIN: CPT

## 2020-12-15 PROCEDURE — 83735 ASSAY OF MAGNESIUM: CPT

## 2020-12-15 PROCEDURE — 99233 SBSQ HOSP IP/OBS HIGH 50: CPT | Performed by: INTERNAL MEDICINE

## 2020-12-15 RX ORDER — NITROFURANTOIN 25; 75 MG/1; MG/1
100 CAPSULE ORAL 2 TIMES DAILY
Qty: 14 CAPSULE | Refills: 0
Start: 2020-12-15 | End: 2020-12-22

## 2020-12-15 RX ORDER — DOXYCYCLINE HYCLATE 100 MG
100 TABLET ORAL EVERY 12 HOURS SCHEDULED
Status: DISCONTINUED | OUTPATIENT
Start: 2020-12-15 | End: 2020-12-16 | Stop reason: HOSPADM

## 2020-12-15 RX ORDER — LIDOCAINE 4 G/G
1 PATCH TOPICAL DAILY
Status: DISCONTINUED | OUTPATIENT
Start: 2020-12-15 | End: 2020-12-16 | Stop reason: HOSPADM

## 2020-12-15 RX ORDER — 0.9 % SODIUM CHLORIDE 0.9 %
500 INTRAVENOUS SOLUTION INTRAVENOUS ONCE
Status: COMPLETED | OUTPATIENT
Start: 2020-12-15 | End: 2020-12-15

## 2020-12-15 RX ORDER — DEXTROSE, SODIUM CHLORIDE, SODIUM LACTATE, POTASSIUM CHLORIDE, AND CALCIUM CHLORIDE 5; .6; .31; .03; .02 G/100ML; G/100ML; G/100ML; G/100ML; G/100ML
INJECTION, SOLUTION INTRAVENOUS CONTINUOUS
Status: ACTIVE | OUTPATIENT
Start: 2020-12-15 | End: 2020-12-16

## 2020-12-15 RX ORDER — DOXYCYCLINE HYCLATE 100 MG
100 TABLET ORAL EVERY 12 HOURS SCHEDULED
Qty: 14 TABLET | Refills: 0
Start: 2020-12-15 | End: 2020-12-22

## 2020-12-15 RX ORDER — LORAZEPAM 0.5 MG/1
0.5 TABLET ORAL EVERY 4 HOURS PRN
Status: DISCONTINUED | OUTPATIENT
Start: 2020-12-15 | End: 2020-12-16 | Stop reason: HOSPADM

## 2020-12-15 RX ADMIN — CLONAZEPAM 0.5 MG: 0.5 TABLET ORAL at 11:45

## 2020-12-15 RX ADMIN — SUCRALFATE 1 G: 1 TABLET ORAL at 05:43

## 2020-12-15 RX ADMIN — SODIUM CHLORIDE 500 ML: 0.9 INJECTION, SOLUTION INTRAVENOUS at 01:30

## 2020-12-15 RX ADMIN — ONDANSETRON 4 MG: 2 INJECTION INTRAMUSCULAR; INTRAVENOUS at 21:20

## 2020-12-15 RX ADMIN — SUCRALFATE 1 G: 1 TABLET ORAL at 11:45

## 2020-12-15 RX ADMIN — TRAZODONE HYDROCHLORIDE 100 MG: 100 TABLET ORAL at 21:08

## 2020-12-15 RX ADMIN — CLONAZEPAM 0.5 MG: 0.5 TABLET ORAL at 21:09

## 2020-12-15 RX ADMIN — PANTOPRAZOLE SODIUM 40 MG: 40 TABLET, DELAYED RELEASE ORAL at 06:43

## 2020-12-15 RX ADMIN — ENOXAPARIN SODIUM 40 MG: 40 INJECTION SUBCUTANEOUS at 08:34

## 2020-12-15 RX ADMIN — LORAZEPAM 0.5 MG: 0.5 TABLET ORAL at 19:01

## 2020-12-15 RX ADMIN — AMITRIPTYLINE HYDROCHLORIDE 25 MG: 25 TABLET, FILM COATED ORAL at 21:09

## 2020-12-15 RX ADMIN — MEROPENEM 1 G: 1 INJECTION, POWDER, FOR SOLUTION INTRAVENOUS at 21:09

## 2020-12-15 RX ADMIN — DOXYCYCLINE HYCLATE 100 MG: 100 TABLET, COATED ORAL at 21:09

## 2020-12-15 RX ADMIN — BUSPIRONE HYDROCHLORIDE 10 MG: 10 TABLET ORAL at 21:09

## 2020-12-15 RX ADMIN — ACETAMINOPHEN 650 MG: 325 TABLET ORAL at 21:09

## 2020-12-15 RX ADMIN — QUETIAPINE FUMARATE 25 MG: 25 TABLET ORAL at 21:08

## 2020-12-15 RX ADMIN — SODIUM CHLORIDE, SODIUM LACTATE, POTASSIUM CHLORIDE, CALCIUM CHLORIDE AND DEXTROSE MONOHYDRATE: 5; 600; 310; 30; 20 INJECTION, SOLUTION INTRAVENOUS at 19:40

## 2020-12-15 RX ADMIN — DOCUSATE SODIUM 100 MG: 100 CAPSULE, LIQUID FILLED ORAL at 08:32

## 2020-12-15 RX ADMIN — SODIUM CHLORIDE, PRESERVATIVE FREE 10 ML: 5 INJECTION INTRAVENOUS at 21:10

## 2020-12-15 RX ADMIN — QUETIAPINE FUMARATE 25 MG: 25 TABLET ORAL at 08:32

## 2020-12-15 RX ADMIN — MEROPENEM 1 G: 1 INJECTION, POWDER, FOR SOLUTION INTRAVENOUS at 14:43

## 2020-12-15 RX ADMIN — MEROPENEM 1 G: 1 INJECTION, POWDER, FOR SOLUTION INTRAVENOUS at 04:11

## 2020-12-15 RX ADMIN — BUSPIRONE HYDROCHLORIDE 10 MG: 10 TABLET ORAL at 15:25

## 2020-12-15 RX ADMIN — GABAPENTIN 300 MG: 600 TABLET ORAL at 21:09

## 2020-12-15 RX ADMIN — HYPROMELLOSE 2906 (4000 MPA.S) AND HYPROMELLOSE 2906 (50 MPA.S) 1 DROP: 25; 25 SOLUTION OPHTHALMIC at 00:36

## 2020-12-15 RX ADMIN — HYPROMELLOSE 2906 (4000 MPA.S) AND HYPROMELLOSE 2906 (50 MPA.S) 1 DROP: 25; 25 SOLUTION OPHTHALMIC at 21:12

## 2020-12-15 RX ADMIN — SUCRALFATE 1 G: 1 TABLET ORAL at 19:01

## 2020-12-15 RX ADMIN — SUCRALFATE 1 G: 1 TABLET ORAL at 00:36

## 2020-12-15 RX ADMIN — BUSPIRONE HYDROCHLORIDE 10 MG: 10 TABLET ORAL at 08:34

## 2020-12-15 RX ADMIN — Medication 81 MG: at 08:31

## 2020-12-15 RX ADMIN — GABAPENTIN 300 MG: 600 TABLET ORAL at 08:32

## 2020-12-15 RX ADMIN — GABAPENTIN 300 MG: 600 TABLET ORAL at 15:25

## 2020-12-15 RX ADMIN — ATORVASTATIN CALCIUM 40 MG: 80 TABLET, FILM COATED ORAL at 21:09

## 2020-12-15 RX ADMIN — SUCRALFATE 1 G: 1 TABLET ORAL at 23:13

## 2020-12-15 ASSESSMENT — PAIN SCALES - GENERAL
PAINLEVEL_OUTOF10: 3
PAINLEVEL_OUTOF10: 7
PAINLEVEL_OUTOF10: 7
PAINLEVEL_OUTOF10: 3
PAINLEVEL_OUTOF10: 3
PAINLEVEL_OUTOF10: 7

## 2020-12-15 ASSESSMENT — ENCOUNTER SYMPTOMS
PHOTOPHOBIA: 0
BACK PAIN: 0
COLOR CHANGE: 0
COUGH: 1
ABDOMINAL PAIN: 1

## 2020-12-15 ASSESSMENT — PAIN DESCRIPTION - PAIN TYPE
TYPE: CHRONIC PAIN;ACUTE PAIN
TYPE: CHRONIC PAIN;ACUTE PAIN

## 2020-12-15 ASSESSMENT — PAIN DESCRIPTION - LOCATION: LOCATION: ABDOMEN;KNEE

## 2020-12-15 ASSESSMENT — PAIN - FUNCTIONAL ASSESSMENT: PAIN_FUNCTIONAL_ASSESSMENT: PREVENTS OR INTERFERES SOME ACTIVE ACTIVITIES AND ADLS

## 2020-12-15 ASSESSMENT — PAIN DESCRIPTION - DESCRIPTORS
DESCRIPTORS: ACHING;DISCOMFORT
DESCRIPTORS: ACHING;DISCOMFORT

## 2020-12-15 ASSESSMENT — PAIN DESCRIPTION - ORIENTATION
ORIENTATION: RIGHT;LEFT
ORIENTATION: RIGHT;LEFT;LOWER

## 2020-12-15 ASSESSMENT — PAIN DESCRIPTION - FREQUENCY: FREQUENCY: INTERMITTENT

## 2020-12-15 ASSESSMENT — PAIN DESCRIPTION - PROGRESSION: CLINICAL_PROGRESSION: NOT CHANGED

## 2020-12-15 ASSESSMENT — PAIN DESCRIPTION - ONSET: ONSET: ON-GOING

## 2020-12-15 NOTE — ED NOTES
Pt O2 increased from 3L to 4L NC d/t O2 sat 88-90% while resting.       Beryl Espino RN  12/15/20 7721

## 2020-12-15 NOTE — DISCHARGE INSTR - COC
Continuity of Care Form    Patient Name: Deon Whitman   :  1945  MRN:  [id number]    Admit date:  2020  Discharge date:  2020    Code Status Order: Full Code   Advance Directives:      Admitting Physician:  Og Jara DO  PCP: Singh Mullins MD    Discharging Nurse: JERED [facility] Unit/Room#: 4947/9296-25  Discharging Unit Phone Number: [phone]    Emergency Contact:   Extended Emergency Contact Information  Primary Emergency Contact: Hamilton Ramirez  Home Phone: [phone]  Relation: Child    Past Surgical History:  Past Surgical History:   Procedure Laterality Date    APPENDECTOMY      CARPAL TUNNEL RELEASE      CHOLECYSTECTOMY, LAPAROSCOPIC  2020     LAPAROSCOPIC ROBOTIC CHOLECYSTECTOMY, PYLOROPLASTY     CHOLECYSTECTOMY, LAPAROSCOPIC N/A 2020    XI LAPAROSCOPIC ROBOTIC CHOLECYSTECTOMY, PYLOROPLASTY performed by Yassine Argueta MD at [address]      EGD  3/17/2020         ERCP  2020    with stent insertion, balloon dilation, sphinctereotomy    ERCP  2020    ** CASE IN OR WITY GI STAFF** ERCP STENT INSERTION performed by Loki Dc MD at [facility]    ERCP  2020    ** CASE IN OR WITH GI STAFF**ERCP SPHINCTER/PAPILLOTOMY performed by Loki Dc MD at [facility]    ERCP  2020    ** CASE IN OR WITH GI STAFF**ERCP DILATION BALLOON performed by Loki Dc MD at [address]    GASTRIC FUNDOPLICATION N/A 3/55/6927    XI LAPAROSCOPIC ROBOTIC HIATAL HERNIA REPAIR, CHERYL FUNDOPLICATION performed by Yassine Argueta MD at [facility] N/A 3/27/2020    EGD PEG TUBE PLACEMENT performed by Yassine Argueta MD at [address] [facility] [place]. CATH POWER PICC TRIPLE  3/4/2020         HYSTERECTOMY      Abdominal    JOINT REPLACEMENT Right     right hip    OVARY REMOVAL      RHINOPLASTY      TONSILLECTOMY      TOTAL HIP ARTHROPLASTY      TOTAL NEPHRECTOMY      atrophic from infections, age 13   [facility] TRACHEOSTOMY N/A 3/27/2020    **ADD ON **WANTS 10:00AM **TRACHEOTOMY performed by Julián Gillette MD at [address] 4/2/2020    TRACHEOTOMY EXCHANGE performed by Julián Gillette MD at [facility] 3/17/2020    BEDSIDE EGD ESOPHAGOGASTRODUODENOSCOPY (ICU) performed by Julián Gillette MD at [address] [place] [address] N/A 5/18/2020    EGD BIOPSY performed by Nicky Chen MD at [address] [place] [address]  5/18/2020    EGD DILATION BALLOON performed by Nicky Chen MD at [address] [place] [address] N/A 7/6/2020    ** CASE IN O.R. WITH GI STAFF** EGD ESOPHAGOGASTRODUODENOSCOPY performed by Hero Solis MD at [address] [place] [address] N/A 11/9/2020    EGD DIAGNOSTIC ONLY performed by Navid Ignacio MD at [facility]       Immunization History: There is no immunization history on file for this patient.     Active Problems:  Patient Active Problem List   Diagnosis Code    Frequency of urination R35.0    Back pain M54.9    GERD (gastroesophageal reflux disease) K21.9    MVP (mitral valve prolapse) I34.1    Depression F32.9    Anxiety F41.9    BENEDICT (acute kidney injury) ([address] N17.9    Dysphagia R13.10    Hiatal hernia K44.9    History of repair of hiatal hernia Z98.890, Z87.19    Centrilobular emphysema ([address] J43.2    Esophageal dilatation K22.8    Shock ([address] R57.9    Fever R50.9    Critical illness polyneuropathy ([address] G62.81    Gastric outlet obstruction K31.1    Tracheostomy in place ([address] Z93.0    H/O gastric ulcer Z87.19    Hyperlipidemia E78.5    Hypertension I10    Tobacco abuse Z72.0    Chronic respiratory failure with hypoxia (HCC) J96.11    Status post insertion of percutaneous endoscopic gastrostomy (PEG) tube ([address] Z93.1    S/P Nissen fundoplication (without gastrostomy tube) procedure Z98.890    Acute blood loss anemia D62    Phlebitis after infusion T80. 1XXA, I80.9    Esophagitis determined by endoscopy K20.90    Sepsis ([address] A41.9    Expressive aphasia R47.01    Transient speech disturbance R47.9    Speech disturbance R47.9       Isolation/Infection:   Isolation            Contact          Patient Infection Status       Infection Onset Added Last Indicated Last Indicated By Review Planned Expiration Resolved Resolved By    ESBL (Extended Spectrum Beta Lactamase) 12/13/20 12/15/20 12/13/20 Culture, Urine        MDRO (multi-drug resistant organism) 12/13/20 12/15/20 12/13/20 Culture, Urine        Resolved    COVID-19 Rule Out 12/13/20 12/13/20 12/13/20 COVID-19 (Ordered)   12/13/20 Rule-Out Test Resulted    COVID-19 Rule Out 07/05/20 07/05/20 07/05/20 COVID-19 (Ordered)   07/05/20 Rule-Out Test Resulted    C-diff Rule Out 05/27/20 05/27/20 05/27/20 C DIFF TOXIN/ANTIGEN (Ordered)   05/29/20 Karen Coley RN    COVID-19 Rule Out 05/20/20 05/20/20 05/20/20 COVID-19 (Ordered)   05/21/20 Rule-Out Test Resulted    COVID-19 Rule Out 05/14/20 05/14/20 05/14/20 COVID-19 (Ordered)   05/14/20 Rule-Out Test Resulted            Nurse Assessment:  Last Vital Signs: BP (!) 106/47   Pulse 77   Temp 98.2 °F (36.8 °C) (Oral)   Resp 16   Ht 5' 3\" (1.6 m)   Wt 192 lb 0.3 oz (87.1 kg)   SpO2 95%   BMI 34.01 kg/m²     Last documented pain score (0-10 scale): Pain Level: 3  Last Weight:   Wt Readings from Last 1 Encounters:   12/15/20 192 lb 0.3 oz (87.1 kg)     Mental Status:  oriented and alert    IV Access:  - None    Nursing Mobility/ADLs:  Walking   Assisted  Transfer  Assisted  Bathing  Dependent  Dressing  Assisted  Toileting  Assisted  Feeding  Independent  Med Admin  Assisted  Med Delivery   whole    Wound Care Documentation and Therapy:  Wound 04/01/20 Neck Mid;Distal non-healing area of tracheostomy incision (Active)   Number of days: 258       Wound 07/06/20 Coccyx Stage 1 (Active)   Number of days: 161        Elimination:  Continence:   · Bowel: Yes  · Bladder: Yes  Urinary Catheter: None   Colostomy/Ileostomy/Ileal Conduit: No       Date of Last BM: 12/15/2020    Intake/Output Summary (Last 24 hours) at 12/15/2020 1306  Last data filed at 12/15/2020 0127  Gross per 24 hour   Intake 200 ml   Output --   Net 200 ml     I/O last 3 completed shifts: In: 200 [IV Piggyback:200]  Out: -     Safety Concerns: At Risk for Falls    Impairments/Disabilities:      None    Nutrition Therapy:  Current Nutrition Therapy:   - Oral Diet:  Cardiac    Routes of Feeding: Oral  Liquids: Thin Liquids  Daily Fluid Restriction: no  Last Modified Barium Swallow with Video (Video Swallowing Test): not done    Treatments at the Time of Hospital Discharge:   Respiratory Treatments: ***  Oxygen Therapy:  is not on home oxygen therapy.   Ventilator:    - No ventilator support    Rehab Therapies: Physical Therapy and Occupational Therapy  Weight Bearing Status/Restrictions: No weight bearing restirctions  Other Medical Equipment (for information only, NOT a DME order):  wheelchair  Other Treatments: ***    Patient's personal belongings (please select all that are sent with patient):  None    RN SIGNATURE:  Electronically signed by Emily Douglas RN on 12/16/20 at 2:55 PM EST    CASE MANAGEMENT/SOCIAL WORK SECTION    Inpatient Status Date: 12/13/2020    Readmission Risk Assessment Score:  Readmission Risk              Risk of Unplanned Readmission:        30           Discharging to Facility/ Agency   · Name:    [facility]  · Address:   [address]  · Phone:     [phone]  · Fax:     [phone]    Dialysis Facility (if applicable)   · Name:  · Address:  · Dialysis Schedule:  · Phone:  · Fax:    / signature: Electronically signed by Neel Purdy RN on 12/16/20 at 11:53 AM EST    PHYSICIAN SECTION    Prognosis: Fair    Condition

## 2020-12-15 NOTE — CARE COORDINATION
Transitional Planning  Received call from Tita Mahajan at [facility] ([phone]), states she spoke with patient's son about patient returning there at discharge, however requested we send a referral for her to review. Spoke to patient's son Kolton Danielle ([phone]) via phone, confirmed plan for her to return to [facility]. States she has lived there for about the last 6 months. eReferral sent.

## 2020-12-15 NOTE — PROGRESS NOTES
Bronchitis     Caffeine use     8 coffee / day    Carpal tunnel syndrome     Cataracts, bilateral     Constipation     Depression     Diarrhea     GERD (gastroesophageal reflux disease)     H/O gastric ulcer     Hyperlipidemia     Hypertension     Mumps     MVP (mitral valve prolapse)     Dr. Kerry Stringer in May 2019    Recurrent UTI     Dr. Carlene Brito    Sinusitis     Tracheostomy in place [facility])     Ulcerative esophagitis     Wellness examination     Dr. Peter Aguilar seen in Jan 2020        Past Surgical History:     Past Surgical History:   Procedure Laterality Date    APPENDECTOMY     [address], LAPAROSCOPIC  05/22/2020     LAPAROSCOPIC ROBOTIC CHOLECYSTECTOMY, PYLOROPLASTY     CHOLECYSTECTOMY, LAPAROSCOPIC N/A 5/22/2020    XI LAPAROSCOPIC ROBOTIC CHOLECYSTECTOMY, PYLOROPLASTY performed by Flaco Reyes MD at [address]      EGD  3/17/2020         ERCP  05/21/2020    with stent insertion, balloon dilation, sphinctereotomy    ERCP  5/21/2020    ** CASE IN OR WITY GI STAFF** ERCP STENT INSERTION performed by Kvng Valverde MD at [facility]    ERCP  5/21/2020    ** CASE IN OR WITH GI STAFF**ERCP SPHINCTER/PAPILLOTOMY performed by Kvng Valverde MD at [facility]    ERCP  5/21/2020    ** CASE IN OR WITH GI STAFF**ERCP DILATION BALLOON performed by Kvng Valverde MD at [address]    GASTRIC FUNDOPLICATION N/A 9/94/7456    XI LAPAROSCOPIC ROBOTIC HIATAL HERNIA REPAIR, CHERYL FUNDOPLICATION performed by Flaco Reyes MD at [facility] N/A 3/27/2020    EGD PEG TUBE PLACEMENT performed by Flaco Reyes MD at [address] [facility] [place], [facility]. CATH POWER PICC TRIPLE  3/4/2020         HYSTERECTOMY      Abdominal    JOINT REPLACEMENT Right     right hip    OVARY REMOVAL      RHINOPLASTY      TONSILLECTOMY      TOTAL HIP ARTHROPLASTY      TOTAL NEPHRECTOMY      atrophic from infections, age 13   [facility] TRACHEOSTOMY N/A 3/27/2020    **ADD ON **WANTS 10:00AM **TRACHEOTOMY performed by Braydon Hoang MD at [address] 4/2/2020    TRACHEOTOMY EXCHANGE performed by Braydon Hoang MD at [address] [facility] 3/17/2020    BEDSIDE EGD ESOPHAGOGASTRODUODENOSCOPY (ICU) performed by Braydon Hoang MD at [address] [place] [address] 5/18/2020    EGD BIOPSY performed by Aileen Guzman MD at [address] [place] [address]  5/18/2020    EGD DILATION BALLOON performed by Aileen Guzman MD at [address] [place] [address] N/A 7/6/2020    ** CASE IN O.R. WITH GI STAFF** EGD ESOPHAGOGASTRODUODENOSCOPY performed by Venkat Williamson MD at [address] [place] [address] N/A 11/9/2020    EGD DIAGNOSTIC ONLY performed by Mac Penaloza MD at \[facility]\"" Endoscopy        Medications Prior to Admission:     Prior to Admission medications    Medication Sig Start Date End Date Taking? Authorizing Provider   metoclopramide (REGLAN) 5 MG tablet Take 5 mg by mouth 4 times daily    Historical Provider, MD   benzocaine (ORAJEL) 10 % mucosal gel Take by mouth as needed. Patient not taking: Reported on 11/30/2020 11/10/20   Tia Amezcua DO   clonazePAM (KLONOPIN) 0.5 MG tablet Take 0.5 tablets by mouth 2 times daily for 60 days.  Taking 1/2 tablet BID 7/7/20 11/7/20  Aurora Childress MD   pantoprazole (PROTONIX) 40 MG tablet Take 1 tablet by mouth 2 times daily 7/7/20   Aurora Childress MD   ferrous sulfate (IRON 325) 325 (65 Fe) MG tablet Take 1 tablet by mouth 2 times daily 7/7/20   Aurora Childress MD   amitriptyline (ELAVIL) 25 MG tablet Take 1 tablet by mouth nightly 7/14/20   Aurora Childress MD   busPIRone (BUSPAR) 10 MG tablet Take 2 tablets by mouth 3 times daily 7/14/20   Aurora Childress MD   escitalopram (LEXAPRO) 20 MG tablet Take 1 tablet by mouth daily 7/14/20 Alvino Villarreal MD   midodrine (PROAMATINE) 5 MG tablet Take 1 tablet by mouth 3 times daily as needed (if SBP < 100)  Patient not taking: Reported on 11/30/2020 6/1/20   Casie Peace MD   metoprolol succinate (TOPROL XL) 25 MG extended release tablet Take 1 tablet by mouth daily 5/30/20   Casie Peace MD   QUEtiapine (SEROQUEL) 100 MG tablet Take 1 tablet by mouth nightly 4/6/20   Sondra Das MD   furosemide (LASIX) 20 MG tablet Take 1 tablet by mouth daily 4/6/20   Sondra Das MD   docusate (COLACE) 50 MG/5ML liquid 10 mLs by Per G Tube route 2 times daily 4/6/20   Sondra Das MD   sucralfate (CARAFATE) 1 GM tablet Take 1 tablet by mouth 4 times daily 4/6/20   Sondra Das MD   traZODone (DESYREL) 100 MG tablet Take 1 tablet by mouth nightly 4/6/20   Sondra Das MD   RA VITAMIN D-3 50 MCG (2000 UT) CAPS take 1 capsule by mouth once daily 2/14/20   Historical Provider, MD   Oyster Shell 500 MG TABS Take 500 mg by mouth 2 times daily     Historical Provider, MD   Acetaminophen 500 MG CAPS Take 1 tablet by mouth as needed for Pain (follow OTC instructions)    Historical Provider, MD   calcium carbonate (TUMS) 500 MG chewable tablet Take 1 tablet by mouth 3 times daily as needed for Heartburn    Historical Provider, MD   gabapentin (NEURONTIN) 300 MG capsule Take 300 mg by mouth 3 times daily. Historical Provider, MD   simvastatin (ZOCOR) 40 MG tablet Take 40 mg by mouth nightly. Historical Provider, MD        Allergies:     Prednisone, Compazine [prochlorperazine maleate], Penicillin v potassium, and Penicillins    Social History:     Tobacco:    reports that she has quit smoking. Her smoking use included cigarettes. She has a 50.00 pack-year smoking history. She has never used smokeless tobacco.  Alcohol:      reports no history of alcohol use. Drug Use:  reports no history of drug use.     Family History:     Family History   Problem Relation Age of Onset Reflex to MG    Collection Time: 12/15/20  6:00 AM   Result Value Ref Range    Glucose 102 (H) 70 - 99 mg/dL    BUN 11 8 - 23 mg/dL    CREATININE 0.77 0.50 - 0.90 mg/dL    Bun/Cre Ratio NOT REPORTED 9 - 20    Calcium 8.9 8.6 - 10.4 mg/dL    Sodium 134 (L) 135 - 144 mmol/L    Potassium 4.2 3.7 - 5.3 mmol/L    Chloride 103 98 - 107 mmol/L    CO2 21 20 - 31 mmol/L    Anion Gap 10 9 - 17 mmol/L    GFR Non-African American >60 >60 mL/min    GFR African American >60 >60 mL/min    GFR Comment          GFR Staging NOT REPORTED          Assessment :      Primary Problem  Sepsis [facility])    Active Hospital Problems    Diagnosis Date Noted    Expressive aphasia [R47.01] 12/14/2020    [facility] [A41.9] 12/13/2020    S/P Nissen fundoplication (without gastrostomy tube) procedure [Z98.890] 05/12/2020    Hypertension [I10]     Hyperlipidemia [E78.5]     BENEDICT (acute kidney injury) ([facility] [N17.9] 05/01/2018    Depression [F32.9]     Anxiety [F41.9]     GERD (gastroesophageal reflux disease) [K21.9]        Plan:     29-year-old female who was evaluated for expressive aphasia. Patient was hypotensive on presentation along with a history of GI bleed. She was found to have Klebsiella pneumonia and is currently on IV antibiotics. Neurologically, she is returned to baseline, she had a transient expressive aphasia for approximately 5 minutes with no similar symptoms in the past.  EEG did show mild focal cortical dysfunction in the left frontal region, it is possible that her underlying episode was secondary to a UTI. We will continue to monitor.     Neurological-expressive aphasia  -Continue to monitor for any neurological changes  -EEG-mild focal cortical dysfunction of the left frontal region  -If okay with GI, can place the patient on 81 mg of aspirin  -Continue statin therapy  -A1c 4.9  -  -We will continue to follow, PT/OT pending  -Continue antibiotics, additional management as per primary team            Follow-up further recommendations after discussing the case with attending  The plan was discussed with the patient, patient's family and the medical staff. Consultations:   IP CONSULT TO NEUROLOGY  IP CONSULT TO HOSPITALIST    Patient is admitted as inpatient status because of co-morbidities listed above, severity of signs and symptoms as outlined, requirement for current medical therapies and most importantly because of direct risk to patient if care not provided in a hospital setting.     Franky Gregg MD  12/15/2020  11:20 AM    Copy sent to Dr. Jill Tomlinson MD

## 2020-12-15 NOTE — PROGRESS NOTES
[facility]  Office: [address], DO, Arnold Cortez, DO, Golden Diaz, DO, Reny Butler Blood, DO, Humberto Richey MD, Malick Smith MD, Jenny Michael MD, Danita Sims MD, Susan Adler MD, Navneet Barillas MD, Amie Lal MD, Nelda Diaz MD, Kvng Baxter MD, Ariane Lemus, DO, Isabel Damian MD, Franciso Schaumann, MD, Estela Germain DO, Elías Mccallum MD,  Mariola Herron DO, Gaby Young MD, Denman Najjar, MD, Pool Dewey [facility], [facility], CNP, Little Kapoor, CNP, María Elena Walker, CNS, Keagan Walker, CNP, Umberto Khan, CNP, Vianney Mcguire, CNP, Geovanna Jeff, CNP, Reno Espinoza, CNP, Josh Wilcox PA-C, Merlin Lloyd, IVY, Danni Fontaine, CNP, Alayna Langley, CNP, Joann Soriano, CNP, Roseann Grossman, CNP, Warren Kraus, CNP         [facility]   [address] [facility]    Progress Note    12/15/2020    1:49 PM    Name:   Wilfredo James  MRN:     [id number]     Acct:      [de-identified]   Room:   [address] [place] [address] Day:  2  Admit Date:  12/13/2020  7:09 PM    PCP:   Etta Borrero MD  Code Status:  Full Code    Subjective:     C/C:   Chief Complaint   Patient presents with    Aphasia     Interval History Status: not changed. Patient seen and examined at bedside. No acute events overnight. Denies any chest pain, shortness of breath, difficulty breathing, nausea, vomiting, diarrhea. Patient has severe anxiety. Vital signs have been stable, patient was hypotensive overnight with a blood pressure of 89/50. Patient does have severe anxiety    Brief History: This is a 68-year-old female who presented with concerns for urinary tract infection. Patient does have a history of resistant UTI. She was evaluated by infectious disease who started her on meropenem. Patient was at for sepsis. She did develop some hypotension which improved with IV fluids.   Otherwise, patient noted to have expressive aphasia which is currently being worked up with neurology. Review of Systems:     Constitutional:  negative for chills, fevers, sweats  Respiratory:  negative for cough, dyspnea on exertion, shortness of breath, wheezing  Cardiovascular:  negative for chest pain, chest pressure/discomfort, lower extremity edema, palpitations  Gastrointestinal:  negative for abdominal pain, constipation, diarrhea, nausea, vomiting  Neurological: Admits to being anxious but denies any visual changes, weakness. Medications: Allergies:     Allergies   Allergen Reactions    Prednisone Anxiety    Compazine [Prochlorperazine Maleate]     Penicillin V Potassium     Penicillins Other (See Comments)     Shock- received meropenem and ceftriaxone wo issues 2020       Current Meds:   Scheduled Meds:    enoxaparin  40 mg Subcutaneous Daily    aspirin  81 mg Oral Daily    Or    aspirin  300 mg Rectal Daily    atorvastatin  40 mg Oral Nightly    sodium chloride flush  10 mL Intravenous 2 times per day    clonazePAM  0.5 mg Oral BID    pantoprazole  40 mg Oral QAM AC    ferrous sulfate  325 mg Oral Daily with breakfast    amitriptyline  25 mg Oral Nightly    busPIRone  10 mg Oral TID    metoprolol succinate  25 mg Oral Daily    QUEtiapine  25 mg Oral BID    [Held by provider] furosemide  20 mg Oral Daily    docusate sodium  100 mg Oral Daily    sucralfate  1 g Oral 4 times per day    gabapentin  300 mg Oral TID    meropenem  1 g Intravenous Q8H    traZODone  100 mg Oral Nightly     Continuous Infusions:   PRN Meds: magnesium hydroxide, acetaminophen **OR** acetaminophen, ondansetron **OR** ondansetron, calcium carbonate, hydroxypropyl methylcellulose    Data:     Past Medical History:   has a past medical history of Anxiety, Arthritis, Back pain, Bronchitis, Caffeine use, Carpal tunnel syndrome, Cataracts, bilateral, Constipation, Depression, Diarrhea, GERD (gastroesophageal reflux disease), H/O gastric ulcer, Hyperlipidemia, Hypertension, Mumps, MVP (mitral valve prolapse), Recurrent UTI, Sinusitis, Tracheostomy in place [facility]), Ulcerative esophagitis, and Wellness examination. Social History:   reports that she has quit smoking. Her smoking use included cigarettes. She has a 50.00 pack-year smoking history. She has never used smokeless tobacco. She reports that she does not drink alcohol or use drugs. Family History:   Family History   Problem Relation Age of Onset   [facility] Cancer Mother         uterine    Heart Attack Mother     Heart Attack Father     Other Maternal Grandfather         foot gangrene    Other Paternal Grandmother         bleeding ulcers    Other Paternal Grandfather         lung cancer       Vitals:  BP (!) 106/47   Pulse 77   Temp 98.2 °F (36.8 °C) (Oral)   Resp 16   Ht 5' 3\" (1.6 m)   Wt 192 lb 0.3 oz (87.1 kg)   SpO2 95%   BMI 34.01 kg/m²   Temp (24hrs), Av.2 °F (36.8 °C), Min:98.2 °F (36.8 °C), Max:98.2 °F (36.8 °C)    No results for input(s): POCGLU in the last 72 hours. I/O (24Hr):     Intake/Output Summary (Last 24 hours) at 12/15/2020 1349  Last data filed at 12/15/2020 0127  Gross per 24 hour   Intake 200 ml   Output --   Net 200 ml       Labs:  Hematology:  Recent Labs     209 20  0551 12/15/20  0600   WBC 4.7  --  3.5 3.4*   RBC 4.45  --  4.38 3.87*   HGB 12.2  --  11.9 10.7*   HCT 39.2  --  39.4 34.7*   MCV 88.1  --  90.0 89.7   MCH 27.4  --  27.2 27.6   MCHC 31.1  --  30.2 30.8   RDW 16.0*  --  16.2* 16.4*     --  See Reflexed IPF Result 143   MPV 11.2  --  NOT REPORTED 10.9   INR 1.0 1.0 1.0  --      Chemistry:  Recent Labs     20  0241 20  0551 20  1328 12/15/20  0600   *  --   --  135  --  134*   K 3.2*  --   --  4.0  --  4.2   CL 95*  --   --  101  --  103   CO2 21  --   --  24  --  21   GLUCOSE 148*  --   --  128*  --  102*   BUN 13  --   --  10  --  11   CREATININE 1.23*  --   --  0.98*  --  0.77   MG  --   --   --  1.8 --  2.1   ANIONGAP 15  --   --  10  --  10   LABGLOM 43*  --   --  55*  --  >60   GFRAA 52*  --   --  >60  --  >60   CALCIUM 8.6  --   --  8.4*  --  8.9   CAION  --   --   --  1.09*  --   --    PHOS  --   --   --  2.3*  --   --    TROPHS 47*   < > 57* 54* 36*  --    CKTOTAL 82  --   --   --   --   --    CKMB 1.1  --   --   --   --   --    MYOGLOBIN 74*  --  53 38 35  --    LACTACIDWB 3.0*  --   --   --   --   --     < > = values in this interval not displayed. Recent Labs     12/14/20  0551   PROT 6.3*   LABALBU 3.2*   LABA1C 5.0   AST 28   ALT 19   ALKPHOS 115*   BILITOT 0.20*   CHOL 203*   HDL 43   LDLCHOLESTEROL 106   CHOLHDLRATIO 4.7   TRIG 268*   VLDL NOT REPORTED*     ABG:  Lab Results   Component Value Date    POCPH 7.437 05/30/2020    POCPCO2 42.3 05/30/2020    POCPO2 129.9 05/30/2020    POCHCO3 28.6 05/30/2020    NBEA NOT REPORTED 05/30/2020    PBEA 4 05/30/2020    ATE3QBS 30 05/30/2020    IUPD9XIJ 99 05/30/2020    FIO2 40.0 05/30/2020     Lab Results   Component Value Date/Time    SPECIAL RAC 10ML 12/13/2020 10:30 PM     Lab Results   Component Value Date/Time    CULTURE NO GROWTH 2 DAYS 12/13/2020 10:30 PM       Radiology:  Ct Head Wo Contrast    Result Date: 12/13/2020  No acute intracranial abnormality. Mra Head Wo Contrast    Result Date: 12/14/2020  Unremarkable MRA of the head and neck without significant stenosis or evidence for occlusion. Xr Chest Portable    Result Date: 12/13/2020  No acute process. Mra Neck Wo Contrast    Result Date: 12/14/2020  Unremarkable MRA of the head and neck without significant stenosis or evidence for occlusion. Mri Brain Wo Contrast    Result Date: 12/14/2020  Minimal chronic microvascular disease without acute intracranial abnormality.        Physical Examination:        General appearance:  alert, cooperative and appears uncomfortable  Mental Status:  oriented to person, place and time and normal affect, anxious appearing  Lungs:  clear to auscultation bilaterally, normal effort  Heart:  regular rate and rhythm, no murmur  Abdomen:  soft, nontender, nondistended, normal bowel sounds, no masses, hepatomegaly, splenomegaly  Extremities:  no edema, redness, tenderness in the calves  Skin:  no gross lesions, rashes, induration    Assessment:        Hospital Problems           Last Modified POA    * (Principal) Sepsis ([facility] 12/14/2020 Yes    GERD (gastroesophageal reflux disease) 12/14/2020 Yes    Depression 12/14/2020 Yes    Anxiety 12/14/2020 Yes    BENEDICT (acute kidney injury) ([facility] 12/14/2020 Yes    Hyperlipidemia 12/14/2020 Yes    Hypertension 12/14/2020 Yes    S/P Nissen fundoplication (without gastrostomy tube) procedure 12/14/2020 Yes    Expressive aphasia 12/14/2020 Yes          Plan:        1. Sepsis secondary to ESBL Klebsiella: Blood cultures remain negative, see urine culture for sensitivities. Continue meropenem for 5-7 days, infectious disease following. Continue IV LR for 24 hours. Trend lactic acid  2. NSTEMI type II: secondary to sepsis, troponin down trending. Pt does not complain of any chest pain or SOB  3. Expressive aphasia 2/2 TIA: stroke workup negative thus far, check echocardiogram. Continue ASA/Statin, lifestyle modifications. Will need holter monitor/loop and cardiology follow up on outpatient basis to look for underlying afib  4. Hyponatremia: mild, monitor sodium, check urine studies if this does not improve. Likely SIADH  5. Normocytic normochromic anemia: likely anemia of chronic inflammation however will need iron studies and age appropriate cancer screening on discharge. Hold Iron while septic  6. Thrombocytopenia: likely 2/2 sepsis. If continues to drop may need DIC workup, however suspicion currently low. 7. Hyperlipidemia: continue statin   8. Anxiety: continue home regimen  9. GERD: continue PPI  10. PT/OT:  11. DVT ppx: Lovenox  12.  Continue bowel regimen    Devyn Ramos DO  12/15/2020  1:49 PM

## 2020-12-15 NOTE — PROCEDURES
[address] [facility]      ELECTROENCEPHALOGRAM REPORT        REFERRING PHYSICIAN:    PATIENT NAME:Erin Danielle  PATIENT MRN: [id number]  DATE OF EE2020    BRIEF HISTORY:  76year old female presented with episode of expressive aphasia. EEG was ordered to evaluate. CURRENT ANTI-EPILEPTIC MEDICATIONS: GBP 300mg TID    EEG DESCRIPTION:   During maximal wakefulness, the background was well organized and continuous consisting of an admixture of frequency of alpha, beta and theta ranging between 10-50uV. There was a posterior dominant alpha rhythm at 8 Hz, which was symmetric and reactive to eye opening/eye closure. Low amplitude 13-18 Hz beta rhythms were seen symmetrically over the frontal-central head regions. Spontaneous variability to stimulation were present. There was very infrequent intermittent slow in left frontal, in theta frequency, lasted 1-3 seconds. No epileptiform discharges were recorded. No clinical or electrographic seizures were recorded. Sleep patterns were not seen. Hyperventilation was not performed, photic stimulation did not activate abnormal activity. Heart rate was regular at 90s-100s per minute on a single lead EKG. CLASSIFICATION:  Abnormal I (Awake)   1. Intermittent slow, regional, left frontal       IMPRESSION:  This was a mild abnormal routine awake EEG which showed very mild focal cortical dysfunction in left frontal. There is no epileptiform discharges or EEG seizures on this recording.        Carlos Bloom MD, [address] [place] [address], Neurology  Board Certified Epileptologist

## 2020-12-15 NOTE — PROGRESS NOTES
[facility] -   Infectious diseases evaluation  admission date 12/13/2020    reason for consultation:   UTI with severe penicillin allergy    Impression :   Current:  · Urinary tract infection with severe penicillin allergy  · Expressive aphasia  · BENEDICT    Other:  ·   Discussion / summary of stay / plan of care   · Patient has severe allergy to penicillin but seems to have tolerated a dose of ceftriaxone in 2020 November, as well as meropenem twice in 2020. ·   Recommendations   · Start meropenem 1g q8   · Plan for 5-7 days according to symptoms  · Post 1 dose Levaquin - stopped because elevated QTc  · Urine cx + ESBL Klebsiella S meropenem  · Continue meropenem IV while inpatient  · Discharge on Macrobid for 7 days  · Give Doxycycline for 7 days for early R IV phlebitis/cellulitis  · Post void bladder scan - check if retaining urine    Infection Control Recommendations   · [place] Precautions    Antimicrobial Stewardship Recommendations   · Simplification of therapy  · Targeted therapy      Coordination ofOutpatient Care:   · Estimated Length of IV antimicrobials:  · Patient will need Midline / picc Catheter Insertion:   · Patient will need SNF:  · Patient will need outpatient wound care:     History of Present Illness:   Initial history:  Aroldo Jim is a 76y.o.-year-old female consulted by hospitalist because of severe allergy to penicillin and antibiotic management for UTI. Patient has a severe allergy to penicillin but seems to have tolerated a dose of ceftriaxone in 2020 November, as well as meropenem twice in 2020. Patient initially presented to the hospital for expressive aphasia onset 12/13 that has resolved at this time, she is unsure of duration of symptoms. Patient reports tactile fever. She reports suprapubic pain, dysuria, urgency, and frequency. Unsure if she is retaining urine.  She states she has history of recurrent UTI that usually present N/A 3/17/2020    BEDSIDE EGD ESOPHAGOGASTRODUODENOSCOPY (ICU) performed by Jenny Viramontes MD at [address] [place] [address] N/A 5/18/2020    EGD BIOPSY performed by Paulina Langford MD at [address] [place] [address]  5/18/2020    EGD DILATION BALLOON performed by Paulina Langford MD at [address] [place] [address] N/A 7/6/2020    ** CASE IN O.R. WITH GI STAFF** EGD ESOPHAGOGASTRODUODENOSCOPY performed by Markel Quiroz MD at [address] [place] [address] N/A 11/9/2020    EGD DIAGNOSTIC ONLY performed by Cyril Rivas MD at [facility]       Medications:      enoxaparin  40 mg Subcutaneous Daily    aspirin  81 mg Oral Daily    Or    aspirin  300 mg Rectal Daily    atorvastatin  40 mg Oral Nightly    sodium chloride flush  10 mL Intravenous 2 times per day    clonazePAM  0.5 mg Oral BID    pantoprazole  40 mg Oral QAM AC    [Held by provider] ferrous sulfate  325 mg Oral Daily with breakfast    amitriptyline  25 mg Oral Nightly    busPIRone  10 mg Oral TID    metoprolol succinate  25 mg Oral Daily    QUEtiapine  25 mg Oral BID    [Held by provider] furosemide  20 mg Oral Daily    docusate sodium  100 mg Oral Daily    sucralfate  1 g Oral 4 times per day    gabapentin  300 mg Oral TID    meropenem  1 g Intravenous Q8H    traZODone  100 mg Oral Nightly       Social History:     Social History     Socioeconomic History    Marital status:       Spouse name: Not on file    Number of children: 2    Years of education: Not on file    Highest education level: Not on file   Occupational History    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   Social Needs    Financial resource strain: Not on file    Food insecurity     Worry: Not on file     Inability: Not on file   [employer] needs     Medical: Not on file     Non-medical: Not on file   Tobacco Use    Smoking status: Former Smoker     Packs/day: 1.00     Years: 50.00     Pack years: 50.00     Types: Cigarettes    Smokeless tobacco: Never Used    Tobacco comment: quit 1 year ago    Substance and Sexual Activity    Alcohol use: No    Drug use: No    Sexual activity: Never   Lifestyle    Physical activity     Days per week: Not on file     Minutes per session: Not on file    Stress: Not on file   Relationships    Social connections     Talks on phone: Not on file     Gets together: Not on file     Attends Yazdanism service: Not on file     Active member of club or organization: Not on file     Attends meetings of clubs or organizations: Not on file     Relationship status: Not on file    Intimate partner violence     Fear of current or ex partner: Not on file     Emotionally abused: Not on file     Physically abused: Not on file     Forced sexual activity: Not on file   Other Topics Concern    Not on file   Social History Narrative    Not on file       Family History:     Family History   Problem Relation Age of Onset    Cancer Mother         uterine    Heart Attack Mother     Heart Attack Father     Other Maternal Grandfather         foot gangrene    Other Paternal Grandmother         bleeding ulcers    Other Paternal Grandfather         lung cancer      Medical Decision Making:   I have independently reviewed/ordered the following labs:    CBC with Differential:   Recent Labs     12/13/20  1944 12/14/20  0551 12/15/20  0600   WBC 4.7 3.5 3.4*   HGB 12.2 11.9 10.7*   HCT 39.2 39.4 34.7*    See Reflexed IPF Result 143   LYMPHOPCT 4*  --  27   MONOPCT 6  --  6     BMP:  Recent Labs     12/14/20  0551 12/15/20  0600    134*   K 4.0 4.2    103   CO2 24 21   BUN 10 11   CREATININE 0.98* 0.77   MG 1.8 2.1     Hepatic Function Panel:   Recent Labs     12/14/20  0551   PROT 6.3*   LABALBU 3.2*   BILITOT 0.20*   ALKPHOS 115*   ALT 19   AST 28     No results for input(s): RPR in the last 72 hours.   No results for input(s): HIV in the last 72 hours. No results for input(s): BC in the last 72 hours. Lab Results   Component Value Date    CREATININE 0.77 12/15/2020    GLUCOSE 102 12/15/2020       Detailed results: Thank you for allowing us to participate in the care of this patient. Please call with questions. This note is created with the assistance of a speech recognition program.  While intending to generate adocument that actually reflects the content of the visit, the document can still have some errors including those of syntax and sound a like substitutions which may escape proof reading. It such instances, actual meaningcan be extrapolated by contextual diversion. [address] [place] [address]  Office: [phone]  Perfect serve / office [phone]        I have discussed the care of the patient, including pertinent history and exam findings,  with the student. I have seen and examined the patient and the key elements of all parts of the encounter have been performed by me.   I agree with the assessment, plan and orders as documented by the student    Philly Mcneil, Infectious Diseases

## 2020-12-15 NOTE — PROGRESS NOTES
Speech Language Pathology  Speech Language Pathology  [facility]    Cognitive Treatment Note    Date: 12/15/2020  Patients Name: Tony Parrish  MRN: [id number]  Diagnosis:   Patient Active Problem List   Diagnosis Code    Frequency of urination R35.0    Back pain M54.9    GERD (gastroesophageal reflux disease) K21.9    MVP (mitral valve prolapse) I34.1    Depression F32.9    Anxiety F41.9    BENEDICT (acute kidney injury) ([address] N17.9    Dysphagia R13.10    Hiatal hernia K44.9    History of repair of hiatal hernia Z98.890, Z87.19    Centrilobular emphysema ([address] J43.2    Esophageal dilatation K22.8    Shock ([address] R57.9    Fever R50.9    Critical illness polyneuropathy ([address] G62.81    Gastric outlet obstruction K31.1    Tracheostomy in place ([address] Z93.0    H/O gastric ulcer Z87.19    Hyperlipidemia E78.5    Hypertension I10    Tobacco abuse Z72.0    Chronic respiratory failure with hypoxia ([address] J96.11    Status post insertion of percutaneous endoscopic gastrostomy (PEG) tube ([address] Z93.1    S/P Nissen fundoplication (without gastrostomy tube) procedure Z98.890    Acute blood loss anemia D62    Phlebitis after infusion T80. 1XXA, I80.9    Esophagitis determined by endoscopy K20.90    Sepsis ([address] A41.9    Expressive aphasia R47.01    Transient speech disturbance R47.9    Speech disturbance R47.9       Pain: denies    Cognitive Treatment    Treatment time: 8773-9256      Subjective: [x] Alert [x] Cooperative     [] Confused     [] Agitated    [] Lethargic      Objective/Assessment:  Attention: maintained throughout, distractions minimized    Recall: Pt provided written list of memory compensatory strategies & left at bedside. Pt provided education re: associations strategy for recall & verbalized understanding. Delayed Recall, 3 Units: Pt utilized associations strategy with mod verbal cues to encode 3 related words, repeated 3/3 independently.    5-Minute Delay: 3/3 independently   10-Minute Delay: 3/3 independently    Memory & Mental Manipulation, Rankin/5 independently, increased to 5/5 with x1 repetition    Organization:  [place] Category Members: Pt named 8 items in 1/3 presented categories, increased to 2/3 with max verbal cues    Problem Solving/Reasoning:  Word Deductions: 80% (8/10) independently, increased to 100% (10/10) with mod verbal cues. Plan:  [x] Continue ST services    [] Discharge from ST:      Discharge recommendations: Further therapy recommended at discharge. Treatment completed by:  Shayla Ruff M.S. [address] [facility]

## 2020-12-15 NOTE — PROGRESS NOTES
Physician Progress Note      Braeden Juarez  CSN #:                  [id number]  :                       1945  ADMIT DATE:       2020 7:09 PM  DISCH DATE:  RESPONDING  PROVIDER #:        Chintan Martinez TEXT:    Patient admitted with sepsis and has elevated troponins. If possible, please   document in the progress notes and discharge summary if you are evaluating   and/or treating any of the following: The medical record reflects the following:  Risk Factors: sepsis, BENEDICT  Clinical Indicators: troponins [id number], per H&P \"Elevated Troponin:   continue to trend, suspect due to Type II causes, no active chest pain\", EKG   with no signs of ischemia, unchanged from prior  Treatment: serial troponins, EKG, telemetry    Call if any questions. Thank you, [facility], [facility] [phone]  Options provided:  -- Type 2 MI  -- NSTEMI  -- Demand Ischemia with MI  -- Demand Ischemia only, no MI  -- Other - I will add my own diagnosis  -- Disagree - Not applicable / Not valid  -- Disagree - Clinically unable to determine / Unknown  -- Refer to Clinical Documentation Reviewer    PROVIDER RESPONSE TEXT:    This patient has a Type 2 MI. Query created by: Ananya Tejada on 12/15/2020 12:16 PM      Electronically signed by:   Sushant Berger 12/15/2020 1:47 PM

## 2020-12-15 NOTE — ED NOTES
Pt requesting and ordered a fried egg and cheese on an english muffin for breakfast.   Meal tray order faxed to dietary. Labs drawn and correctly labeled, sent to lab via tube system.      Isaac Arzate RN  12/15/20 1635

## 2020-12-15 NOTE — ED NOTES
Pt. Received breakfast tray; denies further needs at this time     Grace Montesinos, JUAREZ  12/15/20 1600

## 2020-12-16 ENCOUNTER — TELEPHONE (OUTPATIENT)
Dept: ONCOLOGY | Age: 75
End: 2020-12-16

## 2020-12-16 VITALS
OXYGEN SATURATION: 97 % | DIASTOLIC BLOOD PRESSURE: 68 MMHG | WEIGHT: 193.34 LBS | BODY MASS INDEX: 34.26 KG/M2 | TEMPERATURE: 98.9 F | SYSTOLIC BLOOD PRESSURE: 137 MMHG | HEIGHT: 63 IN | HEART RATE: 82 BPM | RESPIRATION RATE: 22 BRPM

## 2020-12-16 PROBLEM — A41.9 SEPSIS (HCC): Status: RESOLVED | Noted: 2020-12-13 | Resolved: 2020-12-16

## 2020-12-16 PROBLEM — N17.9 AKI (ACUTE KIDNEY INJURY) (HCC): Status: RESOLVED | Noted: 2018-05-01 | Resolved: 2020-12-16

## 2020-12-16 LAB
ABSOLUTE EOS #: 0.13 K/UL (ref 0–0.4)
ABSOLUTE IMMATURE GRANULOCYTE: 0.03 K/UL (ref 0–0.3)
ABSOLUTE LYMPH #: 0.52 K/UL (ref 1–4.8)
ABSOLUTE MONO #: 0.31 K/UL (ref 0.1–0.8)
BASOPHILS # BLD: 0 % (ref 0–2)
BASOPHILS ABSOLUTE: 0 K/UL (ref 0–0.2)
DIFFERENTIAL TYPE: ABNORMAL
EOSINOPHILS RELATIVE PERCENT: 5 % (ref 1–4)
GLUCOSE BLD-MCNC: 95 MG/DL (ref 65–105)
HCT VFR BLD CALC: 34.7 % (ref 36.3–47.1)
HEMOGLOBIN: 10 G/DL (ref 11.9–15.1)
IMMATURE GRANULOCYTES: 1 %
INTERVENTION: NORMAL
LYMPHOCYTES # BLD: 20 % (ref 24–44)
MCH RBC QN AUTO: 27.3 PG (ref 25.2–33.5)
MCHC RBC AUTO-ENTMCNC: 28.8 G/DL (ref 28.4–34.8)
MCV RBC AUTO: 94.8 FL (ref 82.6–102.9)
MONOCYTES # BLD: 12 % (ref 1–7)
MORPHOLOGY: ABNORMAL
NRBC AUTOMATED: 0 PER 100 WBC
PDW BLD-RTO: 16.4 % (ref 11.8–14.4)
PLATELET # BLD: 159 K/UL (ref 138–453)
PLATELET ESTIMATE: ABNORMAL
PMV BLD AUTO: 10.7 FL (ref 8.1–13.5)
RBC # BLD: 3.66 M/UL (ref 3.95–5.11)
RBC # BLD: ABNORMAL 10*6/UL
SEG NEUTROPHILS: 62 % (ref 36–66)
SEGMENTED NEUTROPHILS ABSOLUTE COUNT: 1.61 K/UL (ref 1.8–7.7)
WBC # BLD: 2.6 K/UL (ref 3.5–11.3)
WBC # BLD: ABNORMAL 10*3/UL

## 2020-12-16 PROCEDURE — 94761 N-INVAS EAR/PLS OXIMETRY MLT: CPT

## 2020-12-16 PROCEDURE — 93271 ECG/MONITORING AND ANALYSIS: CPT

## 2020-12-16 PROCEDURE — 99232 SBSQ HOSP IP/OBS MODERATE 35: CPT | Performed by: INTERNAL MEDICINE

## 2020-12-16 PROCEDURE — 99232 SBSQ HOSP IP/OBS MODERATE 35: CPT | Performed by: PSYCHIATRY & NEUROLOGY

## 2020-12-16 PROCEDURE — 2580000003 HC RX 258: Performed by: INTERNAL MEDICINE

## 2020-12-16 PROCEDURE — 2580000003 HC RX 258: Performed by: NURSE PRACTITIONER

## 2020-12-16 PROCEDURE — 97130 THER IVNTJ EA ADDL 15 MIN: CPT

## 2020-12-16 PROCEDURE — 6370000000 HC RX 637 (ALT 250 FOR IP): Performed by: INTERNAL MEDICINE

## 2020-12-16 PROCEDURE — 36415 COLL VENOUS BLD VENIPUNCTURE: CPT

## 2020-12-16 PROCEDURE — 6360000002 HC RX W HCPCS: Performed by: NURSE PRACTITIONER

## 2020-12-16 PROCEDURE — 99239 HOSP IP/OBS DSCHRG MGMT >30: CPT | Performed by: INTERNAL MEDICINE

## 2020-12-16 PROCEDURE — 6370000000 HC RX 637 (ALT 250 FOR IP): Performed by: NURSE PRACTITIONER

## 2020-12-16 PROCEDURE — 85025 COMPLETE CBC W/AUTO DIFF WBC: CPT

## 2020-12-16 PROCEDURE — 6360000002 HC RX W HCPCS: Performed by: INTERNAL MEDICINE

## 2020-12-16 PROCEDURE — 2700000000 HC OXYGEN THERAPY PER DAY

## 2020-12-16 PROCEDURE — 93270 REMOTE 30 DAY ECG REV/REPORT: CPT

## 2020-12-16 PROCEDURE — 97129 THER IVNTJ 1ST 15 MIN: CPT

## 2020-12-16 PROCEDURE — APPSS180 APP SPLIT SHARED TIME > 60 MINUTES: Performed by: NURSE PRACTITIONER

## 2020-12-16 PROCEDURE — 82947 ASSAY GLUCOSE BLOOD QUANT: CPT

## 2020-12-16 RX ORDER — FUROSEMIDE 20 MG/1
20 TABLET ORAL DAILY
Qty: 60 TABLET | Refills: 3 | Status: ON HOLD | OUTPATIENT
Start: 2020-12-16 | End: 2021-12-19 | Stop reason: HOSPADM

## 2020-12-16 RX ORDER — GABAPENTIN 300 MG/1
300 CAPSULE ORAL 3 TIMES DAILY
Qty: 90 CAPSULE | Refills: 0 | Status: ON HOLD | OUTPATIENT
Start: 2020-12-16 | End: 2021-11-25 | Stop reason: HOSPADM

## 2020-12-16 RX ORDER — CLONAZEPAM 0.5 MG/1
0.25 TABLET ORAL 2 TIMES DAILY
Qty: 60 TABLET | Refills: 0 | Status: ON HOLD | OUTPATIENT
Start: 2020-12-16 | End: 2021-02-10 | Stop reason: HOSPADM

## 2020-12-16 RX ORDER — LANOLIN ALCOHOL/MO/W.PET/CERES
325 CREAM (GRAM) TOPICAL
Qty: 90 TABLET | Refills: 3 | Status: SHIPPED | OUTPATIENT
Start: 2020-12-16

## 2020-12-16 RX ORDER — QUETIAPINE FUMARATE 25 MG/1
25 TABLET, FILM COATED ORAL 2 TIMES DAILY
Qty: 60 TABLET | Refills: 3 | Status: ON HOLD | OUTPATIENT
Start: 2020-12-16 | End: 2021-11-25 | Stop reason: SDUPTHER

## 2020-12-16 RX ORDER — 0.9 % SODIUM CHLORIDE 0.9 %
500 INTRAVENOUS SOLUTION INTRAVENOUS ONCE
Status: COMPLETED | OUTPATIENT
Start: 2020-12-16 | End: 2020-12-16

## 2020-12-16 RX ORDER — SENNA PLUS 8.6 MG/1
1 TABLET ORAL NIGHTLY
Status: DISCONTINUED | OUTPATIENT
Start: 2020-12-16 | End: 2020-12-16 | Stop reason: HOSPADM

## 2020-12-16 RX ADMIN — BUSPIRONE HYDROCHLORIDE 10 MG: 10 TABLET ORAL at 13:49

## 2020-12-16 RX ADMIN — BUSPIRONE HYDROCHLORIDE 10 MG: 10 TABLET ORAL at 08:41

## 2020-12-16 RX ADMIN — GABAPENTIN 300 MG: 600 TABLET ORAL at 08:41

## 2020-12-16 RX ADMIN — DOXYCYCLINE HYCLATE 100 MG: 100 TABLET, COATED ORAL at 08:40

## 2020-12-16 RX ADMIN — LORAZEPAM 0.5 MG: 0.5 TABLET ORAL at 13:53

## 2020-12-16 RX ADMIN — DOCUSATE SODIUM 100 MG: 100 CAPSULE, LIQUID FILLED ORAL at 08:40

## 2020-12-16 RX ADMIN — MEROPENEM 1 G: 1 INJECTION, POWDER, FOR SOLUTION INTRAVENOUS at 04:12

## 2020-12-16 RX ADMIN — GABAPENTIN 300 MG: 600 TABLET ORAL at 13:49

## 2020-12-16 RX ADMIN — SODIUM CHLORIDE, SODIUM LACTATE, POTASSIUM CHLORIDE, CALCIUM CHLORIDE AND DEXTROSE MONOHYDRATE: 5; 600; 310; 30; 20 INJECTION, SOLUTION INTRAVENOUS at 05:30

## 2020-12-16 RX ADMIN — Medication 81 MG: at 08:41

## 2020-12-16 RX ADMIN — SODIUM CHLORIDE, PRESERVATIVE FREE 10 ML: 5 INJECTION INTRAVENOUS at 08:42

## 2020-12-16 RX ADMIN — PANTOPRAZOLE SODIUM 40 MG: 40 TABLET, DELAYED RELEASE ORAL at 05:29

## 2020-12-16 RX ADMIN — SUCRALFATE 1 G: 1 TABLET ORAL at 05:29

## 2020-12-16 RX ADMIN — ENOXAPARIN SODIUM 40 MG: 40 INJECTION SUBCUTANEOUS at 08:41

## 2020-12-16 RX ADMIN — SUCRALFATE 1 G: 1 TABLET ORAL at 13:49

## 2020-12-16 RX ADMIN — SODIUM CHLORIDE 500 ML: 9 INJECTION, SOLUTION INTRAVENOUS at 00:17

## 2020-12-16 RX ADMIN — METOPROLOL SUCCINATE 25 MG: 25 TABLET, FILM COATED, EXTENDED RELEASE ORAL at 08:40

## 2020-12-16 RX ADMIN — CLONAZEPAM 0.5 MG: 0.5 TABLET ORAL at 08:46

## 2020-12-16 RX ADMIN — LORAZEPAM 0.5 MG: 0.5 TABLET ORAL at 04:12

## 2020-12-16 RX ADMIN — QUETIAPINE FUMARATE 25 MG: 25 TABLET ORAL at 08:40

## 2020-12-16 RX ADMIN — ANTACID TABLETS 500 MG: 500 TABLET, CHEWABLE ORAL at 16:12

## 2020-12-16 RX ADMIN — MEROPENEM 1 G: 1 INJECTION, POWDER, FOR SOLUTION INTRAVENOUS at 13:53

## 2020-12-16 ASSESSMENT — ENCOUNTER SYMPTOMS
COUGH: 1
ABDOMINAL PAIN: 1
BACK PAIN: 0
COLOR CHANGE: 0
PHOTOPHOBIA: 0
SHORTNESS OF BREATH: 0

## 2020-12-16 ASSESSMENT — PAIN SCALES - GENERAL: PAINLEVEL_OUTOF10: 3

## 2020-12-16 NOTE — FLOWSHEET NOTE
Event Monitor was applied as ordered. Monitor is to be worn  for 2 weeks. Written and verbal instructions were given. Monitor ID#[id number]. Patient was given a  to return via [employer].   She resides at [facility] in [place], [place].

## 2020-12-16 NOTE — PROGRESS NOTES
Physical Therapy  DATE: 2020    NAME: Robb Mckeon  MRN: [id number]   : 1945    Patient not seen this date for Physical Therapy due to:  [] Blood transfusion in progress  [] Hemodialysis  [x] Patient Declined states that she being dc'd back to the nursing home today  [] Spine Precautions   [] Strict Bedrest  [] Surgery/ Procedure  [] Testing      [] Other        [] PT is being discontinued at this time. Patient independent. No further needs. [] PT is being discontinued at this time due to declining physical/ medical status. Therapy is not appropriate at this time.     Annie Lee, PT

## 2020-12-16 NOTE — PROGRESS NOTES
Speech Language Pathology  Speech Language Pathology  [facility]    Cognitive Treatment Note    Date: 12/16/2020  Patients Name: Finn Madrigal  MRN: [id number]  Diagnosis:   Patient Active Problem List   Diagnosis Code    Frequency of urination R35.0    Back pain M54.9    GERD (gastroesophageal reflux disease) K21.9    MVP (mitral valve prolapse) I34.1    Depression F32.9    Anxiety F41.9    Dysphagia R13.10    Hiatal hernia K44.9    History of repair of hiatal hernia Z98.890, Z87.19    Centrilobular emphysema ([address] J43.2    Esophageal dilatation K22.8    Shock ([address] R57.9    Fever R50.9    Critical illness polyneuropathy ([address] G62.81    Gastric outlet obstruction K31.1    Tracheostomy in place ([address] Z93.0    H/O gastric ulcer Z87.19    Hyperlipidemia E78.5    Hypertension I10    Tobacco abuse Z72.0    Chronic respiratory failure with hypoxia ([address] J96.11    Status post insertion of percutaneous endoscopic gastrostomy (PEG) tube ([address] Z93.1    S/P Nissen fundoplication (without gastrostomy tube) procedure Z98.890    Acute blood loss anemia D62    Phlebitis after infusion T80. 1XXA, I80.9    Esophagitis determined by endoscopy K20.90    Sepsis ([address] A41.9    Expressive aphasia R47.01    Transient speech disturbance R47.9    Speech disturbance R47.9       Pain: 0/10    Cognitive Treatment    Treatment time: 9:16-9:42    Subjective: [x] Alert [x] Cooperative     [] Confused     [] Agitated    [] Lethargic    Objective/Assessment:  Attention: maintained throughout session, pt independently minimized distractions     Recall: Memory compensatory strategies from previous session reviewed with pt. Pt created acronym to facilitate recall.    Delayed recall, 3 associated units: 2/3 increased to 3/3 x1 with min verbal cues (10 minute delay)     Memory and mental manipulation, scrambled sentences: 9/10 increased to 10/10 with word initial cue    Problem Solving/Reasoning: Word generation, 8 concrete units: 1/3 increased to 3/3 with min-mod semantic cues   State the category, concrete: 100% independently   Add to the category, concrete: 1/5 increased to 5/5 with min-mod verbal cues   Similarities and Differences: 3/5 increased to 5/5 with mod-max verbal cues     Plan:  [x] Continue ST services    [] Discharge from [address] [place] [address] Dr:      Discharge recommendations: Further therapy recommended at discharge.     Treatment completed by: Jonathan Waldrop M.S. CF-SLP

## 2020-12-16 NOTE — PROGRESS NOTES
[facility]  Office: [address], DO, Sony Cedillo, DO, Edmund Hilariolita, DO, Yonny Gomez Blood, DO, Alberto Blackburn MD, Christine Espino MD, Isaiah Frost MD, Jair Jim MD, Ramonita Aguilar MD, Neeta Riley MD, Hanna Sharp MD, Cipriano Luke MD, Kvng Schuster MD, Rory Hernandez DO, Avila Gonzalez MD, Nyoka Hodgkins, MD, Néstor Thakkar DO, Jadene Boast, MD,  Mere Wynn DO, Bud Horton MD, Ifrah Lenó MD, Gerda Mcclelland CNP, [facility], CNP, Kylee Graham, CNP, Fercho Norris, CNS, Brenda Rogers, CNP, Jihan Walsh, CNP, Erickson Foot, CNP, Carmelita Davis, CNP, Bruna Stark, CNP, Yennifer Matos PA-C, Andie Bullock DNP, Zain Timmons, CNP, Deonna Pascal, CNP, Katya Burgess, CNP, Diane Puente, CNP, Erin Horne, [address] [place] [address]    Progress Note    12/16/2020    8:51 AM    Name:   Nikhil Mullen  MRN:     [id number]     Acct:      [de-identified]   Room:   [address] [place], [place] [address] Day:  3  Admit Date:  12/13/2020  7:09 PM    PCP:   Nikolay Bocanegra MD  Code Status:  Full Code    Subjective:     C/C:   Chief Complaint   Patient presents with    Aphasia     Interval History Status: improved. Patient seen and evaluated in room sitting in bed in no acute distress. Vital signs are stable. She is anxious to get back to [place]. Antibiotics transition for p.o. intake. No other patient concerns at this time. Holter monitor to continue for 2 weeks with follow-up with neurology    Brief History: This is a 59-year-old female who presented with concerns for urinary tract infection. Patient does have a history of resistant UTI. She was evaluated by infectious disease who started her on meropenem. Patient was at for sepsis. She did develop some hypotension which improved with IV fluids. Otherwise, patient noted to have expressive aphasia which is currently being worked up with neurology.     Review of Systems:     Constitutional:  negative for chills, fevers, sweats  Respiratory:  negative for cough, dyspnea on exertion, shortness of breath, wheezing  Cardiovascular:  negative for chest pain, chest pressure/discomfort, lower extremity edema, palpitations  Gastrointestinal:  negative for abdominal pain, constipation, diarrhea, nausea, vomiting  Neurological:  negative for dizziness, headache    Medications: Allergies:     Allergies   Allergen Reactions    Prednisone Anxiety    Compazine [Prochlorperazine Maleate]     Penicillin V Potassium     Penicillins Other (See Comments)     Shock- received meropenem and ceftriaxone wo issues 2020       Current Meds:   Scheduled Meds:    doxycycline hyclate  100 mg Oral 2 times per day    lidocaine  1 patch Transdermal Daily    enoxaparin  40 mg Subcutaneous Daily    aspirin  81 mg Oral Daily    Or    aspirin  300 mg Rectal Daily    atorvastatin  40 mg Oral Nightly    sodium chloride flush  10 mL Intravenous 2 times per day    clonazePAM  0.5 mg Oral BID    pantoprazole  40 mg Oral QAM AC    [Held by provider] ferrous sulfate  325 mg Oral Daily with breakfast    amitriptyline  25 mg Oral Nightly    busPIRone  10 mg Oral TID    metoprolol succinate  25 mg Oral Daily    QUEtiapine  25 mg Oral BID    [Held by provider] furosemide  20 mg Oral Daily    docusate sodium  100 mg Oral Daily    sucralfate  1 g Oral 4 times per day    gabapentin  300 mg Oral TID    meropenem  1 g Intravenous Q8H    traZODone  100 mg Oral Nightly     Continuous Infusions:    dextrose 5% in lactated ringers 100 mL/hr at 12/16/20 0530     PRN Meds: LORazepam, magnesium hydroxide, acetaminophen **OR** acetaminophen, ondansetron **OR** ondansetron, calcium carbonate, hydroxypropyl methylcellulose    Data:     Past Medical History:   has a past medical history of Anxiety, Arthritis, Back pain, Bronchitis, Caffeine use, Carpal tunnel syndrome, Cataracts, bilateral, Constipation, Depression, Diarrhea, GERD (gastroesophageal reflux disease), H/O gastric ulcer, Hyperlipidemia, Hypertension, Mumps, MVP (mitral valve prolapse), Recurrent UTI, Sinusitis, Tracheostomy in place [facility]), Ulcerative esophagitis, and Wellness examination. Social History:   reports that she has quit smoking. Her smoking use included cigarettes. She has a 50.00 pack-year smoking history. She has never used smokeless tobacco. She reports that she does not drink alcohol or use drugs. Family History:   Family History   Problem Relation Age of Onset   [facility] Cancer Mother         uterine    Heart Attack Mother     Heart Attack Father     Other Maternal Grandfather         foot gangrene    Other Paternal Grandmother         bleeding ulcers    Other Paternal Grandfather         lung cancer       Vitals:  /68   Pulse 72   Temp 100.2 °F (37.9 °C) (Oral)   Resp 19   Ht 5' 3\" (1.6 m)   Wt 193 lb 5.5 oz (87.7 kg)   SpO2 98%   BMI 34.25 kg/m²   Temp (24hrs), Av.7 °F (37.1 °C), Min:97.7 °F (36.5 °C), Max:100.2 °F (37.9 °C)    Recent Labs     20  0013   POCGLU 95       I/O (24Hr):     Intake/Output Summary (Last 24 hours) at 2020 0851  Last data filed at 2020 0849  Gross per 24 hour   Intake 3028 ml   Output 2000 ml   Net 1028 ml       Labs:  Hematology:  Recent Labs     20  1944 20  2239 20  0551 12/15/20  0600 20  0615   WBC 4.7  --  3.5 3.4* 2.6*   RBC 4.45  --  4.38 3.87* 3.66*   HGB 12.2  --  11.9 10.7* 10.0*   HCT 39.2  --  39.4 34.7* 34.7*   MCV 88.1  --  90.0 89.7 94.8   MCH 27.4  --  27.2 27.6 27.3   MCHC 31.1  --  30.2 30.8 28.8   RDW 16.0*  --  16.2* 16.4* 16.4*     --  See Reflexed IPF Result 143 159   MPV 11.2  --  NOT REPORTED 10.9 10.7   INR 1.0 1.0 1.0  --   --      Chemistry:  Recent Labs     204 20  0241 20  0551 20  1328 12/15/20  0600   *  --   --  135  --  134*   K 3.2*  --   --  4.0  --  4.2 CL 95*  --   --  101  --  103   CO2 21  --   --  24  --  21   GLUCOSE 148*  --   --  128*  --  102*   BUN 13  --   --  10  --  11   CREATININE 1.23*  --   --  0.98*  --  0.77   MG  --   --   --  1.8  --  2.1   ANIONGAP 15  --   --  10  --  10   LABGLOM 43*  --   --  55*  --  >60   GFRAA 52*  --   --  >60  --  >60   CALCIUM 8.6  --   --  8.4*  --  8.9   CAION  --   --   --  1.09*  --   --    PHOS  --   --   --  2.3*  --   --    TROPHS 47*   < > 57* 54* 36*  --    CKTOTAL 82  --   --   --   --   --    CKMB 1.1  --   --   --   --   --    MYOGLOBIN 74*  --  53 38 35  --    LACTACIDWB 3.0*  --   --   --   --   --     < > = values in this interval not displayed. Recent Labs     12/14/20  0551 12/16/20  0013   PROT 6.3*  --    LABALBU 3.2*  --    LABA1C 5.0  --    AST 28  --    ALT 19  --    ALKPHOS 115*  --    BILITOT 0.20*  --    CHOL 203*  --    HDL 43  --    LDLCHOLESTEROL 106  --    CHOLHDLRATIO 4.7  --    TRIG 268*  --    VLDL NOT REPORTED*  --    POCGLU  --  95     ABG:  Lab Results   Component Value Date    POCPH 7.437 05/30/2020    POCPCO2 42.3 05/30/2020    POCPO2 129.9 05/30/2020    POCHCO3 28.6 05/30/2020    NBEA NOT REPORTED 05/30/2020    PBEA 4 05/30/2020    FVP9VJS 30 05/30/2020    HNYE7RGI 99 05/30/2020    FIO2 40.0 05/30/2020     Lab Results   Component Value Date/Time    SPECIAL RAC 10ML 12/13/2020 10:30 PM     Lab Results   Component Value Date/Time    CULTURE NO GROWTH 3 DAYS 12/13/2020 10:30 PM       Radiology:  Ct Head Wo Contrast    Result Date: 12/13/2020  No acute intracranial abnormality. Mra Head Wo Contrast    Result Date: 12/14/2020  Unremarkable MRA of the head and neck without significant stenosis or evidence for occlusion. Xr Chest Portable    Result Date: 12/13/2020  No acute process. Mra Neck Wo Contrast    Result Date: 12/14/2020  Unremarkable MRA of the head and neck without significant stenosis or evidence for occlusion.      Mri Brain Wo Contrast    Result Date: 12/14/2020  Minimal chronic microvascular disease without acute intracranial abnormality. Physical Examination:        General appearance:  alert, cooperative and no distress  Mental Status:  oriented to person, place and time and normal affect  Lungs:  clear to auscultation bilaterally, normal effort  Heart:  regular rate and rhythm, no murmur, Holter monitor on  Abdomen: Obese, soft, nontender, nondistended, normal bowel sounds, no masses, hepatomegaly, splenomegaly  Extremities:  no edema, redness, tenderness in the calves  Skin:  no gross lesions, rashes, induration    Assessment:        Hospital Problems           Last Modified POA    * (Principal) Sepsis ([facility] [address] 12/14/2020 Yes    GERD (gastroesophageal reflux disease) 12/14/2020 Yes    Depression 12/14/2020 Yes    Anxiety 12/14/2020 Yes    BENEDICT (acute kidney injury) ([facility] [address] 12/14/2020 Yes    Hyperlipidemia 12/14/2020 Yes    Hypertension 12/14/2020 Yes    S/P Nissen fundoplication (without gastrostomy tube) procedure 12/14/2020 Yes    Expressive aphasia 12/14/2020 Yes          Plan:        1. Sepsis: Infectious disease following. Most likely secondary to UTI. 2. Klebsiella and ESBL UTI: Continue meropenem. Patient also started on doxycycline for right IV phlebitis/cellulitis. Plan is change position to Macrobid for 7 days at discharge  3. Expressive aphasia: neuro following, MRI normal, EEG showing left frontal slowing. Recommending 2 weeks Holter monitoring. Stable for discharge from their standpoint  4. Essential hypertension: Patient running hypotensive intermittently. Lasix on hold  5. Dyslipidemia: Statin therapy  6. Anxiety with depression: Stable continue home medications of amitriptyline, BuSpar and Ativan  7. GERD: Continue home dose of Protonix  8. BENEDICT: Resolved  9. GI/DVT prophylaxis: Continue Lovenox, Protonix  10.  Dispo: Home with p.MARGOTH Ochoa NP  12/16/2020  8:51 AM       Attending Supervising Physicians Attestation

## 2020-12-16 NOTE — PROGRESS NOTES
[facility] ([facility]) Neurology Specialist  [address]  [facility], [address] [facility]:  [phone] or [phone]  FAX:  [phone]            Brief history: Mony Mi is a 76 y.o. old female admitted on 12/13/2020 with transient speech aphasia     Subjective: No new neurological events overnight. Patient denies any new weakness, numbness, tingling or headache.      Objective: /70   Pulse 78   Temp 100.2 °F (37.9 °C) (Oral)   Resp 17   Ht 5' 3\" (1.6 m)   Wt 193 lb 5.5 oz (87.7 kg)   SpO2 96%   BMI 34.25 kg/m²       Medications:    doxycycline hyclate  100 mg Oral 2 times per day    lidocaine  1 patch Transdermal Daily    enoxaparin  40 mg Subcutaneous Daily    aspirin  81 mg Oral Daily    Or    aspirin  300 mg Rectal Daily    atorvastatin  40 mg Oral Nightly    sodium chloride flush  10 mL Intravenous 2 times per day    clonazePAM  0.5 mg Oral BID    pantoprazole  40 mg Oral QAM AC    [Held by provider] ferrous sulfate  325 mg Oral Daily with breakfast    amitriptyline  25 mg Oral Nightly    busPIRone  10 mg Oral TID    metoprolol succinate  25 mg Oral Daily    QUEtiapine  25 mg Oral BID    [Held by provider] furosemide  20 mg Oral Daily    docusate sodium  100 mg Oral Daily    sucralfate  1 g Oral 4 times per day    gabapentin  300 mg Oral TID    meropenem  1 g Intravenous Q8H    traZODone  100 mg Oral Nightly      General examination:    Head: Normocephalic, atraumatic  Eyes: Extraocular movements intact  Lungs: Respirations unlabored, chest wall no deformity  ENT: Normal external ear canals, no sinus tenderness  Heart: Regular rate rhythm  Abdomen: No masses, tenderness  Extremities: No cyanosis or edema, 2+ pulses  Skin: Intact, normal skin color    Neurological examination:    Mental status   Alert and oriented; intact memory with no confusion, speech or language problems; no hallucinations or delusions     Cranial nerves   II - visual fields intact to confrontation                                                III, IV, VI - extra-ocular muscles full: no pupillary defect; no VERONICA, no nystagmus, no ptosis   V - normal facial sensation                                                               VII - normal facial symmetry                                                             VIII - intact hearing                                                                             IX, X - symmetrical palate                                                                  XI - symmetrical shoulder shrug                                                       XII - midline tongue without atrophy or fasciculation     Motor function  Normal muscle bulk and tone; normal power 5/5, including fine motor movements     Sensory function Intact to touch, pin, vibration, proprioception     Cerebellar Intact fine motor movement. No involuntary movements or tremors     Reflex function Intact 2+ DTR and symmetric. Negative Babinski     Gait                  Normal station and gait         Lab Results   Component Value Date    LDLCHOLESTEROL 106 12/14/2020     No components found for: CHLPL  Lab Results   Component Value Date    TRIG 268 (H) 12/14/2020    TRIG 200 (H) 05/26/2020    TRIG 408 (H) 05/21/2020     Lab Results   Component Value Date    HDL 43 12/14/2020     No results found for: LDLCALC  No results found for: LABVLDL  Lab Results   Component Value Date    LABA1C 5.0 12/14/2020     Lab Results   Component Value Date    EAG 97 12/14/2020     No results found for: [id number]   Neurological work up:  CT head normal  CTA head and neck normal  MRI brain normal  2 D echo with EF 56%. Negative bubble study       Assessment Recommendations:  Transient episode of speech aphasia, likely left cortical TIA versus focal seizure with intact awareness    Patient neurologically back to baseline with no further episodes of speech aphasia.   Anticipate discharge in 2 weeks Holter monitoring and recommend continued antiplatelet medications for secondary stroke prophylaxis  Follow-up with neurology next 4 to 6 weeks  We will sign off. Please call with questions. Thank you for the consult.           This note is created with the assistance of a speech-recognition program. While intending to generate a document that actually reflects the content of the visit, the document can still have some errors including those of syntax and sound a- like substitutions which may escape proofreading. In such instances, actual meaning can be extrapolated by contextual derivation.

## 2020-12-16 NOTE — PROGRESS NOTES
[facility] -   Infectious diseases evaluation  admission date 12/13/2020    reason for consultation:   UTI with severe penicillin allergy    Impression :   Current:  · Urinary tract infection with severe penicillin allergy  · Expressive aphasia  · BENEDICT    Other:  ·   Discussion / summary of stay / plan of care   · Patient has severe allergy to penicillin but seems to have tolerated a dose of ceftriaxone in 2020 November, as well as meropenem twice in 2020. ·   Recommendations   · Start meropenem 1g q8   · Plan for 5-7 days according to symptoms  · Post 1 dose Levaquin - stopped because elevated QTc  · Urine cx + ESBL Klebsiella S meropenem  · Continue meropenem IV while inpatient  · Discharge on Macrobid for 7 days  · Give Doxycycline for 7 days for early R IV phlebitis/cellulitis  · Post void bladder scan - check if retaining urine  · Reconciled  · Recommend oupt oncology FU for the leukopenia looking for MDS     Infection Control Recommendations   · [place] Precautions    Antimicrobial Stewardship Recommendations   · Simplification of therapy  · Targeted therapy      Coordination ofOutpatient Care:   · Estimated Length of IV antimicrobials:  · Patient will need Midline / picc Catheter Insertion:   · Patient will need SNF:  · Patient will need outpatient wound care:     History of Present Illness:   Initial history:  Ayse Thakur is a 76y.o.-year-old female consulted by hospitalist because of severe allergy to penicillin and antibiotic management for UTI. Patient has a severe allergy to penicillin but seems to have tolerated a dose of ceftriaxone in 2020 November, as well as meropenem twice in 2020. Patient initially presented to the hospital for expressive aphasia onset 12/13 that has resolved at this time, she is unsure of duration of symptoms. Patient reports tactile fever. She reports suprapubic pain, dysuria, urgency, and frequency.  Unsure if she is retaining urine. She states she has history of recurrent UTI that usually present with dysuria. She denies shortness of breath and reports chronic cough with yellow sputum. White count has been normal 3.5. Patient has Tmax of 100.1    UA +small leukocyte esterase,   Urine micro: no epithelial cells, WBC 20-50, many bacteria  Urine cx pending      Interval changes  12/16/2020   Patient Vitals for the past 8 hrs:   BP Temp Temp src Pulse Resp SpO2 Weight   12/16/20 0849 -- -- -- -- -- 98 % --   12/16/20 0840 -- -- -- -- 19 98 % --   12/16/20 0800 119/68 100.2 °F (37.9 °C) Oral 72 15 100 % --   12/16/20 0530 -- -- -- -- -- -- 193 lb 5.5 oz (87.7 kg)   12/16/20 0400 (!) 95/54 97.7 °F (36.5 °C) Oral 69 17 95 % --     Patient seen and examined at bedside. Feeling better today, denies dysuria and abdominal pain resolving. In good spirits. Eating breakfast denies nausea or vomiting. She denies any rash or itchiness at this time. Afebrile. WBC  2.6    Urine cx 12/13 + ESBL Klebsiella S meropenem  Blood cultures remain negative    Summary of relevant labs:  Labs:  WBC 4.5 - 3.5 - 3.4 - 2.6  UA +small leukocyte esterase,   Urine micro: no epithelial cells, WBC 20-50, many bacteria  Lactate: 3 - 1.3 - 1.9  Creat 1.23 - 0.98  Procal 0.32    Micro:  Blood cx 12/13: no growth 2days x2  Urine cx 12/13 + ESBL Klebsiella S meropenem    Imaging:    I have personally reviewed the past medical history, past surgical history, medications, social history, and family history, and I haveupdated the database accordingly. Allergies:   Prednisone, Compazine [prochlorperazine maleate], Penicillin v potassium, and Penicillins     Review of Systems:     Review of Systems   Constitutional: Negative for chills and fever. HENT: Negative for congestion. Eyes: Negative for photophobia. Respiratory: Positive for cough (chronic). Negative for shortness of breath. Cardiovascular: Negative for chest pain.    Gastrointestinal: Positive for abdominal pain (suprapubic). Genitourinary: Negative for dysuria, frequency, hematuria and urgency. Musculoskeletal: Negative for arthralgias and back pain. Skin: Negative for color change. Neurological: Negative for dizziness and speech difficulty. Psychiatric/Behavioral: Negative for agitation. The patient is not nervous/anxious. Physical Examination :       Physical Exam  Constitutional:       General: She is not in acute distress. Appearance: Normal appearance. She is obese. She is not ill-appearing. HENT:      Head: Normocephalic and atraumatic. Nose: Nose normal.      Mouth/Throat:      Mouth: Mucous membranes are moist.   Eyes:      Conjunctiva/sclera: Conjunctivae normal.   Neck:      Musculoskeletal: Normal range of motion. Cardiovascular:      Rate and Rhythm: Normal rate and regular rhythm. Heart sounds: Normal heart sounds. Pulmonary:      Effort: Pulmonary effort is normal. No respiratory distress. Breath sounds: Normal breath sounds. Abdominal:      General: Bowel sounds are normal. There is no distension. Palpations: Abdomen is soft. Tenderness: There is abdominal tenderness. Genitourinary:     Comments: No chu, Pure wick  Skin:     General: Skin is warm and dry. Comments: R IV early phlebitis/cellulitis   Neurological:      General: No focal deficit present. Mental Status: She is alert and oriented to person, place, and time. Mental status is at baseline. Motor: No weakness.    Psychiatric:         Mood and Affect: Mood normal.         Past Medical History:     Past Medical History:   Diagnosis Date    Anxiety     Arthritis     Back pain     Bronchitis     Caffeine use     8 coffee / day    Carpal tunnel syndrome     Cataracts, bilateral     Constipation     Depression     Diarrhea     GERD (gastroesophageal reflux disease)     H/O gastric ulcer     Hyperlipidemia     Hypertension     Mumps     MVP (mitral valve prolapse)     Dr. Izabel Allen in May 2019    Recurrent UTI     Dr. Arron Menard    Sinusitis     Tracheostomy in place [facility] [place])     Ulcerative esophagitis     Wellness examination     Dr. Janet Manzano seen in Jan 2020       Past Surgical  History:     Past Surgical History:   Procedure Laterality Date    APPENDECTOMY     [address], LAPAROSCOPIC  05/22/2020     LAPAROSCOPIC ROBOTIC CHOLECYSTECTOMY, PYLOROPLASTY     CHOLECYSTECTOMY, LAPAROSCOPIC N/A 5/22/2020    XI LAPAROSCOPIC ROBOTIC CHOLECYSTECTOMY, PYLOROPLASTY performed by Jame Del Valle MD at [address] [facility]      EGD  3/17/2020         ERCP  05/21/2020    with stent insertion, balloon dilation, sphinctereotomy    ERCP  5/21/2020    ** CASE IN OR WITY GI STAFF** ERCP STENT INSERTION performed by Juno Sanchez MD at [facility]    ERCP  5/21/2020    ** CASE IN OR WITH GI STAFF**ERCP SPHINCTER/PAPILLOTOMY performed by Juno Sanchez MD at [facility]    ERCP  5/21/2020    ** CASE IN OR WITH GI STAFF**ERCP DILATION BALLOON performed by Juno Sanchez MD at [address]      patricia IOL    GASTRIC FUNDOPLICATION N/A 2/46/0595    XI LAPAROSCOPIC ROBOTIC HIATAL HERNIA REPAIR, CHERYL FUNDOPLICATION performed by Jame Del Valle MD at [facility] N/A 3/27/2020    EGD PEG TUBE PLACEMENT performed by Jame Del Valle MD at [address] [facility] CATH POWER PICC TRIPLE  3/4/2020         HYSTERECTOMY      Abdominal    JOINT REPLACEMENT Right     right hip    OVARY REMOVAL      RHINOPLASTY      TONSILLECTOMY      TOTAL HIP ARTHROPLASTY      TOTAL NEPHRECTOMY      atrophic from infections, age 13   [facility] TRACHEOSTOMY N/A 3/27/2020    **ADD ON **WANTS 10:00AM **TRACHEOTOMY performed by Jame Del Valle MD at [address] [facility] 4/2/2020    TRACHEOTOMY EXCHANGE performed by Jame Del Valle MD at [address] [facility] GASTROINTESTINAL ENDOSCOPY N/A 3/17/2020    BEDSIDE EGD ESOPHAGOGASTRODUODENOSCOPY (ICU) performed by Quynh Alvarez MD at [facility]    UPPER GASTROINTESTINAL ENDOSCOPY N/A 5/18/2020    EGD BIOPSY performed by Rubia Estrada MD at [address] [place] [address]  5/18/2020    EGD DILATION BALLOON performed by Rubia Estrada MD at [address] [place] [address] N/A 7/6/2020    ** CASE IN O.R. WITH GI STAFF** EGD ESOPHAGOGASTRODUODENOSCOPY performed by Shelia Aguilera MD at [address] [place] [address] N/A 11/9/2020    EGD DIAGNOSTIC ONLY performed by Roger Moralez MD at [facility]       Medications:      doxycycline hyclate  100 mg Oral 2 times per day    lidocaine  1 patch Transdermal Daily    enoxaparin  40 mg Subcutaneous Daily    aspirin  81 mg Oral Daily    Or    aspirin  300 mg Rectal Daily    atorvastatin  40 mg Oral Nightly    sodium chloride flush  10 mL Intravenous 2 times per day    clonazePAM  0.5 mg Oral BID    pantoprazole  40 mg Oral QAM AC    [Held by provider] ferrous sulfate  325 mg Oral Daily with breakfast    amitriptyline  25 mg Oral Nightly    busPIRone  10 mg Oral TID    metoprolol succinate  25 mg Oral Daily    QUEtiapine  25 mg Oral BID    [Held by provider] furosemide  20 mg Oral Daily    docusate sodium  100 mg Oral Daily    sucralfate  1 g Oral 4 times per day    gabapentin  300 mg Oral TID    meropenem  1 g Intravenous Q8H    traZODone  100 mg Oral Nightly       Social History:     Social History     Socioeconomic History    Marital status:       Spouse name: Not on file    Number of children: 2    Years of education: Not on file    Highest education level: Not on file   Occupational History    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   Social Needs    Financial resource strain: Not on file    Food insecurity     Worry: Not on file     Inability: Not on file    Transportation needs     Medical: Not on file     Non-medical: Not on file   Tobacco Use    Smoking status: Former Smoker     Packs/day: 1.00     Years: 50.00     Pack years: 50.00     Types: Cigarettes    Smokeless tobacco: Never Used    Tobacco comment: quit 1 year ago    Substance and Sexual Activity    Alcohol use: No    Drug use: No    Sexual activity: Never   Lifestyle    Physical activity     Days per week: Not on file     Minutes per session: Not on file    Stress: Not on file   Relationships    Social connections     Talks on phone: Not on file     Gets together: Not on file     Attends Yazidi service: Not on file     Active member of club or organization: Not on file     Attends meetings of clubs or organizations: Not on file     Relationship status: Not on file    Intimate partner violence     Fear of current or ex partner: Not on file     Emotionally abused: Not on file     Physically abused: Not on file     Forced sexual activity: Not on file   Other Topics Concern    Not on file   Social History Narrative    Not on file       Family History:     Family History   Problem Relation Age of Onset    Cancer Mother         uterine    Heart Attack Mother     Heart Attack Father     Other Maternal Grandfather         foot gangrene    Other Paternal Grandmother         bleeding ulcers    Other Paternal Grandfather         lung cancer      Medical Decision Making:   I have independently reviewed/ordered the following labs:    CBC with Differential:   Recent Labs     12/15/20  0600 12/16/20  0615   WBC 3.4* 2.6*   HGB 10.7* 10.0*   HCT 34.7* 34.7*    159   LYMPHOPCT 27 20*   MONOPCT 6 12*     BMP:  Recent Labs     12/14/20  0551 12/15/20  0600    134*   K 4.0 4.2    103   CO2 24 21   BUN 10 11   CREATININE 0.98* 0.77   MG 1.8 2.1     Hepatic Function Panel:   Recent Labs     12/14/20  0551   PROT 6.3*   LABALBU 3.2*   BILITOT 0.20*   ALKPHOS 115*   ALT 19   AST 28     No results for input(s): RPR in the last 72 hours. No results for input(s): HIV in the last 72 hours. No results for input(s): BC in the last 72 hours. Lab Results   Component Value Date    CREATININE 0.77 12/15/2020    GLUCOSE 102 12/15/2020       Detailed results: Thank you for allowing us to participate in the care of this patient. Please call with questions. This note is created with the assistance of a speech recognition program.  While intending to generate adocument that actually reflects the content of the visit, the document can still have some errors including those of syntax and sound a like substitutions which may escape proof reading. It such instances, actual meaningcan be extrapolated by contextual diversion. [address] [place] [address]  Office: [phone]  Perfect serve / office [phone]        I have discussed the care of the patient, including pertinent history and exam findings,  with the student. I have seen and examined the patient and the key elements of all parts of the encounter have been performed by me.   I agree with the assessment, plan and orders as documented by the student    Philly Mcneil, Infectious Diseases

## 2020-12-16 NOTE — PLAN OF CARE
Problem: Falls - Risk of:  Goal: Will remain free from falls  Description: Will remain free from falls  Outcome: Met This Shift     Problem: Falls - Risk of:  Goal: Absence of physical injury  Description: Absence of physical injury  Outcome: Met This Shift     Problem: Skin Integrity:  Goal: Absence of new skin breakdown  Description: Absence of new skin breakdown  Outcome: Met This Shift     Problem: Pain:  Goal: Pain level will decrease  Description: Pain level will decrease  Outcome: Ongoing     Problem: Pain:  Goal: Control of acute pain  Description: Control of acute pain  Outcome: Ongoing     Problem: Pain:  Goal: Control of chronic pain  Description: Control of chronic pain  Outcome: Ongoing     Problem: HEMODYNAMIC STATUS  Goal: Patient has stable vital signs and fluid balance  Outcome: Ongoing

## 2020-12-16 NOTE — DISCHARGE SUMMARY
[facility]  Office: [address], DO, Alia Adair, DO, Maya Alcala, DO, Billie Jensen, DO, Monica Harvey MD, Amy Buck MD, Nichole Robertson MD, Leda Valerio MD, Dora Rondon MD, Court Fernandez MD, Jeri Herman MD, Dipti Malone MD, Kvng Pedersen MD, Dileep Back DO, Alicia Marcial MD, Zack Carrillo MD, Rolando Maza DO, Jj Varela MD,  Sanjeev Chung, DO, Marii Walter MD, Rakan Fernández MD, [facility], [facility], [facility], Sally Fuentes, CNP, Chetan Bob, CNS, Molina Tim, CNP, Bib Auguste, CNP, Nima Astudillo, CNP, Dionicia Frankel, [facility], Mariana Leon, CNP, Joelle Dolan PA-C, Roberto Amaya, [facility], Kathie August, CNP, Elmer Mccormick, CNP, Pradeep Damon, CNP, Rad Ledezma, [facility], Nikki Benders, [address] [place] [address]    Discharge Summary     Patient ID: Irma Monson  :  1945   MRN: [id number]     ACCOUNT:  [de-identified]   Patient's PCP: Marcus Willingham MD  Admit Date: 2020   Discharge Date: 2020   Length of Stay: 3  Code Status:  Full Code  Admitting Physician: Jessica Del Rio DO  Discharge Physician: MARGOTH Hatfield NP     Active Discharge Diagnoses:     Hospital Problem Lists:  Principal Problem (Resolved):    Sepsis [facility])  Active Problems:    GERD (gastroesophageal reflux disease)    Depression    Anxiety    Hyperlipidemia    Hypertension    S/P Nissen fundoplication (without gastrostomy tube) procedure    Expressive aphasia  Resolved Problems:    BENEDICT (acute kidney injury) [facility])      Admission Condition:  fair     Discharged Condition: good    Hospital Stay:     Hospital Course:  Irma Monson is a 76 y.o. female who was admitted for the management of   Sepsis ([facility] [address] , presented to ER with Aphasia    This is a 68-year-old female who presented with concerns for urinary tract infection.  Patient does have a history of resistant UTI. Alonzo Smith was evaluated by infectious disease who started her on meropenem.  Patient was at for sepsis.  She did develop some hypotension which improved with IV fluids.  Otherwise, patient noted to have expressive aphasia which is currently being worked up with neurology. Significant therapeutic interventions:     1. Sepsis: Infectious disease following. Most likely secondary to UTI. 2. Klebsiella and ESBL UTI: Continue meropenem. Patient also started on doxycycline for right IV phlebitis/cellulitis. Plan is change position to Macrobid for 7 days at discharge  3. Expressive aphasia: neuro following, MRI normal, EEG showing left frontal slowing. Recommending 2 weeks Holter monitoring. Stable for discharge from their standpoint  4. Essential hypertension: Patient running hypotensive intermittently. Lasix on hold  5. Dyslipidemia: Statin therapy  6. Anxiety with depression: Stable continue home medications of amitriptyline, BuSpar and Ativan  7. GERD: Continue home dose of Protonix  8. BENEDICT: Resolved  9. GI/DVT prophylaxis: Continue Lovenox, Protonix  10.  Dispo: Home with p.o. doxy    Significant Diagnostic Studies:   Labs / Micro:  CBC:   Lab Results   Component Value Date    WBC 2.6 12/16/2020    RBC 3.66 12/16/2020    HGB 10.0 12/16/2020    HCT 34.7 12/16/2020    MCV 94.8 12/16/2020    MCH 27.3 12/16/2020    MCHC 28.8 12/16/2020    RDW 16.4 12/16/2020     12/16/2020     BMP:    Lab Results   Component Value Date    GLUCOSE 102 12/15/2020     12/15/2020    K 4.2 12/15/2020     12/15/2020    CO2 21 12/15/2020    ANIONGAP 10 12/15/2020    BUN 11 12/15/2020    CREATININE 0.77 12/15/2020    BUNCRER NOT REPORTED 12/15/2020    CALCIUM 8.9 12/15/2020    LABGLOM >60 12/15/2020    GFRAA >60 12/15/2020    GFR      12/15/2020    GFR NOT REPORTED 12/15/2020     HFP:    Lab Results   Component Value Date    PROT 6.3 12/14/2020     CMP:    Lab Results   Component Value Date    GLUCOSE 102 12/15/2020     12/15/2020    K 4.2 12/15/2020     12/15/2020    CO2 21 12/15/2020    BUN 11 12/15/2020    CREATININE 0.77 12/15/2020    ANIONGAP 10 12/15/2020    ALKPHOS 115 12/14/2020    ALT 19 12/14/2020    AST 28 12/14/2020    BILITOT 0.20 12/14/2020    LABALBU 3.2 12/14/2020    ALBUMIN 1.0 12/14/2020    LABGLOM >60 12/15/2020    GFRAA >60 12/15/2020    GFR      12/15/2020    GFR NOT REPORTED 12/15/2020    PROT 6.3 12/14/2020    CALCIUM 8.9 12/15/2020     PT/INR:    Lab Results   Component Value Date    PROTIME 10.5 12/14/2020    INR 1.0 12/14/2020     PTT:   Lab Results   Component Value Date    APTT 27.4 12/13/2020     FLP:    Lab Results   Component Value Date    CHOL 203 12/14/2020    TRIG 268 12/14/2020    HDL 43 12/14/2020     U/A:    Lab Results   Component Value Date    COLORU YELLOW 12/13/2020    TURBIDITY CLEAR 12/13/2020    SPECGRAV 1.014 12/13/2020    HGBUR MODERATE 12/13/2020    PHUR 6.0 12/13/2020    PROTEINU TRACE 12/13/2020    GLUCOSEU NEGATIVE 12/13/2020    KETUA NEGATIVE 12/13/2020    BILIRUBINUR NEGATIVE 12/13/2020    BILIRUBINUR Negative 06/29/2018    UROBILINOGEN Normal 12/13/2020    NITRU NEGATIVE 12/13/2020    LEUKOCYTESUR SMALL 12/13/2020     TSH:  No results found for: TSH     Radiology:  Ct Head Wo Contrast    Result Date: 12/13/2020  No acute intracranial abnormality. Mra Head Wo Contrast    Result Date: 12/14/2020  Unremarkable MRA of the head and neck without significant stenosis or evidence for occlusion. Xr Chest Portable    Result Date: 12/13/2020  No acute process. Mra Neck Wo Contrast    Result Date: 12/14/2020  Unremarkable MRA of the head and neck without significant stenosis or evidence for occlusion. Mri Brain Wo Contrast    Result Date: 12/14/2020  Minimal chronic microvascular disease without acute intracranial abnormality.        Consultations:    Consults:     Final Specialist Recommendations/Findings:   IP CONSULT TO NEUROLOGY  IP CONSULT TO HOSPITALIST      The patient was seen and examined on day of discharge and this discharge summary is in conjunction with any daily progress note from day of discharge.     Discharge plan:     Disposition: skilled nursing facility     Physician Follow Up:     [facility] [address] [facility] [address] [address] [address]  [address], [address] [address] [address]  [phone]    In 1 week  for follow up after hospitalization    Bella Torres MD  [address]  [phone]    Call in 1 week  for follow up after hospitalization       Requiring Further Evaluation/Follow Up POST HOSPITALIZATION/Incidental Findings: holter monitor for 2 weeks, follow up with neurology    Diet: regular diet and cardiac diet    Activity: As tolerated    Instructions to Patient: see attached     Discharge Medications:      Medication List      START taking these medications    doxycycline hyclate 100 MG tablet  Commonly known as: VIBRA-TABS  Take 1 tablet by mouth every 12 hours for 7 days     ferrous sulfate 325 (65 Fe) MG EC tablet  Commonly known as: FE TABS 325  Take 1 tablet by mouth daily (with breakfast)  Replaces: ferrous sulfate 325 (65 Fe) MG tablet     nitrofurantoin (macrocrystal-monohydrate) 100 MG capsule  Commonly known as: MACROBID  Take 1 capsule by mouth 2 times daily for 7 days        CHANGE how you take these medications    QUEtiapine 25 MG tablet  Commonly known as: SEROQUEL  Take 1 tablet by mouth 2 times daily  What changed:   · medication strength  · how much to take  · when to take this        CONTINUE taking these medications    Acetaminophen 500 MG Caps     amitriptyline 25 MG tablet  Commonly known as: ELAVIL  Take 1 tablet by mouth nightly     busPIRone 10 MG tablet  Commonly known as: BUSPAR  Take 2 tablets by mouth 3 times daily     calcium carbonate 500 MG chewable tablet  Commonly known as: TUMS     clonazePAM 0.5 MG tablet  Commonly known as: KLONOPIN  Take 0.5 tablets by mouth 2 times daily for 60 days.  Taking 1/2 tablet BID     docusate 50 MG/5ML liquid  Commonly known as: COLACE  10 mLs by Per G Tube route 2 times daily     furosemide 20 MG tablet  Commonly known as: LASIX  Take 1 tablet by mouth daily     gabapentin 300 MG capsule  Commonly known as: NEURONTIN     metoprolol succinate 25 MG extended release tablet  Commonly known as: TOPROL XL  Take 1 tablet by mouth daily     Oyster Shell 500 MG Tabs     pantoprazole 40 MG tablet  Commonly known as: PROTONIX  Take 1 tablet by mouth 2 times daily     RA Vitamin D-3 50 MCG (2000 UT) Caps  Generic drug: Cholecalciferol     simvastatin 40 MG tablet  Commonly known as: ZOCOR     sucralfate 1 GM tablet  Commonly known as: CARAFATE  Take 1 tablet by mouth 4 times daily     traZODone 100 MG tablet  Commonly known as: DESYREL  Take 1 tablet by mouth nightly        STOP taking these medications    benzocaine 10 % mucosal gel  Commonly known as: ORAJEL     escitalopram 20 MG tablet  Commonly known as: LEXAPRO     ferrous sulfate 325 (65 Fe) MG tablet  Commonly known as: IRON 325  Replaced by: ferrous sulfate 325 (65 Fe) MG EC tablet     metoclopramide 5 MG tablet  Commonly known as: REGLAN     midodrine 5 MG tablet  Commonly known as: PROAMATINE           Where to Get Your Medications      You can get these medications from any pharmacy    Bring a paper prescription for each of these medications  · ferrous sulfate 325 (65 Fe) MG EC tablet  · furosemide 20 MG tablet  · QUEtiapine 25 MG tablet     Information about where to get these medications is not yet available    Ask your nurse or doctor about these medications  · doxycycline hyclate 100 MG tablet  · nitrofurantoin (macrocrystal-monohydrate) 100 MG capsule         Discharge Procedure Orders   Mg Simmons MD, [facility]ematology[facility], [facility]   Referral Priority: Routine Referral Type: Eval and Treat   Referral Reason: Specialty Services Required   Referred to Provider: Rikki Ormond Requested Specialty: Oncology   Number of Visits Requested: 1       Time Spent on discharge is  36 mins in patient examination, evaluation, counseling as well as medication reconciliation, prescriptions for required medications, discharge plan and follow up. Discussed with attending  Electronically signed by   MARGOTH Cox NP  12/16/2020  2:15 PM      Thank you Dr. Ondina Castro MD for the opportunity to be involved in this patient's care.

## 2021-01-04 NOTE — TELEPHONE ENCOUNTER
Writer attempted to contact pt over the phone, but there was no answer & we were unable to leave a  msg due to pt's voice mailbox is full. Writer attempted to contact [facility] x 2, but phone rang busy.

## 2021-01-06 NOTE — PROCEDURES
[facility] [place] [facility]                                 EVENT MONITOR    PATIENT NAME: Aissatou Osborne                     :        1945  MED REC NO:   [id number]                             ROOM:       2111  ACCOUNT NO:   [de-identified]                           ADMIT DATE: 2020  PROVIDER:     Amandeep Brothers    TEST TYPE:  2 week Event Monitor (334:10 hrs analyzed)  on 2020    AUTHORIZING PROVIDER:  Sirisha Lubin MD  PRIMARY CARE PROVIDER:  Pineda Lucio MD  INTERPRETING PHYSICIAN:  Wilfredo Newman MD    COMMENTS:  The patient appeared to remain in sinus rhythm throughout recording with  sinus bradycardia and sinus tachycardia noted. Rare PAC's were noted. Rare PVC's were noted. 1.  Sinus rhythm with sinus bradycardia and sinus tachycardia  2. Rare premature atrial contractions  3.   Rare premature ventricular contractions      ()    Payal Treadwell    D: 2021 12:19:21       T: 2021 12:20:41     MT/NANCY  Job#: [id number]     Doc#: Unknown    CC:    (

## 2021-01-14 NOTE — CARE COORDINATION
Discharge [facility] Case Management Department  Written by: Abilio Mir RN    Patient Name: Nahum Simms  Attending Provider: No att. providers found  Admit Date: 2020  4:32 AM  MRN: [id number]  Account: [de-identified]                     : 1945  Discharge Date: 11/10/2020      Disposition: Abhijit in [address] [place] [address], RN Please arrange for a home blood draw for these tests patient is immobile  because of a pelvic fracture

## 2021-01-19 NOTE — TELEPHONE ENCOUNTER
WRITER CALLED PT TO SCHEDULE A CONSULTATION APPT, PT IS UNAWARE OF WHY SHE NEEDS TO COME AND IS REFUSING TO COME. SENDING REFERRAL BACK PLEASE ADVISE.

## 2021-01-20 NOTE — TELEPHONE ENCOUNTER
Writer spoke to Raúl from nursing facility and informed her we need to resched pt's EGD/ERCP proc sched at [facility] on 2/12/21 due to provider will not be here on that day. Pt is resched to [facility] 2/10/21 @ 11:45am proc time, 10:15am arrival time. New prep instructions w/ new dates will be faxed to nursing facility. Pt's covid testing will be done at the nursing facility on 2/5/21 due to covid testing not done on the weekends due to it gets missed to limited staffing. Raúl voices her understanding.

## 2021-02-05 ENCOUNTER — TELEPHONE (OUTPATIENT)
Dept: GASTROENTEROLOGY | Age: 76
End: 2021-02-05

## 2021-02-05 NOTE — TELEPHONE ENCOUNTER
Writer attempted to contact pt by phone to confirm 2/10/21 proc at [facility] w/ arrival time of 10:15am, but there was no answer & we were unable to leave msg d/t vm is full.

## 2021-02-07 ENCOUNTER — APPOINTMENT (OUTPATIENT)
Dept: GENERAL RADIOLOGY | Age: 76
DRG: 377 | End: 2021-02-07
Payer: MEDICARE

## 2021-02-07 ENCOUNTER — HOSPITAL ENCOUNTER (INPATIENT)
Age: 76
LOS: 3 days | Discharge: NURSING, REHAB OR LONG-TERM CARE | DRG: 377 | End: 2021-02-10
Attending: EMERGENCY MEDICINE | Admitting: INTERNAL MEDICINE
Payer: MEDICARE

## 2021-02-07 DIAGNOSIS — F41.9 ANXIETY: ICD-10-CM

## 2021-02-07 DIAGNOSIS — Z93.1 S/P PERCUTANEOUS ENDOSCOPIC GASTROSTOMY (PEG) TUBE PLACEMENT (HCC): ICD-10-CM

## 2021-02-07 DIAGNOSIS — R10.13 ABDOMINAL PAIN, EPIGASTRIC: ICD-10-CM

## 2021-02-07 DIAGNOSIS — K92.0 COFFEE GROUND EMESIS: ICD-10-CM

## 2021-02-07 DIAGNOSIS — K22.10 ULCERATIVE ESOPHAGITIS: ICD-10-CM

## 2021-02-07 DIAGNOSIS — R77.8 ELEVATED TROPONIN: Primary | ICD-10-CM

## 2021-02-07 PROBLEM — N30.00 ACUTE CYSTITIS WITHOUT HEMATURIA: Status: ACTIVE | Noted: 2021-02-07

## 2021-02-07 LAB
-: ABNORMAL
ABSOLUTE EOS #: 0.1 K/UL (ref 0–0.44)
ABSOLUTE IMMATURE GRANULOCYTE: 0.19 K/UL (ref 0–0.3)
ABSOLUTE LYMPH #: 1.11 K/UL (ref 1.1–3.7)
ABSOLUTE MONO #: 0.92 K/UL (ref 0.1–1.2)
ALBUMIN SERPL-MCNC: 3.5 G/DL (ref 3.5–5.2)
ALBUMIN/GLOBULIN RATIO: 1.1 (ref 1–2.5)
ALLEN TEST: ABNORMAL
ALP BLD-CCNC: 130 U/L (ref 35–104)
ALT SERPL-CCNC: 10 U/L (ref 5–33)
AMORPHOUS: ABNORMAL
ANION GAP SERPL CALCULATED.3IONS-SCNC: 13 MMOL/L (ref 9–17)
ANION GAP: 9 MMOL/L (ref 7–16)
AST SERPL-CCNC: 10 U/L
BACTERIA: ABNORMAL
BASOPHILS # BLD: 0 % (ref 0–2)
BASOPHILS ABSOLUTE: 0.04 K/UL (ref 0–0.2)
BILIRUB SERPL-MCNC: 0.24 MG/DL (ref 0.3–1.2)
BILIRUBIN URINE: NEGATIVE
BNP INTERPRETATION: NORMAL
BUN BLDV-MCNC: 30 MG/DL (ref 8–23)
BUN/CREAT BLD: ABNORMAL (ref 9–20)
CALCIUM SERPL-MCNC: 9.1 MG/DL (ref 8.6–10.4)
CASTS UA: ABNORMAL /LPF (ref 0–8)
CHLORIDE BLD-SCNC: 100 MMOL/L (ref 98–107)
CO2: 23 MMOL/L (ref 20–31)
COLOR: YELLOW
CREAT SERPL-MCNC: 1.09 MG/DL (ref 0.5–0.9)
CRYSTALS, UA: ABNORMAL /HPF
DIFFERENTIAL TYPE: ABNORMAL
EOSINOPHILS RELATIVE PERCENT: 1 % (ref 1–4)
EPITHELIAL CELLS UA: ABNORMAL /HPF (ref 0–5)
FIO2: ABNORMAL
GFR AFRICAN AMERICAN: 59 ML/MIN
GFR NON-AFRICAN AMERICAN: 46 ML/MIN
GFR NON-AFRICAN AMERICAN: 49 ML/MIN
GFR SERPL CREATININE-BSD FRML MDRD: 56 ML/MIN
GFR SERPL CREATININE-BSD FRML MDRD: ABNORMAL ML/MIN/{1.73_M2}
GLUCOSE BLD-MCNC: 108 MG/DL (ref 70–99)
GLUCOSE BLD-MCNC: 112 MG/DL (ref 74–100)
GLUCOSE URINE: NEGATIVE
HCO3 VENOUS: 25.3 MMOL/L (ref 22–29)
HCT VFR BLD CALC: 37.3 % (ref 36.3–47.1)
HEMOGLOBIN: 11.5 G/DL (ref 11.9–15.1)
IMMATURE GRANULOCYTES: 2 %
KETONES, URINE: NEGATIVE
LACTIC ACID, SEPSIS WHOLE BLOOD: 1.7 MMOL/L (ref 0.5–1.9)
LACTIC ACID, SEPSIS: NORMAL MMOL/L (ref 0.5–1.9)
LEUKOCYTE ESTERASE, URINE: ABNORMAL
LIPASE: 8 U/L (ref 13–60)
LYMPHOCYTES # BLD: 9 % (ref 24–43)
MCH RBC QN AUTO: 26.8 PG (ref 25.2–33.5)
MCHC RBC AUTO-ENTMCNC: 30.8 G/DL (ref 28.4–34.8)
MCV RBC AUTO: 86.9 FL (ref 82.6–102.9)
MODE: ABNORMAL
MONOCYTES # BLD: 8 % (ref 3–12)
MUCUS: ABNORMAL
NEGATIVE BASE EXCESS, VEN: ABNORMAL (ref 0–2)
NITRITE, URINE: POSITIVE
NRBC AUTOMATED: 0 PER 100 WBC
O2 DEVICE/FLOW/%: ABNORMAL
O2 SAT, VEN: 85 % (ref 60–85)
OTHER OBSERVATIONS UA: ABNORMAL
PATIENT TEMP: ABNORMAL
PCO2, VEN: 32.6 MM HG (ref 41–51)
PDW BLD-RTO: 15.9 % (ref 11.8–14.4)
PH UA: 8 (ref 5–8)
PH VENOUS: 7.5 (ref 7.32–7.43)
PLATELET # BLD: 235 K/UL (ref 138–453)
PLATELET ESTIMATE: ABNORMAL
PMV BLD AUTO: 10.6 FL (ref 8.1–13.5)
PO2, VEN: 44.4 MM HG (ref 30–50)
POC CHLORIDE: 103 MMOL/L (ref 98–107)
POC CREATININE: 1.15 MG/DL (ref 0.51–1.19)
POC HEMATOCRIT: 35 % (ref 36–46)
POC HEMOGLOBIN: 11.8 G/DL (ref 12–16)
POC IONIZED CALCIUM: 1.12 MMOL/L (ref 1.15–1.33)
POC LACTIC ACID: 1.37 MMOL/L (ref 0.56–1.39)
POC PCO2 TEMP: ABNORMAL MM HG
POC PH TEMP: ABNORMAL
POC PO2 TEMP: ABNORMAL MM HG
POC POTASSIUM: 3.6 MMOL/L (ref 3.5–4.5)
POC SODIUM: 137 MMOL/L (ref 138–146)
POSITIVE BASE EXCESS, VEN: 2 (ref 0–3)
POTASSIUM SERPL-SCNC: 3.6 MMOL/L (ref 3.7–5.3)
PRO-BNP: 149 PG/ML
PROTEIN UA: ABNORMAL
RBC # BLD: 4.29 M/UL (ref 3.95–5.11)
RBC # BLD: ABNORMAL 10*6/UL
RBC UA: ABNORMAL /HPF (ref 0–4)
RENAL EPITHELIAL, UA: ABNORMAL /HPF
SAMPLE SITE: ABNORMAL
SEG NEUTROPHILS: 80 % (ref 36–65)
SEGMENTED NEUTROPHILS ABSOLUTE COUNT: 9.98 K/UL (ref 1.5–8.1)
SODIUM BLD-SCNC: 136 MMOL/L (ref 135–144)
SPECIFIC GRAVITY UA: 1.02 (ref 1–1.03)
TOTAL CO2, VENOUS: 26 MMOL/L (ref 23–30)
TOTAL PROTEIN: 6.7 G/DL (ref 6.4–8.3)
TRICHOMONAS: ABNORMAL
TROPONIN INTERP: ABNORMAL
TROPONIN INTERP: ABNORMAL
TROPONIN T: ABNORMAL NG/ML
TROPONIN T: ABNORMAL NG/ML
TROPONIN, HIGH SENSITIVITY: 45 NG/L (ref 0–14)
TROPONIN, HIGH SENSITIVITY: 49 NG/L (ref 0–14)
TURBIDITY: CLEAR
URINE HGB: NEGATIVE
UROBILINOGEN, URINE: NORMAL
WBC # BLD: 12.3 K/UL (ref 3.5–11.3)
WBC # BLD: ABNORMAL 10*3/UL
WBC UA: ABNORMAL /HPF (ref 0–5)
YEAST: ABNORMAL

## 2021-02-07 PROCEDURE — 6370000000 HC RX 637 (ALT 250 FOR IP): Performed by: STUDENT IN AN ORGANIZED HEALTH CARE EDUCATION/TRAINING PROGRAM

## 2021-02-07 PROCEDURE — 84295 ASSAY OF SERUM SODIUM: CPT

## 2021-02-07 PROCEDURE — 83690 ASSAY OF LIPASE: CPT

## 2021-02-07 PROCEDURE — 6360000002 HC RX W HCPCS: Performed by: STUDENT IN AN ORGANIZED HEALTH CARE EDUCATION/TRAINING PROGRAM

## 2021-02-07 PROCEDURE — C9113 INJ PANTOPRAZOLE SODIUM, VIA: HCPCS | Performed by: STUDENT IN AN ORGANIZED HEALTH CARE EDUCATION/TRAINING PROGRAM

## 2021-02-07 PROCEDURE — 2580000003 HC RX 258: Performed by: STUDENT IN AN ORGANIZED HEALTH CARE EDUCATION/TRAINING PROGRAM

## 2021-02-07 PROCEDURE — 94761 N-INVAS EAR/PLS OXIMETRY MLT: CPT

## 2021-02-07 PROCEDURE — 96375 TX/PRO/DX INJ NEW DRUG ADDON: CPT

## 2021-02-07 PROCEDURE — 82330 ASSAY OF CALCIUM: CPT

## 2021-02-07 PROCEDURE — 74018 RADEX ABDOMEN 1 VIEW: CPT

## 2021-02-07 PROCEDURE — 2060000000 HC ICU INTERMEDIATE R&B

## 2021-02-07 PROCEDURE — 85014 HEMATOCRIT: CPT

## 2021-02-07 PROCEDURE — 87186 SC STD MICRODIL/AGAR DIL: CPT

## 2021-02-07 PROCEDURE — 51701 INSERT BLADDER CATHETER: CPT

## 2021-02-07 PROCEDURE — 83880 ASSAY OF NATRIURETIC PEPTIDE: CPT

## 2021-02-07 PROCEDURE — 96374 THER/PROPH/DIAG INJ IV PUSH: CPT

## 2021-02-07 PROCEDURE — 71045 X-RAY EXAM CHEST 1 VIEW: CPT

## 2021-02-07 PROCEDURE — 84132 ASSAY OF SERUM POTASSIUM: CPT

## 2021-02-07 PROCEDURE — 83605 ASSAY OF LACTIC ACID: CPT

## 2021-02-07 PROCEDURE — U0005 INFEC AGEN DETEC AMPLI PROBE: HCPCS

## 2021-02-07 PROCEDURE — 80053 COMPREHEN METABOLIC PANEL: CPT

## 2021-02-07 PROCEDURE — 99222 1ST HOSP IP/OBS MODERATE 55: CPT | Performed by: INTERNAL MEDICINE

## 2021-02-07 PROCEDURE — 82947 ASSAY GLUCOSE BLOOD QUANT: CPT

## 2021-02-07 PROCEDURE — 81001 URINALYSIS AUTO W/SCOPE: CPT

## 2021-02-07 PROCEDURE — 87077 CULTURE AEROBIC IDENTIFY: CPT

## 2021-02-07 PROCEDURE — 87086 URINE CULTURE/COLONY COUNT: CPT

## 2021-02-07 PROCEDURE — 93005 ELECTROCARDIOGRAM TRACING: CPT | Performed by: STUDENT IN AN ORGANIZED HEALTH CARE EDUCATION/TRAINING PROGRAM

## 2021-02-07 PROCEDURE — 2500000003 HC RX 250 WO HCPCS: Performed by: STUDENT IN AN ORGANIZED HEALTH CARE EDUCATION/TRAINING PROGRAM

## 2021-02-07 PROCEDURE — 85025 COMPLETE CBC W/AUTO DIFF WBC: CPT

## 2021-02-07 PROCEDURE — 82565 ASSAY OF CREATININE: CPT

## 2021-02-07 PROCEDURE — 99223 1ST HOSP IP/OBS HIGH 75: CPT | Performed by: INTERNAL MEDICINE

## 2021-02-07 PROCEDURE — 99285 EMERGENCY DEPT VISIT HI MDM: CPT

## 2021-02-07 PROCEDURE — 82803 BLOOD GASES ANY COMBINATION: CPT

## 2021-02-07 PROCEDURE — 82435 ASSAY OF BLOOD CHLORIDE: CPT

## 2021-02-07 PROCEDURE — 84484 ASSAY OF TROPONIN QUANT: CPT

## 2021-02-07 PROCEDURE — U0003 INFECTIOUS AGENT DETECTION BY NUCLEIC ACID (DNA OR RNA); SEVERE ACUTE RESPIRATORY SYNDROME CORONAVIRUS 2 (SARS-COV-2) (CORONAVIRUS DISEASE [COVID-19]), AMPLIFIED PROBE TECHNIQUE, MAKING USE OF HIGH THROUGHPUT TECHNOLOGIES AS DESCRIBED BY CMS-2020-01-R: HCPCS

## 2021-02-07 RX ORDER — ACETAMINOPHEN 325 MG/1
650 TABLET ORAL EVERY 4 HOURS PRN
Status: CANCELLED | OUTPATIENT
Start: 2021-02-07

## 2021-02-07 RX ORDER — ONDANSETRON 2 MG/ML
4 INJECTION INTRAMUSCULAR; INTRAVENOUS ONCE
Status: DISCONTINUED | OUTPATIENT
Start: 2021-02-07 | End: 2021-02-07

## 2021-02-07 RX ORDER — CALCIUM CARBONATE 200(500)MG
500 TABLET,CHEWABLE ORAL 3 TIMES DAILY PRN
Status: DISCONTINUED | OUTPATIENT
Start: 2021-02-07 | End: 2021-02-11 | Stop reason: HOSPADM

## 2021-02-07 RX ORDER — SODIUM CHLORIDE 0.9 % (FLUSH) 0.9 %
10 SYRINGE (ML) INJECTION PRN
Status: CANCELLED | OUTPATIENT
Start: 2021-02-07

## 2021-02-07 RX ORDER — SODIUM CHLORIDE 9 MG/ML
INJECTION, SOLUTION INTRAVENOUS CONTINUOUS
Status: DISCONTINUED | OUTPATIENT
Start: 2021-02-07 | End: 2021-02-11 | Stop reason: HOSPADM

## 2021-02-07 RX ORDER — POTASSIUM CHLORIDE 20 MEQ/1
40 TABLET, EXTENDED RELEASE ORAL PRN
Status: DISCONTINUED | OUTPATIENT
Start: 2021-02-07 | End: 2021-02-11 | Stop reason: HOSPADM

## 2021-02-07 RX ORDER — FENTANYL CITRATE 50 UG/ML
50 INJECTION, SOLUTION INTRAMUSCULAR; INTRAVENOUS ONCE
Status: COMPLETED | OUTPATIENT
Start: 2021-02-07 | End: 2021-02-07

## 2021-02-07 RX ORDER — CLONAZEPAM 0.5 MG/1
0.25 TABLET ORAL 2 TIMES DAILY
Status: DISCONTINUED | OUTPATIENT
Start: 2021-02-07 | End: 2021-02-11 | Stop reason: HOSPADM

## 2021-02-07 RX ORDER — 0.9 % SODIUM CHLORIDE 0.9 %
500 INTRAVENOUS SOLUTION INTRAVENOUS ONCE
Status: COMPLETED | OUTPATIENT
Start: 2021-02-07 | End: 2021-02-07

## 2021-02-07 RX ORDER — HALOPERIDOL 5 MG/ML
2 INJECTION INTRAMUSCULAR ONCE
Status: COMPLETED | OUTPATIENT
Start: 2021-02-07 | End: 2021-02-07

## 2021-02-07 RX ORDER — FENTANYL CITRATE 50 UG/ML
50 INJECTION, SOLUTION INTRAMUSCULAR; INTRAVENOUS
Status: CANCELLED | OUTPATIENT
Start: 2021-02-07

## 2021-02-07 RX ORDER — BUSPIRONE HYDROCHLORIDE 10 MG/1
20 TABLET ORAL 3 TIMES DAILY
Status: DISCONTINUED | OUTPATIENT
Start: 2021-02-07 | End: 2021-02-11 | Stop reason: HOSPADM

## 2021-02-07 RX ORDER — ACETAMINOPHEN 325 MG/1
650 TABLET ORAL EVERY 6 HOURS PRN
Status: DISCONTINUED | OUTPATIENT
Start: 2021-02-07 | End: 2021-02-11 | Stop reason: HOSPADM

## 2021-02-07 RX ORDER — MAGNESIUM SULFATE 1 G/100ML
1000 INJECTION INTRAVENOUS PRN
Status: DISCONTINUED | OUTPATIENT
Start: 2021-02-07 | End: 2021-02-11 | Stop reason: HOSPADM

## 2021-02-07 RX ORDER — ONDANSETRON 2 MG/ML
4 INJECTION INTRAMUSCULAR; INTRAVENOUS EVERY 8 HOURS PRN
Status: CANCELLED | OUTPATIENT
Start: 2021-02-07

## 2021-02-07 RX ORDER — MORPHINE SULFATE 4 MG/ML
4 INJECTION, SOLUTION INTRAMUSCULAR; INTRAVENOUS ONCE
Status: COMPLETED | OUTPATIENT
Start: 2021-02-07 | End: 2021-02-07

## 2021-02-07 RX ORDER — ONDANSETRON 2 MG/ML
4 INJECTION INTRAMUSCULAR; INTRAVENOUS EVERY 6 HOURS PRN
Status: DISCONTINUED | OUTPATIENT
Start: 2021-02-07 | End: 2021-02-11 | Stop reason: HOSPADM

## 2021-02-07 RX ORDER — SODIUM CHLORIDE 0.9 % (FLUSH) 0.9 %
10 SYRINGE (ML) INJECTION EVERY 12 HOURS SCHEDULED
Status: DISCONTINUED | OUTPATIENT
Start: 2021-02-07 | End: 2021-02-11 | Stop reason: HOSPADM

## 2021-02-07 RX ORDER — ACETAMINOPHEN 650 MG/1
650 SUPPOSITORY RECTAL EVERY 6 HOURS PRN
Status: DISCONTINUED | OUTPATIENT
Start: 2021-02-07 | End: 2021-02-11 | Stop reason: HOSPADM

## 2021-02-07 RX ORDER — FENTANYL CITRATE 50 UG/ML
50 INJECTION, SOLUTION INTRAMUSCULAR; INTRAVENOUS ONCE
Status: DISCONTINUED | OUTPATIENT
Start: 2021-02-07 | End: 2021-02-07

## 2021-02-07 RX ORDER — POTASSIUM CHLORIDE 7.45 MG/ML
10 INJECTION INTRAVENOUS PRN
Status: DISCONTINUED | OUTPATIENT
Start: 2021-02-07 | End: 2021-02-11 | Stop reason: HOSPADM

## 2021-02-07 RX ORDER — SODIUM CHLORIDE 0.9 % (FLUSH) 0.9 %
10 SYRINGE (ML) INJECTION PRN
Status: DISCONTINUED | OUTPATIENT
Start: 2021-02-07 | End: 2021-02-11 | Stop reason: HOSPADM

## 2021-02-07 RX ORDER — FENTANYL CITRATE 50 UG/ML
25 INJECTION, SOLUTION INTRAMUSCULAR; INTRAVENOUS
Status: CANCELLED | OUTPATIENT
Start: 2021-02-07

## 2021-02-07 RX ORDER — TRAZODONE HYDROCHLORIDE 100 MG/1
100 TABLET ORAL NIGHTLY
Status: DISCONTINUED | OUTPATIENT
Start: 2021-02-07 | End: 2021-02-11 | Stop reason: HOSPADM

## 2021-02-07 RX ORDER — ONDANSETRON 2 MG/ML
4 INJECTION INTRAMUSCULAR; INTRAVENOUS ONCE
Status: COMPLETED | OUTPATIENT
Start: 2021-02-07 | End: 2021-02-07

## 2021-02-07 RX ORDER — MORPHINE SULFATE 4 MG/ML
4 INJECTION, SOLUTION INTRAMUSCULAR; INTRAVENOUS ONCE
Status: DISCONTINUED | OUTPATIENT
Start: 2021-02-07 | End: 2021-02-07

## 2021-02-07 RX ORDER — AMITRIPTYLINE HYDROCHLORIDE 25 MG/1
25 TABLET, FILM COATED ORAL NIGHTLY
Status: DISCONTINUED | OUTPATIENT
Start: 2021-02-07 | End: 2021-02-11 | Stop reason: HOSPADM

## 2021-02-07 RX ORDER — ONDANSETRON 4 MG/1
4 TABLET, ORALLY DISINTEGRATING ORAL EVERY 8 HOURS PRN
Status: DISCONTINUED | OUTPATIENT
Start: 2021-02-07 | End: 2021-02-11 | Stop reason: HOSPADM

## 2021-02-07 RX ORDER — SODIUM CHLORIDE 0.9 % (FLUSH) 0.9 %
10 SYRINGE (ML) INJECTION EVERY 12 HOURS SCHEDULED
Status: CANCELLED | OUTPATIENT
Start: 2021-02-07

## 2021-02-07 RX ORDER — POLYETHYLENE GLYCOL 3350 17 G/17G
17 POWDER, FOR SOLUTION ORAL DAILY PRN
Status: DISCONTINUED | OUTPATIENT
Start: 2021-02-07 | End: 2021-02-11 | Stop reason: HOSPADM

## 2021-02-07 RX ORDER — QUETIAPINE FUMARATE 25 MG/1
25 TABLET, FILM COATED ORAL 2 TIMES DAILY
Status: DISCONTINUED | OUTPATIENT
Start: 2021-02-07 | End: 2021-02-11 | Stop reason: HOSPADM

## 2021-02-07 RX ADMIN — SODIUM CHLORIDE 8 MG/HR: 9 INJECTION, SOLUTION INTRAVENOUS at 20:44

## 2021-02-07 RX ADMIN — FENTANYL CITRATE 50 MCG: 50 INJECTION, SOLUTION INTRAMUSCULAR; INTRAVENOUS at 08:40

## 2021-02-07 RX ADMIN — QUETIAPINE FUMARATE 25 MG: 25 TABLET ORAL at 20:43

## 2021-02-07 RX ADMIN — ONDANSETRON 4 MG: 2 INJECTION INTRAMUSCULAR; INTRAVENOUS at 07:41

## 2021-02-07 RX ADMIN — SODIUM CHLORIDE 500 ML: 9 INJECTION, SOLUTION INTRAVENOUS at 07:41

## 2021-02-07 RX ADMIN — TRAZODONE HYDROCHLORIDE 100 MG: 100 TABLET ORAL at 20:44

## 2021-02-07 RX ADMIN — AMITRIPTYLINE HYDROCHLORIDE 25 MG: 25 TABLET, FILM COATED ORAL at 20:41

## 2021-02-07 RX ADMIN — CLONAZEPAM 0.25 MG: 0.5 TABLET ORAL at 20:42

## 2021-02-07 RX ADMIN — SODIUM CHLORIDE 8 MG/HR: 9 INJECTION, SOLUTION INTRAVENOUS at 13:40

## 2021-02-07 RX ADMIN — BUSPIRONE HYDROCHLORIDE 20 MG: 10 TABLET ORAL at 12:30

## 2021-02-07 RX ADMIN — POTASSIUM BICARBONATE 40 MEQ: 782 TABLET, EFFERVESCENT ORAL at 17:08

## 2021-02-07 RX ADMIN — MEROPENEM 1000 MG: 1 INJECTION, POWDER, FOR SOLUTION INTRAVENOUS at 13:41

## 2021-02-07 RX ADMIN — ACETAMINOPHEN 650 MG: 325 TABLET ORAL at 20:41

## 2021-02-07 RX ADMIN — SODIUM CHLORIDE 500 ML: 0.9 INJECTION, SOLUTION INTRAVENOUS at 08:40

## 2021-02-07 RX ADMIN — SODIUM CHLORIDE: 9 INJECTION, SOLUTION INTRAVENOUS at 11:35

## 2021-02-07 RX ADMIN — FAMOTIDINE 20 MG: 10 INJECTION INTRAVENOUS at 07:41

## 2021-02-07 RX ADMIN — SODIUM CHLORIDE, PRESERVATIVE FREE 10 ML: 5 INJECTION INTRAVENOUS at 20:43

## 2021-02-07 RX ADMIN — CALCIUM CARBONATE 500 MG: 500 TABLET, CHEWABLE ORAL at 18:07

## 2021-02-07 RX ADMIN — SODIUM CHLORIDE 80 MG: 9 INJECTION, SOLUTION INTRAVENOUS at 12:31

## 2021-02-07 RX ADMIN — HALOPERIDOL LACTATE 2 MG: 5 INJECTION, SOLUTION INTRAMUSCULAR at 08:39

## 2021-02-07 RX ADMIN — QUETIAPINE FUMARATE 25 MG: 25 TABLET ORAL at 12:30

## 2021-02-07 RX ADMIN — BUSPIRONE HYDROCHLORIDE 20 MG: 10 TABLET ORAL at 20:42

## 2021-02-07 RX ADMIN — CLONAZEPAM 0.25 MG: 0.5 TABLET ORAL at 12:30

## 2021-02-07 RX ADMIN — MORPHINE SULFATE 4 MG: 4 INJECTION INTRAVENOUS at 07:41

## 2021-02-07 RX ADMIN — FENTANYL CITRATE 50 MCG: 50 INJECTION, SOLUTION INTRAMUSCULAR; INTRAVENOUS at 10:08

## 2021-02-07 RX ADMIN — MEROPENEM 1000 MG: 1 INJECTION, POWDER, FOR SOLUTION INTRAVENOUS at 20:43

## 2021-02-07 ASSESSMENT — PAIN DESCRIPTION - ORIENTATION: ORIENTATION: UPPER

## 2021-02-07 ASSESSMENT — ENCOUNTER SYMPTOMS
SHORTNESS OF BREATH: 1
SHORTNESS OF BREATH: 0
VOMITING: 1
BACK PAIN: 0
EYE REDNESS: 0
COLOR CHANGE: 0
CHEST TIGHTNESS: 0
SORE THROAT: 0
DIARRHEA: 0
EYE ITCHING: 0
RHINORRHEA: 0
COUGH: 0
NAUSEA: 1
WHEEZING: 0
ABDOMINAL DISTENTION: 0
BLOOD IN STOOL: 0
ABDOMINAL PAIN: 1
RHINORRHEA: 1

## 2021-02-07 ASSESSMENT — PAIN SCALES - GENERAL: PAINLEVEL_OUTOF10: 3

## 2021-02-07 ASSESSMENT — PAIN DESCRIPTION - ONSET: ONSET: PROGRESSIVE

## 2021-02-07 ASSESSMENT — PAIN DESCRIPTION - PROGRESSION: CLINICAL_PROGRESSION: GRADUALLY WORSENING

## 2021-02-07 ASSESSMENT — PAIN DESCRIPTION - PAIN TYPE: TYPE: ACUTE PAIN

## 2021-02-07 ASSESSMENT — PAIN DESCRIPTION - DESCRIPTORS: DESCRIPTORS: ACHING

## 2021-02-07 NOTE — CONSULTS
[address]    GASTROENTEROLOGY CONSULT    Patient:   Christine Johnson   :    1945   Facility:   [address] [place] [address]   Date:    2021  Admission Dx:  Coffee ground emesis [K92.0]  Requesting physician: Ally Pascual MD  Reason for consult:  Hematemesis   CC : Vomiting blood    SUBJECTIVE     HISTORY OF PRESENT ILLNESS  This is a 76 y.o.   female who was admitted 2021 with Coffee ground emesis [K92.0]. We have been asked to see the patient in consultation by Ally Pascual MD for hematemesis. 66-year-old female with history of ulcerative esophagitis, gastritis, GERD, large hiatal hernia s/p robotic repair with fundoplication , hypoxic respiratory failure  S/p PEG/trach , achalasia, cholecystectomy 2020 and ERCP with stent  For CBD obstruction,  depression and anxiety who presented to the hospital from [facility] with reports of hematemesis. Patient reports developing acute onset nauseated yesterday then developed persistent coffee-ground emesis. No bright red blood. No aggravating or alleviating factors. Patient reports she has not used her PEG tube for 1 year or greater and has been taking all food orally. Patient denies any dysphagia or odynophagia. Patient reports she is compliant with PPI twice daily at home    Patient is known to GI for her history of hematemesis as well as possible CBD stroke obstruction. Patient underwent EGD 2020 showing severe esophagitis, large hiatal hernia but no Kirill erosions. Patient also was seen by GI 2020 for dilated CBD, possible stone on imaging. Patient underwent ERCP with stent placement.   Patient was scheduled 2/10/2021 with Dr Gilbert Calle for EGD/ERCP with stent removal.     Patient currently requesting PEG tube to be removed due to lack of use as patient is been able to tolerate p.o. diet       Previous GI history:      Date of Procedure: 2020  Pre-Op Diagnosis: Coffee ground emesis     Post-Op Diagnosis: Same  Severe esophagitis  Patulous EGJ  Bile reflux gastropathy       Procedure(s):  EGD DIAGNOSTIC ONLY     Surgeon(s):  Venecia Alanis MD    Findings:   · Tortuous esophagus. · LA grade D erosive esophagitis extending from 27 to 32 cm. · Irregular Z-line was noted at 32 cm. · Patulous esophageal gastric junction. Bile was seen refluxing up into the distal esophagus. · Large hiatal hernia extending from 32 to 37 cm. No Kirill erosions. · The retroflexed view of the fundus and cardia showed no evidence of prior mentioned fundoplication. · Moderate amount of bile in the entire stomach. This was cleared using scope suctioning. · Congested mucosa in the entire stomach. · The pylorus was widely patent. The mucosa at the pylorus was characterized by erythema, granularity and congestion. · The entire examined duodenum up to the third portion appeared normal.     Recommendations  1. Start Carafate 1 g 3 times daily for 4 weeks. 2. Continue pantoprazole 40 mg twice daily for 8 weeks. 3. Follow an antireflux lifestyle. 4. We will need a repeat EGD in 8 to 10 weeks to check for healing of esophagitis. 5. Follow-up with general surgery to discuss antireflux surgery. 6. Start soft diet and advance as tolerated. 7. If patient is tolerating diet she can be discharged home and follow-up in GI clinic in 2 weeks. 8. No further recommendations from GI standpoint. GI will sign off. ERCP     Date:                                5/21/2020   Facility:                       [facility]  Endoscopist:    MAURICIO Peña FACP,FACG  Procedure: ERCP with  cholangiogram  , sphincterotomy, balloon sweep, stent placement, pancreatogram, fluoroscopy                                                            Indication: CBD stone seen on MRCP      pre op diagnosis: see indication  Post op diagnosis: see findings  Complications: none, no blood loss           Findings:  The procedure was  Back pain     Bronchitis     Caffeine use     8 coffee / day    Carpal tunnel syndrome     Cataracts, bilateral     Constipation     Depression     Diarrhea     GERD (gastroesophageal reflux disease)     H/O gastric ulcer     Hyperlipidemia     Hypertension     Mumps     MVP (mitral valve prolapse)     Dr. Lauryn Sidhu in May 2019    Recurrent UTI     Dr. Jr Noble    Sinusitis     [facility] in [facility])     Ulcerative esophagitis     Wellness examination     Dr. Teresa Gomes seen in Jan 2020     Past Surgical History:   Procedure Laterality Date    APPENDECTOMY     [address], LAPAROSCOPIC  05/22/2020     LAPAROSCOPIC ROBOTIC CHOLECYSTECTOMY, PYLOROPLASTY     CHOLECYSTECTOMY, LAPAROSCOPIC N/A 5/22/2020    XI LAPAROSCOPIC ROBOTIC CHOLECYSTECTOMY, PYLOROPLASTY performed by Jose M Serra MD at [address]      EGD  3/17/2020         ERCP  05/21/2020    with stent insertion, balloon dilation, sphinctereotomy    ERCP  5/21/2020    ** CASE IN OR WITY GI STAFF** ERCP STENT INSERTION performed by Rafael Pereira MD at [facility]    ERCP  5/21/2020    ** CASE IN OR WITH GI STAFF**ERCP SPHINCTER/PAPILLOTOMY performed by Rafael Pereira MD at [facility]    ERCP  5/21/2020    ** CASE IN OR WITH GI STAFF**ERCP DILATION BALLOON performed by Rafael Pereira MD at [address]    GASTRIC FUNDOPLICATION N/A 0/29/9517    XI LAPAROSCOPIC ROBOTIC HIATAL HERNIA REPAIR, CHERYL FUNDOPLICATION performed by Jose M Serra MD at [facility] N/A 3/27/2020    EGD PEG TUBE PLACEMENT performed by Jose M Serra MD at [address]. CATH POWER PICC TRIPLE  3/4/2020         HYSTERECTOMY      Abdominal    JOINT REPLACEMENT Right     right hip    OVARY REMOVAL      RHINOPLASTY      TONSILLECTOMY      TOTAL HIP ARTHROPLASTY      TOTAL NEPHRECTOMY      atrophic from infections, age 13   [facility] TRACHEOSTOMY N/A 3/27/2020    **ADD ON **WANTS 10:00AM **TRACHEOTOMY performed by Kory Baltazar MD at [address] 4/2/2020    TRACHEOTOMY EXCHANGE performed by Kory Baltazar MD at [address] [facility] 3/17/2020    BEDSIDE EGD ESOPHAGOGASTRODUODENOSCOPY (ICU) performed by Kory Baltazar MD at [facility] N/A 5/18/2020    EGD BIOPSY performed by Boom Vargas MD at [facility]  5/18/2020    EGD DILATION BALLOON performed by Boom Vargas MD at [address] [place] [address] N/A 7/6/2020    ** CASE IN O.R. WITH GI STAFF** EGD ESOPHAGOGASTRODUODENOSCOPY performed by Prosper Bajwa MD at [address] [place] [address] N/A 11/9/2020    EGD DIAGNOSTIC ONLY performed by Justin Lawler MD at [facility]:  Allergies   Allergen Reactions    Prednisone Anxiety    Compazine [Prochlorperazine Maleate]     Penicillin V Potassium     Penicillins Other (See Comments)     Shock- received meropenem and ceftriaxone wo issues 2020       HOME MEDICATIONS:  Prior to Admission medications    Medication Sig Start Date End Date Taking? Authorizing Provider   QUEtiapine (SEROQUEL) 25 MG tablet Take 1 tablet by mouth 2 times daily 12/16/20   Tom Wharton, APRN - NP   ferrous sulfate (FE TABS 325) 325 (65 Fe) MG EC tablet Take 1 tablet by mouth daily (with breakfast) 12/16/20   Tom Wharton, APRN - NP   furosemide (LASIX) 20 MG tablet Take 1 tablet by mouth daily 12/16/20   Tom Wharton, APRN - NP   clonazePAM (KLONOPIN) 0.5 MG tablet Take 0.5 tablets by mouth 2 times daily for 30 days. Taking 1/2 tablet BID 12/16/20 1/15/21  Tom Wharton, APRN - NP   gabapentin (NEURONTIN) 300 MG capsule Take 1 capsule by mouth 3 times daily for 30 days.  12/16/20 1/15/21  Tom Wharton, APRN - NP   clonazePAM (KLONOPIN) 0.5 MG tablet Take 0.5 tablets by mouth 2 times daily for 60 days. Taking 1/2 tablet BID 7/7/20 11/7/20  Joseline Yanez MD   pantoprazole (PROTONIX) 40 MG tablet Take 1 tablet by mouth 2 times daily 7/7/20   Joseline Yanez MD   amitriptyline (ELAVIL) 25 MG tablet Take 1 tablet by mouth nightly 7/14/20   Joseline Yanez MD   busPIRone (BUSPAR) 10 MG tablet Take 2 tablets by mouth 3 times daily 7/14/20   Joesline Yanez MD   metoprolol succinate (TOPROL XL) 25 MG extended release tablet Take 1 tablet by mouth daily 5/30/20   Janet Mccormick MD   docusate (COLACE) 50 MG/5ML liquid 10 mLs by Per G Tube route 2 times daily 4/6/20   Reuben White MD   sucralfate (CARAFATE) 1 GM tablet Take 1 tablet by mouth 4 times daily 4/6/20   Reuben White MD   traZODone (DESYREL) 100 MG tablet Take 1 tablet by mouth nightly 4/6/20   Reuben White MD   RA VITAMIN D-3 50 MCG (2000 UT) CAPS take 1 capsule by mouth once daily 2/14/20   Historical Provider, MD   Oyster Shell 500 MG TABS Take 500 mg by mouth 2 times daily     Historical Provider, MD   Acetaminophen 500 MG CAPS Take 1 tablet by mouth as needed for Pain (follow OTC instructions)    Historical Provider, MD   calcium carbonate (TUMS) 500 MG chewable tablet Take 1 tablet by mouth 3 times daily as needed for Heartburn    Historical Provider, MD   simvastatin (ZOCOR) 40 MG tablet Take 40 mg by mouth nightly.     Historical Provider, MD       CURRENT MEDICATIONS:  Scheduled Meds:   sodium chloride flush  10 mL Intravenous 2 times per day    meropenem  1,000 mg Intravenous Once    Followed by    meropenem  1,000 mg Intravenous Q8H    amitriptyline  25 mg Oral Nightly    busPIRone  20 mg Oral TID    QUEtiapine  25 mg Oral BID    traZODone  100 mg Oral Nightly    clonazePAM  0.25 mg Oral BID     Continuous Infusions:   pantoprozole (PROTONIX) infusion 8 mg/hr (02/07/21 1340)    sodium chloride 125 mL/hr at 02/07/21 1135     PRN Meds:sodium chloride flush, magnesium sulfate, polyethylene glycol, acetaminophen **OR** acetaminophen, potassium chloride, ondansetron **OR** ondansetron, potassium chloride **OR** potassium alternative oral replacement **OR** potassium chloride    SOCIAL HISTORY:     Tobacco:   reports that she has quit smoking. Her smoking use included cigarettes. She has a 50.00 pack-year smoking history. She has never used smokeless tobacco.  Alcohol:   reports no history of alcohol use. Illicit drugs:  reports no history of drug use. FAMILY HISTORY:     Family History   Problem Relation Age of Onset   Andie Frederick Cancer Mother         uterine    Heart Attack Mother     Heart Attack Father     Other Maternal Grandfather         foot gangrene    Other Paternal Grandmother         bleeding ulcers    Other Paternal Grandfather         lung cancer       REVIEW OF SYSTEMS:    Constitutional: No fever, no chills, no lethargy, no weakness. HEENT:  No headache, otalgia, itchy eyes, nasal discharge or sore throat. Cardiac:  No chest pain, dyspnea, orthopnea or PND. Chest:   No cough, phlegm or wheezing. Abdomen:  No abdominal pain, + nausea / vomiting - CGE. Neuro:  No focal weakness, abnormal movements or seizure like activity. Skin:   No rashes, no itching. :   No hematuria, no pyuria, no dysuria, no flank pain. Extremities:  No swelling or joint pains. ROS was otherwise negative except as mentioned in the [address] PHYSICAL EXAM:    BP (!) 95/58   Pulse 90   Temp 98.8 °F (37.1 °C) (Temporal)   Resp 18   Ht 5' 2\" (1.575 m)   Wt 186 lb 15.2 oz (84.8 kg)   SpO2 95%   BMI 34.19 kg/m²     GENERAL: Chronically ill, anxious, well developed, Well nourished,  HEAD:   Normocephalic, Atraumatic  EENT:   EOMI, Sclera not icteric, Oropharynx moist   NECK:   Supple, Trachea midline  LUNGS:  CTA Bilaterally  HEART:  RRR, No murmur  ABDOMEN:   Soft, Nontender, Nondistended, BS WNL, PEG tube with bilious output  EXT:   No clubbing. No cyanosis. No edema.   SKIN:   No rashes. No jaundice. No stigmata of liver disease. MUSC/SKEL:   Adequate muscle bulk for patient's age, No significant synovitis, No deformities  NEURO:  A&O x Three, CN II- XII grossly intact      LABS AND IMAGING:     CBC  Recent Labs     02/07/21  0745   WBC 12.3*   HGB 11.5*   HCT 37.3   MCV 86.9   MCH 26.8   MCHC 30.8          IMMATURE PLTs  Lab Results   Component Value Date    PLTFLUORE 131 12/14/2020    PLTFLUORE 130 02/27/2020       BMP  Recent Labs     02/07/21  0733 02/07/21  0745   NA  --  136   K  --  3.6*   CL  --  100   CO2  --  23   BUN  --  30*   CREATININE 1.15 1.09*   GLUCOSE  --  108*   CALCIUM  --  9.1       LFTS  Recent Labs     02/07/21  0745   ALKPHOS 130*   ALT 10   AST 10   PROT 6.7   BILITOT 0.24*   LABALBU 3.5       AMYLASE/LIPASE/AMMONIA  Recent Labs     02/07/21  0745   LIPASE 8*       PT/INR  No results for input(s): PROTIME, INR in the last 72 hours. ANEMIA STUDIES  No results for input(s): IRON, LABIRON, TIBC, UIBC, FERRITIN, [id number], FOLATE, OCCULTBLD in the last 72 hours. IMAGING  Xr Abdomen (kub) (single Ap View)    Result Date: 2/7/2021  EXAMINATION: ONE XRAY VIEW OF THE CHEST; ONE SUPINE XRAY VIEW(S) OF THE ABDOMEN 2/7/2021 8:19 am COMPARISON: None. HISTORY: ORDERING SYSTEM PROVIDED HISTORY: Epigastric abd pain, coffee ground emesis. h/o PUD TECHNOLOGIST PROVIDED HISTORY: Epigastric abd pain, coffee ground emesis. h/o PUD Acuity: Unknown Type of Exam: Unknown; ORDERING SYSTEM PROVIDED HISTORY: abd pain, h/o PUD TECHNOLOGIST PROVIDED HISTORY: abd pain, h/o PUD Acuity: Unknown Type of Exam: Unknown FINDINGS: CHEST: Cardiac silhouette size at the upper limits of normal.  Streaky linear opacities in the left lung base. No pneumothorax, evidence for edema or effusion. No acute osseous abnormality identified. No evidence for free air in the upper abdomen. ABDOMEN: Gastrostomy tube projects over the left upper quadrant. Biliary stent present.   Nonobstructive bowel gas pattern. Mild stool burden throughout the colon. Vascular calcifications are noted. Degenerative change in the spine with levoscoliosis. Partially visualized right hip arthroplasty. 1. Streaky linear opacities in the left lung base may represent subsegmental atelectasis versus inflammatory process. 2. No evidence for free air in the upper abdomen. 3.  Nonobstructive bowel gas pattern. Mild stool burden. Xr Chest Portable    Result Date: 2/7/2021  EXAMINATION: ONE XRAY VIEW OF THE CHEST; ONE SUPINE XRAY VIEW(S) OF THE ABDOMEN 2/7/2021 8:19 am COMPARISON: None. HISTORY: ORDERING SYSTEM PROVIDED HISTORY: Epigastric abd pain, coffee ground emesis. h/o PUD TECHNOLOGIST PROVIDED HISTORY: Epigastric abd pain, coffee ground emesis. h/o PUD Acuity: Unknown Type of Exam: Unknown; ORDERING SYSTEM PROVIDED HISTORY: abd pain, h/o PUD TECHNOLOGIST PROVIDED HISTORY: abd pain, h/o PUD Acuity: Unknown Type of Exam: Unknown FINDINGS: CHEST: Cardiac silhouette size at the upper limits of normal.  Streaky linear opacities in the left lung base. No pneumothorax, evidence for edema or effusion. No acute osseous abnormality identified. No evidence for free air in the upper abdomen. ABDOMEN: Gastrostomy tube projects over the left upper quadrant. Biliary stent present. Nonobstructive bowel gas pattern. Mild stool burden throughout the colon. Vascular calcifications are noted. Degenerative change in the spine with levoscoliosis. Partially visualized right hip arthroplasty. 1. Streaky linear opacities in the left lung base may represent subsegmental atelectasis versus inflammatory process. 2. No evidence for free air in the upper abdomen. 3.  Nonobstructive bowel gas pattern. Mild stool burden.        IMPRESSION:     77-year-old female with history of ulcerative esophagitis, gastritis, GERD, large hiatal hernia s/p robotic repair with fundoplication 79/9673, hypoxic respiratory failure  S/p PEG/trach 2020, achalasia, cholecystectomy 5/2020 and ERCP with stent  For CBD obstruction,  depression and anxiety who presented to the hospital from [facility] with reports of hematemesis. 1. Hematemesis -suspect secondary to her history of ulcerative esophagitis vs gastritis vs Kirill erosions vs PEG tube bumper ulcer  2. Hx of CBD dilation  S/p stent placement 05/2020 -stent was scheduled for removal 2/10/2021 - By Dr Abdirashid Don. LFTs fairly unremarkable without signs of biliary obstruction  3. History of hypoxic respiratory failure with PEG tube placement  4. Anemia. - Likely acute on chronic. Hemoglobin 11.5 g/dL      Old records, labs and imaging reviewed. PLAN   1. We will plan for EGD tomorrow. Pending findings, PEG tube may be removed  2. Patient will need eventual ERCP with stent removal.  Anticipate ERCP tomorrow  3. COVID-19 testing prior  4. Okay for clear liquid diet then n.p.o. at midnight  5. Monitor hemoglobin and hematocrit. Transfuse to keep hemoglobin greater than 7 g/dL or as clinically indicated. 6.  Okay to continue Protonix drip for now    This plan was formulated in collaboration with Dr. Mikey Mcguire  Thank you for allowing us to participate in the care of your patient. Electronically signed by: Christy ERICKSON on 2/7/2021 at 1:43 PM     Please note that this note was generated using a voice recognition dictation software. Although every effort was made to ensure the accuracy of this automated transcription, some errors in transcription may have occurred.

## 2021-02-07 NOTE — ED PROVIDER NOTES
[facility] 4B STEPDOWN  Emergency Department Encounter  Emergency Medicine Resident     Pt Name: Red Owens  MRN: [id number]  Jordigfmaria luisa 1945  Date ofevaluation: 2/7/21  PCP:  Charlene Contreras MD    CHIEF COMPLAINT       Chief Complaint   Patient presents with    Abdominal Pain     pt with c/o upper abdominal pain with distention, nausea, vomiting, coffee ground emesis. pt given 4mg of zofran via ems. pt fro [address] home. pt with hx of GI bleeding, ulcers. pt alert and oriented. c/o anxiety.  Emesis     HISTORY OF PRESENT ILLNESS  (Location/Symptom, Timing/Onset, Context/Setting, Quality, Duration, Modifying Factors, Severity, Associated signs/symptoms)     Red Owens is a 76 y.o. female who presents with epigastric abdominal pain as well as 2 episodes of coffee-ground emesis. Patient reports that 2 days ago she began to feel nauseated and yesterday she had 2 episodes of coffee-ground emesis. She reports that she has had this before and has had peptic ulcer disease in the past.  By chart review it appears that she has had a EGD done in November of last year which showed erosive esophagitis and a hiatal hernia. She has listed in her chart a Nissen fundoplication however there is no evidence of this on the EGD at that time. She currently reports that she has had a 5/10 pain in her epigastric region. Also reports of associated shortness of breath thinks is secondary to anxiety and does endorse anxiety at this time. Also reports subjective chills, rhinorrhea, as well some dysuria. She was admitted back in December 2020 for UTI, aphasia and was discharged after treatment with meropenem. Denies any current smoking, alcohol or drug use.   History of cholecystectomy and appendectomy in the past.    PAST MEDICAL / SURGICAL / SOCIAL / FAMILY HISTORY      has a past medical history of Anxiety, Arthritis, Back pain, Bronchitis, Caffeine use, Carpal tunnel syndrome, Cataracts, bilateral, activity     Days per week: Not on file     Minutes per session: Not on file    Stress: Not on file   Relationships    Social connections     Talks on phone: Not on file     Gets together: Not on file     Attends Hinduism service: Not on file     Active member of club or organization: Not on file     Attends meetings of clubs or organizations: Not on file     Relationship status: Not on file    Intimate partner violence     Fear of current or ex partner: Not on file     Emotionally abused: Not on file     Physically abused: Not on file     Forced sexual activity: Not on file   Other Topics Concern    Not on file   Social History Narrative    Not on file       Family History   Problem Relation Age of Onset    Cancer Mother         uterine    Heart Attack Mother     Heart Attack Father     Other Maternal Grandfather         foot gangrene    Other Paternal Grandmother         bleeding ulcers    Other Paternal Grandfather         lung cancer       Allergies:  Prednisone, Compazine [prochlorperazine maleate], Penicillin v potassium, and Penicillins    Home Medications:  Prior to Admission medications    Medication Sig Start Date End Date Taking? Authorizing Provider   QUEtiapine (SEROQUEL) 25 MG tablet Take 1 tablet by mouth 2 times daily 12/16/20   Jonah Evans APRN - NP   ferrous sulfate (FE TABS 325) 325 (65 Fe) MG EC tablet Take 1 tablet by mouth daily (with breakfast) 12/16/20   Jonah Evans APRN - NP   furosemide (LASIX) 20 MG tablet Take 1 tablet by mouth daily 12/16/20   Jonah Evans APRN - NP   clonazePAM (KLONOPIN) 0.5 MG tablet Take 0.5 tablets by mouth 2 times daily for 30 days. Taking 1/2 tablet BID 12/16/20 1/15/21  Jonah Evans APRN - NP   gabapentin (NEURONTIN) 300 MG capsule Take 1 capsule by mouth 3 times daily for 30 days. 12/16/20 1/15/21  Jonah Evans, APRN - NP   clonazePAM (KLONOPIN) 0.5 MG tablet Take 0.5 tablets by mouth 2 times daily for 60 days.  Taking 1/2 tablet BID 7/7/20 11/7/20 Andrew Aragon MD   pantoprazole (PROTONIX) 40 MG tablet Take 1 tablet by mouth 2 times daily 7/7/20   Andrew Aragon MD   amitriptyline (ELAVIL) 25 MG tablet Take 1 tablet by mouth nightly 7/14/20   Andrew Aragon MD   busPIRone (BUSPAR) 10 MG tablet Take 2 tablets by mouth 3 times daily 7/14/20   Andrew Aragon MD   metoprolol succinate (TOPROL XL) 25 MG extended release tablet Take 1 tablet by mouth daily 5/30/20   Aurea Wells MD   docusate (COLACE) 50 MG/5ML liquid 10 mLs by Per G Tube route 2 times daily 4/6/20   Kari Cat MD   sucralfate (CARAFATE) 1 GM tablet Take 1 tablet by mouth 4 times daily 4/6/20   Kari Cat MD   traZODone (DESYREL) 100 MG tablet Take 1 tablet by mouth nightly 4/6/20   Kari Cat MD RA VITAMIN D-3 50 MCG (2000 UT) CAPS take 1 capsule by mouth once daily 2/14/20   Historical Provider, MD   Oyster Shell 500 MG TABS Take 500 mg by mouth 2 times daily     Historical Provider, MD   Acetaminophen 500 MG CAPS Take 1 tablet by mouth as needed for Pain (follow OTC instructions)    Historical Provider, MD   calcium carbonate (TUMS) 500 MG chewable tablet Take 1 tablet by mouth 3 times daily as needed for Heartburn    Historical Provider, MD   simvastatin (ZOCOR) 40 MG tablet Take 40 mg by mouth nightly. Historical Provider, MD       REVIEW OF SYSTEMS    (2-9 systems for level 4, 10 or more for level 5)      Review of Systems   Constitutional: Positive for chills and diaphoresis. Negative for fever. HENT: Positive for rhinorrhea. Negative for sore throat. Eyes: Negative for redness and itching. Respiratory: Positive for shortness of breath. Negative for cough. Cardiovascular: Negative for chest pain. Gastrointestinal: Positive for abdominal pain, nausea and vomiting. Negative for blood in stool and diarrhea. Genitourinary: Positive for dysuria. Negative for frequency, hematuria and vaginal bleeding.    Musculoskeletal: Negative for arthralgias and OR/PROCEDURAL) Inpatient       MEDICATIONS ORDERED:  Orders Placed This Encounter   Medications    0.9 % sodium chloride bolus    ondansetron (ZOFRAN) injection 4 mg    morphine injection 4 mg    famotidine (PEPCID) injection 20 mg    0.9 % sodium chloride bolus    fentaNYL (SUBLIMAZE) injection 50 mcg    haloperidol lactate (HALDOL) injection 2 mg    fentaNYL (SUBLIMAZE) injection 50 mcg    pantoprazole (PROTONIX) 80 mg in sodium chloride 0.9 % 50 mL bolus    pantoprazole (PROTONIX) 80 mg in sodium chloride 0.9 % 100 mL infusion    sodium chloride flush 0.9 % injection 10 mL    sodium chloride flush 0.9 % injection 10 mL    magnesium sulfate 1000 mg in dextrose 5% 100 mL IVPB    polyethylene glycol (GLYCOLAX) packet 17 g    OR Linked Order Group     acetaminophen (TYLENOL) tablet 650 mg     acetaminophen (TYLENOL) suppository 650 mg    0.9 % sodium chloride infusion    potassium chloride 10 mEq/100 mL IVPB (Peripheral Line)    OR Linked Order Group     ondansetron (ZOFRAN-ODT) disintegrating tablet 4 mg     ondansetron (ZOFRAN) injection 4 mg    FOLLOWED BY Linked Order Group     meropenem (MERREM) 1,000 mg in sodium chloride 0.9 % 100 mL IVPB (mini-bag)      Order Specific Question:   Antimicrobial Indications      Answer:   Urinary Tract Infection     meropenem (MERREM) 1,000 mg in sodium chloride 0.9 % 100 mL IVPB (mini-bag)      Order Specific Question:   Antimicrobial Indications      Answer:   Urinary Tract Infection    amitriptyline (ELAVIL) tablet 25 mg    busPIRone (BUSPAR) tablet 20 mg    QUEtiapine (SEROQUEL) tablet 25 mg    traZODone (DESYREL) tablet 100 mg    DISCONTD: fentaNYL (SUBLIMAZE) injection 50 mcg    DISCONTD: morphine injection 4 mg    clonazePAM (KLONOPIN) tablet 0.25 mg    DISCONTD: famotidine (PEPCID) injection 20 mg    DISCONTD: ondansetron (ZOFRAN) injection 4 mg    OR Linked Order Group     potassium chloride (KLOR-CON M) extended release tablet 40 mEq     potassium bicarb-citric acid (EFFER-K) effervescent tablet 40 mEq     potassium chloride 10 mEq/100 mL IVPB (Peripheral Line)       DIAGNOSTIC RESULTS / EMERGENCYDEPARTMENT COURSE / MDM     LABS:  Results for orders placed or performed during the hospital encounter of 02/07/21   CBC WITH AUTO DIFFERENTIAL   Result Value Ref Range    WBC 12.3 (H) 3.5 - 11.3 k/uL    RBC 4.29 3.95 - 5.11 m/uL    Hemoglobin 11.5 (L) 11.9 - 15.1 g/dL    Hematocrit 37.3 36.3 - 47.1 %    MCV 86.9 82.6 - 102.9 fL    MCH 26.8 25.2 - 33.5 pg    MCHC 30.8 28.4 - 34.8 g/dL    RDW 15.9 (H) 11.8 - 14.4 %    Platelets 889 565 - 030 k/uL    MPV 10.6 8.1 - 13.5 fL    NRBC Automated 0.0 0.0 per 100 WBC    Differential Type NOT REPORTED     Seg Neutrophils 80 (H) 36 - 65 %    Lymphocytes 9 (L) 24 - 43 %    Monocytes 8 3 - 12 %    Eosinophils % 1 1 - 4 %    Basophils 0 0 - 2 %    Immature Granulocytes 2 (H) 0 %    Segs Absolute 9.98 (H) 1.50 - 8.10 k/uL    Absolute Lymph # 1.11 1.10 - 3.70 k/uL    Absolute Mono # 0.92 0.10 - 1.20 k/uL    Absolute Eos # 0.10 0.00 - 0.44 k/uL    Basophils Absolute 0.04 0.00 - 0.20 k/uL    Absolute Immature Granulocyte 0.19 0.00 - 0.30 k/uL    WBC Morphology NOT REPORTED     RBC Morphology ANISOCYTOSIS PRESENT     Platelet Estimate NOT REPORTED    COMPREHENSIVE METABOLIC PANEL   Result Value Ref Range    Glucose 108 (H) 70 - 99 mg/dL    BUN 30 (H) 8 - 23 mg/dL    CREATININE 1.09 (H) 0.50 - 0.90 mg/dL    Bun/Cre Ratio NOT REPORTED 9 - 20    Calcium 9.1 8.6 - 10.4 mg/dL    Sodium 136 135 - 144 mmol/L    Potassium 3.6 (L) 3.7 - 5.3 mmol/L    Chloride 100 98 - 107 mmol/L    CO2 23 20 - 31 mmol/L    Anion Gap 13 9 - 17 mmol/L    Alkaline Phosphatase 130 (H) 35 - 104 U/L    ALT 10 5 - 33 U/L    AST 10 <32 U/L    Total Bilirubin 0.24 (L) 0.3 - 1.2 mg/dL    Total Protein 6.7 6.4 - 8.3 g/dL    Albumin 3.5 3.5 - 5.2 g/dL    Albumin/Globulin Ratio 1.1 1.0 - 2.5    GFR Non- 49 (L) >60 mL/min    GFR  American 59 (L) >60 mL/min    GFR Comment          GFR Staging NOT REPORTED    LIPASE   Result Value Ref Range    Lipase 8 (L) 13 - 60 U/L   Lactate, Sepsis   Result Value Ref Range    Lactic Acid, Sepsis NOT REPORTED 0.5 - 1.9 mmol/L    Lactic Acid, Sepsis, Whole Blood 1.7 0.5 - 1.9 mmol/L   Urinalysis with microscopic   Result Value Ref Range    Color, UA YELLOW YELLOW    Turbidity UA CLEAR CLEAR    Glucose, Ur NEGATIVE NEGATIVE    Bilirubin Urine NEGATIVE NEGATIVE    Ketones, Urine NEGATIVE NEGATIVE    Specific Gravity, UA 1.020 1.005 - 1.030    Urine Hgb NEGATIVE NEGATIVE    pH, UA 8.0 5.0 - 8.0    Protein, UA NEGATIVE  Verified by sulfosalicylic acid test. (A) NEGATIVE    Urobilinogen, Urine Normal Normal    Nitrite, Urine POSITIVE (A) NEGATIVE    Leukocyte Esterase, Urine MODERATE (A) NEGATIVE    -          WBC, UA 10 TO 20 0 - 5 /HPF    RBC, UA 0 TO 2 0 - 4 /HPF    Casts UA  0 - 8 /LPF     2 TO 5 HYALINE Reference range defined for non-centrifuged specimen. Crystals, UA NOT REPORTED None /HPF    Epithelial Cells UA 10 TO 20 0 - 5 /HPF    Renal Epithelial, UA NOT REPORTED 0 /HPF    Bacteria, UA MANY (A) None    Mucus, UA NOT REPORTED None    Trichomonas, UA NOT REPORTED None    Amorphous, UA NOT REPORTED None    Other Observations UA NOT REPORTED NOT REQ.     Yeast, UA NOT REPORTED None   Troponin   Result Value Ref Range    Troponin, High Sensitivity 49 (H) 0 - 14 ng/L    Troponin T NOT REPORTED <0.03 ng/mL    Troponin Interp NOT REPORTED    Troponin   Result Value Ref Range    Troponin, High Sensitivity 45 (H) 0 - 14 ng/L    Troponin T NOT REPORTED <0.03 ng/mL    Troponin Interp NOT REPORTED    Brain Natriuretic Peptide   Result Value Ref Range    Pro- <300 pg/mL    BNP Interpretation Pro-BNP Reference Range:    Hemoglobin and hematocrit, blood   Result Value Ref Range    POC Hemoglobin 11.8 (L) 12.0 - 16.0 g/dL    POC Hematocrit 35 (L) 36 - 46 %   SODIUM (POC)   Result Value Ref Range    POC Sodium 137 (L) 138 - 146 mmol/L   POTASSIUM (POC)   Result Value Ref Range    POC Potassium 3.6 3.5 - 4.5 mmol/L   CHLORIDE (POC)   Result Value Ref Range    POC Chloride 103 98 - 107 mmol/L   CALCIUM, IONIC (POC)   Result Value Ref Range    POC Ionized Calcium 1.12 (L) 1.15 - 1.33 mmol/L   Venous Blood Gas, POC   Result Value Ref Range    pH, Nimesh 7.499 (H) 7.320 - 7.430    pCO2, Nimesh 32.6 (L) 41.0 - 51.0 mm Hg    pO2, Nimesh 44.4 30.0 - 50.0 mm Hg    HCO3, Venous 25.3 22.0 - 29.0 mmol/L    Total CO2, Venous 26 23.0 - 30.0 mmol/L    Negative Base Excess, Nimesh NOT REPORTED 0.0 - 2.0    Positive Base Excess, Nimesh 2 0.0 - 3.0    O2 Sat, Nimesh 85 60.0 - 85.0 %    O2 Device/Flow/% NOT REPORTED     Jeremi Test NOT REPORTED     Sample Site NOT REPORTED     Mode NOT REPORTED     FIO2 NOT REPORTED     Pt Temp NOT REPORTED     POC pH Temp NOT REPORTED     POC pCO2 Temp NOT REPORTED mm Hg    POC pO2 Temp NOT REPORTED mm Hg   Creatinine W/GFR Point of Care   Result Value Ref Range    POC Creatinine 1.15 0.51 - 1.19 mg/dL    GFR Comment 56 (L) >60 mL/min    GFR Non- 46 (L) >60 mL/min    GFR Comment         Lactic Acid, POC   Result Value Ref Range    POC Lactic Acid 1.37 0.56 - 1.39 mmol/L   POCT Glucose   Result Value Ref Range    POC Glucose 112 (H) 74 - 100 mg/dL   Anion Gap (Calc) POC   Result Value Ref Range    Anion Gap 9 7 - 16 mmol/L       RADIOLOGY:  XR CHEST PORTABLE   Final Result   1. Streaky linear opacities in the left lung base may represent subsegmental   atelectasis versus inflammatory process. 2. No evidence for free air in the upper abdomen. 3.  Nonobstructive bowel gas pattern. Mild stool burden. XR ABDOMEN (KUB) (SINGLE AP VIEW)   Final Result   1. Streaky linear opacities in the left lung base may represent subsegmental   atelectasis versus inflammatory process. 2. No evidence for free air in the upper abdomen. 3.  Nonobstructive bowel gas pattern. Mild stool burden. EKG  Rhythm: normal sinus   Rate: normal  Axis: normal  Ectopy: none  Conduction: normal  ST Segments: no acute change  T Waves: no acute change  Q Waves: none    Clinical Impression: When compared to EKG 12/13/2020, no acute changes and non-specific EKG    Normal Interval Reference:  P-wave <110 ms  -200 ms  QRS <100 ms  QT <420 ms  QTc 330-470 ms    All EKG's are interpreted by the Emergency Department Physician who either signs or Co-signsthis chart in the absence of a cardiologist.    EMERGENCY DEPARTMENT COURSE:         MDM: 57-year-old female presented with coffee-ground emesis x2, as well as epigastric abdominal pain. On exam she is nontoxic-appearing no acute distress. Borderline low blood pressure but vitals otherwise unremarkable. Heart regular rate and rhythm on my exam, lungs are clear to auscultation bilaterally. Abdomen is soft with some minimal tenderness in the epigastric region. No rebound or guarding noted. No lower extreme edema noted. Differential diagnosis includes ACS, pneumonia, thorax, peptic ulcer disease, perforated viscus, pancreatitis, ileus. Plan is for cardiac labs, abdominal labs, chest x-ray, KUB, symptomatic treatment and reassess. Work-up shows elevated troponins, UTI, BENEDICT. Discussed with internal medicine who agrees to admit the patient. Also discussed with GI who states they will scope the patient tomorrow morning. Asked to have her PEG tube site to gravity. PROCEDURES:  None    CONSULTS:  IP CONSULT TO INTERNAL MEDICINE  IP CONSULT TO GI  IP CONSULT TO CASE MANAGEMENT    FINAL IMPRESSION      1. Elevated troponin    2. Abdominal pain, epigastric    3. Coffee ground emesis          DISPOSITION / PLAN     DISPOSITION Admitted 02/07/2021 10:08:20 AM      PATIENT REFERRED TO:  No follow-up provider specified.     DISCHARGE MEDICATIONS:  Current Discharge Medication List          Lori Godoy DO  Emergency Medicine Resident  [address] [place]    (Please note that portions of this note were completed with a voice recognition program.  Efforts were made to edit thedictations but occasionally words are mis-transcribed.)       Harrison Del Valle,   Resident  02/07/21 [id number]

## 2021-02-07 NOTE — ED NOTES
Straight cath performed using sterile technique. Urine sample obtained.       Paulette Mondragon RN  02/07/21 [id number]

## 2021-02-07 NOTE — ED NOTES
Bed: 26  Expected date:   Expected time:   Means of arrival:   Comments:  ANAND Hensley, RN  02/07/21 [id number]

## 2021-02-07 NOTE — ED NOTES
pt with c/o upper abdominal pain with distention, nausea, vomiting, coffee ground emesis. pt given 4mg of zofran via ems. pt from [facility]. States that she is in nursing home due to not being able to take care of self. pt with hx of GI bleeding, ulcers. pt alert and oriented. Pt c/o anxiety. Pt with hx of hernia repair and had complications, pt had peg tube placed. Facility states that she is supposed to get the peg tube out next week at her GI appointment. PEG tube is currently not being used. Pt had her 2nd covid vaccine on Thursday. Denies any covid symptoms. Pt afebrile.       Maite Babin RN  02/07/21 [id number]

## 2021-02-07 NOTE — H&P
[address]     Department of Internal Medicine - Staff Internal Medicine Teaching Service          ADMISSION NOTE/HISTORY AND PHYSICAL EXAMINATION   Date: 2/7/2021  Patient Name: Michaela Townsend  Date of admission: 2/7/2021  7:27 AM  YOB: 1945  PCP: Erica Pelletier MD  History Obtained From:  patient    CHIEF COMPLAINT     Chief complaint: Nausea, vomiting    HISTORY OF PRESENTING ILLNESS     The patient is a pleasant 76 y.o. female the past medical history history of ulcerative esophagitis, gastritis, GERD, large hiatal hernia s/p robotic repair with fundoplication 83/6773, hypoxic respiratory failure  S/p PEG/trach 2020, achalasia, cholecystectomy 5/2020 and ERCP with stent  For CBD obstruction,  depression and anxiety  resistent UTI, anxiety, PEG tube, presents with a chief complaint of nausea, vomiting x 3-4, since yesterday. Associated with coffee-ground emesis. Denies seeing bright red blood in the vomit. Also associated with dull, nonradiating epigastric abdominal pain. Patient presents from [facility]. Patient does report burning micturition since 1 week. Associated with frequency. No change in bowel habits. Patient reports she has not used her PEG tube for 1 year or greater and has been taking all food orally. On my examination, patient's blood pressure is soft. Blood pressure 104/49. Temperature afebrile, pulse in the 80s. Saturating at 95% on room air, respiratory rate 17. Awake, alert, oriented x3. Patient does appear anxious. Has a PEG tube in place, draining biliary fluid. Patient has epigastric abdominal tenderness, no rebound tenderness, no organomegaly. Clear to auscultation bilaterally, S1-S2 heard normal, no murmurs heard. No bilateral pedal edema. Labs reviewed. Low potassium 3.6, creatinine elevated 1.09 (baseline creatinine 0.5-0.6), lactic acid not elevated 1.7, glucose 108, troponin 49> 45.   Alk phos 130, leukocytosis WBC 12.3, hemoglobin 11.5. Urinalysis shows positive nitrate, moderate leukocyte esterase, many bacteria. Chest x-ray revealed  1. Streaky linear opacities in the left lung base may represent subsegmental   atelectasis versus inflammatory process. 2. No evidence for free air in the upper abdomen.       3.  Nonobstructive bowel gas pattern.  Mild stool burden. Of note, Patient underwent EGD 11/9/2020 showing severe esophagitis, large hiatal hernia but no Kirill erosions. Patient also was seen by GI 05/2020 for dilated CBD, possible stone on imaging. Patient underwent ERCP with stent placement. Patient was scheduled 2/10/2021 with Dr Domenic Polanco for EGD/ERCP with stent removal.  Patient was last admitted to hospital in December 2020 for management of sepsis likely secondary to UTI. Had Klebsiella and ESBL UTI. Was treated with meropenem. Also had expressive aphasia. Neurology was consulted. MRI normal, EEG showing left frontal slowing. Recommended 2 weeks Holter monitoring. And was discharged home. In the ER patient received two 500 mL boluses of normal saline, fentanyl 50 mg IV once, haldol IM 2 mg once. Zofran 4mg IV once. Started on protonix drip. Review of Systems:  Review of Systems   Constitutional: Positive for activity change, appetite change and fatigue. Negative for chills, diaphoresis and fever. HENT: Negative for postnasal drip, rhinorrhea, sore throat and tinnitus. Respiratory: Negative for cough, chest tightness, shortness of breath and wheezing. Cardiovascular: Negative for chest pain, palpitations and leg swelling. Gastrointestinal: Positive for abdominal pain, nausea and vomiting. Negative for abdominal distention and blood in stool. Endocrine: Negative for polyphagia and polyuria. Genitourinary: Positive for difficulty urinating (burning sensation) and frequency. Negative for hematuria and urgency.    Musculoskeletal: Negative for back pain, gait problem and joint swelling. Skin: Negative for color change and wound. Neurological: Negative for dizziness, seizures and headaches. Psychiatric/Behavioral: Negative for agitation and confusion. The patient is nervous/anxious.           PAST MEDICAL HISTORY     Past Medical History:   Diagnosis Date    Anxiety     Arthritis     Back pain     Bronchitis     Caffeine use     8 coffee / day    Carpal tunnel syndrome     Cataracts, bilateral     Constipation     Depression     Diarrhea     GERD (gastroesophageal reflux disease)     H/O gastric ulcer     Hyperlipidemia     Hypertension     Mumps     MVP (mitral valve prolapse)     Dr. Jr Mckeon in May 2019    Recurrent UTI     Dr. Moises Hollingsworth    Sinusitis     Trache[facility] in place [facility])     Ulcerative esophagitis     Wellness examination     Dr. Morales Hester seen in Jan 2020       PAST SURGICAL HISTORY     Past Surgical History:   Procedure Laterality Date    APPENDECTOMY      CARPAL TUNNEL RELEASE      CHOLECYSTECTOMY, LAPAROSCOPIC  05/22/2020     LAPAROSCOPIC ROBOTIC CHOLECYSTECTOMY, PYLOROPLASTY     CHOLECYSTECTOMY, LAPAROSCOPIC N/A 5/22/2020    XI LAPAROSCOPIC ROBOTIC CHOLECYSTECTOMY, PYLOROPLASTY performed by Zoila Vazquez MD at [address] [facility]      EGD  3/17/2020         ERCP  05/21/2020    with stent insertion, balloon dilation, sphinctereotomy    ERCP  5/21/2020    ** CASE IN OR WITY GI STAFF** ERCP STENT INSERTION performed by Jay Hinton MD at [facility]    ERCP  5/21/2020    ** CASE IN OR WITH GI STAFF**ERCP SPHINCTER/PAPILLOTOMY performed by Jay Hinton MD at [facility]    ERCP  5/21/2020    ** CASE IN OR WITH GI STAFF**ERCP DILATION BALLOON performed by Jay Hinton MD at [address]    GASTRIC FUNDOPLICATION N/A 0/93/3335    XI LAPAROSCOPIC ROBOTIC HIATAL HERNIA REPAIR, CHERYL FUNDOPLICATION performed by Zoila Vazquez MD at [address] PLACEMENT N/A 3/27/2020    EGD PEG TUBE PLACEMENT performed by Antoine Vazquez MD at [address] CATH POWER PICC TRIPLE  3/4/2020         HYSTERECTOMY      Abdominal    JOINT REPLACEMENT Right     right hip    OVARY REMOVAL      RHINOPLASTY      TONSILLECTOMY      TOTAL HIP ARTHROPLASTY      TOTAL NEPHRECTOMY      atrophic from infections, age 13   [facility] TRACHEOSTOMY N/A 3/27/2020    **ADD ON **WANTS 10:00AM **TRACHEOTOMY performed by Antoine Vazquez MD at [address] [facility] N/A 4/2/2020    TRACHEOTOMY EXCHANGE performed by Antoine Vazquez MD at [address] [facility] 3/17/2020    BEDSIDE EGD ESOPHAGOGASTRODUODENOSCOPY (ICU) performed by Antoine Vazquez MD at [address] [place] [address] 5/18/2020    EGD BIOPSY performed by Lalo Markham MD at [address] [place] [address]  5/18/2020    EGD DILATION BALLOON performed by Lalo Markham MD at [address] [place] [address] N/A 7/6/2020    ** CASE IN O.R. WITH GI STAFF** EGD ESOPHAGOGASTRODUODENOSCOPY performed by Etha Boxer, MD at [address] [place] [address] N/A 11/9/2020    EGD DIAGNOSTIC ONLY performed by Richard Mariee MD at [facility]       ALLERGIES     Prednisone, Compazine [prochlorperazine maleate], Penicillin v potassium, and Penicillins    MEDICATIONS PRIOR TO ADMISSION     Prior to Admission medications    Medication Sig Start Date End Date Taking?  Authorizing Provider   QUEtiapine (SEROQUEL) 25 MG tablet Take 1 tablet by mouth 2 times daily 12/16/20   MARGOTH Coulter - NP   ferrous sulfate (FE TABS 325) 325 (65 Fe) MG EC tablet Take 1 tablet by mouth daily (with breakfast) 12/16/20   MARGOTH Coulter - NP   furosemide (LASIX) 20 MG tablet Take 1 tablet by mouth daily 12/16/20   MARGOTH Coulter - NP   clonazePAM (KLONOPIN) 0.5 MG tablet Take 0.5 tablets by mouth 2 times daily for 30 days. Taking 1/2 tablet BID 12/16/20 1/15/21  MARGOTH Salas NP   gabapentin (NEURONTIN) 300 MG capsule Take 1 capsule by mouth 3 times daily for 30 days. 12/16/20 1/15/21  MARGOTH Salas NP   clonazePAM (KLONOPIN) 0.5 MG tablet Take 0.5 tablets by mouth 2 times daily for 60 days. Taking 1/2 tablet BID 7/7/20 11/7/20  Amish Cantu MD   pantoprazole (PROTONIX) 40 MG tablet Take 1 tablet by mouth 2 times daily 7/7/20   Amish Cantu MD   amitriptyline (ELAVIL) 25 MG tablet Take 1 tablet by mouth nightly 7/14/20   Amish Cantu MD   busPIRone (BUSPAR) 10 MG tablet Take 2 tablets by mouth 3 times daily 7/14/20   Amish Cantu MD   metoprolol succinate (TOPROL XL) 25 MG extended release tablet Take 1 tablet by mouth daily 5/30/20   Jayden Garcia MD   docusate (COLACE) 50 MG/5ML liquid 10 mLs by Per G Tube route 2 times daily 4/6/20   Mariel Berger MD   sucralfate (CARAFATE) 1 GM tablet Take 1 tablet by mouth 4 times daily 4/6/20   Mariel Berger MD   traZODone (DESYREL) 100 MG tablet Take 1 tablet by mouth nightly 4/6/20   Mariel Berger MD RA VITAMIN D-3 50 MCG (2000 UT) CAPS take 1 capsule by mouth once daily 2/14/20   Historical Provider, MD   Oyster Shell 500 MG TABS Take 500 mg by mouth 2 times daily     Historical Provider, MD   Acetaminophen 500 MG CAPS Take 1 tablet by mouth as needed for Pain (follow OTC instructions)    Historical Provider, MD   calcium carbonate (TUMS) 500 MG chewable tablet Take 1 tablet by mouth 3 times daily as needed for Heartburn    Historical Provider, MD   simvastatin (ZOCOR) 40 MG tablet Take 40 mg by mouth nightly.     Historical Provider, MD       SOCIAL HISTORY     Tobacco: Denies  Alcohol: Denies  Illicits: Denies    FAMILY HISTORY     Family History   Problem Relation Age of Onset    Cancer Mother         uterine    Heart Attack Mother     Heart Attack Father     Other Maternal Grandfather         foot gangrene    Other Paternal Grandmother         bleeding ulcers    Other Paternal Grandfather         lung cancer       PHYSICAL EXAM     Vitals: BP (!) 95/58   Pulse 90   Temp 98.8 °F (37.1 °C) (Temporal)   Resp 18   Ht 5' 2\" (1.575 m)   Wt 186 lb 15.2 oz (84.8 kg)   SpO2 95%   BMI 34.19 kg/m²   Tmax: Temp (24hrs), Av.6 °F (37 °C), Min:98.4 °F (36.9 °C), Max:98.8 °F (37.1 °C)    Last Body weight:   Wt Readings from Last 3 Encounters:   21 186 lb 15.2 oz (84.8 kg)   20 193 lb 5.5 oz (87.7 kg)   11/10/20 183 lb (83 kg)     Body Mass Index : Body mass index is 34.19 kg/m². PHYSICAL EXAMINATION:  Constitutional: This is a well developed, well nourished, 30-34.9 - Obesity Grade I 76y.o. year old female who is alert, oriented, cooperative and in no apparent distress. Head:normocephalic and atraumatic. EENT:  PERRLA. No conjunctival injections. Septum was midline, mucosa was without erythema, exudates or cobblestoning. No thrush was noted. Neck: Supple without thyromegaly. No elevated JVP. Trachea was midline. Respiratory: Chest was symmetrical without dullness to percussion. Breath sounds bilaterally were clear to auscultation. There were no wheezes, rhonchi or rales. There is no intercostal retraction or use of accessory muscles. No egophony noted. Cardiovascular: Regular without murmur, clicks, gallops or rubs. Abdomen: Epigastric abdominal tenderness +, no rebound tenderness. Lymphatic: No lymphadenopathy. Musculoskeletal: Normal curvature of the spine. No gross muscle weakness. Extremities:  No lower extremity edema, ulcerations, tenderness, varicosities or erythema. Muscle size, tone and strength are normal.  No involuntary movements are noted. Skin:  Warm and dry. Good color, turgor and pigmentation. No lesions or scars.   No cyanosis or clubbing  Neurological/Psychiatric: The patient's general behavior, level of consciousness, thought content and emotional status is normal. INVESTIGATIONS     Laboratory Testing:     Recent Results (from the past 24 hour(s))   Venous Blood Gas, POC    Collection Time: 02/07/21  7:33 AM   Result Value Ref Range    pH, Nimesh 7.499 (H) 7.320 - 7.430    pCO2, Nimesh 32.6 (L) 41.0 - 51.0 mm Hg    pO2, Nimesh 44.4 30.0 - 50.0 mm Hg    HCO3, Venous 25.3 22.0 - 29.0 mmol/L    Total CO2, Venous 26 23.0 - 30.0 mmol/L    Negative Base Excess, Nimesh NOT REPORTED 0.0 - 2.0    Positive Base Excess, Nimesh 2 0.0 - 3.0    O2 Sat, Nimesh 85 60.0 - 85.0 %    O2 Device/Flow/% NOT REPORTED     Jeremi Test NOT REPORTED     Sample Site NOT REPORTED     Mode NOT REPORTED     FIO2 NOT REPORTED     Pt Temp NOT REPORTED     POC pH Temp NOT REPORTED     POC pCO2 Temp NOT REPORTED mm Hg    POC pO2 Temp NOT REPORTED mm Hg   Hemoglobin and hematocrit, blood    Collection Time: 02/07/21  7:33 AM   Result Value Ref Range    POC Hemoglobin 11.8 (L) 12.0 - 16.0 g/dL    POC Hematocrit 35 (L) 36 - 46 %   Creatinine W/GFR Point of Care    Collection Time: 02/07/21  7:33 AM   Result Value Ref Range    POC Creatinine 1.15 0.51 - 1.19 mg/dL    GFR Comment 56 (L) >60 mL/min    GFR Non- 46 (L) >60 mL/min    GFR Comment         SODIUM (POC)    Collection Time: 02/07/21  7:33 AM   Result Value Ref Range    POC Sodium 137 (L) 138 - 146 mmol/L   POTASSIUM (POC)    Collection Time: 02/07/21  7:33 AM   Result Value Ref Range    POC Potassium 3.6 3.5 - 4.5 mmol/L   CHLORIDE (POC)    Collection Time: 02/07/21  7:33 AM   Result Value Ref Range    POC Chloride 103 98 - 107 mmol/L   CALCIUM, IONIC (POC)    Collection Time: 02/07/21  7:33 AM   Result Value Ref Range    POC Ionized Calcium 1.12 (L) 1.15 - 1.33 mmol/L   Lactic Acid, POC    Collection Time: 02/07/21  7:33 AM   Result Value Ref Range    POC Lactic Acid 1.37 0.56 - 1.39 mmol/L   POCT Glucose    Collection Time: 02/07/21  7:33 AM   Result Value Ref Range    POC Glucose 112 (H) 74 - 100 mg/dL   Anion Gap (Calc) POC    Collection Time: 02/07/21  7:33 AM   Result Value Ref Range    Anion Gap 9 7 - 16 mmol/L   CBC WITH AUTO DIFFERENTIAL    Collection Time: 02/07/21  7:45 AM   Result Value Ref Range    WBC 12.3 (H) 3.5 - 11.3 k/uL    RBC 4.29 3.95 - 5.11 m/uL    Hemoglobin 11.5 (L) 11.9 - 15.1 g/dL    Hematocrit 37.3 36.3 - 47.1 %    MCV 86.9 82.6 - 102.9 fL    MCH 26.8 25.2 - 33.5 pg    MCHC 30.8 28.4 - 34.8 g/dL    RDW 15.9 (H) 11.8 - 14.4 %    Platelets 534 442 - 372 k/uL    MPV 10.6 8.1 - 13.5 fL    NRBC Automated 0.0 0.0 per 100 WBC    Differential Type NOT REPORTED     Seg Neutrophils 80 (H) 36 - 65 %    Lymphocytes 9 (L) 24 - 43 %    Monocytes 8 3 - 12 %    Eosinophils % 1 1 - 4 %    Basophils 0 0 - 2 %    Immature Granulocytes 2 (H) 0 %    Segs Absolute 9.98 (H) 1.50 - 8.10 k/uL    Absolute Lymph # 1.11 1.10 - 3.70 k/uL    Absolute Mono # 0.92 0.10 - 1.20 k/uL    Absolute Eos # 0.10 0.00 - 0.44 k/uL    Basophils Absolute 0.04 0.00 - 0.20 k/uL    Absolute Immature Granulocyte 0.19 0.00 - 0.30 k/uL    WBC Morphology NOT REPORTED     RBC Morphology ANISOCYTOSIS PRESENT     Platelet Estimate NOT REPORTED    COMPREHENSIVE METABOLIC PANEL    Collection Time: 02/07/21  7:45 AM   Result Value Ref Range    Glucose 108 (H) 70 - 99 mg/dL    BUN 30 (H) 8 - 23 mg/dL    CREATININE 1.09 (H) 0.50 - 0.90 mg/dL    Bun/Cre Ratio NOT REPORTED 9 - 20    Calcium 9.1 8.6 - 10.4 mg/dL    Sodium 136 135 - 144 mmol/L    Potassium 3.6 (L) 3.7 - 5.3 mmol/L    Chloride 100 98 - 107 mmol/L    CO2 23 20 - 31 mmol/L    Anion Gap 13 9 - 17 mmol/L    Alkaline Phosphatase 130 (H) 35 - 104 U/L    ALT 10 5 - 33 U/L    AST 10 <32 U/L    Total Bilirubin 0.24 (L) 0.3 - 1.2 mg/dL    Total Protein 6.7 6.4 - 8.3 g/dL    Albumin 3.5 3.5 - 5.2 g/dL    Albumin/Globulin Ratio 1.1 1.0 - 2.5    GFR Non- 49 (L) >60 mL/min    GFR  59 (L) >60 mL/min    GFR Comment          GFR Staging NOT REPORTED    LIPASE    Collection Time: 02/07/21  7:45 AM   Result Value Ref Range    Lipase 8 (L) 13 - 60 U/L   Lactate, Sepsis    Collection Time: 02/07/21  7:45 AM   Result Value Ref Range    Lactic Acid, Sepsis NOT REPORTED 0.5 - 1.9 mmol/L    Lactic Acid, Sepsis, Whole Blood 1.7 0.5 - 1.9 mmol/L   Troponin    Collection Time: 02/07/21  7:45 AM   Result Value Ref Range    Troponin, High Sensitivity 49 (H) 0 - 14 ng/L    Troponin T NOT REPORTED <0.03 ng/mL    Troponin Interp NOT REPORTED    Brain Natriuretic Peptide    Collection Time: 02/07/21  7:45 AM   Result Value Ref Range    Pro- <300 pg/mL    BNP Interpretation Pro-BNP Reference Range:    Urinalysis with microscopic    Collection Time: 02/07/21  8:34 AM   Result Value Ref Range    Color, UA YELLOW YELLOW    Turbidity UA CLEAR CLEAR    Glucose, Ur NEGATIVE NEGATIVE    Bilirubin Urine NEGATIVE NEGATIVE    Ketones, Urine NEGATIVE NEGATIVE    Specific Gravity, UA 1.020 1.005 - 1.030    Urine Hgb NEGATIVE NEGATIVE    pH, UA 8.0 5.0 - 8.0    Protein, UA NEGATIVE  Verified by sulfosalicylic acid test. (A) NEGATIVE    Urobilinogen, Urine Normal Normal    Nitrite, Urine POSITIVE (A) NEGATIVE    Leukocyte Esterase, Urine MODERATE (A) NEGATIVE    -          WBC, UA 10 TO 20 0 - 5 /HPF    RBC, UA 0 TO 2 0 - 4 /HPF    Casts UA  0 - 8 /LPF     2 TO 5 HYALINE Reference range defined for non-centrifuged specimen. Crystals, UA NOT REPORTED None /HPF    Epithelial Cells UA 10 TO 20 0 - 5 /HPF    Renal Epithelial, UA NOT REPORTED 0 /HPF    Bacteria, UA MANY (A) None    Mucus, UA NOT REPORTED None    Trichomonas, UA NOT REPORTED None    Amorphous, UA NOT REPORTED None    Other Observations UA NOT REPORTED NOT REQ. Yeast, UA NOT REPORTED None   Troponin    Collection Time: 02/07/21  8:53 AM   Result Value Ref Range    Troponin, High Sensitivity 45 (H) 0 - 14 ng/L    Troponin T NOT REPORTED <0.03 ng/mL    Troponin Interp NOT REPORTED        Imaging:   Xr Abdomen (kub) (single Ap View)    Result Date: 2/7/2021  1.  Streaky linear opacities in the left lung base may represent subsegmental atelectasis versus inflammatory process. 2. No evidence for free air in the upper abdomen. 3.  Nonobstructive bowel gas pattern. Mild stool burden. Xr Chest Portable    Result Date: 2/7/2021  1. Streaky linear opacities in the left lung base may represent subsegmental atelectasis versus inflammatory process. 2. No evidence for free air in the upper abdomen. 3.  Nonobstructive bowel gas pattern. Mild stool burden. ASSESSMENT & PLAN     ASSESSMENT / PLAN:     Principal Problem:    Coffee ground emesis  Active Problems:    BENEDICT (acute kidney injury) ([facility] [address]    Recurrent UTI    S/P percutaneous endoscopic gastrostomy (PEG) tube placement ([facility])    Acute cystitis without hematuria    Ulcerative esophagitis  Resolved Problems:    * No resolved hospital problems. *    1. Hematemesis likely secondary to history of ulcerative esophagitis versus gastritis  -S/p two 500ml fluid boluses  -Start normal saline at 125 mill per hour  -Start Protonix drip  -Consulted GI. Plan for EGD tomorrow. Pending findings, PEG tube may be removed. -Monitor H&H daily  2. BENEDICT likely pre-renal due to dehydration, nausea, vomiting  -S/p two 500ml fluid boluses  -Start normal saline at 125 mill per hour  3. Acute cystitis with history of recurrent UTI. -Give meropenem loading dose  -After loading dose, start meropenem IV 1 g every 8 hours daily  -Follow-up on urine cultures  4. History of CBD dilation s/p stent placement 05/2020.  -Patient will need eventual ERCP with stent removal  -Anticipate ERCP tomorrow, per GI.  5. S/p PEG tube placement  -  PEG tube may be removed  6. Acute on chronic anemia  -Monitor H&H daily. Transfuse to keep hemoglobin greater than 7  -Iron deficient in July 2020. Deficiency corrected in November 2020.   7. Elevated troponins  -Trend troponins  -Likely type II NSTEMI      DVT ppx: SCD cuffs  GI ppx: Protonix drip  Diet: Start clear liquids, n.p.o. midnight    PT/OT/SW:  Discharge Planning:    Marjorie Butler MD  Internal Medicine Resident, PGY-1  [facility]; [place], [place]  2/7/2021, 1:20 PM    Attending Physician Statement  I have discussed the care of [facility] with the resident team. I have examined the patient myself and taken ros and hpi , including pertinent history and exam findings,  with the resident. I have reviewed the key elements of all parts of the encounter with the resident. I agree with the assessment, plan and orders as documented by the resident. Principal Problem:    Coffee ground emesis  Active Problems:    BENEDICT (acute kidney injury) ([facility] [address]    Recurrent UTI    S/P percutaneous endoscopic gastrostomy (PEG) tube placement ([facility])    Acute cystitis without hematuria    Ulcerative esophagitis  Resolved Problems:    * No resolved hospital problems. *    Admitted from nursing facility with coffee-ground emesis, not associated with hemodynamic instability but noted to have drop in hemoglobin today. Associated BENEDICT at presentation, creatinine yesterday. Chronic trach and PEG. IV antibiotics for UA. GI consulted-for EGD today with possible removal of PEG tube.     Electronically signed by Tod Cuellar MD

## 2021-02-07 NOTE — ED PROVIDER NOTES
[facility]  Emergency Department  Faculty Attestation     I performed a history and physical examination of the patient and discussed management with the resident. I reviewed the residents note and agree with the documented findings and plan of care. Any areas of disagreement are noted on the chart. I was personally present for the key portions of any procedures. I have documented in the chart those procedures where I was not present during the key portions. I have reviewed the emergency nurses triage note. I agree with the chief complaint, past medical history, past surgical history, allergies, medications, social and family history as documented unless otherwise noted below. For Physician Assistant/ Nurse Practitioner cases/documentation I have personally evaluated this patient and have completed at least one if not all key elements of the E/M (history, physical exam, and MDM). Additional findings are as noted. Primary Care Physician:  Justine Mahajan MD    Screenings:  [unfilled]    CHIEF COMPLAINT       Chief Complaint   Patient presents with    Abdominal Pain     pt with c/o upper abdominal pain with distention, nausea, vomiting, coffee ground emesis. pt given 4mg of zofran via ems. pt fro [address]. pt with hx of GI bleeding, ulcers. pt alert and oriented. c/o anxiety.      Emesis       RECENT VITALS:    ,  Pulse: 94, Resp: 18, BP: 90/73    LABS:  Labs Reviewed   HGB/HCT - Abnormal; Notable for the following components:       Result Value    POC Hemoglobin 11.8 (*)     POC Hematocrit 35 (*)     All other components within normal limits   SODIUM (POC) - Abnormal; Notable for the following components:    POC Sodium 137 (*)     All other components within normal limits   CALCIUM, IONIC (POC) - Abnormal; Notable for the following components:    POC Ionized Calcium 1.12 (*)     All other components within normal limits   VENOUS BLOOD GAS, POINT OF CARE - Abnormal; Notable for the following components:    pH, Nimesh 7.499 (*)     pCO2, Nimesh 32.6 (*)     All other components within normal limits   CREATININE W/GFR POINT OF CARE - Abnormal; Notable for the following components:    GFR Comment 56 (*)     GFR Non- 46 (*)     All other components within normal limits   POCT GLUCOSE - Abnormal; Notable for the following components:    POC Glucose 112 (*)     All other components within normal limits   POTASSIUM (POC)   CHLORIDE (POC)   CBC WITH AUTO DIFFERENTIAL   COMPREHENSIVE METABOLIC PANEL   LIPASE   LACTATE, SEPSIS   LACTATE, SEPSIS   URINALYSIS WITH MICROSCOPIC   TROPONIN   TROPONIN   BRAIN NATRIURETIC PEPTIDE   LACTIC ACID,POINT OF CARE   ANION GAP (CALC) POC       Radiology  XR CHEST PORTABLE    (Results Pending)   XR ABDOMEN (KUB) (SINGLE AP VIEW)    (Results Pending)       EKG:   EKG Interpretation    Interpreted by me    Rhythm: normal sinus   Rate: normal  Axis: normal  Ectopy: none  Conduction: normal  ST Segments: Subtle depression laterally  T Waves: Flattening  Q Waves: none    Clinical Impression: Specific ST and T wave changes    Attending Physician Additional  Notes    Patient is abdominal pain, abdominal distention, vomiting coffee-ground emesis. She has a history of ulcers and esophageal ulcers, on Protonix twice daily. She has a PEG in place. She has had prior cholecystectomy appendectomy hysterectomy. Also had history of hiatal hernia surgery and uncertain wound complication per her history. On exam she is anxious, borderline blood pressure, borderline tachycardic. She appears pale. Abdomen is minimally distended. There is epigastric tenderness but no rebound or guarding. No tympany. No respiratory distress. Impression is upper GI bleeding, abdominal pain, hypotension/volume depletion. Plan is IV fluids, antiemetics, analgesics, antacids, labs, imaging, dissipate admission. Wilberto Preciado.  Little Davis MD, [facility]  Attending Emergency  Physician               Dillon Colmenares MD  02/07/21 [id number]

## 2021-02-08 ENCOUNTER — ANESTHESIA (OUTPATIENT)
Dept: ENDOSCOPY | Age: 76
DRG: 377 | End: 2021-02-08
Payer: MEDICARE

## 2021-02-08 ENCOUNTER — ANESTHESIA EVENT (OUTPATIENT)
Dept: ENDOSCOPY | Age: 76
DRG: 377 | End: 2021-02-08
Payer: MEDICARE

## 2021-02-08 ENCOUNTER — APPOINTMENT (OUTPATIENT)
Dept: GENERAL RADIOLOGY | Age: 76
DRG: 377 | End: 2021-02-08
Payer: MEDICARE

## 2021-02-08 VITALS — SYSTOLIC BLOOD PRESSURE: 155 MMHG | OXYGEN SATURATION: 97 % | DIASTOLIC BLOOD PRESSURE: 131 MMHG

## 2021-02-08 LAB
ABSOLUTE EOS #: 0.32 K/UL (ref 0–0.44)
ABSOLUTE IMMATURE GRANULOCYTE: 0.08 K/UL (ref 0–0.3)
ABSOLUTE LYMPH #: 0.79 K/UL (ref 1.1–3.7)
ABSOLUTE MONO #: 0.46 K/UL (ref 0.1–1.2)
ANION GAP SERPL CALCULATED.3IONS-SCNC: 10 MMOL/L (ref 9–17)
BASOPHILS # BLD: 0 % (ref 0–2)
BASOPHILS ABSOLUTE: <0.03 K/UL (ref 0–0.2)
BUN BLDV-MCNC: 17 MG/DL (ref 8–23)
BUN/CREAT BLD: ABNORMAL (ref 9–20)
CALCIUM SERPL-MCNC: 8.4 MG/DL (ref 8.6–10.4)
CHLORIDE BLD-SCNC: 108 MMOL/L (ref 98–107)
CO2: 23 MMOL/L (ref 20–31)
CREAT SERPL-MCNC: 0.96 MG/DL (ref 0.5–0.9)
DIFFERENTIAL TYPE: ABNORMAL
EKG ATRIAL RATE: 90 BPM
EKG P AXIS: 70 DEGREES
EKG P-R INTERVAL: 156 MS
EKG Q-T INTERVAL: 362 MS
EKG QRS DURATION: 68 MS
EKG QTC CALCULATION (BAZETT): 442 MS
EKG R AXIS: 41 DEGREES
EKG T AXIS: 34 DEGREES
EKG VENTRICULAR RATE: 90 BPM
EOSINOPHILS RELATIVE PERCENT: 6 % (ref 1–4)
GFR AFRICAN AMERICAN: >60 ML/MIN
GFR NON-AFRICAN AMERICAN: 57 ML/MIN
GFR SERPL CREATININE-BSD FRML MDRD: ABNORMAL ML/MIN/{1.73_M2}
GFR SERPL CREATININE-BSD FRML MDRD: ABNORMAL ML/MIN/{1.73_M2}
GLUCOSE BLD-MCNC: 95 MG/DL (ref 70–99)
HCT VFR BLD CALC: 32.5 % (ref 36.3–47.1)
HEMOGLOBIN: 9.8 G/DL (ref 11.9–15.1)
IMMATURE GRANULOCYTES: 1 %
LYMPHOCYTES # BLD: 14 % (ref 24–43)
MCH RBC QN AUTO: 26.6 PG (ref 25.2–33.5)
MCHC RBC AUTO-ENTMCNC: 30.2 G/DL (ref 28.4–34.8)
MCV RBC AUTO: 88.3 FL (ref 82.6–102.9)
MONOCYTES # BLD: 8 % (ref 3–12)
NRBC AUTOMATED: 0 PER 100 WBC
PDW BLD-RTO: 15.7 % (ref 11.8–14.4)
PLATELET # BLD: 194 K/UL (ref 138–453)
PLATELET ESTIMATE: ABNORMAL
PMV BLD AUTO: 10.6 FL (ref 8.1–13.5)
POTASSIUM SERPL-SCNC: 3.8 MMOL/L (ref 3.7–5.3)
RBC # BLD: 3.68 M/UL (ref 3.95–5.11)
RBC # BLD: ABNORMAL 10*6/UL
SARS-COV-2, RAPID: NORMAL
SARS-COV-2, RAPID: NOT DETECTED
SARS-COV-2: NORMAL
SARS-COV-2: NOT DETECTED
SEG NEUTROPHILS: 70 % (ref 36–65)
SEGMENTED NEUTROPHILS ABSOLUTE COUNT: 3.94 K/UL (ref 1.5–8.1)
SODIUM BLD-SCNC: 141 MMOL/L (ref 135–144)
SOURCE: NORMAL
SOURCE: NORMAL
WBC # BLD: 5.6 K/UL (ref 3.5–11.3)
WBC # BLD: ABNORMAL 10*3/UL

## 2021-02-08 PROCEDURE — 6360000002 HC RX W HCPCS: Performed by: INTERNAL MEDICINE

## 2021-02-08 PROCEDURE — 97162 PT EVAL MOD COMPLEX 30 MIN: CPT

## 2021-02-08 PROCEDURE — 6360000004 HC RX CONTRAST MEDICATION: Performed by: INTERNAL MEDICINE

## 2021-02-08 PROCEDURE — 36415 COLL VENOUS BLD VENIPUNCTURE: CPT

## 2021-02-08 PROCEDURE — 2580000003 HC RX 258: Performed by: STUDENT IN AN ORGANIZED HEALTH CARE EDUCATION/TRAINING PROGRAM

## 2021-02-08 PROCEDURE — 85025 COMPLETE CBC W/AUTO DIFF WBC: CPT

## 2021-02-08 PROCEDURE — 6370000000 HC RX 637 (ALT 250 FOR IP): Performed by: STUDENT IN AN ORGANIZED HEALTH CARE EDUCATION/TRAINING PROGRAM

## 2021-02-08 PROCEDURE — 6370000000 HC RX 637 (ALT 250 FOR IP): Performed by: INTERNAL MEDICINE

## 2021-02-08 PROCEDURE — 3609015200 HC ERCP REMOVE CALCULI/DEBRIS BILIARY/PANCREAS DUCT: Performed by: INTERNAL MEDICINE

## 2021-02-08 PROCEDURE — 3700000001 HC ADD 15 MINUTES (ANESTHESIA): Performed by: INTERNAL MEDICINE

## 2021-02-08 PROCEDURE — 7100000001 HC PACU RECOVERY - ADDTL 15 MIN: Performed by: INTERNAL MEDICINE

## 2021-02-08 PROCEDURE — 0FPB8DZ REMOVAL OF INTRALUMINAL DEVICE FROM HEPATOBILIARY DUCT, VIA NATURAL OR ARTIFICIAL OPENING ENDOSCOPIC: ICD-10-PCS | Performed by: INTERNAL MEDICINE

## 2021-02-08 PROCEDURE — 6360000002 HC RX W HCPCS: Performed by: STUDENT IN AN ORGANIZED HEALTH CARE EDUCATION/TRAINING PROGRAM

## 2021-02-08 PROCEDURE — 3609013300 HC EGD TUBE PLACEMENT: Performed by: INTERNAL MEDICINE

## 2021-02-08 PROCEDURE — 97530 THERAPEUTIC ACTIVITIES: CPT

## 2021-02-08 PROCEDURE — 2500000003 HC RX 250 WO HCPCS: Performed by: NURSE ANESTHETIST, CERTIFIED REGISTERED

## 2021-02-08 PROCEDURE — 3609015000 HC ERCP REMOVE FOREIGN BODY/STENT BILIARY/PANC DUCT: Performed by: INTERNAL MEDICINE

## 2021-02-08 PROCEDURE — 80048 BASIC METABOLIC PNL TOTAL CA: CPT

## 2021-02-08 PROCEDURE — U0002 COVID-19 LAB TEST NON-CDC: HCPCS

## 2021-02-08 PROCEDURE — 2580000003 HC RX 258: Performed by: INTERNAL MEDICINE

## 2021-02-08 PROCEDURE — 6360000002 HC RX W HCPCS: Performed by: NURSE ANESTHETIST, CERTIFIED REGISTERED

## 2021-02-08 PROCEDURE — 2709999900 HC NON-CHARGEABLE SUPPLY: Performed by: INTERNAL MEDICINE

## 2021-02-08 PROCEDURE — 97166 OT EVAL MOD COMPLEX 45 MIN: CPT

## 2021-02-08 PROCEDURE — C9113 INJ PANTOPRAZOLE SODIUM, VIA: HCPCS | Performed by: STUDENT IN AN ORGANIZED HEALTH CARE EDUCATION/TRAINING PROGRAM

## 2021-02-08 PROCEDURE — 7100000000 HC PACU RECOVERY - FIRST 15 MIN: Performed by: INTERNAL MEDICINE

## 2021-02-08 PROCEDURE — 93010 ELECTROCARDIOGRAM REPORT: CPT | Performed by: INTERNAL MEDICINE

## 2021-02-08 PROCEDURE — C1769 GUIDE WIRE: HCPCS | Performed by: INTERNAL MEDICINE

## 2021-02-08 PROCEDURE — BF101ZZ FLUOROSCOPY OF BILE DUCTS USING LOW OSMOLAR CONTRAST: ICD-10-PCS | Performed by: INTERNAL MEDICINE

## 2021-02-08 PROCEDURE — 2720000010 HC SURG SUPPLY STERILE: Performed by: INTERNAL MEDICINE

## 2021-02-08 PROCEDURE — 3209999900 FLUORO FOR SURGICAL PROCEDURES

## 2021-02-08 PROCEDURE — 0DP68UZ REMOVAL OF FEEDING DEVICE FROM STOMACH, VIA NATURAL OR ARTIFICIAL OPENING ENDOSCOPIC: ICD-10-PCS | Performed by: INTERNAL MEDICINE

## 2021-02-08 PROCEDURE — 97535 SELF CARE MNGMENT TRAINING: CPT

## 2021-02-08 PROCEDURE — 43247 EGD REMOVE FOREIGN BODY: CPT | Performed by: INTERNAL MEDICINE

## 2021-02-08 PROCEDURE — 3700000000 HC ANESTHESIA ATTENDED CARE: Performed by: INTERNAL MEDICINE

## 2021-02-08 PROCEDURE — 1200000000 HC SEMI PRIVATE

## 2021-02-08 RX ORDER — ONDANSETRON 2 MG/ML
4 INJECTION INTRAMUSCULAR; INTRAVENOUS
Status: ACTIVE | OUTPATIENT
Start: 2021-02-08 | End: 2021-02-08

## 2021-02-08 RX ORDER — FENTANYL CITRATE 50 UG/ML
50 INJECTION, SOLUTION INTRAMUSCULAR; INTRAVENOUS EVERY 5 MIN PRN
Status: DISCONTINUED | OUTPATIENT
Start: 2021-02-08 | End: 2021-02-11 | Stop reason: HOSPADM

## 2021-02-08 RX ORDER — DEXAMETHASONE SODIUM PHOSPHATE 4 MG/ML
INJECTION, SOLUTION INTRA-ARTICULAR; INTRALESIONAL; INTRAMUSCULAR; INTRAVENOUS; SOFT TISSUE PRN
Status: DISCONTINUED | OUTPATIENT
Start: 2021-02-08 | End: 2021-02-08 | Stop reason: SDUPTHER

## 2021-02-08 RX ORDER — FENTANYL CITRATE 50 UG/ML
25 INJECTION, SOLUTION INTRAMUSCULAR; INTRAVENOUS EVERY 5 MIN PRN
Status: DISCONTINUED | OUTPATIENT
Start: 2021-02-08 | End: 2021-02-11 | Stop reason: HOSPADM

## 2021-02-08 RX ORDER — MINERAL OIL AND WHITE PETROLATUM 150; 830 MG/G; MG/G
OINTMENT OPHTHALMIC
Status: DISCONTINUED | OUTPATIENT
Start: 2021-02-08 | End: 2021-02-08 | Stop reason: ALTCHOICE

## 2021-02-08 RX ORDER — POLYVINYL ALCOHOL 14 MG/ML
1 SOLUTION/ DROPS OPHTHALMIC
Status: DISCONTINUED | OUTPATIENT
Start: 2021-02-08 | End: 2021-02-11 | Stop reason: HOSPADM

## 2021-02-08 RX ORDER — OXYCODONE HYDROCHLORIDE 5 MG/1
2.5 TABLET ORAL ONCE
Status: COMPLETED | OUTPATIENT
Start: 2021-02-08 | End: 2021-02-08

## 2021-02-08 RX ORDER — MIDAZOLAM HYDROCHLORIDE 1 MG/ML
INJECTION INTRAMUSCULAR; INTRAVENOUS PRN
Status: DISCONTINUED | OUTPATIENT
Start: 2021-02-08 | End: 2021-02-08 | Stop reason: SDUPTHER

## 2021-02-08 RX ORDER — FENTANYL CITRATE 50 UG/ML
INJECTION, SOLUTION INTRAMUSCULAR; INTRAVENOUS PRN
Status: DISCONTINUED | OUTPATIENT
Start: 2021-02-08 | End: 2021-02-08 | Stop reason: SDUPTHER

## 2021-02-08 RX ORDER — LIDOCAINE HYDROCHLORIDE 10 MG/ML
INJECTION, SOLUTION EPIDURAL; INFILTRATION; INTRACAUDAL; PERINEURAL PRN
Status: DISCONTINUED | OUTPATIENT
Start: 2021-02-08 | End: 2021-02-08 | Stop reason: SDUPTHER

## 2021-02-08 RX ORDER — PANTOPRAZOLE SODIUM 40 MG/1
40 TABLET, DELAYED RELEASE ORAL
Status: DISCONTINUED | OUTPATIENT
Start: 2021-02-09 | End: 2021-02-11 | Stop reason: HOSPADM

## 2021-02-08 RX ORDER — PROPOFOL 10 MG/ML
INJECTION, EMULSION INTRAVENOUS PRN
Status: DISCONTINUED | OUTPATIENT
Start: 2021-02-08 | End: 2021-02-08 | Stop reason: SDUPTHER

## 2021-02-08 RX ORDER — ONDANSETRON 2 MG/ML
INJECTION INTRAMUSCULAR; INTRAVENOUS PRN
Status: DISCONTINUED | OUTPATIENT
Start: 2021-02-08 | End: 2021-02-08 | Stop reason: SDUPTHER

## 2021-02-08 RX ADMIN — POLYVINYL ALCOHOL 1 DROP: 14 SOLUTION/ DROPS OPHTHALMIC at 17:23

## 2021-02-08 RX ADMIN — TRAZODONE HYDROCHLORIDE 100 MG: 100 TABLET ORAL at 20:20

## 2021-02-08 RX ADMIN — QUETIAPINE FUMARATE 25 MG: 25 TABLET ORAL at 20:21

## 2021-02-08 RX ADMIN — DEXAMETHASONE SODIUM PHOSPHATE 4 MG: 4 INJECTION, SOLUTION INTRAMUSCULAR; INTRAVENOUS at 15:35

## 2021-02-08 RX ADMIN — OXYCODONE HYDROCHLORIDE 2.5 MG: 5 TABLET ORAL at 17:24

## 2021-02-08 RX ADMIN — MEROPENEM 1000 MG: 1 INJECTION, POWDER, FOR SOLUTION INTRAVENOUS at 20:20

## 2021-02-08 RX ADMIN — LIDOCAINE HYDROCHLORIDE 100 MG: 10 INJECTION, SOLUTION EPIDURAL; INFILTRATION; INTRACAUDAL; PERINEURAL at 15:27

## 2021-02-08 RX ADMIN — ACETAMINOPHEN 650 MG: 325 TABLET ORAL at 10:44

## 2021-02-08 RX ADMIN — MEROPENEM 1000 MG: 1 INJECTION, POWDER, FOR SOLUTION INTRAVENOUS at 12:11

## 2021-02-08 RX ADMIN — BUSPIRONE HYDROCHLORIDE 20 MG: 10 TABLET ORAL at 07:49

## 2021-02-08 RX ADMIN — MEROPENEM 1000 MG: 1 INJECTION, POWDER, FOR SOLUTION INTRAVENOUS at 03:30

## 2021-02-08 RX ADMIN — FENTANYL CITRATE 100 MCG: 50 INJECTION, SOLUTION INTRAMUSCULAR; INTRAVENOUS at 15:29

## 2021-02-08 RX ADMIN — CLONAZEPAM 0.25 MG: 0.5 TABLET ORAL at 20:20

## 2021-02-08 RX ADMIN — FENTANYL CITRATE 100 MCG: 50 INJECTION, SOLUTION INTRAMUSCULAR; INTRAVENOUS at 15:27

## 2021-02-08 RX ADMIN — PROPOFOL 20 MG: 10 INJECTION, EMULSION INTRAVENOUS at 15:31

## 2021-02-08 RX ADMIN — PROPOFOL 30 MG: 10 INJECTION, EMULSION INTRAVENOUS at 15:27

## 2021-02-08 RX ADMIN — ONDANSETRON 4 MG: 2 INJECTION INTRAMUSCULAR; INTRAVENOUS at 15:35

## 2021-02-08 RX ADMIN — BUSPIRONE HYDROCHLORIDE 20 MG: 10 TABLET ORAL at 20:20

## 2021-02-08 RX ADMIN — ACETAMINOPHEN 650 MG: 325 TABLET ORAL at 22:11

## 2021-02-08 RX ADMIN — QUETIAPINE FUMARATE 25 MG: 25 TABLET ORAL at 07:49

## 2021-02-08 RX ADMIN — SODIUM CHLORIDE, PRESERVATIVE FREE 10 ML: 5 INJECTION INTRAVENOUS at 20:20

## 2021-02-08 RX ADMIN — MIDAZOLAM HYDROCHLORIDE 2 MG: 1 INJECTION, SOLUTION INTRAMUSCULAR; INTRAVENOUS at 15:27

## 2021-02-08 RX ADMIN — CLONAZEPAM 0.25 MG: 0.5 TABLET ORAL at 07:52

## 2021-02-08 RX ADMIN — PROPOFOL 20 MG: 10 INJECTION, EMULSION INTRAVENOUS at 15:35

## 2021-02-08 RX ADMIN — ONDANSETRON 4 MG: 4 TABLET, ORALLY DISINTEGRATING ORAL at 20:19

## 2021-02-08 RX ADMIN — AMITRIPTYLINE HYDROCHLORIDE 25 MG: 25 TABLET, FILM COATED ORAL at 20:20

## 2021-02-08 RX ADMIN — CALCIUM CARBONATE 500 MG: 500 TABLET, CHEWABLE ORAL at 20:19

## 2021-02-08 RX ADMIN — SODIUM CHLORIDE, PRESERVATIVE FREE 10 ML: 5 INJECTION INTRAVENOUS at 07:52

## 2021-02-08 RX ADMIN — SODIUM CHLORIDE 8 MG/HR: 9 INJECTION, SOLUTION INTRAVENOUS at 06:57

## 2021-02-08 ASSESSMENT — PAIN SCALES - GENERAL
PAINLEVEL_OUTOF10: 7
PAINLEVEL_OUTOF10: 0
PAINLEVEL_OUTOF10: 7

## 2021-02-08 ASSESSMENT — PULMONARY FUNCTION TESTS
PIF_VALUE: 0
PIF_VALUE: 1
PIF_VALUE: 0
PIF_VALUE: 1

## 2021-02-08 ASSESSMENT — COPD QUESTIONNAIRES: CAT_SEVERITY: MODERATE

## 2021-02-08 ASSESSMENT — PAIN DESCRIPTION - LOCATION
LOCATION: ABDOMEN;KNEE
LOCATION: ABDOMEN;KNEE

## 2021-02-08 ASSESSMENT — PAIN DESCRIPTION - PAIN TYPE
TYPE: ACUTE PAIN;CHRONIC PAIN
TYPE: ACUTE PAIN;CHRONIC PAIN

## 2021-02-08 ASSESSMENT — LIFESTYLE VARIABLES: SMOKING_STATUS: 1

## 2021-02-08 ASSESSMENT — PAIN DESCRIPTION - DESCRIPTORS: DESCRIPTORS: ACHING;DISCOMFORT

## 2021-02-08 NOTE — ANESTHESIA PRE PROCEDURE
Department of Anesthesiology  Preprocedure Note       Name:  Brigid Freguson   Age:  76 y.o.  :  1945                                          MRN:  [id number]         Date:  2021      Surgeon: Praveen Polanco):  Jailene Godwin MD    )    Department of Anesthesiology  Pre-Anesthesia Evaluation/Consultation         Name:  Brigid Ferguson                                         Age:  76 y.o. MRN:  [id number]            Procedure(s):  CASE IN O.R. W/ GI STAFF - EGD WITH REMOVAL PEG TUBE  ERCP ENDOSCOPIC RETROGRADE CHOLANGIOPANCREATOGRAPHY, C-ARM    Medications  No current facility-administered medications for this visit. No current outpatient medications on file.      Facility-Administered Medications Ordered in Other Visits   Medication Dose Route Frequency Provider Last Rate Last Admin    polyvinyl alcohol (LIQUIFILM TEARS) 1.4 % ophthalmic solution 1 drop  1 drop Both Eyes Q2H PRN Rikki Garcia MD        pantoprazole (PROTONIX) 80 mg in sodium chloride 0.9 % 100 mL infusion  8 mg/hr Intravenous Continuous Mariela Osborn MD 10 mL/hr at 21 0657 8 mg/hr at 21 0657    sodium chloride flush 0.9 % injection 10 mL  10 mL Intravenous 2 times per day Mariela Osborn MD   10 mL at 21 0752    sodium chloride flush 0.9 % injection 10 mL  10 mL Intravenous PRN Mariela Osborn MD        magnesium sulfate 1000 mg in dextrose 5% 100 mL IVPB  1,000 mg Intravenous PRN Mariela Osborn MD        polyethylene glycol (GLYCOLAX) packet 17 g  17 g Oral Daily PRN Mariela Osborn MD        acetaminophen (TYLENOL) tablet 650 mg  650 mg Oral Q6H PRN Mariela Osborn MD   650 mg at 21 1044    Or    acetaminophen (TYLENOL) suppository 650 mg  650 mg Rectal Q6H PRN Mariela Osborn MD        0.9 % sodium chloride infusion   Intravenous Continuous Samantha Rider  mL/hr at 21 1135 New Bag at 21 1135    potassium chloride 10 mEq/100 mL IVPB (Peripheral Line)  10 mEq Intravenous PRN Mariela Osborn MD       [employer] ondansetron (ZOFRAN-ODT) disintegrating tablet 4 mg  4 mg Oral Q8H PRN Kehinde Vora MD        Or    ondansetron [facility]) injection 4 mg  4 mg Intravenous Q6H PRN Kehinde Vora MD        meropenem [facility]) 1,000 mg in sodium chloride 0.9 % 100 mL IVPB (mini-bag)  1,000 mg Intravenous Q8H Wood Damon MD 33.3 mL/hr at 02/08/21 1211 1,000 mg at 02/08/21 1211    amitriptyline (ELAVIL) tablet 25 mg  25 mg Oral Nightly Wood Damon MD   25 mg at 02/07/21 2041    busPIRone (BUSPAR) tablet 20 mg  20 mg Oral TID Wood Damon MD   20 mg at 02/08/21 0749    QUEtiapine (SEROQUEL) tablet 25 mg  25 mg Oral BID Wood Damon MD   25 mg at 02/08/21 0749    traZODone (DESYREL) tablet 100 mg  100 mg Oral Nightly Wood Damon MD   100 mg at 02/07/21 2044    clonazePAM (KLONOPIN) tablet 0.25 mg  0.25 mg Oral BID Wood Damon MD   0.25 mg at 02/08/21 4204    potassium chloride (KLOR-CON M) extended release tablet 40 mEq  40 mEq Oral PRN Wood Damon MD        Or    potassium bicarb-citric acid (EFFER-K) effervescent tablet 40 mEq  40 mEq Oral PRN Wood Damon MD        Or    potassium chloride 10 mEq/100 mL IVPB (Peripheral Line)  10 mEq Intravenous PRN Wood Damon MD        calcium carbonate (TUMS) chewable tablet 500 mg  500 mg Oral TID PRN Juana Gillis MD   500 mg at 02/07/21 1807       Allergies   Allergen Reactions    Prednisone Anxiety    Compazine [Prochlorperazine Maleate]     Penicillin V Potassium     Penicillins Other (See Comments)     Shock- received meropenem and ceftriaxone wo issues 2020     Patient Active Problem List   Diagnosis    Frequency of urination    Back pain    GERD (gastroesophageal reflux disease)    MVP (mitral valve prolapse)    Depression    Anxiety    BENEDICT (acute kidney injury) ([address]    Dysphagia    Hiatal hernia    History of repair of hiatal hernia    Centrilobular emphysema ([address]    Esophageal dilatation    Shock ([address]    Fever    Critical illness polyneuropathy ([address]    Gastric outlet obstruction    Recurrent UTI    Trache[facility] in place [facility])    H/O gastric ulcer    Hyperlipidemia    Hypertension    Tobacco abuse    Chronic respiratory failure with hypoxia (HCC)    S/P percutaneous endoscopic gastrostomy (PEG) tube placement (HCC)    S/P Nissen fundoplication (without gastrostomy tube) procedure    Acute blood loss anemia    Phlebitis after infusion    Esophagitis determined by endoscopy    Expressive aphasia    Transient speech disturbance    Speech disturbance    Coffee ground emesis    Acute cystitis without hematuria    Ulcerative esophagitis     Past Medical History:   Diagnosis Date    Anxiety     Arthritis     Back pain     Bronchitis     Caffeine use     8 coffee / day    Carpal tunnel syndrome     Cataracts, bilateral     Constipation     Depression     Diarrhea     GERD (gastroesophageal reflux disease)     H/O gastric ulcer     Hyperlipidemia     Hypertension     Mumps     MVP (mitral valve prolapse)     Dr. tEta Gomez in May 2019    Recurrent UTI     Dr. Ramirez Pel    Sinusitis     Trache[facility] in place [facility])     Ulcerative esophagitis     Wellness examination     Dr. Jean Egan seen in Jan 2020     Past Surgical History:   Procedure Laterality Date    APPENDECTOMY      CARPAL TUNNEL RELEASE      CHOLECYSTECTOMY, LAPAROSCOPIC  05/22/2020     LAPAROSCOPIC ROBOTIC CHOLECYSTECTOMY, PYLOROPLASTY     CHOLECYSTECTOMY, LAPAROSCOPIC N/A 5/22/2020    XI LAPAROSCOPIC ROBOTIC CHOLECYSTECTOMY, PYLOROPLASTY performed by Stepan Alfred MD at [address] [place] [address]      EGD  3/17/2020         ERCP  05/21/2020    with stent insertion, balloon dilation, sphinctereotomy    ERCP  5/21/2020    ** CASE IN OR WITY GI STAFF** ERCP STENT INSERTION performed [facility] Skye [facility]ates MD [facility]    ERCP  5/21/2020    ** CASE IN OR WITH GI STAFF**ERCP SPHINCTER/PAPILLOTOMY performed by Skye [facility]ates MD at [facility]    ERCP  5/21/2020 visit.  Vital Signs Statistics (for past 48 hrs)     Temp  Av.3 °F (36.8 °C)  Min: 97.5 °F (36.4 °C)   Min taken time: 21 1315  Max: 99.7 °F (37.6 °C)   Max taken time: 21 0700  Pulse  Av.7  Min: 68   Min taken time: 21 1214  Max: 94   Max taken time: 21 0731  Resp  Av.8  Min: 15   Min taken time: 21 0755  Max: 20   Max taken time: 21 0300  BP  Min: 87/54   Min taken time: 21 0803  Max: 124/65   Max taken time: 21 1315  MAP (mmHg)  Av.2  Min: 59   Min taken time: 21 1012  Max: 78   Max taken time: 21 0951  SpO2  Av.7 %  Min: 93 %   Min taken time: 21 0951  Max: 97 %   Max taken time: 21 0700  BP Readings from Last 3 Encounters:   21 124/65   20 137/68   11/10/20 131/68       BMI  There is no height or weight on file to calculate BMI.     CBC   Lab Results   Component Value Date    WBC 5.6 2021    RBC 3.68 2021    HGB 9.8 2021    HCT 32.5 2021    MCV 88.3 2021    RDW 15.7 2021     2021       CMP    Lab Results   Component Value Date     2021    K 3.8 2021     2021    CO2 23 2021    BUN 17 2021    CREATININE 0.96 2021    GFRAA >60 2021    LABGLOM 57 2021    GLUCOSE 95 2021    PROT 6.7 2021    CALCIUM 8.4 2021    BILITOT 0.24 2021    ALKPHOS 130 2021    AST 10 2021    ALT 10 2021       BMP    Lab Results   Component Value Date     2021    K 3.8 2021     2021    CO2 23 2021    BUN 17 2021    CREATININE 0.96 2021    CALCIUM 8.4 2021    GFRAA >60 2021    LABGLOM 57 2021    GLUCOSE 95 2021       POC Testing  Recent Labs     21  0733   POCGLU 112*   POCNA 137*   POCK 3.6   POCCL 103   POCHEMO 11.8*   POCHCT 35*       Coags    Lab Results   Component Value Date    PROTIME 10.5 2020    INR MG tablet Take 1 tablet by mouth nightly 4/6/20   Lalo Ortega MD RA VITAMIN D-3 50 MCG (2000 UT) CAPS take 1 capsule by mouth once daily 2/14/20   Historical Provider, MD   Oyster Shell 500 MG TABS Take 500 mg by mouth 2 times daily     Historical Provider, MD   Acetaminophen 500 MG CAPS Take 1 tablet by mouth as needed for Pain (follow OTC instructions)    Historical Provider, MD   calcium carbonate (TUMS) 500 MG chewable tablet Take 1 tablet by mouth 3 times daily as needed for Heartburn    Historical Provider, MD   simvastatin (ZOCOR) 40 MG tablet Take 40 mg by mouth nightly. Historical Provider, MD       Current medications:    No current facility-administered medications for this visit. No current outpatient medications on file.      Facility-Administered Medications Ordered in Other Visits   Medication Dose Route Frequency Provider Last Rate Last Admin    polyvinyl alcohol (LIQUIFILM TEARS) 1.4 % ophthalmic solution 1 drop  1 drop Both Eyes Q2H PRN Avila Chester MD        pantoprazole (PROTONIX) 80 mg in sodium chloride 0.9 % 100 mL infusion  8 mg/hr Intravenous Continuous Kristi Molina MD 10 mL/hr at 02/08/21 0657 8 mg/hr at 02/08/21 0657    sodium chloride flush 0.9 % injection 10 mL  10 mL Intravenous 2 times per day Kristi Molina MD   10 mL at 02/08/21 0752    sodium chloride flush 0.9 % injection 10 mL  10 mL Intravenous PRN Kristi Molina MD        magnesium sulfate 1000 mg in dextrose 5% 100 mL IVPB  1,000 mg Intravenous PRN Kristi Molina MD        polyethylene glycol [facility]) packet 17 g  17 g Oral Daily PRN Kristi Molina MD        acetaminophen (TYLENOL) tablet 650 mg  650 mg Oral Q6H PRN Kristi Molina MD   650 mg at 02/08/21 1044    Or    acetaminophen (TYLENOL) suppository 650 mg  650 mg Rectal Q6H PRN Kristi Molina MD        0.9 % sodium chloride infusion   Intravenous Continuous Madie Aquino  mL/hr at 02/07/21 1135 New Bag at 02/07/21 1135    potassium chloride 10 mEq/100 mL IVPB (Peripheral Line)  10 mEq Intravenous PRN Lindsay Leary MD        ondansetron (ZOFRAN-ODT) disintegrating tablet 4 mg  4 mg Oral Q8H PRN Lindsay Leary MD        Or    ondansetron [facility]) injection 4 mg  4 mg Intravenous Q6H PRN Lindsay Laery MD        meropenem [facility]) 1,000 mg in sodium chloride 0.9 % 100 mL IVPB (mini-bag)  1,000 mg Intravenous Q8H Jana Lomas MD 33.3 mL/hr at 02/08/21 1211 1,000 mg at 02/08/21 1211    amitriptyline (ELAVIL) tablet 25 mg  25 mg Oral Nightly Jana Lomas MD   25 mg at 02/07/21 2041    busPIRone (BUSPAR) tablet 20 mg  20 mg Oral TID Jana Lomas MD   20 mg at 02/08/21 0749    QUEtiapine (SEROQUEL) tablet 25 mg  25 mg Oral BID Jana Lomas MD   25 mg at 02/08/21 0749    traZODone (DESYREL) tablet 100 mg  100 mg Oral Nightly Jana Lomas MD   100 mg at 02/07/21 2044    clonazePAM (KLONOPIN) tablet 0.25 mg  0.25 mg Oral BID Jana Lomas MD   0.25 mg at 02/08/21 0752    potassium chloride (KLOR-CON M) extended release tablet 40 mEq  40 mEq Oral PRN Jana Lomas MD        Or    potassium bicarb-citric acid (EFFER-K) effervescent tablet 40 mEq  40 mEq Oral PRN Jana Lomas MD        Or    potassium chloride 10 mEq/100 mL IVPB (Peripheral Line)  10 mEq Intravenous PRN Jana Lomas MD        calcium carbonate (TUMS) chewable tablet 500 mg  500 mg Oral TID PRN Jose Maria Segura MD   500 mg at 02/07/21 1807       Allergies:     Allergies   Allergen Reactions    Prednisone Anxiety    Compazine [Prochlorperazine Maleate]     Penicillin V Potassium     Penicillins Other (See Comments)     Shock- received meropenem and ceftriaxone wo issues 2020       Problem List:    Patient Active Problem List   Diagnosis Code    Frequency of urination R35.0    Back pain M54.9    GERD (gastroesophageal reflux disease) K21.9    MVP (mitral valve prolapse) I34.1    Depression F32.9    Anxiety F41.9    BENEDICT (acute kidney injury) ([facility] N17.9    Dysphagia R13.10    Hiatal hernia K44.9    History of repair of hiatal hernia Z98.890, Z87.19    Centrilobular emphysema ([address] J43.2    Esophageal dilatation K22.8    Shock ([address] R57.9    Fever R50.9    Critical illness polyneuropathy (HCC) G62.81    Gastric outlet obstruction K31.1    Recurrent UTI N39.0    Tracheostomy in place ([facility] Z93.0    H/O gastric ulcer Z87.19    Hyperlipidemia E78.5    Hypertension I10    Tobacco abuse Z72.0    Chronic respiratory failure with hypoxia (HCC) J96.11    S/P percutaneous endoscopic gastrostomy (PEG) tube placement (HCC) Z93.1    S/P Nissen fundoplication (without gastrostomy tube) procedure Z98.890    Acute blood loss anemia D62    Phlebitis after infusion T80. 1XXA, I80.9    Esophagitis determined by endoscopy K20.90    Expressive aphasia R47.01    Transient speech disturbance R47.9    Speech disturbance R47.9    Coffee ground emesis K92.0    Acute cystitis without hematuria N30.00    Ulcerative esophagitis K22.10       Past Medical History:        Diagnosis Date    Anxiety     Arthritis     Back pain     Bronchitis     Caffeine use     8 coffee / day    Carpal tunnel syndrome     Cataracts, bilateral     Constipation     Depression     Diarrhea     GERD (gastroesophageal reflux disease)     H/O gastric ulcer     Hyperlipidemia     Hypertension     Mumps     MVP (mitral valve prolapse)     Dr. Vang Ear in May 2019    Recurrent UTI     Dr. Collette Flasher    Sinusitis     Trache[facility] in place [facility])     Ulcerative esophagitis     Wellness examination     Dr. Kelsi Harrington seen in Jan 2020       Past Surgical History:        Procedure Laterality Date    APPENDECTOMY      CARPAL TUNNEL RELEASE      CHOLECYSTECTOMY, LAPAROSCOPIC  05/22/2020     LAPAROSCOPIC ROBOTIC CHOLECYSTECTOMY, PYLOROPLASTY     CHOLECYSTECTOMY, LAPAROSCOPIC N/A 5/22/2020    XI LAPAROSCOPIC ROBOTIC CHOLECYSTECTOMY, PYLOROPLASTY performed by Ann Marie Rowe MD at [facility] 3/17/2020         ERCP  05/21/2020    with stent insertion, balloon dilation, sphinctereotomy    ERCP  5/21/2020    ** CASE IN OR WITY GI STAFF** ERCP STENT INSERTION performed by Modesto Samayoa MD at [facility]    ERCP  5/21/2020    ** CASE IN OR WITH GI STAFF**ERCP SPHINCTER/PAPILLOTOMY performed by Modesto Samayoa MD at [facility]    ERCP  5/21/2020    ** CASE IN OR WITH GI STAFF**ERCP DILATION BALLOON performed by Modesto Samayoa MD at [address]      patricia IOL    GASTRIC FUNDOPLICATION N/A 8/91/7327    XI LAPAROSCOPIC ROBOTIC HIATAL HERNIA REPAIR, CHERYL FUNDOPLICATION performed by Rahul Junior MD at [facility] N/A 3/27/2020    EGD PEG TUBE PLACEMENT performed by Rahul Junior MD at [address] [facility] [place]. CATH POWER PICC TRIPLE  3/4/2020         HYSTERECTOMY      Abdominal    JOINT REPLACEMENT Right     right hip    OVARY REMOVAL      RHINOPLASTY      TONSILLECTOMY      TOTAL HIP ARTHROPLASTY      TOTAL NEPHRECTOMY      atrophic from infections, age 13   Jaja Guile TRACHEOSTOMY N/A 3/27/2020    **ADD ON **WANTS 10:00AM **TRACHEOTOMY performed by Rahul Junior MD at [address] [facility] N/A 4/2/2020    TRACHEOTOMY EXCHANGE performed by Rahul Junior MD at [address] 3/17/2020    BEDSIDE EGD ESOPHAGOGASTRODUODENOSCOPY (ICU) performed by Rahul Junior MD at [address] [place] [address] N/A 5/18/2020    EGD BIOPSY performed by Modesto Samayoa MD at [address] [place] [address]  5/18/2020    EGD DILATION BALLOON performed by Modesto Samayoa MD at [address] [place] [address] N/A 7/6/2020    ** CASE IN O.R. WITH GI STAFF** EGD ESOPHAGOGASTRODUODENOSCOPY performed by Enid Bergman MD at [address] [place] [address] 11/9/2020    EGD DIAGNOSTIC ONLY performed by Yulisa Farley MD at [facility]       Social History:    Social History     Tobacco Use    Smoking status: Former Smoker     Packs/day: 1.00     Years: 50.00     Pack years: 50.00     Types: Cigarettes    Smokeless tobacco: Never Used    Tobacco comment: quit 1 year ago    Substance Use Topics    Alcohol use: No                                Counseling given: Not Answered  Comment: quit 1 year ago       Vital Signs (Current): There were no vitals filed for this visit.                                            BP Readings from Last 3 Encounters:   02/08/21 124/65   12/16/20 137/68   11/10/20 131/68       NPO Status:  MN     Vomited blood yesterday                                                                          BMI:   Wt Readings from Last 3 Encounters:   02/08/21 188 lb 7.9 oz (85.5 kg)   12/16/20 193 lb 5.5 oz (87.7 kg)   11/10/20 183 lb (83 kg)     There is no height or weight on file to calculate BMI.    CBC:   Lab Results   Component Value Date    WBC 5.6 02/08/2021    RBC 3.68 02/08/2021    HGB 9.8 02/08/2021    HCT 32.5 02/08/2021    MCV 88.3 02/08/2021    RDW 15.7 02/08/2021     02/08/2021       CMP:   Lab Results   Component Value Date     02/08/2021    K 3.8 02/08/2021     02/08/2021    CO2 23 02/08/2021    BUN 17 02/08/2021    CREATININE 0.96 02/08/2021    GFRAA >60 02/08/2021    LABGLOM 57 02/08/2021    GLUCOSE 95 02/08/2021    PROT 6.7 02/07/2021    CALCIUM 8.4 02/08/2021    BILITOT 0.24 02/07/2021    ALKPHOS 130 02/07/2021    AST 10 02/07/2021    ALT 10 02/07/2021       POC Tests:   Recent Labs     02/07/21  0733   POCGLU 112*   POCNA 137*   POCK 3.6   POCCL 103   POCHEMO 11.8*   POCHCT 35*       Coags:   Lab Results   Component Value Date    PROTIME 10.5 12/14/2020    INR 1.0 12/14/2020    APTT 27.4 12/13/2020       HCG (If Applicable): No results found for: PREGTESTUR, PREGSERUM, HCG, HCGQUANT     ABGs: No results found for: PHART, PO2ART, BYO4PUU, VBL6UXO, BEART, W8GZQMUM     Type & Screen (If Applicable):  No results found for: LABABO, [address]    Anesthesia Evaluation  Patient summary reviewed no history of anesthetic complications:   Airway: Mallampati: III  TM distance: >3 FB   Neck ROM: full  Comment: Trach in place  Mouth opening: > = 3 FB Dental:    (+) partials and upper dentures  Comment: Very poor dentition     Pulmonary:Negative Pulmonary ROS and normal exam    (+) pneumonia: no interval change,  COPD: moderate and severe,  current smoker                          ROS comment: Trach removed months ago   Cardiovascular:    (+) hypertension: no interval change,       ECG reviewed  Rhythm: regular  Rate: normal  Echocardiogram reviewed         Beta Blocker:  Not on Beta Blocker      ROS comment: Left ventricle is normal in size, increased septal wall thickness, global  left ventricular systolic function is hyperdynamic, calculated ejection  fraction is 68%. Mild mitral regurgitation. Mild tricuspid regurgitation. Estimated right ventricular systolic pressure is 45 mmHg, suggesting  pulmonary HTN. No significant pericardial effusion is seen.  -cp,syn,pnd     Neuro/Psych:   (+) neuromuscular disease:, psychiatric history:depression/anxiety              ROS comment: neuropathy GI/Hepatic/Renal:   (+) hiatal hernia, GERD: no interval change, PUD,          ROS comment: persistant vomiting,GI bleed. Endo/Other: Negative Endo/Other ROS   (+) blood dyscrasia: anemia:., .                 Abdominal:           Vascular:                                        Anesthesia Plan      general     ASA 4     (Asa 4  S/P trach removal, may need smaller diameter ETT)  Induction: intravenous. MIPS: Postoperative opioids intended. Anesthetic plan and risks discussed with patient. Use of blood products discussed with patient whom consented to blood products. Plan discussed with CRNA.               Delfina Mohs, MD   2/8/2021

## 2021-02-08 NOTE — OP NOTE
ERCP      Patient:   Doneta Boxer   :    1945  Acc#:    [id number]   Referring/PCP: Vel Bajwa MD  Facility:   [facility]  Date:     2021   Endoscopist:  Anny Higgins MD, [facility]    Procedure: ERCP with stent removal, cholangiogram and balloon extraction of stones and sludge        Indication: Stent removal    Postprocedure diagnosis: Blocked stent removed with snare, balloon extraction of bile duct stone and sludge    Anesthesia:  General     EBL: None from the procedure    Specimen: None    Description of Procedure:  Prior to the procedure, a history and physical exam was performed and informed consent was obtained. The risks were discussed including pancreatitis, bleeding, and perforation. After the patient was placed in the prone position eved, the therapeutic duodenoscope scope was inserted into the mouth and advanced to the second portion of the duodenum allowing the papilla to be visualized. Using the a wire guided approach with the sphincterotome, the CBD was cannulated and a cholangiogram was performed. Findings: The ampulla had a stent emerging out of the common bile duct, which was grabbed with snare and removed. The stent was blocked with debris. The initial cholangiogram revealed prominent bile duct with some filling defect in the distal CBD    A sphincterotomy had already been performed    A 12 mm balloon was advanced over the wire. Multiple balloon sweeps were performed revealing small black bile duct stones and sludge    A terminal balloon occlusion cholangiongram did not reveal any further filling defects. The duodenoscope was removed and the patient tolerated the procedure well. The pancreatic duct was not manipulated during the procedure.       Plan: Low-fat diet    Electronically signed by Anny Higgins MD, FACG on 2021 at 3:56 PM

## 2021-02-08 NOTE — PROGRESS NOTES
active)  Restraints  Initially in place: No         Patient Diagnosis(es): The primary encounter diagnosis was Elevated troponin. Diagnoses of Abdominal pain, epigastric and Coffee ground emesis were also pertinent to this visit. has a past medical history of Anxiety, Arthritis, Back pain, Bronchitis, Caffeine use, Carpal tunnel syndrome, Cataracts, bilateral, Constipation, Depression, Diarrhea, GERD (gastroesophageal reflux disease), H/O gastric ulcer, Hyperlipidemia, Hypertension, Mumps, MVP (mitral valve prolapse), Recurrent UTI, Sinusitis, Tracheostomy in place [facility]), Ulcerative esophagitis, and Wellness examination. has a past surgical history that includes Hysterectomy; total nephrectomy; Tonsillectomy; Appendectomy; Cystoscopy; Ovary removal; Tubal ligation; rhinoplasty; Carpal tunnel release; Hand surgery; Total hip arthroplasty; eye surgery; Colonoscopy; joint replacement (Right); Gastric fundoplication (N/A, 4/64/5885); hc cath power picc triple (3/4/2020); EGD (3/17/2020); Upper gastrointestinal endoscopy (N/A, 3/17/2020); tracheostomy (N/A, 3/27/2020); Gastrostomy tube placement (N/A, 3/27/2020); tracheostomy (N/A, 4/2/2020); Upper gastrointestinal endoscopy (N/A, 5/18/2020); Upper gastrointestinal endoscopy (5/18/2020); ERCP (05/21/2020); Cholecystectomy, laparoscopic (05/22/2020); ERCP (5/21/2020); ERCP (5/21/2020); ERCP (5/21/2020); Cholecystectomy, laparoscopic (N/A, 5/22/2020); Upper gastrointestinal endoscopy (N/A, 7/6/2020); and Upper gastrointestinal endoscopy (N/A, 11/9/2020). Restrictions  Restrictions/Precautions  Restrictions/Precautions: Up as Tolerated  Required Braces or Orthoses?: No  Position Activity Restriction  Other position/activity restrictions: up with assistance    Subjective   General  Patient assessed for rehabilitation services?: Yes  Family / Caregiver Present: No  General Comment  Comments: RN ok'd patient for OT/PT evaluation.  Pt pleasant and cooperative throughout. Patient Currently in Pain: Denies  Pain Assessment  Pain Assessment: 0-10  Pain Level: 7  Vital Signs  Temp: 97.5 °F (36.4 °C)  Temp Source: Temporal  Pulse: 79  Heart Rate Source: Monitor  Resp: 16  BP: 124/65  Level of Consciousness: Alert (0)  MEWS Score: 1  Patient Currently in Pain: Denies  Oxygen Therapy  SpO2: 94 %  Pulse Oximeter Device Mode: Continuous  Pulse Oximeter Device Location: Finger  O2 Device: None (Room air)    Social/Functional History  Social/Functional History  Lives With: Other (comment)  Type of Home: Facility([facility])  Home Layout: One level  Home Access: Level entry  Bathroom Shower/Tub: Walk-in shower, Shower chair with back  Bathroom Toilet: Handicap height  Bathroom Equipment: Grab bars in shower, Grab bars around toilet  Home Equipment: 4 wheeled walker, Standard walker, Wheelchair-manual(Reports using w/c for most ambulation recently)  ADL Assistance: Needs assistance  Bath: Moderate assistance  Dressing:  Moderate assistance  Toileting: Needs assistance(clothing mgmt only)  Homemaking Responsibilities: No(Facility provides 3 meals per day.)  Ambulation Assistance: Needs assistance(able to propel w/c independently)  Transfer Assistance: Independent(reports getting assistance for supervision)  Active : No  Leisure & Hobbies: iPad, LiveRSVPle books,     Objective   Vision: Impaired  Vision Exceptions: Wears glasses for reading  Hearing: Exceptions to [facility]  Hearing Exceptions: Hard of hearing/hearing concerns          Balance  Sitting Balance: Stand by assistance  Standing Balance: Contact guard assistance  Standing Balance  Time: 2-3 min  Activity: brief mgmt EOB  Comment: using RW  Functional Mobility  Functional - Mobility Device: Rolling Walker  Activity: Other  Assist Level: Contact guard assistance  Functional Mobility Comments: pt noted to progress flexed posture as ambulation progressed, not have difficulty with heel strike on the R LE  ADL  Feeding: Independent  Grooming: Modified independent   UE Bathing: Contact guard assistance; Increased time to complete;Setup  LE Bathing: Moderate assistance; Increased time to complete;Setup  UE Dressing: Minimal assistance; Increased time to complete;Setup(OT facilitated doffing and donning gown seated EOB)  LE Dressing: Moderate assistance; Increased time to complete;Setup(OT facilitated donning socks at Mod A, attempted using figure four method; Pt able to complete at mod A with set up threading on toes and elevated LE's)  Toileting: Minimal assistance;Setup; Increased time to complete(OT facilitated donning brief at Mod A standing to place and fasten in seated position with good return)  Tone RUE  RUE Tone: Normotonic  Tone LUE  LUE Tone: Normotonic  Coordination  Movements Are Fluid And Coordinated: Yes     Bed mobility  Supine to Sit: Minimal assistance  Sit to Supine: Minimal assistance  Scooting: Contact guard assistance  Transfers  Sit to stand: Contact guard assistance  Stand to sit: Contact guard assistance     Cognition  Overall Cognitive Status: Exceptions  Following Commands: Follows multistep commands with increased time; Follows multistep commands with repitition  Problem Solving: Assistance required to generate solutions  Initiation: Requires cues for some  Sequencing: Requires cues for some        Sensation  Overall Sensation Status: Impaired(Pt reports acute tingling in L foot)      LUE AROM : WFL  Left Hand AROM: WFL  RUE AROM : WFL  Right Hand AROM: WFL  LUE Strength  Gross LUE Strength: Exceptions to [facility]  L Shoulder Flex: 4/5  L Elbow Flex: 4/5  L Elbow Ext: 4/5  L Hand General: 4/5  RUE Strength  Gross RUE Strength: Exceptions to [facility]  R Shoulder Flex: 4/5  R Elbow Flex: 4/5  R Elbow Ext: 4/5  R Hand General: 4/5              Plan   Plan  Times per week: 3-4x/wk  Current Treatment Recommendations: Safety Education & Training, Patient/Caregiver Education & Training, Self-Care / ADL, Equipment Evaluation, Education, & procurement, Functional Mobility Training, Endurance Training, Balance Training, Strengthening    AM-PAC Score        AM-PAC Inpatient Daily Activity Raw Score: 18 (02/08/21 1336)  AM-PAC Inpatient ADL T-Scale Score : 38.66 (02/08/21 1336)  ADL Inpatient CMS 0-100% Score: 46.65 (02/08/21 1336)  ADL Inpatient CMS G-Code Modifier : CK (02/08/21 1336)    Goals  Short term goals  Time Frame for Short term goals: Patient will, by discharge  Short term goal 1: demo UB ADLs at [facility]  Short term goal 2: demo LB ADLs at [address] [place] [address], set up and increased time  Short term goal 3: demo functional transfers/mobility using LRD at [facility] to engage in ADLs  Short term goal 4: demo 5+ min of dynamic standing tolerance reaching in multiple planes at [facility] to engage in ADL tasks  Short term goal 5: demo use of EC/WS techs into functional tasks <1 cue to increase safety with ADL tasks     Therapy Time   Individual Concurrent Group Co-treatment   Time In 0920         Time Out 0956         Minutes 36         Timed Code Treatment Minutes: [place], OTR/L

## 2021-02-08 NOTE — ANESTHESIA POSTPROCEDURE EVALUATION
Department of Anesthesiology  Postprocedure Note    Patient: Monique Willard  MRN: [id number]  YOB: 1945  Date of evaluation: 2/8/2021  Time:  5:51 PM     Procedure Summary     Date: 02/08/21 Room / Location: [facility] TRAVEL CART [address] [place] [address]    Anesthesia Start: 1519 Anesthesia Stop: 1606    Procedures:       **CASE IN O.R. W/ GI STAFF - EGD WITH REMOVAL PEG TUBE (N/A )      ERCP STENT REMOVAL (N/A )      ERCP STONE REMOVAL Diagnosis: (HEMATEMESIS)    Surgeons: Toma Smith MD Responsible Provider: Frieda Perry MD    Anesthesia Type: general ASA Status: 4          Anesthesia Type: general    Alhaji Phase I: Alhaji Score: 10    Alhaji Phase II:      Last vitals: Reviewed and per EMR flowsheets.    POST-OP ANESTHESIA NOTE       /68   Pulse 82   Temp 99.7 °F (37.6 °C) (Temporal)   Resp 18   Ht 5' 2\" (1.575 m)   Wt 188 lb 7.9 oz (85.5 kg)   SpO2 99%   BMI 34.48 kg/m²    Pain Assessment: 0-10  Pain Level: 9         Anesthesia Post Evaluation    Patient location during evaluation: PACU  Patient participation: complete - patient participated  Level of consciousness: awake  Pain score: 9  Airway patency: patent  Nausea & Vomiting: no vomiting and no nausea  Complications: no  Cardiovascular status: hemodynamically stable  Respiratory status: acceptable  Hydration status: stable

## 2021-02-08 NOTE — PROGRESS NOTES
[facility]  Internal Medicine Teaching Residency Program  Inpatient Daily Progress Note  ______________________________________________________________________________    Patient: Rob Claros  YOB: 1945   BBT:[id number]    Acct: [de-identified]     Room: [address] [place] [address]  Admit date: 2/7/2021  Today's date: 02/08/21  Number of days in the hospital: 1    SUBJECTIVE   Admitting Diagnosis: Coffee ground emesis  CC: Nausea , vomiting, abdominal pain  Pt examined at bedside. Chart & results reviewed. Patient is hemodynamically stable. Still has abdominal tenderness. Plan for EGD today, possible ERCP stent removal and possible PEG tube removal.     Creatinine improved  Leukocytosis improved. Hb dropped from 11.8 to 9.8 this am.  Urine cultures pending. ROS:  Constitutional:  negative for chills, fevers, sweats  Respiratory:  negative for cough, dyspnea on exertion, hemoptysis, shortness of breath, wheezing  Cardiovascular:  negative for chest pain, chest pressure/discomfort, lower extremity edema, palpitations  Gastrointestinal:  negative for abdominal pain, constipation, diarrhea, nausea, vomiting  Neurological:  negative for dizziness, headache  BRIEF HISTORY   The patient is a pleasant 76 y.o. female the past medical history history of ulcerative esophagitis, gastritis, GERD, large hiatal hernia s/p robotic repair with fundoplication 02/2020, hypoxic respiratory failure  S/p PEG/trach 2020, achalasia, cholecystectomy 5/2020 and ERCP with stent  For CBD obstruction,  depression and anxiety  resistent UTI, anxiety, PEG tube, presents with a chief complaint of nausea, vomiting x 3-4, since yesterday. Associated with coffee-ground emesis. Denies seeing bright red blood in the vomit. Also associated with dull, nonradiating epigastric abdominal pain. Patient [facility]. Patient does report burning micturition since 1 week. Associated with frequency. No change in bowel habits.  Patient reports she has not used her PEG tube for 1 year or greater and has been taking all food orally.     On my examination, patient's blood pressure is soft. Blood pressure 104/49. Temperature afebrile, pulse in the 80s. Saturating at 95% on room air, respiratory rate 17. Awake, alert, oriented x3. Patient does appear anxious. Has a PEG tube in place, draining biliary fluid. Patient has epigastric abdominal tenderness, no rebound tenderness, no organomegaly. Clear to auscultation bilaterally, S1-S2 heard normal, no murmurs heard. No bilateral pedal edema.     Labs reviewed. Low potassium 3.6, creatinine elevated 1.09 (baseline creatinine 0.5-0.6), lactic acid not elevated 1.7, glucose 108, troponin 49> 45. Alk phos 130, leukocytosis WBC 12.3, hemoglobin 11.5. Urinalysis shows positive nitrate, moderate leukocyte esterase, many bacteria.     Chest x-ray revealed  1. Streaky linear opacities in the left lung base may represent subsegmental   atelectasis versus inflammatory process.       2. No evidence for free air in the upper abdomen.       3.  Nonobstructive bowel gas pattern.  Mild stool burden.            Of note, Patient underwent EGD 11/9/2020 showing severe esophagitis, large hiatal hernia but no Kirill erosions. Patient also was seen by GI 05/2020 for dilated CBD, possible stone on imaging.  Patient underwent ERCP with stent placement.  Patient was scheduled 2/10/2021 with Dr Domenic Polanco for EGD/ERCP with stent removal.  Patient was last admitted to hospital in December 2020 for management of sepsis likely secondary to UTI. Had Klebsiella and ESBL UTI. Was treated with meropenem. Also had expressive aphasia. Neurology was consulted. MRI normal, EEG showing left frontal slowing. Recommended 2 weeks Holter monitoring.   And was discharged home.     In the ER patient received two 500 mL boluses of normal saline, fentanyl 50 mg IV once, haldol IM 2 mg once. Zofran 4mg IV once. Started on protonix drip. OBJECTIVE     Vital Signs:  /60   Pulse 81   Temp 99.7 °F (37.6 °C) (Temporal)   Resp 16   Ht 5' 2\" (1.575 m)   Wt 188 lb 7.9 oz (85.5 kg)   SpO2 97%   BMI 34.48 kg/m²     Temp (24hrs), Av.3 °F (36.8 °C), Min:97.7 °F (36.5 °C), Max:99.7 °F (37.6 °C)    In: 2615   Out: 1800 [Urine:1800]    Physical Exam:   Constitutional: This is a well developed, well nourished, 30-34.9 - Obesity Grade I 76y.o. year old female who is alert, oriented, cooperative and in no apparent distress. Head:normocephalic and atraumatic. EENT:  PERRLA. No conjunctival injections. Septum was midline, mucosa was without erythema, exudates or cobblestoning. No thrush was noted. Neck: Supple without thyromegaly. No elevated JVP. Trachea was midline. Respiratory: Chest was symmetrical without dullness to percussion. Breath sounds bilaterally were clear to auscultation. There were no wheezes, rhonchi or rales. There is no intercostal retraction or use of accessory muscles. No egophony noted. Cardiovascular: Regular without murmur, clicks, gallops or rubs. Abdomen: Epigastric abdominal tenderness +, no rebound tenderness. Lymphatic: No lymphadenopathy. Musculoskeletal: Normal curvature of the spine. No gross muscle weakness. Extremities:  No lower extremity edema, ulcerations, tenderness, varicosities or erythema. Muscle size, tone and strength are normal.  No involuntary movements are noted. Skin:  Warm and dry. Good color, turgor and pigmentation. No lesions or scars.   No cyanosis or clubbing  Neurological/Psychiatric: The patient's general behavior, level of consciousness, thought content and emotional status is normal.         Medications:  Scheduled Medications:    sodium chloride flush  10 mL Intravenous 2 times per day    meropenem  1,000 mg Intravenous Q8H    amitriptyline  25 mg Oral Nightly    busPIRone  20 mg Oral TID  QUEtiapine  25 mg Oral BID    traZODone  100 mg Oral Nightly    clonazePAM  0.25 mg Oral BID     Continuous Infusions:    pantoprozole (PROTONIX) infusion 8 mg/hr (02/08/21 0657)    sodium chloride 125 mL/hr at 02/07/21 1135     PRN Medications    sodium chloride flush, 10 mL, PRN      magnesium sulfate, 1,000 mg, PRN      polyethylene glycol, 17 g, Daily PRN      acetaminophen, 650 mg, Q6H PRN    Or      acetaminophen, 650 mg, Q6H PRN      potassium chloride, 10 mEq, PRN      ondansetron, 4 mg, Q8H PRN    Or      ondansetron, 4 mg, Q6H PRN      potassium chloride, 40 mEq, PRN    Or      potassium alternative oral replacement, 40 mEq, PRN    Or      potassium chloride, 10 mEq, PRN      calcium carbonate, 500 mg, TID PRN        Diagnostic Labs:  CBC:   Recent Labs     02/07/21  0745 02/08/21  0402   WBC 12.3* 5.6   RBC 4.29 3.68*   HGB 11.5* 9.8*   HCT 37.3 32.5*   MCV 86.9 88.3   RDW 15.9* 15.7*    194     BMP:   Recent Labs     02/07/21  0733 02/07/21  0745 02/08/21  0402   NA  --  136 141   K  --  3.6* 3.8   CL  --  100 108*   CO2  --  23 23   BUN  --  30* 17   CREATININE 1.15 1.09* 0.96*     BNP: No results for input(s): BNP in the last 72 hours. PT/INR: No results for input(s): PROTIME, INR in the last 72 hours. APTT: No results for input(s): APTT in the last 72 hours. CARDIAC ENZYMES: No results for input(s): CKMB, CKMBINDEX, TROPONINI in the last 72 hours. Invalid input(s): CKTOTAL;3  FASTING LIPID PANEL:  Lab Results   Component Value Date    CHOL 203 (H) 12/14/2020    HDL 43 12/14/2020    TRIG 268 (H) 12/14/2020     LIVER PROFILE:   Recent Labs     02/07/21  0745   AST 10   ALT 10   BILITOT 0.24*   ALKPHOS 130*      MICROBIOLOGY:   Lab Results   Component Value Date/Time    CULTURE NO GROWTH 6 DAYS 12/13/2020 10:30 PM       Imaging:    Xr Abdomen (kub) (single Ap View)    Result Date: 2/7/2021  1.  Streaky linear opacities in the left lung base may represent subsegmental

## 2021-02-08 NOTE — OP NOTE
EGD      Patient:   Kalina Osorio    :    1945    Facility:   [facility]   Referring/PCP: Soni Gurera MD    Procedure:   Esophagogastroduodenoscopy with PEG tube removal  Date:     2021   Endoscopist:  Jaylyn Daigle MD, [facility]    Indication:   Coffee-ground emesis, no longer need for PEG tube    Postprocedure diagnosis:   Esophagitis likely is the source of coffee-ground emesis, successful removal of PEG tube    Anesthesia:  MAC    Complications: None    EBL: None from the procedure    Specimen: None    Description of Procedure:  Informed consent was obtained from the patient after explanation of the procedure including indications, description of the procedure,  benefits and possible risks and complications of the procedure, and alternatives. Questions were answered. The patient's history was reviewed and a directed physical examination was performed prior to the procedure. Patient was monitored throughout the procedure with pulse oximetry and periodic assessment of vital signs. Patient was sedated as noted above. With the patient in the left lateral decubitus position, the Olympus videoendoscope was placed in the patient's mouth and under direct visualization passed into the esophagus. Visualization of the esophagus, stomach, and duodenum was performed during both introduction and withdrawal of the endoscope and retroflexed view of the proximal stomach was obtained. The scope was passed to the 2nd portion of the duodenum. The patient tolerated the procedure well and was taken to the recovery area in good condition. Findings[de-identified]   Esophagus: Esophagus had grade D reflux esophagitis in the lower esophagus without any active bleeding. The esophagus was otherwise normal.  Stomach: Stomach had a PEG tube in the body of the stomach. The PEG tube was grabbed and removed with gentle pressure.   Stomach was otherwise normal  Duodenum: normal.  Ampulla had a stent emerging out

## 2021-02-08 NOTE — DISCHARGE INSTR - COC
Continuity of Care Form    Patient Name: Kalina Osorio   :  1945  MRN:  [id number]    Admit date:  2021  Discharge date: 02/10/21    Code Status Order: Full Code   Advance Directives:   Advance Care Flowsheet Documentation       Date/Time Healthcare Directive Type of Healthcare Directive Copy in [address] Agent's Name Healthcare Agent's Phone Number    21 1324  No, patient does not have an advance directive for healthcare treatment  --  --  --  --  --    21 1206  Yes, patient has an advance directive for healthcare treatment  Living will  No, copy requested from family  --  --  --            Admitting Physician:  Cher Chavez MD  PCP: Soni Guerra MD    Discharging Nurse: Jaiden Campa Unit/Room#: 7487/2732-67  Discharging Unit Phone Number: [phone]    Emergency Contact:   Extended Emergency Contact Information  Primary Emergency Contact: Hamilton Ramirez  [place] Phone: [phone]  Relation: Child    Past Surgical History:  Past Surgical History:   Procedure Laterality Date    APPENDECTOMY      CARPAL TUNNEL RELEASE      CHOLECYSTECTOMY, LAPAROSCOPIC  2020     LAPAROSCOPIC ROBOTIC CHOLECYSTECTOMY, PYLOROPLASTY     CHOLECYSTECTOMY, LAPAROSCOPIC N/A 2020    XI LAPAROSCOPIC ROBOTIC CHOLECYSTECTOMY, PYLOROPLASTY performed by Stepan Alfred MD at [facility]  3/17/2020         ERCP  2020    with stent insertion, balloon dilation, sphinctereotomy    ERCP  2020    ** CASE IN OR WITY GI STAFF** ERCP STENT INSERTION performed by Skye Yates MD at [facility]    ERCP  2020    ** CASE IN OR WITH GI STAFF**ERCP SPHINCTER/PAPILLOTOMY performed by [facility]e [facility]ates MD at [facility]    ERCP  2020    ** CASE IN OR WITH GI STAFF**ERCP DILATION BALLOON performed by Skye Yates MD at [address]    GASTRIC FUNDOPLICATION N/A     XI Esophageal dilatation K22.8    Shock ([facility] [address] R57.9    Fever R50.9    Critical illness polyneuropathy (HCC) G62.81    Gastric outlet obstruction K31.1    Recurrent UTI N39.0    Tracheostomy in place ([facility] [address] Z93.0    H/O gastric ulcer Z87.19    Hyperlipidemia E78.5    Hypertension I10    Tobacco abuse Z72.0    Chronic respiratory failure with hypoxia (HCC) J96.11    S/P percutaneous endoscopic gastrostomy (PEG) tube placement ([facility]) Z93.1    S/P Nissen fundoplication (without gastrostomy tube) procedure Z98.890    Acute blood loss anemia D62    Phlebitis after infusion T80. 1XXA, I80.9    Esophagitis determined by endoscopy K20.90    Expressive aphasia R47.01    Transient speech disturbance R47.9    Speech disturbance R47.9    Coffee ground emesis K92.0    Acute cystitis without hematuria N30.00    Ulcerative esophagitis K22.10       Isolation/Infection:   Isolation            Contact          Patient Infection Status       Infection Onset Added Last Indicated Last Indicated By Review Planned Expiration Resolved Resolved By    ESBL (Extended Spectrum Beta Lactamase) 12/13/20 12/15/20 12/13/20 Culture, Urine        MDRO (multi-drug resistant organism) 12/13/20 12/15/20 12/13/20 Culture, Urine        Klebsiella Urine 12/2020    Resolved    COVID-19 Rule Out 12/13/20 12/13/20 12/13/20 COVID-19 (Ordered)   12/13/20 Rule-Out Test Resulted    COVID-19 Rule Out 07/05/20 07/05/20 07/05/20 COVID-19 (Ordered)   07/05/20 Rule-Out Test Resulted    C-diff Rule Out 05/27/20 05/27/20 05/27/20 C DIFF TOXIN/ANTIGEN (Ordered)   05/29/20 Laron Peoples RN    COVID-19 Rule Out 05/20/20 05/20/20 05/20/20 COVID-19 (Ordered)   05/21/20 Rule-Out Test Resulted    COVID-19 Rule Out 05/14/20 05/14/20 05/14/20 COVID-19 (Ordered)   05/14/20 Rule-Out Test Resulted            Nurse Assessment:  Last Vital Signs: /65   Pulse 79   Temp 97.5 °F (36.4 °C) (Temporal)   Resp 16   Ht 5' 2\" (1.575 m)   Wt 188 lb 7.9 oz (85.5 kg)   SpO2 94%   BMI 34.48 kg/m²     Last documented pain score (0-10 scale): Pain Level: 7  Last Weight:   Wt Readings from Last 1 Encounters:   02/08/21 188 lb 7.9 oz (85.5 kg)     Mental Status:  oriented and alert    IV Access:  - PICC - site  02/10/21, insertion date: r midline    Nursing Mobility/ADLs:  Walking   Assisted  Transfer  Assisted  Bathing  Assisted  Dressing  Assisted  Toileting  Assisted  Feeding  Independent  Med Admin  Assisted  Med Delivery   whole and prefers mixed with pudding    Wound Care Documentation and Therapy:  Wound 04/01/20 Neck Mid;Distal non-healing area of tracheostomy incision (Active)   Number of days: 313       Wound 07/06/20 Coccyx Stage 1 (Active)   Number of days: 216        Elimination:  Continence:   · Bowel: Yes  · Bladder: No  Urinary Catheter: None   Colostomy/Ileostomy/Ileal Conduit: No       Date of Last BM: 02/09/2021    Intake/Output Summary (Last 24 hours) at 2/8/2021 1420  Last data filed at 2/8/2021 0752  Gross per 24 hour   Intake 2615 ml   Output 1800 ml   Net 815 ml     I/O last 3 completed shifts: In: 2615 [P.O.:200; I.V.:2415]  Out: 1300 [Urine:1300]    Safety Concerns: At Risk for Falls    Impairments/Disabilities:      None    Nutrition Therapy:  Current Nutrition Therapy:   - Oral Diet:  General    Routes of Feeding: Oral  Liquids: Thin Liquids  Daily Fluid Restriction: no  Last Modified Barium Swallow with Video (Video Swallowing Test): not done    Treatments at the Time of Hospital Discharge:   Respiratory Treatments: ***  Oxygen Therapy:  is not on home oxygen therapy.   Ventilator:    - No ventilator support    Rehab Therapies: Physical Therapy and Occupational Therapy  Weight Bearing Status/Restrictions: No weight bearing restirctions  Other Medical Equipment (for information only, NOT a DME order):  {EQUIPMENT:324218048}  Other Treatments: ***    Patient's personal belongings (please select all that are sent with patient):  {[facility] DME Belongings:389602864:::0}    RN SIGNATURE:  Electronically signed by Raquel North RN on 2/10/21 at 11:45 AM EST    CASE MANAGEMENT/SOCIAL WORK SECTION    Inpatient Status Date: 2-7    Readmission Risk Assessment Score:  Readmission Risk              Risk of Unplanned Readmission:        32           Discharging to Facility/ Agency   · Name: [facility]  · Address:[place], [place]  · Phone:  · Fax:    Dialysis Facility (if applicable)   · Name:  · Address:  · Dialysis Schedule:  · Phone:  · Fax:    / signature: Electronically signed by Aj De Dios RN on 2/10/21 at 1:03 PM EST    PHYSICIAN SECTION    Prognosis: {Prognosis:0518364357:::0}    Condition at Discharge: Esther Irby Patient Condition:847917236:::0}    Rehab Potential (if transferring to Rehab): {Prognosis:5553768798:::0}    Recommended Labs or Other Treatments After Discharge: ***    Physician Certification: I certify the above information and transfer of Red Owens  is necessary for the continuing treatment of the diagnosis listed and that she requires {Admit to Appropriate Level of Care:99825:::0} for {GREATER/LESS:142356363} 30 days.      Update Admission H&P: {CHP DME Changes in HandP:091041560:::0}    PHYSICIAN SIGNATURE:  Electronically signed by Sharon Mina MD on 2/10/2021 at 11:58 AM

## 2021-02-08 NOTE — CARE COORDINATION
Case Management Initial Discharge Plan  Logan Aguilar             Met with:patient to discuss discharge plans. Information verified: address, contacts, phone number, , insurance Yes    Emergency Contact/Next of Kin name & number:   JASE MCLEOD        Child    [phone]         PCP: Yazmin Hearn MD  Date of last visit: sees physician at [facility] Provider: MEDICARE    Discharge Planning    Living Arrangements:  Other (Comment)   Support Systems:       Home has 0 stories  0 stairs to climb to get into front door, 0stairs to climb to reach second floor  Location of bedroom/bathroom in home main    Patient able to perform ADL's:Assisted    Current Services (outpatient & in home) none  DME equipment: walker wheel chair  DME provider:     Receiving oral anticoagulation therapy? No    If indicated:   Physician managing anticoagulation treatment: n/a  Where does patient obtain lab work for ATC treatment? n/a      Potential Assistance Needed:       Patient agreeable to home care: No  [place] of choice provided:  n/a    Prior SNF/Rehab Placement and Facility[facility]  Agreeable to SNF/Rehab: Yes  [place] of choice provided: n/a     Evaluation: no    Expected Discharge date:       Patient expects to be discharged to: Follow Up Appointment: Best Day/ Time:      Transportation provider: ambulance  Transportation arrangements needed for discharge: Yes    Readmission Risk              Risk of Unplanned Readmission:        31             Does patient have a readmission risk score greater than 14?: Yes  If yes, follow-up appointment must be made within 7 days of discharge.      Goals of Care: to get scope done      Discharge Plan:[facility] - verified          Electronically signed by Maribel Ely RN on 21 at 9:27 AM EST

## 2021-02-08 NOTE — PLAN OF CARE
Problem: Skin Integrity:  Goal: Will show no infection signs and symptoms  Description: Will show no infection signs and symptoms  Outcome: Met This Shift     Problem: Falls - Risk of:  Goal: Will remain free from falls  Description: Will remain free from falls  Outcome: Met This Shift     Problem: Pain:  Goal: Pain level will decrease  Description: Pain level will decrease  Outcome: Met This Shift

## 2021-02-08 NOTE — PROGRESS NOTES
Physical Therapy    Facility/Department: [facility] 4B STEPDOWN  Initial Assessment    NAME: Michaela Townsend  : 1945  MRN: [id number]    Date of Service: 2021    Discharge Recommendations: Further therapy recommended at discharge. PT Equipment Recommendations  Equipment Needed: Yes  Mobility Devices: Mallory Mario: Rolling(Pt requires RW for safe ambulation at this time.)    Assessment   Body structures, Functions, Activity limitations: Decreased functional mobility ; Decreased sensation; Increased pain;Decreased ADL status; Decreased balance;Decreased posture;Decreased ROM; Decreased strength;Decreased high-level IADLs;Decreased safe awareness;Decreased coordination;Decreased endurance  Assessment: Pt ambulated 10ft with RW CGA this date demonstrating fair steadiness. Pt demonstrating significantly decreased endurance throughout functional mobility this date. Pt would benefit from 24 hour support upon discharge. Pt would benefit from further skilled physical therapy to address these deficits. Prognosis: Good  Decision Making: Medium Complexity  PT Education: Goals; General Safety;Gait Training;PT Role;Plan of Care; Functional Mobility Training;Transfer Training  Barriers to Learning: none  REQUIRES PT FOLLOW UP: Yes  Activity Tolerance  Activity Tolerance: Patient limited by fatigue;Patient limited by endurance       Patient Diagnosis(es): The primary encounter diagnosis was Elevated troponin. Diagnoses of Abdominal pain, epigastric and Coffee ground emesis were also pertinent to this visit. has a past medical history of Anxiety, Arthritis, Back pain, Bronchitis, Caffeine use, Carpal tunnel syndrome, Cataracts, bilateral, Constipation, Depression, Diarrhea, GERD (gastroesophageal reflux disease), H/O gastric ulcer, Hyperlipidemia, Hypertension, Mumps, MVP (mitral valve prolapse), Recurrent UTI, Sinusitis, Tracheostomy in place [facility]), Ulcerative esophagitis, and Wellness examination.    has a past surgical Activity  Response to Pain Intervention: Patient Satisfied  Vital Signs  Patient Currently in Pain: Yes       Orientation  Orientation  Overall Orientation Status: Within Functional Limits  Social/Functional History  Social/Functional History  Lives With: Other (comment)  Type of Home: Facility([place])  Home Layout: One level  Home Access: Level entry  Bathroom Shower/Tub: Walk-in shower, Shower chair with back  Bathroom Toilet: Handicap height  Bathroom Equipment: Grab bars in shower, Grab bars around toilet  Home Equipment: 4 wheeled walker, Standard walker, Wheelchair-manual(Reports using w/c for most ambulation recently)  ADL Assistance: Needs assistance  Bath: Moderate assistance  Dressing: Moderate assistance  Toileting: Needs assistance(clothing mgmt only)  Homemaking Responsibilities: No(Facility provides 3 meals per day.)  Ambulation Assistance: Needs assistance(able to propel w/c independently)  Transfer Assistance: Independent(reports getting assistance for supervision)  Active : No  Leisure & Hobbies: iPad, puzzle books,  Cognition   Cognition  Overall Cognitive Status: Exceptions  Following Commands: Follows multistep commands with increased time; Follows multistep commands with repitition  Initiation: Requires cues for some  Sequencing: Requires cues for some    Objective          Joint Mobility  Spine: WFL  ROM RLE: WFL, ankle DF to neutral  ROM LLE: WFL  ROM RUE: WFL  ROM LUE: WFL  Strength RLE  Strength RLE: WFL  Comment: Grossly 4-/5  Strength LLE  Strength LLE: WFL  Comment: Grossly 4-/5  Strength RUE  Strength RUE: WFL  Comment: PT/OT co-eval, see OT assessment for detail  Strength LUE  Strength LUE: WFL  Comment: PT/OT co-eval, see OT assessment for detail  Tone RLE  RLE Tone: Normotonic  Tone LLE  LLE Tone: Normotonic  Sensation  Overall Sensation Status: Impaired(Pt reports acute tingling in L foot)  Bed mobility  Supine to Sit: Minimal assistance  Sit to Supine: Minimal assistance  Scooting: Contact guard assistance  Comment: Pt requiring mod verbal cueing for progression of trunk throughout bed mobility. Pt requiring use of bedrails for assist.  Transfers  Sit to Stand: Contact guard assistance  Stand to sit: Contact guard assistance  Ambulation  Ambulation?: Yes  Ambulation 1  Surface: level tile  Device: Rolling Walker  Assistance: Contact guard assistance  Gait Deviations: Slow Jaleesa;Decreased step length;Decreased step height;Decreased head and trunk rotation  Distance: 10ft  Comments: Pt demonstrating forward trunk lean with ambulation requiring mod verbal cueing to correct with fair return demo. Pt lacking heelstrike during gait and tending to toe-walk. Pt demonstrating good progression of RW independently throughout ambulation.   Stairs/Curb  Stairs?: No     Balance  Posture: Poor  Sitting - Static: Good;-  Sitting - Dynamic: Fair;+  Standing - Static: Fair;+  Standing - Dynamic: Fair;+  Comments: Standing balance assessed with RW        Plan   Plan  Times per week: 5-6x / week  Current Treatment Recommendations: Strengthening, Home Exercise Program, Safety Education & Training, Patient/Caregiver Education & Training, Equipment Evaluation, Education, & procurement, ROM, Balance Training, Endurance Training, Functional Mobility Training, Transfer Training, Gait Training, Stair training  Safety Devices  Type of devices: Call light within reach, Gait belt, Left in bed, Nurse notified, Bed alarm in place(RN and respiratory therapy in pt's room upon writer's exit)  Restraints  Initially in place: No      AM-PAC Score     AM-PAC Inpatient Mobility without Stair Climbing Raw Score : 16 (02/08/21 1129)  AM-PAC Inpatient without Stair Climbing T-Scale Score : 45.54 (02/08/21 1129)  Mobility Inpatient CMS 0-100% Score: 40.64 (02/08/21 1129)  Mobility Inpatient without Stair CMS G-Code Modifier : CK (02/08/21 1129)       Goals  Short term goals  Time Frame for Short term goals: 14 visits  Short term goal 1: Pt to demonstrate bed mobility and functional transfers independently  Short term goal 2: Ambulate 50ft with RW SBA  Short term goal 3: Pt to actively participate in at least 30 minutes of physical therapy to demonstrate increased endurance  Short term goal 4: Pt to demonstrate at least good- dynamic standing balance to decrease fall risk  Patient Goals   Patient goals : To get out of hospital       Therapy Time   Individual Concurrent Group Co-treatment   Time In 0920         Time Out 0957         Minutes 37         Timed Code Treatment Minutes: 700 Giesler performed by Student PT under the supervision of co-signing PT who agrees with all evaluation/treatment and documentation.

## 2021-02-09 LAB
ABSOLUTE EOS #: 0.05 K/UL (ref 0–0.44)
ABSOLUTE IMMATURE GRANULOCYTE: 0.19 K/UL (ref 0–0.3)
ABSOLUTE LYMPH #: 0.43 K/UL (ref 1.1–3.7)
ABSOLUTE MONO #: 0.38 K/UL (ref 0.1–1.2)
ANION GAP SERPL CALCULATED.3IONS-SCNC: 11 MMOL/L (ref 9–17)
BASOPHILS # BLD: 0 % (ref 0–2)
BASOPHILS ABSOLUTE: 0 K/UL (ref 0–0.2)
BUN BLDV-MCNC: 10 MG/DL (ref 8–23)
BUN/CREAT BLD: ABNORMAL (ref 9–20)
CALCIUM SERPL-MCNC: 9.3 MG/DL (ref 8.6–10.4)
CHLORIDE BLD-SCNC: 103 MMOL/L (ref 98–107)
CO2: 24 MMOL/L (ref 20–31)
CREAT SERPL-MCNC: 0.93 MG/DL (ref 0.5–0.9)
DIFFERENTIAL TYPE: ABNORMAL
EOSINOPHILS RELATIVE PERCENT: 1 % (ref 1–4)
GFR AFRICAN AMERICAN: >60 ML/MIN
GFR NON-AFRICAN AMERICAN: 59 ML/MIN
GFR SERPL CREATININE-BSD FRML MDRD: ABNORMAL ML/MIN/{1.73_M2}
GFR SERPL CREATININE-BSD FRML MDRD: ABNORMAL ML/MIN/{1.73_M2}
GLUCOSE BLD-MCNC: 97 MG/DL (ref 70–99)
HCT VFR BLD CALC: 35 % (ref 36.3–47.1)
HEMOGLOBIN: 10.8 G/DL (ref 11.9–15.1)
IMMATURE GRANULOCYTES: 4 %
LYMPHOCYTES # BLD: 9 % (ref 24–43)
MCH RBC QN AUTO: 26.7 PG (ref 25.2–33.5)
MCHC RBC AUTO-ENTMCNC: 30.9 G/DL (ref 28.4–34.8)
MCV RBC AUTO: 86.4 FL (ref 82.6–102.9)
MONOCYTES # BLD: 8 % (ref 3–12)
MORPHOLOGY: ABNORMAL
NRBC AUTOMATED: 0 PER 100 WBC
PDW BLD-RTO: 15.2 % (ref 11.8–14.4)
PLATELET # BLD: 196 K/UL (ref 138–453)
PLATELET ESTIMATE: ABNORMAL
PMV BLD AUTO: 10.8 FL (ref 8.1–13.5)
POTASSIUM SERPL-SCNC: 4 MMOL/L (ref 3.7–5.3)
RBC # BLD: 4.05 M/UL (ref 3.95–5.11)
RBC # BLD: ABNORMAL 10*6/UL
SEG NEUTROPHILS: 78 % (ref 36–65)
SEGMENTED NEUTROPHILS ABSOLUTE COUNT: 3.75 K/UL (ref 1.5–8.1)
SODIUM BLD-SCNC: 138 MMOL/L (ref 135–144)
WBC # BLD: 4.8 K/UL (ref 3.5–11.3)
WBC # BLD: ABNORMAL 10*3/UL

## 2021-02-09 PROCEDURE — 36415 COLL VENOUS BLD VENIPUNCTURE: CPT

## 2021-02-09 PROCEDURE — 6370000000 HC RX 637 (ALT 250 FOR IP): Performed by: STUDENT IN AN ORGANIZED HEALTH CARE EDUCATION/TRAINING PROGRAM

## 2021-02-09 PROCEDURE — 99232 SBSQ HOSP IP/OBS MODERATE 35: CPT | Performed by: INTERNAL MEDICINE

## 2021-02-09 PROCEDURE — 6360000002 HC RX W HCPCS: Performed by: INTERNAL MEDICINE

## 2021-02-09 PROCEDURE — 6360000002 HC RX W HCPCS: Performed by: STUDENT IN AN ORGANIZED HEALTH CARE EDUCATION/TRAINING PROGRAM

## 2021-02-09 PROCEDURE — 2580000003 HC RX 258: Performed by: INTERNAL MEDICINE

## 2021-02-09 PROCEDURE — 6370000000 HC RX 637 (ALT 250 FOR IP): Performed by: INTERNAL MEDICINE

## 2021-02-09 PROCEDURE — 85025 COMPLETE CBC W/AUTO DIFF WBC: CPT

## 2021-02-09 PROCEDURE — 97116 GAIT TRAINING THERAPY: CPT

## 2021-02-09 PROCEDURE — 97110 THERAPEUTIC EXERCISES: CPT

## 2021-02-09 PROCEDURE — 1200000000 HC SEMI PRIVATE

## 2021-02-09 PROCEDURE — 80048 BASIC METABOLIC PNL TOTAL CA: CPT

## 2021-02-09 RX ORDER — HYDROCODONE BITARTRATE AND ACETAMINOPHEN 5; 325 MG/1; MG/1
1 TABLET ORAL EVERY 8 HOURS PRN
Status: DISCONTINUED | OUTPATIENT
Start: 2021-02-09 | End: 2021-02-11 | Stop reason: HOSPADM

## 2021-02-09 RX ORDER — HEPARIN SODIUM 5000 [USP'U]/ML
5000 INJECTION, SOLUTION INTRAVENOUS; SUBCUTANEOUS EVERY 8 HOURS SCHEDULED
Status: DISCONTINUED | OUTPATIENT
Start: 2021-02-09 | End: 2021-02-11 | Stop reason: HOSPADM

## 2021-02-09 RX ORDER — PHENAZOPYRIDINE HYDROCHLORIDE 100 MG/1
200 TABLET, FILM COATED ORAL
Status: DISCONTINUED | OUTPATIENT
Start: 2021-02-09 | End: 2021-02-11 | Stop reason: HOSPADM

## 2021-02-09 RX ADMIN — HEPARIN SODIUM 5000 UNITS: 5000 INJECTION INTRAVENOUS; SUBCUTANEOUS at 21:36

## 2021-02-09 RX ADMIN — BUSPIRONE HYDROCHLORIDE 20 MG: 10 TABLET ORAL at 08:15

## 2021-02-09 RX ADMIN — CALCIUM CARBONATE 500 MG: 500 TABLET, CHEWABLE ORAL at 00:07

## 2021-02-09 RX ADMIN — CALCIUM CARBONATE 500 MG: 500 TABLET, CHEWABLE ORAL at 13:11

## 2021-02-09 RX ADMIN — ACETAMINOPHEN 650 MG: 325 TABLET ORAL at 16:25

## 2021-02-09 RX ADMIN — BUSPIRONE HYDROCHLORIDE 20 MG: 10 TABLET ORAL at 13:59

## 2021-02-09 RX ADMIN — AMITRIPTYLINE HYDROCHLORIDE 25 MG: 25 TABLET, FILM COATED ORAL at 20:36

## 2021-02-09 RX ADMIN — HYDROCODONE BITARTRATE AND ACETAMINOPHEN 1 TABLET: 5; 325 TABLET ORAL at 23:08

## 2021-02-09 RX ADMIN — BUSPIRONE HYDROCHLORIDE 20 MG: 10 TABLET ORAL at 20:36

## 2021-02-09 RX ADMIN — CLONAZEPAM 0.25 MG: 0.5 TABLET ORAL at 20:36

## 2021-02-09 RX ADMIN — TRAZODONE HYDROCHLORIDE 100 MG: 100 TABLET ORAL at 20:36

## 2021-02-09 RX ADMIN — PHENAZOPYRIDINE HYDROCHLORIDE 200 MG: 100 TABLET ORAL at 16:54

## 2021-02-09 RX ADMIN — MEROPENEM 1000 MG: 1 INJECTION, POWDER, FOR SOLUTION INTRAVENOUS at 03:45

## 2021-02-09 RX ADMIN — ONDANSETRON 4 MG: 4 TABLET, ORALLY DISINTEGRATING ORAL at 16:25

## 2021-02-09 RX ADMIN — QUETIAPINE FUMARATE 25 MG: 25 TABLET ORAL at 20:36

## 2021-02-09 RX ADMIN — QUETIAPINE FUMARATE 25 MG: 25 TABLET ORAL at 08:14

## 2021-02-09 RX ADMIN — MEROPENEM 1000 MG: 1 INJECTION, POWDER, FOR SOLUTION INTRAVENOUS at 18:45

## 2021-02-09 RX ADMIN — ONDANSETRON 4 MG: 2 INJECTION INTRAMUSCULAR; INTRAVENOUS at 16:53

## 2021-02-09 RX ADMIN — MEROPENEM 1000 MG: 1 INJECTION, POWDER, FOR SOLUTION INTRAVENOUS at 11:20

## 2021-02-09 RX ADMIN — HEPARIN SODIUM 5000 UNITS: 5000 INJECTION INTRAVENOUS; SUBCUTANEOUS at 15:30

## 2021-02-09 RX ADMIN — CLONAZEPAM 0.25 MG: 0.5 TABLET ORAL at 08:15

## 2021-02-09 RX ADMIN — HYDROCODONE BITARTRATE AND ACETAMINOPHEN 1 TABLET: 5; 325 TABLET ORAL at 15:00

## 2021-02-09 RX ADMIN — CALCIUM CARBONATE 500 MG: 500 TABLET, CHEWABLE ORAL at 23:08

## 2021-02-09 RX ADMIN — PANTOPRAZOLE SODIUM 40 MG: 40 TABLET, DELAYED RELEASE ORAL at 08:14

## 2021-02-09 RX ADMIN — SODIUM CHLORIDE, PRESERVATIVE FREE 10 ML: 5 INJECTION INTRAVENOUS at 08:17

## 2021-02-09 RX ADMIN — ACETAMINOPHEN 650 MG: 325 TABLET ORAL at 08:15

## 2021-02-09 RX ADMIN — SODIUM CHLORIDE, PRESERVATIVE FREE 10 ML: 5 INJECTION INTRAVENOUS at 20:36

## 2021-02-09 ASSESSMENT — PAIN SCALES - GENERAL
PAINLEVEL_OUTOF10: 0
PAINLEVEL_OUTOF10: 8
PAINLEVEL_OUTOF10: 0
PAINLEVEL_OUTOF10: 8
PAINLEVEL_OUTOF10: 0
PAINLEVEL_OUTOF10: 6
PAINLEVEL_OUTOF10: 4

## 2021-02-09 NOTE — PROGRESS NOTES
Physician Progress Note      Gal Haley  CSN #:                  [id number]  :                       1945  ADMIT DATE:       2021 7:27 AM  DISCH DATE:  RESPONDING  PROVIDER #:        Edwina Caldera MD          QUERY TEXT:    Pt admitted with hematemesis and has acute on chronic anemia documented. If   possible, please document in progress notes and discharge summary further   specificity regarding the acuity and type of anemia:    The medical record reflects the following:  Risk Factors: Hematemesis  Clinical Indicators:  Hgb 11.5   Hgb 9.8  Treatment: Lab monitoring, GI consult    Thank you, Franklin Blanchard RN, CDS. Please call with any questions [phone]    M-F 6a-2:30p  Options provided:  -- Anemia due to acute on chronic blood loss  -- Anemia due to acute blood loss and iron deficiency anemia  -- Anemia due to acute on chronic blood loss and dilution  -- Other - I will add my own diagnosis  -- Disagree - Not applicable / Not valid  -- Disagree - Clinically unable to determine / Unknown  -- Refer to Clinical Documentation Reviewer    PROVIDER RESPONSE TEXT:    This patient has anemia due to a combination of acute blood loss and iron   deficiency anemia.     Query created by: Mami Colmenares on 2021 1:39 PM      Electronically signed by:  Edwina Caldera MD 2021 9:50 AM

## 2021-02-09 NOTE — PROGRESS NOTES
THE [facility] [place] [facility]   Progress Note    Doneta Boxer is a 76 y.o. female patient. Hospitalization Day:2      Chief consult reason:     Hematemesis, removal of PEG tube. Subjective:     Patient examined at bedside. Overnight patient had a spike of fever 101. Patient reports of pain and bloody oozing at the site of PEG tube removal.  Also reports of burning sensation at epigastrium associated with nausea. Denies vomiting, hematemesis, melena, hematochezia. VITALS:  /69   Pulse 98   Temp 101 °F (38.3 °C) (Tympanic)   Resp 14   Ht 5' 2\" (1.575 m)   Wt 185 lb 10 oz (84.2 kg)   SpO2 96%   BMI 33.95 kg/m²   TEMPERATURE:  Current - Temp: 101 °F (38.3 °C); Max - Temp  Av.8 °F (37.1 °C)  Min: 97.8 °F (36.6 °C)  Max: 101 °F (38.3 °C)    Physical Assessment:  General appearance:  alert, cooperative and no distress  Mental Status:  oriented to person, place and time and normal affect  Lungs:  clear to auscultation bilaterally, normal effort  Heart:  regular rate and rhythm, no murmur  Abdomen:  soft, tender at epigastrium and RHQ, nondistended, normal bowel sounds, no masses, hepatomegaly, splenomegaly  Extremities:  no edema, redness, tenderness in the calves  Skin:  no gross lesions, rashes, induration    Data Review:    Labs and Imaging:     CBC:  Recent Labs     21  0745 21  0402 21  0425   WBC 12.3* 5.6 4.8   HGB 11.5* 9.8* 10.8*   MCV 86.9 88.3 86.4   RDW 15.9* 15.7* 15.2*    194 196       ANEMIA STUDIES:  No results for input(s): LABIRON, TIBC, FERRITIN, [id number], FOLATE, OCCULTBLD in the last 72 hours.     BMP:  Recent Labs     21  0745 21  0402 21  0425    141 138   K 3.6* 3.8 4.0    108* 103   CO2 23 23 24   BUN 30* 17 10   CREATININE 1.09* 0.96* 0.93*   GLUCOSE 108* 95 97   CALCIUM 9.1 8.4* 9.3       LFTS:  Recent Labs     21  0745   ALKPHOS 130*   ALT 10   AST 10   BILITOT 0.24*   LABALBU 3.5       Amylase/Lipase and Ammonia:  Recent Labs     02/07/21  0745   LIPASE 8*       Acute Hepatitis Panel:  Lab Results   Component Value Date    HEPBSAG NONREACTIVE 05/16/2020    HEPCAB NONREACTIVE 05/16/2020    HEPBIGM NONREACTIVE 05/16/2020    HEPAIGM NONREACTIVE 05/16/2020       HCV Genotype:  No results found for: HEPATITISCGENOTYPE    HCV Quantitative:  No results found for: HCVQNT    LIVER WORK UP:    AFP  No results found for: AFP    Alpha 1 antitrypsin   No results found for: A1A    TOÑO  Lab Results   Component Value Date    TOÑO NEGATIVE 05/16/2020       AMA  Lab Results   Component Value Date    MITOAB 9.7 05/16/2020       ASMA  Lab Results   Component Value Date    SMOOTHMUSCAB 15 05/16/2020       PT/INR  No results for input(s): PROTIME, INR in the last 72 hours. Cancer Markers:  CEA:  No results for input(s): CEA in the last 72 hours. Ca 125:  No results for input(s):  in the last 72 hours. Ca 19-9:   Invalid input(s):   AFP: No results for input(s): AFP in the last 72 hours. Lactic acid:Invalid input(s): LACTIC ACID    Radiology Review:    No results found. Principal Problem:    Coffee ground emesis  Active Problems:    BENEDICT (acute kidney injury) ([address]    Recurrent UTI    S/P percutaneous endoscopic gastrostomy (PEG) tube placement (HCC)    Acute cystitis without hematuria    Ulcerative esophagitis  Resolved Problems:    * No resolved hospital problems. *       GI Impression:    77-year-old female with history of ulcerative esophagitis, gastritis, GERD, large hiatal hernia s/p robotic repair with fundoplication 89/5955, hypoxic respiratory failure  S/p PEG/trach 2020, achalasia, cholecystectomy 5/2020 and ERCP with stent  For CBD obstruction,  depression and anxiety who presented to the hospital from [facility] with reports of hematemesis. She is s/p EGD and ERCP with stent removal and sphincterotomy    1.  Hematemesis -suspect secondary to her history of ulcerative esophagitis vs gastritis vs Kirill erosions vs PEG tube bumper ulcer  2. Hx of CBD dilation  S/p stent placement 05/2020 -stent was scheduled for removal 2/10/2021 - By Dr Rebecca Padgett. LFTs fairly unremarkable without signs of biliary obstruction  3. Anemia. - Likely acute on chronic. Hemoglobin 10.8 g/dL  4. Grade D esophagitis        Plan and Recommendations:    1. Continue Protonix twice daily lifelong  2. On low-fat diet. 3. Pain management as per primary  4. GI will sign off. This plan was formulated in collaboration with Dr. Chino Wynne MD    Thank you for allowing me to participate in the care of your patient. Please feel free to contact me with any questions or concerns.      Cj Roman, [address] [facility]

## 2021-02-09 NOTE — PROGRESS NOTES
Physical Therapy  Facility/Department: [address] STEPDOWN  Daily Treatment Note  NAME: Daiana Thompson  : 1945  MRN: [id number]    Date of Service: 2021    Discharge Recommendations:  Patient would benefit from continued therapy after discharge        Assessment   Body structures, Functions, Activity limitations: Decreased functional mobility ; Decreased sensation; Increased pain;Decreased ADL status; Decreased balance;Decreased posture;Decreased ROM; Decreased strength;Decreased high-level IADLs;Decreased safe awareness;Decreased coordination;Decreased endurance  Assessment: Complaining of 8/10 pain in abdomen and knees. Medicated with her ordered Tylenol this AM prior to treatment. Only able to ambulate 10' as she did yesterday. Needs further PT to regain independence. PT Education: Goals; General Safety;Gait Training;PT Role;Plan of Care; Functional Mobility Training;Transfer Training;Home Exercise Program  REQUIRES PT FOLLOW UP: Yes  Activity Tolerance  Activity Tolerance: Patient limited by fatigue;Patient limited by endurance     Patient Diagnosis(es): The primary encounter diagnosis was Elevated troponin. Diagnoses of Abdominal pain, epigastric and Coffee ground emesis were also pertinent to this visit. has a past medical history of Anxiety, Arthritis, Back pain, Bronchitis, Caffeine use, Carpal tunnel syndrome, Cataracts, bilateral, Constipation, Depression, Diarrhea, GERD (gastroesophageal reflux disease), H/O gastric ulcer, Hyperlipidemia, Hypertension, Mumps, MVP (mitral valve prolapse), Recurrent UTI, Sinusitis, Tracheostomy in place [facility]), Ulcerative esophagitis, and Wellness examination. has a past surgical history that includes Hysterectomy; total nephrectomy; Tonsillectomy; Appendectomy; Cystoscopy; Ovary removal; Tubal ligation; rhinoplasty; Carpal tunnel release; Hand surgery; Total hip arthroplasty; eye surgery;  Colonoscopy; joint replacement (Right); Gastric fundoplication (N/A, 02/24/2020);  cath power picc triple (03/04/2020); Upper gastrointestinal endoscopy (N/A, 03/17/2020); tracheostomy (N/A, 03/27/2020); Gastrostomy tube placement (N/A, 03/27/2020); tracheostomy (N/A, 04/02/2020); Upper gastrointestinal endoscopy (N/A, 05/18/2020); Upper gastrointestinal endoscopy (05/18/2020); ERCP (05/21/2020); ERCP (05/21/2020); ERCP (05/21/2020); ERCP (05/21/2020); Cholecystectomy, laparoscopic (N/A, 05/22/2020); Upper gastrointestinal endoscopy (N/A, 07/06/2020); Upper gastrointestinal endoscopy (N/A, 11/09/2020); Upper gastrointestinal endoscopy (02/08/2021); Gastrostomy tube placement (N/A, 2/8/2021); ERCP (N/A, 2/8/2021); and ERCP (2/8/2021). Restrictions  Restrictions/Precautions  Restrictions/Precautions: Up as Tolerated  Required Braces or Orthoses?: No  Position Activity Restriction  Other position/activity restrictions: up with assistance  Subjective   General  Chart Reviewed: Yes  Family / Caregiver Present: No  Subjective  Subjective: RN and pt agreeble to therapy. Pt supine in bed upon arrival.  Pt pleasant and cooperative throughout. Pain Screening  Patient Currently in Pain: Yes  Pain Assessment  Pain Assessment: 0-10  Pain Level: 8  Pain Location: Abdomen;Knee  Vital Signs  Pulse: 98  Patient Currently in Pain: Yes            Cognition   Cognition  Overall Cognitive Status: Exceptions  Following Commands:  Follows one step commands consistently  Attention Span: Appears intact  Memory: Appears intact  Problem Solving: Assistance required to generate solutions  Initiation: Requires cues for some  Sequencing: Requires cues for some  Objective   Bed mobility  Supine to Sit: Minimal assistance  Sit to Supine: Contact guard assistance  Transfers  Sit to Stand: Minimal Assistance  Stand to sit: Contact guard assistance  Ambulation  Ambulation?: Yes  Ambulation 1  Surface: level tile  Device: Rolling Walker  Assistance: Contact guard assistance  Gait Deviations: Slow Jaleesa;Decreased step length;Decreased step height  Distance: 10ft  Comments: Complains of 8/10 pain in abdomen and knees. Balance  Standing - Static: Fair;-  Standing - Dynamic: Poor;+  Exercises  Heelslides: x10  Hip Abduction: x10  Ankle Pumps: x15                  Goals  Short term goals  Time Frame for Short term goals: 14 visits  Short term goal 1: Pt to demonstrate bed mobility and functional transfers independently  Short term goal 2: Ambulate 50ft with RW SBA  Short term goal 3: Pt to actively participate in at least 30 minutes of physical therapy to demonstrate increased endurance  Short term goal 4: Pt to demonstrate at least good- dynamic standing balance to decrease fall risk  Patient Goals   Patient goals :  To get out of hospital    Plan    Plan  Times per week: 5-6x / week  Current Treatment Recommendations: Strengthening, Home Exercise Program, Safety Education & Training, Patient/Caregiver Education & Training, Equipment Evaluation, Education, & procurement, ROM, Balance Training, Endurance Training, Functional Mobility Training, Transfer Training, Gait Training  Safety Devices  Type of devices: Call light within reach, Gait belt, Left in bed, Nurse notified, Bed alarm in place, All fall risk precautions in place  Restraints  Initially in place: No     Therapy Time   Individual Concurrent Group Co-treatment   Time In 0918         Time Out 0944         Minutes 26         Timed Code Treatment Minutes: 32 Minutes       Sabiha Layton, PT

## 2021-02-09 NOTE — PLAN OF CARE
Problem: Skin Integrity:  Goal: Will show no infection signs and symptoms  Description: Will show no infection signs and symptoms  2/8/2021 2255 by Rodrick Jean RN  Outcome: Ongoing     Problem: Skin Integrity:  Goal: Absence of new skin breakdown  Description: Absence of new skin breakdown  Outcome: Ongoing     Problem: Falls - Risk of:  Goal: Will remain free from falls  Description: Will remain free from falls  2/8/2021 2255 by Rodrick Jean RN  Outcome: Ongoing     Problem: Falls - Risk of:  Goal: Absence of physical injury  Description: Absence of physical injury  Outcome: Ongoing     Problem: Pain:  Goal: Pain level will decrease  Description: Pain level will decrease  2/8/2021 2255 by Rodrick Jean RN  Outcome: Ongoing     Problem: Pain:  Goal: Control of acute pain  Description: Control of acute pain  Outcome: Ongoing     Problem: Pain:  Goal: Control of chronic pain  Description: Control of chronic pain  Outcome: Ongoing

## 2021-02-09 NOTE — PROGRESS NOTES
[facility]  Internal Medicine Teaching Residency Program  Inpatient Daily Progress Note  ______________________________________________________________________________    Patient: Courtney Marcos  YOB: 1945   WUV:[id number]    Acct: [de-identified]     Room: [address] [place] [address]  Admit date: 2/7/2021  Today's date: 02/09/21  Number of days in the hospital: 2    SUBJECTIVE   Admitting Diagnosis: Coffee ground emesis  CC: Nausea , vomiting, abdominal pain  Pt examined at bedside. Chart & results reviewed. Patient is hemodynamically stable. Overnight no acute events. Patient does report mild pain at PEG removal site. Patient had EGD done yesterday which showed grade D reflux esophagitis, PEG tube removed. ERCP done with stent removal, cholangiogram and balloon extraction of stones and sludge. Patient is currently on meropenem. Blood cultures showed nonlactose fermenting gram-negative rods greater than 10,000. Awaiting sensitivities. Creatinine improved  Leukocytosis improved.    Urine cultures   Hemoglobin 10.8    ROS:  Constitutional:  negative for chills, fevers, sweats  Respiratory:  negative for cough, dyspnea on exertion, hemoptysis, shortness of breath, wheezing  Cardiovascular:  negative for chest pain, chest pressure/discomfort, lower extremity edema, palpitations  Gastrointestinal:  negative for abdominal pain, constipation, diarrhea, nausea, vomiting  Neurological:  negative for dizziness, headache  BRIEF HISTORY   The patient is a pleasant 76 y.o. female the past medical history history of ulcerative esophagitis, gastritis, GERD, large hiatal hernia s/p robotic repair with fundoplication 02/2020, hypoxic respiratory failure  S/p PEG/trach 2020, achalasia, cholecystectomy 5/2020 and ERCP with stent  For CBD obstruction,  depression and anxiety  resistent UTI, anxiety, PEG tube, presents with a chief complaint of nausea, vomiting x 3-4, since yesterday. Associated with coffee-ground emesis. Denies seeing bright red blood in the vomit. Also associated with dull, nonradiating epigastric abdominal pain. Patient presents from [facility]. Patient does report burning micturition since 1 week. Associated with frequency. No change in bowel habits.  Patient reports she has not used her PEG tube for 1 year or greater and has been taking all food orally.     On my examination, patient's blood pressure is soft. Blood pressure 104/49. Temperature afebrile, pulse in the 80s. Saturating at 95% on room air, respiratory rate 17. Awake, alert, oriented x3. Patient does appear anxious. Has a PEG tube in place, draining biliary fluid. Patient has epigastric abdominal tenderness, no rebound tenderness, no organomegaly. Clear to auscultation bilaterally, S1-S2 heard normal, no murmurs heard. No bilateral pedal edema.     Labs reviewed. Low potassium 3.6, creatinine elevated 1.09 (baseline creatinine 0.5-0.6), lactic acid not elevated 1.7, glucose 108, troponin 49> 45. Alk phos 130, leukocytosis WBC 12.3, hemoglobin 11.5. Urinalysis shows positive nitrate, moderate leukocyte esterase, many bacteria.     Chest x-ray revealed  1. Streaky linear opacities in the left lung base may represent subsegmental   atelectasis versus inflammatory process.       2. No evidence for free air in the upper abdomen.       3.  Nonobstructive bowel gas pattern.  Mild stool burden.            Of note, Patient underwent EGD 11/9/2020 showing severe esophagitis, large hiatal hernia but no Kirill erosions. Patient also was seen by GI 05/2020 for dilated CBD, possible stone on imaging.  Patient underwent ERCP with stent placement.  Patient was scheduled 2/10/2021 with Dr Angeline Craig for EGD/ERCP with stent removal.  Patient was last admitted to hospital in December 2020 for management of sepsis likely secondary to UTI. Had Klebsiella and ESBL UTI.   Was treated with meropenem. Also had expressive aphasia. Neurology was consulted. MRI normal, EEG showing left frontal slowing. Recommended 2 weeks Holter monitoring. And was discharged home.     In the ER patient received two 500 mL boluses of normal saline, fentanyl 50 mg IV once, haldol IM 2 mg once. Zofran 4mg IV once. Started on protonix drip. OBJECTIVE     Vital Signs:  /69   Pulse 98   Temp 101 °F (38.3 °C) (Tympanic)   Resp 14   Ht 5' 2\" (1.575 m)   Wt 185 lb 10 oz (84.2 kg)   SpO2 96%   BMI 33.95 kg/m²     Temp (24hrs), Av.7 °F (37.1 °C), Min:97.5 °F (36.4 °C), Max:101 °F (38.3 °C)    In: 3546   Out: 1700 [Urine:1700]    Physical Exam:   Constitutional: This is a well developed, well nourished, 30-34.9 - Obesity Grade I 76y.o. year old female who is alert, oriented, cooperative and in no apparent distress. Head:normocephalic and atraumatic. EENT:  PERRLA. No conjunctival injections. Septum was midline, mucosa was without erythema, exudates or cobblestoning. No thrush was noted. Neck: Supple without thyromegaly. No elevated JVP. Trachea was midline. Respiratory: Chest was symmetrical without dullness to percussion. Breath sounds bilaterally were clear to auscultation. There were no wheezes, rhonchi or rales. There is no intercostal retraction or use of accessory muscles. No egophony noted. Cardiovascular: Regular without murmur, clicks, gallops or rubs. Abdomen: Epigastric abdominal tenderness +, no rebound tenderness. Peg Tube removed. Dressing in place. Lymphatic: No lymphadenopathy. Musculoskeletal: Normal curvature of the spine. No gross muscle weakness. Extremities:  No lower extremity edema, ulcerations, tenderness, varicosities or erythema. Muscle size, tone and strength are normal.  No involuntary movements are noted. Skin:  Warm and dry. Good color, turgor and pigmentation. No lesions or scars.   No cyanosis or clubbing  Neurological/Psychiatric: The patient's general behavior, level of consciousness, thought content and emotional status is normal.         Medications:  Scheduled Medications:    pantoprazole  40 mg Oral QAM AC    sodium chloride flush  10 mL Intravenous 2 times per day    meropenem  1,000 mg Intravenous Q8H    amitriptyline  25 mg Oral Nightly    busPIRone  20 mg Oral TID    QUEtiapine  25 mg Oral BID    traZODone  100 mg Oral Nightly    clonazePAM  0.25 mg Oral BID     Continuous Infusions:    sodium chloride 50 mL/hr at 02/09/21 0826     PRN Medications    polyvinyl alcohol, 1 drop, Q2H PRN      meperidine, 12.5 mg, Q5 Min PRN      fentanNYL, 25 mcg, Q5 Min PRN      fentanNYL, 50 mcg, Q5 Min PRN      sodium chloride flush, 10 mL, PRN      magnesium sulfate, 1,000 mg, PRN      polyethylene glycol, 17 g, Daily PRN      acetaminophen, 650 mg, Q6H PRN    Or      acetaminophen, 650 mg, Q6H PRN      potassium chloride, 10 mEq, PRN      ondansetron, 4 mg, Q8H PRN    Or      ondansetron, 4 mg, Q6H PRN      potassium chloride, 40 mEq, PRN    Or      potassium alternative oral replacement, 40 mEq, PRN    Or      potassium chloride, 10 mEq, PRN      calcium carbonate, 500 mg, TID PRN        Diagnostic Labs:  CBC:   Recent Labs     02/07/21  0745 02/08/21  0402 02/09/21  0425   WBC 12.3* 5.6 4.8   RBC 4.29 3.68* 4.05   HGB 11.5* 9.8* 10.8*   HCT 37.3 32.5* 35.0*   MCV 86.9 88.3 86.4   RDW 15.9* 15.7* 15.2*    194 196     BMP:   Recent Labs     02/07/21  0745 02/08/21  0402 02/09/21  0425    141 138   K 3.6* 3.8 4.0    108* 103   CO2 23 23 24   BUN 30* 17 10   CREATININE 1.09* 0.96* 0.93*     BNP: No results for input(s): BNP in the last 72 hours. PT/INR: No results for input(s): PROTIME, INR in the last 72 hours. APTT: No results for input(s): APTT in the last 72 hours. CARDIAC ENZYMES: No results for input(s): CKMB, CKMBINDEX, TROPONINI in the last 72 hours.     Invalid input(s): CKTOTAL;3  FASTING LIPID PANEL:  Lab Results   Component Value Date    CHOL 203 (H) 12/14/2020    HDL 43 12/14/2020    TRIG 268 (H) 12/14/2020     LIVER PROFILE:   Recent Labs     02/07/21  0745   AST 10   ALT 10   BILITOT 0.24*   ALKPHOS 130*      MICROBIOLOGY:   Lab Results   Component Value Date/Time    CULTURE (A) 02/07/2021 11:09 PM     NON LACTOSE FERMENTING GRAM NEGATIVE RODS >[id number] CFU/ML       Imaging:    Xr Abdomen (kub) (single Ap View)    Result Date: 2/7/2021  1. Streaky linear opacities in the left lung base may represent subsegmental atelectasis versus inflammatory process. 2. No evidence for free air in the upper abdomen. 3.  Nonobstructive bowel gas pattern. Mild stool burden. Xr Chest Portable    Result Date: 2/7/2021  1. Streaky linear opacities in the left lung base may represent subsegmental atelectasis versus inflammatory process. 2. No evidence for free air in the upper abdomen. 3.  Nonobstructive bowel gas pattern. Mild stool burden. ASSESSMENT & PLAN     ASSESSMENT / PLAN:     1. Hematemesis likely secondary to history of ulcerative esophagitis versus gastritis  -Continue normal saline at 50 mill per hour  -Consulted GI. S/p EGD 2/8/2021. AT reflux esophagitis. -Peg Tube removed  -Discontinue Protonix drip. Pantoprazole 40 mg oral daily. -Monitor H&H daily  2. BENEDICT likely pre-renal due to dehydration, nausea, vomiting  - Improving  -Start normal saline at 50 mill per hour  3. Acute cystitis with history of recurrent UTI. -s/p meropenem loading dose  -Continue meropenem IV 1 g every 8 hours daily  -Urine culture showed nonlactose fermenting gram-negative rods. Awaiting sensitivities. -order midline  -discharge on meropenem IV via midline for 7 days  4. History of CBD dilation s/p stent placement 05/2020.  -S/p ERCP with stent removal, cholangiogram and balloon extraction of stones and sludge  5. S/p PEG tube placement  -  PEG tube removed on 2/8/2020  6.  Acute on chronic anemia  -Monitor H&H daily.  Transfuse to keep hemoglobin greater than 7  -Iron deficient in July 2020. Deficiency corrected in November 2020. 7. Elevated troponins  -Trended down.  -Likely type II NSTEMI     DVT ppx: Heparin  GI ppx: Protonix  Diet:  Low-fat diet    PT/OT: Consulted  Discharge Planning / SW: Ongoing    Milad Duncan MD  Internal Medicine Resident, PGY-1  [facility]; [place], [place]  2/9/2021, 11:01 AM      Attending Physician Statement  I have discussed the care of Izabel Prabhakar with the resident team. I have examined the patient myself and taken ros and hpi , including pertinent history and exam findings,  with the resident. I have reviewed the key elements of all parts of the encounter with the resident. I agree with the assessment, plan and orders as documented by the resident. Principal Problem:    Coffee ground emesis  Active Problems:    BENEDICT (acute kidney injury) ([facility] [address]    Recurrent UTI    S/P percutaneous endoscopic gastrostomy (PEG) tube placement (HCC)    Acute cystitis without hematuria    Ulcerative esophagitis  Resolved Problems:    * No resolved hospital problems.  *        Electronically signed by Floyd Treadwell MD

## 2021-02-10 VITALS
TEMPERATURE: 98.5 F | BODY MASS INDEX: 34.32 KG/M2 | WEIGHT: 186.51 LBS | HEART RATE: 102 BPM | OXYGEN SATURATION: 98 % | HEIGHT: 62 IN | SYSTOLIC BLOOD PRESSURE: 133 MMHG | RESPIRATION RATE: 18 BRPM | DIASTOLIC BLOOD PRESSURE: 75 MMHG

## 2021-02-10 LAB
ABSOLUTE EOS #: 0.05 K/UL (ref 0–0.44)
ABSOLUTE IMMATURE GRANULOCYTE: 0.19 K/UL (ref 0–0.3)
ABSOLUTE LYMPH #: 0.48 K/UL (ref 1.1–3.7)
ABSOLUTE MONO #: 0.29 K/UL (ref 0.1–1.2)
ANION GAP SERPL CALCULATED.3IONS-SCNC: 13 MMOL/L (ref 9–17)
BASOPHILS # BLD: 1 % (ref 0–2)
BASOPHILS ABSOLUTE: 0.05 K/UL (ref 0–0.2)
BUN BLDV-MCNC: 9 MG/DL (ref 8–23)
BUN/CREAT BLD: ABNORMAL (ref 9–20)
CALCIUM SERPL-MCNC: 8.9 MG/DL (ref 8.6–10.4)
CHLORIDE BLD-SCNC: 98 MMOL/L (ref 98–107)
CO2: 21 MMOL/L (ref 20–31)
CREAT SERPL-MCNC: 0.93 MG/DL (ref 0.5–0.9)
CULTURE: ABNORMAL
DIFFERENTIAL TYPE: ABNORMAL
EOSINOPHILS RELATIVE PERCENT: 1 % (ref 1–4)
GFR AFRICAN AMERICAN: >60 ML/MIN
GFR NON-AFRICAN AMERICAN: 59 ML/MIN
GFR SERPL CREATININE-BSD FRML MDRD: ABNORMAL ML/MIN/{1.73_M2}
GFR SERPL CREATININE-BSD FRML MDRD: ABNORMAL ML/MIN/{1.73_M2}
GLUCOSE BLD-MCNC: 96 MG/DL (ref 70–99)
HCT VFR BLD CALC: 36.7 % (ref 36.3–47.1)
HEMOGLOBIN: 11.2 G/DL (ref 11.9–15.1)
IMMATURE GRANULOCYTES: 4 %
LYMPHOCYTES # BLD: 10 % (ref 24–43)
Lab: ABNORMAL
MCH RBC QN AUTO: 26.4 PG (ref 25.2–33.5)
MCHC RBC AUTO-ENTMCNC: 30.5 G/DL (ref 28.4–34.8)
MCV RBC AUTO: 86.6 FL (ref 82.6–102.9)
MONOCYTES # BLD: 6 % (ref 3–12)
MORPHOLOGY: ABNORMAL
NRBC AUTOMATED: 0 PER 100 WBC
PDW BLD-RTO: 15.4 % (ref 11.8–14.4)
PLATELET # BLD: 192 K/UL (ref 138–453)
PLATELET ESTIMATE: ABNORMAL
PMV BLD AUTO: 11 FL (ref 8.1–13.5)
POTASSIUM SERPL-SCNC: 3.7 MMOL/L (ref 3.7–5.3)
RBC # BLD: 4.24 M/UL (ref 3.95–5.11)
RBC # BLD: ABNORMAL 10*6/UL
SEG NEUTROPHILS: 78 % (ref 36–65)
SEGMENTED NEUTROPHILS ABSOLUTE COUNT: 3.74 K/UL (ref 1.5–8.1)
SODIUM BLD-SCNC: 132 MMOL/L (ref 135–144)
SPECIMEN DESCRIPTION: ABNORMAL
WBC # BLD: 4.8 K/UL (ref 3.5–11.3)
WBC # BLD: ABNORMAL 10*3/UL

## 2021-02-10 PROCEDURE — 6370000000 HC RX 637 (ALT 250 FOR IP): Performed by: INTERNAL MEDICINE

## 2021-02-10 PROCEDURE — 2580000003 HC RX 258: Performed by: INTERNAL MEDICINE

## 2021-02-10 PROCEDURE — 80048 BASIC METABOLIC PNL TOTAL CA: CPT

## 2021-02-10 PROCEDURE — 1200000000 HC SEMI PRIVATE

## 2021-02-10 PROCEDURE — 6370000000 HC RX 637 (ALT 250 FOR IP): Performed by: STUDENT IN AN ORGANIZED HEALTH CARE EDUCATION/TRAINING PROGRAM

## 2021-02-10 PROCEDURE — 6360000002 HC RX W HCPCS: Performed by: INTERNAL MEDICINE

## 2021-02-10 PROCEDURE — 36415 COLL VENOUS BLD VENIPUNCTURE: CPT

## 2021-02-10 PROCEDURE — 76937 US GUIDE VASCULAR ACCESS: CPT

## 2021-02-10 PROCEDURE — 6360000002 HC RX W HCPCS: Performed by: STUDENT IN AN ORGANIZED HEALTH CARE EDUCATION/TRAINING PROGRAM

## 2021-02-10 PROCEDURE — 99239 HOSP IP/OBS DSCHRG MGMT >30: CPT | Performed by: INTERNAL MEDICINE

## 2021-02-10 PROCEDURE — 85025 COMPLETE CBC W/AUTO DIFF WBC: CPT

## 2021-02-10 RX ORDER — PHENAZOPYRIDINE HYDROCHLORIDE 200 MG/1
200 TABLET, FILM COATED ORAL
Qty: 4 TABLET | Refills: 0 | Status: SHIPPED | OUTPATIENT
Start: 2021-02-10 | End: 2021-02-12

## 2021-02-10 RX ORDER — POLYETHYLENE GLYCOL 3350 17 G/17G
17 POWDER, FOR SOLUTION ORAL DAILY PRN
Qty: 527 G | Refills: 1 | Status: SHIPPED | OUTPATIENT
Start: 2021-02-10 | End: 2021-03-12

## 2021-02-10 RX ORDER — POLYVINYL ALCOHOL 14 MG/ML
1 SOLUTION/ DROPS OPHTHALMIC
Qty: 1 BOTTLE | Refills: 4 | Status: SHIPPED | OUTPATIENT
Start: 2021-02-10 | End: 2021-03-12

## 2021-02-10 RX ORDER — HYDROCODONE BITARTRATE AND ACETAMINOPHEN 5; 325 MG/1; MG/1
1 TABLET ORAL EVERY 8 HOURS PRN
Qty: 6 TABLET | Refills: 0 | Status: SHIPPED | OUTPATIENT
Start: 2021-02-10 | End: 2021-02-10

## 2021-02-10 RX ORDER — HYDROCODONE BITARTRATE AND ACETAMINOPHEN 5; 325 MG/1; MG/1
1 TABLET ORAL EVERY 8 HOURS PRN
Qty: 6 TABLET | Refills: 0 | Status: SHIPPED | OUTPATIENT
Start: 2021-02-10 | End: 2021-02-12

## 2021-02-10 RX ORDER — GABAPENTIN 300 MG/1
300 CAPSULE ORAL 3 TIMES DAILY
Qty: 90 CAPSULE | Refills: 0 | Status: ON HOLD | OUTPATIENT
Start: 2021-02-10 | End: 2021-11-25 | Stop reason: HOSPADM

## 2021-02-10 RX ORDER — MEROPENEM 1 G/1
1000 INJECTION, POWDER, FOR SOLUTION INTRAVENOUS EVERY 8 HOURS
Qty: 12000 MG | Refills: 0 | Status: SHIPPED | OUTPATIENT
Start: 2021-02-10 | End: 2021-02-14

## 2021-02-10 RX ADMIN — HYDROCODONE BITARTRATE AND ACETAMINOPHEN 1 TABLET: 5; 325 TABLET ORAL at 15:21

## 2021-02-10 RX ADMIN — MEROPENEM 1000 MG: 1 INJECTION, POWDER, FOR SOLUTION INTRAVENOUS at 03:30

## 2021-02-10 RX ADMIN — BUSPIRONE HYDROCHLORIDE 20 MG: 10 TABLET ORAL at 14:07

## 2021-02-10 RX ADMIN — HEPARIN SODIUM 5000 UNITS: 5000 INJECTION INTRAVENOUS; SUBCUTANEOUS at 05:10

## 2021-02-10 RX ADMIN — ONDANSETRON 4 MG: 4 TABLET, ORALLY DISINTEGRATING ORAL at 08:38

## 2021-02-10 RX ADMIN — PHENAZOPYRIDINE HYDROCHLORIDE 200 MG: 100 TABLET ORAL at 11:46

## 2021-02-10 RX ADMIN — BUSPIRONE HYDROCHLORIDE 20 MG: 10 TABLET ORAL at 20:09

## 2021-02-10 RX ADMIN — CALCIUM CARBONATE 500 MG: 500 TABLET, CHEWABLE ORAL at 12:53

## 2021-02-10 RX ADMIN — PHENAZOPYRIDINE HYDROCHLORIDE 200 MG: 100 TABLET ORAL at 16:35

## 2021-02-10 RX ADMIN — TRAZODONE HYDROCHLORIDE 100 MG: 100 TABLET ORAL at 20:09

## 2021-02-10 RX ADMIN — ACETAMINOPHEN 650 MG: 325 TABLET ORAL at 05:10

## 2021-02-10 RX ADMIN — SODIUM CHLORIDE, PRESERVATIVE FREE 10 ML: 5 INJECTION INTRAVENOUS at 08:40

## 2021-02-10 RX ADMIN — PHENAZOPYRIDINE HYDROCHLORIDE 200 MG: 100 TABLET ORAL at 08:38

## 2021-02-10 RX ADMIN — HYDROCODONE BITARTRATE AND ACETAMINOPHEN 1 TABLET: 5; 325 TABLET ORAL at 23:07

## 2021-02-10 RX ADMIN — MEROPENEM 1000 MG: 1 INJECTION, POWDER, FOR SOLUTION INTRAVENOUS at 20:03

## 2021-02-10 RX ADMIN — HEPARIN SODIUM 5000 UNITS: 5000 INJECTION INTRAVENOUS; SUBCUTANEOUS at 14:30

## 2021-02-10 RX ADMIN — ONDANSETRON 4 MG: 4 TABLET, ORALLY DISINTEGRATING ORAL at 20:18

## 2021-02-10 RX ADMIN — ACETAMINOPHEN 650 MG: 325 TABLET ORAL at 20:11

## 2021-02-10 RX ADMIN — QUETIAPINE FUMARATE 25 MG: 25 TABLET ORAL at 20:09

## 2021-02-10 RX ADMIN — QUETIAPINE FUMARATE 25 MG: 25 TABLET ORAL at 08:37

## 2021-02-10 RX ADMIN — SODIUM CHLORIDE, PRESERVATIVE FREE 10 ML: 5 INJECTION INTRAVENOUS at 20:09

## 2021-02-10 RX ADMIN — AMITRIPTYLINE HYDROCHLORIDE 25 MG: 25 TABLET, FILM COATED ORAL at 20:09

## 2021-02-10 RX ADMIN — CLONAZEPAM 0.25 MG: 0.5 TABLET ORAL at 20:08

## 2021-02-10 RX ADMIN — HEPARIN SODIUM 5000 UNITS: 5000 INJECTION INTRAVENOUS; SUBCUTANEOUS at 21:36

## 2021-02-10 RX ADMIN — PANTOPRAZOLE SODIUM 40 MG: 40 TABLET, DELAYED RELEASE ORAL at 08:37

## 2021-02-10 RX ADMIN — MEROPENEM 1000 MG: 1 INJECTION, POWDER, FOR SOLUTION INTRAVENOUS at 11:46

## 2021-02-10 RX ADMIN — BUSPIRONE HYDROCHLORIDE 20 MG: 10 TABLET ORAL at 08:37

## 2021-02-10 RX ADMIN — CLONAZEPAM 0.25 MG: 0.5 TABLET ORAL at 08:38

## 2021-02-10 ASSESSMENT — PAIN SCALES - GENERAL
PAINLEVEL_OUTOF10: 5
PAINLEVEL_OUTOF10: 3

## 2021-02-10 NOTE — CARE COORDINATION
Transitional planning.  [facility] for transportation. THey can transport at 2:30pm    [id number] admissions and left VM pt is coming at 2:30pm    1310 Pt on phone, speaking with son. Both pt and son aware of DC time. [facility] answer in admissions. Called back and spoke with Torie Greco. She will pass on to Kwabena M[place], director because their is some kind of bed crisis at the moment.  [facility] and rescheduled transport to 6:30 pm. [facility] will call me back as soon as bed situation resolved    [id number] calls back from [facility]. OK for pt to return tonight at 9:30pm    1515 Pt verbalizes understanding of new time for D/C(9:30) and will let son know.     Discharge [facility] Case Management Department  Written by: Elías Gonzalez RN    Patient Name: Luli Boone  Attending Provider: Etta Andino MD  Admit Date: 2021  7:27 AM  MRN: [id number]  Account: [de-identified]                     : 1945  Discharge Date:       Disposition: long term care facility-[facility]    Elías Gonzalez RN

## 2021-02-10 NOTE — PROGRESS NOTES
[facility]  Internal Medicine Teaching Residency Program  Inpatient Daily Progress Note  ______________________________________________________________________________    Patient: Rob Claros  YOB: 1945   TZY:[id number]    Acct: [de-identified]     Room: [address] [place] [address]  Admit date: 2/7/2021  Today's date: 02/10/21  Number of days in the hospital: 3    SUBJECTIVE   Admitting Diagnosis: Coffee ground emesis  CC: Nausea , vomiting, abdominal pain  Pt examined at bedside. Chart & results reviewed. -Patient had no acute issues over night   -Vitals are stable  -Waiting to get the midline    ROS:  Constitutional:  negative for chills, fevers, sweats  Respiratory:  negative for cough, dyspnea on exertion, hemoptysis, shortness of breath, wheezing  Cardiovascular:  negative for chest pain, chest pressure/discomfort, lower extremity edema, palpitations  Gastrointestinal:  negative for abdominal pain, constipation, diarrhea, nausea, vomiting  Neurological:  negative for dizziness, headache  BRIEF HISTORY   The patient is a pleasant 76 y.o. female the past medical history history of ulcerative esophagitis, gastritis, GERD, large hiatal hernia s/p robotic repair with fundoplication 02/2020, hypoxic respiratory failure  S/p PEG/trach 2020, achalasia, cholecystectomy 5/2020 and ERCP with stent  For CBD obstruction,  depression and anxiety  resistent UTI, anxiety, PEG tube, presents with a chief complaint of nausea, vomiting x 3-4, since yesterday. Associated with coffee-ground emesis. Denies seeing bright red blood in the vomit. Also associated with dull, nonradiating epigastric abdominal pain. Patient presents from [facility]. Patient does report burning micturition since 1 week. Associated with frequency.   No change in bowel habits.  Patient reports she has not used her PEG tube for 1 year or greater and has been taking all food orally.     On my examination, patient's blood pressure is soft. Blood pressure 104/49. Temperature afebrile, pulse in the 80s. Saturating at 95% on room air, respiratory rate 17. Awake, alert, oriented x3. Patient does appear anxious. Has a PEG tube in place, draining biliary fluid. Patient has epigastric abdominal tenderness, no rebound tenderness, no organomegaly. Clear to auscultation bilaterally, S1-S2 heard normal, no murmurs heard. No bilateral pedal edema.     Labs reviewed. Low potassium 3.6, creatinine elevated 1.09 (baseline creatinine 0.5-0.6), lactic acid not elevated 1.7, glucose 108, troponin 49> 45. Alk phos 130, leukocytosis WBC 12.3, hemoglobin 11.5. Urinalysis shows positive nitrate, moderate leukocyte esterase, many bacteria.     Chest x-ray revealed  1. Streaky linear opacities in the left lung base may represent subsegmental   atelectasis versus inflammatory process.       2. No evidence for free air in the upper abdomen.       3.  Nonobstructive bowel gas pattern.  Mild stool burden.            Of note, Patient underwent EGD 11/9/2020 showing severe esophagitis, large hiatal hernia but no Kirill erosions. Patient also was seen by GI 05/2020 for dilated CBD, possible stone on imaging.  Patient underwent ERCP with stent placement.  Patient was scheduled 2/10/2021 with Dr Umberto San for EGD/ERCP with stent removal.  Patient was last admitted to hospital in December 2020 for management of sepsis likely secondary to UTI. Had Klebsiella and ESBL UTI. Was treated with meropenem. Also had expressive aphasia. Neurology was consulted. MRI normal, EEG showing left frontal slowing. Recommended 2 weeks Holter monitoring. And was discharged home.     In the ER patient received two 500 mL boluses of normal saline, fentanyl 50 mg IV once, haldol IM 2 mg once. Zofran 4mg IV once. Started on protonix drip.          OBJECTIVE     Vital Signs:  /67   Pulse 94   Temp 98.8 °F (37.1 °C)   Resp 16   Ht 5' 2\" (1.575 m)   Wt 186 lb 8.2 oz (84.6 kg)   SpO2 98%   BMI 34.11 kg/m²     Temp (24hrs), Av.3 °F (37.4 °C), Min:98.3 °F (36.8 °C), Max:101 °F (38.3 °C)    In: 1144   Out: 700 [Urine:700]    Physical Exam:   Constitutional: This is a well developed, well nourished, 30-34.9 - Obesity Grade I 76y.o. year old female who is alert, oriented, cooperative and in no apparent distress. Head:normocephalic and atraumatic. EENT:  PERRLA. No conjunctival injections. Septum was midline, mucosa was without erythema, exudates or cobblestoning. No thrush was noted. Neck: Supple without thyromegaly. No elevated JVP. Trachea was midline. Respiratory: Chest was symmetrical without dullness to percussion. Breath sounds bilaterally were clear to auscultation. There were no wheezes, rhonchi or rales. There is no intercostal retraction or use of accessory muscles. No egophony noted. Cardiovascular: Regular without murmur, clicks, gallops or rubs. Abdomen: Epigastric abdominal tenderness +, no rebound tenderness. Peg Tube removed. Dressing in place. Lymphatic: No lymphadenopathy. Musculoskeletal: Normal curvature of the spine. No gross muscle weakness. Extremities:  No lower extremity edema, ulcerations, tenderness, varicosities or erythema. Muscle size, tone and strength are normal.  No involuntary movements are noted. Skin:  Warm and dry. Good color, turgor and pigmentation. No lesions or scars.   No cyanosis or clubbing  Neurological/Psychiatric: The patient's general behavior, level of consciousness, thought content and emotional status is normal.         Medications:  Scheduled Medications:    phenazopyridine  200 mg Oral TID WC    heparin (porcine)  5,000 Units Subcutaneous 3 times per day    pantoprazole  40 mg Oral QAM AC    sodium chloride flush  10 mL Intravenous 2 times per day    meropenem  1,000 mg Intravenous Q8H    amitriptyline  25 mg Oral Nightly    busPIRone  20 mg Oral TID   [facility] QUEtiapine  25 mg Oral BID    traZODone  100 mg Oral Nightly    clonazePAM  0.25 mg Oral BID     Continuous Infusions:    sodium chloride 50 mL/hr at 02/09/21 0826     PRN Medications    HYDROcodone 5 mg - acetaminophen, 1 tablet, Q8H PRN      polyvinyl alcohol, 1 drop, Q2H PRN      meperidine, 12.5 mg, Q5 Min PRN      fentanNYL, 25 mcg, Q5 Min PRN      fentanNYL, 50 mcg, Q5 Min PRN      sodium chloride flush, 10 mL, PRN      magnesium sulfate, 1,000 mg, PRN      polyethylene glycol, 17 g, Daily PRN      acetaminophen, 650 mg, Q6H PRN    Or      acetaminophen, 650 mg, Q6H PRN      potassium chloride, 10 mEq, PRN      ondansetron, 4 mg, Q8H PRN    Or      ondansetron, 4 mg, Q6H PRN      potassium chloride, 40 mEq, PRN    Or      potassium alternative oral replacement, 40 mEq, PRN    Or      potassium chloride, 10 mEq, PRN      calcium carbonate, 500 mg, TID PRN        Diagnostic Labs:  CBC:   Recent Labs     02/08/21  0402 02/09/21  0425 02/10/21  0516   WBC 5.6 4.8 4.8   RBC 3.68* 4.05 4.24   HGB 9.8* 10.8* 11.2*   HCT 32.5* 35.0* 36.7   MCV 88.3 86.4 86.6   RDW 15.7* 15.2* 15.4*    196 192     BMP:   Recent Labs     02/08/21  0402 02/09/21  0425 02/10/21  0516    138 132*   K 3.8 4.0 3.7   * 103 98   CO2 23 24 21   BUN 17 10 9   CREATININE 0.96* 0.93* 0.93*     BNP: No results for input(s): BNP in the last 72 hours. PT/INR: No results for input(s): PROTIME, INR in the last 72 hours. APTT: No results for input(s): APTT in the last 72 hours. CARDIAC ENZYMES: No results for input(s): CKMB, CKMBINDEX, TROPONINI in the last 72 hours. Invalid input(s): CKTOTAL;3  FASTING LIPID PANEL:  Lab Results   Component Value Date    CHOL 203 (H) 12/14/2020    HDL 43 12/14/2020    TRIG 268 (H) 12/14/2020     LIVER PROFILE:   No results for input(s): AST, ALT, ALB, BILIDIR, BILITOT, ALKPHOS in the last 72 hours.    MICROBIOLOGY:   Lab Results   Component Value Date/Time    CULTURE (A) 02/07/2021 11:09 PM     NON LACTOSE FERMENTING GRAM NEGATIVE RODS >[id number] CFU/ML       Imaging:    Xr Abdomen (kub) (single Ap View)    Result Date: 2/7/2021  1. Streaky linear opacities in the left lung base may represent subsegmental atelectasis versus inflammatory process. 2. No evidence for free air in the upper abdomen. 3.  Nonobstructive bowel gas pattern. Mild stool burden. Xr Chest Portable    Result Date: 2/7/2021  1. Streaky linear opacities in the left lung base may represent subsegmental atelectasis versus inflammatory process. 2. No evidence for free air in the upper abdomen. 3.  Nonobstructive bowel gas pattern. Mild stool burden. ASSESSMENT & PLAN     ASSESSMENT / PLAN:     Hematemesis likely secondary to history of ulcerative esophagitis versus gastritis  -Stop IV fluids  -s/p  EGD showing reflux esophagitis without any active bleeding, PEG tube removed  -PPI daily life long    -HB has been stable and it is 11.2  -No active melena  -GI signed off    BENEDICT likely pre-renal:  -Due to vomiting  -Improving  -Encourage oral intake of fluids  -Creat today is 0.93 which was 1.09 on arrival  -BMP check inn one week time    Acute cystitis with history of recurrent UTI. -On meropenem due to history of ESBL  -Will need midline for IV meropenem  -Urine culture is showing non lactose fermenting gram negative rods. Waiting on full results and sensivities  -f/u on urine cultures     History of CBD dilation s/p stent placement 05/2020.  -S/p ERCP with stent removal, cholangiogram and balloon extraction of stones and sludge    S/p PEG tube placement  -PEG tube removed on 2/8/2020    Acute on chronic anemia:  -HB has been stable  -OP CBC in one week time    -Iron deficiency corrected in November 2020.     Elevated troponins  -Trended down.  -Likely type II NSTEMI     DVT ppx: Heparin  Gastric ppx: Protonix      PT/OT: on board  Discharge Planning / SW: Ongoing    Kannan Lucio MD  Internal Medicine Resident, PGY-1  [address], [place]  2/10/2021, 12:14 PM    Attending Physician Statement  I have discussed the care of Kelvin Suarez with the resident team. I have examined the patient myself and taken ros and hpi , including pertinent history and exam findings,  with the resident. I have reviewed the key elements of all parts of the encounter with the resident. I agree with the assessment, plan and orders as documented by the resident. Principal Problem:    Coffee ground emesis  Active Problems:    BENEDICT (acute kidney injury) ([facility] [address]    Recurrent UTI    S/P percutaneous endoscopic gastrostomy (PEG) tube placement (HCC)    Acute cystitis without hematuria    Ulcerative esophagitis  Resolved Problems:    * No resolved hospital problems.  *        Electronically signed by Marti Mccrary MD

## 2021-02-10 NOTE — CARE COORDINATION
nHpredict tool uploaded to chart and can be viewed in the media tab, recommending SNF for greatest gains. nH Predict Outcome report for Joel Gomes ,  1945. Predict is recommending SNF with expected length of stay of 15.9 days, and projected CG hours of  4  post stay. It was noted that prior to admission with you guys she was going back to LTC at [facility], where she resides.                Kierra Vazquez, JUAREZ  Telephonic Skilled Inpatient Care Coordinator/Transitional Care Coordinator  M: [phone]

## 2021-02-10 NOTE — PROGRESS NOTES
Occupational Therapy    Occupational Therapy Not Seen Note    DATE: 2/10/2021  Name: Luli Boone  : 1945  MRN: [id number]    Patient not available for Occupational Therapy due to:    Pt. Declined OT services this date d/t c/o fatigue and pain.     Electronically signed by JEWEL Sheth on 2/10/2021 at 11:43 AM

## 2021-02-11 PROCEDURE — 99239 HOSP IP/OBS DSCHRG MGMT >30: CPT | Performed by: INTERNAL MEDICINE

## 2021-02-11 NOTE — PROGRESS NOTES
[facility] arrived to transport patient via stretcher back to  [facility]. All belongings packed and sent with patient. Report called to receiving facility, all questions answered. Paper scripts for Norco and Gabapentin obtained and placed with papers to be transferred, per request of receiving facility.    Electronically signed by Bismark Cody RN on 2/10/2021 at 11:37 PM

## 2021-02-13 NOTE — DISCHARGE SUMMARY
[address]     Department of Internal Medicine - Staff Internal Medicine Teaching Service    INPATIENT DISCHARGE SUMMARY      Patient Identification:  Rob Claros is a 76 y.o. female. :  1945  MRN: [id number]     Acct: [de-identified]   PCP: Cecile Pham MD  Admit Date:  2021  Discharge date and time: 2021 12:00 AM   Attending Provider: No att. providers found                                     [address] Problem Lists:  Principal Problem:    Coffee ground emesis  Active Problems:    BENEDICT (acute kidney injury) ([facility] [address]    Recurrent UTI    S/P percutaneous endoscopic gastrostomy (PEG) tube placement ([facility] [address]    Acute cystitis without hematuria    Ulcerative esophagitis  Resolved Problems:    * No resolved hospital problems. [facility] STAY     Brief Inpatient course:   Rob Claros is a pleasant 75 y. o. female the past medical history history of ulcerative esophagitis, gastritis, GERD, large hiatal hernia s/p robotic repair with fundoplication 2020, hypoxic respiratory failure  S/p PEG/trach , achalasia, cholecystectomy 2020 and ERCP with stent  For CBD obstruction,  depression and anxiety  resistent UTI, anxiety, PEG tube, presents with a chief complaint of nausea, vomiting x 3-4, since yesterday.  Associated with coffee-ground emesis.  Denies seeing bright red blood in the vomit.  Also associated with dull, nonradiating epigastric abdominal pain.  Patient presents from [facility].  Patient does report burning micturition since 1 week.  Associated with frequency.  No change in bowel habits.  Patient reports she has not used her PEG tube for 1 year or greater and has been taking all food orally.     On my examination, patient's blood pressure is soft.  Blood pressure 104/49.  Temperature afebrile, pulse in the 80s.  Saturating at 95% on room air, respiratory rate 17.  Awake, alert, oriented x3.  Patient does appear anxious.  Has a PEG tube in place, draining biliary fluid.  Patient has epigastric abdominal tenderness, no rebound tenderness, no organomegaly.  Clear to auscultation bilaterally, S1-S2 heard normal, no murmurs heard.  No bilateral pedal edema.     Labs reviewed.  Low potassium 3.6, creatinine elevated 1.09 (baseline creatinine 0.5-0.6), lactic acid not elevated 1.7, glucose 108, troponin 49> 45.  Alk phos 130, leukocytosis WBC 12.3, hemoglobin 11.5.  Urinalysis shows positive nitrate, moderate leukocyte esterase, many bacteria.     Chest x-ray revealed  1. Streaky linear opacities in the left lung base may represent subsegmental   atelectasis versus inflammatory process.       2. No evidence for free air in the upper abdomen.       3.  Nonobstructive bowel gas pattern.  Mild stool burden.            Of note, Patient underwent EGD 11/9/2020 showing severe esophagitis, large hiatal hernia but no Avery Brock also was seen by GI 05/2020 for dilated CBD, possible stone on imaging.  Patient underwent ERCP with stent placement.  Patient was scheduled 2/10/2021 with Dr Mady Ag for EGD/ERCP with stent removal.  Patient was last admitted to hospital in December 2020 for management of sepsis likely secondary to UTI.  Had Klebsiella and ESBL UTI.  Was treated with meropenem.  Also had expressive aphasia.  Neurology was consulted.  MRI normal, EEG showing left frontal slowing.  Recommended 2 weeks Holter monitoring.  And was discharged home.     In the ER patient received two 500 mL boluses of normal saline, fentanyl 50 mg IV once, haldol IM 2 mg once. Zofran 4mg IV once. Started on protonix drip.   EGD was done which showed  reflux esophagitis without any active bleeding, PEG tube removed and lifelong PPI daily advised by the GI team. PEG tube removed on 2/8/2020.  Patient discharged on IV meropenem through midline for 4 more days for UTI due history of ESBL  Procedures/ Significant Interventions:      Consults: Consults:     Final Specialist Recommendations/Findings:   IP CONSULT TO INTERNAL MEDICINE  IP CONSULT TO GI  IP CONSULT TO CASE MANAGEMENT  IP CONSULT TO IV TEAM      Any Hospital Acquired Infections: none    Discharge Functional Status:  stable    DISCHARGE PLAN     Disposition: home    Patient Instructions:   Discharge Medication List as of 2/11/2021  1:34 AM      START taking these medications    Details   polyethylene glycol (GLYCOLAX) 17 g packet Take 17 g by mouth daily as needed for Constipation, Disp-527 g, R-1Normal      meropenem (MERREM) 1 g injection Inject 1,000 mg into the muscle every 8 hours for 4 days, Disp-61403 mg, R-0Normal      phenazopyridine (PYRIDIUM) 200 MG tablet Take 1 tablet by mouth 3 times daily (with meals) for 2 days, Disp-4 tablet, R-0Normal      polyvinyl alcohol (LIQUIFILM TEARS) 1.4 % ophthalmic solution Place 1 drop into both eyes every 2 hours as needed for Dry Eyes, Disp-1 Bottle, R-4Normal         CONTINUE these medications which have CHANGED    Details   HYDROcodone-acetaminophen (NORCO) 5-325 MG per tablet Take 1 tablet by mouth every 8 hours as needed for Pain for up to 2 days. , Disp-6 tablet, R-0Print      gabapentin (NEURONTIN) 300 MG capsule Take 1 capsule by mouth 3 times daily for 30 days. , Disp-90 capsule, R-0Print         CONTINUE these medications which have NOT CHANGED    Details   QUEtiapine (SEROQUEL) 25 MG tablet Take 1 tablet by mouth 2 times daily, Disp-60 tablet, R-3Print      ferrous sulfate (FE TABS 325) 325 (65 Fe) MG EC tablet Take 1 tablet by mouth daily (with breakfast), Disp-90 tablet, R-3Print      furosemide (LASIX) 20 MG tablet Take 1 tablet by mouth daily, Disp-60 tablet, R-3Print      clonazePAM (KLONOPIN) 0.5 MG tablet Take 0.5 tablets by mouth 2 times daily for 60 days.  Taking 1/2 tablet BID, Disp-5 tablet,R-0Print      pantoprazole (PROTONIX) 40 MG tablet Take 1 tablet by mouth 2 times dailyDC to SNF      amitriptyline (ELAVIL) 25 MG tablet Take 1 tablet by mouth nightlyDC to SNF      busPIRone (BUSPAR) 10 MG tablet Take 2 tablets by mouth 3 times dailyDC to SNF      metoprolol succinate (TOPROL XL) 25 MG extended release tablet Take 1 tablet by mouth daily, Disp-30 tablet, R-3DC to SNF      docusate (COLACE) 50 MG/5ML liquid 10 mLs by Per G Tube route 2 times daily, Disp-100 mL, R-2Normal      sucralfate (CARAFATE) 1 GM tablet Take 1 tablet by mouth 4 times daily, Disp-120 tablet, R-3Normal      traZODone (DESYREL) 100 MG tablet Take 1 tablet by mouth nightly, Disp-30 tablet, R-0Normal      RA VITAMIN D-3 50 MCG (2000 UT) CAPS take 1 capsule by mouth once daily, DAWHistorical Med      Oyster Shell 500 MG TABS Take 500 mg by mouth 2 times daily Historical Med      calcium carbonate (TUMS) 500 MG chewable tablet Take 1 tablet by mouth 3 times daily as needed for HeartburnHistorical Med      simvastatin (ZOCOR) 40 MG tablet Take 40 mg by mouth nightly. STOP taking these medications       clonazePAM (KLONOPIN) 0.5 MG tablet Comments:   Reason for Stopping:         Acetaminophen 500 MG CAPS Comments:   Reason for Stopping:               Activity: activity as tolerated    Diet: regular diet    Follow-up:    Reyna Chauhan, [address] [facility] [id number] [facility]  [phone]    Schedule an appointment as soon as possible for a visit in 1 week  Hospital follow up    MD Perfecto Greenmerajwinder , [address]  [address] [address]  [phone]    Call in 3 weeks  Follow up for choledocholithiasis      Patient Instructions: 1. Please continue IV meropenem 1 gm three times daily for 4 more days. 2.Follow up with GI in 3 weeks time. 3.Please take Pantoprazole 40 mg daily for life long  4.follow up  With the PCP  5. Follow up on urine cultures  Follow up labs: Follow up imaging:       Quynh Felder MD, MD  Internal Medicine Resident, PGY-1  [facility];  [place], [place]  2/13/2021, 10:23 AM    Attending Physician Statement  I have discussed the care of Dayle Bamberger and I have examined the patient myselft and taken ros and hpi , including pertinent history and exam findings,  with the resident. I have reviewed the key elements of all parts of the encounter with the resident. I agree with the assessment, plan and orders as documented by the resident. I spent approx 35 mins in direct patient care as above and discussing discharge with patient, reviewing medications and counseling for discharge .     Electronically signed by Yasmeen Marroquin MD

## 2021-03-09 ENCOUNTER — TELEPHONE (OUTPATIENT)
Dept: GASTROENTEROLOGY | Age: 76
End: 2021-03-09

## 2021-03-09 NOTE — TELEPHONE ENCOUNTER
Peter Garcia from [facility] called stating that the patient told her that she had cancelled her appointment for 3/10/21. Advised that she decided to keep. She will call transportation and try to get changed back for Wednesday due to cancelling. Writer thanked and call ended.

## 2021-03-10 ENCOUNTER — OFFICE VISIT (OUTPATIENT)
Dept: GASTROENTEROLOGY | Age: 76
End: 2021-03-10
Payer: MEDICARE

## 2021-03-10 VITALS
RESPIRATION RATE: 16 BRPM | TEMPERATURE: 97.4 F | DIASTOLIC BLOOD PRESSURE: 71 MMHG | SYSTOLIC BLOOD PRESSURE: 103 MMHG | HEART RATE: 99 BPM

## 2021-03-10 DIAGNOSIS — K21.01 GASTROESOPHAGEAL REFLUX DISEASE WITH ESOPHAGITIS AND HEMORRHAGE: ICD-10-CM

## 2021-03-10 DIAGNOSIS — Z98.890 S/P NISSEN FUNDOPLICATION (WITHOUT GASTROSTOMY TUBE) PROCEDURE: ICD-10-CM

## 2021-03-10 DIAGNOSIS — K92.2 ACUTE UPPER GI BLEED: ICD-10-CM

## 2021-03-10 DIAGNOSIS — K31.1 GASTRIC OUTLET OBSTRUCTION: ICD-10-CM

## 2021-03-10 DIAGNOSIS — K22.89 ESOPHAGEAL DILATATION: Primary | ICD-10-CM

## 2021-03-10 DIAGNOSIS — R74.8 ELEVATED LIVER ENZYMES: ICD-10-CM

## 2021-03-10 PROCEDURE — 4040F PNEUMOC VAC/ADMIN/RCVD: CPT | Performed by: INTERNAL MEDICINE

## 2021-03-10 PROCEDURE — 1036F TOBACCO NON-USER: CPT | Performed by: INTERNAL MEDICINE

## 2021-03-10 PROCEDURE — 99214 OFFICE O/P EST MOD 30 MIN: CPT | Performed by: INTERNAL MEDICINE

## 2021-03-10 PROCEDURE — 1123F ACP DISCUSS/DSCN MKR DOCD: CPT | Performed by: INTERNAL MEDICINE

## 2021-03-10 PROCEDURE — G8417 CALC BMI ABV UP PARAM F/U: HCPCS | Performed by: INTERNAL MEDICINE

## 2021-03-10 PROCEDURE — 1090F PRES/ABSN URINE INCON ASSESS: CPT | Performed by: INTERNAL MEDICINE

## 2021-03-10 PROCEDURE — 1111F DSCHRG MED/CURRENT MED MERGE: CPT | Performed by: INTERNAL MEDICINE

## 2021-03-10 PROCEDURE — G8427 DOCREV CUR MEDS BY ELIG CLIN: HCPCS | Performed by: INTERNAL MEDICINE

## 2021-03-10 PROCEDURE — 3017F COLORECTAL CA SCREEN DOC REV: CPT | Performed by: INTERNAL MEDICINE

## 2021-03-10 PROCEDURE — G8484 FLU IMMUNIZE NO ADMIN: HCPCS | Performed by: INTERNAL MEDICINE

## 2021-03-10 PROCEDURE — G8400 PT W/DXA NO RESULTS DOC: HCPCS | Performed by: INTERNAL MEDICINE

## 2021-03-10 ASSESSMENT — ENCOUNTER SYMPTOMS
ABDOMINAL DISTENTION: 0
TROUBLE SWALLOWING: 0
RECTAL PAIN: 0
CHOKING: 0
BLOOD IN STOOL: 0
CONSTIPATION: 0
VOMITING: 0
WHEEZING: 0
DIARRHEA: 0
COUGH: 0
NAUSEA: 1
ABDOMINAL PAIN: 1
ANAL BLEEDING: 0

## 2021-03-10 NOTE — PROGRESS NOTES
GI CLINIC FOLLOW UP    INTERVAL HISTORY:   No referring provider defined for this encounter. Chief Complaint   Patient presents with    Nausea     Patient is f/u on hospital visit. She states she has nausea with vomiting. HISTORY OF PRESENT ILLNESS: Gabo Miles is a 76 y.o. female , referred for evaluation of* elevated liver enzymes, large hiatal hernia status post robotic repair with fundoplication, PEG and trach. When she was in the hospital  she is also s/p debbie, she also has  esophagitis , dysphagia  Last visit I scheduled her for ERCP/EGD , but got admitted at [facility] in the interim for hematemesis,  and had egd/ercp : peg removed , esophagitis, biliary stent removed, balloon extraction of CBD sludge and stones     She is a resident in a nursing home at this time she said she is doing very well she has no abdominal pain no no bleeding no fever no chills she still have some nausea here and there she takes nausea medication  Did not have any labs since she left the hospital in February  But she notes no symptoms at this time  We talked about screening she keeps saying that she did have a  colonoscopy per Dr Aracelis Vaughan : no polyps and has not been for 5 years according to her and her family. We do not have record of that          Work-up for the elevated liver enzymes showed that the patient had cholelithiasis without cholecystitis, there was dilatation of the CBD, other liver disease markers were negative, EGD was done showed severe esophagitis, severe gastritis, pyloric stenosis which was dilated, MRI was done showed CBD obstruction for which an ERCP was done, was difficult to read please refer to the ERCP result. Past Medical,Family, and Social History reviewed and does contribute to the patient presentingcondition. Patient's PMH/PSH,SH,PSYCH Hx, MEDs, ALLERGIES, and ROS were all reviewed and updated in the appropriate sections.     PAST MEDICAL HISTORY:  Past Medical History: Diagnosis Date    Anxiety     Arthritis     Back pain     Bronchitis     Caffeine use     8 coffee / day    Carpal tunnel syndrome     Cataracts, bilateral     Constipation     Depression     Diarrhea     GERD (gastroesophageal reflux disease)     H/O gastric ulcer     Hyperlipidemia     Hypertension     Mumps     MVP (mitral valve prolapse)     Dr. Hills Poag in May 2019    Recurrent UTI     Dr. Veloz Rm    Sinusitis     Tracheostomy in place [facility])     Ulcerative esophagitis     Wellness examination     Dr. Shelley Davison seen in Jan 2020       Past Surgical History:   Procedure Laterality Date    APPENDECTOMY      CARPAL TUNNEL RELEASE      CHOLECYSTECTOMY, LAPAROSCOPIC N/A 05/22/2020    XI LAPAROSCOPIC ROBOTIC CHOLECYSTECTOMY, PYLOROPLASTY performed by Jacinto Cardona MD at [facility] ERCP  05/21/2020    with stent insertion, balloon dilation, sphinctereotomy    ERCP  05/21/2020    ** CASE IN OR WITY GI STAFF** ERCP STENT INSERTION performed by Prasad Samuels MD at [facility]    ERCP  05/21/2020    ** CASE IN OR WITH GI STAFF**ERCP SPHINCTER/PAPILLOTOMY performed by Prasad Samuels MD at [facility]    ERCP  05/21/2020    ** CASE IN OR WITH GI STAFF**ERCP DILATION BALLOON performed by [facility]ad [facility]els MD at [facility]    ERCP N/A 2/8/2021    ERCP STENT REMOVAL performed by Kiana Liu MD at [facility]    ERCP  2/8/2021    ERCP STONE REMOVAL performed by Kiana Liu MD at [address] [facility]    GASTRIC FUNDOPLICATION N/A 21/15/0618    XI LAPAROSCOPIC ROBOTIC HIATAL HERNIA REPAIR, CHERYL FUNDOPLICATION performed by Jacinto Cardona MD at [address] [facility] N/A 03/27/2020    EGD PEG TUBE PLACEMENT performed by Jacinto Cardona MD at [address] [facility] N/A 2/8/2021    **CASE IN O.R. W/ GI STAFF - EGD WITH REMOVAL PEG TUBE performed by Kiana Liu MD at [facility]    HAND SURGERY      HC CATH POWER PICC TRIPLE  03/04/2020         HYSTERECTOMY      Abdominal    JOINT REPLACEMENT Right     right hip    OVARY REMOVAL      RHINOPLASTY      TONSILLECTOMY      TOTAL HIP ARTHROPLASTY      TOTAL NEPHRECTOMY      atrophic from infections, age 14    TRACHEOSTOMY N/A 03/27/2020    **ADD ON **WANTS 10:00AM **TRACHEOTOMY performed by Genia Harada, MD at [address] 04/02/2020    TRACHEOTOMY EXCHANGE performed by Genia Harada, MD at [place] [address] N/A 03/17/2020    BEDSIDE EGD ESOPHAGOGASTRODUODENOSCOPY (ICU) performed by Genia Harada, MD at [address] [place] [address] N/A 05/18/2020    EGD BIOPSY performed by Rebekah Elliott MD at [address] [place] [address]  05/18/2020    EGD DILATION BALLOON performed by Rebekah Elliott MD at [address] [place] [address] N/A 07/06/2020    ** CASE IN O.R. WITH GI STAFF** EGD ESOPHAGOGASTRODUODENOSCOPY performed by Ilia Chirinos MD at [address] [place] [address] 11/09/2020    EGD DIAGNOSTIC ONLY performed by Meenu Khan MD at [address] [place] [address]  02/08/2021    - EGD WITH REMOVAL PEG TUBE,ERCP STENT REMOVAL,ERCP STONE REMOVAL       CURRENT MEDICATIONS:    Current Outpatient Medications:     polyethylene glycol (GLYCOLAX) 17 g packet, Take 17 g by mouth daily as needed for Constipation, Disp: 527 g, Rfl: 1    polyvinyl alcohol (LIQUIFILM TEARS) 1.4 % ophthalmic solution, Place 1 drop into both eyes every 2 hours as needed for Dry Eyes, Disp: 1 Bottle, Rfl: 4    gabapentin (NEURONTIN) 300 MG capsule, Take 1 capsule by mouth 3 times daily for 30 days. , Disp: 90 capsule, Rfl: 0    QUEtiapine (SEROQUEL) 25 MG tablet, Take 1 tablet by mouth 2 times daily, Disp: 60 tablet, Rfl: 3    ferrous sulfate (FE TABS 325) 325 (65 Fe) MG EC tablet, Take 1 tablet by mouth daily (with breakfast), Disp: 90 tablet, Rfl: 3    furosemide (LASIX) 20 MG tablet, Take 1 tablet by mouth daily, Disp: 60 tablet, Rfl: 3    pantoprazole (PROTONIX) 40 MG tablet, Take 1 tablet by mouth 2 times daily, Disp: , Rfl:     amitriptyline (ELAVIL) 25 MG tablet, Take 1 tablet by mouth nightly, Disp: , Rfl:     busPIRone (BUSPAR) 10 MG tablet, Take 2 tablets by mouth 3 times daily, Disp: , Rfl:     metoprolol succinate (TOPROL XL) 25 MG extended release tablet, Take 1 tablet by mouth daily, Disp: 30 tablet, Rfl: 3    docusate (COLACE) 50 MG/5ML liquid, 10 mLs by Per G Tube route 2 times daily, Disp: 100 mL, Rfl: 2    sucralfate (CARAFATE) 1 GM tablet, Take 1 tablet by mouth 4 times daily, Disp: 120 tablet, Rfl: 3    traZODone (DESYREL) 100 MG tablet, Take 1 tablet by mouth nightly, Disp: 30 tablet, Rfl: 0    RA VITAMIN D-3 50 MCG (2000 UT) CAPS, take 1 capsule by mouth once daily, Disp: , Rfl:     Oyster Shell 500 MG TABS, Take 500 mg by mouth 2 times daily , Disp: , Rfl:     calcium carbonate (TUMS) 500 MG chewable tablet, Take 1 tablet by mouth 3 times daily as needed for Heartburn, Disp: , Rfl:     simvastatin (ZOCOR) 40 MG tablet, Take 40 mg by mouth nightly., Disp: , Rfl:     gabapentin (NEURONTIN) 300 MG capsule, Take 1 capsule by mouth 3 times daily for 30 days. , Disp: 90 capsule, Rfl: 0    clonazePAM (KLONOPIN) 0.5 MG tablet, Take 0.5 tablets by mouth 2 times daily for 60 days.  Taking 1/2 tablet BID, Disp: 5 tablet, Rfl: 0    ALLERGIES:   Allergies   Allergen Reactions    Prednisone Anxiety    Compazine [Prochlorperazine Maleate]     Penicillin V Potassium     Penicillins Other (See Comments)     Shock- received meropenem and ceftriaxone wo issues 2020       FAMILY HISTORY:       Problem Relation Age of Onset    Cancer Mother         uterine    Heart Attack Mother     Heart Attack Father     Other Maternal Grandfather         foot gangrene    Other Paternal Grandmother         bleeding ulcers    Other Paternal Grandfather         lung cancer         SOCIAL HISTORY:   Social History     Socioeconomic History    Marital status:      Spouse name: Not on file    Number of children: 2    Years of education: Not on file    Highest education level: Not on file   Occupational History    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   Social Needs    Financial resource strain: Not on file    Food insecurity     Worry: Not on file     Inability: Not on file   [employer] needs     Medical: Not on file     Non-medical: Not on file   Tobacco Use    Smoking status: Former Smoker     Packs/day: 1.00     Years: 50.00     Pack years: 50.00     Types: Cigarettes    Smokeless tobacco: Never Used    Tobacco comment: quit 1 year ago    Substance and Sexual Activity    Alcohol use: No    Drug use: No    Sexual activity: Never   Lifestyle    Physical activity     Days per week: Not on file     Minutes per session: Not on file    Stress: Not on file   Relationships    Social connections     Talks on phone: Not on file     Gets together: Not on file     Attends Caodaism service: Not on file     Active member of club or organization: Not on file     Attends meetings of clubs or organizations: Not on file     Relationship status: Not on file    Intimate partner violence     Fear of current or ex partner: Not on file     Emotionally abused: Not on file     Physically abused: Not on file     Forced sexual activity: Not on file   Other Topics Concern    Not on file   Social History Narrative    Not on file       REVIEW OF SYSTEMS: A 12-point review of systemswas obtained and pertinent positives and negatives were enumerated above in the history of present illness. All other reviewed systems / symptoms were negative. Review of Systems   Constitutional: Negative for appetite change, fatigue and unexpected weight change. HENT: Negative for trouble swallowing.     Respiratory: Negative for cough, choking and wheezing. Cardiovascular: Negative for chest pain, palpitations and leg swelling. Gastrointestinal: Positive for abdominal pain (indigestion) and nausea. Negative for abdominal distention, anal bleeding, blood in stool, constipation, diarrhea, rectal pain and vomiting. Genitourinary: Negative for difficulty urinating. Allergic/Immunologic: Negative for environmental allergies and food allergies. Neurological: Positive for dizziness. Negative for weakness, light-headedness, numbness and headaches. Hematological: Bruises/bleeds easily. Psychiatric/Behavioral: Negative for sleep disturbance. The patient is not nervous/anxious. LABORATORY DATA: Reviewed  Lab Results   Component Value Date    WBC 4.8 02/10/2021    HGB 11.2 (L) 02/10/2021    HCT 36.7 02/10/2021    MCV 86.6 02/10/2021     02/10/2021     (L) 02/10/2021    K 3.7 02/10/2021    CL 98 02/10/2021    CO2 21 02/10/2021    BUN 9 02/10/2021    CREATININE 0.93 (H) 02/10/2021    LABALBU 3.5 02/07/2021    BILITOT 0.24 (L) 02/07/2021    ALKPHOS 130 (H) 02/07/2021    AST 10 02/07/2021    ALT 10 02/07/2021    INR 1.0 12/14/2020         Lab Results   Component Value Date    RBC 4.24 02/10/2021    HGB 11.2 (L) 02/10/2021    MCV 86.6 02/10/2021    MCH 26.4 02/10/2021    MCHC 30.5 02/10/2021    RDW 15.4 (H) 02/10/2021    MPV 11.0 02/10/2021    BASOPCT 1 02/10/2021    LYMPHSABS 0.48 (L) 02/10/2021    MONOSABS 0.29 02/10/2021    NEUTROABS 3.74 02/10/2021    EOSABS 0.05 02/10/2021    BASOSABS 0.05 02/10/2021         DIAGNOSTIC TESTING:     Fluoro For Surgical Procedures    Result Date: 2/8/2021  Radiology exam is complete. No Radiologist dictation. Please follow up with ordering provider. PHYSICAL EXAMINATION: Vital signs reviewed per the nursing documentation. /71   Pulse 99   Temp 97.4 °F (36.3 °C)   Resp 16   There is no height or weight on file to calculate BMI.    Physical Exam  Vitals signs and nursing note reviewed. Constitutional:       General: She is not in acute distress. Appearance: She is well-developed. She is not diaphoretic. HENT:      Head: Normocephalic. Mouth/Throat:      Pharynx: No oropharyngeal exudate. Eyes:      General: No scleral icterus. Pupils: Pupils are equal, round, and reactive to light. Neck:      Musculoskeletal: Normal range of motion and neck supple. Thyroid: No thyromegaly. Vascular: No JVD. Trachea: No tracheal deviation. Cardiovascular:      Rate and Rhythm: Normal rate and regular rhythm. Heart sounds: Normal heart sounds. No murmur. Pulmonary:      Effort: Pulmonary effort is normal. No respiratory distress. Breath sounds: Normal breath sounds. No wheezing. Abdominal:      General: Bowel sounds are normal. There is no distension. Palpations: Abdomen is soft. Tenderness: There is no abdominal tenderness. There is no guarding or rebound. Comments: No ascites   Musculoskeletal: Normal range of motion. Skin:     General: Skin is warm. Coloration: Skin is not pale. Findings: No erythema or rash. Comments: She is not diaphoretic   Neurological:      Mental Status: She is alert and oriented to person, place, and time. Deep Tendon Reflexes: Reflexes are normal and symmetric. Psychiatric:         Behavior: Behavior normal.         Thought Content: Thought content normal.         Judgment: Judgment normal.           IMPRESSION: Ms. Donya Duncan is a 76 y.o. female with      Diagnosis Orders   1. Esophageal dilatation     2. Acute upper GI bleed  CBC Auto Differential   3. Gastric outlet obstruction     4. S/P Nissen fundoplication (without gastrostomy tube) procedure     5. Elevated liver enzymes  Hepatic Function Panel   6.  Gastroesophageal reflux disease with esophagitis and hemorrhage       Will proceed with the above make sure her liver enzymes normalized completely  We will look at her hemoglobin also  She is asymptomatic right now except for chronic nausea which she has which is very mild  Treated symptomatically and with PPI  As stated above she said she had a colonoscopy in the last for 5 years which was negative at the [facility] clinic she would get a copy of it sent to us    Diet/life style/natural hx /complication of the dx were all explained in details   Past medical, past surgical, social history, psychiatric history, medications or allergies, all reviewed and  updated    Thank you for allowing me to participate in the care of Ms. Laura Christy. For any further questions please do not hesitate to contact me. I have reviewed and agree with the ROS entered by the MA/RN. Note is dictated utilizing voice recognition software. Unfortunately this leads to occasional typographical errors. Please contact our office if you have any questions.       Bogdan Gr MD  [facility]  O: #[phone]

## 2021-03-10 NOTE — LETTER
Charla [address]ullough [address]  [address] [address]  Phone: [phone]  Fax: [phone]    Tiffany Forde MD        March 10, 2021    [address] [facility] [address]      Dear [facility]:    Ana Reis was not seen removing her mask in the office today. If you have any questions or concerns, please don't hesitate to call.     Sincerely,        Tiffany Forde MD

## 2021-03-12 ENCOUNTER — TELEPHONE (OUTPATIENT)
Dept: GASTROENTEROLOGY | Age: 76
End: 2021-03-12

## 2021-03-12 NOTE — TELEPHONE ENCOUNTER
Benny Ibrahim from [facility] calling as Dr Karmen Hoskins asked for the patient to get the colonoscopy [facility]note 2[facility] and that there is Info in [facility] they should get. She states she is unable to get into Epic and they dont get the colon results.  Please fax then the [facility] and the Colon screening to them as well [phone] Please abstract the following data from this visit with this patient into the appropriate field in Epic:  HPV done on 01/23/15 by Karri RABAGO, result was negative.  Report under Media

## 2021-03-30 ENCOUNTER — TELEPHONE (OUTPATIENT)
Dept: GASTROENTEROLOGY | Age: 76
End: 2021-03-30

## 2021-03-30 RX ORDER — SODIUM PHOSPHATE, DIBASIC AND SODIUM PHOSPHATE, MONOBASIC 7; 19 G/133ML; G/133ML
1 ENEMA RECTAL
COMMUNITY
End: 2022-09-13

## 2021-03-30 RX ORDER — BUDESONIDE AND FORMOTEROL FUMARATE DIHYDRATE 160; 4.5 UG/1; UG/1
2 AEROSOL RESPIRATORY (INHALATION) 2 TIMES DAILY
COMMUNITY

## 2021-03-30 RX ORDER — POLYETHYLENE GLYCOL 3350 17 G/17G
17 POWDER, FOR SOLUTION ORAL EVERY OTHER DAY
Status: ON HOLD | COMMUNITY
End: 2021-12-19 | Stop reason: SDUPTHER

## 2021-03-30 RX ORDER — BISACODYL 10 MG
10 SUPPOSITORY, RECTAL RECTAL DAILY PRN
COMMUNITY

## 2021-03-30 RX ORDER — METOCLOPRAMIDE 5 MG/1
5 TABLET ORAL
Status: ON HOLD | COMMUNITY
End: 2021-12-19 | Stop reason: HOSPADM

## 2021-03-30 RX ORDER — HYDROCODONE BITARTRATE AND ACETAMINOPHEN 5; 325 MG/1; MG/1
1 TABLET ORAL EVERY 6 HOURS PRN
Status: ON HOLD | COMMUNITY
End: 2021-09-15 | Stop reason: SDUPTHER

## 2021-03-30 RX ORDER — CLOTRIMAZOLE AND BETAMETHASONE DIPROPIONATE 10; .64 MG/G; MG/G
CREAM TOPICAL 2 TIMES DAILY
Status: ON HOLD | COMMUNITY
End: 2022-05-07

## 2021-04-08 NOTE — PLAN OF CARE
Problem: Pain:  Goal: Pain level will decrease  Description: Pain level will decrease  3/9/2020 0824 by Zully Jackson RN  Outcome: Ongoing  3/9/2020 0032 by Dyllan Tijerina RN  Outcome: Ongoing  Goal: Control of acute pain  Description: Control of acute pain  3/9/2020 0824 by Zully Jackson RN  Outcome: Ongoing  3/9/2020 0032 by Dyllan Tijerina RN  Outcome: Ongoing  Goal: Control of chronic pain  Description: Control of chronic pain  3/9/2020 0824 by Zully Jackson RN  Outcome: Ongoing  3/9/2020 0032 by Dyllan Tijerina RN  Outcome: Ongoing     Problem: Discharge Planning:  Goal: Participates in care planning  Description: Participates in care planning  3/9/2020 0824 by Zully Jackson RN  Outcome: Ongoing  3/9/2020 0032 by Dyllan Tijerina RN  Outcome: Ongoing  Goal: Discharged to appropriate level of care  Description: Discharged to appropriate level of care  3/9/2020 0824 by Zully Jackson RN  Outcome: Ongoing  3/9/2020 0032 by Dyllan Tijerina RN  Outcome: Ongoing     Problem: Airway Clearance - Ineffective:  Goal: Ability to maintain a clear airway will improve  Description: Ability to maintain a clear airway will improve  3/9/2020 0824 by Zully Jackson RN  Outcome: Ongoing  3/9/2020 0032 by Dyllan Tijerina RN  Outcome: Ongoing     Problem: Anxiety/Stress:  Goal: Level of anxiety will decrease  Description: Level of anxiety will decrease  3/9/2020 0824 by Zully Jackson RN  Outcome: Ongoing  3/9/2020 0032 by Dyllan Tijerina RN  Outcome: Ongoing     Problem: Aspiration:  Goal: Absence of aspiration  Description: Absence of aspiration  3/9/2020 0824 by Zully Jackson RN  Outcome: Ongoing  3/9/2020 0032 by Dyllan Tijerina RN  Outcome: Ongoing     Problem:  Bowel Function - Altered:  Goal: Bowel elimination is within specified parameters  Description: Bowel elimination is within specified parameters  3/9/2020 0824 by Zully Jackson RN  Outcome: Ongoing  3/9/2020 0032 by Dyllan Tijerina oriented to person, place and time , normal sensation , short and long term memory intact Impaired:  Goal: Will show no infection signs and symptoms  Description: Will show no infection signs and symptoms  3/9/2020 0824 by Bong Costa RN  Outcome: Ongoing  3/9/2020 0032 by Daniel Thayer RN  Outcome: Ongoing  Goal: Absence of new skin breakdown  Description: Absence of new skin breakdown  3/9/2020 0824 by Bong Costa RN  Outcome: Ongoing  3/9/2020 0032 by Daniel Thayer RN  Outcome: Ongoing     Problem: Sleep Pattern Disturbance:  Goal: Appears well-rested  Description: Appears well-rested  3/9/2020 0824 by Bong Costa RN  Outcome: Ongoing  3/9/2020 0032 by Daniel Thayer RN  Outcome: Ongoing     Problem: Risk for Impaired Skin Integrity  Goal: Tissue integrity - skin and mucous membranes  Description: Structural intactness and normal physiological function of skin and  mucous membranes.   3/9/2020 0032 by Daniel Thayer RN  Outcome: Ongoing     Problem: Falls - Risk of:  Goal: Will remain free from falls  Description: Will remain free from falls  3/9/2020 0824 by Bong Costa RN  Outcome: Ongoing  3/9/2020 0032 by Daniel Thayer RN  Outcome: Ongoing  Goal: Absence of physical injury  Description: Absence of physical injury  3/9/2020 0824 by Bong Costa RN  Outcome: Ongoing  3/9/2020 0032 by Daniel Thayer RN  Outcome: Ongoing     Problem: OXYGENATION/RESPIRATORY FUNCTION  Goal: Patient will maintain patent airway  3/9/2020 0824 by Bong Costa RN  Outcome: Ongoing  3/9/2020 0032 by Daniel Thayer RN  Outcome: Ongoing  3/8/2020 2017 by Orlanod Orellana RCP  Outcome: Ongoing  Goal: Patient will achieve/maintain normal respiratory rate/effort  Description: Respiratory rate and effort will be within normal limits for the patient  3/9/2020 0824 by Bong Costa RN  Outcome: Ongoing  3/9/2020 0032 by Daniel Thayer RN  Outcome: Ongoing  3/8/2020 2017 by Orlando Orellana RCP  Outcome: Ongoing     Problem: Restraint Use - Nonviolent/Non-Self-Destructive Behavior:  Goal:

## 2021-09-07 ENCOUNTER — OFFICE VISIT (OUTPATIENT)
Dept: GASTROENTEROLOGY | Age: 76
End: 2021-09-07
Payer: MEDICARE

## 2021-09-07 VITALS
SYSTOLIC BLOOD PRESSURE: 122 MMHG | DIASTOLIC BLOOD PRESSURE: 77 MMHG | TEMPERATURE: 97.4 F | RESPIRATION RATE: 18 BRPM | HEART RATE: 75 BPM

## 2021-09-07 DIAGNOSIS — R74.8 ELEVATED LIVER ENZYMES: ICD-10-CM

## 2021-09-07 DIAGNOSIS — K31.1 GASTRIC OUTLET OBSTRUCTION: ICD-10-CM

## 2021-09-07 DIAGNOSIS — K22.89 ESOPHAGEAL DILATATION: Primary | ICD-10-CM

## 2021-09-07 DIAGNOSIS — Z98.890 S/P NISSEN FUNDOPLICATION (WITHOUT GASTROSTOMY TUBE) PROCEDURE: ICD-10-CM

## 2021-09-07 DIAGNOSIS — K21.01 GASTROESOPHAGEAL REFLUX DISEASE WITH ESOPHAGITIS AND HEMORRHAGE: ICD-10-CM

## 2021-09-07 DIAGNOSIS — T85.590D: ICD-10-CM

## 2021-09-07 PROCEDURE — 4040F PNEUMOC VAC/ADMIN/RCVD: CPT | Performed by: INTERNAL MEDICINE

## 2021-09-07 PROCEDURE — 3017F COLORECTAL CA SCREEN DOC REV: CPT | Performed by: INTERNAL MEDICINE

## 2021-09-07 PROCEDURE — G8417 CALC BMI ABV UP PARAM F/U: HCPCS | Performed by: INTERNAL MEDICINE

## 2021-09-07 PROCEDURE — 1036F TOBACCO NON-USER: CPT | Performed by: INTERNAL MEDICINE

## 2021-09-07 PROCEDURE — 1090F PRES/ABSN URINE INCON ASSESS: CPT | Performed by: INTERNAL MEDICINE

## 2021-09-07 PROCEDURE — G8427 DOCREV CUR MEDS BY ELIG CLIN: HCPCS | Performed by: INTERNAL MEDICINE

## 2021-09-07 PROCEDURE — 99214 OFFICE O/P EST MOD 30 MIN: CPT | Performed by: INTERNAL MEDICINE

## 2021-09-07 PROCEDURE — 1123F ACP DISCUSS/DSCN MKR DOCD: CPT | Performed by: INTERNAL MEDICINE

## 2021-09-07 PROCEDURE — G8400 PT W/DXA NO RESULTS DOC: HCPCS | Performed by: INTERNAL MEDICINE

## 2021-09-07 ASSESSMENT — ENCOUNTER SYMPTOMS
TROUBLE SWALLOWING: 0
ABDOMINAL PAIN: 1
BLOOD IN STOOL: 0
WHEEZING: 0
COUGH: 0
NAUSEA: 1
DIARRHEA: 1
ABDOMINAL DISTENTION: 0
CHOKING: 0
VOMITING: 0
CONSTIPATION: 1
ANAL BLEEDING: 0
RECTAL PAIN: 0

## 2021-09-07 NOTE — PROGRESS NOTES
GI CLINIC FOLLOW UP    INTERVAL HISTORY:   No referring provider defined for this encounter. Chief Complaint   Patient presents with    Follow-up     Patient is here for 6 mo f/u           HISTORY OF PRESENT ILLNESS: Rl Malave is a 76 y.o. female , referred for evaluation of*elevated liver enzymes, large hiatal hernia status post robotic repair with fundoplication, PEG and trach in the past , GERD, gastric outlet obstruction, dysphagia/E dil   Patient had a complicated year last year with multiple issues please refer to the previous note for details  Today she is here for follow-up last visit we diagnosed her with dysphagia status post dilatation and status post GI bleed  Gastric outlet obstruction Nissen fundoplication  She did have elevated liver enzyme last visit  And I ordered CBC and LFTs on her and those were both normal  Today she is doing a lot better she said  She does have alternating constipation diarrhea she tried to manage her symptoms accordingly with over-the-counter  Senna and miralax   Cbc/ LFT  [facility] 8/2021, normal CBC and LFTs        We talked about screening she keeps saying that she did have a  colonoscopy per Dr Ryan Casey : no polyps and has not been for 5 years according to her and her family. We do not have record of that  Past Medical,Family, and Social History reviewed and does contribute to the patient presentingcondition. Patient's PMH/PSH,SH,PSYCH Hx, MEDs, ALLERGIES, and ROS were all reviewed and updated in the appropriate sections.     PAST MEDICAL HISTORY:  Past Medical History:   Diagnosis Date    Anxiety     Arthritis     Back pain     Bronchitis     Caffeine use     8 coffee / day    Carpal tunnel syndrome     Cataracts, bilateral     Constipation     Depression     Diarrhea     GERD (gastroesophageal reflux disease)     H/O gastric ulcer     Hyperlipidemia     Hypertension     Mumps     MVP (mitral valve prolapse)     Dr. Sangita De Jesus in May 2019    Recurrent UTI     Dr. Ritu Sorto    Sinusitis     Trache[facility] in [facility])     Ulcerative esophagitis     Wellness examination     Dr. Rissa Oates seen in Jan 2020       Past Surgical History:   Procedure Laterality Date    APPENDECTOMY      KRISTI Zavaleta 79, LAPAROSCOPIC N/A 05/22/2020    XI LAPAROSCOPIC ROBOTIC CHOLECYSTECTOMY, PYLOROPLASTY performed by Monika Whaley MD at [facility]  05/21/2020    with stent insertion, balloon dilation, sphinctereotomy    ERCP  05/21/2020    ** CASE IN OR WITY GI STAFF** ERCP STENT INSERTION [facility]    ERCP  05/21/2020    ** CASE IN OR WITH GI STAFF**ERCP SPHINCTER/PAPILLOTOMY performed by Annemarie Her MD at [facility]    ERCP  05/21/2020    ** CASE IN OR WITH GI STAFF**ERCP DILATION BALLOON performed by [facility] Her [facility] at [facility]    ERCP N/A 2/8/2021    ERCP STENT REMOVAL performed by Lexi Rice MD at [facility]    ERCP  2/8/2021    ERCP STONE REMOVAL performed by Lexi Rice MD at [address]    GASTRIC FUNDOPLICATION N/A 14/56/4839    XI LAPAROSCOPIC ROBOTIC HIATAL HERNIA REPAIR, CHERYL FUNDOPLICATION performed by Monika Whaley MD at [facility] N/A 03/27/2020    EGD PEG TUBE PLACEMENT performed by Monika Whaley MD at [facility] N/A 2/8/2021    **CASE IN O.R. W/ GI STAFF - EGD WITH REMOVAL PEG TUBE performed by Lexi Rice MD at [place] [id number] [facility] [place] CATH POWER PICC TRIPLE  03/04/2020         HYSTERECTOMY      Abdominal    JOINT REPLACEMENT Right     right hip    OVARY REMOVAL      RHINOPLASTY      TONSILLECTOMY      TOTAL HIP ARTHROPLASTY      TOTAL NEPHRECTOMY      atrophic from infections, age 13   [facility] TRACHEOSTOMY N/A 03/27/2020    **ADD ON **WANTS 10:00AM **TRACHEOTOMY performed by Monika Whaley, MD at [facility] 04/02/2020    TRACHEOTOMY EXCHANGE performed by Ga Dallas MD at [facility] N/A 03/17/2020    BEDSIDE EGD ESOPHAGOGASTRODUODENOSCOPY (ICU) performed by Ga Dallas MD at [address] [place] [address] N/A 05/18/2020    EGD BIOPSY performed by Jess Bosch MD at [address] [place] [address]  05/18/2020    EGD DILATION BALLOON performed by Jess Bosch MD at [address] [place] [address] N/A 07/06/2020    ** CASE IN O.R. WITH GI STAFF** EGD ESOPHAGOGASTRODUODENOSCOPY performed by Jd Lala MD at [address] [place] [address] 11/09/2020    EGD DIAGNOSTIC ONLY performed by Angela Best MD at [address] [place] [address]  02/08/2021    - EGD WITH REMOVAL PEG TUBE,ERCP STENT REMOVAL,ERCP STONE REMOVAL       CURRENT MEDICATIONS:    Current Outpatient Medications:     bisacodyl (DULCOLAX) 10 MG suppository, Place 10 mg rectally daily as needed for Constipation, Disp: , Rfl:     Sodium Phosphates (FLEET) 7-19 GM/118ML, Place 1 enema rectally As needed after no BM for 5 days, Disp: , Rfl:     clotrimazole-betamethasone (LOTRISONE) 1-0.05 % cream, Apply topically 2 times daily Apply topically 2 times daily. , Disp: , Rfl:     magnesium hydroxide (MILK OF MAGNESIA) 400 MG/5ML suspension, Take by mouth daily as needed for Constipation, Disp: , Rfl:     polyethylene glycol (GLYCOLAX) 17 g packet, Take 17 g by mouth every other day, Disp: , Rfl:     HYDROcodone-acetaminophen (NORCO) 5-325 MG per tablet, Take 1 tablet by mouth every 6 hours as needed for Pain., Disp: , Rfl:     metoclopramide (REGLAN) 5 MG tablet, Take 5 mg by mouth 3 times daily (before meals) For nausea, Disp: , Rfl:     budesonide-formoterol (SYMBICORT) 160-4.5 MCG/ACT AERO, Inhale 2 puffs into the lungs 2 times daily, Disp: , Rfl:   QUEtiapine (SEROQUEL) 25 MG tablet, Take 1 tablet by mouth 2 times daily, Disp: 60 tablet, Rfl: 3    ferrous sulfate (FE TABS 325) 325 (65 Fe) MG EC tablet, Take 1 tablet by mouth daily (with breakfast), Disp: 90 tablet, Rfl: 3    furosemide (LASIX) 20 MG tablet, Take 1 tablet by mouth daily, Disp: 60 tablet, Rfl: 3    pantoprazole (PROTONIX) 40 MG tablet, Take 1 tablet by mouth 2 times daily, Disp: , Rfl:     amitriptyline (ELAVIL) 25 MG tablet, Take 1 tablet by mouth nightly, Disp: , Rfl:     busPIRone (BUSPAR) 10 MG tablet, Take 2 tablets by mouth 3 times daily, Disp: , Rfl:     metoprolol succinate (TOPROL XL) 25 MG extended release tablet, Take 1 tablet by mouth daily, Disp: 30 tablet, Rfl: 3    docusate (COLACE) 50 MG/5ML liquid, 10 mLs by Per G Tube route 2 times daily, Disp: 100 mL, Rfl: 2    sucralfate (CARAFATE) 1 GM tablet, Take 1 tablet by mouth 4 times daily, Disp: 120 tablet, Rfl: 3    traZODone (DESYREL) 100 MG tablet, Take 1 tablet by mouth nightly, Disp: 30 tablet, Rfl: 0    RA VITAMIN D-3 50 MCG (2000 UT) CAPS, take 1 capsule by mouth once daily, Disp: , Rfl:     Oyster Shell 500 MG TABS, Take 500 mg by mouth 2 times daily , Disp: , Rfl:     calcium carbonate (TUMS) 500 MG chewable tablet, Take 1 tablet by mouth 3 times daily as needed for Heartburn, Disp: , Rfl:     simvastatin (ZOCOR) 40 MG tablet, Take 40 mg by mouth nightly., Disp: , Rfl:     gabapentin (NEURONTIN) 300 MG capsule, Take 1 capsule by mouth 3 times daily for 30 days. , Disp: 90 capsule, Rfl: 0    gabapentin (NEURONTIN) 300 MG capsule, Take 1 capsule by mouth 3 times daily for 30 days. , Disp: 90 capsule, Rfl: 0    clonazePAM (KLONOPIN) 0.5 MG tablet, Take 0.5 tablets by mouth 2 times daily for 60 days.  Taking 1/2 tablet BID, Disp: 5 tablet, Rfl: 0    ALLERGIES:   Allergies   Allergen Reactions    Prednisone Anxiety    Compazine [Prochlorperazine Maleate]     Penicillin V Potassium     Penicillins Other (See Comments)     Shock- received meropenem and ceftriaxone wo issues 2020       FAMILY HISTORY:       Problem Relation Age of Onset    Cancer Mother         uterine    Heart Attack Mother     Heart Attack Father     Other Maternal Grandfather         foot gangrene    Other Paternal Grandmother         bleeding ulcers    Other Paternal Grandfather         lung cancer         SOCIAL HISTORY:   Social History     Socioeconomic History    Marital status:      Spouse name: Not on file    Number of children: 2    Years of education: Not on file    Highest education level: Not on file   Occupational History    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   Tobacco Use    Smoking status: Former Smoker     Packs/day: 1.00     Years: 50.00     Pack years: 50.00     Types: Cigarettes    Smokeless tobacco: Never Used    Tobacco comment: quit 1 year ago    Vaping Use    Vaping Use: Former    Substances: Always   Substance and Sexual Activity    Alcohol use: No    Drug use: No    Sexual activity: Never   Other Topics Concern    Not on file   Social History Narrative    Not on file     Social Determinants of Health     Financial Resource Strain:     Difficulty of Paying Living Expenses:    Food Insecurity:     Worried About [address] in the Last Year:     [address] [place] in the Last Year:    Transportation Needs:     Lack of Transportation (Medical):      Lack of Transportation (Non-Medical):    Physical Activity:     Days of Exercise per Week:     Minutes of Exercise per Session:    Stress:     Feeling of Stress :    Social Connections:     Frequency of Communication with Friends and Family:     Frequency of Social Gatherings with Friends and Family:     Attends Jewish Services:     Active Member of Clubs or Organizations:     Attends Club or Organization Meetings:     Marital Status:    Intimate Partner Violence:     Fear of Current or Ex-Partner:     Emotionally Abused:     Physically Abused:  Sexually Abused:        REVIEW OF SYSTEMS: A 12-point review of systemswas obtained and pertinent positives and negatives were enumerated above in the history of present illness. All other reviewed systems / symptoms were negative. Review of Systems   Constitutional: Negative for appetite change, fatigue and unexpected weight change. HENT: Negative for trouble swallowing. Respiratory: Negative for cough, choking and wheezing. Cardiovascular: Negative for chest pain, palpitations and leg swelling. Gastrointestinal: Positive for abdominal pain (indigestion), constipation, diarrhea and nausea (better). Negative for abdominal distention, anal bleeding, blood in stool, rectal pain and vomiting. Genitourinary: Negative for difficulty urinating. Allergic/Immunologic: Negative for environmental allergies and food allergies. Neurological: Positive for dizziness. Negative for weakness, light-headedness, numbness and headaches. Hematological: Bruises/bleeds easily. Psychiatric/Behavioral: Negative for sleep disturbance. The patient is not nervous/anxious.             LABORATORY DATA: Reviewed  Lab Results   Component Value Date    WBC 4.8 02/10/2021    HGB 11.2 (L) 02/10/2021    HCT 36.7 02/10/2021    MCV 86.6 02/10/2021     02/10/2021     (L) 02/10/2021    K 3.7 02/10/2021    CL 98 02/10/2021    CO2 21 02/10/2021    BUN 9 02/10/2021    CREATININE 0.93 (H) 02/10/2021    LABALBU 3.5 02/07/2021    BILITOT 0.24 (L) 02/07/2021    ALKPHOS 130 (H) 02/07/2021    AST 10 02/07/2021    ALT 10 02/07/2021    INR 1.0 12/14/2020         Lab Results   Component Value Date    RBC 4.24 02/10/2021    HGB 11.2 (L) 02/10/2021    MCV 86.6 02/10/2021    MCH 26.4 02/10/2021    MCHC 30.5 02/10/2021    RDW 15.4 (H) 02/10/2021    MPV 11.0 02/10/2021    BASOPCT 1 02/10/2021    LYMPHSABS 0.48 (L) 02/10/2021    MONOSABS 0.29 02/10/2021    NEUTROABS 3.74 02/10/2021    EOSABS 0.05 02/10/2021    BASOSABS 0.05 02/10/2021 DIAGNOSTIC TESTING:     No results found. PHYSICAL EXAMINATION: Vital signs reviewed per the nursing documentation. /77   Pulse 75   Temp 97.4 °F (36.3 °C)   Resp 18   There is no height or weight on file to calculate BMI. Physical Exam  Vitals and nursing note reviewed. Constitutional:       General: She is not in acute distress. Appearance: She is well-developed. She is not diaphoretic. HENT:      Head: Normocephalic. Mouth/Throat:      Pharynx: No oropharyngeal exudate. Eyes:      General: No scleral icterus. Pupils: Pupils are equal, round, and reactive to light. Neck:      Thyroid: No thyromegaly. Vascular: No JVD. Trachea: No tracheal deviation. Cardiovascular:      Rate and Rhythm: Normal rate and regular rhythm. Heart sounds: Normal heart sounds. No murmur heard. Pulmonary:      Effort: Pulmonary effort is normal. No respiratory distress. Breath sounds: Normal breath sounds. No wheezing. Abdominal:      General: Bowel sounds are normal. There is no distension. Palpations: Abdomen is soft. Tenderness: There is no abdominal tenderness. There is no guarding or rebound. Comments: No ascites   Musculoskeletal:         General: Normal range of motion. Cervical back: Normal range of motion and neck supple. Skin:     General: Skin is warm. Coloration: Skin is not pale. Findings: No erythema or rash. Comments: She is not diaphoretic   Neurological:      Mental Status: She is alert and oriented to person, place, and time. Deep Tendon Reflexes: Reflexes are normal and symmetric. Psychiatric:         Behavior: Behavior normal.         Thought Content: Thought content normal.         Judgment: Judgment normal.           IMPRESSION: Ms. Dell Jennings is a 76 y.o. female with    Diagnosis Orders   1. Esophageal dilatation     2. Gastric outlet obstruction     3.  S/P Nissen fundoplication (without gastrostomy tube) procedure     4. Elevated liver enzymes     5. Gastroesophageal reflux disease with esophagitis and hemorrhage     6. Biliary stent obstruction, subsequent encounter       Had colonoscopy in [place] 2 yaers no polyps per her and her son as stated above    For now we will watch and treat symptomatically accordingly and will take it from there      Diet/life style/natural hx /complication of the dx were all explained in details   Past medical, past surgical, social history, psychiatric history, medications or allergies, all reviewed and  updated    Thank you for allowing me to participate in the care of Ms. Ryland Martines. For any further questions please do not hesitate to contact me. I have reviewed and agree with the ROS entered by the MA/RN. Note is dictated utilizing voice recognition software. Unfortunately this leads to occasional typographical errors. Please contact our office if you have any questions.       Carlos Whittington MD  [facility]  O: #[phone]

## 2021-09-09 ENCOUNTER — APPOINTMENT (OUTPATIENT)
Dept: GENERAL RADIOLOGY | Age: 76
DRG: 872 | End: 2021-09-09
Payer: MEDICARE

## 2021-09-09 ENCOUNTER — HOSPITAL ENCOUNTER (INPATIENT)
Age: 76
LOS: 6 days | Discharge: NURSING, REHAB OR LONG-TERM CARE | DRG: 872 | End: 2021-09-15
Attending: EMERGENCY MEDICINE
Payer: MEDICARE

## 2021-09-09 ENCOUNTER — APPOINTMENT (OUTPATIENT)
Dept: CT IMAGING | Age: 76
DRG: 872 | End: 2021-09-09
Payer: MEDICARE

## 2021-09-09 DIAGNOSIS — M54.50 LOW BACK PAIN, UNSPECIFIED BACK PAIN LATERALITY, UNSPECIFIED CHRONICITY, UNSPECIFIED WHETHER SCIATICA PRESENT: ICD-10-CM

## 2021-09-09 DIAGNOSIS — A41.9 SEPTICEMIA (HCC): ICD-10-CM

## 2021-09-09 DIAGNOSIS — N30.00 ACUTE CYSTITIS WITHOUT HEMATURIA: Primary | ICD-10-CM

## 2021-09-09 LAB
-: ABNORMAL
ABSOLUTE EOS #: 0.03 K/UL (ref 0–0.44)
ABSOLUTE IMMATURE GRANULOCYTE: 0.09 K/UL (ref 0–0.3)
ABSOLUTE LYMPH #: 0.75 K/UL (ref 1.1–3.7)
ABSOLUTE MONO #: 0.92 K/UL (ref 0.1–1.2)
ALBUMIN SERPL-MCNC: 3.9 G/DL (ref 3.5–5.2)
ALBUMIN/GLOBULIN RATIO: 1.3 (ref 1–2.5)
ALLEN TEST: ABNORMAL
ALP BLD-CCNC: 151 U/L (ref 35–104)
ALT SERPL-CCNC: 25 U/L (ref 5–33)
AMORPHOUS: ABNORMAL
ANION GAP SERPL CALCULATED.3IONS-SCNC: 15 MMOL/L (ref 9–17)
AST SERPL-CCNC: 25 U/L
BACTERIA: ABNORMAL
BASOPHILS # BLD: 0 % (ref 0–2)
BASOPHILS ABSOLUTE: 0.04 K/UL (ref 0–0.2)
BILIRUB SERPL-MCNC: 0.6 MG/DL (ref 0.3–1.2)
BILIRUBIN URINE: NEGATIVE
BUN BLDV-MCNC: 14 MG/DL (ref 8–23)
BUN/CREAT BLD: ABNORMAL (ref 9–20)
CALCIUM SERPL-MCNC: 9.4 MG/DL (ref 8.6–10.4)
CARBOXYHEMOGLOBIN: 1.9 % (ref 0–5)
CASTS UA: ABNORMAL /LPF (ref 0–8)
CHLORIDE BLD-SCNC: 99 MMOL/L (ref 98–107)
CO2: 20 MMOL/L (ref 20–31)
COLOR: YELLOW
CREAT SERPL-MCNC: 0.94 MG/DL (ref 0.5–0.9)
CRYSTALS, UA: ABNORMAL /HPF
DIFFERENTIAL TYPE: ABNORMAL
EOSINOPHILS RELATIVE PERCENT: 0 % (ref 1–4)
EPITHELIAL CELLS UA: ABNORMAL /HPF (ref 0–5)
FIO2: ABNORMAL
GFR AFRICAN AMERICAN: >60 ML/MIN
GFR NON-AFRICAN AMERICAN: 58 ML/MIN
GFR SERPL CREATININE-BSD FRML MDRD: ABNORMAL ML/MIN/{1.73_M2}
GFR SERPL CREATININE-BSD FRML MDRD: ABNORMAL ML/MIN/{1.73_M2}
GLUCOSE BLD-MCNC: 113 MG/DL (ref 70–99)
GLUCOSE URINE: NEGATIVE
HCO3 VENOUS: 21.1 MMOL/L (ref 24–30)
HCT VFR BLD CALC: 41.6 % (ref 36.3–47.1)
HEMOGLOBIN: 13.7 G/DL (ref 11.9–15.1)
IMMATURE GRANULOCYTES: 1 %
KETONES, URINE: NEGATIVE
LACTIC ACID, SEPSIS WHOLE BLOOD: 1.9 MMOL/L (ref 0.5–1.9)
LACTIC ACID, SEPSIS WHOLE BLOOD: 2.2 MMOL/L (ref 0.5–1.9)
LACTIC ACID, SEPSIS: ABNORMAL MMOL/L (ref 0.5–1.9)
LACTIC ACID, SEPSIS: NORMAL MMOL/L (ref 0.5–1.9)
LEUKOCYTE ESTERASE, URINE: ABNORMAL
LYMPHOCYTES # BLD: 6 % (ref 24–43)
MCH RBC QN AUTO: 28 PG (ref 25.2–33.5)
MCHC RBC AUTO-ENTMCNC: 32.9 G/DL (ref 28.4–34.8)
MCV RBC AUTO: 85.1 FL (ref 82.6–102.9)
METHEMOGLOBIN: ABNORMAL % (ref 0–1.5)
MODE: ABNORMAL
MONOCYTES # BLD: 7 % (ref 3–12)
MUCUS: ABNORMAL
NEGATIVE BASE EXCESS, VEN: ABNORMAL MMOL/L (ref 0–2)
NITRITE, URINE: NEGATIVE
NOTIFICATION TIME: ABNORMAL
NOTIFICATION: ABNORMAL
NRBC AUTOMATED: 0 PER 100 WBC
O2 DEVICE/FLOW/%: ABNORMAL
O2 SAT, VEN: 99.5 % (ref 60–85)
OTHER OBSERVATIONS UA: ABNORMAL
OXYHEMOGLOBIN: ABNORMAL % (ref 95–98)
PATIENT TEMP: 37
PCO2, VEN, TEMP ADJ: ABNORMAL MMHG (ref 39–55)
PCO2, VEN: 24.2 (ref 39–55)
PDW BLD-RTO: 15.9 % (ref 11.8–14.4)
PEEP/CPAP: ABNORMAL
PH UA: 7.5 (ref 5–8)
PH VENOUS: 7.55 (ref 7.32–7.42)
PH, VEN, TEMP ADJ: ABNORMAL (ref 7.32–7.42)
PLATELET # BLD: 166 K/UL (ref 138–453)
PLATELET ESTIMATE: ABNORMAL
PMV BLD AUTO: 11 FL (ref 8.1–13.5)
PO2, VEN, TEMP ADJ: ABNORMAL MMHG (ref 30–50)
PO2, VEN: 203 (ref 30–50)
POSITIVE BASE EXCESS, VEN: 0.4 MMOL/L (ref 0–2)
POTASSIUM SERPL-SCNC: 4 MMOL/L (ref 3.7–5.3)
PROCALCITONIN: 0.13 NG/ML
PROTEIN UA: ABNORMAL
PSV: ABNORMAL
PT. POSITION: ABNORMAL
RBC # BLD: 4.89 M/UL (ref 3.95–5.11)
RBC # BLD: ABNORMAL 10*6/UL
RBC UA: ABNORMAL /HPF (ref 0–4)
RENAL EPITHELIAL, UA: ABNORMAL /HPF
RESPIRATORY RATE: ABNORMAL
SAMPLE SITE: ABNORMAL
SARS-COV-2, RAPID: NOT DETECTED
SEG NEUTROPHILS: 86 % (ref 36–65)
SEGMENTED NEUTROPHILS ABSOLUTE COUNT: 10.79 K/UL (ref 1.5–8.1)
SET RATE: ABNORMAL
SODIUM BLD-SCNC: 134 MMOL/L (ref 135–144)
SPECIFIC GRAVITY UA: 1.02 (ref 1–1.03)
SPECIMEN DESCRIPTION: NORMAL
TEXT FOR RESPIRATORY: ABNORMAL
TOTAL HB: ABNORMAL G/DL (ref 12–16)
TOTAL PROTEIN: 7 G/DL (ref 6.4–8.3)
TOTAL RATE: ABNORMAL
TRICHOMONAS: ABNORMAL
TROPONIN INTERP: ABNORMAL
TROPONIN INTERP: ABNORMAL
TROPONIN T: ABNORMAL NG/ML
TROPONIN T: ABNORMAL NG/ML
TROPONIN, HIGH SENSITIVITY: 28 NG/L (ref 0–14)
TROPONIN, HIGH SENSITIVITY: 35 NG/L (ref 0–14)
TURBIDITY: CLEAR
URINE HGB: ABNORMAL
UROBILINOGEN, URINE: NORMAL
VT: ABNORMAL
WBC # BLD: 12.6 K/UL (ref 3.5–11.3)
WBC # BLD: ABNORMAL 10*3/UL
WBC UA: ABNORMAL /HPF (ref 0–5)
YEAST: ABNORMAL

## 2021-09-09 PROCEDURE — 83605 ASSAY OF LACTIC ACID: CPT

## 2021-09-09 PROCEDURE — 6360000004 HC RX CONTRAST MEDICATION: Performed by: STUDENT IN AN ORGANIZED HEALTH CARE EDUCATION/TRAINING PROGRAM

## 2021-09-09 PROCEDURE — 84145 PROCALCITONIN (PCT): CPT

## 2021-09-09 PROCEDURE — 71045 X-RAY EXAM CHEST 1 VIEW: CPT

## 2021-09-09 PROCEDURE — 6360000002 HC RX W HCPCS: Performed by: STUDENT IN AN ORGANIZED HEALTH CARE EDUCATION/TRAINING PROGRAM

## 2021-09-09 PROCEDURE — 96375 TX/PRO/DX INJ NEW DRUG ADDON: CPT

## 2021-09-09 PROCEDURE — 93005 ELECTROCARDIOGRAM TRACING: CPT | Performed by: STUDENT IN AN ORGANIZED HEALTH CARE EDUCATION/TRAINING PROGRAM

## 2021-09-09 PROCEDURE — 87186 SC STD MICRODIL/AGAR DIL: CPT

## 2021-09-09 PROCEDURE — 85025 COMPLETE CBC W/AUTO DIFF WBC: CPT

## 2021-09-09 PROCEDURE — 82805 BLOOD GASES W/O2 SATURATION: CPT

## 2021-09-09 PROCEDURE — 6370000000 HC RX 637 (ALT 250 FOR IP): Performed by: STUDENT IN AN ORGANIZED HEALTH CARE EDUCATION/TRAINING PROGRAM

## 2021-09-09 PROCEDURE — 81001 URINALYSIS AUTO W/SCOPE: CPT

## 2021-09-09 PROCEDURE — 87086 URINE CULTURE/COLONY COUNT: CPT

## 2021-09-09 PROCEDURE — 80053 COMPREHEN METABOLIC PANEL: CPT

## 2021-09-09 PROCEDURE — 87040 BLOOD CULTURE FOR BACTERIA: CPT

## 2021-09-09 PROCEDURE — 74177 CT ABD & PELVIS W/CONTRAST: CPT

## 2021-09-09 PROCEDURE — 2580000003 HC RX 258: Performed by: STUDENT IN AN ORGANIZED HEALTH CARE EDUCATION/TRAINING PROGRAM

## 2021-09-09 PROCEDURE — 87077 CULTURE AEROBIC IDENTIFY: CPT

## 2021-09-09 PROCEDURE — 99284 EMERGENCY DEPT VISIT MOD MDM: CPT

## 2021-09-09 PROCEDURE — 87635 SARS-COV-2 COVID-19 AMP PRB: CPT

## 2021-09-09 PROCEDURE — 84484 ASSAY OF TROPONIN QUANT: CPT

## 2021-09-09 PROCEDURE — 36415 COLL VENOUS BLD VENIPUNCTURE: CPT

## 2021-09-09 PROCEDURE — 96365 THER/PROPH/DIAG IV INF INIT: CPT

## 2021-09-09 PROCEDURE — 1200000000 HC SEMI PRIVATE

## 2021-09-09 RX ORDER — METOPROLOL SUCCINATE 25 MG/1
25 TABLET, EXTENDED RELEASE ORAL DAILY
Status: DISCONTINUED | OUTPATIENT
Start: 2021-09-10 | End: 2021-09-15 | Stop reason: HOSPADM

## 2021-09-09 RX ORDER — ATORVASTATIN CALCIUM 20 MG/1
20 TABLET, FILM COATED ORAL DAILY
Status: DISCONTINUED | OUTPATIENT
Start: 2021-09-10 | End: 2021-09-15 | Stop reason: HOSPADM

## 2021-09-09 RX ORDER — TRAZODONE HYDROCHLORIDE 100 MG/1
100 TABLET ORAL NIGHTLY
Status: DISCONTINUED | OUTPATIENT
Start: 2021-09-09 | End: 2021-09-15 | Stop reason: HOSPADM

## 2021-09-09 RX ORDER — FENTANYL CITRATE 50 UG/ML
50 INJECTION, SOLUTION INTRAMUSCULAR; INTRAVENOUS ONCE
Status: COMPLETED | OUTPATIENT
Start: 2021-09-09 | End: 2021-09-09

## 2021-09-09 RX ORDER — SODIUM CHLORIDE 0.9 % (FLUSH) 0.9 %
5-40 SYRINGE (ML) INJECTION PRN
Status: DISCONTINUED | OUTPATIENT
Start: 2021-09-09 | End: 2021-09-15 | Stop reason: HOSPADM

## 2021-09-09 RX ORDER — ONDANSETRON 2 MG/ML
4 INJECTION INTRAMUSCULAR; INTRAVENOUS ONCE
Status: COMPLETED | OUTPATIENT
Start: 2021-09-09 | End: 2021-09-09

## 2021-09-09 RX ORDER — LANOLIN ALCOHOL/MO/W.PET/CERES
325 CREAM (GRAM) TOPICAL
Status: DISCONTINUED | OUTPATIENT
Start: 2021-09-10 | End: 2021-09-15 | Stop reason: HOSPADM

## 2021-09-09 RX ORDER — AMITRIPTYLINE HYDROCHLORIDE 25 MG/1
25 TABLET, FILM COATED ORAL NIGHTLY
Status: DISCONTINUED | OUTPATIENT
Start: 2021-09-09 | End: 2021-09-15 | Stop reason: HOSPADM

## 2021-09-09 RX ORDER — BUSPIRONE HYDROCHLORIDE 10 MG/1
20 TABLET ORAL 3 TIMES DAILY
Status: DISCONTINUED | OUTPATIENT
Start: 2021-09-09 | End: 2021-09-15 | Stop reason: HOSPADM

## 2021-09-09 RX ORDER — METOCLOPRAMIDE 10 MG/1
5 TABLET ORAL
Status: DISCONTINUED | OUTPATIENT
Start: 2021-09-10 | End: 2021-09-15 | Stop reason: HOSPADM

## 2021-09-09 RX ORDER — SODIUM CHLORIDE 9 MG/ML
INJECTION, SOLUTION INTRAVENOUS CONTINUOUS
Status: DISCONTINUED | OUTPATIENT
Start: 2021-09-09 | End: 2021-09-11

## 2021-09-09 RX ORDER — CLONAZEPAM 0.5 MG/1
0.25 TABLET ORAL 2 TIMES DAILY
Status: DISCONTINUED | OUTPATIENT
Start: 2021-09-09 | End: 2021-09-15 | Stop reason: HOSPADM

## 2021-09-09 RX ORDER — SODIUM CHLORIDE 9 MG/ML
25 INJECTION, SOLUTION INTRAVENOUS PRN
Status: DISCONTINUED | OUTPATIENT
Start: 2021-09-09 | End: 2021-09-15 | Stop reason: HOSPADM

## 2021-09-09 RX ORDER — SUCRALFATE 1 G/1
1 TABLET ORAL 4 TIMES DAILY
Status: DISCONTINUED | OUTPATIENT
Start: 2021-09-09 | End: 2021-09-15 | Stop reason: HOSPADM

## 2021-09-09 RX ORDER — ACETAMINOPHEN 650 MG/1
650 SUPPOSITORY RECTAL EVERY 6 HOURS PRN
Status: DISCONTINUED | OUTPATIENT
Start: 2021-09-09 | End: 2021-09-15 | Stop reason: HOSPADM

## 2021-09-09 RX ORDER — PANTOPRAZOLE SODIUM 40 MG/1
40 TABLET, DELAYED RELEASE ORAL 2 TIMES DAILY
Status: DISCONTINUED | OUTPATIENT
Start: 2021-09-09 | End: 2021-09-15 | Stop reason: HOSPADM

## 2021-09-09 RX ORDER — FENTANYL CITRATE 50 UG/ML
INJECTION, SOLUTION INTRAMUSCULAR; INTRAVENOUS
Status: DISPENSED
Start: 2021-09-09 | End: 2021-09-10

## 2021-09-09 RX ORDER — BISACODYL 10 MG
10 SUPPOSITORY, RECTAL RECTAL DAILY PRN
Status: DISCONTINUED | OUTPATIENT
Start: 2021-09-09 | End: 2021-09-15 | Stop reason: HOSPADM

## 2021-09-09 RX ORDER — ACETAMINOPHEN 325 MG/1
650 TABLET ORAL EVERY 6 HOURS PRN
Status: DISCONTINUED | OUTPATIENT
Start: 2021-09-09 | End: 2021-09-15 | Stop reason: HOSPADM

## 2021-09-09 RX ORDER — QUETIAPINE FUMARATE 25 MG/1
25 TABLET, FILM COATED ORAL 2 TIMES DAILY
Status: DISCONTINUED | OUTPATIENT
Start: 2021-09-09 | End: 2021-09-15 | Stop reason: HOSPADM

## 2021-09-09 RX ORDER — CEFEPIME HYDROCHLORIDE 2 G/1
INJECTION, POWDER, FOR SOLUTION INTRAVENOUS
Status: DISPENSED
Start: 2021-09-09 | End: 2021-09-10

## 2021-09-09 RX ORDER — CALCIUM CARBONATE 200(500)MG
1 TABLET,CHEWABLE ORAL 3 TIMES DAILY PRN
Status: DISCONTINUED | OUTPATIENT
Start: 2021-09-09 | End: 2021-09-15 | Stop reason: HOSPADM

## 2021-09-09 RX ORDER — SODIUM CHLORIDE 0.9 % (FLUSH) 0.9 %
5-40 SYRINGE (ML) INJECTION EVERY 12 HOURS SCHEDULED
Status: DISCONTINUED | OUTPATIENT
Start: 2021-09-09 | End: 2021-09-15 | Stop reason: HOSPADM

## 2021-09-09 RX ORDER — POLYETHYLENE GLYCOL 3350 17 G/17G
17 POWDER, FOR SOLUTION ORAL EVERY OTHER DAY
Status: DISCONTINUED | OUTPATIENT
Start: 2021-09-10 | End: 2021-09-15 | Stop reason: HOSPADM

## 2021-09-09 RX ORDER — FUROSEMIDE 20 MG/1
20 TABLET ORAL DAILY
Status: DISCONTINUED | OUTPATIENT
Start: 2021-09-10 | End: 2021-09-15 | Stop reason: HOSPADM

## 2021-09-09 RX ORDER — GABAPENTIN 300 MG/1
300 CAPSULE ORAL 3 TIMES DAILY
Status: DISCONTINUED | OUTPATIENT
Start: 2021-09-09 | End: 2021-09-15 | Stop reason: HOSPADM

## 2021-09-09 RX ORDER — ONDANSETRON 2 MG/ML
INJECTION INTRAMUSCULAR; INTRAVENOUS
Status: DISPENSED
Start: 2021-09-09 | End: 2021-09-10

## 2021-09-09 RX ORDER — ACETAMINOPHEN 500 MG
1000 TABLET ORAL ONCE
Status: COMPLETED | OUTPATIENT
Start: 2021-09-09 | End: 2021-09-09

## 2021-09-09 RX ORDER — 0.9 % SODIUM CHLORIDE 0.9 %
1000 INTRAVENOUS SOLUTION INTRAVENOUS ONCE
Status: COMPLETED | OUTPATIENT
Start: 2021-09-09 | End: 2021-09-10

## 2021-09-09 RX ORDER — ACETAMINOPHEN 500 MG
TABLET ORAL
Status: DISPENSED
Start: 2021-09-09 | End: 2021-09-10

## 2021-09-09 RX ADMIN — Medication 1250 MG: at 22:21

## 2021-09-09 RX ADMIN — FENTANYL CITRATE 50 MCG: 50 INJECTION, SOLUTION INTRAMUSCULAR; INTRAVENOUS at 21:50

## 2021-09-09 RX ADMIN — IOPAMIDOL 75 ML: 755 INJECTION, SOLUTION INTRAVENOUS at 22:54

## 2021-09-09 RX ADMIN — SODIUM CHLORIDE 1000 ML: 9 INJECTION, SOLUTION INTRAVENOUS at 20:04

## 2021-09-09 RX ADMIN — ACETAMINOPHEN 1000 MG: 500 TABLET ORAL at 20:04

## 2021-09-09 RX ADMIN — CEFEPIME HYDROCHLORIDE 2000 MG: 2 INJECTION, POWDER, FOR SOLUTION INTRAVENOUS at 21:15

## 2021-09-09 RX ADMIN — ONDANSETRON 4 MG: 2 INJECTION INTRAMUSCULAR; INTRAVENOUS at 20:05

## 2021-09-09 ASSESSMENT — PAIN SCALES - GENERAL
PAINLEVEL_OUTOF10: 7

## 2021-09-09 ASSESSMENT — PAIN DESCRIPTION - LOCATION: LOCATION: BACK;ABDOMEN;LEG

## 2021-09-09 NOTE — ED NOTES
Bed: 10  Expected date:   Expected time:   Means of arrival:   Comments:  ANAND Vallejo RN  09/09/21 [id number]

## 2021-09-09 NOTE — ED PROVIDER NOTES
[facility]//MED SURG  Emergency Department Encounter  EmergencyMedicine Resident     Pt Name:Erin Sampson Organ  MRN: [id number]  Jordigfmaria luisa 1945  Date of evaluation: 9/9/21  PCP:  Barbara Mnok MD    This patient was evaluated in the Emergency Department for symptoms described in the history of present illness. The patient was evaluated in the context of the global COVID-19 pandemic, which necessitated consideration that the patient might be at risk for infection with the SARS-CoV-2 virus that causes COVID-19. Institutional protocols and algorithms that pertain to the evaluation of patients at risk for COVID-19 are in a state of rapid change based on information released by regulatory bodies including the CDC and federal and state organizations. These policies and algorithms were followed during the patient's care in the ED. CHIEF COMPLAINT       Chief Complaint   Patient presents with    Fever    Fatigue    Cough       HISTORY OF PRESENT ILLNESS  (Location/Symptom, Timing/Onset, Context/Setting, Quality, Duration, Modifying Factors, Severity.)      Niranjan Malone is a 76 y.o. female who presents with fevers. Patient presents with 1 day of generalized weakness, generalized fatigue, body aches, fevers, chills, diaphoresis, nausea, rhinorrhea. Patient denies headaches, visual changes, lightheadedness, congestion, chest pain, shortness of breath, vomiting, dysuria, diarrhea, constipation, lower extremity swelling or pain. Patient reports chronic abdominal pain that is unchanged, chronic cough that is unchanged. Patient lives at a [facility], found diaphoretic, febrile, transfer to the emergency department for evaluation. Patient is vaccinated against Covid.     PAST MEDICAL / SURGICAL / SOCIAL / FAMILY HISTORY      has a past medical history of Anxiety, Arthritis, Back pain, Bronchitis, Caffeine use, Carpal tunnel syndrome, Cataracts, bilateral, Constipation, Depression, Diarrhea, GERD (gastroesophageal reflux disease), H/O gastric ulcer, Hyperlipidemia, Hypertension, Mumps, MVP (mitral valve prolapse), Recurrent UTI, Sinusitis, Tracheostomy in place [facility]), Ulcerative esophagitis, and Wellness examination. has a past surgical history that includes Hysterectomy; total nephrectomy; Tonsillectomy; Appendectomy; Cystoscopy; Ovary removal; Tubal ligation; rhinoplasty; Carpal tunnel release; Hand surgery; Total hip arthroplasty; eye surgery; Colonoscopy; joint replacement (Right); Gastric fundoplication (N/A, 09/60/5246); hc cath power picc triple (03/04/2020); Upper gastrointestinal endoscopy (N/A, 03/17/2020); tracheostomy (N/A, 03/27/2020); Gastrostomy tube placement (N/A, 03/27/2020); tracheostomy (N/A, 04/02/2020); Upper gastrointestinal endoscopy (N/A, 05/18/2020); Upper gastrointestinal endoscopy (05/18/2020); ERCP (05/21/2020); ERCP (05/21/2020); ERCP (05/21/2020); ERCP (05/21/2020); Cholecystectomy, laparoscopic (N/A, 05/22/2020); Upper gastrointestinal endoscopy (N/A, 07/06/2020); Upper gastrointestinal endoscopy (N/A, 11/09/2020); Upper gastrointestinal endoscopy (02/08/2021); Gastrostomy tube placement (N/A, 2/8/2021); ERCP (N/A, 2/8/2021); and ERCP (2/8/2021). Social History     Socioeconomic History    Marital status:       Spouse name: Not on file    Number of children: 2    Years of education: Not on file    Highest education level: Not on file   Occupational History    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   Tobacco Use    Smoking status: Former Smoker     Packs/day: 1.00     Years: 50.00     Pack years: 50.00     Types: Cigarettes    Smokeless tobacco: Never Used    Tobacco comment: quit 1 year ago    Vaping Use    Vaping Use: Former    Substances: Always   Substance and Sexual Activity    Alcohol use: No    Drug use: No    Sexual activity: Never   Other Topics Concern    Not on file   Social History Narrative    Not on file     Social Determinants of Health     Financial Resource Strain:  Difficulty of Paying Living Expenses:    Food Insecurity:     Worried About Running Out of Food in the Last Year:     Ran Out of Food in the Last Year:    Transportation Needs:     Lack of Transportation (Medical):  Lack of Transportation (Non-Medical):    Physical Activity:     Days of Exercise per Week:     Minutes of Exercise per Session:    Stress:     Feeling of Stress :    Social Connections:     Frequency of Communication with Friends and Family:     Frequency of Social Gatherings with Friends and Family:     Attends Worship Services:     Active Member of Clubs or Organizations:     Attends Club or Organization Meetings:     Marital Status:    Intimate Partner Violence:     Fear of Current or Ex-Partner:     Emotionally Abused:     Physically Abused:     Sexually Abused:        Family History   Problem Relation Age of Onset    Cancer Mother         uterine    Heart Attack Mother     Heart Attack Father     Other Maternal Grandfather         foot gangrene    Other Paternal Grandmother         bleeding ulcers    Other Paternal Grandfather         lung cancer       Allergies:  Prednisone, Compazine [prochlorperazine maleate], Penicillin v potassium, and Penicillins    Home Medications:  Prior to Admission medications    Medication Sig Start Date End Date Taking? Authorizing Provider   HYDROcodone-acetaminophen (NORCO) 5-325 MG per tablet Take 1 tablet by mouth every 6 hours as needed for Pain.    Yes Historical Provider, MD   metoclopramide (REGLAN) 5 MG tablet Take 5 mg by mouth 3 times daily (before meals) For nausea   Yes Historical Provider, MD   budesonide-formoterol (SYMBICORT) 160-4.5 MCG/ACT AERO Inhale 2 puffs into the lungs 2 times daily   Yes Historical Provider, MD   QUEtiapine (SEROQUEL) 25 MG tablet Take 1 tablet by mouth 2 times daily 12/16/20  Yes MARGOTH Beavers NP   furosemide (LASIX) 20 MG tablet Take 1 tablet by mouth daily 12/16/20  Yes MARGOTH Beavers NP gabapentin (NEURONTIN) 300 MG capsule Take 1 capsule by mouth 3 times daily for 30 days. 12/16/20 9/10/21 Yes Daisey Meigs, APRN - NP   clonazePAM (KLONOPIN) 0.5 MG tablet Take 0.5 tablets by mouth 2 times daily for 60 days. Taking 1/2 tablet BID 7/7/20 9/10/21 Yes Wojciech Prakash MD   pantoprazole (PROTONIX) 40 MG tablet Take 1 tablet by mouth 2 times daily 7/7/20  Yes Wojciech Prakash MD   amitriptyline (ELAVIL) 25 MG tablet Take 1 tablet by mouth nightly 7/14/20  Yes Wojciech Prakash MD   busPIRone (BUSPAR) 10 MG tablet Take 2 tablets by mouth 3 times daily 7/14/20  Yes Wojciech Prakash MD   metoprolol succinate (TOPROL XL) 25 MG extended release tablet Take 1 tablet by mouth daily 5/30/20  Yes Chepe Godoy MD   sucralfate (CARAFATE) 1 GM tablet Take 1 tablet by mouth 4 times daily 4/6/20  Yes Ernestine Campos MD   traZODone (DESYREL) 100 MG tablet Take 1 tablet by mouth nightly 4/6/20  Yes Ernestine Campos MD RA VITAMIN D-3 50 MCG (2000 UT) CAPS take 1 capsule by mouth once daily 2/14/20  Yes Historical Provider, MD   Oyster Shell 500 MG TABS Take 500 mg by mouth 2 times daily    Yes Historical Provider, MD   calcium carbonate (TUMS) 500 MG chewable tablet Take 1 tablet by mouth 3 times daily as needed for Heartburn   Yes Historical Provider, MD   simvastatin (ZOCOR) 40 MG tablet Take 40 mg by mouth nightly. Yes Historical Provider, MD   bisacodyl (DULCOLAX) 10 MG suppository Place 10 mg rectally daily as needed for Constipation    Historical Provider, MD   Sodium Phosphates (FLEET) 7-19 GM/118ML Place 1 enema rectally As needed after no BM for 5 days    Historical Provider, MD   clotrimazole-betamethasone (LOTRISONE) 1-0.05 % cream Apply topically 2 times daily Apply topically 2 times daily.     Historical Provider, MD   magnesium hydroxide (MILK OF MAGNESIA) 400 MG/5ML suspension Take by mouth daily as needed for Constipation    Historical Provider, MD   polyethylene glycol (GLYCOLAX) 17 g packet Take 17 g by mouth every other day    Historical Provider, MD   gabapentin (NEURONTIN) 300 MG capsule Take 1 capsule by mouth 3 times daily for 30 days. 2/10/21 3/12/21  Howard Goode MD   ferrous sulfate (FE TABS 325) 325 (65 Fe) MG EC tablet Take 1 tablet by mouth daily (with breakfast) 12/16/20   Shannan Garvin APRN - NP   docusate (COLACE) 50 MG/5ML liquid 10 mLs by Per G Tube route 2 times daily 4/6/20   Rena Madrigal MD       REVIEW OF SYSTEMS    (2-9 systems for level 4, 10 or more for level 5)      Review of Systems   Constitutional: Positive for chills, diaphoresis, fatigue and fever. HENT: Positive for rhinorrhea. Negative for congestion and sore throat. Eyes: Negative for visual disturbance. Respiratory: Negative for cough, chest tightness, shortness of breath and wheezing. Cardiovascular: Negative for chest pain, palpitations and leg swelling. Gastrointestinal: Positive for nausea. Negative for abdominal pain, blood in stool, constipation, diarrhea and vomiting. Genitourinary: Negative for dysuria, frequency, hematuria and urgency. Musculoskeletal: Positive for arthralgias and myalgias. Negative for neck pain and neck stiffness. Skin: Negative for pallor and rash. Neurological: Positive for weakness. Negative for dizziness, syncope, light-headedness and headaches. Psychiatric/Behavioral: Negative for confusion. PHYSICAL EXAM   (up to 7 for level 4, 8 or more for level 5)      INITIAL VITALS:   /60   Pulse 70   Temp 98.2 °F (36.8 °C) (Axillary)   Resp 20   Ht 5' 2\" (1.575 m)   Wt 177 lb (80.3 kg)   SpO2 95%   BMI 32.37 kg/m²     Physical Exam  Constitutional:       General: She is not in acute distress. Appearance: She is well-developed. She is ill-appearing. She is not toxic-appearing or diaphoretic.       Comments: Patient is alert and oriented, openly communicative, does not appear short of breath, but appears diaphoretic, ill, fatigued   HENT:      Head: Normocephalic and atraumatic. Right Ear: External ear normal.      Left Ear: External ear normal.      Nose: Nose normal. No congestion or rhinorrhea. Mouth/Throat:      Mouth: Mucous membranes are moist.      Pharynx: Oropharynx is clear. No oropharyngeal exudate or posterior oropharyngeal erythema. Eyes:      General:         Right eye: No discharge. Left eye: No discharge. Extraocular Movements: Extraocular movements intact. Pupils: Pupils are equal, round, and reactive to light. Neck:      Vascular: No JVD. Trachea: No tracheal deviation. Cardiovascular:      Rate and Rhythm: Normal rate and regular rhythm. Pulses: Normal pulses. Heart sounds: Normal heart sounds. No murmur heard. No friction rub. No gallop. Pulmonary:      Effort: Pulmonary effort is normal. No respiratory distress. Breath sounds: Normal breath sounds. No wheezing or rales. Chest:      Chest wall: No tenderness. Abdominal:      General: There is no distension. Palpations: Abdomen is soft. There is no mass. Tenderness: There is abdominal tenderness. There is no right CVA tenderness, left CVA tenderness or guarding. Comments: Patient has generalized abdominal tenderness, no overlying skin changes, no ecchymosis, no CVA tenderness bilaterally   Musculoskeletal:         General: No tenderness. Normal range of motion. Cervical back: Normal range of motion and neck supple. No rigidity or tenderness. Right lower leg: No edema. Left lower leg: No edema. Skin:     General: Skin is warm. Capillary Refill: Capillary refill takes less than 2 seconds. Findings: No rash. Neurological:      Mental Status: She is alert and oriented to person, place, and time. Cranial Nerves: No cranial nerve deficit. Sensory: No sensory deficit. Motor: No weakness.    Psychiatric:         Behavior: Behavior normal.         DIFFERENTIAL  DIAGNOSIS     PLAN (LABS / Blenda Paula / EKG):  Orders Placed This Encounter   Procedures    Culture, Blood 1    Culture, Blood 1    COVID-19, Rapid    Culture, Urine    Culture, Blood 1    Culture, Blood 2    Sputum gram stain    Respiratory Culture    XR CHEST PORTABLE    CT ABDOMEN PELVIS W IV CONTRAST Additional Contrast? None    CBC Auto Differential    Comprehensive Metabolic Panel    Lactate, Sepsis    Procalcitonin    Troponin    Urinalysis with Microscopic    Troponin    Blood Gas, Venous    CBC    Comprehensive Metabolic Panel w/ Reflex to MG    Ionized Calcium    Magnesium    Phosphorus    Protime-INR    Lactate, Sepsis    ADULT DIET; Regular    Vital signs per unit routine    Notify physician    Up as tolerated    Up with assistance    Daily weights    Intake and output    Place intermittent pneumatic compression device    Telemetry monitoring - continuous duration    Turn or assist with turn approximately every 2 hours if patient is unable to turn self. Remind patient to turn if necessary.     Assess skin per unit guidelines    Pad/offload medical devices    Maintain HOB at the lowest elevation consistent with medical plan of care    Use lift equipment for lifting patient    Maintain heels off of bed at all times    Full Code    Inpatient consult to Internal Medicine   [address] to Dose: Vancomycin    Contact isolation - (e.g., MRSA, VRE, ESBL, multidrug resistant organisms, wounds with major drainage)    OT eval and treat    PT evaluation and treat    Initiate Oxygen Therapy Protocol    PATIENT STATUS (FROM ED OR OR/PROCEDURAL) Inpatient       MEDICATIONS ORDERED:  Orders Placed This Encounter   Medications    vancomycin (VANCOCIN) 1250 mg in dextrose 5 % 250 mL IVPB     Order Specific Question:   Antimicrobial Indications     Answer:   Sepsis of Unknown Etiology    cefepime (MAXIPIME) 2000 mg IVPB minibag     Order Specific Question:   Antimicrobial Indications     Answer: Sepsis of Unknown Etiology    0.9 % sodium chloride bolus    acetaminophen (TYLENOL) tablet 1,000 mg    ondansetron (ZOFRAN) injection 4 mg    fentaNYL (SUBLIMAZE) injection 50 mcg    DISCONTD: vancomycin (VANCOCIN) 1,500 mg in dextrose 5 % 250 mL IVPB     Pharmacy to dose and follow/manage per dr Li Kayla Specific Question:   Antimicrobial Indications     Answer:   Urinary Tract Infection    cefepime (MAXIPIME) 2000 mg IVPB minibag     Order Specific Question:   Antimicrobial Indications     Answer:   Urinary Tract Infection    amitriptyline (ELAVIL) tablet 25 mg    bisacodyl (DULCOLAX) suppository 10 mg    busPIRone (BUSPAR) tablet 20 mg    calcium carbonate (TUMS) chewable tablet 500 mg    clonazePAM (KLONOPIN) tablet 0.25 mg    ferrous sulfate (FE TABS 325) EC tablet 325 mg    furosemide (LASIX) tablet 20 mg    gabapentin (NEURONTIN) capsule 300 mg    metoclopramide (REGLAN) tablet 5 mg    metoprolol succinate (TOPROL XL) extended release tablet 25 mg    pantoprazole (PROTONIX) tablet 40 mg    polyethylene glycol (GLYCOLAX) packet 17 g    QUEtiapine (SEROQUEL) tablet 25 mg    atorvastatin (LIPITOR) tablet 20 mg    sucralfate (CARAFATE) tablet 1 g    traZODone (DESYREL) tablet 100 mg    0.9 % sodium chloride infusion    sodium chloride flush 0.9 % injection 5-40 mL    sodium chloride flush 0.9 % injection 5-40 mL    0.9 % sodium chloride infusion    enoxaparin (LOVENOX) injection 40 mg    OR Linked Order Group     acetaminophen (TYLENOL) tablet 650 mg     acetaminophen (TYLENOL) suppository 650 mg    iopamidol (ISOVUE-370) 76 % injection 75 mL    vancomycin (VANCOCIN) 750 mg in dextrose 5 % 250 mL IVPB     Order Specific Question:   Antimicrobial Indications     Answer:   Urinary Tract Infection    vancomycin (VANCOCIN) intermittent dosing (placeholder)     Order Specific Question:   Antimicrobial Indications     Answer:   Urinary Tract Infection    DISCONTD: 0.9 % sodium chloride bolus    0.9 % sodium chloride bolus       DDX: Sepsis, pneumonia, UTI, Covid    DIAGNOSTIC RESULTS / EMERGENCY DEPARTMENT COURSE / MDM   LAB RESULTS:  Results for orders placed or performed during the hospital encounter of 09/09/21   Culture, Blood 1    Specimen: Blood   Result Value Ref Range    Specimen Description . BLOOD     Special Requests  L AC 3 ML     Culture NO GROWTH 3 HOURS    COVID-19, Rapid    Specimen: Nasopharyngeal Swab   Result Value Ref Range    Specimen Description . NASOPHARYNGEAL SWAB     SARS-CoV-2, Rapid Not Detected Not Detected   CBC Auto Differential   Result Value Ref Range    WBC 12.6 (H) 3.5 - 11.3 k/uL    RBC 4.89 3.95 - 5.11 m/uL    Hemoglobin 13.7 11.9 - 15.1 g/dL    Hematocrit 41.6 36.3 - 47.1 %    MCV 85.1 82.6 - 102.9 fL    MCH 28.0 25.2 - 33.5 pg    MCHC 32.9 28.4 - 34.8 g/dL    RDW 15.9 (H) 11.8 - 14.4 %    Platelets 211 382 - 107 k/uL    MPV 11.0 8.1 - 13.5 fL    NRBC Automated 0.0 0.0 per 100 WBC    Differential Type NOT REPORTED     Seg Neutrophils 86 (H) 36 - 65 %    Lymphocytes 6 (L) 24 - 43 %    Monocytes 7 3 - 12 %    Eosinophils % 0 (L) 1 - 4 %    Basophils 0 0 - 2 %    Immature Granulocytes 1 (H) 0 %    Segs Absolute 10.79 (H) 1.50 - 8.10 k/uL    Absolute Lymph # 0.75 (L) 1.10 - 3.70 k/uL    Absolute Mono # 0.92 0.10 - 1.20 k/uL    Absolute Eos # 0.03 0.00 - 0.44 k/uL    Basophils Absolute 0.04 0.00 - 0.20 k/uL    Absolute Immature Granulocyte 0.09 0.00 - 0.30 k/uL    WBC Morphology NOT REPORTED     RBC Morphology ANISOCYTOSIS PRESENT     Platelet Estimate NOT REPORTED    Comprehensive Metabolic Panel   Result Value Ref Range    Glucose 113 (H) 70 - 99 mg/dL    BUN 14 8 - 23 mg/dL    CREATININE 0.94 (H) 0.50 - 0.90 mg/dL    Bun/Cre Ratio NOT REPORTED 9 - 20    Calcium 9.4 8.6 - 10.4 mg/dL    Sodium 134 (L) 135 - 144 mmol/L    Potassium 4.0 3.7 - 5.3 mmol/L    Chloride 99 98 - 107 mmol/L    CO2 20 20 - 31 mmol/L    Anion Gap 15 9 - 17 mmol/L    Alkaline Sensitivity 28 (H) 0 - 14 ng/L    Troponin T NOT REPORTED <0.03 ng/mL    Troponin Interp NOT REPORTED    Blood Gas, Venous   Result Value Ref Range    pH, Nimesh 7.550 (H) 7.320 - 7.420    pCO2, Nimesh 24.2 (L) 39 - 55    pO2, Nimesh 203.0 (H) 30 - 50    HCO3, Venous 21.1 (L) 24 - 30 mmol/L    Positive Base Excess, Nimesh 0.4 0.0 - 2.0 mmol/L    Negative Base Excess, Nimesh NOT REPORTED 0.0 - 2.0 mmol/L    O2 Sat, Nimesh 99.5 (H) 60.0 - 85.0 %    Total Hb NOT REPORTED 12.0 - 16.0 g/dl    Oxyhemoglobin NOT REPORTED 95.0 - 98.0 %    Carboxyhemoglobin 1.9 0 - 5 %    Methemoglobin NOT REPORTED 0.0 - 1.5 %    Pt Temp 37.0     pH, Nimesh, Temp Adj NOT REPORTED 7.320 - 7.420    pCO2, Nimesh, Temp Adj NOT REPORTED 39 - 55 mmHg    pO2, Nimesh, Temp Adj NOT REPORTED 30 - 50 mmHg    O2 Device/Flow/% NOT REPORTED     Respiratory Rate NOT REPORTED     Jeremi Test NOT REPORTED     Sample Site NOT REPORTED     Pt.  Position NOT REPORTED     Mode NOT REPORTED     Set Rate NOT REPORTED     Total Rate NOT REPORTED     VT NOT REPORTED     FIO2 INFORMATION NOT PROVIDED     Peep/Cpap NOT REPORTED     PSV NOT REPORTED     Text for Respiratory NOT REPORTED     NOTIFICATION NOT REPORTED     NOTIFICATION TIME NOT REPORTED    CBC   Result Value Ref Range    WBC 13.2 (H) 3.5 - 11.3 k/uL    RBC 4.63 3.95 - 5.11 m/uL    Hemoglobin 12.9 11.9 - 15.1 g/dL    Hematocrit 41.5 36.3 - 47.1 %    MCV 89.6 82.6 - 102.9 fL    MCH 27.9 25.2 - 33.5 pg    MCHC 31.1 28.4 - 34.8 g/dL    RDW 16.1 (H) 11.8 - 14.4 %    Platelets 644 974 - 217 k/uL    MPV 10.8 8.1 - 13.5 fL    NRBC Automated 0.0 0.0 per 100 WBC   Comprehensive Metabolic Panel w/ Reflex to MG   Result Value Ref Range    Glucose 126 (H) 70 - 99 mg/dL    BUN 14 8 - 23 mg/dL    CREATININE 1.04 (H) 0.50 - 0.90 mg/dL    Bun/Cre Ratio NOT REPORTED 9 - 20    Calcium 8.7 8.6 - 10.4 mg/dL    Sodium 133 (L) 135 - 144 mmol/L    Potassium 3.7 3.7 - 5.3 mmol/L    Chloride 100 98 - 107 mmol/L    CO2 19 (L) 20 - 31 mmol/L    Anion Gap 14 9 - 17 mmol/L    Alkaline Phosphatase 132 (H) 35 - 104 U/L    ALT 21 5 - 33 U/L    AST 17 <32 U/L    Total Bilirubin 0.52 0.3 - 1.2 mg/dL    Total Protein 6.4 6.4 - 8.3 g/dL    Albumin 3.5 3.5 - 5.2 g/dL    Albumin/Globulin Ratio 1.2 1.0 - 2.5    GFR Non-African American 52 (L) >60 mL/min    GFR African American >60 >60 mL/min    GFR Comment          GFR Staging NOT REPORTED    Ionized Calcium   Result Value Ref Range    Calcium, Ion 0.98 (L) 1.13 - 1.33 mmol/L   Magnesium   Result Value Ref Range    Magnesium 1.9 1.6 - 2.6 mg/dL   Phosphorus   Result Value Ref Range    Phosphorus 3.2 2.6 - 4.5 mg/dL   Protime-INR   Result Value Ref Range    Protime 12.1 9.1 - 12.3 sec    INR 1.1    Lactate, Sepsis   Result Value Ref Range    Lactic Acid, Sepsis NOT REPORTED 0.5 - 1.9 mmol/L    Lactic Acid, Sepsis, Whole Blood 2.7 (H) 0.5 - 1.9 mmol/L       IMPRESSION: 27-year-old female who presents with fatigue, weakness, diaphoresis, febrile, concern for sepsis, will obtain sepsis labs. Will obtain urinalysis, chest x-ray to evaluate for UTI versus pneumonia. Will obtain procalcitonin to evaluate for pneumonia. Will obtain Covid swab, but patient is vaccinated. Will administer IV fluids, cefepime and vancomycin for broad coverage. Will start patient on oxygen as she is satting in the high 80s. Will administer pain medicine, reassess. RADIOLOGY:  CT ABDOMEN PELVIS W IV CONTRAST Additional Contrast? None    Result Date: 9/9/2021  EXAMINATION: CT OF THE ABDOMEN AND PELVIS WITH CONTRAST 9/9/2021 10:49 pm TECHNIQUE: CT of the abdomen and pelvis was performed with the administration of intravenous contrast. Multiplanar reformatted images are provided for review. Dose modulation, iterative reconstruction, and/or weight based adjustment of the mA/kV was utilized to reduce the radiation dose to as low as reasonably achievable. COMPARISON: Abdomen KUB Single AP view February 7, 2021.   CT abdomen and pelvis with contrast November 7, 2020. HISTORY: ORDERING SYSTEM PROVIDED HISTORY: Abdominal pain, sepsis TECHNOLOGIST PROVIDED HISTORY: Abdominal pain, sepsis Decision Support Exception - unselect if not a suspected or confirmed emergency medical condition->Emergency Medical Condition (MA) Reason for Exam: Abdominal pain, sepsis Acuity: Acute Type of Exam: Initial FINDINGS: Abdomen/Pelvis: Lower chest: Bilateral lower lung lobe and left lingular mild bronchiectasis is seen with surrounding multifocal mild scattered consolidation present. The heart is mildly enlarged. .  Pleural surfaces are unremarkable and no evidence of pleural effusion is identified. Organs: Marked diffuse fatty infiltration of the liver is visualized. The liver is enlarged measuring up to 20.1 cm in craniocaudad extent. The right kidney is absent. Left renal small simple cyst is present. The liver, gallbladder, spleen, pancreas, adrenal glands, left kidney, are otherwise unremarkable in appearance. GI/Bowel: Moderate hiatal hernia is present with distal esophageal moderate distension and fluid-filled lumen present. The stomach is otherwise unremarkable without wall thickening or distention. Bowel loops are unremarkable in appearance without evidence of obstruction, distension or mucosal thickening. Pelvis: The urinary bladder is mildly distended and grossly unremarkable in appearance. No evidence of pelvic free fluid is seen. Peritoneum/Retroperitoneum: No evidence of retroperitoneal or intraperitoneal lymphadenopathy is identified. No evidence of intraperitoneal free fluid is seen. Bones/Soft Tissues: Thoracolumbar levoscoliosis is present. Right hip total arthroplasty is noted without evidence of complication seen. The bones, skeletal muscle bundles, fascial planes and subcutaneous soft tissues are otherwise unremarkable in appearance.      1. Persisting moderate hiatal hernia with distal esophageal moderate distension and fluid-filled lumen, possibly associated with gastroesophageal reflux. 2. Marked diffuse hepatic steatosis. 3. Hepatomegaly. 4. Bilateral lower lung lobe and left lingular mild scattered bronchiectasis with surrounding multifocal mild scattered consolidation. Findings suggest presence of pneumonia in setting of changes related to chronic infection. Recommend clinical correlation. XR CHEST PORTABLE    Result Date: 9/9/2021  EXAMINATION: ONE XRAY VIEW OF THE CHEST 9/9/2021 4:46 pm COMPARISON: 02/07/2021 HISTORY: ORDERING SYSTEM PROVIDED HISTORY: Sepsis TECHNOLOGIST PROVIDED HISTORY: Sepsis Reason for Exam: Upright port, sepsis, nausea FINDINGS: Stable left basal fibrosis. The cardiac size is normal. No acute infiltrates or pleural effusions are seen. Pulmonary vascularity appears normal. There is mild ectasia of the thoracic aorta. There are degenerative changes in the spine and shoulders. No acute bony abnormalities. The hilar structures are normal.     No acute cardiopulmonary disease       EKG  EKG Interpretation    Interpreted by me    Rhythm: normal sinus   Rate: normal  Axis: normal  Ectopy: none  Conduction: normal  ST Segments: no acute change  T Waves: no acute change  Q Waves: none    Clinical Impression: no acute changes and normal EKG, similar to prior EKG    All EKG's are interpreted by the Emergency Department Physician who either signs or Co-signs this chart in the absence of a cardiologist.    EMERGENCY DEPARTMENT COURSE:  Patient came to emergency department, HPI and physical exam were conducted. All nursing notes were reviewed. Chest x-ray found no evidence of pneumonia, procalcitonin within normal limits, patient does have UTI, will admit patient to [facility] for further management of patient's UTI and further evaluation for potential sepsis. Given patient's history and abdominal pain, will obtain CT abdomen pelvis for further evaluation.       PROCEDURES:      CONSULTS:  IP CONSULT TO INTERNAL MEDICINE  PHARMACY TO DOSE VANCOMYCIN    CRITICAL CARE:      FINAL IMPRESSION      1. Acute cystitis without hematuria    2. Septicemia ([facility] [address]          DISPOSITION / PLAN     DISPOSITION Admitted 09/09/2021 10:26:37 PM      PATIENT REFERRED TO:  No follow-up provider specified.     DISCHARGE MEDICATIONS:  Current Discharge Medication List          Tari Salgado MD  Emergency Medicine Resident    (Please note that portions of thisnote were completed with a voice recognition program.  Efforts were made to edit the dictations but occasionally words are mis-transcribed.)        Tari Salgado MD  Resident  09/10/21 [facility] 46 Tammy Frazier MD  Resident  09/10/21 [id number]

## 2021-09-10 ENCOUNTER — APPOINTMENT (OUTPATIENT)
Dept: GENERAL RADIOLOGY | Age: 76
DRG: 872 | End: 2021-09-10
Payer: MEDICARE

## 2021-09-10 LAB
ALBUMIN SERPL-MCNC: 3.5 G/DL (ref 3.5–5.2)
ALBUMIN/GLOBULIN RATIO: 1.2 (ref 1–2.5)
ALP BLD-CCNC: 132 U/L (ref 35–104)
ALT SERPL-CCNC: 21 U/L (ref 5–33)
ANION GAP SERPL CALCULATED.3IONS-SCNC: 14 MMOL/L (ref 9–17)
ANION GAP SERPL CALCULATED.3IONS-SCNC: 14 MMOL/L (ref 9–17)
AST SERPL-CCNC: 17 U/L
BILIRUB SERPL-MCNC: 0.52 MG/DL (ref 0.3–1.2)
BUN BLDV-MCNC: 13 MG/DL (ref 8–23)
BUN BLDV-MCNC: 14 MG/DL (ref 8–23)
BUN/CREAT BLD: ABNORMAL (ref 9–20)
BUN/CREAT BLD: ABNORMAL (ref 9–20)
CALCIUM IONIZED: 0.98 MMOL/L (ref 1.13–1.33)
CALCIUM SERPL-MCNC: 8.5 MG/DL (ref 8.6–10.4)
CALCIUM SERPL-MCNC: 8.7 MG/DL (ref 8.6–10.4)
CHLORIDE BLD-SCNC: 100 MMOL/L (ref 98–107)
CHLORIDE BLD-SCNC: 104 MMOL/L (ref 98–107)
CO2: 18 MMOL/L (ref 20–31)
CO2: 19 MMOL/L (ref 20–31)
CREAT SERPL-MCNC: 0.94 MG/DL (ref 0.5–0.9)
CREAT SERPL-MCNC: 1.04 MG/DL (ref 0.5–0.9)
EKG ATRIAL RATE: 90 BPM
EKG P AXIS: 45 DEGREES
EKG P-R INTERVAL: 156 MS
EKG Q-T INTERVAL: 398 MS
EKG QRS DURATION: 80 MS
EKG QTC CALCULATION (BAZETT): 486 MS
EKG R AXIS: 41 DEGREES
EKG VENTRICULAR RATE: 90 BPM
GFR AFRICAN AMERICAN: >60 ML/MIN
GFR AFRICAN AMERICAN: >60 ML/MIN
GFR NON-AFRICAN AMERICAN: 52 ML/MIN
GFR NON-AFRICAN AMERICAN: 58 ML/MIN
GFR SERPL CREATININE-BSD FRML MDRD: ABNORMAL ML/MIN/{1.73_M2}
GLUCOSE BLD-MCNC: 111 MG/DL (ref 70–99)
GLUCOSE BLD-MCNC: 126 MG/DL (ref 70–99)
HCT VFR BLD CALC: 41.5 % (ref 36.3–47.1)
HEMOGLOBIN: 12.9 G/DL (ref 11.9–15.1)
INR BLD: 1.1
LACTIC ACID, SEPSIS WHOLE BLOOD: 2.7 MMOL/L (ref 0.5–1.9)
LACTIC ACID, SEPSIS: ABNORMAL MMOL/L (ref 0.5–1.9)
LACTIC ACID, WHOLE BLOOD: 1.6 MMOL/L (ref 0.7–2.1)
MAGNESIUM: 1.9 MG/DL (ref 1.6–2.6)
MCH RBC QN AUTO: 27.9 PG (ref 25.2–33.5)
MCHC RBC AUTO-ENTMCNC: 31.1 G/DL (ref 28.4–34.8)
MCV RBC AUTO: 89.6 FL (ref 82.6–102.9)
NRBC AUTOMATED: 0 PER 100 WBC
PDW BLD-RTO: 16.1 % (ref 11.8–14.4)
PHOSPHORUS: 3.2 MG/DL (ref 2.6–4.5)
PLATELET # BLD: 166 K/UL (ref 138–453)
PMV BLD AUTO: 10.8 FL (ref 8.1–13.5)
POTASSIUM SERPL-SCNC: 3.7 MMOL/L (ref 3.7–5.3)
POTASSIUM SERPL-SCNC: 3.7 MMOL/L (ref 3.7–5.3)
PROTHROMBIN TIME: 12.1 SEC (ref 9.1–12.3)
RBC # BLD: 4.63 M/UL (ref 3.95–5.11)
SODIUM BLD-SCNC: 133 MMOL/L (ref 135–144)
SODIUM BLD-SCNC: 136 MMOL/L (ref 135–144)
TOTAL PROTEIN: 6.4 G/DL (ref 6.4–8.3)
WBC # BLD: 13.2 K/UL (ref 3.5–11.3)

## 2021-09-10 PROCEDURE — APPSS45 APP SPLIT SHARED TIME 31-45 MINUTES: Performed by: NURSE PRACTITIONER

## 2021-09-10 PROCEDURE — 99222 1ST HOSP IP/OBS MODERATE 55: CPT | Performed by: FAMILY MEDICINE

## 2021-09-10 PROCEDURE — 6370000000 HC RX 637 (ALT 250 FOR IP): Performed by: FAMILY MEDICINE

## 2021-09-10 PROCEDURE — 2580000003 HC RX 258: Performed by: FAMILY MEDICINE

## 2021-09-10 PROCEDURE — 85610 PROTHROMBIN TIME: CPT

## 2021-09-10 PROCEDURE — 84100 ASSAY OF PHOSPHORUS: CPT

## 2021-09-10 PROCEDURE — 6360000002 HC RX W HCPCS: Performed by: INTERNAL MEDICINE

## 2021-09-10 PROCEDURE — 1200000000 HC SEMI PRIVATE

## 2021-09-10 PROCEDURE — 6370000000 HC RX 637 (ALT 250 FOR IP): Performed by: NURSE PRACTITIONER

## 2021-09-10 PROCEDURE — 83735 ASSAY OF MAGNESIUM: CPT

## 2021-09-10 PROCEDURE — 2580000003 HC RX 258: Performed by: NURSE PRACTITIONER

## 2021-09-10 PROCEDURE — 82330 ASSAY OF CALCIUM: CPT

## 2021-09-10 PROCEDURE — 94760 N-INVAS EAR/PLS OXIMETRY 1: CPT

## 2021-09-10 PROCEDURE — 80048 BASIC METABOLIC PNL TOTAL CA: CPT

## 2021-09-10 PROCEDURE — 83605 ASSAY OF LACTIC ACID: CPT

## 2021-09-10 PROCEDURE — 93005 ELECTROCARDIOGRAM TRACING: CPT | Performed by: FAMILY MEDICINE

## 2021-09-10 PROCEDURE — 80053 COMPREHEN METABOLIC PANEL: CPT

## 2021-09-10 PROCEDURE — 85027 COMPLETE CBC AUTOMATED: CPT

## 2021-09-10 PROCEDURE — 97166 OT EVAL MOD COMPLEX 45 MIN: CPT

## 2021-09-10 PROCEDURE — 94640 AIRWAY INHALATION TREATMENT: CPT

## 2021-09-10 PROCEDURE — 36415 COLL VENOUS BLD VENIPUNCTURE: CPT

## 2021-09-10 PROCEDURE — 71045 X-RAY EXAM CHEST 1 VIEW: CPT

## 2021-09-10 PROCEDURE — 6360000002 HC RX W HCPCS: Performed by: NURSE PRACTITIONER

## 2021-09-10 PROCEDURE — 2580000003 HC RX 258: Performed by: INTERNAL MEDICINE

## 2021-09-10 PROCEDURE — 2700000000 HC OXYGEN THERAPY PER DAY

## 2021-09-10 PROCEDURE — 97535 SELF CARE MNGMENT TRAINING: CPT

## 2021-09-10 RX ORDER — 0.9 % SODIUM CHLORIDE 0.9 %
500 INTRAVENOUS SOLUTION INTRAVENOUS ONCE
Status: COMPLETED | OUTPATIENT
Start: 2021-09-10 | End: 2021-09-10

## 2021-09-10 RX ORDER — 0.9 % SODIUM CHLORIDE 0.9 %
1000 INTRAVENOUS SOLUTION INTRAVENOUS ONCE
Status: COMPLETED | OUTPATIENT
Start: 2021-09-10 | End: 2021-09-10

## 2021-09-10 RX ORDER — 0.9 % SODIUM CHLORIDE 0.9 %
1000 INTRAVENOUS SOLUTION INTRAVENOUS ONCE
Status: DISCONTINUED | OUTPATIENT
Start: 2021-09-10 | End: 2021-09-10

## 2021-09-10 RX ORDER — BUDESONIDE AND FORMOTEROL FUMARATE DIHYDRATE 160; 4.5 UG/1; UG/1
2 AEROSOL RESPIRATORY (INHALATION) 2 TIMES DAILY
Status: DISCONTINUED | OUTPATIENT
Start: 2021-09-10 | End: 2021-09-15 | Stop reason: HOSPADM

## 2021-09-10 RX ADMIN — QUETIAPINE FUMARATE 25 MG: 25 TABLET ORAL at 09:00

## 2021-09-10 RX ADMIN — PANTOPRAZOLE SODIUM 40 MG: 40 TABLET, DELAYED RELEASE ORAL at 20:49

## 2021-09-10 RX ADMIN — CLONAZEPAM 0.25 MG: 0.5 TABLET ORAL at 02:49

## 2021-09-10 RX ADMIN — PANTOPRAZOLE SODIUM 40 MG: 40 TABLET, DELAYED RELEASE ORAL at 09:00

## 2021-09-10 RX ADMIN — BUDESONIDE AND FORMOTEROL FUMARATE DIHYDRATE 2 PUFF: 160; 4.5 AEROSOL RESPIRATORY (INHALATION) at 21:22

## 2021-09-10 RX ADMIN — POLYETHYLENE GLYCOL 3350 17 G: 17 POWDER, FOR SOLUTION ORAL at 09:00

## 2021-09-10 RX ADMIN — AMITRIPTYLINE HYDROCHLORIDE 25 MG: 25 TABLET, FILM COATED ORAL at 20:49

## 2021-09-10 RX ADMIN — AMITRIPTYLINE HYDROCHLORIDE 25 MG: 25 TABLET, FILM COATED ORAL at 01:58

## 2021-09-10 RX ADMIN — BUSPIRONE HYDROCHLORIDE 20 MG: 10 TABLET ORAL at 09:01

## 2021-09-10 RX ADMIN — SUCRALFATE 1 G: 1 TABLET ORAL at 20:48

## 2021-09-10 RX ADMIN — BUSPIRONE HYDROCHLORIDE 20 MG: 10 TABLET ORAL at 20:49

## 2021-09-10 RX ADMIN — CEFEPIME HYDROCHLORIDE 2000 MG: 2 INJECTION, POWDER, FOR SOLUTION INTRAVENOUS at 20:49

## 2021-09-10 RX ADMIN — VANCOMYCIN HYDROCHLORIDE 750 MG: 10 INJECTION, POWDER, LYOPHILIZED, FOR SOLUTION INTRAVENOUS at 12:15

## 2021-09-10 RX ADMIN — SUCRALFATE 1 G: 1 TABLET ORAL at 17:24

## 2021-09-10 RX ADMIN — TRAZODONE HYDROCHLORIDE 100 MG: 100 TABLET ORAL at 01:58

## 2021-09-10 RX ADMIN — ANTACID TABLETS 500 MG: 500 TABLET, CHEWABLE ORAL at 01:10

## 2021-09-10 RX ADMIN — PANTOPRAZOLE SODIUM 40 MG: 40 TABLET, DELAYED RELEASE ORAL at 01:58

## 2021-09-10 RX ADMIN — GABAPENTIN 300 MG: 300 CAPSULE ORAL at 09:00

## 2021-09-10 RX ADMIN — METOCLOPRAMIDE 5 MG: 10 TABLET ORAL at 06:27

## 2021-09-10 RX ADMIN — SUCRALFATE 1 G: 1 TABLET ORAL at 12:14

## 2021-09-10 RX ADMIN — TRAZODONE HYDROCHLORIDE 100 MG: 100 TABLET ORAL at 20:48

## 2021-09-10 RX ADMIN — METOCLOPRAMIDE 5 MG: 10 TABLET ORAL at 12:14

## 2021-09-10 RX ADMIN — CLONAZEPAM 0.25 MG: 0.5 TABLET ORAL at 09:01

## 2021-09-10 RX ADMIN — SUCRALFATE 1 G: 1 TABLET ORAL at 01:58

## 2021-09-10 RX ADMIN — ANTACID TABLETS 500 MG: 500 TABLET, CHEWABLE ORAL at 13:01

## 2021-09-10 RX ADMIN — SODIUM CHLORIDE 1000 ML: 9 INJECTION, SOLUTION INTRAVENOUS at 15:28

## 2021-09-10 RX ADMIN — CEFEPIME HYDROCHLORIDE 2000 MG: 2 INJECTION, POWDER, FOR SOLUTION INTRAVENOUS at 09:01

## 2021-09-10 RX ADMIN — SODIUM CHLORIDE 500 ML: 9 INJECTION, SOLUTION INTRAVENOUS at 04:27

## 2021-09-10 RX ADMIN — QUETIAPINE FUMARATE 25 MG: 25 TABLET ORAL at 01:58

## 2021-09-10 RX ADMIN — QUETIAPINE FUMARATE 25 MG: 25 TABLET ORAL at 20:48

## 2021-09-10 RX ADMIN — SODIUM CHLORIDE: 9 INJECTION, SOLUTION INTRAVENOUS at 01:23

## 2021-09-10 RX ADMIN — GABAPENTIN 300 MG: 300 CAPSULE ORAL at 14:48

## 2021-09-10 RX ADMIN — METOCLOPRAMIDE 5 MG: 10 TABLET ORAL at 17:24

## 2021-09-10 RX ADMIN — ENOXAPARIN SODIUM 40 MG: 40 INJECTION SUBCUTANEOUS at 09:01

## 2021-09-10 RX ADMIN — BUSPIRONE HYDROCHLORIDE 20 MG: 10 TABLET ORAL at 01:58

## 2021-09-10 RX ADMIN — CLONAZEPAM 0.25 MG: 0.5 TABLET ORAL at 20:48

## 2021-09-10 RX ADMIN — ACETAMINOPHEN 650 MG: 325 TABLET ORAL at 18:49

## 2021-09-10 RX ADMIN — ATORVASTATIN CALCIUM 20 MG: 20 TABLET, FILM COATED ORAL at 09:01

## 2021-09-10 RX ADMIN — FERROUS SULFATE TAB EC 325 MG (65 MG FE EQUIVALENT) 325 MG: 325 (65 FE) TABLET DELAYED RESPONSE at 09:00

## 2021-09-10 RX ADMIN — SUCRALFATE 1 G: 1 TABLET ORAL at 09:00

## 2021-09-10 RX ADMIN — SODIUM CHLORIDE, PRESERVATIVE FREE 10 ML: 5 INJECTION INTRAVENOUS at 01:00

## 2021-09-10 RX ADMIN — BUSPIRONE HYDROCHLORIDE 20 MG: 10 TABLET ORAL at 14:48

## 2021-09-10 RX ADMIN — GABAPENTIN 300 MG: 300 CAPSULE ORAL at 20:49

## 2021-09-10 RX ADMIN — ACETAMINOPHEN 650 MG: 325 TABLET ORAL at 10:01

## 2021-09-10 RX ADMIN — GABAPENTIN 300 MG: 300 CAPSULE ORAL at 01:58

## 2021-09-10 ASSESSMENT — PAIN DESCRIPTION - PAIN TYPE
TYPE: ACUTE PAIN
TYPE: ACUTE PAIN

## 2021-09-10 ASSESSMENT — PAIN SCALES - GENERAL
PAINLEVEL_OUTOF10: 5
PAINLEVEL_OUTOF10: 3
PAINLEVEL_OUTOF10: 0
PAINLEVEL_OUTOF10: 3
PAINLEVEL_OUTOF10: 5

## 2021-09-10 ASSESSMENT — ENCOUNTER SYMPTOMS
ABDOMINAL DISTENTION: 0
TROUBLE SWALLOWING: 0
CONSTIPATION: 0
PHOTOPHOBIA: 0
ABDOMINAL PAIN: 1
NAUSEA: 1
SORE THROAT: 0
COUGH: 0
VOMITING: 0
ABDOMINAL PAIN: 0
CHEST TIGHTNESS: 0
BLOOD IN STOOL: 0
RHINORRHEA: 1
WHEEZING: 0
SHORTNESS OF BREATH: 0
DIARRHEA: 0

## 2021-09-10 ASSESSMENT — PAIN DESCRIPTION - LOCATION
LOCATION: ABDOMEN
LOCATION: ABDOMEN

## 2021-09-10 NOTE — PROGRESS NOTES
Occupational Therapy   Occupational Therapy Initial Assessment  Date: 9/10/2021   Patient Name: Felton Diop  MRN: [id number]     : 1945    Date of Service: 9/10/2021    Discharge Recommendations:  Patient would benefit from continued therapy after discharge     Copied from Internal Medicine: This is a 76 yr old female with large hiatal hernia s/p robotic repair, depression/anxiety, HTN, HLD, MVP,GERD and recurrent and MDRO UTI who presents from her ECF with generalized malaise, subjective fevers, fatigue and nausea. She denies any CP, SOB, dysuria/hematuria or changes in urinary habits, vomiting/diarrhea HA/dizziness or light headedness. Pertinent presenting labs demonstrate elevated Lactic Acid: 2.2, mildly elevated serum creatinine and leukocytosis. UA demonstrating concerns for acute cystitis. Patient was given 1L IV fluid bolus in the ER and started on broad spectrum antibiotics. Patient is admitted to [facility] for further management of UTI. Assessment    Pt lying supine in bed upon entrance to room. Pt completed bed mobility with min assistance. Pt sat at eob 30 minutes with good unsupported sitting balance with forward flexed posture. Sit to stand with min assistance. Pt completed a transfer to bedside commode with min assistance. Please refer to below assist levels for adl completion. Pt ed on OT POC, safety awareness tech, proper hand placement for transfers,  with fair return. Pt retired supine in bed with call light and phone in reach, bed alarm activated. All needs met upon exit. Performance deficits / Impairments: Decreased functional mobility ; Decreased ADL status; Decreased endurance;Decreased high-level IADLs;Decreased balance  Treatment Diagnosis: Septicemia  Prognosis: Fair  Decision Making: Medium Complexity  OT Education: OT Role;Plan of Care  Patient Education: Pt ed on OT POC, safety awareness tech, proper hand placement for transfers,  with fair return.   REQUIRES OT FOLLOW UP: Yes  Activity Tolerance  Activity Tolerance: Patient Tolerated treatment well  Safety Devices  Safety Devices in place: Yes  Type of devices: Call light within reach; Left in bed;Bed alarm in place  Restraints  Initially in place: No         Patient Diagnosis(es): The primary encounter diagnosis was Acute cystitis without hematuria. A diagnosis of Septicemia ([address] was also pertinent to this visit. has a past medical history of Anxiety, Arthritis, Back pain, Bronchitis, Caffeine use, Carpal tunnel syndrome, Cataracts, bilateral, Constipation, Depression, Diarrhea, GERD (gastroesophageal reflux disease), H/O gastric ulcer, Hyperlipidemia, Hypertension, Mumps, MVP (mitral valve prolapse), Recurrent UTI, Sinusitis, Tracheostomy in place [facility]), Ulcerative esophagitis, and Wellness examination. has a past surgical history that includes Hysterectomy; total nephrectomy; Tonsillectomy; Appendectomy; Cystoscopy; Ovary removal; Tubal ligation; rhinoplasty; Carpal tunnel release; Hand surgery; Total hip arthroplasty; eye surgery; Colonoscopy; joint replacement (Right); Gastric fundoplication (N/A, 24/18/6095); hc cath power picc triple (03/04/2020); Upper gastrointestinal endoscopy (N/A, 03/17/2020); tracheostomy (N/A, 03/27/2020); Gastrostomy tube placement (N/A, 03/27/2020); tracheostomy (N/A, 04/02/2020); Upper gastrointestinal endoscopy (N/A, 05/18/2020); Upper gastrointestinal endoscopy (05/18/2020); ERCP (05/21/2020); ERCP (05/21/2020); ERCP (05/21/2020); ERCP (05/21/2020); Cholecystectomy, laparoscopic (N/A, 05/22/2020); Upper gastrointestinal endoscopy (N/A, 07/06/2020); Upper gastrointestinal endoscopy (N/A, 11/09/2020); Upper gastrointestinal endoscopy (02/08/2021); Gastrostomy tube placement (N/A, 2/8/2021); ERCP (N/A, 2/8/2021); and ERCP (2/8/2021).     Treatment Diagnosis: Septicemia      Restrictions  Restrictions/Precautions  Restrictions/Precautions: Fall Risk, Up as Tolerated  Position Activity Restriction  Other position/activity restrictions: up with assistance    Subjective   General  Patient assessed for rehabilitation services?: Yes  Family / Caregiver Present: No  Patient Currently in Pain: Yes  Pain Assessment  Pain Assessment: 0-10  Pain Level: 5  Pain Type: Acute pain  Pain Location: Abdomen  Non-Pharmaceutical Pain Intervention(s): Repositioned; Ambulation/Increased Activity; Therapeutic presence;Distraction  Vital Signs  Patient Currently in Pain: Yes  Oxygen Therapy  Pulse Oximeter Device Mode: Intermittent  O2 Device: Nasal cannula (Simultaneous filing. User may not have seen previous data.)  O2 Flow Rate (L/min): 2 L/min (Simultaneous filing. User may not have seen previous data.)  Patient Observation  Observations: Pt reports no 02 use at facility  Social/Functional History  Social/Functional History  Lives With: Other (comment)  Type of Home: Facility (Pt has been at [facility] for ~ 1 year)  Home Layout: One level  Home Access: Level entry  Bathroom Shower/Tub: Curtain, Walk-in shower  Bathroom Toilet: Handicap height  Home Equipment: [employer], Rolling walker  Receives Help From: Personal care attendant  ADL Assistance: Needs assistance (pt reports being I with UB, assist needed for LB)  Homemaking Responsibilities: No (Facility completes all meals, laundry, and cleaning of room)  Ambulation Assistance: Needs assistance  Transfer Assistance: Independent (pt reports I with transferring  from bed to w/c and back along with w/c to toilet and back)  Active : No  Patient's  Info: Pt reports son drives; Arthurine Deck service at facility for dr underwood  Mode of Transportation: Car, [employer]  Occupation: Retired  Type of occupation: [employer]  Leisure & Hobbies: Play games on [address] [employer] [address] especially [address]  Additional Comments: Pt has 2 sons only one in the area, other son in [facility]. Son takes pt over to sons home every Friday for a couple of hours.  Pt states was receiving restorative care 3 x week     Objective   Vision: Impaired  Vision Exceptions: Wears glasses for reading (glasses present in hospital)  Hearing: Exceptions to [facility]  Hearing Exceptions: Hard of hearing/hearing concerns (L ear is better than R ear)    Orientation  Overall Orientation Status: Within Functional Limits     Balance  Sitting Balance: Supervision  Standing Balance: Minimal assistance  Standing Balance  Time: ~4 min  Activity: static and dynamic  Functional Mobility  Functional - Mobility Device:   Assist Level: Minimal assistance  Toilet Transfers  Toilet - Technique: Stand step  Equipment Used: Standard bedside commode  Toilet Transfer: Minimal assistance  ADL  Feeding: Independent;Setup  Grooming: Independent;Setup; Increased time to complete  UE Bathing: Minimal assistance;Setup; Increased time to complete (assist for back)  LE Bathing: Moderate assistance;Setup; Increased time to complete (pt has difficulty reaching below B knees)  UE Dressing: Minimal assistance;Setup (assist to tie gown in back)  LE Dressing: Moderate assistance;Setup; Increased time to complete (assist to thread pants and pull up to B knees. Pt with increased difficulty donning/doffing B socks with bending and figure 4 tech)  Toileting: Contact guard assistance  Tone RUE  RUE Tone: Normotonic  Tone LUE  LUE Tone: Normotonic  Coordination  Movements Are Fluid And Coordinated: Yes     Bed mobility  Rolling to Left: Contact guard assistance  Supine to Sit: Minimal assistance  Sit to Supine: Minimal assistance  Comment: assist for trunk progression with HOB elevated  Transfers  Sit to stand: Minimal assistance  Stand to sit: Minimal assistance     Cognition  Overall Cognitive Status: WFL     Sensation  Overall Sensation Status: WFL (denies numbness/tingling B hands.  Pt does reprots numbness B feet constant)      LUE PROM (degrees)  LUE PROM: WFL  LUE AROM (degrees)  LUE AROM : WFL  Left Hand PROM (degrees)  Left Hand PROM: [facility]  Left Hand AROM (degrees)  Left Hand AROM: WFL  RUE PROM (degrees)  RUE PROM: WFL  RUE AROM (degrees)  RUE AROM : WFL  Right Hand PROM (degrees)  Right Hand PROM: WFL  Right Hand AROM (degrees)  Right Hand AROM: WFL  LUE Strength  Gross LUE Strength: WFL  L Hand General: 3+/5  LUE Strength Comment: 4-/5 overall muscle strength  RUE Strength  Gross RUE Strength: WFL  R Hand General: 3+/5  RUE Strength Comment: 4-/5 overall muscle strength      Plan   Plan  Times per week: 3-4 x week       AM-PAC Score  AM-PAC Inpatient Daily Activity Raw Score: 20 (09/10/21 1105)  AM-PAC Inpatient ADL T-Scale Score : 42.03 (09/10/21 1105)  ADL Inpatient CMS 0-100% Score: 38.32 (09/10/21 1105)  ADL Inpatient CMS G-Code Modifier : Prudence Crutch (09/10/21 1105)    Goals  Short term goals  Time Frame for Short term goals: Pt will, by discharge:  Short term goal 1: Dem I with bed mobility  Short term goal 2: Dem I with functional transfers  Short term goal 3: Dem cga with dynamic mobility with LRD for adl completion  Short term goal 4: Dem a 10 min dynamic task with cga to increase activity tolerance  Short term goal 5: Dem min a for adl performance       Therapy Time   Individual Concurrent Group Co-treatment   Time In 0830         Time Out 0911         Minutes 41         Timed Code Treatment Minutes: [address], OTR/L

## 2021-09-10 NOTE — PLAN OF CARE
Problem: Skin Integrity:  Goal: Will show no infection signs and symptoms  Description: Will show no infection signs and symptoms  Outcome: Ongoing  Goal: Absence of new skin breakdown  Description: Absence of new skin breakdown  Outcome: Ongoing     Problem: Falls - Risk of:  Goal: Will remain free from falls  Description: Will remain free from falls  Outcome: Ongoing  Goal: Absence of physical injury  Description: Absence of physical injury  Outcome: Ongoing     Problem: Pain:  Goal: Pain level will decrease  Description: Pain level will decrease  Outcome: Ongoing  Goal: Control of acute pain  Description: Control of acute pain  Outcome: Ongoing  Goal: Control of chronic pain  Description: Control of chronic pain  Outcome: Ongoing     Problem: Coping:  Goal: Expressions of feelings of enhanced comfort will increase  Description: Expressions of feelings of enhanced comfort will increase  Outcome: Ongoing     Problem: Urinary Elimination:  Goal: Ability to achieve a balanced intake and output will improve  Description: Ability to achieve a balanced intake and output will improve  Outcome: Ongoing  Goal: Ability to recognize the need to void and respond appropriately will improve  Description: Ability to recognize the need to void and respond appropriately will improve  Outcome: Ongoing     Problem: Discharge Planning:  Goal: Discharged to appropriate level of care  Description: Discharged to appropriate level of care  Outcome: Ongoing     Problem: Gas Exchange - Impaired:  Goal: Levels of oxygenation will improve  Description: Levels of oxygenation will improve  Outcome: Ongoing     Problem: Infection, Septic Shock:  Goal: Will show no infection signs and symptoms  Description: Will show no infection signs and symptoms  Outcome: Ongoing     Problem: Tissue Perfusion, Altered:  Goal: Circulatory function within specified parameters  Description: Circulatory function within specified parameters  Outcome: Ongoing

## 2021-09-10 NOTE — CARE COORDINATION
Transitional Planning:    Spoke to [facility] in admissions at [facility]. They can accept pt back. She has a long term bed there and can return when discharged. Pt does not need a COVID test.  If pt is discharged over the weekend call [phone]. Weekend fax:  [phone].

## 2021-09-10 NOTE — ED PROVIDER NOTES
[facility]     Emergency Department     Faculty Attestation    I performed a history and physical examination of the patient and discussed management with the resident. I reviewed the residents note and agree with the documented findings including all diagnostic interpretations and plan of care. Any areas of disagreement are noted on the chart. I was personally present for the key portions of any procedures. I have documented in the chart those procedures where I was not present during the key portions. I have reviewed the emergency nurses triage note. I agree with the chief complaint, past medical history, past surgical history, allergies, medications, social and family history as documented unless otherwise noted below. Documentation of the HPI, Physical Exam and Medical Decision Making performed by scribes is based on my personal performance of the HPI, PE and MDM. For Physician Assistant/ Nurse Practitioner cases/documentation I have personally evaluated this patient and have completed at least one if not all key elements of the E/M (history, physical exam, and MDM). Additional findings are as noted. This patient was evaluated in the Emergency Department for symptoms described in the history of present illness. He/she was evaluated in the context of the global COVID-19 pandemic, which necessitated consideration that the patient might be at risk for infection with the SARS-CoV-2 virus that causes COVID-19. Institutional protocols and algorithms that pertain to the evaluation of patients at risk for COVID-19 are in a state of rapid change based on information released by regulatory bodies including the CDC and federal and state organizations. These policies and algorithms were followed during the patient's care in the ED. Primary Care Physician: Reza Louie MD    History:  This is a 76 y.o. female who presents to the Emergency Department with complaint of fever, general ill feeling. Worsening over the past 2 days. Does report cough as well as abdominal pain. No dysuria some nausea with this    Physical:     height is 5' 2\" (1.575 m) and weight is 177 lb (80.3 kg). Her oral temperature is 98.2 °F (36.8 °C). Her blood pressure is 118/59 (abnormal) and her pulse is 77. Her respiration is 22 and oxygen saturation is 90%. 76 y.o. female ill but nontoxic-appearing, cardiac exam regular rate and rhythm, pulmonary shows mild tachypnea, some occasional scattered Rales on the right lateral chest auscultation. Abdomen is soft diffuse tenderness throughout no rebound no guarding.   No signs of cellulitis on skin exam    Impression: Concerns for sepsis    Plan: Septic work-up, antibiotics, Covid swab, probable admission given age and risk factors    EKG Interpretation    Interpreted by me    Rhythm: normal sinus   Rate: normal  Axis: normal  Ectopy: none  Conduction: normal  ST Segments: no acute change  T Waves: Baseline artifact, no discrete focal T wave abnormalities  Q Waves: none    Clinical Impression: Nonspecific      Vonda Conrad MD, Cortez Antonio  Attending Emergency Physician        Barbara Keane MD  09/10/21 [id number]

## 2021-09-10 NOTE — PLAN OF CARE
Problem: Skin Integrity:  Goal: Will show no infection signs and symptoms  Description: Will show no infection signs and symptoms  9/10/2021 1808 by Enid Bowman RN  Outcome: Ongoing     Problem: Skin Integrity:  Goal: Absence of new skin breakdown  Description: Absence of new skin breakdown  9/10/2021 1808 by Enid Bowman RN  Outcome: Ongoing     Problem: Falls - Risk of:  Goal: Will remain free from falls  Description: Will remain free from falls  9/10/2021 1808 by Enid Bowman RN  Outcome: Ongoing

## 2021-09-10 NOTE — PROGRESS NOTES
Pharmacy Note  Vancomycin Consult    Cara Hancock is a 76 y.o. female started on Vancomycin for UTI; consult received from Dr. Judy Cha to manage therapy. Also receiving the following antibiotics: cefepime. Patient Active Problem List   Diagnosis    Frequency of urination    Back pain    GERD (gastroesophageal reflux disease)    MVP (mitral valve prolapse)    Depression    Anxiety    BENEDICT (acute kidney injury) ([facility] [address]    Dysphagia    Hiatal hernia    History of repair of hiatal hernia    Centrilobular emphysema (HCC)    Esophageal dilatation    Shock ([facility] [address]    Fever    Critical illness polyneuropathy ([facility] [address]    Gastric outlet obstruction    Recurrent UTI    Tracheostomy in place [facility])    H/O gastric ulcer    Hyperlipidemia    Hypertension    Tobacco abuse    Chronic respiratory failure with hypoxia ([facility])    S/P percutaneous endoscopic gastrostomy (PEG) tube placement ([facility])    S/P Nissen fundoplication (without gastrostomy tube) procedure    Acute blood loss anemia    Phlebitis after infusion    Esophagitis determined by endoscopy    Expressive aphasia    Transient speech disturbance    Speech disturbance    Coffee ground emesis    Acute cystitis without hematuria    Ulcerative esophagitis    Sepsis ([facility])     Allergies:  Prednisone, Compazine [prochlorperazine maleate], Penicillin v potassium, and Penicillins     Temp max: 101.8 F  (38.8 C)    Recent Labs     09/09/21  1935   BUN 14   CREATININE 0.94*   WBC 12.6*     No intake or output data in the 24 hours ending 09/09/21 2255  Culture Date      Source                       Results       Ht Readings from Last 1 Encounters:   09/09/21 5' 2\" (1.575 m)        Wt Readings from Last 1 Encounters:   09/09/21 177 lb (80.3 kg)       Body mass index is 32.37 kg/m². Estimated Creatinine Clearance: 51 mL/min (A) (based on SCr of 0.94 mg/dL (H)).     Goal of AUC: 400-600 mg/L.hr  Goal Trough Level: 10-20 mcg/mL    Assessment/Plan:  Will initiate Vancomycin with a one time loading dose of 1250 mg x1, followed by 750 mg IV every 12 hours. Timing of trough level will be determined based on culture results, renal function, and clinical response. Thank you for the consult. Will continue to follow.

## 2021-09-10 NOTE — H&P
[facility]  Office: [address], DO, Tamika Carneysar, DO, Dyllan Narrow, DO, Will Sangitamiya Blood, DO, Karl Ugalde MD, Daljit Blair MD, Marco A Cook MD, Monica Magana MD, Saba Canada MD, Jaspal Torres MD, Tess Hernandez MD, Suzanne De Leon, DO, Jerrod Mackey, DO, Kenrick Mendoza MD,  Rodrigo Coleman, DO, Juliana Sellers MD, Milton Evans MD, Freedom Metz MD, Kayleen Richey MD, , Jamie Allen MD, Jey Michael MD, Heather Villar MD, Bonnie Siegel, [facility], [facility], CNP, Gustavo Castillo, CNP, Laura Councilman, CNS, Adilson Frazier, CNP, Ginna Diaz, CNP, Azucena Pan, CNP, Sarah Sidhu, CNP, [place], CNP, Valdo Rinaldi PA-C, Raeann Gama, [facility], Bg Cisneros, CNP, Lakeshia Child, CNP, Trinda Starch, CNP, Henny Jerri, CNP, Carole Kugel, CNP, Anna Copper, CNP, Bre Solorioy, CNP, Kady Frederick, [address]    HISTORY AND PHYSICAL EXAMINATION            Date:   9/10/2021  Patient name:  Briana Liu  Date of admission:  9/9/2021  7:08 PM  MRN:   [id number]  Account:  [de-identified]  YOB: 1945  PCP:    Jorge A Christine MD  Room:   0121/9485-02  Code Status:    Full Code    Chief Complaint:     Chief Complaint   Patient presents with    Fever    Fatigue    Cough       History Obtained From:     patient, electronic medical record    History of Present Illness:     Briana Liu is a 76 y.o. Non- / non  female who presents with Fever, Fatigue, and Cough   and is admitted to the hospital for the management of Sepsis ([facility] [address] This is a 76 yr old female with large hiatal hernia s/p robotic repair, depression/anxiety, HTN, HLD, MVP,GERD and recurrent and MDRO UTI who presents from her ECF with generalized malaise, subjective fevers, fatigue and nausea. She denies any CP, SOB, dysuria/hematuria or changes in urinary habits, vomiting/diarrhea HA/dizziness or light headedness.  Pertinent presenting labs demonstrate elevated Lactic Acid: 2.2, mildly elevated serum creatinine and leukocytosis. UA demonstrating concerns for acute cystitis. Patient was given 1L IV fluid bolus in the ER and started on broad spectrum antibiotics. Patient is admitted to [facility] for further management of UTI. On my evaluation, patient is resting in bed and is hemodynamically stable. She is ill but non toxic appearing and without distress. Continues to endorse generalized ABD pain but states is unchanged from her chronic ABD pain. She denies any CP, SOB n/v/d and remains afebrile. Denies any difficulty or new urinary symptoms. Patient does follow outpatient with Dr. Oumar Dye.     Past Medical History:     Past Medical History:   Diagnosis Date    Anxiety     Arthritis     Back pain     Bronchitis     Caffeine use     8 coffee / day    Carpal tunnel syndrome     Cataracts, bilateral     Constipation     Depression     Diarrhea     GERD (gastroesophageal reflux disease)     H/O gastric ulcer     Hyperlipidemia     Hypertension     Mumps     MVP (mitral valve prolapse)     Dr. Lorena Guzman in May 2019    Recurrent UTI     Dr. Conde Macedonian    Sinusitis     Tr[facility] in place [facility])     Ulcerative esophagitis     Wellness examination     Dr. Aziza Navarrete seen in Jan 2020        Past Surgical History:     Past Surgical History:   Procedure Laterality Date    APPENDECTOMY      CARPAL TUNNEL RELEASE      CHOLECYSTECTOMY, LAPAROSCOPIC N/A 05/22/2020    XI LAPAROSCOPIC ROBOTIC CHOLECYSTECTOMY, PYLOROPLASTY performed by Sanya Pathak MD at [facility] ERCP  05/21/2020    with stent insertion, balloon dilation, sphinctereotomy    ERCP  05/21/2020    ** CASE IN OR WITY GI STAFF** ERCP STENT INSERTION performed by Carlos Whittington MD at [facility]    ERCP  05/21/2020    ** CASE IN OR WITH GI STAFF**ERCP SPHINCTER/PAPILLOTOMY performed by Carlos Whittington MD at  [place] [address] at [address] [facility] ENDOSCOPY  02/08/2021    - EGD WITH REMOVAL PEG TUBE,ERCP STENT REMOVAL,ERCP STONE REMOVAL        Medications Prior to Admission:     Prior to Admission medications    Medication Sig Start Date End Date Taking? Authorizing Provider   HYDROcodone-acetaminophen (NORCO) 5-325 MG per tablet Take 1 tablet by mouth every 6 hours as needed for Pain. Yes Historical Provider, MD   metoclopramide (REGLAN) 5 MG tablet Take 5 mg by mouth 3 times daily (before meals) For nausea   Yes Historical Provider, MD   budesonide-formoterol (SYMBICORT) 160-4.5 MCG/ACT AERO Inhale 2 puffs into the lungs 2 times daily   Yes Historical Provider, MD   QUEtiapine (SEROQUEL) 25 MG tablet Take 1 tablet by mouth 2 times daily 12/16/20  Yes MARGOTH Matos NP   furosemide (LASIX) 20 MG tablet Take 1 tablet by mouth daily 12/16/20  Yes MARGOTH Matos NP   gabapentin (NEURONTIN) 300 MG capsule Take 1 capsule by mouth 3 times daily for 30 days. 12/16/20 9/10/21 Yes MARGOTH Matos NP   clonazePAM (KLONOPIN) 0.5 MG tablet Take 0.5 tablets by mouth 2 times daily for 60 days.  Taking 1/2 tablet BID 7/7/20 9/10/21 Yes Lesly Bello MD   pantoprazole (PROTONIX) 40 MG tablet Take 1 tablet by mouth 2 times daily 7/7/20  Yes Lesly Bello MD   amitriptyline (ELAVIL) 25 MG tablet Take 1 tablet by mouth nightly 7/14/20  Yes Lesly Bello MD   busPIRone (BUSPAR) 10 MG tablet Take 2 tablets by mouth 3 times daily 7/14/20  Yes Lesly Bello MD   metoprolol succinate (TOPROL XL) 25 MG extended release tablet Take 1 tablet by mouth daily 5/30/20  Yes Ashley Marino MD   sucralfate (CARAFATE) 1 GM tablet Take 1 tablet by mouth 4 times daily 4/6/20  Yes Venecia Keen MD   traZODone (DESYREL) 100 MG tablet Take 1 tablet by mouth nightly 4/6/20  Yes Venecia Keen MD   RA VITAMIN D-3 50 MCG (2000 UT) CAPS take 1 capsule by mouth once daily 2/14/20  Yes Historical Provider, MD   Oyster Shell 500 MG TABS Take 500 mg by mouth 2 times daily    Yes Historical Provider, MD   calcium carbonate (TUMS) 500 MG chewable tablet Take 1 tablet by mouth 3 times daily as needed for Heartburn   Yes Historical Provider, MD   simvastatin (ZOCOR) 40 MG tablet Take 40 mg by mouth nightly. Yes Historical Provider, MD   bisacodyl (DULCOLAX) 10 MG suppository Place 10 mg rectally daily as needed for Constipation    Historical Provider, MD   Sodium Phosphates (FLEET) 7-19 GM/118ML Place 1 enema rectally As needed after no BM for 5 days    Historical Provider, MD   clotrimazole-betamethasone (LOTRISONE) 1-0.05 % cream Apply topically 2 times daily Apply topically 2 times daily. Historical Provider, MD   magnesium hydroxide (MILK OF MAGNESIA) 400 MG/5ML suspension Take by mouth daily as needed for Constipation    Historical Provider, MD   polyethylene glycol (GLYCOLAX) 17 g packet Take 17 g by mouth every other day    Historical Provider, MD   gabapentin (NEURONTIN) 300 MG capsule Take 1 capsule by mouth 3 times daily for 30 days. 2/10/21 3/12/21  Howard Curry MD   ferrous sulfate (FE TABS 325) 325 (65 Fe) MG EC tablet Take 1 tablet by mouth daily (with breakfast) 12/16/20   MARGOTH Macias - NP   docusate (COLACE) 50 MG/5ML liquid 10 mLs by Per G Tube route 2 times daily 4/6/20   Lianet Ley MD        Allergies:     Prednisone, Compazine [prochlorperazine maleate], Penicillin v potassium, and Penicillins    Social History:     Tobacco:    reports that she has quit smoking. Her smoking use included cigarettes. She has a 50.00 pack-year smoking history. She has never used smokeless tobacco.  Alcohol:      reports no history of alcohol use. Drug Use:  reports no history of drug use.     Family History:     Family History   Problem Relation Age of Onset    Cancer Mother         uterine    Heart Attack Mother     Heart Attack Father     Other Maternal Grandfather         foot gangrene    Other Paternal Grandmother bleeding ulcers    Other Paternal Grandfather         lung cancer       Review of Systems:     Positive and Negative as described in HPI. Review of Systems   Constitutional: Positive for activity change, appetite change, fatigue and fever. HENT: Negative for sore throat and trouble swallowing. Eyes: Negative for photophobia and visual disturbance. Respiratory: Negative for cough, chest tightness and shortness of breath. Cardiovascular: Positive for leg swelling. Negative for chest pain and palpitations. Gastrointestinal: Positive for abdominal pain and nausea. Negative for abdominal distention, diarrhea and vomiting. Endocrine: Negative for polyphagia and polyuria. Genitourinary: Negative for decreased urine volume, difficulty urinating, dysuria, flank pain and hematuria. Musculoskeletal: Negative for arthralgias, myalgias and neck pain. Skin: Positive for pallor. Negative for rash and wound. Neurological: Negative for dizziness, syncope, light-headedness and headaches. Hematological: Negative for adenopathy. Psychiatric/Behavioral: Negative for agitation, behavioral problems and confusion. The patient is not nervous/anxious. Physical Exam:   /60   Pulse 70   Temp 98.2 °F (36.8 °C) (Axillary)   Resp 20   Ht 5' 2\" (1.575 m)   Wt 177 lb (80.3 kg)   SpO2 95%   BMI 32.37 kg/m²   Temp (24hrs), Av.3 °F (37.4 °C), Min:98.2 °F (36.8 °C), Max:101.8 °F (38.8 °C)    No results for input(s): POCGLU in the last 72 hours. Intake/Output Summary (Last 24 hours) at 9/10/2021 0511  Last data filed at 9/10/2021 0449  Gross per 24 hour   Intake --   Output 850 ml   Net -850 ml       Physical Exam  Vitals and nursing note reviewed. Constitutional:       General: She is not in acute distress. Appearance: She is obese. She is ill-appearing. She is not toxic-appearing or diaphoretic. HENT:      Head: Normocephalic and atraumatic.       Right Ear: External ear normal. Left Ear: External ear normal.      Nose: Nose normal. No congestion or rhinorrhea. Mouth/Throat:      Mouth: Mucous membranes are dry. Pharynx: Oropharynx is clear. Eyes:      Extraocular Movements: Extraocular movements intact. Conjunctiva/sclera: Conjunctivae normal.      Pupils: Pupils are equal, round, and reactive to light. Cardiovascular:      Rate and Rhythm: Normal rate and regular rhythm. Pulses: Normal pulses. Heart sounds: Normal heart sounds. No murmur heard. No friction rub. No gallop. Pulmonary:      Effort: Pulmonary effort is normal. No respiratory distress. Breath sounds: Normal breath sounds. No wheezing, rhonchi or rales. Abdominal:      General: Bowel sounds are normal. There is no distension. Palpations: Abdomen is soft. There is no mass. Tenderness: There is abdominal tenderness. Musculoskeletal:         General: No tenderness or signs of injury. Cervical back: Normal range of motion and neck supple. No rigidity or tenderness. Right lower leg: Edema present. Left lower leg: Edema present. Skin:     General: Skin is warm and dry. Capillary Refill: Capillary refill takes less than 2 seconds. Coloration: Skin is pale. Findings: No bruising or lesion. Neurological:      General: No focal deficit present. Mental Status: She is alert and oriented to person, place, and time. Mental status is at baseline. Cranial Nerves: No cranial nerve deficit. Sensory: No sensory deficit. Motor: No weakness. Psychiatric:         Mood and Affect: Mood normal.         Behavior: Behavior normal.         Thought Content:  Thought content normal.         Judgment: Judgment normal.         Investigations:      Laboratory Testing:  Recent Results (from the past 24 hour(s))   CBC Auto Differential    Collection Time: 09/09/21  7:35 PM   Result Value Ref Range    WBC 12.6 (H) 3.5 - 11.3 k/uL    RBC 4.89 3.95 - 5.11 m/uL    Hemoglobin 13.7 11.9 - 15.1 g/dL    Hematocrit 41.6 36.3 - 47.1 %    MCV 85.1 82.6 - 102.9 fL    MCH 28.0 25.2 - 33.5 pg    MCHC 32.9 28.4 - 34.8 g/dL    RDW 15.9 (H) 11.8 - 14.4 %    Platelets 150 498 - 894 k/uL    MPV 11.0 8.1 - 13.5 fL    NRBC Automated 0.0 0.0 per 100 WBC    Differential Type NOT REPORTED     Seg Neutrophils 86 (H) 36 - 65 %    Lymphocytes 6 (L) 24 - 43 %    Monocytes 7 3 - 12 %    Eosinophils % 0 (L) 1 - 4 %    Basophils 0 0 - 2 %    Immature Granulocytes 1 (H) 0 %    Segs Absolute 10.79 (H) 1.50 - 8.10 k/uL    Absolute Lymph # 0.75 (L) 1.10 - 3.70 k/uL    Absolute Mono # 0.92 0.10 - 1.20 k/uL    Absolute Eos # 0.03 0.00 - 0.44 k/uL    Basophils Absolute 0.04 0.00 - 0.20 k/uL    Absolute Immature Granulocyte 0.09 0.00 - 0.30 k/uL    WBC Morphology NOT REPORTED     RBC Morphology ANISOCYTOSIS PRESENT     Platelet Estimate NOT REPORTED    Comprehensive Metabolic Panel    Collection Time: 09/09/21  7:35 PM   Result Value Ref Range    Glucose 113 (H) 70 - 99 mg/dL    BUN 14 8 - 23 mg/dL    CREATININE 0.94 (H) 0.50 - 0.90 mg/dL    Bun/Cre Ratio NOT REPORTED 9 - 20    Calcium 9.4 8.6 - 10.4 mg/dL    Sodium 134 (L) 135 - 144 mmol/L    Potassium 4.0 3.7 - 5.3 mmol/L    Chloride 99 98 - 107 mmol/L    CO2 20 20 - 31 mmol/L    Anion Gap 15 9 - 17 mmol/L    Alkaline Phosphatase 151 (H) 35 - 104 U/L    ALT 25 5 - 33 U/L    AST 25 <32 U/L    Total Bilirubin 0.60 0.3 - 1.2 mg/dL    Total Protein 7.0 6.4 - 8.3 g/dL    Albumin 3.9 3.5 - 5.2 g/dL    Albumin/Globulin Ratio 1.3 1.0 - 2.5    GFR Non- 58 (L) >60 mL/min    GFR African American >60 >60 mL/min    GFR Comment          GFR Staging NOT REPORTED    Procalcitonin    Collection Time: 09/09/21  7:35 PM   Result Value Ref Range    Procalcitonin 0.13 (H) <0.09 ng/mL   Troponin    Collection Time: 09/09/21  7:35 PM   Result Value Ref Range    Troponin, High Sensitivity 28 (H) 0 - 14 ng/L    Troponin T NOT REPORTED <0.03 ng/mL Troponin Interp NOT REPORTED    Lactate, Sepsis    Collection Time: 09/09/21  7:36 PM   Result Value Ref Range    Lactic Acid, Sepsis NOT REPORTED 0.5 - 1.9 mmol/L    Lactic Acid, Sepsis, Whole Blood 1.9 0.5 - 1.9 mmol/L   Culture, Blood 1    Collection Time: 09/09/21  7:37 PM    Specimen: Blood   Result Value Ref Range    Specimen Description . BLOOD     Special Requests  L AC 3 ML     Culture NO GROWTH 3 HOURS    COVID-19, Rapid    Collection Time: 09/09/21  8:32 PM    Specimen: Nasopharyngeal Swab   Result Value Ref Range    Specimen Description . NASOPHARYNGEAL SWAB     SARS-CoV-2, Rapid Not Detected Not Detected   Urinalysis with Microscopic    Collection Time: 09/09/21  9:30 PM   Result Value Ref Range    Color, UA YELLOW YELLOW    Turbidity UA CLEAR CLEAR    Glucose, Ur NEGATIVE NEGATIVE    Bilirubin Urine NEGATIVE NEGATIVE    Ketones, Urine NEGATIVE NEGATIVE    Specific Gravity, UA 1.016 1.005 - 1.030    Urine Hgb SMALL (A) NEGATIVE    pH, UA 7.5 5.0 - 8.0    Protein, UA TRACE (A) NEGATIVE    Urobilinogen, Urine Normal Normal    Nitrite, Urine NEGATIVE NEGATIVE    Leukocyte Esterase, Urine MODERATE (A) NEGATIVE    -          WBC, UA TOO NUMEROUS TO COUNT 0 - 5 /HPF    RBC, UA 5 TO 10 0 - 4 /HPF    Casts UA  0 - 8 /LPF     2 TO 5 HYALINE Reference range defined for non-centrifuged specimen. Crystals, UA NOT REPORTED None /HPF    Epithelial Cells UA 0 TO 2 0 - 5 /HPF    Renal Epithelial, UA NOT REPORTED 0 /HPF    Bacteria, UA MANY (A) None    Mucus, UA NOT REPORTED None    Trichomonas, UA NOT REPORTED None    Amorphous, UA NOT REPORTED None    Other Observations UA NOT REPORTED NOT REQ.     Yeast, UA NOT REPORTED None   Lactate, Sepsis    Collection Time: 09/09/21  9:56 PM   Result Value Ref Range    Lactic Acid, Sepsis NOT REPORTED 0.5 - 1.9 mmol/L    Lactic Acid, Sepsis, Whole Blood 2.2 (H) 0.5 - 1.9 mmol/L   Blood Gas, Venous    Collection Time: 09/09/21  9:56 PM   Result Value Ref Range    pH, Nimesh 7.550 (H) 7.320 - 7.420    pCO2, Nimesh 24.2 (L) 39 - 55    pO2, Nimesh 203.0 (H) 30 - 50    HCO3, Venous 21.1 (L) 24 - 30 mmol/L    Positive Base Excess, Nimesh 0.4 0.0 - 2.0 mmol/L    Negative Base Excess, Nimesh NOT REPORTED 0.0 - 2.0 mmol/L    O2 Sat, Nimesh 99.5 (H) 60.0 - 85.0 %    Total Hb NOT REPORTED 12.0 - 16.0 g/dl    Oxyhemoglobin NOT REPORTED 95.0 - 98.0 %    Carboxyhemoglobin 1.9 0 - 5 %    Methemoglobin NOT REPORTED 0.0 - 1.5 %    Pt Temp 37.0     pH, Nimesh, Temp Adj NOT REPORTED 7.320 - 7.420    pCO2, Nimesh, Temp Adj NOT REPORTED 39 - 55 mmHg    pO2, Nimesh, Temp Adj NOT REPORTED 30 - 50 mmHg    O2 Device/Flow/% NOT REPORTED     Respiratory Rate NOT REPORTED     Jeremi Test NOT REPORTED     Sample Site NOT REPORTED     Pt.  Position NOT REPORTED     Mode NOT REPORTED     Set Rate NOT REPORTED     Total Rate NOT REPORTED     VT NOT REPORTED     FIO2 INFORMATION NOT PROVIDED     Peep/Cpap NOT REPORTED     PSV NOT REPORTED     Text for Respiratory NOT REPORTED     NOTIFICATION NOT REPORTED     NOTIFICATION TIME NOT REPORTED    Troponin    Collection Time: 09/09/21  9:57 PM   Result Value Ref Range    Troponin, High Sensitivity 35 (H) 0 - 14 ng/L    Troponin T NOT REPORTED <0.03 ng/mL    Troponin Interp NOT REPORTED    CBC    Collection Time: 09/10/21  1:21 AM   Result Value Ref Range    WBC 13.2 (H) 3.5 - 11.3 k/uL    RBC 4.63 3.95 - 5.11 m/uL    Hemoglobin 12.9 11.9 - 15.1 g/dL    Hematocrit 41.5 36.3 - 47.1 %    MCV 89.6 82.6 - 102.9 fL    MCH 27.9 25.2 - 33.5 pg    MCHC 31.1 28.4 - 34.8 g/dL    RDW 16.1 (H) 11.8 - 14.4 %    Platelets 234 925 - 794 k/uL    MPV 10.8 8.1 - 13.5 fL    NRBC Automated 0.0 0.0 per 100 WBC   Comprehensive Metabolic Panel w/ Reflex to MG    Collection Time: 09/10/21  1:21 AM   Result Value Ref Range    Glucose 126 (H) 70 - 99 mg/dL    BUN 14 8 - 23 mg/dL    CREATININE 1.04 (H) 0.50 - 0.90 mg/dL    Bun/Cre Ratio NOT REPORTED 9 - 20    Calcium 8.7 8.6 - 10.4 mg/dL    Sodium 133 (L) 135 - 144 mmol/L Potassium 3.7 3.7 - 5.3 mmol/L    Chloride 100 98 - 107 mmol/L    CO2 19 (L) 20 - 31 mmol/L    Anion Gap 14 9 - 17 mmol/L    Alkaline Phosphatase 132 (H) 35 - 104 U/L    ALT 21 5 - 33 U/L    AST 17 <32 U/L    Total Bilirubin 0.52 0.3 - 1.2 mg/dL    Total Protein 6.4 6.4 - 8.3 g/dL    Albumin 3.5 3.5 - 5.2 g/dL    Albumin/Globulin Ratio 1.2 1.0 - 2.5    GFR Non-African American 52 (L) >60 mL/min    GFR African American >60 >60 mL/min    GFR Comment          GFR Staging NOT REPORTED    Ionized Calcium    Collection Time: 09/10/21  1:21 AM   Result Value Ref Range    Calcium, Ion 0.98 (L) 1.13 - 1.33 mmol/L   Magnesium    Collection Time: 09/10/21  1:21 AM   Result Value Ref Range    Magnesium 1.9 1.6 - 2.6 mg/dL   Phosphorus    Collection Time: 09/10/21  1:21 AM   Result Value Ref Range    Phosphorus 3.2 2.6 - 4.5 mg/dL   Protime-INR    Collection Time: 09/10/21  1:21 AM   Result Value Ref Range    Protime 12.1 9.1 - 12.3 sec    INR 1.1    Lactate, Sepsis    Collection Time: 09/10/21  1:21 AM   Result Value Ref Range    Lactic Acid, Sepsis NOT REPORTED 0.5 - 1.9 mmol/L    Lactic Acid, Sepsis, Whole Blood 2.7 (H) 0.5 - 1.9 mmol/L       Imaging/Diagnostics:  CT ABDOMEN PELVIS W IV CONTRAST Additional Contrast? None    Result Date: 9/9/2021  1. Persisting moderate hiatal hernia with distal esophageal moderate distension and fluid-filled lumen, possibly associated with gastroesophageal reflux. 2. Marked diffuse hepatic steatosis. 3. Hepatomegaly. 4. Bilateral lower lung lobe and left lingular mild scattered bronchiectasis with surrounding multifocal mild scattered consolidation. Findings suggest presence of pneumonia in setting of changes related to chronic infection. Recommend clinical correlation.      XR CHEST PORTABLE    Result Date: 9/9/2021  No acute cardiopulmonary disease       Assessment :      Hospital Problems         Last Modified POA    * (Principal) Sepsis ([address] 9/10/2021 Yes    GERD (gastroesophageal reflux disease) 9/10/2021 Yes    Depression 9/10/2021 Yes    Anxiety 9/10/2021 Yes    Hiatal hernia 9/10/2021 Yes    Recurrent UTI 9/10/2021 Yes    Overview Signed 5/12/2020  5:22 PM by MD Dr. Migdalia Spencer Proper         Hyperlipidemia 9/10/2021 Yes    Hypertension 9/10/2021 Yes    S/P Nissen fundoplication (without gastrostomy tube) procedure 9/10/2021 Yes          Plan:     Patient status inpatient in the Med/Surge    1. Sepsis: likely urinary source, follow urine and blood cultures, need sputum cultures and CXR this am, continue IV fluids and broad spectrum antibiotics and deescalate accordingly, will give additional small IV fluid bolus as lactic acid is up trending  2. Elevated serum creatinine: likely 2/1 and poor PO intake over the last several days, continue IV fluids and repeat with am labs, lasix on hold with fluid rescussitation  3. HTN: controlled, lasix on hold  4. Anxiety/Depression: no acute disturbances, resume home dose amitriptyline, buspar, Seroquel and klonopin  5. HLD: on statin  6. GERD: carafate/PPI  7. S/p Nissen fundoplication without PEG tube  8. Follow routine labs, home medications reviewed, advance diet as tolerated  9. DVT/PPI  10. Full Code    Consultations:   IP CONSULT TO INTERNAL MEDICINE  PHARMACY TO DOSE VANCOMYCIN    Patient is admitted as inpatient status because of co-morbidities listed above, severity of signs and symptoms as outlined, requirement for current medical therapies and most importantly because of direct risk to patient if care not provided in a hospital setting. Expected length of stay > 48 hours.     MARGOTH oGmez NP  9/10/2021  5:11 AM    Copy sent to Dr. Paula Chaidez MD

## 2021-09-10 NOTE — PLAN OF CARE
Problem: Skin Integrity:  Goal: Will show no infection signs and symptoms  Description: Will show no infection signs and symptoms  9/10/2021 1631 by Gavin Lee RN  Outcome: Ongoing     Problem: Skin Integrity:  Goal: Absence of new skin breakdown  Description: Absence of new skin breakdown  9/10/2021 1631 by Gavin Lee RN  Outcome: Ongoing     Problem: Falls - Risk of:  Goal: Will remain free from falls  Description: Will remain free from falls  9/10/2021 1631 by Gavin Lee RN  Outcome: Ongoing     Problem: Falls - Risk of:  Goal: Absence of physical injury  Description: Absence of physical injury  9/10/2021 1631 by Gavin Lee RN  Outcome: Ongoing     Problem: Pain:  Goal: Pain level will decrease  Description: Pain level will decrease  9/10/2021 1631 by Gavin Lee RN  Outcome: Ongoing     Problem: Pain:  Goal: Control of chronic pain  Description: Control of chronic pain  9/10/2021 1631 by Gavin Lee RN  Outcome: Ongoing

## 2021-09-10 NOTE — CARE COORDINATION
Case Management Initial Discharge Plan  [facility] [place],             Met with:patient to discuss discharge plans. Information verified: address, contacts, phone number, , insurance Yes  Insurance Provider: Medicare/[facility]    Emergency Contact/Next of Kin name & number: Jamil Vanessa2 [phone]  Who are involved in patient's support system? Lives at SNF    PCP: at [facility]  Date of last visit: few days ago      Discharge Planning    Living Arrangements:  Alone     Lives at [facility]    Patient able to perform ADL's:Assisted    Current Services (outpatient & in home)   DME equipment: wheelchair  DME provider:     Is patient receiving oral anticoagulation therapy? No    If indicated:   Physician managing anticoagulation treatment:   Where does patient obtain lab work for ATC treatment? Potential Assistance Needed:  Bob Membreno Durable Medical Equipment    Patient agreeable to home care:   [facility] of choice provided:  n/a    Prior SNF/Rehab Placement and Facility: yes  Agreeable to SNF/Rehab: Yes  [facility] of choice provided: n/a wants to return to [address] [place] [address] Evaluation: n/a    Expected Discharge date:  21    Patient expects to be discharged to: If home: is the family and/or caregiver wiling & able to provide support at home? Who will be providing this support? Follow Up Appointment: Best Day/ Time: Monday PM    Transportation provider: [address] [place] [address] arrangements needed for discharge: Yes    Readmission Risk              Risk of Unplanned Readmission:  24         Does patient have a readmission risk score greater than 14?: Yes  If yes, follow-up appointment must be made within 7 days of discharge.      Goals of Care: safety      Educated patient on transitional options, provided freedom of choice and are agreeable with plan      Discharge Plan: Return to [facility]          Electronically signed by Rashi Nascimento RN on 9/10/21 at 1:32 PM EDT

## 2021-09-10 NOTE — ED NOTES
RN spoke with patient's family and updated on plan of care      Vivienne Ferris, [address] [facility]  09/09/21 6422

## 2021-09-11 LAB
ANION GAP SERPL CALCULATED.3IONS-SCNC: 10 MMOL/L (ref 9–17)
BUN BLDV-MCNC: 11 MG/DL (ref 8–23)
BUN/CREAT BLD: ABNORMAL (ref 9–20)
CALCIUM SERPL-MCNC: 8 MG/DL (ref 8.6–10.4)
CHLORIDE BLD-SCNC: 110 MMOL/L (ref 98–107)
CO2: 18 MMOL/L (ref 20–31)
CREAT SERPL-MCNC: 0.84 MG/DL (ref 0.5–0.9)
EKG ATRIAL RATE: 71 BPM
EKG ATRIAL RATE: 77 BPM
EKG P AXIS: 36 DEGREES
EKG P AXIS: 48 DEGREES
EKG P-R INTERVAL: 162 MS
EKG P-R INTERVAL: 182 MS
EKG Q-T INTERVAL: 332 MS
EKG Q-T INTERVAL: 422 MS
EKG QRS DURATION: 76 MS
EKG QRS DURATION: 82 MS
EKG QTC CALCULATION (BAZETT): 360 MS
EKG QTC CALCULATION (BAZETT): 477 MS
EKG R AXIS: 22 DEGREES
EKG R AXIS: 9 DEGREES
EKG T AXIS: 25 DEGREES
EKG T AXIS: 44 DEGREES
EKG VENTRICULAR RATE: 71 BPM
EKG VENTRICULAR RATE: 77 BPM
GFR AFRICAN AMERICAN: >60 ML/MIN
GFR NON-AFRICAN AMERICAN: >60 ML/MIN
GFR SERPL CREATININE-BSD FRML MDRD: ABNORMAL ML/MIN/{1.73_M2}
GFR SERPL CREATININE-BSD FRML MDRD: ABNORMAL ML/MIN/{1.73_M2}
GLUCOSE BLD-MCNC: 96 MG/DL (ref 70–99)
POTASSIUM SERPL-SCNC: 3.9 MMOL/L (ref 3.7–5.3)
SODIUM BLD-SCNC: 138 MMOL/L (ref 135–144)
VANCOMYCIN TROUGH DATE LAST DOSE: NORMAL
VANCOMYCIN TROUGH DOSE AMOUNT: NORMAL
VANCOMYCIN TROUGH TIME LAST DOSE: NORMAL
VANCOMYCIN TROUGH: 15.6 UG/ML (ref 10–20)

## 2021-09-11 PROCEDURE — 6370000000 HC RX 637 (ALT 250 FOR IP): Performed by: NURSE PRACTITIONER

## 2021-09-11 PROCEDURE — 2580000003 HC RX 258: Performed by: NURSE PRACTITIONER

## 2021-09-11 PROCEDURE — 80202 ASSAY OF VANCOMYCIN: CPT

## 2021-09-11 PROCEDURE — 2580000003 HC RX 258: Performed by: INTERNAL MEDICINE

## 2021-09-11 PROCEDURE — 6360000002 HC RX W HCPCS: Performed by: INTERNAL MEDICINE

## 2021-09-11 PROCEDURE — 6370000000 HC RX 637 (ALT 250 FOR IP): Performed by: FAMILY MEDICINE

## 2021-09-11 PROCEDURE — 99232 SBSQ HOSP IP/OBS MODERATE 35: CPT | Performed by: FAMILY MEDICINE

## 2021-09-11 PROCEDURE — 1200000000 HC SEMI PRIVATE

## 2021-09-11 PROCEDURE — 6360000002 HC RX W HCPCS: Performed by: NURSE PRACTITIONER

## 2021-09-11 PROCEDURE — 94640 AIRWAY INHALATION TREATMENT: CPT

## 2021-09-11 PROCEDURE — 36415 COLL VENOUS BLD VENIPUNCTURE: CPT

## 2021-09-11 PROCEDURE — 80048 BASIC METABOLIC PNL TOTAL CA: CPT

## 2021-09-11 RX ORDER — DOCUSATE SODIUM 100 MG/1
100 CAPSULE, LIQUID FILLED ORAL 2 TIMES DAILY
Status: DISCONTINUED | OUTPATIENT
Start: 2021-09-11 | End: 2021-09-15 | Stop reason: HOSPADM

## 2021-09-11 RX ORDER — HYDROCODONE BITARTRATE AND ACETAMINOPHEN 5; 325 MG/1; MG/1
1 TABLET ORAL EVERY 6 HOURS PRN
Status: DISCONTINUED | OUTPATIENT
Start: 2021-09-11 | End: 2021-09-15 | Stop reason: HOSPADM

## 2021-09-11 RX ORDER — HYDROCODONE BITARTRATE AND ACETAMINOPHEN 5; 325 MG/1; MG/1
2 TABLET ORAL EVERY 6 HOURS PRN
Status: DISCONTINUED | OUTPATIENT
Start: 2021-09-11 | End: 2021-09-15 | Stop reason: HOSPADM

## 2021-09-11 RX ADMIN — BUSPIRONE HYDROCHLORIDE 20 MG: 10 TABLET ORAL at 21:23

## 2021-09-11 RX ADMIN — PANTOPRAZOLE SODIUM 40 MG: 40 TABLET, DELAYED RELEASE ORAL at 08:59

## 2021-09-11 RX ADMIN — SODIUM CHLORIDE: 9 INJECTION, SOLUTION INTRAVENOUS at 00:59

## 2021-09-11 RX ADMIN — DOCUSATE SODIUM 100 MG: 100 CAPSULE ORAL at 01:04

## 2021-09-11 RX ADMIN — METOCLOPRAMIDE 5 MG: 10 TABLET ORAL at 06:18

## 2021-09-11 RX ADMIN — SUCRALFATE 1 G: 1 TABLET ORAL at 08:55

## 2021-09-11 RX ADMIN — VANCOMYCIN HYDROCHLORIDE 750 MG: 10 INJECTION, POWDER, LYOPHILIZED, FOR SOLUTION INTRAVENOUS at 13:00

## 2021-09-11 RX ADMIN — VANCOMYCIN HYDROCHLORIDE 750 MG: 10 INJECTION, POWDER, LYOPHILIZED, FOR SOLUTION INTRAVENOUS at 01:00

## 2021-09-11 RX ADMIN — AMITRIPTYLINE HYDROCHLORIDE 25 MG: 25 TABLET, FILM COATED ORAL at 21:23

## 2021-09-11 RX ADMIN — GABAPENTIN 300 MG: 300 CAPSULE ORAL at 21:23

## 2021-09-11 RX ADMIN — PANTOPRAZOLE SODIUM 40 MG: 40 TABLET, DELAYED RELEASE ORAL at 21:23

## 2021-09-11 RX ADMIN — SODIUM CHLORIDE, PRESERVATIVE FREE 10 ML: 5 INJECTION INTRAVENOUS at 21:24

## 2021-09-11 RX ADMIN — HYDROCODONE BITARTRATE AND ACETAMINOPHEN 2 TABLET: 5; 325 TABLET ORAL at 21:35

## 2021-09-11 RX ADMIN — BUDESONIDE AND FORMOTEROL FUMARATE DIHYDRATE 2 PUFF: 160; 4.5 AEROSOL RESPIRATORY (INHALATION) at 21:50

## 2021-09-11 RX ADMIN — CLONAZEPAM 0.25 MG: 0.5 TABLET ORAL at 08:55

## 2021-09-11 RX ADMIN — CLONAZEPAM 0.25 MG: 0.5 TABLET ORAL at 21:23

## 2021-09-11 RX ADMIN — TRAZODONE HYDROCHLORIDE 100 MG: 100 TABLET ORAL at 21:23

## 2021-09-11 RX ADMIN — BUDESONIDE AND FORMOTEROL FUMARATE DIHYDRATE 2 PUFF: 160; 4.5 AEROSOL RESPIRATORY (INHALATION) at 07:26

## 2021-09-11 RX ADMIN — QUETIAPINE FUMARATE 25 MG: 25 TABLET ORAL at 08:55

## 2021-09-11 RX ADMIN — ENOXAPARIN SODIUM 40 MG: 40 INJECTION SUBCUTANEOUS at 08:55

## 2021-09-11 RX ADMIN — METOCLOPRAMIDE 5 MG: 10 TABLET ORAL at 13:00

## 2021-09-11 RX ADMIN — QUETIAPINE FUMARATE 25 MG: 25 TABLET ORAL at 21:23

## 2021-09-11 RX ADMIN — GABAPENTIN 300 MG: 300 CAPSULE ORAL at 08:55

## 2021-09-11 RX ADMIN — CEFEPIME HYDROCHLORIDE 2000 MG: 2 INJECTION, POWDER, FOR SOLUTION INTRAVENOUS at 21:24

## 2021-09-11 RX ADMIN — DOCUSATE SODIUM 100 MG: 100 CAPSULE ORAL at 21:23

## 2021-09-11 RX ADMIN — SUCRALFATE 1 G: 1 TABLET ORAL at 13:00

## 2021-09-11 RX ADMIN — CEFEPIME HYDROCHLORIDE 2000 MG: 2 INJECTION, POWDER, FOR SOLUTION INTRAVENOUS at 08:55

## 2021-09-11 RX ADMIN — BUSPIRONE HYDROCHLORIDE 20 MG: 10 TABLET ORAL at 13:00

## 2021-09-11 RX ADMIN — FERROUS SULFATE TAB EC 325 MG (65 MG FE EQUIVALENT) 325 MG: 325 (65 FE) TABLET DELAYED RESPONSE at 08:55

## 2021-09-11 RX ADMIN — METOPROLOL SUCCINATE 25 MG: 25 TABLET, FILM COATED, EXTENDED RELEASE ORAL at 08:55

## 2021-09-11 RX ADMIN — BUSPIRONE HYDROCHLORIDE 20 MG: 10 TABLET ORAL at 08:55

## 2021-09-11 RX ADMIN — HYDROCODONE BITARTRATE AND ACETAMINOPHEN 2 TABLET: 5; 325 TABLET ORAL at 12:59

## 2021-09-11 RX ADMIN — HYDROCODONE BITARTRATE AND ACETAMINOPHEN 2 TABLET: 5; 325 TABLET ORAL at 00:59

## 2021-09-11 RX ADMIN — DOCUSATE SODIUM 100 MG: 100 CAPSULE ORAL at 08:55

## 2021-09-11 RX ADMIN — SUCRALFATE 1 G: 1 TABLET ORAL at 17:44

## 2021-09-11 RX ADMIN — FUROSEMIDE 20 MG: 20 TABLET ORAL at 08:55

## 2021-09-11 RX ADMIN — METOCLOPRAMIDE 5 MG: 10 TABLET ORAL at 17:44

## 2021-09-11 RX ADMIN — ATORVASTATIN CALCIUM 20 MG: 20 TABLET, FILM COATED ORAL at 08:55

## 2021-09-11 RX ADMIN — HYDROCODONE BITARTRATE AND ACETAMINOPHEN 1 TABLET: 5; 325 TABLET ORAL at 06:59

## 2021-09-11 RX ADMIN — SUCRALFATE 1 G: 1 TABLET ORAL at 21:23

## 2021-09-11 RX ADMIN — GABAPENTIN 300 MG: 300 CAPSULE ORAL at 13:00

## 2021-09-11 ASSESSMENT — PAIN SCALES - GENERAL
PAINLEVEL_OUTOF10: 5
PAINLEVEL_OUTOF10: 7
PAINLEVEL_OUTOF10: 2
PAINLEVEL_OUTOF10: 8
PAINLEVEL_OUTOF10: 2
PAINLEVEL_OUTOF10: 3
PAINLEVEL_OUTOF10: 5
PAINLEVEL_OUTOF10: 7

## 2021-09-11 ASSESSMENT — PAIN DESCRIPTION - PAIN TYPE: TYPE: CHRONIC PAIN

## 2021-09-11 ASSESSMENT — ENCOUNTER SYMPTOMS: TACHYPNEA: 1

## 2021-09-11 ASSESSMENT — PAIN DESCRIPTION - LOCATION: LOCATION: BACK;NECK

## 2021-09-11 NOTE — PROGRESS NOTES
[facility]  Office: [address], DO, Edwin Danielle, DO, Daly Cruz, DO, Karol Kaya Blood, DO, Von Guevara MD, Reji Chadwick MD, Danny Armstrong MD, Shahid Ferraro MD, Angélica Shore MD, Rosa M Billy MD, Kirsty Morgan MD, Ludivina Fragmin, DO, Anisa Shaikh, DO, Nazia Kaur MD,  Edilma Lopez, DO, Tima Casarez MD, Mamta Royal MD, Anna Guzman MD, Gabriela Shine MD, , Carla Yanez MD, Dhara Marr MD, Sandra Keene MD, Daniel Campos, CNP, [facility], CNP, Luisito Tim, CNP, Esther Mask, CNS, Cooper Climax, CNP, Preeti Buck, CNP, Danae Mike, CNP, Carlos Shah, CNP, Rolando Holland, CNP, Lendel Nissen, PA-C, Chito Jensen, [facility], Erin Andrews, CNP, Jill Rangel, CNP, Jess Simpson, CNP, Rolando Kelly, CNP, Ligia Garay, CNP, Gay Chow, CNP, Alondra Alfonso, CNP, [facility], [address] [place] [address]    Progress Note    9/11/2021    7:48 AM    Name:   Jennifer Langford  MRN:     [id number]     Acct:      [de-identified]   Room:   [address] [place] [address] Day:  2  Admit Date:  9/9/2021  7:08 PM    PCP:   Reza Louie MD  Code Status:  Full Code    Subjective:     C/C:   Chief Complaint   Patient presents with    Fever    Fatigue    Cough      Interval History Status: not changed. Pt was seen and examined this morning  No acute events overnight   No complaints at this time   States that she feels much better        Review of Systems:     12 point ROS performed and negative for anything other than what was stated in subjective     Medications: Allergies:     Allergies   Allergen Reactions    Prednisone Anxiety    Compazine [Prochlorperazine Maleate]     Penicillin V Potassium     Penicillins Other (See Comments)     Shock- received meropenem and ceftriaxone wo issues 2020       Current Meds:   Scheduled Meds:    docusate sodium  100 mg Oral BID    budesonide-formoterol  2 puff Inhalation BID    cefepime  2,000 mg IntraVENous Q12H    amitriptyline  25 mg Oral Nightly    busPIRone  20 mg Oral TID    clonazePAM  0.25 mg Oral BID    ferrous sulfate  325 mg Oral Daily with breakfast    furosemide  20 mg Oral Daily    gabapentin  300 mg Oral TID    metoclopramide  5 mg Oral TID AC    metoprolol succinate  25 mg Oral Daily    pantoprazole  40 mg Oral BID    polyethylene glycol  17 g Oral Every Other Day    QUEtiapine  25 mg Oral BID    atorvastatin  20 mg Oral Daily    sucralfate  1 g Oral 4x Daily    traZODone  100 mg Oral Nightly    sodium chloride flush  5-40 mL IntraVENous 2 times per day    enoxaparin  40 mg SubCUTAneous Daily    vancomycin  750 mg IntraVENous Q12H    vancomycin (VANCOCIN) intermittent dosing (placeholder)   Other RX Placeholder     Continuous Infusions:    sodium chloride 100 mL/hr at 09/11/21 0059    sodium chloride       PRN Meds: HYDROcodone 5 mg - acetaminophen **OR** HYDROcodone 5 mg - acetaminophen, bisacodyl, calcium carbonate, sodium chloride flush, sodium chloride, acetaminophen **OR** acetaminophen    Data:     Past Medical History:   has a past medical history of Anxiety, Arthritis, Back pain, Bronchitis, Caffeine use, Carpal tunnel syndrome, Cataracts, bilateral, Constipation, Depression, Diarrhea, GERD (gastroesophageal reflux disease), H/O gastric ulcer, Hyperlipidemia, Hypertension, Mumps, MVP (mitral valve prolapse), Recurrent UTI, Sinusitis, Tracheostomy in place [facility]), Ulcerative esophagitis, and Wellness examination. Social History:   reports that she has quit smoking. Her smoking use included cigarettes. She has a 50.00 pack-year smoking history. She has never used smokeless tobacco. She reports that she does not drink alcohol and does not use drugs.      Family History:   Family History   Problem Relation Age of Onset    Cancer Mother         uterine    Heart Attack Mother     Heart Attack Father     Other Maternal Grandfather         foot gangrene    Other Paternal Grandmother         bleeding ulcers    Other Paternal Grandfather         lung cancer       Vitals:  /63   Pulse 78   Temp 97.9 °F (36.6 °C) (Oral)   Resp 20   Ht 5' 2\" (1.575 m)   Wt 176 lb 12.8 oz (80.2 kg)   SpO2 99%   BMI 32.34 kg/m²   Temp (24hrs), Av.9 °F (36.6 °C), Min:97.5 °F (36.4 °C), Max:98 °F (36.7 °C)    No results for input(s): POCGLU in the last 72 hours. I/O (24Hr): Intake/Output Summary (Last 24 hours) at 2021 0748  Last data filed at 9/10/2021 1721  Gross per 24 hour   Intake --   Output 725 ml   Net -725 ml       Labs:  Hematology:  Recent Labs     09/09/21  1935 09/10/21  012   WBC 12.6* 13.2*   RBC 4.89 4.63   HGB 13.7 12.9   HCT 41.6 41.5   MCV 85.1 89.6   MCH 28.0 27.9   MCHC 32.9 31.1   RDW 15.9* 16.1*    166   MPV 11.0 10.8   INR  --  1.1     Chemistry:  Recent Labs     21  2157 09/10/21  0121 09/10/21  0524 21  0502   *   < >  --  133* 136 138   K 4.0   < >  --  3.7 3.7 3.9   CL 99   < >  --  100 104 110*   CO2 20   < >  --  19* 18* 18*   GLUCOSE 113*   < >  --  126* 111* 96   BUN 14   < >  --  14 13 11   CREATININE 0.94*   < >  --  1.04* 0.94* 0.84   MG  --   --   --  1.9  --   --    ANIONGAP 15   < >  --  14 14 10   LABGLOM 58*   < >  --  52* 58* >60   GFRAA >60   < >  --  >60 >60 >60   CALCIUM 9.4   < >  --  8.7 8.5* 8.0*   CAION  --   --   --  0.98*  --   --    PHOS  --   --   --  3.2  --   --    TROPHS 28*  --  35*  --   --   --    LACTACIDWB  --   --   --   --  1.6  --     < > = values in this interval not displayed.      Recent Labs     21  1935 09/10/21  0121   PROT 7.0 6.4   LABALBU 3.9 3.5   AST 25 17   ALT 25 21   ALKPHOS 151* 132*   BILITOT 0.60 0.52     ABG:  Lab Results   Component Value Date    POCPH 7.437 2020    POCPCO2 42.3 2020    POCPO2 129.9 2020    POCHCO3 28.6 2020    NBEA NOT REPORTED 2020    PBEA 4 2020    RJG3FFO 30 05/30/2020    LRGJ4QFW 99 05/30/2020    FIO2 INFORMATION NOT PROVIDED 09/09/2021     Lab Results   Component Value Date/Time    SPECIAL NOT REPORTED 09/09/2021 09:30 PM     Lab Results   Component Value Date/Time    CULTURE (A) 09/09/2021 09:30 PM     LACTOSE FERMENTING GRAM NEGATIVE RODS >[id number] CFU/ML       Radiology:  CT ABDOMEN PELVIS W IV CONTRAST Additional Contrast? None    Result Date: 9/9/2021  1. Persisting moderate hiatal hernia with distal esophageal moderate distension and fluid-filled lumen, possibly associated with gastroesophageal reflux. 2. Marked diffuse hepatic steatosis. 3. Hepatomegaly. 4. Bilateral lower lung lobe and left lingular mild scattered bronchiectasis with surrounding multifocal mild scattered consolidation. Findings suggest presence of pneumonia in setting of changes related to chronic infection. Recommend clinical correlation. XR CHEST PORTABLE    Result Date: 9/10/2021  Chronic pulmonary change without acute cardiopulmonary process.      XR CHEST PORTABLE    Result Date: 9/9/2021  No acute cardiopulmonary disease       Physical Examination:        General appearance:  alert, cooperative and no distress  Mental Status:  oriented to person, place and time and normal affect  Lungs:  clear to auscultation bilaterally, normal effort  Heart:  regular rate and rhythm, no murmur  Abdomen:  soft, nontender, nondistended, normal bowel sounds  Extremities:  no edema, redness, tenderness in the calves  Skin:  no gross lesions, rashes, induration    Assessment:        Hospital Problems         Last Modified POA    * (Principal) Sepsis ([address] 9/10/2021 Yes    GERD (gastroesophageal reflux disease) 9/10/2021 Yes    Depression 9/10/2021 Yes    Anxiety 9/10/2021 Yes    Hiatal hernia 9/10/2021 Yes    Recurrent UTI 9/10/2021 Yes    Overview Signed 5/12/2020  5:22 PM by MD Dr. Chris Munoz         Hyperlipidemia 9/10/2021 Yes    Hypertension 9/10/2021 Yes    S/P Nissen fundoplication (without gastrostomy tube) procedure 9/10/2021 Yes          Plan:        1. Sepsis  2. UTI   - urine culture positive for gram negative rods   - blood cx ngtd   - currently afebrile   - continue IV cefepime   - v/s per unit protocol     3. Elevated serum creatinine:   - likely 2/1 and poor PO intake over the last several days  - resolved at this time     4. HTN:   - controlled  - v/s per unit protocol   - continue home anti hypertensive regimen     5. Anxiety/Depression  - no acute disturbances  - resume home dose amitriptyline, buspar, Seroquel and klonopin    6. HLD  - continue on statin    7. GERD  - continue on carafate/PPI    8.  S/p Nissen fundoplication without PEG tube    Leticia Jimenez MD  9/11/2021  7:48 AM

## 2021-09-11 NOTE — PROGRESS NOTES
Pharmacy Vancomycin Consult     Vancomycin Day: 3  Current Dosin mg IVPB q12h  Current indication: UTI    Temp max:  36.8    Recent Labs     21  19321  1935 09/10/21  0121 09/10/21  0121 09/10/21  0524 21  0502   BUN 14   < > 14   < > 13 11   CREATININE 0.94*   < > 1.04*   < > 0.94* 0.84   WBC 12.6*  --  13.2*  --   --   --     < > = values in this interval not displayed. Intake/Output Summary (Last 24 hours) at 2021 1202  Last data filed at 9/10/2021 1721  Gross per 24 hour   Intake --   Output 475 ml   Net -475 ml     Culture Date      Source                       Results                         Urine                          Gram - (wait for final report)                        Blood                         NG x 2days    Ht Readings from Last 1 Encounters:   21 5' 2\" (1.575 m)        Wt Readings from Last 1 Encounters:   21 176 lb 12.8 oz (80.2 kg)       Body mass index is 32.34 kg/m². Estimated Creatinine Clearance: 57 mL/min (based on SCr of 0.84 mg/dL). Trough: 15.6 mcg/mL    Assessment/Plan:  Using PK calc, actual trough would be 13.9 mcg/mL with AUC > 400, will continue regimen as is and await final cultures. Carlene Bar, Pharm. D.

## 2021-09-11 NOTE — PLAN OF CARE
Problem: Skin Integrity:  Goal: Will show no infection signs and symptoms  Description: Will show no infection signs and symptoms  Outcome: Ongoing     Problem: Falls - Risk of:  Goal: Will remain free from falls  Description: Will remain free from falls  Outcome: Ongoing     Problem: Pain:  Goal: Control of acute pain  Description: Control of acute pain  Outcome: Ongoing     Problem: Urinary Elimination:  Goal: Ability to achieve a balanced intake and output will improve  Description: Ability to achieve a balanced intake and output will improve  Outcome: Ongoing

## 2021-09-11 NOTE — PLAN OF CARE
Problem: Skin Integrity:  Goal: Will show no infection signs and symptoms  Description: Will show no infection signs and symptoms  9/11/2021 0140 by Charlie Montes RN  Outcome: Ongoing  9/10/2021 1808 by Karmen Mckeon RN  Outcome: Ongoing  9/10/2021 1631 by Karmen Mckeon RN  Outcome: Ongoing  Goal: Absence of new skin breakdown  Description: Absence of new skin breakdown  9/11/2021 0140 by Charlie Montes RN  Outcome: Ongoing  9/10/2021 1808 by Karmen Mckeon RN  Outcome: Ongoing  9/10/2021 1631 by Karmen Mckeon RN  Outcome: Ongoing     Problem: Falls - Risk of:  Goal: Will remain free from falls  Description: Will remain free from falls  9/11/2021 0140 by Charlie Montes RN  Outcome: Ongoing  9/10/2021 1808 by Karmen Mckeon RN  Outcome: Ongoing  9/10/2021 1631 by Karmen Mckeon RN  Outcome: Ongoing  Goal: Absence of physical injury  Description: Absence of physical injury  9/11/2021 0140 by Charlie Monets RN  Outcome: Ongoing  9/10/2021 1808 by Karmen Mckeon RN  Outcome: Ongoing  9/10/2021 1631 by Karmen Mckeon RN  Outcome: Ongoing     Problem: Pain:  Goal: Pain level will decrease  Description: Pain level will decrease  9/11/2021 0140 by Charlie Montes RN  Outcome: Ongoing  9/10/2021 1808 by Karmen Mckeon RN  Outcome: Ongoing  9/10/2021 1631 by Karmen Mckeon RN  Outcome: Ongoing  Goal: Control of acute pain  Description: Control of acute pain  9/11/2021 0140 by Charlie Montes RN  Outcome: Ongoing  9/10/2021 1808 by Karmen Mckeon RN  Outcome: Ongoing  9/10/2021 1631 by Karmen Mckeon RN  Outcome: Ongoing  Goal: Control of chronic pain  Description: Control of chronic pain  9/11/2021 0140 by Charlie Montes RN  Outcome: Ongoing  9/10/2021 1808 by Karmen Mckeon RN  Outcome: Ongoing  9/10/2021 1631 by Karmen Mckeon RN  Outcome: Ongoing     Problem: Coping:  Goal: Expressions of feelings of enhanced comfort will increase  Description: Expressions of feelings of enhanced comfort will increase  9/11/2021 0140 by Kalyan Marcial RN  Outcome: Ongoing  9/10/2021 1808 by Jude Cano RN  Outcome: Ongoing  9/10/2021 1631 by Jude Cano RN  Outcome: Ongoing     Problem: Urinary Elimination:  Goal: Ability to achieve a balanced intake and output will improve  Description: Ability to achieve a balanced intake and output will improve  9/11/2021 0140 by Kalyan Marcial RN  Outcome: Ongoing  9/10/2021 1808 by Jude Cano RN  Outcome: Ongoing  9/10/2021 1631 by Jude Cano RN  Outcome: Ongoing  Goal: Ability to recognize the need to void and respond appropriately will improve  Description: Ability to recognize the need to void and respond appropriately will improve  9/11/2021 0140 by Kalyan Marcial RN  Outcome: Ongoing  9/10/2021 1808 by Jude Cano RN  Outcome: Ongoing  9/10/2021 1631 by Jude Cano RN  Outcome: Ongoing     Problem: Discharge Planning:  Goal: Discharged to appropriate level of care  Description: Discharged to appropriate level of care  9/11/2021 0140 by Kalyan Marcial RN  Outcome: Ongoing  9/10/2021 1808 by Jude Cano RN  Outcome: Ongoing  9/10/2021 1631 by Jude Cano RN  Outcome: Ongoing     Problem: Gas Exchange - Impaired:  Goal: Levels of oxygenation will improve  Description: Levels of oxygenation will improve  9/11/2021 0140 by Kalyan Marcial RN  Outcome: Ongoing  9/10/2021 1808 by Jude Cano RN  Outcome: Ongoing  9/10/2021 1631 by Jude Cano RN  Outcome: Ongoing     Problem: Infection, Septic Shock:  Goal: Will show no infection signs and symptoms  Description: Will show no infection signs and symptoms  9/11/2021 0140 by Kalyan Marcial RN  Outcome: Ongoing  9/10/2021 1808 by Jude Cano RN  Outcome: Ongoing  9/10/2021 1631 by Jude Cano RN  Outcome: Ongoing     Problem: Tissue Perfusion, Altered:  Goal: Circulatory function within specified parameters  Description: Circulatory function within specified parameters  9/11/2021 0140 by Case Colunga RN  Outcome: Ongoing  9/10/2021 1808 by Cipriano Bsas RN  Outcome: Ongoing  9/10/2021 1631 by Cipriano Bass RN  Outcome: Ongoing

## 2021-09-12 LAB
ANION GAP SERPL CALCULATED.3IONS-SCNC: 11 MMOL/L (ref 9–17)
BUN BLDV-MCNC: 9 MG/DL (ref 8–23)
BUN/CREAT BLD: NORMAL (ref 9–20)
CALCIUM SERPL-MCNC: 8.6 MG/DL (ref 8.6–10.4)
CHLORIDE BLD-SCNC: 107 MMOL/L (ref 98–107)
CO2: 21 MMOL/L (ref 20–31)
CREAT SERPL-MCNC: 0.83 MG/DL (ref 0.5–0.9)
CULTURE: ABNORMAL
GFR AFRICAN AMERICAN: >60 ML/MIN
GFR NON-AFRICAN AMERICAN: >60 ML/MIN
GFR SERPL CREATININE-BSD FRML MDRD: NORMAL ML/MIN/{1.73_M2}
GFR SERPL CREATININE-BSD FRML MDRD: NORMAL ML/MIN/{1.73_M2}
GLUCOSE BLD-MCNC: 91 MG/DL (ref 70–99)
Lab: ABNORMAL
POTASSIUM SERPL-SCNC: 3.8 MMOL/L (ref 3.7–5.3)
SODIUM BLD-SCNC: 139 MMOL/L (ref 135–144)
SPECIMEN DESCRIPTION: ABNORMAL

## 2021-09-12 PROCEDURE — 6370000000 HC RX 637 (ALT 250 FOR IP): Performed by: FAMILY MEDICINE

## 2021-09-12 PROCEDURE — 80048 BASIC METABOLIC PNL TOTAL CA: CPT

## 2021-09-12 PROCEDURE — 6370000000 HC RX 637 (ALT 250 FOR IP): Performed by: NURSE PRACTITIONER

## 2021-09-12 PROCEDURE — 2580000003 HC RX 258: Performed by: INTERNAL MEDICINE

## 2021-09-12 PROCEDURE — 94760 N-INVAS EAR/PLS OXIMETRY 1: CPT

## 2021-09-12 PROCEDURE — 6360000002 HC RX W HCPCS: Performed by: INTERNAL MEDICINE

## 2021-09-12 PROCEDURE — 1200000000 HC SEMI PRIVATE

## 2021-09-12 PROCEDURE — 36415 COLL VENOUS BLD VENIPUNCTURE: CPT

## 2021-09-12 PROCEDURE — 99232 SBSQ HOSP IP/OBS MODERATE 35: CPT | Performed by: FAMILY MEDICINE

## 2021-09-12 PROCEDURE — 94640 AIRWAY INHALATION TREATMENT: CPT

## 2021-09-12 PROCEDURE — 2580000003 HC RX 258: Performed by: NURSE PRACTITIONER

## 2021-09-12 PROCEDURE — 6360000002 HC RX W HCPCS: Performed by: NURSE PRACTITIONER

## 2021-09-12 RX ADMIN — VANCOMYCIN HYDROCHLORIDE 750 MG: 10 INJECTION, POWDER, LYOPHILIZED, FOR SOLUTION INTRAVENOUS at 01:56

## 2021-09-12 RX ADMIN — GABAPENTIN 300 MG: 300 CAPSULE ORAL at 12:45

## 2021-09-12 RX ADMIN — ANTACID TABLETS 500 MG: 500 TABLET, CHEWABLE ORAL at 16:07

## 2021-09-12 RX ADMIN — ATORVASTATIN CALCIUM 20 MG: 20 TABLET, FILM COATED ORAL at 09:07

## 2021-09-12 RX ADMIN — HYDROCODONE BITARTRATE AND ACETAMINOPHEN 2 TABLET: 5; 325 TABLET ORAL at 03:42

## 2021-09-12 RX ADMIN — METOCLOPRAMIDE 5 MG: 10 TABLET ORAL at 17:14

## 2021-09-12 RX ADMIN — CLONAZEPAM 0.25 MG: 0.5 TABLET ORAL at 09:07

## 2021-09-12 RX ADMIN — CEFEPIME HYDROCHLORIDE 2000 MG: 2 INJECTION, POWDER, FOR SOLUTION INTRAVENOUS at 21:12

## 2021-09-12 RX ADMIN — QUETIAPINE FUMARATE 25 MG: 25 TABLET ORAL at 09:06

## 2021-09-12 RX ADMIN — PANTOPRAZOLE SODIUM 40 MG: 40 TABLET, DELAYED RELEASE ORAL at 21:05

## 2021-09-12 RX ADMIN — BUDESONIDE AND FORMOTEROL FUMARATE DIHYDRATE 2 PUFF: 160; 4.5 AEROSOL RESPIRATORY (INHALATION) at 22:40

## 2021-09-12 RX ADMIN — BUDESONIDE AND FORMOTEROL FUMARATE DIHYDRATE 2 PUFF: 160; 4.5 AEROSOL RESPIRATORY (INHALATION) at 09:09

## 2021-09-12 RX ADMIN — POLYETHYLENE GLYCOL 3350 17 G: 17 POWDER, FOR SOLUTION ORAL at 09:08

## 2021-09-12 RX ADMIN — SODIUM CHLORIDE, PRESERVATIVE FREE 10 ML: 5 INJECTION INTRAVENOUS at 21:06

## 2021-09-12 RX ADMIN — SUCRALFATE 1 G: 1 TABLET ORAL at 17:14

## 2021-09-12 RX ADMIN — FUROSEMIDE 20 MG: 20 TABLET ORAL at 09:07

## 2021-09-12 RX ADMIN — SUCRALFATE 1 G: 1 TABLET ORAL at 21:05

## 2021-09-12 RX ADMIN — DOCUSATE SODIUM 100 MG: 100 CAPSULE ORAL at 21:05

## 2021-09-12 RX ADMIN — DOCUSATE SODIUM 100 MG: 100 CAPSULE ORAL at 09:07

## 2021-09-12 RX ADMIN — GABAPENTIN 300 MG: 300 CAPSULE ORAL at 21:05

## 2021-09-12 RX ADMIN — SUCRALFATE 1 G: 1 TABLET ORAL at 09:07

## 2021-09-12 RX ADMIN — QUETIAPINE FUMARATE 25 MG: 25 TABLET ORAL at 21:05

## 2021-09-12 RX ADMIN — HYDROCODONE BITARTRATE AND ACETAMINOPHEN 2 TABLET: 5; 325 TABLET ORAL at 21:06

## 2021-09-12 RX ADMIN — BUSPIRONE HYDROCHLORIDE 20 MG: 10 TABLET ORAL at 21:05

## 2021-09-12 RX ADMIN — METOCLOPRAMIDE 5 MG: 10 TABLET ORAL at 06:40

## 2021-09-12 RX ADMIN — METOPROLOL SUCCINATE 25 MG: 25 TABLET, FILM COATED, EXTENDED RELEASE ORAL at 09:07

## 2021-09-12 RX ADMIN — GABAPENTIN 300 MG: 300 CAPSULE ORAL at 09:07

## 2021-09-12 RX ADMIN — TRAZODONE HYDROCHLORIDE 100 MG: 100 TABLET ORAL at 21:05

## 2021-09-12 RX ADMIN — ENOXAPARIN SODIUM 40 MG: 40 INJECTION SUBCUTANEOUS at 09:07

## 2021-09-12 RX ADMIN — HYDROCODONE BITARTRATE AND ACETAMINOPHEN 2 TABLET: 5; 325 TABLET ORAL at 14:27

## 2021-09-12 RX ADMIN — SODIUM CHLORIDE, PRESERVATIVE FREE 10 ML: 5 INJECTION INTRAVENOUS at 09:08

## 2021-09-12 RX ADMIN — PANTOPRAZOLE SODIUM 40 MG: 40 TABLET, DELAYED RELEASE ORAL at 09:07

## 2021-09-12 RX ADMIN — METOCLOPRAMIDE 5 MG: 10 TABLET ORAL at 12:45

## 2021-09-12 RX ADMIN — CLONAZEPAM 0.25 MG: 0.5 TABLET ORAL at 21:05

## 2021-09-12 RX ADMIN — BUSPIRONE HYDROCHLORIDE 20 MG: 10 TABLET ORAL at 09:07

## 2021-09-12 RX ADMIN — BUSPIRONE HYDROCHLORIDE 20 MG: 10 TABLET ORAL at 12:44

## 2021-09-12 RX ADMIN — ANTACID TABLETS 500 MG: 500 TABLET, CHEWABLE ORAL at 12:03

## 2021-09-12 RX ADMIN — CEFEPIME HYDROCHLORIDE 2000 MG: 2 INJECTION, POWDER, FOR SOLUTION INTRAVENOUS at 09:08

## 2021-09-12 RX ADMIN — AMITRIPTYLINE HYDROCHLORIDE 25 MG: 25 TABLET, FILM COATED ORAL at 21:05

## 2021-09-12 RX ADMIN — FERROUS SULFATE TAB EC 325 MG (65 MG FE EQUIVALENT) 325 MG: 325 (65 FE) TABLET DELAYED RESPONSE at 09:07

## 2021-09-12 RX ADMIN — SUCRALFATE 1 G: 1 TABLET ORAL at 12:45

## 2021-09-12 ASSESSMENT — PAIN SCALES - GENERAL
PAINLEVEL_OUTOF10: 7
PAINLEVEL_OUTOF10: 3
PAINLEVEL_OUTOF10: 7
PAINLEVEL_OUTOF10: 8
PAINLEVEL_OUTOF10: 0

## 2021-09-12 NOTE — PLAN OF CARE
Problem: Skin Integrity:  Goal: Will show no infection signs and symptoms  Description: Will show no infection signs and symptoms  9/12/2021 1842 by Mekhi Clay RN  Outcome: Ongoing     Problem: Falls - Risk of:  Goal: Will remain free from falls  Description: Will remain free from falls  9/12/2021 1842 by Mekhi Clay RN  Outcome: Ongoing

## 2021-09-12 NOTE — CARE COORDINATION
Transitional planning. Return to [facility] when discharge- bed hold, no COVID test needed.

## 2021-09-12 NOTE — DISCHARGE INSTR - COC
Continuity of Care Form    Patient Name: Yuriy Damico   :  1945  MRN:  [id number]    Admit date:  2021  Discharge date:  2021    Code Status Order: Full Code   Advance Directives:      Admitting Physician:  Meera Koroma MD  PCP: Aria Freitas MD    Discharging Nurse: [facility] Unit/Room#: 2533/9993-26  Discharging Unit Phone Number:     Emergency Contact:   Extended Emergency Contact Information  Primary Emergency Contact: Niurka Goddard Phone: [phone]  Relation: Child    Past Surgical History:  Past Surgical History:   Procedure Laterality Date    APPENDECTOMY      CARPAL TUNNEL RELEASE      CHOLECYSTECTOMY, LAPAROSCOPIC N/A 2020    XI LAPAROSCOPIC ROBOTIC CHOLECYSTECTOMY, PYLOROPLASTY performed by Braydon Hoang MD at [facility] ERCP  2020    with stent insertion, balloon dilation, sphinctereotomy    ERCP  2020    ** CASE IN OR WITY GI STAFF** ERCP STENT INSERTION performed by Aileen Guzman MD at [facility]    ERCP  2020    ** CASE IN OR WITH GI STAFF**ERCP SPHINCTER/PAPILLOTOMY performed by Aileen Guzman MD at [facility]    ERCP  2020    ** CASE IN OR WITH GI STAFF**ERCP DILATION BALLOON performed by Aileen Guzman MD at [facility]    ERCP N/A 2021    ERCP STENT REMOVAL performed by Jessica Isabel MD at [facility]    ERCP  2021    ERCP STONE REMOVAL performed by Jessica Isabel MD at [address]    GASTRIC FUNDOPLICATION N/A     XI LAPAROSCOPIC ROBOTIC HIATAL HERNIA REPAIR, CHERYL FUNDOPLICATION performed by Braydon Hoang MD at [facility] N/A 2020    EGD PEG TUBE PLACEMENT performed by Braydon Hoang MD at [facility] N/A 2021    **CASE IN O.R. W/ GI STAFF - EGD WITH REMOVAL PEG TUBE performed by Jessica Isabel MD at [address]  HC CATH POWER PICC TRIPLE  03/04/2020         HYSTERECTOMY      Abdominal    JOINT REPLACEMENT Right     right hip    OVARY REMOVAL      RHINOPLASTY      TONSILLECTOMY      TOTAL HIP ARTHROPLASTY      TOTAL NEPHRECTOMY      atrophic from infections, age 14    TRACHEOSTOMY N/A 03/27/2020    **ADD ON **WANTS 10:00AM **TRACHEOTOMY performed by Yamil Ramos MD at [address] 04/02/2020    TRACHEOTOMY EXCHANGE performed by Yamil Ramos MD at [facility] N/A 03/17/2020    BEDSIDE EGD ESOPHAGOGASTRODUODENOSCOPY (ICU) performed by Yamil Ramos MD at [address] [place] [address] N/A 05/18/2020    EGD BIOPSY performed by Ge Mckeon MD at [address] [place] [address]  05/18/2020    EGD DILATION BALLOON performed by Ge Mckeon MD at [address] [place] [address] N/A 07/06/2020    ** CASE IN O.R. WITH GI STAFF** EGD ESOPHAGOGASTRODUODENOSCOPY performed by Brady Boone MD at [address] [place] [address] N/A 11/09/2020    EGD DIAGNOSTIC ONLY performed by Brook Albert MD at [address] [place] [address]  02/08/2021    - EGD WITH REMOVAL PEG TUBE,ERCP STENT REMOVAL,ERCP STONE REMOVAL       Immunization History: There is no immunization history on file for this patient.     Active Problems:  Patient Active Problem List   Diagnosis Code    Frequency of urination R35.0    Back pain M54.9    GERD (gastroesophageal reflux disease) K21.9    MVP (mitral valve prolapse) I34.1    Depression F32.9    Anxiety F41.9    BENEDICT (acute kidney injury) ([address] N17.9    Dysphagia R13.10    Hiatal hernia K44.9    History of repair of hiatal hernia Z98.890, Z87.19    Centrilobular emphysema ([address] J43.2    Esophageal dilatation K22.8    Shock ([address] R57.9    Fever R50.9    Critical illness polyneuropathy ([address] G62.81    Gastric outlet obstruction K31.1    Recurrent UTI N39.0    Tracheostomy in place ([address] Z93.0    H/O gastric ulcer Z87.11    Hyperlipidemia E78.5    Hypertension I10    Tobacco abuse Z72.0    Chronic respiratory failure with hypoxia ([facility]) J96.11    S/P percutaneous endoscopic gastrostomy (PEG) tube placement ([facility]) Z93.1    S/P Nissen fundoplication (without gastrostomy tube) procedure Z98.890    Acute blood loss anemia D62    Phlebitis after infusion T80. 1XXA, I80.9    Esophagitis determined by endoscopy K20.90    Expressive aphasia R47.01    Transient speech disturbance R47.9    Speech disturbance R47.9    Coffee ground emesis K92.0    Acute cystitis without hematuria N30.00    Ulcerative esophagitis K22.10    Sepsis ([facility]) A41.9       Isolation/Infection:   Isolation            Contact          Patient Infection Status       Infection Onset Added Last Indicated Last Indicated By Review Planned Expiration Resolved Resolved By    ESBL (Extended Spectrum Beta Lactamase) 12/13/20 12/15/20 09/09/21 Culture, Urine        MDRO (multi-drug resistant organism) 12/13/20 12/15/20 09/09/21 Culture, Urine        Proteus Urine 2/2021  Klebsiella Urine 12/2020    Resolved    COVID-19 Rule Out 09/09/21 09/09/21 09/09/21 COVID-19, Rapid (Ordered)   09/09/21 Rule-Out Test Resulted    COVID-19 Rule Out 12/13/20 12/13/20 12/13/20 COVID-19 (Ordered)   12/13/20 Rule-Out Test Resulted    COVID-19 Rule Out 07/05/20 07/05/20 07/05/20 COVID-19 (Ordered)   07/05/20 Rule-Out Test Resulted    C-diff Rule Out 05/27/20 05/27/20 05/27/20 C DIFF TOXIN/ANTIGEN (Ordered)   05/29/20 Lewis Be RN    COVID-19 Rule Out 05/20/20 05/20/20 05/20/20 COVID-19 (Ordered)   05/21/20 Rule-Out Test Resulted    COVID-19 Rule Out 05/14/20 05/14/20 05/14/20 COVID-19 (Ordered)   05/14/20 Rule-Out Test Resulted            Nurse Assessment:  Last Vital Signs: BP (!) 149/82   Pulse 84   Temp 98.2 °F (36.8 °C) (Oral)   Resp 18   Ht 5' 2\" (1.575 m)   Wt 187 lb 6.3 oz (85 kg)   SpO2 95%   BMI 34.27 kg/m²     Last documented pain score (0-10 scale): Pain Level: 3  Last Weight:   Wt Readings from Last 1 Encounters:   09/12/21 187 lb 6.3 oz (85 kg)     Mental Status:  oriented    IV Access:  Midline left cephalic    Nursing Mobility/ADLs:  Walking   Assisted  Transfer  Assisted  Bathing  Assisted  Dressing  Assisted  Toileting  Assisted  Feeding  Independent  Med Admin  Assisted  Med Delivery   prefers mixed with choclate pudding    Wound Care Documentation and Therapy:  Wound 04/01/20 Neck Mid;Distal non-healing area of tracheostomy incision (Active)   Number of days: 529       Wound 07/06/20 Coccyx Stage 1 (Active)   Number of days: 432        Elimination:  Continence:   · Bowel: Yes  · Bladder: Yes  Urinary Catheter: None   Colostomy/Ileostomy/Ileal Conduit: No       Date of Last BM: 9/15/2021    Intake/Output Summary (Last 24 hours) at 9/12/2021 1545  Last data filed at 9/12/2021 1240  Gross per 24 hour   Intake --   Output 1350 ml   Net -1350 ml     I/O last 3 completed shifts:  In: -   Out: [address] [Urine:1350]    Safety Concerns: At Risk for Falls    Impairments/Disabilities:      None    Nutrition Therapy:  Current Nutrition Therapy:   - Oral Diet:  General    Routes of Feeding: Oral  Liquids: Thin Liquids  Daily Fluid Restriction: no  Last Modified Barium Swallow with Video (Video Swallowing Test): not done    Treatments at the Time of Hospital Discharge:   Respiratory Treatments: none  Oxygen Therapy:  is not on home oxygen therapy.   Ventilator:    - No ventilator support    Rehab Therapies: Physical Therapy and Occupational Therapy  Weight Bearing Status/Restrictions: No weight bearing restirctions  Other Medical Equipment (for information only, NOT a DME order):  wheelchair and walker  Other Treatments: skilled nursing care and assessments care as prior to admission    Patient's personal belongings (please select all that are sent with patient):  Glasses, cell phone wallet    RN SIGNATURE:  Electronically signed by Chris Michele RN on 9/14/21 at 12:34 PM EDT    CASE MANAGEMENT/SOCIAL WORK SECTION    Inpatient Status Date: ***    Readmission Risk Assessment Score:  Readmission Risk              Risk of Unplanned Readmission:  20           Discharging to Facility/ Agency   · Name: [facility] [address] [place] [address]         Phone: [phone]       Fax: [phone]          Dialysis Facility (if applicable)   · Name:  · Address:  · Dialysis Schedule:  · Phone:  · Fax:    / signature:  Electronically signed by Vola Ormond, RN on 9/15/2021 at 8:23 AM      PHYSICIAN SECTION    Prognosis: Good    Condition at Discharge: Stable    Rehab Potential (if transferring to Rehab): Good    Recommended Labs or Other Treatments After Discharge: Stacy Montesinos till 1/69/3800    Physician Certification: I certify the above information and transfer of [facility]  is necessary for the continuing treatment of the diagnosis listed and that she requires [facility] for less 30 days.      Update Admission H&P: No change in H&P    PHYSICIAN SIGNATURE:  Electronically signed by Justina Velásquez MD on 9/14/21 at 11:06 AM EDT

## 2021-09-12 NOTE — PLAN OF CARE
Problem: Respiratory  Intervention: Respiratory assessment  Note: BRONCHOSPASM/BRONCHOCONSTRICTION    IMPROVE  AERATION/BREATHSOUNDS  ADMINISTER BRONCHODILATOR THERAPY AS APPROPRIATE  ASSESS BREATH SOUNDS  PATIENT EDUCATION AS NEEDED

## 2021-09-12 NOTE — PROGRESS NOTES
[facility]  Office: [address], DO, Edwin Gear, DO, Daly Cruz, DO, Karol Kaya Blood, DO, Von Guevara MD, Reji Chadwick MD, Danny Armstrong MD, Shahid Ferraro MD, Angélica Shore MD, Rosa M Billy MD, Kirsty Morgan MD, Ludivina Fragmin, DO, Anisa Shaikh, DO, Nazia Kaur MD,  Edilma Lopez, DO, Tima Casarez MD, Mamta Royal MD, Anna Guzman MD, Gabriela Shine MD, , Carla Yanez MD, Dhara Marr MD, Sandra Keene MD, Daniel Campos, Eric Jose, CNP, Luisito Tim, CNP, Esther Mask, CNS, Cooper Mcminnville, CNP, Preeti Can, CNP, Danae Mike, CNP, Carlos Shah, CNP, Rolando Holland, CNP, Lendel Nissen, PA-C, Chito Jensen, DNP, Erin Andrews, CNP, Jill Rangel, CNP, Jess Simpson, CNP, Rolando Kelly, CNP, Ligia Garay, CNP, Gay Chow, CNP, Alondra Alfonso, CNP, [facility], [address] [place] [address]    Progress Note    9/12/2021    7:36 AM    Name:   Jennifer Langford  MRN:     [id number]     Acct:      [de-identified]   Room:   [address] [place] [address] Day:  3  Admit Date:  9/9/2021  7:08 PM    PCP:   Reza Louie MD  Code Status:  Full Code    Subjective:     C/C:   Chief Complaint   Patient presents with    Fever    Fatigue    Cough      Interval History Status: improved. Pt was seen and examined this morning  No complaints at this time   States that she feels much better       Review of Systems:     12 point ROS performed and negative for anything other than what was stated in subjective     Medications: Allergies:     Allergies   Allergen Reactions    Prednisone Anxiety    Compazine [Prochlorperazine Maleate]     Penicillin V Potassium     Penicillins Other (See Comments)     Shock- received meropenem and ceftriaxone wo issues 2020       Current Meds:   Scheduled Meds:    docusate sodium  100 mg Oral BID    budesonide-formoterol  2 puff Inhalation BID    cefepime  2,000 mg IntraVENous Q12H    amitriptyline  25 mg Oral Nightly    busPIRone  20 mg Oral TID    clonazePAM  0.25 mg Oral BID    ferrous sulfate  325 mg Oral Daily with breakfast    furosemide  20 mg Oral Daily    gabapentin  300 mg Oral TID    metoclopramide  5 mg Oral TID AC    metoprolol succinate  25 mg Oral Daily    pantoprazole  40 mg Oral BID    polyethylene glycol  17 g Oral Every Other Day    QUEtiapine  25 mg Oral BID    atorvastatin  20 mg Oral Daily    sucralfate  1 g Oral 4x Daily    traZODone  100 mg Oral Nightly    sodium chloride flush  5-40 mL IntraVENous 2 times per day    enoxaparin  40 mg SubCUTAneous Daily    vancomycin  750 mg IntraVENous Q12H    vancomycin (VANCOCIN) intermittent dosing (placeholder)   Other RX Placeholder     Continuous Infusions:    sodium chloride       PRN Meds: HYDROcodone 5 mg - acetaminophen **OR** HYDROcodone 5 mg - acetaminophen, bisacodyl, calcium carbonate, sodium chloride flush, sodium chloride, acetaminophen **OR** acetaminophen    Data:     Past Medical History:   has a past medical history of Anxiety, Arthritis, Back pain, Bronchitis, Caffeine use, Carpal tunnel syndrome, Cataracts, bilateral, Constipation, Depression, Diarrhea, GERD (gastroesophageal reflux disease), H/O gastric ulcer, Hyperlipidemia, Hypertension, Mumps, MVP (mitral valve prolapse), Recurrent UTI, Sinusitis, Tracheostomy in place [facility]), Ulcerative esophagitis, and Wellness examination. Social History:   reports that she has quit smoking. Her smoking use included cigarettes. She has a 50.00 pack-year smoking history. She has never used smokeless tobacco. She reports that she does not drink alcohol and does not use drugs.      Family History:   Family History   Problem Relation Age of Onset    Cancer Mother         uterine    Heart Attack Mother     Heart Attack Father     Other Maternal Grandfather         foot gangrene    Other Paternal Grandmother         bleeding ulcers    Other Paternal 09/09/2021     Lab Results   Component Value Date/Time    SPECIAL NOT REPORTED 09/09/2021 09:30 PM     Lab Results   Component Value Date/Time    CULTURE (A) 09/09/2021 09:30 PM     LACTOSE FERMENTING GRAM NEGATIVE RODS >[id number] CFU/ML       Radiology:  CT ABDOMEN PELVIS W IV CONTRAST Additional Contrast? None    Result Date: 9/9/2021  1. Persisting moderate hiatal hernia with distal esophageal moderate distension and fluid-filled lumen, possibly associated with gastroesophageal reflux. 2. Marked diffuse hepatic steatosis. 3. Hepatomegaly. 4. Bilateral lower lung lobe and left lingular mild scattered bronchiectasis with surrounding multifocal mild scattered consolidation. Findings suggest presence of pneumonia in setting of changes related to chronic infection. Recommend clinical correlation. XR CHEST PORTABLE    Result Date: 9/10/2021  Chronic pulmonary change without acute cardiopulmonary process.      XR CHEST PORTABLE    Result Date: 9/9/2021  No acute cardiopulmonary disease       Physical Examination:        General appearance:  alert, cooperative and no distress  Mental Status:  oriented to person, place and time and normal affect  Lungs:  clear to auscultation bilaterally, normal effort  Heart:  regular rate and rhythm, no murmur  Abdomen:  soft, nontender, nondistended, normal bowel sounds  Extremities:  no edema, redness, tenderness in the calves  Skin:  no gross lesions, rashes, induration    Assessment:        Hospital Problems         Last Modified POA    * (Principal) Sepsis ([address] 9/10/2021 Yes    GERD (gastroesophageal reflux disease) 9/10/2021 Yes    Depression 9/10/2021 Yes    Anxiety 9/10/2021 Yes    Hiatal hernia 9/10/2021 Yes    Recurrent UTI 9/10/2021 Yes    Overview Signed 5/12/2020  5:22 PM by MD Dr. Molina Arita         Hyperlipidemia 9/10/2021 Yes    Hypertension 9/10/2021 Yes    S/P Nissen fundoplication (without gastrostomy tube) procedure 9/10/2021 Yes          Plan:

## 2021-09-12 NOTE — PROGRESS NOTES
Physical Therapy        Physical Therapy Cancel Note      DATE: 2021    NAME: Ayse Thakur  MRN: [id number]   : 1945      Patient not seen this date for Physical Therapy due to:    Patient Declined: d/t eating lunch. States she does not want to work with therapy until she is finished.  Ck back as able      Electronically signed by Jojo Wolfe PT on 2021 at 12:35 PM

## 2021-09-12 NOTE — PLAN OF CARE
Problem: Skin Integrity:  Goal: Will show no infection signs and symptoms  Description: Will show no infection signs and symptoms  9/12/2021 0501 by Cristiana Mckeon RN  Outcome: Ongoing  9/11/2021 1751 by Falguni Harding RN  Outcome: Ongoing  Goal: Absence of new skin breakdown  Description: Absence of new skin breakdown  9/12/2021 0501 by Cristiana Mckeon RN  Outcome: Ongoing  9/11/2021 1751 by Falguni Harding RN  Outcome: Ongoing     Problem: Falls - Risk of:  Goal: Will remain free from falls  Description: Will remain free from falls  9/12/2021 0501 by Cristiana Mckeon RN  Outcome: Ongoing  9/11/2021 1751 by Falguni Harding RN  Outcome: Ongoing  Goal: Absence of physical injury  Description: Absence of physical injury  9/12/2021 0501 by Cristiana Mckeon RN  Outcome: Ongoing  9/11/2021 1751 by Falguni Harding RN  Outcome: Ongoing     Problem: Pain:  Goal: Pain level will decrease  Description: Pain level will decrease  9/12/2021 0501 by Cristiana Mckeon RN  Outcome: Ongoing  9/11/2021 1751 by Falguni Harding RN  Outcome: Ongoing  Goal: Control of acute pain  Description: Control of acute pain  9/12/2021 0501 by Cristiana Mckeon RN  Outcome: Ongoing  9/11/2021 1751 by Falguni Harding RN  Outcome: Ongoing  Goal: Control of chronic pain  Description: Control of chronic pain  9/12/2021 0501 by Cristiana Mckeon RN  Outcome: Ongoing  9/11/2021 1751 by Falguni Harding RN  Outcome: Ongoing     Problem: Coping:  Goal: Expressions of feelings of enhanced comfort will increase  Description: Expressions of feelings of enhanced comfort will increase  9/12/2021 0501 by Cristiana Mckeon RN  Outcome: Ongoing  9/11/2021 1751 by Falguni Harding RN  Outcome: Ongoing     Problem: Urinary Elimination:  Goal: Ability to achieve a balanced intake and output will improve  Description: Ability to achieve a balanced intake and output will improve  9/12/2021 0501 by Cristiana Mckeon RN  Outcome: Ongoing  9/11/2021 1751 by Aneudy Horta RN  Outcome: Ongoing  Goal: Ability to recognize the need to void and respond appropriately will improve  Description: Ability to recognize the need to void and respond appropriately will improve  9/12/2021 0501 by Maribel Giron RN  Outcome: Ongoing  9/11/2021 1751 by Aneudy Horta RN  Outcome: Ongoing     Problem: Discharge Planning:  Goal: Discharged to appropriate level of care  Description: Discharged to appropriate level of care  9/12/2021 0501 by Maribel Giron RN  Outcome: Ongoing  9/11/2021 1751 by Aneudy Horta RN  Outcome: Ongoing     Problem: Gas Exchange - Impaired:  Goal: Levels of oxygenation will improve  Description: Levels of oxygenation will improve  9/12/2021 0501 by Maribel Giron RN  Outcome: Ongoing  9/11/2021 1751 by Aneudy Horta RN  Outcome: Ongoing     Problem: Infection, Septic Shock:  Goal: Will show no infection signs and symptoms  Description: Will show no infection signs and symptoms  9/12/2021 0501 by Maribel Giron RN  Outcome: Ongoing  9/11/2021 1751 by Aneudy Horta RN  Outcome: Ongoing     Problem: Tissue Perfusion, Altered:  Goal: Circulatory function within specified parameters  Description: Circulatory function within specified parameters  9/12/2021 0501 by Maribel Giron RN  Outcome: Ongoing  9/11/2021 1751 by Aneudy Horta RN  Outcome: Ongoing

## 2021-09-13 LAB
ANION GAP SERPL CALCULATED.3IONS-SCNC: 13 MMOL/L (ref 9–17)
BUN BLDV-MCNC: 12 MG/DL (ref 8–23)
BUN/CREAT BLD: ABNORMAL (ref 9–20)
CALCIUM SERPL-MCNC: 9 MG/DL (ref 8.6–10.4)
CHLORIDE BLD-SCNC: 101 MMOL/L (ref 98–107)
CO2: 22 MMOL/L (ref 20–31)
CREAT SERPL-MCNC: 0.87 MG/DL (ref 0.5–0.9)
GFR AFRICAN AMERICAN: >60 ML/MIN
GFR NON-AFRICAN AMERICAN: >60 ML/MIN
GFR SERPL CREATININE-BSD FRML MDRD: ABNORMAL ML/MIN/{1.73_M2}
GFR SERPL CREATININE-BSD FRML MDRD: ABNORMAL ML/MIN/{1.73_M2}
GLUCOSE BLD-MCNC: 102 MG/DL (ref 70–99)
POTASSIUM SERPL-SCNC: 3.3 MMOL/L (ref 3.7–5.3)
SODIUM BLD-SCNC: 136 MMOL/L (ref 135–144)

## 2021-09-13 PROCEDURE — 1200000000 HC SEMI PRIVATE

## 2021-09-13 PROCEDURE — 2580000003 HC RX 258: Performed by: NURSE PRACTITIONER

## 2021-09-13 PROCEDURE — 80048 BASIC METABOLIC PNL TOTAL CA: CPT

## 2021-09-13 PROCEDURE — 6370000000 HC RX 637 (ALT 250 FOR IP): Performed by: FAMILY MEDICINE

## 2021-09-13 PROCEDURE — 94640 AIRWAY INHALATION TREATMENT: CPT

## 2021-09-13 PROCEDURE — 6360000002 HC RX W HCPCS: Performed by: NURSE PRACTITIONER

## 2021-09-13 PROCEDURE — 94760 N-INVAS EAR/PLS OXIMETRY 1: CPT

## 2021-09-13 PROCEDURE — 99222 1ST HOSP IP/OBS MODERATE 55: CPT | Performed by: INTERNAL MEDICINE

## 2021-09-13 PROCEDURE — 6360000002 HC RX W HCPCS: Performed by: INTERNAL MEDICINE

## 2021-09-13 PROCEDURE — 2580000003 HC RX 258: Performed by: FAMILY MEDICINE

## 2021-09-13 PROCEDURE — 6360000002 HC RX W HCPCS: Performed by: FAMILY MEDICINE

## 2021-09-13 PROCEDURE — 36415 COLL VENOUS BLD VENIPUNCTURE: CPT

## 2021-09-13 PROCEDURE — 99232 SBSQ HOSP IP/OBS MODERATE 35: CPT | Performed by: FAMILY MEDICINE

## 2021-09-13 PROCEDURE — 6370000000 HC RX 637 (ALT 250 FOR IP): Performed by: NURSE PRACTITIONER

## 2021-09-13 PROCEDURE — 2580000003 HC RX 258: Performed by: INTERNAL MEDICINE

## 2021-09-13 RX ORDER — POTASSIUM CHLORIDE 20 MEQ/1
40 TABLET, EXTENDED RELEASE ORAL ONCE
Status: COMPLETED | OUTPATIENT
Start: 2021-09-13 | End: 2021-09-13

## 2021-09-13 RX ADMIN — GABAPENTIN 300 MG: 300 CAPSULE ORAL at 08:35

## 2021-09-13 RX ADMIN — SUCRALFATE 1 G: 1 TABLET ORAL at 18:19

## 2021-09-13 RX ADMIN — BUSPIRONE HYDROCHLORIDE 20 MG: 10 TABLET ORAL at 08:36

## 2021-09-13 RX ADMIN — ENOXAPARIN SODIUM 40 MG: 40 INJECTION SUBCUTANEOUS at 08:40

## 2021-09-13 RX ADMIN — BUSPIRONE HYDROCHLORIDE 20 MG: 10 TABLET ORAL at 14:27

## 2021-09-13 RX ADMIN — ANTACID TABLETS 500 MG: 500 TABLET, CHEWABLE ORAL at 00:48

## 2021-09-13 RX ADMIN — ANTACID TABLETS 500 MG: 500 TABLET, CHEWABLE ORAL at 11:07

## 2021-09-13 RX ADMIN — METOCLOPRAMIDE 5 MG: 10 TABLET ORAL at 06:12

## 2021-09-13 RX ADMIN — FUROSEMIDE 20 MG: 20 TABLET ORAL at 08:36

## 2021-09-13 RX ADMIN — QUETIAPINE FUMARATE 25 MG: 25 TABLET ORAL at 08:38

## 2021-09-13 RX ADMIN — BUDESONIDE AND FORMOTEROL FUMARATE DIHYDRATE 2 PUFF: 160; 4.5 AEROSOL RESPIRATORY (INHALATION) at 22:15

## 2021-09-13 RX ADMIN — METOCLOPRAMIDE 5 MG: 10 TABLET ORAL at 11:11

## 2021-09-13 RX ADMIN — MEROPENEM 500 MG: 500 INJECTION, POWDER, FOR SOLUTION INTRAVENOUS at 18:24

## 2021-09-13 RX ADMIN — BUDESONIDE AND FORMOTEROL FUMARATE DIHYDRATE 2 PUFF: 160; 4.5 AEROSOL RESPIRATORY (INHALATION) at 09:05

## 2021-09-13 RX ADMIN — PANTOPRAZOLE SODIUM 40 MG: 40 TABLET, DELAYED RELEASE ORAL at 20:12

## 2021-09-13 RX ADMIN — TRAZODONE HYDROCHLORIDE 100 MG: 100 TABLET ORAL at 20:12

## 2021-09-13 RX ADMIN — CLONAZEPAM 0.25 MG: 0.5 TABLET ORAL at 08:35

## 2021-09-13 RX ADMIN — GABAPENTIN 300 MG: 300 CAPSULE ORAL at 14:27

## 2021-09-13 RX ADMIN — DOCUSATE SODIUM 100 MG: 100 CAPSULE ORAL at 08:36

## 2021-09-13 RX ADMIN — SUCRALFATE 1 G: 1 TABLET ORAL at 20:12

## 2021-09-13 RX ADMIN — SUCRALFATE 1 G: 1 TABLET ORAL at 08:35

## 2021-09-13 RX ADMIN — QUETIAPINE FUMARATE 25 MG: 25 TABLET ORAL at 20:12

## 2021-09-13 RX ADMIN — POTASSIUM CHLORIDE 40 MEQ: 1500 TABLET, EXTENDED RELEASE ORAL at 09:56

## 2021-09-13 RX ADMIN — ANTACID TABLETS 500 MG: 500 TABLET, CHEWABLE ORAL at 18:32

## 2021-09-13 RX ADMIN — MEROPENEM 1000 MG: 1 INJECTION, POWDER, FOR SOLUTION INTRAVENOUS at 11:07

## 2021-09-13 RX ADMIN — METOPROLOL SUCCINATE 25 MG: 25 TABLET, FILM COATED, EXTENDED RELEASE ORAL at 08:35

## 2021-09-13 RX ADMIN — CLONAZEPAM 0.25 MG: 0.5 TABLET ORAL at 20:11

## 2021-09-13 RX ADMIN — SUCRALFATE 1 G: 1 TABLET ORAL at 13:12

## 2021-09-13 RX ADMIN — GABAPENTIN 300 MG: 300 CAPSULE ORAL at 20:12

## 2021-09-13 RX ADMIN — HYDROCODONE BITARTRATE AND ACETAMINOPHEN 1 TABLET: 5; 325 TABLET ORAL at 16:13

## 2021-09-13 RX ADMIN — ATORVASTATIN CALCIUM 20 MG: 20 TABLET, FILM COATED ORAL at 08:36

## 2021-09-13 RX ADMIN — PANTOPRAZOLE SODIUM 40 MG: 40 TABLET, DELAYED RELEASE ORAL at 08:37

## 2021-09-13 RX ADMIN — BUSPIRONE HYDROCHLORIDE 20 MG: 10 TABLET ORAL at 20:12

## 2021-09-13 RX ADMIN — HYDROCODONE BITARTRATE AND ACETAMINOPHEN 1 TABLET: 5; 325 TABLET ORAL at 09:56

## 2021-09-13 RX ADMIN — FERROUS SULFATE TAB EC 325 MG (65 MG FE EQUIVALENT) 325 MG: 325 (65 FE) TABLET DELAYED RESPONSE at 08:36

## 2021-09-13 RX ADMIN — SODIUM CHLORIDE, PRESERVATIVE FREE 10 ML: 5 INJECTION INTRAVENOUS at 08:38

## 2021-09-13 RX ADMIN — AMITRIPTYLINE HYDROCHLORIDE 25 MG: 25 TABLET, FILM COATED ORAL at 20:12

## 2021-09-13 RX ADMIN — METOCLOPRAMIDE 5 MG: 10 TABLET ORAL at 16:14

## 2021-09-13 RX ADMIN — DOCUSATE SODIUM 100 MG: 100 CAPSULE ORAL at 20:12

## 2021-09-13 ASSESSMENT — PAIN SCALES - GENERAL
PAINLEVEL_OUTOF10: 4
PAINLEVEL_OUTOF10: 6
PAINLEVEL_OUTOF10: 3
PAINLEVEL_OUTOF10: 6

## 2021-09-13 NOTE — CONSULTS
made it difficult for her to eat her breakfast this morning. Labs, X rays reviewed: 9/13/2021    BUN:12  Cr:0.87    (Results from 9/9/21)  WBC:13.2  Hb:12.9  Plat: 16.1    Cultures:  Urine: 9/9/21: Klebsiella Pneumoniae. ESBL +  ·   Blood: 9/9/21 : no growth  ·   Sputum :  ·   Wound:  ·     Discussed with patient, RN. I have personally reviewed the past medical history, past surgical history, medications, social history, and family history, and I have updated the database accordingly.   Past Medical History:     Past Medical History:   Diagnosis Date    Anxiety     Arthritis     Back pain     Bronchitis     Caffeine use     8 coffee / day    Carpal tunnel syndrome     Cataracts, bilateral     Constipation     Depression     Diarrhea     GERD (gastroesophageal reflux disease)     H/O gastric ulcer     Hyperlipidemia     Hypertension     Mumps     MVP (mitral valve prolapse)     Dr. Danuta Singletary in May 2019    Recurrent UTI     Dr. Chris Pickett    Sinusitis     Trache[facility] in place [facility])     Ulcerative esophagitis     Wellness examination     Dr. Tatyana Tompkins seen in Jan 2020       Past Surgical  History:     Past Surgical History:   Procedure Laterality Date    APPENDECTOMY      CARPAL TUNNEL RELEASE      CHOLECYSTECTOMY, LAPAROSCOPIC N/A 05/22/2020    XI LAPAROSCOPIC ROBOTIC CHOLECYSTECTOMY, PYLOROPLASTY performed by Brianna Roberts MD at [facility] ERCP  05/21/2020    with stent insertion, balloon dilation, sphinctereotomy    ERCP  05/21/2020    ** CASE IN OR WITY GI STAFF** ERCP STENT INSERTION performed by Sylvia Lion MD at [facility]    ERCP  05/21/2020    ** CASE IN OR WITH GI STAFF**ERCP SPHINCTER/PAPILLOTOMY performed by Sylvia Lion MD at [facility]    ERCP  05/21/2020    ** CASE IN OR WITH GI STAFF**ERCP DILATION BALLOON performed by Sylvia Lion MD at [facility]    ERCP N/A 2/8/2021    ERCP STENT REMOVAL performed by Daniel Muller MD at [facility]    ERCP  2/8/2021    ERCP STONE REMOVAL performed by Charo Quezada MD at [address]      patricia IOL    GASTRIC FUNDOPLICATION N/A 28/48/6355    XI LAPAROSCOPIC ROBOTIC HIATAL HERNIA REPAIR, CHERYL FUNDOPLICATION performed by Cynthia Lua MD at [facility] N/A 03/27/2020    EGD PEG TUBE PLACEMENT performed by Cynthia Lua MD at [facility] N/A 2/8/2021    **CASE IN O.R. W/ GI STAFF - EGD WITH REMOVAL PEG TUBE performed by Charo Quezada MD at [facility]    HAND SURGERY      [place] CATH POWER PICC TRIPLE  03/04/2020         HYSTERECTOMY      Abdominal    JOINT REPLACEMENT Right     right hip    OVARY REMOVAL      RHINOPLASTY      TONSILLECTOMY      TOTAL HIP ARTHROPLASTY      TOTAL NEPHRECTOMY      atrophic from infections, age 13   24 [facility] TRACHEOSTOMY N/A 03/27/2020    **ADD ON **WANTS 10:00AM **TRACHEOTOMY performed by Cynthia Lua MD at [address] [place] [address] N/A 04/02/2020    TRACHEOTOMY EXCHANGE performed by Cynthia Lua MD at [address] [place] [address] N/A 03/17/2020    BEDSIDE EGD ESOPHAGOGASTRODUODENOSCOPY (ICU) performed by Cynthia Lua MD at [address] [place] [address] N/A 05/18/2020    EGD BIOPSY performed by Luke Lee MD at [address] [place] [address]  05/18/2020    EGD DILATION BALLOON performed by Luke Lee MD at [address] [place] [address] N/A 07/06/2020    ** CASE IN O.R. WITH GI STAFF** EGD ESOPHAGOGASTRODUODENOSCOPY performed by Cr Mcqueen MD at [address] [place] [address] 11/09/2020    EGD DIAGNOSTIC ONLY performed by Nidhi Manzano MD at [address] [place] [address]  02/08/2021    - EGD WITH REMOVAL PEG TUBE,ERCP STENT REMOVAL,ERCP STONE REMOVAL       Medications:      meropenem  1,000 mg IntraVENous Q8H    docusate sodium  100 mg Oral BID    budesonide-formoterol  2 puff Inhalation BID    amitriptyline  25 mg Oral Nightly    busPIRone  20 mg Oral TID    clonazePAM  0.25 mg Oral BID    ferrous sulfate  325 mg Oral Daily with breakfast    furosemide  20 mg Oral Daily    gabapentin  300 mg Oral TID    metoclopramide  5 mg Oral TID AC    metoprolol succinate  25 mg Oral Daily    pantoprazole  40 mg Oral BID    polyethylene glycol  17 g Oral Every Other Day    QUEtiapine  25 mg Oral BID    atorvastatin  20 mg Oral Daily    sucralfate  1 g Oral 4x Daily    traZODone  100 mg Oral Nightly    sodium chloride flush  5-40 mL IntraVENous 2 times per day    enoxaparin  40 mg SubCUTAneous Daily       Social History:     Social History     Socioeconomic History    Marital status:      Spouse name: Not on file    Number of children: 2    Years of education: Not on file    Highest education level: Not on file   Occupational History    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   Tobacco Use    Smoking status: Former Smoker     Packs/day: 1.00     Years: 50.00     Pack years: 50.00     Types: Cigarettes    Smokeless tobacco: Never Used    Tobacco comment: quit 1 year ago    Vaping Use    Vaping Use: Former    Substances: Always   Substance and Sexual Activity    Alcohol use: No    Drug use: No    Sexual activity: Never   Other Topics Concern    Not on file   Social History Narrative    Not on file     Social Determinants of Health     Financial Resource Strain:     Difficulty of Paying Living Expenses:    Food Insecurity:     Worried About [address] in the Last Year:     [facility] in the Last Year:    Transportation Needs:     Lack of Transportation (Medical):      Lack of Transportation (Non-Medical):    Physical Activity:     Days of Exercise per Week:     Minutes of Exercise per Session:    Stress:     Feeling of Stress :    Social Connections:     Frequency of Communication with Friends and Family:     Frequency of Social Gatherings with Friends and Family:     Attends Judaism Services:     Active Member of Clubs or Organizations:     Attends Club or Organization Meetings:     Marital Status:    Intimate Partner Violence:     Fear of Current or Ex-Partner:     Emotionally Abused:     Physically Abused:     Sexually Abused:        Family History:     Family History   Problem Relation Age of Onset    Cancer Mother         uterine    Heart Attack Mother     Heart Attack Father     Other Maternal Grandfather         foot gangrene    Other Paternal Grandmother         bleeding ulcers    Other Paternal Grandfather         lung cancer        Allergies:   Prednisone, Compazine [prochlorperazine maleate], Penicillin v potassium, and Penicillins     Review of Systems:   Constitutional: No fevers or chills. No systemic complaints  Head: No headaches  Eyes: No double vision or blurry vision. No conjunctival inflammation. ENT: No sore throat or runny nose. . No hearing loss, tinnitus or vertigo. Cardiovascular: No chest pain or palpitations. No shortness of breath. No HOLLAND  Lung: No shortness of breath or cough. No sputum production  Abdomen: No nausea, vomiting, diarrhea, or abdominal pain. Cindy Salen No cramps. Genitourinary: No increased urinary frequency, or dysuria. No hematuria. No suprapubic or CVA pain  Musculoskeletal: No muscle aches or pains. No joint effusions, swelling or deformities  Hematologic: No bleeding or bruising. Neurologic: No headache, weakness, numbness, or tingling. Integument: No rash, no ulcers. Psychiatric: No depression. Endocrine: No polyuria, no polydipsia, no polyphagia.     Physical Examination :     Patient Vitals for the past 8 hrs:   BP Temp Temp src Pulse Resp SpO2   09/13/21 0800 (!) 162/84 99.1 °F (37.3 °C) Oral 89 18 92 %     General Appearance: Awake, alert, and in no apparent distress  Head:  Normocephalic, no trauma  Eyes: Pupils equal, round, reactive to light and accommodation; extraocular movements intact; sclera anicteric; conjunctivae pink. No embolic phenomena. ENT: Oropharynx clear, without erythema, exudate, or thrush. No tenderness of sinuses. Mouth/throat: mucosa pink and moist. No lesions. Dentition in good repair. Neck:Supple, without lymphadenopathy. Thyroid normal, No bruits. Pulmonary/Chest: Clear to auscultation, without wheezes, rales, or rhonchi. No dullness to percussion. Cardiovascular: Regular rate and rhythm without murmurs, rubs, or gallops. Abdomen: Soft, non tender. Bowel sounds normal. No organomegaly  All four Extremities: No cyanosis, clubbing, edema, or effusions. Neurologic: No gross sensory or motor deficits. Skin: Warm and dry with good turgor. No signs of peripheral arterial or venous insufficiency. No ulcerations. No open wounds. Medical Decision Making -Laboratory:   I have independently reviewed/ordered the following labs:    CBC with Differential: No results for input(s): WBC, HGB, HCT, PLT, SEGSPCT, BANDSPCT, LYMPHOPCT, MONOPCT, EOSPCT in the last 72 hours. BMP:   Recent Labs     09/12/21  0730 09/13/21  0624    136   K 3.8 3.3*    101   CO2 21 22   BUN 9 12   CREATININE 0.83 0.87     Hepatic Function Panel: No results for input(s): PROT, LABALBU, BILIDIR, IBILI, BILITOT, ALKPHOS, ALT, AST in the last 72 hours. No results for input(s): RPR in the last 72 hours. No results for input(s): HIV in the last 72 hours. No results for input(s): BC in the last 72 hours.   Lab Results   Component Value Date    MUCUS NOT REPORTED 09/09/2021    RBC 4.63 09/10/2021    TRICHOMONAS NOT REPORTED 09/09/2021    WBC 13.2 09/10/2021    YEAST NOT REPORTED 09/09/2021    TURBIDITY CLEAR 09/09/2021     Lab Results   Component Value Date    CREATININE 0.87 09/13/2021    GLUCOSE 102 09/13/2021       Medical Decision Making-Imaging:     CT ABDOMEN PELVIS W IV CONTRAST  Narrative   EXAMINATION:   CT OF THE ABDOMEN AND PELVIS WITH CONTRAST 9/9/2021 10:49 pm       TECHNIQUE:   CT of the abdomen and pelvis was performed with the administration of   intravenous contrast. Multiplanar reformatted images are provided for review. Dose modulation, iterative reconstruction, and/or weight based adjustment of   the mA/kV was utilized to reduce the radiation dose to as low as reasonably   achievable.       COMPARISON:   Abdomen KUB Single AP view February 7, 2021.  CT abdomen and pelvis with   contrast November 7, 2020.       HISTORY:   ORDERING SYSTEM PROVIDED HISTORY: Abdominal pain, sepsis   TECHNOLOGIST PROVIDED HISTORY:       Abdominal pain, sepsis   Decision Support Exception - unselect if not a suspected or confirmed   emergency medical condition->Emergency Medical Condition (MA)   Reason for Exam: Abdominal pain, sepsis   Acuity: Acute   Type of Exam: Initial       FINDINGS:       Abdomen/Pelvis:       Lower chest: Bilateral lower lung lobe and left lingular mild bronchiectasis   is seen with surrounding multifocal mild scattered consolidation present. The heart is mildly enlarged. .  Pleural surfaces are unremarkable and no   evidence of pleural effusion is identified.       Organs: Marked diffuse fatty infiltration of the liver is visualized.  The   liver is enlarged measuring up to 20.1 cm in craniocaudad extent.  The right   kidney is absent.  Left renal small simple cyst is present.  The liver,   gallbladder, spleen, pancreas, adrenal glands, left kidney, are otherwise   unremarkable in appearance.       GI/Bowel: Moderate hiatal hernia is present with distal esophageal moderate   distension and fluid-filled lumen present.  The stomach is otherwise   unremarkable without wall thickening or distention.  Bowel loops are   unremarkable in appearance without evidence of obstruction, distension or   mucosal thickening.       Pelvis:  The urinary bladder is mildly distended and grossly unremarkable in   appearance.  No evidence of pelvic free fluid is seen.       Peritoneum/Retroperitoneum: No evidence of retroperitoneal or intraperitoneal   lymphadenopathy is identified.  No evidence of intraperitoneal free fluid is   seen.       Bones/Soft Tissues: Thoracolumbar levoscoliosis is present.  Right hip total   arthroplasty is noted without evidence of complication seen.  The bones,   skeletal muscle bundles, fascial planes and subcutaneous soft tissues are   otherwise unremarkable in appearance.           Impression   1. Persisting moderate hiatal hernia with distal esophageal moderate   distension and fluid-filled lumen, possibly associated with gastroesophageal   reflux. 2. Marked diffuse hepatic steatosis. 3. Hepatomegaly. 4. Bilateral lower lung lobe and left lingular mild scattered bronchiectasis   with surrounding multifocal mild scattered consolidation.  Findings suggest   presence of pneumonia in setting of changes related to chronic infection. Recommend clinical correlation. XR CHEST PORTABLE  Narrative   EXAMINATION:   ONE XRAY VIEW OF THE CHEST       9/10/2021 8:02 am       COMPARISON:   Chest radiograph performed 09/09/2021.       HISTORY:   ORDERING SYSTEM PROVIDED HISTORY: sob   TECHNOLOGIST PROVIDED HISTORY:   sob       FINDINGS:   There is chronic pulmonary change.  There is no acute consolidation or   effusion.  There is no pneumothorax.  Mediastinal structures are   unremarkable.  The upper abdomen is unremarkable.  The extrathoracic soft   tissues are unremarkable.           Impression   Chronic pulmonary change without acute cardiopulmonary process.             Medical Decision [id number]:     9/12/2021  2:46 PM - Bryan, [facility] Incoming Lab Results From [facility]    Specimen Information: Urine, clean catch        Component Collected Lab   Specimen Description 09/09/2021  9:30  Nikita Guerra   . CLEAN CATCH URINE    Special Requests 09/09/2021  9:30  Nikita Guerra   NOT REPORTED    Culture Abnormal  09/09/2021  9:30  [facility] >[id number] CFU/ML THIS ORGANISM IS AN EXTENDED-SPECTRUM BETA-LACTAMASE  AND RESISTANCE TO THERAPY WITH PENICILLINS, CEPHALOSPORINS AND AZTREONAM IS EXPECTED.  THESE ORGANISMS GENERALLY REMAIN SUSCEPTIBLE TO CARBAPENEMS.  CONSIDER ID CONSULTATION. Testing Performed By    Sun Osborne Name Director Address Valid Date Range   [address]Angela Wilson MD [address] [facility] [address] 08/30/17 0801-Present   Susceptibility    Klebsiella pneumoniae (1)    Antibiotic Interpretation MOHAN Status   amikacin  NOT REPORTED Final   ampicillin Resistant >=32 Final   ampicillin-sulbactam  NOT REPORTED Final   aztreonam Resistant >=64 Final   ceFAZolin Resistant >=64 Final   ceFAZolin Resistant Cefazolin sensitivity results can be used to predict the effectiveness of oral cephalosporins (eg.  Cephalexin) in uncomplicated Urinary Tract Infections due to E. coli, K. pneumoniae, and P. mirabilis Final   cefepime Resistant 8 Final   cefTRIAXone Resistant >=64 Final   ciprofloxacin Resistant >=4 Final   ertapenem  NOT REPORTED Final   Confirmatory Extended Spectrum Beta-Lactamase Positive POSITIVE Final   gentamicin Sensitive <=1 Final   meropenem Sensitive <=0.25 Final   nitrofurantoin Sensitive 32 Final   tigecycline  NOT REPORTED Final   tobramycin Intermediate 8 Final   trimethoprim-sulfamethoxazole Resistant >=320 Final   piperacillin-tazobactam Resistant 32 Final       Medical Decision Making-Other:     Note:  · Labs, medications, radiologic studies were reviewed with personal review of films  · Large amounts of data were reviewed  · Discussed with nursing Staff, Discharge planner  · Infection Control and Prevention measures reviewed  · All prior entries were reviewed  · Administer medications as ordered  · Prognosis: Good  · Discharge planning reviewed    Thank you for allowing us to participate in the care of this patient. Please call with questions. Christel Simmons DPM     ATTESTATION:    I have discussed the case, including pertinent history and exam findings with the residents and students. I have seen and examined the patient and the key elements of the encounter have been performed by me. I was present when the student obtained his information or examined the patient. I have reviewed the laboratory data, other diagnostic studies and discussed them with the residents. I have updated the medical record where necessary. I agree with the assessment, plan and orders as documented by the resident/ student.     Abdelrahman Vyas MD.    Pager: [phone] - Office: [phone]

## 2021-09-13 NOTE — PLAN OF CARE
symptoms  Outcome: Ongoing     Problem: Tissue Perfusion, Altered:  Goal: Circulatory function within specified parameters  Description: Circulatory function within specified parameters  Outcome: Ongoing

## 2021-09-13 NOTE — PROGRESS NOTES
[facility] - Initial Consult Note  Today's Date and Time: 9/13/2021, 10:46 AM    Impression :   · Urinary tract infection    Recommendations:   · Antibiotics changed from cefepime to meropenem 1 g every 8 hours  · Case management working on transition plan. Patient to return back to [facility] when discharge per case management. Medical Decision Making/Summary/Discussion:9/13/2021     ·   Infection Control Recommendations   · Chauncey Precautions    Antimicrobial Stewardship Recommendations     · Simplification of therapy  · Targeted therapy    Coordination of Outpatient Care:   · Estimated Length of IV antimicrobials:  · Patient will need Midline Catheter Insertion:   · Patient will need PICC line Insertion:  · Patient will need: Home IV , [place],  SNF,  LTAC:  · Patient will need outpatient wound care:    Chief complaint/reason for consultation:   · Antibiotic management for UTI      History of Present Illness:   Finn Madrigal is a 76y.o.-year-old female who was initially admitted on 9/9/2021. Patient seen at the request of Dr. Reena Cannon:  Patient presented to ED on 9/9/2021 for complaint of fever, general ill feeling. She states that this has been worsening since 9/7/2021. She reports of cough as well as abdominal pain. Patient lives at an extended care facility. Urine analysis was performed which was positive for UTI. Patient was started on cefepime and vancomycin. CURRENT EVALUATION: 9/13/2021  Patient was seen at bedside  Patient denies any nausea, vomiting, fever, chills or shortness of breath. Patient states that she feels better from her initial visit from the ED. She noticed weird smell when she urinates. She states that she has decreased burning sensation when urinating and is able to urinate without any obstruction.       She does complain of acid reflux which made it difficult for her to eat her breakfast this morning. Labs, X rays reviewed: 9/13/2021    BUN:12  Cr:0.87    (Results from 9/9/21)  WBC:13.2  Hb:12.9  Plat: 16.1    Cultures:  Urine: 9/9/21: Klebsiella Pneumoniae. ·   Blood: 9/9/21 : no growth  ·   Sputum :  ·   Wound:  ·     Discussed with patient, RN. I have personally reviewed the past medical history, past surgical history, medications, social history, and family history, and I have updated the database accordingly.   Past Medical History:     Past Medical History:   Diagnosis Date    Anxiety     Arthritis     Back pain     Bronchitis     Caffeine use     8 coffee / day    Carpal tunnel syndrome     Cataracts, bilateral     Constipation     Depression     Diarrhea     GERD (gastroesophageal reflux disease)     H/O gastric ulcer     Hyperlipidemia     Hypertension     Mumps     MVP (mitral valve prolapse)     Dr. Ryanne Lozano in May 2019    Recurrent UTI     Dr. Calderon Na    Sinusitis     Trache[facility] in place [facility])     Ulcerative esophagitis     Wellness examination     Dr. Samano Ear seen in Jan 2020       Past Surgical  History:     Past Surgical History:   Procedure Laterality Date    APPENDECTOMY      CARPAL TUNNEL RELEASE      CHOLECYSTECTOMY, LAPAROSCOPIC N/A 05/22/2020    XI LAPAROSCOPIC ROBOTIC CHOLECYSTECTOMY, PYLOROPLASTY performed by Valerie Carter MD at [facility] ERCP  05/21/2020    with stent insertion, balloon dilation, sphinctereotomy    ERCP  05/21/2020    ** CASE IN OR WITY GI STAFF** ERCP STENT INSERTION performed by Kemal Amezcua MD at [facility]    ERCP  05/21/2020    ** CASE IN OR WITH GI STAFF**ERCP SPHINCTER/PAPILLOTOMY performed by [facility]al [facility]zcua MD at [facility]    ERCP  05/21/2020    ** CASE IN OR WITH GI STAFF**ERCP DILATION BALLOON performed by Kemal Amezcua MD at [facility]    ERCP N/A 2/8/2021    ERCP STENT REMOVAL performed by Angela Celis MD at [facility]    ERCP  2/8/2021    ERCP STONE REMOVAL performed by Nyasia Ryan MD at [address]      patricia IOL    GASTRIC FUNDOPLICATION N/A 24/57/5964    XI LAPAROSCOPIC ROBOTIC HIATAL HERNIA REPAIR, CHERYL FUNDOPLICATION performed by Salud Lieberman MD at [facility] N/A 03/27/2020    EGD PEG TUBE PLACEMENT performed by Salud Lieberman MD at [facility] N/A 2/8/2021    **CASE IN O.R. W/ GI STAFF - EGD WITH REMOVAL PEG TUBE performed by Nyasia Ryan MD at [facility] Endoscopy    HAND SURGERY      [facility] CATH POWER PICC TRIPLE  03/04/2020         HYSTERECTOMY      Abdominal    JOINT REPLACEMENT Right     right hip    OVARY REMOVAL      RHINOPLASTY      TONSILLECTOMY      TOTAL HIP ARTHROPLASTY      TOTAL NEPHRECTOMY      atrophic from infections, age 13   Gloriajean Seeds TRACHEOSTOMY N/A 03/27/2020    **ADD ON **WANTS 10:00AM **TRACHEOTOMY performed by Salud Lieberman MD at [address] 04/02/2020    TRACHEOTOMY EXCHANGE performed by Salud Lieberman MD at [facility] N/A 03/17/2020    BEDSIDE EGD ESOPHAGOGASTRODUODENOSCOPY (ICU) performed by Salud Lieberman MD at [address] [place] [address] N/A 05/18/2020    EGD BIOPSY performed by Regino Lee MD at [address] [place] [address]  05/18/2020    EGD DILATION BALLOON performed by Regino Lee MD at [address] [place] [address] N/A 07/06/2020    ** CASE IN O.R. WITH GI STAFF** EGD ESOPHAGOGASTRODUODENOSCOPY performed by Alphonse Monge MD at [address] [place] [address] 11/09/2020    EGD DIAGNOSTIC ONLY performed by Lesly Burns MD at [address] [place] [address]  02/08/2021    - EGD WITH REMOVAL PEG TUBE,ERCP STENT REMOVAL,ERCP STONE REMOVAL       Medications:      meropenem  1,000 mg IntraVENous Once    Followed by    meropenem  1,000 mg IntraVENous Q8H  docusate sodium  100 mg Oral BID    budesonide-formoterol  2 puff Inhalation BID    amitriptyline  25 mg Oral Nightly    busPIRone  20 mg Oral TID    clonazePAM  0.25 mg Oral BID    ferrous sulfate  325 mg Oral Daily with breakfast    furosemide  20 mg Oral Daily    gabapentin  300 mg Oral TID    metoclopramide  5 mg Oral TID AC    metoprolol succinate  25 mg Oral Daily    pantoprazole  40 mg Oral BID    polyethylene glycol  17 g Oral Every Other Day    QUEtiapine  25 mg Oral BID    atorvastatin  20 mg Oral Daily    sucralfate  1 g Oral 4x Daily    traZODone  100 mg Oral Nightly    sodium chloride flush  5-40 mL IntraVENous 2 times per day    enoxaparin  40 mg SubCUTAneous Daily       Social History:     Social History     Socioeconomic History    Marital status:      Spouse name: Not on file    Number of children: 2    Years of education: Not on file    Highest education level: Not on file   Occupational History    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   Tobacco Use    Smoking status: Former Smoker     Packs/day: 1.00     Years: 50.00     Pack years: 50.00     Types: Cigarettes    Smokeless tobacco: Never Used    Tobacco comment: quit 1 year ago    Vaping Use    Vaping Use: Former    Substances: Always   Substance and Sexual Activity    Alcohol use: No    Drug use: No    Sexual activity: Never   Other Topics Concern    Not on file   Social History Narrative    Not on file     Social Determinants of Health     Financial Resource Strain:     Difficulty of Paying Living Expenses:    Food Insecurity:     Worried About [facility] in the Last Year:     [facility] in the Last Year:    Transportation Needs:     Lack of Transportation (Medical):      Lack of Transportation (Non-Medical):    Physical Activity:     Days of Exercise per Week:     Minutes of Exercise per Session:    Stress:     Feeling of Stress :    Social Connections:     Frequency of Communication with Friends and Family:     Frequency of Social Gatherings with Friends and Family:     Attends Mu-ism Services:     Active Member of Clubs or Organizations:     Attends Club or Organization Meetings:     Marital Status:    Intimate Partner Violence:     Fear of Current or Ex-Partner:     Emotionally Abused:     Physically Abused:     Sexually Abused:        Family History:     Family History   Problem Relation Age of Onset    Cancer Mother         uterine    Heart Attack Mother     Heart Attack Father     Other Maternal Grandfather         foot gangrene    Other Paternal Grandmother         bleeding ulcers    Other Paternal Grandfather         lung cancer        Allergies:   Prednisone, Compazine [prochlorperazine maleate], Penicillin v potassium, and Penicillins     Review of Systems:   Constitutional: No fevers or chills. No systemic complaints  Head: No headaches  Eyes: No double vision or blurry vision. No conjunctival inflammation. ENT: No sore throat or runny nose. . No hearing loss, tinnitus or vertigo. Cardiovascular: No chest pain or palpitations. No shortness of breath. No HOLLAND  Lung: No shortness of breath or cough. No sputum production  Abdomen: No nausea, vomiting, diarrhea, or abdominal pain. Jen Sharper No cramps. Genitourinary: No increased urinary frequency, or dysuria. No hematuria. No suprapubic or CVA pain  Musculoskeletal: No muscle aches or pains. No joint effusions, swelling or deformities  Hematologic: No bleeding or bruising. Neurologic: No headache, weakness, numbness, or tingling. Integument: No rash, no ulcers. Psychiatric: No depression. Endocrine: No polyuria, no polydipsia, no polyphagia.     Physical Examination :     Patient Vitals for the past 8 hrs:   BP Temp Temp src Pulse Resp SpO2 Weight   09/13/21 0800 (!) 162/84 99.1 °F (37.3 °C) Oral 89 18 92 %    09/13/21 0600       187 lb 6.3 oz (85 kg)     General Appearance: Awake, alert, and in no apparent distress  Head: Cfwwnp-Jephivtp-Xfars:       Medical Decision Making-Other:     Note:  · Labs, medications, radiologic studies were reviewed with personal review of films  · Large amounts of data were reviewed  · Discussed with nursing Staff, Discharge planner  · Infection Control and Prevention measures reviewed  · All prior entries were reviewed  · Administer medications as ordered  · Prognosis: Good  · Discharge planning reviewed    Thank you for allowing us to participate in the care of this patient. Please call with questions.     Jessika Hodge DPM  Pager: [phone] - Office: [phone]

## 2021-09-13 NOTE — PROGRESS NOTES
[facility]  Office: [address], DO, Pablo Kras, DO, Rukhsana Ng, DO, Tyra Erica Blood, DO, Che Royal MD, Mariam De La Torre MD, Tasha oLco MD, John Hough MD, Ede Aguilar MD, Jono Vasquez MD, Barbara Up MD, Aria Pruett, DO, Veronika Hernández, DO, Mallory Buchanan MD,  Chris Jain, DO, Cyril Limon MD, Melissa Hinton MD, Leopoldo Logan MD, Ariadna Cortez MD, , Arlene Sanchez MD, Alexandro Ware MD, Ambar Marte MD, Farooq Mcleod, [facility], [facility], CNP, Surya Diaz, CNP, Yolanda Colin, CNS, Isaiah Reynolds, CNP, Addy Section, CNP, Prince Mancia, CNP, Jessenia Cunningham, CNP, Kaylynn Solders, CNP, SERGO DeC, Terrence Frias, [facility], Zev Fernandez, CNP, Oralem Soda, CNP, Wyamyia Husbands, CNP, Mateo Brooks, CNP, Suzanne Shah, CNP, Colletta Moros, CNP, Jayleen Camp, CNP, Isabelle Adairs, [facility]    Progress Note    9/13/2021    7:47 AM    Name:   Diana Shah  MRN:     [id number]     Acct:      [de-identified]   Room:   [address] [place] [address] Day:  4  Admit Date:  9/9/2021  7:08 PM    PCP:   Jason Pemberton MD  Code Status:  Full Code    Subjective:     C/C:   Chief Complaint   Patient presents with    Fever    Fatigue    Cough      Interval History Status: not changed. Pt was seen and examined this morning   Complaints of GERD   No other complaints at this time     Brief History: This is a 76 yr old female with large hiatal hernia s/p robotic repair, depression/anxiety, HTN, HLD, MVP,GERD and recurrent and MDRO UTI who presents from her ECF with generalized malaise, subjective fevers, fatigue and nausea. She denies any CP, SOB, dysuria/hematuria or changes in urinary habits, vomiting/diarrhea HA/dizziness or light headedness. Pertinent presenting labs demonstrate elevated Lactic Acid: 2.2, mildly elevated serum creatinine and leukocytosis. UA demonstrating concerns for acute cystitis. Patient was given 1L IV fluid bolus in the ER and started on broad spectrum antibiotics. Patient is admitted to [facility] for further management of UTI. Review of Systems:     12 point ROS performed and negative for anything other than what was stated in subjective     Medications: Allergies:     Allergies   Allergen Reactions    Prednisone Anxiety    Compazine [Prochlorperazine Maleate]     Penicillin V Potassium     Penicillins Other (See Comments)     Shock- received meropenem and ceftriaxone wo issues 2020       Current Meds:   Scheduled Meds:    meropenem  1,000 mg IntraVENous Once    Followed by    [facility] meropenem  1,000 mg IntraVENous Q8H    docusate sodium  100 mg Oral BID    budesonide-formoterol  2 puff Inhalation BID    amitriptyline  25 mg Oral Nightly    busPIRone  20 mg Oral TID    clonazePAM  0.25 mg Oral BID    ferrous sulfate  325 mg Oral Daily with breakfast    furosemide  20 mg Oral Daily    gabapentin  300 mg Oral TID    metoclopramide  5 mg Oral TID AC    metoprolol succinate  25 mg Oral Daily    pantoprazole  40 mg Oral BID    polyethylene glycol  17 g Oral Every Other Day    QUEtiapine  25 mg Oral BID    atorvastatin  20 mg Oral Daily    sucralfate  1 g Oral 4x Daily    traZODone  100 mg Oral Nightly    sodium chloride flush  5-40 mL IntraVENous 2 times per day    enoxaparin  40 mg SubCUTAneous Daily     Continuous Infusions:    sodium chloride       PRN Meds: HYDROcodone 5 mg - acetaminophen **OR** HYDROcodone 5 mg - acetaminophen, bisacodyl, calcium carbonate, sodium chloride flush, sodium chloride, acetaminophen **OR** acetaminophen    Data:     Past Medical History:   has a past medical history of Anxiety, Arthritis, Back pain, Bronchitis, Caffeine use, Carpal tunnel syndrome, Cataracts, bilateral, Constipation, Depression, Diarrhea, GERD (gastroesophageal reflux disease), H/O gastric ulcer, Hyperlipidemia, Hypertension, Mumps, MVP (mitral valve prolapse), Recurrent UTI, Sinusitis, Tracheostomy in place [facility]), Ulcerative esophagitis, and Wellness examination. Social History:   reports that she has quit smoking. Her smoking use included cigarettes. She has a 50.00 pack-year smoking history. She has never used smokeless tobacco. She reports that she does not drink alcohol and does not use drugs. Family History:   Family History   Problem Relation Age of Onset   Yvon Loser Cancer Mother         uterine    Heart Attack Mother     Heart Attack Father     Other Maternal Grandfather         foot gangrene    Other Paternal Grandmother         bleeding ulcers    Other Paternal Grandfather         lung cancer       Vitals:  BP (!) 166/68   Pulse 106   Temp 97.8 °F (36.6 °C) (Oral)   Resp 17   Ht 5' 2\" (1.575 m)   Wt 187 lb 6.3 oz (85 kg)   SpO2 94%   BMI 34.27 kg/m²   Temp (24hrs), Av.6 °F (37 °C), Min:97.8 °F (36.6 °C), Max:99.3 °F (37.4 °C)    No results for input(s): POCGLU in the last 72 hours. I/O (24Hr): Intake/Output Summary (Last 24 hours) at 2021 0747  Last data filed at 2021 0617  Gross per 24 hour   Intake --   Output 2600 ml   Net -2600 ml       Labs:  Hematology:No results for input(s): WBC, RBC, HGB, HCT, MCV, MCH, MCHC, RDW, PLT, MPV, SEDRATE, CRP, INR, DDIMER, NG5LZIPD, LABABSO in the last 72 hours. Invalid input(s): PT  Chemistry:  Recent Labs     21  0502 21  0730 21  0624    139 136   K 3.9 3.8 3.3*   * 107 101   CO2 18* 21 22   GLUCOSE 96 91 102*   BUN 11 9 12   CREATININE 0.84 0.83 0.87   ANIONGAP 10 11 13   LABGLOM >60 >60 >60   GFRAA >60 >60 >60   CALCIUM 8.0* 8.6 9.0   No results for input(s): PROT, LABALBU, LABA1C, X0JPRQW, T0UWHXG, FT4, TSH, AST, ALT, LDH, GGT, ALKPHOS, LABGGT, BILITOT, BILIDIR, AMMONIA, AMYLASE, LIPASE, LACTATE, CHOL, HDL, LDLCHOLESTEROL, CHOLHDLRATIO, TRIG, VLDL, VPM27IO, PHENYTOIN, PHENYF, URICACID, POCGLU in the last 72 hours.   ABG:  Lab Results   Component Value Date POCPH 7.437 05/30/2020    POCPCO2 42.3 05/30/2020    POCPO2 129.9 05/30/2020    POCHCO3 28.6 05/30/2020    NBEA NOT REPORTED 05/30/2020    PBEA 4 05/30/2020    ONV6MRG 30 05/30/2020    IAYX8AFV 99 05/30/2020    FIO2 INFORMATION NOT PROVIDED 09/09/2021     Lab Results   Component Value Date/Time    SPECIAL NOT REPORTED 09/09/2021 09:30 PM     Lab Results   Component Value Date/Time    CULTURE (A) 09/09/2021 09:30 PM     KLEBSIELLA PNEUMONIAE >271705 CFU/ML THIS ORGANISM IS AN EXTENDED-SPECTRUM BETA-LACTAMASE  AND RESISTANCE TO THERAPY WITH PENICILLINS, CEPHALOSPORINS AND AZTREONAM IS EXPECTED. THESE ORGANISMS GENERALLY REMAIN SUSCEPTIBLE TO CARBAPENEMS. CONSIDER ID CONSULTATION. Radiology:  CT ABDOMEN PELVIS W IV CONTRAST Additional Contrast? None    Result Date: 9/9/2021  1. Persisting moderate hiatal hernia with distal esophageal moderate distension and fluid-filled lumen, possibly associated with gastroesophageal reflux. 2. Marked diffuse hepatic steatosis. 3. Hepatomegaly. 4. Bilateral lower lung lobe and left lingular mild scattered bronchiectasis with surrounding multifocal mild scattered consolidation. Findings suggest presence of pneumonia in setting of changes related to chronic infection. Recommend clinical correlation. XR CHEST PORTABLE    Result Date: 9/10/2021  Chronic pulmonary change without acute cardiopulmonary process.      XR CHEST PORTABLE    Result Date: 9/9/2021  No acute cardiopulmonary disease       Physical Examination:        General appearance:  alert, cooperative and no distress  Mental Status:  oriented to person, place and time and normal affect  Lungs:  clear to auscultation bilaterally, normal effort  Heart:  regular rate and rhythm, no murmur  Abdomen:  soft, nontender, nondistended, normal bowel sounds, no masses, hepatomegaly, splenomegaly  Extremities:  no edema, redness, tenderness in the calves  Skin:  no gross lesions, rashes, induration    Assessment: Hospital Problems         Last Modified POA    * (Principal) Sepsis ([facility] [address] 9/10/2021 Yes    GERD (gastroesophageal reflux disease) 9/10/2021 Yes    Depression 9/10/2021 Yes    Anxiety 9/10/2021 Yes    Hiatal hernia 9/10/2021 Yes    Recurrent UTI 9/10/2021 Yes    Overview Signed 5/12/2020  5:22 PM by MD Dr. Radha So         Hyperlipidemia 9/10/2021 Yes    Hypertension 9/10/2021 Yes    S/P Nissen fundoplication (without gastrostomy tube) procedure 9/10/2021 Yes          Plan:        1. Sepsis  2. UTI   - urine culture positive for ESBL/klebsiella   - blood cx ngtd   - currently afebrile   - d/c cefepime, started on meropenem  - ID consulted as patient will need outpatient IV antibiotic management   - v/s per unit protocol      3. Elevated serum creatinine:   - likely 2/1 and poor PO intake over the last several days  - resolved at this time      4.  HTN:   - controlled  - v/s per unit protocol   - continue home anti hypertensive regimen      5. Anxiety/Depression  - no acute disturbances  - resume home dose amitriptyline, buspar, Seroquel and klonopin     6. HLD  - continue on statin     7.  GERD  - continue on carafate/PPI     8. S/p Nissen fundoplication without PEG tube          Christ Lobato MD  9/13/2021  7:47 AM

## 2021-09-14 PROBLEM — A41.9 SEPSIS (HCC): Status: RESOLVED | Noted: 2021-09-09 | Resolved: 2021-09-14

## 2021-09-14 LAB
ANION GAP SERPL CALCULATED.3IONS-SCNC: 15 MMOL/L (ref 9–17)
BUN BLDV-MCNC: 11 MG/DL (ref 8–23)
BUN/CREAT BLD: NORMAL (ref 9–20)
CALCIUM SERPL-MCNC: 9.1 MG/DL (ref 8.6–10.4)
CHLORIDE BLD-SCNC: 104 MMOL/L (ref 98–107)
CO2: 22 MMOL/L (ref 20–31)
CREAT SERPL-MCNC: 0.83 MG/DL (ref 0.5–0.9)
GFR AFRICAN AMERICAN: >60 ML/MIN
GFR NON-AFRICAN AMERICAN: >60 ML/MIN
GFR SERPL CREATININE-BSD FRML MDRD: NORMAL ML/MIN/{1.73_M2}
GFR SERPL CREATININE-BSD FRML MDRD: NORMAL ML/MIN/{1.73_M2}
GLUCOSE BLD-MCNC: 98 MG/DL (ref 70–99)
POTASSIUM SERPL-SCNC: 3.7 MMOL/L (ref 3.7–5.3)
SODIUM BLD-SCNC: 141 MMOL/L (ref 135–144)

## 2021-09-14 PROCEDURE — 2580000003 HC RX 258: Performed by: INTERNAL MEDICINE

## 2021-09-14 PROCEDURE — 97530 THERAPEUTIC ACTIVITIES: CPT

## 2021-09-14 PROCEDURE — 99232 SBSQ HOSP IP/OBS MODERATE 35: CPT | Performed by: INTERNAL MEDICINE

## 2021-09-14 PROCEDURE — 6370000000 HC RX 637 (ALT 250 FOR IP): Performed by: FAMILY MEDICINE

## 2021-09-14 PROCEDURE — 2580000003 HC RX 258: Performed by: NURSE PRACTITIONER

## 2021-09-14 PROCEDURE — 99239 HOSP IP/OBS DSCHRG MGMT >30: CPT | Performed by: INTERNAL MEDICINE

## 2021-09-14 PROCEDURE — 80048 BASIC METABOLIC PNL TOTAL CA: CPT

## 2021-09-14 PROCEDURE — 6360000002 HC RX W HCPCS: Performed by: INTERNAL MEDICINE

## 2021-09-14 PROCEDURE — 94640 AIRWAY INHALATION TREATMENT: CPT

## 2021-09-14 PROCEDURE — 97162 PT EVAL MOD COMPLEX 30 MIN: CPT

## 2021-09-14 PROCEDURE — 36415 COLL VENOUS BLD VENIPUNCTURE: CPT

## 2021-09-14 PROCEDURE — 6360000002 HC RX W HCPCS: Performed by: NURSE PRACTITIONER

## 2021-09-14 PROCEDURE — 6370000000 HC RX 637 (ALT 250 FOR IP): Performed by: NURSE PRACTITIONER

## 2021-09-14 PROCEDURE — 94760 N-INVAS EAR/PLS OXIMETRY 1: CPT

## 2021-09-14 PROCEDURE — 1200000000 HC SEMI PRIVATE

## 2021-09-14 PROCEDURE — 97116 GAIT TRAINING THERAPY: CPT

## 2021-09-14 RX ORDER — LIDOCAINE HYDROCHLORIDE 10 MG/ML
5 INJECTION, SOLUTION EPIDURAL; INFILTRATION; INTRACAUDAL; PERINEURAL ONCE
Status: DISCONTINUED | OUTPATIENT
Start: 2021-09-14 | End: 2021-09-15 | Stop reason: HOSPADM

## 2021-09-14 RX ORDER — SODIUM CHLORIDE 0.9 % (FLUSH) 0.9 %
5-40 SYRINGE (ML) INJECTION EVERY 12 HOURS SCHEDULED
Status: DISCONTINUED | OUTPATIENT
Start: 2021-09-14 | End: 2021-09-15 | Stop reason: HOSPADM

## 2021-09-14 RX ORDER — SODIUM CHLORIDE 0.9 % (FLUSH) 0.9 %
5-40 SYRINGE (ML) INJECTION PRN
Status: DISCONTINUED | OUTPATIENT
Start: 2021-09-14 | End: 2021-09-15 | Stop reason: HOSPADM

## 2021-09-14 RX ORDER — SODIUM CHLORIDE 9 MG/ML
25 INJECTION, SOLUTION INTRAVENOUS PRN
Status: DISCONTINUED | OUTPATIENT
Start: 2021-09-14 | End: 2021-09-15 | Stop reason: HOSPADM

## 2021-09-14 RX ADMIN — DOCUSATE SODIUM 100 MG: 100 CAPSULE ORAL at 08:10

## 2021-09-14 RX ADMIN — SUCRALFATE 1 G: 1 TABLET ORAL at 13:24

## 2021-09-14 RX ADMIN — METOPROLOL SUCCINATE 25 MG: 25 TABLET, FILM COATED, EXTENDED RELEASE ORAL at 08:09

## 2021-09-14 RX ADMIN — PANTOPRAZOLE SODIUM 40 MG: 40 TABLET, DELAYED RELEASE ORAL at 08:09

## 2021-09-14 RX ADMIN — GABAPENTIN 300 MG: 300 CAPSULE ORAL at 14:56

## 2021-09-14 RX ADMIN — MEROPENEM 500 MG: 500 INJECTION, POWDER, FOR SOLUTION INTRAVENOUS at 17:18

## 2021-09-14 RX ADMIN — GABAPENTIN 300 MG: 300 CAPSULE ORAL at 08:09

## 2021-09-14 RX ADMIN — METOCLOPRAMIDE 5 MG: 10 TABLET ORAL at 16:10

## 2021-09-14 RX ADMIN — BUDESONIDE AND FORMOTEROL FUMARATE DIHYDRATE 2 PUFF: 160; 4.5 AEROSOL RESPIRATORY (INHALATION) at 20:57

## 2021-09-14 RX ADMIN — QUETIAPINE FUMARATE 25 MG: 25 TABLET ORAL at 20:57

## 2021-09-14 RX ADMIN — TRAZODONE HYDROCHLORIDE 100 MG: 100 TABLET ORAL at 20:56

## 2021-09-14 RX ADMIN — BUSPIRONE HYDROCHLORIDE 20 MG: 10 TABLET ORAL at 20:57

## 2021-09-14 RX ADMIN — SUCRALFATE 1 G: 1 TABLET ORAL at 17:16

## 2021-09-14 RX ADMIN — METOCLOPRAMIDE 5 MG: 10 TABLET ORAL at 12:06

## 2021-09-14 RX ADMIN — ATORVASTATIN CALCIUM 20 MG: 20 TABLET, FILM COATED ORAL at 08:10

## 2021-09-14 RX ADMIN — ENOXAPARIN SODIUM 40 MG: 40 INJECTION SUBCUTANEOUS at 08:09

## 2021-09-14 RX ADMIN — CLONAZEPAM 0.25 MG: 0.5 TABLET ORAL at 08:15

## 2021-09-14 RX ADMIN — MEROPENEM 500 MG: 500 INJECTION, POWDER, FOR SOLUTION INTRAVENOUS at 10:23

## 2021-09-14 RX ADMIN — AMITRIPTYLINE HYDROCHLORIDE 25 MG: 25 TABLET, FILM COATED ORAL at 20:56

## 2021-09-14 RX ADMIN — SUCRALFATE 1 G: 1 TABLET ORAL at 08:11

## 2021-09-14 RX ADMIN — MEROPENEM 500 MG: 500 INJECTION, POWDER, FOR SOLUTION INTRAVENOUS at 01:35

## 2021-09-14 RX ADMIN — GABAPENTIN 300 MG: 300 CAPSULE ORAL at 20:57

## 2021-09-14 RX ADMIN — BUSPIRONE HYDROCHLORIDE 20 MG: 10 TABLET ORAL at 14:56

## 2021-09-14 RX ADMIN — SUCRALFATE 1 G: 1 TABLET ORAL at 20:57

## 2021-09-14 RX ADMIN — HYDROCODONE BITARTRATE AND ACETAMINOPHEN 1 TABLET: 5; 325 TABLET ORAL at 01:35

## 2021-09-14 RX ADMIN — BUDESONIDE AND FORMOTEROL FUMARATE DIHYDRATE 2 PUFF: 160; 4.5 AEROSOL RESPIRATORY (INHALATION) at 10:18

## 2021-09-14 RX ADMIN — PANTOPRAZOLE SODIUM 40 MG: 40 TABLET, DELAYED RELEASE ORAL at 20:57

## 2021-09-14 RX ADMIN — FERROUS SULFATE TAB EC 325 MG (65 MG FE EQUIVALENT) 325 MG: 325 (65 FE) TABLET DELAYED RESPONSE at 08:10

## 2021-09-14 RX ADMIN — ANTACID TABLETS 500 MG: 500 TABLET, CHEWABLE ORAL at 17:44

## 2021-09-14 RX ADMIN — QUETIAPINE FUMARATE 25 MG: 25 TABLET ORAL at 08:09

## 2021-09-14 RX ADMIN — FUROSEMIDE 20 MG: 20 TABLET ORAL at 08:10

## 2021-09-14 RX ADMIN — METOCLOPRAMIDE 5 MG: 10 TABLET ORAL at 06:13

## 2021-09-14 RX ADMIN — BUSPIRONE HYDROCHLORIDE 20 MG: 10 TABLET ORAL at 08:10

## 2021-09-14 RX ADMIN — HYDROCODONE BITARTRATE AND ACETAMINOPHEN 2 TABLET: 5; 325 TABLET ORAL at 17:16

## 2021-09-14 RX ADMIN — HYDROCODONE BITARTRATE AND ACETAMINOPHEN 2 TABLET: 5; 325 TABLET ORAL at 08:48

## 2021-09-14 RX ADMIN — CLONAZEPAM 0.25 MG: 0.5 TABLET ORAL at 20:57

## 2021-09-14 RX ADMIN — SODIUM CHLORIDE, PRESERVATIVE FREE 10 ML: 5 INJECTION INTRAVENOUS at 08:11

## 2021-09-14 ASSESSMENT — PAIN DESCRIPTION - FREQUENCY: FREQUENCY: CONTINUOUS

## 2021-09-14 ASSESSMENT — PAIN DESCRIPTION - DESCRIPTORS: DESCRIPTORS: SHARP

## 2021-09-14 ASSESSMENT — PAIN SCALES - GENERAL
PAINLEVEL_OUTOF10: 4
PAINLEVEL_OUTOF10: 7
PAINLEVEL_OUTOF10: 6
PAINLEVEL_OUTOF10: 3
PAINLEVEL_OUTOF10: 7
PAINLEVEL_OUTOF10: 5

## 2021-09-14 ASSESSMENT — PAIN - FUNCTIONAL ASSESSMENT: PAIN_FUNCTIONAL_ASSESSMENT: ACTIVITIES ARE NOT PREVENTED

## 2021-09-14 ASSESSMENT — PAIN DESCRIPTION - PAIN TYPE: TYPE: ACUTE PAIN

## 2021-09-14 ASSESSMENT — PAIN DESCRIPTION - LOCATION: LOCATION: ABDOMEN

## 2021-09-14 NOTE — CARE COORDINATION
Transition planning    Notified per nurse line has not been placed. Rescheduled transport time - they had was midnight or 630 am - was able to set up for 830 AM on 9/15 will notify the facility .      Message left with Jeffery Lopez at [facility]

## 2021-09-14 NOTE — PROGRESS NOTES
[facility] - Progress Note  Today's Date and Time: 9/14/2021, 11:12 AM    Impression :   · Complicated Urinary tract infection with ESBL + Klebsiella    Recommendations:   · Antibiotics changed from cefepime to meropenem 0.5 g every 8 hours. Stop date 9-18-21  · Case management working on transition plan. Patient has transportation set up for this afternoon. Primary notified for placement for line per case management    Medical Decision Making/Summary/Discussion:9/14/2021     ·   Infection Control Recommendations   · [place] Precautions    Antimicrobial Stewardship Recommendations     · Simplification of therapy  · Targeted therapy    Coordination of Outpatient Care:   · Estimated Length of IV antimicrobials:  · Patient will need Midline Catheter Insertion:   · Patient will need PICC line Insertion:  · Patient will need: Home IV , [place],  SNF,  LTAC:  · Patient will need outpatient wound care:    Chief complaint/reason for consultation:   · Antibiotic management for UTI      History of Present Illness:   Kiki Farah is a 76y.o.-year-old female who was initially admitted on 9/9/2021. Patient seen at the request of Dr. Deshawn Dacosta:    Patient presented to ED on 9/9/2021 with complaints of fever, general ill feeling. She states that this has been worsening since 9/7/2021. She reports cough as well as abdominal pain. Patient lives at an extended care facility. Urine analysis was performed which was positive for UTI. Patient was started on cefepime and vancomycin. CURRENT EVALUATION: 9/13/2021    Patient was seen at bedside  Patient denies any nausea, vomiting, fever, chills or shortness of breath. Patient states that she feels better today and controlled acid reflux from yesterday. She states that she has no burning sensation when urinating and is able to urinate without any obstruction.       Labs, X rays reviewed: Ollie Castleman, MD at [address]      patricia IOL    GASTRIC FUNDOPLICATION N/A [id number]/[id number]    XI LAPAROSCOPIC ROBOTIC HIATAL HERNIA REPAIR, CHERYL FUNDOPLICATION performed by Almaz Zaragoza MD at [facility] N/A 03/27/2020    EGD PEG TUBE PLACEMENT performed by Almaz Zaragoza MD at [facility] N/A 2/8/2021    **CASE IN O.R. W/ GI STAFF - EGD WITH REMOVAL PEG TUBE performed by Ollie Castleman, MD at [facility]    HAND SURGERY      [facility] CATH POWER PICC TRIPLE  03/04/2020         HYSTERECTOMY      Abdominal    JOINT REPLACEMENT Right     right hip    OVARY REMOVAL      RHINOPLASTY      TONSILLECTOMY      TOTAL HIP ARTHROPLASTY      TOTAL NEPHRECTOMY      atrophic from infections, age 13   [employer] TRACHEOSTOMY N/A 03/27/2020    **ADD ON **WANTS 10:00AM **TRACHEOTOMY performed by Almaz Zaragoza MD at [address] [facility] 04/02/2020    TRACHEOTOMY EXCHANGE performed by Almaz Zaragoza MD at [address] [place] [address] N/A 03/17/2020    BEDSIDE EGD ESOPHAGOGASTRODUODENOSCOPY (ICU) performed by Almaz Zaragoza MD at [address] [place] [address] N/A 05/18/2020    EGD BIOPSY performed by Laina Hennessy MD at [address] [place] [address]  05/18/2020    EGD DILATION BALLOON performed by Laina Hennessy MD at [address] [place] [address] N/A 07/06/2020    ** CASE IN O.R. WITH GI STAFF** EGD ESOPHAGOGASTRODUODENOSCOPY performed by Jono Keys MD at [address] [place] [address] 11/09/2020    EGD DIAGNOSTIC ONLY performed by Caroline Linder MD at [address] [place] [address]  02/08/2021    - EGD WITH REMOVAL PEG TUBE,ERCP STENT REMOVAL,ERCP STONE REMOVAL       Medications:      lidocaine 1 % injection  5 mL IntraDERmal Once    sodium chloride flush  5-40 mL IntraVENous 2 times per day    meropenem  500 mg IntraVENous Q8H    docusate sodium  100 mg Oral BID    budesonide-formoterol  2 puff Inhalation BID    amitriptyline  25 mg Oral Nightly    busPIRone  20 mg Oral TID    clonazePAM  0.25 mg Oral BID    ferrous sulfate  325 mg Oral Daily with breakfast    furosemide  20 mg Oral Daily    gabapentin  300 mg Oral TID    metoclopramide  5 mg Oral TID AC    metoprolol succinate  25 mg Oral Daily    pantoprazole  40 mg Oral BID    polyethylene glycol  17 g Oral Every Other Day    QUEtiapine  25 mg Oral BID    atorvastatin  20 mg Oral Daily    sucralfate  1 g Oral 4x Daily    traZODone  100 mg Oral Nightly    sodium chloride flush  5-40 mL IntraVENous 2 times per day    enoxaparin  40 mg SubCUTAneous Daily       Social History:     Social History     Socioeconomic History    Marital status:      Spouse name: Not on file    Number of children: 2    Years of education: Not on file    Highest education level: Not on file   Occupational History    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   Tobacco Use    Smoking status: Former Smoker     Packs/day: 1.00     Years: 50.00     Pack years: 50.00     Types: Cigarettes    Smokeless tobacco: Never Used    Tobacco comment: quit 1 year ago    Vaping Use    Vaping Use: Former    Substances: Always   Substance and Sexual Activity    Alcohol use: No    Drug use: No    Sexual activity: Never   Other Topics Concern    Not on file   Social History Narrative    Not on file     Social Determinants of Health     Financial Resource Strain:     Difficulty of Paying Living Expenses:    Food Insecurity:     Worried About [facility] in the Last Year:     [facility] in the Last Year:    Transportation Needs:     Lack of Transportation (Medical):      Lack of Transportation (Non-Medical):    Physical Activity:     Days of Exercise per Week:     Minutes of Exercise per Session:    Stress:     Feeling of Stress :    Social Connections:  Frequency of Communication with Friends and Family:     Frequency of Social Gatherings with Friends and Family:     Attends Jewish Services:     Active Member of Clubs or Organizations:     Attends Club or Organization Meetings:     Marital Status:    Intimate Partner Violence:     Fear of Current or Ex-Partner:     Emotionally Abused:     Physically Abused:     Sexually Abused:        Family History:     Family History   Problem Relation Age of Onset    Cancer Mother         uterine    Heart Attack Mother     Heart Attack Father     Other Maternal Grandfather         foot gangrene    Other Paternal Grandmother         bleeding ulcers    Other Paternal Grandfather         lung cancer        Allergies:   Prednisone, Compazine [prochlorperazine maleate], Penicillin v potassium, and Penicillins     Review of Systems:   Constitutional: No fevers or chills. No systemic complaints  Head: No headaches  Eyes: No double vision or blurry vision. No conjunctival inflammation. ENT: No sore throat or runny nose. . No hearing loss, tinnitus or vertigo. Cardiovascular: No chest pain or palpitations. No shortness of breath. No HO[place]  Lung: No shortness of breath or cough. No sputum production  Abdomen: No nausea, vomiting, diarrhea, or abdominal pain. [place] No cramps. Genitourinary: No increased urinary frequency, or dysuria. No hematuria. No suprapubic or CVA pain  Musculoskeletal: No muscle aches or pains. No joint effusions, swelling or deformities  Hematologic: No bleeding or bruising. Neurologic: No headache, weakness, numbness, or tingling. Integument: No rash, no ulcers. Psychiatric: No depression. Endocrine: No polyuria, no polydipsia, no polyphagia.     Physical Examination :     Patient Vitals for the past 8 hrs:   BP Temp Temp src Pulse Resp SpO2 Weight   09/14/21 1021 -- -- -- -- -- 92 % --   09/14/21 0756 (!) 140/64 97.9 °F (36.6 °C) Oral 73 16 92 % --   09/14/21 0600 -- -- -- -- -- -- 181 lb 7 oz (82.3 kg)     General Appearance: Awake, alert, and in no apparent distress  Head:  Normocephalic, no trauma  Eyes: Pupils equal, round, reactive to light and accommodation; extraocular movements intact; sclera anicteric; conjunctivae pink. No embolic phenomena. ENT: Oropharynx clear, without erythema, exudate, or thrush. No tenderness of sinuses. Mouth/throat: mucosa pink and moist. No lesions. Dentition in good repair. Neck:Supple, without lymphadenopathy. Thyroid normal, No bruits. Pulmonary/Chest: Clear to auscultation, without wheezes, rales, or rhonchi. No dullness to percussion. Cardiovascular: Regular rate and rhythm without murmurs, rubs, or gallops. Abdomen: Soft, non tender. Bowel sounds normal. No organomegaly  All four Extremities: No cyanosis, clubbing, edema, or effusions. Neurologic: No gross sensory or motor deficits. Skin: Warm and dry with good turgor. No signs of peripheral arterial or venous insufficiency. No ulcerations. No open wounds. Medical Decision Making -Laboratory:   I have independently reviewed/ordered the following labs:    CBC with Differential: No results for input(s): WBC, HGB, HCT, PLT, SEGSPCT, BANDSPCT, LYMPHOPCT, MONOPCT, EOSPCT in the last 72 hours. BMP:   Recent Labs     09/13/21  0624 09/14/21  0548    141   K 3.3* 3.7    104   CO2 22 22   BUN 12 11   CREATININE 0.87 0.83     Hepatic Function Panel: No results for input(s): PROT, LABALBU, BILIDIR, IBILI, BILITOT, ALKPHOS, ALT, AST in the last 72 hours. No results for input(s): RPR in the last 72 hours. No results for input(s): HIV in the last 72 hours. No results for input(s): BC in the last 72 hours.   Lab Results   Component Value Date    MUCUS NOT REPORTED 09/09/2021    RBC 4.63 09/10/2021    TRICHOMONAS NOT REPORTED 09/09/2021    WBC 13.2 09/10/2021    YEAST NOT REPORTED 09/09/2021    TURBIDITY CLEAR 09/09/2021     Lab Results   Component Value Date    CREATININE 0.83 09/14/2021    GLUCOSE 98 mucosal thickening.       Pelvis: The urinary bladder is mildly distended and grossly unremarkable in   appearance.  No evidence of pelvic free fluid is seen.       Peritoneum/Retroperitoneum: No evidence of retroperitoneal or intraperitoneal   lymphadenopathy is identified.  No evidence of intraperitoneal free fluid is   seen.       Bones/Soft Tissues: Thoracolumbar levoscoliosis is present.  Right hip total   arthroplasty is noted without evidence of complication seen.  The bones,   skeletal muscle bundles, fascial planes and subcutaneous soft tissues are   otherwise unremarkable in appearance.           Impression   1. Persisting moderate hiatal hernia with distal esophageal moderate   distension and fluid-filled lumen, possibly associated with gastroesophageal   reflux. 2. Marked diffuse hepatic steatosis. 3. Hepatomegaly. 4. Bilateral lower lung lobe and left lingular mild scattered bronchiectasis   with surrounding multifocal mild scattered consolidation.  Findings suggest   presence of pneumonia in setting of changes related to chronic infection. Recommend clinical correlation.      XR CHEST PORTABLE  Narrative   EXAMINATION:   ONE XRAY VIEW OF THE CHEST       9/10/2021 8:02 am       COMPARISON:   Chest radiograph performed 09/09/2021.       HISTORY:   ORDERING SYSTEM PROVIDED HISTORY: sob   TECHNOLOGIST PROVIDED HISTORY:   sob       FINDINGS:   There is chronic pulmonary change.  There is no acute consolidation or   effusion.  There is no pneumothorax.  Mediastinal structures are   unremarkable.  The upper abdomen is unremarkable.  The extrathoracic soft   tissues are unremarkable.           Impression   Chronic pulmonary change without acute cardiopulmonary process.             Medical Decision [id number]:     9/12/2021  2:46 PM - Bryan, Ashish Incoming Lab Results From [facility]    Specimen Information: Urine, clean catch        Component Collected Lab   Specimen Description 09/09/2021  9:30 entries were reviewed  · Administer medications as ordered  · Prognosis: Good  · Discharge planning reviewed    Thank you for allowing us to participate in the care of this patient. Please call with questions. Neeru Fontana DPM       ATTESTATION:    I have discussed the case, including pertinent history and exam findings with the residents and students. I have seen and examined the patient and the key elements of the encounter have been performed by me. I was present when the student obtained his information or examined the patient. I have reviewed the laboratory data, other diagnostic studies and discussed them with the residents. I have updated the medical record where necessary. I agree with the assessment, plan and orders as documented by the resident/ student.     Florentino James MD.

## 2021-09-14 NOTE — DISCHARGE SUMMARY
fevers, fatigue and nausea. Patient was noted to have complicated UTI with ESBL and Klebsiella. Infectious disease was consulted. Patient was initially treated with cefepime. She has been switched over to meropenem which she should continue until 9/18/2021.       Significant therapeutic interventions: IV antibiotics    Significant Diagnostic Studies:   Labs / Micro:  CBC:   Lab Results   Component Value Date    WBC 13.2 09/10/2021    RBC 4.63 09/10/2021    HGB 12.9 09/10/2021    HCT 41.5 09/10/2021    MCV 89.6 09/10/2021    MCH 27.9 09/10/2021    MCHC 31.1 09/10/2021    RDW 16.1 09/10/2021     09/10/2021     BMP:    Lab Results   Component Value Date    GLUCOSE 98 09/14/2021     09/14/2021    K 3.7 09/14/2021     09/14/2021    CO2 22 09/14/2021    ANIONGAP 15 09/14/2021    BUN 11 09/14/2021    CREATININE 0.83 09/14/2021    BUNCRER NOT REPORTED 09/14/2021    CALCIUM 9.1 09/14/2021    LABGLOM >60 09/14/2021    GFRAA >60 09/14/2021    GFR      09/14/2021    GFR NOT REPORTED 09/14/2021     HFP:    Lab Results   Component Value Date    PROT 6.4 09/10/2021     CMP:    Lab Results   Component Value Date    GLUCOSE 98 09/14/2021     09/14/2021    K 3.7 09/14/2021     09/14/2021    CO2 22 09/14/2021    BUN 11 09/14/2021    CREATININE 0.83 09/14/2021    ANIONGAP 15 09/14/2021    ALKPHOS 132 09/10/2021    ALT 21 09/10/2021    AST 17 09/10/2021    BILITOT 0.52 09/10/2021    LABALBU 3.5 09/10/2021    ALBUMIN 1.2 09/10/2021    LABGLOM >60 09/14/2021    GFRAA >60 09/14/2021    GFR      09/14/2021    GFR NOT REPORTED 09/14/2021    PROT 6.4 09/10/2021    CALCIUM 9.1 09/14/2021     PT/INR:    Lab Results   Component Value Date    PROTIME 12.1 09/10/2021    INR 1.1 09/10/2021     PTT:   Lab Results   Component Value Date    APTT 27.4 12/13/2020     FLP:    Lab Results   Component Value Date    CHOL 203 12/14/2020    TRIG 268 12/14/2020    HDL 43 12/14/2020     U/A:    Lab Results   Component Value Date    COLORU YELLOW 09/09/2021    TURBIDITY CLEAR 09/09/2021    SPECGRAV 1.016 09/09/2021    HGBUR SMALL 09/09/2021    PHUR 7.5 09/09/2021    PROTEINU TRACE 09/09/2021    GLUCOSEU NEGATIVE 09/09/2021    KETUA NEGATIVE 09/09/2021    BILIRUBINUR NEGATIVE 09/09/2021    BILIRUBINUR Negative 06/29/2018    UROBILINOGEN Normal 09/09/2021    NITRU NEGATIVE 09/09/2021    LEUKOCYTESUR MODERATE 09/09/2021     TSH:  No results found for: TSH     Radiology:  CT ABDOMEN PELVIS W IV CONTRAST Additional Contrast? None    Result Date: 9/9/2021  1. Persisting moderate hiatal hernia with distal esophageal moderate distension and fluid-filled lumen, possibly associated with gastroesophageal reflux. 2. Marked diffuse hepatic steatosis. 3. Hepatomegaly. 4. Bilateral lower lung lobe and left lingular mild scattered bronchiectasis with surrounding multifocal mild scattered consolidation. Findings suggest presence of pneumonia in setting of changes related to chronic infection. Recommend clinical correlation. XR CHEST PORTABLE    Result Date: 9/10/2021  Chronic pulmonary change without acute cardiopulmonary process. XR CHEST PORTABLE    Result Date: 9/9/2021  No acute cardiopulmonary disease       Consultations:    Consults:     Final Specialist Recommendations/Findings:   IP CONSULT TO INTERNAL MEDICINE  IP CONSULT TO INFECTIOUS DISEASES      The patient was seen and examined on day of discharge and this discharge summary is in conjunction with any daily progress note from day of discharge. Discharge plan:     Disposition: Skilled nursing facility    Physician Follow Up: Follow-up with PCP within 1 week after discharge  No follow-up provider specified.      Requiring Further Evaluation/Follow Up POST HOSPITALIZATION/Incidental Findings: None    Diet: cardiac diet    Activity: As tolerated    Instructions to Patient: Be compliant with your diet, medications, follow-ups    Discharge Medications:      Medication List      START taking these medications    meropenem  infusion  Commonly known as: MERREM  Infuse 1,000 mg intravenously every 8 hours for 4 days Compound per protocol. CONTINUE taking these medications    amitriptyline 25 MG tablet  Commonly known as: ELAVIL  Take 1 tablet by mouth nightly     bisacodyl 10 MG suppository  Commonly known as: DULCOLAX     busPIRone 10 MG tablet  Commonly known as: BUSPAR  Take 2 tablets by mouth 3 times daily     calcium carbonate 500 MG chewable tablet  Commonly known as: TUMS     clonazePAM 0.5 MG tablet  Commonly known as: KLONOPIN  Take 0.5 tablets by mouth 2 times daily for 60 days. Taking 1/2 tablet BID     docusate 50 MG/5ML liquid  Commonly known as: COLACE  10 mLs by Per G Tube route 2 times daily     ferrous sulfate 325 (65 Fe) MG EC tablet  Commonly known as: FE TABS 325  Take 1 tablet by mouth daily (with breakfast)     fleet 7-19 GM/118ML     furosemide 20 MG tablet  Commonly known as: LASIX  Take 1 tablet by mouth daily     gabapentin 300 MG capsule  Commonly known as: NEURONTIN  Take 1 capsule by mouth 3 times daily for 30 days.      HYDROcodone-acetaminophen 5-325 MG per tablet  Commonly known as: NORCO     Lotrisone 1-0.05 % cream  Generic drug: clotrimazole-betamethasone     metoclopramide 5 MG tablet  Commonly known as: REGLAN     metoprolol succinate 25 MG extended release tablet  Commonly known as: TOPROL XL  Take 1 tablet by mouth daily     Milk of Magnesia 400 MG/5ML suspension  Generic drug: magnesium hydroxide     Oyster Shell 500 MG Tabs     pantoprazole 40 MG tablet  Commonly known as: PROTONIX  Take 1 tablet by mouth 2 times daily     polyethylene glycol 17 g packet  Commonly known as: GLYCOLAX     QUEtiapine 25 MG tablet  Commonly known as: SEROQUEL  Take 1 tablet by mouth 2 times daily     RA Vitamin D-3 50 MCG (2000 UT) Caps  Generic drug: Cholecalciferol     simvastatin 40 MG tablet  Commonly known as: ZOCOR     sucralfate 1 GM tablet  Commonly known as: CARAFATE  Take 1 tablet by mouth 4 times daily     Symbicort 160-4.5 MCG/ACT Aero  Generic drug: budesonide-formoterol     traZODone 100 MG tablet  Commonly known as: DESYREL  Take 1 tablet by mouth nightly           Where to Get Your Medications      You can get these medications from any pharmacy    Bring a paper prescription for each of these medications  · meropenem  infusion         No discharge procedures on file. Time Spent on discharge is  40 mins in patient examination, evaluation, counseling as well as medication reconciliation, prescriptions for required medications, discharge plan and follow up. Electronically signed by   Jose D Espinoza MD  9/14/2021  2:58 PM      Thank you Dr. Capo Mack MD for the opportunity to be involved in this patient's care.

## 2021-09-14 NOTE — PROGRESS NOTES
Physical Therapy    Facility/Department: [facility]  Initial Assessment    NAME: Niranjan Malone  : 1945  MRN: [id number]    Date of Service: 2021  Chief Complaint   Patient presents with    Fever    Fatigue    Cough      Discharge Recommendations:  Patient would benefit from continued therapy after discharge   PT Equipment Recommendations  Equipment Needed: No    Assessment   Body structures, Functions, Activity limitations: Decreased functional mobility ; Decreased ROM; Decreased strength;Decreased balance;Decreased endurance  Assessment: Pt with mobility deficits requiring CGA-min-A for most aspects of mobility. Pt with multiple minor LOB during ambulation this date requiring min-A to correct. Pt demonstrates good understanding of functional deficits and need for assistance. Pt is expected to be safe to return to prior living arrangements. Pt would benefit from additional therapy upon discharge. Prognosis: Good  Decision Making: Medium Complexity  PT Education: Goals;PT Role;Plan of Care;General Safety;Gait Training;Functional Mobility Training  REQUIRES PT FOLLOW UP: Yes  Activity Tolerance  Activity Tolerance: Patient limited by endurance; Patient limited by fatigue  Activity Tolerance: Limited secondary to increased nausea and dizziness. Patient Diagnosis(es): The primary encounter diagnosis was Acute cystitis without hematuria. A diagnosis of Septicemia ([facility] [address] was also pertinent to this visit. has a past medical history of Anxiety, Arthritis, Back pain, Bronchitis, Caffeine use, Carpal tunnel syndrome, Cataracts, bilateral, Constipation, Depression, Diarrhea, GERD (gastroesophageal reflux disease), H/O gastric ulcer, Hyperlipidemia, Hypertension, Mumps, MVP (mitral valve prolapse), Recurrent UTI, Sinusitis, Tracheostomy in place [facility]), Ulcerative esophagitis, and Wellness examination. has a past surgical history that includes Hysterectomy; total nephrectomy;  Tonsillectomy; Appendectomy; Cystoscopy; Ovary removal; Tubal ligation; rhinoplasty; Carpal tunnel release; Hand surgery; Total hip arthroplasty; eye surgery; Colonoscopy; joint replacement (Right); Gastric fundoplication (N/A, 58/31/4876); hc cath power picc triple (03/04/2020); Upper gastrointestinal endoscopy (N/A, 03/17/2020); tracheostomy (N/A, 03/27/2020); Gastrostomy tube placement (N/A, 03/27/2020); tracheostomy (N/A, 04/02/2020); Upper gastrointestinal endoscopy (N/A, 05/18/2020); Upper gastrointestinal endoscopy (05/18/2020); ERCP (05/21/2020); ERCP (05/21/2020); ERCP (05/21/2020); ERCP (05/21/2020); Cholecystectomy, laparoscopic (N/A, 05/22/2020); Upper gastrointestinal endoscopy (N/A, 07/06/2020); Upper gastrointestinal endoscopy (N/A, 11/09/2020); Upper gastrointestinal endoscopy (02/08/2021); Gastrostomy tube placement (N/A, 2/8/2021); ERCP (N/A, 2/8/2021); and ERCP (2/8/2021). Restrictions  Restrictions/Precautions  Restrictions/Precautions: Up as Tolerated, Contact Precautions  Position Activity Restriction  Other position/activity restrictions: up with assistance  Vision/Hearing  Vision: Impaired  Vision Exceptions: Wears glasses for reading  Hearing: Exceptions to [facility]  Hearing Exceptions: Hard of hearing/hearing concerns     Subjective  General  Patient assessed for rehabilitation services?: Yes  Response To Previous Treatment: Not applicable  Family / Caregiver Present: No  Follows Commands: Within Functional Limits  Subjective  Subjective: Pt supine in bed and agreeable to therapy this morning, RN agreeable to therapy. Pain Screening  Patient Currently in Pain: Yes  Pain Assessment  Pain Assessment: 0-10  Pain Level: 5  Pain Type: Acute pain  Pain Location: Abdomen  Pain Descriptors: Sharp  Pain Frequency: Continuous  Functional Pain Assessment: Activities are not prevented  Non-Pharmaceutical Pain Intervention(s): Ambulation/Increased Activity; Distraction;Repositioned; Emotional support  Response to Pain Intervention: Patient Satisfied  Vital Signs  Patient Currently in Pain: Yes  Pre Treatment Pain Screening  Intervention List: Patient able to continue with treatment    Orientation  Orientation  Overall Orientation Status: Within Functional Limits  Social/Functional History  Social/Functional History  Lives With: Other (comment) ([facility])  Type of Home: Facility (Pt has been at [facility] for ~ 1 year)  Home Layout: One level  Home Access: Level entry  Bathroom Shower/Tub: Walk-in shower  Bathroom Toilet: Handicap height  Bathroom Equipment: Grab bars in shower, Shower chair  Bathroom Accessibility: Jeffery Roberts: Rolling walker (Any equipment need provided by facility)  [address] Help From: Personal care attendant  ADL Assistance: Needs assistance  Bath: Minimal assistance  Dressing: Minimal assistance  Homemaking Assistance: Needs assistance  Homemaking Responsibilities: No (Facility completes all meals, laundry, and cleaning of room)  Ambulation Assistance: Needs assistance  Transfer Assistance: Needs assistance  Active : No  Patient's  Info: Pt reports son drives; [facility] service at facility for dr underwood  Mode of Transportation: Car, [employer]  Occupation: Retired  Type of occupation: [employer]  Leisure & Hobbies: Playing cards, [employer]  Additional Comments: Pt has 2 sons only one in the area, other son in [place]. Son takes pt over to sons home every Friday for a couple of hours.  Pt states was receiving restorative care 3 x week  Cognition   Cognition  Overall Cognitive Status: WFL    Objective     Observation/Palpation  Posture: Fair    AROM RLE (degrees)  RLE AROM: WFL  RLE General AROM: WFL expect dorsiflexion limited  to lacking 10 degrees from neutral  AROM LLE (degrees)  LLE AROM : WFL  LLE General AROM: WFL expect dorsiflexion limited  to lacking 10 degrees from neutral  AROM RUE (degrees)  RUE AROM : WFL  AROM LUE (degrees)  LUE AROM : WFL  Strength RLE  Strength chair, Call light within reach, Gait belt, Nurse notified  Restraints  Initially in place: No             AM-PAC Score     AM-PAC Inpatient Mobility without Stair Climbing Raw Score : 15 (09/14/21 1120)  AM-PAC Inpatient without Stair Climbing T-Scale Score : 43.03 (09/14/21 1120)  Mobility Inpatient CMS 0-100% Score: 47.43 (09/14/21 1120)  Mobility Inpatient without Stair CMS G-Code Modifier : CK (09/14/21 1120)       Goals  Short term goals  Time Frame for Short term goals: 14 visits  Short term goal 1: Pt will ambulate 150 feet with least restrictive device and supervision to return to prior level of functioning. Short term goal 2: Pt will tolerate a 35 minute therapy session to promote increased endurance. Short term goal 3: Pt will perform bed mobility with supervision to increase functional independence. Short term goal 4: Pt will perform sit<>stand, stand<>sit transfers with supervision to increase functional independence. Short term goal 5: Pt will demonstrate good- standing balance to decrease fall risk.        Therapy Time   Individual Concurrent Group Co-treatment   Time In 1015         Time Out 1050         Minutes 35         Timed Code Treatment Minutes: 25 Minutes       Grey Santo, PT

## 2021-09-14 NOTE — CARE COORDINATION
Transition planning   Spoke with Ivana Howard from [facility] - will   Set up transport for this afternoon . Will need a midline/PICC for abx that will continue till sat.  notified per unit manager for discharge order need and placement for line.  Nursing report number is [phone] ask for [place] (nurse caring for pt) - request sent for transport for 4 pm to [facility]   11- notified Unit manager/ Admission s at [facility] of time of transport

## 2021-09-15 VITALS
OXYGEN SATURATION: 93 % | HEIGHT: 62 IN | RESPIRATION RATE: 16 BRPM | HEART RATE: 69 BPM | DIASTOLIC BLOOD PRESSURE: 57 MMHG | WEIGHT: 181.44 LBS | TEMPERATURE: 97.7 F | SYSTOLIC BLOOD PRESSURE: 96 MMHG | BODY MASS INDEX: 33.39 KG/M2

## 2021-09-15 LAB
ANION GAP SERPL CALCULATED.3IONS-SCNC: 12 MMOL/L (ref 9–17)
BUN BLDV-MCNC: 14 MG/DL (ref 8–23)
BUN/CREAT BLD: NORMAL (ref 9–20)
CALCIUM SERPL-MCNC: 9.2 MG/DL (ref 8.6–10.4)
CHLORIDE BLD-SCNC: 103 MMOL/L (ref 98–107)
CO2: 23 MMOL/L (ref 20–31)
CREAT SERPL-MCNC: 0.89 MG/DL (ref 0.5–0.9)
CULTURE: NORMAL
GFR AFRICAN AMERICAN: >60 ML/MIN
GFR NON-AFRICAN AMERICAN: >60 ML/MIN
GFR SERPL CREATININE-BSD FRML MDRD: NORMAL ML/MIN/{1.73_M2}
GFR SERPL CREATININE-BSD FRML MDRD: NORMAL ML/MIN/{1.73_M2}
GLUCOSE BLD-MCNC: 90 MG/DL (ref 70–99)
Lab: NORMAL
POTASSIUM SERPL-SCNC: 4 MMOL/L (ref 3.7–5.3)
SODIUM BLD-SCNC: 138 MMOL/L (ref 135–144)
SPECIMEN DESCRIPTION: NORMAL

## 2021-09-15 PROCEDURE — 6370000000 HC RX 637 (ALT 250 FOR IP): Performed by: FAMILY MEDICINE

## 2021-09-15 PROCEDURE — C1751 CATH, INF, PER/CENT/MIDLINE: HCPCS

## 2021-09-15 PROCEDURE — 6360000002 HC RX W HCPCS: Performed by: NURSE PRACTITIONER

## 2021-09-15 PROCEDURE — 76937 US GUIDE VASCULAR ACCESS: CPT

## 2021-09-15 PROCEDURE — 2580000003 HC RX 258: Performed by: INTERNAL MEDICINE

## 2021-09-15 PROCEDURE — 94640 AIRWAY INHALATION TREATMENT: CPT

## 2021-09-15 PROCEDURE — 6370000000 HC RX 637 (ALT 250 FOR IP): Performed by: NURSE PRACTITIONER

## 2021-09-15 PROCEDURE — 6360000002 HC RX W HCPCS: Performed by: INTERNAL MEDICINE

## 2021-09-15 PROCEDURE — 36415 COLL VENOUS BLD VENIPUNCTURE: CPT

## 2021-09-15 PROCEDURE — 05HF33Z INSERTION OF INFUSION DEVICE INTO LEFT CEPHALIC VEIN, PERCUTANEOUS APPROACH: ICD-10-PCS | Performed by: INTERNAL MEDICINE

## 2021-09-15 PROCEDURE — 80048 BASIC METABOLIC PNL TOTAL CA: CPT

## 2021-09-15 RX ORDER — HYDROCODONE BITARTRATE AND ACETAMINOPHEN 5; 325 MG/1; MG/1
1 TABLET ORAL EVERY 6 HOURS PRN
Qty: 12 TABLET | Refills: 0 | Status: SHIPPED | OUTPATIENT
Start: 2021-09-15 | End: 2021-09-18

## 2021-09-15 RX ADMIN — QUETIAPINE FUMARATE 25 MG: 25 TABLET ORAL at 08:16

## 2021-09-15 RX ADMIN — CLONAZEPAM 0.25 MG: 0.5 TABLET ORAL at 08:15

## 2021-09-15 RX ADMIN — DOCUSATE SODIUM 100 MG: 100 CAPSULE ORAL at 08:16

## 2021-09-15 RX ADMIN — PANTOPRAZOLE SODIUM 40 MG: 40 TABLET, DELAYED RELEASE ORAL at 08:16

## 2021-09-15 RX ADMIN — ATORVASTATIN CALCIUM 20 MG: 20 TABLET, FILM COATED ORAL at 08:16

## 2021-09-15 RX ADMIN — FERROUS SULFATE TAB EC 325 MG (65 MG FE EQUIVALENT) 325 MG: 325 (65 FE) TABLET DELAYED RESPONSE at 08:16

## 2021-09-15 RX ADMIN — ENOXAPARIN SODIUM 40 MG: 40 INJECTION SUBCUTANEOUS at 08:16

## 2021-09-15 RX ADMIN — SODIUM CHLORIDE, PRESERVATIVE FREE 10 ML: 5 INJECTION INTRAVENOUS at 08:16

## 2021-09-15 RX ADMIN — MEROPENEM 500 MG: 500 INJECTION, POWDER, FOR SOLUTION INTRAVENOUS at 10:03

## 2021-09-15 RX ADMIN — HYDROCODONE BITARTRATE AND ACETAMINOPHEN 1 TABLET: 5; 325 TABLET ORAL at 06:17

## 2021-09-15 RX ADMIN — FUROSEMIDE 20 MG: 20 TABLET ORAL at 08:16

## 2021-09-15 RX ADMIN — SUCRALFATE 1 G: 1 TABLET ORAL at 08:17

## 2021-09-15 RX ADMIN — METOCLOPRAMIDE 5 MG: 10 TABLET ORAL at 06:18

## 2021-09-15 RX ADMIN — HYDROCODONE BITARTRATE AND ACETAMINOPHEN 1 TABLET: 5; 325 TABLET ORAL at 00:30

## 2021-09-15 RX ADMIN — BUDESONIDE AND FORMOTEROL FUMARATE DIHYDRATE 2 PUFF: 160; 4.5 AEROSOL RESPIRATORY (INHALATION) at 09:13

## 2021-09-15 RX ADMIN — METOPROLOL SUCCINATE 25 MG: 25 TABLET, FILM COATED, EXTENDED RELEASE ORAL at 08:18

## 2021-09-15 RX ADMIN — BUSPIRONE HYDROCHLORIDE 20 MG: 10 TABLET ORAL at 08:16

## 2021-09-15 RX ADMIN — GABAPENTIN 300 MG: 300 CAPSULE ORAL at 08:16

## 2021-09-15 ASSESSMENT — PAIN SCALES - GENERAL
PAINLEVEL_OUTOF10: 3
PAINLEVEL_OUTOF10: 5
PAINLEVEL_OUTOF10: 6

## 2021-09-15 NOTE — PLAN OF CARE
Problem: Skin Integrity:  Goal: Will show no infection signs and symptoms  Description: Will show no infection signs and symptoms  9/15/2021 0454 by Bong Valerio RN  Outcome: Ongoing  9/14/2021 1823 by Rachel Pallas, RN  Outcome: Ongoing  Goal: Absence of new skin breakdown  Description: Absence of new skin breakdown  9/15/2021 0454 by Bong Valerio RN  Outcome: Ongoing  9/14/2021 1823 by Rachel Pallas, RN  Outcome: Ongoing     Problem: Falls - Risk of:  Goal: Will remain free from falls  Description: Will remain free from falls  9/15/2021 0454 by Bong Valerio RN  Outcome: Ongoing  9/14/2021 1823 by Rachel Pallas, RN  Outcome: Ongoing  Goal: Absence of physical injury  Description: Absence of physical injury  9/15/2021 0454 by Bong Valerio RN  Outcome: Ongoing  9/14/2021 1823 by Rachel Pallas, RN  Outcome: Ongoing     Problem: Pain:  Goal: Pain level will decrease  Description: Pain level will decrease  9/15/2021 0454 by Bong Valerio RN  Outcome: Ongoing  9/14/2021 1823 by Rachel Pallas, RN  Outcome: Ongoing  Goal: Control of acute pain  Description: Control of acute pain  9/15/2021 0454 by Bong Valerio RN  Outcome: Ongoing  9/14/2021 1823 by Rachel Pallas, RN  Outcome: Ongoing  Goal: Control of chronic pain  Description: Control of chronic pain  9/15/2021 0454 by Bong Valerio RN  Outcome: Ongoing  9/14/2021 1823 by Rachel Pallas, RN  Outcome: Ongoing     Problem: Coping:  Goal: Expressions of feelings of enhanced comfort will increase  Description: Expressions of feelings of enhanced comfort will increase  9/15/2021 0454 by Bong Valerio RN  Outcome: Ongoing  9/14/2021 1823 by Rachel Pallas, RN  Outcome: Ongoing     Problem: Urinary Elimination:  Goal: Ability to achieve a balanced intake and output will improve  Description: Ability to achieve a balanced intake and output will improve  9/15/2021 0454 by Bong Valerio RN  Outcome: Ongoing  9/14/2021 1823 by Rachel Pallas, RN  Outcome: Ongoing  Goal: Ability to recognize the need to void and respond appropriately will improve  Description: Ability to recognize the need to void and respond appropriately will improve  9/15/2021 0454 by Devorah Lombardo RN  Outcome: Ongoing  9/14/2021 1823 by Hermila Schultz RN  Outcome: Ongoing     Problem: Discharge Planning:  Goal: Discharged to appropriate level of care  Description: Discharged to appropriate level of care  9/15/2021 0454 by Devorah Lombardo RN  Outcome: Ongoing  9/14/2021 1823 by Hermila Schultz RN  Outcome: Ongoing     Problem: Gas Exchange - Impaired:  Goal: Levels of oxygenation will improve  Description: Levels of oxygenation will improve  9/15/2021 0454 by Devorah Lombardo RN  Outcome: Ongoing  9/14/2021 1823 by Hermila Schultz RN  Outcome: Ongoing     Problem: Infection, Septic Shock:  Goal: Will show no infection signs and symptoms  Description: Will show no infection signs and symptoms  9/15/2021 0454 by Devorah Lombardo RN  Outcome: Ongoing  9/14/2021 1823 by Hermila Schultz RN  Outcome: Ongoing     Problem: Tissue Perfusion, Altered:  Goal: Circulatory function within specified parameters  Description: Circulatory function within specified parameters  9/15/2021 0454 by Devorah Lombardo RN  Outcome: Ongoing  9/14/2021 1823 by Hermila Schultz RN  Outcome: Ongoing

## 2021-09-15 NOTE — CARE COORDINATION
Discharge 1 [facility] [place] Case Management Department  Written by: Magdalena Hamilton RN    Patient Name: Vito Ocampo  Attending Provider: Berna Ashley MD  Admit Date: 2021  7:08 PM  MRN: [id number]  Account: [de-identified]                     : 1945  Discharge Date: 09-      Disposition: [facility] in [place].  Transportation by [place] [id number] Isaac Larson RN

## 2021-09-15 NOTE — PROCEDURES
4 FR Bard midline placed in Left Cephalic vein under sterile precautions. Line was trimmed at 12 cm marking, with 1 cm left on skin for line and dressing manipulation. CHG Tegaderm dressing placed over site. Line draws and flushes with ease. Thank you for the consult.

## 2021-09-15 NOTE — CARE COORDINATION
Transitional planning. Informed by pt's RN that they are placing PICC line now. Called T[place]ar with [address], changed transport time to 9:30am.   Spoke to [place] with [facility] admissions. No COVID test needed, pt had one on the 9th. Informed her of transport time    80 faxed AVS/ZARA to [phone].     0900 Karyn Messenger with the PICC time informed CM that he is having trouble with PICC placement. He asked that transport time     [address] [facility] with  [address] confirmed transport time for 11a. Informed pt's RN and Eros iHll with [facility]. Also  for admissions to inform of 11am transport.      [phone] faxed updated AVS/ZARA with IV access information to [phone]

## 2021-11-23 ENCOUNTER — APPOINTMENT (OUTPATIENT)
Dept: GENERAL RADIOLOGY | Age: 76
DRG: 177 | End: 2021-11-23
Payer: MEDICARE

## 2021-11-23 ENCOUNTER — APPOINTMENT (OUTPATIENT)
Dept: CT IMAGING | Age: 76
DRG: 177 | End: 2021-11-23
Payer: MEDICARE

## 2021-11-23 ENCOUNTER — HOSPITAL ENCOUNTER (INPATIENT)
Age: 76
LOS: 7 days | Discharge: NURSING, REHAB OR LONG-TERM CARE | DRG: 177 | End: 2021-11-30
Attending: EMERGENCY MEDICINE | Admitting: FAMILY MEDICINE
Payer: MEDICARE

## 2021-11-23 DIAGNOSIS — R41.82 ALTERED MENTAL STATUS, UNSPECIFIED ALTERED MENTAL STATUS TYPE: ICD-10-CM

## 2021-11-23 DIAGNOSIS — R10.30 LOWER ABDOMINAL PAIN: Primary | ICD-10-CM

## 2021-11-23 DIAGNOSIS — R47.01 EXPRESSIVE APHASIA: ICD-10-CM

## 2021-11-23 LAB
% CKMB: 3.7 % (ref 0–3)
-: NORMAL
ABSOLUTE EOS #: <0.03 K/UL (ref 0–0.44)
ABSOLUTE IMMATURE GRANULOCYTE: 0.06 K/UL (ref 0–0.3)
ABSOLUTE LYMPH #: 0.72 K/UL (ref 1.1–3.7)
ABSOLUTE MONO #: 0.8 K/UL (ref 0.1–1.2)
ALLEN TEST: ABNORMAL
AMORPHOUS: NORMAL
ANION GAP SERPL CALCULATED.3IONS-SCNC: 14 MMOL/L (ref 9–17)
ANION GAP: 9 MMOL/L (ref 7–16)
BACTERIA: NORMAL
BASOPHILS # BLD: 0 % (ref 0–2)
BASOPHILS ABSOLUTE: <0.03 K/UL (ref 0–0.2)
BILIRUBIN URINE: NEGATIVE
BUN BLDV-MCNC: 17 MG/DL (ref 8–23)
BUN/CREAT BLD: ABNORMAL (ref 9–20)
CALCIUM SERPL-MCNC: 9 MG/DL (ref 8.6–10.4)
CASTS UA: NORMAL /LPF (ref 0–8)
CHLORIDE BLD-SCNC: 98 MMOL/L (ref 98–107)
CK MB: 1.4 NG/ML
CKMB INTERPRETATION: ABNORMAL
CO2: 20 MMOL/L (ref 20–31)
COLOR: YELLOW
COMMENT UA: ABNORMAL
CREAT SERPL-MCNC: 1.18 MG/DL (ref 0.5–0.9)
CRYSTALS, UA: NORMAL /HPF
DIFFERENTIAL TYPE: ABNORMAL
EOSINOPHILS RELATIVE PERCENT: 0 % (ref 1–4)
EPITHELIAL CELLS UA: NORMAL /HPF (ref 0–5)
FIO2: ABNORMAL
GFR AFRICAN AMERICAN: 54 ML/MIN
GFR NON-AFRICAN AMERICAN: 35 ML/MIN
GFR NON-AFRICAN AMERICAN: 45 ML/MIN
GFR SERPL CREATININE-BSD FRML MDRD: 43 ML/MIN
GFR SERPL CREATININE-BSD FRML MDRD: ABNORMAL ML/MIN/{1.73_M2}
GLUCOSE BLD-MCNC: 126 MG/DL (ref 74–100)
GLUCOSE BLD-MCNC: 132 MG/DL (ref 70–99)
GLUCOSE URINE: NEGATIVE
HCO3 VENOUS: 25.5 MMOL/L (ref 22–29)
HCT VFR BLD CALC: 42.2 % (ref 36.3–47.1)
HEMOGLOBIN: 13.4 G/DL (ref 11.9–15.1)
IMMATURE GRANULOCYTES: 1 %
INR BLD: 1
KETONES, URINE: NEGATIVE
LACTIC ACID, SEPSIS WHOLE BLOOD: 2.3 MMOL/L (ref 0.5–1.9)
LACTIC ACID, SEPSIS: ABNORMAL MMOL/L (ref 0.5–1.9)
LEUKOCYTE ESTERASE, URINE: NEGATIVE
LYMPHOCYTES # BLD: 8 % (ref 24–43)
MCH RBC QN AUTO: 27.8 PG (ref 25.2–33.5)
MCHC RBC AUTO-ENTMCNC: 31.8 G/DL (ref 28.4–34.8)
MCV RBC AUTO: 87.6 FL (ref 82.6–102.9)
MODE: ABNORMAL
MONOCYTES # BLD: 9 % (ref 3–12)
MUCUS: NORMAL
MYOGLOBIN: 52 NG/ML (ref 25–58)
NEGATIVE BASE EXCESS, VEN: ABNORMAL (ref 0–2)
NITRITE, URINE: NEGATIVE
NRBC AUTOMATED: 0 PER 100 WBC
O2 DEVICE/FLOW/%: ABNORMAL
O2 SAT, VEN: 55 % (ref 60–85)
OTHER OBSERVATIONS UA: NORMAL
PARTIAL THROMBOPLASTIN TIME: 27.2 SEC (ref 20.5–30.5)
PATIENT TEMP: ABNORMAL
PCO2, VEN: 38.6 MM HG (ref 41–51)
PDW BLD-RTO: 16.4 % (ref 11.8–14.4)
PH UA: 7.5 (ref 5–8)
PH VENOUS: 7.43 (ref 7.32–7.43)
PLATELET # BLD: 189 K/UL (ref 138–453)
PLATELET ESTIMATE: ABNORMAL
PMV BLD AUTO: 11.2 FL (ref 8.1–13.5)
PO2, VEN: 28 MM HG (ref 30–50)
POC BUN: 18 MG/DL (ref 8–26)
POC CHLORIDE: 101 MMOL/L (ref 98–107)
POC CREATININE: 1.44 MG/DL (ref 0.51–1.19)
POC HEMATOCRIT: 41 % (ref 36–46)
POC HEMOGLOBIN: 13.8 G/DL (ref 12–16)
POC IONIZED CALCIUM: 1.18 MMOL/L (ref 1.15–1.33)
POC LACTIC ACID: 1.51 MMOL/L (ref 0.56–1.39)
POC PCO2 TEMP: ABNORMAL MM HG
POC PH TEMP: ABNORMAL
POC PO2 TEMP: ABNORMAL MM HG
POC POTASSIUM: 4.4 MMOL/L (ref 3.5–4.5)
POC SODIUM: 135 MMOL/L (ref 138–146)
POC TCO2: 26 MMOL/L (ref 22–30)
POSITIVE BASE EXCESS, VEN: 1 (ref 0–3)
POTASSIUM SERPL-SCNC: 4.2 MMOL/L (ref 3.7–5.3)
PROTEIN UA: NEGATIVE
PROTHROMBIN TIME: 11 SEC (ref 9.1–12.3)
RBC # BLD: 4.82 M/UL (ref 3.95–5.11)
RBC # BLD: ABNORMAL 10*6/UL
RBC UA: NORMAL /HPF (ref 0–4)
RENAL EPITHELIAL, UA: NORMAL /HPF
SAMPLE SITE: ABNORMAL
SARS-COV-2, RAPID: NOT DETECTED
SEG NEUTROPHILS: 82 % (ref 36–65)
SEGMENTED NEUTROPHILS ABSOLUTE COUNT: 7.24 K/UL (ref 1.5–8.1)
SODIUM BLD-SCNC: 132 MMOL/L (ref 135–144)
SPECIFIC GRAVITY UA: 1.05 (ref 1–1.03)
SPECIMEN DESCRIPTION: NORMAL
TOTAL CK: 38 U/L (ref 26–192)
TOTAL CO2, VENOUS: ABNORMAL MMOL/L (ref 23–30)
TRICHOMONAS: NORMAL
TROPONIN INTERP: ABNORMAL
TROPONIN T: ABNORMAL NG/ML
TROPONIN, HIGH SENSITIVITY: 29 NG/L (ref 0–14)
TURBIDITY: CLEAR
URINE HGB: ABNORMAL
UROBILINOGEN, URINE: NORMAL
WBC # BLD: 8.9 K/UL (ref 3.5–11.3)
WBC # BLD: ABNORMAL 10*3/UL
WBC UA: NORMAL /HPF (ref 0–5)
YEAST: NORMAL

## 2021-11-23 PROCEDURE — 85014 HEMATOCRIT: CPT

## 2021-11-23 PROCEDURE — 85025 COMPLETE CBC W/AUTO DIFF WBC: CPT

## 2021-11-23 PROCEDURE — 2580000003 HC RX 258: Performed by: STUDENT IN AN ORGANIZED HEALTH CARE EDUCATION/TRAINING PROGRAM

## 2021-11-23 PROCEDURE — 6360000004 HC RX CONTRAST MEDICATION: Performed by: STUDENT IN AN ORGANIZED HEALTH CARE EDUCATION/TRAINING PROGRAM

## 2021-11-23 PROCEDURE — 80051 ELECTROLYTE PANEL: CPT

## 2021-11-23 PROCEDURE — 71045 X-RAY EXAM CHEST 1 VIEW: CPT

## 2021-11-23 PROCEDURE — 82550 ASSAY OF CK (CPK): CPT

## 2021-11-23 PROCEDURE — 2060000000 HC ICU INTERMEDIATE R&B

## 2021-11-23 PROCEDURE — 99222 1ST HOSP IP/OBS MODERATE 55: CPT | Performed by: PSYCHIATRY & NEUROLOGY

## 2021-11-23 PROCEDURE — 80048 BASIC METABOLIC PNL TOTAL CA: CPT

## 2021-11-23 PROCEDURE — 81001 URINALYSIS AUTO W/SCOPE: CPT

## 2021-11-23 PROCEDURE — 87086 URINE CULTURE/COLONY COUNT: CPT

## 2021-11-23 PROCEDURE — 85610 PROTHROMBIN TIME: CPT

## 2021-11-23 PROCEDURE — 82803 BLOOD GASES ANY COMBINATION: CPT

## 2021-11-23 PROCEDURE — 82565 ASSAY OF CREATININE: CPT

## 2021-11-23 PROCEDURE — 99285 EMERGENCY DEPT VISIT HI MDM: CPT

## 2021-11-23 PROCEDURE — 87040 BLOOD CULTURE FOR BACTERIA: CPT

## 2021-11-23 PROCEDURE — 6360000002 HC RX W HCPCS: Performed by: STUDENT IN AN ORGANIZED HEALTH CARE EDUCATION/TRAINING PROGRAM

## 2021-11-23 PROCEDURE — 70450 CT HEAD/BRAIN W/O DYE: CPT

## 2021-11-23 PROCEDURE — 83605 ASSAY OF LACTIC ACID: CPT

## 2021-11-23 PROCEDURE — 84484 ASSAY OF TROPONIN QUANT: CPT

## 2021-11-23 PROCEDURE — 96361 HYDRATE IV INFUSION ADD-ON: CPT

## 2021-11-23 PROCEDURE — 96374 THER/PROPH/DIAG INJ IV PUSH: CPT

## 2021-11-23 PROCEDURE — 83874 ASSAY OF MYOGLOBIN: CPT

## 2021-11-23 PROCEDURE — 70498 CT ANGIOGRAPHY NECK: CPT

## 2021-11-23 PROCEDURE — 74177 CT ABD & PELVIS W/CONTRAST: CPT

## 2021-11-23 PROCEDURE — 85730 THROMBOPLASTIN TIME PARTIAL: CPT

## 2021-11-23 PROCEDURE — 6370000000 HC RX 637 (ALT 250 FOR IP): Performed by: STUDENT IN AN ORGANIZED HEALTH CARE EDUCATION/TRAINING PROGRAM

## 2021-11-23 PROCEDURE — 82330 ASSAY OF CALCIUM: CPT

## 2021-11-23 PROCEDURE — 82553 CREATINE MB FRACTION: CPT

## 2021-11-23 PROCEDURE — 82947 ASSAY GLUCOSE BLOOD QUANT: CPT

## 2021-11-23 PROCEDURE — 87635 SARS-COV-2 COVID-19 AMP PRB: CPT

## 2021-11-23 PROCEDURE — 84520 ASSAY OF UREA NITROGEN: CPT

## 2021-11-23 PROCEDURE — 93005 ELECTROCARDIOGRAM TRACING: CPT | Performed by: STUDENT IN AN ORGANIZED HEALTH CARE EDUCATION/TRAINING PROGRAM

## 2021-11-23 RX ORDER — MORPHINE SULFATE 4 MG/ML
4 INJECTION, SOLUTION INTRAMUSCULAR; INTRAVENOUS ONCE
Status: COMPLETED | OUTPATIENT
Start: 2021-11-23 | End: 2021-11-23

## 2021-11-23 RX ORDER — LIDOCAINE HYDROCHLORIDE 20 MG/ML
JELLY TOPICAL ONCE
Status: CANCELLED | OUTPATIENT
Start: 2021-11-23 | End: 2021-11-23

## 2021-11-23 RX ORDER — 0.9 % SODIUM CHLORIDE 0.9 %
1000 INTRAVENOUS SOLUTION INTRAVENOUS ONCE
Status: COMPLETED | OUTPATIENT
Start: 2021-11-23 | End: 2021-11-23

## 2021-11-23 RX ORDER — OXYMETAZOLINE HYDROCHLORIDE 0.05 G/100ML
2 SPRAY NASAL ONCE
Status: CANCELLED | OUTPATIENT
Start: 2021-11-23 | End: 2021-11-23

## 2021-11-23 RX ORDER — ACETAMINOPHEN 500 MG
1000 TABLET ORAL ONCE
Status: COMPLETED | OUTPATIENT
Start: 2021-11-23 | End: 2021-11-23

## 2021-11-23 RX ADMIN — SODIUM CHLORIDE 1000 ML: 9 INJECTION, SOLUTION INTRAVENOUS at 21:29

## 2021-11-23 RX ADMIN — ACETAMINOPHEN 1000 MG: 500 TABLET ORAL at 21:29

## 2021-11-23 RX ADMIN — MEROPENEM 1000 MG: 1 INJECTION, POWDER, FOR SOLUTION INTRAVENOUS at 23:59

## 2021-11-23 RX ADMIN — IOPAMIDOL 150 ML: 755 INJECTION, SOLUTION INTRAVENOUS at 21:16

## 2021-11-23 RX ADMIN — MORPHINE SULFATE 4 MG: 4 INJECTION INTRAVENOUS at 23:04

## 2021-11-23 ASSESSMENT — ENCOUNTER SYMPTOMS
SHORTNESS OF BREATH: 0
BACK PAIN: 0
ABDOMINAL PAIN: 1
NAUSEA: 0
ABDOMINAL DISTENTION: 1
VOMITING: 0

## 2021-11-23 ASSESSMENT — PAIN SCALES - GENERAL
PAINLEVEL_OUTOF10: 8
PAINLEVEL_OUTOF10: 7

## 2021-11-24 ENCOUNTER — APPOINTMENT (OUTPATIENT)
Dept: MRI IMAGING | Age: 76
DRG: 177 | End: 2021-11-24
Payer: MEDICARE

## 2021-11-24 ENCOUNTER — APPOINTMENT (OUTPATIENT)
Dept: CT IMAGING | Age: 76
DRG: 177 | End: 2021-11-24
Payer: MEDICARE

## 2021-11-24 ENCOUNTER — ANESTHESIA EVENT (OUTPATIENT)
Dept: ENDOSCOPY | Age: 76
DRG: 177 | End: 2021-11-24
Payer: MEDICARE

## 2021-11-24 ENCOUNTER — ANESTHESIA (OUTPATIENT)
Dept: ENDOSCOPY | Age: 76
DRG: 177 | End: 2021-11-24
Payer: MEDICARE

## 2021-11-24 ENCOUNTER — APPOINTMENT (OUTPATIENT)
Dept: GENERAL RADIOLOGY | Age: 76
DRG: 177 | End: 2021-11-24
Payer: MEDICARE

## 2021-11-24 VITALS
OXYGEN SATURATION: 95 % | RESPIRATION RATE: 22 BRPM | DIASTOLIC BLOOD PRESSURE: 49 MMHG | SYSTOLIC BLOOD PRESSURE: 91 MMHG

## 2021-11-24 LAB
ALBUMIN SERPL-MCNC: 3.4 G/DL (ref 3.5–5.2)
ALBUMIN/GLOBULIN RATIO: 1.3 (ref 1–2.5)
ALP BLD-CCNC: 118 U/L (ref 35–104)
ALT SERPL-CCNC: 15 U/L (ref 5–33)
ANION GAP SERPL CALCULATED.3IONS-SCNC: 10 MMOL/L (ref 9–17)
AST SERPL-CCNC: 15 U/L
BILIRUB SERPL-MCNC: 0.39 MG/DL (ref 0.3–1.2)
BUN BLDV-MCNC: 13 MG/DL (ref 8–23)
BUN/CREAT BLD: ABNORMAL (ref 9–20)
CALCIUM SERPL-MCNC: 8.8 MG/DL (ref 8.6–10.4)
CHLORIDE BLD-SCNC: 102 MMOL/L (ref 98–107)
CO2: 24 MMOL/L (ref 20–31)
CREAT SERPL-MCNC: 1.21 MG/DL (ref 0.5–0.9)
CULTURE: NO GROWTH
GFR AFRICAN AMERICAN: 52 ML/MIN
GFR NON-AFRICAN AMERICAN: 43 ML/MIN
GFR SERPL CREATININE-BSD FRML MDRD: ABNORMAL ML/MIN/{1.73_M2}
GFR SERPL CREATININE-BSD FRML MDRD: ABNORMAL ML/MIN/{1.73_M2}
GLUCOSE BLD-MCNC: 103 MG/DL (ref 70–99)
HCT VFR BLD CALC: 38.1 % (ref 36.3–47.1)
HEMOGLOBIN: 12.3 G/DL (ref 11.9–15.1)
INR BLD: 1.1
LACTIC ACID, SEPSIS WHOLE BLOOD: 0.9 MMOL/L (ref 0.5–1.9)
LACTIC ACID, SEPSIS: NORMAL MMOL/L (ref 0.5–1.9)
Lab: NORMAL
MCH RBC QN AUTO: 28.6 PG (ref 25.2–33.5)
MCHC RBC AUTO-ENTMCNC: 32.3 G/DL (ref 28.4–34.8)
MCV RBC AUTO: 88.6 FL (ref 82.6–102.9)
NRBC AUTOMATED: 0 PER 100 WBC
PDW BLD-RTO: 16.8 % (ref 11.8–14.4)
PLATELET # BLD: 155 K/UL (ref 138–453)
PMV BLD AUTO: 10.8 FL (ref 8.1–13.5)
POTASSIUM SERPL-SCNC: 3.9 MMOL/L (ref 3.7–5.3)
PROTHROMBIN TIME: 11.5 SEC (ref 9.1–12.3)
RBC # BLD: 4.3 M/UL (ref 3.95–5.11)
SODIUM BLD-SCNC: 136 MMOL/L (ref 135–144)
SPECIMEN DESCRIPTION: NORMAL
TOTAL PROTEIN: 6.1 G/DL (ref 6.4–8.3)
TROPONIN INTERP: ABNORMAL
TROPONIN T: ABNORMAL NG/ML
TROPONIN, HIGH SENSITIVITY: 57 NG/L (ref 0–14)
WBC # BLD: 7.9 K/UL (ref 3.5–11.3)

## 2021-11-24 PROCEDURE — 99223 1ST HOSP IP/OBS HIGH 75: CPT | Performed by: PSYCHIATRY & NEUROLOGY

## 2021-11-24 PROCEDURE — 6360000002 HC RX W HCPCS: Performed by: INTERNAL MEDICINE

## 2021-11-24 PROCEDURE — 2580000003 HC RX 258: Performed by: INTERNAL MEDICINE

## 2021-11-24 PROCEDURE — 85610 PROTHROMBIN TIME: CPT

## 2021-11-24 PROCEDURE — 71250 CT THORAX DX C-: CPT

## 2021-11-24 PROCEDURE — 3700000000 HC ANESTHESIA ATTENDED CARE: Performed by: INTERNAL MEDICINE

## 2021-11-24 PROCEDURE — 85027 COMPLETE CBC AUTOMATED: CPT

## 2021-11-24 PROCEDURE — 6360000002 HC RX W HCPCS: Performed by: NURSE PRACTITIONER

## 2021-11-24 PROCEDURE — 6370000000 HC RX 637 (ALT 250 FOR IP): Performed by: INTERNAL MEDICINE

## 2021-11-24 PROCEDURE — 70551 MRI BRAIN STEM W/O DYE: CPT

## 2021-11-24 PROCEDURE — 2709999900 HC NON-CHARGEABLE SUPPLY: Performed by: INTERNAL MEDICINE

## 2021-11-24 PROCEDURE — 7100000011 HC PHASE II RECOVERY - ADDTL 15 MIN: Performed by: INTERNAL MEDICINE

## 2021-11-24 PROCEDURE — 6360000002 HC RX W HCPCS: Performed by: NURSE ANESTHETIST, CERTIFIED REGISTERED

## 2021-11-24 PROCEDURE — 0DB68ZX EXCISION OF STOMACH, VIA NATURAL OR ARTIFICIAL OPENING ENDOSCOPIC, DIAGNOSTIC: ICD-10-PCS | Performed by: INTERNAL MEDICINE

## 2021-11-24 PROCEDURE — 2580000003 HC RX 258: Performed by: NURSE PRACTITIONER

## 2021-11-24 PROCEDURE — 84484 ASSAY OF TROPONIN QUANT: CPT

## 2021-11-24 PROCEDURE — 43239 EGD BIOPSY SINGLE/MULTIPLE: CPT | Performed by: INTERNAL MEDICINE

## 2021-11-24 PROCEDURE — 6370000000 HC RX 637 (ALT 250 FOR IP): Performed by: NURSE PRACTITIONER

## 2021-11-24 PROCEDURE — 99223 1ST HOSP IP/OBS HIGH 75: CPT | Performed by: INTERNAL MEDICINE

## 2021-11-24 PROCEDURE — 3609012400 HC EGD TRANSORAL BIOPSY SINGLE/MULTIPLE: Performed by: INTERNAL MEDICINE

## 2021-11-24 PROCEDURE — 3700000001 HC ADD 15 MINUTES (ANESTHESIA): Performed by: INTERNAL MEDICINE

## 2021-11-24 PROCEDURE — 88305 TISSUE EXAM BY PATHOLOGIST: CPT

## 2021-11-24 PROCEDURE — 99222 1ST HOSP IP/OBS MODERATE 55: CPT | Performed by: INTERNAL MEDICINE

## 2021-11-24 PROCEDURE — 80053 COMPREHEN METABOLIC PANEL: CPT

## 2021-11-24 PROCEDURE — 83605 ASSAY OF LACTIC ACID: CPT

## 2021-11-24 PROCEDURE — 2060000000 HC ICU INTERMEDIATE R&B

## 2021-11-24 PROCEDURE — 7100000010 HC PHASE II RECOVERY - FIRST 15 MIN: Performed by: INTERNAL MEDICINE

## 2021-11-24 PROCEDURE — 2500000003 HC RX 250 WO HCPCS: Performed by: NURSE ANESTHETIST, CERTIFIED REGISTERED

## 2021-11-24 RX ORDER — BUDESONIDE AND FORMOTEROL FUMARATE DIHYDRATE 160; 4.5 UG/1; UG/1
2 AEROSOL RESPIRATORY (INHALATION) 2 TIMES DAILY
Status: DISCONTINUED | OUTPATIENT
Start: 2021-11-24 | End: 2021-11-30 | Stop reason: HOSPADM

## 2021-11-24 RX ORDER — CALCIUM CARBONATE 200(500)MG
500 TABLET,CHEWABLE ORAL 3 TIMES DAILY PRN
Status: DISCONTINUED | OUTPATIENT
Start: 2021-11-24 | End: 2021-11-26

## 2021-11-24 RX ORDER — METOPROLOL SUCCINATE 25 MG/1
25 TABLET, EXTENDED RELEASE ORAL DAILY
Status: DISCONTINUED | OUTPATIENT
Start: 2021-11-24 | End: 2021-11-24

## 2021-11-24 RX ORDER — TRAZODONE HYDROCHLORIDE 100 MG/1
100 TABLET ORAL NIGHTLY
Status: DISCONTINUED | OUTPATIENT
Start: 2021-11-24 | End: 2021-11-30 | Stop reason: HOSPADM

## 2021-11-24 RX ORDER — METOCLOPRAMIDE 5 MG/1
5 TABLET ORAL
Status: DISCONTINUED | OUTPATIENT
Start: 2021-11-24 | End: 2021-11-30 | Stop reason: HOSPADM

## 2021-11-24 RX ORDER — SODIUM CHLORIDE 9 MG/ML
25 INJECTION, SOLUTION INTRAVENOUS PRN
Status: DISCONTINUED | OUTPATIENT
Start: 2021-11-24 | End: 2021-11-30 | Stop reason: HOSPADM

## 2021-11-24 RX ORDER — SUCRALFATE 1 G/1
1 TABLET ORAL 4 TIMES DAILY
Status: DISCONTINUED | OUTPATIENT
Start: 2021-11-24 | End: 2021-11-30 | Stop reason: HOSPADM

## 2021-11-24 RX ORDER — AMITRIPTYLINE HYDROCHLORIDE 25 MG/1
25 TABLET, FILM COATED ORAL NIGHTLY
Status: DISCONTINUED | OUTPATIENT
Start: 2021-11-24 | End: 2021-11-27

## 2021-11-24 RX ORDER — LORAZEPAM 0.5 MG/1
1 TABLET ORAL EVERY 6 HOURS PRN
Status: DISCONTINUED | OUTPATIENT
Start: 2021-11-24 | End: 2021-11-30 | Stop reason: HOSPADM

## 2021-11-24 RX ORDER — POLYETHYLENE GLYCOL 3350 17 G/17G
17 POWDER, FOR SOLUTION ORAL EVERY OTHER DAY
Status: DISCONTINUED | OUTPATIENT
Start: 2021-11-24 | End: 2021-11-30 | Stop reason: HOSPADM

## 2021-11-24 RX ORDER — PROPOFOL 10 MG/ML
INJECTION, EMULSION INTRAVENOUS PRN
Status: DISCONTINUED | OUTPATIENT
Start: 2021-11-24 | End: 2021-11-24 | Stop reason: SDUPTHER

## 2021-11-24 RX ORDER — TAMSULOSIN HYDROCHLORIDE 0.4 MG/1
0.4 CAPSULE ORAL DAILY
Status: DISCONTINUED | OUTPATIENT
Start: 2021-11-24 | End: 2021-11-30 | Stop reason: HOSPADM

## 2021-11-24 RX ORDER — SODIUM CHLORIDE 0.9 % (FLUSH) 0.9 %
5-40 SYRINGE (ML) INJECTION EVERY 12 HOURS SCHEDULED
Status: DISCONTINUED | OUTPATIENT
Start: 2021-11-24 | End: 2021-11-30 | Stop reason: HOSPADM

## 2021-11-24 RX ORDER — ATORVASTATIN CALCIUM 80 MG/1
40 TABLET, FILM COATED ORAL DAILY
Status: DISCONTINUED | OUTPATIENT
Start: 2021-11-24 | End: 2021-11-30 | Stop reason: HOSPADM

## 2021-11-24 RX ORDER — BUSPIRONE HYDROCHLORIDE 10 MG/1
20 TABLET ORAL 3 TIMES DAILY
Status: DISCONTINUED | OUTPATIENT
Start: 2021-11-24 | End: 2021-11-27

## 2021-11-24 RX ORDER — SODIUM CHLORIDE 0.9 % (FLUSH) 0.9 %
5-40 SYRINGE (ML) INJECTION PRN
Status: DISCONTINUED | OUTPATIENT
Start: 2021-11-24 | End: 2021-11-30 | Stop reason: HOSPADM

## 2021-11-24 RX ORDER — ACETAMINOPHEN 650 MG/1
650 SUPPOSITORY RECTAL EVERY 6 HOURS PRN
Status: DISCONTINUED | OUTPATIENT
Start: 2021-11-24 | End: 2021-11-27

## 2021-11-24 RX ORDER — MORPHINE SULFATE 4 MG/ML
2 INJECTION, SOLUTION INTRAMUSCULAR; INTRAVENOUS
Status: DISCONTINUED | OUTPATIENT
Start: 2021-11-24 | End: 2021-11-30 | Stop reason: HOSPADM

## 2021-11-24 RX ORDER — FENTANYL CITRATE 50 UG/ML
25 INJECTION, SOLUTION INTRAMUSCULAR; INTRAVENOUS ONCE
Status: DISCONTINUED | OUTPATIENT
Start: 2021-11-24 | End: 2021-11-26

## 2021-11-24 RX ORDER — LIDOCAINE HYDROCHLORIDE 10 MG/ML
INJECTION, SOLUTION EPIDURAL; INFILTRATION; INTRACAUDAL; PERINEURAL PRN
Status: DISCONTINUED | OUTPATIENT
Start: 2021-11-24 | End: 2021-11-24 | Stop reason: SDUPTHER

## 2021-11-24 RX ORDER — SODIUM CHLORIDE 9 MG/ML
INJECTION, SOLUTION INTRAVENOUS CONTINUOUS
Status: DISCONTINUED | OUTPATIENT
Start: 2021-11-24 | End: 2021-11-28

## 2021-11-24 RX ORDER — ACETAMINOPHEN 325 MG/1
650 TABLET ORAL EVERY 6 HOURS PRN
Status: DISCONTINUED | OUTPATIENT
Start: 2021-11-24 | End: 2021-11-27

## 2021-11-24 RX ORDER — QUETIAPINE FUMARATE 25 MG/1
25 TABLET, FILM COATED ORAL 2 TIMES DAILY
Status: DISCONTINUED | OUTPATIENT
Start: 2021-11-24 | End: 2021-11-30 | Stop reason: HOSPADM

## 2021-11-24 RX ADMIN — MEROPENEM 1000 MG: 1 INJECTION, POWDER, FOR SOLUTION INTRAVENOUS at 12:26

## 2021-11-24 RX ADMIN — SUCRALFATE 1 G: 1 TABLET ORAL at 12:43

## 2021-11-24 RX ADMIN — ACETAMINOPHEN 650 MG: 325 TABLET ORAL at 08:17

## 2021-11-24 RX ADMIN — SODIUM CHLORIDE: 9 INJECTION, SOLUTION INTRAVENOUS at 19:53

## 2021-11-24 RX ADMIN — LORAZEPAM 1 MG: 0.5 TABLET ORAL at 12:28

## 2021-11-24 RX ADMIN — MEROPENEM 1000 MG: 1 INJECTION, POWDER, FOR SOLUTION INTRAVENOUS at 23:27

## 2021-11-24 RX ADMIN — TAMSULOSIN HYDROCHLORIDE 0.4 MG: 0.4 CAPSULE ORAL at 12:22

## 2021-11-24 RX ADMIN — ATORVASTATIN CALCIUM 40 MG: 80 TABLET, FILM COATED ORAL at 11:45

## 2021-11-24 RX ADMIN — PROPOFOL 170 MG: 10 INJECTION, EMULSION INTRAVENOUS at 16:35

## 2021-11-24 RX ADMIN — SODIUM CHLORIDE, PRESERVATIVE FREE 10 ML: 5 INJECTION INTRAVENOUS at 22:05

## 2021-11-24 RX ADMIN — QUETIAPINE FUMARATE 25 MG: 25 TABLET ORAL at 21:17

## 2021-11-24 RX ADMIN — AMITRIPTYLINE HYDROCHLORIDE 25 MG: 25 TABLET, FILM COATED ORAL at 21:18

## 2021-11-24 RX ADMIN — SUCRALFATE 1 G: 1 TABLET ORAL at 21:17

## 2021-11-24 RX ADMIN — BUSPIRONE HYDROCHLORIDE 20 MG: 10 TABLET ORAL at 11:46

## 2021-11-24 RX ADMIN — MORPHINE SULFATE 2 MG: 4 INJECTION, SOLUTION INTRAMUSCULAR; INTRAVENOUS at 12:24

## 2021-11-24 RX ADMIN — BUSPIRONE HYDROCHLORIDE 20 MG: 10 TABLET ORAL at 21:17

## 2021-11-24 RX ADMIN — ENOXAPARIN SODIUM 30 MG: 100 INJECTION SUBCUTANEOUS at 11:50

## 2021-11-24 RX ADMIN — MORPHINE SULFATE 2 MG: 4 INJECTION, SOLUTION INTRAMUSCULAR; INTRAVENOUS at 21:31

## 2021-11-24 RX ADMIN — TRAZODONE HYDROCHLORIDE 100 MG: 100 TABLET ORAL at 21:17

## 2021-11-24 RX ADMIN — SODIUM CHLORIDE: 9 INJECTION, SOLUTION INTRAVENOUS at 08:19

## 2021-11-24 RX ADMIN — QUETIAPINE FUMARATE 25 MG: 25 TABLET ORAL at 11:47

## 2021-11-24 RX ADMIN — METOCLOPRAMIDE 5 MG: 5 TABLET ORAL at 12:25

## 2021-11-24 RX ADMIN — LORAZEPAM 1 MG: 0.5 TABLET ORAL at 21:32

## 2021-11-24 RX ADMIN — LIDOCAINE HYDROCHLORIDE 50 MG: 10 INJECTION, SOLUTION EPIDURAL; INFILTRATION; INTRACAUDAL; PERINEURAL at 16:33

## 2021-11-24 ASSESSMENT — PAIN DESCRIPTION - DESCRIPTORS: DESCRIPTORS: CRAMPING;SHARP

## 2021-11-24 ASSESSMENT — PAIN SCALES - GENERAL
PAINLEVEL_OUTOF10: 0
PAINLEVEL_OUTOF10: 8
PAINLEVEL_OUTOF10: 6
PAINLEVEL_OUTOF10: 0
PAINLEVEL_OUTOF10: 7
PAINLEVEL_OUTOF10: 8

## 2021-11-24 ASSESSMENT — PAIN DESCRIPTION - PAIN TYPE: TYPE: ACUTE PAIN

## 2021-11-24 ASSESSMENT — LIFESTYLE VARIABLES: SMOKING_STATUS: 1

## 2021-11-24 ASSESSMENT — PAIN - FUNCTIONAL ASSESSMENT: PAIN_FUNCTIONAL_ASSESSMENT: 0-10

## 2021-11-24 ASSESSMENT — PAIN DESCRIPTION - LOCATION: LOCATION: ABDOMEN

## 2021-11-24 ASSESSMENT — COPD QUESTIONNAIRES: CAT_SEVERITY: MODERATE

## 2021-11-24 NOTE — CONSULTS
[facility] - Initial Consult Note  Today's Date and Time: 11/24/2021, 1:07 PM    Impression :   · Recurrent Aspiration pneumonia  · Hiatal hernia  · Distal esophageal dilatation with stasis  · Anxiety  · Hx of MDRO, ESBL + bacteria    Recommendations:   · Continue meropenem. · Descalate with culture results    Medical Decision Making/Summary/Discussion:   ·   Infection Control Recommendations   · [place] Precautions  · Contact Isolation   · Airborne isolation    Antimicrobial Stewardship Recommendations     · Simplification of therapy  · Targeted therapy    Coordination of Outpatient Care:   · Estimated Length of IV antimicrobials:TBD  · Patient will need Midline Catheter Insertion: no  · Patient will need PICC line Insertion:No  · Patient will need: Home IV , [place],  SNF,  LTAC: TBD  · Patient will need outpatient wound care:No    Chief complaint/reason for consultation:   · Aspiration pneumonia      History of Present Illness:   Ricardo East is a 68y.o.-year-old  female who was initially admitted on 11/23/2021. Patient seen at the request of Dr. Dimas Rendon    [facility] of anxiety/depression    INITIAL HISTORY:    Patient  presented with a fever, T 103, and 3 weeks of nausea/vomiting and abdominal pain, constipation x 5 days, expressive aphasia. Denied any headache or weakness in her upper or lower extremities. At the ED, she was Saturating on room air,   Pertinent labs include Cr 1.18, lactic acid 2.3, wbc 8.9,     CT chest concerning for chronic recurrent aspiration pneumonia. Hiatal hernia, distal esophageal distension with stasis of refluxed fluid. ID consulted      CURRENT EVALUATION: 11/24/21     Patient underwent EGD with findings of:  · Tortuous esophagus with dilated distal third. · Evidence of prior fundoplication which appeared to be partially undone  · Sarah-Abrams tear.   · Severely congested and inflamed appearing proximal stomach  · Normal distal stomach  · Normal examined duodenum       Afebrile  VS stable    On room air  RR 14  02 sat 93        Labs:    Creatinine: 1.21  BUN:13    Hgb: 12.3  Platelet: 062  WBC: 7.9    Lactic acid 2.3-->0.9    Cultures:  Urine:  · 11/23/2021: No growth  Blood:  · 11/23/2021 x 2 No growth  Sputum :  ·   Wound:     CSF     CT chest 11/24/2021    CT Head WO Contrast    Result Date: 11/23/2021  No CT evidence of an acute infarct. The findings were sent to the Radiology Results [address] at 8:54 pm on 11/23/2021to be communicated to a licensed caregiver. CT CHEST WO CONTRAST    Result Date: 11/24/2021  1. Basilar opacities, right greater than left, with central airway congestion and filling of the peripheral subsegmental bronchi in the right lower lobe. Overall these features are suggestive of chronic/recurrent aspiration. 2.  Hiatal hernia with diffuse mild esophageal dilatation with fluid, which may represent reflux or stasis. 3.  Emphysema. CT ABDOMEN PELVIS W IV CONTRAST Additional Contrast? None    Result Date: 11/23/2021  Moderate hiatal hernia with a fluid-filled patulous distal esophagus which is similar to prior comparison exams. Distended stomach with a normal caliber duodenum. Query possible gastroparesis or functional outlet obstruction. Possible findings of proctitis. Correlate clinically. Hepatomegaly with steatosis, unchanged. Findings in the dependent lung bases which are suspicious for sequelae of aspiration pneumonia in the appropriate clinical setting. XR CHEST PORTABLE    Result Date: 11/23/2021  Chronic lung changes with right basilar airspace opacity concerning for underlying pneumonia in the appropriate clinical setting. Correlate with aspiration risk. CTA HEAD NECK W CONTRAST    Result Date: 11/23/2021  No large vessel occlusion visualized within the head or neck. Incidentally noted pulmonary emphysema and dilated, air and fluid-filled esophagus.      Discussed with patient, RN, family. I have personally reviewed the past medical history, past surgical history, medications, social history, and family history, and I have updated the database accordingly.   Past Medical History:     Past Medical History:   Diagnosis Date    Anxiety     Arthritis     Back pain     Bronchitis     Caffeine use     8 coffee / day    Carpal tunnel syndrome     Cataracts, bilateral     Constipation     Depression     Diarrhea     GERD (gastroesophageal reflux disease)     H/O gastric ulcer     Hyperlipidemia     Hypertension     Mumps     MVP (mitral valve prolapse)     Dr. Lynnell Apgar in May 2019    Recurrent UTI     Dr. Peri Escamilla    Sinusitis     Tr[facility] in [facility])     Ulcerative esophagitis     Wellness examination     Dr. Brad Segundo seen in 2020       Past Surgical  History:     Past Surgical History:   Procedure Laterality Date    APPENDECTOMY      CARPAL TUNNEL RELEASE      CHOLECYSTECTOMY, LAPAROSCOPIC N/A 2020    XI LAPAROSCOPIC ROBOTIC CHOLECYSTECTOMY, PYLOROPLASTY performed by [facility] at [facility] ERCP  2020    with stent insertion, balloon dilation, sphinctereotomy    ERCP  2020    ** CASE IN OR WITY GI STAFF** ERCP STENT INSERTION performed by Janis Rosa MD at [facility]    ERCP  2020    ** CASE IN OR WITH GI STAFF**ERCP SPHINCTER/PAPILLOTOMY performed by Janis Rosa MD at [facility]    ERCP  2020    ** CASE IN OR WITH GI STAFF**ERCP DILATION BALLOON performed by Janis Rosa MD at [facility]    ERCP N/A 2021    ERCP STENT REMOVAL performed by Debby Uribe MD at [facility]    ERCP  2021    ERCP STONE REMOVAL performed by Debby Uribe MD at [address]    GASTRIC FUNDOPLICATION N/A     XI LAPAROSCOPIC ROBOTIC HIATAL HERNIA REPAIR, CHERYL FUNDOPLICATION performed by [facility] at [facility] Every Other Day    QUEtiapine  25 mg Oral BID    atorvastatin  40 mg Oral Daily    sucralfate  1 g Oral 4x Daily    traZODone  100 mg Oral Nightly    tamsulosin  0.4 mg Oral Daily       Social History:     Social History     Socioeconomic History    Marital status:      Spouse name: Not on file    Number of children: 2    Years of education: Not on file    Highest education level: Not on file   Occupational History    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   Tobacco Use    Smoking status: Former Smoker     Packs/day: 1.00     Years: 50.00     Pack years: 50.00     Types: Cigarettes    Smokeless tobacco: Never Used    Tobacco comment: quit 1 year ago    Vaping Use    Vaping Use: Former    Substances: Always   Substance and Sexual Activity    Alcohol use: No    Drug use: No    Sexual activity: Never   Other Topics Concern    Not on file   Social History Narrative    Not on file     Social Determinants of Health     Financial Resource Strain:     Difficulty of Paying Living Expenses: Not on file   Food Insecurity:     Worried About [address] in the Last Year: Not on file    [address] in the Last Year: Not on file   Transportation Needs:     Lack of Transportation (Medical): Not on file    Lack of Transportation (Non-Medical):  Not on file   Physical Activity:     Days of Exercise per Week: Not on file    Minutes of Exercise per Session: Not on file   Stress:     Feeling of Stress : Not on file   Social Connections:     Frequency of Communication with Friends and Family: Not on file    Frequency of Social Gatherings with Friends and Family: Not on file    Attends [employer] Services: Not on file    Active Member of Clubs or Organizations: Not on file    Attends Club or Organization Meetings: Not on file    Marital Status: Not on file   Intimate Partner Violence:     Fear of Current or Ex-Partner: Not on file    Emotionally Abused: Not on file    Physically Abused: Not on file  Sexually Abused: Not on file   Housing Stability:     Unable to Pay for Housing in the Last Year: Not on file    Number of Places Lived in the Last Year: Not on file    Unstable Housing in the Last Year: Not on file       Family History:     Family History   Problem Relation Age of Onset    Cancer Mother         uterine    Heart Attack Mother     Heart Attack Father     Other Maternal Grandfather         foot gangrene    Other Paternal Grandmother         bleeding ulcers    Other Paternal Grandfather         lung cancer        Allergies:   Prednisone, Compazine [prochlorperazine maleate], Penicillin v potassium, and Penicillins     Review of Systems:   Constitutional: No fevers or chills. No systemic complaints  Head: No headaches  Eyes: No double vision or blurry vision. No conjunctival inflammation. ENT: No sore throat or runny nose. . No hearing loss, tinnitus or vertigo. Cardiovascular: No chest pain or palpitations. No shortness of breath. No HOLLAND  Lung: No shortness of breath or cough. No sputum production  Abdomen: No nausea, vomiting, diarrhea, or abdominal pain. Pinky Angles No cramps. Genitourinary: No increased urinary frequency, or dysuria. No hematuria. No suprapubic or CVA pain  Musculoskeletal: No muscle aches or pains. No joint effusions, swelling or deformities  Hematologic: No bleeding or bruising. Neurologic: No headache, weakness, numbness, or tingling. Integument: No rash, no ulcers. Psychiatric: No depression. Endocrine: No polyuria, no polydipsia, no polyphagia. Physical Examination :     Patient Vitals for the past 8 hrs:   BP Pulse SpO2   11/24/21 0749 111/65 71 93 %     General Appearance: Awake, alert, and in no apparent distress  Head:  Normocephalic, no trauma  Eyes: Pupils equal, round, reactive to light and accommodation; extraocular movements intact; sclera anicteric; conjunctivae pink. No embolic phenomena. ENT: Oropharynx clear, without erythema, exudate, or thrush.  No tenderness of sinuses. Mouth/throat: mucosa pink and moist. No lesions. Dentition in good repair. Neck:Supple, without lymphadenopathy. Thyroid normal, No bruits. Pulmonary/Chest: Clear to auscultation, without wheezes, rales, or rhonchi. No dullness to percussion. No egophony. Cardiovascular: Regular rate and rhythm without murmurs, rubs, or gallops. Abdomen: Soft, non tender. Bowel sounds normal. No organomegaly  All four Extremities: No cyanosis, clubbing, edema, or effusions. Neurologic: No gross sensory or motor deficits. Skin: Warm and dry with good turgor. No signs of peripheral arterial or venous insufficiency. No ulcerations. No open wounds. Medical Decision Making -Laboratory:   I have independently reviewed/ordered the following labs:    CBC with Differential:   Recent Labs     11/23/21 2043 11/24/21  0631   WBC 8.9 7.9   HGB 13.4 12.3   HCT 42.2 38.1    155   LYMPHOPCT 8*  --    MONOPCT 9  --      BMP:   Recent Labs     11/23/21 2043 11/23/21  2101 11/24/21  0631   *  --  136   K 4.2  --  3.9   CL 98  --  102   CO2 20  --  24   BUN 17  --  13   CREATININE 1.18* 1.44* 1.21*     Hepatic Function Panel:   Recent Labs     11/24/21  0631   PROT 6.1*   LABALBU 3.4*   BILITOT 0.39   ALKPHOS 118*   ALT 15   AST 15     No results for input(s): RPR in the last 72 hours. No results for input(s): HIV in the last 72 hours. No results for input(s): BC in the last 72 hours. Lab Results   Component Value Date    MUCUS NOT REPORTED 11/23/2021    RBC 4.30 11/24/2021    TRICHOMONAS NOT REPORTED 11/23/2021    WBC 7.9 11/24/2021    YEAST NOT REPORTED 11/23/2021    TURBIDITY Clear 11/23/2021     Lab Results   Component Value Date    CREATININE 1.21 11/24/2021    GLUCOSE 103 11/24/2021       Medical Decision Making-Imaging:       Medical Decision Making-Other: Thank you for allowing us to participate in the care of this patient. Please call with questions.     Kira De La Garza MD ATTESTATION:    I have discussed the case, including pertinent history and exam findings with the residents and students. I have seen and examined the patient and the key elements of the encounter have been performed by me. I was present when the student obtained his information or examined the patient. I have reviewed the laboratory data, other diagnostic studies and discussed them with the residents. I have updated the medical record where necessary. I agree with the assessment, plan and orders as documented by the resident/ student.     Niranjan Kauffman MD.    Pager: [phone] - Office: [phone]

## 2021-11-24 NOTE — CONSULTS
[id number] [facility] Neurology   IN-PATIENT SERVICE    STROKE CONSULT  NOTE            Date:   11/23/2021  Patient name:  Lucian Tillman  Date of admission:  11/23/2021  YOB: 1945      Chief Complaint:     Chief Complaint   Patient presents with    Abdominal Pain    Altered Mental Status       Reason for Consult:      Aphasia, AMS    History of Present Illness: The patient is a 68 y.o. female who presents with Abdominal Pain and Altered Mental Status   and she is admitted to the hospital on 11/23/2021 for the management of bowel obstruction. Patient states she had been having speech difficulty since yesterday, unsure when the symptoms started. Patient describes it as being able to think of words but having difficulty seeing them out. She stated that it felt like she could not get them out right. States her symptoms continue to get worse so the nursing facility she states that called EMS. Currently patient symptoms are improving. NIH 0 at presentation. CT head and CTA head and neck unremarkable for any acute abnormality. CT abdomen pelvis ordered by ED team showed possible bowel obstruction. Patient states she has had similar symptoms in the past when she has had a UTI. Denies taking any blood thinners, history of strokes, seizures or CAD.       Prior to arrival patient was on  Antiplatelets/anticoagulants: None  Statins: Zocor 40 mg Nightly      Past Medical History:     Past Medical History:   Diagnosis Date    Anxiety     Arthritis     Back pain     Bronchitis     Caffeine use     8 coffee / day    Carpal tunnel syndrome     Cataracts, bilateral     Constipation     Depression     Diarrhea     GERD (gastroesophageal reflux disease)     H/O gastric ulcer     Hyperlipidemia     Hypertension     Mumps     MVP (mitral valve prolapse)     Dr. Vinie Eisenmenger in May 2019    Recurrent UTI     Dr. Mahsa Franklin    Sinusitis     Trache[facility] in place [facility])     Ulcerative esophagitis     Wellness examination     Dr. Chula Bridges seen in Jan 2020        Past Surgical History:     Past Surgical History:   Procedure Laterality Date    APPENDECTOMY      CARPAL TUNNEL RELEASE      CHOLECYSTECTOMY, LAPAROSCOPIC N/A 05/22/2020    XI LAPAROSCOPIC ROBOTIC CHOLECYSTECTOMY, PYLOROPLASTY performed by Mary Cevallos MD at [facility]  05/21/2020    with stent insertion, balloon dilation, sphinctereotomy    ERCP  05/21/2020    ** CASE IN OR WITY GI STAFF** ERCP STENT INSERTION performed by Ranjit Knapp MD at [facility]"" Endoscopy    ERCP  05/21/2020    ** CASE IN OR WITH GI STAFF**ERCP SPHINCTER/PAPILLOTOMY performed by Ranjit Knapp MD at [facility]    ERCP  05/21/2020    ** CASE IN OR WITH GI STAFF**ERCP DILATION BALLOON performed by Ranjit Knapp MD at \[facility]"" Endoscopy    ERCP N/A 2/8/2021    ERCP STENT REMOVAL performed by Joey Wiley MD at [facility]    ERCP  2/8/2021    ERCP STONE REMOVAL performed by Joey Wiley MD at [address]    GASTRIC FUNDOPLICATION N/A 83/23/1909    XI LAPAROSCOPIC ROBOTIC HIATAL HERNIA REPAIR, CHERYL FUNDOPLICATION performed by Mary Cevallos MD at [facility] N/A 03/27/2020    EGD PEG TUBE PLACEMENT performed by Mary Cevallos MD at [facility] N/A 2/8/2021    **CASE IN O.R. W/ GI STAFF - EGD WITH REMOVAL PEG TUBE performed by Joey Wiley MD at \[facility]"" Endoscopy    [address] CATH POWER PICC TRIPLE  03/04/2020         HYSTERECTOMY      Abdominal    JOINT REPLACEMENT Right     right hip    OVARY REMOVAL      RHINOPLASTY      TONSILLECTOMY      TOTAL HIP ARTHROPLASTY      TOTAL NEPHRECTOMY      atrophic from infections, age 13   [place] TRACHEOSTOMY N/A 03/27/2020    **ADD ON **WANTS 10:00AM **TRACHEOTOMY performed by Mary Cevallos MD at [address] 04/02/2020    TRACHEOTOMY EXCHANGE performed by [facility] uNnes MD at [address] ENDOSCOPY N/A 03/17/2020    BEDSIDE EGD ESOPHAGOGASTRODUODENOSCOPY (ICU) performed by Stanford Washington MD at [address] [place] [address] N/A 05/18/2020    EGD BIOPSY performed by Christelle Tomlin MD at [address] [place] [address]  05/18/2020    EGD DILATION BALLOON performed by Christelle Tomlin MD at [address] [place] [address] N/A 07/06/2020    ** CASE IN O.R. WITH GI STAFF** EGD ESOPHAGOGASTRODUODENOSCOPY performed by Jermaine Catherine MD at [address] [place] [address] N/A 11/09/2020    EGD DIAGNOSTIC ONLY performed by Na Coello MD at [address] [place] [address]  02/08/2021    - EGD WITH REMOVAL PEG TUBE,ERCP STENT REMOVAL,ERCP STONE REMOVAL        Medications Prior to Admission:     Prior to Admission medications    Medication Sig Start Date End Date Taking? Authorizing Provider   bisacodyl (DULCOLAX) 10 MG suppository Place 10 mg rectally daily as needed for Constipation    Historical Provider, MD   Sodium Phosphates (FLEET) 7-19 GM/118ML Place 1 enema rectally As needed after no BM for 5 days    Historical Provider, MD   clotrimazole-betamethasone (LOTRISONE) 1-0.05 % cream Apply topically 2 times daily Apply topically 2 times daily.     Historical Provider, MD   magnesium hydroxide (MILK OF MAGNESIA) 400 MG/5ML suspension Take by mouth daily as needed for Constipation    Historical Provider, MD   polyethylene glycol (GLYCOLAX) 17 g packet Take 17 g by mouth every other day    Historical Provider, MD   metoclopramide (REGLAN) 5 MG tablet Take 5 mg by mouth 3 times daily (before meals) For nausea    Historical Provider, MD   budesonide-formoterol (SYMBICORT) 160-4.5 MCG/ACT AERO Inhale 2 puffs into the lungs 2 times daily    Historical Provider, MD   gabapentin (NEURONTIN) 300 MG capsule Take 1 capsule by mouth 3 times daily for 30 days. 2/10/21 3/12/21  Howard Nicholas MD   QUEtiapine (SEROQUEL) 25 MG tablet Take 1 tablet by mouth 2 times daily 12/16/20   MARGOTH Nelson NP   ferrous sulfate (FE TABS 325) 325 (65 Fe) MG EC tablet Take 1 tablet by mouth daily (with breakfast) 12/16/20   MARGOTH Nelson - NP   furosemide (LASIX) 20 MG tablet Take 1 tablet by mouth daily 12/16/20   MARGOTH Nelson - NP   gabapentin (NEURONTIN) 300 MG capsule Take 1 capsule by mouth 3 times daily for 30 days. 12/16/20 9/10/21  MARGOTH Nelson NP   clonazePAM (KLONOPIN) 0.5 MG tablet Take 0.5 tablets by mouth 2 times daily for 60 days. Taking 1/2 tablet BID 7/7/20 9/10/21  Mckenna Holland MD   pantoprazole (PROTONIX) 40 MG tablet Take 1 tablet by mouth 2 times daily 7/7/20   Mckenna Holland MD   amitriptyline (ELAVIL) 25 MG tablet Take 1 tablet by mouth nightly 7/14/20   Mckenna Holland MD   busPIRone (BUSPAR) 10 MG tablet Take 2 tablets by mouth 3 times daily 7/14/20   Mckenna Holland MD   metoprolol succinate (TOPROL XL) 25 MG extended release tablet Take 1 tablet by mouth daily 5/30/20   Carlos Jerry MD   docusate (COLACE) 50 MG/5ML liquid 10 mLs by Per G Tube route 2 times daily 4/6/20   Vida Garay MD   sucralfate (CARAFATE) 1 GM tablet Take 1 tablet by mouth 4 times daily 4/6/20   Vida Garay MD   traZODone (DESYREL) 100 MG tablet Take 1 tablet by mouth nightly 4/6/20   Vida Garay MD RA VITAMIN D-3 50 MCG (2000 UT) CAPS take 1 capsule by mouth once daily 2/14/20   Historical Provider, MD   Oyster Shell 500 MG TABS Take 500 mg by mouth 2 times daily     Historical Provider, MD   calcium carbonate (TUMS) 500 MG chewable tablet Take 1 tablet by mouth 3 times daily as needed for Heartburn    Historical Provider, MD   simvastatin (ZOCOR) 40 MG tablet Take 40 mg by mouth nightly.     Historical Provider, MD        Allergies:     Prednisone, Compazine [prochlorperazine maleate], Penicillin v potassium, and Penicillins    Social History:     Tobacco:    reports that she has quit smoking. Her smoking use included cigarettes. She has a 50.00 pack-year smoking history. She has never used smokeless tobacco.  Alcohol:      reports no history of alcohol use. Drug Use:  reports no history of drug use.     Family History:     Family History   Problem Relation Age of Onset    Cancer Mother         uterine    Heart Attack Mother     Heart Attack Father     Other Maternal Grandfather         foot gangrene    Other Paternal Grandmother         bleeding ulcers    Other Paternal Grandfather         lung cancer       Review of Systems:       Constitutional Negative for fever and chills   HEENT Negative for ear discharge, ear pain, nosebleed   Eyes Negative for photophobia, pain and discharge   Respiratory Negative for hemoptysis and sputum   Cardiovascular Negative for orthopnea, claudication and PND   Gastrointestinal Negative for abdominal pain, diarrhea, blood in stool   Musculoskeletal Negative for joint pain, negative for myalgia   Skin Negative for rash or itching   hematology Negative for ecchymosis, anemia   Psychiatric Negative for suicidal ideation, anxiety, depression, hallucinations       Physical Exam:   /70   Pulse 92   Temp 103 °F (39.4 °C) (Oral)   Resp 18   Ht 5' 2\" (1.575 m)   Wt 170 lb (77.1 kg)   SpO2 95%   BMI 31.09 kg/m²   Temp (24hrs), Av °F (39.4 °C), Min:103 °F (39.4 °C), Max:103 °F (39.4 °C)        General examination:      General Appearance:  alert, well appearing, and in no acute distress  HEENT: Normocephalic, atraumatic, moist mucus membranes  Neck: supple, no carotid bruits, (-) nuchal rigidity  Lungs:  Respirations unlabored, chest wall no deformity, BS normal  Cardiovascular: normal rate, regular rhythm  Abdomen: Soft, nontender, nondistended, normal bowel sounds  Skin: No gross lesions, rashes, bruising or bleeding on exposed skin area  Extremities:  peripheral pulses palpable, clubbing or edema  Psych: normal affect    NEUROLOGIC EXAMINATION    Mental status   Alert and oriented x 3; following all commands;   speech is fluent, no dysarthria, aphasia. Cranial nerves   II - visual fields intact to confrontation; pupils reactive  III, IV, VI - extraocular muscles intact; no VERONICA; no nystagmus; no ptosis   V - normal facial sensation                                                               VII - normal facial symmetry                                                             VIII - intact hearing                                                                             IX, X - symmetrical palate elevation                                               XI - symmetrical shoulder shrug                                                       XII - midline tongue without atrophy or fasciculation     Motor function  Strength:   5/5 RUE, 5/5 RLE  5/5 LUE, 5/5  LLE  Normal bulk and tone. Sensory function Intact to touch, pin, vibration, proprioception throughout     Cerebellar Intact finger-nose-finger testing. Intact heel-shin testing. No dysdiadochokinesia present. No tremors                        Reflex function 2/4 symmetric throughout . Downgoing plantar response bilaterally. (-)Cabrera's sign bilaterally      Gait                  Normal station and gait. Normal Tandem, tip toes and heel walking         NIH STROKE SCALE:    Time Performed:  8:25 PM     1a. Level of consciousness:  0 - alert; keenly responsive  1b. Level of consciousness questions:  0 - answers both questions correctly  1c. Level of consciousness questions:  0 - performs both tasks correctly  2. Best Gaze:  0 - normal  3. Visual:  0 - no visual loss  4. Facial Palsy:  0 - normal symmetric movement  5a. Motor left arm:  0 - no drift, limb holds 90 (or 45) degrees for full 10 seconds  5b. Motor right arm:  0 - no drift, limb holds 90 (or 45) degrees for full 10 seconds  6a.   Motor left le - no drift; leg holds 30 degree position for full 5 seconds  6b. Motor right le - no drift; leg holds 30 degree position for full 5 seconds  7. Limb Ataxia:  0 - absent  8. Sensory:  0 - normal; no sensory loss  9. Best Language:  0 - no aphasia, normal  10. Dysarthria:  0 - normal  11. Extinction and Inattention:  0 - no abnormality    TOTAL:  0    Diagnostics:      Laboratory Testing:    CBC: No results for input(s): WBC, HGB, PLT in the last 72 hours. BMP:  No results for input(s): NA, K, CL, CO2, BUN, CREATININE, GLUCOSE in the last 72 hours.       Lab Results   Component Value Date    CHOL 203 (H) 2020    LDLCHOLESTEROL 106 2020    HDL 43 2020    TRIG 268 (H) 2020    ALT 21 09/10/2021    AST 17 09/10/2021    INR 1.1 09/10/2021    LABA1C 5.0 2020           Imaging/Diagnostics:      CT head     CTA head and neck       Assessment and plan:       Patient Active Problem List   Diagnosis    Frequency of urination    Back pain    GERD (gastroesophageal reflux disease)    MVP (mitral valve prolapse)    Depression    Anxiety    [address]ENEDICT (acute kidney injury) ([address]    Dysphagia    Hiatal hernia    History of repair of hiatal hernia    Centrilobular emphysema (HCC)    Esophageal dilatation    Shock ([address]    Fever    Critical illness polyneuropathy ([address]    Gastric outlet obstruction    Recurrent UTI    Tracheostomy in place ([address]    H/O gastric ulcer    Hyperlipidemia    Hypertension    Tobacco abuse    Chronic respiratory failure with hypoxia (HCC)    S/P percutaneous endoscopic gastrostomy (PEG) tube placement (HCC)    S/P Nissen fundoplication (without gastrostomy tube) procedure    Acute blood loss anemia    Phlebitis after infusion    Esophagitis determined by endoscopy    Expressive aphasia    Transient speech disturbance    Speech disturbance    Coffee ground emesis    Acute cystitis without hematuria    Ulcerative esophagitis       68 y.o. female who presents with speech difficulty since yesterday, states her symptoms were getting worse so the facility called EMS. Patient states her symptoms are much better right now, has prior history of similar symptoms with UTI. Nonfocal neurological exam, NIH 0. Last known well sometime yesterday, unsure when the symptoms started. CT head and CTA head and neck unremarkable. Metabolic work-up per ED and admitting team.  Concern for bowel obstruction on CT abdomen pelvis. - Discussed with Dr. Gio Rajan and Dr. Viviane Funes ordered by ED team   - No antiplatelet agents    - Folic acid 1mg BID   - Zocor 40mg nightly    - Fasting Lipid panel, HbA1c   - PT, OT, Speech eval     - Telemetry    - Neuro checks per protocol  - We recommend SBP normotensive  - Blood glucose goal less than 180  - Please avoid dextrose containing solutions    Additional recommendations may follow    Please contact [facility] with any changes in patients neurologic status. Thank you for your consult.       Los Hurt MD   PGY 2 Neurology Resident  11/23/2021 at 8:42 PM

## 2021-11-24 NOTE — H&P
[facility]  Office: [address], DO, Lylemirta Forrester, DO, Litzy Andrade, DO, Terri Whyte Blood, DO, Jaime Toribio MD, Carmelita Henry MD, Elena Jackson MD, Barby Andersen MD, Vani Palma MD, Michael Guzman MD, Ciaran Fowler MD, Asif Rodriguez, DO, Renate Lawrence DO, Dario Frazier MD,  Negra Pappas DO, Marianna Guy MD, Bruno Ennis MD, Jakub Cabrera MD, Haylee Driscoll MD, Sari Rodrigues MD, Issac Ovalle MD, Verónica Delgado MD, Sendy Valle, [facility], [facility], CNP, Mireya Larson, CNP, Canelo Arnett, CNS, Jazmine Rand, CNP, Flora Berry, CNP, Candido Landin, CNP, Naresh Parsons, CNP, Sue Morris, CNP, Eros Gillespie PA-C, Keshia Oneal, [facility], Va Chaves, CNP, Whit Srivastava, CNP, Aria Vaughn, CNP, Jeovanny Duenas, CNP, Carolee Rivera, CNP, Jose Alberto Rainey, CNP, Ben Kolb, [address]    HISTORY AND PHYSICAL EXAMINATION            Date:   11/24/2021  Patient name:  Bassam Rosenberg  Date of admission:  11/23/2021  8:12 PM  MRN:   [id number]  Account:  [de-identified]  YOB: 1945  PCP:    Diandra Priest MD  Room:   07/07  Code Status:    Full Code    Chief Complaint:     Abdominal pain, unable to speak    History Obtained From:     patient, electronic medical record    History of Present Illness:     Bassam Rosenberg is a 68 y.o. Non- / non  female who presents with Abdominal Pain and Altered Mental Status   and is admitted to the hospital for the management of Expressive aphasia. Pt is a 67 yo  female with a PMH of anxiety/depression, s/p Deshaun fundoplication, frequent constipation, who presented with a fever, T 103, and 3 weeks of nausea/vomiting and abdominal pain. Patient reports feeling weak and having an episode of difficulty speaking last night. She complained of bilateral lower abdominal pain, and constipation x 5 days.   She had a suppository last night at her extended [facility]    ERCP N/A 2/8/2021    ERCP STENT REMOVAL performed by Mike Ware MD at [facility]    ERCP  2/8/2021    ERCP STONE REMOVAL performed by Mike Ware MD at [place] [address]      patricia IOL    GASTRIC FUNDOPLICATION N/A [id number]/9571    XI LAPAROSCOPIC ROBOTIC HIATAL HERNIA REPAIR, CHERYL FUNDOPLICATION performed by Ry Brunson MD at [address] [place] [address] 03/27/2020    EGD PEG TUBE PLACEMENT performed by Ry Brunson MD at [address] [place] [address] N/A 2/8/2021    **CASE IN O.R. W/ GI STAFF - EGD WITH REMOVAL PEG TUBE performed by [facility] MD at [facility]    HAND SURGERY      [facility] CATH POWER PICC TRIPLE  03/04/2020         HYSTERECTOMY      Abdominal    JOINT REPLACEMENT Right     right hip    OVARY REMOVAL      RHINOPLASTY      TONSILLECTOMY      TOTAL HIP ARTHROPLASTY      TOTAL NEPHRECTOMY      atrophic from infections, age 13   Verl Raw TRACHEOSTOMY N/A 03/27/2020    **ADD ON **WANTS 10:00AM **TRACHEOTOMY performed by Ry Brunson MD at [facility] 04/02/2020    TRACHEOTOMY EXCHANGE performed by Ry Brunson MD at [address] [place] [address] N/A 03/17/2020    BEDSIDE EGD ESOPHAGOGASTRODUODENOSCOPY (ICU) performed by Ry Brunson MD at [address] [place] [address] N/A 05/18/2020    EGD BIOPSY performed by Belgica Pablo MD at [address] [place] [address]  05/18/2020    EGD DILATION BALLOON performed by Belgica Pablo MD at [address] [place] [address] N/A 07/06/2020    ** CASE IN O.R. WITH GI STAFF** EGD ESOPHAGOGASTRODUODENOSCOPY performed by Kyle Rojas MD at [address] [place] [address] 11/09/2020    EGD DIAGNOSTIC ONLY performed by Miky Barrera MD at [address] [place] [address]  02/08/2021    - EGD WITH REMOVAL PEG TUBE,ERCP STENT REMOVAL,ERCP STONE REMOVAL        Medications Prior to Admission:     Prior to Admission medications    Medication Sig Start Date End Date Taking? Authorizing Provider   bisacodyl (DULCOLAX) 10 MG suppository Place 10 mg rectally daily as needed for Constipation    Historical Provider, MD   Sodium Phosphates (FLEET) 7-19 GM/118ML Place 1 enema rectally As needed after no BM for 5 days    Historical Provider, MD   clotrimazole-betamethasone (LOTRISONE) 1-0.05 % cream Apply topically 2 times daily Apply topically 2 times daily. Historical Provider, MD   magnesium hydroxide (MILK OF MAGNESIA) 400 MG/5ML suspension Take by mouth daily as needed for Constipation    Historical Provider, MD   polyethylene glycol (GLYCOLAX) 17 g packet Take 17 g by mouth every other day    Historical Provider, MD   metoclopramide (REGLAN) 5 MG tablet Take 5 mg by mouth 3 times daily (before meals) For nausea    Historical Provider, MD   budesonide-formoterol (SYMBICORT) 160-4.5 MCG/ACT AERO Inhale 2 puffs into the lungs 2 times daily    Historical Provider, MD   gabapentin (NEURONTIN) 300 MG capsule Take 1 capsule by mouth 3 times daily for 30 days. 2/10/21 3/12/21  Howard Stroud MD   QUEtiapine (SEROQUEL) 25 MG tablet Take 1 tablet by mouth 2 times daily 12/16/20   MARGOTH James NP   ferrous sulfate (FE TABS 325) 325 (65 Fe) MG EC tablet Take 1 tablet by mouth daily (with breakfast) 12/16/20   MARGOTH James NP   furosemide (LASIX) 20 MG tablet Take 1 tablet by mouth daily 12/16/20   MARGOTH James NP   gabapentin (NEURONTIN) 300 MG capsule Take 1 capsule by mouth 3 times daily for 30 days. 12/16/20 9/10/21  MARGOTH James NP   clonazePAM (KLONOPIN) 0.5 MG tablet Take 0.5 tablets by mouth 2 times daily for 60 days.  Taking 1/2 tablet BID 7/7/20 9/10/21  Daiana Liu MD   pantoprazole (PROTONIX) 40 MG tablet Take 1 tablet by mouth 2 times daily 7/7/20   Daiana Liu MD   amitriptyline (ELAVIL) 25 MG tablet Take 1 tablet by mouth nightly 7/14/20   Carlso Puente MD   busPIRone (BUSPAR) 10 MG tablet Take 2 tablets by mouth 3 times daily 7/14/20   Carlos Puente MD   metoprolol succinate (TOPROL XL) 25 MG extended release tablet Take 1 tablet by mouth daily 5/30/20   Azam Frank MD   docusate (COLACE) 50 MG/5ML liquid 10 mLs by Per G Tube route 2 times daily 4/6/20   Sherry Garcia MD   sucralfate (CARAFATE) 1 GM tablet Take 1 tablet by mouth 4 times daily 4/6/20   Sherry Garcia MD   traZODone (DESYREL) 100 MG tablet Take 1 tablet by mouth nightly 4/6/20   Sherry Garcia MD RA VITAMIN D-3 50 MCG (2000 UT) CAPS take 1 capsule by mouth once daily 2/14/20   Historical Provider, MD   Oyster Shell 500 MG TABS Take 500 mg by mouth 2 times daily     Historical Provider, MD   calcium carbonate (TUMS) 500 MG chewable tablet Take 1 tablet by mouth 3 times daily as needed for Heartburn    Historical Provider, MD   simvastatin (ZOCOR) 40 MG tablet Take 40 mg by mouth nightly. Historical Provider, MD        Allergies:     Prednisone, Compazine [prochlorperazine maleate], Penicillin v potassium, and Penicillins    Social History:     Tobacco:    reports that she has quit smoking. Her smoking use included cigarettes. She has a 50.00 pack-year smoking history. She has never used smokeless tobacco.  Alcohol:      reports no history of alcohol use. Drug Use:  reports no history of drug use. Family History:     Family History   Problem Relation Age of Onset    Cancer Mother         uterine    Heart Attack Mother     Heart Attack Father     Other Maternal Grandfather         foot gangrene    Other Paternal Grandmother         bleeding ulcers    Other Paternal Grandfather         lung cancer       Review of Systems:     Positive and Negative as described in HPI.     CONSTITUTIONAL: Positive for fevers, chills, sweats, fatigue, no weight loss  HEENT:  negative for vision, hearing changes, runny nose, throat normal muscle tone and bulk, no abnormal sensation, normal speech, cranial nerves II through XII grossly intact  Skin: No gross lesions, rashes, bruising or bleeding on exposed skin area  Extremities: peripheral pulses palpable, no pedal edema or calf pain with palpation  Psych: Anxious affect    Investigations:      Laboratory Testing:  Recent Results (from the past 24 hour(s))   Culture, Blood 1    Collection Time: 11/23/21  8:40 PM    Specimen: Blood   Result Value Ref Range    Specimen Description . BLOOD     Special Requests RT AC 20 ML     Culture NO GROWTH 10 HOURS    STROKE PANEL    Collection Time: 11/23/21  8:43 PM   Result Value Ref Range    Glucose 132 (H) 70 - 99 mg/dL    BUN 17 8 - 23 mg/dL    CREATININE 1.18 (H) 0.50 - 0.90 mg/dL    Bun/Cre Ratio NOT REPORTED 9 - 20    Calcium 9.0 8.6 - 10.4 mg/dL    Sodium 132 (L) 135 - 144 mmol/L    Potassium 4.2 3.7 - 5.3 mmol/L    Chloride 98 98 - 107 mmol/L    CO2 20 20 - 31 mmol/L    Anion Gap 14 9 - 17 mmol/L    GFR Non-African American 45 (L) >60 mL/min    GFR  54 (L) >60 mL/min    GFR Comment          GFR Staging NOT REPORTED     WBC 8.9 3.5 - 11.3 k/uL    RBC 4.82 3.95 - 5.11 m/uL    Hemoglobin 13.4 11.9 - 15.1 g/dL    Hematocrit 42.2 36.3 - 47.1 %    MCV 87.6 82.6 - 102.9 fL    MCH 27.8 25.2 - 33.5 pg    MCHC 31.8 28.4 - 34.8 g/dL    RDW 16.4 (H) 11.8 - 14.4 %    Platelets 094 703 - 915 k/uL    MPV 11.2 8.1 - 13.5 fL    NRBC Automated 0.0 0.0 per 100 WBC    Total CK 38 26 - 192 U/L    CK-MB 1.4 <5.4 ng/mL    % CKMB 3.7 (H) 0.0 - 3.0 %    CKMB Interpretation NORMAL ISOENZYME PATTERN     Differential Type NOT REPORTED     Seg Neutrophils 82 (H) 36 - 65 %    Lymphocytes 8 (L) 24 - 43 %    Monocytes 9 3 - 12 %    Eosinophils % 0 (L) 1 - 4 %    Basophils 0 0 - 2 %    Immature Granulocytes 1 (H) 0 %    Segs Absolute 7.24 1.50 - 8.10 k/uL    Absolute Lymph # 0.72 (L) 1.10 - 3.70 k/uL    Absolute Mono # 0.80 0.10 - 1.20 k/uL    Absolute Eos # <0.03 0.00 - 0.44 k/uL    Basophils Absolute <0.03 0.00 - 0.20 k/uL    Absolute Immature Granulocyte 0.06 0.00 - 0.30 k/uL    WBC Morphology NOT REPORTED     RBC Morphology ANISOCYTOSIS PRESENT     Platelet Estimate NOT REPORTED     Myoglobin 52 25 - 58 ng/mL    Protime 11.0 9.1 - 12.3 sec    INR 1.0     PTT 27.2 20.5 - 30.5 sec    Troponin, High Sensitivity 29 (H) 0 - 14 ng/L    Troponin T NOT REPORTED <0.03 ng/mL    Troponin Interp NOT REPORTED    Lactate, Sepsis    Collection Time: 11/23/21  8:43 PM   Result Value Ref Range    Lactic Acid, Sepsis NOT REPORTED 0.5 - 1.9 mmol/L    Lactic Acid, Sepsis, Whole Blood 2.3 (H) 0.5 - 1.9 mmol/L   Venous Blood Gas, POC    Collection Time: 11/23/21  9:01 PM   Result Value Ref Range    pH, Nimesh 7.428 7.320 - 7.430    pCO2, Nimesh 38.6 (L) 41.0 - 51.0 mm Hg    pO2, Nimesh 28.0 (L) 30.0 - 50.0 mm Hg    HCO3, Venous 25.5 22.0 - 29.0 mmol/L    Total CO2, Venous NOT REPORTED 23.0 - 30.0 mmol/L    Negative Base Excess, Nimesh NOT REPORTED 0.0 - 2.0    Positive Base Excess, Nimesh 1 0.0 - 3.0    O2 Sat, Nimesh 55 (L) 60.0 - 85.0 %    O2 Device/Flow/% NOT REPORTED     Jeremi Test NOT REPORTED     Sample Site NOT REPORTED     Mode NOT REPORTED     FIO2 NOT REPORTED     Pt Temp NOT REPORTED     POC pH Temp NOT REPORTED     POC pCO2 Temp NOT REPORTED mm Hg    POC pO2 Temp NOT REPORTED mm Hg   ELECTROLYTES PLUS    Collection Time: 11/23/21  9:01 PM   Result Value Ref Range    POC Sodium 135 (L) 138 - 146 mmol/L    POC Potassium 4.4 3.5 - 4.5 mmol/L    POC Chloride 101 98 - 107 mmol/L    POC TCO2 26 22 - 30 mmol/L    Anion Gap 9 7 - 16 mmol/L   Hemoglobin and hematocrit, blood    Collection Time: 11/23/21  9:01 PM   Result Value Ref Range    POC Hemoglobin 13.8 12.0 - 16.0 g/dL    POC Hematocrit 41 36 - 46 %   Creatinine W/GFR Point of Care    Collection Time: 11/23/21  9:01 PM   Result Value Ref Range    POC Creatinine 1.44 (H) 0.51 - 1.19 mg/dL    GFR Comment 43 (L) >60 mL/min    GFR Non- 35 (L) >60 mL/min    GFR Comment         CALCIUM, IONIC (POC)    Collection Time: 11/23/21  9:01 PM   Result Value Ref Range    POC Ionized Calcium 1.18 1.15 - 1.33 mmol/L   POCT urea (BUN)    Collection Time: 11/23/21  9:01 PM   Result Value Ref Range    POC BUN 18 8 - 26 mg/dL   Lactic Acid, POC    Collection Time: 11/23/21  9:01 PM   Result Value Ref Range    POC Lactic Acid 1.51 (H) 0.56 - 1.39 mmol/L   POCT Glucose    Collection Time: 11/23/21  9:01 PM   Result Value Ref Range    POC Glucose 126 (H) 74 - 100 mg/dL   Culture, Blood 1    Collection Time: 11/23/21  9:49 PM    Specimen: Blood   Result Value Ref Range    Specimen Description . BLOOD     Special Requests 13ML R HAND     Culture NO GROWTH 8 HOURS    SARS-CoV-2 NAAT (Rapid)    Collection Time: 11/23/21 10:02 PM    Specimen: Nasopharyngeal Swab   Result Value Ref Range    Specimen Description . NASOPHARYNGEAL SWAB     SARS-CoV-2, Rapid Not Detected Not Detected   Urinalysis, reflex to microscopic    Collection Time: 11/23/21 10:12 PM   Result Value Ref Range    Color, UA Yellow Yellow    Turbidity UA Clear Clear    Glucose, Ur NEGATIVE NEGATIVE    Bilirubin Urine NEGATIVE NEGATIVE    Ketones, Urine NEGATIVE NEGATIVE    Specific Gravity, UA 1.053 (H) 1.005 - 1.030    Urine Hgb TRACE (A) NEGATIVE    pH, UA 7.5 5.0 - 8.0    Protein, UA NEGATIVE NEGATIVE    Urobilinogen, Urine Normal Normal    Nitrite, Urine NEGATIVE NEGATIVE    Leukocyte Esterase, Urine NEGATIVE NEGATIVE    Urinalysis Comments NOT REPORTED    Microscopic Urinalysis    Collection Time: 11/23/21 10:12 PM   Result Value Ref Range    -          WBC, UA None 0 - 5 /HPF    RBC, UA 2 TO 5 0 - 4 /HPF    Casts UA NOT REPORTED 0 - 8 /LPF    Crystals, UA NOT REPORTED None /HPF    Epithelial Cells UA None 0 - 5 /HPF    Renal Epithelial, UA NOT REPORTED 0 /HPF    Bacteria, UA NOT REPORTED None    Mucus, UA NOT REPORTED None    Trichomonas, UA NOT REPORTED None    Amorphous, UA NOT REPORTED None    Other Observations UA NOT REPORTED NOT REQ. Yeast, UA NOT REPORTED None   Comprehensive Metabolic Panel w/ Reflex to MG    Collection Time: 11/24/21  6:31 AM   Result Value Ref Range    Glucose 103 (H) 70 - 99 mg/dL    BUN 13 8 - 23 mg/dL    CREATININE 1.21 (H) 0.50 - 0.90 mg/dL    Bun/Cre Ratio NOT REPORTED 9 - 20    Calcium 8.8 8.6 - 10.4 mg/dL    Sodium 136 135 - 144 mmol/L    Potassium 3.9 3.7 - 5.3 mmol/L    Chloride 102 98 - 107 mmol/L    CO2 24 20 - 31 mmol/L    Anion Gap 10 9 - 17 mmol/L    Alkaline Phosphatase 118 (H) 35 - 104 U/L    ALT 15 5 - 33 U/L    AST 15 <32 U/L    Total Bilirubin 0.39 0.3 - 1.2 mg/dL    Total Protein 6.1 (L) 6.4 - 8.3 g/dL    Albumin 3.4 (L) 3.5 - 5.2 g/dL    Albumin/Globulin Ratio 1.3 1.0 - 2.5    GFR Non-African American 43 (L) >60 mL/min    GFR  52 (L) >60 mL/min    GFR Comment          GFR Staging NOT REPORTED    Lactate, Sepsis    Collection Time: 11/24/21  6:31 AM   Result Value Ref Range    Lactic Acid, Sepsis NOT REPORTED 0.5 - 1.9 mmol/L    Lactic Acid, Sepsis, Whole Blood 0.9 0.5 - 1.9 mmol/L   CBC    Collection Time: 11/24/21  6:31 AM   Result Value Ref Range    WBC 7.9 3.5 - 11.3 k/uL    RBC 4.30 3.95 - 5.11 m/uL    Hemoglobin 12.3 11.9 - 15.1 g/dL    Hematocrit 38.1 36.3 - 47.1 %    MCV 88.6 82.6 - 102.9 fL    MCH 28.6 25.2 - 33.5 pg    MCHC 32.3 28.4 - 34.8 g/dL    RDW 16.8 (H) 11.8 - 14.4 %    Platelets 337 131 - 497 k/uL    MPV 10.8 8.1 - 13.5 fL    NRBC Automated 0.0 0.0 per 100 WBC   Protime-INR    Collection Time: 11/24/21  6:31 AM   Result Value Ref Range    Protime 11.5 9.1 - 12.3 sec    INR 1.1    Troponin    Collection Time: 11/24/21  6:31 AM   Result Value Ref Range    Troponin, High Sensitivity 57 (HH) 0 - 14 ng/L    Troponin T NOT REPORTED <0.03 ng/mL    Troponin Interp NOT REPORTED        Imaging/Diagnostics:  CT Head WO Contrast    Result Date: 11/23/2021  No CT evidence of an acute infarct.  The findings were sent to the Radiology Results Communication Center at 8:54 pm on 11/23/2021to be communicated to a licensed caregiver. CT ABDOMEN PELVIS W IV CONTRAST Additional Contrast? None    Result Date: 11/23/2021  Moderate hiatal hernia with a fluid-filled patulous distal esophagus which is similar to prior comparison exams. Distended stomach with a normal caliber duodenum. Query possible gastroparesis or functional outlet obstruction. Possible findings of proctitis. Correlate clinically. Hepatomegaly with steatosis, unchanged. Findings in the dependent lung bases which are suspicious for sequelae of aspiration pneumonia in the appropriate clinical setting. XR CHEST PORTABLE    Result Date: 11/23/2021  Chronic lung changes with right basilar airspace opacity concerning for underlying pneumonia in the appropriate clinical setting. Correlate with aspiration risk. CTA HEAD NECK W CONTRAST    Result Date: 11/23/2021  No large vessel occlusion visualized within the head or neck. Incidentally noted pulmonary emphysema and dilated, air and fluid-filled esophagus. Assessment :      Hospital Problems           Last Modified POA    * (Principal) Expressive aphasia 11/24/2021 Yes    GERD (gastroesophageal reflux disease) 11/24/2021 Yes    Depression 11/24/2021 Yes    Fever 11/24/2021 Yes    Gastric outlet obstruction 11/24/2021 Yes    Hypertension 11/24/2021 Yes    S/P Nissen fundoplication (without gastrostomy tube) procedure 11/24/2021 Yes          Plan:     Patient status inpatient in the Med/Surge    1. Fever- ? Pneumonia? On CT abdomen questionable bibasilar pneumonia, will obtain CT chest without contrast and check a video swallow study to rule out aspiration. Follow-up blood cultures, urinalysis appears negative. No leukocytosis, started on IV Invanz  2. Expressive aphasia-CT brain negative, CTA head/neck negative for occlusion. Appreciate neurology evaluation. Pt has had this in the past  3.  History gastric outlet obstruction-on Reglan 3 times daily at home, and stool softeners, consult gastroenterology for further input. No bowel obstruction per general surgery. 4. GERD-PPI  5. Hypertension-BP low normal, hold Toprol-XL for now  6. Anxiety/depression-start Ativan p.o. as needed, resume BuSpar and Seroquel and trazodone. Per family, nurse reported that they wished we not hold her psychiatric medications  7. Sips of water with meds  8. DVT prophylaxis  9. PT/OT    Consultations:   IP CONSULT TO STROKE TEAM  IP CONSULT TO GENERAL SURGERY  IP CONSULT TO HOSPITALIST  IP CONSULT TO INFECTIOUS DISEASES  IP CONSULT TO GI     Patient is admitted as inpatient status because of co-morbidities listed above, severity of signs and symptoms as outlined, requirement for current medical therapies and most importantly because of direct risk to patient if care not provided in a hospital setting. Expected length of stay > 48 hours.     Hardy Martinez MD  11/24/2021  11:03 AM    Copy sent to Dr. Bai Borrero MD

## 2021-11-24 NOTE — ED NOTES
Report received from [place] in [address]. Pt tolerated EGD well and will be heading to the floor soon.       Sydni Kaiser, TARA  99/84/[id number]

## 2021-11-24 NOTE — ANESTHESIA PRE PROCEDURE
Department of Anesthesiology  Preprocedure Note       Name:  Leonid Gregorio   Age:  68 y.o.  :  1945                                          MRN:  [id number]         Date:  2021      Surgeon: Meredith Jane):  Javier Montano MD    )    Department of Anesthesiology  Pre-Anesthesia Evaluation/Consultation         Name:  Leonid Gregorio                                         Age:  68 y.o. MRN:  [id number]            Procedure(s):  Procedure: EGD ESOPHAGOGASTRODUODENOSCOPY (N/A )   Anesthesia type: Monitor Anesthesia Care   Pre-op diagnosis: abdominal pain         Medications  No current facility-administered medications for this visit. Current Outpatient Medications   Medication Sig Dispense Refill    bisacodyl (DULCOLAX) 10 MG suppository Place 10 mg rectally daily as needed for Constipation      Sodium Phosphates (FLEET) 7-19 GM/118ML Place 1 enema rectally As needed after no BM for 5 days      clotrimazole-betamethasone (LOTRISONE) 1-0.05 % cream Apply topically 2 times daily Apply topically 2 times daily.  magnesium hydroxide (MILK OF MAGNESIA) 400 MG/5ML suspension Take by mouth daily as needed for Constipation      polyethylene glycol (GLYCOLAX) 17 g packet Take 17 g by mouth every other day      metoclopramide (REGLAN) 5 MG tablet Take 5 mg by mouth 3 times daily (before meals) For nausea      budesonide-formoterol (SYMBICORT) 160-4.5 MCG/ACT AERO Inhale 2 puffs into the lungs 2 times daily      gabapentin (NEURONTIN) 300 MG capsule Take 1 capsule by mouth 3 times daily for 30 days. 90 capsule 0    QUEtiapine (SEROQUEL) 25 MG tablet Take 1 tablet by mouth 2 times daily 60 tablet 3    ferrous sulfate (FE TABS 325) 325 (65 Fe) MG EC tablet Take 1 tablet by mouth daily (with breakfast) 90 tablet 3    furosemide (LASIX) 20 MG tablet Take 1 tablet by mouth daily 60 tablet 3    gabapentin (NEURONTIN) 300 MG capsule Take 1 capsule by mouth 3 times daily for 30 days.  90 capsule 0    clonazePAM (KLONOPIN) 0.5 MG tablet Take 0.5 tablets by mouth 2 times daily for 60 days. Taking 1/2 tablet BID 5 tablet 0    pantoprazole (PROTONIX) 40 MG tablet Take 1 tablet by mouth 2 times daily      amitriptyline (ELAVIL) 25 MG tablet Take 1 tablet by mouth nightly      busPIRone (BUSPAR) 10 MG tablet Take 2 tablets by mouth 3 times daily      metoprolol succinate (TOPROL XL) 25 MG extended release tablet Take 1 tablet by mouth daily 30 tablet 3    docusate (COLACE) 50 MG/5ML liquid 10 mLs by Per G Tube route 2 times daily 100 mL 2    sucralfate (CARAFATE) 1 GM tablet Take 1 tablet by mouth 4 times daily 120 tablet 3    traZODone (DESYREL) 100 MG tablet Take 1 tablet by mouth nightly 30 tablet 0    RA VITAMIN D-3 50 MCG (2000 UT) CAPS take 1 capsule by mouth once daily      Oyster Shell 500 MG TABS Take 500 mg by mouth 2 times daily       calcium carbonate (TUMS) 500 MG chewable tablet Take 1 tablet by mouth 3 times daily as needed for Heartburn      simvastatin (ZOCOR) 40 MG tablet Take 40 mg by mouth nightly.        Facility-Administered Medications Ordered in Other Visits   Medication Dose Route Frequency Provider Last Rate Last Admin    meropenem (MERREM) 1,000 mg in sodium chloride 0.9 % 100 mL IVPB (mini-bag)  1,000 mg IntraVENous Q12H Skip Vora, APRN - CNP 33.3 mL/hr at 11/24/21 1226 1,000 mg at 11/24/21 1226    sodium chloride flush 0.9 % injection 5-40 mL  5-40 mL IntraVENous 2 times per day Skip Vora, APRN - CNP        sodium chloride flush 0.9 % injection 5-40 mL  5-40 mL IntraVENous PRN Skip Vora, APRN - CNP        0.9 % sodium chloride infusion  25 mL IntraVENous PRN Skip Vora, APRN - CNP        enoxaparin (LOVENOX) injection 30 mg  30 mg SubCUTAneous Daily Skip Vora, APRN - CNP   30 mg at 11/24/21 1150    acetaminophen (TYLENOL) tablet 650 mg  650 mg Oral Q6H PRN Skip Vora, APRN - CNP   650 mg at 11/24/21 1191    Or    acetaminophen (TYLENOL) suppository 650 mg  650 mg Rectal Q6H PRN Paula Lowers, APRN - CNP        0.9 % sodium chloride infusion   IntraVENous Continuous Santos Leach  mL/hr at 11/24/21 0819 New Bag at 11/24/21 0819    busPIRone (BUSPAR) tablet 20 mg  20 mg Oral TID Santos Leach MD   20 mg at 11/24/21 1146    LORazepam (ATIVAN) tablet 1 mg  1 mg Oral Q6H PRN Santos Leach MD   1 mg at 11/24/21 1228    amitriptyline (ELAVIL) tablet 25 mg  25 mg Oral Nightly Santos Leach MD        budesonide-formoterol (SYMBICORT) 160-4.5 MCG/ACT inhaler 2 puff  2 puff Inhalation BID Santos Leach MD        calcium carbonate (TUMS) chewable tablet 500 mg  500 mg Oral TID PRN Santos Leach MD        magnesium hydroxide (MILK OF MAGNESIA) 400 MG/5ML suspension 15 mL  15 mL Oral Daily PRN Santos Leach MD        metoclopramide (REGLAN) tablet 5 mg  5 mg Oral TID AC Santos Leach MD   5 mg at 11/24/21 1225    polyethylene glycol (GLYCOLAX) packet 17 g  17 g Oral Every Other Day Santos Leach MD        QUEtiapine (SEROQUEL) tablet 25 mg  25 mg Oral BID Santos Leach MD   25 mg at 11/24/21 1147    atorvastatin (LIPITOR) tablet 40 mg  40 mg Oral Daily Santos Leach MD   40 mg at 11/24/21 1145    sucralfate (CARAFATE) tablet 1 g  1 g Oral 4x Daily Santos Leach MD   1 g at 11/24/21 1243    traZODone (DESYREL) tablet 100 mg  100 mg Oral Nightly Santos Leach MD        morphine injection 2 mg  2 mg IntraVENous Q3H PRN Santos Leach MD   2 mg at 11/24/21 1224    tamsulosin (FLOMAX) capsule 0.4 mg  0.4 mg Oral Daily Santos Leach MD   0.4 mg at 11/24/21 1222       Allergies   Allergen Reactions    Prednisone Anxiety    Compazine [Prochlorperazine Maleate]     Penicillin V Potassium     Penicillins Other (See Comments)     Shock- received meropenem and ceftriaxone wo issues 2020     Patient Active Problem List   Diagnosis    Frequency of urination    Back pain    GERD (gastroesophageal reflux disease)    MVP (mitral valve prolapse)    Depression    Anxiety    [facility]    Dysphagia    Hiatal hernia    History of repair of hiatal hernia    Centrilobular emphysema (HCC)    Esophageal dilatation    Shock ([facility]    Fever    Critical illness polyneuropathy ([facility]    Gastric outlet obstruction    Recurrent UTI    Tracheostomy in place ([facility]    H/O gastric ulcer    Hyperlipidemia    Hypertension    Tobacco abuse    Chronic respiratory failure with hypoxia (HCC)    [facility]    S/P Nissen fundoplication (without gastrostomy tube) procedure    Acute blood loss anemia    Phlebitis after infusion    Esophagitis determined by endoscopy    Expressive aphasia    Transient speech disturbance    Speech disturbance    Coffee ground emesis    Acute cystitis without hematuria    Ulcerative esophagitis    Lower abdominal pain     Past Medical History:   Diagnosis Date    Anxiety     Arthritis     Back pain     Bronchitis     Caffeine use     8 coffee / day    Carpal tunnel syndrome     Cataracts, bilateral     Constipation     Depression     Diarrhea     GERD (gastroesophageal reflux disease)     H/O gastric ulcer     Hyperlipidemia     Hypertension     Mumps     MVP (mitral valve prolapse)     Dr. Tiesha Dyer in May 2019    Recurrent UTI     Dr. Martinez Fossa    Sinusitis     Trache[facility] in place [facility])     Ulcerative esophagitis     Wellness examination     Dr. Deepali Beth seen in Jan 2020     Past Surgical History:   Procedure Laterality Date    APPENDECTOMY      CARPAL TUNNEL RELEASE      CHOLECYSTECTOMY, LAPAROSCOPIC N/A 05/22/2020    XI LAPAROSCOPIC ROBOTIC CHOLECYSTECTOMY, PYLOROPLASTY performed by Aroldo Hayden MD at [facility] ERCP  05/21/2020    with stent insertion, balloon dilation, sphinctereotomy    ERCP  05/21/2020    ** CASE IN OR [facility] GI STAFF** ERCP STENT INSERTION performed by Janis Rosa MD at [facility]    ERCP  05/21/2020    ** CASE IN OR WITH GI STAFF**ERCP SPHINCTER/PAPILLOTOMY performed by Janis Rosa MD at [facility]    ERCP  05/21/2020    ** CASE IN OR WITH GI STAFF**ERCP DILATION BALLOON performed by Janis Rosa MD at [facility]    ERCP N/A 2/8/2021    ERCP STENT REMOVAL performed by Debby Uribe MD at [facility]    ERCP  2/8/2021    ERCP STONE REMOVAL performed by Debby Uribe MD at [address]      patricia IOL    GASTRIC FUNDOPLICATION N/A 63/57/7677    XI LAPAROSCOPIC ROBOTIC HIATAL HERNIA REPAIR, CHERYL FUNDOPLICATION performed by Aaron Michael MD at [facility] N/A 03/27/2020    EGD PEG TUBE PLACEMENT performed by Aaron Michael MD at [facility] N/A 2/8/2021    **CASE IN O.R. W/ GI STAFF - EGD WITH REMOVAL PEG TUBE performed by Debby Uribe MD at [facility]    [address] [address], [facility]. CATH POWER PICC TRIPLE  03/04/2020         HYSTERECTOMY      Abdominal    JOINT REPLACEMENT Right     right hip    OVARY REMOVAL      RHINOPLASTY      TONSILLECTOMY      TOTAL HIP ARTHROPLASTY      TOTAL NEPHRECTOMY      atrophic from infections, age 13   [facility] TRACHEOSTOMY N/A 03/27/2020    **ADD ON **WANTS 10:00AM **TRACHEOTOMY performed by Aaron Michael MD at [facility] 04/02/2020    TRACHEOTOMY EXCHANGE performed by Aaron Michael MD at [facility] 03/17/2020    BEDSIDE EGD ESOPHAGOGASTRODUODENOSCOPY (ICU) performed by Aaron Michael MD at [facility] N/A 05/18/2020    EGD BIOPSY performed by Janis Rosa MD at [facility]  05/18/2020    EGD DILATION BALLOON performed by Janis Rosa MD at [address] [place] [address] 24 11/24/2021    BUN 13 11/24/2021    CREATININE 1.21 11/24/2021    GFRAA 52 11/24/2021    LABGLOM 43 11/24/2021    GLUCOSE 103 11/24/2021    PROT 6.1 11/24/2021    CALCIUM 8.8 11/24/2021    BILITOT 0.39 11/24/2021    ALKPHOS 118 11/24/2021    AST 15 11/24/2021    ALT 15 11/24/2021       BMP    Lab Results   Component Value Date     11/24/2021    K 3.9 11/24/2021     11/24/2021    CO2 24 11/24/2021    BUN 13 11/24/2021    CREATININE 1.21 11/24/2021    CALCIUM 8.8 11/24/2021    GFRAA 52 11/24/2021    LABGLOM 43 11/24/2021    GLUCOSE 103 11/24/2021       POC Testing  Recent Labs     11/23/21 2101   POCGLU 126*   POCNA 135*   POCK 4.4   POCCL 101   POCBUN 18   POCHEMO 13.8   POCHCT 41       Coags    Lab Results   Component Value Date    PROTIME 11.5 11/24/2021    INR 1.1 11/24/2021    APTT 27.2 11/23/2021       HCG (If Applicable) No results found for: PREGTESTUR, PREGSERUM, HCG, HCGQUANT     ABGs No results found for: PHART, PO2ART, TBW3PKS, GMV6ZLR, BEART, Q3IXWYRO     Type & Screen (If Applicable)  No results found for: LABABO, [address]    Radiology (If Applicable)    Cardiac Testing (If Applicable)     EKG (If Applicable) NSST changes          Medications prior to admission:   Prior to Admission medications    Medication Sig Start Date End Date Taking? Authorizing Provider   bisacodyl (DULCOLAX) 10 MG suppository Place 10 mg rectally daily as needed for Constipation    Historical Provider, MD   Sodium Phosphates (FLEET) 7-19 GM/118ML Place 1 enema rectally As needed after no BM for 5 days    Historical Provider, MD   clotrimazole-betamethasone (LOTRISONE) 1-0.05 % cream Apply topically 2 times daily Apply topically 2 times daily.     Historical Provider, MD   magnesium hydroxide (MILK OF MAGNESIA) 400 MG/5ML suspension Take by mouth daily as needed for Constipation    Historical Provider, MD   polyethylene glycol (GLYCOLAX) 17 g packet Take 17 g by mouth every other day    Historical Provider, MD metoclopramide (REGLAN) 5 MG tablet Take 5 mg by mouth 3 times daily (before meals) For nausea    Historical Provider, MD   budesonide-formoterol (SYMBICORT) 160-4.5 MCG/ACT AERO Inhale 2 puffs into the lungs 2 times daily    Historical Provider, MD   gabapentin (NEURONTIN) 300 MG capsule Take 1 capsule by mouth 3 times daily for 30 days. 2/10/21 3/12/21  Howard Gonzalez MD   QUEtiapine (SEROQUEL) 25 MG tablet Take 1 tablet by mouth 2 times daily 12/16/20   MARGOTH Jarvis - NP   ferrous sulfate (FE TABS 325) 325 (65 Fe) MG EC tablet Take 1 tablet by mouth daily (with breakfast) 12/16/20   MARGOTH Jarvis - NP   furosemide (LASIX) 20 MG tablet Take 1 tablet by mouth daily 12/16/20   MARGOTH Jarvis - NP   gabapentin (NEURONTIN) 300 MG capsule Take 1 capsule by mouth 3 times daily for 30 days. 12/16/20 9/10/21  MARGOTH Jarvis NP   clonazePAM (KLONOPIN) 0.5 MG tablet Take 0.5 tablets by mouth 2 times daily for 60 days.  Taking 1/2 tablet BID 7/7/20 9/10/21  Lazarus Moats, MD   pantoprazole (PROTONIX) 40 MG tablet Take 1 tablet by mouth 2 times daily 7/7/20   Lazarus Moats, MD   amitriptyline (ELAVIL) 25 MG tablet Take 1 tablet by mouth nightly 7/14/20   Lazarus Moats, MD   busPIRone (BUSPAR) 10 MG tablet Take 2 tablets by mouth 3 times daily 7/14/20   Lazarus Moats, MD   metoprolol succinate (TOPROL XL) 25 MG extended release tablet Take 1 tablet by mouth daily 5/30/20   Genet Gonzalez MD   docusate (COLACE) 50 MG/5ML liquid 10 mLs by Per G Tube route 2 times daily 4/6/20   Tamia Yeung MD   sucralfate (CARAFATE) 1 GM tablet Take 1 tablet by mouth 4 times daily 4/6/20   Tamia Yeung MD   traZODone (DESYREL) 100 MG tablet Take 1 tablet by mouth nightly 4/6/20   Tamia Yeung MD   RA VITAMIN D-3 50 MCG (2000 UT) CAPS take 1 capsule by mouth once daily 2/14/20   Historical Provider, MD   Oyster Shell 500 MG TABS Take 500 mg by mouth 2 times daily     Historical Provider, MD   calcium carbonate (TUMS) 500 MG chewable tablet Take 1 tablet by mouth 3 times daily as needed for Heartburn    Historical Provider, MD   simvastatin (ZOCOR) 40 MG tablet Take 40 mg by mouth nightly. Historical Provider, MD       Current medications:    No current facility-administered medications for this visit. Current Outpatient Medications   Medication Sig Dispense Refill    bisacodyl (DULCOLAX) 10 MG suppository Place 10 mg rectally daily as needed for Constipation      Sodium Phosphates (FLEET) 7-19 GM/118ML Place 1 enema rectally As needed after no BM for 5 days      clotrimazole-betamethasone (LOTRISONE) 1-0.05 % cream Apply topically 2 times daily Apply topically 2 times daily.  magnesium hydroxide (MILK OF MAGNESIA) 400 MG/5ML suspension Take by mouth daily as needed for Constipation      polyethylene glycol (GLYCOLAX) 17 g packet Take 17 g by mouth every other day      metoclopramide (REGLAN) 5 MG tablet Take 5 mg by mouth 3 times daily (before meals) For nausea      budesonide-formoterol (SYMBICORT) 160-4.5 MCG/ACT AERO Inhale 2 puffs into the lungs 2 times daily      gabapentin (NEURONTIN) 300 MG capsule Take 1 capsule by mouth 3 times daily for 30 days. 90 capsule 0    QUEtiapine (SEROQUEL) 25 MG tablet Take 1 tablet by mouth 2 times daily 60 tablet 3    ferrous sulfate (FE TABS 325) 325 (65 Fe) MG EC tablet Take 1 tablet by mouth daily (with breakfast) 90 tablet 3    furosemide (LASIX) 20 MG tablet Take 1 tablet by mouth daily 60 tablet 3    gabapentin (NEURONTIN) 300 MG capsule Take 1 capsule by mouth 3 times daily for 30 days. 90 capsule 0    clonazePAM (KLONOPIN) 0.5 MG tablet Take 0.5 tablets by mouth 2 times daily for 60 days.  Taking 1/2 tablet BID 5 tablet 0    pantoprazole (PROTONIX) 40 MG tablet Take 1 tablet by mouth 2 times daily      amitriptyline (ELAVIL) 25 MG tablet Take 1 tablet by mouth nightly      busPIRone (BUSPAR) 10 MG tablet Take 2 tablets by mouth 3 times daily      metoprolol succinate (TOPROL XL) 25 MG extended release tablet Take 1 tablet by mouth daily 30 tablet 3    docusate (COLACE) 50 MG/5ML liquid 10 mLs by Per G Tube route 2 times daily 100 mL 2    sucralfate (CARAFATE) 1 GM tablet Take 1 tablet by mouth 4 times daily 120 tablet 3    traZODone (DESYREL) 100 MG tablet Take 1 tablet by mouth nightly 30 tablet 0    RA VITAMIN D-3 50 MCG (2000 UT) CAPS take 1 capsule by mouth once daily      Oyster Shell 500 MG TABS Take 500 mg by mouth 2 times daily       calcium carbonate (TUMS) 500 MG chewable tablet Take 1 tablet by mouth 3 times daily as needed for Heartburn      simvastatin (ZOCOR) 40 MG tablet Take 40 mg by mouth nightly.        Facility-Administered Medications Ordered in Other Visits   Medication Dose Route Frequency Provider Last Rate Last Admin    meropenem (MERREM) 1,000 mg in sodium chloride 0.9 % 100 mL IVPB (mini-bag)  1,000 mg IntraVENous Q12H MARGOTH Torres - CNP 33.3 mL/hr at 11/24/21 1226 1,000 mg at 11/24/21 1226    sodium chloride flush 0.9 % injection 5-40 mL  5-40 mL IntraVENous 2 times per day Jonathan Michelle APRN - CNP        sodium chloride flush 0.9 % injection 5-40 mL  5-40 mL IntraVENous PRN Jonathan Michelle, APRN - CNP        0.9 % sodium chloride infusion  25 mL IntraVENous PRN Jonathan Michelle, APRN - CNP        enoxaparin (LOVENOX) injection 30 mg  30 mg SubCUTAneous Daily Jonathan Michelle APRN - CNP   30 mg at 11/24/21 1150    acetaminophen (TYLENOL) tablet 650 mg  650 mg Oral Q6H PRN Jonathan Michelle, APRN - CNP   650 mg at 11/24/21 7021    Or    acetaminophen (TYLENOL) suppository 650 mg  650 mg Rectal Q6H PRN Jonathan Michelle, APRN - CNP        0.9 % sodium chloride infusion   IntraVENous Continuous Kaycee Espinal  mL/hr at 11/24/21 0819 New Bag at 11/24/21 0819    busPIRone (BUSPAR) tablet 20 mg  20 mg Oral TID Kaycee Espinal MD   20 mg at 11/24/21 1146    LORazepam (ATIVAN) tablet 1 mg  1 mg Oral Q6H PRN Angélica Blankenship MD   1 mg at 11/24/21 1228    amitriptyline (ELAVIL) tablet 25 mg  25 mg Oral Nightly Angélica Blankenship MD        budesonide-formoterol (SYMBICORT) 160-4.5 MCG/ACT inhaler 2 puff  2 puff Inhalation BID Angélica Blankenship MD        calcium carbonate (TUMS) chewable tablet 500 mg  500 mg Oral TID PRN Angélica Blankenship MD        magnesium hydroxide (MILK OF MAGNESIA) 400 MG/5ML suspension 15 mL  15 mL Oral Daily PRN Angélica Blankenship MD        metoclopramide (REGLAN) tablet 5 mg  5 mg Oral TID AC Shannen Lewis MD   5 mg at 11/24/21 1225    polyethylene glycol (GLYCOLAX) packet 17 g  17 g Oral Every Other Day Angélica Blankenship MD        QUEtiapine (SEROQUEL) tablet 25 mg  25 mg Oral BID Angélica Blankenship MD   25 mg at 11/24/21 1147    atorvastatin (LIPITOR) tablet 40 mg  40 mg Oral Daily Angélica Blankenship MD   40 mg at 11/24/21 1145    sucralfate (CARAFATE) tablet 1 g  1 g Oral 4x Daily Angélica Blankenship MD   1 g at 11/24/21 1243    traZODone (DESYREL) tablet 100 mg  100 mg Oral Nightly Angélica Blankenship MD        morphine injection 2 mg  2 mg IntraVENous Q3H PRN Angélica Blankenship MD   2 mg at 11/24/21 1224    tamsulosin (FLOMAX) capsule 0.4 mg  0.4 mg Oral Daily Angélica Blankenship MD   0.4 mg at 11/24/21 1222       Allergies: Allergies   Allergen Reactions    Prednisone Anxiety    Compazine [Prochlorperazine Maleate]     Penicillin V Potassium     Penicillins Other (See Comments)     Shock- received meropenem and ceftriaxone wo issues 2020       Problem List:    Patient Active Problem List   Diagnosis Code    Frequency of urination R35.0    Back pain M54.9    GERD (gastroesophageal reflux disease) K21.9    MVP (mitral valve prolapse) I34.1    Depression F32. A    Anxiety F41.9    BENEDICT (acute kidney injury) ([facility] N17.9    Dysphagia R13.10    Hiatal hernia K44.9    History of repair of hiatal hernia Z98.890, Z87.19    Centrilobular emphysema ([facility] J43.2    Esophageal dilatation K22.89    Shock ([facility] R57.9    Fever R50.9    Critical illness polyneuropathy (HCC) G62.81    Gastric outlet obstruction K31.1    Recurrent UTI N39.0    Tracheostomy in place ([facility] Z93.0    H/O gastric ulcer Z87.11    Hyperlipidemia E78.5    Hypertension I10    Tobacco abuse Z72.0    Chronic respiratory failure with hypoxia (HCC) J96.11    S/P percutaneous endoscopic gastrostomy (PEG) tube placement (HCC) Z93.1    S/P Nissen fundoplication (without gastrostomy tube) procedure Z98.890    Acute blood loss anemia D62    Phlebitis after infusion T80. 1XXA, I80.9    Esophagitis determined by endoscopy K20.90    Expressive aphasia R47.01    Transient speech disturbance R47.9    Speech disturbance R47.9    Coffee ground emesis K92.0    Acute cystitis without hematuria N30.00    Ulcerative esophagitis K22.10    Lower abdominal pain R10.30       Past Medical History:        Diagnosis Date    Anxiety     Arthritis     Back pain     Bronchitis     Caffeine use     8 coffee / day    Carpal tunnel syndrome     Cataracts, bilateral     Constipation     Depression     Diarrhea     GERD (gastroesophageal reflux disease)     H/O gastric ulcer     Hyperlipidemia     Hypertension     Mumps     MVP (mitral valve prolapse)     Dr. Sarah Augustin in May 2019    Recurrent UTI     Dr. Richard Parents    Sinusitis     Tracheostomy in place [facility])     Ulcerative esophagitis     Wellness examination     Dr. Lenora Boyce seen in Jan 2020       Past Surgical History:        Procedure Laterality Date    APPENDECTOMY      CARPAL TUNNEL RELEASE      CHOLECYSTECTOMY, LAPAROSCOPIC N/A 05/22/2020    XI LAPAROSCOPIC ROBOTIC CHOLECYSTECTOMY, PYLOROPLASTY performed by Renny Cox MD at [facility] ERCP  05/21/2020    with stent insertion, balloon dilation, sphinctereotomy    ERCP  05/21/2020    ** CASE IN OR [facility] GI STAFF** ERCP STENT INSERTION [facility]    ERCP  05/21/2020    ** CASE IN OR WITH GI STAFF**ERCP SPHINCTER/PAPILLOTOMY performed [facility] MD at [facility]    ERCP  05/21/2020    ** CASE IN OR WITH GI STAFF**ERCP DILATION BALLOON performed [facility]it Kn[facility]    ERCP N/A 2/8/2021    ERCP STENT REMOVAL performed by Joey Wiley MD at [facility]    ERCP  2/8/2021    ERCP STONE REMOVAL performed by Joey Wiley MD at [address]      patricia IOL    GASTRIC FUNDOPLICATION N/A 42/91/3897    XI LAPAROSCOPIC ROBOTIC HIATAL HERNIA REPAIR, CHERYL FUNDOPLICATION performed by Mary Cevallos MD at [facility] N/A 03/27/2020    EGD PEG TUBE PLACEMENT performed by Mary Cevallos MD at [facility] N/A 2/8/2021    **CASE IN O.R. W/ GI STAFF - EGD WITH REMOVAL PEG TUBE performed by Joey Wiley MD at [address], [facility]. CATH POWER PICC TRIPLE  03/04/2020         HYSTERECTOMY      Abdominal    JOINT REPLACEMENT Right     right hip    OVARY REMOVAL      RHINOPLASTY      TONSILLECTOMY      TOTAL HIP ARTHROPLASTY      TOTAL NEPHRECTOMY      atrophic from infections, age 13   [facility] TRACHEOSTOMY N/A 03/27/2020    **ADD ON **WANTS 10:00AM **TRACHEOTOMY performed by Mary Cevallos MD at [facility] 04/02/2020    TRACHEOTOMY EXCHANGE performed by Mary Cevallos MD at [address] 03/17/2020    BEDSIDE EGD ESOPHAGOGASTRODUODENOSCOPY (ICU) performed by Mary Cevallos MD at [address] [place] [address] N/A 05/18/2020    EGD BIOPSY performed by Ranjit Knapp MD at [address] [place] [address]  05/18/2020    EGD DILATION BALLOON performed by Ranjit Knapp MD at [address] [place] [address] N/A 07/06/2020    ** CASE IN O.R. WITH GI STAFF** EGD ESOPHAGOGASTRODUODENOSCOPY performed by Nicole Fragoso MD at [address] [place] [address] N/A 11/09/2020    EGD DIAGNOSTIC ONLY performed by Reji Gonzalez MD at [address] [place] [address]  02/08/2021    - EGD WITH REMOVAL PEG TUBE,ERCP STENT REMOVAL,ERCP STONE REMOVAL       Social History:    Social History     Tobacco Use    Smoking status: Former Smoker     Packs/day: 1.00     Years: 50.00     Pack years: 50.00     Types: Cigarettes    Smokeless tobacco: Never Used    Tobacco comment: quit 1 year ago    Substance Use Topics    Alcohol use: No                                Counseling given: Not Answered  Comment: quit 1 year ago       Vital Signs (Current): There were no vitals filed for this visit.                                            BP Readings from Last 3 Encounters:   11/24/21 111/65   09/15/21 (!) 96/57   09/07/21 122/77       NPO Status:  MN     Vomited blood yesterday                                                                          BMI:   Wt Readings from Last 3 Encounters:   11/23/21 170 lb (77.1 kg)   09/14/21 181 lb 7 oz (82.3 kg)   02/10/21 186 lb 8.2 oz (84.6 kg)     There is no height or weight on file to calculate BMI.    CBC:   Lab Results   Component Value Date    WBC 7.9 11/24/2021    RBC 4.30 11/24/2021    HGB 12.3 11/24/2021    HCT 38.1 11/24/2021    MCV 88.6 11/24/2021    RDW 16.8 11/24/2021     11/24/2021       CMP:   Lab Results   Component Value Date     11/24/2021    K 3.9 11/24/2021     11/24/2021    CO2 24 11/24/2021    BUN 13 11/24/2021    CREATININE 1.21 11/24/2021    GFRAA 52 11/24/2021    LABGLOM 43 11/24/2021    GLUCOSE 103 11/24/2021    PROT 6.1 11/24/2021    CALCIUM 8.8 11/24/2021    BILITOT 0.39 11/24/2021    ALKPHOS 118 11/24/2021    AST 15 11/24/2021    ALT 15 11/24/2021       POC Tests:   Recent Labs     11/23/21 2101   POCGLU 126*   POCNA 135*   POCK 4.4   POCCL 101   POCBUN 18   POCHEMO 13.8   POCHCT 41       Coags:   Lab Results   Component Value Date    PROTIME 11.5 11/24/2021    INR 1.1 11/24/2021    APTT 27.2 11/23/2021       HCG (If Applicable): No results found for: PREGTESTUR, PREGSERUM, HCG, HCGQUANT     ABGs: No results found for: PHART, PO2ART, YLF9YIF, ZML0PTA, BEART, P4ACZGKG     Type & Screen (If Applicable):  No results found for: LABABO, [address]    Anesthesia Evaluation  Patient summary reviewed no history of anesthetic complications:   Airway: Mallampati: III  TM distance: >3 FB   Neck ROM: full  Comment: Trach in place  Mouth opening: > = 3 FB Dental:    (+) partials and upper dentures  Comment: Very poor dentition     Pulmonary:normal exam    (+) pneumonia: no interval change,  COPD: moderate and severe,  current smoker                          ROS comment: Trach removed months ago   Cardiovascular:    (+) hypertension: no interval change,       ECG reviewed  Rhythm: regular  Rate: normal  Echocardiogram reviewed         Beta Blocker:  Not on Beta Blocker      ROS comment: Left ventricle is normal in size, increased septal wall thickness, global  left ventricular systolic function is hyperdynamic, calculated ejection  fraction is 68%. Mild mitral regurgitation. Mild tricuspid regurgitation. Estimated right ventricular systolic pressure is 45 mmHg, suggesting  pulmonary HTN. No significant pericardial effusion is seen.  -cp,syn,pnd     Neuro/Psych:   (+) neuromuscular disease:, psychiatric history:depression/anxiety              ROS comment: neuropathy GI/Hepatic/Renal:   (+) hiatal hernia, GERD: no interval change, PUD,          ROS comment: persistant vomiting,GI bleed. Endo/Other: Negative Endo/Other ROS   (+) blood dyscrasia: anemia:., .                 Abdominal:             Vascular: Other Findings: trach              Anesthesia Plan      MAC     ASA 4     (Asa 4  S/P trach removal, may need smaller diameter ETT)  Induction: intravenous.       Anesthetic plan and risks discussed with patient. Use of blood products discussed with patient whom consented to blood products. Plan discussed with CRNA. Normal sinus rhythm  Nonspecific T wave abnormality  Abnormal ECG  When compared with ECG of 10-SEP-2021 09:51,  QT has shortened      Specimen Collected: 09/10/21 14:58 Last Resulted: 09/11/21 13:11               CONCLUSIONS     Summary  Left ventricle is normal in size. Global left ventricular systolic function  is normal. Calculated EF via heart model is 56 %. Mild left ventricular hypertrophy. Grade I (mild) left ventricular diastolic dysfunction. Negative bubble study, no shunt noted via injection of agitated saline. Mild mitral regurgitation. Mild to moderate tricuspid regurgitation. Estimated right ventricular  systolic pressure is 38 mmHg. Trivial pulmonic insufficiency.     Elveria Osler, MD   11/24/2021

## 2021-11-24 NOTE — OP NOTE
Operative Note      Patient: Ijeoma Dunbar  YOB: 1945  MRN: [id number]    Date of Procedure: 11/24/2021    Pre-Op Diagnosis: abdominal pain    Post-Op Diagnosis: Same   · Tortuous esophagus with dilated distal third. · Evidence of prior fundoplication which appeared to be partially undone  · Sarah-Abrams tear. · Severely congested and inflamed appearing proximal stomach  · Normal distal stomach  · Normal examined duodenum        Procedure(s):  EGD BIOPSY    Surgeon(s):  Bria Hudson MD    Assistant:   First Assistant: Esmer Hodges RN; Milad Brooks RN    Anesthesia: Monitor Anesthesia Care    Estimated Blood Loss (mL): Minimal    Complications: None    Specimens:   ID Type Source Tests Collected by Time Destination   A : Proximal stomach Bx's Tissue Stomach [facility] MD 11/24/2021 1640        Implants:  * No implants in log *      Drains:   Urethral Catheter Non-latex 12 fr (Active)   Catheter Indications Urinary retention (acute or chronic), continuous bladder irrigation or bladder outlet obstruction 11/24/21 0926       [REMOVED] Gastrostomy/Enterostomy/Jejunostomy Percutaneous Endoscopic Gastrostomy (PEG) LLQ 1 20 fr (Removed)       [REMOVED] External Urinary Catheter (Removed)       Findings:   1. The esophagus was tortuous with dilated distal third. 2. The GE junction was noted at 38 cm. 3. The GE junction compliant to the passage of scope without evidence of stricture or stenosis. 4. Mild reflux esophagitis was noted at the GE junction. 5. Sarah-Abrams tear. 6. The mucosa in the fundus, cardia and proximal gastric body appeared severely congested, inflamed with diffuse erythema, and granularity. Biopsies were performed for histology. 7. The distal gastric body antrum and pylorus appeared normal.  8. The examined duodenum appeared normal.    Recommendations:  1. Await pathology results.   2. Continue IV pantoprazole 40 mg twice daily for 2 days then change to oral once daily thereafter. 3. Start full liquid diet and advance as tolerated. 4. Patient's nausea vomiting most likely due to infectious (viral) vs medication gastritis. This will resolve with supportive care and antiemetics. 5. Gastric distention noted on CT is most likely due to intractable nausea vomiting. Due to recurrent nausea vomiting. 6. Continue supportive care. Once patient is able to tolerate diet she can be discharged home. 7. Patient to follow-up with primary GI: Dr Tenisha Alvarez    Detailed Description of Procedure:   Informed consent was obtained from the patient after explanation of the procedure including indications, description of the procedure,  benefits and possible risks and complications of the procedure, and alternatives. Questions were answered. The patient's history was reviewed and a directed physical examination was performed prior to the procedure. Patient was monitored throughout the procedure with pulse oximetry and periodic assessment of vital signs. Patient was sedated as noted above. With the patient in the left lateral decubitus position, the Olympus videoendoscope was placed in the patient's mouth and under direct visualization passed into the esophagus. Visualization of the esophagus, stomach, and duodenum was performed during both introduction and withdrawal of the endoscope and retroflexed view of the proximal stomach was obtained. The scope was passed to the 3rd portion of the duodenum. The patient tolerated the procedure well and was taken to the recovery area in good condition. The patient  was taken to the recovery area in good condition.     Electronically signed by Javier Montano MD on 11/24/2021 at 4:57 PM

## 2021-11-24 NOTE — ED PROVIDER NOTES
Faculty Sign-Out Attestation  Handoff taken on the following patient from prior Attending Physician: Norm Thao    I was available and discussed any additional care issues that arose and coordinated the management plans with the resident(s) caring for the patient during my duty period. Any areas of disagreement with residents documentation of care or procedures are noted on the chart. I was personally present for the key portions of any/all procedures during my duty period. I have documented in the chart those procedures where I was not present during the key portions.     Abdominal pain, expressive aphasia, admitting,     Pasha Mendoza DO  Attending Physician     Pasha Mendoza DO  11/24/21 0004

## 2021-11-24 NOTE — PROGRESS NOTES
The patient is a 43-year-old female who has an extremely complicated past medical and surgical history including a Mark fundoplication in February 2020 with subsequent extended hospitalization, tracheostomy and percutaneous endoscopic gastrostomy tube placement which has since been removed. The patient returns to the emergency department with altered mental status, aphasia and abdominal pain. The patient reports inconsistencies in her history regarding her abdominal pain, specifically with regard to the duration and location of the abdominal pain. Most recently, the patient states that for the past several weeks she has been having abdominal pain, nausea, vomiting and associated weight loss. Imaging performed demonstrates recurrence of her hiatal hernia. At this time, the patient's physical exam is benign. Gastroenterology has been consulted and plans to proceed with EGD. Presently, there is no plan for urgent surgical intervention. Based on the patient's complex surgical history, further evaluations and interventions will be directed towards the Bariatric and Minimally Invasive Surgery team.    Patient and plan discussed with Shanel Drummond and Alejandra Rogers. Acute Care Surgery to sign off.     Thank you,  Dulce Maria Lee MD  General Surgery PGY4

## 2021-11-24 NOTE — PROGRESS NOTES
Pharmacy Note     Renal Dose Adjustment    Nicole Morocho is a 68 y.o. female. Pharmacist assessment of renally cleared medications. Recent Labs     11/23/21 2043   BUN 17       Recent Labs     11/23/21 2043 11/23/21  2101   CREATININE 1.18* 1.44*       Estimated Creatinine Clearance: 32 mL/min (A) (based on SCr of 1.44 mg/dL (H)). Estimated CrCl using Ideal Body Weight: 26.3 mL/min (based on IBW 50.12 kg)    Height:   Ht Readings from Last 1 Encounters:   11/23/21 5' 2\" (1.575 m)     Weight:  Wt Readings from Last 1 Encounters:   11/23/21 170 lb (77.1 kg)       The following medication dose has been adjusted based upon renal function per P&T Guidelines:             Enoxaparin 40 mg subcutaneously once daily changed to enoxaparin 30 mg subcutaneously once daily.     Jacqui Rodriguez [address]  11/24/2021 5:24 AM

## 2021-11-24 NOTE — FLOWSHEET NOTE
[facility] [address]     Emergency/Trauma Note    PATIENT NAME: Joy Ku    Shift date: 11/24/2021  Shift day: Tuesday   Shift # 2    Room # 07/07   Name: Joy Ku            Age: 68 y.o. Gender: female          Anabaptist: [employer]   Place of [employer]:     Trauma/Incident type: Stroke Alert  Admit Date & Time: 11/23/2021  8:12 PM  TRAUMA NAME: Same as pt. ADVANCE DIRECTIVES IN CHART? No    NAME OF DECISION MAKER: Pt.    RELATIONSHIP OF DECISION MAKER TO PATIENT:  N/A    PATIENT/EVENT DESCRIPTION:  Joy Ku is a 68 y.o. female who arrived via ambulance from home as an ED stroke alert. Pt. Suffering from stroke like symptoms. . Pt to be admitted to 07/07. SPIRITUAL ASSESSMENT/INTERVENTION:  Pt. Was tearful and frightened.  provided prayer at bedside, emotional support and companionship. No family members were currently available. PATIENT BELONGINGS:  With patient    ANY BELONGINGS OF SIGNIFICANT VALUE NOTED:  None,  did not handle any belongings. REGISTRATION STAFF NOTIFIED? No      WHAT IS YOUR SPIRITUAL CARE PLAN FOR THIS PATIENT?:   Provide spiritual support for pt. And family during stay at [facility]). Electronically signed by Nika Matthew on 11/24/2021 at 12:04 AM.  [facility]  [phone]     11/24/21 0002   Encounter Summary   Services provided to: Patient   Referral/Consult From: Multi-disciplinary team   Support System Unknown   Place of [address] [place] [address] No   Continue Visiting   (11/24/2021)   Complexity of Encounter High   Length of Encounter 1 hour   Spiritual Assessment Completed Yes   Crisis   Type Stroke Alert   Assessment Tearful; Anxious;  Fearful   Intervention Active listening; Explored feelings, thoughts, concerns; Explored coping resources; Nurtured hope; Prayer; Sustaining presence/ Ministry of presence   Outcome Comfort; Expressed gratitude; Coping; Tearful

## 2021-11-24 NOTE — ED PROVIDER NOTES
[facility]  ED  Emergency Department Encounter  Emergency Medicine Resident     Pt Name: Carolee Berry  MRN: [id number]  [place] 1945  Date of evaluation: 11/23/21  PCP:  Best Trinh MD    [address] [place] [address]       Chief Complaint   Patient presents with    Abdominal Pain    Altered Mental Status       HISTORY OFPRESENT ILLNESS  (Location/Symptom, Timing/Onset, Context/Setting, Quality, Duration, Modifying Factors,Severity.)      Carolee Beryr is a 68 y. o.yo female who presents with speech abnormality and abdominal pain. Patient here with intermittent aphasia, NIH of 0 on presentation, no chest pain or shortness of breath, moving all extremities, speaking baseline at this time. Denies chest pain or shortness of breath but continues to say she has lower abdominal pain. States the pain is 5 out of 10 in severity worse when you push on it, denies any urinary symptoms, vaginal symptoms back pain. States no headache or vision changes. States that she feels weak all over    PAST MEDICAL / SURGICAL / SOCIAL / FAMILY HISTORY      has a past medical history of Anxiety, Arthritis, Back pain, Bronchitis, Caffeine use, Carpal tunnel syndrome, Cataracts, bilateral, Constipation, Depression, Diarrhea, GERD (gastroesophageal reflux disease), H/O gastric ulcer, Hyperlipidemia, Hypertension, Mumps, MVP (mitral valve prolapse), Recurrent UTI, Sinusitis, Tracheostomy in place [facility]), Ulcerative esophagitis, and Wellness examination. has a past surgical history that includes Hysterectomy; total nephrectomy; Tonsillectomy; Appendectomy; Cystoscopy; Ovary removal; Tubal ligation; rhinoplasty; Carpal tunnel release; Hand surgery; Total hip arthroplasty; eye surgery; Colonoscopy; joint replacement (Right); Gastric fundoplication (N/A, 07/39/3702); hc cath power picc triple (03/04/2020); Upper gastrointestinal endoscopy (N/A, 03/17/2020); tracheostomy (N/A, 03/27/2020);  Gastrostomy tube placement (N/A, 03/27/2020); tracheostomy (N/A, 04/02/2020); Upper gastrointestinal endoscopy (N/A, 05/18/2020); Upper gastrointestinal endoscopy (05/18/2020); ERCP (05/21/2020); ERCP (05/21/2020); ERCP (05/21/2020); ERCP (05/21/2020); Cholecystectomy, laparoscopic (N/A, 05/22/2020); Upper gastrointestinal endoscopy (N/A, 07/06/2020); Upper gastrointestinal endoscopy (N/A, 11/09/2020); Upper gastrointestinal endoscopy (02/08/2021); Gastrostomy tube placement (N/A, 2/8/2021); ERCP (N/A, 2/8/2021); and ERCP (2/8/2021). Social History     Socioeconomic History    Marital status:      Spouse name: Not on file    Number of children: 2    Years of education: Not on file    Highest education level: Not on file   Occupational History    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   Tobacco Use    Smoking status: Former Smoker     Packs/day: 1.00     Years: 50.00     Pack years: 50.00     Types: Cigarettes    Smokeless tobacco: Never Used    Tobacco comment: quit 1 year ago    Vaping Use    Vaping Use: Former    Substances: Always   Substance and Sexual Activity    Alcohol use: No    Drug use: No    Sexual activity: Never   Other Topics Concern    Not on file   Social History Narrative    Not on file     Social Determinants of Health     Financial Resource Strain:     Difficulty of Paying Living Expenses: Not on file   Food Insecurity:     Worried About [address] in the Last Year: Not on file    [address] in the Last Year: Not on file   Transportation Needs:     Lack of Transportation (Medical): Not on file    Lack of Transportation (Non-Medical):  Not on file   Physical Activity:     Days of Exercise per Week: Not on file    Minutes of Exercise per Session: Not on file   Stress:     Feeling of Stress : Not on file   Social Connections:     Frequency of Communication with Friends and Family: Not on file    Frequency of Social Gatherings with Friends and Family: Not on file    Attends Church Services: Not on file   [facility] [employer] or Organizations: Not on file    Attends Club or Organization Meetings: Not on file    Marital Status: Not on file   Intimate Partner Violence:     Fear of Current or Ex-Partner: Not on file    Emotionally Abused: Not on file    Physically Abused: Not on file    Sexually Abused: Not on file   Housing Stability:     Unable to Pay for Housing in the Last Year: Not on file    Number of [place] in the Last Year: Not on file    Unstable Housing in the Last Year: Not on file       Family History   Problem Relation Age of Onset    Cancer Mother         uterine    Heart Attack Mother     Heart Attack Father     Other Maternal Grandfather         foot gangrene    Other Paternal Grandmother         bleeding ulcers    Other Paternal Grandfather         lung cancer        Allergies:  Prednisone, Compazine [prochlorperazine maleate], Penicillin v potassium, and Penicillins    Home Medications:  Prior to Admission medications    Medication Sig Start Date End Date Taking? Authorizing Provider   bisacodyl (DULCOLAX) 10 MG suppository Place 10 mg rectally daily as needed for Constipation    Historical Provider, MD   Sodium Phosphates (FLEET) 7-19 GM/118ML Place 1 enema rectally As needed after no BM for 5 days    Historical Provider, MD   clotrimazole-betamethasone (LOTRISONE) 1-0.05 % cream Apply topically 2 times daily Apply topically 2 times daily.     Historical Provider, MD   magnesium hydroxide (MILK OF MAGNESIA) 400 MG/5ML suspension Take by mouth daily as needed for Constipation    Historical Provider, MD   polyethylene glycol (GLYCOLAX) 17 g packet Take 17 g by mouth every other day    Historical Provider, MD   metoclopramide (REGLAN) 5 MG tablet Take 5 mg by mouth 3 times daily (before meals) For nausea    Historical Provider, MD   budesonide-formoterol (SYMBICORT) 160-4.5 MCG/ACT AERO Inhale 2 puffs into the lungs 2 times daily    Historical Provider, MD   gabapentin (NEURONTIN) 300 MG capsule Take 1 capsule by mouth 3 times daily for 30 days. 2/10/21 3/12/21  Howard Green MD   QUEtiapine (SEROQUEL) 25 MG tablet Take 1 tablet by mouth 2 times daily 12/16/20   Angelo Juan APRN - NP   ferrous sulfate (FE TABS 325) 325 (65 Fe) MG EC tablet Take 1 tablet by mouth daily (with breakfast) 12/16/20   Angelo Juan APRN - NP   furosemide (LASIX) 20 MG tablet Take 1 tablet by mouth daily 12/16/20   Angelo Juan APRN - NP   gabapentin (NEURONTIN) 300 MG capsule Take 1 capsule by mouth 3 times daily for 30 days. 12/16/20 9/10/21  MARGOTH Rossi - NP   clonazePAM (KLONOPIN) 0.5 MG tablet Take 0.5 tablets by mouth 2 times daily for 60 days. Taking 1/2 tablet BID 7/7/20 9/10/21  Partha Napoles MD   pantoprazole (PROTONIX) 40 MG tablet Take 1 tablet by mouth 2 times daily 7/7/20   Partha Napoles MD   amitriptyline (ELAVIL) 25 MG tablet Take 1 tablet by mouth nightly 7/14/20   Partha Napoles MD   busPIRone (BUSPAR) 10 MG tablet Take 2 tablets by mouth 3 times daily 7/14/20   Partha Napoles MD   metoprolol succinate (TOPROL XL) 25 MG extended release tablet Take 1 tablet by mouth daily 5/30/20   Huong German MD   docusate (COLACE) 50 MG/5ML liquid 10 mLs by Per G Tube route 2 times daily 4/6/20   Nicole Lemus MD   sucralfate (CARAFATE) 1 GM tablet Take 1 tablet by mouth 4 times daily 4/6/20   Nicole Lemus MD   traZODone (DESYREL) 100 MG tablet Take 1 tablet by mouth nightly 4/6/20   Nicoel Lemus MD RA VITAMIN D-3 50 MCG (2000 UT) CAPS take 1 capsule by mouth once daily 2/14/20   Historical Provider, MD   Oyster Shell 500 MG TABS Take 500 mg by mouth 2 times daily     Historical Provider, MD   calcium carbonate (TUMS) 500 MG chewable tablet Take 1 tablet by mouth 3 times daily as needed for Heartburn    Historical Provider, MD   simvastatin (ZOCOR) 40 MG tablet Take 40 mg by mouth nightly.     Historical Provider, MD       REVIEW OFSYSTEMS    (2-9 systems for level 4, 10 or more for level 5)      Review of Systems   Constitutional: Negative for diaphoresis and fever. HENT: Negative for congestion. Eyes: Negative for visual disturbance. Respiratory: Negative for shortness of breath. Cardiovascular: Negative for chest pain. Gastrointestinal: Positive for abdominal distention and abdominal pain. Negative for nausea and vomiting. Endocrine: Negative for polyuria. Genitourinary: Negative for dysuria. Musculoskeletal: Negative for back pain. Skin: Negative for wound. Neurological: Positive for speech difficulty. Negative for headaches. Psychiatric/Behavioral: Negative for confusion. PHYSICAL EXAM   (up to 7 for level 4, 8 or more forlevel 5)      ED TRIAGE VITALS BP: 130/70, Temp: 103 °F (39.4 °C), Pulse: 92, Resp: 18, SpO2: (!) 89 %    Vitals:    11/23/21 2024 11/23/21 2038 11/23/21 2305 11/23/21 2306   BP: 130/70  (!) 103/57    Pulse: 92  80    Resp: 18      Temp: 103 °F (39.4 °C)   98.7 °F (37.1 °C)   TempSrc: Oral      SpO2: (!) 89% 95% 93%    Weight: 170 lb (77.1 kg)      Height: 5' 2\" (1.575 m)          Physical Exam  Constitutional:       General: She is not in acute distress. Appearance: She is well-developed. HENT:      Head: Normocephalic and atraumatic. Nose: Nose normal.   Eyes:      Pupils: Pupils are equal, round, and reactive to light. Cardiovascular:      Rate and Rhythm: Normal rate and regular rhythm. Heart sounds: No murmur heard. Pulmonary:      Effort: Pulmonary effort is normal. No respiratory distress. Breath sounds: No stridor. No wheezing. Abdominal:      General: There is distension. Palpations: Abdomen is soft. Tenderness: There is abdominal tenderness in the right lower quadrant, suprapubic area and left lower quadrant. Musculoskeletal:         General: No tenderness. Normal range of motion. Cervical back: Normal range of motion and neck supple.    Skin:     General: Skin is warm and dry.      Capillary Refill: Capillary refill takes less than 2 seconds. Findings: No erythema or rash. Neurological:      Mental Status: She is alert and oriented to person, place, and time. Sensory: No sensory deficit. Deep Tendon Reflexes: Reflexes normal.      Comments: Speech difficulty intermittent aphasia   Psychiatric:         Behavior: Behavior normal.         DIFFERENTIAL  DIAGNOSIS     PLAN (LABS / IMAGING / EKG):  Orders Placed This Encounter   Procedures    Culture, Urine    Culture, Blood 1    Culture, Blood 1    SARS-CoV-2 NAAT (Rapid)    CT Head WO Contrast    CTA HEAD NECK W CONTRAST    CT ABDOMEN PELVIS W IV CONTRAST Additional Contrast? None    XR CHEST PORTABLE    STROKE PANEL    Urinalysis, reflex to microscopic    Lactate, Sepsis    ELECTROLYTES PLUS    Hemoglobin and hematocrit, blood    CALCIUM, IONIC (POC)    Troponin    Microscopic Urinalysis    Diet NPO    Inpatient consult to Stroke Team    Inpatient consult to General Surgery    Inpatient consult to Hospitalist    Venous Blood Gas, POC    Creatinine W/GFR Point of Care    POCT urea (BUN)    Lactic Acid, POC    POCT Glucose    EKG 12 Lead       MEDICATIONS ORDERED:  Orders Placed This Encounter   Medications    acetaminophen (TYLENOL) tablet 1,000 mg    iopamidol (ISOVUE-370) 76 % injection 150 mL    0.9 % sodium chloride bolus    meropenem (MERREM) 1,000 mg in sodium chloride 0.9 % 100 mL IVPB (mini-bag)     Order Specific Question:   Antimicrobial Indications     Answer: Other     Order Specific Question:   Other Abx Indication     Answer:    Suspected Sepsis of Abdominal Origin    morphine injection 4 mg       DDX:     Stroke, Intra-abdominal pathology, UTI, bacteremia, small bowel obstruction    Initial MDM/Plan: 68 y.o. female who presents with abdominal pain, speech abnormality    Stroke alert per attending, last known well 2 hours before presentation however patient has chronic speech mmol/L    Chloride 98 98 - 107 mmol/L    CO2 20 20 - 31 mmol/L    Anion Gap 14 9 - 17 mmol/L    GFR Non-African American 45 (L) >60 mL/min    GFR  54 (L) >60 mL/min    GFR Comment          GFR Staging NOT REPORTED     WBC 8.9 3.5 - 11.3 k/uL    RBC 4.82 3.95 - 5.11 m/uL    Hemoglobin 13.4 11.9 - 15.1 g/dL    Hematocrit 42.2 36.3 - 47.1 %    MCV 87.6 82.6 - 102.9 fL    MCH 27.8 25.2 - 33.5 pg    MCHC 31.8 28.4 - 34.8 g/dL    RDW 16.4 (H) 11.8 - 14.4 %    Platelets 864 343 - 257 k/uL    MPV 11.2 8.1 - 13.5 fL    NRBC Automated 0.0 0.0 per 100 WBC    Total CK 38 26 - 192 U/L    CK-MB 1.4 <5.4 ng/mL    % CKMB 3.7 (H) 0.0 - 3.0 %    CKMB Interpretation NORMAL ISOENZYME PATTERN     Differential Type NOT REPORTED     Seg Neutrophils 82 (H) 36 - 65 %    Lymphocytes 8 (L) 24 - 43 %    Monocytes 9 3 - 12 %    Eosinophils % 0 (L) 1 - 4 %    Basophils 0 0 - 2 %    Immature Granulocytes 1 (H) 0 %    Segs Absolute 7.24 1.50 - 8.10 k/uL    Absolute Lymph # 0.72 (L) 1.10 - 3.70 k/uL    Absolute Mono # 0.80 0.10 - 1.20 k/uL    Absolute Eos # <0.03 0.00 - 0.44 k/uL    Basophils Absolute <0.03 0.00 - 0.20 k/uL    Absolute Immature Granulocyte 0.06 0.00 - 0.30 k/uL    WBC Morphology NOT REPORTED     RBC Morphology ANISOCYTOSIS PRESENT     Platelet Estimate NOT REPORTED     Myoglobin 52 25 - 58 ng/mL    Protime 11.0 9.1 - 12.3 sec    INR 1.0     PTT 27.2 20.5 - 30.5 sec    Troponin, High Sensitivity 29 (H) 0 - 14 ng/L    Troponin T NOT REPORTED <0.03 ng/mL    Troponin Interp NOT REPORTED    Urinalysis, reflex to microscopic   Result Value Ref Range    Color, UA Yellow Yellow    Turbidity UA Clear Clear    Glucose, Ur NEGATIVE NEGATIVE    Bilirubin Urine NEGATIVE NEGATIVE    Ketones, Urine NEGATIVE NEGATIVE    Specific Gravity, UA 1.053 (H) 1.005 - 1.030    Urine Hgb TRACE (A) NEGATIVE    pH, UA 7.5 5.0 - 8.0    Protein, UA NEGATIVE NEGATIVE    Urobilinogen, Urine Normal Normal    Nitrite, Urine NEGATIVE NEGATIVE Leukocyte Esterase, Urine NEGATIVE NEGATIVE    Urinalysis Comments NOT REPORTED    Lactate, Sepsis   Result Value Ref Range    Lactic Acid, Sepsis NOT REPORTED 0.5 - 1.9 mmol/L    Lactic Acid, Sepsis, Whole Blood 2.3 (H) 0.5 - 1.9 mmol/L   ELECTROLYTES PLUS   Result Value Ref Range    POC Sodium 135 (L) 138 - 146 mmol/L    POC Potassium 4.4 3.5 - 4.5 mmol/L    POC Chloride 101 98 - 107 mmol/L    POC TCO2 26 22 - 30 mmol/L    Anion Gap 9 7 - 16 mmol/L   Hemoglobin and hematocrit, blood   Result Value Ref Range    POC Hemoglobin 13.8 12.0 - 16.0 g/dL    POC Hematocrit 41 36 - 46 %   CALCIUM, IONIC (POC)   Result Value Ref Range    POC Ionized Calcium 1.18 1.15 - 1.33 mmol/L   Microscopic Urinalysis   Result Value Ref Range    -          WBC, UA None 0 - 5 /HPF    RBC, UA 2 TO 5 0 - 4 /HPF    Casts UA NOT REPORTED 0 - 8 /LPF    Crystals, UA NOT REPORTED None /HPF    Epithelial Cells UA None 0 - 5 /HPF    Renal Epithelial, UA NOT REPORTED 0 /HPF    Bacteria, UA NOT REPORTED None    Mucus, UA NOT REPORTED None    Trichomonas, UA NOT REPORTED None    Amorphous, UA NOT REPORTED None    Other Observations UA NOT REPORTED NOT REQ.     Yeast, UA NOT REPORTED None   Venous Blood Gas, POC   Result Value Ref Range    pH, Nimesh 7.428 7.320 - 7.430    pCO2, Nimesh 38.6 (L) 41.0 - 51.0 mm Hg    pO2, Nimesh 28.0 (L) 30.0 - 50.0 mm Hg    HCO3, Venous 25.5 22.0 - 29.0 mmol/L    Total CO2, Venous NOT REPORTED 23.0 - 30.0 mmol/L    Negative Base Excess, Nimesh NOT REPORTED 0.0 - 2.0    Positive Base Excess, Nimesh 1 0.0 - 3.0    O2 Sat, Nimesh 55 (L) 60.0 - 85.0 %    O2 Device/Flow/% NOT REPORTED     Jeremi Test NOT REPORTED     Sample Site NOT REPORTED     Mode NOT REPORTED     FIO2 NOT REPORTED     Pt Temp NOT REPORTED     POC pH Temp NOT REPORTED     POC pCO2 Temp NOT REPORTED mm Hg    POC pO2 Temp NOT REPORTED mm Hg   Creatinine W/GFR Point of Care   Result Value Ref Range    POC Creatinine 1.44 (H) 0.51 - 1.19 mg/dL    GFR Comment 43 (L) >60 mL/min GFR Non-African American 35 (L) >60 mL/min    GFR Comment         POCT urea (BUN)   Result Value Ref Range    POC BUN 18 8 - 26 mg/dL   Lactic Acid, POC   Result Value Ref Range    POC Lactic Acid 1.51 (H) 0.56 - 1.39 mmol/L   POCT Glucose   Result Value Ref Range    POC Glucose 126 (H) 74 - 100 mg/dL       RADIOLOGY:  CT ABDOMEN PELVIS W IV CONTRAST Additional Contrast? None   Final Result   Moderate hiatal hernia with a fluid-filled patulous distal esophagus which is   similar to prior comparison exams. Distended stomach with a normal caliber duodenum. Query possible   gastroparesis or functional outlet obstruction. Possible findings of proctitis. Correlate clinically. Hepatomegaly with steatosis, unchanged. Findings in the dependent lung bases which are suspicious for sequelae of   aspiration pneumonia in the appropriate clinical setting. CTA HEAD NECK W CONTRAST   Final Result   No large vessel occlusion visualized within the head or neck. Incidentally noted pulmonary emphysema and dilated, air and fluid-filled   esophagus. CT Head WO Contrast   Final Result   No CT evidence of an acute infarct. The findings were sent to the Radiology Results [address] at 8:54   pm on 11/23/2021to be communicated to a licensed caregiver. XR CHEST PORTABLE    (Results Pending)       EKG  No ST segment elevations or depression seen on EKG    All EKG's are interpreted by the Emergency Department Physicianwho either signs or Co-signs this chart in the absence of a cardiologist.    EMERGENCY DEPARTMENT COURSE:  ED Course as of 11/23/21 2308 Tue Nov 23, 2021 2022 Patient seen and assessed in emergency department no acute respiratory or cardiovascular distress. Patient here with intermittent aphasia, NIH of 0 on presentation, no chest pain or shortness of breath, moving all extremities, speaking baseline at this time.   Denies chest pain or shortness of breath but continues to say she has lower abdominal pain. States the pain is 5 out of 10 in severity worse when you push on it, denies any urinary symptoms, vaginal symptoms back pain. States no headache or vision changes. States that she feels weak all over. [PS]   2037 Temp: 103 °F (39.4 °C) [PS]   2040 SpO2: 95 % [PS]   2126 Lactic Acid, Sepsis, Whole Blood(!): 2.3 [PS]   2127 Troponin, High Sensitivity(!): 29 [PS]   3405 SARS-CoV-2, Rapid: Not Detected [PS]   3596 Temp: 98.7 °F (37.1 °C) [PS]      ED Course User Index  [PS] Rubens Phillips MD          PROCEDURES:  None    CONSULTS:  IP CONSULT TO STROKE TEAM  IP CONSULT TO GENERAL SURGERY  IP CONSULT TO HOSPITALIST    CRITICAL CARE:  Please see attending note    FINAL IMPRESSION      1. Lower abdominal pain    2. Altered mental status, unspecified altered mental status type          DISPOSITION / PLAN     DISPOSITION Decision To Admit 11/23/2021 08:25:15 PM       PATIENT REFERRED TO:  No follow-up provider specified.     DISCHARGE MEDICATIONS:  New Prescriptions    No medications on file       Rubens Phillips MD  Emergency Medicine Resident    (Please note that portions of this note were completed with a voice recognition program.Efforts were made to edit the dictations but occasionally words are mis-transcribed.)        Rubens Phillips MD  Resident  11/23/21 0808

## 2021-11-24 NOTE — CONSULTS
[facility] Neurology   IN-PATIENT SERVICE    NEUROLOGY CONSULT  NOTE            Date:   11/24/2021  Patient name:  Sandra Smith  Date of admission:  11/23/2021  YOB: 1945      Chief Complaint:     Chief Complaint   Patient presents with    Abdominal Pain    Altered Mental Status       Reason for Consult:      Aphasia, AMS    History of Present Illness: The patient is a 68 y.o. female who presents with Abdominal Pain and Altered Mental Status   and she is admitted to the hospital on 11/23/2021 for the management of bowel obstruction. Patient states she had been having speech difficulty since yesterday, unsure when the symptoms started. Patient describes it as being able to think of words but having difficulty seeing them out. She stated that it felt like she could not get them out right. States her symptoms continue to get worse so the nursing facility she states that called EMS. Currently patient symptoms are improving. NIH 0 at presentation. CT head and CTA head and neck unremarkable for any acute abnormality. CT abdomen pelvis ordered by ED team showed possible bowel obstruction. Patient states she has had similar symptoms in the past when she has had a UTI. Denies taking any blood thinners, history of strokes, seizures or CAD.       Prior to arrival patient was on  Antiplatelets/anticoagulants: None  Statins: Zocor 40 mg Nightly      Past Medical History:     Past Medical History:   Diagnosis Date    Anxiety     Arthritis     Back pain     Bronchitis     Caffeine use     8 coffee / day    Carpal tunnel syndrome     Cataracts, bilateral     Constipation     Depression     Diarrhea     GERD (gastroesophageal reflux disease)     H/O gastric ulcer     Hyperlipidemia     Hypertension     Mumps     MVP (mitral valve prolapse)     Dr. Estuardo Monroy in May 2019    Recurrent UTI     Dr. Norwood González    Sinusitis     Trache[facility] in place [facility])     Ulcerative esophagitis     Wellness examination     Dr. Robinson Larson seen in Jan 2020        Past Surgical History:     Past Surgical History:   Procedure Laterality Date    APPENDECTOMY      CARPAL TUNNEL RELEASE      CHOLECYSTECTOMY, LAPAROSCOPIC N/A 05/22/2020    XI LAPAROSCOPIC ROBOTIC CHOLECYSTECTOMY, PYLOROPLASTY performed by Titi Wheat MD at [facility]  05/21/2020    with stent insertion, balloon dilation, sphinctereotomy    ERCP  05/21/2020    ** CASE IN OR WITY GI STAFF** ERCP STENT INSERTION performed by Sam Barragan MD at [facility]    ERCP  05/21/2020    ** CASE IN OR WITH GI STAFF**ERCP SPHINCTER/PAPILLOTOMY performed by [facility] MD at [facility]    ERCP  05/21/2020    ** CASE IN OR WITH GI STAFF**ERCP DILATION BALLOON performed by Sam Barragan MD at [facility]    ERCP N/A 2/8/2021    ERCP STENT REMOVAL performed by Eder Oshea MD at [facility]    ERCP  2/8/2021    ERCP STONE REMOVAL performed by Eder Oshea MD at [address]    GASTRIC FUNDOPLICATION N/A 94/87/8864    XI LAPAROSCOPIC ROBOTIC HIATAL HERNIA REPAIR, CHERYL FUNDOPLICATION performed by Titi Wheat MD at [facility] N/A 03/27/2020    EGD PEG TUBE PLACEMENT performed by Titi Wheat MD at [facility] N/A 2/8/2021    **CASE IN O.R. W/ GI STAFF - EGD WITH REMOVAL PEG TUBE performed by Eder Oshea MD at [facility]    [address] CATH POWER PICC TRIPLE  03/04/2020         HYSTERECTOMY      Abdominal    JOINT REPLACEMENT Right     right hip    OVARY REMOVAL      RHINOPLASTY      TONSILLECTOMY      TOTAL HIP ARTHROPLASTY      TOTAL NEPHRECTOMY      atrophic from infections, age 13   Robertha Rumps TRACHEOSTOMY N/A 03/27/2020    **ADD ON **WANTS 10:00AM **TRACHEOTOMY performed by Titi Wheat MD at [address] 04/02/2020    TRACHEOTOMY EXCHANGE performed by Jude Coles Ad Duke MD at [facility] ENDOSCOPY N/A 03/17/2020    BEDSIDE EGD ESOPHAGOGASTRODUODENOSCOPY (ICU) performed by Kitty Dolan MD at [address] [place] [address] N/A 05/18/2020    EGD BIOPSY performed by Jeanetta Pallas, MD at [address] [place] [address]  05/18/2020    EGD DILATION BALLOON performed by Jeanetta Pallas, MD at [address] [place] [address] N/A 07/06/2020    ** CASE IN O.R. WITH GI STAFF** EGD ESOPHAGOGASTRODUODENOSCOPY performed by Chaparrita Nascimento MD at [address] [place] [address] N/A 11/09/2020    EGD DIAGNOSTIC ONLY performed by Loida Argueta MD at [address] [place] [address]  02/08/2021    - EGD WITH REMOVAL PEG TUBE,ERCP STENT REMOVAL,ERCP STONE REMOVAL        Medications Prior to Admission:     Prior to Admission medications    Medication Sig Start Date End Date Taking? Authorizing Provider   bisacodyl (DULCOLAX) 10 MG suppository Place 10 mg rectally daily as needed for Constipation    Historical Provider, MD   Sodium Phosphates (FLEET) 7-19 GM/118ML Place 1 enema rectally As needed after no BM for 5 days    Historical Provider, MD   clotrimazole-betamethasone (LOTRISONE) 1-0.05 % cream Apply topically 2 times daily Apply topically 2 times daily.     Historical Provider, MD   magnesium hydroxide (MILK OF MAGNESIA) 400 MG/5ML suspension Take by mouth daily as needed for Constipation    Historical Provider, MD   polyethylene glycol (GLYCOLAX) 17 g packet Take 17 g by mouth every other day    Historical Provider, MD   metoclopramide (REGLAN) 5 MG tablet Take 5 mg by mouth 3 times daily (before meals) For nausea    Historical Provider, MD   budesonide-formoterol (SYMBICORT) 160-4.5 MCG/ACT AERO Inhale 2 puffs into the lungs 2 times daily    Historical Provider, MD   gabapentin (NEURONTIN) 300 MG capsule Take 1 capsule by mouth 3 times daily for 30 days. 2/10/21 3/12/21  Howard Gil MD   QUEtiapine (SEROQUEL) 25 MG tablet Take 1 tablet by mouth 2 times daily 12/16/20   MARGOTH Desai - NP   ferrous sulfate (FE TABS 325) 325 (65 Fe) MG EC tablet Take 1 tablet by mouth daily (with breakfast) 12/16/20   MARGOTH Desai - NP   furosemide (LASIX) 20 MG tablet Take 1 tablet by mouth daily 12/16/20   MARGOTH Desai - NP   gabapentin (NEURONTIN) 300 MG capsule Take 1 capsule by mouth 3 times daily for 30 days. 12/16/20 9/10/21  MARGOTH Desai NP   clonazePAM (KLONOPIN) 0.5 MG tablet Take 0.5 tablets by mouth 2 times daily for 60 days. Taking 1/2 tablet BID 7/7/20 9/10/21  Aaron Brand MD   pantoprazole (PROTONIX) 40 MG tablet Take 1 tablet by mouth 2 times daily 7/7/20   Aaron Brand MD   amitriptyline (ELAVIL) 25 MG tablet Take 1 tablet by mouth nightly 7/14/20   Aaron Brand MD   busPIRone (BUSPAR) 10 MG tablet Take 2 tablets by mouth 3 times daily 7/14/20   Aaron Brand MD   metoprolol succinate (TOPROL XL) 25 MG extended release tablet Take 1 tablet by mouth daily 5/30/20   Vinayak Goodson MD   docusate (COLACE) 50 MG/5ML liquid 10 mLs by Per G Tube route 2 times daily 4/6/20   Myles Metz MD   sucralfate (CARAFATE) 1 GM tablet Take 1 tablet by mouth 4 times daily 4/6/20   Myles Metz MD   traZODone (DESYREL) 100 MG tablet Take 1 tablet by mouth nightly 4/6/20   Myles Metz MD RA VITAMIN D-3 50 MCG (2000 UT) CAPS take 1 capsule by mouth once daily 2/14/20   Historical Provider, MD   Oyster Shell 500 MG TABS Take 500 mg by mouth 2 times daily     Historical Provider, MD   calcium carbonate (TUMS) 500 MG chewable tablet Take 1 tablet by mouth 3 times daily as needed for Heartburn    Historical Provider, MD   simvastatin (ZOCOR) 40 MG tablet Take 40 mg by mouth nightly.     Historical Provider, MD        Allergies:     Prednisone, Compazine [prochlorperazine maleate], Penicillin v potassium, and Penicillins    Social History:     Tobacco:    reports that she has quit smoking. Her smoking use included cigarettes. She has a 50.00 pack-year smoking history. She has never used smokeless tobacco.  Alcohol:      reports no history of alcohol use. Drug Use:  reports no history of drug use.     Family History:     Family History   Problem Relation Age of Onset    Cancer Mother         uterine    Heart Attack Mother     Heart Attack Father     Other Maternal Grandfather         foot gangrene    Other Paternal Grandmother         bleeding ulcers    Other Paternal Grandfather         lung cancer       Review of Systems:       Constitutional Negative for fever and chills   HEENT Negative for ear discharge, ear pain, nosebleed   Eyes Negative for photophobia, pain and discharge   Respiratory Negative for hemoptysis and sputum   Cardiovascular Negative for orthopnea, claudication and PND   Gastrointestinal Negative for abdominal pain, diarrhea, blood in stool   Musculoskeletal Negative for joint pain, negative for myalgia   Skin Negative for rash or itching   hematology Negative for ecchymosis, anemia   Psychiatric Negative for suicidal ideation, anxiety, depression, hallucinations       Physical Exam:   BP (!) 91/56   Pulse 69   Temp 98.7 °F (37.1 °C)   Resp 18   Ht 5' 2\" (1.575 m)   Wt 170 lb (77.1 kg)   SpO2 92%   BMI 31.09 kg/m²   Temp (24hrs), Av.9 °F (38.3 °C), Min:98.7 °F (37.1 °C), Max:103 °F (39.4 °C)        General examination:      General Appearance:  alert, well appearing, and in no acute distress  HEENT: Normocephalic, atraumatic, moist mucus membranes  Neck: supple, no carotid bruits, (-) nuchal rigidity  Lungs:  Respirations unlabored, chest wall no deformity, BS normal  Cardiovascular: normal rate, regular rhythm  Abdomen: Soft, nontender, nondistended, normal bowel sounds  Skin: No gross lesions, rashes, bruising or bleeding on exposed skin area  Extremities:  peripheral pulses palpable, clubbing or edema  Psych: normal affect    NEUROLOGIC EXAMINATION    Mental status   Alert and oriented x 3; following all commands;   speech is fluent, no dysarthria, aphasia. Cranial nerves   II - visual fields intact to confrontation; pupils reactive  III, IV, VI - extraocular muscles intact; no VERONICA; no nystagmus; no ptosis   V - normal facial sensation                                                               VII - normal facial symmetry                                                             VIII - intact hearing                                                                             IX, X - symmetrical palate elevation                                               XI - symmetrical shoulder shrug                                                       XII - midline tongue without atrophy or fasciculation     Motor function  Strength:   5/5 RUE, 5/5 RLE  5/5 LUE, 5/5  LLE  Normal bulk and tone. Sensory function Intact to touch, pin, vibration, proprioception throughout     Cerebellar Intact finger-nose-finger testing. Intact heel-shin testing. No dysdiadochokinesia present. No tremors                        Reflex function 2/4 symmetric throughout . Downgoing plantar response bilaterally. (-)Cabrera's sign bilaterally      Gait                  Normal station and gait. Normal Tandem, tip toes and heel walking         NIH STROKE SCALE:    Time Performed:  8:25 PM     1a. Level of consciousness:  0 - alert; keenly responsive  1b. Level of consciousness questions:  0 - answers both questions correctly  1c. Level of consciousness questions:  0 - performs both tasks correctly  2. Best Gaze:  0 - normal  3. Visual:  0 - no visual loss  4. Facial Palsy:  0 - normal symmetric movement  5a. Motor left arm:  0 - no drift, limb holds 90 (or 45) degrees for full 10 seconds  5b. Motor right arm:  0 - no drift, limb holds 90 (or 45) degrees for full 10 seconds  6a.   Motor left le - no drift; leg holds 30 degree position for full 5 seconds  6b. Motor right le - no drift; leg holds 30 degree position for full 5 seconds  7. Limb Ataxia:  0 - absent  8. Sensory:  0 - normal; no sensory loss  9. Best Language:  0 - no aphasia, normal  10. Dysarthria:  0 - normal  11.   Extinction and Inattention:  0 - no abnormality    TOTAL:  0    Diagnostics:      Laboratory Testing:    CBC:   Recent Labs     21   WBC 8.9   HGB 13.4          BMP:    Recent Labs     21   *  --    K 4.2  --    CL 98  --    CO2 20  --    BUN 17  --    CREATININE 1.18* 1.44*   GLUCOSE 132*  --          Lab Results   Component Value Date    CHOL 203 (H) 2020    LDLCHOLESTEROL 106 2020    HDL 43 2020    TRIG 268 (H) 2020    ALT 21 09/10/2021    AST 17 09/10/2021    INR 1.0 2021    LABA1C 5.0 2020           Imaging/Diagnostics:      CT head     CTA head and neck       Assessment and plan:       Patient Active Problem List   Diagnosis    Frequency of urination    Back pain    GERD (gastroesophageal reflux disease)    MVP (mitral valve prolapse)    Depression    Anxiety    BENEDICT [facility]    Dysphagia    Hiatal hernia    History of repair of hiatal hernia    Centrilobular emphysema ([facility])    Esophageal dilatation    Shock ([address]    Fever    Critical illness polyneuropathy ([address]    Gastric outlet obstruction    Recurrent UTI    Tracheostomy in place ([address]    H/O gastric ulcer    Hyperlipidemia    Hypertension    Tobacco abuse    Chronic respiratory failure with hypoxia (HCC)    S/P percutaneous endoscopic gastrostomy (PEG) tube placement ([facility])    S/P Nissen fundoplication (without gastrostomy tube) procedure    Acute blood loss anemia    Phlebitis after infusion    Esophagitis determined by endoscopy    Expressive aphasia    Transient speech disturbance    Speech disturbance   

## 2021-11-24 NOTE — ED NOTES
Rec'd report from [place]. Emilia Bloodgood Pt remains in endo.       Brock Paulino RN  11/24/21 2190

## 2021-11-24 NOTE — ED PROVIDER NOTES
[facility]  ED  eMERGENCY dEPARTMENT eNCOUnter   Attending Attestation     Pt Name: Myles Kumari  MRN: [id number]  Jordigfmaria luisa 1945  Date of evaluation: 11/23/21       Myles Kumari is a 68 y.o. female who presents with Abdominal Pain and Altered Mental Status      History: Patient presents with intermittent expressive aphasia and facial droop this started around 1745. Patient is improved at this time but has been having intermittent symptoms since EMS picked him up. Patient also has some abdominal pain and chest pain. Exam: Heart rate and rhythm are regular. Lungs are clear to station bilaterally. Abdomen is soft, nontender. Patient is awake, alert, acting appropriately. No focal deficits at this time. Plan for stroke alert, urinalysis, blood work, likely admission. EKG shows normal sinus rhythm with normal axis. Rate is 92 beats minute. No ST elevations or depressions. T waves are flattened but no inversion. Nonspecific EKG no blocks arrhythmias. I performed a history and physical examination of the patient and discussed management with the resident. I reviewed the residents note and agree with the documented findings and plan of care. Any areas of disagreement are noted on the chart. I was personally present for the key portions of any procedures. I have documented in the chart those procedures where I was not present during the key portions. I have personally reviewed all images and agree with the resident's interpretation. I have reviewed the emergency nurses triage note. I agree with the chief complaint, past medical history, past surgical history, allergies, medications, social and family history as documented unless otherwise noted below. Documentation of the HPI, Physical Exam and Medical Decision Making performed by medical students or scribes is based on my personal performance of the HPI, PE and MDM.  For Phys Assistant/ Nurse Practitioner cases/documentation I have had a face to face evaluation of this patient and have completed at least one if not all key elements of the E/M (history, physical exam, and MDM). Additional findings are as noted. For APC cases I have personally evaluated and examined the patient in conjunction with the APC and agree with the treatment plan and disposition of the patient as recorded by the APC.     Regina Veras MD  Attending Emergency  Physician       Caroline Sanchez MD  11/23/21 2036

## 2021-11-24 NOTE — CONSULTS
Trauma, Emergency and Critical Surgical Services              History and Physical    PATIENT NAME: Natalia Boyer  AGE: 68 y.o. MEDICAL RECORD NO. [id number]  DATE: 11/23/2021  SURGEON: Valorie Ma  PRIMARY CARE PHYSICIAN: Javed Ryan MD    Patient evaluated at the request of  Dr. Betsey Bowden  Reason for evaluation: gastroparesis, functional outlet obstruction      IMPRESSION:     Patient Active Problem List   Diagnosis    Frequency of urination    Back pain    GERD (gastroesophageal reflux disease)    MVP (mitral valve prolapse)    Depression    Anxiety    BENEDICT (acute kidney injury) ([facility] [address]    Dysphagia    Hiatal hernia    History of repair of hiatal hernia    Centrilobular emphysema (HCC)    Esophageal dilatation    Shock ([facility] [address]    Fever    Critical illness polyneuropathy ([facility] [address]    Gastric outlet obstruction    Recurrent UTI    Tracheostomy in place [facility])    H/O gastric ulcer    Hyperlipidemia    Hypertension    Tobacco abuse    Chronic respiratory failure with hypoxia (HCC)    S/P percutaneous endoscopic gastrostomy (PEG) tube placement (HCC)    S/P Nissen fundoplication (without gastrostomy tube) procedure    Acute blood loss anemia    Phlebitis after infusion    Esophagitis determined by endoscopy    Expressive aphasia    Transient speech disturbance    Speech disturbance    Coffee ground emesis    Acute cystitis without hematuria    Ulcerative esophagitis   75yo with gastric distension, and AMS      MEDICAL DECISION MAKING AND PLAN:   Medical management per primary team  Recommend GI consult for possible EGD  Ok for liquid diet  CT scan reviewed and compared to study done in Sept. Patient has some distension of stomach. Bowels normal caliber, without signs of obstruction. No acute surgical intervention. HISTORY:   History of Chief Complaint:    Natalia Boyer is a 68 y.o. female who presents with AMS and abdominal distension and suprapubic abdominal pain. Patient is poor historian.  Admits to abdominal discomfort for a month now but then claims it started a few days ago. Patient admits to some bloating and nausea. No emesis. She has not had a BM in 3 days, she normally has one daily. She denies noticing any blood in her stool. Patient has urinary retention as of today. Denies pain with urination near suprapubic area. No burning/stinging. Patient claims she has not been eating for about 3 weeks now. She lives in [facility]. Patient admit to weight loss of 6lbs in 3 weeks due to low appetite. Denies alcohol use. Previous smoker. Surgical history significant for robotic hiatal hernia repair and syeda gastric fundoplication with Dr. Tamy Shankar in 2/24/2020. She underwent EGD with Dr. Masoud Mari on 11/9/2020 where erosive esophagitis was found with large hiatal hernia and patulous EG junction. patient takes protonix and admits to worsening reflux recently. Patient also admits to blurry vision and difficulty with speech since yesterday. Past Medical History   has a past medical history of Anxiety, Arthritis, Back pain, Bronchitis, Caffeine use, Carpal tunnel syndrome, Cataracts, bilateral, Constipation, Depression, Diarrhea, GERD (gastroesophageal reflux disease), H/O gastric ulcer, Hyperlipidemia, Hypertension, Mumps, MVP (mitral valve prolapse), Recurrent UTI, Sinusitis, Tracheostomy in place [facility]), Ulcerative esophagitis, and Wellness examination. Past Surgical History   has a past surgical history that includes Hysterectomy; total nephrectomy; Tonsillectomy; Appendectomy; Cystoscopy; Ovary removal; Tubal ligation; rhinoplasty; Carpal tunnel release; Hand surgery; Total hip arthroplasty; eye surgery; Colonoscopy; joint replacement (Right); Gastric fundoplication (N/A, 13/80/5575);  cath power picc triple (03/04/2020); Upper gastrointestinal endoscopy (N/A, 03/17/2020); tracheostomy (N/A, 03/27/2020); Gastrostomy tube placement (N/A, 03/27/2020); tracheostomy (N/A, 04/02/2020);  Upper 300 MG capsule Take 1 capsule by mouth 3 times daily for 30 days. 12/16/20 9/10/21  MARGOTH Zamora NP   clonazePAM (KLONOPIN) 0.5 MG tablet Take 0.5 tablets by mouth 2 times daily for 60 days. Taking 1/2 tablet BID 7/7/20 9/10/21  Liset Molina MD   pantoprazole (PROTONIX) 40 MG tablet Take 1 tablet by mouth 2 times daily 7/7/20   Liset Molina MD   amitriptyline (ELAVIL) 25 MG tablet Take 1 tablet by mouth nightly 7/14/20   Liset Molina MD   busPIRone (BUSPAR) 10 MG tablet Take 2 tablets by mouth 3 times daily 7/14/20   Liset Molina MD   metoprolol succinate (TOPROL XL) 25 MG extended release tablet Take 1 tablet by mouth daily 5/30/20   Elías Cardozo MD   docusate (COLACE) 50 MG/5ML liquid 10 mLs by Per G Tube route 2 times daily 4/6/20   Curly Marc MD   sucralfate (CARAFATE) 1 GM tablet Take 1 tablet by mouth 4 times daily 4/6/20   Curly Marc MD   traZODone (DESYREL) 100 MG tablet Take 1 tablet by mouth nightly 4/6/20   Curly Marc MD RA VITAMIN D-3 50 MCG (2000 UT) CAPS take 1 capsule by mouth once daily 2/14/20   Historical Provider, MD   Oyster Shell 500 MG TABS Take 500 mg by mouth 2 times daily     Historical Provider, MD   calcium carbonate (TUMS) 500 MG chewable tablet Take 1 tablet by mouth 3 times daily as needed for Heartburn    Historical Provider, MD   simvastatin (ZOCOR) 40 MG tablet Take 40 mg by mouth nightly. Historical Provider, MD    Scheduled Meds:   meropenem  1,000 mg IntraVENous Once     Continuous Infusions:   PRN Meds:. Allergies  is allergic to prednisone, compazine [prochlorperazine maleate], penicillin v potassium, and penicillins. Family History  family history includes Cancer in her mother; Heart Attack in her father and mother; Other in her maternal grandfather, paternal grandfather, and paternal grandmother. Social History   reports that she has quit smoking. Her smoking use included cigarettes. She has a 50.00 pack-year smoking history. She has never used smokeless tobacco.   reports no history of alcohol use. reports no history of drug use. Review of Systems  Review of Systems - General ROS: negative for - chills, fever or night sweats  Psychological ROS: negative for - anxiety, behavioral disorder or depression  Ophthalmic ROS: +blurry vision  ENT ROS: negative for - epistaxis, headaches or nasal congestion  Hematological and Lymphatic ROS: negative for - bleeding problems, blood clots or bruising  Endocrine ROS: negative for - malaise/lethargy, mood swings or palpitations  Respiratory ROS: negative for - cough, hemoptysis or shortness of breath  Cardiovascular ROS: no chest pain or dyspnea on exertion  Gastrointestinal ROS: See HPI  Genito-Urinary ROS: + trouble voiding  Musculoskeletal ROS: negative for - joint pain, muscle pain  Neurological ROS: no TIA or stroke symptoms  Dermatological ROS: negative for dry skin and eczema      Review of systems negative unless above. PHYSICAL:   VITALS:  height is 5' 2\" (1.575 m) and weight is 170 lb (77.1 kg). Her temperature is 98.7 °F (37.1 °C). Her blood pressure is 103/57 (abnormal) and her pulse is 80. Her respiration is 18 and oxygen saturation is 93%. CONSTITUTIONAL: Alert and oriented  HEENT: Head is normocephalic, atraumatic. NECK: Soft, trachea midline and straight  LUNGS: no inc WOB  CARDIOVASCULAR: Normal S1, S2  ABDOMEN: soft, minimally tender in suprapubic abdomen, mildly distended, no masses, non peritoneal, no guarding  NEUROLOGIC: CN II-XII are grossly intact.    EXTREMITIES: no cyanosis, +edema bilaterally    LABS:     Recent Labs     11/23/21 2043 11/23/21  2101 11/23/21  2212   WBC 8.9  --   --    HGB 13.4  --   --    HCT 42.2  --   --      --   --    *  --   --    K 4.2  --   --    CL 98  --   --    CO2 20  --   --    BUN 17  --   --    CREATININE 1.18* 1.44*  --    CALCIUM 9.0  --   --    INR 1.0  --   --    NITRU  --   --  NEGATIVE   COLORU  --   --  Yellow BACTERIA  --   --  NOT REPORTED     No results for input(s): ALKPHOS, ALT, AST, BILITOT, BILIDIR, LABALBU, AMYLASE, LIPASE in the last 72 hours. RADIOLOGY:   CT Head WO Contrast    Result Date: 11/23/2021  EXAMINATION: CT OF THE HEAD WITHOUT CONTRAST  11/23/2021 8:24 pm TECHNIQUE: CT of the head was performed without the administration of intravenous contrast. Dose modulation, iterative reconstruction, and/or weight based adjustment of the mA/kV was utilized to reduce the radiation dose to as low as reasonably achievable. COMPARISON: None. HISTORY: ORDERING SYSTEM PROVIDED HISTORY: stroke TECHNOLOGIST PROVIDED HISTORY: stroke Reason for Exam: STROKE ALERT - Aphasia Acuity: Unknown Type of Exam: Unknown FINDINGS: BRAIN/VENTRICLES: There is no acute intracranial hemorrhage, mass effect or midline shift. No abnormal extra-axial fluid collection. The gray-white differentiation is maintained without evidence of an acute infarct. There is no evidence of hydrocephalus. ORBITS: The visualized portion of the orbits demonstrate no acute abnormality. SINUSES: The visualized paranasal sinuses and mastoid air cells demonstrate no acute abnormality. SOFT TISSUES/SKULL:  No acute abnormality of the visualized skull or soft tissues. No CT evidence of an acute infarct. The findings were sent to the Radiology Results [address] at 8:54 pm on 11/23/2021to be communicated to a licensed caregiver. CT ABDOMEN PELVIS W IV CONTRAST Additional Contrast? None    Result Date: 11/23/2021  EXAMINATION: CT OF THE ABDOMEN AND PELVIS WITH CONTRAST 11/23/2021 5:25 pm TECHNIQUE: CT of the abdomen and pelvis was performed with the administration of intravenous contrast. Multiplanar reformatted images are provided for review. Dose modulation, iterative reconstruction, and/or weight based adjustment of the mA/kV was utilized to reduce the radiation dose to as low as reasonably achievable.  COMPARISON: 09/09/2021, 11/07/2020 HISTORY: ORDERING SYSTEM PROVIDED HISTORY: eval for abdominal pathology TECHNOLOGIST PROVIDED HISTORY: eval for abdominal pathology Decision Support Exception - unselect if not a suspected or confirmed emergency medical condition->Emergency Medical Condition (MA) Reason for Exam: Abdomen pain - Evaluate for abdominal pathology FINDINGS: Lower Chest: Consolidative changes in the dependent right lower lobe with reticular opacities and tree-in-bud nodularity in the dependent left lower lobe. Basilar bronchiectasis with small airways occlusion in the dependent right lower lobe. Small right pleural effusion. Moderate hiatal hernia with a patulous fluid-filled distal esophagus. Organs: Hepatomegaly with steatosis which is similar to prior. Spleen is upper limits of normal for size. Cholecystectomy. Atrophic pancreas without surrounding inflammatory change. Right adrenal gland is unremarkable. Unchanged nodularity of the left adrenal gland. Prior right nephrectomy. The left kidney enhances normally with a subcentimeter cyst at the lower pole. Excreting contrast in the collecting system limits assessment for urolithiasis. GI/Bowel: Stomach is mildly distended with gas and fluid. The duodenum is normal caliber. The appendix is not definitively identified. No secondary signs of acute appendicitis. Mild distension of the proximal transverse colon to just under 7 mm with smooth tapering to normal caliber and no definite focal obstruction. Suggestion of mild wall thickening and mucosal hyperenhancement at the rectum. Pelvis: Assessment is degraded by streak artifact from right hip arthroplasty hardware. The urinary bladder contains excreting contrast but is otherwise grossly unremarkable. Hysterectomy. Neither ovary is definitively identified. Peritoneum/Retroperitoneum: No free fluid or free air. Mildly prominent lymph nodes in the maria luisa hepatis are unchanged from prior.   Aortoiliac atherosclerosis and minimal ectasia but no aneurysm. Bones/Soft Tissues: Osseous demineralization and degenerative changes with levoconvex rotatory scoliosis. Partially imaged right total hip arthroplasty. No acute abnormality in the superficial soft tissues. Moderate hiatal hernia with a fluid-filled patulous distal esophagus which is similar to prior comparison exams. Distended stomach with a normal caliber duodenum. Query possible gastroparesis or functional outlet obstruction. Possible findings of proctitis. Correlate clinically. Hepatomegaly with steatosis, unchanged. Findings in the dependent lung bases which are suspicious for sequelae of aspiration pneumonia in the appropriate clinical setting. CTA HEAD NECK W CONTRAST    Result Date: 11/23/2021  EXAMINATION: CTA OF THE HEAD AND NECK WITH CONTRAST 11/23/2021 8:25 pm: TECHNIQUE: CTA of the head and neck was performed with the administration of intravenous contrast. Multiplanar reformatted images are provided for review. MIP images are provided for review. Stenosis of the internal carotid arteries measured using NASCET criteria. Dose modulation, iterative reconstruction, and/or weight based adjustment of the mA/kV was utilized to reduce the radiation dose to as low as reasonably achievable. COMPARISON: None. HISTORY: ORDERING SYSTEM PROVIDED HISTORY: eval for stroke TECHNOLOGIST PROVIDED HISTORY: eval for stroke Decision Support Exception - unselect if not a suspected or confirmed emergency medical condition->Emergency Medical Condition (MA) FINDINGS: CTA NECK: AORTIC ARCH/ARCH VESSELS: No dissection or arterial injury. No significant stenosis of the brachiocephalic or subclavian arteries. CAROTID ARTERIES: Atherosclerotic disease results in 25% stenosis of the proximal right cervical internal carotid artery. VERTEBRAL ARTERIES: No dissection, arterial injury, or significant stenosis. SOFT TISSUES: Emphysematous changes of the lungs are present.   There is a dilated, air and fluid-filled esophagus. BONES: Degenerative changes of the spine are noted. CTA HEAD: ANTERIOR CIRCULATION: No significant stenosis of the intracranial internal carotid, anterior cerebral, or middle cerebral arteries. No aneurysm. POSTERIOR CIRCULATION: No significant stenosis of the vertebral, basilar, or posterior cerebral arteries. No aneurysm. OTHER: No dural venous sinus thrombosis on this non-dedicated study. BRAIN: See separately dictated noncontrast head CT report. No large vessel occlusion visualized within the head or neck. Incidentally noted pulmonary emphysema and dilated, air and fluid-filled esophagus. Thank you for the interesting evaluation. Further recommendations to follow. Jana Townsend, DO  11/23/21, 11:28 PM             Trauma Attending Attestation      I have reviewed the above [facility] note(s) and confirmed the key elements of the medical history and physical exam. I have seen and examined the pt. I have discussed the findings, established the care plan and recommendations with Resident, [facility] RN, bedside nurse.   Will transition care to Gregor Adamson,   11/24/2021  12:08 PM

## 2021-11-24 NOTE — ED NOTES
The following labs obtained, labeled with pt sticker and tubed to lab: by Dara Carrillo RN    [] Blue     [] Luz Marina Izquierdo   [] on ice  [] Green/yellow  [] Green/black [] on ice  [] Yellow  [] Red  [] Pink      [] COVID-19 swab    [] Rapid  [] PCR  [] Peds Viral Panel     [] Urine Sample  [] Pelvic Cultures  [x] Blood Cultures x1 #1       Yamile Collier RN  11/23/21 2040

## 2021-11-24 NOTE — FLOWSHEET NOTE
Confusion at 1545 that lasted briefly, she states she knew what she wanted to say but couldn't get the words out.

## 2021-11-25 LAB
EKG ATRIAL RATE: 92 BPM
EKG P AXIS: 75 DEGREES
EKG P-R INTERVAL: 150 MS
EKG Q-T INTERVAL: 374 MS
EKG QRS DURATION: 80 MS
EKG QTC CALCULATION (BAZETT): 462 MS
EKG R AXIS: 51 DEGREES
EKG T AXIS: 36 DEGREES
EKG VENTRICULAR RATE: 92 BPM

## 2021-11-25 PROCEDURE — 2580000003 HC RX 258: Performed by: INTERNAL MEDICINE

## 2021-11-25 PROCEDURE — 99232 SBSQ HOSP IP/OBS MODERATE 35: CPT | Performed by: STUDENT IN AN ORGANIZED HEALTH CARE EDUCATION/TRAINING PROGRAM

## 2021-11-25 PROCEDURE — 99222 1ST HOSP IP/OBS MODERATE 55: CPT | Performed by: SURGERY

## 2021-11-25 PROCEDURE — 93010 ELECTROCARDIOGRAM REPORT: CPT | Performed by: INTERNAL MEDICINE

## 2021-11-25 PROCEDURE — 6360000002 HC RX W HCPCS: Performed by: NURSE PRACTITIONER

## 2021-11-25 PROCEDURE — 6370000000 HC RX 637 (ALT 250 FOR IP): Performed by: INTERNAL MEDICINE

## 2021-11-25 PROCEDURE — 94664 DEMO&/EVAL PT USE INHALER: CPT

## 2021-11-25 PROCEDURE — 6360000002 HC RX W HCPCS: Performed by: INTERNAL MEDICINE

## 2021-11-25 PROCEDURE — 6370000000 HC RX 637 (ALT 250 FOR IP): Performed by: NURSE PRACTITIONER

## 2021-11-25 PROCEDURE — 99232 SBSQ HOSP IP/OBS MODERATE 35: CPT | Performed by: INTERNAL MEDICINE

## 2021-11-25 PROCEDURE — 94640 AIRWAY INHALATION TREATMENT: CPT

## 2021-11-25 PROCEDURE — 2060000000 HC ICU INTERMEDIATE R&B

## 2021-11-25 PROCEDURE — 2580000003 HC RX 258: Performed by: NURSE PRACTITIONER

## 2021-11-25 RX ORDER — TRAZODONE HYDROCHLORIDE 100 MG/1
100 TABLET ORAL NIGHTLY
Qty: 7 TABLET | Refills: 0 | Status: SHIPPED | OUTPATIENT
Start: 2021-11-25 | End: 2022-09-01

## 2021-11-25 RX ORDER — PANTOPRAZOLE SODIUM 40 MG/1
40 TABLET, DELAYED RELEASE ORAL 2 TIMES DAILY
Qty: 60 TABLET | Refills: 0 | Status: SHIPPED | OUTPATIENT
Start: 2021-11-25 | End: 2022-10-11

## 2021-11-25 RX ORDER — QUETIAPINE FUMARATE 25 MG/1
25 TABLET, FILM COATED ORAL 2 TIMES DAILY
Qty: 14 TABLET | Refills: 0 | Status: ON HOLD | OUTPATIENT
Start: 2021-11-25 | End: 2021-12-19 | Stop reason: HOSPADM

## 2021-11-25 RX ORDER — ONDANSETRON 2 MG/ML
4 INJECTION INTRAMUSCULAR; INTRAVENOUS EVERY 6 HOURS PRN
Status: DISCONTINUED | OUTPATIENT
Start: 2021-11-25 | End: 2021-11-30 | Stop reason: HOSPADM

## 2021-11-25 RX ADMIN — BUDESONIDE AND FORMOTEROL FUMARATE DIHYDRATE 2 PUFF: 160; 4.5 AEROSOL RESPIRATORY (INHALATION) at 19:55

## 2021-11-25 RX ADMIN — ACETAMINOPHEN 650 MG: 325 TABLET ORAL at 22:32

## 2021-11-25 RX ADMIN — ATORVASTATIN CALCIUM 40 MG: 80 TABLET, FILM COATED ORAL at 08:37

## 2021-11-25 RX ADMIN — SODIUM CHLORIDE: 9 INJECTION, SOLUTION INTRAVENOUS at 08:42

## 2021-11-25 RX ADMIN — ANTACID TABLETS 500 MG: 500 TABLET, CHEWABLE ORAL at 08:36

## 2021-11-25 RX ADMIN — QUETIAPINE FUMARATE 25 MG: 25 TABLET ORAL at 22:32

## 2021-11-25 RX ADMIN — TRAZODONE HYDROCHLORIDE 100 MG: 100 TABLET ORAL at 22:32

## 2021-11-25 RX ADMIN — METOCLOPRAMIDE 5 MG: 5 TABLET ORAL at 17:10

## 2021-11-25 RX ADMIN — LORAZEPAM 1 MG: 0.5 TABLET ORAL at 13:09

## 2021-11-25 RX ADMIN — BUSPIRONE HYDROCHLORIDE 20 MG: 10 TABLET ORAL at 13:29

## 2021-11-25 RX ADMIN — METOCLOPRAMIDE 5 MG: 5 TABLET ORAL at 11:46

## 2021-11-25 RX ADMIN — BUSPIRONE HYDROCHLORIDE 20 MG: 10 TABLET ORAL at 22:33

## 2021-11-25 RX ADMIN — TAMSULOSIN HYDROCHLORIDE 0.4 MG: 0.4 CAPSULE ORAL at 08:37

## 2021-11-25 RX ADMIN — SUCRALFATE 1 G: 1 TABLET ORAL at 08:37

## 2021-11-25 RX ADMIN — MEROPENEM 1000 MG: 1 INJECTION, POWDER, FOR SOLUTION INTRAVENOUS at 23:06

## 2021-11-25 RX ADMIN — SODIUM CHLORIDE, PRESERVATIVE FREE 20 ML: 5 INJECTION INTRAVENOUS at 22:33

## 2021-11-25 RX ADMIN — MORPHINE SULFATE 2 MG: 4 INJECTION, SOLUTION INTRAMUSCULAR; INTRAVENOUS at 08:59

## 2021-11-25 RX ADMIN — MORPHINE SULFATE 2 MG: 4 INJECTION, SOLUTION INTRAMUSCULAR; INTRAVENOUS at 02:11

## 2021-11-25 RX ADMIN — SODIUM CHLORIDE, PRESERVATIVE FREE 10 ML: 5 INJECTION INTRAVENOUS at 08:38

## 2021-11-25 RX ADMIN — ACETAMINOPHEN 650 MG: 325 TABLET ORAL at 17:10

## 2021-11-25 RX ADMIN — METOCLOPRAMIDE 5 MG: 5 TABLET ORAL at 05:49

## 2021-11-25 RX ADMIN — BUSPIRONE HYDROCHLORIDE 20 MG: 10 TABLET ORAL at 08:37

## 2021-11-25 RX ADMIN — ANTACID TABLETS 500 MG: 500 TABLET, CHEWABLE ORAL at 13:09

## 2021-11-25 RX ADMIN — SUCRALFATE 1 G: 1 TABLET ORAL at 17:10

## 2021-11-25 RX ADMIN — POLYETHYLENE GLYCOL 3350 17 G: 17 POWDER, FOR SOLUTION ORAL at 08:36

## 2021-11-25 RX ADMIN — ENOXAPARIN SODIUM 30 MG: 100 INJECTION SUBCUTANEOUS at 08:40

## 2021-11-25 RX ADMIN — LORAZEPAM 1 MG: 0.5 TABLET ORAL at 22:33

## 2021-11-25 RX ADMIN — ANTACID TABLETS 500 MG: 500 TABLET, CHEWABLE ORAL at 22:33

## 2021-11-25 RX ADMIN — ONDANSETRON 4 MG: 2 INJECTION INTRAMUSCULAR; INTRAVENOUS at 22:33

## 2021-11-25 RX ADMIN — QUETIAPINE FUMARATE 25 MG: 25 TABLET ORAL at 08:37

## 2021-11-25 RX ADMIN — SUCRALFATE 1 G: 1 TABLET ORAL at 13:29

## 2021-11-25 RX ADMIN — MEROPENEM 1000 MG: 1 INJECTION, POWDER, FOR SOLUTION INTRAVENOUS at 11:46

## 2021-11-25 RX ADMIN — SUCRALFATE 1 G: 1 TABLET ORAL at 22:32

## 2021-11-25 RX ADMIN — AMITRIPTYLINE HYDROCHLORIDE 25 MG: 25 TABLET, FILM COATED ORAL at 22:33

## 2021-11-25 ASSESSMENT — ENCOUNTER SYMPTOMS
CHOKING: 0
EYE REDNESS: 0
VOMITING: 0
COUGH: 0
VOICE CHANGE: 0
DIARRHEA: 0
SINUS PAIN: 0
COLOR CHANGE: 0
SINUS PRESSURE: 0
SHORTNESS OF BREATH: 0
EYE PAIN: 0
CONSTIPATION: 0
FACIAL SWELLING: 0
EYE ITCHING: 0
WHEEZING: 0
PHOTOPHOBIA: 0
EYE DISCHARGE: 0

## 2021-11-25 ASSESSMENT — PAIN SCALES - GENERAL
PAINLEVEL_OUTOF10: 7
PAINLEVEL_OUTOF10: 6
PAINLEVEL_OUTOF10: 4
PAINLEVEL_OUTOF10: 6
PAINLEVEL_OUTOF10: 4

## 2021-11-25 NOTE — PROGRESS NOTES
[facility]  Office: [address], DO, Bala Zaira, DO, Aimeefady Jimenez, DO, Sharona Henryradha Jensen, DO, Cate Lala MD, Wayne Zhou MD, Rashmi Alfonso MD, Germán Langford MD, Payton Carmona MD, Pranav Haque MD, Angie Willis MD, Alondra Benavidez, DO, Adela Jackson, DO, Bruno Yang MD,  Marcie Marinelli DO, Dietrich Halsted, MD, Michaela Ruth MD, Augustina Coyle MD, Yany Reagan MD, Carmenza Marrufo MD, Jason Bean MD, Magda Leahy MD, Nate Bland [facility], [facility], CNP, Chuy Luz, CNP, Janet Diaz, CNS, Jenelle Barclay, CNP, Ricarda Calvin, CNP, Filiberto Gil, CNP, Justo Negron, CNP, Elmira Dominguez, CNP, Francesca Granda, PA-C, Santi Singh, [facility], Josephine Michaud, CNP, Ashutosh Menjivar, CNP, Marguerite Valdez CNP, Júnior Huynh, [facility], Juan Francisco Mcleod, CNP, Reba Wood, CNP, [place] [facility], [address] [place] [address]    Progress Note    11/25/2021    9:01 AM    Name:   Nikki Berry  MRN:     [id number]     Acct:      [de-identified]   Room:   [address] [place] [address] Day:  2  Admit Date:  11/23/2021  8:12 PM    PCP:   Arlene Diaz MD  Code Status:  Full Code    Subjective:     C/C:   Chief Complaint   Patient presents with    Abdominal Pain    Altered Mental Status     Interval History Status: improved. Patient seen examined bedside. Tolerating pudding no acute issues overnight overall feeling well did discuss with patient EGD results as well as MRI results. Brief History:   49-year-old female past medical history of anxiety, depression, prior history of Nissen fundoplication presented with fevers chills nausea vomiting abdominal pain as well as difficulty speaking continued abdominal pain. GI and general surgery were consulted as well as infectious disease. Patient also being followed by neurology due to concern of altered mental status. Patient underwent EGD and biopsy on 11/24/2021 showing:  Findings:   1.  The esophagus was tortuous with dilated distal third. 2. The GE junction was noted at 38 cm. 3. The GE junction compliant to the passage of scope without evidence of stricture or stenosis. 4. Mild reflux esophagitis was noted at the GE junction. 5. Sarah-Abrams tear. 6. The mucosa in the fundus, cardia and proximal gastric body appeared severely congested, inflamed with diffuse erythema, and granularity. Biopsies were performed for histology. 7. The distal gastric body antrum and pylorus appeared normal.  8. The examined duodenum appeared normal.     Recommendations:  1. Await pathology results. 2. Continue IV pantoprazole 40 mg twice daily for 2 days then change to oral once daily thereafter. 3. Start full liquid diet and advance as tolerated. 4. Patient's nausea vomiting most likely due to infectious (viral) vs medication gastritis. This will resolve with supportive care and antiemetics. 5. Gastric distention noted on CT is most likely due to intractable nausea vomiting. Due to recurrent nausea vomiting. 6. Continue supportive care. Once patient is able to tolerate diet she can be discharged home. 7. Patient to follow-up with primary GI: Dr Fred Espino    MRI 11/24/2021:  Impression   1. No acute intracranial abnormality, specifically no evidence of acute   infarct. 2. Minimal microvascular ischemic disease. Review of Systems:     Constitutional:  negative for chills, fevers, sweats  Respiratory:  negative for cough, dyspnea on exertion, shortness of breath, wheezing  Cardiovascular:  negative for chest pain, chest pressure/discomfort, lower extremity edema, palpitations  Gastrointestinal:  negative for abdominal pain, constipation, diarrhea, nausea, vomiting  Neurological:  negative for dizziness, headache    Medications: Allergies:     Allergies   Allergen Reactions    Prednisone Anxiety    Compazine [Prochlorperazine Maleate]     Penicillin V Potassium     Penicillins Other (See Comments)     Shock- received meropenem and ceftriaxone wo issues 2020       Current Meds:   Scheduled Meds:    meropenem  1,000 mg IntraVENous Q12H    sodium chloride flush  5-40 mL IntraVENous 2 times per day    enoxaparin  30 mg SubCUTAneous Daily    busPIRone  20 mg Oral TID    amitriptyline  25 mg Oral Nightly    budesonide-formoterol  2 puff Inhalation BID    metoclopramide  5 mg Oral TID AC    polyethylene glycol  17 g Oral Every Other Day    QUEtiapine  25 mg Oral BID    atorvastatin  40 mg Oral Daily    sucralfate  1 g Oral 4x Daily    traZODone  100 mg Oral Nightly    tamsulosin  0.4 mg Oral Daily    fentanNYL  25 mcg IntraVENous Once     Continuous Infusions:    sodium chloride      sodium chloride 100 mL/hr at 11/25/21 0842     PRN Meds: sodium chloride flush, sodium chloride, acetaminophen **OR** acetaminophen, LORazepam, calcium carbonate, magnesium hydroxide, morphine    Data:     Past Medical History:   has a past medical history of Anxiety, Arthritis, Back pain, Bronchitis, Caffeine use, Carpal tunnel syndrome, Cataracts, bilateral, Constipation, Depression, Diarrhea, GERD (gastroesophageal reflux disease), H/O gastric ulcer, Hyperlipidemia, Hypertension, Mumps, MVP (mitral valve prolapse), Recurrent UTI, Sinusitis, Tracheostomy in place [facility]), Ulcerative esophagitis, and Wellness examination. Social History:   reports that she has quit smoking. Her smoking use included cigarettes. She has a 50.00 pack-year smoking history. She has never used smokeless tobacco. She reports that she does not drink alcohol and does not use drugs.      Family History:   Family History   Problem Relation Age of Onset   [facility] Cancer Mother         uterine    Heart Attack Mother     Heart Attack Father     Other Maternal Grandfather         foot gangrene    Other Paternal Grandmother         bleeding ulcers    Other Paternal Grandfather         lung cancer       Vitals:  BP (!) 105/56   Pulse 90   Temp 98.5 °F (36.9 °C) CONTRAST    Result Date: 11/24/2021  1. Basilar opacities, right greater than left, with central airway congestion and filling of the peripheral subsegmental bronchi in the right lower lobe. Overall these features are suggestive of chronic/recurrent aspiration. 2.  Hiatal hernia with diffuse mild esophageal dilatation with fluid, which may represent reflux or stasis. 3.  Emphysema. CT ABDOMEN PELVIS W IV CONTRAST Additional Contrast? None    Result Date: 11/23/2021  Moderate hiatal hernia with a fluid-filled patulous distal esophagus which is similar to prior comparison exams. Distended stomach with a normal caliber duodenum. Query possible gastroparesis or functional outlet obstruction. Possible findings of proctitis. Correlate clinically. Hepatomegaly with steatosis, unchanged. Findings in the dependent lung bases which are suspicious for sequelae of aspiration pneumonia in the appropriate clinical setting. XR CHEST PORTABLE    Result Date: 11/23/2021  Chronic lung changes with right basilar airspace opacity concerning for underlying pneumonia in the appropriate clinical setting. Correlate with aspiration risk. CTA HEAD NECK W CONTRAST    Result Date: 11/23/2021  No large vessel occlusion visualized within the head or neck. Incidentally noted pulmonary emphysema and dilated, air and fluid-filled esophagus. MRI BRAIN WO CONTRAST    Result Date: 11/24/2021  1. No acute intracranial abnormality, specifically no evidence of acute infarct. 2. Minimal microvascular ischemic disease.        Physical Examination:        General appearance:  alert, cooperative and no distress  Mental Status:  oriented to person, place and time and normal affect  Lungs:  clear to auscultation bilaterally, normal effort  Heart:  regular rate and rhythm, no murmur  Abdomen:  soft, nontender, nondistended, normal bowel sounds  Extremities:  no edema, redness, tenderness in the calves  Skin:  no gross lesions, rashes, induration    Assessment:        Hospital Problems           Last Modified POA    * (Principal) Expressive aphasia 11/24/2021 Yes    GERD (gastroesophageal reflux disease) 11/24/2021 Yes    Depression 11/24/2021 Yes    Fever 11/24/2021 Yes    Gastric outlet obstruction 11/24/2021 Yes    Hypertension 11/24/2021 Yes    S/P Nissen fundoplication (without gastrostomy tube) procedure 11/24/2021 Yes          Plan:        1. Expressive aphasia. Appreciate neurology recommendations. MRI showing no acute intracranial abnormalities, expressive aphasia has resolved. Follow-up with neurology as an outpatient. 2. Esophagitis, Sarah-Abrams tear, gastritis status post EGD 11/24/2021. Appreciate GI recommendations. IV Protonix. Be changed to p.o. daily afterwards. Advance diet slowly as tolerated currently on soft diet as she has tolerated full liquid. Follow-up outpatient with GI as well as bariatric surgery. 3. Remove Saldana check PVR  4. Currently on meropenem. Cloteal Killian His disease recommendations. No growth 2 days from blood cultures, growth on urine  5. Depression anxiety. Continue home medications of BuSpar, Seroquel, trazodone  6. Discussed with patient results of MRI and EGD and barriers to discharge  7. .  Discharge planning.     Magalis Suero MD  11/25/2021  9:01 AM

## 2021-11-25 NOTE — PROGRESS NOTES
Pt brought to room via ER staff on a stretcher. Vitals obtained, oriented pt to room and questions answered. Paged primary to make aware pt to room and is very anxious.

## 2021-11-25 NOTE — PROGRESS NOTES
General Surgery:  Daily Progress Note                  PATIENT NAME: Ade Madrigal     TODAY'S DATE: 11/25/2021, 8:25 AM  CC:  abd pain     SUBJECTIVE:     Pt seen and examined at bedside. No acute events overnight. Remained NPO overnight. Continues to have some nausea, no emesis for >1week. +Flatus. States abd pain has improved since admission. VSS, afebrile. OBJECTIVE:   VITALS:  BP (!) 105/56   Pulse 90   Temp 98.5 °F (36.9 °C) (Oral)   Resp 19   Ht 5' 2\" (1.575 m)   Wt 170 lb (77.1 kg)   SpO2 92%   BMI 31.09 kg/m²      INTAKE/OUTPUT:      Intake/Output Summary (Last 24 hours) at 11/25/2021 0825  Last data filed at 11/25/2021 0456  Gross per 24 hour   Intake 1497 ml   Output 2025 ml   Net -528 ml       PHYSICAL EXAM:  General Appearance:  awake, alert, oriented, in no acute distress  HEENT:  Normocephalic, atraumatic, mucus membranes moist   Skin:  Skin color, texture, turgor normal. No rashes or lesions. Lungs:  Equal chest rise bilaterally   Heart:  Heart regular rate and rhythm  Abdomen:   soft, ND, NTTP  Extremities: Extremities warm to touch, pink, with no edema.       IV Access:  PIV    Data:  CBC:   Lab Results   Component Value Date    WBC 7.9 11/24/2021    RBC 4.30 11/24/2021    HGB 12.3 11/24/2021    HCT 38.1 11/24/2021    MCV 88.6 11/24/2021    MCH 28.6 11/24/2021    MCHC 32.3 11/24/2021    RDW 16.8 11/24/2021     11/24/2021    MPV 10.8 11/24/2021     BMP:    Lab Results   Component Value Date     11/24/2021    K 3.9 11/24/2021     11/24/2021    CO2 24 11/24/2021    BUN 13 11/24/2021    LABALBU 3.4 11/24/2021    CREATININE 1.21 11/24/2021    CALCIUM 8.8 11/24/2021    GFRAA 52 11/24/2021    LABGLOM 43 11/24/2021    GLUCOSE 103 11/24/2021       Radiology Review:  No new images     ASSESSMENT:  Active Hospital Problems    Diagnosis Date Noted    Expressive aphasia [R47.01] 12/14/2020    Gastric outlet obstruction [K31.1] 05/12/2020    S/P Nissen fundoplication (without gastrostomy tube) procedure [Z98.890] 05/12/2020    Hypertension [I10]     Fever [R50.9]     Depression [F32. A]     GERD (gastroesophageal reflux disease) [K21.9]        68 y.o. female with gastritis, josefa shante tear, and dilated distal esophagus on EGD    Plan:  1. Continue FLD. Advance as tolerated  2. Continue PPI  3. Continue bowel regimen  4. No indication for acute surgical intervention  5. Recommend patient to follow up in clinic as outpatient.     Electronically signed by Devante Mera DO  on 11/25/2021 at 8:25 AM

## 2021-11-25 NOTE — PROGRESS NOTES
NEUROLOGY INPATIENT PROGRESS NOTE      11/25/2021     Briefly, Francis Hinton is a  68 y.o. female admitted on 11/23/2021 with speech difficulty and abdominal pain and was admitted to hospital for the management of small bowel obstruction. Neurology were consulted for the transient episode of aphasia. She described that since the day prior to admission she was having difficulty speaking without difficulty thinking or comprehending. On presentation her symptoms improved and NIH was 0. CT head and CTA of the head and neck were unremarkable for acute infarction or occlusion as well as MRI of the brain. Pertinent labs on presentation: Sodium 1/30/2012, BUN 17, creatinine 1.18, lactic acid 2.3, troponin 29 > 57, glucose 132. Urinalysis was negative for UTI, specific gravity 1.05        Subjective: no acute events overnight. No episode of aphasia since admission       No current facility-administered medications on file prior to encounter. Current Outpatient Medications on File Prior to Encounter   Medication Sig Dispense Refill    bisacodyl (DULCOLAX) 10 MG suppository Place 10 mg rectally daily as needed for Constipation      Sodium Phosphates (FLEET) 7-19 GM/118ML Place 1 enema rectally As needed after no BM for 5 days      clotrimazole-betamethasone (LOTRISONE) 1-0.05 % cream Apply topically 2 times daily Apply topically 2 times daily.  magnesium hydroxide (MILK OF MAGNESIA) 400 MG/5ML suspension Take by mouth daily as needed for Constipation      polyethylene glycol (GLYCOLAX) 17 g packet Take 17 g by mouth every other day      metoclopramide (REGLAN) 5 MG tablet Take 5 mg by mouth 3 times daily (before meals) For nausea      budesonide-formoterol (SYMBICORT) 160-4.5 MCG/ACT AERO Inhale 2 puffs into the lungs 2 times daily      gabapentin (NEURONTIN) 300 MG capsule Take 1 capsule by mouth 3 times daily for 30 days.  90 capsule 0    QUEtiapine (SEROQUEL) 25 MG tablet Take 1 tablet by mouth 2 times daily 60 tablet 3    ferrous sulfate (FE TABS 325) 325 (65 Fe) MG EC tablet Take 1 tablet by mouth daily (with breakfast) 90 tablet 3    furosemide (LASIX) 20 MG tablet Take 1 tablet by mouth daily 60 tablet 3    gabapentin (NEURONTIN) 300 MG capsule Take 1 capsule by mouth 3 times daily for 30 days. 90 capsule 0    clonazePAM (KLONOPIN) 0.5 MG tablet Take 0.5 tablets by mouth 2 times daily for 60 days. Taking 1/2 tablet BID 5 tablet 0    pantoprazole (PROTONIX) 40 MG tablet Take 1 tablet by mouth 2 times daily      amitriptyline (ELAVIL) 25 MG tablet Take 1 tablet by mouth nightly      busPIRone (BUSPAR) 10 MG tablet Take 2 tablets by mouth 3 times daily      metoprolol succinate (TOPROL XL) 25 MG extended release tablet Take 1 tablet by mouth daily 30 tablet 3    docusate (COLACE) 50 MG/5ML liquid 10 mLs by Per G Tube route 2 times daily 100 mL 2    sucralfate (CARAFATE) 1 GM tablet Take 1 tablet by mouth 4 times daily 120 tablet 3    traZODone (DESYREL) 100 MG tablet Take 1 tablet by mouth nightly 30 tablet 0    RA VITAMIN D-3 50 MCG (2000 UT) CAPS take 1 capsule by mouth once daily      Oyster Shell 500 MG TABS Take 500 mg by mouth 2 times daily       calcium carbonate (TUMS) 500 MG chewable tablet Take 1 tablet by mouth 3 times daily as needed for Heartburn      simvastatin (ZOCOR) 40 MG tablet Take 40 mg by mouth nightly. Allergies: Tye Hoang is allergic to prednisone, compazine [prochlorperazine maleate], penicillin v potassium, and penicillins.     Past Medical History:   Diagnosis Date    Anxiety     Arthritis     Back pain     Bronchitis     Caffeine use     8 coffee / day    Carpal tunnel syndrome     Cataracts, bilateral     Constipation     Depression     Diarrhea     GERD (gastroesophageal reflux disease)     H/O gastric ulcer     Hyperlipidemia     Hypertension     Mumps     MVP (mitral valve prolapse)     Dr. Arnold [place] in May 2019    Recurrent UTI     Dr. Maldonado Arabia    Sinusitis     Tracheostomy in place [facility])     Ulcerative esophagitis     Wellness examination     Dr. Chula Bridges seen in Jan 2020       Past Surgical History:   Procedure Laterality Date    APPENDECTOMY      KRISTI Zavaleta 79, LAPAROSCOPIC N/A 05/22/2020    XI LAPAROSCOPIC ROBOTIC CHOLECYSTECTOMY, PYLOROPLASTY performed by Mary Cevallos MD at [facility]  05/21/2020    with stent insertion, balloon dilation, sphinctereotomy    ERCP  05/21/2020    ** CASE IN OR WITY GI STAFF** ERCP STENT INSERTION performed by Ranjit Knapp MD at [facility]    ERCP  05/21/2020    ** CASE IN OR WITH GI STAFF**ERCP SPHINCTER/PAPILLOTOMY performed by [facility]jit Knapp MD at [facility]    ERCP  05/21/2020    ** CASE IN OR WITH GI STAFF**ERCP DILATION BALLOON performed by Ranjit Knapp MD at [facility]    ERCP N/A 2/8/2021    ERCP STENT REMOVAL performed by Joey Wiley MD at [facility]    ERCP  2/8/2021    ERCP STONE REMOVAL performed by Joey Wiley MD at [facility]    GASTRIC FUNDOPLICATION N/A 73/33/0405    XI LAPAROSCOPIC ROBOTIC HIATAL HERNIA REPAIR, CHERYL FUNDOPLICATION performed by Mary Cevallos MD at [facility] N/A 03/27/2020    EGD PEG TUBE PLACEMENT performed by Mary Cevallos MD at [facility] N/A 2/8/2021    **CASE IN O.R. W/ GI STAFF - EGD WITH REMOVAL PEG TUBE performed by Joey Wiley MD at [address], [facility]. CATH POWER PICC TRIPLE  03/04/2020         HYSTERECTOMY      Abdominal    JOINT REPLACEMENT Right     right hip    OVARY REMOVAL      RHINOPLASTY      TONSILLECTOMY      TOTAL HIP ARTHROPLASTY      TOTAL NEPHRECTOMY      atrophic from infections, age 13   Waldron Solders TRACHEOSTOMY N/A 03/27/2020    **ADD ON **WANTS 10:00AM **TRACHEOTOMY performed by Mary Cevallos MD at [address] [place] [address] TRACHEOSTOMY N/A 04/02/2020    TRACHEOTOMY EXCHANGE performed by Renata Carson MD at [address] ENDOSCOPY N/A 03/17/2020    BEDSIDE EGD ESOPHAGOGASTRODUODENOSCOPY (ICU) performed by Renata Carson MD at [address] [place] [address] N/A 05/18/2020    EGD BIOPSY performed by Go Olivier MD at [address] [place] [address]  05/18/2020    EGD DILATION BALLOON performed by Go Olivier MD at [address] [place] [address] N/A 07/06/2020    ** CASE IN O.R. WITH GI STAFF** EGD ESOPHAGOGASTRODUODENOSCOPY performed by Kimberley Mckeon MD at [address] [place] [address] N/A 11/09/2020    EGD DIAGNOSTIC ONLY performed by Karissa Rosen MD at [address] [place] [address]  02/08/2021    - EGD WITH REMOVAL PEG TUBE,ERCP STENT REMOVAL,ERCP STONE REMOVAL       Medications:     meropenem  1,000 mg IntraVENous Q12H    sodium chloride flush  5-40 mL IntraVENous 2 times per day    enoxaparin  30 mg SubCUTAneous Daily    busPIRone  20 mg Oral TID    amitriptyline  25 mg Oral Nightly    budesonide-formoterol  2 puff Inhalation BID    metoclopramide  5 mg Oral TID AC    polyethylene glycol  17 g Oral Every Other Day    QUEtiapine  25 mg Oral BID    atorvastatin  40 mg Oral Daily    sucralfate  1 g Oral 4x Daily    traZODone  100 mg Oral Nightly    tamsulosin  0.4 mg Oral Daily    fentanNYL  25 mcg IntraVENous Once     PRN Meds include: sodium chloride flush, sodium chloride, acetaminophen **OR** acetaminophen, LORazepam, calcium carbonate, magnesium hydroxide, morphine    Objective:   BP (!) 105/56   Pulse 90   Temp 98.5 °F (36.9 °C) (Oral)   Resp 19   Ht 5' 2\" (1.575 m)   Wt 170 lb (77.1 kg)   SpO2 92%   BMI 31.09 kg/m²     Blood pressure range: Systolic (91DMX), EJX:677 , Min:73 , OZF:491   ; Diastolic (21JXU), NQM:67, Min:34, Max:86      ROS:  Review of Systems   Constitutional: Positive for appetite change. Negative for activity change, chills, diaphoresis, fatigue, fever and unexpected weight change. HENT: Negative for ear discharge, ear pain, facial swelling, sinus pressure, sinus pain and voice change. Eyes: Negative for photophobia, pain, discharge, redness, itching and visual disturbance. Respiratory: Negative for cough, choking, shortness of breath and wheezing. Cardiovascular: Negative for chest pain, palpitations and leg swelling. Gastrointestinal: Negative for constipation, diarrhea and vomiting. Endocrine: Negative for polydipsia and polyuria. Skin: Negative for color change and rash. Neurological: Negative for dizziness, tremors, seizures, syncope, facial asymmetry, speech difficulty, weakness, light-headedness, numbness and headaches. Psychiatric/Behavioral: Negative for agitation, behavioral problems, confusion, sleep disturbance and suicidal ideas. The patient is not nervous/anxious and is not hyperactive. NEUROLOGIC EXAMINATION  Physical Exam  Constitutional:       Appearance: Normal appearance. HENT:      Head: Normocephalic and atraumatic. Mouth/Throat:      Mouth: Mucous membranes are moist.   Eyes:      Extraocular Movements: Extraocular movements intact. Pupils: Pupils are equal, round, and reactive to light. Cardiovascular:      Rate and Rhythm: Normal rate and regular rhythm. Pulmonary:      Effort: Pulmonary effort is normal.      Breath sounds: Normal breath sounds. Abdominal:      General: Abdomen is flat. Palpations: Abdomen is soft. Musculoskeletal:      Cervical back: Normal range of motion and neck supple. Skin:     General: Skin is warm and dry. Neurological:      General: No focal deficit present. Mental Status: She is alert and oriented to person, place, and time. GCS: GCS eye subscore is 4. GCS verbal subscore is 5. GCS motor subscore is 6.       Cranial Nerves: No cranial nerve deficit, dysarthria or facial asymmetry. Sensory: No sensory deficit. Motor: No weakness, tremor, atrophy, abnormal muscle tone, seizure activity or pronator drift. Coordination: Coordination normal. Heel to Shin Test normal.      Deep Tendon Reflexes: Babinski sign absent on the right side. Babinski sign absent on the left side. Reflex Scores:       Tricep reflexes are 2+ on the right side and 2+ on the left side. Bicep reflexes are 2+ on the right side and 2+ on the left side. Brachioradialis reflexes are 2+ on the right side and 2+ on the left side. Patellar reflexes are 2+ on the right side and 2+ on the left side. Achilles reflexes are 2+ on the right side and 2+ on the left side. .  Neurologic Exam     Mental Status   Oriented to person, place, and time. Cranial Nerves     CN III, IV, VI   Pupils are equal, round, and reactive to light. Gait, Coordination, and Reflexes     Reflexes   Right brachioradialis: 2+  Left brachioradialis: 2+  Right biceps: 2+  Left biceps: 2+  Right triceps: 2+  Left triceps: 2+  Right patellar: 2+  Left patellar: 2+  Right achilles: 2+  Left achilles: 2+       Lab Results:   CBC:   Recent Labs     11/23/21 2043 11/24/21  0631   WBC 8.9 7.9   HGB 13.4 12.3    155     BMP:    Recent Labs     11/23/21 2043 11/23/21  2101 11/24/21  0631   *  --  136   K 4.2  --  3.9   CL 98  --  102   CO2 20  --  24   BUN 17  --  13   CREATININE 1.18* 1.44* 1.21*   GLUCOSE 132*  --  103*         Lab Results   Component Value Date    CHOL 203 (H) 12/14/2020    LDLCHOLESTEROL 106 12/14/2020    HDL 43 12/14/2020    TRIG 268 (H) 12/14/2020    ALT 15 11/24/2021    AST 15 11/24/2021    INR 1.1 11/24/2021    LABA1C 5.0 12/14/2020       No results found for: PHENYTOIN, PHENYTOIN, VALPROATE, CBMZ    IMAGING  CTA H/N W CONTRAST   No large vessel occlusion visualized within the head or neck.    Incidentally noted pulmonary

## 2021-11-25 NOTE — ANESTHESIA POSTPROCEDURE EVALUATION
Department of Anesthesiology  Postprocedure Note    Patient: Brady Carter  MRN: [id number]  YOB: 1945  Date of evaluation: 11/24/2021  Time:  9:23 PM     Procedure Summary     Date: 11/24/21 Room / Location: [address] [place] [address]    Anesthesia Start: 1625 Anesthesia Stop: 1656    Procedure: EGD BIOPSY (N/A ) Diagnosis: (abdominal pain)    Surgeons: Darlyne Sacks, MD Responsible Provider: Sara Francisco MD    Anesthesia Type: MAC ASA Status: 4          Anesthesia Type: MAC    Alhaji Phase I: Alhaji Score: 9    Alhaji Phase II: Alhaji Score: 10    Last vitals: Reviewed and per EMR flowsheets.        Anesthesia Post Evaluation    Patient location during evaluation: PACU  Patient participation: complete - patient participated  Level of consciousness: awake and alert  Airway patency: patent  Nausea & Vomiting: no nausea and no vomiting  Complications: no  Cardiovascular status: blood pressure returned to baseline  Respiratory status: acceptable  Hydration status: euvolemic

## 2021-11-25 NOTE — PLAN OF CARE
GI plan of care    Patient is tolerating full liquid diet  Continue bowel regimen daily with MiraLAX and Colace  Continue Protonix IV 40 mg twice daily today then may change to Protonix 40 mg p.o. once daily 30 minutes before breakfast  Patient will need to follow-up with Dr. Tommy Boyer in the office for ongoing care    GI will sign off  Sagrario Shukla CNP

## 2021-11-25 NOTE — CONSULTS
Bariatric Surgery:  Consult Note                  PATIENT NAME: Ijeoma Dunbar     TODAY'S DATE: 11/25/2021, 8:25 AM  CC:  abd pain     SUBJECTIVE:     49-year-old female who has an extremely complicated past medical and surgical history including a Mark fundoplication in February 2020 with extended hospitalization, tracheostomy and percutaneous endoscopic gastrostomy tube placement which has since been removed. Patient presented to ED on 11/24 with abd pain and aphasia. CT A/P with recurrent hiatal hernia. GI consulted and went for EGD yesterday showing esophageal dilation, gastritis and josefa shante tear. Recommended FLD, advance as tolerated and outpatient F/U. Pt seen and examined at bedside. No acute events overnight. Remained NPO overnight. Continues to have some nausea, no emesis for >1week. +Flatus. States abd pain has improved since admission. VSS, afebrile. OBJECTIVE:   VITALS:  BP (!) 105/56   Pulse 90   Temp 98.5 °F (36.9 °C) (Oral)   Resp 19   Ht 5' 2\" (1.575 m)   Wt 170 lb (77.1 kg)   SpO2 92%   BMI 31.09 kg/m²      INTAKE/OUTPUT:      Intake/Output Summary (Last 24 hours) at 11/25/2021 0825  Last data filed at 11/25/2021 0456  Gross per 24 hour   Intake 1497 ml   Output 2025 ml   Net -528 ml       PHYSICAL EXAM:  General Appearance:  awake, alert, oriented, in no acute distress  HEENT:  Normocephalic, atraumatic, mucus membranes moist   Skin:  Skin color, texture, turgor normal. No rashes or lesions. Lungs:  Equal chest rise bilaterally   Heart:  Heart regular rate and rhythm  Abdomen:   soft, ND, NTTP  Extremities: Extremities warm to touch, pink, with no edema.       IV Access:  PIV    Data:  CBC:   Lab Results   Component Value Date    WBC 7.9 11/24/2021    RBC 4.30 11/24/2021    HGB 12.3 11/24/2021    HCT 38.1 11/24/2021    MCV 88.6 11/24/2021    MCH 28.6 11/24/2021    MCHC 32.3 11/24/2021    RDW 16.8 11/24/2021     11/24/2021    MPV 10.8 11/24/2021     BMP:    Lab Results Component Value Date     11/24/2021    K 3.9 11/24/2021     11/24/2021    CO2 24 11/24/2021    BUN 13 11/24/2021    LABALBU 3.4 11/24/2021    CREATININE 1.21 11/24/2021    CALCIUM 8.8 11/24/2021    GFRAA 52 11/24/2021    LABGLOM 43 11/24/2021    GLUCOSE 103 11/24/2021       Radiology Review:  No new images     ASSESSMENT:  Active Hospital Problems    Diagnosis Date Noted    Expressive aphasia [R47.01] 12/14/2020    Gastric outlet obstruction [K31.1] 05/12/2020    S/P Nissen fundoplication (without gastrostomy tube) procedure [Z98.890] 05/12/2020    Hypertension [I10]     Fever [R50.9]     Depression [F32. A]     GERD (gastroesophageal reflux disease) [K21.9]        68 y.o. female with gastritis, josefa shante tear, and dilated distal esophagus on EGD    Plan:  1. Continue FLD. Advance as tolerated  2. Continue PPI  3. Continue bowel regimen  4. No indication for acute surgical intervention  5. Recommend patient to follow up in clinic as outpatient.  Bariatrics to sign off    Electronically signed by Austin Mariee DO  on 11/25/2021 at 8:25 AM

## 2021-11-25 NOTE — PROGRESS NOTES
[facility] - Progress Note    Today's Date and Time: 11/25/2021, 1:02 PM    Impression :   · Recurrent Aspiration pneumonia  · Hiatal hernia  · Distal esophageal dilatation with stasis  · Anxiety  · Hx of MDRO, ESBL + bacteria    Recommendations:   · Continue meropenem. · Descalate with culture results    Medical Decision Making/Summary/Discussion:   ·   Infection Control Recommendations   · [place] Precautions  · Contact Isolation Hx Klebsiella ESBL +    Antimicrobial Stewardship Recommendations     · Simplification of therapy  · Targeted therapy    Coordination of Outpatient Care:   · Estimated Length of IV antimicrobials:TBD  · Patient will need Midline Catheter Insertion: no  · Patient will need PICC line Insertion:No  · Patient will need: Home IV , [place],  SNF,  LTAC: TBD  · Patient will need outpatient wound care:No    Chief complaint/reason for consultation:   · Aspiration pneumonia      History of Present Illness:   Yasmin Cunningham is a 68y.o.-year-old  female who was initially admitted on 11/23/2021. Patient seen at the request of Dr. Angella Winslow    [facility] of anxiety/depression    INITIAL HISTORY:    Patient  presented with a fever, T 103, and 3 weeks of nausea/vomiting and abdominal pain, constipation x 5 days, expressive aphasia. Denied any headache or weakness in her upper or lower extremities. At the ED, she was Saturating on room air,   Pertinent labs include Cr 1.18, lactic acid 2.3, wbc 8.9,     CT chest concerning for chronic recurrent aspiration pneumonia. Hiatal hernia, distal esophageal distension with stasis of refluxed fluid. ID consulted      CURRENT EVALUATION: 11/25/21     Patient underwent EGD with findings of:  · Tortuous esophagus with dilated distal third. · Evidence of prior fundoplication which appeared to be partially undone  · Sarah-Abrams tear.   · Severely congested and inflamed appearing proximal stomach  · Normal distal stomach  · Normal examined duodenum     Afebrile  VS stable    On room air  RR 14-->19  02 sat 93        Labs:    BUN:13  Creatinine: 1.21    Hgb: 12.3  Platelet: 348  WBC: 7.9    Lactic acid 2.3-->0.9    Cultures:  Urine:  · 11/23/2021: No growth  Blood:  · 11/23/2021 x 2 No growth  Sputum :  ·   Wound:     CSF     CT chest 11/24/2021    CT Head WO Contrast    Result Date: 11/23/2021  No CT evidence of an acute infarct. The findings were sent to the Radiology Results [address] at 8:54 pm on 11/23/2021to be communicated to a licensed caregiver. CT CHEST WO CONTRAST    Result Date: 11/24/2021  1. Basilar opacities, right greater than left, with central airway congestion and filling of the peripheral subsegmental bronchi in the right lower lobe. Overall these features are suggestive of chronic/recurrent aspiration. 2.  Hiatal hernia with diffuse mild esophageal dilatation with fluid, which may represent reflux or stasis. 3.  Emphysema. CT ABDOMEN PELVIS W IV CONTRAST Additional Contrast? None    Result Date: 11/23/2021  Moderate hiatal hernia with a fluid-filled patulous distal esophagus which is similar to prior comparison exams. Distended stomach with a normal caliber duodenum. Query possible gastroparesis or functional outlet obstruction. Possible findings of proctitis. Correlate clinically. Hepatomegaly with steatosis, unchanged. Findings in the dependent lung bases which are suspicious for sequelae of aspiration pneumonia in the appropriate clinical setting. XR CHEST PORTABLE    Result Date: 11/23/2021  Chronic lung changes with right basilar airspace opacity concerning for underlying pneumonia in the appropriate clinical setting. Correlate with aspiration risk. CTA HEAD NECK W CONTRAST    Result Date: 11/23/2021  No large vessel occlusion visualized within the head or neck. Incidentally noted pulmonary emphysema and dilated, air and fluid-filled esophagus.      Discussed with GASTROSTOMY TUBE PLACEMENT N/A 03/27/2020    EGD PEG TUBE PLACEMENT performed by Mary Michel MD at [facility] N/A 2/8/2021    **CASE IN O.R. W/ GI STAFF - EGD WITH REMOVAL PEG TUBE performed by Deann Smart MD at [facility]    HAND SURGERY      [facility]. CATH POWER PICC TRIPLE  03/04/2020         HYSTERECTOMY      Abdominal    JOINT REPLACEMENT Right     right hip    OVARY REMOVAL      RHINOPLASTY      TONSILLECTOMY      TOTAL HIP ARTHROPLASTY      TOTAL NEPHRECTOMY      atrophic from infections, age 14    TRACHEOSTOMY N/A 03/27/2020    **ADD ON **WANTS 10:00AM **TRACHEOTOMY performed by Mary Michel MD at [address] [facility] 04/02/2020    TRACHEOTOMY EXCHANGE performed by Mary Michel MD at [address] [place] [address] N/A 03/17/2020    BEDSIDE EGD ESOPHAGOGASTRODUODENOSCOPY (ICU) performed by Mary Michel MD at [address] [place] [address] N/A 05/18/2020    EGD BIOPSY performed by Tammy Gonzalez MD at [address] [place] [address]  05/18/2020    EGD DILATION BALLOON performed by Tammy Gonzalez MD at [address] [place] [address] N/A 07/06/2020    ** CASE IN O.R. WITH GI STAFF** EGD ESOPHAGOGASTRODUODENOSCOPY performed by Leilani Ventura MD at [address] [place] [address] 11/09/2020    EGD DIAGNOSTIC ONLY performed by Amari Huitron MD at [address] [place] [address]  02/08/2021    - EGD WITH REMOVAL PEG TUBE,ERCP STENT REMOVAL,ERCP STONE REMOVAL       Medications:      meropenem  1,000 mg IntraVENous Q12H    sodium chloride flush  5-40 mL IntraVENous 2 times per day    enoxaparin  30 mg SubCUTAneous Daily    busPIRone  20 mg Oral TID    amitriptyline  25 mg Oral Nightly    budesonide-formoterol  2 puff Inhalation BID    metoclopramide  5 mg Oral TID AC    polyethylene glycol  17 g Oral Every Other Day    QUEtiapine  25 mg Oral BID    atorvastatin  40 mg Oral Daily    sucralfate  1 g Oral 4x Daily    traZODone  100 mg Oral Nightly    tamsulosin  0.4 mg Oral Daily    fentanNYL  25 mcg IntraVENous Once       Social History:     Social History     Socioeconomic History    Marital status:      Spouse name: Not on file    Number of children: 2    Years of education: Not on file    Highest education level: Not on file   Occupational History    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   Tobacco Use    Smoking status: Former Smoker     Packs/day: 1.00     Years: 50.00     Pack years: 50.00     Types: Cigarettes    Smokeless tobacco: Never Used    Tobacco comment: quit 1 year ago    Vaping Use    Vaping Use: Former    Substances: Always   Substance and Sexual Activity    Alcohol use: No    Drug use: No    Sexual activity: Never   Other Topics Concern    Not on file   Social History Narrative    Not on file     Social Determinants of Health     Financial Resource Strain:     Difficulty of Paying Living Expenses: Not on file   Food Insecurity:     Worried About [address] in the Last Year: Not on file    [address] in the Last Year: Not on file   Transportation Needs:     Lack of Transportation (Medical): Not on file    Lack of Transportation (Non-Medical):  Not on file   Physical Activity:     Days of Exercise per Week: Not on file    Minutes of Exercise per Session: Not on file   Stress:     Feeling of Stress : Not on file   Social Connections:     Frequency of Communication with Friends and Family: Not on file    Frequency of Social Gatherings with Friends and Family: Not on file    Attends Advent Services: Not on file    Active Member of Clubs or Organizations: Not on file    Attends Club or Organization Meetings: Not on file    Marital Status: Not on file   Intimate Partner Violence:     Fear of Current or Ex-Partner: Not on file    Emotionally Abused: Not on file    Physically Abused: Not on file    Sexually Abused: Not on file   Housing Stability:     Unable to Pay for Housing in the Last Year: Not on file    Number of Places Lived in the Last Year: Not on file    Unstable Housing in the Last Year: Not on file       Family History:     Family History   Problem Relation Age of Onset    Cancer Mother         uterine    Heart Attack Mother     Heart Attack Father     Other Maternal Grandfather         foot gangrene    Other Paternal Grandmother         bleeding ulcers    Other Paternal Grandfather         lung cancer        Allergies:   Prednisone, Compazine [prochlorperazine maleate], Penicillin v potassium, and Penicillins     Review of Systems:   Constitutional: No fevers or chills. No systemic complaints  Head: No headaches  Eyes: No double vision or blurry vision. No conjunctival inflammation. ENT: No sore throat or runny nose. . No hearing loss, tinnitus or vertigo. Cardiovascular: No chest pain or palpitations. No shortness of breath. No HOLLAND  Lung: No shortness of breath or cough. No sputum production  Abdomen: No nausea, vomiting, diarrhea, or abdominal pain. Hayley Stands No cramps. Genitourinary: No increased urinary frequency, or dysuria. No hematuria. No suprapubic or CVA pain  Musculoskeletal: No muscle aches or pains. No joint effusions, swelling or deformities  Hematologic: No bleeding or bruising. Neurologic: No headache, weakness, numbness, or tingling. Integument: No rash, no ulcers. Psychiatric: No depression. Endocrine: No polyuria, no polydipsia, no polyphagia.     Physical Examination :     Patient Vitals for the past 8 hrs:   BP Temp Temp src Resp   11/25/21 1239 116/63 98.7 °F (37.1 °C) Oral --   11/25/21 0723 (!) 105/56 98.5 °F (36.9 °C) Oral 19     General Appearance: Awake, alert, and in no apparent distress  Head:  Normocephalic, no trauma  Eyes: Pupils equal, round, reactive to light and accommodation; extraocular movements intact; sclera anicteric; conjunctivae pink. No embolic phenomena. ENT: Oropharynx clear, without erythema, exudate, or thrush. No tenderness of sinuses. Mouth/throat: mucosa pink and moist. No lesions. Dentition in good repair. Neck:Supple, without lymphadenopathy. Thyroid normal, No bruits. Pulmonary/Chest: Clear to auscultation, without wheezes, rales, or rhonchi. No dullness to percussion. No egophony. Cardiovascular: Regular rate and rhythm without murmurs, rubs, or gallops. Abdomen: Soft, non tender. Bowel sounds normal. No organomegaly  All four Extremities: No cyanosis, clubbing, edema, or effusions. Neurologic: No gross sensory or motor deficits. Skin: Warm and dry with good turgor. No signs of peripheral arterial or venous insufficiency. No ulcerations. No open wounds. Medical Decision Making -Laboratory:   I have independently reviewed/ordered the following labs:    CBC with Differential:   Recent Labs     11/23/21 2043 11/24/21  0631   WBC 8.9 7.9   HGB 13.4 12.3   HCT 42.2 38.1    155   LYMPHOPCT 8*  --    MONOPCT 9  --      BMP:   Recent Labs     11/23/21 2043 11/23/21  2101 11/24/21  0631   *  --  136   K 4.2  --  3.9   CL 98  --  102   CO2 20  --  24   BUN 17  --  13   CREATININE 1.18* 1.44* 1.21*     Hepatic Function Panel:   Recent Labs     11/24/21  0631   PROT 6.1*   LABALBU 3.4*   BILITOT 0.39   ALKPHOS 118*   ALT 15   AST 15     No results for input(s): RPR in the last 72 hours. No results for input(s): HIV in the last 72 hours. No results for input(s): BC in the last 72 hours. Lab Results   Component Value Date    MUCUS NOT REPORTED 11/23/2021    RBC 4.30 11/24/2021    TRICHOMONAS NOT REPORTED 11/23/2021    WBC 7.9 11/24/2021    YEAST NOT REPORTED 11/23/2021    TURBIDITY Clear 11/23/2021     Lab Results   Component Value Date    CREATININE 1.21 11/24/2021    GLUCOSE 103 11/24/2021       Medical Decision Making-Imaging:       Medical Decision Making-Other:        Thank you for allowing us to participate in the care of this patient. Please call with questions.     Vicente Murray MD         Pager: [phone] - Office: [phone]

## 2021-11-26 ENCOUNTER — APPOINTMENT (OUTPATIENT)
Dept: GENERAL RADIOLOGY | Age: 76
DRG: 177 | End: 2021-11-26
Payer: MEDICARE

## 2021-11-26 LAB
ALBUMIN SERPL-MCNC: 3.3 G/DL (ref 3.5–5.2)
ALBUMIN/GLOBULIN RATIO: 1.2 (ref 1–2.5)
ALP BLD-CCNC: 112 U/L (ref 35–104)
ALT SERPL-CCNC: 16 U/L (ref 5–33)
ANION GAP SERPL CALCULATED.3IONS-SCNC: 10 MMOL/L (ref 9–17)
AST SERPL-CCNC: 18 U/L
BILIRUB SERPL-MCNC: 0.28 MG/DL (ref 0.3–1.2)
BUN BLDV-MCNC: 6 MG/DL (ref 8–23)
BUN/CREAT BLD: ABNORMAL (ref 9–20)
CALCIUM SERPL-MCNC: 9 MG/DL (ref 8.6–10.4)
CHLORIDE BLD-SCNC: 100 MMOL/L (ref 98–107)
CO2: 23 MMOL/L (ref 20–31)
CREAT SERPL-MCNC: 0.73 MG/DL (ref 0.5–0.9)
GFR AFRICAN AMERICAN: >60 ML/MIN
GFR NON-AFRICAN AMERICAN: >60 ML/MIN
GFR SERPL CREATININE-BSD FRML MDRD: ABNORMAL ML/MIN/{1.73_M2}
GFR SERPL CREATININE-BSD FRML MDRD: ABNORMAL ML/MIN/{1.73_M2}
GLUCOSE BLD-MCNC: 98 MG/DL (ref 70–99)
MYOGLOBIN: 44 NG/ML (ref 25–58)
POTASSIUM SERPL-SCNC: 3.6 MMOL/L (ref 3.7–5.3)
SODIUM BLD-SCNC: 133 MMOL/L (ref 135–144)
TOTAL PROTEIN: 6.1 G/DL (ref 6.4–8.3)
TROPONIN INTERP: ABNORMAL
TROPONIN T: ABNORMAL NG/ML
TROPONIN, HIGH SENSITIVITY: 28 NG/L (ref 0–14)

## 2021-11-26 PROCEDURE — 2060000000 HC ICU INTERMEDIATE R&B

## 2021-11-26 PROCEDURE — 6370000000 HC RX 637 (ALT 250 FOR IP): Performed by: STUDENT IN AN ORGANIZED HEALTH CARE EDUCATION/TRAINING PROGRAM

## 2021-11-26 PROCEDURE — 6360000002 HC RX W HCPCS: Performed by: NURSE PRACTITIONER

## 2021-11-26 PROCEDURE — 6370000000 HC RX 637 (ALT 250 FOR IP): Performed by: NURSE PRACTITIONER

## 2021-11-26 PROCEDURE — 83874 ASSAY OF MYOGLOBIN: CPT

## 2021-11-26 PROCEDURE — 6360000002 HC RX W HCPCS: Performed by: INTERNAL MEDICINE

## 2021-11-26 PROCEDURE — 99232 SBSQ HOSP IP/OBS MODERATE 35: CPT | Performed by: PSYCHIATRY & NEUROLOGY

## 2021-11-26 PROCEDURE — 74230 X-RAY XM SWLNG FUNCJ C+: CPT

## 2021-11-26 PROCEDURE — 99232 SBSQ HOSP IP/OBS MODERATE 35: CPT | Performed by: INTERNAL MEDICINE

## 2021-11-26 PROCEDURE — 84484 ASSAY OF TROPONIN QUANT: CPT

## 2021-11-26 PROCEDURE — 92611 MOTION FLUOROSCOPY/SWALLOW: CPT

## 2021-11-26 PROCEDURE — 6370000000 HC RX 637 (ALT 250 FOR IP): Performed by: INTERNAL MEDICINE

## 2021-11-26 PROCEDURE — 2580000003 HC RX 258: Performed by: NURSE PRACTITIONER

## 2021-11-26 PROCEDURE — 36415 COLL VENOUS BLD VENIPUNCTURE: CPT

## 2021-11-26 PROCEDURE — 93005 ELECTROCARDIOGRAM TRACING: CPT | Performed by: NURSE PRACTITIONER

## 2021-11-26 PROCEDURE — 93005 ELECTROCARDIOGRAM TRACING: CPT | Performed by: STUDENT IN AN ORGANIZED HEALTH CARE EDUCATION/TRAINING PROGRAM

## 2021-11-26 PROCEDURE — 99232 SBSQ HOSP IP/OBS MODERATE 35: CPT | Performed by: STUDENT IN AN ORGANIZED HEALTH CARE EDUCATION/TRAINING PROGRAM

## 2021-11-26 PROCEDURE — 80053 COMPREHEN METABOLIC PANEL: CPT

## 2021-11-26 PROCEDURE — 94640 AIRWAY INHALATION TREATMENT: CPT

## 2021-11-26 RX ORDER — PANTOPRAZOLE SODIUM 40 MG/1
40 TABLET, DELAYED RELEASE ORAL
Status: DISCONTINUED | OUTPATIENT
Start: 2021-11-26 | End: 2021-11-26

## 2021-11-26 RX ORDER — OXYBUTYNIN CHLORIDE 5 MG/1
5 TABLET ORAL 2 TIMES DAILY PRN
Status: DISCONTINUED | OUTPATIENT
Start: 2021-11-26 | End: 2021-11-30 | Stop reason: HOSPADM

## 2021-11-26 RX ORDER — PANTOPRAZOLE SODIUM 40 MG/1
40 TABLET, DELAYED RELEASE ORAL
Status: DISCONTINUED | OUTPATIENT
Start: 2021-11-26 | End: 2021-11-30 | Stop reason: HOSPADM

## 2021-11-26 RX ORDER — CALCIUM CARBONATE 200(500)MG
1000 TABLET,CHEWABLE ORAL 3 TIMES DAILY PRN
Status: DISCONTINUED | OUTPATIENT
Start: 2021-11-26 | End: 2021-11-30 | Stop reason: HOSPADM

## 2021-11-26 RX ADMIN — MORPHINE SULFATE 2 MG: 4 INJECTION, SOLUTION INTRAMUSCULAR; INTRAVENOUS at 22:58

## 2021-11-26 RX ADMIN — ATORVASTATIN CALCIUM 40 MG: 80 TABLET, FILM COATED ORAL at 09:01

## 2021-11-26 RX ADMIN — PANTOPRAZOLE SODIUM 40 MG: 40 TABLET, DELAYED RELEASE ORAL at 06:02

## 2021-11-26 RX ADMIN — MEROPENEM 1000 MG: 1 INJECTION, POWDER, FOR SOLUTION INTRAVENOUS at 11:24

## 2021-11-26 RX ADMIN — QUETIAPINE FUMARATE 25 MG: 25 TABLET ORAL at 09:01

## 2021-11-26 RX ADMIN — ONDANSETRON 4 MG: 2 INJECTION INTRAMUSCULAR; INTRAVENOUS at 19:43

## 2021-11-26 RX ADMIN — ANTACID TABLETS 500 MG: 500 TABLET, CHEWABLE ORAL at 11:30

## 2021-11-26 RX ADMIN — SODIUM CHLORIDE, PRESERVATIVE FREE 10 ML: 5 INJECTION INTRAVENOUS at 21:05

## 2021-11-26 RX ADMIN — METOCLOPRAMIDE 5 MG: 5 TABLET ORAL at 11:24

## 2021-11-26 RX ADMIN — SUCRALFATE 1 G: 1 TABLET ORAL at 16:00

## 2021-11-26 RX ADMIN — SODIUM CHLORIDE, PRESERVATIVE FREE 10 ML: 5 INJECTION INTRAVENOUS at 09:02

## 2021-11-26 RX ADMIN — SODIUM CHLORIDE, PRESERVATIVE FREE 10 ML: 5 INJECTION INTRAVENOUS at 09:15

## 2021-11-26 RX ADMIN — POLYETHYLENE GLYCOL 3350 17 G: 17 POWDER, FOR SOLUTION ORAL at 11:18

## 2021-11-26 RX ADMIN — BUSPIRONE HYDROCHLORIDE 20 MG: 10 TABLET ORAL at 14:38

## 2021-11-26 RX ADMIN — PANTOPRAZOLE SODIUM 40 MG: 40 TABLET, DELAYED RELEASE ORAL at 16:01

## 2021-11-26 RX ADMIN — TRAZODONE HYDROCHLORIDE 100 MG: 100 TABLET ORAL at 21:04

## 2021-11-26 RX ADMIN — ONDANSETRON 4 MG: 2 INJECTION INTRAMUSCULAR; INTRAVENOUS at 14:38

## 2021-11-26 RX ADMIN — MORPHINE SULFATE 2 MG: 4 INJECTION, SOLUTION INTRAMUSCULAR; INTRAVENOUS at 09:02

## 2021-11-26 RX ADMIN — ANTACID TABLETS 500 MG: 500 TABLET, CHEWABLE ORAL at 02:33

## 2021-11-26 RX ADMIN — SUCRALFATE 1 G: 1 TABLET ORAL at 11:24

## 2021-11-26 RX ADMIN — BUDESONIDE AND FORMOTEROL FUMARATE DIHYDRATE 2 PUFF: 160; 4.5 AEROSOL RESPIRATORY (INHALATION) at 08:07

## 2021-11-26 RX ADMIN — METOCLOPRAMIDE 5 MG: 5 TABLET ORAL at 16:01

## 2021-11-26 RX ADMIN — MORPHINE SULFATE 2 MG: 4 INJECTION, SOLUTION INTRAMUSCULAR; INTRAVENOUS at 13:53

## 2021-11-26 RX ADMIN — ONDANSETRON 4 MG: 2 INJECTION INTRAMUSCULAR; INTRAVENOUS at 06:02

## 2021-11-26 RX ADMIN — SUCRALFATE 1 G: 1 TABLET ORAL at 09:01

## 2021-11-26 RX ADMIN — BUSPIRONE HYDROCHLORIDE 20 MG: 10 TABLET ORAL at 21:02

## 2021-11-26 RX ADMIN — QUETIAPINE FUMARATE 25 MG: 25 TABLET ORAL at 21:02

## 2021-11-26 RX ADMIN — TAMSULOSIN HYDROCHLORIDE 0.4 MG: 0.4 CAPSULE ORAL at 09:01

## 2021-11-26 RX ADMIN — METOCLOPRAMIDE 5 MG: 5 TABLET ORAL at 09:01

## 2021-11-26 RX ADMIN — SUCRALFATE 1 G: 1 TABLET ORAL at 21:04

## 2021-11-26 RX ADMIN — ENOXAPARIN SODIUM 30 MG: 100 INJECTION SUBCUTANEOUS at 09:01

## 2021-11-26 RX ADMIN — AMITRIPTYLINE HYDROCHLORIDE 25 MG: 25 TABLET, FILM COATED ORAL at 21:04

## 2021-11-26 RX ADMIN — BUSPIRONE HYDROCHLORIDE 20 MG: 10 TABLET ORAL at 09:00

## 2021-11-26 ASSESSMENT — PAIN SCALES - GENERAL
PAINLEVEL_OUTOF10: 9
PAINLEVEL_OUTOF10: 7
PAINLEVEL_OUTOF10: 0
PAINLEVEL_OUTOF10: 0
PAINLEVEL_OUTOF10: 10
PAINLEVEL_OUTOF10: 0

## 2021-11-26 ASSESSMENT — ENCOUNTER SYMPTOMS
EYE PAIN: 0
FACIAL SWELLING: 0
SINUS PAIN: 0
COLOR CHANGE: 0
CHOKING: 0
DIARRHEA: 0
VOICE CHANGE: 0
PHOTOPHOBIA: 0
EYE REDNESS: 0
WHEEZING: 0
SHORTNESS OF BREATH: 0
VOMITING: 0
COUGH: 0
SINUS PRESSURE: 0
EYE DISCHARGE: 0
EYE ITCHING: 0
CONSTIPATION: 0

## 2021-11-26 NOTE — PROGRESS NOTES
[facility]  Office: [address], DO, Janae Angel, DO, Kareem Light, DO, Jassi Kylie Blood, DO, Daniella Vargas MD, Ari Ortiz MD, Duke Ruvalcaba MD, Ana Snyder MD, Keke Barcenas MD, Susan Aguilar MD, Samreen Gustafson MD, Allison Roach, DO, Fern Foss, DO, Jc Guevara MD,  Snehal Mitchell DO, Katrin Morrison MD, Milo Hernández MD, Juan C Malcolm MD, Hai Ellis MD, Kary Ulloa MD, Ketty Guerrier MD, Yelena Boles MD, Liset Jon [facility], [facility], CNP, Phylicia Crowder, CNP, Ashkan , CNS, Cayla Staley, CNP, Sapna Foster, CNP, Michelle Alston, CNP, Piotr Canela, CNP, Bee Burgess, CNP, SERGO JiménezC, Stacy Lee, [facility] ([place], [place]), Kaela Membreno, CNP, Devyn Srinivasan, CNP, Aldo Stiles, CNP, Shavon Lord, CNP, Gillian Farris, CNP, Rakesh Aradna, CNP, Felicity Diaz, [address] [place] [address]    Progress Note    11/26/2021    3:05 PM    Name:   Yasmin Cunningham  MRN:     [id number]     Acct:      [de-identified]   Room:   [address] [place] [address] Day:  3  Admit Date:  11/23/2021  8:12 PM    PCP:   Lupe Betancourt MD  Code Status:  Full Code    Subjective:     C/C:   Chief Complaint   Patient presents with    Abdominal Pain    Altered Mental Status     Interval History Status: improved. Patient seen examined bedside. A little anxious,. Not tolerating diet as well as yestrday    Brief History:   59-year-old female past medical history of anxiety, depression, prior history of Nissen fundoplication presented with fevers chills nausea vomiting abdominal pain as well as difficulty speaking continued abdominal pain. GI and general surgery were consulted as well as infectious disease. Patient also being followed by neurology due to concern of altered mental status. Patient underwent EGD and biopsy on 11/24/2021 showing:  Findings:   1. The esophagus was tortuous with dilated distal third.   2. The GE junction was noted at 38 cm.  3. The GE junction compliant to the passage of scope without evidence of stricture or stenosis. 4. Mild reflux esophagitis was noted at the GE junction. 5. Sarah-Abrams tear. 6. The mucosa in the fundus, cardia and proximal gastric body appeared severely congested, inflamed with diffuse erythema, and granularity. Biopsies were performed for histology. 7. The distal gastric body antrum and pylorus appeared normal.  8. The examined duodenum appeared normal.     Recommendations:  1. Await pathology results. 2. Continue IV pantoprazole 40 mg twice daily for 2 days then change to oral once daily thereafter. 3. Start full liquid diet and advance as tolerated. 4. Patient's nausea vomiting most likely due to infectious (viral) vs medication gastritis. This will resolve with supportive care and antiemetics. 5. Gastric distention noted on CT is most likely due to intractable nausea vomiting. Due to recurrent nausea vomiting. 6. Continue supportive care. Once patient is able to tolerate diet she can be discharged home. 7. Patient to follow-up with primary GI: Dr Lam Mode    MRI 11/24/2021:  Impression   1. No acute intracranial abnormality, specifically no evidence of acute   infarct. 2. Minimal microvascular ischemic disease. Review of Systems:     Constitutional:  negative for chills, fevers, sweats  Respiratory:  negative for cough, dyspnea on exertion, shortness of breath, wheezing  Cardiovascular:  negative for chest pain, chest pressure/discomfort, lower extremity edema, palpitations  Gastrointestinal:  negative for abdominal pain, constipation, diarrhea, nausea, vomiting  Neurological:  negative for dizziness, headache    Medications: Allergies:     Allergies   Allergen Reactions    Prednisone Anxiety    Compazine [Prochlorperazine Maleate]     Penicillin V Potassium     Penicillins Other (See Comments)     Shock- received meropenem and ceftriaxone wo issues 2020       Current Meds: Scheduled Meds:    pantoprazole  40 mg Oral BID AC    meropenem  1,000 mg IntraVENous Q12H    sodium chloride flush  5-40 mL IntraVENous 2 times per day    enoxaparin  30 mg SubCUTAneous Daily    busPIRone  20 mg Oral TID    amitriptyline  25 mg Oral Nightly    budesonide-formoterol  2 puff Inhalation BID    metoclopramide  5 mg Oral TID AC    polyethylene glycol  17 g Oral Every Other Day    QUEtiapine  25 mg Oral BID    atorvastatin  40 mg Oral Daily    sucralfate  1 g Oral 4x Daily    traZODone  100 mg Oral Nightly    tamsulosin  0.4 mg Oral Daily     Continuous Infusions:    sodium chloride      sodium chloride 100 mL/hr at 11/25/21 0842     PRN Meds: oxybutynin, ondansetron, sodium chloride flush, sodium chloride, acetaminophen **OR** acetaminophen, LORazepam, calcium carbonate, magnesium hydroxide, morphine    Data:     Past Medical History:   has a past medical history of Anxiety, Arthritis, Back pain, Bronchitis, Caffeine use, Carpal tunnel syndrome, Cataracts, bilateral, Constipation, Depression, Diarrhea, GERD (gastroesophageal reflux disease), H/O gastric ulcer, Hyperlipidemia, Hypertension, Mumps, MVP (mitral valve prolapse), Recurrent UTI, Sinusitis, Tracheostomy in place [facility]), Ulcerative esophagitis, and Wellness examination. Social History:   reports that she has quit smoking. Her smoking use included cigarettes. She has a 50.00 pack-year smoking history. She has never used smokeless tobacco. She reports that she does not drink alcohol and does not use drugs. Family History:   Family History   Problem Relation Age of Onset   Theodoro Milder Cancer Mother         uterine    Heart Attack Mother     Heart Attack Father     Other Maternal Grandfather         foot gangrene    Other Paternal Grandmother         bleeding ulcers    Other Paternal Grandfather         lung cancer       Vitals:  /68   Pulse 80   Temp 101.2 °F (38.4 °C) (Temporal) Comment: Simultaneous filing.  User may not have seen previous data. Comment (Src): Simultaneous filing. User may not have seen previous data. Resp 20   Ht 5' 2\" (1.575 m)   Wt 184 lb 8.4 oz (83.7 kg)   SpO2 97%   BMI 33.75 kg/m²   Temp (24hrs), Av.2 °F (37.3 °C), Min:97 °F (36.1 °C), Max:101.2 °F (38.4 °C)    Recent Labs     21   POCGLU 126*       I/O (24Hr): Intake/Output Summary (Last 24 hours) at 2021 1505  Last data filed at 2021 0552  Gross per 24 hour   Intake 2411.93 ml   Output 1775 ml   Net 636.93 ml       Labs:  Hematology:  Recent Labs     21  0631   WBC 8.9 7.9   RBC 4.82 4.30   HGB 13.4 12.3   HCT 42.2 38.1   MCV 87.6 88.6   MCH 27.8 28.6   MCHC 31.8 32.3   RDW 16.4* 16.8*    155   MPV 11.2 10.8   INR 1.0 1.1     Chemistry:  Recent Labs     21  0631   *  --  136   K 4.2  --  3.9   CL 98  --  102   CO2 20  --  24   GLUCOSE 132*  --  103*   BUN 17  --  13   CREATININE 1.18* 1.44* 1.21*   ANIONGAP 14  --  10   LABGLOM 45* 35* 43*   GFRAA 54*  --  52*   CALCIUM 9.0  --  8.8   TROPHS 29*  --  57*   CKTOTAL 38  --   --    CKMB 1.4  --   --    MYOGLOBIN 52  --   --      Recent Labs     21  0631   PROT  --  6.1*   LABALBU  --  3.4*   AST  --  15   ALT  --  15   ALKPHOS  --  118*   BILITOT  --  0.39   POCGLU 126*  --      ABG:  Lab Results   Component Value Date    POCPH 7.437 2020    POCPCO2 42.3 2020    POCPO2 129.9 2020    POCHCO3 28.6 2020    NBEA NOT REPORTED 2020    PBEA 4 2020    HPY1CHK 30 2020    UJTF6HTE 99 2020    FIO2 NOT REPORTED 2021     Lab Results   Component Value Date/Time    SPECIAL NOT REPORTED 2021 10:12 PM     Lab Results   Component Value Date/Time    CULTURE NO GROWTH 2021 10:12 PM       Radiology:  CT Head WO Contrast    Result Date: 2021  No CT evidence of an acute infarct.  The findings were sent to the Radiology Results Communication Center at 8:54 pm on 11/23/2021to be communicated to a licensed caregiver. CT CHEST WO CONTRAST    Result Date: 11/24/2021  1. Basilar opacities, right greater than left, with central airway congestion and filling of the peripheral subsegmental bronchi in the right lower lobe. Overall these features are suggestive of chronic/recurrent aspiration. 2.  Hiatal hernia with diffuse mild esophageal dilatation with fluid, which may represent reflux or stasis. 3.  Emphysema. CT ABDOMEN PELVIS W IV CONTRAST Additional Contrast? None    Result Date: 11/23/2021  Moderate hiatal hernia with a fluid-filled patulous distal esophagus which is similar to prior comparison exams. Distended stomach with a normal caliber duodenum. Query possible gastroparesis or functional outlet obstruction. Possible findings of proctitis. Correlate clinically. Hepatomegaly with steatosis, unchanged. Findings in the dependent lung bases which are suspicious for sequelae of aspiration pneumonia in the appropriate clinical setting. XR CHEST PORTABLE    Result Date: 11/23/2021  Chronic lung changes with right basilar airspace opacity concerning for underlying pneumonia in the appropriate clinical setting. Correlate with aspiration risk. CTA HEAD NECK W CONTRAST    Result Date: 11/23/2021  No large vessel occlusion visualized within the head or neck. Incidentally noted pulmonary emphysema and dilated, air and fluid-filled esophagus. MRI BRAIN WO CONTRAST    Result Date: 11/24/2021  1. No acute intracranial abnormality, specifically no evidence of acute infarct. 2. Minimal microvascular ischemic disease.        Physical Examination:        General appearance:  alert, cooperative and no distress  Mental Status:  oriented to person, place and time and normal affect  Lungs:  clear to auscultation bilaterally, normal effort  Heart:  regular rate and rhythm, no murmur  Abdomen:  soft, nontender, nondistended, normal bowel sounds  Extremities:  no edema, redness, tenderness in the calves  Skin:  no gross lesions, rashes, induration    Assessment:        Hospital Problems           Last Modified POA    * (Principal) Expressive aphasia 11/24/2021 Yes    Hiatal hernia with GERD and esophagitis 11/25/2021 Yes    Depression 11/24/2021 Yes    Fever 11/24/2021 Yes    Gastric outlet obstruction 11/24/2021 Yes    Hypertension 11/24/2021 Yes    S/P Nissen fundoplication (without gastrostomy tube) procedure 11/24/2021 Yes          Plan:        1. Expressive aphasia. Appreciate neurology recommendations. MRI showing no acute intracranial abnormalities, expressive aphasia has resolved. Follow-up with neurology as an outpatient. Swallow study unremarkable  2. Esophagitis, Sarah-Abrams tear, gastritis status post EGD 11/24/2021. Appreciate GI recommendations. IV Protonix. Be changed to p.o. daily afterwards. Salazar eback to full liquid diet  3. Currently on meropenem. Shavon Skinner Appreciate infectious disease recommendations. No growth 2 days from blood cultures, growth on urine  4. Depression anxiety. Continue home medications of BuSpar, Seroquel, trazodone  5. Protonix BID, adjust carafate to slurry, did discuss with GI  6. Encourage working with PT and OT  7. Discharge planning.     Pedro Espinal MD  11/26/2021  3:05 PM

## 2021-11-26 NOTE — PROGRESS NOTES
[facility])  Occupational Therapy Not Seen Note    DATE: 2021    NAME: Vick Barreto  MRN: [id number]   : 1945      Patient not seen this date for Occupational Therapy due to:    Testing: off unit at radiology, multiple tests this am    Next Scheduled Treatment: check back 2021    Electronically signed by KYRA Nix on 2021 at 10:11 AM

## 2021-11-26 NOTE — PROCEDURES
INSTRUMENTAL SWALLOW REPORT  MODIFIED BARIUM SWALLOW    NAME: Yazmin Castro   : 1945  MRN: [id number]       Date of Eval: 2021              Referring Diagnosis(es):      Past Medical History:  has a past medical history of Anxiety, Arthritis, Back pain, Bronchitis, Caffeine use, Carpal tunnel syndrome, Cataracts, bilateral, Constipation, Depression, Diarrhea, GERD (gastroesophageal reflux disease), H/O gastric ulcer, Hyperlipidemia, Hypertension, Mumps, MVP (mitral valve prolapse), Recurrent UTI, Sinusitis, Tracheostomy in place [facility]), Ulcerative esophagitis, and Wellness examination. Past Surgical History:  has a past surgical history that includes Hysterectomy; total nephrectomy; Tonsillectomy; Appendectomy; Cystoscopy; Ovary removal; Tubal ligation; rhinoplasty; Carpal tunnel release; Hand surgery; Total hip arthroplasty; eye surgery; Colonoscopy; joint replacement (Right); Gastric fundoplication (N/A, ); hc cath power picc triple (2020); Upper gastrointestinal endoscopy (N/A, 2020); tracheostomy (N/A, 2020); Gastrostomy tube placement (N/A, 2020); tracheostomy (N/A, 2020); Upper gastrointestinal endoscopy (N/A, 2020); Upper gastrointestinal endoscopy (2020); ERCP (2020); ERCP (2020); ERCP (2020); ERCP (2020); Cholecystectomy, laparoscopic (N/A, 2020); Upper gastrointestinal endoscopy (N/A, 2020); Upper gastrointestinal endoscopy (N/A, 2020); Upper gastrointestinal endoscopy (2021); Gastrostomy tube placement (N/A, 2021); ERCP (N/A, 2021); and ERCP (2021).     Current Diet Solid Consistency: NPO  Current Diet Liquid Consistency: NPO       Type of Study: Initial MBS     Patient Complaints/Reason for Referral:  Yazmin Castro was referred for a MBS to assess the efficiency of his/her swallow function, assess for aspiration, and to make recommendations regarding safe dietary consistencies, effective compensatory strategies, and safe eating environment. Onset of problem:      Alvarez Bass is a 68 y.o. Non- / non  female who presents with Abdominal Pain and Altered Mental Status   and is admitted to the hospital for the management of Expressive aphasia.     Pt is a 69 yo  female with a PMH of anxiety/depression, s/p Deshaun fundoplication, frequent constipation, who presented with a fever, T 103, and 3 weeks of nausea/vomiting and abdominal pain. Patient reports feeling weak and having an episode of difficulty speaking last night. She complained of bilateral lower abdominal pain, and constipation x 5 days. She had a suppository last night at her extended care facility which relieved her constipation. However, her abdominal pain did not improve. She was able to urinate last night, but was not able to urinate in the emergency department. She underwent straight cath and ultimately a Saldana catheter was placed. She is very tearful and anxious because she has not received her anxiety medications last night or today. Her speech is back to baseline. She denies any headache or weakness in her upper or lower extremities.     Patient lives in an extended care facility and is in a wheelchair. She is able to stand at times and pivot. Behavior/Cognition/Vision/Hearing:  Behavior/Cognition: Alert; Cooperative  Vision: Within Functional Limits  Hearing: Within functional limits    Impressions:  Patient presents with  safe swallow for Regular diet with thin liquids as evidenced by no observed aspiration noted with consistencies tested. Recommend small sips and bites, only feed when alert and awake and upright at 90 degrees for all PO intake. Recommend close monitoring for overt/clinical s/s of aspiration and D/C PO intake and complete Modified Barium Swallow Study should they occur. Results and recommendations reported to RN.        Patient Position: Lateral and Patient Degrees: 90      Consistencies Administered: Dysphagia Soft and Bite-Sized (Dysphagia III); Reg solid; Dysphagia Pureed (Dysphagia I); Thin cup    Compensatory Swallowing Strategies Attempted: Alternate solids and liquids; Eat/Feed slowly; Upright as possible for all oral intake; Small bites/sips  Postural Changes and/or Swallow Maneuvers Trialed: Upright 90 degrees        Recommended Diet:  Solid consistency: Regular  Liquid consistency: Thin  Liquid administration via: Cup    Medication administration: PO    Safe Swallow Protocol:  Supervision: Independent  Compensatory Swallowing Strategies: Alternate solids and liquids; Eat/Feed slowly; Upright as possible for all oral intake; Small bites/sips      Recommendations/Treatment  Requires SLP Intervention: Yes        D/C Recommendations: To be determined  Postural Changes and/or Swallow Maneuvers: Upright 90 degrees      Recommended Exercises:    Therapeutic Interventions: Diet tolerance monitoring         Education: Images and recommendations were reviewed with pt following this exam.   Patient Education: yes  Patient Education Response: Verbalizes understanding    Prognosis  Prognosis for safe diet advancement: good  Duration/Frequency of Treatment  Duration/Frequency of Treatment: 2-3X      Goals:    Long Term:     To Maximize safety with intake, optimize nutrition/hydration and minimize risk for aspiration. Short Term:  Goals:  The patient will tolerate recommended diet without observed clinical signs of aspiration      Oral Preparation / Oral Phase  Oral Phase: WFL    Oral Phase: A-P transit and oral manipualtion functional for consistencies tested    Pharyngeal Phase  Pharyngeal Phase: WFL    Pharyngeal: Thin: WNL, Puree WNL, Soft Solid: spill to pyriform no penetration no aspiration, Cookie:  WNL    Dysphagia Outcome Severity Scale: Level 7: Normal in all situations  Penetration-Aspiration Scale (PAS): 1 - Material does not enter the airway        Esophageal Phase  Esophageal Screen: WFL        Pain   Patient Currently in Pain: No  Pain Level: 9  Pain Type: Acute pain  Pain Location: Abdomen      Therapy Time:   Individual Concurrent Group Co-treatment   Time In 0930         Time Out 1000         Minutes NASEEM Diaz, 11/26/2021, 11:34 AM

## 2021-11-26 NOTE — PROGRESS NOTES
Physician Progress Note      PATIENTDaritesh Terry  CSN #:                  [id number]  :                       1945  ADMIT DATE:       2021 8:12 PM  [address] DATE:  RESPONDING  PROVIDER #:        Adelia Huang MD          QUERY TEXT:    Pt admitted with aspiration pneumonia. Pt noted to be on O2 5L with 87-94%   sats. If possible, please document in the progress notes and discharge summary   if you are evaluating and/or treating any of the following: The medical record reflects the following:  Risk Factors: Dysphagia, aspiration pneumonia, Naus/vomiting,  GERD with   esophagitis  Clinical Indicators: O2 5L NC sats 87-94%, CT Chest Emphysema, Basilar   opacities R>L with central airway congestion. Former smoker 47 years. Venous   gas PO2 28.0, O2 sat 55, PCO2 38.6, PH 7.428, HCO3 25.5  Treatment: Oxygen 5L NC, Labs, monitor  Options provided:  -- Acute respiratory failure with hypoxia  -- Chronic respiratory failure with hypoxia  -- Acute on chronic respiratory failure with hypoxia  -- Other - I will add my own diagnosis  -- Disagree - Not applicable / Not valid  -- Disagree - Clinically unable to determine / Unknown  -- Refer to Clinical Documentation Reviewer    PROVIDER RESPONSE TEXT:    This patient has chronic respiratory failure with hypoxia. Query created by:  Ethan Menchaca on 2021 11:15 AM      Electronically signed by:  Adelia Huang MD 2021 3:00 PM

## 2021-11-26 NOTE — PROGRESS NOTES
Physical Therapy        Physical Therapy Cancel Note      DATE: 2021    NAME: Nicole Morocho  MRN: [id number]   : 1945      Patient not seen this date for Physical Therapy due to:    Patient Declined: pt very nauseous, \"I feel lousy\"; she stated she doesn't want PT while here in the hospital, but I'm keeping her on the list to check on Monday just in case she isn't dc'd over the weekend like she anticipates; check status 21      Electronically signed by Yen Oneill PT on 2021 at 3:00 PM

## 2021-11-26 NOTE — PLAN OF CARE
Problem: Falls - Risk of:  Goal: Will remain free from falls  Description: Will remain free from falls  11/26/2021 0116 by Opal Duran RN  Outcome: Ongoing  11/25/2021 1656 by Shahid Segal RN  Outcome: Ongoing  Goal: Absence of physical injury  Description: Absence of physical injury  11/26/2021 0116 by Opal Duran RN  Outcome: Ongoing  11/25/2021 1656 by Shahid Segal RN  Outcome: Ongoing     Problem: Skin Integrity:  Goal: Will show no infection signs and symptoms  Description: Will show no infection signs and symptoms  11/26/2021 0116 by Opal Duran RN  Outcome: Ongoing  11/25/2021 1656 by Shahid Segal RN  Outcome: Ongoing  Goal: Absence of new skin breakdown  Description: Absence of new skin breakdown  11/26/2021 0116 by Opal Duran RN  Outcome: Ongoing  11/25/2021 1656 by Shahid Segal RN  Outcome: Ongoing     Problem: Anxiety:  Goal: Level of anxiety will decrease  Description: Level of anxiety will decrease  11/26/2021 0116 by Opal Duran RN  Outcome: Ongoing  11/25/2021 1656 by Shahid Segal RN  Outcome: Ongoing     Problem: Infection:  Goal: Will remain free from infection  Description: Will remain free from infection  11/26/2021 0116 by Opal Duran RN  Outcome: Ongoing  11/25/2021 1656 by Shahid Segal RN  Outcome: Ongoing     Problem: Safety:  Goal: Free from accidental physical injury  Description: Free from accidental physical injury  11/26/2021 0116 by Opal Duran RN  Outcome: Ongoing  11/25/2021 1656 by Shahid Segal RN  Outcome: Ongoing  Goal: Free from intentional harm  Description: Free from intentional harm  11/26/2021 0116 by Opal Duran RN  Outcome: Ongoing  11/25/2021 1656 by Shahid Segal RN  Outcome: Ongoing     Problem: Daily Care:  Goal: Daily care needs are met  Description: Daily care needs are met  11/26/2021 0116 by Opal Duran RN  Outcome: Ongoing  11/25/2021 1656 by Shahid Segal RN  Outcome: Ongoing     Problem: Pain:  Goal: Patient's pain/discomfort is manageable  Description: Patient's pain/discomfort is manageable  11/26/2021 0116 by Allison Baron RN  Outcome: Ongoing  11/25/2021 1656 by Nighat Lawrence RN  Outcome: Ongoing     Problem: Skin Integrity:  Goal: Skin integrity will stabilize  Description: Skin integrity will stabilize  11/26/2021 0116 by Allison Baron RN  Outcome: Ongoing  11/25/2021 1656 by Nighat Lawrence RN  Outcome: Ongoing     Problem: Discharge Planning:  Goal: Patients continuum of care needs are met  Description: Patients continuum of care needs are met  11/26/2021 0116 by Allison Baron RN  Outcome: Ongoing  11/25/2021 1656 by Nighat Lawrence RN  Outcome: Ongoing

## 2021-11-26 NOTE — PROGRESS NOTES
Received call from patient's oldest son this morning and gave him updates regarding her plan of care. Son called back this afternoon regarding pt's diet status. Pt's son asked writer why pt has been NPO all day. Writer explained that pt's doctor was contacted early this morning for direction on pt's diet and writer awaited orders. Writer explained to son that juice had been given to pt in anticipation for diet orders and that she had not indeed been NPO all day. Full liquid diet order was put in shortly before son's call, and milk was offered to pt which she did drink. Son asked writer why pt was now on liquid diet instead of solid food. Writer stated it could be assumed that the doctor wants to advance the diet slowly to make sure it is tolerated before introducing solid food. Son stated that he did not want assumptions. Writer offered to put son in touch with the doctor so that information could be shared about the doctor's plans for pt's care. Son became harassing to writer, speaking loudly and interrupting writer's responses. Son stated, \"So you can't give her food until there are orders to give her food? \" Writer confirmed that this is correct. Son then yelled at Charlie Eugene the hell is wrong with this place? \" At this point writer hung up without further response. It became evident to writer that pt was contacting son about apparent distress that pt failed to share with writer. Writer was in pt's room many times throughout the shift and pt did not express any upset regarding her diet, and in fact complained of heartburn and vomited several times throughout the shift. Pt used call light many times to contact writer and was always answered, but never expressed any distress about her diet. Pt was asked by writer to please tell Mary Kay Pino first if something is upsetting her so that it can be fixed before people get upset.  Pt agreed that she hadn't realized she had created an adversarial situation and apologized. Pt stated that Melquiades Walsh has given her very good care today and was apologetic for her son's behavior. Son has expressed to nursing supervisor, however, that he is dissatisfied with her nursing care. When pt's full liquid tray arrived, pt stated she did not have an appetite because of the milk she drank earlier. Pt is in no distress at this time.

## 2021-11-26 NOTE — PROGRESS NOTES
NEUROLOGY INPATIENT PROGRESS NOTE      11/26/2021     Briefly, Patrizia Vargas is a  68 y.o. female admitted on 11/23/2021 with speech difficulty and abdominal pain and was admitted to hospital for the management of small bowel obstruction. Neurology were consulted for the transient episode of aphasia. She described that since the day prior to admission she was having difficulty speaking without difficulty thinking or comprehending. On presentation her symptoms improved and NIH was 0. CT head and CTA of the head and neck were unremarkable for acute infarction or occlusion as well as MRI of the brain. Pertinent labs on presentation: Sodium 1/30/2012, BUN 17, creatinine 1.18, lactic acid 2.3, troponin 29 > 57, glucose 132. Urinalysis was negative for UTI, specific gravity 1.05        Subjective: no acute events overnight. No episode of aphasia since admission       No current facility-administered medications on file prior to encounter. Current Outpatient Medications on File Prior to Encounter   Medication Sig Dispense Refill    bisacodyl (DULCOLAX) 10 MG suppository Place 10 mg rectally daily as needed for Constipation      Sodium Phosphates (FLEET) 7-19 GM/118ML Place 1 enema rectally As needed after no BM for 5 days      clotrimazole-betamethasone (LOTRISONE) 1-0.05 % cream Apply topically 2 times daily Apply topically 2 times daily.       magnesium hydroxide (MILK OF MAGNESIA) 400 MG/5ML suspension Take by mouth daily as needed for Constipation      polyethylene glycol (GLYCOLAX) 17 g packet Take 17 g by mouth every other day      metoclopramide (REGLAN) 5 MG tablet Take 5 mg by mouth 3 times daily (before meals) For nausea      budesonide-formoterol (SYMBICORT) 160-4.5 MCG/ACT AERO Inhale 2 puffs into the lungs 2 times daily      ferrous sulfate (FE TABS 325) 325 (65 Fe) MG EC tablet Take 1 tablet by mouth daily (with breakfast) 90 tablet 3    furosemide (LASIX) 20 MG tablet Take 1 tablet by mouth daily 60 tablet 3    amitriptyline (ELAVIL) 25 MG tablet Take 1 tablet by mouth nightly      busPIRone (BUSPAR) 10 MG tablet Take 2 tablets by mouth 3 times daily      metoprolol succinate (TOPROL XL) 25 MG extended release tablet Take 1 tablet by mouth daily 30 tablet 3    sucralfate (CARAFATE) 1 GM tablet Take 1 tablet by mouth 4 times daily 120 tablet 3    RA VITAMIN D-3 50 MCG (2000 UT) CAPS take 1 capsule by mouth once daily      Oyster Shell 500 MG TABS Take 500 mg by mouth 2 times daily       calcium carbonate (TUMS) 500 MG chewable tablet Take 1 tablet by mouth 3 times daily as needed for Heartburn      simvastatin (ZOCOR) 40 MG tablet Take 40 mg by mouth nightly. Allergies: Florencia Rosas is allergic to prednisone, compazine [prochlorperazine maleate], penicillin v potassium, and penicillins.     Past Medical History:   Diagnosis Date    Anxiety     Arthritis     Back pain     Bronchitis     Caffeine use     8 coffee / day    Carpal tunnel syndrome     Cataracts, bilateral     Constipation     Depression     Diarrhea     GERD (gastroesophageal reflux disease)     H/O gastric ulcer     Hyperlipidemia     Hypertension     Mumps     MVP (mitral valve prolapse)     Dr. Sarah Augustin in May 2019    Recurrent UTI     Dr. Richard Parents    Sinusitis     Tr[facility] in [facility])     Ulcerative esophagitis     Wellness examination     Dr. Lenora Boyce seen in Jan 2020       Past Surgical History:   Procedure Laterality Date    APPENDECTOMY      CARPAL TUNNEL RELEASE      CHOLECYSTECTOMY, LAPAROSCOPIC N/A 05/22/2020    XI LAPAROSCOPIC ROBOTIC CHOLECYSTECTOMY, PYLOROPLASTY performed by Renny Cox MD at [facility] ERCP  05/21/2020    with stent insertion, balloon dilation, sphinctereotomy    ERCP  05/21/2020    ** CASE IN OR [facility] GI STAFF** ERCP STENT INSERTION performed by Kia Zurita MD at [facility] Endoscopy    ERCP  05/21/2020    ** CASE IN OR WITH GI STAFF**ERCP SPHINCTER/PAPILLOTOMY performed by Miracle Clarke MD at \[facility]"" Endoscopy    ERCP  05/21/2020    ** CASE IN OR WITH GI STAFF**ERCP DILATION BALLOON performed by Miracle Clarke MD at \[facility]\"" Endoscopy    ERCP N/A 2/8/2021    ERCP STENT REMOVAL performed by Ania Polo MD at \[facility]\"" Endoscopy    ERCP  2/8/2021    ERCP STONE REMOVAL performed by Ania Polo MD at [address]      patricia IOL    GASTRIC FUNDOPLICATION N/A 21/49/6200    XI LAPAROSCOPIC ROBOTIC HIATAL HERNIA REPAIR, CHERYL FUNDOPLICATION performed by Sadiq Johnson MD at [place] [address] N/A 03/27/2020    EGD PEG TUBE PLACEMENT performed by Sadiq Johnson MD at [place] [address] N/A 2/8/2021    **CASE IN O.R. W/ GI STAFF - EGD WITH REMOVAL PEG TUBE performed by Ania Polo MD at [address], [facility]. CATH POWER PICC TRIPLE  03/04/2020         HYSTERECTOMY      Abdominal    JOINT REPLACEMENT Right     right hip    OVARY REMOVAL      RHINOPLASTY      TONSILLECTOMY      TOTAL HIP ARTHROPLASTY      TOTAL NEPHRECTOMY      atrophic from infections, age 13   [place] TRACHEOSTOMY N/A 03/27/2020    **ADD ON **WANTS 10:00AM **TRACHEOTOMY performed by Sadiq Johnson MD at [address] [facility] 04/02/2020    TRACHEOTOMY EXCHANGE performed by Sadiq Johnson MD at [place] [address] 03/17/2020    BEDSIDE EGD ESOPHAGOGASTRODUODENOSCOPY (ICU) performed by Sadiq Johnson MD at [address] [place] [address] N/A 05/18/2020    EGD BIOPSY performed by Miracle Clarke MD at [facility]  05/18/2020    EGD DILATION BALLOON performed by Miracle Clarke MD at [address] [place] [address] N/A 07/06/2020    ** CASE IN O.R. WITH GI STAFF** EGD ESOPHAGOGASTRODUODENOSCOPY performed by Desiree Burton MD at  [place] [address] UPPER GASTROINTESTINAL ENDOSCOPY N/A 11/09/2020    EGD DIAGNOSTIC ONLY performed by Karissa Rosen MD at [address] [facility]  02/08/2021    - EGD WITH REMOVAL PEG TUBE,ERCP STENT REMOVAL,ERCP STONE REMOVAL       Medications:     pantoprazole  40 mg Oral QAM AC    meropenem  1,000 mg IntraVENous Q12H    sodium chloride flush  5-40 mL IntraVENous 2 times per day    enoxaparin  30 mg SubCUTAneous Daily    busPIRone  20 mg Oral TID    amitriptyline  25 mg Oral Nightly    budesonide-formoterol  2 puff Inhalation BID    metoclopramide  5 mg Oral TID AC    polyethylene glycol  17 g Oral Every Other Day    QUEtiapine  25 mg Oral BID    atorvastatin  40 mg Oral Daily    sucralfate  1 g Oral 4x Daily    traZODone  100 mg Oral Nightly    tamsulosin  0.4 mg Oral Daily    fentanNYL  25 mcg IntraVENous Once     PRN Meds include: oxybutynin, ondansetron, sodium chloride flush, sodium chloride, acetaminophen **OR** acetaminophen, LORazepam, calcium carbonate, magnesium hydroxide, morphine    Objective:   /68   Pulse 82   Temp (P) 97.1 °F (36.2 °C) (Temporal)   Resp 28   Ht 5' 2\" (1.575 m)   Wt 184 lb 8.4 oz (83.7 kg)   SpO2 93%   BMI 33.75 kg/m²     Blood pressure range: Systolic (75TYK), MVH:909 , Min:102 , KLS:383   ; Diastolic (94RWB), OVF:55, Min:57, Max:74      ROS:  Review of Systems   Constitutional: Positive for appetite change. Negative for activity change, chills, diaphoresis, fatigue, fever and unexpected weight change. HENT: Negative for ear discharge, ear pain, facial swelling, sinus pressure, sinus pain and voice change. Eyes: Negative for photophobia, pain, discharge, redness, itching and visual disturbance. Respiratory: Negative for cough, choking, shortness of breath and wheezing. Cardiovascular: Negative for chest pain, palpitations and leg swelling. Gastrointestinal: Negative for constipation, diarrhea and vomiting.    Endocrine: Negative for polydipsia and polyuria. Skin: Negative for color change and rash. Neurological: Negative for dizziness, tremors, seizures, syncope, facial asymmetry, speech difficulty, weakness, light-headedness, numbness and headaches. Psychiatric/Behavioral: Negative for agitation, behavioral problems, confusion, sleep disturbance and suicidal ideas. The patient is not nervous/anxious and is not hyperactive. NEUROLOGIC EXAMINATION  Physical Exam  Constitutional:       Appearance: Normal appearance. HENT:      Head: Normocephalic and atraumatic. Mouth/Throat:      Mouth: Mucous membranes are moist.   Eyes:      Extraocular Movements: Extraocular movements intact. Pupils: Pupils are equal, round, and reactive to light. Cardiovascular:      Rate and Rhythm: Normal rate and regular rhythm. Pulmonary:      Effort: Pulmonary effort is normal.      Breath sounds: Normal breath sounds. Abdominal:      General: Abdomen is flat. Palpations: Abdomen is soft. Musculoskeletal:      Cervical back: Normal range of motion and neck supple. Skin:     General: Skin is warm and dry. Neurological:      General: No focal deficit present. Mental Status: She is alert and oriented to person, place, and time. GCS: GCS eye subscore is 4. GCS verbal subscore is 5. GCS motor subscore is 6. Cranial Nerves: No cranial nerve deficit, dysarthria or facial asymmetry. Sensory: No sensory deficit. Motor: No weakness, tremor, atrophy, abnormal muscle tone, seizure activity or pronator drift. Coordination: Coordination normal. Heel to Shin Test normal.      Deep Tendon Reflexes: Babinski sign absent on the right side. Babinski sign absent on the left side. Reflex Scores:       Tricep reflexes are 2+ on the right side and 2+ on the left side. Bicep reflexes are 2+ on the right side and 2+ on the left side.        Brachioradialis reflexes are 2+ on the right side and 2+ on the left side.       Patellar reflexes are 2+ on the right side and 2+ on the left side. Achilles reflexes are 2+ on the right side and 2+ on the left side. .  Neurologic Exam     Mental Status   Oriented to person, place, and time. Cranial Nerves     CN III, IV, VI   Pupils are equal, round, and reactive to light. Gait, Coordination, and Reflexes     Reflexes   Right brachioradialis: 2+  Left brachioradialis: 2+  Right biceps: 2+  Left biceps: 2+  Right triceps: 2+  Left triceps: 2+  Right patellar: 2+  Left patellar: 2+  Right achilles: 2+  Left achilles: 2+       Lab Results:   CBC:   Recent Labs     11/23/21 2043 11/24/21  0631   WBC 8.9 7.9   HGB 13.4 12.3    155     BMP:    Recent Labs     11/23/21 2043 11/23/21 2101 11/24/21  0631   *  --  136   K 4.2  --  3.9   CL 98  --  102   CO2 20  --  24   BUN 17  --  13   CREATININE 1.18* 1.44* 1.21*   GLUCOSE 132*  --  103*         Lab Results   Component Value Date    CHOL 203 (H) 12/14/2020    LDLCHOLESTEROL 106 12/14/2020    HDL 43 12/14/2020    TRIG 268 (H) 12/14/2020    ALT 15 11/24/2021    AST 15 11/24/2021    INR 1.1 11/24/2021    LABA1C 5.0 12/14/2020       No results found for: PHENYTOIN, PHENYTOIN, VALPROATE, CBMZ    IMAGING  CTA H/N W CONTRAST   No large vessel occlusion visualized within the head or neck. Incidentally noted pulmonary emphysema and dilated, air and fluid-filled esophagus. CT H W/O CONTRAST   1. No acute intracranial abnormality, specifically no evidence of acute infarct. 2. Minimal microvascular ischemic disease. CT Chest w/o contrast    1. Basilar opacities, right greater than left, with central airway congestion and filling of the peripheral subsegmental bronchi in the right lower lobe. Overall these features are suggestive of chronic/recurrent aspiration. 2. Hiatal hernia with diffuse mild esophageal dilatation with fluid, which may represent reflux or stasis. 3. Emphysema.        MRI brain w/o contrast  1. No acute intracranial abnormality, specifically no evidence of acute infarct. 2. Minimal microvascular ischemic disease. ASSESSMENT  1. Toxic metabolic encephalopathy due to dehydration, lactic acidosis, resolved   2. Episode of expressive aphasia, resolved  Due to #1. She had similar episode 1 year ago. EEG at that time showed left frontal slowing consistent with focal structural abnormality. MRI brain was unremarkable during this admission.  Would obtain a repeat EEG that can be done as outpatient     RECOMMENDATIONS    EEG    Rest of management as per primary     Follow up with neurology in 4 to 6 weeks after discharge      Jana Rogers MD  PGY-4 Neurology Resident

## 2021-11-26 NOTE — PROGRESS NOTES
[facility] - Progress Note    Today's Date and Time: 11/26/2021, 8:32 AM    Impression :   · Recurrent Aspiration pneumonia  · Hiatal hernia  · Distal esophageal dilatation with stasis  · Anxiety  · Hx of MDRO, ESBL + bacteria    Recommendations:   · Continue meropenem. · Descalate with culture results    Medical Decision Making/Summary/Discussion:   ·   Infection Control Recommendations   · Ramsey Precautions  · Contact Isolation Hx Klebsiella ESBL +    Antimicrobial Stewardship Recommendations     · Simplification of therapy  · Targeted therapy    Coordination of Outpatient Care:   · Estimated Length of IV antimicrobials:TBD  · Patient will need Midline Catheter Insertion: no  · Patient will need PICC line Insertion:No  · Patient will need: Home IV , [place],  SNF,  LTAC: TBD  · Patient will need outpatient wound care:No    Chief complaint/reason for consultation:   · Aspiration pneumonia      History of Present Illness:   Tigist Jane is a 68y.o.-year-old  female who was initially admitted on 11/23/2021. Patient seen at the request of Dr. Lesly Deshpande    [facility] of anxiety/depression    INITIAL HISTORY:    Patient  presented with a fever, T 103, and 3 weeks of nausea/vomiting and abdominal pain, constipation x 5 days, expressive aphasia. Denied any headache or weakness in her upper or lower extremities. At the ED, she was Saturating on room air,   Pertinent labs include Cr 1.18, lactic acid 2.3, wbc 8.9,     CT chest concerning for chronic recurrent aspiration pneumonia. Hiatal hernia, distal esophageal distension with stasis of refluxed fluid. ID consulted      CURRENT EVALUATION: 11/26/21     Continue meropenem. Patient underwent EGD with findings of:  · Tortuous esophagus with dilated distal third. · Evidence of prior fundoplication which appeared to be partially undone  · Sarah-Abrams tear.   · Severely congested and inflamed appearing proximal stomach  · Normal distal stomach  · Normal examined duodenum     Afebrile  VS stable    On room air  RR 14-->19-->16  02 sat 94    Patient reports nausea, vomiting, and significant abdominal pain. She says she is having some difficulty tolerating her full liquid diet. She reports that she has not had any more episodes of aphasia since her admission. Labs: 11/26/2021      BUN:13  Creatinine: 1.21    Hgb: 12.3  Platelet: 821  WBC: 7.9    Lactic acid 2.3-->0.9    Cultures:  Urine:  · 11/23/2021: No growth  Blood:  · 11/23/2021 x 2 No growth  · 11/26/21: x 2 no growth   Sputum :  ·   Wound:     CSF     CT chest 11/24/2021    CT Head WO Contrast    Result Date: 11/23/2021  No CT evidence of an acute infarct. The findings were sent to the Radiology Results [address] at 8:54 pm on 11/23/2021to be communicated to a licensed caregiver. CT CHEST WO CONTRAST    Result Date: 11/24/2021  1. Basilar opacities, right greater than left, with central airway congestion and filling of the peripheral subsegmental bronchi in the right lower lobe. Overall these features are suggestive of chronic/recurrent aspiration. 2.  Hiatal hernia with diffuse mild esophageal dilatation with fluid, which may represent reflux or stasis. 3.  Emphysema. CT ABDOMEN PELVIS W IV CONTRAST Additional Contrast? None    Result Date: 11/23/2021  Moderate hiatal hernia with a fluid-filled patulous distal esophagus which is similar to prior comparison exams. Distended stomach with a normal caliber duodenum. Query possible gastroparesis or functional outlet obstruction. Possible findings of proctitis. Correlate clinically. Hepatomegaly with steatosis, unchanged. Findings in the dependent lung bases which are suspicious for sequelae of aspiration pneumonia in the appropriate clinical setting.      XR CHEST PORTABLE    Result Date: 11/23/2021  Chronic lung changes with right basilar airspace opacity concerning for underlying pneumonia in the appropriate clinical setting. Correlate with aspiration risk. CTA HEAD NECK W CONTRAST    Result Date: 11/23/2021  No large vessel occlusion visualized within the head or neck. Incidentally noted pulmonary emphysema and dilated, air and fluid-filled esophagus. Discussed with patient, RN, family. I have personally reviewed the past medical history, past surgical history, medications, social history, and family history, and I have updated the database accordingly.   Past Medical History:     Past Medical History:   Diagnosis Date    Anxiety     Arthritis     Back pain     Bronchitis     Caffeine use     8 coffee / day    Carpal tunnel syndrome     Cataracts, bilateral     Constipation     Depression     Diarrhea     GERD (gastroesophageal reflux disease)     H/O gastric ulcer     Hyperlipidemia     Hypertension     Mumps     MVP (mitral valve prolapse)     Dr. Sarah Augustin in May 2019    Recurrent UTI     Dr. Richard Parents    Sinusitis     Tracheostomy in place [facility])     Ulcerative esophagitis     Wellness examination     Dr. Lenora Boyce seen in Jan 2020       Past Surgical  History:     Past Surgical History:   Procedure Laterality Date    APPENDECTOMY      CARPAL TUNNEL RELEASE      CHOLECYSTECTOMY, LAPAROSCOPIC N/A 05/22/2020    XI LAPAROSCOPIC ROBOTIC CHOLECYSTECTOMY, PYLOROPLASTY performed by Renny Cox MD at [facility] ERCP  05/21/2020    with stent insertion, balloon dilation, sphinctereotomy    ERCP  05/21/2020    ** CASE IN OR WITY GI STAFF** ERCP STENT INSERTION performed by [facility]ia Zurita MD at [facility]    ERCP  05/21/2020    ** CASE IN OR WITH GI STAFF**ERCP SPHINCTER/PAPILLOTOMY performed by Kia Zurita MD at [facility]    ERCP  05/21/2020    ** CASE IN OR WITH GI STAFF**ERCP DILATION BALLOON performed by Kia Zurita MD at [facility]    ERCP N/A 2/8/2021    ERCP STENT REMOVAL performed by Marney Hammans, MD at [facility]    ERCP  2/8/2021    ERCP STONE REMOVAL performed by Preeti Serrano MD at [address]      patricia IOL    GASTRIC FUNDOPLICATION N/A 93/96/2047    XI LAPAROSCOPIC ROBOTIC HIATAL HERNIA REPAIR, CHERYL FUNDOPLICATION performed by Richard Freitas MD at [facility] N/A 03/27/2020    EGD PEG TUBE PLACEMENT performed by Richard Freitas MD at [facility] N/A 2/8/2021    **CASE IN O.R. W/ GI STAFF - EGD WITH REMOVAL PEG TUBE performed by Preeti Serrano MD at [facility]    HAND SURGERY      [facility]. CATH POWER PICC TRIPLE  03/04/2020         HYSTERECTOMY      Abdominal    JOINT REPLACEMENT Right     right hip    OVARY REMOVAL      RHINOPLASTY      TONSILLECTOMY      TOTAL HIP ARTHROPLASTY      TOTAL NEPHRECTOMY      atrophic from infections, age 13   Corine Lakhanio TRACHEOSTOMY N/A 03/27/2020    **ADD ON **WANTS 10:00AM **TRACHEOTOMY performed by Richard Freitas MD at [address] [place] [address] N/A 04/02/2020    TRACHEOTOMY EXCHANGE performed by Richard Freitas MD at [address] [place] [address] N/A 03/17/2020    BEDSIDE EGD ESOPHAGOGASTRODUODENOSCOPY (ICU) performed by Richard Freitas MD at [address] [place] [address] N/A 05/18/2020    EGD BIOPSY performed by Olesya Pablo MD at [address] [place] [address]  05/18/2020    EGD DILATION BALLOON performed by Olesya Pablo MD at [address] [place] [address] N/A 07/06/2020    ** CASE IN O.R. WITH GI STAFF** EGD ESOPHAGOGASTRODUODENOSCOPY performed by Marcy Cannon MD at [address] [place] [address] 11/09/2020    EGD DIAGNOSTIC ONLY performed by Darien Escamilla MD at [address] [place] [address]  02/08/2021    - EGD WITH REMOVAL PEG TUBE,ERCP STENT REMOVAL,ERCP STONE REMOVAL       Medications:      pantoprazole  40 mg Oral QAM AC    meropenem  1,000 mg IntraVENous Q12H    sodium chloride flush  5-40 mL IntraVENous 2 times per day    enoxaparin  30 mg SubCUTAneous Daily    busPIRone  20 mg Oral TID    amitriptyline  25 mg Oral Nightly    budesonide-formoterol  2 puff Inhalation BID    metoclopramide  5 mg Oral TID AC    polyethylene glycol  17 g Oral Every Other Day    QUEtiapine  25 mg Oral BID    atorvastatin  40 mg Oral Daily    sucralfate  1 g Oral 4x Daily    traZODone  100 mg Oral Nightly    tamsulosin  0.4 mg Oral Daily    fentanNYL  25 mcg IntraVENous Once       Social History:     Social History     Socioeconomic History    Marital status:      Spouse name: Not on file    Number of children: 2    Years of education: Not on file    Highest education level: Not on file   Occupational History    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   Tobacco Use    Smoking status: Former Smoker     Packs/day: 1.00     Years: 50.00     Pack years: 50.00     Types: Cigarettes    Smokeless tobacco: Never Used    Tobacco comment: quit 1 year ago    Vaping Use    Vaping Use: Former    Substances: Always   Substance and Sexual Activity    Alcohol use: No    Drug use: No    Sexual activity: Never   Other Topics Concern    Not on file   Social History Narrative    Not on file     Social Determinants of Health     Financial Resource Strain:     Difficulty of Paying Living Expenses: Not on file   Food Insecurity:     Worried About [address] in the Last Year: Not on file    [facility] in the Last Year: Not on file   Transportation Needs:     Lack of Transportation (Medical): Not on file    Lack of Transportation (Non-Medical):  Not on file   Physical Activity:     Days of Exercise per Week: Not on file    Minutes of Exercise per Session: Not on file   Stress:     Feeling of Stress : Not on file   Social Connections:     Frequency of Communication with Friends and Family: Not on file    Frequency of Social Gatherings with Friends and Family: Not on file    Attends Zoroastrianism Services: Not on file    Active Member of Clubs or Organizations: Not on file    Attends Club or Organization Meetings: Not on file    Marital Status: Not on file   Intimate Partner Violence:     Fear of Current or Ex-Partner: Not on file    Emotionally Abused: Not on file    Physically Abused: Not on file    Sexually Abused: Not on file   Housing Stability:     Unable to Pay for Housing in the Last Year: Not on file    Number of [place] in the Last Year: Not on file    Unstable Housing in the Last Year: Not on file       Family History:     Family History   Problem Relation Age of Onset    Cancer Mother         uterine    Heart Attack Mother     Heart Attack Father     Other Maternal Grandfather         foot gangrene    Other Paternal Grandmother         bleeding ulcers    Other Paternal Grandfather         lung cancer        Allergies:   Prednisone, Compazine [prochlorperazine maleate], Penicillin v potassium, and Penicillins     Review of Systems:   Constitutional: No fevers or chills. No systemic complaints  Head: No headaches  Eyes: No double vision or blurry vision. No conjunctival inflammation. ENT: No sore throat or runny nose. . No hearing loss, tinnitus or vertigo. Cardiovascular: No chest pain or palpitations. No shortness of breath. No HOLLAND  Lung: No shortness of breath or cough. No sputum production  Abdomen: Nausea, vomiting, and abdominal pain. [place] No cramps or diarrhea  Genitourinary: No increased urinary frequency, or dysuria. No hematuria. No suprapubic or CVA pain  Musculoskeletal: No muscle aches or pains. No joint effusions, swelling or deformities  Hematologic: No bleeding or bruising. Neurologic: No headache, weakness, numbness, or tingling. Integument: No rash, no ulcers. Psychiatric: No depression. Endocrine: No polyuria, no polydipsia, no polyphagia.     Physical Examination :     Patient Vitals for the past 8 hrs: BP Temp Temp src Pulse Resp SpO2 Weight   11/26/21 0807 -- -- -- -- 16 94 % --   11/26/21 0730 134/68 97.1 °F (36.2 °C) Temporal 82 28 93 % --   11/26/21 0540 -- -- -- -- -- -- 184 lb 8.4 oz (83.7 kg)   11/26/21 0326 (!) 128/59 98.5 °F (36.9 °C) Oral 79 23 93 % --   11/26/21 0325 -- -- -- 79 25 (!) 87 % --   11/26/21 0035 (!) 102/57 97 °F (36.1 °C) Temporal 68 19 94 % --     General Appearance: Awake, alert, and in no apparent distress  Head:  Normocephalic, no trauma  Eyes: Pupils equal, round, reactive to light and accommodation; extraocular movements intact; sclera anicteric; conjunctivae pink. No embolic phenomena. ENT: Oropharynx clear, without erythema, exudate, or thrush. No tenderness of sinuses. Mouth/throat: mucosa pink and moist. No lesions. Dentition in good repair. Neck:Supple, without lymphadenopathy. Thyroid normal, No bruits. Pulmonary/Chest: Clear to auscultation, without wheezes, rales, or rhonchi. No dullness to percussion. No egophony. Cardiovascular: Regular rate and rhythm without murmurs, rubs, or gallops. Abdomen: Soft, non tender. Bowel sounds normal. No organomegaly  All four Extremities: No cyanosis, clubbing, edema, or effusions. Neurologic: No gross sensory or motor deficits. Skin: Warm and dry with good turgor. No signs of peripheral arterial or venous insufficiency. No ulcerations. No open wounds. Medical Decision Making -Laboratory:     Updated   Order   11/26/21 6769  Culture, Blood 1   Collected: 11/23/21 2040  Preliminary result  Specimen: Blood    Specimen Description . BLOOD P Culture NO GROWTH 3 DAYS P   Special Requests RT AC 20 ML P            11/26/21 0737  Culture, Blood 1   Collected: 11/23/21 2149  Preliminary result  Specimen: Blood    Specimen Description . BLOOD P Culture NO GROWTH 3 DAYS P   Special Requests 13ML R HAND P                I have independently reviewed/ordered the following labs:    CBC with Differential:   Recent Labs     11/23/21 2048 11/24/21  0631   WBC 8.9 7.9   HGB 13.4 12.3   HCT 42.2 38.1    155   LYMPHOPCT 8*  --    MONOPCT 9  --      BMP:   Recent Labs     11/23/21  2043 11/23/21  2101 11/24/21  0631   *  --  136   K 4.2  --  3.9   CL 98  --  102   CO2 20  --  24   BUN 17  --  13   CREATININE 1.18* 1.44* 1.21*     Hepatic Function Panel:   Recent Labs     11/24/21  0631   PROT 6.1*   LABALBU 3.4*   BILITOT 0.39   ALKPHOS 118*   ALT 15   AST 15     No results for input(s): RPR in the last 72 hours. No results for input(s): HIV in the last 72 hours. No results for input(s): BC in the last 72 hours. Lab Results   Component Value Date    MUCUS NOT REPORTED 11/23/2021    RBC 4.30 11/24/2021    TRICHOMONAS NOT REPORTED 11/23/2021    WBC 7.9 11/24/2021    YEAST NOT REPORTED 11/23/2021    TURBIDITY Clear 11/23/2021     Lab Results   Component Value Date    CREATININE 1.21 11/24/2021    GLUCOSE 103 11/24/2021       Medical Decision Making-Imaging:       Medical Decision Making-Other: Thank you for allowing us to participate in the care of this patient. Please call with questions. Rob Arce participated in the evaluation of this patient. ATTESTATION:    I have discussed the case, including pertinent history and exam findings with the residents and students. I have seen and examined the patient and the key elements of the encounter have been performed by me. I was present when the student obtained his information or examined the patient. I have reviewed the laboratory data, other diagnostic studies and discussed them with the residents. I have updated the medical record where necessary. I agree with the assessment, plan and orders as documented by the resident/ student.     Luis Mclain MD.      Pager: [phone] - Office: [phone]

## 2021-11-27 LAB
ABSOLUTE EOS #: 0.24 K/UL (ref 0–0.44)
ABSOLUTE IMMATURE GRANULOCYTE: 0.06 K/UL (ref 0–0.3)
ABSOLUTE LYMPH #: 0.94 K/UL (ref 1.1–3.7)
ABSOLUTE MONO #: 0.83 K/UL (ref 0.1–1.2)
ALBUMIN SERPL-MCNC: 2.9 G/DL (ref 3.5–5.2)
ANION GAP SERPL CALCULATED.3IONS-SCNC: 13 MMOL/L (ref 9–17)
BASOPHILS # BLD: 1 % (ref 0–2)
BASOPHILS ABSOLUTE: 0.03 K/UL (ref 0–0.2)
BUN BLDV-MCNC: 7 MG/DL (ref 8–23)
BUN/CREAT BLD: ABNORMAL (ref 9–20)
CALCIUM SERPL-MCNC: 9 MG/DL (ref 8.6–10.4)
CHLORIDE BLD-SCNC: 105 MMOL/L (ref 98–107)
CO2: 17 MMOL/L (ref 20–31)
CREAT SERPL-MCNC: 0.76 MG/DL (ref 0.5–0.9)
DIFFERENTIAL TYPE: ABNORMAL
EKG ATRIAL RATE: 99 BPM
EKG P AXIS: 83 DEGREES
EKG P-R INTERVAL: 152 MS
EKG Q-T INTERVAL: 356 MS
EKG QRS DURATION: 74 MS
EKG QTC CALCULATION (BAZETT): 456 MS
EKG R AXIS: 33 DEGREES
EKG T AXIS: 12 DEGREES
EKG VENTRICULAR RATE: 99 BPM
EOSINOPHILS RELATIVE PERCENT: 5 % (ref 1–4)
GFR AFRICAN AMERICAN: >60 ML/MIN
GFR NON-AFRICAN AMERICAN: >60 ML/MIN
GFR SERPL CREATININE-BSD FRML MDRD: ABNORMAL ML/MIN/{1.73_M2}
GFR SERPL CREATININE-BSD FRML MDRD: ABNORMAL ML/MIN/{1.73_M2}
GLUCOSE BLD-MCNC: 104 MG/DL (ref 70–99)
HCT VFR BLD CALC: 37.6 % (ref 36.3–47.1)
HCT VFR BLD CALC: 38.2 % (ref 36.3–47.1)
HEMOGLOBIN: 11.5 G/DL (ref 11.9–15.1)
HEMOGLOBIN: 12.2 G/DL (ref 11.9–15.1)
IMMATURE GRANULOCYTES: 1 %
LACTIC ACID, SEPSIS WHOLE BLOOD: 0.8 MMOL/L (ref 0.5–1.9)
LACTIC ACID, SEPSIS: NORMAL MMOL/L (ref 0.5–1.9)
LYMPHOCYTES # BLD: 20 % (ref 24–43)
MAGNESIUM: 2.1 MG/DL (ref 1.6–2.6)
MCH RBC QN AUTO: 27.6 PG (ref 25.2–33.5)
MCH RBC QN AUTO: 27.8 PG (ref 25.2–33.5)
MCHC RBC AUTO-ENTMCNC: 30.6 G/DL (ref 28.4–34.8)
MCHC RBC AUTO-ENTMCNC: 31.9 G/DL (ref 28.4–34.8)
MCV RBC AUTO: 86.4 FL (ref 82.6–102.9)
MCV RBC AUTO: 91 FL (ref 82.6–102.9)
MONOCYTES # BLD: 17 % (ref 3–12)
MYOGLOBIN: 30 NG/ML (ref 25–58)
MYOGLOBIN: 31 NG/ML (ref 25–58)
NRBC AUTOMATED: 0 PER 100 WBC
NRBC AUTOMATED: 0 PER 100 WBC
PDW BLD-RTO: 15.7 % (ref 11.8–14.4)
PDW BLD-RTO: 15.7 % (ref 11.8–14.4)
PHOSPHORUS: 2.7 MG/DL (ref 2.6–4.5)
PLATELET # BLD: 177 K/UL (ref 138–453)
PLATELET # BLD: 180 K/UL (ref 138–453)
PLATELET ESTIMATE: ABNORMAL
PMV BLD AUTO: 9.9 FL (ref 8.1–13.5)
PMV BLD AUTO: 9.9 FL (ref 8.1–13.5)
POTASSIUM SERPL-SCNC: 3.8 MMOL/L (ref 3.7–5.3)
RBC # BLD: 4.13 M/UL (ref 3.95–5.11)
RBC # BLD: 4.42 M/UL (ref 3.95–5.11)
RBC # BLD: ABNORMAL 10*6/UL
SEG NEUTROPHILS: 56 % (ref 36–65)
SEGMENTED NEUTROPHILS ABSOLUTE COUNT: 2.71 K/UL (ref 1.5–8.1)
SODIUM BLD-SCNC: 135 MMOL/L (ref 135–144)
TROPONIN INTERP: ABNORMAL
TROPONIN INTERP: ABNORMAL
TROPONIN T: ABNORMAL NG/ML
TROPONIN T: ABNORMAL NG/ML
TROPONIN, HIGH SENSITIVITY: 22 NG/L (ref 0–14)
TROPONIN, HIGH SENSITIVITY: 26 NG/L (ref 0–14)
WBC # BLD: 4.8 K/UL (ref 3.5–11.3)
WBC # BLD: 6.1 K/UL (ref 3.5–11.3)
WBC # BLD: ABNORMAL 10*3/UL

## 2021-11-27 PROCEDURE — 6370000000 HC RX 637 (ALT 250 FOR IP): Performed by: INTERNAL MEDICINE

## 2021-11-27 PROCEDURE — 2580000003 HC RX 258: Performed by: NURSE PRACTITIONER

## 2021-11-27 PROCEDURE — 36415 COLL VENOUS BLD VENIPUNCTURE: CPT

## 2021-11-27 PROCEDURE — 84484 ASSAY OF TROPONIN QUANT: CPT

## 2021-11-27 PROCEDURE — 83605 ASSAY OF LACTIC ACID: CPT

## 2021-11-27 PROCEDURE — 6370000000 HC RX 637 (ALT 250 FOR IP): Performed by: NURSE PRACTITIONER

## 2021-11-27 PROCEDURE — 6370000000 HC RX 637 (ALT 250 FOR IP): Performed by: STUDENT IN AN ORGANIZED HEALTH CARE EDUCATION/TRAINING PROGRAM

## 2021-11-27 PROCEDURE — 6360000002 HC RX W HCPCS: Performed by: NURSE PRACTITIONER

## 2021-11-27 PROCEDURE — 83874 ASSAY OF MYOGLOBIN: CPT

## 2021-11-27 PROCEDURE — 80069 RENAL FUNCTION PANEL: CPT

## 2021-11-27 PROCEDURE — 2060000000 HC ICU INTERMEDIATE R&B

## 2021-11-27 PROCEDURE — 85027 COMPLETE CBC AUTOMATED: CPT

## 2021-11-27 PROCEDURE — 85025 COMPLETE CBC W/AUTO DIFF WBC: CPT

## 2021-11-27 PROCEDURE — 99232 SBSQ HOSP IP/OBS MODERATE 35: CPT | Performed by: INTERNAL MEDICINE

## 2021-11-27 PROCEDURE — 93010 ELECTROCARDIOGRAM REPORT: CPT | Performed by: INTERNAL MEDICINE

## 2021-11-27 PROCEDURE — 94640 AIRWAY INHALATION TREATMENT: CPT

## 2021-11-27 PROCEDURE — 6360000002 HC RX W HCPCS: Performed by: INTERNAL MEDICINE

## 2021-11-27 PROCEDURE — 2700000000 HC OXYGEN THERAPY PER DAY

## 2021-11-27 PROCEDURE — 83735 ASSAY OF MAGNESIUM: CPT

## 2021-11-27 RX ORDER — LIDOCAINE HYDROCHLORIDE 20 MG/ML
15 SOLUTION OROPHARYNGEAL
Status: DISCONTINUED | OUTPATIENT
Start: 2021-11-27 | End: 2021-11-28

## 2021-11-27 RX ORDER — AMITRIPTYLINE HYDROCHLORIDE 25 MG/1
25 TABLET, FILM COATED ORAL 2 TIMES DAILY
Status: DISCONTINUED | OUTPATIENT
Start: 2021-11-27 | End: 2021-11-30 | Stop reason: HOSPADM

## 2021-11-27 RX ORDER — ESCITALOPRAM OXALATE 10 MG/1
10 TABLET ORAL DAILY
Status: DISCONTINUED | OUTPATIENT
Start: 2021-11-27 | End: 2021-11-30 | Stop reason: HOSPADM

## 2021-11-27 RX ORDER — HYDROCODONE BITARTRATE AND ACETAMINOPHEN 5; 325 MG/1; MG/1
1 TABLET ORAL 2 TIMES DAILY
Status: DISCONTINUED | OUTPATIENT
Start: 2021-11-27 | End: 2021-11-30 | Stop reason: HOSPADM

## 2021-11-27 RX ORDER — BUSPIRONE HYDROCHLORIDE 10 MG/1
10 TABLET ORAL 2 TIMES DAILY
Status: DISCONTINUED | OUTPATIENT
Start: 2021-11-27 | End: 2021-11-30 | Stop reason: HOSPADM

## 2021-11-27 RX ORDER — LACTULOSE 10 G/15ML
20 SOLUTION ORAL 3 TIMES DAILY
Status: COMPLETED | OUTPATIENT
Start: 2021-11-27 | End: 2021-11-28

## 2021-11-27 RX ADMIN — OXYBUTYNIN CHLORIDE 5 MG: 5 TABLET ORAL at 15:25

## 2021-11-27 RX ADMIN — HYDROCODONE BITARTRATE AND ACETAMINOPHEN 1 TABLET: 5; 325 TABLET ORAL at 20:39

## 2021-11-27 RX ADMIN — TRAZODONE HYDROCHLORIDE 100 MG: 100 TABLET ORAL at 20:39

## 2021-11-27 RX ADMIN — QUETIAPINE FUMARATE 25 MG: 25 TABLET ORAL at 20:39

## 2021-11-27 RX ADMIN — LACTULOSE 20 G: 20 SOLUTION ORAL at 20:39

## 2021-11-27 RX ADMIN — MEROPENEM 1000 MG: 1 INJECTION, POWDER, FOR SOLUTION INTRAVENOUS at 23:02

## 2021-11-27 RX ADMIN — METOCLOPRAMIDE 5 MG: 5 TABLET ORAL at 11:41

## 2021-11-27 RX ADMIN — MORPHINE SULFATE 2 MG: 4 INJECTION, SOLUTION INTRAMUSCULAR; INTRAVENOUS at 02:52

## 2021-11-27 RX ADMIN — METOCLOPRAMIDE 5 MG: 5 TABLET ORAL at 15:25

## 2021-11-27 RX ADMIN — ONDANSETRON 4 MG: 2 INJECTION INTRAMUSCULAR; INTRAVENOUS at 09:28

## 2021-11-27 RX ADMIN — MORPHINE SULFATE 2 MG: 4 INJECTION, SOLUTION INTRAMUSCULAR; INTRAVENOUS at 09:38

## 2021-11-27 RX ADMIN — ANTACID TABLETS 1000 MG: 500 TABLET, CHEWABLE ORAL at 09:27

## 2021-11-27 RX ADMIN — BUSPIRONE HYDROCHLORIDE 10 MG: 10 TABLET ORAL at 20:39

## 2021-11-27 RX ADMIN — ONDANSETRON 4 MG: 2 INJECTION INTRAMUSCULAR; INTRAVENOUS at 16:39

## 2021-11-27 RX ADMIN — ATORVASTATIN CALCIUM 40 MG: 80 TABLET, FILM COATED ORAL at 09:27

## 2021-11-27 RX ADMIN — ENOXAPARIN SODIUM 30 MG: 100 INJECTION SUBCUTANEOUS at 09:28

## 2021-11-27 RX ADMIN — BUDESONIDE AND FORMOTEROL FUMARATE DIHYDRATE 2 PUFF: 160; 4.5 AEROSOL RESPIRATORY (INHALATION) at 21:08

## 2021-11-27 RX ADMIN — BUSPIRONE HYDROCHLORIDE 20 MG: 10 TABLET ORAL at 09:27

## 2021-11-27 RX ADMIN — ESCITALOPRAM OXALATE 10 MG: 10 TABLET ORAL at 11:41

## 2021-11-27 RX ADMIN — SODIUM CHLORIDE, PRESERVATIVE FREE 5 ML: 5 INJECTION INTRAVENOUS at 20:39

## 2021-11-27 RX ADMIN — SUCRALFATE 1 G: 1 TABLET ORAL at 09:27

## 2021-11-27 RX ADMIN — LACTULOSE 20 G: 20 SOLUTION ORAL at 15:25

## 2021-11-27 RX ADMIN — TAMSULOSIN HYDROCHLORIDE 0.4 MG: 0.4 CAPSULE ORAL at 09:27

## 2021-11-27 RX ADMIN — BUDESONIDE AND FORMOTEROL FUMARATE DIHYDRATE 2 PUFF: 160; 4.5 AEROSOL RESPIRATORY (INHALATION) at 08:33

## 2021-11-27 RX ADMIN — PANTOPRAZOLE SODIUM 40 MG: 40 TABLET, DELAYED RELEASE ORAL at 15:25

## 2021-11-27 RX ADMIN — SUCRALFATE 1 G: 1 TABLET ORAL at 11:41

## 2021-11-27 RX ADMIN — MEROPENEM 1000 MG: 1 INJECTION, POWDER, FOR SOLUTION INTRAVENOUS at 02:35

## 2021-11-27 RX ADMIN — SODIUM CHLORIDE, PRESERVATIVE FREE 10 ML: 5 INJECTION INTRAVENOUS at 09:29

## 2021-11-27 RX ADMIN — HYDROCODONE BITARTRATE AND ACETAMINOPHEN 1 TABLET: 5; 325 TABLET ORAL at 11:41

## 2021-11-27 RX ADMIN — MEROPENEM 1000 MG: 1 INJECTION, POWDER, FOR SOLUTION INTRAVENOUS at 11:41

## 2021-11-27 RX ADMIN — LORAZEPAM 1 MG: 0.5 TABLET ORAL at 09:28

## 2021-11-27 RX ADMIN — LORAZEPAM 1 MG: 0.5 TABLET ORAL at 15:25

## 2021-11-27 RX ADMIN — QUETIAPINE FUMARATE 25 MG: 25 TABLET ORAL at 09:27

## 2021-11-27 RX ADMIN — AMITRIPTYLINE HYDROCHLORIDE 25 MG: 25 TABLET, FILM COATED ORAL at 20:39

## 2021-11-27 RX ADMIN — PANTOPRAZOLE SODIUM 40 MG: 40 TABLET, DELAYED RELEASE ORAL at 06:08

## 2021-11-27 RX ADMIN — MORPHINE SULFATE 2 MG: 4 INJECTION, SOLUTION INTRAMUSCULAR; INTRAVENOUS at 15:25

## 2021-11-27 RX ADMIN — METOCLOPRAMIDE 5 MG: 5 TABLET ORAL at 06:08

## 2021-11-27 RX ADMIN — SUCRALFATE 1 G: 1 TABLET ORAL at 20:39

## 2021-11-27 RX ADMIN — SUCRALFATE 1 G: 1 TABLET ORAL at 15:25

## 2021-11-27 ASSESSMENT — PAIN SCALES - GENERAL
PAINLEVEL_OUTOF10: 5
PAINLEVEL_OUTOF10: 7
PAINLEVEL_OUTOF10: 5

## 2021-11-27 NOTE — PLAN OF CARE
Problem: Falls - Risk of:  Goal: Will remain free from falls  Description: Will remain free from falls  Outcome: Ongoing     Problem: Falls - Risk of:  Goal: Absence of physical injury  Description: Absence of physical injury  Outcome: Ongoing     Problem: Skin Integrity:  Goal: Will show no infection signs and symptoms  Description: Will show no infection signs and symptoms  Outcome: Ongoing     Problem: Skin Integrity:  Goal: Absence of new skin breakdown  Description: Absence of new skin breakdown  Outcome: Ongoing     Problem: Anxiety:  Goal: Level of anxiety will decrease  Description: Level of anxiety will decrease  Outcome: Ongoing     Problem: Infection:  Goal: Will remain free from infection  Description: Will remain free from infection  Outcome: Ongoing     Problem: Safety:  Goal: Free from accidental physical injury  Description: Free from accidental physical injury  Outcome: Ongoing     Problem: Safety:  Goal: Free from intentional harm  Description: Free from intentional harm  Outcome: Ongoing     Problem: Daily Care:  Goal: Daily care needs are met  Description: Daily care needs are met  Outcome: Ongoing     Problem: Pain:  Goal: Patient's pain/discomfort is manageable  Description: Patient's pain/discomfort is manageable  Outcome: Ongoing     Problem: Pain:  Goal: Pain level will decrease  Description: Pain level will decrease  Outcome: Ongoing     Problem: Pain:  Goal: Control of acute pain  Description: Control of acute pain  Outcome: Ongoing     Problem: Skin Integrity:  Goal: Skin integrity will stabilize  Description: Skin integrity will stabilize  Outcome: Ongoing     Problem: Discharge Planning:  Goal: Patients continuum of care needs are met  Description: Patients continuum of care needs are met  Outcome: Ongoing

## 2021-11-27 NOTE — PROGRESS NOTES
[facility] - Progress Note    Today's Date and Time: 11/27/2021, 11:51 AM    Impression :   · Recurrent Aspiration pneumonia  · Hiatal hernia  · Distal esophageal dilatation with stasis  · Anxiety  · Hx of MDRO, ESBL + bacteria    Recommendations:   · Continue meropenem. Stop date 11-30-21    Medical Decision Making/Summary/Discussion:   ·   Infection Control Recommendations   · [place] Precautions  · Contact Isolation Hx Klebsiella ESBL +    Antimicrobial Stewardship Recommendations     · Simplification of therapy  · Targeted therapy    Coordination of Outpatient Care:   · Estimated Length of IV antimicrobials:TBD  · Patient will need Midline Catheter Insertion: no  · Patient will need PICC line Insertion:No  · Patient will need: Home IV , [place],  SNF,  LTAC: TBD  · Patient will need outpatient wound care:No    Chief complaint/reason for consultation:   · Aspiration pneumonia      History of Present Illness:   Holly Anglin is a 68y.o.-year-old  female who was initially admitted on 11/23/2021. Patient seen at the request of Dr. Namrata Hung    [facility] of anxiety/depression    INITIAL HISTORY:    Patient  presented with a fever, T 103, and 3 weeks of nausea/vomiting and abdominal pain, constipation x 5 days, expressive aphasia. Denied any headache or weakness in her upper or lower extremities. At the ED, she was Saturating on room air,   Pertinent labs include Cr 1.18, lactic acid 2.3, wbc 8.9,     CT chest concerning for chronic recurrent aspiration pneumonia. Hiatal hernia, distal esophageal distension with stasis of refluxed fluid. ID consulted      CURRENT EVALUATION: 11/27/21     Continue meropenem until 11-30-21. Patient underwent EGD with findings of:  · Tortuous esophagus with dilated distal third. · Evidence of prior fundoplication which appeared to be partially undone  · Sarah-Abrams tear.   · Severely congested and inflamed appearing proximal stomach  · Normal distal stomach  · Normal examined duodenum     Afebrile  VS stable    On room air  RR 14-->19-->16  02 sat 94    Patient feels better. Still has chronic sputum production with yellowish phlegm  Reports epigastric pain. Overall looks better    Labs: 11/27/2021      BUN:13  Creatinine: 1.21    Hgb: 12.3  Platelet: 159  WBC: 7.9    Lactic acid 2.3-->0.9    Cultures:  Urine:  · 11/23/2021: No growth  Blood:  · 11/23/2021 x 2 No growth  · 11/26/21: x 2 no growth   Sputum :  ·   Wound:     CSF     CT chest 11/24/2021    CT Head WO Contrast    Result Date: 11/23/2021  No CT evidence of an acute infarct. The findings were sent to the Radiology Results [address] at 8:54 pm on 11/23/2021to be communicated to a licensed caregiver. CT CHEST WO CONTRAST    Result Date: 11/24/2021  1. Basilar opacities, right greater than left, with central airway congestion and filling of the peripheral subsegmental bronchi in the right lower lobe. Overall these features are suggestive of chronic/recurrent aspiration. 2.  Hiatal hernia with diffuse mild esophageal dilatation with fluid, which may represent reflux or stasis. 3.  Emphysema. CT ABDOMEN PELVIS W IV CONTRAST Additional Contrast? None    Result Date: 11/23/2021  Moderate hiatal hernia with a fluid-filled patulous distal esophagus which is similar to prior comparison exams. Distended stomach with a normal caliber duodenum. Query possible gastroparesis or functional outlet obstruction. Possible findings of proctitis. Correlate clinically. Hepatomegaly with steatosis, unchanged. Findings in the dependent lung bases which are suspicious for sequelae of aspiration pneumonia in the appropriate clinical setting. XR CHEST PORTABLE    Result Date: 11/23/2021  Chronic lung changes with right basilar airspace opacity concerning for underlying pneumonia in the appropriate clinical setting. Correlate with aspiration risk.      CTA HEAD NECK W CONTRAST    Result Date: 11/23/2021  No large vessel occlusion visualized within the head or neck. Incidentally noted pulmonary emphysema and dilated, air and fluid-filled esophagus. Discussed with patient, RN, family. I have personally reviewed the past medical history, past surgical history, medications, social history, and family history, and I have updated the database accordingly.   Past Medical History:     Past Medical History:   Diagnosis Date    Anxiety     Arthritis     Back pain     Bronchitis     Caffeine use     8 coffee / day    Carpal tunnel syndrome     Cataracts, bilateral     Constipation     Depression     Diarrhea     GERD (gastroesophageal reflux disease)     H/O gastric ulcer     Hyperlipidemia     Hypertension     Mumps     MVP (mitral valve prolapse)     Dr. Neves Novel in May 2019    Recurrent UTI     Dr. Paulo Cabrera    Sinusitis     Tracheostomy in place [facility])     Ulcerative esophagitis     Wellness examination     Dr. Filomena Williamson seen in Jan 2020       Past Surgical  History:     Past Surgical History:   Procedure Laterality Date    APPENDECTOMY      CARPAL TUNNEL RELEASE      CHOLECYSTECTOMY, LAPAROSCOPIC N/A 05/22/2020    XI LAPAROSCOPIC ROBOTIC CHOLECYSTECTOMY, PYLOROPLASTY performed by Mary Michel [facility] at [facility] ERCP  05/21/2020    with stent insertion, balloon dilation, sphinctereotomy    ERCP  05/21/2020    ** CASE IN OR WITY GI STAFF** ERCP STENT INSERTION performed by Tammy Gonzalez MD at [facility]    ERCP  05/21/2020    ** CASE IN OR WITH GI STAFF**ERCP SPHINCTER/PAPILLOTOMY performed by Tammy Gonzalez MD at [facility]    ERCP  05/21/2020    ** CASE IN OR WITH GI STAFF**ERCP DILATION BALLOON performed by Tammy Gonzalez MD at [facility]    ERCP N/A 2/8/2021    ERCP STENT REMOVAL performed by Deann Smart MD at [facility]    ERCP  2/8/2021    ERCP STONE REMOVAL performed by Deann Smart MD at [facility]    EYE SURGERY      patricia IOL    GASTRIC FUNDOPLICATION N/A 65/42/5619    XI LAPAROSCOPIC ROBOTIC HIATAL HERNIA REPAIR, CHERYL FUNDOPLICATION performed by Aroldo Hayden MD at [address] 03/27/2020    EGD PEG TUBE PLACEMENT performed by Aroldo Hayden MD at [address] N/A 2/8/2021    **CASE IN O.R. W/ GI STAFF - EGD WITH REMOVAL PEG TUBE performed by Sade Valverde MD at [facility]    HAND SURGERY      [facility] CATH POWER PICC TRIPLE  03/04/2020         HYSTERECTOMY      Abdominal    JOINT REPLACEMENT Right     right hip    OVARY REMOVAL      RHINOPLASTY      TONSILLECTOMY      TOTAL HIP ARTHROPLASTY      TOTAL NEPHRECTOMY      atrophic from infections, age 13   Hernandez Lasso TRACHEOSTOMY N/A 03/27/2020    **ADD ON **WANTS 10:00AM **TRACHEOTOMY performed by Aroldo Hayden MD at [facility] 04/02/2020    TRACHEOTOMY EXCHANGE performed by Aroldo Hayden MD at [address] [place] [address] N/A 03/17/2020    BEDSIDE EGD ESOPHAGOGASTRODUODENOSCOPY (ICU) performed by Aroldo Hayden MD at [address] [place] [address] N/A 05/18/2020    EGD BIOPSY performed by Leona Vilchis MD at [address] [place] [address]  05/18/2020    EGD DILATION BALLOON performed by Leona Vilchis MD at [address] [place] [address] N/A 07/06/2020    ** CASE IN O.R. WITH GI STAFF** EGD ESOPHAGOGASTRODUODENOSCOPY performed by Patricia Wynn MD at [address] [place] [address] 11/09/2020    EGD DIAGNOSTIC ONLY performed by Cara Dubon MD at [address] [place] [address]  02/08/2021    - EGD WITH REMOVAL PEG TUBE,ERCP STENT REMOVAL,ERCP STONE REMOVAL    UPPER GASTROINTESTINAL ENDOSCOPY N/A 11/24/2021    EGD BIOPSY performed by Cara Dubon MD at [facility] [place]       Medications:      amitriptyline 25 mg Oral BID    busPIRone  10 mg Oral BID    escitalopram  10 mg Oral Daily    HYDROcodone 5 mg - acetaminophen  1 tablet Oral BID    pantoprazole  40 mg Oral BID AC    meropenem  1,000 mg IntraVENous Q12H    sodium chloride flush  5-40 mL IntraVENous 2 times per day    enoxaparin  30 mg SubCUTAneous Daily    budesonide-formoterol  2 puff Inhalation BID    metoclopramide  5 mg Oral TID AC    polyethylene glycol  17 g Oral Every Other Day    QUEtiapine  25 mg Oral BID    atorvastatin  40 mg Oral Daily    sucralfate  1 g Oral 4x Daily    traZODone  100 mg Oral Nightly    tamsulosin  0.4 mg Oral Daily       Social History:     Social History     Socioeconomic History    Marital status:      Spouse name: Not on file    Number of children: 2    Years of education: Not on file    Highest education level: Not on file   Occupational History    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   Tobacco Use    Smoking status: Former Smoker     Packs/day: 1.00     Years: 50.00     Pack years: 50.00     Types: Cigarettes    Smokeless tobacco: Never Used    Tobacco comment: quit 1 year ago    Vaping Use    Vaping Use: Former    Substances: Always   Substance and Sexual Activity    Alcohol use: No    Drug use: No    Sexual activity: Never   Other Topics Concern    Not on file   Social History Narrative    Not on file     Social Determinants of Health     Financial Resource Strain:     Difficulty of Paying Living Expenses: Not on file   Food Insecurity:     Worried About [address] in the Last Year: Not on file    [address] in the Last Year: Not on file   Transportation Needs:     Lack of Transportation (Medical): Not on file    Lack of Transportation (Non-Medical):  Not on file   Physical Activity:     Days of Exercise per Week: Not on file    Minutes of Exercise per Session: Not on file   Stress:     Feeling of Stress : Not on file   Social Connections:     Frequency of Communication with polyphagia. Physical Examination :     Patient Vitals for the past 8 hrs:   BP Temp Temp src Pulse Resp SpO2   11/27/21 1117 (!) 113/55 98.3 °F (36.8 °C) Oral 75 15 97 %   11/27/21 0832 -- -- -- -- 19 95 %   11/27/21 0743 106/89 98.2 °F (36.8 °C) Oral 86 14 95 %     General Appearance: Awake, alert, and in no apparent distress  Head:  Normocephalic, no trauma  Eyes: Pupils equal, round, reactive to light and accommodation; extraocular movements intact; sclera anicteric; conjunctivae pink. No embolic phenomena. ENT: Oropharynx clear, without erythema, exudate, or thrush. No tenderness of sinuses. Mouth/throat: mucosa pink and moist. No lesions. Dentition in good repair. Neck:Supple, without lymphadenopathy. Thyroid normal, No bruits. Pulmonary/Chest: Clear to auscultation, without wheezes, rales, or rhonchi. No dullness to percussion. No egophony. Cardiovascular: Regular rate and rhythm without murmurs, rubs, or gallops. Abdomen: Soft, non tender. Bowel sounds normal. No organomegaly  All four Extremities: No cyanosis, clubbing, edema, or effusions. Neurologic: No gross sensory or motor deficits. Skin: Warm and dry with good turgor. No signs of peripheral arterial or venous insufficiency. No ulcerations. No open wounds. Medical Decision Making -Laboratory:     Updated   Order   11/26/21 0978  Culture, Blood 1   Collected: 11/23/21 2040  Preliminary result  Specimen: Blood    Specimen Description . BLOOD P Culture NO GROWTH 3 DAYS P   Special Requests RT AC 20 ML P            11/26/21 0737  Culture, Blood 1   Collected: 11/23/21 2149  Preliminary result  Specimen: Blood    Specimen Description . BLOOD P Culture NO GROWTH 3 DAYS P   Special Requests 13ML R HAND P                I have independently reviewed/ordered the following labs:    CBC with Differential:   Recent Labs     11/27/21  0212   WBC 6.1   HGB 12.2   HCT 38.2        BMP:   Recent Labs     11/26/21  1637   *   K 3.6*    CO2 23   BUN 6*   CREATININE 0.73     Hepatic Function Panel:   Recent Labs     11/26/21  1637   PROT 6.1*   LABALBU 3.3*   BILITOT 0.28*   ALKPHOS 112*   ALT 16   AST 18     No results for input(s): RPR in the last 72 hours. No results for input(s): HIV in the last 72 hours. No results for input(s): BC in the last 72 hours. Lab Results   Component Value Date    MUCUS NOT REPORTED 11/23/2021    RBC 4.42 11/27/2021    TRICHOMONAS NOT REPORTED 11/23/2021    WBC 6.1 11/27/2021    YEAST NOT REPORTED 11/23/2021    TURBIDITY Clear 11/23/2021     Lab Results   Component Value Date    CREATININE 0.73 11/26/2021    GLUCOSE 98 11/26/2021       Medical Decision Making-Imaging:       Medical Decision Making-Other: Thank you for allowing us to participate in the care of this patient. Please call with questions.     Na Love MD     Pager: [phone] - Office: [phone]

## 2021-11-27 NOTE — PLAN OF CARE
Problem: Discharge Planning:  Goal: Patients continuum of care needs are met  11/27/2021 1113 by Rolando Farah RN  Outcome: Met This Shift     Problem: Pain:  Goal: Patient's pain/discomfort is manageable  11/27/2021 1113 by Rolando Farah RN  Outcome: Ongoing     Problem: Pain:  Goal: Pain level will decrease  11/27/2021 1113 by Rolando Farah RN  Outcome: Ongoing

## 2021-11-27 NOTE — PROGRESS NOTES
[facility]  Office: [address], , Author Megan, DO, Mary Mckenzie, DO, Nati Jensen, DO, Eleonora Miller MD, Richard Gomez MD, Freddy Straneg MD, Carolan Schilder, MD, Tia Maldonado MD, Solange Metz MD, Umberto Marshall MD, Ward Cruz, DO, Toshia Del Rio DO, Delgado García MD,  Marie Velasquez DO, Jasmin Macias MD, Gillian Castillo MD, Crystal Farah MD, Foster Robertson MD, Deshawn Wilde MD, Trey Joe MD, Nanci Nesbitt MD, Alexandra Mccord [facility], [facility], CNP, Easton Veronica, CNP, Kodak Castelan, CNS, Spencer Michele, CNP, Ke Edge, CNP, Celio Light, CNP, Omkar Thomas, CNP, Cleveland Robles, CNP, Faheem Mahmood PA-C, Qasim De León, [facility], Lysbeth Angelucci, CNP, Caro Vyas, CNP, Steve Lam, CNP, Chilango Blankenship, CNP, Saran Hebert, CNP, Kiera Landry, CNP, Oneida Landeros, [address] [place] [address]    Progress Note    11/27/2021    9:33 AM    Name:   Brady Carter  MRN:     [id number]     Acct:      [de-identified]   Room:   [address] [place] [address] Day:  4  Admit Date:  11/23/2021  8:12 PM    PCP:   Dinorah Angelo MD  Code Status:  Full Code    Subjective:     C/C:   Chief Complaint   Patient presents with    Abdominal Pain    Altered Mental Status     Interval History Status: not changed. Patient still complaining of dysphagia. Having hard time with her diet. Otherwise no chest pain today. No fevers or chills. She did have some chest pain yesterday which was thought to be secondary to reflux/esophagitis. Troponin downtrending. EKG without ischemic changes    Brief History:     80-year-old female past medical history of anxiety, depression, prior history of Nissen fundoplication presented with fevers chills nausea vomiting abdominal pain as well as difficulty speaking continued abdominal pain. GI and general surgery were consulted as well as infectious disease.   Patient also being followed by neurology due to concern of altered mental status. Patient underwent EGD and biopsy on 11/24/2021 showing:  Findings:   1. The esophagus was tortuous with dilated distal third. 2. The GE junction was noted at 38 cm. 3. The GE junction compliant to the passage of scope without evidence of stricture or stenosis. 4. Mild reflux esophagitis was noted at the GE junction.   5. Sarah-Abrams tear. 6. The mucosa in the fundus, cardia and proximal gastric body appeared severely congested, inflamed with diffuse erythema, and granularity.  Biopsies were performed for histology. 7. The distal gastric body antrum and pylorus appeared normal.  8. The examined duodenum appeared normal.     Recommendations:  1. Await pathology results. 2. Continue IV pantoprazole 40 mg twice daily for 2 days then change to oral once daily thereafter. 3. Start full liquid diet and advance as tolerated. 4. Patient's nausea vomiting most likely due to infectious (viral) vs medication gastritis.  This will resolve with supportive care and antiemetics. 5. Gastric distention noted on CT is most likely due to intractable nausea vomiting.  Due to recurrent nausea vomiting. 6. Continue supportive care.  Once patient is able to tolerate diet she can be discharged home. 7. Patient to follow-up with primary GI: Dr Farrah Tabor      Review of Systems:     Constitutional:  negative for chills, fevers, sweats  Respiratory:  negative for cough, dyspnea on exertion, shortness of breath, wheezing  Cardiovascular:  negative for chest pain, chest pressure/discomfort, lower extremity edema, palpitations  Gastrointestinal: Admits to abdominal pain, difficulty tolerating her diet, denies constipation, diarrhea, nausea, vomiting  Neurological:  negative for dizziness, headache    Medications: Allergies:     Allergies   Allergen Reactions    Prednisone Anxiety    Compazine [Prochlorperazine Maleate]     Penicillin V Potassium     Penicillins Other (See Comments)     Shock- received meropenem and ceftriaxone wo issues 2020       Current Meds:   Scheduled Meds:    pantoprazole  40 mg Oral BID AC    meropenem  1,000 mg IntraVENous Q12H    sodium chloride flush  5-40 mL IntraVENous 2 times per day    enoxaparin  30 mg SubCUTAneous Daily    busPIRone  20 mg Oral TID    amitriptyline  25 mg Oral Nightly    budesonide-formoterol  2 puff Inhalation BID    metoclopramide  5 mg Oral TID AC    polyethylene glycol  17 g Oral Every Other Day    QUEtiapine  25 mg Oral BID    atorvastatin  40 mg Oral Daily    sucralfate  1 g Oral 4x Daily    traZODone  100 mg Oral Nightly    tamsulosin  0.4 mg Oral Daily     Continuous Infusions:    sodium chloride      sodium chloride 50 mL/hr at 11/26/21 1521     PRN Meds: oxybutynin, calcium carbonate, ondansetron, sodium chloride flush, sodium chloride, acetaminophen **OR** acetaminophen, LORazepam, magnesium hydroxide, morphine    Data:     Past Medical History:   has a past medical history of Anxiety, Arthritis, Back pain, Bronchitis, Caffeine use, Carpal tunnel syndrome, Cataracts, bilateral, Constipation, Depression, Diarrhea, GERD (gastroesophageal reflux disease), H/O gastric ulcer, Hyperlipidemia, Hypertension, Mumps, MVP (mitral valve prolapse), Recurrent UTI, Sinusitis, Tracheostomy in place [facility]), Ulcerative esophagitis, and Wellness examination. Social History:   reports that she has quit smoking. Her smoking use included cigarettes. She has a 50.00 pack-year smoking history. She has never used smokeless tobacco. She reports that she does not drink alcohol and does not use drugs.      Family History:   Family History   Problem Relation Age of Onset    Cancer Mother         uterine    Heart Attack Mother     Heart Attack Father     Other Maternal Grandfather         foot gangrene    Other Paternal Grandmother         bleeding ulcers    Other Paternal Grandfather         lung cancer       Vitals:  /89   Pulse 86   Temp 98.2 °F (36.8 °C) (Oral)   Resp 19   Ht 5' 2\" (1.575 m)   Wt 177 lb 7.5 oz (80.5 kg)   SpO2 95%   BMI 32.46 kg/m²   Temp (24hrs), Av °F (37.2 °C), Min:97.7 °F (36.5 °C), Max:101.2 °F (38.4 °C)    No results for input(s): POCGLU in the last 72 hours. I/O (24Hr): Intake/Output Summary (Last 24 hours) at 2021 09  Last data filed at 2021 0854  Gross per 24 hour   Intake 824.72 ml   Output 2050 ml   Net -1225.28 ml       Labs:  Hematology:  Recent Labs     21   WBC 6.1   RBC 4.42   HGB 12.2   HCT 38.2   MCV 86.4   MCH 27.6   MCHC 31.9   RDW 15.7*      MPV 9.9     Chemistry:  Recent Labs     21  1637 2133   *  --   --   --    K 3.6*  --   --   --      --   --   --    CO2 23  --   --   --    GLUCOSE 98  --   --   --    BUN 6*  --   --   --    CREATININE 0.73  --   --   --    ANIONGAP 10  --   --   --    LABGLOM >60  --   --   --    GFRAA >60  --   --   --    CALCIUM 9.0  --   --   --    TROPHS  --  28* 26* 22*   MYOGLOBIN  --  44 30 31     Recent Labs     21  1637   PROT 6.1*   LABALBU 3.3*   AST 18   ALT 16   ALKPHOS 112*   BILITOT 0.28*     ABG:  Lab Results   Component Value Date    POCPH 7.437 2020    POCPCO2 42.3 2020    POCPO2 129.9 2020    POCHCO3 28.6 2020    NBEA NOT REPORTED 2020    PBEA 4 2020    YGW2KQA 30 2020    TLFB6FAQ 99 2020    FIO2 NOT REPORTED 2021     Lab Results   Component Value Date/Time    SPECIAL NOT REPORTED 2021 10:12 PM     Lab Results   Component Value Date/Time    CULTURE NO GROWTH 2021 10:12 PM       Radiology:  CT Head WO Contrast    Result Date: 2021  No CT evidence of an acute infarct. The findings were sent to the Radiology Results [address] at 8:54 pm on 2021to be communicated to a licensed caregiver. CT CHEST WO CONTRAST    Result Date: 2021  1.   Basilar opacities, right greater than left, with central airway congestion and filling of the peripheral subsegmental bronchi in the right lower lobe. Overall these features are suggestive of chronic/recurrent aspiration. 2.  Hiatal hernia with diffuse mild esophageal dilatation with fluid, which may represent reflux or stasis. 3.  Emphysema. CT ABDOMEN PELVIS W IV CONTRAST Additional Contrast? None    Result Date: 11/23/2021  Moderate hiatal hernia with a fluid-filled patulous distal esophagus which is similar to prior comparison exams. Distended stomach with a normal caliber duodenum. Query possible gastroparesis or functional outlet obstruction. Possible findings of proctitis. Correlate clinically. Hepatomegaly with steatosis, unchanged. Findings in the dependent lung bases which are suspicious for sequelae of aspiration pneumonia in the appropriate clinical setting. XR CHEST PORTABLE    Result Date: 11/23/2021  Chronic lung changes with right basilar airspace opacity concerning for underlying pneumonia in the appropriate clinical setting. Correlate with aspiration risk. CTA HEAD NECK W CONTRAST    Result Date: 11/23/2021  No large vessel occlusion visualized within the head or neck. Incidentally noted pulmonary emphysema and dilated, air and fluid-filled esophagus. MRI BRAIN WO CONTRAST    Result Date: 11/24/2021  1. No acute intracranial abnormality, specifically no evidence of acute infarct. 2. Minimal microvascular ischemic disease. FL MODIFIED BARIUM SWALLOW W VIDEO    Result Date: 11/26/2021  No laryngeal penetration or aspiration. Please see separate speech pathology report for full discussion of findings and recommendations.        Physical Examination:        General appearance:  alert, cooperative and no distress  Mental Status:  oriented to person, place and time and normal affect  Lungs:  clear to auscultation bilaterally, normal effort  Heart:  regular rate and rhythm, no murmur  Abdomen:  soft, nontender, nondistended, normal bowel sounds, no masses, hepatomegaly, splenomegaly  Extremities:  no edema, redness, tenderness in the calves  Skin:  no gross lesions, rashes, induration    Assessment:        Hospital Problems           Last Modified POA    * (Principal) Expressive aphasia 11/24/2021 Yes    Hiatal hernia with GERD and esophagitis 11/25/2021 Yes    Depression 11/24/2021 Yes    Fever 11/24/2021 Yes    Gastric outlet obstruction 11/24/2021 Yes    Hypertension 11/24/2021 Yes    S/P Nissen fundoplication (without gastrostomy tube) procedure 11/24/2021 Yes          Plan:        1. Esophagitis with josefa-abrams tear, gastritis s/p EGD 11/24. Add Viscous lidocaine. Advance diet as tolerated. If not improved and patient unable to tolerate diet, will reach out to GI to see if pt will need PEG. 2. Toxic metabolic encephalopathy 2/2 Intravascular volume depleation. Continue IVF since pt is not tolerating oral intake at this time. 3. Expressive aphasia: Resolved. Neurology consulted thought to be 2/2 above. MRI brain showed no acute process. Recommend outpt neurology evaluation for EEG. 4. Hyponatremia: check morning labs, urine osm, urine sodium. 5. Hypokalemia: replace potssium  6. Recurrent aspiration pneumonia: ID following, continue merropenem then will switch to PO prior to discharge. 7. Distal esophageal dilatation with stasis and severely congested, inflamed mucosa: EGD biopsy results pending. GI recommends protonix 40 mg PO daily. Recommend outpatient GI evaluation with . 8. History of MDRO, ESBL + bacteria  9. Depression: continue Psych medications  10. History of Nissen fundoplication: Continue PPI  11.  Josefa-Abrams tear: Continue PPI, antiemetics    Eula Macias,   11/27/2021  9:33 AM

## 2021-11-28 LAB
ABSOLUTE EOS #: 0.08 K/UL (ref 0–0.4)
ABSOLUTE IMMATURE GRANULOCYTE: 0 K/UL (ref 0–0.3)
ABSOLUTE LYMPH #: 0.43 K/UL (ref 1–4.8)
ABSOLUTE MONO #: 0.12 K/UL (ref 0.1–0.8)
ALBUMIN SERPL-MCNC: 3.4 G/DL (ref 3.5–5.2)
ANION GAP SERPL CALCULATED.3IONS-SCNC: 9 MMOL/L (ref 9–17)
BASOPHILS # BLD: 0 % (ref 0–2)
BASOPHILS ABSOLUTE: 0 K/UL (ref 0–0.2)
BUN BLDV-MCNC: 6 MG/DL (ref 8–23)
BUN/CREAT BLD: ABNORMAL (ref 9–20)
CALCIUM SERPL-MCNC: 9 MG/DL (ref 8.6–10.4)
CHLORIDE BLD-SCNC: 100 MMOL/L (ref 98–107)
CO2: 25 MMOL/L (ref 20–31)
CREAT SERPL-MCNC: 0.69 MG/DL (ref 0.5–0.9)
DIFFERENTIAL TYPE: ABNORMAL
EOSINOPHILS RELATIVE PERCENT: 2 % (ref 1–4)
GFR AFRICAN AMERICAN: >60 ML/MIN
GFR NON-AFRICAN AMERICAN: >60 ML/MIN
GFR SERPL CREATININE-BSD FRML MDRD: ABNORMAL ML/MIN/{1.73_M2}
GFR SERPL CREATININE-BSD FRML MDRD: ABNORMAL ML/MIN/{1.73_M2}
GLUCOSE BLD-MCNC: 107 MG/DL (ref 70–99)
HCT VFR BLD CALC: 38.9 % (ref 36.3–47.1)
HEMOGLOBIN: 12.4 G/DL (ref 11.9–15.1)
IMMATURE GRANULOCYTES: 0 %
LYMPHOCYTES # BLD: 11 % (ref 24–44)
MAGNESIUM: 1.8 MG/DL (ref 1.6–2.6)
MCH RBC QN AUTO: 28.1 PG (ref 25.2–33.5)
MCHC RBC AUTO-ENTMCNC: 31.9 G/DL (ref 28.4–34.8)
MCV RBC AUTO: 88 FL (ref 82.6–102.9)
MONOCYTES # BLD: 3 % (ref 1–7)
MORPHOLOGY: ABNORMAL
NRBC AUTOMATED: 0 PER 100 WBC
PDW BLD-RTO: 15.7 % (ref 11.8–14.4)
PHOSPHORUS: 2.5 MG/DL (ref 2.6–4.5)
PLATELET # BLD: 189 K/UL (ref 138–453)
PLATELET ESTIMATE: ABNORMAL
PMV BLD AUTO: 10.3 FL (ref 8.1–13.5)
POTASSIUM SERPL-SCNC: 4 MMOL/L (ref 3.7–5.3)
RBC # BLD: 4.42 M/UL (ref 3.95–5.11)
RBC # BLD: ABNORMAL 10*6/UL
SEG NEUTROPHILS: 84 % (ref 36–66)
SEGMENTED NEUTROPHILS ABSOLUTE COUNT: 3.27 K/UL (ref 1.8–7.7)
SODIUM BLD-SCNC: 134 MMOL/L (ref 135–144)
WBC # BLD: 3.9 K/UL (ref 3.5–11.3)
WBC # BLD: ABNORMAL 10*3/UL

## 2021-11-28 PROCEDURE — 6370000000 HC RX 637 (ALT 250 FOR IP): Performed by: INTERNAL MEDICINE

## 2021-11-28 PROCEDURE — 6360000002 HC RX W HCPCS: Performed by: NURSE PRACTITIONER

## 2021-11-28 PROCEDURE — 6360000002 HC RX W HCPCS: Performed by: INTERNAL MEDICINE

## 2021-11-28 PROCEDURE — 99232 SBSQ HOSP IP/OBS MODERATE 35: CPT | Performed by: INTERNAL MEDICINE

## 2021-11-28 PROCEDURE — 2580000003 HC RX 258: Performed by: STUDENT IN AN ORGANIZED HEALTH CARE EDUCATION/TRAINING PROGRAM

## 2021-11-28 PROCEDURE — 2700000000 HC OXYGEN THERAPY PER DAY

## 2021-11-28 PROCEDURE — 6370000000 HC RX 637 (ALT 250 FOR IP): Performed by: STUDENT IN AN ORGANIZED HEALTH CARE EDUCATION/TRAINING PROGRAM

## 2021-11-28 PROCEDURE — 6370000000 HC RX 637 (ALT 250 FOR IP): Performed by: NURSE PRACTITIONER

## 2021-11-28 PROCEDURE — 36415 COLL VENOUS BLD VENIPUNCTURE: CPT

## 2021-11-28 PROCEDURE — 83735 ASSAY OF MAGNESIUM: CPT

## 2021-11-28 PROCEDURE — 2580000003 HC RX 258: Performed by: NURSE PRACTITIONER

## 2021-11-28 PROCEDURE — 85025 COMPLETE CBC W/AUTO DIFF WBC: CPT

## 2021-11-28 PROCEDURE — 80069 RENAL FUNCTION PANEL: CPT

## 2021-11-28 PROCEDURE — 2060000000 HC ICU INTERMEDIATE R&B

## 2021-11-28 PROCEDURE — 94640 AIRWAY INHALATION TREATMENT: CPT

## 2021-11-28 RX ORDER — GUAIFENESIN DEXTROMETHORPHAN HYDROBROMIDE ORAL SOLUTION 10; 100 MG/5ML; MG/5ML
10 SOLUTION ORAL EVERY 4 HOURS PRN
Status: DISCONTINUED | OUTPATIENT
Start: 2021-11-28 | End: 2021-11-30 | Stop reason: HOSPADM

## 2021-11-28 RX ORDER — LIDOCAINE HYDROCHLORIDE 20 MG/ML
15 SOLUTION OROPHARYNGEAL
Status: DISCONTINUED | OUTPATIENT
Start: 2021-11-28 | End: 2021-11-30 | Stop reason: HOSPADM

## 2021-11-28 RX ADMIN — LIDOCAINE HYDROCHLORIDE 15 ML: 20 SOLUTION ORAL; TOPICAL at 16:13

## 2021-11-28 RX ADMIN — LIDOCAINE HYDROCHLORIDE 15 ML: 20 SOLUTION ORAL; TOPICAL at 09:52

## 2021-11-28 RX ADMIN — SODIUM CHLORIDE: 9 INJECTION, SOLUTION INTRAVENOUS at 02:14

## 2021-11-28 RX ADMIN — ONDANSETRON 4 MG: 2 INJECTION INTRAMUSCULAR; INTRAVENOUS at 17:39

## 2021-11-28 RX ADMIN — BUSPIRONE HYDROCHLORIDE 10 MG: 10 TABLET ORAL at 20:45

## 2021-11-28 RX ADMIN — LORAZEPAM 1 MG: 0.5 TABLET ORAL at 10:39

## 2021-11-28 RX ADMIN — MEROPENEM 1000 MG: 1 INJECTION, POWDER, FOR SOLUTION INTRAVENOUS at 23:00

## 2021-11-28 RX ADMIN — SUCRALFATE 1 G: 1 TABLET ORAL at 12:36

## 2021-11-28 RX ADMIN — AMITRIPTYLINE HYDROCHLORIDE 25 MG: 25 TABLET, FILM COATED ORAL at 09:04

## 2021-11-28 RX ADMIN — BUDESONIDE AND FORMOTEROL FUMARATE DIHYDRATE 2 PUFF: 160; 4.5 AEROSOL RESPIRATORY (INHALATION) at 20:50

## 2021-11-28 RX ADMIN — TAMSULOSIN HYDROCHLORIDE 0.4 MG: 0.4 CAPSULE ORAL at 09:06

## 2021-11-28 RX ADMIN — PANTOPRAZOLE SODIUM 40 MG: 40 TABLET, DELAYED RELEASE ORAL at 06:40

## 2021-11-28 RX ADMIN — METOCLOPRAMIDE 5 MG: 5 TABLET ORAL at 16:13

## 2021-11-28 RX ADMIN — MORPHINE SULFATE 2 MG: 4 INJECTION, SOLUTION INTRAMUSCULAR; INTRAVENOUS at 10:39

## 2021-11-28 RX ADMIN — ANTACID TABLETS 1000 MG: 500 TABLET, CHEWABLE ORAL at 09:52

## 2021-11-28 RX ADMIN — SUCRALFATE 1 G: 1 TABLET ORAL at 09:04

## 2021-11-28 RX ADMIN — QUETIAPINE FUMARATE 25 MG: 25 TABLET ORAL at 20:45

## 2021-11-28 RX ADMIN — SODIUM CHLORIDE, PRESERVATIVE FREE 5 ML: 5 INJECTION INTRAVENOUS at 09:02

## 2021-11-28 RX ADMIN — HYDROCODONE BITARTRATE AND ACETAMINOPHEN 1 TABLET: 5; 325 TABLET ORAL at 09:09

## 2021-11-28 RX ADMIN — ATORVASTATIN CALCIUM 40 MG: 80 TABLET, FILM COATED ORAL at 09:06

## 2021-11-28 RX ADMIN — AMITRIPTYLINE HYDROCHLORIDE 25 MG: 25 TABLET, FILM COATED ORAL at 20:45

## 2021-11-28 RX ADMIN — MORPHINE SULFATE 2 MG: 4 INJECTION, SOLUTION INTRAMUSCULAR; INTRAVENOUS at 17:39

## 2021-11-28 RX ADMIN — ENOXAPARIN SODIUM 30 MG: 100 INJECTION SUBCUTANEOUS at 09:07

## 2021-11-28 RX ADMIN — METOCLOPRAMIDE 5 MG: 5 TABLET ORAL at 06:40

## 2021-11-28 RX ADMIN — ESCITALOPRAM OXALATE 10 MG: 10 TABLET ORAL at 09:08

## 2021-11-28 RX ADMIN — TRAZODONE HYDROCHLORIDE 100 MG: 100 TABLET ORAL at 20:45

## 2021-11-28 RX ADMIN — HYDROCODONE BITARTRATE AND ACETAMINOPHEN 1 TABLET: 5; 325 TABLET ORAL at 20:45

## 2021-11-28 RX ADMIN — PANTOPRAZOLE SODIUM 40 MG: 40 TABLET, DELAYED RELEASE ORAL at 16:13

## 2021-11-28 RX ADMIN — MEROPENEM 1000 MG: 1 INJECTION, POWDER, FOR SOLUTION INTRAVENOUS at 10:42

## 2021-11-28 RX ADMIN — QUETIAPINE FUMARATE 25 MG: 25 TABLET ORAL at 09:04

## 2021-11-28 RX ADMIN — BUSPIRONE HYDROCHLORIDE 10 MG: 10 TABLET ORAL at 09:06

## 2021-11-28 RX ADMIN — METOCLOPRAMIDE 5 MG: 5 TABLET ORAL at 09:52

## 2021-11-28 RX ADMIN — LORAZEPAM 1 MG: 0.5 TABLET ORAL at 17:07

## 2021-11-28 RX ADMIN — BUDESONIDE AND FORMOTEROL FUMARATE DIHYDRATE 2 PUFF: 160; 4.5 AEROSOL RESPIRATORY (INHALATION) at 07:48

## 2021-11-28 RX ADMIN — LACTULOSE 20 G: 20 SOLUTION ORAL at 09:07

## 2021-11-28 RX ADMIN — SODIUM CHLORIDE, PRESERVATIVE FREE 10 ML: 5 INJECTION INTRAVENOUS at 20:46

## 2021-11-28 RX ADMIN — SUCRALFATE 1 G: 1 TABLET ORAL at 20:45

## 2021-11-28 RX ADMIN — SUCRALFATE 1 G: 1 TABLET ORAL at 16:14

## 2021-11-28 ASSESSMENT — PAIN SCALES - GENERAL
PAINLEVEL_OUTOF10: 10
PAINLEVEL_OUTOF10: 8
PAINLEVEL_OUTOF10: 9
PAINLEVEL_OUTOF10: 0
PAINLEVEL_OUTOF10: 5

## 2021-11-28 NOTE — PROGRESS NOTES
[facility]  Office: [address], DO, Arthur Ringwood, DO, Jason Solitario, DO, Nabil Butler Blood, DO, Hamilton Moulton MD, Erik Allison MD, Ricky Roberts MD, Mary Hdz MD, Otis Wagner MD, Joseline Stout MD, Angelique Flores MD, Nate Casanova, DO, Dc Harrington, DO, Celio Serrano MD,  Emily Chapa, DO, Josue Webb MD, Norman Gilman MD, Kimberly Valdez MD, Paolo Freeman MD, Kelley Nunn MD, Orestes Daley MD, Tay Dumas MD, Buzz Funes [facility], [facility], CNP, Lisa Wasserman, CNP, Abigail Lancaster, CNS, Joyce Ryan, CNP, Dionte Loaiza, CNP, Beverley Kumari, CNP, Robb Hernandez, CNP, Zoraida Jacobs, CNP, Jojo Esquivel PA-C, Charmel Osgood, [facility], Quynh Escamilla, CNP, Celina Guerra, CNP, Kelli Chapa, CNP, Tiney Schilder, CNP, Promise Melchor, CNP, Marlyne Osgood, CNP, Ge Willard, [address] [place] [address]    Progress Note    11/28/2021    12:12 PM    Name:   Francis Hinton  MRN:     [id number]     Acct:      [de-identified]   Room:   [address] [place] [address] Day:  5  Admit Date:  11/23/2021  8:12 PM    PCP:   Geeta Nickerson MD  Code Status:  Full Code    Subjective:     C/C:   Chief Complaint   Patient presents with    Abdominal Pain    Altered Mental Status     Interval History Status: not changed. Patient looking better today. She is tolerating her diet better. Denies any fevers, chills. Still having pain when swallowing but denies any chest pain. Brief History:     51-year-old female past medical history of anxiety, depression, prior history of Nissen fundoplication presented with fevers chills nausea vomiting abdominal pain as well as difficulty speaking continued abdominal pain. GI and general surgery were consulted as well as infectious disease. Patient also being followed by neurology due to concern of altered mental status.   Patient underwent EGD and biopsy on 11/24/2021 showing:  Findings:   1. The esophagus was tortuous with dilated distal third. 2. The GE junction was noted at 38 cm. 3. The GE junction compliant to the passage of scope without evidence of stricture or stenosis. 4. Mild reflux esophagitis was noted at the GE junction.   5. Sarah-Abrams tear. 6. The mucosa in the fundus, cardia and proximal gastric body appeared severely congested, inflamed with diffuse erythema, and granularity.  Biopsies were performed for histology. 7. The distal gastric body antrum and pylorus appeared normal.  8. The examined duodenum appeared normal.     Recommendations:  1. Await pathology results. 2. Continue IV pantoprazole 40 mg twice daily for 2 days then change to oral once daily thereafter. 3. Start full liquid diet and advance as tolerated. 4. Patient's nausea vomiting most likely due to infectious (viral) vs medication gastritis.  This will resolve with supportive care and antiemetics. 5. Gastric distention noted on CT is most likely due to intractable nausea vomiting.  Due to recurrent nausea vomiting. 6. Continue supportive care.  Once patient is able to tolerate diet she can be discharged home. 7. Patient to follow-up with primary GI: Dr Jlil Cat      Review of Systems:     Constitutional:  negative for chills, fevers, sweats  Respiratory:  negative for cough, dyspnea on exertion, shortness of breath, wheezing  Cardiovascular:  negative for chest pain, chest pressure/discomfort, lower extremity edema, palpitations  Gastrointestinal: Admits to abdominal pain, difficulty tolerating her diet, denies constipation, diarrhea, nausea, vomiting  Neurological:  negative for dizziness, headache    Medications: Allergies:     Allergies   Allergen Reactions    Prednisone Anxiety    Compazine [Prochlorperazine Maleate]     Penicillin V Potassium     Penicillins Other (See Comments)     Shock- received meropenem and ceftriaxone wo issues 2020       Current Meds:   Scheduled Meds:    lidocaine viscous hcl  15 mL Mouth/Throat TID AC    amitriptyline  25 mg Oral BID    busPIRone  10 mg Oral BID    escitalopram  10 mg Oral Daily    HYDROcodone 5 mg - acetaminophen  1 tablet Oral BID    pantoprazole  40 mg Oral BID AC    meropenem  1,000 mg IntraVENous Q12H    sodium chloride flush  5-40 mL IntraVENous 2 times per day    enoxaparin  30 mg SubCUTAneous Daily    budesonide-formoterol  2 puff Inhalation BID    metoclopramide  5 mg Oral TID AC    polyethylene glycol  17 g Oral Every Other Day    QUEtiapine  25 mg Oral BID    atorvastatin  40 mg Oral Daily    sucralfate  1 g Oral 4x Daily    traZODone  100 mg Oral Nightly    tamsulosin  0.4 mg Oral Daily     Continuous Infusions:    sodium chloride       PRN Meds: dextromethorphan-guaiFENesin, oxybutynin, calcium carbonate, ondansetron, sodium chloride flush, sodium chloride, LORazepam, magnesium hydroxide, morphine    Data:     Past Medical History:   has a past medical history of Anxiety, Arthritis, Back pain, Bronchitis, Caffeine use, Carpal tunnel syndrome, Cataracts, bilateral, Constipation, Depression, Diarrhea, GERD (gastroesophageal reflux disease), H/O gastric ulcer, Hyperlipidemia, Hypertension, Mumps, MVP (mitral valve prolapse), Recurrent UTI, Sinusitis, Tracheostomy in place [facility]), Ulcerative esophagitis, and Wellness examination. Social History:   reports that she has quit smoking. Her smoking use included cigarettes. She has a 50.00 pack-year smoking history. She has never used smokeless tobacco. She reports that she does not drink alcohol and does not use drugs.      Family History:   Family History   Problem Relation Age of Onset   Verl Raw Cancer Mother         uterine    Heart Attack Mother     Heart Attack Father     Other Maternal Grandfather         foot gangrene    Other Paternal Grandmother         bleeding ulcers    Other Paternal Grandfather         lung cancer       Vitals:  /63   Pulse 88   Temp 98.4 °F (36.9 °C) (Oral)   Resp 16 Ht 5' 2\" (1.575 m)   Wt 179 lb 0.2 oz (81.2 kg)   SpO2 99%   BMI 32.74 kg/m²   Temp (24hrs), Av.2 °F (36.8 °C), Min:98 °F (36.7 °C), Max:98.4 °F (36.9 °C)    No results for input(s): POCGLU in the last 72 hours. I/O (24Hr):     Intake/Output Summary (Last 24 hours) at 2021 1212  Last data filed at 2021 0600  Gross per 24 hour   Intake 2228.01 ml   Output 1200 ml   Net 1028.01 ml       Labs:  Hematology:  Recent Labs     21  0837   WBC 6.1 4.8 3.9   RBC 4.42 4.13 4.42   HGB 12.2 11.5* 12.4   HCT 38.2 37.6 38.9   MCV 86.4 91.0 88.0   MCH 27.6 27.8 28.1   MCHC 31.9 30.6 31.9   RDW 15.7* 15.7* 15.7*    177 189   MPV 9.9 9.9 10.3     Chemistry:  Recent Labs     21  1637 21  0833 21  0837   *  --   --   --  135 134*   K 3.6*  --   --   --  3.8 4.0     --   --   --  105 100   CO2 23  --   --   --  17* 25   GLUCOSE 98  --   --   --  104* 107*   BUN 6*  --   --   --  7* 6*   CREATININE 0.73  --   --   --  0.76 0.69   MG  --   --   --   --  2.1 1.8   ANIONGAP 10  --   --   --  13 9   LABGLOM >60  --   --   --  >60 >60   GFRAA >60  --   --   --  >60 >60   CALCIUM 9.0  --   --   --  9.0 9.0   PHOS  --   --   --   --  2.7 2.5*   TROPHS  --  28* 26* 22*  --   --    MYOGLOBIN  --  44 30 31  --   --      Recent Labs     21  1637 21  1802 21  0837   PROT 6.1*  --   --    LABALBU 3.3* 2.9* 3.4*   AST 18  --   --    ALT 16  --   --    ALKPHOS 112*  --   --    BILITOT 0.28*  --   --      ABG:  Lab Results   Component Value Date    POCPH 7.437 2020    POCPCO2 42.3 2020    POCPO2 129.9 2020    POCHCO3 28.6 2020    NBEA NOT REPORTED 2020    PBEA 4 2020    IOQ9LYP 30 2020    RUVE6VOK 99 2020    FIO2 NOT REPORTED 2021     Lab Results   Component Value Date/Time    SPECIAL NOT REPORTED 2021 10:12 PM     Lab Results Component Value Date/Time    CULTURE NO GROWTH 11/23/2021 10:12 PM       Radiology:  CT Head WO Contrast    Result Date: 11/23/2021  No CT evidence of an acute infarct. The findings were sent to the Radiology Results [address] at 8:54 pm on 11/23/2021to be communicated to a licensed caregiver. CT CHEST WO CONTRAST    Result Date: 11/24/2021  1. Basilar opacities, right greater than left, with central airway congestion and filling of the peripheral subsegmental bronchi in the right lower lobe. Overall these features are suggestive of chronic/recurrent aspiration. 2.  Hiatal hernia with diffuse mild esophageal dilatation with fluid, which may represent reflux or stasis. 3.  Emphysema. CT ABDOMEN PELVIS W IV CONTRAST Additional Contrast? None    Result Date: 11/23/2021  Moderate hiatal hernia with a fluid-filled patulous distal esophagus which is similar to prior comparison exams. Distended stomach with a normal caliber duodenum. Query possible gastroparesis or functional outlet obstruction. Possible findings of proctitis. Correlate clinically. Hepatomegaly with steatosis, unchanged. Findings in the dependent lung bases which are suspicious for sequelae of aspiration pneumonia in the appropriate clinical setting. XR CHEST PORTABLE    Result Date: 11/23/2021  Chronic lung changes with right basilar airspace opacity concerning for underlying pneumonia in the appropriate clinical setting. Correlate with aspiration risk. CTA HEAD NECK W CONTRAST    Result Date: 11/23/2021  No large vessel occlusion visualized within the head or neck. Incidentally noted pulmonary emphysema and dilated, air and fluid-filled esophagus. MRI BRAIN WO CONTRAST    Result Date: 11/24/2021  1. No acute intracranial abnormality, specifically no evidence of acute infarct. 2. Minimal microvascular ischemic disease.      FL MODIFIED BARIUM SWALLOW W VIDEO    Result Date: 11/26/2021  No laryngeal penetration or aspiration. Please see separate speech pathology report for full discussion of findings and recommendations. Physical Examination:        General appearance:  alert, cooperative and no distress  Mental Status:  oriented to person, place and time and normal affect  Lungs:  clear to auscultation bilaterally, normal effort  Heart:  regular rate and rhythm, no murmur  Abdomen:  soft, nontender, nondistended, normal bowel sounds, no masses, hepatomegaly, splenomegaly  Extremities:  no edema, redness, tenderness in the calves  Skin:  no gross lesions, rashes, induration    Assessment:        Hospital Problems           Last Modified POA    * (Principal) Expressive aphasia 11/24/2021 Yes    Hiatal hernia with GERD and esophagitis 11/25/2021 Yes    Depression 11/24/2021 Yes    Fever 11/24/2021 Yes    Gastric outlet obstruction 11/24/2021 Yes    Hypertension 11/24/2021 Yes    S/P Nissen fundoplication (without gastrostomy tube) procedure 11/24/2021 Yes          Plan:        1. Esophagitis with josefa-conway tear, gastritis s/p EGD 11/24. Schedule Viscous lidocaine. Advance diet as tolerated-she is doing better today  2. Toxic metabolic encephalopathy 2/2 Intravascular volume depleation. Stop IVF since pt is tolerating oral intake at this time. 3. Expressive aphasia: Resolved. Neurology consulted thought to be 2/2 above. MRI brain showed no acute process. Recommend outpt neurology evaluation for EEG. 4. Hyponatremia: stable. check morning labs, urine osm, urine sodium. 5. Hypokalemia: replace potssium  6. Recurrent aspiration pneumonia: ID following, continue merropenem then will switch to PO prior to discharge. 7. Distal esophageal dilatation with stasis and severely congested, inflamed mucosa: EGD biopsy results pending. GI recommends protonix 40 mg PO daily. Recommend outpatient GI evaluation with . 8. History of MDRO, ESBL + bacteria  9. Depression: continue Psych medications  10.  History of Nissen fundoplication: Continue PPI  11. Sarah-Abrams tear: Continue PPI, antiemetics      Dispo: will discharge in 24 hours if she continues tolerating her diet.  Pt also needs IV abx switched to [address],   11/28/2021  12:12 PM

## 2021-11-28 NOTE — PLAN OF CARE
Problem: Falls - Risk of:  Goal: Will remain free from falls  Description: Will remain free from falls  11/28/2021 0231 by Reji Green RN  Outcome: Ongoing    Goal: Absence of physical injury  Description: Absence of physical injury  11/28/2021 0231 by Reji Green RN  Outcome: Ongoing       Problem: Skin Integrity:  Goal: Will show no infection signs and symptoms  Description: Will show no infection signs and symptoms  11/28/2021 0231 by Reji Green RN  Outcome: Ongoing    Goal: Absence of new skin breakdown  Description: Absence of new skin breakdown  11/28/2021 0231 by Reji Green RN  Outcome: Ongoing       Problem: Anxiety:  Goal: Level of anxiety will decrease  Description: Level of anxiety will decrease  11/28/2021 0231 by Reji Green RN  Outcome: Ongoing       Problem: Infection:  Goal: Will remain free from infection  Description: Will remain free from infection  11/28/2021 0231 by Reji Green RN  Outcome: Ongoing       Problem: Safety:  Goal: Free from accidental physical injury  Description: Free from accidental physical injury  11/28/2021 0231 by Reji Green RN  Outcome: Ongoing    Goal: Free from intentional harm  Description: Free from intentional harm  11/28/2021 0231 by Reji Green RN  Outcome: Ongoing       Problem: Daily Care:  Goal: Daily care needs are met  Description: Daily care needs are met  11/28/2021 0231 by Reji Green RN  Outcome: Ongoing       Problem: Pain:  Goal: Patient's pain/discomfort is manageable  Description: Patient's pain/discomfort is manageable  Outcome: Ongoing  Goal: Pain level will decrease  Description: Pain level will decrease  Outcome: Ongoing  Goal: Control of acute pain  Description: Control of acute pain  Outcome: Ongoing  Goal: Control of chronic pain  Description: Control of chronic pain  Outcome: Ongoing     Problem: Skin Integrity:  Goal: Skin integrity will stabilize  Description: Skin integrity will

## 2021-11-28 NOTE — PROGRESS NOTES
[facility] - Progress Note    Today's Date and Time: 11/28/2021, 1:38 PM    Impression :   · Recurrent Aspiration pneumonia  · Hiatal hernia  · Distal esophageal dilatation with stasis  · Anxiety  · Hx of MDRO, ESBL + bacteria    Recommendations:   · Continue meropenem. Stop date 11-30-21    Medical Decision Making/Summary/Discussion:   ·   Infection Control Recommendations   · [place] Precautions  · Contact Isolation Hx Klebsiella ESBL +    Antimicrobial Stewardship Recommendations     · Simplification of therapy  · Targeted therapy    Coordination of Outpatient Care:   · Estimated Length of IV antimicrobials:TBD  · Patient will need Midline Catheter Insertion: no  · Patient will need PICC line Insertion:No  · Patient will need: Home IV , [place],  SNF,  LTAC: TBD  · Patient will need outpatient wound care:No    Chief complaint/reason for consultation:   · Aspiration pneumonia      History of Present Illness:   Yazmin Castro is a 68y.o.-year-old  female who was initially admitted on 11/23/2021. Patient seen at the request of Dr. Kory Mendoza    [facility] of anxiety/depression    INITIAL HISTORY:    Patient  presented with a fever, T 103, and 3 weeks of nausea/vomiting and abdominal pain, constipation x 5 days, expressive aphasia. Denied any headache or weakness in her upper or lower extremities. At the ED, she was Saturating on room air,   Pertinent labs include Cr 1.18, lactic acid 2.3, wbc 8.9,     CT chest concerning for chronic recurrent aspiration pneumonia. Hiatal hernia, distal esophageal distension with stasis of refluxed fluid. ID consulted      CURRENT EVALUATION: 11/28/21     Continue meropenem until 11-30-21. Patient underwent EGD with findings of:  · Tortuous esophagus with dilated distal third. · Evidence of prior fundoplication which appeared to be partially undone  · Sarah-Abrams tear.   · Severely congested and inflamed appearing proximal stomach  · Normal distal stomach  · Normal examined duodenum     Afebrile  VS stable    On room air  RR 14-->19-->15  02 sat 94-->96    Patient feels better. Reports chronic sputum production with yellowish phlegm  Reports epigastric pain. Overall looks better    Labs: 11/28/2021      BUN:13-->6  Creatinine: 1.21-->0.9    WBC: 7.9-->3.9  Hgb: 12.4  Platelet: 818-->200      Lactic acid 2.3-->0.9    Cultures:  Urine:  · 11/23/2021: No growth  Blood:  · 11/23/2021 x 2 No growth  · 11/26/21: x 2 no growth   Sputum :  ·   Wound:     CSF     CT chest 11/24/2021    CT Head WO Contrast    Result Date: 11/23/2021  No CT evidence of an acute infarct. The findings were sent to the Radiology Results [address] at 8:54 pm on 11/23/2021to be communicated to a licensed caregiver. CT CHEST WO CONTRAST    Result Date: 11/24/2021  1. Basilar opacities, right greater than left, with central airway congestion and filling of the peripheral subsegmental bronchi in the right lower lobe. Overall these features are suggestive of chronic/recurrent aspiration. 2.  Hiatal hernia with diffuse mild esophageal dilatation with fluid, which may represent reflux or stasis. 3.  Emphysema. CT ABDOMEN PELVIS W IV CONTRAST Additional Contrast? None    Result Date: 11/23/2021  Moderate hiatal hernia with a fluid-filled patulous distal esophagus which is similar to prior comparison exams. Distended stomach with a normal caliber duodenum. Query possible gastroparesis or functional outlet obstruction. Possible findings of proctitis. Correlate clinically. Hepatomegaly with steatosis, unchanged. Findings in the dependent lung bases which are suspicious for sequelae of aspiration pneumonia in the appropriate clinical setting. XR CHEST PORTABLE    Result Date: 11/23/2021  Chronic lung changes with right basilar airspace opacity concerning for underlying pneumonia in the appropriate clinical setting.   Correlate with aspiration risk.     CTA HEAD NECK W CONTRAST    Result Date: 11/23/2021  No large vessel occlusion visualized within the head or neck. Incidentally noted pulmonary emphysema and dilated, air and fluid-filled esophagus. Discussed with patient, RN, family. I have personally reviewed the past medical history, past surgical history, medications, social history, and family history, and I have updated the database accordingly.   Past Medical History:     Past Medical History:   Diagnosis Date    Anxiety     Arthritis     Back pain     Bronchitis     Caffeine use     8 coffee / day    Carpal tunnel syndrome     Cataracts, bilateral     Constipation     Depression     Diarrhea     GERD (gastroesophageal reflux disease)     H/O gastric ulcer     Hyperlipidemia     Hypertension     Mumps     MVP (mitral valve prolapse)     Dr. Hortencia Belle in May 2019    Recurrent UTI     Dr. Shavonne Cruz    Sinusitis     Trache[facility] in place [facility])     Ulcerative esophagitis     Wellness examination     Dr. Joel Morse seen in Jan 2020       Past Surgical  History:     Past Surgical History:   Procedure Laterality Date    APPENDECTOMY      CARPAL TUNNEL RELEASE      CHOLECYSTECTOMY, LAPAROSCOPIC N/A 05/22/2020    XI LAPAROSCOPIC ROBOTIC CHOLECYSTECTOMY, PYLOROPLASTY performed by Etta Roland MD at [facility] ERCP  05/21/2020    with stent insertion, balloon dilation, sphinctereotomy    ERCP  05/21/2020    ** CASE IN OR WITY GI STAFF** ERCP STENT INSERTION performed by Belle Hernandez MD at [facility]    ERCP  05/21/2020    ** CASE IN OR WITH GI STAFF**ERCP SPHINCTER/PAPILLOTOMY performed by Belle Hernandez MD at [facility]    ERCP  05/21/2020    ** CASE IN OR WITH GI STAFF**ERCP DILATION BALLOON performed by Belle Hernandez MD at [facility]    ERCP N/A 2/8/2021    ERCP STENT REMOVAL performed by Vivian Sykes MD at [facility]    ERCP  2/8/2021    ERCP STONE REMOVAL performed by Jose Angel Noel MD at [address]      patricia IOL    GASTRIC FUNDOPLICATION N/A 73/27/9034    XI LAPAROSCOPIC ROBOTIC HIATAL HERNIA REPAIR, CHERYL FUNDOPLICATION performed by Irena Sewell MD at [facility] N/A 03/27/2020    EGD PEG TUBE PLACEMENT performed by Irena Sewell MD at [facility] N/A 2/8/2021    **CASE IN O.R. W/ GI STAFF - EGD WITH REMOVAL PEG TUBE performed by Jose Angel Noel MD at [facility]    HAND SURGERY      [facility]. CATH POWER PICC TRIPLE  03/04/2020         HYSTERECTOMY      Abdominal    JOINT REPLACEMENT Right     right hip    OVARY REMOVAL      RHINOPLASTY      TONSILLECTOMY      TOTAL HIP ARTHROPLASTY      TOTAL NEPHRECTOMY      atrophic from infections, age 13   Estella Tanisha TRACHEOSTOMY N/A 03/27/2020    **ADD ON **WANTS 10:00AM **TRACHEOTOMY performed by Irena Sewell MD at [address] [facility] 04/02/2020    TRACHEOTOMY EXCHANGE performed by Irena Sewell MD at [address] [place] [address] N/A 03/17/2020    BEDSIDE EGD ESOPHAGOGASTRODUODENOSCOPY (ICU) performed by Irena Sewell MD at [address] [place] [address] N/A 05/18/2020    EGD BIOPSY performed by Izabel Morejon MD at [address] [place] [address]  05/18/2020    EGD DILATION BALLOON performed by Izabel Morejon MD at [address] [place] [address] N/A 07/06/2020    ** CASE IN O.R. WITH GI STAFF** EGD ESOPHAGOGASTRODUODENOSCOPY performed by Melody Meza MD at [address] [place] [address] 11/09/2020    EGD DIAGNOSTIC ONLY performed by Bria Hudson MD at [address] [place] [address]  02/08/2021    - EGD WITH REMOVAL PEG TUBE,ERCP STENT REMOVAL,ERCP STONE REMOVAL    UPPER GASTROINTESTINAL ENDOSCOPY N/A 11/24/2021    EGD BIOPSY performed by Bria Hudson MD at [facility] Medications:      lidocaine viscous hcl  15 mL Mouth/Throat TID AC    amitriptyline  25 mg Oral BID    busPIRone  10 mg Oral BID    escitalopram  10 mg Oral Daily    HYDROcodone 5 mg - acetaminophen  1 tablet Oral BID    pantoprazole  40 mg Oral BID AC    meropenem  1,000 mg IntraVENous Q12H    sodium chloride flush  5-40 mL IntraVENous 2 times per day    enoxaparin  30 mg SubCUTAneous Daily    budesonide-formoterol  2 puff Inhalation BID    metoclopramide  5 mg Oral TID AC    polyethylene glycol  17 g Oral Every Other Day    QUEtiapine  25 mg Oral BID    atorvastatin  40 mg Oral Daily    sucralfate  1 g Oral 4x Daily    traZODone  100 mg Oral Nightly    tamsulosin  0.4 mg Oral Daily       Social History:     Social History     Socioeconomic History    Marital status:      Spouse name: Not on file    Number of children: 2    Years of education: Not on file    Highest education level: Not on file   Occupational History    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   Tobacco Use    Smoking status: Former Smoker     Packs/day: 1.00     Years: 50.00     Pack years: 50.00     Types: Cigarettes    Smokeless tobacco: Never Used    Tobacco comment: quit 1 year ago    Vaping Use    Vaping Use: Former    Substances: Always   Substance and Sexual Activity    Alcohol use: No    Drug use: No    Sexual activity: Never   Other Topics Concern    Not on file   Social History Narrative    Not on file     Social Determinants of Health     Financial Resource Strain:     Difficulty of Paying Living Expenses: Not on file   Food Insecurity:     Worried About [address] in the Last Year: Not on file    [address] in the Last Year: Not on file   Transportation Needs:     Lack of Transportation (Medical): Not on file    Lack of Transportation (Non-Medical):  Not on file   Physical Activity:     Days of Exercise per Week: Not on file    Minutes of Exercise per Session: Not on file   Stress:     Feeling of Stress : Not on file   Social Connections:     Frequency of Communication with Friends and Family: Not on file    Frequency of Social Gatherings with Friends and Family: Not on file    Attends Restorationism Services: Not on file    Active Member of Clubs or Organizations: Not on file    Attends Club or Organization Meetings: Not on file    Marital Status: Not on file   Intimate Partner Violence:     Fear of Current or Ex-Partner: Not on file    Emotionally Abused: Not on file    Physically Abused: Not on file    Sexually Abused: Not on file   Housing Stability:     Unable to Pay for Housing in the Last Year: Not on file    Number of [place] in the Last Year: Not on file    Unstable Housing in the Last Year: Not on file       Family History:     Family History   Problem Relation Age of Onset    Cancer Mother         uterine    Heart Attack Mother     Heart Attack Father     Other Maternal Grandfather         foot gangrene    Other Paternal Grandmother         bleeding ulcers    Other Paternal Grandfather         lung cancer        Allergies:   Prednisone, Compazine [prochlorperazine maleate], Penicillin v potassium, and Penicillins     Review of Systems:   Constitutional: No fevers or chills. No systemic complaints  Head: No headaches  Eyes: No double vision or blurry vision. No conjunctival inflammation. ENT: No sore throat or runny nose. . No hearing loss, tinnitus or vertigo. Cardiovascular: No chest pain or palpitations. No shortness of breath. No HOLLAND  Lung: No shortness of breath or cough. No sputum production  Abdomen: Nausea, vomiting, and abdominal pain. Estil Olivo No cramps or diarrhea  Genitourinary: No increased urinary frequency, or dysuria. No hematuria. No suprapubic or CVA pain  Musculoskeletal: No muscle aches or pains. No joint effusions, swelling or deformities  Hematologic: No bleeding or bruising. Neurologic: No headache, weakness, numbness, or tingling.   Integument: No rash, no ulcers. Psychiatric: No depression. Endocrine: No polyuria, no polydipsia, no polyphagia. Physical Examination :     Patient Vitals for the past 8 hrs:   BP Temp Temp src Pulse Resp SpO2 Weight   11/28/21 1235 104/61 97.7 °F (36.5 °C) Oral 83 15 96 % --   11/28/21 0912 115/63 98.4 °F (36.9 °C) Oral 88 16 99 % --   11/28/21 0600 -- -- -- -- -- -- 179 lb 0.2 oz (81.2 kg)     General Appearance: Awake, alert, and in no apparent distress  Head:  Normocephalic, no trauma  Eyes: Pupils equal, round, reactive to light and accommodation; extraocular movements intact; sclera anicteric; conjunctivae pink. No embolic phenomena. ENT: Oropharynx clear, without erythema, exudate, or thrush. No tenderness of sinuses. Mouth/throat: mucosa pink and moist. No lesions. Dentition in good repair. Neck:Supple, without lymphadenopathy. Thyroid normal, No bruits. Pulmonary/Chest: Clear to auscultation, without wheezes, rales, or rhonchi. No dullness to percussion. No egophony. Cardiovascular: Regular rate and rhythm without murmurs, rubs, or gallops. Abdomen: Soft, non tender. Bowel sounds normal. No organomegaly  All four Extremities: No cyanosis, clubbing, edema, or effusions. Neurologic: No gross sensory or motor deficits. Skin: Warm and dry with good turgor. No signs of peripheral arterial or venous insufficiency. No ulcerations. No open wounds. Medical Decision Making -Laboratory:     Updated   Order   11/26/21 4361  Culture, Blood 1   Collected: 11/23/21 2040  Preliminary result  Specimen: Blood    Specimen Description . BLOOD P Culture NO GROWTH 3 DAYS P   Special Requests RT AC 20 ML P            11/26/21 0737  Culture, Blood 1   Collected: 11/23/21 2149  Preliminary result  Specimen: Blood    Specimen Description . BLOOD P Culture NO GROWTH 3 DAYS P   Special Requests 13ML R HAND P                I have independently reviewed/ordered the following labs:    CBC with Differential:   Recent Labs

## 2021-11-29 LAB
ABSOLUTE EOS #: 0.11 K/UL (ref 0–0.4)
ABSOLUTE IMMATURE GRANULOCYTE: 0.15 K/UL (ref 0–0.3)
ABSOLUTE LYMPH #: 0.63 K/UL (ref 1–4.8)
ABSOLUTE MONO #: 0.48 K/UL (ref 0.1–0.8)
ALBUMIN SERPL-MCNC: 2.9 G/DL (ref 3.5–5.2)
ANION GAP SERPL CALCULATED.3IONS-SCNC: 9 MMOL/L (ref 9–17)
BASOPHILS # BLD: 1 % (ref 0–2)
BASOPHILS ABSOLUTE: 0.04 K/UL (ref 0–0.2)
BUN BLDV-MCNC: 9 MG/DL (ref 8–23)
BUN/CREAT BLD: ABNORMAL (ref 9–20)
CALCIUM SERPL-MCNC: 9.3 MG/DL (ref 8.6–10.4)
CHLORIDE BLD-SCNC: 98 MMOL/L (ref 98–107)
CO2: 25 MMOL/L (ref 20–31)
CREAT SERPL-MCNC: 0.76 MG/DL (ref 0.5–0.9)
CULTURE: NORMAL
CULTURE: NORMAL
DIFFERENTIAL TYPE: ABNORMAL
EOSINOPHILS RELATIVE PERCENT: 3 % (ref 1–4)
GFR AFRICAN AMERICAN: >60 ML/MIN
GFR NON-AFRICAN AMERICAN: >60 ML/MIN
GFR SERPL CREATININE-BSD FRML MDRD: ABNORMAL ML/MIN/{1.73_M2}
GFR SERPL CREATININE-BSD FRML MDRD: ABNORMAL ML/MIN/{1.73_M2}
GLUCOSE BLD-MCNC: 98 MG/DL (ref 70–99)
HCT VFR BLD CALC: 38.1 % (ref 36.3–47.1)
HEMOGLOBIN: 12 G/DL (ref 11.9–15.1)
IMMATURE GRANULOCYTES: 4 %
LYMPHOCYTES # BLD: 17 % (ref 24–44)
Lab: NORMAL
Lab: NORMAL
MAGNESIUM: 1.9 MG/DL (ref 1.6–2.6)
MCH RBC QN AUTO: 27.9 PG (ref 25.2–33.5)
MCHC RBC AUTO-ENTMCNC: 31.5 G/DL (ref 28.4–34.8)
MCV RBC AUTO: 88.6 FL (ref 82.6–102.9)
MONOCYTES # BLD: 13 % (ref 1–7)
MORPHOLOGY: ABNORMAL
NRBC AUTOMATED: 0 PER 100 WBC
PDW BLD-RTO: 15.8 % (ref 11.8–14.4)
PHOSPHORUS: 3.3 MG/DL (ref 2.6–4.5)
PLATELET # BLD: 176 K/UL (ref 138–453)
PLATELET ESTIMATE: ABNORMAL
PMV BLD AUTO: 9.7 FL (ref 8.1–13.5)
POTASSIUM SERPL-SCNC: 4.3 MMOL/L (ref 3.7–5.3)
RBC # BLD: 4.3 M/UL (ref 3.95–5.11)
RBC # BLD: ABNORMAL 10*6/UL
SEG NEUTROPHILS: 62 % (ref 36–66)
SEGMENTED NEUTROPHILS ABSOLUTE COUNT: 2.29 K/UL (ref 1.8–7.7)
SODIUM BLD-SCNC: 132 MMOL/L (ref 135–144)
SPECIMEN DESCRIPTION: NORMAL
SPECIMEN DESCRIPTION: NORMAL
SURGICAL PATHOLOGY REPORT: NORMAL
WBC # BLD: 3.7 K/UL (ref 3.5–11.3)
WBC # BLD: ABNORMAL 10*3/UL

## 2021-11-29 PROCEDURE — 2580000003 HC RX 258: Performed by: INTERNAL MEDICINE

## 2021-11-29 PROCEDURE — 6370000000 HC RX 637 (ALT 250 FOR IP): Performed by: INTERNAL MEDICINE

## 2021-11-29 PROCEDURE — 85025 COMPLETE CBC W/AUTO DIFF WBC: CPT

## 2021-11-29 PROCEDURE — 6360000002 HC RX W HCPCS: Performed by: INTERNAL MEDICINE

## 2021-11-29 PROCEDURE — 83735 ASSAY OF MAGNESIUM: CPT

## 2021-11-29 PROCEDURE — 36415 COLL VENOUS BLD VENIPUNCTURE: CPT

## 2021-11-29 PROCEDURE — 99232 SBSQ HOSP IP/OBS MODERATE 35: CPT | Performed by: INTERNAL MEDICINE

## 2021-11-29 PROCEDURE — 6370000000 HC RX 637 (ALT 250 FOR IP): Performed by: NURSE PRACTITIONER

## 2021-11-29 PROCEDURE — 2580000003 HC RX 258: Performed by: NURSE PRACTITIONER

## 2021-11-29 PROCEDURE — 2060000000 HC ICU INTERMEDIATE R&B

## 2021-11-29 PROCEDURE — 80069 RENAL FUNCTION PANEL: CPT

## 2021-11-29 PROCEDURE — 6370000000 HC RX 637 (ALT 250 FOR IP): Performed by: STUDENT IN AN ORGANIZED HEALTH CARE EDUCATION/TRAINING PROGRAM

## 2021-11-29 PROCEDURE — 6360000002 HC RX W HCPCS: Performed by: NURSE PRACTITIONER

## 2021-11-29 PROCEDURE — 2700000000 HC OXYGEN THERAPY PER DAY

## 2021-11-29 PROCEDURE — 94640 AIRWAY INHALATION TREATMENT: CPT

## 2021-11-29 RX ADMIN — OXYBUTYNIN CHLORIDE 5 MG: 5 TABLET ORAL at 08:11

## 2021-11-29 RX ADMIN — MORPHINE SULFATE 2 MG: 4 INJECTION, SOLUTION INTRAMUSCULAR; INTRAVENOUS at 22:50

## 2021-11-29 RX ADMIN — MORPHINE SULFATE 2 MG: 4 INJECTION, SOLUTION INTRAMUSCULAR; INTRAVENOUS at 08:38

## 2021-11-29 RX ADMIN — SUCRALFATE 1 G: 1 TABLET ORAL at 17:21

## 2021-11-29 RX ADMIN — MORPHINE SULFATE 2 MG: 4 INJECTION, SOLUTION INTRAMUSCULAR; INTRAVENOUS at 12:37

## 2021-11-29 RX ADMIN — ONDANSETRON 4 MG: 2 INJECTION INTRAMUSCULAR; INTRAVENOUS at 12:37

## 2021-11-29 RX ADMIN — TRAZODONE HYDROCHLORIDE 100 MG: 100 TABLET ORAL at 20:39

## 2021-11-29 RX ADMIN — LIDOCAINE HYDROCHLORIDE 15 ML: 20 SOLUTION ORAL; TOPICAL at 08:11

## 2021-11-29 RX ADMIN — ESCITALOPRAM OXALATE 10 MG: 10 TABLET ORAL at 08:11

## 2021-11-29 RX ADMIN — SODIUM CHLORIDE, PRESERVATIVE FREE 10 ML: 5 INJECTION INTRAVENOUS at 20:40

## 2021-11-29 RX ADMIN — ENOXAPARIN SODIUM 30 MG: 100 INJECTION SUBCUTANEOUS at 08:10

## 2021-11-29 RX ADMIN — PANTOPRAZOLE SODIUM 40 MG: 40 TABLET, DELAYED RELEASE ORAL at 06:41

## 2021-11-29 RX ADMIN — BUDESONIDE AND FORMOTEROL FUMARATE DIHYDRATE 2 PUFF: 160; 4.5 AEROSOL RESPIRATORY (INHALATION) at 07:58

## 2021-11-29 RX ADMIN — SUCRALFATE 1 G: 1 TABLET ORAL at 14:13

## 2021-11-29 RX ADMIN — HYDROCODONE BITARTRATE AND ACETAMINOPHEN 1 TABLET: 5; 325 TABLET ORAL at 08:06

## 2021-11-29 RX ADMIN — TAMSULOSIN HYDROCHLORIDE 0.4 MG: 0.4 CAPSULE ORAL at 08:09

## 2021-11-29 RX ADMIN — SUCRALFATE 1 G: 1 TABLET ORAL at 20:39

## 2021-11-29 RX ADMIN — METOCLOPRAMIDE 5 MG: 5 TABLET ORAL at 11:06

## 2021-11-29 RX ADMIN — SODIUM CHLORIDE, PRESERVATIVE FREE 10 ML: 5 INJECTION INTRAVENOUS at 08:13

## 2021-11-29 RX ADMIN — AMITRIPTYLINE HYDROCHLORIDE 25 MG: 25 TABLET, FILM COATED ORAL at 20:39

## 2021-11-29 RX ADMIN — HYDROCODONE BITARTRATE AND ACETAMINOPHEN 1 TABLET: 5; 325 TABLET ORAL at 20:39

## 2021-11-29 RX ADMIN — LIDOCAINE HYDROCHLORIDE 15 ML: 20 SOLUTION ORAL; TOPICAL at 11:04

## 2021-11-29 RX ADMIN — SUCRALFATE 1 G: 1 TABLET ORAL at 08:08

## 2021-11-29 RX ADMIN — METOCLOPRAMIDE 5 MG: 5 TABLET ORAL at 06:41

## 2021-11-29 RX ADMIN — BUSPIRONE HYDROCHLORIDE 10 MG: 10 TABLET ORAL at 20:39

## 2021-11-29 RX ADMIN — METOCLOPRAMIDE 5 MG: 5 TABLET ORAL at 17:22

## 2021-11-29 RX ADMIN — QUETIAPINE FUMARATE 25 MG: 25 TABLET ORAL at 08:08

## 2021-11-29 RX ADMIN — LORAZEPAM 1 MG: 0.5 TABLET ORAL at 00:26

## 2021-11-29 RX ADMIN — BUSPIRONE HYDROCHLORIDE 10 MG: 10 TABLET ORAL at 08:09

## 2021-11-29 RX ADMIN — ANTACID TABLETS 1000 MG: 500 TABLET, CHEWABLE ORAL at 06:40

## 2021-11-29 RX ADMIN — MEROPENEM 1000 MG: 1 INJECTION, POWDER, FOR SOLUTION INTRAVENOUS at 17:32

## 2021-11-29 RX ADMIN — ONDANSETRON 4 MG: 2 INJECTION INTRAMUSCULAR; INTRAVENOUS at 20:43

## 2021-11-29 RX ADMIN — QUETIAPINE FUMARATE 25 MG: 25 TABLET ORAL at 20:39

## 2021-11-29 RX ADMIN — ATORVASTATIN CALCIUM 40 MG: 80 TABLET, FILM COATED ORAL at 08:08

## 2021-11-29 RX ADMIN — MEROPENEM 1000 MG: 1 INJECTION, POWDER, FOR SOLUTION INTRAVENOUS at 11:06

## 2021-11-29 RX ADMIN — AMITRIPTYLINE HYDROCHLORIDE 25 MG: 25 TABLET, FILM COATED ORAL at 08:07

## 2021-11-29 ASSESSMENT — PAIN DESCRIPTION - DESCRIPTORS
DESCRIPTORS: CRAMPING

## 2021-11-29 ASSESSMENT — PAIN SCALES - GENERAL
PAINLEVEL_OUTOF10: 5
PAINLEVEL_OUTOF10: 1
PAINLEVEL_OUTOF10: 0
PAINLEVEL_OUTOF10: 5
PAINLEVEL_OUTOF10: 8
PAINLEVEL_OUTOF10: 5
PAINLEVEL_OUTOF10: 3
PAINLEVEL_OUTOF10: 1
PAINLEVEL_OUTOF10: 3
PAINLEVEL_OUTOF10: 8
PAINLEVEL_OUTOF10: 3

## 2021-11-29 ASSESSMENT — PAIN DESCRIPTION - FREQUENCY
FREQUENCY: CONTINUOUS

## 2021-11-29 ASSESSMENT — PAIN DESCRIPTION - LOCATION
LOCATION: ABDOMEN

## 2021-11-29 ASSESSMENT — PAIN DESCRIPTION - PAIN TYPE
TYPE: ACUTE PAIN

## 2021-11-29 ASSESSMENT — PAIN DESCRIPTION - PROGRESSION
CLINICAL_PROGRESSION: GRADUALLY WORSENING
CLINICAL_PROGRESSION: GRADUALLY IMPROVING
CLINICAL_PROGRESSION: GRADUALLY WORSENING

## 2021-11-29 NOTE — PROGRESS NOTES
[facility] - Progress Note    Today's Date and Time: 11/29/2021, 9:31 AM    Impression :   · Recurrent Aspiration pneumonia  · Hiatal hernia  · Distal esophageal dilatation with stasis  · Anxiety  · Hx of MDRO, ESBL + bacteria    Recommendations:   · Continue meropenem. Stop date 11-30-21  · ID will sign off     Medical Decision Making/Summary/Discussion:   ·   Infection Control Recommendations   · [place] Precautions  · Contact Isolation Hx Klebsiella ESBL +    Antimicrobial Stewardship Recommendations     · Simplification of therapy  · Targeted therapy    Coordination of Outpatient Care:   · Estimated Length of IV antimicrobials:TBD  · Patient will need Midline Catheter Insertion: no  · Patient will need PICC line Insertion:No  · Patient will need: Home IV , [place],  SNF,  LTAC: TBD  · Patient will need outpatient wound care:No    Chief complaint/reason for consultation:   · Aspiration pneumonia      History of Present Illness:   Magui Bills is a 68y.o.-year-old  female who was initially admitted on 11/23/2021. Patient seen at the request of Dr. Chary Troy    [facility] of anxiety/depression    INITIAL HISTORY:    Patient  presented with a fever, T 103, and 3 weeks of nausea/vomiting and abdominal pain, constipation x 5 days, expressive aphasia. Denied any headache or weakness in her upper or lower extremities. At the ED, she was Saturating on room air,   Pertinent labs include Cr 1.18, lactic acid 2.3, wbc 8.9,     CT chest concerning for chronic recurrent aspiration pneumonia. Hiatal hernia, distal esophageal distension with stasis of refluxed fluid. ID consulted      CURRENT EVALUATION: 11/29/21     Continue meropenem until 11-30-21. Patient underwent EGD with findings of:  · Tortuous esophagus with dilated distal third. · Evidence of prior fundoplication which appeared to be partially undone  · Sarah-Abrams tear.   · Severely congested and inflamed appearing proximal stomach  · Normal distal stomach  · Normal examined duodenum      Afebrile  VS stable    On room air  RR 14-->19-->15  02 sat 94-->96    Patient feels better. Reports chronic sputum production with yellowish phlegm  Reports epigastric pain. Overall looks better  Plans for discharge tomorrow. Labs: 11/29/2021      BUN:13-->6-->9  Creatinine: 1.21-->0.9-->0.76    WBC: 7.9-->3.9-->3.7  Hgb: 12.4-->12.0  Platelet: 238-->556-->435      Lactic acid 2.3-->0.9    Cultures:  Urine:  · 11/23/2021: No growth  Blood:  · 11/23/2021 x 2 No growth  · 11/26/21: x 2 no growth   · 11/29/21: x 2 no growth   Sputum :  ·   Wound:     CSF     CT chest 11/24/2021    CT Head WO Contrast    Result Date: 11/23/2021  No CT evidence of an acute infarct. The findings were sent to the Radiology Results [address] at 8:54 pm on 11/23/2021to be communicated to a licensed caregiver. CT CHEST WO CONTRAST    Result Date: 11/24/2021  1. Basilar opacities, right greater than left, with central airway congestion and filling of the peripheral subsegmental bronchi in the right lower lobe. Overall these features are suggestive of chronic/recurrent aspiration. 2.  Hiatal hernia with diffuse mild esophageal dilatation with fluid, which may represent reflux or stasis. 3.  Emphysema. CT ABDOMEN PELVIS W IV CONTRAST Additional Contrast? None    Result Date: 11/23/2021  Moderate hiatal hernia with a fluid-filled patulous distal esophagus which is similar to prior comparison exams. Distended stomach with a normal caliber duodenum. Query possible gastroparesis or functional outlet obstruction. Possible findings of proctitis. Correlate clinically. Hepatomegaly with steatosis, unchanged. Findings in the dependent lung bases which are suspicious for sequelae of aspiration pneumonia in the appropriate clinical setting.      XR CHEST PORTABLE    Result Date: 11/23/2021  Chronic lung changes with right basilar airspace opacity concerning for underlying pneumonia in the appropriate clinical setting. Correlate with aspiration risk. CTA HEAD NECK W CONTRAST    Result Date: 11/23/2021  No large vessel occlusion visualized within the head or neck. Incidentally noted pulmonary emphysema and dilated, air and fluid-filled esophagus. Discussed with patient, RN, family. I have personally reviewed the past medical history, past surgical history, medications, social history, and family history, and I have updated the database accordingly.   Past Medical History:     Past Medical History:   Diagnosis Date    Anxiety     Arthritis     Back pain     Bronchitis     Caffeine use     8 coffee / day    Carpal tunnel syndrome     Cataracts, bilateral     Constipation     Depression     Diarrhea     GERD (gastroesophageal reflux disease)     H/O gastric ulcer     Hyperlipidemia     Hypertension     Mumps     MVP (mitral valve prolapse)     Dr. Randall Swann in May 2019    Recurrent UTI     Dr. Lynsey Fernando    Sinusitis     Tracheostomy in place [facility])     Ulcerative esophagitis     Wellness examination     Dr. Connie Kruger seen in Jan 2020       Past Surgical  History:     Past Surgical History:   Procedure Laterality Date    APPENDECTOMY      CARPAL TUNNEL RELEASE      CHOLECYSTECTOMY, LAPAROSCOPIC N/A 05/22/2020    XI LAPAROSCOPIC ROBOTIC CHOLECYSTECTOMY, PYLOROPLASTY performed by Cecile Mahmood MD at [facility] ERCP  05/21/2020    with stent insertion, balloon dilation, sphinctereotomy    ERCP  05/21/2020    ** CASE IN OR WITY GI STAFF** ERCP STENT INSERTION performed by Dorie Hartmann MD at [facility] End[facility]    ERCP  05/21/2020    ** CASE IN OR WITH GI STAFF**ERCP SPHINCTER/PAPILLOTOMY performed by Dorie Hartmann MD at [facility]    ERCP  05/21/2020    ** CASE IN OR WITH GI STAFF**ERCP DILATION BALLOON performed by Dorie Hartmann MD at [facility]    ERCP N/A 2/8/2021    ERCP STENT REMOVAL performed by Alycia Vergara MD at [facility]    ERCP  2/8/2021    ERCP STONE REMOVAL performed by Alycia Vergara MD at [address]      patricia IOL    GASTRIC FUNDOPLICATION N/A 43/01/1761    XI LAPAROSCOPIC ROBOTIC HIATAL HERNIA REPAIR, CHERYL FUNDOPLICATION performed by Holden Fuller MD at [address] 03/27/2020    EGD PEG TUBE PLACEMENT performed by Holden Fuller MD at [address] [facility] N/A 2/8/2021    **CASE IN O.R. W/ GI STAFF - EGD WITH REMOVAL PEG TUBE performed by Alycia Vergara MD at [facility]    HAND SURGERY      [facility]. CATH POWER PICC TRIPLE  03/04/2020         HYSTERECTOMY      Abdominal    JOINT REPLACEMENT Right     right hip    OVARY REMOVAL      RHINOPLASTY      TONSILLECTOMY      TOTAL HIP ARTHROPLASTY      TOTAL NEPHRECTOMY      atrophic from infections, age 13   Abiola Se TRACHEOSTOMY N/A 03/27/2020    **ADD ON **WANTS 10:00AM **TRACHEOTOMY performed by Holden Fuller MD at [address] [facility] 04/02/2020    TRACHEOTOMY EXCHANGE performed by Holden Fuller MD at [address] [place] [address] N/A 03/17/2020    BEDSIDE EGD ESOPHAGOGASTRODUODENOSCOPY (ICU) performed by Holden Fuller MD at [address] [place] [address] N/A 05/18/2020    EGD BIOPSY performed by Sandra Felix MD at [address] [place] [address]  05/18/2020    EGD DILATION BALLOON performed by Sandra Felix MD at [address] [place] [address] N/A 07/06/2020    ** CASE IN O.R. WITH GI STAFF** EGD ESOPHAGOGASTRODUODENOSCOPY performed by Marcus Babb MD at [address] [place] [address] 11/09/2020    EGD DIAGNOSTIC ONLY performed by Bert Cat MD at [address] [place] [address]  02/08/2021    - EGD WITH REMOVAL PEG TUBE,ERCP STENT REMOVAL,ERCP STONE REMOVAL    UPPER GASTROINTESTINAL ENDOSCOPY N/A 11/24/2021    EGD BIOPSY performed by Ever Luque MD at [facility] End[facility]       Medications:      lidocaine viscous hcl  15 mL Mouth/Throat TID AC    amitriptyline  25 mg Oral BID    busPIRone  10 mg Oral BID    escitalopram  10 mg Oral Daily    HYDROcodone 5 mg - acetaminophen  1 tablet Oral BID    pantoprazole  40 mg Oral BID AC    meropenem  1,000 mg IntraVENous Q12H    sodium chloride flush  5-40 mL IntraVENous 2 times per day    enoxaparin  30 mg SubCUTAneous Daily    budesonide-formoterol  2 puff Inhalation BID    metoclopramide  5 mg Oral TID AC    polyethylene glycol  17 g Oral Every Other Day    QUEtiapine  25 mg Oral BID    atorvastatin  40 mg Oral Daily    sucralfate  1 g Oral 4x Daily    traZODone  100 mg Oral Nightly    tamsulosin  0.4 mg Oral Daily       Social History:     Social History     Socioeconomic History    Marital status:      Spouse name: Not on file    Number of children: 2    Years of education: Not on file    Highest education level: Not on file   Occupational History    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   Tobacco Use    Smoking status: Former Smoker     Packs/day: 1.00     Years: 50.00     Pack years: 50.00     Types: Cigarettes    Smokeless tobacco: Never Used    Tobacco comment: quit 1 year ago    Vaping Use    Vaping Use: Former    Substances: Always   Substance and Sexual Activity    Alcohol use: No    Drug use: No    Sexual activity: Never   Other Topics Concern    Not on file   Social History Narrative    Not on file     Social Determinants of Health     Financial Resource Strain:     Difficulty of Paying Living Expenses: Not on file   Food Insecurity:     Worried About [address] in the Last Year: Not on file    [address] in the Last Year: Not on file   Transportation Needs:     Lack of Transportation (Medical): Not on file    Lack of Transportation (Non-Medical):  Not on file   Physical Activity:  Days of Exercise per Week: Not on file    Minutes of Exercise per Session: Not on file   Stress:     Feeling of Stress : Not on file   Social Connections:     Frequency of Communication with Friends and Family: Not on file    Frequency of Social Gatherings with Friends and Family: Not on file    Attends Mandaeism Services: Not on file    Active Member of Clubs or Organizations: Not on file    Attends Club or Organization Meetings: Not on file    Marital Status: Not on file   Intimate Partner Violence:     Fear of Current or Ex-Partner: Not on file    Emotionally Abused: Not on file    Physically Abused: Not on file    Sexually Abused: Not on file   Housing Stability:     Unable to Pay for Housing in the Last Year: Not on file    Number of [place] in the Last Year: Not on file    Unstable Housing in the Last Year: Not on file       Family History:     Family History   Problem Relation Age of Onset    Cancer Mother         uterine    Heart Attack Mother     Heart Attack Father     Other Maternal Grandfather         foot gangrene    Other Paternal Grandmother         bleeding ulcers    Other Paternal Grandfather         lung cancer        Allergies:   Prednisone, Compazine [prochlorperazine maleate], Penicillin v potassium, and Penicillins     Review of Systems:   Constitutional: No fevers or chills. No systemic complaints  Head: No headaches  Eyes: No double vision or blurry vision. No conjunctival inflammation. ENT: No sore throat or runny nose. . No hearing loss, tinnitus or vertigo. Cardiovascular: No chest pain or palpitations. No shortness of breath. No HOLLAND  Lung: No shortness of breath or cough. No sputum production  Abdomen: Nausea, vomiting, and abdominal pain. Emilia Bloodgood No cramps or diarrhea  Genitourinary: No increased urinary frequency, or dysuria. No hematuria. No suprapubic or CVA pain  Musculoskeletal: No muscle aches or pains.  No joint effusions, swelling or deformities  Hematologic: No bleeding or bruising. Neurologic: No headache, weakness, numbness, or tingling. Integument: No rash, no ulcers. Psychiatric: No depression. Endocrine: No polyuria, no polydipsia, no polyphagia. Physical Examination :     Patient Vitals for the past 8 hrs:   BP Temp Temp src Pulse Resp SpO2   11/29/21 0830 -- 98.8 °F (37.1 °C) -- -- -- --   11/29/21 0817 125/65 -- Oral 78 20 96 %   11/29/21 0759 -- -- -- -- 17 96 %   11/29/21 0758 -- -- -- -- 16 94 %   11/29/21 0430 (!) 107/53 97.3 °F (36.3 °C) Oral 74 16 96 %     General Appearance: Awake, alert, and in no apparent distress  Head:  Normocephalic, no trauma  Eyes: Pupils equal, round, reactive to light and accommodation; extraocular movements intact; sclera anicteric; conjunctivae pink. No embolic phenomena. ENT: Oropharynx clear, without erythema, exudate, or thrush. No tenderness of sinuses. Mouth/throat: mucosa pink and moist. No lesions. Dentition in good repair. Neck:Supple, without lymphadenopathy. Thyroid normal, No bruits. Pulmonary/Chest: Clear to auscultation, without wheezes, rales, or rhonchi. No dullness to percussion. No egophony. Cardiovascular: Regular rate and rhythm without murmurs, rubs, or gallops. Abdomen: Soft, non tender. Bowel sounds normal. No organomegaly  All four Extremities: No cyanosis, clubbing, edema, or effusions. Neurologic: No gross sensory or motor deficits. Skin: Warm and dry with good turgor. No signs of peripheral arterial or venous insufficiency. No ulcerations. No open wounds. Medical Decision Making -Laboratory:     Updated   Order   11/29/21 0738  Culture, Blood 1   Collected: 11/23/21 2149  Final result  Specimen: Blood    Specimen Description . BLOOD Culture NO GROWTH 6 DAYS   Special Requests 13ML R HAND            11/29/21 0738  Culture, Blood 1   Collected: 11/23/21 2040  Final result  Specimen: Blood    Specimen Description . BLOOD Culture NO GROWTH 6 DAYS   Special Requests RT AC 20 ML          Updated   Order   11/26/21 0737  Culture, Blood 1   Collected: 11/23/21 2040  Preliminary result  Specimen: Blood    Specimen Description . BLOOD P Culture NO GROWTH 3 DAYS P   Special Requests RT AC 20 ML P            11/26/21 0737  Culture, Blood 1   Collected: 11/23/21 2149  Preliminary result  Specimen: Blood    Specimen Description . BLOOD P Culture NO GROWTH 3 DAYS P   Special Requests 13ML R HAND P              I have independently reviewed/ordered the following labs:    CBC with Differential:   Recent Labs     11/28/21  0837 11/29/21  0553   WBC 3.9 3.7   HGB 12.4 12.0   HCT 38.9 38.1    176   LYMPHOPCT 11* 17*   MONOPCT 3 13*     BMP:   Recent Labs     11/28/21  0837 11/29/21  0553   * 132*   K 4.0 4.3    98   CO2 25 25   BUN 6* 9   CREATININE 0.69 0.76   MG 1.8 1.9     Hepatic Function Panel:   Recent Labs     11/26/21  1637 11/27/21  1802 11/28/21  0837 11/29/21  0553   PROT 6.1*  --   --   --    LABALBU 3.3*   < > 3.4* 2.9*   BILITOT 0.28*  --   --   --    ALKPHOS 112*  --   --   --    ALT 16  --   --   --    AST 18  --   --   --     < > = values in this interval not displayed. No results for input(s): RPR in the last 72 hours. No results for input(s): HIV in the last 72 hours. No results for input(s): BC in the last 72 hours. Lab Results   Component Value Date    MUCUS NOT REPORTED 11/23/2021    RBC 4.30 11/29/2021    TRICHOMONAS NOT REPORTED 11/23/2021    WBC 3.7 11/29/2021    YEAST NOT REPORTED 11/23/2021    TURBIDITY Clear 11/23/2021     Lab Results   Component Value Date    CREATININE 0.76 11/29/2021    GLUCOSE 98 11/29/2021       Medical Decision Making-Imaging:       Medical Decision Making-Other: Thank you for allowing us to participate in the care of this patient. Please call with questions. Shy Haynes participated in the evaluation of this patient.      Pager: [phone] - Office: [phone]

## 2021-11-29 NOTE — PROGRESS NOTES
Physical Therapy        Physical Therapy Cancel Note      DATE: 2021    NAME: Vick Barreto  MRN: [id number]   : 1945      Patient not seen this date for Physical Therapy due to:    Patient Declined: Pt declined stating that she is going home tomorrow       Electronically signed by Kim Farmer PT on 2021 at 3:22 PM

## 2021-11-29 NOTE — PLAN OF CARE
Problem: Falls - Risk of:  Goal: Will remain free from falls  Description: Will remain free from falls  Outcome: Ongoing  Goal: Absence of physical injury  Description: Absence of physical injury  Outcome: Ongoing     Problem: Skin Integrity:  Goal: Will show no infection signs and symptoms  Description: Will show no infection signs and symptoms  Outcome: Ongoing  Goal: Absence of new skin breakdown  Description: Absence of new skin breakdown  Outcome: Ongoing     Problem: Anxiety:  Goal: Level of anxiety will decrease  Description: Level of anxiety will decrease  Outcome: Ongoing     Problem: Infection:  Goal: Will remain free from infection  Description: Will remain free from infection  Outcome: Ongoing     Problem: Safety:  Goal: Free from accidental physical injury  Description: Free from accidental physical injury  Outcome: Ongoing  Goal: Free from intentional harm  Description: Free from intentional harm  Outcome: Ongoing     Problem: Daily Care:  Goal: Daily care needs are met  Description: Daily care needs are met  Outcome: Ongoing     Problem: Pain:  Goal: Patient's pain/discomfort is manageable  Description: Patient's pain/discomfort is manageable  Outcome: Ongoing  Goal: Pain level will decrease  Description: Pain level will decrease  Outcome: Ongoing  Goal: Control of acute pain  Description: Control of acute pain  Outcome: Ongoing  Goal: Control of chronic pain  Description: Control of chronic pain  Outcome: Ongoing     Problem: Skin Integrity:  Goal: Skin integrity will stabilize  Description: Skin integrity will stabilize  Outcome: Ongoing     Problem: Discharge Planning:  Goal: Patients continuum of care needs are met  Description: Patients continuum of care needs are met  Outcome: Ongoing     Problem: Falls - Risk of:  Goal: Will remain free from falls  Description: Will remain free from falls  Outcome: Ongoing     Problem: Falls - Risk of:  Goal: Absence of physical injury  Description: Absence

## 2021-11-29 NOTE — PROGRESS NOTES
[facility]  Office: [address], DO, Jeff Joseet, DO, Dee Chow, DO, Sharmila Ayad Jensen, DO, Nano Jaimes MD, Rocio Chavez MD, Jordan Mann MD, Elías Cardozo MD, Saleem Esparza MD, Valorie Sharma MD, Liset Molina MD, Lisy Barnhart, DO, Supa Molina, DO, Rachel Childress MD,  Claudetta Shilling, DO, Aleshia Batista MD, Ngozi Mann MD, Stacy Guerrero MD, Amelia Bee MD, Mario Morillo MD, Janet Cordova MD, Stephani Auguste MD, Ramana Babin, [facility], [facility], CNP, Shilpa Camp, CNP, Teresa Soto, CNS, Abran Hooper, CNP, Jillian Clubs, [facility], Lona Nietoole, CNP, Wing Montalvo, CNP, Vangie Patten, CNP, Julian Santana PA-C, Sundar Zuleta, [facility], Junito Pan, CNP, Zelda Yates, CNP, Pamela Fischer, CNP, Shayla Segura, CNP, Suad Morton, CNP, Escobar Rizo, CNP, Shaniqua Anglin, [address] [place] [address]    Progress Note    11/29/2021    1:42 PM    Name:   Keesha Gtz  MRN:     [id number]     Acct:      [de-identified]   Room:   [address] [place] [address] Day:  6  Admit Date:  11/23/2021  8:12 PM    PCP:   Feliz Mast MD  Code Status:  Full Code    Subjective:     C/C:   Chief Complaint   Patient presents with    Abdominal Pain    Altered Mental Status     Interval History Status: not changed. Patient looking better today. She is tolerating her diet better. Denies any fevers, chills. Still having pain when swallowing but denies any chest pain. Tolerating her diet better. Brief History:     59-year-old female past medical history of anxiety, depression, prior history of Nissen fundoplication presented with fevers chills nausea vomiting abdominal pain as well as difficulty speaking continued abdominal pain. GI and general surgery were consulted as well as infectious disease. Patient also being followed by neurology due to concern of altered mental status.   Patient underwent EGD and biopsy on 11/24/2021 showing:  Findings:   1. The esophagus was tortuous with dilated distal third. 2. The GE junction was noted at 38 cm. 3. The GE junction compliant to the passage of scope without evidence of stricture or stenosis. 4. Mild reflux esophagitis was noted at the GE junction.   5. Sarah-Abrams tear. 6. The mucosa in the fundus, cardia and proximal gastric body appeared severely congested, inflamed with diffuse erythema, and granularity.  Biopsies were performed for histology. 7. The distal gastric body antrum and pylorus appeared normal.  8. The examined duodenum appeared normal.     Recommendations:  1. Await pathology results. 2. Continue IV pantoprazole 40 mg twice daily for 2 days then change to oral once daily thereafter. 3. Start full liquid diet and advance as tolerated. 4. Patient's nausea vomiting most likely due to infectious (viral) vs medication gastritis.  This will resolve with supportive care and antiemetics. 5. Gastric distention noted on CT is most likely due to intractable nausea vomiting.  Due to recurrent nausea vomiting. 6. Continue supportive care.  Once patient is able to tolerate diet she can be discharged home. 7. Patient to follow-up with primary GI: Dr Nelida Arenas      Review of Systems:     Constitutional:  negative for chills, fevers, sweats  Respiratory:  negative for cough, dyspnea on exertion, shortness of breath, wheezing  Cardiovascular:  negative for chest pain, chest pressure/discomfort, lower extremity edema, palpitations  Gastrointestinal: Admits to abdominal pain, difficulty tolerating her diet, denies constipation, diarrhea, nausea, vomiting  Neurological:  negative for dizziness, headache    Medications: Allergies:     Allergies   Allergen Reactions    Prednisone Anxiety    Compazine [Prochlorperazine Maleate]     Penicillin V Potassium     Penicillins Other (See Comments)     Shock- received meropenem and ceftriaxone wo issues 2020       Current Meds:   Scheduled (!) 98/56   Pulse 76   Temp 97.3 °F (36.3 °C) (Oral)   Resp 20   Ht 5' 2\" (1.575 m)   Wt 179 lb 0.2 oz (81.2 kg)   SpO2 96%   BMI 32.74 kg/m²   Temp (24hrs), Av °F (36.7 °C), Min:97.3 °F (36.3 °C), Max:98.8 °F (37.1 °C)    No results for input(s): POCGLU in the last 72 hours. I/O (24Hr): Intake/Output Summary (Last 24 hours) at 2021 1342  Last data filed at 2021 0430  Gross per 24 hour   Intake --   Output 1750 ml   Net -1750 ml       Labs:  Hematology:  Recent Labs     21  0821  0553   WBC 4.8 3.9 3.7   RBC 4.13 4.42 4.30   HGB 11.5* 12.4 12.0   HCT 37.6 38.9 38.1   MCV 91.0 88.0 88.6   MCH 27.8 28.1 27.9   MCHC 30.6 31.9 31.5   RDW 15.7* 15.7* 15.8*    189 176   MPV 9.9 10.3 9.7     Chemistry:  Recent Labs     21  1637 21  0837 21  0553   NA   < >  --   --   --  135 134* 132*   K   < >  --   --   --  3.8 4.0 4.3   CL   < >  --   --   --  105 100 98   CO2   < >  --   --   --  17* 25 25   GLUCOSE   < >  --   --   --  104* 107* 98   BUN   < >  --   --   --  7* 6* 9   CREATININE   < >  --   --   --  0.76 0.69 0.76   MG  --   --   --   --  2.1 1.8 1.9   ANIONGAP   < >  --   --   --  13 9 9   LABGLOM   < >  --   --   --  >60 >60 >60   GFRAA   < >  --   --   --  >60 >60 >60   CALCIUM   < >  --   --   --  9.0 9.0 9.3   PHOS  --   --   --   --  2.7 2.5* 3.3   TROPHS  --  28* 26* 22*  --   --   --    MYOGLOBIN  --  44 30 31  --   --   --     < > = values in this interval not displayed. Recent Labs     21  1637 21  1637 21  1802 21  0837 21  0553   PROT 6.1*  --   --   --   --    LABALBU 3.3*   < > 2.9* 3.4* 2.9*   AST 18  --   --   --   --    ALT 16  --   --   --   --    ALKPHOS 112*  --   --   --   --    BILITOT 0.28*  --   --   --   --     < > = values in this interval not displayed.      ABG:  Lab Results   Component Value Date    POCPH 7.649 05/30/2020    POCPCO2 42.3 05/30/2020    POCPO2 129.9 05/30/2020    POCHCO3 28.6 05/30/2020    NBEA NOT REPORTED 05/30/2020    PBEA 4 05/30/2020    WTU2SUO 30 05/30/2020    GELZ8JZX 99 05/30/2020    FIO2 NOT REPORTED 11/23/2021     Lab Results   Component Value Date/Time    SPECIAL NOT REPORTED 11/23/2021 10:12 PM     Lab Results   Component Value Date/Time    CULTURE NO GROWTH 11/23/2021 10:12 PM       Radiology:  CT Head WO Contrast    Result Date: 11/23/2021  No CT evidence of an acute infarct. The findings were sent to the Radiology Results [address] at 8:54 pm on 11/23/2021to be communicated to a licensed caregiver. CT CHEST WO CONTRAST    Result Date: 11/24/2021  1. Basilar opacities, right greater than left, with central airway congestion and filling of the peripheral subsegmental bronchi in the right lower lobe. Overall these features are suggestive of chronic/recurrent aspiration. 2.  Hiatal hernia with diffuse mild esophageal dilatation with fluid, which may represent reflux or stasis. 3.  Emphysema. CT ABDOMEN PELVIS W IV CONTRAST Additional Contrast? None    Result Date: 11/23/2021  Moderate hiatal hernia with a fluid-filled patulous distal esophagus which is similar to prior comparison exams. Distended stomach with a normal caliber duodenum. Query possible gastroparesis or functional outlet obstruction. Possible findings of proctitis. Correlate clinically. Hepatomegaly with steatosis, unchanged. Findings in the dependent lung bases which are suspicious for sequelae of aspiration pneumonia in the appropriate clinical setting. XR CHEST PORTABLE    Result Date: 11/23/2021  Chronic lung changes with right basilar airspace opacity concerning for underlying pneumonia in the appropriate clinical setting. Correlate with aspiration risk. CTA HEAD NECK W CONTRAST    Result Date: 11/23/2021  No large vessel occlusion visualized within the head or neck.  Incidentally noted pulmonary emphysema and dilated, air and fluid-filled esophagus. MRI BRAIN WO CONTRAST    Result Date: 11/24/2021  1. No acute intracranial abnormality, specifically no evidence of acute infarct. 2. Minimal microvascular ischemic disease. FL MODIFIED BARIUM SWALLOW W VIDEO    Result Date: 11/26/2021  No laryngeal penetration or aspiration. Please see separate speech pathology report for full discussion of findings and recommendations. Physical Examination:        General appearance:  alert, cooperative and no distress  Mental Status:  oriented to person, place and time and normal affect  Lungs:  clear to auscultation bilaterally, normal effort  Heart:  regular rate and rhythm, no murmur  Abdomen:  soft, nontender, nondistended, normal bowel sounds, no masses, hepatomegaly, splenomegaly  Extremities:  no edema, redness, tenderness in the calves  Skin:  no gross lesions, rashes, induration    Assessment:        Hospital Problems           Last Modified POA    * (Principal) Expressive aphasia 11/24/2021 Yes    Hiatal hernia with GERD and esophagitis 11/25/2021 Yes    Depression 11/24/2021 Yes    Fever 11/24/2021 Yes    Gastric outlet obstruction 11/24/2021 Yes    Hypertension 11/24/2021 Yes    S/P Nissen fundoplication (without gastrostomy tube) procedure 11/24/2021 Yes          Plan:        1. Esophagitis with josefa-conway tear, gastritis s/p EGD 11/24. Schedule Viscous lidocaine. Advance diet as tolerated-she is doing better today  2. Toxic metabolic encephalopathy 2/2 Intravascular volume depleation. Stop IVF since pt is tolerating oral intake at this time. 3. Expressive aphasia: Resolved. Neurology consulted thought to be 2/2 above. MRI brain showed no acute process. Recommend outpt neurology evaluation for EEG. 4. Hyponatremia: stable. check morning labs, urine osm, urine sodium. 5. Hypokalemia: replace potssium  6. Recurrent aspiration pneumonia: ID following, continue merropenem until 11/30. 7. Distal esophageal dilatation with stasis and severely congested, inflamed mucosa: EGD biopsy results pending. GI recommends protonix 40 mg PO daily. Recommend outpatient GI evaluation with . 8. History of MDRO, ESBL + bacteria  9. Depression: continue Psych medications  10. History of Nissen fundoplication: Continue PPI  11. Sarah-Abrams tear: Continue PPI, antiemetics      Dispo:  Needs one more day of Meropenem then will discharge tomorrow.      Sari Oconnor DO  11/29/2021  1:42 PM

## 2021-11-30 VITALS
WEIGHT: 180.12 LBS | HEIGHT: 62 IN | BODY MASS INDEX: 33.15 KG/M2 | RESPIRATION RATE: 13 BRPM | TEMPERATURE: 97.9 F | DIASTOLIC BLOOD PRESSURE: 66 MMHG | SYSTOLIC BLOOD PRESSURE: 147 MMHG | HEART RATE: 85 BPM | OXYGEN SATURATION: 96 %

## 2021-11-30 PROCEDURE — 6370000000 HC RX 637 (ALT 250 FOR IP): Performed by: STUDENT IN AN ORGANIZED HEALTH CARE EDUCATION/TRAINING PROGRAM

## 2021-11-30 PROCEDURE — 6360000002 HC RX W HCPCS: Performed by: INTERNAL MEDICINE

## 2021-11-30 PROCEDURE — 97163 PT EVAL HIGH COMPLEX 45 MIN: CPT

## 2021-11-30 PROCEDURE — 99239 HOSP IP/OBS DSCHRG MGMT >30: CPT | Performed by: FAMILY MEDICINE

## 2021-11-30 PROCEDURE — 94640 AIRWAY INHALATION TREATMENT: CPT

## 2021-11-30 PROCEDURE — 6370000000 HC RX 637 (ALT 250 FOR IP): Performed by: FAMILY MEDICINE

## 2021-11-30 PROCEDURE — 6370000000 HC RX 637 (ALT 250 FOR IP): Performed by: INTERNAL MEDICINE

## 2021-11-30 PROCEDURE — 6360000002 HC RX W HCPCS: Performed by: NURSE PRACTITIONER

## 2021-11-30 PROCEDURE — 94761 N-INVAS EAR/PLS OXIMETRY MLT: CPT

## 2021-11-30 PROCEDURE — 95718 EEG PHYS/QHP 2-12 HR W/VEEG: CPT | Performed by: PSYCHIATRY & NEUROLOGY

## 2021-11-30 PROCEDURE — 94664 DEMO&/EVAL PT USE INHALER: CPT

## 2021-11-30 PROCEDURE — 2700000000 HC OXYGEN THERAPY PER DAY

## 2021-11-30 PROCEDURE — 2580000003 HC RX 258: Performed by: NURSE PRACTITIONER

## 2021-11-30 PROCEDURE — 97530 THERAPEUTIC ACTIVITIES: CPT

## 2021-11-30 PROCEDURE — 6370000000 HC RX 637 (ALT 250 FOR IP): Performed by: NURSE PRACTITIONER

## 2021-11-30 PROCEDURE — 2580000003 HC RX 258: Performed by: INTERNAL MEDICINE

## 2021-11-30 PROCEDURE — 95819 EEG AWAKE AND ASLEEP: CPT

## 2021-11-30 RX ORDER — BUSPIRONE HYDROCHLORIDE 10 MG/1
10 TABLET ORAL 2 TIMES DAILY
Refills: 0 | DISCHARGE
Start: 2021-11-30

## 2021-11-30 RX ORDER — GUAIFENESIN DEXTROMETHORPHAN HYDROBROMIDE ORAL SOLUTION 10; 100 MG/5ML; MG/5ML
10 SOLUTION ORAL EVERY 4 HOURS PRN
Status: ON HOLD | DISCHARGE
Start: 2021-11-30 | End: 2021-12-19 | Stop reason: SDUPTHER

## 2021-11-30 RX ORDER — OXYBUTYNIN CHLORIDE 5 MG/1
5 TABLET ORAL 2 TIMES DAILY PRN
Qty: 30 TABLET | Refills: 0 | Status: ON HOLD | DISCHARGE
Start: 2021-11-30 | End: 2021-12-19 | Stop reason: HOSPADM

## 2021-11-30 RX ORDER — LACTULOSE 10 G/15ML
20 SOLUTION ORAL ONCE
Status: COMPLETED | OUTPATIENT
Start: 2021-11-30 | End: 2021-11-30

## 2021-11-30 RX ORDER — CLONAZEPAM 0.5 MG/1
0.25 TABLET ORAL 2 TIMES DAILY PRN
Status: DISCONTINUED | OUTPATIENT
Start: 2021-11-30 | End: 2021-11-30 | Stop reason: HOSPADM

## 2021-11-30 RX ORDER — BISACODYL 10 MG
10 SUPPOSITORY, RECTAL RECTAL DAILY PRN
Status: DISCONTINUED | OUTPATIENT
Start: 2021-11-30 | End: 2021-11-30 | Stop reason: HOSPADM

## 2021-11-30 RX ADMIN — BISACODYL 10 MG: 10 SUPPOSITORY RECTAL at 11:08

## 2021-11-30 RX ADMIN — QUETIAPINE FUMARATE 25 MG: 25 TABLET ORAL at 19:48

## 2021-11-30 RX ADMIN — CLONAZEPAM 0.25 MG: 0.5 TABLET ORAL at 11:08

## 2021-11-30 RX ADMIN — LACTULOSE 20 G: 20 SOLUTION ORAL at 12:04

## 2021-11-30 RX ADMIN — SUCRALFATE 1 G: 1 TABLET ORAL at 07:51

## 2021-11-30 RX ADMIN — HYDROCODONE BITARTRATE AND ACETAMINOPHEN 1 TABLET: 5; 325 TABLET ORAL at 19:47

## 2021-11-30 RX ADMIN — ONDANSETRON 4 MG: 2 INJECTION INTRAMUSCULAR; INTRAVENOUS at 09:19

## 2021-11-30 RX ADMIN — TRAZODONE HYDROCHLORIDE 100 MG: 100 TABLET ORAL at 19:48

## 2021-11-30 RX ADMIN — MAGNESIUM HYDROXIDE 15 ML: 400 SUSPENSION ORAL at 07:53

## 2021-11-30 RX ADMIN — BUSPIRONE HYDROCHLORIDE 10 MG: 10 TABLET ORAL at 19:48

## 2021-11-30 RX ADMIN — METOCLOPRAMIDE 5 MG: 5 TABLET ORAL at 06:55

## 2021-11-30 RX ADMIN — QUETIAPINE FUMARATE 25 MG: 25 TABLET ORAL at 07:51

## 2021-11-30 RX ADMIN — ANTACID TABLETS 1000 MG: 500 TABLET, CHEWABLE ORAL at 14:43

## 2021-11-30 RX ADMIN — ATORVASTATIN CALCIUM 40 MG: 80 TABLET, FILM COATED ORAL at 07:51

## 2021-11-30 RX ADMIN — POLYETHYLENE GLYCOL 3350 17 G: 17 POWDER, FOR SOLUTION ORAL at 07:52

## 2021-11-30 RX ADMIN — ESCITALOPRAM OXALATE 10 MG: 10 TABLET ORAL at 07:52

## 2021-11-30 RX ADMIN — MEROPENEM 1000 MG: 1 INJECTION, POWDER, FOR SOLUTION INTRAVENOUS at 11:08

## 2021-11-30 RX ADMIN — BUSPIRONE HYDROCHLORIDE 10 MG: 10 TABLET ORAL at 07:52

## 2021-11-30 RX ADMIN — METOCLOPRAMIDE 5 MG: 5 TABLET ORAL at 12:03

## 2021-11-30 RX ADMIN — SUCRALFATE 1 G: 1 TABLET ORAL at 12:02

## 2021-11-30 RX ADMIN — GUAIFENESIN DEXTROMETHORPHAN HYDROBROMIDE ORAL SOLUTION 10 ML: 200; 20 SOLUTION ORAL at 10:20

## 2021-11-30 RX ADMIN — CLONAZEPAM 0.25 MG: 0.5 TABLET ORAL at 19:56

## 2021-11-30 RX ADMIN — LORAZEPAM 1 MG: 0.5 TABLET ORAL at 00:37

## 2021-11-30 RX ADMIN — AMITRIPTYLINE HYDROCHLORIDE 25 MG: 25 TABLET, FILM COATED ORAL at 07:51

## 2021-11-30 RX ADMIN — METOCLOPRAMIDE 5 MG: 5 TABLET ORAL at 16:20

## 2021-11-30 RX ADMIN — SODIUM CHLORIDE, PRESERVATIVE FREE 10 ML: 5 INJECTION INTRAVENOUS at 07:53

## 2021-11-30 RX ADMIN — TAMSULOSIN HYDROCHLORIDE 0.4 MG: 0.4 CAPSULE ORAL at 07:51

## 2021-11-30 RX ADMIN — PANTOPRAZOLE SODIUM 40 MG: 40 TABLET, DELAYED RELEASE ORAL at 16:20

## 2021-11-30 RX ADMIN — HYDROCODONE BITARTRATE AND ACETAMINOPHEN 1 TABLET: 5; 325 TABLET ORAL at 07:52

## 2021-11-30 RX ADMIN — MORPHINE SULFATE 2 MG: 4 INJECTION, SOLUTION INTRAMUSCULAR; INTRAVENOUS at 02:31

## 2021-11-30 RX ADMIN — AMITRIPTYLINE HYDROCHLORIDE 25 MG: 25 TABLET, FILM COATED ORAL at 19:48

## 2021-11-30 RX ADMIN — SUCRALFATE 1 G: 1 TABLET ORAL at 19:48

## 2021-11-30 RX ADMIN — PANTOPRAZOLE SODIUM 40 MG: 40 TABLET, DELAYED RELEASE ORAL at 06:55

## 2021-11-30 RX ADMIN — SUCRALFATE 1 G: 1 TABLET ORAL at 16:21

## 2021-11-30 RX ADMIN — ANTACID TABLETS 1000 MG: 500 TABLET, CHEWABLE ORAL at 08:09

## 2021-11-30 RX ADMIN — LORAZEPAM 1 MG: 0.5 TABLET ORAL at 08:25

## 2021-11-30 RX ADMIN — LIDOCAINE HYDROCHLORIDE 15 ML: 20 SOLUTION ORAL; TOPICAL at 12:03

## 2021-11-30 RX ADMIN — BUDESONIDE AND FORMOTEROL FUMARATE DIHYDRATE 2 PUFF: 160; 4.5 AEROSOL RESPIRATORY (INHALATION) at 08:39

## 2021-11-30 RX ADMIN — MEROPENEM 1000 MG: 1 INJECTION, POWDER, FOR SOLUTION INTRAVENOUS at 02:31

## 2021-11-30 RX ADMIN — ENOXAPARIN SODIUM 40 MG: 40 INJECTION SUBCUTANEOUS at 07:52

## 2021-11-30 ASSESSMENT — ENCOUNTER SYMPTOMS
COUGH: 0
DIARRHEA: 0
VOMITING: 0
BLOOD IN STOOL: 0
CONSTIPATION: 0
CHEST TIGHTNESS: 0
ABDOMINAL PAIN: 0
NAUSEA: 0
SHORTNESS OF BREATH: 0
RHINORRHEA: 0
WHEEZING: 0

## 2021-11-30 ASSESSMENT — PAIN SCALES - GENERAL
PAINLEVEL_OUTOF10: 5
PAINLEVEL_OUTOF10: 6
PAINLEVEL_OUTOF10: 5
PAINLEVEL_OUTOF10: 3
PAINLEVEL_OUTOF10: 5
PAINLEVEL_OUTOF10: 6
PAINLEVEL_OUTOF10: 4

## 2021-11-30 ASSESSMENT — PAIN DESCRIPTION - FREQUENCY: FREQUENCY: CONTINUOUS

## 2021-11-30 ASSESSMENT — PAIN DESCRIPTION - LOCATION: LOCATION: ABDOMEN

## 2021-11-30 ASSESSMENT — PAIN DESCRIPTION - PAIN TYPE
TYPE: ACUTE PAIN
TYPE: CHRONIC PAIN

## 2021-11-30 ASSESSMENT — PAIN DESCRIPTION - DESCRIPTORS: DESCRIPTORS: CRAMPING

## 2021-11-30 NOTE — PROGRESS NOTES
Physical Therapy    Facility/Department: [facility] 4A STEPDOWN  Initial Assessment    NAME: Myles Kumari  : 1945  MRN: [id number]    Chief Complaint   Patient presents with    Abdominal Pain    Altered Mental Status       Date of Service: 2021    Discharge Recommendations:    Further therapy recommended at discharge. PT Equipment Recommendations  Other: CTA    Assessment   Body structures, Functions, Activity limitations: Decreased functional mobility ; Decreased strength; Decreased endurance; Decreased balance  Assessment: Pt grossly Michael to CGA, modA amb 2' face to face assist, blocking BLEs when in stance for gait needed for safety. Pt would benefit from continued acute PT to address deficits. Prognosis: Good  Decision Making: Medium Complexity  PT Education: Plan of Care; PT Role; General Safety; Transfer Training; Functional Mobility Training; Gait Training  REQUIRES PT FOLLOW UP: Yes  Activity Tolerance  Activity Tolerance: Patient Tolerated treatment well; Patient limited by fatigue; Patient limited by endurance       Patient Diagnosis(es): The primary encounter diagnosis was Lower abdominal pain. Diagnoses of Altered mental status, unspecified altered mental status type and Expressive aphasia were also pertinent to this visit. has a past medical history of Anxiety, Arthritis, Back pain, Bronchitis, Caffeine use, Carpal tunnel syndrome, Cataracts, bilateral, Constipation, Depression, Diarrhea, GERD (gastroesophageal reflux disease), H/O gastric ulcer, Hyperlipidemia, Hypertension, Mumps, MVP (mitral valve prolapse), Recurrent UTI, Sinusitis, Tracheostomy in place [facility]), Ulcerative esophagitis, and Wellness examination. has a past surgical history that includes Hysterectomy; total nephrectomy; Tonsillectomy; Appendectomy; Cystoscopy; Ovary removal; Tubal ligation; rhinoplasty; Carpal tunnel release; Hand surgery; Total hip arthroplasty; eye surgery;  Colonoscopy; joint replacement (Right); Gastric fundoplication (N/A, 60/26/4215); hc cath power picc triple (03/04/2020); Upper gastrointestinal endoscopy (N/A, 03/17/2020); tracheostomy (N/A, 03/27/2020); Gastrostomy tube placement (N/A, 03/27/2020); tracheostomy (N/A, 04/02/2020); Upper gastrointestinal endoscopy (N/A, 05/18/2020); Upper gastrointestinal endoscopy (05/18/2020); ERCP (05/21/2020); ERCP (05/21/2020); ERCP (05/21/2020); ERCP (05/21/2020); Cholecystectomy, laparoscopic (N/A, 05/22/2020); Upper gastrointestinal endoscopy (N/A, 07/06/2020); Upper gastrointestinal endoscopy (N/A, 11/09/2020); Upper gastrointestinal endoscopy (02/08/2021); Gastrostomy tube placement (N/A, 2/8/2021); ERCP (N/A, 2/8/2021); ERCP (2/8/2021); and Upper gastrointestinal endoscopy (N/A, 11/24/2021). Restrictions  Restrictions/Precautions  Restrictions/Precautions: General Precautions, Fall Risk, Up as Tolerated  Required Braces or Orthoses?: No  Position Activity Restriction  Other position/activity restrictions: up with assist.  Vision/Hearing  Vision: Impaired  Vision Exceptions: Wears glasses for reading; Wears glasses for distance (has bifocals but doesn't use)  Hearing: Exceptions to [facility]  Hearing Exceptions: Hard of hearing/hearing concerns (\"a little bit\")     Subjective  General  Chart Reviewed: Yes  Patient assessed for rehabilitation services?: Yes  Response To Previous Treatment: Not applicable  Follows Commands: Within Functional Limits  Subjective  Subjective: RN and pt agreeable to PT. Pt alert in bed upon arrival.  Pain Screening  Patient Currently in Pain: Yes  Pain Assessment  Pain Assessment: 0-10  Pain Level: 5  Pain Type: Chronic pain  Pain Location: Abdomen; Knee; Chest  Non-Pharmaceutical Pain Intervention(s): Ambulation/Increased Activity; Distraction;  Emotional support  Response to Pain Intervention: Patient Satisfied  Vital Signs  Patient Currently in Pain: Yes  Pre Treatment Pain Screening  Intervention List: Patient able to continue with treatment       Social/Functional History  Social/Functional History  Type of Home: Facility ([employer], ~1.5 years)  Bathroom Shower/Tub: Walk-in shower  Bathroom Toilet: Standard  Bathroom Equipment: Built-in shower seat, Toilet raiser, Grab bars around toilet, Hand-held shower  Bathroom Accessibility: Accessible  Home Equipment: [address] Help From: Home health  ADL Assistance:  (aide helps wash legs/ back. help getting to shower chair. help with LE dressing as well.)  Homemaking Assistance: Needs assistance  Homemaking Responsibilities: No  Ambulation Assistance: Needs assistance (Pt can navigate w/c in room but assist needed to navigate facility. Eating in room.)  Transfer Assistance: Independent (pivots to w/c with w/c faceing pt EOB withotu assist. assist when getting to shower chair to bathe)  Leisure & Hobbies: bingo, cards,    Objective          AROM RLE (degrees)  RLE AROM: WFL  AROM LLE (degrees)  LLE AROM : WFL  AROM RUE (degrees)  RUE AROM : WFL  AROM LUE (degrees)  LUE AROM : WFL  Strength RLE  Comment: 4- grossly  Strength LLE  Comment: 4- grossly  Strength RUE  Comment: 3+ grossly  Strength LUE  Comment: 3+ grossly     Sensation  Overall Sensation Status: WFL (Pt denies any numbness or tingling)  Bed mobility  Supine to Sit: Contact guard assistance  Sit to Supine:  (left in chair)  Scooting: Contact guard assistance  Transfers  Sit to Stand: Minimal Assistance (face to face, performed 3x total)  Stand to sit: Minimal Assistance (face to face, performed 3x total.)  Ambulation  Ambulation?: Yes  Ambulation 1  Surface: level tile  Device:  (face to face)  Assistance: Minimal assistance;  Moderate assistance (variable, one person)  Quality of Gait: cues to sequence stepping, blocking stance LE each time, slowed, small steps, unsteady,  Distance: 2'  Stairs/Curb  Stairs?: No     Balance  Posture: Fair  Sitting - Static: Good; -  Sitting - Dynamic: Fair; +  Standing - Static: Fair  Standing - Dynamic: Fair; -  Comments: face to face assist used while assessing standing balance  Exercises  Comments: EOB ~ 15min total. CGA to SBA EOB, intermittantly leaning on L elbow on raised HOB d/t fatigue. dynamic activities EOB. first transfer, pt c/o weakness and dizziness. BP WFL, dizziness subsided. 2nd attempt pt c/o LE weakness and nuasea. RN addressed with medication, neusea subsided. Steps to chair as described, nausea returned then subsided on it's own after seated in chair. Plan   Plan  Times per week: 5-6x/wk  Current Treatment Recommendations: Strengthening, Balance Training, Endurance Training, Functional Mobility Training, Transfer Training, Gait Training, Wheelchair Mobility Training, Home Exercise Program, Safety Education & Training, Patient/Caregiver Education & Training, Equipment Evaluation, Education, & procurement  Safety Devices  Type of devices: Call light within reach, Nurse notified, Gait belt, Patient at risk for falls, All fall risk precautions in place, Left in chair  Restraints  Initially in place: No    AM-PAC Score     AM-PAC Inpatient Mobility without Stair Climbing Raw Score : 11 (11/30/21 1330)  AM-PAC Inpatient without Stair Climbing T-Scale Score : 35.66 (11/30/21 1330)  Mobility Inpatient CMS 0-100% Score: 67.36 (11/30/21 1330)  Mobility Inpatient without Stair CMS G-Code Modifier : CL (11/30/21 1330)       Goals  Short term goals  Time Frame for Short term goals: 14 visits  Short term goal 1: Pt will be Carleen bed mobility  Short term goal 2: Pt will be Carleen pivot transfers  Short term goal 3: Pt will be Carleen propelling w/c 100'  Short term goal 4: Pt will be CGA amb 10' RW.        Therapy Time   Individual Concurrent Group Co-treatment   Time In 5214         Time Out 0948         Minutes 51            Timed Code Treatment Minutes: [address], PT

## 2021-11-30 NOTE — PROCEDURES
EEG REPORT       Patient: Collette Connor Age: 68 y.o. MRN: [id number]    Date: 11/23/2021  Referring Provider: No ref. provider found    History: This routine 3 hour 16 minute scalp EEG was recorded with video- monitoring for a 68 y.o. Sherryle Moder female  who presented with encephalopathy. This EEG was performed to evaluate for focal and epileptiform abnormalities.      Mary Ramirez   Current Facility-Administered Medications   Medication Dose Route Frequency Provider Last Rate Last Admin    clonazePAM (KLONOPIN) tablet 0.25 mg  0.25 mg Oral BID PRN Kennis Osler, MD   0.25 mg at 11/30/21 1108    bisacodyl (DULCOLAX) suppository 10 mg  10 mg Rectal Daily PRN Kennis Osler, MD   10 mg at 11/30/21 1108    enoxaparin (LOVENOX) injection 40 mg  40 mg SubCUTAneous Daily MARGOTH Medel - CNP   40 mg at 11/30/21 0752    dextromethorphan-guaiFENesin (ROBITUSSIN-DM)  MG/5ML liquid 10 mL  10 mL Oral Q4H PRN Mohammad I Mashaleh, DO   10 mL at 11/30/21 1020    lidocaine viscous hcl (XYLOCAINE) 2 % solution 15 mL  15 mL Mouth/Throat TID AC Mohammad I Mashaleh, DO   15 mL at 11/30/21 1203    amitriptyline (ELAVIL) tablet 25 mg  25 mg Oral BID Mohammad I Mashaleh, DO   25 mg at 11/30/21 0751    busPIRone (BUSPAR) tablet 10 mg  10 mg Oral BID Mohammad I Mashaleh, DO   10 mg at 11/30/21 0752    escitalopram (LEXAPRO) tablet 10 mg  10 mg Oral Daily Mohammad I Mashaleh, DO   10 mg at 11/30/21 0752    HYDROcodone-acetaminophen (NORCO) 5-325 MG per tablet 1 tablet  1 tablet Oral BID Eula Moment, DO   1 tablet at 11/30/21 0752    oxybutynin (DITROPAN) tablet 5 mg  5 mg Oral BID PRN MARGOTH Alcala - CNP   5 mg at 11/29/21 0811    pantoprazole (PROTONIX) tablet 40 mg  40 mg Oral BID AC Laura Koroma MD   40 mg at 11/30/21 0655    calcium carbonate (TUMS) chewable tablet 1,000 mg  1,000 mg Oral TID PRN Laura Koroma MD   1,000 mg at 11/30/21 1443    ondansetron (ZOFRAN) injection 4 mg  4 mg IntraVENous Q6H PRN Jarad Ulloaanita, APRN - CNP   4 mg at 11/30/21 0919    sodium chloride flush 0.9 % injection 5-40 mL  5-40 mL IntraVENous 2 times per day Skip Vora, APRN - CNP   10 mL at 11/30/21 0753    sodium chloride flush 0.9 % injection 5-40 mL  5-40 mL IntraVENous PRN Skip Vora, APRN - CNP   10 mL at 11/26/21 0902    0.9 % sodium chloride infusion  25 mL IntraVENous PRN Skip Vora, APRN - CNP        LORazepam (ATIVAN) tablet 1 mg  1 mg Oral Q6H PRN Cindy Levine MD   1 mg at 11/30/21 0825    budesonide-formoterol (SYMBICORT) 160-4.5 MCG/ACT inhaler 2 puff  2 puff Inhalation BID Cindy Levine MD   2 puff at 11/30/21 0839    magnesium hydroxide (MILK OF MAGNESIA) 400 MG/5ML suspension 15 mL  15 mL Oral Daily PRN Cindy Levine MD   15 mL at 11/30/21 0753    metoclopramide (REGLAN) tablet 5 mg  5 mg Oral TID AC Cindy Levine MD   5 mg at 11/30/21 1203    polyethylene glycol (GLYCOLAX) packet 17 g  17 g Oral Every Other Day Cindy Levine MD   17 g at 11/30/21 0752    QUEtiapine (SEROQUEL) tablet 25 mg  25 mg Oral BID Cindy Levine MD   25 mg at 11/30/21 0751    atorvastatin (LIPITOR) tablet 40 mg  40 mg Oral Daily Cindy Levine MD   40 mg at 11/30/21 0751    sucralfate (CARAFATE) tablet 1 g  1 g Oral 4x Daily Sancho Finney APRN - CNP   1 g at 11/30/21 1202    traZODone (DESYREL) tablet 100 mg  100 mg Oral Nightly Cindy Levine MD   100 mg at 11/29/21 2039    morphine injection 2 mg  2 mg IntraVENous Q3H PRN Cindy Levine MD   2 mg at 11/30/21 0231    tamsulosin (FLOMAX) capsule 0.4 mg  0.4 mg Oral Daily Cindy Levine MD   0.4 mg at 11/30/21 8218        Technical Description: This is a 21 channel digital EEG recording with time-locked video. Electrodes were placed in accordance with the 10-20 International System of Electrode Placement. Single lead EKG monitoring as well as temporal electrodes were included.     The patient was not sleep deprived. This recording was obtained during wakefulness. EEG Description: The dominant background activity during maximal recorded wakefulness consisted of bioccipitally dominant 9-10 Hz, 25-35 uV symmetric, regular activity that was reactive to eye opening. During drowsiness, the background rhythm waxed and waned and there were periods of slowing. Rudimentary sleep spindles were seen in stage II sleep. Photic stimulation - stepwise photic stimulation at 2-30 Hz was performed and there was no driving response. Hyperventilation - was not preformed. No abnormalities were activated by photic stimulation     The EKG channel demonstrated a normal sinus rhythm. Interpretation  This EEG was normal in wakefulness and sleep. Clinical correlation  This EEG was a normal 3-hour 16 minutes EEG. No focal or epileptiform abnormalities were seen. Álvaro Maharaj MD, ROMA  PGY-4 Neurology   11/30/2021 at 3:00 PM      I have personally reviewed the EEG of Magui Bills. I agree with the documentation by the resident with changes made to the note as needed. Shreyas Arechiga MD  Neurology    This note is created with the assistance of a speech-recognition program. While intending to generate a document that actually reflects the content of the visit, the document can still have some errors including those of syntax and sound a- like substitutions which may escape proofreading. In such instances, actual meaning can be extrapolated by contextual derivation.

## 2021-11-30 NOTE — PROGRESS NOTES
Comprehensive Nutrition Assessment    Type and Reason for Visit:  Initial (LOS)    Nutrition Recommendations/Plan: Continue current diet. Will provide frozen Magic Cup oral supplements with meals. Encourage/monitor PO intakes as tolerated. Will monitor labs, weights, and plan of care. Nutrition Assessment:  Chart reviewed/pt seen based on length of stay. Admitted with AMS, intermittent aphasia, and abdominal pain. Pt reports having a decreased appetite and eating minimally x past 3 weeks. Pt also reports 6 lb weight loss x past few weeks. PMH includes: HTN, GERD, h/o gastric outlet obstruction, h/o Nissen fundoplication (without gastrostomy tube). S/p MBSS completed on 11/26 - pt passed for a regular diet with thin liquids. Pt reports having abdominal distention/discomfort but has been taking some of her meals. Will provide oral supplements to boost intakes. Weight history reviewed - per chart, weight loss of 6.7% x 11 months (not significant). Last BM 11/23 - pt with c/o constipation. Labs reviewed. Meds reviewed: Reglan, Glycolax, Zofran PRN. Malnutrition Assessment:  Malnutrition Status: At risk for malnutrition    Context:  Chronic Illness     Findings of the 6 clinical characteristics of malnutrition:  Energy Intake:  7 - 75% or less estimated energy requirements for 1 month or longer - Predicted h/o poor PO intakes.   Weight Loss:  No significant weight loss     Body Fat Loss:  No significant body fat loss   Muscle Mass Loss:  No significant muscle mass loss  Fluid Accumulation:  No significant fluid accumulation   Strength:  Not Performed    Estimated Daily Nutrient Needs:  Energy (kcal):  20-22 kcal/kg = 7130-9628 kcals/day; Weight Used for Energy Requirements:  Current     Protein (g):  1.5 gm/kg = 75 gm pro/day; Weight Used for Protein Requirements:  Ideal        Fluid (ml/day):  25 mL/kg = 2000 mL/day or per MD; Method Used for Fluid Requirements:  ml/Kg      Nutrition Related Findings:  Labs/Meds reviewed. Distended abdomen. C/o nausea and constipation. Last BM 11/23. Wounds:  None       Current Nutrition Therapies:    ADULT DIET; Regular    Anthropometric Measures:  · Height: 5' 2\" (157.5 cm)  · Current Body Weight: 180 lb 1.9 oz (81.7 kg)   · Admission Body Weight: 184 lb 8.4 oz (83.7 kg)    · Usual Body Weight: 193 lb (87.5 kg) (12/13/20 per chart review)     · Ideal Body Weight: 110 lbs; % Ideal Body Weight 163.7 %   · BMI: 32.9  · BMI Categories: Obese Class 1 (BMI 30.0-34. 9)       Nutrition Diagnosis:   · Inadequate oral intake related to  (current medical condition; appetite; abdominal distention/pain) as evidenced by poor intake prior to admission, intake 26-50%, intake 51-75% (variable PO intakes; need for ONS)    Nutrition Interventions:   Food and/or Nutrient Delivery:  Continue Current Diet, Start Oral Nutrition Supplement  Nutrition Education/Counseling:  No recommendation at this time   Coordination of Nutrition Care:  Continue to monitor while inpatient    Goals:  Oral intakes to meet at least 75% of estimated nutrition needs. Nutrition Monitoring and Evaluation:   Behavioral-Environmental Outcomes:  None Identified   Food/Nutrient Intake Outcomes:  Food and Nutrient Intake, Supplement Intake  Physical Signs/Symptoms Outcomes:  Biochemical Data, GI Status, Constipation, Nutrition Focused Physical Findings, Skin, Weight, Fluid Status or Edema, Hemodynamic Status     Discharge Planning:     Too soon to determine     Electronically signed by Christiano Valle RD, LD on 11/30/21 at 4:00 PM EST    Contact: [phone]

## 2021-11-30 NOTE — CARE COORDINATION
11/28/21 1040   IMM Letter   IMM Letter date given: 11/24/21   IMM Letter time given: 0211
Transitional Planning    Per rounds, pt may be able to discharge today. Spoke with Summer Garner at [facility], pt is able to return today. 200 Dr. Adithya Soto PS to inquire about expedited d/c order    66 65 76 d/c order placed. Transport requested    1500 Transport ETA 2100 via [employer]. [address] and left message. [address] with [employer]. Updated him of transport time      # for Report [phone]          Discharge [facility] Department  Written by:  Shelby Mckinley RN    Patient Name: Francis Hinton  Attending Provider: Ricky Roberts MD  Admit Date: 2021  8:12 PM  MRN: [id number]  Account: [de-identified]                     : 1945  Discharge Date: 21      Disposition: [facility]    Shelby Mckinley RN
accept pt back tonight.           Electronically signed by Faviola Goins RN on 11/25/21 at 11:10 AM EST

## 2021-11-30 NOTE — PLAN OF CARE
Problem: Falls - Risk of:  Goal: Will remain free from falls  Description: Will remain free from falls  Outcome: Ongoing  Goal: Absence of physical injury  Description: Absence of physical injury  Outcome: Ongoing     Problem: Skin Integrity:  Goal: Will show no infection signs and symptoms  Description: Will show no infection signs and symptoms  Outcome: Ongoing  Goal: Absence of new skin breakdown  Description: Absence of new skin breakdown  Outcome: Ongoing     Problem: Anxiety:  Goal: Level of anxiety will decrease  Description: Level of anxiety will decrease  Outcome: Ongoing     Problem: Infection:  Goal: Will remain free from infection  Description: Will remain free from infection  Outcome: Ongoing     Problem: Safety:  Goal: Free from accidental physical injury  Description: Free from accidental physical injury  Outcome: Ongoing  Goal: Free from intentional harm  Description: Free from intentional harm  Outcome: Ongoing     Problem: Daily Care:  Goal: Daily care needs are met  Description: Daily care needs are met  Outcome: Ongoing     Problem: Pain:  Goal: Patient's pain/discomfort is manageable  Description: Patient's pain/discomfort is manageable  Outcome: Ongoing  Goal: Pain level will decrease  Description: Pain level will decrease  Outcome: Ongoing  Goal: Control of acute pain  Description: Control of acute pain  Outcome: Ongoing  Goal: Control of chronic pain  Description: Control of chronic pain  Outcome: Ongoing     Problem: Skin Integrity:  Goal: Skin integrity will stabilize  Description: Skin integrity will stabilize  Outcome: Ongoing     Problem: Discharge Planning:  Goal: Patients continuum of care needs are met  Description: Patients continuum of care needs are met  Outcome: Ongoing

## 2021-11-30 NOTE — PROGRESS NOTES
[facility]  Office: [address], DO, Stanley Sanchez, DO, Kristie Concepcion, DO, John Morton [facility], DO, Marie Archuleta MD, Lona Wakefield MD, Kennis Osler, MD, Kimi Crowe MD, Janay Alejandro MD, Ana Abarca MD, Jasmin Chacon MD, Leti Helton, DO, Reina Tipton, DO, Laura Koroma MD,  Saud Bruce DO, Aryan Sotomayor MD, Brandt Hdz MD, Sina Elmore MD, Bee Stark MD, Elmira Truong MD, Damaso Dennis MD, Deep Ulloa MD, Jeana Cruz [facility], [facility], CNP, Lisbeth Sandoval, CNP, Edwin Bolden, CNS, Kyree Madrid, CNP, Angel Tinajero, CNP, Suzanne Albrecht, CNP, Rigoberto Liu, CNP, Telly Delatorre, CNP, Robert Max PA-C, Wyatt Yi, [facility], Christy Terrazas, CNP, Alf Phillips, CNP, Masha Cortez, CNP, Linda Osuna, CNP, Kaylee Bustamante, CNP, Doug Silva, CNP, Aniyah August, [address] [facility]      Daily Progress Note     Admit Date: 11/23/2021  Bed/Room No.  1758/9609-28  Admitting Physician : Kennis Osler, MD  Code Status :Full Code  Hospital Day:  LOS: 7 days   Chief Complaint:     Chief Complaint   Patient presents with    Abdominal Pain    Altered Mental Status     Principal Problem:    Expressive aphasia  Active Problems:    Hiatal hernia with GERD and esophagitis    Depression    Fever    Gastric outlet obstruction    Hypertension    S/P Nissen fundoplication (without gastrostomy tube) procedure  Resolved Problems:    * No resolved hospital problems. *    Subjective : Interval History/Significant events :  11/30/21    Patient has been complaining of abdominal discomfort and distention. She has been constipated with last bowel movement 8 days before. Patient remains seizure-free. She denies any focal weakness. Breathing is stable on 3 L of oxygen by nasal cannula. Vitals - Stable afebrile  Labs -no new labs. Recent lab work shows normal electrolytes, kidney function. Nursing notes , Consults notes reviewed.  Overnight events and updates discussed with Nursing staff . Background History:         Brandan Camarena is 68 y.o. female  Who was admitted to the hospital on 11/23/2021 for treatment of Expressive aphasia. Patient presented with abdominal pain, altered mental status. Patient underwent EGD on 11/24/2021 showing tortuous esophagus with dilated distal third without stricture or stenosis, Sarah-Abrams tear reflux esophagitis, gastritis in fundus, proximal gastric body. CT head was normal.  Neurology was consulted and recommended EEG /LTME for possible seizure episode. MRI brain was negative for any acute intracranial process. PMH:  Past Medical History:   Diagnosis Date    Anxiety     Arthritis     Back pain     Bronchitis     Caffeine use     8 coffee / day    Carpal tunnel syndrome     Cataracts, bilateral     Constipation     Depression     Diarrhea     GERD (gastroesophageal reflux disease)     H/O gastric ulcer     Hyperlipidemia     Hypertension     Mumps     MVP (mitral valve prolapse)     Dr. Zaira Padron in May 2019    Recurrent UTI     Dr. Luisa López    Sinusitis     Tracheostomy in place [facility])     Ulcerative esophagitis     Wellness examination     Dr. Coolidge Lundborg seen in Jan 2020      Allergies:    Allergies   Allergen Reactions    Prednisone Anxiety    Compazine [Prochlorperazine Maleate]     Penicillin V Potassium     Penicillins Other (See Comments)     Shock- received meropenem and ceftriaxone wo issues 2020      Medications :  meropenem, 1,000 mg, IntraVENous, Q8H  enoxaparin, 40 mg, SubCUTAneous, Daily  lidocaine viscous hcl, 15 mL, Mouth/Throat, TID AC  amitriptyline, 25 mg, Oral, BID  busPIRone, 10 mg, Oral, BID  escitalopram, 10 mg, Oral, Daily  HYDROcodone 5 mg - acetaminophen, 1 tablet, Oral, BID  pantoprazole, 40 mg, Oral, BID AC  sodium chloride flush, 5-40 mL, IntraVENous, 2 times per day  budesonide-formoterol, 2 puff, Inhalation, BID  metoclopramide, 5 mg, Oral, TID AC  polyethylene glycol, 17 g, Oral, Every Other Day  QUEtiapine, 25 mg, Oral, BID  atorvastatin, 40 mg, Oral, Daily  sucralfate, 1 g, Oral, 4x Daily  traZODone, 100 mg, Oral, Nightly  tamsulosin, 0.4 mg, Oral, Daily        Review of Systems   Review of Systems   Constitutional: Negative for appetite change, fatigue, fever and unexpected weight change. HENT: Negative for congestion, rhinorrhea and sneezing. Eyes: Negative for visual disturbance. Respiratory: Negative for cough, chest tightness, shortness of breath and wheezing. Cardiovascular: Negative for chest pain and palpitations. Gastrointestinal: Negative for abdominal pain, blood in stool, constipation, diarrhea, nausea and vomiting. Genitourinary: Negative for dysuria, enuresis, frequency and hematuria. Musculoskeletal: Negative for arthralgias and myalgias. Skin: Negative for rash. Neurological: Negative for dizziness, weakness, light-headedness and headaches. Hematological: Does not bruise/bleed easily. Psychiatric/Behavioral: Negative for dysphoric mood and sleep disturbance. Objective :      Current Vitals : Temp: 98.6 °F (37 °C),  Pulse: 79, Resp: 14, BP: 129/63, SpO2: 91 %  Last 24 Hrs Vitals   Patient Vitals for the past 24 hrs:   BP Temp Temp src Pulse Resp SpO2 Weight   11/30/21 0839 -- -- -- -- -- 91 % --   11/30/21 0730 129/63 98.6 °F (37 °C) Oral 79 14 91 % --   11/30/21 0335 (!) 92/52 97.5 °F (36.4 °C) Temporal 68 17 92 % 180 lb 1.9 oz (81.7 kg)   11/30/21 0038 105/64 97.9 °F (36.6 °C) Temporal 77 13 96 % --   11/29/21 1939 108/63 99.1 °F (37.3 °C) Temporal 92 19 92 % --   11/29/21 1130 (!) 98/56 97.3 °F (36.3 °C) Oral 76 20 96 % --     Intake / output   11/29 0701 - 11/30 0700  In: 845 [P.O.:450; I.V.:395]  Out: 800 [Urine:800]  Physical Exam:  Physical Exam  Vitals and nursing note reviewed. Constitutional:       General: She is not in acute distress. Appearance: She is not diaphoretic.    HENT:      Head: Normocephalic and atraumatic. Nose:      Right Sinus: No maxillary sinus tenderness or frontal sinus tenderness. Left Sinus: No maxillary sinus tenderness or frontal sinus tenderness. Mouth/Throat:      Pharynx: No oropharyngeal exudate. Eyes:      General: No scleral icterus. Conjunctiva/sclera: Conjunctivae normal.      Pupils: Pupils are equal, round, and reactive to light. Neck:      Thyroid: No thyromegaly. Vascular: No JVD. Cardiovascular:      Rate and Rhythm: Normal rate and regular rhythm. Pulses:           Dorsalis pedis pulses are 2+ on the right side and 2+ on the left side. Heart sounds: Normal heart sounds. No murmur heard. Pulmonary:      Effort: Pulmonary effort is normal.      Breath sounds: Normal breath sounds. No wheezing or rales. Abdominal:      Palpations: Abdomen is soft. There is no mass. Tenderness: There is no abdominal tenderness. Musculoskeletal:      Cervical back: Full passive range of motion without pain and neck supple. Lymphadenopathy:      Head:      Right side of head: No submandibular adenopathy. Left side of head: No submandibular adenopathy. Cervical: No cervical adenopathy. Skin:     General: Skin is warm. Neurological:      Mental Status: She is alert and oriented to person, place, and time. Motor: No tremor. Psychiatric:         Behavior: Behavior is cooperative.            Laboratory findings:    Recent Labs     11/27/21  1802 11/28/21  0837 11/29/21  0553   WBC 4.8 3.9 3.7   HGB 11.5* 12.4 12.0   HCT 37.6 38.9 38.1    189 176     Recent Labs     11/27/21  1802 11/28/21  0837 11/29/21  0553    134* 132*   K 3.8 4.0 4.3    100 98   CO2 17* 25 25   GLUCOSE 104* 107* 98   BUN 7* 6* 9   CREATININE 0.76 0.69 0.76   MG 2.1 1.8 1.9   CALCIUM 9.0 9.0 9.3   PHOS 2.7 2.5* 3.3     Recent Labs     11/27/21  1802 11/28/21  0837 11/29/21  0553   LABALBU 2.9* 3.4* 2.9*          Specific Gravity, UA   Date Value Ref Range Status   11/23/2021 1.053 (H) 1.005 - 1.030 Final     Protein, UA   Date Value Ref Range Status   11/23/2021 NEGATIVE NEGATIVE Final     RBC, UA   Date Value Ref Range Status   11/23/2021 2 TO 5 0 - 4 /HPF Final     Comment:     Reference range defined for non-centrifuged specimen. Blood, UA POC   Date Value Ref Range Status   06/29/2018 Non-hemolyzed trace  Final     Bacteria, UA   Date Value Ref Range Status   11/23/2021 NOT REPORTED None Final     Nitrite, Urine   Date Value Ref Range Status   11/23/2021 NEGATIVE NEGATIVE Final     WBC, UA   Date Value Ref Range Status   11/23/2021 None 0 - 5 /HPF Final     Leukocyte Esterase, Urine   Date Value Ref Range Status   11/23/2021 NEGATIVE NEGATIVE Final       Imaging / Clinical Data :-   CT Head WO Contrast    Result Date: 11/23/2021  No CT evidence of an acute infarct. The findings were sent to the Radiology Results [address] at 8:54 pm on 11/23/2021to be communicated to a licensed caregiver. CT CHEST WO CONTRAST    Result Date: 11/24/2021  1. Basilar opacities, right greater than left, with central airway congestion and filling of the peripheral subsegmental bronchi in the right lower lobe. Overall these features are suggestive of chronic/recurrent aspiration. 2.  Hiatal hernia with diffuse mild esophageal dilatation with fluid, which may represent reflux or stasis. 3.  Emphysema. CT ABDOMEN PELVIS W IV CONTRAST Additional Contrast? None    Result Date: 11/23/2021  Moderate hiatal hernia with a fluid-filled patulous distal esophagus which is similar to prior comparison exams. Distended stomach with a normal caliber duodenum. Query possible gastroparesis or functional outlet obstruction. Possible findings of proctitis. Correlate clinically. Hepatomegaly with steatosis, unchanged. Findings in the dependent lung bases which are suspicious for sequelae of aspiration pneumonia in the appropriate clinical setting.      XR CHEST PORTABLE    Result Date: 11/23/2021  Chronic lung changes with right basilar airspace opacity concerning for underlying pneumonia in the appropriate clinical setting. Correlate with aspiration risk. CTA HEAD NECK W CONTRAST    Result Date: 11/23/2021  No large vessel occlusion visualized within the head or neck. Incidentally noted pulmonary emphysema and dilated, air and fluid-filled esophagus. MRI BRAIN WO CONTRAST    Result Date: 11/24/2021  1. No acute intracranial abnormality, specifically no evidence of acute infarct. 2. Minimal microvascular ischemic disease. FL MODIFIED BARIUM SWALLOW W VIDEO    Result Date: 11/26/2021  No laryngeal penetration or aspiration. Please see separate speech pathology report for full discussion of findings and recommendations. Clinical Course : stable  Assessment and Plan  :        1. Esophagitis with Sarah-Abrams tear  2. Toxic metabolic encephalopathy secondary to dehydration  3. Expressive aphasia -resolved. Stroke ruled out with negative MRI  4. Hyponatremia due to dehydration -resolved. 5. Hypokalemia due to dehydration and decreased oral intake  6. Distal esophageal dilatation with gastritis -Protonix. 7. H/o Nissen fundoplication -   8. Constipation -enema x1-bowel regimen. Discharge to skilled nursing facility. Continue to monitor vitals , Intake / output ,  Cell count , HGB , Kidney function, oxygenation  as indicated . Plan and updates discussed with patient ,  answers  explained to satisfaction.    Plan discussed with Staff Kassandra Mcdaniel RN     (Please note that portions of this note were completed with a voice recognition program. Efforts were made to edit the dictations but occasionally words are mis-transcribed.)      Navid Zavala MD  11/30/2021

## 2021-11-30 NOTE — DISCHARGE INSTR - COC
Continuity of Care Form    Patient Name: Beth Thayer   :  1945  MRN:  [id number]    Admit date:  2021  Discharge date:  21    Code Status Order: Full Code   Advance Directives:   Advance Care Flowsheet Documentation       Date/Time Healthcare Directive Type of Healthcare Directive Copy in [address] Agent's Name Healthcare Agent's Phone Number    [phone] Yes, patient has an advance directive for healthcare treatment -- -- Adult Children Mukulilene Gregory --            Admitting Physician:  Shay Yepez MD  PCP: Saad Lopez MD    Discharging Nurse: [facility] Unit/Room#: 3417/8524-20  Discharging Unit Phone Number: [phone]    Emergency Contact:   Extended Emergency Contact Information  Primary Emergency Contact: Hamilton Ramirez  Home Phone: [phone]  Relation: Child    Past Surgical History:  Past Surgical History:   Procedure Laterality Date    APPENDECTOMY      CARPAL TUNNEL RELEASE      CHOLECYSTECTOMY, LAPAROSCOPIC N/A 2020    XI LAPAROSCOPIC ROBOTIC CHOLECYSTECTOMY, PYLOROPLASTY performed by Willian Lundberg MD at [address] [place] [address]      ERCP  2020    with stent insertion, balloon dilation, sphinctereotomy    ERCP  2020    ** CASE IN OR WITY GI STAFF** ERCP STENT INSERTION performed by Maria Dolores Shaikh MD at [facility]    ERCP  2020    ** CASE IN OR WITH GI STAFF**ERCP SPHINCTER/PAPILLOTOMY performed by Maria Dolores Shaikh MD at [facility]    ERCP  2020    ** CASE IN OR WITH GI STAFF**ERCP DILATION BALLOON performed by Maria Dolores Shaikh MD at [facility]    ERCP N/A 2021    ERCP STENT REMOVAL performed by Sarah Elizondo MD at [facility]    ERCP  2021    ERCP STONE REMOVAL performed by Sarah Elizondo MD at [address] [place] [address]    GASTRIC FUNDOPLICATION N/A     XI LAPAROSCOPIC ROBOTIC Catherin Larger, CHERYL FUNDOPLICATION performed by Sadiq Johnson MD at [address] [facility] 03/27/2020    EGD PEG TUBE PLACEMENT performed by Sadiq Johnson MD at [address] [facility] N/A 2/8/2021    **CASE IN O.R. W/ GI STAFF - EGD WITH REMOVAL PEG TUBE performed by Ania Polo MD at [facility]    HAND SURGERY      HC CATH POWER PICC TRIPLE  03/04/2020         HYSTERECTOMY      Abdominal    JOINT REPLACEMENT Right     right hip    OVARY REMOVAL      RHINOPLASTY      TONSILLECTOMY      TOTAL HIP ARTHROPLASTY      TOTAL NEPHRECTOMY      atrophic from infections, age 13    TRACHEOSTOMY N/A 03/27/2020    **ADD ON **WANTS 10:00AM **TRACHEOTOMY performed by Sadiq Johnson MD at [facility] N/A 04/02/2020    TRACHEOTOMY EXCHANGE performed by Sadiq Johnson MD at [facility] 03/17/2020    BEDSIDE EGD ESOPHAGOGASTRODUODENOSCOPY (ICU) performed by Sadiq Johnson MD at [address] [place] [address] 05/18/2020    EGD BIOPSY performed by Miracle Clarke MD at [address] [place] [address]  05/18/2020    EGD DILATION BALLOON performed by Miracle Clarke MD at [address] [place] [address] N/A 07/06/2020    ** CASE IN O.R. WITH GI STAFF** EGD ESOPHAGOGASTRODUODENOSCOPY performed by Desiree Burton MD at [address] [place] [address] 11/09/2020    EGD DIAGNOSTIC ONLY performed by Micah Crooks MD at [address] [place] [address]  02/08/2021    - EGD WITH REMOVAL PEG TUBE,ERCP STENT REMOVAL,ERCP STONE REMOVAL    UPPER GASTROINTESTINAL ENDOSCOPY N/A 11/24/2021    EGD BIOPSY performed by Micah Crooks MD at [facility]       Immunization History: There is no immunization history on file for this patient.     Active Problems:  Patient Active Problem List   Diagnosis Code    Frequency of urination R35.0    Back pain M54.9    Hiatal hernia with GERD and esophagitis K44.9, K21.00    MVP (mitral valve prolapse) I34.1    Depression F32. A    Anxiety F41.9    BENEDICT (acute kidney injury) ([facility] [address] N17.9    Dysphagia R13.10    Hiatal hernia K44.9    History of repair of hiatal hernia Z98.890, Z87.19    Centrilobular emphysema ([facility]) J43.2    Esophageal dilatation K22.89    Shock ([facility]) R57.9    Fever R50.9    Critical illness polyneuropathy ([facility]) G62.81    Gastric outlet obstruction K31.1    Recurrent UTI N39.0    Tracheostomy in place ([facility] [address] Z93.0    H/O gastric ulcer Z87.11    Hyperlipidemia E78.5    Hypertension I10    Tobacco abuse Z72.0    Chronic respiratory failure with hypoxia ([facility]) J96.11    S/P percutaneous endoscopic gastrostomy (PEG) tube placement ([facility]) Z93.1    S/P Nissen fundoplication (without gastrostomy tube) procedure Z98.890    Acute blood loss anemia D62    Phlebitis after infusion T80. 1XXA, I80.9    Esophagitis determined by endoscopy K20.90    Expressive aphasia R47.01    Transient speech disturbance R47.9    Speech disturbance R47.9    Coffee ground emesis K92.0    Acute cystitis without hematuria N30.00    Ulcerative esophagitis K22.10    Lower abdominal pain R10.30       Isolation/Infection:   Isolation            Contact          Patient Infection Status       Infection Onset Added Last Indicated Last Indicated By Review Planned Expiration Resolved Resolved By    ESBL (Extended Spectrum Beta Lactamase) 12/13/20 12/15/20 09/09/21 Culture, Urine        Klebsiella urine 9/21    MDRO (multi-drug resistant organism) 12/13/20 12/15/20 09/09/21 Culture, Urine        Proteus Urine 2/2021  Klebsiella Urine 12/2020    Resolved    COVID-19 (Rule Out) 09/09/21 09/09/21 09/09/21 COVID-19, Rapid (Ordered)   09/09/21 Rule-Out Test Resulted    COVID-19 (Rule Out) 12/13/20 12/13/20 12/13/20 COVID-19 (Ordered)   12/13/20 Rule-Out Test Resulted    COVID-19 (Rule Out) 07/05/20 07/05/20 07/05/20 COVID-19 (Ordered)   07/05/20 Rule-Out Test Resulted    C-diff Rule Out 05/27/20 05/27/20 05/27/20 C DIFF TOXIN/ANTIGEN (Ordered)   05/29/20 Alexandr Cali RN    COVID-19 (Rule Out) 05/20/20 05/20/20 05/20/20 COVID-19 (Ordered)   05/21/20 Rule-Out Test Resulted    COVID-19 (Rule Out) 05/14/20 05/14/20 05/14/20 COVID-19 (Ordered)   05/14/20 Rule-Out Test Resulted            Nurse Assessment:  Last Vital Signs: /76   Pulse 89   Temp 98.5 °F (36.9 °C) (Oral)   Resp 16   Ht 5' 2\" (1.575 m)   Wt 180 lb 1.9 oz (81.7 kg)   SpO2 96%   BMI 32.94 kg/m²     Last documented pain score (0-10 scale): Pain Level: 5  Last Weight:   Wt Readings from Last 1 Encounters:   11/30/21 180 lb 1.9 oz (81.7 kg)     Mental Status:  oriented, alert, coherent, logical, thought processes intact, and able to concentrate and follow conversation    IV Access:  - None    Nursing Mobility/ADLs:  Walking   Dependent  Transfer  Assisted  Bathing  Assisted  Dressing  Assisted  Toileting  Assisted  Feeding  Independent  Med [address] [place] [address] Delivery   whole    Wound Care Documentation and Therapy:  Wound 07/06/20 Coccyx Stage 1 (Active)   Number of days: 511        Elimination:  Continence: Bowel: Yes  Bladder: Yes  Urinary Catheter: None   Colostomy/Ileostomy/Ileal Conduit: No       Date of Last BM: 11/30/21    Intake/Output Summary (Last 24 hours) at 11/30/2021 1449  Last data filed at 11/30/2021 0752  Gross per 24 hour   Intake 855 ml   Output 800 ml   Net 55 ml     I/O last 3 completed shifts: In: 845 [P.O.:450; I.V.:395]  Out: 800 [Urine:800]    Safety Concerns:      At Risk for Falls    Impairments/Disabilities:      Vision    Nutrition Therapy:  Current Nutrition Therapy:   - Oral Diet:  General    Routes of Feeding: Oral  Liquids: No Restrictions  Daily Fluid Restriction: no  Last Modified Barium Swallow with Video (Video Swallowing Test): done on 11/26/21/     Treatments at the Time of Hospital Discharge:   Respiratory Treatments: n/a  Oxygen Therapy:  is not on home oxygen therapy. Ventilator:    - No ventilator support    Rehab Therapies: Physical Therapy and Occupational Therapy  Weight Bearing Status/Restrictions: No weight bearing restirctions  Other Medical Equipment (for information only, NOT a DME order):  wheelchair  Other Treatments: n/a    Patient's personal belongings (please select all that are sent with patient):  Glasses    RN SIGNATURE:  Electronically signed by Syeda Turpin RN on 11/30/21 at 4:03 PM EST    CASE MANAGEMENT/SOCIAL WORK SECTION    Inpatient Status Date: ***    Readmission Risk Assessment Score:  Readmission Risk              Risk of Unplanned Readmission:  23           Discharging to Facility/ Agency   Name:   [address] [place] [address] [facility] [address]       Phone: [phone]       Fax: [phone]        Dialysis Facility (if applicable)   Name:  Address:  Dialysis Schedule:  Phone:  Fax:    / signature: Electronically signed by Magalie Forde RN on 11/30/21 at 2:50 PM EST    PHYSICIAN SECTION    Prognosis: Good    Condition at Discharge: Stable    Rehab Potential (if transferring to Rehab): Fair    Recommended Labs or Other Treatments After Discharge: Protonix as advised. PT OT. Physician Certification: I certify the above information and transfer of Vick Barreto  is necessary for the continuing treatment of the diagnosis listed and that she requires [facility] for less 30 days.      Update Admission H&P: No change in H&P    PHYSICIAN SIGNATURE:  Electronically signed by Joelle Baker MD on 11/30/21 at 2:54 PM EST

## 2021-11-30 NOTE — PLAN OF CARE
Problem: Falls - Risk of:  Goal: Will remain free from falls  Description: Will remain free from falls  11/30/2021 1605 by Edwina Vargas RN  Outcome: Met This Shift     Problem: Falls - Risk of:  Goal: Absence of physical injury  Description: Absence of physical injury  11/30/2021 1605 by Edwina Vargas RN  Outcome: Met This Shift     Problem: Skin Integrity:  Goal: Will show no infection signs and symptoms  Description: Will show no infection signs and symptoms  11/30/2021 1605 by Edwina Vargas RN  Outcome: Met This Shift     Problem: Skin Integrity:  Goal: Absence of new skin breakdown  Description: Absence of new skin breakdown  11/30/2021 1605 by Edwina Vargas RN  Outcome: Met This Shift     Problem: Anxiety:  Goal: Level of anxiety will decrease  Description: Level of anxiety will decrease  11/30/2021 1605 by Edwina Vargas RN  Outcome: Ongoing     Problem: Infection:  Goal: Will remain free from infection  Description: Will remain free from infection  11/30/2021 1605 by Edwina Vargas RN  Outcome: Met This Shift     Problem: Safety:  Goal: Free from accidental physical injury  Description: Free from accidental physical injury  11/30/2021 1605 by Edwina Vargas RN  Outcome: Met This Shift     Problem: Safety:  Goal: Free from intentional harm  Description: Free from intentional harm  11/30/2021 1605 by Edwina Vargas RN  Outcome: Met This Shift     Problem: Daily Care:  Goal: Daily care needs are met  Description: Daily care needs are met  11/30/2021 1605 by Edwina Vargas RN  Outcome: Met This Shift     Problem: Pain:  Goal: Patient's pain/discomfort is manageable  Description: Patient's pain/discomfort is manageable  11/30/2021 1605 by Edwina Vargas RN  Outcome: Met This Shift     Problem: Pain:  Goal: Pain level will decrease  Description: Pain level will decrease  11/30/2021 1605 by Edwina Vargas RN  Outcome: Met This Shift     Problem: Pain:  Goal: Control of acute pain  Description: Control of acute pain  11/30/2021 1605 by Joby Corral RN  Outcome: Met This Shift     Problem: Pain:  Goal: Control of chronic pain  Description: Control of chronic pain  11/30/2021 1605 by Joby Corral RN  Outcome: Met This Shift     Problem: Skin Integrity:  Goal: Skin integrity will stabilize  Description: Skin integrity will stabilize  11/30/2021 1605 by Joby Corral RN  Outcome: Met This Shift     Problem: Discharge Planning:  Goal: Patients continuum of care needs are met  Description: Patients continuum of care needs are met  11/30/2021 1605 by Joby Corral RN  Outcome: Met This Shift     Problem: Nutrition  Goal: Optimal nutrition therapy  11/30/2021 1605 by Joby Corral RN  Outcome: Met This Shift

## 2021-12-01 NOTE — DISCHARGE SUMMARY
[facility]  Office: [phone]  Marilee Oliveros Blood DO, Cordella Holstein MD, Sandro Adams MD, Marcelo Calle MD, Sudarshan Miranda MD, Scott Carey MD, Traci Samson MD, Cori Berger MD, Patrizia Orantes MD, Kvng Camargo MD, Theresa Howell DO, Leonardo Sorto MD, Renella Boeck MD, Michael Naylor DO, Shirlene Vail MD,  Pb Cortez DO, Zee Díaz MD, Lauryn Bauman MD, Tomasa Rhodes CNP, [facility] CNP, Heike Osman CNP, Von Hernandez CNS, Antoine Robertson CNP, Lottie Kimble CNP, Josiah Childers CNP, Kathleen Joe CNP, Edwar Thompson CNP, Zerita Gamma PA-C, Schuyler Ruiz CNP, Laron Null CNP, Katarabella Tysona CNP, Jaime Walden CNP, Glory Mills CNP, Preet Burnettin, [facility]      Discharge Summary     Patient ID: Ijeoma Dunbar  :  1945   MRN: [id number]     ACCOUNT:  [de-identified]   Patient Location : [address] [place] [address]  Patient's PCP: Phong Monroe MD  Admit Date: 2021   Discharge Date: 2021     Length of Stay: 7  Code Status:  Prior  Admitting Physician: Marcelo Calle MD  Discharge Physician: Marcelo Calle MD     Active Discharge Diagnosis :     Primary Problem  Expressive [facility] Problems    Diagnosis Date Noted    Expressive aphasia [R47.01] 2020    Gastric outlet obstruction [K31.1] 2020    S/P Nissen fundoplication (without gastrostomy tube) procedure [Z98.890] 2020    Hypertension [I10]     Fever [R50.9]     Depression [F32. A]     Hiatal hernia with GERD and esophagitis [K44.9, K21.00]        Admission Condition:  poor     Discharged Condition: stable    Hospital Stay:     Hospital Course:  Ijeoma Dunbar is a 68 y.o. female who was admitted for the management of   Expressive aphasia , presented to ER with Abdominal Pain and Altered Mental Status  Patient presented with abdominal pain, altered mental status.   Patient underwent EGD on 11/24/2021 showing tortuous esophagus with dilated distal third without stricture or stenosis, Sarah-Abrams tear reflux esophagitis, gastritis in fundus, proximal gastric body. CT head was normal.  Neurology was consulted and recommended EEG /LTME for possible seizure episode which was negative. MRI brain was negative for any acute intracranial process, stroke was ruled out. Mental status was altered due to dehydration and improved to baseline with treatment. Significant therapeutic interventions:   1. Esophagitis with Sarah-Abrams tear  2. Toxic metabolic encephalopathy secondary to dehydration  3. Expressive aphasia - resolved. Stroke ruled out with negative MRI  4. Hyponatremia due to dehydration -resolved. 5. Hypokalemia due to dehydration and decreased oral intake  6. Distal esophageal dilatation with gastritis -Protonix. 7. H/o Nissen fundoplication -   8. Constipation -enema x1-bowel regimen.       Significant Diagnostic Studies:   Labs / Micro:/Radiology  Recent Labs     11/29/21  0553 11/28/21  0837 11/27/21  1802   WBC 3.7 3.9 4.8   HGB 12.0 12.4 11.5*   HCT 38.1 38.9 37.6   MCV 88.6 88.0 91.0    189 177     Labs Renal Latest Ref Rng & Units 11/29/2021 11/28/2021 11/27/2021 11/26/2021 11/24/2021   BUN 8 - 23 mg/dL 9 6(L) 7(L) 6(L) 13   Cr 0.50 - 0.90 mg/dL 0.76 0.69 0.76 0.73 1.21(H)   K 3.7 - 5.3 mmol/L 4.3 4.0 3.8 3.6(L) 3.9   Na 135 - 144 mmol/L 132(L) 134(L) 135 133(L) 136     Lab Results   Component Value Date    ALT 16 11/26/2021    AST 18 11/26/2021    ALKPHOS 112 (H) 11/26/2021    BILITOT 0.28 (L) 11/26/2021     No results found for: TSHFT4, TSH  Lab Results   Component Value Date    HEPAIGM NONREACTIVE 05/16/2020    HEPBIGM NONREACTIVE 05/16/2020    HEPBCAB NONREACTIVE 05/16/2020    HEPCAB NONREACTIVE 05/16/2020     Lab Results   Component Value Date    APPEARANCE Clear 06/29/2018    COLORU Yellow 11/23/2021    NITRU NEGATIVE 11/23/2021    GLUCOSEU NEGATIVE 11/23/2021 KETUA NEGATIVE 11/23/2021    UROBILINOGEN Normal 11/23/2021    BILIRUBINUR NEGATIVE 11/23/2021    BILIRUBINUR Negative 06/29/2018     Lab Results   Component Value Date    LABA1C 5.0 12/14/2020     Lab Results   Component Value Date    EAG 97 12/14/2020     Lab Results   Component Value Date    INR 1.1 11/24/2021    INR 1.0 11/23/2021    INR 1.1 09/10/2021    PROTIME 11.5 11/24/2021    PROTIME 11.0 11/23/2021    PROTIME 12.1 09/10/2021       CT CHEST WO CONTRAST    Result Date: 11/24/2021  1. Basilar opacities, right greater than left, with central airway congestion and filling of the peripheral subsegmental bronchi in the right lower lobe. Overall these features are suggestive of chronic/recurrent aspiration. 2.  Hiatal hernia with diffuse mild esophageal dilatation with fluid, which may represent reflux or stasis. 3.  Emphysema. MRI BRAIN WO CONTRAST    Result Date: 11/24/2021  1. No acute intracranial abnormality, specifically no evidence of acute infarct. 2. Minimal microvascular ischemic disease. FL MODIFIED BARIUM SWALLOW W VIDEO    Result Date: 11/26/2021  No laryngeal penetration or aspiration. Please see separate speech pathology report for full discussion of findings and recommendations. Consultations:    Consults:     Final Specialist Recommendations/Findings:   IP CONSULT TO STROKE TEAM  IP CONSULT TO GENERAL SURGERY  IP CONSULT TO HOSPITALIST  IP CONSULT TO INFECTIOUS DISEASES  IP CONSULT TO GI  IP CONSULT TO BARIATRICS      The patient was seen and examined on day of discharge and this discharge summary is in conjunction with any daily progress note from day of discharge.     Discharge plan:     Disposition: Skilled nursing facility     Physician Follow Up:     MD Chris Batista [address], [address]  [address] [facility] [address]  [phone]    Schedule an appointment as soon as possible for a visit in 2 weeks  Please call office to be scheduled with Dr. Cori Saleh for follow-up    Jennifer FRANCO [address], [place] [address]  [phone]    Schedule an appointment as soon as possible for a visit in 3 weeks  hospital follow up    Katie Reiss, [address] [address] [facility]  [phone]             Requiring Further Evaluation/Follow Up POST HOSPITALIZATION/Incidental Findings: f/u with GI as outpatient . Follow up with PCP. Diet: regular diet    Activity: As tolerated.     Instructions to Patient: stool softeners,     Discharge Medications:      Medication List      START taking these medications    dextromethorphan-guaiFENesin  MG/5ML syrup  Commonly known as: ROBITUSSIN-DM  Take 10 mLs by mouth every 4 hours as needed for Cough     oxybutynin 5 MG tablet  Commonly known as: DITROPAN  Take 1 tablet by mouth 2 times daily as needed (BLADDER SPASMS)        CHANGE how you take these medications    busPIRone 10 MG tablet  Commonly known as: BUSPAR  Take 1 tablet by mouth 2 times daily  What changed:   · how much to take  · when to take this     docusate 50 MG/5ML liquid  Commonly known as: COLACE  Take 10 mLs by mouth 2 times daily as needed (constipation)  What changed:   · how to take this  · when to take this  · reasons to take this        CONTINUE taking these medications    amitriptyline 25 MG tablet  Commonly known as: ELAVIL  Take 1 tablet by mouth nightly     bisacodyl 10 MG suppository  Commonly known as: DULCOLAX     calcium carbonate 500 MG chewable tablet  Commonly known as: TUMS     ferrous sulfate 325 (65 Fe) MG EC tablet  Commonly known as: FE TABS 325  Take 1 tablet by mouth daily (with breakfast)     fleet 7-19 GM/118ML     furosemide 20 MG tablet  Commonly known as: LASIX  Take 1 tablet by mouth daily     Lotrisone 1-0.05 % cream  Generic drug: clotrimazole-betamethasone     metoclopramide 5 MG tablet  Commonly known as: REGLAN     metoprolol succinate 25 MG extended release tablet  Commonly known as: TOPROL XL  Take 1 tablet by mouth daily     Milk of Magnesia 400 MG/5ML suspension  Generic drug: magnesium hydroxide     Oyster Shell 500 MG Tabs     pantoprazole 40 MG tablet  Commonly known as: PROTONIX  Take 1 tablet by mouth 2 times daily     polyethylene glycol 17 g packet  Commonly known as: GLYCOLAX     QUEtiapine 25 MG tablet  Commonly known as: SEROQUEL  Take 1 tablet by mouth 2 times daily for 7 days     RA Vitamin D-3 50 MCG (2000 UT) Caps  Generic drug: Cholecalciferol     simvastatin 40 MG tablet  Commonly known as: ZOCOR     sucralfate 1 GM tablet  Commonly known as: CARAFATE  Take 1 tablet by mouth 4 times daily     Symbicort 160-4.5 MCG/ACT Aero  Generic drug: budesonide-formoterol     traZODone 100 MG tablet  Commonly known as: DESYREL  Take 1 tablet by mouth nightly for 7 days        STOP taking these medications    clonazePAM 0.5 MG tablet  Commonly known as: KLONOPIN     gabapentin 300 MG capsule  Commonly known as: NEURONTIN           Where to Get Your Medications      These medications were sent to [facility] [address] [facility], [facility]  [address] [facility], [address] [address]    Phone: [phone]   · docusate 50 MG/5ML liquid  · pantoprazole 40 MG tablet     You can get these medications from any pharmacy    Bring a paper prescription for each of these medications  · QUEtiapine 25 MG tablet  · traZODone 100 MG tablet     Information about where to get these medications is not yet available    Ask your nurse or doctor about these medications  · busPIRone 10 MG tablet  · dextromethorphan-guaiFENesin  MG/5ML syrup  · oxybutynin 5 MG tablet         Time Spent on discharge is  33 mins in patient examination, evaluation, counseling as well as medication reconciliation, prescriptions for required medications, discharge plan and follow up.     Electronically signed by   Shay Yepez MD  12/1/2021        Thank you Dr. Saad Lopez MD for the opportunity to be involved in this patient's care.

## 2021-12-02 ENCOUNTER — TELEPHONE (OUTPATIENT)
Dept: GASTROENTEROLOGY | Age: 76
End: 2021-12-02

## 2021-12-02 NOTE — TELEPHONE ENCOUNTER
Cedrick Knott to schedule patients hospital f/u appt, advised them that in the notes they would like her to be seen by Dr. Barrett Lundborg and to possibly do a virtual visit, they insisted on her only wanting to be seen by Dr. Nelida Arenas in the office, scheduled her to see Dr. Nelida Arenas.

## 2021-12-04 LAB
EKG ATRIAL RATE: 99 BPM
EKG P AXIS: 83 DEGREES
EKG P-R INTERVAL: 152 MS
EKG Q-T INTERVAL: 356 MS
EKG QRS DURATION: 74 MS
EKG QTC CALCULATION (BAZETT): 456 MS
EKG R AXIS: 33 DEGREES
EKG T AXIS: 12 DEGREES
EKG VENTRICULAR RATE: 99 BPM

## 2021-12-04 PROCEDURE — 93010 ELECTROCARDIOGRAM REPORT: CPT | Performed by: INTERNAL MEDICINE

## 2021-12-17 ENCOUNTER — HOSPITAL ENCOUNTER (INPATIENT)
Age: 76
LOS: 1 days | Discharge: OTHER INSTITUTION | DRG: 388 | End: 2021-12-19
Attending: EMERGENCY MEDICINE
Payer: MEDICARE

## 2021-12-17 ENCOUNTER — APPOINTMENT (OUTPATIENT)
Dept: GENERAL RADIOLOGY | Age: 76
DRG: 388 | End: 2021-12-17
Payer: MEDICARE

## 2021-12-17 DIAGNOSIS — F33.1 MODERATE EPISODE OF RECURRENT MAJOR DEPRESSIVE DISORDER (HCC): ICD-10-CM

## 2021-12-17 DIAGNOSIS — J18.9 PNEUMONIA OF BOTH UPPER LOBES DUE TO INFECTIOUS ORGANISM: ICD-10-CM

## 2021-12-17 DIAGNOSIS — K59.09 OTHER CONSTIPATION: ICD-10-CM

## 2021-12-17 DIAGNOSIS — K56.7 ILEUS (HCC): Primary | ICD-10-CM

## 2021-12-17 DIAGNOSIS — F41.9 ANXIETY: ICD-10-CM

## 2021-12-17 LAB
ABSOLUTE EOS #: 0.34 K/UL (ref 0–0.44)
ABSOLUTE IMMATURE GRANULOCYTE: 0.09 K/UL (ref 0–0.3)
ABSOLUTE LYMPH #: 1.32 K/UL (ref 1.1–3.7)
ABSOLUTE MONO #: 0.52 K/UL (ref 0.1–1.2)
ALBUMIN SERPL-MCNC: 3.3 G/DL (ref 3.5–5.2)
ALBUMIN/GLOBULIN RATIO: 1.1 (ref 1–2.5)
ALP BLD-CCNC: 148 U/L (ref 35–104)
ALT SERPL-CCNC: 19 U/L (ref 5–33)
ANION GAP SERPL CALCULATED.3IONS-SCNC: 13 MMOL/L (ref 9–17)
AST SERPL-CCNC: 17 U/L
BASOPHILS # BLD: 0 % (ref 0–2)
BASOPHILS ABSOLUTE: 0.03 K/UL (ref 0–0.2)
BILIRUB SERPL-MCNC: 0.26 MG/DL (ref 0.3–1.2)
BILIRUBIN DIRECT: <0.08 MG/DL
BILIRUBIN, INDIRECT: ABNORMAL MG/DL (ref 0–1)
BUN BLDV-MCNC: 12 MG/DL (ref 8–23)
BUN/CREAT BLD: ABNORMAL (ref 9–20)
CALCIUM SERPL-MCNC: 8.4 MG/DL (ref 8.6–10.4)
CHLORIDE BLD-SCNC: 105 MMOL/L (ref 98–107)
CO2: 17 MMOL/L (ref 20–31)
CREAT SERPL-MCNC: 0.92 MG/DL (ref 0.5–0.9)
DIFFERENTIAL TYPE: ABNORMAL
EOSINOPHILS RELATIVE PERCENT: 4 % (ref 1–4)
GFR AFRICAN AMERICAN: >60 ML/MIN
GFR NON-AFRICAN AMERICAN: 59 ML/MIN
GFR SERPL CREATININE-BSD FRML MDRD: ABNORMAL ML/MIN/{1.73_M2}
GFR SERPL CREATININE-BSD FRML MDRD: ABNORMAL ML/MIN/{1.73_M2}
GLOBULIN: ABNORMAL G/DL (ref 1.5–3.8)
GLUCOSE BLD-MCNC: 224 MG/DL (ref 70–99)
HCT VFR BLD CALC: 37.1 % (ref 36.3–47.1)
HEMOGLOBIN: 11.6 G/DL (ref 11.9–15.1)
IMMATURE GRANULOCYTES: 1 %
LACTIC ACID, WHOLE BLOOD: 3.1 MMOL/L (ref 0.7–2.1)
LIPASE: 13 U/L (ref 13–60)
LYMPHOCYTES # BLD: 14 % (ref 24–43)
MCH RBC QN AUTO: 27.4 PG (ref 25.2–33.5)
MCHC RBC AUTO-ENTMCNC: 31.3 G/DL (ref 28.4–34.8)
MCV RBC AUTO: 87.7 FL (ref 82.6–102.9)
MONOCYTES # BLD: 6 % (ref 3–12)
NRBC AUTOMATED: 0 PER 100 WBC
PDW BLD-RTO: 16.2 % (ref 11.8–14.4)
PLATELET # BLD: 196 K/UL (ref 138–453)
PLATELET ESTIMATE: ABNORMAL
PMV BLD AUTO: 11.3 FL (ref 8.1–13.5)
POTASSIUM SERPL-SCNC: 4.2 MMOL/L (ref 3.7–5.3)
RBC # BLD: 4.23 M/UL (ref 3.95–5.11)
RBC # BLD: ABNORMAL 10*6/UL
SARS-COV-2, RAPID: NOT DETECTED
SEG NEUTROPHILS: 75 % (ref 36–65)
SEGMENTED NEUTROPHILS ABSOLUTE COUNT: 7 K/UL (ref 1.5–8.1)
SODIUM BLD-SCNC: 135 MMOL/L (ref 135–144)
SPECIMEN DESCRIPTION: NORMAL
TOTAL PROTEIN: 6.4 G/DL (ref 6.4–8.3)
WBC # BLD: 9.3 K/UL (ref 3.5–11.3)
WBC # BLD: ABNORMAL 10*3/UL

## 2021-12-17 PROCEDURE — 2580000003 HC RX 258: Performed by: STUDENT IN AN ORGANIZED HEALTH CARE EDUCATION/TRAINING PROGRAM

## 2021-12-17 PROCEDURE — 85025 COMPLETE CBC W/AUTO DIFF WBC: CPT

## 2021-12-17 PROCEDURE — 83605 ASSAY OF LACTIC ACID: CPT

## 2021-12-17 PROCEDURE — 83690 ASSAY OF LIPASE: CPT

## 2021-12-17 PROCEDURE — 96374 THER/PROPH/DIAG INJ IV PUSH: CPT

## 2021-12-17 PROCEDURE — 80076 HEPATIC FUNCTION PANEL: CPT

## 2021-12-17 PROCEDURE — 6360000002 HC RX W HCPCS: Performed by: EMERGENCY MEDICINE

## 2021-12-17 PROCEDURE — 87635 SARS-COV-2 COVID-19 AMP PRB: CPT

## 2021-12-17 PROCEDURE — 80048 BASIC METABOLIC PNL TOTAL CA: CPT

## 2021-12-17 PROCEDURE — 96375 TX/PRO/DX INJ NEW DRUG ADDON: CPT

## 2021-12-17 PROCEDURE — 71045 X-RAY EXAM CHEST 1 VIEW: CPT

## 2021-12-17 PROCEDURE — 99284 EMERGENCY DEPT VISIT MOD MDM: CPT

## 2021-12-17 RX ORDER — MORPHINE SULFATE 4 MG/ML
2 INJECTION, SOLUTION INTRAMUSCULAR; INTRAVENOUS ONCE
Status: COMPLETED | OUTPATIENT
Start: 2021-12-17 | End: 2021-12-17

## 2021-12-17 RX ORDER — 0.9 % SODIUM CHLORIDE 0.9 %
500 INTRAVENOUS SOLUTION INTRAVENOUS ONCE
Status: COMPLETED | OUTPATIENT
Start: 2021-12-17 | End: 2021-12-18

## 2021-12-17 RX ORDER — ONDANSETRON 2 MG/ML
4 INJECTION INTRAMUSCULAR; INTRAVENOUS ONCE
Status: COMPLETED | OUTPATIENT
Start: 2021-12-17 | End: 2021-12-17

## 2021-12-17 RX ADMIN — SODIUM CHLORIDE 500 ML: 9 INJECTION, SOLUTION INTRAVENOUS at 23:33

## 2021-12-17 RX ADMIN — ONDANSETRON 4 MG: 2 INJECTION INTRAMUSCULAR; INTRAVENOUS at 22:35

## 2021-12-17 RX ADMIN — MORPHINE SULFATE 2 MG: 4 INJECTION INTRAVENOUS at 22:36

## 2021-12-17 ASSESSMENT — PAIN SCALES - GENERAL
PAINLEVEL_OUTOF10: 8
PAINLEVEL_OUTOF10: 8

## 2021-12-17 ASSESSMENT — PAIN DESCRIPTION - LOCATION: LOCATION: ABDOMEN

## 2021-12-17 NOTE — Clinical Note
Patient Class: Inpatient [101]   REQUIRED: Diagnosis: [facility] [[id number]]   Estimated Length of Stay: Estimated stay of less than 2 midnights   Admitting Provider: Terrence Blank [[id number]]

## 2021-12-18 ENCOUNTER — APPOINTMENT (OUTPATIENT)
Dept: CT IMAGING | Age: 76
DRG: 388 | End: 2021-12-18
Payer: MEDICARE

## 2021-12-18 PROBLEM — R06.09 DYSPNEA ON EXERTION: Status: ACTIVE | Noted: 2020-06-19

## 2021-12-18 PROBLEM — D50.0 ANEMIA DUE TO BLOOD LOSS: Status: ACTIVE | Noted: 2020-06-02

## 2021-12-18 PROBLEM — R26.2 DIFFICULTY WALKING: Status: ACTIVE | Noted: 2020-06-19

## 2021-12-18 PROBLEM — I95.1 ORTHOSTATIC HYPOTENSION: Status: ACTIVE | Noted: 2020-06-02

## 2021-12-18 PROBLEM — K56.7 ILEUS (HCC): Status: ACTIVE | Noted: 2021-12-18

## 2021-12-18 PROBLEM — F41.9 ANXIETY: Status: ACTIVE | Noted: 2019-09-11

## 2021-12-18 PROBLEM — E66.9 CLASS 1 OBESITY IN ADULT: Status: ACTIVE | Noted: 2021-12-18

## 2021-12-18 PROBLEM — F32.A DEPRESSION: Status: ACTIVE | Noted: 2019-09-11

## 2021-12-18 PROBLEM — R50.9 FEVER: Status: ACTIVE | Noted: 2020-06-02

## 2021-12-18 PROBLEM — R10.84 GENERALIZED ABDOMINAL PAIN: Status: ACTIVE | Noted: 2021-12-18

## 2021-12-18 PROBLEM — J96.11 CHRONIC RESPIRATORY FAILURE WITH HYPOXIA (HCC): Status: ACTIVE | Noted: 2020-04-06

## 2021-12-18 PROBLEM — J18.9 MULTIFOCAL PNEUMONIA: Status: ACTIVE | Noted: 2021-12-18

## 2021-12-18 PROBLEM — K21.9 GERD (GASTROESOPHAGEAL REFLUX DISEASE): Status: ACTIVE | Noted: 2019-09-11

## 2021-12-18 PROBLEM — I34.1 MVP (MITRAL VALVE PROLAPSE): Status: ACTIVE | Noted: 2019-09-11

## 2021-12-18 PROBLEM — G89.29 CHRONIC PAIN: Status: ACTIVE | Noted: 2020-06-19

## 2021-12-18 PROBLEM — M54.9 BACKACHE: Status: ACTIVE | Noted: 2019-09-11

## 2021-12-18 PROBLEM — I10 ESSENTIAL HYPERTENSION: Status: ACTIVE | Noted: 2020-06-02

## 2021-12-18 PROBLEM — N18.30 STAGE 3 CHRONIC KIDNEY DISEASE (HCC): Status: ACTIVE | Noted: 2020-06-02

## 2021-12-18 LAB
-: NORMAL
AMORPHOUS: NORMAL
BACTERIA: NORMAL
BILIRUBIN URINE: NEGATIVE
CASTS UA: NORMAL /LPF (ref 0–8)
COLOR: YELLOW
COMMENT UA: ABNORMAL
CRYSTALS, UA: NORMAL /HPF
EPITHELIAL CELLS UA: NORMAL /HPF (ref 0–5)
GLUCOSE URINE: NEGATIVE
KETONES, URINE: NEGATIVE
LACTIC ACID, WHOLE BLOOD: 1.1 MMOL/L (ref 0.7–2.1)
LACTIC ACID: NORMAL MMOL/L
LEUKOCYTE ESTERASE, URINE: ABNORMAL
MUCUS: NORMAL
NITRITE, URINE: NEGATIVE
OTHER OBSERVATIONS UA: NORMAL
PH UA: 6 (ref 5–8)
PROTEIN UA: NEGATIVE
RBC UA: NORMAL /HPF (ref 0–4)
RENAL EPITHELIAL, UA: NORMAL /HPF
SPECIFIC GRAVITY UA: 1.02 (ref 1–1.03)
TRICHOMONAS: NORMAL
TURBIDITY: CLEAR
URINE HGB: NEGATIVE
UROBILINOGEN, URINE: NORMAL
WBC UA: NORMAL /HPF (ref 0–5)
YEAST: NORMAL

## 2021-12-18 PROCEDURE — 6370000000 HC RX 637 (ALT 250 FOR IP): Performed by: NURSE PRACTITIONER

## 2021-12-18 PROCEDURE — APPSS45 APP SPLIT SHARED TIME 31-45 MINUTES: Performed by: NURSE PRACTITIONER

## 2021-12-18 PROCEDURE — 6360000002 HC RX W HCPCS: Performed by: NURSE PRACTITIONER

## 2021-12-18 PROCEDURE — 99222 1ST HOSP IP/OBS MODERATE 55: CPT | Performed by: INTERNAL MEDICINE

## 2021-12-18 PROCEDURE — 6370000000 HC RX 637 (ALT 250 FOR IP): Performed by: INTERNAL MEDICINE

## 2021-12-18 PROCEDURE — 2580000003 HC RX 258: Performed by: INTERNAL MEDICINE

## 2021-12-18 PROCEDURE — 2580000003 HC RX 258: Performed by: NURSE PRACTITIONER

## 2021-12-18 PROCEDURE — 6360000002 HC RX W HCPCS: Performed by: STUDENT IN AN ORGANIZED HEALTH CARE EDUCATION/TRAINING PROGRAM

## 2021-12-18 PROCEDURE — 74177 CT ABD & PELVIS W/CONTRAST: CPT

## 2021-12-18 PROCEDURE — 6360000002 HC RX W HCPCS: Performed by: INTERNAL MEDICINE

## 2021-12-18 PROCEDURE — 81001 URINALYSIS AUTO W/SCOPE: CPT

## 2021-12-18 PROCEDURE — 1200000000 HC SEMI PRIVATE

## 2021-12-18 PROCEDURE — 83605 ASSAY OF LACTIC ACID: CPT

## 2021-12-18 PROCEDURE — 2580000003 HC RX 258: Performed by: STUDENT IN AN ORGANIZED HEALTH CARE EDUCATION/TRAINING PROGRAM

## 2021-12-18 PROCEDURE — 6360000004 HC RX CONTRAST MEDICATION: Performed by: STUDENT IN AN ORGANIZED HEALTH CARE EDUCATION/TRAINING PROGRAM

## 2021-12-18 RX ORDER — QUETIAPINE FUMARATE 100 MG/1
100 TABLET, FILM COATED ORAL NIGHTLY
Status: DISCONTINUED | OUTPATIENT
Start: 2021-12-18 | End: 2021-12-19 | Stop reason: HOSPADM

## 2021-12-18 RX ORDER — FUROSEMIDE 20 MG/1
20 TABLET ORAL DAILY
Status: DISCONTINUED | OUTPATIENT
Start: 2021-12-18 | End: 2021-12-19 | Stop reason: HOSPADM

## 2021-12-18 RX ORDER — ONDANSETRON 2 MG/ML
4 INJECTION INTRAMUSCULAR; INTRAVENOUS EVERY 6 HOURS PRN
Status: DISCONTINUED | OUTPATIENT
Start: 2021-12-18 | End: 2021-12-19 | Stop reason: HOSPADM

## 2021-12-18 RX ORDER — BISACODYL 10 MG
10 SUPPOSITORY, RECTAL RECTAL DAILY PRN
Status: DISCONTINUED | OUTPATIENT
Start: 2021-12-18 | End: 2021-12-19 | Stop reason: HOSPADM

## 2021-12-18 RX ORDER — CLONAZEPAM 1 MG/1
0.5 TABLET ORAL 2 TIMES DAILY PRN
Status: DISCONTINUED | OUTPATIENT
Start: 2021-12-18 | End: 2021-12-19 | Stop reason: HOSPADM

## 2021-12-18 RX ORDER — GABAPENTIN 300 MG/1
300 CAPSULE ORAL 3 TIMES DAILY
Status: DISCONTINUED | OUTPATIENT
Start: 2021-12-18 | End: 2021-12-19 | Stop reason: HOSPADM

## 2021-12-18 RX ORDER — POTASSIUM CHLORIDE 7.45 MG/ML
10 INJECTION INTRAVENOUS PRN
Status: DISCONTINUED | OUTPATIENT
Start: 2021-12-18 | End: 2021-12-19 | Stop reason: HOSPADM

## 2021-12-18 RX ORDER — AMITRIPTYLINE HYDROCHLORIDE 25 MG/1
25 TABLET, FILM COATED ORAL NIGHTLY
Status: DISCONTINUED | OUTPATIENT
Start: 2021-12-18 | End: 2021-12-19 | Stop reason: HOSPADM

## 2021-12-18 RX ORDER — GUAIFENESIN DEXTROMETHORPHAN HYDROBROMIDE ORAL SOLUTION 10; 100 MG/5ML; MG/5ML
10 SOLUTION ORAL EVERY 4 HOURS PRN
Status: DISCONTINUED | OUTPATIENT
Start: 2021-12-18 | End: 2021-12-19 | Stop reason: HOSPADM

## 2021-12-18 RX ORDER — ALBUTEROL SULFATE 2.5 MG/3ML
2.5 SOLUTION RESPIRATORY (INHALATION)
Status: DISCONTINUED | OUTPATIENT
Start: 2021-12-18 | End: 2021-12-19 | Stop reason: HOSPADM

## 2021-12-18 RX ORDER — MORPHINE SULFATE 4 MG/ML
2 INJECTION, SOLUTION INTRAMUSCULAR; INTRAVENOUS EVERY 4 HOURS PRN
Status: DISCONTINUED | OUTPATIENT
Start: 2021-12-18 | End: 2021-12-19 | Stop reason: HOSPADM

## 2021-12-18 RX ORDER — PANTOPRAZOLE SODIUM 40 MG/1
40 TABLET, DELAYED RELEASE ORAL 2 TIMES DAILY
Status: DISCONTINUED | OUTPATIENT
Start: 2021-12-18 | End: 2021-12-19 | Stop reason: HOSPADM

## 2021-12-18 RX ORDER — TRAZODONE HYDROCHLORIDE 100 MG/1
100 TABLET ORAL NIGHTLY
Status: DISCONTINUED | OUTPATIENT
Start: 2021-12-18 | End: 2021-12-19 | Stop reason: HOSPADM

## 2021-12-18 RX ORDER — SENNA PLUS 8.6 MG/1
2 TABLET ORAL 2 TIMES DAILY
Status: DISCONTINUED | OUTPATIENT
Start: 2021-12-18 | End: 2021-12-18

## 2021-12-18 RX ORDER — QUETIAPINE FUMARATE 25 MG/1
25 TABLET, FILM COATED ORAL
Status: DISCONTINUED | OUTPATIENT
Start: 2021-12-19 | End: 2021-12-19 | Stop reason: HOSPADM

## 2021-12-18 RX ORDER — SODIUM PHOSPHATE, DIBASIC AND SODIUM PHOSPHATE, MONOBASIC 7; 19 G/133ML; G/133ML
118 ENEMA RECTAL ONCE
Status: DISCONTINUED | OUTPATIENT
Start: 2021-12-18 | End: 2021-12-18

## 2021-12-18 RX ORDER — GABAPENTIN 300 MG/1
300 CAPSULE ORAL 3 TIMES DAILY
Status: ON HOLD | COMMUNITY
End: 2021-12-19 | Stop reason: SDUPTHER

## 2021-12-18 RX ORDER — POTASSIUM CHLORIDE 20 MEQ/1
40 TABLET, EXTENDED RELEASE ORAL PRN
Status: DISCONTINUED | OUTPATIENT
Start: 2021-12-18 | End: 2021-12-19 | Stop reason: HOSPADM

## 2021-12-18 RX ORDER — METOCLOPRAMIDE 10 MG/1
5 TABLET ORAL
Status: DISCONTINUED | OUTPATIENT
Start: 2021-12-18 | End: 2021-12-19

## 2021-12-18 RX ORDER — QUETIAPINE FUMARATE 25 MG/1
25 TABLET, FILM COATED ORAL
Status: ON HOLD | COMMUNITY
End: 2021-12-19 | Stop reason: SDUPTHER

## 2021-12-18 RX ORDER — ONDANSETRON 4 MG/1
4 TABLET, ORALLY DISINTEGRATING ORAL EVERY 8 HOURS PRN
Status: DISCONTINUED | OUTPATIENT
Start: 2021-12-18 | End: 2021-12-19 | Stop reason: HOSPADM

## 2021-12-18 RX ORDER — CALCIUM CARBONATE 500(1250)
500 TABLET ORAL 2 TIMES DAILY
Status: DISCONTINUED | OUTPATIENT
Start: 2021-12-18 | End: 2021-12-19 | Stop reason: HOSPADM

## 2021-12-18 RX ORDER — OXYBUTYNIN CHLORIDE 5 MG/1
5 TABLET ORAL 2 TIMES DAILY PRN
Status: DISCONTINUED | OUTPATIENT
Start: 2021-12-18 | End: 2021-12-19 | Stop reason: HOSPADM

## 2021-12-18 RX ORDER — MAGNESIUM SULFATE 1 G/100ML
1000 INJECTION INTRAVENOUS PRN
Status: DISCONTINUED | OUTPATIENT
Start: 2021-12-18 | End: 2021-12-19 | Stop reason: HOSPADM

## 2021-12-18 RX ORDER — CALCIUM CARBONATE 200(500)MG
1 TABLET,CHEWABLE ORAL 3 TIMES DAILY PRN
Status: DISCONTINUED | OUTPATIENT
Start: 2021-12-18 | End: 2021-12-19 | Stop reason: HOSPADM

## 2021-12-18 RX ORDER — LANOLIN ALCOHOL/MO/W.PET/CERES
325 CREAM (GRAM) TOPICAL
Status: DISCONTINUED | OUTPATIENT
Start: 2021-12-18 | End: 2021-12-19 | Stop reason: HOSPADM

## 2021-12-18 RX ORDER — QUETIAPINE FUMARATE 25 MG/1
25 TABLET, FILM COATED ORAL 2 TIMES DAILY
Status: DISCONTINUED | OUTPATIENT
Start: 2021-12-18 | End: 2021-12-18

## 2021-12-18 RX ORDER — SUCRALFATE 1 G/1
1 TABLET ORAL 4 TIMES DAILY
Status: DISCONTINUED | OUTPATIENT
Start: 2021-12-18 | End: 2021-12-19 | Stop reason: HOSPADM

## 2021-12-18 RX ORDER — ACETAMINOPHEN 650 MG/1
650 SUPPOSITORY RECTAL EVERY 6 HOURS PRN
Status: DISCONTINUED | OUTPATIENT
Start: 2021-12-18 | End: 2021-12-19 | Stop reason: HOSPADM

## 2021-12-18 RX ORDER — QUETIAPINE FUMARATE 100 MG/1
100 TABLET, FILM COATED ORAL NIGHTLY
Status: ON HOLD | COMMUNITY
End: 2021-12-19 | Stop reason: SDUPTHER

## 2021-12-18 RX ORDER — ATORVASTATIN CALCIUM 20 MG/1
20 TABLET, FILM COATED ORAL DAILY
Status: DISCONTINUED | OUTPATIENT
Start: 2021-12-18 | End: 2021-12-19 | Stop reason: HOSPADM

## 2021-12-18 RX ORDER — ESCITALOPRAM OXALATE 10 MG/1
10 TABLET ORAL DAILY
Status: DISCONTINUED | OUTPATIENT
Start: 2021-12-18 | End: 2021-12-19 | Stop reason: HOSPADM

## 2021-12-18 RX ORDER — LORAZEPAM 1 MG/1
1 TABLET ORAL EVERY 8 HOURS PRN
Status: ON HOLD | COMMUNITY
End: 2021-12-19 | Stop reason: SDUPTHER

## 2021-12-18 RX ORDER — ACETAMINOPHEN 325 MG/1
650 TABLET ORAL EVERY 6 HOURS PRN
Status: DISCONTINUED | OUTPATIENT
Start: 2021-12-18 | End: 2021-12-19 | Stop reason: HOSPADM

## 2021-12-18 RX ORDER — SODIUM CHLORIDE 0.9 % (FLUSH) 0.9 %
10 SYRINGE (ML) INJECTION PRN
Status: DISCONTINUED | OUTPATIENT
Start: 2021-12-18 | End: 2021-12-19 | Stop reason: HOSPADM

## 2021-12-18 RX ORDER — POLYETHYLENE GLYCOL 3350 17 G/17G
17 POWDER, FOR SOLUTION ORAL DAILY
Status: DISCONTINUED | OUTPATIENT
Start: 2021-12-18 | End: 2021-12-19 | Stop reason: HOSPADM

## 2021-12-18 RX ORDER — SODIUM CHLORIDE 0.9 % (FLUSH) 0.9 %
5-40 SYRINGE (ML) INJECTION EVERY 12 HOURS SCHEDULED
Status: DISCONTINUED | OUTPATIENT
Start: 2021-12-18 | End: 2021-12-19 | Stop reason: HOSPADM

## 2021-12-18 RX ORDER — SODIUM PHOSPHATE, DIBASIC AND SODIUM PHOSPHATE, MONOBASIC 7; 19 G/133ML; G/133ML
1 ENEMA RECTAL
Status: ACTIVE | OUTPATIENT
Start: 2021-12-18 | End: 2021-12-18

## 2021-12-18 RX ORDER — BUDESONIDE AND FORMOTEROL FUMARATE DIHYDRATE 160; 4.5 UG/1; UG/1
2 AEROSOL RESPIRATORY (INHALATION) 2 TIMES DAILY
Status: DISCONTINUED | OUTPATIENT
Start: 2021-12-18 | End: 2021-12-19 | Stop reason: HOSPADM

## 2021-12-18 RX ORDER — METOPROLOL SUCCINATE 25 MG/1
25 TABLET, EXTENDED RELEASE ORAL DAILY
Status: DISCONTINUED | OUTPATIENT
Start: 2021-12-18 | End: 2021-12-19 | Stop reason: HOSPADM

## 2021-12-18 RX ORDER — BUSPIRONE HYDROCHLORIDE 10 MG/1
10 TABLET ORAL 2 TIMES DAILY
Status: DISCONTINUED | OUTPATIENT
Start: 2021-12-18 | End: 2021-12-19 | Stop reason: HOSPADM

## 2021-12-18 RX ORDER — LACTULOSE 10 G/15ML
20 SOLUTION ORAL 3 TIMES DAILY
Status: DISPENSED | OUTPATIENT
Start: 2021-12-18 | End: 2021-12-19

## 2021-12-18 RX ORDER — ESCITALOPRAM OXALATE 10 MG/1
20 TABLET ORAL DAILY
COMMUNITY

## 2021-12-18 RX ORDER — CLONAZEPAM 0.5 MG/1
0.5 TABLET ORAL 2 TIMES DAILY PRN
Status: ON HOLD | COMMUNITY
End: 2021-12-19 | Stop reason: HOSPADM

## 2021-12-18 RX ORDER — SODIUM CHLORIDE 9 MG/ML
25 INJECTION, SOLUTION INTRAVENOUS PRN
Status: DISCONTINUED | OUTPATIENT
Start: 2021-12-18 | End: 2021-12-19 | Stop reason: HOSPADM

## 2021-12-18 RX ORDER — SODIUM CHLORIDE 9 MG/ML
INJECTION, SOLUTION INTRAVENOUS CONTINUOUS
Status: DISCONTINUED | OUTPATIENT
Start: 2021-12-18 | End: 2021-12-19 | Stop reason: HOSPADM

## 2021-12-18 RX ADMIN — MORPHINE SULFATE 2 MG: 4 INJECTION INTRAVENOUS at 17:26

## 2021-12-18 RX ADMIN — METOPROLOL SUCCINATE 25 MG: 25 TABLET, FILM COATED, EXTENDED RELEASE ORAL at 13:55

## 2021-12-18 RX ADMIN — MORPHINE SULFATE 2 MG: 4 INJECTION INTRAVENOUS at 09:05

## 2021-12-18 RX ADMIN — AMITRIPTYLINE HYDROCHLORIDE 25 MG: 25 TABLET, FILM COATED ORAL at 22:49

## 2021-12-18 RX ADMIN — SUCRALFATE 1 G: 1 TABLET ORAL at 22:49

## 2021-12-18 RX ADMIN — BISACODYL 10 MG: 10 SUPPOSITORY RECTAL at 17:26

## 2021-12-18 RX ADMIN — SODIUM CHLORIDE, PRESERVATIVE FREE 10 ML: 5 INJECTION INTRAVENOUS at 20:41

## 2021-12-18 RX ADMIN — ESCITALOPRAM OXALATE 10 MG: 10 TABLET ORAL at 13:16

## 2021-12-18 RX ADMIN — Medication 500 MG: at 04:30

## 2021-12-18 RX ADMIN — TRAZODONE HYDROCHLORIDE 100 MG: 100 TABLET ORAL at 22:49

## 2021-12-18 RX ADMIN — MAGNESIUM CITRATE 296 ML: 1.75 LIQUID ORAL at 18:48

## 2021-12-18 RX ADMIN — CEFTRIAXONE SODIUM 1000 MG: 1 INJECTION, POWDER, FOR SOLUTION INTRAMUSCULAR; INTRAVENOUS at 06:16

## 2021-12-18 RX ADMIN — POLYETHYLENE GLYCOL 3350 17 G: 17 POWDER, FOR SOLUTION ORAL at 09:42

## 2021-12-18 RX ADMIN — ONDANSETRON 4 MG: 2 INJECTION INTRAMUSCULAR; INTRAVENOUS at 20:41

## 2021-12-18 RX ADMIN — GUAIFENESIN DEXTROMETHORPHAN HYDROBROMIDE ORAL SOLUTION 10 ML: 200; 20 SOLUTION ORAL at 17:25

## 2021-12-18 RX ADMIN — SODIUM CHLORIDE: 9 INJECTION, SOLUTION INTRAVENOUS at 06:17

## 2021-12-18 RX ADMIN — ATORVASTATIN CALCIUM 20 MG: 20 TABLET, FILM COATED ORAL at 13:55

## 2021-12-18 RX ADMIN — SUCRALFATE 1 G: 1 TABLET ORAL at 09:38

## 2021-12-18 RX ADMIN — METOCLOPRAMIDE 5 MG: 10 TABLET ORAL at 06:16

## 2021-12-18 RX ADMIN — GABAPENTIN 300 MG: 300 CAPSULE ORAL at 22:49

## 2021-12-18 RX ADMIN — CLONAZEPAM 0.5 MG: 1 TABLET ORAL at 11:56

## 2021-12-18 RX ADMIN — PANTOPRAZOLE SODIUM 40 MG: 40 TABLET, DELAYED RELEASE ORAL at 09:37

## 2021-12-18 RX ADMIN — GABAPENTIN 300 MG: 300 CAPSULE ORAL at 13:16

## 2021-12-18 RX ADMIN — MORPHINE SULFATE 2 MG: 4 INJECTION INTRAVENOUS at 13:15

## 2021-12-18 RX ADMIN — CALCIUM 500 MG: 500 TABLET ORAL at 22:49

## 2021-12-18 RX ADMIN — METOCLOPRAMIDE 5 MG: 10 TABLET ORAL at 11:56

## 2021-12-18 RX ADMIN — PANTOPRAZOLE SODIUM 40 MG: 40 TABLET, DELAYED RELEASE ORAL at 22:49

## 2021-12-18 RX ADMIN — LACTULOSE 20 G: 20 SOLUTION ORAL at 09:38

## 2021-12-18 RX ADMIN — BUSPIRONE HYDROCHLORIDE 10 MG: 10 TABLET ORAL at 13:55

## 2021-12-18 RX ADMIN — METOCLOPRAMIDE 5 MG: 10 TABLET ORAL at 17:26

## 2021-12-18 RX ADMIN — OXYBUTYNIN CHLORIDE 5 MG: 5 TABLET ORAL at 13:55

## 2021-12-18 RX ADMIN — BUSPIRONE HYDROCHLORIDE 10 MG: 10 TABLET ORAL at 22:49

## 2021-12-18 RX ADMIN — FERROUS SULFATE TAB EC 325 MG (65 MG FE EQUIVALENT) 325 MG: 325 (65 FE) TABLET DELAYED RESPONSE at 13:55

## 2021-12-18 RX ADMIN — SUCRALFATE 1 G: 1 TABLET ORAL at 17:26

## 2021-12-18 RX ADMIN — ENOXAPARIN SODIUM 40 MG: 100 INJECTION SUBCUTANEOUS at 09:39

## 2021-12-18 RX ADMIN — QUETIAPINE FUMARATE 100 MG: 100 TABLET ORAL at 22:49

## 2021-12-18 RX ADMIN — CEFTRIAXONE SODIUM 1000 MG: 1 INJECTION, POWDER, FOR SOLUTION INTRAMUSCULAR; INTRAVENOUS at 11:57

## 2021-12-18 RX ADMIN — BUDESONIDE AND FORMOTEROL FUMARATE DIHYDRATE 2 PUFF: 160; 4.5 AEROSOL RESPIRATORY (INHALATION) at 22:57

## 2021-12-18 RX ADMIN — IOPAMIDOL 75 ML: 755 INJECTION, SOLUTION INTRAVENOUS at 00:18

## 2021-12-18 RX ADMIN — SUCRALFATE 1 G: 1 TABLET ORAL at 13:55

## 2021-12-18 RX ADMIN — Medication 500 MG: at 13:17

## 2021-12-18 RX ADMIN — CALCIUM 500 MG: 500 TABLET ORAL at 11:57

## 2021-12-18 RX ADMIN — LACTULOSE 20 G: 20 SOLUTION ORAL at 13:56

## 2021-12-18 ASSESSMENT — ENCOUNTER SYMPTOMS
DIARRHEA: 0
SHORTNESS OF BREATH: 1
CONSTIPATION: 1
RESPIRATORY NEGATIVE: 1
NAUSEA: 1
ABDOMINAL PAIN: 1
VOMITING: 0
BACK PAIN: 0
COUGH: 1
EYES NEGATIVE: 1
WHEEZING: 0
RHINORRHEA: 0

## 2021-12-18 ASSESSMENT — PAIN SCALES - GENERAL
PAINLEVEL_OUTOF10: 0
PAINLEVEL_OUTOF10: 7

## 2021-12-18 NOTE — ED PROVIDER NOTES
[facility]     Emergency Department     Faculty Attestation    I performed a history and physical examination of the patient and discussed management with the resident. I reviewed the residents note and agree with the documented findings and plan of care. Any areas of disagreement are noted on the chart. I was personally present for the key portions of any procedures. I have documented in the chart those procedures where I was not present during the key portions. I have reviewed the emergency nurses triage note. I agree with the chief complaint, past medical history, past surgical history, allergies, medications, social and family history as documented unless otherwise noted below. For Physician Assistant/ Nurse Practitioner cases/documentation I have personally evaluated this patient and have completed at least one if not all key elements of the E/M (history, physical exam, and MDM). Additional findings are as noted. I have personally seen and evaluated the patient. I find the patient's history and physical exam are consistent with the NP/PA documentation. I agree with the care provided, treatment rendered, disposition and follow-up plan. No bowel movement for 3 to 4 days now with abdominal discomfort for the last day the patient is also noted noted to felt warm on examination with a distended tympanic abdomen consistent with obstruction CT pending at this time. Critical Care     Ting Mccabe M.D.   Attending Emergency  Physician              Sanya Iyer MD  12/17/21 5022

## 2021-12-18 NOTE — ED PROVIDER NOTES
Faculty Sign-Out Attestation  Handoff taken on the following patient from prior Attending Physician: Myriam    I was available and discussed any additional care issues that arose and coordinated the management plans with the resident(s) caring for the patient during my duty period. Any areas of disagreement with residents documentation of care or procedures are noted on the chart. I was personally present for the key portions of any/all procedures during my duty period. I have documented in the chart those procedures where I was not present during the key portions.     Constipation, ct abdomen pending, will need probable admit    Todd Vizcaino DO  Attending Physician     Todd Vizcaino,   12/18/21 0017    Admitted, ileus / pneumonia / abx     Todd Vizcaino, DO  12/18/21 9345

## 2021-12-18 NOTE — ED NOTES
Pt c/o UTI symptoms. Pain with urination, bladder spasms and feelings of fullness. Urine sample collected from pt and admitting team contacted.       Princess Kayla RN  12/18/21 9931

## 2021-12-18 NOTE — ED PROVIDER NOTES
[facility]  ED  Emergency Department Encounter  Emergency Medicine Resident     Pt Name: Leonid Gregorio  MRN: [id number]  [place] 1945  Date of evaluation: 12/17/21  PCP:  Jeff Samayoa MD    [address] [place] [address]       Chief Complaint   Patient presents with    Constipation       HISTORY [facility]  (Location/Symptom, Timing/Onset, Context/Setting, Quality, Duration, Modifying Factors,Severity.)      Leonid Gregorio is a 68 y.o. female who presents from [facility] skilled nursing facility where she is a permanent resident with abdominal pain and concerns of bowel obstruction. Patient states last bowel movement was on Tuesday, 3 days ago. She has not passed any flatus or any form of stool. KUB was performed at the facility and there was some concern of obstruction. She was given an enema without success. Patient also reports cough for approximately a week with myalgias and mild shortness of breath. No known history of COPD or asthma. She did formerly smoke for several years. Abdominal pain is diffuse, worse on the sides by report. PAST MEDICAL / SURGICAL / SOCIAL / FAMILY HISTORY      has a past medical history of Anxiety, Arthritis, Back pain, Bronchitis, Caffeine use, Carpal tunnel syndrome, Cataracts, bilateral, Constipation, Depression, Diarrhea, GERD (gastroesophageal reflux disease), H/O gastric ulcer, Hyperlipidemia, Hypertension, Mumps, MVP (mitral valve prolapse), Recurrent UTI, Sinusitis, Tracheostomy in place [facility]), Ulcerative esophagitis, and Wellness examination. has a past surgical history that includes Hysterectomy; total nephrectomy; Tonsillectomy; Appendectomy; Cystoscopy; Ovary removal; Tubal ligation; rhinoplasty; Carpal tunnel release; Hand surgery; Total hip arthroplasty; eye surgery; Colonoscopy; joint replacement (Right); Gastric fundoplication (N/A, 38/11/1702);  cath power picc triple (03/04/2020);  Upper gastrointestinal endoscopy (N/A, 03/17/2020); tracheostomy (N/A, 03/27/2020); Gastrostomy tube placement (N/A, 03/27/2020); tracheostomy (N/A, 04/02/2020); Upper gastrointestinal endoscopy (N/A, 05/18/2020); Upper gastrointestinal endoscopy (05/18/2020); ERCP (05/21/2020); ERCP (05/21/2020); ERCP (05/21/2020); ERCP (05/21/2020); Cholecystectomy, laparoscopic (N/A, 05/22/2020); Upper gastrointestinal endoscopy (N/A, 07/06/2020); Upper gastrointestinal endoscopy (N/A, 11/09/2020); Upper gastrointestinal endoscopy (02/08/2021); Gastrostomy tube placement (N/A, 2/8/2021); ERCP (N/A, 2/8/2021); ERCP (2/8/2021); and Upper gastrointestinal endoscopy (N/A, 11/24/2021). Social History     Socioeconomic History    Marital status:      Spouse name: Not on file    Number of children: 2    Years of education: Not on file    Highest education level: Not on file   Occupational History    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   Tobacco Use    Smoking status: Former Smoker     Packs/day: 1.00     Years: 50.00     Pack years: 50.00     Types: Cigarettes    Smokeless tobacco: Never Used    Tobacco comment: quit 1 year ago    Vaping Use    Vaping Use: Former    Substances: Always   Substance and Sexual Activity    Alcohol use: No    Drug use: No    Sexual activity: Never   Other Topics Concern    Not on file   Social History Narrative    Not on file     Social Determinants of Health     Financial Resource Strain:     Difficulty of Paying Living Expenses: Not on file   Food Insecurity:     Worried About [address] in the Last Year: Not on file    [address] in the Last Year: Not on file   Transportation Needs:     Lack of Transportation (Medical): Not on file    Lack of Transportation (Non-Medical):  Not on file   Physical Activity:     Days of Exercise per Week: Not on file    Minutes of Exercise per Session: Not on file   Stress:     Feeling of Stress : Not on file   Social Connections:     Frequency of Communication with Friends and Family: Not on file    Frequency of Social Gatherings with Friends and Family: Not on file    Attends Hindu Services: Not on file    Active Member of Clubs or Organizations: Not on file    Attends Club or Organization Meetings: Not on file    Marital Status: Not on file   Intimate Partner Violence:     Fear of Current or Ex-Partner: Not on file    Emotionally Abused: Not on file    Physically Abused: Not on file    Sexually Abused: Not on file   Housing Stability:     Unable to Pay for Housing in the Last Year: Not on file    Number of [place] in the Last Year: Not on file    Unstable Housing in the Last Year: Not on file       Family History   Problem Relation Age of Onset    Cancer Mother         uterine    Heart Attack Mother     Heart Attack Father     Other Maternal Grandfather         foot gangrene    Other Paternal Grandmother         bleeding ulcers    Other Paternal Grandfather         lung cancer        Allergies:  Prednisone, Compazine [prochlorperazine maleate], Penicillin v potassium, and Penicillins    Home Medications:  Prior to Admission medications    Medication Sig Start Date End Date Taking?  Authorizing Provider   busPIRone (BUSPAR) 10 MG tablet Take 1 tablet by mouth 2 times daily 11/30/21   Duke Ruvalcaba MD   dextromethorphan-guaiFENesin (ROBITUSSIN-DM)  MG/5ML syrup Take 10 mLs by mouth every 4 hours as needed for Cough 11/30/21   Duke Ruvalcaba MD   oxybutynin (DITROPAN) 5 MG tablet Take 1 tablet by mouth 2 times daily as needed (BLADDER SPASMS) 11/30/21 12/15/21  Duke Ruvalcaba MD   pantoprazole (PROTONIX) 40 MG tablet Take 1 tablet by mouth 2 times daily 11/25/21 12/25/21  Jc Guevara MD   QUEtiapine (SEROQUEL) 25 MG tablet Take 1 tablet by mouth 2 times daily for 7 days 11/25/21 12/2/21  Jc Guevara MD   traZODone (DESYREL) 100 MG tablet Take 1 tablet by mouth nightly for 7 days 11/25/21 12/2/21  Jc Guevara MD   bisacodyl (DULCOLAX) 10 MG suppository Place 10 mg rectally daily as needed for Constipation    Historical Provider, MD   Sodium Phosphates (FLEET) 7-19 GM/118ML Place 1 enema rectally As needed after no BM for 5 days    Historical Provider, MD   clotrimazole-betamethasone (LOTRISONE) 1-0.05 % cream Apply topically 2 times daily Apply topically 2 times daily. Historical Provider, MD   magnesium hydroxide (MILK OF MAGNESIA) 400 MG/5ML suspension Take by mouth daily as needed for Constipation    Historical Provider, MD   polyethylene glycol (GLYCOLAX) 17 g packet Take 17 g by mouth every other day    Historical Provider, MD   metoclopramide (REGLAN) 5 MG tablet Take 5 mg by mouth 3 times daily (before meals) For nausea    Historical Provider, MD   budesonide-formoterol (SYMBICORT) 160-4.5 MCG/ACT AERO Inhale 2 puffs into the lungs 2 times daily    Historical Provider, MD   ferrous sulfate (FE TABS 325) 325 (65 Fe) MG EC tablet Take 1 tablet by mouth daily (with breakfast) 12/16/20   MARGOTH Vaca NP   furosemide (LASIX) 20 MG tablet Take 1 tablet by mouth daily 12/16/20   MARGOTH Vaca NP   amitriptyline (ELAVIL) 25 MG tablet Take 1 tablet by mouth nightly 7/14/20   Michaela Jha MD   metoprolol succinate (TOPROL XL) 25 MG extended release tablet Take 1 tablet by mouth daily 5/30/20   Chuck Montero MD   sucralfate (CARAFATE) 1 GM tablet Take 1 tablet by mouth 4 times daily 4/6/20   Aminata Bonilla MD   RA VITAMIN D-3 50 MCG (2000 UT) CAPS take 1 capsule by mouth once daily 2/14/20   Historical Provider, MD   Oyster Shell 500 MG TABS Take 500 mg by mouth 2 times daily     Historical Provider, MD   calcium carbonate (TUMS) 500 MG chewable tablet Take 1 tablet by mouth 3 times daily as needed for Heartburn    Historical Provider, MD   simvastatin (ZOCOR) 40 MG tablet Take 40 mg by mouth nightly.     Historical Provider, MD       REVIEW OFSYSTEMS    (2-9 systems for level 4, 10 or more for level 5)      Review of Systems Constitutional: Negative for chills and fever. HENT: Negative for congestion and rhinorrhea. Eyes: Negative for visual disturbance. Respiratory: Positive for cough and shortness of breath. Negative for wheezing. Cardiovascular: Negative for chest pain and leg swelling. Gastrointestinal: Positive for abdominal pain, constipation and nausea. Negative for diarrhea and vomiting. Genitourinary: Negative for dysuria. Musculoskeletal: Negative for back pain and neck pain. Skin: Negative for rash. Neurological: Positive for headaches. Negative for weakness and numbness. PHYSICAL EXAM   (up to 7 for level 4, 8 or more forlevel 5)      INITIAL VITALS:   ED Triage Vitals [12/17/21 2144]   BP Temp Temp Source Pulse Resp SpO2 Height Weight   120/60 99.3 °F (37.4 °C) Oral 108 20 94 % 5' 2\" (1.575 m) 180 lb (81.6 kg)       Physical Exam  Constitutional:       General: She is not in acute distress. Appearance: Normal appearance. She is normal weight. She is not ill-appearing, toxic-appearing or diaphoretic. HENT:      Head: Normocephalic and atraumatic. Nose: Nose normal.      Mouth/Throat:      Mouth: Mucous membranes are moist.      Pharynx: Oropharynx is clear. No oropharyngeal exudate or posterior oropharyngeal erythema. Eyes:      Extraocular Movements: Extraocular movements intact. Pupils: Pupils are equal, round, and reactive to light. Cardiovascular:      Rate and Rhythm: Normal rate and regular rhythm. Heart sounds: Normal heart sounds. No murmur heard. Pulmonary:      Effort: Pulmonary effort is normal. No respiratory distress. Breath sounds: Normal breath sounds. No wheezing, rhonchi or rales. Musculoskeletal:         General: No tenderness. Normal range of motion. Cervical back: Normal range of motion and neck supple. Right lower leg: No edema. Left lower leg: No edema. Skin:     General: Skin is warm and dry.    Neurological:      General: No focal deficit present. Mental Status: She is alert and oriented to person, place, and time. Motor: No weakness.    Psychiatric:         Mood and Affect: Mood normal.         DIFFERENTIAL  DIAGNOSIS     PLAN (LABS / IMAGING / EKG):  Orders Placed This Encounter   Procedures    COVID-19, Rapid    CT ABDOMEN PELVIS W IV CONTRAST Additional Contrast? None    XR CHEST PORTABLE    CBC Auto Differential    Basic Metabolic Panel w/ Reflex to MG    Hepatic Function Panel    LACTIC ACID, WHOLE BLOOD    LIPASE    Basic Metabolic Panel w/ Reflex to MG    CBC    Protime-INR    Lactic Acid, Plasma    Diet NPO    Vital signs per unit routine    Notify physician    Up with assistance    Daily weights    Intake and output    Place intermittent pneumatic compression device    Full Code    Inpatient consult to Hospitalist    Contact isolation    OT eval and treat    PT evaluation and treat    Initiate Oxygen Therapy Protocol    Pulse Oximetry Spot Check    PATIENT STATUS (FROM ED OR OR/PROCEDURAL) Inpatient       MEDICATIONS ORDERED:  Orders Placed This Encounter   Medications    morphine injection 2 mg    ondansetron (ZOFRAN) injection 4 mg    0.9 % sodium chloride bolus    iopamidol (ISOVUE-370) 76 % injection 75 mL    DISCONTD: fleet rectal enema 1 enema    cefTRIAXone (ROCEPHIN) 1000 mg IVPB in 50 mL D5W minibag     Order Specific Question:   Antimicrobial Indications     Answer:   Pneumonia (CAP)    azithromycin (ZITHROMAX) 500 mg in dextrose 5% 250 mL IVPB     Order Specific Question:   Antimicrobial Indications     Answer:   Pneumonia (CAP)    busPIRone (BUSPAR) tablet 10 mg    amitriptyline (ELAVIL) tablet 25 mg    ferrous sulfate (FE TABS 325) EC tablet 325 mg    furosemide (LASIX) tablet 20 mg    metoclopramide (REGLAN) tablet 5 mg    metoprolol succinate (TOPROL XL) extended release tablet 25 mg    oxybutynin (DITROPAN) tablet 5 mg    calcium elemental (OSCAL) tablet 500 0730   Fri Dec 17, 2021   2252 SARS-CoV-2, Rapid: Not Detected [JS]   2253 Creatinine slightly elevated at 0.92, from baseline of around 0.72. [JS]   2253 Lactic Acid, Whole Blood(!): 3.1 [JS]   2320 XR CHEST PORTABLE  IMPRESSION:  Progression of multifocal airspace opacities most confluent within both upper  lungs worrisome for developing pneumonia     Follow-up to assure resolution following medical treatment course is  recommended. [JS]   Sat Dec 18, 2021   0047 CT ABDOMEN PELVIS W IV CONTRAST Additional Contrast? None  IMPRESSION:  1. Mildly dilated transverse colon without associated obstruction. Findings  likely reflect ileus. Mild to moderate amount stool in the colon. No small  bowel obstruction. 2. Trace bilateral pleural effusions. Bibasilar atelectasis versus pneumonia. 3. Mild wall thickening of the distal esophagus and GE junction with dilated  upstream esophagus. Recommend correlation with endoscopy. 4. Mild hepatic steatosis. [JS]      ED Course User Index  [JS] Patti Furth, DO      Attempt was made for bowel movement after soapsuds enema. Patient was still unsuccessful with this. With concerns of ileus, possible pneumonia, will have the patient admitted to the hospital for further treatment and observation. [facility] was consulted and accepted the patient. She was started on ceftriaxone and azithromycin here in the emergency department.        DIAGNOSTIC RESULTS / EMERGENCYDEPARTMENT COURSE / MDM     LABS:  Labs Reviewed   CBC WITH AUTO DIFFERENTIAL - Abnormal; Notable for the following components:       Result Value    Hemoglobin 11.6 (*)     RDW 16.2 (*)     Seg Neutrophils 75 (*)     Lymphocytes 14 (*)     Immature Granulocytes 1 (*)     All other components within normal limits   BASIC METABOLIC PANEL W/ REFLEX TO MG FOR LOW K - Abnormal; Notable for the following components:    Glucose 224 (*)     CREATININE 0.92 (*)     Calcium 8.4 (*)     CO2 17 (*)     GFR Non- 59 (*)     All other components within normal limits   HEPATIC FUNCTION PANEL - Abnormal; Notable for the following components:    Albumin 3.3 (*)     Alkaline Phosphatase 148 (*)     Total Bilirubin 0.26 (*)     All other components within normal limits   LACTIC ACID, WHOLE BLOOD - Abnormal; Notable for the following components:    Lactic Acid, Whole Blood 3.1 (*)     All other components within normal limits   COVID-19, RAPID   LIPASE   LACTIC ACID, PLASMA           CT ABDOMEN PELVIS W IV CONTRAST Additional Contrast? None    Result Date: 12/18/2021  EXAMINATION: CT OF THE ABDOMEN AND PELVIS WITH CONTRAST 12/18/2021 12:04 am TECHNIQUE: CT of the abdomen and pelvis was performed with the administration of intravenous contrast. Multiplanar reformatted images are provided for review. Dose modulation, iterative reconstruction, and/or weight based adjustment of the mA/kV was utilized to reduce the radiation dose to as low as reasonably achievable. COMPARISON: 11/23/2021 HISTORY: ORDERING SYSTEM PROVIDED HISTORY: concern for bowel obstruction TECHNOLOGIST PROVIDED HISTORY: concern for bowel obstruction Decision Support Exception - unselect if not a suspected or confirmed emergency medical condition->Emergency Medical Condition (MA) Reason for Exam: Concern for bowel obstruction FINDINGS: Lower Chest: Trace bilateral pleural effusions. Consolidative opacities in the lung bases. The distal esophagus is dilated and fluid filled. There is mild wall thickening in the distal esophagus extending into the GE junction. Small hiatal hernia. Organs: The liver is decreased in attenuation. The liver, spleen, pancreas and adrenal glands are without focal abnormality. Status post cholecystectomy. Stable very mild biliary duct dilation. The right kidney is not visualized. No left renal calculus or hydronephrosis. There is a subcentimeter left renal cyst. GI/Bowel: Mildly dilated transverse colon measuring up to 7 cm.   No other dilated loops of bowel or bowel wall thickening. Moderate amount of stool in the colon. The appendix is not visualized. No free air. Pelvis: No bladder wall thickening. No pathologically enlarged adenopathy or free fluid. Status post hysterectomy. Peritoneum/Retroperitoneum: The aorta is normal in caliber with mild atherosclerosis. The celiac axis, SMA and CHARLEY are patent. The portal venous system is patent. No pathologically enlarged adenopathy. No ascites or drainable fluid collection. Bones/Soft Tissues: Postsurgical changes from right hip arthroplasty. No acute findings in the bones or soft tissues. 1. Mildly dilated transverse colon without associated obstruction. Findings likely reflect ileus. Mild-to-moderate amount of stool in the colon. No small bowel obstruction. 2. Trace bilateral pleural effusions. Bibasilar atelectasis versus pneumonia. 3. Mild wall thickening of the distal esophagus and GE junction with dilated upstream esophagus. Recommend correlation with endoscopy. 4. Mild hepatic steatosis. RECOMMENDATIONS: Unavailable     XR CHEST PORTABLE    Result Date: 12/17/2021  EXAMINATION: ONE XRAY VIEW OF THE CHEST 12/17/2021 10:56 pm COMPARISON: Chest x-ray 11/23/2021 and chest CT performed 11/24/2021 HISTORY: ORDERING SYSTEM PROVIDED HISTORY: cough, body aches, shortness of breath TECHNOLOGIST PROVIDED HISTORY: cough, body aches, shortness of breath Reason for Exam: Upright port, cough, body aches, abd pain FINDINGS: The cardiomediastinal silhouette is mildly enlarged. Patchy bibasilar and upper lung opacities most confluent within right upper lung worrisome for multifocal pneumonia. Chronic changes within right lung base not well visualize. Isaac Power No pleural effusion or pneumothorax is present.      Progression of multifocal airspace opacities most confluent within both upper lungs worrisome for developing pneumonia Follow-up to assure resolution following medical treatment course is recommended. PROCEDURES:  None    CONSULTS:  IP CONSULT TO HOSPITALIST    CRITICAL CARE:  Please see attending note    FINAL IMPRESSION      1. Ileus ([address]    2. Other constipation    3. Pneumonia of both upper lobes due to infectious organism          DISPOSITION / PLAN     DISPOSITION Admitted 12/18/2021 07:16:35 AM      PATIENT REFERRED TO:  No follow-up provider specified.     DISCHARGE MEDICATIONS:  New Prescriptions    No medications on file       Khanh Mueller DO  Emergency Medicine Resident    (Please note that portions of this note were completed with a voice recognition program.Efforts were made to edit the dictations but occasionally words are mis-transcribed.)        Khanh Mueller DO  Resident  12/18/21 2144

## 2021-12-18 NOTE — H&P
facility. A KUB was performed there and concern was raised for obstruction. While in ED, lactic acid was noted to be 3.1. Chest x-ray revealed progression of multifocal airspace opacities most confluent within both upper lungs worrisome for developing pneumonia. CT the abdomen pelvis with IV contrast was completed and revealed mildly dilated transverse colon without obstruction with findings likely reflective of ileus. Mild to moderate amount of stool in the colon and no small bowel obstruction. She has been admitted to the hospital for further observation and management of ileus.             Past Medical History:     Past Medical History:   Diagnosis Date    Anxiety     Arthritis     Back pain     Bronchitis     Caffeine use     8 coffee / day    Carpal tunnel syndrome     Cataracts, bilateral     Constipation     Depression     Diarrhea     GERD (gastroesophageal reflux disease)     H/O gastric ulcer     Hyperlipidemia     Hypertension     Mumps     MVP (mitral valve prolapse)     Dr. Ildefonso Treadwell in May 2019    Recurrent UTI     Dr. Nicole Slipper    Sinusitis     Tracheostomy in place [facility])     Ulcerative esophagitis     Wellness examination     Dr. Yany Gamez seen in Jan 2020        Past Surgical History:     Past Surgical History:   Procedure Laterality Date    APPENDECTOMY      CARPAL TUNNEL RELEASE      CHOLECYSTECTOMY, LAPAROSCOPIC N/A 05/22/2020    XI LAPAROSCOPIC ROBOTIC CHOLECYSTECTOMY, PYLOROPLASTY performed by Sadiq Johnson MD at [facility] ERCP  05/21/2020    with stent insertion, balloon dilation, sphinctereotomy    ERCP  05/21/2020    ** CASE IN OR WITY GI STAFF** ERCP STENT INSERTION performed by Miracle Clarke MD at [facility]    ERCP  05/21/2020    ** CASE IN OR WITH GI STAFF**ERCP SPHINCTER/PAPILLOTOMY performed by Miracle Clarke MD at [facility]    ERCP  05/21/2020    ** CASE IN OR WITH GI STAFF**ERCP DILATION BALLOON performed by Steven Loza MD at [facility]    ERCP N/A 2/8/2021    ERCP STENT REMOVAL performed by Jose Horn MD at [facility]    ERCP  2/8/2021    ERCP STONE REMOVAL performed by Jose Horn MD at [address]      patricia IOL    GASTRIC FUNDOPLICATION N/A 67/05/8334    XI LAPAROSCOPIC ROBOTIC HIATAL HERNIA REPAIR, CHERYL FUNDOPLICATION performed by Mary Khan MD at [address] 03/27/2020    EGD PEG TUBE PLACEMENT performed by Mary Khan MD at [place] [address] N/A 2/8/2021    **CASE IN O.R. W/ GI STAFF - EGD WITH REMOVAL PEG TUBE performed by Jose Horn MD at [facility]    HAND SURGERY      [facility]. CATH POWER PICC TRIPLE  03/04/2020         HYSTERECTOMY      Abdominal    JOINT REPLACEMENT Right     right hip    OVARY REMOVAL      RHINOPLASTY      TONSILLECTOMY      TOTAL HIP ARTHROPLASTY      TOTAL NEPHRECTOMY      atrophic from infections, age 13   [place] TRACHEOSTOMY N/A 03/27/2020    **ADD ON **WANTS 10:00AM **TRACHEOTOMY performed by Mary Khan MD at [address] [facility] 04/02/2020    TRACHEOTOMY EXCHANGE performed by Mary Khan MD at [address] [place] [address] N/A 03/17/2020    BEDSIDE EGD ESOPHAGOGASTRODUODENOSCOPY (ICU) performed by Mary Khan MD at [address] [place] [address] N/A 05/18/2020    EGD BIOPSY performed by Steven Loza MD at [address] [place] [address]  05/18/2020    EGD DILATION BALLOON performed by Steven Loza MD at [address] [place] [address] N/A 07/06/2020    ** CASE IN O.R. WITH GI STAFF** EGD ESOPHAGOGASTRODUODENOSCOPY performed by Barbara Shah MD at [address] [place] [address] [facility] 11/09/2020    EGD DIAGNOSTIC ONLY performed by Leidy Joe MD at [address] [place] [address]  02/08/2021    - EGD WITH REMOVAL PEG TUBE,ERCP STENT REMOVAL,ERCP STONE REMOVAL    UPPER GASTROINTESTINAL ENDOSCOPY N/A 11/24/2021    EGD BIOPSY performed by Naila Ramirez MD at [facility]        Medications Prior to Admission:     Prior to Admission medications    Medication Sig Start Date End Date Taking? Authorizing Provider   busPIRone (BUSPAR) 10 MG tablet Take 1 tablet by mouth 2 times daily 11/30/21   Delores Barros MD   dextromethorphan-guaiFENesin (ROBITUSSIN-DM)  MG/5ML syrup Take 10 mLs by mouth every 4 hours as needed for Cough 11/30/21   Delores Barros MD   oxybutynin (DITROPAN) 5 MG tablet Take 1 tablet by mouth 2 times daily as needed (BLADDER SPASMS) 11/30/21 12/15/21  Delores Barros MD   pantoprazole (PROTONIX) 40 MG tablet Take 1 tablet by mouth 2 times daily 11/25/21 12/25/21  Pedro Espinal MD   QUEtiapine (SEROQUEL) 25 MG tablet Take 1 tablet by mouth 2 times daily for 7 days 11/25/21 12/2/21  Pedro Espinal MD   traZODone (DESYREL) 100 MG tablet Take 1 tablet by mouth nightly for 7 days 11/25/21 12/2/21  Pedro Espinal MD   bisacodyl (DULCOLAX) 10 MG suppository Place 10 mg rectally daily as needed for Constipation    Historical Provider, MD   Sodium Phosphates (FLEET) 7-19 GM/118ML Place 1 enema rectally As needed after no BM for 5 days    Historical Provider, MD   clotrimazole-betamethasone (LOTRISONE) 1-0.05 % cream Apply topically 2 times daily Apply topically 2 times daily.     Historical Provider, MD   magnesium hydroxide (MILK OF MAGNESIA) 400 MG/5ML suspension Take by mouth daily as needed for Constipation    Historical Provider, MD   polyethylene glycol (GLYCOLAX) 17 g packet Take 17 g by mouth every other day    Historical Provider, MD   metoclopramide (REGLAN) 5 MG tablet Take 5 mg by mouth 3 times daily (before meals) For nausea    Historical Provider, MD   budesonide-formoterol (SYMBICORT) 160-4.5 MCG/ACT AERO Inhale 2 puffs into the lungs 2 times daily    Historical Provider, MD   ferrous sulfate (FE TABS 325) 325 (65 Fe) MG EC tablet Take 1 tablet by mouth daily (with breakfast) 12/16/20   MARGOTH Kidd NP   furosemide (LASIX) 20 MG tablet Take 1 tablet by mouth daily 12/16/20   MARGOTH Kidd - NP   amitriptyline (ELAVIL) 25 MG tablet Take 1 tablet by mouth nightly 7/14/20   Maria De Jesus Blood MD   metoprolol succinate (TOPROL XL) 25 MG extended release tablet Take 1 tablet by mouth daily 5/30/20   Sanya Martin MD   sucralfate (CARAFATE) 1 GM tablet Take 1 tablet by mouth 4 times daily 4/6/20   Jaz Jim MD RA VITAMIN D-3 50 MCG (2000 UT) CAPS take 1 capsule by mouth once daily 2/14/20   Historical Provider, MD   Oyster Shell 500 MG TABS Take 500 mg by mouth 2 times daily     Historical Provider, MD   calcium carbonate (TUMS) 500 MG chewable tablet Take 1 tablet by mouth 3 times daily as needed for Heartburn    Historical Provider, MD   simvastatin (ZOCOR) 40 MG tablet Take 40 mg by mouth nightly. Historical Provider, MD        Allergies:     Prednisone, Compazine [prochlorperazine maleate], Penicillin v potassium, and Penicillins    Social History:     Tobacco:    reports that she has quit smoking. Her smoking use included cigarettes. She has a 50.00 pack-year smoking history. She has never used smokeless tobacco.  Alcohol:      reports no history of alcohol use. Drug Use:  reports no history of drug use. Family History:     Family History   Problem Relation Age of Onset    Cancer Mother         uterine    Heart Attack Mother     Heart Attack Father     Other Maternal Grandfather         foot gangrene    Other Paternal Grandmother         bleeding ulcers    Other Paternal Grandfather         lung cancer       Review of Systems:     Positive and Negative as described in HPI. Review of Systems   Constitutional: Positive for appetite change, chills and fever. Negative for fatigue. HENT: Negative. Eyes: Negative. Respiratory: Negative. Cardiovascular: Negative. Gastrointestinal: Positive for abdominal pain, constipation and nausea. Negative for vomiting. Genitourinary: Positive for decreased urine volume. Negative for difficulty urinating and dysuria. Musculoskeletal: Negative. Skin: Negative. Neurological: Negative. Psychiatric/Behavioral: Negative. Physical Exam:   BP (!) 101/53   Pulse 80   Temp 99.3 °F (37.4 °C) (Oral)   Resp 18   Ht 5' 2\" (1.575 m)   Wt 180 lb (81.6 kg)   SpO2 91%   BMI 32.92 kg/m²   Temp (24hrs), Av.3 °F (37.4 °C), Min:99.3 °F (37.4 °C), Max:99.3 °F (37.4 °C)    No results for input(s): POCGLU in the last 72 hours. Intake/Output Summary (Last 24 hours) at 2021 0711  Last data filed at 2021 0055  Gross per 24 hour   Intake 500 ml   Output --   Net 500 ml       Physical Exam  Vitals and nursing note reviewed. Constitutional:       General: She is not in acute distress. Appearance: She is ill-appearing. She is not toxic-appearing or diaphoretic. HENT:      Head: Normocephalic. Right Ear: External ear normal.      Left Ear: External ear normal.      Nose: Nose normal. No rhinorrhea. Eyes:      General: No scleral icterus. Right eye: No discharge. Left eye: No discharge. Extraocular Movements: Extraocular movements intact. Conjunctiva/sclera: Conjunctivae normal.      Pupils: Pupils are equal, round, and reactive to light. Neck:      Comments: No JVD  Cardiovascular:      Rate and Rhythm: Normal rate and regular rhythm. Pulses: Normal pulses. Heart sounds: Normal heart sounds. No murmur heard. No friction rub. No gallop. Pulmonary:      Effort: Pulmonary effort is normal. No respiratory distress. Breath sounds: Normal breath sounds. No wheezing, rhonchi or rales. Abdominal:      General: There is no distension. Palpations: Abdomen is soft. Tenderness: There is abdominal tenderness. There is no guarding. Hernia: No hernia is present. Comments: Absent bowel sounds left quadrants, hyperactive bowel sounds right quadrants   Musculoskeletal:         General: Normal range of motion. Cervical back: Normal range of motion and neck supple. Right lower leg: No edema. Left lower leg: No edema. Skin:     General: Skin is warm. Coloration: Skin is pale. Skin is not jaundiced. Findings: No bruising, erythema, lesion or rash. Neurological:      General: No focal deficit present. Mental Status: She is alert and oriented to person, place, and time. Psychiatric:         Mood and Affect: Mood normal.         Behavior: Behavior normal.         Thought Content:  Thought content normal.         Judgment: Judgment normal.         Investigations:      Laboratory Testing:  Recent Results (from the past 24 hour(s))   CBC Auto Differential    Collection Time: 12/17/21 10:05 PM   Result Value Ref Range    WBC 9.3 3.5 - 11.3 k/uL    RBC 4.23 3.95 - 5.11 m/uL    Hemoglobin 11.6 (L) 11.9 - 15.1 g/dL    Hematocrit 37.1 36.3 - 47.1 %    MCV 87.7 82.6 - 102.9 fL    MCH 27.4 25.2 - 33.5 pg    MCHC 31.3 28.4 - 34.8 g/dL    RDW 16.2 (H) 11.8 - 14.4 %    Platelets 513 205 - 524 k/uL    MPV 11.3 8.1 - 13.5 fL    NRBC Automated 0.0 0.0 per 100 WBC    Differential Type NOT REPORTED     Seg Neutrophils 75 (H) 36 - 65 %    Lymphocytes 14 (L) 24 - 43 %    Monocytes 6 3 - 12 %    Eosinophils % 4 1 - 4 %    Basophils 0 0 - 2 %    Immature Granulocytes 1 (H) 0 %    Segs Absolute 7.00 1.50 - 8.10 k/uL    Absolute Lymph # 1.32 1.10 - 3.70 k/uL    Absolute Mono # 0.52 0.10 - 1.20 k/uL    Absolute Eos # 0.34 0.00 - 0.44 k/uL    Basophils Absolute 0.03 0.00 - 0.20 k/uL    Absolute Immature Granulocyte 0.09 0.00 - 0.30 k/uL    WBC Morphology NOT REPORTED     RBC Morphology ANISOCYTOSIS PRESENT     Platelet Estimate NOT REPORTED    Basic Metabolic Panel w/ Reflex to MG    Collection Time: 12/17/21 10:05 PM   Result Value Ref Range    Glucose 224 (H) 70 - 99 mg/dL    BUN 12 8 - 23 mg/dL    CREATININE 0.92 (H) 0.50 - 0.90 mg/dL    Bun/Cre Ratio NOT REPORTED 9 - 20    Calcium 8.4 (L) 8.6 - 10.4 mg/dL    Sodium 135 135 - 144 mmol/L    Potassium 4.2 3.7 - 5.3 mmol/L    Chloride 105 98 - 107 mmol/L    CO2 17 (L) 20 - 31 mmol/L    Anion Gap 13 9 - 17 mmol/L    GFR Non-African American 59 (L) >60 mL/min    GFR African American >60 >60 mL/min    GFR Comment          GFR Staging NOT REPORTED    Hepatic Function Panel    Collection Time: 12/17/21 10:05 PM   Result Value Ref Range    Albumin 3.3 (L) 3.5 - 5.2 g/dL    Alkaline Phosphatase 148 (H) 35 - 104 U/L    ALT 19 5 - 33 U/L    AST 17 <32 U/L    Total Bilirubin 0.26 (L) 0.3 - 1.2 mg/dL    Bilirubin, Direct <0.08 <0.31 mg/dL    Bilirubin, Indirect Can not be calculated 0.00 - 1.00 mg/dL    Total Protein 6.4 6.4 - 8.3 g/dL    Globulin NOT REPORTED 1.5 - 3.8 g/dL    Albumin/Globulin Ratio 1.1 1.0 - 2.5   COVID-19, Rapid    Collection Time: 12/17/21 10:05 PM    Specimen: Nasopharyngeal Swab   Result Value Ref Range    Specimen Description . NASOPHARYNGEAL SWAB     SARS-CoV-2, Rapid Not Detected Not Detected   LACTIC ACID, WHOLE BLOOD    Collection Time: 12/17/21 10:05 PM   Result Value Ref Range    Lactic Acid, Whole Blood 3.1 (H) 0.7 - 2.1 mmol/L   LIPASE    Collection Time: 12/17/21 10:05 PM   Result Value Ref Range    Lipase 13 13 - 60 U/L       Imaging/Diagnostics:  CT ABDOMEN PELVIS W IV CONTRAST Additional Contrast? None    Result Date: 12/18/2021  1. Mildly dilated transverse colon without associated obstruction. Findings likely reflect ileus. Mild to moderate amount stool in the colon. No small bowel obstruction. 2. Trace bilateral pleural effusions. Bibasilar atelectasis versus pneumonia. 3. Mild wall thickening of the distal esophagus and GE junction with dilated upstream esophagus. Recommend correlation with endoscopy. 4. Mild hepatic steatosis.  RECOMMENDATIONS: Unavailable     XR CHEST PORTABLE    Result Date: 12/17/2021  Progression of multifocal airspace opacities most confluent within both upper lungs worrisome for developing pneumonia Follow-up to assure resolution following medical treatment course is recommended. Assessment :      Hospital Problems           Last Modified POA    * (Principal) Ileus ([address] 12/18/2021 Yes    GERD (gastroesophageal reflux disease) 12/18/2021 Yes    Overview Signed 12/18/2021  7:10 AM by MARGOTH Abraham NP     Last Assessment & Plan:   Formatting of this note might be different from the original.  S/p hydrostatic balloon dilation. Considering POEM  Formatting of this note might be different from the original.  Last Assessment & Plan:   S/p hydrostatic balloon dilation. Considering POEM         Centrilobular emphysema ([address] 12/18/2021 Yes    Essential hypertension 12/18/2021 Yes    Class 1 obesity in adult 12/18/2021 Yes          Plan:     Patient status inpatient in the Med/Surge    1. Ileus: NPO. Lactulose as ordered. Control nausea. Use narcotic sparingly for pain. Reglan as ordered. Encourage ambulation. 2. GERD: PPI daily. Carafate as ordered  3. Emphysema: Supplemental O2 to keep SPO2 greater than 90%. 4. Hypertension: Monitor blood pressure as ordered. 5. Obesity: Weight loss management as outpatient per PCP    Consultations:   IP CONSULT TO HOSPITALIST     Patient is admitted as inpatient status because of co-morbidities listed above, severity of signs and symptoms as outlined, requirement for current medical therapies and most importantly because of direct risk to patient if care not provided in a hospital setting. Expected length of stay > 48 hours.     MARGOTH Abraham NP  12/18/2021  7:11 AM    Copy sent to Dr. Lupe Betancourt MD

## 2021-12-18 NOTE — ED NOTES
Labs obtained from EMS line in [facility]. Sent to lab. Pt. Tolerated well.      Deonte Cortez RN  12/17/21 8310

## 2021-12-18 NOTE — ED NOTES
Pharmacy contacted as I have yet to receive scheduled 0800/0900 medications.       Pamela Mena RN  12/18/21 0011

## 2021-12-18 NOTE — ED NOTES
Admitting team contacted with updated medication list. Pt is requesting her anxiety/depression meds that were not entered into her active MAR. Medication list updated to what was sent from skilled nursing facility.       Donya Epstein RN  12/18/21 1398

## 2021-12-18 NOTE — ED NOTES
Pt called out. Pt asking for pain medication and a room. Pt updated on bed status, awaiting bed placement up stairs. Pt asking for a tv to help her dose off and sleep.       Donya Epstein RN  12/18/21 0839

## 2021-12-18 NOTE — ED PROVIDER NOTES
[address]  ED  Emergency Department Encounter  Emergency Medicine Resident     Pt Name: Sandra Smith  MRN: [id number]  [place] 1945  Date of evaluation: 12/17/21  PCP:  Darryle Norris, MD    [address] [place] [address]       Chief Complaint   Patient presents with    Constipation       HISTORY [facility]  (Location/Symptom, Timing/Onset, Context/Setting, Quality, Duration, Modifying Factors,Severity.)      Sandra Smith is a 68 y.o. female who presents from [facility] skilled nursing facility where she is a permanent resident with abdominal pain and concerns of bowel obstruction. Patient states last bowel movement was on Tuesday, 3 days ago. She has not passed any flatus or any form of stool. KUB was performed at the facility and there was some concern of obstruction. She was given an enema without success. Patient also reports cough for approximately a week with myalgias and mild shortness of breath. No known history of COPD or asthma. She did formerly smoke for several years. Abdominal pain is diffuse, worse on the sides by report. She has felt feverish and chilled at times, states her temperature has not been above 100. PAST MEDICAL / SURGICAL / SOCIAL / FAMILY HISTORY      has a past medical history of Anxiety, Arthritis, Back pain, Bronchitis, Caffeine use, Carpal tunnel syndrome, Cataracts, bilateral, Constipation, Depression, Diarrhea, GERD (gastroesophageal reflux disease), H/O gastric ulcer, Hyperlipidemia, Hypertension, Mumps, MVP (mitral valve prolapse), Recurrent UTI, Sinusitis, Tracheostomy in place [facility]), Ulcerative esophagitis, and Wellness examination. has a past surgical history that includes Hysterectomy; total nephrectomy; Tonsillectomy; Appendectomy; Cystoscopy; Ovary removal; Tubal ligation; rhinoplasty; Carpal tunnel release; Hand surgery; Total hip arthroplasty; eye surgery;  Colonoscopy; joint replacement (Right); Gastric fundoplication (N/A, 02/24/2020);  cath power picc triple (03/04/2020); Upper gastrointestinal endoscopy (N/A, 03/17/2020); tracheostomy (N/A, 03/27/2020); Gastrostomy tube placement (N/A, 03/27/2020); tracheostomy (N/A, 04/02/2020); Upper gastrointestinal endoscopy (N/A, 05/18/2020); Upper gastrointestinal endoscopy (05/18/2020); ERCP (05/21/2020); ERCP (05/21/2020); ERCP (05/21/2020); ERCP (05/21/2020); Cholecystectomy, laparoscopic (N/A, 05/22/2020); Upper gastrointestinal endoscopy (N/A, 07/06/2020); Upper gastrointestinal endoscopy (N/A, 11/09/2020); Upper gastrointestinal endoscopy (02/08/2021); Gastrostomy tube placement (N/A, 2/8/2021); ERCP (N/A, 2/8/2021); ERCP (2/8/2021); and Upper gastrointestinal endoscopy (N/A, 11/24/2021). ***    Social History     Socioeconomic History    Marital status:      Spouse name: Not on file    Number of children: 2    Years of education: Not on file    Highest education level: Not on file   Occupational History    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   Tobacco Use    Smoking status: Former Smoker     Packs/day: 1.00     Years: 50.00     Pack years: 50.00     Types: Cigarettes    Smokeless tobacco: Never Used    Tobacco comment: quit 1 year ago    Vaping Use    Vaping Use: Former    Substances: Always   Substance and Sexual Activity    Alcohol use: No    Drug use: No    Sexual activity: Never   Other Topics Concern    Not on file   Social History Narrative    Not on file     Social Determinants of Health     Financial Resource Strain:     Difficulty of Paying Living Expenses: Not on file   Food Insecurity:     Worried About [address] in the Last Year: Not on file    [address] in the Last Year: Not on file   Transportation Needs:     Lack of Transportation (Medical): Not on file    Lack of Transportation (Non-Medical):  Not on file   Physical Activity:     Days of Exercise per Week: Not on file    Minutes of Exercise per Session: Not on file   Stress:     Feeling of Stress : Not on file   Social Connections:     Frequency of Communication with Friends and Family: Not on file    Frequency of Social Gatherings with Friends and Family: Not on file    Attends Caodaism Services: Not on file    Active Member of Clubs or Organizations: Not on file    Attends Club or Organization Meetings: Not on file    Marital Status: Not on file   Intimate Partner Violence:     Fear of Current or Ex-Partner: Not on file    Emotionally Abused: Not on file    Physically Abused: Not on file    Sexually Abused: Not on file   Housing Stability:     Unable to Pay for Housing in the Last Year: Not on file    Number of [place] in the Last Year: Not on file    Unstable Housing in the Last Year: Not on file       Family History   Problem Relation Age of Onset    Cancer Mother         uterine    Heart Attack Mother     Heart Attack Father     Other Maternal Grandfather         foot gangrene    Other Paternal Grandmother         bleeding ulcers    Other Paternal Grandfather         lung cancer    ***    Allergies:  Prednisone, Compazine [prochlorperazine maleate], Penicillin v potassium, and Penicillins    Home Medications:  Prior to Admission medications    Medication Sig Start Date End Date Taking?  Authorizing Provider   busPIRone (BUSPAR) 10 MG tablet Take 1 tablet by mouth 2 times daily 11/30/21   Tali Jamil MD   dextromethorphan-guaiFENesin (ROBITUSSIN-DM)  MG/5ML syrup Take 10 mLs by mouth every 4 hours as needed for Cough 11/30/21   Tali Jamil MD   oxybutynin (DITROPAN) 5 MG tablet Take 1 tablet by mouth 2 times daily as needed (BLADDER SPASMS) 11/30/21 12/15/21  Tali Jamil MD   pantoprazole (PROTONIX) 40 MG tablet Take 1 tablet by mouth 2 times daily 11/25/21 12/25/21  Kristina Higgins MD   QUEtiapine (SEROQUEL) 25 MG tablet Take 1 tablet by mouth 2 times daily for 7 days 11/25/21 12/2/21  Kristina Higgins MD   traZODone (DESYREL) 100 MG tablet Take 1 tablet by mouth nightly for 7 days 11/25/21 12/2/21  Xavier Mcgee MD   bisacodyl (DULCOLAX) 10 MG suppository Place 10 mg rectally daily as needed for Constipation    Historical Provider, MD   Sodium Phosphates (FLEET) 7-19 GM/118ML Place 1 enema rectally As needed after no BM for 5 days    Historical Provider, MD   clotrimazole-betamethasone (LOTRISONE) 1-0.05 % cream Apply topically 2 times daily Apply topically 2 times daily. Historical Provider, MD   magnesium hydroxide (MILK OF MAGNESIA) 400 MG/5ML suspension Take by mouth daily as needed for Constipation    Historical Provider, MD   polyethylene glycol (GLYCOLAX) 17 g packet Take 17 g by mouth every other day    Historical Provider, MD   metoclopramide (REGLAN) 5 MG tablet Take 5 mg by mouth 3 times daily (before meals) For nausea    Historical Provider, MD   budesonide-formoterol (SYMBICORT) 160-4.5 MCG/ACT AERO Inhale 2 puffs into the lungs 2 times daily    Historical Provider, MD   ferrous sulfate (FE TABS 325) 325 (65 Fe) MG EC tablet Take 1 tablet by mouth daily (with breakfast) 12/16/20   MARGOTH Lee NP   furosemide (LASIX) 20 MG tablet Take 1 tablet by mouth daily 12/16/20   MARGOTH Lee NP   amitriptyline (ELAVIL) 25 MG tablet Take 1 tablet by mouth nightly 7/14/20   Lauren Marrufo MD   metoprolol succinate (TOPROL XL) 25 MG extended release tablet Take 1 tablet by mouth daily 5/30/20   Xavi Shanks MD   sucralfate (CARAFATE) 1 GM tablet Take 1 tablet by mouth 4 times daily 4/6/20   Mancel Hamman, MD   RA VITAMIN D-3 50 MCG (2000 UT) CAPS take 1 capsule by mouth once daily 2/14/20   Historical Provider, MD   Oyster Shell 500 MG TABS Take 500 mg by mouth 2 times daily     Historical Provider, MD   calcium carbonate (TUMS) 500 MG chewable tablet Take 1 tablet by mouth 3 times daily as needed for Heartburn    Historical Provider, MD   simvastatin (ZOCOR) 40 MG tablet Take 40 mg by mouth nightly.     Historical Provider, MD REVIEW OFSYSTEMS    (2-9 systems for level 4, 10 or more for level 5)      Review of Systems   Constitutional: Positive for chills. Negative for fever. HENT: Negative for congestion and rhinorrhea. Eyes: Negative for visual disturbance. Respiratory: Positive for cough. Negative for shortness of breath. Cardiovascular: Negative for chest pain. Gastrointestinal: Positive for abdominal distention, abdominal pain, constipation and nausea. Negative for diarrhea and vomiting. Genitourinary: Negative for dysuria and frequency. Musculoskeletal: Positive for myalgias. Negative for back pain and neck pain. Skin: Negative for rash. Neurological: Positive for headaches. Negative for weakness and numbness. PHYSICAL EXAM   (up to 7 for level 4, 8 or more forlevel 5)      INITIAL VITALS:   ED Triage Vitals [12/17/21 2144]   BP Temp Temp Source Pulse Resp SpO2 Height Weight   120/60 99.3 °F (37.4 °C) Oral 108 20 94 % 5' 2\" (1.575 m) 180 lb (81.6 kg)       Physical Exam  Constitutional:       General: She is not in acute distress. Appearance: Normal appearance. She is obese. She is not ill-appearing, toxic-appearing or diaphoretic. HENT:      Head: Normocephalic and atraumatic. Nose: Nose normal.      Mouth/Throat:      Mouth: Mucous membranes are moist.      Pharynx: Oropharynx is clear. Eyes:      Extraocular Movements: Extraocular movements intact. Pupils: Pupils are equal, round, and reactive to light. Cardiovascular:      Rate and Rhythm: Regular rhythm. Tachycardia present. Heart sounds: Normal heart sounds. No murmur heard. Pulmonary:      Effort: Pulmonary effort is normal. No respiratory distress. Breath sounds: Normal breath sounds. No wheezing, rhonchi or rales. Abdominal:      Comments: Abdomen is distended, tender to palpation diffusely, but more prominent on the right side. Bowel sounds are hyperactive.   Some voluntary guarding, no rebound tenderness. No overlying skin changes. Musculoskeletal:         General: Normal range of motion. Cervical back: Normal range of motion and neck supple. Right lower leg: No edema. Left lower leg: No edema. Skin:     General: Skin is warm and dry. Neurological:      General: No focal deficit present. Mental Status: She is alert and oriented to person, place, and time. Motor: No weakness. Psychiatric:         Mood and Affect: Mood normal.         DIFFERENTIAL  DIAGNOSIS     PLAN (LABS / IMAGING / EKG):  No orders of the defined types were placed in this encounter. MEDICATIONS ORDERED:  No orders of the defined types were placed in this encounter. DDX: Bowel obstruction, intra-abdominal infection, constipation, viral illness, Covid, pneumonia, other    Initial MDM/Plan/ED COURSE:    68 y.o. female who presents with ***     ED Course as of 12/17/21 2329   Fri Dec 17, 2021   2252 SARS-CoV-2, Rapid: Not Detected [JS]   2253 Creatinine slightly elevated at 0.92, from baseline of around 0.72. [JS]   2253 Lactic Acid, Whole Blood(!): 3.1 [JS]   2320 XR CHEST PORTABLE  IMPRESSION:  Progression of multifocal airspace opacities most confluent within both upper  lungs worrisome for developing pneumonia     Follow-up to assure resolution following medical treatment course is  recommended. [JS]      ED Course User Index  [JS] Mayra Mckeon DO    ***:       DIAGNOSTIC RESULTS / EMERGENCYDEPARTMENT COURSE / MDM     LABS:  Labs Reviewed - No data to display        No results found. ***    EKG  ***    All EKG's are interpreted by the Emergency Department Physicianwho either signs or Co-signs this chart in the absence of a cardiologist.      PROCEDURES:  None    CONSULTS:  None    CRITICAL CARE:  Please see attending note    FINAL IMPRESSION      No diagnosis found. ***    DISPOSITION / PLAN     DISPOSITION        PATIENT REFERRED TO:  No follow-up provider specified.     DISCHARGE MEDICATIONS:  New Prescriptions    No medications on file       Ken Mitchell,   Emergency Medicine Resident    (Please note that portions of this note were completed with a voice recognition program.Efforts were made to edit the dictations but occasionally words are mis-transcribed.)

## 2021-12-18 NOTE — ED NOTES
Pt boosted up in bed. Pt remains on 4L NC, pt states she does not wear oxygen at home, o2 saturation drops when pt is sleeping on lying flat. Pt has pure wick in place, readjusted and brief checked. Pt provided cell phone and glasses, states son should be calling soon. Pt has fluids infusing at 75 ml/hr. Pt is belching, abdomen is soft but distended. Pt denies any current needs at this time.       Carlene Huitron RN  12/18/21 6614

## 2021-12-18 NOTE — ED NOTES
Urine sample collected from pt,  labeled and sent to lab for testing.       Pamela Mena RN  12/18/21 5896

## 2021-12-18 NOTE — ED NOTES
Bed: 11  Expected date:   Expected time:   Means of arrival:   Comments:  [facility], [facility]  12/17/21 7711

## 2021-12-18 NOTE — ED NOTES
Pt came to ER yesterday from [facility] in [place]. Pt states she can ambulate with a walker just a few steps at a time. Pt states that she normally has a bowel movement daily after breakfast, has history of diarrhea then constipation and is on a bowel regimen. Pt reports her last bowel movement was Monday and is now distended and painful. Pt states she does not wear oxygen at home, has been requiring supplement oxygen while in the ER at 4L NC. Pt is alert, oriented, speaking in full, complete sentences, no distress noted.  Pt rates pain 7/10     Ubaldo Rodríguez, RN  12/18/21 1542

## 2021-12-19 VITALS
BODY MASS INDEX: 33.13 KG/M2 | DIASTOLIC BLOOD PRESSURE: 56 MMHG | RESPIRATION RATE: 16 BRPM | SYSTOLIC BLOOD PRESSURE: 108 MMHG | TEMPERATURE: 97.9 F | WEIGHT: 180 LBS | HEIGHT: 62 IN | OXYGEN SATURATION: 91 % | HEART RATE: 85 BPM

## 2021-12-19 LAB
ANION GAP SERPL CALCULATED.3IONS-SCNC: 9 MMOL/L (ref 9–17)
BUN BLDV-MCNC: 6 MG/DL (ref 8–23)
BUN/CREAT BLD: ABNORMAL (ref 9–20)
CALCIUM SERPL-MCNC: 8.3 MG/DL (ref 8.6–10.4)
CHLORIDE BLD-SCNC: 106 MMOL/L (ref 98–107)
CO2: 20 MMOL/L (ref 20–31)
CREAT SERPL-MCNC: 0.86 MG/DL (ref 0.5–0.9)
GFR AFRICAN AMERICAN: >60 ML/MIN
GFR NON-AFRICAN AMERICAN: >60 ML/MIN
GFR SERPL CREATININE-BSD FRML MDRD: ABNORMAL ML/MIN/{1.73_M2}
GFR SERPL CREATININE-BSD FRML MDRD: ABNORMAL ML/MIN/{1.73_M2}
GLUCOSE BLD-MCNC: 95 MG/DL (ref 70–99)
HCT VFR BLD CALC: 36.4 % (ref 36.3–47.1)
HEMOGLOBIN: 10.8 G/DL (ref 11.9–15.1)
INR BLD: 1
MCH RBC QN AUTO: 27.7 PG (ref 25.2–33.5)
MCHC RBC AUTO-ENTMCNC: 29.7 G/DL (ref 28.4–34.8)
MCV RBC AUTO: 93.3 FL (ref 82.6–102.9)
NRBC AUTOMATED: 0 PER 100 WBC
PDW BLD-RTO: 16.6 % (ref 11.8–14.4)
PLATELET # BLD: 188 K/UL (ref 138–453)
PMV BLD AUTO: 10.8 FL (ref 8.1–13.5)
POTASSIUM SERPL-SCNC: 4.3 MMOL/L (ref 3.7–5.3)
PROTHROMBIN TIME: 10.6 SEC (ref 9.1–12.3)
RBC # BLD: 3.9 M/UL (ref 3.95–5.11)
SODIUM BLD-SCNC: 135 MMOL/L (ref 135–144)
WBC # BLD: 5.2 K/UL (ref 3.5–11.3)

## 2021-12-19 PROCEDURE — 80048 BASIC METABOLIC PNL TOTAL CA: CPT

## 2021-12-19 PROCEDURE — 6360000002 HC RX W HCPCS: Performed by: STUDENT IN AN ORGANIZED HEALTH CARE EDUCATION/TRAINING PROGRAM

## 2021-12-19 PROCEDURE — 6370000000 HC RX 637 (ALT 250 FOR IP): Performed by: NURSE PRACTITIONER

## 2021-12-19 PROCEDURE — 36415 COLL VENOUS BLD VENIPUNCTURE: CPT

## 2021-12-19 PROCEDURE — 2580000003 HC RX 258: Performed by: INTERNAL MEDICINE

## 2021-12-19 PROCEDURE — 6370000000 HC RX 637 (ALT 250 FOR IP): Performed by: INTERNAL MEDICINE

## 2021-12-19 PROCEDURE — 6360000002 HC RX W HCPCS: Performed by: NURSE PRACTITIONER

## 2021-12-19 PROCEDURE — 85610 PROTHROMBIN TIME: CPT

## 2021-12-19 PROCEDURE — 6360000002 HC RX W HCPCS: Performed by: INTERNAL MEDICINE

## 2021-12-19 PROCEDURE — 99232 SBSQ HOSP IP/OBS MODERATE 35: CPT | Performed by: STUDENT IN AN ORGANIZED HEALTH CARE EDUCATION/TRAINING PROGRAM

## 2021-12-19 PROCEDURE — 85027 COMPLETE CBC AUTOMATED: CPT

## 2021-12-19 RX ORDER — QUETIAPINE FUMARATE 100 MG/1
100 TABLET, FILM COATED ORAL NIGHTLY
Qty: 3 TABLET | Refills: 0 | Status: SHIPPED | OUTPATIENT
Start: 2021-12-19 | End: 2022-09-08

## 2021-12-19 RX ORDER — TRAZODONE HYDROCHLORIDE 100 MG/1
100 TABLET ORAL NIGHTLY
Qty: 3 TABLET | Refills: 0 | Status: ON HOLD | OUTPATIENT
Start: 2021-12-19 | End: 2022-05-10 | Stop reason: HOSPADM

## 2021-12-19 RX ORDER — LEVOFLOXACIN 750 MG/1
750 TABLET ORAL DAILY
Qty: 5 TABLET | Refills: 0 | Status: SHIPPED | OUTPATIENT
Start: 2021-12-19 | End: 2021-12-24

## 2021-12-19 RX ORDER — QUETIAPINE FUMARATE 25 MG/1
25 TABLET, FILM COATED ORAL
Qty: 3 TABLET | Refills: 0 | Status: SHIPPED | OUTPATIENT
Start: 2021-12-19 | End: 2022-09-08

## 2021-12-19 RX ORDER — POLYETHYLENE GLYCOL 3350 17 G/17G
17 POWDER, FOR SOLUTION ORAL DAILY PRN
Qty: 527 G | Refills: 1 | Status: SHIPPED | OUTPATIENT
Start: 2021-12-19

## 2021-12-19 RX ORDER — LORAZEPAM 1 MG/1
1 TABLET ORAL EVERY 8 HOURS PRN
Qty: 4 TABLET | Refills: 0 | Status: SHIPPED | OUTPATIENT
Start: 2021-12-19 | End: 2021-12-21

## 2021-12-19 RX ORDER — GABAPENTIN 300 MG/1
300 CAPSULE ORAL 3 TIMES DAILY
Qty: 9 CAPSULE | Refills: 0 | Status: SHIPPED | OUTPATIENT
Start: 2021-12-19 | End: 2022-10-06 | Stop reason: ALTCHOICE

## 2021-12-19 RX ORDER — GUAIFENESIN 600 MG/1
1200 TABLET, EXTENDED RELEASE ORAL 2 TIMES DAILY
Qty: 28 TABLET | Refills: 0 | Status: SHIPPED | OUTPATIENT
Start: 2021-12-19 | End: 2021-12-26

## 2021-12-19 RX ORDER — MAGNESIUM SULFATE 1 G/100ML
1000 INJECTION INTRAVENOUS ONCE
Status: COMPLETED | OUTPATIENT
Start: 2021-12-19 | End: 2021-12-19

## 2021-12-19 RX ORDER — GUAIFENESIN DEXTROMETHORPHAN HYDROBROMIDE ORAL SOLUTION 10; 100 MG/5ML; MG/5ML
10 SOLUTION ORAL EVERY 4 HOURS PRN
Qty: 1 EACH | Refills: 0 | Status: SHIPPED | OUTPATIENT
Start: 2021-12-19 | End: 2021-12-24

## 2021-12-19 RX ORDER — GREEN TEA/HOODIA GORDONII 315-12.5MG
1 CAPSULE ORAL 2 TIMES DAILY
Qty: 20 TABLET | Refills: 0 | Status: SHIPPED | OUTPATIENT
Start: 2021-12-19 | End: 2021-12-29

## 2021-12-19 RX ADMIN — MORPHINE SULFATE 2 MG: 4 INJECTION INTRAVENOUS at 13:13

## 2021-12-19 RX ADMIN — MORPHINE SULFATE 2 MG: 4 INJECTION INTRAVENOUS at 05:31

## 2021-12-19 RX ADMIN — METOPROLOL SUCCINATE 25 MG: 25 TABLET, FILM COATED, EXTENDED RELEASE ORAL at 10:11

## 2021-12-19 RX ADMIN — FERROUS SULFATE TAB EC 325 MG (65 MG FE EQUIVALENT) 325 MG: 325 (65 FE) TABLET DELAYED RESPONSE at 10:11

## 2021-12-19 RX ADMIN — CEFTRIAXONE SODIUM 1000 MG: 1 INJECTION, POWDER, FOR SOLUTION INTRAMUSCULAR; INTRAVENOUS at 11:48

## 2021-12-19 RX ADMIN — ATORVASTATIN CALCIUM 20 MG: 20 TABLET, FILM COATED ORAL at 10:11

## 2021-12-19 RX ADMIN — Medication 500 MG: at 13:15

## 2021-12-19 RX ADMIN — ESCITALOPRAM OXALATE 10 MG: 10 TABLET ORAL at 10:11

## 2021-12-19 RX ADMIN — PANTOPRAZOLE SODIUM 40 MG: 40 TABLET, DELAYED RELEASE ORAL at 10:11

## 2021-12-19 RX ADMIN — SUCRALFATE 1 G: 1 TABLET ORAL at 17:38

## 2021-12-19 RX ADMIN — MORPHINE SULFATE 2 MG: 4 INJECTION INTRAVENOUS at 01:23

## 2021-12-19 RX ADMIN — ACETAMINOPHEN 650 MG: 325 TABLET ORAL at 10:11

## 2021-12-19 RX ADMIN — GABAPENTIN 300 MG: 300 CAPSULE ORAL at 15:04

## 2021-12-19 RX ADMIN — SUCRALFATE 1 G: 1 TABLET ORAL at 10:11

## 2021-12-19 RX ADMIN — SUCRALFATE 1 G: 1 TABLET ORAL at 15:04

## 2021-12-19 RX ADMIN — METOCLOPRAMIDE 5 MG: 10 TABLET ORAL at 05:31

## 2021-12-19 RX ADMIN — MAGNESIUM SULFATE HEPTAHYDRATE 1000 MG: 1 INJECTION, SOLUTION INTRAVENOUS at 10:13

## 2021-12-19 RX ADMIN — BUSPIRONE HYDROCHLORIDE 10 MG: 10 TABLET ORAL at 10:11

## 2021-12-19 RX ADMIN — CALCIUM 500 MG: 500 TABLET ORAL at 10:13

## 2021-12-19 RX ADMIN — ENOXAPARIN SODIUM 40 MG: 100 INJECTION SUBCUTANEOUS at 10:12

## 2021-12-19 RX ADMIN — QUETIAPINE FUMARATE 25 MG: 25 TABLET ORAL at 06:26

## 2021-12-19 RX ADMIN — GABAPENTIN 300 MG: 300 CAPSULE ORAL at 10:11

## 2021-12-19 RX ADMIN — ONDANSETRON 4 MG: 2 INJECTION INTRAMUSCULAR; INTRAVENOUS at 05:31

## 2021-12-19 ASSESSMENT — PAIN SCALES - GENERAL
PAINLEVEL_OUTOF10: 8
PAINLEVEL_OUTOF10: 6
PAINLEVEL_OUTOF10: 7
PAINLEVEL_OUTOF10: 6
PAINLEVEL_OUTOF10: 7

## 2021-12-19 NOTE — DISCHARGE SUMMARY
[facility]  Office: [address], DO, Rox Holman, DO, [place] Mail, DO, Yamil Banegas Blood, DO, Albino Farris MD, Thelma Langley MD, Damien Hood MD, Vanessa Calvillo MD, Paras Sarmiento MD, Radha Hummel MD, Elena Molina MD, Iván Pederson, DO, Jatin Griggs, DO, Ag Bland MD,  Marielos Rubio DO, Domenica Damon MD, Naya Arteaga MD, Susan Shore MD, Kamron Kelly MD, Leandro Schmitz MD, Elizabeth Aguirre MD, Rissa Callaway MD, Ginny [place] [facility] [place], [facility], [facility], CNP, Ryan Luque, CNP, Stacy Mata, CNS, Judy Mix, CNP, Michael Raw, CNP, Maria Dolores Silverio, CNP, Pb Kuhn, CNP, Judy Faustin, CNP, Kacy Lr PA-C, Luly Preciado, DNP, Leti Brandt, [facility], Franklin Junior, CNP, Patricia Saleem, CNP, Jessica Potter, CNP, Silvio Cooks, CNP, Gladys Branch, CNP, Randall Kirby, [facility]    Discharge Summary     Patient ID: Patrizia Vargas  :  1945   MRN: [id number]     ACCOUNT:  [de-identified]   Patient's PCP: Radha Vaughan MD  Admit Date: 2021   Discharge Date: 2021   Length of Stay: 1  Code Status:  Full Code  Admitting Physician: No admitting provider for patient encounter. Discharge Physician: Ag Bland MD     Active Discharge Diagnoses:     Hospital Problem Lists:  Principal Problem:    [facility])  Active Problems:    GERD (gastroesophageal reflux disease)    Centrilobular emphysema (HCC)    Essential hypertension    Class 1 obesity in adult    Generalized abdominal pain    Multifocal pneumonia  Resolved Problems:    * No resolved hospital problems.  *      Admission Condition: stable     Discharged Condition: {Stable    Hospital Stay:     Hospital Course:  Patrizia Vargas is a 68 y.o. female who was admitted for the management of   [facility]) , presented to ER with Constipation    68year old female with past medical history of GERD, Emphysema, HTN, Obesity, abdominal pain rpesents with constipation and cough. Found to have ileus due to poor Po intake from CAP. fiven magnesium citrate and improved. Rocephin and azithromycin changed to Levaquin. Significant therapeutic interventions:see above    Significant Diagnostic Studies:   Labs / Micro:  CBC:   Lab Results   Component Value Date    WBC 5.2 12/19/2021    RBC 3.90 12/19/2021    HGB 10.8 12/19/2021    HCT 36.4 12/19/2021    MCV 93.3 12/19/2021    MCH 27.7 12/19/2021    MCHC 29.7 12/19/2021    RDW 16.6 12/19/2021     12/19/2021     BMP:    Lab Results   Component Value Date    GLUCOSE 95 12/19/2021     12/19/2021    K 4.3 12/19/2021     12/19/2021    CO2 20 12/19/2021    ANIONGAP 9 12/19/2021    BUN 6 12/19/2021    CREATININE 0.86 12/19/2021    BUNCRER NOT REPORTED 12/19/2021    CALCIUM 8.3 12/19/2021    LABGLOM >60 12/19/2021    GFRAA >60 12/19/2021    GFR      12/19/2021    GFR NOT REPORTED 12/19/2021        Radiology:  CT ABDOMEN PELVIS W IV CONTRAST Additional Contrast? None    Result Date: 12/18/2021  1. Mildly dilated transverse colon without associated obstruction. Findings likely reflect ileus. Mild-to-moderate amount of stool in the colon. No small bowel obstruction. 2. Trace bilateral pleural effusions. Bibasilar atelectasis versus pneumonia. 3. Mild wall thickening of the distal esophagus and GE junction with dilated upstream esophagus. Recommend correlation with endoscopy. 4. Mild hepatic steatosis. RECOMMENDATIONS: Unavailable     XR CHEST PORTABLE    Result Date: 12/17/2021  Progression of multifocal airspace opacities most confluent within both upper lungs worrisome for developing pneumonia Follow-up to assure resolution following medical treatment course is recommended.        Consultations:    Consults:     Final Specialist Recommendations/Findings:   IP CONSULT TO HOSPITALIST      The patient was seen and examined on day of discharge and this discharge summary is in conjunction with any daily progress note from day of discharge. Discharge plan:     Disposition: To a non-[facility] facility    Physician Follow Up:     Xi Sorensen, [address] [facility] [address] [phone]             Requiring Further Evaluation/Follow Up POST HOSPITALIZATION/Incidental Findings: follow up PCP, recommend daily bowel regimen    Diet: Regular diet    Activity: As tolerated    Instructions to Patient: please continue to take your medications. Please ensure you have a daily bowel movement    Discharge Medications:      Medication List      START taking these medications    guaiFENesin 600 MG extended release tablet  Commonly known as: MUCINEX  Take 2 tablets by mouth 2 times daily for 7 days     levoFLOXacin 750 MG tablet  Commonly known as: Levaquin  Take 1 tablet by mouth daily for 5 days     Probiotic Acidophilus Tabs  Take 1 tablet by mouth 2 times daily for 10 days        CHANGE how you take these medications    magnesium hydroxide 400 MG/5ML suspension  Commonly known as: Milk of Magnesia  Take 15 mLs by mouth daily as needed for Constipation  What changed: how much to take     * QUEtiapine 100 MG tablet  Commonly known as: SEROQUEL  What changed: Another medication with the same name was removed. Continue taking this medication, and follow the directions you see here. * QUEtiapine 25 MG tablet  Commonly known as: SEROQUEL  What changed: Another medication with the same name was removed. Continue taking this medication, and follow the directions you see here. * This list has 2 medication(s) that are the same as other medications prescribed for you. Read the directions carefully, and ask your doctor or other care provider to review them with you.             CONTINUE taking these medications    amitriptyline 25 MG tablet  Commonly known as: ELAVIL  Take 1 tablet by mouth nightly     bisacodyl 10 MG suppository  Commonly known as: DULCOLAX     busPIRone 10 MG tablet  Commonly known as: BUSPAR  Take 1 tablet by mouth 2 times daily     calcium carbonate 500 MG chewable tablet  Commonly known as: TUMS     clonazePAM 0.5 MG tablet  Commonly known as: KLONOPIN     dextromethorphan-guaiFENesin  MG/5ML syrup  Commonly known as: ROBITUSSIN-DM  Take 10 mLs by mouth every 4 hours as needed for Cough     ferrous sulfate 325 (65 Fe) MG EC tablet  Commonly known as: FE TABS 325  Take 1 tablet by mouth daily (with breakfast)     fleet 0-52 [id number]     Folic Acid-Cholecalciferol 1-2000 MG-UNIT Tabs     gabapentin 300 MG capsule  Commonly known as: NEURONTIN     Lexapro 10 MG tablet  Generic drug: escitalopram     LORazepam 1 MG tablet  Commonly known as: ATIVAN     Lotrisone 1-0.05 % cream  Generic drug: clotrimazole-betamethasone     metoprolol succinate 25 MG extended release tablet  Commonly known as: TOPROL XL  Take 1 tablet by mouth daily     Oyster Shell 500 MG Tabs     pantoprazole 40 MG tablet  Commonly known as: PROTONIX  Take 1 tablet by mouth 2 times daily     polyethylene glycol 17 g packet  Commonly known as: GLYCOLAX     simvastatin 40 MG tablet  Commonly known as: ZOCOR     sucralfate 1 GM tablet  Commonly known as: CARAFATE  Take 1 tablet by mouth 4 times daily     Symbicort 160-4.5 MCG/ACT Aero  Generic drug: budesonide-formoterol     traZODone 100 MG tablet  Commonly known as: DESYREL  Take 1 tablet by mouth nightly for 7 days        STOP taking these medications    furosemide 20 MG tablet  Commonly known as: LASIX     metoclopramide 5 MG tablet  Commonly known as: REGLAN     oxybutynin 5 MG tablet  Commonly known as: DITROPAN     RA Vitamin D-3 50 MCG (2000 UT) Caps  Generic drug: Cholecalciferol           Where to Get Your Medications      These medications were sent to [facility] [facility], [address] [place] [address]  [address], [place] [address]    Phone: [phone]   · dextromethorphan-guaiFENesin  MG/5ML syrup  · guaiFENesin 600 MG extended release tablet  · levoFLOXacin 750 MG tablet  · Probiotic Acidophilus Tabs     You can get these medications from any pharmacy    You don't need a prescription for these medications  · magnesium hydroxide 400 MG/5ML suspension         No discharge procedures on file. Time Spent on discharge is  32 min in patient examination, evaluation, counseling as well as medication reconciliation, prescriptions for required medications, discharge plan and follow up. Electronically signed by   Juan Diana MD  12/19/2021  8:52 AM      Thank you Dr. Nikki Eddy MD for the opportunity to be involved in this patient's care.

## 2021-12-19 NOTE — DISCHARGE INSTR - COC
Continuity of Care Form    Patient Name: Keesha Gtz   :  1945  MRN:  [id number]    Admit date:  2021  Discharge date:  2021    Code Status Order: Full Code   Advance Directives:      Admitting Physician:  No admitting provider for patient encounter. PCP: Feliz Mast MD    Discharging Nurse:  1945 [address] Unit/Room#: 0331/0331-01  Discharging Unit Phone Number: [phone]    Emergency Contact:   Extended Emergency Contact Information  Primary Emergency Contact: Hamilton Ramirez  Home Phone: [phone]  Relation: Child    Past Surgical History:  Past Surgical History:   Procedure Laterality Date    APPENDECTOMY      CARPAL TUNNEL RELEASE      CHOLECYSTECTOMY, LAPAROSCOPIC N/A 2020    XI LAPAROSCOPIC ROBOTIC CHOLECYSTECTOMY, PYLOROPLASTY performed by Tricia Sood MD at [address] [place] [address]      ERCP  2020    with stent insertion, balloon dilation, sphinctereotomy    ERCP  2020    ** CASE IN OR WITY GI STAFF** ERCP STENT INSERTION performed by Danni Chong MD at [facility]    ERCP  2020    ** CASE IN OR WITH GI STAFF**ERCP SPHINCTER/PAPILLOTOMY performed by Danni Chong MD at [facility]    ERCP  2020    ** CASE IN OR WITH GI STAFF**ERCP DILATION BALLOON performed by Danni Chong MD at [facility]    ERCP N/A 2021    ERCP STENT REMOVAL performed by Alexys Preciado MD at [facility]    ERCP  2021    ERCP STONE REMOVAL performed by Alexys Preciado MD at [address] [facility] [address]    GASTRIC FUNDOPLICATION N/A     XI LAPAROSCOPIC ROBOTIC [address], CHERYL FUNDOPLICATION performed by Tricia Sood MD at [address] [facility] 2020    EGD PEG TUBE PLACEMENT performed by Tricia Sood MD at [address] [facility] N/A 2021    **CASE IN O.R. W/ GI STAFF - EGD WITH REMOVAL PEG TUBE performed by Alexys Preciado MD at [facility]    HAND SURGERY      HC CATH POWER PICC TRIPLE  03/04/2020         HYSTERECTOMY      Abdominal    JOINT REPLACEMENT Right     right hip    OVARY REMOVAL      RHINOPLASTY      TONSILLECTOMY      TOTAL HIP ARTHROPLASTY      TOTAL NEPHRECTOMY      atrophic from infections, age 13    TRACHEOSTOMY N/A 03/27/2020    **ADD ON **WANTS 10:00AM **TRACHEOTOMY performed by Rasheeda Moore MD at [address] N/A 04/02/2020    TRACHEOTOMY EXCHANGE performed by Rasheeda Moore MD at [address] [facility] 03/17/2020    BEDSIDE EGD ESOPHAGOGASTRODUODENOSCOPY (ICU) performed by Rasheeda Moore MD at [address] [place] [address] 05/18/2020    EGD BIOPSY performed by Hernesto Wick MD at [address] [place] [address]  05/18/2020    EGD DILATION BALLOON performed by Hernesto Wick MD at [address] [place] [address] N/A 07/06/2020    ** CASE IN O.R. WITH GI STAFF** EGD ESOPHAGOGASTRODUODENOSCOPY performed by Kisha Box MD at [address] [place] [address] 11/09/2020    EGD DIAGNOSTIC ONLY performed by Ger Andrade MD at [address] [place] [address]  02/08/2021    - EGD WITH REMOVAL PEG TUBE,ERCP STENT REMOVAL,ERCP STONE REMOVAL    UPPER GASTROINTESTINAL ENDOSCOPY N/A 11/24/2021    EGD BIOPSY performed by Ger Andrade MD at [facility]       Immunization History: There is no immunization history on file for this patient. Active Problems:  Patient Active Problem List   Diagnosis Code    Frequency of urination R35.0    Backache M54.9    GERD (gastroesophageal reflux disease) K21.9    MVP (mitral valve prolapse) I34.1    Depression F32. A    Anxiety F41.9    BENEDICT (acute kidney injury) ([facility] N17.9    Dysphagia R13.10    Hiatal hernia K44.9    History of repair of hiatal hernia Z98.890, Z87.19    Centrilobular emphysema ([facility] J43.2 Esophageal dilatation K22.89    Shock ([facility]) R57.9    Fever R50.9    Critical illness polyneuropathy ([facility]) G62.81    Gastric outlet obstruction K31.1    Recurrent UTI N39.0    Tracheostomy in place ([facility] Z93.0    H/O gastric ulcer Z87.11    Hyperlipidemia E78.5    Essential hypertension I10    Tobacco abuse Z72.0    Chronic respiratory failure with hypoxia ([facility]) J96.11    S/P percutaneous endoscopic gastrostomy (PEG) tube placement ([facility]) Z93.1    S/P Nissen fundoplication (without gastrostomy tube) procedure Z98.890    Anemia due to blood loss D50.0    Phlebitis after infusion T80. 1XXA, I80.9    Esophagitis determined by endoscopy K20.90    Expressive aphasia R47.01    Transient speech disturbance R47.9    Speech disturbance R47.9    Coffee ground emesis K92.0    Acute cystitis without hematuria N30.00    Ulcerative esophagitis K22.10    Lower abdominal pain R10.30    Ileus ([facility]) K56.7    Chronic pain G89.29    Dyspnea on exertion R06.00    Orthostatic hypotension I95.1    Stage 3 chronic kidney disease ([facility]) N18.30    Difficulty walking R26.2    Class 1 obesity in adult E66.9    Generalized abdominal pain R10.84    Multifocal pneumonia J18.9    Other constipation K59.09       Isolation/Infection:   Isolation            Contact          Patient Infection Status       Infection Onset Added Last Indicated Last Indicated By Review Planned Expiration Resolved Resolved By    ESBL (Extended Spectrum Beta Lactamase) 12/13/20 12/15/20 09/09/21 Culture, Urine        Klebsiella urine 9/21    MDRO (multi-drug resistant organism) 12/13/20 12/15/20 09/09/21 Culture, Urine        Proteus Urine 2/2021  Klebsiella Urine 12/2020    Resolved    COVID-19 (Rule Out) 12/17/21 12/17/21 12/17/21 COVID-19, Rapid (Ordered)   12/17/21 Rule-Out Test Resulted    COVID-19 (Rule Out) 09/09/21 09/09/21 09/09/21 COVID-19, Rapid (Ordered)   09/09/21 Rule-Out Test Resulted    COVID-19 (Rule Out) 12/13/20 12/13/20 12/13/20 COVID-19 (Ordered) 12/13/20 Rule-Out Test Resulted    COVID-19 (Rule Out) 07/05/20 07/05/20 07/05/20 COVID-19 (Ordered)   07/05/20 Rule-Out Test Resulted    C-diff Rule Out 05/27/20 05/27/20 05/27/20 C DIFF TOXIN/ANTIGEN (Ordered)   05/29/20 Linda Munoz RN    COVID-19 (Rule Out) 05/20/20 05/20/20 05/20/20 COVID-19 (Ordered)   05/21/20 Rule-Out Test Resulted    COVID-19 (Rule Out) 05/14/20 05/14/20 05/14/20 COVID-19 (Ordered)   05/14/20 Rule-Out Test Resulted            Nurse Assessment:  Last Vital Signs: BP (!) 131/56   Pulse 93   Temp 99.1 °F (37.3 °C) (Oral)   Resp 20   Ht 5' 2\" (1.575 m)   Wt 180 lb (81.6 kg)   SpO2 98%   BMI 32.92 kg/m²     Last documented pain score (0-10 scale): Pain Level: 7  Last Weight:   Wt Readings from Last 1 Encounters:   12/17/21 180 lb (81.6 kg)     Mental Status:  oriented    IV Access:  - None    Nursing Mobility/ADLs:  Walking   Assisted  Transfer  Assisted  Bathing  Assisted  Dressing  Assisted  Toileting  Assisted  Feeding  Independent  Med Admin  Independent  Med Delivery   whole    Wound Care Documentation and Therapy:  Wound 07/06/20 Coccyx Stage 1 (Active)   Number of days: 530        Elimination:  Continence: Bowel: No  Bladder: Yes  Urinary Catheter: None   Colostomy/Ileostomy/Ileal Conduit: No       Date of Last BM: 12/19/2021    Intake/Output Summary (Last 24 hours) at 12/19/2021 0905  Last data filed at 12/19/2021 4020  Gross per 24 hour   Intake --   Output 400 ml   Net -400 ml     I/O last 3 completed shifts:  In: -   Out: 400 [Urine:400]    Safety Concerns:      At Risk for Falls    Impairments/Disabilities:      Vision    Nutrition Therapy:  Current Nutrition Therapy:   - Oral Diet:  General    Routes of Feeding: Oral  Liquids: No Restrictions  Daily Fluid Restriction: no  Last Modified Barium Swallow with Video (Video Swallowing Test): not done    Treatments at the Time of Hospital Discharge:   Respiratory Treatments: n/a  Oxygen Therapy:   Not normally on O2, currently at 4L due to pneumonia  Ventilator:    - No ventilator support    Rehab Therapies: Physical Therapy and Occupational Therapy  Weight Bearing Status/Restrictions: No weight bearing restirctions  Other Medical Equipment (for information only, NOT a DME order):  walker  Other Treatments: Skin tear left calf, dry dressing as need    Patient's personal belongings (please select all that are sent with patient):  Personal belongings    RN SIGNATURE:  Electronically signed by Jonathan Jim RN on 12/19/21 at 2:57 PM EST    CASE MANAGEMENT/SOCIAL WORK SECTION    Inpatient Status Date: ***    Readmission Risk Assessment Score:  Readmission Risk              Risk of Unplanned Readmission:  30           Discharging to Facility/ [address] [place] [address]         [address], [place] [address]       Phone: [phone]       Fax: [phone]          Dialysis Facility (if applicable)   Name:  Address:  Dialysis Schedule:  Phone:  Fax:    / signature: Electronically signed by Awad RN on 12/19/21 at 4:12 PM EST    PHYSICIAN SECTION    Prognosis: Guarded    Condition at Discharge: Stable    Rehab Potential (if transferring to Rehab): Guarded    Recommended Labs or Other Treatments After Discharge: Daily bowel regimen, levaquin for PNA. Lactobacillus, Follow up PCP. Physician Certification: I certify the above information and transfer of Yasmin Cunningham  is necessary for the continuing treatment of the diagnosis listed and that she requires [facility] for less 30 days.      Update Admission H&P: No change in H&P    PHYSICIAN SIGNATURE:  Electronically signed by Jc Guevara MD on 12/19/21 at 9:06 AM EST

## 2021-12-19 NOTE — PROGRESS NOTES
[facility]  Office: [address], DO, Satish Alfredo, DO, Meri Tellez, DO, Lionel Kyle Blood, DO, Kaylyn Kasper MD, Leah Daily MD, Shae Wood MD, Gregorio Leary MD, Cooper Hewitt MD, Abbie Arambula MD, Nahomy Gabriel MD, Nicky Mcmillan, DO, Juan Pablo Joel, DO, Cordelia Del Rio MD,  Dustin Martínez DO, Mackenzie Salazar MD, Tiffany Reyna MD, Mylene Navarrete MD, Rebecca Guerrier MD, Kiara Velasquez MD, Brandy Evans MD, Aurelio Carbone MD, Leatha Brock, [facility], CNP, Piotr Mejia, CNP, Jenny Pandey, CNS, Vielka Lewis, [facility], Boone Bass, CNP, Charlie Herrera, [facility], Leti Stack, [facility], Swetha Adhikari, CNP, Rowan Oppenheim, PA-C, Jamal Davidson, [facility], Arthur Daugherty, [facility], Nichelle Turcios, CNP, Janae Lizarraga, CNP, Lindsey Hoskins, CNP, Edison Weber, CNP, Jasmeet Magana, CNP, Bora Ortega, [address] [place] [address]    Progress Note    12/19/2021    8:41 AM    Name:   Holly Anglin  MRN:     [id number]     Acct:      [de-identified]   Room:   [address] [place] [address] Day:  1  Admit Date:  12/17/2021  9:42 PM    PCP:   Albino Van MD  Code Status:  Full Code    Subjective:     C/C:   Chief Complaint   Patient presents with    Constipation     Interval History Status: improved. Paitent seen and examined. Feleing better. multipel bowel movements, weak from ambulating to restroom so often. Still ahs cough. Has improved. Brief History:     68year old female with past medical history of GERD, Emphysema, HTN, Obesity, abdominal pain rpesents with constipation and cough. Found to have ileus due to poor Po intake from CAP. fiven magnesium citrate and improved. Rocephin and azithromycin changed to Levaquin.     Review of Systems:     Constitutional:  negative for chills, fevers, sweats  Respiratory:  negative for cough, dyspnea on exertion, shortness of breath, wheezing  Cardiovascular:  negative for chest pain, chest pressure/discomfort, lower extremity edema, palpitations  Gastrointestinal:  negative for abdominal pain, constipation, diarrhea, nausea, vomiting  Neurological:  negative for dizziness, headache    Medications: Allergies:     Allergies   Allergen Reactions    Prednisone Anxiety    Compazine [Prochlorperazine Maleate]     Penicillin V Potassium     Penicillins Other (See Comments)     Shock- received meropenem and ceftriaxone wo issues 2020       Current Meds:   Scheduled Meds:    busPIRone  10 mg Oral BID    amitriptyline  25 mg Oral Nightly    ferrous sulfate  325 mg Oral Daily with breakfast    [Held by provider] furosemide  20 mg Oral Daily    metoclopramide  5 mg Oral TID AC    metoprolol succinate  25 mg Oral Daily    calcium elemental  500 mg Oral BID    pantoprazole  40 mg Oral BID    atorvastatin  20 mg Oral Daily    sucralfate  1 g Oral 4x Daily    traZODone  100 mg Oral Nightly    sodium chloride flush  5-40 mL IntraVENous 2 times per day    enoxaparin  40 mg SubCUTAneous Daily    polyethylene glycol  17 g Oral Daily    lactulose  20 g Oral TID    budesonide-formoterol  2 puff Inhalation BID    escitalopram  10 mg Oral Daily    gabapentin  300 mg Oral TID    QUEtiapine  100 mg Oral Nightly    QUEtiapine  25 mg Oral QAM AC    azithromycin  500 mg IntraVENous Q24H    cefTRIAXone (ROCEPHIN) IV  1,000 mg IntraVENous Q24H     Continuous Infusions:    sodium chloride      sodium chloride 75 mL/hr at 12/18/21 0617     PRN Meds: oxybutynin, sodium chloride flush, sodium chloride, potassium chloride **OR** potassium alternative oral replacement **OR** potassium chloride, magnesium sulfate, ondansetron **OR** ondansetron, acetaminophen **OR** acetaminophen, bisacodyl, morphine, albuterol, bisacodyl, calcium carbonate, clonazePAM, dextromethorphan-guaiFENesin    Data:     Past Medical History:   has a past medical history of Anxiety, Arthritis, Back pain, Bronchitis, Caffeine use, Carpal tunnel syndrome, Cataracts, bilateral, Constipation, Depression, Diarrhea, GERD (gastroesophageal reflux disease), H/O gastric ulcer, Hyperlipidemia, Hypertension, Mumps, MVP (mitral valve prolapse), Recurrent UTI, Sinusitis, Tracheostomy in place [facility]), Ulcerative esophagitis, and Wellness examination. Social History:   reports that she has quit smoking. Her smoking use included cigarettes. She has a 50.00 pack-year smoking history. She has never used smokeless tobacco. She reports that she does not drink alcohol and does not use drugs. Family History:   Family History   Problem Relation Age of Onset   Perkins Hedger Cancer Mother         uterine    Heart Attack Mother     Heart Attack Father     Other Maternal Grandfather         foot gangrene    Other Paternal Grandmother         bleeding ulcers    Other Paternal Grandfather         lung cancer       Vitals:  BP (!) 131/56   Pulse 93   Temp 99.1 °F (37.3 °C) (Oral)   Resp 20   Ht 5' 2\" (1.575 m)   Wt 180 lb (81.6 kg)   SpO2 98%   BMI 32.92 kg/m²   Temp (24hrs), Av.8 °F (37.1 °C), Min:98.4 °F (36.9 °C), Max:99.1 °F (37.3 °C)    No results for input(s): POCGLU in the last 72 hours. I/O (24Hr):     Intake/Output Summary (Last 24 hours) at 2021 0841  Last data filed at 2021 0629  Gross per 24 hour   Intake --   Output 400 ml   Net -400 ml       Labs:  Hematology:  Recent Labs     21  0602   WBC 9.3 5.2   RBC 4.23 3.90*   HGB 11.6* 10.8*   HCT 37.1 36.4   MCV 87.7 93.3   MCH 27.4 27.7   MCHC 31.3 29.7   RDW 16.2* 16.6*    188   MPV 11.3 10.8   INR  --  1.0     Chemistry:  Recent Labs     21  0710 21  0602     --  135   K 4.2  --  4.3     --  106   CO2 17*  --  20   GLUCOSE 224*  --  95   BUN 12  --  6*   CREATININE 0.92*  --  0.86   ANIONGAP 13  --  9   LABGLOM 59*  --  >60   GFRAA >60  --  >60   CALCIUM 8.4*  --  8.3*   LACTACIDWB 3.1* 1.1  --      Recent Labs     21  2205   PROT 6.4

## 2021-12-19 NOTE — CARE COORDINATION
Case Management Initial Discharge Plan  Keesha Gtz,             Met with:patient to discuss discharge plans. Information verified: address, contacts, phone number, , insurance Yes  Insurance Provider: [facility]    Emergency Contact/Next of Kin name & number: Serina Jon son [phone]  Who are involved in patient's support system? son    PCP: Feliz Mast MD  Date of last visit: unsure      Discharge Planning    Living Arrangements:  Facility       Patient able to perform ADL's:Assisted    Current Services (outpatient & in home) none  DME equipment:  one  DME provider: n/a    Is patient receiving oral anticoagulation therapy? No    If indicated:   Physician managing anticoagulation treatment:   Where does patient obtain lab work for ATC treatment? Potential Assistance Needed:  N/A    Patient agreeable to home care: No  [place] of choice provided:  n/a    Prior SNF/Rehab Placement and Facility: yes  Agreeable to SNF/Rehab: Yes  [place] of choice provided: yes     Evaluation: no    Expected Discharge date:  21    Patient expects to be discharged to: If home: is the family and/or caregiver wiling & able to provide support at home? Lives alone   Who will be providing this support? Currently in a facility*    Follow Up Appointment: Best Day/ Time:      Transportation provider: Family and EMS  Transportation arrangements needed for discharge: Yes    Readmission Risk              Risk of Unplanned Readmission:  30             Does patient have a readmission risk score greater than 14?: Yes  If yes, follow-up appointment must be made within 7 days of discharge. Goals of Care: safety and relieve constipations      Educated patient on transitional options, provided freedom of choice and are agreeable with plan      Discharge Plan: return to [facility] [place].            Electronically signed by Barroso RN on 21 at 10:43 AM EST

## 2021-12-19 NOTE — CARE COORDINATION
Cedrick 45 spoke to Dhiraj Gates, she tells me that the patient is not a bed hold but can return today if discharged. Elaina 71 to inform them of discharge spoke to Alicia he states that they hare expecting her. I explained that at this time I do not have an official time, but just wanted to let them know she will be coming back.    He asked to send Adrianna Fannin [place] to [phone]

## 2022-01-04 ENCOUNTER — TELEPHONE (OUTPATIENT)
Dept: GASTROENTEROLOGY | Age: 77
End: 2022-01-04

## 2022-01-04 ENCOUNTER — OFFICE VISIT (OUTPATIENT)
Dept: GASTROENTEROLOGY | Age: 77
End: 2022-01-04
Payer: MEDICARE

## 2022-01-04 VITALS — DIASTOLIC BLOOD PRESSURE: 64 MMHG | TEMPERATURE: 97.2 F | SYSTOLIC BLOOD PRESSURE: 89 MMHG | HEART RATE: 70 BPM

## 2022-01-04 DIAGNOSIS — Z98.890 S/P NISSEN FUNDOPLICATION (WITHOUT GASTROSTOMY TUBE) PROCEDURE: ICD-10-CM

## 2022-01-04 DIAGNOSIS — K22.89 ESOPHAGEAL DILATATION: ICD-10-CM

## 2022-01-04 DIAGNOSIS — T85.590D: ICD-10-CM

## 2022-01-04 DIAGNOSIS — R74.8 ELEVATED LIVER ENZYMES: ICD-10-CM

## 2022-01-04 DIAGNOSIS — D50.0 ANEMIA, BLOOD LOSS: Primary | ICD-10-CM

## 2022-01-04 PROCEDURE — G8484 FLU IMMUNIZE NO ADMIN: HCPCS | Performed by: INTERNAL MEDICINE

## 2022-01-04 PROCEDURE — 4040F PNEUMOC VAC/ADMIN/RCVD: CPT | Performed by: INTERNAL MEDICINE

## 2022-01-04 PROCEDURE — G8417 CALC BMI ABV UP PARAM F/U: HCPCS | Performed by: INTERNAL MEDICINE

## 2022-01-04 PROCEDURE — 1090F PRES/ABSN URINE INCON ASSESS: CPT | Performed by: INTERNAL MEDICINE

## 2022-01-04 PROCEDURE — 1123F ACP DISCUSS/DSCN MKR DOCD: CPT | Performed by: INTERNAL MEDICINE

## 2022-01-04 PROCEDURE — 99214 OFFICE O/P EST MOD 30 MIN: CPT | Performed by: INTERNAL MEDICINE

## 2022-01-04 PROCEDURE — G8427 DOCREV CUR MEDS BY ELIG CLIN: HCPCS | Performed by: INTERNAL MEDICINE

## 2022-01-04 PROCEDURE — G8400 PT W/DXA NO RESULTS DOC: HCPCS | Performed by: INTERNAL MEDICINE

## 2022-01-04 PROCEDURE — 1036F TOBACCO NON-USER: CPT | Performed by: INTERNAL MEDICINE

## 2022-01-04 PROCEDURE — 1111F DSCHRG MED/CURRENT MED MERGE: CPT | Performed by: INTERNAL MEDICINE

## 2022-01-04 RX ORDER — METOCLOPRAMIDE 5 MG/1
5 TABLET ORAL 4 TIMES DAILY
Qty: 120 TABLET | Refills: 3 | Status: SHIPPED | OUTPATIENT
Start: 2022-01-04

## 2022-01-04 ASSESSMENT — ENCOUNTER SYMPTOMS
VOMITING: 1
RECTAL PAIN: 0
NAUSEA: 1
BLOOD IN STOOL: 0
CONSTIPATION: 1
DIARRHEA: 1
ABDOMINAL DISTENTION: 0
TROUBLE SWALLOWING: 0
COUGH: 0
ABDOMINAL PAIN: 1
WHEEZING: 0
CHOKING: 0
ANAL BLEEDING: 0

## 2022-01-04 NOTE — TELEPHONE ENCOUNTER
called and spoke to Manuela Sanchez at [facility]. Manuela Sanchez is questioning the dosage and directions for the Reglan that was ordered.  informed her that we sent a written prescription with the patient but Manuela Sanchez states she didn't see the Reglan prescrition or the lab orders in the patient's belongings.  informed her I will send a message to [facility] the RN and have her call her back in the morning since she is in clinic for the rest of today. Timr also informed Manuela Sanchez that we will call in the morning to schedule the Colonoscopy. Manuela Sanchez states to call [phone] and ask to speak to [facility] to scheduled colonoscopy.

## 2022-01-04 NOTE — TELEPHONE ENCOUNTER
Received a message from Felipe Kenney at [facility]. Has questions about the labs and procedures ordered. Also has questions about the Reglan.   Would like a call back before she leaves [facility] at 7 pm.    [phone]

## 2022-01-04 NOTE — PROGRESS NOTES
GI CLINIC FOLLOW UP    INTERVAL HISTORY:   No referring provider defined for this encounter. Chief Complaint   Patient presents with    Follow-Up from Hospital     Patient is f/u on hospital visit where she had and EGD. HISTORY OF PRESENT ILLNESS: Wayne Fernandokeeper is a 68 y.o. female , referred for evaluation of*elevated liver enzymes, large hiatal hernia status post robotic repair with fundoplication, PEG and trach in the past , GERD, gastric outlet obstruction, dysphagia/E dil   Patient is here for follow-up  She was admitted in [facility] and get scoped again please refer to those  Today she is having  Growling sounds and nausea in stomach with some dyspepsia she is not comfortable, she is on Carafate and Protonix. on Protonix BID   egd in 11/2021 [facility]   Labs : anemia    on Carafate/protonix/Miralax   labs : anemia , and elevated ALP ( ct negative , hx hip replacement)      Ct scan on 12/18/21 :  Impression   1. Mildly dilated transverse colon without associated obstruction.  Findings   likely reflect ileus.  Mild-to-moderate amount of stool in the colon.  No   small bowel obstruction. 2. Trace bilateral pleural effusions.  Bibasilar atelectasis versus pneumonia. 3. Mild wall thickening of the distal esophagus and GE junction with dilated   upstream esophagus.  Recommend correlation with endoscopy. 4. Mild hepatic steatosis.       RECOMMENDATIONS:   Unavailable                 Findings:   1. The esophagus was tortuous with dilated distal third. 2. The GE junction was noted at 38 cm. 3. The GE junction compliant to the passage of scope without evidence of stricture or stenosis. 4. Mild reflux esophagitis was noted at the GE junction. 5. Sarah-Abrams tear. 6. The mucosa in the fundus, cardia and proximal gastric body appeared severely congested, inflamed with diffuse erythema, and granularity. Biopsies were performed for histology.   7. The distal gastric body antrum and pylorus appeared normal.  8. The examined duodenum appeared normal.  -- Diagnosis --     PROXIMAL STOMACH, BIOPSY:     GASTRIC OXYNTIC TYPE MUCOSA WITH MINIMAL CHRONIC ACTIVE INFLAMMATION     BY H&E STAINING HELICOBACTER PYLORI MICROORGANISMS ARE NOT   IDENTIFIED       Shaka Garcia D.O.   **Electronically Signed Out**         ljf/11/29/2021       Past Medical,Family, and Social History reviewed and does contribute to the patient presentingcondition. Patient's PMH/PSH,SH,PSYCH Hx, MEDs, ALLERGIES, and ROS were all reviewed and updated in the appropriate sections.     PAST MEDICAL HISTORY:  Past Medical History:   Diagnosis Date    Anxiety     Arthritis     Back pain     Bronchitis     Caffeine use     8 coffee / day    Carpal tunnel syndrome     Cataracts, bilateral     Constipation     Depression     Diarrhea     GERD (gastroesophageal reflux disease)     H/O gastric ulcer     Hyperlipidemia     Hypertension     Mumps     MVP (mitral valve prolapse)     Dr. Sharpe Rater in May 2019    Recurrent UTI     Dr. Kendall Acevedo    Sinusitis     Trache[facility] in place [facility])     Ulcerative esophagitis     Wellness examination     Dr. Miquel Adams seen in Jan 2020       Past Surgical History:   Procedure Laterality Date    APPENDECTOMY      CARPAL TUNNEL RELEASE      CHOLECYSTECTOMY, LAPAROSCOPIC N/A 05/22/2020    XI LAPAROSCOPIC ROBOTIC CHOLECYSTECTOMY, PYLOROPLASTY performed by Kaity Pruitt MD at [facility] ER[facility]  05/21/2020    with stent insertion, balloon dilation, sphinctereotomy    ERCP  05/21/2020    ** CASE IN OR WITY GI STAFF** ERCP STENT INSERTION performed by Stepan Hewitt MD at [facility]    ERCP  05/21/2020    ** CASE IN OR WITH GI STAFF**ERCP SPHINCTER/PAPILLOTOMY performed by Stepan Hewitt MD at [facility]    ERCP  05/21/2020    ** CASE IN OR WITH GI STAFF**ERCP DILATION BALLOON performed by Stepan Hewitt MD at [facility]    ERCP N/A 2/8/2021    ERCP STENT REMOVAL performed by Nacnie Mcgraw MD at [facility]    ERCP  2/8/2021    ERCP STONE REMOVAL performed by Nancie Mcgraw MD at [address]      patricia IOL    GASTRIC FUNDOPLICATION N/A 96/46/0283    XI LAPAROSCOPIC ROBOTIC HIATAL HERNIA REPAIR, CHERYL FUNDOPLICATION performed by Deena Graham MD at [address] 03/27/2020    EGD PEG TUBE PLACEMENT performed by Deena Graham MD at [address] N/A 2/8/2021    **CASE IN O.R. W/ GI STAFF - EGD WITH REMOVAL PEG TUBE performed by Nancie Mcgraw MD at [facility]    HAND SURGERY      [facility]. CATH POWER PICC TRIPLE  03/04/2020         HYSTERECTOMY      Abdominal    JOINT REPLACEMENT Right     right hip    OVARY REMOVAL      RHINOPLASTY      TONSILLECTOMY      TOTAL HIP ARTHROPLASTY      TOTAL NEPHRECTOMY      atrophic from infections, age 13   Bethena Lobstein TRACHEOSTOMY N/A 03/27/2020    **ADD ON **WANTS 10:00AM **TRACHEOTOMY performed by Deena Graham MD at [facility] 04/02/2020    TRACHEOTOMY EXCHANGE performed by Deena Graham MD at [address] [place] [address] N/A 03/17/2020    BEDSIDE EGD ESOPHAGOGASTRODUODENOSCOPY (ICU) performed by Deena Graham MD at [address] [place] [address] N/A 05/18/2020    EGD BIOPSY performed by Ayse Guerra MD at [address] [place] [address]  05/18/2020    EGD DILATION BALLOON performed by Ayse Guerra MD at [address] [place] [address] N/A 07/06/2020    ** CASE IN O.R. WITH GI STAFF** EGD ESOPHAGOGASTRODUODENOSCOPY performed by Edwin Garcia MD at [address] [place] [address] 11/09/2020    EGD DIAGNOSTIC ONLY performed by Delmi Mendoza MD at [address] [place] [address]  02/08/2021    - EGD WITH REMOVAL PEG TUBE,ERCP STENT REMOVAL,ERCP STONE REMOVAL    UPPER GASTROINTESTINAL ENDOSCOPY N/A 11/24/2021    EGD BIOPSY performed by Yuridia Valdez MD at [address] [place] [address] St:    Current Outpatient Medications:     polyethylene glycol (GLYCOLAX) 17 g packet, Take 17 g by mouth daily as needed for Constipation, Disp: 527 g, Rfl: 1    QUEtiapine (SEROQUEL) 100 MG tablet, Take 1 tablet by mouth nightly for 3 days, Disp: 3 tablet, Rfl: 0    QUEtiapine (SEROQUEL) 25 MG tablet, Take 1 tablet by mouth every morning (before breakfast) for 3 days, Disp: 3 tablet, Rfl: 0    traZODone (DESYREL) 100 MG tablet, Take 1 tablet by mouth nightly for 3 days, Disp: 3 tablet, Rfl: 0    gabapentin (NEURONTIN) 300 MG capsule, Take 1 capsule by mouth 3 times daily for 3 days. , Disp: 9 capsule, Rfl: 0    escitalopram (LEXAPRO) 10 MG tablet, Take 10 mg by mouth daily, Disp: , Rfl:     Folic Acid-Cholecalciferol 1-2000 MG-UNIT TABS, Take by mouth, Disp: , Rfl:     busPIRone (BUSPAR) 10 MG tablet, Take 1 tablet by mouth 2 times daily, Disp: , Rfl: 0    pantoprazole (PROTONIX) 40 MG tablet, Take 1 tablet by mouth 2 times daily, Disp: 60 tablet, Rfl: 0    traZODone (DESYREL) 100 MG tablet, Take 1 tablet by mouth nightly for 7 days, Disp: 7 tablet, Rfl: 0    bisacodyl (DULCOLAX) 10 MG suppository, Place 10 mg rectally daily as needed for Constipation, Disp: , Rfl:     Sodium Phosphates (FLEET) 7-19 GM/118ML, Place 1 enema rectally As needed after no BM for 5 days, Disp: , Rfl:     clotrimazole-betamethasone (LOTRISONE) 1-0.05 % cream, Apply topically 2 times daily Apply topically 2 times daily. , Disp: , Rfl:     budesonide-formoterol (SYMBICORT) 160-4.5 MCG/ACT AERO, Inhale 2 puffs into the lungs 2 times daily, Disp: , Rfl:     ferrous sulfate (FE TABS 325) 325 (65 Fe) MG EC tablet, Take 1 tablet by mouth daily (with breakfast), Disp: 90 tablet, Rfl: 3    amitriptyline (ELAVIL) 25 MG tablet, Take 1 tablet by mouth nightly, Disp: , Rfl:     metoprolol succinate (TOPROL XL) 25 MG extended release tablet, Take 1 tablet by mouth daily, Disp: 30 tablet, Rfl: 3    sucralfate (CARAFATE) 1 GM tablet, Take 1 tablet by mouth 4 times daily, Disp: 120 tablet, Rfl: 3    Oyster Shell 500 MG TABS, Take 500 mg by mouth 2 times daily , Disp: , Rfl:     calcium carbonate (TUMS) 500 MG chewable tablet, Take 1 tablet by mouth 3 times daily as needed for Heartburn, Disp: , Rfl:     simvastatin (ZOCOR) 40 MG tablet, Take 40 mg by mouth nightly., Disp: , Rfl:     ALLERGIES:   Allergies   Allergen Reactions    Prednisone Anxiety    Compazine [Prochlorperazine Maleate]     Penicillin V Potassium     Penicillins Other (See Comments)     Shock- received meropenem and ceftriaxone wo issues 2020       FAMILY HISTORY:       Problem Relation Age of Onset    Cancer Mother         uterine    Heart Attack Mother     Heart Attack Father     Other Maternal Grandfather         foot gangrene    Other Paternal Grandmother         bleeding ulcers    Other Paternal Grandfather         lung cancer         SOCIAL HISTORY:   Social History     Socioeconomic History    Marital status:       Spouse name: Not on file    Number of children: 2    Years of education: Not on file    Highest education level: Not on file   Occupational History    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   Tobacco Use    Smoking status: Former Smoker     Packs/day: 1.00     Years: 50.00     Pack years: 50.00     Types: Cigarettes    Smokeless tobacco: Never Used    Tobacco comment: quit 1 year ago    Vaping Use    Vaping Use: Former    Substances: Always   Substance and Sexual Activity    Alcohol use: No    Drug use: No    Sexual activity: Never   Other Topics Concern    Not on file   Social History Narrative    Not on file     Social Determinants of Health     Financial Resource Strain:     Difficulty of Paying Living Expenses: Not on file   Food Insecurity:     Worried About [address] in the Last Year: Not on file    Ran Out of Food in the Last Year: Not on file   Transportation Needs:     Lack of Transportation (Medical): Not on file    Lack of Transportation (Non-Medical): Not on file   Physical Activity:     Days of Exercise per Week: Not on file    Minutes of Exercise per Session: Not on file   Stress:     Feeling of Stress : Not on file   Social Connections:     Frequency of Communication with Friends and Family: Not on file    Frequency of Social Gatherings with Friends and Family: Not on file    Attends Islam Services: Not on file    Active Member of [address] [place] [address] [employer] or Organizations: Not on file    Attends Club or Organization Meetings: Not on file    Marital Status: Not on file   Intimate Partner Violence:     Fear of Current or Ex-Partner: Not on file    Emotionally Abused: Not on file    Physically Abused: Not on file    Sexually Abused: Not on file   Housing Stability:     Unable to Pay for Housing in the Last Year: Not on file    Number of [place] in the Last Year: Not on file    Unstable Housing in the Last Year: Not on file       REVIEW OF SYSTEMS: A 12-point review of systemswas obtained and pertinent positives and negatives were enumerated above in the history of present illness. All other reviewed systems / symptoms were negative. Review of Systems   Constitutional: Negative for appetite change, fatigue and unexpected weight change. HENT: Negative for trouble swallowing. Respiratory: Negative for cough, choking and wheezing. Cardiovascular: Negative for chest pain, palpitations and leg swelling. Gastrointestinal: Positive for abdominal pain (indigestion/burning.), constipation, diarrhea, nausea and vomiting. Negative for abdominal distention, anal bleeding, blood in stool and rectal pain. Genitourinary: Negative for difficulty urinating. Allergic/Immunologic: Negative for environmental allergies and food allergies. Neurological: Positive for dizziness. Negative for weakness, light-headedness, numbness and headaches. Hematological: Bruises/bleeds easily. Psychiatric/Behavioral: Negative for sleep disturbance. The patient is not nervous/anxious. LABORATORY DATA: Reviewed  Lab Results   Component Value Date    WBC 5.2 12/19/2021    HGB 10.8 (L) 12/19/2021    HCT 36.4 12/19/2021    MCV 93.3 12/19/2021     12/19/2021     12/19/2021    K 4.3 12/19/2021     12/19/2021    CO2 20 12/19/2021    BUN 6 (L) 12/19/2021    CREATININE 0.86 12/19/2021    LABALBU 3.3 (L) 12/17/2021    BILITOT 0.26 (L) 12/17/2021    ALKPHOS 148 (H) 12/17/2021    AST 17 12/17/2021    ALT 19 12/17/2021    INR 1.0 12/19/2021         Lab Results   Component Value Date    RBC 3.90 (L) 12/19/2021    HGB 10.8 (L) 12/19/2021    MCV 93.3 12/19/2021    MCH 27.7 12/19/2021    MCHC 29.7 12/19/2021    RDW 16.6 (H) 12/19/2021    MPV 10.8 12/19/2021    BASOPCT 0 12/17/2021    LYMPHSABS 1.32 12/17/2021    MONOSABS 0.52 12/17/2021    NEUTROABS 7.00 12/17/2021    EOSABS 0.34 12/17/2021    BASOSABS 0.03 12/17/2021         DIAGNOSTIC TESTING:     CT ABDOMEN PELVIS W IV CONTRAST Additional Contrast? None    Result Date: 12/19/2021  EXAMINATION: CT OF THE ABDOMEN AND PELVIS WITH CONTRAST 12/18/2021 12:04 am TECHNIQUE: CT of the abdomen and pelvis was performed with the administration of intravenous contrast. Multiplanar reformatted images are provided for review. Dose modulation, iterative reconstruction, and/or weight based adjustment of the mA/kV was utilized to reduce the radiation dose to as low as reasonably achievable.  COMPARISON: 11/23/2021 HISTORY: ORDERING SYSTEM PROVIDED HISTORY: concern for bowel obstruction TECHNOLOGIST PROVIDED HISTORY: concern for bowel obstruction Decision Support Exception - unselect if not a suspected or confirmed emergency medical condition->Emergency Medical Condition (MA) Reason for Exam: Concern for bowel obstruction FINDINGS: Lower Chest: Trace bilateral pleural effusions. Consolidative opacities in the lung bases. The distal esophagus is dilated and fluid filled. There is mild wall thickening in the distal esophagus extending into the GE junction. Small hiatal hernia. Organs: The liver is decreased in attenuation. The liver, spleen, pancreas and adrenal glands are without focal abnormality. Status post cholecystectomy. Stable very mild biliary duct dilation. The right kidney is not visualized. No left renal calculus or hydronephrosis. There is a subcentimeter left renal cyst. GI/Bowel: Mildly dilated transverse colon measuring up to 7 cm. No other dilated loops of bowel or bowel wall thickening. Moderate amount of stool in the colon. The appendix is not visualized. No free air. Pelvis: No bladder wall thickening. No pathologically enlarged adenopathy or free fluid. Status post hysterectomy. Peritoneum/Retroperitoneum: The aorta is normal in caliber with mild atherosclerosis. The celiac axis, SMA and CHARLEY are patent. The portal venous system is patent. No pathologically enlarged adenopathy. No ascites or drainable fluid collection. Bones/Soft Tissues: Postsurgical changes from right hip arthroplasty. No acute findings in the bones or soft tissues. 1. Mildly dilated transverse colon without associated obstruction. Findings likely reflect ileus. Mild-to-moderate amount of stool in the colon. No small bowel obstruction. 2. Trace bilateral pleural effusions. Bibasilar atelectasis versus pneumonia. 3. Mild wall thickening of the distal esophagus and GE junction with dilated upstream esophagus. Recommend correlation with endoscopy. 4. Mild hepatic steatosis.  RECOMMENDATIONS: Unavailable     XR CHEST PORTABLE    Result Date: 12/17/2021  EXAMINATION: ONE XRAY VIEW OF THE CHEST 12/17/2021 10:56 pm COMPARISON: Chest x-ray 11/23/2021 and chest CT performed 11/24/2021 HISTORY: ORDERING SYSTEM PROVIDED HISTORY: cough, body aches, shortness Coloration: Skin is not pale. Findings: No erythema or rash. Comments: She is not diaphoretic   Neurological:      Mental Status: She is alert and oriented to person, place, and time. Deep Tendon Reflexes: Reflexes are normal and symmetric. Psychiatric:         Behavior: Behavior normal.         Thought Content: Thought content normal.         Judgment: Judgment normal.           IMPRESSION: Ms. Louise Phillip is a 68 y.o. female with      Diagnosis Orders   1. Anemia, blood loss  COLONOSCOPY W/ OR W/O BIOPSY    Vitamin B12    Folate   2. S/P Nissen fundoplication (without gastrostomy tube) procedure     3. Esophageal dilatation     4. Elevated liver enzymes     5. Biliary stent obstruction, subsequent encounter       Will add Reglan cautiously I told her she can take it instead of the Carafate and just continue with the PPI  I will take it from there  She is anemic with a hemoglobin running around 10-11  Her iron is been normal we will check the rest of her anemia work-up I will proceed with a colonoscopy anyway    Diet/life style/natural hx /complication of the dx were all explained in details   Past medical, past surgical, social history, psychiatric history, medications or allergies, all reviewed and  updated    Thank you for allowing me to participate in the care of Ms. Louise Phillip. For any further questions please do not hesitate to contact me. I have reviewed and agree with the ROS entered by the MA/RN. Note is dictated utilizing voice recognition software. Unfortunately this leads to occasional typographical errors. Please contact our office if you have any questions.       Tj Shah MD  [facility]  O: #[phone]

## 2022-01-05 RX ORDER — POLYETHYLENE GLYCOL 3350 17 G/17G
POWDER, FOR SOLUTION ORAL
Qty: 238 G | Refills: 0 | Status: ON HOLD | OUTPATIENT
Start: 2022-01-05 | End: 2022-05-10 | Stop reason: HOSPADM

## 2022-01-05 NOTE — TELEPHONE ENCOUNTER
Writer spoke to Raúl from [facility] in regards to scheduling colon proc ordered at [address] on 1/4/22. Pt is sched w/ Corrina at [facility] AT [facility] 1/28/22 @ 11:45am proc time, 10:15am arrival time. Dulcolax & Golytely orders will be faxed to [facility] at [phone]. Bowel prep instructions given to Raúl over the phone and hard copy will be faxed also. Raúl instructed pt will need a  and to bring proof of Covid-19 vaccinations on day of proc. She voices her understanding.

## 2022-01-05 NOTE — TELEPHONE ENCOUNTER
Writer called and spoke with Brigid Jack. She states she is all taken care of as far as orders. They are just waiting for the call regarding scheduling colonoscopy.

## 2022-01-10 NOTE — TELEPHONE ENCOUNTER
Raúl from [facility] called and lvm requesting call back at [phone] ext [phone]. She has questions on the bowel prep instructions.

## 2022-01-12 NOTE — TELEPHONE ENCOUNTER
Writer left  on Danny's vm asking her to call back to the office so we can discuss her questions regarding bowel prep.

## 2022-01-12 NOTE — TELEPHONE ENCOUNTER
Bridgette Hull from [facility] called the office with bowel prep questions. It appears Miralax was ordered in error and pt needs Golytely. Bridgette Hull confirmed she has the correct written instructions (golytely/Dulcolax) but did not have the Golytely script. She only had Miralax and dulcolax script. Writer informed her that the patient does not need the Miralax per instructions. Writer sent a order to clinical for Golytely. Writer will fax it over to facility once it has been approved.

## 2022-01-24 ENCOUNTER — TELEPHONE (OUTPATIENT)
Dept: GASTROENTEROLOGY | Age: 77
End: 2022-01-24

## 2022-01-24 NOTE — TELEPHONE ENCOUNTER
Cem Winters from [facility] stating they rec'd instructions for Golytely but have miralax prep at facility. She would like clarification on this.  [phone] ext [phone]

## 2022-01-25 RX ORDER — POLYETHYLENE GLYCOL 3350, SODIUM SULFATE ANHYDROUS, SODIUM BICARBONATE, SODIUM CHLORIDE, POTASSIUM CHLORIDE 236; 22.74; 6.74; 5.86; 2.97 G/4L; G/4L; G/4L; G/4L; G/4L
4 POWDER, FOR SOLUTION ORAL ONCE
Qty: 4000 ML | Refills: 0 | Status: SHIPPED | OUTPATIENT
Start: 2022-01-25 | End: 2022-01-25

## 2022-01-25 NOTE — TELEPHONE ENCOUNTER
Alda Iniguez called again, states she still hasn't rec'd the prep or prep instructions.  Writer printed off the prep instructions and the script and faxed it to [phone]

## 2022-01-25 NOTE — TELEPHONE ENCOUNTER
Writer returned phone call & left msg on  for Raúl. Left msg asking if she rec'd the order for Golytely that was sent after she spoke to the office in an earlier conversation regarding this same issue. Writer asked Raúl to call back & let us know if she rec'd the order and if not, we will resend.

## 2022-01-25 NOTE — TELEPHONE ENCOUNTER
Called the nursing facility to confirm that we sent this information to them and we rec'd the OK that it went through. Writer was on hold for several minutes and then was hung up on. Writer called back, and was told the reason for the disconnected call was because the nurse is on lunch but we are welcome to call back in an hour and a half.

## 2022-01-25 NOTE — TELEPHONE ENCOUNTER
Arminda Zambrano from [facility] called, state they need the dose for the [facility] bowel prep so they can order it at their facility. Please call [phone].

## 2022-01-25 NOTE — TELEPHONE ENCOUNTER
[facility]   Please give order to  so we can fax order to the nursing rehab

## 2022-01-26 NOTE — TELEPHONE ENCOUNTER
[employer] from [facility] called, states they still have not rec'd the instructions or the script.

## 2022-01-26 NOTE — TELEPHONE ENCOUNTER
Called and spoke with Lizet So, the wrong fax number was given script and instructions were faxed to [phone].  Confirmed this fax with Lizet So x2

## 2022-01-28 ENCOUNTER — HOSPITAL ENCOUNTER (OUTPATIENT)
Age: 77
Setting detail: OUTPATIENT SURGERY
Discharge: OTHER INSTITUTION | End: 2022-01-28
Attending: INTERNAL MEDICINE | Admitting: INTERNAL MEDICINE
Payer: MEDICARE

## 2022-01-28 ENCOUNTER — ANESTHESIA EVENT (OUTPATIENT)
Dept: OPERATING ROOM | Age: 77
End: 2022-01-28
Payer: MEDICARE

## 2022-01-28 ENCOUNTER — ANESTHESIA (OUTPATIENT)
Dept: OPERATING ROOM | Age: 77
End: 2022-01-28
Payer: MEDICARE

## 2022-01-28 VITALS
RESPIRATION RATE: 14 BRPM | TEMPERATURE: 97.7 F | HEART RATE: 73 BPM | WEIGHT: 179 LBS | OXYGEN SATURATION: 96 % | DIASTOLIC BLOOD PRESSURE: 66 MMHG | SYSTOLIC BLOOD PRESSURE: 122 MMHG | BODY MASS INDEX: 32.94 KG/M2 | HEIGHT: 62 IN

## 2022-01-28 VITALS
OXYGEN SATURATION: 99 % | RESPIRATION RATE: 17 BRPM | SYSTOLIC BLOOD PRESSURE: 108 MMHG | DIASTOLIC BLOOD PRESSURE: 54 MMHG

## 2022-01-28 PROCEDURE — 6360000002 HC RX W HCPCS: Performed by: NURSE ANESTHETIST, CERTIFIED REGISTERED

## 2022-01-28 PROCEDURE — 45385 COLONOSCOPY W/LESION REMOVAL: CPT | Performed by: INTERNAL MEDICINE

## 2022-01-28 PROCEDURE — 2580000003 HC RX 258: Performed by: ANESTHESIOLOGY

## 2022-01-28 PROCEDURE — 88305 TISSUE EXAM BY PATHOLOGIST: CPT

## 2022-01-28 PROCEDURE — 2500000003 HC RX 250 WO HCPCS: Performed by: NURSE ANESTHETIST, CERTIFIED REGISTERED

## 2022-01-28 PROCEDURE — 7100000011 HC PHASE II RECOVERY - ADDTL 15 MIN: Performed by: INTERNAL MEDICINE

## 2022-01-28 PROCEDURE — 7100000010 HC PHASE II RECOVERY - FIRST 15 MIN: Performed by: INTERNAL MEDICINE

## 2022-01-28 PROCEDURE — 3609010400 HC COLONOSCOPY POLYPECTOMY HOT BIOPSY: Performed by: INTERNAL MEDICINE

## 2022-01-28 PROCEDURE — 3700000000 HC ANESTHESIA ATTENDED CARE: Performed by: INTERNAL MEDICINE

## 2022-01-28 PROCEDURE — 2709999900 HC NON-CHARGEABLE SUPPLY: Performed by: INTERNAL MEDICINE

## 2022-01-28 PROCEDURE — 3700000001 HC ADD 15 MINUTES (ANESTHESIA): Performed by: INTERNAL MEDICINE

## 2022-01-28 RX ORDER — SODIUM CHLORIDE 0.9 % (FLUSH) 0.9 %
10 SYRINGE (ML) INJECTION PRN
Status: DISCONTINUED | OUTPATIENT
Start: 2022-01-28 | End: 2022-01-28 | Stop reason: HOSPADM

## 2022-01-28 RX ORDER — SODIUM CHLORIDE 9 MG/ML
25 INJECTION, SOLUTION INTRAVENOUS PRN
Status: DISCONTINUED | OUTPATIENT
Start: 2022-01-28 | End: 2022-01-28 | Stop reason: HOSPADM

## 2022-01-28 RX ORDER — MULTIVIT-MIN/IRON/FOLIC ACID/K 18-600-40
1 CAPSULE ORAL
Status: ON HOLD | COMMUNITY
End: 2022-05-07

## 2022-01-28 RX ORDER — SODIUM CHLORIDE, SODIUM LACTATE, POTASSIUM CHLORIDE, CALCIUM CHLORIDE 600; 310; 30; 20 MG/100ML; MG/100ML; MG/100ML; MG/100ML
INJECTION, SOLUTION INTRAVENOUS CONTINUOUS
Status: DISCONTINUED | OUTPATIENT
Start: 2022-01-28 | End: 2022-01-28 | Stop reason: HOSPADM

## 2022-01-28 RX ORDER — CLONAZEPAM 0.5 MG/1
0.5 TABLET ORAL 3 TIMES DAILY PRN
COMMUNITY

## 2022-01-28 RX ORDER — LIDOCAINE HYDROCHLORIDE 20 MG/ML
INJECTION, SOLUTION EPIDURAL; INFILTRATION; INTRACAUDAL; PERINEURAL PRN
Status: DISCONTINUED | OUTPATIENT
Start: 2022-01-28 | End: 2022-01-28 | Stop reason: SDUPTHER

## 2022-01-28 RX ORDER — LORAZEPAM 1 MG/1
1 TABLET ORAL EVERY 8 HOURS PRN
Status: ON HOLD | COMMUNITY
End: 2022-05-10 | Stop reason: HOSPADM

## 2022-01-28 RX ORDER — LORATADINE 10 MG/1
10 CAPSULE, LIQUID FILLED ORAL DAILY
COMMUNITY

## 2022-01-28 RX ORDER — SODIUM CHLORIDE 9 MG/ML
INJECTION, SOLUTION INTRAVENOUS CONTINUOUS
Status: DISCONTINUED | OUTPATIENT
Start: 2022-01-28 | End: 2022-01-28 | Stop reason: HOSPADM

## 2022-01-28 RX ORDER — PROPOFOL 10 MG/ML
INJECTION, EMULSION INTRAVENOUS PRN
Status: DISCONTINUED | OUTPATIENT
Start: 2022-01-28 | End: 2022-01-28 | Stop reason: SDUPTHER

## 2022-01-28 RX ORDER — ONDANSETRON 2 MG/ML
4 INJECTION INTRAMUSCULAR; INTRAVENOUS
Status: DISCONTINUED | OUTPATIENT
Start: 2022-01-28 | End: 2022-01-28 | Stop reason: HOSPADM

## 2022-01-28 RX ORDER — LIDOCAINE HYDROCHLORIDE 10 MG/ML
1 INJECTION, SOLUTION EPIDURAL; INFILTRATION; INTRACAUDAL; PERINEURAL
Status: DISCONTINUED | OUTPATIENT
Start: 2022-01-28 | End: 2022-01-28 | Stop reason: HOSPADM

## 2022-01-28 RX ORDER — SODIUM CHLORIDE 0.9 % (FLUSH) 0.9 %
10 SYRINGE (ML) INJECTION EVERY 12 HOURS SCHEDULED
Status: DISCONTINUED | OUTPATIENT
Start: 2022-01-28 | End: 2022-01-28 | Stop reason: HOSPADM

## 2022-01-28 RX ADMIN — PROPOFOL 50 MG: 10 INJECTION, EMULSION INTRAVENOUS at 11:16

## 2022-01-28 RX ADMIN — SODIUM CHLORIDE, POTASSIUM CHLORIDE, SODIUM LACTATE AND CALCIUM CHLORIDE: 600; 310; 30; 20 INJECTION, SOLUTION INTRAVENOUS at 10:33

## 2022-01-28 RX ADMIN — LIDOCAINE HYDROCHLORIDE 50 MG: 20 INJECTION, SOLUTION EPIDURAL; INFILTRATION; INTRACAUDAL; PERINEURAL at 11:15

## 2022-01-28 RX ADMIN — PROPOFOL 150 MCG/KG/MIN: 10 INJECTION, EMULSION INTRAVENOUS at 11:20

## 2022-01-28 ASSESSMENT — PULMONARY FUNCTION TESTS
PIF_VALUE: 1

## 2022-01-28 ASSESSMENT — ENCOUNTER SYMPTOMS: SHORTNESS OF BREATH: 1

## 2022-01-28 ASSESSMENT — PAIN SCALES - GENERAL: PAINLEVEL_OUTOF10: 0

## 2022-01-28 ASSESSMENT — PAIN - FUNCTIONAL ASSESSMENT: PAIN_FUNCTIONAL_ASSESSMENT: 0-10

## 2022-01-28 NOTE — ANESTHESIA POSTPROCEDURE EVALUATION
Department of Anesthesiology  Postprocedure Note    Patient: Hamilton Lux  MRN: [id number]  YOB: 1945  Date of evaluation: 1/28/2022  Time:  1:02 PM     Procedure Summary     Date: 01/28/22 Room / Location: [place] [address] / [facility] - INPATIENT    Anesthesia Start: 1113 Anesthesia Stop: 7888    Procedure: COLONOSCOPY POLYPECTOMY HOT (N/A ) Diagnosis: (DX ANEMIA)    Surgeons: Armando Dacosta MD Responsible Provider: Sis Barahona MD    Anesthesia Type: MAC ASA Status: 3          Anesthesia Type: MAC    Alhaji Phase I: Alhaji Score: 10    Alhaji Phase II: Alhaji Score: 8    Last vitals: Reviewed and per EMR flowsheets.        Anesthesia Post Evaluation    Complications: no

## 2022-01-28 NOTE — H&P
History and Physical Update    Pt Name: Brian Townsend  MRN: [id number]  YOB: 1945  Date of evaluation: 1/28/2022      [x] I have reviewed the progress note found in Epic by Dr. Dayna Guillen dated 1/4/22 used for an Interval History and Physical note. [x] I have examined Brian Twonsend a 68 y.o., female who is scheduled for a diagnostic colonoscopy by Dr. Dayna Guillen due to anemia. The patient denies health changes since her appointment with Dr. Dayna Guillen on 1/4/22. Pt denies fever, chills, productive cough, SOB, chest pain, open sores, rashes, wounds, and history of diabetes. Vitamin D, folic acid cholecalciferol, and oyster shell were taken today. Vital signs: BP (!) 126/48   Pulse 72   Temp 97.3 °F (36.3 °C) (Temporal)   Resp 18   Ht 5' 2\" (1.575 m)   Wt 179 lb (81.2 kg)   SpO2 99%   BMI 32.74 kg/m²      Allergies:  Prednisone, Compazine [prochlorperazine maleate], Penicillin v potassium, and Penicillins    Past medical history, surgical history, social history, and family history were reviewed and updated in EPIC as indicated. Medications:    Prior to Admission medications    Medication Sig Start Date End Date Taking? Authorizing Provider   bisacodyl (DULCOLAX) 5 MG EC tablet TAKE 2 TABS AS INSTRUCTED BY THE OFFICE THE DAY PRIOR TO COLONOSCOPY 1/5/22   Antoine Houser MD   polyethylene glycol (GLYCOLAX) 17 GM/SCOOP powder Use as directed by following your patient instructions given by office.  1/5/22   Antoine Houser MD   metoclopramide (REGLAN) 5 MG tablet Take 1 tablet by mouth 4 times daily 1/4/22   Antoine Houser MD   polyethylene glycol (GLYCOLAX) 17 g packet Take 17 g by mouth daily as needed for Constipation 12/19/21   Leona Eddy MD   QUEtiapine (SEROQUEL) 100 MG tablet Take 1 tablet by mouth nightly for 3 days 12/19/21 12/22/21  Leona Eddy MD   QUEtiapine (SEROQUEL) 25 MG tablet Take 1 tablet by mouth every morning (before breakfast) for 3 days 12/19/21 12/22/21  Adry Sofi Henley MD   traZODone (DESYREL) 100 MG tablet Take 1 tablet by mouth nightly for 3 days 12/19/21 12/22/21  Cheryle Bumpers, MD   gabapentin (NEURONTIN) 300 MG capsule Take 1 capsule by mouth 3 times daily for 3 days. 12/19/21 12/22/21  Cheryle Bumpers, MD   escitalopram (LEXAPRO) 10 MG tablet Take 10 mg by mouth daily    Historical Provider, MD   Folic Acid-Cholecalciferol 1-2000 MG-UNIT TABS Take by mouth    Historical Provider, MD   busPIRone (BUSPAR) 10 MG tablet Take 1 tablet by mouth 2 times daily 11/30/21   Jc Aleman MD   pantoprazole (PROTONIX) 40 MG tablet Take 1 tablet by mouth 2 times daily 11/25/21 12/25/21  Cheryle Bumpers, MD   traZODone (DESYREL) 100 MG tablet Take 1 tablet by mouth nightly for 7 days 11/25/21 12/18/21  Cheryle Bumpers, MD   bisacodyl (DULCOLAX) 10 MG suppository Place 10 mg rectally daily as needed for Constipation    Historical Provider, MD   Sodium Phosphates (FLEET) 7-19 GM/118ML Place 1 enema rectally As needed after no BM for 5 days    Historical Provider, MD   clotrimazole-betamethasone (LOTRISONE) 1-0.05 % cream Apply topically 2 times daily Apply topically 2 times daily.     Historical Provider, MD   budesonide-formoterol (SYMBICORT) 160-4.5 MCG/ACT AERO Inhale 2 puffs into the lungs 2 times daily    Historical Provider, MD   ferrous sulfate (FE TABS 325) 325 (65 Fe) MG EC tablet Take 1 tablet by mouth daily (with breakfast) 12/16/20   MARGOTH Greene - NP   amitriptyline (ELAVIL) 25 MG tablet Take 1 tablet by mouth nightly 7/14/20   Luis M Juares MD   metoprolol succinate (TOPROL XL) 25 MG extended release tablet Take 1 tablet by mouth daily 5/30/20   Valeria Pope MD   sucralfate (CARAFATE) 1 GM tablet Take 1 tablet by mouth 4 times daily 4/6/20   Gabi Smith MD   Oyster Shell 500 MG TABS Take 500 mg by mouth 2 times daily     Historical Provider, MD   calcium carbonate (TUMS) 500 MG chewable tablet Take 1 tablet by mouth 3 times daily as needed for Heartburn    Historical Provider, MD   simvastatin (ZOCOR) 40 MG tablet Take 40 mg by mouth nightly. Historical Provider, MD       This is a 68 y.o. female who is pleasant, cooperative, alert and oriented x 3, in no acute distress. Obese. Heart: Regular rate and rhythm without murmur, gallop, or rub. Lungs: Normal respiratory effort, unlabored, and clear to auscultation without wheezes or rales bilaterally. Abdomen: Upper abdominal tenderness. Soft, non-distended, and active bowel sounds. Pedal pulses: are palpable bilaterally. Labs:  No results for input(s): HGB, HCT, WBC, MCV, PLT, NA, K, CL, CO2, BUN, CREATININE, GLUCOSE, INR, PROTIME, APTT, AST, ALT, LABALBU, HCG in the last 720 hours. No results for input(s): COVID19 in the last 720 hours.       MARGOTH Burton CNP   Electronically signed 1/28/2022 at 10:29 Ed Reynolds MD   Physician   Specialty:  Gastroenterology   Progress Notes       Signed   Encounter Date:  1/4/2022         Related encounter: Office Visit from 1/4/2022 in [address] All Collapse All[]Expand All by Default        Show:Clear all  [x]Manual[x]Template[x]Copied    Added by:  Jenelle Bender MD      []Saúl for details            GI CLINIC FOLLOW UP     INTERVAL HISTORY:   No referring provider defined for this encounter.          Chief Complaint   Patient presents with    Follow-Up from [address] Patient is f/u on hospital visit where she had and EGD.               HISTORY OF PRESENT ILLNESS: Christine Fuentes is a 68 y.o. female , referred for evaluation of*elevated liver enzymes, large hiatal hernia status post robotic repair with fundoplication, PEG and trach in the past , GERD, gastric outlet obstruction, dysphagia/E dil   Patient is here for follow-up  She was admitted in [facility]. Brian's and get scoped again please refer to those  Today she is having  [place] Medical History:   Diagnosis Date    Anxiety      Arthritis      Back pain      Bronchitis      Caffeine use       8 coffee / day    Carpal tunnel syndrome      Cataracts, bilateral      Constipation      Depression      Diarrhea      GERD (gastroesophageal reflux disease)      H/O gastric ulcer      Hyperlipidemia      Hypertension      Mumps      MVP (mitral valve prolapse)       Dr. Bourgeois Grandchild in May 2019    Recurrent UTI       Dr. Mindy Anthony    Sinusitis      Tracheostomy in place [facility])      Ulcerative esophagitis      Wellness examination       Dr. Mansi Gilman seen in Jan 2020            Past Surgical History         Past Surgical History:   Procedure Laterality Date    APPENDECTOMY        CARPAL TUNNEL RELEASE        CHOLECYSTECTOMY, LAPAROSCOPIC N/A 05/22/2020     XI LAPAROSCOPIC ROBOTIC CHOLECYSTECTOMY, PYLOROPLASTY performed by Tri Brown MD at [address] [place] [address] ERCP   05/21/2020     with stent insertion, balloon dilation, sphinctereotomy    ERCP   05/21/2020     ** CASE IN OR WITY GI STAFF** ERCP STENT INSERTION performed by Daily Fajardo MD at [facility]    ERCP   05/21/2020     ** CASE IN OR WITH GI STAFF**ERCP SPHINCTER/PAPILLOTOMY performed by Daily Fajardo MD at [facility]    ERCP   05/21/2020     ** CASE IN OR WITH GI STAFF**ERCP DILATION BALLOON performed by Daily Fajardo MD at [facility]    ERCP N/A 2/8/2021     ERCP STENT REMOVAL performed by Katherene Primrose, MD at [facility]    ERCP   2/8/2021     ERCP STONE REMOVAL performed by Katherene Primrose, MD at [facility]   [address]         patricia IOL    GASTRIC FUNDOPLICATION N/A 35/23/4330     XI LAPAROSCOPIC ROBOTIC HIATAL HERNIA REPAIR, CHERYL FUNDOPLICATION performed by Tri Brown MD at [facility] N/A 03/27/2020     EGD PEG TUBE PLACEMENT performed by Tri Brown MD at [facility] N/A 2/8/2021     **CASE IN O.R. W/ GI STAFF - EGD WITH REMOVAL PEG TUBE performed by Scotty Anderson MD at [facility]    [facility]        [facility]. CATH POWER PICC TRIPLE   03/04/2020          HYSTERECTOMY         Abdominal    JOINT REPLACEMENT Right       right hip    OVARY REMOVAL        RHINOPLASTY        TONSILLECTOMY        TOTAL HIP ARTHROPLASTY        TOTAL NEPHRECTOMY         atrophic from infections, age 14    TRACHEOSTOMY N/A 03/27/2020     **ADD ON **WANTS 10:00AM **TRACHEOTOMY performed by Fozia Damon MD at [address] 04/02/2020     TRACHEOTOMY EXCHANGE performed by Fozia Damon MD at [facility] 03/17/2020     BEDSIDE EGD ESOPHAGOGASTRODUODENOSCOPY (ICU) performed by Fozia Damon MD at [address] [place] [address] 05/18/2020     EGD BIOPSY performed by Camille Palma MD at [address] [place] [address]   05/18/2020     EGD DILATION BALLOON performed by Camille Palma MD at [address] [place] [address] N/A 07/06/2020     ** CASE IN O.R. WITH GI STAFF** EGD ESOPHAGOGASTRODUODENOSCOPY performed by Joey Bloom MD at [address] [place] [address] 11/09/2020     EGD DIAGNOSTIC ONLY performed by Beverley Michelle MD at [address] [place] [address]   02/08/2021     - EGD WITH REMOVAL PEG TUBE,ERCP STENT REMOVAL,ERCP STONE REMOVAL    UPPER GASTROINTESTINAL ENDOSCOPY N/A 11/24/2021     EGD BIOPSY performed by Beverley Michelle MD at [facility]:    Current Medication      Current Outpatient Medications:     polyethylene glycol (GLYCOLAX) 17 g packet, Take 17 g by mouth daily as needed for Constipation, Disp: 527 g, Rfl: 1    QUEtiapine (SEROQUEL) 100 MG tablet, Take 1 tablet by mouth nightly for 3 days, Disp: 3 tablet, Rfl: 0    QUEtiapine (SEROQUEL) 25 MG tablet, Take 1 tablet by mouth every morning (before breakfast) for 3 days, Disp: 3 tablet, Rfl: 0    traZODone (DESYREL) 100 MG tablet, Take 1 tablet by mouth nightly for 3 days, Disp: 3 tablet, Rfl: 0    gabapentin (NEURONTIN) 300 MG capsule, Take 1 capsule by mouth 3 times daily for 3 days. , Disp: 9 capsule, Rfl: 0    escitalopram (LEXAPRO) 10 MG tablet, Take 10 mg by mouth daily, Disp: , Rfl:     Folic Acid-Cholecalciferol 1-2000 MG-UNIT TABS, Take by mouth, Disp: , Rfl:     busPIRone (BUSPAR) 10 MG tablet, Take 1 tablet by mouth 2 times daily, Disp: , Rfl: 0    pantoprazole (PROTONIX) 40 MG tablet, Take 1 tablet by mouth 2 times daily, Disp: 60 tablet, Rfl: 0    traZODone (DESYREL) 100 MG tablet, Take 1 tablet by mouth nightly for 7 days, Disp: 7 tablet, Rfl: 0    bisacodyl (DULCOLAX) 10 MG suppository, Place 10 mg rectally daily as needed for Constipation, Disp: , Rfl:     Sodium Phosphates (FLEET) 7-19 GM/118ML, Place 1 enema rectally As needed after no BM for 5 days, Disp: , Rfl:     clotrimazole-betamethasone (LOTRISONE) 1-0.05 % cream, Apply topically 2 times daily Apply topically 2 times daily. , Disp: , Rfl:     budesonide-formoterol (SYMBICORT) 160-4.5 MCG/ACT AERO, Inhale 2 puffs into the lungs 2 times daily, Disp: , Rfl:     ferrous sulfate (FE TABS 325) 325 (65 Fe) MG EC tablet, Take 1 tablet by mouth daily (with breakfast), Disp: 90 tablet, Rfl: 3    amitriptyline (ELAVIL) 25 MG tablet, Take 1 tablet by mouth nightly, Disp: , Rfl:     metoprolol succinate (TOPROL XL) 25 MG extended release tablet, Take 1 tablet by mouth daily, Disp: 30 tablet, Rfl: 3    sucralfate (CARAFATE) 1 GM tablet, Take 1 tablet by mouth 4 times daily, Disp: 120 tablet, Rfl: 3    Oyster Shell 500 MG TABS, Take 500 mg by mouth 2 times daily , Disp: , Rfl:     calcium carbonate (TUMS) 500 MG chewable tablet, Take 1 tablet by mouth 3 times daily as needed for Heartburn, Disp: , Rfl:     simvastatin (ZOCOR) 40 MG tablet, Take 40 mg by mouth nightly., Disp: , Rfl:         ALLERGIES:         Allergies   Allergen Reactions    Prednisone Anxiety    Compazine [Prochlorperazine Maleate]      Penicillin V Potassium      Penicillins Other (See Comments)       Shock- received meropenem and ceftriaxone wo issues 2020         FAMILY HISTORY:   Family History             Problem Relation Age of Onset    Cancer Mother           uterine    Heart Attack Mother      Heart Attack Father      Other Maternal Grandfather           foot gangrene    Other Paternal Grandmother           bleeding ulcers    Other Paternal Grandfather           lung cancer               SOCIAL HISTORY:   Social History               Socioeconomic History    Marital status:        Spouse name: Not on file    Number of children: 2    Years of education: Not on file    Highest education level: Not on file   Occupational History    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   Tobacco Use    Smoking status: Former Smoker       Packs/day: 1.00       Years: 50.00       Pack years: 50.00       Types: Cigarettes    Smokeless tobacco: Never Used    Tobacco comment: quit 1 year ago    Vaping Use    Vaping Use: Former    Substances: Always   Substance and Sexual Activity    Alcohol use: No    Drug use: No    Sexual activity: Never   Other Topics Concern    Not on file   Social History Narrative    Not on file      Social Determinants of Health          Financial Resource Strain:     Difficulty of Paying Living Expenses: Not on file   Food Insecurity:     Worried About [address] in the Last Year: Not on file    [address] in the Last Year: Not on file   Transportation Needs:     Lack of Transportation (Medical): Not on file    Lack of Transportation (Non-Medical):  Not on file   Physical Activity:     Days of Exercise per Week: Not on file    Minutes of Exercise per Session: Not on file   Stress:     Feeling of Stress : Not on file   Social Connections:     Frequency of Communication with Friends and Family: Not on file    Frequency of Social Gatherings with Friends and Family: Not on file    Attends Faith Services: Not on file    Active Member of Clubs or Organizations: Not on file    Attends Club or Organization Meetings: Not on file    Marital Status: Not on file   Intimate Partner Violence:     Fear of Current or Ex-Partner: Not on file    Emotionally Abused: Not on file    Physically Abused: Not on file    Sexually Abused: Not on file   Housing Stability:     Unable to Pay for Housing in the Last Year: Not on file    Number of [place] in the Last Year: Not on file    Unstable Housing in the Last Year: Not on file            REVIEW OF SYSTEMS: A 12-point review of systemswas obtained and pertinent positives and negatives were enumerated above in the history of present illness. All other reviewed systems / symptoms were negative.     Review of Systems   Constitutional: Negative for appetite change, fatigue and unexpected weight change. HENT: Negative for trouble swallowing. Respiratory: Negative for cough, choking and wheezing. Cardiovascular: Negative for chest pain, palpitations and leg swelling. Gastrointestinal: Positive for abdominal pain (indigestion/burning.), constipation, diarrhea, nausea and vomiting. Negative for abdominal distention, anal bleeding, blood in stool and rectal pain. Genitourinary: Negative for difficulty urinating. Allergic/Immunologic: Negative for environmental allergies and food allergies. Neurological: Positive for dizziness. Negative for weakness, light-headedness, numbness and headaches. Hematological: Bruises/bleeds easily. Psychiatric/Behavioral: Negative for sleep disturbance.  The patient is not nervous/anxious.                LABORATORY DATA: Reviewed        Lab Results   Component Value Date     WBC 5.2 12/19/2021     HGB 10.8 (L) 12/19/2021     HCT 36.4 12/19/2021     MCV 93.3 12/19/2021    12/19/2021      12/19/2021     K 4.3 12/19/2021      12/19/2021     CO2 20 12/19/2021     BUN 6 (L) 12/19/2021     CREATININE 0.86 12/19/2021     LABALBU 3.3 (L) 12/17/2021     BILITOT 0.26 (L) 12/17/2021     ALKPHOS 148 (H) 12/17/2021     AST 17 12/17/2021     ALT 19 12/17/2021     INR 1.0 12/19/2021            Lab Results   Component Value Date     RBC 3.90 (L) 12/19/2021     HGB 10.8 (L) 12/19/2021     MCV 93.3 12/19/2021     MCH 27.7 12/19/2021     MCHC 29.7 12/19/2021     RDW 16.6 (H) 12/19/2021     MPV 10.8 12/19/2021     BASOPCT 0 12/17/2021     LYMPHSABS 1.32 12/17/2021     MONOSABS 0.52 12/17/2021     NEUTROABS 7.00 12/17/2021     EOSABS 0.34 12/17/2021     BASOSABS 0.03 12/17/2021            DIAGNOSTIC TESTING:      CT ABDOMEN PELVIS W IV CONTRAST Additional Contrast? None     Result Date: 12/19/2021  EXAMINATION: CT OF THE ABDOMEN AND PELVIS WITH CONTRAST 12/18/2021 12:04 am TECHNIQUE: CT of the abdomen and pelvis was performed with the administration of intravenous contrast. Multiplanar reformatted images are provided for review. Dose modulation, iterative reconstruction, and/or weight based adjustment of the mA/kV was utilized to reduce the radiation dose to as low as reasonably achievable. COMPARISON: 11/23/2021 HISTORY: ORDERING SYSTEM PROVIDED HISTORY: concern for bowel obstruction TECHNOLOGIST PROVIDED HISTORY: concern for bowel obstruction Decision Support Exception - unselect if not a suspected or confirmed emergency medical condition->Emergency Medical Condition (MA) Reason for Exam: Concern for bowel obstruction FINDINGS: Lower Chest: Trace bilateral pleural effusions. Consolidative opacities in the lung bases. The distal esophagus is dilated and fluid filled. There is mild wall thickening in the distal esophagus extending into the GE junction. Small hiatal hernia. Organs: The liver is decreased in attenuation.   The liver, spleen, pancreas and adrenal glands are without focal abnormality. Status post cholecystectomy. Stable very mild biliary duct dilation. The right kidney is not visualized. No left renal calculus or hydronephrosis. There is a subcentimeter left renal cyst. GI/Bowel: Mildly dilated transverse colon measuring up to 7 cm. No other dilated loops of bowel or bowel wall thickening. Moderate amount of stool in the colon. The appendix is not visualized. No free air. Pelvis: No bladder wall thickening. No pathologically enlarged adenopathy or free fluid. Status post hysterectomy. Peritoneum/Retroperitoneum: The aorta is normal in caliber with mild atherosclerosis. The celiac axis, SMA and CHARLEY are patent. The portal venous system is patent. No pathologically enlarged adenopathy. No ascites or drainable fluid collection. Bones/Soft Tissues: Postsurgical changes from right hip arthroplasty. No acute findings in the bones or soft tissues.      1. Mildly dilated transverse colon without associated obstruction. Findings likely reflect ileus. Mild-to-moderate amount of stool in the colon. No small bowel obstruction. 2. Trace bilateral pleural effusions. Bibasilar atelectasis versus pneumonia. 3. Mild wall thickening of the distal esophagus and GE junction with dilated upstream esophagus. Recommend correlation with endoscopy. 4. Mild hepatic steatosis. RECOMMENDATIONS: Unavailable      XR CHEST PORTABLE     Result Date: 12/17/2021  EXAMINATION: ONE XRAY VIEW OF THE CHEST 12/17/2021 10:56 pm COMPARISON: Chest x-ray 11/23/2021 and chest CT performed 11/24/2021 HISTORY: ORDERING SYSTEM PROVIDED HISTORY: cough, body aches, shortness of breath TECHNOLOGIST PROVIDED HISTORY: cough, body aches, shortness of breath Reason for Exam: Upright port, cough, body aches, abd pain FINDINGS: The cardiomediastinal silhouette is mildly enlarged. Patchy bibasilar and upper lung opacities most confluent within right upper lung worrisome for multifocal pneumonia.   Chronic changes within right lung base not well visualize. [place] No pleural effusion or pneumothorax is present.      Progression of multifocal airspace opacities most confluent within both upper lungs worrisome for developing pneumonia Follow-up to assure resolution following medical treatment course is recommended.            PHYSICAL EXAMINATION: Vital signs reviewed per the nursing documentation.      BP 89/64   Pulse 70   Temp 97.2 °F (36.2 °C)   There is no height or weight on file to calculate BMI. Physical Exam  Vitals and nursing note reviewed. Constitutional:       General: She is not in acute distress. Appearance: She is well-developed. She is not diaphoretic. HENT:      Head: Normocephalic. Mouth/Throat:      Pharynx: No oropharyngeal exudate. Eyes:      General: No scleral icterus. Pupils: Pupils are equal, round, and reactive to light. Neck:      Thyroid: No thyromegaly. Vascular: No JVD. Trachea: No tracheal deviation. Cardiovascular:      Rate and Rhythm: Normal rate and regular rhythm. Heart sounds: Normal heart sounds. No murmur heard.       Pulmonary:      Effort: Pulmonary effort is normal. No respiratory distress. Breath sounds: Normal breath sounds. No wheezing. Abdominal:      General: Bowel sounds are normal. There is no distension. Palpations: Abdomen is soft. Tenderness: There is no abdominal tenderness. There is no guarding or rebound. Comments: No ascites   Musculoskeletal:         General: Normal range of motion. Cervical back: Normal range of motion and neck supple. Skin:     General: Skin is warm. Coloration: Skin is not pale. Findings: No erythema or rash. Comments: She is not diaphoretic   Neurological:      Mental Status: She is alert and oriented to person, place, and time. Deep Tendon Reflexes: Reflexes are normal and symmetric.    Psychiatric:         Behavior: Behavior normal.         Thought Content: Thought content normal.         Judgment: Judgment normal.               IMPRESSION: Ms. Tierra Salamanca is a 68 y.o. female with        Diagnosis Orders   1. Anemia, blood loss  COLONOSCOPY W/ OR W/O BIOPSY     Vitamin B12     Folate   2. S/P Nissen fundoplication (without gastrostomy tube) procedure      3. Esophageal dilatation      4. Elevated liver enzymes      5. Biliary stent obstruction, subsequent encounter         Will add Reglan cautiously I told her she can take it instead of the Carafate and just continue with the PPI  I will take it from there  She is anemic with a hemoglobin running around 10-11  Her iron is been normal we will check the rest of her anemia work-up I will proceed with a colonoscopy anyway     Diet/life style/natural hx /complication of the dx were all explained in details   Past medical, past surgical, social history, psychiatric history, medications or allergies, all reviewed and  updated     Thank you for allowing me to participate in the care of Ms. Tierra Salamanca. For any further questions please do not hesitate to contact me.     I have reviewed and agree with the ROS entered by the MA/RN.      Note is dictated utilizing voice recognition software. Unfortunately this leads to occasional typographical errors.  Please contact our office if you have any questions.        Chuck Frias MD  [facility]  O: #[phone]                        Revision History

## 2022-01-28 NOTE — OP NOTE
PROCEDURE NOTE    DATE OF PROCEDURE: 1/28/2022    SURGEON: Jhony Lerner MD  Facility : [facility] [address] NOLAN  ASSISTANT: None  Anesthesia: MAC  PREOPERATIVE DIAGNOSIS:   Anemia    POSTOPERATIVE DIAGNOSIS: as described below    OPERATION: Total colonoscopy     ANESTHESIA: Moderate Sedation    ESTIMATED BLOOD LOSS: less than 50     COMPLICATIONS: None. SPECIMENS:  Was Obtained:     Cecal polyp 9 mm removed with a snare    Sigmoid polyp 9 mm removed with the snare    Suboptimal prep    Hemorrhoids    HISTORY: The patient is a 68y.o. year old female with history of above preop diagnosis. I recommended colonoscopy with possible biopsy or polypectomy and I explained the risk, benefits, expected outcome, and alternatives to the procedure. Risks included but are not limited to bleeding, infection, respiratory distress, hypotension, and perforation of the colon and possibility of missing a lesion. The patient understands and is in agreement. The patient was counseled at length about the risks of jhoan Covid-19 during their perioperative period and any recovery window from their procedure. The patient was made aware that jhoan Covid-19  may worsen their prognosis for recovering from their procedure  and lend to a higher morbidity and/or mortality risk. All material risks, benefits, and reasonable alternatives including postponing the procedure were discussed. The patient does wish to proceed with the procedure at this time. PROCEDURE: The patient was given IV conscious sedation. The patient's SPO2 remained above 90% throughout the procedure. The colonoscope was inserted per rectum and advanced under direct vision to the cecum without difficulty. Post sedation note : The patient's SPO2 remained above 90% throughout the procedure. the vital signs remained stable , and no immediate complication form the procedure noted, patient will be ready for d/c when criteria is met .         The prep was poor.      Findings:  Terminal ileum: normal    Cecum/Ascending colon: abnormal: Cecal polyp 9 mm removed with a snare          Transverse colon: normal    Descending/Sigmoid colon: abnormal: Sigmoid polyp 9 mm removed with the snare    Rectum/Anus: examined in normal and retroflexed positions and was abnormal: Suboptimal prep    Hemorrhoids      Withdrawal Time was (minutes): 10    The colon was decompressed and the scope was removed. The patient tolerated the procedure well. Recommendations/Plan:   1. Lifestyle and dietary modifications as discussed  2. F/U Biopsies  3. F/U In OfficeYes  4. Discussed with the family  5.  Repeat colonoscopy [id number]    Electronically signed by All Crandall MD  on 1/28/2022 at 11:45 AM

## 2022-01-28 NOTE — ANESTHESIA PRE PROCEDURE
Department of Anesthesiology  Preprocedure Note       Name:  Good Kapoor   Age:  68 y.o.  :  1945                                          MRN:  [id number]         Date:  2022      Surgeon: Elbert Jacques):  Paulette Lugo MD    Procedure: Procedure(s):  COLONOSCOPY DIAGNOSTIC    Medications prior to admission:   Prior to Admission medications    Medication Sig Start Date End Date Taking? Authorizing Provider   bisacodyl (DULCOLAX) 5 MG EC tablet TAKE 2 TABS AS INSTRUCTED BY THE OFFICE THE DAY PRIOR TO COLONOSCOPY 22   Antoine Houser MD   polyethylene glycol (GLYCOLAX) 17 GM/SCOOP powder Use as directed by following your patient instructions given by office. 22   Antoine Houser MD   metoclopramide (REGLAN) 5 MG tablet Take 1 tablet by mouth 4 times daily 22   Antoine Houser MD   polyethylene glycol (GLYCOLAX) 17 g packet Take 17 g by mouth daily as needed for Constipation 21   Cordelia Barthel, MD   QUEtiapine (SEROQUEL) 100 MG tablet Take 1 tablet by mouth nightly for 3 days 21  Cordelia Barthel, MD   QUEtiapine (SEROQUEL) 25 MG tablet Take 1 tablet by mouth every morning (before breakfast) for 3 days 21  Cordelia Barthel, MD   traZODone (DESYREL) 100 MG tablet Take 1 tablet by mouth nightly for 3 days 21  Cordelia Barthel, MD   gabapentin (NEURONTIN) 300 MG capsule Take 1 capsule by mouth 3 times daily for 3 days.  21  Cordelia Barthel, MD   escitalopram (LEXAPRO) 10 MG tablet Take 10 mg by mouth daily    Historical Provider, MD   Folic Acid-Cholecalciferol 1-2000 MG-UNIT TABS Take by mouth    Historical Provider, MD   busPIRone (BUSPAR) 10 MG tablet Take 1 tablet by mouth 2 times daily 21   Johan Griffiths MD   pantoprazole (PROTONIX) 40 MG tablet Take 1 tablet by mouth 2 times daily 21  Cordelia Barthel, MD   traZODone (DESYREL) 100 MG tablet Take 1 tablet by mouth nightly for 7 days 21 Oriana Willett MD   bisacodyl (DULCOLAX) 10 MG suppository Place 10 mg rectally daily as needed for Constipation    Historical Provider, MD   Sodium Phosphates (FLEET) 7-19 GM/118ML Place 1 enema rectally As needed after no BM for 5 days    Historical Provider, MD   clotrimazole-betamethasone (LOTRISONE) 1-0.05 % cream Apply topically 2 times daily Apply topically 2 times daily. Historical Provider, MD   budesonide-formoterol (SYMBICORT) 160-4.5 MCG/ACT AERO Inhale 2 puffs into the lungs 2 times daily    Historical Provider, MD   ferrous sulfate (FE TABS 325) 325 (65 Fe) MG EC tablet Take 1 tablet by mouth daily (with breakfast) 12/16/20   Warren Primrose, APRN - NP   amitriptyline (ELAVIL) 25 MG tablet Take 1 tablet by mouth nightly 7/14/20   Liu Charles MD   metoprolol succinate (TOPROL XL) 25 MG extended release tablet Take 1 tablet by mouth daily 5/30/20   William Lazaro MD   sucralfate (CARAFATE) 1 GM tablet Take 1 tablet by mouth 4 times daily 4/6/20   Alda Salazar MD   Oyster Shell 500 MG TABS Take 500 mg by mouth 2 times daily     Historical Provider, MD   calcium carbonate (TUMS) 500 MG chewable tablet Take 1 tablet by mouth 3 times daily as needed for Heartburn    Historical Provider, MD   simvastatin (ZOCOR) 40 MG tablet Take 40 mg by mouth nightly. Historical Provider, MD       Current medications:    No current facility-administered medications for this encounter. Allergies: Allergies   Allergen Reactions    Prednisone Anxiety    Compazine [Prochlorperazine Maleate]     Penicillin V Potassium     Penicillins Other (See Comments)     Shock- received meropenem and ceftriaxone wo issues 2020       Problem List:    Patient Active Problem List   Diagnosis Code    Frequency of urination R35.0    Backache M54.9    GERD (gastroesophageal reflux disease) K21.9    MVP (mitral valve prolapse) I34.1    Depression F32. A    Anxiety F41.9    BENEDICT (acute kidney injury) ([facility] [address] N17.9  Dysphagia R13.10    Hiatal hernia K44.9    History of repair of hiatal hernia Z98.890, Z87.19    Centrilobular emphysema ([facility]) J43.2    Esophageal dilatation K22.89    Shock ([facility]) R57.9    Fever R50.9    Critical illness polyneuropathy ([facility]) G62.81    Gastric outlet obstruction K31.1    Recurrent UTI N39.0    Tracheostomy in place ([address] Z93.0    H/O gastric ulcer Z87.11    Hyperlipidemia E78.5    Essential hypertension I10    Tobacco abuse Z72.0    Chronic respiratory failure with hypoxia ([facility]) J96.11    S/P percutaneous endoscopic gastrostomy (PEG) tube placement ([facility]) Z93.1    S/P Nissen fundoplication (without gastrostomy tube) procedure Z98.890    Anemia due to blood loss D50.0    Phlebitis after infusion T80. 1XXA, I80.9    Esophagitis determined by endoscopy K20.90    Expressive aphasia R47.01    Transient speech disturbance R47.9    Speech disturbance R47.9    Coffee ground emesis K92.0    Acute cystitis without hematuria N30.00    Ulcerative esophagitis K22.10    Lower abdominal pain R10.30    Ileus ([facility]) K56.7    Chronic pain G89.29    Dyspnea on exertion R06.00    Orthostatic hypotension I95.1    Stage 3 chronic kidney disease ([facility]) N18.30    Difficulty walking R26.2    Class 1 obesity in adult E66.9    Generalized abdominal pain R10.84    Multifocal pneumonia J18.9    Other constipation K59.09       Past Medical History:        Diagnosis Date    Anxiety     Arthritis     Back pain     Bronchitis     Caffeine use     8 coffee / day    Carpal tunnel syndrome     Cataracts, bilateral     Constipation     Depression     Diarrhea     GERD (gastroesophageal reflux disease)     H/O gastric ulcer     Hyperlipidemia     Hypertension     Mumps     MVP (mitral valve prolapse)     Dr. Joseph Reyes in May 2019    Recurrent UTI     Dr. Doyle Feeling    Sinusitis     Trache[facility] in place [facility])     Ulcerative esophagitis     Wellness examination     Dr. Nigel Harris seen in Jan 2020       Past Surgical History:        Procedure Laterality Date    APPENDECTOMY      CARPAL TUNNEL RELEASE      CHOLECYSTECTOMY, LAPAROSCOPIC N/A 05/22/2020    XI LAPAROSCOPIC ROBOTIC CHOLECYSTECTOMY, PYLOROPLASTY performed by Gretel Vargas MD at [facility] ERCP  05/21/2020    with stent insertion, balloon dilation, sphinctereotomy    ERCP  05/21/2020    ** CASE IN OR WITY GI STAFF** ERCP STENT INSERTION performed by Alex Goodrich MD at [facility]    ERCP  05/21/2020    ** CASE IN OR WITH GI STAFF**ERCP SPHINCTER/PAPILLOTOMY performed by Alex Goodrich MD at [facility]    ERCP  05/21/2020    ** CASE IN OR WITH GI STAFF**ERCP DILATION BALLOON performed by Alex Goodrich MD at [facility]    ERCP N/A 2/8/2021    ERCP STENT REMOVAL performed by Chai Kulkarni MD at [facility]    ERCP  2/8/2021    ERCP STONE REMOVAL performed by Chai Kulkarni MD at [address]    GASTRIC FUNDOPLICATION N/A 95/99/5167    XI LAPAROSCOPIC ROBOTIC HIATAL HERNIA REPAIR, CHERYL FUNDOPLICATION performed by Gretel Vargas MD at [facility] N/A 03/27/2020    EGD PEG TUBE PLACEMENT performed by Gretel Vargas MD at [facility] N/A 2/8/2021    **CASE IN O.R. W/ GI STAFF - EGD WITH REMOVAL PEG TUBE performed by Chai Kulkarni MD at [facility]    [address] CATH POWER PICC TRIPLE  03/04/2020         HYSTERECTOMY      Abdominal    JOINT REPLACEMENT Right     right hip    OVARY REMOVAL      RHINOPLASTY      TONSILLECTOMY      TOTAL HIP ARTHROPLASTY      TOTAL NEPHRECTOMY      atrophic from infections, age 13   Duana Big TRACHEOSTOMY N/A 03/27/2020    **ADD ON **WANTS 10:00AM **TRACHEOTOMY performed by Gretel Vargas MD at [facility] 04/02/2020    TRACHEOTOMY EXCHANGE performed by Gretel Vargas MD at [facility] GASTROINTESTINAL ENDOSCOPY N/A 03/17/2020    BEDSIDE EGD ESOPHAGOGASTRODUODENOSCOPY (ICU) performed by Deena Graham MD at [address] [place] [address] N/A 05/18/2020    EGD BIOPSY performed by Ayse Guerra MD at [address] [place] [address]  05/18/2020    EGD DILATION BALLOON performed by Ayse Guerra MD at [address] [place] [address] N/A 07/06/2020    ** CASE IN O.R. WITH GI STAFF** EGD ESOPHAGOGASTRODUODENOSCOPY performed by Edwin Garcia MD at [address] [place] [address] 11/09/2020    EGD DIAGNOSTIC ONLY performed by Delmi Mendoza MD at [address] [place] [address]  02/08/2021    - EGD WITH REMOVAL PEG TUBE,ERCP STENT REMOVAL,ERCP STONE REMOVAL    UPPER GASTROINTESTINAL ENDOSCOPY N/A 11/24/2021    EGD BIOPSY performed by Delmi Mendoza MD at [facility]       Social History:    Social History     Tobacco Use    Smoking status: Former Smoker     Packs/day: 1.00     Years: 50.00     Pack years: 50.00     Types: Cigarettes    Smokeless tobacco: Never Used    Tobacco comment: quit 1 year ago    Substance Use Topics    Alcohol use: No                                Counseling given: Not Answered  Comment: quit 1 year ago       Vital Signs (Current):   Vitals:    01/28/22 1006 01/28/22 1011   BP: (!) 126/48    Pulse: 72    Resp: 18    Temp: 97.3 °F (36.3 °C)    TempSrc: Temporal    SpO2: 99%    Weight:  179 lb (81.2 kg)   Height:  5' 2\" (1.575 m)                                              BP Readings from Last 3 Encounters:   01/28/22 (!) 126/48   01/04/22 89/64   12/19/21 (!) 108/56       NPO Status:                                                                                 BMI:   Wt Readings from Last 3 Encounters:   01/28/22 179 lb (81.2 kg)   12/17/21 180 lb (81.6 kg)   11/30/21 180 lb 1.9 oz (81.7 kg)     Body mass index is 32.74 kg/m².     CBC:   Lab Results   Component Value Date    WBC 5.2 12/19/2021    RBC 3.90 12/19/2021    HGB 10.8 12/19/2021    HCT 36.4 12/19/2021    MCV 93.3 12/19/2021    RDW 16.6 12/19/2021     12/19/2021       CMP:   Lab Results   Component Value Date     12/19/2021    K 4.3 12/19/2021     12/19/2021    CO2 20 12/19/2021    BUN 6 12/19/2021    CREATININE 0.86 12/19/2021    GFRAA >60 12/19/2021    LABGLOM >60 12/19/2021    GLUCOSE 95 12/19/2021    PROT 6.4 12/17/2021    CALCIUM 8.3 12/19/2021    BILITOT 0.26 12/17/2021    ALKPHOS 148 12/17/2021    AST 17 12/17/2021    ALT 19 12/17/2021       POC Tests: No results for input(s): POCGLU, POCNA, POCK, POCCL, POCBUN, POCHEMO, POCHCT in the last 72 hours. Coags:   Lab Results   Component Value Date    PROTIME 10.6 12/19/2021    INR 1.0 12/19/2021    APTT 27.2 11/23/2021       HCG (If Applicable): No results found for: PREGTESTUR, PREGSERUM, HCG, HCGQUANT     ABGs: No results found for: PHART, PO2ART, DSD3ORA, QAA5GNR, BEART, X3KDOENN     Type & Screen (If Applicable):  No results found for: LABABO, LABRH    Drug/Infectious Status (If Applicable):  Lab Results   Component Value Date    HEPCAB NONREACTIVE 05/16/2020       COVID-19 Screening (If Applicable):   Lab Results   Component Value Date    COVID19 Not Detected 12/17/2021    COVID19 Not Detected 02/07/2021           Anesthesia Evaluation    Airway: Mallampati: I  TM distance: >3 FB   Neck ROM: full  Mouth opening: > = 3 FB Dental:          Pulmonary:   (+) shortness of breath: chronic and no interval change,      (-) pneumonia                           Cardiovascular:    (+) hypertension:,     (-)  angina          Echocardiogram reviewed                  Neuro/Psych:               GI/Hepatic/Renal:             Endo/Other:                     Abdominal:             Vascular:           Other Findings:             Anesthesia Plan      MAC     ASA 3                                 Rowena Bolton MD   1/28/2022

## 2022-01-31 LAB — SURGICAL PATHOLOGY REPORT: NORMAL

## 2022-02-09 ENCOUNTER — TELEPHONE (OUTPATIENT)
Dept: GASTROENTEROLOGY | Age: 77
End: 2022-02-09

## 2022-02-09 NOTE — TELEPHONE ENCOUNTER
Writer called and spoke with pt and advised her of results. Appt is ok to be left where it is. Writer also offered to call son and advise him of results. She declines and states she will let him know.      Please note and close

## 2022-02-09 NOTE — TELEPHONE ENCOUNTER
Patient recently had a colonoscopy but it was not noted when she should follow up in the office. Currently scheduled for 7/12/22. Is this okay or bring in sooner?

## 2022-05-06 ENCOUNTER — APPOINTMENT (OUTPATIENT)
Dept: CT IMAGING | Age: 77
DRG: 690 | End: 2022-05-06
Payer: MEDICARE

## 2022-05-06 ENCOUNTER — HOSPITAL ENCOUNTER (INPATIENT)
Age: 77
LOS: 2 days | Discharge: NURSING, REHAB OR LONG-TERM CARE | DRG: 690 | End: 2022-05-10
Attending: EMERGENCY MEDICINE | Admitting: EMERGENCY MEDICINE
Payer: MEDICARE

## 2022-05-06 ENCOUNTER — APPOINTMENT (OUTPATIENT)
Dept: GENERAL RADIOLOGY | Age: 77
DRG: 690 | End: 2022-05-06
Payer: MEDICARE

## 2022-05-06 DIAGNOSIS — N30.01 ACUTE CYSTITIS WITH HEMATURIA: Primary | ICD-10-CM

## 2022-05-06 PROBLEM — N39.0 UTI (URINARY TRACT INFECTION): Status: ACTIVE | Noted: 2022-05-06

## 2022-05-06 LAB
-: ABNORMAL
ABSOLUTE EOS #: 0.26 K/UL (ref 0–0.44)
ABSOLUTE IMMATURE GRANULOCYTE: 0.09 K/UL (ref 0–0.3)
ABSOLUTE LYMPH #: 0.61 K/UL (ref 1.1–3.7)
ABSOLUTE MONO #: 0.61 K/UL (ref 0.1–1.2)
ALBUMIN SERPL-MCNC: 3.9 G/DL (ref 3.5–5.2)
ALBUMIN/GLOBULIN RATIO: 1.6 (ref 1–2.5)
ALP BLD-CCNC: 118 U/L (ref 35–104)
ALT SERPL-CCNC: 21 U/L (ref 5–33)
ANION GAP SERPL CALCULATED.3IONS-SCNC: 12 MMOL/L (ref 9–17)
AST SERPL-CCNC: 24 U/L
BACTERIA: ABNORMAL
BASOPHILS # BLD: 0 % (ref 0–2)
BASOPHILS ABSOLUTE: 0 K/UL (ref 0–0.2)
BILIRUB SERPL-MCNC: 0.3 MG/DL (ref 0.3–1.2)
BILIRUBIN URINE: NEGATIVE
BUN BLDV-MCNC: 16 MG/DL (ref 8–23)
CALCIUM SERPL-MCNC: 9.1 MG/DL (ref 8.6–10.4)
CHLORIDE BLD-SCNC: 101 MMOL/L (ref 98–107)
CO2: 23 MMOL/L (ref 20–31)
COLOR: YELLOW
CREAT SERPL-MCNC: 1.13 MG/DL (ref 0.5–0.9)
EOSINOPHILS RELATIVE PERCENT: 3 % (ref 1–4)
EPITHELIAL CELLS UA: ABNORMAL /HPF (ref 0–5)
FLU A ANTIGEN: NEGATIVE
FLU B ANTIGEN: NEGATIVE
GFR AFRICAN AMERICAN: 57 ML/MIN
GFR NON-AFRICAN AMERICAN: 47 ML/MIN
GFR SERPL CREATININE-BSD FRML MDRD: ABNORMAL ML/MIN/{1.73_M2}
GLUCOSE BLD-MCNC: 145 MG/DL (ref 70–99)
GLUCOSE URINE: NEGATIVE
HCT VFR BLD CALC: 45 % (ref 36.3–47.1)
HEMOGLOBIN: 14.2 G/DL (ref 11.9–15.1)
IMMATURE GRANULOCYTES: 1 %
INR BLD: 1
KETONES, URINE: NEGATIVE
LACTIC ACID, SEPSIS WHOLE BLOOD: 1.8 MMOL/L (ref 0.5–1.9)
LACTIC ACID, SEPSIS WHOLE BLOOD: 2.7 MMOL/L (ref 0.5–1.9)
LEUKOCYTE ESTERASE, URINE: ABNORMAL
LIPASE: 17 U/L (ref 13–60)
LYMPHOCYTES # BLD: 7 % (ref 24–43)
MCH RBC QN AUTO: 28.2 PG (ref 25.2–33.5)
MCHC RBC AUTO-ENTMCNC: 31.6 G/DL (ref 28.4–34.8)
MCV RBC AUTO: 89.5 FL (ref 82.6–102.9)
MONOCYTES # BLD: 7 % (ref 3–12)
MORPHOLOGY: ABNORMAL
NITRITE, URINE: NEGATIVE
NRBC AUTOMATED: 0 PER 100 WBC
PDW BLD-RTO: 15.5 % (ref 11.8–14.4)
PH UA: 7 (ref 5–8)
PLATELET # BLD: 146 K/UL (ref 138–453)
PMV BLD AUTO: 11.1 FL (ref 8.1–13.5)
POTASSIUM SERPL-SCNC: 4 MMOL/L (ref 3.7–5.3)
PRO-BNP: 113 PG/ML
PROTEIN UA: NEGATIVE
PROTHROMBIN TIME: 10.9 SEC (ref 9.1–12.3)
RBC # BLD: 5.03 M/UL (ref 3.95–5.11)
RBC UA: ABNORMAL /HPF (ref 0–4)
SARS-COV-2, RAPID: NOT DETECTED
SEG NEUTROPHILS: 82 % (ref 36–65)
SEGMENTED NEUTROPHILS ABSOLUTE COUNT: 7.13 K/UL (ref 1.5–8.1)
SODIUM BLD-SCNC: 136 MMOL/L (ref 135–144)
SPECIFIC GRAVITY UA: 1.01 (ref 1–1.03)
SPECIMEN DESCRIPTION: NORMAL
TOTAL PROTEIN: 6.4 G/DL (ref 6.4–8.3)
TROPONIN, HIGH SENSITIVITY: 19 NG/L (ref 0–14)
TURBIDITY: CLEAR
URINE HGB: NEGATIVE
UROBILINOGEN, URINE: NORMAL
WBC # BLD: 8.7 K/UL (ref 3.5–11.3)
WBC UA: ABNORMAL /HPF (ref 0–5)

## 2022-05-06 PROCEDURE — 2700000000 HC OXYGEN THERAPY PER DAY

## 2022-05-06 PROCEDURE — 83605 ASSAY OF LACTIC ACID: CPT

## 2022-05-06 PROCEDURE — G0378 HOSPITAL OBSERVATION PER HR: HCPCS

## 2022-05-06 PROCEDURE — 96374 THER/PROPH/DIAG INJ IV PUSH: CPT

## 2022-05-06 PROCEDURE — 87040 BLOOD CULTURE FOR BACTERIA: CPT

## 2022-05-06 PROCEDURE — 96365 THER/PROPH/DIAG IV INF INIT: CPT

## 2022-05-06 PROCEDURE — 74177 CT ABD & PELVIS W/CONTRAST: CPT

## 2022-05-06 PROCEDURE — 6360000004 HC RX CONTRAST MEDICATION: Performed by: STUDENT IN AN ORGANIZED HEALTH CARE EDUCATION/TRAINING PROGRAM

## 2022-05-06 PROCEDURE — 2580000003 HC RX 258: Performed by: STUDENT IN AN ORGANIZED HEALTH CARE EDUCATION/TRAINING PROGRAM

## 2022-05-06 PROCEDURE — A4216 STERILE WATER/SALINE, 10 ML: HCPCS | Performed by: STUDENT IN AN ORGANIZED HEALTH CARE EDUCATION/TRAINING PROGRAM

## 2022-05-06 PROCEDURE — 96375 TX/PRO/DX INJ NEW DRUG ADDON: CPT

## 2022-05-06 PROCEDURE — 83880 ASSAY OF NATRIURETIC PEPTIDE: CPT

## 2022-05-06 PROCEDURE — 80053 COMPREHEN METABOLIC PANEL: CPT

## 2022-05-06 PROCEDURE — 36415 COLL VENOUS BLD VENIPUNCTURE: CPT

## 2022-05-06 PROCEDURE — 99285 EMERGENCY DEPT VISIT HI MDM: CPT

## 2022-05-06 PROCEDURE — 6370000000 HC RX 637 (ALT 250 FOR IP): Performed by: EMERGENCY MEDICINE

## 2022-05-06 PROCEDURE — 87086 URINE CULTURE/COLONY COUNT: CPT

## 2022-05-06 PROCEDURE — 84484 ASSAY OF TROPONIN QUANT: CPT

## 2022-05-06 PROCEDURE — 83690 ASSAY OF LIPASE: CPT

## 2022-05-06 PROCEDURE — 85610 PROTHROMBIN TIME: CPT

## 2022-05-06 PROCEDURE — 93005 ELECTROCARDIOGRAM TRACING: CPT | Performed by: STUDENT IN AN ORGANIZED HEALTH CARE EDUCATION/TRAINING PROGRAM

## 2022-05-06 PROCEDURE — 71045 X-RAY EXAM CHEST 1 VIEW: CPT

## 2022-05-06 PROCEDURE — 87804 INFLUENZA ASSAY W/OPTIC: CPT

## 2022-05-06 PROCEDURE — 87635 SARS-COV-2 COVID-19 AMP PRB: CPT

## 2022-05-06 PROCEDURE — 6370000000 HC RX 637 (ALT 250 FOR IP): Performed by: STUDENT IN AN ORGANIZED HEALTH CARE EDUCATION/TRAINING PROGRAM

## 2022-05-06 PROCEDURE — 87186 SC STD MICRODIL/AGAR DIL: CPT

## 2022-05-06 PROCEDURE — 94667 MNPJ CHEST WALL 1ST: CPT

## 2022-05-06 PROCEDURE — 2500000003 HC RX 250 WO HCPCS: Performed by: STUDENT IN AN ORGANIZED HEALTH CARE EDUCATION/TRAINING PROGRAM

## 2022-05-06 PROCEDURE — 87077 CULTURE AEROBIC IDENTIFY: CPT

## 2022-05-06 PROCEDURE — 96361 HYDRATE IV INFUSION ADD-ON: CPT

## 2022-05-06 PROCEDURE — 81001 URINALYSIS AUTO W/SCOPE: CPT

## 2022-05-06 PROCEDURE — 6360000002 HC RX W HCPCS: Performed by: STUDENT IN AN ORGANIZED HEALTH CARE EDUCATION/TRAINING PROGRAM

## 2022-05-06 PROCEDURE — 85025 COMPLETE CBC W/AUTO DIFF WBC: CPT

## 2022-05-06 RX ORDER — FLUTICASONE PROPIONATE 50 MCG
1 SPRAY, SUSPENSION (ML) NASAL DAILY
COMMUNITY

## 2022-05-06 RX ORDER — PANTOPRAZOLE SODIUM 40 MG/1
40 TABLET, DELAYED RELEASE ORAL 2 TIMES DAILY
Status: DISCONTINUED | OUTPATIENT
Start: 2022-05-06 | End: 2022-05-10 | Stop reason: HOSPADM

## 2022-05-06 RX ORDER — ENOXAPARIN SODIUM 100 MG/ML
40 INJECTION SUBCUTANEOUS DAILY
Status: DISCONTINUED | OUTPATIENT
Start: 2022-05-06 | End: 2022-05-10 | Stop reason: HOSPADM

## 2022-05-06 RX ORDER — ACETAMINOPHEN 325 MG/1
650 TABLET ORAL EVERY 4 HOURS PRN
Status: DISCONTINUED | OUTPATIENT
Start: 2022-05-06 | End: 2022-05-10 | Stop reason: HOSPADM

## 2022-05-06 RX ORDER — ONDANSETRON 4 MG/1
4 TABLET, ORALLY DISINTEGRATING ORAL EVERY 8 HOURS PRN
Status: DISCONTINUED | OUTPATIENT
Start: 2022-05-06 | End: 2022-05-10 | Stop reason: HOSPADM

## 2022-05-06 RX ORDER — SODIUM CHLORIDE 9 MG/ML
INJECTION, SOLUTION INTRAVENOUS PRN
Status: DISCONTINUED | OUTPATIENT
Start: 2022-05-06 | End: 2022-05-10 | Stop reason: HOSPADM

## 2022-05-06 RX ORDER — CLONAZEPAM 0.5 MG/1
0.5 TABLET ORAL 2 TIMES DAILY PRN
Status: DISCONTINUED | OUTPATIENT
Start: 2022-05-06 | End: 2022-05-06

## 2022-05-06 RX ORDER — AMITRIPTYLINE HYDROCHLORIDE 25 MG/1
25 TABLET, FILM COATED ORAL NIGHTLY
Status: DISCONTINUED | OUTPATIENT
Start: 2022-05-06 | End: 2022-05-10 | Stop reason: HOSPADM

## 2022-05-06 RX ORDER — ONDANSETRON 2 MG/ML
4 INJECTION INTRAMUSCULAR; INTRAVENOUS EVERY 6 HOURS PRN
Status: DISCONTINUED | OUTPATIENT
Start: 2022-05-06 | End: 2022-05-10 | Stop reason: HOSPADM

## 2022-05-06 RX ORDER — METOCLOPRAMIDE 5 MG/1
5 TABLET ORAL
Status: DISCONTINUED | OUTPATIENT
Start: 2022-05-07 | End: 2022-05-10 | Stop reason: HOSPADM

## 2022-05-06 RX ORDER — ESCITALOPRAM OXALATE 10 MG/1
10 TABLET ORAL DAILY
Status: DISCONTINUED | OUTPATIENT
Start: 2022-05-06 | End: 2022-05-10 | Stop reason: HOSPADM

## 2022-05-06 RX ORDER — SODIUM CHLORIDE 0.9 % (FLUSH) 0.9 %
5-40 SYRINGE (ML) INJECTION EVERY 12 HOURS SCHEDULED
Status: DISCONTINUED | OUTPATIENT
Start: 2022-05-06 | End: 2022-05-10 | Stop reason: HOSPADM

## 2022-05-06 RX ORDER — CLONAZEPAM 0.5 MG/1
0.5 TABLET ORAL 2 TIMES DAILY PRN
Status: DISCONTINUED | OUTPATIENT
Start: 2022-05-06 | End: 2022-05-10 | Stop reason: HOSPADM

## 2022-05-06 RX ORDER — 0.9 % SODIUM CHLORIDE 0.9 %
1000 INTRAVENOUS SOLUTION INTRAVENOUS ONCE
Status: COMPLETED | OUTPATIENT
Start: 2022-05-06 | End: 2022-05-06

## 2022-05-06 RX ORDER — LORAZEPAM 2 MG/ML
0.5 INJECTION INTRAMUSCULAR ONCE
Status: COMPLETED | OUTPATIENT
Start: 2022-05-06 | End: 2022-05-06

## 2022-05-06 RX ORDER — LORAZEPAM 1 MG/1
1 TABLET ORAL NIGHTLY PRN
Status: DISCONTINUED | OUTPATIENT
Start: 2022-05-06 | End: 2022-05-10 | Stop reason: HOSPADM

## 2022-05-06 RX ORDER — SODIUM CHLORIDE 0.9 % (FLUSH) 0.9 %
5-40 SYRINGE (ML) INJECTION PRN
Status: DISCONTINUED | OUTPATIENT
Start: 2022-05-06 | End: 2022-05-10 | Stop reason: HOSPADM

## 2022-05-06 RX ORDER — BUSPIRONE HYDROCHLORIDE 10 MG/1
10 TABLET ORAL 2 TIMES DAILY
Status: DISCONTINUED | OUTPATIENT
Start: 2022-05-07 | End: 2022-05-10 | Stop reason: HOSPADM

## 2022-05-06 RX ORDER — TRAZODONE HYDROCHLORIDE 100 MG/1
100 TABLET ORAL NIGHTLY
Status: DISCONTINUED | OUTPATIENT
Start: 2022-05-07 | End: 2022-05-10 | Stop reason: HOSPADM

## 2022-05-06 RX ADMIN — SODIUM CHLORIDE 1000 ML: 9 INJECTION, SOLUTION INTRAVENOUS at 12:18

## 2022-05-06 RX ADMIN — IOPAMIDOL 75 ML: 755 INJECTION, SOLUTION INTRAVENOUS at 11:40

## 2022-05-06 RX ADMIN — CLONAZEPAM 0.5 MG: 0.5 TABLET ORAL at 15:34

## 2022-05-06 RX ADMIN — ACETAMINOPHEN 650 MG: 325 TABLET ORAL at 17:03

## 2022-05-06 RX ADMIN — MEROPENEM 1000 MG: 1 INJECTION, POWDER, FOR SOLUTION INTRAVENOUS at 14:43

## 2022-05-06 RX ADMIN — FAMOTIDINE 20 MG: 10 INJECTION INTRAVENOUS at 12:18

## 2022-05-06 RX ADMIN — LORAZEPAM 0.5 MG: 2 INJECTION INTRAMUSCULAR; INTRAVENOUS at 12:15

## 2022-05-06 RX ADMIN — SODIUM CHLORIDE, PRESERVATIVE FREE 10 ML: 5 INJECTION INTRAVENOUS at 21:34

## 2022-05-06 ASSESSMENT — PAIN SCALES - GENERAL
PAINLEVEL_OUTOF10: 0
PAINLEVEL_OUTOF10: 7

## 2022-05-06 ASSESSMENT — ENCOUNTER SYMPTOMS
VOMITING: 0
SHORTNESS OF BREATH: 0
BACK PAIN: 0
NAUSEA: 0
ABDOMINAL PAIN: 1

## 2022-05-06 NOTE — ED PROVIDER NOTES
[address]  ED  eMERGENCY dEPARTMENT eNCOUnter   Attending Attestation     Pt Name: Aroldo Jim  MRN: [id number]  [place] 1945  Date of evaluation: 5/6/22       Aroldo Jim is a 68 y.o. female who presents with Fever (neg covid)      History: Patient with fever, abdominal pain, cough from nursing facility. Patient states that she is not short of breath. Patient is here for evaluation. Exam: Heart rate and rhythm are regular. Lungs have mild rhonchi bilaterally. Abdomen is soft with tenderness diffusely. Plan for blood work including abdominal pain work-up and CT of the abdomen to rule out diverticulitis versus obstruction. Will obtain chest x-ray to ensure there is no pneumonia. Will treat symptoms. Consider admission. EKG shows normal sinus rhythm with a rate of 79 beats minute. Normal axis. No ST elevation or depression. T waves are upright. No blocks arrhythmias. Nonspecific EKG with some artifact. I performed a history and physical examination of the patient and discussed management with the resident. I reviewed the residents note and agree with the documented findings and plan of care. Any areas of disagreement are noted on the chart. I was personally present for the key portions of any procedures. I have documented in the chart those procedures where I was not present during the key portions. I have personally reviewed all images and agree with the resident's interpretation. I have reviewed the emergency nurses triage note. I agree with the chief complaint, past medical history, past surgical history, allergies, medications, social and family history as documented unless otherwise noted below. Documentation of the HPI, Physical Exam and Medical Decision Making performed by medical students or scribes is based on my personal performance of the HPI, PE and MDM.  For Phys Assistant/ Nurse Practitioner cases/documentation I have had a face to face evaluation of this patient and have completed at least one if not all key elements of the E/M (history, physical exam, and MDM). Additional findings are as noted. For APC cases I have personally evaluated and examined the patient in conjunction with the APC and agree with the treatment plan and disposition of the patient as recorded by the APC.     Payton Lopez MD  Attending Emergency  Physician       Brett Harper MD  05/06/22 1242

## 2022-05-06 NOTE — ED NOTES
Food tray provided, patient states that she would rather have a turkey Sameera Sampson, RN  05/06/22 2809

## 2022-05-06 NOTE — PROGRESS NOTES
Respiratory evaluation completed. Rhonchi heard throughout all lung fields. Patient states she has been coughing up yellow sputum. Patient given acapella to help facilitate coughing up secretions. Patient instructed on how to use device.

## 2022-05-06 NOTE — ACP (ADVANCE CARE PLANNING)
Advance Care Planning     Advance Care Planning Activator (Inpatient)  Conversation Note      Date of ACP Conversation: 5/6/2022     Conversation Conducted with: Patient with Decision Making Capacity    ACP Activator: Von Cristobal RN        Health Care Decision Maker:     Current Designated Health Care Decision Maker:     Click here to complete Healthcare Decision Makers including section of the Healthcare Decision Maker Relationship (ie \"Primary\")      Care Preferences    Ventilation: \"If you were in your present state of health and suddenly became very ill and were unable to breathe on your own, what would your preference be about the use of a ventilator (breathing machine) if it were available to you? \"      Would the patient desire the use of ventilator (breathing machine)?: yes    \"If your health worsens and it becomes clear that your chance of recovery is unlikely, what would your preference be about the use of a ventilator (breathing machine) if it were available to you? \"     Would the patient desire the use of ventilator (breathing machine)?: Yes      Resuscitation  \"CPR works best to restart the heart when there is a sudden event, like a heart attack, in someone who is otherwise healthy. Unfortunately, CPR does not typically restart the heart for people who have serious health conditions or who are very sick. \"    \"In the event your heart stopped as a result of an underlying serious health condition, would you want attempts to be made to restart your heart (answer \"yes\" for attempt to resuscitate) or would you prefer a natural death (answer \"no\" for do not attempt to resuscitate)? \" yes       [] Yes   [] No   Educated Patient / Mal Nazario regarding differences between Advance Directives and portable DNR orders.     Length of ACP Conversation in minutes:      Conversation Outcomes:  [x] ACP discussion completed  [] Existing advance directive reviewed with patient; no changes to patient's previously recorded wishes  [] New Advance Directive completed  [] Portable Do Not Rescitate prepared for Provider review and signature  [] POLST/POST/MOLST/MOST prepared for Provider review and signature      Follow-up plan:    [] Schedule follow-up conversation to continue planning  [] Referred individual to Provider for additional questions/concerns   [] Advised patient/agent/surrogate to review completed ACP document and update if needed with changes in condition, patient preferences or care setting    [] This note routed to one or more involved healthcare providers

## 2022-05-06 NOTE — CARE COORDINATION
Case Management Initial Discharge Plan  Felton Diop,             Met with:patient to discuss discharge plans. Information verified: address, contacts, phone number, , insurance Yes  [facility]         Emergency Contact/Next of Kin name & number:   PEG LANDRY        Child    [phone]       Who are involved in patient's support system? Steven Reno    PCP: Ondina Castro MD  Date of last visit: several years sees  at [facility]    Living Arrangements:        Home has 1 stories  0 stairs to climb to get into front door, 0stairs to climb to reach second floor  Location of bedroom/bathroom in home main    Patient able to perform ADL's:Assisted    Current Services (outpatient & in home) none  DME equipment: wheelchair and walker  DME provider:     Is patient receiving oral anticoagulation therapy? No    If indicated:   Physician managing anticoagulation treatment: n/a  Where does patient obtain lab work for ATC treatment? n/a    Does patient have any issues/concerns obtaining medications? No  If yes, what are patient's concerns? Is there a preferred Pharmacy after hours or on weekends? No    If yes, which pharmacy? Potential Assistance Needed:       Patient agreeable to home care: No  Blain of choice provided:  n/a    Prior SNF/Rehab Placement and Facility: none  Agreeable to SNF/Rehab: No  Blain of choice provided: n/a     Evaluation: no    Expected Discharge date:       Patient expects to be discharged to: If home: is the family and/or caregiver wiling & able to provide support at home? [facility]  Who will be providing this support?      Follow Up Appointment: Best Day/ Time:      Transportation provider: [address] [place] [address] Way  Transportation arrangements needed for discharge: Yes    Readmission Risk              Risk of Unplanned Readmission:  0             Does patient have a readmission risk score greater than 14?: n/a  If yes, follow-up appointment must be made within 7 days of discharge.      Goals of Care: get better      Educated pt on transitional options, provided freedom of choice and are agreeable with plan      Discharge Plan: Plans to [facility]           Electronically signed by Kristian Earl RN on 5/6/22 at 2:23 PM EDT

## 2022-05-06 NOTE — H&P
[address]  CDU / OBSERVATION ENCOUNTER  ATTENDING NOTE     Pt Name: Ruba Valderrama  MRN: [id number]  [place] 1945  Date of evaluation: 5/6/22  Patient's PCP is :  Rocky Up MD    CHIEF COMPLAINT       Chief Complaint   Patient presents with    Fever     neg covid         HISTORY OF PRESENT ILLNESS    Ruba Valderrama is a 68 y.o. female who presents With fever at home. Patient has arthralgias myalgias and cough. Patient has progressively worsening symptoms. Patient has a history of pneumonia. Patient was tested for COVID earlier and was negative. Patient was feeling short of breath when she arrived and having worsening dyspnea. She does feel like the cough is worsening. Patient has work-up in emergency department which showed evidence of urinary tract infection. Patient was breathing easier but was being treated with meropenem for UTI. Normal lactate. Patient with no desaturations. Patient for reevaluation and follow-up culture results. ID for consult. Location/Symptom: Generalized arthralgias myalgias and fever. Timing/Onset: Days  Provocation: Unclear  Quality: Worsening aching and some shortness of breath with sputum production  Radiation: Whole body discomfort  Severity: Moderate  Timing/Duration: Approximately 1 week  Modifying Factors: Unclear    REVIEW OF SYSTEMS          Review of Systems   Constitutional: Positive for fever. Negative for diaphoresis. HENT: Negative for congestion. Eyes: Negative for visual disturbance. Respiratory: Negative for shortness of breath. Cardiovascular: Negative for chest pain. Gastrointestinal: Positive for abdominal pain. Negative for nausea and vomiting. Endocrine: Negative for polyuria. Genitourinary: Positive for difficulty urinating. Negative for dysuria. Musculoskeletal: Negative for back pain. Skin: Negative for wound. Neurological: Negative for headaches.    Psychiatric/Behavioral: Negative for confusion. (PQRS) Advance directives on face sheet per hospital policy. No change unless specifically mentioned in chart    PAST MEDICAL HISTORY    has a past medical history of Anxiety, Arthritis, Back pain, Bronchitis, Caffeine use, Carpal tunnel syndrome, Cataracts, bilateral, Constipation, COPD (chronic obstructive pulmonary disease) ([address] Depression, Diarrhea, GERD (gastroesophageal reflux disease), H/O gastric ulcer, Hyperlipidemia, Hypertension, Mumps, MVP (mitral valve prolapse), Recurrent UTI, Sinusitis, Tracheostomy in place [facility]), Ulcerative esophagitis, and Wellness examination. I have reviewed the past medical history with the patient and it is  pertinent to this complaint. SURGICAL HISTORY      has a past surgical history that includes Hysterectomy; total nephrectomy; Tonsillectomy; Appendectomy; Cystoscopy; Ovary removal; Tubal ligation; rhinoplasty; Carpal tunnel release; Hand surgery; Total hip arthroplasty; eye surgery; Colonoscopy; joint replacement (Right); Gastric fundoplication (N/A, 23/53/6634); hc cath power picc triple (03/04/2020); Upper gastrointestinal endoscopy (N/A, 03/17/2020); tracheostomy (N/A, 03/27/2020); Gastrostomy tube placement (N/A, 03/27/2020); tracheostomy (N/A, 04/02/2020); Upper gastrointestinal endoscopy (N/A, 05/18/2020); Upper gastrointestinal endoscopy (05/18/2020); ERCP (05/21/2020); ERCP (05/21/2020); ERCP (05/21/2020); ERCP (05/21/2020); Cholecystectomy, laparoscopic (N/A, 05/22/2020); Upper gastrointestinal endoscopy (N/A, 07/06/2020); Upper gastrointestinal endoscopy (N/A, 11/09/2020); Upper gastrointestinal endoscopy (02/08/2021); Gastrostomy tube placement (N/A, 2/8/2021); ERCP (N/A, 2/8/2021); ERCP (2/8/2021); Upper gastrointestinal endoscopy (N/A, 11/24/2021); hernia repair; and Colonoscopy (N/A, 1/28/2022). I have reviewed and agree with Surgical History entered and it is  pertinent to this complaint.      CURRENT MEDICATIONS     clonazePAM (KLONOPIN) tablet 0.5 mg, BID PRN        All medication charted and reviewed. ALLERGIES     is allergic to prednisone, compazine [prochlorperazine maleate], penicillin v potassium, and penicillins. FAMILY HISTORY     She indicated that her mother is . She indicated that her father is . She indicated that both of her sisters are . She indicated that her maternal grandmother is . She indicated that her maternal grandfather is . She indicated that her paternal grandmother is . She indicated that her paternal grandfather is . family history includes Cancer in her mother; Heart Attack in her father and mother; Other in her maternal grandfather, paternal grandfather, and paternal grandmother. The patient denies any pertinent family history. I have reviewed and agree with the family history entered. I have reviewed the Family History and it is not significant to the case    SOCIAL HISTORY      reports that she has quit smoking. Her smoking use included cigarettes. She has a 50.00 pack-year smoking history. She has never used smokeless tobacco. She reports that she does not drink alcohol and does not use drugs. I have reviewed and agree with all Social.  There are no  concerns for substance abuse/use. PHYSICAL EXAM     INITIAL VITALS:  oral temperature is 99.7 °F (37.6 °C). Her blood pressure is 138/65 and her pulse is 69. Her respiration is 18 and oxygen saturation is 96%. CONSTITUTIONAL: AOx4, no apparent distress, patient appears nontoxic.   Patient anxious   HEAD: normocephalic, atraumatic   EYES: PERRLA, EOMI    ENT: moist mucous membranes, uvula midline   NECK: supple, symmetric   BACK: symmetric   LUNGS: clear to auscultation bilaterally   CARDIOVASCULAR: regular rate and rhythm, no murmurs, rubs or gallops   ABDOMEN: soft, non-tender, non-distended with normal active bowel sounds   NEUROLOGIC:  MAEx4, no focal sensory or motor deficits MUSCULOSKELETAL: no clubbing, cyanosis or edema   SKIN: no rash or wounds       DIFFERENTIAL DIAGNOSIS/MDM:     Patient with arthralgias, myalgias, sputum production and cough. Patient with evidence of urinary tract infection. Being admitted for watching culture results and following blood work. Patient for ID consult. DIAGNOSTIC RESULTS         RADIOLOGY:   I directly visualized the following  images and reviewed the radiologist interpretations:    CT ABDOMEN PELVIS W IV CONTRAST Additional Contrast? None    Result Date: 5/6/2022  EXAMINATION: CT OF THE ABDOMEN AND PELVIS WITH CONTRAST 5/6/2022 11:31 am TECHNIQUE: CT of the abdomen and pelvis was performed with the administration of intravenous contrast. Multiplanar reformatted images are provided for review. Dose modulation, iterative reconstruction, and/or weight based adjustment of the mA/kV was utilized to reduce the radiation dose to as low as reasonably achievable. COMPARISON: December 18, 2021 HISTORY: ORDERING SYSTEM PROVIDED HISTORY:  Eval for diverticulitis TECHNOLOGIST PROVIDED HISTORY: Eval for diverticulitis Decision Support Exception - unselect if not a suspected or confirmed emergency medical condition->Emergency Medical Condition (MA) Reason for Exam:  Eval for diverticulitis FINDINGS: Lower Chest: Again demonstrated are the ground-glass opacities in the lingula and right middle lobe probably sequelae of prior infection/inflammatory process. Dilated fluid-filled distal esophagus extending to a small hiatal hernia also redemonstrated. Organs: Hepatic steatosis is present. No focal liver lesion. The gallbladder is surgically absent. No significant biliary ductal dilatation. The spleen is normal in size without focal lesion. The pancreas and the adrenal glands are unremarkable. 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 No left hydronephrosis. 11 mm left lower pole renal cyst redemonstrated. No concerning renal lesion. GI/Bowel:  The patient has had prior appendectomy. No bowel obstruction. Pelvis: Remote hysterectomy. The urinary bladder is unremarkable. Peritoneum/Retroperitoneum: No bulky lymphadenopathy. No ascites. Bones/Soft Tissues: Partially visualized right total hip arthroplasty appears intact. No acute osseous abnormality. No acute process in the abdomen or pelvis. XR CHEST PORTABLE    Result Date: 5/6/2022  EXAMINATION: ONE XRAY VIEW OF THE CHEST 5/6/2022 11:15 am COMPARISON: None. HISTORY: ORDERING SYSTEM PROVIDED HISTORY: eval for pmn TECHNOLOGIST PROVIDED HISTORY: eval for pmn FINDINGS: No acute airspace infiltrates. No pneumothorax or pleural effusion. Normal cardiomediastinal silhouette     No acute cardiopulmonary disease       LABS:  I have reviewed and interpreted all available lab results.   Labs Reviewed   CBC WITH AUTO DIFFERENTIAL - Abnormal; Notable for the following components:       Result Value    RDW 15.5 (*)     Immature Granulocytes 1 (*)     Seg Neutrophils 82 (*)     Lymphocytes 7 (*)     Absolute Lymph # 0.61 (*)     All other components within normal limits   COMPREHENSIVE METABOLIC PANEL W/ REFLEX TO MG FOR LOW K - Abnormal; Notable for the following components:    Glucose 145 (*)     CREATININE 1.13 (*)     Alkaline Phosphatase 118 (*)     GFR Non- 47 (*)     GFR  57 (*)     All other components within normal limits   TROPONIN - Abnormal; Notable for the following components:    Troponin, High Sensitivity 19 (*)     All other components within normal limits   URINALYSIS WITH MICROSCOPIC - Abnormal; Notable for the following components:    Leukocyte Esterase, Urine SMALL (*)     Bacteria, UA MANY (*)     All other components within normal limits   RAPID INFLUENZA A/B ANTIGENS   CULTURE, BLOOD 2   CULTURE, BLOOD 1   CULTURE, URINE   COVID-19, RAPID   LIPASE   BRAIN NATRIURETIC PEPTIDE   LACTATE, SEPSIS   PROTIME-INR   LACTATE, SEPSIS         CDU IMPRESSION / PLAN      Arsenio Purcell Golden Mortimer is a 68 y.o. female who presents with febrile illness uncertain etiology. Patient was evaluated emergency department without clear identification of source. Patient not septic. · Wellness, uncertain etiology, work-up negative for possible urinary source in emergency department. · Treated for suspected urinary tract infection  · Broad-spectrum antibiotic. · Patient for ID consult. · No fevers. No evidence of elevated lactate. Will watch. · Patient not requiring further admission due to lack of signs of sepsis. · Patient anxiety  · Klonopin regularly. · On as needed benzodiazepine. · Regular care in addition to supportive therapy      · Continue home medications and pain control  · Monitor vitals, labs, and imaging  · DISPO: pending consults and clinical improvement    CONSULTS:    IP CONSULT TO HOSPITALIST  IP CONSULT TO INFECTIOUS DISEASES    PROCEDURES:  Not indicated        PATIENT REFERRED TO:    No follow-up provider specified. --  Mirtha Stern MD   Emergency Medicine Attending    This dictation was generated by voice recognition computer software. Although all attempts are made to edit the dictation for accuracy, there may be errors in the transcription that are not intended.

## 2022-05-06 NOTE — ED NOTES
Pt placed on continuous cardiac monitoring, BP, Pulse ox. EKG obtained. IV established and labs drawn.      Zeus Katz RN  05/06/22 1513

## 2022-05-06 NOTE — ED NOTES
Patient requesting meal tray, ordered from dietary and verified order received.       Remberto Mcwilliams RN  05/06/22 [id number]

## 2022-05-06 NOTE — PLAN OF CARE
Problem: Skin/Tissue Integrity  Goal: Absence of new skin breakdown  Description: 1. Monitor for areas of redness and/or skin breakdown  2. Assess vascular access sites hourly  3. Every 4-6 hours minimum:  Change oxygen saturation probe site  4. Every 4-6 hours:  If on nasal continuous positive airway pressure, respiratory therapy assess nares and determine need for appliance change or resting period. Outcome: Progressing     Problem: Skin/Tissue Integrity  Goal: Absence of new skin breakdown  Description: 1. Monitor for areas of redness and/or skin breakdown  2. Assess vascular access sites hourly  3. Every 4-6 hours minimum:  Change oxygen saturation probe site  4. Every 4-6 hours:  If on nasal continuous positive airway pressure, respiratory therapy assess nares and determine need for appliance change or resting period.   Outcome: Progressing     Problem: Safety - Adult  Goal: Free from fall injury  Outcome: Progressing

## 2022-05-06 NOTE — ED PROVIDER NOTES
[facility] ED  Emergency Department Encounter  Emergency Medicine Resident     Pt Name: Radha Martin  MRN: [id number]  [place] 1945  Date of evaluation: 5/6/22  PCP:  Bijan Hylton MD    CHIEF COMPLAINT       Chief Complaint   Patient presents with    Fever     neg covid       HISTORY OFPRESENT ILLNESS  (Location/Symptom, Timing/Onset, Context/Setting, Quality, Duration, Modifying Factors,Severity.)      Radha Martin is a 68 y. o.yo female who presents with fevers. Patient here with 1 week of cough and progressively worsening symptoms, productive cough with yellow sputum has history of pneumonia. Is currently was in a nursing home, was tested for COVID earlier today was negative. Is not on oxygen at home satting 93%, states she does not feel short of breath or hypoxic or she is having worsening dyspnea. States that she does feel like the cough is getting worse,Lower abdomen, has a baseline abdominal pain, states she is he always has abdominal pain. Denies chest pain, headache vision changes or focal deficits. States that she has had a fever today was given Tylenol 1 hour before presentation to the emergency department. States that she had a fever of 103 at nursing home. States she feels generalized weakness all over. Denies recent falls, dysuria though she is incontinent at baseline. PAST MEDICAL / SURGICAL / SOCIAL / FAMILY HISTORY      has a past medical history of Anxiety, Arthritis, Back pain, Bronchitis, Caffeine use, Carpal tunnel syndrome, Cataracts, bilateral, Constipation, COPD (chronic obstructive pulmonary disease) ([address] Depression, Diarrhea, GERD (gastroesophageal reflux disease), H/O gastric ulcer, Hyperlipidemia, Hypertension, Mumps, MVP (mitral valve prolapse), Recurrent UTI, Sinusitis, Tracheostomy in place [facility]), Ulcerative esophagitis, and Wellness examination. has a past surgical history that includes Hysterectomy; total nephrectomy;  Tonsillectomy; Appendectomy; Cystoscopy; Ovary removal; Tubal ligation; rhinoplasty; Carpal tunnel release; Hand surgery; Total hip arthroplasty; eye surgery; Colonoscopy; joint replacement (Right); Gastric fundoplication (N/A, 51/68/0207); hc cath power picc triple (03/04/2020); Upper gastrointestinal endoscopy (N/A, 03/17/2020); tracheostomy (N/A, 03/27/2020); Gastrostomy tube placement (N/A, 03/27/2020); tracheostomy (N/A, 04/02/2020); Upper gastrointestinal endoscopy (N/A, 05/18/2020); Upper gastrointestinal endoscopy (05/18/2020); ERCP (05/21/2020); ERCP (05/21/2020); ERCP (05/21/2020); ERCP (05/21/2020); Cholecystectomy, laparoscopic (N/A, 05/22/2020); Upper gastrointestinal endoscopy (N/A, 07/06/2020); Upper gastrointestinal endoscopy (N/A, 11/09/2020); Upper gastrointestinal endoscopy (02/08/2021); Gastrostomy tube placement (N/A, 2/8/2021); ERCP (N/A, 2/8/2021); ERCP (2/8/2021); Upper gastrointestinal endoscopy (N/A, 11/24/2021); hernia repair; and Colonoscopy (N/A, 1/28/2022). Social History     Socioeconomic History    Marital status:       Spouse name: Not on file    Number of children: 2    Years of education: Not on file    Highest education level: Not on file   Occupational History    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   Tobacco Use    Smoking status: Former Smoker     Packs/day: 1.00     Years: 50.00     Pack years: 50.00     Types: Cigarettes    Smokeless tobacco: Never Used    Tobacco comment: quit 1 year ago    Vaping Use    Vaping Use: Former    Substances: Always   Substance and Sexual Activity    Alcohol use: No    Drug use: No    Sexual activity: Never   Other Topics Concern    Not on file   Social History Narrative    Not on file     Social Determinants of Health     Financial Resource Strain:     Difficulty of Paying Living Expenses: Not on file   Food Insecurity:     Worried About [address] in the Last Year: Not on file    [address] in the Last Year: Not on file Transportation Needs:     Lack of Transportation (Medical): Not on file    Lack of Transportation (Non-Medical): Not on file   Physical Activity:     Days of Exercise per Week: Not on file    Minutes of Exercise per Session: Not on file   Stress:     Feeling of Stress : Not on file   Social Connections:     Frequency of Communication with Friends and Family: Not on file    Frequency of Social Gatherings with Friends and Family: Not on file    Attends Yazidi Services: Not on file    Active Member of [address] [place] [address] [employer] or Organizations: Not on file    Attends Club or Organization Meetings: Not on file    Marital Status: Not on file   Intimate Partner Violence:     Fear of Current or Ex-Partner: Not on file    Emotionally Abused: Not on file    Physically Abused: Not on file    Sexually Abused: Not on file   Housing Stability:     Unable to Pay for Housing in the Last Year: Not on file    Number of [place] in the Last Year: Not on file    Unstable Housing in the Last Year: Not on file       Family History   Problem Relation Age of Onset    Cancer Mother         uterine    Heart Attack Mother     Heart Attack Father     Other Maternal Grandfather         foot gangrene    Other Paternal Grandmother         bleeding ulcers    Other Paternal Grandfather         lung cancer        Allergies:  Prednisone, Compazine [prochlorperazine maleate], Penicillin v potassium, and Penicillins    Home Medications:  Prior to Admission medications    Medication Sig Start Date End Date Taking? Authorizing Provider   loratadine (CLARITIN) 10 MG capsule Take 10 mg by mouth daily    Historical Provider, MD   Cholecalciferol (VITAMIN D) 50 MCG (2000 UT) CAPS capsule Take 1 capsule by mouth    Historical Provider, MD   clonazePAM (KLONOPIN) 0.5 MG tablet Take 0.5 mg by mouth 2 times daily as needed. Historical Provider, MD   LORazepam (ATIVAN) 1 MG tablet Take 1 mg by mouth every 8 hours as needed for Anxiety. Historical Provider, MD   bisacodyl (DULCOLAX) 5 MG EC tablet TAKE 2 TABS AS INSTRUCTED BY THE OFFICE THE DAY PRIOR TO COLONOSCOPY 1/5/22   Antoine Houser MD   polyethylene glycol (GLYCOLAX) 17 GM/SCOOP powder Use as directed by following your patient instructions given by office. 1/5/22   Antoine Houser MD   metoclopramide (REGLAN) 5 MG tablet Take 1 tablet by mouth 4 times daily 1/4/22   Antoine Houser MD   polyethylene glycol (GLYCOLAX) 17 g packet Take 17 g by mouth daily as needed for Constipation 12/19/21   Jadene Boast, MD   QUEtiapine (SEROQUEL) 100 MG tablet Take 1 tablet by mouth nightly for 3 days 12/19/21 12/22/21  Jadene Boast, MD   QUEtiapine (SEROQUEL) 25 MG tablet Take 1 tablet by mouth every morning (before breakfast) for 3 days 12/19/21 12/22/21  Jadene Boast, MD   traZODone (DESYREL) 100 MG tablet Take 1 tablet by mouth nightly for 3 days 12/19/21 12/22/21  Jadene Boast, MD   gabapentin (NEURONTIN) 300 MG capsule Take 1 capsule by mouth 3 times daily for 3 days. 12/19/21 1/28/22  Jadene Boast, MD   escitalopram (LEXAPRO) 10 MG tablet Take 10 mg by mouth daily    Historical Provider, MD   Folic Acid-Cholecalciferol 1-2000 MG-UNIT TABS Take by mouth    Historical Provider, MD   busPIRone (BUSPAR) 10 MG tablet Take 1 tablet by mouth 2 times daily 11/30/21   Isaiah Frost MD   pantoprazole (PROTONIX) 40 MG tablet Take 1 tablet by mouth 2 times daily 11/25/21 1/28/22  Jadene Boast, MD   traZODone (DESYREL) 100 MG tablet Take 1 tablet by mouth nightly for 7 days 11/25/21 12/18/21  Jadene Boast, MD   bisacodyl (DULCOLAX) 10 MG suppository Place 10 mg rectally daily as needed for Constipation    Historical Provider, MD   Sodium Phosphates (FLEET) 7-19 GM/118ML Place 1 enema rectally As needed after no BM for 5 days    Historical Provider, MD   clotrimazole-betamethasone (LOTRISONE) 1-0.05 % cream Apply topically 2 times daily Apply topically 2 times daily. Historical Provider, MD   budesonide-formoterol (SYMBICORT) 160-4.5 MCG/ACT AERO Inhale 2 puffs into the lungs 2 times daily    Historical Provider, MD   ferrous sulfate (FE TABS 325) 325 (65 Fe) MG EC tablet Take 1 tablet by mouth daily (with breakfast) 12/16/20   MARGOTH Aguirre NP   amitriptyline (ELAVIL) 25 MG tablet Take 1 tablet by mouth nightly 7/14/20   Nikki Menendez MD   metoprolol succinate (TOPROL XL) 25 MG extended release tablet Take 1 tablet by mouth daily 5/30/20   Abdiel Cerda MD   sucralfate (CARAFATE) 1 GM tablet Take 1 tablet by mouth 4 times daily 4/6/20   Angel Terrazas MD   Oyster Shell 500 MG TABS Take 500 mg by mouth 2 times daily     Historical Provider, MD   calcium carbonate (TUMS) 500 MG chewable tablet Take 1 tablet by mouth 3 times daily as needed for Heartburn    Historical Provider, MD   simvastatin (ZOCOR) 40 MG tablet Take 40 mg by mouth nightly. Historical Provider, MD       REVIEW OFSYSTEMS    (2-9 systems for level 4, 10 or more for level 5)      Review of Systems   Constitutional: Positive for fever. Negative for diaphoresis. HENT: Negative for congestion. Eyes: Negative for visual disturbance. Respiratory: Negative for shortness of breath. Cardiovascular: Negative for chest pain. Gastrointestinal: Positive for abdominal pain. Negative for nausea and vomiting. Endocrine: Negative for polyuria. Genitourinary: Positive for difficulty urinating. Negative for dysuria. Musculoskeletal: Negative for back pain. Skin: Negative for wound. Neurological: Negative for headaches. Psychiatric/Behavioral: Negative for confusion.        PHYSICAL EXAM   (up to 7 for level 4, 8 or more forlevel 5)      ED TRIAGE VITALS BP: (!) 116/59, Temp: 99.9 °F (37.7 °C), Pulse: 80, Resp: 13, SpO2: 93 %    Vitals:    05/06/22 1315 05/06/22 1330 05/06/22 1338 05/06/22 1345   BP: 105/87 125/72  138/65   Pulse: 74 72  69   Resp: 14 11  18   Temp:   99.7 °F (37.6 °C) TempSrc:   Oral    SpO2: 96% 95%  96%       Physical Exam  Constitutional:       General: She is not in acute distress. Appearance: She is well-developed. HENT:      Head: Normocephalic and atraumatic. Nose: Nose normal.   Eyes:      Pupils: Pupils are equal, round, and reactive to light. Cardiovascular:      Rate and Rhythm: Normal rate and regular rhythm. Heart sounds: No murmur heard. Pulmonary:      Effort: Pulmonary effort is normal. No respiratory distress. Breath sounds: No stridor. Rhonchi (mild) present. No wheezing. Abdominal:      General: There is no distension. Palpations: Abdomen is soft. Tenderness: There is abdominal tenderness (LLQ). Musculoskeletal:         General: No tenderness. Normal range of motion. Cervical back: Normal range of motion and neck supple. Skin:     General: Skin is warm and dry. Capillary Refill: Capillary refill takes less than 2 seconds. Findings: No erythema or rash. Neurological:      Mental Status: She is alert and oriented to person, place, and time. Sensory: No sensory deficit.       Deep Tendon Reflexes: Reflexes normal.   Psychiatric:         Behavior: Behavior normal.         DIFFERENTIAL  DIAGNOSIS     PLAN (LABS / IMAGING / EKG):  Orders Placed This Encounter   Procedures    Culture, Urine    RAPID INFLUENZA A/B ANTIGENS    Culture, Blood 2    Culture, Blood 1    SARS-CoV-2 NAAT (Rapid)    CT ABDOMEN PELVIS W IV CONTRAST Additional Contrast? None    XR CHEST PORTABLE    CBC with Auto Differential    Comprehensive Metabolic Panel w/ Reflex to MG    Lipase    Troponin    Brain Natriuretic Peptide    Urinalysis with Microscopic    Lactate, Sepsis    Protime-INR    Telemetry monitoring - 24 hour duration    Inpatient consult to Hospitalist    Respiratory care evaluation only    Acapella    EKG 12 Lead    Saline lock IV    Insert peripheral IV    Place in Observation Service MEDICATIONS ORDERED:  Orders Placed This Encounter   Medications    0.9 % sodium chloride bolus    iopamidol (ISOVUE-370) 76 % injection 75 mL    famotidine (PEPCID) 20 mg in sodium chloride (PF) 10 mL injection    LORazepam (ATIVAN) injection 0.5 mg    meropenem (MERREM) 1,000 mg in sodium chloride 0.9 % 100 mL IVPB (mini-bag)     Order Specific Question:   Antimicrobial Indications     Answer:   Urinary Tract Infection    clonazePAM (KLONOPIN) tablet 0.5 mg       DDX:     Pneumonia, influenza, urinary tract infection, viral infection, bacteremia, diverticulitis, myocardial ischemia, COVID-19    Initial MDM/Plan: 68 y.o. female who presents with cough    1 week of cough with intermittent fevers, fever at nursing home of 100.4 given Tylenol 1 hour before arrival, afebrile in the emergency department  Mild rhonchi on lung exam, RT consulted  Rhonchi were not consistently throughout it could be secondary to phlegm  Chest x-ray does not show pneumonia  On reevaluation no rhonchi-we will hold off on steroids  No leukocytosis  Patient does have mild grade fever  Urinalysis significant for many bacteria suspicious for urinary tract infection  Patient has history of ESBL infections, complicated infection will start on meropenem  Patient only has 1 kidney he also has a mild BENEDICT on top of that  Discussed case with inpatient admitting service which think that this is not meet inpatient criteria  Plan for admission to ETU unit for infectious disease assessment and urine culture  Will also need a creatinine recheck  Nephrology if needed  From a cardiac standpoint patient troponin below baseline, proBNP is not elevated, chest x-ray does not show any vascular congestion  For the BENEDICT and urinary tract infection started on merrum and IV fluids    Disposition:  Admitted to ETU unit for infectious disease evaluation and urine cultures speciation    DIAGNOSTIC RESULTS / EMERGENCYDEPARTMENT COURSE / MDM     LABS:  Results for orders placed or performed during the hospital encounter of 05/06/22   RAPID INFLUENZA A/B ANTIGENS    Specimen: Nasopharyngeal   Result Value Ref Range    Flu A Antigen NEGATIVE NEGATIVE    Flu B Antigen NEGATIVE NEGATIVE   Culture, Blood 2    Specimen: Blood   Result Value Ref Range    Specimen Description . BLOOD     Special Requests  LAC 20ML     Culture NO GROWTH <24 HRS    Culture, Blood 1    Specimen: Blood   Result Value Ref Range    Specimen Description . BLOOD     Special Requests RFA 20ML     Culture NO GROWTH <24 HRS    CBC with Auto Differential   Result Value Ref Range    WBC 8.7 3.5 - 11.3 k/uL    RBC 5.03 3.95 - 5.11 m/uL    Hemoglobin 14.2 11.9 - 15.1 g/dL    Hematocrit 45.0 36.3 - 47.1 %    MCV 89.5 82.6 - 102.9 fL    MCH 28.2 25.2 - 33.5 pg    MCHC 31.6 28.4 - 34.8 g/dL    RDW 15.5 (H) 11.8 - 14.4 %    Platelets 347 053 - 129 k/uL    MPV 11.1 8.1 - 13.5 fL    NRBC Automated 0.0 0.0 per 100 WBC    Immature Granulocytes 1 (H) 0 %    Seg Neutrophils 82 (H) 36 - 65 %    Lymphocytes 7 (L) 24 - 43 %    Monocytes 7 3 - 12 %    Eosinophils % 3 1 - 4 %    Basophils 0 0 - 2 %    Absolute Immature Granulocyte 0.09 0.00 - 0.30 k/uL    Segs Absolute 7.13 1.50 - 8.10 k/uL    Absolute Lymph # 0.61 (L) 1.10 - 3.70 k/uL    Absolute Mono # 0.61 0.10 - 1.20 k/uL    Absolute Eos # 0.26 0.00 - 0.44 k/uL    Basophils Absolute 0.00 0.00 - 0.20 k/uL    Morphology ANISOCYTOSIS PRESENT    Comprehensive Metabolic Panel w/ Reflex to MG   Result Value Ref Range    Glucose 145 (H) 70 - 99 mg/dL    BUN 16 8 - 23 mg/dL    CREATININE 1.13 (H) 0.50 - 0.90 mg/dL    Calcium 9.1 8.6 - 10.4 mg/dL    Sodium 136 135 - 144 mmol/L    Potassium 4.0 3.7 - 5.3 mmol/L    Chloride 101 98 - 107 mmol/L    CO2 23 20 - 31 mmol/L    Anion Gap 12 9 - 17 mmol/L    Alkaline Phosphatase 118 (H) 35 - 104 U/L    ALT 21 5 - 33 U/L    AST 24 <32 U/L    Total Bilirubin 0.30 0.3 - 1.2 mg/dL    Total Protein 6.4 6.4 - 8.3 g/dL    Albumin 3.9 3.5 - 5.2 g/dL Albumin/Globulin Ratio 1.6 1.0 - 2.5    GFR Non- 47 (L) >60 mL/min    GFR  57 (L) >60 mL/min    GFR Comment         Lipase   Result Value Ref Range    Lipase 17 13 - 60 U/L   Troponin   Result Value Ref Range    Troponin, High Sensitivity 19 (H) 0 - 14 ng/L   Brain Natriuretic Peptide   Result Value Ref Range    Pro- <300 pg/mL   Urinalysis with Microscopic   Result Value Ref Range    Color, UA Yellow Yellow    Turbidity UA Clear Clear    Glucose, Ur NEGATIVE NEGATIVE    Bilirubin Urine NEGATIVE NEGATIVE    Ketones, Urine NEGATIVE NEGATIVE    Specific Gravity, UA 1.015 1.005 - 1.030    Urine Hgb NEGATIVE NEGATIVE    pH, UA 7.0 5.0 - 8.0    Protein, UA NEGATIVE NEGATIVE    Urobilinogen, Urine Normal Normal    Nitrite, Urine NEGATIVE NEGATIVE    Leukocyte Esterase, Urine SMALL (A) NEGATIVE    -          WBC, UA 2 TO 5 0 - 5 /HPF    RBC, UA 20 TO 50 0 - 4 /HPF    Epithelial Cells UA None 0 - 5 /HPF    Bacteria, UA MANY (A) None   Lactate, Sepsis   Result Value Ref Range    Lactic Acid, Sepsis, Whole Blood 1.8 0.5 - 1.9 mmol/L   Protime-INR   Result Value Ref Range    Protime 10.9 9.1 - 12.3 sec    INR 1.0        RADIOLOGY:  CT ABDOMEN PELVIS W IV CONTRAST Additional Contrast? None   Preliminary Result   No acute process in the abdomen or pelvis. XR CHEST PORTABLE   Final Result   No acute cardiopulmonary disease             EKG  No ST segment elevation or depression seen on EKG    All EKG's are interpreted by the Emergency Department Physicianwho either signs or Co-signs this chart in the absence of a cardiologist.    EMERGENCY DEPARTMENT COURSE:  ED Course as of 05/06/22 1514   Fri May 06, 2022   1010 Patient seen and assessed in emergency department no acute respiratory cardiovascular distress. Patient here with 1 week of cough and progressively worsening symptoms, productive cough with yellow sputum has history of pneumonia.   Is currently was in a nursing home, was tested for COVID earlier today was negative. Is not on oxygen at home satting 93%, states she does not feel short of breath or hypoxic or she is having worsening dyspnea. States that she does feel like the cough is getting worse,Lower abdomen, has a baseline abdominal pain, states she is he always has abdominal pain. Denies chest pain, headache vision changes or focal deficits. States that she has had a fever today was given Tylenol 1 hour before presentation to the emergency department. States that she had a fever of 103 at nursing home. States she feels generalized weakness all over. Denies recent falls, dysuria though she is incontinent at baseline. [PS]   1012 Pulse: 80 [PS]   1012 SpO2: 93 % [PS]   1012 Temp: 99.9 °F (37.7 °C) [PS]   1107 Flu B Antigen: NEGATIVE [PS]   1134 WBC: 8.7 [PS]   1134 Lipase: 17 [PS]   1134 Pro-BNP: 113 [PS]   1134 Troponin, High Sensitivity(!): 19  Below baseline   [PS]   1134 Creatinine(!): 1.13  IV fluids [PS]   1203 Pulse: 79 [PS]   1203 BP: 120/64 [PS]   1210 XR CHEST PORTABLE  Neg pmn  Covid negative at outlying facility . ..nursing home    [PS]   1223 Temp: 100.2 °F (37.9 °C) [PS]   1328 Bacteria, UA(!): MANY [PS]   1345 Temp: 99.7 °F (37.6 °C) [PS]   1345 D/w intermed, feel that she does not have a UTI and does not need inpatient admission [PS]   1426 Lactic Acid, Sepsis, Whole Blood: 1.8 [PS]      ED Course User Index  [PS] Segundo Koenig MD          PROCEDURES:  None    CONSULTS:  IP CONSULT TO HOSPITALIST  IP CONSULT TO INFECTIOUS DISEASES    CRITICAL CARE:  Please see attending note    FINAL IMPRESSION      1. Acute cystitis with hematuria          DISPOSITION / PLAN     DISPOSITION Admitted 05/06/2022 02:59:02 PM       PATIENT REFERRED TO:  No follow-up provider specified.     DISCHARGE MEDICATIONS:  New Prescriptions    No medications on file       Segundo Koenig MD  Emergency Medicine Resident    (Please note that portions of this note were completed with a voice recognition program.Efforts were made to edit the dictations but occasionally words are mis-transcribed.)       Nohemy Hawkins MD  Resident  05/06/22 0384

## 2022-05-06 NOTE — ED NOTES
Report to [address] [place] [address] on 3C, all questions addressed      Philly Espana RN  05/06/22 6010

## 2022-05-06 NOTE — ED NOTES
Pt resting on stretcher, attached to monitor, call light in reach, states no other needs at this time     Nicholas Pacheco, JUAREZ  05/06/22 6096

## 2022-05-06 NOTE — PROGRESS NOTES
Patient is a resident at [facility] in [facility], [address]. Patient reports she  Takes numerous night time medications but does not recall all the meds or the doses. RN contacted Natalie Pineda from [place] and she will be faxing patient med list. Night shift was advised.

## 2022-05-06 NOTE — ED NOTES
Patient states that she woke up a fever. Patient states that she has had a cough for a couple weeks now.       Dannie Hamilton RN  05/06/22 0247

## 2022-05-06 NOTE — ED NOTES
The following labs labeled with pt sticker and tubed to lab:     [] Blue     [] Lavender   [] on ice  [] Green/yellow  [] Green/black [] on ice  [] Yellow  [] Red  [] Pink      [] COVID-19 swab    [] Rapid  [] PCR  [] Flu swab  [] Peds Viral Panel     [x] Urine Sample  [] Pelvic Cultures  [] Blood Cultures            Tam Aguirre RN  05/06/22 9558

## 2022-05-06 NOTE — PROGRESS NOTES
Assessment: Patient was a referral from nurse. Patient looked anxious and worried. When asked how she was feeling, patient responded; \"not good. \" Patient said she was raised [facility]. Patient declined prayer but was very receptive to pastoral presence. Family was not present at the time. Patient said family new she came to [facility]. V's. Intervention:  maintained listening presence, offered support and reassured patient that she was in good hands. Outcome: Patient expressed appreciation for the emotional support she received. Plan: Follow up visits recommended for more emotional support.

## 2022-05-06 NOTE — ED NOTES
The following labs labeled with pt sticker and tubed to lab:     [] Blue     [] Lavender   [] on ice  [] Green/yellow  [x] Green/black [] on ice  [] Yellow  [] Red  [] Pink      [] COVID-19 swab    [] Rapid  [] PCR  [] Flu swab  [] Peds Viral Panel     [] Urine Sample  [] Pelvic Cultures  [] Blood Cultures            Shahnaz Pacheco RN  05/06/22 4947

## 2022-05-06 NOTE — ED NOTES
The following labs labeled with pt sticker and tubed to lab:     [x] Blue     [x] Lavender   [] on ice  [x] Green/yellow  [x] Green/black [] on ice  [x] Yellow  [] Red  [] Pink      [] COVID-19 swab    [] Rapid  [] PCR  [x] Flu swab  [] Peds Viral Panel     [] Urine Sample  [] Pelvic Cultures  [] Blood Cultures          Remberto Mcwilliams RN  05/06/22 8820

## 2022-05-06 NOTE — ED NOTES
The following labs labeled with pt sticker and tubed to lab:     [] Blue     [] Lavender   [] on ice  [] Green/yellow  [x] Green/black [x] on ice  [] Yellow  [] Red  [] Pink      [] COVID-19 swab    [] Rapid  [] PCR  [] Flu swab  [] Peds Viral Panel     [] Urine Sample  [] Pelvic Cultures  [x] Blood Cultures x2        Zaynab Guerra, RN  05/06/22 2695

## 2022-05-07 LAB
EKG ATRIAL RATE: 79 BPM
EKG P AXIS: 35 DEGREES
EKG P-R INTERVAL: 148 MS
EKG Q-T INTERVAL: 382 MS
EKG QRS DURATION: 78 MS
EKG QTC CALCULATION (BAZETT): 438 MS
EKG R AXIS: 39 DEGREES
EKG T AXIS: 26 DEGREES
EKG VENTRICULAR RATE: 79 BPM

## 2022-05-07 PROCEDURE — 6370000000 HC RX 637 (ALT 250 FOR IP): Performed by: STUDENT IN AN ORGANIZED HEALTH CARE EDUCATION/TRAINING PROGRAM

## 2022-05-07 PROCEDURE — 6360000002 HC RX W HCPCS: Performed by: EMERGENCY MEDICINE

## 2022-05-07 PROCEDURE — G0378 HOSPITAL OBSERVATION PER HR: HCPCS

## 2022-05-07 PROCEDURE — 96376 TX/PRO/DX INJ SAME DRUG ADON: CPT

## 2022-05-07 PROCEDURE — 2580000003 HC RX 258: Performed by: STUDENT IN AN ORGANIZED HEALTH CARE EDUCATION/TRAINING PROGRAM

## 2022-05-07 PROCEDURE — 6360000002 HC RX W HCPCS: Performed by: STUDENT IN AN ORGANIZED HEALTH CARE EDUCATION/TRAINING PROGRAM

## 2022-05-07 PROCEDURE — 6370000000 HC RX 637 (ALT 250 FOR IP): Performed by: EMERGENCY MEDICINE

## 2022-05-07 PROCEDURE — 96372 THER/PROPH/DIAG INJ SC/IM: CPT

## 2022-05-07 PROCEDURE — 93010 ELECTROCARDIOGRAM REPORT: CPT | Performed by: INTERNAL MEDICINE

## 2022-05-07 PROCEDURE — 96375 TX/PRO/DX INJ NEW DRUG ADDON: CPT

## 2022-05-07 RX ORDER — POLYETHYLENE GLYCOL 3350 17 G/17G
17 POWDER, FOR SOLUTION ORAL DAILY PRN
Status: DISCONTINUED | OUTPATIENT
Start: 2022-05-07 | End: 2022-05-10 | Stop reason: HOSPADM

## 2022-05-07 RX ORDER — MORPHINE SULFATE 2 MG/ML
2 INJECTION, SOLUTION INTRAMUSCULAR; INTRAVENOUS EVERY 4 HOURS PRN
Status: DISCONTINUED | OUTPATIENT
Start: 2022-05-07 | End: 2022-05-10 | Stop reason: HOSPADM

## 2022-05-07 RX ORDER — QUETIAPINE FUMARATE 100 MG/1
100 TABLET, FILM COATED ORAL NIGHTLY
Status: DISCONTINUED | OUTPATIENT
Start: 2022-05-07 | End: 2022-05-10 | Stop reason: HOSPADM

## 2022-05-07 RX ORDER — BUDESONIDE AND FORMOTEROL FUMARATE DIHYDRATE 80; 4.5 UG/1; UG/1
2 AEROSOL RESPIRATORY (INHALATION) 3 TIMES DAILY PRN
Status: DISCONTINUED | OUTPATIENT
Start: 2022-05-07 | End: 2022-05-10 | Stop reason: HOSPADM

## 2022-05-07 RX ORDER — CALCIUM CARBONATE 200(500)MG
500 TABLET,CHEWABLE ORAL 3 TIMES DAILY PRN
Status: DISCONTINUED | OUTPATIENT
Start: 2022-05-07 | End: 2022-05-10 | Stop reason: HOSPADM

## 2022-05-07 RX ORDER — CALCIUM CARBONATE-CHOLECALCIFEROL TAB 250 MG-125 UNIT 250-125 MG-UNIT
2 TAB ORAL 2 TIMES DAILY
Status: DISCONTINUED | OUTPATIENT
Start: 2022-05-07 | End: 2022-05-10 | Stop reason: HOSPADM

## 2022-05-07 RX ORDER — LANOLIN ALCOHOL/MO/W.PET/CERES
325 CREAM (GRAM) TOPICAL
Status: DISCONTINUED | OUTPATIENT
Start: 2022-05-08 | End: 2022-05-10 | Stop reason: HOSPADM

## 2022-05-07 RX ORDER — ATORVASTATIN CALCIUM 40 MG/1
40 TABLET, FILM COATED ORAL DAILY
Status: DISCONTINUED | OUTPATIENT
Start: 2022-05-07 | End: 2022-05-10 | Stop reason: HOSPADM

## 2022-05-07 RX ORDER — POLYETHYLENE GLYCOL 3350 17 G/17G
17 POWDER, FOR SOLUTION ORAL DAILY
Status: DISCONTINUED | OUTPATIENT
Start: 2022-05-07 | End: 2022-05-10 | Stop reason: HOSPADM

## 2022-05-07 RX ORDER — METOPROLOL SUCCINATE 25 MG/1
25 TABLET, EXTENDED RELEASE ORAL DAILY
Status: DISCONTINUED | OUTPATIENT
Start: 2022-05-07 | End: 2022-05-10 | Stop reason: HOSPADM

## 2022-05-07 RX ORDER — GUAIFENESIN DEXTROMETHORPHAN HYDROBROMIDE ORAL SOLUTION 10; 100 MG/5ML; MG/5ML
10 SOLUTION ORAL EVERY 6 HOURS PRN
Status: DISCONTINUED | OUTPATIENT
Start: 2022-05-07 | End: 2022-05-10 | Stop reason: HOSPADM

## 2022-05-07 RX ORDER — GABAPENTIN 600 MG/1
300 TABLET ORAL 3 TIMES DAILY
Status: DISCONTINUED | OUTPATIENT
Start: 2022-05-07 | End: 2022-05-10 | Stop reason: HOSPADM

## 2022-05-07 RX ORDER — FOLIC ACID 1 MG/1
1 TABLET ORAL DAILY
Status: DISCONTINUED | OUTPATIENT
Start: 2022-05-07 | End: 2022-05-10 | Stop reason: HOSPADM

## 2022-05-07 RX ORDER — FLUTICASONE PROPIONATE 50 MCG
1 SPRAY, SUSPENSION (ML) NASAL DAILY
Status: DISCONTINUED | OUTPATIENT
Start: 2022-05-07 | End: 2022-05-10 | Stop reason: HOSPADM

## 2022-05-07 RX ORDER — LACTOBACILLUS RHAMNOSUS GG 10B CELL
1 CAPSULE ORAL
Status: DISCONTINUED | OUTPATIENT
Start: 2022-05-08 | End: 2022-05-10 | Stop reason: HOSPADM

## 2022-05-07 RX ORDER — UREA 10 %
1 LOTION (ML) TOPICAL 2 TIMES DAILY
COMMUNITY

## 2022-05-07 RX ORDER — QUETIAPINE FUMARATE 25 MG/1
25 TABLET, FILM COATED ORAL DAILY
Status: DISCONTINUED | OUTPATIENT
Start: 2022-05-07 | End: 2022-05-10 | Stop reason: HOSPADM

## 2022-05-07 RX ORDER — GUAIFENESIN 600 MG/1
400 TABLET, EXTENDED RELEASE ORAL DAILY
COMMUNITY

## 2022-05-07 RX ORDER — LORAZEPAM 0.5 MG/1
0.5 TABLET ORAL EVERY 6 HOURS PRN
Status: DISCONTINUED | OUTPATIENT
Start: 2022-05-07 | End: 2022-05-10 | Stop reason: HOSPADM

## 2022-05-07 RX ORDER — SUCRALFATE 1 G/1
1 TABLET ORAL EVERY 6 HOURS SCHEDULED
Status: DISCONTINUED | OUTPATIENT
Start: 2022-05-07 | End: 2022-05-10 | Stop reason: HOSPADM

## 2022-05-07 RX ADMIN — METOPROLOL SUCCINATE 25 MG: 25 TABLET, FILM COATED, EXTENDED RELEASE ORAL at 11:58

## 2022-05-07 RX ADMIN — BUSPIRONE HYDROCHLORIDE 10 MG: 10 TABLET ORAL at 09:22

## 2022-05-07 RX ADMIN — CALCIUM CARBONATE-CHOLECALCIFEROL TAB 250 MG-125 UNIT 2 TABLET: 250-125 TAB at 11:58

## 2022-05-07 RX ADMIN — BUSPIRONE HYDROCHLORIDE 10 MG: 10 TABLET ORAL at 20:08

## 2022-05-07 RX ADMIN — AMITRIPTYLINE HYDROCHLORIDE 25 MG: 25 TABLET, FILM COATED ORAL at 20:07

## 2022-05-07 RX ADMIN — FLUTICASONE PROPIONATE 1 SPRAY: 50 SPRAY, METERED NASAL at 11:06

## 2022-05-07 RX ADMIN — PANTOPRAZOLE SODIUM 40 MG: 40 TABLET, DELAYED RELEASE ORAL at 01:32

## 2022-05-07 RX ADMIN — ENOXAPARIN SODIUM 40 MG: 100 INJECTION SUBCUTANEOUS at 09:23

## 2022-05-07 RX ADMIN — SUCRALFATE 1 G: 1 TABLET ORAL at 17:56

## 2022-05-07 RX ADMIN — ACETAMINOPHEN 650 MG: 325 TABLET ORAL at 01:31

## 2022-05-07 RX ADMIN — PANTOPRAZOLE SODIUM 40 MG: 40 TABLET, DELAYED RELEASE ORAL at 09:22

## 2022-05-07 RX ADMIN — METOCLOPRAMIDE 5 MG: 5 TABLET ORAL at 17:56

## 2022-05-07 RX ADMIN — METOCLOPRAMIDE 5 MG: 5 TABLET ORAL at 01:32

## 2022-05-07 RX ADMIN — QUETIAPINE FUMARATE 100 MG: 100 TABLET ORAL at 20:07

## 2022-05-07 RX ADMIN — ACETAMINOPHEN 650 MG: 325 TABLET ORAL at 11:03

## 2022-05-07 RX ADMIN — GUAIFENESIN DEXTROMETHORPHAN HYDROBROMIDE ORAL SOLUTION 10 ML: 200; 20 SOLUTION ORAL at 11:06

## 2022-05-07 RX ADMIN — MORPHINE SULFATE 2 MG: 2 INJECTION, SOLUTION INTRAMUSCULAR; INTRAVENOUS at 11:58

## 2022-05-07 RX ADMIN — METOCLOPRAMIDE 5 MG: 5 TABLET ORAL at 20:07

## 2022-05-07 RX ADMIN — ACETAMINOPHEN 650 MG: 325 TABLET ORAL at 17:59

## 2022-05-07 RX ADMIN — ENOXAPARIN SODIUM 40 MG: 100 INJECTION SUBCUTANEOUS at 01:31

## 2022-05-07 RX ADMIN — ONDANSETRON 4 MG: 2 INJECTION INTRAMUSCULAR; INTRAVENOUS at 01:28

## 2022-05-07 RX ADMIN — METOCLOPRAMIDE 5 MG: 5 TABLET ORAL at 09:22

## 2022-05-07 RX ADMIN — FOLIC ACID 1 MG: 1 TABLET ORAL at 11:58

## 2022-05-07 RX ADMIN — GABAPENTIN 300 MG: 600 TABLET ORAL at 14:08

## 2022-05-07 RX ADMIN — SODIUM CHLORIDE, PRESERVATIVE FREE 10 ML: 5 INJECTION INTRAVENOUS at 20:17

## 2022-05-07 RX ADMIN — PANTOPRAZOLE SODIUM 40 MG: 40 TABLET, DELAYED RELEASE ORAL at 20:07

## 2022-05-07 RX ADMIN — METOCLOPRAMIDE 5 MG: 5 TABLET ORAL at 11:58

## 2022-05-07 RX ADMIN — ACETAMINOPHEN 650 MG: 325 TABLET ORAL at 22:31

## 2022-05-07 RX ADMIN — LORAZEPAM 1 MG: 1 TABLET ORAL at 01:32

## 2022-05-07 RX ADMIN — TRAZODONE HYDROCHLORIDE 100 MG: 100 TABLET ORAL at 20:08

## 2022-05-07 RX ADMIN — CALCIUM CARBONATE-CHOLECALCIFEROL TAB 250 MG-125 UNIT 2 TABLET: 250-125 TAB at 20:08

## 2022-05-07 RX ADMIN — AMITRIPTYLINE HYDROCHLORIDE 25 MG: 25 TABLET, FILM COATED ORAL at 01:31

## 2022-05-07 RX ADMIN — GABAPENTIN 300 MG: 600 TABLET ORAL at 20:07

## 2022-05-07 RX ADMIN — ANTACID TABLETS 500 MG: 500 TABLET, CHEWABLE ORAL at 15:33

## 2022-05-07 RX ADMIN — DESMOPRESSIN ACETATE 40 MG: 0.2 TABLET ORAL at 20:08

## 2022-05-07 RX ADMIN — SODIUM CHLORIDE, PRESERVATIVE FREE 20 ML: 5 INJECTION INTRAVENOUS at 09:19

## 2022-05-07 RX ADMIN — TRAZODONE HYDROCHLORIDE 100 MG: 100 TABLET ORAL at 01:32

## 2022-05-07 RX ADMIN — MORPHINE SULFATE 2 MG: 2 INJECTION, SOLUTION INTRAMUSCULAR; INTRAVENOUS at 20:16

## 2022-05-07 RX ADMIN — ESCITALOPRAM OXALATE 10 MG: 10 TABLET ORAL at 09:22

## 2022-05-07 RX ADMIN — CLONAZEPAM 0.5 MG: 0.5 TABLET ORAL at 11:06

## 2022-05-07 RX ADMIN — ANTACID TABLETS 500 MG: 500 TABLET, CHEWABLE ORAL at 22:29

## 2022-05-07 RX ADMIN — BUSPIRONE HYDROCHLORIDE 10 MG: 10 TABLET ORAL at 01:31

## 2022-05-07 RX ADMIN — SUCRALFATE 1 G: 1 TABLET ORAL at 11:58

## 2022-05-07 RX ADMIN — ESCITALOPRAM OXALATE 10 MG: 10 TABLET ORAL at 01:32

## 2022-05-07 ASSESSMENT — PAIN DESCRIPTION - DESCRIPTORS: DESCRIPTORS: ACHING;SHARP

## 2022-05-07 ASSESSMENT — PAIN SCALES - GENERAL
PAINLEVEL_OUTOF10: 7
PAINLEVEL_OUTOF10: 6
PAINLEVEL_OUTOF10: 6
PAINLEVEL_OUTOF10: 7

## 2022-05-07 ASSESSMENT — PAIN - FUNCTIONAL ASSESSMENT: PAIN_FUNCTIONAL_ASSESSMENT: PREVENTS OR INTERFERES SOME ACTIVE ACTIVITIES AND ADLS

## 2022-05-07 NOTE — PLAN OF CARE
Problem: Skin/Tissue Integrity  Goal: Absence of new skin breakdown  Description: 1. Monitor for areas of redness and/or skin breakdown  2. Assess vascular access sites hourly  3. Every 4-6 hours minimum:  Change oxygen saturation probe site  4. Every 4-6 hours:  If on nasal continuous positive airway pressure, respiratory therapy assess nares and determine need for appliance change or resting period.   5/7/2022 0248 by Trinity Martin RN  Outcome: Progressing  5/6/2022 1827 by Yara Keen RN  Outcome: Progressing     Problem: Safety - Adult  Goal: Free from fall injury  5/7/2022 0248 by Trinity Martin RN  Outcome: Progressing  5/6/2022 1827 by Yara Keen, RN  Outcome: Progressing     Problem: Pain  Goal: Verbalizes/displays adequate comfort level or baseline comfort level  Outcome: Progressing

## 2022-05-07 NOTE — PROGRESS NOTES
[address]  CDU / OBSERVATION ENCOUNTER  ATTENDING NOTE       I performed a history and physical examination of the patient and discussed management with the resident or midlevel provider. I reviewed the resident or midlevel provider's note and agree with the documented findings and plan of care. Any areas of disagreement are noted on the chart. I was personally present for the key portions of any procedures. I have documented in the chart those procedures where I was not present during the key portions. I have reviewed the nurses notes. I agree with the chief complaint, past medical history, past surgical history, allergies, medications, social and family history as documented unless otherwise noted below. The Family history, social history, and ROS are effectively unchanged since admission unless noted elsewhere in the chart. Low-grade temp overnight x1. Patient with urine culture growing organism. Patient has been otherwise afebrile. Normal vital signs otherwise. Patient with significant anxiety but objectively looks well. Will await identification of organism.   Appreciate ID recommendations    Corine Srivastava MD  Attending Emergency  Physician

## 2022-05-07 NOTE — PROGRESS NOTES
OBS/CDU   RESIDENT NOTE      Patients PCP is:  Zoraida Schmitz MD        SUBJECTIVE      Patient is 70-year-old female presented to the ER with a fever at home, she was being treated with meropenem for UTI. Patient did well overnight, she was feeling short of breath when she arrived to the ER. Patient is anxious, is worried that she is not can get all of her medications. Patient is otherwise not in any acute distress. Satting well on room air. Vitals hemodynamically stable. PHYSICAL EXAM      General: NAD, AO X 3  Heent: EMOI, PERRL  Neck: SUPPLE, NO JVD  Cardiovascular: RRR, S1S2  Pulmonary: CTAB, NO SOB  Abdomen: SOFT, NTTP, ND, +BS  Extremities: +2/4 PULSES DISTAL, NO SWELLING  Neuro / Psych: NO NUMBNESS OR TINGLING, MENTATION AT BASELINE    PERTINENT TEST /EXAMS      I have reviewed all available laboratory results.     MEDICATIONS CURRENT   polyethylene glycol (GLYCOLAX) packet 17 g, Daily PRN  LORazepam (ATIVAN) tablet 0.5 mg, Q6H PRN  fluticasone (FLONASE) 50 MCG/ACT nasal spray 1 spray, Daily  morphine (PF) injection 2 mg, Q4H PRN  dextromethorphan-guaiFENesin (ROBITUSSIN-DM)  MG/5ML liquid 10 mL, Q6H PRN  benzocaine-menthol (CEPACOL SORE THROAT) lozenge 1 lozenge, 4x Daily PRN  [START ON 5/8/2022] ferrous sulfate (FE TABS 325) EC tablet 325 mg, Daily with breakfast  metoprolol succinate (TOPROL XL) extended release tablet 25 mg, Daily  folic acid (FOLVITE) tablet 1 mg, Daily  QUEtiapine (SEROQUEL) tablet 25 mg, Daily  QUEtiapine (SEROQUEL) tablet 100 mg, Nightly  atorvastatin (LIPITOR) tablet 40 mg, Daily  calcium carb-cholecalciferol 250-125 MG-UNIT per tab 2 tablet, BID  [START ON 5/8/2022] lactobacillus (CULTURELLE) capsule 1 capsule, Daily with breakfast  budesonide-formoterol (SYMBICORT) 80-4.5 MCG/ACT inhaler 2 puff, TID PRN  gabapentin (NEURONTIN) tablet 300 mg, TID  sucralfate (CARAFATE) tablet 1 g, 4 times per day  polyethylene glycol (GLYCOLAX) packet 17 g, Daily  calcium carbonate (TUMS) chewable tablet 500 mg, TID PRN  amitriptyline (ELAVIL) tablet 25 mg, Nightly  escitalopram (LEXAPRO) tablet 10 mg, Daily  clonazePAM (KLONOPIN) tablet 0.5 mg, BID PRN  pantoprazole (PROTONIX) tablet 40 mg, BID  sodium chloride flush 0.9 % injection 5-40 mL, 2 times per day  sodium chloride flush 0.9 % injection 5-40 mL, PRN  0.9 % sodium chloride infusion, PRN  enoxaparin (LOVENOX) injection 40 mg, Daily  acetaminophen (TYLENOL) tablet 650 mg, Q4H PRN  ondansetron (ZOFRAN-ODT) disintegrating tablet 4 mg, Q8H PRN   Or  ondansetron (ZOFRAN) injection 4 mg, Q6H PRN  traZODone (DESYREL) tablet 100 mg, Nightly  busPIRone (BUSPAR) tablet 10 mg, BID  metoclopramide (REGLAN) tablet 5 mg, 4x Daily AC & HS  LORazepam (ATIVAN) tablet 1 mg, Nightly PRN        All medication charted and reviewed. CONSULTS      IP CONSULT TO HOSPITALIST  IP CONSULT TO INFECTIOUS DISEASES    ASSESSMENT/PLAN       Irma File is a 68 y.o. female who presents with febrile illness of unclear etiology. Consider UTI as source however not definite as she has been on meropenem. Patient has been afebrile since this morning with a temperature of 98.2 to 98.4 °F.  Patient is not nauseous, she has not vomited. She is not having any abdominal pain. Patient is not septic. Not tachycardic. Satting well on room air. She has been on meropenem for her urinary tract infection, infectious disease consulted, pending evaluation and recommendations. We will continue the patient on her Klonopin and as needed Ativan for her anxiety. Continue home medications. · Continue home medications and pain control  · Monitor vitals, labs, and imaging  · DISPO: pending consults and clinical improvement    --  Trina Magallon, DO  Emergency Medicine Resident Physician     This dictation was generated by voice recognition computer software.   Although all attempts are made to edit the dictation for accuracy, there may be errors in the transcription that are not intended.

## 2022-05-08 ENCOUNTER — APPOINTMENT (OUTPATIENT)
Dept: GENERAL RADIOLOGY | Age: 77
DRG: 690 | End: 2022-05-08
Payer: MEDICARE

## 2022-05-08 PROBLEM — R10.9 INTRACTABLE ABDOMINAL PAIN: Status: ACTIVE | Noted: 2022-05-08

## 2022-05-08 LAB — LACTIC ACID, WHOLE BLOOD: 1.9 MMOL/L (ref 0.7–2.1)

## 2022-05-08 PROCEDURE — 6370000000 HC RX 637 (ALT 250 FOR IP): Performed by: STUDENT IN AN ORGANIZED HEALTH CARE EDUCATION/TRAINING PROGRAM

## 2022-05-08 PROCEDURE — 6360000002 HC RX W HCPCS: Performed by: STUDENT IN AN ORGANIZED HEALTH CARE EDUCATION/TRAINING PROGRAM

## 2022-05-08 PROCEDURE — 83605 ASSAY OF LACTIC ACID: CPT

## 2022-05-08 PROCEDURE — 6370000000 HC RX 637 (ALT 250 FOR IP): Performed by: EMERGENCY MEDICINE

## 2022-05-08 PROCEDURE — 2580000003 HC RX 258: Performed by: STUDENT IN AN ORGANIZED HEALTH CARE EDUCATION/TRAINING PROGRAM

## 2022-05-08 PROCEDURE — 1200000000 HC SEMI PRIVATE

## 2022-05-08 PROCEDURE — 96366 THER/PROPH/DIAG IV INF ADDON: CPT

## 2022-05-08 PROCEDURE — 74022 RADEX COMPL AQT ABD SERIES: CPT

## 2022-05-08 PROCEDURE — 99222 1ST HOSP IP/OBS MODERATE 55: CPT | Performed by: INTERNAL MEDICINE

## 2022-05-08 PROCEDURE — 96372 THER/PROPH/DIAG INJ SC/IM: CPT

## 2022-05-08 PROCEDURE — 36415 COLL VENOUS BLD VENIPUNCTURE: CPT

## 2022-05-08 RX ORDER — BISACODYL 10 MG
10 SUPPOSITORY, RECTAL RECTAL DAILY PRN
Status: DISCONTINUED | OUTPATIENT
Start: 2022-05-08 | End: 2022-05-10 | Stop reason: HOSPADM

## 2022-05-08 RX ADMIN — METOPROLOL SUCCINATE 25 MG: 25 TABLET, FILM COATED, EXTENDED RELEASE ORAL at 09:43

## 2022-05-08 RX ADMIN — CALCIUM CARBONATE-CHOLECALCIFEROL TAB 250 MG-125 UNIT 2 TABLET: 250-125 TAB at 20:45

## 2022-05-08 RX ADMIN — MEROPENEM 1000 MG: 1 INJECTION, POWDER, FOR SOLUTION INTRAVENOUS at 23:12

## 2022-05-08 RX ADMIN — METOCLOPRAMIDE 5 MG: 5 TABLET ORAL at 18:39

## 2022-05-08 RX ADMIN — MEROPENEM 1000 MG: 1 INJECTION, POWDER, FOR SOLUTION INTRAVENOUS at 12:45

## 2022-05-08 RX ADMIN — SODIUM CHLORIDE, PRESERVATIVE FREE 10 ML: 5 INJECTION INTRAVENOUS at 09:36

## 2022-05-08 RX ADMIN — METOCLOPRAMIDE 5 MG: 5 TABLET ORAL at 20:45

## 2022-05-08 RX ADMIN — GABAPENTIN 300 MG: 600 TABLET ORAL at 15:33

## 2022-05-08 RX ADMIN — TRAZODONE HYDROCHLORIDE 100 MG: 100 TABLET ORAL at 20:45

## 2022-05-08 RX ADMIN — QUETIAPINE FUMARATE 100 MG: 100 TABLET ORAL at 20:45

## 2022-05-08 RX ADMIN — ACETAMINOPHEN 650 MG: 325 TABLET ORAL at 18:39

## 2022-05-08 RX ADMIN — PANTOPRAZOLE SODIUM 40 MG: 40 TABLET, DELAYED RELEASE ORAL at 09:39

## 2022-05-08 RX ADMIN — QUETIAPINE FUMARATE 25 MG: 25 TABLET ORAL at 09:42

## 2022-05-08 RX ADMIN — CALCIUM CARBONATE-CHOLECALCIFEROL TAB 250 MG-125 UNIT 2 TABLET: 250-125 TAB at 09:43

## 2022-05-08 RX ADMIN — ANTACID TABLETS 500 MG: 500 TABLET, CHEWABLE ORAL at 18:39

## 2022-05-08 RX ADMIN — ONDANSETRON 4 MG: 2 INJECTION INTRAMUSCULAR; INTRAVENOUS at 20:54

## 2022-05-08 RX ADMIN — POLYETHYLENE GLYCOL 3350 17 G: 17 POWDER, FOR SOLUTION ORAL at 12:42

## 2022-05-08 RX ADMIN — BUSPIRONE HYDROCHLORIDE 10 MG: 10 TABLET ORAL at 20:45

## 2022-05-08 RX ADMIN — GUAIFENESIN DEXTROMETHORPHAN HYDROBROMIDE ORAL SOLUTION 10 ML: 200; 20 SOLUTION ORAL at 12:39

## 2022-05-08 RX ADMIN — SUCRALFATE 1 G: 1 TABLET ORAL at 23:12

## 2022-05-08 RX ADMIN — FERROUS SULFATE TAB EC 325 MG (65 MG FE EQUIVALENT) 325 MG: 325 (65 FE) TABLET DELAYED RESPONSE at 09:40

## 2022-05-08 RX ADMIN — FLUTICASONE PROPIONATE 1 SPRAY: 50 SPRAY, METERED NASAL at 09:46

## 2022-05-08 RX ADMIN — GABAPENTIN 300 MG: 600 TABLET ORAL at 20:45

## 2022-05-08 RX ADMIN — CLONAZEPAM 0.5 MG: 0.5 TABLET ORAL at 20:45

## 2022-05-08 RX ADMIN — FOLIC ACID 1 MG: 1 TABLET ORAL at 09:44

## 2022-05-08 RX ADMIN — SUCRALFATE 1 G: 1 TABLET ORAL at 12:35

## 2022-05-08 RX ADMIN — Medication 1 CAPSULE: at 09:44

## 2022-05-08 RX ADMIN — BUSPIRONE HYDROCHLORIDE 10 MG: 10 TABLET ORAL at 09:39

## 2022-05-08 RX ADMIN — SUCRALFATE 1 G: 1 TABLET ORAL at 09:44

## 2022-05-08 RX ADMIN — DESMOPRESSIN ACETATE 40 MG: 0.2 TABLET ORAL at 09:39

## 2022-05-08 RX ADMIN — GABAPENTIN 300 MG: 600 TABLET ORAL at 09:38

## 2022-05-08 RX ADMIN — ENOXAPARIN SODIUM 40 MG: 100 INJECTION SUBCUTANEOUS at 09:46

## 2022-05-08 RX ADMIN — METOCLOPRAMIDE 5 MG: 5 TABLET ORAL at 09:41

## 2022-05-08 RX ADMIN — ESCITALOPRAM OXALATE 10 MG: 10 TABLET ORAL at 09:41

## 2022-05-08 RX ADMIN — AMITRIPTYLINE HYDROCHLORIDE 25 MG: 25 TABLET, FILM COATED ORAL at 20:45

## 2022-05-08 RX ADMIN — PANTOPRAZOLE SODIUM 40 MG: 40 TABLET, DELAYED RELEASE ORAL at 20:45

## 2022-05-08 RX ADMIN — ONDANSETRON 4 MG: 4 TABLET, ORALLY DISINTEGRATING ORAL at 12:35

## 2022-05-08 RX ADMIN — SUCRALFATE 1 G: 1 TABLET ORAL at 18:39

## 2022-05-08 RX ADMIN — METOCLOPRAMIDE 5 MG: 5 TABLET ORAL at 12:35

## 2022-05-08 RX ADMIN — CLONAZEPAM 0.5 MG: 0.5 TABLET ORAL at 09:38

## 2022-05-08 ASSESSMENT — ENCOUNTER SYMPTOMS
APNEA: 0
EYE DISCHARGE: 0
CONSTIPATION: 1
COLOR CHANGE: 0
ABDOMINAL PAIN: 1

## 2022-05-08 ASSESSMENT — PAIN SCALES - GENERAL
PAINLEVEL_OUTOF10: 6
PAINLEVEL_OUTOF10: 7

## 2022-05-08 NOTE — PROGRESS NOTES
OBS/CDU   RESIDENT NOTE      Patients PCP is:  Paula Chaidez MD        SUBJECTIVE      Patient is 19-year-old female presented to the ER with a fever at home, patient is being treated with meropenem for UTI. Continue on meropenem 1 g every 12 hours today for CrCl less than 50. Patient did well overnight, she was feeling short of breath when she arrived to the ER. Patient continues to have generalized abdominal pain however is tolerating p.o. intake. Patient is otherwise not in any acute distress. Satting well on room air. Vitals hemodynamically stable. PHYSICAL EXAM      General: NAD, AO X 3  Heent: EMOI, PERRL  Neck: SUPPLE, NO JVD  Cardiovascular: RRR, S1S2  Pulmonary: CTAB, NO SOB  Abdomen: Generalized abdominal tenderness  Extremities: +2/4 PULSES DISTAL, NO SWELLING  Neuro / Psych: NO NUMBNESS OR TINGLING, MENTATION AT BASELINE    PERTINENT TEST /EXAMS      I have reviewed all available laboratory results.     MEDICATIONS CURRENT   meropenem (MERREM) 1,000 mg in sodium chloride 0.9 % 100 mL IVPB (mini-bag), Q12H  polyethylene glycol (GLYCOLAX) packet 17 g, Daily PRN  LORazepam (ATIVAN) tablet 0.5 mg, Q6H PRN  fluticasone (FLONASE) 50 MCG/ACT nasal spray 1 spray, Daily  morphine (PF) injection 2 mg, Q4H PRN  dextromethorphan-guaiFENesin (ROBITUSSIN-DM)  MG/5ML liquid 10 mL, Q6H PRN  benzocaine-menthol (CEPACOL SORE THROAT) lozenge 1 lozenge, 4x Daily PRN  ferrous sulfate (FE TABS 325) EC tablet 325 mg, Daily with breakfast  metoprolol succinate (TOPROL XL) extended release tablet 25 mg, Daily  folic acid (FOLVITE) tablet 1 mg, Daily  QUEtiapine (SEROQUEL) tablet 25 mg, Daily  QUEtiapine (SEROQUEL) tablet 100 mg, Nightly  atorvastatin (LIPITOR) tablet 40 mg, Daily  calcium carb-cholecalciferol 250-125 MG-UNIT per tab 2 tablet, BID  lactobacillus (CULTURELLE) capsule 1 capsule, Daily with breakfast  budesonide-formoterol (SYMBICORT) 80-4.5 MCG/ACT inhaler 2 puff, TID PRN  gabapentin (NEURONTIN) tablet 300 mg, TID  sucralfate (CARAFATE) tablet 1 g, 4 times per day  polyethylene glycol (GLYCOLAX) packet 17 g, Daily  calcium carbonate (TUMS) chewable tablet 500 mg, TID PRN  amitriptyline (ELAVIL) tablet 25 mg, Nightly  escitalopram (LEXAPRO) tablet 10 mg, Daily  clonazePAM (KLONOPIN) tablet 0.5 mg, BID PRN  pantoprazole (PROTONIX) tablet 40 mg, BID  sodium chloride flush 0.9 % injection 5-40 mL, 2 times per day  sodium chloride flush 0.9 % injection 5-40 mL, PRN  0.9 % sodium chloride infusion, PRN  enoxaparin (LOVENOX) injection 40 mg, Daily  acetaminophen (TYLENOL) tablet 650 mg, Q4H PRN  ondansetron (ZOFRAN-ODT) disintegrating tablet 4 mg, Q8H PRN   Or  ondansetron (ZOFRAN) injection 4 mg, Q6H PRN  traZODone (DESYREL) tablet 100 mg, Nightly  busPIRone (BUSPAR) tablet 10 mg, BID  metoclopramide (REGLAN) tablet 5 mg, 4x Daily AC & HS  LORazepam (ATIVAN) tablet 1 mg, Nightly PRN        All medication charted and reviewed. CONSULTS      IP CONSULT TO HOSPITALIST  IP CONSULT TO INFECTIOUS DISEASES    ASSESSMENT/PLAN       Nicole Hernandez is a 68 y.o. female who presents with febrile illness of unclear etiology. Consider UTI as source however not definite as she has been on meropenem. Patient has been afebrile since this morning with a temperature of 98.2 to 98.4 °F.  Patient had some mild abdominal pain today. Patient is not septic. Not tachycardic. Satting well on room air. IInfectious disease consulted, pending evaluation and recommendations. We will continue the patient on her Klonopin and as needed Ativan for her anxiety. Continue home medications.     1.)  Abdominal pain  -Meropenem 1 g every 12  -MiraLAX and Protonix  -Pending infectious disease consult and evaluation  -Continue home antihypertensives    · Continue home medications and pain control  · Monitor vitals, labs, and imaging  · DISPO: pending consults and clinical improvement    --  Katelynn Villanueva DO  Emergency Medicine Resident Physician     This dictation was generated by voice recognition computer software. Although all attempts are made to edit the dictation for accuracy, there may be errors in the transcription that are not intended.

## 2022-05-08 NOTE — PROGRESS NOTES
[address] [facility]  CDU / OBSERVATION ENCOUNTER  ATTENDING NOTE       I performed a history and physical examination of the patient and discussed management with the resident or midlevel provider. I reviewed the resident or midlevel provider's note and agree with the documented findings and plan of care. Any areas of disagreement are noted on the chart. I was personally present for the key portions of any procedures. I have documented in the chart those procedures where I was not present during the key portions. I have reviewed the nurses notes. I agree with the chief complaint, past medical history, past surgical history, allergies, medications, social and family history as documented unless otherwise noted below. The Family history, social history, and ROS are effectively unchanged since admission unless noted elsewhere in the chart. Patient admitted for treatment of urinary tract infection. Patient with complaints of suprapubic pain and tenderness. Patient had been placed on meropenem. Patient has complaints of abdominal distention and constipation today. Patient had elevated lactate in emergency department. Patient's lactate is returned to normal.  Patient has had negative blood cultures but urine cultures are positive for greater than 100,000 colony-forming units of gram-negative bacteria. There was a lapse in antibiotic coverage late yesterday. Patient is remained afebrile. Patient's blood cultures are negative. Patient is nontoxic today. Patient has expectation of seeing ID consultant. Patient for abdominal series looking at degree of constipation. Patient to be given MiraLAX. Appreciate consultant input.     Mirtha Stern MD  Attending Emergency  Physician

## 2022-05-09 LAB
CULTURE: ABNORMAL
SPECIMEN DESCRIPTION: ABNORMAL

## 2022-05-09 PROCEDURE — 2580000003 HC RX 258: Performed by: INTERNAL MEDICINE

## 2022-05-09 PROCEDURE — 97162 PT EVAL MOD COMPLEX 30 MIN: CPT

## 2022-05-09 PROCEDURE — 97530 THERAPEUTIC ACTIVITIES: CPT

## 2022-05-09 PROCEDURE — 6360000002 HC RX W HCPCS: Performed by: STUDENT IN AN ORGANIZED HEALTH CARE EDUCATION/TRAINING PROGRAM

## 2022-05-09 PROCEDURE — 6360000002 HC RX W HCPCS: Performed by: INTERNAL MEDICINE

## 2022-05-09 PROCEDURE — 2580000003 HC RX 258: Performed by: STUDENT IN AN ORGANIZED HEALTH CARE EDUCATION/TRAINING PROGRAM

## 2022-05-09 PROCEDURE — 99232 SBSQ HOSP IP/OBS MODERATE 35: CPT | Performed by: INTERNAL MEDICINE

## 2022-05-09 PROCEDURE — 6370000000 HC RX 637 (ALT 250 FOR IP): Performed by: STUDENT IN AN ORGANIZED HEALTH CARE EDUCATION/TRAINING PROGRAM

## 2022-05-09 PROCEDURE — 6370000000 HC RX 637 (ALT 250 FOR IP): Performed by: EMERGENCY MEDICINE

## 2022-05-09 PROCEDURE — 1200000000 HC SEMI PRIVATE

## 2022-05-09 RX ADMIN — MEROPENEM 1000 MG: 1 INJECTION, POWDER, FOR SOLUTION INTRAVENOUS at 10:59

## 2022-05-09 RX ADMIN — SUCRALFATE 1 G: 1 TABLET ORAL at 13:40

## 2022-05-09 RX ADMIN — GUAIFENESIN DEXTROMETHORPHAN HYDROBROMIDE ORAL SOLUTION 10 ML: 200; 20 SOLUTION ORAL at 16:00

## 2022-05-09 RX ADMIN — ACETAMINOPHEN 650 MG: 325 TABLET ORAL at 20:21

## 2022-05-09 RX ADMIN — ACETAMINOPHEN 650 MG: 325 TABLET ORAL at 09:14

## 2022-05-09 RX ADMIN — GABAPENTIN 300 MG: 600 TABLET ORAL at 20:34

## 2022-05-09 RX ADMIN — SODIUM CHLORIDE, PRESERVATIVE FREE 10 ML: 5 INJECTION INTRAVENOUS at 20:35

## 2022-05-09 RX ADMIN — ACETAMINOPHEN 650 MG: 325 TABLET ORAL at 13:40

## 2022-05-09 RX ADMIN — Medication 1 CAPSULE: at 09:15

## 2022-05-09 RX ADMIN — ERTAPENEM 1000 MG: 1 INJECTION INTRAMUSCULAR; INTRAVENOUS at 16:04

## 2022-05-09 RX ADMIN — CALCIUM CARBONATE-CHOLECALCIFEROL TAB 250 MG-125 UNIT 2 TABLET: 250-125 TAB at 20:36

## 2022-05-09 RX ADMIN — FOLIC ACID 1 MG: 1 TABLET ORAL at 09:14

## 2022-05-09 RX ADMIN — DESMOPRESSIN ACETATE 40 MG: 0.2 TABLET ORAL at 20:35

## 2022-05-09 RX ADMIN — POLYETHYLENE GLYCOL 3350 17 G: 17 POWDER, FOR SOLUTION ORAL at 09:10

## 2022-05-09 RX ADMIN — PANTOPRAZOLE SODIUM 40 MG: 40 TABLET, DELAYED RELEASE ORAL at 09:15

## 2022-05-09 RX ADMIN — SUCRALFATE 1 G: 1 TABLET ORAL at 17:57

## 2022-05-09 RX ADMIN — TRAZODONE HYDROCHLORIDE 100 MG: 100 TABLET ORAL at 20:34

## 2022-05-09 RX ADMIN — GUAIFENESIN DEXTROMETHORPHAN HYDROBROMIDE ORAL SOLUTION 10 ML: 200; 20 SOLUTION ORAL at 09:24

## 2022-05-09 RX ADMIN — ESCITALOPRAM OXALATE 10 MG: 10 TABLET ORAL at 09:14

## 2022-05-09 RX ADMIN — QUETIAPINE FUMARATE 25 MG: 25 TABLET ORAL at 09:15

## 2022-05-09 RX ADMIN — METOCLOPRAMIDE 5 MG: 5 TABLET ORAL at 17:57

## 2022-05-09 RX ADMIN — BUSPIRONE HYDROCHLORIDE 10 MG: 10 TABLET ORAL at 20:35

## 2022-05-09 RX ADMIN — Medication 240 ML: at 16:06

## 2022-05-09 RX ADMIN — CLONAZEPAM 0.5 MG: 0.5 TABLET ORAL at 20:39

## 2022-05-09 RX ADMIN — FLUTICASONE PROPIONATE 1 SPRAY: 50 SPRAY, METERED NASAL at 09:17

## 2022-05-09 RX ADMIN — CALCIUM CARBONATE-CHOLECALCIFEROL TAB 250 MG-125 UNIT 2 TABLET: 250-125 TAB at 09:14

## 2022-05-09 RX ADMIN — ENOXAPARIN SODIUM 40 MG: 100 INJECTION SUBCUTANEOUS at 09:15

## 2022-05-09 RX ADMIN — GABAPENTIN 300 MG: 600 TABLET ORAL at 09:14

## 2022-05-09 RX ADMIN — GABAPENTIN 300 MG: 600 TABLET ORAL at 13:40

## 2022-05-09 RX ADMIN — SUCRALFATE 1 G: 1 TABLET ORAL at 06:22

## 2022-05-09 RX ADMIN — BUSPIRONE HYDROCHLORIDE 10 MG: 10 TABLET ORAL at 09:15

## 2022-05-09 RX ADMIN — AMITRIPTYLINE HYDROCHLORIDE 25 MG: 25 TABLET, FILM COATED ORAL at 20:35

## 2022-05-09 RX ADMIN — METOCLOPRAMIDE 5 MG: 5 TABLET ORAL at 06:22

## 2022-05-09 RX ADMIN — METOPROLOL SUCCINATE 25 MG: 25 TABLET, FILM COATED, EXTENDED RELEASE ORAL at 09:14

## 2022-05-09 RX ADMIN — QUETIAPINE FUMARATE 100 MG: 100 TABLET ORAL at 20:34

## 2022-05-09 RX ADMIN — PANTOPRAZOLE SODIUM 40 MG: 40 TABLET, DELAYED RELEASE ORAL at 20:34

## 2022-05-09 RX ADMIN — METOCLOPRAMIDE 5 MG: 5 TABLET ORAL at 09:15

## 2022-05-09 RX ADMIN — METOCLOPRAMIDE 5 MG: 5 TABLET ORAL at 20:34

## 2022-05-09 RX ADMIN — SODIUM CHLORIDE, PRESERVATIVE FREE 10 ML: 5 INJECTION INTRAVENOUS at 09:16

## 2022-05-09 ASSESSMENT — ENCOUNTER SYMPTOMS
ABDOMINAL PAIN: 1
COLOR CHANGE: 0
CHOKING: 0
COUGH: 0
EYE DISCHARGE: 0
APNEA: 0
CONSTIPATION: 1

## 2022-05-09 ASSESSMENT — PAIN SCALES - GENERAL
PAINLEVEL_OUTOF10: 6
PAINLEVEL_OUTOF10: 2
PAINLEVEL_OUTOF10: 3
PAINLEVEL_OUTOF10: 3

## 2022-05-09 ASSESSMENT — PAIN SCALES - WONG BAKER: WONGBAKER_NUMERICALRESPONSE: 2

## 2022-05-09 ASSESSMENT — PAIN DESCRIPTION - DESCRIPTORS: DESCRIPTORS: ACHING;DISCOMFORT

## 2022-05-09 ASSESSMENT — PAIN DESCRIPTION - LOCATION: LOCATION: ABDOMEN

## 2022-05-09 ASSESSMENT — PAIN - FUNCTIONAL ASSESSMENT: PAIN_FUNCTIONAL_ASSESSMENT: ACTIVITIES ARE NOT PREVENTED

## 2022-05-09 NOTE — PROGRESS NOTES
OBS/CDU   RESIDENT NOTE      Patients PCP is:  Amparo Maradiaga MD        SUBJECTIVE      Patient is 45-year-old female presented to the ER with a fever at home, patient is being treated with meropenem for UTI. Patient evaluated by infectious disease patient did well overnight, she was feeling short of breath when she arrived to the ER. Patient continues to have generalized abdominal pain however is tolerating p.o. intake. Patient is otherwise not in any acute distress. Satting well on room air. Vitals hemodynamically stable. PHYSICAL EXAM      General: NAD, AO X 3  Heent: EMOI, PERRL  Neck: SUPPLE, NO JVD  Cardiovascular: RRR, S1S2  Pulmonary: CTAB, NO SOB  Abdomen: Generalized abdominal tenderness  Extremities: +2/4 PULSES DISTAL, NO SWELLING  Neuro / Psych: NO NUMBNESS OR TINGLING, MENTATION AT BASELINE    PERTINENT TEST /EXAMS      I have reviewed all available laboratory results.     MEDICATIONS CURRENT   meropenem (MERREM) 1,000 mg in sodium chloride 0.9 % 100 mL IVPB (mini-bag), Q12H  bisacodyl (DULCOLAX) suppository 10 mg, Daily PRN  magnesium hydroxide (MILK OF MAGNESIA) 400 MG/5ML suspension 30 mL, Daily PRN  polyethylene glycol (GLYCOLAX) packet 17 g, Daily PRN  LORazepam (ATIVAN) tablet 0.5 mg, Q6H PRN  fluticasone (FLONASE) 50 MCG/ACT nasal spray 1 spray, Daily  morphine (PF) injection 2 mg, Q4H PRN  dextromethorphan-guaiFENesin (ROBITUSSIN-DM)  MG/5ML liquid 10 mL, Q6H PRN  benzocaine-menthol (CEPACOL SORE THROAT) lozenge 1 lozenge, 4x Daily PRN  ferrous sulfate (FE TABS 325) EC tablet 325 mg, Daily with breakfast  metoprolol succinate (TOPROL XL) extended release tablet 25 mg, Daily  folic acid (FOLVITE) tablet 1 mg, Daily  QUEtiapine (SEROQUEL) tablet 25 mg, Daily  QUEtiapine (SEROQUEL) tablet 100 mg, Nightly  atorvastatin (LIPITOR) tablet 40 mg, Daily  calcium carb-cholecalciferol 250-125 MG-UNIT per tab 2 tablet, BID  lactobacillus (CULTURELLE) capsule 1 capsule, Daily with breakfast  budesonide-formoterol (SYMBICORT) 80-4.5 MCG/ACT inhaler 2 puff, TID PRN  gabapentin (NEURONTIN) tablet 300 mg, TID  sucralfate (CARAFATE) tablet 1 g, 4 times per day  polyethylene glycol (GLYCOLAX) packet 17 g, Daily  calcium carbonate (TUMS) chewable tablet 500 mg, TID PRN  amitriptyline (ELAVIL) tablet 25 mg, Nightly  escitalopram (LEXAPRO) tablet 10 mg, Daily  clonazePAM (KLONOPIN) tablet 0.5 mg, BID PRN  pantoprazole (PROTONIX) tablet 40 mg, BID  sodium chloride flush 0.9 % injection 5-40 mL, 2 times per day  sodium chloride flush 0.9 % injection 5-40 mL, PRN  0.9 % sodium chloride infusion, PRN  enoxaparin (LOVENOX) injection 40 mg, Daily  acetaminophen (TYLENOL) tablet 650 mg, Q4H PRN  ondansetron (ZOFRAN-ODT) disintegrating tablet 4 mg, Q8H PRN   Or  ondansetron (ZOFRAN) injection 4 mg, Q6H PRN  traZODone (DESYREL) tablet 100 mg, Nightly  busPIRone (BUSPAR) tablet 10 mg, BID  metoclopramide (REGLAN) tablet 5 mg, 4x Daily AC & HS  LORazepam (ATIVAN) tablet 1 mg, Nightly PRN        All medication charted and reviewed. CONSULTS      IP CONSULT TO HOSPITALIST  IP CONSULT TO INFECTIOUS DISEASES    ASSESSMENT/PLAN       Robb Mckeon is a 68 y.o. female who presents with febrile illness of unclear etiology. Consider UTI as source however not definite as she has been on meropenem. Patient has been afebrile since this morning with a temperature of 98.2 to 98.4 °F.  Patient had some mild abdominal pain today. Patient is not septic. Not tachycardic. Satting well on room air. We will continue the patient on her Klonopin and as needed Ativan for her anxiety. Continue home medications. 1.)  Abdominal pain  -Meropenem 1 g every 12, continued per infectious disease recommendations   -Will need to be continued for a total of 10 days, started 5/8/2022 in the observation unit, received 1 dose in the emergency room. Will plan to continue until 5/17/2022    -MiraLAX and Protonix.   Will add additional medications for bowel regimen as needed, titrate to bowel movement.  -Pending infectious disease consult and evaluation  -Continue home antihypertensives    · Continue home medications and pain control  · Monitor vitals, labs, and imaging  · DISPO: pending consults and clinical improvement    --  Trina Magallon DO  Emergency Medicine Resident Physician     This dictation was generated by voice recognition computer software. Although all attempts are made to edit the dictation for accuracy, there may be errors in the transcription that are not intended.

## 2022-05-09 NOTE — CONSULTS
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  · Many UTIs since  · Severe allergic reaction to benzathine PNC as child  · constipated    Other:  ·   Discussion / summary of stay / plan of care   ·   Recommendations   · Agree w meropenem-   · Await U cx and try to move to cipro if an option  · Needs 10 days AB total  · CTAP neg for hydro  · Laxatives for a good BM    Infection Control Recommendations   · [place] Precautions    Antimicrobial Stewardship Recommendations   · Simplification of therapy  · Targeted therapy      History of Present Illness:   Initial history:  Deon Whitman is a 68y.o.-year-old female presents w fatigue fever and lower abd pain - not feeling good -and supra pubic tenderness  U cx w GNR and started on meropenem - today not better and severe PNC allergy , ID called  case dis w Dr Caitie Penaloza -  Constipated         Interval changes  5/8/2022   No data found. Summary of relevant labs:  Labs:    Micro:  U cx ,000    Imaging:      I have personally reviewed the past medical history, past surgical history, medications, social history, and family history, and I haveupdated the database accordingly. Allergies:   Prednisone, Compazine [prochlorperazine maleate], Penicillin v potassium, and Penicillins     Review of Systems:     Review of Systems   Constitutional: Negative for activity change. HENT: Negative for congestion. Eyes: Negative for discharge. Respiratory: Negative for apnea. Cardiovascular: Negative for chest pain. Gastrointestinal: Positive for abdominal pain and constipation. Endocrine: Negative for cold intolerance. Genitourinary: Positive for dysuria and flank pain. Musculoskeletal: Negative for arthralgias.    Skin: Negative for color change. Allergic/Immunologic: Negative for immunocompromised state. Neurological: Negative for dizziness. Hematological: Negative for adenopathy. Psychiatric/Behavioral: Negative for agitation. Physical Examination :       Physical Exam  Constitutional:       Appearance: Normal appearance. HENT:      Head: Normocephalic and atraumatic. Nose: Nose normal.      Mouth/Throat:      Mouth: Mucous membranes are moist.   Eyes:      General: No scleral icterus. Pupils: Pupils are equal, round, and reactive to light. Cardiovascular:      Rate and Rhythm: Normal rate and regular rhythm. Heart sounds: Normal heart sounds. No murmur heard. Pulmonary:      Effort: No respiratory distress. Breath sounds: Normal breath sounds. Abdominal:      General: There is no distension. Palpations: Abdomen is soft. Tenderness: There is abdominal tenderness. Genitourinary:     Comments: No chu  Musculoskeletal:         General: No swelling or deformity. Cervical back: Neck supple. No rigidity. Skin:     General: Skin is dry. Coloration: Skin is not jaundiced. Neurological:      General: No focal deficit present. Mental Status: She is alert and oriented to person, place, and time. Mental status is at baseline. Psychiatric:         Mood and Affect: Mood normal.         Thought Content:  Thought content normal.         Past Medical History:     Past Medical History:   Diagnosis Date    Anxiety     Arthritis     Back pain     Bronchitis     Caffeine use     8 coffee / day    Carpal tunnel syndrome     Cataracts, bilateral     Constipation     COPD (chronic obstructive pulmonary disease) (HCC)     Emphysema    Depression     Diarrhea     GERD (gastroesophageal reflux disease)     H/O gastric ulcer     Hyperlipidemia     Hypertension     Mumps     MVP (mitral valve prolapse)     Dr. Cheri Irizarry in May 2019    Recurrent UTI     Dr. Layton Jaimes    Sinusitis  Trache[facility] in place [facility])     Ulcerative esophagitis     Wellness examination     Dr. Geovani Dietz seen in Jan 2020       Past Surgical  History:     Past Surgical History:   Procedure Laterality Date    APPENDECTOMY      CARPAL TUNNEL RELEASE      CHOLECYSTECTOMY, LAPAROSCOPIC N/A 05/22/2020    XI LAPAROSCOPIC ROBOTIC CHOLECYSTECTOMY, PYLOROPLASTY performed by Tim Carpio MD at [address] COLONOSCOPY N/A 1/28/2022    COLONOSCOPY POLYPECTOMY HOT performed by Elin Moody MD at [address] ERCP  05/21/2020    with stent insertion, balloon dilation, sphinctereotomy    ERCP  05/21/2020    ** CASE IN OR WITY GI STAFF** ERCP STENT INSERTION performed by Elin Moody MD at [facility]    ERCP  05/21/2020    ** CASE IN OR WITH GI STAFF**ERCP SPHINCTER/PAPILLOTOMY performed by Elin Moody MD at [facility]    ERCP  05/21/2020    ** CASE IN OR WITH GI STAFF**ERCP DILATION BALLOON performed by Elin Moody MD at [facility]    ERCP N/A 2/8/2021    ERCP STENT REMOVAL performed by Tamar Vyas MD at [facility]    ERCP  2/8/2021    ERCP STONE REMOVAL performed by Tamar Vyas MD at [address]    GASTRIC FUNDOPLICATION N/A 31/40/6211    XI LAPAROSCOPIC ROBOTIC HIATAL HERNIA REPAIR, CHERYL FUNDOPLICATION performed by Tim Carpio MD at [address] N/A 03/27/2020    EGD PEG TUBE PLACEMENT performed by Tim Carpio MD at [address] N/A 2/8/2021    **CASE IN O.R. W/ GI STAFF - EGD WITH REMOVAL PEG TUBE performed by Tamar Vyas MD at [facility]    [address] CATH POWER PICC TRIPLE  03/04/2020         HERNIA REPAIR      Hiatal hernia    HYSTERECTOMY      Abdominal    JOINT REPLACEMENT Right     right hip    OVARY REMOVAL      RHINOPLASTY      TONSILLECTOMY      TOTAL HIP ARTHROPLASTY      TOTAL NEPHRECTOMY      atrophic from infections, age 13   Phillip Medal TRACHEOSTOMY N/A 03/27/2020    **ADD ON **WANTS 10:00AM **TRACHEOTOMY performed by Nick Golden MD at [address] 04/02/2020    TRACHEOTOMY EXCHANGE performed by Nick Golden MD at [address] [facility] N/A 03/17/2020    BEDSIDE EGD ESOPHAGOGASTRODUODENOSCOPY (ICU) performed by Nick Golden MD at [facility]   [address] N/A 05/18/2020    EGD BIOPSY performed by Kelly Miller MD at [address] [place] [address]  05/18/2020    EGD DILATION BALLOON performed by Kelly Miller MD at [address] [place] [address] N/A 07/06/2020    ** CASE IN O.R. WITH GI STAFF** EGD ESOPHAGOGASTRODUODENOSCOPY performed by Tod Bush MD at [address] [place] [address] N/A 11/09/2020    EGD DIAGNOSTIC ONLY performed by Laina Ng MD at [address] [place] [address]  02/08/2021    - EGD WITH REMOVAL PEG TUBE,ERCP STENT REMOVAL,ERCP STONE REMOVAL    UPPER GASTROINTESTINAL ENDOSCOPY N/A 11/24/2021    EGD BIOPSY performed by Laina Ng MD at [facility]       Medications:      meropenem  1,000 mg IntraVENous Q12H    fluticasone  1 spray Each Nostril Daily    ferrous sulfate  325 mg Oral Daily with breakfast    metoprolol succinate  25 mg Oral Daily    folic acid  1 mg Oral Daily    QUEtiapine  25 mg Oral Daily    QUEtiapine  100 mg Oral Nightly    atorvastatin  40 mg Oral Daily    calcium carb-cholecalciferol  2 tablet Oral BID    lactobacillus  1 capsule Oral Daily with breakfast    gabapentin  300 mg Oral TID    sucralfate  1 g Oral 4 times per day    polyethylene glycol  17 g Oral Daily    amitriptyline  25 mg Oral Nightly    escitalopram  10 mg Oral Daily    pantoprazole  40 mg Oral BID    sodium chloride flush  5-40 mL IntraVENous 2 times per day    enoxaparin  40 mg SubCUTAneous Daily    traZODone 100 mg Oral Nightly    busPIRone  10 mg Oral BID    metoclopramide  5 mg Oral 4x Daily AC & HS       Social History:     Social History     Socioeconomic History    Marital status:      Spouse name: Not on file    Number of children: 2    Years of education: Not on file    Highest education level: Not on file   Occupational History    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   Tobacco Use    Smoking status: Former Smoker     Packs/day: 1.00     Years: 50.00     Pack years: 50.00     Types: Cigarettes    Smokeless tobacco: Never Used    Tobacco comment: quit 1 year ago    Vaping Use    Vaping Use: Former    Substances: Always   Substance and Sexual Activity    Alcohol use: No    Drug use: No    Sexual activity: Never   Other Topics Concern    Not on file   Social History Narrative    Not on file     Social Determinants of Health     Financial Resource Strain:     Difficulty of Paying Living Expenses: Not on file   Food Insecurity:     Worried About [address] in the Last Year: Not on file    [address] in the Last Year: Not on file   Transportation Needs:     Lack of Transportation (Medical): Not on file    Lack of Transportation (Non-Medical):  Not on file   Physical Activity:     Days of Exercise per Week: Not on file    Minutes of Exercise per Session: Not on file   Stress:     Feeling of Stress : Not on file   Social Connections:     Frequency of Communication with Friends and Family: Not on file    Frequency of Social Gatherings with Friends and Family: Not on file    Attends Gnosticist Services: Not on file    Active Member of Clubs or Organizations: Not on file    Attends Club or Organization Meetings: Not on file    Marital Status: Not on file   Intimate Partner Violence:     Fear of Current or Ex-Partner: Not on file    Emotionally Abused: Not on file    Physically Abused: Not on file    Sexually Abused: Not on file   Housing Stability:     Unable to Pay for Housing in the Last Year: Not on file    Number of Places Lived in the Last Year: Not on file    Unstable Housing in the Last Year: Not on file       Family History:     Family History   Problem Relation Age of Onset    Cancer Mother         uterine    Heart Attack Mother     Heart Attack Father     Other Maternal Grandfather         foot gangrene    Other Paternal Grandmother         bleeding ulcers    Other Paternal Grandfather         lung cancer      Medical Decision Making:   I have independently reviewed/ordered the following labs:    CBC with Differential:   Recent Labs     05/06/22  1028   WBC 8.7   HGB 14.2   HCT 45.0      LYMPHOPCT 7*   MONOPCT 7     BMP:  Recent Labs     05/06/22  1028      K 4.0      CO2 23   BUN 16   CREATININE 1.13*     Hepatic Function Panel:   Recent Labs     05/06/22  1028   PROT 6.4   LABALBU 3.9   BILITOT 0.30   ALKPHOS 118*   ALT 21   AST 24     No results for input(s): RPR in the last 72 hours. No results for input(s): HIV in the last 72 hours. No results for input(s): BC in the last 72 hours. Lab Results   Component Value Date    CREATININE 1.13 05/06/2022    GLUCOSE 145 05/06/2022       Detailed results: Thank you for allowing us to participate in the care of this patient. Please call with questions. This note is created with the assistance of a speech recognition program.  While intending to generate adocument that actually reflects the content of the visit, the document can still have some errors including those of syntax and sound a like substitutions which may escape proof reading. It such instances, actual meaningcan be extrapolated by contextual diversion.     Galindo Vilchis MD  Office: [phone]  Perfect serve / office [phone]

## 2022-05-09 NOTE — PLAN OF CARE
Problem: Skin/Tissue Integrity  Goal: Absence of new skin breakdown  Description: 1. Monitor for areas of redness and/or skin breakdown  2. Assess vascular access sites hourly  3. Every 4-6 hours minimum:  Change oxygen saturation probe site  4. Every 4-6 hours:  If on nasal continuous positive airway pressure, respiratory therapy assess nares and determine need for appliance change or resting period.   Outcome: Progressing     Problem: Safety - Adult  Goal: Free from fall injury  Outcome: Progressing     Problem: Pain  Goal: Verbalizes/displays adequate comfort level or baseline comfort level  Outcome: Progressing

## 2022-05-09 NOTE — PROGRESS NOTES
Physical Therapy  Facility/Department: [facility]  Physical Therapy Initial Assessment    Name: Rasheeda Mckeon  : 1945  MRN: [id number]  Date of Service: 2022  Chief Complaint   Patient presents with    Fever     neg covid     Discharge Recommendations:  Patient would benefit from continued therapy after discharge      Patient Diagnosis(es): The encounter diagnosis was Acute cystitis with hematuria. Past Medical History:  has a past medical history of Anxiety, Arthritis, Back pain, Bronchitis, Caffeine use, Carpal tunnel syndrome, Cataracts, bilateral, Constipation, COPD (chronic obstructive pulmonary disease) ([address] Depression, Diarrhea, GERD (gastroesophageal reflux disease), H/O gastric ulcer, Hyperlipidemia, Hypertension, Mumps, MVP (mitral valve prolapse), Recurrent UTI, Sinusitis, Tracheostomy in place [facility]), Ulcerative esophagitis, and Wellness examination. Past Surgical History:  has a past surgical history that includes Hysterectomy; total nephrectomy; Tonsillectomy; Appendectomy; Cystoscopy; Ovary removal; Tubal ligation; rhinoplasty; Carpal tunnel release; Hand surgery; Total hip arthroplasty; eye surgery; Colonoscopy; joint replacement (Right); Gastric fundoplication (N/A, ); hc cath power picc triple (2020); Upper gastrointestinal endoscopy (N/A, 2020); tracheostomy (N/A, 2020); Gastrostomy tube placement (N/A, 2020); tracheostomy (N/A, 2020); Upper gastrointestinal endoscopy (N/A, 2020); Upper gastrointestinal endoscopy (2020); ERCP (2020); ERCP (2020); ERCP (2020); ERCP (2020); Cholecystectomy, laparoscopic (N/A, 2020); Upper gastrointestinal endoscopy (N/A, 2020); Upper gastrointestinal endoscopy (N/A, 2020); Upper gastrointestinal endoscopy (2021); Gastrostomy tube placement (N/A, 2021); ERCP (N/A, 2021); ERCP (2021);  Upper gastrointestinal endoscopy (N/A, 11/24/2021); hernia repair; and Colonoscopy (N/A, 1/28/2022). Assessment   Body Structures, Functions, Activity Limitations Requiring Skilled Therapeutic Intervention: Decreased functional mobility ; Decreased strength;Decreased endurance  Assessment: The pt transferred supine to sit with mod assist. She was limited due to some agitation and not wanting to do very much today.  She could benefit from a continuation of PT fot transfers and strengthening following her DC  Therapy Prognosis: Good  Decision Making: Medium Complexity  Requires PT Follow-Up: Yes  Activity Tolerance  Activity Tolerance: Patient limited by fatigue;Patient limited by endurance     Plan   Plan  Plan:  (5-6x wk)  Current Treatment Recommendations: Strengthening,Transfer training,Endurance training,Pain management,Safety education & training  Safety Devices  Type of Devices: Nurse notified,Left in bed,Call light within reach     Restrictions  Restrictions/Precautions  Restrictions/Precautions: Up as Tolerated  Required Braces or Orthoses?: No     Subjective   General  Patient assessed for rehabilitation services?: Yes  Response To Previous Treatment: Not applicable  Family / Caregiver Present: No  Follows Commands: Within Functional Limits  Subjective  Subjective: Pt reports  abd pain of 7/10       RN and pt agreeable to PT eval     Social/Functional History  Social/Functional History  Type of Home: Facility  Home Layout: One level  Home Access: Level entry  Bathroom Shower/Tub: Walk-in shower  Bathroom Toilet: Handicap height  Bathroom Equipment: Grab bars in shower,Grab bars around toilet  Home Equipment: [address] Help From: Home health  ADL Assistance: Needs assistance  Ambulation Assistance: Needs assistance  Transfer Assistance: Independent  Active : No  Patient's  Info: Pt reports son drives; Alecia Caporlando service at facility for dr underwood  Occupation: Retired  Leisure & Hobbies: Bingo, cards  Vision/Hearing  Vision Exceptions: Wears glasses at all times  Hearing: Within functional limits    Cognition   Orientation  Overall Orientation Status: Within Functional Limits  Orientation Level: Oriented X4  Cognition  Overall Cognitive Status: WFL     Objective   Pulse: 78  Heart Rate Source: Monitor  BP: 126/80  BP Location: Left upper arm  BP Method: Automatic  Patient Position: High fowlers  MAP (Calculated): 95.33  Resp: 20  SpO2: 92 %  O2 Device: Nasal cannula     Gross Assessment  AROM: Generally decreased, functional  PROM: Generally decreased, functional  Strength: Generally decreased, functional  Coordination: Generally decreased, functional     AROM RLE (degrees)  RLE AROM: WFL  AROM LLE (degrees)  LLE AROM : WFL  AROM RUE (degrees)  RUE AROM : WFL  AROM LUE (degrees)  LUE AROM : WFL  Strength RLE  Strength RLE: WFL  Strength LLE  Strength LLE: WFL  Strength RUE  Strength RUE: WFL  Strength LUE  Strength LUE: WFL        Bed mobility  Supine to Sit: Moderate assistance  Sit to Supine: Moderate assistance  Scooting: Moderate assistance     Ambulation  Assistance:  Moderate assistance  Distance: Supine to sit with mod assist     Balance  Posture: Good  Sitting - Static: Fair  Sitting - Dynamic: Poor         OutComes Score     AM-PAC Score  AM-PAC Inpatient Mobility Raw Score : 8 (05/09/22 1528)  AM-PAC Inpatient T-Scale Score : 28.52 (05/09/22 1528)  Mobility Inpatient CMS 0-100% Score: 86.62 (05/09/22 1528)  Mobility Inpatient CMS G-Code Modifier : CM (05/09/22 1528)     Goals  Short Term Goals  Time Frame for Short term goals: 10 visits  Short term goal 1: supine to sit with CGA  Short term goal 2: bed to chair with CGA  Short term goal 3: SBA with bed mobility  Short term goal 4: 20 min strengthening exercise program     Therapy Time   Individual Concurrent Group Co-treatment   Time In 1305         Time Out 1328         Minutes 23             1 of [facility] [address] [place] [address],

## 2022-05-09 NOTE — PROGRESS NOTES
[address]  CDU / OBSERVATION ENCOUNTER  ATTENDING NOTE       I performed a history and physical examination of the patient and discussed management with the resident. I reviewed the residents note and agree with the documented findings and plan of care. Any areas of disagreement are noted on the chart. I was personally present for the key portions of any procedures. I have documented in the chart those procedures where I was not present during the key portions. I have reviewed the nurses notes. I agree with the chief complaint, past medical history, past surgical history, allergies, medications, social and family history as documented unless otherwise noted below. The Family history, social history, and ROS are effectively unchanged since admission unless noted elsewhere in the chart. The patient is a 68year old female with history of recurrent UTI, right kidney removed as a child, PCN allergy, and constipation who was admitted for a complicated UTI. She had a fever and elevated lactic acid in the ER which has improved. CT abdomen pelvis showed no evidence of hydronephrosis. Blood cultures have been negative but urine culture did grow Klebsiella that is resistant to PCN, cephalosporins, and Aztreonam. ID was consulted and the patient is currently being treated with Meropenem. She is also receiving miralax for constipation. Will await further ID recommendations. The patient was concerned that she was not receiving her correct dose of Elavil at her nursing home and we will reach out to her urologist, Dr. Renae Long, to verify dosing. She is still complaining of constipation and we will add on a milk and molasses enema.       Tirso Encarnacion DO  Attending Emergency Physician

## 2022-05-09 NOTE — PLAN OF CARE
Problem: Skin/Tissue Integrity  Goal: Absence of new skin breakdown  Description: 1. Monitor for areas of redness and/or skin breakdown  2. Assess vascular access sites hourly  3. Every 4-6 hours minimum:  Change oxygen saturation probe site  4. Every 4-6 hours:  If on nasal continuous positive airway pressure, respiratory therapy assess nares and determine need for appliance change or resting period.   5/9/2022 1831 by Janae Ann RN  Outcome: Progressing  5/9/2022 0511 by Dale Mcclendon RN  Outcome: Progressing     Problem: Safety - Adult  Goal: Free from fall injury  5/9/2022 1831 by Janae Ann RN  Outcome: Progressing  5/9/2022 0511 by Dale Mcclendon RN  Outcome: Progressing     Problem: Pain  Goal: Verbalizes/displays adequate comfort level or baseline comfort level  5/9/2022 1831 by Janae Ann RN  Outcome: Progressing  5/9/2022 0511 by Dale Mcclendon RN  Outcome: Progressing

## 2022-05-09 NOTE — PLAN OF CARE
Problem: Skin/Tissue Integrity  Goal: Absence of new skin breakdown  Description: 1. Monitor for areas of redness and/or skin breakdown  2. Assess vascular access sites hourly  3. Every 4-6 hours minimum:  Change oxygen saturation probe site  4. Every 4-6 hours:  If on nasal continuous positive airway pressure, respiratory therapy assess nares and determine need for appliance change or resting period.   5/9/2022 0511 by Mahendra Ortiz RN  Outcome: Progressing  5/8/2022 2035 by Nichole Shelton RN  Outcome: Progressing     Problem: Safety - Adult  Goal: Free from fall injury  5/9/2022 0511 by Mahendra Ortiz RN  Outcome: Progressing  5/8/2022 2035 by Nichole Shelton RN  Outcome: Progressing     Problem: Pain  Goal: Verbalizes/displays adequate comfort level or baseline comfort level  5/9/2022 0511 by Mahendra Ortiz RN  Outcome: Progressing  5/8/2022 2035 by Nichole Shelton RN  Outcome: Progressing

## 2022-05-09 NOTE — PROGRESS NOTES
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  · Many UTIs since  · Severe allergic reaction to benzathine PNC as child  · constipated    Other:  ·   Discussion / summary of stay / plan of care   ·   Recommendations   · Agree w meropenem- 10 days AB total till 5/17  · CTAP neg for hydro  · Laxatives for a good BM  · Ok for DC - reconsiled- midline    Infection Control Recommendations   · Bicknell Precautions    Antimicrobial Stewardship Recommendations   · Simplification of therapy  · Targeted therapy      History of Present Illness:   Initial history:  Felton Diop is a 68y.o.-year-old female presents w fatigue fever and lower abd pain - not feeling good -and supra pubic tenderness  U cx w GNR and started on meropenem - today not better and severe PNC allergy , ID called  case dis w Dr Lorenzo Barrow -  Constipated         Interval changes  5/9/2022   Patient Vitals for the past 8 hrs:   BP Temp Temp src Pulse Resp SpO2   05/09/22 0744 126/80 99.3 °F (37.4 °C) Oral 78 20 92 %     Still very constipated and more meds ordered today  Dysuria better  U cx kleb w ESBL, R cipro and S macrobid  On meropenem and WBC normal  No fever    Summary of relevant labs:  Labs:  W 8.7  Micro:  U cx ,000 ESBL  klebsiella    Imaging:  CTAP neg    I have personally reviewed the past medical history, past surgical history, medications, social history, and family history, and I haveupdated the database accordingly. Allergies:   Prednisone, Compazine [prochlorperazine maleate], Penicillin v potassium, and Penicillins     Review of Systems:     Review of Systems   Constitutional: Negative for activity change. HENT: Negative for congestion. Eyes: Negative for discharge.    Respiratory: Negative for apnea, cough and choking. Cardiovascular: Negative for chest pain. Gastrointestinal: Positive for abdominal pain and constipation. Endocrine: Negative for cold intolerance. Genitourinary: Positive for flank pain. Negative for dysuria. Musculoskeletal: Negative for arthralgias. Skin: Negative for color change. Allergic/Immunologic: Negative for immunocompromised state. Neurological: Negative for dizziness. Hematological: Negative for adenopathy. Psychiatric/Behavioral: Negative for agitation. Physical Examination :       Physical Exam  Constitutional:       General: She is not in acute distress. Appearance: Normal appearance. She is not ill-appearing. HENT:      Head: Normocephalic and atraumatic. Nose: Nose normal.      Mouth/Throat:      Mouth: Mucous membranes are moist.   Eyes:      General: No scleral icterus. Pupils: Pupils are equal, round, and reactive to light. Cardiovascular:      Rate and Rhythm: Normal rate and regular rhythm. Heart sounds: Normal heart sounds. No murmur heard. Pulmonary:      Effort: No respiratory distress. Breath sounds: Normal breath sounds. Abdominal:      General: There is no distension. Palpations: Abdomen is soft. Tenderness: There is abdominal tenderness. Genitourinary:     Comments: No chu  Musculoskeletal:         General: No swelling or deformity. Cervical back: Neck supple. No rigidity. Skin:     General: Skin is dry. Coloration: Skin is not jaundiced. Neurological:      General: No focal deficit present. Mental Status: She is alert and oriented to person, place, and time. Mental status is at baseline. Psychiatric:         Mood and Affect: Mood normal.         Thought Content:  Thought content normal.         Past Medical History:     Past Medical History:   Diagnosis Date    Anxiety     Arthritis     Back pain     Bronchitis     Caffeine use     8 coffee / day    Carpal tunnel syndrome     Cataracts, bilateral     Constipation     COPD (chronic obstructive pulmonary disease) (HCC)     Emphysema    Depression     Diarrhea     GERD (gastroesophageal reflux disease)     H/O gastric ulcer     Hyperlipidemia     Hypertension     Mumps     MVP (mitral valve prolapse)     Dr. Chappell Median in May 2019    Recurrent UTI     Dr. Jaqueline Taylor    Sinusitis     Tracheostomy in place [facility])     Ulcerative esophagitis     Wellness examination     Dr. Wanda Beauchamp seen in Jan 2020       Past Surgical  History:     Past Surgical History:   Procedure Laterality Date    APPENDECTOMY      CARPAL TUNNEL RELEASE      CHOLECYSTECTOMY, LAPAROSCOPIC N/A 05/22/2020    XI LAPAROSCOPIC ROBOTIC CHOLECYSTECTOMY, PYLOROPLASTY performed by Kari Agosto MD at [address] [facility] N/A 1/28/2022    COLONOSCOPY POLYPECTOMY HOT performed by Lonell Simmonds, MD at [address] [place] [address] ERCP  05/21/2020    with stent insertion, balloon dilation, sphinctereotomy    ERCP  05/21/2020    ** CASE IN OR WITY GI STAFF** ERCP STENT INSERTION performed by Lonell Simmonds, MD at [facility]    ERCP  05/21/2020    ** CASE IN OR WITH GI STAFF**ERCP SPHINCTER/PAPILLOTOMY performed by Lonell Simmonds, MD at [facility]    ERCP  05/21/2020    ** CASE IN OR WITH GI STAFF**ERCP DILATION BALLOON performed by Lonell Simmonds, MD at [facility]    ERCP N/A 2/8/2021    ERCP STENT REMOVAL performed by Terrall Dakin, MD at  [place] [address] ERCP  2/8/2021    ERCP STONE REMOVAL performed by Terrall Dakin, MD at [address]    GASTRIC FUNDOPLICATION N/A 00/90/1891    XI LAPAROSCOPIC ROBOTIC HIATAL HERNIA REPAIR, CHERYL FUNDOPLICATION performed by Kari Agosto MD at [place] [address] N/A 03/27/2020    EGD PEG TUBE PLACEMENT performed by Kari Agosto MD at [place] [address] 2/8/2021    **CASE IN O.R. W/ GI STAFF - EGD WITH REMOVAL PEG TUBE performed by Nati Melendez MD at [facility]    HAND SURGERY      [facility]ICC [facility]  03/04/2020         HERNIA REPAIR      Hiatal hernia    HYSTERECTOMY      Abdominal    JOINT REPLACEMENT Right     right hip    OVARY REMOVAL      RHINOPLASTY      TONSILLECTOMY      TOTAL HIP ARTHROPLASTY      TOTAL NEPHRECTOMY      atrophic from infections, age 14    TRACHEOSTOMY N/A 03/27/2020    **ADD ON **WANTS 10:00AM **TRACHEOTOMY performed by Jodi Pino MD at [address] 04/02/2020    TRACHEOTOMY EXCHANGE performed by Jodi Pino MD at [address] N/A 03/17/2020    BEDSIDE EGD ESOPHAGOGASTRODUODENOSCOPY (ICU) performed by Jodi Pino MD at [address] [place] [address] N/A 05/18/2020    EGD BIOPSY performed by Betsy Luke MD at [address] [place] [address]  05/18/2020    EGD DILATION BALLOON performed by Betsy Luke MD at [address] [place] [address] N/A 07/06/2020    ** CASE IN O.R. WITH GI STAFF** EGD ESOPHAGOGASTRODUODENOSCOPY performed by Rand Caal MD at [address] [place] [address] N/A 11/09/2020    EGD DIAGNOSTIC ONLY performed by Sanchez Millan MD at [address] [place] [address]  02/08/2021    - EGD WITH REMOVAL PEG TUBE,ERCP STENT REMOVAL,ERCP STONE REMOVAL    UPPER GASTROINTESTINAL ENDOSCOPY N/A 11/24/2021    EGD BIOPSY performed by Sanchez Milaln MD at [facility]       Medications:      milk and molasses  240 mL Rectal Once    meropenem  1,000 mg IntraVENous Q12H    fluticasone  1 spray Each Nostril Daily    ferrous sulfate  325 mg Oral Daily with breakfast    metoprolol succinate  25 mg Oral Daily    folic acid  1 mg Oral Daily    QUEtiapine  25 mg Oral Daily    QUEtiapine  100 mg Oral Nightly    atorvastatin  40 mg Oral Daily    calcium carb-cholecalciferol  2 tablet Oral BID    lactobacillus  1 capsule Oral Daily with breakfast    gabapentin  300 mg Oral TID    sucralfate  1 g Oral 4 times per day    polyethylene glycol  17 g Oral Daily    amitriptyline  25 mg Oral Nightly    escitalopram  10 mg Oral Daily    pantoprazole  40 mg Oral BID    sodium chloride flush  5-40 mL IntraVENous 2 times per day    enoxaparin  40 mg SubCUTAneous Daily    traZODone  100 mg Oral Nightly    busPIRone  10 mg Oral BID    metoclopramide  5 mg Oral 4x Daily AC & HS       Social History:     Social History     Socioeconomic History    Marital status:      Spouse name: Not on file    Number of children: 2    Years of education: Not on file    Highest education level: Not on file   Occupational History    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   Tobacco Use    Smoking status: Former Smoker     Packs/day: 1.00     Years: 50.00     Pack years: 50.00     Types: Cigarettes    Smokeless tobacco: Never Used    Tobacco comment: quit 1 year ago    Vaping Use    Vaping Use: Former    Substances: Always   Substance and Sexual Activity    Alcohol use: No    Drug use: No    Sexual activity: Never   Other Topics Concern    Not on file   Social History Narrative    Not on file     Social Determinants of Health     Financial Resource Strain:     Difficulty of Paying Living Expenses: Not on file   Food Insecurity:     Worried About [address] in the Last Year: Not on file    [address] in the Last Year: Not on file   Transportation Needs:     Lack of Transportation (Medical): Not on file    Lack of Transportation (Non-Medical):  Not on file   Physical Activity:     Days of Exercise per Week: Not on file    Minutes of Exercise per Session: Not on file   Stress:     Feeling of Stress : Not on file   Social Connections:     Frequency of Communication with Friends and Family: Not on file    Frequency of Social Gatherings with Friends and Family: Not on file    Attends Quaker Services: Not on file    Active Member of Clubs or Organizations: Not on file    Attends Club or Organization Meetings: Not on file    Marital Status: Not on file   Intimate Partner Violence:     Fear of Current or Ex-Partner: Not on file    Emotionally Abused: Not on file    Physically Abused: Not on file    Sexually Abused: Not on file   Housing Stability:     Unable to Pay for Housing in the Last Year: Not on file    Number of [place] in the Last Year: Not on file    Unstable Housing in the Last Year: Not on file       Family History:     Family History   Problem Relation Age of Onset    Cancer Mother         uterine    Heart Attack Mother     Heart Attack Father     Other Maternal Grandfather         foot gangrene    Other Paternal Grandmother         bleeding ulcers    Other Paternal Grandfather         lung cancer      Medical Decision Making:   I have independently reviewed/ordered the following labs:    CBC with Differential:   No results for input(s): WBC, HGB, HCT, PLT, SEGSPCT, BANDSPCT, LYMPHOPCT, MONOPCT, EOSPCT in the last 72 hours. BMP:  No results for input(s): NA, K, CL, CO2, BUN, CREATININE, CA, MG in the last 72 hours. Hepatic Function Panel:   No results for input(s): PROT, LABALBU, BILIDIR, IBILI, BILITOT, ALKPHOS, ALT, AST in the last 72 hours. No results for input(s): RPR in the last 72 hours. No results for input(s): HIV in the last 72 hours. No results for input(s): BC in the last 72 hours. Lab Results   Component Value Date    CREATININE 1.13 05/06/2022    GLUCOSE 145 05/06/2022       Detailed results: Thank you for allowing us to participate in the care of this patient. Please call with questions.     This note is created with the assistance of a speech recognition program.  While intending to generate adocument that actually reflects the content of the visit, the document can still have some errors including those of syntax and sound a like substitutions which may escape proof reading. It such instances, actual meaningcan be extrapolated by contextual diversion.     Chary Burnham MD  Office: [phone]  Perfect serve / office [phone]

## 2022-05-10 ENCOUNTER — APPOINTMENT (OUTPATIENT)
Dept: INTERVENTIONAL RADIOLOGY/VASCULAR | Age: 77
DRG: 690 | End: 2022-05-10
Payer: MEDICARE

## 2022-05-10 VITALS
HEART RATE: 72 BPM | OXYGEN SATURATION: 91 % | RESPIRATION RATE: 16 BRPM | TEMPERATURE: 99.8 F | HEIGHT: 62 IN | BODY MASS INDEX: 34.32 KG/M2 | WEIGHT: 186.5 LBS | SYSTOLIC BLOOD PRESSURE: 122 MMHG | DIASTOLIC BLOOD PRESSURE: 60 MMHG

## 2022-05-10 PROCEDURE — 0JHG3XZ INSERTION OF TUNNELED VASCULAR ACCESS DEVICE INTO RIGHT LOWER ARM SUBCUTANEOUS TISSUE AND FASCIA, PERCUTANEOUS APPROACH: ICD-10-PCS | Performed by: RADIOLOGY

## 2022-05-10 PROCEDURE — 2580000003 HC RX 258: Performed by: STUDENT IN AN ORGANIZED HEALTH CARE EDUCATION/TRAINING PROGRAM

## 2022-05-10 PROCEDURE — 6360000002 HC RX W HCPCS: Performed by: STUDENT IN AN ORGANIZED HEALTH CARE EDUCATION/TRAINING PROGRAM

## 2022-05-10 PROCEDURE — 6360000002 HC RX W HCPCS: Performed by: INTERNAL MEDICINE

## 2022-05-10 PROCEDURE — 76000 FLUOROSCOPY <1 HR PHYS/QHP: CPT

## 2022-05-10 PROCEDURE — 6370000000 HC RX 637 (ALT 250 FOR IP): Performed by: STUDENT IN AN ORGANIZED HEALTH CARE EDUCATION/TRAINING PROGRAM

## 2022-05-10 PROCEDURE — 76937 US GUIDE VASCULAR ACCESS: CPT

## 2022-05-10 PROCEDURE — 6370000000 HC RX 637 (ALT 250 FOR IP): Performed by: EMERGENCY MEDICINE

## 2022-05-10 PROCEDURE — 99232 SBSQ HOSP IP/OBS MODERATE 35: CPT | Performed by: INTERNAL MEDICINE

## 2022-05-10 PROCEDURE — 36410 VNPNXR 3YR/> PHY/QHP DX/THER: CPT

## 2022-05-10 PROCEDURE — 2580000003 HC RX 258: Performed by: INTERNAL MEDICINE

## 2022-05-10 PROCEDURE — 05H733Z INSERTION OF INFUSION DEVICE INTO RIGHT AXILLARY VEIN, PERCUTANEOUS APPROACH: ICD-10-PCS | Performed by: RADIOLOGY

## 2022-05-10 PROCEDURE — 2709999900 HC NON-CHARGEABLE SUPPLY

## 2022-05-10 PROCEDURE — C1751 CATH, INF, PER/CENT/MIDLINE: HCPCS

## 2022-05-10 RX ORDER — CLOTRIMAZOLE AND BETAMETHASONE DIPROPIONATE 10; .64 MG/G; MG/G
CREAM TOPICAL 2 TIMES DAILY
Qty: 15 G | Refills: 0 | Status: SHIPPED | OUTPATIENT
Start: 2022-05-10 | End: 2022-09-13

## 2022-05-10 RX ORDER — MULTIVIT-MIN/IRON/FOLIC ACID/K 18-600-40
1 CAPSULE ORAL DAILY
Qty: 30 CAPSULE | Refills: 0 | Status: SHIPPED | OUTPATIENT
Start: 2022-05-10

## 2022-05-10 RX ADMIN — ESCITALOPRAM OXALATE 10 MG: 10 TABLET ORAL at 10:10

## 2022-05-10 RX ADMIN — SUCRALFATE 1 G: 1 TABLET ORAL at 12:53

## 2022-05-10 RX ADMIN — PANTOPRAZOLE SODIUM 40 MG: 40 TABLET, DELAYED RELEASE ORAL at 10:11

## 2022-05-10 RX ADMIN — CALCIUM CARBONATE-CHOLECALCIFEROL TAB 250 MG-125 UNIT 2 TABLET: 250-125 TAB at 10:09

## 2022-05-10 RX ADMIN — METOPROLOL SUCCINATE 25 MG: 25 TABLET, FILM COATED, EXTENDED RELEASE ORAL at 12:57

## 2022-05-10 RX ADMIN — ACETAMINOPHEN 650 MG: 325 TABLET ORAL at 10:22

## 2022-05-10 RX ADMIN — ENOXAPARIN SODIUM 40 MG: 100 INJECTION SUBCUTANEOUS at 10:10

## 2022-05-10 RX ADMIN — METOCLOPRAMIDE 5 MG: 5 TABLET ORAL at 12:53

## 2022-05-10 RX ADMIN — SODIUM CHLORIDE: 9 INJECTION, SOLUTION INTRAVENOUS at 10:16

## 2022-05-10 RX ADMIN — DESMOPRESSIN ACETATE 40 MG: 0.2 TABLET ORAL at 10:10

## 2022-05-10 RX ADMIN — QUETIAPINE FUMARATE 25 MG: 25 TABLET ORAL at 10:10

## 2022-05-10 RX ADMIN — POLYETHYLENE GLYCOL 3350 17 G: 17 POWDER, FOR SOLUTION ORAL at 10:11

## 2022-05-10 RX ADMIN — Medication 1 CAPSULE: at 10:10

## 2022-05-10 RX ADMIN — FLUTICASONE PROPIONATE 1 SPRAY: 50 SPRAY, METERED NASAL at 10:09

## 2022-05-10 RX ADMIN — BUSPIRONE HYDROCHLORIDE 10 MG: 10 TABLET ORAL at 10:10

## 2022-05-10 RX ADMIN — METOCLOPRAMIDE 5 MG: 5 TABLET ORAL at 06:22

## 2022-05-10 RX ADMIN — SODIUM CHLORIDE, PRESERVATIVE FREE 10 ML: 5 INJECTION INTRAVENOUS at 10:12

## 2022-05-10 RX ADMIN — FOLIC ACID 1 MG: 1 TABLET ORAL at 10:10

## 2022-05-10 RX ADMIN — CLONAZEPAM 0.5 MG: 0.5 TABLET ORAL at 10:24

## 2022-05-10 RX ADMIN — GUAIFENESIN DEXTROMETHORPHAN HYDROBROMIDE ORAL SOLUTION 10 ML: 200; 20 SOLUTION ORAL at 07:11

## 2022-05-10 RX ADMIN — GABAPENTIN 300 MG: 600 TABLET ORAL at 10:09

## 2022-05-10 RX ADMIN — MEROPENEM 1000 MG: 1 INJECTION, POWDER, FOR SOLUTION INTRAVENOUS at 10:17

## 2022-05-10 RX ADMIN — SUCRALFATE 1 G: 1 TABLET ORAL at 06:22

## 2022-05-10 ASSESSMENT — PAIN DESCRIPTION - LOCATION: LOCATION: ABDOMEN

## 2022-05-10 ASSESSMENT — PAIN DESCRIPTION - DESCRIPTORS: DESCRIPTORS: ACHING

## 2022-05-10 ASSESSMENT — ENCOUNTER SYMPTOMS
WHEEZING: 0
CHOKING: 0
EYE PAIN: 0
COLOR CHANGE: 0
EYE REDNESS: 0
CONSTIPATION: 1
APNEA: 0
STRIDOR: 0
VOMITING: 0
COUGH: 0
ABDOMINAL PAIN: 1
EYE DISCHARGE: 0
SHORTNESS OF BREATH: 0
NAUSEA: 0

## 2022-05-10 ASSESSMENT — PAIN - FUNCTIONAL ASSESSMENT: PAIN_FUNCTIONAL_ASSESSMENT: PREVENTS OR INTERFERES SOME ACTIVE ACTIVITIES AND ADLS

## 2022-05-10 ASSESSMENT — PAIN SCALES - GENERAL
PAINLEVEL_OUTOF10: 4
PAINLEVEL_OUTOF10: 5
PAINLEVEL_OUTOF10: 5

## 2022-05-10 NOTE — PROGRESS NOTES
OBS/CDU   RESIDENT NOTE      Patients PCP is:  Cleopatra Nguyen MD        SUBJECTIVE      Patient is 40-year-old female presented to the ER with a fever at home, patient is being treated with meropenem for UTI. Patient evaluated by infectious disease patient did well overnight, she was feeling short of breath when she arrived to the ER. Patient continues to have generalized abdominal pain however is tolerating p.o. intake. Patient is otherwise not in any acute distress. Satting well on room air. Vitals hemodynamically stable. Patient states that she wishes to leave the hospital today. PHYSICAL EXAM      General: NAD, AO X 3  Heent: EMOI, PERRL  Neck: SUPPLE, NO JVD  Cardiovascular: RRR, S1S2  Pulmonary: CTAB, NO SOB  Abdomen: Generalized abdominal tenderness  Extremities: +2/4 PULSES DISTAL, NO SWELLING  Neuro / Psych: NO NUMBNESS OR TINGLING, MENTATION AT BASELINE    PERTINENT TEST /EXAMS      I have reviewed all available laboratory results.     MEDICATIONS CURRENT   meropenem (MERREM) 1,000 mg in sodium chloride 0.9 % 100 mL IVPB (mini-bag), Q12H  bisacodyl (DULCOLAX) suppository 10 mg, Daily PRN  magnesium hydroxide (MILK OF MAGNESIA) 400 MG/5ML suspension 30 mL, Daily PRN  polyethylene glycol (GLYCOLAX) packet 17 g, Daily PRN  LORazepam (ATIVAN) tablet 0.5 mg, Q6H PRN  fluticasone (FLONASE) 50 MCG/ACT nasal spray 1 spray, Daily  morphine (PF) injection 2 mg, Q4H PRN  dextromethorphan-guaiFENesin (ROBITUSSIN-DM)  MG/5ML liquid 10 mL, Q6H PRN  benzocaine-menthol (CEPACOL SORE THROAT) lozenge 1 lozenge, 4x Daily PRN  ferrous sulfate (FE TABS 325) EC tablet 325 mg, Daily with breakfast  metoprolol succinate (TOPROL XL) extended release tablet 25 mg, Daily  folic acid (FOLVITE) tablet 1 mg, Daily  QUEtiapine (SEROQUEL) tablet 25 mg, Daily  QUEtiapine (SEROQUEL) tablet 100 mg, Nightly  atorvastatin (LIPITOR) tablet 40 mg, Daily  calcium carb-cholecalciferol 250-125 MG-UNIT per tab 2 tablet, BID  lactobacillus (CULTURELLE) capsule 1 capsule, Daily with breakfast  budesonide-formoterol (SYMBICORT) 80-4.5 MCG/ACT inhaler 2 puff, TID PRN  gabapentin (NEURONTIN) tablet 300 mg, TID  sucralfate (CARAFATE) tablet 1 g, 4 times per day  polyethylene glycol (GLYCOLAX) packet 17 g, Daily  calcium carbonate (TUMS) chewable tablet 500 mg, TID PRN  amitriptyline (ELAVIL) tablet 25 mg, Nightly  escitalopram (LEXAPRO) tablet 10 mg, Daily  clonazePAM (KLONOPIN) tablet 0.5 mg, BID PRN  pantoprazole (PROTONIX) tablet 40 mg, BID  sodium chloride flush 0.9 % injection 5-40 mL, 2 times per day  sodium chloride flush 0.9 % injection 5-40 mL, PRN  0.9 % sodium chloride infusion, PRN  enoxaparin (LOVENOX) injection 40 mg, Daily  acetaminophen (TYLENOL) tablet 650 mg, Q4H PRN  ondansetron (ZOFRAN-ODT) disintegrating tablet 4 mg, Q8H PRN   Or  ondansetron (ZOFRAN) injection 4 mg, Q6H PRN  traZODone (DESYREL) tablet 100 mg, Nightly  busPIRone (BUSPAR) tablet 10 mg, BID  metoclopramide (REGLAN) tablet 5 mg, 4x Daily AC & HS  LORazepam (ATIVAN) tablet 1 mg, Nightly PRN        All medication charted and reviewed. CONSULTS      IP CONSULT TO HOSPITALIST  IP CONSULT TO INFECTIOUS DISEASES    ASSESSMENT/PLAN       Aroldo Jim is a 68 y.o. female who presents with febrile illness of unclear etiology. Consider UTI as source however not definite as she has been on meropenem. Patient has been afebrile since this morning with a temperature of 98.2 to 98.4 °F.  Clinical symptoms have markedly improved. Patient is tolerating p.o. intake states that she wants to leave the hospital today. We will continue the patient on her Klonopin and as needed Ativan for her anxiety. Continue home medications. 1.)  Abdominal pain  -Meropenem 1 g every 12, continued per infectious disease recommendations   -Will need to be continued for a total of 10 days, started 5/8/2022 in the observation unit, received 1 dose in the emergency room.   Will plan to continue until 5/17/2022. Plan to [facility] at [facility]. Patient is feeling appropriate for discharge today. -MiraLAX and Protonix. Will add additional medications for bowel regimen as needed, titrate to bowel movement.  -Pending infectious disease consult and evaluation  -Continue home antihypertensives    Continue home medications and pain control  Monitor vitals, labs, and imaging  DISPO: pending consults and clinical improvement    --  Sarah Churchill DO  Emergency Medicine Resident Physician     This dictation was generated by voice recognition computer software. Although all attempts are made to edit the dictation for accuracy, there may be errors in the transcription that are not intended.

## 2022-05-10 NOTE — PROGRESS NOTES
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  · Many UTIs since  · Severe allergic reaction to benzathine PNC as child  · constipated    Other:  ·   Discussion / summary of stay / plan of care   ·   Recommendations   · Agree w meropenem- switch to invanz at DC -10 days AB total till 5/17-   · CTAP neg for hydro  · Laxatives for a good BM  · Ok for DC - reconsiled- midline    Infection Control Recommendations   · Nemo Precautions    Antimicrobial Stewardship Recommendations   · Simplification of therapy  · Targeted therapy      History of Present Illness:   Initial history:  Blanka Schmitz is a 68y.o.-year-old female presents w fatigue fever and lower abd pain - not feeling good -and supra pubic tenderness  U cx w GNR and started on meropenem - today not better and severe PNC allergy , ID called  case dis w Dr Mensah All -  Constipated         Interval changes  5/10/2022   Patient Vitals for the past 8 hrs:   BP Temp Temp src Pulse Resp SpO2   05/10/22 0803 124/73 99.3 °F (37.4 °C) Oral 70 18 97 %   05/10/22 0623 124/71 -- -- -- -- --     5/10:  Patient is alert, awake and oriented. Patient is afebrile and saturating well on 2 L of oxygen on NC. Patient still has burning micturition but states that it has improved since admission. Still very constipated and more meds ordered today  Dysuria better  U cx kleb w ESBL, R cipro and S macrobid  On meropenem and WBC normal  No fever    Summary of relevant labs:  Labs:  W 8.7  Micro:  U cx ,000 ESBL  klebsiella    Imaging:  CTAP neg    I have personally reviewed the past medical history, past surgical history, medications, social history, and family history, and I haveupdated the database accordingly.       Allergies:   Prednisone, Compazine [prochlorperazine maleate], Penicillin v potassium, and Penicillins     Review of Systems:     Review of Systems   Constitutional: Negative for activity change. HENT: Negative for congestion. Eyes: Negative for pain, discharge and redness. Respiratory: Negative for apnea, cough, choking, shortness of breath, wheezing and stridor. Cardiovascular: Negative for chest pain. Gastrointestinal: Positive for abdominal pain and constipation. Negative for nausea and vomiting. Endocrine: Negative for cold intolerance. Genitourinary: Positive for dysuria and flank pain. Musculoskeletal: Negative for arthralgias. Skin: Negative for color change. Allergic/Immunologic: Negative for immunocompromised state. Neurological: Negative for dizziness. Hematological: Negative for adenopathy. Psychiatric/Behavioral: Negative for agitation. Physical Examination :       Physical Exam  Constitutional:       General: She is not in acute distress. Appearance: Normal appearance. She is not ill-appearing or diaphoretic. HENT:      Head: Normocephalic and atraumatic. Nose: Nose normal.      Mouth/Throat:      Mouth: Mucous membranes are moist.   Eyes:      General: No scleral icterus. Cardiovascular:      Rate and Rhythm: Normal rate and regular rhythm. Pulses: Normal pulses. Heart sounds: Normal heart sounds. No murmur heard. Pulmonary:      Effort: Pulmonary effort is normal. No respiratory distress. Breath sounds: Normal breath sounds. Abdominal:      General: There is no distension. Palpations: Abdomen is soft. Tenderness: There is abdominal tenderness. Genitourinary:     Comments: No chu  Musculoskeletal:         General: No swelling or deformity. Cervical back: Neck supple. No rigidity or tenderness. Skin:     General: Skin is dry. Coloration: Skin is not jaundiced. Neurological:      General: No focal deficit present.       Mental Status: She is alert and oriented to person, place, and time. Mental status is at baseline. Psychiatric:         Mood and Affect: Mood normal.         Thought Content:  Thought content normal.         Past Medical History:     Past Medical History:   Diagnosis Date    Anxiety     Arthritis     Back pain     Bronchitis     Caffeine use     8 coffee / day    Carpal tunnel syndrome     Cataracts, bilateral     Constipation     COPD (chronic obstructive pulmonary disease) (HCC)     Emphysema    Depression     Diarrhea     GERD (gastroesophageal reflux disease)     H/O gastric ulcer     Hyperlipidemia     Hypertension     Mumps     MVP (mitral valve prolapse)     Dr. Ric Alcantara in May 2019    Recurrent UTI     Dr. Carolyn Vizcaino    Sinusitis     Trache[facility] in place [facility])     Ulcerative esophagitis     Wellness examination     Dr. Dolly Dasilva seen in Jan 2020       Past Surgical  History:     Past Surgical History:   Procedure Laterality Date    APPENDECTOMY      CARPAL TUNNEL RELEASE      CHOLECYSTECTOMY, LAPAROSCOPIC N/A 05/22/2020    XI LAPAROSCOPIC ROBOTIC CHOLECYSTECTOMY, PYLOROPLASTY performed by Yamil Ramos MD at [address] [facility] N/A 1/28/2022    COLONOSCOPY POLYPECTOMY HOT performed by Ge Mckeon MD at [address] ERCP  05/21/2020    with stent insertion, balloon dilation, sphinctereotomy    ERCP  05/21/2020    ** CASE IN OR WITY GI STAFF** ERCP STENT INSERTION performed by Ge Mckeon MD at [facility]    ERCP  05/21/2020    ** CASE IN OR WITH GI STAFF**ERCP SPHINCTER/PAPILLOTOMY performed by Ge Mckeon MD at [facility]    ERCP  05/21/2020    ** CASE IN OR WITH GI STAFF**ERCP DILATION BALLOON performed by Ge Mckeon MD at [facility]    ERCP N/A 2/8/2021    ERCP STENT REMOVAL performed by Nick Friedman MD at [facility]    ERCP  2/8/2021    ERCP STONE REMOVAL performed by Nick Friedman MD at [address] [facility] patricia IOL    GASTRIC FUNDOPLICATION N/A 68/86/8335    XI LAPAROSCOPIC ROBOTIC HIATAL HERNIA REPAIR, CHERYL FUNDOPLICATION performed by Rosa Breaux MD at [address] [facility] N/A 03/27/2020    EGD PEG TUBE PLACEMENT performed by Rosa Breaux MD at [address] [facility] N/A 2/8/2021    **CASE IN O.R. W/ GI STAFF - EGD WITH REMOVAL PEG TUBE performed by Danita Capps MD at [facility]    HAND SURGERY      [facility] CATH POWER PICC TRIPLE  03/04/2020         HERNIA REPAIR      Hiatal hernia    HYSTERECTOMY      Abdominal    JOINT REPLACEMENT Right     right hip    OVARY REMOVAL      RHINOPLASTY      TONSILLECTOMY      TOTAL HIP ARTHROPLASTY      TOTAL NEPHRECTOMY      atrophic from infections, age 13   [place] TRACHEOSTOMY N/A 03/27/2020    **ADD ON **WANTS 10:00AM **TRACHEOTOMY performed by Rosa Breaux MD at [address] [facility] 04/02/2020    TRACHEOTOMY EXCHANGE performed by Rosa Breaux MD at [address] [place] [address] N/A 03/17/2020    BEDSIDE EGD ESOPHAGOGASTRODUODENOSCOPY (ICU) performed by Rosa Breaux MD at [address] [place] [address] N/A 05/18/2020    EGD BIOPSY performed by Renan Kramer MD at [address] [place] [address]  05/18/2020    EGD DILATION BALLOON performed by Renan Kramer MD at [address] [place] [address] N/A 07/06/2020    ** CASE IN O.R. WITH GI STAFF** EGD ESOPHAGOGASTRODUODENOSCOPY performed by Elisabet Dasilva MD at [address] [place] [address] 11/09/2020    EGD DIAGNOSTIC ONLY performed by Waldron Merlin, MD at [address] [place] [address]  02/08/2021    - EGD WITH REMOVAL PEG TUBE,ERCP STENT REMOVAL,ERCP STONE REMOVAL    UPPER GASTROINTESTINAL ENDOSCOPY N/A 11/24/2021    EGD BIOPSY performed by Waldron Merlin, MD at [facility]       Medications:      meropenem 1,000 mg IntraVENous Q12H    fluticasone  1 spray Each Nostril Daily    ferrous sulfate  325 mg Oral Daily with breakfast    metoprolol succinate  25 mg Oral Daily    folic acid  1 mg Oral Daily    QUEtiapine  25 mg Oral Daily    QUEtiapine  100 mg Oral Nightly    atorvastatin  40 mg Oral Daily    calcium carb-cholecalciferol  2 tablet Oral BID    lactobacillus  1 capsule Oral Daily with breakfast    gabapentin  300 mg Oral TID    sucralfate  1 g Oral 4 times per day    polyethylene glycol  17 g Oral Daily    amitriptyline  25 mg Oral Nightly    escitalopram  10 mg Oral Daily    pantoprazole  40 mg Oral BID    sodium chloride flush  5-40 mL IntraVENous 2 times per day    enoxaparin  40 mg SubCUTAneous Daily    traZODone  100 mg Oral Nightly    busPIRone  10 mg Oral BID    metoclopramide  5 mg Oral 4x Daily AC & HS       Social History:     Social History     Socioeconomic History    Marital status:      Spouse name: Not on file    Number of children: 2    Years of education: Not on file    Highest education level: Not on file   Occupational History    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   Tobacco Use    Smoking status: Former Smoker     Packs/day: 1.00     Years: 50.00     Pack years: 50.00     Types: Cigarettes    Smokeless tobacco: Never Used    Tobacco comment: quit 1 year ago    Vaping Use    Vaping Use: Former    Substances: Always   Substance and Sexual Activity    Alcohol use: No    Drug use: No    Sexual activity: Never   Other Topics Concern    Not on file   Social History Narrative    Not on file     Social Determinants of Health     Financial Resource Strain:     Difficulty of Paying Living Expenses: Not on file   Food Insecurity:     Worried About [address] in the Last Year: Not on file    [address] [facility] [address] in the Last Year: Not on file   Transportation Needs:     Lack of Transportation (Medical): Not on file    Lack of Transportation (Non-Medical):  Not on file Physical Activity:     Days of Exercise per Week: Not on file    Minutes of Exercise per Session: Not on file   Stress:     Feeling of Stress : Not on file   Social Connections:     Frequency of Communication with Friends and Family: Not on file    Frequency of Social Gatherings with Friends and Family: Not on file    Attends Bahai Services: Not on file    Active Member of [address] [place] [address] [employer] or Organizations: Not on file    Attends Club or Organization Meetings: Not on file    Marital Status: Not on file   Intimate Partner Violence:     Fear of Current or Ex-Partner: Not on file    Emotionally Abused: Not on file    Physically Abused: Not on file    Sexually Abused: Not on file   Housing Stability:     Unable to Pay for Housing in the Last Year: Not on file    Number of [place] in the Last Year: Not on file    Unstable Housing in the Last Year: Not on file       Family History:     Family History   Problem Relation Age of Onset    Cancer Mother         uterine    Heart Attack Mother     Heart Attack Father     Other Maternal Grandfather         foot gangrene    Other Paternal Grandmother         bleeding ulcers    Other Paternal Grandfather         lung cancer      Medical Decision Making:   I have independently reviewed/ordered the following labs:    CBC with Differential:   No results for input(s): WBC, HGB, HCT, PLT, SEGSPCT, BANDSPCT, LYMPHOPCT, MONOPCT, EOSPCT in the last 72 hours. BMP:  No results for input(s): NA, K, CL, CO2, BUN, CREATININE, CA, MG in the last 72 hours. Hepatic Function Panel:   No results for input(s): PROT, LABALBU, BILIDIR, IBILI, BILITOT, ALKPHOS, ALT, AST in the last 72 hours. No results for input(s): RPR in the last 72 hours. No results for input(s): HIV in the last 72 hours. No results for input(s): BC in the last 72 hours. Lab Results   Component Value Date    CREATININE 1.13 05/06/2022    GLUCOSE 145 05/06/2022       Detailed results:         Thank you for allowing us to participate in the care of this patient. Please call with questions. This note is created with the assistance of a speech recognition program.  While intending to generate adocument that actually reflects the content of the visit, the document can still have some errors including those of syntax and sound a like substitutions which may escape proof reading. It such instances, actual meaningcan be extrapolated by contextual diversion. Annette Minor MD  Office: [phone]  Perfect serve / office [phone]          I have discussed the care of the patient, including pertinent history and exam findings,  with the resident. I have seen and examined the patient and the key elements of all parts of the encounter have been performed by me. I agree with the assessment, plan and orders as documented by the resident.     Philly Mcneil, Infectious Diseases

## 2022-05-10 NOTE — PLAN OF CARE
Problem: Skin/Tissue Integrity  Goal: Absence of new skin breakdown  Description: 1. Monitor for areas of redness and/or skin breakdown  2. Assess vascular access sites hourly  3. Every 4-6 hours minimum:  Change oxygen saturation probe site  4. Every 4-6 hours:  If on nasal continuous positive airway pressure, respiratory therapy assess nares and determine need for appliance change or resting period.   5/10/2022 0406 by Alexus Stearns RN  Outcome: Progressing  5/10/2022 0406 by Alexus Stearns RN  Outcome: Progressing  5/9/2022 1831 by Eleonora Baron RN  Outcome: Progressing     Problem: Safety - Adult  Goal: Free from fall injury  5/10/2022 0406 by Alexus Stearns RN  Outcome: Progressing  5/10/2022 0406 by Alexus Stearns RN  Outcome: Progressing  5/9/2022 1831 by Eleonora Baron RN  Outcome: Progressing     Problem: Pain  Goal: Verbalizes/displays adequate comfort level or baseline comfort level  5/10/2022 0406 by Alexus Stearns RN  Outcome: Progressing  5/10/2022 0406 by Alexus Stearns RN  Outcome: Progressing  5/9/2022 1831 by Eleonora Baron RN  Outcome: Progressing     Problem: ABCDS Injury Assessment  Goal: Absence of physical injury  5/10/2022 0406 by Alexus Stearns RN  Outcome: Progressing  5/10/2022 0406 by Alexus Stearns RN  Outcome: Progressing

## 2022-05-10 NOTE — DISCHARGE SUMMARY
CDU Discharge Summary        Patient:  Laron Wolfe  YOB: 1945    MRN: [id number]   Acct: [de-identified]    Primary Care Physician: Rachel Tee MD    Admit date:  5/6/2022 10:01 AM  Discharge date: 5/10/2022  2:20 PM     Discharge Diagnoses:     Acute fevers myalgias abdominal pain due to urinary tract infection  Improved with antibiotics and pain control    Follow-up:  Call today/tomorrow for a follow up appointment with Rachel Tee MD , or return to the Emergency Room with worsening symptoms    Stressed to patient the importance of following up with primary care doctor for further workup/management of symptoms. Pt verbalizes understanding and agrees with plan. Discharge Medications:  Changes to medications            Medication List      START taking these medications    ertapenem  infusion  Commonly known as: INVanz  Infuse 1,000 mg intravenously every 24 hours for 8 days Compound per protocol. meropenem  infusion  Commonly known as: MERREM  Infuse 1,000 mg intravenously every 12 hours for 7 days Compound per protocol. CHANGE how you take these medications    polyethylene glycol 17 g packet  Commonly known as: GLYCOLAX  Take 17 g by mouth daily as needed for Constipation  What changed: Another medication with the same name was removed. Continue taking this medication, and follow the directions you see here.      vitamin D 50 MCG (2000 UT) Caps capsule  Take 1 capsule by mouth daily  What changed: when to take this        CONTINUE taking these medications    amitriptyline 25 MG tablet  Commonly known as: ELAVIL  Take 1 tablet by mouth nightly     * bisacodyl 10 MG suppository  Commonly known as: DULCOLAX     * bisacodyl 5 MG EC tablet  Commonly known as: DULCOLAX  TAKE 2 TABS AS INSTRUCTED BY THE OFFICE THE DAY PRIOR TO COLONOSCOPY     busPIRone 10 MG tablet  Commonly known as: BUSPAR  Take 1 tablet by mouth 2 times daily     calcium carbonate 500 MG chewable tablet  Commonly known as: TUMS     Claritin 10 MG capsule  Generic drug: loratadine     clonazePAM 0.5 MG tablet  Commonly known as: KLONOPIN     clotrimazole-betamethasone 1-0.05 % cream  Commonly known as: Lotrisone  Apply topically 2 times daily Apply topically 2 times daily. ferrous sulfate 325 (65 Fe) MG EC tablet  Commonly known as: FE TABS 325  Take 1 tablet by mouth daily (with breakfast)     fleet 7-19 GM/118ML     fluticasone 50 MCG/ACT nasal spray  Commonly known as: FLONASE     Folic Acid-Cholecalciferol 1-2000 MG-UNIT Tabs     gabapentin 300 MG capsule  Commonly known as: NEURONTIN  Take 1 capsule by mouth 3 times daily for 3 days. guaiFENesin 600 MG extended release tablet  Commonly known as: MUCINEX     Lactobacillus Tabs     Lexapro 10 MG tablet  Generic drug: escitalopram     metoclopramide 5 MG tablet  Commonly known as: Reglan  Take 1 tablet by mouth 4 times daily     metoprolol succinate 25 MG extended release tablet  Commonly known as: TOPROL XL  Take 1 tablet by mouth daily     Oyster Shell 500 MG Tabs     pantoprazole 40 MG tablet  Commonly known as: PROTONIX  Take 1 tablet by mouth 2 times daily     * QUEtiapine 100 MG tablet  Commonly known as: SEROQUEL  Take 1 tablet by mouth nightly for 3 days     * QUEtiapine 25 MG tablet  Commonly known as: SEROQUEL  Take 1 tablet by mouth every morning (before breakfast) for 3 days     simvastatin 40 MG tablet  Commonly known as: ZOCOR     sucralfate 1 GM tablet  Commonly known as: CARAFATE  Take 1 tablet by mouth 4 times daily     Symbicort 160-4.5 MCG/ACT Aero  Generic drug: budesonide-formoterol         * This list has 4 medication(s) that are the same as other medications prescribed for you. Read the directions carefully, and ask your doctor or other care provider to review them with you.             STOP taking these medications    LORazepam 1 MG tablet  Commonly known as: ATIVAN     traZODone 100 MG tablet  Commonly known as: DESYREL           Where to Get Your Medications      These medications were sent to [facility] [address], [address]  [facility] [address]    Phone: [phone]   · clotrimazole-betamethasone 1-0.05 % cream  · vitamin D 50 MCG (2000 UT) Caps capsule     You can get these medications from any pharmacy    Bring a paper prescription for each of these medications  · ertapenem  infusion  · meropenem  infusion         Diet:  ADULT DIET; Regular , Advance as tolerated     Activity:  As tolerated    Consultants: IP CONSULT TO HOSPITALIST  IP CONSULT TO INFECTIOUS DISEASES    Procedures:  Not indicated     Diagnostic Test:   Results for orders placed or performed during the hospital encounter of 05/06/22   Culture, Urine    Specimen: Urine   Result Value Ref Range    Specimen Description . URINE     Culture (A)      KLEBSIELLA PNEUMONIAE >828039 CFU/ML This organism is an Extended Spectrum Beta Lactamase (ESBL)  and resistance to therapy with Penicillins, Cephalosporins and Aztreonam is expected. These organisms generally remain susceptible to Carbapenems. Consider ID Consultation. CONTACT PRECAUTIONS INDICATED. Susceptibility    Klebsiella pneumoniae - BACTERIAL SUSCEPTIBILITY PANEL MOHAN     ampicillin >=32 Resistant      aztreonam  Resistant      ceFAZolin* >=64 Resistant       * Cefazolin sensitivity results can be used to predict the effectiveness of oral cephalosporins (eg.  Cephalexin) in uncomplicated Urinary Tract Infections due to E. coli, K. pneumoniae, and P. mirabilis     cefepime 2 Sensitive      cefTRIAXone >=64 Resistant      ciprofloxacin >=4 Resistant      Confirmatory Extended Spectrum Beta-Lactamase POSITIVE Positive      gentamicin <=1 Sensitive      meropenem <=0.25 Sensitive      nitrofurantoin 32 Sensitive      tobramycin >=16 Resistant      trimethoprim-sulfamethoxazole <=20 Sensitive      piperacillin-tazobactam 16 Resistant    RAPID INFLUENZA A/B ANTIGENS    Specimen: Nasopharyngeal   Result Value Ref Range    Flu A Antigen NEGATIVE NEGATIVE    Flu B Antigen NEGATIVE NEGATIVE   Culture, Blood 2    Specimen: Blood   Result Value Ref Range    Specimen Description . BLOOD     Special Requests  LAC 20ML     Culture NO GROWTH 4 DAYS    Culture, Blood 1    Specimen: Blood   Result Value Ref Range    Specimen Description . BLOOD     Special Requests RFA 20ML     Culture NO GROWTH 4 DAYS    SARS-CoV-2 NAAT (Rapid)    Specimen: Nasopharyngeal Swab   Result Value Ref Range    Specimen Description . NASOPHARYNGEAL SWAB     SARS-CoV-2, Rapid Not Detected Not Detected   CBC with Auto Differential   Result Value Ref Range    WBC 8.7 3.5 - 11.3 k/uL    RBC 5.03 3.95 - 5.11 m/uL    Hemoglobin 14.2 11.9 - 15.1 g/dL    Hematocrit 45.0 36.3 - 47.1 %    MCV 89.5 82.6 - 102.9 fL    MCH 28.2 25.2 - 33.5 pg    MCHC 31.6 28.4 - 34.8 g/dL    RDW 15.5 (H) 11.8 - 14.4 %    Platelets 853 597 - 524 k/uL    MPV 11.1 8.1 - 13.5 fL    NRBC Automated 0.0 0.0 per 100 WBC    Immature Granulocytes 1 (H) 0 %    Seg Neutrophils 82 (H) 36 - 65 %    Lymphocytes 7 (L) 24 - 43 %    Monocytes 7 3 - 12 %    Eosinophils % 3 1 - 4 %    Basophils 0 0 - 2 %    Absolute Immature Granulocyte 0.09 0.00 - 0.30 k/uL    Segs Absolute 7.13 1.50 - 8.10 k/uL    Absolute Lymph # 0.61 (L) 1.10 - 3.70 k/uL    Absolute Mono # 0.61 0.10 - 1.20 k/uL    Absolute Eos # 0.26 0.00 - 0.44 k/uL    Basophils Absolute 0.00 0.00 - 0.20 k/uL    Morphology ANISOCYTOSIS PRESENT    Comprehensive Metabolic Panel w/ Reflex to MG   Result Value Ref Range    Glucose 145 (H) 70 - 99 mg/dL    BUN 16 8 - 23 mg/dL    CREATININE 1.13 (H) 0.50 - 0.90 mg/dL    Calcium 9.1 8.6 - 10.4 mg/dL    Sodium 136 135 - 144 mmol/L    Potassium 4.0 3.7 - 5.3 mmol/L    Chloride 101 98 - 107 mmol/L    CO2 23 20 - 31 mmol/L    Anion Gap 12 9 - 17 mmol/L    Alkaline Phosphatase 118 (H) 35 - 104 U/L    ALT 21 5 - 33 U/L    AST 24 <32 U/L    Total Bilirubin 0.30 0.3 - 1.2 mg/dL    Total Protein 6.4 6.4 - 8.3 g/dL    Albumin 3.9 3.5 - 5.2 g/dL    Albumin/Globulin Ratio 1.6 1.0 - 2.5    GFR Non- 47 (L) >60 mL/min    GFR  57 (L) >60 mL/min    GFR Comment         Lipase   Result Value Ref Range    Lipase 17 13 - 60 U/L   Troponin   Result Value Ref Range    Troponin, High Sensitivity 19 (H) 0 - 14 ng/L   Brain Natriuretic Peptide   Result Value Ref Range    Pro- <300 pg/mL   Urinalysis with Microscopic   Result Value Ref Range    Color, UA Yellow Yellow    Turbidity UA Clear Clear    Glucose, Ur NEGATIVE NEGATIVE    Bilirubin Urine NEGATIVE NEGATIVE    Ketones, Urine NEGATIVE NEGATIVE    Specific Gravity, UA 1.015 1.005 - 1.030    Urine Hgb NEGATIVE NEGATIVE    pH, UA 7.0 5.0 - 8.0    Protein, UA NEGATIVE NEGATIVE    Urobilinogen, Urine Normal Normal    Nitrite, Urine NEGATIVE NEGATIVE    Leukocyte Esterase, Urine SMALL (A) NEGATIVE    -          WBC, UA 2 TO 5 0 - 5 /HPF    RBC, UA 20 TO 50 0 - 4 /HPF    Epithelial Cells UA None 0 - 5 /HPF    Bacteria, UA MANY (A) None   Lactate, Sepsis   Result Value Ref Range    Lactic Acid, Sepsis, Whole Blood 1.8 0.5 - 1.9 mmol/L   Lactate, Sepsis   Result Value Ref Range    Lactic Acid, Sepsis, Whole Blood 2.7 (H) 0.5 - 1.9 mmol/L   Protime-INR   Result Value Ref Range    Protime 10.9 9.1 - 12.3 sec    INR 1.0    Lactic Acid   Result Value Ref Range    Lactic Acid, Whole Blood 1.9 0.7 - 2.1 mmol/L   EKG 12 Lead   Result Value Ref Range    Ventricular Rate 79 BPM    Atrial Rate 79 BPM    P-R Interval 148 ms    QRS Duration 78 ms    Q-T Interval 382 ms    QTc Calculation (Bazett) 438 ms    P Axis 35 degrees    R Axis 39 degrees    T Axis 26 degrees     XR ACUTE ABD SERIES CHEST 1 VW    Result Date: 5/8/2022  EXAMINATION: TWO XRAY VIEWS OF THE ABDOMEN AND SINGLE  XRAY VIEW OF THE CHEST 5/8/2022 11:31 am COMPARISON: Chest radiograph 05/06/2022. CT abdomen and pelvis 05/06/2022.  HISTORY: ORDERING SYSTEM PROVIDED HISTORY: abdominal pain TECHNOLOGIST PROVIDED HISTORY: abdominal pain FINDINGS: The cardiomediastinal silhouette is unchanged. No pneumothorax, vascular congestion, consolidation, or pleural effusion is identified. Nonobstructive bowel gas pattern. Mildly increased stool in the colon. No evidence of pneumoperitoneum. No acute osseous abnormality. 1. No acute process in the chest. 2. Nonobstructive bowel gas pattern. Increased stool in the colon compatible with constipation. CT ABDOMEN PELVIS W IV CONTRAST Additional Contrast? None    Result Date: 5/6/2022  EXAMINATION: CT OF THE ABDOMEN AND PELVIS WITH CONTRAST 5/6/2022 11:31 am TECHNIQUE: CT of the abdomen and pelvis was performed with the administration of intravenous contrast. Multiplanar reformatted images are provided for review. Dose modulation, iterative reconstruction, and/or weight based adjustment of the mA/kV was utilized to reduce the radiation dose to as low as reasonably achievable. COMPARISON: December 18, 2021 HISTORY: ORDERING SYSTEM PROVIDED HISTORY:  Eval for diverticulitis TECHNOLOGIST PROVIDED HISTORY: Eval for diverticulitis Decision Support Exception - unselect if not a suspected or confirmed emergency medical condition->Emergency Medical Condition (MA) Reason for Exam:  Eval for diverticulitis FINDINGS: Lower Chest: Again demonstrated are the ground-glass opacities in the lingula and right middle lobe probably sequelae of prior infection/inflammatory process. Dilated fluid-filled distal esophagus extending to a small hiatal hernia also redemonstrated. Organs: Hepatic steatosis is present. No focal liver lesion. The gallbladder is surgically absent. No significant biliary ductal dilatation. The spleen is normal in size without focal lesion. The pancreas and the adrenal glands are unremarkable. 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 No left hydronephrosis. 11 mm left lower pole renal cyst redemonstrated.   No concerning renal lesion. GI/Bowel: The patient has had prior appendectomy. No bowel obstruction. Pelvis: Remote hysterectomy. The urinary bladder is unremarkable. Peritoneum/Retroperitoneum: No bulky lymphadenopathy. No ascites. Bones/Soft Tissues: Partially visualized right total hip arthroplasty appears intact. No acute osseous abnormality. No acute process in the abdomen or pelvis. XR CHEST PORTABLE    Result Date: 5/6/2022  EXAMINATION: ONE XRAY VIEW OF THE CHEST 5/6/2022 11:15 am COMPARISON: None. HISTORY: ORDERING SYSTEM PROVIDED HISTORY: eval for pmn TECHNOLOGIST PROVIDED HISTORY: eval for pmn FINDINGS: No acute airspace infiltrates. No pneumothorax or pleural effusion. Normal cardiomediastinal silhouette     No acute cardiopulmonary disease           Physical Exam:    General appearance - NAD, AOx 3   Lungs -CTAB, no R/R/R  Heart - RRR, no M/R/G  Abdomen - Soft, NT/ND  Neurological:  MAEx4, No focal motor deficit, sensory loss  Extremities - Cap refil <2 sec in all ext., no edema  Skin -warm, dry      Hospital Course:  Clinical course has improved, labs and imaging reviewed. Jesica Chicas originally presented to the hospital on 5/6/2022 10:01 AM. with fevers and urinary tract infection. At that time it was determined that She required further observation and evaluation by infectious disease. Patient was treated with meropenem for urinary tract infection that was complicated by resistant strains. Patient will require total of 10 days of meropenem twice daily until 17 May. Patient clinically improved, she was not in any acute distress. Patient was appropriate for discharge to external care facility for continued antibiotics and monitoring. Matt Henriquez She was admitted and labs and imaging were followed daily. Imaging results as above. She is medically stable to be discharged.        Disposition: Home    Patient stated that they will not drive themselves home from the hospital if they have gotten pain killers/ narcotics earlier that day and that they will arrange for transportation on their own or work with the  for a ride. Patient counseled NOT to drive while under the influence of narcotics/ pain killers. Condition: Good    Patient stable and ready for discharge home. I have discussed plan of care with patient and they are in understanding. They were instructed to read discharge paperwork. All of their questions and concerns were addressed. Time Spent: 2 day      --  Linette Rojas, DO  Emergency Medicine Resident Physician    This dictation was generated by voice recognition computer software. Although all attempts are made to edit the dictation for accuracy, there may be errors in the transcription that are not intended.

## 2022-05-10 NOTE — PROGRESS NOTES
Occupational [address]  Occupational Therapy Not Seen Note    DATE: 5/10/2022    NAME: Cheryl Latham  MRN: [id number]   : 1945      Patient not seen this date for Occupational Therapy due to:      Pt off the floor at IR for midline placement at this time       Next Scheduled Treatment: Will check back PM if able or 2022    Electronically signed by Terrell Abarca OT on 5/10/2022 at 11:14 AM

## 2022-05-10 NOTE — PLAN OF CARE
Problem: Skin/Tissue Integrity  Goal: Absence of new skin breakdown  Description: 1. Monitor for areas of redness and/or skin breakdown  2. Assess vascular access sites hourly  3. Every 4-6 hours minimum:  Change oxygen saturation probe site  4. Every 4-6 hours:  If on nasal continuous positive airway pressure, respiratory therapy assess nares and determine need for appliance change or resting period.   5/10/2022 1255 by Mario Avila RN  Outcome: Completed  5/10/2022 0406 by Maribel Bautista RN  Outcome: Progressing  5/10/2022 0406 by Maribel Bautista RN  Outcome: Progressing     Problem: Safety - Adult  Goal: Free from fall injury  5/10/2022 1255 by Mario Avila RN  Outcome: Completed  5/10/2022 0406 by Maribel Bautista RN  Outcome: Progressing  5/10/2022 0406 by Maribel Bautista RN  Outcome: Progressing     Problem: Pain  Goal: Verbalizes/displays adequate comfort level or baseline comfort level  5/10/2022 1255 by Mario Avila RN  Outcome: Completed  5/10/2022 0406 by Maribel Bautista RN  Outcome: Progressing  5/10/2022 0406 by Maribel Bautista RN  Outcome: Progressing     Problem: ABCDS Injury Assessment  Goal: Absence of physical injury  5/10/2022 1255 by Mario Avila RN  Outcome: Completed  Flowsheets (Taken 5/10/2022 0800)  Absence of Physical Injury: Implement safety measures based on patient assessment  5/10/2022 0406 by Maribel Bautista RN  Outcome: Progressing  5/10/2022 0406 by Maribel Bautista RN  Outcome: Progressing

## 2022-05-10 NOTE — PROGRESS NOTES
Physical Therapy        Physical Therapy Cancel Note      DATE: 5/10/2022    NAME: Robb Mckeon  MRN: [id number]   : 1945      Patient not seen this date for Physical Therapy due to:    Surgery/Procedure: IR      Electronically signed by Amando Mcclure PT on [id number] at 10:56 AM

## 2022-05-10 NOTE — DISCHARGE INSTR - COC
Continuity of Care Form    Patient Name: Diana Shah   :  1945  MRN:  [id number]    Admit date:  2022  Discharge date:  5/10/2022    Code Status Order: Full Code   Advance Directives:      Admitting Physician:  Asuncion Egan MD  PCP: Jason Pemberton MD    Discharging Nurse:  Gary Guzman RN  Discharging Hospital Unit/Room#: 3856/8569-55  Discharging Unit Phone Number: [phone]    Emergency Contact:   Extended Emergency Contact Information  Primary Emergency Contact: Hamilton Ramirez  Home Phone: [phone]  Relation: Child    Past Surgical History:  Past Surgical History:   Procedure Laterality Date    APPENDECTOMY      CARPAL TUNNEL RELEASE      CHOLECYSTECTOMY, LAPAROSCOPIC N/A 2020    XI LAPAROSCOPIC ROBOTIC CHOLECYSTECTOMY, PYLOROPLASTY performed by Kellie Polo MD at [place] [address]      COLONOSCOPY N/A 2022    COLONOSCOPY POLYPECTOMY HOT performed by Vinay Madison MD at [place] [address]      ERCP  2020    with stent insertion, balloon dilation, sphinctereotomy    ERCP  2020    ** CASE IN OR WITY GI STAFF** ERCP STENT INSERTION performed by Vinay Madison MD at [facility]    ERCP  2020    ** CASE IN OR WITH GI STAFF**ERCP SPHINCTER/PAPILLOTOMY performed by Vinay Madison MD at [facility]    ERCP  2020    ** CASE IN OR WITH GI STAFF**ERCP DILATION BALLOON performed by Vinay Madison MD at [facility]    ERCP N/A 2021    ERCP STENT REMOVAL performed by Eder Banegas MD at [facility]    ERCP  2021    ERCP STONE REMOVAL performed by Eder Banegas MD at [address] [place] [address]    GASTRIC FUNDOPLICATION N/A     XI LAPAROSCOPIC ROBOTIC [address], CHERYL FUNDOPLICATION performed by Kellie Polo MD at [address] 2020    EGD PEG TUBE PLACEMENT performed by [facility]o MD [facility] [address] [facility] 2021    **CASE IN O.R. W/ GI STAFF - EGD WITH REMOVAL PEG TUBE performed by Beth Ferraro MD at hospitals [facility]    HAND SURGERY      [facility] [place]. CATH POWER PICC TRIPLE  03/04/2020         HERNIA REPAIR      Hiatal hernia    HYSTERECTOMY      Abdominal    JOINT REPLACEMENT Right     right hip    OVARY REMOVAL      RHINOPLASTY      TONSILLECTOMY      TOTAL HIP ARTHROPLASTY      TOTAL NEPHRECTOMY      atrophic from infections, age 13    TRACHEOSTOMY N/A 03/27/2020    **ADD ON **WANTS 10:00AM **TRACHEOTOMY performed by Hiram Calixto MD at [facility] N/A 04/02/2020    TRACHEOTOMY EXCHANGE performed by Hiram Calixto MD at [facility] 03/17/2020    BEDSIDE EGD ESOPHAGOGASTRODUODENOSCOPY (ICU) performed by Hiram Calixto MD at [address] [place] [address],[facility] 05/18/2020    EGD BIOPSY performed by Rodo Guerra MD at [address] [place] [address],[facility]  05/18/2020    EGD DILATION BALLOON performed by Rodo Guerra MD at [address] [place] [address],[facility] N/A 07/06/2020    ** CASE IN O.R. WITH GI STAFF** EGD ESOPHAGOGASTRODUODENOSCOPY performed by Uriel Solis MD at [address] [place] [address],[facility] 11/09/2020    EGD DIAGNOSTIC ONLY performed by Connie Shi MD at [address] [place] [address],[facility]  02/08/2021    - EGD WITH REMOVAL PEG TUBE,ERCP STENT REMOVAL,ERCP STONE REMOVAL    UPPER GASTROINTESTINAL ENDOSCOPY N/A 11/24/2021    EGD BIOPSY performed by Connie Shi MD at hospitals [facility]       Immunization History:   Immunization History   Administered Date(s) Administered    COVID-19, Carli Irby, Primary or Immunocompromised, PF, 100mcg/0.5mL 01/07/2021, 02/04/2021, 12/25/2021       Active Problems:  Patient Active Problem List   Diagnosis Code    Frequency of urination R35.0    Backache M54.9    GERD (gastroesophageal reflux disease) K21.9    MVP (mitral valve prolapse) I34.1 Depression F32. A    Anxiety F41.9    BENEDICT (acute kidney injury) ([facility] N17.9    Dysphagia R13.10    Hiatal hernia K44.9    History of repair of hiatal hernia Z98.890, Z87.19    Centrilobular emphysema ([facility]) J43.2    Esophageal dilatation K22.89    Shock ([facility]) R57.9    Fever R50.9    Critical illness polyneuropathy ([facility]) G62.81    Gastric outlet obstruction K31.1    Recurrent UTI N39.0    Tracheostomy in place ([facility] Z93.0    H/O gastric ulcer Z87.11    Hyperlipidemia E78.5    Essential hypertension I10    Tobacco abuse Z72.0    Chronic respiratory failure with hypoxia ([facility]) J96.11    S/P percutaneous endoscopic gastrostomy (PEG) tube placement ([facility]) Z93.1    S/P Nissen fundoplication (without gastrostomy tube) procedure Z98.890    Anemia due to blood loss D50.0    Phlebitis after infusion T80. 1XXA, I80.9    Esophagitis determined by endoscopy K20.90    Expressive aphasia R47.01    Transient speech disturbance R47.9    Speech disturbance R47.9    Coffee ground emesis K92.0    Acute cystitis without hematuria N30.00    Ulcerative esophagitis K22.10    Lower abdominal pain R10.30    Ileus ([facility]) K56.7    Chronic pain G89.29    Dyspnea on exertion R06.00    Orthostatic hypotension I95.1    Stage 3 chronic kidney disease ([facility]) N18.30    Difficulty walking R26.2    Class 1 obesity in adult E66.9    Generalized abdominal pain R10.84    Multifocal pneumonia J18.9    Other constipation K59.09    UTI (urinary tract infection) N39.0    Intractable abdominal pain R10.9       Isolation/Infection:   Isolation            Contact  Contact          Patient Infection Status       Infection Onset Added Last Indicated Last Indicated By Review Planned Expiration Resolved Resolved By    ESBL (Extended Spectrum Beta Lactamase) 12/13/20 12/15/20 05/06/22 Culture, Urine        Klebsiella urine 5/2022      MDRO (multi-drug resistant organism) 12/13/20 12/15/20 05/06/22 Culture, Urine        Proteus Urine 2/2021  Klebsiella Urine 5/2022 Nurse Assessment:  Last Vital Signs: /73   Pulse 70   Temp 99.3 °F (37.4 °C) (Oral)   Resp 18   Ht 5' 2\" (1.575 m)   Wt 186 lb 8 oz (84.6 kg)   SpO2 91%   BMI 34.11 kg/m²     Last documented pain score (0-10 scale): Pain Level: 5  Last Weight:   Wt Readings from Last 1 Encounters:   05/06/22 186 lb 8 oz (84.6 kg)     Mental Status:  oriented and alert    IV Access:  Midline, Right upper arm, inserted 05/10/2022    Nursing Mobility/ADLs:  Walking   Dependent  Transfer  Dependent  Bathing  Assisted  Dressing  Assisted  Toileting  Assisted  Feeding  Assisted  Med Admin  Assisted  Med Delivery   prefers mixed with chocolate pudding, whole    Wound Care Documentation and Therapy:  Wound 07/06/20 Coccyx Stage 1 (Active)   Number of days: 672        Elimination:  Continence: Bowel: No  Bladder: No  Urinary Catheter: None   Colostomy/Ileostomy/Ileal Conduit: No       Date of Last BM: 05/09/2022    Intake/Output Summary (Last 24 hours) at 5/10/2022 1047  Last data filed at 5/10/2022 1042  Gross per 24 hour   Intake 261.39 ml   Output 1000 ml   Net -738.61 ml     I/O last 3 completed shifts: In: 251.4 [IV Piggyback:251.4]  Out: -     Safety Concerns: At Risk for Falls    Impairments/Disabilities:      None    Nutrition Therapy:  Current Nutrition Therapy:   - Oral Diet:  General    Routes of Feeding: Oral  Liquids: No Restrictions  Daily Fluid Restriction: no  Last Modified Barium Swallow with Video (Video Swallowing Test): not done    Treatments at the Time of Hospital Discharge:   Respiratory Treatments: budesonide-formoterol (SYMBICORT) 80-4.5 MCG/ACT inhaler 2 puff    Dose 2 puff : Inhalation : 3 TIMES DAILY PRN : shortness of breath   Oxygen Therapy:  is on oxygen at 1-2 L/min per nasal cannula.   Ventilator:    - No ventilator support    Rehab Therapies: Physical Therapy and Occupational Therapy  Weight Bearing Status/Restrictions: No weight bearing restrictions  Other Medical Equipment (for information only, NOT a DME order):  wheelchair, bath bench, and hospital bed  Other Treatments: NA    Patient's personal belongings (please select all that are sent with patient):  Glasses, personal clothing    RN SIGNATURE:  Electronically signed by Oliverio Segura RN on 5/10/22 at 12:43 PM EDT    CASE MANAGEMENT/SOCIAL WORK SECTION    Inpatient Status Date: ***    Readmission Risk Assessment Score:  Readmission Risk              Risk of Unplanned Readmission:  24           Discharging to Facility/ Agency   Name: [facility] Skilled  Address:  [address] [place] [address]         Phone: [phone]       Fax: [phone]        Phone:  Fax:    Dialysis Facility (if applicable)   Name:      / signature: Electronically signed by Hannah Black RN on 5/10/22 at 10:47 AM EDT    PHYSICIAN SECTION    Prognosis: Fair    Condition at Discharge: Stable    Rehab Potential (if transferring to Rehab): Fair    Recommended Labs or Other Treatments After Discharge:      Physician Certification: I certify the above information and transfer of Jamie Amaya  is necessary for the continuing treatment of the diagnosis listed and that she requires [facility] for greater 30 days.      Update Admission H&P: No change in H&P    PHYSICIAN SIGNATURE:  Electronically signed by Joyce Steen MD on 5/10/22 at 11:39 AM EDT

## 2022-05-10 NOTE — CARE COORDINATION
Transitional planning:  Nurse rounds: Midline placement per Aouad. Needs stretcher for transport. O2 2L nc. Return to [facility]. Bed on hold. [address] [place] [address]. Notified of order per Aouad and no PICC team on today. Ludmila Gonzalez voiced understanding and will review chart for order. Discussed with ROBERT CASTILLO, unit RN, who called IR and clarified what type of order they need. [address] [facility], medical necessity and transportation form to [facility]) life flight network. On will call. [address] in admissions at [facility] and can accept anytime. No HENS required. Transportation packet include H & P, PT/OT notes and transportation forms. Included MAR report. [address] [facility] [address] Marizol Lara team and notified of above plan. Need ZARA/AVS completed. 1140 Dr. Dilshad Garcia here. Pt is in IR. [address], spoke with Alejandrina Mustafa, can transport at 2 pm today. ROBERT CASTILLO, unit RN notified of transport time and will completed [address] needs that faxed to [phone]. Nurse report number is [phone].     [phone] Called pt's son, Danuta Lea, notified of transport time and place. He states he wants to make sure all the meds are the same, especially her psych medications that she was taking at the nursing home, because it happens everytime she is in the hospital and if not the same,  she doesn't do well. Notified ROBERT CASTILLO, unit RN, who will contact Dr. Dilshad Garcia. [address] [facility] completed ZARA/AVS to [facility] at [phone]. Gave unit RN transportation packet and nurse report number.

## 2022-05-10 NOTE — PROGRESS NOTES
Report called to Anna Marie Dumas RN at [facility]. All questions answered. Call back number provided.

## 2022-05-10 NOTE — BRIEF OP NOTE
Brief Postoperative Note    Laron Wolfe  YOB: 1945  [id number]    Pre-operative Diagnosis: UTI    Post-operative Diagnosis: Same    Procedure: Midline Placement    Anesthesia: Local      Surgeons/Assistants: Kyree Victor MD    Estimated Blood Loss: Minimal    Complications: none    Specimens: were not obtained    Procedure: Midline Placement. 4.5Fr x 15 cm 1 lumen Power Midlineplaced in right upper extremity. Dressings applied, Midlinesecured to skin. May use Midline.       Electronically signed by CHICO Garcia on 5/10/2022 at 11:54 AM

## 2022-05-11 NOTE — PROGRESS NOTES
[address]  CDU / OBSERVATION ENCOUNTER  ATTENDING NOTE       I performed a history and physical examination of the patient and discussed management with the resident or midlevel provider. I reviewed the resident or midlevel provider's note and agree with the documented findings and plan of care. Any areas of disagreement are noted on the chart. I was personally present for the key portions of any procedures. I have documented in the chart those procedures where I was not present during the key portions. I have reviewed the nurses notes. I agree with the chief complaint, past medical history, past surgical history, allergies, medications, social and family history as documented unless otherwise noted below. The Family history, social history, and ROS are effectively unchanged since admission unless noted elsewhere in the chart. Longer-term outpatient antibiotics have been arranged. Patient for secure IV access and discharge. Patient comfortable with plan. ZARA completed. Transportation arranged.   Patient to supervised setting    Joyce Steen MD  Attending Emergency  Physician

## 2022-05-27 DIAGNOSIS — D50.0 ANEMIA, BLOOD LOSS: ICD-10-CM

## 2022-05-27 NOTE — PROGRESS NOTES
GI CLINIC FOLLOW UP    INTERVAL HISTORY:   No referring provider defined for this encounter. Chief Complaint   Patient presents with    Follow Up After Procedure     Patient is here to f/u on anemia and colonoscopy. HISTORY OF PRESENT ILLNESS: José Paez is a 68 y.o. female , referred for evaluation of*elevated liver enzymes, large hiatal hernia status post robotic repair with fundoplication, PEG and trach in the past , GERD, gastric outlet obstruction, dysphagia/E dil , and now colon polyps and hemorrhoid       Patient is here for follow-up  Was seen last visit with the above multiple issues  Which started her with anemia work-up with the Y03 and folic acid check  And schedule her for colonoscopy  Labs b12/folate not done    HGB 5/6/22 normal 14.2   Her labs also showed elevated alkaline phosphatase  LFTS 5/6/22: al normal but ALP og 118 ( bettr than before ) ct scan 5/6/22: no mass , negative   Asymptomatic   Today No N/v   no abd pain   no melena/hematemsis/hematochezia  No dysphagia/odynophagia   no wt loss   no diarrhea /contipation              The prep was poor.       Findings:  Terminal ileum: normal     Cecum/Ascending colon: abnormal: Cecal polyp 9 mm removed with a snare     Transverse colon: normal     Descending/Sigmoid colon: abnormal: Sigmoid polyp 9 mm removed with the snare     Rectum/Anus: examined in normal and retroflexed positions and was abnormal: Suboptimal prep     Hemorrhoids      Withdrawal Time was (minutes): 10     The colon was decompressed and the scope was removed. The patient tolerated the procedure well.      Recommendations/Plan:   1. Lifestyle and dietary modifications as discussed  2. F/U Biopsies  3. F/U In OfficeYes  4. Discussed with the family  5. Repeat colonoscopy [id number]     Electronically signed by Sis Browne MD  on 1/28/2022 at 11:45 AM   -- Diagnosis --   1.  CECUM, BIOPSY:   - ADENOMATOUS POLYP (TUBULAR ADENOMA).      2.  SIGMOID COLON, BIOPSY:   - ADENOMATOUS POLYP (TUBULAR ADENOMA).        Luther Blanca M.D.   **Electronically Signed Out**         sls/1/31/2022     Past Medical,Family, and Social History reviewed and does contribute to the patient presentingcondition. Patient's PMH/PSH,SH,PSYCH Hx, MEDs, ALLERGIES, and ROS were all reviewed and updated in the appropriate sections.     PAST MEDICAL HISTORY:  Past Medical History:   Diagnosis Date    Anxiety     Arthritis     Back pain     Bronchitis     Caffeine use     8 coffee / day    Carpal tunnel syndrome     Cataracts, bilateral     Constipation     COPD (chronic obstructive pulmonary disease) (HCC)     Emphysema    Depression     Diarrhea     GERD (gastroesophageal reflux disease)     H/O gastric ulcer     Hyperlipidemia     Hypertension     Mumps     MVP (mitral valve prolapse)     Dr. Reina Mcconnell in May 2019    Recurrent UTI     Dr. Rona Boateng    Sinusitis     Tracheostomy in place [facility])     Ulcerative esophagitis     Wellness examination     Dr. Ramses Bell seen in Jan 2020       Past Surgical History:   Procedure Laterality Date    APPENDECTOMY      CARPAL TUNNEL RELEASE      CHOLECYSTECTOMY, LAPAROSCOPIC N/A 05/22/2020    XI LAPAROSCOPIC ROBOTIC CHOLECYSTECTOMY, PYLOROPLASTY performed by Kellie Polo MD at [facility] 11 COLONOSCOPY N/A 1/28/2022    COLONOSCOPY POLYPECTOMY HOT performed by Vinay Madison MD at [address] ERCP  05/21/2020    with stent insertion, balloon dilation, sphinctereotomy    ERCP  05/21/2020    ** CASE IN OR WITY GI STAFF** ERCP STENT INSERTION performed by Vinay Madison MD at [facility]    ERCP  05/21/2020    ** CASE IN OR WITH GI STAFF**ERCP SPHINCTER/PAPILLOTOMY performed by Vinay Madison MD at [facility]    ERCP  05/21/2020    ** CASE IN OR WITH GI STAFF**ERCP DILATION BALLOON performed by Vinay Madison MD at [facility]    ERCP N/A 2/8/2021    ERCP STENT REMOVAL performed by Giovanny Blake Angela Knott MD at [facility]    ERCP  2/8/2021    ERCP STONE REMOVAL performed by Gustavo Serra MD at [address]      patricia IOL    GASTRIC FUNDOPLICATION N/A 70/36/9479    XI LAPAROSCOPIC ROBOTIC HIATAL HERNIA REPAIR, CHERYL FUNDOPLICATION performed by Guerita Rider MD at [address] 03/27/2020    EGD PEG TUBE PLACEMENT performed by Guerita Rider MD at [address] N/A 2/8/2021    **CASE IN O.R. W/ GI STAFF - EGD WITH REMOVAL PEG TUBE performed by Gustavo Serra MD at [facility]    HAND SURGERY      [facility]. CATH POWER PICC TRIPLE  03/04/2020         HERNIA REPAIR      Hiatal hernia    HYSTERECTOMY      Abdominal    IR NONTUNNELED VASCULAR CATHETER  5/10/2022    IR NONTUNNELED VASCULAR CATHETER 5/10/2022 Lilli Gottron, MD [facility]    JOINT REPLACEMENT Right     right hip    OVARY REMOVAL      RHINOPLASTY      TONSILLECTOMY      TOTAL HIP ARTHROPLASTY      TOTAL NEPHRECTOMY      atrophic from infections, age 13   [facility] N/A 03/27/2020    **ADD ON **WANTS 10:00AM **TRACHEOTOMY performed by Guerita Rider MD at [address] [facility] 04/02/2020    TRACHEOTOMY EXCHANGE performed by Guerita Rider MD at [address] [place] [address] N/A 03/17/2020    BEDSIDE EGD ESOPHAGOGASTRODUODENOSCOPY (ICU) performed by Guerita Rider MD at [address] [place] [address] N/A 05/18/2020    EGD BIOPSY performed by Slade Walton MD at [address] [place] [address]  05/18/2020    EGD DILATION BALLOON performed by Slade Walton MD at [address] [place] [address] N/A 07/06/2020    ** CASE IN O.R. WITH GI STAFF** EGD ESOPHAGOGASTRODUODENOSCOPY performed by Clem May MD at [address] [place] [address] [facility] 11/09/2020    EGD DIAGNOSTIC ONLY performed by Ant Jon MD at [facility]    UPPER GASTROINTESTINAL ENDOSCOPY  02/08/2021    - EGD WITH REMOVAL PEG TUBE,ERCP STENT REMOVAL,ERCP STONE REMOVAL    UPPER GASTROINTESTINAL ENDOSCOPY N/A 11/24/2021    EGD BIOPSY performed by Caroline Linder MD at [address] [place] [address] St:    Current Outpatient Medications:     clotrimazole-betamethasone (LOTRISONE) 1-0.05 % cream, Apply topically 2 times daily Apply topically 2 times daily. , Disp: 15 g, Rfl: 0    Cholecalciferol (VITAMIN D) 50 MCG (2000 UT) CAPS capsule, Take 1 capsule by mouth daily, Disp: 30 capsule, Rfl: 0    guaiFENesin (MUCINEX) 600 MG extended release tablet, Take 800 mg by mouth 2 times daily Indications: Cough Take two tablets by mouth two times per day, Disp: , Rfl:     Lactobacillus TABS, Take 1 tablet by mouth 2 times daily Take One tablet by mouth two times daily, Disp: , Rfl:     fluticasone (FLONASE) 50 MCG/ACT nasal spray, 1 spray by Each Nostril route daily, Disp: , Rfl:     loratadine (CLARITIN) 10 MG capsule, Take 10 mg by mouth daily, Disp: , Rfl:     clonazePAM (KLONOPIN) 0.5 MG tablet, Take 0.5 mg by mouth 2 times daily as needed. , Disp: , Rfl:     bisacodyl (DULCOLAX) 5 MG EC tablet, TAKE 2 TABS AS INSTRUCTED BY THE OFFICE THE DAY PRIOR TO COLONOSCOPY, Disp: 2 tablet, Rfl: 0    metoclopramide (REGLAN) 5 MG tablet, Take 1 tablet by mouth 4 times daily, Disp: 120 tablet, Rfl: 3    polyethylene glycol (GLYCOLAX) 17 g packet, Take 17 g by mouth daily as needed for Constipation, Disp: 527 g, Rfl: 1    QUEtiapine (SEROQUEL) 100 MG tablet, Take 1 tablet by mouth nightly for 3 days, Disp: 3 tablet, Rfl: 0    QUEtiapine (SEROQUEL) 25 MG tablet, Take 1 tablet by mouth every morning (before breakfast) for 3 days, Disp: 3 tablet, Rfl: 0    gabapentin (NEURONTIN) 300 MG capsule, Take 1 capsule by mouth 3 times daily for 3 days. , Disp: 9 capsule, Rfl: 0    escitalopram (LEXAPRO) 10 MG tablet, Take 10 mg by mouth daily, Disp: , Rfl:    Folic Acid-Cholecalciferol 1-2000 MG-UNIT TABS, Take by mouth, Disp: , Rfl:     busPIRone (BUSPAR) 10 MG tablet, Take 1 tablet by mouth 2 times daily, Disp: , Rfl: 0    pantoprazole (PROTONIX) 40 MG tablet, Take 1 tablet by mouth 2 times daily, Disp: 60 tablet, Rfl: 0    traZODone (DESYREL) 100 MG tablet, Take 1 tablet by mouth nightly for 7 days, Disp: 7 tablet, Rfl: 0    bisacodyl (DULCOLAX) 10 MG suppository, Place 10 mg rectally daily as needed for Constipation, Disp: , Rfl:     Sodium Phosphates (FLEET) 7-19 GM/118ML, Place 1 enema rectally As needed after no BM for 5 days, Disp: , Rfl:     budesonide-formoterol (SYMBICORT) 160-4.5 MCG/ACT AERO, Inhale 2 puffs into the lungs 2 times daily, Disp: , Rfl:     ferrous sulfate (FE TABS 325) 325 (65 Fe) MG EC tablet, Take 1 tablet by mouth daily (with breakfast), Disp: 90 tablet, Rfl: 3    amitriptyline (ELAVIL) 25 MG tablet, Take 1 tablet by mouth nightly, Disp: , Rfl:     metoprolol succinate (TOPROL XL) 25 MG extended release tablet, Take 1 tablet by mouth daily, Disp: 30 tablet, Rfl: 3    sucralfate (CARAFATE) 1 GM tablet, Take 1 tablet by mouth 4 times daily, Disp: 120 tablet, Rfl: 3    Oyster Shell 500 MG TABS, Take 500 mg by mouth 2 times daily , Disp: , Rfl:     calcium carbonate (TUMS) 500 MG chewable tablet, Take 1 tablet by mouth 3 times daily as needed for Heartburn, Disp: , Rfl:     simvastatin (ZOCOR) 40 MG tablet, Take 40 mg by mouth nightly., Disp: , Rfl:     ALLERGIES:   Allergies   Allergen Reactions    Prednisone Anxiety    Compazine [Prochlorperazine Maleate]     Penicillin V Potassium     Penicillins Other (See Comments)     Shock- received meropenem and ceftriaxone wo issues 2020       FAMILY HISTORY:       Problem Relation Age of Onset    Cancer Mother         uterine    Heart Attack Mother     Heart Attack Father     Other Maternal Grandfather         foot gangrene    Other Paternal Grandmother         bleeding ulcers    Other Paternal Grandfather         lung cancer         SOCIAL HISTORY:   Social History     Socioeconomic History    Marital status:      Spouse name: Not on file    Number of children: 2    Years of education: Not on file    Highest education level: Not on file   Occupational History    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   Tobacco Use    Smoking status: Former Smoker     Packs/day: 1.00     Years: 50.00     Pack years: 50.00     Types: Cigarettes    Smokeless tobacco: Never Used    Tobacco comment: quit 1 year ago    Vaping Use    Vaping Use: Former    Substances: Always   Substance and Sexual Activity    Alcohol use: No    Drug use: No    Sexual activity: Never   Other Topics Concern    Not on file   Social History Narrative    Not on file     Social Determinants of Health     Financial Resource Strain:     Difficulty of Paying Living Expenses: Not on file   Food Insecurity:     Worried About [address] in the Last Year: Not on file    [address] [employer] in the Last Year: Not on file   Transportation Needs:     Lack of Transportation (Medical): Not on file    Lack of Transportation (Non-Medical):  Not on file   Physical Activity:     Days of Exercise per Week: Not on file    Minutes of Exercise per Session: Not on file   Stress:     Feeling of Stress : Not on file   Social Connections:     Frequency of Communication with Friends and Family: Not on file    Frequency of Social Gatherings with Friends and Family: Not on file    Attends [employer] Services: Not on file    Active Member of Clubs or Organizations: Not on file    Attends Club or Organization Meetings: Not on file    Marital Status: Not on file   Intimate Partner Violence:     Fear of Current or Ex-Partner: Not on file    Emotionally Abused: Not on file    Physically Abused: Not on file    Sexually Abused: Not on file   Housing Stability:     Unable to Pay for Housing in the Last Year: Not on file    Number of Places Lived in the Last Year: Not on file    Unstable Housing in the Last Year: Not on file       REVIEW OF SYSTEMS: A 12-point review of systemswas obtained and pertinent positives and negatives were enumerated above in the history of present illness. All other reviewed systems / symptoms were negative. Review of Systems   Constitutional: Negative for appetite change, fatigue and unexpected weight change. HENT: Negative for trouble swallowing. Respiratory: Negative for cough, choking, shortness of breath and wheezing. Cardiovascular: Negative for chest pain, palpitations and leg swelling. Gastrointestinal: Positive for abdominal pain, constipation and diarrhea. Negative for abdominal distention, anal bleeding, blood in stool, nausea, rectal pain and vomiting. Genitourinary: Negative for difficulty urinating. Allergic/Immunologic: Negative for environmental allergies and food allergies. Neurological: Negative for dizziness, weakness, light-headedness, numbness and headaches. Hematological: Bruises/bleeds easily. Psychiatric/Behavioral: Negative for sleep disturbance. The patient is not nervous/anxious.             LABORATORY DATA: Reviewed  Lab Results   Component Value Date    WBC 8.7 05/06/2022    HGB 14.2 05/06/2022    HCT 45.0 05/06/2022    MCV 89.5 05/06/2022     05/06/2022     05/06/2022    K 4.0 05/06/2022     05/06/2022    CO2 23 05/06/2022    BUN 16 05/06/2022    CREATININE 1.13 (H) 05/06/2022    LABALBU 3.9 05/06/2022    BILITOT 0.30 05/06/2022    ALKPHOS 118 (H) 05/06/2022    AST 24 05/06/2022    ALT 21 05/06/2022    INR 1.0 05/06/2022         Lab Results   Component Value Date    RBC 5.03 05/06/2022    HGB 14.2 05/06/2022    MCV 89.5 05/06/2022    MCH 28.2 05/06/2022    MCHC 31.6 05/06/2022    RDW 15.5 (H) 05/06/2022    MPV 11.1 05/06/2022    BASOPCT 0 05/06/2022    LYMPHSABS 0.61 (L) 05/06/2022    MONOSABS 0.61 05/06/2022    NEUTROABS 7.13 05/06/2022    EOSABS 0.26 05/06/2022    BASOSABS 0.00 05/06/2022         DIAGNOSTIC TESTING:     XR ACUTE ABD SERIES CHEST 1 VW    Result Date: 5/8/2022  EXAMINATION: TWO XRAY VIEWS OF THE ABDOMEN AND SINGLE  XRAY VIEW OF THE CHEST 5/8/2022 11:31 am COMPARISON: Chest radiograph 05/06/2022. CT abdomen and pelvis 05/06/2022. HISTORY: ORDERING SYSTEM PROVIDED HISTORY: abdominal pain TECHNOLOGIST PROVIDED HISTORY: abdominal pain FINDINGS: The cardiomediastinal silhouette is unchanged. No pneumothorax, vascular congestion, consolidation, or pleural effusion is identified. Nonobstructive bowel gas pattern. Mildly increased stool in the colon. No evidence of pneumoperitoneum. No acute osseous abnormality. 1. No acute process in the chest. 2. Nonobstructive bowel gas pattern. Increased stool in the colon compatible with constipation. CT ABDOMEN PELVIS W IV CONTRAST Additional Contrast? None    Result Date: 5/6/2022  EXAMINATION: CT OF THE ABDOMEN AND PELVIS WITH CONTRAST 5/6/2022 11:31 am TECHNIQUE: CT of the abdomen and pelvis was performed with the administration of intravenous contrast. Multiplanar reformatted images are provided for review. Dose modulation, iterative reconstruction, and/or weight based adjustment of the mA/kV was utilized to reduce the radiation dose to as low as reasonably achievable. COMPARISON: December 18, 2021 HISTORY: ORDERING SYSTEM PROVIDED HISTORY:  Eval for diverticulitis TECHNOLOGIST PROVIDED HISTORY: Eval for diverticulitis Decision Support Exception - unselect if not a suspected or confirmed emergency medical condition->Emergency Medical Condition (MA) Reason for Exam:  Eval for diverticulitis FINDINGS: Lower Chest: Again demonstrated are the ground-glass opacities in the lingula and right middle lobe probably sequelae of prior infection/inflammatory process. Dilated fluid-filled distal esophagus extending to a small hiatal hernia also redemonstrated. Organs: Hepatic steatosis is present.   No focal liver lesion. The gallbladder is surgically absent. No significant biliary ductal dilatation. The spleen is normal in size without focal lesion. The pancreas and the adrenal glands are unremarkable. 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 No left hydronephrosis. 11 mm left lower pole renal cyst redemonstrated. No concerning renal lesion. GI/Bowel: The patient has had prior appendectomy. No bowel obstruction. Pelvis: Remote hysterectomy. The urinary bladder is unremarkable. Peritoneum/Retroperitoneum: No bulky lymphadenopathy. No ascites. Bones/Soft Tissues: Partially visualized right total hip arthroplasty appears intact. No acute osseous abnormality. No acute process in the abdomen or pelvis. XR CHEST PORTABLE    Result Date: 5/6/2022  EXAMINATION: ONE XRAY VIEW OF THE CHEST 5/6/2022 11:15 am COMPARISON: None. HISTORY: ORDERING SYSTEM PROVIDED HISTORY: eval for pmn TECHNOLOGIST PROVIDED HISTORY: eval for pmn FINDINGS: No acute airspace infiltrates. No pneumothorax or pleural effusion. Normal cardiomediastinal silhouette     No acute cardiopulmonary disease     IR NONTUNNELED VASCULAR CATHETER > 5 YEARS    Result Date: 5/10/2022  PROCEDURE: ULTRASOUND GUIDED VASCULAR ACCESS. PLACEMENT OF A NON-TUNNELED CATHETER. MODERATE CONSCIOUS SEDATION 5/10/2022. HISTORY: ORDERING SYSTEM PROVIDED HISTORY: midline insertion TECHNOLOGIST PROVIDED HISTORY: midline insertion SEDATION: None Fluoroscopy time 0.2 minutes, DAP 21 CGy cm2 TECHNIQUE: Informed consent was obtained after a detailed discussion about the procedure including the risk, benefits, and alternatives. Universal protocol was followed. Sterile gowns, masks, hats and gloves utilized for maximal sterile barrier. Patient is placed supine on table. Right arm was prepped and draped sterile fashion. Ultrasound used to identify the basilic vein. 1% lidocaine was infiltrated for local anesthesia.   Under ultrasound guidance micropuncture access of the vein was obtained. A peel-away sheath was inserted. Through the peel-away sheath a single-lumen midline PICC line was advanced tip placed in the axillary vein. Peel-away sheath was removed. PICC line was secured the skin. It was flushed and aspirated. Dressing was applied. Spot image was obtained. FINDINGS: PICC line in good position in the right arm. Successful ultrasound guided non-tunneled single lumen midline PICC catheter placement. PHYSICAL EXAMINATION: Vital signs reviewed per the nursing documentation. BP (!) 100/55   Pulse 70   Temp 97 °F (36.1 °C)   There is no height or weight on file to calculate BMI. Physical Exam  Vitals and nursing note reviewed. Constitutional:       General: She is not in acute distress. Appearance: She is well-developed. She is not diaphoretic. HENT:      Head: Normocephalic. Mouth/Throat:      Pharynx: No oropharyngeal exudate. Eyes:      General: No scleral icterus. Pupils: Pupils are equal, round, and reactive to light. Neck:      Thyroid: No thyromegaly. Vascular: No JVD. Trachea: No tracheal deviation. Cardiovascular:      Rate and Rhythm: Normal rate and regular rhythm. Heart sounds: Normal heart sounds. No murmur heard. Pulmonary:      Effort: Pulmonary effort is normal. No respiratory distress. Breath sounds: Normal breath sounds. No wheezing. Abdominal:      General: Bowel sounds are normal. There is no distension. Palpations: Abdomen is soft. Tenderness: There is no abdominal tenderness. There is no guarding or rebound. Comments: No ascites   Musculoskeletal:         General: Normal range of motion. Cervical back: Normal range of motion and neck supple. Skin:     General: Skin is warm. Coloration: Skin is not pale. Findings: No erythema or rash.       Comments: She is not diaphoretic   Neurological:      Mental Status: She is alert and oriented to person, place, and time. Deep Tendon Reflexes: Reflexes are normal and symmetric. Psychiatric:         Behavior: Behavior normal.         Thought Content: Thought content normal.         Judgment: Judgment normal.           IMPRESSION: Ms. Kaya Villegas is a 68 y.o. female with      Diagnosis Orders   1. Anemia, blood loss     2. S/P Nissen fundoplication (without gastrostomy tube) procedure     3. Elevated liver enzymes     4. Biliary stent obstruction, subsequent encounter       As long the hemoglobin right now is normal and the patient is asymptomatic  And the alkaline phosphatase elevation is explained with the hip issue  We will watch at this time  Patient was told to let me know if there is any new issue  Or if there is any exacerbation of her symptoms  Diet/life style/natural hx /complication of the dx were all explained in details   Past medical, past surgical, social history, psychiatric history, medications or allergies, all reviewed and  updated    Thank you for allowing me to participate in the care of Ms. Kaya Villegas. For any further questions please do not hesitate to contact me. I have reviewed and agree with the ROS entered by the MA/RN. Note is dictated utilizing voice recognition software. Unfortunately this leads to occasional typographical errors. Please contact our office if you have any questions.       Sylvia Lion MD  [facility]  O: #[phone]

## 2022-06-01 ENCOUNTER — OFFICE VISIT (OUTPATIENT)
Dept: GASTROENTEROLOGY | Age: 77
End: 2022-06-01
Payer: MEDICARE

## 2022-06-01 VITALS — SYSTOLIC BLOOD PRESSURE: 100 MMHG | TEMPERATURE: 97 F | DIASTOLIC BLOOD PRESSURE: 55 MMHG | HEART RATE: 70 BPM

## 2022-06-01 DIAGNOSIS — Z98.890 S/P NISSEN FUNDOPLICATION (WITHOUT GASTROSTOMY TUBE) PROCEDURE: ICD-10-CM

## 2022-06-01 DIAGNOSIS — R74.8 ELEVATED LIVER ENZYMES: ICD-10-CM

## 2022-06-01 DIAGNOSIS — T85.590D: ICD-10-CM

## 2022-06-01 DIAGNOSIS — D50.0 ANEMIA, BLOOD LOSS: Primary | ICD-10-CM

## 2022-06-01 PROCEDURE — G8400 PT W/DXA NO RESULTS DOC: HCPCS | Performed by: INTERNAL MEDICINE

## 2022-06-01 PROCEDURE — 1111F DSCHRG MED/CURRENT MED MERGE: CPT | Performed by: INTERNAL MEDICINE

## 2022-06-01 PROCEDURE — 99213 OFFICE O/P EST LOW 20 MIN: CPT | Performed by: INTERNAL MEDICINE

## 2022-06-01 PROCEDURE — G8427 DOCREV CUR MEDS BY ELIG CLIN: HCPCS | Performed by: INTERNAL MEDICINE

## 2022-06-01 PROCEDURE — G8417 CALC BMI ABV UP PARAM F/U: HCPCS | Performed by: INTERNAL MEDICINE

## 2022-06-01 PROCEDURE — 1036F TOBACCO NON-USER: CPT | Performed by: INTERNAL MEDICINE

## 2022-06-01 PROCEDURE — 1123F ACP DISCUSS/DSCN MKR DOCD: CPT | Performed by: INTERNAL MEDICINE

## 2022-06-01 PROCEDURE — 1090F PRES/ABSN URINE INCON ASSESS: CPT | Performed by: INTERNAL MEDICINE

## 2022-06-01 ASSESSMENT — ENCOUNTER SYMPTOMS
BLOOD IN STOOL: 0
ABDOMINAL DISTENTION: 0
CHOKING: 0
CONSTIPATION: 1
ANAL BLEEDING: 0
DIARRHEA: 1
ABDOMINAL PAIN: 1
WHEEZING: 0
COUGH: 0
NAUSEA: 0
VOMITING: 0
TROUBLE SWALLOWING: 0
RECTAL PAIN: 0
SHORTNESS OF BREATH: 0

## 2022-06-05 PROBLEM — N39.0 UTI (URINARY TRACT INFECTION): Status: RESOLVED | Noted: 2022-05-06 | Resolved: 2022-06-05

## 2022-07-11 NOTE — PLAN OF CARE
Problem: OXYGENATION/RESPIRATORY FUNCTION  Goal: Patient will maintain patent airway  Outcome: Ongoing  Goal: Patient will achieve/maintain normal respiratory rate/effort  Respiratory rate and effort will be within normal limits for the patient  Outcome: Ongoing    Problem: MECHANICAL VENTILATION  Goal: Patient will maintain patent airway  Outcome: Ongoing  Goal: Oral health is maintained or improved  Outcome: Ongoing  Goal: ET tube will be managed safely  Outcome: Ongoing  Goal: Ability to express needs and understand communication  Outcome: Ongoing  Goal: Mobility/activity is maintained at optimum level for patient  Outcome: Ongoing    Problem: ASPIRATION PRECAUTIONS  Goal: Patients risk of aspiration is minimized  Outcome: Ongoing    Problem: SKIN INTEGRITY  Goal: Skin integrity is maintained or improved  Outcome: Ongoing No abnormalities No abnormalities No abnormalities No abnormalities No abnormalities No abnormalities No abnormalities No abnormalities No abnormalities

## 2022-07-31 ENCOUNTER — APPOINTMENT (OUTPATIENT)
Dept: GENERAL RADIOLOGY | Age: 77
DRG: 690 | End: 2022-07-31
Payer: MEDICARE

## 2022-07-31 ENCOUNTER — HOSPITAL ENCOUNTER (INPATIENT)
Age: 77
LOS: 5 days | Discharge: OTHER INSTITUTION | DRG: 690 | End: 2022-08-05
Attending: EMERGENCY MEDICINE | Admitting: INTERNAL MEDICINE
Payer: MEDICARE

## 2022-07-31 ENCOUNTER — APPOINTMENT (OUTPATIENT)
Dept: CT IMAGING | Age: 77
DRG: 690 | End: 2022-07-31
Payer: MEDICARE

## 2022-07-31 DIAGNOSIS — A41.9 SEPTICEMIA (HCC): ICD-10-CM

## 2022-07-31 DIAGNOSIS — N30.00 ACUTE CYSTITIS WITHOUT HEMATURIA: Primary | ICD-10-CM

## 2022-07-31 PROBLEM — N39.0 UTI (URINARY TRACT INFECTION): Status: ACTIVE | Noted: 2022-07-31

## 2022-07-31 LAB
-: ABNORMAL
ABSOLUTE EOS #: 0.06 K/UL (ref 0–0.4)
ABSOLUTE IMMATURE GRANULOCYTE: 0.06 K/UL (ref 0–0.3)
ABSOLUTE LYMPH #: 0.66 K/UL (ref 1–4.8)
ABSOLUTE MONO #: 0.48 K/UL (ref 0.1–0.8)
ALBUMIN SERPL-MCNC: 4.3 G/DL (ref 3.5–5.2)
ALBUMIN/GLOBULIN RATIO: 1.4 (ref 1–2.5)
ALP BLD-CCNC: 131 U/L (ref 35–104)
ALT SERPL-CCNC: 37 U/L (ref 5–33)
ANION GAP SERPL CALCULATED.3IONS-SCNC: 14 MMOL/L (ref 9–17)
AST SERPL-CCNC: 50 U/L
BACTERIA: ABNORMAL
BASOPHILS # BLD: 1 % (ref 0–2)
BASOPHILS ABSOLUTE: 0.06 K/UL (ref 0–0.2)
BILIRUB SERPL-MCNC: 0.27 MG/DL (ref 0.3–1.2)
BILIRUBIN URINE: NEGATIVE
BUN BLDV-MCNC: 17 MG/DL (ref 8–23)
CALCIUM SERPL-MCNC: 9.6 MG/DL (ref 8.6–10.4)
CHLORIDE BLD-SCNC: 95 MMOL/L (ref 98–107)
CO2: 23 MMOL/L (ref 20–31)
COLOR: YELLOW
CREAT SERPL-MCNC: 1.03 MG/DL (ref 0.5–0.9)
EOSINOPHILS RELATIVE PERCENT: 1 % (ref 1–4)
EPITHELIAL CELLS UA: ABNORMAL /HPF (ref 0–5)
GFR AFRICAN AMERICAN: >60 ML/MIN
GFR NON-AFRICAN AMERICAN: 52 ML/MIN
GFR SERPL CREATININE-BSD FRML MDRD: ABNORMAL ML/MIN/{1.73_M2}
GLUCOSE BLD-MCNC: 112 MG/DL (ref 70–99)
GLUCOSE URINE: NEGATIVE
HCT VFR BLD CALC: 44.3 % (ref 36.3–47.1)
HEMOGLOBIN: 15.1 G/DL (ref 11.9–15.1)
IMMATURE GRANULOCYTES: 1 %
KETONES, URINE: NEGATIVE
LACTIC ACID, SEPSIS WHOLE BLOOD: 1.2 MMOL/L (ref 0.5–1.9)
LACTIC ACID, SEPSIS WHOLE BLOOD: 2.1 MMOL/L (ref 0.5–1.9)
LEUKOCYTE ESTERASE, URINE: ABNORMAL
LYMPHOCYTES # BLD: 11 % (ref 24–44)
MAGNESIUM: 1.7 MG/DL (ref 1.6–2.6)
MCH RBC QN AUTO: 29.8 PG (ref 25.2–33.5)
MCHC RBC AUTO-ENTMCNC: 34.1 G/DL (ref 28.4–34.8)
MCV RBC AUTO: 87.5 FL (ref 82.6–102.9)
MONOCYTES # BLD: 8 % (ref 1–7)
MORPHOLOGY: ABNORMAL
MORPHOLOGY: ABNORMAL
NITRITE, URINE: NEGATIVE
NRBC AUTOMATED: 0 PER 100 WBC
PDW BLD-RTO: 15.3 % (ref 11.8–14.4)
PH UA: 8.5 (ref 5–8)
PLATELET # BLD: 142 K/UL (ref 138–453)
PMV BLD AUTO: 12 FL (ref 8.1–13.5)
POTASSIUM SERPL-SCNC: 4.8 MMOL/L (ref 3.7–5.3)
PROCALCITONIN: 0.3 NG/ML
PROTEIN UA: ABNORMAL
RBC # BLD: 5.06 M/UL (ref 3.95–5.11)
RBC UA: ABNORMAL /HPF (ref 0–4)
SEG NEUTROPHILS: 78 % (ref 36–66)
SEGMENTED NEUTROPHILS ABSOLUTE COUNT: 4.68 K/UL (ref 1.8–7.7)
SODIUM BLD-SCNC: 132 MMOL/L (ref 135–144)
SPECIFIC GRAVITY UA: 1.01 (ref 1–1.03)
TOTAL PROTEIN: 7.3 G/DL (ref 6.4–8.3)
TROPONIN, HIGH SENSITIVITY: 16 NG/L (ref 0–14)
TURBIDITY: CLEAR
URINE HGB: ABNORMAL
UROBILINOGEN, URINE: NORMAL
WBC # BLD: 6 K/UL (ref 3.5–11.3)
WBC UA: ABNORMAL /HPF (ref 0–5)

## 2022-07-31 PROCEDURE — 74176 CT ABD & PELVIS W/O CONTRAST: CPT

## 2022-07-31 PROCEDURE — 84484 ASSAY OF TROPONIN QUANT: CPT

## 2022-07-31 PROCEDURE — 6360000002 HC RX W HCPCS

## 2022-07-31 PROCEDURE — 87186 SC STD MICRODIL/AGAR DIL: CPT

## 2022-07-31 PROCEDURE — 6370000000 HC RX 637 (ALT 250 FOR IP)

## 2022-07-31 PROCEDURE — 70450 CT HEAD/BRAIN W/O DYE: CPT

## 2022-07-31 PROCEDURE — 87077 CULTURE AEROBIC IDENTIFY: CPT

## 2022-07-31 PROCEDURE — 85025 COMPLETE CBC W/AUTO DIFF WBC: CPT

## 2022-07-31 PROCEDURE — 51798 US URINE CAPACITY MEASURE: CPT

## 2022-07-31 PROCEDURE — 71045 X-RAY EXAM CHEST 1 VIEW: CPT

## 2022-07-31 PROCEDURE — 87040 BLOOD CULTURE FOR BACTERIA: CPT

## 2022-07-31 PROCEDURE — 6370000000 HC RX 637 (ALT 250 FOR IP): Performed by: STUDENT IN AN ORGANIZED HEALTH CARE EDUCATION/TRAINING PROGRAM

## 2022-07-31 PROCEDURE — 83605 ASSAY OF LACTIC ACID: CPT

## 2022-07-31 PROCEDURE — 81001 URINALYSIS AUTO W/SCOPE: CPT

## 2022-07-31 PROCEDURE — 96374 THER/PROPH/DIAG INJ IV PUSH: CPT

## 2022-07-31 PROCEDURE — 84145 PROCALCITONIN (PCT): CPT

## 2022-07-31 PROCEDURE — 6370000000 HC RX 637 (ALT 250 FOR IP): Performed by: NURSE PRACTITIONER

## 2022-07-31 PROCEDURE — 6360000002 HC RX W HCPCS: Performed by: STUDENT IN AN ORGANIZED HEALTH CARE EDUCATION/TRAINING PROGRAM

## 2022-07-31 PROCEDURE — 96375 TX/PRO/DX INJ NEW DRUG ADDON: CPT

## 2022-07-31 PROCEDURE — 83735 ASSAY OF MAGNESIUM: CPT

## 2022-07-31 PROCEDURE — 2500000003 HC RX 250 WO HCPCS: Performed by: STUDENT IN AN ORGANIZED HEALTH CARE EDUCATION/TRAINING PROGRAM

## 2022-07-31 PROCEDURE — 87086 URINE CULTURE/COLONY COUNT: CPT

## 2022-07-31 PROCEDURE — 2580000003 HC RX 258: Performed by: STUDENT IN AN ORGANIZED HEALTH CARE EDUCATION/TRAINING PROGRAM

## 2022-07-31 PROCEDURE — 99285 EMERGENCY DEPT VISIT HI MDM: CPT

## 2022-07-31 PROCEDURE — 2580000003 HC RX 258

## 2022-07-31 PROCEDURE — 74018 RADEX ABDOMEN 1 VIEW: CPT

## 2022-07-31 PROCEDURE — 93005 ELECTROCARDIOGRAM TRACING: CPT | Performed by: STUDENT IN AN ORGANIZED HEALTH CARE EDUCATION/TRAINING PROGRAM

## 2022-07-31 PROCEDURE — 80053 COMPREHEN METABOLIC PANEL: CPT

## 2022-07-31 PROCEDURE — 36415 COLL VENOUS BLD VENIPUNCTURE: CPT

## 2022-07-31 PROCEDURE — 1200000000 HC SEMI PRIVATE

## 2022-07-31 RX ORDER — SENNA PLUS 8.6 MG/1
2 TABLET ORAL NIGHTLY
Status: DISCONTINUED | OUTPATIENT
Start: 2022-07-31 | End: 2022-08-05 | Stop reason: HOSPADM

## 2022-07-31 RX ORDER — POLYETHYLENE GLYCOL 3350 17 G/17G
17 POWDER, FOR SOLUTION ORAL DAILY PRN
Status: DISCONTINUED | OUTPATIENT
Start: 2022-07-31 | End: 2022-08-03

## 2022-07-31 RX ORDER — HYDROCODONE BITARTRATE AND ACETAMINOPHEN 5; 325 MG/1; MG/1
1 TABLET ORAL EVERY 6 HOURS PRN
Status: DISCONTINUED | OUTPATIENT
Start: 2022-07-31 | End: 2022-08-03

## 2022-07-31 RX ORDER — ALBUTEROL SULFATE 2.5 MG/3ML
2.5 SOLUTION RESPIRATORY (INHALATION) EVERY 6 HOURS PRN
COMMUNITY
End: 2022-09-01

## 2022-07-31 RX ORDER — BUSPIRONE HYDROCHLORIDE 10 MG/1
10 TABLET ORAL 2 TIMES DAILY
Status: DISCONTINUED | OUTPATIENT
Start: 2022-07-31 | End: 2022-08-05 | Stop reason: HOSPADM

## 2022-07-31 RX ORDER — SODIUM CHLORIDE 9 MG/ML
INJECTION, SOLUTION INTRAVENOUS CONTINUOUS
Status: DISCONTINUED | OUTPATIENT
Start: 2022-07-31 | End: 2022-08-05 | Stop reason: HOSPADM

## 2022-07-31 RX ORDER — ATORVASTATIN CALCIUM 20 MG/1
20 TABLET, FILM COATED ORAL DAILY
Status: DISCONTINUED | OUTPATIENT
Start: 2022-07-31 | End: 2022-08-05 | Stop reason: HOSPADM

## 2022-07-31 RX ORDER — ONDANSETRON 2 MG/ML
4 INJECTION INTRAMUSCULAR; INTRAVENOUS EVERY 6 HOURS PRN
Status: DISCONTINUED | OUTPATIENT
Start: 2022-07-31 | End: 2022-08-05 | Stop reason: HOSPADM

## 2022-07-31 RX ORDER — ACETAMINOPHEN 500 MG
1000 TABLET ORAL 2 TIMES DAILY
COMMUNITY

## 2022-07-31 RX ORDER — MORPHINE SULFATE 4 MG/ML
4 INJECTION, SOLUTION INTRAMUSCULAR; INTRAVENOUS ONCE
Status: COMPLETED | OUTPATIENT
Start: 2022-07-31 | End: 2022-07-31

## 2022-07-31 RX ORDER — METOPROLOL SUCCINATE 25 MG/1
25 TABLET, EXTENDED RELEASE ORAL DAILY
Status: DISCONTINUED | OUTPATIENT
Start: 2022-07-31 | End: 2022-08-05 | Stop reason: HOSPADM

## 2022-07-31 RX ORDER — FAMOTIDINE 10 MG/ML
20 INJECTION, SOLUTION INTRAVENOUS ONCE
Status: COMPLETED | OUTPATIENT
Start: 2022-07-31 | End: 2022-07-31

## 2022-07-31 RX ORDER — ESCITALOPRAM OXALATE 10 MG/1
10 TABLET ORAL DAILY
Status: DISCONTINUED | OUTPATIENT
Start: 2022-07-31 | End: 2022-08-05 | Stop reason: HOSPADM

## 2022-07-31 RX ORDER — ONDANSETRON 2 MG/ML
4 INJECTION INTRAMUSCULAR; INTRAVENOUS ONCE
Status: COMPLETED | OUTPATIENT
Start: 2022-07-31 | End: 2022-07-31

## 2022-07-31 RX ORDER — SODIUM CHLORIDE 0.9 % (FLUSH) 0.9 %
5-40 SYRINGE (ML) INJECTION PRN
Status: DISCONTINUED | OUTPATIENT
Start: 2022-07-31 | End: 2022-08-05 | Stop reason: HOSPADM

## 2022-07-31 RX ORDER — ONDANSETRON 4 MG/1
4 TABLET, ORALLY DISINTEGRATING ORAL EVERY 8 HOURS PRN
Status: DISCONTINUED | OUTPATIENT
Start: 2022-07-31 | End: 2022-08-05 | Stop reason: HOSPADM

## 2022-07-31 RX ORDER — ONDANSETRON 4 MG/1
4 TABLET, FILM COATED ORAL EVERY 6 HOURS
COMMUNITY
End: 2022-09-01

## 2022-07-31 RX ORDER — ACETAMINOPHEN 650 MG/1
650 SUPPOSITORY RECTAL EVERY 6 HOURS PRN
Status: DISCONTINUED | OUTPATIENT
Start: 2022-07-31 | End: 2022-08-05 | Stop reason: HOSPADM

## 2022-07-31 RX ORDER — GUAIFENESIN 100 MG/5ML
200 SOLUTION ORAL EVERY 4 HOURS PRN
Status: DISCONTINUED | OUTPATIENT
Start: 2022-07-31 | End: 2022-08-05 | Stop reason: HOSPADM

## 2022-07-31 RX ORDER — 0.9 % SODIUM CHLORIDE 0.9 %
1000 INTRAVENOUS SOLUTION INTRAVENOUS ONCE
Status: COMPLETED | OUTPATIENT
Start: 2022-07-31 | End: 2022-07-31

## 2022-07-31 RX ORDER — HYDRALAZINE HYDROCHLORIDE 20 MG/ML
10 INJECTION INTRAMUSCULAR; INTRAVENOUS EVERY 6 HOURS PRN
Status: DISCONTINUED | OUTPATIENT
Start: 2022-07-31 | End: 2022-08-05 | Stop reason: HOSPADM

## 2022-07-31 RX ORDER — CLONAZEPAM 0.5 MG/1
0.5 TABLET ORAL 2 TIMES DAILY PRN
Status: DISCONTINUED | OUTPATIENT
Start: 2022-07-31 | End: 2022-08-03

## 2022-07-31 RX ORDER — HEPARIN SODIUM 5000 [USP'U]/ML
5000 INJECTION, SOLUTION INTRAVENOUS; SUBCUTANEOUS EVERY 8 HOURS SCHEDULED
Status: DISCONTINUED | OUTPATIENT
Start: 2022-07-31 | End: 2022-08-04

## 2022-07-31 RX ORDER — SODIUM CHLORIDE 0.9 % (FLUSH) 0.9 %
5-40 SYRINGE (ML) INJECTION EVERY 12 HOURS SCHEDULED
Status: DISCONTINUED | OUTPATIENT
Start: 2022-07-31 | End: 2022-08-05 | Stop reason: HOSPADM

## 2022-07-31 RX ORDER — METOCLOPRAMIDE 5 MG/1
5 TABLET ORAL 4 TIMES DAILY
Status: DISCONTINUED | OUTPATIENT
Start: 2022-07-31 | End: 2022-08-05 | Stop reason: HOSPADM

## 2022-07-31 RX ORDER — SODIUM CHLORIDE 9 MG/ML
INJECTION, SOLUTION INTRAVENOUS PRN
Status: DISCONTINUED | OUTPATIENT
Start: 2022-07-31 | End: 2022-08-05 | Stop reason: HOSPADM

## 2022-07-31 RX ORDER — BUDESONIDE AND FORMOTEROL FUMARATE DIHYDRATE 160; 4.5 UG/1; UG/1
2 AEROSOL RESPIRATORY (INHALATION) 2 TIMES DAILY
Status: DISCONTINUED | OUTPATIENT
Start: 2022-07-31 | End: 2022-08-05 | Stop reason: HOSPADM

## 2022-07-31 RX ORDER — ONDANSETRON 2 MG/ML
INJECTION INTRAMUSCULAR; INTRAVENOUS
Status: COMPLETED
Start: 2022-07-31 | End: 2022-07-31

## 2022-07-31 RX ORDER — ACETAMINOPHEN 500 MG
1000 TABLET ORAL ONCE
Status: COMPLETED | OUTPATIENT
Start: 2022-07-31 | End: 2022-07-31

## 2022-07-31 RX ORDER — ALBUTEROL SULFATE 2.5 MG/3ML
2.5 SOLUTION RESPIRATORY (INHALATION) EVERY 6 HOURS PRN
Status: DISCONTINUED | OUTPATIENT
Start: 2022-07-31 | End: 2022-08-05 | Stop reason: HOSPADM

## 2022-07-31 RX ORDER — ACETAMINOPHEN 325 MG/1
650 TABLET ORAL EVERY 6 HOURS PRN
Status: DISCONTINUED | OUTPATIENT
Start: 2022-07-31 | End: 2022-08-05 | Stop reason: HOSPADM

## 2022-07-31 RX ADMIN — ESCITALOPRAM OXALATE 10 MG: 10 TABLET ORAL at 20:33

## 2022-07-31 RX ADMIN — HYDROCODONE BITARTRATE AND ACETAMINOPHEN 1 TABLET: 5; 325 TABLET ORAL at 20:32

## 2022-07-31 RX ADMIN — ATORVASTATIN CALCIUM 20 MG: 20 TABLET, FILM COATED ORAL at 20:33

## 2022-07-31 RX ADMIN — SENNOSIDES 17.2 MG: 8.6 TABLET, COATED ORAL at 20:33

## 2022-07-31 RX ADMIN — SODIUM CHLORIDE: 9 INJECTION, SOLUTION INTRAVENOUS at 20:32

## 2022-07-31 RX ADMIN — MORPHINE SULFATE 4 MG: 4 INJECTION INTRAVENOUS at 14:49

## 2022-07-31 RX ADMIN — FAMOTIDINE 20 MG: 10 INJECTION, SOLUTION INTRAVENOUS at 15:22

## 2022-07-31 RX ADMIN — ONDANSETRON 4 MG: 2 INJECTION INTRAMUSCULAR; INTRAVENOUS at 15:23

## 2022-07-31 RX ADMIN — METOCLOPRAMIDE 5 MG: 5 TABLET ORAL at 20:33

## 2022-07-31 RX ADMIN — SODIUM CHLORIDE, PRESERVATIVE FREE 10 ML: 5 INJECTION INTRAVENOUS at 20:33

## 2022-07-31 RX ADMIN — CLONAZEPAM 0.5 MG: 0.5 TABLET ORAL at 20:32

## 2022-07-31 RX ADMIN — ACETAMINOPHEN 1000 MG: 500 TABLET ORAL at 15:01

## 2022-07-31 RX ADMIN — METOPROLOL SUCCINATE 25 MG: 25 TABLET, FILM COATED, EXTENDED RELEASE ORAL at 20:33

## 2022-07-31 RX ADMIN — ONDANSETRON 4 MG: 2 INJECTION INTRAMUSCULAR; INTRAVENOUS at 15:04

## 2022-07-31 RX ADMIN — HEPARIN SODIUM 5000 UNITS: 5000 INJECTION INTRAVENOUS; SUBCUTANEOUS at 20:34

## 2022-07-31 RX ADMIN — ACETAMINOPHEN 650 MG: 325 TABLET ORAL at 22:51

## 2022-07-31 RX ADMIN — MAGNESIUM HYDROXIDE 30 ML: 400 SUSPENSION ORAL at 20:34

## 2022-07-31 RX ADMIN — GUAIFENESIN 200 MG: 200 SOLUTION ORAL at 20:34

## 2022-07-31 RX ADMIN — SODIUM CHLORIDE 1000 ML: 9 INJECTION, SOLUTION INTRAVENOUS at 14:54

## 2022-07-31 RX ADMIN — ONDANSETRON 4 MG: 2 INJECTION INTRAMUSCULAR; INTRAVENOUS at 20:34

## 2022-07-31 RX ADMIN — BUSPIRONE HYDROCHLORIDE 10 MG: 10 TABLET ORAL at 20:33

## 2022-07-31 ASSESSMENT — PAIN - FUNCTIONAL ASSESSMENT
PAIN_FUNCTIONAL_ASSESSMENT: PREVENTS OR INTERFERES SOME ACTIVE ACTIVITIES AND ADLS
PAIN_FUNCTIONAL_ASSESSMENT: 0-10
PAIN_FUNCTIONAL_ASSESSMENT: ACTIVITIES ARE NOT PREVENTED

## 2022-07-31 ASSESSMENT — PAIN DESCRIPTION - LOCATION
LOCATION: HEAD
LOCATION: ABDOMEN
LOCATION: ABDOMEN

## 2022-07-31 ASSESSMENT — PAIN DESCRIPTION - PAIN TYPE: TYPE: ACUTE PAIN

## 2022-07-31 ASSESSMENT — PAIN DESCRIPTION - ORIENTATION
ORIENTATION: MID;LOWER
ORIENTATION: UPPER

## 2022-07-31 ASSESSMENT — ENCOUNTER SYMPTOMS
COLOR CHANGE: 0
VOICE CHANGE: 0
CONSTIPATION: 1
EYE DISCHARGE: 0
ABDOMINAL DISTENTION: 1
TROUBLE SWALLOWING: 0
CHEST TIGHTNESS: 0
CHOKING: 0
EYE ITCHING: 0
BACK PAIN: 1
ABDOMINAL PAIN: 1
VOMITING: 1
NAUSEA: 1
APNEA: 0
EYE PAIN: 0

## 2022-07-31 ASSESSMENT — PAIN SCALES - GENERAL
PAINLEVEL_OUTOF10: 8
PAINLEVEL_OUTOF10: 3
PAINLEVEL_OUTOF10: 7
PAINLEVEL_OUTOF10: 3
PAINLEVEL_OUTOF10: 7

## 2022-07-31 ASSESSMENT — PAIN DESCRIPTION - DESCRIPTORS
DESCRIPTORS: ACHING;CRAMPING
DESCRIPTORS: ACHING

## 2022-07-31 ASSESSMENT — PAIN DESCRIPTION - ONSET: ONSET: ON-GOING

## 2022-07-31 ASSESSMENT — PAIN DESCRIPTION - FREQUENCY: FREQUENCY: CONTINUOUS

## 2022-07-31 NOTE — ED NOTES
Pt arrives to ED from long term care facility. Pt c/o abdominal pain that started earlier in the week and body aches that started last night. Pt has fever of 101.3 F. Pt also reports new onset of confusion that also started last night. Pt son states she has frequent UTI's. Pt states last BM was x3 days ago. Pt rates abd pain as an 8/10. Pt is alert, oriented, and speaking in full, clear sentences. Son at bedside. Pt resting on cot with full monitor on.       Janes Tompkins RN  07/31/22 [id number]

## 2022-07-31 NOTE — ED NOTES
Pt oxygen level 90% on room air, placed on 2L via NC, increased to 94% on room air.       Christelle Ramirez RN  07/31/22 [id number]

## 2022-07-31 NOTE — ED PROVIDER NOTES
[facility] ONC/MED SURG  Emergency Department Encounter  Emergency Medicine Resident     Pt Name: Yoselyn Vasques  MNU:[id number]  [place] 1945  Date of evaluation: 7/31/22  PCP:  No primary care provider on file. CHIEF COMPLAINT       Chief Complaint   Patient presents with    Abdominal Pain     Started yesterday, no BM in 3 days    Generalized Body Aches       HISTORY OF PRESENT ILLNESS  (Location/Symptom, Timing/Onset, Context/Setting, Quality, Duration, ModifyingFactors, Severity.)      Yoselyn Vasques is a 68 y.o. female presents to the emergency department brought in from her facility. Patient has history of recurrent UTIs, single kidney and sepsis secondary to her UTIs. Patient was febrile at her facility, acting mildly off for her son came to visit, that she was struggling with word finding and mild perseveration. Both patient and the son endorse that this is exactly how she gets when she gets UTIs. States that she has had UTIs since he is been 13years old secondary to her kidney issues. And states that she has multiple times of UTIs and sepsis as an adult. Denies any injury, trauma, falls, patient states that she is normally lucid just having some difficulty with her word finding. Patient denies any chest pain but states that there is diffuse abdominal pain specifically in the lower quadrants that is an 8 out of 10 is causing her to be nauseous. In addition, states that she has not had a bowel movement in 3 days and felt extremely bloated.     PAST MEDICAL / SURGICAL / SOCIAL /FAMILY HISTORY      has a past medical history of Anxiety, Arthritis, Back pain, Bronchitis, Caffeine use, Carpal tunnel syndrome, Cataracts, bilateral, Constipation, COPD (chronic obstructive pulmonary disease) (HCC), Depression, Diarrhea, GERD (gastroesophageal reflux disease), H/O gastric ulcer, Hyperlipidemia, Hypertension, Mumps, MVP (mitral valve prolapse), Recurrent UTI, Sinusitis, Tracheostomy in place [facility]), Ulcerative esophagitis, and Wellness examination. No other pertinent PMH on review with patient/guardian. has a past surgical history that includes Hysterectomy; total nephrectomy; Tonsillectomy; Appendectomy; Cystoscopy; Ovary removal; Tubal ligation; rhinoplasty; Carpal tunnel release; Hand surgery; Total hip arthroplasty; eye surgery; Colonoscopy; joint replacement (Right); Gastric fundoplication (N/A, 74/58/3169); hc cath power picc triple (03/04/2020); Upper gastrointestinal endoscopy (N/A, 03/17/2020); tracheostomy (N/A, 03/27/2020); Gastrostomy tube placement (N/A, 03/27/2020); tracheostomy (N/A, 04/02/2020); Upper gastrointestinal endoscopy (N/A, 05/18/2020); Upper gastrointestinal endoscopy (05/18/2020); ERCP (05/21/2020); ERCP (05/21/2020); ERCP (05/21/2020); ERCP (05/21/2020); Cholecystectomy, laparoscopic (N/A, 05/22/2020); Upper gastrointestinal endoscopy (N/A, 07/06/2020); Upper gastrointestinal endoscopy (N/A, 11/09/2020); Upper gastrointestinal endoscopy (02/08/2021); Gastrostomy tube placement (N/A, 2/8/2021); ERCP (N/A, 2/8/2021); ERCP (2/8/2021); Upper gastrointestinal endoscopy (N/A, 11/24/2021); hernia repair; Colonoscopy (N/A, 1/28/2022); and IR NONTUNNELED VASCULAR CATHETER > 5 YEARS (5/10/2022). No other pertinent PSH on review with patient/guardian. Social History     Socioeconomic History    Marital status:       Spouse name: Not on file    Number of children: 2    Years of education: Not on file    Highest education level: Not on file   Occupational History    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   Tobacco Use    Smoking status: Former     Packs/day: 1.00     Years: 50.00     Pack years: 50.00     Types: Cigarettes    Smokeless tobacco: Never    Tobacco comments:     quit 1 year ago    Vaping Use    Vaping Use: Former    Substances: Always   Substance and Sexual Activity    Alcohol use: No    Drug use: No    Sexual activity: Never   Other Topics Concern    Not on file   Social History Yes Historical Provider, MD   clotrimazole-betamethasone (LOTRISONE) 1-0.05 % cream Apply topically 2 times daily Apply topically 2 times daily. 5/10/22   Megan Ibrahimsin,    Cholecalciferol (VITAMIN D) 50 MCG (2000 UT) CAPS capsule Take 1 capsule by mouth daily 5/10/22   Joy Durango DO Sofy   guaiFENesin (MUCINEX) 600 MG extended release tablet Take 400 mg by mouth in the morning. Indications: Cough. As needed for mucus. Historical Provider, MD   Lactobacillus TABS Take 1 tablet by mouth 2 times daily Take One tablet by mouth two times daily    Historical Provider, MD   fluticasone (FLONASE) 50 MCG/ACT nasal spray 1 spray by Each Nostril route daily    Historical Provider, MD   loratadine (CLARITIN) 10 MG capsule Take 10 mg by mouth daily    Historical Provider, MD   clonazePAM (KLONOPIN) 0.5 MG tablet Take 0.5 mg by mouth 2 times daily as needed. Historical Provider, MD   bisacodyl (DULCOLAX) 5 MG EC tablet TAKE 2 TABS AS INSTRUCTED BY THE OFFICE THE DAY PRIOR TO COLONOSCOPY 1/5/22   Antoine Houser MD   metoclopramide (REGLAN) 5 MG tablet Take 1 tablet by mouth 4 times daily 1/4/22   Antoine Houser MD   polyethylene glycol (GLYCOLAX) 17 g packet Take 17 g by mouth daily as needed for Constipation 12/19/21   Cordelia Del Rio MD   QUEtiapine (SEROQUEL) 100 MG tablet Take 1 tablet by mouth nightly for 3 days 12/19/21 12/22/21  Cordelia Del Rio MD   QUEtiapine (SEROQUEL) 25 MG tablet Take 1 tablet by mouth every morning (before breakfast) for 3 days 12/19/21 12/22/21  Cordelia Del Rio MD   gabapentin (NEURONTIN) 300 MG capsule Take 1 capsule by mouth 3 times daily for 3 days.  12/19/21 1/28/22  Cordelia Del Rio MD   escitalopram (LEXAPRO) 10 MG tablet Take 10 mg by mouth daily    Historical Provider, MD   Folic Acid-Cholecalciferol 1-2000 MG-UNIT TABS Take by mouth    Historical Provider, MD   busPIRone (BUSPAR) 10 MG tablet Take 1 tablet by mouth 2 times daily 11/30/21   Shae Wood MD Gastrointestinal:  Positive for abdominal distention, abdominal pain, constipation, nausea and vomiting. Endocrine: Negative for cold intolerance and heat intolerance. Genitourinary:  Positive for dysuria and urgency. Musculoskeletal:  Positive for back pain. Negative for arthralgias. Skin:  Negative for color change, pallor, rash and wound. Allergic/Immunologic: Negative for immunocompromised state. Neurological:  Negative for dizziness, facial asymmetry and headaches. Psychiatric/Behavioral:  Positive for confusion. Negative for agitation and behavioral problems. PHYSICAL EXAM   (up to 7 for level 4, 8 or more for level 5)      INITIAL VITALS:   /73   Pulse 85   Temp 98.4 °F (36.9 °C) (Oral)   Resp 17   Ht 5' 2\" (1.575 m)   Wt 179 lb (81.2 kg)   SpO2 92%   BMI 32.74 kg/m²     Physical Exam  Vitals reviewed. Constitutional:       Appearance: She is ill-appearing. HENT:      Head: Normocephalic and atraumatic. Nose: Nose normal.      Mouth/Throat:      Mouth: Mucous membranes are moist.      Pharynx: Oropharynx is clear. Eyes:      Extraocular Movements: Extraocular movements intact. Conjunctiva/sclera: Conjunctivae normal.      Pupils: Pupils are equal, round, and reactive to light. Cardiovascular:      Rate and Rhythm: Normal rate and regular rhythm. Pulses: Normal pulses. Heart sounds: Normal heart sounds. Pulmonary:      Effort: Pulmonary effort is normal.      Breath sounds: Normal breath sounds. Abdominal:      General: There is distension. Tenderness: There is abdominal tenderness. There is no guarding or rebound. Musculoskeletal:         General: No swelling, tenderness, deformity or signs of injury. Normal range of motion. Cervical back: Normal range of motion and neck supple. Skin:     General: Skin is warm and dry. Capillary Refill: Capillary refill takes less than 2 seconds. Coloration: Skin is not jaundiced. Findings: No bruising. Neurological:      General: No focal deficit present. Mental Status: She is alert. Mental status is at baseline. Psychiatric:         Mood and Affect: Mood normal.         Thought Content: Thought content normal.       DIFFERENTIAL  DIAGNOSIS     DDX: Bowel obstruction, urosepsis, abdominal perforation, ACS, intracranial hemorrhage    PLAN (LABS / IMAGING / EKG):  Orders Placed This Encounter   Procedures    Culture, Blood 1    Culture, Blood 1    Culture, Urine    XR CHEST PORTABLE    CT ABDOMEN PELVIS WO CONTRAST Additional Contrast? None    CT HEAD WO CONTRAST    XR ABDOMEN (KUB) (SINGLE AP VIEW)    US RENAL COMPLETE    CBC with Auto Differential    Comprehensive Metabolic Panel    Lactate, Sepsis    Magnesium    Procalcitonin    Urinalysis with Microscopic    Troponin    Basic Metabolic Panel w/ Reflex to MG    CBC with Auto Differential    Immature Platelet Fraction    Hemoglobin and Hematocrit    Immature Platelet Fraction    ADULT DIET;  Clear Liquid    Straight cath    Bladder scan    Vital signs per unit routine    Telemetry monitoring - continuous duration    Notify physician    Up as tolerated    Tap water enema    Full Code    Inpatient consult to Internal Medicine    Inpatient consult to Case Management    Inpatient consult to GI    Inpatient consult to GI    Inpatient consult to Infectious Diseases    Contact isolation    OT eval and treat    PT evaluation and treat    Initiate Oxygen Therapy Protocol    Nasal Cannula Oxygen    EKG 12 Lead    ADMIT TO INPATIENT    ADMIT TO INPATIENT       MEDICATIONS ORDERED:  Orders Placed This Encounter   Medications    morphine injection 4 mg    0.9 % sodium chloride bolus    acetaminophen (TYLENOL) tablet 1,000 mg    ondansetron (ZOFRAN) injection 4 mg    ondansetron (ZOFRAN) injection 4 mg    ondansetron (ZOFRAN) 4 MG/2ML injection     Lakhwinder, Deepa: cabinet override    famotidine (PEPCID) injection 20 mg    DISCONTD: cefepime (MAXIPIME) 1000 mg IVPB minibag     Order Specific Question:   Antimicrobial Indications     Answer:   Urinary Tract Infection    albuterol (PROVENTIL) nebulizer solution 2.5 mg     Order Specific Question:   Initiate RT Bronchodilator Protocol     Answer:   Yes - Inpatient Protocol    budesonide-formoterol (SYMBICORT) 160-4.5 MCG/ACT inhaler 2 puff    busPIRone (BUSPAR) tablet 10 mg    clonazePAM (KLONOPIN) tablet 0.5 mg    escitalopram (LEXAPRO) tablet 10 mg    metoclopramide (REGLAN) tablet 5 mg    atorvastatin (LIPITOR) tablet 20 mg    sodium chloride flush 0.9 % injection 5-40 mL    sodium chloride flush 0.9 % injection 5-40 mL    0.9 % sodium chloride infusion    OR Linked Order Group     ondansetron (ZOFRAN-ODT) disintegrating tablet 4 mg     ondansetron (ZOFRAN) injection 4 mg    polyethylene glycol (GLYCOLAX) packet 17 g    OR Linked Order Group     acetaminophen (TYLENOL) tablet 650 mg     acetaminophen (TYLENOL) suppository 650 mg    0.9 % sodium chloride infusion    heparin (porcine) injection 5,000 Units    DISCONTD: cefepime (MAXIPIME) 2,000 mg in sterile water 20 mL IV syringe     Order Specific Question:   Antimicrobial Indications     Answer:   Urinary Tract Infection     Order Specific Question:   UTI duration of therapy     Answer:   7 days    metoprolol succinate (TOPROL XL) extended release tablet 25 mg    hydrALAZINE (APRESOLINE) injection 10 mg    guaiFENesin (ROBITUSSIN) 100 MG/5ML oral solution 200 mg    magnesium hydroxide (MILK OF MAGNESIA) 400 MG/5ML suspension 30 mL    senna (SENOKOT) tablet 17.2 mg    HYDROcodone-acetaminophen (NORCO) 5-325 MG per tablet 1 tablet    calcium carbonate (TUMS) chewable tablet 500 mg    pantoprazole (PROTONIX) 80 mg in sodium chloride 0.9 % 50 mL bolus    DISCONTD: pantoprazole (PROTONIX) 80 mg in sodium chloride 0.9 % 100 mL infusion    morphine (PF) injection 2 mg    DISCONTD: ciprofloxacin (CIPRO) IVPB 400 mg     Order Specific Question:   Antimicrobial Indications     Answer:   Urinary Tract Infection     Order Specific Question:   UTI duration of therapy     Answer:   7 days    meropenem (MERREM) 1,000 mg in sodium chloride 0.9 % 100 mL IVPB (mini-bag)     H/o ESBL UTI     Order Specific Question:   Antimicrobial Indications     Answer:   Urinary Tract Infection     Order Specific Question:   UTI duration of therapy     Answer:   5 days    DISCONTD: LORazepam (ATIVAN) injection 0.5 mg    DISCONTD: midazolam PF (VERSED) injection 0.5 mg    bisacodyl (DULCOLAX) suppository 10 mg    pantoprazole (PROTONIX) 40 mg in sodium chloride (PF) 10 mL injection    ALPRAZolam (XANAX) tablet 0.25 mg    alteplase (CATHFLO) injection 1 mg    diphenhydrAMINE (BENADRYL) injection 25 mg    QUEtiapine (SEROQUEL) tablet 100 mg    traZODone (DESYREL) tablet 100 mg           DIAGNOSTIC RESULTS / EMERGENCY DEPARTMENT COURSE / MDM     LABS:  Results for orders placed or performed during the hospital encounter of 07/31/22   Culture, Blood 1    Specimen: Blood   Result Value Ref Range    Specimen Description . BLOOD     Special Requests R AC      Culture NO GROWTH 1 DAY    Culture, Blood 1    Specimen: Blood   Result Value Ref Range    Specimen Description . BLOOD     Special Requests UNKNOWN     Culture NO GROWTH 1 DAY    Culture, Urine    Specimen: Urine, straight catheter   Result Value Ref Range    Specimen Description . URINE,STRAIGHT CATHETER     Culture KLEBSIELLA PNEUMONIAE >[id number] CFU/ML (A)    CBC with Auto Differential   Result Value Ref Range    WBC 6.0 3.5 - 11.3 k/uL    RBC 5.06 3.95 - 5.11 m/uL    Hemoglobin 15.1 11.9 - 15.1 g/dL    Hematocrit 44.3 36.3 - 47.1 %    MCV 87.5 82.6 - 102.9 fL    MCH 29.8 25.2 - 33.5 pg    MCHC 34.1 28.4 - 34.8 g/dL    RDW 15.3 (H) 11.8 - 14.4 %    Platelets 152 522 - 239 k/uL    MPV 12.0 8.1 - 13.5 fL    NRBC Automated 0.0 0.0 per 100 WBC    Immature Granulocytes 1 (H) 0 %    Seg Neutrophils 78 (H) 36 - 66 %    Lymphocytes 11 (L) 24 - 44 % Monocytes 8 (H) 1 - 7 %    Eosinophils % 1 1 - 4 %    Basophils 1 0 - 2 %    Absolute Immature Granulocyte 0.06 0.00 - 0.30 k/uL    Segs Absolute 4.68 1.8 - 7.7 k/uL    Absolute Lymph # 0.66 (L) 1.0 - 4.8 k/uL    Absolute Mono # 0.48 0.1 - 0.8 k/uL    Absolute Eos # 0.06 0.0 - 0.4 k/uL    Basophils Absolute 0.06 0.0 - 0.2 k/uL    Morphology ANISOCYTOSIS PRESENT     Morphology LARGE PLATELETS PRESENT    Comprehensive Metabolic Panel   Result Value Ref Range    Glucose 112 (H) 70 - 99 mg/dL    BUN 17 8 - 23 mg/dL    Creatinine 1.03 (H) 0.50 - 0.90 mg/dL    Calcium 9.6 8.6 - 10.4 mg/dL    Sodium 132 (L) 135 - 144 mmol/L    Potassium 4.8 3.7 - 5.3 mmol/L    Chloride 95 (L) 98 - 107 mmol/L    CO2 23 20 - 31 mmol/L    Anion Gap 14 9 - 17 mmol/L    Alkaline Phosphatase 131 (H) 35 - 104 U/L    ALT 37 (H) 5 - 33 U/L    AST 50 (H) <32 U/L    Total Bilirubin 0.27 (L) 0.3 - 1.2 mg/dL    Total Protein 7.3 6.4 - 8.3 g/dL    Albumin 4.3 3.5 - 5.2 g/dL    Albumin/Globulin Ratio 1.4 1.0 - 2.5    GFR Non-African American 52 (L) >60 mL/min    GFR African American >60 >60 mL/min    GFR Comment         Lactate, Sepsis   Result Value Ref Range    Lactic Acid, Sepsis, Whole Blood 2.1 (H) 0.5 - 1.9 mmol/L   Lactate, Sepsis   Result Value Ref Range    Lactic Acid, Sepsis, Whole Blood 1.2 0.5 - 1.9 mmol/L   Magnesium   Result Value Ref Range    Magnesium 1.7 1.6 - 2.6 mg/dL   Procalcitonin   Result Value Ref Range    Procalcitonin 0.30 (H) <0.09 ng/mL   Urinalysis with Microscopic   Result Value Ref Range    Color, UA Yellow Yellow    Turbidity UA Clear Clear    Glucose, Ur NEGATIVE NEGATIVE    Bilirubin Urine NEGATIVE NEGATIVE    Ketones, Urine NEGATIVE NEGATIVE    Specific Gravity, UA 1.015 1.005 - 1.030    Urine Hgb SMALL (A) NEGATIVE    pH, UA 8.5 (H) 5.0 - 8.0    Protein, UA NEGATIVE  Verified by sulfosalicylic acid test. (A) NEGATIVE    Urobilinogen, Urine Normal Normal    Nitrite, Urine NEGATIVE NEGATIVE    Leukocyte Esterase, Urine TRACE (A) NEGATIVE    -          WBC, UA 5 TO 10 0 - 5 /HPF    RBC, UA 5 TO 10 0 - 4 /HPF    Epithelial Cells UA 0 TO 2 0 - 5 /HPF    Bacteria, UA MANY (A) None   Troponin   Result Value Ref Range    Troponin, High Sensitivity 16 (H) 0 - 14 ng/L   Basic Metabolic Panel w/ Reflex to MG   Result Value Ref Range    Glucose 110 (H) 70 - 99 mg/dL    BUN 16 8 - 23 mg/dL    Creatinine 0.95 (H) 0.50 - 0.90 mg/dL    Calcium 8.8 8.6 - 10.4 mg/dL    Sodium 135 135 - 144 mmol/L    Potassium 4.3 3.7 - 5.3 mmol/L    Chloride 100 98 - 107 mmol/L    CO2 24 20 - 31 mmol/L    Anion Gap 11 9 - 17 mmol/L    GFR Non-African American 57 (L) >60 mL/min    GFR African American >60 >60 mL/min    GFR Comment         CBC with Auto Differential   Result Value Ref Range    WBC 3.9 3.5 - 11.3 k/uL    RBC 4.99 3.95 - 5.11 m/uL    Hemoglobin 14.9 11.9 - 15.1 g/dL    Hematocrit 46.7 36.3 - 47.1 %    MCV 93.6 82.6 - 102.9 fL    MCH 29.9 25.2 - 33.5 pg    MCHC 31.9 28.4 - 34.8 g/dL    RDW 15.7 (H) 11.8 - 14.4 %    Platelets See Reflexed IPF Result 138 - 453 k/uL    NRBC Automated 0.0 0.0 per 100 WBC    Immature Granulocytes 2 (H) 0 %    Seg Neutrophils 79 (H) 36 - 66 %    Lymphocytes 11 (L) 24 - 44 %    Monocytes 8 (H) 1 - 7 %    Eosinophils % 0 (L) 1 - 4 %    Basophils 0 0 - 2 %    Absolute Immature Granulocyte 0.08 0.00 - 0.30 k/uL    Segs Absolute 3.08 1.8 - 7.7 k/uL    Absolute Lymph # 0.43 (L) 1.0 - 4.8 k/uL    Absolute Mono # 0.31 0.1 - 0.8 k/uL    Absolute Eos # 0.00 0.0 - 0.4 k/uL    Basophils Absolute 0.00 0.0 - 0.2 k/uL    Morphology ANISOCYTOSIS PRESENT    Immature Platelet Fraction   Result Value Ref Range    Platelet, Immature Fraction 4.1 1.1 - 10.3 %    Platelet, Fluorescence 127 (L) 138 - 453 k/uL   Hemoglobin and Hematocrit   Result Value Ref Range    Hemoglobin 14.8 11.9 - 15.1 g/dL    Hematocrit 45.9 36.3 - 47.1 %   Hemoglobin and Hematocrit   Result Value Ref Range    Hemoglobin 15.0 11.9 - 15.1 g/dL    Hematocrit 47.5 (H) 36.3 - 47.1 %   Hemoglobin and Hematocrit   Result Value Ref Range    Hemoglobin 14.0 11.9 - 15.1 g/dL    Hematocrit 42.6 36.3 - 47.1 %   Basic Metabolic Panel w/ Reflex to MG   Result Value Ref Range    Glucose 91 70 - 99 mg/dL    BUN 12 8 - 23 mg/dL    Creatinine 0.86 0.50 - 0.90 mg/dL    Calcium 8.7 8.6 - 10.4 mg/dL    Sodium 134 (L) 135 - 144 mmol/L    Potassium 4.0 3.7 - 5.3 mmol/L    Chloride 100 98 - 107 mmol/L    CO2 21 20 - 31 mmol/L    Anion Gap 13 9 - 17 mmol/L    GFR Non-African American >60 >60 mL/min    GFR African American >60 >60 mL/min    GFR Comment         CBC with Auto Differential   Result Value Ref Range    WBC 4.7 3.5 - 11.3 k/uL    RBC 4.40 3.95 - 5.11 m/uL    Hemoglobin 13.0 11.9 - 15.1 g/dL    Hematocrit 40.6 36.3 - 47.1 %    MCV 92.3 82.6 - 102.9 fL    MCH 29.5 25.2 - 33.5 pg    MCHC 32.0 28.4 - 34.8 g/dL    RDW 15.8 (H) 11.8 - 14.4 %    Platelets See Reflexed IPF Result 138 - 453 k/uL    NRBC Automated 0.0 0.0 per 100 WBC    RBC Morphology ANISOCYTOSIS PRESENT     Seg Neutrophils 70 (H) 36 - 65 %    Lymphocytes 16 (L) 24 - 43 %    Monocytes 11 3 - 12 %    Eosinophils % 2 1 - 4 %    Basophils 1 0 - 2 %    Immature Granulocytes 1 (H) 0 %    Segs Absolute 3.25 1.50 - 8.10 k/uL    Absolute Lymph # 0.73 (L) 1.10 - 3.70 k/uL    Absolute Mono # 0.50 0.10 - 1.20 k/uL    Absolute Eos # 0.09 0.00 - 0.44 k/uL    Basophils Absolute 0.03 0.00 - 0.20 k/uL    Absolute Immature Granulocyte 0.06 0.00 - 0.30 k/uL   Immature Platelet Fraction   Result Value Ref Range    Platelet, Immature Fraction 5.1 1.1 - 10.3 %    Platelet, Fluorescence 113 (L) 138 - 453 k/uL   EKG 12 Lead   Result Value Ref Range    Ventricular Rate 88 BPM    Atrial Rate 88 BPM    P-R Interval 166 ms    QRS Duration 80 ms    Q-T Interval 376 ms    QTc Calculation (Bazett) 454 ms    P Axis 66 degrees    R Axis 26 degrees    T Axis 22 degrees         IMPRESSION/MDM/ED COURSE:  68 y.o. female presented with mildly altered mental status, febrile, distended abdomen with pain and concerns for UTI. We will proceed with septic work-up, concern also for possible bowel obstruction given the lack of bowel movements and gas. Bowel sounds were hyperactive, bedside ultrasound not reveal any ascites or significant intra-abdominal fluid. Aorta was normal in caliber. Because patient was mildly altered we will also get head CT scan to ensure that there is no intracranial hemorrhage. Will pain control with morphine, give Zofran for nausea, Pepcid for GI upset and sent off a single troponin for possible atypical ACS    ED Course as of 08/02/22 1059   Sun Jul 31, 2022   1747 Patient's work-up is complete. Patient antibiotics secondary to her mild pro Luis F in addition with having dirty urine. No obstruction [ES]   1748  on CT scan no bleed on head CT. Will have patient admitted at this time for urosepsis. [ES]      ED Course User Index  [ES] Puneet Martinez MD         RADIOLOGY:  US RENAL COMPLETE   Final Result   1. Tiny 1.2 cm lower pole left renal cortical cyst.   2. No overt left-sided hydronephrosis. Left ureteral jet is visualized. 3. Prior right nephrectomy. XR ABDOMEN (KUB) (SINGLE AP VIEW)   Final Result   Gaseous dilatation of the stomach. Patient may benefit from an NG tube. Moderate constipation of the colon without a large focal bolus in the rectum. The small bowel and colon are not dilated. CT ABDOMEN PELVIS WO CONTRAST Additional Contrast? None   Final Result   1. No acute findings in the abdomen or pelvis. 2. Trace bilateral pleural effusions and trace pericardial effusion. 3. Incidental findings as above for which no dedicated follow-up is   recommended. CT HEAD WO CONTRAST   Final Result   No acute intracranial abnormality. XR CHEST PORTABLE   Final Result   No acute process.                EKG      All EKG's are interpreted by the Emergency Department Physician who either signs or Co-signs this chart in the absence of a cardiologist.      PROCEDURES:  None    CONSULTS:  IP CONSULT TO INTERNAL MEDICINE  IP CONSULT TO CASE MANAGEMENT  IP CONSULT TO GI  IP CONSULT TO GI  IP CONSULT TO INFECTIOUS DISEASES        FINAL IMPRESSION      1. Acute cystitis without hematuria    2. Septicemia ([facility] [address]          DISPOSITION / PLAN       DISPOSITION Admitted 07/31/2022 06:38:11 PM        PATIENT REFERREDTO:  No follow-up provider specified.     DISCHARGE MEDICATIONS:  Current Discharge Medication List          Jennifer Mast MD  PGY 3  Resident Physician Emergency Medicine  08/02/22 10:59 AM        (Please note that portions of this note were completed with a voice recognition program.Efforts were made to edit the dictations but occasionally words are mis-transcribed.)        Jennifer Mast MD  Resident  08/02/22 5948

## 2022-07-31 NOTE — ED NOTES
Pt off of bedpan. Brief changed and pt repositioned. Pt laying on cot with full monitor. Pt denies other needs at this time.      Derik Noble RN  07/31/22 [id number]

## 2022-07-31 NOTE — ED NOTES
Pt placed on bedpan for bowel movement. No urine output in brief or purewick.      Mayra Escobedo RN  07/31/22 [id number]

## 2022-07-31 NOTE — ED NOTES
Pt is resting on cot, full monitor on, and call light in reach. Pt is alert, oriented and speaking in full sentences. Pt son left, gave phone number for questions/updates.      Troy Nunes: [phone]     Mayra Escobedo RN  07/31/22 [id number]

## 2022-07-31 NOTE — ED NOTES
Patient brief changed, pure wick placed, linens changed d/t sweating through and leaking of urine. Patient son at bedside. updated on plan of care.       Clarissa Timmons RN  07/31/22 4102

## 2022-07-31 NOTE — ED PROVIDER NOTES
[facility]     Emergency Department     Faculty Note/ Attestation      Pt Name: Beth Thayer                                       MRN: [id number]  [place] 1945  Date of evaluation: 7/31/2022    Patients PCP:    Saad Lopez MD      Attestation  I performed a history and physical examination of the patient and discussed management with the resident. I reviewed the residents note and agree with the documented findings and plan of care. Any areas of disagreement are noted on the chart. I was personally present for the key portions of any procedures. I have documented in the chart those procedures where I was not present during the key portions. I have reviewed the emergency nurses triage note. I agree with the chief complaint, past medical history, past surgical history, allergies, medications, social and family history as documented unless otherwise noted below. For Physician Assistant/ Nurse Practitioner cases/documentation I have personally evaluated this patient and have completed at least one if not all key elements of the E/M (history, physical exam, and MDM). Additional findings are as noted. Initial Screens:             Vitals: There were no vitals filed for this visit. CHIEF COMPLAINT     No chief complaint on file.             DIAGNOSTIC RESULTS             RADIOLOGY:   No orders to display         LABS:  Labs Reviewed - No data to display      EMERGENCY DEPARTMENT COURSE:     -------------------------   ,  ,  ,        Comments    Septic, altered, febrile, hx of UTIs and abd distention, constipation  AMS - starting today  Coming from ECF    AOx3, oriented and conversant on my exam  Abd distended, tympanic  Febrile    Hx of klebsiella in Urine, sensitive to cefepime    1)  Sepsis Identified at Time:      1500    2)  Sepsis Alert Protocol Guidelines:  Blood Cultures drawn before antibiotics  Broad Spectrum Antibiotics given stat   Lactic Acid Q2 hours times 2 occurrences (if first lactic acid > 2.0)    3)  Fluid Bolus Guidelines:    If lactate > 4.0   OR   MAP less than 65  OR  systolic BP < 90 (two separate readings),            then 30ml/kg crystalloid fluid bolus infused, and may give over 4 hour duration. Is the patient Obese (BMI > 30): No    Body mass index is 32.74 kg/m². Ideal body weight: 50.1 kg (110 lb 7.2 oz)  Adjusted ideal body weight: 62.5 kg (137 lb 13.9 oz)    If Obese the Ideal Body  (Sheri formula): Ideal body weight (IBW) (men) = 50 kg + 2.3 kg x (height, inches - 60)   Ideal body weight (IBW) (women) = 45.5 kg + 2.3 kg x (height, inches - 60)    4)  Repeat Sepsis Exam completed during fluid bolus at time:    1600, pt improving, mentating well, VSS, awaiting CT results     5)  Vasopressors: For persistent hypotension after completion of 30ml/kg fluid bolus (need to measure BP Q 15 min in the hour after completion of fluid bolus).              Discuss risks and benefits of vasopressors and alternative therapies with patient and/or DPOA.      (Please note that portions of this note were completed with a voice recognition program.  Efforts were made to edit the dictations but occasionally words are mis-transcribed.)      Caterina Rodrigues MD,, MD  Attending Emergency Physician         Caterina Rodrigues MD  07/31/22 2371

## 2022-08-01 ENCOUNTER — APPOINTMENT (OUTPATIENT)
Dept: ULTRASOUND IMAGING | Age: 77
DRG: 690 | End: 2022-08-01
Payer: MEDICARE

## 2022-08-01 PROBLEM — A49.9 ESBL (EXTENDED SPECTRUM BETA-LACTAMASE) PRODUCING BACTERIA INFECTION: Status: ACTIVE | Noted: 2022-08-01

## 2022-08-01 PROBLEM — Z16.12 ESBL (EXTENDED SPECTRUM BETA-LACTAMASE) PRODUCING BACTERIA INFECTION: Status: ACTIVE | Noted: 2022-08-01

## 2022-08-01 PROBLEM — N39.0 COMPLICATED UTI (URINARY TRACT INFECTION): Status: ACTIVE | Noted: 2022-08-01

## 2022-08-01 LAB
ABSOLUTE EOS #: 0 K/UL (ref 0–0.4)
ABSOLUTE IMMATURE GRANULOCYTE: 0.08 K/UL (ref 0–0.3)
ABSOLUTE LYMPH #: 0.43 K/UL (ref 1–4.8)
ABSOLUTE MONO #: 0.31 K/UL (ref 0.1–0.8)
ANION GAP SERPL CALCULATED.3IONS-SCNC: 11 MMOL/L (ref 9–17)
BASOPHILS # BLD: 0 % (ref 0–2)
BASOPHILS ABSOLUTE: 0 K/UL (ref 0–0.2)
BUN BLDV-MCNC: 16 MG/DL (ref 8–23)
CALCIUM SERPL-MCNC: 8.8 MG/DL (ref 8.6–10.4)
CHLORIDE BLD-SCNC: 100 MMOL/L (ref 98–107)
CO2: 24 MMOL/L (ref 20–31)
CREAT SERPL-MCNC: 0.95 MG/DL (ref 0.5–0.9)
EKG ATRIAL RATE: 88 BPM
EKG P AXIS: 66 DEGREES
EKG P-R INTERVAL: 166 MS
EKG Q-T INTERVAL: 376 MS
EKG QRS DURATION: 80 MS
EKG QTC CALCULATION (BAZETT): 454 MS
EKG R AXIS: 26 DEGREES
EKG T AXIS: 22 DEGREES
EKG VENTRICULAR RATE: 88 BPM
EOSINOPHILS RELATIVE PERCENT: 0 % (ref 1–4)
GFR AFRICAN AMERICAN: >60 ML/MIN
GFR NON-AFRICAN AMERICAN: 57 ML/MIN
GFR SERPL CREATININE-BSD FRML MDRD: ABNORMAL ML/MIN/{1.73_M2}
GLUCOSE BLD-MCNC: 110 MG/DL (ref 70–99)
HCT VFR BLD CALC: 42.6 % (ref 36.3–47.1)
HCT VFR BLD CALC: 45.9 % (ref 36.3–47.1)
HCT VFR BLD CALC: 46.7 % (ref 36.3–47.1)
HCT VFR BLD CALC: 47.5 % (ref 36.3–47.1)
HEMOGLOBIN: 14 G/DL (ref 11.9–15.1)
HEMOGLOBIN: 14.8 G/DL (ref 11.9–15.1)
HEMOGLOBIN: 14.9 G/DL (ref 11.9–15.1)
HEMOGLOBIN: 15 G/DL (ref 11.9–15.1)
IMMATURE GRANULOCYTES: 2 %
LYMPHOCYTES # BLD: 11 % (ref 24–44)
MCH RBC QN AUTO: 29.9 PG (ref 25.2–33.5)
MCHC RBC AUTO-ENTMCNC: 31.9 G/DL (ref 28.4–34.8)
MCV RBC AUTO: 93.6 FL (ref 82.6–102.9)
MONOCYTES # BLD: 8 % (ref 1–7)
MORPHOLOGY: ABNORMAL
NRBC AUTOMATED: 0 PER 100 WBC
PDW BLD-RTO: 15.7 % (ref 11.8–14.4)
PLATELET # BLD: ABNORMAL K/UL (ref 138–453)
PLATELET, FLUORESCENCE: 127 K/UL (ref 138–453)
PLATELET, IMMATURE FRACTION: 4.1 % (ref 1.1–10.3)
POTASSIUM SERPL-SCNC: 4.3 MMOL/L (ref 3.7–5.3)
RBC # BLD: 4.99 M/UL (ref 3.95–5.11)
SEG NEUTROPHILS: 79 % (ref 36–66)
SEGMENTED NEUTROPHILS ABSOLUTE COUNT: 3.08 K/UL (ref 1.8–7.7)
SODIUM BLD-SCNC: 135 MMOL/L (ref 135–144)
WBC # BLD: 3.9 K/UL (ref 3.5–11.3)

## 2022-08-01 PROCEDURE — 6360000002 HC RX W HCPCS: Performed by: NURSE PRACTITIONER

## 2022-08-01 PROCEDURE — 36415 COLL VENOUS BLD VENIPUNCTURE: CPT

## 2022-08-01 PROCEDURE — 2580000003 HC RX 258: Performed by: NURSE PRACTITIONER

## 2022-08-01 PROCEDURE — 97530 THERAPEUTIC ACTIVITIES: CPT

## 2022-08-01 PROCEDURE — C9113 INJ PANTOPRAZOLE SODIUM, VIA: HCPCS

## 2022-08-01 PROCEDURE — 2580000003 HC RX 258: Performed by: STUDENT IN AN ORGANIZED HEALTH CARE EDUCATION/TRAINING PROGRAM

## 2022-08-01 PROCEDURE — 6360000002 HC RX W HCPCS

## 2022-08-01 PROCEDURE — C9113 INJ PANTOPRAZOLE SODIUM, VIA: HCPCS | Performed by: NURSE PRACTITIONER

## 2022-08-01 PROCEDURE — 6370000000 HC RX 637 (ALT 250 FOR IP): Performed by: NURSE PRACTITIONER

## 2022-08-01 PROCEDURE — 6370000000 HC RX 637 (ALT 250 FOR IP)

## 2022-08-01 PROCEDURE — 2700000000 HC OXYGEN THERAPY PER DAY

## 2022-08-01 PROCEDURE — 85014 HEMATOCRIT: CPT

## 2022-08-01 PROCEDURE — 6360000002 HC RX W HCPCS: Performed by: STUDENT IN AN ORGANIZED HEALTH CARE EDUCATION/TRAINING PROGRAM

## 2022-08-01 PROCEDURE — 76770 US EXAM ABDO BACK WALL COMP: CPT

## 2022-08-01 PROCEDURE — 99223 1ST HOSP IP/OBS HIGH 75: CPT | Performed by: INTERNAL MEDICINE

## 2022-08-01 PROCEDURE — 2580000003 HC RX 258

## 2022-08-01 PROCEDURE — 93010 ELECTROCARDIOGRAM REPORT: CPT | Performed by: INTERNAL MEDICINE

## 2022-08-01 PROCEDURE — 97161 PT EVAL LOW COMPLEX 20 MIN: CPT

## 2022-08-01 PROCEDURE — 85018 HEMOGLOBIN: CPT

## 2022-08-01 PROCEDURE — 85055 RETICULATED PLATELET ASSAY: CPT

## 2022-08-01 PROCEDURE — 6370000000 HC RX 637 (ALT 250 FOR IP): Performed by: STUDENT IN AN ORGANIZED HEALTH CARE EDUCATION/TRAINING PROGRAM

## 2022-08-01 PROCEDURE — 2500000003 HC RX 250 WO HCPCS

## 2022-08-01 PROCEDURE — 1200000000 HC SEMI PRIVATE

## 2022-08-01 PROCEDURE — 85025 COMPLETE CBC W/AUTO DIFF WBC: CPT

## 2022-08-01 PROCEDURE — 99222 1ST HOSP IP/OBS MODERATE 55: CPT | Performed by: INTERNAL MEDICINE

## 2022-08-01 PROCEDURE — 80048 BASIC METABOLIC PNL TOTAL CA: CPT

## 2022-08-01 RX ORDER — QUETIAPINE FUMARATE 100 MG/1
100 TABLET, FILM COATED ORAL NIGHTLY
Status: DISCONTINUED | OUTPATIENT
Start: 2022-08-01 | End: 2022-08-05 | Stop reason: HOSPADM

## 2022-08-01 RX ORDER — DIPHENHYDRAMINE HYDROCHLORIDE 50 MG/ML
25 INJECTION INTRAMUSCULAR; INTRAVENOUS EVERY 6 HOURS PRN
Status: DISCONTINUED | OUTPATIENT
Start: 2022-08-01 | End: 2022-08-05 | Stop reason: HOSPADM

## 2022-08-01 RX ORDER — LORAZEPAM 2 MG/ML
0.5 INJECTION INTRAMUSCULAR EVERY 6 HOURS PRN
Status: DISCONTINUED | OUTPATIENT
Start: 2022-08-01 | End: 2022-08-01

## 2022-08-01 RX ORDER — TRAZODONE HYDROCHLORIDE 100 MG/1
100 TABLET ORAL NIGHTLY
Status: DISCONTINUED | OUTPATIENT
Start: 2022-08-01 | End: 2022-08-05 | Stop reason: HOSPADM

## 2022-08-01 RX ORDER — BISACODYL 10 MG
10 SUPPOSITORY, RECTAL RECTAL 2 TIMES DAILY
Status: DISCONTINUED | OUTPATIENT
Start: 2022-08-01 | End: 2022-08-05 | Stop reason: HOSPADM

## 2022-08-01 RX ORDER — CIPROFLOXACIN 2 MG/ML
400 INJECTION, SOLUTION INTRAVENOUS EVERY 12 HOURS
Status: DISCONTINUED | OUTPATIENT
Start: 2022-08-01 | End: 2022-08-01

## 2022-08-01 RX ORDER — MIDAZOLAM HYDROCHLORIDE 2 MG/2ML
0.5 INJECTION, SOLUTION INTRAMUSCULAR; INTRAVENOUS EVERY 6 HOURS PRN
Status: DISCONTINUED | OUTPATIENT
Start: 2022-08-01 | End: 2022-08-01

## 2022-08-01 RX ORDER — ALPRAZOLAM 0.25 MG/1
0.25 TABLET ORAL ONCE
Status: COMPLETED | OUTPATIENT
Start: 2022-08-01 | End: 2022-08-01

## 2022-08-01 RX ORDER — MORPHINE SULFATE 2 MG/ML
2 INJECTION, SOLUTION INTRAMUSCULAR; INTRAVENOUS EVERY 4 HOURS PRN
Status: DISCONTINUED | OUTPATIENT
Start: 2022-08-01 | End: 2022-08-03

## 2022-08-01 RX ORDER — CALCIUM CARBONATE 200(500)MG
500 TABLET,CHEWABLE ORAL 3 TIMES DAILY PRN
Status: DISCONTINUED | OUTPATIENT
Start: 2022-08-01 | End: 2022-08-05 | Stop reason: HOSPADM

## 2022-08-01 RX ADMIN — METOCLOPRAMIDE 5 MG: 5 TABLET ORAL at 09:52

## 2022-08-01 RX ADMIN — HYDROCODONE BITARTRATE AND ACETAMINOPHEN 1 TABLET: 5; 325 TABLET ORAL at 16:14

## 2022-08-01 RX ADMIN — METOCLOPRAMIDE 5 MG: 5 TABLET ORAL at 15:18

## 2022-08-01 RX ADMIN — SENNOSIDES 17.2 MG: 8.6 TABLET, COATED ORAL at 22:34

## 2022-08-01 RX ADMIN — BISACODYL 10 MG: 10 SUPPOSITORY RECTAL at 21:51

## 2022-08-01 RX ADMIN — HEPARIN SODIUM 5000 UNITS: 5000 INJECTION INTRAVENOUS; SUBCUTANEOUS at 22:35

## 2022-08-01 RX ADMIN — GUAIFENESIN 200 MG: 200 SOLUTION ORAL at 02:49

## 2022-08-01 RX ADMIN — MORPHINE SULFATE 2 MG: 2 INJECTION, SOLUTION INTRAMUSCULAR; INTRAVENOUS at 07:51

## 2022-08-01 RX ADMIN — GUAIFENESIN 200 MG: 200 SOLUTION ORAL at 16:16

## 2022-08-01 RX ADMIN — MEROPENEM 1000 MG: 1 INJECTION, POWDER, FOR SOLUTION INTRAVENOUS at 11:06

## 2022-08-01 RX ADMIN — ONDANSETRON 4 MG: 2 INJECTION INTRAMUSCULAR; INTRAVENOUS at 07:34

## 2022-08-01 RX ADMIN — QUETIAPINE FUMARATE 100 MG: 100 TABLET ORAL at 22:35

## 2022-08-01 RX ADMIN — SODIUM CHLORIDE, PRESERVATIVE FREE 10 ML: 5 INJECTION INTRAVENOUS at 09:54

## 2022-08-01 RX ADMIN — HEPARIN SODIUM 5000 UNITS: 5000 INJECTION INTRAVENOUS; SUBCUTANEOUS at 05:46

## 2022-08-01 RX ADMIN — CLONAZEPAM 0.5 MG: 0.5 TABLET ORAL at 17:57

## 2022-08-01 RX ADMIN — SODIUM CHLORIDE 80 MG: 9 INJECTION, SOLUTION INTRAVENOUS at 08:40

## 2022-08-01 RX ADMIN — SODIUM CHLORIDE, PRESERVATIVE FREE 40 MG: 5 INJECTION INTRAVENOUS at 19:24

## 2022-08-01 RX ADMIN — ALPRAZOLAM 0.25 MG: 0.25 TABLET ORAL at 11:01

## 2022-08-01 RX ADMIN — TRAZODONE HYDROCHLORIDE 100 MG: 100 TABLET ORAL at 22:35

## 2022-08-01 RX ADMIN — BISACODYL 10 MG: 10 SUPPOSITORY RECTAL at 11:57

## 2022-08-01 RX ADMIN — HYDROCODONE BITARTRATE AND ACETAMINOPHEN 1 TABLET: 5; 325 TABLET ORAL at 05:46

## 2022-08-01 RX ADMIN — DIPHENHYDRAMINE HYDROCHLORIDE 25 MG: 50 INJECTION, SOLUTION INTRAMUSCULAR; INTRAVENOUS at 22:34

## 2022-08-01 RX ADMIN — CEFEPIME 2000 MG: 2 INJECTION, POWDER, FOR SOLUTION INTRAVENOUS at 05:46

## 2022-08-01 RX ADMIN — SODIUM CHLORIDE: 9 INJECTION, SOLUTION INTRAVENOUS at 21:53

## 2022-08-01 RX ADMIN — METOCLOPRAMIDE 5 MG: 5 TABLET ORAL at 22:34

## 2022-08-01 RX ADMIN — HEPARIN SODIUM 5000 UNITS: 5000 INJECTION INTRAVENOUS; SUBCUTANEOUS at 15:18

## 2022-08-01 RX ADMIN — HYDROCODONE BITARTRATE AND ACETAMINOPHEN 1 TABLET: 5; 325 TABLET ORAL at 22:34

## 2022-08-01 RX ADMIN — ANTACID TABLETS 500 MG: 500 TABLET, CHEWABLE ORAL at 03:38

## 2022-08-01 RX ADMIN — MEROPENEM 1000 MG: 1 INJECTION, POWDER, FOR SOLUTION INTRAVENOUS at 19:24

## 2022-08-01 RX ADMIN — ONDANSETRON 4 MG: 2 INJECTION INTRAMUSCULAR; INTRAVENOUS at 01:42

## 2022-08-01 RX ADMIN — BUSPIRONE HYDROCHLORIDE 10 MG: 10 TABLET ORAL at 22:36

## 2022-08-01 RX ADMIN — POLYETHYLENE GLYCOL 3350 17 G: 17 POWDER, FOR SOLUTION ORAL at 09:06

## 2022-08-01 RX ADMIN — BUSPIRONE HYDROCHLORIDE 10 MG: 10 TABLET ORAL at 09:52

## 2022-08-01 RX ADMIN — ESCITALOPRAM OXALATE 10 MG: 10 TABLET ORAL at 09:52

## 2022-08-01 RX ADMIN — ONDANSETRON 4 MG: 4 TABLET, ORALLY DISINTEGRATING ORAL at 17:45

## 2022-08-01 ASSESSMENT — PAIN DESCRIPTION - ORIENTATION
ORIENTATION: OTHER (COMMENT)
ORIENTATION: UPPER

## 2022-08-01 ASSESSMENT — PAIN SCALES - GENERAL
PAINLEVEL_OUTOF10: 2
PAINLEVEL_OUTOF10: 7
PAINLEVEL_OUTOF10: 7
PAINLEVEL_OUTOF10: 8
PAINLEVEL_OUTOF10: 3
PAINLEVEL_OUTOF10: 8
PAINLEVEL_OUTOF10: 7

## 2022-08-01 ASSESSMENT — PAIN DESCRIPTION - ONSET: ONSET: ON-GOING

## 2022-08-01 ASSESSMENT — PAIN DESCRIPTION - LOCATION
LOCATION: HEAD
LOCATION: LEG;KNEE;ABDOMEN
LOCATION: LEG;ABDOMEN
LOCATION: GENERALIZED

## 2022-08-01 ASSESSMENT — PAIN DESCRIPTION - DESCRIPTORS
DESCRIPTORS: ACHING
DESCRIPTORS: ACHING;THROBBING
DESCRIPTORS: ACHING

## 2022-08-01 ASSESSMENT — PAIN DESCRIPTION - PAIN TYPE: TYPE: ACUTE PAIN

## 2022-08-01 ASSESSMENT — PAIN DESCRIPTION - FREQUENCY: FREQUENCY: INTERMITTENT

## 2022-08-01 ASSESSMENT — PAIN - FUNCTIONAL ASSESSMENT: PAIN_FUNCTIONAL_ASSESSMENT: ACTIVITIES ARE NOT PREVENTED

## 2022-08-01 NOTE — PROGRESS NOTES
(02/08/2021); Gastrostomy tube placement (N/A, 2/8/2021); ERCP (N/A, 2/8/2021); ERCP (2/8/2021); Upper gastrointestinal endoscopy (N/A, 11/24/2021); hernia repair; Colonoscopy (N/A, 1/28/2022); and IR NONTUNNELED VASCULAR CATHETER > 5 YEARS (5/10/2022). Assessment   Body Structures, Functions, Activity Limitations Requiring Skilled Therapeutic Intervention: Decreased functional mobility ; Decreased strength;Decreased posture;Decreased sensation;Decreased endurance; Increased pain;Decreased cognition  Assessment: Patient was able to transfer into a chair with one person assistance. Unwilling to ambulate. She has either limited gait or no gait at baseline; transfers only. Patient will need further PT to regain functional independence.   Therapy Prognosis: Good  Decision Making: Low Complexity  Clinical Presentation: stable  Requires PT Follow-Up: Yes  Activity Tolerance  Activity Tolerance: Patient limited by fatigue     Plan   Plan  Plan:  (5x/wk)  Current Treatment Recommendations: Strengthening, ROM, Patient/Caregiver education & training, Therapeutic activities, Functional mobility training, Transfer training, Safety education & training, Home exercise program  Safety Devices  Type of Devices: Call light within reach, Chair alarm in place, Left in chair, Gait belt, Nurse notified, All fall risk precautions in place     Restrictions  Restrictions/Precautions  Restrictions/Precautions: Up as Tolerated     Subjective   General  Chart Reviewed: Yes  Patient assessed for rehabilitation services?: Yes  Family / Caregiver Present: No  Follows Commands: Within Functional Limits         Social/Functional History  Social/Functional History  Lives With: Other (comment)  Type of Home: Facility  Home Layout: One level  Home Access: Level entry  Bathroom Accessibility: Accessible  Home Equipment: Valentino Sings, rolling, [address] Help From: Family  ADL Assistance: Needs assistance  Toileting: Needs assistance  Homemaking Assistance: Needs assistance  Homemaking Responsibilities: No  Ambulation Assistance: Needs assistance  Transfer Assistance: Needs assistance  Active : No  Mode of Transportation: Family (either son Brittnee Pruitt or her facility transports to appointments)  Additional Comments: Patient says she walks back and forth to her bathroom alone at facility. Later upon further questioning, she clarifies that she rolls into the bathroom with her wheelchair and does her own \"swivel\" transfer. Cognition   Orientation  Orientation Level: Oriented to person;Disoriented to time;Disoriented to situation;Oriented to place  Cognition  Overall Cognitive Status: Exceptions  Arousal/Alertness: Appropriate responses to stimuli  Following Commands: Follows one step commands with repetition; Follows one step commands with increased time  Attention Span: Attends with cues to redirect  Memory: Decreased short term memory;Decreased recall of recent events;Decreased recall of precautions  Safety Judgement: Decreased awareness of need for assistance;Decreased awareness of need for safety  Problem Solving: Decreased awareness of errors  Insights: Decreased awareness of deficits  Initiation: Requires cues for all  Sequencing: Requires cues for all     Objective   Heart Rate: 88  Heart Rate Source: Monitor  BP: 136/77  BP Location: Left upper arm  BP Method: Automatic  Patient Position: Semi fowlers  MAP (Calculated): 96.67  Resp: 16  SpO2: 96 %  O2 Device: Nasal cannula         Strength RLE  Strength RLE: Exception  R Hip Flexion: 3+/5  R Hip Extension: 3+/5  R Knee Extension: 4-/5  R Ankle Dorsiflexion: 3+/5  R Ankle Plantar flexion: 3+/5  Strength LLE  Strength LLE: Exception  L Hip Flexion: 3+/5  L Hip Extension: 3+/5  L Knee Extension: 4-/5  L Ankle Dorsiflexion: 3+/5  L Ankle Plantar Flexion: 3+/5           Bed mobility  Supine to Sit: Minimal assistance  Transfers  Sit to Stand: Minimal Assistance  Stand to sit: Minimal Assistance  Bed to Chair: Minimal assistance        Balance  Standing - Static: Fair;-  Standing - Dynamic: +;Poor  Comments: She is unable to stand without UE support. AM-PAC Score  AM-PAC Inpatient Mobility Raw Score : 13 (08/01/22 1345)  AM-PAC Inpatient T-Scale Score : 36.74 (08/01/22 1345)  Mobility Inpatient CMS 0-100% Score: 64.91 (08/01/22 1345)  Mobility Inpatient CMS G-Code Modifier : CL (08/01/22 1345)      Goals  Short Term Goals  Time Frame for Short term goals: 14 visits  Short term goal 1: Supine to/from sit with SBA. Short term goal 2: Sit to/from stand with SBA. Short term goal 3: Pivot chair to bed CGA.        Education  Patient Education  Education Given To: Patient  Education Provided: Role of Therapy;Plan of Care;Transfer Training;Precautions  Education Method: Demonstration;Verbal  Barriers to Learning: Cognition  Education Outcome: Continued education needed      Therapy Time   Individual Concurrent Group Co-treatment   Time In 0920         Time Out 0955         Minutes 35         Timed Code Treatment Minutes: Henry 0660, PT

## 2022-08-01 NOTE — CARE COORDINATION
Transitional planning note: call placed to patient's son madhu per patient request and verified plan is to return to [facility] at discharge

## 2022-08-01 NOTE — PROGRESS NOTES
[facility]  Internal Medicine Teaching Residency Program  Inpatient Daily Progress Note  ______________________________________________________________________________    Patient: Patrizia Vargas  YOB: 1945   DCS:[id number]    Acct: [de-identified]     Room: [facility]5259-  Admit date: 7/31/2022  Today's date: 08/01/22  Number of days in the hospital: 1    SUBJECTIVE   Admitting Diagnosis: UTI (urinary tract infection)  CC: Abdominal pain  Pt examined at bedside. Chart & results reviewed. - Overnight patient had multiple episodes of coffee ground hematemesis. - GI consulted. - IV Protonix given. - Patient is hemodynamically stable. ROS:  Constitutional:  negative for chills, fevers, sweats  Respiratory:  negative for cough, dyspnea on exertion, hemoptysis, shortness of breath, wheezing  Cardiovascular:  negative for chest pain, chest pressure/discomfort, lower extremity edema, palpitations  Gastrointestinal:  negative for abdominal pain, constipation, diarrhea, nausea, vomiting  Neurological:  negative for dizziness, headache  BRIEF HISTORY     The patient is a pleasant 68 y.o. female presents with a chief complaint of recurrent UTIs, single kidney and sepsis secondary to her UTIs. Patient was febrile at her facility, acting mildly off for her son came to visit, that she was struggling with word finding and mild perseveration. Both patient and the son endorse that this is exactly how she gets when she gets UTIs. 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 And states that she has multiple times of UTIs and sepsis as an adult. Denies any injury, trauma, falls, patient states that she is normally lucid just having some difficulty with her word finding.   Patient denies any chest pain but states that there is diffuse abdominal pain specifically in the lower quadrants that is an 8 out of 10 is causing her to be nauseous. In addition, states that she has not had a bowel movement in 3 days and felt extremely bloated. OBJECTIVE     Vital Signs:  BP (!) 141/80   Pulse 91   Temp 98.5 °F (36.9 °C) (Oral)   Resp 18   Ht 5' 2\" (1.575 m)   Wt 179 lb (81.2 kg)   SpO2 97%   BMI 32.74 kg/m²     Temp (24hrs), Av.8 °F (37.7 °C), Min:98.1 °F (36.7 °C), Max:101.3 °F (38.5 °C)    In: -   Out: 53801 [Urine:09055]    Body Mass Index : Body mass index is 32.74 kg/m². PHYSICAL EXAMINATION:  Constitutional: This is a well developed, well nourished, 30-34.9 - Obesity Grade I 68y.o. year old female who is alert, oriented, cooperative and in distress. Head:normocephalic and atraumatic. EENT:  PERRLA. No conjunctival injections. Septum was midline, mucosa was without erythema, exudates or cobblestoning. No thrush was noted. Neck: Supple without thyromegaly. No elevated JVP. Trachea was midline. Respiratory: Chest was symmetrical without dullness to percussion. Breath sounds bilaterally were clear to auscultation. There were no wheezes, rhonchi or rales. There is no intercostal retraction or use of accessory muscles. No egophony noted. Cardiovascular: Regular without murmur, clicks, gallops or rubs. Abdomen: Distended, suprapubic tenderness. . No rebound, rigidity or guarding was appreciated. Lymphatic: No lymphadenopathy. Musculoskeletal: Normal curvature of the spine. No gross muscle weakness. Extremities:  No lower extremity edema, ulcerations, tenderness, varicosities or erythema. Muscle size, tone and strength are normal.  No involuntary movements are noted. Skin:  Warm and dry. Good color, turgor and pigmentation. No lesions or scars. No cyanosis or clubbing  Neurological/Psychiatric: The patient's general behavior, level of consciousness, thought content and tearful.         Medications:  Scheduled Medications:    cefepime  1,000 mg IntraVENous Once budesonide-formoterol  2 puff Inhalation BID    busPIRone  10 mg Oral BID    escitalopram  10 mg Oral Daily    metoclopramide  5 mg Oral 4x Daily    atorvastatin  20 mg Oral Daily    sodium chloride flush  5-40 mL IntraVENous 2 times per day    heparin (porcine)  5,000 Units SubCUTAneous 3 times per day    cefepime  2,000 mg IntraVENous Q12H    metoprolol succinate  25 mg Oral Daily    senna  2 tablet Oral Nightly     Continuous Infusions:    sodium chloride      sodium chloride 75 mL/hr at 07/31/22 2032     PRN Medicationsalbuterol, 2.5 mg, Q6H PRN  clonazePAM, 0.5 mg, BID PRN  sodium chloride flush, 5-40 mL, PRN  sodium chloride, , PRN  ondansetron, 4 mg, Q8H PRN   Or  ondansetron, 4 mg, Q6H PRN  polyethylene glycol, 17 g, Daily PRN  acetaminophen, 650 mg, Q6H PRN   Or  acetaminophen, 650 mg, Q6H PRN  hydrALAZINE, 10 mg, Q6H PRN  guaiFENesin, 200 mg, Q4H PRN  HYDROcodone 5 mg - acetaminophen, 1 tablet, Q6H PRN        Diagnostic Labs:  CBC:   Recent Labs     07/31/22  1444   WBC 6.0   RBC 5.06   HGB 15.1   HCT 44.3   MCV 87.5   RDW 15.3*        BMP:   Recent Labs     07/31/22  1444   *   K 4.8   CL 95*   CO2 23   BUN 17   CREATININE 1.03*     BNP: No results for input(s): BNP in the last 72 hours. PT/INR: No results for input(s): PROTIME, INR in the last 72 hours. APTT: No results for input(s): APTT in the last 72 hours. CARDIAC ENZYMES: No results for input(s): CKMB, CKMBINDEX, TROPONINI in the last 72 hours. Invalid input(s): CKTOTAL;3  FASTING LIPID PANEL:  Lab Results   Component Value Date    CHOL 203 (H) 12/14/2020    HDL 43 12/14/2020    TRIG 268 (H) 12/14/2020     LIVER PROFILE:   Recent Labs     07/31/22  1444   AST 50*   ALT 37*   BILITOT 0.27*   ALKPHOS 131*      MICROBIOLOGY:   Lab Results   Component Value Date/Time    CULTURE NO GROWTH <24 HRS 07/31/2022 02:40 PM       Imaging:    CT ABDOMEN PELVIS WO CONTRAST Additional Contrast? None    Result Date: 7/31/2022  1.  No acute findings in the abdomen or pelvis. 2. Trace bilateral pleural effusions and trace pericardial effusion. 3. Incidental findings as above for which no dedicated follow-up is recommended. XR ABDOMEN (KUB) (SINGLE AP VIEW)    Result Date: 7/31/2022  Gaseous dilatation of the stomach. Patient may benefit from an NG tube. Moderate constipation of the colon without a large focal bolus in the rectum. The small bowel and colon are not dilated. CT HEAD WO CONTRAST    Result Date: 7/31/2022  No acute intracranial abnormality. XR CHEST PORTABLE    Result Date: 7/31/2022  No acute process. ASSESSMENT & PLAN     ASSESSMENT / PLAN:      IMPRESSION  This is a 68 y.o. female who presented with Abdominal pain and found to have UTI. Principal Problem:  UTI (urinary tract infection)  - Urine analysis positive for bacterial infection.  - Similar presentation to the previous UTI episodes. - Lactic acid elevated. - Urine culture. Pending.  - Blood culture. Pending.  - US renal ordered. - Started on cefepime 2 grams as pt was sensitive in the previous episodes. Discontinued. Meropenem started. - Started on fluid NS 75 ml/hr     Active Problems:  Depression  - Controlled. - Will continue Lexapro 10 mg daily. Anxiety  - Controlled. - Will continue Buspirone 10 mg twice daily. BENEDICT (acute kidney injury) ([facility] [address]  - Creatinine is 1.03 baseline is normal.  - Most likely prerenal in the setting of sepsis. - Will monitor I/Os. - Will avoid nephrotoxic drugs. Essential hypertension  - Controlled. - Will continue Toprol 25 mg.  - Will continue hydralazine 10 PRN. Tobacco abuse  - Quit smoking. COPD  - Controlled. - Will continue Albuterol  - Will continue Symbicort     Bowl obstruction  - Patient is constipated for 3 days.  - Nauseated. - Moderately unable to take solid food. - History of appendectomy, and cholycystectomy. - X ray KUB ordered. Gastric dilation. No obstruction.      DVT ppx: Heparin PT/OT: Consulted. Will follow up  Discharge Planning:  consulted. Will follow up    Gilbert Miranda MD  Internal Medicine Resident, PGY-1  [facility];  [place], [place]  8/1/2022, 1:45 AM

## 2022-08-01 NOTE — PROGRESS NOTES
Physician Progress Note      Favian Bautista  [facility] #:                  [id number]  :                       1945  ADMIT DATE:       2022 2:20 PM  DISCH DATE:  RESPONDING  PROVIDER #:        STEPHANY Roberts TEXT:    Patient admitted with sepsis. Noted documentation of Acute Kidney Injury in   H&P on . In order to support the diagnosis of BENEDICT, please include   additional clinical indicators in your documentation. ? Or please document if   the diagnosis of BENEDICT has been ruled out after further study. The medical record reflects the following:  Risk Factors: sepsis  Clinical Indicators: per H&P \"BENEDICT  Most likely prerenal in the setting of   sepsis, Creatinine is 1.03 baseline is normal\", per Epic last creatinine was   1.13 on 22  Treatment: IV fluids, labs, monitoring    Defined by Kidney Disease Improving Global Outcomes (KDIGO) clinical practice   guideline for acute kidney injury:  -Increase in SCr by greater than or equal to 0.3 mg/dl within 48 hours; or  -Increase or decrease in SCr to greater than or equal to 1.5 times baseline,   which is known or presumed to have occurred within the prior 7 days; or  -Urine volume < 0.5ml/kg/h for 6 hours. Thank you, Keeley Sommers, LEXY  email - [email or web address]. Health As We Age  cell- [phone]  office hours -7A-3P  Options provided:  -- Acute kidney injury evidenced by, Please document evidence as well as a   numerical baseline creatinine, if known.   -- Acute kidney injury ruled out after study  -- Other - I will add my own diagnosis  -- Disagree - Not applicable / Not valid  -- Disagree - Clinically unable to determine / Unknown  -- Refer to Clinical Documentation Reviewer    PROVIDER RESPONSE TEXT:    This patient has acute kidney injury as evidenced by Most likely prerenal BENEDICT,   Creatinine was 1.03 on admission, baseline is normal for the patient   (according to age and body mass)    Query created by: Park Kyle on 2022

## 2022-08-01 NOTE — PLAN OF CARE
Problem: Pain  Goal: Verbalizes/displays adequate comfort level or baseline comfort level  8/1/2022 1632 by Nisha He RN  Outcome: Progressing  8/1/2022 0425 by Anais Claros RN  Outcome: Progressing     Problem: Safety - Adult  Goal: Free from fall injury  8/1/2022 1632 by Nisha He RN  Outcome: Progressing  8/1/2022 0425 by Anais Claros RN  Outcome: Progressing     Problem: ABCDS Injury Assessment  Goal: Absence of physical injury  8/1/2022 1632 by Nisha He RN  Outcome: Progressing  8/1/2022 0425 by Anais Claros RN  Outcome: Progressing

## 2022-08-01 NOTE — CARE COORDINATION
08/01/22 1054   Service Assessment   Patient Orientation Alert and Oriented;Person;Place;Situation   Cognition Alert   History Provided By Patient   Primary Caregiver Other (Comment)  (lives at a long term care facility ([place]))   Support Systems Children  (son Karine Turpin)   Prior Functional Level Assistance with the following:   Current Functional Level Assistance with the following:   Can patient return to prior living arrangement Yes   Ability to make needs known: Good   Family able to assist with home care needs: Yes   [address] Other (Comment)  (long term resident at [facility])   Social/Functional History   Lives With Other (comment)  (lives at [facility])   Type of [address]  ([facility])   Home Layout One level   Home Access Level entry   [address] [facility] Help From Family   ADL Assistance Needs assistance   Toileting Needs assistance   Homemaking Assistance Needs assistance   Homemaking Responsibilities No   Ambulation Assistance Needs assistance   Transfer Assistance Needs assistance   Active  No   Mode of Transportation Family  (either margarette leung or her facility transports to appointments)   Discharge Planning   Type of [address] Other (Comment)  (lives at facility)   Current Services Prior To Admission Durable Medical Equipment   Potential Assistance Needed N/A   DME Fernandes Dessert; Wheelchair   DME Ordered? No   Potential Assistance Purchasing Medications No   Type of Home Care Services None   Patient expects to be discharged to: Long-term care   Services At/After Discharge   Transition of Care Consult (CM Consult) [address] Provided?  No   Mode of Transport at Discharge [facility] Participation: Discharge Planning   The Plan for Transition of Care is related to the following treatment goals: comfort   The Patient and/or Patient Representative was provided with a Choice of Provider? Patient   The Patient and/Or Patient Representative agree with the Discharge Plan? Yes   Freedom of Choice list was provided with basic dialogue that supports the patient's individualized plan of care/goals, treatment preferences, and shares the quality data associated with the providers? Yes     Met with patient to discuss transitional planning. Patient is a long term resident at [facility]. Patient would like to return there at discharge. Referral sent to [facility] and left message with admissions office to verify that patient is ok to return at discharge. Furthermore, was informed that patient has a [address] order. Requested copy of DNR-CC order from [facility]. [place] of choice provided and patient is agreeable to plan.

## 2022-08-01 NOTE — H&P
[address]     Department of Internal Medicine - Staff Internal Medicine Teaching Service          ADMISSION NOTE/HISTORY AND PHYSICAL EXAMINATION   Date: 7/31/2022  Patient Name: Tye Hoang  Date of admission: 7/31/2022  2:20 PM  YOB: 1945  PCP: No primary care provider on file. History Obtained From:  patient    CHIEF COMPLAINT     Chief complaint: Abdominal pain    HISTORY OF PRESENTING ILLNESS     The patient is a pleasant 68 y.o. female presents with a chief complaint of recurrent UTIs, single kidney and sepsis secondary to her UTIs. Patient was febrile at her facility, acting mildly off for her son came to visit, that she was struggling with word finding and mild perseveration. Both patient and the son endorse that this is exactly how she gets when she gets UTIs. 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 And states that she has multiple times of UTIs and sepsis as an adult. Denies any injury, trauma, falls, patient states that she is normally lucid just having some difficulty with her word finding. Patient denies any chest pain but states that there is diffuse abdominal pain specifically in the lower quadrants that is an 8 out of 10 is causing her to be nauseous. In addition, states that she has not had a bowel movement in 3 days and felt extremely bloated.     Review of Systems:  General ROS: Completed and except as mentioned above were negative   HEENT ROS: Completed and except as mentioned above were negative   Allergy and Immunology ROS:  Completed and except as mentioned above were negative  Hematological and Lymphatic ROS:  Completed and except as mentioned above were negative  Respiratory ROS:  Completed and except as mentioned above were negative  Cardiovascular ROS:  Completed and except as mentioned above were negative  Gastrointestinal ROS: Completed and except 2/8/2021    ERCP STENT REMOVAL performed by Analilia Watson MD at [facility]    ERCP  2/8/2021    ERCP STONE REMOVAL performed by Analilia Watson MD at [address] [facility]    GASTRIC FUNDOPLICATION N/A 90/44/8618    XI LAPAROSCOPIC ROBOTIC [address], CHERYL FUNDOPLICATION performed by Rukhsana Torres MD at [address] [facility] [address] [facility] [address] 03/27/2020    EGD PEG TUBE PLACEMENT performed by Rukhsana Torres MD at [address] N/A 2/8/2021    **CASE IN O.R. W/ GI STAFF - EGD WITH REMOVAL PEG TUBE performed by Analilia Watson MD at [facility]    [facility] SURGERY      [facility] CATH POWER PICC TRIPLE  03/04/2020         HERNIA REPAIR      Hiatal hernia    HYSTERECTOMY (CERVIX STATUS UNKNOWN)      Abdominal    IR NONTUNNELED VASCULAR CATHETER  5/10/2022    IR NONTUNNELED VASCULAR CATHETER 5/10/2022 Breonna Hurtado MD [facility]    JOINT REPLACEMENT Right     right hip    OVARY REMOVAL      RHINOPLASTY      TONSILLECTOMY      TOTAL HIP ARTHROPLASTY      TOTAL NEPHRECTOMY      atrophic from infections, age 13    TRACHEOSTOMY N/A 03/27/2020    **ADD ON **WANTS 10:00AM **TRACHEOTOMY performed by Rukhsana Torres MD at [address] [facility] N/A 04/02/2020    TRACHEOTOMY EXCHANGE performed by Rukhsana Torres MD at [address] [facility] 03/17/2020    BEDSIDE EGD ESOPHAGOGASTRODUODENOSCOPY (ICU) performed by Rukhsana Torres MD at [address] [facility] 05/18/2020    EGD BIOPSY performed by Army Angeles MD at [address] [facility]  05/18/2020    EGD DILATION BALLOON performed by Army Angeles MD at [facility] N/A 07/06/2020    ** CASE IN O.R. WITH GI STAFF** EGD ESOPHAGOGASTRODUODENOSCOPY performed by Shanelle Hernandez MD at [address] [facility] 11/09/2020    EGD DIAGNOSTIC ONLY performed by Cara Dubon MD at [address] [place] [address]  02/08/2021    - EGD WITH REMOVAL PEG TUBE,ERCP STENT REMOVAL,ERCP STONE REMOVAL    UPPER GASTROINTESTINAL ENDOSCOPY N/A 11/24/2021    EGD BIOPSY performed by Cara Dubon MD at [facility]       ALLERGIES     Prednisone, Compazine [prochlorperazine maleate], Penicillin v potassium, and Penicillins    MEDICATIONS PRIOR TO ADMISSION     Prior to Admission medications    Medication Sig Start Date End Date Taking? Authorizing Provider   carboxymethylcellulose 1 % ophthalmic solution Place 1 drop into both eyes in the morning and 1 drop at noon and 1 drop in the evening and 1 drop before bedtime. Yes Historical Provider, MD   albuterol (PROVENTIL) (2.5 MG/3ML) 0.083% nebulizer solution Take 2.5 mg by nebulization every 6 hours as needed for Wheezing   Yes Historical Provider, MD   Dyphylline-guaiFENesin 100-100 MG/5ML LIQD Take 5 mLs by mouth every 4 hours as needed   Yes Historical Provider, MD   GUAIFENESIN PO Take 800 mg by mouth as needed   Yes Historical Provider, MD   magnesium hydroxide (MILK OF MAGNESIA) 400 MG/5ML suspension Take 30 mLs by mouth Give 30ml by mouth as needed for constipation no BM x3 days   Yes Historical Provider, MD   acetaminophen (TYLENOL) 500 MG tablet Take 1,000 mg by mouth in the morning and at bedtime   Yes Historical Provider, MD   ondansetron (ZOFRAN) 4 MG tablet Take 4 mg by mouth in the morning and 4 mg at noon and 4 mg in the evening and 4 mg before bedtime. Yes Historical Provider, MD   clotrimazole-betamethasone (LOTRISONE) 1-0.05 % cream Apply topically 2 times daily Apply topically 2 times daily. 5/10/22   Haven Cousin, DO   Cholecalciferol (VITAMIN D) 50 MCG (2000 UT) CAPS capsule Take 1 capsule by mouth daily 5/10/22   Joy Harrold Sofy, DO   guaiFENesin (MUCINEX) 600 MG extended release tablet Take 400 mg by mouth in the morning. Indications: Cough.  As needed GM/118ML Place 1 enema rectally As needed after no BM for 5 days    Historical Provider, MD   budesonide-formoterol (SYMBICORT) 160-4.5 MCG/ACT AERO Inhale 2 puffs into the lungs 2 times daily    Historical Provider, MD   ferrous sulfate (FE TABS 325) 325 (65 Fe) MG EC tablet Take 1 tablet by mouth daily (with breakfast) 20   MARGOTH Black NP   amitriptyline (ELAVIL) 25 MG tablet Take 1 tablet by mouth nightly  Patient taking differently: Take 25 mg by mouth in the morning, at noon, and at bedtime 20   Anmol Bone MD   metoprolol succinate (TOPROL XL) 25 MG extended release tablet Take 1 tablet by mouth daily 20   Halle Najera MD   sucralfate (CARAFATE) 1 GM tablet Take 1 tablet by mouth 4 times daily 20   Jose Miguel Butterfield MD   Oyster Shell 500 MG TABS Take 500 mg by mouth 2 times daily     Historical Provider, MD   calcium carbonate (TUMS) 500 MG chewable tablet Take 1 tablet by mouth 3 times daily as needed for Heartburn    Historical Provider, MD   simvastatin (ZOCOR) 40 MG tablet Take 40 mg by mouth nightly. Historical Provider, MD       SOCIAL HISTORY     Tobacco: Former smoker.  1 pack a day for 50 years  Alcohol: No  Illicits: No  Occupation: N/A    FAMILY HISTORY     Family History   Problem Relation Age of Onset    Cancer Mother         uterine    Heart Attack Mother     Heart Attack Father     Other Maternal Grandfather         foot gangrene    Other Paternal Grandmother         bleeding ulcers    Other Paternal Grandfather         lung cancer       PHYSICAL EXAM     Vitals: BP (!) 178/94   Pulse 90   Temp 98.5 °F (36.9 °C) (Oral)   Resp 16   Ht 5' 2\" (1.575 m)   Wt 179 lb (81.2 kg)   SpO2 97%   BMI 32.74 kg/m²   Tmax: Temp (24hrs), Av.8 °F (37.7 °C), Min:98.1 °F (36.7 °C), Max:101.3 °F (38.5 °C)    Last Body weight:   Wt Readings from Last 3 Encounters:   22 179 lb (81.2 kg)   22 186 lb 8 oz (84.6 kg)   22 179 lb (81.2 kg)     Body Mass Index : Body mass index is 32.74 kg/m². PHYSICAL EXAMINATION:  Constitutional: This is a well developed, well nourished, 30-34.9 - Obesity Grade I 68y.o. year old female who is alert, oriented, cooperative and in distress. Head:normocephalic and atraumatic. EENT:  PERRLA. No conjunctival injections. Septum was midline, mucosa was without erythema, exudates or cobblestoning. No thrush was noted. Neck: Supple without thyromegaly. No elevated JVP. Trachea was midline. Respiratory: Chest was symmetrical without dullness to percussion. Breath sounds bilaterally were clear to auscultation. There were no wheezes, rhonchi or rales. There is no intercostal retraction or use of accessory muscles. No egophony noted. Cardiovascular: Regular without murmur, clicks, gallops or rubs. Abdomen: Distended, suprapubic tenderness. . No rebound, rigidity or guarding was appreciated. Lymphatic: No lymphadenopathy. Musculoskeletal: Normal curvature of the spine. No gross muscle weakness. Extremities:  No lower extremity edema, ulcerations, tenderness, varicosities or erythema. Muscle size, tone and strength are normal.  No involuntary movements are noted. Skin:  Warm and dry. Good color, turgor and pigmentation. No lesions or scars. No cyanosis or clubbing  Neurological/Psychiatric: The patient's general behavior, level of consciousness, thought content and tearful. INVESTIGATIONS     Laboratory Testing:     Recent Results (from the past 24 hour(s))   Culture, Blood 1    Collection Time: 07/31/22  2:37 PM    Specimen: Blood   Result Value Ref Range    Specimen Description . BLOOD     Special Requests UNKNOWN     Culture NO GROWTH <24 HRS    Culture, Blood 1    Collection Time: 07/31/22  2:40 PM    Specimen: Blood   Result Value Ref Range    Specimen Description . BLOOD     Special Requests R AC      Culture NO GROWTH <24 HRS    CBC with Auto Differential    Collection Time: 07/31/22  2:44 PM Result Value Ref Range    WBC 6.0 3.5 - 11.3 k/uL    RBC 5.06 3.95 - 5.11 m/uL    Hemoglobin 15.1 11.9 - 15.1 g/dL    Hematocrit 44.3 36.3 - 47.1 %    MCV 87.5 82.6 - 102.9 fL    MCH 29.8 25.2 - 33.5 pg    MCHC 34.1 28.4 - 34.8 g/dL    RDW 15.3 (H) 11.8 - 14.4 %    Platelets 318 423 - 835 k/uL    MPV 12.0 8.1 - 13.5 fL    NRBC Automated 0.0 0.0 per 100 WBC    Immature Granulocytes 1 (H) 0 %    Seg Neutrophils 78 (H) 36 - 66 %    Lymphocytes 11 (L) 24 - 44 %    Monocytes 8 (H) 1 - 7 %    Eosinophils % 1 1 - 4 %    Basophils 1 0 - 2 %    Absolute Immature Granulocyte 0.06 0.00 - 0.30 k/uL    Segs Absolute 4.68 1.8 - 7.7 k/uL    Absolute Lymph # 0.66 (L) 1.0 - 4.8 k/uL    Absolute Mono # 0.48 0.1 - 0.8 k/uL    Absolute Eos # 0.06 0.0 - 0.4 k/uL    Basophils Absolute 0.06 0.0 - 0.2 k/uL    Morphology ANISOCYTOSIS PRESENT     Morphology LARGE PLATELETS PRESENT    Comprehensive Metabolic Panel    Collection Time: 07/31/22  2:44 PM   Result Value Ref Range    Glucose 112 (H) 70 - 99 mg/dL    BUN 17 8 - 23 mg/dL    Creatinine 1.03 (H) 0.50 - 0.90 mg/dL    Calcium 9.6 8.6 - 10.4 mg/dL    Sodium 132 (L) 135 - 144 mmol/L    Potassium 4.8 3.7 - 5.3 mmol/L    Chloride 95 (L) 98 - 107 mmol/L    CO2 23 20 - 31 mmol/L    Anion Gap 14 9 - 17 mmol/L    Alkaline Phosphatase 131 (H) 35 - 104 U/L    ALT 37 (H) 5 - 33 U/L    AST 50 (H) <32 U/L    Total Bilirubin 0.27 (L) 0.3 - 1.2 mg/dL    Total Protein 7.3 6.4 - 8.3 g/dL    Albumin 4.3 3.5 - 5.2 g/dL    Albumin/Globulin Ratio 1.4 1.0 - 2.5    GFR Non-African American 52 (L) >60 mL/min    GFR African American >60 >60 mL/min    GFR Comment         Lactate, Sepsis    Collection Time: 07/31/22  2:44 PM   Result Value Ref Range    Lactic Acid, Sepsis, Whole Blood 2.1 (H) 0.5 - 1.9 mmol/L   Magnesium    Collection Time: 07/31/22  2:44 PM   Result Value Ref Range    Magnesium 1.7 1.6 - 2.6 mg/dL   Procalcitonin    Collection Time: 07/31/22  2:44 PM   Result Value Ref Range    Procalcitonin 0.30 (H) <0.09 ng/mL   Troponin    Collection Time: 07/31/22  2:44 PM   Result Value Ref Range    Troponin, High Sensitivity 16 (H) 0 - 14 ng/L   EKG 12 Lead    Collection Time: 07/31/22  3:31 PM   Result Value Ref Range    Ventricular Rate 88 BPM    Atrial Rate 88 BPM    P-R Interval 170 ms    QRS Duration 84 ms    Q-T Interval 384 ms    QTc Calculation (Bazett) 464 ms    P Axis 26 degrees    R Axis 26 degrees    T Axis 14 degrees   Urinalysis with Microscopic    Collection Time: 07/31/22  3:53 PM   Result Value Ref Range    Color, UA Yellow Yellow    Turbidity UA Clear Clear    Glucose, Ur NEGATIVE NEGATIVE    Bilirubin Urine NEGATIVE NEGATIVE    Ketones, Urine NEGATIVE NEGATIVE    Specific Gravity, UA 1.015 1.005 - 1.030    Urine Hgb SMALL (A) NEGATIVE    pH, UA 8.5 (H) 5.0 - 8.0    Protein, UA NEGATIVE  Verified by sulfosalicylic acid test. (A) NEGATIVE    Urobilinogen, Urine Normal Normal    Nitrite, Urine NEGATIVE NEGATIVE    Leukocyte Esterase, Urine TRACE (A) NEGATIVE    -          WBC, UA 5 TO 10 0 - 5 /HPF    RBC, UA 5 TO 10 0 - 4 /HPF    Epithelial Cells UA 0 TO 2 0 - 5 /HPF    Bacteria, UA MANY (A) None   Lactate, Sepsis    Collection Time: 07/31/22  5:20 PM   Result Value Ref Range    Lactic Acid, Sepsis, Whole Blood 1.2 0.5 - 1.9 mmol/L       Imaging:   CT ABDOMEN PELVIS WO CONTRAST Additional Contrast? None    Result Date: 7/31/2022  1. No acute findings in the abdomen or pelvis. 2. Trace bilateral pleural effusions and trace pericardial effusion. 3. Incidental findings as above for which no dedicated follow-up is recommended. CT HEAD WO CONTRAST    Result Date: 7/31/2022  No acute intracranial abnormality. XR CHEST PORTABLE    Result Date: 7/31/2022  No acute process. ASSESSMENT & PLAN     ASSESSMENT / PLAN:     IMPRESSION  This is a 68 y.o. female who presented with Abdominal pain and found to have UTI.      Principal Problem:  UTI (urinary tract infection)  - Urine analysis positive for bacterial infection.  - Similar presentation to the previous UTI episodes. - Lactic acid elevated. - Urine culture. Pending.  - Blood culture. Pending.  - US renal ordered. - Started on cefepime 2 grams as pt was sensitive in the previous episodes. Pending culture. - Started on fluid NS 75 ml/hr    Active Problems:  Depression  - Controlled. - Will continue Lexapro 10 mg daily. Anxiety  - Controlled. - Will continue Buspirone 10 mg twice daily. BENEDICT (acute kidney injury) ([facility] [address]  - Creatinine is 1.03 baseline is normal.  - Most likely prerenal in the setting of sepsis. - Will monitor I/Os. - Will avoid nephrotoxic drugs. Essential hypertension  - Controlled. - Will continue Toprol 25 mg.  - Will continue hydralazine 10 PRN. Tobacco abuse  - Quit smoking. COPD  - Controlled. - Will continue Albuterol  - Will continue Symbicort    Bowl obstruction  - Patient is constipated for 3 days.  - Nauseated. - Moderately unable to take solid food. - History of appendectomy, and cholycystectomy. - X ray KUB ordered. DVT ppx: Heparin     PT/OT: Consulted. Will follow up  Discharge Planning:  consulted. Will follow up    Jeffrey Brown MD  Internal Medicine Resident, PGY-1  [facility];  [place], [place]  7/31/2022, 8:18 PM

## 2022-08-01 NOTE — CONSULTS
THE [facility] AT [facility]  Consultation Note     . REASON FOR CONSULTATION:    Active hematemesis     HISTORY OF PRESENT ILLNESS:     This is a 68 y.o. female with PMH as noted below including ulcerative esophagitis , gastritis , GERD, large hiatal hernia status post robotic repair with fundoplication 2 /9921 , respiratory failure status post trach/PEG 2020, achalasia, cholecystectomy and ERCP who presented with complaints of fever, AMS and concerns for UTI. Upon arrival to the floor, pt had episode of coffee ground emesis of aprox 100 ml. Pt states she has not had BM in 4 days. She is also complaining of lower abdominal pain and cramping. No rectal bleeding. Pt denies any difficulty eating, dysphagia, weight loss, change in appetitive. No Nsaid use, no smoking, drinking or drug use    Summary of imaging completed at this time:  7/31/2022 CT Abdomen and pelvis showed was unremarkable except for trace pleural effusion and trace pericardial effusion      Summary of abnormal labs completed at this time:    Metabolic:CR 5.07-65,   Hematology: WNL  Coags:  Liver profile: Alk phos 131, ALT 37, AST 50, T bili 0.27    Previous GI history:   11/2021 EGD  per   Findings: The esophagus was tortuous with dilated distal third. The GE junction was noted at 38 cm. The GE junction compliant to the passage of scope without evidence of stricture or stenosis. Mild reflux esophagitis was noted at the GE junction. Sarah-Abrams tear. The mucosa in the fundus, cardia and proximal gastric body appeared severely congested, inflamed with diffuse erythema, and granularity. Biopsies were performed for histology. The distal gastric body antrum and pylorus appeared normal.  The examined duodenum appeared normal.     Recommendations:  Await pathology results. Continue IV pantoprazole 40 mg twice daily for 2 days then change to oral once daily thereafter. Start full liquid diet and advance as tolerated.   Patient's nausea vomiting most likely due to infectious (viral) vs medication gastritis. This will resolve with supportive care and antiemetics. Gastric distention noted on CT is most likely due to intractable nausea vomiting. Due to recurrent nausea vomiting. Continue supportive care. Once patient is able to tolerate diet she can be discharged home.   Patient to follow-up with primary GI: Dr Padmaja Shine  Past Medical/Social/Family History:  Past Medical History:   Diagnosis Date    Anxiety     Arthritis     Back pain     Bronchitis     Caffeine use     8 coffee / day    Carpal tunnel syndrome     Cataracts, bilateral     Constipation     COPD (chronic obstructive pulmonary disease) (HCC)     Emphysema    Depression     Diarrhea     GERD (gastroesophageal reflux disease)     H/O gastric ulcer     Hyperlipidemia     Hypertension     Mumps     MVP (mitral valve prolapse)     Dr. Neves Novel in May 2019    Recurrent UTI     Dr. Paulo Cabrera    Sinusitis     Tracheostomy in place [facility])     Ulcerative esophagitis     Wellness examination     Dr. Filomena Williamson seen in Jan 2020     Past Surgical History:   Procedure Laterality Date    APPENDECTOMY      [facility] [address] [facility], LAPAROSCOPIC N/A 05/22/2020    XI LAPAROSCOPIC ROBOTIC CHOLECYSTECTOMY, PYLOROPLASTY performed by Mary Michel MD at [address] [place] [address]      COLONOSCOPY N/A 1/28/2022    COLONOSCOPY POLYPECTOMY HOT performed by Tammy Gonzalez MD at [address] [place] [address]      ERCP  05/21/2020    with stent insertion, balloon dilation, sphinctereotomy    ERCP  05/21/2020    ** CASE IN OR WITY GI STAFF** ERCP STENT INSERTION performed by Tammy Gonzalez MD at [facility]    ERCP  05/21/2020    ** CASE IN OR WITH GI STAFF**ERCP SPHINCTER/PAPILLOTOMY performed by Tammy Gonzalez MD at [facility]    ERCP  05/21/2020    ** CASE IN OR WITH GI STAFF**ERCP DILATION BALLOON performed by Tammy Gonzalez MD at [facility]    ERCP N/A 2/8/2021    ERCP STENT REMOVAL performed by Joey Wiley MD at [facility]    ERCP  2/8/2021    ERCP STONE REMOVAL performed by Joey Wiley MD at [address] [facility]    GASTRIC FUNDOPLICATION N/A 11/00/1221    XI LAPAROSCOPIC ROBOTIC HIATAL HERNIA REPAIR, CHERYL FUNDOPLICATION performed by Mary Cevallos MD at [address] [facility] [address] 03/27/2020    EGD PEG TUBE PLACEMENT performed by Mary Cevallos MD at [address] [facility] N/A 2/8/2021    **CASE IN O.R. W/ GI STAFF - EGD WITH REMOVAL PEG TUBE performed by Joey Wiley MD at [facility]    HAND SURGERY      [facility]. CATH POWER PICC TRIPLE  03/04/2020         HERNIA REPAIR      Hiatal hernia    HYSTERECTOMY (CERVIX STATUS UNKNOWN)      Abdominal    IR NONTUNNELED VASCULAR CATHETER  5/10/2022    IR NONTUNNELED VASCULAR CATHETER 5/10/2022 Sunshine Ernst MD [facility] SPECIAL PROCEDURES    JOINT REPLACEMENT Right     right hip    OVARY REMOVAL      RHINOPLASTY      TONSILLECTOMY      TOTAL HIP ARTHROPLASTY      TOTAL NEPHRECTOMY      atrophic from infections, age 13    TRACHEOSTOMY N/A 03/27/2020    **ADD ON **WANTS 10:00AM **TRACHEOTOMY performed by Mary Cevallos MD at [address] [facility] N/A 04/02/2020    TRACHEOTOMY EXCHANGE performed by Mary Cevallos MD at [address] [facility] 03/17/2020    BEDSIDE EGD ESOPHAGOGASTRODUODENOSCOPY (ICU) performed by Mary Cevallos MD at [address] 05/18/2020    EGD BIOPSY performed by Ranjit Knapp MD at [address] [facility]  05/18/2020    EGD DILATION BALLOON performed by Ranjit Knapp MD at [address] [place] [address] N/A 07/06/2020    ** CASE IN O.R. WITH GI STAFF** EGD ESOPHAGOGASTRODUODENOSCOPY performed by Venkata Caballero MD at [address] [facility] 11/09/2020    EGD DIAGNOSTIC ONLY performed by Zulema Perez, MD at [facility]    UPPER GASTROINTESTINAL ENDOSCOPY  02/08/2021    - EGD WITH REMOVAL PEG TUBE,ERCP STENT REMOVAL,ERCP STONE REMOVAL    UPPER GASTROINTESTINAL ENDOSCOPY N/A 11/24/2021    EGD BIOPSY performed by Loida Argueta MD at [facility]     Family History   Problem Relation Age of Onset    Cancer Mother         uterine    Heart Attack Mother     Heart Attack Father     Other Maternal Grandfather         foot gangrene    Other Paternal Grandmother         bleeding ulcers    Other Paternal Grandfather         lung cancer     Previous records/history/ and notes were reviewed    Allergies: Allergies   Allergen Reactions    Prednisone Anxiety    Compazine [Prochlorperazine Maleate]     Penicillin V Potassium     Penicillins Other (See Comments)     Shock- received meropenem and ceftriaxone wo issues 2020       Home medications:  Prior to Admission medications    Medication Sig Start Date End Date Taking? Authorizing Provider   carboxymethylcellulose 1 % ophthalmic solution Place 1 drop into both eyes in the morning and 1 drop at noon and 1 drop in the evening and 1 drop before bedtime. Yes Historical Provider, MD   albuterol (PROVENTIL) (2.5 MG/3ML) 0.083% nebulizer solution Take 2.5 mg by nebulization every 6 hours as needed for Wheezing   Yes Historical Provider, MD   Dyphylline-guaiFENesin 100-100 MG/5ML LIQD Take 5 mLs by mouth every 4 hours as needed   Yes Historical Provider, MD   GUAIFENESIN PO Take 800 mg by mouth as needed   Yes Historical Provider, MD   magnesium hydroxide (MILK OF MAGNESIA) 400 MG/5ML suspension Take 30 mLs by mouth Give 30ml by mouth as needed for constipation no BM x3 days   Yes Historical Provider, MD   acetaminophen (TYLENOL) 500 MG tablet Take 1,000 mg by mouth in the morning and at bedtime   Yes Historical Provider, MD   ondansetron (ZOFRAN) 4 MG tablet Take 4 mg by mouth in the morning and 4 mg at noon and 4 mg in the evening and 4 mg before bedtime.    Yes Historical Provider, MD   clotrimazole-betamethasone (LOTRISONE) 1-0.05 % cream Apply topically 2 times daily Apply topically 2 times daily. 5/10/22   Nicholas Campos DO   Cholecalciferol (VITAMIN D) 50 MCG (2000 UT) CAPS capsule Take 1 capsule by mouth daily 5/10/22   Kenan Goetz DO   guaiFENesin (MUCINEX) 600 MG extended release tablet Take 400 mg by mouth in the morning. Indications: Cough. As needed for mucus. Historical Provider, MD   Lactobacillus TABS Take 1 tablet by mouth 2 times daily Take One tablet by mouth two times daily    Historical Provider, MD   fluticasone (FLONASE) 50 MCG/ACT nasal spray 1 spray by Each Nostril route daily    Historical Provider, MD   loratadine (CLARITIN) 10 MG capsule Take 10 mg by mouth daily    Historical Provider, MD   clonazePAM (KLONOPIN) 0.5 MG tablet Take 0.5 mg by mouth 2 times daily as needed. Historical Provider, MD   bisacodyl (DULCOLAX) 5 MG EC tablet TAKE 2 TABS AS INSTRUCTED BY THE OFFICE THE DAY PRIOR TO COLONOSCOPY 1/5/22   Antoine Houser MD   metoclopramide (REGLAN) 5 MG tablet Take 1 tablet by mouth 4 times daily 1/4/22   Antoine Houser MD   polyethylene glycol (GLYCOLAX) 17 g packet Take 17 g by mouth daily as needed for Constipation 12/19/21   Laura Koroma MD   QUEtiapine (SEROQUEL) 100 MG tablet Take 1 tablet by mouth nightly for 3 days 12/19/21 12/22/21  Laura Koroma MD   QUEtiapine (SEROQUEL) 25 MG tablet Take 1 tablet by mouth every morning (before breakfast) for 3 days 12/19/21 12/22/21  Laura Koroma MD   gabapentin (NEURONTIN) 300 MG capsule Take 1 capsule by mouth 3 times daily for 3 days.  12/19/21 1/28/22  Laura Koroma MD   escitalopram (LEXAPRO) 10 MG tablet Take 10 mg by mouth daily    Historical Provider, MD   Folic Acid-Cholecalciferol 1-2000 MG-UNIT TABS Take by mouth    Historical Provider, MD   busPIRone (BUSPAR) 10 MG tablet Take 1 tablet by mouth 2 times daily 11/30/21   Kennis Osler, MD   pantoprazole (PROTONIX) 40 MG tablet Take 1 tablet by mouth 2 times daily 11/25/21 1/28/22  Adri Saldana MD   traZODone (DESYREL) 100 MG tablet Take 1 tablet by mouth nightly for 7 days 11/25/21 12/18/21  Adri Saldana MD   bisacodyl (DULCOLAX) 10 MG suppository Place 10 mg rectally daily as needed for Constipation PRN for constipation no BM x4 days. Historical Provider, MD   Sodium Phosphates (FLEET) 7-19 GM/118ML Place 1 enema rectally As needed after no BM for 5 days    Historical Provider, MD   budesonide-formoterol (SYMBICORT) 160-4.5 MCG/ACT AERO Inhale 2 puffs into the lungs 2 times daily    Historical Provider, MD   ferrous sulfate (FE TABS 325) 325 (65 Fe) MG EC tablet Take 1 tablet by mouth daily (with breakfast) 12/16/20   MARGOTH Mccann - NP   amitriptyline (ELAVIL) 25 MG tablet Take 1 tablet by mouth nightly  Patient taking differently: Take 25 mg by mouth in the morning, at noon, and at bedtime 7/14/20   Yaritza Mann MD   metoprolol succinate (TOPROL XL) 25 MG extended release tablet Take 1 tablet by mouth daily 5/30/20   Sanju Zayas MD   sucralfate (CARAFATE) 1 GM tablet Take 1 tablet by mouth 4 times daily 4/6/20   Gretchen Valdez MD   Oyster Shell 500 MG TABS Take 500 mg by mouth 2 times daily     Historical Provider, MD   calcium carbonate (TUMS) 500 MG chewable tablet Take 1 tablet by mouth 3 times daily as needed for Heartburn    Historical Provider, MD   simvastatin (ZOCOR) 40 MG tablet Take 40 mg by mouth nightly.     Historical Provider, MD     .  Current Medications:  Scheduled Meds:   pantoprazole (PROTONIX) bolus  80 mg IntraVENous Once    meropenem  1,000 mg IntraVENous Q8H    cefepime  1,000 mg IntraVENous Once    budesonide-formoterol  2 puff Inhalation BID    busPIRone  10 mg Oral BID    escitalopram  10 mg Oral Daily    metoclopramide  5 mg Oral 4x Daily    atorvastatin  20 mg Oral Daily    sodium chloride flush  5-40 mL IntraVENous 2 times per day    heparin (porcine) 5,000 Units SubCUTAneous 3 times per day    metoprolol succinate  25 mg Oral Daily    senna  2 tablet Oral Nightly     . Continuous Infusions:   pantoprazole      sodium chloride      sodium chloride 75 mL/hr at 22     . PRN Meds:calcium carbonate, morphine, LORazepam, albuterol, [Held by provider] clonazePAM, sodium chloride flush, sodium chloride, ondansetron **OR** ondansetron, polyethylene glycol, acetaminophen **OR** acetaminophen, hydrALAZINE, guaiFENesin, HYDROcodone 5 mg - acetaminophen    REVIEW OF SYSTEMS:     Constitutional: No fever, no chills, no lethargy, no weakness, no weight loss  HEENT:  No headache, otalgia, itchy eyes, nasal discharge or sore throat. Cardiac:  No chest pain, dyspnea, orthopnea or PND. Chest:   No cough, phlegm or wheezing. Abdomen:  Lower abdominal pain  Neuro:  No focal weakness, abnormal movements or seizure like activity. Skin:   No rashes, no itching. :   No hematuria, no pyuria, no dysuria, no flank pain. Extremities:  No swelling or joint pains. ROS was otherwise negative except as mentioned in the [address] PHYSICAL EXAM:    /77   Pulse 88   Temp 98.6 °F (37 °C) (Oral)   Resp 16   Ht 5' 2\" (1.575 m)   Wt 179 lb (81.2 kg)   SpO2 96%   BMI 32.74 kg/m²   . TMAX[24]    General: Well developed, Well nourished, No apparent distress  Head:  Normocephalic, Atraumatic  EENT: EOMI, Sclera not icteric, Oropharynx moist  Neck:  Supple, Trachea midline  Lungs:CTA Bilaterally  Heart: RRR, No murmur, No rub, No gallop, PMI nondisplaced. Abdomen:Soft, Non tender, Not distended, BS WNL,  No masses. No hepatomegalia   Ext:No clubbing. No cyanosis. No edema. Skin: No rashes. No jaundice. No stigmata of liver disease. Neuro:  A&O x Three, No focal neurological deficits    Imagin2022 CT Abdomen and pelvis:  FINDINGS:   Lack of intravenous contrast administration, partially limiting evaluation of   the soft tissues and vasculature. LOWER CHEST:  Trace bilateral pleural effusions with minimal passive   atelectasis in the overlying bilateral lower lobes. Linear opacities in each   lung base due to scarring or subsegmental atelectasis. Mild cardiomegaly. Trace pericardial effusion. ORGANS:  Mild enlargement of the right hemiliver suggestive of a normal   variant Riedel's lobe. Surgically absent gallbladder and right kidney. No   intrahepatic nor extrahepatic biliary dilation. Moderate to severe   pancreatic atrophy. 0.3 cm simple appearing cyst in the posterior lower pole   of the left kidney (Bosniak category 2). Unchanged mild left hydronephrosis   likely due to chronic left ureteropelvic junction obstruction. Normal   appearance of the spleen and adrenal glands. GI/BOWEL:  Fluid within the distended distal thoracic esophagus, a potential   risk factor for aspiration. Small to moderate sliding hiatal hernia. Otherwise normal course and caliber of the stomach, small bowel, colon, and   rectum without obstruction. No visualization of the appendix, reportedly   surgically absent. Mild to moderate stool predominantly in the right   hemicolon. PELVIS:  Partially obscured by streak artifact from a right hip prosthesis. Surgically absent uterus and ovaries. Normal appearance of the urinary   bladder. PERITONEUM/RETROPERITONEUM:  Mild to moderate systemic atherosclerotic   calcifications. No abdominal nor pelvic lymphadenopathy. No free   intraperitoneal fluid nor gas. SOFT TISSUES/BONES:  Laxity of the anterior and lateral abdominal wall   musculature with diastasis recti. No inguinal lymphadenopathy. Partially   included changes of right hip total arthroplasty with no evident   complication. Diffuse bony demineralization. No acute fractures nor   suspicious bony lesions. Impression   1. No acute findings in the abdomen or pelvis.    2. Trace bilateral pleural effusions and trace pericardial effusion. 3. Incidental findings as above for which no dedicated follow-up is   recommended. Hemotological labs: Anemia studies:  No results for input(s): LABIRON, TIBC, FERRITIN, [id number], FOLATE, OCCULTBLD in the last 72 hours. CBC:  Recent Labs     07/31/22  1444 08/01/22  0536   WBC 6.0 3.9   HGB 15.1 14.9   MCV 87.5 93.6   RDW 15.3* 15.7*    See Reflexed IPF Result       PT/INR:  No results for input(s): PROTIME, INR in the last 72 hours. BMP:  Recent Labs     07/31/22  1444 08/01/22  0536   * 135   K 4.8 4.3   CL 95* 100   CO2 23 24   BUN 17 16   CREATININE 1.03* 0.95*   GLUCOSE 112* 110*   CALCIUM 9.6 8.8       Liver work up:  Hepatitis Functional Panel:  Recent Labs     07/31/22  1444   ALKPHOS 131*   ALT 37*   AST 50*   PROT 7.3   BILITOT 0.27*   LABALBU 4.3       Amylase/Lipase/Ammonia:  No results for input(s): AMYLASE, LIPASE, AMMONIA in the last 72 hours. Acute Hepatitis Panel:  Lab Results   Component Value Date/Time    HEPBSAG NONREACTIVE 05/16/2020 01:01 PM    HEPCAB NONREACTIVE 05/16/2020 01:01 PM    HEPBIGM NONREACTIVE 05/16/2020 01:01 PM    HEPAIGM NONREACTIVE 05/16/2020 01:01 PM            Principal Problem:    UTI (urinary tract infection)  Active Problems:    Depression    Anxiety    BENEDICT (acute kidney injury) ([facility] [address]    Essential hypertension    Tobacco abuse    Stage 3 chronic kidney disease ([facility] [address]  Resolved Problems:    * No resolved hospital problems. *       GI Impression and recommendations and plan:    43-year-old female with hematemesis/coffee-ground material x1 episode, UTI.  -No recent rectal bleeding, no NSAID use smoking or drinking. Patient compliant with medications. Hemoglobin stable 15 g/dL.   Constipation  -Suspect Sarah-Abrams tear    Recommend conservative approach at this time with Protonix 40 mg IV twice daily-we will change to oral tomorrow  Clear liquid diet  Monitor for active GI bleeding  Daily labs including CBC BMP  Bowel regimen ordered  Recommend PT OT, OOB etc  Limit narcotics  We will follow    This plan was formulated in collaboration with Dr. Roxanne Simms MD      Electronically signed by:  Andre Landaverde, [address] [place] [address] Gastroenterology  [phone]  8/1/2022    8:31 AM     This note was created with the assistance of a speech-recognition program.  Although the intention is to generate a document that actually reflects the content of the visit, no guarantees can be provided that every mistake has been identified and corrected by editing.

## 2022-08-01 NOTE — CONSULTS
[facility] [address] - Initial Consult Note  Today's Date and Time: 8/1/2022, 5:02 PM    Impression :   Recurrent UTI  BENEDICT  HTN  COPD  Constipation    Recommendations:   Continue with meropenem for now until definitive culture back    Medical Decision Making/Summary/Discussion:8/1/2022     Empiric therapy for ESBL with meropenem until definitive urine culture is back     Infection Control Recommendations   [place] Precautions  Contact Isolation     Antimicrobial Stewardship Recommendations     Simplification of therapy  Targeted therapy    Coordination of Outpatient Care:   Estimated Length of IV antimicrobials:TBD  Patient will need Midline Catheter Insertion: No  Patient will need PICC line Insertion:No  Patient will need: Home IV , [place],  SNF,  LTAC: TBD  Patient will need outpatient wound care:No    Chief complaint/reason for consultation:   Urinary tract infection, concern for ESBL      History of Present Illness:   Carolee Berry is a 68y.o.-year-old  female who was initially admitted on 7/31/2022. Patient seen at the request of Dr. Nathalia Ledesma. INITIAL HISTORY:      Patient has a history of recurrent UTI, isolated left kidney and COPD. She was admitted with lower abdominal pain and increased urinary frequency, urgency and dysuria. Patient admits to worsening lower abdominal pain and difficulty urinating for the last 4 days and has been worsening since then. Patient has a fever spike of 101 on admission and blood pressure of 140/80. Labs showed no leukocytosis  UA his trace leukocyte Estrace, negative nitrates. Growing gram-negative rods greater than 100,000 CFU. Labs, X rays reviewed: 8/1/2022    BUN: 17-> 16  Cr: 1.03-> 0.95. WBC: 6.0-> 3.9  Hb: 15.1-> 15.0  Plat: 127    Ultrasound kidney:  Tiny 1.2 cm lower pole left renal cortical cyst.   No overt left-sided hydronephrosis. Left ureteral jet is visualized. Prior right nephrectomy. Cultures:  Urine: Gram-negative rods greater than 100,000 CFU  Final culture awaited      Blood: No growth    Sputum :    Wound:      Discussed with patient, RN, family. I have personally reviewed the past medical history, past surgical history, medications, social history, and family history, and I have updated the database accordingly.   Past Medical History:     Past Medical History:   Diagnosis Date    Anxiety     Arthritis     Back pain     Bronchitis     Caffeine use     8 coffee / day    Carpal tunnel syndrome     Cataracts, bilateral     Constipation     COPD (chronic obstructive pulmonary disease) (HCC)     Emphysema    Depression     Diarrhea     GERD (gastroesophageal reflux disease)     H/O gastric ulcer     Hyperlipidemia     Hypertension     Mumps     MVP (mitral valve prolapse)     Dr. Elisabeth Nazario in May 2019    Recurrent UTI     Dr. Gabriella Jones    Sinusitis     Tracheostomy in place [facility])     Ulcerative esophagitis     Wellness examination     Dr. Abida Martin seen in Jan 2020       Past Surgical  History:     Past Surgical History:   Procedure Laterality Date    APPENDECTOMY      CARPAL TUNNEL RELEASE      CHOLECYSTECTOMY, LAPAROSCOPIC N/A 05/22/2020    XI LAPAROSCOPIC ROBOTIC CHOLECYSTECTOMY, PYLOROPLASTY performed by Lesley Cabrera MD at [address] [place] [address]      COLONOSCOPY N/A 1/28/2022    COLONOSCOPY POLYPECTOMY HOT performed by Nohemi Peabody, MD at [place] [address]      ERCP  05/21/2020    with stent insertion, balloon dilation, sphinctereotomy    ERCP  05/21/2020    ** CASE IN OR WITY GI STAFF** ERCP STENT INSERTION performed by Nohemi Peabody, MD at [facility]    ERCP  05/21/2020    ** CASE IN OR WITH GI STAFF**ERCP SPHINCTER/PAPILLOTOMY performed by Nohemi Peabody, MD at [facility]    ERCP  05/21/2020    ** CASE IN OR WITH GI STAFF**ERCP DILATION BALLOON performed by Nohemi Peabody, MD at [facility]    ERCP N/A 2/8/2021    ERCP STENT REMOVAL performed by Fredo Navarro MD at [facility]    ERCP  2/8/2021    ERCP STONE REMOVAL performed by Roberto Cowart MD at [address] [facility]    GASTRIC FUNDOPLICATION N/A 75/73/1889    XI LAPAROSCOPIC ROBOTIC HIATAL HERNIA REPAIR, CHERYL FUNDOPLICATION performed by Joesph Nolan MD at [address] [facility] [address] 03/27/2020    EGD PEG TUBE PLACEMENT performed by Joesph Nolan MD at [address] [facility] N/A 2/8/2021    **CASE IN O.R. W/ GI STAFF - EGD WITH REMOVAL PEG TUBE performed by Roberto Cowart MD at [facility]    HAND SURGERY      [facility]. CATH POWER PICC TRIPLE  03/04/2020         HERNIA REPAIR      Hiatal hernia    HYSTERECTOMY (CERVIX STATUS UNKNOWN)      Abdominal    IR NONTUNNELED VASCULAR CATHETER  5/10/2022    IR NONTUNNELED VASCULAR CATHETER 5/10/2022 Mickey Medina MD [facility]    JOINT REPLACEMENT Right     right hip    OVARY REMOVAL      RHINOPLASTY      TONSILLECTOMY      TOTAL HIP ARTHROPLASTY      TOTAL NEPHRECTOMY      atrophic from infections, age 13    TRACHEOSTOMY N/A 03/27/2020    **ADD ON **WANTS 10:00AM **TRACHEOTOMY performed by Joesph Nolan MD at [address] [facility] N/A 04/02/2020    TRACHEOTOMY EXCHANGE performed by Joesph Nolan MD at [address] [facility] 03/17/2020    BEDSIDE EGD ESOPHAGOGASTRODUODENOSCOPY (ICU) performed by Joesph Nolan MD at [place] [address] 05/18/2020    EGD BIOPSY performed by Pb Campbell MD at [place] [address]  05/18/2020    EGD DILATION BALLOON performed by Pb Campbell MD at [place] [address] N/A 07/06/2020    ** CASE IN O.R. WITH GI STAFF** EGD ESOPHAGOGASTRODUODENOSCOPY performed by Braxton Johnson MD at [place] [address] 11/09/2020    EGD DIAGNOSTIC ONLY performed by Cem Guzman MD at [facility] Grandfather         foot gangrene    Other Paternal Grandmother         bleeding ulcers    Other Paternal Grandfather         lung cancer        Allergies:   Prednisone, Compazine [prochlorperazine maleate], Penicillin v potassium, and Penicillins     Review of Systems:   Constitutional: No fevers or chills. No systemic complaints  Head: No headaches  Eyes: No double vision or blurry vision. No conjunctival inflammation. ENT: No sore throat or runny nose. . No hearing loss, tinnitus or vertigo. Cardiovascular: No chest pain or palpitations. No shortness of breath. No HOLLAND  Lung: No shortness of breath or cough. No sputum production  Abdomen: No nausea, vomiting, diarrhea,. Lower abdominal pain. Genitourinary: No increased urinary frequency, or dysuria. No hematuria. No suprapubic or CVA pain  Musculoskeletal: No muscle aches or pains. No joint effusions, swelling or deformities  Hematologic: No bleeding or bruising. Neurologic: No headache, weakness, numbness, or tingling. Integument: No rash, no ulcers. Psychiatric: No depression. Endocrine: No polyuria, no polydipsia, no polyphagia. Physical Examination :   Patient Vitals for the past 8 hrs:   Temp Temp [place]   08/01/22 1400 99.3 °F (37.4 °C) Oral     General Appearance: Awake, alert, and in no apparent distress  Head:  Normocephalic, no trauma  Eyes: Pupils equal, round, reactive to light and accommodation; extraocular movements intact; sclera anicteric; conjunctivae pink. No embolic phenomena. ENT: Oropharynx clear, without erythema, exudate, or thrush. No tenderness of sinuses. Mouth/throat: mucosa pink and moist. No lesions. Dentition in good repair. Neck:Supple, without lymphadenopathy. Thyroid normal, No bruits. Pulmonary/Chest: Clear to auscultation, without wheezes, rales, or rhonchi. No dullness to percussion. Cardiovascular: Regular rate and rhythm without murmurs, rubs, or gallops. Abdomen: Soft, non tender.  Bowel sounds normal. No Good  Discharge planning reviewed  Follow up as outpatient. Thank you for allowing us to participate in the care of this patient. Please call with questions. Latonia Zelaya MD  Internal Medicine Resident, PGY-2  [facility]:    I have discussed the case, including pertinent history and exam findings with the residents and students. I have seen and examined the patient and the key elements of the encounter have been performed by me. I was present when the student obtained his information or examined the patient. I have reviewed the laboratory data, other diagnostic studies and discussed them with the residents. I have updated the medical record where necessary. I agree with the assessment, plan and orders as documented by the resident/ student.     Rodrigue Joel MD.    8/1/2022,5:13 PM

## 2022-08-02 LAB
ABSOLUTE EOS #: 0.09 K/UL (ref 0–0.44)
ABSOLUTE IMMATURE GRANULOCYTE: 0.06 K/UL (ref 0–0.3)
ABSOLUTE LYMPH #: 0.73 K/UL (ref 1.1–3.7)
ABSOLUTE MONO #: 0.5 K/UL (ref 0.1–1.2)
ANION GAP SERPL CALCULATED.3IONS-SCNC: 13 MMOL/L (ref 9–17)
BASOPHILS # BLD: 1 % (ref 0–2)
BASOPHILS ABSOLUTE: 0.03 K/UL (ref 0–0.2)
BUN BLDV-MCNC: 12 MG/DL (ref 8–23)
CALCIUM SERPL-MCNC: 8.7 MG/DL (ref 8.6–10.4)
CHLORIDE BLD-SCNC: 100 MMOL/L (ref 98–107)
CO2: 21 MMOL/L (ref 20–31)
CREAT SERPL-MCNC: 0.86 MG/DL (ref 0.5–0.9)
EOSINOPHILS RELATIVE PERCENT: 2 % (ref 1–4)
GFR AFRICAN AMERICAN: >60 ML/MIN
GFR NON-AFRICAN AMERICAN: >60 ML/MIN
GFR SERPL CREATININE-BSD FRML MDRD: ABNORMAL ML/MIN/{1.73_M2}
GLUCOSE BLD-MCNC: 91 MG/DL (ref 70–99)
HCT VFR BLD CALC: 39.6 % (ref 36.3–47.1)
HCT VFR BLD CALC: 40.4 % (ref 36.3–47.1)
HCT VFR BLD CALC: 40.6 % (ref 36.3–47.1)
HEMOGLOBIN: 12.6 G/DL (ref 11.9–15.1)
HEMOGLOBIN: 13 G/DL (ref 11.9–15.1)
HEMOGLOBIN: 13.5 G/DL (ref 11.9–15.1)
IMMATURE GRANULOCYTES: 1 %
LYMPHOCYTES # BLD: 16 % (ref 24–43)
MCH RBC QN AUTO: 29.5 PG (ref 25.2–33.5)
MCHC RBC AUTO-ENTMCNC: 32 G/DL (ref 28.4–34.8)
MCV RBC AUTO: 92.3 FL (ref 82.6–102.9)
MONOCYTES # BLD: 11 % (ref 3–12)
NRBC AUTOMATED: 0 PER 100 WBC
PDW BLD-RTO: 15.8 % (ref 11.8–14.4)
PLATELET # BLD: ABNORMAL K/UL (ref 138–453)
PLATELET, FLUORESCENCE: 113 K/UL (ref 138–453)
PLATELET, IMMATURE FRACTION: 5.1 % (ref 1.1–10.3)
POTASSIUM SERPL-SCNC: 4 MMOL/L (ref 3.7–5.3)
RBC # BLD: 4.4 M/UL (ref 3.95–5.11)
RBC # BLD: ABNORMAL 10*6/UL
SEG NEUTROPHILS: 70 % (ref 36–65)
SEGMENTED NEUTROPHILS ABSOLUTE COUNT: 3.25 K/UL (ref 1.5–8.1)
SODIUM BLD-SCNC: 134 MMOL/L (ref 135–144)
WBC # BLD: 4.7 K/UL (ref 3.5–11.3)

## 2022-08-02 PROCEDURE — 6370000000 HC RX 637 (ALT 250 FOR IP)

## 2022-08-02 PROCEDURE — 97110 THERAPEUTIC EXERCISES: CPT

## 2022-08-02 PROCEDURE — 6370000000 HC RX 637 (ALT 250 FOR IP): Performed by: NURSE PRACTITIONER

## 2022-08-02 PROCEDURE — 6360000002 HC RX W HCPCS

## 2022-08-02 PROCEDURE — 99232 SBSQ HOSP IP/OBS MODERATE 35: CPT | Performed by: INTERNAL MEDICINE

## 2022-08-02 PROCEDURE — 85055 RETICULATED PLATELET ASSAY: CPT

## 2022-08-02 PROCEDURE — 97166 OT EVAL MOD COMPLEX 45 MIN: CPT

## 2022-08-02 PROCEDURE — 97530 THERAPEUTIC ACTIVITIES: CPT

## 2022-08-02 PROCEDURE — 6360000002 HC RX W HCPCS: Performed by: NURSE PRACTITIONER

## 2022-08-02 PROCEDURE — 80048 BASIC METABOLIC PNL TOTAL CA: CPT

## 2022-08-02 PROCEDURE — 2580000003 HC RX 258

## 2022-08-02 PROCEDURE — C9113 INJ PANTOPRAZOLE SODIUM, VIA: HCPCS | Performed by: NURSE PRACTITIONER

## 2022-08-02 PROCEDURE — 85014 HEMATOCRIT: CPT

## 2022-08-02 PROCEDURE — 6370000000 HC RX 637 (ALT 250 FOR IP): Performed by: STUDENT IN AN ORGANIZED HEALTH CARE EDUCATION/TRAINING PROGRAM

## 2022-08-02 PROCEDURE — 85025 COMPLETE CBC W/AUTO DIFF WBC: CPT

## 2022-08-02 PROCEDURE — 97535 SELF CARE MNGMENT TRAINING: CPT

## 2022-08-02 PROCEDURE — 2580000003 HC RX 258: Performed by: NURSE PRACTITIONER

## 2022-08-02 PROCEDURE — 85018 HEMOGLOBIN: CPT

## 2022-08-02 PROCEDURE — 36415 COLL VENOUS BLD VENIPUNCTURE: CPT

## 2022-08-02 PROCEDURE — 6360000002 HC RX W HCPCS: Performed by: STUDENT IN AN ORGANIZED HEALTH CARE EDUCATION/TRAINING PROGRAM

## 2022-08-02 PROCEDURE — 1200000000 HC SEMI PRIVATE

## 2022-08-02 PROCEDURE — 2580000003 HC RX 258: Performed by: STUDENT IN AN ORGANIZED HEALTH CARE EDUCATION/TRAINING PROGRAM

## 2022-08-02 RX ORDER — HYDROXYZINE HYDROCHLORIDE 10 MG/1
10 TABLET, FILM COATED ORAL ONCE
Status: COMPLETED | OUTPATIENT
Start: 2022-08-02 | End: 2022-08-02

## 2022-08-02 RX ORDER — TETRAHYDROZOLINE HCL 0.05 %
2 DROPS OPHTHALMIC (EYE) 3 TIMES DAILY
Status: DISCONTINUED | OUTPATIENT
Start: 2022-08-02 | End: 2022-08-05 | Stop reason: HOSPADM

## 2022-08-02 RX ORDER — PANTOPRAZOLE SODIUM 40 MG/1
40 TABLET, DELAYED RELEASE ORAL
Status: DISCONTINUED | OUTPATIENT
Start: 2022-08-03 | End: 2022-08-05 | Stop reason: HOSPADM

## 2022-08-02 RX ADMIN — METOPROLOL SUCCINATE 25 MG: 25 TABLET, FILM COATED, EXTENDED RELEASE ORAL at 08:09

## 2022-08-02 RX ADMIN — MORPHINE SULFATE 2 MG: 2 INJECTION, SOLUTION INTRAMUSCULAR; INTRAVENOUS at 14:50

## 2022-08-02 RX ADMIN — HEPARIN SODIUM 5000 UNITS: 5000 INJECTION INTRAVENOUS; SUBCUTANEOUS at 21:30

## 2022-08-02 RX ADMIN — HYDROCODONE BITARTRATE AND ACETAMINOPHEN 1 TABLET: 5; 325 TABLET ORAL at 17:33

## 2022-08-02 RX ADMIN — MEROPENEM 1000 MG: 1 INJECTION, POWDER, FOR SOLUTION INTRAVENOUS at 04:08

## 2022-08-02 RX ADMIN — CLONAZEPAM 0.5 MG: 0.5 TABLET ORAL at 06:56

## 2022-08-02 RX ADMIN — HYDROCODONE BITARTRATE AND ACETAMINOPHEN 1 TABLET: 5; 325 TABLET ORAL at 10:06

## 2022-08-02 RX ADMIN — HYDROXYZINE HYDROCHLORIDE 10 MG: 10 TABLET ORAL at 21:11

## 2022-08-02 RX ADMIN — GUAIFENESIN 200 MG: 200 SOLUTION ORAL at 21:12

## 2022-08-02 RX ADMIN — CLONAZEPAM 0.5 MG: 0.5 TABLET ORAL at 19:45

## 2022-08-02 RX ADMIN — BISACODYL 10 MG: 10 SUPPOSITORY RECTAL at 08:09

## 2022-08-02 RX ADMIN — TRAZODONE HYDROCHLORIDE 100 MG: 100 TABLET ORAL at 21:11

## 2022-08-02 RX ADMIN — MORPHINE SULFATE 2 MG: 2 INJECTION, SOLUTION INTRAMUSCULAR; INTRAVENOUS at 18:48

## 2022-08-02 RX ADMIN — HYDROCODONE BITARTRATE AND ACETAMINOPHEN 1 TABLET: 5; 325 TABLET ORAL at 04:11

## 2022-08-02 RX ADMIN — MEROPENEM 1000 MG: 1 INJECTION, POWDER, FOR SOLUTION INTRAVENOUS at 10:36

## 2022-08-02 RX ADMIN — DIPHENHYDRAMINE HYDROCHLORIDE 25 MG: 50 INJECTION, SOLUTION INTRAMUSCULAR; INTRAVENOUS at 17:33

## 2022-08-02 RX ADMIN — ATORVASTATIN CALCIUM 20 MG: 20 TABLET, FILM COATED ORAL at 08:09

## 2022-08-02 RX ADMIN — HEPARIN SODIUM 5000 UNITS: 5000 INJECTION INTRAVENOUS; SUBCUTANEOUS at 14:50

## 2022-08-02 RX ADMIN — SODIUM CHLORIDE, PRESERVATIVE FREE 10 ML: 5 INJECTION INTRAVENOUS at 08:09

## 2022-08-02 RX ADMIN — METOCLOPRAMIDE 5 MG: 5 TABLET ORAL at 04:12

## 2022-08-02 RX ADMIN — TETRAHYDROZOLINE HCL 2 DROP: 0.05 SOLUTION/ DROPS OPHTHALMIC at 17:34

## 2022-08-02 RX ADMIN — METOCLOPRAMIDE 5 MG: 5 TABLET ORAL at 15:34

## 2022-08-02 RX ADMIN — METOCLOPRAMIDE 5 MG: 5 TABLET ORAL at 21:10

## 2022-08-02 RX ADMIN — ESCITALOPRAM OXALATE 10 MG: 10 TABLET ORAL at 08:09

## 2022-08-02 RX ADMIN — MORPHINE SULFATE 2 MG: 2 INJECTION, SOLUTION INTRAMUSCULAR; INTRAVENOUS at 23:57

## 2022-08-02 RX ADMIN — SODIUM CHLORIDE, PRESERVATIVE FREE 40 MG: 5 INJECTION INTRAVENOUS at 06:29

## 2022-08-02 RX ADMIN — DIPHENHYDRAMINE HYDROCHLORIDE 25 MG: 50 INJECTION, SOLUTION INTRAMUSCULAR; INTRAVENOUS at 04:11

## 2022-08-02 RX ADMIN — ONDANSETRON 4 MG: 2 INJECTION INTRAMUSCULAR; INTRAVENOUS at 14:50

## 2022-08-02 RX ADMIN — ANTACID TABLETS 500 MG: 500 TABLET, CHEWABLE ORAL at 11:55

## 2022-08-02 RX ADMIN — HEPARIN SODIUM 5000 UNITS: 5000 INJECTION INTRAVENOUS; SUBCUTANEOUS at 06:29

## 2022-08-02 RX ADMIN — TETRAHYDROZOLINE HCL 2 DROP: 0.05 SOLUTION/ DROPS OPHTHALMIC at 21:22

## 2022-08-02 RX ADMIN — METOCLOPRAMIDE 5 MG: 5 TABLET ORAL at 08:08

## 2022-08-02 RX ADMIN — SENNOSIDES 17.2 MG: 8.6 TABLET, COATED ORAL at 21:10

## 2022-08-02 RX ADMIN — SODIUM CHLORIDE: 9 INJECTION, SOLUTION INTRAVENOUS at 12:35

## 2022-08-02 RX ADMIN — BUSPIRONE HYDROCHLORIDE 10 MG: 10 TABLET ORAL at 08:09

## 2022-08-02 RX ADMIN — BUSPIRONE HYDROCHLORIDE 10 MG: 10 TABLET ORAL at 21:11

## 2022-08-02 RX ADMIN — ONDANSETRON 4 MG: 2 INJECTION INTRAMUSCULAR; INTRAVENOUS at 07:36

## 2022-08-02 RX ADMIN — MEROPENEM 1000 MG: 1 INJECTION, POWDER, FOR SOLUTION INTRAVENOUS at 18:51

## 2022-08-02 RX ADMIN — DIPHENHYDRAMINE HYDROCHLORIDE 25 MG: 50 INJECTION, SOLUTION INTRAMUSCULAR; INTRAVENOUS at 10:06

## 2022-08-02 RX ADMIN — SODIUM CHLORIDE, PRESERVATIVE FREE 10 ML: 5 INJECTION INTRAVENOUS at 21:12

## 2022-08-02 RX ADMIN — QUETIAPINE FUMARATE 100 MG: 100 TABLET ORAL at 21:11

## 2022-08-02 ASSESSMENT — PAIN SCALES - GENERAL
PAINLEVEL_OUTOF10: 10
PAINLEVEL_OUTOF10: 8
PAINLEVEL_OUTOF10: 7
PAINLEVEL_OUTOF10: 8
PAINLEVEL_OUTOF10: 8

## 2022-08-02 ASSESSMENT — PAIN DESCRIPTION - ORIENTATION
ORIENTATION: RIGHT
ORIENTATION: RIGHT

## 2022-08-02 ASSESSMENT — PAIN DESCRIPTION - LOCATION
LOCATION: ABDOMEN

## 2022-08-02 ASSESSMENT — PAIN DESCRIPTION - DESCRIPTORS
DESCRIPTORS: STABBING;SHARP
DESCRIPTORS: ACHING;DISCOMFORT
DESCRIPTORS: ACHING;DISCOMFORT

## 2022-08-02 NOTE — PROGRESS NOTES
Occupational Therapy  Facility/Department: [facility]  Occupational Therapy Initial Assessment    Name: Anjum Garza  : 1945  MRN: [id number]  Date of Service: 2022    Discharge Recommendations: Further therapy recommended at discharge. Patient would benefit from continued therapy after discharge  OT Equipment Recommendations  Equipment Needed: No       Patient Diagnosis(es): The primary encounter diagnosis was Acute cystitis without hematuria. A diagnosis of Septicemia ([facility] [address] was also pertinent to this visit. Past Medical History:  has a past medical history of Anxiety, Arthritis, Back pain, Bronchitis, Caffeine use, Carpal tunnel syndrome, Cataracts, bilateral, Constipation, COPD (chronic obstructive pulmonary disease) ([facility] [address] Depression, Diarrhea, GERD (gastroesophageal reflux disease), H/O gastric ulcer, Hyperlipidemia, Hypertension, Mumps, MVP (mitral valve prolapse), Recurrent UTI, Sinusitis, Tracheostomy in place [facility]), Ulcerative esophagitis, and Wellness examination. Past Surgical History:  has a past surgical history that includes Hysterectomy; total nephrectomy; Tonsillectomy; Appendectomy; Cystoscopy; Ovary removal; Tubal ligation; rhinoplasty; Carpal tunnel release; Hand surgery; Total hip arthroplasty; eye surgery; Colonoscopy; joint replacement (Right); Gastric fundoplication (N/A, ); hc cath power picc triple (2020); Upper gastrointestinal endoscopy (N/A, 2020); tracheostomy (N/A, 2020); Gastrostomy tube placement (N/A, 2020); tracheostomy (N/A, 2020); Upper gastrointestinal endoscopy (N/A, 2020); Upper gastrointestinal endoscopy (2020); ERCP (2020); ERCP (2020); ERCP (2020); ERCP (2020); Cholecystectomy, laparoscopic (N/A, 2020); Upper gastrointestinal endoscopy (N/A, 2020); Upper gastrointestinal endoscopy (N/A, 2020); Upper gastrointestinal endoscopy (2021);  Gastrostomy tube placement (N/A, 2/8/2021); ERCP (N/A, 2/8/2021); ERCP (2/8/2021); Upper gastrointestinal endoscopy (N/A, 11/24/2021); hernia repair; Colonoscopy (N/A, 1/28/2022); and IR NONTUNNELED VASCULAR CATHETER > 5 YEARS (5/10/2022). Assessment   Performance deficits / Impairments: Decreased functional mobility ; Decreased ADL status; Decreased safe awareness;Decreased cognition;Decreased endurance;Decreased balance;Decreased high-level IADLs  Prognosis: Good  Decision Making: Medium Complexity  REQUIRES OT FOLLOW-UP: Yes  Activity Tolerance  Activity Tolerance: Patient Tolerated treatment well  Activity Tolerance Comments: pt limited by pain and dizziness        Plan   Plan  Times per Week: 3-4x  Current Treatment Recommendations: Balance training, Functional mobility training, Endurance training, Gait training, Pain management, Cognitive reorientation, Safety education & training, Patient/Caregiver education & training, Self-Care / ADL, Home management training     Restrictions  Restrictions/Precautions  Restrictions/Precautions: Fall Risk, General Precautions, Up as Tolerated, Contact Precautions  Required Braces or Orthoses?: No  Position Activity Restriction  Other position/activity restrictions: up w assist    Subjective   General  Patient assessed for rehabilitation services?: Yes  Family / Caregiver Present: No  Diagnosis: UTI  Subjective  Subjective: pt reported 7/10 pain in abdomen at start of session, verbalized increased pain throughout entire session.      Social/Functional History  Social/Functional History  Lives With: Other (comment)  Type of Home: Facility  Home Layout: One level  Home Access: Level entry  Bathroom Shower/Tub: Walk-in shower  Bathroom Toilet: Handicap height  Bathroom Equipment: Grab bars in shower, Shower chair, Toilet raiser  Bathroom Accessibility: Accessible  Home Equipment: [address] Help From: Family  ADL Assistance: Needs assistance  Toileting: Needs assistance  Homemaking Assistance: Needs assistance  Homemaking Responsibilities: No  Ambulation Assistance: Needs assistance (uses manual wc at facility)  Transfer Assistance: Needs assistance  Active : No  Mode of Transportation: Family (either son madhu or her facility transports to appointments)  Leisure & Hobbies: ipad, watch netflix  Additional Comments: Patient says she walks back and forth to her bathroom alone at facility. Later upon further questioning, she clarifies that she rolls into the bathroom with her wheelchair and does her own \"swivel\" transfer. Objective   SpO2: 92 %  O2 Device: None (Room air)             Safety Devices  Type of Devices: Bed alarm in place;Call light within reach;Gait belt; Heels elevated for pressure relief;Patient at risk for falls; Left in bed;Nurse notified  Restraints  Restraints Initially in Place: No  Bed Mobility Training  Bed Mobility Training: Yes  Supine to Sit: Stand-by assistance  Sit to Supine: Stand-by assistance  Scooting: Contact-guard assistance  Balance  Sitting: Intact (Pt sat unsupported EOB at SUP ~15 mins)  Standing: Impaired (pt stood using RW at min A ~6 mins. pt reported increase in shakiness and dizziness.)  Transfer Training  Transfer Training: Yes  Sit to Stand: Minimum assistance  Stand to Sit: Minimum assistance  Toilet Transfer: Minimum assistance  Gait  Overall Level of Assistance: Minimum assistance (pt demo'd func mob around room/ to bathroom using RW at min A, with reports of increased dizziness and shakiness.)     AROM: Within functional limits (BUEs hands, elbows, and shoulders AROM WFL)  PROM: Within functional limits  Strength: Within functional limits (BUEs hands, elbows, and shoulders 5/5 strength)  Coordination: Within functional limits  Tone: Normal  Sensation: Intact  ADL  Feeding: Independent;Setup; Increased time to complete  Grooming: Minimal assistance;Setup; Increased time to complete  UE Bathing: Minimal assistance;Setup; Increased time to complete  LE Bathing: Moderate assistance;Setup; Increased time to complete  UE Dressing: Contact guard assistance;Setup; Increased time to complete  LE Dressing: Minimal assistance;Setup; Increased time to complete  Toileting: Moderate assistance  Additional Comments: pt began session supine in bed. Pt sat unsupported EOB, demo'd donning R sock by bending at waist, pt educated on sock aid and reacher. Pt demo'd func mob using RW at min A, demo'd toileting using grab bars to sit/stand from toilet. Pt required mod A to wipe self while standing at toilet. Pt sat unsupported EOB at SUP, demo'd grooming task of UB bathing. Pt requiring min A to wring towel and open/close soap, per pt d/t not eating \"all day\". Pt used BUEs to wash UB at SUP. Pt ended session supine in bed. Activity Tolerance  Activity Tolerance: Patient limited by fatigue;Patient limited by pain (limited by nausea)        Vision  Vision: Impaired  Vision Exceptions: Wears glasses for reading  Hearing  Hearing: Within functional limits      Cognition  Overall Cognitive Status: Exceptions  Arousal/Alertness: Appropriate responses to stimuli  Following Commands: Follows one step commands consistently  Attention Span: Attends with cues to redirect  Memory: Decreased short term memory;Decreased recall of recent events;Decreased recall of precautions  Safety Judgement: Decreased awareness of need for assistance  Problem Solving: Assistance required to generate solutions;Assistance required to identify errors made  Insights: Decreased awareness of deficits  Initiation: Requires cues for some  Sequencing: Requires cues for some  Cognition Comments: pt guerrero difficulty word finding  Orientation  Overall Orientation Status: Within Functional Limits  Orientation Level: Oriented X4                  Education Given To: Patient  Education Provided: Role of Therapy;Plan of Care;ADL Adaptive Strategies;Transfer Training; Fall Prevention Strategies; Equipment  Education Provided Comments: pt educated on reacher and sock aid  Education Method: Demonstration;Verbal  Barriers to Learning: None  Education Outcome: Verbalized understanding;Demonstrated understanding  LUE AROM (degrees)  LUE AROM : WFL (L elbow and shoulder AROM WFL)  Left Hand AROM (degrees)  Left Hand AROM: WFL  RUE AROM (degrees)  RUE AROM : WFL (R elbow and shoulder AROM WFL)  Right Hand AROM (degrees)  Right Hand AROM: [facility]                      AM-PAC Score        AM-PAC Inpatient Daily Activity Raw Score: 18 (08/02/22 1344)  AM-PAC Inpatient ADL T-Scale Score : 38.66 (08/02/22 1344)  ADL Inpatient CMS 0-100% Score: 46.65 (08/02/22 1344)  ADL Inpatient CMS G-Code Modifier : CK (08/02/22 1344)         Goals  Short Term Goals  Time Frame for Short term goals: pt will by discharge  Short Term Goal 1: demo UB bathing/dressing with SUP. Short Term Goal 2: demo LB bathing/dressing with SBA, AE PRN. Short Term Goal 3: demo dynamic standing during func tasks for 10+ mins at SBA, LRD PRN, no rest breaks. Short Term Goal 4: demo good safety awareness during func mob with ADL tasks at SBA, LRD PRN. Short Term Goal 5: demo safe bed mob independently.        Therapy Time   Individual Concurrent Group Co-treatment   Time In 9723         Time Out 1229         Minutes 52         Timed Code Treatment Minutes: 130 [facility] Forrest [facility] S/OT

## 2022-08-02 NOTE — PROGRESS NOTES
[facility] - Progress Note  Today's Date and Time: 8/2/2022, 11:40 AM    Impression :   Recurrent UTI  BENEDICT  HTN  COPD  Constipation    Recommendations:   Continue with meropenem for now until definitive culture back    Medical Decision Making/Summary/Discussion:8/2/2022     Empiric therapy for ESBL with meropenem until definitive urine culture is back     Infection Control Recommendations   Grandy Precautions  Contact Isolation     Antimicrobial Stewardship Recommendations     Simplification of therapy  Targeted therapy    Coordination of Outpatient Care:   Estimated Length of IV antimicrobials:TBD  Patient will need Midline Catheter Insertion: No  Patient will need PICC line Insertion:No  Patient will need: Home IV , [place],  SNF,  LTAC: TBD  Patient will need outpatient wound care:No    Chief complaint/reason for consultation:   Urinary tract infection, concern for ESBL      History of Present Illness:   Keesha Gtz is a 68y.o.-year-old  female who was initially admitted on 7/31/2022. Patient seen at the request of Dr. Adonay Epstein. INITIAL HISTORY:      Patient has a history of recurrent UTI, isolated left kidney and COPD. She was admitted with lower abdominal pain and increased urinary frequency, urgency and dysuria. Patient admits to worsening lower abdominal pain and difficulty urinating for the last 4 days and has been worsening since then. Patient has a fever spike of 101 on admission and blood pressure of 140/80. Labs showed no leukocytosis  UA his trace leukocyte Estrace, negative nitrates. Growing gram-negative rods greater than 100,000 CFU. CURRENT EVALUATION :8/2/2022  Afebrile  VSS  On room air     Urinary symptoms improving but still complaining of nausea and abdominal discomfort. Labs, X rays reviewed: 8/2/2022    BUN: 17-> 16  Cr: 1.03-> 0.95.     WBC: 6.0-> 3.9  Hb: 15.1-> 15.0  Plat: 127    Ultrasound kidney:  Tiny 1.2 cm lower pole left renal cortical cyst.   No overt left-sided hydronephrosis. Left ureteral jet is visualized. Prior right nephrectomy. Cultures:  Urine: Klebsiella pneumoniae greater than 100,000 CFU        Blood: No growth    Sputum :    Wound:      Discussed with patient, RN, IM. I have personally reviewed the past medical history, past surgical history, medications, social history, and family history, and I have updated the database accordingly.   Past Medical History:     Past Medical History:   Diagnosis Date    Anxiety     Arthritis     Back pain     Bronchitis     Caffeine use     8 coffee / day    Carpal tunnel syndrome     Cataracts, bilateral     Constipation     COPD (chronic obstructive pulmonary disease) (HCC)     Emphysema    Depression     Diarrhea     GERD (gastroesophageal reflux disease)     H/O gastric ulcer     Hyperlipidemia     Hypertension     Mumps     MVP (mitral valve prolapse)     Dr. Mariam Loaiza in May 2019    Recurrent UTI     Dr. Lima Leak    Sinusitis     Tracheostomy in place [facility])     Ulcerative esophagitis     Wellness examination     Dr. Christine Hernandez seen in Jan 2020       Past Surgical  History:     Past Surgical History:   Procedure Laterality Date    APPENDECTOMY      CARPAL TUNNEL RELEASE      CHOLECYSTECTOMY, LAPAROSCOPIC N/A 05/22/2020    XI LAPAROSCOPIC ROBOTIC CHOLECYSTECTOMY, PYLOROPLASTY performed by Nicky Loja MD at [address]      COLONOSCOPY N/A 1/28/2022    COLONOSCOPY POLYPECTOMY HOT performed by Kristy Bernstein MD at [place] [address]      ERCP  05/21/2020    with stent insertion, balloon dilation, sphinctereotomy    ERCP  05/21/2020    ** CASE IN OR WITY GI STAFF** ERCP STENT INSERTION performed by Kristy Bernstein MD at [facility]    ERCP  05/21/2020    ** CASE IN OR WITH GI STAFF**ERCP SPHINCTER/PAPILLOTOMY performed by Kristy Bernstein MD at [facility]    ERCP  05/21/2020    ** CASE IN OR WITH GI STAFF**ERCP DILATION BALLOON performed by [facility] at [facility]    ERCP N/A 2/8/2021    ERCP STENT REMOVAL performed by Marney Hammans, MD at [facility]    ERCP  2/8/2021    ERCP STONE REMOVAL performed by Marney Hammans, MD at [address] [facility]    GASTRIC FUNDOPLICATION N/A 58/84/8300    XI LAPAROSCOPIC ROBOTIC [facility], CHERYL FUNDOPLICATION performed by Renny Cox MD at [address] [facility] 03/27/2020    EGD PEG TUBE PLACEMENT performed by Renny Cox MD at [place] [address] N/A 2/8/2021    **CASE IN O.R. W/ GI STAFF - EGD WITH REMOVAL PEG TUBE performed by Marney Hammans, MD at [facility]    HAND SURGERY      [facility] CATH POWER PICC TRIPLE  03/04/2020         HERNIA REPAIR      Hiatal hernia    HYSTERECTOMY (CERVIX STATUS UNKNOWN)      Abdominal    IR NONTUNNELED VASCULAR CATHETER  5/10/2022    IR NONTUNNELED VASCULAR CATHETER 5/10/2022 Chantel Perkins MD [facility]    JOINT REPLACEMENT Right     right hip    OVARY REMOVAL      RHINOPLASTY      TONSILLECTOMY      TOTAL HIP ARTHROPLASTY      TOTAL NEPHRECTOMY      atrophic from infections, age 13    TRACHEOSTOMY N/A 03/27/2020    **ADD ON **WANTS 10:00AM **TRACHEOTOMY performed by Renny Cox MD at [address] [facility] N/A 04/02/2020    TRACHEOTOMY EXCHANGE performed by Renny Cox MD at [address] [place] [address] 03/17/2020    BEDSIDE EGD ESOPHAGOGASTRODUODENOSCOPY (ICU) performed by Renny Cox MD at [address] 05/18/2020    EGD BIOPSY performed by Kia Zurita MD at [facility] [address]  05/18/2020    EGD DILATION BALLOON performed by Kia Zurita MD at [facility] [address] N/A 07/06/2020    ** CASE IN O.R. WITH GI STAFF** EGD ESOPHAGOGASTRODUODENOSCOPY performed by Tram Sanchez MD at [place] [address] Violence: Not on file   Housing Stability: Not on file       Family History:     Family History   Problem Relation Age of Onset    Cancer Mother         uterine    Heart Attack Mother     Heart Attack Father     Other Maternal Grandfather         foot gangrene    Other Paternal Grandmother         bleeding ulcers    Other Paternal Grandfather         lung cancer        Allergies:   Prednisone, Compazine [prochlorperazine maleate], Penicillin v potassium, and Penicillins     Review of Systems:   Constitutional: No fevers or chills. No systemic complaints  Head: No headaches  Eyes: No double vision or blurry vision. No conjunctival inflammation. ENT: No sore throat or runny nose. . No hearing loss, tinnitus or vertigo. Cardiovascular: No chest pain or palpitations. No shortness of breath. No HOLLAND  Lung: No shortness of breath or cough. No sputum production  Abdomen: No nausea, vomiting, diarrhea,. Lower abdominal pain. Genitourinary: No increased urinary frequency, or dysuria. No hematuria. No suprapubic or CVA pain  Musculoskeletal: No muscle aches or pains. No joint effusions, swelling or deformities  Hematologic: No bleeding or bruising. Neurologic: No headache, weakness, numbness, or tingling. Integument: No rash, no ulcers. Psychiatric: No depression. Endocrine: No polyuria, no polydipsia, no polyphagia. Physical Examination :   Patient Vitals for the past 8 hrs:   BP Temp Temp src Pulse Resp SpO2   08/02/22 0920 -- -- -- 85 -- --   08/02/22 0722 120/73 98.4 °F (36.9 °C) Oral 85 17 92 %       General Appearance: Awake, alert, and in no apparent distress  Head:  Normocephalic, no trauma  Eyes: Pupils equal, round, reactive to light and accommodation; extraocular movements intact; sclera anicteric; conjunctivae pink. No embolic phenomena. ENT: Oropharynx clear, without erythema, exudate, or thrush. No tenderness of sinuses. Mouth/throat: mucosa pink and moist. No lesions.  Dentition in good repair. Neck:Supple, without lymphadenopathy. Thyroid normal, No bruits. Pulmonary/Chest: Clear to auscultation, without wheezes, rales, or rhonchi. No dullness to percussion. Cardiovascular: Regular rate and rhythm without murmurs, rubs, or gallops. Abdomen: Soft, non tender. Bowel sounds normal. No organomegaly  All four Extremities: No cyanosis, clubbing, edema, or effusions. Neurologic: No gross sensory or motor deficits. Skin: Warm and dry with good turgor. No signs of peripheral arterial or venous insufficiency. No ulcerations. No open wounds. Medical Decision Making -Laboratory:   I have independently reviewed/ordered the following labs:    CBC with Differential:   Recent Labs     08/01/22  0536 08/01/22  0758 08/01/22  2114 08/02/22  0641   WBC 3.9  --   --  4.7   HGB 14.9   < > 14.0 13.0   HCT 46.7   < > 42.6 40.6   PLT See Reflexed IPF Result  --   --  See Reflexed IPF Result   LYMPHOPCT 11*  --   --  16*   MONOPCT 8*  --   --  11    < > = values in this interval not displayed. BMP:   Recent Labs     07/31/22  1444 08/01/22  0536 08/02/22  0641   * 135 134*   K 4.8 4.3 4.0   CL 95* 100 100   CO2 23 24 21   BUN 17 16 12   CREATININE 1.03* 0.95* 0.86   MG 1.7  --   --        Hepatic Function Panel:   Recent Labs     07/31/22  1444   PROT 7.3   LABALBU 4.3   BILITOT 0.27*   ALKPHOS 131*   ALT 37*   AST 50*       No results for input(s): RPR in the last 72 hours. No results for input(s): HIV in the last 72 hours. No results for input(s): BC in the last 72 hours.   Lab Results   Component Value Date/Time    MUCUS NOT REPORTED 12/18/2021 12:26 PM    RBC 4.40 08/02/2022 06:41 AM    TRICHOMONAS NOT REPORTED 12/18/2021 12:26 PM    WBC 4.7 08/02/2022 06:41 AM    YEAST NOT REPORTED 12/18/2021 12:26 PM    TURBIDITY Clear 07/31/2022 03:53 PM     Lab Results   Component Value Date/Time    CREATININE 0.86 08/02/2022 06:41 AM    GLUCOSE 91 08/02/2022 06:41 AM       Medical Decision Making-Imaging: Medical Decision Anhoot-Odfakxbh-Rhltd:     urine culture . Medical Decision Making-Other:     Note:  Labs, medications, radiologic studies were reviewed with personal review of films  Moderate Large amounts of data were reviewed  Discussed with nursing Staff, Discharge planner  Infection Control and Prevention measures reviewed  All prior entries were reviewed  Administer medications as ordered  Prognosis: Good  Discharge planning reviewed  Follow up as outpatient. Thank you for allowing us to participate in the care of this patient. Please call with questions. Marguerita Boas, MD  Internal Medicine Resident, PGY-2  [facility]:    I have discussed the case, including pertinent history and exam findings with the residents and students. I have seen and examined the patient and the key elements of the encounter have been performed by me. I was present when the student obtained his information or examined the patient. I have reviewed the laboratory data, other diagnostic studies and discussed them with the residents. I have updated the medical record where necessary. I agree with the assessment, plan and orders as documented by the resident/ student.     Nori Figueroa MD.    8/2/2022,11:40 AM

## 2022-08-02 NOTE — PROGRESS NOTES
Physical Therapy  Facility/Department: [facility]  Daily treatment note    Name: Bassam Rosenberg  : 1945  MRN: [id number]  Date of Service: 2022    Discharge Recommendations:  Patient would benefit from continued therapy after discharge   PT Equipment Recommendations  Equipment Needed: No      Patient Diagnosis(es): The primary encounter diagnosis was Acute cystitis without hematuria. A diagnosis of Septicemia ([facility] [facility] was also pertinent to this visit. Past Medical History:  has a past medical history of Anxiety, Arthritis, Back pain, Bronchitis, Caffeine use, Carpal tunnel syndrome, Cataracts, bilateral, Constipation, COPD (chronic obstructive pulmonary disease) ([facility] [address] Depression, Diarrhea, GERD (gastroesophageal reflux disease), H/O gastric ulcer, Hyperlipidemia, Hypertension, Mumps, MVP (mitral valve prolapse), Recurrent UTI, Sinusitis, Tracheostomy in place [facility]), Ulcerative esophagitis, and Wellness examination. Past Surgical History:  has a past surgical history that includes Hysterectomy; total nephrectomy; Tonsillectomy; Appendectomy; Cystoscopy; Ovary removal; Tubal ligation; rhinoplasty; Carpal tunnel release; Hand surgery; Total hip arthroplasty; eye surgery; Colonoscopy; joint replacement (Right); Gastric fundoplication (N/A, ); hc cath power picc triple (2020); Upper gastrointestinal endoscopy (N/A, 2020); tracheostomy (N/A, 2020); Gastrostomy tube placement (N/A, 2020); tracheostomy (N/A, 2020); Upper gastrointestinal endoscopy (N/A, 2020); Upper gastrointestinal endoscopy (2020); ERCP (2020); ERCP (2020); ERCP (2020); ERCP (2020); Cholecystectomy, laparoscopic (N/A, 2020); Upper gastrointestinal endoscopy (N/A, 2020); Upper gastrointestinal endoscopy (N/A, 2020); Upper gastrointestinal endoscopy (2021);  Gastrostomy tube placement (N/A, 2021); ERCP (N/A, 2021); ERCP (2021); Upper gastrointestinal endoscopy (N/A, 11/24/2021); hernia repair; Colonoscopy (N/A, 1/28/2022); and IR NONTUNNELED VASCULAR CATHETER > 5 YEARS (5/10/2022). Assessment   Body Structures, Functions, Activity Limitations Requiring Skilled Therapeutic Intervention: Decreased functional mobility ; Decreased strength;Decreased posture;Decreased sensation;Decreased endurance; Increased pain;Decreased cognition  Assessment: Pt ambulates 1 ft + 3 ft RW and CGA. pt currently is a high fall risk d/t decreased balance and endurance, and is expected to require 24 hr assistance for functional mobility at d/c. pt would benefit from continued therapy to promote endurance, balance, and strengthening. Therapy Prognosis: Good  Decision Making: Medium Complexity  Barriers to Learning: none  Requires PT Follow-Up: Yes  Activity Tolerance  Activity Tolerance: Patient limited by fatigue;Patient limited by pain (limited by nausea)     Plan   Plan  Plan:  (5-6x)  Current Treatment Recommendations: Strengthening, ROM, Patient/Caregiver education & training, Therapeutic activities, Functional mobility training, Transfer training, Safety education & training, Home exercise program, ADL/Self-care training, IADL training, Gait training, Equipment evaluation, education, & procurement, Endurance training, Balance training, Neuromuscular re-education  Safety Devices  Type of Devices: Call light within reach, Gait belt, Nurse notified, All fall risk precautions in place, Left in bed     Restrictions  Restrictions/Precautions  Restrictions/Precautions: Fall Risk  Required Braces or Orthoses?: No  Position Activity Restriction  Other position/activity restrictions: up w assist     Subjective   General  Patient assessed for rehabilitation services?: Yes  Response To Previous Treatment: Patient with no complaints from previous session.   Family / Caregiver Present: No  Follows Commands: Within Functional Limits  Subjective  Subjective: RN and pt agreeable to PT. pt agreeable and pleasant. Pt supine in bed at start of session, c/o very high L flank pain. Cognition   Orientation  Overall Orientation Status: Impaired  Orientation Level: Oriented to person;Disoriented to time;Disoriented to situation;Oriented to place  Cognition  Overall Cognitive Status: Exceptions  Arousal/Alertness: Appropriate responses to stimuli  Following Commands: Follows one step commands with repetition; Follows one step commands with increased time  Attention Span: Attends with cues to redirect  Memory: Decreased short term memory;Decreased recall of recent events;Decreased recall of precautions  Safety Judgement: Decreased awareness of need for assistance;Decreased awareness of need for safety  Problem Solving: Decreased awareness of errors  Insights: Decreased awareness of deficits  Initiation: Requires cues for all  Sequencing: Requires cues for all                          Bed mobility  Supine to Sit: Contact guard assistance  Sit to Supine: Contact guard assistance  Scooting: Contact guard assistance  Bed Mobility Comments: HOB elevated. Increased time and effort for task d/t nausea. Transfers  Sit to Stand: Minimal Assistance;Contact guard assistance  Stand to sit: Minimal Assistance;Contact guard assistance  Comment: Min A for initial stand, CGA for second stand. Pulls from RW with both hands. Ambulation  Surface: level tile  Device: Rolling Walker  Assistance: Contact guard assistance  Gait Deviations: Slow Jaleesa;Decreased step length;Decreased step height  Distance: 1ft + 3 ft  Comments: No significant LOB noted. Pt reporting nausea t/o session, and high pain levels.   More Ambulation?: No  Stairs/Curb  Stairs?: No     Balance  Posture: Good  Sitting - Static: Good  Sitting - Dynamic: Good;-  Standing - Static: Fair  Standing - Dynamic: Fair  Comments: RW for UE support                     Seated LE exercise program: Long Arc Quads, hip abduction/adduction, heel/toe raises, and marches. Reps: 10x AROM BLE                                     AM-PAC Score  AM-PAC Inpatient Mobility Raw Score : 17 (08/02/22 1159)  AM-PAC Inpatient T-Scale Score : 42.13 (08/02/22 1159)  Mobility Inpatient CMS 0-100% Score: 50.57 (08/02/22 1159)  Mobility Inpatient CMS G-Code Modifier : CK (08/02/22 1159)              Goals  Short Term Goals  Time Frame for Short term goals: 14 visits  Short term goal 1: Supine to/from sit with SBA. Short term goal 2: Sit to/from stand with SBA. Short term goal 3: Pivot chair to bed CGA.        Education  Patient Education  Education Given To: Patient  Education Provided: Role of Therapy;Plan of Care;Transfer Training;Precautions  Education Method: Demonstration;Verbal  Barriers to Learning: Cognition  Education Outcome: Continued education needed      Therapy Time   Individual Concurrent Group Co-treatment   Time In 0935         Time Out 1019         Minutes 44         Timed Code Treatment Minutes: [address], PT

## 2022-08-02 NOTE — FLOWSHEET NOTE
[facility]  PROGRESS NOTE    Shift date: 8/2/2022  Shift day: Tuesday   Shift # 1    Room # 9533/8737-67   Name: Alma Delia Leonard                Mu-ism: Latter day   Place of Bahai:  [place]    Referral: Routine Visit    Admit Date & Time: 7/31/2022  2:20 PM    Assessment:  Alma Delia Leonard is a 68 y.o. female in the hospital. Upon entering the room writer observes patient present and awaking. Patient indicated that she was experiencing pain and appeared tearful when sharing it. Intervention:  Writer introduced self and title as  Writer offered space for patient  to express feelings, needs, and concerns and provided a ministry presence.  engaged in conversation with the patient and provided active and compassionate listening. Patient shared that she is Latter day. Patient expressed not wanting to continue speaking at this time, seemingly due to the pain she was experiencing. Outcome:   said no problem; patient was grateful for the visit and conversation of the . Plan:  Chaplains will remain available to offer spiritual and emotional support as needed. Patient may benefit from a follow-up visit. Electronically signed by Francisco Ceballos, on 8/2/2022 at 4:40 PM.  [facility]  [phone]       08/02/22 1634   Encounter Summary   Service Provided For: Patient   Referral/Consult From: Multi-disciplinary team;Rounding   Support System Children   Last Encounter  08/02/22   Complexity of Encounter Moderate   Begin Time 1540   End Time  1543   Total Time Calculated 3 min   Encounter    Type Initial Screen/Assessment   Assessment/Intervention/Outcome   Assessment Anxious; Coping;Tearful   Intervention Active listening;Discussed illness injury and its impact;Sustaining Presence/Ministry of presence   Outcome Coping;Engaged in conversation;Expressed feelings, needs, and concerns;Expressed Gratitude;Receptive   Plan and Referrals   Plan/Referrals Continue to visit, (comment)

## 2022-08-02 NOTE — PLAN OF CARE
Problem: Pain  Goal: Verbalizes/displays adequate comfort level or baseline comfort level  8/2/2022 1751 by Gallo Le RN  Outcome: Not Progressing  8/2/2022 0549 by Bruno Lopes RN  Outcome: Progressing     Problem: Safety - Adult  Goal: Free from fall injury  8/2/2022 1751 by Gallo Le RN  Outcome: Progressing  8/2/2022 0549 by Bruno Lopes RN  Outcome: Progressing     Problem: ABCDS Injury Assessment  Goal: Absence of physical injury  8/2/2022 1751 by Gallo Le RN  Outcome: Progressing  8/2/2022 0549 by Bruno Lopes RN  Outcome: Progressing     Problem: Skin/Tissue Integrity  Goal: Absence of new skin breakdown  Description: 1. Monitor for areas of redness and/or skin breakdown  2. Assess vascular access sites hourly  3. Every 4-6 hours minimum:  Change oxygen saturation probe site  4. Every 4-6 hours:  If on nasal continuous positive airway pressure, respiratory therapy assess nares and determine need for appliance change or resting period.   Outcome: Progressing

## 2022-08-02 NOTE — PROGRESS NOTES
[facility]. [facility]   Gastroenterology Progress Note    Holly Anglin is a 68 y.o. female patient. Hospitalization Day:2      Chief consult reason:     Active hematemesis     Subjective:  Pt seen and examined. No acute events overnight. Pt had small bowel movement-no rectal bleeding or hematemesis. Hgb stable 13. Denies any abdominal pain, nausea or vomiting    VITALS:  /73   Pulse 85   Temp 98.4 °F (36.9 °C) (Oral)   Resp 17   Ht 5' 2\" (1.575 m)   Wt 179 lb (81.2 kg)   SpO2 92%   BMI 32.74 kg/m²   TEMPERATURE:  Current - Temp: 98.4 °F (36.9 °C); Max - Temp  Av.7 °F (37.1 °C)  Min: 98.4 °F (36.9 °C)  Max: 99.3 °F (37.4 °C)    Physical Assessment:  General appearance:  alert, cooperative and no distress  Mental Status:  oriented to person, place and time and normal affect  Lungs:  clear to auscultation bilaterally, normal effort  Heart:  regular rate and rhythm, no murmur  Abdomen:  soft, nontender, nondistended, normal bowel sounds, no masses, hepatomegaly, splenomegaly  Extremities:  no edema, redness, tenderness in the calves  Skin:  no gross lesions, rashes, induration    Data Review:    Labs and Imaging:     CBC:  Recent Labs     22  1444 22  0536 22  0758 22  1252 22  2114 22  0641   WBC 6.0 3.9  --   --   --  4.7   HGB 15.1 14.9 14.8 15.0 14.0 13.0   MCV 87.5 93.6  --   --   --  92.3   RDW 15.3* 15.7*  --   --   --  15.8*    See Reflexed IPF Result  --   --   --  See Reflexed IPF Result       ANEMIA STUDIES:  No results for input(s): LABIRON, TIBC, FERRITIN, [id number], FOLATE, OCCULTBLD in the last 72 hours.     BMP:  Recent Labs     22  1444 22  0536 22  0641   * 135 134*   K 4.8 4.3 4.0   CL 95* 100 100   CO2 23 24 21   BUN 17 16 12   CREATININE 1.03* 0.95* 0.86   GLUCOSE 112* 110* 91   CALCIUM 9.6 8.8 8.7       LFTS:  Recent Labs     22  1444   ALKPHOS 131*   ALT 37*   AST 50*   BILITOT 0.27*   LABALBU 4.3       Amylase/Lipase and Ammonia:  No results for input(s): AMYLASE, LIPASE, AMMONIA in the last 72 hours. Acute Hepatitis Panel:  Lab Results   Component Value Date/Time    HEPBSAG NONREACTIVE 05/16/2020 01:01 PM    HEPCAB NONREACTIVE 05/16/2020 01:01 PM    HEPBIGM NONREACTIVE 05/16/2020 01:01 PM    HEPAIGM NONREACTIVE 05/16/2020 01:01 PM       HCV Genotype:  No results found for: HEPATITISCGENOTYPE    HCV Quantitative:  No results found for: HCVQNT    LIVER WORK UP:    AFP  No results found for: AFP    Alpha 1 antitrypsin   No results found for: A1A    TOÑO  Lab Results   Component Value Date/Time    TOÑO NEGATIVE 05/16/2020 01:01 PM       AMA  Lab Results   Component Value Date/Time    MITOAB 9.7 05/16/2020 01:01 PM       ASMA  Lab Results   Component Value Date/Time    SMOOTHMUSCAB 15 05/16/2020 01:01 PM       PT/INR  No results for input(s): PROTIME, INR in the last 72 hours. Cancer Markers:  CEA:  No results for input(s): CEA in the last 72 hours. Ca 125:  No results for input(s):  in the last 72 hours. Ca 19-9:   Invalid input(s):   AFP: No results for input(s): AFP in the last 72 hours. Lactic acid:Invalid input(s): LACTIC ACID    Radiology Review:    No results found. Principal Problem:    UTI (urinary tract infection)  Active Problems:    ESBL (extended spectrum beta-lactamase) producing bacteria infection    Complicated UTI (urinary tract infection)    Depression    Anxiety    BENEDICT (acute kidney injury) ([facility] [address]    Essential hypertension    Tobacco abuse    Coffee ground emesis    Stage 3 chronic kidney disease (HCC)  Resolved Problems:    * No resolved hospital problems. *       GI Impression:    Hematemesis-resolved. Hgb stable, pt tolerating clears, no rectal bleeding.  Had small brown stool  Constipation-improving    Plan and Recommendations:    DC IV Protonix and change to Protonix 40 mg po daily  Diet as tolerated  Encourage OOB, PT/OT, up to chair to facilitate BM's  No further recommendations-GI will sign off      This plan was formulated in collaboration with Dr. Floyd Diaz MD    Thank you for allowing me to participate in the care of your patient. Please feel free to contact me with any questions or concerns. Moshe Beltrán [address] [facility] Gastroenterology   Penelopesam Randle, [address] [place] [address] Road   [phone]  8/2/2022  12:43 PM    This note was created with the assistance of a speech-recognition program.  Although the intention is to generate a document that actually reflects the content of the visit, no guarantees can be provided that every mistake has been identified and corrected by editing.

## 2022-08-02 NOTE — PROGRESS NOTES
[facility] - Progress note  Today's Date and Time: 8/2/2022, 11:37 AM    Impression :   Recurrent UTI  BENEDICT  HTN  COPD  Constipation  Thrombocytopenia    Recommendations:   Continue with meropenem for now until definitive culture back    Medical Decision Making/Summary/Discussion:8/2/2022     Empiric therapy for ESBL with meropenem until definitive urine culture is back     Infection Control Recommendations   Carpenter Precautions  Contact Isolation     Antimicrobial Stewardship Recommendations     Simplification of therapy  Targeted therapy    Coordination of Outpatient Care:   Estimated Length of IV antimicrobials:TBD  Patient will need Midline Catheter Insertion: No  Patient will need PICC line Insertion:No  Patient will need: Home IV , [place],  SNF,  LTAC: TBD  Patient will need outpatient wound care:No    Chief complaint/reason for consultation:   Urinary tract infection, concern for ESBL      History of Present Illness:   Nikki Berry is a 68y.o.-year-old  female who was initially admitted on 7/31/2022. Patient seen at the request of Dr. Marina Hall. INITIAL HISTORY:  Patient has a history of recurrent UTI, isolated left kidney and COPD. She was admitted with lower abdominal pain and increased urinary frequency, urgency and dysuria. Patient admits to worsening lower abdominal pain and difficulty urinating for the last 4 days and has been worsening since then. Patient has a fever spike of 101 on admission and blood pressure of 140/80. Labs showed no leukocytosis  UA his trace leukocyte Estrace, negative nitrates. Growing gram-negative rods greater than 100,000 CFU. CURRENT EVALUATION 8/2/2022    Afebrile  VSS  On room air    Urinary symptoms improving but still complaining of nausea and abdominal discomfort.      Labs, X rays reviewed: 8/2/2022    BUN: 17-> 12  Cr: 1.03-> 0.95-> 0.86    WBC: 6.0- 3.9-> 4.7  Hb: 15.1-> 13  Plat: 127-> 113    Ultrasound kidney:  Tiny 1.2 cm lower pole left renal cortical cyst.   No overt left-sided hydronephrosis. Left ureteral jet is visualized. Prior right nephrectomy. Cultures:  Urine: Gram-negative rods greater than 100,000 CFU  Klebsiella Pneumonia >100,000 CFU/ML      Blood: No growth    Sputum :    Wound:      Discussed with patient, RN, family. I have personally reviewed the past medical history, past surgical history, medications, social history, and family history, and I have updated the database accordingly.   Past Medical History:     Past Medical History:   Diagnosis Date    Anxiety     Arthritis     Back pain     Bronchitis     Caffeine use     8 coffee / day    Carpal tunnel syndrome     Cataracts, bilateral     Constipation     COPD (chronic obstructive pulmonary disease) (HCC)     Emphysema    Depression     Diarrhea     GERD (gastroesophageal reflux disease)     H/O gastric ulcer     Hyperlipidemia     Hypertension     Mumps     MVP (mitral valve prolapse)     Dr. Lauro Zacarias in May 2019    Recurrent UTI     Dr. Gonzales Fast    Sinusitis     Tracheostomy in place [facility])     Ulcerative esophagitis     Wellness examination     Dr. Chuck Watson seen in Jan 2020       Past Surgical  History:     Past Surgical History:   Procedure Laterality Date    APPENDECTOMY      [address], LAPAROSCOPIC N/A 05/22/2020    XI LAPAROSCOPIC ROBOTIC CHOLECYSTECTOMY, PYLOROPLASTY performed by Nicol Funk MD at [facility] 36      COLONOSCOPY N/A 1/28/2022    COLONOSCOPY POLYPECTOMY HOT performed by Alonso Reyes MD at [facility] 27      ERCP  05/21/2020    with stent insertion, balloon dilation, sphinctereotomy    ERCP  05/21/2020    ** CASE IN OR WITY GI STAFF** ERCP STENT INSERTION performed by Alonso Reyes MD at [facility]    ERCP  05/21/2020    ** CASE IN OR WITH GI STAFF**ERCP SPHINCTER/PAPILLOTOMY performed by Alonso Reyes MD at [facility]    ERCP 05/21/2020    ** CASE IN OR WITH GI STAFF**ERCP DILATION BALLOON performed by Fide Arriaza MD at [facility]    ERCP N/A 2/8/2021    ERCP STENT REMOVAL performed by Bart Sommers MD at [facility]    ERCP  2/8/2021    ERCP STONE REMOVAL performed by Bart Sommers MD at [address] [facility]    GASTRIC FUNDOPLICATION N/A 20/32/0419    XI LAPAROSCOPIC ROBOTIC [address], CHERYL FUNDOPLICATION performed by Brittney Joyner MD at [address] [facility] 03/27/2020    EGD PEG TUBE PLACEMENT performed by Brittney Joyner MD at [address] [facility] N/A 2/8/2021    **CASE IN O.R. W/ GI STAFF - EGD WITH REMOVAL PEG TUBE performed by Bart Sommers MD at [facility]    HAND SURGERY      [facility] CATH POWER PICC TRIPLE  03/04/2020         HERNIA REPAIR      Hiatal hernia    HYSTERECTOMY (CERVIX STATUS UNKNOWN)      Abdominal    IR NONTUNNELED VASCULAR CATHETER  5/10/2022    IR NONTUNNELED VASCULAR CATHETER 5/10/2022 Jeana Llanos MD [facility]    JOINT REPLACEMENT Right     right hip    OVARY REMOVAL      RHINOPLASTY      TONSILLECTOMY      TOTAL HIP ARTHROPLASTY      TOTAL NEPHRECTOMY      atrophic from infections, age 13    TRACHEOSTOMY N/A 03/27/2020    **ADD ON **WANTS 10:00AM **TRACHEOTOMY performed by Brittney Joyner MD at [address] [facility] N/A 04/02/2020    TRACHEOTOMY EXCHANGE performed by Brittney Joyner MD at [facility] 03/17/2020    BEDSIDE EGD ESOPHAGOGASTRODUODENOSCOPY (ICU) performed by [facility] MD at [facility] 05/18/2020    EGD BIOPSY performed by Fide Arriaza MD at [facility]  05/18/2020    EGD DILATION BALLOON performed by Fide Arriaza MD at [facility] 07/06/2020    ** CASE IN O.R. WITH GI STAFF** EGD file   Stress: Not on file   Social Connections: Not on file   Intimate Partner Violence: Not on file   Housing Stability: Not on file       Family History:     Family History   Problem Relation Age of Onset    Cancer Mother         uterine    Heart Attack Mother     Heart Attack Father     Other Maternal Grandfather         foot gangrene    Other Paternal Grandmother         bleeding ulcers    Other Paternal Grandfather         lung cancer        Allergies:   Prednisone, Compazine [prochlorperazine maleate], Penicillin v potassium, and Penicillins     Review of Systems:   Constitutional: No fevers or chills. No systemic complaints  Head: No headaches  Eyes: No double vision or blurry vision. No conjunctival inflammation. ENT: No sore throat or runny nose. . No hearing loss, tinnitus or vertigo. Cardiovascular: No chest pain or palpitations. No shortness of breath. No HOLLAND  Lung: No shortness of breath or cough. No sputum production  Abdomen: No nausea, vomiting, diarrhea,. Lower abdominal pain. Genitourinary: No increased urinary frequency, or dysuria. No hematuria. No suprapubic or CVA pain  Musculoskeletal: No muscle aches or pains. No joint effusions, swelling or deformities  Hematologic: No bleeding or bruising. Neurologic: No headache, weakness, numbness, or tingling. Integument: No rash, no ulcers. Psychiatric: No depression. Endocrine: No polyuria, no polydipsia, no polyphagia. Physical Examination :   Patient Vitals for the past 8 hrs:   BP Temp Temp src Pulse Resp SpO2   08/02/22 0920 -- -- -- 85 -- --   08/02/22 0722 120/73 98.4 °F (36.9 °C) Oral 85 17 92 %       General Appearance: Awake, alert, and in no apparent distress  Head:  Normocephalic, no trauma  Eyes: Pupils equal, round, reactive to light and accommodation; extraocular movements intact; sclera anicteric; conjunctivae pink. No embolic phenomena. ENT: Oropharynx clear, without erythema, exudate, or thrush. No tenderness of sinuses. Mouth/throat: mucosa pink and moist. No lesions. Dentition in good repair. Neck:Supple, without lymphadenopathy. Thyroid normal, No bruits. Pulmonary/Chest: Clear to auscultation, without wheezes, rales, or rhonchi. No dullness to percussion. Cardiovascular: Regular rate and rhythm without murmurs, rubs, or gallops. Abdomen: Soft, non tender. Bowel sounds normal. No organomegaly  All four Extremities: No cyanosis, clubbing, edema, or effusions. Neurologic: No gross sensory or motor deficits. Skin: Warm and dry with good turgor. No signs of peripheral arterial or venous insufficiency. No ulcerations. No open wounds. Medical Decision Making -Laboratory:   I have independently reviewed/ordered the following labs:    CBC with Differential:   Recent Labs     08/01/22  0536 08/01/22  0758 08/01/22  2114 08/02/22  0641   WBC 3.9  --   --  4.7   HGB 14.9   < > 14.0 13.0   HCT 46.7   < > 42.6 40.6   PLT See Reflexed IPF Result  --   --  See Reflexed IPF Result   LYMPHOPCT 11*  --   --  16*   MONOPCT 8*  --   --  11    < > = values in this interval not displayed. BMP:   Recent Labs     07/31/22  1444 08/01/22  0536 08/02/22  0641   * 135 134*   K 4.8 4.3 4.0   CL 95* 100 100   CO2 23 24 21   BUN 17 16 12   CREATININE 1.03* 0.95* 0.86   MG 1.7  --   --        Hepatic Function Panel:   Recent Labs     07/31/22  1444   PROT 7.3   LABALBU 4.3   BILITOT 0.27*   ALKPHOS 131*   ALT 37*   AST 50*       No results for input(s): RPR in the last 72 hours. No results for input(s): HIV in the last 72 hours. No results for input(s): BC in the last 72 hours.   Lab Results   Component Value Date/Time    MUCUS NOT REPORTED 12/18/2021 12:26 PM    RBC 4.40 08/02/2022 06:41 AM    TRICHOMONAS NOT REPORTED 12/18/2021 12:26 PM    WBC 4.7 08/02/2022 06:41 AM    YEAST NOT REPORTED 12/18/2021 12:26 PM    TURBIDITY Clear 07/31/2022 03:53 PM     Lab Results   Component Value Date/Time    CREATININE 0.86 08/02/2022 06:41 AM GLUCOSE 91 08/02/2022 06:41 AM       Medical Decision Making-Imaging:       Medical Decision Dnldis-Ikzcdnvx-Dqulk:     urine culture . Medical Decision Making-Other:     Note:  Labs, medications, radiologic studies were reviewed with personal review of films  Moderate Large amounts of data were reviewed  Discussed with nursing Staff, Discharge planner  Infection Control and Prevention measures reviewed  All prior entries were reviewed  Administer medications as ordered  Prognosis: Good  Discharge planning reviewed  Follow up as outpatient. Thank you for allowing us to participate in the care of this patient. Please call with questions. Chet Barry MD  Internal Medicine Resident, PGY-2  [facility]:    I have discussed the case, including pertinent history and exam findings with the residents and students. I have seen and examined the patient and the key elements of the encounter have been performed by me. I was present when the student obtained his information or examined the patient. I have reviewed the laboratory data, other diagnostic studies and discussed them with the residents. I have updated the medical record where necessary. I agree with the assessment, plan and orders as documented by the resident/ student.     Indra Gastelum MD.

## 2022-08-02 NOTE — ACP (ADVANCE CARE PLANNING)
Advance Care Planning     Advance Care Planning Activator (Inpatient)  Conversation Note      Date of ACP Conversation: 8/2/2022     Conversation Conducted with: Patient with Decision Making Capacity    ACP Activator: Viviana Parham RN    Health Care Decision Maker:     Current Designated Health Care Decision Maker:     Primary Decision Maker: Amber Aurelia [facility] - [phone]    Care Preferences    Ventilation: \"If you were in your present state of health and suddenly became very ill and were unable to breathe on your own, what would your preference be about the use of a ventilator (breathing machine) if it were available to you? \"      Would the patient desire the use of ventilator (breathing machine)?: yes    \"If your health worsens and it becomes clear that your chance of recovery is unlikely, what would your preference be about the use of a ventilator (breathing machine) if it were available to you? \"     Would the patient desire the use of ventilator (breathing machine)?: Yes      Resuscitation  \"CPR works best to restart the heart when there is a sudden event, like a heart attack, in someone who is otherwise healthy. Unfortunately, CPR does not typically restart the heart for people who have serious health conditions or who are very sick. \"    \"In the event your heart stopped as a result of an underlying serious health condition, would you want attempts to be made to restart your heart (answer \"yes\" for attempt to resuscitate) or would you prefer a natural death (answer \"no\" for do not attempt to resuscitate)? \" yes      Length of ACP Conversation in minutes:  2    Conversation Outcomes:  [x] ACP discussion completed

## 2022-08-02 NOTE — PROGRESS NOTES
[facility]  Internal Medicine Teaching Residency Program  Inpatient Daily Progress Note  ______________________________________________________________________________    Patient: Carolee Berry  YOB: 1945   GXK:[id number]    Acct: [de-identified]     Room[facility]  Admit date: 2022  Today's date: 22  Number of days in the hospital: 2    SUBJECTIVE   Admitting Diagnosis: UTI (urinary tract infection)  CC: Abdominal pain  Pt examined at bedside. Chart & results reviewed. Patient examined at bedside. Charts and results were reviewed  No new complaints of coffee-ground hematemesis  Patient hemodynamically stable  Patient complains of having nausea  Patient also complains of having decreased appetite. ROS:  Constitutional:  negative for chills, fevers, sweats  Respiratory:  negative for cough, dyspnea on exertion, hemoptysis, shortness of breath, wheezing  Cardiovascular:  negative for chest pain, chest pressure/discomfort, lower extremity edema, palpitations  Gastrointestinal:  negative for abdominal pain, constipation, diarrhea, nausea, vomiting  Neurological:  negative for dizziness, headache  BRIEF HISTORY   Patient, 68years old, female presented to the hospital with a complains of recurrent UTI. Patient states that she is experiencing recurrent UTI since the age of 13. She had had a right nephrectomy due to the recurrent UTI. Patient denies any injury, trauma, falls. Patient denies any chest pain, shortness of breath, lightheadedness and dizziness. Patient complains of having abdominal pain especially in the lower quadrant.   Patient also feels constipated    OBJECTIVE     Vital Signs:  /73   Pulse 85   Temp 98.4 °F (36.9 °C) (Oral)   Resp 17   Ht 5' 2\" (1.575 m)   Wt 179 lb (81.2 kg)   SpO2 92%   BMI 32.74 kg/m²     Temp (24hrs), Av.4 °F (36.9 °C), Min:98.4 °F (36.9 °C), Max:98.4 °F (36.9 °C)    In: 2483.1   Out: - Physical Exam:  Constitutional: This is a well developed, well nourished, 30-34.9 - Obesity Grade I 68y.o. year old female who is alert, oriented, cooperative and in no apparent distress. Head:normocephalic and atraumatic. EENT:  PERRLA. No conjunctival injections. Septum was midline, mucosa was without erythema, exudates or cobblestoning. No thrush was noted. Neck: Supple without thyromegaly. No elevated JVP. Trachea was midline. Respiratory: Chest was symmetrical without dullness to percussion. Breath sounds bilaterally were clear to auscultation. There were no wheezes, rhonchi or rales. There is no intercostal retraction or use of accessory muscles. No egophony noted. Cardiovascular: Regular without murmur, clicks, gallops or rubs. Abdomen: Tenderness suprapubic. Lymphatic: No lymphadenopathy. Musculoskeletal: Normal curvature of the spine. No gross muscle weakness. Extremities:  No lower extremity edema, ulcerations, tenderness, varicosities or erythema. Muscle size, tone and strength are normal.  No involuntary movements are noted. Skin:  Warm and dry. Good color, turgor and pigmentation. No lesions or scars.   No cyanosis or clubbing  Neurological/Psychiatric: The patient's general behavior, level of consciousness, thought content and emotional status is normal.        Medications:  Scheduled Medications:    [START ON 8/3/2022] pantoprazole  40 mg Oral QAM AC    tetrahydrozoline  2 drop Both Eyes TID    meropenem  1,000 mg IntraVENous Q8H    bisacodyl  10 mg Rectal BID    alteplase  1 mg IntraCATHeter Once    QUEtiapine  100 mg Oral Nightly    traZODone  100 mg Oral Nightly    budesonide-formoterol  2 puff Inhalation BID    busPIRone  10 mg Oral BID    escitalopram  10 mg Oral Daily    metoclopramide  5 mg Oral 4x Daily    atorvastatin  20 mg Oral Daily    sodium chloride flush  5-40 mL IntraVENous 2 times per day    heparin (porcine)  5,000 Units SubCUTAneous 3 times per day metoprolol succinate  25 mg Oral Daily    senna  2 tablet Oral Nightly     Continuous Infusions:    sodium chloride      sodium chloride 75 mL/hr at 08/02/22 1235     PRN Medicationscalcium carbonate, 500 mg, TID PRN  morphine, 2 mg, Q4H PRN  diphenhydrAMINE, 25 mg, Q6H PRN  albuterol, 2.5 mg, Q6H PRN  clonazePAM, 0.5 mg, BID PRN  sodium chloride flush, 5-40 mL, PRN  sodium chloride, , PRN  ondansetron, 4 mg, Q8H PRN   Or  ondansetron, 4 mg, Q6H PRN  polyethylene glycol, 17 g, Daily PRN  acetaminophen, 650 mg, Q6H PRN   Or  acetaminophen, 650 mg, Q6H PRN  hydrALAZINE, 10 mg, Q6H PRN  guaiFENesin, 200 mg, Q4H PRN  HYDROcodone 5 mg - acetaminophen, 1 tablet, Q6H PRN        Diagnostic Labs:  CBC:   Recent Labs     07/31/22  1444 08/01/22  0536 08/01/22  0758 08/01/22  2114 08/02/22  0641 08/02/22  1355   WBC 6.0 3.9  --   --  4.7  --    RBC 5.06 4.99  --   --  4.40  --    HGB 15.1 14.9   < > 14.0 13.0 12.6   HCT 44.3 46.7   < > 42.6 40.6 39.6   MCV 87.5 93.6  --   --  92.3  --    RDW 15.3* 15.7*  --   --  15.8*  --     See Reflexed IPF Result  --   --  See Reflexed IPF Result  --     < > = values in this interval not displayed. BMP:   Recent Labs     07/31/22  1444 08/01/22  0536 08/02/22  0641   * 135 134*   K 4.8 4.3 4.0   CL 95* 100 100   CO2 23 24 21   BUN 17 16 12   CREATININE 1.03* 0.95* 0.86     BNP: No results for input(s): BNP in the last 72 hours. PT/INR: No results for input(s): PROTIME, INR in the last 72 hours. APTT: No results for input(s): APTT in the last 72 hours. CARDIAC ENZYMES: No results for input(s): CKMB, CKMBINDEX, TROPONINI in the last 72 hours.     Invalid input(s): CKTOTAL;3  FASTING LIPID PANEL:  Lab Results   Component Value Date    CHOL 203 (H) 12/14/2020    HDL 43 12/14/2020    TRIG 268 (H) 12/14/2020     LIVER PROFILE:   Recent Labs     07/31/22  1444   AST 50*   ALT 37*   BILITOT 0.27*   ALKPHOS 131*      MICROBIOLOGY:   Lab Results   Component Value Date/Time nausea      DVT ppx :  Heparin  GI ppx:     PT/OT: Will follow up  Discharge Planning / SW:  consulted. Will follow up    Isabelle López MD  Internal Medicine Resident, PGY-1  [place] [facility];  [place], [place]  8/2/2022, 2:20 PM

## 2022-08-02 NOTE — PLAN OF CARE
Problem: Pain  Goal: Verbalizes/displays adequate comfort level or baseline comfort level  8/2/2022 0549 by Spring Barroso RN  Outcome: Progressing  8/1/2022 1632 by Judi Camargo RN  Outcome: Progressing     Problem: Safety - Adult  Goal: Free from fall injury  8/2/2022 0549 by Spring Barroso RN  Outcome: Progressing  8/1/2022 1632 by Judi Camargo RN  Outcome: Progressing     Problem: ABCDS Injury Assessment  Goal: Absence of physical injury  8/2/2022 0549 by Spring Barroso RN  Outcome: Progressing  8/1/2022 1632 by Judi Camargo RN  Outcome: Progressing

## 2022-08-02 NOTE — CARE COORDINATION
Transitional planning note: received call from miriam with [facility] confirming that patient is a bed hold and can return to facility at discharge. Also inquire regarding code status for which I was transferred to the [address] [place] [address] at [facility] and spoke with nurse Nanette Frias who confirms that patient is a full code which is consistent with patient's desires per ACP discussion.

## 2022-08-03 ENCOUNTER — APPOINTMENT (OUTPATIENT)
Dept: CT IMAGING | Age: 77
DRG: 690 | End: 2022-08-03
Payer: MEDICARE

## 2022-08-03 LAB
ABSOLUTE EOS #: 0 K/UL (ref 0–0.4)
ABSOLUTE IMMATURE GRANULOCYTE: 0.08 K/UL (ref 0–0.3)
ABSOLUTE LYMPH #: 0.42 K/UL (ref 1–4.8)
ABSOLUTE MONO #: 0.17 K/UL (ref 0.1–0.8)
ANION GAP SERPL CALCULATED.3IONS-SCNC: 9 MMOL/L (ref 9–17)
BASOPHILS # BLD: 0 % (ref 0–2)
BASOPHILS ABSOLUTE: 0 K/UL (ref 0–0.2)
BUN BLDV-MCNC: 10 MG/DL (ref 8–23)
CALCIUM SERPL-MCNC: 8.5 MG/DL (ref 8.6–10.4)
CHLORIDE BLD-SCNC: 101 MMOL/L (ref 98–107)
CO2: 21 MMOL/L (ref 20–31)
CREAT SERPL-MCNC: 0.85 MG/DL (ref 0.5–0.9)
EOSINOPHILS RELATIVE PERCENT: 0 % (ref 1–4)
GFR AFRICAN AMERICAN: >60 ML/MIN
GFR NON-AFRICAN AMERICAN: >60 ML/MIN
GFR SERPL CREATININE-BSD FRML MDRD: ABNORMAL ML/MIN/{1.73_M2}
GLUCOSE BLD-MCNC: 87 MG/DL (ref 70–99)
HCT VFR BLD CALC: 40.4 % (ref 36.3–47.1)
HCT VFR BLD CALC: 41.1 % (ref 36.3–47.1)
HCT VFR BLD CALC: 41.4 % (ref 36.3–47.1)
HEMOGLOBIN: 12.5 G/DL (ref 11.9–15.1)
HEMOGLOBIN: 13 G/DL (ref 11.9–15.1)
HEMOGLOBIN: 13.1 G/DL (ref 11.9–15.1)
IMMATURE GRANULOCYTES: 3 %
LYMPHOCYTES # BLD: 15 % (ref 24–44)
MCH RBC QN AUTO: 29.3 PG (ref 25.2–33.5)
MCHC RBC AUTO-ENTMCNC: 30.9 G/DL (ref 28.4–34.8)
MCV RBC AUTO: 94.6 FL (ref 82.6–102.9)
MONOCYTES # BLD: 6 % (ref 1–7)
MORPHOLOGY: ABNORMAL
NRBC AUTOMATED: 0 PER 100 WBC
PDW BLD-RTO: 15.7 % (ref 11.8–14.4)
PLATELET # BLD: 97 K/UL (ref 138–453)
PMV BLD AUTO: 11.3 FL (ref 8.1–13.5)
POTASSIUM SERPL-SCNC: 3.6 MMOL/L (ref 3.7–5.3)
RBC # BLD: 4.27 M/UL (ref 3.95–5.11)
SEG NEUTROPHILS: 76 % (ref 36–66)
SEGMENTED NEUTROPHILS ABSOLUTE COUNT: 2.13 K/UL (ref 1.8–7.7)
SODIUM BLD-SCNC: 131 MMOL/L (ref 135–144)
WBC # BLD: 2.8 K/UL (ref 3.5–11.3)

## 2022-08-03 PROCEDURE — 36415 COLL VENOUS BLD VENIPUNCTURE: CPT

## 2022-08-03 PROCEDURE — 97530 THERAPEUTIC ACTIVITIES: CPT

## 2022-08-03 PROCEDURE — 80048 BASIC METABOLIC PNL TOTAL CA: CPT

## 2022-08-03 PROCEDURE — 6370000000 HC RX 637 (ALT 250 FOR IP): Performed by: NURSE PRACTITIONER

## 2022-08-03 PROCEDURE — 6370000000 HC RX 637 (ALT 250 FOR IP)

## 2022-08-03 PROCEDURE — 6360000002 HC RX W HCPCS: Performed by: NURSE PRACTITIONER

## 2022-08-03 PROCEDURE — 97116 GAIT TRAINING THERAPY: CPT

## 2022-08-03 PROCEDURE — 1200000000 HC SEMI PRIVATE

## 2022-08-03 PROCEDURE — 85018 HEMOGLOBIN: CPT

## 2022-08-03 PROCEDURE — 74177 CT ABD & PELVIS W/CONTRAST: CPT

## 2022-08-03 PROCEDURE — 6370000000 HC RX 637 (ALT 250 FOR IP): Performed by: STUDENT IN AN ORGANIZED HEALTH CARE EDUCATION/TRAINING PROGRAM

## 2022-08-03 PROCEDURE — 6360000004 HC RX CONTRAST MEDICATION: Performed by: INTERNAL MEDICINE

## 2022-08-03 PROCEDURE — 99232 SBSQ HOSP IP/OBS MODERATE 35: CPT | Performed by: INTERNAL MEDICINE

## 2022-08-03 PROCEDURE — 94640 AIRWAY INHALATION TREATMENT: CPT

## 2022-08-03 PROCEDURE — 6360000002 HC RX W HCPCS

## 2022-08-03 PROCEDURE — 85014 HEMATOCRIT: CPT

## 2022-08-03 PROCEDURE — 6360000004 HC RX CONTRAST MEDICATION: Performed by: STUDENT IN AN ORGANIZED HEALTH CARE EDUCATION/TRAINING PROGRAM

## 2022-08-03 PROCEDURE — 85025 COMPLETE CBC W/AUTO DIFF WBC: CPT

## 2022-08-03 PROCEDURE — 2580000003 HC RX 258: Performed by: STUDENT IN AN ORGANIZED HEALTH CARE EDUCATION/TRAINING PROGRAM

## 2022-08-03 PROCEDURE — 2580000003 HC RX 258

## 2022-08-03 PROCEDURE — 6360000002 HC RX W HCPCS: Performed by: STUDENT IN AN ORGANIZED HEALTH CARE EDUCATION/TRAINING PROGRAM

## 2022-08-03 RX ORDER — OXYCODONE HYDROCHLORIDE 5 MG/1
5 TABLET ORAL ONCE
Status: COMPLETED | OUTPATIENT
Start: 2022-08-03 | End: 2022-08-03

## 2022-08-03 RX ORDER — CLONAZEPAM 0.5 MG/1
0.5 TABLET ORAL EVERY 12 HOURS
Status: DISCONTINUED | OUTPATIENT
Start: 2022-08-03 | End: 2022-08-05 | Stop reason: HOSPADM

## 2022-08-03 RX ORDER — GABAPENTIN 300 MG/1
300 CAPSULE ORAL 3 TIMES DAILY
Status: DISCONTINUED | OUTPATIENT
Start: 2022-08-03 | End: 2022-08-05 | Stop reason: HOSPADM

## 2022-08-03 RX ORDER — POLYETHYLENE GLYCOL 3350 17 G/17G
17 POWDER, FOR SOLUTION ORAL 2 TIMES DAILY
Status: DISCONTINUED | OUTPATIENT
Start: 2022-08-03 | End: 2022-08-04

## 2022-08-03 RX ADMIN — BUSPIRONE HYDROCHLORIDE 10 MG: 10 TABLET ORAL at 08:05

## 2022-08-03 RX ADMIN — MORPHINE SULFATE 2 MG: 2 INJECTION, SOLUTION INTRAMUSCULAR; INTRAVENOUS at 04:00

## 2022-08-03 RX ADMIN — TRAZODONE HYDROCHLORIDE 100 MG: 100 TABLET ORAL at 20:54

## 2022-08-03 RX ADMIN — HEPARIN SODIUM 5000 UNITS: 5000 INJECTION INTRAVENOUS; SUBCUTANEOUS at 22:00

## 2022-08-03 RX ADMIN — METOCLOPRAMIDE 5 MG: 5 TABLET ORAL at 20:51

## 2022-08-03 RX ADMIN — ACETAMINOPHEN 650 MG: 325 TABLET ORAL at 15:32

## 2022-08-03 RX ADMIN — MORPHINE SULFATE 2 MG: 2 INJECTION, SOLUTION INTRAMUSCULAR; INTRAVENOUS at 12:12

## 2022-08-03 RX ADMIN — TETRAHYDROZOLINE HCL 2 DROP: 0.05 SOLUTION/ DROPS OPHTHALMIC at 08:06

## 2022-08-03 RX ADMIN — HEPARIN SODIUM 5000 UNITS: 5000 INJECTION INTRAVENOUS; SUBCUTANEOUS at 15:29

## 2022-08-03 RX ADMIN — ESCITALOPRAM OXALATE 10 MG: 10 TABLET ORAL at 08:05

## 2022-08-03 RX ADMIN — QUETIAPINE FUMARATE 100 MG: 100 TABLET ORAL at 20:50

## 2022-08-03 RX ADMIN — IOHEXOL 50 ML: 240 INJECTION, SOLUTION INTRATHECAL; INTRAVASCULAR; INTRAVENOUS; ORAL at 11:28

## 2022-08-03 RX ADMIN — GABAPENTIN 300 MG: 300 CAPSULE ORAL at 15:29

## 2022-08-03 RX ADMIN — MEROPENEM 1000 MG: 1 INJECTION, POWDER, FOR SOLUTION INTRAVENOUS at 18:06

## 2022-08-03 RX ADMIN — MEROPENEM 1000 MG: 1 INJECTION, POWDER, FOR SOLUTION INTRAVENOUS at 11:07

## 2022-08-03 RX ADMIN — HYDROCODONE BITARTRATE AND ACETAMINOPHEN 1 TABLET: 5; 325 TABLET ORAL at 10:15

## 2022-08-03 RX ADMIN — GUAIFENESIN 200 MG: 200 SOLUTION ORAL at 10:15

## 2022-08-03 RX ADMIN — SODIUM CHLORIDE: 9 INJECTION, SOLUTION INTRAVENOUS at 02:50

## 2022-08-03 RX ADMIN — ANTACID TABLETS 500 MG: 500 TABLET, CHEWABLE ORAL at 11:15

## 2022-08-03 RX ADMIN — METOCLOPRAMIDE 5 MG: 5 TABLET ORAL at 08:05

## 2022-08-03 RX ADMIN — SENNOSIDES 17.2 MG: 8.6 TABLET, COATED ORAL at 20:51

## 2022-08-03 RX ADMIN — TETRAHYDROZOLINE HCL 2 DROP: 0.05 SOLUTION/ DROPS OPHTHALMIC at 21:00

## 2022-08-03 RX ADMIN — DIPHENHYDRAMINE HYDROCHLORIDE 25 MG: 50 INJECTION, SOLUTION INTRAMUSCULAR; INTRAVENOUS at 02:54

## 2022-08-03 RX ADMIN — METOPROLOL SUCCINATE 25 MG: 25 TABLET, FILM COATED, EXTENDED RELEASE ORAL at 08:05

## 2022-08-03 RX ADMIN — GUAIFENESIN 200 MG: 200 SOLUTION ORAL at 15:39

## 2022-08-03 RX ADMIN — POLYETHYLENE GLYCOL 3350 17 G: 17 POWDER, FOR SOLUTION ORAL at 20:54

## 2022-08-03 RX ADMIN — OXYCODONE 5 MG: 5 TABLET ORAL at 18:04

## 2022-08-03 RX ADMIN — METOCLOPRAMIDE 5 MG: 5 TABLET ORAL at 04:00

## 2022-08-03 RX ADMIN — ONDANSETRON 4 MG: 2 INJECTION INTRAMUSCULAR; INTRAVENOUS at 12:12

## 2022-08-03 RX ADMIN — HYDROCODONE BITARTRATE AND ACETAMINOPHEN 1 TABLET: 5; 325 TABLET ORAL at 01:59

## 2022-08-03 RX ADMIN — IOPAMIDOL 75 ML: 755 INJECTION, SOLUTION INTRAVENOUS at 13:35

## 2022-08-03 RX ADMIN — SODIUM CHLORIDE, PRESERVATIVE FREE 10 ML: 5 INJECTION INTRAVENOUS at 08:06

## 2022-08-03 RX ADMIN — DIPHENHYDRAMINE HYDROCHLORIDE 25 MG: 50 INJECTION, SOLUTION INTRAMUSCULAR; INTRAVENOUS at 08:05

## 2022-08-03 RX ADMIN — MORPHINE SULFATE 2 MG: 2 INJECTION, SOLUTION INTRAMUSCULAR; INTRAVENOUS at 08:05

## 2022-08-03 RX ADMIN — BISACODYL 10 MG: 10 SUPPOSITORY RECTAL at 08:06

## 2022-08-03 RX ADMIN — GABAPENTIN 300 MG: 300 CAPSULE ORAL at 20:51

## 2022-08-03 RX ADMIN — BUDESONIDE AND FORMOTEROL FUMARATE DIHYDRATE 2 PUFF: 160; 4.5 AEROSOL RESPIRATORY (INHALATION) at 08:50

## 2022-08-03 RX ADMIN — MEROPENEM 1000 MG: 1 INJECTION, POWDER, FOR SOLUTION INTRAVENOUS at 02:50

## 2022-08-03 RX ADMIN — CLONAZEPAM 0.5 MG: 0.5 TABLET ORAL at 20:51

## 2022-08-03 RX ADMIN — TETRAHYDROZOLINE HCL 2 DROP: 0.05 SOLUTION/ DROPS OPHTHALMIC at 15:30

## 2022-08-03 RX ADMIN — HEPARIN SODIUM 5000 UNITS: 5000 INJECTION INTRAVENOUS; SUBCUTANEOUS at 05:53

## 2022-08-03 RX ADMIN — SODIUM CHLORIDE: 9 INJECTION, SOLUTION INTRAVENOUS at 20:48

## 2022-08-03 RX ADMIN — CLONAZEPAM 0.5 MG: 0.5 TABLET ORAL at 08:05

## 2022-08-03 RX ADMIN — METOCLOPRAMIDE 5 MG: 5 TABLET ORAL at 15:29

## 2022-08-03 RX ADMIN — PANTOPRAZOLE SODIUM 40 MG: 40 TABLET, DELAYED RELEASE ORAL at 06:01

## 2022-08-03 RX ADMIN — BUSPIRONE HYDROCHLORIDE 10 MG: 10 TABLET ORAL at 20:51

## 2022-08-03 RX ADMIN — ATORVASTATIN CALCIUM 20 MG: 20 TABLET, FILM COATED ORAL at 08:05

## 2022-08-03 RX ADMIN — POTASSIUM BICARBONATE 40 MEQ: 782 TABLET, EFFERVESCENT ORAL at 10:15

## 2022-08-03 ASSESSMENT — ENCOUNTER SYMPTOMS
BACK PAIN: 0
ABDOMINAL DISTENTION: 1
DIARRHEA: 0
VOMITING: 0
APNEA: 0
SHORTNESS OF BREATH: 0
COUGH: 0
BLOOD IN STOOL: 0
CHEST TIGHTNESS: 0
RECTAL PAIN: 0
ABDOMINAL PAIN: 1
NAUSEA: 1

## 2022-08-03 ASSESSMENT — PAIN DESCRIPTION - ORIENTATION
ORIENTATION: RIGHT;LEFT;LOWER
ORIENTATION: RIGHT

## 2022-08-03 ASSESSMENT — PAIN - FUNCTIONAL ASSESSMENT
PAIN_FUNCTIONAL_ASSESSMENT: ACTIVITIES ARE NOT PREVENTED
PAIN_FUNCTIONAL_ASSESSMENT: PREVENTS OR INTERFERES SOME ACTIVE ACTIVITIES AND ADLS

## 2022-08-03 ASSESSMENT — PAIN SCALES - GENERAL
PAINLEVEL_OUTOF10: 7
PAINLEVEL_OUTOF10: 8
PAINLEVEL_OUTOF10: 7
PAINLEVEL_OUTOF10: 8
PAINLEVEL_OUTOF10: 8
PAINLEVEL_OUTOF10: 7
PAINLEVEL_OUTOF10: 3
PAINLEVEL_OUTOF10: 7

## 2022-08-03 ASSESSMENT — PAIN DESCRIPTION - ONSET: ONSET: PROGRESSIVE

## 2022-08-03 ASSESSMENT — PAIN DESCRIPTION - LOCATION
LOCATION: ABDOMEN
LOCATION: BACK;RIB CAGE
LOCATION: ABDOMEN

## 2022-08-03 ASSESSMENT — PAIN DESCRIPTION - FREQUENCY
FREQUENCY: INTERMITTENT
FREQUENCY: INTERMITTENT

## 2022-08-03 ASSESSMENT — PAIN DESCRIPTION - DESCRIPTORS
DESCRIPTORS: ACHING;DISCOMFORT
DESCRIPTORS: SHARP
DESCRIPTORS: ACHING
DESCRIPTORS: ACHING;NAGGING
DESCRIPTORS: CRAMPING;PRESSURE
DESCRIPTORS: CRAMPING;SHARP

## 2022-08-03 ASSESSMENT — PAIN DESCRIPTION - PAIN TYPE
TYPE: ACUTE PAIN
TYPE: ACUTE PAIN

## 2022-08-03 NOTE — PROGRESS NOTES
Physical Therapy  Facility/Department: [facility] ONC/MED SURG  Physical Therapy daily treatment note    Name: Tigist Jane  : 1945  MRN: [id number]  Date of Service: 8/3/2022    Discharge Recommendations:  Patient would benefit from continued therapy after discharge   PT Equipment Recommendations  Equipment Needed: Yes  Mobility Devices: Gladies Holes: Rolling      Patient Diagnosis(es): The primary encounter diagnosis was Acute cystitis without hematuria. A diagnosis of Septicemia ([facility] [address] was also pertinent to this visit. Past Medical History:  has a past medical history of Anxiety, Arthritis, Back pain, Bronchitis, Caffeine use, Carpal tunnel syndrome, Cataracts, bilateral, Constipation, COPD (chronic obstructive pulmonary disease) ([facility] [address] Depression, Diarrhea, GERD (gastroesophageal reflux disease), H/O gastric ulcer, Hyperlipidemia, Hypertension, Mumps, MVP (mitral valve prolapse), Recurrent UTI, Sinusitis, Tracheostomy in place [facility]), Ulcerative esophagitis, and Wellness examination. Past Surgical History:  has a past surgical history that includes Hysterectomy; total nephrectomy; Tonsillectomy; Appendectomy; Cystoscopy; Ovary removal; Tubal ligation; rhinoplasty; Carpal tunnel release; Hand surgery; Total hip arthroplasty; eye surgery; Colonoscopy; joint replacement (Right); Gastric fundoplication (N/A, ); hc cath power picc triple (2020); Upper gastrointestinal endoscopy (N/A, 2020); tracheostomy (N/A, 2020); Gastrostomy tube placement (N/A, 2020); tracheostomy (N/A, 2020); Upper gastrointestinal endoscopy (N/A, 2020); Upper gastrointestinal endoscopy (2020); ERCP (2020); ERCP (2020); ERCP (2020); ERCP (2020); Cholecystectomy, laparoscopic (N/A, 2020); Upper gastrointestinal endoscopy (N/A, 2020); Upper gastrointestinal endoscopy (N/A, 2020); Upper gastrointestinal endoscopy (2021);  Gastrostomy tube arrival, agreeable to pt, requesting to amb to bathroom. C/o abd pain throughout up to 8/10        Cognition   Orientation  Overall Orientation Status: Within Functional Limits     Objective      Bed mobility  Rolling to Right: Minimal assistance  Supine to Sit: Minimal assistance  Sit to Supine: Moderate assistance (MOD A for B LE support, due to increased pain with mobility)  Scooting: Stand by assistance  Transfers  Sit to Stand: Minimal Assistance  Stand to sit: Minimal Assistance  Bed to Chair: Unable to assess (pt requesting return to bed due to pain)  Comment: increased time to complete due to increaesd pain with mobility  Ambulation  Surface: level tile  Device: Rolling Walker  Assistance: Contact guard assistance  Quality of Gait: very slow raven, small, choppy steps  Distance: 10 ft x 2  Comments: seated rest break inbtwn, increased pain and effort noted with mobility     Balance  Posture: Good  Sitting - Static: Good  Sitting - Dynamic: Good;-  Standing - Static: Fair  Standing - Dynamic: Fair (Pt stood 2 trials x 1-2 min each, limited by pain and weakness)  Comments: RW for UE support  Exercise   Pt deferred due to significant increased pain after amb  AM-PAC Score  AM-PAC Inpatient Mobility Raw Score : 16 (08/03/22 1509)  AM-PAC Inpatient T-Scale Score : 40.78 (08/03/22 1509)  Mobility Inpatient CMS 0-100% Score: 54.16 (08/03/22 1509)  Mobility Inpatient CMS G-Code Modifier : CK (08/03/22 1509)     Goals  Short Term Goals  Time Frame for Short term goals: 14 visits  Short term goal 1: Supine to/from sit with SBA. Short term goal 2: Sit to/from stand with SBA. Short term goal 3: Pivot chair to bed CGA.      Therapy Time   Individual Concurrent Group Co-treatment   Time In 6231         Time Out 1052         Minutes 34         Timed Code Treatment Minutes: [address], PTA

## 2022-08-03 NOTE — PROGRESS NOTES
[facility] - Progress Note  Today's Date and Time: 8/3/2022, 10:44 AM    Impression :   Recurrent UTI  BENEDICT  HTN  COPD  Constipation  Leucopenia    Recommendations:   Continue with meropenem for now until definitive culture back    Medical Decision Making/Summary/Discussion:8/3/2022     Empiric therapy for ESBL with meropenem until definitive urine culture is back     Infection Control Recommendations   [place] Precautions  Contact Isolation     Antimicrobial Stewardship Recommendations     Simplification of therapy  Targeted therapy    Coordination of Outpatient Care:   Estimated Length of IV antimicrobials:TBD  Patient will need Midline Catheter Insertion: No  Patient will need PICC line Insertion:No  Patient will need: Home IV , [place],  SNF,  LTAC: TBD  Patient will need outpatient wound care:No    Chief complaint/reason for consultation:   Urinary tract infection, concern for ESBL      History of Present Illness:   Magui Bills is a 68y.o.-year-old  female who was initially admitted on 7/31/2022. Patient seen at the request of Dr. Kiana Lopez. INITIAL HISTORY:      Patient has a history of recurrent UTI, isolated left kidney and COPD. She was admitted with lower abdominal pain and increased urinary frequency, urgency and dysuria. Patient admits to worsening lower abdominal pain and difficulty urinating for the last 4 days and has been worsening since then. Patient has a fever spike of 101 on admission and blood pressure of 140/80. Labs showed no leukocytosis  UA his trace leukocyte Estrace, negative nitrates. Growing gram-negative rods greater than 100,000 CFU. CURRENT EVALUATION :8/3/2022  Afebrile  VSS  On room air     Urinary symptoms improving. Urine is straw yellow this morning. Patient states she still has severed abdominal discomfort of right upper quadrant.  Patient states that she remains nauseous but has not vomited in several days. Patient has persistent cough without sputum. CT of abdomen and pelvis 8-3-22 with retained stool throughout. Absent Rt kidney. No abscess. WBC: 2.8. Will cinthya to monitor. Meropenem can be a potential cause. Labs, X rays reviewed: 8/3/2022    BUN: 17-> 16--> 10  Cr: 1.03-> 0.95.--> 0.85    WBC: 6.0-> 3.9--> 2.8  Hb: 15.1-> 15.0-->12.5  Plat: 127--> 113    Ultrasound kidney:  Tiny 1.2 cm lower pole left renal cortical cyst.   No overt left-sided hydronephrosis. Left ureteral jet is visualized. Prior right nephrectomy. Cultures:  Urine:   7-31-22: Klebsiella pneumoniae greater than 100,000 CFU. ESBL +      Blood: No growth x2    Sputum :    Wound:      Discussed with patient, RN, IM. I have personally reviewed the past medical history, past surgical history, medications, social history, and family history, and I have updated the database accordingly.   Past Medical History:     Past Medical History:   Diagnosis Date    Anxiety     Arthritis     Back pain     Bronchitis     Caffeine use     8 coffee / day    Carpal tunnel syndrome     Cataracts, bilateral     Constipation     COPD (chronic obstructive pulmonary disease) (HCC)     Emphysema    Depression     Diarrhea     GERD (gastroesophageal reflux disease)     H/O gastric ulcer     Hyperlipidemia     Hypertension     Mumps     MVP (mitral valve prolapse)     Dr. Ferny Hernandez in May 2019    Recurrent UTI     Dr. Yumi Smith    Sinusitis     Tracheostomy in place [facility])     Ulcerative esophagitis     Wellness examination     Dr. Sandy Valladares seen in Jan 2020       Past Surgical  History:     Past Surgical History:   Procedure Laterality Date    [facility], LAPAROSCOPIC N/A 05/22/2020    XI LAPAROSCOPIC ROBOTIC CHOLECYSTECTOMY, PYLOROPLASTY performed by Richard Freitas MD at [address] [place] [address] N/A 1/28/2022    COLONOSCOPY POLYPECTOMY HOT performed by Olesya Pablo MD at [address] [place] [address] CYSTOSCOPY      ERCP  05/21/2020    with stent insertion, balloon dilation, sphinctereotomy    ERCP  05/21/2020    ** CASE IN OR WITY GI STAFF** ERCP STENT INSERTION performed by Juan Levin MD at [facility]    ERCP  05/21/2020    ** CASE IN OR WITH GI STAFF**ERCP SPHINCTER/PAPILLOTOMY performed by Juan Levin MD at [facility]    ERCP  05/21/2020    ** CASE IN OR WITH GI STAFF**ERCP DILATION BALLOON performed by Juan Levin MD at [facility]    ERCP N/A 2/8/2021    ERCP STENT REMOVAL performed by Jessica Rodriguez MD at [facility]    ERCP  2/8/2021    ERCP STONE REMOVAL performed by Jessica Rodriguez MD at [address] [facility]    GASTRIC FUNDOPLICATION N/A 49/42/0590    XI LAPAROSCOPIC ROBOTIC [facility], CHERYL FUNDOPLICATION performed by Tracie Wooten MD at [address] 03/27/2020    EGD PEG TUBE PLACEMENT performed by Tracie Wooten MD at [address] N/A 2/8/2021    **CASE IN O.R. W/ GI STAFF - EGD WITH REMOVAL PEG TUBE performed by Jessica Rodriguez MD at [facility]      [facility] CATH POWER PICC TRIPLE  03/04/2020         HERNIA REPAIR      Hiatal hernia    HYSTERECTOMY (CERVIX STATUS UNKNOWN)      Abdominal    IR NONTUNNELED VASCULAR CATHETER  5/10/2022    IR NONTUNNELED VASCULAR CATHETER 5/10/2022 Anupama Brandt MD [facility]    JOINT REPLACEMENT Right     right hip    OVARY REMOVAL      RHINOPLASTY      TONSILLECTOMY      TOTAL HIP ARTHROPLASTY      TOTAL NEPHRECTOMY      atrophic from infections, age 13    TRACHEOSTOMY N/A 03/27/2020    **ADD ON **WANTS 10:00AM **TRACHEOTOMY performed by Tracie Wooten MD at [facility] N/A 04/02/2020    TRACHEOTOMY EXCHANGE performed by Tracie Wooten MD at [facility] 03/17/2020    BEDSIDE EGD ESOPHAGOGASTRODUODENOSCOPY (ICU) performed by Tracie Wooten MD at [facility]    Alcohol use: No    Drug use: No    Sexual activity: Never   Other Topics Concern    Not on file   Social History Narrative    Not on file     Social Determinants of Health     Financial Resource Strain: Not on file   Food Insecurity: Not on file   Transportation Needs: Not on file   Physical Activity: Not on file   Stress: Not on file   Social Connections: Not on file   Intimate Partner Violence: Not on file   Housing Stability: Not on file       Family History:     Family History   Problem Relation Age of Onset    Cancer Mother         uterine    Heart Attack Mother     Heart Attack Father     Other Maternal Grandfather         foot gangrene    Other Paternal Grandmother         bleeding ulcers    Other Paternal Grandfather         lung cancer        Allergies:   Prednisone, Compazine [prochlorperazine maleate], Penicillin v potassium, and Penicillins     Review of Systems:   Constitutional: No fevers or chills. No systemic complaints  Head: No headaches  Eyes: No double vision or blurry vision. No conjunctival inflammation. ENT: No sore throat or runny nose. . No hearing loss, tinnitus or vertigo. Cardiovascular: No chest pain or palpitations. No shortness of breath. No HOLLAND  Lung: No shortness of breath or cough. No sputum production  Abdomen: No nausea, vomiting, diarrhea,. Upper abdominal pain. Genitourinary: No increased urinary frequency, or dysuria. No hematuria. No suprapubic or CVA pain  Musculoskeletal: No muscle aches or pains. No joint effusions, swelling or deformities  Hematologic: No bleeding or bruising. Neurologic: No headache, weakness, numbness, or tingling. Integument: No rash, no ulcers. Psychiatric: No depression. Endocrine: No polyuria, no polydipsia, no polyphagia.     Physical Examination :   Patient Vitals for the past 8 hrs:   BP Temp Temp src Pulse Resp SpO2   08/03/22 1015 -- -- -- -- 16 --   08/03/22 0835 -- -- -- -- 16 --   08/03/22 0745 118/73 98.5 °F (36.9 °C) Oral 78 18 90 %   08/03/22 0430 -- -- -- -- 18 --     General Appearance: Awake, alert, and in no apparent distress  Head:  Normocephalic, no trauma  Eyes: Pupils equal, round, reactive to light and accommodation; extraocular movements intact; sclera anicteric; conjunctivae pink. No embolic phenomena. ENT: Oropharynx clear, without erythema, exudate, or thrush. No tenderness of sinuses. Mouth/throat: mucosa pink and moist. No lesions. Dentition in good repair. Neck:Supple, without lymphadenopathy. Thyroid normal, No bruits. Pulmonary/Chest: Clear to auscultation, without wheezes, rales, or rhonchi. No dullness to percussion. Cardiovascular: Regular rate and rhythm without murmurs, rubs, or gallops. Abdomen: Tender to touch RUQ. Bowel sounds normal. No organomegaly  All four Extremities: No cyanosis, clubbing, edema, or effusions. Neurologic: No gross sensory or motor deficits. Skin: Warm and dry with good turgor. No signs of peripheral arterial or venous insufficiency. No ulcerations. No open wounds. Medical Decision Making -Laboratory:   I have independently reviewed/ordered the following labs:    CBC with Differential:   Recent Labs     08/02/22  0641 08/02/22  1355 08/02/22  2216 08/03/22  0354   WBC 4.7  --   --  2.8*   HGB 13.0   < > 13.5 12.5   HCT 40.6   < > 40.4 40.4   PLT See Reflexed IPF Result  --   --  97*   LYMPHOPCT 16*  --   --  15*   MONOPCT 11  --   --  6    < > = values in this interval not displayed. BMP:   Recent Labs     07/31/22  1444 08/01/22  0536 08/02/22  0641 08/03/22  0354   *   < > 134* 131*   K 4.8   < > 4.0 3.6*   CL 95*   < > 100 101   CO2 23   < > 21 21   BUN 17   < > 12 10   CREATININE 1.03*   < > 0.86 0.85   MG 1.7  --   --   --     < > = values in this interval not displayed.      Hepatic Function Panel:   Recent Labs     07/31/22  1444   PROT 7.3   LABALBU 4.3   BILITOT 0.27*   ALKPHOS 131*   ALT 37*   AST 50*     No results for input(s): RPR in the last 72 hours. No results for input(s): HIV in the last 72 hours. No results for input(s): BC in the last 72 hours. Lab Results   Component Value Date/Time    MUCUS NOT REPORTED 12/18/2021 12:26 PM    RBC 4.27 08/03/2022 03:54 AM    TRICHOMONAS NOT REPORTED 12/18/2021 12:26 PM    WBC 2.8 08/03/2022 03:54 AM    YEAST NOT REPORTED 12/18/2021 12:26 PM    TURBIDITY Clear 07/31/2022 03:53 PM     Lab Results   Component Value Date/Time    CREATININE 0.85 08/03/2022 03:54 AM    GLUCOSE 87 08/03/2022 03:54 AM       Medical Decision Making-Imaging:       Medical Decision [id number]:       Contains abnormal data Culture, Urine  Order: [id number]  Status: Final result    Visible to patient: No (not released)    Next appt: None    Specimen Information: Urine, straight catheter        0 Result Notes    Component 7/31/22 2184    Specimen Description . URINE,STRAIGHT CATHETER    Culture  Abnormal   KLEBSIELLA PNEUMONIAE >[id number] CFU/ML This organism is an Extended Spectrum Beta Lactamase (ESBL)  and resistance to therapy with Penicillins, Cephalosporins and Aztreonam is expected. These organisms generally remain susceptible to Carbapenems. Consider ID Consultation. CONTACT PRECAUTIONS INDICATED. Genevievekaye Anthonykendra [address]              Specimen Collected: 07/31/22 15:53 EDT Last Resulted: 08/03/22 00:27 EDT             Medical Decision Making-Other:     Note:  Labs, medications, radiologic studies were reviewed with personal review of films  Moderate Large amounts of data were reviewed  Discussed with nursing Staff, Discharge planner  Infection Control and Prevention measures reviewed  All prior entries were reviewed  Administer medications as ordered  Prognosis: Good  Discharge planning reviewed  Follow up as outpatient. Thank you for allowing us to participate in the care of this patient. Please call with questions.     Verenice Mina MD  Internal Medicine Resident, PGY-2  [facility] Miriam:    I have discussed the case, including pertinent history and exam findings with the residents and students. I have seen and examined the patient and the key elements of the encounter have been performed by me. I was present when the student obtained his information or examined the patient. I have reviewed the laboratory data, other diagnostic studies and discussed them with the residents. I have updated the medical record where necessary. I agree with the assessment, plan and orders as documented by the resident/ student.     Ed Murray MD.    1/4/2517,04:98 AM

## 2022-08-03 NOTE — PROGRESS NOTES
[facility]  Internal Medicine Teaching Residency Program  Inpatient Daily Progress Note  ______________________________________________________________________________    Patient: Patrizia Vargas  YOB: 1945   AYS:[id number]    Acct: [de-identified]     Room: [facility]  Admit date: 7/31/2022  Today's date: 08/03/22  Number of days in the hospital: 3    SUBJECTIVE   Admitting Diagnosis: UTI (urinary tract infection)  CC: Abdominal Pain. Pt examined at bedside. Chart & results reviewed. Patient complains of having abdominal pain or right mid and lower quadrant. Pain is associated with nausea only. Her diet was changed to solids and she is tolerating it really well. Enema was given to patient yesterday and she had a bowel movement. Patient also reports of having dysuria. Patient denies any other symptom. On examination her chest is clear, hear sounds are normal. Abdomen is soft, distended and mildly tender at the right side. Plan for today:  Abdominal CT stat, awaiting results. Recommendations from ID for antibiotics. Incentive spirometer. Bolus IV fluid. Repeat Enema. Restart Gabapentin   Zofran for Nausea. Review of Systems   Constitutional:  Negative for chills and fever. HENT:  Negative for congestion, ear discharge and ear pain. Respiratory:  Negative for apnea, cough, chest tightness and shortness of breath. Cardiovascular:  Negative for chest pain and palpitations. Gastrointestinal:  Positive for abdominal distention, abdominal pain and nausea. Negative for blood in stool, diarrhea, rectal pain and vomiting. Endocrine: Negative for cold intolerance and heat intolerance. Genitourinary:  Positive for dysuria. Negative for frequency, hematuria and pelvic pain. Musculoskeletal:  Negative for arthralgias, back pain, joint swelling and myalgias. Skin:  Negative for pallor and rash.    Neurological:  Negative for dizziness, tremors, light-headedness, numbness and headaches. BRIEF HISTORY   Patient, 68years old female, arrived to emergency department from care facility. Patient presented to emergency department with a complains of abdominal pain for 1 day. It was associated with high grade fever. Patient reports of having frequent UTIs in the past.  Patient has history of single kidney and sepsis secondary to her UTI. Her urine analysis was done which was concerning for UTI. She was admitted and was treated for underlying infection. Her urine culture showed klebsiella ,ESBL. ID was consulted and patient was put on meropenem upon their recommendations. Patient had an episode of hematemesis but her hemoglobin has been stable. She is on Protonix 40 mg Oral daily. OBJECTIVE     Vital Signs:  /73   Pulse 78   Temp 98.5 °F (36.9 °C) (Oral)   Resp 16   Ht 5' 2\" (1.575 m)   Wt 179 lb (81.2 kg)   SpO2 90%   BMI 32.74 kg/m²     Temp (24hrs), Av °F (37.2 °C), Min:98.5 °F (36.9 °C), Max:99.5 °F (37.5 °C)    In: 2113.3   Out: 1450 [Urine:1450]    Physical Exam:  Physical Exam  Constitutional:       Appearance: Normal appearance. HENT:      Head: Normocephalic. Cardiovascular:      Rate and Rhythm: Normal rate and regular rhythm. Pulses: Normal pulses. Heart sounds: Normal heart sounds. Pulmonary:      Breath sounds: Normal breath sounds. No stridor. No wheezing or rhonchi. Abdominal:      General: There is distension. Palpations: Abdomen is soft. There is no mass. Tenderness: There is abdominal tenderness. There is no guarding. Skin:     General: Skin is dry. Coloration: Skin is not jaundiced. Findings: No bruising or lesion. Neurological:      General: No focal deficit present. Mental Status: She is alert and oriented to person, place, and time.          Medications:  Scheduled Medications:    polyethylene glycol  17 g Oral BID    gabapentin  300 mg Oral TID pantoprazole  40 mg Oral QAM AC    tetrahydrozoline  2 drop Both Eyes TID    meropenem  1,000 mg IntraVENous Q8H    bisacodyl  10 mg Rectal BID    alteplase  1 mg IntraCATHeter Once    QUEtiapine  100 mg Oral Nightly    traZODone  100 mg Oral Nightly    budesonide-formoterol  2 puff Inhalation BID    busPIRone  10 mg Oral BID    escitalopram  10 mg Oral Daily    metoclopramide  5 mg Oral 4x Daily    atorvastatin  20 mg Oral Daily    sodium chloride flush  5-40 mL IntraVENous 2 times per day    heparin (porcine)  5,000 Units SubCUTAneous 3 times per day    metoprolol succinate  25 mg Oral Daily    senna  2 tablet Oral Nightly     Continuous Infusions:    sodium chloride      sodium chloride 75 mL/hr at 08/03/22 0605     PRN Medicationscalcium carbonate, 500 mg, TID PRN  morphine, 2 mg, Q4H PRN  diphenhydrAMINE, 25 mg, Q6H PRN  albuterol, 2.5 mg, Q6H PRN  clonazePAM, 0.5 mg, BID PRN  sodium chloride flush, 5-40 mL, PRN  sodium chloride, , PRN  ondansetron, 4 mg, Q8H PRN   Or  ondansetron, 4 mg, Q6H PRN  acetaminophen, 650 mg, Q6H PRN   Or  acetaminophen, 650 mg, Q6H PRN  hydrALAZINE, 10 mg, Q6H PRN  guaiFENesin, 200 mg, Q4H PRN  HYDROcodone 5 mg - acetaminophen, 1 tablet, Q6H PRN        Diagnostic Labs:  CBC:   Recent Labs     08/01/22  0536 08/01/22  0758 08/02/22  0641 08/02/22  1355 08/02/22  2216 08/03/22  0354   WBC 3.9  --  4.7  --   --  2.8*   RBC 4.99  --  4.40  --   --  4.27   HGB 14.9   < > 13.0 12.6 13.5 12.5   HCT 46.7   < > 40.6 39.6 40.4 40.4   MCV 93.6  --  92.3  --   --  94.6   RDW 15.7*  --  15.8*  --   --  15.7*   PLT See Reflexed IPF Result  --  See Reflexed IPF Result  --   --  97*    < > = values in this interval not displayed. BMP:   Recent Labs     08/01/22  0536 08/02/22  0641 08/03/22  0354    134* 131*   K 4.3 4.0 3.6*    100 101   CO2 24 21 21   BUN 16 12 10   CREATININE 0.95* 0.86 0.85     BNP: No results for input(s): BNP in the last 72 hours.   PT/INR: No results for input(s): PROTIME, INR in the last 72 hours. APTT: No results for input(s): APTT in the last 72 hours. CARDIAC ENZYMES: No results for input(s): CKMB, CKMBINDEX, TROPONINI in the last 72 hours. Invalid input(s): CKTOTAL;3  FASTING LIPID PANEL:  Lab Results   Component Value Date    CHOL 203 (H) 12/14/2020    HDL 43 12/14/2020    TRIG 268 (H) 12/14/2020     LIVER PROFILE:   Recent Labs     07/31/22  1444   AST 50*   ALT 37*   BILITOT 0.27*   ALKPHOS 131*      MICROBIOLOGY:   Lab Results   Component Value Date/Time    CULTURE (A) 07/31/2022 03:53 PM     KLEBSIELLA PNEUMONIAE >619603 CFU/ML This organism is an Extended Spectrum Beta Lactamase (ESBL)  and resistance to therapy with Penicillins, Cephalosporins and Aztreonam is expected. These organisms generally remain susceptible to Carbapenems. Consider ID Consultation. CONTACT PRECAUTIONS INDICATED. Imaging:    CT ABDOMEN PELVIS WO CONTRAST Additional Contrast? None    Result Date: 7/31/2022  1. No acute findings in the abdomen or pelvis. 2. Trace bilateral pleural effusions and trace pericardial effusion. 3. Incidental findings as above for which no dedicated follow-up is recommended. XR ABDOMEN (KUB) (SINGLE AP VIEW)    Result Date: 7/31/2022  Gaseous dilatation of the stomach. Patient may benefit from an NG tube. Moderate constipation of the colon without a large focal bolus in the rectum. The small bowel and colon are not dilated. CT HEAD WO CONTRAST    Result Date: 7/31/2022  No acute intracranial abnormality. US RENAL COMPLETE    Result Date: 8/1/2022  1. Tiny 1.2 cm lower pole left renal cortical cyst. 2. No overt left-sided hydronephrosis. Left ureteral jet is visualized. 3. Prior right nephrectomy. XR CHEST PORTABLE    Result Date: 7/31/2022  No acute process.        ASSESSMENT & PLAN     ASSESSMENT / PLAN:      Principal Problem:    UTI (urinary tract infection)  Plan: Urine Culture : Klebsiella  Blood Culture : no growth  Repeat blood culture today. Renal ultrasound : Normal  Lactic acid - normal  On Meropenem , await recommendations from ID. Active Problems:    ESBL (extended spectrum beta-lactamase) producing bacteria infection  Plan: Continue Meropenem , await recommendations from ID for antibiotics. Complicated UTI (urinary tract infection)      Depression  Plan: Controlled. - Will continue Lexapro 10 mg daily. Anxiety  Plan: - Controlled. - Will continue Buspirone 10 mg twice daily    BENEDICT (acute kidney injury)   Plan: -  Will monitor I/Os. - Will avoid nephrotoxic drugs. Essential hypertension  Plan: - Controlled. - Will continue Toprol 25 mg.  - Will continue hydralazine 10 PRN      Tobacco abuse  Plan: Quit smoking. Coffee ground emesis  Plan: Controlled              Diet: Adult diet   DVT ppx : Heparin   GI ppx: Protonix     PT/OT/SW: Consulted. Will follow up  Discharge Planning:  consulted. Will follow up    Sandy Donnelly M.D. Internal Medicine Resident PGY-1  [address]  12:48 PM 8/3/2022     Please note that part of this chart was generated using voice recognition dictation software. Although every effort was made to ensure the accuracy of this automated transcription, some errors in transcription may have occurred.

## 2022-08-04 LAB
ABSOLUTE EOS #: 0.2 K/UL (ref 0–0.4)
ABSOLUTE IMMATURE GRANULOCYTE: 0.06 K/UL (ref 0–0.3)
ABSOLUTE LYMPH #: 0.61 K/UL (ref 1–4.8)
ABSOLUTE MONO #: 0.38 K/UL (ref 0.1–0.8)
ANION GAP SERPL CALCULATED.3IONS-SCNC: 10 MMOL/L (ref 9–17)
BASOPHILS # BLD: 0 % (ref 0–2)
BASOPHILS ABSOLUTE: 0 K/UL (ref 0–0.2)
BUN BLDV-MCNC: 9 MG/DL (ref 8–23)
CALCIUM SERPL-MCNC: 8.9 MG/DL (ref 8.6–10.4)
CHLORIDE BLD-SCNC: 101 MMOL/L (ref 98–107)
CO2: 24 MMOL/L (ref 20–31)
CREAT SERPL-MCNC: 0.82 MG/DL (ref 0.5–0.9)
CULTURE: ABNORMAL
EOSINOPHILS RELATIVE PERCENT: 7 % (ref 1–4)
GFR AFRICAN AMERICAN: >60 ML/MIN
GFR NON-AFRICAN AMERICAN: >60 ML/MIN
GFR SERPL CREATININE-BSD FRML MDRD: NORMAL ML/MIN/{1.73_M2}
GLUCOSE BLD-MCNC: 93 MG/DL (ref 70–99)
HCT VFR BLD CALC: 39.8 % (ref 36.3–47.1)
HCT VFR BLD CALC: 40.9 % (ref 36.3–47.1)
HEMOGLOBIN: 12.9 G/DL (ref 11.9–15.1)
HEMOGLOBIN: 13.1 G/DL (ref 11.9–15.1)
IMMATURE GRANULOCYTES: 2 %
LYMPHOCYTES # BLD: 21 % (ref 24–44)
MCH RBC QN AUTO: 28.9 PG (ref 25.2–33.5)
MCHC RBC AUTO-ENTMCNC: 31.5 G/DL (ref 28.4–34.8)
MCV RBC AUTO: 91.5 FL (ref 82.6–102.9)
MONOCYTES # BLD: 13 % (ref 1–7)
MORPHOLOGY: ABNORMAL
NRBC AUTOMATED: 0 PER 100 WBC
PDW BLD-RTO: 15.6 % (ref 11.8–14.4)
PLATELET # BLD: ABNORMAL K/UL (ref 138–453)
PLATELET, FLUORESCENCE: 120 K/UL (ref 138–453)
PLATELET, IMMATURE FRACTION: 4.6 % (ref 1.1–10.3)
POTASSIUM SERPL-SCNC: 4.2 MMOL/L (ref 3.7–5.3)
RBC # BLD: 4.47 M/UL (ref 3.95–5.11)
SEG NEUTROPHILS: 57 % (ref 36–66)
SEGMENTED NEUTROPHILS ABSOLUTE COUNT: 1.65 K/UL (ref 1.8–7.7)
SODIUM BLD-SCNC: 135 MMOL/L (ref 135–144)
SPECIMEN DESCRIPTION: ABNORMAL
WBC # BLD: 2.9 K/UL (ref 3.5–11.3)

## 2022-08-04 PROCEDURE — 2700000000 HC OXYGEN THERAPY PER DAY

## 2022-08-04 PROCEDURE — 6370000000 HC RX 637 (ALT 250 FOR IP)

## 2022-08-04 PROCEDURE — 6360000002 HC RX W HCPCS: Performed by: STUDENT IN AN ORGANIZED HEALTH CARE EDUCATION/TRAINING PROGRAM

## 2022-08-04 PROCEDURE — 97530 THERAPEUTIC ACTIVITIES: CPT

## 2022-08-04 PROCEDURE — 94640 AIRWAY INHALATION TREATMENT: CPT

## 2022-08-04 PROCEDURE — 80048 BASIC METABOLIC PNL TOTAL CA: CPT

## 2022-08-04 PROCEDURE — 85018 HEMOGLOBIN: CPT

## 2022-08-04 PROCEDURE — 2580000003 HC RX 258: Performed by: STUDENT IN AN ORGANIZED HEALTH CARE EDUCATION/TRAINING PROGRAM

## 2022-08-04 PROCEDURE — 85025 COMPLETE CBC W/AUTO DIFF WBC: CPT

## 2022-08-04 PROCEDURE — 99232 SBSQ HOSP IP/OBS MODERATE 35: CPT | Performed by: INTERNAL MEDICINE

## 2022-08-04 PROCEDURE — 6370000000 HC RX 637 (ALT 250 FOR IP): Performed by: NURSE PRACTITIONER

## 2022-08-04 PROCEDURE — 36415 COLL VENOUS BLD VENIPUNCTURE: CPT

## 2022-08-04 PROCEDURE — 6360000002 HC RX W HCPCS

## 2022-08-04 PROCEDURE — 85014 HEMATOCRIT: CPT

## 2022-08-04 PROCEDURE — 85055 RETICULATED PLATELET ASSAY: CPT

## 2022-08-04 PROCEDURE — 6370000000 HC RX 637 (ALT 250 FOR IP): Performed by: STUDENT IN AN ORGANIZED HEALTH CARE EDUCATION/TRAINING PROGRAM

## 2022-08-04 PROCEDURE — 1200000000 HC SEMI PRIVATE

## 2022-08-04 PROCEDURE — 97535 SELF CARE MNGMENT TRAINING: CPT

## 2022-08-04 RX ORDER — ENOXAPARIN SODIUM 100 MG/ML
40 INJECTION SUBCUTANEOUS DAILY
Status: DISCONTINUED | OUTPATIENT
Start: 2022-08-04 | End: 2022-08-05 | Stop reason: HOSPADM

## 2022-08-04 RX ORDER — POLYETHYLENE GLYCOL 3350 17 G/17G
17 POWDER, FOR SOLUTION ORAL 4 TIMES DAILY
Status: DISCONTINUED | OUTPATIENT
Start: 2022-08-04 | End: 2022-08-05 | Stop reason: HOSPADM

## 2022-08-04 RX ADMIN — TETRAHYDROZOLINE HCL 2 DROP: 0.05 SOLUTION/ DROPS OPHTHALMIC at 08:49

## 2022-08-04 RX ADMIN — BISACODYL 10 MG: 10 SUPPOSITORY RECTAL at 08:47

## 2022-08-04 RX ADMIN — GUAIFENESIN 200 MG: 200 SOLUTION ORAL at 15:04

## 2022-08-04 RX ADMIN — CLONAZEPAM 0.5 MG: 0.5 TABLET ORAL at 20:21

## 2022-08-04 RX ADMIN — ESCITALOPRAM OXALATE 10 MG: 10 TABLET ORAL at 08:48

## 2022-08-04 RX ADMIN — POLYETHYLENE GLYCOL 3350 17 G: 17 POWDER, FOR SOLUTION ORAL at 08:47

## 2022-08-04 RX ADMIN — GABAPENTIN 300 MG: 300 CAPSULE ORAL at 20:30

## 2022-08-04 RX ADMIN — ACETAMINOPHEN 650 MG: 325 TABLET ORAL at 15:04

## 2022-08-04 RX ADMIN — BUSPIRONE HYDROCHLORIDE 10 MG: 10 TABLET ORAL at 20:21

## 2022-08-04 RX ADMIN — MEROPENEM 1000 MG: 1 INJECTION, POWDER, FOR SOLUTION INTRAVENOUS at 02:54

## 2022-08-04 RX ADMIN — METOCLOPRAMIDE 5 MG: 5 TABLET ORAL at 15:04

## 2022-08-04 RX ADMIN — METOCLOPRAMIDE 5 MG: 5 TABLET ORAL at 08:48

## 2022-08-04 RX ADMIN — METOCLOPRAMIDE 5 MG: 5 TABLET ORAL at 05:17

## 2022-08-04 RX ADMIN — POLYETHYLENE GLYCOL 3350 17 G: 17 POWDER, FOR SOLUTION ORAL at 20:21

## 2022-08-04 RX ADMIN — BUSPIRONE HYDROCHLORIDE 10 MG: 10 TABLET ORAL at 08:48

## 2022-08-04 RX ADMIN — PANTOPRAZOLE SODIUM 40 MG: 40 TABLET, DELAYED RELEASE ORAL at 05:17

## 2022-08-04 RX ADMIN — ATORVASTATIN CALCIUM 20 MG: 20 TABLET, FILM COATED ORAL at 08:48

## 2022-08-04 RX ADMIN — TRAZODONE HYDROCHLORIDE 100 MG: 100 TABLET ORAL at 20:21

## 2022-08-04 RX ADMIN — METOPROLOL SUCCINATE 25 MG: 25 TABLET, FILM COATED, EXTENDED RELEASE ORAL at 08:48

## 2022-08-04 RX ADMIN — ONDANSETRON 4 MG: 2 INJECTION INTRAMUSCULAR; INTRAVENOUS at 18:14

## 2022-08-04 RX ADMIN — CLONAZEPAM 0.5 MG: 0.5 TABLET ORAL at 08:48

## 2022-08-04 RX ADMIN — GUAIFENESIN 200 MG: 200 SOLUTION ORAL at 08:47

## 2022-08-04 RX ADMIN — ENOXAPARIN SODIUM 40 MG: 100 INJECTION SUBCUTANEOUS at 15:04

## 2022-08-04 RX ADMIN — TETRAHYDROZOLINE HCL 2 DROP: 0.05 SOLUTION/ DROPS OPHTHALMIC at 15:05

## 2022-08-04 RX ADMIN — SENNOSIDES 17.2 MG: 8.6 TABLET, COATED ORAL at 20:21

## 2022-08-04 RX ADMIN — BUDESONIDE AND FORMOTEROL FUMARATE DIHYDRATE 2 PUFF: 160; 4.5 AEROSOL RESPIRATORY (INHALATION) at 09:05

## 2022-08-04 RX ADMIN — POLYETHYLENE GLYCOL 3350 17 G: 17 POWDER, FOR SOLUTION ORAL at 15:04

## 2022-08-04 RX ADMIN — GABAPENTIN 300 MG: 300 CAPSULE ORAL at 08:48

## 2022-08-04 RX ADMIN — TETRAHYDROZOLINE HCL 2 DROP: 0.05 SOLUTION/ DROPS OPHTHALMIC at 20:29

## 2022-08-04 RX ADMIN — HEPARIN SODIUM 5000 UNITS: 5000 INJECTION INTRAVENOUS; SUBCUTANEOUS at 05:17

## 2022-08-04 RX ADMIN — BISACODYL 10 MG: 10 SUPPOSITORY RECTAL at 20:21

## 2022-08-04 RX ADMIN — METOCLOPRAMIDE 5 MG: 5 TABLET ORAL at 20:28

## 2022-08-04 RX ADMIN — ACETAMINOPHEN 650 MG: 325 TABLET ORAL at 08:48

## 2022-08-04 RX ADMIN — MEROPENEM 1000 MG: 1 INJECTION, POWDER, FOR SOLUTION INTRAVENOUS at 18:10

## 2022-08-04 RX ADMIN — MEROPENEM 1000 MG: 1 INJECTION, POWDER, FOR SOLUTION INTRAVENOUS at 10:49

## 2022-08-04 RX ADMIN — QUETIAPINE FUMARATE 100 MG: 100 TABLET ORAL at 20:21

## 2022-08-04 RX ADMIN — ACETAMINOPHEN 650 MG: 325 TABLET ORAL at 20:56

## 2022-08-04 RX ADMIN — GABAPENTIN 300 MG: 300 CAPSULE ORAL at 15:04

## 2022-08-04 ASSESSMENT — PAIN DESCRIPTION - DESCRIPTORS
DESCRIPTORS: ACHING
DESCRIPTORS: ACHING
DESCRIPTORS: SHARP
DESCRIPTORS: ACHING

## 2022-08-04 ASSESSMENT — PAIN DESCRIPTION - ONSET: ONSET: ON-GOING

## 2022-08-04 ASSESSMENT — PAIN - FUNCTIONAL ASSESSMENT
PAIN_FUNCTIONAL_ASSESSMENT: ACTIVITIES ARE NOT PREVENTED
PAIN_FUNCTIONAL_ASSESSMENT: PREVENTS OR INTERFERES SOME ACTIVE ACTIVITIES AND ADLS
PAIN_FUNCTIONAL_ASSESSMENT: PREVENTS OR INTERFERES SOME ACTIVE ACTIVITIES AND ADLS

## 2022-08-04 ASSESSMENT — PAIN SCALES - GENERAL
PAINLEVEL_OUTOF10: 6
PAINLEVEL_OUTOF10: 3
PAINLEVEL_OUTOF10: 2
PAINLEVEL_OUTOF10: 6
PAINLEVEL_OUTOF10: 7
PAINLEVEL_OUTOF10: 2

## 2022-08-04 ASSESSMENT — PAIN DESCRIPTION - ORIENTATION
ORIENTATION: RIGHT

## 2022-08-04 ASSESSMENT — ENCOUNTER SYMPTOMS
ANAL BLEEDING: 0
VOMITING: 0
ABDOMINAL PAIN: 1
NAUSEA: 1
BLOOD IN STOOL: 0
DIARRHEA: 0
ABDOMINAL DISTENTION: 1
CONSTIPATION: 1
EYES NEGATIVE: 1
RESPIRATORY NEGATIVE: 1
RECTAL PAIN: 0

## 2022-08-04 ASSESSMENT — PAIN DESCRIPTION - LOCATION
LOCATION: ABDOMEN

## 2022-08-04 ASSESSMENT — PAIN DESCRIPTION - DIRECTION: RADIATING_TOWARDS: RIBS

## 2022-08-04 ASSESSMENT — PAIN DESCRIPTION - PAIN TYPE: TYPE: ACUTE PAIN

## 2022-08-04 ASSESSMENT — PAIN DESCRIPTION - FREQUENCY: FREQUENCY: INTERMITTENT

## 2022-08-04 NOTE — PROGRESS NOTES
Occupational Therapy  Facility/Department: [facility]  Occupational Daily Treatment Note    Name: Natalia Boyer  : 1945  MRN: [id number]  Date of Service: 2022    Discharge Recommendations:  Patient would benefit from continued therapy after discharge  Patient Diagnosis(es): The primary encounter diagnosis was Acute cystitis without hematuria. A diagnosis of Septicemia ([facility] [address] was also pertinent to this visit. Past Medical History:  has a past medical history of Anxiety, Arthritis, Back pain, Bronchitis, Caffeine use, Carpal tunnel syndrome, Cataracts, bilateral, Constipation, COPD (chronic obstructive pulmonary disease) ([facility] [address] Depression, Diarrhea, GERD (gastroesophageal reflux disease), H/O gastric ulcer, Hyperlipidemia, Hypertension, Mumps, MVP (mitral valve prolapse), Recurrent UTI, Sinusitis, Tracheostomy in place [facility]), Ulcerative esophagitis, and Wellness examination. Past Surgical History:  has a past surgical history that includes Hysterectomy; total nephrectomy; Tonsillectomy; Appendectomy; Cystoscopy; Ovary removal; Tubal ligation; rhinoplasty; Carpal tunnel release; Hand surgery; Total hip arthroplasty; eye surgery; Colonoscopy; joint replacement (Right); Gastric fundoplication (N/A, ); hc cath power picc triple (2020); Upper gastrointestinal endoscopy (N/A, 2020); tracheostomy (N/A, 2020); Gastrostomy tube placement (N/A, 2020); tracheostomy (N/A, 2020); Upper gastrointestinal endoscopy (N/A, 2020); Upper gastrointestinal endoscopy (2020); ERCP (2020); ERCP (2020); ERCP (2020); ERCP (2020); Cholecystectomy, laparoscopic (N/A, 2020); Upper gastrointestinal endoscopy (N/A, 2020); Upper gastrointestinal endoscopy (N/A, 2020); Upper gastrointestinal endoscopy (2021); Gastrostomy tube placement (N/A, 2021); ERCP (N/A, 2021); ERCP (2021);  Upper gastrointestinal endoscopy (N/A, 11/24/2021); hernia repair; Colonoscopy (N/A, 1/28/2022); and IR NONTUNNELED VASCULAR CATHETER > 5 YEARS (5/10/2022). Assessment   Performance deficits / Impairments: Decreased functional mobility ; Decreased ADL status; Decreased safe awareness;Decreased endurance;Decreased balance;Decreased high-level IADLs;Decreased strength  Prognosis: Good  Activity Tolerance  Activity Tolerance: Patient Tolerated treatment well;Patient limited by fatigue  Activity Tolerance Comments: Mild dizziness throughout session. Plan   Plan  Times per Week: 3-4x  Current Treatment Recommendations: Balance training, Functional mobility training, Endurance training, Gait training, Pain management, Cognitive reorientation, Safety education & training, Patient/Caregiver education & training, Self-Care / ADL, Home management training     Restrictions  Restrictions/Precautions  Restrictions/Precautions: Fall Risk, General Precautions, Up as Tolerated, Contact Precautions  Required Braces or Orthoses?: No  Position Activity Restriction  Other position/activity restrictions: up w assist  Pain assessment: Pt c/o no pain this day; mild dizziness and nausea throughout session w/no emesis. Subjective   Safety Devices  Type of Devices: All fall risk precautions in place; Bed alarm in place;Call light within reach;Gait belt;Nurse notified; Left in bed  Balance  Sitting: Without support (Seated EOB Supervision.)  Standing: With support (CGA for static standing EOB using RW, functional mobility to/from bathroom, simple grooming standing at sink, toilet transfer. Posterior LOB noted x1 after transferring to standing at sink with pt able to right self. Total time 10 minutes.)  Gait  Overall Level of Assistance: Contact-guard assistance  Assistive Device: Walker, rolling  Toilet Transfers  Toilet - Technique: Ambulating  Equipment Used: Grab bars  Toilet Transfer: Contact guard assistance     ADL  Feeding: Beverage management; Independent  Grooming: Comments: OT POC, transfer/walker safety, importance of particiapating in therapy, pursed lip breathing, use of incentive spirometer with good return. AM-PAC Score        AM-PAC Inpatient Daily Activity Raw Score: 19 (08/04/22 1239)  AM-PAC Inpatient ADL T-Scale Score : 40.22 (08/04/22 1239)  ADL Inpatient CMS 0-100% Score: 42.8 (08/04/22 1239)  ADL Inpatient CMS G-Code Modifier : CK (08/04/22 1239)    Goals  Short Term Goals  Time Frame for Short term goals: pt will by discharge  Short Term Goal 1: demo UB bathing/dressing with SUP. Short Term Goal 2: demo LB bathing/dressing with SBA, AE PRN. Short Term Goal 3: demo dynamic standing during func tasks for 10+ mins at SBA, LRD PRN, no rest breaks. Short Term Goal 4: demo good safety awareness during func mob with ADL tasks at SBA, LRD PRN. Short Term Goal 5: demo safe bed mob independently. Therapy Time   Individual Concurrent Group Co-treatment   Time In 0906         Time Out 1015         Minutes 69         Timed Code Treatment Minutes: 69 Minutes    Pt supine in bed upon therapist arrival. Pleasant and agreeable to therapy. See above for LOF for all tasks. Pt retired to supine in bed at end of session with bed alarm activated and call light within reach.       JEWEL Pereyra

## 2022-08-04 NOTE — PROGRESS NOTES
Physician Progress Note      Mia Solis  CSN #:                  [id number]  :                       1945  ADMIT DATE:       2022 2:20 PM  [address] [place] DATE:  RESPONDING  PROVIDER #:        Siddharth Barroso          QUERY TEXT:    Patient admitted with UTI. Documentation reflects sepsis in note(s) dated   -. If possible, please document in the progress notes and discharge   summary if sepsis was: The medical record reflects the following:  Risk Factors: UTI  Clinical Indicators: on admission met SIRS with T 101.3 and HR 90's, lactic   acid 2.1, per IM H&P and prog note - \"UTI, BENEDICT most likely prerenal in   the setting of sepsis\", per IM prog notes -8/3 no mention of sepsis \"   Complicated UTI, Urine Culture : Klebsiella, ESBL (extended spectrum   beta-lactamase) producing bacteria infection\"  Treatment: Cefepime changed to Meropenem, IV fluids, ID consult, labs,   cultures, monitoring    Thank you, Starr Torres, LEXY  email - [email or web address]  cell- [phone]  office hours M-F-7A-3P  Options provided:  -- sepsis treated and resolved  -- sepsis confirmed after study  -- sepsis ruled out after study  -- Other - I will add my own diagnosis  -- Disagree - Not applicable / Not valid  -- Disagree - Clinically unable to determine / Unknown  -- Refer to Clinical Documentation Reviewer    PROVIDER RESPONSE TEXT:    sepsis ruled out after study.     Query created by: Jian Lewis on 2022 11:28 AM      Electronically signed by:  Siddharth Barroso 2022 1:46 PM

## 2022-08-04 NOTE — PROGRESS NOTES
[facility]  Internal Medicine Teaching Residency Program  Inpatient Daily Progress Note  ______________________________________________________________________________    Patient: Anjum Garza  YOB: 1945   UD:[id number]    Acct: [de-identified]     Room: [facility]00-  Admit date: 7/31/2022  Today's date: 08/04/22  Number of days in the hospital: 4    SUBJECTIVE   Admitting Diagnosis: UTI (urinary tract infection)  CC: Abdominal Pain.    - Pt examined at bedside. Chart & results reviewed. Patient complains of having right mid and lower quadrant abdominal pain, but also reports that the pain is much better than yesterday. Patient also reports of having nausea after eating, but is tolerating solid food. Patient reports of having chills, sweating and subjective fever. Her Abdominal CT was done yesterday which suggests proctocolitis,  GI consulted and awaiting recommendations. Potassium replacement done and her K is 4.2  On examination, her abdomen is less tender, distended, bowel sounds are normal.    Plan for today:  Continue Antibiotics. Switch to Lovenox   Awaiting GI recommendations for proctocolitis   Review of Systems   Constitutional:  Positive for chills and fatigue. Negative for appetite change, diaphoresis and unexpected weight change. HENT: Negative. Eyes: Negative. Respiratory: Negative. Cardiovascular: Negative. Gastrointestinal:  Positive for abdominal distention, abdominal pain, constipation and nausea. Negative for anal bleeding, blood in stool, diarrhea, rectal pain and vomiting. Endocrine: Negative. Genitourinary:  Negative for dysuria and hematuria. BRIEF HISTORY     Patient, 68years old female, arrived to emergency department from care facility. Patient presented to emergency department with a complains of abdominal pain for 1 day. It was associated with high grade fever.   Patient reports of having frequent UTIs in the past.  Patient has history of single kidney and sepsis secondary to her UTI. Her urine analysis was done which was concerning for UTI. She was admitted and was treated for underlying infection. Her urine culture showed klebsiella ,ESBL. ID was consulted and patient was put on meropenem upon their recommendations. Patient had an episode of hematemesis but her hemoglobin has been stable. She is on Protonix 40 mg Oral daily. OBJECTIVE     Vital Signs:  /70   Pulse 83   Temp 98.5 °F (36.9 °C) (Oral)   Resp 17   Ht 5' 2\" (1.575 m)   Wt 179 lb (81.2 kg)   SpO2 90%   BMI 32.74 kg/m²     Temp (24hrs), Av.9 °F (37.2 °C), Min:98.5 °F (36.9 °C), Max:99.5 °F (37.5 °C)    In: 60   Out: 1750 [Urine:1750]    Physical Exam:  Physical Exam  Constitutional:       Appearance: Normal appearance. HENT:      Head: Normocephalic. Cardiovascular:      Rate and Rhythm: Normal rate. Pulses: Normal pulses. Heart sounds: Normal heart sounds. Pulmonary:      Effort: Pulmonary effort is normal.      Breath sounds: Normal breath sounds. Abdominal:      General: Bowel sounds are normal. There is distension. Palpations: Abdomen is soft. Tenderness: There is abdominal tenderness. There is no guarding or rebound. Neurological:      General: No focal deficit present. Mental Status: She is alert.          Medications:  Scheduled Medications:    enoxaparin  40 mg SubCUTAneous Daily    polyethylene glycol  17 g Oral 4x Daily    gabapentin  300 mg Oral TID    clonazePAM  0.5 mg Oral Q12H    pantoprazole  40 mg Oral QAM AC    tetrahydrozoline  2 drop Both Eyes TID    meropenem  1,000 mg IntraVENous Q8H    bisacodyl  10 mg Rectal BID    alteplase  1 mg IntraCATHeter Once    QUEtiapine  100 mg Oral Nightly    traZODone  100 mg Oral Nightly    budesonide-formoterol  2 puff Inhalation BID    busPIRone  10 mg Oral BID    escitalopram  10 mg Oral Daily    metoclopramide  5 mg Oral 4x KLEBSIELLA PNEUMONIAE >762175 CFU/ML This organism is an Extended Spectrum Beta Lactamase (ESBL)  and resistance to therapy with Penicillins, Cephalosporins and Aztreonam is expected. These organisms generally remain susceptible to Carbapenems. Consider ID Consultation. CONTACT PRECAUTIONS INDICATED. Imaging:    CT ABDOMEN PELVIS WO CONTRAST Additional Contrast? None    Result Date: 7/31/2022  1. No acute findings in the abdomen or pelvis. 2. Trace bilateral pleural effusions and trace pericardial effusion. 3. Incidental findings as above for which no dedicated follow-up is recommended. XR ABDOMEN (KUB) (SINGLE AP VIEW)    Result Date: 7/31/2022  Gaseous dilatation of the stomach. Patient may benefit from an NG tube. Moderate constipation of the colon without a large focal bolus in the rectum. The small bowel and colon are not dilated. CT HEAD WO CONTRAST    Result Date: 7/31/2022  No acute intracranial abnormality. US RENAL COMPLETE    Result Date: 8/1/2022  1. Tiny 1.2 cm lower pole left renal cortical cyst. 2. No overt left-sided hydronephrosis. Left ureteral jet is visualized. 3. Prior right nephrectomy. CT ABDOMEN PELVIS W IV CONTRAST Additional Contrast? Oral    Result Date: 8/3/2022  Since prior examination, interval development of moderate wall thickening within the rectum and sigmoid colon likely suggestive of proctocolitis. Status post cholecystectomy. Right kidney is not visualized. Axial hiatal hernia. XR CHEST PORTABLE    Result Date: 7/31/2022  No acute process. ASSESSMENT & PLAN     ASSESSMENT / PLAN:       Principal Problem:    UTI (urinary tract infection)  Plan: Plan: Urine Culture : Klebsiella  Blood Culture : no growth    Renal ultrasound : Normal  Lactic acid - normal  On Meropenem ,   Active Problems:  Depression  Plan: Controlled. - Will continue Lexapro 10 mg daily. Anxiety  Plan: Plan: - Controlled.   - Will continue Buspirone 10 mg twice

## 2022-08-04 NOTE — PLAN OF CARE
Problem: Pain  Goal: Verbalizes/displays adequate comfort level or baseline comfort level  Outcome: Progressing     Problem: Safety - Adult  Goal: Free from fall injury  Outcome: Progressing  Flowsheets (Taken 8/3/2022 1935 by Calli Martínez RN)  Free From Fall Injury: Instruct family/caregiver on patient safety     Problem: ABCDS Injury Assessment  Goal: Absence of physical injury  Outcome: Progressing  Flowsheets (Taken 8/3/2022 1935 by Calli Martínez RN)  Absence of Physical Injury: Implement safety measures based on patient assessment     Problem: Skin/Tissue Integrity  Goal: Absence of new skin breakdown  Description: 1. Monitor for areas of redness and/or skin breakdown  2. Assess vascular access sites hourly  3. Every 4-6 hours minimum:  Change oxygen saturation probe site  4. Every 4-6 hours:  If on nasal continuous positive airway pressure, respiratory therapy assess nares and determine need for appliance change or resting period.   Outcome: Progressing

## 2022-08-04 NOTE — PROGRESS NOTES
[facility] - Progress Note  Today's Date and Time: 8/4/2022, 11:31 AM    Impression :   Recurrent UTI  BENEDICT  HTN  COPD  Constipation  Leucopenia    Recommendations:   Patient could be transferred to F. Please administer 500 mg Ertapenem daily x 5 days at the [facility]    Medical Decision Making/Summary/Discussion:8/4/2022     Empiric therapy for ESBL with meropenem until definitive urine culture is back     Infection Control Recommendations   [place] Precautions  Contact Isolation     Antimicrobial Stewardship Recommendations     Simplification of therapy  Targeted therapy    Coordination of Outpatient Care:   Estimated Length of IV antimicrobials:TBD  Patient will need Midline Catheter Insertion: No  Patient will need PICC line Insertion:No  Patient will need: Home IV , [place],  SNF,  LTAC: TBD  Patient will need outpatient wound care:No    Chief complaint/reason for consultation:   Urinary tract infection, concern for ESBL      History of Present Illness:   Sandra Smith is a 68y.o.-year-old  female who was initially admitted on 7/31/2022. Patient seen at the request of Dr. Mary Parsons. INITIAL HISTORY:      Patient has a history of recurrent UTI, isolated left kidney and COPD. She was admitted with lower abdominal pain and increased urinary frequency, urgency and dysuria. Patient admits to worsening lower abdominal pain and difficulty urinating for the last 4 days and has been worsening since then. Patient has a fever spike of 101 on admission and blood pressure of 140/80. Labs showed no leukocytosis  UA his trace leukocyte Estrace, negative nitrates. Growing gram-negative rods greater than 100,000 CFU. CURRENT EVALUATION :8/4/2022  Afebrile  VSS  On room air     Urinary symptoms improving. Urine is straw yellow this morning.    Patient states she still has severe abdominal discomfort of right upper quadrant that has improved since yesterday 8-3-2022. Patient states that she has been nauseous since arrival but has yet to vomit. Patient states she is minus BM for 4 days. Enema administered. Patient has persistent cough without sputum. CT of abdomen and pelvis 8-3-22 with retained stool throughout. Absent Rt kidney. No abscess. Thickening of bowel wall in the rectum and sigmoid colon. Suggestive of proctocolitis. Leukopenia has not rebounded. WBC: 2.9. Will cinthya to monitor. Meropenem can be a potential cause. Labs, X rays reviewed: 2022    BUN: 17-> 16--> 10--> 9  Cr: 1.03-> 0.95.--> 0.85--> 0.82    WBC: 6.0-> 3.9--> 2.8-->2.8  Hb: 15.1-> 15.0-->12.5--> 12.9  Plat: 127--> 113--> 120    Ultrasound kidney:  Tiny 1.2 cm lower pole left renal cortical cyst.   No overt left-sided hydronephrosis. Left ureteral jet is visualized. Prior right nephrectomy. Cultures:  Urine:   22: Klebsiella pneumoniae greater than 100,000 CFU. ESBL +    Blood: No growth x2    Sputum:    Wound:      Imagin2022: CT abdomen/pelvis  Impression   Since prior examination, interval development of moderate wall thickening   within the rectum and sigmoid colon likely suggestive of proctocolitis. Status post cholecystectomy. Right kidney is not visualized. Axial hiatal hernia. Discussed with patient, RNLIBIA. I have personally reviewed the past medical history, past surgical history, medications, social history, and family history, and I have updated the database accordingly.   Past Medical History:     Past Medical History:   Diagnosis Date    Anxiety     Arthritis     Back pain     Bronchitis     Caffeine use     8 coffee / day    Carpal tunnel syndrome     Cataracts, bilateral     Constipation     COPD (chronic obstructive pulmonary disease) (HCC)     Emphysema    Depression     Diarrhea     GERD (gastroesophageal reflux disease)     H/O gastric ulcer     Hyperlipidemia     Hypertension     Mumps     MVP (mitral valve prolapse)     Dr. Potts Manual in May 2019    Recurrent UTI     Dr. Steven Rutledge    Sinusitis     Tracheostomy in place [facility])     Ulcerative esophagitis     Wellness examination     Dr. Feliz Lynn seen in Jan 2020       Past Surgical  History:     Past Surgical History:   Procedure Laterality Date    APPENDECTOMY      CARPAL [address] [facility] [place] [facility], LAPAROSCOPIC N/A 05/22/2020    XI LAPAROSCOPIC ROBOTIC CHOLECYSTECTOMY, PYLOROPLASTY performed by Rasheeda Moore MD at [place] [address] N/A 1/28/2022    COLONOSCOPY POLYPECTOMY HOT performed by Hernesto Wick MD at [facility] 27      ERCP  05/21/2020    with stent insertion, balloon dilation, sphinctereotomy    ERCP  05/21/2020    ** CASE IN OR WITY GI STAFF** ERCP STENT INSERTION performed by Hernesto Wick MD at [facility]    ERCP  05/21/2020    ** CASE IN OR WITH GI STAFF**ERCP SPHINCTER/PAPILLOTOMY performed by Hernesto Wick MD at [facility]    ERCP  05/21/2020    ** CASE IN OR WITH GI STAFF**ERCP DILATION BALLOON performed by Hernesto Wick MD at [place] [facility] [facility]    ERCP N/A 2/8/2021    ERCP STENT REMOVAL performed by Tahira Albrecht MD at [facility]    ERCP  2/8/2021    ERCP STONE REMOVAL performed by Tahira Albrecht MD at [address] [place] [address]    GASTRIC FUNDOPLICATION N/A 76/03/2659    XI LAPAROSCOPIC ROBOTIC [address] [facility] [address], CHERYL FUNDOPLICATION performed by Rasheeda Moore MD at [address] 03/27/2020    EGD PEG TUBE PLACEMENT performed by Rasheeda Moore MD at [address] N/A 2/8/2021    **CASE IN O.R. W/ GI STAFF - EGD WITH REMOVAL PEG TUBE performed by Tahira Albrecht MD at [facility]    [facility] SURGERY      [facility] [place] OF [place], [facility]. CATH POWER PICC TRIPLE  03/04/2020         HERNIA REPAIR      Hiatal hernia    HYSTERECTOMY (CERVIX STATUS UNKNOWN)      Abdominal    IR NONTUNNELED VASCULAR CATHETER  5/10/2022    IR NONTUNNELED VASCULAR CATHETER 5/10/2022 Jorge Luis Mclean MD [facility] SPECIAL PROCEDURES    JOINT REPLACEMENT Right     right hip    OVARY REMOVAL      RHINOPLASTY      TONSILLECTOMY      TOTAL HIP ARTHROPLASTY      TOTAL NEPHRECTOMY      atrophic from infections, age 13    TRACHEOSTOMY N/A 03/27/2020    **ADD ON **WANTS 10:00AM **TRACHEOTOMY performed by Erin Adhikari MD at [facility] N/A 04/02/2020    TRACHEOTOMY EXCHANGE performed by Erin Adhikari MD at [address] [facility] 03/17/2020    BEDSIDE EGD ESOPHAGOGASTRODUODENOSCOPY (ICU) performed by Erin Adhikari MD at [facility] 05/18/2020    EGD BIOPSY performed by Zahida Pretty MD at [address] [place] [address],[facility]  05/18/2020    EGD DILATION BALLOON performed by Zahida Pretty MD at [address] [place] [address],[facility] N/A 07/06/2020    ** CASE IN O.R. WITH GI STAFF** EGD ESOPHAGOGASTRODUODENOSCOPY performed by Ray Baig MD at [address] [facility] 11/09/2020    EGD DIAGNOSTIC ONLY performed by Kanika Egan MD at [address] [place] [address],[facility]  02/08/2021    - EGD WITH REMOVAL PEG TUBE,ERCP STENT REMOVAL,ERCP STONE REMOVAL    UPPER GASTROINTESTINAL ENDOSCOPY N/A 11/24/2021    EGD BIOPSY performed by Kanika Egan MD at [facility]       Medications:      enoxaparin  40 mg SubCUTAneous Daily    polyethylene glycol  17 g Oral BID    gabapentin  300 mg Oral TID    clonazePAM  0.5 mg Oral Q12H    pantoprazole  40 mg Oral QAM AC    tetrahydrozoline  2 drop Both Eyes TID    meropenem  1,000 mg IntraVENous Q8H    bisacodyl  10 mg Rectal BID    alteplase  1 mg IntraCATHeter Once    QUEtiapine  100 mg Oral Nightly    traZODone  100 mg Oral Nightly    budesonide-formoterol  2 puff Inhalation BID    busPIRone  10 mg Oral BID    escitalopram  10 mg Oral Daily    metoclopramide  5 mg Oral 4x Daily atorvastatin  20 mg Oral Daily    sodium chloride flush  5-40 mL IntraVENous 2 times per day    metoprolol succinate  25 mg Oral Daily    senna  2 tablet Oral Nightly       Social History:     Social History     Socioeconomic History    Marital status:      Spouse name: Not on file    Number of children: 2    Years of education: Not on file    Highest education level: Not on file   Occupational History    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   Tobacco Use    Smoking status: Former     Packs/day: 1.00     Years: 50.00     Pack years: 50.00     Types: Cigarettes    Smokeless tobacco: Never    Tobacco comments:     quit 1 year ago    Vaping Use    Vaping Use: Former    Substances: Always   Substance and Sexual Activity    Alcohol use: No    Drug use: No    Sexual activity: Never   Other Topics Concern    Not on file   Social History Narrative    Not on file     Social Determinants of Health     Financial Resource Strain: Not on file   Food Insecurity: Not on file   Transportation Needs: Not on file   Physical Activity: Not on file   Stress: Not on file   Social Connections: Not on file   Intimate Partner Violence: Not on file   Housing Stability: Not on file       Family History:     Family History   Problem Relation Age of Onset    Cancer Mother         uterine    Heart Attack Mother     Heart Attack Father     Other Maternal Grandfather         foot gangrene    Other Paternal Grandmother         bleeding ulcers    Other Paternal Grandfather         lung cancer        Allergies:   Prednisone, Compazine [prochlorperazine maleate], Penicillin v potassium, and Penicillins     Review of Systems:   Constitutional: No fevers or chills. No systemic complaints  Head: No headaches  Eyes: No double vision or blurry vision. No conjunctival inflammation. ENT: No sore throat or runny nose. No hearing loss, tinnitus or vertigo. Cardiovascular: No chest pain or palpitations. No shortness of breath.  No HOLLAND  Lung: No shortness of breath or cough. No sputum production  Abdomen: No nausea, vomiting, diarrhea,. Upper abdominal pain. Genitourinary: No increased urinary frequency, or dysuria. No hematuria. No suprapubic or CVA pain  Musculoskeletal: No muscle aches or pains. No joint effusions, swelling or deformities  Hematologic: No bleeding or bruising. Neurologic: No headache, weakness, numbness, or tingling. Integument: No rash, no ulcers. Psychiatric: No depression. Endocrine: No polyuria, no polydipsia, no polyphagia. Physical Examination :   Patient Vitals for the past 8 hrs:   BP Temp Temp src Pulse Resp SpO2   08/04/22 1124 101/70 98.5 °F (36.9 °C) Oral 83 17 90 %   08/04/22 0732 105/72 99.5 °F (37.5 °C) Oral 77 18 90 %     General Appearance: Awake, alert, and in no apparent distress  Head:  Normocephalic, no trauma  Eyes: Pupils equal, round, reactive to light and accommodation; extraocular movements intact; sclera anicteric; conjunctivae pink. No embolic phenomena. ENT: Oropharynx clear, without erythema, exudate, or thrush. No tenderness of sinuses. Mouth/throat: mucosa pink and moist. No lesions. Dentition in good repair. Neck:Supple, without lymphadenopathy. Thyroid normal, No bruits. Pulmonary/Chest: Clear to auscultation, without wheezes, rales, or rhonchi. No dullness to percussion. Cardiovascular: Regular rate and rhythm without murmurs, rubs, or gallops. Abdomen: Tender to touch RUQ. Bowel sounds normal. No organomegaly  All four Extremities: No cyanosis, clubbing, edema, or effusions. Neurologic: No gross sensory or motor deficits. Skin: Warm and dry with good turgor. No signs of peripheral arterial or venous insufficiency. No ulcerations. No open wounds.     Medical Decision Making -Laboratory:   I have independently reviewed/ordered the following labs:    CBC with Differential:   Recent Labs     08/03/22  0354 08/03/22  1621 08/03/22  2156 08/04/22  0738   WBC 2.8*  --   --  2.9*   HGB 12.5   < > 13.0 12.9   HCT 40.4   < > 41.4 40.9   PLT 97*  --   --  See Reflexed IPF Result   LYMPHOPCT 15*  --   --  21*   MONOPCT 6  --   --  13*    < > = values in this interval not displayed. BMP:   Recent Labs     08/03/22  0354 08/04/22  0738   * 135   K 3.6* 4.2    101   CO2 21 24   BUN 10 9   CREATININE 0.85 0.82     Hepatic Function Panel:   No results for input(s): PROT, LABALBU, BILIDIR, IBILI, BILITOT, ALKPHOS, ALT, AST in the last 72 hours. No results for input(s): RPR in the last 72 hours. No results for input(s): HIV in the last 72 hours. No results for input(s): BC in the last 72 hours. Lab Results   Component Value Date/Time    MUCUS NOT REPORTED 12/18/2021 12:26 PM    RBC 4.47 08/04/2022 07:38 AM    TRICHOMONAS NOT REPORTED 12/18/2021 12:26 PM    WBC 2.9 08/04/2022 07:38 AM    YEAST NOT REPORTED 12/18/2021 12:26 PM    TURBIDITY Clear 07/31/2022 03:53 PM     Lab Results   Component Value Date/Time    CREATININE 0.82 08/04/2022 07:38 AM    GLUCOSE 93 08/04/2022 07:38 AM       Medical Decision Making-Imaging:       Medical Decision Exigtd-Gxxzlygu-Zrhcu:       Contains abnormal data Culture, Urine  Order: [id number]  Status: Final result    Visible to patient: No (not released)    Next appt: None    Specimen Information: Urine, straight catheter        0 Result Notes    Component 7/31/22 1553    Specimen Description . URINE,STRAIGHT CATHETER    Culture  Abnormal   KLEBSIELLA PNEUMONIAE >[id number] CFU/ML This organism is an Extended Spectrum Beta Lactamase (ESBL)  and resistance to therapy with Penicillins, Cephalosporins and Aztreonam is expected. These organisms generally remain susceptible to Carbapenems. Consider ID Consultation. CONTACT PRECAUTIONS INDICATED.      Raquel Page 30              Specimen Collected: 07/31/22 15:53 EDT Last Resulted: 08/03/22 00:27 EDT             Medical Decision Making-Other:     Note:  Labs, medications, radiologic studies were reviewed with personal review of films  Moderate Large amounts of data were reviewed  Discussed with nursing Staff, Discharge planner  Infection Control and Prevention measures reviewed  All prior entries were reviewed  Administer medications as ordered  Prognosis: Good  Discharge planning reviewed  Follow up as outpatient. Thank you for allowing us to participate in the care of this patient. Please call with questions. Rachel Moseley MD  Internal Medicine Resident, PGY-2  [facility]:    I have discussed the case, including pertinent history and exam findings with the residents and students. I have seen and examined the patient and the key elements of the encounter have been performed by me. I was present when the student obtained his information or examined the patient. I have reviewed the laboratory data, other diagnostic studies and discussed them with the residents. I have updated the medical record where necessary. I agree with the assessment, plan and orders as documented by the resident/ student.     Araceli Nettles MD.    8/4/2022,11:31 AM

## 2022-08-05 VITALS
BODY MASS INDEX: 32.94 KG/M2 | RESPIRATION RATE: 18 BRPM | HEART RATE: 79 BPM | SYSTOLIC BLOOD PRESSURE: 128 MMHG | OXYGEN SATURATION: 98 % | TEMPERATURE: 98.5 F | HEIGHT: 62 IN | DIASTOLIC BLOOD PRESSURE: 81 MMHG | WEIGHT: 179 LBS

## 2022-08-05 LAB
ABSOLUTE EOS #: 0.12 K/UL (ref 0–0.4)
ABSOLUTE IMMATURE GRANULOCYTE: 0.03 K/UL (ref 0–0.3)
ABSOLUTE LYMPH #: 0.56 K/UL (ref 1–4.8)
ABSOLUTE MONO #: 0.47 K/UL (ref 0.1–0.8)
ANION GAP SERPL CALCULATED.3IONS-SCNC: 9 MMOL/L (ref 9–17)
BASOPHILS # BLD: 0 % (ref 0–2)
BASOPHILS ABSOLUTE: 0 K/UL (ref 0–0.2)
BUN BLDV-MCNC: 8 MG/DL (ref 8–23)
CALCIUM SERPL-MCNC: 8.9 MG/DL (ref 8.6–10.4)
CHLORIDE BLD-SCNC: 104 MMOL/L (ref 98–107)
CO2: 24 MMOL/L (ref 20–31)
CREAT SERPL-MCNC: 0.73 MG/DL (ref 0.5–0.9)
CULTURE: NORMAL
CULTURE: NORMAL
EOSINOPHILS RELATIVE PERCENT: 4 % (ref 1–4)
GFR AFRICAN AMERICAN: >60 ML/MIN
GFR NON-AFRICAN AMERICAN: >60 ML/MIN
GFR SERPL CREATININE-BSD FRML MDRD: NORMAL ML/MIN/{1.73_M2}
GLUCOSE BLD-MCNC: 96 MG/DL (ref 70–99)
HCT VFR BLD CALC: 40.7 % (ref 36.3–47.1)
HEMOGLOBIN: 12.9 G/DL (ref 11.9–15.1)
IMMATURE GRANULOCYTES: 1 %
LYMPHOCYTES # BLD: 18 % (ref 24–44)
Lab: NORMAL
Lab: NORMAL
MCH RBC QN AUTO: 29.7 PG (ref 25.2–33.5)
MCHC RBC AUTO-ENTMCNC: 31.7 G/DL (ref 28.4–34.8)
MCV RBC AUTO: 93.6 FL (ref 82.6–102.9)
MONOCYTES # BLD: 15 % (ref 1–7)
MORPHOLOGY: ABNORMAL
NRBC AUTOMATED: 0 PER 100 WBC
PDW BLD-RTO: 15.6 % (ref 11.8–14.4)
PLATELET # BLD: ABNORMAL K/UL (ref 138–453)
PLATELET, FLUORESCENCE: 116 K/UL (ref 138–453)
PLATELET, IMMATURE FRACTION: 10.2 % (ref 1.1–10.3)
POTASSIUM SERPL-SCNC: 4.5 MMOL/L (ref 3.7–5.3)
RBC # BLD: 4.35 M/UL (ref 3.95–5.11)
SEG NEUTROPHILS: 62 % (ref 36–66)
SEGMENTED NEUTROPHILS ABSOLUTE COUNT: 1.92 K/UL (ref 1.8–7.7)
SODIUM BLD-SCNC: 137 MMOL/L (ref 135–144)
SPECIMEN DESCRIPTION: NORMAL
SPECIMEN DESCRIPTION: NORMAL
WBC # BLD: 3.1 K/UL (ref 3.5–11.3)

## 2022-08-05 PROCEDURE — 85025 COMPLETE CBC W/AUTO DIFF WBC: CPT

## 2022-08-05 PROCEDURE — 6370000000 HC RX 637 (ALT 250 FOR IP): Performed by: NURSE PRACTITIONER

## 2022-08-05 PROCEDURE — 6370000000 HC RX 637 (ALT 250 FOR IP)

## 2022-08-05 PROCEDURE — 6360000002 HC RX W HCPCS: Performed by: STUDENT IN AN ORGANIZED HEALTH CARE EDUCATION/TRAINING PROGRAM

## 2022-08-05 PROCEDURE — 36415 COLL VENOUS BLD VENIPUNCTURE: CPT

## 2022-08-05 PROCEDURE — 6370000000 HC RX 637 (ALT 250 FOR IP): Performed by: STUDENT IN AN ORGANIZED HEALTH CARE EDUCATION/TRAINING PROGRAM

## 2022-08-05 PROCEDURE — 80048 BASIC METABOLIC PNL TOTAL CA: CPT

## 2022-08-05 PROCEDURE — 99239 HOSP IP/OBS DSCHRG MGMT >30: CPT | Performed by: INTERNAL MEDICINE

## 2022-08-05 PROCEDURE — 6360000002 HC RX W HCPCS

## 2022-08-05 PROCEDURE — 85055 RETICULATED PLATELET ASSAY: CPT

## 2022-08-05 PROCEDURE — 76937 US GUIDE VASCULAR ACCESS: CPT

## 2022-08-05 PROCEDURE — C1751 CATH, INF, PER/CENT/MIDLINE: HCPCS

## 2022-08-05 PROCEDURE — 6360000002 HC RX W HCPCS: Performed by: NURSE PRACTITIONER

## 2022-08-05 PROCEDURE — 2580000003 HC RX 258: Performed by: STUDENT IN AN ORGANIZED HEALTH CARE EDUCATION/TRAINING PROGRAM

## 2022-08-05 PROCEDURE — 99232 SBSQ HOSP IP/OBS MODERATE 35: CPT | Performed by: INTERNAL MEDICINE

## 2022-08-05 PROCEDURE — 94640 AIRWAY INHALATION TREATMENT: CPT

## 2022-08-05 RX ORDER — ONDANSETRON 4 MG/1
4 TABLET, ORALLY DISINTEGRATING ORAL EVERY 8 HOURS PRN
Qty: 90 TABLET | Refills: 0 | Status: SHIPPED | OUTPATIENT
Start: 2022-08-05 | End: 2022-09-04

## 2022-08-05 RX ADMIN — BUDESONIDE AND FORMOTEROL FUMARATE DIHYDRATE 2 PUFF: 160; 4.5 AEROSOL RESPIRATORY (INHALATION) at 08:36

## 2022-08-05 RX ADMIN — METOCLOPRAMIDE 5 MG: 5 TABLET ORAL at 05:36

## 2022-08-05 RX ADMIN — MEROPENEM 1000 MG: 1 INJECTION, POWDER, FOR SOLUTION INTRAVENOUS at 03:47

## 2022-08-05 RX ADMIN — ACETAMINOPHEN 650 MG: 325 TABLET ORAL at 16:25

## 2022-08-05 RX ADMIN — CLONAZEPAM 0.5 MG: 0.5 TABLET ORAL at 09:18

## 2022-08-05 RX ADMIN — DIPHENHYDRAMINE HYDROCHLORIDE 25 MG: 50 INJECTION, SOLUTION INTRAMUSCULAR; INTRAVENOUS at 10:42

## 2022-08-05 RX ADMIN — ONDANSETRON 4 MG: 2 INJECTION INTRAMUSCULAR; INTRAVENOUS at 10:35

## 2022-08-05 RX ADMIN — ATORVASTATIN CALCIUM 20 MG: 20 TABLET, FILM COATED ORAL at 09:19

## 2022-08-05 RX ADMIN — METOCLOPRAMIDE 5 MG: 5 TABLET ORAL at 16:25

## 2022-08-05 RX ADMIN — GABAPENTIN 300 MG: 300 CAPSULE ORAL at 09:18

## 2022-08-05 RX ADMIN — PANTOPRAZOLE SODIUM 40 MG: 40 TABLET, DELAYED RELEASE ORAL at 06:23

## 2022-08-05 RX ADMIN — ANTACID TABLETS 500 MG: 500 TABLET, CHEWABLE ORAL at 09:19

## 2022-08-05 RX ADMIN — TETRAHYDROZOLINE HCL 2 DROP: 0.05 SOLUTION/ DROPS OPHTHALMIC at 09:20

## 2022-08-05 RX ADMIN — MEROPENEM 1000 MG: 1 INJECTION, POWDER, FOR SOLUTION INTRAVENOUS at 10:39

## 2022-08-05 RX ADMIN — ACETAMINOPHEN 650 MG: 325 TABLET ORAL at 10:35

## 2022-08-05 RX ADMIN — BUSPIRONE HYDROCHLORIDE 10 MG: 10 TABLET ORAL at 09:18

## 2022-08-05 RX ADMIN — METOPROLOL SUCCINATE 25 MG: 25 TABLET, FILM COATED, EXTENDED RELEASE ORAL at 09:18

## 2022-08-05 RX ADMIN — GABAPENTIN 300 MG: 300 CAPSULE ORAL at 13:55

## 2022-08-05 RX ADMIN — ACETAMINOPHEN 650 MG: 325 TABLET ORAL at 05:35

## 2022-08-05 RX ADMIN — ESCITALOPRAM OXALATE 10 MG: 10 TABLET ORAL at 09:19

## 2022-08-05 RX ADMIN — GUAIFENESIN 200 MG: 200 SOLUTION ORAL at 10:35

## 2022-08-05 RX ADMIN — TETRAHYDROZOLINE HCL 2 DROP: 0.05 SOLUTION/ DROPS OPHTHALMIC at 13:55

## 2022-08-05 RX ADMIN — METOCLOPRAMIDE 5 MG: 5 TABLET ORAL at 09:18

## 2022-08-05 RX ADMIN — ENOXAPARIN SODIUM 40 MG: 100 INJECTION SUBCUTANEOUS at 09:18

## 2022-08-05 ASSESSMENT — PAIN DESCRIPTION - DESCRIPTORS: DESCRIPTORS: ACHING

## 2022-08-05 ASSESSMENT — PAIN SCALES - GENERAL
PAINLEVEL_OUTOF10: 3
PAINLEVEL_OUTOF10: 6
PAINLEVEL_OUTOF10: 3

## 2022-08-05 ASSESSMENT — PAIN - FUNCTIONAL ASSESSMENT: PAIN_FUNCTIONAL_ASSESSMENT: ACTIVITIES ARE NOT PREVENTED

## 2022-08-05 ASSESSMENT — PAIN DESCRIPTION - LOCATION
LOCATION: ABDOMEN
LOCATION: ABDOMEN

## 2022-08-05 ASSESSMENT — ENCOUNTER SYMPTOMS
ABDOMINAL DISTENTION: 0
ABDOMINAL PAIN: 0
EYES NEGATIVE: 1
RESPIRATORY NEGATIVE: 1

## 2022-08-05 NOTE — PROGRESS NOTES
[facility]  Internal Medicine Teaching Residency Program  Inpatient Daily Progress Note  ______________________________________________________________________________    Patient: Tigist Jane  YOB: 1945   RLW:[id number]    Acct: [de-identified]     Room: [facility]/7362-00  Admit date: 7/31/2022  Today's date: 08/05/22  Number of days in the hospital: 5    SUBJECTIVE   Admitting Diagnosis: UTI (urinary tract infection)  CC: Abdominal Pain.    - Pt examined at bedside. Chart & results reviewed. -Patient is hemodynamically stable  -Patient is tolerating her diet.  -She reports that her abdominal pain is much better.  -Infectious disease is consulted to reconcile the antibiotics on discharge  On examination her abdomen is soft, mildly tender, nondistended. Bowel sounds are normal    Plan for today:  -Plan to discharge the patient  Consulted ID to reconcile antibiotics, awaiting recommendations. Review of Systems   Constitutional:  Negative for appetite change, chills and fever. HENT: Negative. Eyes: Negative. Respiratory: Negative. Cardiovascular: Negative. Gastrointestinal:  Negative for abdominal distention and abdominal pain. Endocrine: Negative. Genitourinary: Negative. BRIEF HISTORY       Patient, 68years old female, arrived to emergency department from care facility. Patient presented to emergency department with a complains of abdominal pain for 1 day. It was associated with high grade fever. Patient reports of having frequent UTIs in the past.  Patient has history of single kidney and sepsis secondary to her UTI. Her urine analysis was done which was concerning for UTI. She was admitted and was treated for underlying infection. Her urine culture showed klebsiella ,ESBL. ID was consulted and patient was put on meropenem upon their recommendations. Patient had an episode of hematemesis but her hemoglobin has been stable.  She is on Protonix 40 mg Oral daily. OBJECTIVE     Vital Signs:  /81   Pulse 79   Temp 98.5 °F (36.9 °C) (Oral)   Resp 18   Ht 5' 2\" (1.575 m)   Wt 179 lb (81.2 kg)   SpO2 98%   BMI 32.74 kg/m²     Temp (24hrs), Av.5 °F (36.9 °C), Min:98.4 °F (36.9 °C), Max:98.6 °F (37 °C)    In: 3927.9   Out:  [Urine:]    Physical Exam:  Physical Exam  Constitutional:       Appearance: Normal appearance. HENT:      Head: Normocephalic. Cardiovascular:      Rate and Rhythm: Normal rate. Pulses: Normal pulses. Heart sounds: Normal heart sounds. Pulmonary:      Effort: Pulmonary effort is normal.      Breath sounds: Normal breath sounds. Abdominal:      General: There is no distension. Palpations: Abdomen is soft.          Medications:  Scheduled Medications:    enoxaparin  40 mg SubCUTAneous Daily    polyethylene glycol  17 g Oral 4x Daily    gabapentin  300 mg Oral TID    clonazePAM  0.5 mg Oral Q12H    pantoprazole  40 mg Oral QAM AC    tetrahydrozoline  2 drop Both Eyes TID    meropenem  1,000 mg IntraVENous Q8H    bisacodyl  10 mg Rectal BID    alteplase  1 mg IntraCATHeter Once    QUEtiapine  100 mg Oral Nightly    traZODone  100 mg Oral Nightly    budesonide-formoterol  2 puff Inhalation BID    busPIRone  10 mg Oral BID    escitalopram  10 mg Oral Daily    metoclopramide  5 mg Oral 4x Daily    atorvastatin  20 mg Oral Daily    sodium chloride flush  5-40 mL IntraVENous 2 times per day    metoprolol succinate  25 mg Oral Daily    senna  2 tablet Oral Nightly     Continuous Infusions:    sodium chloride      sodium chloride 75 mL/hr at 22 0638     PRN Medicationscalcium carbonate, 500 mg, TID PRN  diphenhydrAMINE, 25 mg, Q6H PRN  albuterol, 2.5 mg, Q6H PRN  sodium chloride flush, 5-40 mL, PRN  sodium chloride, , PRN  ondansetron, 4 mg, Q8H PRN   Or  ondansetron, 4 mg, Q6H PRN  acetaminophen, 650 mg, Q6H PRN   Or  acetaminophen, 650 mg, Q6H PRN  hydrALAZINE, 10 mg, Q6H Date: 7/31/2022  Gaseous dilatation of the stomach. Patient may benefit from an NG tube. Moderate constipation of the colon without a large focal bolus in the rectum. The small bowel and colon are not dilated. CT HEAD WO CONTRAST    Result Date: 7/31/2022  No acute intracranial abnormality. US RENAL COMPLETE    Result Date: 8/1/2022  1. Tiny 1.2 cm lower pole left renal cortical cyst. 2. No overt left-sided hydronephrosis. Left ureteral jet is visualized. 3. Prior right nephrectomy. CT ABDOMEN PELVIS W IV CONTRAST Additional Contrast? Oral    Result Date: 8/3/2022  Since prior examination, interval development of moderate wall thickening within the rectum and sigmoid colon likely suggestive of proctocolitis. Status post cholecystectomy. Right kidney is not visualized. Axial hiatal hernia. XR CHEST PORTABLE    Result Date: 7/31/2022  No acute process. ASSESSMENT & PLAN     ASSESSMENT / PLAN:       Principal Problem:    UTI (urinary tract infection)  Plan: ID recommends 500mg Ertapenem daily x 5days   Active Problems:       Depression  Plan: Controlled. - Will continue Lexapro 10 mg daily    Anxiety  Plan: Controlled. - Will continue Buspirone 10 mg twice daily      BENEDICT (acute kidney injury) ([facility] [address]  Plan: resolved   Will monitor I/Os. Coffee ground emesis  Plan: Resolved  Protonix 40mg      Diet: Regular  DVT ppx : lovenox  GI ppx: Protonix    PT/OT/SW: Following  Discharge Planning: Nabil Conrad M.D. Internal Medicine Resident PGY-1  [facility]. 12:52 PM 8/5/2022     Please note that part of this chart was generated using voice recognition dictation software. Although every effort was made to ensure the accuracy of this automated transcription, some errors in transcription may have occurred.

## 2022-08-05 NOTE — DISCHARGE INSTR - COC
Continuity of Care Form    Patient Name: Bassam Rosenberg   :  1945  MRN:  [id number]    Admit date:  2022  Discharge date:  2022    Code Status Order: Full Code   Advance Directives:     Admitting Physician:  Ciaran Fowler MD  PCP: No primary care provider on file.     Discharging Nurse: [address] Unit/Room#: 3240/0621-73  Discharging Unit Phone Number: [phone]    Emergency Contact:   Extended Emergency Contact Information  Primary Emergency Contact: Hamilton Ramirez  Home Phone: [phone]  Relation: Child    Past Surgical History:  Past Surgical History:   Procedure Laterality Date    APPENDECTOMY      CARPAL TUNNEL RELEASE      CHOLECYSTECTOMY, LAPAROSCOPIC N/A 2020    XI LAPAROSCOPIC ROBOTIC CHOLECYSTECTOMY, PYLOROPLASTY performed by Nicky Loja MD at [address] [facility] N/A 2022    COLONOSCOPY POLYPECTOMY HOT performed by Kristy Bernstein MD at [place] [address]      ERCP  2020    with stent insertion, balloon dilation, sphinctereotomy    ERCP  2020    ** CASE IN OR WITY GI STAFF** ERCP STENT INSERTION performed by Kristy Bernstein MD at [facility]    ERCP  2020    ** CASE IN OR WITH GI STAFF**ERCP SPHINCTER/PAPILLOTOMY performed by Kristy Bernstein MD at [facility]    ERCP  2020    ** CASE IN OR WITH GI STAFF**ERCP DILATION BALLOON performed by Kristy Bernstein MD at [facility]    ERCP N/A 2021    ERCP STENT REMOVAL performed by Sophy Kelly MD at [facility]    ERCP  2021    ERCP STONE REMOVAL performed by Sophy Kelly MD at [address]    GASTRIC FUNDOPLICATION N/A     XI LAPAROSCOPIC ROBOTIC [address], CHERYL FUNDOPLICATION performed by Nicky Loja MD at [address] [address] 2020    EGD PEG TUBE PLACEMENT performed by Nicky Loja MD at [address] [facility] [address] 2021    **CASE IN O.R. W/ GI STAFF - EGD WITH REMOVAL PEG TUBE performed by Sade Valverde MD at [facility]    HAND SURGERY      [facility]. CATH POWER PICC TRIPLE  03/04/2020         HERNIA REPAIR      Hiatal hernia    HYSTERECTOMY (CERVIX STATUS UNKNOWN)      Abdominal    IR NONTUNNELED VASCULAR CATHETER  5/10/2022    IR NONTUNNELED VASCULAR CATHETER 5/10/2022 Cari Brooks MD [facility]    JOINT REPLACEMENT Right     right hip    OVARY REMOVAL      RHINOPLASTY      TONSILLECTOMY      TOTAL HIP ARTHROPLASTY      TOTAL NEPHRECTOMY      atrophic from infections, age 13    TRACHEOSTOMY N/A 03/27/2020    **ADD ON **WANTS 10:00AM **TRACHEOTOMY performed by Aroldo Hayden MD at [address] N/A 04/02/2020    TRACHEOTOMY EXCHANGE performed by Aroldo Hayden MD at [address] [facility] 03/17/2020    BEDSIDE EGD ESOPHAGOGASTRODUODENOSCOPY (ICU) performed by Aroldo Hayden MD at [place] [address] 05/18/2020    EGD BIOPSY performed by Leona Vilchis MD at [place] [address]  05/18/2020    EGD DILATION BALLOON performed by Leona Vilchis MD at [facility] N/A 07/06/2020    ** CASE IN O.R. WITH GI STAFF** EGD ESOPHAGOGASTRODUODENOSCOPY performed by Patricia Wynn MD at [facility] 11/09/2020    EGD DIAGNOSTIC ONLY performed by Cara Dubon MD at [place] [address]  02/08/2021    - EGD WITH REMOVAL PEG TUBE,ERCP STENT REMOVAL,ERCP STONE REMOVAL    UPPER GASTROINTESTINAL ENDOSCOPY N/A 11/24/2021    EGD BIOPSY performed by Cara Dubon MD at [facility]       Immunization History:   Immunization History   Administered Date(s) Administered    COVID-19, MODERNA BLUE border, Primary or Immunocompromised, (age 12y+), IM, 100 mcg/0.5mL 01/07/2021, 02/04/2021, 12/25/2021       Active Problems:  Patient Active Problem List   Diagnosis Code    Frequency of urination R35.0    Backache M54.9    GERD (gastroesophageal reflux disease) K21.9    MVP (mitral valve prolapse) I34.1    Depression F32. A    Anxiety F41.9    BENEDICT (acute kidney injury) ([facility] N17.9    Dysphagia R13.10    Hiatal hernia K44.9    History of repair of hiatal hernia Z98.890, Z87.19    Centrilobular emphysema ([facility]) J43.2    Esophageal dilatation K22.89    Shock ([facility]) R57.9    Fever R50.9    Critical illness polyneuropathy ([facility]) G62.81    Gastric outlet obstruction K31.1    Recurrent UTI N39.0    Tracheostomy in place ([facility] Z93.0    H/O gastric ulcer Z87.11    Hyperlipidemia E78.5    Essential hypertension I10    Tobacco abuse Z72.0    Chronic respiratory failure with hypoxia ([facility]) J96.11    S/P percutaneous endoscopic gastrostomy (PEG) tube placement ([facility]) Z93.1    S/P Nissen fundoplication (without gastrostomy tube) procedure Z98.890    Anemia due to blood loss D50.0    Phlebitis after infusion T80. 1XXA, I80.9    Esophagitis determined by endoscopy K20.90    Expressive aphasia R47.01    Transient speech disturbance R47.9    Speech disturbance R47.9    Coffee ground emesis K92.0    Acute cystitis without hematuria N30.00    Ulcerative esophagitis K22.10    Septicemia ([facility]) A41.9    Lower abdominal pain R10.30    Ileus ([facility]) K56.7    Chronic pain G89.29    Dyspnea on exertion R06.00    Orthostatic hypotension I95.1    Stage 3 chronic kidney disease ([facility]) N18.30    Difficulty walking R26.2    Class 1 obesity in adult E66.9    Generalized abdominal pain R10.84    Multifocal pneumonia J18.9    Other constipation K59.09    Intractable abdominal pain R10.9    UTI (urinary tract infection) N39.0    ESBL (extended spectrum beta-lactamase) producing bacteria infection U31.8, Z76.94    Complicated UTI (urinary tract infection) N39.0       Isolation/Infection:   Isolation            Contact          Patient Infection Status       Infection Onset Added Last Indicated Last Indicated By Review Planned Expiration Resolved Resolved By    ESBL (Extended Spectrum Beta Lactamase) 12/13/20 12/15/20 07/31/22 Culture, Urine        Klebsiella urine 7/2022      MDRO (multi-drug resistant organism) 12/13/20 12/15/20 07/31/22 Culture, Urine        Proteus Urine 2/2021  Klebsiella Urine 7/2022            Nurse Assessment:  Last Vital Signs: /81   Pulse 79   Temp 98.5 °F (36.9 °C) (Oral)   Resp 18   Ht 5' 2\" (1.575 m)   Wt 179 lb (81.2 kg)   SpO2 98%   BMI 32.74 kg/m²     Last documented pain score (0-10 scale): Pain Level: 6  Last Weight:   Wt Readings from Last 1 Encounters:   07/31/22 179 lb (81.2 kg)     Mental Status:  oriented and alert    IV Access:  - PICC - site  L Cephalic, insertion date: 8/5/22    Nursing Mobility/ADLs:  Walking   Assisted  Transfer  Assisted  Bathing  Assisted  Dressing  Assisted  Toileting  Assisted  Feeding  Independent  Med Admin  Assisted  Med Delivery   whole    Wound Care Documentation and Therapy:  Wound 07/06/20 Coccyx Stage 1 (Active)   Number of days: 759        Elimination:  Continence: Bowel: Yes  Bladder: Intermittent Incontinence   Urinary Catheter: None   Colostomy/Ileostomy/Ileal Conduit: No       Date of Last BM: 8/5/22    Intake/Output Summary (Last 24 hours) at 8/5/2022 1133  Last data filed at 8/5/2022 6872  Gross per 24 hour   Intake 3927.94 ml   Output 1100 ml   Net 2827.94 ml     I/O last 3 completed shifts: In: 3987.9 [P.O.:260; I.V.:3127.9;  IV Piggyback:600]  Out: 3456 [Urine:2850]    Safety Concerns:     History of Falls (last 30 days) and At Risk for Falls    Impairments/Disabilities:      None    Nutrition Therapy:  Current Nutrition Therapy:   - Oral Diet:  General    Routes of Feeding: Oral  Liquids: No Restrictions  Daily Fluid Restriction: no  Last Modified Barium Swallow with Video (Video Swallowing Test): not done    Treatments at the Time of Hospital Discharge:   Respiratory Treatments: Oxygen Therapy:  is not on home oxygen therapy. Ventilator:    - No ventilator support    Rehab Therapies: Physical Therapy and Occupational Therapy  Weight Bearing Status/Restrictions: No weight bearing restrictions  Other Medical Equipment (for information only, NOT a DME order):  wheelchair and walker  Other Treatments:     Patient's personal belongings (please select all that are sent with patient):  Patient Belongings Bag     RN SIGNATURE:  Electronically signed by Dyllan Douglas RN on 8/5/22 at 11:38 AM EDT    CASE MANAGEMENT/SOCIAL WORK SECTION    Inpatient Status Date: ***    Readmission Risk Assessment Score:  Readmission Risk              Risk of Unplanned Readmission:  [id number]           Discharging to Facility/ Agency   Name: [facility] [place]  Address:  Phone:  Fax:    Dialysis Facility (if applicable)   Name:  Address:  Dialysis Schedule:  Phone:  Fax:    / signature: Electronically signed by Daniel Black RN on 8/5/22 at 3:55 PM EDT    PHYSICIAN SECTION    Prognosis: Fair    Condition at Discharge: Stable    Rehab Potential (if transferring to Rehab): Fair    Recommended Labs or Other Treatments After Discharge:   Continue taking ertapenem 500mg for 5 more days for UTI  Continue taking all medications regularly  Continue using stool softners  If symptoms persist or worsen would recommend comingback to the ER    Physician Certification: I certify the above information and transfer of Brandan Camarena  is necessary for the continuing treatment of the diagnosis listed and that she requires Bob Haseeb for greater 30 days.      Update Admission H&P: No change in H&P    PHYSICIAN SIGNATURE: Electronically signed by Zaira Sanches MD on 8/5/2022 at 5:11 PM

## 2022-08-05 NOTE — PLAN OF CARE
Problem: Pain  Goal: Verbalizes/displays adequate comfort level or baseline comfort level  8/5/2022 0516 by Joi Chen RN  Outcome: Progressing  8/4/2022 1741 by Elliot Bundy RN  Outcome: Progressing     Problem: Safety - Adult  Goal: Free from fall injury  8/5/2022 0516 by Joi Chen RN  Outcome: Progressing  Flowsheets (Taken 8/4/2022 1937)  Free From Fall Injury: Instruct family/caregiver on patient safety  8/4/2022 1741 by Elliot Bundy RN  Outcome: Progressing     Problem: ABCDS Injury Assessment  Goal: Absence of physical injury  8/5/2022 0516 by Joi Chen RN  Outcome: Progressing  Flowsheets (Taken 8/4/2022 1937)  Absence of Physical Injury: Implement safety measures based on patient assessment  8/4/2022 1741 by Elliot Bundy RN  Outcome: Progressing     Problem: Skin/Tissue Integrity  Goal: Absence of new skin breakdown  Description: 1. Monitor for areas of redness and/or skin breakdown  2. Assess vascular access sites hourly  3. Every 4-6 hours minimum:  Change oxygen saturation probe site  4. Every 4-6 hours:  If on nasal continuous positive airway pressure, respiratory therapy assess nares and determine need for appliance change or resting period.   8/5/2022 0516 by Joi Chen RN  Outcome: Progressing  8/4/2022 1741 by Elliot Bundy RN  Outcome: Progressing

## 2022-08-05 NOTE — PLAN OF CARE
Problem: Pain  Goal: Verbalizes/displays adequate comfort level or baseline comfort level  8/5/2022 0516 by Sumit Worley RN  Outcome: Progressing     Problem: Safety - Adult  Goal: Free from fall injury  8/5/2022 0516 by Sumit Worley RN  Outcome: Progressing  Flowsheets (Taken 8/4/2022 1937)  Free From Fall Injury: Instruct family/caregiver on patient safety     Problem: ABCDS Injury Assessment  Goal: Absence of physical injury  8/5/2022 0516 by Sumit Worley RN  Outcome: Progressing  Flowsheets (Taken 8/4/2022 1937)  Absence of Physical Injury: Implement safety measures based on patient assessment     Problem: Skin/Tissue Integrity  Goal: Absence of new skin breakdown  Description: 1. Monitor for areas of redness and/or skin breakdown  2. Assess vascular access sites hourly  3. Every 4-6 hours minimum:  Change oxygen saturation probe site  4. Every 4-6 hours:  If on nasal continuous positive airway pressure, respiratory therapy assess nares and determine need for appliance change or resting period.   8/5/2022 0516 by Sumit Worley RN  Outcome: Progressing     Problem: Respiratory - Adult  Goal: Achieves optimal ventilation and oxygenation  8/5/2022 1508 by Lacho Pittman RCP  Outcome: Progressing

## 2022-08-05 NOTE — DISCHARGE INSTRUCTIONS
Continue taking ertapenem 500mg for 5 more days for UTI  Continue taking all medications regularly  Continue using stool softners  If symptoms persist or worsen would recommend comingback to the ER

## 2022-08-05 NOTE — CARE COORDINATION
Transitional Planning:  Call to [facility] re: discharge and to inform them that pt is discharged on [address]. Asked for call back to determine how late they can receive pt.    15:30  Spoke with Josy Lozano at [facility]. They can [facility] back [facility] [address]. [facility]. Josy Stager notified. Report # [phone]  Fax:  [phone] forms faxed to [address] [place] [address]. AAKASH ANDRE to complete ZARA. 16:35  455 Gosia Osborne faxed to [facility] with facesheet.

## 2022-08-05 NOTE — PROGRESS NOTES
[facility] - Progress Note  Today's Date and Time: 8/5/2022, 9:09 AM    Impression :   Recurrent UTI  BENEDICT  HTN  COPD  Constipation  Leucopenia    Recommendations:   Patient could be transferred to F. Please administer 500 mg Ertapenem daily x 5 days at the [facility]    Medical Decision Making/Summary/Discussion:8/5/2022     Carbapenem therapy for ESBL with Klebsiella  Infection Control Recommendations   [place] Precautions  Contact Isolation     Antimicrobial Stewardship Recommendations     Simplification of therapy  Targeted therapy    Coordination of Outpatient Care:   Estimated Length of IV antimicrobials:TBD  Patient will need Midline Catheter Insertion: No  Patient will need PICC line Insertion:No  Patient will need: Home IV , [place],  SNF,  LTAC: TBD  Patient will need outpatient wound care:No    Chief complaint/reason for consultation:   Urinary tract infection, concern for ESBL      History of Present Illness:   Carolee Berry is a 68y.o.-year-old  female who was initially admitted on 7/31/2022. Patient seen at the request of Dr. Nathalia Ledesma. INITIAL HISTORY:      Patient has a history of recurrent UTI, isolated left kidney and COPD. She was admitted with lower abdominal pain and increased urinary frequency, urgency and dysuria. Patient admits to worsening lower abdominal pain and difficulty urinating for the last 4 days and has been worsening since then. Patient has a fever spike of 101 on admission and blood pressure of 140/80. Labs showed no leukocytosis  UA his trace leukocyte Estrace, negative nitrates. Growing gram-negative rods greater than 100,000 CFU. CURRENT EVALUATION :8/5/2022    Afebrile  VS stable    Patient feels better  No complaints  No new issues per RN    On room air     Urinary symptoms improving. Urine is straw yellow this morning.    Patient states she still has abdominal discomfort of right upper quadrant that is improving. Patient states that she has been nauseous since arrival but has yet to vomit. Patient had not had a BM for 4 days. Enema administered yesterday 22, resulted in large hard stool. Patient feeling better. Patient has persistent cough without sputum. CT of abdomen and pelvis 8-3-22 with retained stool throughout. Absent Rt kidney. No abscess. Thickening of bowel wall in the rectum and sigmoid colon. Suggestive of proctocolitis. Leukopenia has not rebounded. WBC: 2.9. Will cinthya to monitor. Meropenem can be a potential cause. Labs, X rays reviewed: 2022    BUN: 17-> 16--> 10--> 9  Cr: 1.03-> 0.95.--> 0.85--> 0.82    WBC: 6.0-> 3.9--> 2.8-->2.8  Hb: 15.1-> 15.0-->12.5--> 12.9  Plat: 127--> 113--> 120    Ultrasound kidney:  Tiny 1.2 cm lower pole left renal cortical cyst.   No overt left-sided hydronephrosis. Left ureteral jet is visualized. Prior right nephrectomy. Cultures:  Urine:   22: Klebsiella pneumoniae greater than 100,000 CFU. ESBL +    Blood: No growth x2    Sputum:    Wound:      Imagin2022: CT abdomen/pelvis  Impression   Since prior examination, interval development of moderate wall thickening   within the rectum and sigmoid colon likely suggestive of proctocolitis. Status post cholecystectomy. Right kidney is not visualized. Axial hiatal hernia. Discussed with patient, RN, LIBIA. I have personally reviewed the past medical history, past surgical history, medications, social history, and family history, and I have updated the database accordingly.   Past Medical History:     Past Medical History:   Diagnosis Date    Anxiety     Arthritis     Back pain     Bronchitis     Caffeine use     8 coffee / day    Carpal tunnel syndrome     Cataracts, bilateral     Constipation     COPD (chronic obstructive pulmonary disease) (HCC)     Emphysema    Depression     Diarrhea     GERD (gastroesophageal reflux disease)     H/O gastric ulcer Hyperlipidemia     Hypertension     Mumps     MVP (mitral valve prolapse)     Dr. Bella Ulloa in May 2019    Recurrent UTI     Dr. Bryan Frazier    Sinusitis     Tracheostomy in place [facility])     Ulcerative esophagitis     Wellness examination     Dr. Sharon Villa seen in Jan 2020       Past Surgical  History:     Past Surgical History:   Procedure Laterality Date    APPENDECTOMY      CARPAL [address] [facility], LAPAROSCOPIC N/A 05/22/2020    XI LAPAROSCOPIC ROBOTIC CHOLECYSTECTOMY, PYLOROPLASTY performed by Lauryn Calvin MD at [address] [facility] N/A 1/28/2022    COLONOSCOPY POLYPECTOMY HOT performed by Lesly Samuels MD at [place] [address]      ERCP  05/21/2020    with stent insertion, balloon dilation, sphinctereotomy    ERCP  05/21/2020    ** CASE IN OR WITY GI STAFF** ERCP STENT INSERTION performed by Lesly Samuels MD at [facility]    ERCP  05/21/2020    ** CASE IN OR WITH GI STAFF**ERCP SPHINCTER/PAPILLOTOMY performed by Lesly Samuels MD at [facility]    ERCP  05/21/2020    ** CASE IN OR WITH GI STAFF**ERCP DILATION BALLOON performed by Lesly Samuels MD at [facility]    ERCP N/A 2/8/2021    ERCP STENT REMOVAL performed by Cassi Troy MD at [facility]    ERCP  2/8/2021    ERCP STONE REMOVAL performed by Cassi Troy MD at [address] [place] [address]    GASTRIC FUNDOPLICATION N/A 88/54/9882    XI LAPAROSCOPIC ROBOTIC [address], CHERYL FUNDOPLICATION performed by Lauryn Calvin MD at [address] 03/27/2020    EGD PEG TUBE PLACEMENT performed by Lauryn Calvin MD at [address] N/A 2/8/2021    **CASE IN O.R. W/ GI STAFF - EGD WITH REMOVAL PEG TUBE performed by Cassi Troy MD at [facility]    HAND SURGERY      [facility] [place]. CATH POWER PICC TRIPLE  03/04/2020         HERNIA REPAIR      Hiatal hernia    HYSTERECTOMY (CERVIX STATUS UNKNOWN)      Abdominal    IR NONTUNNELED VASCULAR CATHETER  5/10/2022    IR NONTUNNELED VASCULAR CATHETER 5/10/2022 Fermin Padilla MD [facility]    JOINT REPLACEMENT Right     right hip    OVARY REMOVAL      RHINOPLASTY      TONSILLECTOMY      TOTAL HIP ARTHROPLASTY      TOTAL NEPHRECTOMY      atrophic from infections, age 13    TRACHEOSTOMY N/A 03/27/2020    **ADD ON **WANTS 10:00AM **TRACHEOTOMY performed by Ry Brunson MD at [address] [facility] N/A 04/02/2020    TRACHEOTOMY EXCHANGE performed by Ry Brunson MD at [address] [facility] 03/17/2020    BEDSIDE EGD ESOPHAGOGASTRODUODENOSCOPY (ICU) performed by Ry Brunson MD at [address] [place] [address] 05/18/2020    EGD BIOPSY performed by Belgica Pablo MD at [address] [place] [address]  05/18/2020    EGD DILATION BALLOON performed by Belgica Pablo MD at [address] [place] [address] N/A 07/06/2020    ** CASE IN O.R. WITH GI STAFF** EGD ESOPHAGOGASTRODUODENOSCOPY performed by Kyle Rojas MD at [address] [place] [address] 11/09/2020    EGD DIAGNOSTIC ONLY performed by Miky Barrera MD at [address] [place] [address]  02/08/2021    - EGD WITH REMOVAL PEG TUBE,ERCP STENT REMOVAL,ERCP STONE REMOVAL    UPPER GASTROINTESTINAL ENDOSCOPY N/A 11/24/2021    EGD BIOPSY performed by Miky Barrera MD at [facility]       Medications:      enoxaparin  40 mg SubCUTAneous Daily    polyethylene glycol  17 g Oral 4x Daily    gabapentin  300 mg Oral TID    clonazePAM  0.5 mg Oral Q12H    pantoprazole  40 mg Oral QAM AC    tetrahydrozoline  2 drop Both Eyes TID    meropenem  1,000 mg IntraVENous Q8H    bisacodyl  10 mg Rectal BID    alteplase  1 mg IntraCATHeter Once    QUEtiapine  100 mg Oral Nightly    traZODone  100 mg Oral Nightly    budesonide-formoterol  2 puff Inhalation BID    busPIRone  10 mg Oral BID escitalopram  10 mg Oral Daily    metoclopramide  5 mg Oral 4x Daily    atorvastatin  20 mg Oral Daily    sodium chloride flush  5-40 mL IntraVENous 2 times per day    metoprolol succinate  25 mg Oral Daily    senna  2 tablet Oral Nightly       Social History:     Social History     Socioeconomic History    Marital status:      Spouse name: Not on file    Number of children: 2    Years of education: Not on file    Highest education level: Not on file   Occupational History    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   Tobacco Use    Smoking status: Former     Packs/day: 1.00     Years: 50.00     Pack years: 50.00     Types: Cigarettes    Smokeless tobacco: Never    Tobacco comments:     quit 1 year ago    Vaping Use    Vaping Use: Former    Substances: Always   Substance and Sexual Activity    Alcohol use: No    Drug use: No    Sexual activity: Never   Other Topics Concern    Not on file   Social History Narrative    Not on file     Social Determinants of Health     Financial Resource Strain: Not on file   Food Insecurity: Not on file   Transportation Needs: Not on file   Physical Activity: Not on file   Stress: Not on file   Social Connections: Not on file   Intimate Partner Violence: Not on file   Housing Stability: Not on file       Family History:     Family History   Problem Relation Age of Onset    Cancer Mother         uterine    Heart Attack Mother     Heart Attack Father     Other Maternal Grandfather         foot gangrene    Other Paternal Grandmother         bleeding ulcers    Other Paternal Grandfather         lung cancer        Allergies:   Prednisone, Compazine [prochlorperazine maleate], Penicillin v potassium, and Penicillins     Review of Systems:   Constitutional: No fevers or chills. No systemic complaints  Head: No headaches  Eyes: No double vision or blurry vision. No conjunctival inflammation. ENT: No sore throat or runny nose. No hearing loss, tinnitus or vertigo.   Cardiovascular: No chest pain or palpitations. No shortness of breath. No HOLLAND  Lung: No shortness of breath or cough. No sputum production  Abdomen: No nausea, vomiting, diarrhea,. Upper abdominal pain. Genitourinary: No increased urinary frequency, or dysuria. No hematuria. No suprapubic or CVA pain  Musculoskeletal: No muscle aches or pains. No joint effusions, swelling or deformities  Hematologic: No bleeding or bruising. Neurologic: No headache, weakness, numbness, or tingling. Integument: No rash, no ulcers. Psychiatric: No depression. Endocrine: No polyuria, no polydipsia, no polyphagia. Physical Examination :   No data found. General Appearance: Awake, alert, and in no apparent distress  Head:  Normocephalic, no trauma  Eyes: Pupils equal, round, reactive to light and accommodation; extraocular movements intact; sclera anicteric; conjunctivae pink. No embolic phenomena. ENT: Oropharynx clear, without erythema, exudate, or thrush. No tenderness of sinuses. Mouth/throat: mucosa pink and moist. No lesions. Dentition in good repair. Neck:Supple, without lymphadenopathy. Thyroid normal, No bruits. Pulmonary/Chest: Clear to auscultation, without wheezes, rales, or rhonchi. No dullness to percussion. Cardiovascular: Regular rate and rhythm without murmurs, rubs, or gallops. Abdomen: Tender to touch RUQ. Bowel sounds normal. No organomegaly  All four Extremities: No cyanosis, clubbing, edema, or effusions. Neurologic: No gross sensory or motor deficits. Skin: Warm and dry with good turgor. No signs of peripheral arterial or venous insufficiency. No ulcerations. No open wounds.     Medical Decision Making -Laboratory:   I have independently reviewed/ordered the following labs:    CBC with Differential:   Recent Labs     08/03/22  0354 08/03/22  1621 08/04/22  0738 08/04/22  1423   WBC 2.8*  --  2.9*  --    HGB 12.5   < > 12.9 13.1   HCT 40.4   < > 40.9 39.8   PLT 97*  --  See Reflexed IPF Result  --    LYMPHOPCT 15*  --  21*  -- MONOT 6  --  13*  --     < > = values in this interval not displayed. BMP:   Recent Labs     08/03/22  0354 08/04/22  0738   * 135   K 3.6* 4.2    101   CO2 21 24   BUN 10 9   CREATININE 0.85 0.82     Hepatic Function Panel:   No results for input(s): PROT, LABALBU, BILIDIR, IBILI, BILITOT, ALKPHOS, ALT, AST in the last 72 hours. No results for input(s): RPR in the last 72 hours. No results for input(s): HIV in the last 72 hours. No results for input(s): BC in the last 72 hours. Lab Results   Component Value Date/Time    MUCUS NOT REPORTED 12/18/2021 12:26 PM    RBC 4.47 08/04/2022 07:38 AM    TRICHOMONAS NOT REPORTED 12/18/2021 12:26 PM    WBC 2.9 08/04/2022 07:38 AM    YEAST NOT REPORTED 12/18/2021 12:26 PM    TURBIDITY Clear 07/31/2022 03:53 PM     Lab Results   Component Value Date/Time    CREATININE 0.82 08/04/2022 07:38 AM    GLUCOSE 93 08/04/2022 07:38 AM       Medical Decision Making-Imaging:       Medical Decision Easjay-Xftkocew-Edrpm:       Contains abnormal data Culture, Urine  Order: [id number]  Status: Final result    Visible to patient: No (not released)    Next appt: None    Specimen Information: Urine, straight catheter        0 Result Notes    Component 7/31/22 6083    Specimen Description . URINE,STRAIGHT CATHETER    Culture  Abnormal   KLEBSIELLA PNEUMONIAE >690958 CFU/ML This organism is an Extended Spectrum Beta Lactamase (ESBL)  and resistance to therapy with Penicillins, Cephalosporins and Aztreonam is expected. These organisms generally remain susceptible to Carbapenems. Consider ID Consultation. CONTACT PRECAUTIONS INDICATED.      Raquel Page 30              Specimen Collected: 07/31/22 15:53 EDT Last Resulted: 08/03/22 00:27 EDT             Medical Decision Making-Other:     Note:  Labs, medications, radiologic studies were reviewed with personal review of films  Moderate Large amounts of data were reviewed  Discussed with nursing Staff, Discharge planner  Infection Control and Prevention measures reviewed  All prior entries were reviewed  Administer medications as ordered  Prognosis: Good  Discharge planning reviewed  Follow up as outpatient. Thank you for allowing us to participate in the care of this patient. Please call with questions. Beth Rizo DPM      ATTESTATION:    I have discussed the case, including pertinent history and exam findings with the residents and students. I have seen and examined the patient and the key elements of the encounter have been performed by me. I was present when the student obtained his information or examined the patient. I have reviewed the laboratory data, other diagnostic studies and discussed them with the residents. I have updated the medical record where necessary. I agree with the assessment, plan and orders as documented by the resident/ student.     Ayan Bowling MD.    8/5/2022,9:09 AM Dentures/Eyeglasses

## 2022-08-05 NOTE — PLAN OF CARE
Problem: Pain  Goal: Verbalizes/displays adequate comfort level or baseline comfort level  8/5/2022 1727 by Humberto Arteaga RN  Outcome: Completed  8/5/2022 0516 by Winifred Ruiz RN  Outcome: Progressing     Problem: Safety - Adult  Goal: Free from fall injury  8/5/2022 1727 by Humberto Arteaga RN  Outcome: Completed  8/5/2022 0516 by Winifred Ruiz RN  Outcome: Progressing  Flowsheets (Taken 8/4/2022 1937)  Free From Fall Injury: Instruct family/caregiver on patient safety     Problem: ABCDS Injury Assessment  Goal: Absence of physical injury  8/5/2022 1727 by Humberto Arteaga RN  Outcome: Completed  8/5/2022 0516 by Winifred Ruiz RN  Outcome: Progressing  Flowsheets (Taken 8/4/2022 1937)  Absence of Physical Injury: Implement safety measures based on patient assessment     Problem: Skin/Tissue Integrity  Goal: Absence of new skin breakdown  Description: 1. Monitor for areas of redness and/or skin breakdown  2. Assess vascular access sites hourly  3. Every 4-6 hours minimum:  Change oxygen saturation probe site  4. Every 4-6 hours:  If on nasal continuous positive airway pressure, respiratory therapy assess nares and determine need for appliance change or resting period.   8/5/2022 1727 by Humberto Arteaga RN  Outcome: Completed  8/5/2022 0516 by Winifred Ruiz RN  Outcome: Progressing     Problem: Respiratory - Adult  Goal: Achieves optimal ventilation and oxygenation  8/5/2022 1727 by Humberto Arteaga RN  Outcome: Completed  8/5/2022 1508 by Lamar Lanes, RCP  Outcome: Progressing

## 2022-08-05 NOTE — PROCEDURES
Product type Bard 3 Fr single lumen PowerMidline  History/Labs/Allergies Reviewed  Placed ByM. John King RN  Assisted ByBRAD  Time out Performed using Two Identifiers  Lot # [id number]  Expiration date 4/30/2023  Catheter size 3  Syrian  Trimmed at 12 cm  Total length inserted 11cm  External catheter length 1 cm  Location left cephalic vein  Number of attempts 1  Estimated blood loss 1 ml  Placement verified by- positive blood return & flushes easily  Special equipment used- ultrasound & micro-introducer technique   Catheter secured with statlock  Dressing applied- Tegaderm CHG  Lidocaine administered intradermally conc.1% 1 mL  Midline education:   [ x ] Discussed with patient/Family or POA prior to procedure. Risks and Benefits along with reason for procedure were discussed and teaching was reinforced with an education handout on Midline insertion. [facility] FAQ Catheter Associated Blood Stream Infections and [address] [facility] [address] REV. 7/13 Nursing and Booklet left at bedside or in chart. Patient (Family or POA) acknowledged understanding of information taught and agreed to procedure. [  ] Was not discussed with patient/family or POA due to pts medical status at time of procedure. pts family or POA not available to discuss Midline education.  [facility] FAQ Catheter Associated Blood Stream Infections and [address] [facility] REV. 7/13 Nursing and Booklet left at bedside or in chart    Court Antonio RN

## 2022-08-09 NOTE — DISCHARGE SUMMARY
[address]     Department of Internal Medicine - Staff Internal Medicine Teaching Service    INPATIENT DISCHARGE SUMMARY      Patient Identification:  Natalia Boyer is a 68 y.o. female. :  1945  MRN: [id number]     Acct: [de-identified]   PCP: No primary care provider on file. Admit Date:  2022  Discharge date and time: 2022  5:31 PM   Attending Provider: No att. providers found                                     [address] Problem Lists:  Principal Problem:    UTI (urinary tract infection)  Active Problems:    ESBL (extended spectrum beta-lactamase) producing bacteria infection    Complicated UTI (urinary tract infection)    Depression    Anxiety    BENEDICT (acute kidney injury) ([facility] [address]    Essential hypertension    Tobacco abuse    Coffee ground emesis    Septicemia (HCC)    Stage 3 chronic kidney disease ([facility] [address]  Resolved Problems:    * No resolved hospital problems. *      HOSPITAL STAY     Brief Inpatient course:   Natalia Boyer is a 68 y.o. female who was admitted for the management of UTI (urinary tract infection), presented to the emergency department with complains of abdominal pain and generalized body aches. Patient was febrile at the presentation. Patient has past medical history of recurrent UTIs since 13years old, she also has solitary kidney. Her urine analysis suggested UTI, urine culture was done which showed Klebsiella pneumonia. she was started on meropenem patient had 1 episode of coffee-ground hematemesis, GI was consulted. Patient also has a history of GERD and heartburn. GI recommended to continue IV pantoprazole 40 Mg twice daily and then changed to oral once daily ,monitor hemoglobin and hematocrit, if there is a downward trend in hemoglobin and hematocrit, they will do endoscopy.   Hemoglobin and hematocrit remained stable throughout her course in the hospital.  Also complained of having abdominal pain, CT scan of the abdomen was done which showed proctocolitis. Patient improved later on, and was discharged on ertapenem IV. Procedures/ Significant Interventions:    None    Consults:     Consults:     Final Specialist Recommendations/Findings:   IP CONSULT TO INTERNAL MEDICINE  IP CONSULT TO CASE MANAGEMENT  IP CONSULT TO GI  IP CONSULT TO GI  IP CONSULT TO INFECTIOUS DISEASES  IP CONSULT TO GI      Any Hospital Acquired Infections: none    Discharge Functional Status:  stable    DISCHARGE PLAN     Disposition: [facility]    Patient Instructions:   Discharge Medication List as of 8/5/2022  4:33 PM        START taking these medications    Details   ondansetron (ZOFRAN ODT) 4 MG disintegrating tablet Take 1 tablet by mouth every 8 hours as needed for Nausea or Vomiting, Disp-90 tablet, R-0Normal      ertapenem (INVANZ) infusion Infuse 1,000 mg intravenously every 24 hours for 5 days Compound per protocol., Disp-5 g, R-0DC to SNF           CONTINUE these medications which have NOT CHANGED    Details   carboxymethylcellulose 1 % ophthalmic solution Place 1 drop into both eyes in the morning and 1 drop at noon and 1 drop in the evening and 1 drop before bedtime. Historical Med      albuterol (PROVENTIL) (2.5 MG/3ML) 0.083% nebulizer solution Take 2.5 mg by nebulization every 6 hours as needed for WheezingHistorical Med      Dyphylline-guaiFENesin 100-100 MG/5ML LIQD Take 5 mLs by mouth every 4 hours as neededHistorical Med      magnesium hydroxide (MILK OF MAGNESIA) 400 MG/5ML suspension Take 30 mLs by mouth Give 30ml by mouth as needed for constipation no BM x3 daysHistorical Med      acetaminophen (TYLENOL) 500 MG tablet Take 1,000 mg by mouth in the morning and at bedtimeHistorical Med      ondansetron (ZOFRAN) 4 MG tablet Take 4 mg by mouth in the morning and 4 mg at noon and 4 mg in the evening and 4 mg before bedtime. Historical Med      clotrimazole-betamethasone (LOTRISONE) 1-0.05 % cream Apply topically 2 times daily Apply topically 2 times daily. , Topical, 2 TIMES DAILY Starting Tue 5/10/2022, Disp-15 g, R-0, Normal      Cholecalciferol (VITAMIN D) 50 MCG (2000 UT) CAPS capsule Take 1 capsule by mouth daily, Disp-30 capsule, R-0Normal      guaiFENesin (MUCINEX) 600 MG extended release tablet Take 400 mg by mouth in the morning. Indications: Cough. As needed for mucus. Historical Med      Lactobacillus TABS Take 1 tablet by mouth 2 times daily Take One tablet by mouth two times dailyHistorical Med      fluticasone (FLONASE) 50 MCG/ACT nasal spray 1 spray by Each Nostril route dailyHistorical Med      loratadine (CLARITIN) 10 MG capsule Take 10 mg by mouth dailyHistorical Med      clonazePAM (KLONOPIN) 0.5 MG tablet Take 0.5 mg by mouth 2 times daily as needed. Historical Med      bisacodyl (DULCOLAX) 5 MG EC tablet TAKE 2 TABS AS INSTRUCTED BY THE OFFICE THE DAY PRIOR TO COLONOSCOPY, Disp-2 tablet, R-0Print      metoclopramide (REGLAN) 5 MG tablet Take 1 tablet by mouth 4 times daily, Disp-120 tablet, R-3Print      polyethylene glycol (GLYCOLAX) 17 g packet Take 17 g by mouth daily as needed for Constipation, Disp-527 g, R-1Normal      QUEtiapine (SEROQUEL) 100 MG tablet Take 1 tablet by mouth nightly for 3 days, Disp-3 tablet, R-0Print      QUEtiapine (SEROQUEL) 25 MG tablet Take 1 tablet by mouth every morning (before breakfast) for 3 days, Disp-3 tablet, R-0Print      gabapentin (NEURONTIN) 300 MG capsule Take 1 capsule by mouth 3 times daily for 3 days. , Disp-9 capsule, R-0Print      escitalopram (LEXAPRO) 10 MG tablet Take 20 mg by mouth in the morning. Historical Med      Folic Acid-Cholecalciferol 1-2000 MG-UNIT TABS Take by mouthHistorical Med      busPIRone (BUSPAR) 10 MG tablet Take 1 tablet by mouth 2 times daily, R-0DC to SNF      pantoprazole (PROTONIX) 40 MG tablet Take 1 tablet by mouth 2 times daily, Disp-60 tablet, R-0Normal      traZODone (DESYREL) 100 MG tablet Take 1 tablet by mouth nightly for 7 days, Disp-7 tablet, R-0Print      bisacodyl (DULCOLAX) 10 MG suppository Place 10 mg rectally daily as needed for Constipation PRN for constipation no BM x4 days. Historical Med      Sodium Phosphates (FLEET) 7-19 GM/118ML Place 1 enema rectally As needed after no BM for 5 daysHistorical Med      budesonide-formoterol (SYMBICORT) 160-4.5 MCG/ACT AERO Inhale 2 puffs into the lungs 2 times dailyHistorical Med      ferrous sulfate (FE TABS 325) 325 (65 Fe) MG EC tablet Take 1 tablet by mouth daily (with breakfast), Disp-90 tablet, R-3Print      amitriptyline (ELAVIL) 25 MG tablet Take 1 tablet by mouth nightlyDC to SNF      metoprolol succinate (TOPROL XL) 25 MG extended release tablet Take 1 tablet by mouth daily, Disp-30 tablet, R-3DC to SNF      sucralfate (CARAFATE) 1 GM tablet Take 1 tablet by mouth 4 times daily, Disp-120 tablet, R-3Normal      Oyster Shell 500 MG TABS Take 500 mg by mouth 2 times daily Historical Med      calcium carbonate (TUMS) 500 MG chewable tablet Take 1 tablet by mouth 3 times daily as needed for HeartburnHistorical Med      simvastatin (ZOCOR) 40 MG tablet Take 40 mg by mouth nightly. Historical Med           STOP taking these medications       GUAIFENESIN PO Comments:   Reason for Stopping:               Activity: activity as tolerated    Diet: regular diet    Follow-up:    NILDA [address]  [address] [facility]  [phone]  Follow up        Patient Instructions: Continue taking ertapenem 500mg for 5 more days for UTI  Continue taking all medications regularly  Continue using stool softners  If symptoms persist or worsen would recommend comingback to the ER  Follow up labs: none  Follow up imaging: None    Note that over 30 minutes was spent in preparing discharge papers, discussing discharge with patient, medication review, etc.      Warden Pradeep MD, MD  Internal Medicine Resident, PGY-1  [facility],  [place], [place].  8/9/2022, 12:02 AM

## 2022-08-15 NOTE — PROGRESS NOTES
Physician Progress Note      Lois Osuna  CSN #:                  [id number]  :                       1945  ADMIT DATE:       2022 2:20 PM  [address] [place] DATE:        2022 5:31 PM  RESPONDING  PROVIDER #:        Ayesha Royal          QUERY TEXT:    Pt admitted with UTI. 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 GI consult was requested with documentation of   \"suspect recurrence of Sarah-Abrams tear. \" The patient was treated with IV   Protonix and discharged to continue home dosage of Protonix 40 mg daily. Treatment: CT abdomen, GI consult, IV Protonix changed to PO for discharge,   labs, monitoring    Thank you, Jose Rafael Page, CDI  email - [email or web address]  cell- [phone]  office hours M---3P  Options provided:  -- recurrence of Sarah-Abrams tear  -- documentation of suspected recurrence of Sarah-Abrams tear is not   clinically significant  -- Other - I will add my own diagnosis  -- Disagree - Not applicable / Not valid  -- Disagree - Clinically unable to determine / Unknown  -- Refer to Clinical Documentation Reviewer    PROVIDER RESPONSE TEXT:    Provider is clinically unable to determine a response to this query.     Query created by: Bertram Sanchez on 8/15/2022 9:11 AM      Electronically signed by:  Ayesha Royal 8/15/2022 9:47 AM

## 2022-08-17 ENCOUNTER — APPOINTMENT (OUTPATIENT)
Dept: CT IMAGING | Age: 77
End: 2022-08-17
Payer: MEDICARE

## 2022-08-17 ENCOUNTER — HOSPITAL ENCOUNTER (EMERGENCY)
Age: 77
Discharge: HOME | End: 2022-08-17
Attending: EMERGENCY MEDICINE
Payer: MEDICARE

## 2022-08-17 VITALS
DIASTOLIC BLOOD PRESSURE: 114 MMHG | SYSTOLIC BLOOD PRESSURE: 155 MMHG | TEMPERATURE: 98.2 F | OXYGEN SATURATION: 95 % | RESPIRATION RATE: 14 BRPM | HEART RATE: 76 BPM

## 2022-08-17 DIAGNOSIS — R10.30 LOWER ABDOMINAL PAIN: Primary | ICD-10-CM

## 2022-08-17 DIAGNOSIS — K20.90 ESOPHAGITIS: ICD-10-CM

## 2022-08-17 LAB
ABSOLUTE EOS #: 0.31 K/UL (ref 0–0.44)
ABSOLUTE IMMATURE GRANULOCYTE: 0.03 K/UL (ref 0–0.3)
ABSOLUTE LYMPH #: 1.91 K/UL (ref 1.1–3.7)
ABSOLUTE MONO #: 0.92 K/UL (ref 0.1–1.2)
ALBUMIN SERPL-MCNC: 4.2 G/DL (ref 3.5–5.2)
ALBUMIN/GLOBULIN RATIO: 1.5 (ref 1–2.5)
ALP BLD-CCNC: 116 U/L (ref 35–104)
ALT SERPL-CCNC: 31 U/L (ref 5–33)
ANION GAP SERPL CALCULATED.3IONS-SCNC: 12 MMOL/L (ref 9–17)
AST SERPL-CCNC: 31 U/L
BASOPHILS # BLD: 1 % (ref 0–2)
BASOPHILS ABSOLUTE: 0.05 K/UL (ref 0–0.2)
BILIRUB SERPL-MCNC: 0.42 MG/DL (ref 0.3–1.2)
BILIRUBIN URINE: NEGATIVE
BUN BLDV-MCNC: 10 MG/DL (ref 8–23)
CALCIUM SERPL-MCNC: 9.8 MG/DL (ref 8.6–10.4)
CHLORIDE BLD-SCNC: 104 MMOL/L (ref 98–107)
CO2: 24 MMOL/L (ref 20–31)
COLOR: YELLOW
CREAT SERPL-MCNC: 0.91 MG/DL (ref 0.5–0.9)
EOSINOPHILS RELATIVE PERCENT: 5 % (ref 1–4)
EPITHELIAL CELLS UA: ABNORMAL /HPF (ref 0–5)
GFR AFRICAN AMERICAN: >60 ML/MIN
GFR NON-AFRICAN AMERICAN: >60 ML/MIN
GFR SERPL CREATININE-BSD FRML MDRD: ABNORMAL ML/MIN/{1.73_M2}
GLUCOSE BLD-MCNC: 92 MG/DL (ref 70–99)
GLUCOSE URINE: NEGATIVE
HCT VFR BLD CALC: 42.6 % (ref 36.3–47.1)
HEMOGLOBIN: 13.9 G/DL (ref 11.9–15.1)
IMMATURE GRANULOCYTES: 1 %
KETONES, URINE: NEGATIVE
LACTIC ACID, WHOLE BLOOD: 1.8 MMOL/L (ref 0.7–2.1)
LEUKOCYTE ESTERASE, URINE: ABNORMAL
LIPASE: 19 U/L (ref 13–60)
LYMPHOCYTES # BLD: 32 % (ref 24–43)
MAGNESIUM: 1.7 MG/DL (ref 1.6–2.6)
MCH RBC QN AUTO: 29.1 PG (ref 25.2–33.5)
MCHC RBC AUTO-ENTMCNC: 32.6 G/DL (ref 28.4–34.8)
MCV RBC AUTO: 89.3 FL (ref 82.6–102.9)
MONOCYTES # BLD: 15 % (ref 3–12)
NITRITE, URINE: NEGATIVE
NRBC AUTOMATED: 0 PER 100 WBC
PDW BLD-RTO: 14.6 % (ref 11.8–14.4)
PH UA: 6.5 (ref 5–8)
PLATELET # BLD: 202 K/UL (ref 138–453)
PMV BLD AUTO: 11.2 FL (ref 8.1–13.5)
POTASSIUM SERPL-SCNC: 3.9 MMOL/L (ref 3.7–5.3)
PROTEIN UA: NEGATIVE
RBC # BLD: 4.77 M/UL (ref 3.95–5.11)
RBC # BLD: ABNORMAL 10*6/UL
RBC UA: ABNORMAL /HPF (ref 0–4)
SEG NEUTROPHILS: 46 % (ref 36–65)
SEGMENTED NEUTROPHILS ABSOLUTE COUNT: 2.84 K/UL (ref 1.5–8.1)
SODIUM BLD-SCNC: 140 MMOL/L (ref 135–144)
SPECIFIC GRAVITY UA: 1.02 (ref 1–1.03)
TOTAL PROTEIN: 7 G/DL (ref 6.4–8.3)
TURBIDITY: CLEAR
URINE HGB: NEGATIVE
UROBILINOGEN, URINE: NORMAL
WBC # BLD: 6.1 K/UL (ref 3.5–11.3)
WBC UA: ABNORMAL /HPF (ref 0–5)

## 2022-08-17 PROCEDURE — 6360000002 HC RX W HCPCS: Performed by: EMERGENCY MEDICINE

## 2022-08-17 PROCEDURE — 99285 EMERGENCY DEPT VISIT HI MDM: CPT

## 2022-08-17 PROCEDURE — 96374 THER/PROPH/DIAG INJ IV PUSH: CPT

## 2022-08-17 PROCEDURE — 81001 URINALYSIS AUTO W/SCOPE: CPT

## 2022-08-17 PROCEDURE — 96376 TX/PRO/DX INJ SAME DRUG ADON: CPT

## 2022-08-17 PROCEDURE — 83735 ASSAY OF MAGNESIUM: CPT

## 2022-08-17 PROCEDURE — 83690 ASSAY OF LIPASE: CPT

## 2022-08-17 PROCEDURE — 87086 URINE CULTURE/COLONY COUNT: CPT

## 2022-08-17 PROCEDURE — 6360000004 HC RX CONTRAST MEDICATION: Performed by: STUDENT IN AN ORGANIZED HEALTH CARE EDUCATION/TRAINING PROGRAM

## 2022-08-17 PROCEDURE — 2580000003 HC RX 258: Performed by: STUDENT IN AN ORGANIZED HEALTH CARE EDUCATION/TRAINING PROGRAM

## 2022-08-17 PROCEDURE — 6360000002 HC RX W HCPCS: Performed by: STUDENT IN AN ORGANIZED HEALTH CARE EDUCATION/TRAINING PROGRAM

## 2022-08-17 PROCEDURE — 74177 CT ABD & PELVIS W/CONTRAST: CPT

## 2022-08-17 PROCEDURE — 85025 COMPLETE CBC W/AUTO DIFF WBC: CPT

## 2022-08-17 PROCEDURE — 80053 COMPREHEN METABOLIC PANEL: CPT

## 2022-08-17 PROCEDURE — 83605 ASSAY OF LACTIC ACID: CPT

## 2022-08-17 RX ORDER — FENTANYL CITRATE 50 UG/ML
50 INJECTION, SOLUTION INTRAMUSCULAR; INTRAVENOUS ONCE
Status: COMPLETED | OUTPATIENT
Start: 2022-08-17 | End: 2022-08-17

## 2022-08-17 RX ORDER — FENTANYL CITRATE 50 UG/ML
25 INJECTION, SOLUTION INTRAMUSCULAR; INTRAVENOUS ONCE
Status: COMPLETED | OUTPATIENT
Start: 2022-08-17 | End: 2022-08-17

## 2022-08-17 RX ORDER — 0.9 % SODIUM CHLORIDE 0.9 %
1000 INTRAVENOUS SOLUTION INTRAVENOUS ONCE
Status: COMPLETED | OUTPATIENT
Start: 2022-08-17 | End: 2022-08-17

## 2022-08-17 RX ADMIN — IOPAMIDOL 75 ML: 755 INJECTION, SOLUTION INTRAVENOUS at 19:08

## 2022-08-17 RX ADMIN — SODIUM CHLORIDE 1000 ML: 9 INJECTION, SOLUTION INTRAVENOUS at 18:16

## 2022-08-17 RX ADMIN — FENTANYL CITRATE 25 MCG: 50 INJECTION, SOLUTION INTRAMUSCULAR; INTRAVENOUS at 18:40

## 2022-08-17 RX ADMIN — FENTANYL CITRATE 50 MCG: 50 INJECTION, SOLUTION INTRAMUSCULAR; INTRAVENOUS at 21:07

## 2022-08-17 ASSESSMENT — PAIN SCALES - GENERAL: PAINLEVEL_OUTOF10: 8

## 2022-08-17 ASSESSMENT — PAIN - FUNCTIONAL ASSESSMENT: PAIN_FUNCTIONAL_ASSESSMENT: 0-10

## 2022-08-17 ASSESSMENT — PAIN DESCRIPTION - LOCATION: LOCATION: ABDOMEN

## 2022-08-17 NOTE — ED PROVIDER NOTES
I performed a history and physical examination of the patient and discussed management with the resident. I reviewed the residents note and agree with the documented findings and plan of care. Any areas of disagreement are noted on the chart. I was personally present for the key portions of any procedures. I have documented in the chart those procedures where I was not present during the key portions. I have reviewed the emergency nurses triage note. I agree with the chief complaint, past medical history, past surgical history, allergies, medications, social and family history as documented unless otherwise noted below. Documentation of the HPI, Physical Exam and Medical Decision Making performed by medical students or scribes is based on my personal performance of the HPI, PE and MDM. For Phys Assistant/ Nurse Practitioner cases/documentation I have personally evaluated this patient and have completed at least one if not all key elements of the E/M (history, physical exam, and MDM). I find the patient's history and physical exam are consistent with the NP/PA documentation. I agree with the care provided, treatment rendered, disposition and followup plan. Additional findings are as noted. José Kael. George Valdez MD  Attending Emergency  Physician      This patient presents to the emergency department today by EMS from an extended care facility with complaints of recurrent and persistent abdominal discomfort. She has a history of recurrent ESBL related urinary tract infections for which she was admitted approximately 2 and half weeks ago. She reports her abdominal discomfort began shortly after she was discharged from that admission. She also reports 2 episodes of watery diarrhea every morning also for the past 1 and half to 2 weeks. She denies any fevers or chills. No melena, hematochezia, hematemesis. Denies any dysuria or hematuria. No chest pain or difficulty breathing. Has had nausea but no emesis. She reports decreased oral intake of solids but does admit to taking fluids appropriately. No abnormal vaginal discharge or bleeding. Patient has a history of previous cholecystectomy, appendectomy, and hysterectomy. Awake, alert, coop, responsive. Speech fluent, normal comprehension. Lungs clear bilaterally. No rales, rhonchi, wheezes, stridor, retractions. No CVA tenderness. Cardiac-S1S2, RRR, no murmur, rub, or gallop. Abdomen soft, nondistended, with diffuse tenderness maximally in the right mid abdomen. There are some voluntary guarding but no rebound. No organomegaly, mass, bruit. Hypoactive bowel sounds. Hypertympanic to percussion. Impression: Abdominal discomfort with daily diarrhea. Plan: We will obtain blood work and CT of the abdomen. We will provide IV fluids and analgesia and reassess. Braden Garcia MD  08/17/22 1833      Patient's lab results and the radiologist to rotation of the CT of the abdomen have been reviewed. There are no clinically significant or diagnostic abnormalities noted. Will provide 1 additional dose for analgesia and likely discharge the patient with instructions to follow-up with her primary care provider as soon as possible and to return to the emergency department should her symptoms worsen or progress. Braden Garcia MD  08/17/22 2039      I had a relatively long and detailed discussion with the patient and her son regarding her presentation as well as the results of her evaluation. I indicated that at worst it appeared that she might have some mild esophagitis which she has had in the past.  I suggested that she continue the pantoprazole which she has been on. I also reassured her that all her other tests were for the most part unremarkable including her urinalysis. Patient does disclose significant history of anxiety and describes symptoms of hyperventilation.   We did discuss consequences of anxiety and hyperventilation as well as the possible response to that state. Advised her to follow-up with the gastroenterologist and suggested that her primary caregiver at the nursing home would need  to make that referral.  I also invited her to return to the emergency department should her symptoms worsen or progress in any way. At discharge the patient appears more anxious than anything although I was able to address that with some improvement.      Jamshid Kaufman MD  08/17/22 [id number]

## 2022-08-17 NOTE — ED PROVIDER NOTES
[facility]  ED  Emergency Department Encounter  EmergencyMedicine Resident     Pt Name:Erin Hernandez  MRN: [id number]  [place] 1945  Date of evaluation: 8/17/22  PCP:  No primary care provider on file. This patient was evaluated in the Emergency Department for symptoms described in the history of present illness. The patient was evaluated in the context of the global COVID-19 pandemic, which necessitated consideration that the patient might be at risk for infection with the SARS-CoV-2 virus that causes COVID-19. Institutional protocols and algorithms that pertain to the evaluation of patients at risk for COVID-19 are in a state of rapid change based on information released by regulatory bodies including the CDC and federal and state organizations. These policies and algorithms were followed during the patient's care in the ED. CHIEF COMPLAINT       Chief Complaint   Patient presents with    Abdominal Pain     Recent UTI. Treated with antibiotics. Diarrhea since start of antibiotics    Diarrhea       HISTORY OF PRESENT ILLNESS  (Location/Symptom, Timing/Onset, Context/Setting, Quality, Duration, Modifying Factors, Severity.)      Tigist Jane is a 68 y.o. female who presents with abdominal pain. Patient was discharged about a week ago after a weeklong admission where she was treated for a UTI, urosepsis. Patient was discharged with IV antibiotics. PICC line. Patient completed a course of antibiotics, PICC line was removed. Patient was experiencing diarrhea during her course of antibiotics. Patient states diarrhea stopped today. Patient is stating that her abdominal pain is worse. She is not have any fevers or chills no chest pain or shortness of breath. Patient does not have any blood in her bowel movements. Patient is otherwise afebrile, nontoxic-appearing, complaining of generalized abdominal pain. Patient does have a history of recurrent UTIs.   UTI require carbapenems to treat as she has been resistant strains of various bacteria. PAST MEDICAL / SURGICAL / SOCIAL / FAMILY HISTORY      has a past medical history of Anxiety, Arthritis, Back pain, Bronchitis, Caffeine use, Carpal tunnel syndrome, Cataracts, bilateral, Constipation, COPD (chronic obstructive pulmonary disease) ([address] Depression, Diarrhea, GERD (gastroesophageal reflux disease), H/O gastric ulcer, Hyperlipidemia, Hypertension, Mumps, MVP (mitral valve prolapse), Recurrent UTI, Sinusitis, Tracheostomy in place [facility]), Ulcerative esophagitis, and Wellness examination. has a past surgical history that includes Hysterectomy; total nephrectomy; Tonsillectomy; Appendectomy; Cystoscopy; Ovary removal; Tubal ligation; rhinoplasty; Carpal tunnel release; Hand surgery; Total hip arthroplasty; eye surgery; Colonoscopy; joint replacement (Right); Gastric fundoplication (N/A, 71/76/6268); hc cath power picc triple (03/04/2020); Upper gastrointestinal endoscopy (N/A, 03/17/2020); tracheostomy (N/A, 03/27/2020); Gastrostomy tube placement (N/A, 03/27/2020); tracheostomy (N/A, 04/02/2020); Upper gastrointestinal endoscopy (N/A, 05/18/2020); Upper gastrointestinal endoscopy (05/18/2020); ERCP (05/21/2020); ERCP (05/21/2020); ERCP (05/21/2020); ERCP (05/21/2020); Cholecystectomy, laparoscopic (N/A, 05/22/2020); Upper gastrointestinal endoscopy (N/A, 07/06/2020); Upper gastrointestinal endoscopy (N/A, 11/09/2020); Upper gastrointestinal endoscopy (02/08/2021); Gastrostomy tube placement (N/A, 2/8/2021); ERCP (N/A, 2/8/2021); ERCP (2/8/2021); Upper gastrointestinal endoscopy (N/A, 11/24/2021); hernia repair; Colonoscopy (N/A, 1/28/2022); and IR NONTUNNELED VASCULAR CATHETER > 5 YEARS (5/10/2022). Social History     Socioeconomic History    Marital status:       Spouse name: Not on file    Number of children: 2    Years of education: Not on file    Highest education level: Not on file   Occupational History    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   Tobacco Use    Smoking status: Former     Packs/day: 1.00     Years: 50.00     Pack years: 50.00     Types: Cigarettes    Smokeless tobacco: Never    Tobacco comments:     quit 1 year ago    Vaping Use    Vaping Use: Former    Substances: Always   Substance and Sexual Activity    Alcohol use: No    Drug use: No    Sexual activity: Never   Other Topics Concern    Not on file   Social History Narrative    Not on file     Social Determinants of Health     Financial Resource Strain: Not on file   Food Insecurity: Not on file   Transportation Needs: Not on file   Physical Activity: Not on file   Stress: Not on file   Social Connections: Not on file   Intimate Partner Violence: Not on file   Housing Stability: Not on file       Family History   Problem Relation Age of Onset    Cancer Mother         uterine    Heart Attack Mother     Heart Attack Father     Other Maternal Grandfather         foot gangrene    Other Paternal Grandmother         bleeding ulcers    Other Paternal Grandfather         lung cancer       Allergies:  Prednisone, Compazine [prochlorperazine maleate], Penicillin v potassium, and Penicillins    Home Medications:  Prior to Admission medications    Medication Sig Start Date End Date Taking? Authorizing Provider   ondansetron (ZOFRAN ODT) 4 MG disintegrating tablet Take 1 tablet by mouth every 8 hours as needed for Nausea or Vomiting 8/5/22 9/4/22  Jovanny Bose MD   carboxymethylcellulose 1 % ophthalmic solution Place 1 drop into both eyes in the morning and 1 drop at noon and 1 drop in the evening and 1 drop before bedtime.     Historical Provider, MD   albuterol (PROVENTIL) (2.5 MG/3ML) 0.083% nebulizer solution Take 2.5 mg by nebulization every 6 hours as needed for Wheezing    Historical Provider, MD   Dyphylline-guaiFENesin 100-100 MG/5ML LIQD Take 5 mLs by mouth every 4 hours as needed    Historical Provider, MD   magnesium hydroxide (MILK OF MAGNESIA) 400 MG/5ML suspension Take 30 mLs by mouth Give 30ml by mouth as needed for constipation no BM x3 days    Historical Provider, MD   acetaminophen (TYLENOL) 500 MG tablet Take 1,000 mg by mouth in the morning and at bedtime    Historical Provider, MD   ondansetron (ZOFRAN) 4 MG tablet Take 4 mg by mouth in the morning and 4 mg at noon and 4 mg in the evening and 4 mg before bedtime. Historical Provider, MD   clotrimazole-betamethasone (LOTRISONE) 1-0.05 % cream Apply topically 2 times daily Apply topically 2 times daily. 5/10/22   Sukh Cortés DO   Cholecalciferol (VITAMIN D) 50 MCG (2000 UT) CAPS capsule Take 1 capsule by mouth daily 5/10/22   Jacque Goetz DO   guaiFENesin (MUCINEX) 600 MG extended release tablet Take 400 mg by mouth in the morning. Indications: Cough. As needed for mucus. Historical Provider, MD   Lactobacillus TABS Take 1 tablet by mouth 2 times daily Take One tablet by mouth two times daily    Historical Provider, MD   fluticasone (FLONASE) 50 MCG/ACT nasal spray 1 spray by Each Nostril route daily    Historical Provider, MD   loratadine (CLARITIN) 10 MG capsule Take 10 mg by mouth daily    Historical Provider, MD   clonazePAM (KLONOPIN) 0.5 MG tablet Take 0.5 mg by mouth 3 times daily as needed.     Historical Provider, MD   bisacodyl (DULCOLAX) 5 MG EC tablet TAKE 2 TABS AS INSTRUCTED BY THE OFFICE THE DAY PRIOR TO COLONOSCOPY 1/5/22   Antoine Houser MD   metoclopramide (REGLAN) 5 MG tablet Take 1 tablet by mouth 4 times daily 1/4/22   Antoine Houser MD   polyethylene glycol (GLYCOLAX) 17 g packet Take 17 g by mouth daily as needed for Constipation 12/19/21   Terrell Pop MD   QUEtiapine (SEROQUEL) 100 MG tablet Take 1 tablet by mouth nightly for 3 days 12/19/21 12/22/21  Terrell Pop MD   QUEtiapine (SEROQUEL) 25 MG tablet Take 1 tablet by mouth every morning (before breakfast) for 3 days 12/19/21 12/22/21  Terrell Pop MD   gabapentin (NEURONTIN) 300 MG capsule Take 1 capsule by mouth 3 times daily for 3 days. 12/19/21 1/28/22  Felicita Meals, MD   escitalopram (LEXAPRO) 10 MG tablet Take 20 mg by mouth in the morning. Historical Provider, MD   Folic Acid-Cholecalciferol 1-2000 MG-UNIT TABS Take by mouth    Historical Provider, MD   busPIRone (BUSPAR) 10 MG tablet Take 1 tablet by mouth 2 times daily 11/30/21   Piyush Sorto MD   pantoprazole (PROTONIX) 40 MG tablet Take 1 tablet by mouth 2 times daily 11/25/21 1/28/22  Felicita Meals, MD   traZODone (DESYREL) 100 MG tablet Take 1 tablet by mouth nightly for 7 days 11/25/21 12/18/21  Felicita Meals, MD   bisacodyl (DULCOLAX) 10 MG suppository Place 10 mg rectally daily as needed for Constipation PRN for constipation no BM x4 days. Historical Provider, MD   Sodium Phosphates (FLEET) 7-19 GM/118ML Place 1 enema rectally As needed after no BM for 5 days    Historical Provider, MD   budesonide-formoterol (SYMBICORT) 160-4.5 MCG/ACT AERO Inhale 2 puffs into the lungs 2 times daily    Historical Provider, MD   ferrous sulfate (FE TABS 325) 325 (65 Fe) MG EC tablet Take 1 tablet by mouth daily (with breakfast) 12/16/20   MARGOTH Desai - NP   amitriptyline (ELAVIL) 25 MG tablet Take 1 tablet by mouth nightly  Patient taking differently: Take 25 mg by mouth in the morning, at noon, and at bedtime 7/14/20   Aaron Brand MD   metoprolol succinate (TOPROL XL) 25 MG extended release tablet Take 1 tablet by mouth daily 5/30/20   Vinayak Goodson MD   sucralfate (CARAFATE) 1 GM tablet Take 1 tablet by mouth 4 times daily 4/6/20   Myles Metz MD   Oyster Shell 500 MG TABS Take 500 mg by mouth 2 times daily     Historical Provider, MD   calcium carbonate (TUMS) 500 MG chewable tablet Take 1 tablet by mouth 3 times daily as needed for Heartburn    Historical Provider, MD   simvastatin (ZOCOR) 40 MG tablet Take 40 mg by mouth nightly.     Historical Provider, MD       REVIEW OF SYSTEMS    (2-9 systems for level 4, 10 or more for level 5) Skin is not jaundiced. Findings: No bruising. Neurological:      General: No focal deficit present. Mental Status: She is alert.        DIFFERENTIAL  DIAGNOSIS     PLAN (LABS / Maribel Breed / EKG):  Orders Placed This Encounter   Procedures    Culture, Urine    CT ABDOMEN PELVIS W IV CONTRAST Additional Contrast? None    CMP    CBC with Auto Differential    Lipase    Magnesium    Urinalysis with Microscopic    Lactic Acid       MEDICATIONS ORDERED:  Orders Placed This Encounter   Medications    0.9 % sodium chloride bolus    fentaNYL (SUBLIMAZE) injection 25 mcg    iopamidol (ISOVUE-370) 76 % injection 75 mL    fentaNYL (SUBLIMAZE) injection 50 mcg       DDX: GERD, pancreatitis, cholecystitis, GB colic, cholangitis, mesenteric ischemia, acute gastroenteritis, pyelonephritis, kidney stone, appendicitis, hernia, UTI, constipation, ectopic, ovarian torsion, ovarian cyst, period/ fibroid      DIAGNOSTIC RESULTS / EMERGENCY DEPARTMENT COURSE / MDM   LAB RESULTS:  Results for orders placed or performed during the hospital encounter of 08/17/22   CMP   Result Value Ref Range    Glucose 92 70 - 99 mg/dL    BUN 10 8 - 23 mg/dL    Creatinine 0.91 (H) 0.50 - 0.90 mg/dL    Calcium 9.8 8.6 - 10.4 mg/dL    Sodium 140 135 - 144 mmol/L    Potassium 3.9 3.7 - 5.3 mmol/L    Chloride 104 98 - 107 mmol/L    CO2 24 20 - 31 mmol/L    Anion Gap 12 9 - 17 mmol/L    Alkaline Phosphatase 116 (H) 35 - 104 U/L    ALT 31 5 - 33 U/L    AST 31 <32 U/L    Total Bilirubin 0.42 0.3 - 1.2 mg/dL    Total Protein 7.0 6.4 - 8.3 g/dL    Albumin 4.2 3.5 - 5.2 g/dL    Albumin/Globulin Ratio 1.5 1.0 - 2.5    GFR Non-African American >60 >60 mL/min    GFR African American >60 >60 mL/min    GFR Comment         CBC with Auto Differential   Result Value Ref Range    WBC 6.1 3.5 - 11.3 k/uL    RBC 4.77 3.95 - 5.11 m/uL    Hemoglobin 13.9 11.9 - 15.1 g/dL    Hematocrit 42.6 36.3 - 47.1 %    MCV 89.3 82.6 - 102.9 fL    MCH 29.1 25.2 - 33.5 pg    MCHC 32.6 28.4 - 34.8 g/dL    RDW 14.6 (H) 11.8 - 14.4 %    Platelets 227 195 - 709 k/uL    MPV 11.2 8.1 - 13.5 fL    NRBC Automated 0.0 0.0 per 100 WBC    Seg Neutrophils 46 36 - 65 %    Lymphocytes 32 24 - 43 %    Monocytes 15 (H) 3 - 12 %    Eosinophils % 5 (H) 1 - 4 %    Basophils 1 0 - 2 %    Immature Granulocytes 1 (H) 0 %    Segs Absolute 2.84 1.50 - 8.10 k/uL    Absolute Lymph # 1.91 1.10 - 3.70 k/uL    Absolute Mono # 0.92 0.10 - 1.20 k/uL    Absolute Eos # 0.31 0.00 - 0.44 k/uL    Basophils Absolute 0.05 0.00 - 0.20 k/uL    Absolute Immature Granulocyte 0.03 0.00 - 0.30 k/uL    RBC Morphology ANISOCYTOSIS PRESENT    Lipase   Result Value Ref Range    Lipase 19 13 - 60 U/L   Magnesium   Result Value Ref Range    Magnesium 1.7 1.6 - 2.6 mg/dL   Urinalysis with Microscopic   Result Value Ref Range    Color, UA Yellow Yellow    Turbidity UA Clear Clear    Glucose, Ur NEGATIVE NEGATIVE    Bilirubin Urine NEGATIVE NEGATIVE    Ketones, Urine NEGATIVE NEGATIVE    Specific Gravity, UA 1.021 1.005 - 1.030    Urine Hgb NEGATIVE NEGATIVE    pH, UA 6.5 5.0 - 8.0    Protein, UA NEGATIVE NEGATIVE    Urobilinogen, Urine Normal Normal    Nitrite, Urine NEGATIVE NEGATIVE    Leukocyte Esterase, Urine TRACE (A) NEGATIVE    WBC, UA 2 TO 5 0 - 5 /HPF    RBC, UA 0 TO 2 0 - 4 /HPF    Epithelial Cells UA 2 TO 5 0 - 5 /HPF   Lactic Acid   Result Value Ref Range    Lactic Acid, Whole Blood 1.8 0.7 - 2.1 mmol/L         RADIOLOGY:  CT ABDOMEN PELVIS WO CONTRAST Additional Contrast? None    Result Date: 7/31/2022  1. No acute findings in the abdomen or pelvis. 2. Trace bilateral pleural effusions and trace pericardial effusion. 3. Incidental findings as above for which no dedicated follow-up is recommended. XR ABDOMEN (KUB) (SINGLE AP VIEW)    Result Date: 7/31/2022  Gaseous dilatation of the stomach. Patient may benefit from an NG tube. Moderate constipation of the colon without a large focal bolus in the rectum.  The small bowel and colon are not dilated. CT HEAD WO CONTRAST    Result Date: 7/31/2022  No acute intracranial abnormality. US RENAL COMPLETE    Result Date: 8/1/2022  1. Tiny 1.2 cm lower pole left renal cortical cyst. 2. No overt left-sided hydronephrosis. Left ureteral jet is visualized. 3. Prior right nephrectomy. CT ABDOMEN PELVIS W IV CONTRAST Additional Contrast? None    Result Date: 8/17/2022  Mild mural thickening involving the distal esophagus, suggesting esophagitis. That should be followed to resolution. Otherwise, no acute abnormalities are identified to explain abdominal pain. CT ABDOMEN PELVIS W IV CONTRAST Additional Contrast? Oral    Result Date: 8/3/2022  Since prior examination, interval development of moderate wall thickening within the rectum and sigmoid colon likely suggestive of proctocolitis. Status post cholecystectomy. Right kidney is not visualized. Axial hiatal hernia. XR CHEST PORTABLE    Result Date: 7/31/2022  No acute process. Assessment/Plan: Patient is a 66-year-old female with history of recurrent UTIs, ESBL resistant organisms. Patient was evaluated with basic lab work, did not have an elevated white count. Afebrile, electrolytes normal.  CT abdomen pelvis was unremarkable. Patient was hemodynamically stable, lactic acid negative. Appropriate for discharge and outpatient follow-up. Patient was signed out to Dr. Felipe Martinez      1. Lower abdominal pain    2.  Esophagitis          DISPOSITION / PLAN     DISPOSITION Decision To Discharge 08/17/2022 08:31:02 PM      PATIENT REFERRED TO:  [facility]  [address] [place] [address]  [phone]  Go to   As needed    DISCHARGE MEDICATIONS:  Discharge Medication List as of 8/17/2022  9:50 PM          Lavinia Dover DO  Emergency Medicine Resident    (Please note that portions of thisnote were completed with a voice recognition program.  Efforts were made to edit the dictations but occasionally words are mis-transcribed.)        Sebastian Cortes, DO  Resident  08/18/22 [id number]

## 2022-08-17 NOTE — ED PROVIDER NOTES
[facility]  ED  Emergency Department  Emergency Medicine Sign-out     Care of Collette Connor was assumed from Dr. Valentin Ventura and is being seen for Abdominal Pain (Recent UTI. Treated with antibiotics. Diarrhea since start of antibiotics) and Diarrhea  . The patient's initial evaluation and plan have been discussed with the prior attending who initially evaluated the patient. EMERGENCY DEPARTMENT COURSE / MEDICAL DECISION MAKING:       MEDICATIONS GIVEN:  Orders Placed This Encounter   Medications    0.9 % sodium chloride bolus    fentaNYL (SUBLIMAZE) injection 25 mcg    iopamidol (ISOVUE-370) 76 % injection 75 mL       LABS / RADIOLOGY:     Labs Reviewed   COMPREHENSIVE METABOLIC PANEL - Abnormal; Notable for the following components:       Result Value    Creatinine 0.91 (*)     Alkaline Phosphatase 116 (*)     All other components within normal limits   CBC WITH AUTO DIFFERENTIAL - Abnormal; Notable for the following components:    RDW 14.6 (*)     Monocytes 15 (*)     Eosinophils % 5 (*)     Immature Granulocytes 1 (*)     All other components within normal limits   URINALYSIS WITH MICROSCOPIC - Abnormal; Notable for the following components:    Leukocyte Esterase, Urine TRACE (*)     All other components within normal limits   CULTURE, URINE   LIPASE   MAGNESIUM   LACTIC ACID       CT ABDOMEN PELVIS WO CONTRAST Additional Contrast? None    Result Date: 7/31/2022  EXAMINATION: CT OF THE ABDOMEN AND PELVIS WITHOUT CONTRAST 7/31/2022 3:32 pm TECHNIQUE: CT of the abdomen and pelvis was performed without the administration of intravenous contrast. Multiplanar reformatted images are provided for review. Automated exposure control, iterative reconstruction, and/or weight based adjustment of the mA/kV was utilized to reduce the radiation dose to as low as reasonably achievable.  COMPARISON: Abdomen and pelvis CT 05/06/2022 HISTORY: ORDERING SYSTEM PROVIDED HISTORY: Abdominal distention, diffuse lower abdominal pain, no bowel movement x3 days TECHNOLOGIST PROVIDED HISTORY: Abdominal distention, diffuse lower abdominal pain, no bowel movement x3 days Decision Support Exception - unselect if not a suspected or confirmed emergency medical condition->Emergency Medical Condition (MA) Reason for Exam: Abdominal distention, diffuse lower abdominal pain, no bowel movement x3 days FINDINGS: Lack of intravenous contrast administration, partially limiting evaluation of the soft tissues and vasculature. LOWER CHEST:  Trace bilateral pleural effusions with minimal passive atelectasis in the overlying bilateral lower lobes. Linear opacities in each lung base due to scarring or subsegmental atelectasis. Mild cardiomegaly. Trace pericardial effusion. ORGANS:  Mild enlargement of the right hemiliver suggestive of a normal variant Riedel's lobe. Surgically absent gallbladder and right kidney. No intrahepatic nor extrahepatic biliary dilation. Moderate to severe pancreatic atrophy. 0.3 cm simple appearing cyst in the posterior lower pole of the left kidney (Bosniak category 2). Unchanged mild left hydronephrosis likely due to chronic left ureteropelvic junction obstruction. Normal appearance of the spleen and adrenal glands. GI/BOWEL:  Fluid within the distended distal thoracic esophagus, a potential risk factor for aspiration. Small to moderate sliding hiatal hernia. Otherwise normal course and caliber of the stomach, small bowel, colon, and rectum without obstruction. No visualization of the appendix, reportedly surgically absent. Mild to moderate stool predominantly in the right hemicolon. PELVIS:  Partially obscured by streak artifact from a right hip prosthesis. Surgically absent uterus and ovaries. Normal appearance of the urinary bladder. PERITONEUM/RETROPERITONEUM:  Mild to moderate systemic atherosclerotic calcifications. No abdominal nor pelvic lymphadenopathy. No free intraperitoneal fluid nor gas.  SOFT TISSUES/BONES:  Laxity of the anterior and lateral abdominal wall musculature with diastasis recti. No inguinal lymphadenopathy. Partially included changes of right hip total arthroplasty with no evident complication. Diffuse bony demineralization. No acute fractures nor suspicious bony lesions. 1. No acute findings in the abdomen or pelvis. 2. Trace bilateral pleural effusions and trace pericardial effusion. 3. Incidental findings as above for which no dedicated follow-up is recommended. XR ABDOMEN (KUB) (SINGLE AP VIEW)    Result Date: 7/31/2022  EXAMINATION: ONE SUPINE XRAY VIEW(S) OF THE ABDOMEN 7/31/2022 9:56 pm COMPARISON: 05/08/2022 HISTORY: ORDERING SYSTEM PROVIDED HISTORY: R/O ileus and obstrcution TECHNOLOGIST PROVIDED HISTORY: R/O ileus and obstrcution FINDINGS: There is gaseous dilatation of the stomach. The small bowel and colon are not dilated. There is moderate constipation of the colon. There is no large focal bolus over the rectum. Supine position prevents evaluation of air-fluid levels and pneumoperitoneum. Evaluation for calcifications in the soft tissues are suboptimal. Coarse markings in the lungs. The bones are osteopenic with degenerative changes throughout the spine. Right hip arthroplasty is present. Gaseous dilatation of the stomach. Patient may benefit from an NG tube. Moderate constipation of the colon without a large focal bolus in the rectum. The small bowel and colon are not dilated. CT HEAD WO CONTRAST    Result Date: 7/31/2022  EXAMINATION: CT OF THE HEAD WITHOUT CONTRAST  7/31/2022 3:32 pm TECHNIQUE: CT of the head was performed without the administration of intravenous contrast. Automated exposure control, iterative reconstruction, and/or weight based adjustment of the mA/kV was utilized to reduce the radiation dose to as low as reasonably achievable. COMPARISON: 11/24/2021 MRI.  HISTORY: ORDERING SYSTEM PROVIDED HISTORY: Altered coming from care facility. TECHNOLOGIST PROVIDED HISTORY: Altered coming from care facility. Decision Support Exception - unselect if not a suspected or confirmed emergency medical condition->Emergency Medical Condition (MA) Reason for Exam: Altered coming from care facility. FINDINGS: BRAIN/VENTRICLES: There is no acute intracranial hemorrhage, mass effect or midline shift. No abnormal extra-axial fluid collection. The gray-white differentiation is maintained without evidence of an acute infarct. Involutional parenchymal changes. There is no evidence of hydrocephalus. ORBITS: The visualized portion of the orbits demonstrate no acute abnormality. Bilateral lens replacement. SINUSES: The visualized paranasal sinuses and mastoid air cells demonstrate no acute abnormality. SOFT TISSUES/SKULL:  No acute abnormality of the visualized skull or soft tissues. No acute intracranial abnormality. US RENAL COMPLETE    Result Date: 8/1/2022  EXAMINATION: RETROPERITONEAL ULTRASOUND OF THE KIDNEYS AND URINARY BLADDER 8/1/2022 COMPARISON: CT abdomen pelvis from 07/31/2022 HISTORY: ORDERING SYSTEM PROVIDED HISTORY: BENEDICT TECHNOLOGIST PROVIDED HISTORY: BENEDICT 66-year-old female with history of acute kidney injury FINDINGS: Kidneys: Right kidney measures prior right nephrectomy. Left kidney measures 14.5 x 6.5 x 5.6 cm. Left renal cortical thickness measures 2 cm. No hydronephrosis or perinephric fluid. Tiny 1.2 cm cortical cyst at the lower pole left kidney. Gross preservation of the left-sided corticomedullary differentiation. Bladder: Urinary bladder measures 6.7 x 5.1 by 3.2 cm for a bladder volume of 57 mL. Left ureteral jet is visualized. Patient had no urge to void during the exam.     1. Tiny 1.2 cm lower pole left renal cortical cyst. 2. No overt left-sided hydronephrosis. Left ureteral jet is visualized. 3. Prior right nephrectomy.      CT ABDOMEN PELVIS W IV CONTRAST Additional Contrast? Oral    Result Date: 8/3/2022  EXAMINATION: CT OF THE ABDOMEN AND PELVIS WITH CONTRAST 8/3/2022 1:18 pm TECHNIQUE: CT of the abdomen and pelvis was performed with the administration of intravenous contrast. Multiplanar reformatted images are provided for review. Automated exposure control, iterative reconstruction, and/or weight based adjustment of the mA/kV was utilized to reduce the radiation dose to as low as reasonably achievable. COMPARISON: 07/31/2022 HISTORY: ORDERING SYSTEM PROVIDED HISTORY: right sided abdomnial tenderness TECHNOLOGIST PROVIDED HISTORY: right sided abdomnial tenderness Reason for Exam: abdominal pain body aches FINDINGS: LOWER CHEST:  Visualized portion of the lower chest demonstrates no acute abnormality. Axial hiatal hernia. KIDNEYS AND URINARY TRACT: No renal calculi are identified. There is no evidence for hydronephrosis. The ureters are of normal course and caliber. Simple cyst arising from the lower pole of left kidney. Right kidney is not visualized ORGANS: Status post cholecystectomy. Visualized portions of the liver, spleen, pancreas,  and adrenal glands demonstrate no acute abnormality. GI/BOWEL: No bowel obstruction. No evidence of acute appendicitis. Moderate amount of wall thickening of the rectum and sigmoid colon likely related to colitis. PELVIS: The bladder and pelvic organs are unremarkable. PERITONEUM/RETROPERITONEUM: No free air or free fluid is noted. No pathologically enlarged lymphadenopathy. The vasculature do not demonstrate acute abnormality. BONES/SOFT TISSUES: The osseous structures demonstrate no acute abnormality. Changes related to right hip arthroplasty. Moderate levoscoliosis with tip L1-L2. Since prior examination, interval development of moderate wall thickening within the rectum and sigmoid colon likely suggestive of proctocolitis. Status post cholecystectomy. Right kidney is not visualized. Axial hiatal hernia.      XR CHEST PORTABLE    Result Date: 7/31/2022  EXAMINATION: ONE XRAY VIEW OF THE CHEST 7/31/2022 11:47 am COMPARISON: 05/06/2022 HISTORY: ORDERING SYSTEM PROVIDED HISTORY: sepsis rule out, harsh cough TECHNOLOGIST PROVIDED HISTORY: sepsis rule out, harsh cough Reason for Exam: port upright FINDINGS: The lungs are without acute focal process. There is no effusion or pneumothorax. The cardiomediastinal silhouette is stable. The osseous structures are stable. No acute process. RECENT VITALS:     Temp: 98.2 °F (36.8 °C),  Heart Rate: 74, Resp: 18, BP: (!) 151/89, SpO2: 95 %    This patient is a 68 y.o. Female with patient is a 51-year-old female presenting with generalized abdominal pain. Patient was recently admitted for UTI, discharged with IV antibiotics. Patient was on carbapenems for resistant UTI organisms. Patient complained that she has been having worsening abdominal pain over the past week. Patient has not had any vomiting, patient has had diarrhea over the past week while she has been on her antibiotic medication, her diarrhea stopped today as she has been getting Imodium at her nursing home. Patient will be worked up with CT scan, labs grossly unremarkable, pending urinalysis. If labs all unremarkable, patient may be discharged, patient is afebrile hemodynamically stable normotensive not tachycardic and not tachypneic. OUTSTANDING TASKS / RECOMMENDATIONS:    Follow-up labs  Dispo     FINAL IMPRESSION:     1.  Lower abdominal pain        DISPOSITION:         DISPOSITION:  [x]  Discharge   []  Transfer -    []  Admission -     []  Against Medical Advice   []  Eloped   FOLLOW-UP: [facility]  [address] [place] [address]  [phone]  Go to   As needed   DISCHARGE MEDICATIONS: New Prescriptions    No medications on file           Gilbert Daley MD  Emergency Medicine Attending  [facility]       Gilbert Daley MD  Resident  08/17/22 [id number]

## 2022-08-17 NOTE — ED NOTES
Patient transported by EMS from SNF c/o diarrhea and abdominal pain x5 days. Pt states she recently was seen in ED and diagnosed with UTI and given antibiotics. States she has been experiencing diarrhea since starting antibiotics however has not had an episode of diarrhea today.       Joyce Pinto RN  08/17/22 1453

## 2022-08-18 LAB
CULTURE: NORMAL
SPECIMEN DESCRIPTION: NORMAL

## 2022-08-18 ASSESSMENT — ENCOUNTER SYMPTOMS
BACK PAIN: 0
DIARRHEA: 0
CHEST TIGHTNESS: 0
VOMITING: 0
SHORTNESS OF BREATH: 0
COLOR CHANGE: 0
NAUSEA: 1
ABDOMINAL PAIN: 1
COUGH: 0
TROUBLE SWALLOWING: 0

## 2022-08-18 NOTE — ED NOTES
Writer called patient's nursing facility. Facility states they are acceptable to pt's son transferring patient as he comes to pick her up on the weekends anyway. Facility states pt will be given night time medications when she returns. Patient and pt's son notified.       Edda Farmer RN  08/17/22 1486

## 2022-08-18 NOTE — ED NOTES
Dr. Josefa Ahn at bedside      [facility]  08/17/22 [id number]

## 2022-08-18 NOTE — ED NOTES
Dr. Luanne Grider at bedside with Krissam Aj. Dr. Luanne Grider explaining pt's ED course and decision to discharge. Pt and pt's son do not agree with discharge d/t pt's pain. Pt's son asking to speak with \"someone above both of you guys\" and states Dr. Luanne Grider \"is being arrogant and must hate his job\". Dr. Luanne Grider again calmly explaining decision to discharge and son is again raising his voice with RN and physician. As RN left pt's room, pt's son states \"fucking dumbass idiots\".       Juan Hill RN  08/17/22 0194

## 2022-08-18 NOTE — ED NOTES
Governor [facility] cancelled. Patient's son is transporting to [facility].      KELSI Harrison  08/17/22 1202

## 2022-08-18 NOTE — ED NOTES
RN went to give pain medication to patient and provide discharge instructions. Pt anxious and crying stating \"I'm not crazy, I know there is something wrong with me. Why aren't you keeping me? I'm in pain. \" Patient's son at bedside began raising his voice and yelling \"we are never coming back to this hospital again. This is ridiculous. I want to talk to the charge nurse and the manager. Isn't there another doctor who will admit her? \". Dr. Gloria Vargas notified.       Demarco Waller RN  08/17/22 8301

## 2022-08-22 ENCOUNTER — TELEPHONE (OUTPATIENT)
Dept: GASTROENTEROLOGY | Age: 77
End: 2022-08-22

## 2022-08-22 NOTE — TELEPHONE ENCOUNTER
Pt son called stating pt is in a nursing home has been seen in the hospital multiple times and has severe abd pain, pt is scheduled to be seen on 12/29/22, would like pt to be seen sooner, adv pt son will have Dr Santillan Ear nurse review schedule and see if pt can be seen sooner, pt thanked writer call ended.

## 2022-08-23 NOTE — TELEPHONE ENCOUNTER
Called pt's son to discuss setting up VV visit with Corrina Thursday 8/25 at 4. He claimed this appointment was useless and she needed to be treated. I informed the son that Jill Cat will discuss treatment with hin and the pt during this appointment. Son ended call.

## 2022-08-25 ENCOUNTER — TELEMEDICINE (OUTPATIENT)
Dept: GASTROENTEROLOGY | Age: 77
End: 2022-08-25
Payer: MEDICARE

## 2022-08-25 DIAGNOSIS — R93.3 ABNORMAL CT SCAN, ESOPHAGUS: Primary | ICD-10-CM

## 2022-08-25 DIAGNOSIS — R19.7 DIARRHEA OF PRESUMED INFECTIOUS ORIGIN: ICD-10-CM

## 2022-08-25 DIAGNOSIS — T85.590D: ICD-10-CM

## 2022-08-25 DIAGNOSIS — K22.89 ESOPHAGEAL DILATATION: ICD-10-CM

## 2022-08-25 DIAGNOSIS — K31.1 GASTRIC OUTLET OBSTRUCTION: ICD-10-CM

## 2022-08-25 DIAGNOSIS — R74.8 ELEVATED LIVER ENZYMES: ICD-10-CM

## 2022-08-25 DIAGNOSIS — Z98.890 S/P NISSEN FUNDOPLICATION (WITHOUT GASTROSTOMY TUBE) PROCEDURE: ICD-10-CM

## 2022-08-25 DIAGNOSIS — D50.0 ANEMIA, BLOOD LOSS: ICD-10-CM

## 2022-08-25 PROCEDURE — 1090F PRES/ABSN URINE INCON ASSESS: CPT | Performed by: INTERNAL MEDICINE

## 2022-08-25 PROCEDURE — 1123F ACP DISCUSS/DSCN MKR DOCD: CPT | Performed by: INTERNAL MEDICINE

## 2022-08-25 PROCEDURE — G8417 CALC BMI ABV UP PARAM F/U: HCPCS | Performed by: INTERNAL MEDICINE

## 2022-08-25 PROCEDURE — 99214 OFFICE O/P EST MOD 30 MIN: CPT | Performed by: INTERNAL MEDICINE

## 2022-08-25 PROCEDURE — G8427 DOCREV CUR MEDS BY ELIG CLIN: HCPCS | Performed by: INTERNAL MEDICINE

## 2022-08-25 PROCEDURE — 1111F DSCHRG MED/CURRENT MED MERGE: CPT | Performed by: INTERNAL MEDICINE

## 2022-08-25 PROCEDURE — G8400 PT W/DXA NO RESULTS DOC: HCPCS | Performed by: INTERNAL MEDICINE

## 2022-08-25 PROCEDURE — 1036F TOBACCO NON-USER: CPT | Performed by: INTERNAL MEDICINE

## 2022-08-25 ASSESSMENT — ENCOUNTER SYMPTOMS
WHEEZING: 0
RECTAL PAIN: 0
ANAL BLEEDING: 0
ABDOMINAL DISTENTION: 0
CONSTIPATION: 0
COUGH: 0
BLOOD IN STOOL: 0
ABDOMINAL PAIN: 1
CHOKING: 0
VOMITING: 0
SHORTNESS OF BREATH: 0
NAUSEA: 0
TROUBLE SWALLOWING: 0
DIARRHEA: 1

## 2022-08-25 NOTE — PROGRESS NOTES
Yasmin Cunningham is a 68 y.o. female evaluated via telephone on 8/25/2022. Consent:  She and/or health care decision maker is aware that that she may receive a bill for this telephone service, depending on her insurance coverage, and has provided verbal consent to proceed: Yes      GI  FOLLOW UP    INTERVAL HISTORY:   No referring provider defined for this encounter. Chief Complaint   Patient presents with    Abdominal Pain     Patient c/o lower abd and right side pain for a couple of weeks. Pain gets works later in the day. Diarrhea     Patient c/o diarrhea. HISTORY OF PRESENT ILLNESS: Elizabeth Willis is a 68 y.o. female , referred for evaluation of GERD, gastric outlet obstruction, dysphagia and dilatation, colon polyps, hemorrhoids  Elevated alkaline phosphatase    Patient is here for follow-up for we have seen her in the past with the anemia which has stabilized and her hemoglobin was normalized, she is status post Nissen  Her reflux was controlled  She did have elevated alkaline phosphatase which we thought may be related to the bone as the CAT scan of the abdomen was negative    In the past there was some concern because her MRI was done showed CBD obstruction for which an ERCP was done, was difficult to read please refer to the ERCP result. Another ERCP was done and the stent was removed and there is sludge and stones were removed. This is has been resolved right now  She is here today because she was admitted [facility] in [facility]. Brian's with what seems to be esophagitis as the CAT scan is showing some thickening  Of the distal esophagus  But also she is complaining of 2 weeks of diarrhea  She is currently in the nursing home  No fever no chills  No odynophagia or dysphagia              Past Medical,Family, and Social History reviewed and does contribute to the patient presentingcondition.     Patient's PMH/PSH,SH,PSYCH Hx, MEDs, ALLERGIES, and ROS were all reviewed and updated in the appropriate sections.     PAST MEDICAL HISTORY:  Past Medical History:   Diagnosis Date    Anxiety     Arthritis     Back pain     Bronchitis     Caffeine use     8 coffee / day    Carpal tunnel syndrome     Cataracts, bilateral     Constipation     COPD (chronic obstructive pulmonary disease) (HCC)     Emphysema    Depression     Diarrhea     GERD (gastroesophageal reflux disease)     H/O gastric ulcer     Hyperlipidemia     Hypertension     Mumps     MVP (mitral valve prolapse)     Dr. Vinie Eisenmenger in May 2019    Recurrent UTI     Dr. Mahsa Franklin    Sinusitis     Tracheostomy in place [facility])     Ulcerative esophagitis     Wellness examination     Dr. Augusto Andrade seen in Jan 2020       Past Surgical History:   Procedure Laterality Date    APPENDECTOMY      CARPAL [facility], LAPAROSCOPIC N/A 05/22/2020    XI LAPAROSCOPIC ROBOTIC CHOLECYSTECTOMY, PYLOROPLASTY performed by Holden Fuller MD at [place] [address]      COLONOSCOPY N/A 1/28/2022    COLONOSCOPY POLYPECTOMY HOT performed by Sandra Felix MD at [address]      ERCP  05/21/2020    with stent insertion, balloon dilation, sphinctereotomy    ERCP  05/21/2020    ** CASE IN OR WITY GI STAFF** ERCP STENT INSERTION performed by Sandra Felix MD at [facility]    ERCP  05/21/2020    ** CASE IN OR WITH GI STAFF**ERCP SPHINCTER/PAPILLOTOMY performed by Sandra Felix MD at [facility]    ERCP  05/21/2020    ** CASE IN OR WITH GI STAFF**ERCP DILATION BALLOON performed by Sandra Felix MD at [facility]    ERCP N/A 2/8/2021    ERCP STENT REMOVAL performed by Alycia Vergara MD at [facility]    ERCP  2/8/2021    ERCP STONE REMOVAL performed by Alycia Vergara MD at [address]    GASTRIC FUNDOPLICATION N/A 77/60/0917    XI LAPAROSCOPIC ROBOTIC [facility], CHERYL FUNDOPLICATION performed by Holden Fuller MD at [address] [place] [address] 03/27/2020    EGD PEG TUBE PLACEMENT tablet by mouth every 8 hours as needed for Nausea or Vomiting, Disp: 90 tablet, Rfl: 0    carboxymethylcellulose 1 % ophthalmic solution, Place 1 drop into both eyes in the morning and 1 drop at noon and 1 drop in the evening and 1 drop before bedtime. , Disp: , Rfl:     albuterol (PROVENTIL) (2.5 MG/3ML) 0.083% nebulizer solution, Take 2.5 mg by nebulization every 6 hours as needed for Wheezing, Disp: , Rfl:     Dyphylline-guaiFENesin 100-100 MG/5ML LIQD, Take 5 mLs by mouth every 4 hours as needed, Disp: , Rfl:     magnesium hydroxide (MILK OF MAGNESIA) 400 MG/5ML suspension, Take 30 mLs by mouth Give 30ml by mouth as needed for constipation no BM x3 days, Disp: , Rfl:     acetaminophen (TYLENOL) 500 MG tablet, Take 1,000 mg by mouth in the morning and at bedtime, Disp: , Rfl:     ondansetron (ZOFRAN) 4 MG tablet, Take 4 mg by mouth in the morning and 4 mg at noon and 4 mg in the evening and 4 mg before bedtime. , Disp: , Rfl:     clotrimazole-betamethasone (LOTRISONE) 1-0.05 % cream, Apply topically 2 times daily Apply topically 2 times daily. , Disp: 15 g, Rfl: 0    Cholecalciferol (VITAMIN D) 50 MCG (2000 UT) CAPS capsule, Take 1 capsule by mouth daily, Disp: 30 capsule, Rfl: 0    guaiFENesin (MUCINEX) 600 MG extended release tablet, Take 400 mg by mouth in the morning. Indications: Cough. As needed for mucus. , Disp: , Rfl:     Lactobacillus TABS, Take 1 tablet by mouth 2 times daily Take One tablet by mouth two times daily, Disp: , Rfl:     fluticasone (FLONASE) 50 MCG/ACT nasal spray, 1 spray by Each Nostril route daily, Disp: , Rfl:     loratadine (CLARITIN) 10 MG capsule, Take 10 mg by mouth daily, Disp: , Rfl:     clonazePAM (KLONOPIN) 0.5 MG tablet, Take 0.5 mg by mouth 3 times daily as needed. , Disp: , Rfl:     bisacodyl (DULCOLAX) 5 MG EC tablet, TAKE 2 TABS AS INSTRUCTED BY THE OFFICE THE DAY PRIOR TO COLONOSCOPY, Disp: 2 tablet, Rfl: 0    metoclopramide (REGLAN) 5 MG tablet, Take 1 tablet by mouth 4 times daily, Disp: 120 tablet, Rfl: 3    polyethylene glycol (GLYCOLAX) 17 g packet, Take 17 g by mouth daily as needed for Constipation, Disp: 527 g, Rfl: 1    QUEtiapine (SEROQUEL) 100 MG tablet, Take 1 tablet by mouth nightly for 3 days, Disp: 3 tablet, Rfl: 0    QUEtiapine (SEROQUEL) 25 MG tablet, Take 1 tablet by mouth every morning (before breakfast) for 3 days, Disp: 3 tablet, Rfl: 0    gabapentin (NEURONTIN) 300 MG capsule, Take 1 capsule by mouth 3 times daily for 3 days. , Disp: 9 capsule, Rfl: 0    escitalopram (LEXAPRO) 10 MG tablet, Take 20 mg by mouth in the morning., Disp: , Rfl:     Folic Acid-Cholecalciferol 1-2000 MG-UNIT TABS, Take by mouth, Disp: , Rfl:     busPIRone (BUSPAR) 10 MG tablet, Take 1 tablet by mouth 2 times daily, Disp: , Rfl: 0    pantoprazole (PROTONIX) 40 MG tablet, Take 1 tablet by mouth 2 times daily, Disp: 60 tablet, Rfl: 0    traZODone (DESYREL) 100 MG tablet, Take 1 tablet by mouth nightly for 7 days, Disp: 7 tablet, Rfl: 0    bisacodyl (DULCOLAX) 10 MG suppository, Place 10 mg rectally daily as needed for Constipation PRN for constipation no BM x4 days. , Disp: , Rfl:     Sodium Phosphates (FLEET) 7-19 GM/118ML, Place 1 enema rectally As needed after no BM for 5 days, Disp: , Rfl:     budesonide-formoterol (SYMBICORT) 160-4.5 MCG/ACT AERO, Inhale 2 puffs into the lungs 2 times daily, Disp: , Rfl:     ferrous sulfate (FE TABS 325) 325 (65 Fe) MG EC tablet, Take 1 tablet by mouth daily (with breakfast), Disp: 90 tablet, Rfl: 3    amitriptyline (ELAVIL) 25 MG tablet, Take 1 tablet by mouth nightly (Patient taking differently: Take 25 mg by mouth in the morning, at noon, and at bedtime), Disp: , Rfl:     metoprolol succinate (TOPROL XL) 25 MG extended release tablet, Take 1 tablet by mouth daily, Disp: 30 tablet, Rfl: 3    sucralfate (CARAFATE) 1 GM tablet, Take 1 tablet by mouth 4 times daily, Disp: 120 tablet, Rfl: 3    Oyster Shell 500 MG TABS, Take 500 mg by mouth 2 times daily , Disp: , Rfl:     calcium carbonate (TUMS) 500 MG chewable tablet, Take 1 tablet by mouth 3 times daily as needed for Heartburn, Disp: , Rfl:     simvastatin (ZOCOR) 40 MG tablet, Take 40 mg by mouth nightly., Disp: , Rfl:     ALLERGIES:   Allergies   Allergen Reactions    Prednisone Anxiety    Compazine [Prochlorperazine Maleate]     Penicillin V Potassium     Penicillins Other (See Comments)     Shock- received meropenem and ceftriaxone wo issues 2020       FAMILY HISTORY:       Problem Relation Age of Onset    Cancer Mother         uterine    Heart Attack Mother     Heart Attack Father     Other Maternal Grandfather         foot gangrene    Other Paternal Grandmother         bleeding ulcers    Other Paternal Grandfather         lung cancer         SOCIAL HISTORY:   Social History     Socioeconomic History    Marital status:      Spouse name: Not on file    Number of children: 2    Years of education: Not on file    Highest education level: Not on file   Occupational History    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   Tobacco Use    Smoking status: Former     Packs/day: 1.00     Years: 50.00     Pack years: 50.00     Types: Cigarettes    Smokeless tobacco: Never    Tobacco comments:     quit 1 year ago    Vaping Use    Vaping Use: Former    Substances: Always   Substance and Sexual Activity    Alcohol use: No    Drug use: No    Sexual activity: Never   Other Topics Concern    Not on file   Social History Narrative    Not on file     Social Determinants of Health     Financial Resource Strain: Not on file   Food Insecurity: Not on file   Transportation Needs: Not on file   Physical Activity: Not on file   Stress: Not on file   Social Connections: Not on file   Intimate Partner Violence: Not on file   Housing Stability: Not on file       REVIEW OF SYSTEMS: A 12-point review of systemswas obtained and pertinent positives and negatives were enumerated above in the history of present illness.  All other reviewed systems / symptoms were negative. Review of Systems   Constitutional:  Negative for appetite change, fatigue and unexpected weight change. HENT:  Negative for trouble swallowing. Respiratory:  Negative for cough, choking, shortness of breath and wheezing. Cardiovascular:  Negative for chest pain, palpitations and leg swelling. Gastrointestinal:  Positive for abdominal pain and diarrhea. Negative for abdominal distention, anal bleeding, blood in stool, constipation, nausea, rectal pain and vomiting. Genitourinary:  Negative for difficulty urinating. Allergic/Immunologic: Negative for environmental allergies and food allergies. Neurological:  Negative for dizziness, weakness, light-headedness, numbness and headaches. Hematological:  Bruises/bleeds easily. Psychiatric/Behavioral:  Negative for sleep disturbance. The patient is not nervous/anxious.           LABORATORY DATA: Reviewed  Lab Results   Component Value Date    WBC 6.1 08/17/2022    HGB 13.9 08/17/2022    HCT 42.6 08/17/2022    MCV 89.3 08/17/2022     08/17/2022     08/17/2022    K 3.9 08/17/2022     08/17/2022    CO2 24 08/17/2022    BUN 10 08/17/2022    CREATININE 0.91 (H) 08/17/2022    LABALBU 4.2 08/17/2022    BILITOT 0.42 08/17/2022    ALKPHOS 116 (H) 08/17/2022    AST 31 08/17/2022    ALT 31 08/17/2022    INR 1.0 05/06/2022         Lab Results   Component Value Date    RBC 4.77 08/17/2022    HGB 13.9 08/17/2022    MCV 89.3 08/17/2022    MCH 29.1 08/17/2022    MCHC 32.6 08/17/2022    RDW 14.6 (H) 08/17/2022    MPV 11.2 08/17/2022    BASOPCT 1 08/17/2022    LYMPHSABS 1.91 08/17/2022    MONOSABS 0.92 08/17/2022    NEUTROABS 2.84 08/17/2022    EOSABS 0.31 08/17/2022    BASOSABS 0.05 08/17/2022         DIAGNOSTIC TESTING:     CT ABDOMEN PELVIS WO CONTRAST Additional Contrast? None    Result Date: 7/31/2022  EXAMINATION: CT OF THE ABDOMEN AND PELVIS WITHOUT CONTRAST 7/31/2022 3:32 pm TECHNIQUE: CT of the abdomen and pelvis was performed without the administration of intravenous contrast. Multiplanar reformatted images are provided for review. Automated exposure control, iterative reconstruction, and/or weight based adjustment of the mA/kV was utilized to reduce the radiation dose to as low as reasonably achievable. COMPARISON: Abdomen and pelvis CT 05/06/2022 HISTORY: ORDERING SYSTEM PROVIDED HISTORY: Abdominal distention, diffuse lower abdominal pain, no bowel movement x3 days TECHNOLOGIST PROVIDED HISTORY: Abdominal distention, diffuse lower abdominal pain, no bowel movement x3 days Decision Support Exception - unselect if not a suspected or confirmed emergency medical condition->Emergency Medical Condition (MA) Reason for Exam: Abdominal distention, diffuse lower abdominal pain, no bowel movement x3 days FINDINGS: Lack of intravenous contrast administration, partially limiting evaluation of the soft tissues and vasculature. LOWER CHEST:  Trace bilateral pleural effusions with minimal passive atelectasis in the overlying bilateral lower lobes. Linear opacities in each lung base due to scarring or subsegmental atelectasis. Mild cardiomegaly. Trace pericardial effusion. ORGANS:  Mild enlargement of the right hemiliver suggestive of a normal variant Riedel's lobe. Surgically absent gallbladder and right kidney. No intrahepatic nor extrahepatic biliary dilation. Moderate to severe pancreatic atrophy. 0.3 cm simple appearing cyst in the posterior lower pole of the left kidney (Bosniak category 2). Unchanged mild left hydronephrosis likely due to chronic left ureteropelvic junction obstruction. Normal appearance of the spleen and adrenal glands. GI/BOWEL:  Fluid within the distended distal thoracic esophagus, a potential risk factor for aspiration. Small to moderate sliding hiatal hernia. Otherwise normal course and caliber of the stomach, small bowel, colon, and rectum without obstruction.   No visualization of the appendix, reportedly surgically absent. Mild to moderate stool predominantly in the right hemicolon. PELVIS:  Partially obscured by streak artifact from a right hip prosthesis. Surgically absent uterus and ovaries. Normal appearance of the urinary bladder. PERITONEUM/RETROPERITONEUM:  Mild to moderate systemic atherosclerotic calcifications. No abdominal nor pelvic lymphadenopathy. No free intraperitoneal fluid nor gas. SOFT TISSUES/BONES:  Laxity of the anterior and lateral abdominal wall musculature with diastasis recti. No inguinal lymphadenopathy. Partially included changes of right hip total arthroplasty with no evident complication. Diffuse bony demineralization. No acute fractures nor suspicious bony lesions. 1. No acute findings in the abdomen or pelvis. 2. Trace bilateral pleural effusions and trace pericardial effusion. 3. Incidental findings as above for which no dedicated follow-up is recommended. XR ABDOMEN (KUB) (SINGLE AP VIEW)    Result Date: 7/31/2022  EXAMINATION: ONE SUPINE XRAY VIEW(S) OF THE ABDOMEN 7/31/2022 9:56 pm COMPARISON: 05/08/2022 HISTORY: ORDERING SYSTEM PROVIDED HISTORY: R/O ileus and obstrcution TECHNOLOGIST PROVIDED HISTORY: R/O ileus and obstrcution FINDINGS: There is gaseous dilatation of the stomach. The small bowel and colon are not dilated. There is moderate constipation of the colon. There is no large focal bolus over the rectum. Supine position prevents evaluation of air-fluid levels and pneumoperitoneum. Evaluation for calcifications in the soft tissues are suboptimal. Coarse markings in the lungs. The bones are osteopenic with degenerative changes throughout the spine. Right hip arthroplasty is present. Gaseous dilatation of the stomach. Patient may benefit from an NG tube. Moderate constipation of the colon without a large focal bolus in the rectum. The small bowel and colon are not dilated.      CT HEAD WO CONTRAST    Result Date: 7/31/2022  EXAMINATION: CT OF THE HEAD WITHOUT CONTRAST  7/31/2022 3:32 pm TECHNIQUE: CT of the head was performed without the administration of intravenous contrast. Automated exposure control, iterative reconstruction, and/or weight based adjustment of the mA/kV was utilized to reduce the radiation dose to as low as reasonably achievable. COMPARISON: 11/24/2021 MRI. HISTORY: ORDERING SYSTEM PROVIDED HISTORY: Altered coming from care facility. TECHNOLOGIST PROVIDED HISTORY: Altered coming from care facility. Decision Support Exception - unselect if not a suspected or confirmed emergency medical condition->Emergency Medical Condition (MA) Reason for Exam: Altered coming from care facility. FINDINGS: BRAIN/VENTRICLES: There is no acute intracranial hemorrhage, mass effect or midline shift. No abnormal extra-axial fluid collection. The gray-white differentiation is maintained without evidence of an acute infarct. Involutional parenchymal changes. There is no evidence of hydrocephalus. ORBITS: The visualized portion of the orbits demonstrate no acute abnormality. Bilateral lens replacement. SINUSES: The visualized paranasal sinuses and mastoid air cells demonstrate no acute abnormality. SOFT TISSUES/SKULL:  No acute abnormality of the visualized skull or soft tissues. No acute intracranial abnormality. US RENAL COMPLETE    Result Date: 8/1/2022  EXAMINATION: RETROPERITONEAL ULTRASOUND OF THE KIDNEYS AND URINARY BLADDER 8/1/2022 COMPARISON: CT abdomen pelvis from 07/31/2022 HISTORY: ORDERING SYSTEM PROVIDED HISTORY: BENEDICT TECHNOLOGIST PROVIDED HISTORY: BENEDICT 44-year-old female with history of acute kidney injury FINDINGS: Kidneys: Right kidney measures prior right nephrectomy. Left kidney measures 14.5 x 6.5 x 5.6 cm. Left renal cortical thickness measures 2 cm. No hydronephrosis or perinephric fluid. Tiny 1.2 cm cortical cyst at the lower pole left kidney. Gross preservation of the left-sided corticomedullary differentiation.  Bladder: Urinary bladder measures 6.7 x 5.1 by 3.2 cm for a bladder volume of 57 mL. Left ureteral jet is visualized. Patient had no urge to void during the exam.     1. Tiny 1.2 cm lower pole left renal cortical cyst. 2. No overt left-sided hydronephrosis. Left ureteral jet is visualized. 3. Prior right nephrectomy. CT ABDOMEN PELVIS W IV CONTRAST Additional Contrast? None    Result Date: 8/17/2022  EXAMINATION: CT OF THE ABDOMEN AND PELVIS WITH CONTRAST 8/17/2022 3:57 pm TECHNIQUE: CT of the abdomen and pelvis was performed with the administration of intravenous contrast. Multiplanar reformatted images are provided for review. Automated exposure control, iterative reconstruction, and/or weight based adjustment of the mA/kV was utilized to reduce the radiation dose to as low as reasonably achievable. COMPARISON: 08/03/2022 HISTORY: ORDERING SYSTEM PROVIDED HISTORY: abdominal pain, worsening TECHNOLOGIST PROVIDED HISTORY: abdominal pain, worsening Decision Support Exception - unselect if not a suspected or confirmed emergency medical condition->Emergency Medical Condition (MA) Reason for Exam: abdominal pain, worsening FINDINGS: Lower Chest: Parenchymal bands of atelectasis noted in the lower lungs bilaterally. Base of the heart is unremarkable. Visualized extra thoracic soft tissues are unremarkable. Organs: Liver enhances normally. Portal vein is patent. Gallbladder is surgically absent. Spleen unremarkable. Adrenals unremarkable. Pancreas unremarkable. The right kidney is absent. Left kidney is unremarkable in appearance. GI/Bowel: There is mild mural thickening involving the distal esophagus, with mild surrounding fat stranding. A small hiatal hernia is present. Stomach otherwise unremarkable. Small bowel unremarkable. The appendix is not well visualized. No asymmetric pericecal inflammation is seen to suggest acute appendicitis. No large bowel abnormalities are identified.  Pelvis: Uterus is surgically absent. Urinary bladder is unremarkable. Overall, evaluation of the pelvis is limited by streak artifact generated by the right hip prosthesis. Peritoneum/Retroperitoneum: Abdominal aorta normal in caliber. Superior mesenteric artery is enhancing. No lymphadenopathy is identified. Bones/Soft Tissues: No acute or suspicious bony abnormalities are identified. The extra-abdominal and extra pelvic soft tissues are unremarkable. Mild mural thickening involving the distal esophagus, suggesting esophagitis. That should be followed to resolution. Otherwise, no acute abnormalities are identified to explain abdominal pain. CT ABDOMEN PELVIS W IV CONTRAST Additional Contrast? Oral    Result Date: 8/3/2022  EXAMINATION: CT OF THE ABDOMEN AND PELVIS WITH CONTRAST 8/3/2022 1:18 pm TECHNIQUE: CT of the abdomen and pelvis was performed with the administration of intravenous contrast. Multiplanar reformatted images are provided for review. Automated exposure control, iterative reconstruction, and/or weight based adjustment of the mA/kV was utilized to reduce the radiation dose to as low as reasonably achievable. COMPARISON: 07/31/2022 HISTORY: ORDERING SYSTEM PROVIDED HISTORY: right sided abdomnial tenderness TECHNOLOGIST PROVIDED HISTORY: right sided abdomnial tenderness Reason for Exam: abdominal pain body aches FINDINGS: LOWER CHEST:  Visualized portion of the lower chest demonstrates no acute abnormality. Axial hiatal hernia. KIDNEYS AND URINARY TRACT: No renal calculi are identified. There is no evidence for hydronephrosis. The ureters are of normal course and caliber. Simple cyst arising from the lower pole of left kidney. Right kidney is not visualized ORGANS: Status post cholecystectomy. Visualized portions of the liver, spleen, pancreas,  and adrenal glands demonstrate no acute abnormality. GI/BOWEL: No bowel obstruction. No evidence of acute appendicitis.   Moderate amount of wall thickening of the rectum and sigmoid colon likely related to colitis. PELVIS: The bladder and pelvic organs are unremarkable. PERITONEUM/RETROPERITONEUM: No free air or free fluid is noted. No pathologically enlarged lymphadenopathy. The vasculature do not demonstrate acute abnormality. BONES/SOFT TISSUES: The osseous structures demonstrate no acute abnormality. Changes related to right hip arthroplasty. Moderate levoscoliosis with tip L1-L2. Since prior examination, interval development of moderate wall thickening within the rectum and sigmoid colon likely suggestive of proctocolitis. Status post cholecystectomy. Right kidney is not visualized. Axial hiatal hernia. XR CHEST PORTABLE    Result Date: 7/31/2022  EXAMINATION: ONE XRAY VIEW OF THE CHEST 7/31/2022 11:47 am COMPARISON: 05/06/2022 HISTORY: ORDERING SYSTEM PROVIDED HISTORY: sepsis rule out, harsh cough TECHNOLOGIST PROVIDED HISTORY: sepsis rule out, harsh cough Reason for Exam: port upright FINDINGS: The lungs are without acute focal process. There is no effusion or pneumothorax. The cardiomediastinal silhouette is stable. The osseous structures are stable. No acute process. PHYSICAL EXAMINATION:     [ INSTRUCTIONS:  \"[x]\" Indicates a positive item  \"[]\" Indicates a negative item  -- DELETE ALL ITEMS NOT EXAMINED]  Vital Signs: (As obtained by patient/caregiver or practitioner observation)    Blood pressure-  Heart rate-    Respiratory rate-    Temperature-  Pulse oximetry-     Constitutional: [x] Appears well-developed and well-nourished [x] No apparent distress      [] Abnormal-   Mental status  [] Alert and awake  [] Oriented to person/place/time []Able to follow commands      Eyes:  EOM    [x]  Normal  [] Abnormal-  Sclera  [x]  Normal  [] Abnormal -         Discharge [x]  None visible  [] Abnormal -    HENT:   [x] Normocephalic, atraumatic.   [] Abnormal   [x] Mouth/Throat: Mucous membranes are moist.     External Ears [x] Normal  [] Abnormal-     Neck: [x] No visualized mass     Pulmonary/Chest: [x] Respiratory effort normal.  [x] No visualized signs of difficulty breathing or respiratory distress        [] Abnormal-      Musculoskeletal:   [x] Normal gait with no signs of ataxia         [x] Normal range of motion of neck        [] Abnormal-       Neurological:        [x] No Facial Asymmetry (Cranial nerve 7 motor function) (limited exam to video visit)          [x] No gaze palsy        [] Abnormal-         Skin:        [x] No significant exanthematous lesions or discoloration noted on facial skin         [] Abnormal-            Psychiatric:       [x] Normal Affect [x] No Hallucinations        [] Abnormal-     Other pertinent observable physical exam findings-         IMPRESSION: Ms. Tasha Frey is a 68 y.o. female with      Diagnosis Orders   1. Abnormal CT scan, esophagus  EGD      2. Diarrhea of presumed infectious origin  Clostridium Difficile Toxin/Antigen    Gastrointestinal Panel, Molecular      3. Anemia, blood loss        4. S/P Nissen fundoplication (without gastrostomy tube) procedure        5. Elevated liver enzymes        6. Biliary stent obstruction, subsequent encounter        7. Esophageal dilatation        8. Gastric outlet obstruction              Will proceed with the above  Told her to rehydrate at this time  Based on the result of the stool studies we will plan the management      Diet/life style/natural hx /complication of the dx were all explained in details   Past medical, past surgical, social history, psychiatric history, medications or allergies, all reviewed and  updated    Vick Barreto is a 68 y.o. female being evaluated by a Virtual Visit (video visit) encounter to address concerns as mentioned above. A caregiver was present when appropriate.  Due to this being a TeleHealth encounter (During EVK-31 public [facility] emergency), evaluation of the following organ systems was limited: Vitals/Constitutional/EENT/Resp/CV/GI//MS/Neuro/Skin/Heme-Lymph-Imm. Pursuant to the emergency declaration under the [address] waiver authority and the [employer] Act, this Virtual Visit was conducted with patient's (and/or legal guardian's) consent, to reduce the patient's risk of exposure to COVID-19 and provide necessary medical care. The patient (and/or legal guardian) has also been advised to contact this office for worsening conditions or problems, and seek emergency medical treatment and/or call 911 if deemed necessary. Services were provided through a video synchronous discussion virtually to substitute for in-person clinic visit. Patient and provider were located at their individual homes. -- on 8/25/2022 at 4:08 PM    Total Time: minutes: 21-30 minutes    Services were provided through a video synchronous discussion virtually to substitute for in-person clinic visit. Thank you for allowing me to participate in the care of Ms. Linda Zurita. For any further questions please do not hesitate to contact me. I have reviewed and agree with the ROS entered by the MA/RN. Note is dictated utilizing voice recognition software. Unfortunately this leads to occasional typographical errors. Please contact our office if you have any questions. An electronic signature was used to authenticate this note.         Juan Levin MD  [facility]  O: #[phone]

## 2022-08-29 ENCOUNTER — TELEPHONE (OUTPATIENT)
Dept: GASTROENTEROLOGY | Age: 77
End: 2022-08-29

## 2022-08-30 PROBLEM — N39.0 UTI (URINARY TRACT INFECTION): Status: RESOLVED | Noted: 2022-07-31 | Resolved: 2022-08-30

## 2022-08-31 NOTE — TELEPHONE ENCOUNTER
Writer called pt [facility] and talked to pt nurse Raúl, EGD was schedule as follows;    [address] [facility] [place] Thursday [email or web address] EGD [email or web address]    Writer reviewed EGD prep instructions with Raúl over phone and copies were faxed to [facility] at [phone]. Writer also notified pt son Deepa Pham regarding date/time of EGD.

## 2022-09-01 ENCOUNTER — HOSPITAL ENCOUNTER (OUTPATIENT)
Dept: PREADMISSION TESTING | Age: 77
Discharge: HOME | End: 2022-09-05

## 2022-09-01 VITALS — BODY MASS INDEX: 32.52 KG/M2 | WEIGHT: 176.7 LBS | HEIGHT: 62 IN

## 2022-09-01 RX ORDER — OXYCODONE HYDROCHLORIDE AND ACETAMINOPHEN 5; 325 MG/1; MG/1
1 TABLET ORAL EVERY 8 HOURS PRN
COMMUNITY

## 2022-09-01 NOTE — PROGRESS NOTES

## 2022-09-07 ENCOUNTER — ANESTHESIA EVENT (OUTPATIENT)
Dept: ENDOSCOPY | Age: 77
End: 2022-09-07
Payer: MEDICARE

## 2022-09-08 ENCOUNTER — ANESTHESIA (OUTPATIENT)
Dept: ENDOSCOPY | Age: 77
End: 2022-09-08
Payer: MEDICARE

## 2022-09-08 ENCOUNTER — HOSPITAL ENCOUNTER (OUTPATIENT)
Age: 77
Setting detail: OUTPATIENT SURGERY
Discharge: HOME | End: 2022-09-08
Attending: INTERNAL MEDICINE | Admitting: INTERNAL MEDICINE
Payer: MEDICARE

## 2022-09-08 ENCOUNTER — TELEPHONE (OUTPATIENT)
Dept: GASTROENTEROLOGY | Age: 77
End: 2022-09-08

## 2022-09-08 VITALS
WEIGHT: 176 LBS | OXYGEN SATURATION: 96 % | HEIGHT: 62 IN | BODY MASS INDEX: 32.39 KG/M2 | SYSTOLIC BLOOD PRESSURE: 131 MMHG | TEMPERATURE: 97.3 F | DIASTOLIC BLOOD PRESSURE: 86 MMHG | HEART RATE: 69 BPM | RESPIRATION RATE: 20 BRPM

## 2022-09-08 DIAGNOSIS — R93.89 ABNORMAL CT SCAN: ICD-10-CM

## 2022-09-08 DIAGNOSIS — R10.84 GENERALIZED ABDOMINAL PAIN: Primary | ICD-10-CM

## 2022-09-08 DIAGNOSIS — R19.8 ABNORMAL FINDINGS ON ESOPHAGOGASTRODUODENOSCOPY (EGD): ICD-10-CM

## 2022-09-08 DIAGNOSIS — R58 BLEEDING: ICD-10-CM

## 2022-09-08 PROCEDURE — 2500000003 HC RX 250 WO HCPCS: Performed by: NURSE ANESTHETIST, CERTIFIED REGISTERED

## 2022-09-08 PROCEDURE — 3609012400 HC EGD TRANSORAL BIOPSY SINGLE/MULTIPLE: Performed by: INTERNAL MEDICINE

## 2022-09-08 PROCEDURE — 88305 TISSUE EXAM BY PATHOLOGIST: CPT

## 2022-09-08 PROCEDURE — 2580000003 HC RX 258: Performed by: ANESTHESIOLOGY

## 2022-09-08 PROCEDURE — 7100000010 HC PHASE II RECOVERY - FIRST 15 MIN: Performed by: INTERNAL MEDICINE

## 2022-09-08 PROCEDURE — 2709999900 HC NON-CHARGEABLE SUPPLY: Performed by: INTERNAL MEDICINE

## 2022-09-08 PROCEDURE — 7100000011 HC PHASE II RECOVERY - ADDTL 15 MIN: Performed by: INTERNAL MEDICINE

## 2022-09-08 PROCEDURE — 43239 EGD BIOPSY SINGLE/MULTIPLE: CPT | Performed by: INTERNAL MEDICINE

## 2022-09-08 PROCEDURE — 3700000001 HC ADD 15 MINUTES (ANESTHESIA): Performed by: INTERNAL MEDICINE

## 2022-09-08 PROCEDURE — 6360000002 HC RX W HCPCS: Performed by: NURSE ANESTHETIST, CERTIFIED REGISTERED

## 2022-09-08 PROCEDURE — 88342 IMHCHEM/IMCYTCHM 1ST ANTB: CPT

## 2022-09-08 PROCEDURE — 3700000000 HC ANESTHESIA ATTENDED CARE: Performed by: INTERNAL MEDICINE

## 2022-09-08 RX ORDER — PROPOFOL 10 MG/ML
INJECTION, EMULSION INTRAVENOUS PRN
Status: DISCONTINUED | OUTPATIENT
Start: 2022-09-08 | End: 2022-09-08 | Stop reason: SDUPTHER

## 2022-09-08 RX ORDER — DIPHENHYDRAMINE HYDROCHLORIDE 50 MG/ML
12.5 INJECTION INTRAMUSCULAR; INTRAVENOUS
Status: DISCONTINUED | OUTPATIENT
Start: 2022-09-08 | End: 2022-09-08 | Stop reason: HOSPADM

## 2022-09-08 RX ORDER — SODIUM CHLORIDE 0.9 % (FLUSH) 0.9 %
5-40 SYRINGE (ML) INJECTION EVERY 12 HOURS SCHEDULED
Status: DISCONTINUED | OUTPATIENT
Start: 2022-09-08 | End: 2022-09-08 | Stop reason: HOSPADM

## 2022-09-08 RX ORDER — SODIUM CHLORIDE 9 MG/ML
INJECTION, SOLUTION INTRAVENOUS PRN
Status: DISCONTINUED | OUTPATIENT
Start: 2022-09-08 | End: 2022-09-08 | Stop reason: HOSPADM

## 2022-09-08 RX ORDER — LIDOCAINE HYDROCHLORIDE 10 MG/ML
1 INJECTION, SOLUTION EPIDURAL; INFILTRATION; INTRACAUDAL; PERINEURAL
Status: DISCONTINUED | OUTPATIENT
Start: 2022-09-08 | End: 2022-09-08 | Stop reason: HOSPADM

## 2022-09-08 RX ORDER — SODIUM CHLORIDE 0.9 % (FLUSH) 0.9 %
5-40 SYRINGE (ML) INJECTION PRN
Status: DISCONTINUED | OUTPATIENT
Start: 2022-09-08 | End: 2022-09-08 | Stop reason: HOSPADM

## 2022-09-08 RX ORDER — LIDOCAINE HYDROCHLORIDE 20 MG/ML
INJECTION, SOLUTION EPIDURAL; INFILTRATION; INTRACAUDAL; PERINEURAL PRN
Status: DISCONTINUED | OUTPATIENT
Start: 2022-09-08 | End: 2022-09-08 | Stop reason: SDUPTHER

## 2022-09-08 RX ORDER — SODIUM CHLORIDE, SODIUM LACTATE, POTASSIUM CHLORIDE, CALCIUM CHLORIDE 600; 310; 30; 20 MG/100ML; MG/100ML; MG/100ML; MG/100ML
INJECTION, SOLUTION INTRAVENOUS CONTINUOUS
Status: DISCONTINUED | OUTPATIENT
Start: 2022-09-08 | End: 2022-09-08 | Stop reason: HOSPADM

## 2022-09-08 RX ORDER — SIMVASTATIN 10 MG
40 TABLET ORAL NIGHTLY
Status: ON HOLD | COMMUNITY
Start: 2022-08-06 | End: 2022-09-08

## 2022-09-08 RX ORDER — FENTANYL CITRATE 50 UG/ML
25 INJECTION, SOLUTION INTRAMUSCULAR; INTRAVENOUS EVERY 5 MIN PRN
Status: DISCONTINUED | OUTPATIENT
Start: 2022-09-08 | End: 2022-09-08 | Stop reason: HOSPADM

## 2022-09-08 RX ORDER — ONDANSETRON 2 MG/ML
4 INJECTION INTRAMUSCULAR; INTRAVENOUS
Status: DISCONTINUED | OUTPATIENT
Start: 2022-09-08 | End: 2022-09-08 | Stop reason: HOSPADM

## 2022-09-08 RX ORDER — ALBUTEROL SULFATE 2.5 MG/3ML
2.5 SOLUTION RESPIRATORY (INHALATION) EVERY 6 HOURS PRN
COMMUNITY

## 2022-09-08 RX ADMIN — LIDOCAINE HYDROCHLORIDE 80 MG: 20 INJECTION, SOLUTION EPIDURAL; INFILTRATION; INTRACAUDAL; PERINEURAL at 11:32

## 2022-09-08 RX ADMIN — SODIUM CHLORIDE, POTASSIUM CHLORIDE, SODIUM LACTATE AND CALCIUM CHLORIDE: 600; 310; 30; 20 INJECTION, SOLUTION INTRAVENOUS at 10:38

## 2022-09-08 RX ADMIN — PROPOFOL 220 MG: 10 INJECTION, EMULSION INTRAVENOUS at 11:32

## 2022-09-08 ASSESSMENT — ENCOUNTER SYMPTOMS
SINUS PAIN: 0
SINUS PRESSURE: 0
SHORTNESS OF BREATH: 1
SORE THROAT: 0
TROUBLE SWALLOWING: 0
SHORTNESS OF BREATH: 0
WHEEZING: 0
BACK PAIN: 0
COUGH: 0
CHEST TIGHTNESS: 0
APNEA: 0
RHINORRHEA: 0

## 2022-09-08 ASSESSMENT — PAIN - FUNCTIONAL ASSESSMENT
PAIN_FUNCTIONAL_ASSESSMENT: ACTIVITIES ARE NOT PREVENTED
PAIN_FUNCTIONAL_ASSESSMENT: 0-10

## 2022-09-08 ASSESSMENT — PAIN DESCRIPTION - DESCRIPTORS: DESCRIPTORS: ACHING;SHARP

## 2022-09-08 NOTE — ANESTHESIA POSTPROCEDURE EVALUATION
Department of Anesthesiology  Postprocedure Note    Patient: Natalia Boyer  MRN: [id number]  [place]: 1945  Date of evaluation: 9/8/2022      Procedure Summary     Date: 09/08/22 Room / Location: [facility] ENDO 04 / [facility] ENDO    Anesthesia Start: 1127 Anesthesia Stop: 6493    Procedure: EGD BIOPSY (Esophagus) Diagnosis:       Abnormal CT scan      Bleeding      (ABNORMAL CT  BLEEDINGPOST- ANESTHESIA EVALUATION       Pt Name: Natalia Boyer  MRN: [id number]  YOB: 1945  Date of evaluation: 9/8/2022  Time:  12:56 PM      /86   Pulse 69   Temp 97.3 °F (36.3 °C) (Infrared)   Resp 20   Ht 5' 2\" (1.575 m)   Wt 176 lb (79.8 kg)   SpO2 96%   BMI 32.19 kg/m²      Consciousness Level  Awake  Cardiopulmonary Status  Stable  Pain Adequately Treated YES  Nausea / Vomiting  NO  Adequate Hydration  YES  Anesthesia Related Complications NONE      Electronically signed by Diana Sommers MD on 9/8/2022 at 12:56 PM           Surgeons: Lizbeth Carrillo MD Responsible Provider: Diana Sommers MD    Anesthesia Type: general ASA Status: 3          Anesthesia Type: No value filed.     Alhaji Phase I:      Alhaji Phase II: Alhaji Score: 9      Anesthesia Post Evaluation

## 2022-09-08 NOTE — OP NOTE
PROCEDURE NOTE    DATE OF PROCEDURE: 9/8/2022     SURGEON: Lizbeth Carrillo MD  Facility: [facility]  ASSISTANT: None  Anesthesia: MAC  PREOPERATIVE DIAGNOSIS:   Abnormal CT scan of the esophagus  Abdominal pain  GERD    Diagnosis:  Inflammation and thickening on the distal esophagus with the little bit narrowing from the wrap, biopsies were taken    Severe inflammation of the stomach with nodular mucosa throughout the entire colon hard to clean  Biopsies were taken from the upper stomach      The pyloric orifice is surrounded with erythematous hard nodules with mucosal thickening I went ahead and took multiple biopsies from that            POSTOPERATIVE DIAGNOSIS: As described below    OPERATION: Upper GI endoscopy with Biopsy    ANESTHESIA: Moderate Sedation     ESTIMATED BLOOD LOSS: Less than 50 ml    COMPLICATIONS: None. SPECIMENS:  Was Obtained:     As above     HISTORY: The patient is a 68y.o. year old female with history of above preop diagnosis. I recommended esophagogastroduodenoscopy with possible biopsy and I explained the risk, benefits, expected outcome, and alternatives to the procedure. Risks included but are not limited to bleeding, infection, respiratory distress, hypotension, and perforation of the esophagus, stomach, or duodenum. Patient understands and is in agreement. The patient was counseled at length about the risks of jhoan Covid-19 during their perioperative period and any recovery window from their procedure. The patient was made aware that jhoan Covid-19  may worsen their prognosis for recovering from their procedure  and lend to a higher morbidity and/or mortality risk. All material risks, benefits, and reasonable alternatives including postponing the procedure were discussed. The patient does wish to proceed with the procedure at this time. PROCEDURE: The patient was given IV conscious sedation.   The patient's SPO2 remained above 90% throughout the procedure. The gastroscope was inserted orally and advanced under direct vision through the esophagus, through the stomach, through the pylorus, and into the descending duodenum. Post sedation note : The patient's SPO2 remained above 90% throughout the procedure. the vital signs remained stable , and no immediate complication form the procedure noted, patient will be ready for d/c when criteria is met . Findings:    Retropharyngeal area was grossly normal appearing    Esophagus: abnormal: Inflammation and thickening on the distal esophagus with the little bit narrowing from the wrap, biopsies were taken      Stomach:    Severe inflammation of the stomach with nodular mucosa throughout the entire colon hard to clean  Biopsies were taken from the upper stomach      The pyloric orifice is surrounded with erythematous hard nodules with mucosal thickening I went ahead and took multiple biopsies from that    Duodenum:     Descending: normal    Bulb: normal    The scope was removed and the patient tolerated the procedure well.      Recommendations/Plan:   F/U Biopsies  High-dose PPI  Repeat EGD in 4 to 6 weeks  F/U In Office in 3-4 weeks  Discussed with the family    Electronically signed by Alonso Reyes MD  on 9/8/2022 at 11:48 AM

## 2022-09-08 NOTE — H&P
HISTORY and [address]       NAME:  Lucian Tillman  MRN: [id number]   YOB: 1945   Date: 9/8/2022   Age: 68 y.o. Gender: female       COMPLAINT AND PRESENT HISTORY:   Lucian Tillman  is a 68 y.o. female presenting today for EGD BIOPSY as r/t ABNORMAL CT  BLEEDING. Pt reports pain in her abdomen for about a month, states she was also having diarrhea that has now turned into constipation. She reports lower abdominal pain to RUQ pain 6/10 in severity, aching and stabbing in nature, constant. States percocet helps with pain. She denies any bloody or tarry stools. She does reports constant nausea without vomiting. She denies any dysphagia, sore throat or voice change. She does reports GERD for which she takes protonix. Pt has a PMHX significant for afib-denies , COPD, MVP, HLD, HTN,         NPO since midnight. Sip of water with BP med. Pt does not wear dentures. Pt denies any hx of MRSA infection  Pt not currently taking any blood thinners or anticoagulants  Pt denies any personal or FHx of complications with anesthesia. Pt denies any acute symptoms of illness at this time including no SOB, CP, fever, URI or UTI symptoms. RECENT IMAGING    CT ABDOMEN PELVIS W IV CONTRAST Additional Contrast? None    Result Date: 8/17/2022  Mild mural thickening involving the distal esophagus, suggesting esophagitis. That should be followed to resolution. Otherwise, no acute abnormalities are identified to explain abdominal pain.         PAST MEDICAL HISTORY     Past Medical History:   Diagnosis Date    A-fib ([address]     Agoraphobia without history of panic disorder     Anxiety     Arthritis     Back pain     Bronchitis     Caffeine use     8 coffee / day    Carpal tunnel syndrome     Cataracts, bilateral     Constipation     COPD (chronic obstructive pulmonary disease) (HCC)     Emphysema    Depression     Diarrhea     GERD (gastroesophageal reflux disease)     H/O gastric ulcer HTN (hypertension)     Hyperlipidemia     Mumps     MVP (mitral valve prolapse)     Dr. Lynnell Apgar in May 2019    Recurrent UTI     Dr. Peri Escamilla    S/P PICC central line placement 2022    \"no longer present\" per Grover Milligan (Nurse) at [facility] [place]. Sepsis ([address]     Sinusitis     Tracheostomy in place [facility])     \"No longer present\" per Grover Milligan (Nurse) at [facility] of [place].     Ulcerative esophagitis     Wellness examination     Dr. Brad Segundo seen in 2020       SURGICAL HISTORY       Past Surgical History:   Procedure Laterality Date    APPENDECTOMY      CARPAL TUNNEL RELEASE      CHOLECYSTECTOMY, LAPAROSCOPIC N/A 2020    XI LAPAROSCOPIC ROBOTIC CHOLECYSTECTOMY, PYLOROPLASTY performed by Aaron Michael MD at [place] [address]      COLONOSCOPY N/A 2022    COLONOSCOPY POLYPECTOMY HOT performed by Janis Rosa MD at [place] [address]      ERCP  2020    with stent insertion, balloon dilation, sphinctereotomy    ERCP  2020    ** CASE IN OR WITY GI STAFF** ERCP STENT INSERTION performed by Janis Rosa MD at [facility]    ERCP  2020    ** CASE IN OR WITH GI STAFF**ERCP SPHINCTER/PAPILLOTOMY performed by Janis Rosa MD at [facility]    ERCP  2020    ** CASE IN OR WITH GI STAFF**ERCP DILATION BALLOON performed by Janis Rosa MD at [facility]    ERCP N/A 2021    ERCP STENT REMOVAL performed by Debby Uribe MD at [facility]    ERCP  2021    ERCP STONE REMOVAL performed by Debby Uribe MD at [facility]    GASTRIC FUNDOPLICATION N/A     XI LAPAROSCOPIC ROBOTIC [address], CHERYL FUNDOPLICATION performed [facility] 2020    EGD PEG TUBE PLACEMENT performed by Aaron Michael MD at [address] [facility] N/A 2021    **CASE IN O.R. W/ GI STAFF - EGD WITH REMOVAL PEG TUBE performed by Debby Uribe, MD at [facility]    HAND SURGERY      [facility]. CATH POWER PICC TRIPLE  03/04/2020         HERNIA REPAIR      Hiatal hernia    HYSTERECTOMY (CERVIX STATUS UNKNOWN)      Abdominal    IR NONTUNNELED VASCULAR CATHETER  5/10/2022    IR NONTUNNELED VASCULAR CATHETER 5/10/2022 Urban Pichardo MD [facility]    JOINT REPLACEMENT Right     right hip    OVARY REMOVAL      RHINOPLASTY      TONSILLECTOMY      TOTAL HIP ARTHROPLASTY      TOTAL NEPHRECTOMY      atrophic from infections, age 13    TRACHEOSTOMY N/A 03/27/2020    **ADD ON **WANTS 10:00AM **TRACHEOTOMY performed by Cayla Devi MD at [address] [place] [address] N/A 04/02/2020    TRACHEOTOMY EXCHANGE performed by Cayla Devi MD at [address] [place] [address] 03/17/2020    BEDSIDE EGD ESOPHAGOGASTRODUODENOSCOPY (ICU) performed by Cayla Devi MD at [place] [address] 05/18/2020    EGD BIOPSY performed by Mami Rivers MD at [place] [address]  05/18/2020    EGD DILATION BALLOON performed by Mami Rivers MD at [place] [address] N/A 07/06/2020    ** CASE IN O.R. WITH GI STAFF** EGD ESOPHAGOGASTRODUODENOSCOPY performed by Mena Garland MD at [place] [address] N/A 11/09/2020    EGD DIAGNOSTIC ONLY performed by MD Henry at [place] [address]  02/08/2021    - EGD WITH REMOVAL PEG TUBE,ERCP STENT REMOVAL,ERCP STONE REMOVAL    UPPER GASTROINTESTINAL ENDOSCOPY N/A 11/24/2021    EGD BIOPSY performed by MD Henry at [address] [place] [address] HISTORY       Family History   Problem Relation Age of Onset    Cancer Mother         uterine    Heart Attack Mother     Heart Attack Father     Other Maternal Grandfather         foot gangrene    Other Paternal Grandmother         bleeding ulcers    Other Paternal Grandfather         lung cancer       SOCIAL HISTORY       Social History     Socioeconomic History    Marital status:     Number of children: 2   Occupational History    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   Tobacco Use    Smoking status: Former     Packs/day: 1.00     Years: 50.00     Pack years: 50.00     Types: Cigarettes    Smokeless tobacco: Never    Tobacco comments:     quit 1 year ago    Vaping Use    Vaping Use: Former    Substances: Always   Substance and Sexual Activity    Alcohol use: No    Drug use: No    Sexual activity: Never           REVIEW OF SYSTEMS      Allergies   Allergen Reactions    Prednisone Anxiety    Compazine [Prochlorperazine Maleate]     Penicillin V Potassium     Penicillins Other (See Comments)     Shock- received meropenem and ceftriaxone wo issues 2020       No current facility-administered medications on file prior to encounter. Current Outpatient Medications on File Prior to Encounter   Medication Sig Dispense Refill    carboxymethylcellulose 1 % ophthalmic solution Place 1 drop into both eyes in the morning and 1 drop at noon and 1 drop in the evening and 1 drop before bedtime. Dyphylline-guaiFENesin 100-100 MG/5ML LIQD Take 1 tablet by mouth in the morning and at bedtime      magnesium hydroxide (MILK OF MAGNESIA) 400 MG/5ML suspension Take 30 mLs by mouth Give 30ml by mouth as needed for constipation no BM x3 days      acetaminophen (TYLENOL) 500 MG tablet Take 1,000 mg by mouth in the morning and at bedtime      clotrimazole-betamethasone (LOTRISONE) 1-0.05 % cream Apply topically 2 times daily Apply topically 2 times daily. 15 g 0    Cholecalciferol (VITAMIN D) 50 MCG (2000 UT) CAPS capsule Take 1 capsule by mouth daily 30 capsule 0    guaiFENesin (MUCINEX) 600 MG extended release tablet Take 400 mg by mouth in the morning. Indications: Cough. As needed for mucus.       Lactobacillus TABS Take 1 tablet by mouth 2 times daily Take One tablet by mouth two times daily      fluticasone (FLONASE) 50 MCG/ACT nasal spray 1 spray by Each Nostril route daily      loratadine (CLARITIN) 10 MG capsule Take 10 mg by mouth daily      clonazePAM (KLONOPIN) 0.5 MG tablet Take 0.5 mg by mouth 3 times daily as needed (tid). bisacodyl (DULCOLAX) 5 MG EC tablet TAKE 2 TABS AS INSTRUCTED BY THE OFFICE THE DAY PRIOR TO COLONOSCOPY 2 tablet 0    metoclopramide (REGLAN) 5 MG tablet Take 1 tablet by mouth 4 times daily 120 tablet 3    polyethylene glycol (GLYCOLAX) 17 g packet Take 17 g by mouth daily as needed for Constipation 527 g 1    QUEtiapine (SEROQUEL) 100 MG tablet Take 1 tablet by mouth nightly for 3 days 3 tablet 0    QUEtiapine (SEROQUEL) 25 MG tablet Take 1 tablet by mouth every morning (before breakfast) for 3 days (Patient taking differently: Take 50 mg by mouth every morning (before breakfast)) 3 tablet 0    gabapentin (NEURONTIN) 300 MG capsule Take 1 capsule by mouth 3 times daily for 3 days. 9 capsule 0    escitalopram (LEXAPRO) 10 MG tablet Take 20 mg by mouth in the morning. Folic Acid-Cholecalciferol 1-2000 MG-UNIT TABS Take by mouth      busPIRone (BUSPAR) 10 MG tablet Take 1 tablet by mouth 2 times daily  0    pantoprazole (PROTONIX) 40 MG tablet Take 1 tablet by mouth 2 times daily 60 tablet 0    bisacodyl (DULCOLAX) 10 MG suppository Place 10 mg rectally daily as needed for Constipation PRN for constipation no BM x4 days.       Sodium Phosphates (FLEET) 7-19 GM/118ML Place 1 enema rectally As needed after no BM for 5 days      budesonide-formoterol (SYMBICORT) 160-4.5 MCG/ACT AERO Inhale 2 puffs into the lungs 2 times daily      ferrous sulfate (FE TABS 325) 325 (65 Fe) MG EC tablet Take 1 tablet by mouth daily (with breakfast) 90 tablet 3    amitriptyline (ELAVIL) 25 MG tablet Take 1 tablet by mouth nightly (Patient taking differently: Take 25 mg by mouth in the morning, at noon, and at bedtime)      metoprolol succinate (TOPROL XL) 25 MG extended release tablet Take 1 tablet by mouth daily 30 tablet 3    sucralfate (CARAFATE) 1 GM tablet Take 1 tablet by mouth 4 times daily 120 tablet 3    Oyster Shell 500 MG TABS Take 500 mg by mouth 2 times daily       simvastatin (ZOCOR) 40 MG tablet Take 40 mg by mouth nightly. Review of Systems   Constitutional:  Positive for fatigue. Negative for chills, diaphoresis and fever. Just doesn't feel \"good\"  Breaks out in \"sweats\"   HENT:  Positive for congestion, dental problem and postnasal drip. Negative for ear pain, rhinorrhea, sinus pressure, sinus pain, sore throat and trouble swallowing. Upper partial    Eyes:  Positive for visual disturbance. Respiratory:  Negative for apnea, cough, chest tightness, shortness of breath and wheezing. Cardiovascular:  Negative for chest pain, palpitations and leg swelling. Gastrointestinal:         SEE HPI    Genitourinary:  Negative for dysuria, flank pain, frequency and hematuria. Musculoskeletal:  Positive for arthralgias and gait problem. Negative for back pain, joint swelling and myalgias. Will Klippel or w/c dependant    Skin:  Negative for rash and wound. Allergic/Immunologic: Positive for environmental allergies. Neurological:  Positive for numbness. Negative for dizziness, weakness and headaches. Bilateral feet    Hematological:  Bruises/bleeds easily. Psychiatric/Behavioral:  Negative for agitation and confusion. The patient is not nervous/anxious. See HPI    GENERAL PHYSICAL EXAM:     Vitals: See nurse flow sheet     Physical Exam  Constitutional:       General: She is not in acute distress. Appearance: Normal appearance. She is well-developed. She is obese. She is not ill-appearing or toxic-appearing. HENT:      Head: Normocephalic and atraumatic. Mouth/Throat:      Mouth: Mucous membranes are dry. Pharynx: Oropharynx is clear. No oropharyngeal exudate or posterior oropharyngeal erythema. Comments: Partial dentures.    Eyes: Extraocular Movements: Extraocular movements intact. Conjunctiva/sclera: Conjunctivae normal.      Pupils: Pupils are equal, round, and reactive to light. Cardiovascular:      Rate and Rhythm: Normal rate and regular rhythm. Pulses: Normal pulses. Heart sounds: Normal heart sounds. No murmur heard. No friction rub. No gallop. Pulmonary:      Effort: Pulmonary effort is normal.      Breath sounds: Normal breath sounds. No wheezing. Abdominal:      General: Bowel sounds are normal. There is no distension. Palpations: Abdomen is soft. Tenderness: There is abdominal tenderness. There is guarding. There is no rebound. Comments: Hyperactive bs. Tenderness to RUQ and RLQ, LUQ   Musculoskeletal:         General: No swelling. Normal range of motion. Cervical back: Normal range of motion and neck supple. No rigidity or tenderness. Right lower leg: Edema present. Left lower leg: Edema present. Comments: Kyphosis   BLE    Skin:     General: Skin is warm and dry. Findings: Bruising present. No erythema. Neurological:      General: No focal deficit present. Mental Status: She is alert and oriented to person, place, and time. Mental status is at baseline. Sensory: No sensory deficit. Motor: Weakness present. Gait: Gait abnormal.      Comments: In W/c    Psychiatric:         Mood and Affect: Mood normal.         Behavior: Behavior normal.         Thought Content:  Thought content normal.         Judgment: Judgment normal.                                                                                       PROVISIONAL DIAGNOSES / SURGERY:      EGD BIOPSY     ABNORMAL CT  BLEEDING      Patient Active Problem List    Diagnosis Date Noted    ESBL (extended spectrum beta-lactamase) producing bacteria infection 20/79/8122    Complicated UTI (urinary tract infection) 08/01/2022    Intractable abdominal pain 05/08/2022    Ileus ([address] 12/18/2021    Class 1 obesity in adult 12/18/2021    Generalized abdominal pain 12/18/2021    Multifocal pneumonia 12/18/2021    Other constipation     Lower abdominal pain     Septicemia ([address] 09/09/2021    Coffee ground emesis 02/07/2021    Acute cystitis without hematuria 02/07/2021    Ulcerative esophagitis 02/07/2021    Expressive aphasia 12/14/2020    Transient speech disturbance     Speech disturbance     Esophagitis determined by endoscopy     Phlebitis after infusion     Chronic pain 06/19/2020    Dyspnea on exertion 06/19/2020    Difficulty walking 06/19/2020    Fever 06/02/2020    Essential hypertension 06/02/2020    Anemia due to blood loss 06/02/2020    Orthostatic hypotension 06/02/2020    Stage 3 chronic kidney disease ([address] 06/02/2020    Gastric outlet obstruction 05/12/2020    Tobacco abuse 05/12/2020    S/P percutaneous endoscopic gastrostomy (PEG) tube placement ([address] 05/12/2020    S/P Nissen fundoplication (without gastrostomy tube) procedure 05/12/2020    Recurrent UTI     Tracheostomy in place [facility])     H/O gastric ulcer     Hyperlipidemia     Chronic respiratory failure with hypoxia ([address] 04/06/2020    Critical illness polyneuropathy ([address]     Shock ([address]     Centrilobular emphysema ([address]     Esophageal dilatation     History of repair of hiatal hernia 02/27/2020    Hiatal hernia 02/24/2020    Dysphagia 02/04/2020    Backache 09/11/2019    GERD (gastroesophageal reflux disease) 09/11/2019    MVP (mitral valve prolapse) 09/11/2019    Depression 09/11/2019    Anxiety 09/11/2019    BENEDICT (acute kidney injury) ([address] 05/01/2018    Frequency of urination 10/04/2017               MARGOTH Berrios - CNP on 9/8/2022 at 9:31 AM

## 2022-09-08 NOTE — ANESTHESIA POSTPROCEDURE EVALUATION
POST- ANESTHESIA EVALUATION       Pt Name: Tigist Jane  MRN: [id number]  [place]: 1945  Date of evaluation: 9/8/2022  Time:  12:57 PM      /86   Pulse 69   Temp 97.3 °F (36.3 °C) (Infrared)   Resp 20   Ht 5' 2\" (1.575 m)   Wt 176 lb (79.8 kg)   SpO2 96%   BMI 32.19 kg/m²      Consciousness Level  Awake  Cardiopulmonary Status  Stable  Pain Adequately Treated YES  Nausea / Vomiting  NO  Adequate Hydration  YES  Anesthesia Related Complications NONE      Electronically signed by Bailey Vazquez MD on 9/8/2022 at 12:57 PM       Department of Anesthesiology  Postprocedure Note    Patient: Tigist Jane  MRN: [id number]  [place]: 1945  Date of evaluation: 9/8/2022      Procedure Summary     Date: 09/08/22 [facility]    Anesthesia Start: 1127 Anesthesia Stop: 1152    Procedure: EGD BIOPSY (Esophagus) Diagnosis:       Abnormal CT scan      Bleeding          Surgeons: Janis Rosa MD Responsible Provider: Bailey Vazquez MD    Anesthesia Type: general ASA Status: 3          Anesthesia Type: No value filed.     Alhaji Phase I:      Alhaji Phase II: Alhaji Score: 9      Anesthesia Post Evaluation

## 2022-09-08 NOTE — DISCHARGE INSTRUCTIONS
EGD DISCHARGE INSTRUCTIONS    Activity:  Rest today. No driving, operating machinery, or making any important decisions today. May resume normal activity tomorrow. Diet:  Following EGD eat slowly, chew food well, cut food into small pieces-Avoid greasy, spicy, \"crunchy\" foods X 48 hours. Call your Doctor if you have any of the following:  -Passing blood rectally or vomiting blood (it may be red or black). -Persistent nausea/vomiting  -Severe abdominal or chest pain not relieved with passing gas  -Fever of 100 degrees or more  -Redness or swelling at the IV site  -Severe sore throat or neck pain .

## 2022-09-08 NOTE — ANESTHESIA PRE PROCEDURE
Department of Anesthesiology  Preprocedure Note       Name:  Luigi Ware   Age:  68 y.o.  :  1945                                          MRN:  [id number]         Date:  2022      Surgeon: Gypsy Magaña):  Fide Arriaza MD    Procedure: Procedure(s):  EGD BIOPSY    Medications prior to admission:   Prior to Admission medications    Medication Sig Start Date End Date Taking? Authorizing Provider   oxyCODONE-acetaminophen (PERCOCET) 5-325 MG per tablet Take 1 tablet by mouth every 8 hours as needed for Pain. Historical Provider, MD   carboxymethylcellulose 1 % ophthalmic solution Place 1 drop into both eyes in the morning and 1 drop at noon and 1 drop in the evening and 1 drop before bedtime. Historical Provider, MD   Dyphylline-guaiFENesin 100-100 MG/5ML LIQD Take 1 tablet by mouth in the morning and at bedtime    Historical Provider, MD   magnesium hydroxide (MILK OF MAGNESIA) 400 MG/5ML suspension Take 30 mLs by mouth Give 30ml by mouth as needed for constipation no BM x3 days    Historical Provider, MD   acetaminophen (TYLENOL) 500 MG tablet Take 1,000 mg by mouth in the morning and at bedtime    Historical Provider, MD   clotrimazole-betamethasone (LOTRISONE) 1-0.05 % cream Apply topically 2 times daily Apply topically 2 times daily. 5/10/22   Arpan Cool DO   Cholecalciferol (VITAMIN D) 50 MCG ( UT) CAPS capsule Take 1 capsule by mouth daily 5/10/22   Esther Goetz DO   guaiFENesin (MUCINEX) 600 MG extended release tablet Take 400 mg by mouth in the morning. Indications: Cough. As needed for mucus.     Historical Provider, MD   Lactobacillus TABS Take 1 tablet by mouth 2 times daily Take One tablet by mouth two times daily    Historical Provider, MD   fluticasone (FLONASE) 50 MCG/ACT nasal spray 1 spray by Each Nostril route daily    Historical Provider, MD   loratadine (CLARITIN) 10 MG capsule Take 10 mg by mouth daily    Historical Provider, MD   clonazePAM (KLONOPIN) 0.5 MG tablet Take 0.5 mg by mouth 3 times daily as needed (tid). Historical Provider, MD   bisacodyl (DULCOLAX) 5 MG EC tablet TAKE 2 TABS AS INSTRUCTED BY THE OFFICE THE DAY PRIOR TO COLONOSCOPY 1/5/22   Antoine Houser MD   metoclopramide (REGLAN) 5 MG tablet Take 1 tablet by mouth 4 times daily 1/4/22   Antoine Houser MD   polyethylene glycol (GLYCOLAX) 17 g packet Take 17 g by mouth daily as needed for Constipation 12/19/21   Xavier Mcgee MD   QUEtiapine (SEROQUEL) 100 MG tablet Take 1 tablet by mouth nightly for 3 days 12/19/21 12/22/21  Xavier Mcgee MD   QUEtiapine (SEROQUEL) 25 MG tablet Take 1 tablet by mouth every morning (before breakfast) for 3 days  Patient taking differently: Take 50 mg by mouth every morning (before breakfast) 12/19/21 12/22/21  Xavier Mcgee MD   gabapentin (NEURONTIN) 300 MG capsule Take 1 capsule by mouth 3 times daily for 3 days. 12/19/21 1/28/22  Xavier Mcgee MD   escitalopram (LEXAPRO) 10 MG tablet Take 20 mg by mouth in the morning. Historical Provider, MD   Folic Acid-Cholecalciferol 1-2000 MG-UNIT TABS Take by mouth    Historical Provider, MD   busPIRone (BUSPAR) 10 MG tablet Take 1 tablet by mouth 2 times daily 11/30/21   Morgan Renteria MD   pantoprazole (PROTONIX) 40 MG tablet Take 1 tablet by mouth 2 times daily 11/25/21 1/28/22  Xavier Mcgee MD   bisacodyl (DULCOLAX) 10 MG suppository Place 10 mg rectally daily as needed for Constipation PRN for constipation no BM x4 days.     Historical Provider, MD   Sodium Phosphates (FLEET) 7-19 GM/118ML Place 1 enema rectally As needed after no BM for 5 days    Historical Provider, MD   budesonide-formoterol (SYMBICORT) 160-4.5 MCG/ACT AERO Inhale 2 puffs into the lungs 2 times daily    Historical Provider, MD   ferrous sulfate (FE TABS 325) 325 (65 Fe) MG EC tablet Take 1 tablet by mouth daily (with breakfast) 12/16/20   MARGOTH Lee - NP   amitriptyline (ELAVIL) 25 MG tablet Take 1 tablet by mouth nightly  Patient taking differently: Take 25 mg by mouth in the morning, at noon, and at bedtime 7/14/20   Patrizia Orantes MD   metoprolol succinate (TOPROL XL) 25 MG extended release tablet Take 1 tablet by mouth daily 5/30/20   Sudarshan Miranda MD   sucralfate (CARAFATE) 1 GM tablet Take 1 tablet by mouth 4 times daily 4/6/20   Allison Lao MD   Oyster Shell 500 MG TABS Take 500 mg by mouth 2 times daily     Historical Provider, MD   simvastatin (ZOCOR) 40 MG tablet Take 40 mg by mouth nightly. Historical Provider, MD       Current medications:    Current Facility-Administered Medications   Medication Dose Route Frequency Provider Last Rate Last Admin    lidocaine PF 1 % injection 1 mL  1 mL IntraDERmal Once PRN Jose M Moore MD        lactated ringers infusion   IntraVENous Continuous Jose M Moore MD        sodium chloride flush 0.9 % injection 5-40 mL  5-40 mL IntraVENous 2 times per day Jose M Moore MD        sodium chloride flush 0.9 % injection 5-40 mL  5-40 mL IntraVENous PRN Jose M Moore MD        0.9 % sodium chloride infusion   IntraVENous PRN Jose M Moore MD           Allergies: Allergies   Allergen Reactions    Prednisone Anxiety    Compazine [Prochlorperazine Maleate]     Penicillin V Potassium     Penicillins Other (See Comments)     Shock- received meropenem and ceftriaxone wo issues 2020       Problem List:    Patient Active Problem List   Diagnosis Code    Frequency of urination R35.0    Backache M54.9    GERD (gastroesophageal reflux disease) K21.9    MVP (mitral valve prolapse) I34.1    Depression F32. A    Anxiety F41.9    BENEDICT (acute kidney injury) ([facility] [address] N17.9    Dysphagia R13.10    Hiatal hernia K44.9    History of repair of hiatal hernia Z98.890, Z87.19    Centrilobular emphysema (HCC) J43.2    Esophageal dilatation K22.89    Shock (HCC) R57.9    Fever R50.9    Critical illness polyneuropathy (HCC) G62.81    Gastric outlet obstruction K31.1    Recurrent UTI N39.0    Tracheostomy in place ([facility] [address] Z93.0    H/O gastric ulcer Z87.11    Hyperlipidemia E78.5    Essential hypertension I10    Tobacco abuse Z72.0    Chronic respiratory failure with hypoxia ([facility]) J96.11    S/P percutaneous endoscopic gastrostomy (PEG) tube placement ([facility]) Z93.1    S/P Nissen fundoplication (without gastrostomy tube) procedure Z98.890    Anemia due to blood loss D50.0    Phlebitis after infusion T80. 1XXA, I80.9    Esophagitis determined by endoscopy K20.90    Expressive aphasia R47.01    Transient speech disturbance R47.9    Speech disturbance R47.9    Coffee ground emesis K92.0    Acute cystitis without hematuria N30.00    Ulcerative esophagitis K22.10    Septicemia ([facility]) A41.9    Lower abdominal pain R10.30    Ileus ([facility]) K56.7    Chronic pain G89.29    Dyspnea on exertion R06.00    Orthostatic hypotension I95.1    Stage 3 chronic kidney disease ([facility]) N18.30    Difficulty walking R26.2    Class 1 obesity in adult E66.9    Generalized abdominal pain R10.84    Multifocal pneumonia J18.9    Other constipation K59.09    Intractable abdominal pain R10.9    ESBL (extended spectrum beta-lactamase) producing bacteria infection A49.9, R48.91    Complicated UTI (urinary tract infection) N39.0       Past Medical History:        Diagnosis Date    A-fib ([facility])     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 Ras Saucedo in May 2019    Recurrent UTI     Dr. Rob Gordillo S/P PICC central line placement 08/05/2022    \"no longer present\" per Dharmesh Armando (Nurse) at [address] [facility].  Sepsis ([address]     Sinusitis     Tracheostomy in place [facility])     \"No longer present\" per Justin Arauz (Nurse) at [facility] [place].     Ulcerative esophagitis     Wellness examination     Dr. Tete Granados seen in Jan 2020       Past Surgical History:        Procedure Laterality Date    APPENDECTOMY      CARPAL TUNNEL RELEASE      CHOLECYSTECTOMY, LAPAROSCOPIC N/A 05/22/2020    XI LAPAROSCOPIC ROBOTIC CHOLECYSTECTOMY, PYLOROPLASTY performed by Tricia Sood MD at [address] COLONOSCOPY N/A 1/28/2022    COLONOSCOPY POLYPECTOMY HOT performed by Danni Chong MD at [address] ERCP  05/21/2020    with stent insertion, balloon dilation, sphinctereotomy    ERCP  05/21/2020    ** CASE IN OR WITY GI STAFF** ERCP STENT INSERTION performed by Danni Chong MD at [facility]    ERCP  05/21/2020    ** CASE IN OR WITH GI STAFF**ERCP SPHINCTER/PAPILLOTOMY performed by Danni Chong MD at [facility]    ERCP  05/21/2020    ** CASE IN OR WITH GI STAFF**ERCP DILATION BALLOON performed by Danni Chong MD at [facility]    ERCP N/A 2/8/2021    ERCP STENT REMOVAL performed by Alexys Preciado MD at [facility]    ERCP  2/8/2021    ERCP STONE REMOVAL performed by Alexys Preciado MD at [address]    GASTRIC FUNDOPLICATION N/A 73/09/0682    XI LAPAROSCOPIC ROBOTIC HIATAL HERNIA REPAIR, CHERYL FUNDOPLICATION performed by Tricia Sood MD at [facility] N/A 03/27/2020    EGD PEG TUBE PLACEMENT performed by Tricia Sood MD at [facility] N/A 2/8/2021    **CASE IN O.R. W/ GI STAFF - EGD WITH REMOVAL PEG TUBE performed by Alexys Preciado MD at [facility]    HAND SURGERY      [facility] CATH POWER PICC TRIPLE  03/04/2020         HERNIA REPAIR      Hiatal hernia    HYSTERECTOMY (CERVIX STATUS UNKNOWN)      Abdominal    IR NONTUNNELED VASCULAR CATHETER  5/10/2022 IR NONTUNNELED VASCULAR CATHETER 5/10/2022 Vega Posada MD [facility]    JOINT REPLACEMENT Right     right hip    OVARY REMOVAL      RHINOPLASTY      TONSILLECTOMY      TOTAL HIP ARTHROPLASTY      TOTAL NEPHRECTOMY      atrophic from infections, age 14    TRACHEOSTOMY N/A 03/27/2020    **ADD ON **WANTS 10:00AM **TRACHEOTOMY performed by Libertad Velasquez MD at [address] 04/02/2020    TRACHEOTOMY EXCHANGE performed by Libertad Velasquez MD at [facility] N/A 03/17/2020    BEDSIDE EGD ESOPHAGOGASTRODUODENOSCOPY (ICU) performed by Libertad Velasquez MD at [place] [address] N/A 05/18/2020    EGD BIOPSY performed by Lizbeth Carrillo MD at [place] [address]  05/18/2020    EGD DILATION BALLOON performed by Lizbeth Carrillo MD at [place] [address] N/A 07/06/2020    ** CASE IN O.R. WITH GI STAFF** EGD ESOPHAGOGASTRODUODENOSCOPY performed by Kiel Parsons MD at [place] [address] 11/09/2020    EGD DIAGNOSTIC ONLY performed by Maryan Ledesma MD at [place] [address]  02/08/2021    - EGD WITH REMOVAL PEG TUBE,ERCP STENT REMOVAL,ERCP STONE REMOVAL    UPPER GASTROINTESTINAL ENDOSCOPY N/A 11/24/2021    EGD BIOPSY performed by Maryan Ledesma MD at [facility]       Social History:    Social History     Tobacco Use    Smoking status: Former     Packs/day: 1.00     Years: 50.00     Pack years: 50.00     Types: Cigarettes    Smokeless tobacco: Never    Tobacco comments:     quit 1 year ago    Substance Use Topics    Alcohol use: No                                Counseling given: Not Answered  Tobacco comments: quit 1 year ago       Vital Signs (Current): There were no vitals filed for this visit.                                            BP Readings from Last 3 Encounters:   08/17/22 (!) 155/114   08/05/22 128/81   06/01/22 (!) 100/55       NPO Status:                                                                                 BMI:   Wt Readings from Last 3 Encounters:   09/01/22 176 lb 11.2 oz (80.2 kg)   07/31/22 179 lb (81.2 kg)   05/06/22 186 lb 8 oz (84.6 kg)     There is no height or weight on file to calculate BMI.    CBC:   Lab Results   Component Value Date/Time    WBC 6.1 08/17/2022 06:17 PM    RBC 4.77 08/17/2022 06:17 PM    HGB 13.9 08/17/2022 06:17 PM    HCT 42.6 08/17/2022 06:17 PM    MCV 89.3 08/17/2022 06:17 PM    RDW 14.6 08/17/2022 06:17 PM     08/17/2022 06:17 PM       CMP:   Lab Results   Component Value Date/Time     08/17/2022 06:17 PM    K 3.9 08/17/2022 06:17 PM     08/17/2022 06:17 PM    CO2 24 08/17/2022 06:17 PM    BUN 10 08/17/2022 06:17 PM    CREATININE 0.91 08/17/2022 06:17 PM    GFRAA >60 08/17/2022 06:17 PM    LABGLOM >60 08/17/2022 06:17 PM    GLUCOSE 92 08/17/2022 06:17 PM    PROT 7.0 08/17/2022 06:17 PM    CALCIUM 9.8 08/17/2022 06:17 PM    BILITOT 0.42 08/17/2022 06:17 PM    ALKPHOS 116 08/17/2022 06:17 PM    AST 31 08/17/2022 06:17 PM    ALT 31 08/17/2022 06:17 PM       POC Tests: No results for input(s): POCGLU, POCNA, POCK, POCCL, POCBUN, POCHEMO, POCHCT in the last 72 hours.     Coags:   Lab Results   Component Value Date/Time    PROTIME 10.9 05/06/2022 10:28 AM    INR 1.0 05/06/2022 10:28 AM    APTT 27.2 11/23/2021 08:43 PM       HCG (If Applicable): No results found for: PREGTESTUR, PREGSERUM, HCG, HCGQUANT     ABGs: No results found for: PHART, PO2ART, PUE3HPQ, NXM9PXB, BEART, U5PPYDFW     Type & Screen (If Applicable):  No results found for: LABABO, LABRH    Drug/Infectious Status (If Applicable):  Lab Results   Component Value Date/Time    HEPCAB NONREACTIVE 05/16/2020 01:01 PM       COVID-19 Screening (If Applicable):   Lab Results   Component Value Date/Time    COVID19 Not Detected 05/06/2022 03:10 PM    COVID19 Not Detected 02/07/2021 03:25 PM           Anesthesia Evaluation  Patient summary reviewed and Nursing notes reviewed no history of anesthetic complications:   Airway: Mallampati: III  TM distance: >3 FB   Neck ROM: full  Mouth opening: > = 3 FB   Dental:    (+) upper dentures and partials      Pulmonary: breath sounds clear to auscultation  (+) COPD:  shortness of breath: chronic,                             Cardiovascular:    (+) hypertension:, valvular problems/murmurs: MVP, dysrhythmias: atrial fibrillation, HOLL[place]:,       ECG reviewed  Rhythm: regular  Rate: normal  Echocardiogram reviewed               ROS comment: Left ventricle is normal in size. Global left ventricular systolic function  is normal. Calculated EF via heart model is 56 %. Mild left ventricular hypertrophy. Grade I (mild) left ventricular diastolic dysfunction     Neuro/Psych:   (+) neuromuscular disease:, psychiatric history: stable with treatment            GI/Hepatic/Renal:   (+) hiatal hernia, GERD:, PUD,           Endo/Other:                     Abdominal:             Vascular: negative vascular ROS. Other Findings:           Anesthesia Plan      general     ASA 3       Induction: intravenous. Anesthetic plan and risks discussed with patient. Plan discussed with CRNA.                     Elida aSn MD   9/8/2022

## 2022-09-09 LAB — SURGICAL PATHOLOGY REPORT: NORMAL

## 2022-09-12 DIAGNOSIS — R10.13 ABDOMINAL PAIN, EPIGASTRIC: Primary | ICD-10-CM

## 2022-09-12 NOTE — TELEPHONE ENCOUNTER
Pt called office again stating Trae Hill told him that pt needs a high dose medication. He is advised that office is aware. Pt med list currently reflects Protonix 40 mg BID and call will be made to facility to confirm pt is already taking this medication. Dr Tenisha Alvarez will also be updated to clarify treatment plan this afternoon.  (Patient's med list also reflects Carafate QID)

## 2022-09-12 NOTE — TELEPHONE ENCOUNTER
Writer made contact with [facility]. Pt is currently on Protonix BID and Carafate QID. They will also fax an updated med list to office.

## 2022-09-12 NOTE — TELEPHONE ENCOUNTER
Pt son called regarding medication adv pt son do not see any notes of any medications being sent in for pt, adv pt will send Dr Imani Villagomez nurse a note to f/u with  regarding pt abd pain, nurse Stacey Villegas at [facility] can fill prescription [phone], please advise.

## 2022-09-13 RX ORDER — ONDANSETRON 4 MG/1
4 TABLET, FILM COATED ORAL EVERY 8 HOURS PRN
COMMUNITY

## 2022-09-13 RX ORDER — TRAZODONE HYDROCHLORIDE 100 MG/1
100 TABLET ORAL NIGHTLY
COMMUNITY

## 2022-09-13 NOTE — TELEPHONE ENCOUNTER
Fasting gastrin orders are received from Dr Gerry Callahan. Med list was received from facility. Writer updated [facility]. Lab order is faxed to [phone] and [phone]. Fax confirmation is received.     Writer will also speak with Dr Gerry Callahan about updated med list

## 2022-09-14 NOTE — TELEPHONE ENCOUNTER
Med list was reviewed with Dr Lauren Tijerina. Abdominal pain and stomach inflammation could be caused from medications. We will wait on results from gastrin. He will also add flow cytometry to EGD. Writer Lm for Sonya Mcintyre to return call to office. Rubens Sahu

## 2022-09-21 NOTE — TELEPHONE ENCOUNTER
spoke with Tressa Michele. He is advised we have not received the results of lab yet.  then called facility and spoke with med records. She does not see it resulted. She then pages the nurse to see if she has it.  then spoke with Tamica PIZARRO. She does not have results yet and states she knows the lab was there to draw them last week. She will call the lab and see if they have results.

## 2022-09-26 NOTE — TELEPHONE ENCOUNTER
EGD scheduled for 10/11/2022 @ 11 Am at [facility].  PAT 10/05/2022 @ 3:15 pm.

## 2022-09-26 NOTE — TELEPHONE ENCOUNTER
Per Dr Tia Stewart, lab results normal.  Proceed with EGD with cytometry. Patient should also have virtual appt scheduled about 2-3 weeks after procedure. Writer called and spoke with Antwan Valle to update. Order is placed for procedure.

## 2022-10-05 ENCOUNTER — HOSPITAL ENCOUNTER (OUTPATIENT)
Dept: PREADMISSION TESTING | Age: 77
Discharge: HOME | End: 2022-10-09

## 2022-10-05 VITALS — BODY MASS INDEX: 32.3 KG/M2 | HEIGHT: 62 IN | WEIGHT: 175.5 LBS

## 2022-10-06 RX ORDER — SODIUM PHOSPHATE, DIBASIC AND SODIUM PHOSPHATE, MONOBASIC 7; 19 G/133ML; G/133ML
1 ENEMA RECTAL
COMMUNITY

## 2022-10-06 RX ORDER — MAGNESIUM HYDROXIDE/ALUMINUM HYDROXICE/SIMETHICONE 120; 1200; 1200 MG/30ML; MG/30ML; MG/30ML
5 SUSPENSION ORAL PRN
COMMUNITY

## 2022-10-10 ENCOUNTER — ANESTHESIA EVENT (OUTPATIENT)
Dept: ENDOSCOPY | Age: 77
End: 2022-10-10
Payer: MEDICARE

## 2022-10-10 NOTE — TELEPHONE ENCOUNTER
Per Javi Coello at [facility] the patient has been taking her oxycodone this whole time. Needs to r/s. Call and talk to Raúl when rescheduling.

## 2022-10-10 NOTE — TELEPHONE ENCOUNTER
Called Danny back and advised that the patient was okay to keep procedure. Reminded that she can only have any heart, blood pressure, or seizure medications tomorrow morning with a sip of water. Other meds after procedure complete. Writer thanked and call ended.

## 2022-10-11 ENCOUNTER — ANESTHESIA (OUTPATIENT)
Dept: ENDOSCOPY | Age: 77
End: 2022-10-11
Payer: MEDICARE

## 2022-10-11 ENCOUNTER — HOSPITAL ENCOUNTER (OUTPATIENT)
Age: 77
Setting detail: OUTPATIENT SURGERY
Discharge: HOME | End: 2022-10-11
Attending: INTERNAL MEDICINE | Admitting: INTERNAL MEDICINE
Payer: MEDICARE

## 2022-10-11 VITALS
OXYGEN SATURATION: 94 % | TEMPERATURE: 97.7 F | RESPIRATION RATE: 18 BRPM | DIASTOLIC BLOOD PRESSURE: 86 MMHG | WEIGHT: 175 LBS | BODY MASS INDEX: 32.2 KG/M2 | HEIGHT: 62 IN | HEART RATE: 70 BPM | SYSTOLIC BLOOD PRESSURE: 152 MMHG

## 2022-10-11 DIAGNOSIS — R13.10 DYSPHAGIA, UNSPECIFIED TYPE: ICD-10-CM

## 2022-10-11 PROCEDURE — 2580000003 HC RX 258: Performed by: ANESTHESIOLOGY

## 2022-10-11 PROCEDURE — 7100000010 HC PHASE II RECOVERY - FIRST 15 MIN: Performed by: INTERNAL MEDICINE

## 2022-10-11 PROCEDURE — 6360000002 HC RX W HCPCS: Performed by: ANESTHESIOLOGY

## 2022-10-11 PROCEDURE — 88305 TISSUE EXAM BY PATHOLOGIST: CPT

## 2022-10-11 PROCEDURE — 2709999900 HC NON-CHARGEABLE SUPPLY: Performed by: INTERNAL MEDICINE

## 2022-10-11 PROCEDURE — 2500000003 HC RX 250 WO HCPCS: Performed by: NURSE ANESTHETIST, CERTIFIED REGISTERED

## 2022-10-11 PROCEDURE — 6360000002 HC RX W HCPCS: Performed by: NURSE ANESTHETIST, CERTIFIED REGISTERED

## 2022-10-11 PROCEDURE — 3700000001 HC ADD 15 MINUTES (ANESTHESIA): Performed by: INTERNAL MEDICINE

## 2022-10-11 PROCEDURE — 3609012400 HC EGD TRANSORAL BIOPSY SINGLE/MULTIPLE: Performed by: INTERNAL MEDICINE

## 2022-10-11 PROCEDURE — 3700000000 HC ANESTHESIA ATTENDED CARE: Performed by: INTERNAL MEDICINE

## 2022-10-11 PROCEDURE — 7100000011 HC PHASE II RECOVERY - ADDTL 15 MIN: Performed by: INTERNAL MEDICINE

## 2022-10-11 PROCEDURE — 43239 EGD BIOPSY SINGLE/MULTIPLE: CPT | Performed by: INTERNAL MEDICINE

## 2022-10-11 RX ORDER — SODIUM CHLORIDE 9 MG/ML
INJECTION, SOLUTION INTRAVENOUS PRN
Status: DISCONTINUED | OUTPATIENT
Start: 2022-10-11 | End: 2022-10-11 | Stop reason: HOSPADM

## 2022-10-11 RX ORDER — SODIUM CHLORIDE 0.9 % (FLUSH) 0.9 %
5-40 SYRINGE (ML) INJECTION PRN
Status: DISCONTINUED | OUTPATIENT
Start: 2022-10-11 | End: 2022-10-11 | Stop reason: HOSPADM

## 2022-10-11 RX ORDER — PROPOFOL 10 MG/ML
INJECTION, EMULSION INTRAVENOUS PRN
Status: DISCONTINUED | OUTPATIENT
Start: 2022-10-11 | End: 2022-10-11 | Stop reason: SDUPTHER

## 2022-10-11 RX ORDER — LIDOCAINE HYDROCHLORIDE 10 MG/ML
1 INJECTION, SOLUTION EPIDURAL; INFILTRATION; INTRACAUDAL; PERINEURAL
Status: DISCONTINUED | OUTPATIENT
Start: 2022-10-11 | End: 2022-10-11 | Stop reason: HOSPADM

## 2022-10-11 RX ORDER — SODIUM CHLORIDE 0.9 % (FLUSH) 0.9 %
5-40 SYRINGE (ML) INJECTION EVERY 12 HOURS SCHEDULED
Status: DISCONTINUED | OUTPATIENT
Start: 2022-10-11 | End: 2022-10-11 | Stop reason: HOSPADM

## 2022-10-11 RX ORDER — FENTANYL CITRATE 50 UG/ML
25 INJECTION, SOLUTION INTRAMUSCULAR; INTRAVENOUS EVERY 5 MIN PRN
Status: DISCONTINUED | OUTPATIENT
Start: 2022-10-11 | End: 2022-10-11 | Stop reason: HOSPADM

## 2022-10-11 RX ORDER — LIDOCAINE HYDROCHLORIDE 20 MG/ML
INJECTION, SOLUTION EPIDURAL; INFILTRATION; INTRACAUDAL; PERINEURAL PRN
Status: DISCONTINUED | OUTPATIENT
Start: 2022-10-11 | End: 2022-10-11 | Stop reason: SDUPTHER

## 2022-10-11 RX ORDER — ONDANSETRON 2 MG/ML
4 INJECTION INTRAMUSCULAR; INTRAVENOUS
Status: COMPLETED | OUTPATIENT
Start: 2022-10-11 | End: 2022-10-11

## 2022-10-11 RX ORDER — DIPHENHYDRAMINE HYDROCHLORIDE 50 MG/ML
12.5 INJECTION INTRAMUSCULAR; INTRAVENOUS
Status: DISCONTINUED | OUTPATIENT
Start: 2022-10-11 | End: 2022-10-11 | Stop reason: HOSPADM

## 2022-10-11 RX ORDER — ONDANSETRON 2 MG/ML
4 INJECTION INTRAMUSCULAR; INTRAVENOUS ONCE
Status: COMPLETED | OUTPATIENT
Start: 2022-10-11 | End: 2022-10-11

## 2022-10-11 RX ORDER — SODIUM CHLORIDE 9 MG/ML
INJECTION, SOLUTION INTRAVENOUS CONTINUOUS
Status: DISCONTINUED | OUTPATIENT
Start: 2022-10-11 | End: 2022-10-11 | Stop reason: HOSPADM

## 2022-10-11 RX ADMIN — LIDOCAINE HYDROCHLORIDE 80 MG: 20 INJECTION, SOLUTION EPIDURAL; INFILTRATION; INTRACAUDAL; PERINEURAL at 11:29

## 2022-10-11 RX ADMIN — ONDANSETRON 4 MG: 2 INJECTION INTRAMUSCULAR; INTRAVENOUS at 12:20

## 2022-10-11 RX ADMIN — PROPOFOL 100 MG: 10 INJECTION, EMULSION INTRAVENOUS at 11:29

## 2022-10-11 RX ADMIN — ONDANSETRON 4 MG: 2 INJECTION INTRAMUSCULAR; INTRAVENOUS at 10:11

## 2022-10-11 RX ADMIN — SODIUM CHLORIDE: 9 INJECTION, SOLUTION INTRAVENOUS at 10:00

## 2022-10-11 ASSESSMENT — ENCOUNTER SYMPTOMS
COUGH: 1
TROUBLE SWALLOWING: 0
SORE THROAT: 0
APNEA: 0
WHEEZING: 0
SHORTNESS OF BREATH: 1
RHINORRHEA: 0
SINUS PRESSURE: 0
CHEST TIGHTNESS: 0
SHORTNESS OF BREATH: 0
BACK PAIN: 0
SINUS PAIN: 0

## 2022-10-11 ASSESSMENT — PAIN - FUNCTIONAL ASSESSMENT: PAIN_FUNCTIONAL_ASSESSMENT: 0-10

## 2022-10-11 ASSESSMENT — PAIN DESCRIPTION - DESCRIPTORS: DESCRIPTORS: ACHING

## 2022-10-11 NOTE — ANESTHESIA POSTPROCEDURE EVALUATION
POST- ANESTHESIA EVALUATION       Pt Name: Juan Stokes  MRN: [id number]  YOB: 1945  Date of evaluation: 10/11/2022  Time:  1:21 PM      BP (!) 152/86   Pulse 70   Temp 97.7 °F (36.5 °C)   Resp 18   Ht 5' 2\" (1.575 m)   Wt 175 lb (79.4 kg)   SpO2 94%   BMI 32.01 kg/m²      Consciousness Level  Awake  Cardiopulmonary Status  Stable  Pain Adequately Treated YES  Nausea / Vomiting  NO  Adequate Hydration  YES  Anesthesia Related Complications NONE      Electronically signed by Lluvia Quintero MD on 10/11/2022 at 1:21 PM       Department of Anesthesiology  Postprocedure Note    Patient: Juan Stokes  MRN: [id number]  [place]: 1945  Date of evaluation: 10/11/2022      Procedure Summary     Date: 10/11/22 Room / Location: [place] [address] / [facility]    Anesthesia Start: 1121 Anesthesia Stop: 9893    Procedure: EGD BIOPSY (Esophagus) Diagnosis:       Dysphagia, unspecified type      (DYSPHAGIA)    Surgeons: Dc Fuller MD Responsible Provider: Lluvia Quintero MD    Anesthesia Type: general ASA Status: 3          Anesthesia Type: No value filed.     Alhaji Phase I:      Alhaji Phase II: Alhaji Score: 10      Anesthesia Post Evaluation

## 2022-10-11 NOTE — DISCHARGE INSTRUCTIONS
06/18/2013 Document Reviewed: 04/17/2013  [employer]® Patient Information ©2013 [employer]. EGD DISCHARGE INSTRUCTIONS    Activity:  Rest today. No driving, operating machinery, or making any important decisions today. May resume normal activity tomorrow. Diet:  Following EGD eat slowly, chew food well, cut food into small pieces-Avoid greasy, spicy, \"crunchy\" foods X 48 hours. Call your Doctor if you have any of the following:  -Passing blood rectally or vomiting blood (it may be red or black).   -Persistent nausea/vomiting  -Severe abdominal or chest pain not relieved with passing gas  -Fever of 100 degrees or more  -Redness or swelling at the IV site  -Severe sore throat or neck pain

## 2022-10-11 NOTE — ANESTHESIA PRE PROCEDURE
Department of Anesthesiology  Preprocedure Note       Name:  Agustín Oconnor   Age:  68 y.o.  :  1945                                          MRN:  [id number]         Date:  10/11/2022      Surgeon: Guru Brennan):  Shay Alfonso MD    Procedure: Procedure(s):  EGD BIOPSY    Medications prior to admission:   Prior to Admission medications    Medication Sig Start Date End Date Taking? Authorizing Provider   Sodium Phosphates (FLEET) 7-19 GM/118ML Place 1 enema rectally once as needed    Historical Provider, MD   aluminum & magnesium hydroxide-simethicone (MAALOX) 200-200-20 MG/5ML SUSP suspension Take 5 mLs by mouth as needed for Indigestion    Historical Provider, MD   traZODone (DESYREL) 100 MG tablet Take 100 mg by mouth nightly    Historical Provider, MD   ondansetron (ZOFRAN) 4 MG tablet Take 4 mg by mouth every 8 hours as needed for Nausea or Vomiting    Historical Provider, MD   albuterol (PROVENTIL) (2.5 MG/3ML) 0.083% nebulizer solution Take 2.5 mg by nebulization every 6 hours as needed for Wheezing    Historical Provider, MD   oxyCODONE-acetaminophen (PERCOCET) 5-325 MG per tablet Take 1 tablet by mouth every 8 hours as needed for Pain. Historical Provider, MD   carboxymethylcellulose 1 % ophthalmic solution Place 1 drop into both eyes in the morning and 1 drop at noon and 1 drop in the evening and 1 drop before bedtime. Historical Provider, MD   magnesium hydroxide (MILK OF MAGNESIA) 400 MG/5ML suspension Take 30 mLs by mouth Give 30ml by mouth as needed for constipation no BM x3 days    Historical Provider, MD   acetaminophen (TYLENOL) 500 MG tablet Take 1,000 mg by mouth in the morning and at bedtime    Historical Provider, MD   Cholecalciferol (VITAMIN D) 50 MCG (2000) CAPS capsule Take 1 capsule by mouth daily 5/10/22   Cleveland Goetz DO   guaiFENesin (MUCINEX) 600 MG extended release tablet Take 400 mg by mouth in the morning. Indications: Cough. As needed for mucus.     Historical Provider, MD   Lactobacillus TABS Take 1 tablet by mouth 2 times daily Take One tablet by mouth two times daily    Historical Provider, MD   fluticasone (FLONASE) 50 MCG/ACT nasal spray 1 spray by Each Nostril route daily    Historical Provider, MD   loratadine (CLARITIN) 10 MG capsule Take 10 mg by mouth daily    Historical Provider, MD   clonazePAM (KLONOPIN) 0.5 MG tablet Take 0.5 mg by mouth 3 times daily as needed (tid). Historical Provider, MD   metoclopramide (REGLAN) 5 MG tablet Take 1 tablet by mouth 4 times daily 1/4/22   Antoine Houser MD   polyethylene glycol (GLYCOLAX) 17 g packet Take 17 g by mouth daily as needed for Constipation 12/19/21   Nela Bustos MD   QUEtiapine (SEROQUEL) 100 MG tablet Take 1 tablet by mouth nightly for 3 days 12/19/21 9/8/22  Nela Bustos MD   QUEtiapine (SEROQUEL) 25 MG tablet Take 1 tablet by mouth every morning (before breakfast) for 3 days  Patient taking differently: Take 50 mg by mouth every morning (before breakfast) 12/19/21 9/8/22  Nela Bustos MD   escitalopram (LEXAPRO) 10 MG tablet Take 20 mg by mouth in the morning. Historical Provider, MD   Folic Acid-Cholecalciferol 1-2000 MG-UNIT TABS Take by mouth    Historical Provider, MD   busPIRone (BUSPAR) 10 MG tablet Take 1 tablet by mouth 2 times daily 11/30/21   Leobardo Gomez MD   pantoprazole (PROTONIX) 40 MG tablet Take 1 tablet by mouth 2 times daily 11/25/21 10/11/22  Nela Bustos MD   bisacodyl (DULCOLAX) 10 MG suppository Place 10 mg rectally daily as needed for Constipation PRN for constipation no BM x4 days.     Historical Provider, MD   budesonide-formoterol (SYMBICORT) 160-4.5 MCG/ACT AERO Inhale 2 puffs into the lungs 2 times daily    Historical Provider, MD   ferrous sulfate (FE TABS 325) 325 (65 Fe) MG EC tablet Take 1 tablet by mouth daily (with breakfast) 12/16/20   Burnett Osgood, APRN - NP   metoprolol succinate (TOPROL XL) 25 MG extended release tablet Take 1 tablet by mouth daily 5/30/20   Dominic Alan MD   sucralfate (CARAFATE) 1 GM tablet Take 1 tablet by mouth 4 times daily 4/6/20   Jordyn Espinosa MD   Oyster Shell 500 MG TABS Take 500 mg by mouth 2 times daily     Historical Provider, MD   simvastatin (ZOCOR) 40 MG tablet Take 40 mg by mouth nightly. Historical Provider, MD       Current medications:    Current Facility-Administered Medications   Medication Dose Route Frequency Provider Last Rate Last Admin    lidocaine PF 1 % injection 1 mL  1 mL IntraDERmal Once PRN Franky Moody MD        0.9 % sodium chloride infusion   IntraVENous Continuous Franky Moody MD        sodium chloride flush 0.9 % injection 5-40 mL  5-40 mL IntraVENous 2 times per day Franky Moody MD        sodium chloride flush 0.9 % injection 5-40 mL  5-40 mL IntraVENous PRN Franky Moody MD        0.9 % sodium chloride infusion   IntraVENous PRN Franky Moody MD           Allergies: Allergies   Allergen Reactions    Prednisone Anxiety    Compazine [Prochlorperazine Maleate]     Penicillin V Potassium     Penicillins Other (See Comments)     Shock- received meropenem and ceftriaxone wo issues 2020       Problem List:    Patient Active Problem List   Diagnosis Code    Frequency of urination R35.0    Backache M54.9    GERD (gastroesophageal reflux disease) K21.9    MVP (mitral valve prolapse) I34.1    Depression F32. A    Anxiety F41.9    BENEDICT (acute kidney injury) ([facility] N17.9    Dysphagia R13.10    Hiatal hernia K44.9    History of repair of hiatal hernia Z98.890, Z87.19    Centrilobular emphysema (HCC) J43.2    Esophageal dilatation K22.89    Shock (HCC) R57.9    Fever R50.9    Critical illness polyneuropathy ([facility]) G62.81    Gastric outlet obstruction K31.1    Recurrent UTI N39.0    Tracheostomy in place ([facility] Z93.0    H/O gastric ulcer Z87.11    Hyperlipidemia E78.5    Essential hypertension I10    Tobacco abuse Z72.0    Chronic respiratory failure with hypoxia ([facility]) J96.11    S/P percutaneous endoscopic gastrostomy (PEG) tube placement ([facility]) Z93.1    S/P Nissen fundoplication (without gastrostomy tube) procedure Z98.890    Anemia due to blood loss D50.0    Phlebitis after infusion T80. 1XXA, I80.9    Esophagitis determined by endoscopy K20.90    Expressive aphasia R47.01    Transient speech disturbance R47.9    Speech disturbance R47.9    Coffee ground emesis K92.0    Acute cystitis without hematuria N30.00    Ulcerative esophagitis K22.10    Septicemia ([facility]) A41.9    Lower abdominal pain R10.30    Ileus ([facility]) K56.7    Chronic pain G89.29    Dyspnea on exertion R06.09    Orthostatic hypotension I95.1    Stage 3 chronic kidney disease ([facility]) N18.30    Difficulty walking R26.2    Class 1 obesity in adult E66.9    Generalized abdominal pain R10.84    Multifocal pneumonia J18.9    Other constipation K59.09    Intractable abdominal pain R10.9    ESBL (extended spectrum beta-lactamase) producing bacteria infection A49.9, W50.80    Complicated UTI (urinary tract infection) N39.0       Past Medical History:        Diagnosis Date    A-fib ([facility]     Acquired absence of kidney     Adult hypertrophic pyloric stenosis     Agoraphobia with panic disorder     Agoraphobia without history of panic disorder     Allergic rhinitis     Anemia, unspecified     Anxiety     Anxiety disorder     Arthritis     Atrial fibrillation ([facility])     Back pain     Bronchitis     Caffeine use     8 coffee / day    Cardiomegaly     Carpal tunnel syndrome     Cataracts, bilateral     Centriacinar emphysema ([facility]     Chronic kidney disease, stage III (moderate) ([facility])     Chronic pain     Constipation     Constipation     COPD (chronic obstructive pulmonary disease) ([facility])     Emphysema    Cystitis with hematuria     Depression     Diaphragmatic hernia without obstruction or gangrene     Diarrhea     Difficulty walking     Dry eye syndrome of unspecified lacrimal gland     Esophageal obstruction     FOM (frequency of micturition)     Foot drop, right foot     Gastro-esophageal reflux disease with esophagitis, with bleeding     GERD (gastroesophageal reflux disease)     H/O gastric ulcer     HTN (hypertension)     Hyperlipidemia     Hypertension, essential     Klebsiella pneumoniae (k. pneumoniae) as the cause of diseases classified elsewhere     Major depression, recurrent ([address]     Mitral valve prolapse     Mixed hyperlipidemia     Mumps     Muscle weakness (generalized)     MVP (mitral valve prolapse)     Dr. Glen Santos in May 2019    Old myocardial infarction     Personal history of disease of digestive system     Psychophysiological insomnia     Recurrent UTI     Dr. Samara Barclay S/P PICC central line placement 08/05/2022    \"no longer present\" per Tariq (Nurse) at [facility] [place].  Sepsis ([address]     Sinusitis     Tracheostomy in place [facility])     \"No longer present\" per Tariq (Nurse) at [facility] [place].     Ulcerative esophagitis     Urinary tract infection, site not specified     Uses wheelchair     Wears glasses     Wellness examination     Dr. Aroldo Holman seen in Jan 2020       Past Surgical History:        Procedure Laterality Date    APPENDECTOMY      CARPAL TUNNEL RELEASE      CHOLECYSTECTOMY, LAPAROSCOPIC N/A 05/22/2020    XI LAPAROSCOPIC ROBOTIC CHOLECYSTECTOMY, PYLOROPLASTY performed by Jorge Johnson MD at [place] [address] COLONOSCOPY N/A 1/28/2022    COLONOSCOPY POLYPECTOMY HOT performed by Maggy Wells MD at  [address] ERCP  05/21/2020    with stent insertion, balloon dilation, sphinctereotomy    ERCP  05/21/2020    ** CASE IN OR WITY GI STAFF** ERCP STENT INSERTION performed by Maggy Wells MD at [facility]    ERCP  05/21/2020    ** CASE IN OR WITH GI STAFF**ERCP SPHINCTER/PAPILLOTOMY performed by Maggy Wells MD at [facility]    ERCP 05/21/2020    ** CASE IN OR WITH GI STAFF**ERCP DILATION BALLOON performed by Cleveland Dallas MD at [facility]    ERCP N/A 2/8/2021    ERCP STENT REMOVAL performed by Jason Gonzales MD at [facility]    ERCP  2/8/2021    ERCP STONE REMOVAL performed by Jason Gonzales MD at [address]      patricia IOL    GASTRIC FUNDOPLICATION N/A 43/67/9573    XI LAPAROSCOPIC ROBOTIC HIATAL HERNIA REPAIR, CHERYL FUNDOPLICATION performed by Gary Jones MD at [address] 03/27/2020    EGD PEG TUBE PLACEMENT performed by Gary Jones MD at [address] N/A 2/8/2021    **CASE IN O.R. W/ GI STAFF - EGD WITH REMOVAL PEG TUBE performed by Jason Gonzales MD at [facility]    HAND SURGERY      [facility] CATH POWER PICC TRIPLE  03/04/2020         HERNIA REPAIR      Hiatal hernia    HYSTERECTOMY (CERVIX STATUS UNKNOWN)      Abdominal    IR NONTUNNELED VASCULAR CATHETER  5/10/2022    IR NONTUNNELED VASCULAR CATHETER 5/10/2022 Wenda Habermann, MD [facility]    JOINT REPLACEMENT Right     right hip    OVARY REMOVAL      RHINOPLASTY      TONSILLECTOMY      TOTAL HIP ARTHROPLASTY      TOTAL NEPHRECTOMY      atrophic from infections, age 13   Hollace Pelt TRACHEOSTOMY N/A 03/27/2020    **ADD ON **WANTS 10:00AM **TRACHEOTOMY performed by Gary Jones MD at [address] 04/02/2020    TRACHEOTOMY EXCHANGE performed by Gary Jones MD at [address] [facility]  [address] N/A 03/17/2020    BEDSIDE EGD ESOPHAGOGASTRODUODENOSCOPY (ICU) performed by Gary Jones MD at [address] [place] [address] N/A 05/18/2020    EGD BIOPSY performed by Cleveland Dallas MD at [address] [place] [address]  05/18/2020    EGD DILATION BALLOON performed by Cleveland Dallas MD at [address] [place] [address] N/A 07/06/2020    ** CASE IN O.R. WITH GI STAFF** EGD ESOPHAGOGASTRODUODENOSCOPY performed by Fransisca Batista MD at [place] [address] N/A 11/09/2020    EGD DIAGNOSTIC ONLY performed by Bruno Mehta MD at [place] [address]  02/08/2021    - EGD WITH REMOVAL PEG TUBE,ERCP STENT REMOVAL,ERCP STONE REMOVAL    UPPER GASTROINTESTINAL ENDOSCOPY N/A 11/24/2021    EGD BIOPSY performed by Bruno Mehta MD at [place] [address] N/A 9/8/2022    EGD BIOPSY performed by Ernesto Villar MD at [address] [place] [address] History:    Social History     Tobacco Use    Smoking status: Former     Packs/day: 1.00     Years: 50.00     Pack years: 50.00     Types: Cigarettes    Smokeless tobacco: Never    Tobacco comments:     quit 1 year ago    Substance Use Topics    Alcohol use: No                                Counseling given: Not Answered  Tobacco comments: quit 1 year ago       Vital Signs (Current):   Vitals:    10/11/22 0934   BP: (!) 142/75   Pulse: 65   Resp: 16   Temp: 97.5 °F (36.4 °C)   TempSrc: Infrared   SpO2: 96%   Weight: 175 lb (79.4 kg)   Height: 5' 2\" (1.575 m)                                              BP Readings from Last 3 Encounters:   10/11/22 (!) 142/75   09/08/22 131/86   08/17/22 (!) 155/114       NPO Status: Time of last liquid consumption: 2000                        Time of last solid consumption: 1700                        Date of last liquid consumption: 10/10/22                        Date of last solid food consumption: 10/10/22    BMI:   Wt Readings from Last 3 Encounters:   10/11/22 175 lb (79.4 kg)   10/05/22 175 lb 8 oz (79.6 kg)   09/08/22 176 lb (79.8 kg)     Body mass index is 32.01 kg/m².     CBC:   Lab Results   Component Value Date/Time    WBC 6.1 08/17/2022 06:17 PM    RBC 4.77 08/17/2022 06:17 PM    HGB 13.9 08/17/2022 06:17 PM    HCT 42.6 08/17/2022 06:17 PM    MCV 89.3 08/17/2022 06:17 PM    RDW 14.6 08/17/2022 06:17 PM    PLT 202 08/17/2022 06:17 PM       CMP:   Lab Results   Component Value Date/Time     08/17/2022 06:17 PM    K 3.9 08/17/2022 06:17 PM     08/17/2022 06:17 PM    CO2 24 08/17/2022 06:17 PM    BUN 10 08/17/2022 06:17 PM    CREATININE 0.91 08/17/2022 06:17 PM    GFRAA >60 08/17/2022 06:17 PM    LABGLOM >60 08/17/2022 06:17 PM    GLUCOSE 92 08/17/2022 06:17 PM    PROT 7.0 08/17/2022 06:17 PM    CALCIUM 9.8 08/17/2022 06:17 PM    BILITOT 0.42 08/17/2022 06:17 PM    ALKPHOS 116 08/17/2022 06:17 PM    AST 31 08/17/2022 06:17 PM    ALT 31 08/17/2022 06:17 PM       POC Tests: No results for input(s): POCGLU, POCNA, POCK, POCCL, POCBUN, POCHEMO, POCHCT in the last 72 hours.     Coags:   Lab Results   Component Value Date/Time    PROTIME 10.9 05/06/2022 10:28 AM    INR 1.0 05/06/2022 10:28 AM    APTT 27.2 11/23/2021 08:43 PM       HCG (If Applicable): No results found for: PREGTESTUR, PREGSERUM, HCG, HCGQUANT     ABGs: No results found for: PHART, PO2ART, JTL0XVM, GPG8OLQ, BEART, D7XZZPXP     Type & Screen (If Applicable):  No results found for: LABABO, LABRH    Drug/Infectious Status (If Applicable):  Lab Results   Component Value Date/Time    HEPCAB NONREACTIVE 05/16/2020 01:01 PM       COVID-19 Screening (If Applicable):   Lab Results   Component Value Date/Time    COVID19 Not Detected 05/06/2022 03:10 PM    COVID19 Not Detected 02/07/2021 03:25 PM           Anesthesia Evaluation  Patient summary reviewed and Nursing notes reviewed no history of anesthetic complications:   Airway: Mallampati: III  TM distance: >3 FB   Neck ROM: full  Mouth opening: > = 3 FB   Dental:    (+) upper dentures and partials      Pulmonary: breath sounds clear to auscultation  (+) COPD:  shortness of breath:                             Cardiovascular:    (+) hypertension:, valvular problems/murmurs: MVP, past MI: > 6 months and no interval change, HOLLAND:,       ECG reviewed  Rhythm: regular  Rate: normal  Echocardiogram reviewed ROS comment: Left ventricle is normal in size. Global left ventricular systolic function is normal. Calculated EF via heart model is 56 %. Mild left ventricular hypertrophy. Neuro/Psych:   (+) neuromuscular disease:, psychiatric history:            GI/Hepatic/Renal:   (+) hiatal hernia, GERD:, PUD, bowel prep,           Endo/Other: Negative Endo/Other ROS                    Abdominal:             Vascular: Other Findings:           Anesthesia Plan      general     ASA 3       Induction: intravenous. MIPS: Prophylactic antiemetics administered. Anesthetic plan and risks discussed with patient. Plan discussed with CRNA.                     Leeroy Sauceda MD   10/11/2022

## 2022-10-11 NOTE — OP NOTE
PROCEDURE NOTE    DATE OF PROCEDURE: 10/11/2022     SURGEON: Magnolia Mtz MD  Facility: [facility]  ASSISTANT: None  Anesthesia: MAC  PREOPERATIVE DIAGNOSIS:   Abnormal looking stomach with very thickened mucosa and erythematous mucosa on biopsy there is no malignancy but it was very suspicious on endoscopy, the patient had persistent abdominal pain this is to follow-up after PPI treatment    Diagnosis:    The stomach still looking the same way with inflammation and thickening of the mucosa in the cardia and the body of the stomach biopsies were taken but separately  In the antrum severe inflammation with nodular mucosa especially around the pyloric orifice once again multiple biopsies were taken from that but in a separate jar      Esophagus looking a lot better at this time, the thickening and the edema is gone      POSTOPERATIVE DIAGNOSIS: As described below    OPERATION: Upper GI endoscopy with Biopsy    ANESTHESIA: Moderate Sedation     ESTIMATED BLOOD LOSS: Less than 50 ml    COMPLICATIONS: None. SPECIMENS:  Was Obtained:      As below     HISTORY: The patient is a 68y.o. year old female with history of above preop diagnosis. I recommended esophagogastroduodenoscopy with possible biopsy and I explained the risk, benefits, expected outcome, and alternatives to the procedure. Risks included but are not limited to bleeding, infection, respiratory distress, hypotension, and perforation of the esophagus, stomach, or duodenum. Patient understands and is in agreement. The patient was counseled at length about the risks of jhoan Covid-19 during their perioperative period and any recovery window from their procedure. The patient was made aware that jhoan Covid-19  may worsen their prognosis for recovering from their procedure  and lend to a higher morbidity and/or mortality risk.   All material risks, benefits, and reasonable alternatives including postponing the procedure were discussed. The patient does wish to proceed with the procedure at this time. PROCEDURE: The patient was given IV conscious sedation. The patient's SPO2 remained above 90% throughout the procedure. The gastroscope was inserted orally and advanced under direct vision through the esophagus, through the stomach, through the pylorus, and into the descending duodenum. Post sedation note : The patient's SPO2 remained above 90% throughout the procedure. the vital signs remained stable , and no immediate complication form the procedure noted, patient will be ready for d/c when criteria is met . Findings:    Retropharyngeal area was grossly normal appearing    Esophagus: abnormal:   Esophagus looking a lot better at this time, the thickening and the edema is gone    Stomach: The stomach still looking the same way with inflammation and thickening of the mucosa in the cardia and the body of the stomach biopsies were taken but separately  In the antrum severe inflammation with nodular mucosa especially around the pyloric orifice once again multiple biopsies were taken from that but in a separate jar      Duodenum:     Descending: normal    Bulb: normal    The scope was removed and the patient tolerated the procedure well.      Recommendations/Plan:   F/U Biopsies  F/U In Office in 3-4 weeks  Discussed with the family    Electronically signed by Yuri Nathan MD  on 10/11/2022 at 11:41 AM

## 2022-10-11 NOTE — H&P
HISTORY and [address]       NAME:  Carol Ann Deleon  MRN: [id number]   YOB: 1945   Date: 10/11/2022   Age: 68 y.o. Gender: female       COMPLAINT AND PRESENT HISTORY:   Carol Ann Deleon  is a 68 y.o. female presenting today for EGD BIOPSY as r/t DYSPHAGIA  Pt states for \"a long time\" she has had symptoms of abdominal pain, N/V, alternating diarrhea and constipation. She denies any bloody or tarry stools. She denies any dysphagia. EGD post op note 9/8/22  Diagnosis:  Inflammation and thickening on the distal esophagus with the little bit narrowing from the wrap, biopsies were taken     Severe inflammation of the stomach with nodular mucosa throughout the entire colon hard to clean  Biopsies were taken from the upper stomach        The pyloric orifice is surrounded with erythematous hard nodules with mucosal thickening I went ahead and took multiple biopsies from that         Pt has a PMHX significant for AFIB, CKD-single kidney on left, COPD, HLD, HTN        NPO since midnight. Sip of water with meds. Pt does wear dentures. Pt denies any hx of MRSA infection  Pt not currently taking any blood thinners or anticoagulants  Pt denies any personal or FHx of complications with anesthesia. Pt denies any acute symptoms of illness at this time including no SOB, CP, fever, URI or UTI symptoms. RECENT IMAGING    No results found.      PAST MEDICAL HISTORY     Past Medical History:   Diagnosis Date    A-fib [facility])     Acquired absence of kidney     Adult hypertrophic pyloric stenosis     Agoraphobia with panic disorder     Agoraphobia without history of panic disorder     Allergic rhinitis     Anemia, unspecified     Anxiety     Anxiety disorder     Arthritis     Atrial fibrillation (HCC)     Back pain     Bronchitis     Caffeine use     8 coffee / day    Cardiomegaly     Carpal tunnel syndrome     Cataracts, bilateral     Centriacinar emphysema (HCC)     Chronic kidney disease, stage III (moderate) (HCC)     Chronic pain     Constipation     Constipation     COPD (chronic obstructive pulmonary disease) (HCC)     Emphysema    Cystitis with hematuria     Depression     Diaphragmatic hernia without obstruction or gangrene     Diarrhea     Difficulty walking     Dry eye syndrome of unspecified lacrimal gland     Esophageal obstruction     FOM (frequency of micturition)     Foot drop, right foot     Gastro-esophageal reflux disease with esophagitis, with bleeding     GERD (gastroesophageal reflux disease)     H/O gastric ulcer     HTN (hypertension)     Hyperlipidemia     Hypertension, essential     Klebsiella pneumoniae (k. pneumoniae) as the cause of diseases classified elsewhere     Major depression, recurrent (HCC)     Mitral valve prolapse     Mixed hyperlipidemia     Mumps     Muscle weakness (generalized)     MVP (mitral valve prolapse)     Dr. Cesia Silva in May 2019    Old myocardial infarction     Personal history of disease of digestive system     Psychophysiological insomnia     Recurrent UTI     Dr. Ledesma Gain    S/P PICC central line placement 08/05/2022    \"no longer present\" per Christy Maradiaga (Nurse) at [facility] of [place]. Sepsis ([address]     Sinusitis     Tracheostomy in place [facility])     \"No longer present\" per Christy Maradiaga (Nurse) at [facility] of [place].     Ulcerative esophagitis     Urinary tract infection, site not specified     Uses wheelchair     Wears glasses     Wellness examination     Dr. Bib Mcdonald seen in Jan 2020       SURGICAL HISTORY       Past Surgical History:   Procedure Laterality Date    [facility], LAPAROSCOPIC N/A 05/22/2020    XI LAPAROSCOPIC ROBOTIC CHOLECYSTECTOMY, PYLOROPLASTY performed by Estephania Noble MD at [address] [facility]      COLONOSCOPY N/A 1/28/2022    COLONOSCOPY POLYPECTOMY HOT performed by Rajni Jim MD at [address] [place] [address]      ERCP  05/21/2020    with stent insertion, balloon dilation, sphinctereotomy    ERCP  05/21/2020    ** CASE IN OR WITY GI STAFF** ERCP STENT INSERTION performed by Jett Charlton MD at [facility]    ERCP  05/21/2020    ** CASE IN OR WITH GI STAFF**ERCP SPHINCTER/PAPILLOTOMY performed by Jett Charlton MD at [facility]    ERCP  05/21/2020    ** CASE IN OR WITH GI STAFF**ERCP DILATION BALLOON performed by Jett Charlton MD at [facility]    ERCP N/A 2/8/2021    ERCP STENT REMOVAL performed by Gladys Rosenberg MD at [facility] [facility]    ERCP  2/8/2021    ERCP STONE REMOVAL performed by Gladys Rosenberg MD at [address] [facility]    GASTRIC FUNDOPLICATION N/A 79/78/6025    XI LAPAROSCOPIC ROBOTIC [facility], CHERYL FUNDOPLICATION performed by Edson Mendoza MD at [address] 03/27/2020    EGD PEG TUBE PLACEMENT performed by Edson Mendoza MD at [address] N/A 2/8/2021    **CASE IN O.R. W/ GI STAFF - EGD WITH REMOVAL PEG TUBE performed by Gladys Rosenberg MD at [facility] [facility]    HAND SURGERY      [facility]. CATH POWER PICC TRIPLE  03/04/2020         HERNIA REPAIR      Hiatal hernia    HYSTERECTOMY (CERVIX STATUS UNKNOWN)      Abdominal    IR NONTUNNELED VASCULAR CATHETER  5/10/2022    IR NONTUNNELED VASCULAR CATHETER 5/10/2022 Stephany Sands MD [facility]    JOINT REPLACEMENT Right     right hip    OVARY REMOVAL      RHINOPLASTY      TONSILLECTOMY      TOTAL HIP ARTHROPLASTY      TOTAL NEPHRECTOMY      atrophic from infections, age 13    TRACHEOSTOMY N/A 03/27/2020    **ADD ON **WANTS 10:00AM **TRACHEOTOMY performed by Edson Mendoza MD at [address] [facility] N/A 04/02/2020    TRACHEOTOMY EXCHANGE performed by Edson Mendoza MD at [facility] 03/17/2020    BEDSIDE EGD ESOPHAGOGASTRODUODENOSCOPY (ICU) performed by Edson Mendoza MD at [address] 05/18/2020    EGD BIOPSY performed by Kulwinder Rodriguez MD at [address] [facility]  05/18/2020    EGD DILATION BALLOON performed by Kulwinder Rodriguez MD at [facility] N/A 07/06/2020    ** CASE IN O.R. WITH GI STAFF** EGD ESOPHAGOGASTRODUODENOSCOPY performed by Cathy Fry MD at [address] [place] [address],[place] N/A 11/09/2020    EGD DIAGNOSTIC ONLY performed by Gaby Jordan MD at [address] [place] [address],[place]  02/08/2021    - EGD WITH REMOVAL PEG TUBE,ERCP STENT REMOVAL,ERCP STONE REMOVAL    UPPER GASTROINTESTINAL ENDOSCOPY N/A 11/24/2021    EGD BIOPSY performed by Gaby Jordan MD at [address] [place] [address],[facility] N/A 9/8/2022    EGD BIOPSY performed by Kulwinder Rodriguez MD at [address] [place] [address]       Family History   Problem Relation Age of Onset    Cancer Mother         uterine    Heart Attack Mother     Heart Attack Father     Other Maternal Grandfather         foot gangrene    Other Paternal Grandmother         bleeding ulcers    Other Paternal Grandfather         lung cancer       SOCIAL HISTORY       Social History     Socioeconomic History    Marital status:      Number of children: 2   Occupational History    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   Tobacco Use    Smoking status: Former     Packs/day: 1.00     Years: 50.00     Pack years: 50.00     Types: Cigarettes    Smokeless tobacco: Never    Tobacco comments:     quit 1 year ago    Vaping Use    Vaping Use: Former    Substances: Always   Substance and Sexual Activity    Alcohol use: No    Drug use: No    Sexual activity: Never           REVIEW OF SYSTEMS      Allergies   Allergen Reactions    Prednisone Anxiety    Compazine [Prochlorperazine Maleate]     Penicillin V Potassium     Penicillins Other (See Comments)     Shock- received meropenem and ceftriaxone wo issues 2020       No current facility-administered medications on file prior to encounter. Current Outpatient Medications on File Prior to Encounter   Medication Sig Dispense Refill    traZODone (DESYREL) 100 MG tablet Take 100 mg by mouth nightly      ondansetron (ZOFRAN) 4 MG tablet Take 4 mg by mouth every 8 hours as needed for Nausea or Vomiting      albuterol (PROVENTIL) (2.5 MG/3ML) 0.083% nebulizer solution Take 2.5 mg by nebulization every 6 hours as needed for Wheezing      oxyCODONE-acetaminophen (PERCOCET) 5-325 MG per tablet Take 1 tablet by mouth every 8 hours as needed for Pain. carboxymethylcellulose 1 % ophthalmic solution Place 1 drop into both eyes in the morning and 1 drop at noon and 1 drop in the evening and 1 drop before bedtime. magnesium hydroxide (MILK OF MAGNESIA) 400 MG/5ML suspension Take 30 mLs by mouth Give 30ml by mouth as needed for constipation no BM x3 days      acetaminophen (TYLENOL) 500 MG tablet Take 1,000 mg by mouth in the morning and at bedtime      Cholecalciferol (VITAMIN D) 50 MCG (2000 UT) CAPS capsule Take 1 capsule by mouth daily 30 capsule 0    guaiFENesin (MUCINEX) 600 MG extended release tablet Take 400 mg by mouth in the morning. Indications: Cough. As needed for mucus. Lactobacillus TABS Take 1 tablet by mouth 2 times daily Take One tablet by mouth two times daily      fluticasone (FLONASE) 50 MCG/ACT nasal spray 1 spray by Each Nostril route daily      loratadine (CLARITIN) 10 MG capsule Take 10 mg by mouth daily      clonazePAM (KLONOPIN) 0.5 MG tablet Take 0.5 mg by mouth 3 times daily as needed (tid).       metoclopramide (REGLAN) 5 MG tablet Take 1 tablet by mouth 4 times daily 120 tablet 3    polyethylene glycol (GLYCOLAX) 17 g packet Take 17 g by mouth daily as needed for Constipation 527 g 1    QUEtiapine (SEROQUEL) 100 MG tablet Take 1 tablet by mouth nightly for 3 days 3 tablet 0    QUEtiapine (SEROQUEL) 25 MG tablet Take 1 tablet by mouth every morning (before breakfast) for 3 days (Patient taking differently: Take 50 mg by mouth every morning (before breakfast)) 3 tablet 0    escitalopram (LEXAPRO) 10 MG tablet Take 20 mg by mouth in the morning. Folic Acid-Cholecalciferol 1-2000 MG-UNIT TABS Take by mouth      busPIRone (BUSPAR) 10 MG tablet Take 1 tablet by mouth 2 times daily  0    pantoprazole (PROTONIX) 40 MG tablet Take 1 tablet by mouth 2 times daily 60 tablet 0    bisacodyl (DULCOLAX) 10 MG suppository Place 10 mg rectally daily as needed for Constipation PRN for constipation no BM x4 days. budesonide-formoterol (SYMBICORT) 160-4.5 MCG/ACT AERO Inhale 2 puffs into the lungs 2 times daily      ferrous sulfate (FE TABS 325) 325 (65 Fe) MG EC tablet Take 1 tablet by mouth daily (with breakfast) 90 tablet 3    metoprolol succinate (TOPROL XL) 25 MG extended release tablet Take 1 tablet by mouth daily 30 tablet 3    sucralfate (CARAFATE) 1 GM tablet Take 1 tablet by mouth 4 times daily 120 tablet 3    Oyster Shell 500 MG TABS Take 500 mg by mouth 2 times daily       simvastatin (ZOCOR) 40 MG tablet Take 40 mg by mouth nightly. Review of Systems   Constitutional:  Negative for chills, diaphoresis, fatigue and fever. HENT:  Positive for dental problem. Negative for congestion, ear pain, postnasal drip, rhinorrhea, sinus pressure, sinus pain, sore throat and trouble swallowing. Eyes:  Positive for visual disturbance. Respiratory:  Positive for cough. Negative for apnea, chest tightness, shortness of breath and wheezing. Chronic    Cardiovascular:  Negative for chest pain, palpitations and leg swelling. Gastrointestinal:         SEE HPI    Genitourinary:  Negative for dysuria, flank pain, frequency and hematuria. Musculoskeletal:  Negative for back pain, joint swelling and myalgias. Skin:  Positive for rash. Left forearm chronic    Neurological:  Negative for dizziness, weakness, numbness and headaches. Hematological:  Does not bruise/bleed easily. Psychiatric/Behavioral:  Negative for agitation and confusion. The patient is not nervous/anxious. See HPI    GENERAL PHYSICAL EXAM:     Vitals: See nurse flow sheet     Physical Exam  Constitutional:       General: She is not in acute distress. Appearance: Normal appearance. She is well-developed. She is obese. She is not ill-appearing or toxic-appearing. HENT:      Head: Normocephalic and atraumatic. Mouth/Throat:      Mouth: Mucous membranes are dry. Pharynx: Oropharynx is clear. No oropharyngeal exudate or posterior oropharyngeal erythema. Comments: Upper dentures. Eyes:      Extraocular Movements: Extraocular movements intact. Conjunctiva/sclera: Conjunctivae normal.      Pupils: Pupils are equal, round, and reactive to light. Comments: Watery eyes    Cardiovascular:      Rate and Rhythm: Normal rate and regular rhythm. Pulses: Normal pulses. Heart sounds: Normal heart sounds. No murmur heard. No friction rub. No gallop. Pulmonary:      Effort: Pulmonary effort is normal.      Breath sounds: Normal breath sounds. No wheezing. Abdominal:      General: Bowel sounds are normal. There is no distension. Palpations: Abdomen is soft. Tenderness: There is no abdominal tenderness. There is no guarding or rebound. Comments: Hyperactive bs. Musculoskeletal:         General: No swelling. Normal range of motion. Cervical back: Normal range of motion and neck supple. No rigidity or tenderness. Right lower leg: No edema. Left lower leg: No edema. Skin:     General: Skin is warm and dry. Findings: Rash present. No erythema. Comments: Left forearm    Neurological:      General: No focal deficit present. Mental Status: She is alert and oriented to person, place, and time. Mental status is at baseline. Sensory: No sensory deficit. Motor: Weakness present.       Gait: Gait abnormal.      Comments: In w/c Psychiatric:         Mood and Affect: Mood normal.         Behavior: Behavior normal.         Thought Content:  Thought content normal.         Judgment: Judgment normal.                                                                                       PROVISIONAL DIAGNOSES / SURGERY:      EGD BIOPSY     DYSPHAGIA    Patient Active Problem List    Diagnosis Date Noted    ESBL (extended spectrum beta-lactamase) producing bacteria infection 56/81/6922    Complicated UTI (urinary tract infection) 08/01/2022    Intractable abdominal pain 05/08/2022    Ileus ([address] 12/18/2021    Class 1 obesity in adult 12/18/2021    Generalized abdominal pain 12/18/2021    Multifocal pneumonia 12/18/2021    Other constipation     Lower abdominal pain     Septicemia ([address] 09/09/2021    Coffee ground emesis 02/07/2021    Acute cystitis without hematuria 02/07/2021    Ulcerative esophagitis 02/07/2021    Expressive aphasia 12/14/2020    Transient speech disturbance     Speech disturbance     Esophagitis determined by endoscopy     Phlebitis after infusion     Chronic pain 06/19/2020    Dyspnea on exertion 06/19/2020    Difficulty walking 06/19/2020    Fever 06/02/2020    Essential hypertension 06/02/2020    Anemia due to blood loss 06/02/2020    Orthostatic hypotension 06/02/2020    Stage 3 chronic kidney disease ([address] 06/02/2020    Gastric outlet obstruction 05/12/2020    Tobacco abuse 05/12/2020    S/P percutaneous endoscopic gastrostomy (PEG) tube placement ([address] 05/12/2020    S/P Nissen fundoplication (without gastrostomy tube) procedure 05/12/2020    Recurrent UTI     Tracheostomy in place [facility])     H/O gastric ulcer     Hyperlipidemia     Chronic respiratory failure with hypoxia ([address] 04/06/2020    Critical illness polyneuropathy ([address]     Shock ([address]     Centrilobular emphysema ([address]     Esophageal dilatation     History of repair of hiatal hernia 02/27/2020    Hiatal hernia 02/24/2020    Dysphagia 02/04/2020    Backache 09/11/2019    GERD (gastroesophageal reflux disease) 09/11/2019    MVP (mitral valve prolapse) 09/11/2019    Depression 09/11/2019    Anxiety 09/11/2019    BENEDICT (acute kidney injury) ([facility] 05/01/2018    Frequency of urination 10/04/2017               MARGOTH Lucero - CNP on 10/11/2022 at 9:07 AM

## 2022-10-13 LAB — SURGICAL PATHOLOGY REPORT: NORMAL

## 2022-10-17 ENCOUNTER — TELEPHONE (OUTPATIENT)
Dept: GASTROENTEROLOGY | Age: 77
End: 2022-10-17

## 2022-10-17 DIAGNOSIS — R10.13 EPIGASTRIC PAIN: Primary | ICD-10-CM

## 2022-10-17 NOTE — TELEPHONE ENCOUNTER
Pts son madhu stepheniekelsey wanting to know next step after procedure and if an appointment will be needed.

## 2022-10-24 NOTE — TELEPHONE ENCOUNTER
Writer spoke with Dr Melchor Christine. He have order for stool H Pylori test.  Order is placed and faxed to facility. David Lyon is notified. Lab order is faxed to [phone] and [phone]. Fax confirmation is received.

## 2022-11-11 NOTE — TELEPHONE ENCOUNTER
Writer called and spoke with Sheela King at [facility] in Med records. She states she spoke with the unit manager. They sent the sample, but it was rejected. They will resend. Jeana Mercado is updated.

## 2023-01-01 ENCOUNTER — APPOINTMENT (OUTPATIENT)
Dept: GENERAL RADIOLOGY | Age: 78
End: 2023-01-01
Payer: MEDICARE

## 2023-01-01 ENCOUNTER — APPOINTMENT (OUTPATIENT)
Dept: CT IMAGING | Age: 78
End: 2023-01-01
Payer: MEDICARE

## 2023-01-01 ENCOUNTER — HOSPITAL ENCOUNTER (INPATIENT)
Age: 78
LOS: 5 days | Discharge: NURSING, REHAB OR LONG-TERM CARE | End: 2023-01-06
Attending: EMERGENCY MEDICINE | Admitting: INTERNAL MEDICINE
Payer: MEDICARE

## 2023-01-01 DIAGNOSIS — U07.1 COVID: Primary | ICD-10-CM

## 2023-01-01 DIAGNOSIS — G62.81 CRITICAL ILLNESS POLYNEUROPATHY (HCC): ICD-10-CM

## 2023-01-01 PROBLEM — J96.21 ACUTE ON CHRONIC RESPIRATORY FAILURE WITH HYPOXIA (HCC): Status: ACTIVE | Noted: 2023-01-01

## 2023-01-01 LAB
ABSOLUTE EOS #: 0.04 K/UL (ref 0–0.44)
ABSOLUTE IMMATURE GRANULOCYTE: 0.09 K/UL (ref 0–0.3)
ABSOLUTE LYMPH #: 0.71 K/UL (ref 1.1–3.7)
ABSOLUTE MONO #: 0.97 K/UL (ref 0.1–1.2)
ALBUMIN SERPL-MCNC: 3.7 G/DL (ref 3.5–5.2)
ALBUMIN/GLOBULIN RATIO: 1.1 (ref 1–2.5)
ALP BLD-CCNC: 115 U/L (ref 35–104)
ALT SERPL-CCNC: 21 U/L (ref 5–33)
ANION GAP SERPL CALCULATED.3IONS-SCNC: 13 MMOL/L (ref 9–17)
AST SERPL-CCNC: 22 U/L
BASOPHILS # BLD: 0 % (ref 0–2)
BASOPHILS ABSOLUTE: <0.03 K/UL (ref 0–0.2)
BILIRUB SERPL-MCNC: 0.3 MG/DL (ref 0.3–1.2)
BUN BLDV-MCNC: 21 MG/DL (ref 8–23)
CALCIUM SERPL-MCNC: 10 MG/DL (ref 8.6–10.4)
CHLORIDE BLD-SCNC: 103 MMOL/L (ref 98–107)
CO2: 21 MMOL/L (ref 20–31)
CREAT SERPL-MCNC: 1.09 MG/DL (ref 0.5–0.9)
D-DIMER QUANTITATIVE: 0.5 MG/L FEU
EOSINOPHILS RELATIVE PERCENT: 1 % (ref 1–4)
GFR SERPL CREATININE-BSD FRML MDRD: 52 ML/MIN/1.73M2
GLUCOSE BLD-MCNC: 104 MG/DL (ref 70–99)
HCT VFR BLD CALC: 46.8 % (ref 36.3–47.1)
HEMOGLOBIN: 14.9 G/DL (ref 11.9–15.1)
IMMATURE GRANULOCYTES: 1 %
LACTIC ACID, WHOLE BLOOD: 1.5 MMOL/L (ref 0.7–2.1)
LIPASE: 16 U/L (ref 13–60)
LYMPHOCYTES # BLD: 9 % (ref 24–43)
MAGNESIUM: 1.8 MG/DL (ref 1.6–2.6)
MCH RBC QN AUTO: 30.2 PG (ref 25.2–33.5)
MCHC RBC AUTO-ENTMCNC: 31.8 G/DL (ref 28.4–34.8)
MCV RBC AUTO: 94.9 FL (ref 82.6–102.9)
MONOCYTES # BLD: 13 % (ref 3–12)
NRBC AUTOMATED: 0 PER 100 WBC
PDW BLD-RTO: 14.6 % (ref 11.8–14.4)
PLATELET # BLD: 126 K/UL (ref 138–453)
PMV BLD AUTO: 11.1 FL (ref 8.1–13.5)
POTASSIUM SERPL-SCNC: 4.6 MMOL/L (ref 3.7–5.3)
RBC # BLD: 4.93 M/UL (ref 3.95–5.11)
RBC # BLD: ABNORMAL 10*6/UL
SARS-COV-2, RAPID: DETECTED
SEG NEUTROPHILS: 76 % (ref 36–65)
SEGMENTED NEUTROPHILS ABSOLUTE COUNT: 5.87 K/UL (ref 1.5–8.1)
SODIUM BLD-SCNC: 137 MMOL/L (ref 135–144)
SPECIMEN DESCRIPTION: ABNORMAL
TOTAL PROTEIN: 7 G/DL (ref 6.4–8.3)
WBC # BLD: 7.7 K/UL (ref 3.5–11.3)

## 2023-01-01 PROCEDURE — 86140 C-REACTIVE PROTEIN: CPT

## 2023-01-01 PROCEDURE — 2060000000 HC ICU INTERMEDIATE R&B

## 2023-01-01 PROCEDURE — 82728 ASSAY OF FERRITIN: CPT

## 2023-01-01 PROCEDURE — 87205 SMEAR GRAM STAIN: CPT

## 2023-01-01 PROCEDURE — 6360000002 HC RX W HCPCS: Performed by: STUDENT IN AN ORGANIZED HEALTH CARE EDUCATION/TRAINING PROGRAM

## 2023-01-01 PROCEDURE — 73030 X-RAY EXAM OF SHOULDER: CPT

## 2023-01-01 PROCEDURE — 99285 EMERGENCY DEPT VISIT HI MDM: CPT

## 2023-01-01 PROCEDURE — 6360000002 HC RX W HCPCS

## 2023-01-01 PROCEDURE — 96374 THER/PROPH/DIAG INJ IV PUSH: CPT

## 2023-01-01 PROCEDURE — 83735 ASSAY OF MAGNESIUM: CPT

## 2023-01-01 PROCEDURE — 74176 CT ABD & PELVIS W/O CONTRAST: CPT

## 2023-01-01 PROCEDURE — 6370000000 HC RX 637 (ALT 250 FOR IP): Performed by: EMERGENCY MEDICINE

## 2023-01-01 PROCEDURE — 99223 1ST HOSP IP/OBS HIGH 75: CPT | Performed by: INTERNAL MEDICINE

## 2023-01-01 PROCEDURE — 93005 ELECTROCARDIOGRAM TRACING: CPT | Performed by: INTERNAL MEDICINE

## 2023-01-01 PROCEDURE — 87635 SARS-COV-2 COVID-19 AMP PRB: CPT

## 2023-01-01 PROCEDURE — 94640 AIRWAY INHALATION TREATMENT: CPT

## 2023-01-01 PROCEDURE — 87040 BLOOD CULTURE FOR BACTERIA: CPT

## 2023-01-01 PROCEDURE — 83615 LACTATE (LD) (LDH) ENZYME: CPT

## 2023-01-01 PROCEDURE — 36415 COLL VENOUS BLD VENIPUNCTURE: CPT

## 2023-01-01 PROCEDURE — 6360000002 HC RX W HCPCS: Performed by: NURSE PRACTITIONER

## 2023-01-01 PROCEDURE — 85379 FIBRIN DEGRADATION QUANT: CPT

## 2023-01-01 PROCEDURE — 70450 CT HEAD/BRAIN W/O DYE: CPT

## 2023-01-01 PROCEDURE — 83690 ASSAY OF LIPASE: CPT

## 2023-01-01 PROCEDURE — 73562 X-RAY EXAM OF KNEE 3: CPT

## 2023-01-01 PROCEDURE — 96376 TX/PRO/DX INJ SAME DRUG ADON: CPT

## 2023-01-01 PROCEDURE — 87070 CULTURE OTHR SPECIMN AEROBIC: CPT

## 2023-01-01 PROCEDURE — 73523 X-RAY EXAM HIPS BI 5/> VIEWS: CPT

## 2023-01-01 PROCEDURE — 83605 ASSAY OF LACTIC ACID: CPT

## 2023-01-01 PROCEDURE — 71045 X-RAY EXAM CHEST 1 VIEW: CPT

## 2023-01-01 PROCEDURE — 85025 COMPLETE CBC W/AUTO DIFF WBC: CPT

## 2023-01-01 PROCEDURE — 80053 COMPREHEN METABOLIC PANEL: CPT

## 2023-01-01 RX ORDER — MORPHINE SULFATE 4 MG/ML
3 INJECTION, SOLUTION INTRAMUSCULAR; INTRAVENOUS ONCE
Status: COMPLETED | OUTPATIENT
Start: 2023-01-01 | End: 2023-01-01

## 2023-01-01 RX ORDER — ACETAMINOPHEN 325 MG/1
650 TABLET ORAL EVERY 6 HOURS PRN
Status: DISCONTINUED | OUTPATIENT
Start: 2023-01-01 | End: 2023-01-06 | Stop reason: HOSPADM

## 2023-01-01 RX ORDER — ONDANSETRON 2 MG/ML
4 INJECTION INTRAMUSCULAR; INTRAVENOUS ONCE
Status: COMPLETED | OUTPATIENT
Start: 2023-01-01 | End: 2023-01-01

## 2023-01-01 RX ORDER — ALBUTEROL SULFATE 2.5 MG/3ML
2.5 SOLUTION RESPIRATORY (INHALATION)
Status: DISCONTINUED | OUTPATIENT
Start: 2023-01-01 | End: 2023-01-06 | Stop reason: HOSPADM

## 2023-01-01 RX ORDER — ACETAMINOPHEN 650 MG/1
650 SUPPOSITORY RECTAL EVERY 6 HOURS PRN
Status: DISCONTINUED | OUTPATIENT
Start: 2023-01-01 | End: 2023-01-06 | Stop reason: HOSPADM

## 2023-01-01 RX ORDER — ENOXAPARIN SODIUM 100 MG/ML
40 INJECTION SUBCUTANEOUS DAILY
Status: DISCONTINUED | OUTPATIENT
Start: 2023-01-02 | End: 2023-01-03

## 2023-01-01 RX ORDER — SODIUM CHLORIDE 0.9 % (FLUSH) 0.9 %
5-40 SYRINGE (ML) INJECTION EVERY 12 HOURS SCHEDULED
Status: DISCONTINUED | OUTPATIENT
Start: 2023-01-01 | End: 2023-01-06 | Stop reason: HOSPADM

## 2023-01-01 RX ORDER — SODIUM CHLORIDE 9 MG/ML
INJECTION, SOLUTION INTRAVENOUS PRN
Status: DISCONTINUED | OUTPATIENT
Start: 2023-01-01 | End: 2023-01-06 | Stop reason: HOSPADM

## 2023-01-01 RX ORDER — DEXAMETHASONE SODIUM PHOSPHATE 10 MG/ML
6 INJECTION INTRAMUSCULAR; INTRAVENOUS ONCE
Status: DISCONTINUED | OUTPATIENT
Start: 2023-01-01 | End: 2023-01-01 | Stop reason: SDUPTHER

## 2023-01-01 RX ORDER — POLYETHYLENE GLYCOL 3350 17 G/17G
17 POWDER, FOR SOLUTION ORAL DAILY PRN
Status: DISCONTINUED | OUTPATIENT
Start: 2023-01-01 | End: 2023-01-05

## 2023-01-01 RX ORDER — DEXAMETHASONE SODIUM PHOSPHATE 10 MG/ML
6 INJECTION INTRAMUSCULAR; INTRAVENOUS EVERY 24 HOURS
Status: DISCONTINUED | OUTPATIENT
Start: 2023-01-01 | End: 2023-01-06 | Stop reason: HOSPADM

## 2023-01-01 RX ORDER — ONDANSETRON 4 MG/1
4 TABLET, ORALLY DISINTEGRATING ORAL EVERY 8 HOURS PRN
Status: DISCONTINUED | OUTPATIENT
Start: 2023-01-01 | End: 2023-01-06 | Stop reason: HOSPADM

## 2023-01-01 RX ORDER — IPRATROPIUM BROMIDE AND ALBUTEROL SULFATE 2.5; .5 MG/3ML; MG/3ML
1 SOLUTION RESPIRATORY (INHALATION) EVERY 4 HOURS PRN
Status: DISCONTINUED | OUTPATIENT
Start: 2023-01-01 | End: 2023-01-02

## 2023-01-01 RX ORDER — IPRATROPIUM BROMIDE AND ALBUTEROL SULFATE 2.5; .5 MG/3ML; MG/3ML
1 SOLUTION RESPIRATORY (INHALATION)
Status: DISCONTINUED | OUTPATIENT
Start: 2023-01-02 | End: 2023-01-02

## 2023-01-01 RX ORDER — SODIUM CHLORIDE 0.9 % (FLUSH) 0.9 %
5-40 SYRINGE (ML) INJECTION PRN
Status: DISCONTINUED | OUTPATIENT
Start: 2023-01-01 | End: 2023-01-06 | Stop reason: HOSPADM

## 2023-01-01 RX ORDER — ONDANSETRON 2 MG/ML
INJECTION INTRAMUSCULAR; INTRAVENOUS
Status: COMPLETED
Start: 2023-01-01 | End: 2023-01-01

## 2023-01-01 RX ORDER — GUAIFENESIN/DEXTROMETHORPHAN 100-10MG/5
5 SYRUP ORAL EVERY 4 HOURS PRN
Status: DISCONTINUED | OUTPATIENT
Start: 2023-01-01 | End: 2023-01-02

## 2023-01-01 RX ORDER — ONDANSETRON 2 MG/ML
4 INJECTION INTRAMUSCULAR; INTRAVENOUS EVERY 6 HOURS PRN
Status: DISCONTINUED | OUTPATIENT
Start: 2023-01-01 | End: 2023-01-06 | Stop reason: HOSPADM

## 2023-01-01 RX ADMIN — IPRATROPIUM BROMIDE AND ALBUTEROL SULFATE 1 AMPULE: .5; 3 SOLUTION RESPIRATORY (INHALATION) at 16:50

## 2023-01-01 RX ADMIN — ONDANSETRON 4 MG: 2 INJECTION INTRAMUSCULAR; INTRAVENOUS at 17:58

## 2023-01-01 RX ADMIN — IPRATROPIUM BROMIDE AND ALBUTEROL SULFATE 1 AMPULE: .5; 3 SOLUTION RESPIRATORY (INHALATION) at 23:46

## 2023-01-01 RX ADMIN — ONDANSETRON 4 MG: 2 INJECTION INTRAMUSCULAR; INTRAVENOUS at 20:39

## 2023-01-01 RX ADMIN — ONDANSETRON 4 MG: 2 INJECTION INTRAMUSCULAR; INTRAVENOUS at 23:38

## 2023-01-01 RX ADMIN — ONDANSETRON 4 MG: 2 INJECTION INTRAMUSCULAR; INTRAVENOUS at 19:31

## 2023-01-01 RX ADMIN — MORPHINE SULFATE 3 MG: 4 INJECTION INTRAVENOUS at 20:39

## 2023-01-01 ASSESSMENT — ENCOUNTER SYMPTOMS
WHEEZING: 0
SHORTNESS OF BREATH: 1
ABDOMINAL PAIN: 1
SORE THROAT: 1
COUGH: 1
DIARRHEA: 0
VOMITING: 1
CHEST TIGHTNESS: 0
NAUSEA: 1

## 2023-01-01 ASSESSMENT — PAIN SCALES - GENERAL
PAINLEVEL_OUTOF10: 9
PAINLEVEL_OUTOF10: 8

## 2023-01-01 ASSESSMENT — PAIN - FUNCTIONAL ASSESSMENT: PAIN_FUNCTIONAL_ASSESSMENT: 0-10

## 2023-01-01 NOTE — ED PROVIDER NOTES
[facility]  ED  Emergency Department Encounter  Emergency Medicine Resident     Pt Name:Erin Lemus  MRN: [id number]  [place] 1945  Date of evaluation: 1/1/23  PCP:  No primary care provider on file. CHIEF COMPLAINT       Chief Complaint   Patient presents with    Fall       HISTORY OF PRESENT ILLNESS  (Location/Symptom, Timing/Onset, Context/Setting, Quality, Duration, Modifying Factors, Severity.)      Cleveland Tomas is a 68 y.o. female who presents with past medical history as below, including atrial fibrillation, single left kidney, GERD, hypertension presenting for multiple concerns. This patient suffered a mechanical fall yesterday. At baseline, she uses a wheelchair. She was transferring from her wheelchair to another surface yesterday and had a mechanical fall onto her left side. She does not take blood thinners. Denies hitting her head but complains of significant headache and occasional difficulty with speech today. She also complains of left shoulder, bilateral hip, left knee pain. She is unable to differentiate whether this is acute on chronic or chronic. Furthermore, this patient was recently tested positive for COVID at her care home home. She is vaccinated x4. She complains of cough, shortness of breath. She also complains of chronic abdominal pain, she is unable to tell if this is recently worsened or chronic. There is associated nausea and vomiting. She denies dysuria, diarrhea.   PAST MEDICAL / SURGICAL / SOCIAL / FAMILY HISTORY      has a past medical history of A-fib ([facility] [address] Acquired absence of kidney, Adult hypertrophic pyloric stenosis, Agoraphobia with panic disorder, Agoraphobia without history of panic disorder, Allergic rhinitis, Anemia, unspecified, Anxiety, Anxiety disorder, Arthritis, Atrial fibrillation (HCC), Back pain, Bronchitis, Caffeine use, Cardiomegaly, Carpal tunnel syndrome, Cataracts, bilateral, Centriacinar emphysema ([address] Chronic kidney disease, stage III (moderate) (HCC), Chronic pain, Constipation, Constipation, COPD (chronic obstructive pulmonary disease) ([address] Cystitis with hematuria, Depression, Diaphragmatic hernia without obstruction or gangrene, Diarrhea, Difficulty walking, Dry eye syndrome of unspecified lacrimal gland, Esophageal obstruction, FOM (frequency of micturition), Foot drop, right foot, Gastro-esophageal reflux disease with esophagitis, with bleeding, GERD (gastroesophageal reflux disease), H/O gastric ulcer, HTN (hypertension), Hyperlipidemia, Hypertension, essential, Klebsiella pneumoniae (k. pneumoniae) as the cause of diseases classified elsewhere, Major depression, recurrent ([address] Mitral valve prolapse, Mixed hyperlipidemia, Mumps, Muscle weakness (generalized), MVP (mitral valve prolapse), Old myocardial infarction, Personal history of disease of digestive system, Psychophysiological insomnia, Recurrent UTI, S/P PICC central line placement, Sepsis ([address] Sinusitis, Tracheostomy in place [facility]), Ulcerative esophagitis, Urinary tract infection, site not specified, Uses wheelchair, Wears glasses, and Wellness examination. has a past surgical history that includes Hysterectomy; total nephrectomy; Tonsillectomy; Appendectomy; Cystoscopy; Ovary removal; Tubal ligation; rhinoplasty; Carpal tunnel release; Hand surgery; Total hip arthroplasty; eye surgery; Colonoscopy; joint replacement (Right); Gastric fundoplication (N/A, 63/47/0158); hc cath power picc triple (03/04/2020); Upper gastrointestinal endoscopy (N/A, 03/17/2020); tracheostomy (N/A, 03/27/2020); Gastrostomy tube placement (N/A, 03/27/2020); tracheostomy (N/A, 04/02/2020); Upper gastrointestinal endoscopy (N/A, 05/18/2020); Upper gastrointestinal endoscopy (05/18/2020); ERCP (05/21/2020); ERCP (05/21/2020); ERCP (05/21/2020); ERCP (05/21/2020); Cholecystectomy, laparoscopic (N/A, 05/22/2020); Upper gastrointestinal endoscopy (N/A, 07/06/2020);  Upper gastrointestinal endoscopy (N/A, 11/09/2020); Upper gastrointestinal endoscopy (02/08/2021); Gastrostomy tube placement (N/A, 2/8/2021); ERCP (N/A, 2/8/2021); ERCP (2/8/2021); Upper gastrointestinal endoscopy (N/A, 11/24/2021); hernia repair; Colonoscopy (N/A, 1/28/2022); IR NONTUNNELED VASCULAR CATHETER > 5 YEARS (5/10/2022); Upper gastrointestinal endoscopy (N/A, 9/8/2022); and Upper gastrointestinal endoscopy (N/A, 10/11/2022). Social History     Socioeconomic History    Marital status:      Spouse name: Not on file    Number of children: 2    Years of education: Not on file    Highest education level: Not on file   Occupational History    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   Tobacco Use    Smoking status: Former     Packs/day: 1.00     Years: 50.00     Pack years: 50.00     Types: Cigarettes    Smokeless tobacco: Never    Tobacco comments:     quit 1 year ago    Vaping Use    Vaping Use: Former    Substances: Always   Substance and Sexual Activity    Alcohol use: No    Drug use: No    Sexual activity: Never   Other Topics Concern    Not on file   Social History Narrative    Not on file     Social Determinants of Health     Financial Resource Strain: Not on file   Food Insecurity: Not on file   Transportation Needs: Not on file   Physical Activity: Not on file   Stress: Not on file   Social Connections: Not on file   Intimate Partner Violence: Not on file   Housing Stability: Not on file       Family History   Problem Relation Age of Onset    Cancer Mother         uterine    Heart Attack Mother     Heart Attack Father     Other Maternal Grandfather         foot gangrene    Other Paternal Grandmother         bleeding ulcers    Other Paternal Grandfather         lung cancer       Allergies:  Prednisone, Compazine [prochlorperazine maleate], Penicillin v potassium, and Penicillins    Home Medications:  Prior to Admission medications    Medication Sig Start Date End Date Taking?  Authorizing Provider   Sodium Phosphates (FLEET) 7-19 GM/118ML Place 1 enema rectally once as needed    Historical Provider, MD   aluminum & magnesium hydroxide-simethicone (MAALOX) 200-200-20 MG/5ML SUSP suspension Take 5 mLs by mouth as needed for Indigestion    Historical Provider, MD   traZODone (DESYREL) 100 MG tablet Take 100 mg by mouth nightly    Historical Provider, MD   ondansetron (ZOFRAN) 4 MG tablet Take 4 mg by mouth every 8 hours as needed for Nausea or Vomiting    Historical Provider, MD   albuterol (PROVENTIL) (2.5 MG/3ML) 0.083% nebulizer solution Take 2.5 mg by nebulization every 6 hours as needed for Wheezing    Historical Provider, MD   oxyCODONE-acetaminophen (PERCOCET) 5-325 MG per tablet Take 1 tablet by mouth every 8 hours as needed for Pain. Historical Provider, MD   carboxymethylcellulose 1 % ophthalmic solution Place 1 drop into both eyes in the morning and 1 drop at noon and 1 drop in the evening and 1 drop before bedtime. Historical Provider, MD   magnesium hydroxide (MILK OF MAGNESIA) 400 MG/5ML suspension Take 30 mLs by mouth Give 30ml by mouth as needed for constipation no BM x3 days    Historical Provider, MD   acetaminophen (TYLENOL) 500 MG tablet Take 1,000 mg by mouth in the morning and at bedtime    Historical Provider, MD   Cholecalciferol (VITAMIN D) 50 MCG (2000 UT) CAPS capsule Take 1 capsule by mouth daily 5/10/22   Susan Goetz DO   guaiFENesin (MUCINEX) 600 MG extended release tablet Take 400 mg by mouth in the morning. Indications: Cough. As needed for mucus.     Historical Provider, MD   Lactobacillus TABS Take 1 tablet by mouth 2 times daily Take One tablet by mouth two times daily    Historical Provider, MD   fluticasone (FLONASE) 50 MCG/ACT nasal spray 1 spray by Each Nostril route daily    Historical Provider, MD   loratadine (CLARITIN) 10 MG capsule Take 10 mg by mouth daily    Historical Provider, MD   clonazePAM (KLONOPIN) 0.5 MG tablet Take 0.5 mg by mouth 3 times daily as needed (tid).    Historical Provider, MD   metoclopramide (REGLAN) 5 MG tablet Take 1 tablet by mouth 4 times daily 1/4/22   Antoine Houser MD   polyethylene glycol (GLYCOLAX) 17 g packet Take 17 g by mouth daily as needed for Constipation 12/19/21   Anne Marie Ellis MD   QUEtiapine (SEROQUEL) 100 MG tablet Take 1 tablet by mouth nightly for 3 days 12/19/21 9/8/22  Anne Marie Ellis MD   QUEtiapine (SEROQUEL) 25 MG tablet Take 1 tablet by mouth every morning (before breakfast) for 3 days  Patient taking differently: Take 50 mg by mouth every morning (before breakfast) 12/19/21 9/8/22  Anne Marie Ellis MD   escitalopram (LEXAPRO) 10 MG tablet Take 20 mg by mouth in the morning. Historical Provider, MD   Folic Acid-Cholecalciferol 1-2000 MG-UNIT TABS Take by mouth    Historical Provider, MD   busPIRone (BUSPAR) 10 MG tablet Take 1 tablet by mouth 2 times daily 11/30/21   Sam Mera MD   pantoprazole (PROTONIX) 40 MG tablet Take 1 tablet by mouth 2 times daily 11/25/21 10/11/22  Anne Marie Ellis MD   bisacodyl (DULCOLAX) 10 MG suppository Place 10 mg rectally daily as needed for Constipation PRN for constipation no BM x4 days. Historical Provider, MD   budesonide-formoterol (SYMBICORT) 160-4.5 MCG/ACT AERO Inhale 2 puffs into the lungs 2 times daily    Historical Provider, MD   ferrous sulfate (FE TABS 325) 325 (65 Fe) MG EC tablet Take 1 tablet by mouth daily (with breakfast) 12/16/20   Franki Lundborg, APRN - NP   metoprolol succinate (TOPROL XL) 25 MG extended release tablet Take 1 tablet by mouth daily 5/30/20   Maureen Duke MD   sucralfate (CARAFATE) 1 GM tablet Take 1 tablet by mouth 4 times daily 4/6/20   Prince Garcia MD   Oyster Shell 500 MG TABS Take 500 mg by mouth 2 times daily     Historical Provider, MD   simvastatin (ZOCOR) 40 MG tablet Take 40 mg by mouth nightly.     Historical Provider, MD       REVIEW OF SYSTEMS    (2-9 systems for level 4, 10 or more for level 5)      Review of Systems   Constitutional:  Positive for activity change, appetite change, chills and fatigue. Negative for fever. HENT:  Positive for sore throat. Respiratory:  Positive for cough and shortness of breath. Negative for chest tightness and wheezing. Cardiovascular:  Negative for chest pain. Gastrointestinal:  Positive for abdominal pain, nausea and vomiting. Negative for diarrhea. Genitourinary:  Negative for difficulty urinating, dysuria, flank pain, hematuria and urgency. Musculoskeletal:  Positive for arthralgias and myalgias. Skin:  Negative for rash. Neurological:  Negative for weakness and headaches. PHYSICAL EXAM   (up to 7 for level 4, 8 or more for level 5)      INITIAL VITALS:   /76   Pulse 95   Temp 100 °F (37.8 °C) (Oral)   Resp 15   Ht 5' 2\" (1.575 m)   Wt 179 lb (81.2 kg)   SpO2 96%   BMI 32.74 kg/m²     Physical Exam  Vitals reviewed. Constitutional:       General: She is not in acute distress. Appearance: Normal appearance. She is not ill-appearing. HENT:      Head: Normocephalic and atraumatic. Right Ear: Tympanic membrane normal.      Left Ear: Tympanic membrane normal.      Nose: No congestion or rhinorrhea. Mouth/Throat:      Mouth: Mucous membranes are moist.      Pharynx: No posterior oropharyngeal erythema. Eyes:      Extraocular Movements: Extraocular movements intact. Pupils: Pupils are equal, round, and reactive to light. Cardiovascular:      Rate and Rhythm: Normal rate and regular rhythm. Heart sounds: Normal heart sounds. No murmur heard. Pulmonary:      Effort: Pulmonary effort is normal.      Breath sounds: Rhonchi present. Comments: Rhonchi in bilateral lower lung fields  Abdominal:      General: There is no distension. Palpations: Abdomen is soft. Tenderness: abdominal tenderness There is no right CVA tenderness or left CVA tenderness. Musculoskeletal:         General: Tenderness present. No deformity. Normal range of motion. Cervical back: Normal range of motion. No rigidity or tenderness. Comments: Pain with palpation, ROM of left shoulder, bilateral hips, left knee    There is no C/T/L spine tenderness to palpation   Skin:     General: Skin is warm and dry. Findings: No rash. Neurological:      General: No focal deficit present. Mental Status: She is alert and oriented to person, place, and time. Cranial Nerves: No cranial nerve deficit. Sensory: No sensory deficit. Motor: No weakness. DIFFERENTIAL  DIAGNOSIS     PLAN (LABS / IMAGING / EKG):  Orders Placed This Encounter   Procedures    Culture, Blood 1    COVID-19, Rapid    Culture, Blood 1    XR CHEST PORTABLE    XR SHOULDER LEFT (MIN 2 VIEWS)    XR KNEE LEFT (3 VIEWS)    CT HEAD WO CONTRAST    XR HIP W PELVIS MIN 5 VWS BILATERAL    CT ABDOMEN PELVIS WO CONTRAST Additional Contrast? None    CBC with Auto Differential    Lactic Acid    Comprehensive Metabolic Panel    Lipase    Magnesium    Urinalysis with Microscopic    Inpatient consult to Hospitalist       MEDICATIONS ORDERED:  Orders Placed This Encounter   Medications    ipratropium-albuterol (DUONEB) nebulizer solution 1 ampule     Order Specific Question:   Initiate RT Bronchodilator Protocol     Answer:   Yes - ED protocol    ondansetron (ZOFRAN) injection 4 mg    ondansetron (ZOFRAN) injection 4 mg    ondansetron (ZOFRAN) 4 MG/2ML injection     Dock, Irina: cabinet override    dexamethasone (DECADRON) injection 6 mg    morphine injection 3 mg    ondansetron (ZOFRAN) injection 4 mg         DIAGNOSTIC RESULTS / EMERGENCY DEPARTMENT COURSE / MDM   LAB RESULTS:  Results for orders placed or performed during the hospital encounter of 01/01/23   Culture, Blood 1    Specimen: Blood   Result Value Ref Range    Specimen Description . BLOOD     Special Requests R WRIST 4ML     Culture NO GROWTH <24 HRS    COVID-19, Rapid    Specimen: Nasopharyngeal Swab   Result Value Ref Range    Specimen Description . NASOPHARYNGEAL SWAB     SARS-CoV-2, Rapid DETECTED (A) Not Detected   Culture, Blood 1    Specimen: Blood   Result Value Ref Range    Specimen Description . BLOOD     Special Requests LT FOREARM 10ML     Culture NO GROWTH <24 HRS    CBC with Auto Differential   Result Value Ref Range    WBC 7.7 3.5 - 11.3 k/uL    RBC 4.93 3.95 - 5.11 m/uL    Hemoglobin 14.9 11.9 - 15.1 g/dL    Hematocrit 46.8 36.3 - 47.1 %    MCV 94.9 82.6 - 102.9 fL    MCH 30.2 25.2 - 33.5 pg    MCHC 31.8 28.4 - 34.8 g/dL    RDW 14.6 (H) 11.8 - 14.4 %    Platelets 455 (L) 699 - 453 k/uL    MPV 11.1 8.1 - 13.5 fL    NRBC Automated 0.0 0.0 per 100 WBC    RBC Morphology ANISOCYTOSIS PRESENT     Seg Neutrophils 76 (H) 36 - 65 %    Lymphocytes 9 (L) 24 - 43 %    Monocytes 13 (H) 3 - 12 %    Eosinophils % 1 1 - 4 %    Basophils 0 0 - 2 %    Immature Granulocytes 1 (H) 0 %    Segs Absolute 5.87 1.50 - 8.10 k/uL    Absolute Lymph # 0.71 (L) 1.10 - 3.70 k/uL    Absolute Mono # 0.97 0.10 - 1.20 k/uL    Absolute Eos # 0.04 0.00 - 0.44 k/uL    Basophils Absolute <0.03 0.00 - 0.20 k/uL    Absolute Immature Granulocyte 0.09 0.00 - 0.30 k/uL   Lactic Acid   Result Value Ref Range    Lactic Acid, Whole Blood 1.5 0.7 - 2.1 mmol/L   Comprehensive Metabolic Panel   Result Value Ref Range    Glucose 104 (H) 70 - 99 mg/dL    BUN 21 8 - 23 mg/dL    Creatinine 1.09 (H) 0.50 - 0.90 mg/dL    Est, Glom Filt Rate 52 (L) >60 mL/min/1.73m2    Calcium 10.0 8.6 - 10.4 mg/dL    Sodium 137 135 - 144 mmol/L    Potassium 4.6 3.7 - 5.3 mmol/L    Chloride 103 98 - 107 mmol/L    CO2 21 20 - 31 mmol/L    Anion Gap 13 9 - 17 mmol/L    Alkaline Phosphatase 115 (H) 35 - 104 U/L    ALT 21 5 - 33 U/L    AST 22 <32 U/L    Total Bilirubin 0.3 0.3 - 1.2 mg/dL    Total Protein 7.0 6.4 - 8.3 g/dL    Albumin 3.7 3.5 - 5.2 g/dL    Albumin/Globulin Ratio 1.1 1.0 - 2.5   Lipase   Result Value Ref Range    Lipase 16 13 - 60 U/L   Magnesium   Result Value Ref Range Magnesium 1.8 1.6 - 2.6 mg/dL       IMPRESSION: COVID 19; hypoxic respiratory failure    RADIOLOGY:  CT ABDOMEN PELVIS WO CONTRAST Additional Contrast? None   Final Result   Mild atelectasis or developing infiltrate in the right base. Small bilateral   pleural effusions. Mild hepatosplenomegaly and fatty infiltration of the liver      Status post right nephrectomy, cholecystectomy and hysterectomy      Large hiatal hernia         XR CHEST PORTABLE   Final Result   No acute process. XR SHOULDER LEFT (MIN 2 VIEWS)   Final Result   Mild to moderate osteoarthritis of the left knee. No acute bony abnormalities are noted of the bilateral hips, left shoulder   and left knee. Total right hip arthropasty without acute hardware complication. XR KNEE LEFT (3 VIEWS)   Final Result   Mild to moderate osteoarthritis of the left knee. No acute bony abnormalities are noted of the bilateral hips, left shoulder   and left knee. Total right hip arthropasty without acute hardware complication. XR HIP W PELVIS MIN 5 VWS BILATERAL   Final Result   Mild to moderate osteoarthritis of the left knee. No acute bony abnormalities are noted of the bilateral hips, left shoulder   and left knee. Total right hip arthropasty without acute hardware complication. CT HEAD WO CONTRAST   Final Result   Mild central and cortical cerebral atrophy. No acute intracranial abnormalities are noted. EKG      All EKG's are interpreted by the Emergency Department Physician who either signs or Co-signs this chart in the absence of a cardiologist.    EMERGENCY DEPARTMENT COURSE:  This patient presents with hypoxia to 86% on room air. She was placed on 3 L nasal cannula saturating the low 90s. Borderline temperature at 100 degrees. She otherwise has unremarkable vital signs. She is nauseous, and has arthralgias in the left shoulder, bilateral hips, left knee.   We will obtain x-rays of these. We will CT her head given the history of fall, although it does not appear she takes any blood thinners but she is complaining of headaches. Routine labs and infectious work-up started. This patient will be admitted. Reassess. 8:21 PM  Patient with mild increase in creatinine to 1.9. No leukocytosis or anemia. Platelets 177. Lipase and LFTs negative. Lactate negative. Urinalysis still pending. CT head, abdomen pelvis without contrast negative for acute process. X-rays of the extremities are also negative for fracture. This patient is currently saturating 96% on 5 L nasal cannula. Consult placed for admission for hypoxic respiratory failure, secondary to COVID-19. No notes of EC Admission Criteria type on file. PROCEDURES:      CONSULTS:  IP CONSULT TO HOSPITALIST    CRITICAL CARE:      FINAL IMPRESSION      1. COVID          DISPOSITION / PLAN     DISPOSITION Decision To Admit 01/01/2023 08:23:45 PM      PATIENT REFERRED TO:  No follow-up provider specified.     DISCHARGE MEDICATIONS:  New Prescriptions    No medications on file       Tam Harvey MD  Emergency Medicine Resident    (Please note that portions of thisnote were completed with a voice recognition program.  Efforts were made to edit the dictations but occasionally words are mis-transcribed.)        Tam Harvey MD  Resident  01/01/23 2034

## 2023-01-02 ENCOUNTER — APPOINTMENT (OUTPATIENT)
Dept: GENERAL RADIOLOGY | Age: 78
End: 2023-01-02
Payer: MEDICARE

## 2023-01-02 PROBLEM — J96.01 ACUTE RESPIRATORY FAILURE WITH HYPOXIA (HCC): Status: ACTIVE | Noted: 2023-01-02

## 2023-01-02 PROBLEM — U07.1 COVID: Status: ACTIVE | Noted: 2023-01-02

## 2023-01-02 PROBLEM — R79.82 ELEVATED C-REACTIVE PROTEIN (CRP): Status: ACTIVE | Noted: 2023-01-02

## 2023-01-02 PROBLEM — J44.9 CHRONIC OBSTRUCTIVE PULMONARY DISEASE (HCC): Status: ACTIVE | Noted: 2023-01-02

## 2023-01-02 PROBLEM — R09.02 HYPOXIA: Status: ACTIVE | Noted: 2023-01-02

## 2023-01-02 LAB
ABSOLUTE EOS #: 0 K/UL (ref 0–0.4)
ABSOLUTE IMMATURE GRANULOCYTE: 0 K/UL (ref 0–0.3)
ABSOLUTE LYMPH #: 0.72 K/UL (ref 1–4.8)
ABSOLUTE MONO #: 0.2 K/UL (ref 0.1–0.8)
ALBUMIN SERPL-MCNC: 3.9 G/DL (ref 3.5–5.2)
ALBUMIN/GLOBULIN RATIO: 1.2 (ref 1–2.5)
ALP BLD-CCNC: 109 U/L (ref 35–104)
ALT SERPL-CCNC: 19 U/L (ref 5–33)
ANION GAP SERPL CALCULATED.3IONS-SCNC: 12 MMOL/L (ref 9–17)
AST SERPL-CCNC: 19 U/L
BASOPHILS # BLD: 0 % (ref 0–2)
BASOPHILS ABSOLUTE: 0 K/UL (ref 0–0.2)
BILIRUB SERPL-MCNC: 0.3 MG/DL (ref 0.3–1.2)
BILIRUBIN URINE: NEGATIVE
BUN BLDV-MCNC: 22 MG/DL (ref 8–23)
C-REACTIVE PROTEIN: 149.5 MG/L (ref 0–5)
C-REACTIVE PROTEIN: 151.5 MG/L (ref 0–5)
CALCIUM SERPL-MCNC: 9.2 MG/DL (ref 8.6–10.4)
CHLORIDE BLD-SCNC: 94 MMOL/L (ref 98–107)
CO2: 26 MMOL/L (ref 20–31)
COLOR: YELLOW
CREAT SERPL-MCNC: 0.98 MG/DL (ref 0.5–0.9)
D-DIMER QUANTITATIVE: 0.7 MG/L FEU
EKG ATRIAL RATE: 94 BPM
EKG P AXIS: 36 DEGREES
EKG P-R INTERVAL: 160 MS
EKG Q-T INTERVAL: 350 MS
EKG QRS DURATION: 82 MS
EKG QTC CALCULATION (BAZETT): 437 MS
EKG R AXIS: 44 DEGREES
EKG T AXIS: 21 DEGREES
EKG VENTRICULAR RATE: 94 BPM
EOSINOPHILS RELATIVE PERCENT: 0 % (ref 1–4)
EPITHELIAL CELLS UA: ABNORMAL /HPF (ref 0–5)
FERRITIN: 171 NG/ML (ref 13–150)
FIBRINOGEN: 539 MG/DL (ref 140–420)
GFR SERPL CREATININE-BSD FRML MDRD: 59 ML/MIN/1.73M2
GLUCOSE BLD-MCNC: 157 MG/DL (ref 70–99)
GLUCOSE URINE: NEGATIVE
HCT VFR BLD CALC: 47.7 % (ref 36.3–47.1)
HEMOGLOBIN: 15.1 G/DL (ref 11.9–15.1)
IMMATURE GRANULOCYTES: 0 %
INR BLD: 1
KETONES, URINE: NEGATIVE
LACTATE DEHYDROGENASE: 180 U/L (ref 135–214)
LACTIC ACID, WHOLE BLOOD: 1.5 MMOL/L (ref 0.7–2.1)
LEUKOCYTE ESTERASE, URINE: NEGATIVE
LYMPHOCYTES # BLD: 11 % (ref 24–44)
MCH RBC QN AUTO: 29.7 PG (ref 25.2–33.5)
MCHC RBC AUTO-ENTMCNC: 31.7 G/DL (ref 28.4–34.8)
MCV RBC AUTO: 93.7 FL (ref 82.6–102.9)
MONOCYTES # BLD: 3 % (ref 1–7)
MORPHOLOGY: ABNORMAL
NITRITE, URINE: NEGATIVE
NRBC AUTOMATED: 0 PER 100 WBC
PARTIAL THROMBOPLASTIN TIME: 26.8 SEC (ref 20.5–30.5)
PDW BLD-RTO: 14.5 % (ref 11.8–14.4)
PH UA: 6 (ref 5–8)
PLATELET # BLD: ABNORMAL K/UL (ref 138–453)
PLATELET, FLUORESCENCE: 127 K/UL (ref 138–453)
PLATELET, IMMATURE FRACTION: 4.9 % (ref 1.1–10.3)
POTASSIUM SERPL-SCNC: 4 MMOL/L (ref 3.7–5.3)
PROTEIN UA: ABNORMAL
PROTHROMBIN TIME: 11.1 SEC (ref 9.1–12.3)
RBC # BLD: 5.09 M/UL (ref 3.95–5.11)
RBC UA: ABNORMAL /HPF (ref 0–4)
SEG NEUTROPHILS: 86 % (ref 36–66)
SEGMENTED NEUTROPHILS ABSOLUTE COUNT: 5.58 K/UL (ref 1.8–7.7)
SODIUM BLD-SCNC: 132 MMOL/L (ref 135–144)
SPECIFIC GRAVITY UA: 1.02 (ref 1–1.03)
TOTAL PROTEIN: 7.2 G/DL (ref 6.4–8.3)
TROPONIN, HIGH SENSITIVITY: 14 NG/L (ref 0–14)
TURBIDITY: CLEAR
URINE HGB: ABNORMAL
UROBILINOGEN, URINE: NORMAL
VITAMIN D 25-HYDROXY: 37.2 NG/ML
WBC # BLD: 6.5 K/UL (ref 3.5–11.3)
WBC UA: ABNORMAL /HPF (ref 0–5)

## 2023-01-02 PROCEDURE — 6370000000 HC RX 637 (ALT 250 FOR IP): Performed by: INTERNAL MEDICINE

## 2023-01-02 PROCEDURE — 2580000003 HC RX 258: Performed by: NURSE PRACTITIONER

## 2023-01-02 PROCEDURE — 6360000002 HC RX W HCPCS: Performed by: NURSE PRACTITIONER

## 2023-01-02 PROCEDURE — 80053 COMPREHEN METABOLIC PANEL: CPT

## 2023-01-02 PROCEDURE — 6370000000 HC RX 637 (ALT 250 FOR IP): Performed by: NURSE PRACTITIONER

## 2023-01-02 PROCEDURE — A4216 STERILE WATER/SALINE, 10 ML: HCPCS | Performed by: INTERNAL MEDICINE

## 2023-01-02 PROCEDURE — 6360000002 HC RX W HCPCS: Performed by: INTERNAL MEDICINE

## 2023-01-02 PROCEDURE — 2580000003 HC RX 258: Performed by: INTERNAL MEDICINE

## 2023-01-02 PROCEDURE — 93010 ELECTROCARDIOGRAM REPORT: CPT | Performed by: INTERNAL MEDICINE

## 2023-01-02 PROCEDURE — 85384 FIBRINOGEN ACTIVITY: CPT

## 2023-01-02 PROCEDURE — 85730 THROMBOPLASTIN TIME PARTIAL: CPT

## 2023-01-02 PROCEDURE — 86140 C-REACTIVE PROTEIN: CPT

## 2023-01-02 PROCEDURE — 82306 VITAMIN D 25 HYDROXY: CPT

## 2023-01-02 PROCEDURE — 83605 ASSAY OF LACTIC ACID: CPT

## 2023-01-02 PROCEDURE — 99232 SBSQ HOSP IP/OBS MODERATE 35: CPT | Performed by: INTERNAL MEDICINE

## 2023-01-02 PROCEDURE — 85055 RETICULATED PLATELET ASSAY: CPT

## 2023-01-02 PROCEDURE — C9113 INJ PANTOPRAZOLE SODIUM, VIA: HCPCS | Performed by: INTERNAL MEDICINE

## 2023-01-02 PROCEDURE — XW033E5 INTRODUCTION OF REMDESIVIR ANTI-INFECTIVE INTO PERIPHERAL VEIN, PERCUTANEOUS APPROACH, NEW TECHNOLOGY GROUP 5: ICD-10-PCS | Performed by: INTERNAL MEDICINE

## 2023-01-02 PROCEDURE — 99222 1ST HOSP IP/OBS MODERATE 55: CPT | Performed by: INTERNAL MEDICINE

## 2023-01-02 PROCEDURE — 71045 X-RAY EXAM CHEST 1 VIEW: CPT

## 2023-01-02 PROCEDURE — 2060000000 HC ICU INTERMEDIATE R&B

## 2023-01-02 PROCEDURE — 85379 FIBRIN DEGRADATION QUANT: CPT

## 2023-01-02 PROCEDURE — 81001 URINALYSIS AUTO W/SCOPE: CPT

## 2023-01-02 PROCEDURE — 84484 ASSAY OF TROPONIN QUANT: CPT

## 2023-01-02 PROCEDURE — 85610 PROTHROMBIN TIME: CPT

## 2023-01-02 PROCEDURE — 87641 MR-STAPH DNA AMP PROBE: CPT

## 2023-01-02 PROCEDURE — 85025 COMPLETE CBC W/AUTO DIFF WBC: CPT

## 2023-01-02 RX ORDER — CLONAZEPAM 1 MG/1
0.5 TABLET ORAL 3 TIMES DAILY
Status: DISCONTINUED | OUTPATIENT
Start: 2023-01-02 | End: 2023-01-06 | Stop reason: HOSPADM

## 2023-01-02 RX ORDER — QUETIAPINE FUMARATE 25 MG/1
50 TABLET, FILM COATED ORAL DAILY
Status: DISCONTINUED | OUTPATIENT
Start: 2023-01-02 | End: 2023-01-06 | Stop reason: HOSPADM

## 2023-01-02 RX ORDER — ASPIRIN 81 MG/1
81 TABLET ORAL DAILY
Status: DISCONTINUED | OUTPATIENT
Start: 2023-01-02 | End: 2023-01-06 | Stop reason: HOSPADM

## 2023-01-02 RX ORDER — METOCLOPRAMIDE HYDROCHLORIDE 5 MG/ML
5 INJECTION INTRAMUSCULAR; INTRAVENOUS EVERY 6 HOURS PRN
Status: DISCONTINUED | OUTPATIENT
Start: 2023-01-02 | End: 2023-01-04

## 2023-01-02 RX ORDER — POLYETHYLENE GLYCOL 3350 17 G/17G
17 POWDER, FOR SOLUTION ORAL DAILY PRN
COMMUNITY
End: 2023-01-02

## 2023-01-02 RX ORDER — ATORVASTATIN CALCIUM 40 MG/1
40 TABLET, FILM COATED ORAL DAILY
Status: DISCONTINUED | OUTPATIENT
Start: 2023-01-02 | End: 2023-01-06 | Stop reason: HOSPADM

## 2023-01-02 RX ORDER — QUETIAPINE FUMARATE 100 MG/1
100 TABLET, FILM COATED ORAL NIGHTLY
Status: DISCONTINUED | OUTPATIENT
Start: 2023-01-02 | End: 2023-01-06 | Stop reason: HOSPADM

## 2023-01-02 RX ORDER — BENZONATATE 100 MG/1
200 CAPSULE ORAL 3 TIMES DAILY PRN
Status: DISCONTINUED | OUTPATIENT
Start: 2023-01-02 | End: 2023-01-06 | Stop reason: HOSPADM

## 2023-01-02 RX ORDER — BUSPIRONE HYDROCHLORIDE 10 MG/1
10 TABLET ORAL 2 TIMES DAILY
Status: DISCONTINUED | OUTPATIENT
Start: 2023-01-02 | End: 2023-01-06 | Stop reason: HOSPADM

## 2023-01-02 RX ORDER — ATORVASTATIN CALCIUM 20 MG/1
20 TABLET, FILM COATED ORAL DAILY
Status: DISCONTINUED | OUTPATIENT
Start: 2023-01-02 | End: 2023-01-02

## 2023-01-02 RX ORDER — ESCITALOPRAM OXALATE 20 MG/1
1 TABLET ORAL DAILY
COMMUNITY
Start: 2022-12-05

## 2023-01-02 RX ORDER — QUETIAPINE FUMARATE 50 MG/1
1 TABLET, FILM COATED ORAL DAILY
COMMUNITY
Start: 2022-12-19

## 2023-01-02 RX ORDER — GABAPENTIN 300 MG/1
300 CAPSULE ORAL 3 TIMES DAILY
Status: DISCONTINUED | OUTPATIENT
Start: 2023-01-02 | End: 2023-01-06 | Stop reason: HOSPADM

## 2023-01-02 RX ORDER — AMITRIPTYLINE HYDROCHLORIDE 25 MG/1
1 TABLET, FILM COATED ORAL 3 TIMES DAILY
COMMUNITY
Start: 2022-12-27

## 2023-01-02 RX ORDER — CLONAZEPAM 0.5 MG/1
0.5 TABLET ORAL ONCE
Status: COMPLETED | OUTPATIENT
Start: 2023-01-02 | End: 2023-01-02

## 2023-01-02 RX ORDER — CLONAZEPAM 0.5 MG/1
0.5 TABLET ORAL 3 TIMES DAILY
Status: DISCONTINUED | OUTPATIENT
Start: 2023-01-02 | End: 2023-01-02

## 2023-01-02 RX ORDER — FESOTERODINE FUMARATE 4 MG/1
1 TABLET, EXTENDED RELEASE ORAL
COMMUNITY
Start: 2022-12-05

## 2023-01-02 RX ORDER — 0.9 % SODIUM CHLORIDE 0.9 %
30 INTRAVENOUS SOLUTION INTRAVENOUS PRN
Status: DISCONTINUED | OUTPATIENT
Start: 2023-01-02 | End: 2023-01-06 | Stop reason: HOSPADM

## 2023-01-02 RX ORDER — AMITRIPTYLINE HYDROCHLORIDE 25 MG/1
25 TABLET, FILM COATED ORAL 3 TIMES DAILY
Status: DISCONTINUED | OUTPATIENT
Start: 2023-01-02 | End: 2023-01-06 | Stop reason: HOSPADM

## 2023-01-02 RX ORDER — CLONAZEPAM 0.5 MG/1
0.5 TABLET ORAL EVERY 12 HOURS PRN
Status: DISCONTINUED | OUTPATIENT
Start: 2023-01-02 | End: 2023-01-02

## 2023-01-02 RX ORDER — CIPROFLOXACIN 500 MG/1
500 TABLET, FILM COATED ORAL EVERY 12 HOURS SCHEDULED
Status: DISCONTINUED | OUTPATIENT
Start: 2023-01-02 | End: 2023-01-02

## 2023-01-02 RX ORDER — METOCLOPRAMIDE HYDROCHLORIDE 5 MG/ML
10 INJECTION INTRAMUSCULAR; INTRAVENOUS ONCE
Status: COMPLETED | OUTPATIENT
Start: 2023-01-02 | End: 2023-01-02

## 2023-01-02 RX ORDER — TRAZODONE HYDROCHLORIDE 100 MG/1
100 TABLET ORAL NIGHTLY
Status: DISCONTINUED | OUTPATIENT
Start: 2023-01-02 | End: 2023-01-06 | Stop reason: HOSPADM

## 2023-01-02 RX ORDER — GABAPENTIN 300 MG/1
1 CAPSULE ORAL 3 TIMES DAILY
COMMUNITY
Start: 2022-12-30

## 2023-01-02 RX ORDER — CLONAZEPAM 0.5 MG/1
0.5 TABLET ORAL EVERY 8 HOURS
Status: DISCONTINUED | OUTPATIENT
Start: 2023-01-02 | End: 2023-01-02

## 2023-01-02 RX ORDER — GUAIFENESIN 600 MG/1
600 TABLET, EXTENDED RELEASE ORAL 2 TIMES DAILY
Status: DISCONTINUED | OUTPATIENT
Start: 2023-01-02 | End: 2023-01-06 | Stop reason: HOSPADM

## 2023-01-02 RX ORDER — ESCITALOPRAM OXALATE 10 MG/1
20 TABLET ORAL DAILY
Status: DISCONTINUED | OUTPATIENT
Start: 2023-01-02 | End: 2023-01-06 | Stop reason: HOSPADM

## 2023-01-02 RX ORDER — METOPROLOL SUCCINATE 25 MG/1
25 TABLET, EXTENDED RELEASE ORAL DAILY
Status: DISCONTINUED | OUTPATIENT
Start: 2023-01-02 | End: 2023-01-04

## 2023-01-02 RX ORDER — OXYCODONE HYDROCHLORIDE AND ACETAMINOPHEN 5; 325 MG/1; MG/1
1 TABLET ORAL EVERY 8 HOURS PRN
Status: DISCONTINUED | OUTPATIENT
Start: 2023-01-02 | End: 2023-01-06 | Stop reason: HOSPADM

## 2023-01-02 RX ORDER — TRAZODONE HYDROCHLORIDE 100 MG/1
100 TABLET ORAL
Status: COMPLETED | OUTPATIENT
Start: 2023-01-02 | End: 2023-01-02

## 2023-01-02 RX ORDER — IPRATROPIUM BROMIDE AND ALBUTEROL SULFATE 2.5; .5 MG/3ML; MG/3ML
1 SOLUTION RESPIRATORY (INHALATION) EVERY 4 HOURS PRN
Status: DISCONTINUED | OUTPATIENT
Start: 2023-01-02 | End: 2023-01-06 | Stop reason: HOSPADM

## 2023-01-02 RX ADMIN — GABAPENTIN 300 MG: 300 CAPSULE ORAL at 21:08

## 2023-01-02 RX ADMIN — CLONAZEPAM 0.5 MG: 0.5 TABLET ORAL at 15:56

## 2023-01-02 RX ADMIN — AMITRIPTYLINE HYDROCHLORIDE 25 MG: 25 TABLET, FILM COATED ORAL at 15:15

## 2023-01-02 RX ADMIN — ACETAMINOPHEN 650 MG: 325 TABLET ORAL at 06:10

## 2023-01-02 RX ADMIN — BUSPIRONE HYDROCHLORIDE 10 MG: 10 TABLET ORAL at 21:09

## 2023-01-02 RX ADMIN — CLONAZEPAM 0.5 MG: 1 TABLET ORAL at 21:09

## 2023-01-02 RX ADMIN — QUETIAPINE FUMARATE 100 MG: 100 TABLET ORAL at 21:08

## 2023-01-02 RX ADMIN — DEXAMETHASONE SODIUM PHOSPHATE 6 MG: 10 INJECTION, SOLUTION INTRAMUSCULAR; INTRAVENOUS at 00:34

## 2023-01-02 RX ADMIN — AMITRIPTYLINE HYDROCHLORIDE 25 MG: 25 TABLET, FILM COATED ORAL at 08:33

## 2023-01-02 RX ADMIN — ESCITALOPRAM OXALATE 20 MG: 10 TABLET ORAL at 08:33

## 2023-01-02 RX ADMIN — CLONAZEPAM 0.5 MG: 0.5 TABLET ORAL at 10:38

## 2023-01-02 RX ADMIN — TRAZODONE HYDROCHLORIDE 100 MG: 100 TABLET ORAL at 21:09

## 2023-01-02 RX ADMIN — Medication 1250 MG: at 17:10

## 2023-01-02 RX ADMIN — BUSPIRONE HYDROCHLORIDE 10 MG: 10 TABLET ORAL at 08:33

## 2023-01-02 RX ADMIN — GUAIFENESIN AND DEXTROMETHORPHAN 5 ML: 100; 10 SYRUP ORAL at 00:37

## 2023-01-02 RX ADMIN — GABAPENTIN 300 MG: 300 CAPSULE ORAL at 15:15

## 2023-01-02 RX ADMIN — GUAIFENESIN 600 MG: 600 TABLET, EXTENDED RELEASE ORAL at 21:08

## 2023-01-02 RX ADMIN — REMDESIVIR 200 MG: 100 INJECTION, POWDER, LYOPHILIZED, FOR SOLUTION INTRAVENOUS at 20:17

## 2023-01-02 RX ADMIN — ONDANSETRON 4 MG: 2 INJECTION INTRAMUSCULAR; INTRAVENOUS at 08:05

## 2023-01-02 RX ADMIN — BUSPIRONE HYDROCHLORIDE 10 MG: 10 TABLET ORAL at 02:07

## 2023-01-02 RX ADMIN — ATORVASTATIN CALCIUM 40 MG: 80 TABLET, FILM COATED ORAL at 21:08

## 2023-01-02 RX ADMIN — METOPROLOL SUCCINATE 25 MG: 25 TABLET, EXTENDED RELEASE ORAL at 08:31

## 2023-01-02 RX ADMIN — GABAPENTIN 300 MG: 300 CAPSULE ORAL at 08:24

## 2023-01-02 RX ADMIN — TRAZODONE HYDROCHLORIDE 100 MG: 100 TABLET ORAL at 02:07

## 2023-01-02 RX ADMIN — QUETIAPINE FUMARATE 50 MG: 100 TABLET ORAL at 08:24

## 2023-01-02 RX ADMIN — ONDANSETRON 4 MG: 2 INJECTION INTRAMUSCULAR; INTRAVENOUS at 06:09

## 2023-01-02 RX ADMIN — METOCLOPRAMIDE 10 MG: 5 INJECTION, SOLUTION INTRAMUSCULAR; INTRAVENOUS at 15:14

## 2023-01-02 RX ADMIN — OXYCODONE HYDROCHLORIDE AND ACETAMINOPHEN 1 TABLET: 5; 325 TABLET ORAL at 17:12

## 2023-01-02 RX ADMIN — SODIUM CHLORIDE, PRESERVATIVE FREE 40 MG: 5 INJECTION INTRAVENOUS at 21:28

## 2023-01-02 RX ADMIN — SODIUM CHLORIDE, PRESERVATIVE FREE 10 ML: 5 INJECTION INTRAVENOUS at 00:59

## 2023-01-02 RX ADMIN — AMITRIPTYLINE HYDROCHLORIDE 25 MG: 25 TABLET, FILM COATED ORAL at 21:08

## 2023-01-02 ASSESSMENT — ENCOUNTER SYMPTOMS
VOMITING: 1
BACK PAIN: 0
SHORTNESS OF BREATH: 1
WHEEZING: 0
CHEST TIGHTNESS: 0
DIARRHEA: 0
ABDOMINAL PAIN: 1
ABDOMINAL DISTENTION: 0
CONSTIPATION: 0
STRIDOR: 0
COUGH: 1
NAUSEA: 1
APNEA: 0

## 2023-01-02 ASSESSMENT — PAIN DESCRIPTION - LOCATION: LOCATION: KNEE;HIP

## 2023-01-02 ASSESSMENT — PAIN SCALES - GENERAL
PAINLEVEL_OUTOF10: 7
PAINLEVEL_OUTOF10: 8

## 2023-01-02 ASSESSMENT — PAIN DESCRIPTION - ORIENTATION: ORIENTATION: LEFT

## 2023-01-02 NOTE — PROGRESS NOTES
[address] [facility] Pharmacokinetic Monitoring Service - Vancomycin     Michael Mccain is a 68 y.o. female starting on vancomycin therapy for sepsis. Pharmacy consulted by AMANUEL FRANCO [facility] NP for ID for monitoring and adjustment. Target Concentration: Goal AUC/MOHAN 400-600 mg*hr/L    Additional Antimicrobials: ciprofloxacin    Pertinent Laboratory Values: Wt Readings from Last 1 Encounters:   01/01/23 179 lb (81.2 kg)     Temp Readings from Last 1 Encounters:   01/02/23 98.4 °F (36.9 °C) (Oral)     Estimated Creatinine Clearance: 47 mL/min (A) (based on SCr of 0.98 mg/dL (H)). Recent Labs     01/01/23  1702 01/02/23  0559   CREATININE 1.09* 0.98*   WBC 7.7 6.5     Procalcitonin: N/A    Pertinent Cultures:  Culture Date Source Results   01/01 Resp GPC in clusters   MRSA Nasal Swab: was ordered by provider, awaiting results.     Plan:  Dosing recommendations based on Bayesian software  Start vancomycin 1250 mg IVPB q24h  Anticipated AUC of 460 and trough concentration of 14 at steady state  Renal labs as indicated   Vancomycin concentration not ordered yet  Pharmacy will continue to monitor patient and adjust therapy as indicated    Thank you for the consult,  Dorie Walker, [facility]  1/2/2023 4:08 PM

## 2023-01-02 NOTE — CONSULTS
[facility] - Initial Consult Note COVID 19 Patient  Today's Date and Time: 1/2/2023, 3:32 PM    Impression :     COVID 19 Confirmed Infection  Covid tests:  1/1/23: Detected  Vaccination status:  Patient received 3 doses of Dipti Eagles in 2021. Overdue for Covid booster since 2/19/22. Current infection will count as a 4th dose of vaccine  She would still need a Bivalent booster in April 2023  Acute hypoxic respiratory distress  Elevated CRP  Fall from wheelchair  Paroxysmal Afib  HTN  GERD  COPD  Solitary left kidney  Chronic abdominal pain  Paraplegia  Wheel-chair bound  Chronic gastritis  Anxiety  Hx MDRO and ESBL  PCN allergy  DNR-CCA    Recommendations:   Antibiotic treatment:  Monitor off antibiotics   Covid Rx:    Remdesivir-Requested 1-2-23. Decadron-Not indicated at this stage in her illness-1st week of illness is the viral replication stage  Actemra-Not indicated at this time  Paxlovid-ordered 1/2/22 but she has too many drug interactions  Molnupiravir would be an option, if problems with remdesivir  Monitor CRP      Medical Decision Making/Summary/Discussion:1/2/2023     Patient admitted with COVID 19 infection    Infection Control Recommendations   Hessel Precautions  Contact-Hx ESBL and MDRO UTI  Airborne isolation  Droplet Isolation until 1/11/22    Antimicrobial Stewardship Recommendations     Discontinuation of therapy    Coordination of Outpatient Care:   Estimated Length of IV antimicrobials:TBD  Patient will need Midline Catheter Insertion: TBD  Patient will need PICC line Insertion: No  Patient will need: Home IV , [place],  SNF,  LTAC:TBD  Patient will need outpatient wound care:No    Chief complaint/reason for consultation:   Concern for COVID infection      History of Present Illness:   Selina Marinelli is a 68y.o.-year-old female who was initially admitted on 1/1/2023.  Patient seen at the request of Dr. Shakila Vickers:    Patient, with a complicated medical history, presented through ER with complaints of a fall from her wheelchair when trying to get into bed. She denied hitting her head and fell on her left side. She had just tested positive for Covid at her ECF and is experiencing some shortness of breath with nausea and vomiting. The patient's vital signs are stable other than hypoxia requiring 4 L NC.   CXR showed no acute process but CT revealed developing atelectasis/infiltrate in the RLL. No other acute abnormalities were noted on imaging studies. Sputum culture is growing GPCC and GNR. IV vancomycin and cipro were initiated pending finalized culture results. The patient has a PCN allergy. Paxlovid was initiated for Covid. RADIOLOGY:  CT ABDOMEN PELVIS WO CONTRAST Additional Contrast? None   Final Result   Mild atelectasis or developing infiltrate in the right base. Small bilateral   pleural effusions. Mild hepatosplenomegaly and fatty infiltration of the liver       Status post right nephrectomy, cholecystectomy and hysterectomy       Large hiatal hernia           XR CHEST PORTABLE   Final Result   No acute process. XR SHOULDER LEFT (MIN 2 VIEWS)   Final Result   Mild to moderate osteoarthritis of the left knee. No acute bony abnormalities are noted of the bilateral hips, left shoulder   and left knee. Total right hip arthropasty without acute hardware complication. XR KNEE LEFT (3 VIEWS)   Final Result   Mild to moderate osteoarthritis of the left knee. No acute bony abnormalities are noted of the bilateral hips, left shoulder   and left knee. Total right hip arthropasty without acute hardware complication. XR HIP W PELVIS MIN 5 VWS BILATERAL   Final Result   Mild to moderate osteoarthritis of the left knee. No acute bony abnormalities are noted of the bilateral hips, left shoulder   and left knee.        Total right hip arthropasty without acute hardware complication. CT HEAD WO CONTRAST   Final Result   Mild central and cortical cerebral atrophy. No acute intracranial abnormalities are noted. Patient admitted because of concerns with COVID 19.    CURRENT EVALUATION : 1/2/2023    Afebrile  VS stable    Patient exhibiting respiratory distress. yes  Respiratory secretions: yes    Patient receiving supplemental oxygen: 4 L NC  RR:16  02 sat:96    QTc: 437    NEWS Score: 0-4 Low risk group; 5-6: Medium risk group; 7 or above: High risk group  Parameters 3 2 1 0 1 2 3   Age    < 72   ? 65   RR ? 8  9-11 12-20  21-24 ? 25   O2 Sats ? 91 92-93 94-95 ? 96      Suppl O2  Yes  No      SBP ? 90  101-110 111-219   ? 220   HR ? 40  41-50 51-90  111-130 ? 131   Consciousness    Alert   Drowsiness, lethargy, or confusion   Temperature ? 35.0 C (95.0 F)  35.1-36.0 C 95.1-96.9 F 36.1-38.0 C 97.0-100.4 F 38.1-39.0 C 100.5-102.3 F ? 39.1 C ? 102.4 F      NEWS Score:  1/2/22: 5 moderate risk for ICU    Overall Daily Picture: Worsening    Presence of secondary bacterial Infection:  No  Additional antibiotics: No    Labs, X rays reviewed: 1/2/2023    BUN:22  Cr:0.98    WBC:6.5  Hb:15.1  Plat: 127    Absolute Neutrophils:5.5  Absolute Lymphocytes:0.72  Neutrophil/Lymphocyte Ratio: 7.6 high risk for worsening viral illness    CRP:151.5  Ferritin:171  LDH: 180    Cultures:  Urine:    Blood:  1/1/23: No growth thus far  Sputum :  1/1/23: GNR and GPCC-finalized culture pending  MRSA Nares:  1/2/23: pending    IMAGING:    CXR:   1/1/23 7/31/22      CAT:  1/1/23      Discussed with patient, RN, CC, IM. I have personally reviewed the past medical history, past surgical history, medications, social history, and family history, and I have updated the database accordingly.   Past Medical History:     Past Medical History:   Diagnosis Date    A-fib [facility])     Acquired absence of kidney Adult hypertrophic pyloric stenosis     Agoraphobia with panic disorder     Agoraphobia without history of panic disorder     Allergic rhinitis     Anemia, unspecified     Anxiety     Anxiety disorder     Arthritis     Atrial fibrillation (HCC)     Back pain     Bronchitis     Caffeine use     8 coffee / day    Cardiomegaly     Carpal tunnel syndrome     Cataracts, bilateral     Centriacinar emphysema (HCC)     Chronic kidney disease, stage III (moderate) ([facility])     Chronic pain     Constipation     Constipation     COPD (chronic obstructive pulmonary disease) ([facility])     Emphysema    Cystitis with hematuria     Depression     Diaphragmatic hernia without obstruction or gangrene     Diarrhea     Difficulty walking     Dry eye syndrome of unspecified lacrimal gland     Esophageal obstruction     FOM (frequency of micturition)     Foot drop, right foot     Gastro-esophageal reflux disease with esophagitis, with bleeding     GERD (gastroesophageal reflux disease)     H/O gastric ulcer     HTN (hypertension)     Hyperlipidemia     Hypertension, essential     Klebsiella pneumoniae (k. pneumoniae) as the cause of diseases classified elsewhere     Major depression, recurrent (HCC)     Mitral valve prolapse     Mixed hyperlipidemia     Mumps     Muscle weakness (generalized)     MVP (mitral valve prolapse)     Dr. Sebastián Harris in May 2019    Old myocardial infarction     Personal history of disease of digestive system     Psychophysiological insomnia     Recurrent UTI     Dr. Haritha Biggs    S/P PICC central line placement 08/05/2022    \"no longer present\" per Bisi Ruano (Nurse) at [address] [place] [address]. Sepsis ([address]     Sinusitis     Tracheostomy in place [facility])     \"No longer present\" per Bisi Ruano (Nurse) at [facility] [place].     Ulcerative esophagitis     Urinary tract infection, site not specified     Uses wheelchair     Wears glasses     Wellness examination     Dr. Larissa Goodrich seen in Jan 2020       Past Surgical  History: Past Surgical History:   Procedure Laterality Date    APPENDECTOMY      CARPAL TUNNEL RELEASE      CHOLECYSTECTOMY, LAPAROSCOPIC N/A 05/22/2020    XI LAPAROSCOPIC ROBOTIC CHOLECYSTECTOMY, PYLOROPLASTY performed by Cheryl Chan MD at [address]      COLONOSCOPY N/A 1/28/2022    COLONOSCOPY POLYPECTOMY HOT performed by Trevor Emerson MD at [place] [address]      ERCP  05/21/2020    with stent insertion, balloon dilation, sphinctereotomy    ERCP  05/21/2020    ** CASE IN OR WITY GI STAFF** ERCP STENT INSERTION performed by Trevor Emerson MD at [facility]    ERCP  05/21/2020    ** CASE IN OR WITH GI STAFF**ERCP SPHINCTER/PAPILLOTOMY performed by Trevor Emerson MD at [facility]    ERCP  05/21/2020    ** CASE IN OR WITH GI STAFF**ERCP DILATION BALLOON performed by Trevor Emerson MD at [facility]    ERCP N/A 2/8/2021    ERCP STENT REMOVAL performed by Ishaan Blanco MD at [facility]    ERCP  2/8/2021    ERCP STONE REMOVAL performed by Ishaan Blanco MD at [address] [place] [address]    GASTRIC FUNDOPLICATION N/A 81/25/3652    XI LAPAROSCOPIC ROBOTIC [address], CHERYL FUNDOPLICATION performed by Cheryl Chan MD at [address] 03/27/2020    EGD PEG TUBE PLACEMENT performed by Cheryl Chan MD at [address] N/A 2/8/2021    **CASE IN O.R. W/ GI STAFF - EGD WITH REMOVAL PEG TUBE performed by Ishaan Blanco MD at [facility]    HAND SURGERY      [facility]. CATH POWER PICC TRIPLE  03/04/2020         HERNIA REPAIR      Hiatal hernia    HYSTERECTOMY (CERVIX STATUS UNKNOWN)      Abdominal    IR NONTUNNELED VASCULAR CATHETER  5/10/2022    IR NONTUNNELED VASCULAR CATHETER 5/10/2022 Ivis Antonio MD [facility]    JOINT REPLACEMENT Right     right hip    OVARY REMOVAL      RHINOPLASTY      TONSILLECTOMY      TOTAL HIP ARTHROPLASTY      TOTAL NEPHRECTOMY      atrophic from infections, age 13 TRACHEOSTOMY N/A 03/27/2020    **ADD ON **WANTS 10:00AM **TRACHEOTOMY performed by Edson Mendoza MD at [address] [facility] N/A 04/02/2020    TRACHEOTOMY EXCHANGE performed by Edson Mendoza MD at [address] [facility] 03/17/2020    BEDSIDE EGD ESOPHAGOGASTRODUODENOSCOPY (ICU) performed by Edson Mendoza MD at [place] [address] N/A 05/18/2020    EGD BIOPSY performed by Jett Charlton MD at [place] [address]  05/18/2020    EGD DILATION BALLOON performed by Jett Charlton MD at [place] [address] N/A 07/06/2020    ** CASE IN O.R. WITH GI STAFF** EGD ESOPHAGOGASTRODUODENOSCOPY performed by Micky Rodriguez MD at [place] [address] 11/09/2020    EGD DIAGNOSTIC ONLY performed by Matilde Gibson MD at [place] [address]  02/08/2021    - EGD WITH REMOVAL PEG TUBE,ERCP STENT REMOVAL,ERCP STONE REMOVAL    UPPER GASTROINTESTINAL ENDOSCOPY N/A 11/24/2021    EGD BIOPSY performed by Matilde Gibson MD at [place] [address] N/A 9/8/2022    EGD BIOPSY performed by Jett Charlton MD at [address] [place] [address] N/A 10/11/2022    EGD BIOPSY performed by Jett Charlton MD at [address] [place] [address]       Medications:      amitriptyline  25 mg Oral TID    busPIRone  10 mg Oral BID    escitalopram  20 mg Oral Daily    gabapentin  300 mg Oral TID    metoprolol succinate  25 mg Oral Daily    QUEtiapine  50 mg Oral Daily    QUEtiapine  100 mg Oral Nightly    traZODone  100 mg Oral Nightly    atorvastatin  40 mg Oral Daily    aspirin  81 mg Oral Daily    guaiFENesin  600 mg Oral BID    clonazePAM  0.5 mg Oral Q8H    pantoprazole (PROTONIX) 40 mg injection  40 mg IntraVENous Q12H    sodium chloride flush  5-40 mL IntraVENous 2 times per day    enoxaparin  40 mg SubCUTAneous Daily    [Held by provider] dexamethasone  6 mg IntraVENous Q24H       Social History:     Social History     Socioeconomic History    Marital status:      Spouse name: Not on file    Number of children: 2    Years of education: Not on file    Highest education level: Not on file   Occupational History    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   Tobacco Use    Smoking status: Former     Packs/day: 1.00     Years: 50.00     Pack years: 50.00     Types: Cigarettes    Smokeless tobacco: Never    Tobacco comments:     quit 1 year ago    Vaping Use    Vaping Use: Former    Substances: Always   Substance and Sexual Activity    Alcohol use: No    Drug use: No    Sexual activity: Never   Other Topics Concern    Not on file   Social History Narrative    Not on file     Social Determinants of Health     Financial Resource Strain: Not on file   Food Insecurity: Not on file   Transportation Needs: Not on file   Physical Activity: Not on file   Stress: Not on file   Social Connections: Not on file   Intimate Partner Violence: Not on file   Housing Stability: Not on file       Family History:     Family History   Problem Relation Age of Onset    Cancer Mother         uterine    Heart Attack Mother     Heart Attack Father     Other Maternal Grandfather         foot gangrene    Other Paternal Grandmother         bleeding ulcers    Other Paternal Grandfather         lung cancer        Allergies:   Prednisone, Compazine [prochlorperazine maleate], Penicillin v potassium, and Penicillins     Review of Systems:       Constitutional: No fevers or chills. No systemic complaints  Head: No headaches  Eyes: No double vision or blurry vision. No conjunctival inflammation. ENT: No sore throat or runny nose. . No hearing loss, tinnitus or vertigo. Cardiovascular: No chest pain or palpitations. Shortness of breath. HOLLAND  Lung: Shortness of breath with cough. sputum production  Abdomen: No nausea, vomiting, diarrhea, or abdominal pain. Les Pimple No cramps.   Genitourinary: No increased urinary frequency, or dysuria. No hematuria. No suprapubic or CVA pain  Musculoskeletal: generalized soreness of left side of body s/p fall  Hematologic: No bleeding or bruising. Neurologic: No headache, weakness, numbness, or tingling. Integument: No rash, no ulcers. Psychiatric: anxiety. Endocrine: No polyuria, no polydipsia, no polyphagia. Physical Examination :   Patient Vitals for the past 8 hrs:   BP Pulse Resp SpO2   01/02/23 1121 (!) 134/90 (!) 107 16 95 %   01/02/23 1025 (!) 144/84 (!) 109 -- 100 %   01/02/23 1017 -- (!) 116 -- --   01/02/23 1011 (!) 144/84 (!) 105 -- (!) 86 %   01/02/23 0951 -- -- -- 91 %   01/02/23 0925 (!) 143/82 (!) 107 18 99 %   01/02/23 0831 135/78 (!) 107 -- --   01/02/23 0753 (!) 121/95 (!) 105 18 96 %     General Appearance: Awake, alert, and anxious  Head:  Normocephalic, no trauma  Eyes: Pupils equal, round, reactive to light; sclera anicteric; conjunctivae pink. No embolic phenomena. ENT: Oropharynx clear, without erythema, exudate, or thrush. No tenderness of sinuses. Mouth/throat: mucosa pink and moist. No lesions. Dentition in good repair. Neck:Supple, without lymphadenopathy. Thyroid normal, No bruits. Pulmonary/Chest: Clear to auscultation, without wheezes, rales, or rhonchi. No dullness to percussion. Cardiovascular: Regular rate and rhythm without murmurs, rubs, or gallops. Abdomen: Soft, non tender. Bowel sounds normal. No organomegaly  All four Extremities: No cyanosis, clubbing, edema, or effusions. wheelchair bound  Neurologic: No gross sensory or motor deficits. Skin: Warm and dry with good turgor. No signs of peripheral arterial or venous insufficiency. No ulcerations. No open wounds.     Medical Decision Making -Laboratory:   I have independently reviewed/ordered the following labs:    CBC with Differential:   Recent Labs     01/01/23  1702 01/02/23  0559   WBC 7.7 6.5   HGB 14.9 15.1   HCT 46.8 47.7*   * See Reflexed IPF Result LYMPHOPCT 9* 11*   MONOPCT 13* 3     BMP:   Recent Labs     01/01/23  1702 01/02/23  0559    132*   K 4.6 4.0    94*   CO2 21 26   BUN 21 22   CREATININE 1.09* 0.98*   MG 1.8  --      Hepatic Function Panel:   Recent Labs     01/01/23  1702 01/02/23  0559   PROT 7.0 7.2   LABALBU 3.7 3.9   BILITOT 0.3 0.3   ALKPHOS 115* 109*   ALT 21 19   AST 22 19     No results for input(s): RPR in the last 72 hours. No results for input(s): HIV in the last 72 hours. No results for input(s): BC in the last 72 hours. Lab Results   Component Value Date/Time    MUCUS NOT REPORTED 12/18/2021 12:26 PM    RBC 5.09 01/02/2023 05:59 AM    TRICHOMONAS NOT REPORTED 12/18/2021 12:26 PM    WBC 6.5 01/02/2023 05:59 AM    YEAST NOT REPORTED 12/18/2021 12:26 PM    TURBIDITY Clear 01/02/2023 12:09 AM     Lab Results   Component Value Date/Time    CREATININE 0.98 01/02/2023 05:59 AM    GLUCOSE 157 01/02/2023 05:59 AM       Medical Decision Making-Imaging:     Narrative   EXAMINATION:   ONE XRAY VIEW OF THE CHEST       1/2/2023 8:16 am       COMPARISON:   January 1, 2023       HISTORY:   ORDERING SYSTEM PROVIDED HISTORY: AECOPD   TECHNOLOGIST PROVIDED HISTORY:   AECOPD   Reason for Exam: port upright       FINDINGS:   Stable cardiomediastinal silhouette. No significant pulmonary edema. No   convincing lung consolidation or infiltrate. No sizable effusion. No   pneumothorax. Impression   No acute cardiopulmonary process     Narrative   EXAMINATION:   CT OF THE ABDOMEN AND PELVIS WITHOUT CONTRAST 1/1/2023 3:21 pm       TECHNIQUE:   CT of the abdomen and pelvis was performed without the administration of   intravenous contrast. Multiplanar reformatted images are provided for review. Automated exposure control, iterative reconstruction, and/or weight based   adjustment of the mA/kV was utilized to reduce the radiation dose to as low   as reasonably achievable.        COMPARISON:   08/17/2022       HISTORY:   ORDERING SYSTEM PROVIDED HISTORY: abdominal pain   TECHNOLOGIST PROVIDED HISTORY:   abdominal pain       Decision Support Exception - unselect if not a suspected or confirmed   emergency medical condition->Emergency Medical Condition (MA)       FINDINGS:   Lower Chest: Mild atelectasis or developing infiltrate in the right base. Small bilateral pleural effusions. Organs: Liver is mildly enlarged in size with decreased density. No focal   masses identified. No evidence of intrahepatic ductal dilatation. Spleen   is mildly enlarged. The gallbladder is surgically absent. Both adrenal   glands are normal.  Pancreas is normal in appearance. Status post right   nephrectomy. Stable cyst in the left kidney. .  The left kidney is otherwise   normal in size and attenuation without evidence of hydronephrosis or renal   calculi. GI/Bowel: The visualized bowel and mesentery show no mass lesions. No   evidence of acute appendicitis. Pelvis: Status post hysterectomy. Bladder and rectum are intact. Peritoneum/Retroperitoneum: No free fluid. No lymphadenopathy. No evidence of   pneumoperitoneum. Bones/Soft Tissues: Right hip prosthesis. .  The abdominal and pelvic walls   are unremarkable. Degenerative changes seen in the visualized spine. Scoliosis. No acute bony abnormalities. Vascular calcifications are seen   compatible with atherosclerotic disease. Impression   Mild atelectasis or developing infiltrate in the right base. Small bilateral   pleural effusions.        Mild hepatosplenomegaly and fatty infiltration of the liver       Status post right nephrectomy, cholecystectomy and hysterectomy       Large hiatal hernia     Narrative   EXAMINATION:   CT OF THE HEAD WITHOUT CONTRAST  1/1/2023 2:11 pm       TECHNIQUE:   CT of the head was performed without the administration of intravenous   contrast. Automated exposure control, iterative reconstruction, and/or weight   based adjustment of the mA/kV was utilized to reduce the radiation dose to as   low as reasonably achievable. COMPARISON:   None. HISTORY:   ORDERING SYSTEM PROVIDED HISTORY: fall   TECHNOLOGIST PROVIDED HISTORY:       fall   Decision Support Exception - unselect if not a suspected or confirmed   emergency medical condition->Emergency Medical Condition (MA)       CT BRAIN FINDINGS:   BRAIN/VENTRICLES:       The cerebral hemispheres, brainstem, and cerebellum have a normal appearance   for the patient's age. The falx is midline. The ventricles and peripheral   sulci are mildly dilated. There is no sign of a space occupying lesion,   infarction, or hemorrhage. Orbits: Portion of the orbits demonstrate no acute abnormality. SINUSES: Mucoperiosteal thickening of the ethmoid sinuses. .  The remaining   imaged portions of the paranasal sinuses are clear. The mastoids and the   middle ear chambers are clear. SOFT TISSUES/SKULL:  No acute abnormality of the visualized skull or soft   tissues. Vascular calcifications are seen compatible with atherosclerotic   disease. Impression   Mild central and cortical cerebral atrophy. No acute intracranial abnormalities are noted. Medical Decision [id number]:       Medical Decision Making-Other:     Note:  Labs, medications, radiologic studies were reviewed with personal review of films  Large amounts of data were reviewed  Discussed with nursing Staff, Discharge planner  Infection Control and Prevention measures reviewed  All prior entries were reviewed  Administer medications as ordered  Prognosis: Guarded  Discharge planning reviewed      Thank you for allowing us to participate in the care of this patient. Please call with questions. MARGOTH Shah - CNP    ATTESTATION:    I have discussed the case, including pertinent history and exam findings with the APRN.  I have evaluated the  History, physical findings and pictures of the patient and the key elements of the encounter have been performed by me. I have reviewed the laboratory data, other diagnostic studies and discussed them with the APRN. I have updated the medical record where necessary. I agree with the assessment, plan and orders as documented by the APRN.     Radha Aguirre MD.    Pager: [phone] - Office: [phone]

## 2023-01-02 NOTE — H&P
[facility]  Office: [address], DO, Sosa Potter, DO, Toney Phlegm, DO, Hortencia Chewrosette Blood, DO, Nusrat Vang MD, Karyna Cedillo MD, Bishnu Brooks MD, Marina Reid MD,  Carla Harper MD, Reece Acevedo MD, Tasha Hoskins, DO, Erica Vázquez MD,  Abbi Smith MD, Escobar Valdez MD, Ольга Dwyer, DO, Jamaica Pereira MD, Liberty Prater MD, Jeremie Pozo, DO, Bryant Pollock MD, Sendy Montano MD, Melita Caro MD, Radha Cho MD, Xi Castellano DO, Rudolph Rangel MD, Micky Echols MD, Samina Cummisn, CNP,  Carline Holman, CNP, Tricia Bowman, CNP, Wyatt Jeffries, CNP,  Narendra Hernandez, DNP, Avery Warren, CNP, Tamar Ac, CNP, Jay Jay Bustillos, CNP, Fay Cox, CNP, Antony Libman, CNP, Beverley Rodgers PA-C, Stacia Le, CNS, Dewayne Anderson, CNP, Dulce Maria Garcia, CNP         [facility]    HISTORY AND PHYSICAL EXAMINATION            Date:   1/1/2023  Patient name:  Juan Stokes  Date of admission:  1/1/2023  4:20 PM  MRN:   [id number]  Account:  [de-identified]  YOB: 1945  PCP:    No primary care provider on file. Room:   [facility]  Code Status:    Prior    Chief Complaint:     Chief Complaint   Patient presents with   Danni Mary Fall   \"I'm really sick\"    History Obtained From:     patient    History of Present Illness:     Juan Stokes is a 68 y.o. 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 Status post fall earlier today while attempting to transfer from bed to wheelchair to bathroom, patient fell on the ground, denies LOC. Did not hit head.   Patient does complain of flulike symptoms with worsening nausea vomiting right lower quadrant abdominal pain with shortness of breath, cough, throbbing headaches and worsening arthralgias/myalgias. Symptoms began 2 days prior to arrival with intermittent headaches, nausea sinus congestion ear fullness and cough . Cough is nonproductive . Denies pleurisy or hemoptysis. Describes dizziness /acute lightheadedness prior to falling with transferring out of bed. patient satting 86% on room air status post 5 L nasal cannula in ED. status post bladder scan with over 500 mL urine retained.       Past Medical History:     Past Medical History:   Diagnosis Date    A-fib ([facility]     Acquired absence of kidney     Adult hypertrophic pyloric stenosis     Agoraphobia with panic disorder     Agoraphobia without history of panic disorder     Allergic rhinitis     Anemia, unspecified     Anxiety     Anxiety disorder     Arthritis     Atrial fibrillation (HCC)     Back pain     Bronchitis     Caffeine use     8 coffee / day    Cardiomegaly     Carpal tunnel syndrome     Cataracts, bilateral     Centriacinar emphysema ([facility]     Chronic kidney disease, stage III (moderate) ([facility])     Chronic pain     Constipation     Constipation     COPD (chronic obstructive pulmonary disease) ([facility])     Emphysema    Cystitis with hematuria     Depression     Diaphragmatic hernia without obstruction or gangrene     Diarrhea     Difficulty walking     Dry eye syndrome of unspecified lacrimal gland     Esophageal obstruction     FOM (frequency of micturition)     Foot drop, right foot     Gastro-esophageal reflux disease with esophagitis, with bleeding     GERD (gastroesophageal reflux disease)     H/O gastric ulcer     HTN (hypertension)     Hyperlipidemia     Hypertension, essential     Klebsiella pneumoniae (k. pneumoniae) as the cause of diseases classified elsewhere     Major depression, recurrent ([facility]     Mitral valve prolapse     Mixed hyperlipidemia     Mumps     Muscle weakness (generalized)     MVP (mitral valve prolapse)     Dr. Stacey Leonardo in May 2019    Old myocardial infarction     Personal history of disease of digestive system     Psychophysiological insomnia     Recurrent UTI     Dr. Angelia Manzano S/P PICC central line placement 08/05/2022    \"no longer present\" per Mart Haddad (Nurse) at [facility] [place].  Sepsis ([address]     Sinusitis     Tracheostomy in place [facility])     \"No longer present\" per Mart Haddad (Nurse) at [facility] [place].     Ulcerative esophagitis     Urinary tract infection, site not specified     Uses wheelchair     Wears glasses     Wellness examination     Dr. Radha Ma seen in Jan 2020        Past Surgical History:     Past Surgical History:   Procedure Laterality Date    APPENDECTOMY      CARPAL TUNNEL RELEASE      CHOLECYSTECTOMY, LAPAROSCOPIC N/A 05/22/2020    XI LAPAROSCOPIC ROBOTIC CHOLECYSTECTOMY, PYLOROPLASTY performed by Helen Trevizo MD at [place] [address] COLONOSCOPY N/A 1/28/2022    COLONOSCOPY POLYPECTOMY HOT performed by Arthur Ortega MD at [address] [place] [address] ERCP  05/21/2020    with stent insertion, balloon dilation, sphinctereotomy    ERCP  05/21/2020    ** CASE IN OR WITY GI STAFF** ERCP STENT INSERTION performed by Arthur Ortega MD at [facility]    ERCP  05/21/2020    ** CASE IN OR WITH GI STAFF**ERCP SPHINCTER/PAPILLOTOMY performed by Arthur Ortega MD at [facility]    ERCP  05/21/2020    ** CASE IN OR WITH GI STAFF**ERCP DILATION BALLOON performed by Arthur Ortega MD at [facility]    ERCP N/A 2/8/2021    ERCP STENT REMOVAL performed by María Paredes MD at [facility]    ERCP  2/8/2021    ERCP STONE REMOVAL performed by María Paredes MD at [address]    GASTRIC FUNDOPLICATION N/A 62/11/6688    XI LAPAROSCOPIC ROBOTIC HIATAL HERNIA REPAIR, CHERYL FUNDOPLICATION performed by Helen Trevizo MD at [facility]    GASTROSTOMY TUBE PLACEMENT N/A 03/27/2020    EGD PEG TUBE PLACEMENT performed by Juan Purdy MD at [facility] N/A 2/8/2021    **CASE IN O.R. W/ GI STAFF - EGD WITH REMOVAL PEG TUBE performed by Sawyer Grant MD at [facility]    HAND SURGERY      [facility] CATH POWER PICC TRIPLE  03/04/2020         HERNIA REPAIR      Hiatal hernia    HYSTERECTOMY (CERVIX STATUS UNKNOWN)      Abdominal    IR NONTUNNELED VASCULAR CATHETER  5/10/2022    IR NONTUNNELED VASCULAR CATHETER 5/10/2022 Mine Levi MD [facility]    JOINT REPLACEMENT Right     right hip    OVARY REMOVAL      RHINOPLASTY      TONSILLECTOMY      TOTAL HIP ARTHROPLASTY      TOTAL NEPHRECTOMY      atrophic from infections, age 13   [de-identified] TRACHEOSTOMY N/A 03/27/2020    **ADD ON **WANTS 10:00AM **TRACHEOTOMY performed by Juan Purdy MD at [facility] 04/02/2020    TRACHEOTOMY EXCHANGE performed by Juan Purdy MD at [address] [place] [address] N/A 03/17/2020    BEDSIDE EGD ESOPHAGOGASTRODUODENOSCOPY (ICU) performed by Juan Purdy MD at [address] N/A 05/18/2020    EGD BIOPSY performed by Rebeca Rivera MD at [address] [place] [address]  05/18/2020    EGD DILATION BALLOON performed by Rebeca Rivera MD at [address] [place] [address] N/A 07/06/2020    ** CASE IN O.R. WITH GI STAFF** EGD ESOPHAGOGASTRODUODENOSCOPY performed by Christie Bowers MD at [address] [place] [address] N/A 11/09/2020    EGD DIAGNOSTIC ONLY performed by Gema Coles MD at [address] [place] [address]  02/08/2021    - EGD WITH REMOVAL PEG TUBE,ERCP STENT REMOVAL,ERCP STONE REMOVAL    UPPER GASTROINTESTINAL ENDOSCOPY N/A 11/24/2021    EGD BIOPSY performed by Gema Coles MD at [address] [place] [address] GASTROINTESTINAL ENDOSCOPY N/A 9/8/2022    EGD BIOPSY performed by Yuri Ntahan MD at [facility] N/A 10/11/2022    EGD BIOPSY [facility]uri Nathan MD at [facility]        Medications Prior to Admission:     Prior to Admission medications    Medication Sig Start Date End Date Taking? Authorizing Provider   Sodium Phosphates (FLEET) 7-19 GM/118ML Place 1 enema rectally once as needed    Historical Provider, MD   aluminum & magnesium hydroxide-simethicone (MAALOX) 200-200-20 MG/5ML SUSP suspension Take 5 mLs by mouth as needed for Indigestion    Historical Provider, MD   traZODone (DESYREL) 100 MG tablet Take 100 mg by mouth nightly    Historical Provider, MD   ondansetron (ZOFRAN) 4 MG tablet Take 4 mg by mouth every 8 hours as needed for Nausea or Vomiting    Historical Provider, MD   albuterol (PROVENTIL) (2.5 MG/3ML) 0.083% nebulizer solution Take 2.5 mg by nebulization every 6 hours as needed for Wheezing    Historical Provider, MD   oxyCODONE-acetaminophen (PERCOCET) 5-325 MG per tablet Take 1 tablet by mouth every 8 hours as needed for Pain. Historical Provider, MD   carboxymethylcellulose 1 % ophthalmic solution Place 1 drop into both eyes in the morning and 1 drop at noon and 1 drop in the evening and 1 drop before bedtime. Historical Provider, MD   magnesium hydroxide (MILK OF MAGNESIA) 400 MG/5ML suspension Take 30 mLs by mouth Give 30ml by mouth as needed for constipation no BM x3 days    Historical Provider, MD   acetaminophen (TYLENOL) 500 MG tablet Take 1,000 mg by mouth in the morning and at bedtime    Historical Provider, MD   Cholecalciferol (VITAMIN D) 50 MCG (2000 UT) CAPS capsule Take 1 capsule by mouth daily 5/10/22   Fadia Goetz DO   guaiFENesin (MUCINEX) 600 MG extended release tablet Take 400 mg by mouth in the morning. Indications: Cough. As needed for mucus.     Historical Provider, MD   Lactobacillus TABS Take 1 tablet by mouth 2 times daily Take One tablet by mouth two times daily    Historical Provider, MD   fluticasone (FLONASE) 50 MCG/ACT nasal spray 1 spray by Each Nostril route daily    Historical Provider, MD   loratadine (CLARITIN) 10 MG capsule Take 10 mg by mouth daily    Historical Provider, MD   clonazePAM (KLONOPIN) 0.5 MG tablet Take 0.5 mg by mouth 3 times daily as needed (tid). Historical Provider, MD   metoclopramide (REGLAN) 5 MG tablet Take 1 tablet by mouth 4 times daily 1/4/22   Antoine Houser MD   polyethylene glycol (GLYCOLAX) 17 g packet Take 17 g by mouth daily as needed for Constipation 12/19/21   Cynthia Godwin MD   QUEtiapine (SEROQUEL) 100 MG tablet Take 1 tablet by mouth nightly for 3 days 12/19/21 9/8/22  Cynthia Godwin MD   QUEtiapine (SEROQUEL) 25 MG tablet Take 1 tablet by mouth every morning (before breakfast) for 3 days  Patient taking differently: Take 50 mg by mouth every morning (before breakfast) 12/19/21 9/8/22  Cynthia Godwin MD   escitalopram (LEXAPRO) 10 MG tablet Take 20 mg by mouth in the morning. Historical Provider, MD   Folic Acid-Cholecalciferol 1-2000 MG-UNIT TABS Take by mouth    Historical Provider, MD   busPIRone (BUSPAR) 10 MG tablet Take 1 tablet by mouth 2 times daily 11/30/21   Matt Tapia MD   pantoprazole (PROTONIX) 40 MG tablet Take 1 tablet by mouth 2 times daily 11/25/21 10/11/22  Cynthia Godwin MD   bisacodyl (DULCOLAX) 10 MG suppository Place 10 mg rectally daily as needed for Constipation PRN for constipation no BM x4 days.     Historical Provider, MD   budesonide-formoterol (SYMBICORT) 160-4.5 MCG/ACT AERO Inhale 2 puffs into the lungs 2 times daily    Historical Provider, MD   ferrous sulfate (FE TABS 325) 325 (65 Fe) MG EC tablet Take 1 tablet by mouth daily (with breakfast) 12/16/20   MARGOTH Allison - NP   metoprolol succinate (TOPROL XL) 25 MG extended release tablet Take 1 tablet by mouth daily 5/30/20   Shamar Rey MD   sucralfate (CARAFATE) 1 GM tablet Take 1 tablet by mouth 4 times daily 4/6/20   Tanner Vaughn MD   Oyster Shell 500 MG TABS Take 500 mg by mouth 2 times daily     Historical Provider, MD   simvastatin (ZOCOR) 40 MG tablet Take 40 mg by mouth nightly. Historical Provider, MD        Allergies:     Prednisone, Compazine [prochlorperazine maleate], Penicillin v potassium, and Penicillins    Social History:     Tobacco:    reports that she has quit smoking. Her smoking use included cigarettes. She has a 50.00 pack-year smoking history. She has never used smokeless tobacco.  Alcohol:      reports no history of alcohol use. Drug Use:  reports no history of drug use. Uses wheelchair for assistance, denies tobacco alcohol or illegal drugs    Family History:     Family History   Problem Relation Age of Onset    Cancer Mother         uterine    Heart Attack Mother     Heart Attack Father     Other Maternal Grandfather         foot gangrene    Other Paternal Grandmother         bleeding ulcers    Other Paternal Grandfather         lung cancer       Review of Systems:     Positive and Negative as described in HPI. Review of Systems  Patient complains of urinary urgency but inability to urinate. Hesitancy with difficulty emptying bladder that began earlier today. Denies dysuria. Denies hematuria. Does have worsening myalgias with right lower quadrant pain radiating to hip. Struggles with chronic indigestion heartburn with chronic abdominal pain with worsening nausea and abdominal symptoms over the past 2 days. + Throbbing headache, denies vertiginous symptoms. + Sinus congestion with ear fullness in the right more than left. Denies palpitations irregular heartbeat near syncope or tachycardia. Denies chest pain. Denies chemotherapy or biologic medications for any reason, no history of malignancy, denies prior stroke MI CHF DVT PE. Denies diabetes or sleep apnea. Denies any dysphagia status post remote PEG tube.   denies easy bruising or bleeding issues denies rectal bleeding or melanotic stool . Does struggle with anxiety but denies any acute issues with agoraphobia. review of systems otherwise unremarkable 12 system reviewed and otherwise negative    Physical Exam:   /76   Pulse 95   Temp 100 °F (37.8 °C) (Oral)   Resp 15   Ht 5' 2\" (1.575 m)   Wt 179 lb (81.2 kg)   SpO2 96%   BMI 32.74 kg/m²   Temp (24hrs), Av °F (37.8 °C), Min:100 °F (37.8 °C), Max:100 °F (37.8 °C)    No results for input(s): POCGLU in the last 72 hours.   No intake or output data in the 24 hours ending 23    Physical Exam  General anxious but appropriate, follows commands sitting up in bed awake and alert tearful  Eye exam pupils equally round reactive sclera nonicteric normal horizontal gaze  ENT oral pharynx with moist mucous membranes face symmetric neck supple  Cardiac regular rate and rhythm normal S1-S2 no murmurs rubs or gallops, no JVD  Lungs diminished bilaterally with shallow resting tachypnea mildly labored breathing without accessory muscle use noted no wheezing, scar from prior trach site  GI soft obese with generous palpation that localizes over pelvis no rebound or guarding bowel sounds present  Vascular chronic trophic changes with minimal dependent pedal edema in feet with intact dorsalis pedis posterior tibialis  Derm perineum exam deferred with respiratory issues but no other obvious rashes or lesions, adequate foot hygiene  Musculoskeletal some pain with range of motion of bilateral hips and knees with flexion, extension but adequate range of motion no obvious synovitis or joint effusions but exam limited secondary to pain issues  Neuro lower extremities with spasticity noted not able to raise legs against gravity, does wiggle toes with limited side to side movement bilaterally, upper extremities able to raise against gravity, reduced sensation in lower extremities  Investigations:      Laboratory Testing:  Recent Results (from the past 24 hour(s))   Culture, Blood 1    Collection Time: 01/01/23  5:00 PM    Specimen: Blood   Result Value Ref Range    Specimen Description . BLOOD     Special Requests R WRIST 4ML     Culture NO GROWTH <24 HRS    CBC with Auto Differential    Collection Time: 01/01/23  5:02 PM   Result Value Ref Range    WBC 7.7 3.5 - 11.3 k/uL    RBC 4.93 3.95 - 5.11 m/uL    Hemoglobin 14.9 11.9 - 15.1 g/dL    Hematocrit 46.8 36.3 - 47.1 %    MCV 94.9 82.6 - 102.9 fL    MCH 30.2 25.2 - 33.5 pg    MCHC 31.8 28.4 - 34.8 g/dL    RDW 14.6 (H) 11.8 - 14.4 %    Platelets 524 (L) 661 - 453 k/uL    MPV 11.1 8.1 - 13.5 fL    NRBC Automated 0.0 0.0 per 100 WBC    RBC Morphology ANISOCYTOSIS PRESENT     Seg Neutrophils 76 (H) 36 - 65 %    Lymphocytes 9 (L) 24 - 43 %    Monocytes 13 (H) 3 - 12 %    Eosinophils % 1 1 - 4 %    Basophils 0 0 - 2 %    Immature Granulocytes 1 (H) 0 %    Segs Absolute 5.87 1.50 - 8.10 k/uL    Absolute Lymph # 0.71 (L) 1.10 - 3.70 k/uL    Absolute Mono # 0.97 0.10 - 1.20 k/uL    Absolute Eos # 0.04 0.00 - 0.44 k/uL    Basophils Absolute <0.03 0.00 - 0.20 k/uL    Absolute Immature Granulocyte 0.09 0.00 - 0.30 k/uL   Lactic Acid    Collection Time: 01/01/23  5:02 PM   Result Value Ref Range    Lactic Acid, Whole Blood 1.5 0.7 - 2.1 mmol/L   Comprehensive Metabolic Panel    Collection Time: 01/01/23  5:02 PM   Result Value Ref Range    Glucose 104 (H) 70 - 99 mg/dL    BUN 21 8 - 23 mg/dL    Creatinine 1.09 (H) 0.50 - 0.90 mg/dL    Est, Glom Filt Rate 52 (L) >60 mL/min/1.73m2    Calcium 10.0 8.6 - 10.4 mg/dL    Sodium 137 135 - 144 mmol/L    Potassium 4.6 3.7 - 5.3 mmol/L    Chloride 103 98 - 107 mmol/L    CO2 21 20 - 31 mmol/L    Anion Gap 13 9 - 17 mmol/L    Alkaline Phosphatase 115 (H) 35 - 104 U/L    ALT 21 5 - 33 U/L    AST 22 <32 U/L    Total Bilirubin 0.3 0.3 - 1.2 mg/dL    Total Protein 7.0 6.4 - 8.3 g/dL    Albumin 3.7 3.5 - 5.2 g/dL    Albumin/Globulin Ratio 1.1 1.0 - 2.5 Lipase    Collection Time: 01/01/23  5:02 PM   Result Value Ref Range    Lipase 16 13 - 60 U/L   Magnesium    Collection Time: 01/01/23  5:02 PM   Result Value Ref Range    Magnesium 1.8 1.6 - 2.6 mg/dL   Culture, Blood 1    Collection Time: 01/01/23  5:05 PM    Specimen: Blood   Result Value Ref Range    Specimen Description . BLOOD     Special Requests LT FOREARM 10ML     Culture NO GROWTH <24 HRS    COVID-19, Rapid    Collection Time: 01/01/23  5:26 PM    Specimen: Nasopharyngeal Swab   Result Value Ref Range    Specimen Description . NASOPHARYNGEAL SWAB     SARS-CoV-2, Rapid DETECTED (A) Not Detected       Imaging/Diagnostics:  CT ABDOMEN PELVIS WO CONTRAST Additional Contrast? None    Result Date: 1/1/2023  Mild atelectasis or developing infiltrate in the right base. Small bilateral pleural effusions. Mild hepatosplenomegaly and fatty infiltration of the liver Status post right nephrectomy, cholecystectomy and hysterectomy Large hiatal hernia     XR KNEE LEFT (3 VIEWS)    Result Date: 1/1/2023  Mild to moderate osteoarthritis of the left knee. No acute bony abnormalities are noted of the bilateral hips, left shoulder and left knee. Total right hip arthropasty without acute hardware complication. CT HEAD WO CONTRAST    Result Date: 1/1/2023  Mild central and cortical cerebral atrophy. No acute intracranial abnormalities are noted. XR SHOULDER LEFT (MIN 2 VIEWS)    Result Date: 1/1/2023  Mild to moderate osteoarthritis of the left knee. No acute bony abnormalities are noted of the bilateral hips, left shoulder and left knee. Total right hip arthropasty without acute hardware complication. XR CHEST PORTABLE    Result Date: 1/1/2023  No acute process.      Portable chest x-ray independently reviewed by myself, no acute airspace disease, stable chronic changes    EKG independently reviewed by myself, sinus mechanism with nonspecific ST-T changes    XR HIP W PELVIS MIN 5 VWS BILATERAL    Result Date: 1/1/2023  Mild to moderate osteoarthritis of the left knee. No acute bony abnormalities are noted of the bilateral hips, left shoulder and left knee. Total right hip arthropasty without acute hardware complication. Assessment :      Hospital Problems             Last Modified POA    * (Principal) COVID-19 1/1/2023 Yes    Intractable abdominal pain 1/1/2023 Yes    Acute on chronic respiratory failure with hypoxia ([address] 1/1/2023 Yes    GERD (gastroesophageal reflux disease) 1/1/2023 Yes    Overview Signed 12/18/2021  7:10 AM by MARGOTH Ponce - SREE     Last Assessment & Plan:   Formatting of this note might be different from the original.  S/p hydrostatic balloon dilation. Considering POEM  Formatting of this note might be different from the original.  Last Assessment & Plan:   S/p hydrostatic balloon dilation. Considering POEM         Dysphagia 1/1/2023 Yes    Hiatal hernia 1/1/2023 Yes    History of repair of hiatal hernia 1/1/2023 Yes    Centrilobular emphysema ([address] 1/1/2023 Yes    H/O gastric ulcer 1/1/2023 Yes    Hyperlipidemia 1/1/2023 Yes    Essential hypertension 1/1/2023 Yes    S/P Nissen fundoplication (without gastrostomy tube) procedure 1/1/2023 Yes    Expressive aphasia 1/1/2023 Yes    Chronic pain 1/1/2023 Yes    Stage 3 chronic kidney disease ([address] 1/1/2023 Yes    Difficulty walking 1/1/2023 Yes       Plan:     Patient status inpatient in the Progressive Unit/Step down    Acute COVID-19 pneumonitis with acute hypoxic respiratory failure. Start steroids, pulmonary hygiene with flutter valve treatment, decongestants mucolytic's. Consult infectious disease. Consider pulmonary consult if worsening hypoxic respiratory failure, requires more than nasal cannula. Paroxysmal intrafibrillation continue rate control  Acute myalgias arthralgias likely associated with viral prodrome  Intractable nausea with acute on chronic abdominal pain with hiatal hernia/chronic GERD status post prior Nissen. Continue PPI, GI prophylaxis continue antiemetics and other supportive care  CKD with solitary kidney continue to monitor renal function. Status post fall with dizziness near syncope and collapse likely associated with viral prodrome  Acute urine retention discussed with patient, prefers intermittent straight catheterization, discussed with nursing. Straight cath>350ml with as needed bladder scanning  Agoraphobia with anxiety depression continue home medications  Chronic paraplegia with history of critical illness myelopathy status post prior complicated hospitalization with trach and PEG in remote past with decannulation of trach, removal of PEG without further issues, does use wheelchair for mobilization. Continue fall precautions    Prior records in chart reviewed in detail      CODE STATUS discussed with patient DNR CC arrest, okay with temporary intubation. No CPR. Patient reluctant to consider ventilator for respiratory failure with prior complicated hospitalization, agreeable to \"temporary intubation only\". Patient states she would never want a trach or PEG however again, would never want to go through that again. Patient very scared and became acutely tearful and upset. She will consider further adjustment of CODE STATUS with consideration for possible DO NOT INTUBATE. She wants to think about it overnight. She does not want a worry her family or talk to them right now about it. Discussed with nursing present      Anticipate likely discharge in 5-7 days contingent on clinical progress with concern for progressive acute hypoxic respiratory failure associate with COVID-19 despite prior vaccinations. Question concerns and treatment plan discussed with patient in detail, support offered as patient is acutely very upset and feels scared regarding worsening respiratory failure with COVID.       Consultations:   IP CONSULT TO HOSPITALIST    Patient is admitted as inpatient status because of co-morbidities listed above, severity of signs and symptoms as outlined, requirement for current medical therapies and most importantly because of direct risk to patient if care not provided in a hospital setting. Expected length of stay > 48 hours. Rylee Sarmiento MD  1/1/2023  8:48 PM    Copy sent to Dr. Ct Lawler primary care provider on file.

## 2023-01-02 NOTE — PROGRESS NOTES
[facility]  Office: [address], DO, Sosa Potter, DO, Toney Lackeygm, DO, Hortencia Chewrosette Blood, DO, Nusrat Vang MD, Karyna Cedillo MD, Bishnu Brooks MD, Marina Reid MD,  Carla Harper MD, Reece Acevedo MD, Tasha Hoskins, DO, Erica Vázquez MD,  Abbi Smith MD, Escobar Valdez MD, Ольга Dwyer, DO, Jamaica Pereira MD, Liberty Prater MD, Jeremie Pozo, DO, Bryant Pollock MD, Sendy Montano MD, Melita Caro MD, Radha Cho MD, Xi Castellano DO, Rudolph Rangel MD, Micky Echols MD, Samina Cummins, CNP,  Carline Holman, CNP, Tricia Bowman, CNP, Wyatt Jeffries, CNP,  Narendra Hernandez, [facility], Avery Warren, CNP, Tamar Ac, CNP, Jay Jay Bustillos, CNP, Fay Cox, CNP, Antony Libman, CNP, Beverley Rodgers, PA-C, Stacia Le, CNS, Dewayne Anderson, CNP, Dulce Maria Garcia, CNP         Niurka Gamble [address]    Progress Note    1/2/2023    4:02 PM    Name:   Juan Stokes  MRN:     [id number]     Acct:      [de-identified]   Room:   [address] [place] [address] Day:  1  Admit Date:  1/1/2023  4:20 PM    PCP:   No primary care provider on file. Code Status:  DNR-CCA    Subjective:     C/C:   Chief Complaint   Patient presents with    Fall      Interval History Status: worsened. Patient seen at bedside, appeared very anxious. Discussed with son on the phone. Stated home medications, discussed with infectious disease, will start Paxlovid. Defer Decadron, started on Protonix, whenever scheduling of anxiety medications. Reassured patient that we will try to attempt to bring her upstairs as soon as possible.     Brief History:     From H&P  Juan Stokes is a 68 y.o. 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 Status post fall earlier today while attempting to transfer from bed to wheelchair to bathroom, patient fell on the ground, denies LOC. Did not hit head. Patient does complain of flulike symptoms with worsening nausea vomiting right lower quadrant abdominal pain with shortness of breath, cough, throbbing headaches and worsening arthralgias/myalgias. Symptoms began 2 days prior to arrival with intermittent headaches, nausea sinus congestion ear fullness and cough . Cough is nonproductive . Denies pleurisy or hemoptysis. Describes dizziness /acute lightheadedness prior to falling with transferring out of bed. patient satting 86% on room air status post 5 L nasal cannula in ED. status post bladder scan with over 500 mL urine retained. Review of Systems:     Review of Systems   Constitutional:  Negative for chills, fatigue, fever and unexpected weight change. HENT:  Negative for congestion. Respiratory:  Positive for cough and shortness of breath. Negative for apnea, chest tightness, wheezing and stridor. Cardiovascular:  Negative for chest pain, palpitations and leg swelling. Gastrointestinal:  Positive for abdominal pain, nausea and vomiting. Negative for abdominal distention, constipation and diarrhea. Endocrine: Negative for polydipsia, polyphagia and polyuria. Genitourinary:  Negative for flank pain. Musculoskeletal:  Negative for arthralgias, back pain and neck stiffness. Neurological:  Negative for syncope, facial asymmetry, speech difficulty, weakness, numbness and headaches. Psychiatric/Behavioral:  The patient is nervous/anxious. Medications: Allergies:     Allergies   Allergen Reactions    Prednisone Anxiety    Compazine [Prochlorperazine Maleate]     Penicillin V Potassium     Penicillins Other (See Comments)     Shock- received meropenem and ceftriaxone wo issues 2020 Current Meds:   Scheduled Meds:    amitriptyline  25 mg Oral TID    busPIRone  10 mg Oral BID    escitalopram  20 mg Oral Daily    gabapentin  300 mg Oral TID    metoprolol succinate  25 mg Oral Daily    QUEtiapine  50 mg Oral Daily    QUEtiapine  100 mg Oral Nightly    traZODone  100 mg Oral Nightly    atorvastatin  40 mg Oral Daily    aspirin  81 mg Oral Daily    guaiFENesin  600 mg Oral BID    pantoprazole (PROTONIX) 40 mg injection  40 mg IntraVENous Q12H    clonazePAM  0.5 mg Oral TID    ciprofloxacin  500 mg Oral 2 times per day    sodium chloride flush  5-40 mL IntraVENous 2 times per day    enoxaparin  40 mg SubCUTAneous Daily    [Held by provider] dexamethasone  6 mg IntraVENous Q24H     Continuous Infusions:    sodium chloride       PRN Meds: benzonatate, ipratropium-albuterol, metoclopramide, sodium chloride flush, sodium chloride, ondansetron **OR** ondansetron, polyethylene glycol, albuterol, acetaminophen **OR** acetaminophen    Data:     Past Medical History:   has a past medical history of A-fib ([facility] [address] Acquired absence of kidney, Adult hypertrophic pyloric stenosis, Agoraphobia with panic disorder, Agoraphobia without history of panic disorder, Allergic rhinitis, Anemia, unspecified, Anxiety, Anxiety disorder, Arthritis, Atrial fibrillation ([address] Back pain, Bronchitis, Caffeine use, Cardiomegaly, Carpal tunnel syndrome, Cataracts, bilateral, Centriacinar emphysema ([address] Chronic kidney disease, stage III (moderate) ([facility] Chronic pain, Constipation, Constipation, COPD (chronic obstructive pulmonary disease) ([facility] [address] Cystitis with hematuria, Depression, Diaphragmatic hernia without obstruction or gangrene, Diarrhea, Difficulty walking, Dry eye syndrome of unspecified lacrimal gland, Esophageal obstruction, FOM (frequency of micturition), Foot drop, right foot, Gastro-esophageal reflux disease with esophagitis, with bleeding, GERD (gastroesophageal reflux disease), H/O gastric ulcer, HTN (hypertension), Hyperlipidemia, Hypertension, essential, Klebsiella pneumoniae (k. pneumoniae) as the cause of diseases classified elsewhere, Major depression, recurrent ([facility] [address] Mitral valve prolapse, Mixed hyperlipidemia, Mumps, Muscle weakness (generalized), MVP (mitral valve prolapse), Old myocardial infarction, Personal history of disease of digestive system, Psychophysiological insomnia, Recurrent UTI, S/P PICC central line placement, Sepsis ([facility] Sinusitis, Tracheostomy in place [facility]), Ulcerative esophagitis, Urinary tract infection, site not specified, Uses wheelchair, Wears glasses, and Wellness examination. Social History:   reports that she has quit smoking. Her smoking use included cigarettes. She has a 50.00 pack-year smoking history. She has never used smokeless tobacco. She reports that she does not drink alcohol and does not use drugs. Family History:   Family History   Problem Relation Age of Onset    Cancer Mother         uterine    Heart Attack Mother     Heart Attack Father     Other Maternal Grandfather         foot gangrene    Other Paternal Grandmother         bleeding ulcers    Other Paternal Grandfather         lung cancer       Vitals:  BP (!) 134/90   Pulse (!) 107   Temp 98.4 °F (36.9 °C) (Oral)   Resp 16   Ht 5' 2\" (1.575 m)   Wt 179 lb (81.2 kg)   SpO2 95%   BMI 32.74 kg/m²   Temp (24hrs), Av.2 °F (37.3 °C), Min:98.4 °F (36.9 °C), Max:100 °F (37.8 °C)    No results for input(s): POCGLU in the last 72 hours. I/O (24Hr):     Intake/Output Summary (Last 24 hours) at 2023 1602  Last data filed at 2023 0010  Gross per 24 hour   Intake --   Output 600 ml   Net -600 ml       Labs:  Hematology:  Recent Labs     23  1702 23  2318 23  0559   WBC 7.7  --  6.5   RBC 4.93  --  5.09   HGB 14.9  --  15.1   HCT 46.8  --  47.7*   MCV 94.9  --  93.7   MCH 30.2  --  29.7   MCHC 31.8  --  31.7   RDW 14.6*  --  14.5*   *  --  See Reflexed IPF Result   MPV 11.1  --   --    CRP  --  149.5* 151.5*   INR  --   --  1.0   DDIMER  --  0.50 0.70     Chemistry:  Recent Labs     01/01/23  1702 01/02/23  0559    132*   K 4.6 4.0    94*   CO2 21 26   GLUCOSE 104* 157*   BUN 21 22   CREATININE 1.09* 0.98*   MG 1.8  --    ANIONGAP 13 12   LABGLOM 52* 59*   CALCIUM 10.0 9.2   TROPHS  --  14   LACTACIDWB 1.5 1.5     Recent Labs     01/01/23  1702 01/01/23  2318 01/02/23  0559   PROT 7.0  --  7.2   LABALBU 3.7  --  3.9   AST 22  --  19   ALT 21  --  19   LDH  --  180  --    ALKPHOS 115*  --  109*   BILITOT 0.3  --  0.3   LIPASE 16  --   --      ABG:  Lab Results   Component Value Date/Time    POCPH 7.437 05/30/2020 05:41 AM    POCPCO2 42.3 05/30/2020 05:41 AM    POCPO2 129.9 05/30/2020 05:41 AM    POCHCO3 28.6 05/30/2020 05:41 AM    NBEA NOT REPORTED 05/30/2020 05:41 AM    PBEA 4 05/30/2020 05:41 AM    FCW8IUP 30 05/30/2020 05:41 AM    QWKF6ETI 99 05/30/2020 05:41 AM    FIO2 NOT REPORTED 11/23/2021 09:01 PM     Lab Results   Component Value Date/Time    SPECIAL LT FOREARM 10ML 01/01/2023 05:05 PM     Lab Results   Component Value Date/Time    CULTURE NO GROWTH 12 HOURS 01/01/2023 05:05 PM       Radiology:  CT ABDOMEN PELVIS WO CONTRAST Additional Contrast? None    Result Date: 1/1/2023  Mild atelectasis or developing infiltrate in the right base. Small bilateral pleural effusions. Mild hepatosplenomegaly and fatty infiltration of the liver Status post right nephrectomy, cholecystectomy and hysterectomy Large hiatal hernia     XR KNEE LEFT (3 VIEWS)    Result Date: 1/1/2023  Mild to moderate osteoarthritis of the left knee. No acute bony abnormalities are noted of the bilateral hips, left shoulder and left knee. Total right hip arthropasty without acute hardware complication. CT HEAD WO CONTRAST    Result Date: 1/1/2023  Mild central and cortical cerebral atrophy. No acute intracranial abnormalities are noted.      XR SHOULDER LEFT (MIN 2 VIEWS)    Result Date: 1/1/2023  Mild to moderate osteoarthritis of the left knee. No acute bony abnormalities are noted of the bilateral hips, left shoulder and left knee. Total right hip arthropasty without acute hardware complication. XR CHEST PORTABLE    Result Date: 1/2/2023  No acute cardiopulmonary process     XR CHEST PORTABLE    Result Date: 1/1/2023  No acute process. XR HIP W PELVIS MIN 5 VWS BILATERAL    Result Date: 1/1/2023  Mild to moderate osteoarthritis of the left knee. No acute bony abnormalities are noted of the bilateral hips, left shoulder and left knee. Total right hip arthropasty without acute hardware complication. Physical Examination:        Physical Exam  Constitutional:       General: She is not in acute distress. Appearance: She is obese. She is not ill-appearing or toxic-appearing. HENT:      Head: Normocephalic and atraumatic. Nose: No congestion. Mouth/Throat:      Mouth: Mucous membranes are moist.      Pharynx: Oropharynx is clear. Eyes:      Conjunctiva/sclera: Conjunctivae normal.      Pupils: Pupils are equal, round, and reactive to light. Cardiovascular:      Rate and Rhythm: Normal rate and regular rhythm. Heart sounds: No murmur heard. No friction rub. No gallop. Pulmonary:      Effort: No respiratory distress. Breath sounds: No stridor. No wheezing or rhonchi. Abdominal:      General: Abdomen is flat. There is no distension. Palpations: There is no mass. Tenderness: There is abdominal tenderness. Hernia: No hernia is present. Musculoskeletal:         General: No swelling, tenderness, deformity or signs of injury. Skin:     Coloration: Skin is not jaundiced or pale. Findings: No bruising or erythema. Neurological:      General: No focal deficit present. Mental Status: She is alert. Mental status is at baseline. Psychiatric:         Mood and Affect: Mood is anxious. Affect is labile and tearful.          Judgment: Judgment is impulsive. Assessment:        Hospital Problems             Last Modified POA    * (Principal) COVID-19 1/1/2023 Yes    Intractable abdominal pain 1/1/2023 Yes    Acute on chronic respiratory failure with hypoxia ([address] 1/1/2023 Yes    GERD (gastroesophageal reflux disease) 1/1/2023 Yes    Overview Signed 12/18/2021  7:10 AM by MARGOTH Jorge - SREE     Last Assessment & Plan:   Formatting of this note might be different from the original.  S/p hydrostatic balloon dilation. Considering POEM  Formatting of this note might be different from the original.  Last Assessment & Plan:   S/p hydrostatic balloon dilation. Considering POEM         Dysphagia 1/1/2023 Yes    Hiatal hernia 1/1/2023 Yes    History of repair of hiatal hernia 1/1/2023 Yes    Centrilobular emphysema ([address] 1/1/2023 Yes    H/O gastric ulcer 1/1/2023 Yes    Hyperlipidemia 1/1/2023 Yes    Essential hypertension 1/1/2023 Yes    S/P Nissen fundoplication (without gastrostomy tube) procedure 1/1/2023 Yes    Expressive aphasia 1/1/2023 Yes    Chronic pain 1/1/2023 Yes    Stage 3 chronic kidney disease ([address] 1/1/2023 Yes    Difficulty walking 1/1/2023 Yes       Plan:        COVID 19 Pneumonia -discussed with infectious disease, will start Paxlovid today, defer Decadron. No COVID on CXR. CRP elevated   Acute Respiratory Failure with Hypoxia -currently on 4 L nasal cannula, patient has no infiltrates on chest x-ray however sputum culture showing gram-positive and gram-negative. Patient was started on Cipro and vancomycin for antibiotics for rule out pneumonia,  Anxiety-patient takes Klonopin very tightly regimented for many years, requested additional dose today due to missed dose. Restarted on Lexapro, BuSpar, amitriptyline.   Reevaluate now that she is back on regimen at facility  CKD-stable  Intractable nausea and vomiting-patient recently seen by gastroenterology, diffuse gastritis, pathology is negative for malignancy and negative for Helicobacter pylori.   Continue Protonix IV until nausea improves, trend CBC  Chronic paraplegia    Catrina Saldana, DO  1/2/2023  4:02 PM

## 2023-01-02 NOTE — ED PROVIDER NOTES
[address]  ED  eMERGENCY dEPARTMENT eNCOUnter   Attending Attestation     Pt Name: eClsa Villatoro  MRN: [id number]  Jordigfmaria luisa 1945  Date of evaluation: 1/1/23       Celsa Villatoro is a 68 y.o. female who presents with Fall      History: Patient presents with fall. Patient was trying to transfer herself from her bed to her wheelchair to the bathroom however she fell on the ground. Patient had no loss of consciousness. Patient did not hit her head. Patient complaining of worsening abdominal pain, nausea vomiting. Patient has had some nausea and vomiting secondary to COVID and some chronic abdominal pain but she says her abdominal pain is worse today. Patient is complaining primarily of the abdominal pain which is worse than baseline, left knee pain, right hip pain, headache. Exam: Heart rate and rhythm are regular. Lungs are clear to auscultation bilaterally. Abdomen is soft, and diffusely tender. Patient has no pain over the right hip with logroll however she does have pain with palpitation. Patient has tenderness over the left knee. Plan for CT head and abdomen. Will get labs. We will get x-ray of the left knee and right hip. EKG shows normal sinus rhythm with rate of 94. Normal axis. No ST elevation or depression. T waves are upright. No blocks arrhythmias. Nonspecific EKG. I performed a history and physical examination of the patient and discussed management with the resident. I reviewed the residents note and agree with the documented findings and plan of care. Any areas of disagreement are noted on the chart. I was personally present for the key portions of any procedures. I have documented in the chart those procedures where I was not present during the key portions. I have personally reviewed all images and agree with the resident's interpretation. I have reviewed the emergency nurses triage note.  I agree with the chief complaint, past medical history, past surgical history, allergies, medications, social and family history as documented unless otherwise noted below. Documentation of the HPI, Physical Exam and Medical Decision Making performed by medical students or scribes is based on my personal performance of the HPI, PE and MDM. For Phys Assistant/ Nurse Practitioner cases/documentation I have had a face to face evaluation of this patient and have completed at least one if not all key elements of the E/M (history, physical exam, and MDM). Additional findings are as noted. For APC cases I have personally evaluated and examined the patient in conjunction with the APC and agree with the treatment plan and disposition of the patient as recorded by the APC.     Renee Rowell MD  Attending Emergency  Physician       Travis Sheikh MD  01/01/23 8929

## 2023-01-03 ENCOUNTER — APPOINTMENT (OUTPATIENT)
Dept: GENERAL RADIOLOGY | Age: 78
End: 2023-01-03
Payer: MEDICARE

## 2023-01-03 LAB
ANION GAP SERPL CALCULATED.3IONS-SCNC: 13 MMOL/L (ref 9–17)
BUN BLDV-MCNC: 23 MG/DL (ref 8–23)
C-REACTIVE PROTEIN: 66.6 MG/L (ref 0–5)
CALCIUM SERPL-MCNC: 8.5 MG/DL (ref 8.6–10.4)
CHLORIDE BLD-SCNC: 104 MMOL/L (ref 98–107)
CO2: 24 MMOL/L (ref 20–31)
CREAT SERPL-MCNC: 0.8 MG/DL (ref 0.5–0.9)
GFR SERPL CREATININE-BSD FRML MDRD: >60 ML/MIN/1.73M2
GLUCOSE BLD-MCNC: 100 MG/DL (ref 70–99)
GLUCOSE BLD-MCNC: 121 MG/DL (ref 65–105)
MRSA, DNA, NASAL: NEGATIVE
POTASSIUM SERPL-SCNC: 4.1 MMOL/L (ref 3.7–5.3)
SODIUM BLD-SCNC: 141 MMOL/L (ref 135–144)
SPECIMEN DESCRIPTION: NORMAL

## 2023-01-03 PROCEDURE — 97530 THERAPEUTIC ACTIVITIES: CPT

## 2023-01-03 PROCEDURE — 6370000000 HC RX 637 (ALT 250 FOR IP): Performed by: NURSE PRACTITIONER

## 2023-01-03 PROCEDURE — 2580000003 HC RX 258: Performed by: NURSE PRACTITIONER

## 2023-01-03 PROCEDURE — 97167 OT EVAL HIGH COMPLEX 60 MIN: CPT

## 2023-01-03 PROCEDURE — 6360000002 HC RX W HCPCS: Performed by: INTERNAL MEDICINE

## 2023-01-03 PROCEDURE — 2060000000 HC ICU INTERMEDIATE R&B

## 2023-01-03 PROCEDURE — 6360000002 HC RX W HCPCS: Performed by: NURSE PRACTITIONER

## 2023-01-03 PROCEDURE — 36415 COLL VENOUS BLD VENIPUNCTURE: CPT

## 2023-01-03 PROCEDURE — C9113 INJ PANTOPRAZOLE SODIUM, VIA: HCPCS | Performed by: INTERNAL MEDICINE

## 2023-01-03 PROCEDURE — 74018 RADEX ABDOMEN 1 VIEW: CPT

## 2023-01-03 PROCEDURE — 97162 PT EVAL MOD COMPLEX 30 MIN: CPT

## 2023-01-03 PROCEDURE — 2580000003 HC RX 258: Performed by: INTERNAL MEDICINE

## 2023-01-03 PROCEDURE — 6370000000 HC RX 637 (ALT 250 FOR IP): Performed by: INTERNAL MEDICINE

## 2023-01-03 PROCEDURE — 86140 C-REACTIVE PROTEIN: CPT

## 2023-01-03 PROCEDURE — A4216 STERILE WATER/SALINE, 10 ML: HCPCS | Performed by: INTERNAL MEDICINE

## 2023-01-03 PROCEDURE — 51701 INSERT BLADDER CATHETER: CPT

## 2023-01-03 PROCEDURE — 51702 INSERT TEMP BLADDER CATH: CPT

## 2023-01-03 PROCEDURE — 99232 SBSQ HOSP IP/OBS MODERATE 35: CPT | Performed by: NURSE PRACTITIONER

## 2023-01-03 PROCEDURE — 82947 ASSAY GLUCOSE BLOOD QUANT: CPT

## 2023-01-03 PROCEDURE — 99232 SBSQ HOSP IP/OBS MODERATE 35: CPT | Performed by: INTERNAL MEDICINE

## 2023-01-03 PROCEDURE — 80048 BASIC METABOLIC PNL TOTAL CA: CPT

## 2023-01-03 PROCEDURE — 51798 US URINE CAPACITY MEASURE: CPT

## 2023-01-03 RX ORDER — BISACODYL 10 MG
10 SUPPOSITORY, RECTAL RECTAL DAILY PRN
Status: DISCONTINUED | OUTPATIENT
Start: 2023-01-03 | End: 2023-01-06 | Stop reason: HOSPADM

## 2023-01-03 RX ORDER — SODIUM PHOSPHATE, DIBASIC AND SODIUM PHOSPHATE, MONOBASIC 7; 19 G/133ML; G/133ML
1 ENEMA RECTAL
Status: COMPLETED | OUTPATIENT
Start: 2023-01-03 | End: 2023-01-03

## 2023-01-03 RX ORDER — ENOXAPARIN SODIUM 100 MG/ML
30 INJECTION SUBCUTANEOUS 2 TIMES DAILY
Status: DISCONTINUED | OUTPATIENT
Start: 2023-01-03 | End: 2023-01-06 | Stop reason: HOSPADM

## 2023-01-03 RX ORDER — TAMSULOSIN HYDROCHLORIDE 0.4 MG/1
0.4 CAPSULE ORAL DAILY
Status: DISCONTINUED | OUTPATIENT
Start: 2023-01-03 | End: 2023-01-06 | Stop reason: HOSPADM

## 2023-01-03 RX ADMIN — AMITRIPTYLINE HYDROCHLORIDE 25 MG: 25 TABLET, FILM COATED ORAL at 15:54

## 2023-01-03 RX ADMIN — CLONAZEPAM 0.5 MG: 1 TABLET ORAL at 08:57

## 2023-01-03 RX ADMIN — REMDESIVIR 100 MG: 100 INJECTION, POWDER, LYOPHILIZED, FOR SOLUTION INTRAVENOUS at 21:28

## 2023-01-03 RX ADMIN — AMITRIPTYLINE HYDROCHLORIDE 25 MG: 25 TABLET, FILM COATED ORAL at 08:59

## 2023-01-03 RX ADMIN — METOPROLOL SUCCINATE 25 MG: 25 TABLET, EXTENDED RELEASE ORAL at 08:59

## 2023-01-03 RX ADMIN — CLONAZEPAM 0.5 MG: 1 TABLET ORAL at 22:55

## 2023-01-03 RX ADMIN — TAMSULOSIN HYDROCHLORIDE 0.4 MG: 0.4 CAPSULE ORAL at 18:25

## 2023-01-03 RX ADMIN — ENOXAPARIN SODIUM 30 MG: 100 INJECTION SUBCUTANEOUS at 21:05

## 2023-01-03 RX ADMIN — GUAIFENESIN 600 MG: 600 TABLET, EXTENDED RELEASE ORAL at 08:58

## 2023-01-03 RX ADMIN — SODIUM CHLORIDE, PRESERVATIVE FREE 40 MG: 5 INJECTION INTRAVENOUS at 21:05

## 2023-01-03 RX ADMIN — SODIUM CHLORIDE: 9 INJECTION, SOLUTION INTRAVENOUS at 21:26

## 2023-01-03 RX ADMIN — GABAPENTIN 300 MG: 300 CAPSULE ORAL at 15:54

## 2023-01-03 RX ADMIN — QUETIAPINE FUMARATE 100 MG: 100 TABLET ORAL at 21:03

## 2023-01-03 RX ADMIN — GABAPENTIN 300 MG: 300 CAPSULE ORAL at 08:58

## 2023-01-03 RX ADMIN — BUSPIRONE HYDROCHLORIDE 10 MG: 10 TABLET ORAL at 08:59

## 2023-01-03 RX ADMIN — ACETAMINOPHEN 650 MG: 325 TABLET ORAL at 15:55

## 2023-01-03 RX ADMIN — AMITRIPTYLINE HYDROCHLORIDE 25 MG: 25 TABLET, FILM COATED ORAL at 21:03

## 2023-01-03 RX ADMIN — GABAPENTIN 300 MG: 300 CAPSULE ORAL at 21:03

## 2023-01-03 RX ADMIN — TRAZODONE HYDROCHLORIDE 100 MG: 100 TABLET ORAL at 21:03

## 2023-01-03 RX ADMIN — SODIUM CHLORIDE, PRESERVATIVE FREE 5 ML: 5 INJECTION INTRAVENOUS at 09:00

## 2023-01-03 RX ADMIN — QUETIAPINE FUMARATE 50 MG: 100 TABLET ORAL at 08:59

## 2023-01-03 RX ADMIN — CLONAZEPAM 0.5 MG: 1 TABLET ORAL at 15:54

## 2023-01-03 RX ADMIN — Medication 81 MG: at 08:58

## 2023-01-03 RX ADMIN — ATORVASTATIN CALCIUM 40 MG: 80 TABLET, FILM COATED ORAL at 21:02

## 2023-01-03 RX ADMIN — ONDANSETRON 4 MG: 2 INJECTION INTRAMUSCULAR; INTRAVENOUS at 16:09

## 2023-01-03 RX ADMIN — GUAIFENESIN 600 MG: 600 TABLET, EXTENDED RELEASE ORAL at 21:04

## 2023-01-03 RX ADMIN — SODIUM PHOSPHATE, DIBASIC AND SODIUM PHOSPHATE, MONOBASIC 1 ENEMA: 7; 19 ENEMA RECTAL at 21:06

## 2023-01-03 RX ADMIN — BISACODYL 10 MG: 10 SUPPOSITORY RECTAL at 15:54

## 2023-01-03 RX ADMIN — SODIUM CHLORIDE, PRESERVATIVE FREE 10 ML: 5 INJECTION INTRAVENOUS at 21:24

## 2023-01-03 RX ADMIN — OXYCODONE HYDROCHLORIDE AND ACETAMINOPHEN 1 TABLET: 5; 325 TABLET ORAL at 13:22

## 2023-01-03 RX ADMIN — ENOXAPARIN SODIUM 40 MG: 100 INJECTION SUBCUTANEOUS at 08:57

## 2023-01-03 RX ADMIN — SODIUM CHLORIDE, PRESERVATIVE FREE 40 MG: 5 INJECTION INTRAVENOUS at 08:58

## 2023-01-03 RX ADMIN — BUSPIRONE HYDROCHLORIDE 10 MG: 10 TABLET ORAL at 21:03

## 2023-01-03 RX ADMIN — ESCITALOPRAM OXALATE 20 MG: 10 TABLET ORAL at 08:59

## 2023-01-03 ASSESSMENT — PAIN DESCRIPTION - ORIENTATION: ORIENTATION: MID

## 2023-01-03 ASSESSMENT — PAIN SCALES - GENERAL
PAINLEVEL_OUTOF10: 8
PAINLEVEL_OUTOF10: 5

## 2023-01-03 ASSESSMENT — PAIN - FUNCTIONAL ASSESSMENT: PAIN_FUNCTIONAL_ASSESSMENT: ACTIVITIES ARE NOT PREVENTED

## 2023-01-03 ASSESSMENT — PAIN DESCRIPTION - DESCRIPTORS: DESCRIPTORS: ACHING

## 2023-01-03 ASSESSMENT — PAIN DESCRIPTION - LOCATION
LOCATION: ABDOMEN
LOCATION: ABDOMEN

## 2023-01-03 NOTE — PROGRESS NOTES
Patient continues to complain of constipation and abd pain. Patient crying. Resident paged thru perfect serve.

## 2023-01-03 NOTE — PROGRESS NOTES
Patient complains that she is unable to void. Bladder scan over 900ml.   Rn will straight cath patient

## 2023-01-03 NOTE — ED NOTES
Bladder scan-150mL. Approx 25mL of urine noted in suction cannister from [address]. Pt declines straight cath at this time.       Laureen Boateng RN  01/02/23 0084
Dr. Mira Elliott at bedside     Freddy Hawthorne RN  01/02/23 7868
Patient placed on bedpan with help from zoe Kim  Patient placed on female external catheter and now has a brief on  Patient was asked if she wanted her breathing treatment, patient decline, RT notified     Fred Cerna RN  01/02/23 1200       Fred Cerna RN  01/02/23 1211    
Patient provided with dinner trarichie Segura RN  01/02/23 3061
Patient resting comfortably in bed watching tv  Denies any needs at this time     Lulu Elizondo RN  01/02/23 6791
Patient sating 90 % on 5L NC     Kelly Anthony [place]  01/01/23 5738
Pt has vomited coffee ground emesis again. She reports anxiety and appears anxious. Writer has spoken to Dr. Juan Francisco Lemus about klonopin, and order has been received. Med given as documented. Pt seems more relaxed at this time, but is worried about vomiting blood. Writer has messaged Dr. Juan Francisco Lemus about protonix. He will address at rounding with pt. She remains alert and oriented. Vitals stable at this time, while pt is tachycardic.       Nela Hartmann RN  01/02/23 9595
Pt has vomited coffee ground emesis. She states that she is still feeling nauseated. Will medicate and notify provider.       Arlene Preciado RN  01/02/23 9628
Pt moved into hospital bed with assist x 3. Pt tolerated well. Pt requesting something for indigestion. Awaiting further orders.       Addison Sever, RN  01/02/23 8485
Pt states that she's very anxious and wants her klonopin and percocet. Pt states that she'll take her morning meds. They are given, and lights are turned down. Pt encouraged to shut her eyes and try to get rest.  She states that she's waiting for her son to call, but that she hasn't slept all night.       Pato Mccord RN  01/02/23 6628
Pt with episode of vomiting while in CT scanner. Medication given as ordered.       Stewart Anaya RN  01/01/23 1932
RT notified of pts SpO2-89% on 5lpm via NC. Dr. Adalberto Alfred at bedside to evaluate pt. Pt requesting night time medications including: Percocet, Seroquel and Clonipine. Dr. Adalberto Alfred aware.       Singh Snyder RN  01/01/23 5641
Report given to [facility] RN. All questions answered.      Cheli Hernandez RN  01/02/23 6927
Report received from [facility]. All questions answered.   Patient is resting comfortably in bed  Provided patient with ice chips     [place] Letters, RN  01/02/23 2370
Report to JUAREZ Mendoza. Pt resting quietly on cart, RR even and unlabored.        David Gutierrez RN  01/02/23 5703
Respiratory called to bedside     Leonardo Clark RN  01/01/23 1915
Writer speaks with pt son, Benito Randolph. He is informed on pt care, and he is upset that pt has not received klonopin. He has spoken with pt on the phone, and is aware of her anxiety. He is also aware of boarding status. His number is [phone].      Deedee Pino RN  01/02/23 0773
[phone]

## 2023-01-03 NOTE — PROGRESS NOTES
Occupational Therapy  Facility/Department: [facility]  Occupational Therapy Initial Assessment    Name: Lavell Astudillo  : 1945  MRN: [id number]  Date of Service: 1/3/2023    Chief Complaint   Patient presents with    Jennifer Bang is a 68 y.o. 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 Status post fall earlier today while attempting to transfer from bed to wheelchair to bathroom, patient fell on the ground, denies LOC. Did not hit head. Patient does complain of flulike symptoms with worsening nausea vomiting right lower quadrant abdominal pain with shortness of breath, cough, throbbing headaches and worsening arthralgias/myalgias. Symptoms began prior to arrival with intermittent headaches, nausea sinus congestion ear fullness and cough . Cough is nonproductive . Denies pleurisy or hemoptysis. Describes dizziness /acute lightheadedness prior to falling with transferring out of bed. Discharge Recommendations:  Patient would benefit from continued therapy after discharge  OT Equipment Recommendations  Equipment Needed: No       Patient Diagnosis(es): The encounter diagnosis was COVID.   Past Medical History:  has a past medical history of A-fib ([address] Acquired absence of kidney, Adult hypertrophic pyloric stenosis, Agoraphobia with panic disorder, Agoraphobia without history of panic disorder, Allergic rhinitis, Anemia, unspecified, Anxiety, Anxiety disorder, Arthritis, Atrial fibrillation (HCC), Back pain, Bronchitis, Caffeine use, Cardiomegaly, Carpal tunnel syndrome, Cataracts, bilateral, Centriacinar emphysema ([address] Chronic kidney disease, stage III (moderate) ([address] Chronic pain, Constipation, Constipation, COPD (chronic obstructive pulmonary disease) ([facility] Cystitis with hematuria, Depression, Diaphragmatic hernia without obstruction or gangrene, Diarrhea, Difficulty walking, Dry eye syndrome of unspecified lacrimal gland, Esophageal obstruction, FOM (frequency of micturition), Foot drop, right foot, Gastro-esophageal reflux disease with esophagitis, with bleeding, GERD (gastroesophageal reflux disease), H/O gastric ulcer, HTN (hypertension), Hyperlipidemia, Hypertension, essential, Klebsiella pneumoniae (k. pneumoniae) as the cause of diseases classified elsewhere, Major depression, recurrent ([address] Mitral valve prolapse, Mixed hyperlipidemia, Mumps, Muscle weakness (generalized), MVP (mitral valve prolapse), Old myocardial infarction, Personal history of disease of digestive system, Psychophysiological insomnia, Recurrent UTI, S/P PICC central line placement, Sepsis ([facility] Sinusitis, Tracheostomy in place [facility]), Ulcerative esophagitis, Urinary tract infection, site not specified, Uses wheelchair, Wears glasses, and Wellness examination. Past Surgical History:  has a past surgical history that includes Hysterectomy; total nephrectomy; Tonsillectomy; Appendectomy; Cystoscopy; Ovary removal; Tubal ligation; rhinoplasty; Carpal tunnel release; Hand surgery; Total hip arthroplasty; eye surgery; Colonoscopy; joint replacement (Right); Gastric fundoplication (N/A, 04/49/8119); hc cath power picc triple (03/04/2020); Upper gastrointestinal endoscopy (N/A, 03/17/2020); tracheostomy (N/A, 03/27/2020); Gastrostomy tube placement (N/A, 03/27/2020); tracheostomy (N/A, 04/02/2020); Upper gastrointestinal endoscopy (N/A, 05/18/2020); Upper gastrointestinal endoscopy (05/18/2020); ERCP (05/21/2020); ERCP (05/21/2020); ERCP (05/21/2020); ERCP (05/21/2020); Cholecystectomy, laparoscopic (N/A, 05/22/2020); Upper gastrointestinal endoscopy (N/A, 07/06/2020);  Upper gastrointestinal endoscopy (N/A, 11/09/2020); Upper gastrointestinal endoscopy (02/08/2021); Gastrostomy tube placement (N/A, 2/8/2021); ERCP (N/A, 2/8/2021); ERCP (2/8/2021); Upper gastrointestinal endoscopy (N/A, 11/24/2021); hernia repair; Colonoscopy (N/A, 1/28/2022); IR NONTUNNELED VASCULAR CATHETER > 5 YEARS (5/10/2022); Upper gastrointestinal endoscopy (N/A, 9/8/2022); and Upper gastrointestinal endoscopy (N/A, 10/11/2022). Assessment   Performance deficits / Impairments: Decreased functional mobility ; Decreased ADL status; Decreased endurance;Decreased high-level IADLs;Decreased balance;Decreased posture  Assessment: Pt limited in IND/saety in ADL's at this time d/t identified performance deficits. Pt requires Min A sup<>sit, stand, and to take steps at bedside w/RW. Pt reporting feeling nauseous and dizzy throughout session. /67 and SpO2 97% when assessed in session. Pt would benefit from continued therapy to increase safety/IND in self care  Prognosis: Fair  Decision Making: High Complexity  REQUIRES OT FOLLOW-UP: Yes  Activity Tolerance  Activity Tolerance: Patient Tolerated treatment well        Plan   Occupational Therapy Plan  Times Per Week: 2-4x/week  Current Treatment Recommendations: Balance training, Functional mobility training, Endurance training, Safety education & training, Pain management, Patient/Caregiver education & training, Equipment evaluation, education, & procurement, Self-Care / ADL     Restrictions  Restrictions/Precautions  Restrictions/Precautions: Fall Risk, Up as Tolerated, Isolation (droplet plus, COVID +)  Required Braces or Orthoses?: No    Subjective   General  Patient assessed for rehabilitation services?: Yes  Family / Caregiver Present: No  General Comment  Comments: RN okayed for therapy. Pt agreeable to therapy evals. Pt reports 8/10 pain in chest. Pt reports she is given percocet 3x/day and requests to ask RN to administer. RN notified at end of session.  Pt able to progress with therapy     Social/Functional History  Social/Functional History  Lives With: Alone  Type of Home: Facility  Home Layout: One level  Home Access: Level entry  Bathroom Shower/Tub: Walk-in shower  Bathroom Toilet: Handicap height  Bathroom Equipment: Shower chair, Grab bars in shower, Grab bars around toilet  Home Equipment: Walker, rolling, Wheelchair-manual (pt reports she can independently transfer to w/c. Pt reports she can ambulate short distances with RW)  Receives Help From: Other (comment) (staff)  ADL Assistance: Needs assistance (from staff for LB bathing/dressing)  Homemaking Assistance: Needs assistance (staff completes)  Homemaking Responsibilities: No  Ambulation Assistance: Independent (can walk short distances w/RW to transfer)  Transfer Assistance: Independent  Active : No  Patient's  Info: family drives  Occupation: Retired  Type of Occupation: interviewer for research  Additional Comments: Pt from nursing facility. Staff assists with LB ADL's at baseline. Can self transfer to wheelchair and walk short distances w/RW at baseline       Objective     Safety Devices  Type of Devices: Call light within reach;Bed alarm in place;Left in bed;Gait belt;Nurse notified  Restraints  Restraints Initially in Place: No    Bed Mobility Training  Bed Mobility Training: Yes  Overall Level of Assistance: Minimum assistance  Supine to Sit: Minimum assistance  Sit to Supine: Minimum assistance  Scooting: Stand-by assistance    Balance  Sitting: Without support (SBA EOB)  Standing: With support (Min A w/RW)    Transfer Training  Transfer Training: Yes  Overall Level of Assistance: Minimum assistance  Sit to Stand: Minimum assistance  Stand to Sit: Minimum assistance    Gait  Overall Level of Assistance: Minimum assistance;Adaptive equipment (side steps at bedside)  Assistive Device: Walker, rolling;Gait belt     AROM: Generally decreased, functional (~45 degrees B shoulder flexion; BUE WFL  otherwise)  Strength: Generally decreased, functional (B shoulders 3/5; 4-/5 BUE grossly otherwise)  Coordination: Within functional limits  Tone: Normal  Sensation: Impaired (B feet numb)    ADL  Feeding: Independent  Grooming: Stand by assistance  UE Bathing: Minimal assistance  LE Bathing: Maximum assistance  UE Dressing: Minimal assistance  LE Dressing: Maximum assistance  LE Dressing Skilled Clinical Factors: assist to don socks EOB  Toileting: Moderate assistance  Additional Comments: Pt supine in bed at start of session. Min A sup<>sit. pt able to maintain sitting balance SBA ~10 mins. Min A to stand and take steps at bedside w/RW. Pt unable to don B socks EOB. BUE shoulder flexion AROM decreased, however BUE ROM/strength WFL otherwise     Activity Tolerance  Activity Tolerance: Patient limited by endurance; Patient limited by fatigue        Vision  Vision: Impaired  Vision Exceptions: Wears glasses for reading    Hearing  Hearing: Within functional limits    Cognition  Overall Cognitive Status: WFL  Orientation  Overall Orientation Status: Within Functional Limits    Education Given To: Patient  Education Provided: Role of Therapy;Plan of Care;Transfer Training  Education Method: Verbal  Barriers to Learning: None  Education Outcome: Verbalized understanding    AM-PAC Score        AM-[facility] Inpatient Daily Activity Raw Score: 16 (01/03/23 1438)  AM-PAC Inpatient ADL T-Scale Score : 35.96 (01/03/23 1438)  ADL Inpatient CMS 0-100% Score: 53.32 (01/03/23 1438)  ADL Inpatient CMS G-Code Modifier : CK (01/03/23 1438)    Goals  Short Term Goals  Time Frame for Short Term Goals: By discharge  Short Term Goal 1: Pt will complete functional transfer/mobility CGA w/RW  Short Term Goal 2: Pt will complete bed mobility SBA  Short Term Goal 3: Pt will Demo Good dynamic sitting balance unsupported 10+ mins for functional task  Short Term Goal 4: Pt will complete UB ADL's SBA       Therapy Time   Individual Concurrent Group Co-treatment   Time In 1032         Time Out 1053         Minutes 21      Co-eval w/PT   Timed Code Treatment Minutes: 8 Minutes       Yasmin Nolasco OTR/L

## 2023-01-03 NOTE — CARE COORDINATION
Case Management Assessment  Initial Evaluation    Date/Time of Evaluation: 1/3/2023 3:25PM  Assessment Completed by: Familia Peñaloza RN    If patient is discharged prior to next notation, then this note serves as note for discharge by case management. Patient Name: López Viveros                   YOB: 1945  Diagnosis: COVID [U07.1]  COVID-19 [U07.1]                   Date / Time: 1/1/2023  4:20 PM    Patient Admission Status: Inpatient   Readmission Risk (Low < 19, Mod (19-27), High > 27): Readmission Risk Score: 16.1    Current PCP: Babatunde Tejada, DO  PCP verified by CM? (P) Yes Christina Gomez)    Chart Reviewed: Yes      History Provided by: (P) Patient  Patient Orientation: (P) Alert and Oriented, Person, Place, Situation    Patient Cognition: (P) Alert    Hospitalization in the last 30 days (Readmission):  No    If yes, Readmission Assessment in CM Navigator will be completed. Advance Directives:      Code Status: DNR-CCA   Patient's Primary Decision Maker is: (P) Legal Next of Kin    Primary Decision Maker: Hamilton Ramirez - Child - [phone]    Discharge Planning:    Patient lives with: (P) Other (Comment) (at [facility]) Type of Home: (P) Other (Comment) (nursing home)  Primary Care Giver: (P) Other (Comment) [facility])  Patient Support Systems include: (P) Children   Current Financial resources: (P) Medicaid, Medicare  Current community resources: (P) Transportation  Current services prior to admission: (P) [address], Durable Medical Equipment            Current DME: (P) Wheelchair            Type of Home Care services:  (P) None    ADLS  Prior functional level: (P) Assistance with the following:, Bathing, Dressing, Toileting, Mobility  Current functional level: (P) Assistance with the following:, Bathing, Dressing, Toileting, Mobility    PT AM-PAC: 14 /24  OT AM-PAC: 16 /24    Family can provide assistance at DC:     Would you like Case Management to discuss the discharge plan with any other family members/significant others, and if so, who? Plans to Return to Present Housing: (P) Yes  Other Identified Issues/Barriers to RETURNING to current housing: none  Potential Assistance needed at discharge: (P) Transportation            Potential DME:    Patient expects to discharge to: (P) Long-term care  Plan for transportation at discharge:  ambulance    Financial    Payor: Fish Castillo / Plan: MEDICARE PART A AND B / Product Type: *No Product type* /     Does insurance require precert for SNF: No    Potential assistance Purchasing Medications: (P) No  Meds-to-Beds request: Yes    No Pharmacies Listed    Notes:    Factors facilitating achievement of predicted outcomes: Family support, Cooperative, and Pleasant    Barriers to discharge: may need O2 on 4L per N/C    Additional Case Management Notes: Transitional planning-talked with patient on the phone-COVID+, plan is to return to [facility]. Verified  address, emergency contact, and insurance with patient. [address] called [facility] and talked with Evangelina Plascencia. Patient can return to facility. The Plan for Transition of Care is related to the following treatment goals of COVID [U07.1]  COVID-19 [W80.0]    IF APPLICABLE: The Patient and/or patient representative Zee Fields and her family were provided with a choice of provider and agrees with the discharge plan. Freedom of choice list with basic dialogue that supports the patient's individualized plan of care/goals and shares the quality data associated with the providers was provided to: (P) Patient   Patient Representative Name:       The Patient and/or Patient Representative Agree with the Discharge Plan?  (P) Yes    Antonio Dancer, RN  Case Management Department  Ph: [phone] Fax: [phone]

## 2023-01-03 NOTE — PROGRESS NOTES
[facility] [address] [address] Patient  Today's Date and Time: 1/3/2023, 11:29 AM    Impression :     COVID 19 Confirmed Infection  Covid tests:  1/1/23: Detected  Vaccination status:  Patient received 3 doses of Garth Early in 2021. Overdue for Covid booster since 2/19/22. Current infection will count as a 4th dose of vaccine  She would still need a Bivalent booster in April 2023  Acute hypoxic respiratory distress  Elevated CRP  Fall from wheelchair  Paroxysmal Afib  HTN  GERD  COPD  Solitary left kidney  Chronic abdominal pain  Paraplegia  Wheel-chair bound  Chronic gastritis  Anxiety  Hx MDRO and ESBL  PCN allergy  DNR-CCA    Recommendations:   Antibiotic treatment:  Monitor off antibiotics   Covid Rx:    Remdesivir-Requested 1-2-23. Stop date 1-6-22. Decadron-Not indicated at this stage in her illness-1st week of illness is the viral replication stage  Actemra-Not indicated at this time  Paxlovid-ordered 1/2/22 but she has too many drug interactions  Molnupiravir would be an option, if problems with remdesivir  Monitor CRP      Medical Decision Making/Summary/Discussion:1/3/2023     Patient admitted with COVID 19 infection    Infection Control Recommendations   [place] Precautions  Contact-Hx ESBL and MDRO UTI  Airborne isolation  Droplet Isolation until 1/11/22    Antimicrobial Stewardship Recommendations     Discontinuation of therapy    Coordination of Outpatient Care:   Estimated Length of IV antimicrobials:TBD  Patient will need Midline Catheter Insertion: TBD  Patient will need PICC line Insertion: No  Patient will need: Home IV , [place],  SNF,  LTAC:TBD  Patient will need outpatient wound care:No    Chief complaint/reason for consultation:   Concern for COVID infection      History of Present Illness:   Moe Roe is a 68y.o.-year-old female who was initially admitted on 1/1/2023.  Patient seen at the request of Dr. Alexandra December HISTORY:    Patient, with a complicated medical history, presented through ER with complaints of a fall from her wheelchair when trying to get into bed. She denied hitting her head and fell on her left side. She had just tested positive for Covid at her ECF and is experiencing some shortness of breath with nausea and vomiting. The patient's vital signs are stable other than hypoxia requiring 4 L NC.   CXR showed no acute process but CT revealed developing atelectasis/infiltrate in the RLL. No other acute abnormalities were noted on imaging studies. Sputum culture is growing GPCC and GNR. IV vancomycin and cipro were initiated pending finalized culture results. The patient has a PCN allergy. Paxlovid was initiated for Covid. RADIOLOGY:  CT ABDOMEN PELVIS WO CONTRAST Additional Contrast? None   Final Result   Mild atelectasis or developing infiltrate in the right base. Small bilateral   pleural effusions. Mild hepatosplenomegaly and fatty infiltration of the liver       Status post right nephrectomy, cholecystectomy and hysterectomy       Large hiatal hernia           XR CHEST PORTABLE   Final Result   No acute process. XR SHOULDER LEFT (MIN 2 VIEWS)   Final Result   Mild to moderate osteoarthritis of the left knee. No acute bony abnormalities are noted of the bilateral hips, left shoulder   and left knee. Total right hip arthropasty without acute hardware complication. XR KNEE LEFT (3 VIEWS)   Final Result   Mild to moderate osteoarthritis of the left knee. No acute bony abnormalities are noted of the bilateral hips, left shoulder   and left knee. Total right hip arthropasty without acute hardware complication. XR HIP W PELVIS MIN 5 VWS BILATERAL   Final Result   Mild to moderate osteoarthritis of the left knee. No acute bony abnormalities are noted of the bilateral hips, left shoulder   and left knee.        Total right hip arthropasty without acute hardware complication. CT HEAD WO CONTRAST   Final Result   Mild central and cortical cerebral atrophy. No acute intracranial abnormalities are noted. Patient admitted because of concerns with COVID 19.    CURRENT EVALUATION : 1/3/2023  /67   Pulse (!) 102   Temp 98.1 °F (36.7 °C) (Oral)   Resp 20   Ht 5' 2\" (1.575 m)   Wt 179 lb (81.2 kg)   SpO2 95%   BMI 32.74 kg/m²     Afebrile  VS stable    The patient is alert and oriented. She remains on 4 L NC.    CRP is trending down nicely. She is receiving Remdesivir    She is requiring straight cath for urinary retention. Patient exhibiting respiratory distress. yes  Respiratory secretions: yes    Patient receiving supplemental oxygen: 4 L NC  RR:16-->23  02 sat:96-->96    QTc: 437    NEWS Score: 0-4 Low risk group; 5-6: Medium risk group; 7 or above: High risk group  Parameters 3 2 1 0 1 2 3   Age    < 65   ? 65   RR ? 8  9-11 12-20  21-24 ? 25   O2 Sats ?  91 92-93 94-95 ? 96      Suppl O2  Yes  No      SBP ? 90  101-110 111-219   ? 220   HR ? 40  41-50 51-90  111-130 ? 131   Consciousness    Alert   Drowsiness, lethargy, or confusion   Temperature ? 35.0 C (95.0 F)  35.1-36.0 C 95.1-96.9 F 36.1-38.0 C 97.0-100.4 F 38.1-39.0 C 100.5-102.3 F ? 39.1 C ? 102.4 F      NEWS Score:  1/2/22: 5 moderate risk for ICU    Overall Daily Picture:   Improving    Presence of secondary bacterial Infection:  No  Additional antibiotics: No    Labs, X rays reviewed: 1/3/2023    BUN:22-->23  Cr:0.98-->0.80    WBC:6.5  Hb:15.1  Plat: 127    Absolute Neutrophils:5.5  Absolute Lymphocytes:0.72  Neutrophil/Lymphocyte Ratio: 7.6 high risk for worsening viral illness    CRP:151.5-->66.6  Ferritin:171  LDH: 180    Cultures:  Urine:    Blood:  1/1/23: No growth thus far  Sputum :  1/1/23: GNR and GPCC-finalized culture pending  MRSA Nares:  1/2/23: pending    IMAGING:    CXR:   1/1/23 7/31/22      CAT:  1/1/23      Discussed with patient, RN, CC, IM. I have personally reviewed the past medical history, past surgical history, medications, social history, and family history, and I have updated the database accordingly.   Past Medical History:     Past Medical History:   Diagnosis Date    A-fib ([facility] [address]     Acquired absence of kidney     Adult hypertrophic pyloric stenosis     Agoraphobia with panic disorder     Agoraphobia without history of panic disorder     Allergic rhinitis     Anemia, unspecified     Anxiety     Anxiety disorder     Arthritis     Atrial fibrillation (HCC)     Back pain     Bronchitis     Caffeine use     8 coffee / day    Cardiomegaly     Carpal tunnel syndrome     Cataracts, bilateral     Centriacinar emphysema (HCC)     Chronic kidney disease, stage III (moderate) (HCC)     Chronic pain     Constipation     Constipation     COPD (chronic obstructive pulmonary disease) (HCC)     Emphysema    Cystitis with hematuria     Depression     Diaphragmatic hernia without obstruction or gangrene     Diarrhea     Difficulty walking     Dry eye syndrome of unspecified lacrimal gland     Esophageal obstruction     FOM (frequency of micturition)     Foot drop, right foot     Gastro-esophageal reflux disease with esophagitis, with bleeding     GERD (gastroesophageal reflux disease)     H/O gastric ulcer     HTN (hypertension)     Hyperlipidemia     Hypertension, essential     Klebsiella pneumoniae (k. pneumoniae) as the cause of diseases classified elsewhere     Major depression, recurrent (HCC)     Mitral valve prolapse     Mixed hyperlipidemia     Mumps     Muscle weakness (generalized)     MVP (mitral valve prolapse)     Dr. Roosevelt Vega in May 2019    Old myocardial infarction     Personal history of disease of digestive system     Psychophysiological insomnia     Recurrent UTI     Dr. Sotero Gonzalez    S/P PICC central line placement 08/05/2022    \"no longer present\" per Armando Hong (Nurse) at [facility] [place]. Sepsis ([address]     Sinusitis     Tracheostomy in place [facility])     \"No longer present\" per Armando Hong (Nurse) at [facility] of [place].     Ulcerative esophagitis     Urinary tract infection, site not specified     Uses wheelchair     Wears glasses     Wellness examination     Dr. Collette Hy seen in Jan 2020       Past Surgical  History:     Past Surgical History:   Procedure Laterality Date    APPENDECTOMY      CARPAL TUNNEL RELEASE      CHOLECYSTECTOMY, LAPAROSCOPIC N/A 05/22/2020    XI LAPAROSCOPIC ROBOTIC CHOLECYSTECTOMY, PYLOROPLASTY performed by Narinder Noel MD at [address] [facility]      COLONOSCOPY N/A 1/28/2022    COLONOSCOPY POLYPECTOMY HOT performed by Clint Mueller MD at [address] [facility]      ERCP  05/21/2020    with stent insertion, balloon dilation, sphinctereotomy    ERCP  05/21/2020    ** CASE IN OR WITY GI STAFF** ERCP STENT INSERTION performed by Clint Mueller MD at [facility]    ERCP  05/21/2020    ** CASE IN OR WITH GI STAFF**ERCP SPHINCTER/PAPILLOTOMY performed by Clint Mueller MD at [facility]    ERCP  05/21/2020    ** CASE IN OR WITH GI STAFF**ERCP DILATION BALLOON performed by Clint Mueller MD at [facility]    ERCP N/A 2/8/2021    ERCP STENT REMOVAL performed by Orion Farris MD at [facility]    ERCP  2/8/2021    ERCP STONE REMOVAL performed by Oroin Farris MD at [address] [facility]    GASTRIC FUNDOPLICATION N/A 82/22/8634    XI LAPAROSCOPIC ROBOTIC [address] [facility] [address], CHERYL FUNDOPLICATION performed by Narinder Noel MD at [address] 03/27/2020    EGD PEG TUBE PLACEMENT performed by Narinder Noel MD at [address] N/A 2/8/2021    **CASE IN O.R. W/ GI STAFF - EGD WITH REMOVAL PEG TUBE performed by Orion Farris MD at [facility]    [facility] SUR[facility]ERY      [facility] [place]. CATH POWER PICC TRIPLE  03/04/2020         HERNIA REPAIR      Hiatal hernia    HYSTERECTOMY (CERVIX STATUS UNKNOWN)      Abdominal    IR NONTUNNELED VASCULAR CATHETER  5/10/2022    IR NONTUNNELED VASCULAR CATHETER 5/10/2022 Erik Patel MD [facility]    JOINT REPLACEMENT Right     right hip    OVARY REMOVAL      RHINOPLASTY      TONSILLECTOMY      TOTAL HIP ARTHROPLASTY      TOTAL NEPHRECTOMY      atrophic from infections, age 13    TRACHEOSTOMY N/A 03/27/2020    **ADD ON **WANTS 10:00AM **TRACHEOTOMY performed by Ever Le MD at [facility] N/A 04/02/2020    TRACHEOTOMY EXCHANGE performed by Ever Le MD at [address] [place] [address] 03/17/2020    BEDSIDE EGD ESOPHAGOGASTRODUODENOSCOPY (ICU) performed by Ever Le MD at [facility] 05/18/2020    EGD BIOPSY performed by Asuncion Espinal MD at [facility]  05/18/2020    EGD DILATION BALLOON performed by Asuncion Espinal MD at [address] [place] [address],[facility] N/A 07/06/2020    ** CASE IN O.R. WITH GI STAFF** EGD ESOPHAGOGASTRODUODENOSCOPY performed by Birgit Gomez MD at [address] [facility] 11/09/2020    EGD DIAGNOSTIC ONLY performed by Elena Owusu MD at [address] [facility]  02/08/2021    - EGD WITH REMOVAL PEG TUBE,ERCP STENT REMOVAL,ERCP STONE REMOVAL    UPPER GASTROINTESTINAL ENDOSCOPY N/A 11/24/2021    EGD BIOPSY performed by Elena Owusu MD at [facility] 9/8/2022    EGD BIOPSY performed by Asuncion Espinal MD at [address] [place] [address] N/A 10/11/2022    EGD BIOPSY performed by Asuncion Espinal MD at [facility]       Medications:      amitriptyline  25 mg Oral TID    busPIRone  10 mg Oral BID    escitalopram  20 mg Oral Daily    gabapentin  300 mg Oral TID    metoprolol succinate  25 mg Oral Daily    QUEtiapine 50 mg Oral Daily    QUEtiapine  100 mg Oral Nightly    traZODone  100 mg Oral Nightly    atorvastatin  40 mg Oral Daily    aspirin  81 mg Oral Daily    guaiFENesin  600 mg Oral BID    pantoprazole (PROTONIX) 40 mg injection  40 mg IntraVENous Q12H    clonazePAM  0.5 mg Oral TID    remdesivir IVPB  100 mg IntraVENous Q24H    sodium chloride flush  5-40 mL IntraVENous 2 times per day    enoxaparin  40 mg SubCUTAneous Daily    [Held by provider] dexamethasone  6 mg IntraVENous Q24H       Social History:     Social History     Socioeconomic History    Marital status:       Spouse name: Not on file    Number of children: 2    Years of education: Not on file    Highest education level: Not on file   Occupational History    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   Tobacco Use    Smoking status: Former     Packs/day: 1.00     Years: 50.00     Pack years: 50.00     Types: Cigarettes    Smokeless tobacco: Never    Tobacco comments:     quit 1 year ago    Vaping Use    Vaping Use: Former    Substances: Always   Substance and Sexual Activity    Alcohol use: No    Drug use: No    Sexual activity: Never   Other Topics Concern    Not on file   Social History Narrative    Not on file     Social Determinants of Health     Financial Resource Strain: Not on file   Food Insecurity: Not on file   Transportation Needs: Not on file   Physical Activity: Not on file   Stress: Not on file   Social Connections: Not on file   Intimate Partner Violence: Not on file   Housing Stability: Not on file       Family History:     Family History   Problem Relation Age of Onset    Cancer Mother         uterine    Heart Attack Mother     Heart Attack Father     Other Maternal Grandfather         foot gangrene    Other Paternal Grandmother         bleeding ulcers    Other Paternal Grandfather         lung cancer        Allergies:   Prednisone, Compazine [prochlorperazine maleate], Penicillin v potassium, and Penicillins     Review of Systems:       Constitutional: No fevers or chills. No systemic complaints  Head: No headaches  Eyes: No double vision or blurry vision. No conjunctival inflammation. ENT: No sore throat or runny nose. . No hearing loss, tinnitus or vertigo. Cardiovascular: No chest pain or palpitations. Shortness of breath. [place]  Lung: Shortness of breath with cough. sputum production  Abdomen: No nausea, vomiting, diarrhea, or abdominal pain. Matthew Parisian No cramps. Genitourinary: No increased urinary frequency, or dysuria. No hematuria. No suprapubic or CVA pain  Musculoskeletal: generalized soreness of left side of body s/p fall  Hematologic: No bleeding or bruising. Neurologic: No headache, weakness, numbness, or tingling. Integument: No rash, no ulcers. Psychiatric: anxiety. Endocrine: No polyuria, no polydipsia, no polyphagia. Physical Examination :   Patient Vitals for the past 8 hrs:   BP Temp Temp src Pulse Resp SpO2   01/03/23 1045 127/67 -- -- (!) 102 20 95 %   01/03/23 0800 -- -- -- 90 -- --   01/03/23 0740 99/66 -- -- 91 13 96 %   01/03/23 0412 132/87 98.1 °F (36.7 °C) Oral 96 20 96 %     General Appearance: Awake, alert, and anxious  Head:  Normocephalic, no trauma  Eyes: Pupils equal, round, reactive to light; sclera anicteric; conjunctivae pink. No embolic phenomena. ENT: Oropharynx clear, without erythema, exudate, or thrush. No tenderness of sinuses. Mouth/throat: mucosa pink and moist. No lesions. Dentition in good repair. Neck:Supple, without lymphadenopathy. Thyroid normal, No bruits. Pulmonary/Chest: Clear to auscultation, without wheezes, rales, or rhonchi. No dullness to percussion. Cardiovascular: Regular rate and rhythm without murmurs, rubs, or gallops. Abdomen: Soft, non tender. Bowel sounds normal. No organomegaly  All four Extremities: No cyanosis, clubbing, edema, or effusions. wheelchair bound  Neurologic: No gross sensory or motor deficits. Skin: Warm and dry with good turgor. No signs of peripheral arterial or venous insufficiency. No ulcerations. No open wounds. Medical Decision Making -Laboratory:   I have independently reviewed/ordered the following labs:    CBC with Differential:   Recent Labs     01/01/23  1702 01/02/23  0559   WBC 7.7 6.5   HGB 14.9 15.1   HCT 46.8 47.7*   * See Reflexed IPF Result   LYMPHOPCT 9* 11*   MONOPCT 13* 3     BMP:   Recent Labs     01/01/23  1702 01/02/23  0559 01/03/23  0522    132* 141   K 4.6 4.0 4.1    94* 104   CO2 21 26 24   BUN 21 22 23   CREATININE 1.09* 0.98* 0.80   MG 1.8  --   --      Hepatic Function Panel:   Recent Labs     01/01/23  1702 01/02/23  0559   PROT 7.0 7.2   LABALBU 3.7 3.9   BILITOT 0.3 0.3   ALKPHOS 115* 109*   ALT 21 19   AST 22 19     No results for input(s): RPR in the last 72 hours. No results for input(s): HIV in the last 72 hours. No results for input(s): BC in the last 72 hours. Lab Results   Component Value Date/Time    MUCUS NOT REPORTED 12/18/2021 12:26 PM    RBC 5.09 01/02/2023 05:59 AM    TRICHOMONAS NOT REPORTED 12/18/2021 12:26 PM    WBC 6.5 01/02/2023 05:59 AM    YEAST NOT REPORTED 12/18/2021 12:26 PM    TURBIDITY Clear 01/02/2023 12:09 AM     Lab Results   Component Value Date/Time    CREATININE 0.80 01/03/2023 05:22 AM    GLUCOSE 100 01/03/2023 05:22 AM       Medical Decision Making-Imaging:     Narrative   EXAMINATION:   ONE XRAY VIEW OF THE CHEST       1/2/2023 8:16 am       COMPARISON:   January 1, 2023       HISTORY:   ORDERING SYSTEM PROVIDED HISTORY: AECOPD   TECHNOLOGIST PROVIDED HISTORY:   AECOPD   Reason for Exam: port upright       FINDINGS:   Stable cardiomediastinal silhouette. No significant pulmonary edema. No   convincing lung consolidation or infiltrate. No sizable effusion. No   pneumothorax.            Impression   No acute cardiopulmonary process     Narrative   EXAMINATION:   CT OF THE ABDOMEN AND PELVIS WITHOUT CONTRAST 1/1/2023 3:21 pm       TECHNIQUE:   CT of the abdomen and pelvis was performed without the administration of   intravenous contrast. Multiplanar reformatted images are provided for review. Automated exposure control, iterative reconstruction, and/or weight based   adjustment of the mA/kV was utilized to reduce the radiation dose to as low   as reasonably achievable. COMPARISON:   08/17/2022       HISTORY:   ORDERING SYSTEM PROVIDED HISTORY: abdominal pain   TECHNOLOGIST PROVIDED HISTORY:   abdominal pain       Decision Support Exception - unselect if not a suspected or confirmed   emergency medical condition->Emergency Medical Condition (MA)       FINDINGS:   Lower Chest: Mild atelectasis or developing infiltrate in the right base. Small bilateral pleural effusions. Organs: Liver is mildly enlarged in size with decreased density. No focal   masses identified. No evidence of intrahepatic ductal dilatation. Spleen   is mildly enlarged. The gallbladder is surgically absent. Both adrenal   glands are normal.  Pancreas is normal in appearance. Status post right   nephrectomy. Stable cyst in the left kidney. .  The left kidney is otherwise   normal in size and attenuation without evidence of hydronephrosis or renal   calculi. GI/Bowel: The visualized bowel and mesentery show no mass lesions. No   evidence of acute appendicitis. Pelvis: Status post hysterectomy. Bladder and rectum are intact. Peritoneum/Retroperitoneum: No free fluid. No lymphadenopathy. No evidence of   pneumoperitoneum. Bones/Soft Tissues: Right hip prosthesis. .  The abdominal and pelvic walls   are unremarkable. Degenerative changes seen in the visualized spine. Scoliosis. No acute bony abnormalities. Vascular calcifications are seen   compatible with atherosclerotic disease. Impression   Mild atelectasis or developing infiltrate in the right base. Small bilateral   pleural effusions.        Mild hepatosplenomegaly and fatty infiltration of the liver       Status post right nephrectomy, cholecystectomy and hysterectomy       Large hiatal hernia     Narrative   EXAMINATION:   CT OF THE HEAD WITHOUT CONTRAST  1/1/2023 2:11 pm       TECHNIQUE:   CT of the head was performed without the administration of intravenous   contrast. Automated exposure control, iterative reconstruction, and/or weight   based adjustment of the mA/kV was utilized to reduce the radiation dose to as   low as reasonably achievable. COMPARISON:   None. HISTORY:   ORDERING SYSTEM PROVIDED HISTORY: fall   TECHNOLOGIST PROVIDED HISTORY:       fall   Decision Support Exception - unselect if not a suspected or confirmed   emergency medical condition->Emergency Medical Condition (MA)       CT BRAIN FINDINGS:   BRAIN/VENTRICLES:       The cerebral hemispheres, brainstem, and cerebellum have a normal appearance   for the patient's age. The falx is midline. The ventricles and peripheral   sulci are mildly dilated. There is no sign of a space occupying lesion,   infarction, or hemorrhage. Orbits: Portion of the orbits demonstrate no acute abnormality. SINUSES: Mucoperiosteal thickening of the ethmoid sinuses. .  The remaining   imaged portions of the paranasal sinuses are clear. The mastoids and the   middle ear chambers are clear. SOFT TISSUES/SKULL:  No acute abnormality of the visualized skull or soft   tissues. Vascular calcifications are seen compatible with atherosclerotic   disease. Impression   Mild central and cortical cerebral atrophy. No acute intracranial abnormalities are noted.            Medical Decision [id number]:       Medical Decision Making-Other:     Note:  Labs, medications, radiologic studies were reviewed with personal review of films  Large amounts of data were reviewed  Discussed with nursing Staff, Discharge planner  Infection Control and Prevention measures reviewed  All prior entries were reviewed  Administer medications as ordered  Prognosis: Fair  Discharge planning reviewed      Thank you for allowing us to participate in the care of this patient. Please call with questions. Sis Chavarria, MARGOTH - CNP    ATTESTATION:    I have discussed the case, including pertinent history and exam findings with the APRN. I have evaluated the  History, physical findings and pictures of the patient and the key elements of the encounter have been performed by me. I have reviewed the laboratory data, other diagnostic studies and discussed them with the APRN. I have updated the medical record where necessary. I agree with the assessment, plan and orders as documented by the APRN.     Stacia Cooley MD.        Pager: [phone] - Office: [phone]

## 2023-01-03 NOTE — PROGRESS NOTES
Physical Therapy  Facility/Department: [facility]PDOWN  Physical Therapy Initial Assessment    Name: Moe Roe  : 1945  MRN: [id number]  Date of Service: 1/3/2023    Discharge Recommendations: Further therapy recommended at discharge. Chief Complaint   Patient presents with    Dell Mercado is a 68 y.o. 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 Status post fall earlier today while attempting to transfer from bed to wheelchair to bathroom, patient fell on the ground, denies LOC. Did not hit head. Patient does complain of flulike symptoms with worsening nausea vomiting right lower quadrant abdominal pain with shortness of breath, cough, throbbing headaches and worsening arthralgias/myalgias. Symptoms began prior to arrival with intermittent headaches, nausea sinus congestion ear fullness and cough . Cough is nonproductive . Denies pleurisy or hemoptysis. Describes dizziness /acute lightheadedness prior to falling with transferring out of bed. PT Equipment Recommendations  Equipment Needed: No      Patient Diagnosis(es): The encounter diagnosis was COVID.   Past Medical History:  has a past medical history of A-fib ([address] Acquired absence of kidney, Adult hypertrophic pyloric stenosis, Agoraphobia with panic disorder, Agoraphobia without history of panic disorder, Allergic rhinitis, Anemia, unspecified, Anxiety, Anxiety disorder, Arthritis, Atrial fibrillation (HCC), Back pain, Bronchitis, Caffeine use, Cardiomegaly, Carpal tunnel syndrome, Cataracts, bilateral, Centriacinar emphysema ([address] Chronic kidney disease, stage III (moderate) ([address] Chronic pain, Constipation, Constipation, COPD (chronic obstructive pulmonary disease) ([facility] Cystitis with hematuria, Depression, Diaphragmatic hernia without obstruction or gangrene, Diarrhea, Difficulty walking, Dry eye syndrome of unspecified lacrimal gland, Esophageal obstruction, FOM (frequency of micturition), Foot drop, right foot, Gastro-esophageal reflux disease with esophagitis, with bleeding, GERD (gastroesophageal reflux disease), H/O gastric ulcer, HTN (hypertension), Hyperlipidemia, Hypertension, essential, Klebsiella pneumoniae (k. pneumoniae) as the cause of diseases classified elsewhere, Major depression, recurrent ([address] Mitral valve prolapse, Mixed hyperlipidemia, Mumps, Muscle weakness (generalized), MVP (mitral valve prolapse), Old myocardial infarction, Personal history of disease of digestive system, Psychophysiological insomnia, Recurrent UTI, S/P PICC central line placement, Sepsis ([facility] Sinusitis, Tracheostomy in place [facility]), Ulcerative esophagitis, Urinary tract infection, site not specified, Uses wheelchair, Wears glasses, and Wellness examination. Past Surgical History:  has a past surgical history that includes Hysterectomy; total nephrectomy; Tonsillectomy; Appendectomy; Cystoscopy; Ovary removal; Tubal ligation; rhinoplasty; Carpal tunnel release; Hand surgery; Total hip arthroplasty; eye surgery; Colonoscopy; joint replacement (Right); Gastric fundoplication (N/A, 91/46/0203); hc cath power picc triple (03/04/2020); Upper gastrointestinal endoscopy (N/A, 03/17/2020); tracheostomy (N/A, 03/27/2020); Gastrostomy tube placement (N/A, 03/27/2020); tracheostomy (N/A, 04/02/2020); Upper gastrointestinal endoscopy (N/A, 05/18/2020); Upper gastrointestinal endoscopy (05/18/2020); ERCP (05/21/2020); ERCP (05/21/2020); ERCP (05/21/2020); ERCP (05/21/2020); Cholecystectomy, laparoscopic (N/A, 05/22/2020); Upper gastrointestinal endoscopy (N/A, 07/06/2020); Upper gastrointestinal endoscopy (N/A, 11/09/2020);  Upper gastrointestinal endoscopy (02/08/2021); Gastrostomy tube placement (N/A, 2/8/2021); ERCP (N/A, 2/8/2021); ERCP (2/8/2021); Upper gastrointestinal endoscopy (N/A, 11/24/2021); hernia repair; Colonoscopy (N/A, 1/28/2022); IR NONTUNNELED VASCULAR CATHETER > 5 YEARS (5/10/2022); Upper gastrointestinal endoscopy (N/A, 9/8/2022); and Upper gastrointestinal endoscopy (N/A, 10/11/2022). Assessment   Body Structures, Functions, Activity Limitations Requiring Skilled Therapeutic Intervention: Decreased functional mobility ; Decreased ADL status; Decreased body mechanics; Decreased endurance;Decreased strength;Decreased coordination;Decreased balance;Decreased high-level IADLs  Assessment: Pt ambulates a few steps at EOB with RW and min A. pt is limited this session d/t c/o of dizziness and nausea, RN notified. Pt currently is a high fall risk d/t decreased balance and endurance, requiring 24 hr assistance with functional mobility at this time. Pt would benefit from continued therapy to promote endurance, balance, and strengthening. Therapy Prognosis: Good  Decision Making: Medium Complexity  Barriers to Learning: none  Requires PT Follow-Up: Yes  Activity Tolerance  Activity Tolerance: Patient limited by endurance; Patient limited by fatigue     Plan   Physcial Therapy Plan  General Plan:  (5-6x)  Current Treatment Recommendations: Strengthening, Balance training, Functional mobility training, Transfer training, ADL/Self-care training, IADL training, Gait training, Endurance training, Neuromuscular re-education, Equipment evaluation, education, & procurement, Therapeutic activities, Home exercise program, Safety education & training, Patient/Caregiver education & training  Safety Devices  Type of Devices:  All fall risk precautions in place, Call light within reach, Bed alarm in place, Left in bed, Gait belt, Nurse notified  Restraints  Restraints Initially in Place: No Restrictions  Restrictions/Precautions  Restrictions/Precautions: Fall Risk, Up as Tolerated, Isolation (droplet plus isolation, COVID +)  Required Braces or Orthoses?: No     Subjective   General  Patient assessed for rehabilitation services?: Yes  Response To Previous Treatment: Not applicable  Family / Caregiver Present: No  Follows Commands: Within Functional Limits  Subjective  Subjective: RN and pt agreeable to PT. pt agreeable and pleasant. Pt supine in bed at start of session. Social/Functional History  Social/Functional History  Lives With: Alone  Type of Home: Facility  Home Layout: One level  Home Access: Level entry  Bathroom Shower/Tub: Walk-in shower  Bathroom Toilet: Handicap height  Bathroom Equipment: Shower chair, Grab bars in shower, Grab bars around toilet  Home Equipment: [employer], rolling, Wheelchair-manual (pt reports she can independently transfer to w/c.  Pt reports she can ambulate short distances with RW)  ADL Assistance: Needs assistance (from staff for bathing)  Homemaking Assistance: Needs assistance (staff completes)  Active : No  Patient's  Info: family drives  Occupation: Retired  Type of Occupation: interviewer for research  Vision/Hearing  Vision  Vision: Impaired  Vision Exceptions: Wears glasses for reading  Hearing  Hearing: Within functional limits    Cognition   Orientation  Overall Orientation Status: Within Functional Limits  Cognition  Overall Cognitive Status: WFL           Gross Assessment  Sensation: Impaired (B feet, which pt reports is typical for her)     AROM RLE (degrees)  RLE AROM: WFL  AROM LLE (degrees)  LLE AROM : WFL  AROM RUE (degrees)  RUE AROM : Exceptions  RUE General AROM: AROM to 50 deg for shoulder flexion, PROM shoulder flexion WFL  AROM LUE (degrees)  LUE AROM : Exceptions  LUE General AROM: AROM to 50 deg for shoulder flexion, PROM shoulder flexion [facility]  Strength RLE  Strength RLE: Exception  Comment: grossly 4+/5  Strength LLE  Strength LLE: Exception  Comment: grossly 4+/5  Strength RUE  Strength RUE: Exception  Comment: 3/5 shoulder flexion  Strength LUE  Strength LUE: Exception  Comment: 3/5 shoulder flexion           Bed mobility  Supine to Sit: Minimal assistance (for trunk progression)  Sit to Supine: Minimal assistance (for BLE progression)  Scooting: Contact guard assistance  Bed Mobility Comments: HOB elevated  Transfers  Sit to Stand: Minimal Assistance  Stand to Sit: Minimal Assistance  Comment: RW for UE support. Ambulation  Surface: Level tile  Device: Rolling Walker  Other Apparatus: O2 (4 L per NC)  Assistance: Minimal assistance  Gait Deviations: Slow Jaleesa;Decreased step length;Decreased step height  Distance: A few side steps  Comments: Pt is grossly unsteady. Pt limited this date d/t c/o dizziness in sitting, which pt reports becomes worse with time, BP in sitting 127/67 mmm Hg. Pt c/o nausea at end of session, RN notified of this and BP/dizziness. More Ambulation?: No  Stairs/Curb  Stairs?: No     Balance  Posture: Good  Sitting - Static: Good  Sitting - Dynamic: Good;-  Standing - Static: Fair;-  Standing - Dynamic: Fair;-  Comments: Assessed with RW. Sits on EOB x10 minutes with SBA.                                                         AM-PAC Score  AM-PAC Inpatient Mobility Raw Score : 14 (01/03/23 1333)  AM-PAC Inpatient T-Scale Score : 38.1 (01/03/23 1333)  Mobility Inpatient CMS 0-100% Score: 61.29 (01/03/23 1333)  Mobility Inpatient CMS G-Code Modifier : CL (01/03/23 1333)               Goals  Short Term Goals  Time Frame for Short Term Goals: 14 visits  Short Term Goal 1: Complete bed mobility with mod I  Short Term Goal 2: Complete transfers with mod I and RW  Short Term Goal 3: Complete 100 ft of gait with mod I and RW  Short Term Goal 4: Participate in 30 minutes of therapy to promote endurance       Education  Patient Education  Education Given To: Patient  Education Provided: Role of Therapy;Plan of Care  Education Method: Demonstration;Verbal  Barriers to Learning: None  Education Outcome: Verbalized understanding;Demonstrated understanding      Therapy Time   Individual Concurrent Group Co-treatment   Time In 7778         Time Out 1053         Minutes 21         Timed Code Treatment Minutes: [address] [facility],

## 2023-01-03 NOTE — PROGRESS NOTES
[facility]  Office: [address], DO, Monika Navarro, DO, Preethi Kyle, DO, Colin Jensen, DO, Jose J Walker MD, Stiven Vieyra MD, Erin Murray MD, Luvenia Duverney, MD,  Lobo Christensen MD, Eloise Cowart MD, Sam Lopez, DO, Tracy Kramer MD,  Odell Bradford MD, Ivis Arauz MD, Danuta Griffiths, DO, Rodney Stafford MD, Pradeep Miller MD, Paulina Diaz DO, Jeremy Vuong MD, Janae Garcia MD, Kami Gomez MD, Blanye Lantigua MD, Jerry Gtz, DO, Sarahy Greenwood MD, Pete Martinez MD, Mahendra Beauchamp, CNP,  Isabel Chester, CNP, Jacob Burgess, CNP, Adry Lua, CNP,  Franky Randhawa, [facility], Vivek Pink, CNP, Honey Cherry, CNP, Adelaide Fountain, CNP, Zohreh Veras, CNP, Ephraim Tompkins, CNP, SERGO DaviesC, Reid Garcia, CNS, Pieter Douglas, CNP, Aaron Rowland, [address] [place] [address]    Progress Note    1/3/2023    9:21 AM    Name:   Erica Ibrahim  MRN:     [id number]     Acct:      [de-identified]   Room:   [address] [place] [address] Day:  2  Admit Date:  1/1/2023  4:20 PM    PCP:   No primary care provider on file. Code Status:  DNR-CCA    Subjective:     C/C:   Chief Complaint   Patient presents with    Fall     Interval History Status: not changed. Patient evaluated in room resting in bed, endorsing shortness of breath, currently tolerating low flow nasal cannula does not wear oxygen at home. Also having issues with urinary retention and had to be straight cath last night for PVR greater than 900 mL.   Asking for her home dose of Norco, no other patient concerns at this time    Brief History:     Erica Ibrahim is a 68 y.o. 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 Status post fall earlier today while attempting to transfer from bed to wheelchair to bathroom, patient fell on the ground, denies LOC. Did not hit head. Patient does complain of flulike symptoms with worsening nausea vomiting right lower quadrant abdominal pain with shortness of breath, cough, throbbing headaches and worsening arthralgias/myalgias. Symptoms began 2 days prior to arrival with intermittent headaches, nausea sinus congestion ear fullness and cough . Cough is nonproductive . Denies pleurisy or hemoptysis. Describes dizziness /acute lightheadedness prior to falling with transferring out of bed. Review of Systems:     Constitutional:  negative for chills, fevers, sweats, endorses chronic bilateral lower extremity pain  Respiratory:  negative for cough, dyspnea on exertion, +shortness of breath, wheezing  Cardiovascular:  negative for chest pain, chest pressure/discomfort, lower extremity edema, palpitations  Gastrointestinal:  + chronic abdominal pain per patient , constipation, diarrhea, nausea, vomiting  Neurological:  negative for dizziness, headache    Medications: Allergies:     Allergies   Allergen Reactions    Prednisone Anxiety    Compazine [Prochlorperazine Maleate]     Penicillin V Potassium     Penicillins Other (See Comments)     Shock- received meropenem and ceftriaxone wo issues 2020       Current Meds:   Scheduled Meds:    amitriptyline  25 mg Oral TID    busPIRone  10 mg Oral BID    escitalopram  20 mg Oral Daily    gabapentin  300 mg Oral TID    metoprolol succinate  25 mg Oral Daily    QUEtiapine  50 mg Oral Daily    QUEtiapine  100 mg Oral Nightly    traZODone  100 mg Oral Nightly    atorvastatin  40 mg Oral Daily    aspirin  81 mg Oral Daily    guaiFENesin  600 mg Oral BID    pantoprazole (PROTONIX) 40 mg injection  40 mg IntraVENous Q12H    clonazePAM  0.5 mg Oral TID    remdesivir IVPB  100 mg IntraVENous Q24H    sodium chloride flush  5-40 mL IntraVENous 2 times per day    enoxaparin  40 mg SubCUTAneous Daily    [Held by provider] dexamethasone  6 mg IntraVENous Q24H     Continuous Infusions:    sodium chloride       PRN Meds: benzonatate, ipratropium-albuterol, metoclopramide, oxyCODONE-acetaminophen, sodium chloride, sodium chloride flush, sodium chloride, ondansetron **OR** ondansetron, polyethylene glycol, albuterol, acetaminophen **OR** acetaminophen    Data:     Past Medical History:   has a past medical history of A-fib ([facility] Acquired absence of kidney, Adult hypertrophic pyloric stenosis, Agoraphobia with panic disorder, Agoraphobia without history of panic disorder, Allergic rhinitis, Anemia, unspecified, Anxiety, Anxiety disorder, Arthritis, Atrial fibrillation (HCC), Back pain, Bronchitis, Caffeine use, Cardiomegaly, Carpal tunnel syndrome, Cataracts, bilateral, Centriacinar emphysema (HCC), Chronic kidney disease, stage III (moderate) ([facility]), Chronic pain, Constipation, Constipation, COPD (chronic obstructive pulmonary disease) ([facility] Cystitis with hematuria, Depression, Diaphragmatic hernia without obstruction or gangrene, Diarrhea, Difficulty walking, Dry eye syndrome of unspecified lacrimal gland, Esophageal obstruction, FOM (frequency of micturition), Foot drop, right foot, Gastro-esophageal reflux disease with esophagitis, with bleeding, GERD (gastroesophageal reflux disease), H/O gastric ulcer, HTN (hypertension), Hyperlipidemia, Hypertension, essential, Klebsiella pneumoniae (k. pneumoniae) as the cause of diseases classified elsewhere, Major depression, recurrent ([facility] Mitral valve prolapse, Mixed hyperlipidemia, Mumps, Muscle weakness (generalized), MVP (mitral valve prolapse), Old myocardial infarction, Personal history of disease of digestive system, Psychophysiological insomnia, Recurrent UTI, S/P PICC central line placement, Sepsis ([facility] Sinusitis, Tracheostomy in place [facility]), Ulcerative esophagitis, Urinary tract infection, site not specified, Uses wheelchair, Wears glasses, and Wellness examination. Social History:   reports that she has quit smoking. Her smoking use included cigarettes. She has a 50.00 pack-year smoking history. She has never used smokeless tobacco. She reports that she does not drink alcohol and does not use drugs. Family History:   Family History   Problem Relation Age of Onset    Cancer Mother         uterine    Heart Attack Mother     Heart Attack Father     Other Maternal Grandfather         foot gangrene    Other Paternal Grandmother         bleeding ulcers    Other Paternal Grandfather         lung cancer       Vitals:  /87   Pulse 96   Temp 98.1 °F (36.7 °C) (Oral)   Resp 20   Ht 5' 2\" (1.575 m)   Wt 179 lb (81.2 kg)   SpO2 96%   BMI 32.74 kg/m²   Temp (24hrs), Av.2 °F (36.8 °C), Min:98.1 °F (36.7 °C), Max:98.2 °F (36.8 °C)    No results for input(s): POCGLU in the last 72 hours. I/O (24Hr):   No intake or output data in the 24 hours ending 23    Labs:  Hematology:  Recent Labs     23   WBC 7.7  --  6.5  --    RBC 4.93  --  5.09  --    HGB 14.9  --  15.1  --    HCT 46.8  --  47.7*  --    MCV 94.9  --  93.7  --    MCH 30.2  --  29.7  --    MCHC 31.8  --  31.7  --    RDW 14.6*  --  14.5*  --    *  --  See Reflexed IPF Result  --    MPV 11.1  --   --   --    CRP  --  149.5* 151.5* 66.6*   INR  --   --  1.0  --    DDIMER  --  0.50 0.70  --      Chemistry:  Recent Labs     23    132* 141   K 4.6 4.0 4.1    94* 104   CO2 21 26 24   GLUCOSE 104* 157* 100*   BUN  23   CREATININE 1.09* 0.98* 0.80   MG 1.8  --   --    ANIONGAP 13 12 13   LABGLOM 52* 59* >60   CALCIUM 10.0 9.2 8.5*   TROPHS  --  14  --    LACTACIDWB 1.5 1.5  --      Recent Labs     23  1702 23  2318 23  0559   PROT 7.0  --  7.2   LABALBU 3.7  --  3.9   AST 22  --  19   ALT 21  --  19   LDH  --  180  --    ALKPHOS 115*  --  109*   BILITOT 0.3  --  0.3   LIPASE 16  --   --      ABG:  Lab Results   Component Value Date/Time    POCPH 7.437 05/30/2020 05:41 AM    POCPCO2 42.3 05/30/2020 05:41 AM    POCPO2 129.9 05/30/2020 05:41 AM    POCHCO3 28.6 05/30/2020 05:41 AM    NBEA NOT REPORTED 05/30/2020 05:41 AM    PBEA 4 05/30/2020 05:41 AM    KFT0BOO 30 05/30/2020 05:41 AM    YCWT2NHT 99 05/30/2020 05:41 AM    FIO2 NOT REPORTED 11/23/2021 09:01 PM     Lab Results   Component Value Date/Time    SPECIAL LT FOREARM 10ML 01/01/2023 05:05 PM     Lab Results   Component Value Date/Time    CULTURE NO GROWTH 1 DAY 01/01/2023 05:05 PM       Radiology:  CT ABDOMEN PELVIS WO CONTRAST Additional Contrast? None    Result Date: 1/1/2023  Mild atelectasis or developing infiltrate in the right base. Small bilateral pleural effusions. Mild hepatosplenomegaly and fatty infiltration of the liver Status post right nephrectomy, cholecystectomy and hysterectomy Large hiatal hernia     XR KNEE LEFT (3 VIEWS)    Result Date: 1/1/2023  Mild to moderate osteoarthritis of the left knee. No acute bony abnormalities are noted of the bilateral hips, left shoulder and left knee. Total right hip arthropasty without acute hardware complication. CT HEAD WO CONTRAST    Result Date: 1/1/2023  Mild central and cortical cerebral atrophy. No acute intracranial abnormalities are noted. XR SHOULDER LEFT (MIN 2 VIEWS)    Result Date: 1/1/2023  Mild to moderate osteoarthritis of the left knee. No acute bony abnormalities are noted of the bilateral hips, left shoulder and left knee. Total right hip arthropasty without acute hardware complication. XR CHEST PORTABLE    Result Date: 1/2/2023  No acute cardiopulmonary process     XR CHEST PORTABLE    Result Date: 1/1/2023  No acute process.      XR HIP W PELVIS MIN 5 VWS BILATERAL    Result Date: 1/1/2023  Mild to moderate osteoarthritis of the left knee. No acute bony abnormalities are noted of the bilateral hips, left shoulder and left knee. Total right hip arthropasty without acute hardware complication. Physical Examination:        General appearance:  alert, cooperative and no distress  Mental Status:  oriented to person, place and time and normal affect  Lungs:  clear to auscultation bilaterally, normal effort, 4 LLFNC  Heart:  regular rate and rhythm, no murmur  Abdomen:  soft, nontender, nondistended, normal bowel sounds, no masses, hepatomegaly, splenomegaly  Extremities:  no edema, redness, tenderness in the calves  Skin:  no gross lesions, rashes, induration    Assessment:        Hospital Problems             Last Modified POA    * (Principal) COVID-19 1/1/2023 Yes    Intractable abdominal pain 1/1/2023 Yes    Acute on chronic respiratory failure with hypoxia ([address] 1/1/2023 Yes    COVID 1/2/2023 Yes    Hypoxia 1/2/2023 Yes    Elevated C-reactive protein (CRP) 1/2/2023 Yes    Chronic obstructive pulmonary disease ([address] 1/2/2023 Yes    Acute respiratory failure with hypoxia (HCC) 1/2/2023 Yes    GERD (gastroesophageal reflux disease) 1/1/2023 Yes    Overview Signed 12/18/2021  7:10 AM by MARGOTH Horner NP     Last Assessment & Plan:   Formatting of this note might be different from the original.  S/p hydrostatic balloon dilation. Considering POEM  Formatting of this note might be different from the original.  Last Assessment & Plan:   S/p hydrostatic balloon dilation.  Considering POEM         Dysphagia 1/1/2023 Yes    Hiatal hernia 1/1/2023 Yes    History of repair of hiatal hernia 1/1/2023 Yes    Centrilobular emphysema ([address] 1/1/2023 Yes    H/O gastric ulcer 1/1/2023 Yes    Hyperlipidemia 1/1/2023 Yes    Essential hypertension 1/1/2023 Yes    S/P Nissen fundoplication (without gastrostomy tube) procedure 1/1/2023 Yes    Expressive aphasia 1/1/2023 Yes    Chronic pain 1/1/2023 Yes    Stage 3 chronic kidney disease ([facility] 1/1/2023 Yes    Difficulty walking 1/1/2023 Yes       Plan:        COVID-19 viral infection with elevated CRP: Infectious disease following. On remdesivir. Further recommendations and management per their evaluation. CRP downtrending. Gram-negative rods growing in respiratory culture. Acute on chronic hypoxic respiratory failure: Secondary to #1. Continue low-flow O2, inhalers, mucolytic's  Anxiety: Currently stable, continue Seroquel, Lexapro, BuSpar, Elavil. CKD stage IIIa: Stable, creatinine at baseline.   Nausea with emesis: Continue antiemetics, encourage p.o. intake, continue Reglan  Chronic paraplegia: Passive range of motion encouraged  GI/DVT prophylaxis: Protonix, start Lovenox twice daily    MARGOTH Martinez NP  1/3/2023  9:21 AM

## 2023-01-04 LAB
ALBUMIN SERPL-MCNC: 3.1 G/DL (ref 3.5–5.2)
ALBUMIN/GLOBULIN RATIO: 1.1 (ref 1–2.5)
ALP BLD-CCNC: 89 U/L (ref 35–104)
ALT SERPL-CCNC: 12 U/L (ref 5–33)
ANION GAP SERPL CALCULATED.3IONS-SCNC: 13 MMOL/L (ref 9–17)
AST SERPL-CCNC: 13 U/L
BILIRUB SERPL-MCNC: 0.3 MG/DL (ref 0.3–1.2)
BILIRUBIN DIRECT: 0.1 MG/DL
BILIRUBIN, INDIRECT: 0.2 MG/DL (ref 0–1)
BUN BLDV-MCNC: 19 MG/DL (ref 8–23)
C-REACTIVE PROTEIN: 53.9 MG/L (ref 0–5)
CALCIUM SERPL-MCNC: 8.5 MG/DL (ref 8.6–10.4)
CHLORIDE BLD-SCNC: 102 MMOL/L (ref 98–107)
CO2: 21 MMOL/L (ref 20–31)
CREAT SERPL-MCNC: 0.82 MG/DL (ref 0.5–0.9)
CULTURE: ABNORMAL
DIRECT EXAM: ABNORMAL
GFR SERPL CREATININE-BSD FRML MDRD: >60 ML/MIN/1.73M2
GLUCOSE BLD-MCNC: 101 MG/DL (ref 65–105)
GLUCOSE BLD-MCNC: 102 MG/DL (ref 65–105)
GLUCOSE BLD-MCNC: 107 MG/DL (ref 65–105)
GLUCOSE BLD-MCNC: 135 MG/DL (ref 70–99)
HCT VFR BLD CALC: 40.1 % (ref 36.3–47.1)
HEMOGLOBIN: 12.5 G/DL (ref 11.9–15.1)
LIPASE: 19 U/L (ref 13–60)
MCH RBC QN AUTO: 30.2 PG (ref 25.2–33.5)
MCHC RBC AUTO-ENTMCNC: 31.2 G/DL (ref 28.4–34.8)
MCV RBC AUTO: 96.9 FL (ref 82.6–102.9)
NRBC AUTOMATED: 0 PER 100 WBC
PDW BLD-RTO: 14.4 % (ref 11.8–14.4)
PLATELET # BLD: NORMAL K/UL (ref 138–453)
PLATELET, FLUORESCENCE: 153 K/UL (ref 138–453)
PLATELET, IMMATURE FRACTION: 6.3 % (ref 1.1–10.3)
POTASSIUM SERPL-SCNC: 3.8 MMOL/L (ref 3.7–5.3)
RBC # BLD: 4.14 M/UL (ref 3.95–5.11)
SODIUM BLD-SCNC: 136 MMOL/L (ref 135–144)
SPECIMEN DESCRIPTION: ABNORMAL
TOTAL PROTEIN: 5.8 G/DL (ref 6.4–8.3)
WBC # BLD: 5.7 K/UL (ref 3.5–11.3)

## 2023-01-04 PROCEDURE — 99232 SBSQ HOSP IP/OBS MODERATE 35: CPT | Performed by: NURSE PRACTITIONER

## 2023-01-04 PROCEDURE — 2700000000 HC OXYGEN THERAPY PER DAY

## 2023-01-04 PROCEDURE — 6370000000 HC RX 637 (ALT 250 FOR IP): Performed by: INTERNAL MEDICINE

## 2023-01-04 PROCEDURE — 2580000003 HC RX 258: Performed by: NURSE PRACTITIONER

## 2023-01-04 PROCEDURE — 6370000000 HC RX 637 (ALT 250 FOR IP): Performed by: NURSE PRACTITIONER

## 2023-01-04 PROCEDURE — C9113 INJ PANTOPRAZOLE SODIUM, VIA: HCPCS | Performed by: INTERNAL MEDICINE

## 2023-01-04 PROCEDURE — 85055 RETICULATED PLATELET ASSAY: CPT

## 2023-01-04 PROCEDURE — 6360000002 HC RX W HCPCS: Performed by: NURSE PRACTITIONER

## 2023-01-04 PROCEDURE — 51702 INSERT TEMP BLADDER CATH: CPT

## 2023-01-04 PROCEDURE — 6360000002 HC RX W HCPCS: Performed by: INTERNAL MEDICINE

## 2023-01-04 PROCEDURE — 83690 ASSAY OF LIPASE: CPT

## 2023-01-04 PROCEDURE — A4216 STERILE WATER/SALINE, 10 ML: HCPCS | Performed by: INTERNAL MEDICINE

## 2023-01-04 PROCEDURE — 2060000000 HC ICU INTERMEDIATE R&B

## 2023-01-04 PROCEDURE — 94761 N-INVAS EAR/PLS OXIMETRY MLT: CPT

## 2023-01-04 PROCEDURE — 2580000003 HC RX 258: Performed by: INTERNAL MEDICINE

## 2023-01-04 PROCEDURE — 99232 SBSQ HOSP IP/OBS MODERATE 35: CPT | Performed by: INTERNAL MEDICINE

## 2023-01-04 PROCEDURE — 86140 C-REACTIVE PROTEIN: CPT

## 2023-01-04 PROCEDURE — 85027 COMPLETE CBC AUTOMATED: CPT

## 2023-01-04 PROCEDURE — 36415 COLL VENOUS BLD VENIPUNCTURE: CPT

## 2023-01-04 PROCEDURE — 82947 ASSAY GLUCOSE BLOOD QUANT: CPT

## 2023-01-04 PROCEDURE — 80076 HEPATIC FUNCTION PANEL: CPT

## 2023-01-04 PROCEDURE — 80048 BASIC METABOLIC PNL TOTAL CA: CPT

## 2023-01-04 RX ORDER — METOCLOPRAMIDE HYDROCHLORIDE 5 MG/ML
10 INJECTION INTRAMUSCULAR; INTRAVENOUS EVERY 6 HOURS PRN
Status: DISCONTINUED | OUTPATIENT
Start: 2023-01-04 | End: 2023-01-05

## 2023-01-04 RX ORDER — 0.9 % SODIUM CHLORIDE 0.9 %
500 INTRAVENOUS SOLUTION INTRAVENOUS ONCE
Status: COMPLETED | OUTPATIENT
Start: 2023-01-04 | End: 2023-01-04

## 2023-01-04 RX ORDER — METOPROLOL SUCCINATE 25 MG/1
12.5 TABLET, EXTENDED RELEASE ORAL DAILY
Status: DISCONTINUED | OUTPATIENT
Start: 2023-01-05 | End: 2023-01-06 | Stop reason: HOSPADM

## 2023-01-04 RX ADMIN — GABAPENTIN 300 MG: 300 CAPSULE ORAL at 09:05

## 2023-01-04 RX ADMIN — SODIUM CHLORIDE 500 ML: 9 INJECTION, SOLUTION INTRAVENOUS at 12:48

## 2023-01-04 RX ADMIN — ONDANSETRON 4 MG: 2 INJECTION INTRAMUSCULAR; INTRAVENOUS at 08:55

## 2023-01-04 RX ADMIN — BENZONATATE 200 MG: 100 CAPSULE ORAL at 17:08

## 2023-01-04 RX ADMIN — METOCLOPRAMIDE 10 MG: 5 INJECTION, SOLUTION INTRAMUSCULAR; INTRAVENOUS at 15:34

## 2023-01-04 RX ADMIN — TRAZODONE HYDROCHLORIDE 100 MG: 100 TABLET ORAL at 21:19

## 2023-01-04 RX ADMIN — BUSPIRONE HYDROCHLORIDE 10 MG: 10 TABLET ORAL at 21:19

## 2023-01-04 RX ADMIN — GUAIFENESIN 600 MG: 600 TABLET, EXTENDED RELEASE ORAL at 09:05

## 2023-01-04 RX ADMIN — AMITRIPTYLINE HYDROCHLORIDE 25 MG: 25 TABLET, FILM COATED ORAL at 15:34

## 2023-01-04 RX ADMIN — SODIUM CHLORIDE, PRESERVATIVE FREE 40 MG: 5 INJECTION INTRAVENOUS at 09:03

## 2023-01-04 RX ADMIN — BUSPIRONE HYDROCHLORIDE 10 MG: 10 TABLET ORAL at 09:04

## 2023-01-04 RX ADMIN — ENOXAPARIN SODIUM 30 MG: 100 INJECTION SUBCUTANEOUS at 21:18

## 2023-01-04 RX ADMIN — CLONAZEPAM 0.5 MG: 1 TABLET ORAL at 15:32

## 2023-01-04 RX ADMIN — QUETIAPINE FUMARATE 100 MG: 100 TABLET ORAL at 21:19

## 2023-01-04 RX ADMIN — QUETIAPINE FUMARATE 50 MG: 100 TABLET ORAL at 09:06

## 2023-01-04 RX ADMIN — ACETAMINOPHEN 650 MG: 325 TABLET ORAL at 15:31

## 2023-01-04 RX ADMIN — OXYCODONE HYDROCHLORIDE AND ACETAMINOPHEN 1 TABLET: 5; 325 TABLET ORAL at 17:08

## 2023-01-04 RX ADMIN — CLONAZEPAM 0.5 MG: 1 TABLET ORAL at 08:58

## 2023-01-04 RX ADMIN — SODIUM CHLORIDE, PRESERVATIVE FREE 5 ML: 5 INJECTION INTRAVENOUS at 09:07

## 2023-01-04 RX ADMIN — TAMSULOSIN HYDROCHLORIDE 0.4 MG: 0.4 CAPSULE ORAL at 09:06

## 2023-01-04 RX ADMIN — ESCITALOPRAM OXALATE 20 MG: 10 TABLET ORAL at 09:05

## 2023-01-04 RX ADMIN — ATORVASTATIN CALCIUM 40 MG: 80 TABLET, FILM COATED ORAL at 21:19

## 2023-01-04 RX ADMIN — AMITRIPTYLINE HYDROCHLORIDE 25 MG: 25 TABLET, FILM COATED ORAL at 21:19

## 2023-01-04 RX ADMIN — SODIUM CHLORIDE, PRESERVATIVE FREE 40 MG: 5 INJECTION INTRAVENOUS at 21:18

## 2023-01-04 RX ADMIN — Medication 81 MG: at 09:05

## 2023-01-04 RX ADMIN — METOPROLOL SUCCINATE 25 MG: 25 TABLET, EXTENDED RELEASE ORAL at 09:05

## 2023-01-04 RX ADMIN — OXYCODONE HYDROCHLORIDE AND ACETAMINOPHEN 1 TABLET: 5; 325 TABLET ORAL at 08:59

## 2023-01-04 RX ADMIN — ENOXAPARIN SODIUM 30 MG: 100 INJECTION SUBCUTANEOUS at 09:02

## 2023-01-04 RX ADMIN — CLONAZEPAM 0.5 MG: 1 TABLET ORAL at 21:19

## 2023-01-04 RX ADMIN — GABAPENTIN 300 MG: 300 CAPSULE ORAL at 15:33

## 2023-01-04 RX ADMIN — GUAIFENESIN 600 MG: 600 TABLET, EXTENDED RELEASE ORAL at 21:19

## 2023-01-04 RX ADMIN — SODIUM CHLORIDE, PRESERVATIVE FREE 10 ML: 5 INJECTION INTRAVENOUS at 21:20

## 2023-01-04 RX ADMIN — GABAPENTIN 300 MG: 300 CAPSULE ORAL at 21:18

## 2023-01-04 RX ADMIN — REMDESIVIR 100 MG: 100 INJECTION, POWDER, LYOPHILIZED, FOR SOLUTION INTRAVENOUS at 21:31

## 2023-01-04 RX ADMIN — ONDANSETRON 4 MG: 2 INJECTION INTRAMUSCULAR; INTRAVENOUS at 15:31

## 2023-01-04 RX ADMIN — AMITRIPTYLINE HYDROCHLORIDE 25 MG: 25 TABLET, FILM COATED ORAL at 09:05

## 2023-01-04 RX ADMIN — ACETAMINOPHEN 650 MG: 325 TABLET ORAL at 08:59

## 2023-01-04 ASSESSMENT — PAIN DESCRIPTION - ORIENTATION
ORIENTATION: RIGHT;LEFT
ORIENTATION: LEFT;MID;RIGHT
ORIENTATION: MID

## 2023-01-04 ASSESSMENT — PAIN DESCRIPTION - DESCRIPTORS
DESCRIPTORS: ACHING

## 2023-01-04 ASSESSMENT — PAIN DESCRIPTION - LOCATION
LOCATION: ABDOMEN;KNEE
LOCATION: ABDOMEN;KNEE
LOCATION: ABDOMEN

## 2023-01-04 ASSESSMENT — PAIN - FUNCTIONAL ASSESSMENT
PAIN_FUNCTIONAL_ASSESSMENT: ACTIVITIES ARE NOT PREVENTED

## 2023-01-04 ASSESSMENT — PAIN SCALES - GENERAL
PAINLEVEL_OUTOF10: 8
PAINLEVEL_OUTOF10: 4
PAINLEVEL_OUTOF10: 7

## 2023-01-04 NOTE — PROGRESS NOTES
Pt bp 82/47. Patient states she hasnt been drinking or eating much. Flaco Nesbitt np paged thru [employer].

## 2023-01-04 NOTE — PLAN OF CARE
Problem: Discharge Planning  Goal: Discharge to home or other facility with appropriate resources  Outcome: Progressing     Problem: Skin/Tissue Integrity  Goal: Absence of new skin breakdown  Description: 1. Monitor for areas of redness and/or skin breakdown  2. Assess vascular access sites hourly  3. Every 4-6 hours minimum:  Change oxygen saturation probe site  4. Every 4-6 hours:  If on nasal continuous positive airway pressure, respiratory therapy assess nares and determine need for appliance change or resting period.   Outcome: Progressing     Problem: ABCDS Injury Assessment  Goal: Absence of physical injury  Outcome: Progressing     Problem: Safety - Adult  Goal: Free from fall injury  Outcome: Progressing     Problem: Pain  Goal: Verbalizes/displays adequate comfort level or baseline comfort level  Outcome: Progressing  Flowsheets (Taken 1/3/2023 1550 by Earline Croft RN)  Verbalizes/displays adequate comfort level or baseline comfort level: Encourage patient to monitor pain and request assistance

## 2023-01-04 NOTE — PROGRESS NOTES
[facility]  Office: [address], DO, Loretta Foley, DO, Shawn Caldwell, DO, Viviana Jensen, DO, Cleveland Garcia MD, Gaurav Hilton MD, Tia Whyte MD, Lorena Perez MD,  Rosette Aguirre MD, Krys Valdes MD, Samreen Saleh DO, Tim Ware MD,  Zev Callahan MD, Remington Diaz MD, Casandra Parks, DO, Kasie Rodriguez MD, Josue Cordova MD, Rodo Ramírez, DO, Shaniqua Seay MD, Yunier Seo MD, Adilene Reddy MD, Cinthya Aguilar MD, Casi Fox DO, Frank Luu MD, Tika Duran MD, Francisco Keane, CNP,  Washington Bowman, CNP, Foster Berry, CNP, Peri Boland, CNP,  Noel Aguilar, [facility], Josefina Dumas, CNP, Sacha Parada, CNP, Manjit Garzon, CNP, Cassie Ojeda, CNP, Smooth Alba, CNP, Jules James, PA-C, Rogelio Torres, CNS, Simon Steel, CNP, Tobi Jean, CNP         [address]ur[address]    Progress Note    1/4/2023    9:14 AM    Name:   Lavell Astudillo  MRN:     [id number]     Acct:      [de-identified]   Room:   [address] [place] [address] Day:  3  Admit Date:  1/1/2023  4:20 PM    PCP:   Marilee Andino DO  Code Status:  DNR-CCA    Subjective:     C/C:   Chief Complaint   Patient presents with    Fall     Interval History Status: not changed. Patient evaluated in room resting in bed, Saldana catheter inserted for PVRs greater than 900 yesterday. Patient not eating or drinking well secondary to chronic abdominal pain. Brief History:     Lavell Astudillo is a 68 y.o. 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      Status post fall earlier today while attempting to transfer from bed to wheelchair to bathroom, patient fell on the ground, denies LOC. Did not hit head. Patient does complain of flulike symptoms with worsening nausea vomiting right lower quadrant abdominal pain with shortness of breath, cough, throbbing headaches and worsening arthralgias/myalgias. Symptoms began 2 days prior to arrival with intermittent headaches, nausea sinus congestion ear fullness and cough . Cough is nonproductive . Denies pleurisy or hemoptysis. Describes dizziness /acute lightheadedness prior to falling with transferring out of bed. Review of Systems:     Constitutional:  negative for chills, fevers, sweats, endorses chronic bilateral lower extremity pain  Respiratory:  negative for cough, dyspnea on exertion, +shortness of breath, wheezing  Cardiovascular:  negative for chest pain, chest pressure/discomfort, lower extremity edema, palpitations  Gastrointestinal:  + chronic abdominal pain per patient , constipation, diarrhea, nausea, vomiting  Neurological:  negative for dizziness, headache    Medications: Allergies:     Allergies   Allergen Reactions    Prednisone Anxiety    Compazine [Prochlorperazine Maleate]     Penicillin V Potassium     Penicillins Other (See Comments)     Shock- received meropenem and ceftriaxone wo issues 2020       Current Meds:   Scheduled Meds:    enoxaparin  30 mg SubCUTAneous BID    tamsulosin  0.4 mg Oral Daily    amitriptyline  25 mg Oral TID    busPIRone  10 mg Oral BID    escitalopram  20 mg Oral Daily    gabapentin  300 mg Oral TID    metoprolol succinate  25 mg Oral Daily    QUEtiapine  50 mg Oral Daily    QUEtiapine  100 mg Oral Nightly    traZODone  100 mg Oral Nightly    atorvastatin  40 mg Oral Daily    aspirin  81 mg Oral Daily    guaiFENesin  600 mg Oral BID    pantoprazole (PROTONIX) 40 mg injection  40 mg IntraVENous Q12H    clonazePAM  0.5 mg Oral TID    remdesivir IVPB  100 mg IntraVENous Q24H    sodium chloride flush  5-40 mL IntraVENous 2 times per day    [Held by provider] dexamethasone  6 mg IntraVENous Q24H     Continuous Infusions:    sodium chloride 20 mL/hr at 01/03/23 2224     PRN Meds: bisacodyl, benzonatate, ipratropium-albuterol, metoclopramide, oxyCODONE-acetaminophen, sodium chloride, sodium chloride flush, sodium chloride, ondansetron **OR** ondansetron, polyethylene glycol, albuterol, acetaminophen **OR** acetaminophen    Data:     Past Medical History:   has a past medical history of A-fib ([facility] Acquired absence of kidney, Adult hypertrophic pyloric stenosis, Agoraphobia with panic disorder, Agoraphobia without history of panic disorder, Allergic rhinitis, Anemia, unspecified, Anxiety, Anxiety disorder, Arthritis, Atrial fibrillation (HCC), Back pain, Bronchitis, Caffeine use, Cardiomegaly, Carpal tunnel syndrome, Cataracts, bilateral, Centriacinar emphysema (HCC), Chronic kidney disease, stage III (moderate) ([facility]), Chronic pain, Constipation, Constipation, COPD (chronic obstructive pulmonary disease) ([facility] [address] Cystitis with hematuria, Depression, Diaphragmatic hernia without obstruction or gangrene, Diarrhea, Difficulty walking, Dry eye syndrome of unspecified lacrimal gland, Esophageal obstruction, FOM (frequency of micturition), Foot drop, right foot, Gastro-esophageal reflux disease with esophagitis, with bleeding, GERD (gastroesophageal reflux disease), H/O gastric ulcer, HTN (hypertension), Hyperlipidemia, Hypertension, essential, Klebsiella pneumoniae (k. pneumoniae) as the cause of diseases classified elsewhere, Major depression, recurrent ([facility] [address] Mitral valve prolapse, Mixed hyperlipidemia, Mumps, Muscle weakness (generalized), MVP (mitral valve prolapse), Old myocardial infarction, Personal history of disease of digestive system, Psychophysiological insomnia, Recurrent UTI, S/P PICC central line placement, Sepsis ([facility] [address] Sinusitis, Tracheostomy in place [facility]), Ulcerative esophagitis, Urinary tract infection, site not specified, Uses wheelchair, Wears glasses, and Wellness examination. Social History:   reports that she has quit smoking. Her smoking use included cigarettes. She has a 50.00 pack-year smoking history. She has never used smokeless tobacco. She reports that she does not drink alcohol and does not use drugs. Family History:   Family History   Problem Relation Age of Onset    Cancer Mother         uterine    Heart Attack Mother     Heart Attack Father     Other Maternal Grandfather         foot gangrene    Other Paternal Grandmother         bleeding ulcers    Other Paternal Grandfather         lung cancer       Vitals:  /62   Pulse 83   Temp 98 °F (36.7 °C) (Oral)   Resp 22   Ht 5' 2\" (1.575 m)   Wt 175 lb 0.7 oz (79.4 kg)   SpO2 96%   BMI 32.02 kg/m²   Temp (24hrs), Av.6 °F (37 °C), Min:98 °F (36.7 °C), Max:99.9 °F (37.7 °C)    Recent Labs     23  1136 23  0749   POCGLU 121* 102       I/O (24Hr):     Intake/Output Summary (Last 24 hours) at 2023 0914  Last data filed at 2023 0645  Gross per 24 hour   Intake 1060.31 ml   Output 700 ml   Net 360.31 ml       Labs:  Hematology:  Recent Labs     2359 23   WBC 7.7  --  6.5  --    RBC 4.93  --  5.09  --    HGB 14.9  --  15.1  --    HCT 46.8  --  47.7*  --    MCV 94.9  --  93.7  --    MCH 30.2  --  29.7  --    MCHC 31.8  --  31.7  --    RDW 14.6*  --  14.5*  --    *  --  See Reflexed IPF Result  --    MPV 11.1  --   --   --    CRP  --  149.5* 151.5* 66.6*   INR  --   --  1.0  --    DDIMER  --  0.50 0.70  --      Chemistry:  Recent Labs     2359 23    132* 141   K 4.6 4.0 4.1    94* 104   CO2 21 26 24   GLUCOSE 104* 157* 100*   BUN 21  23   CREATININE 1.09* 0.98* 0.80   MG 1.8  --   --    ANIONGAP 13 12 13   LABGLOM 52* 59* >60   CALCIUM 10.0 9.2 8.5*   TROPHS  --  14  --    LACTACIDWB 1.5 1.5  --      Recent Labs     23  1702 23  0416 01/02/23  0559 01/03/23  1136 01/04/23  0749   PROT 7.0  --  7.2  --   --    LABALBU 3.7  --  3.9  --   --    AST 22  --  19  --   --    ALT 21  --  19  --   --    LDH  --  180  --   --   --    ALKPHOS 115*  --  109*  --   --    BILITOT 0.3  --  0.3  --   --    LIPASE 16  --   --   --   --    POCGLU  --   --   --  121* 102     ABG:  Lab Results   Component Value Date/Time    POCPH 7.437 05/30/2020 05:41 AM    POCPCO2 42.3 05/30/2020 05:41 AM    POCPO2 129.9 05/30/2020 05:41 AM    POCHCO3 28.6 05/30/2020 05:41 AM    NBEA NOT REPORTED 05/30/2020 05:41 AM    PBEA 4 05/30/2020 05:41 AM    QDW8HYZ 30 05/30/2020 05:41 AM    NWFL5CEQ 99 05/30/2020 05:41 AM    FIO2 NOT REPORTED 11/23/2021 09:01 PM     Lab Results   Component Value Date/Time    SPECIAL LT FOREARM 10ML 01/01/2023 05:05 PM     Lab Results   Component Value Date/Time    CULTURE NO GROWTH 2 DAYS 01/01/2023 05:05 PM       Radiology:  CT ABDOMEN PELVIS WO CONTRAST Additional Contrast? None    Result Date: 1/1/2023  Mild atelectasis or developing infiltrate in the right base. Small bilateral pleural effusions. Mild hepatosplenomegaly and fatty infiltration of the liver Status post right nephrectomy, cholecystectomy and hysterectomy Large hiatal hernia     XR KNEE LEFT (3 VIEWS)    Result Date: 1/1/2023  Mild to moderate osteoarthritis of the left knee. No acute bony abnormalities are noted of the bilateral hips, left shoulder and left knee. Total right hip arthropasty without acute hardware complication. CT HEAD WO CONTRAST    Result Date: 1/1/2023  Mild central and cortical cerebral atrophy. No acute intracranial abnormalities are noted. XR SHOULDER LEFT (MIN 2 VIEWS)    Result Date: 1/1/2023  Mild to moderate osteoarthritis of the left knee. No acute bony abnormalities are noted of the bilateral hips, left shoulder and left knee. Total right hip arthropasty without acute hardware complication.      XR CHEST PORTABLE    Result Date: 1/2/2023  No acute cardiopulmonary process     XR CHEST PORTABLE    Result Date: 1/1/2023  No acute process. XR HIP W PELVIS MIN 5 VWS BILATERAL    Result Date: 1/1/2023  Mild to moderate osteoarthritis of the left knee. No acute bony abnormalities are noted of the bilateral hips, left shoulder and left knee. Total right hip arthropasty without acute hardware complication. Physical Examination:        General appearance:  alert, cooperative and no distress  Mental Status:  oriented to person, place and time and normal affect  Lungs:  clear to auscultation bilaterally, normal effort, 2 LLFNC  Heart:  regular rate and rhythm, no murmur  Abdomen:  soft, nontender, nondistended, normal bowel sounds, no masses, hepatomegaly, splenomegaly  Extremities:  no edema, redness, tenderness in the calves  Skin:  no gross lesions, rashes, induration    Assessment:        Hospital Problems             Last Modified POA    * (Principal) COVID-19 1/1/2023 Yes    Intractable abdominal pain 1/1/2023 Yes    Acute on chronic respiratory failure with hypoxia ([address] 1/1/2023 Yes    COVID 1/2/2023 Yes    Hypoxia 1/2/2023 Yes    Elevated C-reactive protein (CRP) 1/2/2023 Yes    Chronic obstructive pulmonary disease ([address] 1/2/2023 Yes    Acute respiratory failure with hypoxia (HCC) 1/2/2023 Yes    GERD (gastroesophageal reflux disease) 1/1/2023 Yes    Overview Signed 12/18/2021  7:10 AM by MARGOTH Alba - SREE     Last Assessment & Plan:   Formatting of this note might be different from the original.  S/p hydrostatic balloon dilation. Considering POEM  Formatting of this note might be different from the original.  Last Assessment & Plan:   S/p hydrostatic balloon dilation.  Considering POEM         Dysphagia 1/1/2023 Yes    Hiatal hernia 1/1/2023 Yes    History of repair of hiatal hernia 1/1/2023 Yes    Centrilobular emphysema ([address] 1/1/2023 Yes    H/O gastric ulcer 1/1/2023 Yes    Hyperlipidemia 1/1/2023 Yes    Essential hypertension 1/1/2023 Yes S/P Nissen fundoplication (without gastrostomy tube) procedure 1/1/2023 Yes    Expressive aphasia 1/1/2023 Yes    Chronic pain 1/1/2023 Yes    Stage 3 chronic kidney disease ([address] 1/1/2023 Yes    Difficulty walking 1/1/2023 Yes       Plan:        COVID-19 viral infection with elevated CRP: Infectious disease following. On remdesivir. Further recommendations and management per their evaluation. CRP downtrending. Constipation: Continue bowel meds, abdominal x-ray reviewed without evidence of constipation on imaging  Acute on chronic hypoxic respiratory failure: Secondary to #1. Continue low-flow O2, inhalers, mucolytic's  Hypotension: 500 mL fluid bolus, decrease beta-blocker  Anxiety: Fluctuating, continue Seroquel, Lexapro, BuSpar, Elavil. CKD stage IIIa: Stable, creatinine at baseline.   Nausea with emesis: Continue antiemetics, encourage p.o. intake, continue Reglan, increased dose for the next 24 hours  Chronic paraplegia: Passive range of motion encouraged  GI/DVT prophylaxis: Protonix, start Lovenox twice daily  Stable for discharge once treatment is completed for COVID    MARGOTH Tang NP  1/4/2023  9:14 AM

## 2023-01-05 ENCOUNTER — APPOINTMENT (OUTPATIENT)
Dept: GENERAL RADIOLOGY | Age: 78
End: 2023-01-05
Payer: MEDICARE

## 2023-01-05 LAB
ANION GAP SERPL CALCULATED.3IONS-SCNC: 10 MMOL/L (ref 9–17)
BUN BLDV-MCNC: 15 MG/DL (ref 8–23)
CALCIUM SERPL-MCNC: 8.3 MG/DL (ref 8.6–10.4)
CHLORIDE BLD-SCNC: 103 MMOL/L (ref 98–107)
CO2: 23 MMOL/L (ref 20–31)
CREAT SERPL-MCNC: 0.79 MG/DL (ref 0.5–0.9)
GFR SERPL CREATININE-BSD FRML MDRD: >60 ML/MIN/1.73M2
GLUCOSE BLD-MCNC: 103 MG/DL (ref 70–99)
HCT VFR BLD CALC: 40.5 % (ref 36.3–47.1)
HEMOGLOBIN: 12.2 G/DL (ref 11.9–15.1)
MCH RBC QN AUTO: 29.9 PG (ref 25.2–33.5)
MCHC RBC AUTO-ENTMCNC: 30.1 G/DL (ref 28.4–34.8)
MCV RBC AUTO: 99.3 FL (ref 82.6–102.9)
NRBC AUTOMATED: 0 PER 100 WBC
PDW BLD-RTO: 14.2 % (ref 11.8–14.4)
PLATELET # BLD: NORMAL K/UL (ref 138–453)
PLATELET, FLUORESCENCE: 126 K/UL (ref 138–453)
PLATELET, IMMATURE FRACTION: 6.8 % (ref 1.1–10.3)
POTASSIUM SERPL-SCNC: 4 MMOL/L (ref 3.7–5.3)
PRO-BNP: 40 PG/ML
RBC # BLD: 4.08 M/UL (ref 3.95–5.11)
SODIUM BLD-SCNC: 136 MMOL/L (ref 135–144)
TROPONIN, HIGH SENSITIVITY: 11 NG/L (ref 0–14)
WBC # BLD: 6.2 K/UL (ref 3.5–11.3)

## 2023-01-05 PROCEDURE — 6360000002 HC RX W HCPCS: Performed by: NURSE PRACTITIONER

## 2023-01-05 PROCEDURE — 94640 AIRWAY INHALATION TREATMENT: CPT

## 2023-01-05 PROCEDURE — 2060000000 HC ICU INTERMEDIATE R&B

## 2023-01-05 PROCEDURE — 99232 SBSQ HOSP IP/OBS MODERATE 35: CPT | Performed by: NURSE PRACTITIONER

## 2023-01-05 PROCEDURE — 83880 ASSAY OF NATRIURETIC PEPTIDE: CPT

## 2023-01-05 PROCEDURE — 93005 ELECTROCARDIOGRAM TRACING: CPT | Performed by: INTERNAL MEDICINE

## 2023-01-05 PROCEDURE — 6370000000 HC RX 637 (ALT 250 FOR IP): Performed by: INTERNAL MEDICINE

## 2023-01-05 PROCEDURE — 2580000003 HC RX 258: Performed by: NURSE PRACTITIONER

## 2023-01-05 PROCEDURE — 71045 X-RAY EXAM CHEST 1 VIEW: CPT

## 2023-01-05 PROCEDURE — 85055 RETICULATED PLATELET ASSAY: CPT

## 2023-01-05 PROCEDURE — 6370000000 HC RX 637 (ALT 250 FOR IP): Performed by: NURSE PRACTITIONER

## 2023-01-05 PROCEDURE — 6360000002 HC RX W HCPCS: Performed by: INTERNAL MEDICINE

## 2023-01-05 PROCEDURE — 2580000003 HC RX 258: Performed by: INTERNAL MEDICINE

## 2023-01-05 PROCEDURE — 84484 ASSAY OF TROPONIN QUANT: CPT

## 2023-01-05 PROCEDURE — C9113 INJ PANTOPRAZOLE SODIUM, VIA: HCPCS | Performed by: INTERNAL MEDICINE

## 2023-01-05 PROCEDURE — 80048 BASIC METABOLIC PNL TOTAL CA: CPT

## 2023-01-05 PROCEDURE — 36415 COLL VENOUS BLD VENIPUNCTURE: CPT

## 2023-01-05 PROCEDURE — 99291 CRITICAL CARE FIRST HOUR: CPT | Performed by: INTERNAL MEDICINE

## 2023-01-05 PROCEDURE — 99232 SBSQ HOSP IP/OBS MODERATE 35: CPT | Performed by: INTERNAL MEDICINE

## 2023-01-05 PROCEDURE — 51702 INSERT TEMP BLADDER CATH: CPT

## 2023-01-05 PROCEDURE — 85027 COMPLETE CBC AUTOMATED: CPT

## 2023-01-05 RX ORDER — UREA 10 %
1 LOTION (ML) TOPICAL 2 TIMES DAILY
Status: DISCONTINUED | OUTPATIENT
Start: 2023-01-05 | End: 2023-01-05

## 2023-01-05 RX ORDER — CLONAZEPAM 0.5 MG/1
0.5 TABLET ORAL 3 TIMES DAILY PRN
Status: DISCONTINUED | OUTPATIENT
Start: 2023-01-05 | End: 2023-01-06 | Stop reason: HOSPADM

## 2023-01-05 RX ORDER — LANOLIN ALCOHOL/MO/W.PET/CERES
325 CREAM (GRAM) TOPICAL
Status: DISCONTINUED | OUTPATIENT
Start: 2023-01-06 | End: 2023-01-06 | Stop reason: HOSPADM

## 2023-01-05 RX ORDER — 0.9 % SODIUM CHLORIDE 0.9 %
500 INTRAVENOUS SOLUTION INTRAVENOUS ONCE
Status: COMPLETED | OUTPATIENT
Start: 2023-01-05 | End: 2023-01-05

## 2023-01-05 RX ORDER — AMOXICILLIN 250 MG
2 CAPSULE ORAL NIGHTLY
Status: DISCONTINUED | OUTPATIENT
Start: 2023-01-05 | End: 2023-01-06 | Stop reason: HOSPADM

## 2023-01-05 RX ORDER — METOCLOPRAMIDE HYDROCHLORIDE 5 MG/ML
5 INJECTION INTRAMUSCULAR; INTRAVENOUS EVERY 6 HOURS PRN
Status: DISCONTINUED | OUTPATIENT
Start: 2023-01-05 | End: 2023-01-06 | Stop reason: HOSPADM

## 2023-01-05 RX ORDER — FLUTICASONE PROPIONATE 50 MCG
1 SPRAY, SUSPENSION (ML) NASAL DAILY
Status: DISCONTINUED | OUTPATIENT
Start: 2023-01-05 | End: 2023-01-06 | Stop reason: HOSPADM

## 2023-01-05 RX ORDER — LACTOBACILLUS RHAMNOSUS GG 10B CELL
1 CAPSULE ORAL
Status: DISCONTINUED | OUTPATIENT
Start: 2023-01-06 | End: 2023-01-06 | Stop reason: HOSPADM

## 2023-01-05 RX ORDER — ALBUTEROL SULFATE 90 UG/1
2 AEROSOL, METERED RESPIRATORY (INHALATION) 4 TIMES DAILY
Status: DISCONTINUED | OUTPATIENT
Start: 2023-01-05 | End: 2023-01-06 | Stop reason: HOSPADM

## 2023-01-05 RX ORDER — KETOROLAC TROMETHAMINE 15 MG/ML
15 INJECTION, SOLUTION INTRAMUSCULAR; INTRAVENOUS ONCE
Status: COMPLETED | OUTPATIENT
Start: 2023-01-05 | End: 2023-01-05

## 2023-01-05 RX ORDER — CYCLOBENZAPRINE HCL 10 MG
5 TABLET ORAL
Status: COMPLETED | OUTPATIENT
Start: 2023-01-05 | End: 2023-01-06

## 2023-01-05 RX ORDER — CETIRIZINE HYDROCHLORIDE 10 MG/1
5 TABLET ORAL DAILY
Status: DISCONTINUED | OUTPATIENT
Start: 2023-01-05 | End: 2023-01-06 | Stop reason: HOSPADM

## 2023-01-05 RX ORDER — POLYETHYLENE GLYCOL 3350 17 G/17G
17 POWDER, FOR SOLUTION ORAL DAILY
Status: DISCONTINUED | OUTPATIENT
Start: 2023-01-06 | End: 2023-01-06 | Stop reason: HOSPADM

## 2023-01-05 RX ADMIN — OXYCODONE HYDROCHLORIDE AND ACETAMINOPHEN 1 TABLET: 5; 325 TABLET ORAL at 13:51

## 2023-01-05 RX ADMIN — Medication 81 MG: at 08:30

## 2023-01-05 RX ADMIN — ONDANSETRON 4 MG: 2 INJECTION INTRAMUSCULAR; INTRAVENOUS at 21:14

## 2023-01-05 RX ADMIN — GUAIFENESIN 600 MG: 600 TABLET, EXTENDED RELEASE ORAL at 08:30

## 2023-01-05 RX ADMIN — QUETIAPINE FUMARATE 50 MG: 100 TABLET ORAL at 08:29

## 2023-01-05 RX ADMIN — CLONAZEPAM 0.5 MG: 1 TABLET ORAL at 09:00

## 2023-01-05 RX ADMIN — TAMSULOSIN HYDROCHLORIDE 0.4 MG: 0.4 CAPSULE ORAL at 10:53

## 2023-01-05 RX ADMIN — SODIUM CHLORIDE, PRESERVATIVE FREE 10 ML: 5 INJECTION INTRAVENOUS at 21:10

## 2023-01-05 RX ADMIN — ALBUTEROL SULFATE 2 PUFF: 90 AEROSOL, METERED RESPIRATORY (INHALATION) at 15:30

## 2023-01-05 RX ADMIN — BENZONATATE 200 MG: 100 CAPSULE ORAL at 04:56

## 2023-01-05 RX ADMIN — ONDANSETRON 4 MG: 2 INJECTION INTRAMUSCULAR; INTRAVENOUS at 13:52

## 2023-01-05 RX ADMIN — BUSPIRONE HYDROCHLORIDE 10 MG: 10 TABLET ORAL at 08:30

## 2023-01-05 RX ADMIN — AMITRIPTYLINE HYDROCHLORIDE 25 MG: 25 TABLET, FILM COATED ORAL at 21:07

## 2023-01-05 RX ADMIN — CETIRIZINE HYDROCHLORIDE 5 MG: 10 TABLET ORAL at 13:50

## 2023-01-05 RX ADMIN — GUAIFENESIN 600 MG: 600 TABLET, EXTENDED RELEASE ORAL at 21:08

## 2023-01-05 RX ADMIN — ENOXAPARIN SODIUM 30 MG: 100 INJECTION SUBCUTANEOUS at 08:29

## 2023-01-05 RX ADMIN — ESCITALOPRAM OXALATE 20 MG: 10 TABLET ORAL at 08:30

## 2023-01-05 RX ADMIN — AMITRIPTYLINE HYDROCHLORIDE 25 MG: 25 TABLET, FILM COATED ORAL at 08:30

## 2023-01-05 RX ADMIN — GABAPENTIN 300 MG: 300 CAPSULE ORAL at 08:30

## 2023-01-05 RX ADMIN — SODIUM CHLORIDE 500 ML: 9 INJECTION, SOLUTION INTRAVENOUS at 00:42

## 2023-01-05 RX ADMIN — TRAZODONE HYDROCHLORIDE 100 MG: 100 TABLET ORAL at 21:07

## 2023-01-05 RX ADMIN — ATORVASTATIN CALCIUM 40 MG: 80 TABLET, FILM COATED ORAL at 21:07

## 2023-01-05 RX ADMIN — CLONAZEPAM 0.5 MG: 1 TABLET ORAL at 15:28

## 2023-01-05 RX ADMIN — ENOXAPARIN SODIUM 30 MG: 100 INJECTION SUBCUTANEOUS at 21:08

## 2023-01-05 RX ADMIN — QUETIAPINE FUMARATE 100 MG: 100 TABLET ORAL at 21:07

## 2023-01-05 RX ADMIN — SODIUM CHLORIDE, PRESERVATIVE FREE 40 MG: 5 INJECTION INTRAVENOUS at 08:29

## 2023-01-05 RX ADMIN — METOPROLOL SUCCINATE 12.5 MG: 25 TABLET, FILM COATED, EXTENDED RELEASE ORAL at 10:54

## 2023-01-05 RX ADMIN — ACETAMINOPHEN 650 MG: 325 TABLET ORAL at 09:24

## 2023-01-05 RX ADMIN — AMITRIPTYLINE HYDROCHLORIDE 25 MG: 25 TABLET, FILM COATED ORAL at 15:28

## 2023-01-05 RX ADMIN — OXYCODONE HYDROCHLORIDE AND ACETAMINOPHEN 1 TABLET: 5; 325 TABLET ORAL at 05:34

## 2023-01-05 RX ADMIN — SODIUM CHLORIDE, PRESERVATIVE FREE 40 MG: 5 INJECTION INTRAVENOUS at 21:09

## 2023-01-05 RX ADMIN — BUSPIRONE HYDROCHLORIDE 10 MG: 10 TABLET ORAL at 21:07

## 2023-01-05 RX ADMIN — GABAPENTIN 300 MG: 300 CAPSULE ORAL at 15:29

## 2023-01-05 RX ADMIN — ONDANSETRON 4 MG: 2 INJECTION INTRAMUSCULAR; INTRAVENOUS at 08:22

## 2023-01-05 RX ADMIN — METOCLOPRAMIDE 5 MG: 5 INJECTION, SOLUTION INTRAMUSCULAR; INTRAVENOUS at 21:08

## 2023-01-05 RX ADMIN — CLONAZEPAM 0.5 MG: 1 TABLET ORAL at 21:06

## 2023-01-05 RX ADMIN — KETOROLAC TROMETHAMINE 15 MG: 15 INJECTION, SOLUTION INTRAMUSCULAR; INTRAVENOUS at 21:04

## 2023-01-05 RX ADMIN — ALBUTEROL SULFATE 2 PUFF: 90 AEROSOL, METERED RESPIRATORY (INHALATION) at 21:55

## 2023-01-05 RX ADMIN — REMDESIVIR 100 MG: 100 INJECTION, POWDER, LYOPHILIZED, FOR SOLUTION INTRAVENOUS at 21:06

## 2023-01-05 RX ADMIN — DOCUSATE SODIUM 50 MG AND SENNOSIDES 8.6 MG 2 TABLET: 8.6; 5 TABLET, FILM COATED ORAL at 21:14

## 2023-01-05 RX ADMIN — FLUTICASONE PROPIONATE 1 SPRAY: 50 SPRAY, METERED NASAL at 15:30

## 2023-01-05 RX ADMIN — GABAPENTIN 300 MG: 300 CAPSULE ORAL at 21:07

## 2023-01-05 RX ADMIN — SODIUM CHLORIDE, PRESERVATIVE FREE 10 ML: 5 INJECTION INTRAVENOUS at 10:53

## 2023-01-05 ASSESSMENT — PAIN SCALES - GENERAL
PAINLEVEL_OUTOF10: 8
PAINLEVEL_OUTOF10: 8

## 2023-01-05 ASSESSMENT — PAIN DESCRIPTION - LOCATION: LOCATION: ABDOMEN;KNEE

## 2023-01-05 NOTE — CARE COORDINATION
Transitional planning-called Luis Eduardo([phone]) at [facility] can return when ready. O2 5L per N/C,.  Isolate until 1-

## 2023-01-05 NOTE — PROGRESS NOTES
[facility] [address] 19 Patient  Today's Date and Time: 1/5/2023, 9:41 AM    Impression :     COVID 19 Confirmed Infection  Covid tests:  1/1/23: Detected  Vaccination status:  Patient received 3 doses of Judithann Headings in 2021. Overdue for Covid booster since 2/19/22. Current infection will count as a 4th dose of vaccine  She would still need a Bivalent booster in April 2023  Acute hypoxic respiratory distress  Elevated CRP  Fall from wheelchair  Paroxysmal Afib  HTN  GERD  COPD  Solitary left kidney  Chronic abdominal pain  Paraplegia  Wheel-chair bound  Chronic gastritis  Anxiety  Hx MDRO and ESBL  PCN allergy  DNR-CCA    Recommendations:   Antibiotic treatment:  Monitor off antibiotics   Covid Rx:    Remdesivir-Requested 1-2-23. Stop date 1-6-23. Decadron-Contraindicated as patient has a steroid allergy  Actemra-Not indicated at this time  Paxlovid-ordered 1/2/22 but she has too many drug interactions  Molnupiravir would be an option, if problems with remdesivir  Monitor CRP      Medical Decision Making/Summary/Discussion:1/5/2023     Patient admitted with COVID 19 infection    Infection Control Recommendations   [place] Precautions  Contact-Hx ESBL and MDRO UTI  Airborne isolation  Droplet Isolation until 1/11/22    Antimicrobial Stewardship Recommendations     Discontinuation of therapy    Coordination of Outpatient Care:   Estimated Length of IV antimicrobials:NA  Patient will need Midline Catheter Insertion: No  Patient will need PICC line Insertion: No  Patient will need: Home IV , [place],  SNF,  LTAC:No  Patient will need outpatient wound care:No    Chief complaint/reason for consultation:   Concern for COVID infection      History of Present Illness:   Carol Ann Deleon is a 68y.o.-year-old female who was initially admitted on 1/1/2023.  Patient seen at the request of Dr. Lillis Kussmaul:    Patient, with a complicated medical history, presented through ER with complaints of a fall from her wheelchair when trying to get into bed. She denied hitting her head and fell on her left side. She had just tested positive for Covid at her ECF and is experiencing some shortness of breath with nausea and vomiting. The patient's vital signs are stable other than hypoxia requiring 4 L NC.   CXR showed no acute process but CT revealed developing atelectasis/infiltrate in the RLL. No other acute abnormalities were noted on imaging studies. Sputum culture is growing GPCC and GNR. IV vancomycin and cipro were initiated pending finalized culture results. The patient has a PCN allergy. Paxlovid was initiated for Covid. RADIOLOGY:  CT ABDOMEN PELVIS WO CONTRAST Additional Contrast? None   Final Result   Mild atelectasis or developing infiltrate in the right base. Small bilateral   pleural effusions. Mild hepatosplenomegaly and fatty infiltration of the liver       Status post right nephrectomy, cholecystectomy and hysterectomy       Large hiatal hernia           XR CHEST PORTABLE   Final Result   No acute process. XR SHOULDER LEFT (MIN 2 VIEWS)   Final Result   Mild to moderate osteoarthritis of the left knee. No acute bony abnormalities are noted of the bilateral hips, left shoulder   and left knee. Total right hip arthropasty without acute hardware complication. XR KNEE LEFT (3 VIEWS)   Final Result   Mild to moderate osteoarthritis of the left knee. No acute bony abnormalities are noted of the bilateral hips, left shoulder   and left knee. Total right hip arthropasty without acute hardware complication. XR HIP W PELVIS MIN 5 VWS BILATERAL   Final Result   Mild to moderate osteoarthritis of the left knee. No acute bony abnormalities are noted of the bilateral hips, left shoulder   and left knee. Total right hip arthropasty without acute hardware complication.            CT HEAD WO CONTRAST   Final Result   Mild central and cortical cerebral atrophy. No acute intracranial abnormalities are noted. Patient admitted because of concerns with COVID 19.    CURRENT EVALUATION : 1/5/2023  BP (!) 138/49   Pulse 85   Temp 98.8 °F (37.1 °C) (Oral)   Resp 13   Ht 5' 2\" (1.575 m)   Wt 175 lb 0.7 oz (79.4 kg)   SpO2 98%   BMI 32.02 kg/m²     Afebrile  VS stable    The patient is requiring 5 L NC today. She had been complaining of abdominal discomfort and constipation, which is likely related to narcotic use as well as decreased mobility. KUB showed non-specific air filled bowel loops. She is receiving Reglan, and was given an enema. PRN dulcolax and glycolax are ordered as well    CRP is trending down nicely. She is receiving Remdesivir    She was requiring straight cath for urinary retention and a Saldana catheter was placed on 1/4/23. She received a 500 ml fluid bolus on 1/4/23 for hypotension    1/5/22 Repeat CXR and BNP ordered    Patient exhibiting respiratory distress. yes  Respiratory secretions: yes    Patient receiving supplemental oxygen: 4-->3-->1-->5 L NC  RR:16-->23-->15-->17-->16  02 sat:96-->96-->95-->95    QTc: 437    NEWS Score: 0-4 Low risk group; 5-6: Medium risk group; 7 or above: High risk group  Parameters 3 2 1 0 1 2 3   Age    < 65   ? 65   RR ? 8  9-11 12-20  21-24 ? 25   O2 Sats ?  91 92-93 94-95 ? 96      Suppl O2  Yes  No      SBP ? 90  101-110 111-219   ? 220   HR ? 40  41-50 51-90  111-130 ? 131   Consciousness    Alert   Drowsiness, lethargy, or confusion   Temperature ? 35.0 C (95.0 F)  35.1-36.0 C 95.1-96.9 F 36.1-38.0 C 97.0-100.4 F 38.1-39.0 C 100.5-102.3 F ? 39.1 C ? 102.4 F      NEWS Score:  1/2/22: 5 moderate risk for ICU    Overall Daily Picture:   worsened    Presence of secondary bacterial Infection:  No  Additional antibiotics: No    Labs, X rays reviewed: 1/5/2023    BUN:22-->23->19-->15  Cr:0.98-->0.80-->0.82-->0.79    WBC:6.5-->5.7-->6.2  Hb:15.1-->12.5-->12.2  Plat: 127-->153-->126    Absolute Neutrophils:5.5  Absolute Lymphocytes:0.72  Neutrophil/Lymphocyte Ratio: 7.6 high risk for worsening viral illness    CRP:151.5-->66.6-->53.9  Ferritin:171  LDH: 180    Cultures:  Urine:  1/4/22: No bacteria  Blood:  1/1/23: No growth thus far  Sputum :  1/1/23: Normal teresa heavy growth  MRSA Nares:  1/2/23: Not detected    IMAGING:    CXR:   1/1/23 7/31/22      CAT:  1/1/23      Discussed with patient, RN, CC, IM. I have personally reviewed the past medical history, past surgical history, medications, social history, and family history, and I have updated the database accordingly.   Past Medical History:     Past Medical History:   Diagnosis Date    A-fib ([facility] [address]     Acquired absence of kidney     Adult hypertrophic pyloric stenosis     Agoraphobia with panic disorder     Agoraphobia without history of panic disorder     Allergic rhinitis     Anemia, unspecified     Anxiety     Anxiety disorder     Arthritis     Atrial fibrillation (HCC)     Back pain     Bronchitis     Caffeine use     8 coffee / day    Cardiomegaly     Carpal tunnel syndrome     Cataracts, bilateral     Centriacinar emphysema (HCC)     Chronic kidney disease, stage III (moderate) (HCC)     Chronic pain     Constipation     Constipation     COPD (chronic obstructive pulmonary disease) (HCC)     Emphysema    Cystitis with hematuria     Depression     Diaphragmatic hernia without obstruction or gangrene     Diarrhea     Difficulty walking     Dry eye syndrome of unspecified lacrimal gland     Esophageal obstruction     FOM (frequency of micturition)     Foot drop, right foot     Gastro-esophageal reflux disease with esophagitis, with bleeding     GERD (gastroesophageal reflux disease)     H/O gastric ulcer     HTN (hypertension)     Hyperlipidemia Hypertension, essential     Klebsiella pneumoniae (k. pneumoniae) as the cause of diseases classified elsewhere     Major depression, recurrent (HCC)     Mitral valve prolapse     Mixed hyperlipidemia     Mumps     Muscle weakness (generalized)     MVP (mitral valve prolapse)     Dr. Janice Mckenna in May 2019    Old myocardial infarction     Personal history of disease of digestive system     Psychophysiological insomnia     Recurrent UTI     Dr. Jama Milan    S/P PICC central line placement 08/05/2022    \"no longer present\" per Nancy Roberts (Nurse) at [facility] of [place]. Sepsis ([address]     Sinusitis     Tracheostomy in place [facility])     \"No longer present\" per Nancy Roberts (Nurse) at [facility] of [place].     Ulcerative esophagitis     Urinary tract infection, site not specified     Uses wheelchair     Wears glasses     Wellness examination     Dr. Rigoberto Ledezma seen in Jan 2020       Past Surgical  History:     Past Surgical History:   Procedure Laterality Date    APPENDECTOMY      CARPAL TUNNEL RELEASE      CHOLECYSTECTOMY, LAPAROSCOPIC N/A 05/22/2020    XI LAPAROSCOPIC ROBOTIC CHOLECYSTECTOMY, PYLOROPLASTY performed by Zoe Aguirre MD at [address] [place] [address]      COLONOSCOPY N/A 1/28/2022    COLONOSCOPY POLYPECTOMY HOT performed by Estiven Guzman MD at [place] [address]      ERCP  05/21/2020    with stent insertion, balloon dilation, sphinctereotomy    ERCP  05/21/2020    ** CASE IN OR WITY GI STAFF** ERCP STENT INSERTION performed by Estiven Guzman MD at [facility]    ERCP  05/21/2020    ** CASE IN OR WITH GI STAFF**ERCP SPHINCTER/PAPILLOTOMY performed by Estiven [facility] MD at [facility]    ERCP  05/21/2020    ** CASE IN OR WITH GI STAFF**ERCP DILATION BALLOON performed by Estiven Guzman MD at [facility]    ERCP N/A 2/8/2021    ERCP STENT REMOVAL performed by Julianna Villar MD at [facility]    ERCP  2/8/2021    ERCP STONE REMOVAL performed by Julianna Villar MD at [address] [place] [address] IOL    GASTRIC FUNDOPLICATION N/A 56/57/3582    XI LAPAROSCOPIC ROBOTIC HIATAL HERNIA REPAIR, CHERYL FUNDOPLICATION performed by Cheryl Chan MD at [address] [facility] 03/27/2020    EGD PEG TUBE PLACEMENT performed by Cheryl Chan MD at [address] [facility] N/A 2/8/2021    **CASE IN O.R. W/ GI STAFF - EGD WITH REMOVAL PEG TUBE performed by Ishaan Blanco MD at [facility]    HAND SURGERY      [facility] CATH POWER PICC TRIPLE  03/04/2020         HERNIA REPAIR      Hiatal hernia    HYSTERECTOMY (CERVIX STATUS UNKNOWN)      Abdominal    IR NONTUNNELED VASCULAR CATHETER  5/10/2022    IR NONTUNNELED VASCULAR CATHETER 5/10/2022 Ivis Antonio MD [facility] SPECIAL PROCEDURES    JOINT REPLACEMENT Right     right hip    OVARY REMOVAL      RHINOPLASTY      TONSILLECTOMY      TOTAL HIP ARTHROPLASTY      TOTAL NEPHRECTOMY      atrophic from infections, age 13    TRACHEOSTOMY N/A 03/27/2020    **ADD ON **WANTS 10:00AM **TRACHEOTOMY performed by Cheryl Chan MD at [facility] N/A 04/02/2020    TRACHEOTOMY EXCHANGE performed by Cheryl Chan MD at [facility] 03/17/2020    BEDSIDE EGD ESOPHAGOGASTRODUODENOSCOPY (ICU) performed by Cheryl Chan MD at [facility] 1 N/A 05/18/2020    EGD BIOPSY performed by Trevor Emerson MD at [place] [address]  05/18/2020    EGD DILATION BALLOON performed by Trevor Emersno MD at [facility] 1 N/A 07/06/2020    ** CASE IN O.R. WITH GI STAFF** EGD ESOPHAGOGASTRODUODENOSCOPY performed by Genoveva Donaldson MD at [facility] 1 11/09/2020    EGD DIAGNOSTIC ONLY performed by Radha Blanco MD at [facility] 1  02/08/2021    - EGD WITH REMOVAL PEG TUBE,ERCP STENT REMOVAL,ERCP STONE REMOVAL    UPPER GASTROINTESTINAL ENDOSCOPY N/A 11/24/2021    EGD BIOPSY performed by Iwona Cardona MD at [place] [address] N/A 9/8/2022    EGD BIOPSY performed by Arthur Ortega MD at [address] [place] [address] 10/11/2022    EGD BIOPSY performed by Arthur Ortega MD at [facility]       Medications:      metoprolol succinate  12.5 mg Oral Daily    enoxaparin  30 mg SubCUTAneous BID    tamsulosin  0.4 mg Oral Daily    amitriptyline  25 mg Oral TID    busPIRone  10 mg Oral BID    escitalopram  20 mg Oral Daily    gabapentin  300 mg Oral TID    QUEtiapine  50 mg Oral Daily    QUEtiapine  100 mg Oral Nightly    traZODone  100 mg Oral Nightly    atorvastatin  40 mg Oral Daily    aspirin  81 mg Oral Daily    guaiFENesin  600 mg Oral BID    pantoprazole (PROTONIX) 40 mg injection  40 mg IntraVENous Q12H    clonazePAM  0.5 mg Oral TID    remdesivir IVPB  100 mg IntraVENous Q24H    sodium chloride flush  5-40 mL IntraVENous 2 times per day    [Held by provider] dexamethasone  6 mg IntraVENous Q24H       Social History:     Social History     Socioeconomic History    Marital status:       Spouse name: Not on file    Number of children: 2    Years of education: Not on file    Highest education level: Not on file   Occupational History    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   Tobacco Use    Smoking status: Former     Packs/day: 1.00     Years: 50.00     Pack years: 50.00     Types: Cigarettes    Smokeless tobacco: Never    Tobacco comments:     quit 1 year ago    Vaping Use    Vaping Use: Former    Substances: Always   Substance and Sexual Activity    Alcohol use: No    Drug use: No    Sexual activity: Never   Other Topics Concern    Not on file   Social History Narrative    Not on file     Social Determinants of Health     Financial Resource Strain: Not on file   Food Insecurity: Not on file   Transportation Needs: Not on file   Physical Activity: Not on file   Stress: Not on file   Social Connections: Not on file   Intimate Partner Violence: Not on file   Housing Stability: Not on file       Family History:     Family History   Problem Relation Age of Onset    Cancer Mother         uterine    Heart Attack Mother     Heart Attack Father     Other Maternal Grandfather         foot gangrene    Other Paternal Grandmother         bleeding ulcers    Other Paternal Grandfather         lung cancer        Allergies:   Prednisone, Compazine [prochlorperazine maleate], Penicillin v potassium, and Penicillins     Review of Systems:       Constitutional: No fevers or chills. No systemic complaints  Head: No headaches  Eyes: No double vision or blurry vision. No conjunctival inflammation. ENT: No sore throat or runny nose. . No hearing loss, tinnitus or vertigo. Cardiovascular: No chest pain or palpitations. Shortness of breath. [place]  Lung: Shortness of breath with cough. sputum production  Abdomen: No nausea, vomiting, diarrhea, or abdominal pain. Sallyanne Chain No cramps. Genitourinary: No increased urinary frequency, or dysuria. No hematuria. No suprapubic or CVA pain  Musculoskeletal: generalized soreness of left side of body s/p fall  Hematologic: No bleeding or bruising. Neurologic: No headache, weakness, numbness, or tingling. Integument: No rash, no ulcers. Psychiatric: anxiety. Endocrine: No polyuria, no polydipsia, no polyphagia. Physical Examination :   Patient Vitals for the past 8 hrs:   BP Temp Temp src Pulse Resp SpO2   01/05/23 0843 (!) 138/49 98.8 °F (37.1 °C) Oral 85 13 98 %   01/05/23 0600 123/64 -- -- 79 21 94 %   01/05/23 0534 -- -- -- -- 18 --   01/05/23 0500 128/76 -- -- 82 15 97 %   01/05/23 0400 (!) 121/48 -- -- 78 22 94 %   01/05/23 0300 108/77 98.2 °F (36.8 °C) Oral 75 23 95 %   01/05/23 0200 (!) 98/49 -- -- 75 22 95 %       General Appearance: Awake, alert, and anxious  Head:  Normocephalic, no trauma  Eyes: Pupils equal, round, reactive to light; sclera anicteric; conjunctivae pink.  No embolic phenomena. ENT: Oropharynx clear, without erythema, exudate, or thrush. No tenderness of sinuses. Mouth/throat: mucosa pink and moist. No lesions. Dentition in good repair. Neck:Supple, without lymphadenopathy. Thyroid normal, No bruits. Pulmonary/Chest: Clear to auscultation, without wheezes, rales, or rhonchi. No dullness to percussion. Cardiovascular: Regular rate and rhythm without murmurs, rubs, or gallops. Abdomen: Soft, non tender. Bowel sounds normal. No organomegaly  All four Extremities: No cyanosis, clubbing, edema, or effusions. wheelchair bound  Neurologic: No gross sensory or motor deficits. Skin: Warm and dry with good turgor. No signs of peripheral arterial or venous insufficiency. No ulcerations. No open wounds. Medical Decision Making -Laboratory:   I have independently reviewed/ordered the following labs:    CBC with Differential:   Recent Labs     01/04/23  1021 01/05/23  0620   WBC 5.7 6.2   HGB 12.5 12.2   HCT 40.1 40.5   PLT See Reflexed IPF Result See Reflexed IPF Result       BMP:   Recent Labs     01/04/23  1021 01/05/23  0620    136   K 3.8 4.0    103   CO2 21 23   BUN 19 15   CREATININE 0.82 0.79       Hepatic Function Panel:   Recent Labs     01/04/23  1021   PROT 5.8*   LABALBU 3.1*   BILIDIR 0.1   IBILI 0.2   BILITOT 0.3   ALKPHOS 89   ALT 12   AST 13       No results for input(s): RPR in the last 72 hours. No results for input(s): HIV in the last 72 hours. No results for input(s): BC in the last 72 hours.   Lab Results   Component Value Date/Time    MUCUS NOT REPORTED 12/18/2021 12:26 PM    RBC 4.08 01/05/2023 06:20 AM    TRICHOMONAS NOT REPORTED 12/18/2021 12:26 PM    WBC 6.2 01/05/2023 06:20 AM    YEAST NOT REPORTED 12/18/2021 12:26 PM    TURBIDITY Clear 01/02/2023 12:09 AM     Lab Results   Component Value Date/Time    CREATININE 0.79 01/05/2023 06:20 AM    GLUCOSE 103 01/05/2023 06:20 AM       Medical Decision Making-Imaging:     Narrative EXAMINATION:   ONE XRAY VIEW OF THE CHEST       1/2/2023 8:16 am       COMPARISON:   January 1, 2023       HISTORY:   ORDERING SYSTEM PROVIDED HISTORY: AECOPD   TECHNOLOGIST PROVIDED HISTORY:   AECOPD   Reason for Exam: port upright       FINDINGS:   Stable cardiomediastinal silhouette. No significant pulmonary edema. No   convincing lung consolidation or infiltrate. No sizable effusion. No   pneumothorax. Impression   No acute cardiopulmonary process     Narrative   EXAMINATION:   CT OF THE ABDOMEN AND PELVIS WITHOUT CONTRAST 1/1/2023 3:21 pm       TECHNIQUE:   CT of the abdomen and pelvis was performed without the administration of   intravenous contrast. Multiplanar reformatted images are provided for review. Automated exposure control, iterative reconstruction, and/or weight based   adjustment of the mA/kV was utilized to reduce the radiation dose to as low   as reasonably achievable. COMPARISON:   08/17/2022       HISTORY:   ORDERING SYSTEM PROVIDED HISTORY: abdominal pain   TECHNOLOGIST PROVIDED HISTORY:   abdominal pain       Decision Support Exception - unselect if not a suspected or confirmed   emergency medical condition->Emergency Medical Condition (MA)       FINDINGS:   Lower Chest: Mild atelectasis or developing infiltrate in the right base. Small bilateral pleural effusions. Organs: Liver is mildly enlarged in size with decreased density. No focal   masses identified. No evidence of intrahepatic ductal dilatation. Spleen   is mildly enlarged. The gallbladder is surgically absent. Both adrenal   glands are normal.  Pancreas is normal in appearance. Status post right   nephrectomy. Stable cyst in the left kidney. .  The left kidney is otherwise   normal in size and attenuation without evidence of hydronephrosis or renal   calculi. GI/Bowel: The visualized bowel and mesentery show no mass lesions. No   evidence of acute appendicitis.        Pelvis: Status post hysterectomy. Bladder and rectum are intact. Peritoneum/Retroperitoneum: No free fluid. No lymphadenopathy. No evidence of   pneumoperitoneum. Bones/Soft Tissues: Right hip prosthesis. .  The abdominal and pelvic walls   are unremarkable. Degenerative changes seen in the visualized spine. Scoliosis. No acute bony abnormalities. Vascular calcifications are seen   compatible with atherosclerotic disease. Impression   Mild atelectasis or developing infiltrate in the right base. Small bilateral   pleural effusions. Mild hepatosplenomegaly and fatty infiltration of the liver       Status post right nephrectomy, cholecystectomy and hysterectomy       Large hiatal hernia     Narrative   EXAMINATION:   CT OF THE HEAD WITHOUT CONTRAST  1/1/2023 2:11 pm       TECHNIQUE:   CT of the head was performed without the administration of intravenous   contrast. Automated exposure control, iterative reconstruction, and/or weight   based adjustment of the mA/kV was utilized to reduce the radiation dose to as   low as reasonably achievable. COMPARISON:   None. HISTORY:   ORDERING SYSTEM PROVIDED HISTORY: fall   TECHNOLOGIST PROVIDED HISTORY:       fall   Decision Support Exception - unselect if not a suspected or confirmed   emergency medical condition->Emergency Medical Condition (MA)       CT BRAIN FINDINGS:   BRAIN/VENTRICLES:       The cerebral hemispheres, brainstem, and cerebellum have a normal appearance   for the patient's age. The falx is midline. The ventricles and peripheral   sulci are mildly dilated. There is no sign of a space occupying lesion,   infarction, or hemorrhage. Orbits: Portion of the orbits demonstrate no acute abnormality. SINUSES: Mucoperiosteal thickening of the ethmoid sinuses. .  The remaining   imaged portions of the paranasal sinuses are clear. The mastoids and the   middle ear chambers are clear.        SOFT TISSUES/SKULL:  No acute abnormality of the visualized skull or soft   tissues. Vascular calcifications are seen compatible with atherosclerotic   disease. Impression   Mild central and cortical cerebral atrophy. No acute intracranial abnormalities are noted. Medical Decision Boipxx-Ajwavbvw-Oshgo:       Medical Decision Making-Other:     Note:  Labs, medications, radiologic studies were reviewed with personal review of films  Large amounts of data were reviewed  Discussed with nursing Staff, Discharge planner  Infection Control and Prevention measures reviewed  All prior entries were reviewed  Administer medications as ordered  Prognosis: [address]  Discharge planning reviewed      Thank you for allowing us to participate in the care of this patient. Please call with questions. MARGOTH Shah - CNP    ATTESTATION:    I have discussed the case, including pertinent history and exam findings with the APRN. I have evaluated the  History, physical findings and pictures of the patient and the key elements of the encounter have been performed by me. I have reviewed the laboratory data, other diagnostic studies and discussed them with the APRN. I have updated the medical record where necessary. I agree with the assessment, plan and orders as documented by the APRN.     Kelly Greco MD.        Pager: [phone] - Office: [phone]

## 2023-01-05 NOTE — PROGRESS NOTES
GALO MELGARPatient Assessment complete. COVID [U07.1]  COVID-19 [U07.1] . Vitals:    01/05/23 0843   BP: (!) 138/49   Pulse: 85   Resp: 13   Temp: 98.8 °F (37.1 °C)   SpO2: 98%   . Patients home meds are   Prior to Admission medications    Medication Sig Start Date End Date Taking? Authorizing Provider   amitriptyline (ELAVIL) 25 MG tablet Take 1 tablet by mouth in the morning, at noon, and at bedtime 12/27/22   Historical Provider, MD   fesoterodine (TOVIAZ) 4 MG TB24 ER tablet Take 1 tablet by mouth 12/5/22   Historical Provider, MD   gabapentin (NEURONTIN) 300 MG capsule Take 1 capsule by mouth in the morning, at noon, and at bedtime. 12/30/22   Historical Provider, MD   escitalopram (LEXAPRO) 20 MG tablet Take 1 tablet by mouth daily 12/5/22   Historical Provider, MD   QUEtiapine (SEROQUEL) 50 MG tablet Take 1 tablet by mouth daily In the morning 12/19/22   Historical Provider, MD   Sodium Phosphates (FLEET) 7-19 GM/118ML Place 1 enema rectally once as needed    Historical Provider, MD   aluminum & magnesium hydroxide-simethicone (MAALOX) 200-200-20 MG/5ML SUSP suspension Take 5 mLs by mouth as needed for Indigestion    Historical Provider, MD   traZODone (DESYREL) 100 MG tablet Take 100 mg by mouth nightly    Historical Provider, MD   ondansetron (ZOFRAN) 4 MG tablet Take 4 mg by mouth every 8 hours as needed for Nausea or Vomiting    Historical Provider, MD   albuterol (PROVENTIL) (2.5 MG/3ML) 0.083% nebulizer solution Take 2.5 mg by nebulization every 6 hours as needed for Wheezing    Historical Provider, MD   oxyCODONE-acetaminophen (PERCOCET) 5-325 MG per tablet Take 1 tablet by mouth every 8 hours as needed for Pain. Historical Provider, MD   carboxymethylcellulose 1 % ophthalmic solution Place 1 drop into both eyes in the morning and 1 drop at noon and 1 drop in the evening and 1 drop before bedtime.     Historical Provider, MD   magnesium hydroxide (MILK OF MAGNESIA) 400 MG/5ML suspension Take 30 mLs by mouth Give 30ml by mouth as needed for constipation no BM x3 days    Historical Provider, MD   acetaminophen (TYLENOL) 500 MG tablet Take 1,000 mg by mouth in the morning and at bedtime    Historical Provider, MD   Cholecalciferol (VITAMIN D) 50 MCG (2000 UT) CAPS capsule Take 1 capsule by mouth daily 5/10/22   Tanya [place] Go[place]    guaiFENesin (MUCINEX) 600 MG extended release tablet Take 400 mg by mouth in the morning. Indications: Cough. As needed for mucus. Historical Provider, MD   Lactobacillus TABS Take 1 tablet by mouth 2 times daily Take One tablet by mouth two times daily    Historical Provider, MD   fluticasone (FLONASE) 50 MCG/ACT nasal spray 1 spray by Each Nostril route daily    Historical Provider, MD   loratadine (CLARITIN) 10 MG capsule Take 10 mg by mouth daily    Historical Provider, MD   clonazePAM (KLONOPIN) 0.5 MG tablet Take 0.5 mg by mouth 3 times daily as needed (tid). Historical Provider, MD   metoclopramide (REGLAN) 5 MG tablet Take 1 tablet by mouth 4 times daily 1/4/22   Antoine Houser MD   polyethylene glycol (GLYCOLAX) 17 g packet Take 17 g by mouth daily as needed for Constipation 12/19/21   Cynthia Godwin MD   QUEtiapine (SEROQUEL) 100 MG tablet Take 1 tablet by mouth nightly for 3 days 12/19/21 1/2/23  Cynthia Godwin MD   Folic Acid-Cholecalciferol 1-2000 MG-UNIT TABS Take by mouth    Historical Provider, MD   busPIRone (BUSPAR) 10 MG tablet Take 1 tablet by mouth 2 times daily 11/30/21   aMtt Tapia MD   pantoprazole (PROTONIX) 40 MG tablet Take 1 tablet by mouth 2 times daily 11/25/21 10/11/22  Cynthia Godwin MD   bisacodyl (DULCOLAX) 10 MG suppository Place 10 mg rectally daily as needed for Constipation PRN for constipation no BM x4 days.     Historical Provider, MD   budesonide-formoterol (SYMBICORT) 160-4.5 MCG/ACT AERO Inhale 2 puffs into the lungs 2 times daily    Historical Provider, MD   ferrous sulfate (FE TABS 325) 325 (65 Fe) MG EC tablet Take 1 tablet by mouth daily (with breakfast) 12/16/20   MARGOTH Baker NP   metoprolol succinate (TOPROL XL) 25 MG extended release tablet Take 1 tablet by mouth daily 5/30/20   Sylvie Bernheim, MD   sucralfate (CARAFATE) 1 GM tablet Take 1 tablet by mouth 4 times daily 4/6/20   Rafael Sims MD   Oyster Shell 500 MG TABS Take 500 mg by mouth 2 times daily     Historical Provider, MD   simvastatin (ZOCOR) 40 MG tablet Take 40 mg by mouth nightly. Historical Provider, MD   .      Assessment   Patient states she doesn't wear home oxygen  Patient states she doesn't take any home treatments  Patient states she doesn't wear home CPAP machine    Breath Sounds:  Breath sounds clear/lower diminished.        Bronchodilator assessment at level  1  Hyperinflation assessment at level   Secretion Management assessment at level      [x]    Bronchodilator Assessment  BRONCHODILATOR ASSESSMENT SCORE  Score 0 1 2 3 4 5   Breath Sounds   []  Patient Baseline [x]  No Wheeze good aeration []  Faint, scattered wheezing, good aeration []  Expiratory Wheezing and or moderately diminished []  Insp/Exp wheeze and/or very diminished []  Insp/Exp and/ or marked distress   Respiratory Rate   []  Patient Baseline [x]  Less than 20 []  Less than 20 []  20-25 []  Greater than 25 []  Greater than 25   Peak flow % of Pred or PB [x]  NA   []  Greater than 90%  []  81-90% []  71-80% []  Less than or equal to 70%  or unable to perform []  Unable due to Respiratory Distress   Dyspnea re []  Patient Baseline [x]  No SOB []  No SOB []  SOB on exertion []  SOB min activity []  At rest/acute   e FEV% Predicted       [x]  NA []  Above 69%  []  Unable []  Above 60-69%  []  Unable []  Above 50-59%  []  Unable []  Above 35-49%  []  Unable []  Less than 35%  []  Unable

## 2023-01-05 NOTE — PROGRESS NOTES
[facility]  Office: [address], DO, Tierra Kaminski, DO, Alicia Dixon, DO, Ishaan Jeff Blood, DO, Jossy De La Fuente MD, Mary Ellen Hartmann MD, Danyell Montalvo MD, Shanelle Ovalle MD,  Fabio Segura MD, Jaz Mcfarlane MD, Roslyn Cortez, DO, Jaylon Murray MD,  Kathryn Veloz MD, Saud Montesinos MD, Ramon Ernst, DO, Alejandro Haile MD, Brandin Blackwell MD, Samm Christy DO, Vega Conrad MD, Joi Nagy MD, Salvador Baltazar MD, Florina Fallon MD, Shlomo Whitman, , Dyan Segura MD, Bertram Solitario MD, Bonnie Sutton, CNP,  Da Peñaloza, CNP, Caitlin Arzola, CNP, Sanam Villegas, CNP,  Gricel Pickens, [facility], Laina Villagran, CNP, Janay Perkins, CNP, Angelo Browne, CNP, Kennedy Granados, CNP, Maria Elena Carey, CNP, Ijeoma Fountain, PA-C, Colt Bauer, CNS, Fozia Christensen, CNP, Margaret Wolfe, [address] [place] [address]    Progress Note    1/5/2023    7:15 AM    Name:   Perla Bird  MRN:     [id number]     Acct:      [de-identified]   Room:   [address] [place] [address] Day:  4  Admit Date:  1/1/2023  4:20 PM    PCP:   Natalie Robles DO  Code Status:  DNR-CCA    Subjective:     C/C:   Chief Complaint   Patient presents with    Fall     Interval History Status: not changed. Evaluated in room, continues to endorse shortness of breath and reproducible chest pain with coughing. The coughing is making the patient nauseous with dry heaves. Try to wean off oxygen, patient still requiring 2 L low flow nasal cannula.   Abdominal pain persistent, which is not new   Brief History:     Perla Bird is a 68 y.o. 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 Status post fall earlier today while attempting to transfer from bed to wheelchair to bathroom, patient fell on the ground, denies LOC. Did not hit head. Patient does complain of flulike symptoms with worsening nausea vomiting right lower quadrant abdominal pain with shortness of breath, cough, throbbing headaches and worsening arthralgias/myalgias. Symptoms began 2 days prior to arrival with intermittent headaches, nausea sinus congestion ear fullness and cough . Cough is nonproductive . Denies pleurisy or hemoptysis. Describes dizziness /acute lightheadedness prior to falling with transferring out of bed. Review of Systems:     Constitutional:  negative for chills, fevers, sweats, endorses chronic bilateral lower extremity pain  Respiratory:  +cough, +dyspnea on exertion, +shortness of breath, wheezing  Cardiovascular:  negative for chest pain, chest pressure/discomfort, lower extremity edema, palpitations  Gastrointestinal:  + chronic abdominal pain per patient , constipation, diarrhea, nausea, vomiting  Neurological:  negative for dizziness, headache    Medications: Allergies:     Allergies   Allergen Reactions    Prednisone Anxiety    Compazine [Prochlorperazine Maleate]     Penicillin V Potassium     Penicillins Other (See Comments)     Shock- received meropenem and ceftriaxone wo issues 2020       Current Meds:   Scheduled Meds:    metoprolol succinate  12.5 mg Oral Daily    enoxaparin  30 mg SubCUTAneous BID    tamsulosin  0.4 mg Oral Daily    amitriptyline  25 mg Oral TID    busPIRone  10 mg Oral BID    escitalopram  20 mg Oral Daily    gabapentin  300 mg Oral TID    QUEtiapine  50 mg Oral Daily    QUEtiapine  100 mg Oral Nightly    traZODone  100 mg Oral Nightly    atorvastatin  40 mg Oral Daily    aspirin  81 mg Oral Daily    guaiFENesin  600 mg Oral BID    pantoprazole (PROTONIX) 40 mg injection  40 mg IntraVENous Q12H    clonazePAM  0.5 mg Oral TID    remdesivir IVPB  100 mg IntraVENous Q24H    sodium chloride flush  5-40 mL IntraVENous 2 times per day    [Held by provider] dexamethasone  6 mg IntraVENous Q24H     Continuous Infusions:    sodium chloride 20 mL/hr at 01/03/23 2224     PRN Meds: metoclopramide, bisacodyl, benzonatate, ipratropium-albuterol, oxyCODONE-acetaminophen, sodium chloride, sodium chloride flush, sodium chloride, ondansetron **OR** ondansetron, polyethylene glycol, albuterol, acetaminophen **OR** acetaminophen    Data:     Past Medical History:   has a past medical history of A-fib ([facility] [address] Acquired absence of kidney, Adult hypertrophic pyloric stenosis, Agoraphobia with panic disorder, Agoraphobia without history of panic disorder, Allergic rhinitis, Anemia, unspecified, Anxiety, Anxiety disorder, Arthritis, Atrial fibrillation (HCC), Back pain, Bronchitis, Caffeine use, Cardiomegaly, Carpal tunnel syndrome, Cataracts, bilateral, Centriacinar emphysema ([facility] [address] Chronic kidney disease, stage III (moderate) (HCC), Chronic pain, Constipation, Constipation, COPD (chronic obstructive pulmonary disease) ([facility] [address] Cystitis with hematuria, Depression, Diaphragmatic hernia without obstruction or gangrene, Diarrhea, Difficulty walking, Dry eye syndrome of unspecified lacrimal gland, Esophageal obstruction, FOM (frequency of micturition), Foot drop, right foot, Gastro-esophageal reflux disease with esophagitis, with bleeding, GERD (gastroesophageal reflux disease), H/O gastric ulcer, HTN (hypertension), Hyperlipidemia, Hypertension, essential, Klebsiella pneumoniae (k. pneumoniae) as the cause of diseases classified elsewhere, Major depression, recurrent ([address] Mitral valve prolapse, Mixed hyperlipidemia, Mumps, Muscle weakness (generalized), MVP (mitral valve prolapse), Old myocardial infarction, Personal history of disease of digestive system, Psychophysiological insomnia, Recurrent UTI, S/P PICC central line placement, Sepsis ([address] Sinusitis, Tracheostomy in place ([address] Ulcerative esophagitis, Urinary tract infection, site not specified, Uses wheelchair, Wears glasses, and Wellness examination. Social History:   reports that she has quit smoking. Her smoking use included cigarettes. She has a 50.00 pack-year smoking history. She has never used smokeless tobacco. She reports that she does not drink alcohol and does not use drugs. Family History:   Family History   Problem Relation Age of Onset    Cancer Mother         uterine    Heart Attack Mother     Heart Attack Father     Other Maternal Grandfather         foot gangrene    Other Paternal Grandmother         bleeding ulcers    Other Paternal Grandfather         lung cancer       Vitals:  /64   Pulse 79   Temp 98.2 °F (36.8 °C) (Oral)   Resp 21   Ht 5' 2\" (1.575 m)   Wt 175 lb 0.7 oz (79.4 kg)   SpO2 94%   BMI 32.02 kg/m²   Temp (24hrs), Av.3 °F (36.8 °C), Min:98 °F (36.7 °C), Max:98.8 °F (37.1 °C)    Recent Labs     23  1136 23  0749 23  1121 23  1529   POCGLU 121* 102 107* 101       I/O (24Hr):     Intake/Output Summary (Last 24 hours) at 2023 0715  Last data filed at 2023 0507  Gross per 24 hour   Intake 450 ml   Output 1050 ml   Net -600 ml       Labs:  Hematology:  Recent Labs     23  0522 23  1021 23  0620   WBC  --  5.7 6.2   RBC  --  4.14 4.08   HGB  --  12.5 12.2   HCT  --  40.1 40.5   MCV  --  96.9 99.3   MCH  --  30.2 29.9   MCHC  --  31.2 30.1   RDW  --  14.4 14.2   PLT  --  See Reflexed IPF Result See Reflexed IPF Result   CRP 66.6* 53.9*  --      Chemistry:  Recent Labs     23  0522 23  1021    136   K 4.1 3.8    102   CO2 24 21   GLUCOSE 100* 135*   BUN 23 19   CREATININE 0.80 0.82   ANIONGAP 13 13   LABGLOM >60 >60   CALCIUM 8.5* 8.5*     Recent Labs     23  1136 23  0749 23  1021 23  1121 23  1529   PROT  --   --  5.8*  --   --    LABALBU  --   --  3.1*  --   --    AST  --   --  13 --   --    ALT  --   --  12  --   --    ALKPHOS  --   --  89  --   --    BILITOT  --   --  0.3  --   --    BILIDIR  --   --  0.1  --   --    LIPASE  --   --  19  --   --    POCGLU 121* 102  --  107* 101     ABG:  Lab Results   Component Value Date/Time    POCPH 7.437 05/30/2020 05:41 AM    POCPCO2 42.3 05/30/2020 05:41 AM    POCPO2 129.9 05/30/2020 05:41 AM    POCHCO3 28.6 05/30/2020 05:41 AM    NBEA NOT REPORTED 05/30/2020 05:41 AM    PBEA 4 05/30/2020 05:41 AM    ZIN6OLF 30 05/30/2020 05:41 AM    JJDP3WEO 99 05/30/2020 05:41 AM    FIO2 NOT REPORTED 11/23/2021 09:01 PM     Lab Results   Component Value Date/Time    SPECIAL LT FOREARM 10ML 01/01/2023 05:05 PM     Lab Results   Component Value Date/Time    CULTURE NO GROWTH 3 DAYS 01/01/2023 05:05 PM       Radiology:  CT ABDOMEN PELVIS WO CONTRAST Additional Contrast? None    Result Date: 1/1/2023  Mild atelectasis or developing infiltrate in the right base. Small bilateral pleural effusions. Mild hepatosplenomegaly and fatty infiltration of the liver Status post right nephrectomy, cholecystectomy and hysterectomy Large hiatal hernia     XR KNEE LEFT (3 VIEWS)    Result Date: 1/1/2023  Mild to moderate osteoarthritis of the left knee. No acute bony abnormalities are noted of the bilateral hips, left shoulder and left knee. Total right hip arthropasty without acute hardware complication. CT HEAD WO CONTRAST    Result Date: 1/1/2023  Mild central and cortical cerebral atrophy. No acute intracranial abnormalities are noted. XR SHOULDER LEFT (MIN 2 VIEWS)    Result Date: 1/1/2023  Mild to moderate osteoarthritis of the left knee. No acute bony abnormalities are noted of the bilateral hips, left shoulder and left knee. Total right hip arthropasty without acute hardware complication. XR CHEST PORTABLE    Result Date: 1/2/2023  No acute cardiopulmonary process     XR CHEST PORTABLE    Result Date: 1/1/2023  No acute process.      XR HIP W PELVIS MIN 5 VWS BILATERAL    Result Date: 1/1/2023  Mild to moderate osteoarthritis of the left knee. No acute bony abnormalities are noted of the bilateral hips, left shoulder and left knee. Total right hip arthropasty without acute hardware complication. Physical Examination:        General appearance:  alert, cooperative and no distress  Mental Status:  oriented to person, place and time and normal affect  Lungs:coarse rhonchi with shallow inspiratory effort and cough 2 LLFNC  Heart:  regular rate and rhythm, no murmur  Abdomen:  soft, nontender, nondistended, normal bowel sounds, no masses, hepatomegaly, splenomegaly  Extremities:  no edema, redness, tenderness in the calves  Skin:  no gross lesions, rashes, induration    Assessment:        Hospital Problems             Last Modified POA    * (Principal) COVID-19 1/1/2023 Yes    Intractable abdominal pain 1/1/2023 Yes    Acute on chronic respiratory failure with hypoxia (HCC) 1/1/2023 Yes    COVID 1/2/2023 Yes    Hypoxia 1/2/2023 Yes    Elevated C-reactive protein (CRP) 1/2/2023 Yes    Chronic obstructive pulmonary disease ([address] 1/2/2023 Yes    Acute respiratory failure with hypoxia (HCC) 1/2/2023 Yes    GERD (gastroesophageal reflux disease) 1/1/2023 Yes    Overview Signed 12/18/2021  7:10 AM by MARGOTH Trinidad NP     Last Assessment & Plan:   Formatting of this note might be different from the original.  S/p hydrostatic balloon dilation. Considering POEM  Formatting of this note might be different from the original.  Last Assessment & Plan:   S/p hydrostatic balloon dilation.  Considering POEM         Dysphagia 1/1/2023 Yes    Hiatal hernia 1/1/2023 Yes    History of repair of hiatal hernia 1/1/2023 Yes    Centrilobular emphysema ([address] 1/1/2023 Yes    H/O gastric ulcer 1/1/2023 Yes    Hyperlipidemia 1/1/2023 Yes    Essential hypertension 1/1/2023 Yes    S/P Nissen fundoplication (without gastrostomy tube) procedure 1/1/2023 Yes    Expressive aphasia 1/1/2023 Yes Chronic pain 1/1/2023 Yes    Stage 3 chronic kidney disease ([facility] [address] 1/1/2023 Yes    Difficulty walking 1/1/2023 Yes       Plan:        COVID-19 viral infection with elevated CRP: Infectious disease following. On remdesivir. Check chest x-ray today, start bronchodilator  Constipation: Continue bowel meds, abdominal x-ray reviewed without evidence of constipation on imaging  Acute on chronic hypoxic respiratory failure: Secondary to #1. Continue low-flow O2  Hypotension: 500 mL fluid bolus, decrease beta-blocker  Anxiety: Fluctuating, continue Seroquel, Lexapro, BuSpar, Elavil. CKD stage IIIa: Stable, creatinine at baseline.   Nausea with emesis: Continue antiemetics, encourage p.o. intake, continue Reglan, increased dose for the next 24 hours  Chronic paraplegia: Passive range of motion encouraged  GI/DVT prophylaxis: Protonix, Lovenox twice daily      MARGOTH Mcclendon NP  1/5/2023  7:15 AM

## 2023-01-06 VITALS
HEIGHT: 62 IN | BODY MASS INDEX: 33.47 KG/M2 | OXYGEN SATURATION: 96 % | RESPIRATION RATE: 24 BRPM | SYSTOLIC BLOOD PRESSURE: 106 MMHG | TEMPERATURE: 99.9 F | HEART RATE: 82 BPM | WEIGHT: 181.88 LBS | DIASTOLIC BLOOD PRESSURE: 51 MMHG

## 2023-01-06 LAB
ANION GAP SERPL CALCULATED.3IONS-SCNC: 8 MMOL/L (ref 9–17)
BUN BLDV-MCNC: 14 MG/DL (ref 8–23)
CALCIUM SERPL-MCNC: 8.6 MG/DL (ref 8.6–10.4)
CHLORIDE BLD-SCNC: 101 MMOL/L (ref 98–107)
CO2: 26 MMOL/L (ref 20–31)
CREAT SERPL-MCNC: 0.84 MG/DL (ref 0.5–0.9)
CULTURE: NORMAL
CULTURE: NORMAL
EKG ATRIAL RATE: 99 BPM
EKG P AXIS: 46 DEGREES
EKG P-R INTERVAL: 156 MS
EKG Q-T INTERVAL: 344 MS
EKG QRS DURATION: 70 MS
EKG QTC CALCULATION (BAZETT): 441 MS
EKG R AXIS: 34 DEGREES
EKG T AXIS: 38 DEGREES
EKG VENTRICULAR RATE: 99 BPM
GFR SERPL CREATININE-BSD FRML MDRD: >60 ML/MIN/1.73M2
GLUCOSE BLD-MCNC: 98 MG/DL (ref 70–99)
HCT VFR BLD CALC: 36.8 % (ref 36.3–47.1)
HEMOGLOBIN: 11.9 G/DL (ref 11.9–15.1)
Lab: NORMAL
Lab: NORMAL
MCH RBC QN AUTO: 30.1 PG (ref 25.2–33.5)
MCHC RBC AUTO-ENTMCNC: 32.3 G/DL (ref 28.4–34.8)
MCV RBC AUTO: 92.9 FL (ref 82.6–102.9)
NRBC AUTOMATED: 0 PER 100 WBC
PDW BLD-RTO: 13.9 % (ref 11.8–14.4)
PLATELET # BLD: NORMAL K/UL (ref 138–453)
PLATELET, FLUORESCENCE: 124 K/UL (ref 138–453)
PLATELET, IMMATURE FRACTION: 4.9 % (ref 1.1–10.3)
POTASSIUM SERPL-SCNC: 3.6 MMOL/L (ref 3.7–5.3)
RBC # BLD: 3.96 M/UL (ref 3.95–5.11)
SODIUM BLD-SCNC: 135 MMOL/L (ref 135–144)
SPECIMEN DESCRIPTION: NORMAL
SPECIMEN DESCRIPTION: NORMAL
WBC # BLD: 5.3 K/UL (ref 3.5–11.3)

## 2023-01-06 PROCEDURE — 85027 COMPLETE CBC AUTOMATED: CPT

## 2023-01-06 PROCEDURE — 6370000000 HC RX 637 (ALT 250 FOR IP): Performed by: INTERNAL MEDICINE

## 2023-01-06 PROCEDURE — 80048 BASIC METABOLIC PNL TOTAL CA: CPT

## 2023-01-06 PROCEDURE — 6360000002 HC RX W HCPCS: Performed by: INTERNAL MEDICINE

## 2023-01-06 PROCEDURE — 6370000000 HC RX 637 (ALT 250 FOR IP): Performed by: NURSE PRACTITIONER

## 2023-01-06 PROCEDURE — 2580000003 HC RX 258: Performed by: NURSE PRACTITIONER

## 2023-01-06 PROCEDURE — C9113 INJ PANTOPRAZOLE SODIUM, VIA: HCPCS | Performed by: INTERNAL MEDICINE

## 2023-01-06 PROCEDURE — 99232 SBSQ HOSP IP/OBS MODERATE 35: CPT | Performed by: INTERNAL MEDICINE

## 2023-01-06 PROCEDURE — 2700000000 HC OXYGEN THERAPY PER DAY

## 2023-01-06 PROCEDURE — 2580000003 HC RX 258: Performed by: INTERNAL MEDICINE

## 2023-01-06 PROCEDURE — 6360000002 HC RX W HCPCS: Performed by: NURSE PRACTITIONER

## 2023-01-06 PROCEDURE — 36415 COLL VENOUS BLD VENIPUNCTURE: CPT

## 2023-01-06 PROCEDURE — 85055 RETICULATED PLATELET ASSAY: CPT

## 2023-01-06 PROCEDURE — 94640 AIRWAY INHALATION TREATMENT: CPT

## 2023-01-06 PROCEDURE — 94761 N-INVAS EAR/PLS OXIMETRY MLT: CPT

## 2023-01-06 PROCEDURE — 99239 HOSP IP/OBS DSCHRG MGMT >30: CPT | Performed by: NURSE PRACTITIONER

## 2023-01-06 RX ORDER — OXYCODONE HYDROCHLORIDE AND ACETAMINOPHEN 5; 325 MG/1; MG/1
1 TABLET ORAL EVERY 8 HOURS PRN
Qty: 12 TABLET | Refills: 0 | Status: SHIPPED | OUTPATIENT
Start: 2023-01-06 | End: 2023-01-09

## 2023-01-06 RX ORDER — LEVOFLOXACIN 750 MG/1
750 TABLET ORAL DAILY
Status: DISCONTINUED | OUTPATIENT
Start: 2023-01-06 | End: 2023-01-06 | Stop reason: HOSPADM

## 2023-01-06 RX ORDER — BENZONATATE 200 MG/1
200 CAPSULE ORAL 3 TIMES DAILY PRN
DISCHARGE
Start: 2023-01-06 | End: 2023-01-13

## 2023-01-06 RX ORDER — LEVOFLOXACIN 750 MG/1
750 TABLET ORAL DAILY
Qty: 7 TABLET | Refills: 0 | DISCHARGE
Start: 2023-01-06 | End: 2023-01-13

## 2023-01-06 RX ADMIN — REMDESIVIR 100 MG: 100 INJECTION, POWDER, LYOPHILIZED, FOR SOLUTION INTRAVENOUS at 12:02

## 2023-01-06 RX ADMIN — AMITRIPTYLINE HYDROCHLORIDE 25 MG: 25 TABLET, FILM COATED ORAL at 08:41

## 2023-01-06 RX ADMIN — ACETAMINOPHEN 650 MG: 325 TABLET ORAL at 13:48

## 2023-01-06 RX ADMIN — POLYETHYLENE GLYCOL 3350 17 G: 17 POWDER, FOR SOLUTION ORAL at 08:36

## 2023-01-06 RX ADMIN — ESCITALOPRAM OXALATE 20 MG: 10 TABLET ORAL at 08:37

## 2023-01-06 RX ADMIN — ALBUTEROL SULFATE 2 PUFF: 90 AEROSOL, METERED RESPIRATORY (INHALATION) at 08:12

## 2023-01-06 RX ADMIN — SODIUM CHLORIDE, PRESERVATIVE FREE 40 MG: 5 INJECTION INTRAVENOUS at 08:42

## 2023-01-06 RX ADMIN — OXYCODONE HYDROCHLORIDE AND ACETAMINOPHEN 1 TABLET: 5; 325 TABLET ORAL at 08:36

## 2023-01-06 RX ADMIN — Medication 81 MG: at 08:38

## 2023-01-06 RX ADMIN — CYCLOBENZAPRINE 5 MG: 10 TABLET, FILM COATED ORAL at 04:05

## 2023-01-06 RX ADMIN — SODIUM CHLORIDE, PRESERVATIVE FREE 10 ML: 5 INJECTION INTRAVENOUS at 08:36

## 2023-01-06 RX ADMIN — CLONAZEPAM 0.5 MG: 1 TABLET ORAL at 08:36

## 2023-01-06 RX ADMIN — Medication 1 CAPSULE: at 08:43

## 2023-01-06 RX ADMIN — CETIRIZINE HYDROCHLORIDE 5 MG: 10 TABLET ORAL at 08:38

## 2023-01-06 RX ADMIN — GABAPENTIN 300 MG: 300 CAPSULE ORAL at 08:39

## 2023-01-06 RX ADMIN — ENOXAPARIN SODIUM 30 MG: 100 INJECTION SUBCUTANEOUS at 08:36

## 2023-01-06 RX ADMIN — ALBUTEROL SULFATE 2 PUFF: 90 AEROSOL, METERED RESPIRATORY (INHALATION) at 11:38

## 2023-01-06 RX ADMIN — GUAIFENESIN 600 MG: 600 TABLET, EXTENDED RELEASE ORAL at 08:36

## 2023-01-06 RX ADMIN — LEVOFLOXACIN 750 MG: 750 TABLET, FILM COATED ORAL at 11:59

## 2023-01-06 RX ADMIN — FLUTICASONE PROPIONATE 1 SPRAY: 50 SPRAY, METERED NASAL at 08:43

## 2023-01-06 RX ADMIN — QUETIAPINE FUMARATE 50 MG: 100 TABLET ORAL at 08:37

## 2023-01-06 RX ADMIN — TAMSULOSIN HYDROCHLORIDE 0.4 MG: 0.4 CAPSULE ORAL at 08:41

## 2023-01-06 RX ADMIN — METOPROLOL SUCCINATE 12.5 MG: 25 TABLET, FILM COATED, EXTENDED RELEASE ORAL at 08:41

## 2023-01-06 RX ADMIN — FERROUS SULFATE TAB EC 325 MG (65 MG FE EQUIVALENT) 325 MG: 325 (65 FE) TABLET DELAYED RESPONSE at 11:59

## 2023-01-06 RX ADMIN — METOCLOPRAMIDE 5 MG: 5 INJECTION, SOLUTION INTRAMUSCULAR; INTRAVENOUS at 08:42

## 2023-01-06 RX ADMIN — BUSPIRONE HYDROCHLORIDE 10 MG: 10 TABLET ORAL at 08:38

## 2023-01-06 ASSESSMENT — PAIN SCALES - GENERAL
PAINLEVEL_OUTOF10: 7
PAINLEVEL_OUTOF10: 7
PAINLEVEL_OUTOF10: 0
PAINLEVEL_OUTOF10: 3

## 2023-01-06 NOTE — SIGNIFICANT EVENT
Rapid response called, patient was reporting CP , during that time appeared to struggle with word finding. Hemodynamically stable with nml 02 sats, baseline neuro findings without any new focal deficits, Ao2, responding appropriately. Says  her cp hurts when she coughs, , discomfort is described as blt under breast region. EKG at bedside sub optimal due to artifact but no obviously acute ischemic ST changes. Limbs normothermic, no signs of immanent respiratory compromise. Will obtain troponin, investigate her baseline neuro status and if changed will obtain ct, UA (chu with cloudy sediment recently placed), non sedative opiate for  MSK chest wall pain. Nurse at bedside updated on plan. Critical care time spent 30 min thus far.                                                      [id number]

## 2023-01-06 NOTE — PROGRESS NOTES
[facility]  Office: [address], DO, Stefanie Bhakta, DO, Maxinejustin Mcneill, DO, Laura Jim Blood, DO, Jose Alfredo Bahena MD, Kristen Mari MD, Yvone Phalen, MD, Murali Liu MD,  Homer Hashimoto, MD, Senait Delatorre MD, Rubin Boone, DO, Starr Cowart MD,  Callie Devine MD, Sonal Frey MD, Yaya Pruett, DO, Jg Wang MD, Shae Maravilla MD, Rachel Osorio, DO, Valentín Spence MD, Daniel Haque MD, Blanka Damon MD, Dhara Zazueta MD, Terry Doran DO, Radha Stephens MD, José Gutierrez MD, Cara Tellez, CNP,  Melisa Potter, CNP, Leoncio Bradford, CNP, Molly Glover, CNP,  Ene Spencer, [facility], Gabrielle Conroy, CNP, Dex Moscoso, CNP, Ijeoma Mcclain, CNP, Brittany Yo, CNP, Kev Donnelly, CNP, Tali Rain, PAAlvaC, Marie Crowder, CNS, Chidi Mendoza, CNP, Boby Oates, CNP         Niurka Gamble [address]    Progress Note    1/6/2023    8:42 AM    Name:   Kimo Jackson  MRN:     [id number]     Acct:      [de-identified]   Room:   [address] [place] [address] Day:  5  Admit Date:  1/1/2023  4:20 PM    PCP:   Karren Closs, DO  Code Status:  DNR-CCA    Subjective:     C/C:   Chief Complaint   Patient presents with    Fall     Interval History Status: not changed. Patient seen in her room, there was a rapid response overnight secondary to reproducible chest pain which is at patient's baseline. Oxygen requirements continue to improve currently on 1 L low flow nasal cannula, last day of remdesivir to be completed today, started on Levaquin for pneumonia.   Stable for discharge, discussed with nursing    Brief History:     Kimo Jackson is a 68 y.o. 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 Status post fall earlier today while attempting to transfer from bed to wheelchair to bathroom, patient fell on the ground, denies LOC. Did not hit head. Patient does complain of flulike symptoms with worsening nausea vomiting right lower quadrant abdominal pain with shortness of breath, cough, throbbing headaches and worsening arthralgias/myalgias. Symptoms began 2 days prior to arrival with intermittent headaches, nausea sinus congestion ear fullness and cough . Cough is nonproductive . Denies pleurisy or hemoptysis. Describes dizziness /acute lightheadedness prior to falling with transferring out of bed. Review of Systems:     Constitutional:  negative for chills, fevers, sweats, endorses chronic bilateral lower extremity pain  Respiratory:  +cough, +dyspnea on exertion, +shortness of breath, wheezing  Cardiovascular:  negative for chest pain, chest pressure/discomfort, lower extremity edema, palpitations  Gastrointestinal:  + chronic abdominal pain, constipation, diarrhea, nausea, vomiting  Neurological:  negative for dizziness, headache    Medications: Allergies:     Allergies   Allergen Reactions    Prednisone Anxiety    Compazine [Prochlorperazine Maleate]     Penicillin V Potassium     Penicillins Other (See Comments)     Shock- received meropenem and ceftriaxone wo issues 2020       Current Meds:   Scheduled Meds:    albuterol sulfate HFA  2 puff Inhalation 4x daily    ferrous sulfate  325 mg Oral Daily with breakfast    fluticasone  1 spray Each Nostril Daily    cetirizine  5 mg Oral Daily    lactobacillus  1 capsule Oral Daily with breakfast    senna-docusate  2 tablet Oral Nightly    polyethylene glycol  17 g Oral Daily    metoprolol succinate  12.5 mg Oral Daily    enoxaparin  30 mg SubCUTAneous BID    tamsulosin  0.4 mg Oral Daily    amitriptyline  25 mg Oral TID    busPIRone  10 mg Oral BID    escitalopram 20 mg Oral Daily    gabapentin  300 mg Oral TID    QUEtiapine  50 mg Oral Daily    QUEtiapine  100 mg Oral Nightly    traZODone  100 mg Oral Nightly    atorvastatin  40 mg Oral Daily    aspirin  81 mg Oral Daily    guaiFENesin  600 mg Oral BID    pantoprazole (PROTONIX) 40 mg injection  40 mg IntraVENous Q12H    clonazePAM  0.5 mg Oral TID    remdesivir IVPB  100 mg IntraVENous Q24H    sodium chloride flush  5-40 mL IntraVENous 2 times per day    [Held by provider] dexamethasone  6 mg IntraVENous Q24H     Continuous Infusions:    sodium chloride 20 mL/hr at 01/03/23 2224     PRN Meds: metoclopramide, clonazePAM, bisacodyl, benzonatate, ipratropium-albuterol, oxyCODONE-acetaminophen, sodium chloride, sodium chloride flush, sodium chloride, ondansetron **OR** ondansetron, albuterol, acetaminophen **OR** acetaminophen    Data:     Past Medical History:   has a past medical history of A-fib ([facility] Acquired absence of kidney, Adult hypertrophic pyloric stenosis, Agoraphobia with panic disorder, Agoraphobia without history of panic disorder, Allergic rhinitis, Anemia, unspecified, Anxiety, Anxiety disorder, Arthritis, Atrial fibrillation (HCC), Back pain, Bronchitis, Caffeine use, Cardiomegaly, Carpal tunnel syndrome, Cataracts, bilateral, Centriacinar emphysema ([facility] Chronic kidney disease, stage III (moderate) (HCC), Chronic pain, Constipation, Constipation, COPD (chronic obstructive pulmonary disease) ([facility] Cystitis with hematuria, Depression, Diaphragmatic hernia without obstruction or gangrene, Diarrhea, Difficulty walking, Dry eye syndrome of unspecified lacrimal gland, Esophageal obstruction, FOM (frequency of micturition), Foot drop, right foot, Gastro-esophageal reflux disease with esophagitis, with bleeding, GERD (gastroesophageal reflux disease), H/O gastric ulcer, HTN (hypertension), Hyperlipidemia, Hypertension, essential, Klebsiella pneumoniae (k. pneumoniae) as the cause of diseases classified elsewhere, Major depression, recurrent ([facility] Mitral valve prolapse, Mixed hyperlipidemia, Mumps, Muscle weakness (generalized), MVP (mitral valve prolapse), Old myocardial infarction, Personal history of disease of digestive system, Psychophysiological insomnia, Recurrent UTI, S/P PICC central line placement, Sepsis ([facility] Sinusitis, Tracheostomy in place [facility]), Ulcerative esophagitis, Urinary tract infection, site not specified, Uses wheelchair, Wears glasses, and Wellness examination. Social History:   reports that she has quit smoking. Her smoking use included cigarettes. She has a 50.00 pack-year smoking history. She has never used smokeless tobacco. She reports that she does not drink alcohol and does not use drugs. Family History:   Family History   Problem Relation Age of Onset    Cancer Mother         uterine    Heart Attack Mother     Heart Attack Father     Other Maternal Grandfather         foot gangrene    Other Paternal Grandmother         bleeding ulcers    Other Paternal Grandfather         lung cancer       Vitals:  BP 97/68   Pulse 90   Temp 98 °F (36.7 °C) (Oral)   Resp 14   Ht 5' 2\" (1.575 m)   Wt 181 lb 14.1 oz (82.5 kg)   SpO2 99%   BMI 33.27 kg/m²   Temp (24hrs), Av.2 °F (37.3 °C), Min:98 °F (36.7 °C), Max:101.1 °F (38.4 °C)    Recent Labs     23  1136 23  0749 23  1121 23  1529   POCGLU 121* 102 107* 101       I/O (24Hr):     Intake/Output Summary (Last 24 hours) at 2023 0842  Last data filed at 2023 0406  Gross per 24 hour   Intake --   Output 1350 ml   Net -1350 ml       Labs:  Hematology:  Recent Labs     23  1021 23  0620 23  0426   WBC 5.7 6.2 5.3   RBC 4.14 4.08 3.96   HGB 12.5 12.2 11.9   HCT 40.1 40.5 36.8   MCV 96.9 99.3 92.9   MCH 30.2 29.9 30.1   MCHC 31.2 30.1 32.3   RDW 14.4 14.2 13.9   PLT See Reflexed IPF Result See Reflexed IPF Result See Reflexed IPF Result   CRP 53.9*  --   --      Chemistry:  Recent Labs 01/04/23  1021 01/05/23  0620 01/05/23 2043 01/06/23  0426    136  --  135   K 3.8 4.0  --  3.6*    103  --  101   CO2 21 23  --  26   GLUCOSE 135* 103*  --  98   BUN 19 15  --  14   CREATININE 0.82 0.79  --  0.84   ANIONGAP 13 10  --  8*   LABGLOM >60 >60  --  >60   CALCIUM 8.5* 8.3*  --  8.6   PROBNP  --  40  --   --    TROPHS  --   --  11  --      Recent Labs     01/03/23  1136 01/04/23  0749 01/04/23  1021 01/04/23  1121 01/04/23  1529   PROT  --   --  5.8*  --   --    LABALBU  --   --  3.1*  --   --    AST  --   --  13  --   --    ALT  --   --  12  --   --    ALKPHOS  --   --  89  --   --    BILITOT  --   --  0.3  --   --    BILIDIR  --   --  0.1  --   --    LIPASE  --   --  19  --   --    POCGLU 121* 102  --  107* 101     ABG:  Lab Results   Component Value Date/Time    POCPH 7.437 05/30/2020 05:41 AM    POCPCO2 42.3 05/30/2020 05:41 AM    POCPO2 129.9 05/30/2020 05:41 AM    POCHCO3 28.6 05/30/2020 05:41 AM    NBEA NOT REPORTED 05/30/2020 05:41 AM    PBEA 4 05/30/2020 05:41 AM    RUC2ZIQ 30 05/30/2020 05:41 AM    WRIQ6FPS 99 05/30/2020 05:41 AM    FIO2 NOT REPORTED 11/23/2021 09:01 PM     Lab Results   Component Value Date/Time    SPECIAL LT FOREARM 10ML 01/01/2023 05:05 PM     Lab Results   Component Value Date/Time    CULTURE NO GROWTH 4 DAYS 01/01/2023 05:05 PM       Radiology:  CT ABDOMEN PELVIS WO CONTRAST Additional Contrast? None    Result Date: 1/1/2023  Mild atelectasis or developing infiltrate in the right base. Small bilateral pleural effusions. Mild hepatosplenomegaly and fatty infiltration of the liver Status post right nephrectomy, cholecystectomy and hysterectomy Large hiatal hernia     XR KNEE LEFT (3 VIEWS)    Result Date: 1/1/2023  Mild to moderate osteoarthritis of the left knee. No acute bony abnormalities are noted of the bilateral hips, left shoulder and left knee. Total right hip arthropasty without acute hardware complication.      CT HEAD WO CONTRAST    Result Date: 1/1/2023  Mild central and cortical cerebral atrophy. No acute intracranial abnormalities are noted. XR SHOULDER LEFT (MIN 2 VIEWS)    Result Date: 1/1/2023  Mild to moderate osteoarthritis of the left knee. No acute bony abnormalities are noted of the bilateral hips, left shoulder and left knee. Total right hip arthropasty without acute hardware complication. XR CHEST PORTABLE    Result Date: 1/2/2023  No acute cardiopulmonary process     XR CHEST PORTABLE    Result Date: 1/1/2023  No acute process. XR HIP W PELVIS MIN 5 VWS BILATERAL    Result Date: 1/1/2023  Mild to moderate osteoarthritis of the left knee. No acute bony abnormalities are noted of the bilateral hips, left shoulder and left knee. Total right hip arthropasty without acute hardware complication.        Physical Examination:        General appearance:  alert, cooperative and no distress  Mental Status:  oriented to person, place and time and normal affect  Lungs: Clear to auscultation bilaterally A&P, diminished posteriorly shallow inspiratory effort and cough 1 LLFNC  Heart:  regular rate and rhythm, no murmur  Abdomen:  obese, soft, nontender, nondistended, normal bowel sounds, no masses, hepatomegaly, splenomegaly  Extremities:  no edema, redness, tenderness in the calves  Skin:  no gross lesions, rashes, induration    Assessment:        Hospital Problems             Last Modified POA    * (Principal) COVID-19 1/1/2023 Yes    Intractable abdominal pain 1/1/2023 Yes    Acute on chronic respiratory failure with hypoxia (HCC) 1/1/2023 Yes    COVID 1/2/2023 Yes    Hypoxia 1/2/2023 Yes    Elevated C-reactive protein (CRP) 1/2/2023 Yes    Chronic obstructive pulmonary disease ([address] 1/2/2023 Yes    Acute respiratory failure with hypoxia (HCC) 1/2/2023 Yes    GERD (gastroesophageal reflux disease) 1/1/2023 Yes    Overview Signed 12/18/2021  7:10 AM by Marylen Net, APRN - SREE     Last Assessment & Plan:   Formatting of this note might be different from the original.  S/p hydrostatic balloon dilation. Considering POEM  Formatting of this note might be different from the original.  Last Assessment & Plan:   S/p hydrostatic balloon dilation. Considering POEM         Dysphagia 1/1/2023 Yes    Hiatal hernia 1/1/2023 Yes    History of repair of hiatal hernia 1/1/2023 Yes    Centrilobular emphysema ([address] 1/1/2023 Yes    H/O gastric ulcer 1/1/2023 Yes    Hyperlipidemia 1/1/2023 Yes    Essential hypertension 1/1/2023 Yes    S/P Nissen fundoplication (without gastrostomy tube) procedure 1/1/2023 Yes    Expressive aphasia 1/1/2023 Yes    Chronic pain 1/1/2023 Yes    Stage 3 chronic kidney disease ([address] 1/1/2023 Yes    Difficulty walking 1/1/2023 Yes       Plan:        COVID-19 viral infection/PNA with elevated CRP: Infectious disease following. On remdesivir. bronchodilator. Levaquin at discharge  Constipation: Continue bowel meds, abdominal x-ray reviewed without evidence of constipation on imaging  Acute on chronic hypoxic respiratory failure: Secondary to #1. Continue low-flow O2  Hypotension: Resolved  Anxiety: Fluctuating, continue Seroquel, Lexapro, BuSpar, Elavil. CKD stage IIIa: Stable, creatinine at baseline. Nausea with emesis: Continue antiemetics, encourage p.o.   GI/DVT prophylaxis: Protonix, Lovenox twice daily      MARGOTH Jarquin NP  1/6/2023  8:42 AM

## 2023-01-06 NOTE — DISCHARGE INSTR - COC
Continuity of Care Form    Patient Name: Rachel Valverde   :  1945  MRN:  [id number]    Admit date:  2023  Discharge date:  2023    Code Status Order: DNR-CCA   Advance Directives:     Admitting Physician:  Filomena Diaz DO  PCP: Bebe Lagos DO    Discharging Nurse: [facility] Unit/Room#: 1956/2885-05  Discharging Unit Phone Number: [phone]    Emergency Contact:   Extended Emergency Contact Information  Primary Emergency Contact: Hamilton Ramirez  Home Phone: [phone]  Relation: Child    Past Surgical History:  Past Surgical History:   Procedure Laterality Date    APPENDECTOMY      CARPAL TUNNEL RELEASE      CHOLECYSTECTOMY, LAPAROSCOPIC N/A 2020    XI LAPAROSCOPIC ROBOTIC CHOLECYSTECTOMY, PYLOROPLASTY performed by Aman Earl MD at [address] [place] [address] N/A 2022    COLONOSCOPY POLYPECTOMY HOT performed by Ernesto Villar MD at [facility]      ERCP  2020    with stent insertion, balloon dilation, sphinctereotomy    ERCP  2020    ** CASE IN OR WITY GI STAFF** ERCP STENT INSERTION performed by Ernesto Villar MD at [facility]    ERCP  2020    ** CASE IN OR WITH GI STAFF**ERCP SPHINCTER/PAPILLOTOMY performed by Ernesto Villar MD at [facility]    ERCP  2020    ** CASE IN OR WITH GI STAFF**ERCP DILATION BALLOON performed by Ernesto Villar MD at [facility]    ERCP N/A 2021    ERCP STENT REMOVAL performed by Lakeisha Borja MD at [facility]    ERCP  2021    ERCP STONE REMOVAL performed by Lakeisha Borja MD at [facility]    GASTRIC FUNDOPLICATION N/A     XI LAPAROSCOPIC ROBOTIC [address], CHERYL FUNDOPLICATION performed by Aman Earl MD at [address] [facility] 2020    EGD PEG TUBE PLACEMENT performed by Aman Earl MD at [address] [facility] 2021    **CASE IN O.R. W/ GI STAFF - EGD WITH REMOVAL PEG TUBE performed by Wing Sahu MD at [facility]    HAND SURGERY      [facility] CATH POWER PICC TRIPLE  03/04/2020         HERNIA REPAIR      Hiatal hernia    HYSTERECTOMY (CERVIX STATUS UNKNOWN)      Abdominal    IR NONTUNNELED VASCULAR CATHETER  5/10/2022    IR NONTUNNELED VASCULAR CATHETER 5/10/2022 Jolanta Villanueva MD [facility] SPECIAL PROCEDURES    JOINT REPLACEMENT Right     right hip    OVARY REMOVAL      RHINOPLASTY      TONSILLECTOMY      TOTAL HIP ARTHROPLASTY      TOTAL NEPHRECTOMY      atrophic from infections, age 13    TRACHEOSTOMY N/A 03/27/2020    **ADD ON **WANTS 10:00AM **TRACHEOTOMY performed by Klaa Rogers MD at [address] [place] [address] N/A 04/02/2020    TRACHEOTOMY EXCHANGE performed by Kala Rogers MD at [place] [address]. 03/17/2020    BEDSIDE EGD ESOPHAGOGASTRODUODENOSCOPY (ICU) performed by Kala Rogers MD at [place] [address] 05/18/2020    EGD BIOPSY performed by Keagan Shetty MD at [place] [address]  05/18/2020    EGD DILATION BALLOON performed by Keagan Shetty MD at [place] [address] N/A 07/06/2020    ** CASE IN O.R. WITH GI STAFF** EGD ESOPHAGOGASTRODUODENOSCOPY performed by Nabil Lemus MD at [place] [address] 11/09/2020    EGD DIAGNOSTIC ONLY performed by Deng Hernandez MD at [place] [address]  02/08/2021    - EGD WITH REMOVAL PEG TUBE,ERCP STENT REMOVAL,ERCP STONE REMOVAL    UPPER GASTROINTESTINAL ENDOSCOPY N/A 11/24/2021    EGD BIOPSY performed by Deng Hernandez MD at [place] [address] 9/8/2022    EGD BIOPSY performed by Keagan Shetty MD at [address] [place] [address] 10/11/2022    EGD BIOPSY performed by Keagan Shetty MD at [facility]       Immunization History: Immunization History   Administered Date(s) Administered    COVID-19, MODERNA BLUE border, Primary or Immunocompromised, (age 12y+), IM, 100 mcg/0.5mL 01/07/2021, 02/04/2021, 12/25/2021       Active Problems:  Patient Active Problem List   Diagnosis Code    Frequency of urination R35.0    Backache M54.9    GERD (gastroesophageal reflux disease) K21.9    MVP (mitral valve prolapse) I34.1    Depression F32. 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    Gastric outlet obstruction K31.1    Recurrent UTI N39.0    Tracheostomy in place ([facility] Z93.0    H/O gastric ulcer Z87.11    Hyperlipidemia E78.5    Essential hypertension I10    Tobacco abuse Z72.0    Chronic respiratory failure with hypoxia ([facility]) J96.11    S/P percutaneous endoscopic gastrostomy (PEG) tube placement ([facility]) Z93.1    S/P Nissen fundoplication (without gastrostomy tube) procedure Z98.890    Anemia due to blood loss D50.0    Phlebitis after infusion T80. 1XXA, I80.9    Esophagitis determined by endoscopy K20.90    Expressive aphasia R47.01    Transient speech disturbance R47.9    Speech disturbance R47.9    Coffee ground emesis K92.0    Acute cystitis without hematuria N30.00    Ulcerative esophagitis K22.10    Septicemia ([facility]) A41.9    Lower abdominal pain R10.30    Ileus ([facility]) K56.7    Chronic pain G89.29    Dyspnea on exertion R06.09    Orthostatic hypotension I95.1    Stage 3 chronic kidney disease (HCC) N18.30    Difficulty walking R26.2    Class 1 obesity in adult E66.9    Generalized abdominal pain R10.84    Multifocal pneumonia J18.9    Other constipation K59.09    Intractable abdominal pain R10.9    ESBL (extended spectrum beta-lactamase) producing bacteria infection A49.9, Z16.12 Complicated UTI (urinary tract infection) N39.0    COVID-19 U07.1    Acute on chronic respiratory failure with hypoxia ([facility]) J96.21    COVID U07.1    Hypoxia R09.02    Elevated C-reactive protein (CRP) R79.82    Chronic obstructive pulmonary disease (HCC) J44.9    Acute respiratory failure with hypoxia ([facility]) J96.01       Isolation/Infection:   Isolation            Droplet Plus  Contact          Patient Infection Status       Infection Onset Added Last Indicated Last Indicated By Review Planned Expiration Resolved Resolved By    COVID-19 01/01/23 01/01/23 01/01/23 COVID-19, Rapid 01/11/23 01/15/23      ESBL (Extended Spectrum Beta Lactamase) 12/13/20 12/15/20 07/31/22 Culture, Urine        Klebsiella urine 7/2022      MDRO (multi-drug resistant organism) 12/13/20 12/15/20 07/31/22 Culture, Urine        Proteus Urine 2/2021  Klebsiella Urine 7/2022    Resolved    COVID-19 (Rule Out) 01/01/23 01/01/23 01/01/23 COVID-19, Rapid (Ordered)   01/01/23 Rule-Out Test Resulted            Nurse Assessment:  Last Vital Signs: /71   Pulse 90   Temp 98 °F (36.7 °C) (Oral)   Resp 14   Ht 5' 2\" (1.575 m)   Wt 181 lb 14.1 oz (82.5 kg)   SpO2 99%   BMI 33.27 kg/m²     Last documented pain score (0-10 scale): Pain Level: 7  Last Weight:   Wt Readings from Last 1 Encounters:   01/06/23 181 lb 14.1 oz (82.5 kg)     Mental Status:  oriented and alert    IV Access:  - None    Nursing Mobility/ADLs:  Walking   Assisted  Transfer  Assisted  Bathing  Assisted  Dressing  Assisted  Toileting  Assisted  Feeding  Assisted  Med Admin  Assisted  Med Delivery   whole and prefers mixed with pudding    Wound Care Documentation and Therapy:        Elimination:  Continence: Bowel: No  Bladder: chu  Urinary Catheter:  Insertion Date: 1/3/2023 and Indication for Use of Catheter: Acute urinary retention/obstruction   Colostomy/Ileostomy/Ileal Conduit: No       Date of Last BM: 1/3/2023    Intake/Output Summary (Last 24 hours) at 1/6/2023 1000  Last data filed at 1/6/2023 0406  Gross per 24 hour   Intake --   Output 1350 ml   Net -1350 ml     I/O last 3 completed shifts:  In: -   Out: 2000 [Urine:2000]    Safety Concerns: At Risk for Falls    Impairments/Disabilities:      None    Nutrition Therapy:  Current Nutrition Therapy:   - Oral Diet:  General    Routes of Feeding: Oral  Liquids: Thin Liquids  Daily Fluid Restriction: no  Last Modified Barium Swallow with Video (Video Swallowing Test): not done    Treatments at the Time of Hospital Discharge:   Respiratory Treatments: supplemental oxygen 2-4liters  Oxygen Therapy:  is on oxygen at 2-4 L/min per nasal cannula.   Ventilator:    - No ventilator support    Rehab Therapies: Physical Therapy and Occupational Therapy  Weight Bearing Status/Restrictions: No weight bearing restrictions  Other Medical Equipment (for information only, NOT a DME order):  wheelchair, walker, and hospital bed  Other Treatments: breathing treatments as ordered by provider    Patient's personal belongings (please select all that are sent with patient):  None    RN SIGNATURE:  Electronically signed by Dina Mcclain RN on 1/6/23 at 10:59 AM EST    CASE MANAGEMENT/SOCIAL WORK SECTION    Inpatient Status Date: 1-1-2023    Readmission Risk Assessment Score:  Readmission Risk              Risk of Unplanned Readmission:  26           Discharging to Facility/ Agency   Name: [facility]  Address:  Phone:  Fax:    Dialysis Facility (if applicable)   Name:  Address:  Dialysis Schedule:  Phone:  Fax:    / signature: Electronically signed by Ana Gore RN on 1/6/23 at 11:26 AM EST    PHYSICIAN SECTION    Prognosis: Good    Condition at Discharge: Stable    Rehab Potential (if transferring to Rehab): Good    Recommended Labs or Other Treatments After Discharge: pt ot isolation for an additional 5 days, supplemental oxygen 1 to 2 L as needed for O2 saturations less than 24%    Physician Certification: I certify the above information and transfer of Christel Dale  is necessary for the continuing treatment of the diagnosis listed and that she requires Bob Shelbyuel for greater 30 days.      Update Admission H&P: Changes in H&P as follows - see discharge summary    PHYSICIAN SIGNATURE:  Electronically signed by Benny Hoyos DO on 1/6/23 at 10:01 AM EST

## 2023-01-06 NOTE — PROGRESS NOTES
Report called to [facility] at [facility], all questions answered. Number provided incase there are any questions.    Electronically signed by Lisa Branch RN on 1/6/2023 at 2:04 PM

## 2023-01-06 NOTE — PROGRESS NOTES
[address] [facility] 83  PROGRESS NOTE    Shift date: 01/05/2023  Shift day: Thursday   Shift # 2    Room # 2137/7426-60   Name: Christel Dale                L[employer] day: [employer]    Referral: Rapid Response    Admit Date & Time: 1/1/2023  4:20 PM    Assessment:  Christel Dale is a 68 y.o. female in the hospital. Writer observes the patient's curtain pulled closed and only necessary medical staff entering the patient's room. Intervention:  The  verified there were not family members with the patient and remained present until given the clear by medical staff. Outcome:  N/A    Plan:  Chaplains will remain available to offer spiritual and emotional support as needed.      01/05/23 2031   Encounter Summary   Service Provided For: Patient not available   Referral/Consult From: Multi-disciplinary team   Support System Unknown   Last Encounter  01/05/23   Complexity of Encounter Low   Begin Time 2015   End Time  2020   Total Time Calculated 5 min   Crisis   Type Rapid Response   Assessment/Intervention/Outcome   Assessment Unable to assess   Intervention Sustaining Presence/Ministry of presence   Outcome Other (comment)  (pt not available)       Electronically signed by Chantell Barr Resident, on 1/5/2023 at 8:32 PM.  Bob Miller  [phone]

## 2023-01-06 NOTE — CARE COORDINATION
Transitional planning-set up transportation with [facility] for 230-300PM. Talked with Margarita. 1123 notified Jordyn Garvin at [facility]. [id number] notified son Princess Martin. 1150 faxed AVS to [facility].     1210 call from [facility]-need to change time to 2PM.

## 2023-01-06 NOTE — PROGRESS NOTES
Patient discharged to [facility], all belongings sent with patient.    Electronically signed by Gay Calderon RN on 1/6/2023 at 3:38 PM

## 2023-01-06 NOTE — PLAN OF CARE
Problem: Discharge Planning  Goal: Discharge to home or other facility with appropriate resources  1/6/2023 1356 by Sapna Smith RN  Outcome: Adequate for Discharge     Problem: Skin/Tissue Integrity  Goal: Absence of new skin breakdown  Description: 1. Monitor for areas of redness and/or skin breakdown  2. Assess vascular access sites hourly  3. Every 4-6 hours minimum:  Change oxygen saturation probe site  4. Every 4-6 hours:  If on nasal continuous positive airway pressure, respiratory therapy assess nares and determine need for appliance change or resting period. 1/6/2023 1356 by Sapna Smith RN  Outcome: Adequate for Discharge     Problem: ABCDS Injury Assessment  Goal: Absence of physical injury  1/6/2023 1356 by Sapna Smith RN  Outcome: Adequate for Discharge     Problem: Safety - Adult  Goal: Free from fall injury  1/6/2023 1356 by Sapna Smith RN  Outcome: Adequate for Discharge     Problem: Pain  Goal: Verbalizes/displays adequate comfort level or baseline comfort level  1/6/2023 1356 by Sapna Smith RN  Outcome: Adequate for Discharge     Problem: Respiratory - Adult  Goal: Achieves optimal ventilation and oxygenation  1/6/2023 1356 by Sapna Smith RN  Outcome: Adequate for Discharge   . es

## 2023-01-06 NOTE — SIGNIFICANT EVENT
Critical care responded to patient bedside for rapid response. Patient was complaining of some chest pain that occurred after a long bout of coughing; patient admitted with Covid-19. Nurse reported patient had had word finding difficulty several minutes prior, but was now back to baseline. NIH 0. Patient awake, alert, oriented, speaking in full sentences. No focal neuro deficits. EKG obtained at bedside was unchanged from prior, no ST elevations or depressions, no T wave abnormalities. Vitals signs within normal limits. Troponin ordered. Intermed admitting service at bedside and will continue workup of patient. Patient does not require ICU intervention or admission at this time. Please call or page the critical care team with any worsening status changes.     Caryn Estes MD

## 2023-01-06 NOTE — PROGRESS NOTES
[facility] [address] [address] Patient  Today's Date and Time: 1/6/2023, 10:05 AM    Impression :     COVID 19 Confirmed Infection  Covid tests:  1/1/23: Detected  Vaccination status:  Patient received 3 doses of Aviva Pluck in 2021. Overdue for Covid booster since 2/19/22. Current infection will count as a 4th dose of vaccine  She would still need a Bivalent booster in April 2023  Acute hypoxic respiratory distress  Elevated CRP  Fall from wheelchair  Paroxysmal Afib  HTN  GERD  COPD  Solitary left kidney  Chronic abdominal pain  Paraplegia  Wheel-chair bound  Chronic gastritis  Anxiety  Hx MDRO and ESBL  PCN allergy  DNR-CCA    Recommendations:   Antibiotic treatment:  1/6/22: PO Levaquin 750 mg/day x 7 days for concerns of LLQ pneumonia  Covid Rx:    Completed Remdesivir-Requested 1-2-23. Stop date 1-6-23. Decadron-Contraindicated as patient has a steroid allergy  Actemra-Not indicated at this time  Paxlovid-ordered 1/2/22 but she has too many drug interactions  Molnupiravir would be an option, if problems with remdesivir  Monitor CRP      Medical Decision Making/Summary/Discussion:1/6/2023     Patient admitted with COVID 19 infection    Infection Control Recommendations   [place] Precautions  Contact-Hx ESBL and MDRO UTI  Airborne isolation  Droplet Isolation until 1/11/22    Antimicrobial Stewardship Recommendations     Discontinuation of therapy    Coordination of Outpatient Care:   Estimated Length of IV antimicrobials:NA  Patient will need Midline Catheter Insertion: No  Patient will need PICC line Insertion: No  Patient will need: Home IV , [place],  SNF,  LTAC:No  Patient will need outpatient wound care:No    Chief complaint/reason for consultation:   Concern for COVID infection      History of Present Illness:   López Viveros is a 68y.o.-year-old female who was initially admitted on 1/1/2023.  Patient seen at the request of Dr. Thompson York HISTORY:    Patient, with a complicated medical history, presented through ER with complaints of a fall from her wheelchair when trying to get into bed. She denied hitting her head and fell on her left side. She had just tested positive for Covid at her ECF and is experiencing some shortness of breath with nausea and vomiting. The patient's vital signs are stable other than hypoxia requiring 4 L NC.   CXR showed no acute process but CT revealed developing atelectasis/infiltrate in the RLL. No other acute abnormalities were noted on imaging studies. Sputum culture is growing GPCC and GNR. IV vancomycin and cipro were initiated pending finalized culture results. The patient has a PCN allergy. Paxlovid was initiated for Covid. RADIOLOGY:  CT ABDOMEN PELVIS WO CONTRAST Additional Contrast? None   Final Result   Mild atelectasis or developing infiltrate in the right base. Small bilateral   pleural effusions. Mild hepatosplenomegaly and fatty infiltration of the liver       Status post right nephrectomy, cholecystectomy and hysterectomy       Large hiatal hernia           XR CHEST PORTABLE   Final Result   No acute process. XR SHOULDER LEFT (MIN 2 VIEWS)   Final Result   Mild to moderate osteoarthritis of the left knee. No acute bony abnormalities are noted of the bilateral hips, left shoulder   and left knee. Total right hip arthropasty without acute hardware complication. XR KNEE LEFT (3 VIEWS)   Final Result   Mild to moderate osteoarthritis of the left knee. No acute bony abnormalities are noted of the bilateral hips, left shoulder   and left knee. Total right hip arthropasty without acute hardware complication. XR HIP W PELVIS MIN 5 VWS BILATERAL   Final Result   Mild to moderate osteoarthritis of the left knee. No acute bony abnormalities are noted of the bilateral hips, left shoulder   and left knee.        Total right hip arthropasty without acute hardware complication. CT HEAD WO CONTRAST   Final Result   Mild central and cortical cerebral atrophy. No acute intracranial abnormalities are noted. Patient admitted because of concerns with COVID 19.    CURRENT EVALUATION : 1/6/2023  /71   Pulse 90   Temp 98 °F (36.7 °C) (Oral)   Resp 14   Ht 5' 2\" (1.575 m)   Wt 181 lb 14.1 oz (82.5 kg)   SpO2 99%   BMI 33.27 kg/m²     Afebrile  VS stable    Patient stable  No new issues per RN    She had been complaining of abdominal discomfort and constipation, which is likely related to narcotic use as well as decreased mobility. KUB showed non-specific air filled bowel loops. She is receiving Reglan, and was given an enema. PRN dulcolax and glycolax are ordered as well    CRP is trending down nicely. She completed Remdesivir    She was requiring straight cath for urinary retention and a Saldana catheter was placed on 1/4/23.      1/5/22 Repeat CXR shows concern for LLL atelectasis vs pneumonia  1/6/22 PO Levaquin 750 mg daily x 7 days initiated    Patient exhibiting respiratory distress. yes  Respiratory secretions: yes    Patient receiving supplemental oxygen: 4-->3-->1-->5-->3 L NC  RR: 17-->16-->22  02 sat: 95-->95-->93    QTc: 437    NEWS Score: 0-4 Low risk group; 5-6: Medium risk group; 7 or above: High risk group  Parameters 3 2 1 0 1 2 3   Age    < 65   ? 65   RR ? 8  9-11 12-20  21-24 ? 25   O2 Sats ?  91 92-93 94-95 ? 96      Suppl O2  Yes  No      SBP ? 90  101-110 111-219   ? 220   HR ? 40  41-50 51-90  111-130 ? 131   Consciousness    Alert   Drowsiness, lethargy, or confusion   Temperature ? 35.0 C (95.0 F)  35.1-36.0 C 95.1-96.9 F 36.1-38.0 C 97.0-100.4 F 38.1-39.0 C 100.5-102.3 F ? 39.1 C ? 102.4 F      NEWS Score:  1/2/22: 5 moderate risk for ICU    Overall Daily Picture:   Unchanged    Presence of secondary bacterial Infection:  No  Additional antibiotics: No    Labs, X rays reviewed: 1/6/2023    BUN:23->19-->15-->14  Cr: 0.82-->0.79-->0.84    WBC:6.5-->5.7-->6.2-->5.3  Hb:15.1-->12.5-->12.2-->11.9  Plat: 127-->153-->126-->124    Absolute Neutrophils:5.5  Absolute Lymphocytes:0.72  Neutrophil/Lymphocyte Ratio: 7.6 high risk for worsening viral illness    CRP:151.5-->66.6-->53.9  Ferritin:171  LDH: 180    Cultures:  Urine:  1/4/22: No bacteria  Blood:  1/1/23: No growth thus far  Sputum :  1/1/23: Normal teresa heavy growth  MRSA Nares:  1/2/23: Not detected    IMAGING:    CXR:     1/6/23 1/1/23 7/31/22      CAT:  1/1/23      Discussed with patient, RN, CC, IM. I have personally reviewed the past medical history, past surgical history, medications, social history, and family history, and I have updated the database accordingly.   Past Medical History:     Past Medical History:   Diagnosis Date    [facility]fib ([facility]     Acquired absence of kidney     Adult hypertrophic pyloric stenosis     Agoraphobia with panic disorder     Agoraphobia without history of panic disorder     Allergic rhinitis     Anemia, unspecified     Anxiety     Anxiety disorder     Arthritis     Atrial fibrillation (HCC)     Back pain     Bronchitis     Caffeine use     8 coffee / day    Cardiomegaly     Carpal tunnel syndrome     Cataracts, bilateral     Centriacinar emphysema (HCC)     Chronic kidney disease, stage III (moderate) (HCC)     Chronic pain     Constipation     Constipation     COPD (chronic obstructive pulmonary disease) (HCC)     Emphysema    Cystitis with hematuria     Depression     Diaphragmatic hernia without obstruction or gangrene     Diarrhea     Difficulty walking     Dry eye syndrome of unspecified lacrimal gland     Esophageal obstruction     FOM (frequency of micturition)     Foot drop, right foot     Gastro-esophageal reflux disease with esophagitis, with bleeding     GERD (gastroesophageal reflux disease)     H/O gastric ulcer HTN (hypertension)     Hyperlipidemia     Hypertension, essential     Klebsiella pneumoniae (k. pneumoniae) as the cause of diseases classified elsewhere     Major depression, recurrent (HCC)     Mitral valve prolapse     Mixed hyperlipidemia     Mumps     Muscle weakness (generalized)     MVP (mitral valve prolapse)     Dr. Saud Robertson in May 2019    Old myocardial infarction     Personal history of disease of digestive system     Psychophysiological insomnia     Recurrent UTI     Dr. Ashley Kent    S/P PICC central line placement 08/05/2022    \"no longer present\" per Mariana Haynes (Nurse) at [facility] of [place]. Sepsis ([address]     Sinusitis     Tracheostomy in place [facility])     \"No longer present\" per Mariana Haynes (Nurse) at [facility] of [place].     Ulcerative esophagitis     Urinary tract infection, site not specified     Uses wheelchair     Wears glasses     Wellness examination     Dr. Marlene Murrell seen in Jan 2020       Past Surgical  History:     Past Surgical History:   Procedure Laterality Date    APPENDECTOMY      CARPAL TUNNEL RELEASE      CHOLECYSTECTOMY, LAPAROSCOPIC N/A 05/22/2020    XI LAPAROSCOPIC ROBOTIC CHOLECYSTECTOMY, PYLOROPLASTY [facility] [address] [facility] [place]      COLONOSCOPY N/A 1/28/2022    COLONOSCOPY POLYPECTOMY HOT performed by Eulalia Zaragoza MD at [place] [address]      ERCP  05/21/2020    with stent insertion, balloon dilation, sphinctereotomy    ERCP  05/21/2020    ** CASE IN OR WITY GI STAFF** ERCP STENT INSERTION performed by Eulalia Zaragoza MD at [facility]    ERCP  05/21/2020    ** CASE IN OR WITH GI STAFF**ERCP SPHINCTER/PAPILLOTOMY performed by Eulalia Zaragoza MD at [facility]    ERCP  05/21/2020    ** CASE IN OR WITH GI STAFF**ERCP DILATION BALLOON performed by Eulalia Zaragoza MD at [facility]    ERCP N/A 2/8/2021    ERCP STENT REMOVAL performed by Alexa Lopez MD at [facility]    ERCP  2/8/2021    ERCP STONE REMOVAL [facility]    EYE SURGERY      patricia IOL    GASTRIC FUNDOPLICATION N/A 95/07/6813    XI LAPAROSCOPIC ROBOTIC HIATAL HERNIA REPAIR, CHERYL FUNDOPLICATION performed by Cheryl Chan MD at [address] 03/27/2020    EGD PEG TUBE PLACEMENT performed by Cheryl Chan MD at [address] N/A 2/8/2021    **CASE IN O.R. W/ GI STAFF - EGD WITH REMOVAL PEG TUBE performed by Ishaan Blanco MD at [facility]    HAND SURGERY      [facility] CATH POWER PICC TRIPLE  03/04/2020         HERNIA REPAIR      Hiatal hernia    HYSTERECTOMY (CERVIX STATUS UNKNOWN)      Abdominal    IR NONTUNNELED VASCULAR CATHETER  5/10/2022    IR NONTUNNELED VASCULAR CATHETER 5/10/2022 Ivis Antonio MD [facility] SPECIAL PROCEDURES    JOINT REPLACEMENT Right     right hip    OVARY REMOVAL      RHINOPLASTY      TONSILLECTOMY      TOTAL HIP ARTHROPLASTY      TOTAL NEPHRECTOMY      atrophic from infections, age 13    TRACHEOSTOMY N/A 03/27/2020    **ADD ON **WANTS 10:00AM **TRACHEOTOMY performed by Cheryl Chan MD at [facility] N/A 04/02/2020    TRACHEOTOMY EXCHANGE performed by Cheryl Chan MD at [address] [place] [address] 03/17/2020    BEDSIDE EGD ESOPHAGOGASTRODUODENOSCOPY (ICU) performed by Cheryl Chan MD at [place] [address] N/A 05/18/2020    EGD BIOPSY performed by Trevor Emerson MD at [place] [address]  05/18/2020    EGD DILATION BALLOON performed by Trevor Emerson MD at [place] [address] N/A 07/06/2020    ** CASE IN O.R. WITH GI STAFF** EGD ESOPHAGOGASTRODUODENOSCOPY performed by Genoveva Donaldson MD at [place] [address] 11/09/2020    EGD DIAGNOSTIC ONLY performed by Radha Blanco MD at [place] [address]  02/08/2021    - EGD WITH REMOVAL PEG TUBE,ERCP STENT REMOVAL,ERCP STONE REMOVAL    UPPER GASTROINTESTINAL ENDOSCOPY N/A 11/24/2021    EGD BIOPSY performed by Jose [place] Hernandez MD at [place] [address] N/A 9/8/2022    EGD BIOPSY performed by Sin Smart MD at [address] [place] [address] 10/11/2022    EGD BIOPSY performed by Sin Smart MD at [facility]       Medications:      remdesivir IVPB  100 mg IntraVENous Q24H    albuterol sulfate HFA  2 puff Inhalation 4x daily    ferrous sulfate  325 mg Oral Daily with breakfast    fluticasone  1 spray Each Nostril Daily    cetirizine  5 mg Oral Daily    lactobacillus  1 capsule Oral Daily with breakfast    senna-docusate  2 tablet Oral Nightly    polyethylene glycol  17 g Oral Daily    metoprolol succinate  12.5 mg Oral Daily    enoxaparin  30 mg SubCUTAneous BID    tamsulosin  0.4 mg Oral Daily    amitriptyline  25 mg Oral TID    busPIRone  10 mg Oral BID    escitalopram  20 mg Oral Daily    gabapentin  300 mg Oral TID    QUEtiapine  50 mg Oral Daily    QUEtiapine  100 mg Oral Nightly    traZODone  100 mg Oral Nightly    atorvastatin  40 mg Oral Daily    aspirin  81 mg Oral Daily    guaiFENesin  600 mg Oral BID    pantoprazole (PROTONIX) 40 mg injection  40 mg IntraVENous Q12H    clonazePAM  0.5 mg Oral TID    sodium chloride flush  5-40 mL IntraVENous 2 times per day    [Held by provider] dexamethasone  6 mg IntraVENous Q24H       Social History:     Social History     Socioeconomic History    Marital status:       Spouse name: Not on file    Number of children: 2    Years of education: Not on file    Highest education level: Not on file   Occupational History    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   Tobacco Use    Smoking status: Former     Packs/day: 1.00     Years: 50.00     Pack years: 50.00     Types: Cigarettes    Smokeless tobacco: Never    Tobacco comments:     quit 1 year ago    Vaping Use    Vaping Use: Former    Substances: Always   Substance and Sexual Activity Alcohol use: No    Drug use: No    Sexual activity: Never   Other Topics Concern    Not on file   Social History Narrative    Not on file     Social Determinants of Health     Financial Resource Strain: Not on file   Food Insecurity: Not on file   Transportation Needs: Not on file   Physical Activity: Not on file   Stress: Not on file   Social Connections: Not on file   Intimate Partner Violence: Not on file   Housing Stability: Not on file       Family History:     Family History   Problem Relation Age of Onset    Cancer Mother         uterine    Heart Attack Mother     Heart Attack Father     Other Maternal Grandfather         foot gangrene    Other Paternal Grandmother         bleeding ulcers    Other Paternal Grandfather         lung cancer        Allergies:   Prednisone, Compazine [prochlorperazine maleate], Penicillin v potassium, and Penicillins     Review of Systems:       Constitutional: No fevers or chills. No systemic complaints  Head: No headaches  Eyes: No double vision or blurry vision. No conjunctival inflammation. ENT: No sore throat or runny nose. . No hearing loss, tinnitus or vertigo. Cardiovascular: No chest pain or palpitations. Shortness of breath. [place]  Lung: Shortness of breath with cough. sputum production  Abdomen: No nausea, vomiting, diarrhea, or abdominal pain. Les Pimple No cramps. Genitourinary: No increased urinary frequency, or dysuria. No hematuria. No suprapubic or CVA pain  Musculoskeletal: generalized soreness of left side of body s/p fall  Hematologic: No bleeding or bruising. Neurologic: No headache, weakness, numbness, or tingling. Integument: No rash, no ulcers. Psychiatric: anxiety. Endocrine: No polyuria, no polydipsia, no polyphagia.     Physical Examination :   Patient Vitals for the past 8 hrs:   BP Temp Temp src Pulse Resp SpO2 Weight   01/06/23 0813 -- -- -- 90 14 99 % --   01/06/23 0800 125/71 -- -- 80 23 99 % --   01/06/23 0404 -- -- -- -- -- -- 181 lb 14.1 oz (82.5 kg) 01/06/23 0400 97/68 98 °F (36.7 °C) Oral 79 20 -- --     General Appearance: Awake, alert, and anxious  Head:  Normocephalic, no trauma  Eyes: Pupils equal, round, reactive to light; sclera anicteric; conjunctivae pink. No embolic phenomena. ENT: Oropharynx clear, without erythema, exudate, or thrush. No tenderness of sinuses. Mouth/throat: mucosa pink and moist. No lesions. Dentition in good repair. Neck:Supple, without lymphadenopathy. Thyroid normal, No bruits. Pulmonary/Chest: Clear to auscultation, without wheezes, rales, or rhonchi. No dullness to percussion. Cardiovascular: Regular rate and rhythm without murmurs, rubs, or gallops. Abdomen: Soft, non tender. Bowel sounds normal. No organomegaly  All four Extremities: No cyanosis, clubbing, edema, or effusions. wheelchair bound  Neurologic: No gross sensory or motor deficits. Skin: Warm and dry with good turgor. No signs of peripheral arterial or venous insufficiency. No ulcerations. No open wounds. Medical Decision Making -Laboratory:   I have independently reviewed/ordered the following labs:    CBC with Differential:   Recent Labs     01/05/23  0620 01/06/23  0426   WBC 6.2 5.3   HGB 12.2 11.9   HCT 40.5 36.8   PLT See Reflexed IPF Result See Reflexed IPF Result     BMP:   Recent Labs     01/05/23  0620 01/06/23  0426    135   K 4.0 3.6*    101   CO2 23 26   BUN 15 14   CREATININE 0.79 0.84     Hepatic Function Panel:   Recent Labs     01/04/23  1021   PROT 5.8*   LABALBU 3.1*   BILIDIR 0.1   IBILI 0.2   BILITOT 0.3   ALKPHOS 89   ALT 12   AST 13     No results for input(s): RPR in the last 72 hours. No results for input(s): HIV in the last 72 hours. No results for input(s): BC in the last 72 hours.   Lab Results   Component Value Date/Time    MUCUS NOT REPORTED 12/18/2021 12:26 PM    RBC 3.96 01/06/2023 04:26 AM    TRICHOMONAS NOT REPORTED 12/18/2021 12:26 PM    WBC 5.3 01/06/2023 04:26 AM    YEAST NOT REPORTED 12/18/2021 12:26 PM    TURBIDITY Clear 01/02/2023 12:09 AM     Lab Results   Component Value Date/Time    CREATININE 0.84 01/06/2023 04:26 AM    GLUCOSE 98 01/06/2023 04:26 AM       Medical Decision Making-Imaging:     Narrative   EXAMINATION:   ONE XRAY VIEW OF THE CHEST       1/2/2023 8:16 am       COMPARISON:   January 1, 2023       HISTORY:   ORDERING SYSTEM PROVIDED HISTORY: AECOPD   TECHNOLOGIST PROVIDED HISTORY:   AECOPD   Reason for Exam: port upright       FINDINGS:   Stable cardiomediastinal silhouette.  No significant pulmonary edema.  No   convincing lung consolidation or infiltrate.  No sizable effusion.  No   pneumothorax.           Impression   No acute cardiopulmonary process     Narrative   EXAMINATION:   CT OF THE ABDOMEN AND PELVIS WITHOUT CONTRAST 1/1/2023 3:21 pm       TECHNIQUE:   CT of the abdomen and pelvis was performed without the administration of   intravenous contrast. Multiplanar reformatted images are provided for review.   Automated exposure control, iterative reconstruction, and/or weight based   adjustment of the mA/kV was utilized to reduce the radiation dose to as low   as reasonably achievable.       COMPARISON:   08/17/2022       HISTORY:   ORDERING SYSTEM PROVIDED HISTORY: abdominal pain   TECHNOLOGIST PROVIDED HISTORY:   abdominal pain       Decision Support Exception - unselect if not a suspected or confirmed   emergency medical condition->Emergency Medical Condition (MA)       FINDINGS:   Lower Chest: Mild atelectasis or developing infiltrate in the right base.   Small bilateral pleural effusions.       Organs: Liver is mildly enlarged in size with decreased density. No focal   masses identified. No evidence of intrahepatic ductal dilatation.    Spleen   is mildly enlarged.  The gallbladder is surgically absent.  Both adrenal   glands are normal.  Pancreas is normal in appearance. Status post right   nephrectomy.  Stable cyst in the left kidney..  The left kidney is otherwise   normal in size  and attenuation without evidence of hydronephrosis or renal   calculi. GI/Bowel: The visualized bowel and mesentery show no mass lesions. No   evidence of acute appendicitis. Pelvis: Status post hysterectomy. Bladder and rectum are intact. Peritoneum/Retroperitoneum: No free fluid. No lymphadenopathy. No evidence of   pneumoperitoneum. Bones/Soft Tissues: Right hip prosthesis. .  The abdominal and pelvic walls   are unremarkable. Degenerative changes seen in the visualized spine. Scoliosis. No acute bony abnormalities. Vascular calcifications are seen   compatible with atherosclerotic disease. Impression   Mild atelectasis or developing infiltrate in the right base. Small bilateral   pleural effusions. Mild hepatosplenomegaly and fatty infiltration of the liver       Status post right nephrectomy, cholecystectomy and hysterectomy       Large hiatal hernia     Narrative   EXAMINATION:   CT OF THE HEAD WITHOUT CONTRAST  1/1/2023 2:11 pm       TECHNIQUE:   CT of the head was performed without the administration of intravenous   contrast. Automated exposure control, iterative reconstruction, and/or weight   based adjustment of the mA/kV was utilized to reduce the radiation dose to as   low as reasonably achievable. COMPARISON:   None. HISTORY:   ORDERING SYSTEM PROVIDED HISTORY: fall   TECHNOLOGIST PROVIDED HISTORY:       fall   Decision Support Exception - unselect if not a suspected or confirmed   emergency medical condition->Emergency Medical Condition (MA)       CT BRAIN FINDINGS:   BRAIN/VENTRICLES:       The cerebral hemispheres, brainstem, and cerebellum have a normal appearance   for the patient's age. The falx is midline. The ventricles and peripheral   sulci are mildly dilated. There is no sign of a space occupying lesion,   infarction, or hemorrhage. Orbits: Portion of the orbits demonstrate no acute abnormality.        SINUSES: Mucoperiosteal thickening of the ethmoid sinuses. .  The remaining   imaged portions of the paranasal sinuses are clear. The mastoids and the   middle ear chambers are clear. SOFT TISSUES/SKULL:  No acute abnormality of the visualized skull or soft   tissues. Vascular calcifications are seen compatible with atherosclerotic   disease. Impression   Mild central and cortical cerebral atrophy. No acute intracranial abnormalities are noted. Medical Decision [id number]:       Medical Decision Making-Other:     Note:  Labs, medications, radiologic studies were reviewed with personal review of films  Large amounts of data were reviewed  Discussed with nursing Staff, Discharge planner  Infection Control and Prevention measures reviewed  All prior entries were reviewed  Administer medications as ordered  Prognosis: [facility]  Discharge planning reviewed      Thank you for allowing us to participate in the care of this patient. Please call with questions. Syeda Rivera, APRN - CNP    ATTESTATION:    I have discussed the case, including pertinent history and exam findings with the APRN. I have evaluated the  History, physical findings and pictures of the patient and the key elements of the encounter have been performed by me. I have reviewed the laboratory data, other diagnostic studies and discussed them with the APRN. I have updated the medical record where necessary. I agree with the assessment, plan and orders as documented by the APRN.     Trisha Man MD.      Pager: [phone] - Office: [phone]

## 2023-01-08 NOTE — DISCHARGE SUMMARY
[facility]  Office: [address], DO, Tj Ards, DO, Sushant Gonzales, DO, Rubén Varela Blood, DO, Leoncio Salazar MD, Jing Banks MD, Kerry Waterman MD, Frank Villeda MD,  Anatoly Larsen MD, Ade Bolanos MD, Poncho Worthy, DO, Kathleen Jim MD,  Indra Lerma, DO, Manpreet Manley MD, Dorothy Mallory MD, Tori Moritz, DO, Buzz Ramírez MD, Elin Metz MD, Cecil Buerger, DO, Blair Mccrary MD, Manish Restrepo MD, Alexandr Phillip MD, Bia Rosas MD, Obdulio Sidhu DO, Shukri Monson MD, Pascale Chaudhary MD, Rumalda Snellen, CNP,  Peter Kendall, CNP, Fercho Portillo, CNP, Piyush Valencia, CNP,  Meeta De La Fuente, [facility], Dianne Escalona, CNP, Kirsty Griffith, CNP, Verónica Benavidez, CNP, Griselda Johnson, CNP, Jackie Nguyen, CNP, Luis Shelley PA-C, Dustin Call, CNS, Renae Thakkar, CNP, Anne Murillo, [facility]    Discharge Summary     Patient ID: Elaina Bass  :  1945   MRN: [id number]     ACCOUNT:  [de-identified]   Patient's PCP: Ayad Miranda DO  Admit Date: 2023   Discharge Date: 23  Length of Stay: 5  Code Status:  Prior  Admitting Physician: Nirav Aleman DO  Discharge Physician: MARGOTH Webster - SREE     Active Discharge Diagnoses:     Hospital Problem Lists:  Principal Problem:    COVID-19  Active Problems:    Intractable abdominal pain    Acute on chronic respiratory failure with hypoxia ([facility] [address]    COVID    Hypoxia    Elevated C-reactive protein (CRP)    Chronic obstructive pulmonary disease ([facility] [address]    Acute respiratory failure with hypoxia (HCC)    GERD (gastroesophageal reflux disease)    Dysphagia    Hiatal hernia    History of repair of hiatal hernia    Centrilobular emphysema ([facility] [address]    H/O gastric ulcer    Hyperlipidemia    Essential hypertension    S/P Nissen fundoplication (without gastrostomy tube) procedure    Expressive aphasia    Chronic pain    Stage 3 chronic kidney disease ([facility] [address]    Difficulty walking  Resolved Problems:    * No resolved hospital problems. *      Admission Condition:  fair     Discharged Condition: fair    Hospital Stay:     Hospital Course:  Elaina Bass is a 68 y.o. 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 Status post fall earlier today while attempting to transfer from bed to wheelchair to bathroom, patient fell on the ground, denies LOC. Did not hit head. Patient does complain of flulike symptoms with worsening nausea vomiting right lower quadrant abdominal pain with shortness of breath, cough, throbbing headaches and worsening arthralgias/myalgias. Symptoms began 2 days prior to arrival with intermittent headaches, nausea sinus congestion ear fullness and cough . Cough is nonproductive . Denies pleurisy or hemoptysis. Describes dizziness /acute lightheadedness prior to falling with transferring out of bed. Significant therapeutic interventions:     COVID-19 viral infection/PNA with elevated CRP: Infectious disease following. On remdesivir. bronchodilator. Levaquin at discharge  Constipation: Continue bowel meds, abdominal x-ray reviewed without evidence of constipation on imaging  Acute on chronic hypoxic respiratory failure: Secondary to #1. Continue low-flow O2  Hypotension: Resolved  Anxiety: Fluctuating, continue Seroquel, Lexapro, BuSpar, Elavil. CKD stage IIIa: Stable, creatinine at baseline. Nausea with emesis: Continue antiemetics, encourage p.o.     Significant Diagnostic Studies:   Labs / Micro:  CBC:   Lab Results   Component Value Date/Time    WBC 5.3 01/06/2023 04:26 AM    RBC 3.96 01/06/2023 04:26 AM    HGB 11.9 01/06/2023 04:26 AM    HCT 36.8 01/06/2023 04:26 AM    MCV 92.9 01/06/2023 04:26 AM    MCH 30.1 01/06/2023 04:26 AM    MCHC 32.3 01/06/2023 04:26 AM    RDW 13.9 01/06/2023 04:26 AM    PLT See Reflexed IPF Result 01/06/2023 04:26 AM     BMP:    Lab Results   Component Value Date/Time    GLUCOSE 98 01/06/2023 04:26 AM     01/06/2023 04:26 AM    K 3.6 01/06/2023 04:26 AM     01/06/2023 04:26 AM    CO2 26 01/06/2023 04:26 AM    ANIONGAP 8 01/06/2023 04:26 AM    BUN 14 01/06/2023 04:26 AM    CREATININE 0.84 01/06/2023 04:26 AM    BUNCRER NOT REPORTED 12/19/2021 06:02 AM    CALCIUM 8.6 01/06/2023 04:26 AM    LABGLOM >60 01/06/2023 04:26 AM    GFRAA >60 08/17/2022 06:17 PM    GFR      08/17/2022 06:17 PM     HFP:    Lab Results   Component Value Date/Time    PROT 5.8 01/04/2023 10:21 AM     CMP:    Lab Results   Component Value Date/Time    GLUCOSE 98 01/06/2023 04:26 AM     01/06/2023 04:26 AM    K 3.6 01/06/2023 04:26 AM     01/06/2023 04:26 AM    CO2 26 01/06/2023 04:26 AM    BUN 14 01/06/2023 04:26 AM    CREATININE 0.84 01/06/2023 04:26 AM    ANIONGAP 8 01/06/2023 04:26 AM    ALKPHOS 89 01/04/2023 10:21 AM    ALT 12 01/04/2023 10:21 AM    AST 13 01/04/2023 10:21 AM    BILITOT 0.3 01/04/2023 10:21 AM    LABALBU 3.1 01/04/2023 10:21 AM    ALBUMIN 1.1 01/04/2023 10:21 AM    LABGLOM >60 01/06/2023 04:26 AM    GFRAA >60 08/17/2022 06:17 PM    GFR      08/17/2022 06:17 PM    PROT 5.8 01/04/2023 10:21 AM    CALCIUM 8.6 01/06/2023 04:26 AM     PT/INR:    Lab Results   Component Value Date/Time    PROTIME 11.1 01/02/2023 05:59 AM    INR 1.0 01/02/2023 05:59 AM     PTT:   Lab Results   Component Value Date/Time    APTT 26.8 01/02/2023 05:59 AM     FLP:    Lab Results   Component Value Date/Time    CHOL 203 12/14/2020 05:51 AM    TRIG 268 12/14/2020 05:51 AM    HDL 43 12/14/2020 05:51 AM     U/A:    Lab Results   Component Value Date/Time    COLORU Yellow 01/02/2023 12:09 AM    TURBIDITY Clear 01/02/2023 12:09 AM    SPECGRAV 1.023 01/02/2023 12:09 AM    HGBUR SMALL 01/02/2023 12:09 AM    PHUR 6.0 01/02/2023 12:09 AM    PROTEINU 1+ 01/02/2023 12:09 AM    GLUCOSEU NEGATIVE 01/02/2023 12:09 AM    KETUA NEGATIVE 01/02/2023 12:09 AM    BILIRUBINUR NEGATIVE 01/02/2023 12:09 AM    BILIRUBINUR Negative 06/29/2018 08:55 AM    UROBILINOGEN Normal 01/02/2023 12:09 AM    NITRU NEGATIVE 01/02/2023 12:09 AM    LEUKOCYTESUR NEGATIVE 01/02/2023 12:09 AM     TSH:  No results found for: TSH     Radiology:  CT ABDOMEN PELVIS WO CONTRAST Additional Contrast? None    Result Date: 1/1/2023  Mild atelectasis or developing infiltrate in the right base. Small bilateral pleural effusions. Mild hepatosplenomegaly and fatty infiltration of the liver Status post right nephrectomy, cholecystectomy and hysterectomy Large hiatal hernia     XR KNEE LEFT (3 VIEWS)    Result Date: 1/1/2023  Mild to moderate osteoarthritis of the left knee. No acute bony abnormalities are noted of the bilateral hips, left shoulder and left knee. Total right hip arthropasty without acute hardware complication. XR ABDOMEN (KUB) (SINGLE AP VIEW)    Result Date: 1/3/2023  Air dilated small bowel loops mid abdomen, nonspecific. CT HEAD WO CONTRAST    Result Date: 1/1/2023  Mild central and cortical cerebral atrophy. No acute intracranial abnormalities are noted. XR SHOULDER LEFT (MIN 2 VIEWS)    Result Date: 1/1/2023  Mild to moderate osteoarthritis of the left knee. No acute bony abnormalities are noted of the bilateral hips, left shoulder and left knee. Total right hip arthropasty without acute hardware complication. XR CHEST PORTABLE    Result Date: 1/5/2023  Left lower lobe atelectasis or pneumonia     XR CHEST PORTABLE    Result Date: 1/2/2023  No acute cardiopulmonary process     XR CHEST PORTABLE    Result Date: 1/1/2023  No acute process.      XR HIP W PELVIS MIN 5 VWS BILATERAL    Result Date: 1/1/2023  Mild to moderate osteoarthritis of the left knee. No acute bony abnormalities are noted of the bilateral hips, left shoulder and left knee. Total right hip arthropasty without acute hardware complication. Consultations:    Consults:     Final Specialist Recommendations/Findings:   IP CONSULT TO HOSPITALIST  IP CONSULT TO INFECTIOUS DISEASES  PHARMACY TO DOSE MEDICATION  PHARMACY TO DOSE MEDICATION  PHARMACY TO DOSE MEDICATION      The patient was seen and examined on day of discharge and this discharge summary is in conjunction with any daily progress note from day of discharge.     Discharge plan:     Disposition: SNF    Physician Follow Up:     DO Iris Fagn 27  [facility]  [phone]    Schedule an appointment as soon as possible for a visit in 1 week(s)  for follow up after hospitalization    [address]  [facility] [address] [place] [address]  [phone]  Schedule an appointment as soon as possible for a visit  ASAP for follow up on chu cath       Requiring Further Evaluation/Follow Up POST HOSPITALIZATION/Incidental Findings: Continue Chu catheter, follow-up with urology as outpatient, call and make appointment    Diet: regular diet and cardiac diet    Activity: As tolerated    Instructions to Patient: see attached     Discharge Medications:      Medication List        START taking these medications      benzonatate 200 MG capsule  Commonly known as: TESSALON  Take 1 capsule by mouth 3 times daily as needed for Cough     levoFLOXacin 750 MG tablet  Commonly known as: LEVAQUIN  Take 1 tablet by mouth daily for 7 doses            CONTINUE taking these medications      acetaminophen 500 MG tablet  Commonly known as: TYLENOL     albuterol (2.5 MG/3ML) 0.083% nebulizer solution  Commonly known as: PROVENTIL     aluminum & magnesium hydroxide-simethicone 200-200-20 MG/5ML Susp suspension  Commonly known as: MAALOX     amitriptyline 25 MG tablet  Commonly known as: ELAVIL bisacodyl 10 MG suppository  Commonly known as: DULCOLAX     busPIRone 10 MG tablet  Commonly known as: BUSPAR  Take 1 tablet by mouth 2 times daily     carboxymethylcellulose 1 % ophthalmic solution     clonazePAM 0.5 MG tablet  Commonly known as: KLONOPIN     escitalopram 20 MG tablet  Commonly known as: LEXAPRO     ferrous sulfate 325 (65 Fe) MG EC tablet  Commonly known as: FE TABS 325  Take 1 tablet by mouth daily (with breakfast)     fesoterodine 4 MG Tb24 ER tablet  Commonly known as: TOVIAZ     fluticasone 50 MCG/ACT nasal spray  Commonly known as: FLONASE     Folic Acid-Cholecalciferol 1-2000 MG-UNIT Tabs     gabapentin 300 MG capsule  Commonly known as: NEURONTIN     guaiFENesin 600 MG extended release tablet  Commonly known as: MUCINEX     Lactobacillus Tabs     loratadine 10 MG capsule  Commonly known as: CLARITIN     magnesium hydroxide 400 MG/5ML suspension  Commonly known as: MILK OF MAGNESIA     metoclopramide 5 MG tablet  Commonly known as: Reglan  Take 1 tablet by mouth 4 times daily     metoprolol succinate 25 MG extended release tablet  Commonly known as: TOPROL XL  Take 1 tablet by mouth daily     ondansetron 4 MG tablet  Commonly known as: ZOFRAN     oxyCODONE-acetaminophen 5-325 MG per tablet  Commonly known as: PERCOCET  Take 1 tablet by mouth every 8 hours as needed for Pain for up to 3 days.  Max Daily Amount: 3 tablets     Oyster Shell 500 MG Tabs     pantoprazole 40 MG tablet  Commonly known as: PROTONIX  Take 1 tablet by mouth 2 times daily     polyethylene glycol 17 g packet  Commonly known as: GLYCOLAX  Take 17 g by mouth daily as needed for Constipation     * QUEtiapine 100 MG tablet  Commonly known as: SEROQUEL  Take 1 tablet by mouth nightly for 3 days     * QUEtiapine 50 MG tablet  Commonly known as: SEROQUEL     simvastatin 40 MG tablet  Commonly known as: ZOCOR     sodium phosphate 7-19 GM/118ML     sucralfate 1 GM tablet  Commonly known as: CARAFATE  Take 1 tablet by mouth 4 times daily     traZODone 100 MG tablet  Commonly known as: DESYREL     vitamin D 50 MCG (2000 UT) Caps capsule  Take 1 capsule by mouth daily           * This list has 2 medication(s) that are the same as other medications prescribed for you. Read the directions carefully, and ask your doctor or other care provider to review them with you. STOP taking these medications      budesonide-formoterol 160-4.5 MCG/ACT Aero  Commonly known as: SYMBICORT               Where to Get Your Medications        You can get these medications from any pharmacy    Bring a paper prescription for each of these medications  oxyCODONE-acetaminophen 5-325 MG per tablet       Information about where to get these medications is not yet available    Ask your nurse or doctor about these medications  benzonatate 200 MG capsule  levoFLOXacin 750 MG tablet         No discharge procedures on file. Time Spent on discharge is  32 mins in patient examination, evaluation, counseling as well as medication reconciliation, prescriptions for required medications, discharge plan and follow up. Electronically signed by   MARGOTH Simpson NP  1/8/2023  4:52 PM      Thank you Dr. Humberto Alejandro DO for the opportunity to be involved in this patient's care.

## 2023-02-01 ENCOUNTER — TELEMEDICINE (OUTPATIENT)
Dept: GASTROENTEROLOGY | Age: 78
End: 2023-02-01

## 2023-02-01 DIAGNOSIS — Z98.890 S/P NISSEN FUNDOPLICATION (WITHOUT GASTROSTOMY TUBE) PROCEDURE: ICD-10-CM

## 2023-02-01 DIAGNOSIS — R74.8 ELEVATED LIVER ENZYMES: ICD-10-CM

## 2023-02-01 DIAGNOSIS — K21.01 GASTROESOPHAGEAL REFLUX DISEASE WITH ESOPHAGITIS AND HEMORRHAGE: ICD-10-CM

## 2023-02-01 DIAGNOSIS — R19.8 ABNORMAL FINDINGS ON ESOPHAGOGASTRODUODENOSCOPY (EGD): Primary | ICD-10-CM

## 2023-02-01 DIAGNOSIS — K31.1 GASTRIC OUTLET OBSTRUCTION: ICD-10-CM

## 2023-02-01 DIAGNOSIS — R11.0 NAUSEA: ICD-10-CM

## 2023-02-01 RX ORDER — OXYCODONE HYDROCHLORIDE AND ACETAMINOPHEN 5; 325 MG/1; MG/1
TABLET ORAL
Status: ON HOLD | COMMUNITY
Start: 2023-01-29

## 2023-02-01 ASSESSMENT — ENCOUNTER SYMPTOMS
RECTAL PAIN: 0
VOMITING: 1
CHOKING: 0
BLOOD IN STOOL: 0
COUGH: 0
SHORTNESS OF BREATH: 0
CONSTIPATION: 1
ABDOMINAL DISTENTION: 0
NAUSEA: 1
ABDOMINAL PAIN: 1
ANAL BLEEDING: 0
DIARRHEA: 1
WHEEZING: 0
TROUBLE SWALLOWING: 0

## 2023-02-01 NOTE — PROGRESS NOTES
Rosario Lopez is a 68 y.o. female evaluated via telephone on 2/1/2023. Consent:  She and/or health care decision maker is aware that that she may receive a bill for this telephone service, depending on her insurance coverage, and has provided verbal consent to proceed: Yes      GI  FOLLOW UP    INTERVAL HISTORY:   No referring provider defined for this encounter. Chief Complaint   Patient presents with    Emesis     Patient was in hospital for [facility] in January. She states that prior to that, she was vomiting blood. Since hospital stay she has been vomiting daily without blood    Abdominal Pain     Patient c/o upper to mid abd pain that she is taking percocet for. She is also c/o nauswea.            HISTORY OF PRESENT ILLNESS: Purnima Hooker is a 68 y.o. female , referred for evaluation of     GERD, gastric outlet obstruction, dysphagia and dilatation, colon polyps, hemorrhoids  Elevated alkaline phosphatase     We are seeing this patient on a virtual visit  She was in the hospital with COVID-19  And right now complaining of significant nausea and vomiting and abdominal pain  She takes Percocet 3 times a day for the abdominal pain  She feels okay in the morning and for 2 hours later she started having the severe nausea  We have done EGD on her twice in the last few months in September her EGD showed severe inflammation and thickening of the distal esophagus from her previous Nissen wrap  In addition to severe inflammation of the stomach with nodular mucosa  With the pyloric orifice is severely inflamed and edematous with multiple nodules and mucosal thickening  Biopsies were taken  And repeat EGD was done again in October which showed that the stomach still looking the same with what looks to be gastric outlet obstruction related to inflammation or nodularity around the pyloric orifice  Once again multiple biopsies were taken  The esophagus on the second look look okay  Biopsies and both times are showing just inflammation and possible chemical gastritis no bacteria no malignancy  The last the patient still is miserable with the symptoms according to what she described  So in the past  we have seen her with the anemia which has stabilized and her hemoglobin was normalized, she is status post Nissen  Her reflux was controlled  She did have elevated alkaline phosphatase which we thought may be related to the bone as the CAT scan of the abdomen was negative     In the past also there was some concern because her MRI was done showed CBD obstruction for which an ERCP was done, was difficult to read please refer to the ERCP result. Another ERCP was done and the stent was removed and there is sludge and stones were removed. This is has been resolved  Last visit   Egd scheduled because of abnormal ct scan result and stool for panel and c diff ordered because of diarrhea   She did not do the stool studies  But the stool studies for the H. pylori was done and was negative    As mentioned above the patient still having gastric outlet obstruction symptoms with abdominal pain and nausea  When she was in the hospital she had a CAT scan without contrast        Egd   Findings:     Retropharyngeal area was grossly normal appearing     Esophagus: abnormal:   Esophagus looking a lot better at this time, the thickening and the edema is gone     Stomach: The stomach still looking the same way with inflammation and thickening of the mucosa in the cardia and the body of the stomach biopsies were taken but separately  In the antrum severe inflammation with nodular mucosa especially around the pyloric orifice once again multiple biopsies were taken from that but in a separate jar        Duodenum:     Descending: normal    Bulb: normal     The scope was removed and the patient tolerated the procedure well.       Recommendations/Plan:   F/U Biopsies  F/U In Office in 3-4 weeks  Discussed with the family     Electronically signed by Jolene Phillip MD  on 10/11/2022 at 11:41 AM         Past Medical,Family, and Social History reviewed and does contribute to the patient presentingcondition. Patient's PMH/PSH,SH,PSYCH Hx, MEDs, ALLERGIES, and ROS were all reviewed and updated in the appropriate sections.     PAST MEDICAL HISTORY:  Past Medical History:   Diagnosis Date    A-fib ([facility] [address]     Acquired absence of kidney     Adult hypertrophic pyloric stenosis     Agoraphobia with panic disorder     Agoraphobia without history of panic disorder     Allergic rhinitis     Anemia, unspecified     Anxiety     Anxiety disorder     Arthritis     Atrial fibrillation (HCC)     Back pain     Bronchitis     Caffeine use     8 coffee / day    Cardiomegaly     Carpal tunnel syndrome     Cataracts, bilateral     Centriacinar emphysema (HCC)     Chronic kidney disease, stage III (moderate) (HCC)     Chronic pain     Constipation     Constipation     COPD (chronic obstructive pulmonary disease) (HCC)     Emphysema    Cystitis with hematuria     Depression     Diaphragmatic hernia without obstruction or gangrene     Diarrhea     Difficulty walking     Dry eye syndrome of unspecified lacrimal gland     Esophageal obstruction     FOM (frequency of micturition)     Foot drop, right foot     Gastro-esophageal reflux disease with esophagitis, with bleeding     GERD (gastroesophageal reflux disease)     H/O gastric ulcer     HTN (hypertension)     Hyperlipidemia     Hypertension, essential     Klebsiella pneumoniae (k. pneumoniae) as the cause of diseases classified elsewhere     Major depression, recurrent (HCC)     Mitral valve prolapse     Mixed hyperlipidemia     Mumps     Muscle weakness (generalized)     MVP (mitral valve prolapse)     Dr. Vern Motta in May 2019    Old myocardial infarction     Personal history of disease of digestive system     Psychophysiological insomnia     Recurrent UTI     Dr. Emelyn Lawrence    S/P PICC central line placement 08/05/2022    \"no longer present\" per Arabella Asp (Nurse) at [facility] [place]. Sepsis ([address]     Sinusitis     Tracheostomy in place [facility])     \"No longer present\" per Arabella Asp (Nurse) at [facility] of [place].     Ulcerative esophagitis     Urinary tract infection, site not specified     Uses wheelchair     Wears glasses     Wellness examination     Dr. Rosanne Dancer seen in Jan 2020       Past Surgical History:   Procedure Laterality Date    APPENDECTOMY      CARPAL [facility], LAPAROSCOPIC N/A 05/22/2020    XI LAPAROSCOPIC ROBOTIC CHOLECYSTECTOMY, PYLOROPLASTY performed by Aman Earl MD at [address] [place] [address]      COLONOSCOPY N/A 1/28/2022    COLONOSCOPY POLYPECTOMY HOT performed by Ernesto Villar MD at [place] [address]      ERCP  05/21/2020    with stent insertion, balloon dilation, sphinctereotomy    ERCP  05/21/2020    ** CASE IN OR WITY GI STAFF** ERCP STENT INSERTION performed by Ernesto Villar MD at [facility] End[facility]    ERCP  05/21/2020    ** CASE IN OR WITH GI STAFF**ERCP SPHINCTER/PAPILLOTOMY performed by Ernesto Villar MD at [facility]    ERCP  05/21/2020    ** CASE IN OR WITH GI STAFF**ERCP DILATION BALLOON performed by Ernesto Villar MD at [facility]    ERCP N/A 2/8/2021    ERCP STENT REMOVAL performed by Lakeisha Borja MD at [facility]    ERCP  2/8/2021    ERCP STONE REMOVAL performed by Lakeisha Borja MD at [facility]    GASTRIC FUNDOPLICATION N/A 51/11/5027    XI LAPAROSCOPIC ROBOTIC [address], CHERYL FUNDOPLICATION performed by Aman Earl MD at [address] 03/27/2020    EGD PEG TUBE PLACEMENT performed by Aman Earl MD at [address] N/A 2/8/2021    **CASE IN O.R. W/ GI STAFF - EGD WITH REMOVAL PEG TUBE performed by Lakeisha Borja MD at [facility]    [facility] SURGERY      [facility]. CATH POWER PICC TRIPLE  03/04/2020         HERNIA REPAIR      Hiatal hernia    HYSTERECTOMY (CERVIX STATUS UNKNOWN)      Abdominal    IR NONTUNNELED VASCULAR CATHETER  5/10/2022    IR NONTUNNELED VASCULAR CATHETER 5/10/2022 Saran Menchaca MD [facility] SPECIAL PROCEDURES    JOINT REPLACEMENT Right     right hip    OVARY REMOVAL      RHINOPLASTY      TONSILLECTOMY      TOTAL HIP ARTHROPLASTY      TOTAL NEPHRECTOMY      atrophic from infections, age 13    TRACHEOSTOMY N/A 03/27/2020    **ADD ON **WANTS 10:00AM **TRACHEOTOMY performed by Byron Ennis MD at [address] [place] [address] N/A 04/02/2020    TRACHEOTOMY EXCHANGE performed by Byron Ennis MD at [address] [place] [address] 03/17/2020    BEDSIDE EGD ESOPHAGOGASTRODUODENOSCOPY (ICU) performed by Byron Ennis MD at [place] [address] 05/18/2020    EGD BIOPSY performed by Tony Hough MD at [place] [address]  05/18/2020    EGD DILATION BALLOON performed by Tony Hough MD at [place] [address] N/A 07/06/2020    ** CASE IN O.R. WITH GI STAFF** EGD ESOPHAGOGASTRODUODENOSCOPY performed by Essie Boateng MD at [place] [address] 11/09/2020    EGD DIAGNOSTIC ONLY performed by Michael Duncan MD at [place] [address]  02/08/2021    - EGD WITH REMOVAL PEG TUBE,ERCP STENT REMOVAL,ERCP STONE REMOVAL    UPPER GASTROINTESTINAL ENDOSCOPY N/A 11/24/2021    EGD BIOPSY performed by Michael Duncan MD at [place] [address] N/A 9/8/2022    EGD BIOPSY performed by Tony Hough MD at [address] [place] [address] N/A 10/11/2022    EGD BIOPSY performed by Tony Hough MD at [address] [place] [address]:    Current Outpatient Medications:     oxyCODONE-acetaminophen (PERCOCET) 5-325 MG per tablet, , Disp: , Rfl:     amitriptyline (ELAVIL) 25 MG tablet, Take 1 tablet by mouth in the morning, at noon, and at bedtime, Disp: , Rfl:     fesoterodine (TOVIAZ) 4 MG TB24 ER tablet, Take 1 tablet by mouth, Disp: , Rfl:     gabapentin (NEURONTIN) 300 MG capsule, Take 1 capsule by mouth in the morning, at noon, and at bedtime. , Disp: , Rfl:     escitalopram (LEXAPRO) 20 MG tablet, Take 1 tablet by mouth daily, Disp: , Rfl:     QUEtiapine (SEROQUEL) 50 MG tablet, Take 1 tablet by mouth daily In the morning, Disp: , Rfl:     Sodium Phosphates (FLEET) 7-19 GM/118ML, Place 1 enema rectally once as needed, Disp: , Rfl:     aluminum & magnesium hydroxide-simethicone (MAALOX) 200-200-20 MG/5ML SUSP suspension, Take 5 mLs by mouth as needed for Indigestion, Disp: , Rfl:     traZODone (DESYREL) 100 MG tablet, Take 100 mg by mouth nightly, Disp: , Rfl:     ondansetron (ZOFRAN) 4 MG tablet, Take 4 mg by mouth every 8 hours as needed for Nausea or Vomiting, Disp: , Rfl:     albuterol (PROVENTIL) (2.5 MG/3ML) 0.083% nebulizer solution, Take 2.5 mg by nebulization every 6 hours as needed for Wheezing, Disp: , Rfl:     carboxymethylcellulose 1 % ophthalmic solution, Place 1 drop into both eyes in the morning and 1 drop at noon and 1 drop in the evening and 1 drop before bedtime. , Disp: , Rfl:     magnesium hydroxide (MILK OF MAGNESIA) 400 MG/5ML suspension, Take 30 mLs by mouth Give 30ml by mouth as needed for constipation no BM x3 days, Disp: , Rfl:     acetaminophen (TYLENOL) 500 MG tablet, Take 1,000 mg by mouth in the morning and at bedtime, Disp: , Rfl:     Cholecalciferol (VITAMIN D) 50 MCG (2000 UT) CAPS capsule, Take 1 capsule by mouth daily, Disp: 30 capsule, Rfl: 0    guaiFENesin (MUCINEX) 600 MG extended release tablet, Take 400 mg by mouth in the morning. Indications: Cough. As needed for mucus. , Disp: , Rfl:     Lactobacillus TABS, Take 1 tablet by mouth 2 times daily Take One tablet by mouth two times daily, Disp: , Rfl:     fluticasone (FLONASE) 50 MCG/ACT nasal spray, 1 spray by Each Nostril route daily, Disp: , Rfl: loratadine (CLARITIN) 10 MG capsule, Take 10 mg by mouth daily, Disp: , Rfl:     clonazePAM (KLONOPIN) 0.5 MG tablet, Take 0.5 mg by mouth 3 times daily as needed (tid). , Disp: , Rfl:     metoclopramide (REGLAN) 5 MG tablet, Take 1 tablet by mouth 4 times daily, Disp: 120 tablet, Rfl: 3    polyethylene glycol (GLYCOLAX) 17 g packet, Take 17 g by mouth daily as needed for Constipation, Disp: 527 g, Rfl: 1    QUEtiapine (SEROQUEL) 100 MG tablet, Take 1 tablet by mouth nightly for 3 days, Disp: 3 tablet, Rfl: 0    Folic Acid-Cholecalciferol 1-2000 MG-UNIT TABS, Take by mouth, Disp: , Rfl:     busPIRone (BUSPAR) 10 MG tablet, Take 1 tablet by mouth 2 times daily, Disp: , Rfl: 0    pantoprazole (PROTONIX) 40 MG tablet, Take 1 tablet by mouth 2 times daily, Disp: 60 tablet, Rfl: 0    bisacodyl (DULCOLAX) 10 MG suppository, Place 10 mg rectally daily as needed for Constipation PRN for constipation no BM x4 days. , Disp: , Rfl:     ferrous sulfate (FE TABS 325) 325 (65 Fe) MG EC tablet, Take 1 tablet by mouth daily (with breakfast), Disp: 90 tablet, Rfl: 3    metoprolol succinate (TOPROL XL) 25 MG extended release tablet, Take 1 tablet by mouth daily, Disp: 30 tablet, Rfl: 3    sucralfate (CARAFATE) 1 GM tablet, Take 1 tablet by mouth 4 times daily, Disp: 120 tablet, Rfl: 3    Oyster Shell 500 MG TABS, Take 500 mg by mouth 2 times daily , Disp: , Rfl:     simvastatin (ZOCOR) 40 MG tablet, Take 40 mg by mouth nightly., Disp: , Rfl:     ALLERGIES:   Allergies   Allergen Reactions    Prednisone Anxiety    Compazine [Prochlorperazine Maleate]     Penicillin V Potassium     Penicillins Other (See Comments)     Shock- received meropenem and ceftriaxone wo issues 2020       FAMILY HISTORY:       Problem Relation Age of Onset    Cancer Mother         uterine    Heart Attack Mother     Heart Attack Father     Other Maternal Grandfather         foot gangrene    Other Paternal Grandmother         bleeding ulcers    Other Paternal Grandfather         lung cancer         SOCIAL HISTORY:   Social History     Socioeconomic History    Marital status:      Spouse name: Not on file    Number of children: 2    Years of education: Not on file    Highest education level: Not on file   Occupational History    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   Tobacco Use    Smoking status: Former     Packs/day: 1.00     Years: 50.00     Pack years: 50.00     Types: Cigarettes    Smokeless tobacco: Never    Tobacco comments:     quit 1 year ago    Vaping Use    Vaping Use: Former    Substances: Always   Substance and Sexual Activity    Alcohol use: No    Drug use: No    Sexual activity: Never   Other Topics Concern    Not on file   Social History Narrative    Not on file     Social Determinants of Health     Financial Resource Strain: Not on file   Food Insecurity: Not on file   Transportation Needs: Not on file   Physical Activity: Not on file   Stress: Not on file   Social Connections: Not on file   Intimate Partner Violence: Not on file   Housing Stability: Not on file       REVIEW OF SYSTEMS: A 12-point review of systemswas obtained and pertinent positives and negatives were enumerated above in the history of present illness. All other reviewed systems / symptoms were negative. Review of Systems   Constitutional:  Negative for appetite change, fatigue and unexpected weight change. HENT:  Negative for trouble swallowing. Respiratory:  Negative for cough, choking, shortness of breath and wheezing. Cardiovascular:  Negative for chest pain, palpitations and leg swelling. Gastrointestinal:  Positive for abdominal pain, constipation, diarrhea, nausea and vomiting. Negative for abdominal distention, anal bleeding, blood in stool and rectal pain. Genitourinary:  Negative for difficulty urinating. Allergic/Immunologic: Negative for environmental allergies and food allergies.    Neurological:  Negative for dizziness, weakness, light-headedness, numbness and headaches. Hematological:  Bruises/bleeds easily. Psychiatric/Behavioral:  Negative for sleep disturbance. The patient is nervous/anxious. LABORATORY DATA: Reviewed  Lab Results   Component Value Date    WBC 5.3 01/06/2023    HGB 11.9 01/06/2023    HCT 36.8 01/06/2023    MCV 92.9 01/06/2023    PLT See Reflexed IPF Result 01/06/2023     01/06/2023    K 3.6 (L) 01/06/2023     01/06/2023    CO2 26 01/06/2023    BUN 14 01/06/2023    CREATININE 0.84 01/06/2023    LABALBU 3.1 (L) 01/04/2023    BILITOT 0.3 01/04/2023    ALKPHOS 89 01/04/2023    AST 13 01/04/2023    ALT 12 01/04/2023    INR 1.0 01/02/2023         Lab Results   Component Value Date    RBC 3.96 01/06/2023    HGB 11.9 01/06/2023    MCV 92.9 01/06/2023    MCH 30.1 01/06/2023    MCHC 32.3 01/06/2023    RDW 13.9 01/06/2023    MPV 11.1 01/01/2023    BASOPCT 0 01/02/2023    LYMPHSABS 0.72 (L) 01/02/2023    MONOSABS 0.20 01/02/2023    NEUTROABS 5.58 01/02/2023    EOSABS 0.00 01/02/2023    BASOSABS 0.00 01/02/2023         DIAGNOSTIC TESTING:     XR ABDOMEN (KUB) (SINGLE AP VIEW)    Result Date: 1/3/2023  EXAMINATION: ONE SUPINE XRAY VIEW(S) OF THE ABDOMEN 1/3/2023 8:17 pm COMPARISON: CT abdomen pelvis 01/01/2023. Abdomen x-ray 05/08/2022 HISTORY: ORDERING SYSTEM PROVIDED HISTORY: abd pain TECHNOLOGIST PROVIDED HISTORY: abd pain FINDINGS: Air dilated small bowel loops mid abdomen, nonspecific. Lower lungs are clear. No free air. Degenerative changes lumbar spine. Saldana in place. Right hip arthroplasty. Air dilated small bowel loops mid abdomen, nonspecific. XR CHEST PORTABLE    Result Date: 1/5/2023  EXAMINATION: ONE XRAY VIEW OF THE CHEST 1/5/2023 11:44 am COMPARISON: 01/02/2023 HISTORY: ORDERING SYSTEM PROVIDED HISTORY: increased O2 demands with Covid TECHNOLOGIST PROVIDED HISTORY: increased O2 demands with Covid FINDINGS: Cardiomediastinal silhouette stable. Left basilar opacification.   No pulmonary vascular congestion or edema. No pneumothorax. Left lower lobe atelectasis or pneumonia          PHYSICAL EXAMINATION:     [ INSTRUCTIONS:  \"[x]\" Indicates a positive item  \"[]\" Indicates a negative item  -- DELETE ALL ITEMS NOT EXAMINED]  Vital Signs: (As obtained by patient/caregiver or practitioner observation)    Blood pressure-  Heart rate-    Respiratory rate-    Temperature-  Pulse oximetry-     Constitutional: [x] Appears well-developed and well-nourished [x] No apparent distress      [] Abnormal-   Mental status  [] Alert and awake  [] Oriented to person/place/time []Able to follow commands      Eyes:  EOM    [x]  Normal  [] Abnormal-  Sclera  [x]  Normal  [] Abnormal -         Discharge [x]  None visible  [] Abnormal -    HENT:   [x] Normocephalic, atraumatic. [] Abnormal   [x] Mouth/Throat: Mucous membranes are moist.     External Ears [x] Normal  [] Abnormal-     Neck: [x] No visualized mass     Pulmonary/Chest: [x] Respiratory effort normal.  [x] No visualized signs of difficulty breathing or respiratory distress        [] Abnormal-      Musculoskeletal:   [x] Normal gait with no signs of ataxia         [x] Normal range of motion of neck        [] Abnormal-       Neurological:        [x] No Facial Asymmetry (Cranial nerve 7 motor function) (limited exam to video visit)          [x] No gaze palsy        [] Abnormal-         Skin:        [x] No significant exanthematous lesions or discoloration noted on facial skin         [] Abnormal-            Psychiatric:       [x] Normal Affect [x] No Hallucinations        [] Abnormal-     Other pertinent observable physical exam findings-         IMPRESSION: Ms. Leonardo Crespo is a 68 y.o. female with      Diagnosis Orders   1. Abnormal findings on esophagogastroduodenoscopy (EGD)  CT ABDOMEN PELVIS W IV CONTRAST      2. Gastric outlet obstruction  CT ABDOMEN PELVIS W IV CONTRAST    CEA      3. Nausea        4. Elevated liver enzymes        5.  S/P Nissen fundoplication (without gastrostomy tube) procedure        6. Gastroesophageal reflux disease with esophagitis and hemorrhage          This patient with a persistent gastric outlet obstruction is very worrisome for malignancy although the biopsies twice already negative  We will repeat the CAT scan 1 more time with contrast  If still abnormal will send her for surgical option    Meanwhile we will double the PPI  And keep her on regular Zofran            Diet/life style/natural hx /complication of the dx were all explained in details   Past medical, past surgical, social history, psychiatric history, medications or allergies, all reviewed and  updated    Erin Ramirez is a 77 y.o. female being evaluated by a Virtual Visit (video visit) encounter to address concerns as mentioned above.  A caregiver was present when appropriate. Due to this being a TeleHealth encounter (During COVID-19 public health emergency), evaluation of the following organ systems was limited: Vitals/Constitutional/EENT/Resp/CV/GI//MS/Neuro/Skin/Heme-Lymph-Imm.  Pursuant to the emergency declaration under the Broussard Act and the National Emergencies Act, 1135 waiver authority and the Coronavirus Preparedness and Response Supplemental Appropriations Act, this Virtual Visit was conducted with patient's (and/or legal guardian's) consent, to reduce the patient's risk of exposure to COVID-19 and provide necessary medical care.  The patient (and/or legal guardian) has also been advised to contact this office for worsening conditions or problems, and seek emergency medical treatment and/or call 911 if deemed necessary.     Services were provided through a video synchronous discussion virtually to substitute for in-person clinic visit. Patient and provider were located at their individual homes.    -- on 2/1/2023 at 6:47 PM    Total Time: minutes: 21-30 minutes    Services were provided through a video synchronous discussion virtually to substitute for in-person clinic visit.    Thank  you for allowing me to participate in the care of Ms. Leonardo Crespo. For any further questions please do not hesitate to contact me. I have reviewed and agree with the ROS entered by the MA/RN. Note is dictated utilizing voice recognition software. Unfortunately this leads to occasional typographical errors. Please contact our office if you have any questions. An electronic signature was used to authenticate this note.         Tony Hough MD  [facility]  O: #[phone]

## 2023-02-03 ENCOUNTER — APPOINTMENT (OUTPATIENT)
Dept: CT IMAGING | Age: 78
End: 2023-02-03
Payer: MEDICARE

## 2023-02-03 ENCOUNTER — APPOINTMENT (OUTPATIENT)
Dept: GENERAL RADIOLOGY | Age: 78
End: 2023-02-03
Payer: MEDICARE

## 2023-02-03 ENCOUNTER — HOSPITAL ENCOUNTER (INPATIENT)
Age: 78
LOS: 5 days | Discharge: OTHER INSTITUTION | End: 2023-02-08
Attending: EMERGENCY MEDICINE | Admitting: INTERNAL MEDICINE
Payer: MEDICARE

## 2023-02-03 DIAGNOSIS — N30.00 ACUTE CYSTITIS WITHOUT HEMATURIA: ICD-10-CM

## 2023-02-03 DIAGNOSIS — R41.82 ALTERED MENTAL STATUS, UNSPECIFIED ALTERED MENTAL STATUS TYPE: Primary | ICD-10-CM

## 2023-02-03 DIAGNOSIS — K52.9 COLITIS: ICD-10-CM

## 2023-02-03 DIAGNOSIS — J18.9 PNEUMONIA DUE TO INFECTIOUS ORGANISM, UNSPECIFIED LATERALITY, UNSPECIFIED PART OF LUNG: ICD-10-CM

## 2023-02-03 LAB
ABSOLUTE EOS #: 0.05 K/UL (ref 0–0.4)
ABSOLUTE IMMATURE GRANULOCYTE: 0.14 K/UL (ref 0–0.3)
ABSOLUTE LYMPH #: 0.62 K/UL (ref 1–4.8)
ABSOLUTE MONO #: 0.34 K/UL (ref 0.1–0.8)
ANION GAP SERPL CALCULATED.3IONS-SCNC: 14 MMOL/L (ref 9–17)
ANION GAP: 5 MMOL/L (ref 7–16)
BACTERIA: ABNORMAL
BASOPHILS # BLD: 0 % (ref 0–2)
BASOPHILS ABSOLUTE: 0 K/UL (ref 0–0.2)
BILIRUBIN URINE: NEGATIVE
BUN SERPL-MCNC: 13 MG/DL (ref 8–23)
CALCIUM SERPL-MCNC: 9.5 MG/DL (ref 8.6–10.4)
CASTS UA: ABNORMAL /LPF (ref 0–8)
CHLORIDE SERPL-SCNC: 98 MMOL/L (ref 98–107)
CK SERPL-CCNC: 55 U/L (ref 26–192)
CO2 SERPL-SCNC: 22 MMOL/L (ref 20–31)
COLOR: YELLOW
CREAT SERPL-MCNC: 1.09 MG/DL (ref 0.5–0.9)
CRP SERPL HS-MCNC: 29.4 MG/L (ref 0–5)
EGFR, POC: 55 ML/MIN/1.73M2
EOSINOPHILS RELATIVE PERCENT: 1 % (ref 1–4)
EPITHELIAL CELLS UA: ABNORMAL /HPF (ref 0–5)
GFR SERPL CREATININE-BSD FRML MDRD: 52 ML/MIN/1.73M2
GLUCOSE BLD-MCNC: 90 MG/DL (ref 74–100)
GLUCOSE SERPL-MCNC: 87 MG/DL (ref 70–99)
GLUCOSE UR STRIP.AUTO-MCNC: NEGATIVE MG/DL
HCO3 VENOUS: 24.7 MMOL/L (ref 22–29)
HCT VFR BLD AUTO: 43 % (ref 36.3–47.1)
HGB BLD-MCNC: 13.5 G/DL (ref 11.9–15.1)
IMMATURE GRANULOCYTES: 3 %
INR PPP: 1.1
KETONES UR STRIP.AUTO-MCNC: NEGATIVE MG/DL
LACTIC ACID, SEPSIS WHOLE BLOOD: 1.4 MMOL/L (ref 0.5–1.9)
LACTIC ACID, WHOLE BLOOD: 2.6 MMOL/L (ref 0.7–2.1)
LEUKOCYTE ESTERASE UR QL STRIP.AUTO: ABNORMAL
LYMPHOCYTES # BLD: 13 % (ref 24–44)
MCH RBC QN AUTO: 29.5 PG (ref 25.2–33.5)
MCHC RBC AUTO-ENTMCNC: 31.4 G/DL (ref 28.4–34.8)
MCV RBC AUTO: 94.1 FL (ref 82.6–102.9)
MONOCYTES # BLD: 7 % (ref 1–7)
MORPHOLOGY: NORMAL
MYOGLOBIN SERPL-MCNC: 40 NG/ML (ref 25–58)
NITRITE UR QL STRIP.AUTO: NEGATIVE
NRBC AUTOMATED: 0 PER 100 WBC
O2 SAT, VEN: 60 % (ref 60–85)
PARTIAL THROMBOPLASTIN TIME: 26.6 SEC (ref 20.5–30.5)
PCO2, VEN: 35.7 MM HG (ref 41–51)
PDW BLD-RTO: 14.1 % (ref 11.8–14.4)
PH VENOUS: 7.45 (ref 7.32–7.43)
PLATELET # BLD AUTO: 144 K/UL (ref 138–453)
PMV BLD AUTO: 10.7 FL (ref 8.1–13.5)
PO2, VEN: 29.4 MM HG (ref 30–50)
POC BUN: 14 MG/DL (ref 8–26)
POC CHLORIDE: 99 MMOL/L (ref 98–107)
POC CREATININE: 1.04 MG/DL (ref 0.51–1.19)
POC HEMATOCRIT: 43 % (ref 36–46)
POC HEMOGLOBIN: 14.7 G/DL (ref 12–16)
POC IONIZED CALCIUM: 1.13 MMOL/L (ref 1.15–1.33)
POC LACTIC ACID: 1.92 MMOL/L (ref 0.56–1.39)
POC POTASSIUM: 4.7 MMOL/L (ref 3.5–4.5)
POC SODIUM: 127 MMOL/L (ref 138–146)
POC TCO2: 24 MMOL/L (ref 22–30)
POSITIVE BASE EXCESS, VEN: 1 (ref 0–3)
POTASSIUM SERPL-SCNC: 5 MMOL/L (ref 3.7–5.3)
PROCALCITONIN SERPL-MCNC: 0.09 NG/ML
PROT UR STRIP.AUTO-MCNC: 8.5 MG/DL (ref 5–8)
PROT UR STRIP.AUTO-MCNC: NEGATIVE MG/DL
PROTHROMBIN TIME: 11.3 SEC (ref 9.1–12.3)
RBC # BLD: 4.57 M/UL (ref 3.95–5.11)
RBC CLUMPS #/AREA URNS AUTO: ABNORMAL /HPF (ref 0–4)
SARS-COV-2 RDRP RESP QL NAA+PROBE: NOT DETECTED
SEG NEUTROPHILS: 76 % (ref 36–66)
SEGMENTED NEUTROPHILS ABSOLUTE COUNT: 3.65 K/UL (ref 1.8–7.7)
SODIUM SERPL-SCNC: 134 MMOL/L (ref 135–144)
SPECIFIC GRAVITY UA: 1.02 (ref 1–1.03)
SPECIMEN DESCRIPTION: NORMAL
TROPONIN I SERPL DL<=0.01 NG/ML-MCNC: 20 NG/L (ref 0–14)
TROPONIN I SERPL DL<=0.01 NG/ML-MCNC: 24 NG/L (ref 0–14)
TURBIDITY: CLEAR
URINE HGB: NEGATIVE
UROBILINOGEN, URINE: NORMAL
WBC # BLD AUTO: 4.8 K/UL (ref 3.5–11.3)
WBC UA: ABNORMAL /HPF (ref 0–5)

## 2023-02-03 PROCEDURE — 6370000000 HC RX 637 (ALT 250 FOR IP)

## 2023-02-03 PROCEDURE — 6360000002 HC RX W HCPCS: Performed by: STUDENT IN AN ORGANIZED HEALTH CARE EDUCATION/TRAINING PROGRAM

## 2023-02-03 PROCEDURE — 85730 THROMBOPLASTIN TIME PARTIAL: CPT

## 2023-02-03 PROCEDURE — 85025 COMPLETE CBC W/AUTO DIFF WBC: CPT

## 2023-02-03 PROCEDURE — 2580000003 HC RX 258: Performed by: STUDENT IN AN ORGANIZED HEALTH CARE EDUCATION/TRAINING PROGRAM

## 2023-02-03 PROCEDURE — 87635 SARS-COV-2 COVID-19 AMP PRB: CPT

## 2023-02-03 PROCEDURE — 6370000000 HC RX 637 (ALT 250 FOR IP): Performed by: INTERNAL MEDICINE

## 2023-02-03 PROCEDURE — 85014 HEMATOCRIT: CPT

## 2023-02-03 PROCEDURE — 87077 CULTURE AEROBIC IDENTIFY: CPT

## 2023-02-03 PROCEDURE — 6360000002 HC RX W HCPCS: Performed by: INTERNAL MEDICINE

## 2023-02-03 PROCEDURE — 2060000000 HC ICU INTERMEDIATE R&B

## 2023-02-03 PROCEDURE — 81001 URINALYSIS AUTO W/SCOPE: CPT

## 2023-02-03 PROCEDURE — 82330 ASSAY OF CALCIUM: CPT

## 2023-02-03 PROCEDURE — 6370000000 HC RX 637 (ALT 250 FOR IP): Performed by: STUDENT IN AN ORGANIZED HEALTH CARE EDUCATION/TRAINING PROGRAM

## 2023-02-03 PROCEDURE — 83874 ASSAY OF MYOGLOBIN: CPT

## 2023-02-03 PROCEDURE — 85610 PROTHROMBIN TIME: CPT

## 2023-02-03 PROCEDURE — 6360000002 HC RX W HCPCS

## 2023-02-03 PROCEDURE — 80048 BASIC METABOLIC PNL TOTAL CA: CPT

## 2023-02-03 PROCEDURE — 82553 CREATINE MB FRACTION: CPT

## 2023-02-03 PROCEDURE — 83605 ASSAY OF LACTIC ACID: CPT

## 2023-02-03 PROCEDURE — 86140 C-REACTIVE PROTEIN: CPT

## 2023-02-03 PROCEDURE — 2580000003 HC RX 258

## 2023-02-03 PROCEDURE — 82550 ASSAY OF CK (CPK): CPT

## 2023-02-03 PROCEDURE — 82947 ASSAY GLUCOSE BLOOD QUANT: CPT

## 2023-02-03 PROCEDURE — 6360000004 HC RX CONTRAST MEDICATION: Performed by: FAMILY MEDICINE

## 2023-02-03 PROCEDURE — 84145 PROCALCITONIN (PCT): CPT

## 2023-02-03 PROCEDURE — 74177 CT ABD & PELVIS W/CONTRAST: CPT

## 2023-02-03 PROCEDURE — 84520 ASSAY OF UREA NITROGEN: CPT

## 2023-02-03 PROCEDURE — 87040 BLOOD CULTURE FOR BACTERIA: CPT

## 2023-02-03 PROCEDURE — 70450 CT HEAD/BRAIN W/O DYE: CPT

## 2023-02-03 PROCEDURE — 82565 ASSAY OF CREATININE: CPT

## 2023-02-03 PROCEDURE — 82803 BLOOD GASES ANY COMBINATION: CPT

## 2023-02-03 PROCEDURE — 93005 ELECTROCARDIOGRAM TRACING: CPT | Performed by: STUDENT IN AN ORGANIZED HEALTH CARE EDUCATION/TRAINING PROGRAM

## 2023-02-03 PROCEDURE — 71045 X-RAY EXAM CHEST 1 VIEW: CPT

## 2023-02-03 PROCEDURE — 2580000003 HC RX 258: Performed by: INTERNAL MEDICINE

## 2023-02-03 PROCEDURE — 87086 URINE CULTURE/COLONY COUNT: CPT

## 2023-02-03 PROCEDURE — 70498 CT ANGIOGRAPHY NECK: CPT

## 2023-02-03 PROCEDURE — 84484 ASSAY OF TROPONIN QUANT: CPT

## 2023-02-03 PROCEDURE — 80051 ELECTROLYTE PANEL: CPT

## 2023-02-03 PROCEDURE — 99285 EMERGENCY DEPT VISIT HI MDM: CPT

## 2023-02-03 RX ORDER — ONDANSETRON 2 MG/ML
INJECTION INTRAMUSCULAR; INTRAVENOUS
Status: COMPLETED
Start: 2023-02-03 | End: 2023-02-03

## 2023-02-03 RX ORDER — ONDANSETRON 2 MG/ML
4 INJECTION INTRAMUSCULAR; INTRAVENOUS EVERY 6 HOURS PRN
Status: DISCONTINUED | OUTPATIENT
Start: 2023-02-03 | End: 2023-02-08 | Stop reason: HOSPADM

## 2023-02-03 RX ORDER — BUSPIRONE HYDROCHLORIDE 10 MG/1
10 TABLET ORAL 2 TIMES DAILY
Status: DISCONTINUED | OUTPATIENT
Start: 2023-02-03 | End: 2023-02-08 | Stop reason: HOSPADM

## 2023-02-03 RX ORDER — LACTOBACILLUS RHAMNOSUS GG 10B CELL
CAPSULE ORAL 2 TIMES DAILY
Status: DISCONTINUED | OUTPATIENT
Start: 2023-02-03 | End: 2023-02-08 | Stop reason: HOSPADM

## 2023-02-03 RX ORDER — OXYCODONE HYDROCHLORIDE AND ACETAMINOPHEN 5; 325 MG/1; MG/1
1 TABLET ORAL EVERY 6 HOURS PRN
Status: DISCONTINUED | OUTPATIENT
Start: 2023-02-03 | End: 2023-02-06

## 2023-02-03 RX ORDER — CALCIUM CARBONATE 500(1250)
500 TABLET ORAL 2 TIMES DAILY
Status: DISCONTINUED | OUTPATIENT
Start: 2023-02-03 | End: 2023-02-03

## 2023-02-03 RX ORDER — ACETAMINOPHEN 325 MG/1
650 TABLET ORAL EVERY 6 HOURS PRN
Status: DISCONTINUED | OUTPATIENT
Start: 2023-02-03 | End: 2023-02-08 | Stop reason: HOSPADM

## 2023-02-03 RX ORDER — SODIUM CHLORIDE 0.9 % (FLUSH) 0.9 %
5-40 SYRINGE (ML) INJECTION PRN
Status: DISCONTINUED | OUTPATIENT
Start: 2023-02-03 | End: 2023-02-08 | Stop reason: HOSPADM

## 2023-02-03 RX ORDER — SODIUM CHLORIDE 9 MG/ML
INJECTION, SOLUTION INTRAVENOUS CONTINUOUS
Status: DISCONTINUED | OUTPATIENT
Start: 2023-02-04 | End: 2023-02-04

## 2023-02-03 RX ORDER — TROSPIUM CHLORIDE 20 MG/1
20 TABLET, FILM COATED ORAL
Status: DISCONTINUED | OUTPATIENT
Start: 2023-02-04 | End: 2023-02-08

## 2023-02-03 RX ORDER — BISACODYL 10 MG
10 SUPPOSITORY, RECTAL RECTAL DAILY PRN
Status: DISCONTINUED | OUTPATIENT
Start: 2023-02-03 | End: 2023-02-04

## 2023-02-03 RX ORDER — CARBOXYMETHYLCELLULOSE SODIUM 10 MG/ML
1 GEL OPHTHALMIC EVERY 6 HOURS
Status: DISCONTINUED | OUTPATIENT
Start: 2023-02-03 | End: 2023-02-08 | Stop reason: HOSPADM

## 2023-02-03 RX ORDER — GABAPENTIN 300 MG/1
300 CAPSULE ORAL 3 TIMES DAILY
Status: DISCONTINUED | OUTPATIENT
Start: 2023-02-03 | End: 2023-02-08 | Stop reason: HOSPADM

## 2023-02-03 RX ORDER — VITAMIN B COMPLEX
2000 TABLET ORAL DAILY
Status: DISCONTINUED | OUTPATIENT
Start: 2023-02-04 | End: 2023-02-08 | Stop reason: HOSPADM

## 2023-02-03 RX ORDER — SODIUM CHLORIDE 9 MG/ML
INJECTION, SOLUTION INTRAVENOUS PRN
Status: DISCONTINUED | OUTPATIENT
Start: 2023-02-03 | End: 2023-02-08 | Stop reason: HOSPADM

## 2023-02-03 RX ORDER — ACETAMINOPHEN 650 MG/1
650 SUPPOSITORY RECTAL EVERY 6 HOURS PRN
Status: DISCONTINUED | OUTPATIENT
Start: 2023-02-03 | End: 2023-02-08 | Stop reason: HOSPADM

## 2023-02-03 RX ORDER — HEPARIN SODIUM 5000 [USP'U]/ML
5000 INJECTION, SOLUTION INTRAVENOUS; SUBCUTANEOUS EVERY 8 HOURS SCHEDULED
Status: DISCONTINUED | OUTPATIENT
Start: 2023-02-03 | End: 2023-02-08 | Stop reason: HOSPADM

## 2023-02-03 RX ORDER — PANTOPRAZOLE SODIUM 40 MG/1
40 TABLET, DELAYED RELEASE ORAL 2 TIMES DAILY
Status: DISCONTINUED | OUTPATIENT
Start: 2023-02-03 | End: 2023-02-08 | Stop reason: HOSPADM

## 2023-02-03 RX ORDER — CLONAZEPAM 0.5 MG/1
0.5 TABLET ORAL 3 TIMES DAILY PRN
Status: DISCONTINUED | OUTPATIENT
Start: 2023-02-03 | End: 2023-02-04

## 2023-02-03 RX ORDER — ONDANSETRON 4 MG/1
4 TABLET, ORALLY DISINTEGRATING ORAL EVERY 8 HOURS PRN
Status: DISCONTINUED | OUTPATIENT
Start: 2023-02-03 | End: 2023-02-08 | Stop reason: HOSPADM

## 2023-02-03 RX ORDER — GUAIFENESIN 600 MG/1
600 TABLET, EXTENDED RELEASE ORAL DAILY
Status: DISCONTINUED | OUTPATIENT
Start: 2023-02-04 | End: 2023-02-08 | Stop reason: HOSPADM

## 2023-02-03 RX ORDER — ONDANSETRON 2 MG/ML
4 INJECTION INTRAMUSCULAR; INTRAVENOUS ONCE
Status: COMPLETED | OUTPATIENT
Start: 2023-02-03 | End: 2023-02-03

## 2023-02-03 RX ORDER — ESCITALOPRAM OXALATE 10 MG/1
20 TABLET ORAL DAILY
Status: DISCONTINUED | OUTPATIENT
Start: 2023-02-04 | End: 2023-02-08 | Stop reason: HOSPADM

## 2023-02-03 RX ORDER — CETIRIZINE HYDROCHLORIDE 10 MG/1
5 TABLET ORAL DAILY
Status: DISCONTINUED | OUTPATIENT
Start: 2023-02-04 | End: 2023-02-08 | Stop reason: HOSPADM

## 2023-02-03 RX ORDER — METOPROLOL SUCCINATE 25 MG/1
25 TABLET, EXTENDED RELEASE ORAL DAILY
Status: DISCONTINUED | OUTPATIENT
Start: 2023-02-04 | End: 2023-02-08 | Stop reason: HOSPADM

## 2023-02-03 RX ORDER — OXYCODONE HYDROCHLORIDE 5 MG/1
5 TABLET ORAL ONCE
Status: COMPLETED | OUTPATIENT
Start: 2023-02-03 | End: 2023-02-03

## 2023-02-03 RX ORDER — SODIUM CHLORIDE 0.9 % (FLUSH) 0.9 %
5-40 SYRINGE (ML) INJECTION EVERY 12 HOURS SCHEDULED
Status: DISCONTINUED | OUTPATIENT
Start: 2023-02-03 | End: 2023-02-08 | Stop reason: HOSPADM

## 2023-02-03 RX ORDER — 0.9 % SODIUM CHLORIDE 0.9 %
500 INTRAVENOUS SOLUTION INTRAVENOUS ONCE
Status: COMPLETED | OUTPATIENT
Start: 2023-02-03 | End: 2023-02-03

## 2023-02-03 RX ORDER — QUETIAPINE FUMARATE 100 MG/1
100 TABLET, FILM COATED ORAL NIGHTLY
Status: DISCONTINUED | OUTPATIENT
Start: 2023-02-03 | End: 2023-02-08 | Stop reason: HOSPADM

## 2023-02-03 RX ORDER — TRAZODONE HYDROCHLORIDE 100 MG/1
100 TABLET ORAL NIGHTLY
Status: DISCONTINUED | OUTPATIENT
Start: 2023-02-03 | End: 2023-02-07

## 2023-02-03 RX ORDER — LANOLIN ALCOHOL/MO/W.PET/CERES
325 CREAM (GRAM) TOPICAL
Status: DISCONTINUED | OUTPATIENT
Start: 2023-02-04 | End: 2023-02-08 | Stop reason: HOSPADM

## 2023-02-03 RX ORDER — ATORVASTATIN CALCIUM 40 MG/1
40 TABLET, FILM COATED ORAL DAILY
Status: DISCONTINUED | OUTPATIENT
Start: 2023-02-04 | End: 2023-02-08 | Stop reason: HOSPADM

## 2023-02-03 RX ORDER — MAGNESIUM HYDROXIDE/ALUMINUM HYDROXICE/SIMETHICONE 120; 1200; 1200 MG/30ML; MG/30ML; MG/30ML
5 SUSPENSION ORAL PRN
Status: DISCONTINUED | OUTPATIENT
Start: 2023-02-03 | End: 2023-02-08 | Stop reason: HOSPADM

## 2023-02-03 RX ORDER — FLUTICASONE PROPIONATE 50 MCG
1 SPRAY, SUSPENSION (ML) NASAL DAILY
Status: DISCONTINUED | OUTPATIENT
Start: 2023-02-04 | End: 2023-02-08 | Stop reason: HOSPADM

## 2023-02-03 RX ORDER — ACETAMINOPHEN 500 MG
1000 TABLET ORAL ONCE
Status: COMPLETED | OUTPATIENT
Start: 2023-02-03 | End: 2023-02-03

## 2023-02-03 RX ORDER — ALBUTEROL SULFATE 2.5 MG/3ML
2.5 SOLUTION RESPIRATORY (INHALATION) EVERY 6 HOURS PRN
Status: DISCONTINUED | OUTPATIENT
Start: 2023-02-03 | End: 2023-02-08 | Stop reason: HOSPADM

## 2023-02-03 RX ORDER — AMITRIPTYLINE HYDROCHLORIDE 25 MG/1
25 TABLET, FILM COATED ORAL 3 TIMES DAILY
Status: DISCONTINUED | OUTPATIENT
Start: 2023-02-03 | End: 2023-02-07

## 2023-02-03 RX ORDER — POLYETHYLENE GLYCOL 3350 17 G/17G
17 POWDER, FOR SOLUTION ORAL DAILY PRN
Status: DISCONTINUED | OUTPATIENT
Start: 2023-02-03 | End: 2023-02-08 | Stop reason: HOSPADM

## 2023-02-03 RX ADMIN — CARBOXYMETHYLCELLULOSE SODIUM 1 DROP: 10 GEL OPHTHALMIC at 22:42

## 2023-02-03 RX ADMIN — ONDANSETRON 4 MG: 2 INJECTION INTRAMUSCULAR; INTRAVENOUS at 20:02

## 2023-02-03 RX ADMIN — MAGNESIUM HYDROXIDE 30 ML: 400 SUSPENSION ORAL at 22:50

## 2023-02-03 RX ADMIN — TRAZODONE HYDROCHLORIDE 100 MG: 100 TABLET ORAL at 22:49

## 2023-02-03 RX ADMIN — IOPAMIDOL 90 ML: 755 INJECTION, SOLUTION INTRAVENOUS at 12:59

## 2023-02-03 RX ADMIN — Medication 1 CAPSULE: at 22:48

## 2023-02-03 RX ADMIN — MEROPENEM 1000 MG: 1 INJECTION, POWDER, FOR SOLUTION INTRAVENOUS at 16:11

## 2023-02-03 RX ADMIN — OXYCODONE HYDROCHLORIDE 5 MG: 5 TABLET ORAL at 16:11

## 2023-02-03 RX ADMIN — ACETAMINOPHEN 1000 MG: 500 TABLET ORAL at 13:43

## 2023-02-03 RX ADMIN — BUSPIRONE HYDROCHLORIDE 10 MG: 10 TABLET ORAL at 22:46

## 2023-02-03 RX ADMIN — PANTOPRAZOLE SODIUM 40 MG: 40 TABLET, DELAYED RELEASE ORAL at 22:46

## 2023-02-03 RX ADMIN — HEPARIN SODIUM 5000 UNITS: 5000 INJECTION INTRAVENOUS; SUBCUTANEOUS at 22:00

## 2023-02-03 RX ADMIN — AMITRIPTYLINE HYDROCHLORIDE 25 MG: 25 TABLET, FILM COATED ORAL at 22:46

## 2023-02-03 RX ADMIN — Medication 600 MG: at 22:56

## 2023-02-03 RX ADMIN — SODIUM CHLORIDE 500 ML: 9 INJECTION, SOLUTION INTRAVENOUS at 15:38

## 2023-02-03 RX ADMIN — SODIUM CHLORIDE, PRESERVATIVE FREE 10 ML: 5 INJECTION INTRAVENOUS at 22:51

## 2023-02-03 RX ADMIN — QUETIAPINE FUMARATE 100 MG: 100 TABLET ORAL at 22:49

## 2023-02-03 RX ADMIN — OXYCODONE HYDROCHLORIDE AND ACETAMINOPHEN 1 TABLET: 5; 325 TABLET ORAL at 23:12

## 2023-02-03 RX ADMIN — CLONAZEPAM 0.5 MG: 0.5 TABLET ORAL at 23:12

## 2023-02-03 RX ADMIN — MEROPENEM 1000 MG: 1 INJECTION, POWDER, FOR SOLUTION INTRAVENOUS at 21:00

## 2023-02-03 RX ADMIN — GABAPENTIN 300 MG: 300 CAPSULE ORAL at 21:00

## 2023-02-03 ASSESSMENT — PAIN SCALES - WONG BAKER: WONGBAKER_NUMERICALRESPONSE: 4

## 2023-02-03 ASSESSMENT — PAIN DESCRIPTION - LOCATION
LOCATION: HEAD
LOCATION: ABDOMEN

## 2023-02-03 ASSESSMENT — LIFESTYLE VARIABLES
HOW MANY STANDARD DRINKS CONTAINING ALCOHOL DO YOU HAVE ON A TYPICAL DAY: PATIENT DOES NOT DRINK
HOW OFTEN DO YOU HAVE A DRINK CONTAINING ALCOHOL: NEVER

## 2023-02-03 ASSESSMENT — PAIN SCALES - GENERAL
PAINLEVEL_OUTOF10: 8

## 2023-02-03 NOTE — ED NOTES
Pt profusely sweating and states that she does not feel well.   Dr. Sabrina Avila notified and at bedside     [place], [address] [facility]  02/03/23 [id number]

## 2023-02-03 NOTE — ED PROVIDER NOTES
[address] [facility]  ED  eMERGENCY dEPARTMENT eNCOUnter   Attending Attestation     Pt Name: Paulina Escobar  MRN: [id number]  Jordigfmaria luisa 1945  Date of evaluation: 2/3/23       Paulina Escobar is a 68 y.o. female who presents with Altered Mental Status      History: Patient presents with altered mental status, concern for stroke with weakness and sudden onset confusion. Exam: HRRR, lungs CTABL, abdomen soft with diffuse tenderness. Pt awake and alert to person place time, however, continues to say bizarre things. Plan for stroke alert, CT abdomen, AMS workup,  U/a. admission. EG shows normal sinus rhythm with rate 88 beats minute. Normal axis. No ST elevations or depressions. T waves are upright. No blocks arrhythmias. Nonspecific EKG. I performed a history and physical examination of the patient and discussed management with the resident. I reviewed the residents note and agree with the documented findings and plan of care. Any areas of disagreement are noted on the chart. I was personally present for the key portions of any procedures. I have documented in the chart those procedures where I was not present during the key portions. I have personally reviewed all images and agree with the resident's interpretation. I have reviewed the emergency nurses triage note. I agree with the chief complaint, past medical history, past surgical history, allergies, medications, social and family history as documented unless otherwise noted below. Documentation of the HPI, Physical Exam and Medical Decision Making performed by medical students or scribes is based on my personal performance of the HPI, PE and MDM. For Phys Assistant/ Nurse Practitioner cases/documentation I have had a face to face evaluation of this patient and have completed at least one if not all key elements of the E/M (history, physical exam, and MDM). Additional findings are as noted.     For APC cases I have personally evaluated and examined the patient in conjunction with the APC and agree with the treatment plan and disposition of the patient as recorded by the APC.     Emil Carmona MD  Attending Emergency  Physician       Nicky Reeves MD  02/03/23 [id number]

## 2023-02-03 NOTE — ED NOTES
Pt respirations are even and unlabored, pt is alert and oriented X 4, speaking in complete sentences, bed is in the lowest position, call light is within reach, NAD noted. Will continue to follow plan of care.         Karthikeyan Coello RN  02/03/23 [id number]

## 2023-02-03 NOTE — ED NOTES
Labeled blood specimens sent to lab via tube system.     [x] Lavender   [] on ice   [x] Blue   [x] Green/yellow  [x] Green/black [] on ice  [] Pink  [] Red  [] Yellow  [] on ice  [x] Blood Cultures      Debby Watts RN  02/03/23 6769

## 2023-02-03 NOTE — ED NOTES
Neeru Levin RN, Hang Castro RN, Dr. Tomeka Jackson, Neuro, and Dr. Fahad Alba at bedside.       Luan Lakhani RN  02/03/23 0182

## 2023-02-03 NOTE — ED PROVIDER NOTES
[facility]  Emergency Department  Emergency Medicine Resident Sign-out     Care of Tatyana Nguyen was assumed from Dr. Sonal Massey and is being seen for Altered Mental Status  . The patient's initial evaluation and plan have been discussed with the prior provider who initially evaluated the patient.      EMERGENCY DEPARTMENT COURSE / MEDICAL DECISION MAKING:       MEDICATIONS GIVEN:  Orders Placed This Encounter   Medications    iopamidol (ISOVUE-370) 76 % injection 90 mL    acetaminophen (TYLENOL) tablet 1,000 mg    meropenem (MERREM) 1,000 mg in sodium chloride 0.9 % 100 mL IVPB (mini-bag)     Order Specific Question:   Antimicrobial Indications     Answer:   Pneumonia (CAP)     Order Specific Question:   Antimicrobial Indications     Answer:   Intra-Abdominal Infection     Order Specific Question:   Antimicrobial Indications     Answer:   Urinary Tract Infection    0.9 % sodium chloride bolus    oxyCODONE (ROXICODONE) immediate release tablet 5 mg    ondansetron (ZOFRAN) injection 4 mg    ondansetron (ZOFRAN) 4 MG/2ML injection     Grey Roger: cabinet override    albuterol (PROVENTIL) nebulizer solution 2.5 mg     Order Specific Question:   Initiate RT Bronchodilator Protocol     Answer:   Yes - Inpatient Protocol    aluminum & magnesium hydroxide-simethicone (MAALOX) 200-200-20 MG/5ML suspension 5 mL    amitriptyline (ELAVIL) tablet 25 mg    bisacodyl (DULCOLAX) suppository 10 mg    busPIRone (BUSPAR) tablet 10 mg    carboxymethylcellulose PF (THERATEARS) 1 % ophthalmic gel 1 drop    Vitamin D (CHOLECALCIFEROL) tablet 2,000 Units    clonazePAM (KLONOPIN) tablet 0.5 mg    escitalopram (LEXAPRO) tablet 20 mg    ferrous sulfate (FE TABS 325) EC tablet 325 mg    trospium (SANCTURA) tablet 20 mg    fluticasone (FLONASE) 50 MCG/ACT nasal spray 1 spray    gabapentin (NEURONTIN) capsule 300 mg    guaiFENesin (MUCINEX) extended release tablet 600 mg    lactobacillus (CULTURELLE) capsule    cetirizine (ZYRTEC) tablet 5 mg    magnesium hydroxide (MILK OF MAGNESIA) 400 MG/5ML suspension 30 mL    metoprolol succinate (TOPROL XL) extended release tablet 25 mg    oxyCODONE-acetaminophen (PERCOCET) 5-325 MG per tablet 1 tablet    DISCONTD: calcium elemental (OSCAL) tablet 500 mg    pantoprazole (PROTONIX) tablet 40 mg    polyethylene glycol (GLYCOLAX) packet 17 g    QUEtiapine (SEROQUEL) tablet 100 mg    atorvastatin (LIPITOR) tablet 40 mg    traZODone (DESYREL) tablet 100 mg    sodium chloride flush 0.9 % injection 5-40 mL    sodium chloride flush 0.9 % injection 5-40 mL    0.9 % sodium chloride infusion    OR Linked Order Group     ondansetron (ZOFRAN-ODT) disintegrating tablet 4 mg     ondansetron (ZOFRAN) injection 4 mg    OR Linked Order Group     acetaminophen (TYLENOL) tablet 650 mg     acetaminophen (TYLENOL) suppository 650 mg    heparin (porcine) injection 5,000 Units    bisacodyl (DULCOLAX) EC tablet 5 mg    meropenem (MERREM) 1,000 mg in sodium chloride 0.9 % 100 mL IVPB (mini-bag)     Order Specific Question:   Antimicrobial Indications     Answer:   Urinary Tract Infection     Order Specific Question:   UTI duration of therapy     Answer:   7 days     Order Specific Question:   Suspected Organism(s)     Answer:   ESBL    calcium carbonate tablet 600 mg       LABS / RADIOLOGY:     Labs Reviewed   STROKE PANEL - Abnormal; Notable for the following components:       Result Value    Creatinine 1.09 (*)     Est, Glom Filt Rate 52 (*)     Sodium 134 (*)     Immature Granulocytes 3 (*)     Seg Neutrophils 76 (*)     Lymphocytes 13 (*)     Absolute Lymph # 0.62 (*)     Troponin, High Sensitivity 20 (*)     All other components within normal limits   C-REACTIVE PROTEIN - Abnormal; Notable for the following components:    CRP 29.4 (*)     All other components within normal limits   PROCALCITONIN - Abnormal; Notable for the following components:    Procalcitonin 0.09 (*)     All other components within normal limits URINALYSIS WITH MICROSCOPIC - Abnormal; Notable for the following components:    pH, UA 8.5 (*)     Leukocyte Esterase, Urine TRACE (*)     Bacteria, UA MODERATE (*)     All other components within normal limits   ELECTROLYTES PLUS - Abnormal; Notable for the following components:    POC Sodium 127 (*)     POC Potassium 4.7 (*)     Anion Gap 5 (*)     All other components within normal limits   CALCIUM, IONIC (POC) - Abnormal; Notable for the following components:    POC Ionized Calcium 1.13 (*)     All other components within normal limits   LACTIC ACID - Abnormal; Notable for the following components:    Lactic Acid, Whole Blood 2.6 (*)     All other components within normal limits   TROPONIN - Abnormal; Notable for the following components:    Troponin, High Sensitivity 24 (*)     All other components within normal limits   VENOUS BLOOD GAS, POINT OF CARE - Abnormal; Notable for the following components:    pH, Nimesh 7.448 (*)     pCO2, Nimesh 35.7 (*)     pO2, Nimesh 29.4 (*)     All other components within normal limits   LACTIC ACID,POINT OF CARE - Abnormal; Notable for the following components:    POC Lactic Acid 1.92 (*)     All other components within normal limits   COVID-19, RAPID   CULTURE, BLOOD 1   CULTURE, BLOOD 1   CULTURE, URINE   LACTATE, SEPSIS   HGB/HCT   BASIC METABOLIC PANEL W/ REFLEX TO MG FOR LOW K   CBC WITH AUTO DIFFERENTIAL   CREATININE W/GFR POINT OF CARE   UREA N (POC)   POCT GLUCOSE       CT HEAD WO CONTRAST    Result Date: 2/3/2023  EXAMINATION: CT OF THE HEAD WITHOUT CONTRAST  2/3/2023 12:42 pm TECHNIQUE: CT of the head was performed without the administration of intravenous contrast. Automated exposure control, iterative reconstruction, and/or weight based adjustment of the mA/kV was utilized to reduce the radiation dose to as low as reasonably achievable.  COMPARISON: CT head January 1, 2023 HISTORY: ORDERING SYSTEM PROVIDED HISTORY: slurred speech TECHNOLOGIST PROVIDED HISTORY: slurred speech Decision Support Exception - unselect if not a suspected or confirmed emergency medical condition->Emergency Medical Condition (MA) Reason for Exam: slurred speech FINDINGS: BRAIN/VENTRICLES: There is mild parenchymal volume loss. There is periventricular white matter low attenuation, likely related to mild chronic microvascular disease. There is no acute intracranial hemorrhage, mass effect or midline shift. No abnormal extra-axial fluid collection. The gray-white differentiation is maintained without evidence of an acute infarct. There is no hydrocephalus. ORBITS: The visualized portion of the orbits demonstrate no acute abnormality. SINUSES: The visualized paranasal sinuses and mastoid air cells demonstrate no acute abnormality. SOFT TISSUES/SKULL:  No acute abnormality of the visualized skull or soft tissues. No acute intracranial abnormality. Mild parenchymal volume loss. Mild chronic microvascular disease. Results were reported to Dr. Te Andrade by radiology results communication at 1:08 p.m. on February 3, 2023. CT ABDOMEN PELVIS W IV CONTRAST Additional Contrast? None    Result Date: 2/3/2023  EXAMINATION: CT OF THE ABDOMEN AND PELVIS WITH CONTRAST 2/3/2023 12:42 pm TECHNIQUE: CT of the abdomen and pelvis was performed with the administration of intravenous contrast. Multiplanar reformatted images are provided for review. Automated exposure control, iterative reconstruction, and/or weight based adjustment of the mA/kV was utilized to reduce the radiation dose to as low as reasonably achievable. COMPARISON: 01/01/2023 HISTORY: ORDERING SYSTEM PROVIDED HISTORY: febrile, abdomen mass TECHNOLOGIST PROVIDED HISTORY: febrile, abdomen mass Reason for Exam: febrile, abdomen mass FINDINGS: LOWER CHEST: Axial hiatal hernia containing part of the stomach. Linear atelectatic changes within the lower lobes. KIDNEYS AND URINARY TRACT: No renal calculi are identified.  There is no evidence for hydronephrosis. The ureters are of normal course and caliber. Right kidney is not visualized in the right renal fossa, unchanged. Simple cyst within the left kidney. ORGANS: Fatty liver. Status post cholecystectomy. Visualized portions of the liver, spleen, pancreas, gallbladder, and adrenal glands demonstrate no acute abnormality. GI/BOWEL: No bowel obstruction. No evidence of acute appendicitis. Reported history of abdominal mass. No discrete mass is noted within the abdomen. There is significant wall thickening noted within the ascending colon which was not present on prior examination. Finding may be related to colitis. There is large amount of stool throughout the transverse colon. Graceann Gillespie PELVIS: The bladder and pelvic organs are unremarkable. Status posthysterectomy. PERITONEUM/RETROPERITONEUM: No free air or free fluid is noted. No pathologically enlarged lymphadenopathy. The vasculature do not demonstrate acute abnormality. BONES/SOFT TISSUES: The osseous structures demonstrate no acute abnormality. Status post right hip arthroplasty. Moderate levoscoliosis. There is significant wall thickening noted within the ascending colon which was not present on prior examination. Finding may be related to colitis. Large arge axial hiatal hernia. status post right nephrectomy, cholecystectomy and hysterectomy. Fatty liver. Extensive stool throughout the transverse colon. XR CHEST PORTABLE    Result Date: 2/3/2023  EXAMINATION: ONE X-RAY VIEW OF THE CHEST 2/3/2023 1:21 pm COMPARISON: 01/05/2023 HISTORY: ORDERING SYSTEM PROVIDED HISTORY: sepsis TECHNOLOGIST PROVIDED HISTORY: sepsis FINDINGS: Heart size is stable. Mediastinal contours are unchanged. There is improved aeration in the left base with mild residual atelectasis/infiltrate. Patulous esophagus noted. No new airspace consolidation. No pneumothorax or pleural effusion.      Mildly improved aeration at the left base with mild residual atelectasis/infiltrate. XR CHEST PORTABLE    Result Date: 1/5/2023  EXAMINATION: ONE XRAY VIEW OF THE CHEST 1/5/2023 11:44 am COMPARISON: 01/02/2023 HISTORY: ORDERING SYSTEM PROVIDED HISTORY: increased O2 demands with Covid TECHNOLOGIST PROVIDED HISTORY: increased O2 demands with Covid FINDINGS: Cardiomediastinal silhouette stable. Left basilar opacification. No pulmonary vascular congestion or edema. No pneumothorax. Left lower lobe atelectasis or pneumonia     CTA HEAD NECK W CONTRAST    Result Date: 2/3/2023  EXAMINATION: CTA OF THE HEAD AND NECK WITH CONTRAST 2/3/2023 12:54 pm: TECHNIQUE: CTA of the head and neck was performed with the administration of intravenous contrast. Multiplanar reformatted images are provided for review. MIP images are provided for review. Stenosis of the internal carotid arteries measured using NASCET criteria. Automated exposure control, iterative reconstruction, and/or weight based adjustment of the mA/kV was utilized to reduce the radiation dose to as low as reasonably achievable. COMPARISON: Noncontrast CT head from earlier today, CTA head and neck November 23, 2021 HISTORY: ORDERING SYSTEM PROVIDED HISTORY: AMS, speech difficulty TECHNOLOGIST PROVIDED HISTORY: AMS, speech difficulty Decision Support Exception - unselect if not a suspected or confirmed emergency medical condition->Emergency Medical Condition (MA) Reason for Exam: ams stroke speech difficulty FINDINGS: CTA NECK: AORTIC ARCH/ARCH VESSELS: No dissection or arterial injury. No significant stenosis of the brachiocephalic or subclavian arteries. CAROTID ARTERIES: There is mild stenosis in the proximal right internal carotid artery. There is 50% stenosis in the proximal left internal carotid artery. No dissection, arterial injury, or hemodynamically significant stenosis by NASCET criteria. VERTEBRAL ARTERIES: No dissection, arterial injury, or significant stenosis. SOFT TISSUES: There is mild emphysema. There is mild dependent atelectasis in the bilateral posterior lungs. No cervical or superior mediastinal lymphadenopathy. The larynx and pharynx are unremarkable. No acute abnormality of the salivary and thyroid glands. BONES: No acute osseous abnormality. There are moderate degenerative changes in the cervical spine. CTA HEAD: ANTERIOR CIRCULATION: No significant stenosis of the intracranial internal carotid, anterior cerebral, or middle cerebral arteries. No aneurysm. POSTERIOR CIRCULATION: No significant stenosis of the vertebral, basilar, or posterior cerebral arteries. No aneurysm. OTHER: No dural venous sinus thrombosis on this non-dedicated study. BRAIN: No mass effect or midline shift. No extra-axial fluid collection. The gray-white differentiation is maintained. 50% stenosis in the proximal left internal carotid artery. Mild stenosis in the proximal right internal carotid artery. Otherwise, no acute abnormality or flow-limiting stenosis in the remainder of the major arteries of the head and neck. RECENT VITALS:     Temp: 98.5 °F (36.9 °C),  Heart Rate: 80, Resp: 15, BP: (!) 111/50, SpO2: 96 %    This patient is a 68 y.o. Female with recent UTI, ESBL, pneumonia and a recent COVID diagnosis. Patient per  was acting slightly off baseline today with noted slurred speech at 9 AM.  Stroke alert was called on arrival, patient otherwise had no focal neurological deficits. NIH 2.  CT head and CTA unremarkable. CT abdomen showing colitis and a moderate stool burden. Chest x-ray showing pneumonia. UA suggestive of UTI. Concern for metabolic encephalopathy and/or delirium secondary to infectious etiology. Meropenem initiated. Pending second troponin. Admission.     ED Course as of 02/03/23 2322 Fri Feb 03, 2023   1242 NIH score 2 [TJ]   1243 Temp(!): 102 °F (38.9 °C) [TJ]   1313 Lactic Acid, Whole Blood(!): 2.6 [TJ]   1313 POC Sodium(!): 127 [TJ]   1348 Bacteria, UA(!): MODERATE [TJ] 1348 WBC, UA: 10 TO 20 [TJ]   1348 RBC, UA: 5 TO 10 [TJ]   1348 Leukocyte Esterase, Urine(!): TRACE [TJ]   1537 Troponin, High Sensitivity(!): 24 [TJ]      ED Course User Index  [TJ] Claudine Hernandez MD     OUTSTANDING TASKS / RECOMMENDATIONS:    2nd troponin  Admission     FINAL IMPRESSION:     1. Altered mental status, unspecified altered mental status type    2. Colitis    3. Pneumonia due to infectious organism, unspecified laterality, unspecified part of lung    4. Acute cystitis without hematuria        DISPOSITION:         DISPOSITION:  []  Discharge   []  Transfer -    [x]  Admission -  Internal medicine   []  Against Medical Advice   []  Eloped   FOLLOW-UP: No follow-up provider specified.    DISCHARGE MEDICATIONS: Current Discharge Medication List             Lesia Ruiz MD  Emergency Medicine Resident  [facility]        Lesia Ruiz MD  Resident  02/03/23 2491

## 2023-02-03 NOTE — ED NOTES
Pt respirations are even and unlabored, pt is alert and oriented X 4, speaking in complete sentences, bed is in the lowest position, call light is within reach, NAD noted. Will continue to follow plan of care.         Michael Gabriel RN  02/03/23 [id number]

## 2023-02-03 NOTE — PROGRESS NOTES
[address] [facility] [address]     Emergency/Trauma Note    PATIENT NAME: Cruz Villanueva    Shift date: 02/03/2023  Shift day: Friday   Shift # 1    Room # 24/24     Name: Curz Villanueva            Age: 68 y.o. Gender: female          Adventism: Orthodox   Place of [employer]:     Trauma/Incident type: Stroke Alert  Admit Date & Time: 2/3/2023 12:30 PM  TRAUMA NAME: None    PATIENT/EVENT DESCRIPTION:  Cruz Villanueva is a 68 y.o. female who arrived ED as stroke alert. Patient was awake and alert when  visited. Patient to be admitted to 24/24. SPIRITUAL ASSESSMENT-INTERVENTION-OUTCOME:  Patient was raised Orthodox and very receptive to spiritual care. Family was not present at the time. Patient said family knew she came to [facility]. RAYNA's.  offered support, prayed with patient and reassured her that she was in good hands. Patient expressed gratitude for the spiritual and emotional support she received. PATIENT BELONGINGS:  No belongings noted    ANY BELONGINGS OF SIGNIFICANT VALUE NOTED:  Unknown    REGISTRATION STAFF NOTIFIED? Yes    WHAT IS YOUR SPIRITUAL CARE PLAN FOR THIS PATIENT?:  Follow up visits recommended for ongoing assessment of patient's condition and for more prayers and support. Electronically signed by Fr. Cory Echeverria, on 2/3/2023 at 2:38 PM.  Bob Miller  [phone]

## 2023-02-03 NOTE — ED PROVIDER NOTES
[address] ED  Emergency Department Encounter  Emergency Medicine Resident     Pt Name:Nina Jacquetta Brunner  MRN: [id number]  [place] 1945  Date of evaluation: 2/3/23  PCP:  Rajwinder Wyman DO      CHIEF COMPLAINT       Chief Complaint   Patient presents with    Altered Mental Status       HISTORY OF PRESENT ILLNESS  (Location/Symptom, Timing/Onset, Context/Setting, Quality, Duration, Modifying Factors, Severity.)      Margaret Valenzuela is a 68 y.o. female who presents with confusion, recent UTI, ESBL, recent pneumonia, recent COVID diagnosis. Started acting confused today around 7 AM and then  noted slurred speech at 9 AM.  Was called as a stroke alert on arrival.  No focal neurologic deficits. She does feel as if her left side of her body may be weak. She did come in with a fever of 102. Not currently taking any antibiotics at this time. She denies chest pain or shortness of breath. She does have abdominal pain.     PAST MEDICAL / SURGICAL / SOCIAL / FAMILY HISTORY      has a past medical history of A-fib ([facility] [address] Acquired absence of kidney, Adult hypertrophic pyloric stenosis, Agoraphobia with panic disorder, Agoraphobia without history of panic disorder, Allergic rhinitis, Anemia, unspecified, Anxiety, Anxiety disorder, Arthritis, Atrial fibrillation (HCC), Back pain, Bronchitis, Caffeine use, Cardiomegaly, Carpal tunnel syndrome, Cataracts, bilateral, Centriacinar emphysema (HCC), Chronic kidney disease, stage III (moderate) (HCC), Chronic pain, Constipation, Constipation, COPD (chronic obstructive pulmonary disease) ([facility] [address] Cystitis with hematuria, Depression, Diaphragmatic hernia without obstruction or gangrene, Diarrhea, Difficulty walking, Dry eye syndrome of unspecified lacrimal gland, Esophageal obstruction, FOM (frequency of micturition), Foot drop, right foot, Gastro-esophageal reflux disease with esophagitis, with bleeding, GERD (gastroesophageal reflux disease), H/O gastric ulcer, HTN (hypertension), Hyperlipidemia, Hypertension, essential, Klebsiella pneumoniae (k. pneumoniae) as the cause of diseases classified elsewhere, Major depression, recurrent ([facility] [address] Mitral valve prolapse, Mixed hyperlipidemia, Mumps, Muscle weakness (generalized), MVP (mitral valve prolapse), Old myocardial infarction, Personal history of disease of digestive system, Psychophysiological insomnia, Recurrent UTI, S/P PICC central line placement, Sepsis ([facility] [address] Sinusitis, Tracheostomy in place [facility]), Ulcerative esophagitis, Urinary tract infection, site not specified, Uses wheelchair, Wears glasses, and Wellness examination. has a past surgical history that includes Hysterectomy; total nephrectomy; Tonsillectomy; Appendectomy; Cystoscopy; Ovary removal; Tubal ligation; rhinoplasty; Carpal tunnel release; Hand surgery; Total hip arthroplasty; eye surgery; Colonoscopy; joint replacement (Right); Gastric fundoplication (N/A, 64/86/2206); hc cath power picc triple (03/04/2020); Upper gastrointestinal endoscopy (N/A, 03/17/2020); tracheostomy (N/A, 03/27/2020); Gastrostomy tube placement (N/A, 03/27/2020); tracheostomy (N/A, 04/02/2020); Upper gastrointestinal endoscopy (N/A, 05/18/2020); Upper gastrointestinal endoscopy (05/18/2020); ERCP (05/21/2020); ERCP (05/21/2020); ERCP (05/21/2020); ERCP (05/21/2020); Cholecystectomy, laparoscopic (N/A, 05/22/2020); Upper gastrointestinal endoscopy (N/A, 07/06/2020); Upper gastrointestinal endoscopy (N/A, 11/09/2020); Upper gastrointestinal endoscopy (02/08/2021); Gastrostomy tube placement (N/A, 2/8/2021); ERCP (N/A, 2/8/2021); ERCP (2/8/2021); Upper gastrointestinal endoscopy (N/A, 11/24/2021); hernia repair; Colonoscopy (N/A, 1/28/2022); IR NONTUNNELED VASCULAR CATHETER > 5 YEARS (5/10/2022); Upper gastrointestinal endoscopy (N/A, 9/8/2022); and Upper gastrointestinal endoscopy (N/A, 10/11/2022). Social History     Socioeconomic History    Marital status:   Spouse name: Not on file    Number of children: 2    Years of education: Not on file    Highest education level: Not on file   Occupational History    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   Tobacco Use    Smoking status: Former     Packs/day: 1.00     Years: 50.00     Pack years: 50.00     Types: Cigarettes    Smokeless tobacco: Never    Tobacco comments:     quit 1 year ago    Vaping Use    Vaping Use: Former    Substances: Always   Substance and Sexual Activity    Alcohol use: No    Drug use: No    Sexual activity: Never   Other Topics Concern    Not on file   Social History Narrative    Not on file     Social Determinants of Health     Financial Resource Strain: Not on file   Food Insecurity: Not on file   Transportation Needs: Not on file   Physical Activity: Not on file   Stress: Not on file   Social Connections: Not on file   Intimate Partner Violence: Not on file   Housing Stability: Not on file       Family History   Problem Relation Age of Onset    Cancer Mother         uterine    Heart Attack Mother     Heart Attack Father     Other Maternal Grandfather         foot gangrene    Other Paternal Grandmother         bleeding ulcers    Other Paternal Grandfather         lung cancer       Allergies:  Prednisone, Compazine [prochlorperazine maleate], Penicillin v potassium, and Penicillins    Home Medications:  Prior to Admission medications    Medication Sig Start Date End Date Taking?  Authorizing Provider   ondansetron (ZOFRAN-ODT) 4 MG disintegrating tablet Take 1 tablet by mouth every morning (before breakfast) 2/3/23   Antoine Houser MD   pantoprazole (PROTONIX) 40 MG tablet Take 1 tablet by mouth 2 times daily 2/3/23   Sharon Armando MD   oxyCODONE-acetaminophen (PERCOCET) 5-325 MG per tablet  1/29/23   Historical Provider, MD   amitriptyline (ELAVIL) 25 MG tablet Take 1 tablet by mouth in the morning, at noon, and at bedtime 12/27/22   Historical Provider, MD   fesoterodine (TOVIAZ) 4 MG TB24 ER tablet Take 1 tablet by mouth 12/5/22   Historical Provider, MD   gabapentin (NEURONTIN) 300 MG capsule Take 1 capsule by mouth in the morning, at noon, and at bedtime. 12/30/22   Historical Provider, MD   escitalopram (LEXAPRO) 20 MG tablet Take 1 tablet by mouth daily 12/5/22   Historical Provider, MD   QUEtiapine (SEROQUEL) 50 MG tablet Take 1 tablet by mouth daily In the morning 12/19/22   Historical Provider, MD   Sodium Phosphates (FLEET) 7-19 GM/118ML Place 1 enema rectally once as needed    Historical Provider, MD   aluminum & magnesium hydroxide-simethicone (MAALOX) 200-200-20 MG/5ML SUSP suspension Take 5 mLs by mouth as needed for Indigestion    Historical Provider, MD   traZODone (DESYREL) 100 MG tablet Take 100 mg by mouth nightly    Historical Provider, MD   ondansetron (ZOFRAN) 4 MG tablet Take 4 mg by mouth every 8 hours as needed for Nausea or Vomiting    Historical Provider, MD   albuterol (PROVENTIL) (2.5 MG/3ML) 0.083% nebulizer solution Take 2.5 mg by nebulization every 6 hours as needed for Wheezing    Historical Provider, MD   carboxymethylcellulose 1 % ophthalmic solution Place 1 drop into both eyes in the morning and 1 drop at noon and 1 drop in the evening and 1 drop before bedtime. Historical Provider, MD   magnesium hydroxide (MILK OF MAGNESIA) 400 MG/5ML suspension Take 30 mLs by mouth Give 30ml by mouth as needed for constipation no BM x3 days    Historical Provider, MD   acetaminophen (TYLENOL) 500 MG tablet Take 1,000 mg by mouth in the morning and at bedtime    Historical Provider, MD   Cholecalciferol (VITAMIN D) 50 MCG (2000 UT) CAPS capsule Take 1 capsule by mouth daily 5/10/22   Nilay Goetz DO   guaiFENesin (MUCINEX) 600 MG extended release tablet Take 400 mg by mouth in the morning. Indications: Cough. As needed for mucus.     Historical Provider, MD   Lactobacillus TABS Take 1 tablet by mouth 2 times daily Take One tablet by mouth two times daily    Historical Provider, MD   fluticasone (FLONASE) 50 MCG/ACT nasal spray 1 spray by Each Nostril route daily    Historical Provider, MD   loratadine (CLARITIN) 10 MG capsule Take 10 mg by mouth daily    Historical Provider, MD   clonazePAM (KLONOPIN) 0.5 MG tablet Take 0.5 mg by mouth 3 times daily as needed (tid). Historical Provider, MD   metoclopramide (REGLAN) 5 MG tablet Take 1 tablet by mouth 4 times daily 1/4/22   Antoine Houser MD   polyethylene glycol (GLYCOLAX) 17 g packet Take 17 g by mouth daily as needed for Constipation 12/19/21   Tracee Ferreira MD   QUEtiapine (SEROQUEL) 100 MG tablet Take 1 tablet by mouth nightly for 3 days 12/19/21 1/2/23  Tracee Ferreira MD   Folic Acid-Cholecalciferol 1-2000 MG-UNIT TABS Take by mouth    Historical Provider, MD   busPIRone (BUSPAR) 10 MG tablet Take 1 tablet by mouth 2 times daily 11/30/21   Tim Tomlinson MD   bisacodyl (DULCOLAX) 10 MG suppository Place 10 mg rectally daily as needed for Constipation PRN for constipation no BM x4 days. Historical Provider, MD   ferrous sulfate (FE TABS 325) 325 (65 Fe) MG EC tablet Take 1 tablet by mouth daily (with breakfast) 12/16/20   MARGOTH Zhang - SREE   metoprolol succinate (TOPROL XL) 25 MG extended release tablet Take 1 tablet by mouth daily 5/30/20   Lisette Gonzalez MD   sucralfate (CARAFATE) 1 GM tablet Take 1 tablet by mouth 4 times daily 4/6/20   Samara Cisneros MD   Oyster Shell 500 MG TABS Take 500 mg by mouth 2 times daily     Historical Provider, MD   simvastatin (ZOCOR) 40 MG tablet Take 40 mg by mouth nightly.     Historical Provider, MD         REVIEW OF SYSTEMS       Review of Systems  Confusion, slurred speech, abdominal pain, fever    PHYSICAL EXAM      INITIAL VITALS:   BP (!) 114/97   Pulse 84   Temp 98.5 °F (36.9 °C) (Oral)   Resp 14   Ht 5' 2\" (1.575 m)   Wt 181 lb (82.1 kg)   SpO2 91%   BMI 33.11 kg/m²     Physical Exam  Confusion, slurred speech, NIH score 2, no other focal neurologic deficits, belly soft but tender, no peritoneal signs, airway intact, breath sounds clear, heart sounds normal    DDX/DIAGNOSTIC RESULTS / EMERGENCY DEPARTMENT COURSE / MDM     Medical Decision Making  Amount and/or Complexity of Data Reviewed  Labs: ordered. Decision-making details documented in ED Course. Radiology: ordered. ECG/medicine tests: ordered. Risk  OTC drugs. This is a 31-year-old female coming in today for evaluation of confusion. She was brought in as a stroke alert. Confusion started on 7 AM her slurred speech then began at 9 AM.  Recent admission for COVID and pneumonia. Patient does have history of ESBL urinary tract infection. She is febrile and confused on exam.  NIH score 2. Stroke team at bedside. CT head CTA head and neck negative. Her CT abdomen did show that she has colitis with a moderate stool burden. Her chest x-ray that shows that she has a pneumonia. In her urinalysis is suggestive of a UTI. The patient likely has metabolic encephalopathy secondary to multiple infectious etiologies. Patient be started on meropenem and admitted to the medicine service. Patient signed out to Dr. Carolyn Chand pending second trop. EMERGENCY DEPARTMENT COURSE:    ED Course as of 02/03/23 1459   Fri Feb 03, 2023   1242 NIH score 2 [TJ]   1243 Temp(!): 102 °F (38.9 °C) [TJ]   1313 Lactic Acid, Whole Blood(!): 2.6 [TJ]   1313 POC Sodium(!): 127 [TJ]   1348 Bacteria, UA(!): MODERATE [TJ]   1348 WBC, UA: 10 TO 20 [TJ]   1348 RBC, UA: 5 TO 10 [TJ]   1348 Leukocyte Esterase, Urine(!): TRACE [TJ]      ED Course User Index  [TJ] Bijan House MD       PROCEDURES:      Procedures    CONSULTS:  None        FINAL IMPRESSION      1. Altered mental status, unspecified altered mental status type    2. Colitis    3. Pneumonia due to infectious organism, unspecified laterality, unspecified part of lung    4.  Acute cystitis without hematuria          DISPOSITION / PLAN     DISPOSITION    Admission    PATIENT REFERRED TO:  No follow-up provider specified.     DISCHARGE MEDICATIONS:  New Prescriptions    No medications on file       Nichole Barrios MD  Emergency Medicine Resident    (Please note that portions of thisnote were completed with a voice recognition program.  Efforts were made to edit the dictations but occasionally words are mis-transcribed.)       Nichole Barrios MD  Resident  02/03/23 [id number]

## 2023-02-03 NOTE — ED NOTES
Pt arrives to ED 24 via EMS. Pt lives at a nursing home and LKW was 0700. EMS reports no blood thinners, A&O x 3 and slurred speech. Pt showing signs of expressive aphasia and confusion intermittently. Pt respirations even and unlabored, bed in lowest position, call light within reach. Will continue to follow plan of care.       Ania Luis RN  02/03/23 [id number]

## 2023-02-03 NOTE — CONSULTS
Department of Endovascular Neurosurgery                                                                             Resident Consult Note  Stroke Alert paged @ 1223PM (ETA 9 min)  ER Room # 24  Arrival to patient bedside @ 1234PM        Reason for Consult:  confusion/AMS  Requesting Physician:  ED  Endovascular Neurosurgeon:   []Dr. Jan Schmidt  [x]Dr. Panfilo Lanier  []Dr. Anuradha Rosas   []    History Obtained From:  patient, electronic medical record, med staff    CHIEF COMPLAINT:       AMS    HISTORY OF PRESENT ILLNESS:       The patient is a 68 y.o. female with recent COVID infection, Pneumonia, UTI who presents with complaint of confusion followed by some slurred speech.     Last know well: 2/3/2023 @ 7AM    On presentation:  BP: 149/64  BSL: 90    Prior to arrival patient was on  Antiplatelets/anticoagulants: no  Statins: simvastatin 40    Of note, patient has generalized weakness and malaise and also have fever of 102 F (38.9 C)     PAST MEDICAL HISTORY :       Past Medical History:        Diagnosis Date    [facility]     Acquired absence of kidney     Adult hypertrophic pyloric stenosis     Agoraphobia with panic disorder     Agoraphobia without history of panic disorder     Allergic rhinitis     Anemia, unspecified     Anxiety     Anxiety disorder     Arthritis     Atrial fibrillation (HCC)     Back pain     Bronchitis     Caffeine use     8 coffee / day    Cardiomegaly     Carpal tunnel syndrome     Cataracts, bilateral     Centriacinar emphysema (HCC)     Chronic kidney disease, stage III (moderate) (HCC)     Chronic pain     Constipation     Constipation     COPD (chronic obstructive pulmonary disease) (HCC)     Emphysema    Cystitis with hematuria     Depression     Diaphragmatic hernia without obstruction or gangrene     Diarrhea     Difficulty walking     Dry eye syndrome of unspecified lacrimal gland     Esophageal obstruction     FOM (frequency of micturition)     Foot drop, right foot     Gastro-esophageal reflux disease with esophagitis, with bleeding     GERD (gastroesophageal reflux disease)     H/O gastric ulcer     HTN (hypertension)     Hyperlipidemia     Hypertension, essential     Klebsiella pneumoniae (k. pneumoniae) as the cause of diseases classified elsewhere     Major depression, recurrent (HCC)     Mitral valve prolapse     Mixed hyperlipidemia     Mumps     Muscle weakness (generalized)     MVP (mitral valve prolapse)     Dr. Ambar Zhao in May 2019    Old myocardial infarction     Personal history of disease of digestive system     Psychophysiological insomnia     Recurrent UTI     Dr. Young Labor    S/P PICC central line placement 08/05/2022    \"no longer present\" per Sin Plane (Nurse) at [facility] of [place]. Sepsis ([address]     Sinusitis     Tracheostomy in place [facility])     \"No longer present\" per Sin Plane (Nurse) at [facility] of [place].     Ulcerative esophagitis     Urinary tract infection, site not specified     Uses wheelchair     Wears glasses     Wellness examination     Dr. Shannon Sainz seen in Jan 2020       Past Surgical History:        Procedure Laterality Date    APPENDECTOMY      [address], LAPAROSCOPIC N/A 05/22/2020    XI LAPAROSCOPIC ROBOTIC CHOLECYSTECTOMY, PYLOROPLASTY performed by Thong Frias MD at [address] [place] [address]      COLONOSCOPY N/A 1/28/2022    COLONOSCOPY POLYPECTOMY HOT performed by Sheela Michelle MD at [place] [address]      ERCP  05/21/2020    with stent insertion, balloon dilation, sphinctereotomy    ERCP  05/21/2020    ** CASE IN OR WITY GI STAFF** ERCP STENT INSERTION performed by Sheela Michelle MD at [facility]    ERCP  05/21/2020    ** CASE IN OR WITH GI STAFF**ERCP SPHINCTER/PAPILLOTOMY performed by Sheela Michelle MD at [facility]    ERCP  05/21/2020    ** CASE IN OR WITH GI STAFF**ERCP DILATION BALLOON performed by Sheela Michelle MD at [facility]    ERCP N/A 2/8/2021    ERCP STENT REMOVAL performed by Celsa Solomon MD at [facility]    ERCP  2/8/2021    ERCP STONE REMOVAL performed by Robles Aponte MD at [address] [facility]    GASTRIC FUNDOPLICATION N/A 21/94/5626    XI LAPAROSCOPIC ROBOTIC HIATAL HERNIA REPAIR, CHERYL FUNDOPLICATION performed by Pankaj Martin MD at [address] [facility] [address] 03/27/2020    EGD PEG TUBE PLACEMENT performed by Pankaj Martin MD at [address] [facility] N/A 2/8/2021    **CASE IN O.R. W/ GI STAFF - EGD WITH REMOVAL PEG TUBE performed by Robles Aponte MD at [facility]    HAND SURGERY      [facility]. CATH POWER PICC TRIPLE  03/04/2020         HERNIA REPAIR      Hiatal hernia    HYSTERECTOMY (CERVIX STATUS UNKNOWN)      Abdominal    IR NONTUNNELED VASCULAR CATHETER  5/10/2022    IR NONTUNNELED VASCULAR CATHETER 5/10/2022 Vani Mclain MD [facility] SPECIAL PROCEDURES    JOINT REPLACEMENT Right     right hip    OVARY REMOVAL      RHINOPLASTY      TONSILLECTOMY      TOTAL HIP ARTHROPLASTY      TOTAL NEPHRECTOMY      atrophic from infections, age 13    TRACHEOSTOMY N/A 03/27/2020    **ADD ON **WANTS 10:00AM **TRACHEOTOMY performed by Pankaj Martin MD at [address] [facility] N/A 04/02/2020    TRACHEOTOMY EXCHANGE performed by Pankaj Martin MD at [address] [place] [address] 03/17/2020    BEDSIDE EGD ESOPHAGOGASTRODUODENOSCOPY (ICU) performed by Pankaj Martin MD at [address] [facility] 05/18/2020    EGD BIOPSY performed by Palmira Black MD at [address] [facility]  05/18/2020    EGD DILATION BALLOON performed by Palmira Black MD at [address] [facility] N/A 07/06/2020    ** CASE IN O.R. WITH GI STAFF** EGD ESOPHAGOGASTRODUODENOSCOPY performed by Mayte Miles MD at [address] [facility] 11/09/2020    EGD DIAGNOSTIC ONLY performed by Mary Bryson MD at [facility] UPPER GASTROINTESTINAL ENDOSCOPY  02/08/2021    - EGD WITH REMOVAL PEG TUBE,ERCP STENT REMOVAL,ERCP STONE REMOVAL    UPPER GASTROINTESTINAL ENDOSCOPY N/A 11/24/2021    EGD BIOPSY performed by Martita Argueta MD at [address] 9/8/2022    EGD BIOPSY performed by Xenia Marks MD at [address] [facility] N/A 10/11/2022    EGD BIOPSY performed by Xenia Marks MD at [address] [place] [address] History:   Social History     Socioeconomic History    Marital status:      Spouse name: Not on file    Number of children: 2    Years of education: Not on file    Highest education level: Not on file   Occupational History    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   Tobacco Use    Smoking status: Former     Packs/day: 1.00     Years: 50.00     Pack years: 50.00     Types: Cigarettes    Smokeless tobacco: Never    Tobacco comments:     quit 1 year ago    Vaping Use    Vaping Use: Former    Substances: Always   Substance and Sexual Activity    Alcohol use: No    Drug use: No    Sexual activity: Never   Other Topics Concern    Not on file   Social History Narrative    Not on file     Social Determinants of Health     Financial Resource Strain: Not on file   Food Insecurity: Not on file   Transportation Needs: Not on file   Physical Activity: Not on file   Stress: Not on file   Social Connections: Not on file   Intimate Partner Violence: Not on file   Housing Stability: Not on file       Family History:       Problem Relation Age of Onset    Cancer Mother         uterine    Heart Attack Mother     Heart Attack Father     Other Maternal Grandfather         foot gangrene    Other Paternal Grandmother         bleeding ulcers    Other Paternal Grandfather         lung cancer       Allergies:  Prednisone, Compazine [prochlorperazine maleate], Penicillin v potassium, and Penicillins    Home Medications:  Prior to Admission medications    Medication Sig Start Date End Date Taking? Authorizing Provider   ondansetron (ZOFRAN-ODT) 4 MG disintegrating tablet Take 1 tablet by mouth every morning (before breakfast) 2/3/23   Antoine Houser MD   pantoprazole (PROTONIX) 40 MG tablet Take 1 tablet by mouth 2 times daily 2/3/23   Andrew Post MD   oxyCODONE-acetaminophen (PERCOCET) 5-325 MG per tablet  1/29/23   Historical Provider, MD   amitriptyline (ELAVIL) 25 MG tablet Take 1 tablet by mouth in the morning, at noon, and at bedtime 12/27/22   Historical Provider, MD   fesoterodine (TOVIAZ) 4 MG TB24 ER tablet Take 1 tablet by mouth 12/5/22   Historical Provider, MD   gabapentin (NEURONTIN) 300 MG capsule Take 1 capsule by mouth in the morning, at noon, and at bedtime. 12/30/22   Historical Provider, MD   escitalopram (LEXAPRO) 20 MG tablet Take 1 tablet by mouth daily 12/5/22   Historical Provider, MD   QUEtiapine (SEROQUEL) 50 MG tablet Take 1 tablet by mouth daily In the morning 12/19/22   Historical Provider, MD   Sodium Phosphates (FLEET) 7-19 GM/118ML Place 1 enema rectally once as needed    Historical Provider, MD   aluminum & magnesium hydroxide-simethicone (MAALOX) 200-200-20 MG/5ML SUSP suspension Take 5 mLs by mouth as needed for Indigestion    Historical Provider, MD   traZODone (DESYREL) 100 MG tablet Take 100 mg by mouth nightly    Historical Provider, MD   ondansetron (ZOFRAN) 4 MG tablet Take 4 mg by mouth every 8 hours as needed for Nausea or Vomiting    Historical Provider, MD   albuterol (PROVENTIL) (2.5 MG/3ML) 0.083% nebulizer solution Take 2.5 mg by nebulization every 6 hours as needed for Wheezing    Historical Provider, MD   carboxymethylcellulose 1 % ophthalmic solution Place 1 drop into both eyes in the morning and 1 drop at noon and 1 drop in the evening and 1 drop before bedtime.     Historical Provider, MD   magnesium hydroxide (MILK OF MAGNESIA) 400 MG/5ML suspension Take 30 mLs by mouth Give 30ml by mouth as needed for constipation no BM x3 days    Historical Provider, MD acetaminophen (TYLENOL) 500 MG tablet Take 1,000 mg by mouth in the morning and at bedtime    Historical Provider, MD   Cholecalciferol (VITAMIN D) 50 MCG (2000 UT) CAPS capsule Take 1 capsule by mouth daily 5/10/22   Messi Goetz DO   guaiFENesin (MUCINEX) 600 MG extended release tablet Take 400 mg by mouth in the morning. Indications: Cough. As needed for mucus. Historical Provider, MD   Lactobacillus TABS Take 1 tablet by mouth 2 times daily Take One tablet by mouth two times daily    Historical Provider, MD   fluticasone (FLONASE) 50 MCG/ACT nasal spray 1 spray by Each Nostril route daily    Historical Provider, MD   loratadine (CLARITIN) 10 MG capsule Take 10 mg by mouth daily    Historical Provider, MD   clonazePAM (KLONOPIN) 0.5 MG tablet Take 0.5 mg by mouth 3 times daily as needed (tid). Historical Provider, MD   metoclopramide (REGLAN) 5 MG tablet Take 1 tablet by mouth 4 times daily 1/4/22   Antoine Houser MD   polyethylene glycol (GLYCOLAX) 17 g packet Take 17 g by mouth daily as needed for Constipation 12/19/21   Celso Garvin MD   QUEtiapine (SEROQUEL) 100 MG tablet Take 1 tablet by mouth nightly for 3 days 12/19/21 1/2/23  Celso Garvin MD   Folic Acid-Cholecalciferol 1-2000 MG-UNIT TABS Take by mouth    Historical Provider, MD   busPIRone (BUSPAR) 10 MG tablet Take 1 tablet by mouth 2 times daily 11/30/21   Kerri Isaacs MD   bisacodyl (DULCOLAX) 10 MG suppository Place 10 mg rectally daily as needed for Constipation PRN for constipation no BM x4 days.     Historical Provider, MD   ferrous sulfate (FE TABS 325) 325 (65 Fe) MG EC tablet Take 1 tablet by mouth daily (with breakfast) 12/16/20   MARGOTH Gay - SREE   metoprolol succinate (TOPROL XL) 25 MG extended release tablet Take 1 tablet by mouth daily 5/30/20   Edson Talbert MD   sucralfate (CARAFATE) 1 GM tablet Take 1 tablet by mouth 4 times daily 4/6/20   Kevin Hull MD   Oyster Shell 500 MG TABS Take 500 mg by mouth 2 times daily     Historical Provider, MD   simvastatin (ZOCOR) 40 MG tablet Take 40 mg by mouth nightly. Historical Provider, MD       Current Medications:   No current facility-administered medications for this encounter. REVIEW OF SYSTEMS:       CONSTITUTIONAL: positive for fatigue and malaise   EYES: negative for double vision and photophobia    HEENT: negative for tinnitus and sore throat   RESPIRATORY: negative for cough, shortness of breath   CARDIOVASCULAR: negative for chest pain, palpitations   GASTROINTESTINAL: negative for nausea, vomiting   GENITOURINARY: negative for incontinence   MUSCULOSKELETAL: negative for neck or back pain   NEUROLOGICAL: negative for seizures   PSYCHIATRIC: positive for fatigue     Review of systems otherwise negative. PHYSICAL EXAM:       BP (!) 149/64   Pulse 87   Temp (!) 102 °F (38.9 °C) (Oral)   Resp 25   SpO2 90%     CONSTITUTIONAL:  Well developed, well nourished, alert and oriented x 2, in no acute distress. nontoxic. + dysarthria, no aphasia.    HEAD:  normocephalic, atraumatic    EYES:  PERRLA, EOMI.   ENT:  moist mucous membranes   NECK:  supple, symmetric, no midline tenderness to palpation    BACK:  without midline tenderness, step-offs or deformities    LUNGS:  Equal air entry bilaterally   CARDIOVASCULAR:  normal s1 / s2   ABDOMEN:  Soft, no rigidity   NEUROLOGIC:  Mental Status:  A & O x2,awake             Cranial Nerves:    cranial nerves II-XII are grossly intact    Motor Exam:    Drift:  present - B/L LE  Tone:  normal    Antigravity in all 4 extremities    Sensory:    Touch:    Right Upper Extremity:  normal  Left Upper Extremity:  normal  Right Lower Extremity:  normal  Left Lower Extremity:  normal    Deep Tendon Reflexes:    Right Bicep:  2+  Left Bicep:  2+  Right Knee:  2+  Left Knee:  2+    Plantar Response:  Right:  downgoing  Left:  downgoing    Clonus:  N/A  Cabrera's:  N/A    Coordination/Dysmetria:  Finger to Nose:   Right: normal  Left:  normal   Dysdiadochokinesia:  N/A    Gait:  Not tested due to condition    INITIAL NIH STROKE SCALE:    Time Performed:  6501 PM     1a. Level of consciousness:  0 - alert; keenly responsive  1b. Level of consciousness questions:  0 - answers both questions correctly  1c. Level of consciousness questions:  0 - performs both tasks correctly  2. Best Gaze:  0 - normal  3. Visual:  0 - no visual loss  4. Facial Palsy:  0 - normal symmetric movement  5a. Motor left arm:  0 - no drift, limb holds 90 (or 45) degrees for full 10 seconds  5b. Motor right arm:  0 - no drift, limb holds 90 (or 45) degrees for full 10 seconds  6a. Motor left le - drift; leg falls by the end of the 5 second period but does not hit bed  6b. Motor right le - drift; leg falls by the end of the 5 second period but does not hit bed  7. Limb Ataxia:  0 - absent  8. Sensory:  0 - normal; no sensory loss  9. Best Language:  0 - no aphasia, normal  10. Dysarthria:  1 - mild to moderate, patient slurs at least some words and at worst, can be understood with some difficulty  11.   Extinction and Inattention:  0 - no abnormality    TOTAL:  3     SKIN:  no rash      Modified [place] Score Scale:     [] Zero: No symptoms at all   [] 1: No significant disability despite symptoms; able to carry out all usual duties and activities   [x] 2: Slight disability; unable to carry out all previous activities, but able to look after own affairs without assistance   [] 3:Moderate disability; requiring some help, but able to walk without assistance   [] 4: Moderately severe disability; unable to walk and attend to bodily needs without assistance   [] 5:Severe disability; bedridden, incontinent and requiring constant nursing care and attention    LABS AND IMAGING:     CBC with Differential:    Lab Results   Component Value Date/Time    WBC 5.3 2023 04:26 AM    RBC 3.96 2023 04:26 AM    HGB 11.9 2023 04:26 AM    HCT 36.8 01/06/2023 04:26 AM    PLT See Reflexed IPF Result 01/06/2023 04:26 AM    MCV 92.9 01/06/2023 04:26 AM    MCH 30.1 01/06/2023 04:26 AM    MCHC 32.3 01/06/2023 04:26 AM    RDW 13.9 01/06/2023 04:26 AM    NRBC 1 06/02/2020 06:05 AM    LYMPHOPCT 11 01/02/2023 05:59 AM    MONOPCT 3 01/02/2023 05:59 AM    BASOPCT 0 01/02/2023 05:59 AM    MONOSABS 0.20 01/02/2023 05:59 AM    LYMPHSABS 0.72 01/02/2023 05:59 AM    EOSABS 0.00 01/02/2023 05:59 AM    BASOSABS 0.00 01/02/2023 05:59 AM    DIFFTYPE NOT REPORTED 12/17/2021 10:05 PM         BMP:    Lab Results   Component Value Date/Time     01/06/2023 04:26 AM    K 3.6 01/06/2023 04:26 AM     01/06/2023 04:26 AM    CO2 26 01/06/2023 04:26 AM    BUN 14 01/06/2023 04:26 AM    LABALBU 3.1 01/04/2023 10:21 AM    CREATININE 0.84 01/06/2023 04:26 AM    CALCIUM 8.6 01/06/2023 04:26 AM    GFRAA >60 08/17/2022 06:17 PM    LABGLOM >60 01/06/2023 04:26 AM    GLUCOSE 98 01/06/2023 04:26 AM       Radiology Review:    1.) CT brain without contrast:  Impression   No acute intracranial abnormality. Mild parenchymal volume loss. Mild chronic microvascular disease. Results were reported to Dr. Dorothy Coffman by radiology results communication at   1:08 p.m. on February 3, 2023.     2.) CTA Head/Neck:   Impression   50% stenosis in the proximal left internal carotid artery. Mild stenosis in the proximal right internal carotid artery. Otherwise, no acute abnormality or flow-limiting stenosis in the remainder of   the major arteries of the head and neck.      3.) Brain MRI W/O:    ASSESSMENT AND PLAN:       Patient Active Problem List   Diagnosis    Frequency of urination    Backache    GERD (gastroesophageal reflux disease)    MVP (mitral valve prolapse)    Depression    Anxiety    BENEDICT (acute kidney injury) ([facility]    Dysphagia    Hiatal hernia    History of repair of hiatal hernia    Centrilobular emphysema (HCC)    Esophageal dilatation    Shock ([facility]    Fever    Critical illness polyneuropathy ([facility]    Gastric outlet obstruction    Recurrent UTI    Tracheostomy in place [facility])    H/O gastric ulcer    Hyperlipidemia    Essential hypertension    Tobacco abuse    Chronic respiratory failure with hypoxia (HCC)    S/P percutaneous endoscopic gastrostomy (PEG) tube placement (HCC)    S/P Nissen fundoplication (without gastrostomy tube) procedure    Anemia due to blood loss    Phlebitis after infusion    Esophagitis determined by endoscopy    Expressive aphasia    Transient speech disturbance    Speech disturbance    Coffee ground emesis    Acute cystitis without hematuria    Ulcerative esophagitis    Septicemia (HCC)    Lower abdominal pain    Ileus (HCC)    Chronic pain    Dyspnea on exertion    Orthostatic hypotension    Stage 3 chronic kidney disease (HCC)    Difficulty walking    Class 1 obesity in adult    Generalized abdominal pain    Multifocal pneumonia    Other constipation    Intractable abdominal pain    ESBL (extended spectrum beta-lactamase) producing bacteria infection    Complicated UTI (urinary tract infection)    COVID-19    Acute on chronic respiratory failure with hypoxia (HCC)    COVID    Hypoxia    Elevated C-reactive protein (CRP)    Chronic obstructive pulmonary disease (HCC)    Acute respiratory failure with hypoxia ([facility]       Assessment                 68 y.o. female with recent COVID infection, Pneumonia, UTI who presents with complaint of confusion followed by some slurred speech. Febrile  Symptoms likely secondary to underlying infection or metabolic etiology    Last Known Well (date and time): 2/3/23 @ 7AM    2. Candidate for IV Tenecteplase therapy     Yes []     No  [x] due to the following exclusion criteria: out of [place]    3.  Candidate for Thrombectomy    Yes []      No [x] due to the following exclusion criteria: no LVO    - Discussed with Dr. Luana Mcclure     Recommendations:    [] General Neurology Care Status - prefer [facility] floor (5A/5C)   [x] Internal Medicine General Care Status   [] NICU Status - (5B)     [] MICU Status   [] Observation Status    Medical management per primary team.  If symptoms persist despite treatment of infection, obtain MRI Brain w/o contrast and consider neuro consultation. Please contact [facility] with any changes in patients neurologic status. Thank you for your consult.        Vivi Bryant MD   PGY 4 Neurology Resident  2/3/2023 at 12:47 PM

## 2023-02-03 NOTE — ED NOTES
Labeled COVID swab sent to lab via tube system. [x] COVID-19 swab      [x] Rapid   [] Non- Rapid/PCR  [] Respiratory Panel with COVID    Labeled urine specimen sent to lab via tube system.     [x] Urine Sample   []  Clean catch   [x] Straight cath   [] Urine voided   []  Indwelling catheter   []  Suprapubic catheter     Kaylee Drummond RN  02/03/23 [id number]

## 2023-02-03 NOTE — ED NOTES
Pt respirations are even and unlabored, pt is alert and oriented X 4, speaking in complete sentences, bed is in the lowest position, call light is within reach, NAD noted. Will continue to follow plan of care.         Delaney Keith RN  02/03/23 [id number]

## 2023-02-03 NOTE — ED NOTES
Pt respirations are even and unlabored, pt is alert and oriented X 3, bed is in the lowest position, call light is within reach, NAD noted. Will continue to follow plan of care.         Myrna Adams RN  02/03/23 [id number]

## 2023-02-03 NOTE — ED NOTES
Pt respirations are even and unlabored, pt is alert and oriented X 4, speaking in complete sentences, bed is in the lowest position, call light is within reach, NAD noted. Will continue to follow plan of care.         Olimpia Bobby RN  02/03/23 [id number]

## 2023-02-04 PROBLEM — Q60.0 CONGENITAL SOLITARY KIDNEY: Status: ACTIVE | Noted: 2023-02-04

## 2023-02-04 PROBLEM — Z79.899 POLYPHARMACY: Status: ACTIVE | Noted: 2023-02-04

## 2023-02-04 PROBLEM — R41.82 ALTERED MENTAL STATUS: Status: ACTIVE | Noted: 2023-02-04

## 2023-02-04 PROBLEM — Z86.19 HISTORY OF ESBL E. COLI INFECTION: Status: ACTIVE | Noted: 2023-02-04

## 2023-02-04 PROBLEM — G93.41 ACUTE METABOLIC ENCEPHALOPATHY: Status: ACTIVE | Noted: 2023-02-04

## 2023-02-04 LAB
ABSOLUTE EOS #: 0.08 K/UL (ref 0–0.44)
ABSOLUTE IMMATURE GRANULOCYTE: 0.09 K/UL (ref 0–0.3)
ABSOLUTE LYMPH #: 0.97 K/UL (ref 1.1–3.7)
ABSOLUTE MONO #: 0.76 K/UL (ref 0.1–1.2)
ANION GAP SERPL CALCULATED.3IONS-SCNC: 12 MMOL/L (ref 9–17)
BASOPHILS # BLD: 0 % (ref 0–2)
BASOPHILS ABSOLUTE: <0.03 K/UL (ref 0–0.2)
BUN SERPL-MCNC: 13 MG/DL (ref 8–23)
CALCIUM SERPL-MCNC: 8.5 MG/DL (ref 8.6–10.4)
CHLORIDE SERPL-SCNC: 102 MMOL/L (ref 98–107)
CO2 SERPL-SCNC: 22 MMOL/L (ref 20–31)
CREAT SERPL-MCNC: 1.08 MG/DL (ref 0.5–0.9)
EOSINOPHILS RELATIVE PERCENT: 2 % (ref 1–4)
GFR SERPL CREATININE-BSD FRML MDRD: 53 ML/MIN/1.73M2
GLUCOSE BLD-MCNC: 106 MG/DL (ref 65–105)
GLUCOSE SERPL-MCNC: 94 MG/DL (ref 70–99)
HCT VFR BLD AUTO: 38.5 % (ref 36.3–47.1)
HGB BLD-MCNC: 12.6 G/DL (ref 11.9–15.1)
IMMATURE GRANULOCYTES: 2 %
LYMPHOCYTES # BLD: 19 % (ref 24–43)
MCH RBC QN AUTO: 30.2 PG (ref 25.2–33.5)
MCHC RBC AUTO-ENTMCNC: 32.7 G/DL (ref 28.4–34.8)
MCV RBC AUTO: 92.3 FL (ref 82.6–102.9)
MONOCYTES # BLD: 15 % (ref 3–12)
NRBC AUTOMATED: 0 PER 100 WBC
PDW BLD-RTO: 14.7 % (ref 11.8–14.4)
PLATELET # BLD AUTO: ABNORMAL K/UL (ref 138–453)
PLATELET, FLUORESCENCE: 130 K/UL (ref 138–453)
PLATELET, IMMATURE FRACTION: 3.5 % (ref 1.1–10.3)
POTASSIUM SERPL-SCNC: 3.8 MMOL/L (ref 3.7–5.3)
RBC # BLD: 4.17 M/UL (ref 3.95–5.11)
RBC # BLD: ABNORMAL 10*6/UL
SEG NEUTROPHILS: 62 % (ref 36–65)
SEGMENTED NEUTROPHILS ABSOLUTE COUNT: 3.18 K/UL (ref 1.5–8.1)
SODIUM SERPL-SCNC: 136 MMOL/L (ref 135–144)
WBC # BLD AUTO: 5.1 K/UL (ref 3.5–11.3)

## 2023-02-04 PROCEDURE — 51798 US URINE CAPACITY MEASURE: CPT

## 2023-02-04 PROCEDURE — 6370000000 HC RX 637 (ALT 250 FOR IP): Performed by: STUDENT IN AN ORGANIZED HEALTH CARE EDUCATION/TRAINING PROGRAM

## 2023-02-04 PROCEDURE — 82947 ASSAY GLUCOSE BLOOD QUANT: CPT

## 2023-02-04 PROCEDURE — 6360000002 HC RX W HCPCS

## 2023-02-04 PROCEDURE — 6360000002 HC RX W HCPCS: Performed by: INTERNAL MEDICINE

## 2023-02-04 PROCEDURE — 51701 INSERT BLADDER CATHETER: CPT

## 2023-02-04 PROCEDURE — 2580000003 HC RX 258: Performed by: INTERNAL MEDICINE

## 2023-02-04 PROCEDURE — 2700000000 HC OXYGEN THERAPY PER DAY

## 2023-02-04 PROCEDURE — 99223 1ST HOSP IP/OBS HIGH 75: CPT | Performed by: INTERNAL MEDICINE

## 2023-02-04 PROCEDURE — 94761 N-INVAS EAR/PLS OXIMETRY MLT: CPT

## 2023-02-04 PROCEDURE — 85055 RETICULATED PLATELET ASSAY: CPT

## 2023-02-04 PROCEDURE — 6370000000 HC RX 637 (ALT 250 FOR IP): Performed by: INTERNAL MEDICINE

## 2023-02-04 PROCEDURE — 2580000003 HC RX 258

## 2023-02-04 PROCEDURE — 6370000000 HC RX 637 (ALT 250 FOR IP)

## 2023-02-04 PROCEDURE — 99221 1ST HOSP IP/OBS SF/LOW 40: CPT | Performed by: INTERNAL MEDICINE

## 2023-02-04 PROCEDURE — 36415 COLL VENOUS BLD VENIPUNCTURE: CPT

## 2023-02-04 PROCEDURE — 85025 COMPLETE CBC W/AUTO DIFF WBC: CPT

## 2023-02-04 PROCEDURE — 80048 BASIC METABOLIC PNL TOTAL CA: CPT

## 2023-02-04 PROCEDURE — 2060000000 HC ICU INTERMEDIATE R&B

## 2023-02-04 RX ORDER — 0.9 % SODIUM CHLORIDE 0.9 %
500 INTRAVENOUS SOLUTION INTRAVENOUS ONCE
Status: COMPLETED | OUTPATIENT
Start: 2023-02-04 | End: 2023-02-04

## 2023-02-04 RX ORDER — CLONAZEPAM 0.5 MG/1
0.5 TABLET ORAL 3 TIMES DAILY PRN
Status: DISCONTINUED | OUTPATIENT
Start: 2023-02-04 | End: 2023-02-08 | Stop reason: HOSPADM

## 2023-02-04 RX ORDER — GUAIFENESIN DEXTROMETHORPHAN HYDROBROMIDE ORAL SOLUTION 10; 100 MG/5ML; MG/5ML
10 SOLUTION ORAL EVERY 4 HOURS PRN
Status: DISCONTINUED | OUTPATIENT
Start: 2023-02-04 | End: 2023-02-05

## 2023-02-04 RX ADMIN — GABAPENTIN 300 MG: 300 CAPSULE ORAL at 09:15

## 2023-02-04 RX ADMIN — HEPARIN SODIUM 5000 UNITS: 5000 INJECTION INTRAVENOUS; SUBCUTANEOUS at 06:05

## 2023-02-04 RX ADMIN — MEROPENEM 1000 MG: 1 INJECTION, POWDER, FOR SOLUTION INTRAVENOUS at 12:31

## 2023-02-04 RX ADMIN — GABAPENTIN 300 MG: 300 CAPSULE ORAL at 16:42

## 2023-02-04 RX ADMIN — HEPARIN SODIUM 5000 UNITS: 5000 INJECTION INTRAVENOUS; SUBCUTANEOUS at 14:13

## 2023-02-04 RX ADMIN — OXYCODONE HYDROCHLORIDE AND ACETAMINOPHEN 1 TABLET: 5; 325 TABLET ORAL at 06:12

## 2023-02-04 RX ADMIN — ACETAMINOPHEN 650 MG: 325 TABLET ORAL at 12:28

## 2023-02-04 RX ADMIN — ONDANSETRON 4 MG: 2 INJECTION INTRAMUSCULAR; INTRAVENOUS at 06:23

## 2023-02-04 RX ADMIN — Medication 1 CAPSULE: at 09:14

## 2023-02-04 RX ADMIN — SODIUM CHLORIDE 500 ML: 9 INJECTION, SOLUTION INTRAVENOUS at 03:30

## 2023-02-04 RX ADMIN — CLONAZEPAM 0.5 MG: 0.5 TABLET ORAL at 11:37

## 2023-02-04 RX ADMIN — FERROUS SULFATE TAB EC 325 MG (65 MG FE EQUIVALENT) 325 MG: 325 (65 FE) TABLET DELAYED RESPONSE at 09:15

## 2023-02-04 RX ADMIN — Medication 600 MG: at 20:02

## 2023-02-04 RX ADMIN — CARBOXYMETHYLCELLULOSE SODIUM 1 DROP: 10 GEL OPHTHALMIC at 04:11

## 2023-02-04 RX ADMIN — GABAPENTIN 300 MG: 300 CAPSULE ORAL at 20:30

## 2023-02-04 RX ADMIN — CARBOXYMETHYLCELLULOSE SODIUM 1 DROP: 10 GEL OPHTHALMIC at 21:50

## 2023-02-04 RX ADMIN — ESCITALOPRAM OXALATE 20 MG: 10 TABLET ORAL at 09:14

## 2023-02-04 RX ADMIN — DEXTROMETHORPHAN HYDROBROMIDE, GUAIFENESIN 10 ML: 10; 100 LIQUID ORAL at 14:11

## 2023-02-04 RX ADMIN — DESMOPRESSIN ACETATE 40 MG: 0.2 TABLET ORAL at 09:15

## 2023-02-04 RX ADMIN — CARBOXYMETHYLCELLULOSE SODIUM 1 DROP: 10 GEL OPHTHALMIC at 16:43

## 2023-02-04 RX ADMIN — CARBOXYMETHYLCELLULOSE SODIUM 1 DROP: 10 GEL OPHTHALMIC at 09:15

## 2023-02-04 RX ADMIN — BUSPIRONE HYDROCHLORIDE 10 MG: 10 TABLET ORAL at 20:01

## 2023-02-04 RX ADMIN — ONDANSETRON 4 MG: 4 TABLET, ORALLY DISINTEGRATING ORAL at 12:21

## 2023-02-04 RX ADMIN — SODIUM CHLORIDE, PRESERVATIVE FREE 10 ML: 5 INJECTION INTRAVENOUS at 20:05

## 2023-02-04 RX ADMIN — METOPROLOL SUCCINATE 25 MG: 25 TABLET, FILM COATED, EXTENDED RELEASE ORAL at 09:14

## 2023-02-04 RX ADMIN — OXYCODONE HYDROCHLORIDE AND ACETAMINOPHEN 1 TABLET: 5; 325 TABLET ORAL at 18:13

## 2023-02-04 RX ADMIN — Medication 2000 UNITS: at 09:14

## 2023-02-04 RX ADMIN — TROSPIUM CHLORIDE 20 MG: 20 TABLET, FILM COATED ORAL at 16:43

## 2023-02-04 RX ADMIN — Medication 600 MG: at 09:14

## 2023-02-04 RX ADMIN — MAGNESIUM HYDROXIDE 30 ML: 400 SUSPENSION ORAL at 20:00

## 2023-02-04 RX ADMIN — PANTOPRAZOLE SODIUM 40 MG: 40 TABLET, DELAYED RELEASE ORAL at 20:01

## 2023-02-04 RX ADMIN — TROSPIUM CHLORIDE 20 MG: 20 TABLET, FILM COATED ORAL at 06:04

## 2023-02-04 RX ADMIN — OXYCODONE HYDROCHLORIDE AND ACETAMINOPHEN 1 TABLET: 5; 325 TABLET ORAL at 11:40

## 2023-02-04 RX ADMIN — SODIUM CHLORIDE: 9 INJECTION, SOLUTION INTRAVENOUS at 00:15

## 2023-02-04 RX ADMIN — GUAIFENESIN 600 MG: 600 TABLET, EXTENDED RELEASE ORAL at 09:16

## 2023-02-04 RX ADMIN — PANTOPRAZOLE SODIUM 40 MG: 40 TABLET, DELAYED RELEASE ORAL at 09:15

## 2023-02-04 RX ADMIN — MEROPENEM 1000 MG: 1 INJECTION, POWDER, FOR SOLUTION INTRAVENOUS at 20:04

## 2023-02-04 RX ADMIN — Medication 1 CAPSULE: at 20:05

## 2023-02-04 RX ADMIN — CLONAZEPAM 0.5 MG: 0.5 TABLET ORAL at 21:53

## 2023-02-04 RX ADMIN — MEROPENEM 1000 MG: 1 INJECTION, POWDER, FOR SOLUTION INTRAVENOUS at 04:10

## 2023-02-04 RX ADMIN — TRAZODONE HYDROCHLORIDE 100 MG: 100 TABLET ORAL at 20:02

## 2023-02-04 RX ADMIN — HEPARIN SODIUM 5000 UNITS: 5000 INJECTION INTRAVENOUS; SUBCUTANEOUS at 21:50

## 2023-02-04 ASSESSMENT — PAIN DESCRIPTION - DESCRIPTORS
DESCRIPTORS: ACHING
DESCRIPTORS: HEAVINESS

## 2023-02-04 ASSESSMENT — PAIN DESCRIPTION - LOCATION
LOCATION: ABDOMEN
LOCATION: ABDOMEN

## 2023-02-04 ASSESSMENT — PAIN - FUNCTIONAL ASSESSMENT
PAIN_FUNCTIONAL_ASSESSMENT: PREVENTS OR INTERFERES SOME ACTIVE ACTIVITIES AND ADLS
PAIN_FUNCTIONAL_ASSESSMENT: PREVENTS OR INTERFERES SOME ACTIVE ACTIVITIES AND ADLS

## 2023-02-04 ASSESSMENT — PAIN SCALES - GENERAL
PAINLEVEL_OUTOF10: 3
PAINLEVEL_OUTOF10: 7
PAINLEVEL_OUTOF10: 3
PAINLEVEL_OUTOF10: 4

## 2023-02-04 NOTE — CARE COORDINATION
02/04/23 1240   Readmission Assessment   Number of Days since last admission? 8-30 days   Previous Disposition SNF   Who is being Interviewed Patient   What was the patient's/caregiver's perception as to why they think they needed to return back to the hospital? Other (Comment)  (Readmitted with a different diagnosis)   Did you visit your Primary Care Physician after you left the hospital, before you returned this time? No   Why weren't you able to visit your PCP? Did not have an appointment   Did you see a specialist, such as Cardiac, Pulmonary, Orthopedic Physician, etc. after you left the hospital? No   Who advised the patient to return to the hospital? Skilled Unit   Does the patient report anything that got in the way of taking their medications?  No

## 2023-02-04 NOTE — PROGRESS NOTES
Comprehensive Nutrition Assessment    Type and Reason for Visit:  Positive Nutrition Screen    Nutrition Recommendations/Plan:   Start Standard High Calorie/High Protein oral nutrition supplement Qd to aid in meeting nutrition needs related current intakes fluctuating. Continue to monitor weights, intakes, labs, cognition and follow up. Malnutrition Assessment:  Malnutrition Status:  Mild malnutrition (02/04/23 1536)    Context:  Acute Illness     Findings of the 6 clinical characteristics of malnutrition:  Energy Intake:  Mild decrease in energy intake (Comment)  Weight Loss:  Unable to assess     Body Fat Loss:  Mild body fat loss Orbital   Muscle Mass Loss:  Mild muscle mass loss Temples (temporalis)  Fluid Accumulation:  No significant fluid accumulation     Strength:  Not Performed    Nutrition Assessment:    Chart reviewed for a positive nutrition screen related to weight loss or decreased appetite prior to admission. Patient admitted related to AMS, pneumonia and colitis. Patient asleep at time of visit. Per RN, patient consumed 75% of breakfast, but lunch intake was less than adequate. RN stated that she feels patient would benefit from an ONS. EMR weight history does not indicate any weight changes. Nutrition Related Findings:    Labs/Meds reviewed Wound Type: None       Current Nutrition Intake & Therapies:    Average Meal Intake: 26-50%, 51-75%  Average Supplements Intake: None Ordered  ADULT DIET; Regular    Anthropometric Measures:  Height: 5' 2\" (157.5 cm)  Ideal Body Weight (IBW): 110 lbs (50 kg)    Admission Body Weight: 181 lb (82.1 kg)  Current Body Weight: 181 lb (82.1 kg),   IBW. Weight Source:  (Estimated)  Current BMI (kg/m2): 33.1  Weight Adjustment For: No Adjustment  BMI Categories: Obese Class 1 (BMI 30.0-34. 9)    Estimated Daily Nutrient Needs:  Energy Requirements Based On: Formula  Weight Used for Energy Requirements: Current  Energy (kcal/day): 1300-1500kcal/day  Weight Used for Protein Requirements: Ideal  Protein (g/day): 60-75gmsPRO/day  Fluid (ml/day): Per MD    Nutrition Diagnosis:   Inadequate oral intake related to cognitive or neurological impairment, acute injury/trauma as evidenced by intake 0-25%, intake 26-50%    Nutrition Interventions:   Food and/or Nutrient Delivery: Continue Current Diet, Start Oral Nutrition Supplement  Nutrition Education/Counseling: No recommendation at this time  Coordination of Nutrition Care: Continue to monitor while inpatient       Goals:     Goals: Meet at least 75% of estimated needs, within 7 days, PO intake 50% or greater       Nutrition Monitoring and Evaluation:   Behavioral-Environmental Outcomes: None Identified  Food/Nutrient Intake Outcomes: Food and Nutrient Intake, Supplement Intake  Physical Signs/Symptoms Outcomes: Biochemical Data, Weight, Nutrition Focused Physical Findings, GI Status    Discharge Planning:     Too soon to determine     Rolando Mcdaniel RD  Contact: [phone]

## 2023-02-04 NOTE — CARE COORDINATION
Transitional planning: Phone call to [facility] to confirm if patient can return upon discharge. Was not able to speak with anyone in admissions. Will try again later.

## 2023-02-04 NOTE — PROGRESS NOTES
[facility]  Internal Medicine Teaching Residency Program  Inpatient Daily Progress Note  ______________________________________________________________________________    Patient: Thania Bailey  YOB: 1945   [facility] [place][id number]    Acct: [de-identified]     Room: [address] [place] [address]  Admit date: 2/3/2023  Today's date: 02/04/23  Number of days in the hospital: 1    SUBJECTIVE   Admitting Diagnosis: Colitis  CC: UTI  Pt examined at bedside. Chart & results reviewed. Patient is hemodynamically stable and afebrile  No acute events overnight    ROS:  Constitutional:  negative for chills, fevers, sweats  Respiratory:  negative for cough, dyspnea on exertion, hemoptysis, shortness of breath, wheezing  Cardiovascular:  negative for chest pain, chest pressure/discomfort, lower extremity edema, palpitations  Gastrointestinal:  negative for abdominal pain, constipation, diarrhea, nausea, vomiting  Neurological:  negative for dizziness, headache  BRIEF HISTORY     The patient is a pleasant 68 y.o. female with a past medical history significant for recurrent UTIs, recent pneumonia and recent COVID diagnosis. Patient also has a past medical history of CKD stage IIIa with solitary kidney, paroxysmal A. fib, COPD, falls, myelopathy with persistent lower extremity weakness, chronic abdominal pain. presented from nursing home after she was found altered and had slurred speech and some weakness. Stroke alert were announced upon arrival of the patient because of the neurological deficit CT head and CTA were unremarkable. Patient reported that every time she has the same symptoms he is found to have UTI that is why he was sent to the hospital.   In the ED she was found to have UTI and culture sent.     OBJECTIVE     Vital Signs:  BP (!) 90/58   Pulse 80   Temp 98.9 °F (37.2 °C) (Oral)   Resp 14   Ht 5' 2\" (1.575 m)   Wt 181 lb (82.1 kg)   SpO2 94%   BMI 33.11 kg/m²     Temp (24hrs), Av.5 °F (37.5 °C), Min:98.5 °F (36.9 °C), Max:102 °F (38.9 °C)    No intake/output data recorded. Body Mass Index : Body mass index is 33.11 kg/m². PHYSICAL EXAMINATION:  Constitutional: This is a well developed, well nourished, 30-34.9 - Obesity Grade I 68y.o. year old female who is alert, oriented, cooperative and in no apparent distress. Head:normocephalic and atraumatic. EENT:  PERRLA. No conjunctival injections. Septum was midline, mucosa was without erythema, exudates or cobblestoning. No thrush was noted. Neck: Supple without thyromegaly. No elevated JVP. Trachea was midline. Respiratory: Chest was symmetrical without dullness to percussion. Breath sounds bilaterally were clear to auscultation. There were no wheezes, rhonchi or rales. There is no intercostal retraction or use of accessory muscles. No egophony noted. Cardiovascular: Regular without murmur, clicks, gallops or rubs. Abdomen: Generalized abdominal tenderness. slightly rounded and soft without organomegaly. No rebound, rigidity or guarding was appreciated. Lymphatic: No lymphadenopathy. Musculoskeletal: Normal curvature of the spine. No gross muscle weakness. Extremities:  No lower extremity edema, ulcerations, tenderness, varicosities or erythema. Muscle size, tone and strength are normal.  No involuntary movements are noted. Skin:  Warm and dry. Good color, turgor and pigmentation. No lesions or scars.   No cyanosis or clubbing  Neurological/Psychiatric: The patient's general behavior, level of consciousness, thought content and emotional status is normal.        Medications:  Scheduled Medications:    sodium chloride  500 mL IntraVENous Once    amitriptyline  25 mg Oral TID    busPIRone  10 mg Oral BID    carboxymethylcellulose PF  1 drop Both Eyes Q6H    Vitamin D  2,000 Units Oral Daily    escitalopram  20 mg Oral Daily    ferrous sulfate  325 mg Oral Daily with breakfast    trospium  20 mg Oral BID AC    fluticasone  1 spray Each Nostril Daily    gabapentin  300 mg Oral TID    guaiFENesin  600 mg Oral Daily    lactobacillus   Oral BID    cetirizine  5 mg Oral Daily    magnesium hydroxide  30 mL Oral Nightly    metoprolol succinate  25 mg Oral Daily    pantoprazole  40 mg Oral BID    QUEtiapine  100 mg Oral Nightly    atorvastatin  40 mg Oral Daily    traZODone  100 mg Oral Nightly    sodium chloride flush  5-40 mL IntraVENous 2 times per day    heparin (porcine)  5,000 Units SubCUTAneous 3 times per day    meropenem  1,000 mg IntraVENous Q8H    calcium carbonate  600 mg Oral BID     Continuous Infusions:    sodium chloride      sodium chloride 50 mL/hr at 02/04/23 0015     PRN Medicationsalbuterol, 2.5 mg, Q6H PRN  aluminum & magnesium hydroxide-simethicone, 5 mL, PRN  bisacodyl, 10 mg, Daily PRN  clonazePAM, 0.5 mg, TID PRN  oxyCODONE-acetaminophen, 1 tablet, Q6H PRN  polyethylene glycol, 17 g, Daily PRN  sodium chloride flush, 5-40 mL, PRN  sodium chloride, , PRN  ondansetron, 4 mg, Q8H PRN   Or  ondansetron, 4 mg, Q6H PRN  acetaminophen, 650 mg, Q6H PRN   Or  acetaminophen, 650 mg, Q6H PRN  bisacodyl, 5 mg, Daily PRN        Diagnostic Labs:  CBC:   Recent Labs     02/03/23  1245   WBC 4.8   RBC 4.57   HGB 13.5   HCT 43.0   MCV 94.1   RDW 14.1        BMP:   Recent Labs     02/03/23  1235 02/03/23  1245   NA  --  134*   K  --  5.0   CL  --  98   CO2  --  22   BUN  --  13   CREATININE 1.04 1.09*     BNP: No results for input(s): BNP in the last 72 hours. PT/INR:   Recent Labs     02/03/23  1245   PROTIME 11.3   INR 1.1     APTT:   Recent Labs     02/03/23  1245   APTT 26.6     CARDIAC ENZYMES: No results for input(s): CKMB, CKMBINDEX, TROPONINI in the last 72 hours.     Invalid input(s): CKTOTAL;3  FASTING LIPID PANEL:  Lab Results   Component Value Date    CHOL 203 (H) 12/14/2020    HDL 43 12/14/2020    TRIG 268 (H) 12/14/2020     LIVER PROFILE: No results for input(s): AST, ALT, ALB, BILIDIR, BILITOT, ALKPHOS in the last 72 hours. MICROBIOLOGY:   Lab Results   Component Value Date/Time    CULTURE NO GROWTH 12 HOURS 02/03/2023 12:40 PM       Imaging:    CT HEAD WO CONTRAST    Result Date: 2/3/2023  No acute intracranial abnormality. Mild parenchymal volume loss. Mild chronic microvascular disease. Results were reported to Dr. Tonya Mead by radiology results communication at 1:08 p.m. on February 3, 2023. CT ABDOMEN PELVIS W IV CONTRAST Additional Contrast? None    Result Date: 2/3/2023  There is significant wall thickening noted within the ascending colon which was not present on prior examination. Finding may be related to colitis. Large arge axial hiatal hernia. status post right nephrectomy, cholecystectomy and hysterectomy. Fatty liver. Extensive stool throughout the transverse colon. XR CHEST PORTABLE    Result Date: 2/3/2023  Mildly improved aeration at the left base with mild residual atelectasis/infiltrate. CTA HEAD NECK W CONTRAST    Result Date: 2/3/2023  50% stenosis in the proximal left internal carotid artery. Mild stenosis in the proximal right internal carotid artery. Otherwise, no acute abnormality or flow-limiting stenosis in the remainder of the major arteries of the head and neck. ASSESSMENT & PLAN     This is a 68 y.o. female who presented with altered mental status and found to have UTI. Principal Problem:     #1 Complicated UTI (urinary tract infection)  -History of ESBL and MDRO  -Patient started on meropenem pending culture and sensitivity  -IV fluid  -ID consulted. Appreciate recommendations     #2 Acute Metabolic encephalopath secondary to UTI  -Management as in #1     #3 Paroxysmal A. Fib  -Rate controlled with metoprolol  -Sinus rhythm on EKG with occasional PVCs  -[id number] score 4, not on any anticoagulation     #4 Diastolic heart failure with preserved ejection fraction  - Last echo on 12/15/2020 showed Left ventricle is normal in size.  Global left ventricular systolic function  is normal. EF 56 %. Mild left ventricular hypertrophy. Grade I (mild) left ventricular diastolic dysfunction. #5 CKD stage IIIa with solitary kidney  -Baseline creatinine looks like around 1  -Monitor VERONICA's  -Avoid nephrotoxic medications     #6 Agoraphobia with anxiety and depression  -Resume home medications     #7 Intractable nausea with acute on chronic abdominal pain with hiatal hernia/chronic GERD status post prior Nissen. - Continue PPI, GI prophylaxis continue antiemetics and other supportive care     #8 Colitis  - Conservative management     #9 Chronic paraplegia   - History of critical illness myelopathy status post prior complicated hospitalization with trach and PEG in remote past with decannulation of trach, removal of PEG without further issues, does use wheelchair for mobilization.    - Continue fall precautions        DVT ppx: Heparin subcu  GI ppx: Protonix     PT/OT: Consulted  Discharge Planning:  consulted    Alexsander Knowles MD  Internal Medicine Resident, PGY-1  [address], [place]  2/4/2023, 2:42 AM

## 2023-02-04 NOTE — PLAN OF CARE
-patient admitted for AMS suspected 2/2 UTI  -ID consulted-currently on meropenem in interim pending their evaluation  -pt w/ h/o [id number] but patient w/ h/o GIB & currently in SR; no h/o stroke per chart    -continue current care  -appreciate ID recommendations  -will hold off on [facility] given h/o GIB and no compelling indication for imminent need for [facility] e.g. prior TIA/stroke    Jose D Zamarripa MD  PGY-3, Department of Internal Medicine  [place], [place]

## 2023-02-04 NOTE — H&P
[address]     Department of Internal Medicine - Staff Internal Medicine Teaching Service          ADMISSION NOTE/HISTORY AND PHYSICAL EXAMINATION   Date: 2/3/2023  Patient Name: Magdaleno Beltran  Date of admission: 2/3/2023 12:30 PM  YOB: 1945  PCP: Katheleen Opitz, DO  History Obtained From:  patient, electronic medical record    CHIEF COMPLAINT     Chief complaint: UTI    HISTORY OF PRESENTING ILLNESS     The patient is a pleasant 68 y.o. female with a past medical history significant for recurrent UTIs, recent pneumonia and recent COVID diagnosis. Patient also has a past medical history of CKD stage IIIa with solitary kidney, paroxysmal A. fib, COPD, falls, myelopathy with persistent lower extremity weakness, chronic abdominal pain. presented from nursing home after she was found altered and had slurred speech and some weakness. Stroke alert were announced upon arrival of the patient because of the neurological deficit CT head and CTA were unremarkable. Patient reported that every time she has the same symptoms he is found to have UTI that is why he was sent to the hospital.   In the ED she was found to have UTI and culture sent.     Review of Systems:  General ROS: Completed and except as mentioned above were negative   HEENT ROS: Completed and except as mentioned above were negative   Allergy and Immunology ROS:  Completed and except as mentioned above were negative  Hematological and Lymphatic ROS:  Completed and except as mentioned above were negative  Respiratory ROS:  Completed and except as mentioned above were negative  Cardiovascular ROS:  Completed and except as mentioned above were negative  Gastrointestinal ROS: Completed and except as mentioned above were negative  Genito-Urinary ROS:  Completed and except as mentioned above were negative  Musculoskeletal ROS:  Completed and except as mentioned above were negative  Neurological ROS:  Completed and except as mentioned above were negative  Skin & Dermatological ROS:  Completed and except as mentioned above were negative  Psychological ROS:  Completed and except as mentioned above were negative    PAST MEDICAL HISTORY     Past Medical History:   Diagnosis Date    A-fib ([facility]     Acquired absence of kidney     Adult hypertrophic pyloric stenosis     Agoraphobia with panic disorder     Agoraphobia without history of panic disorder     Allergic rhinitis     Anemia, unspecified     Anxiety     Anxiety disorder     Arthritis     Atrial fibrillation (HCC)     Back pain     Bronchitis     Caffeine use     8 coffee / day    Cardiomegaly     Carpal tunnel syndrome     Cataracts, bilateral     Centriacinar emphysema (HCC)     Chronic kidney disease, stage III (moderate) (HCC)     Chronic pain     Constipation     Constipation     COPD (chronic obstructive pulmonary disease) (HCC)     Emphysema    Cystitis with hematuria     Depression     Diaphragmatic hernia without obstruction or gangrene     Diarrhea     Difficulty walking     Dry eye syndrome of unspecified lacrimal gland     Esophageal obstruction     FOM (frequency of micturition)     Foot drop, right foot     Gastro-esophageal reflux disease with esophagitis, with bleeding     GERD (gastroesophageal reflux disease)     H/O gastric ulcer     HTN (hypertension)     Hyperlipidemia     Hypertension, essential     Klebsiella pneumoniae (k. pneumoniae) as the cause of diseases classified elsewhere     Major depression, recurrent (HCC)     Mitral valve prolapse     Mixed hyperlipidemia     Mumps     Muscle weakness (generalized)     MVP (mitral valve prolapse)     Dr. Oleary Schools in May 2019    Old myocardial infarction     Personal history of disease of digestive system     Psychophysiological insomnia     Recurrent UTI     Dr. Quoc Bowers    S/P PICC central line placement 08/05/2022    \"no longer present\" per Chago Diaz (Nurse) at [address] [place] [address].     Sepsis ([facility]     Sinusitis Tracheostomy in place [facility])     \"No longer present\" per Chago Diaz (Nurse) at [facility] [place].     Ulcerative esophagitis     Urinary tract infection, site not specified     Uses wheelchair     Wears glasses     Wellness examination     Dr. Gabi Morse seen in Jan 2020       PAST SURGICAL HISTORY     Past Surgical History:   Procedure Laterality Date    APPENDECTOMY      CARPAL [address], LAPAROSCOPIC N/A 05/22/2020    XI LAPAROSCOPIC ROBOTIC CHOLECYSTECTOMY, PYLOROPLASTY performed by Belén Romero MD at [address] [facility]      COLONOSCOPY N/A 1/28/2022    COLONOSCOPY POLYPECTOMY HOT [facility]       ERCP  05/21/2020    with stent insertion, balloon dilation, sphinctereotomy    ERCP  05/21/2020    ** CASE IN OR WITY GI STAFF** ERCP STENT INSERTION performed by Elida Weber MD at [facility]    ERCP  05/21/2020    ** CASE IN OR WITH GI STAFF**ERCP SPHINCTER/PAPILLOTOMY performed by Elida Weber MD at [facility]    ERCP  05/21/2020    ** CASE IN OR WITH GI STAFF**ERCP DILATION BALLOON performed by Elida Weber MD at [facility]    ERCP N/A 2/8/2021    ERCP STENT REMOVAL performed by Nivia Verma MD at [facility]    ERCP  2/8/2021    ERCP STONE REMOVAL performed by Nivia Verma MD at [address] [place] [address]    GASTRIC FUNDOPLICATION N/A 65/47/8231    XI LAPAROSCOPIC ROBOTIC [address], CHERYL FUNDOPLICATION performed by Belén Romero MD at [address] 03/27/2020    EGD PEG TUBE PLACEMENT performed by Belén Romero MD at [address] N/A 2/8/2021    **CASE IN O.R. W/ GI STAFF - EGD WITH REMOVAL PEG TUBE performed by Nivia Verma MD at [facility]    HAND SURGERY      [facility] [place]. CATH POWER PICC TRIPLE  03/04/2020         HERNIA REPAIR      Hiatal hernia    HYSTERECTOMY (CERVIX STATUS UNKNOWN)      Abdominal    IR NONTUNNELED VASCULAR CATHETER  5/10/2022    IR NONTUNNELED VASCULAR CATHETER 5/10/2022 Daly Grace MD [facility]    JOINT REPLACEMENT Right     right hip    OVARY REMOVAL      RHINOPLASTY      TONSILLECTOMY      TOTAL HIP ARTHROPLASTY      TOTAL NEPHRECTOMY      atrophic from infections, age 13    TRACHEOSTOMY N/A 03/27/2020    **ADD ON **WANTS 10:00AM **TRACHEOTOMY performed by Renata Loja MD at [address] N/A 04/02/2020    TRACHEOTOMY EXCHANGE performed by Renata Loja MD at [address] 03/17/2020    BEDSIDE EGD ESOPHAGOGASTRODUODENOSCOPY (ICU) performed by Renata Loja MD at [address] [place] [address],[facility] 05/18/2020    EGD BIOPSY performed by Ruy Emanuel MD at [address] [place] [address],[facility]  05/18/2020    EGD DILATION BALLOON performed by Ruy Emanuel MD at [address] [place] [address],[facility] N/A 07/06/2020    ** CASE IN O.R. WITH GI STAFF** EGD ESOPHAGOGASTRODUODENOSCOPY performed by Raven Lee MD at [address] [place] [address],[facility] 11/09/2020    EGD DIAGNOSTIC ONLY performed by Nicholas Styles MD at [address] [place] [address],[facility]  02/08/2021    - EGD WITH REMOVAL PEG TUBE,ERCP STENT REMOVAL,ERCP STONE REMOVAL    UPPER GASTROINTESTINAL ENDOSCOPY N/A 11/24/2021    EGD BIOPSY performed by Nicholas Styles MD at [address] [place] [address],[facility] 9/8/2022    EGD BIOPSY performed by Ruy Emanuel MD at [address] [place] [address] N/A 10/11/2022    EGD BIOPSY performed by Ruy Emanuel MD at [address] [place] [address]     Prednisone, Compazine [prochlorperazine maleate], Penicillin v potassium, and Penicillins    MEDICATIONS PRIOR TO ADMISSION     Prior to Admission medications    Medication Sig Start Date End Date Taking?  Authorizing Provider   ondansetron (ZOFRAN-ODT) 4 MG disintegrating tablet Take 1 tablet by mouth every morning (before breakfast) 2/3/23   Antoine Houser MD   pantoprazole (PROTONIX) 40 MG tablet Take 1 tablet by mouth 2 times daily 2/3/23   Palmira Black MD   oxyCODONE-acetaminophen (PERCOCET) 5-325 MG per tablet  1/29/23   Historical Provider, MD   amitriptyline (ELAVIL) 25 MG tablet Take 1 tablet by mouth in the morning, at noon, and at bedtime 12/27/22   Historical Provider, MD   fesoterodine (TOVIAZ) 4 MG TB24 ER tablet Take 1 tablet by mouth 12/5/22   Historical Provider, MD   gabapentin (NEURONTIN) 300 MG capsule Take 1 capsule by mouth in the morning, at noon, and at bedtime. 12/30/22   Historical Provider, MD   escitalopram (LEXAPRO) 20 MG tablet Take 1 tablet by mouth daily 12/5/22   Historical Provider, MD   QUEtiapine (SEROQUEL) 50 MG tablet Take 1 tablet by mouth daily In the morning 12/19/22   Historical Provider, MD   Sodium Phosphates (FLEET) 7-19 GM/118ML Place 1 enema rectally once as needed    Historical Provider, MD   aluminum & magnesium hydroxide-simethicone (MAALOX) 200-200-20 MG/5ML SUSP suspension Take 5 mLs by mouth as needed for Indigestion    Historical Provider, MD   traZODone (DESYREL) 100 MG tablet Take 100 mg by mouth nightly    Historical Provider, MD   ondansetron (ZOFRAN) 4 MG tablet Take 4 mg by mouth every 8 hours as needed for Nausea or Vomiting    Historical Provider, MD   albuterol (PROVENTIL) (2.5 MG/3ML) 0.083% nebulizer solution Take 2.5 mg by nebulization every 6 hours as needed for Wheezing    Historical Provider, MD   carboxymethylcellulose 1 % ophthalmic solution Place 1 drop into both eyes in the morning and 1 drop at noon and 1 drop in the evening and 1 drop before bedtime.     Historical Provider, MD   magnesium hydroxide (MILK OF MAGNESIA) 400 MG/5ML suspension Take 30 mLs by mouth Give 30ml by mouth as needed for constipation no BM x3 days    Historical Provider, MD   acetaminophen (TYLENOL) 500 MG tablet Take 1,000 mg by mouth in the morning and at bedtime    Historical Provider, MD   Cholecalciferol (VITAMIN D) 50 MCG (2000 UT) CAPS capsule Take 1 capsule by mouth daily 5/10/22   Mena Goetz DO   guaiFENesin (MUCINEX) 600 MG extended release tablet Take 400 mg by mouth in the morning. Indications: Cough. As needed for mucus. Historical Provider, MD   Lactobacillus TABS Take 1 tablet by mouth 2 times daily Take One tablet by mouth two times daily    Historical Provider, MD   fluticasone (FLONASE) 50 MCG/ACT nasal spray 1 spray by Each Nostril route daily    Historical Provider, MD   loratadine (CLARITIN) 10 MG capsule Take 10 mg by mouth daily    Historical Provider, MD   clonazePAM (KLONOPIN) 0.5 MG tablet Take 0.5 mg by mouth 3 times daily as needed (tid). Historical Provider, MD   metoclopramide (REGLAN) 5 MG tablet Take 1 tablet by mouth 4 times daily 1/4/22   Antoine Houser MD   polyethylene glycol (GLYCOLAX) 17 g packet Take 17 g by mouth daily as needed for Constipation 12/19/21   Laurel Shi MD   QUEtiapine (SEROQUEL) 100 MG tablet Take 1 tablet by mouth nightly for 3 days 12/19/21 1/2/23  Laurel Shi MD   Folic Acid-Cholecalciferol 1-2000 MG-UNIT TABS Take by mouth    Historical Provider, MD   busPIRone (BUSPAR) 10 MG tablet Take 1 tablet by mouth 2 times daily 11/30/21   Siobhan Arzola MD   bisacodyl (DULCOLAX) 10 MG suppository Place 10 mg rectally daily as needed for Constipation PRN for constipation no BM x4 days.     Historical Provider, MD   ferrous sulfate (FE TABS 325) 325 (65 Fe) MG EC tablet Take 1 tablet by mouth daily (with breakfast) 12/16/20   MARGOTH Zayas - NP   metoprolol succinate (TOPROL XL) 25 MG extended release tablet Take 1 tablet by mouth daily 5/30/20   Monroe Lr MD   sucralfate (CARAFATE) 1 GM tablet Take 1 tablet by mouth 4 times daily 4/6/20   Karl Rodriguez MD   Oyster Shell 500 MG TABS Take 500 mg by mouth 2 times daily     Historical Provider, MD simvastatin (ZOCOR) 40 MG tablet Take 40 mg by mouth nightly. Historical Provider, MD       SOCIAL HISTORY     Tobacco: Former  Alcohol: No  Illicits: No    FAMILY HISTORY     Family History   Problem Relation Age of Onset    Cancer Mother         uterine    Heart Attack Mother     Heart Attack Father     Other Maternal Grandfather         foot gangrene    Other Paternal Grandmother         bleeding ulcers    Other Paternal Grandfather         lung cancer       PHYSICAL EXAM     Vitals: BP (!) 117/47   Pulse 76   Temp 98.5 °F (36.9 °C) (Oral)   Resp 15   Ht 5' 2\" (1.575 m)   Wt 181 lb (82.1 kg)   SpO2 96%   BMI 33.11 kg/m²   Tmax: Temp (24hrs), Av.3 °F (37.9 °C), Min:98.5 °F (36.9 °C), Max:102 °F (38.9 °C)    Last Body weight:   Wt Readings from Last 3 Encounters:   23 181 lb (82.1 kg)   23 181 lb 14.1 oz (82.5 kg)   10/11/22 175 lb (79.4 kg)     Body Mass Index : Body mass index is 33.11 kg/m². PHYSICAL EXAMINATION:  Constitutional: This is a well developed, well nourished, 30-34.9 - Obesity Grade I 68y.o. year old female who is alert, oriented, cooperative and in no apparent distress. Head:normocephalic and atraumatic. EENT:  PERRLA. No conjunctival injections. Septum was midline, mucosa was without erythema, exudates or cobblestoning. No thrush was noted. Neck: Supple without thyromegaly. No elevated JVP. Trachea was midline. Respiratory: Chest was symmetrical without dullness to percussion. Breath sounds bilaterally were clear to auscultation. There were no wheezes, rhonchi or rales. There is no intercostal retraction or use of accessory muscles. No egophony noted. Cardiovascular: Regular without murmur, clicks, gallops or rubs. Abdomen: Generalized abdominal tenderness. slightly rounded and soft without organomegaly. No rebound, rigidity or guarding was appreciated. Lymphatic: No lymphadenopathy. Musculoskeletal: Normal curvature of the spine.   No gross muscle weakness. Extremities:  No lower extremity edema, ulcerations, tenderness, varicosities or erythema. Muscle size, tone and strength are normal.  No involuntary movements are noted. Skin:  Warm and dry. Good color, turgor and pigmentation. No lesions or scars.   No cyanosis or clubbing  Neurological/Psychiatric: The patient's general behavior, level of consciousness, thought content and emotional status is normal.          INVESTIGATIONS     Laboratory Testing:     Recent Results (from the past 24 hour(s))   Venous Blood Gas, POC    Collection Time: 02/03/23 12:35 PM   Result Value Ref Range    pH, Nimesh 7.448 (H) 7.320 - 7.430    pCO2, Nimesh 35.7 (L) 41.0 - 51.0 mm Hg    pO2, Nimesh 29.4 (L) 30.0 - 50.0 mm Hg    HCO3, Venous 24.7 22.0 - 29.0 mmol/L    Positive Base Excess, Nimesh 1 0.0 - 3.0    O2 Sat, Nimesh 60 60.0 - 85.0 %   ELECTROLYTES PLUS    Collection Time: 02/03/23 12:35 PM   Result Value Ref Range    POC Sodium 127 (L) 138 - 146 mmol/L    POC Potassium 4.7 (H) 3.5 - 4.5 mmol/L    POC Chloride 99 98 - 107 mmol/L    POC TCO2 24 22 - 30 mmol/L    Anion Gap 5 (L) 7 - 16 mmol/L   Hemoglobin and hematocrit, blood    Collection Time: 02/03/23 12:35 PM   Result Value Ref Range    POC Hemoglobin 14.7 12.0 - 16.0 g/dL    POC Hematocrit 43 36 - 46 %   Creatinine W/GFR Point of Care    Collection Time: 02/03/23 12:35 PM   Result Value Ref Range    POC Creatinine 1.04 0.51 - 1.19 mg/dL    eGFR, POC 55 mL/min/1.73m2   CALCIUM, IONIC (POC)    Collection Time: 02/03/23 12:35 PM   Result Value Ref Range    POC Ionized Calcium 1.13 (L) 1.15 - 1.33 mmol/L   POCT urea (BUN)    Collection Time: 02/03/23 12:35 PM   Result Value Ref Range    POC BUN 14 8 - 26 mg/dL   Lactic Acid, POC    Collection Time: 02/03/23 12:35 PM   Result Value Ref Range    POC Lactic Acid 1.92 (H) 0.56 - 1.39 mmol/L   POCT Glucose    Collection Time: 02/03/23 12:35 PM   Result Value Ref Range    POC Glucose 90 74 - 100 mg/dL   Culture, Blood 1 Collection Time: 02/03/23 12:37 PM    Specimen: Blood   Result Value Ref Range    Specimen Description . BLOOD     Special Requests R AC 10 ML     Culture NO GROWTH <24 HRS    Culture, Blood 1    Collection Time: 02/03/23 12:40 PM    Specimen: Blood   Result Value Ref Range    Specimen Description . BLOOD     Special Requests L HAND 10 ML     Culture NO GROWTH <24 HRS    STROKE PANEL    Collection Time: 02/03/23 12:45 PM   Result Value Ref Range    Glucose 87 70 - 99 mg/dL    BUN 13 8 - 23 mg/dL    Creatinine 1.09 (H) 0.50 - 0.90 mg/dL    Est, Glom Filt Rate 52 (L) >60 mL/min/1.73m2    Calcium 9.5 8.6 - 10.4 mg/dL    Sodium 134 (L) 135 - 144 mmol/L    Potassium 5.0 3.7 - 5.3 mmol/L    Chloride 98 98 - 107 mmol/L    CO2 22 20 - 31 mmol/L    Anion Gap 14 9 - 17 mmol/L    WBC 4.8 3.5 - 11.3 k/uL    RBC 4.57 3.95 - 5.11 m/uL    Hemoglobin 13.5 11.9 - 15.1 g/dL    Hematocrit 43.0 36.3 - 47.1 %    MCV 94.1 82.6 - 102.9 fL    MCH 29.5 25.2 - 33.5 pg    MCHC 31.4 28.4 - 34.8 g/dL    RDW 14.1 11.8 - 14.4 %    Platelets 050 913 - 164 k/uL    MPV 10.7 8.1 - 13.5 fL    NRBC Automated 0.0 0.0 per 100 WBC    Total CK 55 26 - 192 U/L    Immature Granulocytes 3 (H) 0 %    Seg Neutrophils 76 (H) 36 - 66 %    Lymphocytes 13 (L) 24 - 44 %    Monocytes 7 1 - 7 %    Eosinophils % 1 1 - 4 %    Basophils 0 0 - 2 %    Absolute Immature Granulocyte 0.14 0.00 - 0.30 k/uL    Segs Absolute 3.65 1.8 - 7.7 k/uL    Absolute Lymph # 0.62 (L) 1.0 - 4.8 k/uL    Absolute Mono # 0.34 0.1 - 0.8 k/uL    Absolute Eos # 0.05 0.0 - 0.4 k/uL    Basophils Absolute 0.00 0.0 - 0.2 k/uL    Morphology Normal     Myoglobin 40 25 - 58 ng/mL    Protime 11.3 9.1 - 12.3 sec    INR 1.1     PTT 26.6 20.5 - 30.5 sec    Troponin, High Sensitivity 20 (H) 0 - 14 ng/L   C-Reactive Protein    Collection Time: 02/03/23 12:45 PM   Result Value Ref Range    CRP 29.4 (H) 0.0 - 5.0 mg/L   Procalcitonin    Collection Time: 02/03/23 12:45 PM   Result Value Ref Range    Procalcitonin 0.09 (H) <0.09 ng/mL   Lactic Acid    Collection Time: 02/03/23 12:45 PM   Result Value Ref Range    Lactic Acid, Whole Blood 2.6 (H) 0.7 - 2.1 mmol/L   Urinalysis with Microscopic    Collection Time: 02/03/23  1:26 PM   Result Value Ref Range    Color, UA Yellow Yellow    Turbidity UA Clear Clear    Glucose, Ur NEGATIVE NEGATIVE    Bilirubin Urine NEGATIVE NEGATIVE    Ketones, Urine NEGATIVE NEGATIVE    Specific Gravity, UA 1.024 1.005 - 1.030    Urine Hgb NEGATIVE NEGATIVE    pH, UA 8.5 (H) 5.0 - 8.0    Protein, UA NEGATIVE NEGATIVE    Urobilinogen, Urine Normal Normal    Nitrite, Urine NEGATIVE NEGATIVE    Leukocyte Esterase, Urine TRACE (A) NEGATIVE    WBC, UA 10 TO 20 0 - 5 /HPF    RBC, UA 5 TO 10 0 - 4 /HPF    Casts UA  0 - 8 /LPF     0 TO 2 HYALINE Reference range defined for non-centrifuged specimen. Epithelial Cells UA 0 TO 2 0 - 5 /HPF    Bacteria, UA MODERATE (A) None   COVID-19, Rapid    Collection Time: 02/03/23  1:32 PM    Specimen: Nasopharyngeal Swab   Result Value Ref Range    Specimen Description . NASOPHARYNGEAL SWAB     SARS-CoV-2, Rapid Not Detected Not Detected   Lactate, Sepsis    Collection Time: 02/03/23  3:01 PM   Result Value Ref Range    Lactic Acid, Sepsis, Whole Blood 1.4 0.5 - 1.9 mmol/L   Troponin    Collection Time: 02/03/23  3:01 PM   Result Value Ref Range    Troponin, High Sensitivity 24 (H) 0 - 14 ng/L       Imaging:   CT HEAD WO CONTRAST    Result Date: 2/3/2023  No acute intracranial abnormality. Mild parenchymal volume loss. Mild chronic microvascular disease. Results were reported to Dr. Sherrill Roman by radiology results communication at 1:08 p.m. on February 3, 2023. CT ABDOMEN PELVIS W IV CONTRAST Additional Contrast? None    Result Date: 2/3/2023  There is significant wall thickening noted within the ascending colon which was not present on prior examination. Finding may be related to colitis.  Large arge axial hiatal hernia. status post right nephrectomy, cholecystectomy and hysterectomy. Fatty liver. Extensive stool throughout the transverse colon. XR CHEST PORTABLE    Result Date: 2/3/2023  Mildly improved aeration at the left base with mild residual atelectasis/infiltrate. CTA HEAD NECK W CONTRAST    Result Date: 2/3/2023  50% stenosis in the proximal left internal carotid artery. Mild stenosis in the proximal right internal carotid artery. Otherwise, no acute abnormality or flow-limiting stenosis in the remainder of the major arteries of the head and neck. ASSESSMENT & PLAN     IMPRESSION  This is a 68 y.o. female who presented with altered mental status and found to have UTI. Principal Problem:    #1 Complicated UTI (urinary tract infection)  -History of ESBL and MDRO  -Patient started on meropenem pending culture and sensitivity  -IV fluid  -ID consulted. Appreciate recommendations    #2 Acute Metabolic encephalopath secondary to UTI  -Management as in #1    #3 Paroxysmal A. Fib  -Rate controlled with metoprolol  -Sinus rhythm on EKG with occasional PVCs  -[id number]-HGCp score 4, not on any anticoagulation    #4 Diastolic heart failure with preserved ejection fraction  - Last echo on 12/15/2020 showed Left ventricle is normal in size. Global left ventricular systolic function  is normal. EF 56 %. Mild left ventricular hypertrophy. Grade I (mild) left ventricular diastolic dysfunction. #5 CKD stage IIIa with solitary kidney  -Baseline creatinine looks like around 1  -Monitor VERONICA's  -Avoid nephrotoxic medications    #6 Agoraphobia with anxiety and depression  -Resume home medications    #7 Intractable nausea with acute on chronic abdominal pain with hiatal hernia/chronic GERD status post prior Nissen.    - Continue PPI, GI prophylaxis continue antiemetics and other supportive care    #8 Colitis  - Conservative management    #9 Chronic paraplegia   - History of critical illness myelopathy status post prior complicated hospitalization with trach and PEG in remote past with decannulation of trach, removal of PEG without further issues, does use wheelchair for mobilization.    - Continue fall precautions      DVT ppx: Heparin subcu  GI ppx: Protonix    PT/OT: Consulted  Discharge Planning:  consulted    Diana Opitz, MD  Internal Medicine Resident, PGY-1  [facility];  [place], [place]  2/3/2023, 8:26 PM

## 2023-02-04 NOTE — ACP (ADVANCE CARE PLANNING)
Advance Care Planning     Advance Care Planning Activator (Inpatient)  Conversation Note      Date of ACP Conversation: 2/3/2023     Conversation Conducted with: Patient with Decision Making Capacity    ACP Activator: Valeriano Vázquez, [address] [place]:     Current Designated Health Care Decision Maker:     Primary Decision Maker: Gabriel Shepherd - [place] - [phone]  Click here to complete Healthcare Decision Makers including section of the Healthcare Decision Maker Relationship (ie \"Primary\")  Today we documented Decision Maker(s) consistent with Legal Next of Kin hierarchy. Care Preferences    Ventilation: \"If you were in your present state of health and suddenly became very ill and were unable to breathe on your own, what would your preference be about the use of a ventilator (breathing machine) if it were available to you? \"      Would the patient desire the use of ventilator (breathing machine)?: yes    \"If your health worsens and it becomes clear that your chance of recovery is unlikely, what would your preference be about the use of a ventilator (breathing machine) if it were available to you? \"     Would the patient desire the use of ventilator (breathing machine)?: Yes      Resuscitation  \"CPR works best to restart the heart when there is a sudden event, like a heart attack, in someone who is otherwise healthy. Unfortunately, CPR does not typically restart the heart for people who have serious health conditions or who are very sick. \"    \"In the event your heart stopped as a result of an underlying serious health condition, would you want attempts to be made to restart your heart (answer \"yes\" for attempt to resuscitate) or would you prefer a natural death (answer \"no\" for do not attempt to resuscitate)? \" yes       [x] Yes   [] No   Educated Patient / Michael Salon regarding differences between Advance Directives and portable DNR orders.     Length of ACP Conversation in minutes: Conversation Outcomes:  [x] ACP discussion completed  [] Existing advance directive reviewed with patient; no changes to patient's previously recorded wishes  [] New Advance Directive completed  [] Portable Do Not Rescitate prepared for Provider review and signature  [] POLST/POST/MOLST/MOST prepared for Provider review and signature      Follow-up plan:    [] Schedule follow-up conversation to continue planning  [] Referred individual to Provider for additional questions/concerns   [] Advised patient/agent/surrogate to review completed ACP document and update if needed with changes in condition, patient preferences or care setting    [] This note routed to one or more involved healthcare providers

## 2023-02-04 NOTE — CONSULTS
[facility] - Initial Consult Note  Today's Date and Time: 2/4/2023, 2:52 PM    Impression :   Altered  mental status  Prior COVID 19 Confirmed Infection 1/1/23  Vaccination status:  Patient received 3 doses of Virl Binet in 2021. Overdue for Covid booster since 2/19/22. Recent infection will count as a 4th dose of vaccine  She will still need a Bivalent booster in April 2023  Acute hypoxic respiratory distress  Elevated CRP  Paroxysmal Afib  HTN  GERD  COPD  Solitary left kidney  Chronic abdominal pain  Paraplegia  Wheel-chair bound  Chronic gastritis  Anxiety  Hx MDRO and ESBL  PCN allergy    Recommendations:   Meropenem 1 gm IV q 8 hr pending urine cultures    Medical Decision Making/Summary/Discussion:2/4/2023       Infection Control Recommendations   [place] Precautions  Contact Isolation  ESBL, MDRO  Antimicrobial Stewardship Recommendations     Simplification of therapy  Targeted therapy    Coordination of Outpatient Care:   Estimated Length of IV antimicrobials: TBD  Patient will need Midline Catheter Insertion: TBD  Patient will need PICC line Insertion:TBD   Patient will need: Home IV , [place],  SNF,  LTAC: TBD  Patient will need outpatient wound care:No    Chief complaint/reason for consultation:   Altered mentation  Suspected complicated UTI      History of Present Illness:   Ashish Dietz is a 68y.o.-year-old  female who was initially admitted on 2/3/2023. Patient seen at the request of Teresa Clarke. INITIAL HISTORY:    Patient known to our service from prior admission to [facility] on 1-1-23 when she was treated for a fall and for Covid 19. She received Paxlovid.      She has a complicated past medical Hx, including:  Paroxysmal Afib  Essential HTN  GERD  COPD  Solitary left kidney  Chronic abdominal pain  Paraplegia  Wheel-chair bound  Chronic gastritis  Anxiety  Hx MDRO and ESBL with prior ESBL + UTIs  PCN allergy    On current admission 2-3-23 patient was transferred from SNF with slurred speech and altered mentation. She underwent stroke alert evaluation which was negative. Patient in the past has shown altered mentation when a UTI affects her. Cultures taken and patient started on Meropenem. CURRENT EVALUATION : 2/4/2023    Afebrile  VS stable, HTN    Patient somnolent   No complaints  No new issues per RN    Medications reviewed  On meropenem    CT abdomen 2-3-23 with retained stool and mild thickening of wall of ascending colon. Solitary kidney. Labs, X rays reviewed: 2/4/2023    BUN: 13  Cr: 1.08    WBC: 5.1  Hb: 12.6   Plat: 130    Cultures:  Urine:  2-3-23: pending  Blood:  2-3-23: No growth x 2  Sputum :    Wound:      Discussed with patient, RN, Im.    I have personally reviewed the past medical history, past surgical history, medications, social history, and family history, and I have updated the database accordingly.   Past Medical History:     Past Medical History:   Diagnosis Date    A-fib ([facility] [address]     Acquired absence of kidney     Adult hypertrophic pyloric stenosis     Agoraphobia with panic disorder     Agoraphobia without history of panic disorder     Allergic rhinitis     Anemia, unspecified     Anxiety     Anxiety disorder     Arthritis     Atrial fibrillation (HCC)     Back pain     Bronchitis     Caffeine use     8 coffee / day    Cardiomegaly     Carpal tunnel syndrome     Cataracts, bilateral     Centriacinar emphysema (HCC)     Chronic kidney disease, stage III (moderate) (HCC)     Chronic pain     Constipation     Constipation     COPD (chronic obstructive pulmonary disease) (HCC)     Emphysema    Cystitis with hematuria     Depression     Diaphragmatic hernia without obstruction or gangrene     Diarrhea     Difficulty walking     Dry eye syndrome of unspecified lacrimal gland     Esophageal obstruction     FOM (frequency of micturition)     Foot drop, right foot     Gastro-esophageal reflux disease with esophagitis, with bleeding     GERD (gastroesophageal reflux disease)     H/O gastric ulcer     HTN (hypertension)     Hyperlipidemia     Hypertension, essential     Klebsiella pneumoniae (k. pneumoniae) as the cause of diseases classified elsewhere     Major depression, recurrent (HCC)     Mitral valve prolapse     Mixed hyperlipidemia     Mumps     Muscle weakness (generalized)     MVP (mitral valve prolapse)     Dr. Oleary Schools in May 2019    Old myocardial infarction     Personal history of disease of digestive system     Psychophysiological insomnia     Recurrent UTI     Dr. Quoc Bowers    S/P PICC central line placement 08/05/2022    \"no longer present\" per Chago Diaz (Nurse) at [facility] of [place]. Sepsis ([address]     Sinusitis     Tracheostomy in place [facility])     \"No longer present\" per Chago Diaz (Nurse) at [facility] of [place].     Ulcerative esophagitis     Urinary tract infection, site not specified     Uses wheelchair     Wears glasses     Wellness examination     Dr. Gabi Morse seen in Jan 2020       Past Surgical  History:     Past Surgical History:   Procedure Laterality Date    APPENDECTOMY      CARPAL TUNNEL RELEASE      CHOLECYSTECTOMY, LAPAROSCOPIC N/A 05/22/2020    XI LAPAROSCOPIC ROBOTIC CHOLECYSTECTOMY, PYLOROPLASTY performed by Belén Romero MD at [place] [address]      COLONOSCOPY N/A 1/28/2022    COLONOSCOPY POLYPECTOMY HOT performed by Elida Weber MD at [place] [address]      ERCP  05/21/2020    with stent insertion, balloon dilation, sphinctereotomy    ERCP  05/21/2020    ** CASE IN OR WITY GI STAFF** ERCP STENT INSERTION performed by Elida Weber MD at \[facility]\"" Endoscopy    ERCP  05/21/2020    ** CASE IN OR WITH GI STAFF**ERCP SPHINCTER/PAPILLOTOMY performed by Elida Weber MD at \[facility]\"" Endoscopy    ERCP  05/21/2020    ** CASE IN OR WITH GI STAFF**ERCP DILATION BALLOON performed by Elida Weber MD at \[facility]\"" Endoscopy    ERCP N/A 2/8/2021    ERCP STENT REMOVAL performed by Nivia Verma MD at \[facility]\"" Endoscopy    ERCP  2/8/2021    ERCP STONE REMOVAL performed by Dora Michael MD at [address] [facility]    GASTRIC FUNDOPLICATION N/A 47/51/4721    XI LAPAROSCOPIC ROBOTIC HIATAL HERNIA REPAIR, CHERYL FUNDOPLICATION performed by Иван Beal MD at [address] [facility] 03/27/2020    EGD PEG TUBE PLACEMENT performed by Иван Beal MD at [address] [facility] N/A 2/8/2021    **CASE IN O.R. W/ GI STAFF - EGD WITH REMOVAL PEG TUBE performed by Dora [place] MD at \[facility]\"" Endoscopy    HAND SURGERY      [facility]. CATH POWER PICC TRIPLE  03/04/2020         HERNIA REPAIR      Hiatal hernia    HYSTERECTOMY (CERVIX STATUS UNKNOWN)      Abdominal    IR NONTUNNELED VASCULAR CATHETER  5/10/2022    IR NONTUNNELED VASCULAR CATHETER 5/10/2022 Alicia Burt MD [facility]    JOINT REPLACEMENT Right     right hip    OVARY REMOVAL      RHINOPLASTY      TONSILLECTOMY      TOTAL HIP ARTHROPLASTY      TOTAL NEPHRECTOMY      atrophic from infections, age 13    TRACHEOSTOMY N/A 03/27/2020    **ADD ON **WANTS 10:00AM **TRACHEOTOMY performed by Иван Beal MD at [address] [facility] N/A 04/02/2020    TRACHEOTOMY EXCHANGE performed by Иван Beal MD at [address] [facility] 03/17/2020    BEDSIDE EGD ESOPHAGOGASTRODUODENOSCOPY (ICU) performed by Иван Beal MD at [address] [facility] 05/18/2020    EGD BIOPSY performed by Alicja Back MD at [address] [facility]  05/18/2020    EGD DILATION BALLOON performed by Alicja Back MD at [address] [facility] N/A 07/06/2020    ** CASE IN O.R. WITH GI STAFF** EGD ESOPHAGOGASTRODUODENOSCOPY performed by Aide Wilkerson MD at [address] [facility] 11/09/2020    EGD DIAGNOSTIC ONLY performed by Mariel Kilpatrick MD at \[facility]\"" Endoscopy    UPPER GASTROINTESTINAL ENDOSCOPY  02/08/2021    - EGD WITH REMOVAL PEG TUBE,ERCP STENT REMOVAL,ERCP STONE REMOVAL    UPPER GASTROINTESTINAL ENDOSCOPY N/A 11/24/2021    EGD BIOPSY performed by Dahlia Cao MD at [address] N/A 9/8/2022    EGD BIOPSY performed by Debi Padgett MD at [address] N/A 10/11/2022    EGD BIOPSY performed by Debi Padgett MD at [address] [facility]       Medications:      [Held by provider] amitriptyline  25 mg Oral TID    busPIRone  10 mg Oral BID    carboxymethylcellulose PF  1 drop Both Eyes Q6H    Vitamin D  2,000 Units Oral Daily    escitalopram  20 mg Oral Daily    ferrous sulfate  325 mg Oral Daily with breakfast    trospium  20 mg Oral BID AC    [Held by provider] fluticasone  1 spray Each Nostril Daily    gabapentin  300 mg Oral TID    guaiFENesin  600 mg Oral Daily    lactobacillus   Oral BID    [Held by provider] cetirizine  5 mg Oral Daily    magnesium hydroxide  30 mL Oral Nightly    metoprolol succinate  25 mg Oral Daily    pantoprazole  40 mg Oral BID    [Held by provider] QUEtiapine  100 mg Oral Nightly    atorvastatin  40 mg Oral Daily    traZODone  100 mg Oral Nightly    sodium chloride flush  5-40 mL IntraVENous 2 times per day    heparin (porcine)  5,000 Units SubCUTAneous 3 times per day    meropenem  1,000 mg IntraVENous Q8H    calcium carbonate  600 mg Oral BID       Social History:     Social History     Socioeconomic History    Marital status:       Spouse name: Not on file    Number of children: 2    Years of education: Not on file    Highest education level: Not on file   Occupational History    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   Tobacco Use    Smoking status: Former     Packs/day: 1.00     Years: 50.00     Pack years: 50.00     Types: Cigarettes    Smokeless tobacco: Never    Tobacco comments:     quit 1 year ago    Vaping Use    Vaping Use: Former    Substances: Always   Substance and Sexual Activity    Alcohol use: No    Drug use: No    Sexual activity: Never   Other Topics Concern    Not on file   Social History Narrative    Not on file     Social Determinants of Health     Financial Resource Strain: Not on file   Food Insecurity: Not on file   Transportation Needs: Not on file   Physical Activity: Not on file   Stress: Not on file   Social Connections: Not on file   Intimate Partner Violence: Not on file   Housing Stability: Not on file       Family History:     Family History   Problem Relation Age of Onset    Cancer Mother         uterine    Heart Attack Mother     Heart Attack Father     Other Maternal Grandfather         foot gangrene    Other Paternal Grandmother         bleeding ulcers    Other Paternal Grandfather         lung cancer        Allergies:   Prednisone, Compazine [prochlorperazine maleate], Penicillin v potassium, and Penicillins     Review of Systems:     Unable to provide much information for ROS. Somnolent. 2/4/2023       Constitutional: No fevers or chills. No systemic complaints  Head: No headaches  Eyes: No double vision or blurry vision. No conjunctival inflammation. ENT: No sore throat or runny nose. . No hearing loss, tinnitus or vertigo. Cardiovascular: No chest pain or palpitations. No shortness of breath. No HOLLAND  Lung: No shortness of breath or cough. No sputum production  Abdomen: No nausea, vomiting, diarrhea, or abdominal pain. Janet Critchley No cramps. Genitourinary: No increased urinary frequency, or dysuria. No hematuria. No suprapubic or CVA pain  Musculoskeletal: No muscle aches or pains. No joint effusions, swelling or deformities  Hematologic: No bleeding or bruising. Neurologic: No headache, weakness, numbness, or tingling. Integument: No rash, no ulcers. Psychiatric: No depression. Endocrine: No polyuria, no polydipsia, no polyphagia.     Physical Examination :   Patient Vitals for the past 8 hrs:   BP Temp Temp src Pulse Resp SpO2   02/04/23 1153 (!) 159/85 98.6 °F (37 °C) Oral 89 17 99 %   02/04/23 1140 -- -- -- -- 18 --   02/04/23 1135 (!) 148/67 -- -- 82 13 98 %   02/04/23 0755 127/69 98.5 °F (36.9 °C) Oral 92 17 93 %     General Appearance: Somnolent, and in no apparent distress  Head:  Normocephalic, no trauma  Eyes: Pupils equal, round, reactive to light and accommodation; extraocular movements intact; sclera anicteric; conjunctivae pink. No embolic phenomena. ENT: Oropharynx clear, without erythema, exudate, or thrush. No tenderness of sinuses. Mouth/throat: mucosa pink and moist. No lesions. Dentition in good repair. Neck:Supple, without lymphadenopathy. Thyroid normal, No bruits. Pulmonary/Chest: Clear to auscultation, without wheezes, rales, or rhonchi. No dullness to percussion. Cardiovascular: Regular rate and rhythm without murmurs, rubs, or gallops. Abdomen: Soft, non tender. Bowel sounds normal. No organomegaly  All four Extremities: No cyanosis, clubbing, edema, or effusions. Neurologic: No gross sensory or motor deficits. Patient somnolent  Skin: Warm and dry with good turgor. Signs of peripheral arterial insufficiency. No ulcerations. No open wounds. Medical Decision Making -Laboratory:   I have independently reviewed/ordered the following labs:    CBC with Differential:   Recent Labs     02/03/23  1245 02/04/23  0352   WBC 4.8 5.1   HGB 13.5 12.6   HCT 43.0 38.5    See Reflexed IPF Result   LYMPHOPCT 13* 19*   MONOPCT 7 15*     BMP:   Recent Labs     02/03/23  1245 02/04/23  0352   * 136   K 5.0 3.8   CL 98 102   CO2 22 22   BUN 13 13   CREATININE 1.09* 1.08*     Hepatic Function Panel: No results for input(s): PROT, LABALBU, BILIDIR, IBILI, BILITOT, ALKPHOS, ALT, AST in the last 72 hours. No results for input(s): RPR in the last 72 hours. No results for input(s): HIV in the last 72 hours. No results for input(s): BC in the last 72 hours.   Lab Results   Component Value Date/Time    MUCUS NOT REPORTED 12/18/2021 12:26 PM RBC 4.17 02/04/2023 03:52 AM    TRICHOMONAS NOT REPORTED 12/18/2021 12:26 PM    WBC 5.1 02/04/2023 03:52 AM    YEAST NOT REPORTED 12/18/2021 12:26 PM    TURBIDITY Clear 02/03/2023 01:26 PM     Lab Results   Component Value Date/Time    CREATININE 1.08 02/04/2023 03:52 AM    GLUCOSE 94 02/04/2023 03:52 AM       Medical Decision Making-Imaging:     EXAMINATION:   ONE X-RAY VIEW OF THE CHEST       2/3/2023 1:21 pm       COMPARISON:   01/05/2023       HISTORY:   ORDERING SYSTEM PROVIDED HISTORY: sepsis   TECHNOLOGIST PROVIDED HISTORY:   sepsis       FINDINGS:   Heart size is stable. Mediastinal contours are unchanged. There is improved   aeration in the left base with mild residual atelectasis/infiltrate. Patulous esophagus noted. No new airspace consolidation. No pneumothorax or   pleural effusion. Impression   Mildly improved aeration at the left base with mild residual   atelectasis/infiltrate. EXAMINATION:   CT OF THE ABDOMEN AND PELVIS WITH CONTRAST 2/3/2023 12:42 pm       TECHNIQUE:   CT of the abdomen and pelvis was performed with the administration of   intravenous contrast. Multiplanar reformatted images are provided for review. Automated exposure control, iterative reconstruction, and/or weight based   adjustment of the mA/kV was utilized to reduce the radiation dose to as low   as reasonably achievable. COMPARISON:   01/01/2023       HISTORY:   ORDERING SYSTEM PROVIDED HISTORY: febrile, abdomen mass   TECHNOLOGIST PROVIDED HISTORY:   febrile, abdomen mass       Reason for Exam: febrile, abdomen mass       FINDINGS:   LOWER CHEST: Axial hiatal hernia containing part of the stomach. Linear   atelectatic changes within the lower lobes. KIDNEYS AND URINARY TRACT: No renal calculi are identified. There is no   evidence for hydronephrosis. The ureters are of normal course and caliber. Right kidney is not visualized in the right renal fossa, unchanged.   Simple   cyst within the left kidney. ORGANS: Fatty liver. Status post cholecystectomy. Visualized portions of   the liver, spleen, pancreas, gallbladder, and adrenal glands demonstrate no   acute abnormality. GI/BOWEL: No bowel obstruction. No evidence of acute appendicitis. Reported   history of abdominal mass. No discrete mass is noted within the abdomen. There is significant wall thickening noted within the ascending colon which   was not present on prior examination. Finding may be related to colitis. There is large amount of stool throughout the transverse colon. Post Rodriguez PELVIS: The bladder and pelvic organs are unremarkable. Status   posthysterectomy. PERITONEUM/RETROPERITONEUM: No free air or free fluid is noted. No   pathologically enlarged lymphadenopathy. The vasculature do not demonstrate   acute abnormality. BONES/SOFT TISSUES: The osseous structures demonstrate no acute abnormality. Status post right hip arthroplasty. Moderate levoscoliosis. Impression   There is significant wall thickening noted within the ascending colon which   was not present on prior examination. Finding may be related to colitis. Large arge axial hiatal hernia.       status post right nephrectomy, cholecystectomy and hysterectomy. Fatty liver. Extensive stool throughout the transverse colon. Medical Decision [id number]:       Medical Decision Making-Other:     Note:  Labs, medications, radiologic studies were reviewed with personal review of films  Large amounts of data were reviewed  Discussed with nursing Staff, Discharge planner  Infection Control and Prevention measures reviewed  All prior entries were reviewed  Administer medications as ordered  Prognosis: [address]  Discharge planning reviewed  Follow up as outpatient. Thank you for allowing us to participate in the care of this patient. Please call with questions.     Edwin Low MD  Pager: [phone] - Office: [phone]

## 2023-02-04 NOTE — CARE COORDINATION
Case Management Assessment  Initial Evaluation    Date/Time of Evaluation: 2/4/2023 12:47 PM  Assessment Completed by: Sophy Saucedo RN    If patient is discharged prior to next notation, then this note serves as note for discharge by case management. Patient Name: Diogenes Conrad                   YOB: 1945  Diagnosis: Colitis [K52.9]  Acute cystitis without hematuria [W81.62]  Complicated UTI (urinary tract infection) [N39.0]  Altered mental status, unspecified altered mental status type [R41.82]  Pneumonia due to infectious organism, unspecified laterality, unspecified part of lung [J18.9]                   Date / Time: 2/3/2023 12:30 PM    Patient Admission Status: Inpatient   Readmission Risk (Low < 19, Mod (19-27), High > 27): Readmission Risk Score: 21.5    Current PCP: Sundar Packer, DO  PCP verified by CM? (P) Yes Sundar Packer DO)    Chart Reviewed: Yes      History Provided by: (P) Patient  Patient Orientation: (P) Alert and Oriented, Person, Place, Situation, Self    Patient Cognition: (P) Alert    Hospitalization in the last 30 days (Readmission):  Yes    If yes, Readmission Assessment in  Navigator will be completed.     Advance Directives:      Code Status: Full Code   Patient's Primary Decision Maker is: (P) Legal Next of Kin    Primary Decision Maker: Hamilton Ramirez - Child - [phone]    Discharge Planning:    Patient lives with: (P) Alone, Other (Comment) (Lives at [facility]) Type of Home: (P) [address] [facility] (Lives at [facility])  [address] [facility]: (P) Other (Comment) (Staff at [facility])  Patient Support Systems include: (P) Friends/Neighbors, Children   Current Financial resources: (P) Medicaid, Medicare  Current community resources: (P) Transportation  Current services prior to admission: (P) [address] [facility] [address], Oxygen Therapy            Current DME: (P) Wheelchair, Oxygen Therapy (Comment)            Type of Home Care services:  (P) None    ADLS  Prior functional level: (P) Assistance with the following:, Bathing, Dressing, Toileting, Mobility  Current functional level: (P) Assistance with the following:, Bathing, Dressing, Toileting, Mobility    PT AM-PAC:   /24  OT AM-PAC:   /24    Family can provide assistance at DC: (P) Other (comment) (Staff at [facility])  Would you like Case Management to discuss the discharge plan with any other family members/significant others, and if so, who?  no  Plans to Return to Present Housing: (P) Yes  Other Identified Issues/Barriers to RETURNING to current housing: none  Potential Assistance needed at discharge: (P) Transportation            Potential DME:  none  Patient expects to discharge to: (P) Neto Mullins for transportation at discharge: (P) Other (see comment) (Needs transportation)    Financial    Payor: MEDICARE / Plan: MEDICARE PART A AND B / Product Type: *No Product type* /     Does insurance require precert for SNF: No    Potential assistance Purchasing Medications: (P) No  Meds-to-Beds request:      No Pharmacies Listed    Notes:    Factors facilitating achievement of predicted outcomes: Caregiver support, Cooperative, Pleasant, and Home is wheelchair accessible    Barriers to discharge: Limited family support, Decreased endurance, Upper extremity weakness, Lower extremity weakness, and Medical complications    Additional Case Management Notes: Transitional planning: Plan is to return to [facility]. Has needed DME. Will need transportation. Demographics, emergency contact, PCP and insurances confirmed with patient.     The Plan for Transition of Care is related to the following treatment goals of Colitis [K52.9]  Acute cystitis without hematuria [S96.46]  Complicated UTI (urinary tract infection) [N39.0]  Altered mental status, unspecified altered mental status type [R41.82]  Pneumonia due to infectious organism, unspecified laterality, unspecified part of lung [N00.1]    IF APPLICABLE: The Patient and/or patient representative Serina Davison and her family were provided with a choice of provider and agrees with the discharge plan. Freedom of choice list with basic dialogue that supports the patient's individualized plan of care/goals and shares the quality data associated with the providers was provided to: (P) Patient   Patient Representative Name:       The Patient and/or Patient Representative Agree with the Discharge Plan?  (P) Yes    Ayesha Lerma RN  Case Management Department  Ph: [phone] Fax: [phone]

## 2023-02-05 LAB
ABSOLUTE EOS #: 0.06 K/UL (ref 0–0.4)
ABSOLUTE IMMATURE GRANULOCYTE: 0.09 K/UL (ref 0–0.3)
ABSOLUTE LYMPH #: 0.65 K/UL (ref 1–4.8)
ABSOLUTE MONO #: 0.4 K/UL (ref 0.1–0.8)
ANION GAP SERPL CALCULATED.3IONS-SCNC: 7 MMOL/L (ref 9–17)
BASOPHILS # BLD: 1 % (ref 0–2)
BASOPHILS ABSOLUTE: 0.03 K/UL (ref 0–0.2)
BUN SERPL-MCNC: 10 MG/DL (ref 8–23)
CALCIUM SERPL-MCNC: 8.6 MG/DL (ref 8.6–10.4)
CHLORIDE SERPL-SCNC: 102 MMOL/L (ref 98–107)
CO2 SERPL-SCNC: 25 MMOL/L (ref 20–31)
CREAT SERPL-MCNC: 0.88 MG/DL (ref 0.5–0.9)
EKG ATRIAL RATE: 88 BPM
EKG P AXIS: 38 DEGREES
EKG P-R INTERVAL: 144 MS
EKG Q-T INTERVAL: 366 MS
EKG QRS DURATION: 76 MS
EKG QTC CALCULATION (BAZETT): 442 MS
EKG R AXIS: 41 DEGREES
EKG T AXIS: 38 DEGREES
EKG VENTRICULAR RATE: 88 BPM
EOSINOPHILS RELATIVE PERCENT: 2 % (ref 1–4)
GFR SERPL CREATININE-BSD FRML MDRD: >60 ML/MIN/1.73M2
GLUCOSE SERPL-MCNC: 102 MG/DL (ref 70–99)
HCT VFR BLD AUTO: 38.5 % (ref 36.3–47.1)
HGB BLD-MCNC: 12.1 G/DL (ref 11.9–15.1)
IMMATURE GRANULOCYTES: 3 %
LYMPHOCYTES # BLD: 21 % (ref 24–44)
MCH RBC QN AUTO: 29.7 PG (ref 25.2–33.5)
MCHC RBC AUTO-ENTMCNC: 31.4 G/DL (ref 28.4–34.8)
MCV RBC AUTO: 94.4 FL (ref 82.6–102.9)
MICROORGANISM SPEC CULT: ABNORMAL
MONOCYTES # BLD: 13 % (ref 1–7)
MORPHOLOGY: ABNORMAL
NRBC AUTOMATED: 0 PER 100 WBC
PDW BLD-RTO: 14.8 % (ref 11.8–14.4)
PLATELET # BLD AUTO: ABNORMAL K/UL (ref 138–453)
PLATELET, FLUORESCENCE: 147 K/UL (ref 138–453)
PLATELET, IMMATURE FRACTION: 4.5 % (ref 1.1–10.3)
POTASSIUM SERPL-SCNC: 4.3 MMOL/L (ref 3.7–5.3)
RBC # BLD: 4.08 M/UL (ref 3.95–5.11)
SEG NEUTROPHILS: 60 % (ref 36–66)
SEGMENTED NEUTROPHILS ABSOLUTE COUNT: 1.87 K/UL (ref 1.8–7.7)
SODIUM SERPL-SCNC: 134 MMOL/L (ref 135–144)
SPECIMEN DESCRIPTION: ABNORMAL
WBC # BLD AUTO: 3.1 K/UL (ref 3.5–11.3)

## 2023-02-05 PROCEDURE — 6370000000 HC RX 637 (ALT 250 FOR IP): Performed by: INTERNAL MEDICINE

## 2023-02-05 PROCEDURE — 6360000002 HC RX W HCPCS: Performed by: INTERNAL MEDICINE

## 2023-02-05 PROCEDURE — 6370000000 HC RX 637 (ALT 250 FOR IP)

## 2023-02-05 PROCEDURE — 94761 N-INVAS EAR/PLS OXIMETRY MLT: CPT

## 2023-02-05 PROCEDURE — 36415 COLL VENOUS BLD VENIPUNCTURE: CPT

## 2023-02-05 PROCEDURE — 2580000003 HC RX 258

## 2023-02-05 PROCEDURE — 6370000000 HC RX 637 (ALT 250 FOR IP): Performed by: STUDENT IN AN ORGANIZED HEALTH CARE EDUCATION/TRAINING PROGRAM

## 2023-02-05 PROCEDURE — 80048 BASIC METABOLIC PNL TOTAL CA: CPT

## 2023-02-05 PROCEDURE — 2700000000 HC OXYGEN THERAPY PER DAY

## 2023-02-05 PROCEDURE — 6360000002 HC RX W HCPCS

## 2023-02-05 PROCEDURE — 85025 COMPLETE CBC W/AUTO DIFF WBC: CPT

## 2023-02-05 PROCEDURE — 2580000003 HC RX 258: Performed by: STUDENT IN AN ORGANIZED HEALTH CARE EDUCATION/TRAINING PROGRAM

## 2023-02-05 PROCEDURE — 85055 RETICULATED PLATELET ASSAY: CPT

## 2023-02-05 PROCEDURE — 99232 SBSQ HOSP IP/OBS MODERATE 35: CPT | Performed by: INTERNAL MEDICINE

## 2023-02-05 PROCEDURE — 2060000000 HC ICU INTERMEDIATE R&B

## 2023-02-05 PROCEDURE — 2580000003 HC RX 258: Performed by: INTERNAL MEDICINE

## 2023-02-05 PROCEDURE — 93010 ELECTROCARDIOGRAM REPORT: CPT | Performed by: INTERNAL MEDICINE

## 2023-02-05 PROCEDURE — 99233 SBSQ HOSP IP/OBS HIGH 50: CPT | Performed by: INTERNAL MEDICINE

## 2023-02-05 RX ORDER — SODIUM CHLORIDE 9 MG/ML
INJECTION, SOLUTION INTRAVENOUS CONTINUOUS
Status: ACTIVE | OUTPATIENT
Start: 2023-02-05 | End: 2023-02-05

## 2023-02-05 RX ORDER — BENZONATATE 100 MG/1
100 CAPSULE ORAL 3 TIMES DAILY PRN
Status: DISCONTINUED | OUTPATIENT
Start: 2023-02-05 | End: 2023-02-08 | Stop reason: HOSPADM

## 2023-02-05 RX ADMIN — BUSPIRONE HYDROCHLORIDE 10 MG: 10 TABLET ORAL at 08:37

## 2023-02-05 RX ADMIN — HEPARIN SODIUM 5000 UNITS: 5000 INJECTION INTRAVENOUS; SUBCUTANEOUS at 21:30

## 2023-02-05 RX ADMIN — Medication 1 CAPSULE: at 08:37

## 2023-02-05 RX ADMIN — CARBOXYMETHYLCELLULOSE SODIUM 1 DROP: 10 GEL OPHTHALMIC at 21:31

## 2023-02-05 RX ADMIN — ESCITALOPRAM OXALATE 20 MG: 10 TABLET ORAL at 08:37

## 2023-02-05 RX ADMIN — MEROPENEM 1000 MG: 1 INJECTION, POWDER, FOR SOLUTION INTRAVENOUS at 20:11

## 2023-02-05 RX ADMIN — BENZONATATE 100 MG: 100 CAPSULE ORAL at 22:30

## 2023-02-05 RX ADMIN — CARBOXYMETHYLCELLULOSE SODIUM 1 DROP: 10 GEL OPHTHALMIC at 08:36

## 2023-02-05 RX ADMIN — ACETAMINOPHEN 650 MG: 325 TABLET ORAL at 18:37

## 2023-02-05 RX ADMIN — BUSPIRONE HYDROCHLORIDE 10 MG: 10 TABLET ORAL at 20:07

## 2023-02-05 RX ADMIN — OXYCODONE HYDROCHLORIDE AND ACETAMINOPHEN 1 TABLET: 5; 325 TABLET ORAL at 08:51

## 2023-02-05 RX ADMIN — CARBOXYMETHYLCELLULOSE SODIUM 1 DROP: 10 GEL OPHTHALMIC at 04:03

## 2023-02-05 RX ADMIN — Medication 2000 UNITS: at 08:36

## 2023-02-05 RX ADMIN — DESMOPRESSIN ACETATE 40 MG: 0.2 TABLET ORAL at 08:37

## 2023-02-05 RX ADMIN — OXYCODONE HYDROCHLORIDE AND ACETAMINOPHEN 1 TABLET: 5; 325 TABLET ORAL at 15:29

## 2023-02-05 RX ADMIN — OXYCODONE HYDROCHLORIDE AND ACETAMINOPHEN 1 TABLET: 5; 325 TABLET ORAL at 21:33

## 2023-02-05 RX ADMIN — OXYCODONE HYDROCHLORIDE AND ACETAMINOPHEN 1 TABLET: 5; 325 TABLET ORAL at 04:04

## 2023-02-05 RX ADMIN — GABAPENTIN 300 MG: 300 CAPSULE ORAL at 21:30

## 2023-02-05 RX ADMIN — PANTOPRAZOLE SODIUM 40 MG: 40 TABLET, DELAYED RELEASE ORAL at 08:37

## 2023-02-05 RX ADMIN — MEROPENEM 1000 MG: 1 INJECTION, POWDER, FOR SOLUTION INTRAVENOUS at 13:33

## 2023-02-05 RX ADMIN — GUAIFENESIN 600 MG: 600 TABLET, EXTENDED RELEASE ORAL at 08:39

## 2023-02-05 RX ADMIN — BENZONATATE 100 MG: 100 CAPSULE ORAL at 15:29

## 2023-02-05 RX ADMIN — CARBOXYMETHYLCELLULOSE SODIUM 1 DROP: 10 GEL OPHTHALMIC at 15:34

## 2023-02-05 RX ADMIN — MEROPENEM 1000 MG: 1 INJECTION, POWDER, FOR SOLUTION INTRAVENOUS at 04:02

## 2023-02-05 RX ADMIN — GABAPENTIN 300 MG: 300 CAPSULE ORAL at 08:37

## 2023-02-05 RX ADMIN — METOPROLOL SUCCINATE 25 MG: 25 TABLET, FILM COATED, EXTENDED RELEASE ORAL at 08:37

## 2023-02-05 RX ADMIN — HEPARIN SODIUM 5000 UNITS: 5000 INJECTION INTRAVENOUS; SUBCUTANEOUS at 06:21

## 2023-02-05 RX ADMIN — TRAZODONE HYDROCHLORIDE 100 MG: 100 TABLET ORAL at 20:06

## 2023-02-05 RX ADMIN — AMITRIPTYLINE HYDROCHLORIDE 25 MG: 25 TABLET, FILM COATED ORAL at 13:42

## 2023-02-05 RX ADMIN — QUETIAPINE FUMARATE 100 MG: 100 TABLET ORAL at 20:05

## 2023-02-05 RX ADMIN — SODIUM CHLORIDE: 9 INJECTION, SOLUTION INTRAVENOUS at 08:53

## 2023-02-05 RX ADMIN — Medication 600 MG: at 20:07

## 2023-02-05 RX ADMIN — HEPARIN SODIUM 5000 UNITS: 5000 INJECTION INTRAVENOUS; SUBCUTANEOUS at 13:42

## 2023-02-05 RX ADMIN — Medication 600 MG: at 11:28

## 2023-02-05 RX ADMIN — SODIUM CHLORIDE, PRESERVATIVE FREE 10 ML: 5 INJECTION INTRAVENOUS at 20:08

## 2023-02-05 RX ADMIN — GABAPENTIN 300 MG: 300 CAPSULE ORAL at 15:30

## 2023-02-05 RX ADMIN — TROSPIUM CHLORIDE 20 MG: 20 TABLET, FILM COATED ORAL at 08:37

## 2023-02-05 RX ADMIN — Medication 1 CAPSULE: at 20:04

## 2023-02-05 RX ADMIN — FERROUS SULFATE TAB EC 325 MG (65 MG FE EQUIVALENT) 325 MG: 325 (65 FE) TABLET DELAYED RESPONSE at 08:37

## 2023-02-05 RX ADMIN — AMITRIPTYLINE HYDROCHLORIDE 25 MG: 25 TABLET, FILM COATED ORAL at 08:37

## 2023-02-05 RX ADMIN — PANTOPRAZOLE SODIUM 40 MG: 40 TABLET, DELAYED RELEASE ORAL at 20:06

## 2023-02-05 RX ADMIN — TROSPIUM CHLORIDE 20 MG: 20 TABLET, FILM COATED ORAL at 15:30

## 2023-02-05 RX ADMIN — SODIUM CHLORIDE: 9 INJECTION, SOLUTION INTRAVENOUS at 14:22

## 2023-02-05 RX ADMIN — AMITRIPTYLINE HYDROCHLORIDE 25 MG: 25 TABLET, FILM COATED ORAL at 20:05

## 2023-02-05 RX ADMIN — MAGNESIUM HYDROXIDE 30 ML: 400 SUSPENSION ORAL at 20:04

## 2023-02-05 ASSESSMENT — PAIN DESCRIPTION - DESCRIPTORS
DESCRIPTORS: STABBING
DESCRIPTORS: SHARP
DESCRIPTORS: SHARP

## 2023-02-05 ASSESSMENT — PAIN SCALES - GENERAL
PAINLEVEL_OUTOF10: 7
PAINLEVEL_OUTOF10: 7
PAINLEVEL_OUTOF10: 10
PAINLEVEL_OUTOF10: 8
PAINLEVEL_OUTOF10: 7

## 2023-02-05 ASSESSMENT — PAIN DESCRIPTION - LOCATION
LOCATION: ABDOMEN;KNEE
LOCATION: ABDOMEN
LOCATION: ABDOMEN;KNEE

## 2023-02-05 NOTE — CONSULTS
[facility] - Progress Note    Today's Date and Time: 2/5/2023, 7:50 AM    Impression :   Altered  mental status  Prior COVID 19 Confirmed Infection 1/1/23  Vaccination status:  Patient received 3 doses of [place] in 2021. Overdue for Covid booster since 2/19/22. Recent infection will count as a 4th dose of vaccine  She will still need a Bivalent booster in April 2023  Acute hypoxic respiratory distress  Elevated CRP  Paroxysmal Afib  HTN  GERD  COPD  Solitary left kidney  Chronic abdominal pain  Paraplegia  Wheel-chair bound  Chronic gastritis  Anxiety  Hx MDRO and ESBL  PCN allergy    Recommendations:   Meropenem 1 gm IV q 8 hr pending urine cultures    Medical Decision Making/Summary/Discussion:2/5/2023       Infection Control Recommendations   [place] Precautions  Contact Isolation  ESBL, MDRO  Antimicrobial Stewardship Recommendations     Simplification of therapy  Targeted therapy    Coordination of Outpatient Care:   Estimated Length of IV antimicrobials: TBD  Patient will need Midline Catheter Insertion: TBD  Patient will need PICC line Insertion:TBD   Patient will need: Home IV , [place],  SNF,  LTAC: TBD  Patient will need outpatient wound care:No    Chief complaint/reason for consultation:   Altered mentation  Suspected complicated UTI      History of Present Illness:   Leigh Ann Enriquez is a 68y.o.-year-old  female who was initially admitted on 2/3/2023. Patient seen at the request of Lj Meadows. INITIAL HISTORY:    Patient known to our service from prior admission to [facility] on 1-1-23 when she was treated for a fall and for Covid 19. She received Paxlovid.      She has a complicated past medical Hx, including:  Paroxysmal Afib  Essential HTN  GERD  COPD  Solitary left kidney  Chronic abdominal pain  Paraplegia  Wheel-chair bound  Chronic gastritis  Anxiety  Hx MDRO and ESBL with prior ESBL + UTIs  PCN allergy    On current admission 2-3-23 patient was transferred from SNF with slurred speech and altered mentation. She underwent stroke alert evaluation which was negative. Patient in the past has shown altered mentation when a UTI affects her. Cultures taken and patient started on Meropenem. CURRENT EVALUATION : 2/5/2023    Afebrile  VS stable, HTN    Patient stable  No complaints  No new issues per RN    Medications reviewed  On meropenem  Urine culture pending    CT abdomen 2-3-23 with retained stool and mild thickening of wall of ascending colon. Solitary kidney. Labs, X rays reviewed: 2/5/2023    BUN: 13-->10  Cr: 1.08-->0.88    WBC: 5.1-->3.1  Hb: 12.6 -->12.1  Plat: 130-->147    Cultures:  Urine:  2-3-23: pending  Blood:  2-3-23: No growth x 2  Sputum :  1-1-23: Normal teresa  Wound:      Discussed with patient, RN, Im.    I have personally reviewed the past medical history, past surgical history, medications, social history, and family history, and I have updated the database accordingly.   Past Medical History:     Past Medical History:   Diagnosis Date    A-fib ([facility] [address]     Acquired absence of kidney     Adult hypertrophic pyloric stenosis     Agoraphobia with panic disorder     Agoraphobia without history of panic disorder     Allergic rhinitis     Anemia, unspecified     Anxiety     Anxiety disorder     Arthritis     Atrial fibrillation (HCC)     Back pain     Bronchitis     Caffeine use     8 coffee / day    Cardiomegaly     Carpal tunnel syndrome     Cataracts, bilateral     Centriacinar emphysema (HCC)     Chronic kidney disease, stage III (moderate) (HCC)     Chronic pain     Constipation     Constipation     COPD (chronic obstructive pulmonary disease) (HCC)     Emphysema    Cystitis with hematuria     Depression     Diaphragmatic hernia without obstruction or gangrene     Diarrhea     Difficulty walking     Dry eye syndrome of unspecified lacrimal gland     Esophageal obstruction     FOM (frequency of micturition)     Foot drop, right foot     Gastro-esophageal reflux disease with esophagitis, with bleeding     GERD (gastroesophageal reflux disease)     H/O gastric ulcer     HTN (hypertension)     Hyperlipidemia     Hypertension, essential     Klebsiella pneumoniae (k. pneumoniae) as the cause of diseases classified elsewhere     Major depression, recurrent (HCC)     Mitral valve prolapse     Mixed hyperlipidemia     Mumps     Muscle weakness (generalized)     MVP (mitral valve prolapse)     Dr. Ambar Zhao in May 2019    Old myocardial infarction     Personal history of disease of digestive system     Psychophysiological insomnia     Recurrent UTI     Dr. Young Labor    S/P PICC central line placement 08/05/2022    \"no longer present\" per Sin Plane (Nurse) at [facility] of [place]. Sepsis ([address]     Sinusitis     Tracheostomy in place [facility])     \"No longer present\" per Sin Plane (Nurse) at [facility] of [place].     Ulcerative esophagitis     Urinary tract infection, site not specified     Uses wheelchair     Wears glasses     Wellness examination     Dr. Shannon Sainz seen in Jan 2020       Past Surgical  History:     Past Surgical History:   Procedure Laterality Date    APPENDECTOMY      [facility] [address] [facility], LAPAROSCOPIC N/A 05/22/2020    XI LAPAROSCOPIC ROBOTIC CHOLECYSTECTOMY, PYLOROPLASTY performed by Thong Frias MD at [address] [place] [address]      COLONOSCOPY N/A 1/28/2022    COLONOSCOPY POLYPECTOMY HOT performed by Sheela Michelle MD at [place] [address]      ERCP  05/21/2020    with stent insertion, balloon dilation, sphinctereotomy    ERCP  05/21/2020    ** CASE IN OR WITY GI STAFF** ERCP STENT INSERTION performed by Sheela Michelle MD at [facility]    ERCP  05/21/2020    ** CASE IN OR WITH GI STAFF**ERCP SPHINCTER/PAPILLOTOMY performed by Sheela Michelle MD at [facility]    ERCP  05/21/2020    ** CASE IN OR WITH GI STAFF**ERCP DILATION BALLOON performed by Sheela Michelle MD at [facility]    ERCP N/A 2/8/2021    ERCP STENT REMOVAL performed by Holly Keene MD at [facility]    ERCP  2/8/2021    ERCP STONE REMOVAL performed by Holly Keene MD at [address] [facility] [place]    GASTRIC FUNDOPLICATION N/A 33/57/9335    XI LAPAROSCOPIC ROBOTIC [address], CHERYL FUNDOPLICATION performed by Urbano Tian MD at [address] 03/27/2020    EGD PEG TUBE PLACEMENT performed by Urbano Tian MD at [address] N/A 2/8/2021    **CASE IN O.R. W/ GI STAFF - EGD WITH REMOVAL PEG TUBE performed by Holly Keene MD at [facility]    HAND SURGERY      [facility] CATH POWER PICC TRIPLE  03/04/2020         HERNIA REPAIR      Hiatal hernia    HYSTERECTOMY (CERVIX STATUS UNKNOWN)      Abdominal    IR NONTUNNELED VASCULAR CATHETER  5/10/2022    IR NONTUNNELED VASCULAR CATHETER 5/10/2022 Vel Black MD [facility] SPECIAL PROCEDURES    JOINT REPLACEMENT Right     right hip    OVARY REMOVAL      RHINOPLASTY      TONSILLECTOMY      TOTAL HIP ARTHROPLASTY      TOTAL NEPHRECTOMY      atrophic from infections, age 13    TRACHEOSTOMY N/A 03/27/2020    **ADD ON **WANTS 10:00AM **TRACHEOTOMY performed by Urbano Tian MD at [address] [facility] N/A 04/02/2020    TRACHEOTOMY EXCHANGE performed by Urbano Tian MD at [address] [facility] 03/17/2020    BEDSIDE EGD ESOPHAGOGASTRODUODENOSCOPY (ICU) performed by Urbano Tian MD at [address] 05/18/2020    EGD BIOPSY performed by Kd Villa MD at [address]  05/18/2020    EGD DILATION BALLOON performed by Kd Villa MD at [facility] [address] N/A 07/06/2020    ** CASE IN O.R. WITH GI STAFF** EGD ESOPHAGOGASTRODUODENOSCOPY performed by Wayne Sahni MD at [facility] [address] N/A 11/09/2020    EGD DIAGNOSTIC ONLY performed by Mary Bryson MD at [place] [address]  02/08/2021    - EGD WITH REMOVAL PEG TUBE,ERCP STENT REMOVAL,ERCP STONE REMOVAL    UPPER GASTROINTESTINAL ENDOSCOPY N/A 11/24/2021    EGD BIOPSY performed by Mary Bryson MD at [facility] N/A 9/8/2022    EGD BIOPSY performed by Palmira Black MD at [address] [place] [address] N/A 10/11/2022    EGD BIOPSY performed by Palmira Black MD at [facility]       Medications:      [Held by provider] amitriptyline  25 mg Oral TID    busPIRone  10 mg Oral BID    carboxymethylcellulose PF  1 drop Both Eyes Q6H    Vitamin D  2,000 Units Oral Daily    escitalopram  20 mg Oral Daily    ferrous sulfate  325 mg Oral Daily with breakfast    trospium  20 mg Oral BID AC    [Held by provider] fluticasone  1 spray Each Nostril Daily    gabapentin  300 mg Oral TID    guaiFENesin  600 mg Oral Daily    lactobacillus   Oral BID    [Held by provider] cetirizine  5 mg Oral Daily    magnesium hydroxide  30 mL Oral Nightly    metoprolol succinate  25 mg Oral Daily    pantoprazole  40 mg Oral BID    [Held by provider] QUEtiapine  100 mg Oral Nightly    atorvastatin  40 mg Oral Daily    traZODone  100 mg Oral Nightly    sodium chloride flush  5-40 mL IntraVENous 2 times per day    heparin (porcine)  5,000 Units SubCUTAneous 3 times per day    meropenem  1,000 mg IntraVENous Q8H    calcium carbonate  600 mg Oral BID       Social History:     Social History     Socioeconomic History    Marital status:       Spouse name: Not on file    Number of children: 2    Years of education: Not on file    Highest education level: Not on file   Occupational History    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   Tobacco Use    Smoking status: Former     Packs/day: 1.00     Years: 50.00     Pack years: 50.00     Types: Cigarettes    Smokeless tobacco: Never    Tobacco comments:     quit 1 year ago    Vaping Use    Vaping Use: Former    Substances: Always   Substance and Sexual Activity    Alcohol use: No    Drug use: No    Sexual activity: Never   Other Topics Concern    Not on file   Social History Narrative    Not on file     Social Determinants of Health     Financial Resource Strain: Not on file   Food Insecurity: Not on file   Transportation Needs: Not on file   Physical Activity: Not on file   Stress: Not on file   Social Connections: Not on file   Intimate Partner Violence: Not on file   Housing Stability: Not on file       Family History:     Family History   Problem Relation Age of Onset    Cancer Mother         uterine    Heart Attack Mother     Heart Attack Father     Other Maternal Grandfather         foot gangrene    Other Paternal Grandmother         bleeding ulcers    Other Paternal Grandfather         lung cancer        Allergies:   Prednisone, Compazine [prochlorperazine maleate], Penicillin v potassium, and Penicillins     Review of Systems:     Unable to provide much information for ROS. Somnolent. 2/5/2023       Constitutional: No fevers or chills. No systemic complaints  Head: No headaches  Eyes: No double vision or blurry vision. No conjunctival inflammation. ENT: No sore throat or runny nose. . No hearing loss, tinnitus or vertigo. Cardiovascular: No chest pain or palpitations. No shortness of breath. No HOLLAND  Lung: No shortness of breath or cough. No sputum production  Abdomen: No nausea, vomiting, diarrhea, or abdominal pain. Marget [place] No cramps. Genitourinary: No increased urinary frequency, or dysuria. No hematuria. No suprapubic or CVA pain  Musculoskeletal: No muscle aches or pains. No joint effusions, swelling or deformities  Hematologic: No bleeding or bruising. Neurologic: No headache, weakness, numbness, or tingling. Integument: No rash, no ulcers. Psychiatric: No depression. Endocrine: No polyuria, no polydipsia, no polyphagia.     Physical Examination :   Patient Vitals for the past 8 hrs:   BP Temp Temp src Pulse Resp SpO2   02/05/23 0404 (!) 149/74 -- -- 76 14 94 %   02/05/23 0400 -- 98.2 °F (36.8 °C) Oral 76 13 --       General Appearance: Somnolent, and in no apparent distress  Head:  Normocephalic, no trauma  Eyes: Pupils equal, round, reactive to light and accommodation; extraocular movements intact; sclera anicteric; conjunctivae pink. No embolic phenomena. ENT: Oropharynx clear, without erythema, exudate, or thrush. No tenderness of sinuses. Mouth/throat: mucosa pink and moist. No lesions. Dentition in good repair. Neck:Supple, without lymphadenopathy. Thyroid normal, No bruits. Pulmonary/Chest: Clear to auscultation, without wheezes, rales, or rhonchi. No dullness to percussion. Cardiovascular: Regular rate and rhythm without murmurs, rubs, or gallops. Abdomen: Soft, non tender. Bowel sounds normal. No organomegaly  All four Extremities: No cyanosis, clubbing, edema, or effusions. Neurologic: No gross sensory or motor deficits. Patient somnolent  Skin: Warm and dry with good turgor. Signs of peripheral arterial insufficiency. No ulcerations. No open wounds. Medical Decision Making -Laboratory:   I have independently reviewed/ordered the following labs:    CBC with Differential:   Recent Labs     02/04/23  0352 02/05/23  0257   WBC 5.1 3.1*   HGB 12.6 12.1   HCT 38.5 38.5   PLT See Reflexed IPF Result See Reflexed IPF Result   LYMPHOPCT 19* 21*   MONOPCT 15* 13*       BMP:   Recent Labs     02/04/23  0352 02/05/23  0257    134*   K 3.8 4.3    102   CO2 22 25   BUN 13 10   CREATININE 1.08* 0.88       Hepatic Function Panel: No results for input(s): PROT, LABALBU, BILIDIR, IBILI, BILITOT, ALKPHOS, ALT, AST in the last 72 hours. No results for input(s): RPR in the last 72 hours. No results for input(s): HIV in the last 72 hours. No results for input(s): BC in the last 72 hours.   Lab Results   Component Value Date/Time    MUCUS NOT REPORTED 12/18/2021 12:26 PM    RBC 4.08 02/05/2023 02:57 AM    TRICHOMONAS NOT REPORTED 12/18/2021 12:26 PM    WBC 3.1 02/05/2023 02:57 AM    YEAST NOT REPORTED 12/18/2021 12:26 PM    TURBIDITY Clear 02/03/2023 01:26 PM     Lab Results   Component Value Date/Time    CREATININE 0.88 02/05/2023 02:57 AM    GLUCOSE 102 02/05/2023 02:57 AM       Medical Decision Making-Imaging:     EXAMINATION:   ONE X-RAY VIEW OF THE CHEST       2/3/2023 1:21 pm       COMPARISON:   01/05/2023       HISTORY:   ORDERING SYSTEM PROVIDED HISTORY: sepsis   TECHNOLOGIST PROVIDED HISTORY:   sepsis       FINDINGS:   Heart size is stable. Mediastinal contours are unchanged. There is improved   aeration in the left base with mild residual atelectasis/infiltrate. Patulous esophagus noted. No new airspace consolidation. No pneumothorax or   pleural effusion. Impression   Mildly improved aeration at the left base with mild residual   atelectasis/infiltrate. EXAMINATION:   CT OF THE ABDOMEN AND PELVIS WITH CONTRAST 2/3/2023 12:42 pm       TECHNIQUE:   CT of the abdomen and pelvis was performed with the administration of   intravenous contrast. Multiplanar reformatted images are provided for review. Automated exposure control, iterative reconstruction, and/or weight based   adjustment of the mA/kV was utilized to reduce the radiation dose to as low   as reasonably achievable. COMPARISON:   01/01/2023       HISTORY:   ORDERING SYSTEM PROVIDED HISTORY: febrile, abdomen mass   TECHNOLOGIST PROVIDED HISTORY:   febrile, abdomen mass       Reason for Exam: febrile, abdomen mass       FINDINGS:   LOWER CHEST: Axial hiatal hernia containing part of the stomach. Linear   atelectatic changes within the lower lobes. KIDNEYS AND URINARY TRACT: No renal calculi are identified. There is no   evidence for hydronephrosis. The ureters are of normal course and caliber. Right kidney is not visualized in the right renal fossa, unchanged.   Simple cyst within the left kidney. ORGANS: Fatty liver. Status post cholecystectomy. Visualized portions of   the liver, spleen, pancreas, gallbladder, and adrenal glands demonstrate no   acute abnormality. GI/BOWEL: No bowel obstruction. No evidence of acute appendicitis. Reported   history of abdominal mass. No discrete mass is noted within the abdomen. There is significant wall thickening noted within the ascending colon which   was not present on prior examination. Finding may be related to colitis. There is large amount of stool throughout the transverse colon. Kaela Freud PELVIS: The bladder and pelvic organs are unremarkable. Status   posthysterectomy. PERITONEUM/RETROPERITONEUM: No free air or free fluid is noted. No   pathologically enlarged lymphadenopathy. The vasculature do not demonstrate   acute abnormality. BONES/SOFT TISSUES: The osseous structures demonstrate no acute abnormality. Status post right hip arthroplasty. Moderate levoscoliosis. Impression   There is significant wall thickening noted within the ascending colon which   was not present on prior examination. Finding may be related to colitis. Large arge axial hiatal hernia.       status post right nephrectomy, cholecystectomy and hysterectomy. Fatty liver. Extensive stool throughout the transverse colon. Medical Decision [id number]:       Medical Decision Making-Other:     Note:  Labs, medications, radiologic studies were reviewed with personal review of films  Large amounts of data were reviewed  Discussed with nursing Staff, Discharge planner  Infection Control and Prevention measures reviewed  All prior entries were reviewed  Administer medications as ordered  Prognosis: [address]  Discharge planning reviewed  Follow up as outpatient. Thank you for allowing us to participate in the care of this patient. Please call with questions.     Duke Esparza MD  Pager: [phone] - Office: [phone]

## 2023-02-05 NOTE — PLAN OF CARE
Problem: Discharge Planning  Goal: Discharge to home or other facility with appropriate resources  Outcome: Progressing     Problem: Safety - Adult  Goal: Free from fall injury  Outcome: Progressing     Problem: ABCDS Injury Assessment  Goal: Absence of physical injury  Outcome: Progressing     Problem: Skin/Tissue Integrity  Goal: Absence of new skin breakdown  Description: 1. Monitor for areas of redness and/or skin breakdown  2. Assess vascular access sites hourly  3. Every 4-6 hours minimum:  Change oxygen saturation probe site  4. Every 4-6 hours:  If on nasal continuous positive airway pressure, respiratory therapy assess nares and determine need for appliance change or resting period.   Outcome: Progressing     Problem: Neurosensory - Adult  Goal: Achieves stable or improved neurological status  Outcome: Progressing     Problem: Cardiovascular - Adult  Goal: Maintains optimal cardiac output and hemodynamic stability  Outcome: Progressing  Goal: Absence of cardiac dysrhythmias or at baseline  Outcome: Progressing     Problem: Skin/Tissue Integrity - Adult  Goal: Skin integrity remains intact  Outcome: Progressing     Problem: Musculoskeletal - Adult  Goal: Return mobility to safest level of function  Outcome: Progressing  Goal: Maintain proper alignment of affected body part  Outcome: Progressing  Goal: Return ADL status to a safe level of function  Outcome: Progressing     Problem: Gastrointestinal - Adult  Goal: Maintains or returns to baseline bowel function  Outcome: Progressing     Problem: Genitourinary - Adult  Goal: Absence of urinary retention  Outcome: Progressing     Problem: Infection - Adult  Goal: Absence of infection at discharge  Outcome: Progressing  Goal: Absence of infection during hospitalization  Outcome: Progressing     Problem: Pain  Goal: Verbalizes/displays adequate comfort level or baseline comfort level  Outcome: Progressing     Problem: Nutrition Deficit:  Goal: Optimize nutritional status  Outcome: Progressing

## 2023-02-05 NOTE — PROGRESS NOTES
[facility]  Internal Medicine Teaching Residency Program  Inpatient Daily Progress Note  ______________________________________________________________________________    Patient: Tonya Cervantes  YOB: 1945   YKW:[id number]    Acct: [de-identified]     Room: [address] [place] [address]  Admit date: 2/3/2023  Today's date: 02/05/23  Number of days in the hospital: 2    SUBJECTIVE   Admitting Diagnosis: <principal problem not specified>  CC: UTI  Pt examined at bedside. Chart & results reviewed. Patient is hemodynamically stable and afebrile  No acute events overnight  Mental status back at baseline  Tolerating oral diet well  In good spirits    ROS:  Constitutional:  negative for chills, fevers, sweats  Respiratory:  negative for cough, dyspnea on exertion, hemoptysis, shortness of breath, wheezing  Cardiovascular:  negative for chest pain, chest pressure/discomfort, lower extremity edema, palpitations  Gastrointestinal:  negative for abdominal pain, constipation, diarrhea, nausea, vomiting  Neurological:  negative for dizziness, headache    BRIEF HISTORY     The patient is a pleasant 68 y.o. female with a past medical history significant for recurrent UTIs, recent pneumonia and recent COVID diagnosis. Patient also has a past medical history of CKD stage IIIa with solitary kidney, paroxysmal A. fib, COPD, falls, myelopathy with persistent lower extremity weakness, chronic abdominal pain. presented from nursing home after she was found altered and had slurred speech and some weakness. Stroke alert were announced upon arrival of the patient because of the neurological deficit CT head and CTA were unremarkable. Patient reported that every time she has the same symptoms he is found to have UTI that is why he was sent to the hospital.   In the ED she was found to have UTI and culture sent.     OBJECTIVE     Vital Signs:  BP (!) 98/51   Pulse 72   Temp 98.6 °F (37 °C)   Resp 13 Ht 5' 2\" (1.575 m)   Wt 181 lb (82.1 kg)   SpO2 95%   BMI 33.11 kg/m²     Temp (24hrs), Av.4 °F (36.9 °C), Min:98 °F (36.7 °C), Max:98.6 °F (37 °C)    In: 2380   Out: 4100 [Urine:4100]    Body Mass Index : Body mass index is 33.11 kg/m². PHYSICAL EXAMINATION:  Constitutional: This is a well developed, well nourished, 30-34.9 - Obesity Grade I 68y.o. year old female who is alert, oriented, cooperative and in no apparent distress. Head:normocephalic and atraumatic. EENT:  PERRLA. No conjunctival injections. Septum was midline, mucosa was without erythema, exudates or cobblestoning. No thrush was noted. Neck: Supple without thyromegaly. No elevated JVP. Trachea was midline. Respiratory: Chest was symmetrical without dullness to percussion. Breath sounds bilaterally were clear to auscultation. There were no wheezes, rhonchi or rales. There is no intercostal retraction or use of accessory muscles. No egophony noted. Cardiovascular: Regular without murmur, clicks, gallops or rubs. Abdomen: Generalized abdominal tenderness. slightly rounded and soft without organomegaly. No rebound, rigidity or guarding was appreciated. Lymphatic: No lymphadenopathy. Musculoskeletal: Normal curvature of the spine. No gross muscle weakness. Extremities:  No lower extremity edema, ulcerations, tenderness, varicosities or erythema. Muscle size, tone and strength are normal.  No involuntary movements are noted. Skin:  Warm and dry. Good color, turgor and pigmentation. No lesions or scars.   No cyanosis or clubbing  Neurological/Psychiatric: The patient's general behavior, level of consciousness, thought content and emotional status is normal.        Medications:  Scheduled Medications:    amitriptyline  25 mg Oral TID    busPIRone  10 mg Oral BID    carboxymethylcellulose PF  1 drop Both Eyes Q6H    Vitamin D  2,000 Units Oral Daily    escitalopram  20 mg Oral Daily    ferrous sulfate  325 mg Oral Daily with breakfast    trospium  20 mg Oral BID AC    [Held by provider] fluticasone  1 spray Each Nostril Daily    gabapentin  300 mg Oral TID    guaiFENesin  600 mg Oral Daily    lactobacillus   Oral BID    [Held by provider] cetirizine  5 mg Oral Daily    magnesium hydroxide  30 mL Oral Nightly    metoprolol succinate  25 mg Oral Daily    pantoprazole  40 mg Oral BID    QUEtiapine  100 mg Oral Nightly    atorvastatin  40 mg Oral Daily    traZODone  100 mg Oral Nightly    sodium chloride flush  5-40 mL IntraVENous 2 times per day    heparin (porcine)  5,000 Units SubCUTAneous 3 times per day    meropenem  1,000 mg IntraVENous Q8H    calcium carbonate  600 mg Oral BID     Continuous Infusions:    sodium chloride 100 mL/hr at 02/05/23 0853    sodium chloride       PRN Medicationsbenzonatate, 100 mg, TID PRN  clonazePAM, 0.5 mg, TID PRN  albuterol, 2.5 mg, Q6H PRN  aluminum & magnesium hydroxide-simethicone, 5 mL, PRN  oxyCODONE-acetaminophen, 1 tablet, Q6H PRN  polyethylene glycol, 17 g, Daily PRN  sodium chloride flush, 5-40 mL, PRN  sodium chloride, , PRN  ondansetron, 4 mg, Q8H PRN   Or  ondansetron, 4 mg, Q6H PRN  acetaminophen, 650 mg, Q6H PRN   Or  acetaminophen, 650 mg, Q6H PRN    Diagnostic Labs:  CBC:   Recent Labs     02/03/23  1245 02/04/23  0352 02/05/23  0257   WBC 4.8 5.1 3.1*   RBC 4.57 4.17 4.08   HGB 13.5 12.6 12.1   HCT 43.0 38.5 38.5   MCV 94.1 92.3 94.4   RDW 14.1 14.7* 14.8*    See Reflexed IPF Result See Reflexed IPF Result       BMP:   Recent Labs     02/03/23  1245 02/04/23  0352 02/05/23  0257   * 136 134*   K 5.0 3.8 4.3   CL 98 102 102   CO2 22 22 25   BUN 13 13 10   CREATININE 1.09* 1.08* 0.88       BNP: No results for input(s): BNP in the last 72 hours.   PT/INR:   Recent Labs     02/03/23  1245   PROTIME 11.3   INR 1.1       APTT:   Recent Labs     02/03/23  1245   APTT 26.6       CARDIAC ENZYMES: No results for input(s): CKMB, CKMBINDEX, TROPONINI in the last 72 hours.    Invalid input(s): CKTOTAL;3  FASTING LIPID PANEL:  Lab Results   Component Value Date    CHOL 203 (H) 12/14/2020    HDL 43 12/14/2020    TRIG 268 (H) 12/14/2020     LIVER PROFILE: No results for input(s): AST, ALT, ALB, BILIDIR, BILITOT, ALKPHOS in the last 72 hours. MICROBIOLOGY:   Lab Results   Component Value Date/Time    CULTURE CULTURE IN PROGRESS 02/03/2023 01:26 PM     Imaging:    CT HEAD WO CONTRAST    Result Date: 2/3/2023  No acute intracranial abnormality. Mild parenchymal volume loss. Mild chronic microvascular disease. Results were reported to Dr. Davina Levi by radiology results communication at 1:08 p.m. on February 3, 2023. CT ABDOMEN PELVIS W IV CONTRAST Additional Contrast? None    Result Date: 2/3/2023  There is significant wall thickening noted within the ascending colon which was not present on prior examination. Finding may be related to colitis. Large arge axial hiatal hernia. status post right nephrectomy, cholecystectomy and hysterectomy. Fatty liver. Extensive stool throughout the transverse colon. XR CHEST PORTABLE    Result Date: 2/3/2023  Mildly improved aeration at the left base with mild residual atelectasis/infiltrate. CTA HEAD NECK W CONTRAST    Result Date: 2/3/2023  50% stenosis in the proximal left internal carotid artery. Mild stenosis in the proximal right internal carotid artery. Otherwise, no acute abnormality or flow-limiting stenosis in the remainder of the major arteries of the head and neck. ASSESSMENT & PLAN     This is a 68 y.o. female who presented with altered mental status and found to have UTI. Principal Problem:  #1 Complicated UTI (urinary tract infection)  -History of ESBL and MDRO  -ID consulted. Appreciate recommendations  -Continue Meropenem. Urine culture and sensitivity results pending. #2 Acute Metabolic encephalopath secondary to UTI  -Resolved     #3 Paroxysmal A.  Fib  -Rate controlled with metoprolol  -Sinus rhythm on EKG with occasional PVCs  -[id number] score 4, not on any anticoagulation     #4 Diastolic heart failure with preserved ejection fraction  - Last echo on 12/15/2020 showed Left ventricle is normal in size. Global left ventricular systolic function  is normal. EF 56 %. Mild left ventricular hypertrophy. Grade I (mild) left ventricular diastolic dysfunction. #5 CKD stage IIIa with solitary kidney  -Baseline creatinine looks like around 1  -Monitor VERONICA's  -Avoid nephrotoxic medications     #6 Agoraphobia with anxiety and depression  -Resume home medications     #7 Intractable nausea with acute on chronic abdominal pain with hiatal hernia/chronic GERD status post prior Nissen. - Continue PPI, GI prophylaxis continue antiemetics and other supportive care     #8 Colitis  - Conservative management     #9 Chronic paraplegia   - History of critical illness myelopathy status post prior complicated hospitalization with trach and PEG in remote past with decannulation of trach, removal of PEG without further issues, does use wheelchair for mobilization.    - Continue fall precautions     DVT ppx: Heparin subcu  GI ppx: Protonix     PT/OT: Consulted  Discharge Planning: Home pending antibiotic adjustment based on culture results    Luis Grider MD  Internal Medicine Resident, PGY-3  [facility];  [place], [place]  2/5/2023, 12:37 PM

## 2023-02-05 NOTE — PLAN OF CARE
Problem: Discharge Planning  Goal: Discharge to home or other facility with appropriate resources  2/4/2023 2050 by Tata Dunn RN  Outcome: Progressing  2/4/2023 1346 by Ranjith Knight RN  Outcome: Progressing  Flowsheets (Taken 2/4/2023 0057 by Tata Dunn RN)  Discharge to home or other facility with appropriate resources: Identify barriers to discharge with patient and caregiver     Problem: Safety - Adult  Goal: Free from fall injury  2/4/2023 2050 by Tata Dunn RN  Outcome: Progressing  2/4/2023 1346 by Ranjith Knight RN  Outcome: Progressing     Problem: ABCDS Injury Assessment  Goal: Absence of physical injury  2/4/2023 2050 by Tata Dunn RN  Outcome: Progressing  2/4/2023 1346 by Ranjith Knight RN  Outcome: Progressing     Problem: Skin/Tissue Integrity  Goal: Absence of new skin breakdown  Description: 1. Monitor for areas of redness and/or skin breakdown  2. Assess vascular access sites hourly  3. Every 4-6 hours minimum:  Change oxygen saturation probe site  4. Every 4-6 hours:  If on nasal continuous positive airway pressure, respiratory therapy assess nares and determine need for appliance change or resting period.   2/4/2023 2050 by Tata Dunn RN  Outcome: Progressing  2/4/2023 1346 by Ranjith Knight RN  Outcome: Progressing     Problem: Neurosensory - Adult  Goal: Achieves stable or improved neurological status  2/4/2023 2050 by Tata Dunn RN  Outcome: Progressing  2/4/2023 1346 by Ranjith Knight RN  Outcome: Progressing     Problem: Cardiovascular - Adult  Goal: Maintains optimal cardiac output and hemodynamic stability  2/4/2023 2050 by Tata Dunn RN  Outcome: Progressing  2/4/2023 1346 by Ranjith Knight RN  Outcome: Progressing  Goal: Absence of cardiac dysrhythmias or at baseline  2/4/2023 2050 by Tata Dunn RN  Outcome: Progressing  2/4/2023 1346 by Ranjith Knight RN  Outcome: Progressing     Problem: Skin/Tissue Integrity - Adult  Goal: Skin integrity remains intact  2/4/2023 2050 by Dee Dee Cleveland RN  Outcome: Progressing  2/4/2023 1346 by Tanesha Doran RN  Outcome: Progressing     Problem: Musculoskeletal - Adult  Goal: Return mobility to safest level of function  2/4/2023 2050 by Dee Dee Cleveland RN  Outcome: Progressing  2/4/2023 1346 by Tanesha Doran RN  Outcome: Progressing  Goal: Maintain proper alignment of affected body part  2/4/2023 2050 by Dee Dee Cleveland RN  Outcome: Progressing  2/4/2023 1346 by Tanesha Doran RN  Outcome: Progressing  Goal: Return ADL status to a safe level of function  2/4/2023 2050 by Dee Dee Cleveland RN  Outcome: Progressing  2/4/2023 1346 by Tanesha Doran RN  Outcome: Progressing     Problem: Gastrointestinal - Adult  Goal: Maintains or returns to baseline bowel function  2/4/2023 2050 by Dee Dee Cleveland RN  Outcome: Progressing  2/4/2023 1346 by Tanesha Doran RN  Outcome: Progressing     Problem: Genitourinary - Adult  Goal: Absence of urinary retention  2/4/2023 2050 by Dee Dee Cleveland RN  Outcome: Progressing  2/4/2023 1346 by Tanesha Doran RN  Outcome: Progressing     Problem: Infection - Adult  Goal: Absence of infection at discharge  2/4/2023 2050 by Dee eDe Cleveland RN  Outcome: Progressing  2/4/2023 1346 by Tanesha Doran RN  Outcome: Progressing  Goal: Absence of infection during hospitalization  2/4/2023 2050 by Dee Dee Cleveland RN  Outcome: Progressing  2/4/2023 1346 by Tanesha Doran RN  Outcome: Progressing     Problem: Pain  Goal: Verbalizes/displays adequate comfort level or baseline comfort level  2/4/2023 2050 by Dee Dee Cleveland RN  Outcome: Progressing  2/4/2023 1346 by Tanesha Doran RN  Outcome: Progressing     Problem: Nutrition Deficit:  Goal: Optimize nutritional status  2/4/2023 2050 by Dee Dee Cleveland RN  Outcome: Progressing  2/4/2023 1539 by Danita Marsh RD  Flowsheets (Taken 2/4/2023 1539)  Nutrient intake appropriate for improving, restoring, or maintaining nutritional needs:   Assess nutritional status and recommend course of action   Recommend appropriate diets, oral nutritional supplements, and vitamin/mineral supplements   Monitor oral intake, labs, and treatment plans

## 2023-02-06 ENCOUNTER — APPOINTMENT (OUTPATIENT)
Dept: ULTRASOUND IMAGING | Age: 78
End: 2023-02-06
Payer: MEDICARE

## 2023-02-06 LAB
ABSOLUTE EOS #: 0.16 K/UL (ref 0–0.4)
ABSOLUTE IMMATURE GRANULOCYTE: 0.11 K/UL (ref 0–0.3)
ABSOLUTE LYMPH #: 0.46 K/UL (ref 1–4.8)
ABSOLUTE MONO #: 0.35 K/UL (ref 0.1–0.8)
ANION GAP SERPL CALCULATED.3IONS-SCNC: 8 MMOL/L (ref 9–17)
BASOPHILS # BLD: 0 % (ref 0–2)
BASOPHILS ABSOLUTE: 0 K/UL (ref 0–0.2)
BUN SERPL-MCNC: 10 MG/DL (ref 8–23)
CALCIUM SERPL-MCNC: 8.4 MG/DL (ref 8.6–10.4)
CHLORIDE SERPL-SCNC: 104 MMOL/L (ref 98–107)
CO2 SERPL-SCNC: 24 MMOL/L (ref 20–31)
CREAT SERPL-MCNC: 0.84 MG/DL (ref 0.5–0.9)
EOSINOPHILS RELATIVE PERCENT: 6 % (ref 1–4)
GFR SERPL CREATININE-BSD FRML MDRD: >60 ML/MIN/1.73M2
GLUCOSE SERPL-MCNC: 94 MG/DL (ref 70–99)
HCT VFR BLD AUTO: 38.8 % (ref 36.3–47.1)
HGB BLD-MCNC: 11.8 G/DL (ref 11.9–15.1)
IMMATURE GRANULOCYTES: 4 %
LYMPHOCYTES # BLD: 17 % (ref 24–44)
MCH RBC QN AUTO: 30.1 PG (ref 25.2–33.5)
MCHC RBC AUTO-ENTMCNC: 30.4 G/DL (ref 28.4–34.8)
MCV RBC AUTO: 99 FL (ref 82.6–102.9)
MONOCYTES # BLD: 13 % (ref 1–7)
MORPHOLOGY: ABNORMAL
NRBC AUTOMATED: 0 PER 100 WBC
PDW BLD-RTO: 14.7 % (ref 11.8–14.4)
PLATELET # BLD AUTO: 127 K/UL (ref 138–453)
PMV BLD AUTO: 10.4 FL (ref 8.1–13.5)
POTASSIUM SERPL-SCNC: 4.2 MMOL/L (ref 3.7–5.3)
RBC # BLD: 3.92 M/UL (ref 3.95–5.11)
SEG NEUTROPHILS: 60 % (ref 36–66)
SEGMENTED NEUTROPHILS ABSOLUTE COUNT: 1.62 K/UL (ref 1.8–7.7)
SODIUM SERPL-SCNC: 136 MMOL/L (ref 135–144)
WBC # BLD AUTO: 2.7 K/UL (ref 3.5–11.3)

## 2023-02-06 PROCEDURE — 99232 SBSQ HOSP IP/OBS MODERATE 35: CPT | Performed by: INTERNAL MEDICINE

## 2023-02-06 PROCEDURE — 85025 COMPLETE CBC W/AUTO DIFF WBC: CPT

## 2023-02-06 PROCEDURE — 6360000002 HC RX W HCPCS: Performed by: INTERNAL MEDICINE

## 2023-02-06 PROCEDURE — 6360000002 HC RX W HCPCS: Performed by: STUDENT IN AN ORGANIZED HEALTH CARE EDUCATION/TRAINING PROGRAM

## 2023-02-06 PROCEDURE — 2580000003 HC RX 258

## 2023-02-06 PROCEDURE — 6370000000 HC RX 637 (ALT 250 FOR IP)

## 2023-02-06 PROCEDURE — 76770 US EXAM ABDO BACK WALL COMP: CPT

## 2023-02-06 PROCEDURE — 6360000002 HC RX W HCPCS

## 2023-02-06 PROCEDURE — 2580000003 HC RX 258: Performed by: INTERNAL MEDICINE

## 2023-02-06 PROCEDURE — 80048 BASIC METABOLIC PNL TOTAL CA: CPT

## 2023-02-06 PROCEDURE — 2060000000 HC ICU INTERMEDIATE R&B

## 2023-02-06 PROCEDURE — 6370000000 HC RX 637 (ALT 250 FOR IP): Performed by: STUDENT IN AN ORGANIZED HEALTH CARE EDUCATION/TRAINING PROGRAM

## 2023-02-06 PROCEDURE — 6370000000 HC RX 637 (ALT 250 FOR IP): Performed by: INTERNAL MEDICINE

## 2023-02-06 PROCEDURE — 36415 COLL VENOUS BLD VENIPUNCTURE: CPT

## 2023-02-06 RX ORDER — KETOROLAC TROMETHAMINE 15 MG/ML
15 INJECTION, SOLUTION INTRAMUSCULAR; INTRAVENOUS EVERY 6 HOURS PRN
Status: DISCONTINUED | OUTPATIENT
Start: 2023-02-06 | End: 2023-02-08

## 2023-02-06 RX ORDER — PHENAZOPYRIDINE HYDROCHLORIDE 100 MG/1
200 TABLET, FILM COATED ORAL
Status: DISCONTINUED | OUTPATIENT
Start: 2023-02-06 | End: 2023-02-08 | Stop reason: HOSPADM

## 2023-02-06 RX ORDER — TAMSULOSIN HYDROCHLORIDE 0.4 MG/1
0.4 CAPSULE ORAL DAILY
Status: DISCONTINUED | OUTPATIENT
Start: 2023-02-06 | End: 2023-02-08

## 2023-02-06 RX ORDER — POLYETHYLENE GLYCOL 3350 17 G/17G
17 POWDER, FOR SOLUTION ORAL DAILY
Status: DISCONTINUED | OUTPATIENT
Start: 2023-02-06 | End: 2023-02-08 | Stop reason: HOSPADM

## 2023-02-06 RX ORDER — OXYCODONE HYDROCHLORIDE AND ACETAMINOPHEN 5; 325 MG/1; MG/1
1 TABLET ORAL EVERY 12 HOURS PRN
Status: DISCONTINUED | OUTPATIENT
Start: 2023-02-06 | End: 2023-02-08 | Stop reason: HOSPADM

## 2023-02-06 RX ADMIN — DESMOPRESSIN ACETATE 40 MG: 0.2 TABLET ORAL at 08:14

## 2023-02-06 RX ADMIN — QUETIAPINE FUMARATE 100 MG: 100 TABLET ORAL at 22:30

## 2023-02-06 RX ADMIN — GABAPENTIN 300 MG: 300 CAPSULE ORAL at 08:15

## 2023-02-06 RX ADMIN — Medication 600 MG: at 22:30

## 2023-02-06 RX ADMIN — TROSPIUM CHLORIDE 20 MG: 20 TABLET, FILM COATED ORAL at 17:03

## 2023-02-06 RX ADMIN — OXYCODONE HYDROCHLORIDE AND ACETAMINOPHEN 1 TABLET: 5; 325 TABLET ORAL at 22:44

## 2023-02-06 RX ADMIN — CLONAZEPAM 0.5 MG: 0.5 TABLET ORAL at 22:44

## 2023-02-06 RX ADMIN — CARBOXYMETHYLCELLULOSE SODIUM 1 DROP: 10 GEL OPHTHALMIC at 12:17

## 2023-02-06 RX ADMIN — Medication 600 MG: at 08:14

## 2023-02-06 RX ADMIN — PANTOPRAZOLE SODIUM 40 MG: 40 TABLET, DELAYED RELEASE ORAL at 23:53

## 2023-02-06 RX ADMIN — SODIUM CHLORIDE, PRESERVATIVE FREE 10 ML: 5 INJECTION INTRAVENOUS at 08:18

## 2023-02-06 RX ADMIN — Medication 1 CAPSULE: at 22:31

## 2023-02-06 RX ADMIN — AMITRIPTYLINE HYDROCHLORIDE 25 MG: 25 TABLET, FILM COATED ORAL at 08:14

## 2023-02-06 RX ADMIN — BUSPIRONE HYDROCHLORIDE 10 MG: 10 TABLET ORAL at 08:14

## 2023-02-06 RX ADMIN — OXYCODONE HYDROCHLORIDE AND ACETAMINOPHEN 1 TABLET: 5; 325 TABLET ORAL at 06:02

## 2023-02-06 RX ADMIN — PANTOPRAZOLE SODIUM 40 MG: 40 TABLET, DELAYED RELEASE ORAL at 08:18

## 2023-02-06 RX ADMIN — TROSPIUM CHLORIDE 20 MG: 20 TABLET, FILM COATED ORAL at 08:20

## 2023-02-06 RX ADMIN — OXYCODONE HYDROCHLORIDE AND ACETAMINOPHEN 1 TABLET: 5; 325 TABLET ORAL at 12:32

## 2023-02-06 RX ADMIN — CLONAZEPAM 0.5 MG: 0.5 TABLET ORAL at 12:17

## 2023-02-06 RX ADMIN — MEROPENEM 1000 MG: 1 INJECTION, POWDER, FOR SOLUTION INTRAVENOUS at 22:40

## 2023-02-06 RX ADMIN — Medication 2000 UNITS: at 08:20

## 2023-02-06 RX ADMIN — FERROUS SULFATE TAB EC 325 MG (65 MG FE EQUIVALENT) 325 MG: 325 (65 FE) TABLET DELAYED RESPONSE at 08:15

## 2023-02-06 RX ADMIN — HEPARIN SODIUM 5000 UNITS: 5000 INJECTION INTRAVENOUS; SUBCUTANEOUS at 17:04

## 2023-02-06 RX ADMIN — PHENAZOPYRIDINE HYDROCHLORIDE 200 MG: 100 TABLET ORAL at 12:16

## 2023-02-06 RX ADMIN — TAMSULOSIN HYDROCHLORIDE 0.4 MG: 0.4 CAPSULE ORAL at 12:16

## 2023-02-06 RX ADMIN — MEROPENEM 1000 MG: 1 INJECTION, POWDER, FOR SOLUTION INTRAVENOUS at 12:23

## 2023-02-06 RX ADMIN — SODIUM CHLORIDE, PRESERVATIVE FREE 10 ML: 5 INJECTION INTRAVENOUS at 22:36

## 2023-02-06 RX ADMIN — CARBOXYMETHYLCELLULOSE SODIUM 1 DROP: 10 GEL OPHTHALMIC at 22:39

## 2023-02-06 RX ADMIN — HEPARIN SODIUM 5000 UNITS: 5000 INJECTION INTRAVENOUS; SUBCUTANEOUS at 22:31

## 2023-02-06 RX ADMIN — Medication 1 CAPSULE: at 08:15

## 2023-02-06 RX ADMIN — POLYETHYLENE GLYCOL 3350 17 G: 17 POWDER, FOR SOLUTION ORAL at 12:16

## 2023-02-06 RX ADMIN — GUAIFENESIN 600 MG: 600 TABLET, EXTENDED RELEASE ORAL at 08:15

## 2023-02-06 RX ADMIN — HEPARIN SODIUM 5000 UNITS: 5000 INJECTION INTRAVENOUS; SUBCUTANEOUS at 05:59

## 2023-02-06 RX ADMIN — KETOROLAC TROMETHAMINE 15 MG: 15 INJECTION, SOLUTION INTRAMUSCULAR; INTRAVENOUS at 17:12

## 2023-02-06 RX ADMIN — GABAPENTIN 300 MG: 300 CAPSULE ORAL at 17:04

## 2023-02-06 RX ADMIN — TRAZODONE HYDROCHLORIDE 100 MG: 100 TABLET ORAL at 22:30

## 2023-02-06 RX ADMIN — CARBOXYMETHYLCELLULOSE SODIUM 1 DROP: 10 GEL OPHTHALMIC at 05:59

## 2023-02-06 RX ADMIN — AMITRIPTYLINE HYDROCHLORIDE 25 MG: 25 TABLET, FILM COATED ORAL at 17:04

## 2023-02-06 RX ADMIN — MEROPENEM 1000 MG: 1 INJECTION, POWDER, FOR SOLUTION INTRAVENOUS at 04:03

## 2023-02-06 RX ADMIN — BUSPIRONE HYDROCHLORIDE 10 MG: 10 TABLET ORAL at 22:30

## 2023-02-06 RX ADMIN — AMITRIPTYLINE HYDROCHLORIDE 25 MG: 25 TABLET, FILM COATED ORAL at 22:30

## 2023-02-06 RX ADMIN — PHENAZOPYRIDINE HYDROCHLORIDE 200 MG: 100 TABLET ORAL at 17:04

## 2023-02-06 RX ADMIN — CARBOXYMETHYLCELLULOSE SODIUM 1 DROP: 10 GEL OPHTHALMIC at 17:04

## 2023-02-06 RX ADMIN — GABAPENTIN 300 MG: 300 CAPSULE ORAL at 22:30

## 2023-02-06 RX ADMIN — ESCITALOPRAM OXALATE 20 MG: 10 TABLET ORAL at 08:14

## 2023-02-06 RX ADMIN — KETOROLAC TROMETHAMINE 15 MG: 15 INJECTION, SOLUTION INTRAMUSCULAR; INTRAVENOUS at 22:44

## 2023-02-06 ASSESSMENT — PAIN DESCRIPTION - ORIENTATION
ORIENTATION: LEFT;RIGHT
ORIENTATION: RIGHT;LEFT;MID
ORIENTATION: RIGHT;LEFT
ORIENTATION: RIGHT;LEFT
ORIENTATION: LEFT;RIGHT
ORIENTATION: LEFT;RIGHT

## 2023-02-06 ASSESSMENT — PAIN SCALES - GENERAL
PAINLEVEL_OUTOF10: 8
PAINLEVEL_OUTOF10: 7
PAINLEVEL_OUTOF10: 6
PAINLEVEL_OUTOF10: 7
PAINLEVEL_OUTOF10: 7

## 2023-02-06 ASSESSMENT — PAIN DESCRIPTION - DESCRIPTORS
DESCRIPTORS: ACHING;DULL;NAGGING
DESCRIPTORS: ACHING
DESCRIPTORS: ACHING
DESCRIPTORS: ACHING;DISCOMFORT
DESCRIPTORS: ACHING

## 2023-02-06 ASSESSMENT — PAIN DESCRIPTION - LOCATION
LOCATION: ABDOMEN;KNEE
LOCATION: ABDOMEN;LEG
LOCATION: ABDOMEN;LEG
LOCATION: ABDOMEN;KNEE;LEG
LOCATION: ABDOMEN;KNEE

## 2023-02-06 NOTE — CARE COORDINATION
Transitional planning: Phone call to [facility] to confirm patient may return there when discharged.  Admissions confirmed that patient can return upon discharge from hospital.

## 2023-02-06 NOTE — PROGRESS NOTES
[facility]  Internal Medicine Teaching Residency Program  Inpatient Daily Progress Note  ______________________________________________________________________________    Patient: Leigh Ann Enriquez  YOB: 1945   OYS:[id number]    Acct: [de-identified]     Room: [address] [place] [address]  Admit date: 2/3/2023  Today's date: 02/06/23  Number of days in the hospital: 3    SUBJECTIVE   Admitting Diagnosis: <principal problem not specified>  CC: UTI  Pt examined at bedside. Chart & results reviewed.     -naeon  -vss on 2 l nc  -afebrile last 24h  -Ucx growing Aerococcus urinae-sensitivities pending-ID on-board-currently on meropenem  -states she feels \"lousy\" -when asked why states she has UTI  -tolerating PO-has been pushing PO fluids-writer encouraged this    ROS:  Constitutional:  negative for chills, fevers, sweats  Respiratory:  negative for cough, dyspnea on exertion, hemoptysis, shortness of breath, wheezing  Cardiovascular:  negative for chest pain, chest pressure/discomfort, lower extremity edema, palpitations  Gastrointestinal:  negative for abdominal pain, constipation, diarrhea, nausea, vomiting  Neurological:  negative for dizziness, headache    BRIEF HISTORY     The patient is a pleasant 68 y.o. female with a past medical history significant for recurrent UTIs, recent pneumonia and recent COVID diagnosis. Patient also has a past medical history of CKD stage IIIa with solitary kidney, paroxysmal A. fib, COPD, falls, myelopathy with persistent lower extremity weakness, chronic abdominal pain. presented from nursing home after she was found altered and had slurred speech and some weakness. Stroke alert were announced upon arrival of the patient because of the neurological deficit CT head and CTA were unremarkable.  Patient reported that every time she has the same symptoms he is found to have UTI that is why he was sent to the hospital.   In the ED she was found to have UTI and culture sent. OBJECTIVE     Vital Signs:  BP 92/61   Pulse 64   Temp 98.2 °F (36.8 °C) (Oral)   Resp 16   Ht 5' 2\" (1.575 m)   Wt 181 lb (82.1 kg)   SpO2 96%   BMI 33.11 kg/m²     Temp (24hrs), Av.6 °F (37 °C), Min:98 °F (36.7 °C), Max:99.2 °F (37.3 °C)    In: 1782.5   Out: 2400 [Urine:2400]    Body Mass Index : Body mass index is 33.11 kg/m². PHYSICAL EXAMINATION:  Constitutional: This is a well developed, well nourished, 30-34.9 - Obesity Grade I 68y.o. year old female who is alert, oriented, cooperative and in no apparent distress. Head:normocephalic and atraumatic. EENT:  PERRLA. No conjunctival injections. Septum was midline, mucosa was without erythema, exudates or cobblestoning. No thrush was noted. Neck: Supple without thyromegaly. No elevated JVP. Trachea was midline. Respiratory: Chest was symmetrical without dullness to percussion. Breath sounds bilaterally were clear to auscultation. There were no wheezes, rhonchi or rales. There is no intercostal retraction or use of accessory muscles. No egophony noted. Cardiovascular: Regular without murmur, clicks, gallops or rubs. Abdomen: Generalized abdominal tenderness. slightly rounded and soft without organomegaly. No rebound, rigidity or guarding was appreciated. Lymphatic: No lymphadenopathy. Musculoskeletal: Normal curvature of the spine. No gross muscle weakness. Extremities:  No lower extremity edema, ulcerations, tenderness, varicosities or erythema. Muscle size, tone and strength are normal.  No involuntary movements are noted. Skin:  Warm and dry. Good color, turgor and pigmentation. No lesions or scars.   No cyanosis or clubbing  Neurological/Psychiatric: The patient's general behavior, level of consciousness, thought content and emotional status is normal.        Medications:  Scheduled Medications:    amitriptyline  25 mg Oral TID    busPIRone  10 mg Oral BID    carboxymethylcellulose PF  1 drop Both Eyes Q6H    Vitamin D  2,000 Units Oral Daily    escitalopram  20 mg Oral Daily    ferrous sulfate  325 mg Oral Daily with breakfast    trospium  20 mg Oral BID AC    [Held by provider] fluticasone  1 spray Each Nostril Daily    gabapentin  300 mg Oral TID    guaiFENesin  600 mg Oral Daily    lactobacillus   Oral BID    [Held by provider] cetirizine  5 mg Oral Daily    magnesium hydroxide  30 mL Oral Nightly    metoprolol succinate  25 mg Oral Daily    pantoprazole  40 mg Oral BID    QUEtiapine  100 mg Oral Nightly    atorvastatin  40 mg Oral Daily    traZODone  100 mg Oral Nightly    sodium chloride flush  5-40 mL IntraVENous 2 times per day    heparin (porcine)  5,000 Units SubCUTAneous 3 times per day    meropenem  1,000 mg IntraVENous Q8H    calcium carbonate  600 mg Oral BID     Continuous Infusions:    sodium chloride       PRN Medicationsbenzonatate, 100 mg, TID PRN  clonazePAM, 0.5 mg, TID PRN  albuterol, 2.5 mg, Q6H PRN  aluminum & magnesium hydroxide-simethicone, 5 mL, PRN  oxyCODONE-acetaminophen, 1 tablet, Q6H PRN  polyethylene glycol, 17 g, Daily PRN  sodium chloride flush, 5-40 mL, PRN  sodium chloride, , PRN  ondansetron, 4 mg, Q8H PRN   Or  ondansetron, 4 mg, Q6H PRN  acetaminophen, 650 mg, Q6H PRN   Or  acetaminophen, 650 mg, Q6H PRN    Diagnostic Labs:  CBC:   Recent Labs     02/04/23  0352 02/05/23  0257 02/06/23  0430   WBC 5.1 3.1* 2.7*   RBC 4.17 4.08 3.92*   HGB 12.6 12.1 11.8*   HCT 38.5 38.5 38.8   MCV 92.3 94.4 99.0   RDW 14.7* 14.8* 14.7*   PLT See Reflexed IPF Result See Reflexed IPF Result 127*     BMP:   Recent Labs     02/04/23  0352 02/05/23  0257 02/06/23  0430    134* 136   K 3.8 4.3 4.2    102 104   CO2 22 25 24   BUN 13 10 10   CREATININE 1.08* 0.88 0.84     BNP: No results for input(s): BNP in the last 72 hours.   PT/INR:   Recent Labs     02/03/23  1245   PROTIME 11.3   INR 1.1     APTT:   Recent Labs     02/03/23  1245   APTT 26.6     CARDIAC ENZYMES: No results for input(s): CKMB, CKMBINDEX, TROPONINI in the last 72 hours. Invalid input(s): CKTOTAL;3  FASTING LIPID PANEL:  Lab Results   Component Value Date    CHOL 203 (H) 12/14/2020    HDL 43 12/14/2020    TRIG 268 (H) 12/14/2020     LIVER PROFILE: No results for input(s): AST, ALT, ALB, BILIDIR, BILITOT, ALKPHOS in the last 72 hours. MICROBIOLOGY:   Lab Results   Component Value Date/Time    CULTURE (A) 02/03/2023 01:26 PM     AEROCOCCUS URINAE >718728 CFU/ML There are no CLSI interpretive guidelines for routine susceptibility testing. Aerococcus species are reported to be susceptible to penicillin. A. urinae has also been described as susceptible to amoxicillin and nitrofurantoin (for treatment of urinary tract infections only). Imaging:    CT HEAD WO CONTRAST    Result Date: 2/3/2023  No acute intracranial abnormality. Mild parenchymal volume loss. Mild chronic microvascular disease. Results were reported to Dr. Jonathan Canchola by radiology results communication at 1:08 p.m. on February 3, 2023. CT ABDOMEN PELVIS W IV CONTRAST Additional Contrast? None    Result Date: 2/3/2023  There is significant wall thickening noted within the ascending colon which was not present on prior examination. Finding may be related to colitis. Large arge axial hiatal hernia. status post right nephrectomy, cholecystectomy and hysterectomy. Fatty liver. Extensive stool throughout the transverse colon. XR CHEST PORTABLE    Result Date: 2/3/2023  Mildly improved aeration at the left base with mild residual atelectasis/infiltrate. CTA HEAD NECK W CONTRAST    Result Date: 2/3/2023  50% stenosis in the proximal left internal carotid artery. Mild stenosis in the proximal right internal carotid artery. Otherwise, no acute abnormality or flow-limiting stenosis in the remainder of the major arteries of the head and neck.        ASSESSMENT & PLAN     This is a 68 y.o. female who presented with altered mental status and found to have UTI. Principal Problem:  #1 Complicated UTI (urinary tract infection)  -History of ESBL and MDRO  -ID consulted. Appreciate recommendations  -ucx growing Aerococcus urinae-sensitivities pending-f/u sensitivities & ID recs  -Continue Meropenem for now     #2 Acute Metabolic encephalopathy secondary to UTI  -Resolved     #3 Paroxysmal A. Fib  -Rate controlled with metoprolol  -Sinus rhythm on EKG with occasional PVCs  -[id number] score 4, not on any anticoagulation 2/2 h/o GIB-no compelling indication for imminent need for [facility] (e.g. no prior h/o TIA/stroke)     #4 Diastolic heart failure with preserved ejection fraction  - Last echo on 12/15/2020 showed Left ventricle is normal in size. Global left ventricular systolic function  is normal. EF 56 %. Mild left ventricular hypertrophy. Grade I (mild) left ventricular diastolic dysfunction. #5 CKD stage IIIa with solitary kidney  -Baseline creatinine looks like around 1  -Monitor VERONICA's  -Avoid nephrotoxic medications     #6 Agoraphobia with anxiety and depression  -Resume home medications     #7 Intractable nausea with acute on chronic abdominal pain with hiatal hernia/chronic GERD status post prior Nissen. - Continue PPI, GI prophylaxis continue antiemetics and other supportive care     #8 Colitis  - Conservative management     #9 Chronic paraplegia   - History of critical illness myelopathy status post prior complicated hospitalization with trach and PEG in remote past with decannulation of trach, removal of PEG without further issues, does use wheelchair for mobilization.    - Continue fall precautions     DVT ppx: Heparin SQ  GI ppx: Protonix     PT/OT: Consulted  Discharge Planning: Home pending antibiotic adjustment based on culture results    Ruben Bergman MD  Internal Medicine Resident, PGY-3  [address], [place]  2/6/2023, 7:22 AM

## 2023-02-06 NOTE — PROGRESS NOTES
Physician Progress Note      PATIENTJoão Gallagher  CSN #:                  [id number]  :                       1945  ADMIT DATE:       2/3/2023 12:30 PM  DISCH DATE:  RESPONDING  PROVIDER #:        STEPHANY Hung TEXT:    Pt admitted with UTI and AMS. Pt noted to have fever, elevated lactic acid and   CRP with AMS. If possible, please document in the progress notes and   discharge summary if you are evaluating and /or treating any of the following: The medical record reflects the following:  Risk Factors: recent Covid exposure, current UTI and recent PNA, resides in   ECF. 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 Temp 38.9 Lactic acid 2.6 CRP 29.4 Procalcitonin 0.09 CXR showing   mild residual atelectasis/infiltrate. Metabolic encephalopathy  UTI  Treatment: IVFB x500 cc, Tylenol, IV Merrem, labs/monitoring    Thank-you,  Feli Moore RN, [email or web address]  Options provided:  -- Sepsis, present on admission, now resolved  -- Sepsis, present on admission  -- UTI without Sepsis  -- Other - I will add my own diagnosis  -- Disagree - Not applicable / Not valid  -- Disagree - Clinically unable to determine / Unknown  -- Refer to Clinical Documentation Reviewer    PROVIDER RESPONSE TEXT:    This patient has a UTI without Sepsis. Query created by: Celso Garrido on 2023 12:10 PM      Electronically signed by:   STEPHANY NORIEGA 2023 9:44 PM

## 2023-02-06 NOTE — PLAN OF CARE
Problem: Discharge Planning  Goal: Discharge to home or other facility with appropriate resources  2/5/2023 2040 by Dee Dee [place] RN  Outcome: Progressing  2/5/2023 1744 by Crys Mora RN  Outcome: Progressing     Problem: Safety - Adult  Goal: Free from fall injury  2/5/2023 2040 by Dee Dee [place] RN  Outcome: Progressing  2/5/2023 1744 by Crys Mora RN  Outcome: Progressing     Problem: ABCDS Injury Assessment  Goal: Absence of physical injury  2/5/2023 2040 by Dee Dee [place] RN  Outcome: Progressing  2/5/2023 1744 by Crys Mora RN  Outcome: Progressing     Problem: Skin/Tissue Integrity  Goal: Absence of new skin breakdown  Description: 1. Monitor for areas of redness and/or skin breakdown  2. Assess vascular access sites hourly  3. Every 4-6 hours minimum:  Change oxygen saturation probe site  4. Every 4-6 hours:  If on nasal continuous positive airway pressure, respiratory therapy assess nares and determine need for appliance change or resting period.   2/5/2023 2040 by Dee Dee [place] RN  Outcome: Progressing  2/5/2023 1744 by Crys Mora RN  Outcome: Progressing     Problem: Neurosensory - Adult  Goal: Achieves stable or improved neurological status  2/5/2023 2040 by Dee Dee [place] RN  Outcome: Progressing  2/5/2023 1744 by Crys Mora RN  Outcome: Progressing     Problem: Cardiovascular - Adult  Goal: Maintains optimal cardiac output and hemodynamic stability  2/5/2023 2040 by Dee Dee [place] RN  Outcome: Progressing  2/5/2023 1744 by Crys Mora RN  Outcome: Progressing  Goal: Absence of cardiac dysrhythmias or at baseline  2/5/2023 2040 by Dee Dee [place] RN  Outcome: Progressing  2/5/2023 1744 by Crys Mora RN  Outcome: Progressing     Problem: Skin/Tissue Integrity - Adult  Goal: Skin integrity remains intact  2/5/2023 2040 by Dee Dee [place] RN  Outcome: Progressing  2/5/2023 1744 by Crys Mora RN  Outcome: Progressing     Problem: Musculoskeletal - Adult  Goal: Return mobility to safest level of function  2/5/2023 2040 by Carlene Polanco RN  Outcome: Progressing  2/5/2023 1744 by Chelsea Hough RN  Outcome: Progressing  Goal: Maintain proper alignment of affected body part  2/5/2023 2040 by Carlene Polanco RN  Outcome: Progressing  2/5/2023 1744 by Chelsea Hough RN  Outcome: Progressing  Goal: Return ADL status to a safe level of function  2/5/2023 2040 by Carlene Polanco RN  Outcome: Progressing  2/5/2023 1744 by Chelsea Hough RN  Outcome: Progressing     Problem: Gastrointestinal - Adult  Goal: Maintains or returns to baseline bowel function  2/5/2023 2040 by Carlene Polanco RN  Outcome: Progressing  2/5/2023 1744 by Chelsea Hough RN  Outcome: Progressing     Problem: Genitourinary - Adult  Goal: Absence of urinary retention  2/5/2023 2040 by Carlene Polanco RN  Outcome: Progressing  2/5/2023 1744 by Chelsea Hough RN  Outcome: Progressing     Problem: Infection - Adult  Goal: Absence of infection at discharge  2/5/2023 2040 by Carlene Polanco RN  Outcome: Progressing  2/5/2023 1744 by Chelsea Hough RN  Outcome: Progressing  Goal: Absence of infection during hospitalization  2/5/2023 2040 by Carlene Polanco RN  Outcome: Progressing  2/5/2023 1744 by Chelsea Hough RN  Outcome: Progressing     Problem: Pain  Goal: Verbalizes/displays adequate comfort level or baseline comfort level  2/5/2023 2040 by Carlene Polanco RN  Outcome: Progressing  2/5/2023 1744 by Chelsea Hough RN  Outcome: Progressing     Problem: Nutrition Deficit:  Goal: Optimize nutritional status  2/5/2023 2040 by Carlene Polanco RN  Outcome: Progressing  2/5/2023 1744 by Chelsea Hough RN  Outcome: Progressing

## 2023-02-06 NOTE — PLAN OF CARE
Problem: Discharge Planning  Goal: Discharge to home or other facility with appropriate resources  2/6/2023 0617 by Aldo Forrester RN  Outcome: Progressing  2/5/2023 2040 by Elizabeth Baxter RN  Outcome: Progressing  2/5/2023 1744 by Anand Lima RN  Outcome: Progressing     Problem: Safety - Adult  Goal: Free from fall injury  2/6/2023 0617 by Aldo Forrester RN  Outcome: Progressing  2/5/2023 2040 by Elizabeth Baxter RN  Outcome: Progressing  2/5/2023 1744 by Anand Lima RN  Outcome: Progressing     Problem: ABCDS Injury Assessment  Goal: Absence of physical injury  2/6/2023 0617 by Aldo Forrester RN  Outcome: Progressing  2/5/2023 2040 by Elizabeth Baxter RN  Outcome: Progressing  2/5/2023 1744 by Anand Lima RN  Outcome: Progressing     Problem: Skin/Tissue Integrity  Goal: Absence of new skin breakdown  Description: 1. Monitor for areas of redness and/or skin breakdown  2. Assess vascular access sites hourly  3. Every 4-6 hours minimum:  Change oxygen saturation probe site  4. Every 4-6 hours:  If on nasal continuous positive airway pressure, respiratory therapy assess nares and determine need for appliance change or resting period.   2/6/2023 0617 by Aldo Forrester RN  Outcome: Progressing  2/5/2023 2040 by Elizabeth Baxter RN  Outcome: Progressing  2/5/2023 1744 by Anand Lima RN  Outcome: Progressing     Problem: Neurosensory - Adult  Goal: Achieves stable or improved neurological status  2/6/2023 0617 by Aldo Forrester RN  Outcome: Progressing  2/5/2023 2040 by Elizabeth Baxter RN  Outcome: Progressing  2/5/2023 1744 by Anand Lima RN  Outcome: Progressing     Problem: Cardiovascular - Adult  Goal: Maintains optimal cardiac output and hemodynamic stability  2/6/2023 0617 by Aldo Forrester RN  Outcome: Progressing  2/5/2023 2040 by Elizabeth Baxter RN  Outcome: Progressing  2/5/2023 1744 by Anand Lima RN  Outcome: Progressing  Goal: Absence of cardiac dysrhythmias or at baseline  2/6/2023 0617 by Rubia Amaya Marizol Child  Outcome: Progressing  2/5/2023 2040 by Ashok Coley RN  Outcome: Progressing  2/5/2023 1744 by Apple Muir RN  Outcome: Progressing     Problem: Skin/Tissue Integrity - Adult  Goal: Skin integrity remains intact  2/6/2023 0617 by Nahomi Green RN  Outcome: Progressing  2/5/2023 2040 by Ashok Coley RN  Outcome: Progressing  2/5/2023 1744 by Apple Muir RN  Outcome: Progressing     Problem: Musculoskeletal - Adult  Goal: Return mobility to safest level of function  2/6/2023 0617 by Nahomi Green RN  Outcome: Progressing  2/5/2023 2040 by Ashok Coley RN  Outcome: Progressing  2/5/2023 1744 by Apple Muir RN  Outcome: Progressing  Goal: Maintain proper alignment of affected body part  2/6/2023 0617 by Nahomi Green RN  Outcome: Progressing  2/5/2023 2040 by Ashok Coley RN  Outcome: Progressing  2/5/2023 1744 by Apple Muir RN  Outcome: Progressing  Goal: Return ADL status to a safe level of function  2/6/2023 0617 by Nahomi Green RN  Outcome: Progressing  2/5/2023 2040 by Ashok Coley RN  Outcome: Progressing  2/5/2023 1744 by Apple Muir RN  Outcome: Progressing     Problem: Gastrointestinal - Adult  Goal: Maintains or returns to baseline bowel function  2/6/2023 0617 by Nahomi Green RN  Outcome: Progressing  2/5/2023 2040 by Ashok Coley RN  Outcome: Progressing  2/5/2023 1744 by Apple Muir RN  Outcome: Progressing     Problem: Genitourinary - Adult  Goal: Absence of urinary retention  2/6/2023 0617 by Nahomi Green RN  Outcome: Progressing  2/5/2023 2040 by Ashok Coley RN  Outcome: Progressing  2/5/2023 1744 by Apple Muir RN  Outcome: Progressing     Problem: Infection - Adult  Goal: Absence of infection at discharge  2/6/2023 0617 by Nahomi Green RN  Outcome: Progressing  2/5/2023 2040 by Ashok Coley RN  Outcome: Progressing  2/5/2023 1744 by Apple Muir RN  Outcome: Progressing  Goal: Absence of infection during hospitalization  2/6/2023 0617 by Nahomi Green RN  Outcome: Progressing  2/5/2023 2040 by Gilberto Hendrickson RN  Outcome: Progressing  2/5/2023 1744 by Elena Golden RN  Outcome: Progressing     Problem: Pain  Goal: Verbalizes/displays adequate comfort level or baseline comfort level  2/6/2023 0617 by Alyson Rodrigues RN  Outcome: Progressing  2/5/2023 2040 by Gilberto Hendrickson RN  Outcome: Progressing  2/5/2023 1744 by Elena Golden RN  Outcome: Progressing     Problem: Nutrition Deficit:  Goal: Optimize nutritional status  2/6/2023 0617 by Alyson Rodrigues RN  Outcome: Progressing  2/5/2023 2040 by Gilberto Hendrickson RN  Outcome: Progressing  2/5/2023 1744 by Elena Golden RN  Outcome: Progressing

## 2023-02-06 NOTE — PROGRESS NOTES
[facility] - Progress Note    Today's Date and Time: 2/6/2023, 8:55 AM    Impression :   Altered  mental status  Prior COVID 19 Confirmed Infection 1/1/23  Vaccination status:  Patient received 3 doses of Leotis Real in 2021. Overdue for Covid booster since 2/19/22. Recent infection will count as a 4th dose of vaccine  She will still need a Bivalent booster in April 2023  Acute hypoxic respiratory distress  Elevated CRP  Paroxysmal Afib  HTN  GERD  COPD  Solitary left kidney  Chronic abdominal pain  Paraplegia  Wheel-chair bound  Chronic gastritis  Anxiety  Hx MDRO and ESBL  PCN allergy    Recommendations:   Continue Meropenem 1 gm IV q 6 hr for Aerococcus in the urine culture  Patient is allergic to Ampicillin     Medical Decision Making/Summary/Discussion:2/6/2023       Infection Control Recommendations   [place] Precautions  Contact Isolation  ESBL, MDRO  Antimicrobial Stewardship Recommendations     Simplification of therapy  Targeted therapy    Coordination of Outpatient Care:   Estimated Length of IV antimicrobials: TBD  Patient will need Midline Catheter Insertion: TBD  Patient will need PICC line Insertion:TBD   Patient will need: Home IV , [place],  SNF,  LTAC: TBD  Patient will need outpatient wound care:No    Chief complaint/reason for consultation:   Altered mentation  Suspected complicated UTI      History of Present Illness:   Sonal Agarwal is a 68y.o.-year-old  female who was initially admitted on 2/3/2023. Patient seen at the request of Jo-Ann John. INITIAL HISTORY:    Patient known to our service from prior admission to [facility] on 1-1-23 when she was treated for a fall and for Covid 19. She received Paxlovid.      She has a complicated past medical Hx, including:  Paroxysmal Afib  Essential HTN  GERD  COPD  Solitary left kidney  Chronic abdominal pain  Paraplegia  Wheel-chair bound  Chronic gastritis  Anxiety  Hx MDRO and ESBL with prior ESBL + UTIs  PCN allergy    On current admission 2-3-23 patient was transferred from SNF with slurred speech and altered mentation. She underwent stroke alert evaluation which was negative. Patient in the past has shown altered mentation when a UTI affects her. Cultures taken and patient started on Meropenem. CURRENT EVALUATION : 2/6/2023    BP (!) 100/55   Pulse 64   Temp 98 °F (36.7 °C) (Oral)   Resp 17   Ht 5' 2\" (1.575 m)   Wt 181 lb (82.1 kg)   SpO2 96%   BMI 33.11 kg/m²      Afebrile  VS stable, hypotensive    Patient stable- no overnight events  Patient reports dysuria, diaphoresis, and chills during the day. She is constipated. She denies headaches, dyspnea, chest pain. No other complaints  No new issues per RN    Medications reviewed  On meropenem  Urine culture with Aerococcus  Allergic to penicillin, ampicillin    CT abdomen 2-3-23 with retained stool and mild thickening of wall of ascending colon. Solitary kidney. Labs, X rays reviewed: 2/6/2023    BUN: 13-->10 --> 10  Cr: 1.08-->0.88 --> 0.84    WBC: 5.1-->3.1 --> 2.7  Hb: 12.6 -->12.1 --> 11.8  Plat: 130-->147 --> 127    Cultures:  Urine:  2-3-23: AEROCOCCUS URINAE >400343 CFU/ML  Blood:  2-3-23: No growth x 2  Sputum :  1-1-23: Normal teresa  Wound:      Discussed with patient, RN, Im.    I have personally reviewed the past medical history, past surgical history, medications, social history, and family history, and I have updated the database accordingly.   Past Medical History:     Past Medical History:   Diagnosis Date    A-fib ([facility] [address]     Acquired absence of kidney     Adult hypertrophic pyloric stenosis     Agoraphobia with panic disorder     Agoraphobia without history of panic disorder     Allergic rhinitis     Anemia, unspecified     Anxiety     Anxiety disorder     Arthritis     Atrial fibrillation (HCC)     Back pain     Bronchitis     Caffeine use     8 coffee / day    Cardiomegaly     Carpal tunnel syndrome Cataracts, bilateral     Centriacinar emphysema (HCC)     Chronic kidney disease, stage III (moderate) (HCC)     Chronic pain     Constipation     Constipation     COPD (chronic obstructive pulmonary disease) (HCC)     Emphysema    Cystitis with hematuria     Depression     Diaphragmatic hernia without obstruction or gangrene     Diarrhea     Difficulty walking     Dry eye syndrome of unspecified lacrimal gland     Esophageal obstruction     FOM (frequency of micturition)     Foot drop, right foot     Gastro-esophageal reflux disease with esophagitis, with bleeding     GERD (gastroesophageal reflux disease)     H/O gastric ulcer     HTN (hypertension)     Hyperlipidemia     Hypertension, essential     Klebsiella pneumoniae (k. pneumoniae) as the cause of diseases classified elsewhere     Major depression, recurrent (HCC)     Mitral valve prolapse     Mixed hyperlipidemia     Mumps     Muscle weakness (generalized)     MVP (mitral valve prolapse)     Dr. Shabbir Mahmood in May 2019    Old myocardial infarction     Personal history of disease of digestive system     Psychophysiological insomnia     Recurrent UTI     Dr. Dewey Anthony    S/P PICC central line placement 08/05/2022    \"no longer present\" per Elicia Sevilla (Nurse) at [facility] [place]. Sepsis ([address]     Sinusitis     Tracheostomy in place [facility])     \"No longer present\" per Elicia Sevilla (Nurse) at [facility] of [place].     Ulcerative esophagitis     Urinary tract infection, site not specified     Uses wheelchair     Wears glasses     Wellness examination     Dr. Jayden Villegas seen in Jan 2020       Past Surgical  History:     Past Surgical History:   Procedure Laterality Date    [facility], LAPAROSCOPIC N/A 05/22/2020    XI LAPAROSCOPIC ROBOTIC CHOLECYSTECTOMY, PYLOROPLASTY performed by Heron Barahona MD at [place] [address] N/A 1/28/2022    COLONOSCOPY POLYPECTOMY HOT performed by Nilay Shaw MD at [facility]      ERCP  05/21/2020    with stent insertion, balloon dilation, sphinctereotomy    ERCP  05/21/2020    ** CASE IN OR WITY GI STAFF** ERCP STENT INSERTION performed by Jossie Guerra MD at [facility]    ERCP  05/21/2020    ** CASE IN OR WITH GI STAFF**ERCP SPHINCTER/PAPILLOTOMY performed by Jossie Guerra MD at [facility]    ERCP  05/21/2020    ** CASE IN OR WITH GI STAFF**ERCP DILATION BALLOON performed by Jossie Guerra MD at [facility]    ERCP N/A 2/8/2021    ERCP STENT REMOVAL performed by Carmel Wooten MD at [facility]    ERCP  2/8/2021    ERCP STONE REMOVAL performed by Carmel Wooten MD at [facility]    GASTRIC FUNDOPLICATION N/A 50/21/6967    XI LAPAROSCOPIC ROBOTIC [facility], CHERYL FUNDOPLICATION performed by Sandra Tejada MD at [address] 03/27/2020    EGD PEG TUBE PLACEMENT performed by Sandra Tejada MD at [address] N/A 2/8/2021    **CASE IN O.R. W/ GI STAFF - EGD WITH REMOVAL PEG TUBE performed by Carmel Wooten MD at [facility]    HAND SURGERY      [facility] CATH POWER PICC TRIPLE  03/04/2020         HERNIA REPAIR      Hiatal hernia    HYSTERECTOMY (CERVIX STATUS UNKNOWN)      Abdominal    IR NONTUNNELED VASCULAR CATHETER  5/10/2022    IR NONTUNNELED VASCULAR CATHETER 5/10/2022 Shari Gutierrez MD [facility] SPECIAL PROCEDURES    JOINT REPLACEMENT Right     right hip    OVARY REMOVAL      RHINOPLASTY      TONSILLECTOMY      TOTAL HIP ARTHROPLASTY      TOTAL NEPHRECTOMY      atrophic from infections, age 13    TRACHEOSTOMY N/A 03/27/2020    **ADD ON **WANTS 10:00AM **TRACHEOTOMY performed by Sandra Tejada MD at [facility] N/A 04/02/2020    TRACHEOTOMY EXCHANGE performed by Sandra Tejada MD at [facility] 03/17/2020    BEDSIDE EGD ESOPHAGOGASTRODUODENOSCOPY (ICU) performed by Reston Norris Rajwinder Maria MD at [facility]    UPPER GASTROINTESTINAL ENDOSCOPY N/A 05/18/2020    EGD BIOPSY performed by Yolande Trujillo MD at [place] [address]  05/18/2020    EGD DILATION BALLOON performed by Yolande Trujillo MD at [place] [address] N/A 07/06/2020    ** CASE IN O.R. WITH GI STAFF** EGD ESOPHAGOGASTRODUODENOSCOPY performed by Elda Thorne MD at [place] [address] 11/09/2020    EGD DIAGNOSTIC ONLY performed by Franklin Wagner MD at [place] [address]  02/08/2021    - EGD WITH REMOVAL PEG TUBE,ERCP STENT REMOVAL,ERCP STONE REMOVAL    UPPER GASTROINTESTINAL ENDOSCOPY N/A 11/24/2021    EGD BIOPSY performed by Franklin Wagner MD at [place] [address] N/A 9/8/2022    EGD BIOPSY performed by Yolande Trujillo MD at [address] [place] [address] N/A 10/11/2022    EGD BIOPSY performed by Yolande Trujillo MD at [address] [place] [address]       Medications:      amitriptyline  25 mg Oral TID    busPIRone  10 mg Oral BID    carboxymethylcellulose PF  1 drop Both Eyes Q6H    Vitamin D  2,000 Units Oral Daily    escitalopram  20 mg Oral Daily    ferrous sulfate  325 mg Oral Daily with breakfast    trospium  20 mg Oral BID AC    [Held by provider] fluticasone  1 spray Each Nostril Daily    gabapentin  300 mg Oral TID    guaiFENesin  600 mg Oral Daily    lactobacillus   Oral BID    [Held by provider] cetirizine  5 mg Oral Daily    magnesium hydroxide  30 mL Oral Nightly    metoprolol succinate  25 mg Oral Daily    pantoprazole  40 mg Oral BID    QUEtiapine  100 mg Oral Nightly    atorvastatin  40 mg Oral Daily    traZODone  100 mg Oral Nightly    sodium chloride flush  5-40 mL IntraVENous 2 times per day    heparin (porcine)  5,000 Units SubCUTAneous 3 times per day    meropenem  1,000 mg IntraVENous Q8H    calcium carbonate  600 mg Oral BID       Social History:     Social History     Socioeconomic History    Marital status:      Spouse name: Not on file    Number of children: 2    Years of education: Not on file    Highest education level: Not on file   Occupational History    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   Tobacco Use    Smoking status: Former     Packs/day: 1.00     Years: 50.00     Pack years: 50.00     Types: Cigarettes    Smokeless tobacco: Never    Tobacco comments:     quit 1 year ago    Vaping Use    Vaping Use: Former    Substances: Always   Substance and Sexual Activity    Alcohol use: No    Drug use: No    Sexual activity: Never   Other Topics Concern    Not on file   Social History Narrative    Not on file     Social Determinants of Health     Financial Resource Strain: Not on file   Food Insecurity: Not on file   Transportation Needs: Not on file   Physical Activity: Not on file   Stress: Not on file   Social Connections: Not on file   Intimate Partner Violence: Not on file   Housing Stability: Not on file       Family History:     Family History   Problem Relation Age of Onset    Cancer Mother         uterine    Heart Attack Mother     Heart Attack Father     Other Maternal Grandfather         foot gangrene    Other Paternal Grandmother         bleeding ulcers    Other Paternal Grandfather         lung cancer        Allergies:   Prednisone, Compazine [prochlorperazine maleate], Penicillin v potassium, and Penicillins     Review of Systems:     Unable to provide much information for ROS. Somnolent. 2/6/2023       Constitutional: No fevers or chills. No systemic complaints  Head: No headaches  Eyes: No double vision or blurry vision. No conjunctival inflammation. ENT: No sore throat or runny nose. . No hearing loss, tinnitus or vertigo. Cardiovascular: No chest pain or palpitations. No shortness of breath. No HOLLAND  Lung: No shortness of breath or cough. No sputum production  Abdomen: No nausea, vomiting, diarrhea, or abdominal pain. Sosa Balls No cramps.   Genitourinary: No increased urinary frequency, or dysuria. No hematuria. No suprapubic or CVA pain  Musculoskeletal: No muscle aches or pains. No joint effusions, swelling or deformities  Hematologic: No bleeding or bruising. Neurologic: No headache, weakness, numbness, or tingling. Integument: No rash, no ulcers. Psychiatric: No depression. Endocrine: No polyuria, no polydipsia, no polyphagia. Physical Examination :   Patient Vitals for the past 8 hrs:   BP Temp Temp src Pulse Resp SpO2   02/06/23 0816 -- -- -- -- -- 96 %   02/06/23 0735 (!) 100/55 98 °F (36.7 °C) Oral 64 17 95 %   02/06/23 0449 92/61 98.2 °F (36.8 °C) Oral 64 16 96 %     General Appearance: Somnolent, and in no apparent distress  Head:  Normocephalic, no trauma  Eyes: Pupils equal, round, reactive to light and accommodation; extraocular movements intact; sclera anicteric; conjunctivae pink. No embolic phenomena. ENT: Oropharynx clear, without erythema, exudate, or thrush. No tenderness of sinuses. Mouth/throat: mucosa pink and moist. No lesions. Dentition in good repair. Neck:Supple, without lymphadenopathy. Thyroid normal, No bruits. Pulmonary/Chest: Clear to auscultation, without wheezes, rales, or rhonchi. No dullness to percussion. Cardiovascular: Regular rate and rhythm without murmurs, rubs, or gallops. Abdomen: Soft, non tender. Bowel sounds normal. No organomegaly  All four Extremities: No cyanosis, clubbing, edema, or effusions. Neurologic: No gross sensory or motor deficits. Patient somnolent  Skin: Warm and dry with good turgor. Signs of peripheral arterial insufficiency. No ulcerations. No open wounds.     Medical Decision Making -Laboratory:   I have independently reviewed/ordered the following labs:    CBC with Differential:   Recent Labs     02/05/23 0257 02/06/23  0430   WBC 3.1* 2.7*   HGB 12.1 11.8*   HCT 38.5 38.8   PLT See Reflexed IPF Result 127*   LYMPHOPCT 21* 17*   MONOPCT 13* 13*     BMP:   Recent Labs     02/05/23 0257 02/06/23  0430   * 136   K 4.3 4.2    104   CO2 25 24   BUN 10 10   CREATININE 0.88 0.84     Hepatic Function Panel: No results for input(s): PROT, LABALBU, BILIDIR, IBILI, BILITOT, ALKPHOS, ALT, AST in the last 72 hours. No results for input(s): RPR in the last 72 hours. No results for input(s): HIV in the last 72 hours. No results for input(s): BC in the last 72 hours. Lab Results   Component Value Date/Time    MUCUS NOT REPORTED 12/18/2021 12:26 PM    RBC 3.92 02/06/2023 04:30 AM    TRICHOMONAS NOT REPORTED 12/18/2021 12:26 PM    WBC 2.7 02/06/2023 04:30 AM    YEAST NOT REPORTED 12/18/2021 12:26 PM    TURBIDITY Clear 02/03/2023 01:26 PM     Lab Results   Component Value Date/Time    CREATININE 0.84 02/06/2023 04:30 AM    GLUCOSE 94 02/06/2023 04:30 AM       Medical Decision Making-Imaging:     EXAMINATION:   ONE X-RAY VIEW OF THE CHEST       2/3/2023 1:21 pm       COMPARISON:   01/05/2023       HISTORY:   ORDERING SYSTEM PROVIDED HISTORY: sepsis   TECHNOLOGIST PROVIDED HISTORY:   sepsis       FINDINGS:   Heart size is stable. Mediastinal contours are unchanged. There is improved   aeration in the left base with mild residual atelectasis/infiltrate. Patulous esophagus noted. No new airspace consolidation. No pneumothorax or   pleural effusion. Impression   Mildly improved aeration at the left base with mild residual   atelectasis/infiltrate. EXAMINATION:   CT OF THE ABDOMEN AND PELVIS WITH CONTRAST 2/3/2023 12:42 pm       TECHNIQUE:   CT of the abdomen and pelvis was performed with the administration of   intravenous contrast. Multiplanar reformatted images are provided for review. Automated exposure control, iterative reconstruction, and/or weight based   adjustment of the mA/kV was utilized to reduce the radiation dose to as low   as reasonably achievable.        COMPARISON:   01/01/2023       HISTORY:   ORDERING SYSTEM PROVIDED HISTORY: febrile, abdomen mass   TECHNOLOGIST PROVIDED HISTORY:   febrile, abdomen mass       Reason for Exam: febrile, abdomen mass       FINDINGS:   LOWER CHEST: Axial hiatal hernia containing part of the stomach. Linear   atelectatic changes within the lower lobes. KIDNEYS AND URINARY TRACT: No renal calculi are identified. There is no   evidence for hydronephrosis. The ureters are of normal course and caliber. Right kidney is not visualized in the right renal fossa, unchanged. Simple   cyst within the left kidney. ORGANS: Fatty liver. Status post cholecystectomy. Visualized portions of   the liver, spleen, pancreas, gallbladder, and adrenal glands demonstrate no   acute abnormality. GI/BOWEL: No bowel obstruction. No evidence of acute appendicitis. Reported   history of abdominal mass. No discrete mass is noted within the abdomen. There is significant wall thickening noted within the ascending colon which   was not present on prior examination. Finding may be related to colitis. There is large amount of stool throughout the transverse colon. Charlett Bigger PELVIS: The bladder and pelvic organs are unremarkable. Status   posthysterectomy. PERITONEUM/RETROPERITONEUM: No free air or free fluid is noted. No   pathologically enlarged lymphadenopathy. The vasculature do not demonstrate   acute abnormality. BONES/SOFT TISSUES: The osseous structures demonstrate no acute abnormality. Status post right hip arthroplasty. Moderate levoscoliosis. Impression   There is significant wall thickening noted within the ascending colon which   was not present on prior examination. Finding may be related to colitis. Large arge axial hiatal hernia.       status post right nephrectomy, cholecystectomy and hysterectomy. Fatty liver. Extensive stool throughout the transverse colon.      Medical Decision [id number]:       Medical Decision Making-Other:     Note:  Labs, medications, radiologic studies were reviewed with personal review of films  Large amounts of data were reviewed  Discussed with nursing Staff, Discharge planner  Infection Control and Prevention measures reviewed  All prior entries were reviewed  Administer medications as ordered  Prognosis: [address]  Discharge planning reviewed  Follow up as outpatient. Thank you for allowing us to participate in the care of this patient. Please call with questions. Brad Alcala participated in the evaluation of the patient, under supervision. ATTESTATION:    I have discussed the case, including pertinent history and exam findings with the residents and students. I have seen and examined the patient and the key elements of the encounter have been performed by me. I was present when the student obtained his information or examined the patient. I have reviewed the laboratory data, other diagnostic studies and discussed them with the residents. I have updated the medical record where necessary. I agree with the assessment, plan and orders as documented by the resident/ student.     Yamilet White MD.        Pager: [phone] - Office: [phone]

## 2023-02-06 NOTE — DISCHARGE INSTR - COC
Continuity of Care Form    Patient Name: Guerita Cano   :  1945  MRN:  [id number]    Admit date:  2/3/2023  Discharge date:  23    Code Status Order: Full Code   Advance Directives:     Admitting Physician:  Foster Barros MD  PCP: Marilee Smyth DO    Discharging Nurse: [facility] Unit/Room#: 4018/6849-72  Discharging Unit Phone Number: [phone]    Emergency Contact:   Extended Emergency Contact Information  Primary Emergency Contact: Hamilton Ramirez  Home Phone: [phone]  Relation: Child    Past Surgical History:  Past Surgical History:   Procedure Laterality Date    APPENDECTOMY      CARPAL TUNNEL RELEASE      CHOLECYSTECTOMY, LAPAROSCOPIC N/A 2020    XI LAPAROSCOPIC ROBOTIC CHOLECYSTECTOMY, PYLOROPLASTY performed by Alia Rojas MD at [facility]      COLONOSCOPY N/A 2022    COLONOSCOPY POLYPECTOMY HOT performed by Ranjith Jacinto MD at [address] [place] [address] Drive      ERCP  2020    with stent insertion, balloon dilation, sphinctereotomy    ERCP  2020    ** CASE IN OR WITY GI STAFF** ERCP STENT INSERTION performed by Ranjith Jacinto MD at [facility]    ERCP  2020    ** CASE IN OR WITH GI STAFF**ERCP SPHINCTER/PAPILLOTOMY performed by Ranjith Jacinto MD at [facility]    ERCP  2020    ** CASE IN OR WITH GI STAFF**ERCP DILATION BALLOON performed by Ranjith Jacinto MD at [facility]    ERCP N/A 2021    ERCP STENT REMOVAL performed by Shane Ferguson MD at [facility]    ERCP  2021    ERCP STONE REMOVAL performed by Shane Ferguson MD at [address] [place] [address]    GASTRIC FUNDOPLICATION N/A     XI LAPAROSCOPIC ROBOTIC [address], CHERYL FUNDOPLICATION performed by Alia Rojas MD at [address] 2020    EGD PEG TUBE PLACEMENT performed by Alia Rojas MD at [address] 2021    **CASE IN O.R. W/ GI STAFF - EGD WITH REMOVAL PEG TUBE performed by Christy Paulson MD at [facility]    HAND SURGERY      [facility]  03/04/2020         HERNIA REPAIR      Hiatal hernia    HYSTERECTOMY (CERVIX STATUS UNKNOWN)      Abdominal    IR NONTUNNELED VASCULAR CATHETER  5/10/2022    IR NONTUNNELED VASCULAR CATHETER 5/10/2022 Eugenia Mendoza MD [facility]    JOINT REPLACEMENT Right     right hip    OVARY REMOVAL      RHINOPLASTY      TONSILLECTOMY      TOTAL HIP ARTHROPLASTY      TOTAL NEPHRECTOMY      atrophic from infections, age 13    TRACHEOSTOMY N/A 03/27/2020    **ADD ON **WANTS 10:00AM **TRACHEOTOMY performed by Carol Macias MD at [facility] N/A 04/02/2020    TRACHEOTOMY EXCHANGE performed by Carol Macias MD at [facility] 03/17/2020    BEDSIDE EGD ESOPHAGOGASTRODUODENOSCOPY (ICU) performed by Carol Macias MD at [address] [place] [address] 05/18/2020    EGD BIOPSY performed by Edin Toro MD at [address] [place] [address]  05/18/2020    EGD DILATION BALLOON performed by Edin Toro MD at [address] [place] [address] N/A 07/06/2020    ** CASE IN O.R. WITH GI STAFF** EGD ESOPHAGOGASTRODUODENOSCOPY performed by Carol Lynch MD at [address] [place] [address] 11/09/2020    EGD DIAGNOSTIC ONLY performed by Stefano Tyson MD at [address] [place] [address]  02/08/2021    - EGD WITH REMOVAL PEG TUBE,ERCP STENT REMOVAL,ERCP STONE REMOVAL    UPPER GASTROINTESTINAL ENDOSCOPY N/A 11/24/2021    EGD BIOPSY performed by Stefano Tsyon MD at [address] [place] [address] 9/8/2022    EGD BIOPSY performed by Edin Toro MD at [address] [place] [address] 10/11/2022    EGD BIOPSY performed by Edin Toro MD at [facility]       Immunization History: Immunization History   Administered Date(s) Administered    COVID-19, MODERNA BLUE border, Primary or Immunocompromised, (age 12y+), IM, 100 mcg/0.5mL 01/07/2021, 02/04/2021, 12/25/2021       Active Problems:  Patient Active Problem List   Diagnosis Code    Frequency of urination R35.0    Backache M54.9    GERD (gastroesophageal reflux disease) K21.9    MVP (mitral valve prolapse) I34.1    Depression F32. A    Anxiety F41.9    BENEDICT (acute kidney injury) ([facility] N17.9    Dysphagia R13.10    Hiatal hernia K44.9    History of repair of hiatal hernia Z98.890, Z87.19    Centrilobular emphysema ([facility]) J43.2    Esophageal dilatation K22.89    Shock ([facility]) R57.9    Fever R50.9    Critical illness polyneuropathy ([facility]) G62.81    Gastric outlet obstruction K31.1    Recurrent UTI N39.0    Tracheostomy in place ([facility] Z93.0    H/O gastric ulcer Z87.11    Hyperlipidemia E78.5    Essential hypertension I10    Tobacco abuse Z72.0    Chronic respiratory failure with hypoxia ([facility]) J96.11    S/P percutaneous endoscopic gastrostomy (PEG) tube placement ([facility]) Z93.1    S/P Nissen fundoplication (without gastrostomy tube) procedure Z98.890    Anemia due to blood loss D50.0    Phlebitis after infusion T80. 1XXA, I80.9    Esophagitis determined by endoscopy K20.90    Expressive aphasia R47.01    Transient speech disturbance R47.9    Speech disturbance R47.9    Coffee ground emesis K92.0    Acute cystitis without hematuria N30.00    Ulcerative esophagitis K22.10    Septicemia ([facility]) A41.9    Lower abdominal pain R10.30    Ileus ([facility]) K56.7    Chronic pain G89.29    Dyspnea on exertion R06.09    Orthostatic hypotension I95.1    Stage 3 chronic kidney disease (HCC) N18.30    Difficulty walking R26.2    Class 1 obesity in adult E66.9    Generalized abdominal pain R10.84    Multifocal pneumonia J18.9    Other constipation K59.09    Intractable abdominal pain R10.9    ESBL (extended spectrum beta-lactamase) producing bacteria infection A49.9, Z16.12 Complicated UTI (urinary tract infection) N39.0    COVID-19 U07.1    Acute on chronic respiratory failure with hypoxia (HCC) J96.21    COVID U07.1    Hypoxia R09.02    Elevated C-reactive protein (CRP) R79.82    Chronic obstructive pulmonary disease (HCC) J44.9    Acute respiratory failure with hypoxia (HCC) J96.01    Colitis K52.9    History of ESBL E. coli infection Y83.23    Acute metabolic encephalopathy O24.37    Congenital solitary kidney Q60.0    Polypharmacy Z79.899    Altered mental status R41.82       Isolation/Infection:   Isolation            Contact          Patient Infection Status       Infection Onset Added Last Indicated Last Indicated By Review Planned Expiration Resolved Resolved By    ESBL (Extended Spectrum Beta Lactamase) 12/13/20 12/15/20 07/31/22 Culture, Urine        Klebsiella urine 2022      MDRO (multi-drug resistant organism) 12/13/20 12/15/20 07/31/22 Culture, Urine        Proteus Urine 2021  Klebsiella Urine 2022    Resolved    COVID-19 23 COVID-19, Rapid   01/15/23     COVID-19 (Rule Out) 23 COVID-19, Rapid (Ordered)   23 Rule-Out Test Resulted            Nurse Assessment:  Last Vital Signs: BP (!) 127/112   Pulse 65   Temp 97.9 °F (36.6 °C) (Oral)   Resp 16   Ht 5' 2\" (1.575 m)   Wt 181 lb (82.1 kg)   SpO2 96%   BMI 33.11 kg/m²     Last documented pain score (0-10 scale): Pain Level: 7  Last Weight:   Wt Readings from Last 1 Encounters:   23 181 lb (82.1 kg)     Mental Status:  oriented and alert    IV Access:  - None    Nursing Mobility/ADLs:  Walking   Assisted  Transfer  Assisted  Bathing  Assisted  Dressing  Assisted  Toileting  Assisted  Feeding  Independent  Med Admin  Assisted  Med Delivery   whole and prefers mixed with pudding     Wound Care Documentation and Therapy:        Elimination:  Continence:    Bowel: Yes  Bladder: Yes  Urinary Catheter: None   Colostomy/Ileostomy/Ileal Conduit: No Date of Last BM: 2/8/2023    Intake/Output Summary (Last 24 hours) at 2/6/2023 1222  Last data filed at 2/6/2023 1050  Gross per 24 hour   Intake 1892.54 ml   Output 3000 ml   Net -1107.46 ml     I/O last 3 completed shifts: In: 1782.5 [P.O.:540; I.V.:683.4; IV Piggyback:559.2]  Out: 3000 [Urine:3000]    Safety Concerns:     ***    Impairments/Disabilities:      None    Nutrition Therapy:  Current Nutrition Therapy:   - Oral Diet:  General  - Oral Nutrition Supplement:  Standard  daily and high calorie/high protein at lunch    Routes of Feeding: Oral  Liquids: No Restrictions  Daily Fluid Restriction: no  Last Modified Barium Swallow with Video (Video Swallowing Test): not done    Treatments at the Time of Hospital Discharge:   Respiratory Treatments: ***  Oxygen Therapy:  ***  Ventilator:    - No ventilator support    Rehab Therapies: ***  Weight Bearing Status/Restrictions: No weight bearing restrictions  Other Medical Equipment (for information only, NOT a DME order):  ***  Other Treatments: ***    Patient's personal belongings (please select all that are sent with patient):  ***    RN SIGNATURE:  Electronically signed by Soni Copeland RN on 2/8/23 at 3:44 PM EST    CASE MANAGEMENT/SOCIAL WORK SECTION    Inpatient Status Date: 2/3/23    Readmission Risk Assessment Score:  Readmission Risk              Risk of Unplanned Readmission:  31           Discharging to Facility/ [address] [place] [address]    / signature: Electronically signed by Sterling Dasilva RN on 2/8/23 at 7:33 AM EST    PHYSICIAN SECTION    Prognosis: Good    Condition at Discharge: Stable    Rehab Potential (if transferring to Rehab): Good    Recommended Labs or Other Treatments After Discharge:     Urology instructions:   Intermittent Catheterization Instructions-  When have urge to urinate, try to have patient void on own first every 6 hours (while awake).  Last attempt right before going to sleep, first attempt first thing in the morning. Straight cath using sterile technique for residual urine not able to eliminate on her own. If urine catheter residual is < 100 ml twice in a row, patient can discontinue intermittent cathing. If do not have urge to urinate, should cath every 4-6 hours while awake and record volumes. Take home a urinal or graduated cylinder from hospital.    Physician Certification: I certify the above information and transfer of Oumar Bar  is necessary for the continuing treatment of the diagnosis listed and that she requires [facility] for greater 30 days.      Update Admission H&P: No change in H&P    PHYSICIAN SIGNATURE:  Electronically signed by Gila Xiao MD on 2/8/23 at 12:22 PM EST

## 2023-02-06 NOTE — PLAN OF CARE
Problem: Safety - Adult  Goal: Free from fall injury  2/6/2023 1720 by Chey Mtz RN  Outcome: Progressing  2/6/2023 0617 by Alexys Welch RN  Outcome: Progressing

## 2023-02-07 LAB
ABSOLUTE EOS #: 0.31 K/UL (ref 0–0.44)
ABSOLUTE IMMATURE GRANULOCYTE: 0.1 K/UL (ref 0–0.3)
ABSOLUTE LYMPH #: 0.79 K/UL (ref 1.1–3.7)
ABSOLUTE MONO #: 0.62 K/UL (ref 0.1–1.2)
ANION GAP SERPL CALCULATED.3IONS-SCNC: 11 MMOL/L (ref 9–17)
BASOPHILS # BLD: 1 % (ref 0–2)
BASOPHILS ABSOLUTE: 0.03 K/UL (ref 0–0.2)
BUN SERPL-MCNC: 12 MG/DL (ref 8–23)
CALCIUM SERPL-MCNC: 9.8 MG/DL (ref 8.6–10.4)
CHLORIDE SERPL-SCNC: 101 MMOL/L (ref 98–107)
CO2 SERPL-SCNC: 24 MMOL/L (ref 20–31)
CREAT SERPL-MCNC: 0.79 MG/DL (ref 0.5–0.9)
EOSINOPHILS RELATIVE PERCENT: 9 % (ref 1–4)
GFR SERPL CREATININE-BSD FRML MDRD: >60 ML/MIN/1.73M2
GLUCOSE SERPL-MCNC: 88 MG/DL (ref 70–99)
HCT VFR BLD AUTO: 40.8 % (ref 36.3–47.1)
HGB BLD-MCNC: 12.7 G/DL (ref 11.9–15.1)
IMMATURE GRANULOCYTES: 3 %
LYMPHOCYTES # BLD: 23 % (ref 24–43)
MCH RBC QN AUTO: 29.6 PG (ref 25.2–33.5)
MCHC RBC AUTO-ENTMCNC: 31.1 G/DL (ref 28.4–34.8)
MCV RBC AUTO: 95.1 FL (ref 82.6–102.9)
MONOCYTES # BLD: 18 % (ref 3–12)
NRBC AUTOMATED: 0 PER 100 WBC
PDW BLD-RTO: 14.8 % (ref 11.8–14.4)
PLATELET # BLD AUTO: 165 K/UL (ref 138–453)
PMV BLD AUTO: 11.1 FL (ref 8.1–13.5)
POTASSIUM SERPL-SCNC: 4.3 MMOL/L (ref 3.7–5.3)
RBC # BLD: 4.29 M/UL (ref 3.95–5.11)
RBC # BLD: ABNORMAL 10*6/UL
SEG NEUTROPHILS: 46 % (ref 36–65)
SEGMENTED NEUTROPHILS ABSOLUTE COUNT: 1.57 K/UL (ref 1.5–8.1)
SODIUM SERPL-SCNC: 136 MMOL/L (ref 135–144)
WBC # BLD AUTO: 3.4 K/UL (ref 3.5–11.3)

## 2023-02-07 PROCEDURE — 6370000000 HC RX 637 (ALT 250 FOR IP)

## 2023-02-07 PROCEDURE — 2580000003 HC RX 258: Performed by: INTERNAL MEDICINE

## 2023-02-07 PROCEDURE — 6370000000 HC RX 637 (ALT 250 FOR IP): Performed by: STUDENT IN AN ORGANIZED HEALTH CARE EDUCATION/TRAINING PROGRAM

## 2023-02-07 PROCEDURE — 6360000002 HC RX W HCPCS

## 2023-02-07 PROCEDURE — 6360000002 HC RX W HCPCS: Performed by: STUDENT IN AN ORGANIZED HEALTH CARE EDUCATION/TRAINING PROGRAM

## 2023-02-07 PROCEDURE — 85025 COMPLETE CBC W/AUTO DIFF WBC: CPT

## 2023-02-07 PROCEDURE — 2060000000 HC ICU INTERMEDIATE R&B

## 2023-02-07 PROCEDURE — 99232 SBSQ HOSP IP/OBS MODERATE 35: CPT | Performed by: INTERNAL MEDICINE

## 2023-02-07 PROCEDURE — 2580000003 HC RX 258

## 2023-02-07 PROCEDURE — 36415 COLL VENOUS BLD VENIPUNCTURE: CPT

## 2023-02-07 PROCEDURE — 80048 BASIC METABOLIC PNL TOTAL CA: CPT

## 2023-02-07 PROCEDURE — 6370000000 HC RX 637 (ALT 250 FOR IP): Performed by: INTERNAL MEDICINE

## 2023-02-07 PROCEDURE — 6360000002 HC RX W HCPCS: Performed by: INTERNAL MEDICINE

## 2023-02-07 PROCEDURE — 51798 US URINE CAPACITY MEASURE: CPT

## 2023-02-07 RX ORDER — BACLOFEN 5 MG/1
5 TABLET ORAL 3 TIMES DAILY PRN
Status: DISCONTINUED | OUTPATIENT
Start: 2023-02-07 | End: 2023-02-08 | Stop reason: HOSPADM

## 2023-02-07 RX ORDER — TROSPIUM CHLORIDE 20 MG/1
20 TABLET, FILM COATED ORAL
Qty: 60 TABLET | Refills: 3 | Status: SHIPPED | OUTPATIENT
Start: 2023-02-08 | End: 2023-02-07 | Stop reason: SDUPTHER

## 2023-02-07 RX ORDER — TAMSULOSIN HYDROCHLORIDE 0.4 MG/1
0.4 CAPSULE ORAL DAILY
Qty: 30 CAPSULE | Refills: 3 | Status: SHIPPED | OUTPATIENT
Start: 2023-02-08 | End: 2023-02-08 | Stop reason: HOSPADM

## 2023-02-07 RX ORDER — BENZONATATE 100 MG/1
100 CAPSULE ORAL 3 TIMES DAILY PRN
Qty: 21 CAPSULE | Refills: 0 | Status: SHIPPED | OUTPATIENT
Start: 2023-02-07 | End: 2023-02-14

## 2023-02-07 RX ORDER — BENZONATATE 100 MG/1
100 CAPSULE ORAL 3 TIMES DAILY PRN
Qty: 21 CAPSULE | Refills: 0 | Status: SHIPPED | OUTPATIENT
Start: 2023-02-07 | End: 2023-02-07 | Stop reason: SDUPTHER

## 2023-02-07 RX ORDER — BACLOFEN 5 MG/1
5 TABLET ORAL ONCE
Status: DISCONTINUED | OUTPATIENT
Start: 2023-02-07 | End: 2023-02-07

## 2023-02-07 RX ORDER — TRAZODONE HYDROCHLORIDE 100 MG/1
100 TABLET ORAL NIGHTLY
Status: DISCONTINUED | OUTPATIENT
Start: 2023-02-07 | End: 2023-02-08 | Stop reason: HOSPADM

## 2023-02-07 RX ORDER — TROSPIUM CHLORIDE 20 MG/1
20 TABLET, FILM COATED ORAL
Qty: 60 TABLET | Refills: 3 | Status: SHIPPED | OUTPATIENT
Start: 2023-02-08

## 2023-02-07 RX ORDER — BACLOFEN 5 MG/1
5 TABLET ORAL 3 TIMES DAILY
Status: DISCONTINUED | OUTPATIENT
Start: 2023-02-07 | End: 2023-02-07

## 2023-02-07 RX ORDER — TAMSULOSIN HYDROCHLORIDE 0.4 MG/1
0.4 CAPSULE ORAL DAILY
Qty: 30 CAPSULE | Refills: 3 | Status: SHIPPED | OUTPATIENT
Start: 2023-02-08 | End: 2023-02-07 | Stop reason: SDUPTHER

## 2023-02-07 RX ADMIN — HEPARIN SODIUM 5000 UNITS: 5000 INJECTION INTRAVENOUS; SUBCUTANEOUS at 17:01

## 2023-02-07 RX ADMIN — TROSPIUM CHLORIDE 20 MG: 20 TABLET, FILM COATED ORAL at 17:01

## 2023-02-07 RX ADMIN — CARBOXYMETHYLCELLULOSE SODIUM 1 DROP: 10 GEL OPHTHALMIC at 17:03

## 2023-02-07 RX ADMIN — POLYETHYLENE GLYCOL 3350 17 G: 17 POWDER, FOR SOLUTION ORAL at 10:25

## 2023-02-07 RX ADMIN — MAGNESIUM HYDROXIDE 30 ML: 400 SUSPENSION ORAL at 21:16

## 2023-02-07 RX ADMIN — MEROPENEM 1000 MG: 1 INJECTION, POWDER, FOR SOLUTION INTRAVENOUS at 13:53

## 2023-02-07 RX ADMIN — GABAPENTIN 300 MG: 300 CAPSULE ORAL at 09:21

## 2023-02-07 RX ADMIN — BUSPIRONE HYDROCHLORIDE 10 MG: 10 TABLET ORAL at 09:21

## 2023-02-07 RX ADMIN — ONDANSETRON 4 MG: 4 TABLET, ORALLY DISINTEGRATING ORAL at 14:28

## 2023-02-07 RX ADMIN — AMITRIPTYLINE HYDROCHLORIDE 25 MG: 25 TABLET, FILM COATED ORAL at 09:22

## 2023-02-07 RX ADMIN — PANTOPRAZOLE SODIUM 40 MG: 40 TABLET, DELAYED RELEASE ORAL at 09:21

## 2023-02-07 RX ADMIN — CLONAZEPAM 0.5 MG: 0.5 TABLET ORAL at 18:35

## 2023-02-07 RX ADMIN — KETOROLAC TROMETHAMINE 15 MG: 15 INJECTION, SOLUTION INTRAMUSCULAR; INTRAVENOUS at 10:25

## 2023-02-07 RX ADMIN — GABAPENTIN 300 MG: 300 CAPSULE ORAL at 17:17

## 2023-02-07 RX ADMIN — KETOROLAC TROMETHAMINE 15 MG: 15 INJECTION, SOLUTION INTRAMUSCULAR; INTRAVENOUS at 17:01

## 2023-02-07 RX ADMIN — Medication 1 CAPSULE: at 21:16

## 2023-02-07 RX ADMIN — PHENAZOPYRIDINE HYDROCHLORIDE 200 MG: 100 TABLET ORAL at 13:47

## 2023-02-07 RX ADMIN — OXYCODONE HYDROCHLORIDE AND ACETAMINOPHEN 1 TABLET: 5; 325 TABLET ORAL at 13:14

## 2023-02-07 RX ADMIN — FERROUS SULFATE TAB EC 325 MG (65 MG FE EQUIVALENT) 325 MG: 325 (65 FE) TABLET DELAYED RESPONSE at 09:20

## 2023-02-07 RX ADMIN — ESCITALOPRAM OXALATE 20 MG: 10 TABLET ORAL at 09:21

## 2023-02-07 RX ADMIN — MEROPENEM 1000 MG: 1 INJECTION, POWDER, FOR SOLUTION INTRAVENOUS at 21:12

## 2023-02-07 RX ADMIN — Medication 600 MG: at 09:21

## 2023-02-07 RX ADMIN — DESMOPRESSIN ACETATE 40 MG: 0.2 TABLET ORAL at 09:21

## 2023-02-07 RX ADMIN — PHENAZOPYRIDINE HYDROCHLORIDE 200 MG: 100 TABLET ORAL at 09:21

## 2023-02-07 RX ADMIN — TAMSULOSIN HYDROCHLORIDE 0.4 MG: 0.4 CAPSULE ORAL at 09:21

## 2023-02-07 RX ADMIN — HEPARIN SODIUM 5000 UNITS: 5000 INJECTION INTRAVENOUS; SUBCUTANEOUS at 21:17

## 2023-02-07 RX ADMIN — BUSPIRONE HYDROCHLORIDE 10 MG: 10 TABLET ORAL at 21:16

## 2023-02-07 RX ADMIN — CARBOXYMETHYLCELLULOSE SODIUM 1 DROP: 10 GEL OPHTHALMIC at 09:28

## 2023-02-07 RX ADMIN — TROSPIUM CHLORIDE 20 MG: 20 TABLET, FILM COATED ORAL at 09:20

## 2023-02-07 RX ADMIN — SODIUM CHLORIDE, PRESERVATIVE FREE 10 ML: 5 INJECTION INTRAVENOUS at 09:28

## 2023-02-07 RX ADMIN — GABAPENTIN 300 MG: 300 CAPSULE ORAL at 21:16

## 2023-02-07 RX ADMIN — Medication 1 CAPSULE: at 09:22

## 2023-02-07 RX ADMIN — MEROPENEM 1000 MG: 1 INJECTION, POWDER, FOR SOLUTION INTRAVENOUS at 05:14

## 2023-02-07 RX ADMIN — QUETIAPINE FUMARATE 100 MG: 100 TABLET ORAL at 21:16

## 2023-02-07 RX ADMIN — TRAZODONE HYDROCHLORIDE 100 MG: 100 TABLET ORAL at 21:16

## 2023-02-07 RX ADMIN — METOPROLOL SUCCINATE 25 MG: 25 TABLET, FILM COATED, EXTENDED RELEASE ORAL at 09:21

## 2023-02-07 RX ADMIN — PHENAZOPYRIDINE HYDROCHLORIDE 200 MG: 100 TABLET ORAL at 17:03

## 2023-02-07 RX ADMIN — HEPARIN SODIUM 5000 UNITS: 5000 INJECTION INTRAVENOUS; SUBCUTANEOUS at 06:31

## 2023-02-07 RX ADMIN — PANTOPRAZOLE SODIUM 40 MG: 40 TABLET, DELAYED RELEASE ORAL at 21:16

## 2023-02-07 RX ADMIN — CLONAZEPAM 0.5 MG: 0.5 TABLET ORAL at 10:25

## 2023-02-07 RX ADMIN — GUAIFENESIN 600 MG: 600 TABLET, EXTENDED RELEASE ORAL at 09:21

## 2023-02-07 RX ADMIN — Medication 2000 UNITS: at 09:20

## 2023-02-07 ASSESSMENT — PAIN DESCRIPTION - DESCRIPTORS
DESCRIPTORS: ACHING;CRAMPING
DESCRIPTORS: CRAMPING;ACHING
DESCRIPTORS: CRAMPING;ACHING
DESCRIPTORS: ACHING;CRAMPING
DESCRIPTORS: CRAMPING

## 2023-02-07 ASSESSMENT — PAIN DESCRIPTION - FREQUENCY
FREQUENCY: CONTINUOUS

## 2023-02-07 ASSESSMENT — PAIN SCALES - GENERAL
PAINLEVEL_OUTOF10: 0
PAINLEVEL_OUTOF10: 8
PAINLEVEL_OUTOF10: 7
PAINLEVEL_OUTOF10: 7
PAINLEVEL_OUTOF10: 8
PAINLEVEL_OUTOF10: 8
PAINLEVEL_OUTOF10: 6

## 2023-02-07 ASSESSMENT — PAIN - FUNCTIONAL ASSESSMENT
PAIN_FUNCTIONAL_ASSESSMENT: ACTIVITIES ARE NOT PREVENTED

## 2023-02-07 ASSESSMENT — PAIN DESCRIPTION - LOCATION
LOCATION: ABDOMEN
LOCATION: ABDOMEN
LOCATION: ABDOMEN;KNEE
LOCATION: ABDOMEN;KNEE
LOCATION: ABDOMEN

## 2023-02-07 ASSESSMENT — PAIN DESCRIPTION - PAIN TYPE
TYPE: CHRONIC PAIN

## 2023-02-07 ASSESSMENT — PAIN DESCRIPTION - ONSET
ONSET: ON-GOING

## 2023-02-07 NOTE — PROGRESS NOTES
[facility] - Progress Note    Today's Date and Time: 2/7/2023, 8:44 AM    Impression :   Altered  mental status  Prior COVID 19 Confirmed Infection 1/1/23  Vaccination status:  Patient received 3 doses of Peterson Milo in 2021. Overdue for Covid booster since 2/19/22. Recent infection will count as a 4th dose of vaccine  She will still need a Bivalent booster in April 2023  Acute hypoxic respiratory distress  Elevated CRP  Paroxysmal Afib  HTN  GERD  COPD  Solitary left kidney  Chronic abdominal pain  Paraplegia  Wheel-chair bound  Chronic gastritis  Anxiety  Hx MDRO and ESBL  PCN allergy    Recommendations:   Continue Meropenem 1 gm IV q 6 hr for Aerococcus in the urine culture until discharge. Can stop meropenem at time of discharge  Patient is allergic to Ampicillin   No available oral alternatives    Medical Decision Making/Summary/Discussion:2/7/2023       Infection Control Recommendations   [place] Precautions  Contact Isolation  ESBL, MDRO  Antimicrobial Stewardship Recommendations     Simplification of therapy  Targeted therapy    Coordination of Outpatient Care:   Estimated Length of IV antimicrobials: TBD  Patient will need Midline Catheter Insertion: TBD  Patient will need PICC line Insertion:TBD   Patient will need: Home IV , [place],  SNF,  LTAC: TBD  Patient will need outpatient wound care:No    Chief complaint/reason for consultation:   Altered mentation  Suspected complicated UTI      History of Present Illness:   Kamille Hope is a 68y.o.-year-old  female who was initially admitted on 2/3/2023. Patient seen at the request of Zack Laguerre. INITIAL HISTORY:    Patient known to our service from prior admission to [facility] on 1-1-23 when she was treated for a fall and for Covid 19. She received Paxlovid.      She has a complicated past medical Hx, including:  Paroxysmal Afib  Essential HTN  GERD  COPD  Solitary left kidney  Chronic abdominal pain  Paraplegia  Wheel-chair bound  Chronic gastritis  Anxiety  Hx MDRO and ESBL with prior ESBL + UTIs  PCN allergy    On current admission 2-3-23 patient was transferred from SNF with slurred speech and altered mentation. She underwent stroke alert evaluation which was negative. Patient in the past has shown altered mentation when a UTI affects her. Cultures taken and patient started on Meropenem. CURRENT EVALUATION : 2/7/2023    BP (!) 122/53   Pulse 74   Temp 98.4 °F (36.9 °C) (Oral)   Resp 14   Ht 5' 2\" (1.575 m)   Wt 181 lb (82.1 kg)   SpO2 96%   BMI 33.11 kg/m²      Afebrile  VS stable, hypotensive  RR 12-19  96% on 2L NC    Patient stable- no overnight events    She reports dysuria but better than yesterday. Has increased frequency but unable to void completely per nurse. Bladder scan this morning showed approximately 600 mL. She has residual suprapubic pain  She is constipated  She denies headaches, chills, diaphoresis, dyspnea, chest pain. Medications reviewed  On meropenem  Urine culture with Aerococcus  Allergic to penicillin, ampicillin    CT abdomen 2-3-23 with retained stool and mild thickening of wall of ascending colon. Solitary kidney. Labs, X rays reviewed: 2/7/2023    BUN: 10 --> 10 --> 12  Cr: 0.88 --> 0.84 -->0.79    WBC: 3.1 --> 2.7 --> 3.4  Hb: 12.1 --> 11.8 --> 12.7  Plat: 147 --> 127 --> 165    Cultures:  Urine:  2-3-23: AEROCOCCUS URINAE >[id number] CFU/ML  Blood:  2-3-23: No growth x 2  Sputum :  1-1-23: Normal teresa  Wound:      Discussed with patient, RN, IM. I have personally reviewed the past medical history, past surgical history, medications, social history, and family history, and I have updated the database accordingly.   Past Medical History:     Past Medical History:   Diagnosis Date    A-fib [facility])     Acquired absence of kidney     Adult hypertrophic pyloric stenosis     Agoraphobia with panic disorder     Agoraphobia without history of panic disorder     Allergic rhinitis     Anemia, unspecified     Anxiety     Anxiety disorder     Arthritis     Atrial fibrillation (HCC)     Back pain     Bronchitis     Caffeine use     8 coffee / day    Cardiomegaly     Carpal tunnel syndrome     Cataracts, bilateral     Centriacinar emphysema (HCC)     Chronic kidney disease, stage III (moderate) (HCC)     Chronic pain     Constipation     Constipation     COPD (chronic obstructive pulmonary disease) (HCC)     Emphysema    Cystitis with hematuria     Depression     Diaphragmatic hernia without obstruction or gangrene     Diarrhea     Difficulty walking     Dry eye syndrome of unspecified lacrimal gland     Esophageal obstruction     FOM (frequency of micturition)     Foot drop, right foot     Gastro-esophageal reflux disease with esophagitis, with bleeding     GERD (gastroesophageal reflux disease)     H/O gastric ulcer     HTN (hypertension)     Hyperlipidemia     Hypertension, essential     Klebsiella pneumoniae (k. pneumoniae) as the cause of diseases classified elsewhere     Major depression, recurrent (HCC)     Mitral valve prolapse     Mixed hyperlipidemia     Mumps     Muscle weakness (generalized)     MVP (mitral valve prolapse)     Dr. Jodi Griffith in May 2019    Old myocardial infarction     Personal history of disease of digestive system     Psychophysiological insomnia     Recurrent UTI     Dr. Ami Restrepo    S/P PICC central line placement 08/05/2022    \"no longer present\" per Evi Cantu (Nurse) at [address] [place] [address]. Sepsis ([facility] [address]     Sinusitis     Tracheostomy in place [facility])     \"No longer present\" per Evi Cantu (Nurse) at [facility] [place].     Ulcerative esophagitis     Urinary tract infection, site not specified     Uses wheelchair     Wears glasses     Wellness examination     Dr. Fern Eason seen in Jan 2020       Past Surgical  History:     Past Surgical History:   Procedure Laterality Date    APPENDECTOMY      CARPAL TUNNEL RELEASE CHOLECYSTECTOMY, LAPAROSCOPIC N/A 05/22/2020    XI LAPAROSCOPIC ROBOTIC CHOLECYSTECTOMY, PYLOROPLASTY performed by Jaqueline Munoz MD at [address] [facility]      COLONOSCOPY N/A 1/28/2022    COLONOSCOPY POLYPECTOMY HOT performed by Huang Garcia MD at [address]      ERCP  05/21/2020    with stent insertion, balloon dilation, sphinctereotomy    ERCP  05/21/2020    ** CASE IN OR WITY GI STAFF** ERCP STENT INSERTION performed by Huang Garcia MD at [facility]    ERCP  05/21/2020    ** CASE IN OR WITH GI STAFF**ERCP SPHINCTER/PAPILLOTOMY performed by Hunag Garcia MD at [facility]    ERCP  05/21/2020    ** CASE IN OR WITH GI STAFF**ERCP DILATION BALLOON performed by Huang Garcia MD at [facility]    ERCP N/A 2/8/2021    ERCP STENT REMOVAL performed by Brian Wild MD at [facility]    ERCP  2/8/2021    ERCP STONE REMOVAL performed by Brian Wild MD at [address] [facility]    GASTRIC FUNDOPLICATION N/A 95/13/2500    XI LAPAROSCOPIC ROBOTIC [address] [facility] [address], CHERYL FUNDOPLICATION performed by Jaqueline Munoz MD at [address] 03/27/2020    EGD PEG TUBE PLACEMENT performed by Jaqueline Munoz MD at [address] N/A 2/8/2021    **CASE IN O.R. W/ GI STAFF - EGD WITH REMOVAL PEG TUBE performed by Brian Wild MD at [facility]    [facility]      [facility]. CATH POWER PICC TRIPLE  03/04/2020         HERNIA REPAIR      Hiatal hernia    HYSTERECTOMY (CERVIX STATUS UNKNOWN)      Abdominal    IR NONTUNNELED VASCULAR CATHETER  5/10/2022    IR NONTUNNELED VASCULAR CATHETER 5/10/2022 Darshana Noble MD [facility]    JOINT REPLACEMENT Right     right hip    OVARY REMOVAL      RHINOPLASTY      TONSILLECTOMY      TOTAL HIP ARTHROPLASTY      TOTAL NEPHRECTOMY      atrophic from infections, age 13    TRACHEOSTOMY N/A 03/27/2020    **ADD ON **WANTS 10:00AM **TRACHEOTOMY performed by Jaqueline Munoz MD at [address] [facility] N/A 04/02/2020    TRACHEOTOMY EXCHANGE performed by Renata Loja MD at [facility] 03/17/2020    BEDSIDE EGD ESOPHAGOGASTRODUODENOSCOPY (ICU) performed by Renata Loja MD at [address] [place] [address],[facility] 05/18/2020    EGD BIOPSY performed by Ruy Emanuel MD at [address] [place] [address],[facility]  05/18/2020    EGD DILATION BALLOON performed by Ruy Emanuel MD at [address] [place] [address],[facility] N/A 07/06/2020    ** CASE IN O.R. WITH GI STAFF** EGD ESOPHAGOGASTRODUODENOSCOPY performed by Raven Lee MD at [address] [place] [address],[facility] 11/09/2020    EGD DIAGNOSTIC ONLY performed by Nicholas Styles MD at [address] [place] [address],[facility]  02/08/2021    - EGD WITH REMOVAL PEG TUBE,ERCP STENT REMOVAL,ERCP STONE REMOVAL    UPPER GASTROINTESTINAL ENDOSCOPY N/A 11/24/2021    EGD BIOPSY performed by Nicholas Styles MD at [address] [place] [address],[facility] N/A 9/8/2022    EGD BIOPSY performed by Ruy Emanuel MD at [address] [place] [address] N/A 10/11/2022    EGD BIOPSY performed by Ruy Emanuel MD at [facility]       Medications:      polyethylene glycol  17 g Oral Daily    phenazopyridine  200 mg Oral TID WC    tamsulosin  0.4 mg Oral Daily    amitriptyline  25 mg Oral TID    busPIRone  10 mg Oral BID    carboxymethylcellulose PF  1 drop Both Eyes Q6H    Vitamin D  2,000 Units Oral Daily    escitalopram  20 mg Oral Daily    ferrous sulfate  325 mg Oral Daily with breakfast    trospium  20 mg Oral BID AC    [Held by provider] fluticasone  1 spray Each Nostril Daily    gabapentin  300 mg Oral TID    guaiFENesin  600 mg Oral Daily    lactobacillus   Oral BID    [Held by provider] cetirizine  5 mg Oral Daily    magnesium hydroxide  30 mL Oral Nightly    metoprolol succinate  25 mg Oral Daily pantoprazole  40 mg Oral BID    QUEtiapine  100 mg Oral Nightly    atorvastatin  40 mg Oral Daily    traZODone  100 mg Oral Nightly    sodium chloride flush  5-40 mL IntraVENous 2 times per day    heparin (porcine)  5,000 Units SubCUTAneous 3 times per day    meropenem  1,000 mg IntraVENous Q8H    calcium carbonate  600 mg Oral BID       Social History:     Social History     Socioeconomic History    Marital status:      Spouse name: Not on file    Number of children: 2    Years of education: Not on file    Highest education level: Not on file   Occupational History    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   Tobacco Use    Smoking status: Former     Packs/day: 1.00     Years: 50.00     Pack years: 50.00     Types: Cigarettes    Smokeless tobacco: Never    Tobacco comments:     quit 1 year ago    Vaping Use    Vaping Use: Former    Substances: Always   Substance and Sexual Activity    Alcohol use: No    Drug use: No    Sexual activity: Never   Other Topics Concern    Not on file   Social History Narrative    Not on file     Social Determinants of Health     Financial Resource Strain: Not on file   Food Insecurity: Not on file   Transportation Needs: Not on file   Physical Activity: Not on file   Stress: Not on file   Social Connections: Not on file   Intimate Partner Violence: Not on file   Housing Stability: Not on file       Family History:     Family History   Problem Relation Age of Onset    Cancer Mother         uterine    Heart Attack Mother     Heart Attack Father     Other Maternal Grandfather         foot gangrene    Other Paternal Grandmother         bleeding ulcers    Other Paternal Grandfather         lung cancer        Allergies:   Prednisone, Compazine [prochlorperazine maleate], Penicillin v potassium, and Penicillins     Review of Systems:     Unable to provide much information for ROS. Somnolent. 2/7/2023       Constitutional: No fevers or chills.  No systemic complaints  Head: No headaches  Eyes: No double vision or blurry vision. No conjunctival inflammation. ENT: No sore throat or runny nose. . No hearing loss, tinnitus or vertigo. Cardiovascular: No chest pain or palpitations. No shortness of breath. No HOLLAND  Lung: No shortness of breath or cough. No sputum production  Abdomen: No nausea, vomiting, diarrhea, or abdominal pain. Emilio Postal No cramps. Genitourinary: No increased urinary frequency, or dysuria. No hematuria. No suprapubic or CVA pain  Musculoskeletal: No muscle aches or pains. No joint effusions, swelling or deformities  Hematologic: No bleeding or bruising. Neurologic: No headache, weakness, numbness, or tingling. Integument: No rash, no ulcers. Psychiatric: No depression. Endocrine: No polyuria, no polydipsia, no polyphagia. Physical Examination :   Patient Vitals for the past 8 hrs:   BP Temp Temp src Pulse Resp SpO2   02/07/23 0830 (!) 122/53 98.4 °F (36.9 °C) Oral 74 14 96 %     General Appearance: Somnolent, and in no apparent distress  Head:  Normocephalic, no trauma  Eyes: Pupils equal, round, reactive to light and accommodation; extraocular movements intact; sclera anicteric; conjunctivae pink. No embolic phenomena. ENT: Oropharynx clear, without erythema, exudate, or thrush. No tenderness of sinuses. Mouth/throat: mucosa pink and moist. No lesions. Dentition in good repair. Neck:Supple, without lymphadenopathy. Thyroid normal, No bruits. Pulmonary/Chest: Clear to auscultation, without wheezes, rales, or rhonchi. No dullness to percussion. Cardiovascular: Regular rate and rhythm without murmurs, rubs, or gallops. Abdomen: Soft, non tender. Bowel sounds normal. No organomegaly. Suprapubic tenderness to palpation  All four Extremities: No cyanosis, clubbing, edema, or effusions. Neurologic: No gross sensory or motor deficits. Patient somnolent  Skin: Warm and dry with good turgor. Signs of peripheral arterial insufficiency. No ulcerations. No open wounds.     Medical Decision Making -Laboratory:   I have independently reviewed/ordered the following labs:    CBC with Differential:   Recent Labs     02/06/23  0430 02/07/23  0456   WBC 2.7* 3.4*   HGB 11.8* 12.7   HCT 38.8 40.8   * 165   LYMPHOPCT 17* 23*   MONOPCT 13* 18*     BMP:   Recent Labs     02/06/23  0430 02/07/23  0456    136   K 4.2 4.3    101   CO2 24 24   BUN 10 12   CREATININE 0.84 0.79     Hepatic Function Panel: No results for input(s): PROT, LABALBU, BILIDIR, IBILI, BILITOT, ALKPHOS, ALT, AST in the last 72 hours. No results for input(s): RPR in the last 72 hours. No results for input(s): HIV in the last 72 hours. No results for input(s): BC in the last 72 hours. Lab Results   Component Value Date/Time    MUCUS NOT REPORTED 12/18/2021 12:26 PM    RBC 4.29 02/07/2023 04:56 AM    TRICHOMONAS NOT REPORTED 12/18/2021 12:26 PM    WBC 3.4 02/07/2023 04:56 AM    YEAST NOT REPORTED 12/18/2021 12:26 PM    TURBIDITY Clear 02/03/2023 01:26 PM     Lab Results   Component Value Date/Time    CREATININE 0.79 02/07/2023 04:56 AM    GLUCOSE 88 02/07/2023 04:56 AM       Medical Decision Making-Imaging:     Renal Ultrasound 2/6/23    Impression   Unremarkable left kidney. Status post right nephrectomy               EXAMINATION:   ONE X-RAY VIEW OF THE CHEST       2/3/2023 1:21 pm       COMPARISON:   01/05/2023       HISTORY:   ORDERING SYSTEM PROVIDED HISTORY: sepsis   TECHNOLOGIST PROVIDED HISTORY:   sepsis       FINDINGS:   Heart size is stable. Mediastinal contours are unchanged. There is improved   aeration in the left base with mild residual atelectasis/infiltrate. Patulous esophagus noted. No new airspace consolidation. No pneumothorax or   pleural effusion. Impression   Mildly improved aeration at the left base with mild residual   atelectasis/infiltrate.        EXAMINATION:   CT OF THE ABDOMEN AND PELVIS WITH CONTRAST 2/3/2023 12:42 pm       TECHNIQUE:   CT of the abdomen and pelvis was performed with the administration of   intravenous contrast. Multiplanar reformatted images are provided for review. Automated exposure control, iterative reconstruction, and/or weight based   adjustment of the mA/kV was utilized to reduce the radiation dose to as low   as reasonably achievable. COMPARISON:   01/01/2023       HISTORY:   ORDERING SYSTEM PROVIDED HISTORY: febrile, abdomen mass   TECHNOLOGIST PROVIDED HISTORY:   febrile, abdomen mass       Reason for Exam: febrile, abdomen mass       FINDINGS:   LOWER CHEST: Axial hiatal hernia containing part of the stomach. Linear   atelectatic changes within the lower lobes. KIDNEYS AND URINARY TRACT: No renal calculi are identified. There is no   evidence for hydronephrosis. The ureters are of normal course and caliber. Right kidney is not visualized in the right renal fossa, unchanged. Simple   cyst within the left kidney. ORGANS: Fatty liver. Status post cholecystectomy. Visualized portions of   the liver, spleen, pancreas, gallbladder, and adrenal glands demonstrate no   acute abnormality. GI/BOWEL: No bowel obstruction. No evidence of acute appendicitis. Reported   history of abdominal mass. No discrete mass is noted within the abdomen. There is significant wall thickening noted within the ascending colon which   was not present on prior examination. Finding may be related to colitis. There is large amount of stool throughout the transverse colon. Janet Critchley PELVIS: The bladder and pelvic organs are unremarkable. Status   posthysterectomy. PERITONEUM/RETROPERITONEUM: No free air or free fluid is noted. No   pathologically enlarged lymphadenopathy. The vasculature do not demonstrate   acute abnormality. BONES/SOFT TISSUES: The osseous structures demonstrate no acute abnormality. Status post right hip arthroplasty. Moderate levoscoliosis.            Impression   There is significant wall thickening noted within the ascending colon which   was not present on prior examination. Finding may be related to colitis. Large arge axial hiatal hernia.       status post right nephrectomy, cholecystectomy and hysterectomy. Fatty liver. Extensive stool throughout the transverse colon. Medical Decision [id number]:       Medical Decision Making-Other:     Note:  Labs, medications, radiologic studies were reviewed with personal review of films  Large amounts of data were reviewed  Discussed with nursing Staff, Discharge planner  Infection Control and Prevention measures reviewed  All prior entries were reviewed  Administer medications as ordered  Prognosis: [address]  Discharge planning reviewed  Follow up as outpatient. Thank you for allowing us to participate in the care of this patient. Please call with questions. Evita Beckford participated in the evaluation of the patient, under supervision. ATTESTATION:    I have discussed the case, including pertinent history and exam findings with the residents and students. I have seen and examined the patient and the key elements of the encounter have been performed by me. I was present when the student obtained his information or examined the patient. I have reviewed the laboratory data, other diagnostic studies and discussed them with the residents. I have updated the medical record where necessary. I agree with the assessment, plan and orders as documented by the resident/ student.     Ayan Bowling MD.        Pager: [phone] - Office: [phone]

## 2023-02-07 NOTE — PROGRESS NOTES
[facility]  Internal Medicine Teaching Residency Program  Inpatient Daily Progress Note  ______________________________________________________________________________    Patient: Miky Crowder  YOB: 1945   LRX:[id number]    Acct: [de-identified]     Room: [address] [place] [address]  Admit date: 2/3/2023  Today's date: 23  Number of days in the hospital: 4    SUBJECTIVE   Admitting Diagnosis: <principal problem not specified>  CC: UTI    Pt examined at bedside. Chart & results reviewed. No acute events reported overnight. Patient is alert and oriented, denies any new complaints. Hemodynamically stable. Blood pressure well controlled. In no acute distress. Patient is on 2 L nasal cannula. Morning electrolytes WNL. WBC 3.4. Urine output 2.2 L yesterday. Glucose well controlled. Patient's urine cultures grew Aerococcus. Patient is currently on meropenem 1 g IV every 6 hours as per ID.      BRIEF HISTORY        The patient is a pleasant 68 y.o. female with a past medical history significant for recurrent UTIs, recent pneumonia and recent COVID diagnosis. Patient also has a past medical history of CKD stage IIIa with solitary kidney, paroxysmal A. fib, COPD, falls, myelopathy with persistent lower extremity weakness, chronic abdominal pain. presented from nursing home after she was found altered and had slurred speech and some weakness. Stroke alert were announced upon arrival of the patient because of the neurological deficit CT head and CTA were unremarkable. Patient reported that every time she has the same symptoms he is found to have UTI that is why he was sent to the hospital.   In the ED she was found to have UTI and culture sent.     OBJECTIVE     Vital Signs:  BP (!) 95/54   Pulse 77   Temp 98.6 °F (37 °C) (Oral)   Resp 12   Ht 5' 2\" (1.575 m)   Wt 181 lb (82.1 kg)   SpO2 93%   BMI 33.11 kg/m²     Temp (24hrs), Av.3 °F (36.8 °C), Min:97.9 °F (36.6 °C), Max:98.7 °F (37.1 °C)    In: 1050   Out: 2200 [Urine:2200]    Physical Exam:  Constitutional: This is a well developed, well nourished, 30-34.9 - Obesity Grade I 68y.o. year old female who is alert, oriented, cooperative and in no apparent distress. Head:normocephalic and atraumatic. EENT:  PERRLA. No conjunctival injections. Septum was midline, mucosa was without erythema, exudates or cobblestoning. No thrush was noted. Neck: Supple without thyromegaly. No elevated JVP. Trachea was midline. Respiratory: Chest was symmetrical without dullness to percussion. Breath sounds bilaterally were clear to auscultation. There were no wheezes, rhonchi or rales. There is no intercostal retraction or use of accessory muscles. No egophony noted. Cardiovascular: Regular without murmur, clicks, gallops or rubs. Abdomen: Slightly rounded and soft without organomegaly. No rebound, rigidity or guarding was appreciated. Lymphatic: No lymphadenopathy. Musculoskeletal: Normal curvature of the spine. No gross muscle weakness. Extremities:  No lower extremity edema, ulcerations, tenderness, varicosities or erythema. Muscle size, tone and strength are normal.  No involuntary movements are noted. Skin:  Warm and dry. Good color, turgor and pigmentation. No lesions or scars.   No cyanosis or clubbing  Neurological/Psychiatric: The patient's general behavior, level of consciousness, thought content and emotional status is normal.        Medications:  Scheduled Medications:    polyethylene glycol  17 g Oral Daily    phenazopyridine  200 mg Oral TID     tamsulosin  0.4 mg Oral Daily    amitriptyline  25 mg Oral TID    busPIRone  10 mg Oral BID    carboxymethylcellulose PF  1 drop Both Eyes Q6H    Vitamin D  2,000 Units Oral Daily    escitalopram  20 mg Oral Daily    ferrous sulfate  325 mg Oral Daily with breakfast    trospium  20 mg Oral BID AC    [Held by provider] fluticasone  1 spray Each Nostril Daily    gabapentin  300 mg Oral TID    guaiFENesin  600 mg Oral Daily    lactobacillus   Oral BID    [Held by provider] cetirizine  5 mg Oral Daily    magnesium hydroxide  30 mL Oral Nightly    metoprolol succinate  25 mg Oral Daily    pantoprazole  40 mg Oral BID    QUEtiapine  100 mg Oral Nightly    atorvastatin  40 mg Oral Daily    traZODone  100 mg Oral Nightly    sodium chloride flush  5-40 mL IntraVENous 2 times per day    heparin (porcine)  5,000 Units SubCUTAneous 3 times per day    meropenem  1,000 mg IntraVENous Q8H    calcium carbonate  600 mg Oral BID     Continuous Infusions:    sodium chloride       PRN Medicationsketorolac, 15 mg, Q6H PRN  oxyCODONE-acetaminophen, 1 tablet, Q12H PRN  benzonatate, 100 mg, TID PRN  clonazePAM, 0.5 mg, TID PRN  albuterol, 2.5 mg, Q6H PRN  aluminum & magnesium hydroxide-simethicone, 5 mL, PRN  polyethylene glycol, 17 g, Daily PRN  sodium chloride flush, 5-40 mL, PRN  sodium chloride, , PRN  ondansetron, 4 mg, Q8H PRN   Or  ondansetron, 4 mg, Q6H PRN  acetaminophen, 650 mg, Q6H PRN   Or  acetaminophen, 650 mg, Q6H PRN      Diagnostic Labs:  CBC:   Recent Labs     02/05/23 0257 02/06/23 0430 02/07/23  0456   WBC 3.1* 2.7* 3.4*   RBC 4.08 3.92* 4.29   HGB 12.1 11.8* 12.7   HCT 38.5 38.8 40.8   MCV 94.4 99.0 95.1   RDW 14.8* 14.7* 14.8*   PLT See Reflexed IPF Result 127* 165     BMP:   Recent Labs     02/05/23 0257 02/06/23  0430 02/07/23  0456   * 136 136   K 4.3 4.2 4.3    104 101   CO2 25 24 24   BUN 10 10 12   CREATININE 0.88 0.84 0.79     BNP: No results for input(s): BNP in the last 72 hours. PT/INR: No results for input(s): PROTIME, INR in the last 72 hours. APTT: No results for input(s): APTT in the last 72 hours. CARDIAC ENZYMES: No results for input(s): CKMB, CKMBINDEX, TROPONINI in the last 72 hours.     Invalid input(s): CKTOTAL;3  FASTING LIPID PANEL:  Lab Results   Component Value Date    CHOL 203 (H) 12/14/2020    HDL 43 12/14/2020    TRIG 268 (H) 12/14/2020     LIVER PROFILE: No results for input(s): AST, ALT, ALB, BILIDIR, BILITOT, ALKPHOS in the last 72 hours. MICROBIOLOGY:   Lab Results   Component Value Date/Time    CULTURE (A) 02/03/2023 01:26 PM     AEROCOCCUS URINAE >665819 CFU/ML There are no CLSI interpretive guidelines for routine susceptibility testing. Aerococcus species are reported to be susceptible to penicillin. A. urinae has also been described as susceptible to amoxicillin and nitrofurantoin (for treatment of urinary tract infections only). Imaging:    CT HEAD WO CONTRAST    Result Date: 2/3/2023  No acute intracranial abnormality. Mild parenchymal volume loss. Mild chronic microvascular disease. Results were reported to Dr. Letitia Villa by radiology results communication at 1:08 p.m. on February 3, 2023. US RENAL COMPLETE    Result Date: 2/6/2023  Unremarkable left kidney. Status post right nephrectomy     CT ABDOMEN PELVIS W IV CONTRAST Additional Contrast? None    Result Date: 2/3/2023  There is significant wall thickening noted within the ascending colon which was not present on prior examination. Finding may be related to colitis. Large arge axial hiatal hernia. status post right nephrectomy, cholecystectomy and hysterectomy. Fatty liver. Extensive stool throughout the transverse colon. XR CHEST PORTABLE    Result Date: 2/6/2023  Mildly improved aeration at the left base with mild residual atelectasis/infiltrate. CTA HEAD NECK W CONTRAST    Result Date: 2/3/2023  50% stenosis in the proximal left internal carotid artery. Mild stenosis in the proximal right internal carotid artery. Otherwise, no acute abnormality or flow-limiting stenosis in the remainder of the major arteries of the head and neck. ASSESSMENT & PLAN     This is a 68 y.o. female who presented with altered mental status and found to have UTI.       Principal Problem:    #1 Complicated UTI (urinary tract infection)  -History of ESBL and MDRO  -ID consulted. Appreciate recommendations  -ucx growing Aerococcus urinae-sensitivities pending-f/u sensitivities & ID recs  -Continue Meropenem for now. -ID recommends continuing Meropenem IV      #2 Acute Metabolic encephalopathy secondary to UTI  -Resolved     #3 Paroxysmal A. Fib  -Rate controlled with metoprolol  -Sinus rhythm on EKG with occasional PVCs  -[id number] score 4, not on any anticoagulation 2/2 h/o GIB    #4 Diastolic heart failure with preserved ejection fraction  - Last echo on 12/15/2020 showed Left ventricle is normal in size. Global left ventricular systolic function is normal. EF 56 %. Mild left ventricular hypertrophy. Grade I (mild) left ventricular diastolic dysfunction. #5 CKD stage IIIa with solitary kidney  -Baseline creatinine looks like around 1  -Monitor VERONICA's  -Avoid nephrotoxic medications     #6 Agoraphobia with anxiety and depression  -Resume home medications     #7 Intractable nausea with acute on chronic abdominal pain with hiatal hernia/chronic GERD status post prior Nissen. - Continue PPI, GI prophylaxis continue antiemetics and other supportive care     #8 Colitis  - Conservative management     #9 Chronic paraplegia   - History of critical illness myelopathy status post prior complicated hospitalization with trach and PEG in remote past with decannulation of trach, removal of PEG without further issues, does use wheelchair for mobilization.    - Continue fall precautions     DVT ppx: Heparin SQ  GI ppx: Protonix     PT/OT: Consulted  Discharge Planning: Home pending antibiotic adjustment based on culture results    Donette Lesches, MD  Internal Medicine Resident, PGY-1  [facility];  [place], [place]  2/7/2023, 8:34 AM

## 2023-02-07 NOTE — PROGRESS NOTES
Approx 1100 writer saw Yesenia Weiss OT in dept. Informed pateint has not yet been seen for OT eval ordered 2/5. She then texed the pereson doing evals.

## 2023-02-07 NOTE — PLAN OF CARE
Problem: Discharge Planning  Goal: Discharge to home or other facility with appropriate resources  2/7/2023 1859 by Gillian Rivera RN  Outcome: Progressing  2/7/2023 0614 by Paz Rivera RN  Outcome: Progressing     Problem: Safety - Adult  Goal: Free from fall injury  2/7/2023 1859 by Gillian Rivera RN  Outcome: Progressing  2/7/2023 0614 by Paz Rivera RN  Outcome: Progressing     Problem: ABCDS Injury Assessment  Goal: Absence of physical injury  2/7/2023 1859 by Gillian Rivera RN  Outcome: Progressing  2/7/2023 0614 by Paz Rivera RN  Outcome: Progressing     Problem: Skin/Tissue Integrity  Goal: Absence of new skin breakdown  Description: 1. Monitor for areas of redness and/or skin breakdown  2. Assess vascular access sites hourly  3. Every 4-6 hours minimum:  Change oxygen saturation probe site  4. Every 4-6 hours:  If on nasal continuous positive airway pressure, respiratory therapy assess nares and determine need for appliance change or resting period.   2/7/2023 1859 by Gillian Rivera RN  Outcome: Progressing  2/7/2023 0614 by Paz Rivera RN  Outcome: Progressing     Problem: Neurosensory - Adult  Goal: Achieves stable or improved neurological status  2/7/2023 1859 by Gillian Rivera RN  Outcome: Progressing  2/7/2023 0614 by Paz Rivera RN  Outcome: Progressing     Problem: Cardiovascular - Adult  Goal: Maintains optimal cardiac output and hemodynamic stability  2/7/2023 1859 by Gillian Rivera RN  Outcome: Progressing  2/7/2023 0614 by Paz Rivera RN  Outcome: Progressing  Goal: Absence of cardiac dysrhythmias or at baseline  2/7/2023 1859 by Gillian Rivera RN  Outcome: Progressing  2/7/2023 0614 by Paz Rivera RN  Outcome: Progressing     Problem: Skin/Tissue Integrity - Adult  Goal: Skin integrity remains intact  2/7/2023 1859 by Gillian Rivera RN  Outcome: Progressing  2/7/2023 0614 by Paz Rivera RN  Outcome: Progressing     Problem: Musculoskeletal - Adult  Goal: Return mobility to safest level of function  2/7/2023 1859 by Lewis Rosas RN  Outcome: Progressing  2/7/2023 0614 by Lottie Edgar RN  Outcome: Progressing  Goal: Maintain proper alignment of affected body part  2/7/2023 1859 by Lewis Rosas RN  Outcome: Progressing  2/7/2023 0614 by Lottie Edgar RN  Outcome: Progressing  Goal: Return ADL status to a safe level of function  2/7/2023 1859 by Lewis Rosas RN  Outcome: Progressing  2/7/2023 0614 by Lottie Edgar RN  Outcome: Progressing     Problem: Gastrointestinal - Adult  Goal: Maintains or returns to baseline bowel function  2/7/2023 1859 by Lewis Rosas RN  Outcome: Progressing  2/7/2023 0614 by Lottie Edgar RN  Outcome: Progressing     Problem: Genitourinary - Adult  Goal: Absence of urinary retention  2/7/2023 1859 by Lewis Rosas RN  Outcome: Progressing  2/7/2023 0614 by Lottie Edgar RN  Outcome: Progressing     Problem: Infection - Adult  Goal: Absence of infection at discharge  2/7/2023 1859 by Lewis Rosas RN  Outcome: Progressing  2/7/2023 0614 by Lottie Edgar RN  Outcome: Progressing  Goal: Absence of infection during hospitalization  2/7/2023 1859 by Lewis Rosas RN  Outcome: Progressing  2/7/2023 0614 by Lottie Edgar RN  Outcome: Progressing     Problem: Pain  Goal: Verbalizes/displays adequate comfort level or baseline comfort level  2/7/2023 1859 by Lewis Rosas RN  Outcome: Progressing  2/7/2023 0614 by Lottie Edgar RN  Outcome: Progressing     Problem: Nutrition Deficit:  Goal: Optimize nutritional status  2/7/2023 1859 by Lewis Rosas RN  Outcome: Progressing  2/7/2023 0614 by Lottie Edgar RN  Outcome: Progressing

## 2023-02-07 NOTE — PROGRESS NOTES
Approx 0900 Vel from PT is in dept. Doing treatments with patients. Informed this patient has had an order since 2/5, & has not yet been evaluated. Donna Peraza then texed the PT that was doing the evaluations.

## 2023-02-07 NOTE — DISCHARGE INSTRUCTIONS
Please take medication as prescribed. Please talk with your PCP regarding de-escalation of medications as there are concerns for possible polypharmacy. Please follow-up with your primary care provider in 1 week. Return to the emergency department and/or call 911 if you have any of the following: Altered mentation, slurred speech, weakness, urinary symptoms including new incontinence, fever/chills, numbness or tingling, and/or any other new and concerning symptoms. Urology Instructions:  Intermittent Catheterization Instructions-  When have urge to urinate, try to have patient void on own first every 4-6 hours (while awake). Last attempt right before going to sleep, first attempt first thing in the morning. Bladder scan (if available) OR Straight cath using sterile technique for residual urine not able to eliminate on her own. If urine catheter residual is < 100 ml twice in a row, patient can discontinue intermittent cathing. If she does not have urge to urinate, should cath every 4-6 hours while awake and record volumes. Take home a urinal or graduated cylinder from hospital.Call Dr. Ryan Norwood office for follow up in 2-3 weeks.

## 2023-02-07 NOTE — PROGRESS NOTES
Pt stated being anxious about medication changes made by Internal Medicine. Son of pt contacted writer about not wanted medication to be changed per his mothers request. Writer contacted the resident, Elena Davis, about pt's request. Resident informed writer attending will come face-to-face with patient.

## 2023-02-08 VITALS
BODY MASS INDEX: 33.31 KG/M2 | DIASTOLIC BLOOD PRESSURE: 53 MMHG | HEIGHT: 62 IN | HEART RATE: 64 BPM | TEMPERATURE: 98.1 F | SYSTOLIC BLOOD PRESSURE: 96 MMHG | OXYGEN SATURATION: 91 % | RESPIRATION RATE: 19 BRPM | WEIGHT: 181 LBS

## 2023-02-08 LAB
ABSOLUTE EOS #: 0.37 K/UL (ref 0–0.44)
ABSOLUTE IMMATURE GRANULOCYTE: 0.09 K/UL (ref 0–0.3)
ABSOLUTE LYMPH #: 0.97 K/UL (ref 1.1–3.7)
ABSOLUTE MONO #: 0.64 K/UL (ref 0.1–1.2)
ANION GAP SERPL CALCULATED.3IONS-SCNC: 11 MMOL/L (ref 9–17)
BASOPHILS # BLD: 1 % (ref 0–2)
BASOPHILS ABSOLUTE: 0.03 K/UL (ref 0–0.2)
BUN SERPL-MCNC: 14 MG/DL (ref 8–23)
CALCIUM SERPL-MCNC: 9.2 MG/DL (ref 8.6–10.4)
CHLORIDE SERPL-SCNC: 101 MMOL/L (ref 98–107)
CO2 SERPL-SCNC: 24 MMOL/L (ref 20–31)
CREAT SERPL-MCNC: 0.89 MG/DL (ref 0.5–0.9)
EOSINOPHILS RELATIVE PERCENT: 10 % (ref 1–4)
GFR SERPL CREATININE-BSD FRML MDRD: >60 ML/MIN/1.73M2
GLUCOSE BLD-MCNC: 109 MG/DL (ref 65–105)
GLUCOSE SERPL-MCNC: 97 MG/DL (ref 70–99)
HCT VFR BLD AUTO: 38.4 % (ref 36.3–47.1)
HGB BLD-MCNC: 11.8 G/DL (ref 11.9–15.1)
IMMATURE GRANULOCYTES: 2 %
LYMPHOCYTES # BLD: 25 % (ref 24–43)
MCH RBC QN AUTO: 29.4 PG (ref 25.2–33.5)
MCHC RBC AUTO-ENTMCNC: 30.7 G/DL (ref 28.4–34.8)
MCV RBC AUTO: 95.8 FL (ref 82.6–102.9)
MICROORGANISM SPEC CULT: NORMAL
MICROORGANISM SPEC CULT: NORMAL
MONOCYTES # BLD: 17 % (ref 3–12)
NRBC AUTOMATED: 0 PER 100 WBC
PDW BLD-RTO: 14.5 % (ref 11.8–14.4)
PLATELET # BLD AUTO: 161 K/UL (ref 138–453)
PMV BLD AUTO: 10.9 FL (ref 8.1–13.5)
POTASSIUM SERPL-SCNC: 4.6 MMOL/L (ref 3.7–5.3)
RBC # BLD: 4.01 M/UL (ref 3.95–5.11)
RBC # BLD: ABNORMAL 10*6/UL
SEG NEUTROPHILS: 45 % (ref 36–65)
SEGMENTED NEUTROPHILS ABSOLUTE COUNT: 1.79 K/UL (ref 1.5–8.1)
SERVICE CMNT-IMP: NORMAL
SERVICE CMNT-IMP: NORMAL
SODIUM SERPL-SCNC: 136 MMOL/L (ref 135–144)
SPECIMEN DESCRIPTION: NORMAL
SPECIMEN DESCRIPTION: NORMAL
TROPONIN I SERPL DL<=0.01 NG/ML-MCNC: 19 NG/L (ref 0–14)
WBC # BLD AUTO: 3.9 K/UL (ref 3.5–11.3)

## 2023-02-08 PROCEDURE — 85025 COMPLETE CBC W/AUTO DIFF WBC: CPT

## 2023-02-08 PROCEDURE — 6370000000 HC RX 637 (ALT 250 FOR IP): Performed by: STUDENT IN AN ORGANIZED HEALTH CARE EDUCATION/TRAINING PROGRAM

## 2023-02-08 PROCEDURE — 51798 US URINE CAPACITY MEASURE: CPT

## 2023-02-08 PROCEDURE — 6360000002 HC RX W HCPCS: Performed by: INTERNAL MEDICINE

## 2023-02-08 PROCEDURE — 6360000002 HC RX W HCPCS

## 2023-02-08 PROCEDURE — 93005 ELECTROCARDIOGRAM TRACING: CPT | Performed by: STUDENT IN AN ORGANIZED HEALTH CARE EDUCATION/TRAINING PROGRAM

## 2023-02-08 PROCEDURE — 84484 ASSAY OF TROPONIN QUANT: CPT

## 2023-02-08 PROCEDURE — 6370000000 HC RX 637 (ALT 250 FOR IP): Performed by: INTERNAL MEDICINE

## 2023-02-08 PROCEDURE — 6360000002 HC RX W HCPCS: Performed by: STUDENT IN AN ORGANIZED HEALTH CARE EDUCATION/TRAINING PROGRAM

## 2023-02-08 PROCEDURE — 2580000003 HC RX 258: Performed by: INTERNAL MEDICINE

## 2023-02-08 PROCEDURE — 2580000003 HC RX 258

## 2023-02-08 PROCEDURE — 36415 COLL VENOUS BLD VENIPUNCTURE: CPT

## 2023-02-08 PROCEDURE — 51701 INSERT BLADDER CATHETER: CPT

## 2023-02-08 PROCEDURE — 82947 ASSAY GLUCOSE BLOOD QUANT: CPT

## 2023-02-08 PROCEDURE — 99232 SBSQ HOSP IP/OBS MODERATE 35: CPT | Performed by: INTERNAL MEDICINE

## 2023-02-08 PROCEDURE — 6370000000 HC RX 637 (ALT 250 FOR IP)

## 2023-02-08 PROCEDURE — 80048 BASIC METABOLIC PNL TOTAL CA: CPT

## 2023-02-08 RX ORDER — AMITRIPTYLINE HYDROCHLORIDE 25 MG/1
25 TABLET, FILM COATED ORAL 3 TIMES DAILY
Status: DISCONTINUED | OUTPATIENT
Start: 2023-02-08 | End: 2023-02-08 | Stop reason: HOSPADM

## 2023-02-08 RX ORDER — KETOROLAC TROMETHAMINE 30 MG/ML
15 INJECTION, SOLUTION INTRAMUSCULAR; INTRAVENOUS ONCE
Status: COMPLETED | OUTPATIENT
Start: 2023-02-08 | End: 2023-02-08

## 2023-02-08 RX ORDER — MIDODRINE HYDROCHLORIDE 5 MG/1
5 TABLET ORAL ONCE
Status: COMPLETED | OUTPATIENT
Start: 2023-02-08 | End: 2023-02-08

## 2023-02-08 RX ORDER — TROSPIUM CHLORIDE 20 MG/1
20 TABLET, FILM COATED ORAL
Status: CANCELLED | OUTPATIENT
Start: 2023-02-08

## 2023-02-08 RX ORDER — AMITRIPTYLINE HYDROCHLORIDE 25 MG/1
25 TABLET, FILM COATED ORAL 3 TIMES DAILY
Qty: 30 TABLET | Refills: 3 | Status: SHIPPED | OUTPATIENT
Start: 2023-02-08

## 2023-02-08 RX ADMIN — PHENAZOPYRIDINE HYDROCHLORIDE 200 MG: 100 TABLET ORAL at 08:36

## 2023-02-08 RX ADMIN — HEPARIN SODIUM 5000 UNITS: 5000 INJECTION INTRAVENOUS; SUBCUTANEOUS at 05:32

## 2023-02-08 RX ADMIN — FERROUS SULFATE TAB EC 325 MG (65 MG FE EQUIVALENT) 325 MG: 325 (65 FE) TABLET DELAYED RESPONSE at 08:36

## 2023-02-08 RX ADMIN — KETOROLAC TROMETHAMINE 15 MG: 30 INJECTION, SOLUTION INTRAMUSCULAR; INTRAVENOUS at 15:04

## 2023-02-08 RX ADMIN — TAMSULOSIN HYDROCHLORIDE 0.4 MG: 0.4 CAPSULE ORAL at 08:35

## 2023-02-08 RX ADMIN — ONDANSETRON 4 MG: 4 TABLET, ORALLY DISINTEGRATING ORAL at 12:38

## 2023-02-08 RX ADMIN — PANTOPRAZOLE SODIUM 40 MG: 40 TABLET, DELAYED RELEASE ORAL at 08:35

## 2023-02-08 RX ADMIN — DESMOPRESSIN ACETATE 40 MG: 0.2 TABLET ORAL at 08:36

## 2023-02-08 RX ADMIN — METOPROLOL SUCCINATE 25 MG: 25 TABLET, FILM COATED, EXTENDED RELEASE ORAL at 08:36

## 2023-02-08 RX ADMIN — ESCITALOPRAM OXALATE 20 MG: 10 TABLET ORAL at 08:36

## 2023-02-08 RX ADMIN — MEROPENEM 1000 MG: 1 INJECTION, POWDER, FOR SOLUTION INTRAVENOUS at 12:35

## 2023-02-08 RX ADMIN — POLYETHYLENE GLYCOL 3350 17 G: 17 POWDER, FOR SOLUTION ORAL at 08:46

## 2023-02-08 RX ADMIN — Medication 1 CAPSULE: at 08:36

## 2023-02-08 RX ADMIN — TROSPIUM CHLORIDE 20 MG: 20 TABLET, FILM COATED ORAL at 08:37

## 2023-02-08 RX ADMIN — CARBOXYMETHYLCELLULOSE SODIUM 1 DROP: 10 GEL OPHTHALMIC at 10:46

## 2023-02-08 RX ADMIN — HEPARIN SODIUM 5000 UNITS: 5000 INJECTION INTRAVENOUS; SUBCUTANEOUS at 14:30

## 2023-02-08 RX ADMIN — MEROPENEM 1000 MG: 1 INJECTION, POWDER, FOR SOLUTION INTRAVENOUS at 05:03

## 2023-02-08 RX ADMIN — BUSPIRONE HYDROCHLORIDE 10 MG: 10 TABLET ORAL at 08:36

## 2023-02-08 RX ADMIN — PHENAZOPYRIDINE HYDROCHLORIDE 200 MG: 100 TABLET ORAL at 12:36

## 2023-02-08 RX ADMIN — AMITRIPTYLINE HYDROCHLORIDE 25 MG: 25 TABLET, FILM COATED ORAL at 15:02

## 2023-02-08 RX ADMIN — MIDODRINE HYDROCHLORIDE 5 MG: 5 TABLET ORAL at 15:02

## 2023-02-08 RX ADMIN — CARBOXYMETHYLCELLULOSE SODIUM 1 DROP: 10 GEL OPHTHALMIC at 15:03

## 2023-02-08 RX ADMIN — GABAPENTIN 300 MG: 300 CAPSULE ORAL at 14:33

## 2023-02-08 RX ADMIN — Medication 600 MG: at 08:36

## 2023-02-08 RX ADMIN — SODIUM CHLORIDE, PRESERVATIVE FREE 10 ML: 5 INJECTION INTRAVENOUS at 08:37

## 2023-02-08 RX ADMIN — Medication 2000 UNITS: at 08:35

## 2023-02-08 RX ADMIN — GUAIFENESIN 600 MG: 600 TABLET, EXTENDED RELEASE ORAL at 08:36

## 2023-02-08 RX ADMIN — OXYCODONE HYDROCHLORIDE AND ACETAMINOPHEN 1 TABLET: 5; 325 TABLET ORAL at 05:50

## 2023-02-08 RX ADMIN — CLONAZEPAM 0.5 MG: 0.5 TABLET ORAL at 05:50

## 2023-02-08 RX ADMIN — GABAPENTIN 300 MG: 300 CAPSULE ORAL at 08:36

## 2023-02-08 ASSESSMENT — PAIN DESCRIPTION - LOCATION: LOCATION: KNEE;ABDOMEN

## 2023-02-08 ASSESSMENT — PAIN DESCRIPTION - FREQUENCY: FREQUENCY: CONTINUOUS

## 2023-02-08 ASSESSMENT — PAIN SCALES - GENERAL
PAINLEVEL_OUTOF10: 7
PAINLEVEL_OUTOF10: 6
PAINLEVEL_OUTOF10: 7
PAINLEVEL_OUTOF10: 7

## 2023-02-08 ASSESSMENT — PAIN - FUNCTIONAL ASSESSMENT: PAIN_FUNCTIONAL_ASSESSMENT: PREVENTS OR INTERFERES SOME ACTIVE ACTIVITIES AND ADLS

## 2023-02-08 ASSESSMENT — PAIN DESCRIPTION - ONSET: ONSET: ON-GOING

## 2023-02-08 ASSESSMENT — PAIN DESCRIPTION - PAIN TYPE: TYPE: CHRONIC PAIN

## 2023-02-08 ASSESSMENT — PAIN DESCRIPTION - ORIENTATION: ORIENTATION: LEFT;RIGHT

## 2023-02-08 ASSESSMENT — PAIN DESCRIPTION - DESCRIPTORS: DESCRIPTORS: ACHING;SHARP

## 2023-02-08 NOTE — PROGRESS NOTES
[facility]  Internal Medicine Teaching Residency Program  Inpatient Daily Progress Note  ______________________________________________________________________________    Patient: Tonya Cervantes  YOB: 1945   [id number]    Acct: [de-identified]     Room: [address] [place] [address]  Admit date: 2/3/2023  Today's date: 02/08/23  Number of days in the hospital: 5    SUBJECTIVE   Admitting Diagnosis: <principal problem not specified>  CC: UTI    Pt examined at bedside. Chart & results reviewed. No acute events reported overnight. Patient is alert and oriented, denies any new complaints. Patient had urinary retention overnight, bladder scan done showed around 960 mL. Patient was straight cathed. Patient is very anxious about her medications, she wants them very specifically as she was taking and very anxious about discontinued amitriptyline. Explained that the medication could cause urinary retention. Patient expressed understanding. Hemodynamically stable. On room air. Total output around 1.6 L. BRIEF HISTORY     The patient is a pleasant 68 y.o. female with a past medical history significant for recurrent UTIs, recent pneumonia and recent COVID diagnosis. Patient also has a past medical history of CKD stage IIIa with solitary kidney, paroxysmal A. fib, COPD, falls, myelopathy with persistent lower extremity weakness, chronic abdominal pain. presented from nursing home after she was found altered and had slurred speech and some weakness. Stroke alert were announced upon arrival of the patient because of the neurological deficit CT head and CTA were unremarkable. Patient reported that every time she has the same symptoms he is found to have UTI that is why he was sent to the hospital.   In the ED she was found to have UTI and culture sent.     OBJECTIVE     Vital Signs:  BP (!) 103/49   Pulse 63   Temp 98.2 °F (36.8 °C) (Oral)   Resp 18   Ht 5' 2\" (1.575 m) Wt 181 lb (82.1 kg)   SpO2 95%   BMI 33.11 kg/m²     Temp (24hrs), Av.3 °F (36.8 °C), Min:98 °F (36.7 °C), Max:98.7 °F (37.1 °C)    In: 1320   Out: 500 [Urine:500]    Physical Exam:  Constitutional: This is a well developed, well nourished, 30-34.9 - Obesity Grade I 68y.o. year old female who is alert, oriented, cooperative and in no apparent distress. Head:normocephalic and atraumatic. EENT:  PERRLA. No conjunctival injections. Septum was midline, mucosa was without erythema, exudates or cobblestoning. No thrush was noted. Neck: Supple without thyromegaly. No elevated JVP. Trachea was midline. Respiratory: Chest was symmetrical without dullness to percussion. Breath sounds bilaterally were clear to auscultation. There were no wheezes, rhonchi or rales. There is no intercostal retraction or use of accessory muscles. No egophony noted. Cardiovascular: Regular without murmur, clicks, gallops or rubs. Abdomen: Slightly rounded and soft without organomegaly. No rebound, rigidity or guarding was appreciated. Lymphatic: No lymphadenopathy. Musculoskeletal: Normal curvature of the spine. No gross muscle weakness. Extremities:  No lower extremity edema, ulcerations, tenderness, varicosities or erythema. Muscle size, tone and strength are normal.  No involuntary movements are noted. Skin:  Warm and dry. Good color, turgor and pigmentation. No lesions or scars.   No cyanosis or clubbing  Neurological/Psychiatric: The patient's general behavior, level of consciousness, thought content and emotional status is normal.        Medications:  Scheduled Medications:    traZODone  100 mg Oral Nightly    polyethylene glycol  17 g Oral Daily    phenazopyridine  200 mg Oral TID WC    tamsulosin  0.4 mg Oral Daily    busPIRone  10 mg Oral BID    carboxymethylcellulose PF  1 drop Both Eyes Q6H    Vitamin D  2,000 Units Oral Daily    escitalopram  20 mg Oral Daily    ferrous sulfate  325 mg Oral Daily with breakfast    trospium  20 mg Oral BID AC    [Held by provider] fluticasone  1 spray Each Nostril Daily    gabapentin  300 mg Oral TID    guaiFENesin  600 mg Oral Daily    lactobacillus   Oral BID    [Held by provider] cetirizine  5 mg Oral Daily    magnesium hydroxide  30 mL Oral Nightly    metoprolol succinate  25 mg Oral Daily    pantoprazole  40 mg Oral BID    QUEtiapine  100 mg Oral Nightly    atorvastatin  40 mg Oral Daily    sodium chloride flush  5-40 mL IntraVENous 2 times per day    heparin (porcine)  5,000 Units SubCUTAneous 3 times per day    meropenem  1,000 mg IntraVENous Q8H    calcium carbonate  600 mg Oral BID     Continuous Infusions:    sodium chloride       PRN MedicationsBaclofen, 5 mg, TID PRN  ketorolac, 15 mg, Q6H PRN  oxyCODONE-acetaminophen, 1 tablet, Q12H PRN  benzonatate, 100 mg, TID PRN  clonazePAM, 0.5 mg, TID PRN  albuterol, 2.5 mg, Q6H PRN  aluminum & magnesium hydroxide-simethicone, 5 mL, PRN  polyethylene glycol, 17 g, Daily PRN  sodium chloride flush, 5-40 mL, PRN  sodium chloride, , PRN  ondansetron, 4 mg, Q8H PRN   Or  ondansetron, 4 mg, Q6H PRN  acetaminophen, 650 mg, Q6H PRN   Or  acetaminophen, 650 mg, Q6H PRN        Diagnostic Labs:  CBC:   Recent Labs     02/06/23  0430 02/07/23  0456   WBC 2.7* 3.4*   RBC 3.92* 4.29   HGB 11.8* 12.7   HCT 38.8 40.8   MCV 99.0 95.1   RDW 14.7* 14.8*   * 165     BMP:   Recent Labs     02/06/23  0430 02/07/23  0456    136   K 4.2 4.3    101   CO2 24 24   BUN 10 12   CREATININE 0.84 0.79     BNP: No results for input(s): BNP in the last 72 hours. PT/INR: No results for input(s): PROTIME, INR in the last 72 hours. APTT: No results for input(s): APTT in the last 72 hours. CARDIAC ENZYMES: No results for input(s): CKMB, CKMBINDEX, TROPONINI in the last 72 hours.     Invalid input(s): CKTOTAL;3  FASTING LIPID PANEL:  Lab Results   Component Value Date    CHOL 203 (H) 12/14/2020    HDL 43 12/14/2020    TRIG 268 (H) 12/14/2020 LIVER PROFILE: No results for input(s): AST, ALT, ALB, BILIDIR, BILITOT, ALKPHOS in the last 72 hours. MICROBIOLOGY:   Lab Results   Component Value Date/Time    CULTURE (A) 02/03/2023 01:26 PM     AEROCOCCUS URINAE >529090 CFU/ML There are no CLSI interpretive guidelines for routine susceptibility testing. Aerococcus species are reported to be susceptible to penicillin. A. urinae has also been described as susceptible to amoxicillin and nitrofurantoin (for treatment of urinary tract infections only). Imaging:    CT HEAD WO CONTRAST    Result Date: 2/3/2023  No acute intracranial abnormality. Mild parenchymal volume loss. Mild chronic microvascular disease. Results were reported to Dr. Myron Pedersen by radiology results communication at 1:08 p.m. on February 3, 2023. US RENAL COMPLETE    Result Date: 2/6/2023  Unremarkable left kidney. Status post right nephrectomy     CT ABDOMEN PELVIS W IV CONTRAST Additional Contrast? None    Result Date: 2/3/2023  There is significant wall thickening noted within the ascending colon which was not present on prior examination. Finding may be related to colitis. Large arge axial hiatal hernia. status post right nephrectomy, cholecystectomy and hysterectomy. Fatty liver. Extensive stool throughout the transverse colon. XR CHEST PORTABLE    Result Date: 2/6/2023  Mildly improved aeration at the left base with mild residual atelectasis/infiltrate. CTA HEAD NECK W CONTRAST    Result Date: 2/3/2023  50% stenosis in the proximal left internal carotid artery. Mild stenosis in the proximal right internal carotid artery. Otherwise, no acute abnormality or flow-limiting stenosis in the remainder of the major arteries of the head and neck. ASSESSMENT & PLAN      This is a 68 y.o. female who presented with altered mental status and found to have UTI.       Principal Problem:     #1 Complicated UTI (urinary tract infection)  -History of ESBL and MDRO  -ID consulted. Appreciate recommendations  -ucx growing Aerococcus urinae-sensitivities pending-f/u sensitivities & ID recs  -Continue Meropenem for now. -ID recommends continuing Meropenem IV      #2 Acute Metabolic encephalopathy secondary to UTI  -Resolved     #3 Paroxysmal A. Fib  -Rate controlled with metoprolol  -Sinus rhythm on EKG with occasional PVCs  -[id number] score 4, not on any anticoagulation 2/2 h/o GIB     #4 Diastolic heart failure with preserved ejection fraction  - Last echo on 12/15/2020 showed Left ventricle is normal in size. Global left ventricular systolic function is normal. EF 56 %. Mild left ventricular hypertrophy. Grade I (mild) left ventricular diastolic dysfunction. #5 CKD stage IIIa with solitary kidney  -Baseline creatinine looks like around 1  -Monitor VERONICA's  -Avoid nephrotoxic medications     #6 Agoraphobia with anxiety and depression  -Resume home medications     #7 Intractable nausea with acute on chronic abdominal pain with hiatal hernia/chronic GERD status post prior Nissen. - Continue PPI, GI prophylaxis continue antiemetics and other supportive care     #8 Colitis  - Conservative management     #9 Chronic paraplegia   - History of critical illness myelopathy status post prior complicated hospitalization with trach and PEG in remote past with decannulation of trach, removal of PEG without further issues, does use wheelchair for mobilization.    - Continue fall precautions     DVT ppx: Heparin SQ  GI ppx: Protonix     PT/OT: Consulted  Discharge Planning: Home pending antibiotic adjustment based on culture results    Sarah Collazo MD  Internal Medicine Resident, PGY-1  [address], [place]  2/8/2023, 3:16 AM

## 2023-02-08 NOTE — PROGRESS NOTES
[facility] - Progress Note    Today's Date and Time: 2/8/2023, 10:39 AM    Impression :   Altered  mental status  Prior COVID 19 Confirmed Infection 1/1/23  Vaccination status:  Patient received 3 doses of Lollie Mulligan in 2021. Overdue for Covid booster since 2/19/22. Recent infection will count as a 4th dose of vaccine  She will still need a Bivalent booster in April 2023  Acute hypoxic respiratory distress  Elevated CRP  Paroxysmal Afib  HTN  GERD  COPD  Solitary left kidney  Chronic abdominal pain  Paraplegia  Wheel-chair bound  Chronic gastritis  Anxiety  Hx MDRO and ESBL  PCN allergy    Recommendations:   Continue Meropenem 1 gm IV q 6 hr for Aerococcus in the urine culture until discharge. Can stop meropenem at time of discharge  Patient is allergic to Ampicillin   No available oral alternatives    Medical Decision Making/Summary/Discussion:2/8/2023       Infection Control Recommendations   [place] Precautions  Contact Isolation  ESBL, MDRO  Antimicrobial Stewardship Recommendations     Simplification of therapy  Targeted therapy    Coordination of Outpatient Care:   Estimated Length of IV antimicrobials: TBD  Patient will need Midline Catheter Insertion: TBD  Patient will need PICC line Insertion:TBD   Patient will need: Home IV , [place],  SNF,  LTAC: TBD  Patient will need outpatient wound care:No    Chief complaint/reason for consultation:   Altered mentation  Suspected complicated UTI      History of Present Illness:   Magdaleno Beltran is a 68y.o.-year-old  female who was initially admitted on 2/3/2023. Patient seen at the request of Janet Gupta. INITIAL HISTORY:    Patient known to our service from prior admission to [facility] on 1-1-23 when she was treated for a fall and for Covid 19. She received Paxlovid.      She has a complicated past medical Hx, including:  Paroxysmal Afib  Essential HTN  GERD  COPD  Solitary left kidney  Chronic abdominal pain  Paraplegia  Wheel-chair bound  Chronic gastritis  Anxiety  Hx MDRO and ESBL with prior ESBL + UTIs  PCN allergy    On current admission 2-3-23 patient was transferred from SNF with slurred speech and altered mentation. She underwent stroke alert evaluation which was negative. Patient in the past has shown altered mentation when a UTI affects her. Cultures taken and patient started on Meropenem. CURRENT EVALUATION : 2/8/2023    /64   Pulse 66   Temp 98.1 °F (36.7 °C) (Oral)   Resp 15   Ht 5' 2\" (1.575 m)   Wt 181 lb (82.1 kg)   SpO2 95%   BMI 33.11 kg/m²      Afebrile  VS stable    Patient feels better  No complaints  No new issues per RN    Medications reviewed  On nasal 02 @ 2 L/min  RR 12-19-->15  02 sat: 96-->95    Dysuria better than yesterday. Has problems with urinary retention  Bladder scan this morning showed approximately 840 mL. She has residual suprapubic pain  She is constipated  She denies headaches, chills, diaphoresis, dyspnea, chest pain. Medications reviewed  On meropenem  Urine culture with Aerococcus  Allergic to penicillin, ampicillin    CT abdomen 2-3-23 with retained stool and mild thickening of wall of ascending colon. Solitary kidney. Labs, X rays reviewed: 2/8/2023    BUN: 10 --> 10 --> 14  Cr: 0.84 -->0.79-->0.89  Na 136    WBC: 2.7 --> 3.4-->3.9  Hb: 11.8 --> 12.7-->11.8  Plat: 147 --> 127 --> 161    Cultures:  Urine:  2-3-23: AEROCOCCUS URINAE >[id number] CFU/ML  Blood:  2-3-23: No growth x 2  Sputum :  1-1-23: Normal teresa  Wound:      Discussed with patient, RN, IM, son on the phone. I have personally reviewed the past medical history, past surgical history, medications, social history, and family history, and I have updated the database accordingly.   Past Medical History:     Past Medical History:   Diagnosis Date    A-fib [facility])     Acquired absence of kidney     Adult hypertrophic pyloric stenosis     Agoraphobia with panic disorder Agoraphobia without history of panic disorder     Allergic rhinitis     Anemia, unspecified     Anxiety     Anxiety disorder     Arthritis     Atrial fibrillation (HCC)     Back pain     Bronchitis     Caffeine use     8 coffee / day    Cardiomegaly     Carpal tunnel syndrome     Cataracts, bilateral     Centriacinar emphysema (HCC)     Chronic kidney disease, stage III (moderate) (HCC)     Chronic pain     Constipation     Constipation     COPD (chronic obstructive pulmonary disease) (HCC)     Emphysema    Cystitis with hematuria     Depression     Diaphragmatic hernia without obstruction or gangrene     Diarrhea     Difficulty walking     Dry eye syndrome of unspecified lacrimal gland     Esophageal obstruction     FOM (frequency of micturition)     Foot drop, right foot     Gastro-esophageal reflux disease with esophagitis, with bleeding     GERD (gastroesophageal reflux disease)     H/O gastric ulcer     HTN (hypertension)     Hyperlipidemia     Hypertension, essential     Klebsiella pneumoniae (k. pneumoniae) as the cause of diseases classified elsewhere     Major depression, recurrent (HCC)     Mitral valve prolapse     Mixed hyperlipidemia     Mumps     Muscle weakness (generalized)     MVP (mitral valve prolapse)     Dr. Shabbir Mahmood in May 2019    Old myocardial infarction     Personal history of disease of digestive system     Psychophysiological insomnia     Recurrent UTI     Dr. Dewey Anthony    S/P PICC central line placement 08/05/2022    \"no longer present\" per Elicia Sevilla (Nurse) at [address] [place] [address]. Sepsis ([address]     Sinusitis     Tracheostomy in place [facility])     \"No longer present\" per Elicia Sevilla (Nurse) at [facility] [place].     Ulcerative esophagitis     Urinary tract infection, site not specified     Uses wheelchair     Wears glasses     Wellness examination     Dr. Jayden Villegas seen in Jan 2020       Past Surgical  History:     Past Surgical History:   Procedure Laterality Date    APPENDECTOMY CARPAL TUNNEL RELEASE      CHOLECYSTECTOMY, LAPAROSCOPIC N/A 05/22/2020    XI LAPAROSCOPIC ROBOTIC CHOLECYSTECTOMY, PYLOROPLASTY performed by Lakeshia Sanchez MD at [address] [facility]      COLONOSCOPY N/A 1/28/2022    COLONOSCOPY POLYPECTOMY HOT performed by aTnner Fenton MD at [address]      ERCP  05/21/2020    with stent insertion, balloon dilation, sphinctereotomy    ERCP  05/21/2020    ** CASE IN OR WITY GI STAFF** ERCP STENT INSERTION performed by Tanner Fenton MD at [facility]    ERCP  05/21/2020    ** CASE IN OR WITH GI STAFF**ERCP SPHINCTER/PAPILLOTOMY performed by Tanner Fenton MD at [facility]    ERCP  05/21/2020    ** CASE IN OR WITH GI STAFF**ERCP DILATION BALLOON performed by Tanner Fenton MD at [facility]    ERCP N/A 2/8/2021    ERCP STENT REMOVAL performed by Oz Maguire MD at [facility]    ERCP  2/8/2021    ERCP STONE REMOVAL performed by Oz Maguire MD at [address] [place] [address]    GASTRIC FUNDOPLICATION N/A 12/70/4636    XI LAPAROSCOPIC ROBOTIC [facility], CHERYL FUNDOPLICATION performed by Lakeshia Sanchez MD at [address] 03/27/2020    EGD PEG TUBE PLACEMENT performed by Lakeshia Sanchez MD at [address] N/A 2/8/2021    **CASE IN O.R. W/ GI STAFF - EGD WITH REMOVAL PEG TUBE performed by Oz Maguire MD at [facility]    HAND SURGERY      [facility] CATH POWER PICC TRIPLE  03/04/2020         HERNIA REPAIR      Hiatal hernia    HYSTERECTOMY (CERVIX STATUS UNKNOWN)      Abdominal    IR NONTUNNELED VASCULAR CATHETER  5/10/2022    IR NONTUNNELED VASCULAR CATHETER 5/10/2022 Philipp Goodpasture, MD [facility]    JOINT REPLACEMENT Right     right hip    OVARY REMOVAL      RHINOPLASTY      TONSILLECTOMY      TOTAL HIP ARTHROPLASTY      TOTAL NEPHRECTOMY      atrophic from infections, age 13    TRACHEOSTOMY N/A 03/27/2020    **ADD ON **WANTS 10:00AM **TRACHEOTOMY performed by Leonarda Marrufo MD at [facility] N/A 04/02/2020    TRACHEOTOMY EXCHANGE performed by Leonarda Marrufo MD at [facility] 03/17/2020    BEDSIDE EGD ESOPHAGOGASTRODUODENOSCOPY (ICU) performed by Leonarda Marrufo MD at [address] [place] [address],[facility] 05/18/2020    EGD BIOPSY performed by Paul Sauceda MD at [address] [place] [address],[facility]  05/18/2020    EGD DILATION BALLOON performed by Paul Sauceda MD at [address] [place] [address],[facility] N/A 07/06/2020    ** CASE IN O.R. WITH GI STAFF** EGD ESOPHAGOGASTRODUODENOSCOPY performed by Ale Davison MD at [address] [place] [address],[facility] 11/09/2020    EGD DIAGNOSTIC ONLY performed by Adam Stuart MD at [address] [place] [address],[facility]  02/08/2021    - EGD WITH REMOVAL PEG TUBE,ERCP STENT REMOVAL,ERCP STONE REMOVAL    UPPER GASTROINTESTINAL ENDOSCOPY N/A 11/24/2021    EGD BIOPSY performed by Adam Stuart MD at [address] [place] [address],[facility] N/A 9/8/2022    EGD BIOPSY performed by Paul Sauceda MD at [address] [place] [address] N/A 10/11/2022    EGD BIOPSY performed by Paul Sauceda MD at [facility]       Medications:      traZODone  100 mg Oral Nightly    polyethylene glycol  17 g Oral Daily    phenazopyridine  200 mg Oral TID WC    tamsulosin  0.4 mg Oral Daily    busPIRone  10 mg Oral BID    carboxymethylcellulose PF  1 drop Both Eyes Q6H    Vitamin D  2,000 Units Oral Daily    escitalopram  20 mg Oral Daily    ferrous sulfate  325 mg Oral Daily with breakfast    [Held by provider] trospium  20 mg Oral BID AC    [Held by provider] fluticasone  1 spray Each Nostril Daily    gabapentin  300 mg Oral TID    guaiFENesin  600 mg Oral Daily    lactobacillus   Oral BID    [Held by provider] cetirizine  5 mg Oral Daily    magnesium hydroxide  30 mL Oral Nightly    metoprolol succinate  25 mg Oral Daily    pantoprazole  40 mg Oral BID    QUEtiapine  100 mg Oral Nightly    atorvastatin  40 mg Oral Daily    sodium chloride flush  5-40 mL IntraVENous 2 times per day    heparin (porcine)  5,000 Units SubCUTAneous 3 times per day    meropenem  1,000 mg IntraVENous Q8H    calcium carbonate  600 mg Oral BID       Social History:     Social History     Socioeconomic History    Marital status:      Spouse name: Not on file    Number of children: 2    Years of education: Not on file    Highest education level: Not on file   Occupational History    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   Tobacco Use    Smoking status: Former     Packs/day: 1.00     Years: 50.00     Pack years: 50.00     Types: Cigarettes    Smokeless tobacco: Never    Tobacco comments:     quit 1 year ago    Vaping Use    Vaping Use: Former    Substances: Always   Substance and Sexual Activity    Alcohol use: No    Drug use: No    Sexual activity: Never   Other Topics Concern    Not on file   Social History Narrative    Not on file     Social Determinants of Health     Financial Resource Strain: Not on file   Food Insecurity: Not on file   Transportation Needs: Not on file   Physical Activity: Not on file   Stress: Not on file   Social Connections: Not on file   Intimate Partner Violence: Not on file   Housing Stability: Not on file       Family History:     Family History   Problem Relation Age of Onset    Cancer Mother         uterine    Heart Attack Mother     Heart Attack Father     Other Maternal Grandfather         foot gangrene    Other Paternal Grandmother         bleeding ulcers    Other Paternal Grandfather         lung cancer        Allergies:   Prednisone, Compazine [prochlorperazine maleate], Penicillin v potassium, and Penicillins     Review of Systems:     Unable to provide much information for ROS. Somnolent. 2/8/2023       Constitutional: No fevers or chills.  No systemic complaints  Head: No headaches  Eyes: No double vision or blurry vision. No conjunctival inflammation. ENT: No sore throat or runny nose. . No hearing loss, tinnitus or vertigo. Cardiovascular: No chest pain or palpitations. No shortness of breath. No HOLLAND  Lung: No shortness of breath or cough. No sputum production  Abdomen: No nausea, vomiting, diarrhea, or abdominal pain. Abdirashid Lasso No cramps. Genitourinary: No increased urinary frequency, or dysuria. No hematuria. No suprapubic or CVA pain  Musculoskeletal: No muscle aches or pains. No joint effusions, swelling or deformities  Hematologic: No bleeding or bruising. Neurologic: No headache, weakness, numbness, or tingling. Integument: No rash, no ulcers. Psychiatric: No depression. Endocrine: No polyuria, no polydipsia, no polyphagia. Physical Examination :   Patient Vitals for the past 8 hrs:   BP Temp Temp src Pulse Resp SpO2   02/08/23 0836 115/64 -- -- 66 -- --   02/08/23 0805 115/64 98.1 °F (36.7 °C) Oral 61 15 95 %   02/08/23 0400 (!) 122/59 98.3 °F (36.8 °C) Oral 61 15 94 %       General Appearance: Somnolent, and in no apparent distress  Head:  Normocephalic, no trauma  Eyes: Pupils equal, round, reactive to light and accommodation; extraocular movements intact; sclera anicteric; conjunctivae pink. No embolic phenomena. ENT: Oropharynx clear, without erythema, exudate, or thrush. No tenderness of sinuses. Mouth/throat: mucosa pink and moist. No lesions. Dentition in good repair. Neck:Supple, without lymphadenopathy. Thyroid normal, No bruits. Pulmonary/Chest: Clear to auscultation, without wheezes, rales, or rhonchi. No dullness to percussion. Cardiovascular: Regular rate and rhythm without murmurs, rubs, or gallops. Abdomen: Soft, non tender. Bowel sounds normal. No organomegaly. Suprapubic tenderness to palpation  All four Extremities: No cyanosis, clubbing, edema, or effusions. Neurologic: No gross sensory or motor deficits.  Patient somnolent  Skin: Warm and dry with good turgor. Signs of peripheral arterial insufficiency. No ulcerations. No open wounds. Medical Decision Making -Laboratory:   I have independently reviewed/ordered the following labs:    CBC with Differential:   Recent Labs     02/07/23 0456 02/08/23 0425   WBC 3.4* 3.9   HGB 12.7 11.8*   HCT 40.8 38.4    161   LYMPHOPCT 23* 25   MONOPCT 18* 17*       BMP:   Recent Labs     02/07/23 0456 02/08/23 0425    136   K 4.3 4.6    101   CO2 24 24   BUN 12 14   CREATININE 0.79 0.89       Hepatic Function Panel: No results for input(s): PROT, LABALBU, BILIDIR, IBILI, BILITOT, ALKPHOS, ALT, AST in the last 72 hours. No results for input(s): RPR in the last 72 hours. No results for input(s): HIV in the last 72 hours. No results for input(s): BC in the last 72 hours. Lab Results   Component Value Date/Time    MUCUS NOT REPORTED 12/18/2021 12:26 PM    RBC 4.01 02/08/2023 04:25 AM    TRICHOMONAS NOT REPORTED 12/18/2021 12:26 PM    WBC 3.9 02/08/2023 04:25 AM    YEAST NOT REPORTED 12/18/2021 12:26 PM    TURBIDITY Clear 02/03/2023 01:26 PM     Lab Results   Component Value Date/Time    CREATININE 0.89 02/08/2023 04:25 AM    GLUCOSE 97 02/08/2023 04:25 AM       Medical Decision Making-Imaging:     Renal Ultrasound 2/6/23    Impression   Unremarkable left kidney. Status post right nephrectomy               EXAMINATION:   ONE X-RAY VIEW OF THE CHEST       2/3/2023 1:21 pm       COMPARISON:   01/05/2023       HISTORY:   ORDERING SYSTEM PROVIDED HISTORY: sepsis   TECHNOLOGIST PROVIDED HISTORY:   sepsis       FINDINGS:   Heart size is stable. Mediastinal contours are unchanged. There is improved   aeration in the left base with mild residual atelectasis/infiltrate. Patulous esophagus noted. No new airspace consolidation. No pneumothorax or   pleural effusion. Impression   Mildly improved aeration at the left base with mild residual   atelectasis/infiltrate.        EXAMINATION:   CT OF THE ABDOMEN AND PELVIS WITH CONTRAST 2/3/2023 12:42 pm       TECHNIQUE:   CT of the abdomen and pelvis was performed with the administration of   intravenous contrast. Multiplanar reformatted images are provided for review. Automated exposure control, iterative reconstruction, and/or weight based   adjustment of the mA/kV was utilized to reduce the radiation dose to as low   as reasonably achievable. COMPARISON:   01/01/2023       HISTORY:   ORDERING SYSTEM PROVIDED HISTORY: febrile, abdomen mass   TECHNOLOGIST PROVIDED HISTORY:   febrile, abdomen mass       Reason for Exam: febrile, abdomen mass       FINDINGS:   LOWER CHEST: Axial hiatal hernia containing part of the stomach. Linear   atelectatic changes within the lower lobes. KIDNEYS AND URINARY TRACT: No renal calculi are identified. There is no   evidence for hydronephrosis. The ureters are of normal course and caliber. Right kidney is not visualized in the right renal fossa, unchanged. Simple   cyst within the left kidney. ORGANS: Fatty liver. Status post cholecystectomy. Visualized portions of   the liver, spleen, pancreas, gallbladder, and adrenal glands demonstrate no   acute abnormality. GI/BOWEL: No bowel obstruction. No evidence of acute appendicitis. Reported   history of abdominal mass. No discrete mass is noted within the abdomen. There is significant wall thickening noted within the ascending colon which   was not present on prior examination. Finding may be related to colitis. There is large amount of stool throughout the transverse colon. Rudolfo Screen PELVIS: The bladder and pelvic organs are unremarkable. Status   posthysterectomy. PERITONEUM/RETROPERITONEUM: No free air or free fluid is noted. No   pathologically enlarged lymphadenopathy. The vasculature do not demonstrate   acute abnormality. BONES/SOFT TISSUES: The osseous structures demonstrate no acute abnormality.    Status post right hip arthroplasty. Moderate levoscoliosis. Impression   There is significant wall thickening noted within the ascending colon which   was not present on prior examination. Finding may be related to colitis. Large arge axial hiatal hernia.       status post right nephrectomy, cholecystectomy and hysterectomy. Fatty liver. Extensive stool throughout the transverse colon. Medical Decision [id number]:       Medical Decision Making-Other:     Note:  Labs, medications, radiologic studies were reviewed with personal review of films  Large amounts of data were reviewed  Discussed with nursing Staff, Discharge planner  Infection Control and Prevention measures reviewed  All prior entries were reviewed  Administer medications as ordered  Prognosis: [address]  Discharge planning reviewed  Follow up as outpatient. Thank you for allowing us to participate in the care of this patient. Please call with questions.     Lupe Garza MD          Pager: [phone] - Office: [phone]

## 2023-02-08 NOTE — PLAN OF CARE
Problem: Discharge Planning  Goal: Discharge to home or other facility with appropriate resources  2/8/2023 0606 by Mel Beckwith RN  Outcome: Progressing  2/7/2023 1859 by Chris Farooq RN  Outcome: Progressing     Problem: Safety - Adult  Goal: Free from fall injury  2/8/2023 0606 by Mel Beckwith RN  Outcome: Progressing  2/7/2023 1859 by Chris Farooq RN  Outcome: Progressing     Problem: ABCDS Injury Assessment  Goal: Absence of physical injury  2/8/2023 0606 by Mel Beckwith RN  Outcome: Progressing  2/7/2023 1859 by Chris Farooq RN  Outcome: Progressing     Problem: Skin/Tissue Integrity  Goal: Absence of new skin breakdown  Description: 1. Monitor for areas of redness and/or skin breakdown  2. Assess vascular access sites hourly  3. Every 4-6 hours minimum:  Change oxygen saturation probe site  4. Every 4-6 hours:  If on nasal continuous positive airway pressure, respiratory therapy assess nares and determine need for appliance change or resting period.   2/8/2023 0606 by Mel Beckwith RN  Outcome: Progressing  2/7/2023 1859 by Chris Farooq RN  Outcome: Progressing     Problem: Neurosensory - Adult  Goal: Achieves stable or improved neurological status  2/8/2023 0606 by Mel Beckwith RN  Outcome: Progressing  2/7/2023 1859 by Chris Farooq RN  Outcome: Progressing     Problem: Cardiovascular - Adult  Goal: Maintains optimal cardiac output and hemodynamic stability  2/7/2023 1859 by Chris Farooq RN  Outcome: Progressing  Goal: Absence of cardiac dysrhythmias or at baseline  2/7/2023 1859 by Chris Farooq RN  Outcome: Progressing     Problem: Skin/Tissue Integrity - Adult  Goal: Skin integrity remains intact  2/7/2023 1859 by Chris Farooq RN  Outcome: Progressing     Problem: Musculoskeletal - Adult  Goal: Return mobility to safest level of function  2/7/2023 1859 by Chris Farooq RN  Outcome: Progressing  Goal: Maintain proper alignment of affected body part  2/7/2023 1859 by Oskar Raman RN  Outcome: Progressing  Goal: Return ADL status to a safe level of function  2/7/2023 1859 by Oskar Raman RN  Outcome: Progressing     Problem: Gastrointestinal - Adult  Goal: Maintains or returns to baseline bowel function  2/7/2023 1859 by Oskar Raman RN  Outcome: Progressing     Problem: Genitourinary - Adult  Goal: Absence of urinary retention  2/7/2023 1859 by Oskar Raman RN  Outcome: Progressing     Problem: Infection - Adult  Goal: Absence of infection at discharge  2/7/2023 1859 by Oskar Raman RN  Outcome: Progressing  Goal: Absence of infection during hospitalization  2/7/2023 1859 by Oskar Raman RN  Outcome: Progressing     Problem: Pain  Goal: Verbalizes/displays adequate comfort level or baseline comfort level  2/7/2023 1859 by Oskar Raman RN  Outcome: Progressing     Problem: Nutrition Deficit:  Goal: Optimize nutritional status  2/7/2023 1859 by Oskar Raman RN  Outcome: Progressing

## 2023-02-08 NOTE — PROGRESS NOTES
[facility]  Occupational Therapy Not Seen Note    DATE: 2023    NAME: Jason Tinoco  MRN: [id number]   : 1945      Patient not seen this date for Occupational Therapy due to:    Patient Declined: Pt declined participation in OT evaluation stating she plans to discharge from hospital in an hour. OT offered to assist pt in dressing in preparation for discharge if RN approves however pt states she does not have cloths to change into for return to facility. Will check back tomorrow if pt does not discharge this evening.       Electronically signed by KYRA Ivey on 2023 at 2:48 PM

## 2023-02-08 NOTE — CONSULTS
Shanel Ventura, Joon Vieyra, Kathie Richardson, & Rashi   Urology Consultation      Patient:  Oumar Bar  MRN: [id number]  YOB: 1945    CHIEF COMPLAINT:  Urinary Retention, Recurrent UTIs, Interstitial Cystitis    HISTORY OF PRESENT ILLNESS:   The patient is a 68 y.o. female who presents with altered mental status, acute UTI symptoms, history of interstitial cystitis and recurrent UTIs, along with history of urinary retention. She states she typically urinates wellat her facility and does not feel she is retainng, but here she has been straight cathed intermittently. She was straight cathed overnight and intermittently for volume of 900 and yesterday for 600. She is having Bms, but is not very ambulatory without assistance. Per patient report, she is known to Dr. Avelino Larios for Interstitial cystitis for which he placed her on Amitriptyline and patient states this helps her IC pain dramatically. I am unable to view any outpatient encounters but she was evaluated back in January by Dr. Matt Rivera co. Office. She currently complains of dysuria, pelvic pain. She denies fevers, chills. 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 Patient also has had history of OAB symptoms, mostly urinary frequency and bladder spasms. She underwent cysto in 11/2017 with Dr. Robson Wade for a CC of urinary frequency. Cystoscopy was negative for any intravesical pathology. She was told to cut back on her excessive overall fluid intake and coffee consumption to limit her frequency. She was instructed to keep a food and urinary symptom diary. She was to return in 6 months for recheck of symptoms, but patient was lost to follow up. She then apparently was seen by Toni Cohn in 2018 by Dr. Avelino Larios per patient (there is a visit documented as well) for recurrent UTI. \"      Patient's old records, notes and chart reviewed and summarized above.     Past Medical History:    Past Medical History:   Diagnosis Date    A-fib ([facility]     Acquired absence of kidney     Adult hypertrophic pyloric stenosis     Agoraphobia with panic disorder     Agoraphobia without history of panic disorder     Allergic rhinitis     Anemia, unspecified     Anxiety     Anxiety disorder     Arthritis     Atrial fibrillation (HCC)     Back pain     Bronchitis     Caffeine use     8 coffee / day    Cardiomegaly     Carpal tunnel syndrome     Cataracts, bilateral     Centriacinar emphysema (HCC)     Chronic kidney disease, stage III (moderate) (HCC)     Chronic pain     Constipation     Constipation     COPD (chronic obstructive pulmonary disease) (HCC)     Emphysema    Cystitis with hematuria     Depression     Diaphragmatic hernia without obstruction or gangrene     Diarrhea     Difficulty walking     Dry eye syndrome of unspecified lacrimal gland     Esophageal obstruction     FOM (frequency of micturition)     Foot drop, right foot     Gastro-esophageal reflux disease with esophagitis, with bleeding     GERD (gastroesophageal reflux disease)     H/O gastric ulcer     HTN (hypertension)     Hyperlipidemia     Hypertension, essential     Klebsiella pneumoniae (k. pneumoniae) as the cause of diseases classified elsewhere     Major depression, recurrent (HCC)     Mitral valve prolapse     Mixed hyperlipidemia     Mumps     Muscle weakness (generalized)     MVP (mitral valve prolapse)     Dr. Jean Marie Cordova in May 2019    Old myocardial infarction     Personal history of disease of digestive system     Psychophysiological insomnia     Recurrent UTI     Dr. Anna Almazan    S/P PICC central line placement 08/05/2022    \"no longer present\" per Rebeka Osuna (Nurse) at [address] [place] [address]. Sepsis ([facility]     Sinusitis     Tracheostomy in place [facility])     \"No longer present\" per Rebeka Osuna (Nurse) at [facility] [place].     Ulcerative esophagitis     Urinary tract infection, site not specified     Uses wheelchair     Wears glasses     Wellness examination     Dr. Juliette Rosa seen in Jan 2020       Past Surgical History:    Past Surgical History:   Procedure Laterality Date    APPENDECTOMY      CARPAL TUNNEL RELEASE      CHOLECYSTECTOMY, LAPAROSCOPIC N/A 05/22/2020    XI LAPAROSCOPIC ROBOTIC CHOLECYSTECTOMY, PYLOROPLASTY performed by Jaqueline Munoz MD at [address]      COLONOSCOPY N/A 1/28/2022    COLONOSCOPY POLYPECTOMY HOT performed by Huang Garcia MD at [facility]      ERCP  05/21/2020    with stent insertion, balloon dilation, sphinctereotomy    ERCP  05/21/2020    ** CASE IN OR WITY GI STAFF** ERCP STENT INSERTION performed by Huang Garcia MD at [facility]    ERCP  05/21/2020    ** CASE IN OR WITH GI STAFF**ERCP SPHINCTER/PAPILLOTOMY performed by Huang Garcia MD at [facility]    ERCP  05/21/2020    ** CASE IN OR WITH GI STAFF**ERCP DILATION BALLOON performed by Huang Garcia MD at [facility]    ERCP N/A 2/8/2021    ERCP STENT REMOVAL performed by Brian Wild MD at [facility]    ERCP  2/8/2021    ERCP STONE REMOVAL performed by Brian Wild MD at [address] [place] [address]    GASTRIC FUNDOPLICATION N/A 73/82/1051    XI LAPAROSCOPIC ROBOTIC [address], CHERYL FUNDOPLICATION performed by Jaqueline Munoz MD at [address] [place] [address] 03/27/2020    EGD PEG TUBE PLACEMENT performed by Jaqueline Munoz MD at [address] [place] [address] N/A 2/8/2021    **CASE IN O.R. W/ GI STAFF - EGD WITH REMOVAL PEG TUBE performed by Brian Wild MD at [facility]    HAND SURGERY      [facility]. CATH POWER PICC TRIPLE  03/04/2020         HERNIA REPAIR      Hiatal hernia    HYSTERECTOMY (CERVIX STATUS UNKNOWN)      Abdominal    IR NONTUNNELED VASCULAR CATHETER  5/10/2022    IR NONTUNNELED VASCULAR CATHETER 5/10/2022 Darshana Noble MD [facility]    JOINT REPLACEMENT Right     right hip    OVARY REMOVAL RHINOPLASTY      TONSILLECTOMY      TOTAL HIP ARTHROPLASTY      TOTAL NEPHRECTOMY      atrophic from infections, age 12    TRACHEOSTOMY N/A 03/27/2020    **ADD ON **WANTS 10:00AM **TRACHEOTOMY performed by Pankaj Martin MD at [facility] N/A 04/02/2020    TRACHEOTOMY EXCHANGE performed by Pankaj Martin MD at [facility] 03/17/2020    BEDSIDE EGD ESOPHAGOGASTRODUODENOSCOPY (ICU) performed by Pankaj Martin MD at [address] [place] [address],[employer] 05/18/2020    EGD BIOPSY performed by Palmira Black MD at [address] [place] [address],[employer]  05/18/2020    EGD DILATION BALLOON performed by Palmira Black MD at [address] [place] [address],[employer] N/A 07/06/2020    ** CASE IN O.R. WITH GI STAFF** EGD ESOPHAGOGASTRODUODENOSCOPY performed by Mayte Miles MD at [address] [place] [address],[employer] 11/09/2020    EGD DIAGNOSTIC ONLY performed by Mary Bryson MD at [address] [place] [address],[employer]  02/08/2021    - EGD WITH REMOVAL PEG TUBE,ERCP STENT REMOVAL,ERCP STONE REMOVAL    UPPER GASTROINTESTINAL ENDOSCOPY N/A 11/24/2021    EGD BIOPSY performed by Mary Bryson MD at [address] [place] [address],[employer] 9/8/2022    EGD BIOPSY performed by Palmira Black MD at [address] [place] [address] N/A 10/11/2022    EGD BIOPSY performed by Palmira Black MD at [address] [place] [address]       Medications:      Current Facility-Administered Medications:     Baclofen (LIORESAL) tablet 5 mg, 5 mg, Oral, TID PRN, Erin Jo MD    traZODone (DESYREL) tablet 100 mg, 100 mg, Oral, Nightly, rEin Jo MD, 100 mg at 02/07/23 2116    polyethylene glycol (GLYCOLAX) packet 17 g, 17 g, Oral, Daily, Erin Jo MD, 17 g at 02/08/23 0846    phenazopyridine (PYRIDIUM) tablet 200 mg, 200 mg, Oral, TID WC, Erin Jo MD, 200 mg at 02/08/23 1236 ketorolac (TORADOL) injection 15 mg, 15 mg, IntraVENous, Q6H PRN, Lupe Rogers MD, 15 mg at 02/07/23 1701    tamsulosin (FLOMAX) capsule 0.4 mg, 0.4 mg, Oral, Daily, Lupe Rogers MD, 0.4 mg at 02/08/23 0835    oxyCODONE-acetaminophen (PERCOCET) 5-325 MG per tablet 1 tablet, 1 tablet, Oral, Q12H PRN, Lupe Rogers MD, 1 tablet at 02/08/23 0550    benzonatate (TESSALON) capsule 100 mg, 100 mg, Oral, TID PRN, Marvin Li MD, 100 mg at 02/05/23 2230    clonazePAM (KLONOPIN) tablet 0.5 mg, 0.5 mg, Oral, TID PRN, Lupe Rogers MD, 0.5 mg at 02/08/23 0550    albuterol (PROVENTIL) nebulizer solution 2.5 mg, 2.5 mg, Nebulization, Q6H PRN, Panfilo Wilks MD    aluminum & magnesium hydroxide-simethicone (MAALOX) 200-200-20 MG/5ML suspension 5 mL, 5 mL, Oral, PRN, Helen Diop MD    busPIRone (BUSPAR) tablet 10 mg, 10 mg, Oral, BID, Panfilo Wilks MD, 10 mg at 02/08/23 0836    carboxymethylcellulose PF (THERATEARS) 1 % ophthalmic gel 1 drop, 1 drop, Both Eyes, Q6H, Panfilo Wilks MD, 1 drop at 02/08/23 1046    Vitamin D (CHOLECALCIFEROL) tablet 2,000 Units, 2,000 Units, Oral, Daily, Panfilo Wilks MD, 2,000 Units at 02/08/23 0835    escitalopram (LEXAPRO) tablet 20 mg, 20 mg, Oral, Daily, Panfilo Wilks MD, 20 mg at 02/08/23 0836    ferrous sulfate (FE TABS 325) EC tablet 325 mg, 325 mg, Oral, Daily with breakfast, Panfilo Wilks MD, 325 mg at 02/08/23 0836    [Held by provider] trospium (SANCTURA) tablet 20 mg, 20 mg, Oral, BID AC, Panfilo Wilks MD, 20 mg at 02/08/23 0837    [Held by provider] fluticasone (FLONASE) 50 MCG/ACT nasal spray 1 spray, 1 spray, Each Nostril, Daily, Panfilo Wilks MD    gabapentin (NEURONTIN) capsule 300 mg, 300 mg, Oral, TID, Panfilo Wilks MD, 300 mg at 02/08/23 0836    guaiFENesin (MUCINEX) extended release tablet 600 mg, 600 mg, Oral, Daily, Panfilo Wilks MD, 600 mg at 02/08/23 0836    lactobacillus (CULTURELLE) capsule, , Oral, BID, Panfilo Wilks MD, 1 capsule at 02/08/23 0800    [Held by provider] cetirizine (ZYRTEC) tablet 5 mg, 5 mg, Oral, Daily, Panfiol Wilks MD    magnesium hydroxide (MILK OF MAGNESIA) 400 MG/5ML suspension 30 mL, 30 mL, Oral, Nightly, Panfilo Wilks MD, 30 mL at 02/07/23 2116    metoprolol succinate (TOPROL XL) extended release tablet 25 mg, 25 mg, Oral, Daily, Panfilo Wilks MD, 25 mg at 02/08/23 0836    pantoprazole (PROTONIX) tablet 40 mg, 40 mg, Oral, BID, Panfilo Wilks MD, 40 mg at 02/08/23 0835    polyethylene glycol (GLYCOLAX) packet 17 g, 17 g, Oral, Daily PRN, Panfilo Wilks MD    QUEtiapine (SEROQUEL) tablet 100 mg, 100 mg, Oral, Nightly, Panfilo Wilks MD, 100 mg at 02/07/23 2116    atorvastatin (LIPITOR) tablet 40 mg, 40 mg, Oral, Daily, Panfilo Wilks MD, 40 mg at 02/08/23 0836    sodium chloride flush 0.9 % injection 5-40 mL, 5-40 mL, IntraVENous, 2 times per day, Israel Duncan MD, 10 mL at 02/08/23 0837    sodium chloride flush 0.9 % injection 5-40 mL, 5-40 mL, IntraVENous, PRN, Israel Duncan MD    0.9 % sodium chloride infusion, , IntraVENous, PRN, Panfilo Wilks MD    ondansetron (ZOFRAN-ODT) disintegrating tablet 4 mg, 4 mg, Oral, Q8H PRN, 4 mg at 02/08/23 1238 **OR** ondansetron (ZOFRAN) injection 4 mg, 4 mg, IntraVENous, Q6H PRN, Panfilo Wilks MD, 4 mg at 02/04/23 1338    acetaminophen (TYLENOL) tablet 650 mg, 650 mg, Oral, Q6H PRN, 650 mg at 02/05/23 0697 **OR** acetaminophen (TYLENOL) suppository 650 mg, 650 mg, Rectal, Q6H PRN, Panfilo Wilks MD    heparin (porcine) injection 5,000 Units, 5,000 Units, SubCUTAneous, 3 times per day, Israel Duncan MD, 5,000 Units at 02/08/23 0532    meropenem (MERREM) 1,000 mg in sodium chloride 0.9 % 100 mL IVPB (mini-bag), 1,000 mg, IntraVENous, Q8H, Tristian Garcia MD, Last Rate: 200 mL/hr at 02/08/23 1235, 1,000 mg at 02/08/23 1235    calcium carbonate tablet 600 mg, 600 mg, Oral, BID, Tristian Garcia MD, 600 mg at 02/08/23 0836    Allergies:    Allergies   Allergen Reactions Prednisone Anxiety    Compazine [Prochlorperazine Maleate]     Penicillin V Potassium     Penicillins Other (See Comments)     Shock- received meropenem and ceftriaxone wo issues 2020       Social History:     Social History     Socioeconomic History    Marital status:      Spouse name: Not on file    Number of children: 2    Years of education: Not on file    Highest education level: Not on file   Occupational History    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   Tobacco Use    Smoking status: Former     Packs/day: 1.00     Years: 50.00     Pack years: 50.00     Types: Cigarettes    Smokeless tobacco: Never    Tobacco comments:     quit 1 year ago    Vaping Use    Vaping Use: Former    Substances: Always   Substance and Sexual Activity    Alcohol use: No    Drug use: No    Sexual activity: Never   Other Topics Concern    Not on file   Social History Narrative    Not on file     Social Determinants of Health     Financial Resource Strain: Not on file   Food Insecurity: Not on file   Transportation Needs: Not on file   Physical Activity: Not on file   Stress: Not on file   Social Connections: Not on file   Intimate Partner Violence: Not on file   Housing Stability: Not on file       Family History:    Family History   Problem Relation Age of Onset    Cancer Mother         uterine    Heart Attack Mother     Heart Attack Father     Other Maternal Grandfather         foot gangrene    Other Paternal Grandmother         bleeding ulcers    Other Paternal Grandfather         lung cancer       REVIEW OF SYSTEMS:  A comprehensive 14 point review of systems was obtained.   Constitutional: No fatigue  Eyes: No blurry vision  Ears, nose, mouth, throat, face: No ringing in the ears; no facial droop  Respiratory: No cough or cold  Cardiovascular: No palpitations  Gastrointestinal: No diarrhea or constipation  Genitourinary: See HPI  Integument/Skin: No rashes  Hematologic/Lymphatic: No easy bruising  Musculoskeletal: No muscle pains  Neurologic: No weakness in the extremities  Psychiatric: No depression or suicidal thoughts  Endocrine: No heat or cold intolerances  Allergic/Immunologic: No current seasonal allergies; no skin hives    Physical Exam:      This a 68 y.o. female   Vitals:    02/08/23 1152   BP: (!) 99/55   Pulse: 64   Resp: 18   Temp:    SpO2: 93%       Constitutional: Patient in no acute distress  Neuro: Alert and oriented to person place and time   Psych: Mood and affect normal  Head: Atraumatic and normocephalic  Neck: Trachea midline  Lungs: Respiratory effort normal  Cardiovascular:  Regular rhythm  Abdomen: Soft, non-tender, non-distended. CVA tenderness none  Bladder: Mildly tender but not distended  Ext: 2+ DP pulses bilaterally  Skin: No rashes or bruising present  Lymphatics: No palpable lymphadenopathy  Pelvic exam: Deferred  Rectal exam: Not indicated    Labs:  Recent Labs     02/06/23  0430 02/07/23  0456 02/08/23  0425   WBC 2.7* 3.4* 3.9   HGB 11.8* 12.7 11.8*   HCT 38.8 40.8 38.4   MCV 99.0 95.1 95.8   * 165 161     Recent Labs     02/06/23  0430 02/07/23  0456 02/08/23  0425    136 136   K 4.2 4.3 4.6    101 101   CO2 24 24 24   BUN 10 12 14   CREATININE 0.84 0.79 0.89       No results for input(s): COLORU, PHUR, LABCAST, WBCUA, RBCUA, MUCUS, TRICHOMONAS, YEAST, BACTERIA, CLARITYU, SPECGRAV, LEUKOCYTESUR, UROBILINOGEN, BILIRUBINUR, BLOODU in the last 72 hours. Invalid input(s): NITRATE, GLUCOSEUKETONESUAMORPHOUS    Culture results:  Urine Cx: AEROCOCCUS URINAE >[id number] CFU/ML     -----------------------------------------------------------------  Imaging Results:    CT ABDOMEN PELVIS W IV CONTRAST Additional Contrast? None    Result Date: 2/3/2023  EXAMINATION: CT OF THE ABDOMEN AND PELVIS WITH CONTRAST 2/3/2023 12:42 pm TECHNIQUE: CT of the abdomen and pelvis was performed with the administration of intravenous contrast. Multiplanar reformatted images are provided for review.  Automated exposure control, iterative reconstruction, and/or weight based adjustment of the mA/kV was utilized to reduce the radiation dose to as low as reasonably achievable. COMPARISON: 01/01/2023 HISTORY: ORDERING SYSTEM PROVIDED HISTORY: febrile, abdomen mass TECHNOLOGIST PROVIDED HISTORY: febrile, abdomen mass Reason for Exam: febrile, abdomen mass FINDINGS: LOWER CHEST: Axial hiatal hernia containing part of the stomach. Linear atelectatic changes within the lower lobes. KIDNEYS AND URINARY TRACT: No renal calculi are identified. There is no evidence for hydronephrosis. The ureters are of normal course and caliber. Right kidney is not visualized in the right renal fossa, unchanged. Simple cyst within the left kidney. ORGANS: Fatty liver. Status post cholecystectomy. Visualized portions of the liver, spleen, pancreas, gallbladder, and adrenal glands demonstrate no acute abnormality. GI/BOWEL: No bowel obstruction. No evidence of acute appendicitis. Reported history of abdominal mass. No discrete mass is noted within the abdomen. There is significant wall thickening noted within the ascending colon which was not present on prior examination. Finding may be related to colitis. There is large amount of stool throughout the transverse colon. Yemi Barks PELVIS: The bladder and pelvic organs are unremarkable. Status posthysterectomy. PERITONEUM/RETROPERITONEUM: No free air or free fluid is noted. No pathologically enlarged lymphadenopathy. The vasculature do not demonstrate acute abnormality. BONES/SOFT TISSUES: The osseous structures demonstrate no acute abnormality. Status post right hip arthroplasty. Moderate levoscoliosis. There is significant wall thickening noted within the ascending colon which was not present on prior examination. Finding may be related to colitis. Large arge axial hiatal hernia. status post right nephrectomy, cholecystectomy and hysterectomy. Fatty liver.  Extensive stool throughout the transverse colon.       Assessment and Plan   Impression:  67 yo female with AMS, Sepsis, Complicated UTI     problem list -   -Complicated UTI  -Interstitial cystitis  -Acute on chronic Urinary Retention    Patient Active Problem List   Diagnosis    Frequency of urination    Backache    GERD (gastroesophageal reflux disease)    MVP (mitral valve prolapse)    Depression    Anxiety    BENEDICT (acute kidney injury) ([facility]    Dysphagia    Hiatal hernia    History of repair of hiatal hernia    Centrilobular emphysema (HCC)    Esophageal dilatation    Shock ([facility]    Fever    Critical illness polyneuropathy ([facility]    Gastric outlet obstruction    Recurrent UTI    Tracheostomy in place [facility])    H/O gastric ulcer    Hyperlipidemia    Essential hypertension    Tobacco abuse    Chronic respiratory failure with hypoxia ([facility])    S/P percutaneous endoscopic gastrostomy (PEG) tube placement ([facility])    S/P Nissen fundoplication (without gastrostomy tube) procedure    Anemia due to blood loss    Phlebitis after infusion    Esophagitis determined by endoscopy    Expressive aphasia    Transient speech disturbance    Speech disturbance    Coffee ground emesis    Acute cystitis without hematuria    Ulcerative esophagitis    Septicemia ([facility])    Lower abdominal pain    Ileus (HCC)    Chronic pain    Dyspnea on exertion    Orthostatic hypotension    Stage 3 chronic kidney disease (HCC)    Difficulty walking    Class 1 obesity in adult    Generalized abdominal pain    Multifocal pneumonia    Other constipation    Intractable abdominal pain    ESBL (extended spectrum beta-lactamase) producing bacteria infection    Complicated UTI (urinary tract infection)    COVID-19    Acute on chronic respiratory failure with hypoxia (HCC)    COVID    Hypoxia    Elevated C-reactive protein (CRP)    Chronic obstructive pulmonary disease (HCC)    Acute respiratory failure with hypoxia (HCC)    Colitis    History of ESBL E. coli infection    Acute metabolic encephalopathy Congenital solitary kidney    Polypharmacy    Altered mental status           Plan:   -We would like to keep patient on Amitryptiline for IC symptoms, however thoroughly explained to patient that this medication can contribute to urinary retention, and has a lot of other side effects, but patient would like to resume it  -Stop Flomax, Stop Sanctura  -Patient may have baseline retention and we recommend CIC regimen. If PVR amounts of <100mL two times in a row, ok to discontinue CIC regimen. Prompt patient to double void every 4 hours, then check post void residual (bladder scan or straight cath). If residual is <100mL twice in a row, may discontinue CIC. -She will need to schedule a follow up with Dr. Melany Nathan in [facility] office in 2-3 weeks    Thank you for involving us in the care of Mary Brooks. Should you have any questions, please do not hesitate to contact us at any time.     Wanda Hammans, PA-C  Urology Service   2:05 PM 2/8/2023

## 2023-02-08 NOTE — CARE COORDINATION
Discharge order placed for patient. CM called and spoke with Toya Acuna from [facility] and gave update that patient will be discharging back to their facility at 12pm today. CM faxed 12p transportation request to  [facility]. PS message sent to attending resident requesting [address] [place] completion. Call received from Audrey Solis with  [facility] stating that the earliest transportation time they have available is 4pm.    1050- CM called and spoke with Mague Silveira from [facility] and gave update that patient will be discharging back at 4pm today. Mague Silveira confirmed that they will be able to straight cath patient with order. 1200-  Discharge order cancelled. PS message sent to attending questioning if patient will still be ready to discharge back to SNF today.       RN to call report to [phone]    Discharge [facility] Case Management Department  Written by: Keagan Thompson RN    Patient Name: Oumar Bar  Attending Provider: Gila Xiao MD  Admit Date: 2/3/2023 12:30 PM  MRN: [id number]  Account: [de-identified]                     : 1945  Discharge Date: 2023      Disposition: SNF- [facility]

## 2023-02-08 NOTE — PROGRESS NOTES
PT informed writer she wants to be seen by PT and OT, states she has not been seen yet. Approx 1000 writer saw treatment OT in dept. Informed pateint has not yet been seen for OT eval ordered 2/5. OT informed writer she would inform the evaluation OT she wants to be seen. Writer called PT around 1100 to notify them, pt has not been seen yet.  PT informed writer they would add them to the list.

## 2023-02-08 NOTE — PROGRESS NOTES
Patient transported to [facility] by [facility]. All belongings sent with patient. Report called to Maxim Keller RN at [facility]. All questions were answered and call back number [phone] given for follow-up questions.

## 2023-02-10 LAB
EKG ATRIAL RATE: 70 BPM
EKG P AXIS: 52 DEGREES
EKG P-R INTERVAL: 168 MS
EKG Q-T INTERVAL: 404 MS
EKG QRS DURATION: 82 MS
EKG QTC CALCULATION (BAZETT): 436 MS
EKG R AXIS: 42 DEGREES
EKG T AXIS: 43 DEGREES
EKG VENTRICULAR RATE: 70 BPM

## 2023-02-10 PROCEDURE — 93010 ELECTROCARDIOGRAM REPORT: CPT | Performed by: INTERNAL MEDICINE

## 2023-02-23 ENCOUNTER — TELEPHONE (OUTPATIENT)
Dept: GASTROENTEROLOGY | Age: 78
End: 2023-02-23

## 2023-06-22 ENCOUNTER — OFFICE VISIT (OUTPATIENT)
Dept: GASTROENTEROLOGY | Age: 78
End: 2023-06-22
Payer: MEDICARE

## 2023-06-22 VITALS — RESPIRATION RATE: 20 BRPM | HEART RATE: 75 BPM | SYSTOLIC BLOOD PRESSURE: 139 MMHG | DIASTOLIC BLOOD PRESSURE: 72 MMHG

## 2023-06-22 DIAGNOSIS — R19.8 ABNORMAL FINDINGS ON ESOPHAGOGASTRODUODENOSCOPY (EGD): ICD-10-CM

## 2023-06-22 DIAGNOSIS — R74.8 ELEVATED LIVER ENZYMES: ICD-10-CM

## 2023-06-22 DIAGNOSIS — Z98.890 S/P NISSEN FUNDOPLICATION (WITHOUT GASTROSTOMY TUBE) PROCEDURE: ICD-10-CM

## 2023-06-22 DIAGNOSIS — R93.3 ABNORMAL CT SCAN, SIGMOID COLON: ICD-10-CM

## 2023-06-22 DIAGNOSIS — R11.0 NAUSEA: Primary | ICD-10-CM

## 2023-06-22 DIAGNOSIS — K21.01 GASTROESOPHAGEAL REFLUX DISEASE WITH ESOPHAGITIS AND HEMORRHAGE: ICD-10-CM

## 2023-06-22 DIAGNOSIS — K31.1 GASTRIC OUTLET OBSTRUCTION: ICD-10-CM

## 2023-06-22 PROCEDURE — G8400 PT W/DXA NO RESULTS DOC: HCPCS | Performed by: INTERNAL MEDICINE

## 2023-06-22 PROCEDURE — 3075F SYST BP GE 130 - 139MM HG: CPT | Performed by: INTERNAL MEDICINE

## 2023-06-22 PROCEDURE — 1123F ACP DISCUSS/DSCN MKR DOCD: CPT | Performed by: INTERNAL MEDICINE

## 2023-06-22 PROCEDURE — 3078F DIAST BP <80 MM HG: CPT | Performed by: INTERNAL MEDICINE

## 2023-06-22 PROCEDURE — 1090F PRES/ABSN URINE INCON ASSESS: CPT | Performed by: INTERNAL MEDICINE

## 2023-06-22 PROCEDURE — 99214 OFFICE O/P EST MOD 30 MIN: CPT | Performed by: INTERNAL MEDICINE

## 2023-06-22 PROCEDURE — 1036F TOBACCO NON-USER: CPT | Performed by: INTERNAL MEDICINE

## 2023-06-22 PROCEDURE — G8427 DOCREV CUR MEDS BY ELIG CLIN: HCPCS | Performed by: INTERNAL MEDICINE

## 2023-06-22 PROCEDURE — G8417 CALC BMI ABV UP PARAM F/U: HCPCS | Performed by: INTERNAL MEDICINE

## 2023-06-22 RX ORDER — FAMOTIDINE 20 MG/1
20 TABLET, FILM COATED ORAL 2 TIMES DAILY
Qty: 60 TABLET | Refills: 3 | Status: SHIPPED | OUTPATIENT
Start: 2023-06-22

## 2023-06-22 ASSESSMENT — ENCOUNTER SYMPTOMS
VOMITING: 0
BLOOD IN STOOL: 0
DIARRHEA: 1
SHORTNESS OF BREATH: 0
CONSTIPATION: 1
ABDOMINAL DISTENTION: 0
ABDOMINAL PAIN: 1
TROUBLE SWALLOWING: 0
COUGH: 0
WHEEZING: 0
ANAL BLEEDING: 0
CHOKING: 0
NAUSEA: 1
RECTAL PAIN: 0

## 2023-06-22 NOTE — PROGRESS NOTES
and agree with the ROS entered by the MA/RN. Note is dictated utilizing voice recognition software. Unfortunately this leads to occasional typographical errors. Please contact our office if you have any questions.       Blake Mccoy MD  [facility]  O: #[phone]

## 2023-07-14 ENCOUNTER — APPOINTMENT (OUTPATIENT)
Dept: CT IMAGING | Age: 78
End: 2023-07-14
Payer: MEDICARE

## 2023-07-14 ENCOUNTER — HOSPITAL ENCOUNTER (EMERGENCY)
Age: 78
Discharge: HOME | End: 2023-07-15
Attending: EMERGENCY MEDICINE
Payer: MEDICARE

## 2023-07-14 ENCOUNTER — APPOINTMENT (OUTPATIENT)
Dept: GENERAL RADIOLOGY | Age: 78
End: 2023-07-14
Payer: MEDICARE
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
## 2023-07-14 PROCEDURE — 96374 THER/PROPH/DIAG INJ IV PUSH: CPT

## 2023-07-14 PROCEDURE — 99284 EMERGENCY DEPT VISIT MOD MDM: CPT

## 2023-07-14 PROCEDURE — 73030 X-RAY EXAM OF SHOULDER: CPT

## 2023-07-14 PROCEDURE — 70450 CT HEAD/BRAIN W/O DYE: CPT

## 2023-07-14 PROCEDURE — 6360000002 HC RX W HCPCS

## 2023-07-14 PROCEDURE — 93005 ELECTROCARDIOGRAM TRACING: CPT | Performed by: EMERGENCY MEDICINE

## 2023-07-14 PROCEDURE — 73523 X-RAY EXAM HIPS BI 5/> VIEWS: CPT

## 2023-07-14 PROCEDURE — 72125 CT NECK SPINE W/O DYE: CPT

## 2023-07-14 RX ORDER — PANTOPRAZOLE SODIUM 40 MG/1
40 TABLET, DELAYED RELEASE ORAL
Status: DISCONTINUED | OUTPATIENT
Start: 2023-07-15 | End: 2023-07-15 | Stop reason: HOSPADM

## 2023-07-14 RX ORDER — SIMVASTATIN 40 MG
40 TABLET ORAL NIGHTLY
Status: DISCONTINUED | OUTPATIENT
Start: 2023-07-15 | End: 2023-07-15 | Stop reason: HOSPADM

## 2023-07-14 RX ORDER — ESCITALOPRAM OXALATE 10 MG/1
20 TABLET ORAL DAILY
Status: DISCONTINUED | OUTPATIENT
Start: 2023-07-15 | End: 2023-07-15 | Stop reason: HOSPADM

## 2023-07-14 RX ORDER — TROSPIUM CHLORIDE 20 MG/1
20 TABLET, FILM COATED ORAL
Status: DISCONTINUED | OUTPATIENT
Start: 2023-07-15 | End: 2023-07-15 | Stop reason: HOSPADM

## 2023-07-14 RX ORDER — AMITRIPTYLINE HYDROCHLORIDE 25 MG/1
25 TABLET, FILM COATED ORAL 3 TIMES DAILY
Status: DISCONTINUED | OUTPATIENT
Start: 2023-07-15 | End: 2023-07-15 | Stop reason: HOSPADM

## 2023-07-14 RX ORDER — TRAZODONE HYDROCHLORIDE 100 MG/1
100 TABLET ORAL NIGHTLY
Status: DISCONTINUED | OUTPATIENT
Start: 2023-07-15 | End: 2023-07-15 | Stop reason: HOSPADM

## 2023-07-14 RX ORDER — BUSPIRONE HYDROCHLORIDE 10 MG/1
10 TABLET ORAL 2 TIMES DAILY
Status: DISCONTINUED | OUTPATIENT
Start: 2023-07-15 | End: 2023-07-15 | Stop reason: HOSPADM

## 2023-07-14 RX ORDER — GABAPENTIN 300 MG/1
300 CAPSULE ORAL 3 TIMES DAILY
Status: DISCONTINUED | OUTPATIENT
Start: 2023-07-15 | End: 2023-07-15 | Stop reason: HOSPADM

## 2023-07-14 RX ORDER — CETIRIZINE HYDROCHLORIDE 10 MG/1
10 TABLET ORAL DAILY
Status: DISCONTINUED | OUTPATIENT
Start: 2023-07-15 | End: 2023-07-15 | Stop reason: HOSPADM

## 2023-07-14 RX ORDER — LACTOBACILLUS RHAMNOSUS GG 10B CELL
1 CAPSULE ORAL 2 TIMES DAILY WITH MEALS
Status: DISCONTINUED | OUTPATIENT
Start: 2023-07-15 | End: 2023-07-15 | Stop reason: HOSPADM

## 2023-07-14 RX ORDER — FLUTICASONE PROPIONATE 50 MCG
1 SPRAY, SUSPENSION (ML) NASAL DAILY
Status: DISCONTINUED | OUTPATIENT
Start: 2023-07-15 | End: 2023-07-15 | Stop reason: HOSPADM

## 2023-07-14 RX ORDER — LANOLIN ALCOHOL/MO/W.PET/CERES
325 CREAM (GRAM) TOPICAL
Status: DISCONTINUED | OUTPATIENT
Start: 2023-07-15 | End: 2023-07-15 | Stop reason: HOSPADM

## 2023-07-14 RX ORDER — VITAMIN B COMPLEX
1000 TABLET ORAL DAILY
Status: DISCONTINUED | OUTPATIENT
Start: 2023-07-15 | End: 2023-07-15 | Stop reason: HOSPADM

## 2023-07-14 RX ORDER — METOPROLOL SUCCINATE 25 MG/1
25 TABLET, EXTENDED RELEASE ORAL DAILY
Status: DISCONTINUED | OUTPATIENT
Start: 2023-07-15 | End: 2023-07-15 | Stop reason: HOSPADM

## 2023-07-14 RX ORDER — CARBOXYMETHYLCELLULOSE SODIUM 10 MG/ML
1 GEL OPHTHALMIC 4 TIMES DAILY
Status: DISCONTINUED | OUTPATIENT
Start: 2023-07-15 | End: 2023-07-15 | Stop reason: HOSPADM

## 2023-07-14 RX ORDER — FENTANYL CITRATE 50 UG/ML
50 INJECTION, SOLUTION INTRAMUSCULAR; INTRAVENOUS ONCE
Status: COMPLETED | OUTPATIENT
Start: 2023-07-14 | End: 2023-07-14

## 2023-07-14 RX ADMIN — FENTANYL CITRATE 50 MCG: 50 INJECTION, SOLUTION INTRAMUSCULAR; INTRAVENOUS at 20:35

## 2023-07-14 ASSESSMENT — PAIN - FUNCTIONAL ASSESSMENT: PAIN_FUNCTIONAL_ASSESSMENT: 0-10

## 2023-07-14 ASSESSMENT — PAIN SCALES - GENERAL: PAINLEVEL_OUTOF10: 8

## 2023-07-14 ASSESSMENT — ENCOUNTER SYMPTOMS: BACK PAIN: 1

## 2023-07-14 ASSESSMENT — PAIN DESCRIPTION - LOCATION: LOCATION: HEAD

## 2023-07-15 VITALS
TEMPERATURE: 97.6 F | HEART RATE: 73 BPM | OXYGEN SATURATION: 96 % | HEIGHT: 62 IN | RESPIRATION RATE: 16 BRPM | WEIGHT: 180 LBS | SYSTOLIC BLOOD PRESSURE: 173 MMHG | DIASTOLIC BLOOD PRESSURE: 82 MMHG | BODY MASS INDEX: 33.13 KG/M2

## 2023-07-15 LAB
EKG ATRIAL RATE: 71 BPM
EKG P AXIS: 45 DEGREES
EKG P-R INTERVAL: 182 MS
EKG Q-T INTERVAL: 390 MS
EKG QRS DURATION: 76 MS
EKG QTC CALCULATION (BAZETT): 423 MS
EKG R AXIS: 28 DEGREES
EKG T AXIS: 64 DEGREES
EKG VENTRICULAR RATE: 71 BPM

## 2023-07-15 PROCEDURE — 6370000000 HC RX 637 (ALT 250 FOR IP)

## 2023-07-15 RX ORDER — ACETAMINOPHEN 500 MG
1000 TABLET ORAL
Status: COMPLETED | OUTPATIENT
Start: 2023-07-15 | End: 2023-07-15

## 2023-07-15 RX ADMIN — ACETAMINOPHEN 1000 MG: 500 TABLET ORAL at 00:35

## 2023-07-15 NOTE — ED TRIAGE NOTES
Pt arrived to ED from EMS with c/o of a fall. Pt stated she changed in the bathroom and dropped a  when she went to grab it and fell backwards. Pt hit her head but denies LOC or blood thinners. Pt stated she has a hx of HTN and the right kidney removed when she was 13. Pt hooked up to monitor,bp, pulse ox. IV access started with labs drawn. EKG completed and charted.  Pt appears to be in no acute distress at this time

## 2023-07-15 NOTE — DISCHARGE INSTRUCTIONS
Thank you for visiting [address] Emergency Department. You need to see your nursing home PCP for follow up. Should you have any questions regarding your care or further treatment, please call [facility] at [phone].

## 2023-07-15 NOTE — ED NOTES
Patient in need of wheelchair van transportation [address] to accommodate transportation @  [facility], [facility]  07/14/23 9440

## 2023-07-18 ENCOUNTER — HOSPITAL ENCOUNTER (INPATIENT)
Age: 78
LOS: 7 days | Discharge: NURSING, REHAB OR LONG-TERM CARE | DRG: 372 | End: 2023-07-26
Attending: EMERGENCY MEDICINE | Admitting: INTERNAL MEDICINE
Payer: MEDICARE

## 2023-07-18 ENCOUNTER — APPOINTMENT (OUTPATIENT)
Dept: CT IMAGING | Age: 78
DRG: 372 | End: 2023-07-18
Payer: MEDICARE

## 2023-07-18 ENCOUNTER — APPOINTMENT (OUTPATIENT)
Dept: GENERAL RADIOLOGY | Age: 78
DRG: 372 | End: 2023-07-18
Payer: MEDICARE

## 2023-07-18 DIAGNOSIS — F33.2 SEVERE RECURRENT MAJOR DEPRESSION WITHOUT PSYCHOTIC FEATURES (HCC): ICD-10-CM

## 2023-07-18 DIAGNOSIS — G89.29 OTHER CHRONIC PAIN: ICD-10-CM

## 2023-07-18 DIAGNOSIS — R26.2 DIFFICULTY WALKING: ICD-10-CM

## 2023-07-18 DIAGNOSIS — R11.2 NAUSEA AND VOMITING, UNSPECIFIED VOMITING TYPE: Primary | ICD-10-CM

## 2023-07-18 PROBLEM — R10.9 ABDOMINAL PAIN: Status: ACTIVE | Noted: 2023-07-18

## 2023-07-18 LAB
ALBUMIN SERPL-MCNC: 4.2 G/DL (ref 3.5–5.2)
ALBUMIN/GLOB SERPL: 1.6 {RATIO} (ref 1–2.5)
ALP SERPL-CCNC: 129 U/L (ref 35–104)
ALT SERPL-CCNC: 22 U/L (ref 5–33)
ANION GAP SERPL CALCULATED.3IONS-SCNC: 13 MMOL/L (ref 9–17)
AST SERPL-CCNC: 21 U/L
BASOPHILS # BLD: 0.05 K/UL (ref 0–0.2)
BASOPHILS NFR BLD: 1 % (ref 0–2)
BILIRUB SERPL-MCNC: 0.4 MG/DL (ref 0.3–1.2)
BILIRUB UR QL STRIP: NEGATIVE
BUN SERPL-MCNC: 14 MG/DL (ref 8–23)
CALCIUM SERPL-MCNC: 9.2 MG/DL (ref 8.6–10.4)
CASTS #/AREA URNS LPF: NORMAL /LPF (ref 0–8)
CHLORIDE SERPL-SCNC: 105 MMOL/L (ref 98–107)
CLARITY UR: CLEAR
CO2 SERPL-SCNC: 21 MMOL/L (ref 20–31)
COLOR UR: YELLOW
CREAT SERPL-MCNC: 1.1 MG/DL (ref 0.5–0.9)
EOSINOPHIL # BLD: 0.27 K/UL (ref 0–0.44)
EOSINOPHILS RELATIVE PERCENT: 4 % (ref 1–4)
EPI CELLS #/AREA URNS HPF: NORMAL /HPF (ref 0–5)
ERYTHROCYTE [DISTWIDTH] IN BLOOD BY AUTOMATED COUNT: 14.3 % (ref 11.8–14.4)
GFR SERPL CREATININE-BSD FRML MDRD: 52 ML/MIN/1.73M2
GLUCOSE SERPL-MCNC: 153 MG/DL (ref 70–99)
GLUCOSE UR STRIP-MCNC: NEGATIVE MG/DL
HCT VFR BLD AUTO: 45 % (ref 36.3–47.1)
HGB BLD-MCNC: 14.3 G/DL (ref 11.9–15.1)
HGB UR QL STRIP.AUTO: NEGATIVE
IMM GRANULOCYTES # BLD AUTO: 0.11 K/UL (ref 0–0.3)
IMM GRANULOCYTES NFR BLD: 2 %
KETONES UR STRIP-MCNC: NEGATIVE MG/DL
LACTIC ACID, SEPSIS WHOLE BLOOD: 2.2 MMOL/L (ref 0.5–1.9)
LACTIC ACID, WHOLE BLOOD: 1.6 MMOL/L (ref 0.7–2.1)
LEUKOCYTE ESTERASE UR QL STRIP: NEGATIVE
LIPASE SERPL-CCNC: 17 U/L (ref 13–60)
LYMPHOCYTES # BLD: 17 % (ref 24–43)
LYMPHOCYTES NFR BLD: 1.19 K/UL (ref 1.1–3.7)
MCH RBC QN AUTO: 30.4 PG (ref 25.2–33.5)
MCHC RBC AUTO-ENTMCNC: 31.8 G/DL (ref 28.4–34.8)
MCV RBC AUTO: 95.7 FL (ref 82.6–102.9)
MONOCYTES NFR BLD: 0.52 K/UL (ref 0.1–1.2)
MONOCYTES NFR BLD: 7 % (ref 3–12)
NEUTROPHILS NFR BLD: 69 % (ref 36–65)
NEUTS SEG NFR BLD: 4.95 K/UL (ref 1.5–8.1)
NITRITE UR QL STRIP: NEGATIVE
NRBC BLD-RTO: 0 PER 100 WBC
PH UR STRIP: 5.5 [PH] (ref 5–8)
PLATELET # BLD AUTO: 177 K/UL (ref 138–453)
PMV BLD AUTO: 10.9 FL (ref 8.1–13.5)
POTASSIUM SERPL-SCNC: 3.9 MMOL/L (ref 3.7–5.3)
PROT SERPL-MCNC: 6.8 G/DL (ref 6.4–8.3)
PROT UR STRIP-MCNC: ABNORMAL MG/DL
RBC # BLD AUTO: 4.7 M/UL (ref 3.95–5.11)
RBC #/AREA URNS HPF: NORMAL /HPF (ref 0–4)
SARS-COV-2 RDRP RESP QL NAA+PROBE: NOT DETECTED
SODIUM SERPL-SCNC: 139 MMOL/L (ref 135–144)
SP GR UR STRIP: 1.02 (ref 1–1.03)
SPECIMEN DESCRIPTION: NORMAL
UROBILINOGEN UR STRIP-ACNC: NORMAL EU/DL (ref 0–1)
WBC #/AREA URNS HPF: NORMAL /HPF (ref 0–5)
WBC OTHER # BLD: 7.1 K/UL (ref 3.5–11.3)

## 2023-07-18 PROCEDURE — 96361 HYDRATE IV INFUSION ADD-ON: CPT

## 2023-07-18 PROCEDURE — 96376 TX/PRO/DX INJ SAME DRUG ADON: CPT

## 2023-07-18 PROCEDURE — 6360000004 HC RX CONTRAST MEDICATION: Performed by: EMERGENCY MEDICINE

## 2023-07-18 PROCEDURE — 71046 X-RAY EXAM CHEST 2 VIEWS: CPT

## 2023-07-18 PROCEDURE — 6360000002 HC RX W HCPCS

## 2023-07-18 PROCEDURE — 85027 COMPLETE CBC AUTOMATED: CPT

## 2023-07-18 PROCEDURE — 94761 N-INVAS EAR/PLS OXIMETRY MLT: CPT

## 2023-07-18 PROCEDURE — 80053 COMPREHEN METABOLIC PANEL: CPT

## 2023-07-18 PROCEDURE — G0378 HOSPITAL OBSERVATION PER HR: HCPCS

## 2023-07-18 PROCEDURE — 99285 EMERGENCY DEPT VISIT HI MDM: CPT

## 2023-07-18 PROCEDURE — 6370000000 HC RX 637 (ALT 250 FOR IP): Performed by: EMERGENCY MEDICINE

## 2023-07-18 PROCEDURE — 87635 SARS-COV-2 COVID-19 AMP PRB: CPT

## 2023-07-18 PROCEDURE — 74177 CT ABD & PELVIS W/CONTRAST: CPT

## 2023-07-18 PROCEDURE — 96374 THER/PROPH/DIAG INJ IV PUSH: CPT

## 2023-07-18 PROCEDURE — 83605 ASSAY OF LACTIC ACID: CPT

## 2023-07-18 PROCEDURE — 2580000003 HC RX 258: Performed by: EMERGENCY MEDICINE

## 2023-07-18 PROCEDURE — 6360000002 HC RX W HCPCS: Performed by: EMERGENCY MEDICINE

## 2023-07-18 PROCEDURE — 81001 URINALYSIS AUTO W/SCOPE: CPT

## 2023-07-18 PROCEDURE — 96375 TX/PRO/DX INJ NEW DRUG ADDON: CPT

## 2023-07-18 PROCEDURE — 6370000000 HC RX 637 (ALT 250 FOR IP): Performed by: NURSE PRACTITIONER

## 2023-07-18 PROCEDURE — 83690 ASSAY OF LIPASE: CPT

## 2023-07-18 RX ORDER — ENOXAPARIN SODIUM 100 MG/ML
40 INJECTION SUBCUTANEOUS DAILY
Status: DISCONTINUED | OUTPATIENT
Start: 2023-07-19 | End: 2023-07-26 | Stop reason: HOSPADM

## 2023-07-18 RX ORDER — ONDANSETRON 2 MG/ML
4 INJECTION INTRAMUSCULAR; INTRAVENOUS ONCE
Status: COMPLETED | OUTPATIENT
Start: 2023-07-18 | End: 2023-07-18

## 2023-07-18 RX ORDER — ATORVASTATIN CALCIUM 40 MG/1
40 TABLET, FILM COATED ORAL DAILY
Status: DISCONTINUED | OUTPATIENT
Start: 2023-07-18 | End: 2023-07-26 | Stop reason: HOSPADM

## 2023-07-18 RX ORDER — TROSPIUM CHLORIDE 20 MG/1
20 TABLET, FILM COATED ORAL
Status: DISCONTINUED | OUTPATIENT
Start: 2023-07-18 | End: 2023-07-20

## 2023-07-18 RX ORDER — ACETAMINOPHEN 325 MG/1
650 TABLET ORAL EVERY 4 HOURS PRN
Status: DISCONTINUED | OUTPATIENT
Start: 2023-07-18 | End: 2023-07-26 | Stop reason: HOSPADM

## 2023-07-18 RX ORDER — FENTANYL CITRATE 50 UG/ML
50 INJECTION, SOLUTION INTRAMUSCULAR; INTRAVENOUS ONCE
Status: COMPLETED | OUTPATIENT
Start: 2023-07-18 | End: 2023-07-18

## 2023-07-18 RX ORDER — ONDANSETRON 4 MG/1
4 TABLET, ORALLY DISINTEGRATING ORAL EVERY 8 HOURS PRN
Status: DISCONTINUED | OUTPATIENT
Start: 2023-07-18 | End: 2023-07-26 | Stop reason: HOSPADM

## 2023-07-18 RX ORDER — 0.9 % SODIUM CHLORIDE 0.9 %
500 INTRAVENOUS SOLUTION INTRAVENOUS ONCE
Status: COMPLETED | OUTPATIENT
Start: 2023-07-18 | End: 2023-07-18

## 2023-07-18 RX ORDER — BUSPIRONE HYDROCHLORIDE 10 MG/1
10 TABLET ORAL 2 TIMES DAILY
Status: DISCONTINUED | OUTPATIENT
Start: 2023-07-18 | End: 2023-07-26 | Stop reason: HOSPADM

## 2023-07-18 RX ORDER — FAMOTIDINE 20 MG/1
20 TABLET, FILM COATED ORAL DAILY
Status: DISCONTINUED | OUTPATIENT
Start: 2023-07-18 | End: 2023-07-22

## 2023-07-18 RX ORDER — QUETIAPINE FUMARATE 50 MG/1
TABLET, FILM COATED ORAL DAILY
COMMUNITY
Start: 2023-06-22

## 2023-07-18 RX ORDER — LANOLIN ALCOHOL/MO/W.PET/CERES
325 CREAM (GRAM) TOPICAL
Status: DISCONTINUED | OUTPATIENT
Start: 2023-07-19 | End: 2023-07-21

## 2023-07-18 RX ORDER — BENZONATATE 100 MG/1
100 CAPSULE ORAL 3 TIMES DAILY PRN
Status: DISCONTINUED | OUTPATIENT
Start: 2023-07-18 | End: 2023-07-26 | Stop reason: HOSPADM

## 2023-07-18 RX ORDER — SODIUM CHLORIDE 9 MG/ML
INJECTION, SOLUTION INTRAVENOUS PRN
Status: DISCONTINUED | OUTPATIENT
Start: 2023-07-18 | End: 2023-07-26 | Stop reason: HOSPADM

## 2023-07-18 RX ORDER — SODIUM CHLORIDE 0.9 % (FLUSH) 0.9 %
5-40 SYRINGE (ML) INJECTION PRN
Status: DISCONTINUED | OUTPATIENT
Start: 2023-07-18 | End: 2023-07-26 | Stop reason: HOSPADM

## 2023-07-18 RX ORDER — PANTOPRAZOLE SODIUM 40 MG/1
40 TABLET, DELAYED RELEASE ORAL
Status: DISCONTINUED | OUTPATIENT
Start: 2023-07-18 | End: 2023-07-26 | Stop reason: HOSPADM

## 2023-07-18 RX ORDER — CETIRIZINE HYDROCHLORIDE 10 MG/1
5 TABLET ORAL DAILY
Status: DISCONTINUED | OUTPATIENT
Start: 2023-07-18 | End: 2023-07-26 | Stop reason: HOSPADM

## 2023-07-18 RX ORDER — AMITRIPTYLINE HYDROCHLORIDE 25 MG/1
25 TABLET, FILM COATED ORAL 3 TIMES DAILY
Status: DISCONTINUED | OUTPATIENT
Start: 2023-07-18 | End: 2023-07-26

## 2023-07-18 RX ORDER — ACETAMINOPHEN 500 MG
1000 TABLET ORAL ONCE
Status: COMPLETED | OUTPATIENT
Start: 2023-07-18 | End: 2023-07-18

## 2023-07-18 RX ORDER — FLUTICASONE PROPIONATE 50 MCG
1 SPRAY, SUSPENSION (ML) NASAL DAILY
Status: DISCONTINUED | OUTPATIENT
Start: 2023-07-18 | End: 2023-07-26 | Stop reason: HOSPADM

## 2023-07-18 RX ORDER — CLONAZEPAM 0.5 MG/1
0.5 TABLET ORAL 3 TIMES DAILY PRN
Status: DISCONTINUED | OUTPATIENT
Start: 2023-07-18 | End: 2023-07-26 | Stop reason: HOSPADM

## 2023-07-18 RX ORDER — GABAPENTIN 300 MG/1
300 CAPSULE ORAL 3 TIMES DAILY
Status: DISCONTINUED | OUTPATIENT
Start: 2023-07-18 | End: 2023-07-26 | Stop reason: HOSPADM

## 2023-07-18 RX ORDER — TIZANIDINE 2 MG/1
TABLET ORAL
COMMUNITY
Start: 2023-05-04

## 2023-07-18 RX ORDER — ONDANSETRON 2 MG/ML
4 INJECTION INTRAMUSCULAR; INTRAVENOUS EVERY 6 HOURS PRN
Status: DISCONTINUED | OUTPATIENT
Start: 2023-07-18 | End: 2023-07-26 | Stop reason: HOSPADM

## 2023-07-18 RX ORDER — LORAZEPAM 2 MG/ML
0.5 INJECTION INTRAMUSCULAR ONCE
Status: COMPLETED | OUTPATIENT
Start: 2023-07-18 | End: 2023-07-18

## 2023-07-18 RX ORDER — TRAZODONE HYDROCHLORIDE 100 MG/1
100 TABLET ORAL NIGHTLY
Status: DISCONTINUED | OUTPATIENT
Start: 2023-07-18 | End: 2023-07-26 | Stop reason: HOSPADM

## 2023-07-18 RX ORDER — OXYCODONE HYDROCHLORIDE AND ACETAMINOPHEN 5; 325 MG/1; MG/1
1 TABLET ORAL EVERY 6 HOURS PRN
Status: DISCONTINUED | OUTPATIENT
Start: 2023-07-18 | End: 2023-07-19

## 2023-07-18 RX ORDER — ESCITALOPRAM OXALATE 10 MG/1
20 TABLET ORAL DAILY
Status: DISCONTINUED | OUTPATIENT
Start: 2023-07-18 | End: 2023-07-26 | Stop reason: HOSPADM

## 2023-07-18 RX ORDER — BISACODYL 10 MG
10 SUPPOSITORY, RECTAL RECTAL DAILY PRN
Status: DISCONTINUED | OUTPATIENT
Start: 2023-07-18 | End: 2023-07-22

## 2023-07-18 RX ORDER — ALBUTEROL SULFATE 2.5 MG/3ML
2.5 SOLUTION RESPIRATORY (INHALATION) EVERY 6 HOURS PRN
Status: DISCONTINUED | OUTPATIENT
Start: 2023-07-18 | End: 2023-07-26 | Stop reason: HOSPADM

## 2023-07-18 RX ORDER — SODIUM CHLORIDE 0.9 % (FLUSH) 0.9 %
5-40 SYRINGE (ML) INJECTION EVERY 12 HOURS SCHEDULED
Status: DISCONTINUED | OUTPATIENT
Start: 2023-07-19 | End: 2023-07-26 | Stop reason: HOSPADM

## 2023-07-18 RX ORDER — METOPROLOL SUCCINATE 25 MG/1
25 TABLET, EXTENDED RELEASE ORAL DAILY
Status: DISCONTINUED | OUTPATIENT
Start: 2023-07-18 | End: 2023-07-19

## 2023-07-18 RX ORDER — QUETIAPINE FUMARATE 25 MG/1
50 TABLET, FILM COATED ORAL NIGHTLY
Status: DISCONTINUED | OUTPATIENT
Start: 2023-07-18 | End: 2023-07-24

## 2023-07-18 RX ORDER — ATORVASTATIN CALCIUM 40 MG/1
TABLET, FILM COATED ORAL
COMMUNITY
Start: 2023-07-13

## 2023-07-18 RX ADMIN — OXYCODONE HYDROCHLORIDE AND ACETAMINOPHEN 1 TABLET: 5; 325 TABLET ORAL at 23:38

## 2023-07-18 RX ADMIN — ONDANSETRON 4 MG: 2 INJECTION INTRAMUSCULAR; INTRAVENOUS at 23:50

## 2023-07-18 RX ADMIN — AMITRIPTYLINE HYDROCHLORIDE 25 MG: 25 TABLET, FILM COATED ORAL at 21:18

## 2023-07-18 RX ADMIN — ONDANSETRON 4 MG: 2 INJECTION INTRAMUSCULAR; INTRAVENOUS at 09:33

## 2023-07-18 RX ADMIN — TRAZODONE HYDROCHLORIDE 100 MG: 100 TABLET ORAL at 21:18

## 2023-07-18 RX ADMIN — Medication 600 MG: at 16:31

## 2023-07-18 RX ADMIN — FLUTICASONE PROPIONATE 1 SPRAY: 50 SPRAY, METERED NASAL at 15:47

## 2023-07-18 RX ADMIN — GABAPENTIN 300 MG: 300 CAPSULE ORAL at 15:44

## 2023-07-18 RX ADMIN — BUSPIRONE HYDROCHLORIDE 10 MG: 10 TABLET ORAL at 21:18

## 2023-07-18 RX ADMIN — BENZONATATE 100 MG: 100 CAPSULE ORAL at 16:40

## 2023-07-18 RX ADMIN — FAMOTIDINE 20 MG: 20 TABLET, FILM COATED ORAL at 15:44

## 2023-07-18 RX ADMIN — HYPROMELLOSE 2910 2 DROP: 5 SOLUTION OPHTHALMIC at 16:18

## 2023-07-18 RX ADMIN — FENTANYL CITRATE 50 MCG: 50 INJECTION, SOLUTION INTRAMUSCULAR; INTRAVENOUS at 11:02

## 2023-07-18 RX ADMIN — CETIRIZINE HYDROCHLORIDE 5 MG: 10 TABLET ORAL at 15:43

## 2023-07-18 RX ADMIN — IOPAMIDOL 75 ML: 755 INJECTION, SOLUTION INTRAVENOUS at 10:21

## 2023-07-18 RX ADMIN — DESMOPRESSIN ACETATE 40 MG: 0.2 TABLET ORAL at 15:43

## 2023-07-18 RX ADMIN — CLONAZEPAM 0.5 MG: 0.5 TABLET ORAL at 16:07

## 2023-07-18 RX ADMIN — ACETAMINOPHEN 1000 MG: 500 TABLET ORAL at 09:33

## 2023-07-18 RX ADMIN — METOPROLOL SUCCINATE 25 MG: 25 TABLET, FILM COATED, EXTENDED RELEASE ORAL at 16:17

## 2023-07-18 RX ADMIN — FENTANYL CITRATE 50 MCG: 50 INJECTION, SOLUTION INTRAMUSCULAR; INTRAVENOUS at 13:24

## 2023-07-18 RX ADMIN — PANTOPRAZOLE SODIUM 40 MG: 40 TABLET, DELAYED RELEASE ORAL at 15:44

## 2023-07-18 RX ADMIN — LORAZEPAM 0.5 MG: 2 INJECTION INTRAMUSCULAR; INTRAVENOUS at 15:22

## 2023-07-18 RX ADMIN — QUETIAPINE FUMARATE 50 MG: 25 TABLET ORAL at 21:18

## 2023-07-18 RX ADMIN — SODIUM CHLORIDE 500 ML: 0.9 INJECTION, SOLUTION INTRAVENOUS at 09:52

## 2023-07-18 RX ADMIN — GABAPENTIN 300 MG: 300 CAPSULE ORAL at 21:18

## 2023-07-18 RX ADMIN — ESCITALOPRAM OXALATE 20 MG: 10 TABLET ORAL at 16:17

## 2023-07-18 RX ADMIN — OXYCODONE HYDROCHLORIDE AND ACETAMINOPHEN 1 TABLET: 5; 325 TABLET ORAL at 16:27

## 2023-07-18 RX ADMIN — AMITRIPTYLINE HYDROCHLORIDE 25 MG: 25 TABLET, FILM COATED ORAL at 16:17

## 2023-07-18 ASSESSMENT — ENCOUNTER SYMPTOMS
NAUSEA: 1
ABDOMINAL PAIN: 1
COUGH: 0
BACK PAIN: 0
SHORTNESS OF BREATH: 0
DIARRHEA: 0
CONSTIPATION: 0
VOMITING: 1

## 2023-07-18 ASSESSMENT — PAIN DESCRIPTION - LOCATION
LOCATION: HEAD
LOCATION: ABDOMEN

## 2023-07-18 ASSESSMENT — LIFESTYLE VARIABLES
HOW OFTEN DO YOU HAVE A DRINK CONTAINING ALCOHOL: NEVER
HOW MANY STANDARD DRINKS CONTAINING ALCOHOL DO YOU HAVE ON A TYPICAL DAY: PATIENT DOES NOT DRINK

## 2023-07-18 ASSESSMENT — PAIN - FUNCTIONAL ASSESSMENT: PAIN_FUNCTIONAL_ASSESSMENT: 0-10

## 2023-07-18 ASSESSMENT — PAIN SCALES - GENERAL
PAINLEVEL_OUTOF10: 8
PAINLEVEL_OUTOF10: 6

## 2023-07-18 NOTE — ED PROVIDER NOTES
[facility] ED  Emergency Department Encounter  Emergency Medicine Resident     Pt Name:Erin Mcgill  MRN: [id number]  [address] 1945  Date of evaluation: 7/18/23  PCP:  No primary care provider on file. Note Started: 8:44 AM EDT      CHIEF COMPLAINT       Chief Complaint   Patient presents with    Nausea    Emesis     X2 days       HISTORY OF PRESENT ILLNESS  (Location/Symptom, Timing/Onset, Context/Setting, Quality, Duration, Modifying Factors, Severity.)      Petty Lee is a 68 y.o. female who presents with 2 days of nausea. Patient states she has not vomited. Patient states she had a bowel movement 2 days ago but has not had one since then. Patient also reports has been  unable to urinate since yesterday evening. Denies any back pain. Patient denies fevers, chills, chest pain, shortness of breath. Patient is tearful as she states \"I am scared\". Patient does report a history of hysterectomy as well as appendectomy and cholecystectomy. Patient reports she only has 1 kidney. Patient states she had a nephrectomy when she was a child.     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, Allergic rhinitis, Anemia, unspecified, Anxiety, Anxiety disorder, Arthritis, Atrial fibrillation (HCC), Back pain, Bronchitis, Caffeine use, Cardiomegaly, Carpal tunnel syndrome, Cataracts, bilateral, Centriacinar emphysema (HCC), Chronic kidney disease, stage III (moderate) ([facility]), Chronic pain, Constipation, Constipation, COPD (chronic obstructive pulmonary disease) ([address]), Cystitis with hematuria, Depression, Diaphragmatic hernia without obstruction or gangrene, Diarrhea, Difficulty walking, Dry eye syndrome of unspecified lacrimal gland, Esophageal obstruction, FOM (frequency of micturition), Foot drop,

## 2023-07-18 NOTE — CONSULTS
General Surgery:  Consult Note        PATIENT NAME: Dima Luna OF BIRTH: 1945    ADMISSION DATE: 7/18/2023  8:42 AM     Admitting Provider: Cindi Lu    Consulted Physician: Kasie Pendleton DATE: 7/18/2023    Chief Complaint: Abdominal pain  Consult Regarding: Hiatal hernia    HISTORY OF PRESENT ILLNESS:  The patient is a 68 y.o. female  who presented to the ED from her nursing facility for complains of abdominal pain. Labs showed no leukocytosis, she had a lactic acidosis of 2.2 which corrected to 1.6 after resuscitation. CT scan did not show anything acute in her abdomen. It did show a hiatal hernia. GI requested surgery be consulted for the hiatal hernia. Patients main complaint is supra pubic pain and the inability to urinate. Patient states that her GERD is controlled with Protonix and tums. Patient states that she does have intermittent nausea with a few episode of emesis recently. NO blood in her emesis. Patient states that she was recently constipated but had a suppository and then had a large amount of BMs. GI planning on colonoscopy to investigate thickening of her colon seen on CT scan. Patient states that she does not wish to have any surgical procedures.       Past Medical History:        Diagnosis Date    A-fib ([address])     Acquired absence of kidney     Adult hypertrophic pyloric stenosis     Agoraphobia with panic disorder     Agoraphobia without history of panic disorder     Allergic rhinitis     Anemia, unspecified     Anxiety     Anxiety disorder     Arthritis     Atrial fibrillation (HCC)     Back pain     Bronchitis     Caffeine use     8 coffee / day    Cardiomegaly     Carpal tunnel syndrome     Cataracts, bilateral     Centriacinar emphysema (HCC)     Chronic kidney disease, stage III (moderate) (HCC)     Chronic pain     Constipation     Constipation     COPD (chronic obstructive pulmonary disease) (HCC)     Emphysema    Cystitis with hematuria     Depression 07/18/2023 09:01 AM    MCHC 31.8 07/18/2023 09:01 AM    RDW 14.3 07/18/2023 09:01 AM    NRBC 1 06/02/2020 06:05 AM    LYMPHOPCT 17 07/18/2023 09:01 AM    MONOPCT 7 07/18/2023 09:01 AM    BASOPCT 1 07/18/2023 09:01 AM    MONOSABS 0.52 07/18/2023 09:01 AM    LYMPHSABS 1.19 07/18/2023 09:01 AM    EOSABS 0.27 07/18/2023 09:01 AM    BASOSABS 0.05 07/18/2023 09:01 AM    DIFFTYPE NOT REPORTED 12/17/2021 10:05 PM     BMP:    Lab Results   Component Value Date/Time     07/18/2023 09:01 AM    K 3.9 07/18/2023 09:01 AM     07/18/2023 09:01 AM    CO2 21 07/18/2023 09:01 AM    BUN 14 07/18/2023 09:01 AM    LABALBU 4.2 07/18/2023 09:01 AM    CREATININE 1.1 07/18/2023 09:01 AM    CALCIUM 9.2 07/18/2023 09:01 AM    GFRAA >60 08/17/2022 06:17 PM    LABGLOM 52 07/18/2023 09:01 AM    GLUCOSE 153 07/18/2023 09:01 AM       Pertinent Radiology:   XR CHEST (2 VW)    Result Date: 7/18/2023  EXAMINATION: TWO XRAY VIEWS OF THE CHEST 7/18/2023 10:16 am COMPARISON: 02/03/2023 HISTORY: ORDERING SYSTEM PROVIDED HISTORY: upper abd pain TECHNOLOGIST PROVIDED HISTORY: upper abd pain Reason for Exam: Erect, abd pain FINDINGS: The lungs are without acute focal process. There is no effusion or pneumothorax. The cardiomediastinal silhouette is stable. The osseous structures are stable. No acute process. CT ABDOMEN PELVIS W IV CONTRAST Additional Contrast? None    Result Date: 7/18/2023  EXAMINATION: CT OF THE ABDOMEN AND PELVIS WITH CONTRAST 7/18/2023 10:06 am TECHNIQUE: CT of the abdomen and pelvis was performed with the administration of intravenous contrast. Multiplanar reformatted images are provided for review. Automated exposure control, iterative reconstruction, and/or weight based adjustment of the mA/kV was utilized to reduce the radiation dose to as low as reasonably achievable.  COMPARISON: CT of the abdomen and pelvis from 02/03/2023 HISTORY: ORDERING SYSTEM PROVIDED HISTORY: [facility] abdominal pain TECHNOLOGIST PROVIDED

## 2023-07-18 NOTE — ED NOTES
Pt resting on Cot,     VSS, RR equal and non labored, NAD, pt is alert and oriented x4  Pt on full cardiac monitoring    Pt voices no additional concerns  Call light within reach  Following plan of care       To Smith RN  07/18/23 [id number] Darrell Boyer RN  07/18/23 7140

## 2023-07-18 NOTE — ED TRIAGE NOTES
Pt arrived to ED from EMS for bought's of nausea and emesis. Pt is tearful on arrival and states she has had a bowel movement since Sunday with pain to the right groin area that started yesterday. Pt was seen here a few days ago for a fall    VSS, RR equal and non labored, NAD, pt is alert and oriented x4    Call light within reach, pt changed into gown, and placed on monitor.  IV line started a labs drawn    Following plan of care

## 2023-07-18 NOTE — ED NOTES
Pt resting on Cot,     VSS, RR equal and non labored, NAD, pt is alert and oriented x4  Pt on full cardiac monitoring    Pt voices no additional concerns  Call light within reach  Following plan of care          Michelle Marc RN  07/18/23 3663

## 2023-07-18 NOTE — H&P
[address]  CDU / OBSERVATION ENCOUNTER  RESIDENT NOTE     Pt Name: Thad Aguilar  MRN: [id number]  [address] [facility] 1945  Date of evaluation: 7/18/23  Patient's PCP is : No primary care provider on file. CHIEF COMPLAINT       Chief Complaint   Patient presents with    Nausea    Emesis     X2 days         HISTORY OF PRESENT ILLNESS    Thad Aguilar is a 68 y.o. female who presents with complaints of nausea and abdominal discomfort ongoing for approximately 2 days. Patient also complains of inability to urinate with bladder discomfort. Patient is very anxious upon examination. Patient has a history of pyloric stenosis, gastric fundoplication and GERD. She currently denies chest pain, shortness of breath, fever and chills    Location/Symptom: Abdominal pain, nausea  Timing/Onset: Approximately 2 days  Provocation: Unknown  Quality: Dull  Radiation: Nonradiating  Severity: 6/10  Timing/Duration: Persistent  Modifying Factors: n/a    History was obtained in part through review of the ED chart. When possible, a direct discussion was had with ED nurses, residents, and attendings        REVIEW OF SYSTEMS       Review of Systems   Constitutional:  Negative for chills, fatigue and fever. Respiratory:  Negative for cough and shortness of breath. Cardiovascular:  Negative for chest pain. Gastrointestinal:  Positive for abdominal pain and nausea. Negative for constipation and diarrhea. Genitourinary:  Positive for decreased urine volume. Negative for difficulty urinating. Musculoskeletal:  Negative for arthralgias, back pain and myalgias. Neurological:  Negative for dizziness and headaches. Psychiatric/Behavioral:  The patient is nervous/anxious. All other systems reviewed and are negative. (PQRS) Advance directives on face sheet per hospital policy.  No change unless specifically mentioned in chart    PAST MEDICAL HISTORY    has a past medical history of A-fib ([address]), Acquired condition->Emergency Medical Condition (MA) Reason for Exam: RLQ abdominal pain FINDINGS: Lower Chest: Dependent/atelectatic/fibrotic changes lung bases, more on the left. Moderate hiatus hernia with mural thickening distal esophagus. Small amount of fluid distal esophageal lumen. Mild LA enlargement. Organs: Mild hepatomegaly (20.5 cm craniocaudally, vs 17.5 previously) with probable steatosis; no focal abnormality. No dilatation biliary tree. Surgical absence gallbladder. Mild pancreatic ductal dilatation but pancreas otherwise WNL. Spleen nonenlarged without focal abnormality. No adrenal mass. Solitary left kidney with probable small cysts but no stones or suspicious focal abnormality. GI/Bowel: Redundant appearing large bowel with moderate retained stool but no abnormal dilatation. Nondistended ascending colon again demonstrates mural prominence, similar to the previous study; no significant pericolonic fat stranding. No definite focal mass. Small-bowel unchanged/unremarkable. J-shaped stomach with some residual fluid/debris/food. Unremarkable duodenum. Appendix surgically absent. Pelvis: Considerable artifact from right total hip replacement hardware. Nondistended urinary bladder without obvious mass or stones. Surgical absence uterus. No pelvic fluid collection or mass. Peritoneum/Retroperitoneum: Heavy calcific ASVD visualized aorta and iliac arteries, without aneurysm. No bulky lymphadenopathy. No pathologic free fluid or free air. Bones/Soft Tissues: Convex-left curvature and multilevel DJD/DDD changes thoracic and lumbar spine, especially facet arthrosis, without interval change. Osteopenia. No acute CT finding in the abdomen or pelvis. No bowel obstruction. Similar mural prominence nondistended ascending colon; milder subacute colitis should again be considered, but no overt signs. Otherwise redundant colon with moderate retained stool.  Slightly larger liver now with mild

## 2023-07-18 NOTE — ED NOTES
Pt resting on Cot,     VSS, RR equal and non labored, NAD, pt is alert and oriented x4  Pt on full cardiac monitoring    Pt voices no additional concerns  Call light within reach  Following plan of care       Nereida Clarke RN  07/18/23 0845

## 2023-07-19 PROBLEM — R10.9 ABDOMINAL PAIN: Status: RESOLVED | Noted: 2023-07-18 | Resolved: 2023-07-19

## 2023-07-19 PROBLEM — K44.9 PARAESOPHAGEAL HERNIA: Status: ACTIVE | Noted: 2023-07-19

## 2023-07-19 PROBLEM — Z86.59 HISTORY OF DEPRESSION: Status: ACTIVE | Noted: 2023-07-19

## 2023-07-19 LAB
BACTERIA URNS QL MICRO: ABNORMAL
BILIRUB UR QL STRIP: NEGATIVE
CASTS #/AREA URNS LPF: ABNORMAL /LPF (ref 0–2)
CASTS #/AREA URNS LPF: ABNORMAL /LPF (ref 0–2)
CLARITY UR: CLEAR
COLOR UR: NORMAL
CRP SERPL HS-MCNC: 11.9 MG/L (ref 0–5)
EPI CELLS #/AREA URNS HPF: ABNORMAL /HPF (ref 0–5)
ERYTHROCYTE [SEDIMENTATION RATE] IN BLOOD BY PHOTOMETRIC METHOD: 7 MM/HR (ref 0–30)
GLUCOSE UR STRIP-MCNC: NEGATIVE MG/DL
HGB UR QL STRIP.AUTO: NEGATIVE
KETONES UR STRIP-MCNC: NEGATIVE MG/DL
LEUKOCYTE ESTERASE UR QL STRIP: NEGATIVE
NITRITE UR QL STRIP: NEGATIVE
PH UR STRIP: 6 [PH] (ref 5–8)
PROCALCITONIN SERPL-MCNC: 0.08 NG/ML
PROT UR STRIP-MCNC: NEGATIVE MG/DL
RBC #/AREA URNS HPF: ABNORMAL /HPF (ref 0–2)
SP GR UR STRIP: 1.01 (ref 1–1.03)
UROBILINOGEN UR STRIP-ACNC: NORMAL EU/DL (ref 0–1)
WBC #/AREA URNS HPF: ABNORMAL /HPF (ref 0–5)

## 2023-07-19 PROCEDURE — 2580000003 HC RX 258

## 2023-07-19 PROCEDURE — 6370000000 HC RX 637 (ALT 250 FOR IP)

## 2023-07-19 PROCEDURE — 6360000002 HC RX W HCPCS: Performed by: EMERGENCY MEDICINE

## 2023-07-19 PROCEDURE — 6370000000 HC RX 637 (ALT 250 FOR IP): Performed by: EMERGENCY MEDICINE

## 2023-07-19 PROCEDURE — 99223 1ST HOSP IP/OBS HIGH 75: CPT | Performed by: INTERNAL MEDICINE

## 2023-07-19 PROCEDURE — 51701 INSERT BLADDER CATHETER: CPT

## 2023-07-19 PROCEDURE — 36415 COLL VENOUS BLD VENIPUNCTURE: CPT

## 2023-07-19 PROCEDURE — 84145 PROCALCITONIN (PCT): CPT

## 2023-07-19 PROCEDURE — 86140 C-REACTIVE PROTEIN: CPT

## 2023-07-19 PROCEDURE — 85652 RBC SED RATE AUTOMATED: CPT

## 2023-07-19 PROCEDURE — 99222 1ST HOSP IP/OBS MODERATE 55: CPT | Performed by: SURGERY

## 2023-07-19 PROCEDURE — 81001 URINALYSIS AUTO W/SCOPE: CPT

## 2023-07-19 PROCEDURE — 51798 US URINE CAPACITY MEASURE: CPT

## 2023-07-19 PROCEDURE — 1200000000 HC SEMI PRIVATE

## 2023-07-19 PROCEDURE — 6360000002 HC RX W HCPCS

## 2023-07-19 RX ORDER — HYDRALAZINE HYDROCHLORIDE 20 MG/ML
10 INJECTION INTRAMUSCULAR; INTRAVENOUS EVERY 6 HOURS PRN
Status: DISCONTINUED | OUTPATIENT
Start: 2023-07-19 | End: 2023-07-26 | Stop reason: HOSPADM

## 2023-07-19 RX ORDER — OXYCODONE HYDROCHLORIDE AND ACETAMINOPHEN 5; 325 MG/1; MG/1
2 TABLET ORAL EVERY 6 HOURS PRN
Status: DISCONTINUED | OUTPATIENT
Start: 2023-07-19 | End: 2023-07-22

## 2023-07-19 RX ORDER — CIPROFLOXACIN 500 MG/1
500 TABLET, FILM COATED ORAL EVERY 12 HOURS SCHEDULED
Status: COMPLETED | OUTPATIENT
Start: 2023-07-19 | End: 2023-07-21

## 2023-07-19 RX ORDER — FENTANYL CITRATE 50 UG/ML
25 INJECTION, SOLUTION INTRAMUSCULAR; INTRAVENOUS
Status: DISCONTINUED | OUTPATIENT
Start: 2023-07-19 | End: 2023-07-24

## 2023-07-19 RX ORDER — METOPROLOL SUCCINATE 50 MG/1
50 TABLET, EXTENDED RELEASE ORAL DAILY
Status: DISCONTINUED | OUTPATIENT
Start: 2023-07-20 | End: 2023-07-26 | Stop reason: HOSPADM

## 2023-07-19 RX ORDER — METRONIDAZOLE 500 MG/1
500 TABLET ORAL EVERY 8 HOURS SCHEDULED
Status: COMPLETED | OUTPATIENT
Start: 2023-07-19 | End: 2023-07-24

## 2023-07-19 RX ADMIN — FENTANYL CITRATE 25 MCG: 50 INJECTION, SOLUTION INTRAMUSCULAR; INTRAVENOUS at 09:32

## 2023-07-19 RX ADMIN — AMITRIPTYLINE HYDROCHLORIDE 25 MG: 25 TABLET, FILM COATED ORAL at 09:23

## 2023-07-19 RX ADMIN — Medication 600 MG: at 21:28

## 2023-07-19 RX ADMIN — TRAZODONE HYDROCHLORIDE 100 MG: 100 TABLET ORAL at 21:27

## 2023-07-19 RX ADMIN — BUSPIRONE HYDROCHLORIDE 10 MG: 10 TABLET ORAL at 21:27

## 2023-07-19 RX ADMIN — METRONIDAZOLE 500 MG: 500 TABLET, FILM COATED ORAL at 21:27

## 2023-07-19 RX ADMIN — BUSPIRONE HYDROCHLORIDE 10 MG: 10 TABLET ORAL at 09:23

## 2023-07-19 RX ADMIN — GABAPENTIN 300 MG: 300 CAPSULE ORAL at 21:27

## 2023-07-19 RX ADMIN — FAMOTIDINE 20 MG: 20 TABLET, FILM COATED ORAL at 09:23

## 2023-07-19 RX ADMIN — PANTOPRAZOLE SODIUM 40 MG: 40 TABLET, DELAYED RELEASE ORAL at 14:24

## 2023-07-19 RX ADMIN — ENOXAPARIN SODIUM 40 MG: 100 INJECTION SUBCUTANEOUS at 09:28

## 2023-07-19 RX ADMIN — TROSPIUM CHLORIDE 20 MG: 20 TABLET, FILM COATED ORAL at 14:24

## 2023-07-19 RX ADMIN — CIPROFLOXACIN 500 MG: 500 TABLET, FILM COATED ORAL at 21:27

## 2023-07-19 RX ADMIN — CLONAZEPAM 0.5 MG: 0.5 TABLET ORAL at 21:46

## 2023-07-19 RX ADMIN — GABAPENTIN 300 MG: 300 CAPSULE ORAL at 14:24

## 2023-07-19 RX ADMIN — ESCITALOPRAM OXALATE 20 MG: 10 TABLET ORAL at 09:23

## 2023-07-19 RX ADMIN — GABAPENTIN 300 MG: 300 CAPSULE ORAL at 09:23

## 2023-07-19 RX ADMIN — METRONIDAZOLE 500 MG: 500 TABLET, FILM COATED ORAL at 15:33

## 2023-07-19 RX ADMIN — DESMOPRESSIN ACETATE 40 MG: 0.2 TABLET ORAL at 09:23

## 2023-07-19 RX ADMIN — QUETIAPINE FUMARATE 50 MG: 25 TABLET ORAL at 21:28

## 2023-07-19 RX ADMIN — SODIUM CHLORIDE, PRESERVATIVE FREE 10 ML: 5 INJECTION INTRAVENOUS at 22:07

## 2023-07-19 RX ADMIN — AMITRIPTYLINE HYDROCHLORIDE 25 MG: 25 TABLET, FILM COATED ORAL at 14:24

## 2023-07-19 RX ADMIN — FENTANYL CITRATE 25 MCG: 50 INJECTION, SOLUTION INTRAMUSCULAR; INTRAVENOUS at 15:39

## 2023-07-19 RX ADMIN — OXYCODONE HYDROCHLORIDE AND ACETAMINOPHEN 2 TABLET: 5; 325 TABLET ORAL at 21:35

## 2023-07-19 RX ADMIN — FERROUS SULFATE TAB EC 325 MG (65 MG FE EQUIVALENT) 325 MG: 325 (65 FE) TABLET DELAYED RESPONSE at 09:23

## 2023-07-19 RX ADMIN — PANTOPRAZOLE SODIUM 40 MG: 40 TABLET, DELAYED RELEASE ORAL at 09:23

## 2023-07-19 RX ADMIN — Medication 600 MG: at 09:23

## 2023-07-19 RX ADMIN — CETIRIZINE HYDROCHLORIDE 5 MG: 10 TABLET ORAL at 09:23

## 2023-07-19 RX ADMIN — HYPROMELLOSE 2910 2 DROP: 5 SOLUTION OPHTHALMIC at 15:34

## 2023-07-19 RX ADMIN — ONDANSETRON 4 MG: 4 TABLET, ORALLY DISINTEGRATING ORAL at 09:40

## 2023-07-19 RX ADMIN — AMITRIPTYLINE HYDROCHLORIDE 25 MG: 25 TABLET, FILM COATED ORAL at 21:27

## 2023-07-19 RX ADMIN — OXYCODONE HYDROCHLORIDE AND ACETAMINOPHEN 2 TABLET: 5; 325 TABLET ORAL at 12:55

## 2023-07-19 RX ADMIN — METOPROLOL SUCCINATE 25 MG: 25 TABLET, FILM COATED, EXTENDED RELEASE ORAL at 09:23

## 2023-07-19 RX ADMIN — SODIUM CHLORIDE, PRESERVATIVE FREE 10 ML: 5 INJECTION INTRAVENOUS at 09:38

## 2023-07-19 ASSESSMENT — PAIN - FUNCTIONAL ASSESSMENT: PAIN_FUNCTIONAL_ASSESSMENT: PREVENTS OR INTERFERES SOME ACTIVE ACTIVITIES AND ADLS

## 2023-07-19 ASSESSMENT — PAIN DESCRIPTION - LOCATION
LOCATION: GROIN;ABDOMEN
LOCATION: GROIN;ABDOMEN

## 2023-07-19 ASSESSMENT — PAIN SCALES - GENERAL
PAINLEVEL_OUTOF10: 7
PAINLEVEL_OUTOF10: 9
PAINLEVEL_OUTOF10: 8
PAINLEVEL_OUTOF10: 8
PAINLEVEL_OUTOF10: 5

## 2023-07-19 ASSESSMENT — PAIN DESCRIPTION - DESCRIPTORS: DESCRIPTORS: SPASM

## 2023-07-19 ASSESSMENT — PAIN DESCRIPTION - ORIENTATION: ORIENTATION: RIGHT

## 2023-07-19 NOTE — H&P
[address]     Department of Internal Medicine - Staff Internal Medicine Teaching Service          ADMISSION NOTE/HISTORY AND PHYSICAL EXAMINATION   Date: 7/19/2023  Patient Name: Carmel Duane  Date of admission: 7/18/2023  8:42 AM  YOB: 1945  PCP: No primary care provider on file. History Obtained From:  patient    CHIEF COMPLAINT     Chief complaint: abdominal pain and nausea    HISTORY OF PRESENTING ILLNESS     The patient is a pleasant 68 y.o. female with a PMHx significant for    COPD  hypertension  GERD s/p nissen fundoplication. presents with a chief complaint of nausea and abdominal pain since 2 days. Right sided and lower abdominal pain which is constant, non radiating, no changes in relation to food intake. Complaints of vomiting, non blood stained. Complaints of constipation last bowel movement 3 days ago. Had urinary retention on admission which is now resolved. No complaints of abdominal distension, diarrhea, jaundice, hematochezia or melena  Patient denies any fever, chills, urinary urgency or frequency, back pain. CT abdomen and pelvis showed subacute colitis and large hiatal hernia. Urinalysis was negative. Lactic acid 2.2.       Review of Systems:  General ROS: Completed and except as mentioned above were negative   HEENT ROS: Completed and except as mentioned above were negative   Allergy and Immunology ROS:  Completed and except as mentioned above were negative  Hematological and Lymphatic ROS:  Completed and except as mentioned above were negative  Respiratory ROS:  Completed and except as mentioned above were negative  Cardiovascular ROS:  Completed and except as mentioned above were negative  Gastrointestinal ROS: Completed and except as mentioned above were negative  Genito-Urinary ROS:  Completed and except as mentioned above were negative  Musculoskeletal ROS:  Completed and except as mentioned above were negative  Neurological ROS:

## 2023-07-19 NOTE — ED NOTES
Report called to shantelle on 3c.  All questions answered     [address], RN  07/18/23 6795

## 2023-07-19 NOTE — CONSULTS
[facility]) Gastroenterology  Consultation Note     . Chief Complaint:  Nausea and vomiting    Reason for consult:    Colitis    History of present illness: This is a 68 y.o. female with complicated PMH as noted below who presented with complaints of nausea, vomiting and diffuse lower abdominal pain nonradiating in nature that patient describes as sharp and constant present for 48 hours prior to arrival.  Patient denies any bloody stools or hematemesis. No fevers or chills. No diarrhea. In the ED patient had CT scan abdomen and pelvis that showed no acute findings, no bowel obstruction. Stable subacute colitis. Attic steatosis. Low hiatal hernia with mural prominence including distal esophagus with small amount of luminal fluid. Patient is status post Nissen. Patient was found to have UTI and started on antibiotics  And also complaining of significant abdominal pain  Of note patient was just seen by  in the office on 6/22 due to chronic abdominal pain.   Plan is for outpatient EGD with flex sig-apparently this has not been scheduled at this time      Previous GI history:   See epic note for  Is no family history of GI malignancy  No drinking drug use NSAID use or recent weight loss    Past Medical/Social/Family History:  Past Medical History:   Diagnosis Date    A-fib ([address])     Acquired absence of kidney     Adult hypertrophic pyloric stenosis     Agoraphobia with panic disorder     Agoraphobia without history of panic disorder     Allergic rhinitis     Anemia, unspecified     Anxiety     Anxiety disorder     Arthritis     Atrial fibrillation (HCC)     Back pain     Bronchitis     Caffeine use     8 coffee / day    Cardiomegaly     Carpal tunnel syndrome     Cataracts, bilateral     Centriacinar emphysema ([address])     Chronic kidney disease, stage III (moderate) (HCC)     Chronic pain     Constipation     Constipation     COPD (chronic obstructive pulmonary disease) (HCC)     Emphysema OR WITH GI STAFF**ERCP SPHINCTER/PAPILLOTOMY performed by Debo Block MD at [facility]    ERCP  05/21/2020    ** CASE IN OR WITH GI STAFF**ERCP DILATION BALLOON performed by Debo Block MD at [facility]    ERCP N/A 2/8/2021    ERCP STENT REMOVAL performed by Honey Blake MD at [facility]    ERCP  2/8/2021    ERCP STONE REMOVAL [facility] Honey Blake MD [facility] [address] [facility]    GASTRIC FUNDOPLICATION N/A 34/21/5144    XI LAPAROSCOPIC ROBOTIC [facility], CHERYL FUNDOPLICATION performed by Octavio Lane MD at [address] [facility] 03/27/2020    EGD PEG TUBE PLACEMENT performed by Octavio Lane MD at [address] [facility] N/A 2/8/2021    **CASE IN O.R. W/ GI STAFF - EGD WITH REMOVAL PEG TUBE performed by Honey Blake MD at [facility]    HAND SURGERY      [facility] CATH POWER PICC TRIPLE  03/04/2020         HERNIA REPAIR      Hiatal hernia    HYSTERECTOMY (CERVIX STATUS UNKNOWN)      Abdominal    IR NONTUNNELED VASCULAR CATHETER  5/10/2022    IR NONTUNNELED VASCULAR CATHETER 5/10/2022 Mallory Weaver MD [facility]    JOINT REPLACEMENT Right     right hip    OVARY REMOVAL      RHINOPLASTY      TONSILLECTOMY      TOTAL HIP ARTHROPLASTY      TOTAL NEPHRECTOMY      atrophic from infections, age 13    TRACHEOSTOMY N/A 03/27/2020    **ADD ON **WANTS 10:00AM **TRACHEOTOMY performed by Octavio Lane MD at [facility] N/A 04/02/2020    TRACHEOTOMY EXCHANGE performed by Octavio Lane MD at [facility] 03/17/2020    BEDSIDE EGD ESOPHAGOGASTRODUODENOSCOPY (ICU) performed by Octavio Lane MD at [address] [facility] 05/18/2020    EGD BIOPSY performed by Debo Block MD at [address] [facility]  05/18/2020    EGD DILATION BALLOON performed by Debo Block MD at [address] [facility]

## 2023-07-19 NOTE — CARE COORDINATION
Case Management Assessment  Initial Evaluation    Date/Time of Evaluation: 7/19/2023 3:15 PM  Assessment Completed by: Lizeth Arechiga RN    If patient is discharged prior to next notation, then this note serves as note for discharge by case management. Patient Name: Alysha Patel                   YOB: 1945  Diagnosis: Abdominal pain [R10.9]  Nausea and vomiting, unspecified vomiting type [R11.2]  Intractable abdominal pain [R10.9]                   Date / Time: 7/18/2023  8:42 AM    Patient Admission Status: Inpatient   Readmission Risk (Low < 19, Mod (19-27), High > 27): Readmission Risk Score: 18.4    Current PCP: No primary care provider on file. PCP verified by CM? Yes (Dr Mt Jones)    Chart Reviewed: Yes      History Provided by: Patient  Patient Orientation:  (not assessed)    Patient Cognition: Alert    Hospitalization in the last 30 days (Readmission):  No    If yes, Readmission Assessment in CM Navigator will be completed. Advance Directives:      Code Status: Full Code   Patient's Primary Decision Maker is:      Primary Decision Maker: SOBEIDA CHAVEZ - Child - [phone]    Discharge Planning:    Patient lives with:   Type of Home: [address]  Primary Care Giver: Other (Comment) (SNF [place])  Patient Support Systems include: Family Members     Current community resources: ECF/Home Care  Current services prior to admission: [address]            Current DME:  wc           ADLS  Prior functional level: Assistance with the following: (ADL'S)  Current functional level: Assistance with the following: (ADL'S)    PT AM-PAC:   /24  OT AM-PAC:   /24    Family can provide assistance at DC:  Other (comment) (SNF)    Plans to Return to Present Housing: Yes  Other Identified Issues/Barriers to RETURNING to current housing: none  Potential Assistance needed at discharge: Transportation, [facility]            Patient expects to discharge to:

## 2023-07-19 NOTE — ED NOTES
Ticket to ride printed and given to [facility] to transport pt to Room [address] [place] [address]  07/18/23 1148

## 2023-07-19 NOTE — ED NOTES
Pt reporting the need to urinate, but states she can't go. Writer bladder scanned pt finding 584 ml in bladder. Obs resident notified and told writer to straight cath pt.      [address], RN  07/18/23 2017

## 2023-07-20 PROBLEM — R11.2 NAUSEA AND VOMITING: Status: ACTIVE | Noted: 2023-07-20

## 2023-07-20 PROCEDURE — 2580000003 HC RX 258

## 2023-07-20 PROCEDURE — 6370000000 HC RX 637 (ALT 250 FOR IP): Performed by: EMERGENCY MEDICINE

## 2023-07-20 PROCEDURE — 51798 US URINE CAPACITY MEASURE: CPT

## 2023-07-20 PROCEDURE — 6370000000 HC RX 637 (ALT 250 FOR IP)

## 2023-07-20 PROCEDURE — 99232 SBSQ HOSP IP/OBS MODERATE 35: CPT | Performed by: INTERNAL MEDICINE

## 2023-07-20 PROCEDURE — 6360000002 HC RX W HCPCS

## 2023-07-20 PROCEDURE — 1200000000 HC SEMI PRIVATE

## 2023-07-20 RX ORDER — SENNA AND DOCUSATE SODIUM 50; 8.6 MG/1; MG/1
2 TABLET, FILM COATED ORAL DAILY PRN
Status: DISCONTINUED | OUTPATIENT
Start: 2023-07-20 | End: 2023-07-21

## 2023-07-20 RX ADMIN — ESCITALOPRAM OXALATE 20 MG: 10 TABLET ORAL at 08:59

## 2023-07-20 RX ADMIN — AMITRIPTYLINE HYDROCHLORIDE 25 MG: 25 TABLET, FILM COATED ORAL at 08:59

## 2023-07-20 RX ADMIN — FERROUS SULFATE TAB EC 325 MG (65 MG FE EQUIVALENT) 325 MG: 325 (65 FE) TABLET DELAYED RESPONSE at 08:59

## 2023-07-20 RX ADMIN — ONDANSETRON 4 MG: 2 INJECTION INTRAMUSCULAR; INTRAVENOUS at 17:23

## 2023-07-20 RX ADMIN — Medication 600 MG: at 21:23

## 2023-07-20 RX ADMIN — FAMOTIDINE 20 MG: 20 TABLET, FILM COATED ORAL at 08:59

## 2023-07-20 RX ADMIN — GABAPENTIN 300 MG: 300 CAPSULE ORAL at 08:58

## 2023-07-20 RX ADMIN — OXYCODONE HYDROCHLORIDE AND ACETAMINOPHEN 2 TABLET: 5; 325 TABLET ORAL at 13:33

## 2023-07-20 RX ADMIN — CLONAZEPAM 0.5 MG: 0.5 TABLET ORAL at 10:50

## 2023-07-20 RX ADMIN — SODIUM CHLORIDE, PRESERVATIVE FREE 10 ML: 5 INJECTION INTRAVENOUS at 21:24

## 2023-07-20 RX ADMIN — METOPROLOL SUCCINATE 50 MG: 50 TABLET, EXTENDED RELEASE ORAL at 08:58

## 2023-07-20 RX ADMIN — CLONAZEPAM 0.5 MG: 0.5 TABLET ORAL at 21:24

## 2023-07-20 RX ADMIN — HYPROMELLOSE 2910 2 DROP: 5 SOLUTION OPHTHALMIC at 03:25

## 2023-07-20 RX ADMIN — PANTOPRAZOLE SODIUM 40 MG: 40 TABLET, DELAYED RELEASE ORAL at 15:03

## 2023-07-20 RX ADMIN — GABAPENTIN 300 MG: 300 CAPSULE ORAL at 15:03

## 2023-07-20 RX ADMIN — CIPROFLOXACIN 500 MG: 500 TABLET, FILM COATED ORAL at 21:23

## 2023-07-20 RX ADMIN — DOCUSATE SODIUM 50 MG AND SENNOSIDES 8.6 MG 2 TABLET: 8.6; 5 TABLET, FILM COATED ORAL at 13:33

## 2023-07-20 RX ADMIN — OXYCODONE HYDROCHLORIDE AND ACETAMINOPHEN 2 TABLET: 5; 325 TABLET ORAL at 05:25

## 2023-07-20 RX ADMIN — AMITRIPTYLINE HYDROCHLORIDE 25 MG: 25 TABLET, FILM COATED ORAL at 15:03

## 2023-07-20 RX ADMIN — TRAZODONE HYDROCHLORIDE 100 MG: 100 TABLET ORAL at 21:23

## 2023-07-20 RX ADMIN — QUETIAPINE FUMARATE 50 MG: 25 TABLET ORAL at 21:24

## 2023-07-20 RX ADMIN — METRONIDAZOLE 500 MG: 500 TABLET, FILM COATED ORAL at 21:24

## 2023-07-20 RX ADMIN — ONDANSETRON 4 MG: 4 TABLET, ORALLY DISINTEGRATING ORAL at 10:52

## 2023-07-20 RX ADMIN — BUSPIRONE HYDROCHLORIDE 10 MG: 10 TABLET ORAL at 21:23

## 2023-07-20 RX ADMIN — FLUTICASONE PROPIONATE 1 SPRAY: 50 SPRAY, METERED NASAL at 09:59

## 2023-07-20 RX ADMIN — PANTOPRAZOLE SODIUM 40 MG: 40 TABLET, DELAYED RELEASE ORAL at 08:59

## 2023-07-20 RX ADMIN — ENOXAPARIN SODIUM 40 MG: 100 INJECTION SUBCUTANEOUS at 08:58

## 2023-07-20 RX ADMIN — DESMOPRESSIN ACETATE 40 MG: 0.2 TABLET ORAL at 08:59

## 2023-07-20 RX ADMIN — Medication 600 MG: at 08:59

## 2023-07-20 RX ADMIN — CETIRIZINE HYDROCHLORIDE 5 MG: 10 TABLET ORAL at 08:59

## 2023-07-20 RX ADMIN — SODIUM CHLORIDE, PRESERVATIVE FREE 5 ML: 5 INJECTION INTRAVENOUS at 09:00

## 2023-07-20 RX ADMIN — METRONIDAZOLE 500 MG: 500 TABLET, FILM COATED ORAL at 13:33

## 2023-07-20 RX ADMIN — BUSPIRONE HYDROCHLORIDE 10 MG: 10 TABLET ORAL at 08:59

## 2023-07-20 RX ADMIN — METRONIDAZOLE 500 MG: 500 TABLET, FILM COATED ORAL at 05:46

## 2023-07-20 RX ADMIN — CIPROFLOXACIN 500 MG: 500 TABLET, FILM COATED ORAL at 08:59

## 2023-07-20 RX ADMIN — OXYCODONE HYDROCHLORIDE AND ACETAMINOPHEN 2 TABLET: 5; 325 TABLET ORAL at 21:31

## 2023-07-20 RX ADMIN — AMITRIPTYLINE HYDROCHLORIDE 25 MG: 25 TABLET, FILM COATED ORAL at 21:23

## 2023-07-20 RX ADMIN — HYPROMELLOSE 2910 2 DROP: 5 SOLUTION OPHTHALMIC at 15:07

## 2023-07-20 RX ADMIN — GABAPENTIN 300 MG: 300 CAPSULE ORAL at 21:23

## 2023-07-20 RX ADMIN — HYPROMELLOSE 2910 2 DROP: 5 SOLUTION OPHTHALMIC at 08:58

## 2023-07-20 ASSESSMENT — PAIN SCALES - GENERAL
PAINLEVEL_OUTOF10: 8
PAINLEVEL_OUTOF10: 4
PAINLEVEL_OUTOF10: 7
PAINLEVEL_OUTOF10: 5
PAINLEVEL_OUTOF10: 7
PAINLEVEL_OUTOF10: 3

## 2023-07-20 ASSESSMENT — PAIN - FUNCTIONAL ASSESSMENT
PAIN_FUNCTIONAL_ASSESSMENT: PREVENTS OR INTERFERES SOME ACTIVE ACTIVITIES AND ADLS
PAIN_FUNCTIONAL_ASSESSMENT: PREVENTS OR INTERFERES WITH MANY ACTIVE NOT PASSIVE ACTIVITIES
PAIN_FUNCTIONAL_ASSESSMENT: ACTIVITIES ARE NOT PREVENTED

## 2023-07-20 ASSESSMENT — PAIN DESCRIPTION - DESCRIPTORS
DESCRIPTORS: ACHING
DESCRIPTORS: ACHING;THROBBING
DESCRIPTORS: ACHING

## 2023-07-20 ASSESSMENT — PAIN SCALES - WONG BAKER: WONGBAKER_NUMERICALRESPONSE: 2

## 2023-07-20 ASSESSMENT — PAIN DESCRIPTION - LOCATION
LOCATION: HEAD;ABDOMEN
LOCATION: LEG
LOCATION: LEG

## 2023-07-20 ASSESSMENT — PAIN DESCRIPTION - ORIENTATION
ORIENTATION: LEFT;RIGHT
ORIENTATION: RIGHT
ORIENTATION: RIGHT

## 2023-07-20 NOTE — PROGRESS NOTES
Pulses: Normal pulses. Heart sounds: Normal heart sounds. Pulmonary:      Effort: Pulmonary effort is normal.      Breath sounds: Normal breath sounds. Abdominal:      Tenderness: There is abdominal tenderness. Neurological:      General: No focal deficit present. Mental Status: She is alert. Medications:  Scheduled Medications:    metoprolol succinate  50 mg Oral Daily    ciprofloxacin  500 mg Oral 2 times per day    metroNIDAZOLE  500 mg Oral 3 times per day    amitriptyline  25 mg Oral TID    atorvastatin  40 mg Oral Daily    busPIRone  10 mg Oral BID    calcium carbonate  600 mg Oral BID    hypromellose  2 drop Both Eyes Q6H    escitalopram  20 mg Oral Daily    famotidine  20 mg Oral Daily    ferrous sulfate  325 mg Oral Daily with breakfast    fluticasone  1 spray Each Nostril Daily    gabapentin  300 mg Oral TID    cetirizine  5 mg Oral Daily    pantoprazole  40 mg Oral BID AC    QUEtiapine  50 mg Oral Nightly    traZODone  100 mg Oral Nightly    trospium  20 mg Oral BID AC    sodium chloride flush  5-40 mL IntraVENous 2 times per day    enoxaparin  40 mg SubCUTAneous Daily     Continuous Infusions:    sodium chloride       PRN MedicationsfentanNYL, 25 mcg, Q2H PRN  oxyCODONE-acetaminophen, 2 tablet, Q6H PRN  hydrALAZINE, 10 mg, Q6H PRN  albuterol, 2.5 mg, Q6H PRN  bisacodyl, 10 mg, Daily PRN  clonazePAM, 0.5 mg, TID PRN  benzonatate, 100 mg, TID PRN  sodium chloride flush, 5-40 mL, PRN  sodium chloride, , PRN  acetaminophen, 650 mg, Q4H PRN  ondansetron, 4 mg, Q8H PRN   Or  ondansetron, 4 mg, Q6H PRN        Diagnostic Labs:  CBC:   Recent Labs     07/18/23  0901   WBC 7.1   RBC 4.70   HGB 14.3   HCT 45.0   MCV 95.7   RDW 14.3        BMP:   Recent Labs     07/18/23  0901      K 3.9      CO2 21   BUN 14   CREATININE 1.1*     BNP: No results for input(s): BNP in the last 72 hours. PT/INR: No results for input(s): PROTIME, INR in the last 72 hours.   APTT: No

## 2023-07-20 NOTE — PLAN OF CARE
Problem: Pain  Goal: Verbalizes/displays adequate comfort level or baseline comfort level  7/20/2023 0124 by Claudio Berger RN  Outcome: Progressing  7/19/2023 1447 by Farrah Houston RN  Outcome: Progressing     Problem: Safety - Adult  Goal: Free from fall injury  7/20/2023 0124 by Claudio Berger RN  Outcome: Progressing  7/19/2023 1447 by Farrah Houston RN  Outcome: Progressing     Problem: Discharge Planning  Goal: Discharge to home or other facility with appropriate resources  7/20/2023 0124 by Claudio Berger RN  Outcome: Progressing  7/19/2023 1447 by Farrah Houston RN  Outcome: Progressing

## 2023-07-20 NOTE — PROGRESS NOTES
CDU Transfer Summary        Patient:  Carlos Edwards  YOB: 1945    MRN: [id number]   Acct: [de-identified]    Primary Care Physician: No primary care provider on file. Admit date:  7/18/2023  8:42 AM  Transfer date: 7/19/2023    Transfer Diagnoses:     1.)  Abdominal pain, uncertain etiology. Patient known to gastroenterology recommending admission. 2.  Urinary retention, uncertain etiology    3. Oxygen desaturations. Likely meter air. Patient saturating well currently. 4.  Anxiety depression.   Patient with chronic medical conditions and feeling upset at her current condition           Medication List        ASK your doctor about these medications      acetaminophen 500 MG tablet  Commonly known as: TYLENOL     albuterol (2.5 MG/3ML) 0.083% nebulizer solution  Commonly known as: PROVENTIL     aluminum & magnesium hydroxide-simethicone 200-200-20 MG/5ML Susp suspension  Commonly known as: MAALOX     amitriptyline 25 MG tablet  Commonly known as: ELAVIL  Take 1 tablet by mouth in the morning, at noon, and at bedtime     atorvastatin 40 MG tablet  Commonly known as: LIPITOR     bisacodyl 10 MG suppository  Commonly known as: DULCOLAX     busPIRone 10 MG tablet  Commonly known as: BUSPAR  Take 1 tablet by mouth 2 times daily     calcium carbonate 1500 (600 Ca) MG Tabs tablet  Take 1 tablet by mouth 2 times daily     carboxymethylcellulose 1 % ophthalmic solution     clonazePAM 0.5 MG tablet  Commonly known as: KLONOPIN     escitalopram 20 MG tablet  Commonly known as: LEXAPRO     famotidine 20 MG tablet  Commonly known as: PEPCID  Take 1 tablet by mouth 2 times daily     ferrous sulfate 325 (65 Fe) MG EC tablet  Commonly known as: FE TABS 325  Take 1 tablet by mouth daily (with breakfast)     fluticasone 50 MCG/ACT nasal spray  Commonly known as: FLONASE     Folic Acid-Cholecalciferol 1-2000 MG-UNIT Tabs     gabapentin 300 MG capsule  Commonly known as: NEURONTIN     guaiFENesin 600 MG extended

## 2023-07-20 NOTE — PROGRESS NOTES
Physical Therapy  \      Physical Therapy Cancel Note      DATE: 2023    NAME: Amie Christy  MRN: [id number]   : 1945      Patient not seen this date for Physical Therapy due to:    Patient at baseline functional level with no acute PT needs. Will defer PT evaluation at this time. Please reorder PT if future needs arise.        Electronically signed by Francisco Londono PT on 2023 at 2:25 PM

## 2023-07-20 NOTE — DISCHARGE INSTR - COC
Continuity of Care Form    Patient Name: Melida Calvert   :  1945  MRN:  [id number]    Admit date:  2023  Discharge date:  23    Code Status Order: Full Code   Advance Directives:     Admitting Physician:  April Llanos MD  PCP: No primary care provider on file.     Discharging Nurse: [facility] Unit/Room#: 2231/9898-90  Discharging Unit Phone Number: [phone]    Emergency Contact:   Extended Emergency Contact Information  Primary Emergency Contact: Hamilton Ramirez  Home Phone: [phone]  Relation: Child    Past Surgical History:  Past Surgical History:   Procedure Laterality Date    APPENDECTOMY      CARPAL TUNNEL RELEASE      CHOLECYSTECTOMY, LAPAROSCOPIC N/A 2020    XI LAPAROSCOPIC ROBOTIC CHOLECYSTECTOMY, PYLOROPLASTY performed by Danny Muir MD at [address] [place] [address] N/A 2022    COLONOSCOPY POLYPECTOMY HOT performed by Kayleen Richardson MD at [address]      ERCP  2020    with stent insertion, balloon dilation, sphinctereotomy    ERCP  2020    ** CASE IN OR WITY GI STAFF** ERCP STENT INSERTION performed by Kayleen Richardson MD at [facility]    ERCP  2020    ** CASE IN OR WITH GI STAFF**ERCP SPHINCTER/PAPILLOTOMY performed by Kayleen Richardson MD at [address] [place] [address]    ERCP  2020    ** CASE IN OR WITH GI STAFF**ERCP DILATION BALLOON performed by Kayleen Richardson MD at [address] [place] [address]    ERCP N/A 2021    ERCP STENT REMOVAL performed by Ashlie Morris MD at [facility]    ERCP  2021    ERCP STONE REMOVAL performed by Ashlie Morris MD at [facility]    GASTRIC FUNDOPLICATION N/A     XI LAPAROSCOPIC ROBOTIC [address] FUNDOPLICATION performed by Danny Muir MD at [address] [place] [address] 2020    EGD PEG TUBE PLACEMENT performed by Danny Muir MD at [address] [place] [address] 2021    **CASE IN O.R. History:   Immunization History   Administered Date(s) Administered    COVID-19, MODERNA BLUE border, Primary or Immunocompromised, (age 12y+), IM, 100 mcg/0.5mL 01/07/2021, 02/04/2021, 12/25/2021       Active Problems:  Patient Active Problem List   Diagnosis Code    Frequency of urination R35.0    Backache M54.9    GERD (gastroesophageal reflux disease) K21.9    MVP (mitral valve prolapse) I34.1    Depression F32. A    Anxiety F41.9    Urinary retention R33.9    BENEDICT (acute kidney injury) ([address]) N17.9    Dysphagia R13.10    Hiatal hernia K44.9    History of repair of hiatal hernia Z98.890, Z87.19    Centrilobular emphysema (formerly [facility]) J43.2    Esophageal dilatation K22.89    Shock (formerly [facility]) R57.9    Fever R50.9    Critical illness polyneuropathy (formerly [facility]) G62.81    Gastric outlet obstruction K31.1    Recurrent UTI N39.0    Tracheostomy in place ([address]) Z93.0    H/O gastric ulcer Z87.11    Hyperlipidemia E78.5    Essential hypertension I10    Tobacco abuse Z72.0    Chronic respiratory failure with hypoxia (formerly [facility]) J96.11    S/P percutaneous endoscopic gastrostomy (PEG) tube placement (formerly [facility]) Z93.1    S/P Nissen fundoplication (without gastrostomy tube) procedure Z98.890    Anemia due to blood loss D50.0    Phlebitis after infusion T80. 1XXA, I80.9    Esophagitis determined by endoscopy K20.90    Expressive aphasia R47.01    Transient speech disturbance R47.9    Speech disturbance R47.9    Coffee ground emesis K92.0    Acute cystitis without hematuria N30.00    Ulcerative esophagitis K22.10    Septicemia (formerly [facility]) A41.9    Lower abdominal pain R10.30    Ileus (formerly [facility]) K56.7    Chronic pain G89.29    Dyspnea on exertion R06.09    Orthostatic hypotension I95.1    Stage 3 chronic kidney disease (HCC) N18.30    Difficulty walking R26.2    Class 1 obesity in adult E66.9    Generalized abdominal pain R10.84    Multifocal pneumonia J18.9    Other constipation K59.09    Intractable abdominal pain R10.9    ESBL (extended spectrum beta-lactamase) producing

## 2023-07-20 NOTE — PROGRESS NOTES
OBS/CDU   Attending NOTE      Patients PCP is: No primary care provider on file. SUBJECTIVE      Patient is having a difficult morning. Patient had noted urinary retention. On urinary catheterization patient had 500 cc of urine return. Patient was having ongoing nausea and abdominal discomfort. Abdominal pain was of uncertain etiology but CT scanning did show evidence of a sending colitis. Patient had been given more oxygen overnight with oxygen saturations having apparently dropped in the 80s. Patient was complaining of feeling frightened and scared and anxious more than anything but with also lower abdominal pain. Patient has known hiatal hernia. Patient had difficult hospital course with last attempt at repair. Patient was admitted to medicine service due to increased abdominal pain, uncertain etiology, inability to handle p.o., patient age, morbidities, urinary retention, and need for ongoing work-up. PHYSICAL EXAM      General: Appears uncomfortable  Heent: EMOI, PERRL  Neck: SUPPLE, NO JVD  Cardiovascular: RRR, S1S2  Pulmonary: CTAB, NO SOB  Abdomen: Lower abdominal pain and tenderness  Extremities: +2/4 PULSES DISTAL, NO SWELLING  Neuro / Psych: NO NUMBNESS OR TINGLING, MENTATION AT BASELINE    PERTINENT TEST /EXAMS      I have reviewed all available laboratory results.     MEDICATIONS CURRENT   fentaNYL (SUBLIMAZE) injection 25 mcg, Q2H PRN  oxyCODONE-acetaminophen (PERCOCET) 5-325 MG per tablet 2 tablet, Q6H PRN  [START ON 7/20/2023] metoprolol succinate (TOPROL XL) extended release tablet 50 mg, Daily  ciprofloxacin (CIPRO) tablet 500 mg, 2 times per day  metroNIDAZOLE (FLAGYL) tablet 500 mg, 3 times per day  hydrALAZINE (APRESOLINE) injection 10 mg, Q6H PRN  albuterol (PROVENTIL) (2.5 MG/3ML) 0.083% nebulizer solution 2.5 mg, Q6H PRN  amitriptyline (ELAVIL) tablet 25 mg, TID  atorvastatin (LIPITOR) tablet 40 mg, Daily  bisacodyl (DULCOLAX) suppository 10 mg, Daily PRN  busPIRone (BUSPAR) tablet 10 mg, BID  calcium carbonate tablet 600 mg, BID  hypromellose (ISOPTO TEARS) 0.5 % ophthalmic solution 2 drop, Q6H  clonazePAM (KLONOPIN) tablet 0.5 mg, TID PRN  escitalopram (LEXAPRO) tablet 20 mg, Daily  famotidine (PEPCID) tablet 20 mg, Daily  ferrous sulfate (FE TABS 325) EC tablet 325 mg, Daily with breakfast  fluticasone (FLONASE) 50 MCG/ACT nasal spray 1 spray, Daily  gabapentin (NEURONTIN) capsule 300 mg, TID  cetirizine (ZYRTEC) tablet 5 mg, Daily  pantoprazole (PROTONIX) tablet 40 mg, BID AC  QUEtiapine (SEROQUEL) tablet 50 mg, Nightly  traZODone (DESYREL) tablet 100 mg, Nightly  trospium (SANCTURA) tablet 20 mg, BID AC  benzonatate (TESSALON) capsule 100 mg, TID PRN  sodium chloride flush 0.9 % injection 5-40 mL, 2 times per day  sodium chloride flush 0.9 % injection 5-40 mL, PRN  0.9 % sodium chloride infusion, PRN  enoxaparin (LOVENOX) injection 40 mg, Daily  acetaminophen (TYLENOL) tablet 650 mg, Q4H PRN  ondansetron (ZOFRAN-ODT) disintegrating tablet 4 mg, Q8H PRN   Or  ondansetron (ZOFRAN) injection 4 mg, Q6H PRN        All medication charted and reviewed. CONSULTS      IP CONSULT TO GENERAL SURGERY  IP CONSULT TO GI  IP CONSULT TO CASE MANAGEMENT  IP CONSULT TO UROLOGY    ASSESSMENT/PLAN       Nabor Hope is a 68 y.o. female who presents with       Abdominal pain uncertain. Etiology  Discussed with GI service who knows patient well. GI service felt patient required admission for ongoing IV antibiotics and analgesia  Elevated lactate on arrival.  Positive CT scan for colitis on ascending colon. Evaluated by surgery. Not felt to have surgical pathology but rather require GI consultation  Hiatal hernia. Patient with significant difficulty with previous procedures. Patient does not wish to have this repaired. Oxygen desaturation  Etiology uncertain  Responding to nasal cannula1  Possibly due to medication use. Urinary retention  500 cc urine produced.   Uncertain

## 2023-07-20 NOTE — PROGRESS NOTES
[facility])  Occupational Therapy Not Seen Note    DATE: 2023    NAME: Kiko Pascual  MRN: [id number]   : 1945      Patient not seen this date for Occupational Therapy due to:    Patient at baseline functional level with no acute OT needs. Pt educated on purpose of eval and purpose of acute therapy. Pt reported being at baseline with no concerns with complete tasks that pt is able to complete at baseline. Will defer OT evaluation at this time, per pt request for no acute OT. Please reorder OT if future needs arise.     Electronically signed by NAVNEET Leigh/L on 2023 at 11:13 AM

## 2023-07-20 NOTE — CONSULTS
Shanel Felder, [facility], [place], [facility]   Urology Consultation      Patient:  Sotero Randhawa  MRN: [id number]  YOB: 1945    CHIEF COMPLAINT:  Urinary retention     HISTORY OF PRESENT ILLNESS:   The patient is a 68 y.o. female who was admitted for abdominal pain. Urology was consulted for urinary retention. She has had bladder scans of 668 and 575, she hasn't been straight cath'ed because she starting urinating on her own. She has had episodes of urinary retention in the past where she has had to be straight catherized. She does not want a chu. She has a history of OAB, Interstitial cystitis, and recurrent UTIs. She takes sanctura at home. She is also on amitripyline for her IC. 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 Patient also has had history of OAB symptoms, mostly urinary frequency and bladder spasms. She underwent cysto in 11/2017 with Dr. Nati Genao for a CC of urinary frequency. Cystoscopy was negative for any intravesical pathology. She was told to cut back on her excessive overall fluid intake and coffee consumption to limit her frequency. She was instructed to keep a food and urinary symptom diary. She was to return in 6 months for recheck of symptoms, but patient was lost to follow up. She then apparently was seen by [address] [place] [address] in 2018 by Dr. Mira Haas per patient (there is a visit documented as well) for recurrent UTI. \"    Patient's old records, notes and chart reviewed and summarized above.     Past Medical History:    Past Medical History:   Diagnosis Date    A-fib ([facility])     Acquired absence of kidney     Adult hypertrophic pyloric stenosis     Agoraphobia with panic disorder     Agoraphobia without history of panic disorder     Allergic rhinitis     Anemia, unspecified     Anxiety     Anxiety disorder     Arthritis     Atrial fibrillation (HCC)     Back pain nonenlarged without focal abnormality. No adrenal mass. Solitary left kidney with probable small cysts but no stones or suspicious focal abnormality. GI/Bowel: Redundant appearing large bowel with moderate retained stool but no abnormal dilatation. Nondistended ascending colon again demonstrates mural prominence, similar to the previous study; no significant pericolonic fat stranding. No definite focal mass. Small-bowel unchanged/unremarkable. J-shaped stomach with some residual fluid/debris/food. Unremarkable duodenum. Appendix surgically absent. Pelvis: Considerable artifact from right total hip replacement hardware. Nondistended urinary bladder without obvious mass or stones. Surgical absence uterus. No pelvic fluid collection or mass. Peritoneum/Retroperitoneum: Heavy calcific ASVD visualized aorta and iliac arteries, without aneurysm. No bulky lymphadenopathy. No pathologic free fluid or free air. Bones/Soft Tissues: Convex-left curvature and multilevel DJD/DDD changes thoracic and lumbar spine, especially facet arthrosis, without interval change. Osteopenia. No acute CT finding in the abdomen or pelvis. No bowel obstruction. Similar mural prominence nondistended ascending colon; milder subacute colitis should again be considered, but no overt signs. Otherwise redundant colon with moderate retained stool. Slightly larger liver now with mild hepatomegaly. Hepatic steatosis. Surgical absence gallbladder. No dilatation biliary tree. Large hiatus hernia with mural prominence including distal esophagus with small amount of luminal fluid. Correlate clinically. Additional unchanged or incidental findings, as detailed in the body of the report above.        Assessment and Plan   Impression:    68year old male   Active  problem list  Urinary retention- acute on chronic   Hx of OAB   Hx of interstitial cystitis   Recurrent UTIs   Solitary kidney     Plan:   Record PVRs, if PVRs > 400cc please

## 2023-07-21 PROCEDURE — 6370000000 HC RX 637 (ALT 250 FOR IP)

## 2023-07-21 PROCEDURE — 6370000000 HC RX 637 (ALT 250 FOR IP): Performed by: EMERGENCY MEDICINE

## 2023-07-21 PROCEDURE — 2580000003 HC RX 258

## 2023-07-21 PROCEDURE — 1200000000 HC SEMI PRIVATE

## 2023-07-21 PROCEDURE — 99232 SBSQ HOSP IP/OBS MODERATE 35: CPT | Performed by: INTERNAL MEDICINE

## 2023-07-21 PROCEDURE — 6360000002 HC RX W HCPCS

## 2023-07-21 PROCEDURE — 51798 US URINE CAPACITY MEASURE: CPT

## 2023-07-21 RX ORDER — POLYETHYLENE GLYCOL 3350 17 G/17G
17 POWDER, FOR SOLUTION ORAL DAILY
Status: DISCONTINUED | OUTPATIENT
Start: 2023-07-21 | End: 2023-07-21

## 2023-07-21 RX ORDER — LACTULOSE 10 G/15ML
10 SOLUTION ORAL 3 TIMES DAILY
Status: DISCONTINUED | OUTPATIENT
Start: 2023-07-21 | End: 2023-07-21

## 2023-07-21 RX ORDER — SENNA AND DOCUSATE SODIUM 50; 8.6 MG/1; MG/1
2 TABLET, FILM COATED ORAL DAILY
Status: DISCONTINUED | OUTPATIENT
Start: 2023-07-21 | End: 2023-07-24

## 2023-07-21 RX ADMIN — POLYETHYLENE GLYCOL 3350, SODIUM SULFATE ANHYDROUS, SODIUM BICARBONATE, SODIUM CHLORIDE, POTASSIUM CHLORIDE 4000 ML: 236; 22.74; 6.74; 5.86; 2.97 POWDER, FOR SOLUTION ORAL at 18:16

## 2023-07-21 RX ADMIN — ENOXAPARIN SODIUM 40 MG: 100 INJECTION SUBCUTANEOUS at 09:55

## 2023-07-21 RX ADMIN — GABAPENTIN 300 MG: 300 CAPSULE ORAL at 09:51

## 2023-07-21 RX ADMIN — DESMOPRESSIN ACETATE 40 MG: 0.2 TABLET ORAL at 09:47

## 2023-07-21 RX ADMIN — SODIUM CHLORIDE, PRESERVATIVE FREE 10 ML: 5 INJECTION INTRAVENOUS at 21:19

## 2023-07-21 RX ADMIN — CLONAZEPAM 0.5 MG: 0.5 TABLET ORAL at 07:00

## 2023-07-21 RX ADMIN — CIPROFLOXACIN 500 MG: 500 TABLET, FILM COATED ORAL at 09:51

## 2023-07-21 RX ADMIN — METRONIDAZOLE 500 MG: 500 TABLET, FILM COATED ORAL at 06:53

## 2023-07-21 RX ADMIN — AMITRIPTYLINE HYDROCHLORIDE 25 MG: 25 TABLET, FILM COATED ORAL at 09:47

## 2023-07-21 RX ADMIN — QUETIAPINE FUMARATE 50 MG: 25 TABLET ORAL at 21:17

## 2023-07-21 RX ADMIN — Medication 600 MG: at 21:18

## 2023-07-21 RX ADMIN — METOPROLOL SUCCINATE 50 MG: 50 TABLET, EXTENDED RELEASE ORAL at 09:51

## 2023-07-21 RX ADMIN — SODIUM CHLORIDE, PRESERVATIVE FREE 10 ML: 5 INJECTION INTRAVENOUS at 09:58

## 2023-07-21 RX ADMIN — POLYETHYLENE GLYCOL 3350 17 G: 17 POWDER, FOR SOLUTION ORAL at 11:05

## 2023-07-21 RX ADMIN — CLONAZEPAM 0.5 MG: 0.5 TABLET ORAL at 13:40

## 2023-07-21 RX ADMIN — HYPROMELLOSE 2910 2 DROP: 5 SOLUTION OPHTHALMIC at 09:55

## 2023-07-21 RX ADMIN — AMITRIPTYLINE HYDROCHLORIDE 25 MG: 25 TABLET, FILM COATED ORAL at 15:44

## 2023-07-21 RX ADMIN — BUSPIRONE HYDROCHLORIDE 10 MG: 10 TABLET ORAL at 09:48

## 2023-07-21 RX ADMIN — ESCITALOPRAM OXALATE 20 MG: 10 TABLET ORAL at 09:51

## 2023-07-21 RX ADMIN — DOCUSATE SODIUM 50 MG AND SENNOSIDES 8.6 MG 2 TABLET: 8.6; 5 TABLET, FILM COATED ORAL at 09:51

## 2023-07-21 RX ADMIN — AMITRIPTYLINE HYDROCHLORIDE 25 MG: 25 TABLET, FILM COATED ORAL at 22:42

## 2023-07-21 RX ADMIN — HYPROMELLOSE 2910 2 DROP: 5 SOLUTION OPHTHALMIC at 15:41

## 2023-07-21 RX ADMIN — PANTOPRAZOLE SODIUM 40 MG: 40 TABLET, DELAYED RELEASE ORAL at 15:41

## 2023-07-21 RX ADMIN — ONDANSETRON 4 MG: 2 INJECTION INTRAMUSCULAR; INTRAVENOUS at 13:40

## 2023-07-21 RX ADMIN — Medication 600 MG: at 09:48

## 2023-07-21 RX ADMIN — LACTULOSE 10 G: 20 SOLUTION ORAL at 09:51

## 2023-07-21 RX ADMIN — CIPROFLOXACIN 500 MG: 500 TABLET, FILM COATED ORAL at 21:18

## 2023-07-21 RX ADMIN — PANTOPRAZOLE SODIUM 40 MG: 40 TABLET, DELAYED RELEASE ORAL at 06:53

## 2023-07-21 RX ADMIN — GABAPENTIN 300 MG: 300 CAPSULE ORAL at 15:41

## 2023-07-21 RX ADMIN — TRAZODONE HYDROCHLORIDE 100 MG: 100 TABLET ORAL at 21:17

## 2023-07-21 RX ADMIN — METRONIDAZOLE 500 MG: 500 TABLET, FILM COATED ORAL at 21:18

## 2023-07-21 RX ADMIN — METRONIDAZOLE 500 MG: 500 TABLET, FILM COATED ORAL at 15:41

## 2023-07-21 RX ADMIN — CLONAZEPAM 0.5 MG: 0.5 TABLET ORAL at 21:17

## 2023-07-21 RX ADMIN — GABAPENTIN 300 MG: 300 CAPSULE ORAL at 21:18

## 2023-07-21 RX ADMIN — FAMOTIDINE 20 MG: 20 TABLET, FILM COATED ORAL at 09:51

## 2023-07-21 RX ADMIN — FLUTICASONE PROPIONATE 1 SPRAY: 50 SPRAY, METERED NASAL at 09:55

## 2023-07-21 RX ADMIN — BUSPIRONE HYDROCHLORIDE 10 MG: 10 TABLET ORAL at 21:17

## 2023-07-21 RX ADMIN — CETIRIZINE HYDROCHLORIDE 5 MG: 10 TABLET ORAL at 09:48

## 2023-07-21 RX ADMIN — OXYCODONE HYDROCHLORIDE AND ACETAMINOPHEN 1 TABLET: 5; 325 TABLET ORAL at 08:44

## 2023-07-21 RX ADMIN — OXYCODONE HYDROCHLORIDE AND ACETAMINOPHEN 1 TABLET: 5; 325 TABLET ORAL at 18:14

## 2023-07-21 ASSESSMENT — PAIN - FUNCTIONAL ASSESSMENT
PAIN_FUNCTIONAL_ASSESSMENT: PREVENTS OR INTERFERES WITH MANY ACTIVE NOT PASSIVE ACTIVITIES
PAIN_FUNCTIONAL_ASSESSMENT: PREVENTS OR INTERFERES WITH MANY ACTIVE NOT PASSIVE ACTIVITIES

## 2023-07-21 ASSESSMENT — PAIN DESCRIPTION - DESCRIPTORS
DESCRIPTORS: ACHING;SORE
DESCRIPTORS: ACHING;SORE

## 2023-07-21 ASSESSMENT — PAIN DESCRIPTION - LOCATION
LOCATION: HIP;LEG
LOCATION: ABDOMEN;LEG

## 2023-07-21 ASSESSMENT — PAIN SCALES - GENERAL
PAINLEVEL_OUTOF10: 0
PAINLEVEL_OUTOF10: 9
PAINLEVEL_OUTOF10: 0
PAINLEVEL_OUTOF10: 0
PAINLEVEL_OUTOF10: 5
PAINLEVEL_OUTOF10: 0
PAINLEVEL_OUTOF10: 7
PAINLEVEL_OUTOF10: 0
PAINLEVEL_OUTOF10: 6

## 2023-07-21 ASSESSMENT — PAIN DESCRIPTION - ORIENTATION
ORIENTATION: RIGHT
ORIENTATION: RIGHT

## 2023-07-21 ASSESSMENT — PAIN SCALES - WONG BAKER
WONGBAKER_NUMERICALRESPONSE: 2
WONGBAKER_NUMERICALRESPONSE: 2

## 2023-07-21 NOTE — PROGRESS NOTES
Physician Progress Note      Lawrence Olvera  CSN #:                  [id number]  :                       1945  ADMIT DATE:       2023 8:42 AM  DISCH DATE:  RESPONDING  PROVIDER #:        Joselo Freeman MD          QUERY TEXT:    Patient admitted with Viral colitis with past history significant for UTI with   urinary obstruction. , noted to have UA on admission showing many bacteria   negative nitrite and leukocyte esterase. urine culture not ordered. H&p notes   negative UA . UTI noted in GI progress notes of  . Ordered on po cipro . If   possible, please clarify one of the following: The medical record reflects the following:  Risk Factors: history of UTI, constipation, age, ckd  Clinical Indicators: admitted with Viral colitis with past history significant   for UTI with urinary obstruction. , noted to have UA on admission showing many   bacteria negative nitrite and leukocyte esterase. urine culture not ordered. H&p notes negative UA . UTI noted in progress notes of  . Ordered on po   cipro  Treatment: UA, lab monitoring, po Cipro    Thank Alisa Zamudio RN BSN  CCDS  Email [email or web address]  Cell [phone]  office hours M-F 6am to 2:30pm  Options provided:  -- UTI despite negative leukocyte on UA  -- UTI ruled out after study  -- Other - I will add my own diagnosis  -- Disagree - Not applicable / Not valid  -- Disagree - Clinically unable to determine / Unknown  -- Refer to Clinical Documentation Reviewer    PROVIDER RESPONSE TEXT:    This patient has UTI despite negative leukocyte on UA. Query created by:  Michael Gastelum on 2023 8:41 AM      Electronically signed by:  Joselo Freeman MD 2023 9:40 AM

## 2023-07-22 ENCOUNTER — APPOINTMENT (OUTPATIENT)
Dept: GENERAL RADIOLOGY | Age: 78
DRG: 372 | End: 2023-07-22
Payer: MEDICARE

## 2023-07-22 PROBLEM — K56.609 SBO (SMALL BOWEL OBSTRUCTION) (HCC): Status: ACTIVE | Noted: 2023-07-22

## 2023-07-22 LAB
ANION GAP SERPL CALCULATED.3IONS-SCNC: 11 MMOL/L (ref 9–17)
BASOPHILS # BLD: 0.03 K/UL (ref 0–0.2)
BASOPHILS NFR BLD: 1 % (ref 0–2)
BUN SERPL-MCNC: 13 MG/DL (ref 8–23)
CALCIUM SERPL-MCNC: 9.5 MG/DL (ref 8.6–10.4)
CHLORIDE SERPL-SCNC: 94 MMOL/L (ref 98–107)
CO2 SERPL-SCNC: 28 MMOL/L (ref 20–31)
CREAT SERPL-MCNC: 1.1 MG/DL (ref 0.5–0.9)
EOSINOPHIL # BLD: 0.13 K/UL (ref 0–0.44)
EOSINOPHILS RELATIVE PERCENT: 2 % (ref 1–4)
ERYTHROCYTE [DISTWIDTH] IN BLOOD BY AUTOMATED COUNT: 14.1 % (ref 11.8–14.4)
GFR SERPL CREATININE-BSD FRML MDRD: 52 ML/MIN/1.73M2
GLUCOSE SERPL-MCNC: 121 MG/DL (ref 70–99)
HCT VFR BLD AUTO: 46.1 % (ref 36.3–47.1)
HGB BLD-MCNC: 14.7 G/DL (ref 11.9–15.1)
IMM GRANULOCYTES # BLD AUTO: 0.08 K/UL (ref 0–0.3)
IMM GRANULOCYTES NFR BLD: 1 %
LYMPHOCYTES NFR BLD: 0.87 K/UL (ref 1.1–3.7)
LYMPHOCYTES RELATIVE PERCENT: 14 % (ref 24–43)
MAGNESIUM SERPL-MCNC: 2.1 MG/DL (ref 1.6–2.6)
MCH RBC QN AUTO: 30.2 PG (ref 25.2–33.5)
MCHC RBC AUTO-ENTMCNC: 31.9 G/DL (ref 28.4–34.8)
MCV RBC AUTO: 94.9 FL (ref 82.6–102.9)
MONOCYTES NFR BLD: 0.74 K/UL (ref 0.1–1.2)
MONOCYTES NFR BLD: 12 % (ref 3–12)
NEUTROPHILS NFR BLD: 70 % (ref 36–65)
NEUTS SEG NFR BLD: 4.33 K/UL (ref 1.5–8.1)
NRBC BLD-RTO: 0 PER 100 WBC
PHOSPHATE SERPL-MCNC: 3.2 MG/DL (ref 2.6–4.5)
PLATELET # BLD AUTO: 163 K/UL (ref 138–453)
PMV BLD AUTO: 10.9 FL (ref 8.1–13.5)
POTASSIUM SERPL-SCNC: 3.8 MMOL/L (ref 3.7–5.3)
RBC # BLD AUTO: 4.86 M/UL (ref 3.95–5.11)
SODIUM SERPL-SCNC: 133 MMOL/L (ref 135–144)
WBC OTHER # BLD: 6.2 K/UL (ref 3.5–11.3)

## 2023-07-22 PROCEDURE — 1200000000 HC SEMI PRIVATE

## 2023-07-22 PROCEDURE — 84100 ASSAY OF PHOSPHORUS: CPT

## 2023-07-22 PROCEDURE — 99222 1ST HOSP IP/OBS MODERATE 55: CPT | Performed by: SURGERY

## 2023-07-22 PROCEDURE — 6370000000 HC RX 637 (ALT 250 FOR IP)

## 2023-07-22 PROCEDURE — 6360000002 HC RX W HCPCS

## 2023-07-22 PROCEDURE — 83735 ASSAY OF MAGNESIUM: CPT

## 2023-07-22 PROCEDURE — 6370000000 HC RX 637 (ALT 250 FOR IP): Performed by: EMERGENCY MEDICINE

## 2023-07-22 PROCEDURE — 99232 SBSQ HOSP IP/OBS MODERATE 35: CPT | Performed by: INTERNAL MEDICINE

## 2023-07-22 PROCEDURE — 36415 COLL VENOUS BLD VENIPUNCTURE: CPT

## 2023-07-22 PROCEDURE — 80048 BASIC METABOLIC PNL TOTAL CA: CPT

## 2023-07-22 PROCEDURE — 74018 RADEX ABDOMEN 1 VIEW: CPT

## 2023-07-22 PROCEDURE — 2580000003 HC RX 258

## 2023-07-22 PROCEDURE — 85027 COMPLETE CBC AUTOMATED: CPT

## 2023-07-22 RX ORDER — OXYCODONE HYDROCHLORIDE AND ACETAMINOPHEN 5; 325 MG/1; MG/1
1 TABLET ORAL EVERY 6 HOURS PRN
Status: DISCONTINUED | OUTPATIENT
Start: 2023-07-22 | End: 2023-07-24

## 2023-07-22 RX ORDER — LORAZEPAM 2 MG/ML
1 INJECTION INTRAMUSCULAR
Status: COMPLETED | OUTPATIENT
Start: 2023-07-22 | End: 2023-07-22

## 2023-07-22 RX ORDER — BISACODYL 10 MG
10 SUPPOSITORY, RECTAL RECTAL DAILY
Status: DISCONTINUED | OUTPATIENT
Start: 2023-07-22 | End: 2023-07-26 | Stop reason: HOSPADM

## 2023-07-22 RX ORDER — CALCIUM CARBONATE 500 MG/1
500 TABLET, CHEWABLE ORAL ONCE
Status: COMPLETED | OUTPATIENT
Start: 2023-07-22 | End: 2023-07-22

## 2023-07-22 RX ORDER — POTASSIUM CHLORIDE 20 MEQ/1
20 TABLET, EXTENDED RELEASE ORAL ONCE
Status: COMPLETED | OUTPATIENT
Start: 2023-07-22 | End: 2023-07-22

## 2023-07-22 RX ADMIN — METRONIDAZOLE 500 MG: 500 TABLET, FILM COATED ORAL at 16:00

## 2023-07-22 RX ADMIN — SODIUM CHLORIDE, PRESERVATIVE FREE 10 ML: 5 INJECTION INTRAVENOUS at 09:09

## 2023-07-22 RX ADMIN — PANTOPRAZOLE SODIUM 40 MG: 40 TABLET, DELAYED RELEASE ORAL at 16:00

## 2023-07-22 RX ADMIN — ENOXAPARIN SODIUM 40 MG: 100 INJECTION SUBCUTANEOUS at 09:04

## 2023-07-22 RX ADMIN — BUSPIRONE HYDROCHLORIDE 10 MG: 10 TABLET ORAL at 08:59

## 2023-07-22 RX ADMIN — ONDANSETRON 4 MG: 2 INJECTION INTRAMUSCULAR; INTRAVENOUS at 08:47

## 2023-07-22 RX ADMIN — POTASSIUM CHLORIDE 20 MEQ: 1500 TABLET, EXTENDED RELEASE ORAL at 21:40

## 2023-07-22 RX ADMIN — OXYCODONE HYDROCHLORIDE AND ACETAMINOPHEN 1 TABLET: 5; 325 TABLET ORAL at 21:40

## 2023-07-22 RX ADMIN — TRAZODONE HYDROCHLORIDE 100 MG: 100 TABLET ORAL at 21:40

## 2023-07-22 RX ADMIN — Medication 600 MG: at 08:59

## 2023-07-22 RX ADMIN — GABAPENTIN 300 MG: 300 CAPSULE ORAL at 08:59

## 2023-07-22 RX ADMIN — ANTACID TABLETS 500 MG: 500 TABLET, CHEWABLE ORAL at 12:46

## 2023-07-22 RX ADMIN — OXYCODONE HYDROCHLORIDE AND ACETAMINOPHEN 1 TABLET: 5; 325 TABLET ORAL at 12:46

## 2023-07-22 RX ADMIN — HYPROMELLOSE 2910 2 DROP: 5 SOLUTION OPHTHALMIC at 16:01

## 2023-07-22 RX ADMIN — GABAPENTIN 300 MG: 300 CAPSULE ORAL at 16:00

## 2023-07-22 RX ADMIN — AMITRIPTYLINE HYDROCHLORIDE 25 MG: 25 TABLET, FILM COATED ORAL at 21:41

## 2023-07-22 RX ADMIN — FLUTICASONE PROPIONATE 1 SPRAY: 50 SPRAY, METERED NASAL at 09:04

## 2023-07-22 RX ADMIN — LORAZEPAM 1 MG: 2 INJECTION INTRAMUSCULAR; INTRAVENOUS at 13:49

## 2023-07-22 RX ADMIN — GABAPENTIN 300 MG: 300 CAPSULE ORAL at 21:40

## 2023-07-22 RX ADMIN — CETIRIZINE HYDROCHLORIDE 5 MG: 10 TABLET ORAL at 08:59

## 2023-07-22 RX ADMIN — MAGNESIUM CITRATE 296 ML: 1.75 LIQUID ORAL at 08:58

## 2023-07-22 RX ADMIN — OXYCODONE HYDROCHLORIDE AND ACETAMINOPHEN 2 TABLET: 5; 325 TABLET ORAL at 02:21

## 2023-07-22 RX ADMIN — CLONAZEPAM 0.5 MG: 0.5 TABLET ORAL at 09:03

## 2023-07-22 RX ADMIN — AMITRIPTYLINE HYDROCHLORIDE 25 MG: 25 TABLET, FILM COATED ORAL at 08:59

## 2023-07-22 RX ADMIN — METRONIDAZOLE 500 MG: 500 TABLET, FILM COATED ORAL at 21:40

## 2023-07-22 RX ADMIN — BUSPIRONE HYDROCHLORIDE 10 MG: 10 TABLET ORAL at 21:41

## 2023-07-22 RX ADMIN — PANTOPRAZOLE SODIUM 40 MG: 40 TABLET, DELAYED RELEASE ORAL at 06:16

## 2023-07-22 RX ADMIN — HYPROMELLOSE 2910 2 DROP: 5 SOLUTION OPHTHALMIC at 09:04

## 2023-07-22 RX ADMIN — AMITRIPTYLINE HYDROCHLORIDE 25 MG: 25 TABLET, FILM COATED ORAL at 16:00

## 2023-07-22 RX ADMIN — HYPROMELLOSE 2910 2 DROP: 5 SOLUTION OPHTHALMIC at 21:44

## 2023-07-22 RX ADMIN — QUETIAPINE FUMARATE 50 MG: 25 TABLET ORAL at 21:40

## 2023-07-22 RX ADMIN — SODIUM CHLORIDE, PRESERVATIVE FREE 10 ML: 5 INJECTION INTRAVENOUS at 21:41

## 2023-07-22 RX ADMIN — CLONAZEPAM 0.5 MG: 0.5 TABLET ORAL at 16:00

## 2023-07-22 RX ADMIN — Medication 600 MG: at 21:41

## 2023-07-22 RX ADMIN — DOCUSATE SODIUM 50 MG AND SENNOSIDES 8.6 MG 2 TABLET: 8.6; 5 TABLET, FILM COATED ORAL at 08:59

## 2023-07-22 RX ADMIN — METOPROLOL SUCCINATE 50 MG: 50 TABLET, EXTENDED RELEASE ORAL at 08:59

## 2023-07-22 RX ADMIN — METRONIDAZOLE 500 MG: 500 TABLET, FILM COATED ORAL at 06:16

## 2023-07-22 RX ADMIN — DESMOPRESSIN ACETATE 40 MG: 0.2 TABLET ORAL at 08:59

## 2023-07-22 RX ADMIN — ESCITALOPRAM OXALATE 20 MG: 10 TABLET ORAL at 08:59

## 2023-07-22 ASSESSMENT — PAIN SCALES - GENERAL
PAINLEVEL_OUTOF10: 0
PAINLEVEL_OUTOF10: 8
PAINLEVEL_OUTOF10: 0
PAINLEVEL_OUTOF10: 8
PAINLEVEL_OUTOF10: 0
PAINLEVEL_OUTOF10: 9

## 2023-07-22 ASSESSMENT — PAIN DESCRIPTION - LOCATION
LOCATION: LEG
LOCATION: ABDOMEN;LEG

## 2023-07-22 ASSESSMENT — PAIN DESCRIPTION - DESCRIPTORS
DESCRIPTORS: DISCOMFORT
DESCRIPTORS: SHARP

## 2023-07-22 ASSESSMENT — PAIN DESCRIPTION - ORIENTATION
ORIENTATION: RIGHT
ORIENTATION: RIGHT

## 2023-07-22 ASSESSMENT — PAIN SCALES - WONG BAKER: WONGBAKER_NUMERICALRESPONSE: 0

## 2023-07-22 ASSESSMENT — PAIN - FUNCTIONAL ASSESSMENT: PAIN_FUNCTIONAL_ASSESSMENT: PREVENTS OR INTERFERES SOME ACTIVE ACTIVITIES AND ADLS

## 2023-07-22 NOTE — PLAN OF CARE
Problem: Pain  Goal: Verbalizes/displays adequate comfort level or baseline comfort level  Outcome: Progressing     Problem: Safety - Adult  Goal: Free from fall injury  Outcome: Progressing     Problem: Discharge Planning  Goal: Discharge to home or other facility with appropriate resources  Outcome: Not Progressing  Flowsheets (Taken 7/21/2023 0800 by Neema Vilchis, RN)  Discharge to home or other facility with appropriate resources: Arrange for needed discharge resources and transportation as appropriate     Problem: Discharge Planning  Goal: Discharge to home or other facility with appropriate resources  Outcome: Not Progressing  Flowsheets (Taken 7/21/2023 0800 by Neema Vilchis, RN)  Discharge to home or other facility with appropriate resources: Arrange for needed discharge resources and transportation as appropriate

## 2023-07-22 NOTE — CONSULTS
General Surgery  Consult    PATIENT NAME: Ernie Ramirez  AGE: 68 y.o. MEDICAL RECORD NO. [id number]  DATE: 7/22/2023  SURGEON: Dr. Laurent Piedra: No primary care provider on file. Patient evaluated at the request of  Dr. Miri Carbone  Reason for evaluation: pSBO    Patient information was obtained from patient and past medical records. History/Exam limitations: none.     IMPRESSION:     Patient Active Problem List   Diagnosis    Frequency of urination    Backache    GERD (gastroesophageal reflux disease)    MVP (mitral valve prolapse)    Depression    Anxiety    Urinary retention    BENEDICT (acute kidney injury) ([address])    Dysphagia    Hiatal hernia    History of repair of hiatal hernia    Centrilobular emphysema (HCC)    Esophageal dilatation    Shock (HCC)    Fever    Critical illness polyneuropathy (HCC)    Gastric outlet obstruction    Recurrent UTI    Tracheostomy in place [facility])    H/O gastric ulcer    Hyperlipidemia    Essential hypertension    Tobacco abuse    Chronic respiratory failure with hypoxia (HCC)    S/P percutaneous endoscopic gastrostomy (PEG) tube placement (HCC)    S/P Nissen fundoplication (without gastrostomy tube) procedure    Anemia due to blood loss    Phlebitis after infusion    Esophagitis determined by endoscopy    Expressive aphasia    Transient speech disturbance    Speech disturbance    Coffee ground emesis    Acute cystitis without hematuria    Ulcerative esophagitis    Septicemia (HCC)    Lower abdominal pain    Ileus (HCC)    Chronic pain    Dyspnea on exertion    Orthostatic hypotension    Stage 3 chronic kidney disease (HCC)    Difficulty walking    Class 1 obesity in adult    Generalized abdominal pain    Multifocal pneumonia    Other constipation    Intractable abdominal pain    ESBL (extended spectrum beta-lactamase) producing bacteria infection    Complicated UTI (urinary tract infection)    COVID-19    Acute on chronic respiratory failure with hypoxia ([address])    COVID pneumoniae) as the cause of diseases classified elsewhere, Major depression, recurrent ([address]), Mitral valve prolapse, Mixed hyperlipidemia, Mumps, Muscle weakness (generalized), MVP (mitral valve prolapse), Old myocardial infarction, Personal history of disease of digestive system, Psychophysiological insomnia, Recurrent UTI, S/P PICC central line placement, SBO (small bowel obstruction) ([address]), Sepsis ([address]), Sinusitis, Tracheostomy in place [facility]), Ulcerative esophagitis, Urinary tract infection, site not specified, Uses wheelchair, Wears glasses, and Wellness examination. Past Surgical History   has a past surgical history that includes Hysterectomy; total nephrectomy; Tonsillectomy; Appendectomy; Cystoscopy; Ovary removal; Tubal ligation; rhinoplasty; Carpal tunnel release; Hand surgery; Total hip arthroplasty; eye surgery; Colonoscopy; joint replacement (Right); Gastric fundoplication (N/A, 30/41/0632); hc cath power picc triple (03/04/2020); Upper gastrointestinal endoscopy (N/A, 03/17/2020); tracheostomy (N/A, 03/27/2020); Gastrostomy tube placement (N/A, 03/27/2020); tracheostomy (N/A, 04/02/2020); Upper gastrointestinal endoscopy (N/A, 05/18/2020); Upper gastrointestinal endoscopy (05/18/2020); ERCP (05/21/2020); ERCP (05/21/2020); ERCP (05/21/2020); ERCP (05/21/2020); Cholecystectomy, laparoscopic (N/A, 05/22/2020); Upper gastrointestinal endoscopy (N/A, 07/06/2020); Upper gastrointestinal endoscopy (N/A, 11/09/2020); Upper gastrointestinal endoscopy (02/08/2021); Gastrostomy tube placement (N/A, 2/8/2021); ERCP (N/A, 2/8/2021); ERCP (2/8/2021); Upper gastrointestinal endoscopy (N/A, 11/24/2021); hernia repair; Colonoscopy (N/A, 1/28/2022); IR NONTUNNELED VASCULAR CATHETER > 5 YEARS (5/10/2022); Upper gastrointestinal endoscopy (N/A, 9/8/2022); and Upper gastrointestinal endoscopy (N/A, 10/11/2022). Medications  Prior to Admission medications    Medication Sig Start Date End Date Taking?  Authorizing Provider

## 2023-07-23 ENCOUNTER — APPOINTMENT (OUTPATIENT)
Dept: GENERAL RADIOLOGY | Age: 78
DRG: 372 | End: 2023-07-23
Payer: MEDICARE

## 2023-07-23 LAB
ANION GAP SERPL CALCULATED.3IONS-SCNC: 12 MMOL/L (ref 9–17)
BASOPHILS # BLD: 0.06 K/UL (ref 0–0.2)
BASOPHILS NFR BLD: 1 % (ref 0–2)
BUN SERPL-MCNC: 15 MG/DL (ref 8–23)
CALCIUM SERPL-MCNC: 9.7 MG/DL (ref 8.6–10.4)
CHLORIDE SERPL-SCNC: 101 MMOL/L (ref 98–107)
CO2 SERPL-SCNC: 22 MMOL/L (ref 20–31)
CREAT SERPL-MCNC: 1.1 MG/DL (ref 0.5–0.9)
EOSINOPHIL # BLD: 0.17 K/UL (ref 0–0.44)
EOSINOPHILS RELATIVE PERCENT: 3 % (ref 1–4)
ERYTHROCYTE [DISTWIDTH] IN BLOOD BY AUTOMATED COUNT: 14.3 % (ref 11.8–14.4)
GFR SERPL CREATININE-BSD FRML MDRD: 52 ML/MIN/1.73M2
GLUCOSE SERPL-MCNC: 129 MG/DL (ref 70–99)
HCT VFR BLD AUTO: 45.7 % (ref 36.3–47.1)
HGB BLD-MCNC: 14.3 G/DL (ref 11.9–15.1)
IMM GRANULOCYTES # BLD AUTO: 0.08 K/UL (ref 0–0.3)
IMM GRANULOCYTES NFR BLD: 2 %
LYMPHOCYTES NFR BLD: 0.78 K/UL (ref 1.1–3.7)
LYMPHOCYTES RELATIVE PERCENT: 15 % (ref 24–43)
MAGNESIUM SERPL-MCNC: 1.9 MG/DL (ref 1.6–2.6)
MCH RBC QN AUTO: 30 PG (ref 25.2–33.5)
MCHC RBC AUTO-ENTMCNC: 31.3 G/DL (ref 28.4–34.8)
MCV RBC AUTO: 96 FL (ref 82.6–102.9)
MONOCYTES NFR BLD: 0.93 K/UL (ref 0.1–1.2)
MONOCYTES NFR BLD: 17 % (ref 3–12)
NEUTROPHILS NFR BLD: 62 % (ref 36–65)
NEUTS SEG NFR BLD: 3.35 K/UL (ref 1.5–8.1)
NRBC BLD-RTO: 0 PER 100 WBC
PHOSPHATE SERPL-MCNC: 2.6 MG/DL (ref 2.6–4.5)
PLATELET # BLD AUTO: ABNORMAL K/UL (ref 138–453)
PLATELET, FLUORESCENCE: 170 K/UL (ref 138–453)
PLATELETS.RETICULATED NFR BLD AUTO: 4.2 % (ref 1.1–10.3)
POTASSIUM SERPL-SCNC: 4 MMOL/L (ref 3.7–5.3)
RBC # BLD AUTO: 4.76 M/UL (ref 3.95–5.11)
SODIUM SERPL-SCNC: 135 MMOL/L (ref 135–144)
WBC OTHER # BLD: 5.4 K/UL (ref 3.5–11.3)

## 2023-07-23 PROCEDURE — 6370000000 HC RX 637 (ALT 250 FOR IP)

## 2023-07-23 PROCEDURE — 2580000003 HC RX 258

## 2023-07-23 PROCEDURE — 99232 SBSQ HOSP IP/OBS MODERATE 35: CPT | Performed by: SURGERY

## 2023-07-23 PROCEDURE — 83735 ASSAY OF MAGNESIUM: CPT

## 2023-07-23 PROCEDURE — 1200000000 HC SEMI PRIVATE

## 2023-07-23 PROCEDURE — 51798 US URINE CAPACITY MEASURE: CPT

## 2023-07-23 PROCEDURE — 74018 RADEX ABDOMEN 1 VIEW: CPT

## 2023-07-23 PROCEDURE — 80048 BASIC METABOLIC PNL TOTAL CA: CPT

## 2023-07-23 PROCEDURE — 85055 RETICULATED PLATELET ASSAY: CPT

## 2023-07-23 PROCEDURE — 85027 COMPLETE CBC AUTOMATED: CPT

## 2023-07-23 PROCEDURE — 6370000000 HC RX 637 (ALT 250 FOR IP): Performed by: EMERGENCY MEDICINE

## 2023-07-23 PROCEDURE — 6360000002 HC RX W HCPCS

## 2023-07-23 PROCEDURE — 51701 INSERT BLADDER CATHETER: CPT

## 2023-07-23 PROCEDURE — 36415 COLL VENOUS BLD VENIPUNCTURE: CPT

## 2023-07-23 PROCEDURE — 99233 SBSQ HOSP IP/OBS HIGH 50: CPT | Performed by: INTERNAL MEDICINE

## 2023-07-23 PROCEDURE — 6370000000 HC RX 637 (ALT 250 FOR IP): Performed by: NURSE PRACTITIONER

## 2023-07-23 PROCEDURE — 84100 ASSAY OF PHOSPHORUS: CPT

## 2023-07-23 RX ADMIN — FLUTICASONE PROPIONATE 1 SPRAY: 50 SPRAY, METERED NASAL at 09:14

## 2023-07-23 RX ADMIN — ESCITALOPRAM OXALATE 20 MG: 10 TABLET ORAL at 09:13

## 2023-07-23 RX ADMIN — ENOXAPARIN SODIUM 40 MG: 100 INJECTION SUBCUTANEOUS at 09:12

## 2023-07-23 RX ADMIN — Medication 600 MG: at 09:14

## 2023-07-23 RX ADMIN — HYPROMELLOSE 2910 2 DROP: 5 SOLUTION OPHTHALMIC at 21:33

## 2023-07-23 RX ADMIN — BENZONATATE 100 MG: 100 CAPSULE ORAL at 17:04

## 2023-07-23 RX ADMIN — TRAZODONE HYDROCHLORIDE 100 MG: 100 TABLET ORAL at 21:28

## 2023-07-23 RX ADMIN — Medication 600 MG: at 21:29

## 2023-07-23 RX ADMIN — SODIUM CHLORIDE, PRESERVATIVE FREE 10 ML: 5 INJECTION INTRAVENOUS at 09:08

## 2023-07-23 RX ADMIN — SODIUM CHLORIDE, PRESERVATIVE FREE 10 ML: 5 INJECTION INTRAVENOUS at 21:29

## 2023-07-23 RX ADMIN — METRONIDAZOLE 500 MG: 500 TABLET, FILM COATED ORAL at 05:46

## 2023-07-23 RX ADMIN — METRONIDAZOLE 500 MG: 500 TABLET, FILM COATED ORAL at 13:59

## 2023-07-23 RX ADMIN — GABAPENTIN 300 MG: 300 CAPSULE ORAL at 14:56

## 2023-07-23 RX ADMIN — DESMOPRESSIN ACETATE 40 MG: 0.2 TABLET ORAL at 09:13

## 2023-07-23 RX ADMIN — PANTOPRAZOLE SODIUM 40 MG: 40 TABLET, DELAYED RELEASE ORAL at 05:46

## 2023-07-23 RX ADMIN — OXYCODONE HYDROCHLORIDE AND ACETAMINOPHEN 1 TABLET: 5; 325 TABLET ORAL at 15:10

## 2023-07-23 RX ADMIN — BUSPIRONE HYDROCHLORIDE 10 MG: 10 TABLET ORAL at 09:13

## 2023-07-23 RX ADMIN — CETIRIZINE HYDROCHLORIDE 5 MG: 10 TABLET ORAL at 09:13

## 2023-07-23 RX ADMIN — HYPROMELLOSE 2910 2 DROP: 5 SOLUTION OPHTHALMIC at 09:14

## 2023-07-23 RX ADMIN — QUETIAPINE FUMARATE 50 MG: 25 TABLET ORAL at 21:28

## 2023-07-23 RX ADMIN — OXYCODONE HYDROCHLORIDE AND ACETAMINOPHEN 1 TABLET: 5; 325 TABLET ORAL at 09:14

## 2023-07-23 RX ADMIN — AMITRIPTYLINE HYDROCHLORIDE 25 MG: 25 TABLET, FILM COATED ORAL at 09:14

## 2023-07-23 RX ADMIN — METOPROLOL SUCCINATE 50 MG: 50 TABLET, EXTENDED RELEASE ORAL at 09:12

## 2023-07-23 RX ADMIN — GABAPENTIN 300 MG: 300 CAPSULE ORAL at 21:29

## 2023-07-23 RX ADMIN — CLONAZEPAM 0.5 MG: 0.5 TABLET ORAL at 13:58

## 2023-07-23 RX ADMIN — AMITRIPTYLINE HYDROCHLORIDE 25 MG: 25 TABLET, FILM COATED ORAL at 21:29

## 2023-07-23 RX ADMIN — GABAPENTIN 300 MG: 300 CAPSULE ORAL at 09:12

## 2023-07-23 RX ADMIN — METRONIDAZOLE 500 MG: 500 TABLET, FILM COATED ORAL at 21:29

## 2023-07-23 RX ADMIN — CLONAZEPAM 0.5 MG: 0.5 TABLET ORAL at 05:46

## 2023-07-23 RX ADMIN — HYPROMELLOSE 2910 2 DROP: 5 SOLUTION OPHTHALMIC at 14:56

## 2023-07-23 RX ADMIN — PANTOPRAZOLE SODIUM 40 MG: 40 TABLET, DELAYED RELEASE ORAL at 15:53

## 2023-07-23 RX ADMIN — OXYCODONE HYDROCHLORIDE AND ACETAMINOPHEN 1 TABLET: 5; 325 TABLET ORAL at 21:28

## 2023-07-23 RX ADMIN — BUSPIRONE HYDROCHLORIDE 10 MG: 10 TABLET ORAL at 21:29

## 2023-07-23 RX ADMIN — ONDANSETRON 4 MG: 2 INJECTION INTRAMUSCULAR; INTRAVENOUS at 09:08

## 2023-07-23 RX ADMIN — AMITRIPTYLINE HYDROCHLORIDE 25 MG: 25 TABLET, FILM COATED ORAL at 14:56

## 2023-07-23 ASSESSMENT — PAIN SCALES - GENERAL
PAINLEVEL_OUTOF10: 7
PAINLEVEL_OUTOF10: 7
PAINLEVEL_OUTOF10: 8

## 2023-07-23 ASSESSMENT — PAIN DESCRIPTION - DESCRIPTORS
DESCRIPTORS: CRAMPING
DESCRIPTORS: CRAMPING

## 2023-07-23 ASSESSMENT — PAIN DESCRIPTION - LOCATION
LOCATION: ABDOMEN
LOCATION: ABDOMEN

## 2023-07-23 NOTE — PROGRESS NOTES
PROGRESS NOTE      PATIENT NAME: Rod [facility] RECORD NO. [id number]  DATE: 7/23/2023    HD: # 4      Patient Active Problem List   Diagnosis    Frequency of urination    Backache    GERD (gastroesophageal reflux disease)    MVP (mitral valve prolapse)    Depression    Anxiety    Urinary retention    BENEDICT (acute kidney injury) ([address])    Dysphagia    Hiatal hernia    History of repair of hiatal hernia    Centrilobular emphysema (HCC)    Esophageal dilatation    Shock ([facility])    Fever    Critical illness polyneuropathy ([facility])    Gastric outlet obstruction    Recurrent UTI    Tracheostomy in place [facility])    H/O gastric ulcer    Hyperlipidemia    Essential hypertension    Tobacco abuse    Chronic respiratory failure with hypoxia ([facility])    S/P percutaneous endoscopic gastrostomy (PEG) tube placement ([facility])    S/P Nissen fundoplication (without gastrostomy tube) procedure    Anemia due to blood loss    Phlebitis after infusion    Esophagitis determined by endoscopy    Expressive aphasia    Transient speech disturbance    Speech disturbance    Coffee ground emesis    Acute cystitis without hematuria    Ulcerative esophagitis    Septicemia ([facility])    Lower abdominal pain    Ileus ([facility])    Chronic pain    Dyspnea on exertion    Orthostatic hypotension    Stage 3 chronic kidney disease ([facility])    Difficulty walking    Class 1 obesity in adult    Generalized abdominal pain    Multifocal pneumonia    Other constipation    Intractable abdominal pain    ESBL (extended spectrum beta-lactamase) producing bacteria infection    Complicated UTI (urinary tract infection)    COVID-19    Acute on chronic respiratory failure with hypoxia (HCC)    COVID    Hypoxia    Elevated C-reactive protein (CRP)    COPD (chronic obstructive pulmonary disease) (HCC)    Acute respiratory failure with hypoxia ([facility])    Colitis    History of ESBL E. coli infection    Acute metabolic encephalopathy    Congenital solitary kidney    Polypharmacy    Altered

## 2023-07-23 NOTE — PROGRESS NOTES
Hypertension who presented with nausea and abdominal pain. CT abdomen and pelvis showed subacute colitis. Patient admitted to inpatient status for further management. Principal Problem:    SBO (small bowel obstruction) (HCC)  Active Problems:    Intractable abdominal pain    COPD (chronic obstructive pulmonary disease) (HCC)    Colitis    Urinary retention    S/P Nissen fundoplication (without gastrostomy tube) procedure    Paraesophageal hernia    History of depression    Nausea and vomiting  Resolved Problems:    Abdominal pain    Subacute colitis:  - CT abdomen and pelvis showed features suggestive of non specific colitis. CRP 12. Procalcitonin 0.08.  - continue  metronidazole 500 mg daily.  - Gastroenterology consulted. Advised outpatient follow up for EGD and colonoscopy for follow up on gastric outlet obstruction.    - General surgery was consulted initially but they signed off and no further recommendations. Partial SBO versus ileus:  Patient has been on opiates for a long time, Percocet. On Dulcolax 10 mg, Senocort 2 tablets daily, soapsuds enemas. General surgery consulted, no surgical intervention. Urinary retention:   - post void residual volume (7/23): 525  - urology consulted. Advised straight cath if PVR> 400 ml  - discontinue sanctura. Hiatal hernia:  - continue pantoprazole 40 mg daily. Essential hypertension:  -home medications include metoprolol 25 mg   -start metoprolol 50 mg daily. Diet: adult regular diet  DVT ppx : Lovenox  GI ppx: pantoprazole     PT/OT: consulted  Discharge Planning / SW: ongoing      Khari Phillip MD  Internal Medicine Resident, PGY-1  [address], [place]  8/49/2762,1:96 AM   Attending Physician Statement  I have discussed the care of Kiko Pascual and I have examined the patient myself and taken ROS and HPI, including pertinent history and exam findings, with the resident.  I have reviewed the key elements of

## 2023-07-23 NOTE — PLAN OF CARE
Problem: Pain  Goal: Verbalizes/displays adequate comfort level or baseline comfort level  7/23/2023 0459 by Isai Barroso RN  Outcome: Progressing  7/22/2023 1808 by Piotr García RN  Outcome: Progressing     Problem: Safety - Adult  Goal: Free from fall injury  7/23/2023 0459 by Isai Barroso RN  Outcome: Progressing  7/22/2023 1808 by Piotr García RN  Outcome: Progressing     Problem: Discharge Planning  Goal: Discharge to home or other facility with appropriate resources  7/23/2023 0459 by Isai Barroso RN  Outcome: Progressing  7/22/2023 1808 by Piotr García RN  Outcome: Progressing

## 2023-07-24 ENCOUNTER — APPOINTMENT (OUTPATIENT)
Dept: GENERAL RADIOLOGY | Age: 78
DRG: 372 | End: 2023-07-24
Payer: MEDICARE

## 2023-07-24 LAB
ANION GAP SERPL CALCULATED.3IONS-SCNC: 16 MMOL/L (ref 9–17)
BASOPHILS # BLD: <0.03 K/UL (ref 0–0.2)
BASOPHILS NFR BLD: 0 % (ref 0–2)
BUN SERPL-MCNC: 17 MG/DL (ref 8–23)
CALCIUM SERPL-MCNC: 9.5 MG/DL (ref 8.6–10.4)
CHLORIDE SERPL-SCNC: 101 MMOL/L (ref 98–107)
CO2 SERPL-SCNC: 21 MMOL/L (ref 20–31)
CREAT SERPL-MCNC: 1.2 MG/DL (ref 0.5–0.9)
EOSINOPHIL # BLD: 0.04 K/UL (ref 0–0.44)
EOSINOPHILS RELATIVE PERCENT: 1 % (ref 1–4)
ERYTHROCYTE [DISTWIDTH] IN BLOOD BY AUTOMATED COUNT: 14.6 % (ref 11.8–14.4)
GFR SERPL CREATININE-BSD FRML MDRD: 47 ML/MIN/1.73M2
GLUCOSE SERPL-MCNC: 125 MG/DL (ref 70–99)
HCT VFR BLD AUTO: 43.3 % (ref 36.3–47.1)
HGB BLD-MCNC: 13.7 G/DL (ref 11.9–15.1)
IMM GRANULOCYTES # BLD AUTO: 0.08 K/UL (ref 0–0.3)
IMM GRANULOCYTES NFR BLD: 2 %
LYMPHOCYTES NFR BLD: 0.72 K/UL (ref 1.1–3.7)
LYMPHOCYTES RELATIVE PERCENT: 16 % (ref 24–43)
MAGNESIUM SERPL-MCNC: 2.1 MG/DL (ref 1.6–2.6)
MCH RBC QN AUTO: 30 PG (ref 25.2–33.5)
MCHC RBC AUTO-ENTMCNC: 31.6 G/DL (ref 28.4–34.8)
MCV RBC AUTO: 95 FL (ref 82.6–102.9)
MONOCYTES NFR BLD: 0.64 K/UL (ref 0.1–1.2)
MONOCYTES NFR BLD: 14 % (ref 3–12)
NEUTROPHILS NFR BLD: 67 % (ref 36–65)
NEUTS SEG NFR BLD: 3.14 K/UL (ref 1.5–8.1)
NRBC BLD-RTO: 0 PER 100 WBC
PHOSPHATE SERPL-MCNC: 3 MG/DL (ref 2.6–4.5)
PLATELET # BLD AUTO: 146 K/UL (ref 138–453)
PMV BLD AUTO: 11 FL (ref 8.1–13.5)
POTASSIUM SERPL-SCNC: 4.1 MMOL/L (ref 3.7–5.3)
RBC # BLD AUTO: 4.56 M/UL (ref 3.95–5.11)
RBC # BLD: ABNORMAL 10*6/UL
SODIUM SERPL-SCNC: 138 MMOL/L (ref 135–144)
WBC OTHER # BLD: 4.6 K/UL (ref 3.5–11.3)

## 2023-07-24 PROCEDURE — 36415 COLL VENOUS BLD VENIPUNCTURE: CPT

## 2023-07-24 PROCEDURE — 85027 COMPLETE CBC AUTOMATED: CPT

## 2023-07-24 PROCEDURE — 6370000000 HC RX 637 (ALT 250 FOR IP): Performed by: STUDENT IN AN ORGANIZED HEALTH CARE EDUCATION/TRAINING PROGRAM

## 2023-07-24 PROCEDURE — 6370000000 HC RX 637 (ALT 250 FOR IP)

## 2023-07-24 PROCEDURE — 1200000000 HC SEMI PRIVATE

## 2023-07-24 PROCEDURE — 6370000000 HC RX 637 (ALT 250 FOR IP): Performed by: EMERGENCY MEDICINE

## 2023-07-24 PROCEDURE — 2500000003 HC RX 250 WO HCPCS: Performed by: STUDENT IN AN ORGANIZED HEALTH CARE EDUCATION/TRAINING PROGRAM

## 2023-07-24 PROCEDURE — 80048 BASIC METABOLIC PNL TOTAL CA: CPT

## 2023-07-24 PROCEDURE — 6360000002 HC RX W HCPCS

## 2023-07-24 PROCEDURE — 84100 ASSAY OF PHOSPHORUS: CPT

## 2023-07-24 PROCEDURE — 83735 ASSAY OF MAGNESIUM: CPT

## 2023-07-24 PROCEDURE — 99233 SBSQ HOSP IP/OBS HIGH 50: CPT | Performed by: INTERNAL MEDICINE

## 2023-07-24 PROCEDURE — 2580000003 HC RX 258: Performed by: STUDENT IN AN ORGANIZED HEALTH CARE EDUCATION/TRAINING PROGRAM

## 2023-07-24 PROCEDURE — 2580000003 HC RX 258

## 2023-07-24 PROCEDURE — 74018 RADEX ABDOMEN 1 VIEW: CPT

## 2023-07-24 PROCEDURE — 6360000002 HC RX W HCPCS: Performed by: STUDENT IN AN ORGANIZED HEALTH CARE EDUCATION/TRAINING PROGRAM

## 2023-07-24 PROCEDURE — 51798 US URINE CAPACITY MEASURE: CPT

## 2023-07-24 RX ORDER — QUETIAPINE FUMARATE 100 MG/1
100 TABLET, FILM COATED ORAL NIGHTLY
COMMUNITY

## 2023-07-24 RX ORDER — OXYCODONE HYDROCHLORIDE AND ACETAMINOPHEN 5; 325 MG/1; MG/1
1 TABLET ORAL EVERY 8 HOURS PRN
Status: DISCONTINUED | OUTPATIENT
Start: 2023-07-24 | End: 2023-07-26 | Stop reason: HOSPADM

## 2023-07-24 RX ORDER — MAGNESIUM SULFATE IN WATER 40 MG/ML
2000 INJECTION, SOLUTION INTRAVENOUS ONCE
Status: COMPLETED | OUTPATIENT
Start: 2023-07-24 | End: 2023-07-24

## 2023-07-24 RX ORDER — POLYETHYLENE GLYCOL 3350 17 G/17G
17 POWDER, FOR SOLUTION ORAL 2 TIMES DAILY
Status: DISCONTINUED | OUTPATIENT
Start: 2023-07-24 | End: 2023-07-26 | Stop reason: HOSPADM

## 2023-07-24 RX ORDER — SENNA AND DOCUSATE SODIUM 50; 8.6 MG/1; MG/1
2 TABLET, FILM COATED ORAL 2 TIMES DAILY
Status: DISCONTINUED | OUTPATIENT
Start: 2023-07-24 | End: 2023-07-26 | Stop reason: HOSPADM

## 2023-07-24 RX ORDER — LUBIPROSTONE 8 UG/1
8 CAPSULE ORAL 2 TIMES DAILY WITH MEALS
Status: DISPENSED | OUTPATIENT
Start: 2023-07-24 | End: 2023-07-25

## 2023-07-24 RX ORDER — QUETIAPINE FUMARATE 25 MG/1
50 TABLET, FILM COATED ORAL EVERY MORNING
Status: DISCONTINUED | OUTPATIENT
Start: 2023-07-25 | End: 2023-07-26 | Stop reason: HOSPADM

## 2023-07-24 RX ORDER — QUETIAPINE FUMARATE 100 MG/1
100 TABLET, FILM COATED ORAL NIGHTLY
Status: DISCONTINUED | OUTPATIENT
Start: 2023-07-24 | End: 2023-07-26 | Stop reason: HOSPADM

## 2023-07-24 RX ADMIN — TRAZODONE HYDROCHLORIDE 100 MG: 100 TABLET ORAL at 20:22

## 2023-07-24 RX ADMIN — FLUTICASONE PROPIONATE 1 SPRAY: 50 SPRAY, METERED NASAL at 09:52

## 2023-07-24 RX ADMIN — METOPROLOL SUCCINATE 50 MG: 50 TABLET, EXTENDED RELEASE ORAL at 09:55

## 2023-07-24 RX ADMIN — AMITRIPTYLINE HYDROCHLORIDE 25 MG: 25 TABLET, FILM COATED ORAL at 09:55

## 2023-07-24 RX ADMIN — OXYCODONE HYDROCHLORIDE AND ACETAMINOPHEN 1 TABLET: 5; 325 TABLET ORAL at 07:42

## 2023-07-24 RX ADMIN — CLONAZEPAM 0.5 MG: 0.5 TABLET ORAL at 07:42

## 2023-07-24 RX ADMIN — AMITRIPTYLINE HYDROCHLORIDE 25 MG: 25 TABLET, FILM COATED ORAL at 20:22

## 2023-07-24 RX ADMIN — SODIUM PHOSPHATE, MONOBASIC, MONOHYDRATE AND SODIUM PHOSPHATE, DIBASIC, ANHYDROUS 10 MMOL: 142; 276 INJECTION, SOLUTION INTRAVENOUS at 12:11

## 2023-07-24 RX ADMIN — METRONIDAZOLE 500 MG: 500 TABLET, FILM COATED ORAL at 05:49

## 2023-07-24 RX ADMIN — GABAPENTIN 300 MG: 300 CAPSULE ORAL at 16:12

## 2023-07-24 RX ADMIN — SODIUM CHLORIDE, PRESERVATIVE FREE 10 ML: 5 INJECTION INTRAVENOUS at 07:42

## 2023-07-24 RX ADMIN — SODIUM CHLORIDE, PRESERVATIVE FREE 10 ML: 5 INJECTION INTRAVENOUS at 20:21

## 2023-07-24 RX ADMIN — POLYETHYLENE GLYCOL 3350 17 G: 17 POWDER, FOR SOLUTION ORAL at 12:07

## 2023-07-24 RX ADMIN — CETIRIZINE HYDROCHLORIDE 5 MG: 10 TABLET ORAL at 09:55

## 2023-07-24 RX ADMIN — DESMOPRESSIN ACETATE 40 MG: 0.2 TABLET ORAL at 09:55

## 2023-07-24 RX ADMIN — LUBIPROSTONE 8 MCG: 8 CAPSULE, GELATIN COATED ORAL at 09:52

## 2023-07-24 RX ADMIN — GABAPENTIN 300 MG: 300 CAPSULE ORAL at 20:22

## 2023-07-24 RX ADMIN — Medication 600 MG: at 09:55

## 2023-07-24 RX ADMIN — GABAPENTIN 300 MG: 300 CAPSULE ORAL at 09:52

## 2023-07-24 RX ADMIN — DOCUSATE SODIUM 50 MG AND SENNOSIDES 8.6 MG 2 TABLET: 8.6; 5 TABLET, FILM COATED ORAL at 20:22

## 2023-07-24 RX ADMIN — DOCUSATE SODIUM 50 MG AND SENNOSIDES 8.6 MG 2 TABLET: 8.6; 5 TABLET, FILM COATED ORAL at 09:52

## 2023-07-24 RX ADMIN — QUETIAPINE FUMARATE 100 MG: 100 TABLET ORAL at 20:22

## 2023-07-24 RX ADMIN — CLONAZEPAM 0.5 MG: 0.5 TABLET ORAL at 16:13

## 2023-07-24 RX ADMIN — AMITRIPTYLINE HYDROCHLORIDE 25 MG: 25 TABLET, FILM COATED ORAL at 16:12

## 2023-07-24 RX ADMIN — NALOXEGOL OXALATE 12.5 MG: 12.5 TABLET, FILM COATED ORAL at 12:07

## 2023-07-24 RX ADMIN — PANTOPRAZOLE SODIUM 40 MG: 40 TABLET, DELAYED RELEASE ORAL at 16:12

## 2023-07-24 RX ADMIN — ESCITALOPRAM OXALATE 20 MG: 10 TABLET ORAL at 09:55

## 2023-07-24 RX ADMIN — ENOXAPARIN SODIUM 40 MG: 100 INJECTION SUBCUTANEOUS at 09:55

## 2023-07-24 RX ADMIN — HYPROMELLOSE 2910 2 DROP: 5 SOLUTION OPHTHALMIC at 09:58

## 2023-07-24 RX ADMIN — HYPROMELLOSE 2910 2 DROP: 5 SOLUTION OPHTHALMIC at 20:22

## 2023-07-24 RX ADMIN — Medication 600 MG: at 20:22

## 2023-07-24 RX ADMIN — OXYCODONE HYDROCHLORIDE AND ACETAMINOPHEN 1 TABLET: 5; 325 TABLET ORAL at 16:12

## 2023-07-24 RX ADMIN — BUSPIRONE HYDROCHLORIDE 10 MG: 10 TABLET ORAL at 20:22

## 2023-07-24 RX ADMIN — PANTOPRAZOLE SODIUM 40 MG: 40 TABLET, DELAYED RELEASE ORAL at 05:49

## 2023-07-24 RX ADMIN — BUSPIRONE HYDROCHLORIDE 10 MG: 10 TABLET ORAL at 09:55

## 2023-07-24 RX ADMIN — MAGNESIUM SULFATE HEPTAHYDRATE 2000 MG: 40 INJECTION, SOLUTION INTRAVENOUS at 10:06

## 2023-07-24 RX ADMIN — POLYETHYLENE GLYCOL 3350 17 G: 17 POWDER, FOR SOLUTION ORAL at 20:21

## 2023-07-24 ASSESSMENT — PAIN SCALES - GENERAL
PAINLEVEL_OUTOF10: 8
PAINLEVEL_OUTOF10: 8
PAINLEVEL_OUTOF10: 3

## 2023-07-24 NOTE — CARE COORDINATION
[address] Flow/Interdisciplinary Rounds Progress Note    Quality Flow Rounds held on July 24, 2023 at [facility] Attending:  Bedside Nurse, , and Nursing Unit Leadership        Anticipated Discharge Disposition:SNF    Readmission Risk              Risk of Unplanned Readmission:  28           Discussed patient goal for the day, patient clinical progression, and barriers to discharge.   The following Goal(s) of the Day/Commitment(s) have been identified:   spoke to iona with [facility] updated patient is a bed hold      Carole Claros RN  July 24, 2023

## 2023-07-24 NOTE — PLAN OF CARE
Problem: Pain  Goal: Verbalizes/displays adequate comfort level or baseline comfort level  7/24/2023 1623 by Ivette Treadwell RN  Outcome: Progressing  7/24/2023 0446 by Jamarcus Ku RN  Outcome: Progressing     Problem: Safety - Adult  Goal: Free from fall injury  7/24/2023 1623 by Ivette Treadwell RN  Outcome: Progressing  7/24/2023 0446 by Jamarcus Ku RN  Outcome: Progressing

## 2023-07-24 NOTE — PROGRESS NOTES
PROGRESS NOTE      PATIENT NAME: [facility] RECORD NO. [id number]  DATE: 7/24/2023    HD: # 5      Patient Active Problem List   Diagnosis    Frequency of urination    Backache    GERD (gastroesophageal reflux disease)    MVP (mitral valve prolapse)    Depression    Anxiety    Urinary retention    BENEDICT (acute kidney injury) ([address])    Dysphagia    Hiatal hernia    History of repair of hiatal hernia    Centrilobular emphysema (HCC)    Esophageal dilatation    Shock ([facility])    Fever    Critical illness polyneuropathy ([facility])    Gastric outlet obstruction    Recurrent UTI    Tracheostomy in place [facility])    H/O gastric ulcer    Hyperlipidemia    Essential hypertension    Tobacco abuse    Chronic respiratory failure with hypoxia ([facility])    S/P percutaneous endoscopic gastrostomy (PEG) tube placement ([facility])    S/P Nissen fundoplication (without gastrostomy tube) procedure    Anemia due to blood loss    Phlebitis after infusion    Esophagitis determined by endoscopy    Expressive aphasia    Transient speech disturbance    Speech disturbance    Coffee ground emesis    Acute cystitis without hematuria    Ulcerative esophagitis    Septicemia ([facility])    Lower abdominal pain    Ileus ([facility])    Chronic pain    Dyspnea on exertion    Orthostatic hypotension    Stage 3 chronic kidney disease ([facility])    Difficulty walking    Class 1 obesity in adult    Generalized abdominal pain    Multifocal pneumonia    Other constipation    Intractable abdominal pain    ESBL (extended spectrum beta-lactamase) producing bacteria infection    Complicated UTI (urinary tract infection)    COVID-19    Acute on chronic respiratory failure with hypoxia (HCC)    COVID    Hypoxia    Elevated C-reactive protein (CRP)    COPD (chronic obstructive pulmonary disease) ([facility])    Acute respiratory failure with hypoxia ([facility])    Colitis    History of ESBL E. coli infection    Acute metabolic encephalopathy    Congenital solitary kidney    Polypharmacy    Altered

## 2023-07-24 NOTE — PROGRESS NOTES
Physician Progress Note      Aditi Ybarra  [facility] #:                  [id number]  :                       1945  ADMIT DATE:       2023 8:42 AM  DISCH DATE:  RESPONDING  PROVIDER #:        Carrie Nettles MD          QUERY TEXT:    Pt admitted with abdominal pain and has  subacute colitis documented. GI   consult on  notes suspect viral colitis. per lab wbc 7.1 and crp of 11.9   on admission. was treated with po cipro and flagyl which is now discontinued. CT Abdomen on  showed milder subacute colitis should again be considered,   but no overt signs. If possible, please document the type of colitis in the   medical record. The medical record reflects the following:  Risk Factors: age. constipation  Clinical Indicators: admitted with abdominal pain and has  subacute colitis   documented. GI consult on  notes suspect viral colitis. per lab wbc 7.1   and crp of 11.9 on admission. was treated with po cipro and flagyl which is   now discontinued. CT Abdomen on  showed milder subacute colitis should   again be considered, but no overt signs. Treatment: GI consult, lab monitoring, po cipro and Flagyl    Thank Oskar Corral RN BSN  CCDS  Email [email or web address]  Cell [phone]  office hours M-F 6am to 2:30pm  Options provided:  -- Bacterial Colitis  -- viral colitis  -- Colitis due to other etiology, Please document other etiology. -- Colitis ruled out after study  -- Other - I will add my own diagnosis  -- Disagree - Not applicable / Not valid  -- Disagree - Clinically unable to determine / Unknown  -- Refer to Clinical Documentation Reviewer    PROVIDER RESPONSE TEXT:    This patient has bacterial colitis. Query created by: Vladimir Blum on 2023 7:24 AM      QUERY TEXT:    Pt admitted with Abdominal pain. Noted documentation of partial SBO on  by   ordered general surgery  consultant.  medicine progress notes  notes psbo   vs ileus Abdominal x

## 2023-07-25 PROBLEM — F33.2 SEVERE RECURRENT MAJOR DEPRESSION WITHOUT PSYCHOTIC FEATURES (HCC): Status: ACTIVE | Noted: 2023-07-25

## 2023-07-25 LAB
BACTERIA URNS QL MICRO: ABNORMAL
BILIRUB UR QL STRIP: NEGATIVE
CLARITY UR: ABNORMAL
COLOR UR: YELLOW
EPI CELLS #/AREA URNS HPF: ABNORMAL /HPF (ref 0–5)
GLUCOSE UR STRIP-MCNC: NEGATIVE MG/DL
HGB UR QL STRIP.AUTO: ABNORMAL
KETONES UR STRIP-MCNC: NEGATIVE MG/DL
LEUKOCYTE ESTERASE UR QL STRIP: ABNORMAL
NITRITE UR QL STRIP: NEGATIVE
PH UR STRIP: 7 [PH] (ref 5–8)
PROT UR STRIP-MCNC: NEGATIVE MG/DL
RBC #/AREA URNS HPF: ABNORMAL /HPF (ref 0–4)
SP GR UR STRIP: 1.01 (ref 1–1.03)
UROBILINOGEN UR STRIP-ACNC: NORMAL EU/DL (ref 0–1)
WBC #/AREA URNS HPF: ABNORMAL /HPF (ref 0–5)

## 2023-07-25 PROCEDURE — 90792 PSYCH DIAG EVAL W/MED SRVCS: CPT | Performed by: PSYCHIATRY & NEUROLOGY

## 2023-07-25 PROCEDURE — 87186 SC STD MICRODIL/AGAR DIL: CPT

## 2023-07-25 PROCEDURE — 51701 INSERT BLADDER CATHETER: CPT

## 2023-07-25 PROCEDURE — 87077 CULTURE AEROBIC IDENTIFY: CPT

## 2023-07-25 PROCEDURE — 1200000000 HC SEMI PRIVATE

## 2023-07-25 PROCEDURE — 51702 INSERT TEMP BLADDER CATH: CPT

## 2023-07-25 PROCEDURE — 6370000000 HC RX 637 (ALT 250 FOR IP)

## 2023-07-25 PROCEDURE — 6370000000 HC RX 637 (ALT 250 FOR IP): Performed by: NURSE PRACTITIONER

## 2023-07-25 PROCEDURE — 81001 URINALYSIS AUTO W/SCOPE: CPT

## 2023-07-25 PROCEDURE — 6370000000 HC RX 637 (ALT 250 FOR IP): Performed by: STUDENT IN AN ORGANIZED HEALTH CARE EDUCATION/TRAINING PROGRAM

## 2023-07-25 PROCEDURE — 2580000003 HC RX 258

## 2023-07-25 PROCEDURE — 87086 URINE CULTURE/COLONY COUNT: CPT

## 2023-07-25 PROCEDURE — 51798 US URINE CAPACITY MEASURE: CPT

## 2023-07-25 PROCEDURE — 6370000000 HC RX 637 (ALT 250 FOR IP): Performed by: EMERGENCY MEDICINE

## 2023-07-25 PROCEDURE — 99233 SBSQ HOSP IP/OBS HIGH 50: CPT | Performed by: INTERNAL MEDICINE

## 2023-07-25 PROCEDURE — 6360000002 HC RX W HCPCS

## 2023-07-25 RX ORDER — CIPROFLOXACIN 500 MG/1
500 TABLET, FILM COATED ORAL EVERY 12 HOURS SCHEDULED
Status: DISCONTINUED | OUTPATIENT
Start: 2023-07-25 | End: 2023-07-26 | Stop reason: HOSPADM

## 2023-07-25 RX ADMIN — HYPROMELLOSE 2910 2 DROP: 5 SOLUTION OPHTHALMIC at 09:21

## 2023-07-25 RX ADMIN — GABAPENTIN 300 MG: 300 CAPSULE ORAL at 15:02

## 2023-07-25 RX ADMIN — HYPROMELLOSE 2910 2 DROP: 5 SOLUTION OPHTHALMIC at 22:18

## 2023-07-25 RX ADMIN — TRAZODONE HYDROCHLORIDE 100 MG: 100 TABLET ORAL at 19:43

## 2023-07-25 RX ADMIN — BUSPIRONE HYDROCHLORIDE 10 MG: 10 TABLET ORAL at 09:22

## 2023-07-25 RX ADMIN — QUETIAPINE FUMARATE 100 MG: 100 TABLET ORAL at 19:43

## 2023-07-25 RX ADMIN — AMITRIPTYLINE HYDROCHLORIDE 25 MG: 25 TABLET, FILM COATED ORAL at 09:22

## 2023-07-25 RX ADMIN — QUETIAPINE FUMARATE 50 MG: 100 TABLET ORAL at 09:21

## 2023-07-25 RX ADMIN — PANTOPRAZOLE SODIUM 40 MG: 40 TABLET, DELAYED RELEASE ORAL at 15:02

## 2023-07-25 RX ADMIN — BENZONATATE 100 MG: 100 CAPSULE ORAL at 09:42

## 2023-07-25 RX ADMIN — OXYCODONE HYDROCHLORIDE AND ACETAMINOPHEN 1 TABLET: 5; 325 TABLET ORAL at 10:49

## 2023-07-25 RX ADMIN — FLUTICASONE PROPIONATE 1 SPRAY: 50 SPRAY, METERED NASAL at 09:20

## 2023-07-25 RX ADMIN — OXYCODONE HYDROCHLORIDE AND ACETAMINOPHEN 1 TABLET: 5; 325 TABLET ORAL at 19:03

## 2023-07-25 RX ADMIN — PANTOPRAZOLE SODIUM 40 MG: 40 TABLET, DELAYED RELEASE ORAL at 06:23

## 2023-07-25 RX ADMIN — HYPROMELLOSE 2910 2 DROP: 5 SOLUTION OPHTHALMIC at 02:17

## 2023-07-25 RX ADMIN — GABAPENTIN 300 MG: 300 CAPSULE ORAL at 22:18

## 2023-07-25 RX ADMIN — CLONAZEPAM 0.5 MG: 0.5 TABLET ORAL at 10:49

## 2023-07-25 RX ADMIN — Medication 600 MG: at 22:18

## 2023-07-25 RX ADMIN — OXYCODONE HYDROCHLORIDE AND ACETAMINOPHEN 1 TABLET: 5; 325 TABLET ORAL at 02:15

## 2023-07-25 RX ADMIN — Medication 600 MG: at 09:22

## 2023-07-25 RX ADMIN — SODIUM CHLORIDE, PRESERVATIVE FREE 10 ML: 5 INJECTION INTRAVENOUS at 09:23

## 2023-07-25 RX ADMIN — CLONAZEPAM 0.5 MG: 0.5 TABLET ORAL at 19:04

## 2023-07-25 RX ADMIN — BUSPIRONE HYDROCHLORIDE 10 MG: 10 TABLET ORAL at 19:45

## 2023-07-25 RX ADMIN — POLYETHYLENE GLYCOL 3350 17 G: 17 POWDER, FOR SOLUTION ORAL at 19:55

## 2023-07-25 RX ADMIN — ACETAMINOPHEN 650 MG: 325 TABLET ORAL at 19:44

## 2023-07-25 RX ADMIN — NALOXEGOL OXALATE 12.5 MG: 12.5 TABLET, FILM COATED ORAL at 06:23

## 2023-07-25 RX ADMIN — ENOXAPARIN SODIUM 40 MG: 100 INJECTION SUBCUTANEOUS at 09:22

## 2023-07-25 RX ADMIN — ESCITALOPRAM OXALATE 20 MG: 10 TABLET ORAL at 09:21

## 2023-07-25 RX ADMIN — DESMOPRESSIN ACETATE 40 MG: 0.2 TABLET ORAL at 09:22

## 2023-07-25 RX ADMIN — GABAPENTIN 300 MG: 300 CAPSULE ORAL at 09:21

## 2023-07-25 RX ADMIN — DOCUSATE SODIUM 50 MG AND SENNOSIDES 8.6 MG 2 TABLET: 8.6; 5 TABLET, FILM COATED ORAL at 19:43

## 2023-07-25 RX ADMIN — DOCUSATE SODIUM 50 MG AND SENNOSIDES 8.6 MG 2 TABLET: 8.6; 5 TABLET, FILM COATED ORAL at 09:21

## 2023-07-25 RX ADMIN — HYPROMELLOSE 2910 2 DROP: 5 SOLUTION OPHTHALMIC at 15:03

## 2023-07-25 RX ADMIN — CETIRIZINE HYDROCHLORIDE 5 MG: 10 TABLET ORAL at 09:22

## 2023-07-25 RX ADMIN — CLONAZEPAM 0.5 MG: 0.5 TABLET ORAL at 02:15

## 2023-07-25 RX ADMIN — SODIUM CHLORIDE, PRESERVATIVE FREE 10 ML: 5 INJECTION INTRAVENOUS at 19:54

## 2023-07-25 ASSESSMENT — PAIN SCALES - GENERAL
PAINLEVEL_OUTOF10: 6
PAINLEVEL_OUTOF10: 7
PAINLEVEL_OUTOF10: 8

## 2023-07-25 ASSESSMENT — PAIN DESCRIPTION - LOCATION: LOCATION: ABDOMEN

## 2023-07-25 ASSESSMENT — PAIN DESCRIPTION - DESCRIPTORS: DESCRIPTORS: PATIENT UNABLE TO DESCRIBE

## 2023-07-25 ASSESSMENT — PAIN DESCRIPTION - ORIENTATION: ORIENTATION: RIGHT;LEFT;LOWER

## 2023-07-25 ASSESSMENT — PAIN - FUNCTIONAL ASSESSMENT: PAIN_FUNCTIONAL_ASSESSMENT: ACTIVITIES ARE NOT PREVENTED

## 2023-07-25 NOTE — PLAN OF CARE
Problem: Pain  Goal: Verbalizes/displays adequate comfort level or baseline comfort level  7/25/2023 1316 by Erika Reyes RN  Outcome: Progressing  7/25/2023 0106 by Nicole Joseph RN  Outcome: Progressing     Problem: Safety - Adult  Goal: Free from fall injury  7/25/2023 1316 by Erika Reyes RN  Outcome: Progressing  7/25/2023 0106 by Nicole Joseph RN  Outcome: Progressing     Problem: Discharge Planning  Goal: Discharge to home or other facility with appropriate resources  7/25/2023 1316 by Erika Reyes RN  Outcome: Progressing  7/25/2023 0106 by Nicole Joseph RN  Outcome: Progressing     Problem: Skin/Tissue Integrity  Goal: Absence of new skin breakdown  Description: 1. Monitor for areas of redness and/or skin breakdown  2. Assess vascular access sites hourly  3. Every 4-6 hours minimum:  Change oxygen saturation probe site  4. Every 4-6 hours:  If on nasal continuous positive airway pressure, respiratory therapy assess nares and determine need for appliance change or resting period.   7/25/2023 1316 by Erika Reyes RN  Outcome: Progressing  7/25/2023 0106 by Nicole Joseph RN  Outcome: Progressing     Problem: Chronic Conditions and Co-morbidities  Goal: Patient's chronic conditions and co-morbidity symptoms are monitored and maintained or improved  7/25/2023 1316 by Erika Reyes RN  Outcome: Progressing  7/25/2023 0106 by Nicole Joseph RN  Outcome: Progressing     Problem: Musculoskeletal - Adult  Goal: Return mobility to safest level of function  7/25/2023 1316 by Erika Reyes RN  Outcome: Progressing  7/25/2023 0106 by Nicole Joseph RN  Outcome: Progressing     Problem: ABCDS Injury Assessment  Goal: Absence of physical injury  7/25/2023 1316 by Erika Reyes RN  Outcome: Progressing  7/25/2023 0106 by Nicole Joseph RN  Outcome: Progressing

## 2023-07-25 NOTE — PLAN OF CARE
Problem: Pain  Goal: Verbalizes/displays adequate comfort level or baseline comfort level  7/25/2023 0106 by Ronel Nina RN  Outcome: Progressing  7/24/2023 1623 by Cordelia Gupta RN  Outcome: Progressing     Problem: Safety - Adult  Goal: Free from fall injury  7/25/2023 0106 by Ronel Nina RN  Outcome: Progressing  7/24/2023 1623 by Cordelia Gupta RN  Outcome: Progressing     Problem: Discharge Planning  Goal: Discharge to home or other facility with appropriate resources  Outcome: Progressing     Problem: Skin/Tissue Integrity  Goal: Absence of new skin breakdown  Description: 1. Monitor for areas of redness and/or skin breakdown  2. Assess vascular access sites hourly  3. Every 4-6 hours minimum:  Change oxygen saturation probe site  4. Every 4-6 hours:  If on nasal continuous positive airway pressure, respiratory therapy assess nares and determine need for appliance change or resting period.   Outcome: Progressing     Problem: Chronic Conditions and Co-morbidities  Goal: Patient's chronic conditions and co-morbidity symptoms are monitored and maintained or improved  Outcome: Progressing     Problem: Musculoskeletal - Adult  Goal: Return mobility to safest level of function  Outcome: Progressing     Problem: ABCDS Injury Assessment  Goal: Absence of physical injury  Outcome: Progressing

## 2023-07-25 NOTE — PROGRESS NOTES
Attending Physician Statement  I have discussed the care of Lluvia Stevens with the resident team. I have examined the patient myself and taken ros and hpi , including pertinent history and exam findings,  with the resident. I have reviewed the key elements of all parts of the encounter with the resident. I agree with the assessment, plan and orders as documented by the resident. Principal Problem:    SBO (small bowel obstruction) ([facility])  Active Problems:    Intractable abdominal pain    COPD (chronic obstructive pulmonary disease) ([facility])    Colitis    Urinary retention    S/P Nissen fundoplication (without gastrostomy tube) procedure    Paraesophageal hernia    History of depression    Nausea and vomiting  Resolved Problems:    Abdominal pain    Patient did have multiple BMs yesterday  However this morning again went into urinary retention->400 mill postvoid residual this morning  Note patient is on amitriptyline 25 mg 3 times daily-likely contributing to urinary retention we will discontinue. Patient is very worried about discontinuing this medication since her urologist had started it-we will reach out to urology as well  Straight cath as needed, patient is refusing Saldana-this was discussed in detail,, will wait for urology recommendation. Patient extremely tearful states\" I do not want to live anymore\"-we will get psych evaluation as well. She is on multiple medications for anxiety and depression. Anticipate discharge to nursing facility later today    Full note to follow.       Electronically signed by Leesa Farrell MD

## 2023-07-25 NOTE — CONSULTS
Department of Psychiatry   Psychiatric Assessment      Thank you very much for allowing us to participate in the care of this patient. Reason for Consult: Depression and anxiety medication management    HISTORY OF PRESENT ILLNESS:    Patient is a 70-year-old female with extensive history of COPD hypertension and GERD depression and anxiety admitted for abdominal pain and nausea worsening for the last few days from the nursing facility. Psychiatry is consulted after she made statements about not wanting to live. When approached patient was tearful stating that she is feeling very anxious and depressed. Reports that she was very functional recently and was playing cards and enjoying time with her friends at the nursing facility however lately his ongoing medical issues have been a huge bother for her. Reports that she is having a lot of anticipatory anxiety about having an indwelling Saldana and how that will affect her day-to-day life with the nursing facility. Reports that she is dealing with passive suicidal thoughts of wishing somebody would kill her. Denies any active thoughts to kill herself. Denies any suicidal ideation plan or intent. Does report feeling sad and down for the last several weeks. Mentions that she is feeling more anxious that her amitriptyline was stopped. Discussed at length about the risks of the medication due to ongoing urinary retention and she was agreeable to go off of the medication. PSYCHIATRIC HISTORY:  Currently her psychotropic medications are management expectoration at the nursing facility. Denies any previous psychiatric admissions or suicide attempts    [facility] Psychiatric Review of Systems         Obsessions and Compulsions: Denies       Kimmy or Hypomania: Denies     Hallucinations: Denies     Panic Attacks: Endorses     Delusions:  Denies     Phobias:  Denies     Trauma: Denies    Prior to Admission medications    Medication Sig Start Date End Date Taking? [employer].     Ulcerative esophagitis     Urinary tract infection, site not specified     Uses wheelchair     Wears glasses     Wellness examination     Dr. Ma Bosworth seen in Jan 2020       Past Surgical History:        Procedure Laterality Date    APPENDECTOMY      CARPAL [address], LAPAROSCOPIC N/A 05/22/2020    XI LAPAROSCOPIC ROBOTIC CHOLECYSTECTOMY, PYLOROPLASTY performed by Denilson Huerta MD at [facility]      COLONOSCOPY N/A 1/28/2022    COLONOSCOPY POLYPECTOMY HOT performed by Haritha Mann MD at [address]      ERCP  05/21/2020    with stent insertion, balloon dilation, sphinctereotomy    ERCP  05/21/2020    ** CASE IN OR WITY GI STAFF** ERCP STENT INSERTION performed by Haritha Mann MD at [facility]    ERCP  05/21/2020    ** CASE IN OR WITH GI STAFF**ERCP SPHINCTER/PAPILLOTOMY performed by Haritha Mann MD at [facility]    ERCP  05/21/2020    ** CASE IN OR WITH GI STAFF**ERCP DILATION BALLOON performed by Haritha Mann MD at [facility]    ERCP N/A 2/8/2021    ERCP STENT REMOVAL performed by Alberto Gimenez MD at [facility]    ERCP  2/8/2021    ERCP STONE REMOVAL performed by Alberto Giemnez MD at [facility]    GASTRIC FUNDOPLICATION N/A 43/67/1501    XI LAPAROSCOPIC ROBOTIC [address], CHERYL FUNDOPLICATION performed by Denilson Huerta MD at [address] [place] [address] 03/27/2020    EGD PEG TUBE PLACEMENT performed by Denilson Huerta MD at [address] [place] [address] N/A 2/8/2021    **CASE IN O.R. W/ GI STAFF - EGD WITH REMOVAL PEG TUBE performed by Alberto Gimenez MD at [facility]    HAND SURGERY      [facility] [place]. CATH POWER PICC TRIPLE  03/04/2020         HERNIA REPAIR      Hiatal hernia    HYSTERECTOMY (CERVIX STATUS UNKNOWN)      Abdominal    IR NONTUNNELED VASCULAR CATHETER  5/10/2022    IR NONTUNNELED VASCULAR CATHETER 5/10/2022 Clinton Sood MD [facility] SPECIAL

## 2023-07-26 VITALS
HEIGHT: 62 IN | HEART RATE: 76 BPM | WEIGHT: 187.39 LBS | TEMPERATURE: 98.8 F | SYSTOLIC BLOOD PRESSURE: 111 MMHG | OXYGEN SATURATION: 98 % | DIASTOLIC BLOOD PRESSURE: 70 MMHG | RESPIRATION RATE: 16 BRPM | BODY MASS INDEX: 34.48 KG/M2

## 2023-07-26 LAB
BASOPHILS # BLD: 0.04 K/UL (ref 0–0.2)
BASOPHILS NFR BLD: 1 % (ref 0–2)
EOSINOPHIL # BLD: 0.06 K/UL (ref 0–0.44)
EOSINOPHILS RELATIVE PERCENT: 1 % (ref 1–4)
ERYTHROCYTE [DISTWIDTH] IN BLOOD BY AUTOMATED COUNT: 14.5 % (ref 11.8–14.4)
HCT VFR BLD AUTO: 42.8 % (ref 36.3–47.1)
HGB BLD-MCNC: 13.6 G/DL (ref 11.9–15.1)
IMM GRANULOCYTES # BLD AUTO: 0.11 K/UL (ref 0–0.3)
IMM GRANULOCYTES NFR BLD: 2 %
LYMPHOCYTES NFR BLD: 1.24 K/UL (ref 1.1–3.7)
LYMPHOCYTES RELATIVE PERCENT: 27 % (ref 24–43)
MCH RBC QN AUTO: 29.6 PG (ref 25.2–33.5)
MCHC RBC AUTO-ENTMCNC: 31.8 G/DL (ref 28.4–34.8)
MCV RBC AUTO: 93 FL (ref 82.6–102.9)
MONOCYTES NFR BLD: 0.82 K/UL (ref 0.1–1.2)
MONOCYTES NFR BLD: 18 % (ref 3–12)
NEUTROPHILS NFR BLD: 51 % (ref 36–65)
NEUTS SEG NFR BLD: 2.4 K/UL (ref 1.5–8.1)
NRBC BLD-RTO: 0 PER 100 WBC
PLATELET # BLD AUTO: 155 K/UL (ref 138–453)
PMV BLD AUTO: 10.9 FL (ref 8.1–13.5)
RBC # BLD AUTO: 4.6 M/UL (ref 3.95–5.11)
RBC # BLD: ABNORMAL 10*6/UL
WBC OTHER # BLD: 4.7 K/UL (ref 3.5–11.3)

## 2023-07-26 PROCEDURE — 6370000000 HC RX 637 (ALT 250 FOR IP)

## 2023-07-26 PROCEDURE — 6370000000 HC RX 637 (ALT 250 FOR IP): Performed by: STUDENT IN AN ORGANIZED HEALTH CARE EDUCATION/TRAINING PROGRAM

## 2023-07-26 PROCEDURE — 2580000003 HC RX 258

## 2023-07-26 PROCEDURE — 99239 HOSP IP/OBS DSCHRG MGMT >30: CPT | Performed by: INTERNAL MEDICINE

## 2023-07-26 PROCEDURE — 6360000002 HC RX W HCPCS

## 2023-07-26 PROCEDURE — 6370000000 HC RX 637 (ALT 250 FOR IP): Performed by: EMERGENCY MEDICINE

## 2023-07-26 PROCEDURE — 36415 COLL VENOUS BLD VENIPUNCTURE: CPT

## 2023-07-26 PROCEDURE — 87040 BLOOD CULTURE FOR BACTERIA: CPT

## 2023-07-26 PROCEDURE — 85027 COMPLETE CBC AUTOMATED: CPT

## 2023-07-26 RX ORDER — CLONAZEPAM 0.5 MG/1
0.5 TABLET ORAL 3 TIMES DAILY PRN
Qty: 12 TABLET | Refills: 0 | Status: SHIPPED | OUTPATIENT
Start: 2023-07-26 | End: 2023-07-31

## 2023-07-26 RX ORDER — OXYCODONE HYDROCHLORIDE AND ACETAMINOPHEN 5; 325 MG/1; MG/1
1 TABLET ORAL EVERY 6 HOURS PRN
Qty: 12 TABLET | Refills: 0 | Status: SHIPPED | OUTPATIENT
Start: 2023-07-26 | End: 2023-07-26 | Stop reason: SDUPTHER

## 2023-07-26 RX ORDER — CLONAZEPAM 0.5 MG/1
0.5 TABLET ORAL 3 TIMES DAILY PRN
Qty: 15 TABLET | Refills: 0 | Status: SHIPPED | OUTPATIENT
Start: 2023-07-26 | End: 2023-07-26 | Stop reason: SDUPTHER

## 2023-07-26 RX ORDER — BENZONATATE 100 MG/1
100 CAPSULE ORAL 3 TIMES DAILY PRN
Qty: 15 CAPSULE | Refills: 0 | Status: SHIPPED | OUTPATIENT
Start: 2023-07-26 | End: 2023-08-02

## 2023-07-26 RX ORDER — OXYCODONE HYDROCHLORIDE AND ACETAMINOPHEN 5; 325 MG/1; MG/1
1 TABLET ORAL EVERY 6 HOURS PRN
Qty: 12 TABLET | Refills: 0 | Status: SHIPPED | OUTPATIENT
Start: 2023-07-26 | End: 2023-07-29

## 2023-07-26 RX ORDER — SENNA AND DOCUSATE SODIUM 50; 8.6 MG/1; MG/1
2 TABLET, FILM COATED ORAL 2 TIMES DAILY PRN
Qty: 30 TABLET | Refills: 0 | Status: SHIPPED | OUTPATIENT
Start: 2023-07-26

## 2023-07-26 RX ORDER — CIPROFLOXACIN 500 MG/1
500 TABLET, FILM COATED ORAL EVERY 12 HOURS SCHEDULED
Qty: 14 TABLET | Refills: 0 | Status: SHIPPED | OUTPATIENT
Start: 2023-07-26 | End: 2023-08-02

## 2023-07-26 RX ADMIN — QUETIAPINE FUMARATE 50 MG: 100 TABLET ORAL at 08:48

## 2023-07-26 RX ADMIN — METOPROLOL SUCCINATE 50 MG: 50 TABLET, EXTENDED RELEASE ORAL at 08:48

## 2023-07-26 RX ADMIN — DESMOPRESSIN ACETATE 40 MG: 0.2 TABLET ORAL at 08:49

## 2023-07-26 RX ADMIN — GABAPENTIN 300 MG: 300 CAPSULE ORAL at 08:48

## 2023-07-26 RX ADMIN — QUETIAPINE FUMARATE 100 MG: 100 TABLET ORAL at 20:16

## 2023-07-26 RX ADMIN — PANTOPRAZOLE SODIUM 40 MG: 40 TABLET, DELAYED RELEASE ORAL at 14:58

## 2023-07-26 RX ADMIN — TRAZODONE HYDROCHLORIDE 100 MG: 100 TABLET ORAL at 20:15

## 2023-07-26 RX ADMIN — DOCUSATE SODIUM 50 MG AND SENNOSIDES 8.6 MG 2 TABLET: 8.6; 5 TABLET, FILM COATED ORAL at 20:15

## 2023-07-26 RX ADMIN — BUSPIRONE HYDROCHLORIDE 10 MG: 10 TABLET ORAL at 08:49

## 2023-07-26 RX ADMIN — Medication 600 MG: at 08:49

## 2023-07-26 RX ADMIN — ESCITALOPRAM OXALATE 20 MG: 10 TABLET ORAL at 08:48

## 2023-07-26 RX ADMIN — CLONAZEPAM 0.5 MG: 0.5 TABLET ORAL at 08:49

## 2023-07-26 RX ADMIN — SODIUM CHLORIDE, PRESERVATIVE FREE 10 ML: 5 INJECTION INTRAVENOUS at 08:49

## 2023-07-26 RX ADMIN — Medication 600 MG: at 20:16

## 2023-07-26 RX ADMIN — BUSPIRONE HYDROCHLORIDE 10 MG: 10 TABLET ORAL at 20:15

## 2023-07-26 RX ADMIN — CIPROFLOXACIN 500 MG: 500 TABLET, FILM COATED ORAL at 00:42

## 2023-07-26 RX ADMIN — CIPROFLOXACIN 500 MG: 500 TABLET, FILM COATED ORAL at 20:15

## 2023-07-26 RX ADMIN — HYPROMELLOSE 2910 2 DROP: 5 SOLUTION OPHTHALMIC at 02:16

## 2023-07-26 RX ADMIN — OXYCODONE HYDROCHLORIDE AND ACETAMINOPHEN 1 TABLET: 5; 325 TABLET ORAL at 17:17

## 2023-07-26 RX ADMIN — OXYCODONE HYDROCHLORIDE AND ACETAMINOPHEN 1 TABLET: 5; 325 TABLET ORAL at 08:49

## 2023-07-26 RX ADMIN — GABAPENTIN 300 MG: 300 CAPSULE ORAL at 20:15

## 2023-07-26 RX ADMIN — HYPROMELLOSE 2910 2 DROP: 5 SOLUTION OPHTHALMIC at 20:16

## 2023-07-26 RX ADMIN — NALOXEGOL OXALATE 12.5 MG: 12.5 TABLET, FILM COATED ORAL at 06:18

## 2023-07-26 RX ADMIN — CETIRIZINE HYDROCHLORIDE 5 MG: 10 TABLET ORAL at 08:48

## 2023-07-26 RX ADMIN — GABAPENTIN 300 MG: 300 CAPSULE ORAL at 14:58

## 2023-07-26 RX ADMIN — FLUTICASONE PROPIONATE 1 SPRAY: 50 SPRAY, METERED NASAL at 08:49

## 2023-07-26 RX ADMIN — HYPROMELLOSE 2910 2 DROP: 5 SOLUTION OPHTHALMIC at 08:50

## 2023-07-26 RX ADMIN — CIPROFLOXACIN 500 MG: 500 TABLET, FILM COATED ORAL at 08:48

## 2023-07-26 RX ADMIN — ENOXAPARIN SODIUM 40 MG: 100 INJECTION SUBCUTANEOUS at 08:49

## 2023-07-26 RX ADMIN — CLONAZEPAM 0.5 MG: 0.5 TABLET ORAL at 17:17

## 2023-07-26 RX ADMIN — PANTOPRAZOLE SODIUM 40 MG: 40 TABLET, DELAYED RELEASE ORAL at 06:18

## 2023-07-26 ASSESSMENT — PAIN SCALES - GENERAL
PAINLEVEL_OUTOF10: 7
PAINLEVEL_OUTOF10: 4
PAINLEVEL_OUTOF10: 7
PAINLEVEL_OUTOF10: 3

## 2023-07-26 NOTE — PROGRESS NOTES
Attending Physician Statement  I have discussed the care of Reena Newman with the resident team. I have examined the patient myself and taken ros and hpi , including pertinent history and exam findings,  with the resident. I have reviewed the key elements of all parts of the encounter with the resident. I agree with the assessment, plan and orders as documented by the resident. Principal Problem:    SBO (small bowel obstruction) ([facility])  Active Problems:    Intractable abdominal pain    COPD (chronic obstructive pulmonary disease) ([facility])    Colitis    Urinary retention    S/P Nissen fundoplication (without gastrostomy tube) procedure    Paraesophageal hernia    History of depression    Nausea and vomiting    Severe recurrent major depression without psychotic features ([address])  Resolved Problems:    Abdominal pain    Went into urinary retention yesterday secondary to UTI based on UA results. Saldana inserted. 1 episode of fever yesterday evening. Amitriptyline discontinued for concern of contributing to urinary retention  Psych input appreciated  We will observe until this evening if no further fevers can be discharged on p.o. antibiotics. Urine culture results to be followed outpatient    Full note to follow.       Electronically signed by Preston Foster MD

## 2023-07-26 NOTE — PLAN OF CARE
Problem: Safety - Adult  Goal: Free from fall injury  7/26/2023 1635 by Linda Roberts RN  Outcome: Progressing  7/26/2023 5162 by Robert Perez RN  Outcome: Progressing

## 2023-07-26 NOTE — CARE COORDINATION
Discharge [address] Case Management Department  Written by: Vega Robbins RN    Patient Name: Prashanth Persaud  Attending Provider: Charlene Alcazar MD  Admit Date: 2023  8:42 AM  MRN: [id number]  Account: [de-identified]                     : 1945  Discharge Date: 23  Ps Dr Zack Medrano requesting percocet & klonopin script  Call report to [phone] transport request to [facility]  @ 20:00    14:24 called lissette with HR updated  Faxed avs and scripts  Met with patient updated will notify family herself      Disposition: Charles Robbins RN

## 2023-07-26 NOTE — PROGRESS NOTES
[facility]  Internal Medicine Teaching Residency Program  Inpatient Daily Progress Note  ______________________________________________________________________________    Patient: Monica De Witt  YOB: 1945   QQF:[id number]    Acct: [de-identified]     Room: [address] [place] [address]  Admit date: 7/18/2023  Today's date: 07/26/23  Number of days in the hospital: 7    SUBJECTIVE   Admitting Diagnosis: SBO (small bowel obstruction) ([address])  CC: Abdominal pain  Pt examined at bedside. Chart & results reviewed. Patient is feeling better overall. Has bowel movement this morning. Denies nausea vomiting or abdominal pain. Able to eat and drink. Denies any symptoms this AM.    ROS:  Constitutional:  negative for chills, fevers, sweats  Respiratory:  negative for cough, dyspnea on exertion, hemoptysis, shortness of breath, wheezing  Cardiovascular:  negative for chest pain, chest pressure/discomfort, lower extremity edema, palpitations  Gastrointestinal:  negative for abdominal pain, constipation, diarrhea, nausea, vomiting  Neurological:  negative for dizziness, headache    BRIEF HISTORY     70-year-old female with past medical history significant for hypertension, major depressive disorder on multiple psych medicines COPD presented with nausea vomiting and abdominal pain. Initially kept in the observation unit and transferred to medical service for further management. CT scan was concerning for subacute colitis and was treated with Cipro and Flagyl with resolution of abdominal pain. Subsequently patient developed acute constipation, concerning for partial SBO versus ileus. General surgery was consulted and was managed with conservative regimen, stool softener and then was and opioid reversal agent lubiprostone and naloxone gel. Ileus resolved. Over the course of admission, patient was also found to have urinary retention and Saldana's catheter was placed.   Urology was concerns for sepsis. Afebrile since yesterday. On ciprofloxacin 500 mg twice daily for 7 days. Urinary retention    Status post Saldana's catheter per urology recommendation. Amitriptyline has been discontinued due to anticholinergic effect. Doree Primes has been discontinued. Urology recommend aggressive treatment for constipation and minimize narcotics. Major depressive disorder recurrent severe without psychosis  Anxiety disorder    Psychiatry was consulted and they recommend increasing BuSpar to 10 mg 3 times daily. Other home medication has been resumed including Seroquel trazodone and Lexapro. Amitriptyline has been discontinued. No need of I admission. Partial SBO versus ileus: Resolved    Subacute colitis: Improved  Treated with antibiotic, Cipro and Flagyl. Essential hypertension:  Stable on home medications, Toprol-XL 50 mg daily. Diet: adult regular diet  DVT ppx : Lovenox  GI ppx: pantoprazole    PT/OT: Consulted  Discharge Planning / SW: Discharge this evening if remain afebrile. Nicci Davidson MD  Internal Medicine Resident, PGY-3  [address];  [place], [place]  7/26/2023, 11:41 AM

## 2023-07-27 LAB
MICROORGANISM SPEC CULT: ABNORMAL
SPECIMEN DESCRIPTION: ABNORMAL

## 2023-07-30 LAB
MICROORGANISM SPEC CULT: NORMAL
SERVICE CMNT-IMP: NORMAL
SPECIMEN DESCRIPTION: NORMAL

## 2023-07-30 NOTE — DISCHARGE SUMMARY
[id number]     Department of Internal Medicine - Staff Internal Medicine Teaching Service    INPATIENT DISCHARGE SUMMARY      Patient Identification:  Mary Bello is a 68 y.o. female. :  1945  MRN: [id number]     Acct: [de-identified]   PCP: No primary care provider on file. Admit Date:  2023  Discharge date and time: 2023  8:36 PM   Attending Provider: Dr. Yarely Madison Problem Lists:  Principal Problem:    SBO (small bowel obstruction) ([facility])  Active Problems:    Intractable abdominal pain    COPD (chronic obstructive pulmonary disease) ([facility])    Colitis    Urinary retention    S/P Nissen fundoplication (without gastrostomy tube) procedure    Paraesophageal hernia    History of depression    Nausea and vomiting    Severe recurrent major depression without psychotic features ([address])  Resolved Problems:    Abdominal pain      HOSPITAL STAY     Brief Inpatient course:   Mary Bello is a 68 y.o. female with past medical history significant for COPD, hypertension and hiatal hernia who was admitted for the management of subacute colitis presented to the emergency department with Abdominal pain and emesis. CT abdomen and pelvis showed subacute colitis and hiatal hernia. Urinalysis was negative. Gastroenterology was consulted, advised antibiotics and outpatient EGD. She was started on ciprofloxacin and metronidazole. Patient had chronic constipation. X-ray KUB showed features concerning for ileus/SBO General surgery was consulted for concern of ileus, advised conservative management. Constipation was treated with enema and naloxegol. During the course in the hospital, patient had persistent urinary retention. Urology was consulted, chu's catheterization was done per urology. Amitriptyline and sanctura were discontinued.   Psychiatry was consulted for the concern of depression and anxiety, mg by mouth dailyHistorical Med      polyethylene glycol (GLYCOLAX) 17 g packet Take 17 g by mouth daily as needed for Constipation, Disp-527 g, [id number]      Folic Acid-Cholecalciferol 1-2000 MG-UNIT TABS Take by mouthHistorical Med      busPIRone (BUSPAR) 10 MG tablet Take 1 tablet by mouth 2 times daily, R-0DC to SNF      bisacodyl (DULCOLAX) 10 MG suppository Place 10 mg rectally daily as needed for Constipation PRN for constipation no BM x4 days. Historical Med      ferrous sulfate (FE TABS 325) 325 (65 Fe) MG EC tablet Take 1 tablet by mouth daily (with breakfast), Disp-90 tablet, R-3Print      metoprolol succinate (TOPROL XL) 25 MG extended release tablet Take 1 tablet by mouth daily, Disp-30 tablet, R-3DC to SNF       ! ! - Potential duplicate medications found. Please discuss with provider. STOP taking these medications       famotidine (PEPCID) 20 MG tablet Comments:   Reason for Stopping:         amitriptyline (ELAVIL) 25 MG tablet Comments:   Reason for Stopping:         trospium (SANCTURA) 20 MG tablet Comments:   Reason for Stopping:         simvastatin (ZOCOR) 40 MG tablet Comments:   Reason for Stopping:               Activity: activity as tolerated    Diet: regular diet    Follow-up:    Jason De Jesus MD  [address] [facility].; [address] [place] [address]  [phone]    Schedule an appointment as soon as possible for a visit in 4 week(s)      Logan Richardson MD  [address] [place] [address], [address] [place] [address]  [phone]    Schedule an appointment as soon as possible for a visit  Call for hospital follow up and reschedule appointemnt for endoscopic procedures    [facility] INTERNAL MEDICINE  [facility] [id number]  Schedule an appointment as soon as possible for a visit in 1 week(s)  Post discharge follow-up. Patient Instructions  You have been admitted to the hospital with subacute colitis and urinary retention.    Please follow up with urology outpatient within 1

## 2023-07-31 LAB
MICROORGANISM SPEC CULT: NORMAL
SERVICE CMNT-IMP: NORMAL
SPECIMEN DESCRIPTION: NORMAL

## 2023-08-02 DIAGNOSIS — N30.00 ACUTE CYSTITIS WITHOUT HEMATURIA: Primary | ICD-10-CM

## 2023-08-09 ENCOUNTER — TELEPHONE (OUTPATIENT)
Dept: GASTROENTEROLOGY | Age: 78
End: 2023-08-09

## 2023-08-09 NOTE — TELEPHONE ENCOUNTER
Jonas Meigs M.D. to check on clearance faxed 6/22/23 and per Julianna Herman she is not their patient. Called the facility and spoke to Nacho Yarbrough. She stated that Dr. Pia Serna is no longer there. The patient now sees Wilberto Her M.D. Clearance faxed to [phone].     Wilberto Her  P- [phone]  F- [phone]

## 2023-08-10 NOTE — TELEPHONE ENCOUNTER
Received clearance from Dr Isabelle Osorio and the patient has been cleared. LVM advising Danny at the facility.  -amyt

## 2023-08-15 ENCOUNTER — TRANSCRIBE ORDERS (OUTPATIENT)
Dept: ADMINISTRATIVE | Age: 78
End: 2023-08-15

## 2023-08-15 DIAGNOSIS — N63.24 MASS OF LOWER INNER QUADRANT OF LEFT BREAST: Primary | ICD-10-CM

## 2023-08-31 ENCOUNTER — HOSPITAL ENCOUNTER (OUTPATIENT)
Dept: WOMENS IMAGING | Age: 78
Discharge: HOME | End: 2023-09-02
Payer: MEDICARE

## 2023-08-31 DIAGNOSIS — N63.24 MASS OF LOWER INNER QUADRANT OF LEFT BREAST: ICD-10-CM

## 2023-08-31 PROCEDURE — 76642 ULTRASOUND BREAST LIMITED: CPT

## 2023-08-31 PROCEDURE — G0279 TOMOSYNTHESIS, MAMMO: HCPCS

## 2023-09-27 ENCOUNTER — HOSPITAL ENCOUNTER (INPATIENT)
Age: 78
LOS: 4 days | Discharge: NURSING, REHAB OR LONG-TERM CARE | DRG: 689 | End: 2023-10-02
Attending: EMERGENCY MEDICINE | Admitting: STUDENT IN AN ORGANIZED HEALTH CARE EDUCATION/TRAINING PROGRAM
Payer: MEDICARE

## 2023-09-27 DIAGNOSIS — M79.605 LEFT LEG PAIN: ICD-10-CM

## 2023-09-27 DIAGNOSIS — F33.2 SEVERE RECURRENT MAJOR DEPRESSION WITHOUT PSYCHOTIC FEATURES (HCC): ICD-10-CM

## 2023-09-27 DIAGNOSIS — D64.9 ANEMIA, UNSPECIFIED TYPE: ICD-10-CM

## 2023-09-27 DIAGNOSIS — N30.00 ACUTE CYSTITIS WITHOUT HEMATURIA: Primary | ICD-10-CM

## 2023-09-27 DIAGNOSIS — R41.0 CONFUSION: ICD-10-CM

## 2023-09-27 LAB
BASOPHILS # BLD: <0.03 K/UL (ref 0–0.2)
BASOPHILS NFR BLD: 0 % (ref 0–2)
EOSINOPHIL # BLD: 0.19 K/UL (ref 0–0.44)
EOSINOPHILS RELATIVE PERCENT: 3 % (ref 1–4)
ERYTHROCYTE [DISTWIDTH] IN BLOOD BY AUTOMATED COUNT: 15.7 % (ref 11.8–14.4)
GLUCOSE BLD-MCNC: 90 MG/DL (ref 65–105)
HCT VFR BLD AUTO: 33.1 % (ref 36.3–47.1)
HGB BLD-MCNC: 10.1 G/DL (ref 11.9–15.1)
IMM GRANULOCYTES # BLD AUTO: 0.05 K/UL (ref 0–0.3)
IMM GRANULOCYTES NFR BLD: 1 %
LYMPHOCYTES NFR BLD: 0.85 K/UL (ref 1.1–3.7)
LYMPHOCYTES RELATIVE PERCENT: 15 % (ref 24–43)
MCH RBC QN AUTO: 30.3 PG (ref 25.2–33.5)
MCHC RBC AUTO-ENTMCNC: 30.5 G/DL (ref 28.4–34.8)
MCV RBC AUTO: 99.4 FL (ref 82.6–102.9)
MONOCYTES NFR BLD: 1.02 K/UL (ref 0.1–1.2)
MONOCYTES NFR BLD: 18 % (ref 3–12)
NEUTROPHILS NFR BLD: 63 % (ref 36–65)
NEUTS SEG NFR BLD: 3.67 K/UL (ref 1.5–8.1)
NRBC BLD-RTO: 0 PER 100 WBC
PLATELET # BLD AUTO: 211 K/UL (ref 138–453)
PMV BLD AUTO: 10.4 FL (ref 8.1–13.5)
RBC # BLD AUTO: 3.33 M/UL (ref 3.95–5.11)
RBC # BLD: ABNORMAL 10*6/UL
WBC OTHER # BLD: 5.8 K/UL (ref 3.5–11.3)

## 2023-09-27 PROCEDURE — 93005 ELECTROCARDIOGRAM TRACING: CPT | Performed by: EMERGENCY MEDICINE

## 2023-09-27 PROCEDURE — 99285 EMERGENCY DEPT VISIT HI MDM: CPT

## 2023-09-27 PROCEDURE — 81001 URINALYSIS AUTO W/SCOPE: CPT

## 2023-09-27 PROCEDURE — 80053 COMPREHEN METABOLIC PANEL: CPT

## 2023-09-27 PROCEDURE — 6370000000 HC RX 637 (ALT 250 FOR IP): Performed by: EMERGENCY MEDICINE

## 2023-09-27 PROCEDURE — 85025 COMPLETE CBC W/AUTO DIFF WBC: CPT

## 2023-09-27 PROCEDURE — 83690 ASSAY OF LIPASE: CPT

## 2023-09-27 PROCEDURE — 82947 ASSAY GLUCOSE BLOOD QUANT: CPT

## 2023-09-27 RX ORDER — OXYCODONE HYDROCHLORIDE AND ACETAMINOPHEN 5; 325 MG/1; MG/1
1 TABLET ORAL ONCE
Status: COMPLETED | OUTPATIENT
Start: 2023-09-27 | End: 2023-09-27

## 2023-09-27 RX ADMIN — OXYCODONE AND ACETAMINOPHEN 1 TABLET: 5; 325 TABLET ORAL at 23:35

## 2023-09-27 ASSESSMENT — PAIN - FUNCTIONAL ASSESSMENT: PAIN_FUNCTIONAL_ASSESSMENT: 0-10

## 2023-09-27 ASSESSMENT — PAIN SCALES - GENERAL: PAINLEVEL_OUTOF10: 9

## 2023-09-28 ENCOUNTER — APPOINTMENT (OUTPATIENT)
Dept: CT IMAGING | Age: 78
DRG: 689 | End: 2023-09-28
Payer: MEDICARE

## 2023-09-28 ENCOUNTER — APPOINTMENT (OUTPATIENT)
Dept: VASCULAR LAB | Age: 78
DRG: 689 | End: 2023-09-28
Attending: STUDENT IN AN ORGANIZED HEALTH CARE EDUCATION/TRAINING PROGRAM
Payer: MEDICARE

## 2023-09-28 PROBLEM — G93.40 ENCEPHALOPATHY ACUTE: Status: ACTIVE | Noted: 2023-09-28

## 2023-09-28 PROBLEM — D64.9 ANEMIA: Status: ACTIVE | Noted: 2023-09-28

## 2023-09-28 PROBLEM — N39.0 UTI (URINARY TRACT INFECTION): Status: ACTIVE | Noted: 2023-09-28

## 2023-09-28 PROBLEM — K59.1 FUNCTIONAL DIARRHEA: Status: ACTIVE | Noted: 2023-09-28

## 2023-09-28 PROBLEM — M79.605 LEFT LEG PAIN: Status: ACTIVE | Noted: 2023-09-28

## 2023-09-28 LAB
ALBUMIN SERPL-MCNC: 3.5 G/DL (ref 3.5–5.2)
ALBUMIN/GLOB SERPL: 1.3 {RATIO} (ref 1–2.5)
ALP SERPL-CCNC: 139 U/L (ref 35–104)
ALT SERPL-CCNC: 16 U/L (ref 5–33)
AMMONIA PLAS-SCNC: 33 UMOL/L (ref 11–51)
ANION GAP SERPL CALCULATED.3IONS-SCNC: 12 MMOL/L (ref 9–17)
AST SERPL-CCNC: 13 U/L
BILIRUB SERPL-MCNC: 0.3 MG/DL (ref 0.3–1.2)
BILIRUB UR QL STRIP: ABNORMAL
BUN SERPL-MCNC: 16 MG/DL (ref 8–23)
C DIFF GDH + TOXINS A+B STL QL IA.RAPID: NEGATIVE
CALCIUM SERPL-MCNC: 8.6 MG/DL (ref 8.6–10.4)
CAMPYLOBACTER DNA SPEC NAA+PROBE: NORMAL
CASTS #/AREA URNS LPF: NORMAL /LPF (ref 0–8)
CHLORIDE SERPL-SCNC: 101 MMOL/L (ref 98–107)
CLARITY UR: CLEAR
CO2 SERPL-SCNC: 23 MMOL/L (ref 20–31)
COLOR UR: ABNORMAL
CREAT SERPL-MCNC: 1 MG/DL (ref 0.5–0.9)
DATE, STOOL #1: NORMAL
EPI CELLS #/AREA URNS HPF: NORMAL /HPF (ref 0–5)
ETEC ELTA+ESTB GENES STL QL NAA+PROBE: NORMAL
GFR SERPL CREATININE-BSD FRML MDRD: 58 ML/MIN/1.73M2
GLUCOSE SERPL-MCNC: 108 MG/DL (ref 70–99)
GLUCOSE UR STRIP-MCNC: NEGATIVE MG/DL
HEMOCCULT SP1 STL QL: NEGATIVE
HGB UR QL STRIP.AUTO: NEGATIVE
KETONES UR STRIP-MCNC: NEGATIVE MG/DL
LACTIC ACID, WHOLE BLOOD: 2 MMOL/L (ref 0.7–2.1)
LACTOFERRIN STL QL: ABNORMAL
LEUKOCYTE ESTERASE UR QL STRIP: ABNORMAL
LIPASE SERPL-CCNC: 12 U/L (ref 13–60)
NITRITE UR QL STRIP: POSITIVE
P SHIGELLOIDES DNA STL QL NAA+PROBE: NORMAL
PH UR STRIP: 5.5 [PH] (ref 5–8)
POTASSIUM SERPL-SCNC: 3.9 MMOL/L (ref 3.7–5.3)
PROT SERPL-MCNC: 6.3 G/DL (ref 6.4–8.3)
PROT UR STRIP-MCNC: ABNORMAL MG/DL
RBC #/AREA URNS HPF: NORMAL /HPF (ref 0–4)
SALMONELLA DNA SPEC QL NAA+PROBE: NORMAL
SHIGA TOXIN STX GENE SPEC NAA+PROBE: NORMAL
SHIGELLA DNA SPEC QL NAA+PROBE: NORMAL
SODIUM SERPL-SCNC: 136 MMOL/L (ref 135–144)
SP GR UR STRIP: 1.02 (ref 1–1.03)
SPECIMEN DESCRIPTION: NORMAL
SPECIMEN DESCRIPTION: NORMAL
TIME, STOOL #1: 200
UROBILINOGEN UR STRIP-ACNC: NORMAL EU/DL (ref 0–1)
V CHOL+PARA RFBL+TRKH+TNAA STL QL NAA+PR: NORMAL
WBC #/AREA URNS HPF: NORMAL /HPF (ref 0–5)
Y ENTERO RECN STL QL NAA+PROBE: NORMAL

## 2023-09-28 PROCEDURE — 6370000000 HC RX 637 (ALT 250 FOR IP): Performed by: FAMILY MEDICINE

## 2023-09-28 PROCEDURE — 6360000002 HC RX W HCPCS: Performed by: INTERNAL MEDICINE

## 2023-09-28 PROCEDURE — 2700000000 HC OXYGEN THERAPY PER DAY

## 2023-09-28 PROCEDURE — 94761 N-INVAS EAR/PLS OXIMETRY MLT: CPT

## 2023-09-28 PROCEDURE — 6370000000 HC RX 637 (ALT 250 FOR IP): Performed by: NURSE PRACTITIONER

## 2023-09-28 PROCEDURE — 6360000002 HC RX W HCPCS: Performed by: NURSE PRACTITIONER

## 2023-09-28 PROCEDURE — 2580000003 HC RX 258: Performed by: EMERGENCY MEDICINE

## 2023-09-28 PROCEDURE — 93971 EXTREMITY STUDY: CPT

## 2023-09-28 PROCEDURE — 6360000002 HC RX W HCPCS

## 2023-09-28 PROCEDURE — 6360000002 HC RX W HCPCS: Performed by: FAMILY MEDICINE

## 2023-09-28 PROCEDURE — 2580000003 HC RX 258: Performed by: NURSE PRACTITIONER

## 2023-09-28 PROCEDURE — 99222 1ST HOSP IP/OBS MODERATE 55: CPT | Performed by: STUDENT IN AN ORGANIZED HEALTH CARE EDUCATION/TRAINING PROGRAM

## 2023-09-28 PROCEDURE — 82140 ASSAY OF AMMONIA: CPT

## 2023-09-28 PROCEDURE — 74176 CT ABD & PELVIS W/O CONTRAST: CPT

## 2023-09-28 PROCEDURE — A4216 STERILE WATER/SALINE, 10 ML: HCPCS | Performed by: FAMILY MEDICINE

## 2023-09-28 PROCEDURE — 2580000003 HC RX 258: Performed by: INTERNAL MEDICINE

## 2023-09-28 PROCEDURE — 70450 CT HEAD/BRAIN W/O DYE: CPT

## 2023-09-28 PROCEDURE — 83630 LACTOFERRIN FECAL (QUAL): CPT

## 2023-09-28 PROCEDURE — 87506 IADNA-DNA/RNA PROBE TQ 6-11: CPT

## 2023-09-28 PROCEDURE — 93971 EXTREMITY STUDY: CPT | Performed by: SURGERY

## 2023-09-28 PROCEDURE — 1200000000 HC SEMI PRIVATE

## 2023-09-28 PROCEDURE — 6360000002 HC RX W HCPCS: Performed by: EMERGENCY MEDICINE

## 2023-09-28 PROCEDURE — 2580000003 HC RX 258: Performed by: FAMILY MEDICINE

## 2023-09-28 PROCEDURE — 87040 BLOOD CULTURE FOR BACTERIA: CPT

## 2023-09-28 PROCEDURE — 87449 NOS EACH ORGANISM AG IA: CPT

## 2023-09-28 PROCEDURE — 99222 1ST HOSP IP/OBS MODERATE 55: CPT | Performed by: INTERNAL MEDICINE

## 2023-09-28 PROCEDURE — 83605 ASSAY OF LACTIC ACID: CPT

## 2023-09-28 PROCEDURE — C9113 INJ PANTOPRAZOLE SODIUM, VIA: HCPCS | Performed by: FAMILY MEDICINE

## 2023-09-28 PROCEDURE — 87324 CLOSTRIDIUM AG IA: CPT

## 2023-09-28 PROCEDURE — 82272 OCCULT BLD FECES 1-3 TESTS: CPT

## 2023-09-28 RX ORDER — ENOXAPARIN SODIUM 100 MG/ML
40 INJECTION SUBCUTANEOUS DAILY
Status: DISCONTINUED | OUTPATIENT
Start: 2023-09-28 | End: 2023-10-02 | Stop reason: HOSPADM

## 2023-09-28 RX ORDER — LACTOBACILLUS RHAMNOSUS GG 10B CELL
1 CAPSULE ORAL
Status: DISCONTINUED | OUTPATIENT
Start: 2023-09-28 | End: 2023-10-02 | Stop reason: HOSPADM

## 2023-09-28 RX ORDER — ESCITALOPRAM OXALATE 10 MG/1
20 TABLET ORAL DAILY
Status: DISCONTINUED | OUTPATIENT
Start: 2023-09-28 | End: 2023-10-02 | Stop reason: HOSPADM

## 2023-09-28 RX ORDER — METOPROLOL SUCCINATE 25 MG/1
25 TABLET, EXTENDED RELEASE ORAL DAILY
Status: DISCONTINUED | OUTPATIENT
Start: 2023-09-28 | End: 2023-10-02 | Stop reason: HOSPADM

## 2023-09-28 RX ORDER — CIPROFLOXACIN 2 MG/ML
400 INJECTION, SOLUTION INTRAVENOUS EVERY 12 HOURS
Status: DISCONTINUED | OUTPATIENT
Start: 2023-09-28 | End: 2023-09-28

## 2023-09-28 RX ORDER — SODIUM CHLORIDE 9 MG/ML
INJECTION, SOLUTION INTRAVENOUS PRN
Status: DISCONTINUED | OUTPATIENT
Start: 2023-09-28 | End: 2023-10-02 | Stop reason: HOSPADM

## 2023-09-28 RX ORDER — ATORVASTATIN CALCIUM 40 MG/1
40 TABLET, FILM COATED ORAL NIGHTLY
Status: DISCONTINUED | OUTPATIENT
Start: 2023-09-28 | End: 2023-10-02 | Stop reason: HOSPADM

## 2023-09-28 RX ORDER — ONDANSETRON 2 MG/ML
4 INJECTION INTRAMUSCULAR; INTRAVENOUS EVERY 6 HOURS PRN
Status: DISCONTINUED | OUTPATIENT
Start: 2023-09-28 | End: 2023-10-02 | Stop reason: HOSPADM

## 2023-09-28 RX ORDER — METOCLOPRAMIDE HYDROCHLORIDE 5 MG/ML
10 INJECTION INTRAMUSCULAR; INTRAVENOUS EVERY 6 HOURS
Status: DISCONTINUED | OUTPATIENT
Start: 2023-09-28 | End: 2023-10-02 | Stop reason: HOSPADM

## 2023-09-28 RX ORDER — CLONAZEPAM 0.5 MG/1
0.5 TABLET ORAL 3 TIMES DAILY PRN
Status: DISCONTINUED | OUTPATIENT
Start: 2023-09-28 | End: 2023-10-02 | Stop reason: HOSPADM

## 2023-09-28 RX ORDER — ACETAMINOPHEN 325 MG/1
650 TABLET ORAL EVERY 6 HOURS PRN
Status: DISCONTINUED | OUTPATIENT
Start: 2023-09-28 | End: 2023-10-02 | Stop reason: HOSPADM

## 2023-09-28 RX ORDER — TRAZODONE HYDROCHLORIDE 100 MG/1
100 TABLET ORAL NIGHTLY
Status: DISCONTINUED | OUTPATIENT
Start: 2023-09-28 | End: 2023-10-02 | Stop reason: HOSPADM

## 2023-09-28 RX ORDER — METOCLOPRAMIDE HYDROCHLORIDE 5 MG/ML
10 INJECTION INTRAMUSCULAR; INTRAVENOUS EVERY 6 HOURS PRN
Status: DISCONTINUED | OUTPATIENT
Start: 2023-09-28 | End: 2023-09-28

## 2023-09-28 RX ORDER — ACETAMINOPHEN 650 MG/1
650 SUPPOSITORY RECTAL EVERY 6 HOURS PRN
Status: DISCONTINUED | OUTPATIENT
Start: 2023-09-28 | End: 2023-10-02 | Stop reason: HOSPADM

## 2023-09-28 RX ORDER — QUETIAPINE FUMARATE 100 MG/1
100 TABLET, FILM COATED ORAL NIGHTLY
Status: DISCONTINUED | OUTPATIENT
Start: 2023-09-28 | End: 2023-10-02 | Stop reason: HOSPADM

## 2023-09-28 RX ORDER — SODIUM CHLORIDE, SODIUM LACTATE, POTASSIUM CHLORIDE, CALCIUM CHLORIDE 600; 310; 30; 20 MG/100ML; MG/100ML; MG/100ML; MG/100ML
INJECTION, SOLUTION INTRAVENOUS CONTINUOUS
Status: DISCONTINUED | OUTPATIENT
Start: 2023-09-28 | End: 2023-10-02 | Stop reason: HOSPADM

## 2023-09-28 RX ORDER — LANOLIN ALCOHOL/MO/W.PET/CERES
325 CREAM (GRAM) TOPICAL
Status: DISCONTINUED | OUTPATIENT
Start: 2023-09-28 | End: 2023-10-02 | Stop reason: HOSPADM

## 2023-09-28 RX ORDER — BUSPIRONE HYDROCHLORIDE 10 MG/1
10 TABLET ORAL 2 TIMES DAILY
Status: DISCONTINUED | OUTPATIENT
Start: 2023-09-28 | End: 2023-10-02 | Stop reason: HOSPADM

## 2023-09-28 RX ORDER — METOCLOPRAMIDE HYDROCHLORIDE 5 MG/ML
10 INJECTION INTRAMUSCULAR; INTRAVENOUS EVERY 6 HOURS
Status: DISCONTINUED | OUTPATIENT
Start: 2023-09-28 | End: 2023-09-28

## 2023-09-28 RX ORDER — METRONIDAZOLE 500 MG/100ML
500 INJECTION, SOLUTION INTRAVENOUS EVERY 8 HOURS
Status: DISCONTINUED | OUTPATIENT
Start: 2023-09-28 | End: 2023-09-28

## 2023-09-28 RX ORDER — ONDANSETRON 4 MG/1
4 TABLET, ORALLY DISINTEGRATING ORAL EVERY 8 HOURS PRN
Status: DISCONTINUED | OUTPATIENT
Start: 2023-09-28 | End: 2023-10-02 | Stop reason: HOSPADM

## 2023-09-28 RX ORDER — GABAPENTIN 300 MG/1
300 CAPSULE ORAL 3 TIMES DAILY
Status: DISCONTINUED | OUTPATIENT
Start: 2023-09-28 | End: 2023-10-02 | Stop reason: HOSPADM

## 2023-09-28 RX ORDER — SODIUM CHLORIDE 0.9 % (FLUSH) 0.9 %
5-40 SYRINGE (ML) INJECTION PRN
Status: DISCONTINUED | OUTPATIENT
Start: 2023-09-28 | End: 2023-10-02 | Stop reason: HOSPADM

## 2023-09-28 RX ORDER — LORAZEPAM 2 MG/ML
INJECTION INTRAMUSCULAR
Status: COMPLETED
Start: 2023-09-28 | End: 2023-09-28

## 2023-09-28 RX ORDER — LORAZEPAM 2 MG/ML
0.5 INJECTION INTRAMUSCULAR EVERY 12 HOURS PRN
Status: DISCONTINUED | OUTPATIENT
Start: 2023-09-28 | End: 2023-10-02 | Stop reason: HOSPADM

## 2023-09-28 RX ORDER — MAGNESIUM HYDROXIDE/ALUMINUM HYDROXICE/SIMETHICONE 120; 1200; 1200 MG/30ML; MG/30ML; MG/30ML
5 SUSPENSION ORAL PRN
Status: DISCONTINUED | OUTPATIENT
Start: 2023-09-28 | End: 2023-10-02 | Stop reason: HOSPADM

## 2023-09-28 RX ORDER — SODIUM CHLORIDE 0.9 % (FLUSH) 0.9 %
5-40 SYRINGE (ML) INJECTION EVERY 12 HOURS SCHEDULED
Status: DISCONTINUED | OUTPATIENT
Start: 2023-09-28 | End: 2023-10-02 | Stop reason: HOSPADM

## 2023-09-28 RX ORDER — QUETIAPINE FUMARATE 25 MG/1
50 TABLET, FILM COATED ORAL DAILY
Status: DISCONTINUED | OUTPATIENT
Start: 2023-09-28 | End: 2023-10-02 | Stop reason: HOSPADM

## 2023-09-28 RX ORDER — POLYETHYLENE GLYCOL 3350 17 G/17G
17 POWDER, FOR SOLUTION ORAL DAILY PRN
Status: DISCONTINUED | OUTPATIENT
Start: 2023-09-28 | End: 2023-10-02 | Stop reason: HOSPADM

## 2023-09-28 RX ADMIN — Medication 1 CAPSULE: at 11:27

## 2023-09-28 RX ADMIN — SODIUM CHLORIDE, PRESERVATIVE FREE 5 ML: 5 INJECTION INTRAVENOUS at 07:23

## 2023-09-28 RX ADMIN — BUSPIRONE HYDROCHLORIDE 10 MG: 10 TABLET ORAL at 11:25

## 2023-09-28 RX ADMIN — CLONAZEPAM 0.5 MG: 0.5 TABLET ORAL at 22:12

## 2023-09-28 RX ADMIN — QUETIAPINE FUMARATE 100 MG: 100 TABLET ORAL at 20:27

## 2023-09-28 RX ADMIN — FERROUS SULFATE TAB EC 325 MG (65 MG FE EQUIVALENT) 325 MG: 325 (65 FE) TABLET DELAYED RESPONSE at 11:24

## 2023-09-28 RX ADMIN — ONDANSETRON 4 MG: 2 INJECTION INTRAMUSCULAR; INTRAVENOUS at 03:41

## 2023-09-28 RX ADMIN — SODIUM CHLORIDE, PRESERVATIVE FREE 10 ML: 5 INJECTION INTRAVENOUS at 20:26

## 2023-09-28 RX ADMIN — CIPROFLOXACIN 400 MG: 2 INJECTION, SOLUTION INTRAVENOUS at 11:24

## 2023-09-28 RX ADMIN — DESMOPRESSIN ACETATE 40 MG: 0.2 TABLET ORAL at 20:27

## 2023-09-28 RX ADMIN — LORAZEPAM 0.5 MG: 2 INJECTION INTRAMUSCULAR; INTRAVENOUS at 10:06

## 2023-09-28 RX ADMIN — METOCLOPRAMIDE 10 MG: 5 INJECTION, SOLUTION INTRAMUSCULAR; INTRAVENOUS at 11:23

## 2023-09-28 RX ADMIN — BUSPIRONE HYDROCHLORIDE 10 MG: 10 TABLET ORAL at 20:27

## 2023-09-28 RX ADMIN — METOPROLOL SUCCINATE 25 MG: 25 TABLET, EXTENDED RELEASE ORAL at 11:24

## 2023-09-28 RX ADMIN — METOCLOPRAMIDE 10 MG: 5 INJECTION, SOLUTION INTRAMUSCULAR; INTRAVENOUS at 22:02

## 2023-09-28 RX ADMIN — SODIUM CHLORIDE, PRESERVATIVE FREE 40 MG: 5 INJECTION INTRAVENOUS at 10:12

## 2023-09-28 RX ADMIN — ONDANSETRON 4 MG: 2 INJECTION INTRAMUSCULAR; INTRAVENOUS at 16:19

## 2023-09-28 RX ADMIN — ESCITALOPRAM 20 MG: 10 TABLET, FILM COATED ORAL at 11:22

## 2023-09-28 RX ADMIN — TRAZODONE HYDROCHLORIDE 100 MG: 100 TABLET ORAL at 22:00

## 2023-09-28 RX ADMIN — MEROPENEM 1000 MG: 1 INJECTION, POWDER, FOR SOLUTION INTRAVENOUS at 01:43

## 2023-09-28 RX ADMIN — ONDANSETRON 4 MG: 2 INJECTION INTRAMUSCULAR; INTRAVENOUS at 08:35

## 2023-09-28 RX ADMIN — VANCOMYCIN HYDROCHLORIDE 1250 MG: 1.25 INJECTION, POWDER, LYOPHILIZED, FOR SOLUTION INTRAVENOUS at 02:40

## 2023-09-28 RX ADMIN — GABAPENTIN 300 MG: 300 CAPSULE ORAL at 14:58

## 2023-09-28 RX ADMIN — SODIUM CHLORIDE, POTASSIUM CHLORIDE, SODIUM LACTATE AND CALCIUM CHLORIDE: 600; 310; 30; 20 INJECTION, SOLUTION INTRAVENOUS at 05:02

## 2023-09-28 RX ADMIN — MEROPENEM 1000 MG: 1 INJECTION, POWDER, FOR SOLUTION INTRAVENOUS at 14:50

## 2023-09-28 RX ADMIN — GABAPENTIN 300 MG: 300 CAPSULE ORAL at 20:30

## 2023-09-28 ASSESSMENT — PAIN SCALES - GENERAL: PAINLEVEL_OUTOF10: 2

## 2023-09-28 ASSESSMENT — ENCOUNTER SYMPTOMS
NAUSEA: 1
ABDOMINAL PAIN: 1
SHORTNESS OF BREATH: 0
NAUSEA: 0
VOMITING: 1
VOMITING: 0
COUGH: 0
CONSTIPATION: 0
BACK PAIN: 0
EYE DISCHARGE: 0
BLOOD IN STOOL: 0
CONSTIPATION: 1
COLOR CHANGE: 0
DIARRHEA: 1
EYE PAIN: 0
EYE REDNESS: 0

## 2023-09-28 ASSESSMENT — PAIN DESCRIPTION - LOCATION: LOCATION: LEG

## 2023-09-28 ASSESSMENT — PAIN DESCRIPTION - ORIENTATION: ORIENTATION: LEFT

## 2023-09-28 NOTE — ED NOTES
The following labs were labeled with appropriate pt sticker and tubed to lab:     [] Blue     [x] Lavender   [x] on ice  [] Green/yellow  [] Green/black [] on ice  [] Dewitte Carissa  [] on ice  [] Yellow  [] Red  [] Pink  [] Type/ Screen  [] ABG  [] VBG    [] COVID-19 swab    [] Rapid  [] PCR  [] Flu swab  [] Peds Viral Panel     [] Urine Sample  [] Fecal Sample  [] Pelvic Cultures  [] Blood Cultures  [] X 2  [] STREP Cultures       Cony Sorenson, RN  09/28/23 [facility] [facility] Efraín Sheikh, JUAREZ  09/28/23 2003

## 2023-09-28 NOTE — ED NOTES
Pt's complains of abdominal pain and bowel movement. Changed brief and linens. Transferred to room 8.        [address] RN  09/28/23 9339

## 2023-09-28 NOTE — CONSULTS
Psychophysiological insomnia     Recurrent UTI     Dr. Jaziel Jones    S/P PICC central line placement 08/05/2022    \"no longer present\" per Petra Anders (Nurse) at [facility] of [address] SBO (small bowel obstruction) ([address]) 7/22/2023    Sepsis ([address])     Sinusitis     Tracheostomy in place [facility])     \"No longer present\" per Petra Anders (Nurse) at [facility] of [address]     Ulcerative esophagitis     Urinary tract infection, site not specified     Uses wheelchair     Wears glasses     Wellness examination     Dr. Patience Viveros seen in Jan 2020     Past Surgical History:   Procedure Laterality Date    APPENDECTOMY      [address]PAL [address], LAPAROSCOPIC N/A 05/22/2020    XI LAPAROSCOPIC ROBOTIC CHOLECYSTECTOMY, PYLOROPLASTY performed by Diana Anaya MD at [facility]      COLONOSCOPY N/A 1/28/2022    COLONOSCOPY POLYPECTOMY HOT performed by Joan Mott MD at [address]      ERCP  05/21/2020    with stent insertion, balloon dilation, sphinctereotomy    ERCP  05/21/2020    ** CASE IN OR WITY GI STAFF** ERCP STENT INSERTION performed by Joan Mott MD at [facility]    ERCP  05/21/2020    ** CASE IN OR WITH GI STAFF**ERCP SPHINCTER/PAPILLOTOMY performed by Joan Mott MD at [facility]    ERCP  05/21/2020    ** CASE IN OR WITH GI STAFF**ERCP DILATION BALLOON performed by Joan Mott MD at [facility]    ERCP N/A 2/8/2021    ERCP STENT REMOVAL performed by Anjali Mcdermott MD at [facility]    ERCP  2/8/2021    ERCP STONE REMOVAL performed by Anjali Mcdermott MD at [address]    GASTRIC FUNDOPLICATION N/A 77/55/9897    XI LAPAROSCOPIC ROBOTIC [address], CHERYL FUNDOPLICATION performed by Diana Anaya MD at [address] [place] [address] 03/27/2020    EGD PEG TUBE PLACEMENT performed by Diana Anaya MD at [address] [place] [address] 2/8/2021    **CASE IN O.R. W/ GI STAFF - EGD WITH REMOVAL PEG
seen and examined the patient and the key elements of all parts of the encounter have been performed by me. I agree with the assessment, plan and orders as documented by the resident.     Philly Mcneil, Infectious Diseases

## 2023-09-28 NOTE — ED NOTES
ED to inpatient nurses report      Chief Complaint:  Chief Complaint   Patient presents with    Abdominal Pain    Leg Pain     Left side     Present to ED from: Pt 69 yo female, arrives to ED via stretcher per [facility]. EMS reports pt had AMS from SNF. Pt states she has abdominal pain and had diarrhea for 1 week. Pt is alert and oriented x 4. Attached to full cardiac monitor. MOA:     LOC: alert and orientated to name, place, date  Mobility: Requires assistance * 2  Oxygen Baseline: 94%    Current needs required: oxygen via NC at 3LPM   Pending ED orders: none  Present condition: stable    Why did the patient come to the ED? Pt 69 yo female, arrives to ED via stretcher per [facility]. EMS reports pt had AMS from SNF. Pt states she has abdominal pain and had diarrhea for 1 week. Pt is alert and oriented x 4. Attached to full cardiac monitor. What is the plan? Antibiotic tx, fluid replacement, observation  Any procedures or intervention occur?  Labs, CT, antibiotic medications    Mental Status:  Level of Consciousness: Alert (0)    Psych Assessment:   Psychosocial  Psychosocial (WDL): Within Defined Limits  Vital signs   Vitals:    09/28/23 0431 09/28/23 0444 09/28/23 0515 09/28/23 0521   BP: (!) 90/54 (!) 102/50  (!) 110/53   Pulse: (!) 107 (!) 103 97 95   Resp: 16 14 23   Temp:       TempSrc:       SpO2: 95% 96%  94%   Weight:            Vitals:  Patient Vitals for the past 24 hrs:   BP Temp Temp src Pulse Resp SpO2 Weight   09/28/23 0521 (!) 110/53 -- -- 95 23 94 % --   09/28/23 0515 -- -- -- 97 -- -- --   09/28/23 0444 (!) 102/50 -- -- (!) 103 14 96 % --   09/28/23 0431 (!) 90/54 -- -- (!) 107 16 95 % --   09/28/23 0334 91/70 -- -- (!) 106 15 92 % --   09/28/23 0145 105/65 -- -- 76 17 94 % --   09/28/23 0100 97/68 -- -- 76 16 93 % 194 lb 7.1 oz (88.2 kg)   09/28/23 0045 94/67 -- -- 76 17 95 % --   09/28/23 0035 -- -- -- 77 17 93 % --   09/28/23 0034 -- -- -- 78 18 93 % --   09/28/23 0033 (!) 103/57 -- -- 77 17

## 2023-09-28 NOTE — ED TRIAGE NOTES
Pt 67 yo female, arrives to ED via stretcher per [facility]. EMS reports pt had AMS from SNF. Pt states she has abdominal pain and had diarrhea for 1 week. Pt is alert and oriented x 4. Attached to full cardiac monitor.

## 2023-09-28 NOTE — ED NOTES
Pt having emesis episodes; on-call NP Prateek notified. Pt also provided perineal care, brief changed.       Micheal Adam RN  09/28/23 2191

## 2023-09-28 NOTE — ED NOTES
The following labs were labeled with appropriate pt sticker and tubed to lab:     [] Blue     [] Lavender   [] on ice  [] Green/yellow  [] Green/black [] on ice  [] Lizzy Hillock  [] on ice  [] Yellow  [] Red  [] Pink  [] Type/ Screen  [] ABG  [] VBG    [] COVID-19 swab    [] Rapid  [] PCR  [] Flu swab  [] Peds Viral Panel     [] Urine Sample  [x] Fecal Sample  [] Pelvic Cultures  [] Blood Cultures  [] X 2  [] STREP Cultures       [facility] Torrey Phillip RN  09/28/23 0436

## 2023-09-28 NOTE — ED NOTES
Pt was put on purewick, connected by a tube to a canister with continuous suction.         [address] Gen[address] Montgomery RN  09/28/23 0396

## 2023-09-28 NOTE — ED PROVIDER NOTES
[address] [place] [address] ED  Emergency Department Encounter  Emergency Medicine Resident     Pt Name:Erin Agustin Floor  MRN: [id number]  [address] [facility] 1945  Date of evaluation: 9/27/23  PCP:  No primary care provider on file. Note Started: 10:20 PM EDT      CHIEF COMPLAINT       Chief Complaint   Patient presents with    Abdominal Pain    Leg Pain     Left side       HISTORY OF PRESENT ILLNESS  (Location/Symptom, Timing/Onset, Context/Setting, Quality, Duration, Modifying Factors, Severity.)      Betty Boles is a 68 y.o. female who presents with intermittent confusion and abdominal pain. Patient states that her nursing facility sent her in secondary to this confusion. She states that she has felt confused herself. She states she has difficulty finding words at times. She states that she has a history of urinary tract infections and was just treated for UTI 2 weeks ago. She states that she finished her course of antibiotics. She complains of dysuria and hematuria. Denies blood in her stool. States she has had some diarrhea recently. She denies chest pain, shortness of breath, headaches, dizziness, lightheadedness, nausea, vomiting. Patient is also complaining of leg pain today. States that her leg pain started when she went back to the nursing facility has been working with physical therapy. Per chart review patient was recently discharged from [facility] where she was admitted for chest pain and found to have a UTI.     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
arriving at the hospital.     Exam:  General : Laying on the bed, awake, alert, and in no acute distress  CV : normal rate and regular rhythm  Lungs : Breathing comfortably on room air with no tachypnea, hypoxia, or increased work of breathing  Neuro: awake, alert, oriented. Moving all extremities. Fluent speech, no dysarthria or aphasia. DDx:carotid stenosis, infection, watershed infarcts, TIA/CVA,     Plan:  Metabolic workup including electrolytes, CBC, UA  Admission for further management. Medical Decision Making  Amount and/or Complexity of Data Reviewed  Labs: ordered. Decision-making details documented in ED Course. ECG/medicine tests: ordered. Risk  Prescription drug management. EKG: Interpreted by myself: Normal sinus rhythm at 78 bpm. Normal axis, normal intervals. No ST segment changes. No TWI, global flattening.  Nonspecific EKG      Donna Kyle MD   Attending Emergency Physician    (Please note that portions of this note were completed with a voice recognition program. Efforts were made to edit the dictations but occasionally words are mis-transcribed.)            Donna Kyle MD  09/27/23 3988

## 2023-09-28 NOTE — ED NOTES
Called lab and asked to add on the additional tests for stool, and they agreed but they states to inform once there's an issue with the stool sample sent.      Humza Worrell RN  09/28/23 7153

## 2023-09-28 NOTE — ED NOTES
Report given to [facility]; all questions addressed.       Indy Leroy RN  09/28/23 2863

## 2023-09-28 NOTE — ED NOTES
Pt vomited and had watery loose stool greenish in color, around 800 ml (adult soaked diaper). Pt felt a slight relief after vomiting but still in pain on the whole abdominal area which the pt described as continuous \"horrible\" pain, score of 8/10.   Notified Dr. Wendi Suh, JOSEPH Lambert, JUAREZ  09/28/23 1609

## 2023-09-28 NOTE — ED NOTES
The following labs were labeled with appropriate pt sticker and tubed to lab:     [] Blue     [] Lavender   [] on ice  [] Green/yellow  [x] Green/black [x] on ice  [] Sherrel Peel  [] on ice  [] Yellow  [] Red  [] Pink  [] Type/ Screen  [] ABG  [] VBG    [] COVID-19 swab    [] Rapid  [] PCR  [] Flu swab  [] Peds Viral Panel     [] Urine Sample  [] Fecal Sample  [] Pelvic Cultures  [x] Blood Cultures  [x] X 2  [] STREP Cultures       [facility] Oumou García RN  09/28/23 0902

## 2023-09-29 LAB
ANION GAP SERPL CALCULATED.3IONS-SCNC: 12 MMOL/L (ref 9–17)
BASOPHILS # BLD: <0.03 K/UL (ref 0–0.2)
BASOPHILS NFR BLD: 0 % (ref 0–2)
BUN SERPL-MCNC: 7 MG/DL (ref 8–23)
CALCIUM SERPL-MCNC: 8.3 MG/DL (ref 8.6–10.4)
CHLORIDE SERPL-SCNC: 105 MMOL/L (ref 98–107)
CO2 SERPL-SCNC: 21 MMOL/L (ref 20–31)
CREAT SERPL-MCNC: 0.8 MG/DL (ref 0.5–0.9)
CRP SERPL HS-MCNC: 65.5 MG/L (ref 0–5)
EKG ATRIAL RATE: 78 BPM
EKG P AXIS: 42 DEGREES
EKG P-R INTERVAL: 168 MS
EKG Q-T INTERVAL: 414 MS
EKG QRS DURATION: 82 MS
EKG QTC CALCULATION (BAZETT): 471 MS
EKG R AXIS: 21 DEGREES
EKG T AXIS: 22 DEGREES
EKG VENTRICULAR RATE: 78 BPM
EOSINOPHIL # BLD: 0.32 K/UL (ref 0–0.44)
EOSINOPHILS RELATIVE PERCENT: 5 % (ref 1–4)
ERYTHROCYTE [DISTWIDTH] IN BLOOD BY AUTOMATED COUNT: 15.5 % (ref 11.8–14.4)
FERRITIN SERPL-MCNC: 325 NG/ML (ref 13–150)
FOLATE SERPL-MCNC: >20 NG/ML
GFR SERPL CREATININE-BSD FRML MDRD: >60 ML/MIN/1.73M2
GLUCOSE SERPL-MCNC: 106 MG/DL (ref 70–99)
HCT VFR BLD AUTO: 35.6 % (ref 36.3–47.1)
HGB BLD-MCNC: 10.3 G/DL (ref 11.9–15.1)
IMM GRANULOCYTES # BLD AUTO: 0.05 K/UL (ref 0–0.3)
IMM GRANULOCYTES NFR BLD: 1 %
INR PPP: 1.2
IRON SATN MFR SERPL: 39 % (ref 20–55)
IRON SERPL-MCNC: 78 UG/DL (ref 37–145)
LYMPHOCYTES NFR BLD: 0.95 K/UL (ref 1.1–3.7)
LYMPHOCYTES RELATIVE PERCENT: 16 % (ref 24–43)
MCH RBC QN AUTO: 30 PG (ref 25.2–33.5)
MCHC RBC AUTO-ENTMCNC: 28.9 G/DL (ref 28.4–34.8)
MCV RBC AUTO: 103.8 FL (ref 82.6–102.9)
MONOCYTES NFR BLD: 0.85 K/UL (ref 0.1–1.2)
MONOCYTES NFR BLD: 14 % (ref 3–12)
NEUTROPHILS NFR BLD: 64 % (ref 36–65)
NEUTS SEG NFR BLD: 3.79 K/UL (ref 1.5–8.1)
NRBC BLD-RTO: 0 PER 100 WBC
PLATELET # BLD AUTO: 181 K/UL (ref 138–453)
PMV BLD AUTO: 10.5 FL (ref 8.1–13.5)
POTASSIUM SERPL-SCNC: 3.6 MMOL/L (ref 3.7–5.3)
PROTHROMBIN TIME: 15 SEC (ref 11.7–14.9)
RBC # BLD AUTO: 3.43 M/UL (ref 3.95–5.11)
RBC # BLD: ABNORMAL 10*6/UL
RBC # BLD: ABNORMAL 10*6/UL
SODIUM SERPL-SCNC: 138 MMOL/L (ref 135–144)
TIBC SERPL-MCNC: 201 UG/DL (ref 250–450)
UNSATURATED IRON BINDING CAPACITY: 123 UG/DL (ref 112–347)
VIT B12 SERPL-MCNC: 868 PG/ML (ref 232–1245)
WBC OTHER # BLD: 6 K/UL (ref 3.5–11.3)

## 2023-09-29 PROCEDURE — 2580000003 HC RX 258: Performed by: INTERNAL MEDICINE

## 2023-09-29 PROCEDURE — C9113 INJ PANTOPRAZOLE SODIUM, VIA: HCPCS | Performed by: FAMILY MEDICINE

## 2023-09-29 PROCEDURE — 6370000000 HC RX 637 (ALT 250 FOR IP): Performed by: STUDENT IN AN ORGANIZED HEALTH CARE EDUCATION/TRAINING PROGRAM

## 2023-09-29 PROCEDURE — 93010 ELECTROCARDIOGRAM REPORT: CPT | Performed by: INTERNAL MEDICINE

## 2023-09-29 PROCEDURE — 87086 URINE CULTURE/COLONY COUNT: CPT

## 2023-09-29 PROCEDURE — 2580000003 HC RX 258: Performed by: NURSE PRACTITIONER

## 2023-09-29 PROCEDURE — 6360000002 HC RX W HCPCS: Performed by: INTERNAL MEDICINE

## 2023-09-29 PROCEDURE — 85610 PROTHROMBIN TIME: CPT

## 2023-09-29 PROCEDURE — 80048 BASIC METABOLIC PNL TOTAL CA: CPT

## 2023-09-29 PROCEDURE — 82746 ASSAY OF FOLIC ACID SERUM: CPT

## 2023-09-29 PROCEDURE — 85025 COMPLETE CBC W/AUTO DIFF WBC: CPT

## 2023-09-29 PROCEDURE — 83540 ASSAY OF IRON: CPT

## 2023-09-29 PROCEDURE — 6370000000 HC RX 637 (ALT 250 FOR IP): Performed by: FAMILY MEDICINE

## 2023-09-29 PROCEDURE — 82728 ASSAY OF FERRITIN: CPT

## 2023-09-29 PROCEDURE — 82607 VITAMIN B-12: CPT

## 2023-09-29 PROCEDURE — 83550 IRON BINDING TEST: CPT

## 2023-09-29 PROCEDURE — 2580000003 HC RX 258: Performed by: FAMILY MEDICINE

## 2023-09-29 PROCEDURE — 6370000000 HC RX 637 (ALT 250 FOR IP): Performed by: NURSE PRACTITIONER

## 2023-09-29 PROCEDURE — 99232 SBSQ HOSP IP/OBS MODERATE 35: CPT | Performed by: STUDENT IN AN ORGANIZED HEALTH CARE EDUCATION/TRAINING PROGRAM

## 2023-09-29 PROCEDURE — 1200000000 HC SEMI PRIVATE

## 2023-09-29 PROCEDURE — 6360000002 HC RX W HCPCS: Performed by: FAMILY MEDICINE

## 2023-09-29 PROCEDURE — 51701 INSERT BLADDER CATHETER: CPT

## 2023-09-29 PROCEDURE — 36415 COLL VENOUS BLD VENIPUNCTURE: CPT

## 2023-09-29 PROCEDURE — 86140 C-REACTIVE PROTEIN: CPT

## 2023-09-29 PROCEDURE — APPSS30 APP SPLIT SHARED TIME 16-30 MINUTES: Performed by: NURSE PRACTITIONER

## 2023-09-29 PROCEDURE — 99232 SBSQ HOSP IP/OBS MODERATE 35: CPT | Performed by: INTERNAL MEDICINE

## 2023-09-29 RX ORDER — OXYCODONE HYDROCHLORIDE AND ACETAMINOPHEN 5; 325 MG/1; MG/1
1 TABLET ORAL 3 TIMES DAILY PRN
Status: ON HOLD | COMMUNITY
End: 2023-10-02 | Stop reason: SDUPTHER

## 2023-09-29 RX ORDER — PHENAZOPYRIDINE HYDROCHLORIDE 100 MG/1
100 TABLET, FILM COATED ORAL 2 TIMES DAILY
Status: ON HOLD | COMMUNITY
End: 2023-10-02 | Stop reason: HOSPADM

## 2023-09-29 RX ORDER — AMITRIPTYLINE HYDROCHLORIDE 25 MG/1
25 TABLET, FILM COATED ORAL 3 TIMES DAILY
Status: ON HOLD | COMMUNITY

## 2023-09-29 RX ORDER — BENZTROPINE MESYLATE 0.5 MG/1
0.5 TABLET ORAL 2 TIMES DAILY
Status: ON HOLD | COMMUNITY

## 2023-09-29 RX ORDER — METOCLOPRAMIDE 5 MG/1
5 TABLET ORAL 4 TIMES DAILY
Status: ON HOLD | COMMUNITY
End: 2023-10-02 | Stop reason: HOSPADM

## 2023-09-29 RX ORDER — BENZONATATE 100 MG/1
100 CAPSULE ORAL 3 TIMES DAILY PRN
Status: ON HOLD | COMMUNITY
End: 2023-10-02 | Stop reason: HOSPADM

## 2023-09-29 RX ORDER — TAMSULOSIN HYDROCHLORIDE 0.4 MG/1
0.4 CAPSULE ORAL DAILY
Status: DISCONTINUED | OUTPATIENT
Start: 2023-09-29 | End: 2023-10-02 | Stop reason: HOSPADM

## 2023-09-29 RX ORDER — FAMOTIDINE 20 MG/1
20 TABLET, FILM COATED ORAL 2 TIMES DAILY
Status: ON HOLD | COMMUNITY
End: 2023-10-02 | Stop reason: HOSPADM

## 2023-09-29 RX ORDER — OXYCODONE HYDROCHLORIDE AND ACETAMINOPHEN 5; 325 MG/1; MG/1
1 TABLET ORAL EVERY 8 HOURS PRN
Status: COMPLETED | OUTPATIENT
Start: 2023-09-29 | End: 2023-09-30

## 2023-09-29 RX ADMIN — SODIUM CHLORIDE, PRESERVATIVE FREE 40 MG: 5 INJECTION INTRAVENOUS at 11:04

## 2023-09-29 RX ADMIN — BUSPIRONE HYDROCHLORIDE 10 MG: 10 TABLET ORAL at 08:40

## 2023-09-29 RX ADMIN — SODIUM CHLORIDE, PRESERVATIVE FREE 10 ML: 5 INJECTION INTRAVENOUS at 08:42

## 2023-09-29 RX ADMIN — METOCLOPRAMIDE 10 MG: 5 INJECTION, SOLUTION INTRAMUSCULAR; INTRAVENOUS at 15:57

## 2023-09-29 RX ADMIN — FERROUS SULFATE TAB EC 325 MG (65 MG FE EQUIVALENT) 325 MG: 325 (65 FE) TABLET DELAYED RESPONSE at 08:41

## 2023-09-29 RX ADMIN — CLONAZEPAM 0.5 MG: 0.5 TABLET ORAL at 08:52

## 2023-09-29 RX ADMIN — METOCLOPRAMIDE 10 MG: 5 INJECTION, SOLUTION INTRAMUSCULAR; INTRAVENOUS at 11:04

## 2023-09-29 RX ADMIN — SODIUM CHLORIDE, PRESERVATIVE FREE 10 ML: 5 INJECTION INTRAVENOUS at 21:24

## 2023-09-29 RX ADMIN — SODIUM CHLORIDE, POTASSIUM CHLORIDE, SODIUM LACTATE AND CALCIUM CHLORIDE: 600; 310; 30; 20 INJECTION, SOLUTION INTRAVENOUS at 17:19

## 2023-09-29 RX ADMIN — TRAZODONE HYDROCHLORIDE 100 MG: 100 TABLET ORAL at 21:24

## 2023-09-29 RX ADMIN — METOPROLOL SUCCINATE 25 MG: 25 TABLET, EXTENDED RELEASE ORAL at 08:40

## 2023-09-29 RX ADMIN — METOCLOPRAMIDE 10 MG: 5 INJECTION, SOLUTION INTRAMUSCULAR; INTRAVENOUS at 04:04

## 2023-09-29 RX ADMIN — QUETIAPINE FUMARATE 50 MG: 25 TABLET ORAL at 08:41

## 2023-09-29 RX ADMIN — Medication 1 CAPSULE: at 08:41

## 2023-09-29 RX ADMIN — MEROPENEM 1000 MG: 1 INJECTION, POWDER, FOR SOLUTION INTRAVENOUS at 02:42

## 2023-09-29 RX ADMIN — METOCLOPRAMIDE 10 MG: 5 INJECTION, SOLUTION INTRAMUSCULAR; INTRAVENOUS at 21:23

## 2023-09-29 RX ADMIN — ESCITALOPRAM 20 MG: 10 TABLET, FILM COATED ORAL at 08:40

## 2023-09-29 RX ADMIN — MEROPENEM 1000 MG: 1 INJECTION, POWDER, FOR SOLUTION INTRAVENOUS at 14:28

## 2023-09-29 RX ADMIN — TAMSULOSIN HYDROCHLORIDE 0.4 MG: 0.4 CAPSULE ORAL at 16:32

## 2023-09-29 RX ADMIN — GABAPENTIN 300 MG: 300 CAPSULE ORAL at 08:41

## 2023-09-29 RX ADMIN — QUETIAPINE FUMARATE 100 MG: 100 TABLET ORAL at 21:24

## 2023-09-29 RX ADMIN — GABAPENTIN 300 MG: 300 CAPSULE ORAL at 15:57

## 2023-09-29 RX ADMIN — LORAZEPAM 0.5 MG: 2 INJECTION INTRAMUSCULAR; INTRAVENOUS at 13:42

## 2023-09-29 RX ADMIN — DESMOPRESSIN ACETATE 40 MG: 0.2 TABLET ORAL at 21:24

## 2023-09-29 RX ADMIN — BUSPIRONE HYDROCHLORIDE 10 MG: 10 TABLET ORAL at 21:24

## 2023-09-29 RX ADMIN — GABAPENTIN 300 MG: 300 CAPSULE ORAL at 21:24

## 2023-09-29 RX ADMIN — OXYCODONE AND ACETAMINOPHEN 1 TABLET: 5; 325 TABLET ORAL at 17:22

## 2023-09-29 RX ADMIN — CLONAZEPAM 0.5 MG: 0.5 TABLET ORAL at 16:32

## 2023-09-29 ASSESSMENT — ENCOUNTER SYMPTOMS
DIARRHEA: 0
EYE REDNESS: 0
SHORTNESS OF BREATH: 0
COUGH: 0
ABDOMINAL PAIN: 1
ABDOMINAL DISTENTION: 0
EYE ITCHING: 0
EYE DISCHARGE: 0
COLOR CHANGE: 0
DIARRHEA: 1
WHEEZING: 0
BACK PAIN: 0
ABDOMINAL PAIN: 0
VOMITING: 0
RHINORRHEA: 0
BLOOD IN STOOL: 0
NAUSEA: 0
CONSTIPATION: 1
EYE PAIN: 0

## 2023-09-29 ASSESSMENT — PAIN DESCRIPTION - DESCRIPTORS: DESCRIPTORS: SHARP;ACHING

## 2023-09-29 ASSESSMENT — PAIN DESCRIPTION - LOCATION: LOCATION: ABDOMEN

## 2023-09-29 ASSESSMENT — PAIN SCALES - GENERAL: PAINLEVEL_OUTOF10: 6

## 2023-09-29 ASSESSMENT — PAIN DESCRIPTION - ORIENTATION: ORIENTATION: LOWER

## 2023-09-30 PROBLEM — G89.29 CHRONIC PAIN: Status: RESOLVED | Noted: 2020-06-19 | Resolved: 2023-09-30

## 2023-09-30 PROBLEM — R10.84 GENERALIZED ABDOMINAL PAIN: Status: RESOLVED | Noted: 2021-12-18 | Resolved: 2023-09-30

## 2023-09-30 PROBLEM — M54.9 BACKACHE: Status: RESOLVED | Noted: 2019-09-11 | Resolved: 2023-09-30

## 2023-09-30 PROBLEM — R06.09 DYSPNEA ON EXERTION: Status: RESOLVED | Noted: 2020-06-19 | Resolved: 2023-09-30

## 2023-09-30 PROBLEM — R50.9 FEVER: Status: RESOLVED | Noted: 2020-06-02 | Resolved: 2023-09-30

## 2023-09-30 PROBLEM — M79.605 LEFT LEG PAIN: Status: RESOLVED | Noted: 2023-09-28 | Resolved: 2023-09-30

## 2023-09-30 PROBLEM — U07.1 COVID: Status: RESOLVED | Noted: 2023-01-02 | Resolved: 2023-09-30

## 2023-09-30 PROBLEM — R33.9 URINARY RETENTION: Status: RESOLVED | Noted: 2018-05-01 | Resolved: 2023-09-30

## 2023-09-30 PROBLEM — R09.02 HYPOXIA: Status: RESOLVED | Noted: 2023-01-02 | Resolved: 2023-09-30

## 2023-09-30 PROBLEM — R11.2 NAUSEA AND VOMITING: Status: RESOLVED | Noted: 2023-07-20 | Resolved: 2023-09-30

## 2023-09-30 PROBLEM — N17.9 AKI (ACUTE KIDNEY INJURY) (HCC): Status: RESOLVED | Noted: 2018-05-01 | Resolved: 2023-09-30

## 2023-09-30 PROBLEM — R26.2 DIFFICULTY WALKING: Status: RESOLVED | Noted: 2020-06-19 | Resolved: 2023-09-30

## 2023-09-30 PROBLEM — R41.82 ALTERED MENTAL STATUS: Status: RESOLVED | Noted: 2023-02-04 | Resolved: 2023-09-30

## 2023-09-30 LAB
ANION GAP SERPL CALCULATED.3IONS-SCNC: 10 MMOL/L (ref 9–17)
BASOPHILS # BLD: <0.03 K/UL (ref 0–0.2)
BASOPHILS NFR BLD: 0 % (ref 0–2)
BUN SERPL-MCNC: 5 MG/DL (ref 8–23)
CALCIUM SERPL-MCNC: 8.3 MG/DL (ref 8.6–10.4)
CHLORIDE SERPL-SCNC: 105 MMOL/L (ref 98–107)
CO2 SERPL-SCNC: 24 MMOL/L (ref 20–31)
CREAT SERPL-MCNC: 0.7 MG/DL (ref 0.5–0.9)
CRP SERPL HS-MCNC: 23.7 MG/L (ref 0–5)
EOSINOPHIL # BLD: 0.32 K/UL (ref 0–0.44)
EOSINOPHILS RELATIVE PERCENT: 7 % (ref 1–4)
ERYTHROCYTE [DISTWIDTH] IN BLOOD BY AUTOMATED COUNT: 15.6 % (ref 11.8–14.4)
GFR SERPL CREATININE-BSD FRML MDRD: >60 ML/MIN/1.73M2
GLUCOSE SERPL-MCNC: 101 MG/DL (ref 70–99)
HCT VFR BLD AUTO: 35.2 % (ref 36.3–47.1)
HGB BLD-MCNC: 10.3 G/DL (ref 11.9–15.1)
IMM GRANULOCYTES # BLD AUTO: 0.04 K/UL (ref 0–0.3)
IMM GRANULOCYTES NFR BLD: 1 %
LYMPHOCYTES NFR BLD: 0.81 K/UL (ref 1.1–3.7)
LYMPHOCYTES RELATIVE PERCENT: 17 % (ref 24–43)
MCH RBC QN AUTO: 30.5 PG (ref 25.2–33.5)
MCHC RBC AUTO-ENTMCNC: 29.3 G/DL (ref 28.4–34.8)
MCV RBC AUTO: 104.1 FL (ref 82.6–102.9)
MICROORGANISM SPEC CULT: NORMAL
MONOCYTES NFR BLD: 0.72 K/UL (ref 0.1–1.2)
MONOCYTES NFR BLD: 15 % (ref 3–12)
NEUTROPHILS NFR BLD: 60 % (ref 36–65)
NEUTS SEG NFR BLD: 2.83 K/UL (ref 1.5–8.1)
NRBC BLD-RTO: 0 PER 100 WBC
PLATELET # BLD AUTO: 163 K/UL (ref 138–453)
PMV BLD AUTO: 10.1 FL (ref 8.1–13.5)
POTASSIUM SERPL-SCNC: 3.7 MMOL/L (ref 3.7–5.3)
RBC # BLD AUTO: 3.38 M/UL (ref 3.95–5.11)
RBC # BLD: ABNORMAL 10*6/UL
RBC # BLD: ABNORMAL 10*6/UL
SODIUM SERPL-SCNC: 139 MMOL/L (ref 135–144)
SPECIMEN DESCRIPTION: NORMAL
WBC OTHER # BLD: 4.7 K/UL (ref 3.5–11.3)

## 2023-09-30 PROCEDURE — 6370000000 HC RX 637 (ALT 250 FOR IP): Performed by: NURSE PRACTITIONER

## 2023-09-30 PROCEDURE — 94761 N-INVAS EAR/PLS OXIMETRY MLT: CPT

## 2023-09-30 PROCEDURE — 2580000003 HC RX 258: Performed by: NURSE PRACTITIONER

## 2023-09-30 PROCEDURE — 6360000002 HC RX W HCPCS: Performed by: FAMILY MEDICINE

## 2023-09-30 PROCEDURE — 99232 SBSQ HOSP IP/OBS MODERATE 35: CPT | Performed by: INTERNAL MEDICINE

## 2023-09-30 PROCEDURE — 6360000002 HC RX W HCPCS: Performed by: INTERNAL MEDICINE

## 2023-09-30 PROCEDURE — 36415 COLL VENOUS BLD VENIPUNCTURE: CPT

## 2023-09-30 PROCEDURE — 6370000000 HC RX 637 (ALT 250 FOR IP): Performed by: FAMILY MEDICINE

## 2023-09-30 PROCEDURE — 85025 COMPLETE CBC W/AUTO DIFF WBC: CPT

## 2023-09-30 PROCEDURE — 6370000000 HC RX 637 (ALT 250 FOR IP): Performed by: STUDENT IN AN ORGANIZED HEALTH CARE EDUCATION/TRAINING PROGRAM

## 2023-09-30 PROCEDURE — C9113 INJ PANTOPRAZOLE SODIUM, VIA: HCPCS | Performed by: FAMILY MEDICINE

## 2023-09-30 PROCEDURE — 86140 C-REACTIVE PROTEIN: CPT

## 2023-09-30 PROCEDURE — 2580000003 HC RX 258: Performed by: INTERNAL MEDICINE

## 2023-09-30 PROCEDURE — 99232 SBSQ HOSP IP/OBS MODERATE 35: CPT | Performed by: STUDENT IN AN ORGANIZED HEALTH CARE EDUCATION/TRAINING PROGRAM

## 2023-09-30 PROCEDURE — 2580000003 HC RX 258: Performed by: FAMILY MEDICINE

## 2023-09-30 PROCEDURE — 80048 BASIC METABOLIC PNL TOTAL CA: CPT

## 2023-09-30 PROCEDURE — 1200000000 HC SEMI PRIVATE

## 2023-09-30 RX ADMIN — DESMOPRESSIN ACETATE 40 MG: 0.2 TABLET ORAL at 20:36

## 2023-09-30 RX ADMIN — CLONAZEPAM 0.5 MG: 0.5 TABLET ORAL at 09:26

## 2023-09-30 RX ADMIN — METOCLOPRAMIDE 10 MG: 5 INJECTION, SOLUTION INTRAMUSCULAR; INTRAVENOUS at 05:16

## 2023-09-30 RX ADMIN — LORAZEPAM 0.5 MG: 2 INJECTION INTRAMUSCULAR; INTRAVENOUS at 13:44

## 2023-09-30 RX ADMIN — METOCLOPRAMIDE 10 MG: 5 INJECTION, SOLUTION INTRAMUSCULAR; INTRAVENOUS at 16:10

## 2023-09-30 RX ADMIN — OXYCODONE AND ACETAMINOPHEN 1 TABLET: 5; 325 TABLET ORAL at 02:04

## 2023-09-30 RX ADMIN — Medication 1 CAPSULE: at 09:27

## 2023-09-30 RX ADMIN — GABAPENTIN 300 MG: 300 CAPSULE ORAL at 20:36

## 2023-09-30 RX ADMIN — METOCLOPRAMIDE 10 MG: 5 INJECTION, SOLUTION INTRAMUSCULAR; INTRAVENOUS at 21:43

## 2023-09-30 RX ADMIN — SODIUM CHLORIDE, PRESERVATIVE FREE 10 ML: 5 INJECTION INTRAVENOUS at 09:28

## 2023-09-30 RX ADMIN — BUSPIRONE HYDROCHLORIDE 10 MG: 10 TABLET ORAL at 20:37

## 2023-09-30 RX ADMIN — BUSPIRONE HYDROCHLORIDE 10 MG: 10 TABLET ORAL at 09:27

## 2023-09-30 RX ADMIN — QUETIAPINE FUMARATE 100 MG: 100 TABLET ORAL at 20:37

## 2023-09-30 RX ADMIN — TRAZODONE HYDROCHLORIDE 100 MG: 100 TABLET ORAL at 20:37

## 2023-09-30 RX ADMIN — CLONAZEPAM 0.5 MG: 0.5 TABLET ORAL at 17:47

## 2023-09-30 RX ADMIN — QUETIAPINE FUMARATE 50 MG: 25 TABLET ORAL at 09:27

## 2023-09-30 RX ADMIN — FERROUS SULFATE TAB EC 325 MG (65 MG FE EQUIVALENT) 325 MG: 325 (65 FE) TABLET DELAYED RESPONSE at 09:27

## 2023-09-30 RX ADMIN — METOCLOPRAMIDE 10 MG: 5 INJECTION, SOLUTION INTRAMUSCULAR; INTRAVENOUS at 11:13

## 2023-09-30 RX ADMIN — ACETAMINOPHEN 650 MG: 325 TABLET ORAL at 17:47

## 2023-09-30 RX ADMIN — OXYCODONE AND ACETAMINOPHEN 1 TABLET: 5; 325 TABLET ORAL at 11:13

## 2023-09-30 RX ADMIN — SODIUM CHLORIDE, PRESERVATIVE FREE 40 MG: 5 INJECTION INTRAVENOUS at 11:13

## 2023-09-30 RX ADMIN — ESCITALOPRAM 20 MG: 10 TABLET, FILM COATED ORAL at 09:26

## 2023-09-30 RX ADMIN — GABAPENTIN 300 MG: 300 CAPSULE ORAL at 09:27

## 2023-09-30 RX ADMIN — OXYCODONE AND ACETAMINOPHEN 1 TABLET: 5; 325 TABLET ORAL at 20:36

## 2023-09-30 RX ADMIN — GABAPENTIN 300 MG: 300 CAPSULE ORAL at 16:10

## 2023-09-30 RX ADMIN — CLONAZEPAM 0.5 MG: 0.5 TABLET ORAL at 00:30

## 2023-09-30 RX ADMIN — METOPROLOL SUCCINATE 25 MG: 25 TABLET, EXTENDED RELEASE ORAL at 09:27

## 2023-09-30 RX ADMIN — ERTAPENEM SODIUM 1000 MG: 1 INJECTION INTRAMUSCULAR; INTRAVENOUS at 16:18

## 2023-09-30 RX ADMIN — MEROPENEM 1000 MG: 1 INJECTION, POWDER, FOR SOLUTION INTRAVENOUS at 02:05

## 2023-09-30 RX ADMIN — TAMSULOSIN HYDROCHLORIDE 0.4 MG: 0.4 CAPSULE ORAL at 09:27

## 2023-09-30 RX ADMIN — SODIUM CHLORIDE, PRESERVATIVE FREE 10 ML: 5 INJECTION INTRAVENOUS at 21:44

## 2023-09-30 RX ADMIN — SODIUM CHLORIDE, POTASSIUM CHLORIDE, SODIUM LACTATE AND CALCIUM CHLORIDE: 600; 310; 30; 20 INJECTION, SOLUTION INTRAVENOUS at 13:50

## 2023-09-30 ASSESSMENT — PAIN DESCRIPTION - LOCATION
LOCATION: ABDOMEN

## 2023-09-30 ASSESSMENT — PAIN DESCRIPTION - DESCRIPTORS
DESCRIPTORS: ACHING
DESCRIPTORS: ACHING;SORE
DESCRIPTORS: ACHING;DISCOMFORT
DESCRIPTORS: SQUEEZING;SPASM

## 2023-09-30 ASSESSMENT — ENCOUNTER SYMPTOMS
COLOR CHANGE: 0
DIARRHEA: 0
WHEEZING: 0
COUGH: 0
RHINORRHEA: 0
ABDOMINAL DISTENTION: 0
DIARRHEA: 1
NAUSEA: 0
EYE DISCHARGE: 0
VOMITING: 0
ABDOMINAL PAIN: 1
EYE ITCHING: 0
EYE PAIN: 0
BACK PAIN: 0
BLOOD IN STOOL: 0
ABDOMINAL PAIN: 0
CONSTIPATION: 0
SHORTNESS OF BREATH: 0
EYE REDNESS: 0

## 2023-09-30 ASSESSMENT — PAIN SCALES - GENERAL
PAINLEVEL_OUTOF10: 3
PAINLEVEL_OUTOF10: 6
PAINLEVEL_OUTOF10: 5
PAINLEVEL_OUTOF10: 7
PAINLEVEL_OUTOF10: 7

## 2023-09-30 ASSESSMENT — PAIN DESCRIPTION - FREQUENCY: FREQUENCY: CONTINUOUS

## 2023-09-30 ASSESSMENT — PAIN DESCRIPTION - ORIENTATION
ORIENTATION: RIGHT
ORIENTATION: LOWER
ORIENTATION: RIGHT
ORIENTATION: LOWER

## 2023-09-30 ASSESSMENT — PAIN DESCRIPTION - PAIN TYPE: TYPE: ACUTE PAIN

## 2023-09-30 ASSESSMENT — PAIN DESCRIPTION - ONSET: ONSET: ON-GOING

## 2023-10-01 LAB
ANION GAP SERPL CALCULATED.3IONS-SCNC: 10 MMOL/L (ref 9–17)
BASOPHILS # BLD: <0.03 K/UL (ref 0–0.2)
BASOPHILS NFR BLD: 0 % (ref 0–2)
BUN SERPL-MCNC: 6 MG/DL (ref 8–23)
CALCIUM SERPL-MCNC: 8.4 MG/DL (ref 8.6–10.4)
CHLORIDE SERPL-SCNC: 104 MMOL/L (ref 98–107)
CO2 SERPL-SCNC: 24 MMOL/L (ref 20–31)
CREAT SERPL-MCNC: 0.7 MG/DL (ref 0.5–0.9)
EOSINOPHIL # BLD: 0.32 K/UL (ref 0–0.44)
EOSINOPHILS RELATIVE PERCENT: 5 % (ref 1–4)
ERYTHROCYTE [DISTWIDTH] IN BLOOD BY AUTOMATED COUNT: 15.3 % (ref 11.8–14.4)
GFR SERPL CREATININE-BSD FRML MDRD: >60 ML/MIN/1.73M2
GLUCOSE SERPL-MCNC: 98 MG/DL (ref 70–99)
HCT VFR BLD AUTO: 35.2 % (ref 36.3–47.1)
HGB BLD-MCNC: 10.5 G/DL (ref 11.9–15.1)
IMM GRANULOCYTES # BLD AUTO: 0.06 K/UL (ref 0–0.3)
IMM GRANULOCYTES NFR BLD: 1 %
LYMPHOCYTES NFR BLD: 0.74 K/UL (ref 1.1–3.7)
LYMPHOCYTES RELATIVE PERCENT: 10 % (ref 24–43)
MCH RBC QN AUTO: 30 PG (ref 25.2–33.5)
MCHC RBC AUTO-ENTMCNC: 29.8 G/DL (ref 28.4–34.8)
MCV RBC AUTO: 100.6 FL (ref 82.6–102.9)
MONOCYTES NFR BLD: 0.57 K/UL (ref 0.1–1.2)
MONOCYTES NFR BLD: 8 % (ref 3–12)
NEUTROPHILS NFR BLD: 76 % (ref 36–65)
NEUTS SEG NFR BLD: 5.39 K/UL (ref 1.5–8.1)
NRBC BLD-RTO: 0 PER 100 WBC
PLATELET # BLD AUTO: 170 K/UL (ref 138–453)
PMV BLD AUTO: 10.5 FL (ref 8.1–13.5)
POTASSIUM SERPL-SCNC: 4.1 MMOL/L (ref 3.7–5.3)
RBC # BLD AUTO: 3.5 M/UL (ref 3.95–5.11)
RBC # BLD: ABNORMAL 10*6/UL
SODIUM SERPL-SCNC: 138 MMOL/L (ref 135–144)
WBC OTHER # BLD: 7.1 K/UL (ref 3.5–11.3)

## 2023-10-01 PROCEDURE — 99232 SBSQ HOSP IP/OBS MODERATE 35: CPT | Performed by: STUDENT IN AN ORGANIZED HEALTH CARE EDUCATION/TRAINING PROGRAM

## 2023-10-01 PROCEDURE — 85025 COMPLETE CBC W/AUTO DIFF WBC: CPT

## 2023-10-01 PROCEDURE — 6370000000 HC RX 637 (ALT 250 FOR IP): Performed by: NURSE PRACTITIONER

## 2023-10-01 PROCEDURE — 6360000002 HC RX W HCPCS: Performed by: INTERNAL MEDICINE

## 2023-10-01 PROCEDURE — 6370000000 HC RX 637 (ALT 250 FOR IP): Performed by: STUDENT IN AN ORGANIZED HEALTH CARE EDUCATION/TRAINING PROGRAM

## 2023-10-01 PROCEDURE — 36415 COLL VENOUS BLD VENIPUNCTURE: CPT

## 2023-10-01 PROCEDURE — 80048 BASIC METABOLIC PNL TOTAL CA: CPT

## 2023-10-01 PROCEDURE — 6360000002 HC RX W HCPCS: Performed by: FAMILY MEDICINE

## 2023-10-01 PROCEDURE — 1200000000 HC SEMI PRIVATE

## 2023-10-01 PROCEDURE — 99232 SBSQ HOSP IP/OBS MODERATE 35: CPT | Performed by: INTERNAL MEDICINE

## 2023-10-01 PROCEDURE — C9113 INJ PANTOPRAZOLE SODIUM, VIA: HCPCS | Performed by: FAMILY MEDICINE

## 2023-10-01 PROCEDURE — 6370000000 HC RX 637 (ALT 250 FOR IP): Performed by: FAMILY MEDICINE

## 2023-10-01 PROCEDURE — 2580000003 HC RX 258: Performed by: INTERNAL MEDICINE

## 2023-10-01 PROCEDURE — 2580000003 HC RX 258: Performed by: NURSE PRACTITIONER

## 2023-10-01 PROCEDURE — 2580000003 HC RX 258: Performed by: FAMILY MEDICINE

## 2023-10-01 RX ORDER — DOCUSATE SODIUM 100 MG/1
100 CAPSULE, LIQUID FILLED ORAL 3 TIMES DAILY
Status: DISCONTINUED | OUTPATIENT
Start: 2023-10-01 | End: 2023-10-02 | Stop reason: HOSPADM

## 2023-10-01 RX ORDER — OXYCODONE HYDROCHLORIDE AND ACETAMINOPHEN 5; 325 MG/1; MG/1
1 TABLET ORAL EVERY 6 HOURS PRN
Status: DISCONTINUED | OUTPATIENT
Start: 2023-10-01 | End: 2023-10-02 | Stop reason: HOSPADM

## 2023-10-01 RX ADMIN — DESMOPRESSIN ACETATE 40 MG: 0.2 TABLET ORAL at 20:46

## 2023-10-01 RX ADMIN — METOCLOPRAMIDE 10 MG: 5 INJECTION, SOLUTION INTRAMUSCULAR; INTRAVENOUS at 15:58

## 2023-10-01 RX ADMIN — TAMSULOSIN HYDROCHLORIDE 0.4 MG: 0.4 CAPSULE ORAL at 09:02

## 2023-10-01 RX ADMIN — METOPROLOL SUCCINATE 25 MG: 25 TABLET, EXTENDED RELEASE ORAL at 09:02

## 2023-10-01 RX ADMIN — BUSPIRONE HYDROCHLORIDE 10 MG: 10 TABLET ORAL at 09:02

## 2023-10-01 RX ADMIN — QUETIAPINE FUMARATE 100 MG: 100 TABLET ORAL at 20:46

## 2023-10-01 RX ADMIN — METOCLOPRAMIDE 10 MG: 5 INJECTION, SOLUTION INTRAMUSCULAR; INTRAVENOUS at 03:43

## 2023-10-01 RX ADMIN — SODIUM CHLORIDE, POTASSIUM CHLORIDE, SODIUM LACTATE AND CALCIUM CHLORIDE: 600; 310; 30; 20 INJECTION, SOLUTION INTRAVENOUS at 23:18

## 2023-10-01 RX ADMIN — GABAPENTIN 300 MG: 300 CAPSULE ORAL at 20:46

## 2023-10-01 RX ADMIN — GABAPENTIN 300 MG: 300 CAPSULE ORAL at 09:02

## 2023-10-01 RX ADMIN — CLONAZEPAM 0.5 MG: 0.5 TABLET ORAL at 07:53

## 2023-10-01 RX ADMIN — Medication 1 CAPSULE: at 09:02

## 2023-10-01 RX ADMIN — GABAPENTIN 300 MG: 300 CAPSULE ORAL at 15:59

## 2023-10-01 RX ADMIN — SODIUM CHLORIDE, POTASSIUM CHLORIDE, SODIUM LACTATE AND CALCIUM CHLORIDE: 600; 310; 30; 20 INJECTION, SOLUTION INTRAVENOUS at 04:00

## 2023-10-01 RX ADMIN — OXYCODONE AND ACETAMINOPHEN 1 TABLET: 5; 325 TABLET ORAL at 07:53

## 2023-10-01 RX ADMIN — ACETAMINOPHEN 650 MG: 325 TABLET ORAL at 19:38

## 2023-10-01 RX ADMIN — SODIUM CHLORIDE, POTASSIUM CHLORIDE, SODIUM LACTATE AND CALCIUM CHLORIDE: 600; 310; 30; 20 INJECTION, SOLUTION INTRAVENOUS at 13:06

## 2023-10-01 RX ADMIN — TRAZODONE HYDROCHLORIDE 100 MG: 100 TABLET ORAL at 20:46

## 2023-10-01 RX ADMIN — POLYETHYLENE GLYCOL 3350 17 G: 17 POWDER, FOR SOLUTION ORAL at 09:17

## 2023-10-01 RX ADMIN — SODIUM CHLORIDE, PRESERVATIVE FREE 10 ML: 5 INJECTION INTRAVENOUS at 09:03

## 2023-10-01 RX ADMIN — OXYCODONE AND ACETAMINOPHEN 1 TABLET: 5; 325 TABLET ORAL at 20:55

## 2023-10-01 RX ADMIN — ACETAMINOPHEN 650 MG: 325 TABLET ORAL at 06:15

## 2023-10-01 RX ADMIN — OXYCODONE AND ACETAMINOPHEN 1 TABLET: 5; 325 TABLET ORAL at 14:05

## 2023-10-01 RX ADMIN — FERROUS SULFATE TAB EC 325 MG (65 MG FE EQUIVALENT) 325 MG: 325 (65 FE) TABLET DELAYED RESPONSE at 07:53

## 2023-10-01 RX ADMIN — ESCITALOPRAM 20 MG: 10 TABLET, FILM COATED ORAL at 09:02

## 2023-10-01 RX ADMIN — METOCLOPRAMIDE 10 MG: 5 INJECTION, SOLUTION INTRAMUSCULAR; INTRAVENOUS at 20:47

## 2023-10-01 RX ADMIN — DOCUSATE SODIUM 100 MG: 100 CAPSULE, LIQUID FILLED ORAL at 14:05

## 2023-10-01 RX ADMIN — QUETIAPINE FUMARATE 50 MG: 25 TABLET ORAL at 09:02

## 2023-10-01 RX ADMIN — ERTAPENEM SODIUM 1000 MG: 1 INJECTION INTRAMUSCULAR; INTRAVENOUS at 15:56

## 2023-10-01 RX ADMIN — BUSPIRONE HYDROCHLORIDE 10 MG: 10 TABLET ORAL at 20:46

## 2023-10-01 RX ADMIN — METOCLOPRAMIDE 10 MG: 5 INJECTION, SOLUTION INTRAMUSCULAR; INTRAVENOUS at 11:06

## 2023-10-01 RX ADMIN — SODIUM CHLORIDE, PRESERVATIVE FREE 40 MG: 5 INJECTION INTRAVENOUS at 09:02

## 2023-10-01 RX ADMIN — SODIUM CHLORIDE, PRESERVATIVE FREE 10 ML: 5 INJECTION INTRAVENOUS at 20:47

## 2023-10-01 RX ADMIN — CLONAZEPAM 0.5 MG: 0.5 TABLET ORAL at 15:59

## 2023-10-01 RX ADMIN — ACETAMINOPHEN 650 MG: 325 TABLET ORAL at 13:05

## 2023-10-01 ASSESSMENT — PAIN DESCRIPTION - DESCRIPTORS
DESCRIPTORS: ACHING;SORE
DESCRIPTORS: ACHING;DISCOMFORT;CRAMPING
DESCRIPTORS: ACHING;DISCOMFORT
DESCRIPTORS: ACHING
DESCRIPTORS: ACHING;DISCOMFORT
DESCRIPTORS: DISCOMFORT

## 2023-10-01 ASSESSMENT — PAIN DESCRIPTION - ORIENTATION
ORIENTATION: RIGHT
ORIENTATION: RIGHT
ORIENTATION: LOWER
ORIENTATION: RIGHT
ORIENTATION: RIGHT

## 2023-10-01 ASSESSMENT — ENCOUNTER SYMPTOMS
NAUSEA: 0
WHEEZING: 0
EYE ITCHING: 0
COUGH: 0
VOMITING: 0
DIARRHEA: 0
CONSTIPATION: 0
RHINORRHEA: 0
EYE REDNESS: 0
SHORTNESS OF BREATH: 0
EYE PAIN: 0
COLOR CHANGE: 0
EYE DISCHARGE: 0
BLOOD IN STOOL: 0
BACK PAIN: 0
ABDOMINAL PAIN: 1
ABDOMINAL DISTENTION: 0

## 2023-10-01 ASSESSMENT — PAIN DESCRIPTION - PAIN TYPE
TYPE: ACUTE PAIN;CHRONIC PAIN
TYPE: ACUTE PAIN;CHRONIC PAIN

## 2023-10-01 ASSESSMENT — PAIN SCALES - GENERAL
PAINLEVEL_OUTOF10: 6
PAINLEVEL_OUTOF10: 0
PAINLEVEL_OUTOF10: 3
PAINLEVEL_OUTOF10: 2
PAINLEVEL_OUTOF10: 8
PAINLEVEL_OUTOF10: 3
PAINLEVEL_OUTOF10: 8
PAINLEVEL_OUTOF10: 7

## 2023-10-01 ASSESSMENT — PAIN DESCRIPTION - LOCATION
LOCATION: ABDOMEN

## 2023-10-01 ASSESSMENT — PAIN DESCRIPTION - FREQUENCY
FREQUENCY: CONTINUOUS
FREQUENCY: CONTINUOUS

## 2023-10-01 ASSESSMENT — PAIN DESCRIPTION - ONSET
ONSET: ON-GOING
ONSET: ON-GOING

## 2023-10-01 ASSESSMENT — PAIN - FUNCTIONAL ASSESSMENT: PAIN_FUNCTIONAL_ASSESSMENT: ACTIVITIES ARE NOT PREVENTED

## 2023-10-01 NOTE — PLAN OF CARE
Problem: Discharge Planning  Goal: Discharge to home or other facility with appropriate resources  10/1/2023 0254 by Felton López  Outcome: Progressing  10/1/2023 0252 by Felton López  Outcome: Progressing  9/30/2023 1807 by Aravind Silva RN  Outcome: Progressing     Problem: Safety - Adult  Goal: Free from fall injury  10/1/2023 0254 by Felton López  Outcome: Progressing  10/1/2023 0252 by Felton López  Outcome: Progressing  9/30/2023 1807 by Aravind Silva RN  Outcome: Progressing     Problem: Pain  Goal: Verbalizes/displays adequate comfort level or baseline comfort level  10/1/2023 0254 by Felton López  Outcome: Progressing  10/1/2023 0252 by Felton López  Outcome: Progressing  9/30/2023 1807 by Aravind Silva RN  Outcome: Progressing     Problem: Skin/Tissue Integrity  Goal: Absence of new skin breakdown  Description: 1. Monitor for areas of redness and/or skin breakdown  2. Assess vascular access sites hourly  3. Every 4-6 hours minimum:  Change oxygen saturation probe site  4. Every 4-6 hours:  If on nasal continuous positive airway pressure, respiratory therapy assess nares and determine need for appliance change or resting period.   10/1/2023 0254 by Felton López  Outcome: Progressing  10/1/2023 0252 by Felton López  Outcome: Progressing  9/30/2023 1807 by Aravind Silva RN  Outcome: Progressing

## 2023-10-01 NOTE — DISCHARGE INSTR - COC
2/8/2021    **CASE IN O.R. W/ GI STAFF - EGD WITH REMOVAL PEG TUBE performed by Ryan Mcqueen MD at [facility] Endoscopy    HAND SURGERY      [facility] CATH POWER PICC TRIPLE  03/04/2020         HERNIA REPAIR      Hiatal hernia    HYSTERECTOMY (CERVIX STATUS UNKNOWN)      Abdominal    IR NONTUNNELED VASCULAR CATHETER  5/10/2022    IR NONTUNNELED VASCULAR CATHETER 5/10/2022 Maddie Gomez MD [facility]    JOINT REPLACEMENT Right     right hip    OVARY REMOVAL      RHINOPLASTY      TONSILLECTOMY      TOTAL HIP ARTHROPLASTY      TOTAL NEPHRECTOMY      atrophic from infections, age 13    TRACHEOSTOMY N/A 03/27/2020    **ADD ON **WANTS 10:00AM **TRACHEOTOMY performed by Tano Kelly MD at [facility] N/A 04/02/2020    TRACHEOTOMY EXCHANGE performed by Tano Kelly MD at [facility] 03/17/2020    BEDSIDE EGD ESOPHAGOGASTRODUODENOSCOPY (ICU) performed by Tano Kelly MD at [address] [place] [address] 05/18/2020    EGD BIOPSY performed by Francisco J Conroy MD at [address] [place] [address]  05/18/2020    EGD DILATION BALLOON performed by Francisc oJ Conroy MD at [address] [place] [address] N/A 07/06/2020    ** CASE IN O.R. WITH GI STAFF** EGD ESOPHAGOGASTRODUODENOSCOPY performed by Kalina Kyle MD at [address] [place] [address] 11/09/2020    EGD DIAGNOSTIC ONLY performed by Marcy Magallon MD at [address] [place] [address]  02/08/2021    - EGD WITH REMOVAL PEG TUBE,ERCP STENT REMOVAL,ERCP STONE REMOVAL    UPPER GASTROINTESTINAL ENDOSCOPY N/A 11/24/2021    EGD BIOPSY performed by Marcy Magallon MD at [address] [place] [address] 9/8/2022    EGD BIOPSY performed by Francisco J Conroy MD at [address] [place] [address] 10/11/2022    EGD BIOPSY performed by Francisco J Conroy MD at [facility]

## 2023-10-01 NOTE — PLAN OF CARE
Problem: Discharge Planning  Goal: Discharge to home or other facility with appropriate resources  10/1/2023 1507 by Hortencia Vega RN  Outcome: Progressing  10/1/2023 0254 by Jessenia Welch  Outcome: Progressing  10/1/2023 0252 by Jessenia Welch  Outcome: Progressing     Problem: Safety - Adult  Goal: Free from fall injury  10/1/2023 1507 by Hortencia Vega RN  Outcome: Progressing  10/1/2023 0254 by Jessenia Welch  Outcome: Progressing  10/1/2023 0252 by Jessenia Welch  Outcome: Progressing     Problem: Pain  Goal: Verbalizes/displays adequate comfort level or baseline comfort level  10/1/2023 1507 by Hortencia Vega RN  Outcome: Progressing  10/1/2023 0254 by Jessenia Welch  Outcome: Progressing  10/1/2023 0252 by Jessenia Welch  Outcome: Progressing     Problem: Skin/Tissue Integrity  Goal: Absence of new skin breakdown  Description: 1. Monitor for areas of redness and/or skin breakdown  2. Assess vascular access sites hourly  3. Every 4-6 hours minimum:  Change oxygen saturation probe site  4. Every 4-6 hours:  If on nasal continuous positive airway pressure, respiratory therapy assess nares and determine need for appliance change or resting period.   10/1/2023 1507 by Hortencia Vega RN  Outcome: Progressing  10/1/2023 0254 by Jessenia Welch  Outcome: Progressing  10/1/2023 0252 by Jessenia Welch  Outcome: Progressing

## 2023-10-01 NOTE — PLAN OF CARE
Problem: Discharge Planning  Goal: Discharge to home or other facility with appropriate resources  10/1/2023 0252 by Tomy Hilario  Outcome: Progressing  9/30/2023 1807 by Carlene Salamanca RN  Outcome: Progressing     Problem: Safety - Adult  Goal: Free from fall injury  10/1/2023 0252 by Tomy Hilario  Outcome: Progressing  9/30/2023 1807 by Carlene Salamanca RN  Outcome: Progressing     Problem: Pain  Goal: Verbalizes/displays adequate comfort level or baseline comfort level  10/1/2023 0252 by Tomy Hilario  Outcome: Progressing  9/30/2023 1807 by Carlene Salamanca RN  Outcome: Progressing     Problem: Skin/Tissue Integrity  Goal: Absence of new skin breakdown  Description: 1. Monitor for areas of redness and/or skin breakdown  2. Assess vascular access sites hourly  3. Every 4-6 hours minimum:  Change oxygen saturation probe site  4. Every 4-6 hours:  If on nasal continuous positive airway pressure, respiratory therapy assess nares and determine need for appliance change or resting period.   10/1/2023 0252 by Tomy Hilario  Outcome: Progressing  9/30/2023 1807 by Carlene Salamanca RN  Outcome: Progressing

## 2023-10-02 VITALS
OXYGEN SATURATION: 94 % | TEMPERATURE: 98.1 F | DIASTOLIC BLOOD PRESSURE: 71 MMHG | WEIGHT: 194 LBS | BODY MASS INDEX: 33.12 KG/M2 | SYSTOLIC BLOOD PRESSURE: 157 MMHG | HEIGHT: 64 IN | HEART RATE: 62 BPM | RESPIRATION RATE: 16 BRPM

## 2023-10-02 LAB
ANION GAP SERPL CALCULATED.3IONS-SCNC: 10 MMOL/L (ref 9–17)
BASOPHILS # BLD: <0.03 K/UL (ref 0–0.2)
BASOPHILS NFR BLD: 0 % (ref 0–2)
BUN SERPL-MCNC: 6 MG/DL (ref 8–23)
CALCIUM SERPL-MCNC: 8.6 MG/DL (ref 8.6–10.4)
CHLORIDE SERPL-SCNC: 104 MMOL/L (ref 98–107)
CO2 SERPL-SCNC: 25 MMOL/L (ref 20–31)
CREAT SERPL-MCNC: 0.7 MG/DL (ref 0.5–0.9)
EOSINOPHIL # BLD: 0.39 K/UL (ref 0–0.44)
EOSINOPHILS RELATIVE PERCENT: 8 % (ref 1–4)
ERYTHROCYTE [DISTWIDTH] IN BLOOD BY AUTOMATED COUNT: 15.3 % (ref 11.8–14.4)
GFR SERPL CREATININE-BSD FRML MDRD: >60 ML/MIN/1.73M2
GLUCOSE SERPL-MCNC: 109 MG/DL (ref 70–99)
HCT VFR BLD AUTO: 36.8 % (ref 36.3–47.1)
HGB BLD-MCNC: 11.1 G/DL (ref 11.9–15.1)
IMM GRANULOCYTES # BLD AUTO: 0.07 K/UL (ref 0–0.3)
IMM GRANULOCYTES NFR BLD: 1 %
LYMPHOCYTES NFR BLD: 1.29 K/UL (ref 1.1–3.7)
LYMPHOCYTES RELATIVE PERCENT: 25 % (ref 24–43)
MCH RBC QN AUTO: 30.4 PG (ref 25.2–33.5)
MCHC RBC AUTO-ENTMCNC: 30.2 G/DL (ref 28.4–34.8)
MCV RBC AUTO: 100.8 FL (ref 82.6–102.9)
MONOCYTES NFR BLD: 0.6 K/UL (ref 0.1–1.2)
MONOCYTES NFR BLD: 12 % (ref 3–12)
NEUTROPHILS NFR BLD: 54 % (ref 36–65)
NEUTS SEG NFR BLD: 2.85 K/UL (ref 1.5–8.1)
NRBC BLD-RTO: 0 PER 100 WBC
PLATELET # BLD AUTO: 178 K/UL (ref 138–453)
PMV BLD AUTO: 10.1 FL (ref 8.1–13.5)
POTASSIUM SERPL-SCNC: 4.1 MMOL/L (ref 3.7–5.3)
RBC # BLD AUTO: 3.65 M/UL (ref 3.95–5.11)
RBC # BLD: ABNORMAL 10*6/UL
SODIUM SERPL-SCNC: 139 MMOL/L (ref 135–144)
WBC OTHER # BLD: 5.2 K/UL (ref 3.5–11.3)

## 2023-10-02 PROCEDURE — 99232 SBSQ HOSP IP/OBS MODERATE 35: CPT | Performed by: INTERNAL MEDICINE

## 2023-10-02 PROCEDURE — 2580000003 HC RX 258: Performed by: NURSE PRACTITIONER

## 2023-10-02 PROCEDURE — 36415 COLL VENOUS BLD VENIPUNCTURE: CPT

## 2023-10-02 PROCEDURE — 2580000003 HC RX 258: Performed by: FAMILY MEDICINE

## 2023-10-02 PROCEDURE — 6370000000 HC RX 637 (ALT 250 FOR IP): Performed by: NURSE PRACTITIONER

## 2023-10-02 PROCEDURE — 6370000000 HC RX 637 (ALT 250 FOR IP): Performed by: FAMILY MEDICINE

## 2023-10-02 PROCEDURE — 85025 COMPLETE CBC W/AUTO DIFF WBC: CPT

## 2023-10-02 PROCEDURE — 6360000002 HC RX W HCPCS: Performed by: NURSE PRACTITIONER

## 2023-10-02 PROCEDURE — 94760 N-INVAS EAR/PLS OXIMETRY 1: CPT

## 2023-10-02 PROCEDURE — 6360000002 HC RX W HCPCS: Performed by: INTERNAL MEDICINE

## 2023-10-02 PROCEDURE — C9113 INJ PANTOPRAZOLE SODIUM, VIA: HCPCS | Performed by: FAMILY MEDICINE

## 2023-10-02 PROCEDURE — 99232 SBSQ HOSP IP/OBS MODERATE 35: CPT | Performed by: STUDENT IN AN ORGANIZED HEALTH CARE EDUCATION/TRAINING PROGRAM

## 2023-10-02 PROCEDURE — 6370000000 HC RX 637 (ALT 250 FOR IP): Performed by: STUDENT IN AN ORGANIZED HEALTH CARE EDUCATION/TRAINING PROGRAM

## 2023-10-02 PROCEDURE — 80048 BASIC METABOLIC PNL TOTAL CA: CPT

## 2023-10-02 PROCEDURE — 6360000002 HC RX W HCPCS: Performed by: FAMILY MEDICINE

## 2023-10-02 PROCEDURE — 2580000003 HC RX 258: Performed by: INTERNAL MEDICINE

## 2023-10-02 RX ORDER — CLONAZEPAM 0.5 MG/1
0.5 TABLET ORAL 3 TIMES DAILY PRN
Qty: 5 TABLET | Refills: 0 | Status: ON HOLD | OUTPATIENT
Start: 2023-10-02 | End: 2023-10-07

## 2023-10-02 RX ORDER — METOPROLOL SUCCINATE 25 MG/1
25 TABLET, EXTENDED RELEASE ORAL DAILY
Qty: 30 TABLET | Refills: 3 | Status: ON HOLD | OUTPATIENT
Start: 2023-10-02

## 2023-10-02 RX ORDER — OXYCODONE HYDROCHLORIDE AND ACETAMINOPHEN 5; 325 MG/1; MG/1
1 TABLET ORAL 3 TIMES DAILY PRN
Qty: 3 TABLET | Refills: 0 | Status: SHIPPED | OUTPATIENT
Start: 2023-10-02 | End: 2023-10-05

## 2023-10-02 RX ORDER — PANTOPRAZOLE SODIUM 40 MG/1
40 TABLET, DELAYED RELEASE ORAL
Status: DISCONTINUED | OUTPATIENT
Start: 2023-10-03 | End: 2023-10-02 | Stop reason: HOSPADM

## 2023-10-02 RX ORDER — TAMSULOSIN HYDROCHLORIDE 0.4 MG/1
0.4 CAPSULE ORAL DAILY
Qty: 30 CAPSULE | Refills: 3 | Status: ON HOLD | OUTPATIENT
Start: 2023-10-02

## 2023-10-02 RX ORDER — GABAPENTIN 300 MG/1
300 CAPSULE ORAL 3 TIMES DAILY
Qty: 15 CAPSULE | Refills: 0 | Status: ON HOLD | OUTPATIENT
Start: 2023-10-02 | End: 2023-10-07

## 2023-10-02 RX ORDER — PSEUDOEPHEDRINE HCL 30 MG
100 TABLET ORAL 3 TIMES DAILY
Qty: 30 CAPSULE | Refills: 0 | Status: SHIPPED | OUTPATIENT
Start: 2023-10-02 | End: 2023-10-07

## 2023-10-02 RX ADMIN — DOCUSATE SODIUM 100 MG: 100 CAPSULE, LIQUID FILLED ORAL at 08:53

## 2023-10-02 RX ADMIN — FERROUS SULFATE TAB EC 325 MG (65 MG FE EQUIVALENT) 325 MG: 325 (65 FE) TABLET DELAYED RESPONSE at 08:52

## 2023-10-02 RX ADMIN — GABAPENTIN 300 MG: 300 CAPSULE ORAL at 15:26

## 2023-10-02 RX ADMIN — QUETIAPINE FUMARATE 50 MG: 25 TABLET ORAL at 08:52

## 2023-10-02 RX ADMIN — CLONAZEPAM 0.5 MG: 0.5 TABLET ORAL at 08:53

## 2023-10-02 RX ADMIN — METOCLOPRAMIDE 10 MG: 5 INJECTION, SOLUTION INTRAMUSCULAR; INTRAVENOUS at 11:10

## 2023-10-02 RX ADMIN — METOPROLOL SUCCINATE 25 MG: 25 TABLET, EXTENDED RELEASE ORAL at 08:53

## 2023-10-02 RX ADMIN — ESCITALOPRAM 20 MG: 10 TABLET, FILM COATED ORAL at 08:52

## 2023-10-02 RX ADMIN — OXYCODONE AND ACETAMINOPHEN 1 TABLET: 5; 325 TABLET ORAL at 11:18

## 2023-10-02 RX ADMIN — ENOXAPARIN SODIUM 40 MG: 100 INJECTION SUBCUTANEOUS at 08:53

## 2023-10-02 RX ADMIN — SODIUM CHLORIDE, PRESERVATIVE FREE 40 MG: 5 INJECTION INTRAVENOUS at 08:53

## 2023-10-02 RX ADMIN — Medication 1 CAPSULE: at 08:53

## 2023-10-02 RX ADMIN — ACETAMINOPHEN 650 MG: 325 TABLET ORAL at 16:19

## 2023-10-02 RX ADMIN — GABAPENTIN 300 MG: 300 CAPSULE ORAL at 08:52

## 2023-10-02 RX ADMIN — ERTAPENEM SODIUM 1000 MG: 1 INJECTION INTRAMUSCULAR; INTRAVENOUS at 15:18

## 2023-10-02 RX ADMIN — BUSPIRONE HYDROCHLORIDE 10 MG: 10 TABLET ORAL at 08:52

## 2023-10-02 RX ADMIN — TAMSULOSIN HYDROCHLORIDE 0.4 MG: 0.4 CAPSULE ORAL at 08:53

## 2023-10-02 RX ADMIN — LORAZEPAM 0.5 MG: 2 INJECTION INTRAMUSCULAR; INTRAVENOUS at 15:22

## 2023-10-02 RX ADMIN — SODIUM CHLORIDE, PRESERVATIVE FREE 10 ML: 5 INJECTION INTRAVENOUS at 08:53

## 2023-10-02 RX ADMIN — OXYCODONE AND ACETAMINOPHEN 1 TABLET: 5; 325 TABLET ORAL at 04:40

## 2023-10-02 RX ADMIN — METOCLOPRAMIDE 10 MG: 5 INJECTION, SOLUTION INTRAMUSCULAR; INTRAVENOUS at 03:52

## 2023-10-02 ASSESSMENT — ENCOUNTER SYMPTOMS
EYE ITCHING: 0
VOMITING: 0
WHEEZING: 0
EYE DISCHARGE: 0
ABDOMINAL PAIN: 0
DIARRHEA: 0
NAUSEA: 0
EYE REDNESS: 0
SHORTNESS OF BREATH: 0
COLOR CHANGE: 0
EYE PAIN: 0
COUGH: 0
BLOOD IN STOOL: 0
ABDOMINAL PAIN: 1
ABDOMINAL DISTENTION: 0
BACK PAIN: 0
CONSTIPATION: 1
RHINORRHEA: 0

## 2023-10-02 ASSESSMENT — PAIN DESCRIPTION - LOCATION
LOCATION: ABDOMEN
LOCATION: ABDOMEN

## 2023-10-02 ASSESSMENT — PAIN DESCRIPTION - DESCRIPTORS
DESCRIPTORS: ACHING
DESCRIPTORS: SORE;TENDER

## 2023-10-02 ASSESSMENT — PAIN DESCRIPTION - ORIENTATION
ORIENTATION: RIGHT;LOWER
ORIENTATION: RIGHT

## 2023-10-02 ASSESSMENT — PAIN SCALES - GENERAL
PAINLEVEL_OUTOF10: 6
PAINLEVEL_OUTOF10: 8
PAINLEVEL_OUTOF10: 3

## 2023-10-02 NOTE — DISCHARGE INSTRUCTIONS
Bladder scan every 6 hours and straight cath for PVR more than 250 cc  Outpatient follow-up with psychiatrist for adjustment of antipsychotic medications. Patient is very adamant on not cutting down on any of her medications. Adjust medications in hospital stay as well.

## 2023-10-02 NOTE — DISCHARGE INSTR - DIET

## 2023-10-03 LAB
MICROORGANISM SPEC CULT: NORMAL
MICROORGANISM SPEC CULT: NORMAL
SERVICE CMNT-IMP: NORMAL
SERVICE CMNT-IMP: NORMAL
SPECIMEN DESCRIPTION: NORMAL
SPECIMEN DESCRIPTION: NORMAL

## 2023-10-07 ENCOUNTER — APPOINTMENT (OUTPATIENT)
Dept: CT IMAGING | Age: 78
DRG: 370 | End: 2023-10-07
Payer: MEDICARE

## 2023-10-07 ENCOUNTER — HOSPITAL ENCOUNTER (INPATIENT)
Age: 78
LOS: 4 days | Discharge: NURSING, REHAB OR LONG-TERM CARE | DRG: 370 | End: 2023-10-12
Attending: EMERGENCY MEDICINE | Admitting: STUDENT IN AN ORGANIZED HEALTH CARE EDUCATION/TRAINING PROGRAM
Payer: MEDICARE

## 2023-10-07 ENCOUNTER — APPOINTMENT (OUTPATIENT)
Dept: GENERAL RADIOLOGY | Age: 78
DRG: 370 | End: 2023-10-07
Payer: MEDICARE

## 2023-10-07 DIAGNOSIS — G62.81 CRITICAL ILLNESS POLYNEUROPATHY (HCC): ICD-10-CM

## 2023-10-07 DIAGNOSIS — K92.0 GASTROINTESTINAL HEMORRHAGE WITH HEMATEMESIS: Primary | ICD-10-CM

## 2023-10-07 DIAGNOSIS — F33.2 SEVERE RECURRENT MAJOR DEPRESSION WITHOUT PSYCHOTIC FEATURES (HCC): ICD-10-CM

## 2023-10-07 LAB
ABO + RH BLD: NORMAL
ALBUMIN SERPL-MCNC: 3.5 G/DL (ref 3.5–5.2)
ALBUMIN/GLOB SERPL: 0.9 {RATIO} (ref 1–2.5)
ALP SERPL-CCNC: 144 U/L (ref 35–104)
ALT SERPL-CCNC: 22 U/L (ref 5–33)
ANION GAP SERPL CALCULATED.3IONS-SCNC: 11 MMOL/L (ref 9–17)
ARM BAND NUMBER: NORMAL
AST SERPL-CCNC: 27 U/L
BASOPHILS # BLD: 0.03 K/UL (ref 0–0.2)
BASOPHILS NFR BLD: 1 % (ref 0–2)
BILIRUB SERPL-MCNC: 0.4 MG/DL (ref 0.3–1.2)
BILIRUB UR QL STRIP: NEGATIVE
BLOOD BANK SAMPLE EXPIRATION: NORMAL
BLOOD GROUP ANTIBODIES SERPL: NEGATIVE
BUN SERPL-MCNC: 9 MG/DL (ref 8–23)
CALCIUM SERPL-MCNC: 9.1 MG/DL (ref 8.6–10.4)
CASTS #/AREA URNS LPF: ABNORMAL /LPF (ref 0–2)
CASTS #/AREA URNS LPF: ABNORMAL /LPF (ref 0–2)
CHLORIDE SERPL-SCNC: 99 MMOL/L (ref 98–107)
CLARITY UR: CLEAR
CO2 SERPL-SCNC: 27 MMOL/L (ref 20–31)
COLOR UR: YELLOW
CREAT SERPL-MCNC: 0.8 MG/DL (ref 0.5–0.9)
EOSINOPHIL # BLD: 0.13 K/UL (ref 0–0.44)
EOSINOPHILS RELATIVE PERCENT: 2 % (ref 1–4)
EPI CELLS #/AREA URNS HPF: ABNORMAL /HPF (ref 0–5)
ERYTHROCYTE [DISTWIDTH] IN BLOOD BY AUTOMATED COUNT: 15.1 % (ref 11.8–14.4)
GFR SERPL CREATININE-BSD FRML MDRD: >60 ML/MIN/1.73M2
GLUCOSE SERPL-MCNC: 132 MG/DL (ref 70–99)
GLUCOSE UR STRIP-MCNC: NEGATIVE MG/DL
HCT VFR BLD AUTO: 40.3 % (ref 36.3–47.1)
HGB BLD-MCNC: 12.9 G/DL (ref 11.9–15.1)
HGB UR QL STRIP.AUTO: NEGATIVE
IMM GRANULOCYTES # BLD AUTO: 0.07 K/UL (ref 0–0.3)
IMM GRANULOCYTES NFR BLD: 1 %
INR PPP: 1.1
KETONES UR STRIP-MCNC: NEGATIVE MG/DL
LACTIC ACID, SEPSIS WHOLE BLOOD: 1.4 MMOL/L (ref 0.5–1.9)
LACTIC ACID, SEPSIS WHOLE BLOOD: 1.6 MMOL/L (ref 0.5–1.9)
LEUKOCYTE ESTERASE UR QL STRIP: ABNORMAL
LYMPHOCYTES NFR BLD: 0.73 K/UL (ref 1.1–3.7)
LYMPHOCYTES RELATIVE PERCENT: 13 % (ref 24–43)
MAGNESIUM SERPL-MCNC: 1.9 MG/DL (ref 1.6–2.6)
MCH RBC QN AUTO: 30.8 PG (ref 25.2–33.5)
MCHC RBC AUTO-ENTMCNC: 32 G/DL (ref 28.4–34.8)
MCV RBC AUTO: 96.2 FL (ref 82.6–102.9)
MONOCYTES NFR BLD: 0.42 K/UL (ref 0.1–1.2)
MONOCYTES NFR BLD: 8 % (ref 3–12)
NEUTROPHILS NFR BLD: 75 % (ref 36–65)
NEUTS SEG NFR BLD: 4.2 K/UL (ref 1.5–8.1)
NITRITE UR QL STRIP: NEGATIVE
NRBC BLD-RTO: 0 PER 100 WBC
PARTIAL THROMBOPLASTIN TIME: 31.9 SEC (ref 23–36.5)
PH UR STRIP: >9 [PH] (ref 5–8)
PLATELET # BLD AUTO: 203 K/UL (ref 138–453)
PMV BLD AUTO: 10.1 FL (ref 8.1–13.5)
POTASSIUM SERPL-SCNC: 3.8 MMOL/L (ref 3.7–5.3)
PROT SERPL-MCNC: 7.3 G/DL (ref 6.4–8.3)
PROT UR STRIP-MCNC: ABNORMAL MG/DL
PROTHROMBIN TIME: 13.8 SEC (ref 11.7–14.9)
RBC # BLD AUTO: 4.19 M/UL (ref 3.95–5.11)
RBC # BLD: ABNORMAL 10*6/UL
RBC #/AREA URNS HPF: ABNORMAL /HPF (ref 0–2)
REASON FOR REJECTION: NORMAL
SODIUM SERPL-SCNC: 137 MMOL/L (ref 135–144)
SP GR UR STRIP: 1.02 (ref 1–1.03)
SPECIMEN SOURCE: NORMAL
TROPONIN I SERPL HS-MCNC: 14 NG/L (ref 0–14)
TROPONIN I SERPL HS-MCNC: 16 NG/L (ref 0–14)
UROBILINOGEN UR STRIP-ACNC: NORMAL EU/DL (ref 0–1)
WBC #/AREA URNS HPF: ABNORMAL /HPF (ref 0–5)
WBC OTHER # BLD: 5.6 K/UL (ref 3.5–11.3)
YEAST URNS QL MICRO: ABNORMAL
ZZ NTE CLEAN UP: ORDERED TEST: NORMAL

## 2023-10-07 PROCEDURE — 87086 URINE CULTURE/COLONY COUNT: CPT

## 2023-10-07 PROCEDURE — 6360000002 HC RX W HCPCS

## 2023-10-07 PROCEDURE — 96376 TX/PRO/DX INJ SAME DRUG ADON: CPT

## 2023-10-07 PROCEDURE — 85014 HEMATOCRIT: CPT

## 2023-10-07 PROCEDURE — 96366 THER/PROPH/DIAG IV INF ADDON: CPT

## 2023-10-07 PROCEDURE — 93005 ELECTROCARDIOGRAM TRACING: CPT | Performed by: EMERGENCY MEDICINE

## 2023-10-07 PROCEDURE — A4216 STERILE WATER/SALINE, 10 ML: HCPCS

## 2023-10-07 PROCEDURE — G0378 HOSPITAL OBSERVATION PER HR: HCPCS

## 2023-10-07 PROCEDURE — 87040 BLOOD CULTURE FOR BACTERIA: CPT

## 2023-10-07 PROCEDURE — 86900 BLOOD TYPING SEROLOGIC ABO: CPT

## 2023-10-07 PROCEDURE — 85610 PROTHROMBIN TIME: CPT

## 2023-10-07 PROCEDURE — 85730 THROMBOPLASTIN TIME PARTIAL: CPT

## 2023-10-07 PROCEDURE — 74018 RADEX ABDOMEN 1 VIEW: CPT

## 2023-10-07 PROCEDURE — 85025 COMPLETE CBC W/AUTO DIFF WBC: CPT

## 2023-10-07 PROCEDURE — 83735 ASSAY OF MAGNESIUM: CPT

## 2023-10-07 PROCEDURE — 2580000003 HC RX 258: Performed by: INTERNAL MEDICINE

## 2023-10-07 PROCEDURE — 81001 URINALYSIS AUTO W/SCOPE: CPT

## 2023-10-07 PROCEDURE — 2580000003 HC RX 258

## 2023-10-07 PROCEDURE — 80053 COMPREHEN METABOLIC PANEL: CPT

## 2023-10-07 PROCEDURE — 71045 X-RAY EXAM CHEST 1 VIEW: CPT

## 2023-10-07 PROCEDURE — 83605 ASSAY OF LACTIC ACID: CPT

## 2023-10-07 PROCEDURE — 99285 EMERGENCY DEPT VISIT HI MDM: CPT

## 2023-10-07 PROCEDURE — 84484 ASSAY OF TROPONIN QUANT: CPT

## 2023-10-07 PROCEDURE — 36415 COLL VENOUS BLD VENIPUNCTURE: CPT

## 2023-10-07 PROCEDURE — 2580000003 HC RX 258: Performed by: EMERGENCY MEDICINE

## 2023-10-07 PROCEDURE — 6360000002 HC RX W HCPCS: Performed by: EMERGENCY MEDICINE

## 2023-10-07 PROCEDURE — 96375 TX/PRO/DX INJ NEW DRUG ADDON: CPT

## 2023-10-07 PROCEDURE — 96365 THER/PROPH/DIAG IV INF INIT: CPT

## 2023-10-07 PROCEDURE — 6360000004 HC RX CONTRAST MEDICATION: Performed by: EMERGENCY MEDICINE

## 2023-10-07 PROCEDURE — C9113 INJ PANTOPRAZOLE SODIUM, VIA: HCPCS

## 2023-10-07 PROCEDURE — 6370000000 HC RX 637 (ALT 250 FOR IP): Performed by: INTERNAL MEDICINE

## 2023-10-07 PROCEDURE — 71260 CT THORAX DX C+: CPT

## 2023-10-07 PROCEDURE — 6360000002 HC RX W HCPCS: Performed by: INTERNAL MEDICINE

## 2023-10-07 PROCEDURE — 86850 RBC ANTIBODY SCREEN: CPT

## 2023-10-07 PROCEDURE — 99222 1ST HOSP IP/OBS MODERATE 55: CPT | Performed by: INTERNAL MEDICINE

## 2023-10-07 PROCEDURE — 85018 HEMOGLOBIN: CPT

## 2023-10-07 PROCEDURE — 86901 BLOOD TYPING SEROLOGIC RH(D): CPT

## 2023-10-07 PROCEDURE — 96367 TX/PROPH/DG ADDL SEQ IV INF: CPT

## 2023-10-07 RX ORDER — SODIUM CHLORIDE 0.9 % (FLUSH) 0.9 %
5-40 SYRINGE (ML) INJECTION PRN
Status: DISCONTINUED | OUTPATIENT
Start: 2023-10-07 | End: 2023-10-12 | Stop reason: HOSPADM

## 2023-10-07 RX ORDER — GABAPENTIN 300 MG/1
300 CAPSULE ORAL 3 TIMES DAILY
Status: DISCONTINUED | OUTPATIENT
Start: 2023-10-07 | End: 2023-10-12 | Stop reason: HOSPADM

## 2023-10-07 RX ORDER — POLYETHYLENE GLYCOL 3350 17 G/17G
17 POWDER, FOR SOLUTION ORAL DAILY PRN
Status: DISCONTINUED | OUTPATIENT
Start: 2023-10-07 | End: 2023-10-12 | Stop reason: HOSPADM

## 2023-10-07 RX ORDER — TAMSULOSIN HYDROCHLORIDE 0.4 MG/1
0.4 CAPSULE ORAL DAILY
Status: DISCONTINUED | OUTPATIENT
Start: 2023-10-07 | End: 2023-10-12 | Stop reason: HOSPADM

## 2023-10-07 RX ORDER — CETIRIZINE HYDROCHLORIDE 10 MG/1
10 TABLET ORAL DAILY
Status: DISCONTINUED | OUTPATIENT
Start: 2023-10-07 | End: 2023-10-12 | Stop reason: HOSPADM

## 2023-10-07 RX ORDER — ALBUTEROL SULFATE 2.5 MG/3ML
2.5 SOLUTION RESPIRATORY (INHALATION) EVERY 6 HOURS PRN
Status: DISCONTINUED | OUTPATIENT
Start: 2023-10-07 | End: 2023-10-12 | Stop reason: HOSPADM

## 2023-10-07 RX ORDER — ACETAMINOPHEN 650 MG/1
650 SUPPOSITORY RECTAL EVERY 6 HOURS PRN
Status: DISCONTINUED | OUTPATIENT
Start: 2023-10-07 | End: 2023-10-12 | Stop reason: HOSPADM

## 2023-10-07 RX ORDER — LANOLIN ALCOHOL/MO/W.PET/CERES
325 CREAM (GRAM) TOPICAL
Status: DISCONTINUED | OUTPATIENT
Start: 2023-10-08 | End: 2023-10-12 | Stop reason: HOSPADM

## 2023-10-07 RX ORDER — SODIUM CHLORIDE 9 MG/ML
INJECTION, SOLUTION INTRAVENOUS CONTINUOUS
Status: DISCONTINUED | OUTPATIENT
Start: 2023-10-07 | End: 2023-10-09

## 2023-10-07 RX ORDER — OXYCODONE HYDROCHLORIDE AND ACETAMINOPHEN 5; 325 MG/1; MG/1
1 TABLET ORAL EVERY 4 HOURS PRN
Status: ON HOLD | COMMUNITY
End: 2023-10-09 | Stop reason: SDUPTHER

## 2023-10-07 RX ORDER — FLUTICASONE PROPIONATE 50 MCG
1 SPRAY, SUSPENSION (ML) NASAL DAILY
Status: DISCONTINUED | OUTPATIENT
Start: 2023-10-07 | End: 2023-10-12 | Stop reason: HOSPADM

## 2023-10-07 RX ORDER — BUSPIRONE HYDROCHLORIDE 10 MG/1
10 TABLET ORAL 2 TIMES DAILY
Status: DISCONTINUED | OUTPATIENT
Start: 2023-10-07 | End: 2023-10-12 | Stop reason: HOSPADM

## 2023-10-07 RX ORDER — BENZTROPINE MESYLATE 0.5 MG/1
0.5 TABLET ORAL 2 TIMES DAILY
Status: DISCONTINUED | OUTPATIENT
Start: 2023-10-07 | End: 2023-10-12 | Stop reason: HOSPADM

## 2023-10-07 RX ORDER — FENTANYL CITRATE 50 UG/ML
25 INJECTION, SOLUTION INTRAMUSCULAR; INTRAVENOUS ONCE
Status: COMPLETED | OUTPATIENT
Start: 2023-10-07 | End: 2023-10-07

## 2023-10-07 RX ORDER — SODIUM CHLORIDE 9 MG/ML
INJECTION, SOLUTION INTRAVENOUS PRN
Status: DISCONTINUED | OUTPATIENT
Start: 2023-10-07 | End: 2023-10-12 | Stop reason: HOSPADM

## 2023-10-07 RX ORDER — ONDANSETRON 4 MG/1
4 TABLET, ORALLY DISINTEGRATING ORAL EVERY 8 HOURS PRN
Status: DISCONTINUED | OUTPATIENT
Start: 2023-10-07 | End: 2023-10-12 | Stop reason: HOSPADM

## 2023-10-07 RX ORDER — OXYCODONE HYDROCHLORIDE AND ACETAMINOPHEN 5; 325 MG/1; MG/1
1 TABLET ORAL EVERY 4 HOURS PRN
Status: DISCONTINUED | OUTPATIENT
Start: 2023-10-07 | End: 2023-10-12 | Stop reason: HOSPADM

## 2023-10-07 RX ORDER — ONDANSETRON 2 MG/ML
4 INJECTION INTRAMUSCULAR; INTRAVENOUS ONCE
Status: COMPLETED | OUTPATIENT
Start: 2023-10-07 | End: 2023-10-07

## 2023-10-07 RX ORDER — ACETAMINOPHEN 325 MG/1
650 TABLET ORAL EVERY 6 HOURS PRN
Status: DISCONTINUED | OUTPATIENT
Start: 2023-10-07 | End: 2023-10-12 | Stop reason: HOSPADM

## 2023-10-07 RX ORDER — QUETIAPINE FUMARATE 100 MG/1
100 TABLET, FILM COATED ORAL DAILY
Status: DISCONTINUED | OUTPATIENT
Start: 2023-10-07 | End: 2023-10-12 | Stop reason: HOSPADM

## 2023-10-07 RX ORDER — METOPROLOL SUCCINATE 25 MG/1
25 TABLET, EXTENDED RELEASE ORAL DAILY
Status: DISCONTINUED | OUTPATIENT
Start: 2023-10-07 | End: 2023-10-12 | Stop reason: HOSPADM

## 2023-10-07 RX ORDER — SODIUM CHLORIDE 0.9 % (FLUSH) 0.9 %
5-40 SYRINGE (ML) INJECTION EVERY 12 HOURS SCHEDULED
Status: DISCONTINUED | OUTPATIENT
Start: 2023-10-07 | End: 2023-10-12 | Stop reason: HOSPADM

## 2023-10-07 RX ORDER — ATORVASTATIN CALCIUM 40 MG/1
40 TABLET, FILM COATED ORAL NIGHTLY
Status: DISCONTINUED | OUTPATIENT
Start: 2023-10-07 | End: 2023-10-12 | Stop reason: HOSPADM

## 2023-10-07 RX ORDER — ONDANSETRON 2 MG/ML
4 INJECTION INTRAMUSCULAR; INTRAVENOUS EVERY 6 HOURS PRN
Status: DISCONTINUED | OUTPATIENT
Start: 2023-10-07 | End: 2023-10-12 | Stop reason: HOSPADM

## 2023-10-07 RX ORDER — AMITRIPTYLINE HYDROCHLORIDE 25 MG/1
25 TABLET, FILM COATED ORAL 3 TIMES DAILY
Status: DISCONTINUED | OUTPATIENT
Start: 2023-10-07 | End: 2023-10-12 | Stop reason: HOSPADM

## 2023-10-07 RX ORDER — CLONAZEPAM 0.5 MG/1
0.5 TABLET ORAL 3 TIMES DAILY PRN
Status: DISCONTINUED | OUTPATIENT
Start: 2023-10-07 | End: 2023-10-12 | Stop reason: HOSPADM

## 2023-10-07 RX ORDER — METOCLOPRAMIDE HYDROCHLORIDE 5 MG/ML
10 INJECTION INTRAMUSCULAR; INTRAVENOUS ONCE
Status: COMPLETED | OUTPATIENT
Start: 2023-10-07 | End: 2023-10-07

## 2023-10-07 RX ORDER — ONDANSETRON 2 MG/ML
INJECTION INTRAMUSCULAR; INTRAVENOUS
Status: COMPLETED
Start: 2023-10-07 | End: 2023-10-07

## 2023-10-07 RX ORDER — ESCITALOPRAM OXALATE 10 MG/1
20 TABLET ORAL DAILY
Status: DISCONTINUED | OUTPATIENT
Start: 2023-10-07 | End: 2023-10-12 | Stop reason: HOSPADM

## 2023-10-07 RX ORDER — TRAZODONE HYDROCHLORIDE 100 MG/1
100 TABLET ORAL NIGHTLY
Status: DISCONTINUED | OUTPATIENT
Start: 2023-10-07 | End: 2023-10-12 | Stop reason: HOSPADM

## 2023-10-07 RX ORDER — CHOLECALCIFEROL (VITAMIN D3) 125 MCG
1000 CAPSULE ORAL DAILY
Status: DISCONTINUED | OUTPATIENT
Start: 2023-10-07 | End: 2023-10-12 | Stop reason: HOSPADM

## 2023-10-07 RX ORDER — METOCLOPRAMIDE HYDROCHLORIDE 5 MG/ML
INJECTION INTRAMUSCULAR; INTRAVENOUS
Status: COMPLETED
Start: 2023-10-07 | End: 2023-10-07

## 2023-10-07 RX ORDER — LACTOBACILLUS RHAMNOSUS GG 10B CELL
1 CAPSULE ORAL 2 TIMES DAILY
Status: DISCONTINUED | OUTPATIENT
Start: 2023-10-07 | End: 2023-10-12 | Stop reason: HOSPADM

## 2023-10-07 RX ORDER — DIPHENHYDRAMINE HYDROCHLORIDE 50 MG/ML
25 INJECTION INTRAMUSCULAR; INTRAVENOUS ONCE
Status: COMPLETED | OUTPATIENT
Start: 2023-10-07 | End: 2023-10-07

## 2023-10-07 RX ADMIN — OXYCODONE AND ACETAMINOPHEN 1 TABLET: 5; 325 TABLET ORAL at 20:04

## 2023-10-07 RX ADMIN — IOPAMIDOL 75 ML: 755 INJECTION, SOLUTION INTRAVENOUS at 14:05

## 2023-10-07 RX ADMIN — BUSPIRONE HYDROCHLORIDE 10 MG: 10 TABLET ORAL at 21:12

## 2023-10-07 RX ADMIN — TRAZODONE HYDROCHLORIDE 100 MG: 100 TABLET ORAL at 21:12

## 2023-10-07 RX ADMIN — Medication 1000 MCG: at 20:20

## 2023-10-07 RX ADMIN — CEFTRIAXONE SODIUM 1000 MG: 1 INJECTION, POWDER, FOR SOLUTION INTRAMUSCULAR; INTRAVENOUS at 13:27

## 2023-10-07 RX ADMIN — FENTANYL CITRATE 25 MCG: 50 INJECTION INTRAMUSCULAR; INTRAVENOUS at 14:47

## 2023-10-07 RX ADMIN — METOCLOPRAMIDE 10 MG: 5 INJECTION, SOLUTION INTRAMUSCULAR; INTRAVENOUS at 12:56

## 2023-10-07 RX ADMIN — SODIUM CHLORIDE: 9 INJECTION, SOLUTION INTRAVENOUS at 20:08

## 2023-10-07 RX ADMIN — PANTOPRAZOLE SODIUM 8 MG/HR: 40 INJECTION, POWDER, FOR SOLUTION INTRAVENOUS at 16:50

## 2023-10-07 RX ADMIN — ESCITALOPRAM 20 MG: 10 TABLET, FILM COATED ORAL at 20:21

## 2023-10-07 RX ADMIN — ATORVASTATIN CALCIUM 40 MG: 80 TABLET, FILM COATED ORAL at 21:12

## 2023-10-07 RX ADMIN — METOPROLOL SUCCINATE 25 MG: 25 TABLET, FILM COATED, EXTENDED RELEASE ORAL at 20:21

## 2023-10-07 RX ADMIN — CETIRIZINE HYDROCHLORIDE 10 MG: 10 TABLET ORAL at 20:25

## 2023-10-07 RX ADMIN — Medication 1 CAPSULE: at 21:12

## 2023-10-07 RX ADMIN — DIPHENHYDRAMINE HYDROCHLORIDE 25 MG: 50 INJECTION INTRAMUSCULAR; INTRAVENOUS at 12:55

## 2023-10-07 RX ADMIN — METOCLOPRAMIDE 10 MG: 5 INJECTION, SOLUTION INTRAMUSCULAR; INTRAVENOUS at 14:21

## 2023-10-07 RX ADMIN — AMITRIPTYLINE HYDROCHLORIDE 25 MG: 25 TABLET, FILM COATED ORAL at 21:12

## 2023-10-07 RX ADMIN — AZITHROMYCIN MONOHYDRATE 500 MG: 500 INJECTION, POWDER, LYOPHILIZED, FOR SOLUTION INTRAVENOUS at 14:39

## 2023-10-07 RX ADMIN — SODIUM CHLORIDE 80 MG: 9 INJECTION INTRAMUSCULAR; INTRAVENOUS; SUBCUTANEOUS at 15:49

## 2023-10-07 RX ADMIN — TAMSULOSIN HYDROCHLORIDE 0.4 MG: 0.4 CAPSULE ORAL at 20:21

## 2023-10-07 RX ADMIN — ONDANSETRON 4 MG: 2 INJECTION INTRAMUSCULAR; INTRAVENOUS at 14:22

## 2023-10-07 RX ADMIN — METOCLOPRAMIDE HYDROCHLORIDE 10 MG: 5 INJECTION INTRAMUSCULAR; INTRAVENOUS at 14:21

## 2023-10-07 RX ADMIN — GABAPENTIN 300 MG: 300 CAPSULE ORAL at 21:12

## 2023-10-07 RX ADMIN — QUETIAPINE FUMARATE 100 MG: 100 TABLET ORAL at 20:21

## 2023-10-07 RX ADMIN — ONDANSETRON 4 MG: 2 INJECTION INTRAMUSCULAR; INTRAVENOUS at 20:04

## 2023-10-07 RX ADMIN — BENZTROPINE MESYLATE 0.5 MG: 0.5 TABLET ORAL at 21:12

## 2023-10-07 ASSESSMENT — ENCOUNTER SYMPTOMS
NAUSEA: 1
CHEST TIGHTNESS: 0
ABDOMINAL DISTENTION: 0
VOMITING: 1
ABDOMINAL PAIN: 1
SHORTNESS OF BREATH: 0
COUGH: 0

## 2023-10-07 ASSESSMENT — PAIN SCALES - GENERAL
PAINLEVEL_OUTOF10: 7
PAINLEVEL_OUTOF10: 9
PAINLEVEL_OUTOF10: 9

## 2023-10-07 ASSESSMENT — PAIN DESCRIPTION - LOCATION: LOCATION: ABDOMEN

## 2023-10-07 ASSESSMENT — PAIN - FUNCTIONAL ASSESSMENT: PAIN_FUNCTIONAL_ASSESSMENT: 0-10

## 2023-10-07 ASSESSMENT — PAIN DESCRIPTION - DESCRIPTORS: DESCRIPTORS: ACHING

## 2023-10-07 NOTE — ED NOTES
Pt provided mouth swab. Pt sitting up on cot, rr even and non labored, no distress noted. Nausea is better controlled at this time, no active vomiting noted. Son at bedside.       Summer Sommers RN  10/07/23 1240

## 2023-10-07 NOTE — ED NOTES
The following labs were labeled with appropriate pt sticker and tubed to lab:     [] Blue     [] Lavender   [] on ice  [] Green/yellow  [x] Green/black [] on ice  [] Raul Dark  [] on ice  [] Yellow  [] Red  [] Pink  [] Type/ Screen  [] ABG  [] VBG    [] COVID-19 swab    [] Rapid  [] PCR  [] Flu swab  [] Peds Viral Panel     [] Urine Sample  [] Fecal Sample  [] Pelvic Cultures  [] Blood Cultures  [] X 2  [] STREP Cultures      Andrez Granados RN  10/07/23 9858

## 2023-10-07 NOTE — ED PROVIDER NOTES
[address] [place] [address] ED  Emergency Department Encounter  Emergency Medicine Resident     Pt Name:Erin Espinosa  MRN: [id number]  [facility] 1945  Date of evaluation: 10/7/23  PCP:  No primary care provider on file. Note Started: 12:59 PM EDT      CHIEF COMPLAINT       Chief Complaint   Patient presents with    Emesis    Abdominal Pain       HISTORY OF PRESENT ILLNESS  (Location/Symptom, Timing/Onset, Context/Setting, Quality, Duration, Modifying Factors, Severity.)      Carlos Edwards is a 66 y.o. female who presents with abdominal pain and emesis. The patient has a history of UTI and pneumonia, for which she was recently discharged from the hospital, as well as a history of colitis and GI bleed previously. She developed this new onset vomiting earlier this morning, with severe abdominal pain and tenderness in the epigastric region, she has since been vomiting continuously with no relief after zofran. She has had no associated chest pain, and is unsure whether she has had any fever or chills in the past few days. The vomitus has been coffee ground in character, but with no blood or bile. There is no known history of use of anticoagulants or aspirin.     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, Allergic rhinitis, Anemia, unspecified, Anxiety, Anxiety disorder, Arthritis, Atrial fibrillation (HCC), Back pain, Bronchitis, Caffeine use, Cardiomegaly, Carpal tunnel syndrome, Cataracts, bilateral, Centriacinar emphysema (HCC), Chronic kidney disease, stage III (moderate) ([facility]), Chronic pain, Constipation, Constipation, COPD (chronic obstructive pulmonary disease) ([address]), Cystitis with hematuria, Depression, Diaphragmatic hernia without obstruction or gangrene, Diarrhea, Difficulty walking, Dry eye syndrome of the possibility of ischemia less likely [WK]   1320 CBC no wbc elevation normal H and H but may not have fallen in a high volume bleed [WK]   1356 XR CHEST PORTABLE  No evidence of free air under diaphragm, possible opacification [FS]   1356 Urinalysis with Microscopic(!):    Color, UA Yellow   Turbidity UA Clear   Glucose, UA NEGATIVE   Bilirubin, Urine NEGATIVE   Ketones, Urine NEGATIVE   Specific [place], UA 1.016   Urine Hgb NEGATIVE   pH, UA >9.0(!)   Protein, UA NEGATIVE  Verified by sulfosalicylic acid test.(!)   Urobilinogen, Urine Normal   Nitrite, Urine NEGATIVE   Leukocyte Esterase, Urine TRACE(!)   WBC, UA 10 TO 20   RBC, UA 0 TO 2   Casts UA HYALINE   Casts UA 2 TO 5   Epithelial Cells, UA 0 TO 2   Yeast, Urine FEW(!)  UA largely unremarkable [FS]   1426 IMPRESSION:  New opacification in the lateral aspect of the left lung base which may  represent pneumonia. [WK]   1426 Abdomen X ray  IMPRESSION:  No evidence of pneumoperitoneum.    [WK]   1534 GI consulted [FS]   9689 CT CHEST ABDOMEN PELVIS W CONTRAST Additional Contrast? None  IMPRESSION:  Moderate hiatal hernia and lower esophagus is patulous containing fluid. Consider follow-up GI consultation     3 cm spiculated and lobulated soft tissue mass in the left breast.  Findings  are suspicious for malignancy. Please refer to recent mammogram and sonogram  report of 08/31/2023     No intra-abdominal or intrapelvic abnormalities are noted. [FS]      ED Course User Index  [FS] Ronni Cortes MD  [WK] Catherene Blizzard, DO       PROCEDURES:  None    CONSULTS:  IP CONSULT TO GI  IP CONSULT TO INTERNAL MEDICINE    CRITICAL CARE:  There was significant risk of life threatening deterioration of patient's condition requiring my direct management. Critical care time 20 minutes, excluding any documented procedures. FINAL IMPRESSION      1.  Gastrointestinal hemorrhage with hematemesis          DISPOSITION / PLAN     DISPOSITION    Admission    PATIENT REFERRED

## 2023-10-07 NOTE — ED NOTES
Pt given 4 mg of Zofran and 10 mg of Reglan per Dr. Keagan Manley via verbal order. Pt is actively vomiting in the CT scanner, delaying care. Dr. Keagan Manley is attempting to place orders. Writer will back time meds when they are ordered.      Anne-Marie Davison RN  10/07/23 9144

## 2023-10-07 NOTE — ED NOTES
Pt having chest pain, discomfort. Pt asking for pain medication. Dr. Menard Graft notified.       Fatemeh Winston RN  10/07/23 0054

## 2023-10-07 NOTE — ED TRIAGE NOTES
Pt presents to ED from home via [facility] for nausea and vomiting, worsening since 0500. Pt also reports not having a bowel movement as well since she was last here at the hospital. Pt denies diarrhea, LOC, CP, SOB, fever, chills, injury/trauma, or any other sx. Pt was given 4 of Zofran and then an additional 4 of Zofran for a total of 8 mg  PTA, with no relief of sx. Pt is A&OX4, placed on full monitor, EKG done, IV established, changed into gown and given warm blankets. Labs drawn, labeled, and sent to lab. Pt vitals show HTN but otherwise WNL. Blood cultures obtained and labeled and sent to lab. Pt type and screen is completed. Pt has very dark coffee ground like emesis.

## 2023-10-07 NOTE — ED NOTES
ED to inpatient nurses report      Chief Complaint:  Chief Complaint   Patient presents with    Emesis    Abdominal Pain     Present to ED from: [facility]:     LOC: alert and orientated to name, place, date  Mobility: Requires assistance * 2  Oxygen Baseline: 0L    Current needs required: 0L   Pending ED orders: none  Present condition: stable    Why did the patient come to the ED? Pt presents to ED from home via [address] for nausea and vomiting, and reports not having a bowel movement as well since she was last here at the hospital.  What is the plan? manage pain. Any procedures or intervention occur? Labs, EKG, CT, blood cultures, IV antibiotics.      Mental Status:  Level of Consciousness: Alert (0)    Psych Assessment:   Psychosocial  Psychosocial (WDL): Within Defined Limits  Vital signs   Vitals:    10/07/23 1341 10/07/23 1342 10/07/23 1343 10/07/23 1344   BP:       Pulse: 96 96 96 95   Resp: 20 20 15 12   Temp:       TempSrc:       SpO2: 99% 99% 99% 99%   Weight:       Height:            Vitals:  Patient Vitals for the past 24 hrs:   BP Temp Temp src Pulse Resp SpO2 Height Weight   10/07/23 1344 -- -- -- 95 12 99 % -- --   10/07/23 1343 -- -- -- 96 15 99 % -- --   10/07/23 1342 -- -- -- 96 20 99 % -- --   10/07/23 1341 -- -- -- 96 20 99 % -- --   10/07/23 1340 -- -- -- 97 17 98 % -- --   10/07/23 1339 -- -- -- 98 18 98 % -- --   10/07/23 1338 -- -- -- 97 23 99 % -- --   10/07/23 1337 -- -- -- 96 15 99 % -- --   10/07/23 1336 -- -- -- 97 15 99 % -- --   10/07/23 1335 -- -- -- 97 19 99 % -- --   10/07/23 1334 -- -- -- 98 13 99 % -- --   10/07/23 1333 -- -- -- 98 14 99 % -- --   10/07/23 1332 -- -- -- 98 13 99 % -- --   10/07/23 1331 -- -- -- 98 14 99 % -- --   10/07/23 1330 (!) 166/77 -- -- 98 16 99 % -- --   10/07/23 1329 -- -- -- 99 15 99 % -- --   10/07/23 1328 -- -- -- 98 14 98 % -- --   10/07/23 1327 -- -- -- 98 17 99 % -- --   10/07/23 1326 -- -- -- 99 14 99 % -- --   10/07/23 1325 -- -- --

## 2023-10-07 NOTE — ED NOTES
[facility] Staff updated on pt status, all questions answered at this time.       Jessica Tellez RN  10/07/23 0215

## 2023-10-07 NOTE — H&P
fluticasone (FLONASE) 50 MCG/ACT nasal spray 1 spray by Each Nostril route daily    Devante Jeong MD   loratadine (CLARITIN) 10 MG capsule Take 1 capsule by mouth daily    Devante Jeong MD   polyethylene glycol (GLYCOLAX) 17 g packet Take 17 g by mouth daily as needed for Constipation 12/19/21   Zhang Agrawal MD   Folic Acid-Cholecalciferol 1-2000 MG-UNIT TABS Take by mouth    Devante Jeong MD   busPIRone (BUSPAR) 10 MG tablet Take 1 tablet by mouth 2 times daily 11/30/21   Jessica Meneses MD   ferrous sulfate (FE TABS 325) 325 (65 Fe) MG EC tablet Take 1 tablet by mouth daily (with breakfast) 12/16/20   MARGOTH Zamora - NP        Allergies:     Prednisone, Compazine [prochlorperazine maleate], Penicillin v potassium, and Penicillins    Social History:     Tobacco:    reports that she has quit smoking. Her smoking use included cigarettes. She has a 50.00 pack-year smoking history. She has never used smokeless tobacco.  Alcohol:      reports no history of alcohol use. Drug Use:  reports no history of drug use. Family History:     Family History   Problem Relation Age of Onset    Cancer Mother         uterine    Heart Attack Mother     Heart Attack Father     Other Maternal Grandfather         foot gangrene    Other Paternal Grandmother         bleeding ulcers    Other Paternal Grandfather         lung cancer       Review of Systems:     Positive and Negative as described in HPI.     CONSTITUTIONAL:  negative for fevers, chills, sweats, fatigue, weight loss  HEENT:  negative for vision, hearing changes, runny nose, throat pain  RESPIRATORY:  negative for shortness of breath, cough, congestion, wheezing  CARDIOVASCULAR:  negative for chest pain, palpitations  GASTROINTESTINAL:  negative for diarrhea, constipation, change in bowel habits  GENITOURINARY:  negative for difficulty of urination, burning with urination, frequency   INTEGUMENT:  negative for rash, skin lesions, easy 10/07/23  3:13 PM   Result Value Ref Range    Troponin, High Sensitivity 14 0 - 14 ng/L   SPECIMEN REJECTION    Collection Time: 10/07/23  4:14 PM   Result Value Ref Range    Specimen Source . BLOOD     Ordered Test LACDS     Reason for Rejection Unable to perform testing: Specimen clotted. Lactate, Sepsis    Collection Time: 10/07/23  4:32 PM   Result Value Ref Range    Lactic Acid, Sepsis, Whole Blood 1.4 0.5 - 1.9 mmol/L       Imaging/Diagnostics:  CT CHEST ABDOMEN PELVIS W CONTRAST Additional Contrast? None    Result Date: 10/7/2023  Moderate hiatal hernia and lower esophagus is patulous containing fluid. Consider follow-up GI consultation 3 cm spiculated and lobulated soft tissue mass in the left breast.  Findings are suspicious for malignancy. Please refer to recent mammogram and sonogram report of 08/31/2023 No intra-abdominal or intrapelvic abnormalities are noted. XR ABDOMEN (KUB) (SINGLE AP VIEW)    Result Date: 10/7/2023  No evidence of pneumoperitoneum. XR CHEST PORTABLE    Result Date: 10/7/2023  New opacification in the lateral aspect of the left lung base which may represent pneumonia. Assessment :      Hospital Problems             Last Modified POA    * (Principal) Coffee ground emesis 10/7/2023 Yes    COPD (chronic obstructive pulmonary disease) ([address]) 10/7/2023 Yes    H/O gastric ulcer 10/7/2023 Yes    Essential hypertension 10/7/2023 Yes       Plan:     Patient status observation in the Med/Surge    GI consult  Serial h/h  ER started protonix drip  No need for ongoing antibiotics-low suspicion for sepsis; just completed course of Invanz for uti  Anticipate dc back to ecf 24h    Consultations:   [facility] Blood, DO  10/7/2023  5:42 PM    Copy sent to Dr. Yelena Wick primary care provider on file.

## 2023-10-08 PROBLEM — R10.9 ABDOMINAL PAIN: Status: ACTIVE | Noted: 2023-10-08

## 2023-10-08 LAB
HCT VFR BLD AUTO: 34 % (ref 36.3–47.1)
HCT VFR BLD AUTO: 34.6 % (ref 36.3–47.1)
HCT VFR BLD AUTO: 34.7 % (ref 36.3–47.1)
HCT VFR BLD AUTO: 35.9 % (ref 36.3–47.1)
HGB BLD-MCNC: 10.6 G/DL (ref 11.9–15.1)
HGB BLD-MCNC: 10.8 G/DL (ref 11.9–15.1)
HGB BLD-MCNC: 10.9 G/DL (ref 11.9–15.1)
HGB BLD-MCNC: 11.1 G/DL (ref 11.9–15.1)
MICROORGANISM SPEC CULT: NORMAL
SPECIMEN DESCRIPTION: NORMAL

## 2023-10-08 PROCEDURE — 99221 1ST HOSP IP/OBS SF/LOW 40: CPT | Performed by: NURSE PRACTITIONER

## 2023-10-08 PROCEDURE — 6370000000 HC RX 637 (ALT 250 FOR IP): Performed by: INTERNAL MEDICINE

## 2023-10-08 PROCEDURE — 6360000002 HC RX W HCPCS: Performed by: INTERNAL MEDICINE

## 2023-10-08 PROCEDURE — 99232 SBSQ HOSP IP/OBS MODERATE 35: CPT | Performed by: STUDENT IN AN ORGANIZED HEALTH CARE EDUCATION/TRAINING PROGRAM

## 2023-10-08 PROCEDURE — 96376 TX/PRO/DX INJ SAME DRUG ADON: CPT

## 2023-10-08 PROCEDURE — 1200000000 HC SEMI PRIVATE

## 2023-10-08 PROCEDURE — 36415 COLL VENOUS BLD VENIPUNCTURE: CPT

## 2023-10-08 PROCEDURE — 2580000003 HC RX 258: Performed by: INTERNAL MEDICINE

## 2023-10-08 PROCEDURE — C9113 INJ PANTOPRAZOLE SODIUM, VIA: HCPCS | Performed by: INTERNAL MEDICINE

## 2023-10-08 PROCEDURE — 85018 HEMOGLOBIN: CPT

## 2023-10-08 PROCEDURE — 85014 HEMATOCRIT: CPT

## 2023-10-08 RX ADMIN — PANTOPRAZOLE SODIUM 8 MG/HR: 40 INJECTION, POWDER, FOR SOLUTION INTRAVENOUS at 11:53

## 2023-10-08 RX ADMIN — GABAPENTIN 300 MG: 300 CAPSULE ORAL at 10:03

## 2023-10-08 RX ADMIN — CLONAZEPAM 0.5 MG: 1 TABLET ORAL at 16:15

## 2023-10-08 RX ADMIN — Medication 1 CAPSULE: at 20:48

## 2023-10-08 RX ADMIN — METOPROLOL SUCCINATE 25 MG: 25 TABLET, FILM COATED, EXTENDED RELEASE ORAL at 10:03

## 2023-10-08 RX ADMIN — Medication 1 CAPSULE: at 10:03

## 2023-10-08 RX ADMIN — QUETIAPINE FUMARATE 100 MG: 100 TABLET ORAL at 10:03

## 2023-10-08 RX ADMIN — HYPROMELLOSE 2910 1 DROP: 5 SOLUTION/ DROPS OPHTHALMIC at 20:48

## 2023-10-08 RX ADMIN — SODIUM CHLORIDE: 9 INJECTION, SOLUTION INTRAVENOUS at 17:54

## 2023-10-08 RX ADMIN — OXYCODONE AND ACETAMINOPHEN 1 TABLET: 5; 325 TABLET ORAL at 09:56

## 2023-10-08 RX ADMIN — Medication 1000 MCG: at 10:02

## 2023-10-08 RX ADMIN — AMITRIPTYLINE HYDROCHLORIDE 25 MG: 25 TABLET, FILM COATED ORAL at 20:47

## 2023-10-08 RX ADMIN — BUSPIRONE HYDROCHLORIDE 10 MG: 10 TABLET ORAL at 10:04

## 2023-10-08 RX ADMIN — PANTOPRAZOLE SODIUM 8 MG/HR: 40 INJECTION, POWDER, FOR SOLUTION INTRAVENOUS at 20:25

## 2023-10-08 RX ADMIN — BUSPIRONE HYDROCHLORIDE 10 MG: 10 TABLET ORAL at 20:51

## 2023-10-08 RX ADMIN — ONDANSETRON 4 MG: 2 INJECTION INTRAMUSCULAR; INTRAVENOUS at 09:44

## 2023-10-08 RX ADMIN — SODIUM CHLORIDE, PRESERVATIVE FREE 10 ML: 5 INJECTION INTRAVENOUS at 10:20

## 2023-10-08 RX ADMIN — BENZTROPINE MESYLATE 0.5 MG: 0.5 TABLET ORAL at 10:04

## 2023-10-08 RX ADMIN — ONDANSETRON 4 MG: 2 INJECTION INTRAMUSCULAR; INTRAVENOUS at 16:14

## 2023-10-08 RX ADMIN — TRAZODONE HYDROCHLORIDE 100 MG: 100 TABLET ORAL at 20:47

## 2023-10-08 RX ADMIN — PANTOPRAZOLE SODIUM 8 MG/HR: 40 INJECTION, POWDER, FOR SOLUTION INTRAVENOUS at 01:43

## 2023-10-08 RX ADMIN — ONDANSETRON 4 MG: 2 INJECTION INTRAMUSCULAR; INTRAVENOUS at 03:59

## 2023-10-08 RX ADMIN — TAMSULOSIN HYDROCHLORIDE 0.4 MG: 0.4 CAPSULE ORAL at 10:02

## 2023-10-08 RX ADMIN — ESCITALOPRAM 20 MG: 10 TABLET, FILM COATED ORAL at 10:03

## 2023-10-08 RX ADMIN — ATORVASTATIN CALCIUM 40 MG: 80 TABLET, FILM COATED ORAL at 20:47

## 2023-10-08 RX ADMIN — GABAPENTIN 300 MG: 300 CAPSULE ORAL at 16:20

## 2023-10-08 RX ADMIN — CLONAZEPAM 0.5 MG: 1 TABLET ORAL at 09:57

## 2023-10-08 RX ADMIN — AMITRIPTYLINE HYDROCHLORIDE 25 MG: 25 TABLET, FILM COATED ORAL at 10:04

## 2023-10-08 RX ADMIN — FERROUS SULFATE TAB EC 325 MG (65 MG FE EQUIVALENT) 325 MG: 325 (65 FE) TABLET DELAYED RESPONSE at 10:03

## 2023-10-08 RX ADMIN — OXYCODONE AND ACETAMINOPHEN 1 TABLET: 5; 325 TABLET ORAL at 04:00

## 2023-10-08 RX ADMIN — CETIRIZINE HYDROCHLORIDE 10 MG: 10 TABLET ORAL at 10:04

## 2023-10-08 RX ADMIN — OXYCODONE AND ACETAMINOPHEN 1 TABLET: 5; 325 TABLET ORAL at 16:14

## 2023-10-08 RX ADMIN — GABAPENTIN 300 MG: 300 CAPSULE ORAL at 20:47

## 2023-10-08 RX ADMIN — AMITRIPTYLINE HYDROCHLORIDE 25 MG: 25 TABLET, FILM COATED ORAL at 16:20

## 2023-10-08 RX ADMIN — BENZTROPINE MESYLATE 0.5 MG: 0.5 TABLET ORAL at 20:47

## 2023-10-08 ASSESSMENT — ENCOUNTER SYMPTOMS
COLOR CHANGE: 0
APNEA: 0
NAUSEA: 1
ABDOMINAL PAIN: 0
EYE DISCHARGE: 0
ABDOMINAL DISTENTION: 1
BACK PAIN: 0
EYE ITCHING: 0
DIARRHEA: 0
CONSTIPATION: 0
CHEST TIGHTNESS: 0
FACIAL SWELLING: 0

## 2023-10-08 ASSESSMENT — PAIN DESCRIPTION - DESCRIPTORS
DESCRIPTORS: ACHING

## 2023-10-08 ASSESSMENT — PAIN DESCRIPTION - ORIENTATION
ORIENTATION: LEFT
ORIENTATION: RIGHT;LEFT

## 2023-10-08 ASSESSMENT — PAIN SCALES - GENERAL
PAINLEVEL_OUTOF10: 7
PAINLEVEL_OUTOF10: 1

## 2023-10-08 ASSESSMENT — PAIN DESCRIPTION - LOCATION
LOCATION: ABDOMEN

## 2023-10-08 NOTE — CONSULTS
[facility] ([facility]) Gastroenterology  Consultation Note     . Chief Complaint:  Nausea vomiting  Abdominal pain    Reason for consult:    Nausea vomiting  Abdominal pain    History of present illness: This is a 66 y.o. female known to the GI service here at [address] due to multiple frequent admissions for nausea, vomiting most recently seen 9/29/2023. Patient has history of Nissen fundoplication performed by Dr. Deonte Macias several years ago and since then has had multiple admissions for nausea and vomiting. At one point patient was on Reglan due to started having gastroparesis. Often with supportive care symptoms improved resolved patient is discharged back to [facility]. At baseline patient is extremely anxious, nervous, and tearful. Patient also has significant GI history of gastric ulcer and G00. Patient also has had multiple UTIs. Patient was also recently seen at [facility] for similar complaintsOn 9/7/2023 per Dr. Abbie Leroy  Patient presents now with complaints of nausea vomiting and epigastric pain that started early yesterday morning. Patient reports chills but denies any kinds of fever. No recent weight loss. Patient denies any dysphagia or odynophagia. In the ER patient had CT chest abdomen and pelvis with IV contrast that showed moderate hiatal hernia and lower esophagus is patulous containing fluid. Left breast soft tissue mass 3 cm-findings suspicious for malignancy  BMP unremarkable  CBC unremarkable  LFTs with chronically elevated alk phos of 144    Previous GI history:   10/2022 ED per   Findings:     Retropharyngeal area was grossly normal appearing     Esophagus: abnormal:   Esophagus looking a lot better at this time, the thickening and the edema is gone     Stomach:   The stomach still looking the same way with inflammation and thickening of the mucosa in the cardia and the body of the stomach biopsies were taken but separately  In the antrum severe inflammation with nodular mucosa especially around the pyloric orifice once again multiple biopsies were taken from that but in a separate jar        Duodenum:     Descending: normal    Bulb: normal     The scope was removed and the patient tolerated the procedure well.       Recommendations/Plan:   F/U Biopsies  F/U In Office in 3-4 weeks  Discussed with the family     Electronically signed by Clark Childress MD  on 10/11/2022 at 11:41 AM     Poor prep colonoscopy 2022 with polypectomy  Past Medical/Social/Family History:  Past Medical History:   Diagnosis Date    A-fib ([address])     Acquired absence of kidney     Adult hypertrophic pyloric stenosis     Agoraphobia with panic disorder     Agoraphobia without history of panic disorder     Allergic rhinitis     Anemia, unspecified     Anxiety     Anxiety disorder     Arthritis     Atrial fibrillation (HCC)     Back pain     Bronchitis     Caffeine use     8 coffee / day    Cardiomegaly     Carpal tunnel syndrome     Cataracts, bilateral     Centriacinar emphysema (HCC)     Chronic kidney disease, stage III (moderate) (HCC)     Chronic pain     Constipation     Constipation     COPD (chronic obstructive pulmonary disease) ([address])     Emphysema    Cystitis with hematuria     Depression     Diaphragmatic hernia without obstruction or gangrene     Diarrhea     Difficulty walking     Dry eye syndrome of unspecified lacrimal gland     Esophageal obstruction     FOM (frequency of micturition)     Foot drop, right foot     Gastro-esophageal reflux disease with esophagitis, with bleeding     GERD (gastroesophageal reflux disease)     H/O gastric ulcer     HTN (hypertension)     Hyperlipidemia     Hypertension, essential     Klebsiella pneumoniae (k. pneumoniae) as the cause of diseases classified elsewhere     Major depression, recurrent (HCC)     Mitral valve prolapse     Mixed hyperlipidemia     Mumps     Muscle weakness (generalized)     MVP (mitral valve prolapse)     Dr. Jessy Hernadez in May 2019    Old myocardial infarction

## 2023-10-08 NOTE — PLAN OF CARE
Problem: Safety - Adult  Goal: Free from fall injury  10/8/2023 1659 by Zack Dakin, RN  Outcome: Progressing  10/8/2023 0302 by Yung Gonzalez RN  Outcome: Progressing

## 2023-10-09 ENCOUNTER — APPOINTMENT (OUTPATIENT)
Dept: GENERAL RADIOLOGY | Age: 78
DRG: 370 | End: 2023-10-09
Payer: MEDICARE

## 2023-10-09 ENCOUNTER — PATIENT MESSAGE (OUTPATIENT)
Dept: ENDOSCOPY | Age: 78
End: 2023-10-09

## 2023-10-09 PROBLEM — J96.01 ACUTE RESPIRATORY FAILURE WITH HYPOXIA (HCC): Status: RESOLVED | Noted: 2023-01-02 | Resolved: 2023-10-09

## 2023-10-09 PROBLEM — K92.0 COFFEE GROUND EMESIS: Status: RESOLVED | Noted: 2021-02-07 | Resolved: 2023-10-09

## 2023-10-09 PROBLEM — R10.9 ABDOMINAL PAIN: Status: RESOLVED | Noted: 2023-10-08 | Resolved: 2023-10-09

## 2023-10-09 PROBLEM — R10.9 INTRACTABLE ABDOMINAL PAIN: Status: RESOLVED | Noted: 2022-05-08 | Resolved: 2023-10-09

## 2023-10-09 LAB
ANION GAP SERPL CALCULATED.3IONS-SCNC: 9 MMOL/L (ref 9–17)
BASOPHILS # BLD: 0.04 K/UL (ref 0–0.2)
BASOPHILS NFR BLD: 1 % (ref 0–2)
BUN SERPL-MCNC: 7 MG/DL (ref 8–23)
CALCIUM SERPL-MCNC: 8.4 MG/DL (ref 8.6–10.4)
CHLORIDE SERPL-SCNC: 105 MMOL/L (ref 98–107)
CO2 SERPL-SCNC: 23 MMOL/L (ref 20–31)
CREAT SERPL-MCNC: 0.7 MG/DL (ref 0.5–0.9)
EKG ATRIAL RATE: 103 BPM
EKG P AXIS: 43 DEGREES
EKG P-R INTERVAL: 172 MS
EKG Q-T INTERVAL: 354 MS
EKG QRS DURATION: 72 MS
EKG QTC CALCULATION (BAZETT): 463 MS
EKG R AXIS: 79 DEGREES
EKG VENTRICULAR RATE: 103 BPM
EOSINOPHIL # BLD: 0.17 K/UL (ref 0–0.44)
EOSINOPHILS RELATIVE PERCENT: 3 % (ref 1–4)
ERYTHROCYTE [DISTWIDTH] IN BLOOD BY AUTOMATED COUNT: 15.3 % (ref 11.8–14.4)
GFR SERPL CREATININE-BSD FRML MDRD: >60 ML/MIN/1.73M2
GLUCOSE SERPL-MCNC: 96 MG/DL (ref 70–99)
HCT VFR BLD AUTO: 34.5 % (ref 36.3–47.1)
HCT VFR BLD AUTO: 34.6 % (ref 36.3–47.1)
HCT VFR BLD AUTO: 34.8 % (ref 36.3–47.1)
HCT VFR BLD AUTO: 37 % (ref 36.3–47.1)
HGB BLD-MCNC: 10.5 G/DL (ref 11.9–15.1)
HGB BLD-MCNC: 10.5 G/DL (ref 11.9–15.1)
HGB BLD-MCNC: 10.7 G/DL (ref 11.9–15.1)
HGB BLD-MCNC: 11 G/DL (ref 11.9–15.1)
IMM GRANULOCYTES # BLD AUTO: 0.04 K/UL (ref 0–0.3)
IMM GRANULOCYTES NFR BLD: 1 %
LYMPHOCYTES NFR BLD: 1.26 K/UL (ref 1.1–3.7)
LYMPHOCYTES RELATIVE PERCENT: 19 % (ref 24–43)
MCH RBC QN AUTO: 30.3 PG (ref 25.2–33.5)
MCHC RBC AUTO-ENTMCNC: 30.3 G/DL (ref 28.4–34.8)
MCV RBC AUTO: 100 FL (ref 82.6–102.9)
MONOCYTES NFR BLD: 0.58 K/UL (ref 0.1–1.2)
MONOCYTES NFR BLD: 9 % (ref 3–12)
NEUTROPHILS NFR BLD: 67 % (ref 36–65)
NEUTS SEG NFR BLD: 4.66 K/UL (ref 1.5–8.1)
NRBC BLD-RTO: 0 PER 100 WBC
PLATELET # BLD AUTO: 183 K/UL (ref 138–453)
PMV BLD AUTO: 10 FL (ref 8.1–13.5)
POTASSIUM SERPL-SCNC: 4.1 MMOL/L (ref 3.7–5.3)
RBC # BLD AUTO: 3.46 M/UL (ref 3.95–5.11)
RBC # BLD: ABNORMAL 10*6/UL
SODIUM SERPL-SCNC: 137 MMOL/L (ref 135–144)
WBC OTHER # BLD: 6.8 K/UL (ref 3.5–11.3)

## 2023-10-09 PROCEDURE — 85025 COMPLETE CBC W/AUTO DIFF WBC: CPT

## 2023-10-09 PROCEDURE — 6370000000 HC RX 637 (ALT 250 FOR IP): Performed by: INTERNAL MEDICINE

## 2023-10-09 PROCEDURE — C9113 INJ PANTOPRAZOLE SODIUM, VIA: HCPCS | Performed by: INTERNAL MEDICINE

## 2023-10-09 PROCEDURE — 1200000000 HC SEMI PRIVATE

## 2023-10-09 PROCEDURE — 93010 ELECTROCARDIOGRAM REPORT: CPT | Performed by: INTERNAL MEDICINE

## 2023-10-09 PROCEDURE — 85014 HEMATOCRIT: CPT

## 2023-10-09 PROCEDURE — 85018 HEMOGLOBIN: CPT

## 2023-10-09 PROCEDURE — 6360000002 HC RX W HCPCS: Performed by: INTERNAL MEDICINE

## 2023-10-09 PROCEDURE — 2580000003 HC RX 258: Performed by: INTERNAL MEDICINE

## 2023-10-09 PROCEDURE — 2500000003 HC RX 250 WO HCPCS: Performed by: NURSE PRACTITIONER

## 2023-10-09 PROCEDURE — 74240 X-RAY XM UPR GI TRC 1CNTRST: CPT

## 2023-10-09 PROCEDURE — 99232 SBSQ HOSP IP/OBS MODERATE 35: CPT | Performed by: NURSE PRACTITIONER

## 2023-10-09 PROCEDURE — 36415 COLL VENOUS BLD VENIPUNCTURE: CPT

## 2023-10-09 PROCEDURE — 80048 BASIC METABOLIC PNL TOTAL CA: CPT

## 2023-10-09 RX ORDER — OXYCODONE HYDROCHLORIDE AND ACETAMINOPHEN 5; 325 MG/1; MG/1
1 TABLET ORAL EVERY 6 HOURS PRN
Qty: 12 TABLET | Refills: 0 | Status: SHIPPED | OUTPATIENT
Start: 2023-10-09 | End: 2023-10-12

## 2023-10-09 RX ADMIN — SODIUM CHLORIDE: 9 INJECTION, SOLUTION INTRAVENOUS at 02:57

## 2023-10-09 RX ADMIN — OXYCODONE AND ACETAMINOPHEN 1 TABLET: 5; 325 TABLET ORAL at 06:29

## 2023-10-09 RX ADMIN — ONDANSETRON 4 MG: 2 INJECTION INTRAMUSCULAR; INTRAVENOUS at 06:30

## 2023-10-09 RX ADMIN — AMITRIPTYLINE HYDROCHLORIDE 25 MG: 25 TABLET, FILM COATED ORAL at 10:57

## 2023-10-09 RX ADMIN — QUETIAPINE FUMARATE 100 MG: 100 TABLET ORAL at 10:56

## 2023-10-09 RX ADMIN — SODIUM CHLORIDE, PRESERVATIVE FREE 10 ML: 5 INJECTION INTRAVENOUS at 13:42

## 2023-10-09 RX ADMIN — ESCITALOPRAM 20 MG: 10 TABLET, FILM COATED ORAL at 10:56

## 2023-10-09 RX ADMIN — AMITRIPTYLINE HYDROCHLORIDE 25 MG: 25 TABLET, FILM COATED ORAL at 21:13

## 2023-10-09 RX ADMIN — BENZTROPINE MESYLATE 0.5 MG: 0.5 TABLET ORAL at 21:14

## 2023-10-09 RX ADMIN — FERROUS SULFATE TAB EC 325 MG (65 MG FE EQUIVALENT) 325 MG: 325 (65 FE) TABLET DELAYED RESPONSE at 10:56

## 2023-10-09 RX ADMIN — Medication 1 CAPSULE: at 21:14

## 2023-10-09 RX ADMIN — PANTOPRAZOLE SODIUM 8 MG/HR: 40 INJECTION, POWDER, FOR SOLUTION INTRAVENOUS at 06:26

## 2023-10-09 RX ADMIN — GABAPENTIN 300 MG: 300 CAPSULE ORAL at 13:41

## 2023-10-09 RX ADMIN — POLYETHYLENE GLYCOL 3350 17 G: 17 POWDER, FOR SOLUTION ORAL at 10:53

## 2023-10-09 RX ADMIN — ATORVASTATIN CALCIUM 40 MG: 80 TABLET, FILM COATED ORAL at 21:13

## 2023-10-09 RX ADMIN — OXYCODONE AND ACETAMINOPHEN 1 TABLET: 5; 325 TABLET ORAL at 10:54

## 2023-10-09 RX ADMIN — AMITRIPTYLINE HYDROCHLORIDE 25 MG: 25 TABLET, FILM COATED ORAL at 13:40

## 2023-10-09 RX ADMIN — SODIUM CHLORIDE, PRESERVATIVE FREE 10 ML: 5 INJECTION INTRAVENOUS at 21:14

## 2023-10-09 RX ADMIN — BUSPIRONE HYDROCHLORIDE 10 MG: 10 TABLET ORAL at 10:57

## 2023-10-09 RX ADMIN — BARIUM SULFATE 150 ML: 960 POWDER, FOR SUSPENSION ORAL at 09:50

## 2023-10-09 RX ADMIN — TRAZODONE HYDROCHLORIDE 100 MG: 100 TABLET ORAL at 21:13

## 2023-10-09 RX ADMIN — BUSPIRONE HYDROCHLORIDE 10 MG: 10 TABLET ORAL at 21:13

## 2023-10-09 RX ADMIN — CETIRIZINE HYDROCHLORIDE 10 MG: 10 TABLET ORAL at 10:57

## 2023-10-09 RX ADMIN — GABAPENTIN 300 MG: 300 CAPSULE ORAL at 10:56

## 2023-10-09 RX ADMIN — Medication 1000 MCG: at 10:56

## 2023-10-09 RX ADMIN — GABAPENTIN 300 MG: 300 CAPSULE ORAL at 21:14

## 2023-10-09 RX ADMIN — PANTOPRAZOLE SODIUM 8 MG/HR: 40 INJECTION, POWDER, FOR SOLUTION INTRAVENOUS at 11:00

## 2023-10-09 RX ADMIN — OXYCODONE AND ACETAMINOPHEN 1 TABLET: 5; 325 TABLET ORAL at 23:43

## 2023-10-09 RX ADMIN — METOPROLOL SUCCINATE 25 MG: 25 TABLET, FILM COATED, EXTENDED RELEASE ORAL at 13:40

## 2023-10-09 RX ADMIN — ONDANSETRON 4 MG: 4 TABLET, ORALLY DISINTEGRATING ORAL at 23:43

## 2023-10-09 RX ADMIN — BENZTROPINE MESYLATE 0.5 MG: 0.5 TABLET ORAL at 10:57

## 2023-10-09 RX ADMIN — Medication 1 CAPSULE: at 10:57

## 2023-10-09 RX ADMIN — CLONAZEPAM 0.5 MG: 1 TABLET ORAL at 13:41

## 2023-10-09 RX ADMIN — ONDANSETRON 4 MG: 2 INJECTION INTRAMUSCULAR; INTRAVENOUS at 11:04

## 2023-10-09 RX ADMIN — OXYCODONE AND ACETAMINOPHEN 1 TABLET: 5; 325 TABLET ORAL at 18:35

## 2023-10-09 RX ADMIN — TAMSULOSIN HYDROCHLORIDE 0.4 MG: 0.4 CAPSULE ORAL at 10:56

## 2023-10-09 ASSESSMENT — PAIN SCALES - GENERAL
PAINLEVEL_OUTOF10: 7
PAINLEVEL_OUTOF10: 8
PAINLEVEL_OUTOF10: 7
PAINLEVEL_OUTOF10: 7

## 2023-10-09 ASSESSMENT — PAIN DESCRIPTION - ORIENTATION
ORIENTATION: RIGHT
ORIENTATION: LEFT
ORIENTATION: RIGHT;LEFT

## 2023-10-09 ASSESSMENT — PAIN - FUNCTIONAL ASSESSMENT
PAIN_FUNCTIONAL_ASSESSMENT: ACTIVITIES ARE NOT PREVENTED
PAIN_FUNCTIONAL_ASSESSMENT: ACTIVITIES ARE NOT PREVENTED

## 2023-10-09 ASSESSMENT — PAIN DESCRIPTION - LOCATION
LOCATION: ABDOMEN

## 2023-10-09 ASSESSMENT — PAIN DESCRIPTION - DESCRIPTORS
DESCRIPTORS: ACHING

## 2023-10-09 NOTE — CARE COORDINATION
Transitional Planning:  Spoke with Caron at [facility]. They can accept back today. Report:  [phone]   Fax:  [phone]. Paperwork faxed to [facility].    13:30  Call to [facility]. Discharged cancelled. Placed on will call. 14:38  Caron at [facility] informed.

## 2023-10-09 NOTE — PLAN OF CARE
Problem: Pain  Goal: Verbalizes/displays adequate comfort level or baseline comfort level  Outcome: Progressing     Problem: Safety - Adult  Goal: Free from fall injury  10/9/2023 0416 by Rose Wang RN  Outcome: Progressing  10/8/2023 1659 by Tran Bryan RN  Outcome: Progressing

## 2023-10-09 NOTE — PROGRESS NOTES
[facility]  Office: [phone]  Maren Wood, DO, Casey José, DO, Sindhu Rosas, DO, Siddharth Jensen, DO, Vincent Sorenson MD, Jessica Cabrera MD, Jem Cohen MD, Ade Morton MD,  Diony Baker MD, Stefanie Murray MD, Melanie Norwood, DO, Neymar Moseley MD,  Jaylon Headley MD, Dilshad Chester MD, Greg Kenyon DO, Ashley Mitchell MD,  Ren Steen DO, Raymond Cota MD, Daniel Gentile MD, Marquis Greco MD, Lisette Vigil MD,  Ry Ruano MD, Calin Mims MD, Jem Monroy MD, Angel Latif MD, Trupti Garzon DO, Zaira Winter MD,  Dirk Merrill MD, Chuckie Mott MD, Jhony Rincon, CNP,  Eyad Borjas, CNP,, Aminata Kenny, CNP,  Myron Taylor, [facility], Daryl Holder, CNP, Katia Pham, CNP, Arlene Ward, CNP, Isabela Winter, CNP, Ade Flynn, CNP, Shauna Tomlinson, CNP, Karen Mendosa, CNS, Susan Cruz, CNP, Rodo Martin, [facility] Ynes Chanel René Conner    Progress Note    10/9/2023    7:38 AM    Name:   Reena Newman  MRN:     [id number]     Acct:      [de-identified]   Room:   [address] [place] [address] Day:  1  Admit Date:  10/7/2023 12:33 PM    PCP:   No primary care provider on file. Code Status:  Full Code    Subjective:     C/C:   Chief Complaint   Patient presents with    Emesis    Abdominal Pain     Interval History Status: improved. Patient seen and evaluated in room sitting in bed. No acute events overnight. Wants to know if her diet can be advanced. Had SBFT completed this morning demonstrating a corkscrew esophagus. GI did advance diet to a soft diet. Brief History:     80-year-old female presents to the emergency department for evaluation of coffee-ground emesis with epigastric pain. Has history of GOO and gastric ulceration.   Hemoglobin continues to uptrend without intervention    Review of Systems:     Constitutional:  negative for chills, fevers, sweats  Respiratory:  negative hernia and lower esophagus is patulous containing fluid. Consider follow-up GI consultation 3 cm spiculated and lobulated soft tissue mass in the left breast.  Findings are suspicious for malignancy. Please refer to recent mammogram and sonogram report of 08/31/2023 No intra-abdominal or intrapelvic abnormalities are noted. XR ABDOMEN (KUB) (SINGLE AP VIEW)    Result Date: 10/7/2023  No evidence of pneumoperitoneum. XR CHEST PORTABLE    Result Date: 10/7/2023  New opacification in the lateral aspect of the left lung base which may represent pneumonia. Physical Examination:        General appearance:  alert, cooperative and no distress,   Mental Status:  oriented to person, place and time and normal affect  Lungs:  clear to auscultation bilaterally, normal effort  Heart:  regular rate and rhythm, + murmur  Abdomen:  obese, soft, nontender, nondistended, normal bowel sounds, no masses, hepatomegaly, splenomegaly  Extremities:  no edema, redness, tenderness in the calves  Skin:  no gross lesions, rashes, induration, + pale    Assessment:        Hospital Problems             Last Modified POA    * (Principal) Coffee ground emesis 10/8/2023 Yes    Intractable abdominal pain 10/8/2023 Yes    COPD (chronic obstructive pulmonary disease) ([address]) 10/7/2023 Yes    Acute respiratory failure with hypoxia ([address]) 10/8/2023 Yes    GERD (gastroesophageal reflux disease) 10/8/2023 Yes    Overview Signed 12/18/2021  7:10 AM by MARGOTH Cross NP     Last Assessment & Plan:   Formatting of this note might be different from the original.  S/p hydrostatic balloon dilation. Considering POEM  Formatting of this note might be different from the original.  Last Assessment & Plan:   S/p hydrostatic balloon dilation.  Considering POEM         Anxiety 10/8/2023 Yes    H/O gastric ulcer 10/7/2023 Yes    Hyperlipidemia 10/8/2023 Yes    Essential hypertension 10/7/2023 Yes    Class 1 obesity in adult 10/8/2023 Yes    Abdominal

## 2023-10-09 NOTE — CARE COORDINATION
Case Management Assessment  Initial Evaluation    Date/Time of Evaluation: 10/9/2023 12:41 PM  Assessment Completed by: Jaylene Yang RN    If patient is discharged prior to next notation, then this note serves as note for discharge by case management. Patient Name: Kelvin Medeiros                   YOB: 1945  Diagnosis: Coffee ground emesis [K92.0]  Gastrointestinal hemorrhage with hematemesis [K92.0]  Abdominal pain [R10.9]                   Date / Time: 10/7/2023 12:33 PM    Patient Admission Status: Inpatient   Readmission Risk (Low < 19, Mod (19-27), High > 27): Readmission Risk Score: 29.7    Current PCP: No primary care provider on file. PCP verified by CM? Yes (PCP at SNF)    Chart Reviewed: Yes      History Provided by: Patient  Patient Orientation: Alert and Oriented    Patient Cognition: Alert    Hospitalization in the last 30 days (Readmission):  Yes    If yes, Readmission Assessment in CM Navigator will be completed.   Readmission Assessment  Number of Days since last admission?: 1-7 days  Previous Disposition: SNF  Who is being Interviewed: Patient  What was the patient's/caregiver's perception as to why they think they needed to return back to the hospital?: Other (Comment) (started vomiting)  Did you visit your Primary Care Physician after you left the hospital, before you returned this time?: No  Why weren't you able to visit your PCP?: Did not have an appointment  Did you see a specialist, such as Cardiac, Pulmonary, Orthopedic Physician, etc. after you left the hospital?: No  Who advised the patient to return to the hospital?: Other (Comment) (SNF)  Does the patient report anything that got in the way of taking their medications?: No  In our efforts to provide the best possible care to you and others like you, can you think of anything that we could have done to help you after you left the hospital the first time, so that you might not have needed to return so soon?: Other provider and agrees with the discharge plan. Freedom of choice list with basic dialogue that supports the patient's individualized plan of care/goals and shares the quality data associated with the providers was provided to:     Patient Representative Name:       The Patient and/or Patient Representative Agree with the Discharge Plan?   Yes    Darien Rodríguez RN  Case Management Department  Ph: [phone]

## 2023-10-09 NOTE — DISCHARGE INSTRUCTIONS
Follow-up with PCP regarding mammogram for suspicious mass found on left breast from CT     Will need repeat EGD in 2 to 3 months to check for healing of esophagitis and repeat dilation if necessary

## 2023-10-09 NOTE — DISCHARGE INSTR - COC
Continuity of Care Form    Patient Name: Destiny Snyder   :  1945  MRN:  [id number]    Admit date:  10/7/2023  Discharge date:  10/11/2023    Code Status Order: Full Code   Advance Directives:     Admitting Physician:  Heike Zelaya MD  PCP: No primary care provider on file.     Discharging Nurse: [facility] Unit/Room#: 9254/8260-31  Discharging Unit Phone Number: [phone]    Emergency Contact:   Extended Emergency Contact Information  Primary Emergency Contact: Hamilton Ramirez  Home Phone: [phone]  Relation: Child    Past Surgical History:  Past Surgical History:   Procedure Laterality Date    APPENDECTOMY      CARPAL TUNNEL RELEASE      CHOLECYSTECTOMY, LAPAROSCOPIC N/A 2020    XI LAPAROSCOPIC ROBOTIC CHOLECYSTECTOMY, PYLOROPLASTY performed by Cresencio Thapa MD at [facility]      COLONOSCOPY N/A 2022    COLONOSCOPY POLYPECTOMY HOT performed by Mary Parks MD at [address]      ERCP  2020    with stent insertion, balloon dilation, sphinctereotomy    ERCP  2020    ** CASE IN OR WITY GI STAFF** ERCP STENT INSERTION performed by Mary Parks MD at [address] [facility]    ERCP  2020    ** CASE IN OR WITH GI STAFF**ERCP SPHINCTER/PAPILLOTOMY performed by Mary Parks MD at [facility]    ERCP  2020    ** CASE IN OR WITH GI STAFF**ERCP DILATION BALLOON performed by Mary Parks MD at [facility]    ERCP N/A 2021    ERCP STENT REMOVAL performed by Dafne Luna MD at [facility]    ERCP  2021    ERCP STONE REMOVAL performed by Dafne Luna MD at [address] [facility]    GASTRIC FUNDOPLICATION N/A     XI LAPAROSCOPIC ROBOTIC [address], CHERYL FUNDOPLICATION performed by Cresencio Thapa MD at [facility] 2020    EGD PEG TUBE PLACEMENT performed by Cresencio Thapa MD at [facility] N/A 2021    **CASE 10/9/2023 1234  Last data filed at 10/9/2023 0522  Gross per 24 hour   Intake 1760 ml   Output 2300 ml   Net -540 ml     I/O last 3 completed shifts: In: 2209 [P.O.:460; I.V.:1300]  Out: 2700 [Urine:2700]    Safety Concerns: At Risk for Falls    Impairments/Disabilities:      None    Nutrition Therapy:  Current Nutrition Therapy:   - Oral Diet:  Easy to Chew and Dysphagia - Soft and Bite Sized    Routes of Feeding: Oral  Liquids: No Restrictions  Daily Fluid Restriction: no  Last Modified Barium Swallow with Video (Video Swallowing Test): not done    Treatments at the Time of Hospital Discharge:   Respiratory Treatments: na  Oxygen Therapy:  is on oxygen at 2 L/min per nasal cannula. Ventilator:    - No ventilator support    Rehab Therapies: Physical Therapy and Occupational Therapy  Weight Bearing Status/Restrictions: No weight bearing restrictions  Other Medical Equipment (for information only, NOT a DME order):  wheelchair, bedside commode, and hospital bed  Other Treatments: na    Patient's personal belongings (please select all that are sent with patient):  Dentures upper    RN SIGNATURE:  Electronically signed by Ivan Mir RN on 10/11/23 at 11:17 AM EDT    CASE MANAGEMENT/SOCIAL WORK SECTION    Inpatient Status Date: ***    Readmission Risk Assessment Score:  Readmission Risk              Risk of Unplanned Readmission:  30           Discharging to Facility/ Agency   Name: [facility]  Address:  Phone:  Fax:    Dialysis Facility (if applicable)   Name:  Address:  Dialysis Schedule:  Phone:  Fax:    / signature: Electronically signed by Jean Marie Tompkins RN on 10/9/23 at 12:35 PM EDT    PHYSICIAN SECTION    Prognosis: Fair    Condition at Discharge: Stable    Rehab Potential (if transferring to Rehab):  Fair    Recommended Labs or Other Treatments After Discharge: continues follow up with oncology regarding breast mass    Physician Certification: I certify the above

## 2023-10-09 NOTE — TELEPHONE ENCOUNTER
From: Emmanuel Hoang  To: Dr. Heidi Bacon: 10/9/2023 1:11 PM EDT  Subject: Please call me    This is Nickolas Miranda. I have asked multiple times for you to call me regarding my mother, Alexandra Salinas. She has no idea what you are talking about or planning. Will you please call me @ [phone]?

## 2023-10-10 ENCOUNTER — ANESTHESIA (OUTPATIENT)
Dept: OPERATING ROOM | Age: 78
End: 2023-10-10
Payer: MEDICARE

## 2023-10-10 ENCOUNTER — ANESTHESIA EVENT (OUTPATIENT)
Dept: OPERATING ROOM | Age: 78
End: 2023-10-10
Payer: MEDICARE

## 2023-10-10 LAB
HCT VFR BLD AUTO: 37.2 % (ref 36.3–47.1)
HCT VFR BLD AUTO: 37.8 % (ref 36.3–47.1)
HCT VFR BLD AUTO: 38.1 % (ref 36.3–47.1)
HCT VFR BLD AUTO: 41.6 % (ref 36.3–47.1)
HGB BLD-MCNC: 11.5 G/DL (ref 11.9–15.1)
HGB BLD-MCNC: 11.6 G/DL (ref 11.9–15.1)
HGB BLD-MCNC: 11.9 G/DL (ref 11.9–15.1)
HGB BLD-MCNC: 12.7 G/DL (ref 11.9–15.1)

## 2023-10-10 PROCEDURE — C1726 CATH, BAL DIL, NON-VASCULAR: HCPCS | Performed by: INTERNAL MEDICINE

## 2023-10-10 PROCEDURE — 1200000000 HC SEMI PRIVATE

## 2023-10-10 PROCEDURE — 6370000000 HC RX 637 (ALT 250 FOR IP): Performed by: INTERNAL MEDICINE

## 2023-10-10 PROCEDURE — 36415 COLL VENOUS BLD VENIPUNCTURE: CPT

## 2023-10-10 PROCEDURE — 6360000002 HC RX W HCPCS: Performed by: NURSE ANESTHETIST, CERTIFIED REGISTERED

## 2023-10-10 PROCEDURE — 0D738ZZ DILATION OF LOWER ESOPHAGUS, VIA NATURAL OR ARTIFICIAL OPENING ENDOSCOPIC: ICD-10-PCS | Performed by: INTERNAL MEDICINE

## 2023-10-10 PROCEDURE — 7100000001 HC PACU RECOVERY - ADDTL 15 MIN: Performed by: INTERNAL MEDICINE

## 2023-10-10 PROCEDURE — 85018 HEMOGLOBIN: CPT

## 2023-10-10 PROCEDURE — 3700000001 HC ADD 15 MINUTES (ANESTHESIA): Performed by: INTERNAL MEDICINE

## 2023-10-10 PROCEDURE — 7100000000 HC PACU RECOVERY - FIRST 15 MIN: Performed by: INTERNAL MEDICINE

## 2023-10-10 PROCEDURE — 99232 SBSQ HOSP IP/OBS MODERATE 35: CPT | Performed by: NURSE PRACTITIONER

## 2023-10-10 PROCEDURE — 2580000003 HC RX 258: Performed by: NURSE ANESTHETIST, CERTIFIED REGISTERED

## 2023-10-10 PROCEDURE — 3609017700 HC EGD DILATION GASTRIC/DUODENAL STRICTURE: Performed by: INTERNAL MEDICINE

## 2023-10-10 PROCEDURE — 2580000003 HC RX 258: Performed by: INTERNAL MEDICINE

## 2023-10-10 PROCEDURE — 6360000002 HC RX W HCPCS: Performed by: INTERNAL MEDICINE

## 2023-10-10 PROCEDURE — 3700000000 HC ANESTHESIA ATTENDED CARE: Performed by: INTERNAL MEDICINE

## 2023-10-10 PROCEDURE — 85014 HEMATOCRIT: CPT

## 2023-10-10 PROCEDURE — 2500000003 HC RX 250 WO HCPCS: Performed by: NURSE ANESTHETIST, CERTIFIED REGISTERED

## 2023-10-10 PROCEDURE — C9113 INJ PANTOPRAZOLE SODIUM, VIA: HCPCS | Performed by: INTERNAL MEDICINE

## 2023-10-10 PROCEDURE — 2720000010 HC SURG SUPPLY STERILE: Performed by: INTERNAL MEDICINE

## 2023-10-10 PROCEDURE — 2709999900 HC NON-CHARGEABLE SUPPLY: Performed by: INTERNAL MEDICINE

## 2023-10-10 RX ORDER — SODIUM CHLORIDE 9 MG/ML
INJECTION, SOLUTION INTRAVENOUS PRN
Status: DISCONTINUED | OUTPATIENT
Start: 2023-10-10 | End: 2023-10-10 | Stop reason: HOSPADM

## 2023-10-10 RX ORDER — LIDOCAINE HYDROCHLORIDE 10 MG/ML
INJECTION, SOLUTION EPIDURAL; INFILTRATION; INTRACAUDAL; PERINEURAL PRN
Status: DISCONTINUED | OUTPATIENT
Start: 2023-10-10 | End: 2023-10-10 | Stop reason: SDUPTHER

## 2023-10-10 RX ORDER — FENTANYL CITRATE 50 UG/ML
50 INJECTION, SOLUTION INTRAMUSCULAR; INTRAVENOUS EVERY 5 MIN PRN
Status: DISCONTINUED | OUTPATIENT
Start: 2023-10-10 | End: 2023-10-10 | Stop reason: HOSPADM

## 2023-10-10 RX ORDER — SODIUM CHLORIDE 0.9 % (FLUSH) 0.9 %
5-40 SYRINGE (ML) INJECTION EVERY 12 HOURS SCHEDULED
Status: DISCONTINUED | OUTPATIENT
Start: 2023-10-10 | End: 2023-10-10 | Stop reason: HOSPADM

## 2023-10-10 RX ORDER — ONDANSETRON 2 MG/ML
4 INJECTION INTRAMUSCULAR; INTRAVENOUS
Status: DISCONTINUED | OUTPATIENT
Start: 2023-10-10 | End: 2023-10-10 | Stop reason: HOSPADM

## 2023-10-10 RX ORDER — SODIUM CHLORIDE 0.9 % (FLUSH) 0.9 %
5-40 SYRINGE (ML) INJECTION PRN
Status: DISCONTINUED | OUTPATIENT
Start: 2023-10-10 | End: 2023-10-10 | Stop reason: HOSPADM

## 2023-10-10 RX ORDER — FENTANYL CITRATE 50 UG/ML
25 INJECTION, SOLUTION INTRAMUSCULAR; INTRAVENOUS EVERY 5 MIN PRN
Status: DISCONTINUED | OUTPATIENT
Start: 2023-10-10 | End: 2023-10-10 | Stop reason: HOSPADM

## 2023-10-10 RX ORDER — SODIUM CHLORIDE 9 MG/ML
INJECTION, SOLUTION INTRAVENOUS CONTINUOUS PRN
Status: DISCONTINUED | OUTPATIENT
Start: 2023-10-10 | End: 2023-10-10 | Stop reason: SDUPTHER

## 2023-10-10 RX ORDER — PROPOFOL 10 MG/ML
INJECTION, EMULSION INTRAVENOUS PRN
Status: DISCONTINUED | OUTPATIENT
Start: 2023-10-10 | End: 2023-10-10 | Stop reason: SDUPTHER

## 2023-10-10 RX ORDER — HYDRALAZINE HYDROCHLORIDE 20 MG/ML
10 INJECTION INTRAMUSCULAR; INTRAVENOUS
Status: DISCONTINUED | OUTPATIENT
Start: 2023-10-10 | End: 2023-10-10 | Stop reason: HOSPADM

## 2023-10-10 RX ORDER — SODIUM CHLORIDE, SODIUM LACTATE, POTASSIUM CHLORIDE, CALCIUM CHLORIDE 600; 310; 30; 20 MG/100ML; MG/100ML; MG/100ML; MG/100ML
INJECTION, SOLUTION INTRAVENOUS CONTINUOUS
Status: DISCONTINUED | OUTPATIENT
Start: 2023-10-10 | End: 2023-10-10 | Stop reason: HOSPADM

## 2023-10-10 RX ADMIN — FERROUS SULFATE TAB EC 325 MG (65 MG FE EQUIVALENT) 325 MG: 325 (65 FE) TABLET DELAYED RESPONSE at 08:27

## 2023-10-10 RX ADMIN — OXYCODONE AND ACETAMINOPHEN 1 TABLET: 5; 325 TABLET ORAL at 13:47

## 2023-10-10 RX ADMIN — TAMSULOSIN HYDROCHLORIDE 0.4 MG: 0.4 CAPSULE ORAL at 08:28

## 2023-10-10 RX ADMIN — SODIUM CHLORIDE, PRESERVATIVE FREE 10 ML: 5 INJECTION INTRAVENOUS at 08:29

## 2023-10-10 RX ADMIN — ONDANSETRON 4 MG: 4 TABLET, ORALLY DISINTEGRATING ORAL at 08:48

## 2023-10-10 RX ADMIN — BENZTROPINE MESYLATE 0.5 MG: 0.5 TABLET ORAL at 08:27

## 2023-10-10 RX ADMIN — HYPROMELLOSE 2910 1 DROP: 5 SOLUTION/ DROPS OPHTHALMIC at 21:18

## 2023-10-10 RX ADMIN — BUSPIRONE HYDROCHLORIDE 10 MG: 10 TABLET ORAL at 21:15

## 2023-10-10 RX ADMIN — PANTOPRAZOLE SODIUM 8 MG/HR: 40 INJECTION, POWDER, FOR SOLUTION INTRAVENOUS at 12:14

## 2023-10-10 RX ADMIN — PROPOFOL 50 MG: 10 INJECTION, EMULSION INTRAVENOUS at 15:31

## 2023-10-10 RX ADMIN — BENZTROPINE MESYLATE 0.5 MG: 0.5 TABLET ORAL at 21:15

## 2023-10-10 RX ADMIN — ONDANSETRON 4 MG: 2 INJECTION INTRAMUSCULAR; INTRAVENOUS at 21:18

## 2023-10-10 RX ADMIN — OXYCODONE AND ACETAMINOPHEN 1 TABLET: 5; 325 TABLET ORAL at 08:56

## 2023-10-10 RX ADMIN — GABAPENTIN 300 MG: 300 CAPSULE ORAL at 21:15

## 2023-10-10 RX ADMIN — FLUTICASONE PROPIONATE 1 SPRAY: 50 SPRAY, METERED NASAL at 08:29

## 2023-10-10 RX ADMIN — LIDOCAINE HYDROCHLORIDE 50 MG: 10 INJECTION, SOLUTION EPIDURAL; INFILTRATION; INTRACAUDAL; PERINEURAL at 15:31

## 2023-10-10 RX ADMIN — HYPROMELLOSE 2910 1 DROP: 5 SOLUTION/ DROPS OPHTHALMIC at 08:29

## 2023-10-10 RX ADMIN — PROPOFOL 50 MG: 10 INJECTION, EMULSION INTRAVENOUS at 15:34

## 2023-10-10 RX ADMIN — AMITRIPTYLINE HYDROCHLORIDE 25 MG: 25 TABLET, FILM COATED ORAL at 08:27

## 2023-10-10 RX ADMIN — CLONAZEPAM 0.5 MG: 1 TABLET ORAL at 08:48

## 2023-10-10 RX ADMIN — PROPOFOL 30 MG: 10 INJECTION, EMULSION INTRAVENOUS at 15:40

## 2023-10-10 RX ADMIN — OXYCODONE AND ACETAMINOPHEN 1 TABLET: 5; 325 TABLET ORAL at 21:23

## 2023-10-10 RX ADMIN — Medication 1 CAPSULE: at 08:29

## 2023-10-10 RX ADMIN — GABAPENTIN 300 MG: 300 CAPSULE ORAL at 08:27

## 2023-10-10 RX ADMIN — ESCITALOPRAM 20 MG: 10 TABLET, FILM COATED ORAL at 08:27

## 2023-10-10 RX ADMIN — OXYCODONE AND ACETAMINOPHEN 1 TABLET: 5; 325 TABLET ORAL at 17:45

## 2023-10-10 RX ADMIN — Medication 1000 MCG: at 08:28

## 2023-10-10 RX ADMIN — CLONAZEPAM 0.5 MG: 1 TABLET ORAL at 21:15

## 2023-10-10 RX ADMIN — BUSPIRONE HYDROCHLORIDE 10 MG: 10 TABLET ORAL at 08:27

## 2023-10-10 RX ADMIN — AMITRIPTYLINE HYDROCHLORIDE 25 MG: 25 TABLET, FILM COATED ORAL at 21:15

## 2023-10-10 RX ADMIN — CETIRIZINE HYDROCHLORIDE 10 MG: 10 TABLET ORAL at 08:27

## 2023-10-10 RX ADMIN — TRAZODONE HYDROCHLORIDE 100 MG: 100 TABLET ORAL at 21:15

## 2023-10-10 RX ADMIN — METOPROLOL SUCCINATE 25 MG: 25 TABLET, FILM COATED, EXTENDED RELEASE ORAL at 08:28

## 2023-10-10 RX ADMIN — ATORVASTATIN CALCIUM 40 MG: 80 TABLET, FILM COATED ORAL at 21:16

## 2023-10-10 RX ADMIN — QUETIAPINE FUMARATE 100 MG: 100 TABLET ORAL at 08:28

## 2023-10-10 RX ADMIN — Medication 1 CAPSULE: at 21:16

## 2023-10-10 RX ADMIN — SODIUM CHLORIDE: 0.9 INJECTION, SOLUTION INTRAVENOUS at 15:23

## 2023-10-10 ASSESSMENT — PAIN DESCRIPTION - LOCATION
LOCATION: ABDOMEN
LOCATION: GENERALIZED;STERNUM
LOCATION: CHEST
LOCATION: STERNUM

## 2023-10-10 ASSESSMENT — PAIN SCALES - GENERAL
PAINLEVEL_OUTOF10: 2
PAINLEVEL_OUTOF10: 8
PAINLEVEL_OUTOF10: 8
PAINLEVEL_OUTOF10: 6
PAINLEVEL_OUTOF10: 8

## 2023-10-10 ASSESSMENT — PAIN - FUNCTIONAL ASSESSMENT
PAIN_FUNCTIONAL_ASSESSMENT: PREVENTS OR INTERFERES SOME ACTIVE ACTIVITIES AND ADLS
PAIN_FUNCTIONAL_ASSESSMENT: NONE - DENIES PAIN
PAIN_FUNCTIONAL_ASSESSMENT: PREVENTS OR INTERFERES SOME ACTIVE ACTIVITIES AND ADLS
PAIN_FUNCTIONAL_ASSESSMENT: PREVENTS OR INTERFERES SOME ACTIVE ACTIVITIES AND ADLS

## 2023-10-10 ASSESSMENT — PAIN DESCRIPTION - ONSET: ONSET: ON-GOING

## 2023-10-10 ASSESSMENT — PAIN DESCRIPTION - ORIENTATION
ORIENTATION: MID

## 2023-10-10 ASSESSMENT — PAIN DESCRIPTION - DESCRIPTORS
DESCRIPTORS: ACHING
DESCRIPTORS: ACHING;HEAVINESS
DESCRIPTORS: HEAVINESS;ACHING
DESCRIPTORS: SORE

## 2023-10-10 ASSESSMENT — ENCOUNTER SYMPTOMS: SHORTNESS OF BREATH: 1

## 2023-10-10 ASSESSMENT — PAIN SCALES - WONG BAKER: WONGBAKER_NUMERICALRESPONSE: 0

## 2023-10-10 ASSESSMENT — PAIN DESCRIPTION - FREQUENCY: FREQUENCY: CONTINUOUS

## 2023-10-10 NOTE — TELEPHONE ENCOUNTER
Writer called pt son and it has nothing to do with procedures that were scheduled with Corrina for August, pt son is calling about his mom currently inpatient and is seeing Dr. Margarito Vanessa and he has multiple questions regarding procedures pt is supposed to be having today, he is very upset because no one will call him back, he is requesting a phone call from Dr. Margarito Vanessa.

## 2023-10-10 NOTE — OP NOTE
Operative Note      Patient: Steve Hudson  YOB: 1945  MRN: [id number]    Date of Procedure: 10/10/2023    Pre-Op Diagnosis Codes:     * Esophageal spasm [K22.4]    Post-Op Diagnosis: Same  Moderate reflux esophagitis in the distal esophagus  Spastic distal esophagus. Dilated up to 20 mm       Procedure(s):  EGD DILATION BALLOON    Surgeon(s):  Cynthia Rolon MD    Assistant:   First Assistant: Lance Villeda RN    Anesthesia: Monitor Anesthesia Care    Estimated Blood Loss (mL): Minimal    Complications: None    Specimens:   * No specimens in log *    Implants:  * No implants in log *      Drains:   External Urinary Catheter (Active)   Site Assessment Clean,dry & intact 10/10/23 0400   Placement Repositioned 10/09/23 2000   Securement Method Securing device (Describe) 10/10/23 0400   Catheter Care Catheter/Wick replaced 10/09/23 0400   Perineal Care No 10/09/23 2300   Suction 40 mmgHg continuous 10/10/23 0400   Urine Color Yellow 10/10/23 0400   Urine Appearance Clear 10/10/23 0400   Output (mL) 650 mL 10/10/23 1441       Findings:   LA grade C erosive esophagitis in the distal esophagus. The GE junction was noted at 35 cm from the incisors. There was a significant amount of reflux noted up to the mid esophagus. The distal esophagus appeared to spasm during passage of scope. A TTS dilator with a 15-18 then 18-20 CRE balloon were passed and sequential dilations were performed for 30 seconds at each station up to a maximum of 20 mm. Post dilation inspection revealed minor superficial radial tears suggesting suspect of dilation. Estimated blood loss was minimal.  Small amount of food was found in the stomach. The stomach mucosa otherwise appeared normal on forward and retroflexed views. The examined duodenum appeared normal.    Recommendations:  Continue pantoprazole 40 mg twice daily for 8 to 10 weeks. Okay to resume soft diet.   Okay to discharge patient from GI standpoint when tolerating

## 2023-10-10 NOTE — ANESTHESIA PRE PROCEDURE
\"LABRH\"    Drug/Infectious Status (If Applicable):  Lab Results   Component Value Date/Time    HEPCAB NONREACTIVE 05/16/2020 01:01 PM       COVID-19 Screening (If Applicable):   Lab Results   Component Value Date/Time    COVID19 Not Detected 07/18/2023 03:05 PM    COVID19 Not Detected 02/07/2021 03:25 PM           Anesthesia Evaluation  Patient summary reviewed and Nursing notes reviewed no history of anesthetic complications:   Airway: Mallampati: III  TM distance: >3 FB   Neck ROM: full  Mouth opening: > = 3 FB   Dental:    (+) upper dentures and partials      Pulmonary: breath sounds clear to auscultation  (+) COPD:  shortness of breath:                             Cardiovascular:    (+) hypertension:, valvular problems/murmurs: MVP, past MI: > 6 months and no interval change, HOLLAND:,       ECG reviewed  Rhythm: regular  Rate: normal  Echocardiogram reviewed               ROS comment: Left ventricle is normal in size. Global left ventricular systolic function is normal. Calculated EF via heart model is 56 %. Mild left ventricular hypertrophy. Neuro/Psych:   (+) neuromuscular disease:, psychiatric history:            GI/Hepatic/Renal:   (+) hiatal hernia, GERD:, PUD, bowel prep,           Endo/Other: Negative Endo/Other ROS                    Abdominal:             Vascular: Other Findings:             Anesthesia Plan      MAC     ASA 3       Induction: intravenous. MIPS: Prophylactic antiemetics administered. Anesthetic plan and risks discussed with patient. Plan discussed with CRNA.                     Katiana San MD   10/10/2023

## 2023-10-10 NOTE — PLAN OF CARE
GI Plan:    Pt is scheduled for EGD-time to be determined   NO updated to provide at this time-awaiting diagnostics from endo.

## 2023-10-10 NOTE — PLAN OF CARE
Problem: Pain  Goal: Verbalizes/displays adequate comfort level or baseline comfort level  Outcome: Progressing     Problem: Safety - Adult  Goal: Free from fall injury  Outcome: Progressing     Problem: Skin/Tissue Integrity  Goal: Absence of new skin breakdown  Description: 1. Monitor for areas of redness and/or skin breakdown  2. Assess vascular access sites hourly  3. Every 4-6 hours minimum:  Change oxygen saturation probe site  4. Every 4-6 hours:  If on nasal continuous positive airway pressure, respiratory therapy assess nares and determine need for appliance change or resting period.   Outcome: Progressing     Problem: Discharge Planning  Goal: Discharge to home or other facility with appropriate resources  Outcome: Progressing  Flowsheets (Taken 10/10/2023 1551)  Discharge to home or other facility with appropriate resources:   Identify barriers to discharge with patient and caregiver   Arrange for needed discharge resources and transportation as appropriate

## 2023-10-10 NOTE — PROGRESS NOTES
[facility]  Office: [phone]  Karan Penny, DO, Chet Falk, DO, Erich Jaramillorose Blood, DO, Avery Medrano MD, Ismael Grullon MD, King Valdez MD, Anai Gee MD,  Nela Lovelace MD, Lewis Nolan MD, Lynn Stevens, DO, Marti Hurtado MD,  Zack Alfred MD, Carlos Tucker MD, Kaela Willoughby, DO, Rogelio Smith MD,  Sharon Khan, DO, Silviano Pagan MD, Jeferson Beth MD, Woo Pittman MD, Jordana Dumas MD,  Ceasar Farias MD, Selam Angeles MD, Griselda Pickard MD, Adelina Cardozo MD, Heri Harris DO, Edwardo Wang MD,  Evy Orr MD, Kacy Morton MD, Ebenezer Buchanan, CNP,  Malcom Heimlich, CNP,, Yina Barksdale, CNP,  Magaly Montanez, [facility], Figueroa Thayer, CNP, Jayy Sims, CNP, Bert Mo, CNP, Raymond Lovelace, CNP, Carlos Rose, CNP, Roman Lucero, CNP, Shreyas Vigil, CNS, Camelia Carcamo, CNP, Luh Chadwick, [address]    Progress Note    10/10/2023    7:40 AM    Name:   Alysha Patel  MRN:     [id number]     Acct:      [de-identified]   Room:   [address] [place] [address] Day:  2  Admit Date:  10/7/2023 12:33 PM    PCP:   No primary care provider on file. Code Status:  Full Code    Subjective:     C/C:   Chief Complaint   Patient presents with    Emesis    Abdominal Pain     Interval History Status: improved. Patient evaluated in room resting in bed. No acute events overnight. Labs stable. Doing well overall, currently NPO. Per GI plan to have EGD with dilatation today. Patient supposed to have outpatient f/u for breast lesion tomorrow. Brief History:     60-year-old female presents to the emergency department for evaluation of coffee-ground emesis with epigastric pain. Has history of GOO and gastric ulceration.   Hemoglobin continues to uptrend without intervention    Review of Systems:     Constitutional:  negative for chills, fevers, sweats  Respiratory:

## 2023-10-10 NOTE — PROGRESS NOTES
Writer spoke with son Darian Parsons, son unhappy that he has not had any communication with GI staff, reports he has requested to be called and updated daily.  Writer sent a message to Porsha Gomes CNP for GI informing of son's request. Electronically signed by Olga Chase RN on 10/10/2023 at 11:15 AM

## 2023-10-11 VITALS
HEIGHT: 62 IN | HEART RATE: 75 BPM | RESPIRATION RATE: 18 BRPM | BODY MASS INDEX: 33.13 KG/M2 | SYSTOLIC BLOOD PRESSURE: 123 MMHG | DIASTOLIC BLOOD PRESSURE: 62 MMHG | TEMPERATURE: 98 F | WEIGHT: 180 LBS | OXYGEN SATURATION: 92 %

## 2023-10-11 LAB
ANION GAP SERPL CALCULATED.3IONS-SCNC: 9 MMOL/L (ref 9–16)
BUN SERPL-MCNC: 5 MG/DL (ref 8–23)
CALCIUM SERPL-MCNC: 8.9 MG/DL (ref 8.6–10.4)
CHLORIDE SERPL-SCNC: 103 MMOL/L (ref 98–107)
CO2 SERPL-SCNC: 25 MMOL/L (ref 20–31)
CREAT SERPL-MCNC: 0.8 MG/DL (ref 0.5–0.9)
ERYTHROCYTE [DISTWIDTH] IN BLOOD BY AUTOMATED COUNT: 15 % (ref 11.8–14.4)
GFR SERPL CREATININE-BSD FRML MDRD: >60 ML/MIN/1.73M2
GLUCOSE SERPL-MCNC: 98 MG/DL (ref 74–99)
HCT VFR BLD AUTO: 36.5 % (ref 36.3–47.1)
HGB BLD-MCNC: 11.2 G/DL (ref 11.9–15.1)
MCH RBC QN AUTO: 30.4 PG (ref 25.2–33.5)
MCHC RBC AUTO-ENTMCNC: 30.7 G/DL (ref 28.4–34.8)
MCV RBC AUTO: 99.2 FL (ref 82.6–102.9)
NRBC BLD-RTO: 0 PER 100 WBC
PLATELET # BLD AUTO: 195 K/UL (ref 138–453)
PMV BLD AUTO: 10.2 FL (ref 8.1–13.5)
POTASSIUM SERPL-SCNC: 4 MMOL/L (ref 3.7–5.3)
RBC # BLD AUTO: 3.68 M/UL (ref 3.95–5.11)
SODIUM SERPL-SCNC: 137 MMOL/L (ref 136–145)
WBC OTHER # BLD: 4.9 K/UL (ref 3.5–11.3)

## 2023-10-11 PROCEDURE — 85027 COMPLETE CBC AUTOMATED: CPT

## 2023-10-11 PROCEDURE — 1200000000 HC SEMI PRIVATE

## 2023-10-11 PROCEDURE — 6360000002 HC RX W HCPCS: Performed by: INTERNAL MEDICINE

## 2023-10-11 PROCEDURE — 6370000000 HC RX 637 (ALT 250 FOR IP): Performed by: INTERNAL MEDICINE

## 2023-10-11 PROCEDURE — 36415 COLL VENOUS BLD VENIPUNCTURE: CPT

## 2023-10-11 PROCEDURE — 80048 BASIC METABOLIC PNL TOTAL CA: CPT

## 2023-10-11 PROCEDURE — 99239 HOSP IP/OBS DSCHRG MGMT >30: CPT | Performed by: NURSE PRACTITIONER

## 2023-10-11 PROCEDURE — 6360000002 HC RX W HCPCS: Performed by: NURSE PRACTITIONER

## 2023-10-11 PROCEDURE — 2580000003 HC RX 258: Performed by: INTERNAL MEDICINE

## 2023-10-11 PROCEDURE — 6370000000 HC RX 637 (ALT 250 FOR IP): Performed by: NURSE PRACTITIONER

## 2023-10-11 RX ORDER — ENOXAPARIN SODIUM 100 MG/ML
40 INJECTION SUBCUTANEOUS DAILY
Status: DISCONTINUED | OUTPATIENT
Start: 2023-10-11 | End: 2023-10-12 | Stop reason: HOSPADM

## 2023-10-11 RX ORDER — PANTOPRAZOLE SODIUM 40 MG/1
40 TABLET, DELAYED RELEASE ORAL
Status: DISCONTINUED | OUTPATIENT
Start: 2023-10-11 | End: 2023-10-12 | Stop reason: HOSPADM

## 2023-10-11 RX ORDER — CLONAZEPAM 0.5 MG/1
0.5 TABLET ORAL 3 TIMES DAILY PRN
Qty: 5 TABLET | Refills: 0 | Status: SHIPPED | OUTPATIENT
Start: 2023-10-11 | End: 2023-10-16

## 2023-10-11 RX ORDER — CALCIUM CARBONATE 500 MG/1
500 TABLET, CHEWABLE ORAL 3 TIMES DAILY PRN
Status: DISCONTINUED | OUTPATIENT
Start: 2023-10-11 | End: 2023-10-12 | Stop reason: HOSPADM

## 2023-10-11 RX ADMIN — TAMSULOSIN HYDROCHLORIDE 0.4 MG: 0.4 CAPSULE ORAL at 09:00

## 2023-10-11 RX ADMIN — OXYCODONE AND ACETAMINOPHEN 1 TABLET: 5; 325 TABLET ORAL at 06:11

## 2023-10-11 RX ADMIN — ANTACID TABLETS 500 MG: 500 TABLET, CHEWABLE ORAL at 17:07

## 2023-10-11 RX ADMIN — ESCITALOPRAM 20 MG: 10 TABLET, FILM COATED ORAL at 09:00

## 2023-10-11 RX ADMIN — CETIRIZINE HYDROCHLORIDE 10 MG: 10 TABLET ORAL at 09:00

## 2023-10-11 RX ADMIN — OXYCODONE AND ACETAMINOPHEN 1 TABLET: 5; 325 TABLET ORAL at 14:52

## 2023-10-11 RX ADMIN — ONDANSETRON 4 MG: 4 TABLET, ORALLY DISINTEGRATING ORAL at 23:52

## 2023-10-11 RX ADMIN — CLONAZEPAM 0.5 MG: 1 TABLET ORAL at 09:11

## 2023-10-11 RX ADMIN — METOPROLOL SUCCINATE 25 MG: 25 TABLET, FILM COATED, EXTENDED RELEASE ORAL at 09:01

## 2023-10-11 RX ADMIN — HYPROMELLOSE 2910 1 DROP: 5 SOLUTION/ DROPS OPHTHALMIC at 14:03

## 2023-10-11 RX ADMIN — HYPROMELLOSE 2910 1 DROP: 5 SOLUTION/ DROPS OPHTHALMIC at 09:00

## 2023-10-11 RX ADMIN — BENZTROPINE MESYLATE 0.5 MG: 0.5 TABLET ORAL at 20:47

## 2023-10-11 RX ADMIN — ONDANSETRON 4 MG: 4 TABLET, ORALLY DISINTEGRATING ORAL at 11:38

## 2023-10-11 RX ADMIN — Medication 1 CAPSULE: at 09:00

## 2023-10-11 RX ADMIN — ONDANSETRON 4 MG: 2 INJECTION INTRAMUSCULAR; INTRAVENOUS at 06:11

## 2023-10-11 RX ADMIN — PANTOPRAZOLE SODIUM 40 MG: 40 TABLET, DELAYED RELEASE ORAL at 14:00

## 2023-10-11 RX ADMIN — OXYCODONE AND ACETAMINOPHEN 1 TABLET: 5; 325 TABLET ORAL at 10:34

## 2023-10-11 RX ADMIN — GABAPENTIN 300 MG: 300 CAPSULE ORAL at 14:03

## 2023-10-11 RX ADMIN — Medication 1 CAPSULE: at 20:47

## 2023-10-11 RX ADMIN — QUETIAPINE FUMARATE 100 MG: 100 TABLET ORAL at 09:00

## 2023-10-11 RX ADMIN — FERROUS SULFATE TAB EC 325 MG (65 MG FE EQUIVALENT) 325 MG: 325 (65 FE) TABLET DELAYED RESPONSE at 09:00

## 2023-10-11 RX ADMIN — POLYETHYLENE GLYCOL 3350 17 G: 17 POWDER, FOR SOLUTION ORAL at 11:38

## 2023-10-11 RX ADMIN — OXYCODONE AND ACETAMINOPHEN 1 TABLET: 5; 325 TABLET ORAL at 23:52

## 2023-10-11 RX ADMIN — BUSPIRONE HYDROCHLORIDE 10 MG: 10 TABLET ORAL at 20:47

## 2023-10-11 RX ADMIN — FLUTICASONE PROPIONATE 1 SPRAY: 50 SPRAY, METERED NASAL at 09:00

## 2023-10-11 RX ADMIN — AMITRIPTYLINE HYDROCHLORIDE 25 MG: 25 TABLET, FILM COATED ORAL at 20:47

## 2023-10-11 RX ADMIN — ATORVASTATIN CALCIUM 40 MG: 80 TABLET, FILM COATED ORAL at 20:47

## 2023-10-11 RX ADMIN — HYPROMELLOSE 2910 1 DROP: 5 SOLUTION/ DROPS OPHTHALMIC at 20:47

## 2023-10-11 RX ADMIN — AMITRIPTYLINE HYDROCHLORIDE 25 MG: 25 TABLET, FILM COATED ORAL at 14:03

## 2023-10-11 RX ADMIN — GABAPENTIN 300 MG: 300 CAPSULE ORAL at 09:00

## 2023-10-11 RX ADMIN — GABAPENTIN 300 MG: 300 CAPSULE ORAL at 20:47

## 2023-10-11 RX ADMIN — SODIUM CHLORIDE, PRESERVATIVE FREE 10 ML: 5 INJECTION INTRAVENOUS at 08:24

## 2023-10-11 RX ADMIN — ENOXAPARIN SODIUM 40 MG: 100 INJECTION SUBCUTANEOUS at 10:48

## 2023-10-11 RX ADMIN — BUSPIRONE HYDROCHLORIDE 10 MG: 10 TABLET ORAL at 09:00

## 2023-10-11 RX ADMIN — AMITRIPTYLINE HYDROCHLORIDE 25 MG: 25 TABLET, FILM COATED ORAL at 09:00

## 2023-10-11 RX ADMIN — Medication 1000 MCG: at 09:00

## 2023-10-11 RX ADMIN — BENZTROPINE MESYLATE 0.5 MG: 0.5 TABLET ORAL at 09:00

## 2023-10-11 RX ADMIN — TRAZODONE HYDROCHLORIDE 100 MG: 100 TABLET ORAL at 20:47

## 2023-10-11 ASSESSMENT — PAIN SCALES - GENERAL
PAINLEVEL_OUTOF10: 7
PAINLEVEL_OUTOF10: 6
PAINLEVEL_OUTOF10: 1
PAINLEVEL_OUTOF10: 8
PAINLEVEL_OUTOF10: 7
PAINLEVEL_OUTOF10: 0

## 2023-10-11 ASSESSMENT — PAIN DESCRIPTION - DESCRIPTORS
DESCRIPTORS: ACHING;HEAVINESS
DESCRIPTORS: ACHING;HEAVINESS
DESCRIPTORS: DISCOMFORT
DESCRIPTORS: ACHING

## 2023-10-11 ASSESSMENT — PAIN DESCRIPTION - LOCATION
LOCATION: ABDOMEN
LOCATION: ABDOMEN
LOCATION: ABDOMEN;STERNUM
LOCATION: ABDOMEN;STERNUM

## 2023-10-11 ASSESSMENT — PAIN DESCRIPTION - ORIENTATION
ORIENTATION: MID
ORIENTATION: MID;LOWER

## 2023-10-11 NOTE — PROGRESS NOTES
Report called to [facility], writer spoke with Mega Wheatley, report given. Pt to be picked up at 14:30 for transport.  Electronically signed by Edilma Huynh RN on 10/11/2023 at 2:28 PM

## 2023-10-11 NOTE — PROGRESS NOTES
[facility]  Office: [phone]  Felipe Cowart DO, Sadie Jensen, DO, Severiano Silvers, MD, Vin Walker MD, Abel Edmondson MD, Cinthya Abebe MD,  Dileep Milton MD, Zain Lombardi MD, Taj Hernandez DO, Kalie Mckenzie MD,  Madhuri Ferraro MD, Carlos Leary MD, Dominic High DO, Skyla Major MD,  Bruna Malave DO, Isabela Martel MD, Matt Gamino MD, Estephania Davies MD, Blair Huggins MD,  Todd Baeza MD, Pilar Reynolds MD, Isadora Mina MD, Magalie Love MD, Kathleen Soler DO, Vivian Delatorre MD,  Louisa Villa MD, Rakesh Nazario MD, Abena Lindsay, CNP,  Roxie Kraus, CNP,, Artemio Thapa, CNP,  Jeff Villasenor, [facility], Tito Alpers, CNP, Gilda Cabrera, CNP, Etta Jung, CNP, N[place] Lr, CNP, Nathen Singh, CNP, Angela Hernandez, CNP, Shaheen Soriano, CNS, Sandy Perdomo, CNP, Severo Forts, [facility] [address] [facility]    Progress Note    10/11/2023    8:50 AM    Name:   Carmel Duane  MRN:     [id number]     Acct:      [de-identified]   Room:   [address] [place] [address] Day:  3  Admit Date:  10/7/2023 12:33 PM    PCP:   No primary care provider on file. Code Status:  Full Code    Subjective:     C/C:   Chief Complaint   Patient presents with    Emesis    Abdominal Pain     Interval History Status: improved. Seen and evaluated in room resting in bed. Had EGD with dilation completed yesterday. Still endorses some pain. Tolerating oral intake well. Stable for discharge    Brief History:     79-year-old female presents to the emergency department for evaluation of coffee-ground emesis with epigastric pain. Has history of GOO and gastric ulceration.   Hemoglobin continues to uptrend without intervention    Review of Systems:     Constitutional:  negative for chills, fevers, sweats  Respiratory:  negative for cough, dyspnea on exertion, shortness of breath,

## 2023-10-11 NOTE — PLAN OF CARE
Problem: Pain  Goal: Verbalizes/displays adequate comfort level or baseline comfort level  10/11/2023 0434 by Xena Stearns RN  Outcome: Progressing  10/10/2023 1847 by Brooke Walker RN  Outcome: Progressing     Problem: Safety - Adult  Goal: Free from fall injury  10/11/2023 0434 by Xena Stearns RN  Outcome: Progressing  10/10/2023 1847 by Brooke Walker RN  Outcome: Progressing     Problem: Skin/Tissue Integrity  Goal: Absence of new skin breakdown  Description: 1. Monitor for areas of redness and/or skin breakdown  2. Assess vascular access sites hourly  3. Every 4-6 hours minimum:  Change oxygen saturation probe site  4. Every 4-6 hours:  If on nasal continuous positive airway pressure, respiratory therapy assess nares and determine need for appliance change or resting period.   10/11/2023 0434 by Xena Stearns RN  Outcome: Progressing  10/10/2023 1847 by Brooke Walker RN  Outcome: Progressing     Problem: Discharge Planning  Goal: Discharge to home or other facility with appropriate resources  10/11/2023 0434 by Xena Stearns RN  Outcome: Progressing  10/10/2023 1847 by Brooke Walker RN  Outcome: Progressing  Flowsheets (Taken 10/10/2023 0817)  Discharge to home or other facility with appropriate resources:   Identify barriers to discharge with patient and caregiver   Arrange for needed discharge resources and transportation as appropriate

## 2023-10-11 NOTE — DISCHARGE SUMMARY
[facility]  Office: [phone]  1826, DO, Jessy Juarez, DO, Pavan Srinivasan DO, Ryan Jensen, DO, Lary Corral MD, Ayaka Tinsley MD, Lukas Higuera MD, Sage Reno MD,  Tequila Sahu MD, Kaiden Guzman MD, Crystal Kenney, DO, Vishal Tiwari MD,  Promise Park MD, Magaly Sosa MD, Lavelle Larson DO, Francesca Vaughan MD,  Tiarra Mitchell DO, Ulysses Sprinkle, MD, Shameka Crowder MD, Laurie Finnegan MD, Louise Santos MD,  Colt Vasquez MD, Dionte Rand MD, Annemarie Judd MD, Dipak Guidry MD, Cindy Olivo DO, Daryl Fisher MD,  Mirella Ricci MD, Triny Clarke MD, Angela Cooper, CNP,  Francis Rich, CNP,, Ingrid Cheney, CNP,  Chantel Urbina, [facility], Sameer Shoulder, CNP, Juan Bolaños, CNP, Mariella Allegra, CNP, Angeline Lopes, CNP, Tran Lemos, CNP, Marni Harper, CNP, Christa Gamma, CNS, Rad Plate, CNP, Enrrique Rhein, [address] [facility]    Discharge Summary     Patient ID: Linton Duverney  :  1945   MRN: [id number]     ACCOUNT:  [de-identified]   Patient's PCP: No primary care provider on file. Admit Date: 10/7/2023   Discharge Date: 10/12/23  Length of Stay: 3  Code Status:  Full Code  Admitting Physician: Vishal Tiwari MD  Discharge Physician: MARGOTH Garcia NP     Active Discharge Diagnoses:     Hospital Problem Lists:  Principal Problem (Resolved):     Coffee ground emesis  Active Problems:    COPD (chronic obstructive pulmonary disease) (HCC)    GERD (gastroesophageal reflux disease)    Anxiety    H/O gastric ulcer    Hyperlipidemia    Essential hypertension    Class 1 obesity in adult  Resolved Problems:    Intractable abdominal pain    Acute respiratory failure with hypoxia (HCC)    Abdominal pain      Admission Condition:  fair     Discharged Condition: stable    Hospital Stay:     Hospital Course:  Linton Duverney is a 66 y.o. female who was admitted for gastroesophageal reflux. 2. Small hiatal hernia. 3. Please note the exam was suboptimal as the patient was unable to stand, so the exam had to be performed with the patient supine and head of the table tilted upright 40 degrees. CT CHEST ABDOMEN PELVIS W CONTRAST Additional Contrast? None    Result Date: 10/7/2023  Moderate hiatal hernia and lower esophagus is patulous containing fluid. Consider follow-up GI consultation 3 cm spiculated and lobulated soft tissue mass in the left breast.  Findings are suspicious for malignancy. Please refer to recent mammogram and sonogram report of 08/31/2023 No intra-abdominal or intrapelvic abnormalities are noted. XR ABDOMEN (KUB) (SINGLE AP VIEW)    Result Date: 10/7/2023  No evidence of pneumoperitoneum. XR CHEST PORTABLE    Result Date: 10/7/2023  New opacification in the lateral aspect of the left lung base which may represent pneumonia. Consultations:    Consults:     Final Specialist Recommendations/Findings:   IP CONSULT TO GI  IP CONSULT TO INTERNAL MEDICINE  IP CONSULT TO GI      The patient was seen and examined on day of discharge and this discharge summary is in conjunction with any daily progress note from day of discharge. Discharge plan:     Disposition: snf    Physician Follow Up:     Bhanu Rubio DO  [address]  [phone]    Schedule an appointment as soon as possible for a visit  for follow up after hospitalization    Jamar Shabazz MD  [address] [place] [address]  [phone]    Schedule an appointment as soon as possible for a visit         Requiring Further Evaluation/Follow Up POST HOSPITALIZATION/Incidental Findings: follow up with GI in 3 weeks.   Continued coordinated care with your oncologist regarding a left breast mass and biopsy as outpatient    Diet: regular diet and soft    Activity: As tolerated    Instructions to Patient:     Discharge Medications:

## 2023-10-11 NOTE — CARE COORDINATION
Transitional Planning:  Paperwork faxed to [address] [place] [address] for transport today. Call to [facility] at [facility] [phone]. To informed we are awaiting transport time for today. Report [phone]  Fax:  [phone].    11:46  Transport 2:30 today. Nurse Sienna S, [facility] informed. PS team for AVS.    12:59  [address] [place] [address] E faxed to [facility].    15:30  Crew here to  patient in wheelchair. Nurse states patient cannot walk and pt wanted to know why she wasn't going by ambulance. Cm explained that patient is a wheelchair user. Nurse  states the crew left. Attempted to arrange ambulance with [facility], they are not able to transport due to patient being wheelchair user. They would not be able to bill. Call back to [address] [place] [address]. They do not have a ambulette until 12:30 PM.  Call to [facility]. They will cb if able to accept that late. They can accept at 12:30. [facility] informed. New paperwork faxed to [address] [place] [address].

## 2023-10-12 NOTE — PROGRESS NOTES
Report called to [facility]. Pt transported via [address], all personal belongings brought by the patient.

## 2023-10-16 ENCOUNTER — HOSPITAL ENCOUNTER (OUTPATIENT)
Dept: MAMMOGRAPHY | Age: 78
Discharge: HOME | End: 2023-10-18
Payer: MEDICARE

## 2023-10-16 ENCOUNTER — HOSPITAL ENCOUNTER (OUTPATIENT)
Dept: ULTRASOUND IMAGING | Age: 78
Discharge: HOME | End: 2023-10-18
Payer: MEDICARE

## 2023-10-16 DIAGNOSIS — N63.20 MASS OF LEFT BREAST, UNSPECIFIED QUADRANT: ICD-10-CM

## 2023-10-16 DIAGNOSIS — Z98.890 STATUS POST BREAST BIOPSY: ICD-10-CM

## 2023-10-16 PROCEDURE — 88360 TUMOR IMMUNOHISTOCHEM/MANUAL: CPT

## 2023-10-16 PROCEDURE — 2500000003 HC RX 250 WO HCPCS

## 2023-10-16 PROCEDURE — 88305 TISSUE EXAM BY PATHOLOGIST: CPT

## 2023-10-16 PROCEDURE — 88342 IMHCHEM/IMCYTCHM 1ST ANTB: CPT

## 2023-10-16 PROCEDURE — 19083 BX BREAST 1ST LESION US IMAG: CPT

## 2023-10-19 LAB — SURGICAL PATHOLOGY REPORT: NORMAL

## 2023-10-24 ENCOUNTER — APPOINTMENT (OUTPATIENT)
Dept: CT IMAGING | Age: 78
End: 2023-10-24
Payer: MEDICARE

## 2023-10-24 ENCOUNTER — HOSPITAL ENCOUNTER (OUTPATIENT)
Age: 78
Setting detail: OBSERVATION
Discharge: HOME | End: 2023-10-26
Attending: EMERGENCY MEDICINE | Admitting: EMERGENCY MEDICINE
Payer: MEDICARE

## 2023-10-24 DIAGNOSIS — R93.89 ABNORMAL CT SCAN: ICD-10-CM

## 2023-10-24 DIAGNOSIS — R10.13 EPIGASTRIC PAIN: Primary | ICD-10-CM

## 2023-10-24 DIAGNOSIS — F41.9 ANXIETY: ICD-10-CM

## 2023-10-24 PROBLEM — R10.9 ABDOMINAL PAIN: Status: ACTIVE | Noted: 2023-10-24

## 2023-10-24 LAB
ALBUMIN SERPL-MCNC: 2.9 G/DL (ref 3.5–5.2)
ALBUMIN/GLOB SERPL: 0.9 {RATIO} (ref 1–2.5)
ALP SERPL-CCNC: 127 U/L (ref 35–104)
ALT SERPL-CCNC: 16 U/L (ref 5–33)
ANION GAP SERPL CALCULATED.3IONS-SCNC: 10 MMOL/L (ref 9–17)
AST SERPL-CCNC: 35 U/L
BASOPHILS # BLD: 0.04 K/UL (ref 0–0.2)
BASOPHILS NFR BLD: 1 % (ref 0–2)
BILIRUB SERPL-MCNC: 0.3 MG/DL (ref 0.3–1.2)
BUN SERPL-MCNC: 5 MG/DL (ref 8–23)
CALCIUM SERPL-MCNC: 8.4 MG/DL (ref 8.6–10.4)
CHLORIDE SERPL-SCNC: 103 MMOL/L (ref 98–107)
CO2 SERPL-SCNC: 24 MMOL/L (ref 20–31)
CREAT SERPL-MCNC: 0.7 MG/DL (ref 0.5–0.9)
EOSINOPHIL # BLD: 0.46 K/UL (ref 0–0.44)
EOSINOPHILS RELATIVE PERCENT: 9 % (ref 1–4)
ERYTHROCYTE [DISTWIDTH] IN BLOOD BY AUTOMATED COUNT: 14.6 % (ref 11.8–14.4)
GFR SERPL CREATININE-BSD FRML MDRD: >60 ML/MIN/1.73M2
GLUCOSE SERPL-MCNC: 97 MG/DL (ref 70–99)
HCT VFR BLD AUTO: 36.6 % (ref 36.3–47.1)
HGB BLD-MCNC: 10.9 G/DL (ref 11.9–15.1)
IMM GRANULOCYTES # BLD AUTO: 0.07 K/UL (ref 0–0.3)
IMM GRANULOCYTES NFR BLD: 1 %
LIPASE SERPL-CCNC: 11 U/L (ref 13–60)
LYMPHOCYTES NFR BLD: 1.12 K/UL (ref 1.1–3.7)
LYMPHOCYTES RELATIVE PERCENT: 21 % (ref 24–43)
MCH RBC QN AUTO: 30.4 PG (ref 25.2–33.5)
MCHC RBC AUTO-ENTMCNC: 29.8 G/DL (ref 28.4–34.8)
MCV RBC AUTO: 101.9 FL (ref 82.6–102.9)
MONOCYTES NFR BLD: 0.49 K/UL (ref 0.1–1.2)
MONOCYTES NFR BLD: 9 % (ref 3–12)
NEUTROPHILS NFR BLD: 59 % (ref 36–65)
NEUTS SEG NFR BLD: 3.07 K/UL (ref 1.5–8.1)
NRBC BLD-RTO: 0 PER 100 WBC
PLATELET # BLD AUTO: 256 K/UL (ref 138–453)
PMV BLD AUTO: 11 FL (ref 8.1–13.5)
POTASSIUM SERPL-SCNC: 4.2 MMOL/L (ref 3.7–5.3)
PROT SERPL-MCNC: 6 G/DL (ref 6.4–8.3)
RBC # BLD AUTO: 3.59 M/UL (ref 3.95–5.11)
RBC # BLD: ABNORMAL 10*6/UL
SODIUM SERPL-SCNC: 137 MMOL/L (ref 135–144)
TROPONIN I SERPL HS-MCNC: 25 NG/L (ref 0–14)
TROPONIN I SERPL HS-MCNC: 25 NG/L (ref 0–14)
WBC OTHER # BLD: 5.3 K/UL (ref 3.5–11.3)

## 2023-10-24 PROCEDURE — 93005 ELECTROCARDIOGRAM TRACING: CPT | Performed by: STUDENT IN AN ORGANIZED HEALTH CARE EDUCATION/TRAINING PROGRAM

## 2023-10-24 PROCEDURE — 83690 ASSAY OF LIPASE: CPT

## 2023-10-24 PROCEDURE — 84484 ASSAY OF TROPONIN QUANT: CPT

## 2023-10-24 PROCEDURE — 99285 EMERGENCY DEPT VISIT HI MDM: CPT

## 2023-10-24 PROCEDURE — 96374 THER/PROPH/DIAG INJ IV PUSH: CPT

## 2023-10-24 PROCEDURE — 6360000004 HC RX CONTRAST MEDICATION: Performed by: STUDENT IN AN ORGANIZED HEALTH CARE EDUCATION/TRAINING PROGRAM

## 2023-10-24 PROCEDURE — G0378 HOSPITAL OBSERVATION PER HR: HCPCS

## 2023-10-24 PROCEDURE — 85025 COMPLETE CBC W/AUTO DIFF WBC: CPT

## 2023-10-24 PROCEDURE — 96361 HYDRATE IV INFUSION ADD-ON: CPT

## 2023-10-24 PROCEDURE — 6360000002 HC RX W HCPCS: Performed by: STUDENT IN AN ORGANIZED HEALTH CARE EDUCATION/TRAINING PROGRAM

## 2023-10-24 PROCEDURE — 2580000003 HC RX 258: Performed by: STUDENT IN AN ORGANIZED HEALTH CARE EDUCATION/TRAINING PROGRAM

## 2023-10-24 PROCEDURE — 96375 TX/PRO/DX INJ NEW DRUG ADDON: CPT

## 2023-10-24 PROCEDURE — 80053 COMPREHEN METABOLIC PANEL: CPT

## 2023-10-24 PROCEDURE — 99222 1ST HOSP IP/OBS MODERATE 55: CPT | Performed by: INTERNAL MEDICINE

## 2023-10-24 PROCEDURE — 74177 CT ABD & PELVIS W/CONTRAST: CPT

## 2023-10-24 RX ORDER — BENZTROPINE MESYLATE 0.5 MG/1
0.5 TABLET ORAL 2 TIMES DAILY
Status: DISCONTINUED | OUTPATIENT
Start: 2023-10-24 | End: 2023-10-26 | Stop reason: HOSPADM

## 2023-10-24 RX ORDER — 0.9 % SODIUM CHLORIDE 0.9 %
500 INTRAVENOUS SOLUTION INTRAVENOUS ONCE
Status: COMPLETED | OUTPATIENT
Start: 2023-10-24 | End: 2023-10-24

## 2023-10-24 RX ORDER — ONDANSETRON 2 MG/ML
4 INJECTION INTRAMUSCULAR; INTRAVENOUS EVERY 6 HOURS PRN
Status: DISCONTINUED | OUTPATIENT
Start: 2023-10-24 | End: 2023-10-26 | Stop reason: HOSPADM

## 2023-10-24 RX ORDER — ACETAMINOPHEN 650 MG/1
650 SUPPOSITORY RECTAL EVERY 6 HOURS PRN
Status: DISCONTINUED | OUTPATIENT
Start: 2023-10-24 | End: 2023-10-26 | Stop reason: HOSPADM

## 2023-10-24 RX ORDER — TRAZODONE HYDROCHLORIDE 100 MG/1
100 TABLET ORAL NIGHTLY
Status: DISCONTINUED | OUTPATIENT
Start: 2023-10-24 | End: 2023-10-26 | Stop reason: HOSPADM

## 2023-10-24 RX ORDER — ACETAMINOPHEN 325 MG/1
650 TABLET ORAL EVERY 6 HOURS PRN
Status: DISCONTINUED | OUTPATIENT
Start: 2023-10-24 | End: 2023-10-26 | Stop reason: HOSPADM

## 2023-10-24 RX ORDER — POLYETHYLENE GLYCOL 3350 17 G/17G
17 POWDER, FOR SOLUTION ORAL DAILY PRN
Status: DISCONTINUED | OUTPATIENT
Start: 2023-10-24 | End: 2023-10-25 | Stop reason: SDUPTHER

## 2023-10-24 RX ORDER — BUSPIRONE HYDROCHLORIDE 10 MG/1
10 TABLET ORAL 2 TIMES DAILY
Status: DISCONTINUED | OUTPATIENT
Start: 2023-10-24 | End: 2023-10-26 | Stop reason: HOSPADM

## 2023-10-24 RX ORDER — LINEZOLID 600 MG/1
600 TABLET, FILM COATED ORAL EVERY 12 HOURS SCHEDULED
Status: DISCONTINUED | OUTPATIENT
Start: 2023-10-24 | End: 2023-10-25

## 2023-10-24 RX ORDER — MORPHINE SULFATE 4 MG/ML
4 INJECTION INTRAVENOUS ONCE
Status: COMPLETED | OUTPATIENT
Start: 2023-10-24 | End: 2023-10-24

## 2023-10-24 RX ORDER — ESCITALOPRAM OXALATE 10 MG/1
20 TABLET ORAL DAILY
Status: DISCONTINUED | OUTPATIENT
Start: 2023-10-24 | End: 2023-10-26 | Stop reason: HOSPADM

## 2023-10-24 RX ORDER — ONDANSETRON 4 MG/1
4 TABLET, ORALLY DISINTEGRATING ORAL EVERY 8 HOURS PRN
Status: DISCONTINUED | OUTPATIENT
Start: 2023-10-24 | End: 2023-10-26 | Stop reason: HOSPADM

## 2023-10-24 RX ORDER — CLONAZEPAM 0.5 MG/1
0.5 TABLET ORAL 3 TIMES DAILY PRN
Status: DISCONTINUED | OUTPATIENT
Start: 2023-10-24 | End: 2023-10-26 | Stop reason: HOSPADM

## 2023-10-24 RX ORDER — ONDANSETRON 2 MG/ML
4 INJECTION INTRAMUSCULAR; INTRAVENOUS ONCE
Status: COMPLETED | OUTPATIENT
Start: 2023-10-24 | End: 2023-10-24

## 2023-10-24 RX ORDER — GABAPENTIN 300 MG/1
300 CAPSULE ORAL 3 TIMES DAILY
Status: DISCONTINUED | OUTPATIENT
Start: 2023-10-24 | End: 2023-10-26 | Stop reason: HOSPADM

## 2023-10-24 RX ORDER — SODIUM CHLORIDE 0.9 % (FLUSH) 0.9 %
5-40 SYRINGE (ML) INJECTION EVERY 12 HOURS SCHEDULED
Status: DISCONTINUED | OUTPATIENT
Start: 2023-10-24 | End: 2023-10-26 | Stop reason: HOSPADM

## 2023-10-24 RX ORDER — ONDANSETRON 4 MG/1
4 TABLET, FILM COATED ORAL EVERY 8 HOURS PRN
Status: DISCONTINUED | OUTPATIENT
Start: 2023-10-24 | End: 2023-10-24

## 2023-10-24 RX ORDER — AMITRIPTYLINE HYDROCHLORIDE 25 MG/1
25 TABLET, FILM COATED ORAL 3 TIMES DAILY
Status: DISCONTINUED | OUTPATIENT
Start: 2023-10-24 | End: 2023-10-26 | Stop reason: HOSPADM

## 2023-10-24 RX ORDER — SODIUM CHLORIDE 9 MG/ML
INJECTION, SOLUTION INTRAVENOUS PRN
Status: DISCONTINUED | OUTPATIENT
Start: 2023-10-24 | End: 2023-10-26 | Stop reason: HOSPADM

## 2023-10-24 RX ORDER — SODIUM CHLORIDE 0.9 % (FLUSH) 0.9 %
5-40 SYRINGE (ML) INJECTION PRN
Status: DISCONTINUED | OUTPATIENT
Start: 2023-10-24 | End: 2023-10-26 | Stop reason: HOSPADM

## 2023-10-24 RX ORDER — SODIUM CHLORIDE 9 MG/ML
INJECTION, SOLUTION INTRAVENOUS CONTINUOUS
Status: DISCONTINUED | OUTPATIENT
Start: 2023-10-24 | End: 2023-10-26 | Stop reason: HOSPADM

## 2023-10-24 RX ORDER — METOPROLOL SUCCINATE 25 MG/1
25 TABLET, EXTENDED RELEASE ORAL DAILY
Status: DISCONTINUED | OUTPATIENT
Start: 2023-10-24 | End: 2023-10-26 | Stop reason: HOSPADM

## 2023-10-24 RX ORDER — QUETIAPINE FUMARATE 100 MG/1
100 TABLET, FILM COATED ORAL DAILY
Status: DISCONTINUED | OUTPATIENT
Start: 2023-10-24 | End: 2023-10-25 | Stop reason: SDUPTHER

## 2023-10-24 RX ADMIN — SODIUM CHLORIDE 500 ML: 9 INJECTION, SOLUTION INTRAVENOUS at 11:53

## 2023-10-24 RX ADMIN — ONDANSETRON 4 MG: 2 INJECTION INTRAMUSCULAR; INTRAVENOUS at 11:52

## 2023-10-24 RX ADMIN — IOPAMIDOL 75 ML: 755 INJECTION, SOLUTION INTRAVENOUS at 12:45

## 2023-10-24 RX ADMIN — MORPHINE SULFATE 4 MG: 4 INJECTION INTRAVENOUS at 11:52

## 2023-10-24 ASSESSMENT — ENCOUNTER SYMPTOMS
DIARRHEA: 0
ABDOMINAL PAIN: 1
VOMITING: 1
NAUSEA: 1
COUGH: 0
CONSTIPATION: 0
SORE THROAT: 0
SHORTNESS OF BREATH: 0
SINUS PRESSURE: 0
WHEEZING: 0
BACK PAIN: 0

## 2023-10-24 ASSESSMENT — PAIN - FUNCTIONAL ASSESSMENT: PAIN_FUNCTIONAL_ASSESSMENT: 0-10

## 2023-10-24 ASSESSMENT — PAIN SCALES - GENERAL
PAINLEVEL_OUTOF10: 0
PAINLEVEL_OUTOF10: 6

## 2023-10-24 NOTE — ED PROVIDER NOTES
[address] [place] [address] ED  Emergency Department Encounter  Emergency Medicine Resident     Pt Name:Erin Sanchez  MRN: [id number]  [facility] 1945  Date of evaluation: 10/24/23  PCP:  No primary care provider on file. Note Started: 11:32 AM EDT      CHIEF COMPLAINT       Chief Complaint   Patient presents with    Abdominal Pain       HISTORY OF PRESENT ILLNESS  (Location/Symptom, Timing/Onset, Context/Setting, Quality, Duration, Modifying Factors, Severity.)      Baljinder Dong is a 66 y.o. female who presents with abdominal pain. Patient states she has had abdominal pain in the epigastric region for the past couple days. Patient was recently seen and admitted for upper GI bleed. Patient was found to have possible gastric ulcer. Patient also underwent endoscopic esophageal dilation prior to discharge. Patient states she has been having continued pain. She also reports nausea and some mild vomiting. She denies any fevers, chest pain, shortness of breath. She denies any other complaints at this time. Of note: After second discussion with patient's son. Patient comes from local nursing facility. Is currently on cefepime IV and linezolid p.o. for a multidrug-resistant urinary tract infection.     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 Given patient's pain with recent procedure she would likely benefit from being seen by GI in the morning. Patient will be placed in ED observation so she can follow-up with GI. Her home antibiotics for urinary tract infection are continued. Patient admitted to ED observation in stable condition. ED Course as of 10/24/23 2053   Tue Oct 24, 2023   1204 EKG Interpretation   Interpreted by Oscar Cowan DO    Rhythm: normal sinus   Rate: normal  Axis: normal  Ectopy: none  Conduction: normal  ST Segments: normal  T Waves: flattening diffuse  Q Waves: none    Clinical Impression: diffuse T wave flattening [WK]   1421 Stable H and H [WK]   1421 Electrolytes appropriate [WK]   1421 Pt low protein noted as well as the increased alk  [WK]   3310 Given elevated troponin  [WK]      ED Course User Index  [WK] Oscar Cowan DO       PROCEDURES:  None    CONSULTS:  IP CONSULT TO GI    CRITICAL CARE:  There was significant risk of life threatening deterioration of patient's condition requiring my direct management. Critical care time 0 minutes, excluding any documented procedures. FINAL IMPRESSION      1. Epigastric pain          DISPOSITION / PLAN     DISPOSITION Admitted 10/24/2023 04:00:07 PM      PATIENT REFERRED TO:  No follow-up provider specified.     DISCHARGE MEDICATIONS:  New Prescriptions    No medications on file       Ruben Jose DO  Emergency Medicine Resident    (Please note that portions of thisnote were completed with a voice recognition program.  Efforts were made to edit the dictations but occasionally words are mis-transcribed.)        Ruben Jsoe DO  Resident  10/24/23 2054

## 2023-10-24 NOTE — H&P
new. Pelvis: No acute findings. Similar appearance of mild presacral edema. Peritoneum/Retroperitoneum: No free air or free fluid. The aorta is normal in caliber. The visceral branches are patent. No lymphadenopathy. Bones/Soft Tissues: Partially visualized left breast mass, as seen on prior CT imaging, and also likely corresponding to recent site of biopsy. No acute osseous abnormality identified. Partially visualized right hip arthroplasty. *Unless otherwise specified, incidental findings do not require dedicated imaging follow-up. 1.  Small pleural effusions and dependent atelectasis have increased in the interval. 2.  Partially visualized left breast mass, as seen on prior CT imaging and likely corresponding to site of recent biopsy. 3.  Moderate size hiatal hernia and findings suggestive of esophagitis, similar compared to prior exam.  Findings suggestive of gastritis and duodenitis appears new. There is also mild apparent long segment wall thickening in the rectum and mild caliber change that may be due to inflammation or spasm. LABS:  I have reviewed and interpreted all available lab results.   Labs Reviewed   CBC WITH AUTO DIFFERENTIAL - Abnormal; Notable for the following components:       Result Value    RBC 3.59 (*)     Hemoglobin 10.9 (*)     RDW 14.6 (*)     Lymphocytes % 21 (*)     Eosinophils % 9 (*)     Immature Granulocytes 1 (*)     Eosinophils Absolute 0.46 (*)     All other components within normal limits   COMPREHENSIVE METABOLIC PANEL - Abnormal; Notable for the following components:    BUN 5 (*)     Calcium 8.4 (*)     Total Protein 6.0 (*)     Albumin 2.9 (*)     Albumin/Globulin Ratio 0.9 (*)     Alkaline Phosphatase 127 (*)     AST 35 (*)     All other components within normal limits   LIPASE - Abnormal; Notable for the following components:    Lipase 11 (*)     All other components within normal limits   TROPONIN - Abnormal; Notable for the following components:    Troponin, High Sensitivity 25 (*)     All other components within normal limits   TROPONIN - Abnormal; Notable for the following components:    Troponin, High Sensitivity 25 (*)     All other components within normal limits   URINALYSIS WITH MICROSCOPIC       SCREENING TOOLS:    HEART Risk Score for Chest Pain Patients  History and Physical Exam Suspicion Level  (Nausea, Vomiting, Diaphoresis, Radiation, Exertion)  Slightly Suspicious (0 pts)  Moderately Suspicious (1 pt)  Highly Suspicious (2 pts)  EKG Interpretation  Normal (0 pts)  Non-Specific Repolarization Disturbance (1 pt)  Significant ST-Depression (2 pts)  Age of Patient (in years)  = 39 (0 pts)  46-64 (1 pt)  = 65 (2 pts)  Risk Factors  No Risk Factors (0 pts)  1-2 Risk Factors (1 pt)  = 3 Risk Factors (2 pts)  Risk Factors Include:  Hypercholesterolemia  Hypertension  Diabetes Mellitus  Cigarette smoking  Positive family history  Obesity  CAD  (SLE, CKDz, HIV, Cocaine abuse)  Troponin Levels  = Normal Limit (0 pts)  1-3 Times Normal Limit (1 pt)  > 3 Times Normal Limit (2 pts)  TOTAL:    Percent Risk for Major Adverse Cardiac Event (MACE)  0-3 pts indicates low risk for MACE   2.5% (DISCHARGE)   4-7 pts indicates moderate risk for MACE  20.3% (OBS)  8-10 pts indicates high risk for MACE  72.7% (EARLY INVASIVE TX)    CDU IMPRESSION / PLAN      Destiny Snyder is a 66 y.o. female who presents with complaints of abdominal pain from [facility] facility. Patient recently was diagnosed with an ileus. CT abdomen completed with results of esophagitis, moderate size hiatal hernia and gastritis and duodenitis.   Given clinical findings and presentation will admit for further observation, symptom management and inpatient consult to GI for further treatment recommendations    Inpatient consult GI-appreciate recommendations  Symptom management  Continue home medications and pain control  Monitor vitals, labs, and imaging  DISPO: pending consults and clinical

## 2023-10-25 ENCOUNTER — ANESTHESIA EVENT (OUTPATIENT)
Dept: OPERATING ROOM | Age: 78
End: 2023-10-25
Payer: MEDICARE

## 2023-10-25 ENCOUNTER — ANESTHESIA (OUTPATIENT)
Dept: OPERATING ROOM | Age: 78
End: 2023-10-25
Payer: MEDICARE

## 2023-10-25 PROBLEM — K22.4 DIFFUSE ESOPHAGEAL SPASM: Status: ACTIVE | Noted: 2023-10-25

## 2023-10-25 LAB
BILIRUB UR QL STRIP: NEGATIVE
CLARITY UR: CLEAR
COLOR UR: YELLOW
COMMENT: ABNORMAL
EKG ATRIAL RATE: 71 BPM
EKG P AXIS: 34 DEGREES
EKG P-R INTERVAL: 158 MS
EKG Q-T INTERVAL: 424 MS
EKG QRS DURATION: 86 MS
EKG QTC CALCULATION (BAZETT): 460 MS
EKG R AXIS: 53 DEGREES
EKG T AXIS: -2 DEGREES
EKG VENTRICULAR RATE: 71 BPM
GLUCOSE UR STRIP-MCNC: NEGATIVE MG/DL
HGB UR QL STRIP.AUTO: NEGATIVE
KETONES UR STRIP-MCNC: ABNORMAL MG/DL
LEUKOCYTE ESTERASE UR QL STRIP: NEGATIVE
NITRITE UR QL STRIP: NEGATIVE
PH UR STRIP: 8 [PH] (ref 5–8)
PROT UR STRIP-MCNC: NEGATIVE MG/DL
SP GR UR STRIP: 1.01 (ref 1–1.03)
UROBILINOGEN UR STRIP-ACNC: NORMAL EU/DL (ref 0–1)

## 2023-10-25 PROCEDURE — 45331 SIGMOIDOSCOPY AND BIOPSY: CPT | Performed by: INTERNAL MEDICINE

## 2023-10-25 PROCEDURE — 6370000000 HC RX 637 (ALT 250 FOR IP): Performed by: EMERGENCY MEDICINE

## 2023-10-25 PROCEDURE — 6370000000 HC RX 637 (ALT 250 FOR IP): Performed by: STUDENT IN AN ORGANIZED HEALTH CARE EDUCATION/TRAINING PROGRAM

## 2023-10-25 PROCEDURE — 3700000000 HC ANESTHESIA ATTENDED CARE: Performed by: INTERNAL MEDICINE

## 2023-10-25 PROCEDURE — 3700000001 HC ADD 15 MINUTES (ANESTHESIA): Performed by: INTERNAL MEDICINE

## 2023-10-25 PROCEDURE — 6360000002 HC RX W HCPCS: Performed by: NURSE ANESTHETIST, CERTIFIED REGISTERED

## 2023-10-25 PROCEDURE — 96361 HYDRATE IV INFUSION ADD-ON: CPT

## 2023-10-25 PROCEDURE — 6370000000 HC RX 637 (ALT 250 FOR IP): Performed by: INTERNAL MEDICINE

## 2023-10-25 PROCEDURE — 2580000003 HC RX 258: Performed by: EMERGENCY MEDICINE

## 2023-10-25 PROCEDURE — 2580000003 HC RX 258: Performed by: STUDENT IN AN ORGANIZED HEALTH CARE EDUCATION/TRAINING PROGRAM

## 2023-10-25 PROCEDURE — 96375 TX/PRO/DX INJ NEW DRUG ADDON: CPT

## 2023-10-25 PROCEDURE — 6360000002 HC RX W HCPCS: Performed by: STUDENT IN AN ORGANIZED HEALTH CARE EDUCATION/TRAINING PROGRAM

## 2023-10-25 PROCEDURE — 6360000002 HC RX W HCPCS: Performed by: EMERGENCY MEDICINE

## 2023-10-25 PROCEDURE — 81003 URINALYSIS AUTO W/O SCOPE: CPT

## 2023-10-25 PROCEDURE — 2580000003 HC RX 258: Performed by: NURSE ANESTHETIST, CERTIFIED REGISTERED

## 2023-10-25 PROCEDURE — 93010 ELECTROCARDIOGRAM REPORT: CPT | Performed by: INTERNAL MEDICINE

## 2023-10-25 PROCEDURE — C9113 INJ PANTOPRAZOLE SODIUM, VIA: HCPCS | Performed by: EMERGENCY MEDICINE

## 2023-10-25 PROCEDURE — G0378 HOSPITAL OBSERVATION PER HR: HCPCS

## 2023-10-25 PROCEDURE — 7100000000 HC PACU RECOVERY - FIRST 15 MIN: Performed by: INTERNAL MEDICINE

## 2023-10-25 PROCEDURE — 6360000002 HC RX W HCPCS

## 2023-10-25 PROCEDURE — 3609008300 HC SIGMOIDOSCOPY W/BIOPSY SINGLE/MULTIPLE: Performed by: INTERNAL MEDICINE

## 2023-10-25 PROCEDURE — 88305 TISSUE EXAM BY PATHOLOGIST: CPT

## 2023-10-25 PROCEDURE — 2709999900 HC NON-CHARGEABLE SUPPLY: Performed by: INTERNAL MEDICINE

## 2023-10-25 PROCEDURE — A4216 STERILE WATER/SALINE, 10 ML: HCPCS | Performed by: EMERGENCY MEDICINE

## 2023-10-25 PROCEDURE — 7100000001 HC PACU RECOVERY - ADDTL 15 MIN: Performed by: INTERNAL MEDICINE

## 2023-10-25 RX ORDER — FENTANYL CITRATE 50 UG/ML
50 INJECTION, SOLUTION INTRAMUSCULAR; INTRAVENOUS
Status: DISCONTINUED | OUTPATIENT
Start: 2023-10-25 | End: 2023-10-25 | Stop reason: HOSPADM

## 2023-10-25 RX ORDER — QUETIAPINE FUMARATE 100 MG/1
100 TABLET, FILM COATED ORAL NIGHTLY
Status: DISCONTINUED | OUTPATIENT
Start: 2023-10-25 | End: 2023-10-26 | Stop reason: HOSPADM

## 2023-10-25 RX ORDER — TAMSULOSIN HYDROCHLORIDE 0.4 MG/1
0.4 CAPSULE ORAL DAILY
Status: DISCONTINUED | OUTPATIENT
Start: 2023-10-25 | End: 2023-10-26 | Stop reason: HOSPADM

## 2023-10-25 RX ORDER — ONDANSETRON 2 MG/ML
4 INJECTION INTRAMUSCULAR; INTRAVENOUS
Status: DISCONTINUED | OUTPATIENT
Start: 2023-10-25 | End: 2023-10-25 | Stop reason: HOSPADM

## 2023-10-25 RX ORDER — SODIUM CHLORIDE, SODIUM LACTATE, POTASSIUM CHLORIDE, CALCIUM CHLORIDE 600; 310; 30; 20 MG/100ML; MG/100ML; MG/100ML; MG/100ML
INJECTION, SOLUTION INTRAVENOUS CONTINUOUS PRN
Status: DISCONTINUED | OUTPATIENT
Start: 2023-10-25 | End: 2023-10-25 | Stop reason: SDUPTHER

## 2023-10-25 RX ORDER — OXYCODONE HYDROCHLORIDE AND ACETAMINOPHEN 5; 325 MG/1; MG/1
1 TABLET ORAL 3 TIMES DAILY
Status: DISCONTINUED | OUTPATIENT
Start: 2023-10-25 | End: 2023-10-26 | Stop reason: HOSPADM

## 2023-10-25 RX ORDER — FLUTICASONE PROPIONATE 50 MCG
1 SPRAY, SUSPENSION (ML) NASAL DAILY
Status: DISCONTINUED | OUTPATIENT
Start: 2023-10-25 | End: 2023-10-26 | Stop reason: HOSPADM

## 2023-10-25 RX ORDER — LACTULOSE 10 G/15ML
10 SOLUTION ORAL; RECTAL PRN
COMMUNITY

## 2023-10-25 RX ORDER — DAPTOMYCIN 50 MG/ML
300 INJECTION, POWDER, LYOPHILIZED, FOR SOLUTION INTRAVENOUS DAILY
COMMUNITY
End: 2023-10-30

## 2023-10-25 RX ORDER — LACTULOSE 10 G/15ML
10 SOLUTION ORAL PRN
Status: DISCONTINUED | OUTPATIENT
Start: 2023-10-25 | End: 2023-10-26 | Stop reason: HOSPADM

## 2023-10-25 RX ORDER — PANTOPRAZOLE SODIUM 40 MG/1
40 TABLET, DELAYED RELEASE ORAL 2 TIMES DAILY
Status: DISCONTINUED | OUTPATIENT
Start: 2023-10-25 | End: 2023-10-26 | Stop reason: HOSPADM

## 2023-10-25 RX ORDER — MIDAZOLAM HYDROCHLORIDE 2 MG/2ML
1 INJECTION, SOLUTION INTRAMUSCULAR; INTRAVENOUS EVERY 10 MIN PRN
Status: DISCONTINUED | OUTPATIENT
Start: 2023-10-25 | End: 2023-10-25 | Stop reason: HOSPADM

## 2023-10-25 RX ORDER — CHOLECALCIFEROL (VITAMIN D3) 125 MCG
1000 CAPSULE ORAL DAILY
Status: DISCONTINUED | OUTPATIENT
Start: 2023-10-25 | End: 2023-10-26 | Stop reason: HOSPADM

## 2023-10-25 RX ORDER — FENTANYL CITRATE 50 UG/ML
25 INJECTION, SOLUTION INTRAMUSCULAR; INTRAVENOUS EVERY 5 MIN PRN
Status: DISCONTINUED | OUTPATIENT
Start: 2023-10-25 | End: 2023-10-25 | Stop reason: HOSPADM

## 2023-10-25 RX ORDER — ALBUTEROL SULFATE 2.5 MG/3ML
2.5 SOLUTION RESPIRATORY (INHALATION) EVERY 6 HOURS PRN
Status: DISCONTINUED | OUTPATIENT
Start: 2023-10-25 | End: 2023-10-26 | Stop reason: HOSPADM

## 2023-10-25 RX ORDER — QUETIAPINE FUMARATE 100 MG/1
100 TABLET, FILM COATED ORAL NIGHTLY
COMMUNITY

## 2023-10-25 RX ORDER — SODIUM CHLORIDE 0.9 % (FLUSH) 0.9 %
5-40 SYRINGE (ML) INJECTION EVERY 12 HOURS SCHEDULED
Status: DISCONTINUED | OUTPATIENT
Start: 2023-10-25 | End: 2023-10-25 | Stop reason: HOSPADM

## 2023-10-25 RX ORDER — SODIUM CHLORIDE, SODIUM LACTATE, POTASSIUM CHLORIDE, CALCIUM CHLORIDE 600; 310; 30; 20 MG/100ML; MG/100ML; MG/100ML; MG/100ML
INJECTION, SOLUTION INTRAVENOUS CONTINUOUS
Status: DISCONTINUED | OUTPATIENT
Start: 2023-10-25 | End: 2023-10-25 | Stop reason: HOSPADM

## 2023-10-25 RX ORDER — LIDOCAINE HYDROCHLORIDE 10 MG/ML
1 INJECTION, SOLUTION INFILTRATION; PERINEURAL
Status: DISCONTINUED | OUTPATIENT
Start: 2023-10-25 | End: 2023-10-25 | Stop reason: HOSPADM

## 2023-10-25 RX ORDER — SODIUM CHLORIDE 0.9 % (FLUSH) 0.9 %
5-40 SYRINGE (ML) INJECTION PRN
Status: DISCONTINUED | OUTPATIENT
Start: 2023-10-25 | End: 2023-10-25 | Stop reason: HOSPADM

## 2023-10-25 RX ORDER — QUETIAPINE FUMARATE 25 MG/1
50 TABLET, FILM COATED ORAL DAILY
Status: DISCONTINUED | OUTPATIENT
Start: 2023-10-25 | End: 2023-10-26 | Stop reason: HOSPADM

## 2023-10-25 RX ORDER — FENTANYL CITRATE 50 UG/ML
50 INJECTION, SOLUTION INTRAMUSCULAR; INTRAVENOUS EVERY 5 MIN PRN
Status: DISCONTINUED | OUTPATIENT
Start: 2023-10-25 | End: 2023-10-25 | Stop reason: HOSPADM

## 2023-10-25 RX ORDER — ATORVASTATIN CALCIUM 40 MG/1
40 TABLET, FILM COATED ORAL DAILY
Status: DISCONTINUED | OUTPATIENT
Start: 2023-10-25 | End: 2023-10-26 | Stop reason: HOSPADM

## 2023-10-25 RX ORDER — SODIUM CHLORIDE 0.9 % (FLUSH) 0.9 %
50 SYRINGE (ML) INJECTION DAILY
COMMUNITY

## 2023-10-25 RX ORDER — NITROFURANTOIN 25; 75 MG/1; MG/1
100 CAPSULE ORAL 2 TIMES DAILY
Qty: 20 CAPSULE | Refills: 0 | Status: SHIPPED | OUTPATIENT
Start: 2023-10-25 | End: 2023-11-04

## 2023-10-25 RX ORDER — DIPHENHYDRAMINE HYDROCHLORIDE 50 MG/ML
12.5 INJECTION INTRAMUSCULAR; INTRAVENOUS
Status: DISCONTINUED | OUTPATIENT
Start: 2023-10-25 | End: 2023-10-25 | Stop reason: HOSPADM

## 2023-10-25 RX ORDER — OXYCODONE HYDROCHLORIDE AND ACETAMINOPHEN 5; 325 MG/1; MG/1
1 TABLET ORAL 3 TIMES DAILY
COMMUNITY

## 2023-10-25 RX ORDER — SENNOSIDES A AND B 8.6 MG/1
1 TABLET, FILM COATED ORAL PRN
COMMUNITY

## 2023-10-25 RX ORDER — SODIUM CHLORIDE 0.9 % (FLUSH) 0.9 %
50 SYRINGE (ML) INJECTION DAILY
Status: DISCONTINUED | OUTPATIENT
Start: 2023-10-25 | End: 2023-10-26 | Stop reason: HOSPADM

## 2023-10-25 RX ORDER — FENTANYL CITRATE 50 UG/ML
INJECTION, SOLUTION INTRAMUSCULAR; INTRAVENOUS
Status: COMPLETED
Start: 2023-10-25 | End: 2023-10-25

## 2023-10-25 RX ORDER — LACTOBACILLUS RHAMNOSUS GG 10B CELL
1 CAPSULE ORAL 2 TIMES DAILY
Status: DISCONTINUED | OUTPATIENT
Start: 2023-10-25 | End: 2023-10-26 | Stop reason: HOSPADM

## 2023-10-25 RX ORDER — SENNOSIDES A AND B 8.6 MG/1
1 TABLET, FILM COATED ORAL NIGHTLY PRN
Status: DISCONTINUED | OUTPATIENT
Start: 2023-10-25 | End: 2023-10-26 | Stop reason: HOSPADM

## 2023-10-25 RX ORDER — PROPOFOL 10 MG/ML
INJECTION, EMULSION INTRAVENOUS PRN
Status: DISCONTINUED | OUTPATIENT
Start: 2023-10-25 | End: 2023-10-25 | Stop reason: SDUPTHER

## 2023-10-25 RX ORDER — FENTANYL CITRATE 50 UG/ML
25 INJECTION, SOLUTION INTRAMUSCULAR; INTRAVENOUS
Status: DISCONTINUED | OUTPATIENT
Start: 2023-10-25 | End: 2023-10-25 | Stop reason: HOSPADM

## 2023-10-25 RX ORDER — CARBOXYMETHYLCELLULOSE SODIUM 10 MG/ML
1 GEL OPHTHALMIC EVERY 6 HOURS
Status: DISCONTINUED | OUTPATIENT
Start: 2023-10-25 | End: 2023-10-26 | Stop reason: HOSPADM

## 2023-10-25 RX ORDER — SODIUM CHLORIDE 9 MG/ML
INJECTION, SOLUTION INTRAVENOUS PRN
Status: DISCONTINUED | OUTPATIENT
Start: 2023-10-25 | End: 2023-10-25 | Stop reason: HOSPADM

## 2023-10-25 RX ORDER — LIDOCAINE HYDROCHLORIDE 20 MG/ML
INJECTION, SOLUTION INTRAVENOUS PRN
Status: DISCONTINUED | OUTPATIENT
Start: 2023-10-25 | End: 2023-10-25 | Stop reason: SDUPTHER

## 2023-10-25 RX ORDER — CETIRIZINE HYDROCHLORIDE 10 MG/1
10 TABLET ORAL DAILY
Status: DISCONTINUED | OUTPATIENT
Start: 2023-10-25 | End: 2023-10-26 | Stop reason: HOSPADM

## 2023-10-25 RX ORDER — ALBUTEROL SULFATE 2.5 MG/3ML
2.5 SOLUTION RESPIRATORY (INHALATION) EVERY 8 HOURS PRN
Status: DISCONTINUED | OUTPATIENT
Start: 2023-10-25 | End: 2023-10-25 | Stop reason: HOSPADM

## 2023-10-25 RX ORDER — POLYETHYLENE GLYCOL 3350 17 G/17G
17 POWDER, FOR SOLUTION ORAL DAILY PRN
Status: DISCONTINUED | OUTPATIENT
Start: 2023-10-25 | End: 2023-10-26 | Stop reason: HOSPADM

## 2023-10-25 RX ADMIN — QUETIAPINE FUMARATE 50 MG: 100 TABLET ORAL at 12:13

## 2023-10-25 RX ADMIN — GABAPENTIN 300 MG: 300 CAPSULE ORAL at 21:32

## 2023-10-25 RX ADMIN — SODIUM CHLORIDE, POTASSIUM CHLORIDE, SODIUM LACTATE AND CALCIUM CHLORIDE: 600; 310; 30; 20 INJECTION, SOLUTION INTRAVENOUS at 14:13

## 2023-10-25 RX ADMIN — METOPROLOL SUCCINATE 25 MG: 25 TABLET, EXTENDED RELEASE ORAL at 08:20

## 2023-10-25 RX ADMIN — LINEZOLID 600 MG: 600 TABLET, FILM COATED ORAL at 00:32

## 2023-10-25 RX ADMIN — Medication 1000 MCG: at 16:42

## 2023-10-25 RX ADMIN — BENZTROPINE MESYLATE 0.5 MG: 0.5 TABLET ORAL at 11:58

## 2023-10-25 RX ADMIN — FENTANYL CITRATE 50 MCG: 50 INJECTION, SOLUTION INTRAMUSCULAR; INTRAVENOUS at 14:51

## 2023-10-25 RX ADMIN — ATORVASTATIN CALCIUM 40 MG: 40 TABLET, FILM COATED ORAL at 17:06

## 2023-10-25 RX ADMIN — GABAPENTIN 300 MG: 300 CAPSULE ORAL at 00:10

## 2023-10-25 RX ADMIN — OXYCODONE AND ACETAMINOPHEN 1 TABLET: 5; 325 TABLET ORAL at 21:33

## 2023-10-25 RX ADMIN — CEFEPIME 2000 MG: 2 INJECTION, POWDER, FOR SOLUTION INTRAVENOUS at 03:48

## 2023-10-25 RX ADMIN — BENZTROPINE MESYLATE 0.5 MG: 0.5 TABLET ORAL at 21:31

## 2023-10-25 RX ADMIN — LINEZOLID 600 MG: 600 TABLET, FILM COATED ORAL at 11:58

## 2023-10-25 RX ADMIN — CEFEPIME 2000 MG: 2 INJECTION, POWDER, FOR SOLUTION INTRAVENOUS at 12:14

## 2023-10-25 RX ADMIN — BUSPIRONE HYDROCHLORIDE 10 MG: 10 TABLET ORAL at 21:32

## 2023-10-25 RX ADMIN — LIDOCAINE HYDROCHLORIDE 70 MG: 20 INJECTION INTRAVENOUS at 14:21

## 2023-10-25 RX ADMIN — Medication 1 CAPSULE: at 21:32

## 2023-10-25 RX ADMIN — SODIUM CHLORIDE, PRESERVATIVE FREE 10 ML: 5 INJECTION INTRAVENOUS at 00:48

## 2023-10-25 RX ADMIN — CLONAZEPAM 0.5 MG: 0.5 TABLET ORAL at 08:20

## 2023-10-25 RX ADMIN — TRAZODONE HYDROCHLORIDE 100 MG: 100 TABLET ORAL at 21:33

## 2023-10-25 RX ADMIN — QUETIAPINE FUMARATE 100 MG: 100 TABLET ORAL at 21:33

## 2023-10-25 RX ADMIN — CLONAZEPAM 0.5 MG: 0.5 TABLET ORAL at 17:08

## 2023-10-25 RX ADMIN — CARBOXYMETHYLCELLULOSE SODIUM 1 DROP: 10 GEL OPHTHALMIC at 17:07

## 2023-10-25 RX ADMIN — Medication 1 CAPSULE: at 16:42

## 2023-10-25 RX ADMIN — PANTOPRAZOLE SODIUM 40 MG: 40 INJECTION, POWDER, FOR SOLUTION INTRAVENOUS at 11:57

## 2023-10-25 RX ADMIN — BENZTROPINE MESYLATE 0.5 MG: 0.5 TABLET ORAL at 00:32

## 2023-10-25 RX ADMIN — GABAPENTIN 300 MG: 300 CAPSULE ORAL at 08:20

## 2023-10-25 RX ADMIN — PANTOPRAZOLE SODIUM 40 MG: 40 TABLET, DELAYED RELEASE ORAL at 21:31

## 2023-10-25 RX ADMIN — AMITRIPTYLINE HYDROCHLORIDE 25 MG: 25 TABLET, FILM COATED ORAL at 21:31

## 2023-10-25 RX ADMIN — TAMSULOSIN HYDROCHLORIDE 0.4 MG: 0.4 CAPSULE ORAL at 17:10

## 2023-10-25 RX ADMIN — FLUTICASONE PROPIONATE 1 SPRAY: 50 SPRAY, METERED NASAL at 17:09

## 2023-10-25 RX ADMIN — PROPOFOL 70 MG: 10 INJECTION, EMULSION INTRAVENOUS at 14:21

## 2023-10-25 RX ADMIN — CETIRIZINE HYDROCHLORIDE 10 MG: 10 TABLET ORAL at 17:07

## 2023-10-25 ASSESSMENT — PAIN - FUNCTIONAL ASSESSMENT
PAIN_FUNCTIONAL_ASSESSMENT: ACTIVITIES ARE NOT PREVENTED
PAIN_FUNCTIONAL_ASSESSMENT: 0-10

## 2023-10-25 ASSESSMENT — PAIN SCALES - GENERAL
PAINLEVEL_OUTOF10: 5
PAINLEVEL_OUTOF10: 7
PAINLEVEL_OUTOF10: 8
PAINLEVEL_OUTOF10: 5
PAINLEVEL_OUTOF10: 7
PAINLEVEL_OUTOF10: 7

## 2023-10-25 ASSESSMENT — PAIN DESCRIPTION - FREQUENCY
FREQUENCY: CONTINUOUS

## 2023-10-25 ASSESSMENT — PAIN DESCRIPTION - LOCATION
LOCATION: ABDOMEN

## 2023-10-25 ASSESSMENT — PAIN DESCRIPTION - ONSET
ONSET: ON-GOING
ONSET: GRADUAL

## 2023-10-25 ASSESSMENT — PAIN DESCRIPTION - ORIENTATION
ORIENTATION: UPPER
ORIENTATION: RIGHT

## 2023-10-25 ASSESSMENT — PAIN DESCRIPTION - DESCRIPTORS
DESCRIPTORS: PATIENT UNABLE TO DESCRIBE
DESCRIPTORS: SHARP
DESCRIPTORS: SHARP
DESCRIPTORS: ACHING

## 2023-10-25 ASSESSMENT — PAIN DESCRIPTION - PAIN TYPE
TYPE: ACUTE PAIN

## 2023-10-25 ASSESSMENT — ENCOUNTER SYMPTOMS: SHORTNESS OF BREATH: 1

## 2023-10-25 NOTE — ANESTHESIA POSTPROCEDURE EVALUATION
Department of Anesthesiology  Postprocedure Note    Patient: Travon Jean-Baptiste  MRN: [id number]  YOB: 1945  Date of evaluation: 10/25/2023      Procedure Summary     Date: 10/25/23 Room / Location: [address]    Anesthesia Start: 1973 Anesthesia Stop: 2411    Procedure: SIGMOIDOSCOPY BIOPSY FLEXIBLE Diagnosis:       Abnormal CT scan      (Abnormal CT scan [R93.89])    Surgeons: Gail Lanier MD Responsible Provider: Tamica Ramos MD    Anesthesia Type: MAC ASA Status: 3          Anesthesia Type: No value filed.     Alhaji Phase I: Alhaji Score: 9    Alhaji Phase II:        Anesthesia Post Evaluation    Patient location during evaluation: PACU  Patient participation: complete - patient participated  Level of consciousness: awake  Pain score: 1  Airway patency: patent  Nausea & Vomiting: no nausea and no vomiting  Complications: no  Cardiovascular status: blood pressure returned to baseline and hemodynamically stable  Respiratory status: acceptable  Hydration status: euvolemic  Pain management: adequate

## 2023-10-25 NOTE — DISCHARGE SUMMARY
CDU Discharge Summary        Patient:  Jarad Verde  YOB: 1945    MRN: [id number]   Acct: [de-identified]    Primary Care Physician: No primary care provider on file. Admit date:  10/24/2023 11:27 AM  Discharge date: 10/26/2023 12:15 PM     Discharge Diagnoses:     1.) Acute abdominal pain secondary to gastritis. Improved with IV hydration, analgesics, antiemetics and rest    Follow-up:  Call today/tomorrow for a follow up appointment with No primary care provider on file. , or return to the Emergency Room with worsening symptoms    Stressed to patient the importance of following up with primary care doctor for further workup/management of symptoms. Pt verbalizes understanding and agrees with plan. Discharge Medication Changes:       Medication List        START taking these medications      nitrofurantoin (macrocrystal-monohydrate) 100 MG capsule  Commonly known as: MACROBID  Take 1 capsule by mouth 2 times daily for 10 days            CHANGE how you take these medications      * clonazePAM 0.5 MG tablet  Commonly known as: KLONOPIN  Take 1 tablet by mouth 3 times daily as needed (tid) for up to 5 days. Max Daily Amount: 1.5 mg  What changed: Another medication with the same name was added. Make sure you understand how and when to take each. * clonazePAM 0.5 MG tablet  Commonly known as: KlonoPIN  Take 1 tablet by mouth 3 times daily as needed for Anxiety for up to 14 days. Max Daily Amount: 1.5 mg  What changed: You were already taking a medication with the same name, and this prescription was added. Make sure you understand how and when to take each. * oxyCODONE-acetaminophen 5-325 MG per tablet  Commonly known as: PERCOCET  What changed: Another medication with the same name was added. Make sure you understand how and when to take each.      * oxyCODONE-acetaminophen 5-325 MG per tablet  Commonly known as: Percocet  Take 1 tablet by mouth every 8 hours as needed for Pain for up to 14

## 2023-10-25 NOTE — ANESTHESIA PRE PROCEDURE
Department of Anesthesiology  Preprocedure Note       Name:  Steve Hudson   Age:  66 y.o.  :  1945                                          MRN:  [id number]         Date:  10/25/2023      Surgeon: Olivia Whitley):  Cynthia Rolon MD    Procedure: Procedure(s): FLEXIBLE SIGMOIDOSCOPY- GI SCHEDULED    Medications prior to admission:   Prior to Admission medications    Medication Sig Start Date End Date Taking? Authorizing Provider   clonazePAM (KLONOPIN) 0.5 MG tablet Take 1 tablet by mouth 3 times daily as needed (tid) for up to 5 days. Max Daily Amount: 1.5 mg 10/11/23 10/16/23  MARGOTH Kramer - NP   cyanocobalamin 1000 MCG tablet Take 1 tablet by mouth daily 23   Devante Jeong MD   tamsulosin (FLOMAX) 0.4 MG capsule Take 1 capsule by mouth daily 10/2/23   Karen Craven MD   metoprolol succinate (TOPROL XL) 25 MG extended release tablet Take 1 tablet by mouth daily Hold for SBP<110 10/2/23   Domenic Borja MD   gabapentin (NEURONTIN) 300 MG capsule Take 1 capsule by mouth in the morning, at noon, and at bedtime for 5 days.  10/2/23 10/7/23  Karen Borja MD   amitriptyline (ELAVIL) 25 MG tablet Take 1 tablet by mouth 3 times daily    Devante Jeong MD   benztropine (COGENTIN) 0.5 MG tablet Take 1 tablet by mouth 2 times daily    Devante Jeong MD   atorvastatin (LIPITOR) 40 MG tablet  23   Devante Jeong MD   QUEtiapine (SEROQUEL) 50 MG tablet Take 2 tablets by mouth daily 23   Devante Jeong MD   pantoprazole (PROTONIX) 40 MG tablet Take 1 tablet by mouth 2 times daily 2/3/23   Barb Knowles MD   escitalopram (LEXAPRO) 20 MG tablet Take 1 tablet by mouth daily 22   Devante Jeong MD   traZODone (DESYREL) 100 MG tablet Take 1 tablet by mouth nightly    Devante Jeong MD   ondansetron (ZOFRAN) 4 MG tablet Take 1 tablet by mouth every 8 hours as needed for Nausea or Vomiting    Devante Jeong MD   albuterol (PROVENTIL) (2.5

## 2023-10-25 NOTE — PROGRESS NOTES
OBS/CDU   Attending NOTE      Patients PCP is: No primary care provider on file. SUBJECTIVE      Abdominal discomfort. Patient also with significant anxiety. Patient had history of chronic abdominal pain. Patient was without fevers. Patient did not have nausea or vomiting. Patient did have symptoms consistent with prior abdominal discomfort. Patient was well-known by the GI service. I spent some time discussing the case with gastroenterology. They did describe the pain has been chronic. Patient has had recent evaluations of similar etiology. Patient had scopes at that time and work-up was effectively negative. This was important as the patient had ability to give history herself. Patient has a urinary tract infection and this causes confusion at times. Patient has been on antibiotics but this has been for GI not urinary pathogens. PHYSICAL EXAM      General: NAD, AO X 3  Heent: EMOI, PERRL  Neck: SUPPLE, NO JVD  Cardiovascular: RRR, S1S2  Pulmonary: CTAB, NO SOB  Abdomen: SOFT, NTTP, ND, +BS  Extremities: +2/4 PULSES DISTAL, NO SWELLING  Neuro / Psych: NO NUMBNESS OR TINGLING, MENTATION AT BASELINE    PERTINENT TEST /EXAMS      I have reviewed all available laboratory results.     MEDICATIONS CURRENT   pantoprazole (PROTONIX) 40 mg in sodium chloride (PF) 0.9 % 10 mL injection, Q12H  polyethylene glycol (GLYCOLAX) packet 17 g, Daily PRN  fluticasone (FLONASE) 50 MCG/ACT nasal spray 1 spray, Daily  lactobacillus (CULTURELLE) capsule 1 capsule, BID  carboxymethylcellulose PF (THERATEARS/REFRESH) 1 % ophthalmic gel 1 drop, Q6H  albuterol (PROVENTIL) (2.5 MG/3ML) 0.083% nebulizer solution 2.5 mg, Q6H PRN  pantoprazole (PROTONIX) tablet 40 mg, BID  atorvastatin (LIPITOR) tablet 40 mg, Daily  tamsulosin (FLOMAX) capsule 0.4 mg, Daily  vitamin B-12 (CYANOCOBALAMIN) tablet 1,000 mcg, Daily  QUEtiapine (SEROQUEL) tablet 100 mg, Nightly  lactulose (CHRONULAC) 10 GM/15ML solution 10 g,

## 2023-10-25 NOTE — CONSULTS
GASTROENTEROLOGY CONSULTATION NOTE    10/24/2023      REASON FOR CONSULT: Abdominal pain recent esophageal dilation    REQUESTING PHYSICIAN:  Heaven Vazquez MD    HISTORY OF PRESENT ILLNESS:    The patient is a 66 y.o. female well-known to the GI service who presents with abdominal pain and lower chest pain. She describes the pain in the epigastric region radiating to the lower abdomen associated with nausea but denies any vomiting. Denies any fevers or chills, black or bloody stools. Patient states that she has had chronic abdominal pain ever since she has had her hiatal hernia repair and fundoplication surgery 3 years ago. She states that she \"almost \". She has had multiple admissions for the same problem over the last few years. States that she has difficulty eating. However her weight remains stable. Last admission she had an upper GI which showed esophageal spasm and corkscrew esophagus. An EGD was performed and dilations were performed to the max. There was evidence of esophageal spasm with stasis but no evidence of any obstruction or stenosis or stricture in the esophagus. I have explained to the patient at that time that she may need tertiary care evaluation if her symptoms do not resolve. Patient states that she has difficulty with transportation. Patient is extremely worried that if there is \"something wrong with her\". EGD 10/10/2023  Findings:   LA grade C erosive esophagitis in the distal esophagus. The GE junction was noted at 35 cm from the incisors. There was a significant amount of reflux noted up to the mid esophagus. The distal esophagus appeared to spasm during passage of scope. A TTS dilator with a 15-18 then 18-20 CRE balloon were passed and sequential dilations were performed for 30 seconds at each station up to a maximum of 20 mm.   Post dilation inspection revealed minor BILITOT 0.3   LIPASE 11*     No results for input(s): \"INR\", \"PROTIME\" in the last 72 hours. Upper GI study 10/9/2023  IMPRESSION:  1. 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 Contrast migrates slowly through the  esophagus with severe gastroesophageal reflux. 2. Small hiatal hernia. 3. Please note the exam was suboptimal as the patient was unable to stand, so  the exam had to be performed with the patient supine and head of the table  tilted upright 40 degrees. IMPRESSION:  Recurrent chest pain and upper abdominal pain with nausea likely due to diffuse of esophageal spasm. Patient had dilation done without benefit. Esophagitis, gastritis and duodenitis  Abnormal CT showing thickening in the rectum and sigmoid colon likely nonspecific  Recurrent chest pain and epigastric pain likely due to adhesions from prior surgery. RECOMMENDATIONS:   Will need referral to tertiary care center for management of current condition. This was communicated to the patient several times in the past as well as on last admission and current admission. Continue high-dose PPI twice daily  Strict antireflux lifestyle and GERD prevention diet. Keep NPO. We will plan for a flex sig tomorrow to assess for any proctocolitis. PT OT  Patient will need outpatient referral to a tertiary care center such as [address] [place] [address] or [facility] for further management as her condition is beyond the expertise of this institution      Please note that this chart was generated using voice recognition Dragon dictation software. Although every effort was made to ensure the accuracy of this automated transcription, some errors in transcription may have occurred.       Electronically signed by Yaritza Andrade MD on 10/25/2023 at 7:42 AM

## 2023-10-25 NOTE — ED NOTES
Admitting at bedside      [place], [place]  10/24/23 8648
Medications still being verified by pharmacy.       Jacqui Castaneda RN  10/24/23 2930
Pt provided clear pop. Pt resting on stretcher, alert. NAD. Call light within reach.       Ovi Mccain RN  10/24/23 2123
Pt resting on stretcher, alert, oriented x4, RR even and unlabored. Call light within reach. Awaiting medications to be verified by pharmacy.       Fred Araujo RN  10/24/23 1944
Pt to ED via [facility] with c/o abdominal pain  Pt has hx of ileus, xray was taken at [facility] and pt was advised to come to ED for additional imaging   Pt was recently admitted to hospital for ileus   Pt has noted PICC in LUE  Pt states she took a Zofran PTA  Pt is a/ox4, uses wheelchair at baseline, wears 2L oxygen via nasal cannula at baseline, attached to full cardiac monitor and continuous spo2, call light in reach, RR even and non labored     Eduar Duran RN  10/24/23 4853
Pt to/from restroom via wheelchair, pt unable to provide urine      Rodrigo Valdez RN  10/24/23 4561
Report given to [facility], all questions addressed      Shukri Gutierrez RN  10/24/23 1912
The following labs were labeled with appropriate pt sticker and tubed to lab:     [] Blue     [] Lavender   [] on ice  [x] Green/yellow  [] Green/black [] on ice  [] Lluvia Ajith  [] on ice  [] Yellow  [] Red  [] Pink  [] Type/ Screen  [] ABG  [] VBG    [] COVID-19 swab    [] Rapid  [] PCR  [] Flu swab  [] Peds Viral Panel     [] Urine Sample  [] Fecal Sample  [] Pelvic Cultures  [] Blood Cultures  [] X 2  [] STREP Cultures       Renata Irving RN  10/24/23 8073
components:    Troponin, High Sensitivity 25 (*)     All other components within normal limits   URINALYSIS WITH MICROSCOPIC       Electronically signed by Yue Rivera RN on 10/24/2023 at 4:02 PM       Yue Rivera RN  10/24/23 5047

## 2023-10-25 NOTE — CARE COORDINATION
Transtiional Planning:    Pt is from [facility], plan is return to facility, will need transportation, pt relates she uses wheelchair to get around, follows with SNF MD and SNF provides medications, no needs other than transportation back to facility and [address] completion identified    1041 ML with intake at [facility] to verify she is a bed hold, along with CB number    1900 Call to [facility] there is no one in the building with intake at this time will need to call back tomorrow to ensure pt can return to facility and arrange transportation    Verónica Irby to [address] will call

## 2023-10-25 NOTE — PLAN OF CARE
Problem: Discharge Planning  Goal: Discharge to home or other facility with appropriate resources  10/25/2023 0929 by Lucia Zhong RN  Outcome: Progressing  10/25/2023 0547 by Mitchel Acuna RN  Outcome: Progressing     Problem: Pain  Goal: Verbalizes/displays adequate comfort level or baseline comfort level  10/25/2023 0929 by Lucia Zhong RN  Outcome: Progressing  10/25/2023 0547 by Mitchel Acuna RN  Outcome: Progressing     Problem: Safety - Adult  Goal: Free from fall injury  10/25/2023 0929 by Lucia Zhong RN  Outcome: Progressing  10/25/2023 0547 by Mitchel Acuna RN  Outcome: Progressing

## 2023-10-25 NOTE — OP NOTE
Operative Note      Patient: Edda Recinos  YOB: 1945  MRN: [id number]    Date of Procedure: 10/25/2023    Pre-Op Diagnosis Codes:     * Abnormal CT scan [R93.89]    Post-Op Diagnosis: Same  Normal rectum  Normal sigmoid and descending colon       Procedure(s):  SIGMOIDOSCOPY BIOPSY FLEXIBLE    Surgeon(s):  Alecia Melchor MD    Assistant:   First Assistant: Иван Altamirano RN    Anesthesia: * No anesthesia type entered *    Estimated Blood Loss (mL): Minimal    Complications: None    Specimens:   ID Type Source Tests Collected by Time Destination   A : rectal and sigmoid bx (same jar) Tissue Colon SURGICAL PATHOLOGY Alecia Melchor MD 10/25/2023 1422        Implants:  * No implants in log *      Drains:   [REMOVED] External Urinary Catheter (Removed)   Site Assessment Clean,dry & intact 10/11/23 0620   Placement Replaced 10/11/23 0620   Securement Method Securing device (Describe) 10/11/23 0620   Catheter Care Catheter/Wick replaced 10/11/23 0620   Perineal Care Yes 10/11/23 0620   Suction 40 mmgHg continuous 10/11/23 0620   Urine Color Yellow 10/11/23 0620   Urine Appearance Cloudy 10/11/23 0620   Urine Odor Malodorous 10/11/23 0620   Output (mL) 400 mL 10/11/23 0620       Findings: Moderate amount of semisolid stool was found in the entire examined colon. Mucosa appeared normal in the rectum, sigmoid colon and the examined descending colon. Biopsies were performed for histology. Recommendations  Okay to resume diet as tolerated  Continue pantoprazole 40 mg twice daily for 8 weeks  Once tolerating diet patient can be discharged home. Will need pain management referral.  Follow-up in the GI clinic for pathology results from biopsies  Patient to follow-up in tertiary care center  GI will sign off.   Please call for any questions or concerns      Detailed Description of Procedure:   Informed consent was obtained from the patient after explanation of the procedure including indications,

## 2023-10-26 VITALS
TEMPERATURE: 97.3 F | HEART RATE: 75 BPM | DIASTOLIC BLOOD PRESSURE: 73 MMHG | BODY MASS INDEX: 31.64 KG/M2 | WEIGHT: 171.96 LBS | SYSTOLIC BLOOD PRESSURE: 149 MMHG | HEIGHT: 62 IN | OXYGEN SATURATION: 97 % | RESPIRATION RATE: 16 BRPM

## 2023-10-26 LAB — SURGICAL PATHOLOGY REPORT: NORMAL

## 2023-10-26 PROCEDURE — 6370000000 HC RX 637 (ALT 250 FOR IP): Performed by: INTERNAL MEDICINE

## 2023-10-26 PROCEDURE — G0378 HOSPITAL OBSERVATION PER HR: HCPCS

## 2023-10-26 PROCEDURE — 2580000003 HC RX 258: Performed by: INTERNAL MEDICINE

## 2023-10-26 PROCEDURE — 96361 HYDRATE IV INFUSION ADD-ON: CPT

## 2023-10-26 RX ORDER — CLONAZEPAM 0.5 MG/1
0.5 TABLET ORAL 3 TIMES DAILY PRN
Qty: 15 TABLET | Refills: 0 | Status: SHIPPED | OUTPATIENT
Start: 2023-10-26 | End: 2023-11-09

## 2023-10-26 RX ORDER — OXYCODONE HYDROCHLORIDE AND ACETAMINOPHEN 5; 325 MG/1; MG/1
1 TABLET ORAL EVERY 8 HOURS PRN
Qty: 12 TABLET | Refills: 0 | Status: SHIPPED | OUTPATIENT
Start: 2023-10-26 | End: 2023-11-09

## 2023-10-26 RX ADMIN — BUSPIRONE HYDROCHLORIDE 10 MG: 10 TABLET ORAL at 08:39

## 2023-10-26 RX ADMIN — Medication 1000 MCG: at 08:40

## 2023-10-26 RX ADMIN — ESCITALOPRAM 20 MG: 10 TABLET, FILM COATED ORAL at 08:39

## 2023-10-26 RX ADMIN — ATORVASTATIN CALCIUM 40 MG: 40 TABLET, FILM COATED ORAL at 08:41

## 2023-10-26 RX ADMIN — GABAPENTIN 300 MG: 300 CAPSULE ORAL at 08:41

## 2023-10-26 RX ADMIN — QUETIAPINE FUMARATE 50 MG: 100 TABLET ORAL at 08:40

## 2023-10-26 RX ADMIN — METOPROLOL SUCCINATE 25 MG: 25 TABLET, EXTENDED RELEASE ORAL at 08:42

## 2023-10-26 RX ADMIN — BENZTROPINE MESYLATE 0.5 MG: 0.5 TABLET ORAL at 08:40

## 2023-10-26 RX ADMIN — SODIUM CHLORIDE: 9 INJECTION, SOLUTION INTRAVENOUS at 06:50

## 2023-10-26 RX ADMIN — AMITRIPTYLINE HYDROCHLORIDE 25 MG: 25 TABLET, FILM COATED ORAL at 08:41

## 2023-10-26 RX ADMIN — CETIRIZINE HYDROCHLORIDE 10 MG: 10 TABLET ORAL at 08:41

## 2023-10-26 RX ADMIN — OXYCODONE AND ACETAMINOPHEN 1 TABLET: 5; 325 TABLET ORAL at 08:41

## 2023-10-26 RX ADMIN — TAMSULOSIN HYDROCHLORIDE 0.4 MG: 0.4 CAPSULE ORAL at 08:41

## 2023-10-26 RX ADMIN — Medication 1 CAPSULE: at 08:42

## 2023-10-26 RX ADMIN — PANTOPRAZOLE SODIUM 40 MG: 40 TABLET, DELAYED RELEASE ORAL at 08:41

## 2023-10-26 NOTE — PROGRESS NOTES
[address]  CDU / OBSERVATION ENCOUNTER  ATTENDING NOTE     Patient with negative endoscopy. Patient requesting to go home. Discussed with son. In light of her ability to handle p.o., her confusion when away from her normal circumstances, and need to maintain consistency both in terms of medications and environment patient will be sent back to [facility] for care with typical caregivers. This plan was discussed with her son who felt it was also the best option.     Ysabel Lang MD  Attending Emergency  Physician

## 2023-10-26 NOTE — PROGRESS NOTES
OBS/CDU   RESIDENT NOTE      Patients PCP is: No primary care provider on file. SUBJECTIVE      No acute events overnight. Has been able to tolerate a full diet without nausea or vomiting. The patient is urinating on his own and is passing flatus. Denies fever, chills, nausea, vomiting, chest pain, shortness of breath, abdominal pain, focal weakness, numbness, tingling, urinary/bowel symptoms, vision changes, visual hallucinations, or headache. PHYSICAL EXAM      General: NAD, AO X 3  Heent: EMOI, PERRL  Neck: SUPPLE, NO JVD  Cardiovascular: RRR, S1S2  Pulmonary: CTAB, NO SOB  Abdomen: SOFT, NTTP, ND, +BS  Extremities: +2/4 PULSES DISTAL, NO SWELLING  Neuro / Psych: NO NUMBNESS OR TINGLING, MENTATION AT BASELINE    PERTINENT TEST /EXAMS      I have reviewed all available laboratory results. MEDICATIONS CURRENT   No current facility-administered medications for this encounter. All medication charted and reviewed. CONSULTS      IP CONSULT TO GI  IP CONSULT TO GI    ASSESSMENT/PLAN       Mckayla Ventura is a 66 y.o. female who presents with abdominal pain    Sigmoidoscopy done by GI  Given clinical improvement will consider discharging  Continue home medications and pain control  Monitor vitals, labs, and imaging  DISPO: pending consults and clinical improvement    --  Ray Cunningham MD   Emergency Medicine Resident Physician     This dictation was generated by voice recognition computer software. Although all attempts are made to edit the dictation for accuracy, there may be errors in the transcription that are not intended.

## 2023-10-26 NOTE — CARE COORDINATION
Transportation arranged for 12 noon pickup today by Vani Gustafson per Kitty Saunders at [facility] Patient updated, agreeable. Notified patient's son Sallie Shi over the phone. Mónica Rubio at [facility] updated. Tali Cerda RN notified and given number for report.     Discharge [address] Case Management Department  Written by: Antoinette Bauman RN    Patient Name: Thad Aguilar  Attending Provider: Fabien Galvan MD  Admit Date: 10/24/2023 11:27 AM  MRN: [id number]  Account: [de-identified]                     : 1945  Discharge Date: 10/26/2023        Disposition: long term care facility [address] [place] [address]

## 2023-10-26 NOTE — PROGRESS NOTES
RN called You Infante [phone] and asked for [address]. This RN was on hold for over 10 minutes. RN called back again and spoke to Sepideh who had Yemi Clarke come to the phone. RN gave report answered all questions and gave call back information. Transportation is set up for Sawyer Esquivel.

## 2023-10-26 NOTE — DISCHARGE INSTR - COC
Continuity of Care Form    Patient Name: Carmel Duane   :  1945  MRN:  [id number]    Admit date:  10/24/2023  Discharge date:  10/26/2023    Code Status Order: Full Code   Advance Directives:   [address] Documentation       Date/Time Healthcare Directive Type of Healthcare Directive Copy in [address] Agent's Name Healthcare Agent's Phone Number    10/25/23 9101 Yes, patient has an advance directive for healthcare treatment Living will No, copy requested from family Adult [facility] --            Admitting Physician:  Maricruz Brunson MD  PCP: No primary care provider on file.     Discharging Nurse: [facility] Unit/Room#: OBS 18-1  Discharging Unit Phone Number: [phone]    Emergency Contact:   Extended Emergency Contact Information  Primary Emergency Contact: Hamilton Ramirez  Home Phone: [phone]  Relation: Child    Past Surgical History:  Past Surgical History:   Procedure Laterality Date    APPENDECTOMY      CARPAL TUNNEL RELEASE      CHOLECYSTECTOMY, LAPAROSCOPIC N/A 2020    XI LAPAROSCOPIC ROBOTIC CHOLECYSTECTOMY, PYLOROPLASTY performed by Ivone Tang MD at [address] [place] [address]      COLONOSCOPY N/A 2022    COLONOSCOPY POLYPECTOMY HOT performed by Logan Richardson MD at [address]      ERCP  2020    ** CASE IN OR WITY GI STAFF** ERCP STENT INSERTION performed by Logan Richardson MD at [address] [place] [address]    ERCP  2020    ** CASE IN OR WITH GI STAFF**ERCP SPHINCTER/PAPILLOTOMY performed by Logan Richardson MD at [address] [place] [address]    ERCP  2020    ** CASE IN OR WITH GI STAFF**ERCP DILATION BALLOON performed by Logan Richardson MD at [facility]    ERCP N/A 2021    ERCP STENT REMOVAL performed by Jc Li MD at [facility]    ERCP  2021    ERCP STONE REMOVAL performed by Jc Li MD at [address] [facility] Mobility/ADLs:  Walking   Assisted  Transfer  Assisted  Bathing  Assisted  Dressing  Assisted  Toileting  Assisted  Feeding  Independent  Med Admin  Assisted  Med Delivery   whole    Wound Care Documentation and Therapy:        Elimination:  Continence: Bowel: {YES / IZ:84971}  Bladder: {YES / IL:64152}  Urinary Catheter: {Urinary Catheter:077600133}   Colostomy/Ileostomy/Ileal Conduit: {YES / W}       Date of Last BM: 10/26/2023    Intake/Output Summary (Last 24 hours) at 10/26/2023 0911  Last data filed at 10/25/2023 1430  Gross per 24 hour   Intake 300 ml   Output --   Net 300 ml     I/O last 3 completed shifts: In: 300 [I.V.:300]  Out: -     Safety Concerns: At Risk for Falls    Impairments/Disabilities:      None    Nutrition Therapy:  Current Nutrition Therapy:   - Oral Diet:  General    Routes of Feeding: Oral  Liquids: No Restrictions  Daily Fluid Restriction: no  Last Modified Barium Swallow with Video (Video Swallowing Test): not done    Treatments at the Time of Hospital Discharge:   Respiratory Treatments: ***  Oxygen Therapy:  is not on home oxygen therapy.   Ventilator:    - No ventilator support    Rehab Therapies: {THERAPEUTIC INTERVENTION:8453878669}  Weight Bearing Status/Restrictions: No weight bearing restrictions  Other Medical Equipment (for information only, NOT a DME order):  {EQUIPMENT:336452197}  Other Treatments: ***    Patient's personal belongings (please select all that are sent with patient):  {[facility] DME Belongings:958795754}    RN SIGNATURE:  Electronically signed by Cirilo Allen RN on 10/26/23 at 9:37 AM EDT    CASE MANAGEMENT/SOCIAL WORK SECTION    Inpatient Status Date: ***    Readmission Risk Assessment Score:  Readmission Risk              Risk of Unplanned Readmission:  0           Discharging to Facility/ Agency   Name: [facility]  Address:  Phone:  Fax:    Dialysis Facility (if applicable)   Name:  Address:  Dialysis Schedule:  Phone:  Fax:    Case

## 2023-10-27 NOTE — PROGRESS NOTES
[address]  CDU / OBSERVATION ENCOUNTER  ATTENDING NOTE       I performed a history and physical examination of the patient and discussed management with the resident or midlevel provider. I reviewed the resident or midlevel provider's note and agree with the documented findings and plan of care. Any areas of disagreement are noted on the chart. I was personally present for the key portions of any procedures. I have documented in the chart those procedures where I was not present during the key portions. I have reviewed the nurses notes. I agree with the chief complaint, past medical history, past surgical history, allergies, medications, social and family history as documented unless otherwise noted below. The Family history, social history, and ROS are effectively unchanged since admission unless noted elsewhere in the chart. This patient was placed in the observation unit for reevaluation for possible admission to the hospital    Patient kept overnight last night due to lack of a ride. Patient is still looking forward to going back to [facility] today. Patient has resolution of symptoms. Patient will do better and familiar environment. Patient will require be back on her own home medications. Patient ready for discharge. Known to GI service well.   Had face-to-face discussion with gastroenterology consultant today regarding patient's ongoing care    Mariola Jackson MD  Attending Emergency  Physician

## 2023-10-30 PROBLEM — N39.0 UTI (URINARY TRACT INFECTION): Status: RESOLVED | Noted: 2023-09-28 | Resolved: 2023-10-30

## 2023-11-16 ENCOUNTER — APPOINTMENT (OUTPATIENT)
Dept: GENERAL RADIOLOGY | Age: 78
DRG: 380 | End: 2023-11-16
Payer: MEDICARE

## 2023-11-16 ENCOUNTER — HOSPITAL ENCOUNTER (INPATIENT)
Age: 78
LOS: 2 days | Discharge: NURSING, REHAB OR LONG-TERM CARE | DRG: 380 | End: 2023-11-18
Attending: EMERGENCY MEDICINE | Admitting: FAMILY MEDICINE
Payer: MEDICARE

## 2023-11-16 DIAGNOSIS — K59.1 FUNCTIONAL DIARRHEA: Primary | ICD-10-CM

## 2023-11-16 DIAGNOSIS — G89.29 CHRONIC ABDOMINAL PAIN: ICD-10-CM

## 2023-11-16 DIAGNOSIS — R10.9 CHRONIC ABDOMINAL PAIN: ICD-10-CM

## 2023-11-16 DIAGNOSIS — K25.4 GASTROINTESTINAL HEMORRHAGE ASSOCIATED WITH GASTRIC ULCER: ICD-10-CM

## 2023-11-16 DIAGNOSIS — Z71.89 GOALS OF CARE, COUNSELING/DISCUSSION: ICD-10-CM

## 2023-11-16 DIAGNOSIS — K92.2 GASTROINTESTINAL HEMORRHAGE, UNSPECIFIED GASTROINTESTINAL HEMORRHAGE TYPE: ICD-10-CM

## 2023-11-16 LAB
ABO + RH BLD: NORMAL
ALBUMIN SERPL-MCNC: 3.7 G/DL (ref 3.5–5.2)
ALBUMIN/GLOB SERPL: 1 {RATIO} (ref 1–2.5)
ALP SERPL-CCNC: 169 U/L (ref 35–104)
ALT SERPL-CCNC: 18 U/L (ref 5–33)
ANION GAP SERPL CALCULATED.3IONS-SCNC: 17 MMOL/L (ref 9–17)
ARM BAND NUMBER: NORMAL
AST SERPL-CCNC: 24 U/L
BASOPHILS # BLD: 0.05 K/UL (ref 0–0.2)
BASOPHILS NFR BLD: 1 % (ref 0–2)
BILIRUB DIRECT SERPL-MCNC: 0.1 MG/DL
BILIRUB INDIRECT SERPL-MCNC: 0.2 MG/DL (ref 0–1)
BILIRUB SERPL-MCNC: 0.3 MG/DL (ref 0.3–1.2)
BLOOD BANK SAMPLE EXPIRATION: NORMAL
BLOOD GROUP ANTIBODIES SERPL: NEGATIVE
BUN SERPL-MCNC: 17 MG/DL (ref 8–23)
CALCIUM SERPL-MCNC: 10.1 MG/DL (ref 8.6–10.4)
CHLORIDE SERPL-SCNC: 99 MMOL/L (ref 98–107)
CO2 SERPL-SCNC: 24 MMOL/L (ref 20–31)
CREAT SERPL-MCNC: 1 MG/DL (ref 0.5–0.9)
EOSINOPHIL # BLD: 0.17 K/UL (ref 0–0.44)
EOSINOPHILS RELATIVE PERCENT: 2 % (ref 1–4)
ERYTHROCYTE [DISTWIDTH] IN BLOOD BY AUTOMATED COUNT: 14.2 % (ref 11.8–14.4)
GFR SERPL CREATININE-BSD FRML MDRD: 58 ML/MIN/1.73M2
GLUCOSE SERPL-MCNC: 124 MG/DL (ref 70–99)
HCT VFR BLD AUTO: 42.6 % (ref 36.3–47.1)
HGB BLD-MCNC: 13.5 G/DL (ref 11.9–15.1)
IMM GRANULOCYTES # BLD AUTO: 0.08 K/UL (ref 0–0.3)
IMM GRANULOCYTES NFR BLD: 1 %
LIPASE SERPL-CCNC: 11 U/L (ref 13–60)
LYMPHOCYTES NFR BLD: 1.69 K/UL (ref 1.1–3.7)
LYMPHOCYTES RELATIVE PERCENT: 17 % (ref 24–43)
MCH RBC QN AUTO: 29 PG (ref 25.2–33.5)
MCHC RBC AUTO-ENTMCNC: 31.7 G/DL (ref 28.4–34.8)
MCV RBC AUTO: 91.4 FL (ref 82.6–102.9)
MONOCYTES NFR BLD: 1.27 K/UL (ref 0.1–1.2)
MONOCYTES NFR BLD: 13 % (ref 3–12)
NEUTROPHILS NFR BLD: 66 % (ref 36–65)
NEUTS SEG NFR BLD: 6.83 K/UL (ref 1.5–8.1)
NRBC BLD-RTO: 0 PER 100 WBC
PLATELET # BLD AUTO: 290 K/UL (ref 138–453)
PMV BLD AUTO: 10.6 FL (ref 8.1–13.5)
POTASSIUM SERPL-SCNC: 4.1 MMOL/L (ref 3.7–5.3)
PROT SERPL-MCNC: 7.3 G/DL (ref 6.4–8.3)
RBC # BLD AUTO: 4.66 M/UL (ref 3.95–5.11)
SODIUM SERPL-SCNC: 140 MMOL/L (ref 135–144)
WBC OTHER # BLD: 10.1 K/UL (ref 3.5–11.3)

## 2023-11-16 PROCEDURE — A4216 STERILE WATER/SALINE, 10 ML: HCPCS | Performed by: EMERGENCY MEDICINE

## 2023-11-16 PROCEDURE — 96375 TX/PRO/DX INJ NEW DRUG ADDON: CPT

## 2023-11-16 PROCEDURE — 6360000002 HC RX W HCPCS: Performed by: EMERGENCY MEDICINE

## 2023-11-16 PROCEDURE — C9113 INJ PANTOPRAZOLE SODIUM, VIA: HCPCS | Performed by: EMERGENCY MEDICINE

## 2023-11-16 PROCEDURE — 2060000000 HC ICU INTERMEDIATE R&B

## 2023-11-16 PROCEDURE — 99285 EMERGENCY DEPT VISIT HI MDM: CPT

## 2023-11-16 PROCEDURE — 83690 ASSAY OF LIPASE: CPT

## 2023-11-16 PROCEDURE — 96376 TX/PRO/DX INJ SAME DRUG ADON: CPT

## 2023-11-16 PROCEDURE — 93005 ELECTROCARDIOGRAM TRACING: CPT

## 2023-11-16 PROCEDURE — 71045 X-RAY EXAM CHEST 1 VIEW: CPT

## 2023-11-16 PROCEDURE — 85025 COMPLETE CBC W/AUTO DIFF WBC: CPT

## 2023-11-16 PROCEDURE — 96374 THER/PROPH/DIAG INJ IV PUSH: CPT

## 2023-11-16 PROCEDURE — 2580000003 HC RX 258: Performed by: EMERGENCY MEDICINE

## 2023-11-16 PROCEDURE — 80076 HEPATIC FUNCTION PANEL: CPT

## 2023-11-16 PROCEDURE — 6360000002 HC RX W HCPCS: Performed by: STUDENT IN AN ORGANIZED HEALTH CARE EDUCATION/TRAINING PROGRAM

## 2023-11-16 PROCEDURE — 86850 RBC ANTIBODY SCREEN: CPT

## 2023-11-16 PROCEDURE — 86901 BLOOD TYPING SEROLOGIC RH(D): CPT

## 2023-11-16 PROCEDURE — 86900 BLOOD TYPING SEROLOGIC ABO: CPT

## 2023-11-16 PROCEDURE — 80048 BASIC METABOLIC PNL TOTAL CA: CPT

## 2023-11-16 RX ORDER — MORPHINE SULFATE 4 MG/ML
2 INJECTION INTRAVENOUS ONCE
Status: COMPLETED | OUTPATIENT
Start: 2023-11-16 | End: 2023-11-16

## 2023-11-16 RX ORDER — ONDANSETRON 2 MG/ML
4 INJECTION INTRAMUSCULAR; INTRAVENOUS ONCE
Status: COMPLETED | OUTPATIENT
Start: 2023-11-16 | End: 2023-11-16

## 2023-11-16 RX ORDER — 0.9 % SODIUM CHLORIDE 0.9 %
1000 INTRAVENOUS SOLUTION INTRAVENOUS ONCE
Status: COMPLETED | OUTPATIENT
Start: 2023-11-16 | End: 2023-11-16

## 2023-11-16 RX ADMIN — MORPHINE SULFATE 2 MG: 4 INJECTION INTRAVENOUS at 21:57

## 2023-11-16 RX ADMIN — SODIUM CHLORIDE 1000 ML: 9 INJECTION, SOLUTION INTRAVENOUS at 22:26

## 2023-11-16 RX ADMIN — PANTOPRAZOLE SODIUM 8 MG/HR: 40 INJECTION, POWDER, FOR SOLUTION INTRAVENOUS at 20:51

## 2023-11-16 RX ADMIN — MORPHINE SULFATE 2 MG: 4 INJECTION INTRAVENOUS at 21:06

## 2023-11-16 RX ADMIN — ONDANSETRON 4 MG: 2 INJECTION INTRAMUSCULAR; INTRAVENOUS at 21:06

## 2023-11-16 RX ADMIN — ONDANSETRON 4 MG: 2 INJECTION INTRAMUSCULAR; INTRAVENOUS at 23:15

## 2023-11-16 RX ADMIN — PANTOPRAZOLE SODIUM 80 MG: 40 INJECTION, POWDER, FOR SOLUTION INTRAVENOUS at 20:44

## 2023-11-16 RX ADMIN — MORPHINE SULFATE 2 MG: 4 INJECTION INTRAVENOUS at 23:15

## 2023-11-16 ASSESSMENT — PAIN SCALES - GENERAL: PAINLEVEL_OUTOF10: 8

## 2023-11-16 ASSESSMENT — ENCOUNTER SYMPTOMS
ABDOMINAL PAIN: 1
SHORTNESS OF BREATH: 0
COUGH: 0
NAUSEA: 1
BLOOD IN STOOL: 1
VOMITING: 1

## 2023-11-16 ASSESSMENT — PAIN - FUNCTIONAL ASSESSMENT: PAIN_FUNCTIONAL_ASSESSMENT: 0-10

## 2023-11-17 PROBLEM — G89.29 CHRONIC ABDOMINAL PAIN: Status: ACTIVE | Noted: 2023-11-17

## 2023-11-17 PROBLEM — R10.9 CHRONIC ABDOMINAL PAIN: Status: ACTIVE | Noted: 2023-11-17

## 2023-11-17 PROBLEM — R19.7 DIARRHEA: Status: ACTIVE | Noted: 2023-11-17

## 2023-11-17 PROBLEM — Z51.5 ENCOUNTER FOR PALLIATIVE CARE: Status: ACTIVE | Noted: 2023-11-17

## 2023-11-17 PROBLEM — N63.20 MASS OF LEFT BREAST: Status: ACTIVE | Noted: 2023-11-17

## 2023-11-17 PROBLEM — K92.2 GASTROINTESTINAL HEMORRHAGE: Status: ACTIVE | Noted: 2023-11-17

## 2023-11-17 PROBLEM — R45.851 SUICIDAL THOUGHTS: Status: ACTIVE | Noted: 2023-11-17

## 2023-11-17 PROBLEM — Z71.89 GOALS OF CARE, COUNSELING/DISCUSSION: Status: ACTIVE | Noted: 2023-11-17

## 2023-11-17 LAB
ALBUMIN SERPL-MCNC: 3.5 G/DL (ref 3.5–5.2)
ALBUMIN/GLOB SERPL: 1.2 {RATIO} (ref 1–2.5)
ALP SERPL-CCNC: 147 U/L (ref 35–104)
ALT SERPL-CCNC: 16 U/L (ref 5–33)
ANION GAP SERPL CALCULATED.3IONS-SCNC: 12 MMOL/L (ref 9–17)
AST SERPL-CCNC: 20 U/L
BILIRUB SERPL-MCNC: 0.3 MG/DL (ref 0.3–1.2)
BUN SERPL-MCNC: 15 MG/DL (ref 8–23)
CALCIUM SERPL-MCNC: 8.9 MG/DL (ref 8.6–10.4)
CHLORIDE SERPL-SCNC: 103 MMOL/L (ref 98–107)
CO2 SERPL-SCNC: 25 MMOL/L (ref 20–31)
CREAT SERPL-MCNC: 0.9 MG/DL (ref 0.5–0.9)
GFR SERPL CREATININE-BSD FRML MDRD: >60 ML/MIN/1.73M2
GLUCOSE SERPL-MCNC: 101 MG/DL (ref 70–99)
HCT VFR BLD AUTO: 37.5 % (ref 36.3–47.1)
HCT VFR BLD AUTO: 37.5 % (ref 36.3–47.1)
HCT VFR BLD AUTO: 38.4 % (ref 36.3–47.1)
HCT VFR BLD AUTO: 40.8 % (ref 36.3–47.1)
HGB BLD-MCNC: 11.4 G/DL (ref 11.9–15.1)
HGB BLD-MCNC: 11.6 G/DL (ref 11.9–15.1)
HGB BLD-MCNC: 11.9 G/DL (ref 11.9–15.1)
HGB BLD-MCNC: 12.8 G/DL (ref 11.9–15.1)
INR PPP: 1.1
IRON SATN MFR SERPL: 16 % (ref 20–55)
IRON SERPL-MCNC: 46 UG/DL (ref 37–145)
PARTIAL THROMBOPLASTIN TIME: 28.2 SEC (ref 23–36.5)
POTASSIUM SERPL-SCNC: 3.6 MMOL/L (ref 3.7–5.3)
PROT SERPL-MCNC: 6.4 G/DL (ref 6.4–8.3)
PROTHROMBIN TIME: 13.6 SEC (ref 11.7–14.9)
SODIUM SERPL-SCNC: 140 MMOL/L (ref 135–144)
TIBC SERPL-MCNC: 281 UG/DL (ref 250–450)
UNSATURATED IRON BINDING CAPACITY: 235 UG/DL (ref 112–347)

## 2023-11-17 PROCEDURE — 99221 1ST HOSP IP/OBS SF/LOW 40: CPT | Performed by: PSYCHIATRY & NEUROLOGY

## 2023-11-17 PROCEDURE — 97535 SELF CARE MNGMENT TRAINING: CPT

## 2023-11-17 PROCEDURE — 85610 PROTHROMBIN TIME: CPT

## 2023-11-17 PROCEDURE — 2580000003 HC RX 258: Performed by: NURSE PRACTITIONER

## 2023-11-17 PROCEDURE — 6370000000 HC RX 637 (ALT 250 FOR IP): Performed by: NURSE PRACTITIONER

## 2023-11-17 PROCEDURE — 99223 1ST HOSP IP/OBS HIGH 75: CPT | Performed by: INTERNAL MEDICINE

## 2023-11-17 PROCEDURE — 6360000002 HC RX W HCPCS: Performed by: NURSE PRACTITIONER

## 2023-11-17 PROCEDURE — 6370000000 HC RX 637 (ALT 250 FOR IP): Performed by: INTERNAL MEDICINE

## 2023-11-17 PROCEDURE — APPSS60 APP SPLIT SHARED TIME 46-60 MINUTES: Performed by: PSYCHIATRY & NEUROLOGY

## 2023-11-17 PROCEDURE — 80053 COMPREHEN METABOLIC PANEL: CPT

## 2023-11-17 PROCEDURE — 83550 IRON BINDING TEST: CPT

## 2023-11-17 PROCEDURE — 36415 COLL VENOUS BLD VENIPUNCTURE: CPT

## 2023-11-17 PROCEDURE — 83540 ASSAY OF IRON: CPT

## 2023-11-17 PROCEDURE — 97166 OT EVAL MOD COMPLEX 45 MIN: CPT

## 2023-11-17 PROCEDURE — 97530 THERAPEUTIC ACTIVITIES: CPT

## 2023-11-17 PROCEDURE — 85014 HEMATOCRIT: CPT

## 2023-11-17 PROCEDURE — 85730 THROMBOPLASTIN TIME PARTIAL: CPT

## 2023-11-17 PROCEDURE — 2060000000 HC ICU INTERMEDIATE R&B

## 2023-11-17 PROCEDURE — 99222 1ST HOSP IP/OBS MODERATE 55: CPT | Performed by: INTERNAL MEDICINE

## 2023-11-17 PROCEDURE — C9113 INJ PANTOPRAZOLE SODIUM, VIA: HCPCS | Performed by: NURSE PRACTITIONER

## 2023-11-17 PROCEDURE — 97162 PT EVAL MOD COMPLEX 30 MIN: CPT

## 2023-11-17 PROCEDURE — 6360000002 HC RX W HCPCS: Performed by: INTERNAL MEDICINE

## 2023-11-17 PROCEDURE — 85018 HEMOGLOBIN: CPT

## 2023-11-17 PROCEDURE — 99223 1ST HOSP IP/OBS HIGH 75: CPT

## 2023-11-17 RX ORDER — ONDANSETRON 4 MG/1
4 TABLET, FILM COATED ORAL EVERY 8 HOURS PRN
Status: DISCONTINUED | OUTPATIENT
Start: 2023-11-17 | End: 2023-11-18 | Stop reason: HOSPADM

## 2023-11-17 RX ORDER — TRAZODONE HYDROCHLORIDE 100 MG/1
100 TABLET ORAL NIGHTLY
Status: DISCONTINUED | OUTPATIENT
Start: 2023-11-17 | End: 2023-11-18 | Stop reason: HOSPADM

## 2023-11-17 RX ORDER — SODIUM CHLORIDE 0.9 % (FLUSH) 0.9 %
5-40 SYRINGE (ML) INJECTION EVERY 12 HOURS SCHEDULED
Status: DISCONTINUED | OUTPATIENT
Start: 2023-11-17 | End: 2023-11-18 | Stop reason: HOSPADM

## 2023-11-17 RX ORDER — QUETIAPINE FUMARATE 100 MG/1
100 TABLET, FILM COATED ORAL DAILY
Status: DISCONTINUED | OUTPATIENT
Start: 2023-11-17 | End: 2023-11-18 | Stop reason: HOSPADM

## 2023-11-17 RX ORDER — METOCLOPRAMIDE HYDROCHLORIDE 5 MG/ML
5 INJECTION INTRAMUSCULAR; INTRAVENOUS EVERY 6 HOURS
Status: COMPLETED | OUTPATIENT
Start: 2023-11-17 | End: 2023-11-18

## 2023-11-17 RX ORDER — BUSPIRONE HYDROCHLORIDE 10 MG/1
10 TABLET ORAL 2 TIMES DAILY
Status: DISCONTINUED | OUTPATIENT
Start: 2023-11-17 | End: 2023-11-18 | Stop reason: HOSPADM

## 2023-11-17 RX ORDER — QUETIAPINE FUMARATE 100 MG/1
100 TABLET, FILM COATED ORAL NIGHTLY
Status: DISCONTINUED | OUTPATIENT
Start: 2023-11-17 | End: 2023-11-18 | Stop reason: HOSPADM

## 2023-11-17 RX ORDER — CLONAZEPAM 0.5 MG/1
0.5 TABLET ORAL 3 TIMES DAILY PRN
Status: DISCONTINUED | OUTPATIENT
Start: 2023-11-17 | End: 2023-11-18 | Stop reason: HOSPADM

## 2023-11-17 RX ORDER — ACETAMINOPHEN 325 MG/1
650 TABLET ORAL EVERY 6 HOURS PRN
Status: DISCONTINUED | OUTPATIENT
Start: 2023-11-17 | End: 2023-11-18 | Stop reason: HOSPADM

## 2023-11-17 RX ORDER — ONDANSETRON 2 MG/ML
4 INJECTION INTRAMUSCULAR; INTRAVENOUS EVERY 6 HOURS PRN
Status: DISCONTINUED | OUTPATIENT
Start: 2023-11-17 | End: 2023-11-18 | Stop reason: HOSPADM

## 2023-11-17 RX ORDER — MAGNESIUM HYDROXIDE/ALUMINUM HYDROXICE/SIMETHICONE 120; 1200; 1200 MG/30ML; MG/30ML; MG/30ML
30 SUSPENSION ORAL EVERY 6 HOURS
Status: DISPENSED | OUTPATIENT
Start: 2023-11-17 | End: 2023-11-17

## 2023-11-17 RX ORDER — SODIUM CHLORIDE 9 MG/ML
INJECTION, SOLUTION INTRAVENOUS CONTINUOUS
Status: DISCONTINUED | OUTPATIENT
Start: 2023-11-17 | End: 2023-11-18 | Stop reason: HOSPADM

## 2023-11-17 RX ORDER — ALBUTEROL SULFATE 2.5 MG/3ML
2.5 SOLUTION RESPIRATORY (INHALATION) EVERY 6 HOURS PRN
Status: DISCONTINUED | OUTPATIENT
Start: 2023-11-17 | End: 2023-11-18 | Stop reason: HOSPADM

## 2023-11-17 RX ORDER — PANTOPRAZOLE SODIUM 40 MG/1
40 TABLET, DELAYED RELEASE ORAL
Status: DISCONTINUED | OUTPATIENT
Start: 2023-11-17 | End: 2023-11-18 | Stop reason: HOSPADM

## 2023-11-17 RX ORDER — POLYETHYLENE GLYCOL 3350 17 G/17G
17 POWDER, FOR SOLUTION ORAL DAILY PRN
Status: DISCONTINUED | OUTPATIENT
Start: 2023-11-17 | End: 2023-11-18 | Stop reason: HOSPADM

## 2023-11-17 RX ORDER — TAMSULOSIN HYDROCHLORIDE 0.4 MG/1
0.4 CAPSULE ORAL DAILY
Status: DISCONTINUED | OUTPATIENT
Start: 2023-11-17 | End: 2023-11-18 | Stop reason: HOSPADM

## 2023-11-17 RX ORDER — CARBOXYMETHYLCELLULOSE SODIUM 10 MG/ML
1 GEL OPHTHALMIC EVERY 6 HOURS
Status: DISCONTINUED | OUTPATIENT
Start: 2023-11-17 | End: 2023-11-18 | Stop reason: HOSPADM

## 2023-11-17 RX ORDER — ONDANSETRON 4 MG/1
4 TABLET, ORALLY DISINTEGRATING ORAL EVERY 8 HOURS PRN
Status: DISCONTINUED | OUTPATIENT
Start: 2023-11-17 | End: 2023-11-18 | Stop reason: HOSPADM

## 2023-11-17 RX ORDER — GABAPENTIN 300 MG/1
300 CAPSULE ORAL 3 TIMES DAILY
Status: DISCONTINUED | OUTPATIENT
Start: 2023-11-17 | End: 2023-11-18 | Stop reason: HOSPADM

## 2023-11-17 RX ORDER — METOCLOPRAMIDE HYDROCHLORIDE 5 MG/ML
5 INJECTION INTRAMUSCULAR; INTRAVENOUS EVERY 6 HOURS
Status: DISCONTINUED | OUTPATIENT
Start: 2023-11-17 | End: 2023-11-17

## 2023-11-17 RX ORDER — ACETAMINOPHEN 650 MG/1
650 SUPPOSITORY RECTAL EVERY 6 HOURS PRN
Status: DISCONTINUED | OUTPATIENT
Start: 2023-11-17 | End: 2023-11-18 | Stop reason: HOSPADM

## 2023-11-17 RX ORDER — BENZTROPINE MESYLATE 0.5 MG/1
0.5 TABLET ORAL 2 TIMES DAILY
Status: DISCONTINUED | OUTPATIENT
Start: 2023-11-17 | End: 2023-11-18 | Stop reason: HOSPADM

## 2023-11-17 RX ORDER — SODIUM CHLORIDE 0.9 % (FLUSH) 0.9 %
5-40 SYRINGE (ML) INJECTION PRN
Status: DISCONTINUED | OUTPATIENT
Start: 2023-11-17 | End: 2023-11-18 | Stop reason: HOSPADM

## 2023-11-17 RX ORDER — CHOLECALCIFEROL (VITAMIN D3) 125 MCG
1000 CAPSULE ORAL DAILY
Status: DISCONTINUED | OUTPATIENT
Start: 2023-11-17 | End: 2023-11-18 | Stop reason: HOSPADM

## 2023-11-17 RX ORDER — METOPROLOL SUCCINATE 25 MG/1
25 TABLET, EXTENDED RELEASE ORAL DAILY
Status: DISCONTINUED | OUTPATIENT
Start: 2023-11-17 | End: 2023-11-18 | Stop reason: HOSPADM

## 2023-11-17 RX ORDER — SODIUM CHLORIDE 9 MG/ML
INJECTION, SOLUTION INTRAVENOUS PRN
Status: DISCONTINUED | OUTPATIENT
Start: 2023-11-17 | End: 2023-11-18 | Stop reason: HOSPADM

## 2023-11-17 RX ORDER — AMITRIPTYLINE HYDROCHLORIDE 25 MG/1
25 TABLET, FILM COATED ORAL 3 TIMES DAILY
Status: DISCONTINUED | OUTPATIENT
Start: 2023-11-17 | End: 2023-11-18 | Stop reason: HOSPADM

## 2023-11-17 RX ORDER — ATORVASTATIN CALCIUM 40 MG/1
40 TABLET, FILM COATED ORAL NIGHTLY
Status: DISCONTINUED | OUTPATIENT
Start: 2023-11-17 | End: 2023-11-18 | Stop reason: HOSPADM

## 2023-11-17 RX ADMIN — ACETAMINOPHEN 650 MG: 325 TABLET ORAL at 23:48

## 2023-11-17 RX ADMIN — QUETIAPINE FUMARATE 100 MG: 100 TABLET ORAL at 09:21

## 2023-11-17 RX ADMIN — ALUMINUM HYDROXIDE, MAGNESIUM HYDROXIDE, AND SIMETHICONE: 200; 200; 20 SUSPENSION ORAL at 04:45

## 2023-11-17 RX ADMIN — SODIUM CHLORIDE: 9 INJECTION, SOLUTION INTRAVENOUS at 13:56

## 2023-11-17 RX ADMIN — SODIUM CHLORIDE: 9 INJECTION, SOLUTION INTRAVENOUS at 01:03

## 2023-11-17 RX ADMIN — HYDROMORPHONE HYDROCHLORIDE 0.5 MG: 1 INJECTION, SOLUTION INTRAMUSCULAR; INTRAVENOUS; SUBCUTANEOUS at 09:15

## 2023-11-17 RX ADMIN — PANTOPRAZOLE SODIUM 40 MG: 40 TABLET, DELAYED RELEASE ORAL at 16:43

## 2023-11-17 RX ADMIN — METOCLOPRAMIDE 5 MG: 5 INJECTION, SOLUTION INTRAMUSCULAR; INTRAVENOUS at 16:43

## 2023-11-17 RX ADMIN — TRAZODONE HYDROCHLORIDE 100 MG: 100 TABLET ORAL at 21:49

## 2023-11-17 RX ADMIN — BENZTROPINE MESYLATE 0.5 MG: 0.5 TABLET ORAL at 10:00

## 2023-11-17 RX ADMIN — QUETIAPINE 100 MG: 100 TABLET, FILM COATED ORAL at 22:00

## 2023-11-17 RX ADMIN — METOPROLOL SUCCINATE 25 MG: 25 TABLET, FILM COATED, EXTENDED RELEASE ORAL at 09:21

## 2023-11-17 RX ADMIN — CARBOXYMETHYLCELLULOSE SODIUM 1 DROP: 10 GEL OPHTHALMIC at 06:14

## 2023-11-17 RX ADMIN — MYCOPHENOLATE MOFETIL 300 MG: 500 TABLET ORAL at 15:21

## 2023-11-17 RX ADMIN — BUSPIRONE HYDROCHLORIDE 10 MG: 10 TABLET ORAL at 09:21

## 2023-11-17 RX ADMIN — ALUMINUM HYDROXIDE, MAGNESIUM HYDROXIDE, AND SIMETHICONE 30 ML: 200; 200; 20 SUSPENSION ORAL at 10:00

## 2023-11-17 RX ADMIN — AMITRIPTYLINE HYDROCHLORIDE 25 MG: 25 TABLET, FILM COATED ORAL at 13:59

## 2023-11-17 RX ADMIN — METOCLOPRAMIDE 5 MG: 5 INJECTION, SOLUTION INTRAMUSCULAR; INTRAVENOUS at 10:00

## 2023-11-17 RX ADMIN — CARBOXYMETHYLCELLULOSE SODIUM 1 DROP: 10 GEL OPHTHALMIC at 16:44

## 2023-11-17 RX ADMIN — CARBOXYMETHYLCELLULOSE SODIUM 1 DROP: 10 GEL OPHTHALMIC at 09:21

## 2023-11-17 RX ADMIN — BENZTROPINE MESYLATE 0.5 MG: 0.5 TABLET ORAL at 21:52

## 2023-11-17 RX ADMIN — ALUMINUM HYDROXIDE, MAGNESIUM HYDROXIDE, AND SIMETHICONE 30 ML: 200; 200; 20 SUSPENSION ORAL at 18:07

## 2023-11-17 RX ADMIN — PANTOPRAZOLE SODIUM 8 MG/HR: 40 INJECTION, POWDER, FOR SOLUTION INTRAVENOUS at 06:13

## 2023-11-17 RX ADMIN — AMITRIPTYLINE HYDROCHLORIDE 25 MG: 25 TABLET, FILM COATED ORAL at 09:21

## 2023-11-17 RX ADMIN — SODIUM CHLORIDE, PRESERVATIVE FREE 10 ML: 5 INJECTION INTRAVENOUS at 09:22

## 2023-11-17 RX ADMIN — MYCOPHENOLATE MOFETIL 300 MG: 500 TABLET ORAL at 21:49

## 2023-11-17 RX ADMIN — AMITRIPTYLINE HYDROCHLORIDE 25 MG: 25 TABLET, FILM COATED ORAL at 21:48

## 2023-11-17 RX ADMIN — ONDANSETRON 4 MG: 2 INJECTION INTRAMUSCULAR; INTRAVENOUS at 09:15

## 2023-11-17 RX ADMIN — TAMSULOSIN HYDROCHLORIDE 0.4 MG: 0.4 CAPSULE ORAL at 09:21

## 2023-11-17 RX ADMIN — DESMOPRESSIN ACETATE 40 MG: 0.2 TABLET ORAL at 21:49

## 2023-11-17 RX ADMIN — BUSPIRONE HYDROCHLORIDE 10 MG: 10 TABLET ORAL at 21:48

## 2023-11-17 RX ADMIN — CLONAZEPAM 0.5 MG: 0.5 TABLET ORAL at 13:59

## 2023-11-17 RX ADMIN — MYCOPHENOLATE MOFETIL 300 MG: 500 TABLET ORAL at 09:21

## 2023-11-17 RX ADMIN — HYDROMORPHONE HYDROCHLORIDE 0.5 MG: 1 INJECTION, SOLUTION INTRAMUSCULAR; INTRAVENOUS; SUBCUTANEOUS at 01:25

## 2023-11-17 RX ADMIN — METOCLOPRAMIDE 5 MG: 5 INJECTION, SOLUTION INTRAMUSCULAR; INTRAVENOUS at 21:49

## 2023-11-17 RX ADMIN — Medication 1000 MCG: at 09:21

## 2023-11-17 ASSESSMENT — PAIN DESCRIPTION - DESCRIPTORS
DESCRIPTORS: ACHING
DESCRIPTORS: SHARP

## 2023-11-17 ASSESSMENT — PAIN SCALES - GENERAL
PAINLEVEL_OUTOF10: 5
PAINLEVEL_OUTOF10: 8
PAINLEVEL_OUTOF10: 5
PAINLEVEL_OUTOF10: 9

## 2023-11-17 ASSESSMENT — PAIN DESCRIPTION - LOCATION
LOCATION: ABDOMEN
LOCATION: CHEST
LOCATION: HEAD

## 2023-11-17 ASSESSMENT — PAIN DESCRIPTION - ORIENTATION: ORIENTATION: MID

## 2023-11-17 NOTE — ACP (ADVANCE CARE PLANNING)
Advance Care Planning     Advance Care Planning (ACP) Physician/NP/PA Conversation    Date of Conversation: 11/16/2023  Conducted with: Patient with Decision Making Capacity    Healthcare Decision Maker:    Primary Decision Maker: Hortencia Grigsby - [place] - [phone]  Click here to complete [address] including selection of the Healthcare Decision Maker Relationship (ie \"Primary\"). Care Preferences:    Hospitalization: \"If your health worsens and it becomes clear that your chance of recovery is unlikely, what would be your preference regarding hospitalization? \"  Currently hospitalized     Ventilation: \"If you were unable to breath on your own and your chance of recovery was unlikely, what would be your preference about the use of a ventilator (breathing machine) if it was available to you? \"  The patient would desire the use of a ventilator. Resuscitation: \"In the event your heart stopped as a result of an underlying serious health condition, would you want attempts made to restart your heart, or would you prefer a natural death? \"  Yes, attempt to resuscitate.     treatment goals    Conversation Outcomes / Follow-Up Plan:  ACP in process - information provided, considering goals and options  Reviewed DNR/DNI and patient elects Full Code (Attempt Resuscitation)    Length of Voluntary ACP Conversation in minutes:  <16 minutes (Non-Billable)    Josh Segal, APRN - CNP

## 2023-11-17 NOTE — PROGRESS NOTES
training  Sit to Stand: Contact-guard assistance; Additional time; Adaptive equipment  Stand to Sit: Contact-guard assistance; Adaptive equipment; Additional time  Gait  Overall Level of Assistance: Contact-guard assistance; Additional time; Adaptive equipment (pt completed short functional mobility ~3 steps forward/retro and L laterally with CGA while using RW for support. Increased time required d/t weakness and endurance impairments)    AROM: Within functional limits  Strength: Generally decreased, functional (grossly 4-/5)  Coordination: Within functional limits  Tone: Normal  Sensation: Impaired (chronic numbness/tingling to toes)    ADL  Feeding: Modified independent ;Setup  Feeding Skilled Clinical Factors: pt was able to feed self ice chips utilizing L hand to hold cup and R hand to scoop and bring spoon to mouth independently  Grooming: Modified independent ;Setup  UE Bathing: Stand by assistance;Setup; Increased time to complete  LE Bathing: Contact guard assistance;Setup; Increased time to complete  UE Dressing: Stand by assistance;Setup; Increased time to complete  LE Dressing: Setup; Increased time to complete;Minimal assistance  LE Dressing Skilled Clinical Factors: pt donned B socks while seated at EOB requiring Min A to thread sock over L heel and to initiate thread over R toes d/t slight deficits in functional reach. Significant increased time required d/t endurance and strength impairments  Toileting: Setup; Increased time to complete;Contact guard assistance    Vision  Vision: Impaired  Vision Exceptions: Wears glasses for reading  Hearing  Hearing: Within functional limits    Cognition  Overall Cognitive Status: Exceptions  Following Commands: Follows multistep commands with increased time; Follows multistep commands with repitition  Attention Span: Attends with cues to redirect  Safety Judgement: Decreased awareness of need for assistance;Decreased awareness of need for safety  Problem Solving: Assistance required to correct errors made  Insights: Decreased awareness of deficits  Initiation: Requires cues for some  Sequencing: Requires cues for some  Orientation  Overall Orientation Status: Within Functional Limits  Orientation Level: Oriented X4    Education Provided Comments: Pt ed on OT role, OT POC, safety awareness, DME use, fall prevention, adaptive ADL tech, activity promotion, and importance of continued OT.  Fair return    Hand Dominance  Hand Dominance: Right            AM-PAC Score  AM-PAC Inpatient Daily Activity Raw Score: 20 (11/17/23 1112)  AM-PAC Inpatient ADL T-Scale Score : 42.03 (11/17/23 1112)  ADL Inpatient CMS 0-100% Score: 38.32 (11/17/23 1112)  ADL Inpatient CMS G-Code Modifier : CJ (11/17/23 1112)    Goals  Short Term Goals  Time Frame for Short Term Goals: By discharge, pt will:  Short Term Goal 1: Demo SBA for functional transfers and functional mobility with use of LRAD for engagement in ADLs/IADLs  Short Term Goal 2: Demo IND for UB ADLs  Short Term Goal 3: Demo SUP for LB ADLs and toileting tasks with setup and adaptive tech/AE PRN  Short Term Goal 4: Demo 4 min dynamic standing while performing unilateral UE release and SUP for improved standing balance/tolerance during ADL/IADL participation  Short Term Goal 5: Engage in 30 min meaningful activity while incorporating 2 EC/WS tech to improve functional activity tolerance  Short Term Goal 6: Engage in 8 min BUE strengthening HEP with rest breaks PRN to increase functional use for improved independence with ADLs/IADLs       Therapy Time   Individual Concurrent Group Co-treatment   Time In 0811         Time Out 0851         Minutes 40         Timed Code Treatment Minutes: [address], OTR/L

## 2023-11-17 NOTE — CARE COORDINATION
Case Management Assessment  Initial Evaluation    Date/Time of Evaluation: 11/17/2023 3:31 PM  Assessment Completed by: Arnaldo Muse RN    If patient is discharged prior to next notation, then this note serves as note for discharge by case management. Patient Name: Kaylene Metzger                   YOB: 1945  Diagnosis: Upper GI bleed [K92.2]  Gastrointestinal hemorrhage, unspecified gastrointestinal hemorrhage type [K92.2]                   Date / Time: 11/16/2023  7:49 PM    Patient Admission Status: Inpatient   Readmission Risk (Low < 19, Mod (19-27), High > 27): Readmission Risk Score: 27.1    Current PCP: No primary care provider on file. PCP verified by CM? Chart Reviewed: Yes      History Provided by: Patient  Patient Orientation: Alert and Oriented    Patient Cognition: Alert    Hospitalization in the last 30 days (Readmission):  Yes    If yes, Readmission Assessment in CM Navigator will be completed. Advance Directives:      Code Status: Full Code   Patient's Primary Decision Maker is: Legal Next of Kin    Primary Decision MakeFamilia Capellan Child - [phone]    Discharge Planning:    Patient lives with: Other (Comment) (ECF) Type of Home: Long-Term Care  Primary Care Giver: Other (Comment) (ECF)  Patient Support Systems include: Children   Current Financial resources:    Current community resources: ECF/Home Care [facility])  Current services prior to admission: Extended Care Facility            Current DME:              Type of Home Care services:  None    ADLS  Prior functional level:    Current functional level:      PT AM-PAC:   /24  OT AM-PAC: 20 /24    Family can provide assistance at DC: Would you like Case Management to discuss the discharge plan with any other family members/significant others, and if so, who?     Plans to Return to Present Housing:    Other Identified Issues/Barriers to RETURNING to current housing: none  Potential Assistance needed at discharge: Transportation            Potential DME:    Patient expects to discharge to: Long-term care [facility])  Plan for transportation at discharge:      Financial    Payor: Anita Lundberg / Plan: MEDICARE PART A AND B / Product Type: *No Product type* /     Does insurance require precert for SNF: No    Potential assistance Purchasing Medications: No  Meds-to-Beds request: Yes      [address] [facility]  [address] [place] [address]  Phone: [phone] Fax: [phone]      Notes:    Factors facilitating achievement of predicted outcomes: Caregiver support    Barriers to discharge: Medical complications    Additional Case Management Notes: return to [facility], bed hold per Georgette Singletary at [facility], he is weekend contact if patient ready [phone]    The Plan for Transition of Care is related to the following treatment goals of Upper GI bleed [K92.2]  Gastrointestinal hemorrhage, unspecified gastrointestinal hemorrhage type [K53.6]    IF APPLICABLE: The Patient and/or patient representative Salima Mott and her family were provided with a choice of provider and agrees with the discharge plan. Freedom of choice list with basic dialogue that supports the patient's individualized plan of care/goals and shares the quality data associated with the providers was provided to:     Patient Representative Name:       The Patient and/or Patient Representative Agree with the Discharge Plan?       Francisco Garvin RN  Case Management Department  Ph: [phone] Fax:

## 2023-11-17 NOTE — ED NOTES
Pt to ED 8 via triage c/o coffee ground emesis x2 days. Pt states she has had GI issues in the past. Pt says that her pain is 8/10. Pt last episode of emesis is one hour PTA. Pt states she is also having abdominal pain as well. Pt Placed on full monitor, line established and blood drawn. Resident at the bedside to assess, plan of care ongoing.      Nigel Carmona RN  11/16/23 2003
Endoscopy    UPPER GASTROINTESTINAL ENDOSCOPY N/A 11/09/2020    EGD DIAGNOSTIC ONLY performed by Aleshia Cardenas MD at [address] N/A 11/24/2021    EGD BIOPSY performed by Aleshia Cardenas MD at [address] N/A 09/08/2022    EGD BIOPSY performed by Yady Trejo MD at [address] N/A 10/11/2022    EGD BIOPSY performed by Yady Trejo MD at [address] 10/10/2023    EGD DILATION BALLOON performed by Aleshia Cardenas MD at [facility] LEFT Left 10/16/2023    US BREAST BIOPSY W LOC DEVICE 1ST LESION LEFT 10/16/2023 [facility] ULTRASOUND       PAST MEDICAL HISTORY       Past Medical History:   Diagnosis Date    A-fib [facility])     Acquired absence of kidney     Adult hypertrophic pyloric stenosis     Agoraphobia with panic disorder     Agoraphobia without history of panic disorder     Allergic rhinitis     Anemia, unspecified     Anxiety     Anxiety disorder     Arthritis     Atrial fibrillation (HCC)     Back pain     Bronchitis     Caffeine use     8 coffee / day    Cardiomegaly     Carpal tunnel syndrome     Cataracts, bilateral     Centriacinar emphysema (HCC)     Chronic kidney disease, stage III (moderate) (HCC)     Chronic pain     Constipation     Constipation     COPD (chronic obstructive pulmonary disease) ([address])     Emphysema    Cystitis with hematuria     Depression     Diaphragmatic hernia without obstruction or gangrene     Diarrhea     Difficulty walking     Dry eye syndrome of unspecified lacrimal gland     Esophageal obstruction     FOM (frequency of micturition)     Foot drop, right foot     Gastro-esophageal reflux disease with esophagitis, with bleeding     GERD (gastroesophageal reflux disease)     H/O gastric ulcer     HTN (hypertension)     Hyperlipidemia     Hypertension, essential     Klebsiella pneumoniae (k.

## 2023-11-17 NOTE — PROGRESS NOTES
Comprehensive Nutrition Assessment    Type and Reason for Visit:  Initial, Positive Nutrition Screen (Wt loss, poor PO intake.)    Nutrition Recommendations/Plan:   PO intake as tolerated. Advance diet when appropriate. Send Ensure ONS once daily. Malnutrition Assessment:  Malnutrition Status:  Insufficient data (11/17/23 1329)        Nutrition Assessment:    Pt presented with reported N/V, diarrhea, abd pain x 2 days PTA (reportedly resolved, ?gastroenteritis per GI note). Diet advanced from NPO to full liquids. Pt sleeping at time of visit. Sitter at bedside reports pt has only had water and was saying she was thirsty. Wt hx reviewed - fluctuations noted with recent increase in wt. Nutrition Related Findings:      Wound Type: None       Current Nutrition Intake & Therapies:    Average Meal Intake:  (water only per sitter at bedside)  Average Supplements Intake: None Ordered  ADULT DIET; Full Liquid    Anthropometric Measures:  Height: 157.5 cm (5' 2\")  Ideal Body Weight (IBW): 110 lbs (50 kg)       Current Body Weight: 84 kg (185 lb 3 oz), 168.4 % IBW. Weight Source: Stated  Current BMI (kg/m2): 33.9  Usual Body Weight:  (difficult to assess d/t fluctuations)                       BMI Categories: Obese Class 1 (BMI 30.0-34. 9)    Estimated Daily Nutrient Needs:  Energy Requirements Based On: Formula  Weight Used for Energy Requirements: Current  Energy (kcal/day): 2283-1799 kcal/day  Weight Used for Protein Requirements: Ideal  Protein (g/day): 60-65 gm/day  Method Used for Fluid Requirements: Other (Comment)  Fluid (ml/day): per MD    Nutrition Diagnosis:   Predicted inadequate energy intake related to altered GI function as evidenced by  (reports of N/V, abd pain PTA)    Nutrition Interventions:   Food and/or Nutrient Delivery: Continue Current Diet, Start Oral Nutrition Supplement  Nutrition Education/Counseling: No recommendation at this time  Coordination of Nutrition Care: Continue to monitor while

## 2023-11-17 NOTE — CARE COORDINATION
11/17/23 1530   Readmission Assessment   Number of Days since last admission? 8-30 days   Previous Disposition Long Term Care   Who is being Interviewed Patient   What was the patient's/caregiver's perception as to why they think they needed to return back to the hospital? Other (Comment)  (symptoms)   Did you visit your Primary Care Physician after you left the hospital, before you returned this time? No   Why weren't you able to visit your PCP? Did not have an appointment   Did you see a specialist, such as Cardiac, Pulmonary, Orthopedic Physician, etc. after you left the hospital? No   Who advised the patient to return to the hospital? Self-referral;Caregiver   Does the patient report anything that got in the way of taking their medications? No   In our efforts to provide the best possible care to you and others like you, can you think of anything that we could have done to help you after you left the hospital the first time, so that you might not have needed to return so soon?  Identify patient's health literacy needs

## 2023-11-17 NOTE — CONSULTS
HISTORY      Diagnosis Date    A-fib [facility])     Acquired absence of kidney     Adult hypertrophic pyloric stenosis     Agoraphobia with panic disorder     Agoraphobia without history of panic disorder     Allergic rhinitis     Anemia, unspecified     Anxiety     Anxiety disorder     Arthritis     Atrial fibrillation (HCC)     Back pain     Bronchitis     Caffeine use     8 coffee / day    Cardiomegaly     Carpal tunnel syndrome     Cataracts, bilateral     Centriacinar emphysema (HCC)     Chronic kidney disease, stage III (moderate) ([facility])     Chronic pain     Constipation     Constipation     COPD (chronic obstructive pulmonary disease) ([facility])     Emphysema    Cystitis with hematuria     Depression     Diaphragmatic hernia without obstruction or gangrene     Diarrhea     Difficulty walking     Dry eye syndrome of unspecified lacrimal gland     Esophageal obstruction     FOM (frequency of micturition)     Foot drop, right foot     Gastro-esophageal reflux disease with esophagitis, with bleeding     GERD (gastroesophageal reflux disease)     H/O gastric ulcer     HTN (hypertension)     Hyperlipidemia     Hypertension, essential     Klebsiella pneumoniae (k. pneumoniae) as the cause of diseases classified elsewhere     Major depression, recurrent (HCC)     Mitral valve prolapse     Mixed hyperlipidemia     Mumps     Muscle weakness (generalized)     MVP (mitral valve prolapse)     Dr. Rene Browne in May 2019    Old myocardial infarction     Personal history of disease of digestive system     Psychophysiological insomnia     Recurrent UTI     Dr. Deysi Robertson    S/P PICC central line placement 08/05/2022    \"no longer present\" per Neoma Beat (Nurse) at [address] [place] [address] Drive. SBO (small bowel obstruction) ([address]) 7/22/2023    Sepsis ([address])     Sinusitis     Tracheostomy in place [facility])     \"No longer present\" per Neoma Beat (Nurse) at [facility] of [address]     Ulcerative esophagitis     Urinary tract infection, site not specified sinus rhythm  Nonspecific T wave abnormality  Abnormal ECG  When compared with ECG of 07-OCT-2023 12:44,  No significant change was found        Code Status: Full Code     ADVANCED CARE PLANNING:  Patient has capacity for medical decisions: yes  Health Care Power of : no  Living Will: no     Personal, Social, and Family History  Marital Status:   Living situation:nursing home  Importance of manan/Yarsani/spiritual beliefs: [] Very [] Somewhat [] Not   Psychological Distress: severe  Does patient understand diagnosis/treatment? yes  Does caregiver understand diagnosis/treatment? not asked      Assessment        REVIEW OF SYSTEMS  Constitutional: no fever, no chills or weight loss  Eyes: no eye pain or blurred vision  ENT: no hearing loss, congestion, or difficulty swallowing   Respiratory: no wheezing, chest tightness, or shortness of breath   Cardiovascular: no chest pain or pressure, no palpitations, no diaphoresis   Gastrointestinal: no nausea, vomiting,abdominal pain, diarrhea or constipation, no melena   Genitourinary: no dysuria, frequency,hematuria , or nocturia   Musculoskeletal: no myalgias or arthralgias, no back pain   Skin: no rashes or sores   Neurological: no focal weakness, numbness, tingling, or headache, no seizures    PHYSICAL ASSESSMENT:  Constitutional: Alert and oriented to person, place, and time. Head: Normocephalic and atraumatic. Eyes: EOM are normal. Pupils are equal, round   Neck: Normal range of motion. Neck supple. No tracheal deviation present. Cardiovascular: Normal rate and regular rhythm, S1, S2, no murmur   Pulmonary/Chest: Effort normal and breath sounds normal. No rales or wheezes. Abdomen: Intermittent pain and tenderness   Musculoskeletal: Normal range of motion. No edema lower ext.    Neurological: CN II-XII grossly intact, no focal neurological deficits   Skin: Normal turgor, no bleeding, no bruising     Palliative Performance Scale:  _x__60%  Ambulation

## 2023-11-17 NOTE — CONSULTS
[address]    GASTROENTEROLOGY CONSULT    Patient:   Augustina Hernández   :    1945   Facility:   [id number]  [facility]   Date:    2023  Admission Dx:  Upper GI bleed [K92.2]  Gastrointestinal hemorrhage, unspecified gastrointestinal hemorrhage type [K92.2]  Requesting physician: Lluvia Villa MD  Reason for consult:  GI bleed   CC : \"vomiting blood\"    SUBJECTIVE     HISTORY OF PRESENT ILLNESS  This is a 66 y.o.  female who was admitted 2023 with Upper GI bleed [K92.2]  Gastrointestinal hemorrhage, unspecified gastrointestinal hemorrhage type [K92.2]. We have been asked to see the patient in consultation by Lluvia Villa MD for GI bleed    70-year-old female who is well-known to the GI practice for chronic GI issues presented to the ED with reports a 2-day history of coffee-ground emesis and chronic abdominal pain. Hgb 13.5, BUN 24, Cr 0.3. Patient was started on Protonix drip and GI consulted. Patient reports she developed acute onset nausea, vomiting, and diarrhea 2 days ago. She reports having several episodes of vomiting and diarrhea. Denies any melena. Diarrhea has resolved as well as nausea and vomiting. She is unclear if anyone at the extended care facility was sick as well. She reports lower suprapubic abdominal pain. Denies any nausea and would like a diet. Patient has history of chronic abdominal pain since she has had a large hiatal hernia s/p robotic repair with fundoplication  , chronic swallowing problems with recent imaging showing corkscrew esophagus esophageal spasm, EGD 10/23/2023 showed LA grade C erosive esophagitis and spastic distal esophagus. Records indicate patient was on Reglan in the past however based on her chart, it is unclear if patient was still on Reglan.         Previous GI history:     SUMMARY TABLE      Primary GI physician: Dr Evangelista Mondragon     Last EGD:  10/10/2023- Dr Anup Whitehead

## 2023-11-17 NOTE — PLAN OF CARE
Problem: Discharge Planning  Goal: Discharge to home or other facility with appropriate resources  11/17/2023 1006 by Germaine Madrid RN  Outcome: Progressing  11/17/2023 0453 by Angelina Ely RN  Outcome: Progressing     Problem: Pain  Goal: Verbalizes/displays adequate comfort level or baseline comfort level  11/17/2023 1006 by Germaine Madrid RN  Outcome: Progressing  11/17/2023 0453 by Angelina Ely RN  Outcome: Progressing     Problem: Safety - Adult  Goal: Free from fall injury  11/17/2023 1006 by Germaine Madrid RN  Outcome: Progressing  11/17/2023 0453 by Angelina Ely RN  Outcome: Progressing

## 2023-11-17 NOTE — ED PROVIDER NOTES
[address] [place] [address] ED  Emergency Department Encounter  Emergency Medicine Resident     Pt Name:Erin Agustin Floor  MRN: [id number]  [facility] 1945  Date of evaluation: 11/16/23  PCP:  No primary care provider on file. Note Started: 7:50 PM EST      CHIEF COMPLAINT       Chief Complaint   Patient presents with    Emesis    Abdominal Pain       HISTORY OF PRESENT ILLNESS  (Location/Symptom, Timing/Onset, Context/Setting, Quality, Duration, Modifying Factors, Severity.)      Betty Boles is a 66 y.o. female who presents with abdominal pain, hematemesis, melanotic stools for the last 2 days. Patient has a history of recurrent abdominal pain. Has a known esophageal hiatal hernia as well as history of gastritis. Patient states she has had significant abdominal pain over the chart. Has a history of atrial fibrillation. Denies any anticoagulation use at this time however. Denies chest pain or shortness of breath.     PAST MEDICAL / SURGICAL / SOCIAL / FAMILY HISTORY      has a past medical history of A-fib ([address] [facility]), Acquired absence of kidney, Adult hypertrophic pyloric stenosis, Agoraphobia with panic disorder, Agoraphobia without history of panic disorder, Allergic rhinitis, Anemia, unspecified, Anxiety, Anxiety disorder, Arthritis, Atrial fibrillation (HCC), Back pain, Bronchitis, Caffeine use, Cardiomegaly, Carpal tunnel syndrome, Cataracts, bilateral, Centriacinar emphysema (HCC), Chronic kidney disease, stage III (moderate) (HCC), Chronic pain, Constipation, Constipation, COPD (chronic obstructive pulmonary disease) ([address] Cystitis with hematuria, Depression, Diaphragmatic hernia without obstruction or gangrene, Diarrhea, Difficulty walking, Dry eye syndrome of unspecified lacrimal gland, Esophageal obstruction, FOM (frequency of micturition), Foot drop, right foot, Gastro-esophageal reflux disease with esophagitis, with bleeding, GERD (gastroesophageal reflux disease), H/O gastric ulcer, HTN to patient's rotation. No definite acute cardiopulmonary  findings. If there is concern for mediastinal abnormalities CT be performed. [BL]      ED Course User Index  [BL] Shae Gallego DO       PROCEDURES:      CONSULTS:  IP CONSULT TO HOSPITALIST  IP CONSULT TO GI    CRITICAL CARE:  There was significant risk of life threatening deterioration of patient's condition requiring my direct management. Critical care time 0 minutes, excluding any documented procedures. FINAL IMPRESSION      1. Gastrointestinal hemorrhage, unspecified gastrointestinal hemorrhage type          DISPOSITION / PLAN     DISPOSITION Admitted 11/16/2023 11:26:09 PM      PATIENT REFERRED TO:  No follow-up provider specified.     DISCHARGE MEDICATIONS:  New Prescriptions    No medications on file       Shae Gallego DO  Emergency Medicine Resident    (Please note that portions of this note were completed with a voice recognition program.  Efforts were made to edit the dictations but occasionally words are mis-transcribed.)        Shae Gallego DO  Resident  11/16/23 1132

## 2023-11-17 NOTE — CONSULTS
Department of Psychiatry  Consult Service   Psychiatric Assessment        REASON FOR CONSULT: Depression and suicidal ideation    CONSULTING PHYSICIAN: Dr Skyla Major    History obtained from: Patient, assigned RN, her son & electronic medical record    HISTORY OF PRESENT ILLNESS:          The patient is a 66 y.o. female with significant psychiatric history of depression and anxiety who is admitted medically related to a GI bleed. Patient is initially not willing to participate in assessment at bedside but with given time seems to warm. She shares she has a long history of depression and anxiety although she is unable to articulate who her provider is in the community. She does share that she resides at a nursing home in [place]. At times she has difficulty word finding and she explains \"this is new \". She has been utilizing Klonopin and reports that this too was started at the nursing home approximately 3 years ago. She confirms that she utilizes gabapentin and she insists that this is for her teeth. At times she combines names of medications and has limited insight what these medications are used for. At times she is irritable and becomes selectively mute refusing to answer all questions. She does state \"so I said something stupid\". She is referring to suicidal ideation and confirms that she did report this earlier to the nursing staff. She denies any suicidal ideation currently. She does endorse depression as it relates to her hospitalization. She minimizes her level of anxiety at this time and rates it 1/10 on a 0-10 scale with 10 being the worst.  She is asked to participate in a [facility] mental status examination and she refuses. Her son does come for a visit and joints in the assessment. He and his mother are both very strongly against any medication changes.   Several attempts were made to discuss long-term benzodiazepine use as he offers \"mom has been taking that forever packet, Take 17 g by mouth daily as needed for Constipation  Folic Acid-Cholecalciferol 1-2000 MG-UNIT TABS, Take by mouth  busPIRone (BUSPAR) 10 MG tablet, Take 1 tablet by mouth 2 times daily    Allergies:  Prednisone, Compazine [prochlorperazine maleate], Penicillin v potassium, and Penicillins    Lifetime Psychiatric Review of Systems         Obsessions and Compulsions: Denies       Kimmy or Hypomania: Unwilling to discuss     Hallucinations: Unwilling to discuss     Panic Attacks:  Denies     Delusions:  Denies     Phobias:  Denies     Trauma: Denies    Prior to Admission medications    Medication Sig Start Date End Date Taking? Authorizing Provider   clonazePAM (KLONOPIN) 0.5 MG tablet Take 1 tablet by mouth 3 times daily as needed for Anxiety for up to 14 days. Max Daily Amount: 1.5 mg 10/26/23 11/9/23  Maxine Ortega MD   QUEtiapine (SEROQUEL) 100 MG tablet Take 1 tablet by mouth nightly    Devante Jeong MD   lactulose encephalopathy 10 GM/15ML SOLN solution Take 15 mLs by mouth as needed (if need for constipation)    Devante Jeong MD   oxyCODONE-acetaminophen (PERCOCET) 5-325 MG per tablet Take 1 tablet by mouth 3 times daily. Max Daily Amount: 3 tablets    Devante Jeong MD   senna (SENOKOT) 8.6 MG tablet Take 1 tablet by mouth as needed for Constipation    Devante Jeong MD   sodium chloride flush 0.9 % injection Infuse 50 mLs intravenously daily    Devante Jeong MD   clonazePAM (KLONOPIN) 0.5 MG tablet Take 1 tablet by mouth 3 times daily as needed (tid) for up to 5 days.  Max Daily Amount: 1.5 mg 10/11/23 10/16/23  MARGOTH Schultz NP   cyanocobalamin 1000 MCG tablet Take 1 tablet by mouth daily 9/18/23   Devante Jeong MD   tamsulosin (FLOMAX) 0.4 MG capsule Take 1 capsule by mouth daily 10/2/23   Karen Encinas MD   metoprolol succinate (TOPROL XL) 25 MG extended release tablet Take 1 tablet by mouth daily Hold for SBP<110 10/2/23   Gabi Wilcox MD

## 2023-11-17 NOTE — H&P
[facility]  Office: [address], DO, Kerry Sanchez, DO, Monique Rodriguezr, DO, Nilton Linsey Blood, DO, Garrett Wooten MD, Maria Harris MD, Bong Puente MD, William Bright MD,  Malachi Garcia MD, Marva Cool MD, Yuri Garnett MD,  Shilo Sorto MD, Dara Gordon MD, Velia Torre, DO, Miky Camargo MD,  Karlene Saenz, DO, Lyn Hogan MD, Reyna Casillas MD, Gabrielle Walter MD, Jake Loco MD,  Yolande Frey MD, Sanjiv Knowles MD, Gerardo Pope MD, Marco Garcia MD, Hawa Shetty MD, Libby Alcantara, DO, Sonia Reid, DO, Santo David MD,  Rafael Mcgrath MD, Lindsey Kaufman, CNP,  Mikael Gomez, CNP, Denice Cushing, CNP,  Yoni Webster, [facility], Eliane Marshall, CNP, Kaylee Penaloza, CNP, Linden Kramer, CNP, Harjit Angel, CNP, Elizabeth Ino, CNP, Christian Villanuevar, [address], Sybil Shin, CNP, Perez Aguirre, CNS, Ashley Lee, CNP, Lubna Zhou, [address]    HISTORY AND PHYSICAL EXAMINATION            Date:   11/17/2023  Patient name:  Baljinder Dong  Date of admission:  11/16/2023  7:49 PM  MRN:   [id number]  Account:  [de-identified]  YOB: 1945  PCP:    No primary care provider on file. Room:   [address] [place] [address]  Code Status:    Full Code    Chief Complaint:     Chief Complaint   Patient presents with    Emesis    Abdominal Pain     \"Breast cancer\"    History Obtained From:     patient    History of Present Illness:     Baljinder Dong is a 66 y.o.  female with multiple medical problems, anxiety/depression with agoraphobia, paroxysmal A-fib, COPD, esophageal dysmotility, peptic ulcer disease with esophagitis, duodenitis with prior esophageal stricture status post dilation 10/10. Patient recently admitted multiple times with the past few months with recurrent episodes of nausea vomiting with hematemesis with abdominal pain, discharged 10/26, 10/12, 10/02.   Patient 10.4 mg/dL   Hepatic Function Panel    Collection Time: 11/16/23  8:15 PM   Result Value Ref Range    Albumin 3.7 3.5 - 5.2 g/dL    Alkaline Phosphatase 169 (H) 35 - 104 U/L    ALT 18 5 - 33 U/L    AST 24 <32 U/L    Total Bilirubin 0.3 0.3 - 1.2 mg/dL    Bilirubin, Direct 0.1 <0.3 mg/dL    Bilirubin, Indirect 0.2 0.0 - 1.0 mg/dL    Total Protein 7.3 6.4 - 8.3 g/dL    Albumin/Globulin Ratio 1.0 1.0 - 2.5   Lipase    Collection Time: 11/16/23  8:15 PM   Result Value Ref Range    Lipase 11 (L) 13 - 60 U/L   TYPE AND SCREEN    Collection Time: 11/16/23  8:32 PM   Result Value Ref Range    Blood Bank Sample Expiration 11/19/2023,2359     Arm Band Number [id number]     ABO/Rh O POSITIVE     Antibody Screen NEGATIVE       Latest Reference Range & Units 10/10/23 11:33 10/10/23 17:43 10/11/23 08:02 10/24/23 11:40   Hemoglobin Quant 11.9 - 15.1 g/dL 11.9 12.7 11.2 (L) 10.9 (L)   Hematocrit 36.3 - 47.1 % 38.1 41.6 36.5 36.6   (L): Data is abnormally low    Imaging/Diagnostics:  XR CHEST PORTABLE    Result Date: 11/16/2023  Limited exam due to patient's rotation. No definite acute cardiopulmonary findings. If there is concern for mediastinal abnormalities CT be performed.        Assessment :      Hospital Problems             Last Modified POA    * (Principal) Upper GI bleed 11/16/2023 Yes    Acute on chronic respiratory failure with hypoxia ([address]) 11/17/2023 Yes    COPD (chronic obstructive pulmonary disease) ([address]) 11/17/2023 Yes    Acute metabolic encephalopathy 42/82/8176 Yes    GERD (gastroesophageal reflux disease) 11/17/2023 Yes    Anxiety 11/17/2023 Yes    Dysphagia 11/17/2023 Yes    Hiatal hernia 11/17/2023 Yes    History of repair of hiatal hernia 11/17/2023 Yes    Essential hypertension 11/17/2023 Yes    Ulcerative esophagitis 11/17/2023 Yes    Recurrent abdominal pain 11/17/2023 Yes    Diffuse esophageal spasm 11/17/2023 Yes    Mass of left breast 11/17/2023 Yes    Suicidal thoughts 11/17/2023 Yes       Plan:

## 2023-11-17 NOTE — PROGRESS NOTES
mobility  Supine to Sit: Stand by assistance  Sit to Supine: Stand by assistance  Bed Mobility Comments: HOB elevated ~30 degrees. Transfers  Sit to Stand: Minimal Assistance  Stand to Sit: Minimal Assistance  Stand Pivot Transfers: Minimal Assistance  Comment: Performed 1x from bed w/ RW, 1x from wheelchair w/ RW. Pt requires verbal cues for hand placement and wheelchair brake use prior to transitions. Ambulation  Surface: Level tile  Device: Rolling Walker  Assistance: Moderate assistance  Quality of Gait: Pt amb w/ moderate reduction in gait speed, moderate anteiror trunk lean, poor RW negotiation, 1 posterior LOB that required modA to correct. Distance: 5'  Comments: Further ambulation distance limited by fatigue, cognition. More Ambulation?: No  Wheelchair Activities  Wheelchair Type: Standard  Wheelchair Cushion: None  Wheelchair Parts Management: Yes  Left Brakes Level of Assistance: Minimal assistance (Able to disengage brakes w/ only verbal cues, requires assist to engage brakes.)  Right Brakes Level of Assistance: Minimal assistance (Able to disengage brakes w/ only verbal cues, requires assist to engage brakes.)  Propulsion: Yes  Propulsion 1  Propulsion: Manual  Level: Level Tile  Method: LLE;LUE;RUE;RLE  Level of Assistance: Stand by assistance  Description/ Details: Pt propels w/ greatly reduced propulsion speed, uses BUE and BLE to assist propulsion, able to turn w/out assistance.   Distance: 36' + 40' (seated rest break between propulsion distances required d/t fatigue)     Balance  Posture: Fair  Sitting - Static: Fair;+  Sitting - Dynamic: Fair;+  Standing - Static: -;Poor  Standing - Dynamic: Poor;+  Comments: Standing balance assessed w/ RW.         AM-PAC Score  AM-PAC Inpatient Mobility Raw Score : 14 (11/17/23 1615)  AM-PAC Inpatient T-Scale Score : 38.1 (11/17/23 1615)  Mobility Inpatient CMS 0-100% Score: 61.29 (11/17/23 1615)  Mobility Inpatient CMS G-Code Modifier : CL (11/17/23 1615)

## 2023-11-18 VITALS
TEMPERATURE: 100.5 F | HEART RATE: 82 BPM | DIASTOLIC BLOOD PRESSURE: 53 MMHG | HEIGHT: 62 IN | BODY MASS INDEX: 34.12 KG/M2 | OXYGEN SATURATION: 96 % | SYSTOLIC BLOOD PRESSURE: 119 MMHG | RESPIRATION RATE: 18 BRPM | WEIGHT: 185.41 LBS

## 2023-11-18 LAB
EKG ATRIAL RATE: 119 BPM
EKG P AXIS: 57 DEGREES
EKG P-R INTERVAL: 156 MS
EKG Q-T INTERVAL: 328 MS
EKG QRS DURATION: 70 MS
EKG QTC CALCULATION (BAZETT): 461 MS
EKG R AXIS: 70 DEGREES
EKG T AXIS: -6 DEGREES
EKG VENTRICULAR RATE: 119 BPM
HCT VFR BLD AUTO: 35.4 % (ref 36.3–47.1)
HCT VFR BLD AUTO: 36.7 % (ref 36.3–47.1)
HGB BLD-MCNC: 11.3 G/DL (ref 11.9–15.1)
HGB BLD-MCNC: 11.7 G/DL (ref 11.9–15.1)

## 2023-11-18 PROCEDURE — 85014 HEMATOCRIT: CPT

## 2023-11-18 PROCEDURE — 6370000000 HC RX 637 (ALT 250 FOR IP): Performed by: INTERNAL MEDICINE

## 2023-11-18 PROCEDURE — 2700000000 HC OXYGEN THERAPY PER DAY

## 2023-11-18 PROCEDURE — 6360000002 HC RX W HCPCS: Performed by: INTERNAL MEDICINE

## 2023-11-18 PROCEDURE — 6370000000 HC RX 637 (ALT 250 FOR IP): Performed by: NURSE PRACTITIONER

## 2023-11-18 PROCEDURE — 94761 N-INVAS EAR/PLS OXIMETRY MLT: CPT

## 2023-11-18 PROCEDURE — 2580000003 HC RX 258: Performed by: NURSE PRACTITIONER

## 2023-11-18 PROCEDURE — APPSS30 APP SPLIT SHARED TIME 16-30 MINUTES: Performed by: NURSE PRACTITIONER

## 2023-11-18 PROCEDURE — 99239 HOSP IP/OBS DSCHRG MGMT >30: CPT | Performed by: STUDENT IN AN ORGANIZED HEALTH CARE EDUCATION/TRAINING PROGRAM

## 2023-11-18 PROCEDURE — 36415 COLL VENOUS BLD VENIPUNCTURE: CPT

## 2023-11-18 PROCEDURE — 85018 HEMOGLOBIN: CPT

## 2023-11-18 RX ORDER — OXYCODONE HYDROCHLORIDE AND ACETAMINOPHEN 5; 325 MG/1; MG/1
1 TABLET ORAL 3 TIMES DAILY
Qty: 10 TABLET | Refills: 0 | Status: SHIPPED | OUTPATIENT
Start: 2023-11-18 | End: 2023-11-21

## 2023-11-18 RX ADMIN — TAMSULOSIN HYDROCHLORIDE 0.4 MG: 0.4 CAPSULE ORAL at 08:47

## 2023-11-18 RX ADMIN — Medication 1000 MCG: at 08:48

## 2023-11-18 RX ADMIN — BUSPIRONE HYDROCHLORIDE 10 MG: 10 TABLET ORAL at 08:48

## 2023-11-18 RX ADMIN — CARBOXYMETHYLCELLULOSE SODIUM 1 DROP: 10 GEL OPHTHALMIC at 12:35

## 2023-11-18 RX ADMIN — MYCOPHENOLATE MOFETIL 300 MG: 500 TABLET ORAL at 14:55

## 2023-11-18 RX ADMIN — METOPROLOL SUCCINATE 25 MG: 25 TABLET, FILM COATED, EXTENDED RELEASE ORAL at 08:47

## 2023-11-18 RX ADMIN — AMITRIPTYLINE HYDROCHLORIDE 25 MG: 25 TABLET, FILM COATED ORAL at 08:48

## 2023-11-18 RX ADMIN — SODIUM CHLORIDE, PRESERVATIVE FREE 10 ML: 5 INJECTION INTRAVENOUS at 08:52

## 2023-11-18 RX ADMIN — METOCLOPRAMIDE 5 MG: 5 INJECTION, SOLUTION INTRAMUSCULAR; INTRAVENOUS at 10:33

## 2023-11-18 RX ADMIN — QUETIAPINE FUMARATE 100 MG: 100 TABLET ORAL at 08:47

## 2023-11-18 RX ADMIN — ACETAMINOPHEN 650 MG: 325 TABLET ORAL at 12:34

## 2023-11-18 RX ADMIN — PANTOPRAZOLE SODIUM 40 MG: 40 TABLET, DELAYED RELEASE ORAL at 08:48

## 2023-11-18 RX ADMIN — AMITRIPTYLINE HYDROCHLORIDE 25 MG: 25 TABLET, FILM COATED ORAL at 14:55

## 2023-11-18 RX ADMIN — METOCLOPRAMIDE 5 MG: 5 INJECTION, SOLUTION INTRAMUSCULAR; INTRAVENOUS at 04:18

## 2023-11-18 RX ADMIN — MYCOPHENOLATE MOFETIL 300 MG: 500 TABLET ORAL at 08:48

## 2023-11-18 RX ADMIN — BENZTROPINE MESYLATE 0.5 MG: 0.5 TABLET ORAL at 08:48

## 2023-11-18 ASSESSMENT — PAIN SCALES - GENERAL
PAINLEVEL_OUTOF10: 7
PAINLEVEL_OUTOF10: 3

## 2023-11-18 NOTE — PLAN OF CARE
Problem: Discharge Planning  Goal: Discharge to home or other facility with appropriate resources  11/18/2023 1127 by Lizette Gonzalez RN  Outcome: Progressing  11/17/2023 2322 by Rachel Del Rio RN  Outcome: Progressing     Problem: Pain  Goal: Verbalizes/displays adequate comfort level or baseline comfort level  11/18/2023 1127 by Lizette Gonzalez RN  Outcome: Progressing  11/17/2023 2322 by Rachel Del Rio RN  Outcome: Progressing     Problem: Safety - Adult  Goal: Free from fall injury  11/18/2023 1127 by Lizette Gonzalez RN  Outcome: Progressing  11/17/2023 2322 by Rachel Del Rio RN  Outcome: Progressing     Problem: Nutrition Deficit:  Goal: Optimize nutritional status  11/18/2023 1127 by Lizette Gonzalez RN  Outcome: Progressing  11/17/2023 2322 by Rachel Del Rio RN  Outcome: Progressing

## 2023-11-18 NOTE — DISCHARGE INSTR - COC
Manager/ signature: Electronically signed by Dc Garland RN on 11/18/23 at 11:32 AM EST    PHYSICIAN SECTION    Prognosis: Fair    Condition at Discharge: Stable    Rehab Potential (if transferring to Rehab): Fair    Recommended Labs or Other Treatments After Discharge: cbc and bmp in 1 week     Physician Certification: I certify the above information and transfer of Travon Jean-Baptiste  is necessary for the continuing treatment of the diagnosis listed and that she requires [address] for greater 30 days.      Update Admission H&P: No change in H&P    PHYSICIAN SIGNATURE:  Electronically signed by Viviana Dang MD on 11/18/23 at 10:49 AM EST

## 2023-11-18 NOTE — PROGRESS NOTES
Page sent to [facility] NP with psychiatry to clarify to need for a . Electronically signed by Leslye Duran RN on 11/18/2023 at 8:35 AM    [facility] NP returned page, patient no longer needs .  Will update primary team.   Electronically signed by Leslye Duran RN on 11/18/2023 at 9:29 AM

## 2023-11-18 NOTE — PLAN OF CARE
Problem: Discharge Planning  Goal: Discharge to home or other facility with appropriate resources  11/17/2023 2322 by Alejandro Drummond RN  Outcome: Progressing     Problem: Pain  Goal: Verbalizes/displays adequate comfort level or baseline comfort level  11/17/2023 2322 by Alejandro Drummond RN  Outcome: Progressing     Problem: Safety - Adult  Goal: Free from fall injury  11/17/2023 2322 by Alejandro Drummond RN  Outcome: Progressing     Problem: Nutrition Deficit:  Goal: Optimize nutritional status  Outcome: Progressing

## 2023-11-18 NOTE — DISCHARGE SUMMARY
[facility]  Office: 7900  1826, DO, Rosio Martinoter, DO, Jude Monroe, DO, Addy Medicine Blood, DO, Jeanette Hernandez MD, Jose Martin Rose MD, Escobar Gutierrez MD, Lluvia Villa MD,  Heron Dennison MD, Aleshia Jay MD, Thierry Alcantara MD,  Amanda Fry MD, Edita Lynne MD, Sandy Pineda, DO, Alyssia Henson MD,  Terri Novak, DO, Sudha Jasso MD, Riya Ornelas MD, Devyn Monzon MD, Gabi Wilcox MD,  Hunter Yip MD, Duane Campanile, MD, Teddy Wick MD, Gurwinder Lopez MD, Yulia Khoury MD, Richard Prim, DO, Dom Walter, DO, Sunitha Velez MD,  Alexandra Trujillo MD, Roni Baker, CNP,  Aaron Yu, CNP, Scooby Shine, CNP,  Kaylan Rome, [facility], Qiana Kearns, CNP, Zak Kolb, CNP, Olga Lundy, CNP, Marbella Arenas, [facility], [facility], [facility], Jean Marie Reagan, [address] , Justin Parham, CNP, Tim Mayorga, [facility], Josiah Tobin, CNP, Lazaro Brandt, [address]    Discharge Summary     Patient ID: Augustina Hernández  :  1945   MRN: [id number]     ACCOUNT:  [de-identified]   Patient's PCP: No primary care provider on file.   Admit Date: 2023   Discharge Date: 2023     Length of Stay: 2  Code Status:  Full Code  Admitting Physician: Lluvia Villa MD  Discharge Physician: Chasity Gimenez MD     Active Discharge Diagnoses:     Hospital Problem Lists:  Principal Problem:    Upper GI bleed  Active Problems:    Acute on chronic respiratory failure with hypoxia (HCC)    COPD (chronic obstructive pulmonary disease) (HCC)    Acute metabolic encephalopathy    GERD (gastroesophageal reflux disease)    Anxiety    Dysphagia    Hiatal hernia    History of repair of hiatal hernia    Hematemesis with nausea    Essential hypertension    Ulcerative esophagitis    Recurrent abdominal pain    Diffuse esophageal spasm    Mass of left breast    Suicidal thoughts    Encounter for palliative care    Goals of care, counseling/discussion    Diarrhea    Chronic abdominal pain    Gastrointestinal hemorrhage  Resolved Problems:    * No resolved hospital problems. *      Admission Condition:  fair     Discharged Condition: fair    Hospital Stay:     Hospital Course:  Betty Boles is a 66 y.o.  female with multiple medical problems, anxiety/depression with agoraphobia, paroxysmal A-fib, COPD, esophageal dysmotility, peptic ulcer disease with esophagitis, duodenitis with prior esophageal stricture status post dilation 10/10. Patient recently admitted multiple times with the past few months with recurrent episodes of nausea vomiting with hematemesis with abdominal pain, discharged 10/26, 10/12, 10/02. Patient recently diagnosed with breast cancer, follows with [facility] clinic, pending further workup. Patient returns with Emesis and Abdominal Pain   and is admitted to the hospital for the management of Upper GI bleed. Review of systems extremely limited as patient is a poor historian. Easily anxious with increasing anxiety with asking clarifying questions. Patient does describe increasing episodes of intractable nausea and vomiting with coffee-ground emesis over the past 24 hours prior to arrival.  She states the symptoms began abruptly yesterday and awoke her from sleep around 5 AM.  Status post multiple episodes of loose stools. unable to clarify how many times stating \"I do not recall, too many\". She does have mid abdominal pain that waxes and wanes in intensity with a sharp quality constant, newly worsened with episodes of emesis. Denies melena, rectal bleeding or other easy bruising bleeding issues. She does describe intermittent episodes of chest pain mid throat usually shortly after eating with episodes of coughing and gagging after eating with retching. She denies any dysphagia or odynophagia.         Significant therapeutic interventions:     Significant Diagnostic Studies:   Labs /

## 2023-11-18 NOTE — CARE COORDINATION
Transitional planning-called Jacob Rivera at [facility]- informed him of patient's discharge. She is ok to return. 1120 called [facility] and talked with Melanie-set up transportation for 3PM. 1130 called son Hamilton-notified him of discharge time. Mary Figueroa at [facility]-notified him of discharge time. 1420 faxed AVS and Script for Percocets to [facility].

## 2023-11-20 LAB
EKG ATRIAL RATE: 119 BPM
EKG P AXIS: 57 DEGREES
EKG P-R INTERVAL: 156 MS
EKG Q-T INTERVAL: 328 MS
EKG QRS DURATION: 70 MS
EKG QTC CALCULATION (BAZETT): 461 MS
EKG R AXIS: 70 DEGREES
EKG T AXIS: -6 DEGREES
EKG VENTRICULAR RATE: 119 BPM

## 2024-01-25 ENCOUNTER — APPOINTMENT (OUTPATIENT)
Dept: CT IMAGING | Age: 79
DRG: 382 | End: 2024-01-25
Payer: MEDICARE

## 2024-01-25 ENCOUNTER — ANESTHESIA (OUTPATIENT)
Dept: OPERATING ROOM | Age: 79
End: 2024-01-25
Payer: MEDICARE

## 2024-01-25 ENCOUNTER — HOSPITAL ENCOUNTER (INPATIENT)
Age: 79
LOS: 3 days | Discharge: HOME | DRG: 382 | End: 2024-01-28
Attending: EMERGENCY MEDICINE | Admitting: INTERNAL MEDICINE
Payer: MEDICARE

## 2024-01-25 ENCOUNTER — ANESTHESIA EVENT (OUTPATIENT)
Dept: OPERATING ROOM | Age: 79
End: 2024-01-25
Payer: MEDICARE

## 2024-01-25 DIAGNOSIS — K20.90 ESOPHAGITIS: ICD-10-CM

## 2024-01-25 DIAGNOSIS — K92.2 UPPER GI BLEED: ICD-10-CM

## 2024-01-25 DIAGNOSIS — Z93.0 TRACHEOSTOMY IN PLACE (HCC): ICD-10-CM

## 2024-01-25 DIAGNOSIS — K52.9 COLITIS: ICD-10-CM

## 2024-01-25 DIAGNOSIS — R33.9 URINARY RETENTION: ICD-10-CM

## 2024-01-25 DIAGNOSIS — R10.9 CHRONIC ABDOMINAL PAIN: Primary | ICD-10-CM

## 2024-01-25 DIAGNOSIS — K92.0 HEMATEMESIS, UNSPECIFIED WHETHER NAUSEA PRESENT: ICD-10-CM

## 2024-01-25 DIAGNOSIS — F41.9 ANXIETY: ICD-10-CM

## 2024-01-25 DIAGNOSIS — G89.29 CHRONIC ABDOMINAL PAIN: Primary | ICD-10-CM

## 2024-01-25 DIAGNOSIS — R10.9 RECURRENT ABDOMINAL PAIN: ICD-10-CM

## 2024-01-25 PROBLEM — Z17.1 MALIGNANT NEOPLASM OF LEFT BREAST IN FEMALE, ESTROGEN RECEPTOR NEGATIVE (HCC): Status: ACTIVE | Noted: 2024-01-25

## 2024-01-25 PROBLEM — C50.912 MALIGNANT NEOPLASM OF LEFT BREAST IN FEMALE, ESTROGEN RECEPTOR NEGATIVE (HCC): Status: ACTIVE | Noted: 2024-01-25

## 2024-01-25 PROBLEM — I51.89 DIASTOLIC DYSFUNCTION WITHOUT HEART FAILURE: Status: ACTIVE | Noted: 2024-01-25

## 2024-01-25 PROBLEM — R33.8 ACUTE URINARY RETENTION: Status: ACTIVE | Noted: 2018-05-01

## 2024-01-25 PROBLEM — D62 ACUTE BLOOD LOSS ANEMIA: Status: ACTIVE | Noted: 2020-06-02

## 2024-01-25 LAB
ALBUMIN SERPL-MCNC: 3.7 G/DL (ref 3.5–5.2)
ALBUMIN/GLOB SERPL: 1.2 {RATIO} (ref 1–2.5)
ALP SERPL-CCNC: 145 U/L (ref 35–104)
ALT SERPL-CCNC: 8 U/L (ref 5–33)
ANION GAP SERPL CALCULATED.3IONS-SCNC: 13 MMOL/L (ref 9–17)
AST SERPL-CCNC: 14 U/L
BACTERIA URNS QL MICRO: NORMAL
BASOPHILS # BLD: 0.03 K/UL (ref 0–0.2)
BASOPHILS NFR BLD: 0 % (ref 0–2)
BILIRUB SERPL-MCNC: 0.2 MG/DL (ref 0.3–1.2)
BILIRUB UR QL STRIP: NEGATIVE
BUN SERPL-MCNC: 22 MG/DL (ref 8–23)
CALCIUM SERPL-MCNC: 9.3 MG/DL (ref 8.6–10.4)
CASTS #/AREA URNS LPF: NORMAL /LPF (ref 0–2)
CHLORIDE SERPL-SCNC: 100 MMOL/L (ref 98–107)
CLARITY UR: CLEAR
CO2 SERPL-SCNC: 23 MMOL/L (ref 20–31)
COLOR UR: YELLOW
CREAT SERPL-MCNC: 0.9 MG/DL (ref 0.5–0.9)
EOSINOPHIL # BLD: 0.09 K/UL (ref 0–0.44)
EOSINOPHILS RELATIVE PERCENT: 1 % (ref 1–4)
EPI CELLS #/AREA URNS HPF: NORMAL /HPF (ref 0–5)
ERYTHROCYTE [DISTWIDTH] IN BLOOD BY AUTOMATED COUNT: 14.7 % (ref 11.8–14.4)
GFR SERPL CREATININE-BSD FRML MDRD: >60 ML/MIN/1.73M2
GLUCOSE SERPL-MCNC: 116 MG/DL (ref 70–99)
GLUCOSE UR STRIP-MCNC: NEGATIVE MG/DL
HCT VFR BLD AUTO: 33.6 % (ref 36.3–47.1)
HCT VFR BLD AUTO: 36.8 % (ref 36.3–47.1)
HGB BLD-MCNC: 10.5 G/DL (ref 11.9–15.1)
HGB BLD-MCNC: 11.2 G/DL (ref 11.9–15.1)
HGB UR QL STRIP.AUTO: NEGATIVE
IMM GRANULOCYTES # BLD AUTO: 0.07 K/UL (ref 0–0.3)
IMM GRANULOCYTES NFR BLD: 1 %
KETONES UR STRIP-MCNC: NEGATIVE MG/DL
LACTIC ACID, WHOLE BLOOD: 1.2 MMOL/L (ref 0.7–2.1)
LEUKOCYTE ESTERASE UR QL STRIP: ABNORMAL
LIPASE SERPL-CCNC: 15 U/L (ref 13–60)
LYMPHOCYTES NFR BLD: 1.28 K/UL (ref 1.1–3.7)
LYMPHOCYTES RELATIVE PERCENT: 12 % (ref 24–43)
MCH RBC QN AUTO: 26.9 PG (ref 25.2–33.5)
MCHC RBC AUTO-ENTMCNC: 30.4 G/DL (ref 28.4–34.8)
MCV RBC AUTO: 88.2 FL (ref 82.6–102.9)
MONOCYTES NFR BLD: 0.97 K/UL (ref 0.1–1.2)
MONOCYTES NFR BLD: 9 % (ref 3–12)
NEUTROPHILS NFR BLD: 77 % (ref 36–65)
NEUTS SEG NFR BLD: 8.72 K/UL (ref 1.5–8.1)
NITRITE UR QL STRIP: NEGATIVE
NRBC BLD-RTO: 0 PER 100 WBC
PH UR STRIP: 7.5 [PH] (ref 5–8)
PLATELET # BLD AUTO: 220 K/UL (ref 138–453)
PMV BLD AUTO: 11 FL (ref 8.1–13.5)
POTASSIUM SERPL-SCNC: 4.3 MMOL/L (ref 3.7–5.3)
PROT SERPL-MCNC: 6.8 G/DL (ref 6.4–8.3)
PROT UR STRIP-MCNC: NEGATIVE MG/DL
RBC # BLD AUTO: 4.17 M/UL (ref 3.95–5.11)
RBC # BLD: ABNORMAL 10*6/UL
RBC #/AREA URNS HPF: NORMAL /HPF (ref 0–2)
SODIUM SERPL-SCNC: 136 MMOL/L (ref 135–144)
SP GR UR STRIP: 1.01 (ref 1–1.03)
TROPONIN I SERPL HS-MCNC: 22 NG/L (ref 0–14)
TROPONIN I SERPL HS-MCNC: 23 NG/L (ref 0–14)
UROBILINOGEN UR STRIP-ACNC: NORMAL EU/DL (ref 0–1)
WBC #/AREA URNS HPF: NORMAL /HPF (ref 0–5)
WBC OTHER # BLD: 11.2 K/UL (ref 3.5–11.3)

## 2024-01-25 PROCEDURE — 96375 TX/PRO/DX INJ NEW DRUG ADDON: CPT

## 2024-01-25 PROCEDURE — 6360000002 HC RX W HCPCS

## 2024-01-25 PROCEDURE — 3700000001 HC ADD 15 MINUTES (ANESTHESIA): Performed by: INTERNAL MEDICINE

## 2024-01-25 PROCEDURE — 88305 TISSUE EXAM BY PATHOLOGIST: CPT

## 2024-01-25 PROCEDURE — 3609012400 HC EGD TRANSORAL BIOPSY SINGLE/MULTIPLE: Performed by: INTERNAL MEDICINE

## 2024-01-25 PROCEDURE — C9113 INJ PANTOPRAZOLE SODIUM, VIA: HCPCS | Performed by: STUDENT IN AN ORGANIZED HEALTH CARE EDUCATION/TRAINING PROGRAM

## 2024-01-25 PROCEDURE — 2580000003 HC RX 258: Performed by: STUDENT IN AN ORGANIZED HEALTH CARE EDUCATION/TRAINING PROGRAM

## 2024-01-25 PROCEDURE — 7100000001 HC PACU RECOVERY - ADDTL 15 MIN: Performed by: INTERNAL MEDICINE

## 2024-01-25 PROCEDURE — 99285 EMERGENCY DEPT VISIT HI MDM: CPT

## 2024-01-25 PROCEDURE — 2580000003 HC RX 258

## 2024-01-25 PROCEDURE — 96374 THER/PROPH/DIAG INJ IV PUSH: CPT

## 2024-01-25 PROCEDURE — 99223 1ST HOSP IP/OBS HIGH 75: CPT | Performed by: INTERNAL MEDICINE

## 2024-01-25 PROCEDURE — 2580000003 HC RX 258: Performed by: INTERNAL MEDICINE

## 2024-01-25 PROCEDURE — 6360000002 HC RX W HCPCS: Performed by: STUDENT IN AN ORGANIZED HEALTH CARE EDUCATION/TRAINING PROGRAM

## 2024-01-25 PROCEDURE — 6360000002 HC RX W HCPCS: Performed by: INTERNAL MEDICINE

## 2024-01-25 PROCEDURE — 0DB68ZX EXCISION OF STOMACH, VIA NATURAL OR ARTIFICIAL OPENING ENDOSCOPIC, DIAGNOSTIC: ICD-10-PCS | Performed by: INTERNAL MEDICINE

## 2024-01-25 PROCEDURE — 74177 CT ABD & PELVIS W/CONTRAST: CPT

## 2024-01-25 PROCEDURE — 85014 HEMATOCRIT: CPT

## 2024-01-25 PROCEDURE — 6370000000 HC RX 637 (ALT 250 FOR IP)

## 2024-01-25 PROCEDURE — 2709999900 HC NON-CHARGEABLE SUPPLY: Performed by: INTERNAL MEDICINE

## 2024-01-25 PROCEDURE — 6370000000 HC RX 637 (ALT 250 FOR IP): Performed by: INTERNAL MEDICINE

## 2024-01-25 PROCEDURE — 2580000003 HC RX 258: Performed by: NURSE ANESTHETIST, CERTIFIED REGISTERED

## 2024-01-25 PROCEDURE — 36415 COLL VENOUS BLD VENIPUNCTURE: CPT

## 2024-01-25 PROCEDURE — 7100000000 HC PACU RECOVERY - FIRST 15 MIN: Performed by: INTERNAL MEDICINE

## 2024-01-25 PROCEDURE — 80053 COMPREHEN METABOLIC PANEL: CPT

## 2024-01-25 PROCEDURE — 6360000002 HC RX W HCPCS: Performed by: NURSE ANESTHETIST, CERTIFIED REGISTERED

## 2024-01-25 PROCEDURE — 1200000000 HC SEMI PRIVATE

## 2024-01-25 PROCEDURE — 2500000003 HC RX 250 WO HCPCS: Performed by: NURSE ANESTHETIST, CERTIFIED REGISTERED

## 2024-01-25 PROCEDURE — A4216 STERILE WATER/SALINE, 10 ML: HCPCS | Performed by: STUDENT IN AN ORGANIZED HEALTH CARE EDUCATION/TRAINING PROGRAM

## 2024-01-25 PROCEDURE — 85025 COMPLETE CBC W/AUTO DIFF WBC: CPT

## 2024-01-25 PROCEDURE — 6360000002 HC RX W HCPCS: Performed by: ANESTHESIOLOGY

## 2024-01-25 PROCEDURE — C9113 INJ PANTOPRAZOLE SODIUM, VIA: HCPCS | Performed by: INTERNAL MEDICINE

## 2024-01-25 PROCEDURE — 0DB98ZX EXCISION OF DUODENUM, VIA NATURAL OR ARTIFICIAL OPENING ENDOSCOPIC, DIAGNOSTIC: ICD-10-PCS | Performed by: INTERNAL MEDICINE

## 2024-01-25 PROCEDURE — C9113 INJ PANTOPRAZOLE SODIUM, VIA: HCPCS

## 2024-01-25 PROCEDURE — 81001 URINALYSIS AUTO W/SCOPE: CPT

## 2024-01-25 PROCEDURE — 84484 ASSAY OF TROPONIN QUANT: CPT

## 2024-01-25 PROCEDURE — 83690 ASSAY OF LIPASE: CPT

## 2024-01-25 PROCEDURE — 85018 HEMOGLOBIN: CPT

## 2024-01-25 PROCEDURE — 6360000004 HC RX CONTRAST MEDICATION: Performed by: STUDENT IN AN ORGANIZED HEALTH CARE EDUCATION/TRAINING PROGRAM

## 2024-01-25 PROCEDURE — 3700000000 HC ANESTHESIA ATTENDED CARE: Performed by: INTERNAL MEDICINE

## 2024-01-25 PROCEDURE — 83605 ASSAY OF LACTIC ACID: CPT

## 2024-01-25 RX ORDER — TAMSULOSIN HYDROCHLORIDE 0.4 MG/1
0.4 CAPSULE ORAL DAILY
Status: DISCONTINUED | OUTPATIENT
Start: 2024-01-25 | End: 2024-01-28 | Stop reason: HOSPADM

## 2024-01-25 RX ORDER — ONDANSETRON 4 MG/1
4 TABLET, ORALLY DISINTEGRATING ORAL EVERY 8 HOURS PRN
Status: DISCONTINUED | OUTPATIENT
Start: 2024-01-25 | End: 2024-01-28 | Stop reason: HOSPADM

## 2024-01-25 RX ORDER — HYDRALAZINE HYDROCHLORIDE 20 MG/ML
10 INJECTION INTRAMUSCULAR; INTRAVENOUS
Status: DISCONTINUED | OUTPATIENT
Start: 2024-01-25 | End: 2024-01-25 | Stop reason: HOSPADM

## 2024-01-25 RX ORDER — ALBUTEROL SULFATE 2.5 MG/3ML
2.5 SOLUTION RESPIRATORY (INHALATION) EVERY 6 HOURS PRN
Status: DISCONTINUED | OUTPATIENT
Start: 2024-01-25 | End: 2024-01-28 | Stop reason: HOSPADM

## 2024-01-25 RX ORDER — ATORVASTATIN CALCIUM 20 MG/1
20 TABLET, FILM COATED ORAL DAILY
Status: DISCONTINUED | OUTPATIENT
Start: 2024-01-25 | End: 2024-01-28 | Stop reason: HOSPADM

## 2024-01-25 RX ORDER — POTASSIUM CHLORIDE 20 MEQ/1
40 TABLET, EXTENDED RELEASE ORAL PRN
Status: DISCONTINUED | OUTPATIENT
Start: 2024-01-25 | End: 2024-01-28 | Stop reason: HOSPADM

## 2024-01-25 RX ORDER — ACETAMINOPHEN 325 MG/1
650 TABLET ORAL EVERY 6 HOURS PRN
Status: DISCONTINUED | OUTPATIENT
Start: 2024-01-25 | End: 2024-01-28 | Stop reason: HOSPADM

## 2024-01-25 RX ORDER — BUSPIRONE HYDROCHLORIDE 5 MG/1
10 TABLET ORAL 2 TIMES DAILY
Status: DISCONTINUED | OUTPATIENT
Start: 2024-01-25 | End: 2024-01-28 | Stop reason: HOSPADM

## 2024-01-25 RX ORDER — PROPOFOL 10 MG/ML
INJECTION, EMULSION INTRAVENOUS PRN
Status: DISCONTINUED | OUTPATIENT
Start: 2024-01-25 | End: 2024-01-25 | Stop reason: SDUPTHER

## 2024-01-25 RX ORDER — SODIUM CHLORIDE 9 MG/ML
INJECTION, SOLUTION INTRAVENOUS CONTINUOUS PRN
Status: DISCONTINUED | OUTPATIENT
Start: 2024-01-25 | End: 2024-01-25 | Stop reason: SDUPTHER

## 2024-01-25 RX ORDER — PANTOPRAZOLE SODIUM 40 MG/1
40 TABLET, DELAYED RELEASE ORAL 2 TIMES DAILY
Status: DISCONTINUED | OUTPATIENT
Start: 2024-01-25 | End: 2024-01-25 | Stop reason: SDUPTHER

## 2024-01-25 RX ORDER — AMITRIPTYLINE HYDROCHLORIDE 25 MG/1
25 TABLET, FILM COATED ORAL 3 TIMES DAILY
Status: DISCONTINUED | OUTPATIENT
Start: 2024-01-25 | End: 2024-01-28 | Stop reason: HOSPADM

## 2024-01-25 RX ORDER — ONDANSETRON 2 MG/ML
4 INJECTION INTRAMUSCULAR; INTRAVENOUS EVERY 6 HOURS PRN
Status: DISCONTINUED | OUTPATIENT
Start: 2024-01-25 | End: 2024-01-28 | Stop reason: HOSPADM

## 2024-01-25 RX ORDER — SODIUM CHLORIDE 0.9 % (FLUSH) 0.9 %
5-40 SYRINGE (ML) INJECTION EVERY 12 HOURS SCHEDULED
Status: DISCONTINUED | OUTPATIENT
Start: 2024-01-25 | End: 2024-01-25 | Stop reason: HOSPADM

## 2024-01-25 RX ORDER — MORPHINE SULFATE 4 MG/ML
2 INJECTION INTRAVENOUS
Status: COMPLETED | OUTPATIENT
Start: 2024-01-25 | End: 2024-01-25

## 2024-01-25 RX ORDER — SODIUM CHLORIDE 9 MG/ML
INJECTION, SOLUTION INTRAVENOUS PRN
Status: DISCONTINUED | OUTPATIENT
Start: 2024-01-25 | End: 2024-01-25 | Stop reason: HOSPADM

## 2024-01-25 RX ORDER — QUETIAPINE FUMARATE 25 MG/1
100 TABLET, FILM COATED ORAL NIGHTLY
Status: DISCONTINUED | OUTPATIENT
Start: 2024-01-25 | End: 2024-01-28 | Stop reason: HOSPADM

## 2024-01-25 RX ORDER — MORPHINE SULFATE 4 MG/ML
4 INJECTION INTRAVENOUS
Status: COMPLETED | OUTPATIENT
Start: 2024-01-25 | End: 2024-01-25

## 2024-01-25 RX ORDER — KETOROLAC TROMETHAMINE 15 MG/ML
15 INJECTION, SOLUTION INTRAMUSCULAR; INTRAVENOUS ONCE
Status: COMPLETED | OUTPATIENT
Start: 2024-01-25 | End: 2024-01-25

## 2024-01-25 RX ORDER — QUETIAPINE FUMARATE 25 MG/1
100 TABLET, FILM COATED ORAL DAILY
Status: DISCONTINUED | OUTPATIENT
Start: 2024-01-25 | End: 2024-01-28 | Stop reason: HOSPADM

## 2024-01-25 RX ORDER — ACETAMINOPHEN 650 MG/1
650 SUPPOSITORY RECTAL EVERY 6 HOURS PRN
Status: DISCONTINUED | OUTPATIENT
Start: 2024-01-25 | End: 2024-01-28 | Stop reason: HOSPADM

## 2024-01-25 RX ORDER — MAGNESIUM SULFATE IN WATER 40 MG/ML
2000 INJECTION, SOLUTION INTRAVENOUS PRN
Status: DISCONTINUED | OUTPATIENT
Start: 2024-01-25 | End: 2024-01-28 | Stop reason: HOSPADM

## 2024-01-25 RX ORDER — 0.9 % SODIUM CHLORIDE 0.9 %
1000 INTRAVENOUS SOLUTION INTRAVENOUS ONCE
Status: COMPLETED | OUTPATIENT
Start: 2024-01-25 | End: 2024-01-25

## 2024-01-25 RX ORDER — METOCLOPRAMIDE 10 MG/1
TABLET ORAL
Status: ON HOLD | COMMUNITY
Start: 2024-01-07

## 2024-01-25 RX ORDER — SODIUM CHLORIDE 0.9 % (FLUSH) 0.9 %
5-40 SYRINGE (ML) INJECTION PRN
Status: DISCONTINUED | OUTPATIENT
Start: 2024-01-25 | End: 2024-01-28 | Stop reason: HOSPADM

## 2024-01-25 RX ORDER — POTASSIUM CHLORIDE 7.45 MG/ML
10 INJECTION INTRAVENOUS PRN
Status: DISCONTINUED | OUTPATIENT
Start: 2024-01-25 | End: 2024-01-28 | Stop reason: HOSPADM

## 2024-01-25 RX ORDER — SUCCINYLCHOLINE CHLORIDE 20 MG/ML
INJECTION INTRAMUSCULAR; INTRAVENOUS PRN
Status: DISCONTINUED | OUTPATIENT
Start: 2024-01-25 | End: 2024-01-25 | Stop reason: SDUPTHER

## 2024-01-25 RX ORDER — ESCITALOPRAM OXALATE 10 MG/1
20 TABLET ORAL DAILY
Status: DISCONTINUED | OUTPATIENT
Start: 2024-01-25 | End: 2024-01-28 | Stop reason: HOSPADM

## 2024-01-25 RX ORDER — SENNOSIDES A AND B 8.6 MG/1
1 TABLET, FILM COATED ORAL 2 TIMES DAILY
Status: DISCONTINUED | OUTPATIENT
Start: 2024-01-25 | End: 2024-01-28 | Stop reason: HOSPADM

## 2024-01-25 RX ORDER — METOPROLOL SUCCINATE 25 MG/1
25 TABLET, EXTENDED RELEASE ORAL DAILY
Status: DISCONTINUED | OUTPATIENT
Start: 2024-01-25 | End: 2024-01-28 | Stop reason: HOSPADM

## 2024-01-25 RX ORDER — ONDANSETRON 2 MG/ML
4 INJECTION INTRAMUSCULAR; INTRAVENOUS
Status: COMPLETED | OUTPATIENT
Start: 2024-01-25 | End: 2024-01-25

## 2024-01-25 RX ORDER — POLYETHYLENE GLYCOL 3350 17 G/17G
17 POWDER, FOR SOLUTION ORAL DAILY PRN
Status: DISCONTINUED | OUTPATIENT
Start: 2024-01-25 | End: 2024-01-27

## 2024-01-25 RX ORDER — CLONAZEPAM 0.5 MG/1
0.5 TABLET ORAL 3 TIMES DAILY PRN
Status: DISCONTINUED | OUTPATIENT
Start: 2024-01-25 | End: 2024-01-28 | Stop reason: HOSPADM

## 2024-01-25 RX ORDER — SODIUM CHLORIDE 0.9 % (FLUSH) 0.9 %
5-40 SYRINGE (ML) INJECTION PRN
Status: DISCONTINUED | OUTPATIENT
Start: 2024-01-25 | End: 2024-01-25 | Stop reason: HOSPADM

## 2024-01-25 RX ORDER — FENTANYL CITRATE 50 UG/ML
INJECTION, SOLUTION INTRAMUSCULAR; INTRAVENOUS PRN
Status: DISCONTINUED | OUTPATIENT
Start: 2024-01-25 | End: 2024-01-25 | Stop reason: SDUPTHER

## 2024-01-25 RX ORDER — SODIUM CHLORIDE 9 MG/ML
INJECTION, SOLUTION INTRAVENOUS CONTINUOUS
Status: DISCONTINUED | OUTPATIENT
Start: 2024-01-25 | End: 2024-01-27

## 2024-01-25 RX ORDER — ANASTROZOLE 1 MG/1
TABLET ORAL
Status: ON HOLD | COMMUNITY
Start: 2024-01-20

## 2024-01-25 RX ORDER — LIDOCAINE HYDROCHLORIDE 10 MG/ML
INJECTION, SOLUTION EPIDURAL; INFILTRATION; INTRACAUDAL; PERINEURAL PRN
Status: DISCONTINUED | OUTPATIENT
Start: 2024-01-25 | End: 2024-01-25 | Stop reason: SDUPTHER

## 2024-01-25 RX ORDER — LABETALOL HYDROCHLORIDE 5 MG/ML
10 INJECTION, SOLUTION INTRAVENOUS
Status: DISCONTINUED | OUTPATIENT
Start: 2024-01-25 | End: 2024-01-25 | Stop reason: HOSPADM

## 2024-01-25 RX ORDER — SODIUM CHLORIDE 0.9 % (FLUSH) 0.9 %
5-40 SYRINGE (ML) INJECTION EVERY 12 HOURS SCHEDULED
Status: DISCONTINUED | OUTPATIENT
Start: 2024-01-25 | End: 2024-01-28 | Stop reason: HOSPADM

## 2024-01-25 RX ORDER — SODIUM CHLORIDE 9 MG/ML
INJECTION, SOLUTION INTRAVENOUS PRN
Status: DISCONTINUED | OUTPATIENT
Start: 2024-01-25 | End: 2024-01-28 | Stop reason: HOSPADM

## 2024-01-25 RX ORDER — ONDANSETRON 2 MG/ML
4 INJECTION INTRAMUSCULAR; INTRAVENOUS ONCE
Status: COMPLETED | OUTPATIENT
Start: 2024-01-25 | End: 2024-01-25

## 2024-01-25 RX ADMIN — ONDANSETRON 4 MG: 2 INJECTION INTRAMUSCULAR; INTRAVENOUS at 07:23

## 2024-01-25 RX ADMIN — Medication 100 MG: at 13:44

## 2024-01-25 RX ADMIN — CLONAZEPAM 0.5 MG: 0.5 TABLET ORAL at 16:35

## 2024-01-25 RX ADMIN — TAMSULOSIN HYDROCHLORIDE 0.4 MG: 0.4 CAPSULE ORAL at 16:35

## 2024-01-25 RX ADMIN — MORPHINE SULFATE 4 MG: 4 INJECTION INTRAVENOUS at 04:21

## 2024-01-25 RX ADMIN — SODIUM CHLORIDE: 9 INJECTION, SOLUTION INTRAVENOUS at 22:43

## 2024-01-25 RX ADMIN — HYDROMORPHONE HYDROCHLORIDE 0.25 MG: 1 INJECTION, SOLUTION INTRAMUSCULAR; INTRAVENOUS; SUBCUTANEOUS at 14:54

## 2024-01-25 RX ADMIN — SENNOSIDES 8.6 MG: 8.6 TABLET, FILM COATED ORAL at 21:09

## 2024-01-25 RX ADMIN — HYDROMORPHONE HYDROCHLORIDE 0.25 MG: 1 INJECTION, SOLUTION INTRAMUSCULAR; INTRAVENOUS; SUBCUTANEOUS at 14:45

## 2024-01-25 RX ADMIN — KETOROLAC TROMETHAMINE 15 MG: 15 INJECTION, SOLUTION INTRAMUSCULAR; INTRAVENOUS at 20:56

## 2024-01-25 RX ADMIN — SODIUM CHLORIDE: 9 INJECTION, SOLUTION INTRAVENOUS at 16:24

## 2024-01-25 RX ADMIN — QUETIAPINE FUMARATE 100 MG: 25 TABLET ORAL at 16:35

## 2024-01-25 RX ADMIN — PANTOPRAZOLE SODIUM 40 MG: 40 INJECTION, POWDER, FOR SOLUTION INTRAVENOUS at 04:20

## 2024-01-25 RX ADMIN — SODIUM CHLORIDE, PRESERVATIVE FREE 40 MG: 5 INJECTION INTRAVENOUS at 20:57

## 2024-01-25 RX ADMIN — ESCITALOPRAM OXALATE 20 MG: 10 TABLET ORAL at 16:35

## 2024-01-25 RX ADMIN — SODIUM CHLORIDE 1000 ML: 9 INJECTION, SOLUTION INTRAVENOUS at 04:19

## 2024-01-25 RX ADMIN — QUETIAPINE FUMARATE 100 MG: 25 TABLET ORAL at 20:57

## 2024-01-25 RX ADMIN — SODIUM CHLORIDE, PRESERVATIVE FREE 40 MG: 5 INJECTION INTRAVENOUS at 11:12

## 2024-01-25 RX ADMIN — MORPHINE SULFATE 2 MG: 4 INJECTION INTRAVENOUS at 07:23

## 2024-01-25 RX ADMIN — LIDOCAINE HYDROCHLORIDE 30 MG: 10 INJECTION, SOLUTION EPIDURAL; INFILTRATION; INTRACAUDAL; PERINEURAL at 13:42

## 2024-01-25 RX ADMIN — BUSPIRONE HYDROCHLORIDE 10 MG: 5 TABLET ORAL at 20:57

## 2024-01-25 RX ADMIN — IOPAMIDOL 75 ML: 755 INJECTION, SOLUTION INTRAVENOUS at 05:58

## 2024-01-25 RX ADMIN — AMITRIPTYLINE HYDROCHLORIDE 25 MG: 25 TABLET, FILM COATED ORAL at 21:10

## 2024-01-25 RX ADMIN — SODIUM CHLORIDE: 9 INJECTION, SOLUTION INTRAVENOUS at 13:32

## 2024-01-25 RX ADMIN — METOPROLOL SUCCINATE 25 MG: 25 TABLET, EXTENDED RELEASE ORAL at 16:35

## 2024-01-25 RX ADMIN — MORPHINE SULFATE 2 MG: 4 INJECTION INTRAVENOUS at 08:46

## 2024-01-25 RX ADMIN — ONDANSETRON 4 MG: 2 INJECTION INTRAMUSCULAR; INTRAVENOUS at 14:45

## 2024-01-25 RX ADMIN — ATORVASTATIN CALCIUM 20 MG: 20 TABLET, FILM COATED ORAL at 21:10

## 2024-01-25 RX ADMIN — ONDANSETRON 4 MG: 2 INJECTION INTRAMUSCULAR; INTRAVENOUS at 20:47

## 2024-01-25 RX ADMIN — PROPOFOL 150 MG: 10 INJECTION, EMULSION INTRAVENOUS at 13:43

## 2024-01-25 RX ADMIN — FENTANYL CITRATE 50 MCG: 50 INJECTION, SOLUTION INTRAMUSCULAR; INTRAVENOUS at 13:42

## 2024-01-25 RX ADMIN — PHENYLEPHRINE HYDROCHLORIDE 100 MCG: 10 INJECTION INTRAVENOUS at 13:43

## 2024-01-25 ASSESSMENT — PAIN - FUNCTIONAL ASSESSMENT
PAIN_FUNCTIONAL_ASSESSMENT: NONE - DENIES PAIN
PAIN_FUNCTIONAL_ASSESSMENT: 0-10

## 2024-01-25 ASSESSMENT — PAIN SCALES - GENERAL
PAINLEVEL_OUTOF10: 6
PAINLEVEL_OUTOF10: 6
PAINLEVEL_OUTOF10: 8
PAINLEVEL_OUTOF10: 6
PAINLEVEL_OUTOF10: 6
PAINLEVEL_OUTOF10: 9
PAINLEVEL_OUTOF10: 6
PAINLEVEL_OUTOF10: 5
PAINLEVEL_OUTOF10: 9

## 2024-01-25 ASSESSMENT — PAIN DESCRIPTION - LOCATION
LOCATION: ABDOMEN

## 2024-01-25 ASSESSMENT — ENCOUNTER SYMPTOMS: SHORTNESS OF BREATH: 1

## 2024-01-25 ASSESSMENT — PAIN DESCRIPTION - DESCRIPTORS
DESCRIPTORS: CRAMPING;DISCOMFORT
DESCRIPTORS: CRAMPING
DESCRIPTORS: CRAMPING;DISCOMFORT

## 2024-01-25 ASSESSMENT — PAIN DESCRIPTION - ORIENTATION
ORIENTATION: MID;UPPER

## 2024-01-25 ASSESSMENT — PAIN DESCRIPTION - PAIN TYPE: TYPE: ACUTE PAIN

## 2024-01-25 NOTE — ED NOTES
ED to inpatient nurses report      Chief Complaint:  Chief Complaint   Patient presents with    Emesis     COFFEE ground emesis      Present to ED from: [facility]    MOA:     LOC: alert and orientated to name, place, date  Mobility: Fully dependent  Oxygen Baseline: 96    Current needs required: NA   Pending ED orders: NA  Present condition: Stable    Why did the patient come to the ED? This is a 78 y.o. female who presents to the Emergency Department w/ abdominal pain with coffee ground emesis.  Has had similar emesis in the past without any underlying cause.  Has had an episode of diarrhea.  Per chart review, patient has a history of hiatal hernia with corkscrew esophagus with spasm.  And had esophageal dilation on 10/10/2023.  Patient stating that she is having severe abdominal pain as well.  States it feels like a band like pain around the epigastric region.   What is the plan? Admit  Any procedures or intervention occur? Labs, imaging, *chu insertion*  Any safety concerns??    Mental Status:       Psych Assessment:   Psychosocial  Psychosocial (WDL): Exceptions to WDL  Patient Behaviors: (S) Anxious  Vital signs   Vitals:    01/25/24 0341 01/25/24 0342 01/25/24 0635 01/25/24 0700   BP:   135/65 127/67   Pulse: 87 87 85 81   Resp: 19 13 25 11   Temp:  99.3 °F (37.4 °C)     TempSrc:  Oral     SpO2: 91% 91%  96%   Weight:  81.2 kg (179 lb)          Vitals:  Patient Vitals for the past 24 hrs:   BP Temp Temp src Pulse Resp SpO2 Weight   01/25/24 0700 127/67 -- -- 81 11 96 % --   01/25/24 0635 135/65 -- -- 85 25 -- --   01/25/24 0342 -- 99.3 °F (37.4 °C) Oral 87 13 91 % 81.2 kg (179 lb)   01/25/24 0341 -- -- -- 87 19 91 % --   01/25/24 0339 -- -- -- 87 15 93 % --   01/25/24 0337 (!) 137/55 -- -- -- -- -- --      Visit Vitals  /67   Pulse 81   Temp 99.3 °F (37.4 °C) (Oral)   Resp 11   Wt 81.2 kg (179 lb)   SpO2 96%   BMI 32.74 kg/m²        LDAs:   Peripheral IV 01/25/24 Left;Proximal Forearm (Active)   Site

## 2024-01-25 NOTE — ED PROVIDER NOTES
[facility]   Emergency Department  Faculty Attestation       I performed a history and physical examination of the patient and discussed management with the resident. I reviewed the resident’s note and agree with the documented findings including all diagnostic interpretations and plan of care. Any areas of disagreement are noted on the chart. I was personally present for the key portions of any procedures. I have documented in the chart those procedures where I was not present during the key portions. I have reviewed the emergency nurses triage note. I agree with the chief complaint, past medical history, past surgical history, allergies, medications, social and family history as documented unless otherwise noted below.  For Physician Assistant/ Nurse Practitioner cases/documentation I have personally evaluated this patient and have completed at least one if not all key elements of the E/M (history, physical exam, and MDM). Additional findings are as noted.    Patient Name: Erin Ramirez  MRN: [id number]  : 1945  Primary Care Physician: No primary care provider on file.    Date of evaluationa: 2024   Note Started: 3:39 AM EST    Pertinent Comments     Chief Complaint: No chief complaint on file.       Initial vitals: (If not listed, please see nursing documentation)  ED Triage Vitals   BP Temp Temp Source Pulse Respirations SpO2 Height Weight - Scale   24 0337 24 0342 24 0342 24 0339 24 0339 24 0339 -- 24 034   (!) 137/55 99.3 °F (37.4 °C) Oral 87 15 93 %  81.2 kg (179 lb)         HPI/PE/Impression:  This is a 78 y.o. female who presents to the Emergency Department w/ abdominal pain with coffee ground emesis.  Has had similar emesis in the past without any underlying cause.  Has had an episode of diarrhea.  Per chart review, patient has a history of hiatal hernia with corkscrew esophagus with spasm.  And had esophageal dilation on

## 2024-01-25 NOTE — ANESTHESIA PRE PROCEDURE
Department of Anesthesiology  Preprocedure Note       Name:  Erin Ramirez   Age:  78 y.o.  :  1945                                          MRN:  [id number]         Date:  2024      Surgeon: Surgeon(s):  Brianna Parson MD    Procedure: Procedure(s):  EGD ESOPHAGOGASTRODUODENOSCOPY    Medications prior to admission:   Prior to Admission medications    Medication Sig Start Date End Date Taking? Authorizing Provider   anastrozole (ARIMIDEX) 1 MG tablet  24  Yes Devante Jeong MD   metoclopramide (REGLAN) 10 MG tablet  24  Yes Devante Jeong MD   clonazePAM (KLONOPIN) 0.5 MG tablet Take 1 tablet by mouth 3 times daily as needed for Anxiety for up to 14 days. Max Daily Amount: 1.5 mg 10/26/23 11/9/23  Elliot Reyes MD   QUEtiapine (SEROQUEL) 100 MG tablet Take 1 tablet by mouth nightly    Devante Jeong MD   lactulose encephalopathy 10 GM/15ML SOLN solution Take 15 mLs by mouth as needed (if need for constipation)    Devante Jeong MD   senna (SENOKOT) 8.6 MG tablet Take 1 tablet by mouth as needed for Constipation    Devante Jeong MD   sodium chloride flush 0.9 % injection Infuse 50 mLs intravenously daily    Devante Jeong MD   cyanocobalamin 1000 MCG tablet Take 1 tablet by mouth daily 23   Devante Jeong MD   tamsulosin (FLOMAX) 0.4 MG capsule Take 1 capsule by mouth daily 10/2/23   Karen Borja MD   metoprolol succinate (TOPROL XL) 25 MG extended release tablet Take 1 tablet by mouth daily Hold for SBP<110 10/2/23   Karen Borja MD   gabapentin (NEURONTIN) 300 MG capsule Take 1 capsule by mouth in the morning, at noon, and at bedtime for 5 days. 10/2/23 10/7/23  Karen Borja MD   amitriptyline (ELAVIL) 25 MG tablet Take 1 tablet by mouth 3 times daily    Devante Jeong MD   benztropine (COGENTIN) 0.5 MG tablet Take 1 tablet by mouth 2 times daily    Devante Jeong MD   atorvastatin (LIPITOR) 40 MG tablet  23

## 2024-01-25 NOTE — H&P
female  with a past medical history of hypertension, hyperlipidemia, GERD, hiatal hernia s/p Nissen fundoplication 2/2020, tubular adenoma, IBS, history of smoking, history of ESBL, solitary left kidney, breast cancer on anastrozole who presented with a 1 day history of coffee-ground emesis.  Patient has known history of grade C esophagitis, spastic distal esophagus s/p balloon dilation on Protonix BID.  Patient was admitted for GI evaluation of upper GI bleed with EGD.  Imaging also significant for constipation, which is in line with her diagnosis of IBS.  Although patient reports diarrhea, large stool burden on imaging suggests that her diarrhea is likely overflow fecal incontinence.  Was also found to have a 1 day history of urinary retention, which is recurrent, possibly secondary to concurrent anastrozole use for interstitial cystitis versus constipation.      Principal Problem:    Upper GI bleed   -1 day history of coffee-ground emesis associated with decreased p.o. intake   -2017 H. pylori negative  -10/10/2023 EGD: LA grade C reflux esophagitis and spastic distal esophagus s/p balloon dilation  -1/25/2024 EGD: Erosive/erythematous gastropathy and tortuous distal esophagus moderate hernia.  Follow-up biopsies   -Moderate hiatal hernia and marked circumferential edema of the distal esophagus on CT abdomen   -1/20/2022 colonoscopy: Tubular adenoma in the cecum and sigmoid colon   -Hemoglobin stable, 11.3   -GI consulted.  Repeat EGD in 2 months    -Gastric emptying scan   -Gastric duplex study to rule out vascular causes  -Possible outpatient pH impedance and manometry   -Started on IV Protonix 40 BID.  Will need p.o. Protonix BID [id number] on discharge   -Monitor H&H.  Transfuse to keep hemoglobin above 7   -Continue on continuous IV fluids poor p.o. intake    IBS  Constipation   -Minimize use of narcotics   -Will continue on senna BID.  Will start him GlycoLax as needed    History of recurrent UTI  Urinary

## 2024-01-25 NOTE — ANESTHESIA POSTPROCEDURE EVALUATION
POST- ANESTHESIA EVALUATION       Pt Name: Erin Ramirez  MRN: [id number]  YOB: 1945  Date of evaluation: 1/25/2024  Time:  5:54 PM      BP (!) 129/59   Pulse 88   Temp 98.4 °F (36.9 °C) (Oral)   Resp 19   Ht 1.575 m (5' 2\")   Wt 81.2 kg (179 lb)   SpO2 96%   BMI 32.74 kg/m²      Consciousness Level  Awake  Cardiopulmonary Status  Stable  Pain Adequately Treated YES  Nausea / Vomiting  NO  Adequate Hydration  YES  Anesthesia Related Complications NONE      Electronically signed by Thang Fernandez MD on 1/25/2024 at 5:54 PM       Department of Anesthesiology  Postprocedure Note    Patient: Erin Ramirez  MRN: [id number]  YOB: 1945  Date of evaluation: 1/25/2024    Procedure Summary     Date: 01/25/24 Room / Location: [address]    Anesthesia Start: 1332 Anesthesia Stop: 1411    Procedures:       EGD ESOPHAGOGASTRODUODENOSCOPY      EGD BIOPSY Diagnosis:       Esophagitis      Hematemesis, unspecified whether nausea present      (Esophagitis [K20.90])      (Hematemesis, unspecified whether nausea present [K92.0])    Surgeons: Brianna Parson MD Responsible Provider: Thang Fernandez MD    Anesthesia Type: General ASA Status: 3          Anesthesia Type: General    Alhaji Phase I: Alhaji Score: 10    Alhaji Phase II:      Anesthesia Post Evaluation    No notable events documented.

## 2024-01-25 NOTE — OP NOTE
EGD report    Esophagogastroduodenoscopy (EGD) Procedure Note    Procedure:  EGD with Biopsies    Procedure Date: 1/25/2024    Indications:  Coffee ground emesis    Sedation:  MAC    Attending Physician:  Dr. Brianna Parson MD    Assistant:  None    Procedure Details:    Informed consent was obtained for the procedure, including sedation. Risks of infection, perforation, hemorrhage, adverse drug reaction, and aspiration were discussed. The patient was placed in the left lateral decubitus position. The patient was monitored continuously with ECG tracing, pulse oximetry, blood pressure monitoring, and direct observation.      The gastroscope was inserted into the mouth and advanced under direct vision to second portion of the duodenum.  A careful inspection was made as the gastroscope was withdrawn, including a retroflexed view of the proximal stomach; findings and interventions are described below. Appropriate photodocumentation was obtained.    Findings:  Retropharyngeal area was grossly normal appearing     Esophagus: Severe esophagitis characterized by Grade C esophagitis  The lower esophagus appeared tortuous along with moderate hiatal hernia.      Stomach:    Fundus: normal    Body: normal    Antrum: Significant inflammation characterized by erythema and erosion, Biopsies obtained     Duodenum:     Bulb: normal    First part: Normal    Second Part: Normal  Biopsies obtained    Complications:  None           Estimated blood loss:  Minimal    Disposition:  [facility]           Condition: stable    Specimen Removed: Duodenum, stomach    Impression:    Erosive/erythematous gastropathy, biopsies obtained  Grade C reflux esophagitis  Tortuous distal esophagus and moderate hernia  Duodenal biopsies obtained    Recommendations:  Follow pathology results  PPI BID for 2 months  Will need repeat EGD in 2 months  Will need gastric emptying study  Gastric duplex study to rule out vascular etiology  Consider outpatient pH

## 2024-01-25 NOTE — ED PROVIDER NOTES
[facility] ED  Emergency Department Encounter  Emergency Medicine Resident     Pt Name:Erin Ramirez  MRN: [id number]  Birthdate 1945  Date of evaluation: 1/25/24  PCP:  No primary care provider on file.  Note Started: 3:43 AM EST      CHIEF COMPLAINT       Chief Complaint   Patient presents with    Emesis     COFFEE ground emesis        HISTORY OF PRESENT ILLNESS  (Location/Symptom, Timing/Onset, Context/Setting, Quality, Duration, Modifying Factors, Severity.)      Erin Ramirez is a 78 y.o. female who presents with coffee-ground emesis for the past 24 hours.  Patient reports that she has had some epigastric and diffuse abdominal pain as well.  She reports she is unable to keep even water down.  She reports she has had problems like this in the past and was told by GI that there is nothing they can do for her and to go to [facility] for further evaluation.  She denies any fevers or chills but reports that her pain extends into the lower part of her chest.  She denies any shortness of breath.  She denies blood in her stool but does report an episode of diarrhea.  She reports that she was unable to keep her medications down.  She denies taking any anticoagulation at this time.    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

## 2024-01-25 NOTE — ED PROVIDER NOTES
[facility] ED  Emergency Department  Emergency Medicine Resident Sign-out     Care of Erin Ramirez was assumed from Dr. Khan and is being seen for Emesis (COFFEE ground emesis )  .  The patient's initial evaluation and plan have been discussed with the prior provider who initially evaluated the patient.     EMERGENCY DEPARTMENT COURSE / MEDICAL DECISION MAKING:       MEDICATIONS GIVEN:  Orders Placed This Encounter   Medications    sodium chloride 0.9 % bolus 1,000 mL    morphine injection 4 mg    pantoprazole (PROTONIX) 40 mg in sodium chloride (PF) 0.9 % 10 mL injection    iopamidol (ISOVUE-370) 76 % injection 75 mL    morphine injection 2 mg    ondansetron (ZOFRAN) injection 4 mg    pantoprazole (PROTONIX) 40 mg in sodium chloride (PF) 0.9 % 10 mL injection    morphine injection 2 mg       LABS / RADIOLOGY:     Labs Reviewed   CBC WITH AUTO DIFFERENTIAL - Abnormal; Notable for the following components:       Result Value    Hemoglobin 11.2 (*)     RDW 14.7 (*)     Neutrophils % 77 (*)     Lymphocytes % 12 (*)     Immature Granulocytes 1 (*)     Neutrophils Absolute 8.72 (*)     All other components within normal limits   URINALYSIS WITH REFLEX TO CULTURE - Abnormal; Notable for the following components:    Leukocyte Esterase, Urine MOD (*)     All other components within normal limits   COMPREHENSIVE METABOLIC PANEL - Abnormal; Notable for the following components:    Glucose 116 (*)     Total Bilirubin 0.2 (*)     Alkaline Phosphatase 145 (*)     All other components within normal limits   TROPONIN - Abnormal; Notable for the following components:    Troponin, High Sensitivity 22 (*)     All other components within normal limits   TROPONIN - Abnormal; Notable for the following components:    Troponin, High Sensitivity 23 (*)     All other components within normal limits   LACTIC ACID   LIPASE   PREVIOUS SPECIMEN   MICROSCOPIC URINALYSIS       No results found.    RECENT VITALS:

## 2024-01-25 NOTE — CONSULTS
Shanel Guaman, Daniel, Javon, Salena, Sabrina, Dana, & Rashi   Urology Consultation      Patient:  Erin Ramirez  MRN: [id number]  YOB: 1945    REASON FOR CONSULT:  urinary retention    HISTORY OF PRESENT ILLNESS:   The patient is a 78 y.o. female who presents from SNF with known hx of gastric ulcer again with coffee-ground emesis and abdominal pain. GI consulted for EGD evaluation. In ED, she was found to be retaining urine 1L and so ED placed indwelling chu catheter. She has  history including solitary left kidney, OAB, interstitial cystistitis, history of recurrent UTIs, and we have seen in in the hospital setting within the last 6 months for acute urinary retention where she was managed with intermittent straight cathing, and her OAB medication, Sanctura was discontinued, it appears she is still off this medication.   CT was performed which shows solitary L kidney with tiny simple renal cyst and moderately distended bladder. She also appears constipated, has taken Senekot in the past.  She is quite anxious upon evlauation. States she does not like the chu catheter. She states she thought sh ewas voiding fine at her SNF, but did notice more frequency the last several days. She denies dysuria, hematuria, flank pain, fevers/chills.      Patient's old records, notes and chart reviewed and summarized above.    Past Medical History:    Past Medical History:   Diagnosis Date    A-fib (HCC)     Acquired absence of kidney     Adult hypertrophic pyloric stenosis     Agoraphobia with panic disorder     Agoraphobia without history of panic disorder     Allergic rhinitis     Anemia, unspecified     Anxiety     Anxiety disorder     Arthritis     Atrial fibrillation (HCC)     Back pain     Bronchitis     Caffeine use     8 coffee / day    Cardiomegaly     Carpal tunnel syndrome     Cataracts, bilateral     Centriacinar emphysema (HCC)     Chronic kidney disease, stage III (moderate) ([facility])     Chronic pain  
NONREACTIVE 05/16/2020 01:01 PM    HEPCAB NONREACTIVE 05/16/2020 01:01 PM    HEPBIGM NONREACTIVE 05/16/2020 01:01 PM    HEPAIGM NONREACTIVE 05/16/2020 01:01 PM            Principal Problem:    Upper GI bleed  Resolved Problems:    * No resolved hospital problems. *       GI Impression:  Coffee-ground emesis  Upper GI bleed  Nausea and vomiting  Epigastric abdominal pain  History of reflux esophagitis LA - C  Chronic mild anemia    Recommendations and plan:  -Plan for EGD today  -Keep NPO, Protonix 40 IV BID  -Avoid anticoagulation      This plan was formulated in collaboration with Dr. Blank MD  Please feel free to contact us with any questions or concerns    Please await for final attending attestation    [facility]  Levi Hernandez MD   PGY-1  [phone]  1/25/2024    8:44 AM

## 2024-01-25 NOTE — ED PROVIDER NOTES
FACULTY SIGN-OUT  ADDENDUM     Care of this patient was assumed from previous attending physician. The patient's initial evaluation and plan have been discussed with the prior provider who initially evaluated the patient.      Attestation  I was available and discussed any additional care issues that arose and coordinated the management plans with the resident(s) caring for the patient during my duty period. Any areas of disagreement with resident's documentation of care or procedures are noted on the chart. I was personally present for the key portions of any/all procedures, during my duty period. I have documented in the chart those procedures where I was not present during the key portions.       ED COURSE      The patient was given the following medications:  Orders Placed This Encounter   Medications    sodium chloride 0.9 % bolus 1,000 mL    morphine injection 4 mg    pantoprazole (PROTONIX) 40 mg in sodium chloride (PF) 0.9 % 10 mL injection    iopamidol (ISOVUE-370) 76 % injection 75 mL       RECENT VITALS:   Temp: 99.3 °F (37.4 °C), Pulse: 81, Respirations: 11, BP: 127/67    MEDICAL DECISION MAKING        Erin Ramirez is a 78 y.o. female who presents to the Emergency Department with complaints of abd pain , vomiting CT pending       Hamilton Santamaria MD, MD, F.A.C.E.P.  Attending Emergency Physician    (Please note that portions of this note were completed with a voice recognition program.  Efforts were made to edit the dictations but occasionally words are mis-transcribed.)         Hamilton Santamaria MD  01/25/24 1119

## 2024-01-25 NOTE — ED NOTES
Pt presents to the ED via [facility] with c/o of coffee ground emesis.   Pt is resident of [facility] and had approximately 5 episodes of coffee ground emesis.   Pt has history of GI bleed, was on anticoagulation for a-fib but not currently on blood thinners.   Pt has breast cancer on the left breast but has had no surgery or lymph node removal. Pt takes an oral chemotherapy pill.   Pt is alert and oriented x4, very, very anxious.   Pt is dependent for mobility.   Pt states she has abdominal pain and uses briefs for urination.   Pt placed on full cardiac monitor.   Call light in reach, white board updated.

## 2024-01-26 ENCOUNTER — APPOINTMENT (OUTPATIENT)
Dept: VASCULAR LAB | Age: 79
DRG: 382 | End: 2024-01-26
Payer: MEDICARE

## 2024-01-26 ENCOUNTER — APPOINTMENT (OUTPATIENT)
Dept: NUCLEAR MEDICINE | Age: 79
DRG: 382 | End: 2024-01-26
Payer: MEDICARE

## 2024-01-26 LAB
ANION GAP SERPL CALCULATED.3IONS-SCNC: 15 MMOL/L (ref 9–17)
BASOPHILS # BLD: <0.03 K/UL (ref 0–0.2)
BASOPHILS NFR BLD: 0 % (ref 0–2)
BUN SERPL-MCNC: 16 MG/DL (ref 8–23)
CALCIUM SERPL-MCNC: 8.4 MG/DL (ref 8.6–10.4)
CHLORIDE SERPL-SCNC: 105 MMOL/L (ref 98–107)
CO2 SERPL-SCNC: 16 MMOL/L (ref 20–31)
CREAT SERPL-MCNC: 1 MG/DL (ref 0.5–0.9)
EOSINOPHIL # BLD: 0.23 K/UL (ref 0–0.44)
EOSINOPHILS RELATIVE PERCENT: 4 % (ref 1–4)
ERYTHROCYTE [DISTWIDTH] IN BLOOD BY AUTOMATED COUNT: 15.3 % (ref 11.8–14.4)
GFR SERPL CREATININE-BSD FRML MDRD: 58 ML/MIN/1.73M2
GLUCOSE SERPL-MCNC: 93 MG/DL (ref 70–99)
HCT VFR BLD AUTO: 31.9 % (ref 36.3–47.1)
HGB BLD-MCNC: 9.2 G/DL (ref 11.9–15.1)
IMM GRANULOCYTES # BLD AUTO: 0.06 K/UL (ref 0–0.3)
IMM GRANULOCYTES NFR BLD: 1 %
LYMPHOCYTES NFR BLD: 1.04 K/UL (ref 1.1–3.7)
LYMPHOCYTES RELATIVE PERCENT: 20 % (ref 24–43)
MCH RBC QN AUTO: 27.5 PG (ref 25.2–33.5)
MCHC RBC AUTO-ENTMCNC: 28.8 G/DL (ref 28.4–34.8)
MCV RBC AUTO: 95.5 FL (ref 82.6–102.9)
MONOCYTES NFR BLD: 0.65 K/UL (ref 0.1–1.2)
MONOCYTES NFR BLD: 12 % (ref 3–12)
NEUTROPHILS NFR BLD: 63 % (ref 36–65)
NEUTS SEG NFR BLD: 3.33 K/UL (ref 1.5–8.1)
NRBC BLD-RTO: 0 PER 100 WBC
PLATELET # BLD AUTO: 174 K/UL (ref 138–453)
PMV BLD AUTO: 11 FL (ref 8.1–13.5)
POTASSIUM SERPL-SCNC: 4.3 MMOL/L (ref 3.7–5.3)
RBC # BLD AUTO: 3.34 M/UL (ref 3.95–5.11)
RBC # BLD: ABNORMAL 10*6/UL
SODIUM SERPL-SCNC: 136 MMOL/L (ref 135–144)
WBC OTHER # BLD: 5.3 K/UL (ref 3.5–11.3)

## 2024-01-26 PROCEDURE — 78264 GASTRIC EMPTYING IMG STUDY: CPT

## 2024-01-26 PROCEDURE — 6370000000 HC RX 637 (ALT 250 FOR IP)

## 2024-01-26 PROCEDURE — 1200000000 HC SEMI PRIVATE

## 2024-01-26 PROCEDURE — C9113 INJ PANTOPRAZOLE SODIUM, VIA: HCPCS | Performed by: INTERNAL MEDICINE

## 2024-01-26 PROCEDURE — 6370000000 HC RX 637 (ALT 250 FOR IP): Performed by: INTERNAL MEDICINE

## 2024-01-26 PROCEDURE — 80048 BASIC METABOLIC PNL TOTAL CA: CPT

## 2024-01-26 PROCEDURE — 36415 COLL VENOUS BLD VENIPUNCTURE: CPT

## 2024-01-26 PROCEDURE — 6360000002 HC RX W HCPCS: Performed by: INTERNAL MEDICINE

## 2024-01-26 PROCEDURE — 93976 VASCULAR STUDY: CPT

## 2024-01-26 PROCEDURE — 2580000003 HC RX 258: Performed by: INTERNAL MEDICINE

## 2024-01-26 PROCEDURE — A4216 STERILE WATER/SALINE, 10 ML: HCPCS | Performed by: INTERNAL MEDICINE

## 2024-01-26 PROCEDURE — 3430000000 HC RX DIAGNOSTIC RADIOPHARMACEUTICAL: Performed by: INTERNAL MEDICINE

## 2024-01-26 PROCEDURE — A9541 TC99M SULFUR COLLOID: HCPCS | Performed by: INTERNAL MEDICINE

## 2024-01-26 PROCEDURE — 99232 SBSQ HOSP IP/OBS MODERATE 35: CPT | Performed by: INTERNAL MEDICINE

## 2024-01-26 PROCEDURE — 85025 COMPLETE CBC W/AUTO DIFF WBC: CPT

## 2024-01-26 PROCEDURE — 6360000002 HC RX W HCPCS

## 2024-01-26 RX ORDER — OXYCODONE HYDROCHLORIDE AND ACETAMINOPHEN 5; 325 MG/1; MG/1
1 TABLET ORAL EVERY 6 HOURS PRN
Status: DISCONTINUED | OUTPATIENT
Start: 2024-01-26 | End: 2024-01-28 | Stop reason: HOSPADM

## 2024-01-26 RX ORDER — LIDOCAINE 4 G/G
1 PATCH TOPICAL DAILY
Status: DISCONTINUED | OUTPATIENT
Start: 2024-01-26 | End: 2024-01-28 | Stop reason: HOSPADM

## 2024-01-26 RX ORDER — ANASTROZOLE 1 MG/1
1 TABLET ORAL DAILY
Status: DISCONTINUED | OUTPATIENT
Start: 2024-01-26 | End: 2024-01-28 | Stop reason: HOSPADM

## 2024-01-26 RX ORDER — TECHNETIUM TC 99M SULFUR COLLOID 2 MG
1 KIT MISCELLANEOUS
Status: COMPLETED | OUTPATIENT
Start: 2024-01-26 | End: 2024-01-26

## 2024-01-26 RX ORDER — KETOROLAC TROMETHAMINE 15 MG/ML
15 INJECTION, SOLUTION INTRAMUSCULAR; INTRAVENOUS EVERY 4 HOURS PRN
Status: COMPLETED | OUTPATIENT
Start: 2024-01-26 | End: 2024-01-26

## 2024-01-26 RX ADMIN — AMITRIPTYLINE HYDROCHLORIDE 25 MG: 25 TABLET, FILM COATED ORAL at 14:20

## 2024-01-26 RX ADMIN — METOPROLOL SUCCINATE 25 MG: 25 TABLET, EXTENDED RELEASE ORAL at 14:29

## 2024-01-26 RX ADMIN — ATORVASTATIN CALCIUM 20 MG: 20 TABLET, FILM COATED ORAL at 14:20

## 2024-01-26 RX ADMIN — SODIUM CHLORIDE, PRESERVATIVE FREE 40 MG: 5 INJECTION INTRAVENOUS at 21:08

## 2024-01-26 RX ADMIN — BUSPIRONE HYDROCHLORIDE 10 MG: 5 TABLET ORAL at 21:07

## 2024-01-26 RX ADMIN — SODIUM CHLORIDE, PRESERVATIVE FREE 40 MG: 5 INJECTION INTRAVENOUS at 21:06

## 2024-01-26 RX ADMIN — SODIUM CHLORIDE: 9 INJECTION, SOLUTION INTRAVENOUS at 08:54

## 2024-01-26 RX ADMIN — SODIUM CHLORIDE, PRESERVATIVE FREE 40 MG: 5 INJECTION INTRAVENOUS at 14:43

## 2024-01-26 RX ADMIN — SENNOSIDES 8.6 MG: 8.6 TABLET, FILM COATED ORAL at 21:07

## 2024-01-26 RX ADMIN — Medication 1 MILLICURIE: at 10:15

## 2024-01-26 RX ADMIN — OXYCODONE HYDROCHLORIDE AND ACETAMINOPHEN 1 TABLET: 5; 325 TABLET ORAL at 15:12

## 2024-01-26 RX ADMIN — KETOROLAC TROMETHAMINE 15 MG: 15 INJECTION, SOLUTION INTRAMUSCULAR; INTRAVENOUS at 08:57

## 2024-01-26 RX ADMIN — BUSPIRONE HYDROCHLORIDE 10 MG: 5 TABLET ORAL at 14:19

## 2024-01-26 RX ADMIN — AMITRIPTYLINE HYDROCHLORIDE 25 MG: 25 TABLET, FILM COATED ORAL at 21:10

## 2024-01-26 RX ADMIN — OXYCODONE HYDROCHLORIDE AND ACETAMINOPHEN 1 TABLET: 5; 325 TABLET ORAL at 21:07

## 2024-01-26 RX ADMIN — CLONAZEPAM 0.5 MG: 0.5 TABLET ORAL at 21:07

## 2024-01-26 RX ADMIN — QUETIAPINE FUMARATE 100 MG: 25 TABLET ORAL at 21:06

## 2024-01-26 RX ADMIN — CLONAZEPAM 0.5 MG: 0.5 TABLET ORAL at 10:14

## 2024-01-26 RX ADMIN — Medication 1000 MG: at 14:41

## 2024-01-26 RX ADMIN — TAMSULOSIN HYDROCHLORIDE 0.4 MG: 0.4 CAPSULE ORAL at 14:20

## 2024-01-26 RX ADMIN — ACETAMINOPHEN 650 MG: 325 TABLET ORAL at 14:19

## 2024-01-26 RX ADMIN — ANASTROZOLE 1 MG: 1 TABLET, COATED ORAL at 15:49

## 2024-01-26 RX ADMIN — SODIUM CHLORIDE, PRESERVATIVE FREE 40 MG: 5 INJECTION INTRAVENOUS at 21:10

## 2024-01-26 RX ADMIN — ESCITALOPRAM OXALATE 20 MG: 10 TABLET ORAL at 14:20

## 2024-01-26 RX ADMIN — CLONAZEPAM 0.5 MG: 0.5 TABLET ORAL at 03:40

## 2024-01-26 RX ADMIN — QUETIAPINE FUMARATE 100 MG: 25 TABLET ORAL at 14:20

## 2024-01-26 RX ADMIN — KETOROLAC TROMETHAMINE 15 MG: 15 INJECTION, SOLUTION INTRAMUSCULAR; INTRAVENOUS at 04:11

## 2024-01-26 ASSESSMENT — PAIN DESCRIPTION - LOCATION
LOCATION: ABDOMEN
LOCATION: RIB CAGE
LOCATION: RIB CAGE
LOCATION: ABDOMEN

## 2024-01-26 ASSESSMENT — PAIN SCALES - GENERAL
PAINLEVEL_OUTOF10: 3
PAINLEVEL_OUTOF10: 9
PAINLEVEL_OUTOF10: 6
PAINLEVEL_OUTOF10: 7
PAINLEVEL_OUTOF10: 6

## 2024-01-26 ASSESSMENT — PAIN DESCRIPTION - ORIENTATION: ORIENTATION: RIGHT

## 2024-01-26 ASSESSMENT — PAIN DESCRIPTION - DESCRIPTORS
DESCRIPTORS: STABBING
DESCRIPTORS: SHARP

## 2024-01-26 NOTE — DISCHARGE INSTR - COC
Continuity of Care Form    Patient Name: Erin Ramirez   :  1945  MRN:  [id number]    Admit date:  2024  Discharge date:  ***    Code Status Order: Full Code   Advance Directives:   Advance Care Flowsheet Documentation       Date/Time Healthcare Directive Type of Healthcare Directive Copy in Chart Healthcare Agent Appointed Healthcare Agent's Name Healthcare Agent's Phone Number    24 [phone] No, patient does not have an advance directive for healthcare treatment -- -- -- -- --            Admitting Physician:  Patt Solis MD  PCP: No primary care provider on file.    Discharging Nurse: ***  Discharging Hospital Unit/Room#: 0345/0345-02  Discharging Unit Phone Number: ***    Emergency Contact:   Extended Emergency Contact Information  Primary Emergency Contact: Hamilton Ramirez  [place] Phone: [phone]  Relation: Child    Past Surgical History:  Past Surgical History:   Procedure Laterality Date    APPENDECTOMY      CARPAL TUNNEL RELEASE      CHOLECYSTECTOMY, LAPAROSCOPIC N/A 2020    XI LAPAROSCOPIC ROBOTIC CHOLECYSTECTOMY, PYLOROPLASTY performed by Balwinder Zamudio MD at [facility] OR    COLONOSCOPY      COLONOSCOPY N/A 2022    COLONOSCOPY POLYPECTOMY HOT performed by Antoine Houser MD at [facility] OR    CYSTOSCOPY      ERCP  2020    ** CASE IN OR WITY GI STAFF** ERCP STENT INSERTION performed by Antoine Houser MD at [facility] Endoscopy    ERCP  2020    ** CASE IN OR WITH GI STAFF**ERCP SPHINCTER/PAPILLOTOMY performed by Antoine Houser MD at [facility] Endoscopy    ERCP  2020    ** CASE IN OR WITH GI STAFF**ERCP DILATION BALLOON performed by Antoine Houser MD at [facility] Endoscopy    ERCP N/A 2021    ERCP STENT REMOVAL performed by Ezekiel Weber MD at [facility] Endoscopy    ERCP  2021    ERCP STONE REMOVAL performed by Ezekiel Weber MD at [facility]    ESOPHAGOGASTRODUODENOSCOPY      ESOPHAGOGASTRODUODENOSCOPY N/A 2024    EGD BIOPSY    EYE SURGERY      patricia IOL    GASTRIC  Juan Jaramillo is a 80 y.o. female    Chief Complaint   Patient presents with    Hypertension     1. Have you been to the ER, urgent care clinic since your last visit? Hospitalized since your last visit? No    2. Have you seen or consulted any other health care providers outside of the [address] [place] [address] since your last visit? Include any pap smears or colon screening.  No

## 2024-01-26 NOTE — PLAN OF CARE
Updated patient and her son Franklin over the phone regarding patient's medical condition.  GI to follow-up with gastric duplex studies with patient.  Future plan was shared with son by permission from patient herself.    Electronically signed by Paul Pretty MD on 1/26/2024 at 3:52 PM    Paul Pretty MD  Internal Medicine Resident, PGY- 2  [facility],  [place], [place].  3:52 PM     This note is created with the assistance of a speech-recognition program. While intending to generate a document that actually reflects the content of the visit, no guarantees can be provided that every mistake has been identified and corrected by editing.

## 2024-01-26 NOTE — PLAN OF CARE
Problem: Discharge Planning  Goal: Discharge to home or other facility with appropriate resources  1/26/2024 1720 by Faith Quinones RN  Outcome: Progressing  1/26/2024 0427 by Lisa Colindres RN  Outcome: Progressing     Problem: Safety - Adult  Goal: Free from fall injury  1/26/2024 1720 by Faith Quinones RN  Outcome: Progressing  1/26/2024 0427 by Lisa Colindres RN  Outcome: Progressing     Problem: Pain  Goal: Verbalizes/displays adequate comfort level or baseline comfort level  1/26/2024 1720 by Faith Quinones RN  Outcome: Progressing  1/26/2024 0427 by Lisa Colindres RN  Outcome: Progressing     Problem: Skin/Tissue Integrity  Goal: Absence of new skin breakdown  Description: 1.  Monitor for areas of redness and/or skin breakdown  2.  Assess vascular access sites hourly  3.  Every 4-6 hours minimum:  Change oxygen saturation probe site  4.  Every 4-6 hours:  If on nasal continuous positive airway pressure, respiratory therapy assess nares and determine need for appliance change or resting period.  1/26/2024 1720 by Faith Quinones RN  Outcome: Progressing  1/26/2024 0427 by Lisa Colindres RN  Outcome: Progressing

## 2024-01-27 VITALS
RESPIRATION RATE: 18 BRPM | BODY MASS INDEX: 32.94 KG/M2 | HEART RATE: 78 BPM | OXYGEN SATURATION: 94 % | SYSTOLIC BLOOD PRESSURE: 158 MMHG | WEIGHT: 179 LBS | HEIGHT: 62 IN | DIASTOLIC BLOOD PRESSURE: 84 MMHG | TEMPERATURE: 98.2 F

## 2024-01-27 LAB
ANION GAP SERPL CALCULATED.3IONS-SCNC: 9 MMOL/L (ref 9–17)
BASOPHILS # BLD: <0.03 K/UL (ref 0–0.2)
BASOPHILS NFR BLD: 0 % (ref 0–2)
BUN SERPL-MCNC: 13 MG/DL (ref 8–23)
CALCIUM SERPL-MCNC: 8.6 MG/DL (ref 8.6–10.4)
CHLORIDE SERPL-SCNC: 106 MMOL/L (ref 98–107)
CO2 SERPL-SCNC: 22 MMOL/L (ref 20–31)
CREAT SERPL-MCNC: 0.9 MG/DL (ref 0.5–0.9)
ECHO BSA: 1.88 M2
EOSINOPHIL # BLD: 0.31 K/UL (ref 0–0.44)
EOSINOPHILS RELATIVE PERCENT: 6 % (ref 1–4)
ERYTHROCYTE [DISTWIDTH] IN BLOOD BY AUTOMATED COUNT: 15 % (ref 11.8–14.4)
GFR SERPL CREATININE-BSD FRML MDRD: >60 ML/MIN/1.73M2
GLUCOSE SERPL-MCNC: 88 MG/DL (ref 70–99)
HCT VFR BLD AUTO: 33.5 % (ref 36.3–47.1)
HGB BLD-MCNC: 10.2 G/DL (ref 11.9–15.1)
IMM GRANULOCYTES # BLD AUTO: 0.05 K/UL (ref 0–0.3)
IMM GRANULOCYTES NFR BLD: 1 %
LYMPHOCYTES NFR BLD: 0.97 K/UL (ref 1.1–3.7)
LYMPHOCYTES RELATIVE PERCENT: 18 % (ref 24–43)
MCH RBC QN AUTO: 27.3 PG (ref 25.2–33.5)
MCHC RBC AUTO-ENTMCNC: 30.4 G/DL (ref 28.4–34.8)
MCV RBC AUTO: 89.6 FL (ref 82.6–102.9)
MONOCYTES NFR BLD: 0.51 K/UL (ref 0.1–1.2)
MONOCYTES NFR BLD: 10 % (ref 3–12)
NEUTROPHILS NFR BLD: 65 % (ref 36–65)
NEUTS SEG NFR BLD: 3.47 K/UL (ref 1.5–8.1)
NRBC BLD-RTO: 0 PER 100 WBC
PLATELET # BLD AUTO: 179 K/UL (ref 138–453)
PMV BLD AUTO: 10.7 FL (ref 8.1–13.5)
POTASSIUM SERPL-SCNC: 4.1 MMOL/L (ref 3.7–5.3)
RBC # BLD AUTO: 3.74 M/UL (ref 3.95–5.11)
RBC # BLD: ABNORMAL 10*6/UL
SODIUM SERPL-SCNC: 137 MMOL/L (ref 135–144)
VAS AORTA PROX PSV: 62.5 CM/S
VAS AORTA SUPRARENAL PSV: 62.5 CM/S
VAS CELIAC EDV: 35.5 CM/S
VAS CELIAC PSV: 136 CM/S
VAS COMMON HEPATIC EDV: 25.3 CM/S
VAS COMMON HEPATIC PSV: 135 CM/S
VAS DIST SMA EDV: 0 CM/S
VAS DIST SMA PSV: 145 CM/S
VAS IMA EDV: 15.2 CM/S
VAS IMA PSV: 158 CM/S
VAS MID SMA EDV: 16.6 CM/S
VAS MID SMA PSV: 210 CM/S
VAS ORIGIN SMA EDV: 23.4 CM/S
VAS ORIGIN SMA PSV: 205 CM/S
VAS PROX SMA EDV: 24.3 CM/S
VAS PROX SMA PSV: 226 CM/S
VAS SPLENIC EDV: 33.1 CM/S
VAS SPLENIC PSV: 153 CM/S
WBC OTHER # BLD: 5.3 K/UL (ref 3.5–11.3)

## 2024-01-27 PROCEDURE — A4216 STERILE WATER/SALINE, 10 ML: HCPCS | Performed by: INTERNAL MEDICINE

## 2024-01-27 PROCEDURE — 6360000002 HC RX W HCPCS

## 2024-01-27 PROCEDURE — 2580000003 HC RX 258: Performed by: INTERNAL MEDICINE

## 2024-01-27 PROCEDURE — 6370000000 HC RX 637 (ALT 250 FOR IP)

## 2024-01-27 PROCEDURE — 6360000002 HC RX W HCPCS: Performed by: INTERNAL MEDICINE

## 2024-01-27 PROCEDURE — 97162 PT EVAL MOD COMPLEX 30 MIN: CPT

## 2024-01-27 PROCEDURE — 36415 COLL VENOUS BLD VENIPUNCTURE: CPT

## 2024-01-27 PROCEDURE — C9113 INJ PANTOPRAZOLE SODIUM, VIA: HCPCS | Performed by: INTERNAL MEDICINE

## 2024-01-27 PROCEDURE — 99232 SBSQ HOSP IP/OBS MODERATE 35: CPT | Performed by: INTERNAL MEDICINE

## 2024-01-27 PROCEDURE — 2580000003 HC RX 258

## 2024-01-27 PROCEDURE — 85025 COMPLETE CBC W/AUTO DIFF WBC: CPT

## 2024-01-27 PROCEDURE — 97530 THERAPEUTIC ACTIVITIES: CPT

## 2024-01-27 PROCEDURE — 1200000000 HC SEMI PRIVATE

## 2024-01-27 PROCEDURE — 6370000000 HC RX 637 (ALT 250 FOR IP): Performed by: INTERNAL MEDICINE

## 2024-01-27 PROCEDURE — 80048 BASIC METABOLIC PNL TOTAL CA: CPT

## 2024-01-27 PROCEDURE — 93976 VASCULAR STUDY: CPT | Performed by: SURGERY

## 2024-01-27 RX ORDER — CEPHALEXIN 250 MG/1
250 CAPSULE ORAL 4 TIMES DAILY
Qty: 12 CAPSULE | Refills: 0 | Status: SHIPPED | OUTPATIENT
Start: 2024-01-27 | End: 2024-01-30

## 2024-01-27 RX ORDER — POLYETHYLENE GLYCOL 3350 17 G/17G
17 POWDER, FOR SOLUTION ORAL DAILY
Status: DISCONTINUED | OUTPATIENT
Start: 2024-01-27 | End: 2024-01-28 | Stop reason: HOSPADM

## 2024-01-27 RX ORDER — CLONAZEPAM 0.5 MG/1
0.5 TABLET ORAL 3 TIMES DAILY PRN
Qty: 15 TABLET | Refills: 0 | Status: ON HOLD | OUTPATIENT
Start: 2024-01-27 | End: 2024-02-03

## 2024-01-27 RX ORDER — OXYCODONE HYDROCHLORIDE AND ACETAMINOPHEN 5; 325 MG/1; MG/1
1 TABLET ORAL EVERY 6 HOURS PRN
Qty: 10 TABLET | Refills: 0 | Status: SHIPPED | OUTPATIENT
Start: 2024-01-27 | End: 2024-01-30

## 2024-01-27 RX ADMIN — AMITRIPTYLINE HYDROCHLORIDE 25 MG: 25 TABLET, FILM COATED ORAL at 13:13

## 2024-01-27 RX ADMIN — SODIUM CHLORIDE, PRESERVATIVE FREE 40 MG: 5 INJECTION INTRAVENOUS at 20:04

## 2024-01-27 RX ADMIN — OXYCODONE HYDROCHLORIDE AND ACETAMINOPHEN 1 TABLET: 5; 325 TABLET ORAL at 10:11

## 2024-01-27 RX ADMIN — BUSPIRONE HYDROCHLORIDE 10 MG: 5 TABLET ORAL at 20:11

## 2024-01-27 RX ADMIN — CLONAZEPAM 0.5 MG: 0.5 TABLET ORAL at 10:11

## 2024-01-27 RX ADMIN — OXYCODONE HYDROCHLORIDE AND ACETAMINOPHEN 1 TABLET: 5; 325 TABLET ORAL at 22:41

## 2024-01-27 RX ADMIN — CLONAZEPAM 0.5 MG: 0.5 TABLET ORAL at 20:12

## 2024-01-27 RX ADMIN — SENNOSIDES 8.6 MG: 8.6 TABLET, FILM COATED ORAL at 08:24

## 2024-01-27 RX ADMIN — BUSPIRONE HYDROCHLORIDE 10 MG: 5 TABLET ORAL at 08:23

## 2024-01-27 RX ADMIN — ANASTROZOLE 1 MG: 1 TABLET, COATED ORAL at 08:24

## 2024-01-27 RX ADMIN — QUETIAPINE FUMARATE 100 MG: 25 TABLET ORAL at 20:11

## 2024-01-27 RX ADMIN — POLYETHYLENE GLYCOL 3350 17 G: 17 POWDER, FOR SOLUTION ORAL at 10:12

## 2024-01-27 RX ADMIN — CLONAZEPAM 0.5 MG: 0.5 TABLET ORAL at 04:58

## 2024-01-27 RX ADMIN — SENNOSIDES 8.6 MG: 8.6 TABLET, FILM COATED ORAL at 20:11

## 2024-01-27 RX ADMIN — Medication 1000 MG: at 13:13

## 2024-01-27 RX ADMIN — AMITRIPTYLINE HYDROCHLORIDE 25 MG: 25 TABLET, FILM COATED ORAL at 20:12

## 2024-01-27 RX ADMIN — SODIUM CHLORIDE: 9 INJECTION, SOLUTION INTRAVENOUS at 08:22

## 2024-01-27 RX ADMIN — OXYCODONE HYDROCHLORIDE AND ACETAMINOPHEN 1 TABLET: 5; 325 TABLET ORAL at 16:08

## 2024-01-27 RX ADMIN — TAMSULOSIN HYDROCHLORIDE 0.4 MG: 0.4 CAPSULE ORAL at 08:24

## 2024-01-27 RX ADMIN — AMITRIPTYLINE HYDROCHLORIDE 25 MG: 25 TABLET, FILM COATED ORAL at 08:24

## 2024-01-27 RX ADMIN — SODIUM CHLORIDE, PRESERVATIVE FREE 40 MG: 5 INJECTION INTRAVENOUS at 08:28

## 2024-01-27 RX ADMIN — OXYCODONE HYDROCHLORIDE AND ACETAMINOPHEN 1 TABLET: 5; 325 TABLET ORAL at 03:52

## 2024-01-27 RX ADMIN — SODIUM CHLORIDE, PRESERVATIVE FREE 10 ML: 5 INJECTION INTRAVENOUS at 20:12

## 2024-01-27 RX ADMIN — ONDANSETRON 4 MG: 2 INJECTION INTRAMUSCULAR; INTRAVENOUS at 20:12

## 2024-01-27 RX ADMIN — ESCITALOPRAM OXALATE 20 MG: 10 TABLET ORAL at 08:24

## 2024-01-27 RX ADMIN — QUETIAPINE FUMARATE 100 MG: 25 TABLET ORAL at 08:24

## 2024-01-27 RX ADMIN — METOPROLOL SUCCINATE 25 MG: 25 TABLET, EXTENDED RELEASE ORAL at 08:24

## 2024-01-27 RX ADMIN — ACETAMINOPHEN 650 MG: 325 TABLET ORAL at 06:20

## 2024-01-27 RX ADMIN — ATORVASTATIN CALCIUM 20 MG: 20 TABLET, FILM COATED ORAL at 08:24

## 2024-01-27 ASSESSMENT — PAIN SCALES - GENERAL
PAINLEVEL_OUTOF10: 8
PAINLEVEL_OUTOF10: 7
PAINLEVEL_OUTOF10: 8

## 2024-01-27 ASSESSMENT — PAIN DESCRIPTION - ORIENTATION: ORIENTATION: RIGHT

## 2024-01-27 ASSESSMENT — PAIN DESCRIPTION - LOCATION
LOCATION: ABDOMEN
LOCATION: ABDOMEN

## 2024-01-27 ASSESSMENT — PAIN DESCRIPTION - DESCRIPTORS
DESCRIPTORS: STABBING
DESCRIPTORS: STABBING

## 2024-01-27 NOTE — PLAN OF CARE
Problem: Physical Therapy - Adult  Goal: By Discharge: Performs mobility at highest level of function for planned discharge setting.  See evaluation for individualized goals.  1/27/2024 0946 by Bridgette Delgado, PT  Outcome: Progressing     Problem: Discharge Planning  Goal: Discharge to home or other facility with appropriate resources  Outcome: Adequate for Discharge     Problem: Safety - Adult  Goal: Free from fall injury  Outcome: Adequate for Discharge     Problem: Pain  Goal: Verbalizes/displays adequate comfort level or baseline comfort level  Outcome: Adequate for Discharge     Problem: Skin/Tissue Integrity  Goal: Absence of new skin breakdown  Description: 1.  Monitor for areas of redness and/or skin breakdown  2.  Assess vascular access sites hourly  3.  Every 4-6 hours minimum:  Change oxygen saturation probe site  4.  Every 4-6 hours:  If on nasal continuous positive airway pressure, respiratory therapy assess nares and determine need for appliance change or resting period.  Outcome: Adequate for Discharge

## 2024-01-27 NOTE — PLAN OF CARE
Problem: Discharge Planning  Goal: Discharge to home or other facility with appropriate resources  1/27/2024 1722 by Faith Quinones RN  Outcome: Completed  1/27/2024 1722 by Faith Quinones RN  Outcome: Adequate for Discharge     Problem: Safety - Adult  Goal: Free from fall injury  1/27/2024 1722 by Faith Quinones RN  Outcome: Completed  1/27/2024 1722 by Faith Quinones RN  Outcome: Adequate for Discharge     Problem: Pain  Goal: Verbalizes/displays adequate comfort level or baseline comfort level  1/27/2024 1722 by Faith Quinones RN  Outcome: Completed  1/27/2024 1722 by Faith Quinones RN  Outcome: Adequate for Discharge     Problem: Skin/Tissue Integrity  Goal: Absence of new skin breakdown  Description: 1.  Monitor for areas of redness and/or skin breakdown  2.  Assess vascular access sites hourly  3.  Every 4-6 hours minimum:  Change oxygen saturation probe site  4.  Every 4-6 hours:  If on nasal continuous positive airway pressure, respiratory therapy assess nares and determine need for appliance change or resting period.  1/27/2024 1722 by Faith Quinones RN  Outcome: Completed  1/27/2024 1722 by Faith Quinones RN  Outcome: Adequate for Discharge

## 2024-01-27 NOTE — DISCHARGE INSTRUCTIONS
You were admitted to the hospital for upper GI bleed, you underwent EGD and found to have erosive esophagitis and tortuous distal esophagus and moderate hiatal hernia.     You need to follow up with your GI doctor for further management and investigations    Please take keflex for 3 days for possible UTI    Follow up with your PCP    Take your medications as prescribed    If you begin to experience any symptoms such as chest pain shortness of breath nausea vomiting dizziness drowsiness abdominal pain loss of consciousness or any other symptoms you find concerning please come to the ED for follow-up evaluation.    If you have been given medication please take them as prescribed. Do not take more medication than recommended at any given time.     Please follow-up with your primary care provider within 5 to 7 days for continued care.     Please feel free return to the hospital if your symptoms worsen or any new concerning symptoms develop.  Follow-up with your primary care physician as needed for all other the concerns.

## 2024-01-27 NOTE — PROGRESS NOTES
[facility]  Internal Medicine Teaching Residency Program  Inpatient Daily Progress Note  ______________________________________________________________________________    Patient: Erin Ramirez  YOB: 1945   MRN:[id number]    Acct: [id number]     Room: 0345/0345-02  Admit date: 1/25/2024  Today's date: 01/26/24  Number of days in the hospital: 1    SUBJECTIVE   Admitting Diagnosis: Upper GI bleed  CC: Coffee-ground emesis  Pt examined at bedside. Chart & results reviewed.     -No acute events overnight  -Afebrile.  Hemodynamically stable on room air  -Patient reports generalized body aches.  Denies any further episodes of coffee-ground emesis  -Denies any bowel movements but is able to pass flatus  -N.p.o. for gastric emptying scan and mesenteric artery duplex today  -UOP 0.5/hr    Review of Systems   Constitutional:  Negative for appetite change, chills, diaphoresis and fever.   HENT:  Negative for rhinorrhea and sore throat.    Respiratory:  Negative for cough and shortness of breath.    Cardiovascular:  Negative for chest pain, palpitations and leg swelling.   Gastrointestinal:  Positive for abdominal pain and constipation. Negative for blood in stool, diarrhea, nausea and vomiting.   Genitourinary:  Positive for difficulty urinating. Negative for dysuria.   Musculoskeletal:  Positive for back pain.   Neurological:  Negative for dizziness, light-headedness and headaches.       BRIEF HISTORY     The patient is a pleasant 78 y.o. female presents with a past medical history of hypertension, CKD stage 3a, hyperlipidemia, hiatal hernia, IBS, GERD, hiatal hernia s/p Nissen fundoplication 2/2020, tubular adenoma, history of smoking, history of ESBL, COPD, solitary left kidney, breast cancer on anastrozole, with a chief complaint of coffee-ground emesis.  Patient states yesterday after eating chicken, she begin to feel nauseated and subsequently had multiple 
    [facility]  Internal Medicine Teaching Residency Program  Inpatient Daily Progress Note  ______________________________________________________________________________    Patient: Erin Ramirez  YOB: 1945   MRN:[id number]    Acct: [id number]     Room: 0345/0345-02  Admit date: 1/25/2024  Today's date: 01/27/24  Number of days in the hospital: 2    SUBJECTIVE   Admitting Diagnosis: Upper GI bleed  CC: Coffee-ground emesis  Pt examined at bedside. Chart & results reviewed.     -No acute events overnight  -Afebrile.  Hemodynamically stable on room air  -Patient reports abdominal pain. Was able to tolerate po diet. Will discontinue IV fluids  -Denies any bowel movements but is able to pass flatus  -UOP 1.5/hr. Saldana removed, started on voiding trial   --Gastric emptying scan normal    Review of Systems   Constitutional:  Negative for appetite change, chills, diaphoresis and fever.   HENT:  Negative for rhinorrhea and sore throat.    Respiratory:  Negative for cough and shortness of breath.    Cardiovascular:  Negative for chest pain, palpitations and leg swelling.   Gastrointestinal:  Positive for abdominal pain and constipation. Negative for blood in stool, diarrhea, nausea and vomiting.   Genitourinary:  Negative for difficulty urinating and dysuria.   Musculoskeletal:  Positive for back pain.   Neurological:  Negative for dizziness, light-headedness and headaches.       BRIEF HISTORY     The patient is a pleasant 78 y.o. female presents with a past medical history of hypertension, CKD stage 3a, hyperlipidemia, hiatal hernia, IBS, GERD, hiatal hernia s/p Nissen fundoplication 2/2020, tubular adenoma, history of smoking, history of ESBL, COPD, solitary left kidney, breast cancer on anastrozole, with a chief complaint of coffee-ground emesis.  Patient states yesterday after eating chicken, she begin to feel nauseated and subsequently had multiple bouts of 
Patient stating she takes 150mg Trazodone every night and has for years. Medication list from [facility] double checked but Trazodone not seen. Called [facility] and nurse could not get into patients chart. Resident perfect served regarding above. Nurse from [facility] called back and said patient has not been on Trazodone in some time. Patient informed of above and states she will have her son clarify her meds tomorrow.  
Physical Therapy  Facility/Department: [address] MED SURG  Physical Therapy Initial Assessment    Name: Erin Ramirez  : 1945  MRN: [id number]  Date of Service: 2024    Discharge Recommendations:  Patient would benefit from continued therapy after discharge   PT Equipment Recommendations  Equipment Needed: No      Patient Diagnosis(es): The primary encounter diagnosis was Chronic abdominal pain. Diagnoses of Upper GI bleed, Urinary retention, Esophagitis, Hematemesis, unspecified whether nausea present, and Recurrent abdominal pain were also pertinent to this visit.  Past Medical History:  has a past medical history of A-fib (HCC), Acquired absence of kidney, Adult hypertrophic pyloric stenosis, Agoraphobia with panic disorder, Agoraphobia without history of panic disorder, Allergic rhinitis, Anemia, unspecified, Anxiety, Anxiety disorder, Arthritis, Atrial fibrillation (HCC), Back pain, Bronchitis, Caffeine use, Cardiomegaly, Carpal tunnel syndrome, Cataracts, bilateral, Centriacinar emphysema ([facility]), Chronic kidney disease, stage III (moderate) ([facility]), Chronic pain, Constipation, Constipation, COPD (chronic obstructive pulmonary disease) ([facility]), Cystitis with hematuria, Depression, Diaphragmatic hernia without obstruction or gangrene, Diarrhea, Difficulty walking, Dry eye syndrome of unspecified lacrimal gland, Esophageal obstruction, FOM (frequency of micturition), Foot drop, right foot, Gastro-esophageal reflux disease with esophagitis, with bleeding, GERD (gastroesophageal reflux disease), H/O gastric ulcer, HTN (hypertension), Hyperlipidemia, Hypertension, essential, Klebsiella pneumoniae (k. pneumoniae) as the cause of diseases classified elsewhere, Major depression, recurrent (HCC), Mitral valve prolapse, Mixed hyperlipidemia, Mumps, Muscle weakness (generalized), MVP (mitral valve prolapse), Old myocardial infarction, Personal history of disease of digestive system, Psychophysiological insomnia, 
Writer contacted JUAREZ Shanks at [facility] to give report. Writer answered all questions and concerns. Writer requested physician to send paper scripts to send with pt to facility. Writer informed Rimma RN on potential departure time. [facility]ma RN denied further questions or concerns.   
Writer contacted on-call [facility] Intern per pt request. Pt appears anxious and is tearful and reports \"full body muscle spasms\". Pt reports she is unsure what is causing the spasms. Writer informed on call provider. On luiz provider cam eot bedside and reported to notify primary team for add on medications if needed for anxiety. On call provider reported to notify them if pt has any clinical changes. Writer informed pt. Writer dimmed pts lights in room and provided emotional support and informed pt to notify writer if she feels she needs extra support. Pt denied further questions or concerns.     
tenderness. There is no guarding or rebound.   Musculoskeletal:      Right lower leg: Edema present.      Left lower leg: Edema present.   Skin:     General: Skin is warm.      Capillary Refill: Capillary refill takes less than 2 seconds.      Coloration: Skin is not jaundiced.   Neurological:      General: No focal deficit present.      Mental Status: She is alert and oriented to person, place, and time.   Psychiatric:         Mood and Affect: Mood normal.         Behavior: Behavior normal.     Data Review:    Labs and Imaging:     CBC:  Recent Labs     01/25/24  0402 01/25/24  1641 01/26/24  0457 01/27/24  0616   WBC 11.2  --  5.3 5.3   HGB 11.2* 10.5* 9.2* 10.2*   MCV 88.2  --  95.5 89.6   RDW 14.7*  --  15.3* 15.0*     --  174 179         ANEMIA STUDIES:  No results for input(s): \"TIBC\", \"FERRITIN\", \"[id number]\", \"FOLATE\", \"OCCULTBLD\" in the last 72 hours.    Invalid input(s): \"LABIRON\"    BMP:  Recent Labs     01/25/24  0413 01/26/24  0457 01/27/24  0616    136 137   K 4.3 4.3 4.1    105 106   CO2 23 16* 22   BUN 22 16 13   CREATININE 0.9 1.0* 0.9   GLUCOSE 116* 93 88   CALCIUM 9.3 8.4* 8.6         LFTS:  Recent Labs     01/25/24  0413   ALKPHOS 145*   ALT 8   AST 14   BILITOT 0.2*   LABALBU 3.7         Amylase/Lipase and Ammonia:  Recent Labs     01/25/24  0413   LIPASE 15         Acute Hepatitis Panel:  Lab Results   Component Value Date/Time    HEPBSAG NONREACTIVE 05/16/2020 01:01 PM    HEPCAB NONREACTIVE 05/16/2020 01:01 PM    HEPBIGM NONREACTIVE 05/16/2020 01:01 PM    HEPAIGM NONREACTIVE 05/16/2020 01:01 PM       HCV Genotype:  No components found for: \"HEPATITISCGENOTYPE\"    HCV Quantitative:  No results found for: \"HCVQNT\"    LIVER WORK UP:    AFP  No results found for: \"AFP\"    Alpha 1 antitrypsin   No results found for: \"A1A\"    TOÑO  Lab Results   Component Value Date/Time    TOÑO NEGATIVE 05/16/2020 01:01 PM       AMA  Lab Results   Component Value Date/Time    MITOAB 9.7 05/16/2020 
      PT/INR  No results for input(s): \"PROTIME\", \"INR\" in the last 72 hours.    Cancer Markers:  CEA:  No results for input(s): \"CEA\" in the last 72 hours.  Ca 125:  No results for input(s): \"\" in the last 72 hours.  Ca 19-9:   Invalid input(s): \"\"  AFP: No results for input(s): \"AFP\" in the last 72 hours.  Lactic acid:Invalid input(s): \"LACTIC ACID\"    Radiology Review:    No results found.      Principal Problem:    Upper GI bleed  Active Problems:    COPD (chronic obstructive pulmonary disease) (HCC)    Congenital solitary kidney    Depression    Anxiety    Acute urinary retention    Hiatal hernia    Recurrent UTI    Essential hypertension    History of Nissen fundoplication    Acute blood loss anemia    Malignant neoplasm of left breast in female, estrogen receptor negative (HCC)    Diastolic dysfunction without heart failure  Resolved Problems:    * No resolved hospital problems. *       GI Impression:  Acute upper GI bleed secondary to erosive esophagitis  Erosive and erythematous gastropathy  Tortuous distal esophagus and moderate hiatal hernia      Plan and Recommendations:  -Follow gastric and duodenal biopsies  -PPI twice daily for 2 months, after which repeat EGD outpatient  -Follow-up on gastric duplex and gastric emptying study  -Will need outpatient pH monitoring and manometry      Thank you for allowing me to participate in the care of your patient.  Please feel free to contact me with any questions or concerns.     Please await final attending attestation    [facility]   Levi Hernandez MD   PGY-1  1/26/2024  10:47 AM

## 2024-01-27 NOTE — CARE COORDINATION
Transitional planning note: plan is to return to [facility]. Call placed to facility in attempt to confirm bedhold status as previous  was unable to reach anyone to confirm. CM was transferred to nurse but phone rang endlessly.  at facility states she will have nurse call CM back.     1500: received call back from mary at [facility] stating patient is a long term resident. CM notified patient is ready for return. Mary requests cm fax over ZARA, H&P, progress notes to [phone]. This is done    1654: faxed request for 8pm stretcher  to [facility]. Awaiting confirmation of time.     1657: call placed to patient's son chioma to notify that patient is ready for return to [facility] and that CM is awaiting confirmation of exact  time. Son agreeable to plan.     1800; spoke with gayatri at [facility] who confirmed 1145pm stretcher  to [facility]. Attempted to call [facility] to notify of  time 3 times and phone rang without answer.   
  Plans to Return to Present Housing: Unknown at present (referral sent to [facility].  Awaiting verification for return)  Other Identified Issues/Barriers to RETURNING to current housing: Awaiting cb to verify patient is bed hold  Potential Assistance needed at discharge: Skilled Nursing Facility            Potential DME:    Patient expects to discharge to: Skilled nursing facility  Plan for transportation at discharge:      Financial    Payor: MEDICARE / Plan: MEDICARE PART A AND B / Product Type: *No Product type* /     Does insurance require precert for SNF: No    Potential assistance Purchasing Medications: No  Meds-to-Beds request: Yes      [facility], [place] - [address] -  [phone] - F [phone]  [address] [address]  Phone: [phone] Fax: [phone]      Notes:    Factors facilitating achievement of predicted outcomes: Cooperative    Barriers to discharge: Pain and testing    Additional Case Management Notes: Plan is to return to [facility].  Referral sent.  Need to verify patient is bed hold.  Called [facility].  Unable to reach liaison.  They are currently in \"med pass\".  Left message for cb.    The Plan for Transition of Care is related to the following treatment goals of Urinary retention [R33.9]  Esophagitis [K20.90]  Upper GI bleed [K92.2]  Hematemesis, unspecified whether nausea present [K92.0]    IF APPLICABLE: The Patient and/or patient representative Erin and her family were provided with a choice of provider and agrees with the discharge plan. Freedom of choice list with basic dialogue that supports the patient's individualized plan of care/goals and shares the quality data associated with the providers was provided to:     Patient Representative Name:       The Patient and/or Patient Representative Agree with the Discharge Plan?  yes    Nathalia Tsai RN  Case Management Department  Ph: [phone]

## 2024-01-29 LAB — SURGICAL PATHOLOGY REPORT: NORMAL

## 2024-01-31 NOTE — DISCHARGE SUMMARY
daily, Disp-30 capsule, R-3Normal      metoprolol succinate (TOPROL XL) 25 MG extended release tablet Take 1 tablet by mouth daily Hold for SBP<110, Disp-30 tablet, R-3Normal      gabapentin (NEURONTIN) 300 MG capsule Take 1 capsule by mouth in the morning, at noon, and at bedtime for 5 days., Disp-15 capsule, R-0Print      amitriptyline (ELAVIL) 25 MG tablet Take 1 tablet by mouth 3 times dailyHistorical Med      benztropine (COGENTIN) 0.5 MG tablet Take 1 tablet by mouth 2 times dailyHistorical Med      atorvastatin (LIPITOR) 40 MG tablet Historical Med      !! QUEtiapine (SEROQUEL) 50 MG tablet Take 2 tablets by mouth dailyHistorical Med      pantoprazole (PROTONIX) 40 MG tablet Take 1 tablet by mouth 2 times daily, Disp-60 tablet, R-3Normal      escitalopram (LEXAPRO) 20 MG tablet Take 1 tablet by mouth dailyHistorical Med      ondansetron (ZOFRAN) 4 MG tablet Take 1 tablet by mouth every 8 hours as needed for Nausea or VomitingHistorical Med      albuterol (PROVENTIL) (2.5 MG/3ML) 0.083% nebulizer solution Take 3 mLs by nebulization every 6 hours as needed for WheezingHistorical Med      carboxymethylcellulose 1 % ophthalmic solution Place 1 drop into both eyes every 6 hoursHistorical Med      Lactobacillus TABS Take 1 tablet by mouth 2 times daily Take One tablet by mouth two times dailyHistorical Med      fluticasone (FLONASE) 50 MCG/ACT nasal spray 1 spray by Each Nostril route dailyHistorical Med      loratadine (CLARITIN) 10 MG capsule Take 1 capsule by mouth dailyHistorical Med      polyethylene glycol (GLYCOLAX) 17 g packet Take 17 g by mouth daily as needed for Constipation, Disp-527 g, R-1Normal      Folic Acid-Cholecalciferol 1-2000 MG-UNIT TABS Take by mouthHistorical Med      busPIRone (BUSPAR) 10 MG tablet Take 1 tablet by mouth 2 times daily, R-0DC to SNF       !! - Potential duplicate medications found. Please discuss with provider.          Activity: activity as tolerated    Diet: regular

## 2024-02-01 ENCOUNTER — APPOINTMENT (OUTPATIENT)
Dept: GENERAL RADIOLOGY | Age: 79
DRG: 368 | End: 2024-02-01
Payer: MEDICARE

## 2024-02-01 ENCOUNTER — HOSPITAL ENCOUNTER (INPATIENT)
Age: 79
LOS: 5 days | Discharge: NURSING, REHAB OR LONG-TERM CARE | DRG: 368 | End: 2024-02-06
Attending: EMERGENCY MEDICINE | Admitting: STUDENT IN AN ORGANIZED HEALTH CARE EDUCATION/TRAINING PROGRAM
Payer: MEDICARE

## 2024-02-01 DIAGNOSIS — K29.01 GASTROINTESTINAL HEMORRHAGE ASSOCIATED WITH ACUTE GASTRITIS: ICD-10-CM

## 2024-02-01 DIAGNOSIS — K22.0 ACHALASIA: ICD-10-CM

## 2024-02-01 DIAGNOSIS — J18.9 PNEUMONIA OF LEFT LOWER LOBE DUE TO INFECTIOUS ORGANISM: ICD-10-CM

## 2024-02-01 DIAGNOSIS — K22.4 DIFFUSE ESOPHAGEAL SPASM: ICD-10-CM

## 2024-02-01 DIAGNOSIS — K92.2 UPPER GI BLEED: Primary | ICD-10-CM

## 2024-02-01 LAB
ALBUMIN SERPL-MCNC: 4 G/DL (ref 3.5–5.2)
ALBUMIN/GLOB SERPL: 1.4 {RATIO} (ref 1–2.5)
ALP SERPL-CCNC: 124 U/L (ref 35–104)
ALT SERPL-CCNC: 17 U/L (ref 5–33)
ANION GAP SERPL CALCULATED.3IONS-SCNC: 12 MMOL/L (ref 9–17)
AST SERPL-CCNC: 23 U/L
BASOPHILS # BLD: 0.03 K/UL (ref 0–0.2)
BASOPHILS NFR BLD: 0 % (ref 0–2)
BILIRUB SERPL-MCNC: 0.3 MG/DL (ref 0.3–1.2)
BUN SERPL-MCNC: 22 MG/DL (ref 8–23)
CALCIUM SERPL-MCNC: 9.5 MG/DL (ref 8.6–10.4)
CHLORIDE SERPL-SCNC: 101 MMOL/L (ref 98–107)
CO2 SERPL-SCNC: 26 MMOL/L (ref 20–31)
CREAT SERPL-MCNC: 1 MG/DL (ref 0.5–0.9)
EOSINOPHIL # BLD: 0.06 K/UL (ref 0–0.44)
EOSINOPHILS RELATIVE PERCENT: 1 % (ref 1–4)
ERYTHROCYTE [DISTWIDTH] IN BLOOD BY AUTOMATED COUNT: 15 % (ref 11.8–14.4)
GFR SERPL CREATININE-BSD FRML MDRD: 58 ML/MIN/1.73M2
GLUCOSE SERPL-MCNC: 149 MG/DL (ref 70–99)
HCT VFR BLD AUTO: 35.2 % (ref 36.3–47.1)
HGB BLD-MCNC: 10.9 G/DL (ref 11.9–15.1)
IMM GRANULOCYTES # BLD AUTO: 0.14 K/UL (ref 0–0.3)
IMM GRANULOCYTES NFR BLD: 1 %
INR PPP: 1.2
LACTIC ACID, SEPSIS WHOLE BLOOD: 1.1 MMOL/L (ref 0.5–1.9)
LACTIC ACID, SEPSIS WHOLE BLOOD: 1.9 MMOL/L (ref 0.5–1.9)
LIPASE SERPL-CCNC: 14 U/L (ref 13–60)
LYMPHOCYTES NFR BLD: 1.06 K/UL (ref 1.1–3.7)
LYMPHOCYTES RELATIVE PERCENT: 8 % (ref 24–43)
MCH RBC QN AUTO: 26.8 PG (ref 25.2–33.5)
MCHC RBC AUTO-ENTMCNC: 31 G/DL (ref 28.4–34.8)
MCV RBC AUTO: 86.7 FL (ref 82.6–102.9)
MONOCYTES NFR BLD: 0.76 K/UL (ref 0.1–1.2)
MONOCYTES NFR BLD: 6 % (ref 3–12)
NEUTROPHILS NFR BLD: 84 % (ref 36–65)
NEUTS SEG NFR BLD: 10.52 K/UL (ref 1.5–8.1)
NRBC BLD-RTO: 0 PER 100 WBC
PLATELET # BLD AUTO: 229 K/UL (ref 138–453)
PMV BLD AUTO: 10.7 FL (ref 8.1–13.5)
POTASSIUM SERPL-SCNC: 3.9 MMOL/L (ref 3.7–5.3)
PROT SERPL-MCNC: 6.9 G/DL (ref 6.4–8.3)
PROTHROMBIN TIME: 14.5 SEC (ref 11.7–14.9)
RBC # BLD AUTO: 4.06 M/UL (ref 3.95–5.11)
RBC # BLD: ABNORMAL 10*6/UL
SODIUM SERPL-SCNC: 139 MMOL/L (ref 135–144)
TROPONIN I SERPL HS-MCNC: 21 NG/L (ref 0–14)
TROPONIN I SERPL HS-MCNC: 22 NG/L (ref 0–14)
WBC OTHER # BLD: 12.6 K/UL (ref 3.5–11.3)

## 2024-02-01 PROCEDURE — 71045 X-RAY EXAM CHEST 1 VIEW: CPT

## 2024-02-01 PROCEDURE — 96374 THER/PROPH/DIAG INJ IV PUSH: CPT

## 2024-02-01 PROCEDURE — 2580000003 HC RX 258

## 2024-02-01 PROCEDURE — 80053 COMPREHEN METABOLIC PANEL: CPT

## 2024-02-01 PROCEDURE — 83605 ASSAY OF LACTIC ACID: CPT

## 2024-02-01 PROCEDURE — 93005 ELECTROCARDIOGRAM TRACING: CPT

## 2024-02-01 PROCEDURE — A4216 STERILE WATER/SALINE, 10 ML: HCPCS

## 2024-02-01 PROCEDURE — 99285 EMERGENCY DEPT VISIT HI MDM: CPT

## 2024-02-01 PROCEDURE — C9113 INJ PANTOPRAZOLE SODIUM, VIA: HCPCS

## 2024-02-01 PROCEDURE — 6360000002 HC RX W HCPCS

## 2024-02-01 PROCEDURE — 96375 TX/PRO/DX INJ NEW DRUG ADDON: CPT

## 2024-02-01 PROCEDURE — 6360000002 HC RX W HCPCS: Performed by: EMERGENCY MEDICINE

## 2024-02-01 PROCEDURE — 83690 ASSAY OF LIPASE: CPT

## 2024-02-01 PROCEDURE — 87040 BLOOD CULTURE FOR BACTERIA: CPT

## 2024-02-01 PROCEDURE — 84484 ASSAY OF TROPONIN QUANT: CPT

## 2024-02-01 PROCEDURE — 96376 TX/PRO/DX INJ SAME DRUG ADON: CPT

## 2024-02-01 PROCEDURE — 85610 PROTHROMBIN TIME: CPT

## 2024-02-01 PROCEDURE — 85025 COMPLETE CBC W/AUTO DIFF WBC: CPT

## 2024-02-01 PROCEDURE — 1200000000 HC SEMI PRIVATE

## 2024-02-01 RX ORDER — ONDANSETRON 2 MG/ML
4 INJECTION INTRAMUSCULAR; INTRAVENOUS ONCE
Status: COMPLETED | OUTPATIENT
Start: 2024-02-01 | End: 2024-02-01

## 2024-02-01 RX ORDER — FENTANYL CITRATE 50 UG/ML
50 INJECTION, SOLUTION INTRAMUSCULAR; INTRAVENOUS ONCE
Status: COMPLETED | OUTPATIENT
Start: 2024-02-01 | End: 2024-02-01

## 2024-02-01 RX ADMIN — CEFTRIAXONE SODIUM 1000 MG: 1 INJECTION, POWDER, FOR SOLUTION INTRAMUSCULAR; INTRAVENOUS at 23:18

## 2024-02-01 RX ADMIN — FENTANYL CITRATE 50 MCG: 50 INJECTION, SOLUTION INTRAMUSCULAR; INTRAVENOUS at 19:37

## 2024-02-01 RX ADMIN — ONDANSETRON 4 MG: 2 INJECTION INTRAMUSCULAR; INTRAVENOUS at 21:09

## 2024-02-01 RX ADMIN — PANTOPRAZOLE SODIUM 40 MG: 40 INJECTION, POWDER, FOR SOLUTION INTRAVENOUS at 20:13

## 2024-02-01 RX ADMIN — ONDANSETRON 4 MG: 2 INJECTION INTRAMUSCULAR; INTRAVENOUS at 22:53

## 2024-02-01 RX ADMIN — FENTANYL CITRATE 50 MCG: 50 INJECTION, SOLUTION INTRAMUSCULAR; INTRAVENOUS at 22:32

## 2024-02-01 ASSESSMENT — PAIN DESCRIPTION - LOCATION: LOCATION: ABDOMEN

## 2024-02-01 ASSESSMENT — PAIN SCALES - GENERAL: PAINLEVEL_OUTOF10: 9

## 2024-02-01 ASSESSMENT — PAIN DESCRIPTION - ORIENTATION: ORIENTATION: MID

## 2024-02-01 ASSESSMENT — PAIN - FUNCTIONAL ASSESSMENT: PAIN_FUNCTIONAL_ASSESSMENT: 0-10

## 2024-02-01 ASSESSMENT — PAIN DESCRIPTION - PAIN TYPE: TYPE: ACUTE PAIN

## 2024-02-02 PROBLEM — K29.01 GASTROINTESTINAL HEMORRHAGE ASSOCIATED WITH ACUTE GASTRITIS: Status: ACTIVE | Noted: 2024-02-01

## 2024-02-02 PROBLEM — J18.9 LEFT LOWER LOBE PNEUMONIA: Status: ACTIVE | Noted: 2024-02-02

## 2024-02-02 PROBLEM — J44.9 COPD WITHOUT EXACERBATION (HCC): Chronic | Status: ACTIVE | Noted: 2023-01-02

## 2024-02-02 LAB
ANION GAP SERPL CALCULATED.3IONS-SCNC: 7 MMOL/L (ref 9–17)
BACTERIA URNS QL MICRO: ABNORMAL
BILIRUB UR QL STRIP: NEGATIVE
BUN SERPL-MCNC: 21 MG/DL (ref 8–23)
CALCIUM SERPL-MCNC: 7.9 MG/DL (ref 8.6–10.4)
CASTS #/AREA URNS LPF: ABNORMAL /LPF (ref 0–8)
CHLORIDE SERPL-SCNC: 106 MMOL/L (ref 98–107)
CLARITY UR: ABNORMAL
CO2 SERPL-SCNC: 25 MMOL/L (ref 20–31)
COLOR UR: YELLOW
CREAT SERPL-MCNC: 0.7 MG/DL (ref 0.5–0.9)
EPI CELLS #/AREA URNS HPF: ABNORMAL /HPF (ref 0–5)
GFR SERPL CREATININE-BSD FRML MDRD: >60 ML/MIN/1.73M2
GLUCOSE SERPL-MCNC: 111 MG/DL (ref 70–99)
GLUCOSE UR STRIP-MCNC: NEGATIVE MG/DL
HCT VFR BLD AUTO: 28.6 % (ref 36.3–47.1)
HCT VFR BLD AUTO: 29.4 % (ref 36.3–47.1)
HCT VFR BLD AUTO: 29.9 % (ref 36.3–47.1)
HGB BLD-MCNC: 8.9 G/DL (ref 11.9–15.1)
HGB BLD-MCNC: 9.1 G/DL (ref 11.9–15.1)
HGB BLD-MCNC: 9.2 G/DL (ref 11.9–15.1)
HGB UR QL STRIP.AUTO: NEGATIVE
INR PPP: 1.2
IRON SATN MFR SERPL: 9 % (ref 20–55)
IRON SERPL-MCNC: 26 UG/DL (ref 37–145)
KETONES UR STRIP-MCNC: NEGATIVE MG/DL
LEUKOCYTE ESTERASE UR QL STRIP: ABNORMAL
MAGNESIUM SERPL-MCNC: 1.6 MG/DL (ref 1.6–2.6)
NITRITE UR QL STRIP: NEGATIVE
PARTIAL THROMBOPLASTIN TIME: 29.5 SEC (ref 23–36.5)
PH UR STRIP: 8.5 [PH] (ref 5–8)
POTASSIUM SERPL-SCNC: 3.4 MMOL/L (ref 3.7–5.3)
PROT UR STRIP-MCNC: ABNORMAL MG/DL
PROTHROMBIN TIME: 14.7 SEC (ref 11.7–14.9)
RBC #/AREA URNS HPF: ABNORMAL /HPF (ref 0–4)
SODIUM SERPL-SCNC: 138 MMOL/L (ref 135–144)
SP GR UR STRIP: 1.02 (ref 1–1.03)
TIBC SERPL-MCNC: 304 UG/DL (ref 250–450)
UNSATURATED IRON BINDING CAPACITY: 278 UG/DL (ref 112–347)
UROBILINOGEN UR STRIP-ACNC: NORMAL EU/DL (ref 0–1)
WBC #/AREA URNS HPF: ABNORMAL /HPF (ref 0–5)

## 2024-02-02 PROCEDURE — 2580000003 HC RX 258: Performed by: NURSE PRACTITIONER

## 2024-02-02 PROCEDURE — 6360000002 HC RX W HCPCS: Performed by: NURSE PRACTITIONER

## 2024-02-02 PROCEDURE — 6370000000 HC RX 637 (ALT 250 FOR IP): Performed by: NURSE PRACTITIONER

## 2024-02-02 PROCEDURE — 85730 THROMBOPLASTIN TIME PARTIAL: CPT

## 2024-02-02 PROCEDURE — A4216 STERILE WATER/SALINE, 10 ML: HCPCS | Performed by: NURSE PRACTITIONER

## 2024-02-02 PROCEDURE — C9113 INJ PANTOPRAZOLE SODIUM, VIA: HCPCS | Performed by: NURSE PRACTITIONER

## 2024-02-02 PROCEDURE — 83735 ASSAY OF MAGNESIUM: CPT

## 2024-02-02 PROCEDURE — 99222 1ST HOSP IP/OBS MODERATE 55: CPT | Performed by: INTERNAL MEDICINE

## 2024-02-02 PROCEDURE — 6370000000 HC RX 637 (ALT 250 FOR IP): Performed by: INTERNAL MEDICINE

## 2024-02-02 PROCEDURE — 80048 BASIC METABOLIC PNL TOTAL CA: CPT

## 2024-02-02 PROCEDURE — 85610 PROTHROMBIN TIME: CPT

## 2024-02-02 PROCEDURE — 6360000002 HC RX W HCPCS

## 2024-02-02 PROCEDURE — 6360000002 HC RX W HCPCS: Performed by: INTERNAL MEDICINE

## 2024-02-02 PROCEDURE — 2580000003 HC RX 258

## 2024-02-02 PROCEDURE — 36415 COLL VENOUS BLD VENIPUNCTURE: CPT

## 2024-02-02 PROCEDURE — 1200000000 HC SEMI PRIVATE

## 2024-02-02 PROCEDURE — 83550 IRON BINDING TEST: CPT

## 2024-02-02 PROCEDURE — 87086 URINE CULTURE/COLONY COUNT: CPT

## 2024-02-02 PROCEDURE — 2580000003 HC RX 258: Performed by: INTERNAL MEDICINE

## 2024-02-02 PROCEDURE — 85014 HEMATOCRIT: CPT

## 2024-02-02 PROCEDURE — 83540 ASSAY OF IRON: CPT

## 2024-02-02 PROCEDURE — 81001 URINALYSIS AUTO W/SCOPE: CPT

## 2024-02-02 PROCEDURE — 87077 CULTURE AEROBIC IDENTIFY: CPT

## 2024-02-02 PROCEDURE — 87186 SC STD MICRODIL/AGAR DIL: CPT

## 2024-02-02 PROCEDURE — 85018 HEMOGLOBIN: CPT

## 2024-02-02 RX ORDER — MORPHINE SULFATE 4 MG/ML
2 INJECTION, SOLUTION INTRAMUSCULAR; INTRAVENOUS
Status: DISCONTINUED | OUTPATIENT
Start: 2024-02-02 | End: 2024-02-02

## 2024-02-02 RX ORDER — ONDANSETRON 2 MG/ML
4 INJECTION INTRAMUSCULAR; INTRAVENOUS EVERY 6 HOURS PRN
Status: DISCONTINUED | OUTPATIENT
Start: 2024-02-02 | End: 2024-02-06 | Stop reason: HOSPADM

## 2024-02-02 RX ORDER — POLYETHYLENE GLYCOL 3350 17 G/17G
17 POWDER, FOR SOLUTION ORAL DAILY PRN
Status: DISCONTINUED | OUTPATIENT
Start: 2024-02-02 | End: 2024-02-06 | Stop reason: HOSPADM

## 2024-02-02 RX ORDER — ACETAMINOPHEN 325 MG/1
650 TABLET ORAL EVERY 6 HOURS PRN
Status: DISCONTINUED | OUTPATIENT
Start: 2024-02-02 | End: 2024-02-06 | Stop reason: HOSPADM

## 2024-02-02 RX ORDER — BUSPIRONE HYDROCHLORIDE 5 MG/1
10 TABLET ORAL 2 TIMES DAILY
Status: DISCONTINUED | OUTPATIENT
Start: 2024-02-02 | End: 2024-02-06 | Stop reason: HOSPADM

## 2024-02-02 RX ORDER — SODIUM CHLORIDE 9 MG/ML
INJECTION, SOLUTION INTRAVENOUS PRN
Status: DISCONTINUED | OUTPATIENT
Start: 2024-02-02 | End: 2024-02-06 | Stop reason: HOSPADM

## 2024-02-02 RX ORDER — QUETIAPINE FUMARATE 25 MG/1
50 TABLET, FILM COATED ORAL DAILY
Status: DISCONTINUED | OUTPATIENT
Start: 2024-02-02 | End: 2024-02-06 | Stop reason: HOSPADM

## 2024-02-02 RX ORDER — SODIUM CHLORIDE 9 MG/ML
INJECTION, SOLUTION INTRAVENOUS CONTINUOUS
Status: DISCONTINUED | OUTPATIENT
Start: 2024-02-02 | End: 2024-02-06

## 2024-02-02 RX ORDER — SODIUM CHLORIDE 0.9 % (FLUSH) 0.9 %
5-40 SYRINGE (ML) INJECTION PRN
Status: DISCONTINUED | OUTPATIENT
Start: 2024-02-02 | End: 2024-02-06 | Stop reason: HOSPADM

## 2024-02-02 RX ORDER — LORAZEPAM 2 MG/ML
0.5 INJECTION INTRAMUSCULAR EVERY 6 HOURS PRN
Status: DISCONTINUED | OUTPATIENT
Start: 2024-02-02 | End: 2024-02-06 | Stop reason: HOSPADM

## 2024-02-02 RX ORDER — CLONAZEPAM 0.5 MG/1
0.5 TABLET ORAL 3 TIMES DAILY PRN
Status: DISCONTINUED | OUTPATIENT
Start: 2024-02-02 | End: 2024-02-06

## 2024-02-02 RX ORDER — MORPHINE SULFATE 2 MG/ML
2 INJECTION, SOLUTION INTRAMUSCULAR; INTRAVENOUS
Status: DISCONTINUED | OUTPATIENT
Start: 2024-02-02 | End: 2024-02-04

## 2024-02-02 RX ORDER — ONDANSETRON 4 MG/1
4 TABLET, ORALLY DISINTEGRATING ORAL EVERY 8 HOURS PRN
Status: DISCONTINUED | OUTPATIENT
Start: 2024-02-02 | End: 2024-02-06 | Stop reason: HOSPADM

## 2024-02-02 RX ORDER — METOCLOPRAMIDE HYDROCHLORIDE 5 MG/ML
10 INJECTION INTRAMUSCULAR; INTRAVENOUS ONCE
Status: COMPLETED | OUTPATIENT
Start: 2024-02-02 | End: 2024-02-02

## 2024-02-02 RX ORDER — CHOLECALCIFEROL (VITAMIN D3) 125 MCG
1000 CAPSULE ORAL DAILY
Status: DISCONTINUED | OUTPATIENT
Start: 2024-02-02 | End: 2024-02-06 | Stop reason: HOSPADM

## 2024-02-02 RX ORDER — AMITRIPTYLINE HYDROCHLORIDE 25 MG/1
25 TABLET, FILM COATED ORAL 3 TIMES DAILY
Status: DISCONTINUED | OUTPATIENT
Start: 2024-02-02 | End: 2024-02-06 | Stop reason: HOSPADM

## 2024-02-02 RX ORDER — MORPHINE SULFATE 4 MG/ML
2 INJECTION, SOLUTION INTRAMUSCULAR; INTRAVENOUS
Status: COMPLETED | OUTPATIENT
Start: 2024-02-02 | End: 2024-02-02

## 2024-02-02 RX ORDER — METOPROLOL SUCCINATE 25 MG/1
25 TABLET, EXTENDED RELEASE ORAL DAILY
Status: DISCONTINUED | OUTPATIENT
Start: 2024-02-02 | End: 2024-02-06 | Stop reason: HOSPADM

## 2024-02-02 RX ORDER — ESCITALOPRAM OXALATE 10 MG/1
20 TABLET ORAL DAILY
Status: DISCONTINUED | OUTPATIENT
Start: 2024-02-02 | End: 2024-02-06 | Stop reason: HOSPADM

## 2024-02-02 RX ORDER — LACTOBACILLUS RHAMNOSUS GG 10B CELL
1 CAPSULE ORAL 2 TIMES DAILY
Status: DISCONTINUED | OUTPATIENT
Start: 2024-02-02 | End: 2024-02-06 | Stop reason: HOSPADM

## 2024-02-02 RX ORDER — ACETAMINOPHEN 650 MG/1
650 SUPPOSITORY RECTAL EVERY 6 HOURS PRN
Status: DISCONTINUED | OUTPATIENT
Start: 2024-02-02 | End: 2024-02-06 | Stop reason: HOSPADM

## 2024-02-02 RX ORDER — QUETIAPINE FUMARATE 25 MG/1
100 TABLET, FILM COATED ORAL NIGHTLY
Status: DISCONTINUED | OUTPATIENT
Start: 2024-02-02 | End: 2024-02-06 | Stop reason: HOSPADM

## 2024-02-02 RX ORDER — SODIUM CHLORIDE 0.9 % (FLUSH) 0.9 %
5-40 SYRINGE (ML) INJECTION EVERY 12 HOURS SCHEDULED
Status: DISCONTINUED | OUTPATIENT
Start: 2024-02-02 | End: 2024-02-06 | Stop reason: HOSPADM

## 2024-02-02 RX ORDER — POLYVINYL ALCOHOL 14 MG/ML
1 SOLUTION/ DROPS OPHTHALMIC PRN
Status: DISCONTINUED | OUTPATIENT
Start: 2024-02-02 | End: 2024-02-02

## 2024-02-02 RX ORDER — BENZTROPINE MESYLATE 0.5 MG/1
0.5 TABLET ORAL 2 TIMES DAILY
Status: DISCONTINUED | OUTPATIENT
Start: 2024-02-02 | End: 2024-02-06 | Stop reason: HOSPADM

## 2024-02-02 RX ORDER — TAMSULOSIN HYDROCHLORIDE 0.4 MG/1
0.4 CAPSULE ORAL DAILY
Status: DISCONTINUED | OUTPATIENT
Start: 2024-02-02 | End: 2024-02-06 | Stop reason: HOSPADM

## 2024-02-02 RX ORDER — ALBUTEROL SULFATE 2.5 MG/3ML
2.5 SOLUTION RESPIRATORY (INHALATION) EVERY 6 HOURS PRN
Status: DISCONTINUED | OUTPATIENT
Start: 2024-02-02 | End: 2024-02-06 | Stop reason: HOSPADM

## 2024-02-02 RX ADMIN — MORPHINE SULFATE 2 MG: 4 INJECTION INTRAVENOUS at 03:14

## 2024-02-02 RX ADMIN — MORPHINE SULFATE 2 MG: 4 INJECTION INTRAVENOUS at 15:59

## 2024-02-02 RX ADMIN — BENZTROPINE MESYLATE 0.5 MG: 0.5 TABLET ORAL at 21:36

## 2024-02-02 RX ADMIN — SODIUM CHLORIDE: 9 INJECTION, SOLUTION INTRAVENOUS at 00:33

## 2024-02-02 RX ADMIN — BUSPIRONE HYDROCHLORIDE 10 MG: 5 TABLET ORAL at 21:35

## 2024-02-02 RX ADMIN — LORAZEPAM 0.5 MG: 2 INJECTION INTRAMUSCULAR; INTRAVENOUS at 08:43

## 2024-02-02 RX ADMIN — METOPROLOL SUCCINATE 25 MG: 25 TABLET, EXTENDED RELEASE ORAL at 16:00

## 2024-02-02 RX ADMIN — HYPROMELLOSE 2910 1 DROP: 5 SOLUTION/ DROPS OPHTHALMIC at 16:00

## 2024-02-02 RX ADMIN — SODIUM CHLORIDE, PRESERVATIVE FREE 10 ML: 5 INJECTION INTRAVENOUS at 21:46

## 2024-02-02 RX ADMIN — PANTOPRAZOLE SODIUM 40 MG: 40 INJECTION, POWDER, FOR SOLUTION INTRAVENOUS at 23:14

## 2024-02-02 RX ADMIN — PANTOPRAZOLE SODIUM 40 MG: 40 INJECTION, POWDER, FOR SOLUTION INTRAVENOUS at 13:49

## 2024-02-02 RX ADMIN — QUETIAPINE FUMARATE 100 MG: 25 TABLET ORAL at 21:35

## 2024-02-02 RX ADMIN — VANCOMYCIN HYDROCHLORIDE 1750 MG: 10 INJECTION, POWDER, LYOPHILIZED, FOR SOLUTION INTRAVENOUS at 00:36

## 2024-02-02 RX ADMIN — AMITRIPTYLINE HYDROCHLORIDE 25 MG: 25 TABLET, FILM COATED ORAL at 21:35

## 2024-02-02 RX ADMIN — SODIUM CHLORIDE: 9 INJECTION, SOLUTION INTRAVENOUS at 06:42

## 2024-02-02 RX ADMIN — Medication 1000 MCG: at 16:01

## 2024-02-02 RX ADMIN — MORPHINE SULFATE 2 MG: 2 INJECTION, SOLUTION INTRAMUSCULAR; INTRAVENOUS at 23:12

## 2024-02-02 RX ADMIN — AMITRIPTYLINE HYDROCHLORIDE 25 MG: 25 TABLET, FILM COATED ORAL at 16:00

## 2024-02-02 RX ADMIN — BENZTROPINE MESYLATE 0.5 MG: 0.5 TABLET ORAL at 15:59

## 2024-02-02 RX ADMIN — SODIUM CHLORIDE: 9 INJECTION, SOLUTION INTRAVENOUS at 20:24

## 2024-02-02 RX ADMIN — Medication 1 CAPSULE: at 21:36

## 2024-02-02 RX ADMIN — PANTOPRAZOLE SODIUM 40 MG: 40 INJECTION, POWDER, FOR SOLUTION INTRAVENOUS at 04:14

## 2024-02-02 RX ADMIN — Medication 1 CAPSULE: at 15:59

## 2024-02-02 RX ADMIN — ESCITALOPRAM OXALATE 20 MG: 10 TABLET ORAL at 15:59

## 2024-02-02 RX ADMIN — CLONAZEPAM 0.5 MG: 1 TABLET ORAL at 16:01

## 2024-02-02 RX ADMIN — CEFTRIAXONE SODIUM 1000 MG: 1 INJECTION, POWDER, FOR SOLUTION INTRAMUSCULAR; INTRAVENOUS at 15:33

## 2024-02-02 RX ADMIN — ACETAMINOPHEN 325MG 650 MG: 325 TABLET ORAL at 21:43

## 2024-02-02 RX ADMIN — CLONAZEPAM 0.5 MG: 1 TABLET ORAL at 21:43

## 2024-02-02 RX ADMIN — BUSPIRONE HYDROCHLORIDE 10 MG: 5 TABLET ORAL at 15:58

## 2024-02-02 RX ADMIN — VANCOMYCIN HYDROCHLORIDE 1250 MG: 1.25 INJECTION, POWDER, LYOPHILIZED, FOR SOLUTION INTRAVENOUS at 23:21

## 2024-02-02 RX ADMIN — METOCLOPRAMIDE 10 MG: 5 INJECTION, SOLUTION INTRAMUSCULAR; INTRAVENOUS at 03:15

## 2024-02-02 RX ADMIN — MORPHINE SULFATE 2 MG: 4 INJECTION INTRAVENOUS at 10:52

## 2024-02-02 RX ADMIN — TAMSULOSIN HYDROCHLORIDE 0.4 MG: 0.4 CAPSULE ORAL at 16:01

## 2024-02-02 ASSESSMENT — PAIN SCALES - GENERAL
PAINLEVEL_OUTOF10: 8
PAINLEVEL_OUTOF10: 9
PAINLEVEL_OUTOF10: 8
PAINLEVEL_OUTOF10: 8

## 2024-02-02 ASSESSMENT — PAIN - FUNCTIONAL ASSESSMENT: PAIN_FUNCTIONAL_ASSESSMENT: ACTIVITIES ARE NOT PREVENTED

## 2024-02-02 ASSESSMENT — PAIN DESCRIPTION - LOCATION: LOCATION: ABDOMEN;CHEST

## 2024-02-02 ASSESSMENT — PAIN DESCRIPTION - DESCRIPTORS: DESCRIPTORS: BURNING

## 2024-02-02 NOTE — CONSULTS
Atraumatic  EENT: EOMI, Sclera not icteric, Oropharynx moist  Neck:  Supple, Trachea midline  Lungs:CTA Bilaterally  Heart: RRR, No murmur, No rub, No gallop, PMI nondisplaced.   Abdomen:Soft, Non tender, Not distended, BS WNL,  No masses. No hepatomegalia   Ext:No clubbing.  No cyanosis. No edema.  Skin: No rashes.  No jaundice.  No stigmata of liver disease.    Neuro:  A&O x Three, No focal neurological deficits    Imaging:       Hemotological labs:   Anemia studies:  Recent Labs     02/02/24 0415   TIBC 304       CBC:  Recent Labs     02/01/24 1932 02/02/24 0415   WBC 12.6*  --    HGB 10.9* 9.2*   MCV 86.7  --    RDW 15.0*  --      --        PT/INR:  Recent Labs     02/01/24 1932 02/02/24  0415   PROTIME 14.5 14.7   INR 1.2 1.2       BMP:  Recent Labs     02/01/24 1932 02/02/24  0415    138   K 3.9 3.4*    106   CO2 26 25   BUN 22 21   CREATININE 1.0* 0.7   GLUCOSE 149* 111*   CALCIUM 9.5 7.9*   MG  --  1.6       Liver work up:  Hepatitis Functional Panel:  Recent Labs     02/01/24 1932   ALKPHOS 124*   ALT 17   AST 23   PROT 6.9   BILITOT 0.3   LABALBU 4.0       Amylase/Lipase/Ammonia:  Recent Labs     02/01/24 1932   LIPASE 14       Acute Hepatitis Panel:  Lab Results   Component Value Date/Time    HEPBSAG NONREACTIVE 05/16/2020 01:01 PM    HEPCAB NONREACTIVE 05/16/2020 01:01 PM    HEPBIGM NONREACTIVE 05/16/2020 01:01 PM    HEPAIGM NONREACTIVE 05/16/2020 01:01 PM            Principal Problem:    GI bleed  Resolved Problems:    * No resolved hospital problems. *       GI Impression:  -Hematemesis: Likely due to gastritis as patient has history of erosive gastropathy, grade C reflux esophagitis, s/p Nissen fundoplication 2020.  Hemoglobin trending down.  10.9-9.2.  Emesis settled currently.  Patient hemodynamically stable.   -Chronic nausea and vomiting.  Most likely due to esophagitis and erosive gastropathy  -Abdominal pain.  Mostly the lower lower abdomen.  Could be secondary to

## 2024-02-02 NOTE — ED NOTES
Ania RUIZ contacted regarding patient's continued abdominal pain and n/v. Awaiting further orders.   
Lab contacted about missing CMP result. Lab to call writer back if a new specimen is needed.   
Patient called out for more protonix. Patient notified that another dose won't be available until 0415.   
Patient notified that she will be NPO with ice chips until hospitalist comes down to evaluate her. Unable to give home meds at this time. Pt verbalized understanding.   
Patient placed on bedpan. Dr. Hardy notified of pt's continued n/v.   
Patient to ED 19 via EMS c/o abdominal pain, nausea, vomiting that started this morning. Pt from [facility] who report pt had episode of coffee ground emesis. State recent admission for GI bleed and has only been back to facility for a few days. Patient given 4mg zofran PTA.    On arrival, patient ambulatory, A+OX4, respirations even and unlabored, speaking in complete sentences. PIV established. Pt on cardiac monitor and pulse oximetry. EKG completed.   
Ph[facility]acy [facility] called to send down medications from pharmacy   
Pts facility called and was given an update.   Ary PIZARRO at [facility]   
Son contacted to update that pt has a bed and going up to the floor  
The following labs labeled with pt sticker and tubed to lab:     [] Blue     [] Lavender   [] on ice  [] Green/yellow  [] Green/black [] on ice  [] Yellow  [] Red  [] Pink      [] COVID-19 swab    [] Rapid  [] PCR  [] Flu Swab  [] Strep Swab  [] Peds Viral Panel     [x] Urine Sample  [] Pelvic Cultures  [] Blood Cultures   [] Wound Cultures     
The following labs were labeled with appropriate pt sticker and tubed to lab:     [] Blue     [] Lavender   [] on ice  [] Green/yellow  [x] Green/black [x] on ice  [] Grey  [] on ice  [] Yellow  [] Red  [] Pink  [] Type/ Screen  [] ABG  [] VBG    [] COVID-19 swab    [] Rapid  [] PCR  [] Flu swab  [] Peds Viral Panel     [] Urine Sample  [] Fecal Sample  [] Pelvic Cultures  [x] Blood Cultures  [x] X 2  [] STREP Cultures  [] Wound Cultures   
The following labs were labeled with appropriate pt sticker and tubed to lab:     [] Blue     [] Lavender   [] on ice  [x] Green/yellow  [] Green/black [] on ice  [] Grey  [] on ice  [] Yellow  [] Red  [] Pink  [] Type/ Screen  [] ABG  [] VBG    [] COVID-19 swab    [] Rapid  [] PCR  [] Flu swab  [] Peds Viral Panel     [] Urine Sample  [] Fecal Sample  [] Pelvic Cultures  [] Blood Cultures  [] X 2  [] STREP Cultures  [] Wound Cultures   
The following labs were labeled with appropriate pt sticker and tubed to lab:     [] Blue     [] Lavender   [] on ice  [x] Green/yellow  [x] Green/black [x] on ice  [] Grey  [] on ice  [] Yellow  [] Red  [] Pink  [] Type/ Screen  [] ABG  [] VBG    [] COVID-19 swab    [] Rapid  [] PCR  [] Flu swab  [] Peds Viral Panel     [] Urine Sample  [] Fecal Sample  [] Pelvic Cultures  [] Blood Cultures  [] X 2  [] STREP Cultures  [] Wound Cultures   
The following labs were labeled with appropriate pt sticker and tubed to lab:     [x] Blue     [x] Lavender   [] on ice  [x] Green/yellow  [] Green/black [] on ice  [] Grey  [] on ice  [] Yellow  [] Red  [] Pink  [] Type/ Screen  [] ABG  [] VBG    [] COVID-19 swab    [] Rapid  [] PCR  [] Flu swab  [] Peds Viral Panel     [] Urine Sample  [] Fecal Sample  [] Pelvic Cultures  [] Blood Cultures  [] X 2  [] STREP Cultures  [] Wound Cultures   
The following labs were labeled with appropriate pt sticker and tubed to lab:     [x] Blue     [x] Lavender   [] on ice  [x] Green/yellow  [] Green/black [] on ice  [] Grey  [] on ice  [] Yellow  [] Red  [] Pink  [] Type/ Screen  [] ABG  [] VBG    [] COVID-19 swab    [] Rapid  [] PCR  [] Flu swab  [] Peds Viral Panel     [] Urine Sample  [] Fecal Sample  [] Pelvic Cultures  [] Blood Cultures  [] X 2  [] STREP Cultures  [] Wound Cultures   
Recurrent UTI     Dr. KULWINDER Olivier    S/P PICC central line placement 08/05/2022    \"no longer present\" per Jitendra (Nurse) at [facility].    SBO (small bowel obstruction) ([facility]) 7/22/2023    Sepsis ([facility])     Sinusitis     Tracheostomy in place ([facility])     \"No longer present\" per Jitendra (Nurse) at [facility].    Ulcerative esophagitis     Urinary tract infection, site not specified     Uses wheelchair     Wears glasses     Wellness examination     Dr. Robert-PCP seen in Jan 2020       Labs:  Labs Reviewed   CBC WITH AUTO DIFFERENTIAL - Abnormal; Notable for the following components:       Result Value    WBC 12.6 (*)     Hemoglobin 10.9 (*)     Hematocrit 35.2 (*)     RDW 15.0 (*)     Neutrophils % 84 (*)     Lymphocytes % 8 (*)     Immature Granulocytes 1 (*)     Neutrophils Absolute 10.52 (*)     Lymphocytes Absolute 1.06 (*)     All other components within normal limits   COMPREHENSIVE METABOLIC PANEL - Abnormal; Notable for the following components:    Glucose 149 (*)     Creatinine 1.0 (*)     Est, Glom Filt Rate 58 (*)     Alkaline Phosphatase 124 (*)     All other components within normal limits   URINALYSIS WITH REFLEX TO CULTURE - Abnormal; Notable for the following components:    Turbidity UA Cloudy (*)     pH, UA 8.5 (*)     Protein, UA 1+ (*)     Leukocyte Esterase, Urine SMALL (*)     All other components within normal limits   TROPONIN - Abnormal; Notable for the following components:    Troponin, High Sensitivity 22 (*)     All other components within normal limits   TROPONIN - Abnormal; Notable for the following components:    Troponin, High Sensitivity 21 (*)     All other components within normal limits   MICROSCOPIC URINALYSIS - Abnormal; Notable for the following components:    Bacteria, UA MODERATE (*)     All other components within normal limits   CULTURE, BLOOD 1   CULTURE, BLOOD 1   MRSA DNA PROBE, NASAL   CULTURE, URINE   LIPASE   PROTIME-INR   LACTATE, SEPSIS

## 2024-02-02 NOTE — ED PROVIDER NOTES
Faculty Sign-Out Attestation  Handoff taken on the following patient from prior Attending Physician: Joaquin  Note Started: 11:36 PM EST    I was available and discussed any additional care issues that arose and coordinated the management plans with the resident(s) caring for the patient during my duty period. Any areas of disagreement with resident’s documentation of care or procedures are noted on the chart. I was personally present for the key portions of any/all procedures during my duty period. I have documented in the chart those procedures where I was not present during the key portions.    Vomiting, pneumonia,   >>> admitted    Joseph Garcia DO  Attending Physician       Joseph Garcia DO  02/01/24 [id number]

## 2024-02-02 NOTE — H&P
[facility]  Office: [phone]  Marcelo Quijano DO, Tyler Mari DO, Azam Wong DO, Charles Jensen DO, Carmen Jay MD, Shannen Lewis MD, Moe Dolan MD, Marsha Wiggins MD,  Robinson Figueroa MD, Penelope Alejandro MD, Adry Velazquez MD,  Yajaira Green DO, Mario Summers MD, Camilo Paula MD, Rolando Quijano DO, Heidi Crews MD,  Arthur Hebert DO, Patt Solis MD, Ligia Black MD, Destiny Chan MD, Karen Borja MD,  Familia Campos MD, Payton Canales MD, Carlos Resendez MD, Mare Hugn MD, Howard Vasquez MD, Hever Norris MD, Isak Fritz DO, Mauricio Hall DO, Juan Campa MD,  Colin Giron MD, Shirley Waterhouse, CNP,  Vonda Diaz CNP, Alfred Kuhn, CNP,  Polina Bowman, IVY, Carlyn Soliz, CNP, Nati Gerard, CNP, Izabela Segal CNP, Ania Chandra, CNP, Haley Deras, CNP, Ghazal Delacruz, PA-C, Leigh Artis PA-C, Kathleen Hernandez, CNP, Jayshree Celaya, CNS, Rachel Rodriguez, CNP, Sarah Perez, CNP, Tracy Schwab, CNP         [facility]   IN-PATIENT SERVICE   [facility]    HISTORY AND PHYSICAL EXAMINATION            Date:   2/2/2024  Patient name:  Erin Ramirez  Date of admission:  2/1/2024  7:18 PM  MRN:   [id number]  Account:  [id number]  YOB: 1945  PCP:    No primary care provider on file.  Room:   19/19  Code Status:    Full Code    Chief Complaint:     Chief Complaint   Patient presents with    Emesis    Nausea    Abdominal Pain     Coffee ground emesis, recent GI bleed       History Obtained From:     patient    History of Present Illness:     Erin J Ashley is a 78 y.o. Non- / non  female who presents with Emesis, Nausea, and Abdominal Pain (Coffee ground emesis, recent GI bleed)   and is admitted to the hospital for the management of Gastrointestinal hemorrhage associated with acute gastritis.    This is a 78-year-old female that presents with a complaint of nausea, vomiting and coffee-ground

## 2024-02-02 NOTE — ED PROVIDER NOTES
I performed a history and physical examination of the patient and discussed management with the resident. I reviewed the resident’s note and agree with the documented findings and plan of care. Any areas of disagreement are noted on the chart. I was personally present for the key portions of any procedures. I have documented in the chart those procedures where I was not present during the key portions. I have reviewed the emergency nurses triage note. I agree with the chief complaint, past medical history, past surgical history, allergies, medications, social and family history as documented unless otherwise noted below. Documentation of the HPI, Physical Exam and Medical Decision Making performed by medical students or scribes is based on my personal performance of the HPI, PE and MDM.   For Phys Assistant/ Nurse Practitioner cases/documentation I have personally evaluated this patient and have completed at least one if not all key elements of the E/M (history, physical exam, and MDM). I find the patient's history and physical exam are consistent with the NP/PA documentation. I agree with the care provided, treatment rendered, disposition and followup plan.   Additional findings are as noted.    Rolando Nuñez MD  Attending Emergency  Physician        This patient presented to the emergency department from a local [facility] care facility where she had experienced the onset of abdominal pain with nausea and vomiting and probable hematemesis today.  She reports that abdominal discomfort now extends up into the anterior chest and she describes that discomfort as pressure-like.  She reports some mild shortness of breath that is also associated.  She denies any diarrhea, melena, hematochezia.  She reports an occasional nonproductive cough.  No hemoptysis.  Patient has a significant history of a recent admission with esophagogastroduodenoscopy that was positive for gastritis.  Patient is status post cholecystectomy and

## 2024-02-02 NOTE — ED PROVIDER NOTES
[facility] ED  Emergency Department Encounter  Emergency Medicine Resident     Pt Name:Erin Ramirez  MRN: [id number]  Birthdate 1945  Date of evaluation: 2/1/24  PCP:  No primary care provider on file.  Note Started: 7:21 PM EST      CHIEF COMPLAINT       Chief Complaint   Patient presents with    Emesis    Nausea    Abdominal Pain     Coffee ground emesis, recent GI bleed       HISTORY OF PRESENT ILLNESS  (Location/Symptom, Timing/Onset, Context/Setting, Quality, Duration, Modifying Factors, Severity.)      Erni Ramirez is a 78 y.o. female with history of hiatal hernia status post Niesen in February 2020 and recent GI bleed with EGD on 1/25/2024 that showed erosive/erythematous gastropathy and tortuous distal esophagus who was brought in from her facility by EMS for multiple episodes of emesis and epigastric abdominal pain that started this morning.  Patient describes the emesis as \"beet red\" in color.  She currently is feeling nauseous and complaining of epigastric pain that is radiating into her chest.  Denies any shortness of breath.  Patient denies any black or bloody stools.  She denies any dysuria, hematuria, increased urinary frequency, or urgency.  She was discharged with antibiotics for UTI but patient states that she stopped taking the antibiotics as she did not feel she had a UTI.  She states she was also told by her nurse that she had a fever this morning but does not remember the temperature.    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

## 2024-02-03 PROBLEM — K22.0 ACHALASIA: Status: ACTIVE | Noted: 2024-02-03

## 2024-02-03 LAB
ANION GAP SERPL CALCULATED.3IONS-SCNC: 11 MMOL/L (ref 9–17)
BASOPHILS # BLD: <0.03 K/UL (ref 0–0.2)
BASOPHILS NFR BLD: 0 % (ref 0–2)
BUN SERPL-MCNC: 12 MG/DL (ref 8–23)
CALCIUM SERPL-MCNC: 8.4 MG/DL (ref 8.6–10.4)
CHLORIDE SERPL-SCNC: 108 MMOL/L (ref 98–107)
CO2 SERPL-SCNC: 20 MMOL/L (ref 20–31)
CREAT SERPL-MCNC: 0.7 MG/DL (ref 0.5–0.9)
EOSINOPHIL # BLD: 0.16 K/UL (ref 0–0.44)
EOSINOPHILS RELATIVE PERCENT: 3 % (ref 1–4)
ERYTHROCYTE [DISTWIDTH] IN BLOOD BY AUTOMATED COUNT: 15.4 % (ref 11.8–14.4)
GFR SERPL CREATININE-BSD FRML MDRD: >60 ML/MIN/1.73M2
GLUCOSE SERPL-MCNC: 98 MG/DL (ref 70–99)
HCT VFR BLD AUTO: 27.3 % (ref 36.3–47.1)
HCT VFR BLD AUTO: 27.8 % (ref 36.3–47.1)
HCT VFR BLD AUTO: 28.2 % (ref 36.3–47.1)
HCT VFR BLD AUTO: 30.3 % (ref 36.3–47.1)
HGB BLD-MCNC: 8.3 G/DL (ref 11.9–15.1)
HGB BLD-MCNC: 8.4 G/DL (ref 11.9–15.1)
HGB BLD-MCNC: 8.6 G/DL (ref 11.9–15.1)
HGB BLD-MCNC: 8.8 G/DL (ref 11.9–15.1)
IMM GRANULOCYTES # BLD AUTO: 0.06 K/UL (ref 0–0.3)
IMM GRANULOCYTES NFR BLD: 1 %
LYMPHOCYTES NFR BLD: 0.9 K/UL (ref 1.1–3.7)
LYMPHOCYTES RELATIVE PERCENT: 14 % (ref 24–43)
MCH RBC QN AUTO: 27.7 PG (ref 25.2–33.5)
MCHC RBC AUTO-ENTMCNC: 31.7 G/DL (ref 28.4–34.8)
MCV RBC AUTO: 87.4 FL (ref 82.6–102.9)
MICROORGANISM SPEC CULT: ABNORMAL
MONOCYTES NFR BLD: 0.56 K/UL (ref 0.1–1.2)
MONOCYTES NFR BLD: 9 % (ref 3–12)
NEUTROPHILS NFR BLD: 74 % (ref 36–65)
NEUTS SEG NFR BLD: 4.82 K/UL (ref 1.5–8.1)
NRBC BLD-RTO: 0 PER 100 WBC
PLATELET # BLD AUTO: 201 K/UL (ref 138–453)
PMV BLD AUTO: 10.5 FL (ref 8.1–13.5)
POTASSIUM SERPL-SCNC: 3.9 MMOL/L (ref 3.7–5.3)
RBC # BLD AUTO: 3.18 M/UL (ref 3.95–5.11)
RBC # BLD: ABNORMAL 10*6/UL
SODIUM SERPL-SCNC: 139 MMOL/L (ref 135–144)
SPECIMEN DESCRIPTION: ABNORMAL
WBC OTHER # BLD: 6.5 K/UL (ref 3.5–11.3)

## 2024-02-03 PROCEDURE — 99233 SBSQ HOSP IP/OBS HIGH 50: CPT | Performed by: STUDENT IN AN ORGANIZED HEALTH CARE EDUCATION/TRAINING PROGRAM

## 2024-02-03 PROCEDURE — 6360000002 HC RX W HCPCS: Performed by: NURSE PRACTITIONER

## 2024-02-03 PROCEDURE — 6360000002 HC RX W HCPCS: Performed by: INTERNAL MEDICINE

## 2024-02-03 PROCEDURE — 1200000000 HC SEMI PRIVATE

## 2024-02-03 PROCEDURE — 80048 BASIC METABOLIC PNL TOTAL CA: CPT

## 2024-02-03 PROCEDURE — A4216 STERILE WATER/SALINE, 10 ML: HCPCS | Performed by: NURSE PRACTITIONER

## 2024-02-03 PROCEDURE — 6370000000 HC RX 637 (ALT 250 FOR IP)

## 2024-02-03 PROCEDURE — 85014 HEMATOCRIT: CPT

## 2024-02-03 PROCEDURE — 6370000000 HC RX 637 (ALT 250 FOR IP): Performed by: INTERNAL MEDICINE

## 2024-02-03 PROCEDURE — 2580000003 HC RX 258

## 2024-02-03 PROCEDURE — 6360000002 HC RX W HCPCS

## 2024-02-03 PROCEDURE — C9113 INJ PANTOPRAZOLE SODIUM, VIA: HCPCS | Performed by: NURSE PRACTITIONER

## 2024-02-03 PROCEDURE — 2580000003 HC RX 258: Performed by: NURSE PRACTITIONER

## 2024-02-03 PROCEDURE — 6370000000 HC RX 637 (ALT 250 FOR IP): Performed by: NURSE PRACTITIONER

## 2024-02-03 PROCEDURE — 36415 COLL VENOUS BLD VENIPUNCTURE: CPT

## 2024-02-03 PROCEDURE — 84145 PROCALCITONIN (PCT): CPT

## 2024-02-03 PROCEDURE — 85018 HEMOGLOBIN: CPT

## 2024-02-03 PROCEDURE — 85025 COMPLETE CBC W/AUTO DIFF WBC: CPT

## 2024-02-03 RX ORDER — SUCRALFATE 1 G/1
1 TABLET ORAL
Status: DISCONTINUED | OUTPATIENT
Start: 2024-02-03 | End: 2024-02-06 | Stop reason: HOSPADM

## 2024-02-03 RX ADMIN — AMITRIPTYLINE HYDROCHLORIDE 25 MG: 25 TABLET, FILM COATED ORAL at 08:07

## 2024-02-03 RX ADMIN — SODIUM CHLORIDE, PRESERVATIVE FREE 10 ML: 5 INJECTION INTRAVENOUS at 21:47

## 2024-02-03 RX ADMIN — ACETAMINOPHEN 325MG 650 MG: 325 TABLET ORAL at 17:46

## 2024-02-03 RX ADMIN — BENZTROPINE MESYLATE 0.5 MG: 0.5 TABLET ORAL at 08:07

## 2024-02-03 RX ADMIN — MORPHINE SULFATE 2 MG: 2 INJECTION, SOLUTION INTRAMUSCULAR; INTRAVENOUS at 18:12

## 2024-02-03 RX ADMIN — CLONAZEPAM 0.5 MG: 1 TABLET ORAL at 14:39

## 2024-02-03 RX ADMIN — METOPROLOL SUCCINATE 25 MG: 25 TABLET, EXTENDED RELEASE ORAL at 08:07

## 2024-02-03 RX ADMIN — TAMSULOSIN HYDROCHLORIDE 0.4 MG: 0.4 CAPSULE ORAL at 08:07

## 2024-02-03 RX ADMIN — QUETIAPINE FUMARATE 50 MG: 25 TABLET ORAL at 08:07

## 2024-02-03 RX ADMIN — MORPHINE SULFATE 2 MG: 2 INJECTION, SOLUTION INTRAMUSCULAR; INTRAVENOUS at 15:12

## 2024-02-03 RX ADMIN — BUSPIRONE HYDROCHLORIDE 10 MG: 5 TABLET ORAL at 21:38

## 2024-02-03 RX ADMIN — CLONAZEPAM 0.5 MG: 1 TABLET ORAL at 05:34

## 2024-02-03 RX ADMIN — PANTOPRAZOLE SODIUM 40 MG: 40 INJECTION, POWDER, FOR SOLUTION INTRAVENOUS at 14:39

## 2024-02-03 RX ADMIN — AMITRIPTYLINE HYDROCHLORIDE 25 MG: 25 TABLET, FILM COATED ORAL at 21:38

## 2024-02-03 RX ADMIN — SUCRALFATE 1 G: 1 TABLET ORAL at 21:39

## 2024-02-03 RX ADMIN — AMITRIPTYLINE HYDROCHLORIDE 25 MG: 25 TABLET, FILM COATED ORAL at 14:39

## 2024-02-03 RX ADMIN — MORPHINE SULFATE 2 MG: 2 INJECTION, SOLUTION INTRAMUSCULAR; INTRAVENOUS at 11:53

## 2024-02-03 RX ADMIN — BUSPIRONE HYDROCHLORIDE 10 MG: 5 TABLET ORAL at 08:07

## 2024-02-03 RX ADMIN — SODIUM CHLORIDE: 9 INJECTION, SOLUTION INTRAVENOUS at 18:11

## 2024-02-03 RX ADMIN — Medication 1 CAPSULE: at 21:38

## 2024-02-03 RX ADMIN — BENZTROPINE MESYLATE 0.5 MG: 0.5 TABLET ORAL at 21:38

## 2024-02-03 RX ADMIN — Medication 1 CAPSULE: at 08:08

## 2024-02-03 RX ADMIN — SODIUM CHLORIDE, PRESERVATIVE FREE 10 ML: 5 INJECTION INTRAVENOUS at 08:08

## 2024-02-03 RX ADMIN — VANCOMYCIN HYDROCHLORIDE 1250 MG: 1.25 INJECTION, POWDER, LYOPHILIZED, FOR SOLUTION INTRAVENOUS at 22:51

## 2024-02-03 RX ADMIN — Medication 1000 MCG: at 08:08

## 2024-02-03 RX ADMIN — QUETIAPINE FUMARATE 100 MG: 25 TABLET ORAL at 21:38

## 2024-02-03 RX ADMIN — MORPHINE SULFATE 2 MG: 2 INJECTION, SOLUTION INTRAMUSCULAR; INTRAVENOUS at 08:16

## 2024-02-03 RX ADMIN — ESCITALOPRAM OXALATE 20 MG: 10 TABLET ORAL at 08:07

## 2024-02-03 ASSESSMENT — PAIN SCALES - GENERAL
PAINLEVEL_OUTOF10: 8

## 2024-02-03 ASSESSMENT — PAIN DESCRIPTION - LOCATION
LOCATION: ABDOMEN
LOCATION: ABDOMEN;HEAD
LOCATION: ABDOMEN
LOCATION: HEAD

## 2024-02-03 ASSESSMENT — PAIN DESCRIPTION - DESCRIPTORS
DESCRIPTORS: CRAMPING;SHARP
DESCRIPTORS: ACHING;DISCOMFORT
DESCRIPTORS: SHARP;CRAMPING;DISCOMFORT
DESCRIPTORS: ACHING;DISCOMFORT;CRAMPING

## 2024-02-03 ASSESSMENT — PAIN DESCRIPTION - ORIENTATION
ORIENTATION: ANTERIOR;LOWER
ORIENTATION: ANTERIOR;UPPER
ORIENTATION: ANTERIOR;UPPER;LOWER

## 2024-02-03 NOTE — CARE COORDINATION
02/03/24 0821   Readmission Assessment   Number of Days since last admission? 1-7 days   Previous Disposition SNF   Who is being Interviewed Patient   What was the patient's/caregiver's perception as to why they think they needed to return back to the hospital? Other (Comment)  (reoccurance of bleeding)   Did you visit your Primary Care Physician after you left the hospital, before you returned this time? Yes  (lives long term at [place] follows with MD there)   Did you see a specialist, such as Cardiac, Pulmonary, Orthopedic Physician, etc. after you left the hospital? No   Who advised the patient to return to the hospital? Skilled Unit   Does the patient report anything that got in the way of taking their medications? No   In our efforts to provide the best possible care to you and others like you, can you think of anything that we could have done to help you after you left the hospital the first time, so that you might not have needed to return so soon? Teaching during hospitalization regarding your illness

## 2024-02-03 NOTE — CONSENT
Informed Consent for Blood Component Transfusion Note    I have discussed with the patient the rationale for blood component transfusion; its benefits in treating or preventing fatigue, organ damage, or death; and its risk which includes mild transfusion reactions, rare risk of blood borne infection, or more serious but rare reactions. I have discussed the alternatives to transfusion, including the risk and consequences of not receiving transfusion. The patient had an opportunity to ask questions and had agreed to proceed with transfusion of blood components.    Electronically signed by Azam Wong DO on 2/3/24 at 6:35 AM EST

## 2024-02-03 NOTE — CARE COORDINATION
Case Management Assessment  Initial Evaluation    Date/Time of Evaluation: 2/3/2024 8:20 AM  Assessment Completed by: JUAN PABLO MONTERO RN    If patient is discharged prior to next notation, then this note serves as note for discharge by case management.    Patient Name: Erin Ramirez                   YOB: 1945  Diagnosis: GI bleed [K92.2]  Upper GI bleed [K92.2]  Pneumonia of left lower lobe due to infectious organism [J18.9]                   Date / Time: 2/1/2024  7:18 PM    Patient Admission Status: Inpatient   Readmission Risk (Low < 19, Mod (19-27), High > 27): Readmission Risk Score: 29.3    Current PCP: No primary care provider on file.  PCP verified by CM? (P)  (follows with MD at [place])    Chart Reviewed: Yes      History Provided by: (P) Patient  Patient Orientation: (P) Alert and Oriented    Patient Cognition: (P) Alert    Hospitalization in the last 30 days (Readmission):  Yes    If yes, Readmission Assessment in CM Navigator will be completed.    Advance Directives:      Code Status: Full Code   Patient's Primary Decision Maker is:      Primary Decision Maker: Hamilton Ramirez - Child - [phone]    Discharge Planning:    Patient lives with: Other (Comment) (SNF) Type of Home: (P) Skilled Nursing Facility  Primary Care Giver: (P) Other (Comment) (from [facility])  Patient Support Systems include: (P) Children   Current Financial resources: (P) Medicaid, Medicare  Current community resources:    Current services prior to admission: (P) Skilled Nursing Facility            Current DME:              Type of Home Care services:  (P) None    ADLS  Prior functional level: (P) Assistance with the following:  Current functional level: (P) Assistance with the following:    PT AM-PAC:   /24  OT AM-PAC:   /24    Family can provide assistance at DC: (P) No  Would you like Case Management to discuss the discharge plan with any other family members/significant others, and if

## 2024-02-04 LAB
EKG ATRIAL RATE: 95 BPM
EKG P AXIS: 36 DEGREES
EKG P-R INTERVAL: 164 MS
EKG Q-T INTERVAL: 364 MS
EKG QRS DURATION: 70 MS
EKG QTC CALCULATION (BAZETT): 457 MS
EKG R AXIS: 48 DEGREES
EKG T AXIS: 13 DEGREES
EKG VENTRICULAR RATE: 95 BPM
HCT VFR BLD AUTO: 25.4 % (ref 36.3–47.1)
HCT VFR BLD AUTO: 26.7 % (ref 36.3–47.1)
HCT VFR BLD AUTO: 27 % (ref 36.3–47.1)
HCT VFR BLD AUTO: 27.5 % (ref 36.3–47.1)
HGB BLD-MCNC: 7.8 G/DL (ref 11.9–15.1)
HGB BLD-MCNC: 8.1 G/DL (ref 11.9–15.1)
HGB BLD-MCNC: 8.2 G/DL (ref 11.9–15.1)
HGB BLD-MCNC: 8.2 G/DL (ref 11.9–15.1)
PROCALCITONIN SERPL-MCNC: 0.15 NG/ML
VANCOMYCIN SERPL-MCNC: 20.9 UG/ML

## 2024-02-04 PROCEDURE — 6360000002 HC RX W HCPCS: Performed by: INTERNAL MEDICINE

## 2024-02-04 PROCEDURE — C9113 INJ PANTOPRAZOLE SODIUM, VIA: HCPCS | Performed by: NURSE PRACTITIONER

## 2024-02-04 PROCEDURE — 6370000000 HC RX 637 (ALT 250 FOR IP)

## 2024-02-04 PROCEDURE — 1200000000 HC SEMI PRIVATE

## 2024-02-04 PROCEDURE — 85014 HEMATOCRIT: CPT

## 2024-02-04 PROCEDURE — 80202 ASSAY OF VANCOMYCIN: CPT

## 2024-02-04 PROCEDURE — 6370000000 HC RX 637 (ALT 250 FOR IP): Performed by: INTERNAL MEDICINE

## 2024-02-04 PROCEDURE — 36415 COLL VENOUS BLD VENIPUNCTURE: CPT

## 2024-02-04 PROCEDURE — 6360000002 HC RX W HCPCS: Performed by: STUDENT IN AN ORGANIZED HEALTH CARE EDUCATION/TRAINING PROGRAM

## 2024-02-04 PROCEDURE — 6370000000 HC RX 637 (ALT 250 FOR IP): Performed by: STUDENT IN AN ORGANIZED HEALTH CARE EDUCATION/TRAINING PROGRAM

## 2024-02-04 PROCEDURE — 6360000002 HC RX W HCPCS: Performed by: NURSE PRACTITIONER

## 2024-02-04 PROCEDURE — 2580000003 HC RX 258: Performed by: NURSE PRACTITIONER

## 2024-02-04 PROCEDURE — 2580000003 HC RX 258

## 2024-02-04 PROCEDURE — 6360000002 HC RX W HCPCS

## 2024-02-04 PROCEDURE — 99233 SBSQ HOSP IP/OBS HIGH 50: CPT | Performed by: STUDENT IN AN ORGANIZED HEALTH CARE EDUCATION/TRAINING PROGRAM

## 2024-02-04 PROCEDURE — A4216 STERILE WATER/SALINE, 10 ML: HCPCS | Performed by: NURSE PRACTITIONER

## 2024-02-04 PROCEDURE — 85018 HEMOGLOBIN: CPT

## 2024-02-04 RX ORDER — MORPHINE SULFATE 2 MG/ML
2 INJECTION, SOLUTION INTRAMUSCULAR; INTRAVENOUS
Status: DISCONTINUED | OUTPATIENT
Start: 2024-02-04 | End: 2024-02-06

## 2024-02-04 RX ORDER — GUAIFENESIN 600 MG/1
600 TABLET, EXTENDED RELEASE ORAL 2 TIMES DAILY
Status: DISCONTINUED | OUTPATIENT
Start: 2024-02-04 | End: 2024-02-06 | Stop reason: HOSPADM

## 2024-02-04 RX ORDER — GUAIFENESIN/DEXTROMETHORPHAN 100-10MG/5
5 SYRUP ORAL EVERY 4 HOURS PRN
Status: DISCONTINUED | OUTPATIENT
Start: 2024-02-04 | End: 2024-02-06 | Stop reason: HOSPADM

## 2024-02-04 RX ADMIN — TAMSULOSIN HYDROCHLORIDE 0.4 MG: 0.4 CAPSULE ORAL at 08:19

## 2024-02-04 RX ADMIN — MORPHINE SULFATE 2 MG: 2 INJECTION, SOLUTION INTRAMUSCULAR; INTRAVENOUS at 20:34

## 2024-02-04 RX ADMIN — SUCRALFATE 1 G: 1 TABLET ORAL at 20:36

## 2024-02-04 RX ADMIN — SUCRALFATE 1 G: 1 TABLET ORAL at 12:26

## 2024-02-04 RX ADMIN — VANCOMYCIN HYDROCHLORIDE 1500 MG: 1.5 INJECTION, POWDER, LYOPHILIZED, FOR SOLUTION INTRAVENOUS at 23:36

## 2024-02-04 RX ADMIN — SUCRALFATE 1 G: 1 TABLET ORAL at 06:49

## 2024-02-04 RX ADMIN — AMITRIPTYLINE HYDROCHLORIDE 25 MG: 25 TABLET, FILM COATED ORAL at 20:37

## 2024-02-04 RX ADMIN — QUETIAPINE FUMARATE 100 MG: 25 TABLET ORAL at 20:37

## 2024-02-04 RX ADMIN — CLONAZEPAM 0.5 MG: 1 TABLET ORAL at 18:07

## 2024-02-04 RX ADMIN — ESCITALOPRAM OXALATE 20 MG: 10 TABLET ORAL at 08:19

## 2024-02-04 RX ADMIN — BUSPIRONE HYDROCHLORIDE 10 MG: 5 TABLET ORAL at 20:38

## 2024-02-04 RX ADMIN — Medication 1 CAPSULE: at 20:38

## 2024-02-04 RX ADMIN — BENZTROPINE MESYLATE 0.5 MG: 0.5 TABLET ORAL at 20:37

## 2024-02-04 RX ADMIN — BUSPIRONE HYDROCHLORIDE 10 MG: 5 TABLET ORAL at 08:19

## 2024-02-04 RX ADMIN — PANTOPRAZOLE SODIUM 40 MG: 40 INJECTION, POWDER, FOR SOLUTION INTRAVENOUS at 03:25

## 2024-02-04 RX ADMIN — SODIUM CHLORIDE: 9 INJECTION, SOLUTION INTRAVENOUS at 16:44

## 2024-02-04 RX ADMIN — MORPHINE SULFATE 2 MG: 2 INJECTION, SOLUTION INTRAMUSCULAR; INTRAVENOUS at 12:27

## 2024-02-04 RX ADMIN — Medication 1 CAPSULE: at 08:19

## 2024-02-04 RX ADMIN — SODIUM CHLORIDE: 9 INJECTION, SOLUTION INTRAVENOUS at 10:09

## 2024-02-04 RX ADMIN — QUETIAPINE FUMARATE 50 MG: 25 TABLET ORAL at 08:19

## 2024-02-04 RX ADMIN — SUCRALFATE 1 G: 1 TABLET ORAL at 18:07

## 2024-02-04 RX ADMIN — MORPHINE SULFATE 2 MG: 2 INJECTION, SOLUTION INTRAMUSCULAR; INTRAVENOUS at 08:33

## 2024-02-04 RX ADMIN — MORPHINE SULFATE 2 MG: 2 INJECTION, SOLUTION INTRAMUSCULAR; INTRAVENOUS at 04:45

## 2024-02-04 RX ADMIN — AMITRIPTYLINE HYDROCHLORIDE 25 MG: 25 TABLET, FILM COATED ORAL at 08:19

## 2024-02-04 RX ADMIN — SODIUM CHLORIDE, PRESERVATIVE FREE 10 ML: 5 INJECTION INTRAVENOUS at 08:19

## 2024-02-04 RX ADMIN — AMITRIPTYLINE HYDROCHLORIDE 25 MG: 25 TABLET, FILM COATED ORAL at 14:50

## 2024-02-04 RX ADMIN — MORPHINE SULFATE 2 MG: 2 INJECTION, SOLUTION INTRAMUSCULAR; INTRAVENOUS at 15:41

## 2024-02-04 RX ADMIN — GUAIFENESIN 600 MG: 600 TABLET, EXTENDED RELEASE ORAL at 20:37

## 2024-02-04 RX ADMIN — CLONAZEPAM 0.5 MG: 1 TABLET ORAL at 10:08

## 2024-02-04 RX ADMIN — PANTOPRAZOLE SODIUM 40 MG: 40 INJECTION, POWDER, FOR SOLUTION INTRAVENOUS at 14:50

## 2024-02-04 RX ADMIN — CLONAZEPAM 0.5 MG: 1 TABLET ORAL at 04:26

## 2024-02-04 RX ADMIN — Medication 1000 MCG: at 08:19

## 2024-02-04 RX ADMIN — METOPROLOL SUCCINATE 25 MG: 25 TABLET, EXTENDED RELEASE ORAL at 08:19

## 2024-02-04 RX ADMIN — BENZTROPINE MESYLATE 0.5 MG: 0.5 TABLET ORAL at 08:19

## 2024-02-04 ASSESSMENT — PAIN DESCRIPTION - DESCRIPTORS
DESCRIPTORS: TENDER;SHARP
DESCRIPTORS: ACHING
DESCRIPTORS: SHARP
DESCRIPTORS: SHARP;SPASM

## 2024-02-04 ASSESSMENT — PAIN DESCRIPTION - LOCATION
LOCATION: ABDOMEN
LOCATION: ABDOMEN;GENERALIZED
LOCATION: ABDOMEN

## 2024-02-04 ASSESSMENT — PAIN SCALES - GENERAL
PAINLEVEL_OUTOF10: 8
PAINLEVEL_OUTOF10: 7
PAINLEVEL_OUTOF10: 8
PAINLEVEL_OUTOF10: 7
PAINLEVEL_OUTOF10: 7

## 2024-02-04 ASSESSMENT — PAIN DESCRIPTION - ORIENTATION
ORIENTATION: LOWER
ORIENTATION: LOWER

## 2024-02-04 ASSESSMENT — PAIN - FUNCTIONAL ASSESSMENT: PAIN_FUNCTIONAL_ASSESSMENT: PREVENTS OR INTERFERES SOME ACTIVE ACTIVITIES AND ADLS

## 2024-02-05 ENCOUNTER — ANESTHESIA (OUTPATIENT)
Dept: OPERATING ROOM | Age: 79
DRG: 368 | End: 2024-02-05
Payer: MEDICARE

## 2024-02-05 ENCOUNTER — ANESTHESIA EVENT (OUTPATIENT)
Dept: OPERATING ROOM | Age: 79
DRG: 368 | End: 2024-02-05
Payer: MEDICARE

## 2024-02-05 LAB
HCT VFR BLD AUTO: 27.4 % (ref 36.3–47.1)
HGB BLD-MCNC: 8.2 G/DL (ref 11.9–15.1)

## 2024-02-05 PROCEDURE — 6360000002 HC RX W HCPCS: Performed by: INTERNAL MEDICINE

## 2024-02-05 PROCEDURE — 85018 HEMOGLOBIN: CPT

## 2024-02-05 PROCEDURE — 6370000000 HC RX 637 (ALT 250 FOR IP)

## 2024-02-05 PROCEDURE — 2580000003 HC RX 258: Performed by: ANESTHESIOLOGY

## 2024-02-05 PROCEDURE — 6360000002 HC RX W HCPCS: Performed by: STUDENT IN AN ORGANIZED HEALTH CARE EDUCATION/TRAINING PROGRAM

## 2024-02-05 PROCEDURE — 7100000001 HC PACU RECOVERY - ADDTL 15 MIN: Performed by: INTERNAL MEDICINE

## 2024-02-05 PROCEDURE — 6370000000 HC RX 637 (ALT 250 FOR IP): Performed by: INTERNAL MEDICINE

## 2024-02-05 PROCEDURE — 2580000003 HC RX 258: Performed by: INTERNAL MEDICINE

## 2024-02-05 PROCEDURE — 85014 HEMATOCRIT: CPT

## 2024-02-05 PROCEDURE — 99233 SBSQ HOSP IP/OBS HIGH 50: CPT | Performed by: STUDENT IN AN ORGANIZED HEALTH CARE EDUCATION/TRAINING PROGRAM

## 2024-02-05 PROCEDURE — 3E0G8GC INTRODUCTION OF OTHER THERAPEUTIC SUBSTANCE INTO UPPER GI, VIA NATURAL OR ARTIFICIAL OPENING ENDOSCOPIC: ICD-10-PCS | Performed by: INTERNAL MEDICINE

## 2024-02-05 PROCEDURE — 2580000003 HC RX 258: Performed by: NURSE PRACTITIONER

## 2024-02-05 PROCEDURE — C9113 INJ PANTOPRAZOLE SODIUM, VIA: HCPCS | Performed by: INTERNAL MEDICINE

## 2024-02-05 PROCEDURE — 3700000000 HC ANESTHESIA ATTENDED CARE: Performed by: INTERNAL MEDICINE

## 2024-02-05 PROCEDURE — 2500000003 HC RX 250 WO HCPCS

## 2024-02-05 PROCEDURE — 6360000002 HC RX W HCPCS

## 2024-02-05 PROCEDURE — 3700000001 HC ADD 15 MINUTES (ANESTHESIA): Performed by: INTERNAL MEDICINE

## 2024-02-05 PROCEDURE — 2709999900 HC NON-CHARGEABLE SUPPLY: Performed by: INTERNAL MEDICINE

## 2024-02-05 PROCEDURE — 1200000000 HC SEMI PRIVATE

## 2024-02-05 PROCEDURE — A4216 STERILE WATER/SALINE, 10 ML: HCPCS | Performed by: INTERNAL MEDICINE

## 2024-02-05 PROCEDURE — A4216 STERILE WATER/SALINE, 10 ML: HCPCS | Performed by: NURSE PRACTITIONER

## 2024-02-05 PROCEDURE — 3609013800 HC EGD SUBMUCOSAL/BOTOX INJECTION: Performed by: INTERNAL MEDICINE

## 2024-02-05 PROCEDURE — 6360000002 HC RX W HCPCS: Performed by: NURSE PRACTITIONER

## 2024-02-05 PROCEDURE — C9113 INJ PANTOPRAZOLE SODIUM, VIA: HCPCS | Performed by: NURSE PRACTITIONER

## 2024-02-05 PROCEDURE — 7100000000 HC PACU RECOVERY - FIRST 15 MIN: Performed by: INTERNAL MEDICINE

## 2024-02-05 PROCEDURE — 36415 COLL VENOUS BLD VENIPUNCTURE: CPT

## 2024-02-05 PROCEDURE — 2580000003 HC RX 258

## 2024-02-05 RX ORDER — DIPHENHYDRAMINE HYDROCHLORIDE 50 MG/ML
12.5 INJECTION INTRAMUSCULAR; INTRAVENOUS
Status: DISCONTINUED | OUTPATIENT
Start: 2024-02-05 | End: 2024-02-05 | Stop reason: HOSPADM

## 2024-02-05 RX ORDER — LIDOCAINE HYDROCHLORIDE 10 MG/ML
INJECTION, SOLUTION INFILTRATION; PERINEURAL PRN
Status: DISCONTINUED | OUTPATIENT
Start: 2024-02-05 | End: 2024-02-05 | Stop reason: SDUPTHER

## 2024-02-05 RX ORDER — FENTANYL CITRATE 50 UG/ML
25 INJECTION, SOLUTION INTRAMUSCULAR; INTRAVENOUS EVERY 5 MIN PRN
Status: DISCONTINUED | OUTPATIENT
Start: 2024-02-05 | End: 2024-02-05 | Stop reason: HOSPADM

## 2024-02-05 RX ORDER — SODIUM CHLORIDE 0.9 % (FLUSH) 0.9 %
5-40 SYRINGE (ML) INJECTION EVERY 12 HOURS SCHEDULED
Status: DISCONTINUED | OUTPATIENT
Start: 2024-02-05 | End: 2024-02-05 | Stop reason: HOSPADM

## 2024-02-05 RX ORDER — LIDOCAINE HYDROCHLORIDE 10 MG/ML
1 INJECTION, SOLUTION EPIDURAL; INFILTRATION; INTRACAUDAL; PERINEURAL
Status: DISCONTINUED | OUTPATIENT
Start: 2024-02-05 | End: 2024-02-05 | Stop reason: HOSPADM

## 2024-02-05 RX ORDER — FENTANYL CITRATE 50 UG/ML
25 INJECTION, SOLUTION INTRAMUSCULAR; INTRAVENOUS
Status: DISCONTINUED | OUTPATIENT
Start: 2024-02-05 | End: 2024-02-05 | Stop reason: HOSPADM

## 2024-02-05 RX ORDER — ALBUTEROL SULFATE 2.5 MG/3ML
2.5 SOLUTION RESPIRATORY (INHALATION) EVERY 8 HOURS PRN
Status: DISCONTINUED | OUTPATIENT
Start: 2024-02-05 | End: 2024-02-05 | Stop reason: HOSPADM

## 2024-02-05 RX ORDER — MIDAZOLAM HYDROCHLORIDE 2 MG/2ML
1 INJECTION, SOLUTION INTRAMUSCULAR; INTRAVENOUS EVERY 10 MIN PRN
Status: DISCONTINUED | OUTPATIENT
Start: 2024-02-05 | End: 2024-02-05 | Stop reason: HOSPADM

## 2024-02-05 RX ORDER — FENTANYL CITRATE 50 UG/ML
50 INJECTION, SOLUTION INTRAMUSCULAR; INTRAVENOUS EVERY 5 MIN PRN
Status: DISCONTINUED | OUTPATIENT
Start: 2024-02-05 | End: 2024-02-05 | Stop reason: HOSPADM

## 2024-02-05 RX ORDER — SODIUM CHLORIDE 9 MG/ML
INJECTION, SOLUTION INTRAVENOUS PRN
Status: DISCONTINUED | OUTPATIENT
Start: 2024-02-05 | End: 2024-02-05 | Stop reason: HOSPADM

## 2024-02-05 RX ORDER — FENTANYL CITRATE 50 UG/ML
50 INJECTION, SOLUTION INTRAMUSCULAR; INTRAVENOUS
Status: DISCONTINUED | OUTPATIENT
Start: 2024-02-05 | End: 2024-02-05 | Stop reason: HOSPADM

## 2024-02-05 RX ORDER — SODIUM CHLORIDE, SODIUM LACTATE, POTASSIUM CHLORIDE, CALCIUM CHLORIDE 600; 310; 30; 20 MG/100ML; MG/100ML; MG/100ML; MG/100ML
INJECTION, SOLUTION INTRAVENOUS CONTINUOUS PRN
Status: DISCONTINUED | OUTPATIENT
Start: 2024-02-05 | End: 2024-02-05 | Stop reason: SDUPTHER

## 2024-02-05 RX ORDER — ONDANSETRON 2 MG/ML
4 INJECTION INTRAMUSCULAR; INTRAVENOUS
Status: DISCONTINUED | OUTPATIENT
Start: 2024-02-05 | End: 2024-02-05 | Stop reason: HOSPADM

## 2024-02-05 RX ORDER — SODIUM CHLORIDE, SODIUM LACTATE, POTASSIUM CHLORIDE, CALCIUM CHLORIDE 600; 310; 30; 20 MG/100ML; MG/100ML; MG/100ML; MG/100ML
INJECTION, SOLUTION INTRAVENOUS CONTINUOUS
Status: DISCONTINUED | OUTPATIENT
Start: 2024-02-05 | End: 2024-02-05 | Stop reason: HOSPADM

## 2024-02-05 RX ORDER — PROPOFOL 10 MG/ML
INJECTION, EMULSION INTRAVENOUS CONTINUOUS PRN
Status: DISCONTINUED | OUTPATIENT
Start: 2024-02-05 | End: 2024-02-05 | Stop reason: SDUPTHER

## 2024-02-05 RX ORDER — SODIUM CHLORIDE 0.9 % (FLUSH) 0.9 %
5-40 SYRINGE (ML) INJECTION PRN
Status: DISCONTINUED | OUTPATIENT
Start: 2024-02-05 | End: 2024-02-05 | Stop reason: HOSPADM

## 2024-02-05 RX ADMIN — MORPHINE SULFATE 2 MG: 2 INJECTION, SOLUTION INTRAMUSCULAR; INTRAVENOUS at 20:48

## 2024-02-05 RX ADMIN — BUSPIRONE HYDROCHLORIDE 10 MG: 5 TABLET ORAL at 20:53

## 2024-02-05 RX ADMIN — BUSPIRONE HYDROCHLORIDE 10 MG: 5 TABLET ORAL at 12:01

## 2024-02-05 RX ADMIN — POLYETHYLENE GLYCOL 3350 17 G: 17 POWDER, FOR SOLUTION ORAL at 11:59

## 2024-02-05 RX ADMIN — METOPROLOL SUCCINATE 25 MG: 25 TABLET, EXTENDED RELEASE ORAL at 12:00

## 2024-02-05 RX ADMIN — MORPHINE SULFATE 2 MG: 2 INJECTION, SOLUTION INTRAMUSCULAR; INTRAVENOUS at 11:59

## 2024-02-05 RX ADMIN — SODIUM CHLORIDE: 9 INJECTION, SOLUTION INTRAVENOUS at 06:43

## 2024-02-05 RX ADMIN — AMITRIPTYLINE HYDROCHLORIDE 25 MG: 25 TABLET, FILM COATED ORAL at 20:51

## 2024-02-05 RX ADMIN — Medication 1000 MCG: at 12:00

## 2024-02-05 RX ADMIN — MORPHINE SULFATE 2 MG: 2 INJECTION, SOLUTION INTRAMUSCULAR; INTRAVENOUS at 04:06

## 2024-02-05 RX ADMIN — PANTOPRAZOLE SODIUM 40 MG: 40 INJECTION, POWDER, FOR SOLUTION INTRAVENOUS at 01:22

## 2024-02-05 RX ADMIN — SUCRALFATE 1 G: 1 TABLET ORAL at 17:06

## 2024-02-05 RX ADMIN — CLONAZEPAM 0.5 MG: 1 TABLET ORAL at 05:52

## 2024-02-05 RX ADMIN — MORPHINE SULFATE 2 MG: 2 INJECTION, SOLUTION INTRAMUSCULAR; INTRAVENOUS at 17:11

## 2024-02-05 RX ADMIN — QUETIAPINE FUMARATE 100 MG: 25 TABLET ORAL at 20:52

## 2024-02-05 RX ADMIN — CLONAZEPAM 0.5 MG: 1 TABLET ORAL at 13:44

## 2024-02-05 RX ADMIN — LIDOCAINE HYDROCHLORIDE 50 MG: 10 INJECTION, SOLUTION INFILTRATION; PERINEURAL at 09:53

## 2024-02-05 RX ADMIN — MORPHINE SULFATE 2 MG: 2 INJECTION, SOLUTION INTRAMUSCULAR; INTRAVENOUS at 14:42

## 2024-02-05 RX ADMIN — GUAIFENESIN 600 MG: 600 TABLET, EXTENDED RELEASE ORAL at 12:00

## 2024-02-05 RX ADMIN — PANTOPRAZOLE SODIUM 40 MG: 40 INJECTION, POWDER, FOR SOLUTION INTRAVENOUS at 14:06

## 2024-02-05 RX ADMIN — BENZTROPINE MESYLATE 0.5 MG: 0.5 TABLET ORAL at 20:52

## 2024-02-05 RX ADMIN — Medication 1 CAPSULE: at 12:01

## 2024-02-05 RX ADMIN — SODIUM CHLORIDE, POTASSIUM CHLORIDE, SODIUM LACTATE AND CALCIUM CHLORIDE: 600; 310; 30; 20 INJECTION, SOLUTION INTRAVENOUS at 07:00

## 2024-02-05 RX ADMIN — AMITRIPTYLINE HYDROCHLORIDE 25 MG: 25 TABLET, FILM COATED ORAL at 12:02

## 2024-02-05 RX ADMIN — VANCOMYCIN HYDROCHLORIDE 1500 MG: 1.5 INJECTION, POWDER, LYOPHILIZED, FOR SOLUTION INTRAVENOUS at 22:42

## 2024-02-05 RX ADMIN — SUCRALFATE 1 G: 1 TABLET ORAL at 12:01

## 2024-02-05 RX ADMIN — PROPOFOL 100 MCG/KG/MIN: 10 INJECTION, EMULSION INTRAVENOUS at 09:53

## 2024-02-05 RX ADMIN — GUAIFENESIN 600 MG: 600 TABLET, EXTENDED RELEASE ORAL at 20:51

## 2024-02-05 RX ADMIN — SODIUM CHLORIDE, POTASSIUM CHLORIDE, SODIUM LACTATE AND CALCIUM CHLORIDE: 600; 310; 30; 20 INJECTION, SOLUTION INTRAVENOUS at 09:47

## 2024-02-05 RX ADMIN — SODIUM CHLORIDE: 9 INJECTION, SOLUTION INTRAVENOUS at 20:46

## 2024-02-05 RX ADMIN — ESCITALOPRAM OXALATE 20 MG: 10 TABLET ORAL at 12:01

## 2024-02-05 RX ADMIN — MORPHINE SULFATE 2 MG: 2 INJECTION, SOLUTION INTRAMUSCULAR; INTRAVENOUS at 01:57

## 2024-02-05 RX ADMIN — Medication 1 CAPSULE: at 20:52

## 2024-02-05 RX ADMIN — SUCRALFATE 1 G: 1 TABLET ORAL at 20:51

## 2024-02-05 RX ADMIN — BENZTROPINE MESYLATE 0.5 MG: 0.5 TABLET ORAL at 12:01

## 2024-02-05 RX ADMIN — SUCRALFATE 1 G: 1 TABLET ORAL at 05:53

## 2024-02-05 RX ADMIN — MORPHINE SULFATE 2 MG: 2 INJECTION, SOLUTION INTRAMUSCULAR; INTRAVENOUS at 22:57

## 2024-02-05 RX ADMIN — QUETIAPINE FUMARATE 50 MG: 25 TABLET ORAL at 11:59

## 2024-02-05 RX ADMIN — MORPHINE SULFATE 2 MG: 2 INJECTION, SOLUTION INTRAMUSCULAR; INTRAVENOUS at 06:45

## 2024-02-05 ASSESSMENT — PAIN - FUNCTIONAL ASSESSMENT: PAIN_FUNCTIONAL_ASSESSMENT: 0-10

## 2024-02-05 ASSESSMENT — PAIN DESCRIPTION - ORIENTATION
ORIENTATION: LOWER
ORIENTATION: ANTERIOR

## 2024-02-05 ASSESSMENT — PAIN SCALES - GENERAL
PAINLEVEL_OUTOF10: 6
PAINLEVEL_OUTOF10: 8
PAINLEVEL_OUTOF10: 9
PAINLEVEL_OUTOF10: 8
PAINLEVEL_OUTOF10: 8
PAINLEVEL_OUTOF10: 9
PAINLEVEL_OUTOF10: 8
PAINLEVEL_OUTOF10: 8
PAINLEVEL_OUTOF10: 7
PAINLEVEL_OUTOF10: 6

## 2024-02-05 ASSESSMENT — PAIN DESCRIPTION - DESCRIPTORS
DESCRIPTORS: SHARP
DESCRIPTORS: SHARP
DESCRIPTORS: ACHING
DESCRIPTORS: ACHING;SHARP
DESCRIPTORS: ACHING;SHARP
DESCRIPTORS: ACHING
DESCRIPTORS: SHARP

## 2024-02-05 ASSESSMENT — PAIN DESCRIPTION - LOCATION
LOCATION: ABDOMEN

## 2024-02-05 NOTE — OP NOTE
PROCEDURE NOTE    DATE OF PROCEDURE: 2/5/2024     SURGEON: Jada Mclain MD  Facility: [facility]  ASSISTANT: None  Anesthesia: Monitored anesthesia care  PREOPERATIVE DIAGNOSIS: Achalasia with severe heartburn and dysphagia for Botox injection    Diagnosis:    POSTOPERATIVE DIAGNOSIS: As described below    OPERATION: Upper GI endoscopy with Biopsy    ANESTHESIA: Moderate Sedation     ESTIMATED BLOOD LOSS: Less than 50 ml    COMPLICATIONS: None.     SPECIMENS:  Was Not Obtained    HISTORY: The patient is a 78 y.o. year old female with history of above preop diagnosis.  I recommended esophagogastroduodenoscopy with possible biopsy and I explained the risk, benefits, expected outcome, and alternatives to the procedure.  Risks included but are not limited to bleeding, infection, respiratory distress, hypotension, and perforation of the esophagus, stomach, or duodenum.  Patient understands and is in agreement.      PROCEDURE: The patient was given IV conscious sedation.  The patient's SPO2 remained above 90% throughout the procedure.The gastroscope was inserted orally and advanced under direct vision through the esophagus, through the stomach, through the pylorus, and into the descending duodenum.      Post sedation note :The patient's SPO2 remained above 90% throughout the procedure.the vital signs remained stable , and no immediate complication form the procedure noted, patient will be ready for d/c when criteria is met .      Findings:    Retropharyngeal area was grossly normal appearing    Esophagus: abnormal: tortuous lower esophagus with LA-grade-A esophagitis    Esophagogastric markings: Diaphragmatic hiatus- 38 cm; GE junction- 36 cm; Squamo-columnar junction- 36 cm    Stomach:    Fundus: normal    Body: normal    Antrum: normal    Duodenum:     Descending: normal    Bulb: normal    Botox (100 units mixed in 4 ml of saline) was injected into 4 quadrants into lower esophageal sphincter (1 ml into each

## 2024-02-05 NOTE — ANESTHESIA PRE PROCEDURE
Department of Anesthesiology  Preprocedure Note       Name:  Erin Ramirez   Age:  78 y.o.  :  1945                                          MRN:  [id number]         Date:  2024      Surgeon: Surgeon(s):  Jada Mclain MD    Procedure: Procedure(s):  *ADD ON, WANTS 3RD* EGD ESOPHAGOGASTRODUODENOSCOPY WITH BOTOX    Medications prior to admission:   Prior to Admission medications    Medication Sig Start Date End Date Taking? Authorizing Provider   clonazePAM (KLONOPIN) 0.5 MG tablet Take 1 tablet by mouth 3 times daily as needed for Anxiety for up to 7 days. Max Daily Amount: 1.5 mg 1/27/24 2/3/24  [place]ton Dominique MD   anastrozole (ARIMIDEX) 1 MG tablet  24   Devante Jeong MD   metoclopramide (REGLAN) 10 MG tablet  24   Devante Jeong MD   QUEtiapine (SEROQUEL) 100 MG tablet Take 1 tablet by mouth nightly    Devante Jeong MD   lactulose encephalopathy 10 GM/15ML SOLN solution Take 15 mLs by mouth as needed (if need for constipation)    Devante Jeong MD   senna (SENOKOT) 8.6 MG tablet Take 1 tablet by mouth as needed for Constipation    Devante Jeong MD   sodium chloride flush 0.9 % injection Infuse 50 mLs intravenously daily    Devante Jeong MD   cyanocobalamin 1000 MCG tablet Take 1 tablet by mouth daily 23   Devante Jeong MD   tamsulosin (FLOMAX) 0.4 MG capsule Take 1 capsule by mouth daily 10/2/23   Karen Borja MD   metoprolol succinate (TOPROL XL) 25 MG extended release tablet Take 1 tablet by mouth daily Hold for SBP<110 10/2/23   Karen Borja MD   gabapentin (NEURONTIN) 300 MG capsule Take 1 capsule by mouth in the morning, at noon, and at bedtime for 5 days. 10/2/23 10/7/23  Karen Borja MD   amitriptyline (ELAVIL) 25 MG tablet Take 1 tablet by mouth 3 times daily    Devante Jeong MD   benztropine (COGENTIN) 0.5 MG tablet Take 1 tablet by mouth 2 times daily    Devante Jeong MD

## 2024-02-06 VITALS
OXYGEN SATURATION: 92 % | HEART RATE: 64 BPM | WEIGHT: 179 LBS | TEMPERATURE: 98.2 F | RESPIRATION RATE: 18 BRPM | BODY MASS INDEX: 32.94 KG/M2 | HEIGHT: 62 IN | DIASTOLIC BLOOD PRESSURE: 58 MMHG | SYSTOLIC BLOOD PRESSURE: 135 MMHG

## 2024-02-06 PROCEDURE — 6370000000 HC RX 637 (ALT 250 FOR IP): Performed by: INTERNAL MEDICINE

## 2024-02-06 PROCEDURE — 6360000002 HC RX W HCPCS: Performed by: INTERNAL MEDICINE

## 2024-02-06 PROCEDURE — 99239 HOSP IP/OBS DSCHRG MGMT >30: CPT | Performed by: STUDENT IN AN ORGANIZED HEALTH CARE EDUCATION/TRAINING PROGRAM

## 2024-02-06 PROCEDURE — C9113 INJ PANTOPRAZOLE SODIUM, VIA: HCPCS | Performed by: INTERNAL MEDICINE

## 2024-02-06 PROCEDURE — A4216 STERILE WATER/SALINE, 10 ML: HCPCS | Performed by: INTERNAL MEDICINE

## 2024-02-06 PROCEDURE — 6370000000 HC RX 637 (ALT 250 FOR IP): Performed by: STUDENT IN AN ORGANIZED HEALTH CARE EDUCATION/TRAINING PROGRAM

## 2024-02-06 PROCEDURE — 6370000000 HC RX 637 (ALT 250 FOR IP): Performed by: NURSE PRACTITIONER

## 2024-02-06 PROCEDURE — 2580000003 HC RX 258: Performed by: INTERNAL MEDICINE

## 2024-02-06 RX ORDER — PANTOPRAZOLE SODIUM 40 MG/1
40 TABLET, DELAYED RELEASE ORAL
Status: DISCONTINUED | OUTPATIENT
Start: 2024-02-06 | End: 2024-02-06 | Stop reason: HOSPADM

## 2024-02-06 RX ORDER — CIPROFLOXACIN 500 MG/1
500 TABLET, FILM COATED ORAL 2 TIMES DAILY
Qty: 4 TABLET | Refills: 0 | Status: SHIPPED | OUTPATIENT
Start: 2024-02-06 | End: 2024-02-08

## 2024-02-06 RX ORDER — SUCRALFATE 1 G/1
1 TABLET ORAL
Qty: 120 TABLET | Refills: 3 | Status: SHIPPED | OUTPATIENT
Start: 2024-02-06

## 2024-02-06 RX ORDER — CLONAZEPAM 0.5 MG/1
0.5 TABLET ORAL 3 TIMES DAILY PRN
Status: DISCONTINUED | OUTPATIENT
Start: 2024-02-06 | End: 2024-02-06 | Stop reason: HOSPADM

## 2024-02-06 RX ORDER — HYDROCODONE BITARTRATE AND ACETAMINOPHEN 5; 325 MG/1; MG/1
2 TABLET ORAL EVERY 4 HOURS PRN
Status: DISCONTINUED | OUTPATIENT
Start: 2024-02-06 | End: 2024-02-06 | Stop reason: HOSPADM

## 2024-02-06 RX ORDER — HYDROCODONE BITARTRATE AND ACETAMINOPHEN 5; 325 MG/1; MG/1
1 TABLET ORAL EVERY 4 HOURS PRN
Status: DISCONTINUED | OUTPATIENT
Start: 2024-02-06 | End: 2024-02-06 | Stop reason: HOSPADM

## 2024-02-06 RX ADMIN — GUAIFENESIN 600 MG: 600 TABLET, EXTENDED RELEASE ORAL at 09:35

## 2024-02-06 RX ADMIN — HYDROCODONE BITARTRATE AND ACETAMINOPHEN 2 TABLET: 5; 325 TABLET ORAL at 10:45

## 2024-02-06 RX ADMIN — AMITRIPTYLINE HYDROCHLORIDE 25 MG: 25 TABLET, FILM COATED ORAL at 13:33

## 2024-02-06 RX ADMIN — PANTOPRAZOLE SODIUM 40 MG: 40 TABLET, DELAYED RELEASE ORAL at 15:13

## 2024-02-06 RX ADMIN — ESCITALOPRAM OXALATE 20 MG: 10 TABLET ORAL at 09:36

## 2024-02-06 RX ADMIN — CLONAZEPAM 0.5 MG: 1 TABLET ORAL at 02:14

## 2024-02-06 RX ADMIN — AMITRIPTYLINE HYDROCHLORIDE 25 MG: 25 TABLET, FILM COATED ORAL at 09:35

## 2024-02-06 RX ADMIN — SODIUM CHLORIDE: 9 INJECTION, SOLUTION INTRAVENOUS at 04:19

## 2024-02-06 RX ADMIN — MORPHINE SULFATE 2 MG: 2 INJECTION, SOLUTION INTRAMUSCULAR; INTRAVENOUS at 04:49

## 2024-02-06 RX ADMIN — CLONAZEPAM 0.5 MG: 0.5 TABLET ORAL at 09:34

## 2024-02-06 RX ADMIN — HYDROCODONE BITARTRATE AND ACETAMINOPHEN 2 TABLET: 5; 325 TABLET ORAL at 15:12

## 2024-02-06 RX ADMIN — METOPROLOL SUCCINATE 25 MG: 25 TABLET, EXTENDED RELEASE ORAL at 09:34

## 2024-02-06 RX ADMIN — Medication 1000 MCG: at 09:36

## 2024-02-06 RX ADMIN — TAMSULOSIN HYDROCHLORIDE 0.4 MG: 0.4 CAPSULE ORAL at 09:35

## 2024-02-06 RX ADMIN — BENZTROPINE MESYLATE 0.5 MG: 0.5 TABLET ORAL at 09:36

## 2024-02-06 RX ADMIN — SUCRALFATE 1 G: 1 TABLET ORAL at 17:48

## 2024-02-06 RX ADMIN — BUSPIRONE HYDROCHLORIDE 10 MG: 5 TABLET ORAL at 09:35

## 2024-02-06 RX ADMIN — SUCRALFATE 1 G: 1 TABLET ORAL at 10:46

## 2024-02-06 RX ADMIN — ONDANSETRON 4 MG: 4 TABLET, ORALLY DISINTEGRATING ORAL at 13:40

## 2024-02-06 RX ADMIN — SUCRALFATE 1 G: 1 TABLET ORAL at 05:01

## 2024-02-06 RX ADMIN — PANTOPRAZOLE SODIUM 40 MG: 40 INJECTION, POWDER, FOR SOLUTION INTRAVENOUS at 02:14

## 2024-02-06 RX ADMIN — Medication 1 CAPSULE: at 09:36

## 2024-02-06 RX ADMIN — QUETIAPINE FUMARATE 50 MG: 25 TABLET ORAL at 09:35

## 2024-02-06 ASSESSMENT — PAIN SCALES - GENERAL
PAINLEVEL_OUTOF10: 8
PAINLEVEL_OUTOF10: 8
PAINLEVEL_OUTOF10: 7

## 2024-02-06 ASSESSMENT — ENCOUNTER SYMPTOMS
ABDOMINAL DISTENTION: 0
EYE DISCHARGE: 0
EYE ITCHING: 0
CONSTIPATION: 0
CHEST TIGHTNESS: 0
DIARRHEA: 0
BACK PAIN: 0
COLOR CHANGE: 0
ABDOMINAL PAIN: 0
APNEA: 0
FACIAL SWELLING: 0

## 2024-02-06 ASSESSMENT — PAIN DESCRIPTION - DESCRIPTORS
DESCRIPTORS: ACHING
DESCRIPTORS: ACHING;SHARP
DESCRIPTORS: ACHING;SHARP

## 2024-02-06 ASSESSMENT — PAIN DESCRIPTION - ORIENTATION
ORIENTATION: LOWER
ORIENTATION: LOWER

## 2024-02-06 ASSESSMENT — PAIN DESCRIPTION - LOCATION
LOCATION: ABDOMEN;RIB CAGE
LOCATION: ABDOMEN
LOCATION: ABDOMEN;RIB CAGE

## 2024-02-06 NOTE — DISCHARGE INSTR - COC
Klebsiella urine 2022      MDRO (multi-drug resistant organism) 12/13/20 12/15/20 07/31/22 Culture, Urine        Proteus Urine 2021  Klebsiella Urine 2022    Resolved    COVID-19 23 COVID-19, Rapid   01/15/23 Infection                        Nurse Assessment:  Last Vital Signs: BP (!) 153/53   Pulse 73   Temp 97.9 °F (36.6 °C) (Oral)   Resp 22   Ht 1.575 m (5' 2\")   Wt 81.2 kg (179 lb)   SpO2 93%   BMI 32.74 kg/m²     Last documented pain score (0-10 scale): Pain Level: 8  Last Weight:   Wt Readings from Last 1 Encounters:   24 81.2 kg (179 lb)     Mental Status:  oriented, alert, coherent, logical, thought processes intact, and able to concentrate and follow conversation    IV Access:  - None    Nursing Mobility/ADLs:  Walking   Dependent  Transfer  Dependent  Bathing  Assisted  Dressing  Assisted  Toileting  Assisted  Feeding  Independent  Med Admin  Independent  Med Delivery   whole    Wound Care Documentation and Therapy:        Elimination:  Continence:   Bowel: No  Bladder: No  Urinary Catheter: None   Colostomy/Ileostomy/Ileal Conduit: No       Date of Last BM: 2024    Intake/Output Summary (Last 24 hours) at 2024 1202  Last data filed at 2024 0943  Gross per 24 hour   Intake 150 ml   Output 4850 ml   Net -4700 ml     I/O last 3 completed shifts:  In: 3980 [P.O.:150; I.V.:3580; IV Piggyback:250]  Out: 5750 [Urine:5750]    Safety Concerns:     At Risk for Falls    Impairments/Disabilities:      None    Nutrition Therapy:  Current Nutrition Therapy:   - Oral Diet:  Easy to Chew    Routes of Feeding: Oral  Liquids: No Restrictions  Daily Fluid Restriction: no  Last Modified Barium Swallow with Video (Video Swallowing Test): not done    Treatments at the Time of Hospital Discharge:   Respiratory Treatments: n/a  Oxygen Therapy:  is not on home oxygen therapy.  Ventilator:    - No ventilator support    Rehab Therapies: Physical Therapy and

## 2024-02-06 NOTE — CARE COORDINATION
Transitional Planning:  Spoke with Caron at [facility].  Informed of discharge.  They can receive today.  Report:  [phone]  Fax:  [phone]    [facility].  ZARA faxed to SNF.    15:24  Transport today [facility] at 5:15 pm.  Caron , patient & nurse informed of time.

## 2024-02-06 NOTE — DISCHARGE SUMMARY
[facility]  Office: [phone]  Marcelo Quijano DO, Tyler Mari DO, Azam Wong DO, Charles Jensen DO, Carmen Jay MD, Shannen Lewis MD, Moe Dolan MD, Marsha Wiggins MD,  Robinson Figueroa MD, Penelope Alejandro MD, Adry Velazquez MD,  Yajaira Green DO, Mario Summers MD, Camilo Paula MD, Rolando Quijano DO, Heidi Crews MD,  Arthur Hebert DO, Patt Solis MD, Ligia Black MD, Destiny Chan MD, Karen Borja MD,  Familia Campos MD, Payton Canales MD, Carlos Resendez MD, Mare Hung MD, Howard Vasquez MD, Hever Norris MD, Isak Fritz DO, Mauricio Hall DO, Juan Campa MD,  Colin Giron MD, Shirley Waterhouse, CNP,  Vonda Diaz, CNP, Alfred Kuhn, CNP,  Polina Bowman, IVY, Carlyn Soliz, CNP, Nati Gerard, CNP, Izabela Segal CNP, Ania Chandra, CNP, Haley Deras, CNP, Ghazal Delacruz, PA-C, Leigh Artis PA-C, Kathleen Hernandez, CNP, Jayshree Celaya, CNS, Rachel Rodriguez, CNP, Sarah Perez, CNP, Tracy Schwab, CNP         [facility]   IN-PATIENT SERVICE   [facility]    Discharge Summary     Patient ID: Erin Ramirez  :  1945   MRN: [id number]     ACCOUNT:  [id number]   Patient's PCP: No primary care provider on file.  Admit Date: 2024   Discharge Date: 2024     Length of Stay: 5  Code Status:  Full Code  Admitting Physician: Yue Mccormick MD  Discharge Physician: Adry Velazquez MD     Active Discharge Diagnoses:     Hospital Problem Lists:  Principal Problem:    Gastrointestinal hemorrhage associated with acute gastritis  Active Problems:    Essential hypertension    Chronic respiratory failure with hypoxia (HCC)    Acute blood loss anemia    Stage 3 chronic kidney disease (HCC)    Diastolic dysfunction without heart failure    Left lower lobe pneumonia    Achalasia  Resolved Problems:    * No resolved hospital problems. *      Admission Condition:  stable     Discharged Condition: stable    Hospital

## 2024-02-06 NOTE — PLAN OF CARE
Problem: Safety - Adult  Goal: Free from fall injury  2/6/2024 1220 by Eugenia Lazar RN  Outcome: Progressing  2/6/2024 0610 by Patricia Ross RN  Outcome: Progressing     Problem: Pain  Goal: Verbalizes/displays adequate comfort level or baseline comfort level  Outcome: Progressing     Problem: Skin/Tissue Integrity  Goal: Absence of new skin breakdown  Description: 1.  Monitor for areas of redness and/or skin breakdown  2.  Assess vascular access sites hourly  3.  Every 4-6 hours minimum:  Change oxygen saturation probe site  4.  Every 4-6 hours:  If on nasal continuous positive airway pressure, respiratory therapy assess nares and determine need for appliance change or resting period.  2/6/2024 1220 by Eugenia Lazar RN  Outcome: Progressing  2/6/2024 0610 by Patricia Ross RN  Outcome: Progressing     Problem: Discharge Planning  Goal: Discharge to home or other facility with appropriate resources  Outcome: Progressing  Flowsheets (Taken 2/6/2024 0940)  Discharge to home or other facility with appropriate resources:   Identify barriers to discharge with patient and caregiver   Arrange for needed discharge resources and transportation as appropriate   Identify discharge learning needs (meds, wound care, etc)   Refer to discharge planning if patient needs post-hospital services based on physician order or complex needs related to functional status, cognitive ability or social support system

## 2024-02-06 NOTE — PLAN OF CARE
GI note.       Patient underwent EGD today with Botox injections    Okay for soft diet  Strict reflux precautions  Patient to follow-up in office in 3 to 4 weeks  Okay to be discharged from GI perspective    GI will sign off      ..Madisyn Miles, MARGOTH - CNP

## 2024-02-06 NOTE — PROGRESS NOTES
[facility] Gastroenterology Progress Note    Erin Ramirez is a 78 y.o. female patient.  Hospitalization Day:3      Chief consult reason:   Hematemesis    Subjective: Patient seen and examined.  Patient overall feels well.  Patient reports feeling anxious about procedure tomorrow but is hoping to have significant improvement with eating.      Objective:   VITALS:  BP (!) 111/53   Pulse 62   Temp 98.5 °F (36.9 °C) (Oral)   Resp 18   Ht 1.575 m (5' 2.01\")   Wt 81.9 kg (180 lb 9.6 oz)   SpO2 94%   BMI 33.02 kg/m²   TEMPERATURE:  Current - Temp: 98.5 °F (36.9 °C); Max - Temp  Av.4 °F (36.9 °C)  Min: 98 °F (36.7 °C)  Max: 98.6 °F (37 °C)    Physical Assessment:  General appearance:  alert, cooperative and no distress  Mental Status:  oriented to person, place and time and normal affect  Lungs:  clear to auscultation bilaterally, normal effort  Heart:  regular rate and rhythm, no murmur  Abdomen:  soft, nontender, nondistended, normal bowel sounds  Extremities:  no edema, no redness, No clubbing  Skin:  warm, dry, no gross lesions or rashes    CURRENT MEDICATIONS:  Scheduled Meds:   vancomycin  1,500 mg IntraVENous Q24H    guaiFENesin  600 mg Oral BID    sucralfate  1 g Oral 4x Daily AC & HS    sodium chloride flush  5-40 mL IntraVENous 2 times per day    pantoprazole (PROTONIX) 40 mg in sodium chloride (PF) 0.9 % 10 mL injection  40 mg IntraVENous Q12H    amitriptyline  25 mg Oral TID    benztropine  0.5 mg Oral BID    busPIRone  10 mg Oral BID    cyanocobalamin  1,000 mcg Oral Daily    escitalopram  20 mg Oral Daily    lactobacillus  1 capsule Oral BID    metoprolol succinate  25 mg Oral Daily    tamsulosin  0.4 mg Oral Daily    QUEtiapine  100 mg Oral Nightly    QUEtiapine  50 mg Oral Daily    vancomycin (VANCOCIN) intermittent dosing (placeholder)   Other RX Placeholder     Continuous Infusions:   sodium chloride 150 mL/hr at 24 1644    sodium chloride       PRN 
[facility]   Pharmacy Pharmacokinetic Monitoring Service - Vancomycin     Erin Ramirez is a 78 y.o. female starting on vancomycin therapy for Pneumonia. Pharmacy consulted by Isaías Ryan  for monitoring and adjustment.    Target Concentration: Goal AUC/MOHAN 400-600 mg*hr/L    Additional Antimicrobials: Ceftriaxone in ED    Pertinent Laboratory Values:   Wt Readings from Last 1 Encounters:   02/01/24 81.2 kg (179 lb 0.2 oz)     Temp Readings from Last 1 Encounters:   02/01/24 98.8 °F (37.1 °C)     Estimated Creatinine Clearance: 46 mL/min (A) (based on SCr of 1 mg/dL (H)).  Recent Labs     02/01/24  1932   CREATININE 1.0*   BUN 22   WBC 12.6*     Procalcitonin:     Pertinent Cultures:  Culture Date Source Results        MRSA Nasal Swab: not ordered. Order placed by pharmacy.    Plan:  Dosing recommendations based on Bayesian software  Start vancomycin 1750 mg IV x1 dose now, then followed by vancomycin 1250 mg IV every 24 hours  Anticipated AUC of 538 and trough concentration of 15.1 at steady state  Renal labs as indicated      Pharmacy will continue to monitor patient and adjust therapy as indicated    Thank you for the consult,  Derian Feliciano RPH  2/1/2024 11:20 PM    
[facility]   Pharmacy Pharmacokinetic Monitoring Service - Vancomycin    Consulting Provider: Isaías Hardy   Indication: Pneumonia  Target Concentration: Goal AUC/MOHAN 400-600 mg*hr/L  Day of Therapy: 4  Additional Antimicrobials: none    Pertinent Laboratory Values:   Wt Readings from Last 1 Encounters:   02/04/24 81.9 kg (180 lb 9.6 oz)     Temp Readings from Last 1 Encounters:   02/04/24 98.2 °F (36.8 °C) (Oral)     Estimated Creatinine Clearance: 66 mL/min (based on SCr of 0.7 mg/dL).  Recent Labs     02/01/24  1932 02/02/24  0415 02/03/24  0735   CREATININE 1.0* 0.7 0.7   BUN 22 21 12   WBC 12.6*  --  6.5     Procalcitonin: 0.15 on 2/3/24    Pertinent Cultures:  Culture Date Source Results   2/1/24 2/2/24 Blood x2  Urine x1 N/a  E. Faecalis   MRSA Nasal Swab: was ordered by provider, awaiting results.    Recent vancomycin administrations                     vancomycin (VANCOCIN) 1,250 mg in sodium chloride 0.9 % 250 mL IVPB ([id number]) (mg) 1,250 mg New Bag 02/03/24 2251     1,250 mg New Bag 02/02/24 2321    vancomycin (VANCOCIN) 1750 mg in sodium chloride 0.9 % 500 mL IVPB (mg) 1,750 mg New Bag 02/02/24 0036                    Assessment:  Date/Time Current Dose Concentration Timing of Concentration (h) AUC   2/4/24 1250 mg q24hrs 20.9 7hrs 3min 433   Note: Serum concentrations collected for AUC dosing may appear elevated if collected in close proximity to the dose administered, this is not necessarily an indication of toxicity    Plan:  Increase dose to 1500mg q24hrs for estimated trough of 12.7 and AUC of 512   Pharmacy will continue to monitor patient and adjust therapy as indicated    Thank you for the consult,  Alfred Jeffries [facility]  2/4/2024 12:15 PM     
[facility]  Office: [phone]  Marcelo Quijano DO, Tyler Mari DO, Azam Wong DO, Charles Jensen DO, Carmen Jay MD, Shannen Lewis MD, Moe Dolan MD, Marsha Wiggins MD,  Robinson Figueroa MD, Penelope Alejandro MD, Adry Velazquez MD,  Yajaira Green DO, Mario Summers MD, Camilo Paula MD, Rolando Quijano DO, Heidi Crews MD,  Arthur Hebert DO, Patt Solis MD, Ligia Black MD, Destiny Chan MD, Karen Borja MD,  Familia Campos MD, Payton Canales MD, Carlos Resendez MD, Mare Hung MD, Howard Vasquez MD, Hever Norris MD, Isak Fritz DO, Mauricio Hall DO, Juan Campa MD,  Colin Giron MD, Shirley Waterhouse, CNP,  Vonda Diaz CNP, Alfred Kuhn, CNP,  Polina Bowman, IVY, Carlyn Soliz, CNP, Nati Gerard, CNP, Izabela Segal CNP, Ania Chandra CNP, Haley Deras, CNP, Ghazal Delacruz, PA-C, Leigh Artis, PA-C, Kathleen Hernandez, CNP, Jayshree Celaya, CNS, Rachel Rodriguez, CNP, Sarah Perez, CNP, Tracy Schwab, CNP         [facility]   IN-PATIENT SERVICE   [facility]    Progress Note    2/5/2024    11:59 AM    Name:   Erin Ramirez  MRN:     [id number]     Acct:      [id number]   Room:   0344/0344-02  IP Day:  4  Admit Date:  2/1/2024  7:18 PM    PCP:   No primary care provider on file.  Code Status:  Full Code    Subjective:     C/C:   Chief Complaint   Patient presents with    Emesis    Nausea    Abdominal Pain     Coffee ground emesis, recent GI bleed     Interval History Status: improved.     Pt lives at nursing home  Agreeable for EGD if required     S/p endoscopy with Botox injection to help with achalasia 2/5  Pending Diet orders per GI. RN notified     Can likely discharge back to nursing once abdominal pain and she tolerates po tomorrow     Brief History:     This is a 78-year-old female that presents with a complaint of nausea, vomiting and coffee-ground emesis. She apparently had a recent hospitalization and underwent 
**OR** acetaminophen, LORazepam, albuterol, clonazePAM, polyethylene glycol, hypromellose, morphine      Data Review:  LABS and IMAGING:     CBC  Recent Labs     02/01/24 1932 02/02/24  0415 02/02/24  1354 02/02/24  1836 02/02/24  2340 02/03/24  0735   WBC 12.6*  --   --   --   --  6.5   HGB 10.9* 9.2* 8.9* 9.1* 8.3* 8.8*   HCT 35.2* 29.9* 29.4* 28.6* 27.3* 27.8*   MCV 86.7  --   --   --   --  87.4   MCHC 31.0  --   --   --   --  31.7   RDW 15.0*  --   --   --   --  15.4*     --   --   --   --  201       Immature PLTs   Lab Results   Component Value Date/Time    PLTFLUORE 170 07/23/2023 10:59 AM    PLTFLUORE 147 02/05/2023 02:57 AM    PLTFLUORE 130 02/04/2023 03:52 AM       PT/INR  Recent Labs     02/01/24 1932 02/02/24  0415   PROTIME 14.5 14.7   INR 1.2 1.2       ANEMIA STUDIES  Recent Labs     02/02/24 0415   TIBC 304   IRON 26*       BMP  Recent Labs     02/01/24 1932 02/02/24  0415 02/03/24  0735    138 139   K 3.9 3.4* 3.9    106 108*   CO2 26 25 20   BUN 22 21 12   CREATININE 1.0* 0.7 0.7   GLUCOSE 149* 111* 98   CALCIUM 9.5 7.9* 8.4*   MG  --  1.6  --        LFTS  Recent Labs     02/01/24 1932   ALKPHOS 124*   ALT 17   AST 23   BILITOT 0.3   LABALBU 4.0       AMYLASE/LIPASE/AMMONIA  Recent Labs     02/01/24 1932   LIPASE 14       Acute Hepatitis Panel   Lab Results   Component Value Date/Time    HEPBSAG NONREACTIVE 05/16/2020 01:01 PM    HEPCAB NONREACTIVE 05/16/2020 01:01 PM    HEPBIGM NONREACTIVE 05/16/2020 01:01 PM    HEPAIGM NONREACTIVE 05/16/2020 01:01 PM       HCV Genotype   No components found for: \"HEPATITISCGENOTYPE\"    HCV Quantitative   No results found for: \"HCVQNT\"    LIVER WORK UP:    AFP  No results found for: \"AFP\"    Alpha 1 antitrypsin   No results found for: \"A1A\"    Anti - Liver/Kidney Ab  No results found for: \"LIVER-KIDNEYMICROSOMALAB\"    TOÑO  Lab Results   Component Value Date/Time    TOÑO NEGATIVE 05/16/2020 01:01 PM       AMA  Lab Results   Component Value 
ondansetron, acetaminophen **OR** acetaminophen, LORazepam, albuterol, clonazePAM, polyethylene glycol, hypromellose    Data:     Past Medical History:   has a past medical history of A-fib ([facility]), Acquired absence of kidney, Adult hypertrophic pyloric stenosis, Agoraphobia with panic disorder, Agoraphobia without history of panic disorder, Allergic rhinitis, Anemia, unspecified, Anxiety, Anxiety disorder, Arthritis, Atrial fibrillation ([facility]), Back pain, Bronchitis, Caffeine use, Cardiomegaly, Carpal tunnel syndrome, Cataracts, bilateral, Centriacinar emphysema ([facility]), Chronic kidney disease, stage III (moderate) ([facility]), Chronic pain, Constipation, Constipation, COPD (chronic obstructive pulmonary disease) ([facility]), Cystitis with hematuria, Depression, Diaphragmatic hernia without obstruction or gangrene, Diarrhea, Difficulty walking, Dry eye syndrome of unspecified lacrimal gland, Esophageal obstruction, FOM (frequency of micturition), Foot drop, right foot, Gastro-esophageal reflux disease with esophagitis, with bleeding, GERD (gastroesophageal reflux disease), H/O gastric ulcer, HTN (hypertension), Hyperlipidemia, Hypertension, essential, Klebsiella pneumoniae (k. pneumoniae) as the cause of diseases classified elsewhere, Major depression, recurrent ([facility]), Mitral valve prolapse, Mixed hyperlipidemia, Mumps, Muscle weakness (generalized), MVP (mitral valve prolapse), Old myocardial infarction, Personal history of disease of digestive system, Psychophysiological insomnia, Recurrent UTI, S/P PICC central line placement, SBO (small bowel obstruction) ([facility]), Sepsis ([facility]), Sinusitis, Tracheostomy in place ([facility]), Ulcerative esophagitis, Urinary tract infection, site not specified, Uses wheelchair, Wears glasses, and Wellness examination.    Social History:   reports that she has quit smoking. Her smoking use included cigarettes. She has a 50.0 pack-year smoking history. She has never used smokeless tobacco. She reports that 
05:41 AM    POCHCO3 28.6 05/30/2020 05:41 AM    NBEA NOT REPORTED 05/30/2020 05:41 AM    PBEA 4 05/30/2020 05:41 AM    DZT2OZM 30 05/30/2020 05:41 AM    BMMC5MHY 99 05/30/2020 05:41 AM    FIO2 NOT REPORTED 11/23/2021 09:01 PM     Lab Results   Component Value Date/Time    SPECIAL  L HAND 10ML 02/01/2024 08:29 PM     Lab Results   Component Value Date/Time    CULTURE ENTEROCOCCUS FAECALIS >100,000 CFU/ML (A) 02/02/2024 12:48 AM       Radiology:  XR CHEST PORTABLE    Result Date: 2/2/2024  Retrocardiac left lower lobe opacity suggests possible pneumonia. Follow-up chest x-ray is recommended.  No active pleural disease.  No evidence of pulmonary edema.       Physical Examination:        General appearance:  alert, cooperative and no distress  Mental Status:  oriented to person, place and time and normal affect  Lungs:  clear to auscultation bilaterally, normal effort  Heart:  regular rate and rhythm, no murmur  Abdomen:  soft, nontender, nondistended, normal bowel sounds, no masses, hepatomegaly, splenomegaly  Extremities:  no edema, redness, tenderness in the calves  Skin:  no gross lesions, rashes, induration    Assessment:        Hospital Problems             Last Modified POA    * (Principal) Gastrointestinal hemorrhage associated with acute gastritis 2/2/2024 Yes    Essential hypertension 2/2/2024 Yes    Chronic respiratory failure with hypoxia (HCC) 2/2/2024 Yes    Acute blood loss anemia 2/2/2024 Yes    Stage 3 chronic kidney disease (HCC) 2/2/2024 Yes    Diastolic dysfunction without heart failure 2/2/2024 Yes    Left lower lobe pneumonia 2/2/2024 Yes       Plan:        Serial H&H, transfuse if needed  Monitor and control blood pressure  GI reevaluation  Continue PPI  Continue Rocephin for possible pneumonia pending cultures and follow-up chest x-rays  DVT prophylaxis  Continue home meds with sips pending GI workup  Continue efforts for pain control, was pain-free at time of evaluation.    Yeu Mccormick, 
 Heart sounds: Murmur heard.   Pulmonary:      Breath sounds: No wheezing or rales.   Abdominal:      General: There is no distension.      Palpations: Abdomen is soft.      Tenderness: There is no abdominal tenderness.   Musculoskeletal:         General: No deformity.      Cervical back: Neck supple.      Right lower leg: No edema.      Left lower leg: No edema.   Skin:     General: Skin is warm and dry.      Capillary Refill: Capillary refill takes 2 to 3 seconds.      Coloration: Skin is pale.   Neurological:      Mental Status: She is alert and oriented to person, place, and time.   Psychiatric:         Mood and Affect: Mood normal.         Behavior: Behavior normal.      Comments: Anxious affect         Assessment:        Hospital Problems             Last Modified POA    * (Principal) Gastrointestinal hemorrhage associated with acute gastritis 2/2/2024 Yes    Essential hypertension 2/2/2024 Yes    Chronic respiratory failure with hypoxia (HCC) 2/2/2024 Yes    Acute blood loss anemia 2/2/2024 Yes    Stage 3 chronic kidney disease (HCC) 2/2/2024 Yes    Diastolic dysfunction without heart failure 2/2/2024 Yes    Left lower lobe pneumonia 2/2/2024 Yes    Achalasia 2/3/2024 Yes       Plan:        Resume home meds  Advance diet  Discharge today  Stop fluids  Discussed with nurse and patient    Adry Velazquez MD  2/6/2024  10:00 AM     
needed  endoscopy on Monday with Botox injection to help with achalasia   Monitor and control blood pressure  GI reevaluation  Continue PPI  Continue Rocephin for possible pneumonia pending cultures and follow-up chest x-rays  DVT prophylaxis  Continue home meds with sips pending GI workup  Continue efforts for pain control, was pain-free at time of evaluation.    Yue Mccormick MD  2/4/2024  10:15 AM

## 2024-02-07 NOTE — DISCHARGE SUMMARY
Patient discharged to return to facility, [facility]. Patient transported via wheel chair. Report called to receiving facility all questions asked and answered. All belongings sent with patient transport.

## 2024-02-07 NOTE — PLAN OF CARE
Problem: Safety - Adult  Goal: Free from fall injury  2/6/2024 1901 by Eugenia Lazar RN  Outcome: Adequate for Discharge  2/6/2024 1220 by Eugenia Lazar RN  Outcome: Progressing  2/6/2024 0610 by Patricia Ross RN  Outcome: Progressing     Problem: Pain  Goal: Verbalizes/displays adequate comfort level or baseline comfort level  2/6/2024 1901 by Eugenia Lazar RN  Outcome: Adequate for Discharge  2/6/2024 1220 by Eugenia Lazar RN  Outcome: Progressing     Problem: Skin/Tissue Integrity  Goal: Absence of new skin breakdown  Description: 1.  Monitor for areas of redness and/or skin breakdown  2.  Assess vascular access sites hourly  3.  Every 4-6 hours minimum:  Change oxygen saturation probe site  4.  Every 4-6 hours:  If on nasal continuous positive airway pressure, respiratory therapy assess nares and determine need for appliance change or resting period.  2/6/2024 1901 by Eugenia Lazar RN  Outcome: Adequate for Discharge  2/6/2024 1220 by Eugenia Lazar RN  Outcome: Progressing  2/6/2024 0610 by Patricia Ross RN  Outcome: Progressing     Problem: Discharge Planning  Goal: Discharge to home or other facility with appropriate resources  2/6/2024 1901 by Eugenia Lazar RN  Outcome: Adequate for Discharge  2/6/2024 1220 by Eugenia Lazar RN  Outcome: Progressing  Flowsheets (Taken 2/6/2024 0940)  Discharge to home or other facility with appropriate resources:   Identify barriers to discharge with patient and caregiver   Arrange for needed discharge resources and transportation as appropriate   Identify discharge learning needs (meds, wound care, etc)   Refer to discharge planning if patient needs post-hospital services based on physician order or complex needs related to functional status, cognitive ability or social support system

## 2024-04-04 ENCOUNTER — OFFICE VISIT (OUTPATIENT)
Dept: GASTROENTEROLOGY | Age: 79
End: 2024-04-04
Payer: MEDICARE

## 2024-04-04 VITALS
SYSTOLIC BLOOD PRESSURE: 132 MMHG | BODY MASS INDEX: 32.74 KG/M2 | DIASTOLIC BLOOD PRESSURE: 75 MMHG | OXYGEN SATURATION: 95 % | WEIGHT: 179 LBS | HEART RATE: 69 BPM

## 2024-04-04 DIAGNOSIS — K44.9 HIATAL HERNIA: ICD-10-CM

## 2024-04-04 DIAGNOSIS — K21.00 GASTROESOPHAGEAL REFLUX DISEASE WITH ESOPHAGITIS WITHOUT HEMORRHAGE: Primary | ICD-10-CM

## 2024-04-04 DIAGNOSIS — R74.8 ALKALINE PHOSPHATASE ELEVATION: ICD-10-CM

## 2024-04-04 DIAGNOSIS — K22.0 ACHALASIA: ICD-10-CM

## 2024-04-04 DIAGNOSIS — Z90.49 HISTORY OF CHOLECYSTECTOMY: ICD-10-CM

## 2024-04-04 DIAGNOSIS — R10.84 GENERALIZED ABDOMINAL PAIN: ICD-10-CM

## 2024-04-04 PROCEDURE — G8427 DOCREV CUR MEDS BY ELIG CLIN: HCPCS | Performed by: INTERNAL MEDICINE

## 2024-04-04 PROCEDURE — 3078F DIAST BP <80 MM HG: CPT | Performed by: INTERNAL MEDICINE

## 2024-04-04 PROCEDURE — 1036F TOBACCO NON-USER: CPT | Performed by: INTERNAL MEDICINE

## 2024-04-04 PROCEDURE — 1090F PRES/ABSN URINE INCON ASSESS: CPT | Performed by: INTERNAL MEDICINE

## 2024-04-04 PROCEDURE — 3075F SYST BP GE 130 - 139MM HG: CPT | Performed by: INTERNAL MEDICINE

## 2024-04-04 PROCEDURE — G8417 CALC BMI ABV UP PARAM F/U: HCPCS | Performed by: INTERNAL MEDICINE

## 2024-04-04 PROCEDURE — G8400 PT W/DXA NO RESULTS DOC: HCPCS | Performed by: INTERNAL MEDICINE

## 2024-04-04 PROCEDURE — 99214 OFFICE O/P EST MOD 30 MIN: CPT | Performed by: INTERNAL MEDICINE

## 2024-04-04 PROCEDURE — 1124F ACP DISCUSS-NO DSCNMKR DOCD: CPT | Performed by: INTERNAL MEDICINE

## 2024-04-04 RX ORDER — SIMETHICONE 80 MG
80 TABLET,CHEWABLE ORAL EVERY 6 HOURS PRN
COMMUNITY

## 2024-04-04 RX ORDER — GABAPENTIN 300 MG/1
300 CAPSULE ORAL 3 TIMES DAILY
COMMUNITY

## 2024-04-04 RX ORDER — METOCLOPRAMIDE 10 MG/1
10 TABLET ORAL 4 TIMES DAILY PRN
COMMUNITY

## 2024-04-04 RX ORDER — TRAZODONE HYDROCHLORIDE 100 MG/1
100 TABLET ORAL NIGHTLY
COMMUNITY

## 2024-04-04 ASSESSMENT — ENCOUNTER SYMPTOMS
ABDOMINAL DISTENTION: 0
NAUSEA: 1
DIARRHEA: 1
COUGH: 0
CONSTIPATION: 1
TROUBLE SWALLOWING: 0
CHOKING: 0
RECTAL PAIN: 0
WHEEZING: 0
VOMITING: 0
BLOOD IN STOOL: 0
ANAL BLEEDING: 0
ABDOMINAL PAIN: 1

## 2024-04-04 NOTE — PROGRESS NOTES
GI CLINIC FOLLOW UP    INTERVAL HISTORY:   No referring provider defined for this encounter.    No chief complaint on file.          HISTORY OF PRESENT ILLNESS: Ms.Nina ARTIS Ramirez is a 78 y.o. female , referred for evaluation of GERD, gastric outlet obstruction, dysphagia and dilatation, colon polyps, hemorrhoids, Elevated alkaline phosphatase, s/p Nissen wrap, GERD, s/p cholecystectomy .    Pt presents for hospital follow-up for dysphagia  Previously followed with Dr Jarrell who was treating her for esophageal motility problems  S/p Nissen 2020  Negative GES 1/25/24    EGD 2/5/24 with LES Botox for achalasia. No longer reports dysphagia, globus sensation. She still reports nausea, heartburn, and requires Zofran before breakfast. Requests refill of Zofran.    Labs 2/2/24: anemia with % sat 9. She does not want IV iron.    Today has concerns about increased flatulence and bloating, but states that for the past few weeks when she passes gas, it feels like it is exiting her vagina. She has daily painless BM without straining. No fecal vaginal discharge. No fever/chills, unintentional weight loss            EGD:  PROCEDURE NOTE     DATE OF PROCEDURE: 2/5/2024      SURGEON: Jada Mclain MD  Facility: [facility]  ASSISTANT: None  Anesthesia: Monitored anesthesia care  PREOPERATIVE DIAGNOSIS: Achalasia with severe heartburn and dysphagia for Botox injection     Diagnosis:     POSTOPERATIVE DIAGNOSIS: As described below     OPERATION: Upper GI endoscopy with Biopsy     ANESTHESIA: Moderate Sedation      ESTIMATED BLOOD LOSS: Less than 50 ml     COMPLICATIONS: None.      SPECIMENS:  Was Not Obtained     HISTORY: The patient is a 78 y.o. year old female with history of above preop diagnosis.  I recommended esophagogastroduodenoscopy with possible biopsy and I explained the risk, benefits, expected outcome, and alternatives to the procedure.  Risks included but are not limited to bleeding, infection, respiratory distress,

## 2024-05-31 ENCOUNTER — HOSPITAL ENCOUNTER (OUTPATIENT)
Dept: ULTRASOUND IMAGING | Age: 79
End: 2024-05-31
Payer: MEDICARE

## 2024-05-31 ENCOUNTER — HOSPITAL ENCOUNTER (OUTPATIENT)
Dept: MAMMOGRAPHY | Age: 79
End: 2024-05-31
Payer: MEDICARE

## 2024-05-31 VITALS — WEIGHT: 179 LBS | HEIGHT: 62 IN | BODY MASS INDEX: 32.94 KG/M2

## 2024-05-31 DIAGNOSIS — R92.8 OTHER ABNORMAL AND INCONCLUSIVE FINDINGS ON DIAGNOSTIC IMAGING OF BREAST: ICD-10-CM

## 2024-05-31 DIAGNOSIS — J96.11 CHRONIC RESPIRATORY FAILURE WITH HYPOXIA (HCC): ICD-10-CM

## 2024-05-31 DIAGNOSIS — R92.8 ABNORMAL MAMMOGRAM: ICD-10-CM

## 2024-05-31 DIAGNOSIS — C50.812 MALIGNANT NEOPLASM OF OVERLAPPING SITES OF LEFT FEMALE BREAST, UNSPECIFIED ESTROGEN RECEPTOR STATUS (HCC): ICD-10-CM

## 2024-05-31 DIAGNOSIS — I48.0 PAROXYSMAL ATRIAL FIBRILLATION (HCC): ICD-10-CM

## 2024-05-31 PROCEDURE — G0279 TOMOSYNTHESIS, MAMMO: HCPCS

## 2024-05-31 PROCEDURE — 76642 ULTRASOUND BREAST LIMITED: CPT

## 2024-09-12 ENCOUNTER — HOSPITAL ENCOUNTER (EMERGENCY)
Age: 79
Discharge: HOME | End: 2024-09-12
Attending: EMERGENCY MEDICINE
Payer: MEDICARE

## 2024-09-12 ENCOUNTER — APPOINTMENT (OUTPATIENT)
Dept: GENERAL RADIOLOGY | Age: 79
End: 2024-09-12
Payer: MEDICARE

## 2024-09-12 ENCOUNTER — APPOINTMENT (OUTPATIENT)
Dept: CT IMAGING | Age: 79
End: 2024-09-12
Payer: MEDICARE

## 2024-09-12 VITALS
TEMPERATURE: 98.6 F | OXYGEN SATURATION: 94 % | HEART RATE: 93 BPM | BODY MASS INDEX: 32.01 KG/M2 | RESPIRATION RATE: 13 BRPM | SYSTOLIC BLOOD PRESSURE: 127 MMHG | DIASTOLIC BLOOD PRESSURE: 64 MMHG | WEIGHT: 175 LBS

## 2024-09-12 DIAGNOSIS — N30.01 ACUTE CYSTITIS WITH HEMATURIA: Primary | ICD-10-CM

## 2024-09-12 LAB
ALBUMIN SERPL-MCNC: 3.8 G/DL (ref 3.5–5.2)
ALBUMIN/GLOB SERPL: 1 {RATIO} (ref 1–2.5)
ALP SERPL-CCNC: 181 U/L (ref 35–104)
ALT SERPL-CCNC: 15 U/L (ref 10–35)
ANION GAP SERPL CALCULATED.3IONS-SCNC: 11 MMOL/L (ref 9–16)
AST SERPL-CCNC: 28 U/L (ref 10–35)
ATYPICAL LYMPHOCYTE ABSOLUTE COUNT: 0.23 K/UL
ATYPICAL LYMPHOCYTES: 3 %
BACTERIA URNS QL MICRO: ABNORMAL
BASOPHILS # BLD: 0.08 K/UL (ref 0–0.2)
BASOPHILS NFR BLD: 1 % (ref 0–2)
BILIRUB SERPL-MCNC: 0.4 MG/DL (ref 0–1.2)
BILIRUB UR QL STRIP: NEGATIVE
BUN SERPL-MCNC: 11 MG/DL (ref 8–23)
CALCIUM SERPL-MCNC: 9 MG/DL (ref 8.6–10.4)
CASTS #/AREA URNS LPF: ABNORMAL /LPF (ref 0–8)
CHLORIDE SERPL-SCNC: 103 MMOL/L (ref 98–107)
CLARITY UR: CLEAR
CO2 SERPL-SCNC: 24 MMOL/L (ref 20–31)
COLOR UR: YELLOW
CREAT SERPL-MCNC: 0.9 MG/DL (ref 0.5–0.9)
EOSINOPHIL # BLD: 0.23 K/UL (ref 0–0.44)
EOSINOPHILS RELATIVE PERCENT: 3 % (ref 1–4)
EPI CELLS #/AREA URNS HPF: ABNORMAL /HPF (ref 0–5)
ERYTHROCYTE [DISTWIDTH] IN BLOOD BY AUTOMATED COUNT: 16.1 % (ref 11.8–14.4)
GFR, ESTIMATED: 62 ML/MIN/1.73M2
GLUCOSE SERPL-MCNC: 107 MG/DL (ref 74–99)
GLUCOSE UR STRIP-MCNC: NEGATIVE MG/DL
HCT VFR BLD AUTO: 47.7 % (ref 36.3–47.1)
HGB BLD-MCNC: 15 G/DL (ref 11.9–15.1)
HGB UR QL STRIP.AUTO: NEGATIVE
IMM GRANULOCYTES # BLD AUTO: 0.16 K/UL (ref 0–0.3)
IMM GRANULOCYTES NFR BLD: 2 %
KETONES UR STRIP-MCNC: NEGATIVE MG/DL
LEUKOCYTE ESTERASE UR QL STRIP: ABNORMAL
LIPASE SERPL-CCNC: 18 U/L (ref 13–60)
LYMPHOCYTES NFR BLD: 1.72 K/UL (ref 1.1–3.7)
LYMPHOCYTES RELATIVE PERCENT: 22 % (ref 24–43)
MCH RBC QN AUTO: 28.6 PG (ref 25.2–33.5)
MCHC RBC AUTO-ENTMCNC: 31.4 G/DL (ref 28.4–34.8)
MCV RBC AUTO: 90.9 FL (ref 82.6–102.9)
MONOCYTES NFR BLD: 0.7 K/UL (ref 0.1–1.2)
MONOCYTES NFR BLD: 9 % (ref 3–12)
MORPHOLOGY: ABNORMAL
NEUTROPHILS NFR BLD: 60 % (ref 36–65)
NEUTS SEG NFR BLD: 4.68 K/UL (ref 1.5–8.1)
NITRITE UR QL STRIP: POSITIVE
NRBC BLD-RTO: 0 PER 100 WBC
PH UR STRIP: 6 [PH] (ref 5–8)
PLATELET # BLD AUTO: 161 K/UL (ref 138–453)
PMV BLD AUTO: 10.2 FL (ref 8.1–13.5)
POTASSIUM SERPL-SCNC: 3.9 MMOL/L (ref 3.7–5.3)
PROT SERPL-MCNC: 6.7 G/DL (ref 6.6–8.7)
PROT UR STRIP-MCNC: NEGATIVE MG/DL
RBC # BLD AUTO: 5.25 M/UL (ref 3.95–5.11)
RBC # BLD: ABNORMAL 10*6/UL
RBC #/AREA URNS HPF: ABNORMAL /HPF (ref 0–4)
SODIUM SERPL-SCNC: 138 MMOL/L (ref 136–145)
SP GR UR STRIP: 1.02 (ref 1–1.03)
TROPONIN I SERPL HS-MCNC: 12 NG/L (ref 0–14)
TROPONIN I SERPL HS-MCNC: 13 NG/L (ref 0–14)
UROBILINOGEN UR STRIP-ACNC: NORMAL EU/DL (ref 0–1)
WBC #/AREA URNS HPF: ABNORMAL /HPF (ref 0–5)
WBC OTHER # BLD: 7.8 K/UL (ref 3.5–11.3)

## 2024-09-12 PROCEDURE — 99285 EMERGENCY DEPT VISIT HI MDM: CPT

## 2024-09-12 PROCEDURE — 96365 THER/PROPH/DIAG IV INF INIT: CPT

## 2024-09-12 PROCEDURE — 85025 COMPLETE CBC W/AUTO DIFF WBC: CPT

## 2024-09-12 PROCEDURE — 74177 CT ABD & PELVIS W/CONTRAST: CPT

## 2024-09-12 PROCEDURE — 6370000000 HC RX 637 (ALT 250 FOR IP): Performed by: EMERGENCY MEDICINE

## 2024-09-12 PROCEDURE — 80053 COMPREHEN METABOLIC PANEL: CPT

## 2024-09-12 PROCEDURE — 84484 ASSAY OF TROPONIN QUANT: CPT

## 2024-09-12 PROCEDURE — 6360000004 HC RX CONTRAST MEDICATION: Performed by: EMERGENCY MEDICINE

## 2024-09-12 PROCEDURE — 87077 CULTURE AEROBIC IDENTIFY: CPT

## 2024-09-12 PROCEDURE — 6360000002 HC RX W HCPCS: Performed by: EMERGENCY MEDICINE

## 2024-09-12 PROCEDURE — 71045 X-RAY EXAM CHEST 1 VIEW: CPT

## 2024-09-12 PROCEDURE — 2580000003 HC RX 258: Performed by: EMERGENCY MEDICINE

## 2024-09-12 PROCEDURE — 87086 URINE CULTURE/COLONY COUNT: CPT

## 2024-09-12 PROCEDURE — 93005 ELECTROCARDIOGRAM TRACING: CPT

## 2024-09-12 PROCEDURE — 87186 SC STD MICRODIL/AGAR DIL: CPT

## 2024-09-12 PROCEDURE — 83690 ASSAY OF LIPASE: CPT

## 2024-09-12 PROCEDURE — 6360000002 HC RX W HCPCS

## 2024-09-12 PROCEDURE — 96375 TX/PRO/DX INJ NEW DRUG ADDON: CPT

## 2024-09-12 PROCEDURE — 81001 URINALYSIS AUTO W/SCOPE: CPT

## 2024-09-12 RX ORDER — METOCLOPRAMIDE HYDROCHLORIDE 5 MG/ML
10 INJECTION INTRAMUSCULAR; INTRAVENOUS ONCE
Status: COMPLETED | OUTPATIENT
Start: 2024-09-12 | End: 2024-09-12

## 2024-09-12 RX ORDER — ONDANSETRON 2 MG/ML
INJECTION INTRAMUSCULAR; INTRAVENOUS
Status: COMPLETED
Start: 2024-09-12 | End: 2024-09-12

## 2024-09-12 RX ORDER — IOPAMIDOL 755 MG/ML
75 INJECTION, SOLUTION INTRAVASCULAR
Status: COMPLETED | OUTPATIENT
Start: 2024-09-12 | End: 2024-09-12

## 2024-09-12 RX ORDER — KETOROLAC TROMETHAMINE 15 MG/ML
15 INJECTION, SOLUTION INTRAMUSCULAR; INTRAVENOUS ONCE
Status: COMPLETED | OUTPATIENT
Start: 2024-09-12 | End: 2024-09-12

## 2024-09-12 RX ORDER — CIPROFLOXACIN 500 MG/1
250 TABLET, FILM COATED ORAL 2 TIMES DAILY
Qty: 7 TABLET | Refills: 0 | Status: SHIPPED | OUTPATIENT
Start: 2024-09-12 | End: 2024-09-19

## 2024-09-12 RX ORDER — ONDANSETRON 2 MG/ML
4 INJECTION INTRAMUSCULAR; INTRAVENOUS ONCE
Status: COMPLETED | OUTPATIENT
Start: 2024-09-12 | End: 2024-09-12

## 2024-09-12 RX ORDER — CLONAZEPAM 1 MG/1
0.5 TABLET ORAL ONCE
Status: COMPLETED | OUTPATIENT
Start: 2024-09-12 | End: 2024-09-12

## 2024-09-12 RX ORDER — FENTANYL CITRATE 50 UG/ML
25 INJECTION, SOLUTION INTRAMUSCULAR; INTRAVENOUS ONCE
Status: COMPLETED | OUTPATIENT
Start: 2024-09-12 | End: 2024-09-12

## 2024-09-12 RX ADMIN — FENTANYL CITRATE 25 MCG: 50 INJECTION, SOLUTION INTRAMUSCULAR; INTRAVENOUS at 06:37

## 2024-09-12 RX ADMIN — IOPAMIDOL 75 ML: 755 INJECTION, SOLUTION INTRAVENOUS at 07:52

## 2024-09-12 RX ADMIN — KETOROLAC TROMETHAMINE 15 MG: 15 INJECTION, SOLUTION INTRAMUSCULAR; INTRAVENOUS at 10:17

## 2024-09-12 RX ADMIN — METOCLOPRAMIDE 10 MG: 5 INJECTION, SOLUTION INTRAMUSCULAR; INTRAVENOUS at 08:31

## 2024-09-12 RX ADMIN — ONDANSETRON 4 MG: 2 INJECTION INTRAMUSCULAR; INTRAVENOUS at 06:37

## 2024-09-12 RX ADMIN — CLONAZEPAM 0.5 MG: 1 TABLET ORAL at 09:15

## 2024-09-12 RX ADMIN — CEFTRIAXONE SODIUM 1000 MG: 1 INJECTION, POWDER, FOR SOLUTION INTRAMUSCULAR; INTRAVENOUS at 10:06

## 2024-09-12 RX ADMIN — PANTOPRAZOLE SODIUM 40 MG: 40 INJECTION, POWDER, FOR SOLUTION INTRAVENOUS at 08:38

## 2024-09-12 ASSESSMENT — ENCOUNTER SYMPTOMS
VOMITING: 0
ABDOMINAL PAIN: 1
COUGH: 0
SHORTNESS OF BREATH: 0
NAUSEA: 1

## 2024-09-12 ASSESSMENT — PAIN SCALES - GENERAL
PAINLEVEL_OUTOF10: 10
PAINLEVEL_OUTOF10: 9

## 2024-09-13 LAB
EKG ATRIAL RATE: 101 BPM
EKG P AXIS: 49 DEGREES
EKG P-R INTERVAL: 168 MS
EKG Q-T INTERVAL: 346 MS
EKG QRS DURATION: 72 MS
EKG QTC CALCULATION (BAZETT): 448 MS
EKG R AXIS: 75 DEGREES
EKG T AXIS: 65 DEGREES
EKG VENTRICULAR RATE: 101 BPM

## 2024-09-15 LAB
MICROORGANISM SPEC CULT: ABNORMAL
SPECIMEN DESCRIPTION: ABNORMAL

## 2024-11-02 NOTE — CONSULTS
CONSULTATION      REASON FOR CONSULT: For Botox injections to pylorus    REQUESTING PHYSICIAN:  Greta Augustine MD    HISTORY OF PRESENT ILLNESS:    The patient is a 76 y.o. female with history of severe GERD despite medical therapy, large hiatal hernia s/p robotic hiatal hernia repair with Mark fundoplication (2/26/2234), postoperative acute respiratory failure s/p trach/PEG (3/27/2020), s/p trach exchange (4/2/2020) who presents on 5/12 with diffuse abdominal pain and multiple episodes of nonbloody emesis and abdominal pain. Denies any fever, chills, diaphoresis, diarrhea, constipation. General surgery was consulted for concern for small bowel obstruction. MBSS-swallowing mechanism grossly within normal limits without evidence of aspiration. Esophagogram- dilated esophagus without evidence of esophageal outlet obstruction. Somewhat narrow caliber at the GE junction. Contrast upper GI series through PEG tube (PEGogram)- suspicion of at least partial or intermittent gastric outlet obstruction/paralysis of the pylorus. GI was consulted by general surgery for Botox injections to pylorus.     Medical History:   Past Medical History:   Diagnosis Date    Anxiety     Arthritis     Back pain     Bronchitis     Caffeine use     8 coffee / day    Carpal tunnel syndrome     Cataracts, bilateral     Constipation     Depression     Diarrhea     GERD (gastroesophageal reflux disease)     H/O gastric ulcer     Hyperlipidemia     Hypertension     Mumps     MVP (mitral valve prolapse)     Dr. Frieda Kraus in May 2019    Recurrent UTI     Dr. Matias Desmond    Sinusitis     Tracheostomy in place [facility])     Ulcerative esophagitis     Wellness examination     Dr. Tomeka Berger seen in Jan 2020       SURGICAL HISTORY:  Past Surgical History:   Procedure Laterality Date    APPENDECTOMY      CARPAL TUNNEL RELEASE      COLONOSCOPY      CYSTOSCOPY      EGD  3/17/2020         EYE SURGERY      patricia IOL    GASTRIC FUNDOPLICATION N/A 8/37/7120    XI LAPAROSCOPIC ROBOTIC HIATAL HERNIA REPAIR, CHERYL FUNDOPLICATION performed by Alek Sykes MD at [facility] N/A 3/27/2020    EGD PEG TUBE PLACEMENT performed by Alek Sykes MD at [address] CATH POWER PICC TRIPLE  3/4/2020         HIATAL HERNIA REPAIR  02/24/2020    LAPAROSCOPIC ROBOTIC HIATAL HERNIA REPAIR, CHERYL FUNDOPLICATION     HYSTERECTOMY      Abdominal    JOINT REPLACEMENT Right     right hip    OVARY REMOVAL      RHINOPLASTY      TONSILLECTOMY      TOTAL HIP ARTHROPLASTY      TOTAL NEPHRECTOMY      atrophic from infections, age 13   [de-identified] TRACHEOSTOMY N/A 3/27/2020    **ADD ON **WANTS 10:00AM **TRACHEOTOMY performed by Alek Sykes MD at [address]  N/A 4/2/2020    TRACHEOTOMY EXCHANGE performed by Alek Sykes MD at [address] ENDOSCOPY N/A 3/17/2020    BEDSIDE EGD ESOPHAGOGASTRODUODENOSCOPY (ICU) performed by Alek Sykes MD at [facility]       MEDS:  Prior to Admission medications    Medication Sig Start Date End Date Taking? Authorizing Provider   busPIRone (BUSPAR) 10 MG tablet Take 2 tablets by mouth 3 times daily 4/6/20   Maryjane Wang MD   escitalopram (LEXAPRO) 20 MG tablet Take 1 tablet by mouth daily 4/6/20   Maryjane Wang MD   QUEtiapine (SEROQUEL) 100 MG tablet Take 1 tablet by mouth nightly 4/6/20   Maryjane Wang MD   miconazole (MICOTIN) 2 % powder Apply topically 2 times daily.  4/6/20   Maryjane Wang MD   furosemide (LASIX) 20 MG tablet Take 1 tablet by mouth daily 4/6/20   Maryjane Wang MD   docusate (COLACE) 50 MG/5ML liquid 10 mLs by Per G Tube route 2 times daily 4/6/20   Maryjane Wang MD   mineral oil liquid 45 mLs by Per G Tube route daily 4/6/20   Maryjane Wang MD   metoclopramide (REGLAN) 5 MG/ML injection Infuse 2 mLs intravenously every 6 hours escitalopram  20 mg Oral Daily    gabapentin  300 mg Oral TID     Continuous Infusions:   PN-Adult 2-in-1 Central Line (Standard) 50 mL/hr at 05/15/20 1744    dextrose 5 % and 0.45 % NaCl 20 mL/hr at 05/15/20 1626     PRN Meds:LORazepam, morphine, acetaminophen, sodium chloride flush, potassium chloride **OR** potassium alternative oral replacement **OR** potassium chloride, magnesium sulfate, nicotine    Allergies   Allergen Reactions    Prednisone Anxiety    Compazine [Prochlorperazine Maleate]     Penicillin V Potassium     Penicillins Other (See Comments)     shock       SOCIAL HISTORY:   Social History     Socioeconomic History    Marital status:       Spouse name: Not on file    Number of children: 2    Years of education: Not on file    Highest education level: Not on file   Occupational History    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   Social Needs    Financial resource strain: Not on file    Food insecurity     Worry: Not on file     Inability: Not on file   [employer] needs     Medical: Not on file     Non-medical: Not on file   Tobacco Use    Smoking status: Current Every Day Smoker     Packs/day: 1.00     Years: 50.00     Pack years: 50.00     Types: Cigarettes    Smokeless tobacco: Never Used   Substance and Sexual Activity    Alcohol use: No    Drug use: No    Sexual activity: Never   Lifestyle    Physical activity     Days per week: Not on file     Minutes per session: Not on file    Stress: Not on file   Relationships    Social connections     Talks on phone: Not on file     Gets together: Not on file     Attends Judaism service: Not on file     Active member of club or organization: Not on file     Attends meetings of clubs or organizations: Not on file     Relationship status: Not on file    Intimate partner violence     Fear of current or ex partner: Not on file     Emotionally abused: Not on file     Physically abused: Not on file     Forced sexual activity: Not on file Other Topics Concern    Not on file   Social History Narrative    Not on file       FAMILY HISTORY:   Family History   Problem Relation Age of Onset    Cancer Mother         uterine    Heart Attack Mother     Heart Attack Father     Other Maternal Grandfather         foot gangrene    Other Paternal Grandmother         bleeding ulcers    Other Paternal Grandfather         lung cancer       REVIEW OF SYSTEMS:  10 out of 14 systems were reviewed and otherwise negative per patient recall. PHYSICAL EXAM:    /61   Pulse 77   Temp 98.3 °F (36.8 °C) (Oral)   Resp 20   Ht 5' 3\" (1.6 m)   Wt 188 lb 7.9 oz (85.5 kg)   SpO2 96%   BMI 33.39 kg/m²     General:  Alert and oriented x 3. No acute distress. Nontoxic in appearance. Skin: Rash is not appreciated. Easy bruising is not appreciated. There are no spider angioma on the anterior chest wall. There are no tattoos. There is no jaundice  HEENT: Trach tube with trach collar in place. Sclera are anicteric and conjunctiva are normal.  Hearing is adequate. Oropharynx moist without thrush. Oral ulcers are not seen. Neck is supple without masses. There is no supraclavicular wasting  Heart: Regular rate and rhythm. There are no murmurs. PMI is nondisplaced. Lungs: Clear to auscultation, with no rales, wheezes, or rhonchi. Unlabored breathing pattern with adequate aeration. Abdomen: G-tube in place. Hooked to gravity. Bowel sounds are normal.  The abdomen is soft and non distended. There is no tenderness, guarding, rebound, or rigidity. There are no masses, hepatosplenomegaly, or hernias. Peritoneal signs are not appreciated. Extemities: There is no clubbing or edema. Pulses are palpable.        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 We will check hepatitis panel, serology work-up and ultrasound gallbladder to evaluate for abnormal LFTs. 2. Can be advanced to clear liquid diet if okay with general surgery. 3. We will plan for EGD on Monday. COVID negative. Lisbeth Elizondo MD  Internal Medicine Resident, PGY-1  [facility]; [place], [place]  5/16/2020, 7:28 AM  Attending Physician Statement  I have discussed the care of Enma Gama and   I have examined the patient myselft independently, and taken ros and hpi , including pertinent history and exam findings,  with the author of this note . I have reviewed the key elements of all parts of the encounter with the nurse practitioner/resident.     I agree with the assessment, plan and orders as documented by the above health care provider       Patient able to tolerate sips of water in her saliva with no problems, will suggest cautious clear liquid diet for now  EGD on Monday  Ultrasound and work-up for the elevated liver enzymes  PPI  Electronically signed by Marleny Davis MD DISPLAY PLAN FREE TEXT

## 2025-04-23 NOTE — PROGRESS NOTES
Will be addressed at appt on 4/24/2025   Limits     Objective   Bed mobility  Rolling to Left: Maximum assistance  Rolling to Right: Maximum assistance  Supine to Sit: Maximum assistance  Sit to Supine: Maximum assistance  Scooting: Maximal assistance  Transfers  Sit to Stand: Unable to assess  Stand to sit: Unable to assess  Bed to Chair: Unable to assess  Stand Pivot Transfers: Unable to assess  Ambulation  Ambulation?: No  More Ambulation?: No     Balance  Posture: Fair  Sitting - Static: Fair  Sitting - Dynamic: Poor  Comments: pt dangled at EOB ~3 minutes with min to mod A+1; requested to return to supine d/t \"exhausted\"   Exercises  Heelslides: BLE AAROM x10   Knee Short Arc Quad: BLE AAROM x 10  Ankle Pumps: Supine: AAROM  x10  Upper Extremity: BUE AAROM x 10 including bicep curls, punches and shoulder flexion   Comments: .supine x 10 AAROM, B gastroc stretches 20\" x3, poor tolerance     Goals  Short term goals  Time Frame for Short term goals: 10 visits  Short term goal 1: transfers with min assist  Short term goal 2: amb 25 ft with a RW x min assist  Short term goal 3: bed mobility activity with min assist   Short term goal 4: exercise program x SBGA  Patient Goals   Patient goals : Return home    Plan    Plan  Times per week: 3-5 visits weekly  Times per day: Daily  Current Treatment Recommendations: Strengthening, Balance Training, Endurance Training, Functional Mobility Training, Safety Education & Training, ROM, Transfer Training, Wheelchair Mobility Training, Pain Management, Home Exercise Program, Patient/Caregiver Education & Training, Equipment Evaluation, Education, & procurement, Positioning  Safety Devices  Type of devices: Bed alarm in place, Call light within reach, Patient at risk for falls, Left in bed, Nurse notified  Restraints  Initially in place: No     Therapy Time   Individual Concurrent Group Co-treatment   Time In [address]         Time Out 1527         Minutes 40                 [place], [place]

## (undated) DEVICE — BAG SPEC REM 224ML W4XL6IN DIA10MM 1 HND GYN DISP ENDOPCH

## (undated) DEVICE — GARMENT,MEDLINE,DVT,INT,CALF,MED, GEN2: Brand: MEDLINE

## (undated) DEVICE — COVER,LIGHT HANDLE,FLX,2/PK: Brand: MEDLINE INDUSTRIES, INC.

## (undated) DEVICE — Device: Brand: DEFENDO VALVE AND CONNECTOR KIT

## (undated) DEVICE — FORCEPS BX L240CM WRK CHN 2.8MM STD CAP W/ NDL MIC MESH

## (undated) DEVICE — SINGLE-USE BIOPSY FORCEPS: Brand: RADIAL JAW 4

## (undated) DEVICE — GUIDEWIRE ENDOSCP L260CM DIA0.035IN BILI STR RND TIP HI

## (undated) DEVICE — ARM DRAPE

## (undated) DEVICE — MIC* SAFETY PERCUTANEOUS ENDOSCOPIC GASTROSTOMY PEG KIT - 20 FR - PULL: Brand: MIC PEG TUBE

## (undated) DEVICE — TOWEL,OR,DSP,ST,NATURAL,DLX,4/PK,20PK/CS: Brand: MEDLINE

## (undated) DEVICE — BITEBLOCK 54FR W/ DENT RIM BLOX

## (undated) DEVICE — GOWN,AURORA,NONREINFORCED,LARGE: Brand: MEDLINE

## (undated) DEVICE — SUTURE VCRL SZ 3-0 L27IN ABSRB UD L26MM SH 1/2 CIR J416H

## (undated) DEVICE — GLOVE ORANGE PI 8   MSG9080

## (undated) DEVICE — SPONGE,PEANUT,XRAY,ST,SM,3/8",5/CARD: Brand: MEDLINE INDUSTRIES, INC.

## (undated) DEVICE — SUTURE SZ 0 27IN 5/8 CIR UR-6  TAPER PT VIOLET ABSRB VICRYL J603H

## (undated) DEVICE — Device

## (undated) DEVICE — SUTURE MCRYL SZ 4-0 L18IN ABSRB UD L16MM PC-3 3/8 CIR PRIM Y845G

## (undated) DEVICE — DRAPE THYROID

## (undated) DEVICE — SOLUTION PREP POVIDONE IOD FOR SKIN MUCOUS MEM PRIOR TO

## (undated) DEVICE — BINDER ABD UNISX 9IN 62IN L AND XL UNIV

## (undated) DEVICE — TIP COVER ACCESSORY

## (undated) DEVICE — ENDO KIT W/SYRINGE: Brand: MEDLINE INDUSTRIES, INC.

## (undated) DEVICE — GLOVE ORANGE PI 7 1/2   MSG9075

## (undated) DEVICE — INSUFFLATION TUBING SET WITH FILTER, FUNNEL CONNECTOR AND LUER LOCK: Brand: JOSNOE MEDICAL INC

## (undated) DEVICE — SUTURE VCRL SZ 0 L27IN ABSRB UD L26MM CT-2 1/2 CIR J270H

## (undated) DEVICE — SUTURE PERMAHAND SZ 2-0 L30IN NONABSORBABLE BLK L17MM RB-1 K873H

## (undated) DEVICE — SYRINGE MED 50ML LUERLOCK TIP

## (undated) DEVICE — PAD GRND 2 PLT AD W CRD 10FT

## (undated) DEVICE — ELECTRODE PT RET AD L9FT HI MOIST COND ADH HYDRGEL CORDED

## (undated) DEVICE — GLOVE SURG SZ 65 THK91MIL LTX FREE SYN POLYISOPRENE

## (undated) DEVICE — HOLDER TUBE TRACH LG COTTON STRP HK LOOP CLOSURE BRTRC FOAM

## (undated) DEVICE — ESOPHAGEAL/PYLORIC/COLONIC/BILIARY WIREGUIDED BALLOON DILATATION CATHETER: Brand: CRE™ PRO

## (undated) DEVICE — SOLUTION ANTIFOG VIS SYS CLEARIFY LAPSCP

## (undated) DEVICE — CO2 CANNULA,SUPERSOFT, ADLT,7'O2,7'CO2: Brand: MEDLINE

## (undated) DEVICE — JELLY,LUBE,STERILE,FLIP TOP,TUBE,2-OZ: Brand: MEDLINE

## (undated) DEVICE — MITT SURG PREP L ADH DISPOSABLE

## (undated) DEVICE — HYPODERMIC SAFETY NEEDLE: Brand: MAGELLAN

## (undated) DEVICE — SVMMC HD AND NK PK

## (undated) DEVICE — SNARE ENDOSCP L240CM LOOP W27MM SHTH DIA2.4MM WRK CHN 2.8MM

## (undated) DEVICE — CONNECTOR,TUBING,5-IN-1,NON-STERILE: Brand: MEDLINE INDUSTRIES, INC.

## (undated) DEVICE — SHIELD SOAK PRE-KLENZ 6ML

## (undated) DEVICE — RETRIEVAL BALLOON CATHETER: Brand: EXTRACTOR™ PRO RX

## (undated) DEVICE — CANNULA SEAL

## (undated) DEVICE — GLOVE ORANGE PI 7   MSG9070

## (undated) DEVICE — VESSEL SEALER EXTEND: Brand: ENDOWRIST

## (undated) DEVICE — BLADELESS OBTURATOR: Brand: WECK VISTA

## (undated) DEVICE — ESOPHAGEAL/PYLORIC/BILIARY WIREGUIDED BALLOON DILATATION CATHETER: Brand: CRE™ PRO

## (undated) DEVICE — PENROSE DRAIN 18 X .5" SILICONE: Brand: MEDLINE

## (undated) DEVICE — Z DISCONTINUED BY MEDLINE USE 2711682 TRAY SKIN PREP DRY W/ PREM GLV

## (undated) DEVICE — BANDAGE GZ W2XL75IN ST RAYON POLY CNFRM STRTCH LTWT

## (undated) DEVICE — PLUMEPORT LAPAROSCOPIC SMOKE FILTRATION DEVICE: Brand: PLUMEPORT ACTIV

## (undated) DEVICE — STRIP,CLOSURE,WOUND,MEDI-STRIP,1/2X4: Brand: MEDLINE

## (undated) DEVICE — FORCEPS BX L240CM JAW DIA2.8MM L CAP W/ NDL MIC MESH TOOTH

## (undated) DEVICE — YANKAUER,FLEXIBLE HANDLE,REGLR CAPACITY: Brand: MEDLINE INDUSTRIES, INC.

## (undated) DEVICE — 3M™ STERI-STRIP™ COMPOUND BENZOIN TINCTURE 40 BAGS/CARTON 4 CARTONS/CASE C1544: Brand: 3M™ STERI-STRIP™

## (undated) DEVICE — SOLUTION SCRB 4OZ 10% POVIDONE IOD ANTIMIC BTL

## (undated) DEVICE — ADAPTER TBNG LUER STUB 15 GA INTMED

## (undated) DEVICE — CHLORAPREP 26ML ORANGE

## (undated) DEVICE — MEDICINE CUP, GRADUATED, STER: Brand: MEDLINE

## (undated) DEVICE — BLADELESS OBTURATOR, LONG: Brand: WECK VISTA

## (undated) DEVICE — TRAP SURG QUAD PARABOLA SLOT DSGN SFTY SCRN TRAPEASE

## (undated) DEVICE — DEFENDO AIR WATER SUCTION AND BIOPSY VALVE KIT FOR  OLYMPUS: Brand: DEFENDO AIR/WATER/SUCTION AND BIOPSY VALVE

## (undated) DEVICE — ESOPHAGEAL BALLOON DILATATION CATHETER: Brand: CRE FIXED WIRE

## (undated) DEVICE — TUBING, SUCTION, 1/4" X 12', STRAIGHT: Brand: MEDLINE

## (undated) DEVICE — SYRINGE INFL 60ML DISP ALLIANCE II

## (undated) DEVICE — SUTURE MCRYL + SZ 4 0 L18IN ABSRB UD PC 3 L16MM 3 8 CIR PRIM MCP845G

## (undated) DEVICE — APPLICATOR MEDICATED 26 CC SOLUTION HI LT ORNG CHLORAPREP

## (undated) DEVICE — INJECTION THERAPY NEEDLE CATHETER: Brand: INTERJECT

## (undated) DEVICE — SUTURE BARB NON ABSORBABLE V LOC PBT BLU SZ 0 LEN 9 IN GS 21 VLOCN0346

## (undated) DEVICE — GLOVE SURG SZ 6 THK91MIL LTX FREE SYN POLYISOPRENE ANTI

## (undated) DEVICE — SPONGE DRN W4XL4IN RAYON/POLYESTER 6 PLY NONWOVEN PRECUT

## (undated) DEVICE — ADHESIVE SKIN CLSR 0.7ML TOP DERMBND ADV

## (undated) DEVICE — CUP MED 1OZ CLR POLYPR FEED GRAD W/O LID

## (undated) DEVICE — TROCAR: Brand: KII FIOS FIRST ENTRY

## (undated) DEVICE — ELECTRODE ELECSURG NDL 2.8 INX7.2 CM COAT INSUL EDGE

## (undated) DEVICE — GOWN,AURORA,NONRNF,XL,30/CS: Brand: MEDLINE

## (undated) DEVICE — SUTURE PROL SZ 2-0 L30IN NONABSORBABLE BLU L26MM CT-2 1/2 8411H

## (undated) DEVICE — SPHINCTEROTOME: Brand: DREAMTOME™ RX 44

## (undated) DEVICE — GUIDEWIRE ENDO L260CM DIA0.035IN BILI STD HI PERF STR RND

## (undated) DEVICE — Z DUP USE 2641840 CLIP INT L POLYMER LOK LIG HEM O LOK

## (undated) DEVICE — DISPOSABLE LAPAROSCOPIC CORDS, 1 PER POUCH: Brand: A&E MEDICAL / DISPOSABLE LAPAROSCOPIC CORDS

## (undated) DEVICE — SINGLE-USE POLYPECTOMY SNARE: Brand: CAPTIFLEX

## (undated) DEVICE — SYRINGE, LUER LOCK, 30ML: Brand: MEDLINE

## (undated) DEVICE — BASIN EMSIS 700ML GRAPHITE PLAS KID SHP GRAD

## (undated) DEVICE — AGENT HEMSTAT W2XL14IN OXIDIZED REGENERATED CELOS ABSRB FOR

## (undated) DEVICE — SUTURE V-LOC SZ 0 L18IN NONABSORBABLE BLU L37MM GS-21 1/2 VLOCN0326

## (undated) DEVICE — BINDER ABD XL W15IN 62 74IN CIRC UNISX 5 PNL E CNTCT CLSR

## (undated) DEVICE — SUTURE PERMAHAND SZ 3-0 L30IN NONABSORBABLE BLK SH L26MM K832H

## (undated) DEVICE — SUTURE PERMAHAND SZ 0 L30IN NONABSORBABLE BLK L26MM SH 1/2 K834H

## (undated) DEVICE — SUTURE ABSORBABLE BRAIDED 3-0 12X18 IN COAT UD VICRYL + VCP110G

## (undated) DEVICE — GAUZE,SPONGE,4"X4",16PLY,STRL,LF,10/TRAY: Brand: MEDLINE